# Patient Record
Sex: FEMALE | Race: WHITE | Employment: OTHER | URBAN - METROPOLITAN AREA
[De-identification: names, ages, dates, MRNs, and addresses within clinical notes are randomized per-mention and may not be internally consistent; named-entity substitution may affect disease eponyms.]

---

## 2017-04-25 ENCOUNTER — NURSING HOME VISIT (OUTPATIENT)
Dept: GERIATRICS | Facility: CLINIC | Age: 82
End: 2017-04-25
Payer: MEDICARE

## 2017-04-25 VITALS
RESPIRATION RATE: 16 BRPM | HEART RATE: 68 BPM | DIASTOLIC BLOOD PRESSURE: 94 MMHG | SYSTOLIC BLOOD PRESSURE: 170 MMHG | WEIGHT: 210 LBS | BODY MASS INDEX: 36.33 KG/M2 | OXYGEN SATURATION: 97 % | TEMPERATURE: 96.8 F

## 2017-04-25 DIAGNOSIS — J44.1 CHRONIC OBSTRUCTIVE PULMONARY DISEASE WITH ACUTE EXACERBATION (H): ICD-10-CM

## 2017-04-25 DIAGNOSIS — R41.3 MEMORY LOSS: ICD-10-CM

## 2017-04-25 DIAGNOSIS — R19.7 DIARRHEA, UNSPECIFIED TYPE: ICD-10-CM

## 2017-04-25 DIAGNOSIS — A41.9 SEPSIS, DUE TO UNSPECIFIED ORGANISM: Primary | ICD-10-CM

## 2017-04-25 DIAGNOSIS — I10 ESSENTIAL HYPERTENSION: ICD-10-CM

## 2017-04-25 PROCEDURE — 99306 1ST NF CARE HIGH MDM 50: CPT | Performed by: FAMILY MEDICINE

## 2017-04-25 PROCEDURE — 99207 ZZC CDG-CORRECTLY CODED, REVIEWED AND AGREE: CPT | Performed by: FAMILY MEDICINE

## 2017-04-25 RX ORDER — BUDESONIDE AND FORMOTEROL FUMARATE DIHYDRATE 80; 4.5 UG/1; UG/1
2 AEROSOL RESPIRATORY (INHALATION) 2 TIMES DAILY
COMMUNITY
End: 2017-08-22

## 2017-04-25 RX ORDER — TIOTROPIUM BROMIDE 18 UG/1
18 CAPSULE ORAL; RESPIRATORY (INHALATION) DAILY
COMMUNITY
End: 2018-05-30

## 2017-04-25 RX ORDER — NYSTATIN 100000 U/G
CREAM TOPICAL DAILY PRN
COMMUNITY
End: 2017-06-20

## 2017-04-25 RX ORDER — GUAIFENESIN 600 MG/1
600 TABLET, EXTENDED RELEASE ORAL 2 TIMES DAILY
COMMUNITY
End: 2017-06-20

## 2017-04-25 RX ORDER — ALBUTEROL SULFATE 0.83 MG/ML
1 SOLUTION RESPIRATORY (INHALATION) EVERY 6 HOURS PRN
COMMUNITY
End: 2017-05-02

## 2017-04-25 NOTE — MR AVS SNAPSHOT
"              After Visit Summary   2017    Jazmin Clifton    MRN: [id number]           Patient Information     Date Of Birth          1932        Visit Information        Provider Department      2017 10:30 AM Jessica Alvarez MD [facility]        Today's Diagnoses     Sepsis, due to unspecified organism (H)    -  1    Chronic obstructive pulmonary disease with acute exacerbation (H)        Essential hypertension        Diarrhea, unspecified type        Memory loss           Follow-ups after your visit        Who to contact     If you have questions or need follow up information about today's clinic visit or your schedule please contact [facility] directly at [phone].  Normal or non-critical lab and imaging results will be communicated to you by Antenovahart, letter or phone within 4 business days after the clinic has received the results. If you do not hear from us within 7 days, please contact the clinic through Antenovahart or phone. If you have a critical or abnormal lab result, we will notify you by phone as soon as possible.  Submit refill requests through GleeMaster or call your pharmacy and they will forward the refill request to us. Please allow 3 business days for your refill to be completed.          Additional Information About Your Visit        MyChart Information     GleeMaster lets you send messages to your doctor, view your test results, renew your prescriptions, schedule appointments and more. To sign up, go to [email or web address] . Click on \"Log in\" on the left side of the screen, which will take you to the Welcome page. Then click on \"Sign up Now\" on the right side of the page.     You will be asked to enter the access code listed below, as well as some personal information. Please follow the directions to create your username and password.     Your access code is: [email or web address]  Expires: 2017  1:35 PM     Your access code will  in 90 " days. If you need help or a new code, please call your [place] clinic or [phone].        Care EveryWhere ID     This is your Care EveryWhere ID. This could be used by other organizations to access your [place] medical records  [id number]        Your Vitals Were     Pulse Temperature Respirations Pulse Oximetry BMI (Body Mass Index)       68 96.8  F (36  C) 16 97% 36.33 kg/m2        Blood Pressure from Last 3 Encounters:   04/25/17 (!) 170/94   02/12/16 145/71   01/29/16 170/75    Weight from Last 3 Encounters:   04/25/17 210 lb (95.3 kg)   01/29/16 188 lb (85.3 kg)   05/14/12 198 lb 14.4 oz (90.2 kg)              Today, you had the following     No orders found for display         Today's Medication Changes          These changes are accurate as of: 4/25/17 11:59 PM.  If you have any questions, ask your nurse or doctor.               These medicines have changed or have updated prescriptions.        Dose/Directions    NAMENDA PO   This may have changed:  Another medication with the same name was removed. Continue taking this medication, and follow the directions you see here.        Dose:  5 mg   Take 5 mg by mouth daily   Refills:  0       TYLENOL PO   This may have changed:  Another medication with the same name was removed. Continue taking this medication, and follow the directions you see here.        Dose:  500 mg   Take 500 mg by mouth every 6 hours as needed for mild pain or fever   Refills:  0         Stop taking these medicines if you haven't already. Please contact your care team if you have questions.     ferrous sulfate 325 (65 FE) MG tablet   Commonly known as:  IRON           METHOCARBAMOL PO           OXYCODONE HCL PO           senna-docusate 8.6-50 MG per tablet   Commonly known as:  SENOKOT-S;PERICOLACE                    Primary Care Provider Office Phone # Fax #    Ginny Helton [phone]       [address] [address]         Thank you!     Thank you for choosing [facility]  for your care. Our goal is always to provide you with excellent care. Hearing back from our patients is one way we can continue to improve our services. Please take a few minutes to complete the written survey that you may receive in the mail after your visit with us. Thank you!             Your Updated Medication List - Protect others around you: Learn how to safely use, store and throw away your medicines at www.disposemymeds.org.          This list is accurate as of: 4/25/17 11:59 PM.  Always use your most recent med list.                   Brand Name Dispense Instructions for use    albuterol (2.5 MG/3ML) 0.083% neb solution      Take 1 vial by nebulization every 6 hours as needed for shortness of breath / dyspnea or wheezing       ASPIRIN PO      Take 81 mg by mouth daily       bisacodyl 10 MG Suppository    DULCOLAX     Place 10 mg rectally daily as needed for constipation       budesonide-formoterol 80-4.5 MCG/ACT Inhaler    SYMBICORT     Inhale 2 puffs into the lungs 2 times daily       calcium carb 1250 mg (500 mg [place])/vitamin D 200 units 500-200 MG-UNIT per tablet    OSCAL with D     Take 1 tablet by mouth daily       CELEXA PO      Take 10 mg by mouth daily       cyanocobalamin 1000 MCG tablet    vitamin  B-12     Take 1,000 mcg by mouth daily       DITROPAN XL PO      Take 5 mg by mouth 2 times daily       * guaiFENesin 100 MG/5ML Syrp    ROBITUSSIN     Take 10 mLs by mouth every 6 hours as needed for cough       * guaiFENesin 600 MG 12 hr tablet    MUCINEX     Take 600 mg by mouth 2 times daily       HYDROXYZINE HCL PO      Take 10 mg by mouth every 6 hours as needed for itching       IMDUR PO      Take 30 mg by mouth daily       isradipine 5 MG Tb24    DYNACIRC     Take 5 mg by mouth daily       LASIX PO      Take 20 mg by mouth daily       LISINOPRIL PO      Take 20 mg by mouth daily       magnesium citrate solution      Take 296  mLs by mouth once       magnesium hydroxide 400 MG/5ML suspension    MILK OF MAGNESIA     Take 30 mLs by mouth daily as needed for constipation or heartburn       MAGNESIUM OXIDE PO      Take 250 mg by mouth daily       NAMENDA PO      Take 5 mg by mouth daily       NEURONTIN PO      Take 300 mg by mouth daily       nystatin cream    MYCOSTATIN     Apply topically daily as needed for dry skin       * PREDNISONE PO      Take 40 mg by mouth daily       * PREDNISONE PO      Take 30 mg by mouth daily       * PREDNISONE PO   Start taking on:  4/29/2017      Take 20 mg by mouth daily       * PREDNISONE PO   Start taking on:  5/1/2017      Take 10 mg by mouth daily       * PREDNISONE PO   Start taking on:  5/3/2017      Take 5 mg by mouth daily       PRILOSEC PO      Take 20 mg by mouth every morning       tiotropium 18 MCG capsule    SPIRIVA     Inhale 18 mcg into the lungs daily       TYLENOL PO      Take 500 mg by mouth every 6 hours as needed for mild pain or fever       VITAMIN D (CHOLECALCIFEROL) PO      Take 5,000 Units by mouth daily       ZOCOR PO      Take 10 mg by mouth At Bedtime       ZOFRAN PO      Take 8 mg by mouth every 8 hours as needed for nausea or vomiting       * Notice:  This list has 7 medication(s) that are the same as other medications prescribed for you. Read the directions carefully, and ask your doctor or other care provider to review them with you.

## 2017-04-25 NOTE — PROGRESS NOTES
[facility]  PRIMARY CARE PROVIDER AND CLINIC:  Ginny Helton [facility] [address] / [place] [address]  Chief Complaint   Patient presents with     Hospital F/U     Nursing Home Acute       HPI:    Jazmin Clifton is a 84 year old  (12/30/1932),admitted to the [facility]  from Hospital  [facility].  Hospital stay 4/14/17 through 4/18/17.  Admitted to this facility for  rehab, medical management and nursing care. Current issues are:         Sepsis (H)  COPD (chronic obstructive pulmonary disease) (H)  Essential hypertension  Diarrhea     Blood Pressures-  4/21  138/79  4/23  167/72    This 84 y.o. female was admitted to [facility] of shortness of breath, cough and chest pain. CT was done in emergency room which showed no evidence of PE some segmental atelectasis with consolidation but otherwise no acute infiltrate. Patient was started on IV steroids, azithromycin and ceftriaxone, bronchodilators with good results. Patient has gradually improved and is not much short of breath compared to admission. She was also started on Spiriva during hospital stay which she's been tolerating well. Patient has been ablating in hallways without significant shortness of breath. She still does have a similar crackles but denies bringing up any significant sputum. Patient still complains of dry hacking cough. Overall she thinks she has improved close to her baseline. She was seen by PT and OT and is recommended for acute rehabilitation. Patient was also evaluated by speech pathologist while in hospital. She has completed 5 days of antibiotic therapy. She'll be discharged on prednisone taper over 2-3 weeks. We'll discharge her on Spiriva and Symbicort along with guaifenesin.          CODE STATUS/ADVANCE DIRECTIVES DISCUSSION:   CPR/Full code   Patient's living condition: lives with family, Son     ALLERGIES:Accupril; Ace inhibitors; Augmentin; Levofloxacin; Morphine; and Norvasc [amlodipine  besylate]  PAST MEDICAL HISTORY:  has a past medical history of ABDOMINAL PAIN GENERALIZED (3/15/2006); Abdominal pain, generalized (3/15/2006); Atherosclerosis of renal artery (H); BENIGN HYPERTENSION (5/1/2006); Benign neoplasm of scalp and skin of neck; Cerebral aneurysm, nonruptured; Depressive disorder, not elsewhere classified; Esophageal reflux; Female stress incontinence; 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  PAST SURGICAL HISTORY:  has a past surgical history that includes surgical history of -; surgical history of -; surgical history of - (1996); surgical history of -; surgical history of -; cholecystectomy, laporoscopic (1997); and hysterectomy, mirtha (1982).  FAMILY HISTORY: family history includes Asthma in her son; CANCER in her brother and mother; CEREBROVASCULAR DISEASE in her father; DIABETES in her son; Eye Disorder in her son; GASTROINTESTINAL DISEASE in her son; HEART DISEASE in her father. There is no history of C.A.D., Hypertension, Breast Cancer, Cancer - colorectal, or Prostate Cancer.  SOCIAL HISTORY:  reports that she has quit smoking. She does not have any smokeless tobacco history on file. She reports that she drinks alcohol. She reports that she does not use illicit drugs.    Post Discharge Medication Reconciliation Status: discharge medications reconciled and changed, per note/orders (see AVS).  Current Outpatient Prescriptions   Medication Sig Dispense Refill     albuterol (2.5 MG/3ML) 0.083% neb solution Take 1 vial by nebulization every 6 hours as needed for shortness of breath / dyspnea or wheezing       ASPIRIN PO Take 81 mg by mouth daily       PREDNISONE PO Take 40 mg by mouth daily       [START ON  4/27/2017] PREDNISONE PO Take 30 mg by mouth daily       [START ON 4/29/2017] PREDNISONE PO Take 20 mg by mouth daily       [START ON 5/1/2017] PREDNISONE PO Take 10 mg by mouth daily       [START ON 5/3/2017] PREDNISONE PO Take 5 mg by mouth daily       guaiFENesin (ROBITUSSIN) 100 MG/5ML SYRP Take 10 mLs by mouth every 6 hours as needed for cough       HYDROXYZINE HCL PO Take 10 mg by mouth every 6 hours as needed for itching       MAGNESIUM OXIDE PO Take 250 mg by mouth daily       guaiFENesin (MUCINEX) 600 MG 12 hr tablet Take 600 mg by mouth 2 times daily       Memantine HCl (NAMENDA PO) Take 5 mg by mouth daily       calcium carb 1250 mg, 500 mg [place],/vitamin D 200 units (OSCAL WITH D) 500-200 MG-UNIT per tablet Take 1 tablet by mouth daily       nystatin (MYCOSTATIN) cream Apply topically daily as needed for dry skin       tiotropium (SPIRIVA) 18 MCG capsule Inhale 18 mcg into the lungs daily       budesonide-formoterol (SYMBICORT) 80-4.5 MCG/ACT Inhaler Inhale 2 puffs into the lungs 2 times daily       Acetaminophen (TYLENOL PO) Take 500 mg by mouth every 6 hours as needed for mild pain or fever       Furosemide (LASIX PO) Take 20 mg by mouth daily       Citalopram Hydrobromide (CELEXA PO) Take 10 mg by mouth daily       VITAMIN D, CHOLECALCIFEROL, PO Take 5,000 Units by mouth daily       Oxybutynin Chloride (DITROPAN XL PO) Take 5 mg by mouth 2 times daily       bisacodyl (DULCOLAX) 10 MG suppository Place 10 mg rectally daily as needed for constipation       isradipine (DYNACIRC) 5 MG TB24 Take 5 mg by mouth daily       Isosorbide Mononitrate (IMDUR PO) Take 30 mg by mouth daily       LISINOPRIL PO Take 10 mg by mouth daily       magnesium citrate solution Take 296 mLs by mouth once       magnesium hydroxide (MILK OF MAGNESIA) 400 MG/5ML suspension Take 30 mLs by mouth daily as needed for constipation or heartburn       Gabapentin (NEURONTIN PO) Take 300 mg by mouth daily       Omeprazole (PRILOSEC PO)  Take 20 mg by mouth every morning       cyanocobalamin (VITAMIN  B-12) 1000 MCG tablet Take 1,000 mcg by mouth daily       Simvastatin (ZOCOR PO) Take 10 mg by mouth At Bedtime         ROS: c/o of nausea and diarrhea x 12 hrs. No fever. Blood pressure higher the last 2 days.   10 point ROS of systems including Constitutional, Eyes, Respiratory, Cardiovascular, Gastroenterology, Genitourinary, Integumentary, Muscularskeletal, Psychiatric were all negative except for pertinent positives noted in my HPI.    Exam:  BP (!) 170/94  Pulse 68  Temp 96.8  F (36  C)  Resp 16  Wt 210 lb (95.3 kg)  SpO2 97%  BMI 36.33 kg/m2  Gen: sitting in chair, alert, cooperative and in no respiratory  distress  HEENT: normocephalic; oropharynx clear  Card: RRR, S1, S2, no murmurs  Resp: lungs clear to auscultation bilaterally, wheezes   GI: abdomen soft, not-tender  MSK: normal muscle tone, no LE edema  Neuro: CX II-XII grossly in tact; ROM in all four extremities grossly in tact  Psych: alert and oriented x3; normal affect  Skin: warm, no suspicious rashes or lesions noted      Lab/Diagnostic data: all labs reviewed, none recent     ASSESSMENT/PLAN:  1. Sepsis (H)    2. COPD (chronic obstructive pulmonary disease) (H)    3. Essential hypertension    4. Diarrhea    5. Memory loss          Orders:  1. Zofran 8 mg po q 8 hr x 24 hr then q 6 hr prn Dx Nausea  2. C diff and stool cx Dx diarrhea  3. Increase lisinopril to 20 mg po qd (give total of 20 mg today) Dx HTN  4. Cont inue current meds for COPD exacerbation   Information reviewed:  Medications, vital signs, orders, nursing notes, problem list, hospital information. Total time spent with patient visit was 45 min including patient visit and review of past records. Greater than 50% of total time spent with counseling and coordinating care.    Electronically signed by:  Jessica Alvarez MD, MD

## 2017-05-02 ENCOUNTER — NURSING HOME VISIT (OUTPATIENT)
Dept: GERIATRICS | Facility: CLINIC | Age: 82
End: 2017-05-02
Payer: MEDICARE

## 2017-05-02 VITALS
HEART RATE: 71 BPM | BODY MASS INDEX: 34.6 KG/M2 | WEIGHT: 200 LBS | TEMPERATURE: 97.7 F | DIASTOLIC BLOOD PRESSURE: 73 MMHG | OXYGEN SATURATION: 97 % | RESPIRATION RATE: 20 BRPM | SYSTOLIC BLOOD PRESSURE: 144 MMHG

## 2017-05-02 DIAGNOSIS — I10 ESSENTIAL HYPERTENSION: ICD-10-CM

## 2017-05-02 DIAGNOSIS — A09: ICD-10-CM

## 2017-05-02 DIAGNOSIS — F02.80 ALZHEIMER'S DEMENTIA WITHOUT BEHAVIORAL DISTURBANCE, UNSPECIFIED TIMING OF DEMENTIA ONSET: ICD-10-CM

## 2017-05-02 DIAGNOSIS — G30.9 ALZHEIMER'S DEMENTIA WITHOUT BEHAVIORAL DISTURBANCE, UNSPECIFIED TIMING OF DEMENTIA ONSET: ICD-10-CM

## 2017-05-02 DIAGNOSIS — J44.1 CHRONIC OBSTRUCTIVE PULMONARY DISEASE WITH ACUTE EXACERBATION (H): Primary | ICD-10-CM

## 2017-05-02 DIAGNOSIS — A41.9 SEPSIS, DUE TO UNSPECIFIED ORGANISM: ICD-10-CM

## 2017-05-02 PROCEDURE — 99316 NF DSCHRG MGMT 30 MIN+: CPT | Performed by: FAMILY MEDICINE

## 2017-05-02 PROCEDURE — 99207 ZZC CDG-CORRECTLY CODED, REVIEWED AND AGREE: CPT | Performed by: FAMILY MEDICINE

## 2017-05-02 NOTE — MR AVS SNAPSHOT
"              After Visit Summary   5/2/2017    Jazmin Clifton    MRN: [id number]           Patient Information     Date Of Birth          12/30/1932        Visit Information        Provider Department      5/2/2017 10:00 AM Jessica Alvarez MD [facility]        Today's Diagnoses     Chronic obstructive pulmonary disease with acute exacerbation (H)    -  1    Sepsis, due to unspecified organism (H)        Essential hypertension        Alzheimer's dementia without behavioral disturbance, unspecified timing of dementia onset        Gastric flu           Follow-ups after your visit        Who to contact     If you have questions or need follow up information about today's clinic visit or your schedule please contact [facility] directly at [phone].  Normal or non-critical lab and imaging results will be communicated to you by SOLOMO Technologyhart, letter or phone within 4 business days after the clinic has received the results. If you do not hear from us within 7 days, please contact the clinic through SOLOMO Technologyhart or phone. If you have a critical or abnormal lab result, we will notify you by phone as soon as possible.  Submit refill requests through [employer] or call your pharmacy and they will forward the refill request to us. Please allow 3 business days for your refill to be completed.          Additional Information About Your Visit        MyChart Information     [employer] lets you send messages to your doctor, view your test results, renew your prescriptions, schedule appointments and more. To sign up, go to [email or web address] . Click on \"Log in\" on the left side of the screen, which will take you to the Welcome page. Then click on \"Sign up Now\" on the right side of the page.     You will be asked to enter the access code listed below, as well as some personal information. Please follow the directions to create your username and password.     Your access code is: [email or web address]  Expires: " 2017  1:35 PM     Your access code will  in 90 days. If you need help or a new code, please call your [place] clinic or [phone].        Care EveryWhere ID     This is your Care EveryWhere ID. This could be used by other organizations to access your [place] medical records  [id number]        Your Vitals Were     Pulse Temperature Respirations Pulse Oximetry BMI (Body Mass Index)       71 97.7  F (36.5  C) 20 97% 34.6 kg/m2        Blood Pressure from Last 3 Encounters:   17 144/73   17 (!) 170/94   16 145/71    Weight from Last 3 Encounters:   17 200 lb (90.7 kg)   17 210 lb (95.3 kg)   16 188 lb (85.3 kg)              Today, you had the following     No orders found for display         Today's Medication Changes          These changes are accurate as of: 17 11:59 PM.  If you have any questions, ask your nurse or doctor.               Stop taking these medicines if you haven't already. Please contact your care team if you have questions.     albuterol (2.5 MG/3ML) 0.083% neb solution           NAMENDA PO           JEANNIE PO                    Primary Care Provider Office Phone # Fax #    Ginny Helton [phone]       [address] [address]        Thank you!     Thank you for choosing [facility]  for your care. Our goal is always to provide you with excellent care. Hearing back from our patients is one way we can continue to improve our services. Please take a few minutes to complete the written survey that you may receive in the mail after your visit with us. Thank you!             Your Updated Medication List - Protect others around you: Learn how to safely use, store and throw away your medicines at www.disposemymeds.org.          This list is accurate as of: 17 11:59 PM.  Always use your most recent med list.                   Brand Name Dispense Instructions for use     ASPIRIN PO      Take 81 mg by mouth daily       bisacodyl 10 MG Suppository    DULCOLAX     Place 10 mg rectally daily as needed for constipation       budesonide-formoterol 80-4.5 MCG/ACT Inhaler    SYMBICORT     Inhale 2 puffs into the lungs 2 times daily       calcium carb 1250 mg (500 mg [place])/vitamin D 200 units 500-200 MG-UNIT per tablet    OSCAL with D     Take 1 tablet by mouth daily       CELEXA PO      Take 10 mg by mouth daily       cyanocobalamin 1000 MCG tablet    vitamin  B-12     Take 1,000 mcg by mouth daily       DITROPAN XL PO      Take 5 mg by mouth 2 times daily       * guaiFENesin 100 MG/5ML Syrp    ROBITUSSIN     Take 10 mLs by mouth every 6 hours as needed for cough       * guaiFENesin 600 MG 12 hr tablet    MUCINEX     Take 600 mg by mouth 2 times daily       HYDROXYZINE HCL PO      Take 10 mg by mouth every 6 hours as needed for itching       IMDUR PO      Take 30 mg by mouth daily       isradipine 5 MG Tb24    DYNACIRC     Take 5 mg by mouth daily       LASIX PO      Take 20 mg by mouth daily       LISINOPRIL PO      Take 20 mg by mouth daily       magnesium citrate solution      Take 296 mLs by mouth once       magnesium hydroxide 400 MG/5ML suspension    MILK OF MAGNESIA     Take 30 mLs by mouth daily as needed for constipation or heartburn       MAGNESIUM OXIDE PO      Take 250 mg by mouth daily       NAMZARIC 7 & 14 & 21 &28 -10 MG C4pk   Generic drug:  Memantine HCl-Donepezil HCl      Take 1 tablet by mouth daily       NEURONTIN PO      Take 300 mg by mouth daily       nystatin cream    MYCOSTATIN     Apply topically daily as needed for dry skin       * PREDNISONE PO      Take 10 mg by mouth daily       * PREDNISONE PO      Take 5 mg by mouth daily       PRILOSEC PO      Take 20 mg by mouth every morning       tiotropium 18 MCG capsule    SPIRIVA     Inhale 18 mcg into the lungs daily       TYLENOL PO      Take 500 mg by mouth every 6 hours as needed for mild pain or fever        VITAMIN D (CHOLECALCIFEROL) PO      Take 5,000 Units by mouth daily       ZOCOR PO      Take 10 mg by mouth At Bedtime       * Notice:  This list has 4 medication(s) that are the same as other medications prescribed for you. Read the directions carefully, and ask your doctor or other care provider to review them with you.

## 2017-05-02 NOTE — PROGRESS NOTES
[place] [facility] DISCHARGE SUMMARY    PATIENT'S NAME: Jazmin Clifton  YOB: 1932  MEDICAL RECORD NUMBER:  [id number]    PRIMARY CARE PROVIDER AND CLINIC RESPONSIBLE AFTER TRANSFER: Ginny Helton [facility] [address] [place] [address]     CODE STATUS/ADVANCE DIRECTIVES DISCUSSION:   CPR/Full code        Allergies   Allergen Reactions     Accupril      Ace Inhibitors      Accupril     Augmentin      Levofloxacin      Morphine      Norvasc [Amlodipine Besylate]      Leg swelling         TRANSFERRING PROVIDERS: Jessica Alvarez MD,CMD  DATE OF SNF ADMISSION:  April / 18 / 2017  DATE OF SNF (anticipated) DISCHARGE: May / 02 / 2017  DISCHARGE DISPOSITION: [facility] Provider   Nursing Facility: [facility] stay 4/14/17 to 4/18/17.     Condition on Discharge:  Improving.  Function:  indep  Cognitive Scores: CPT 5.0    Equipment: no aids    DISCHARGE DIAGNOSIS:   1. Chronic obstructive pulmonary disease with acute exacerbation (H)    2. Sepsis, due to unspecified organism (H)    3. Essential hypertension    4. Alzheimer's dementia without behavioral disturbance, unspecified timing of dementia onset    5. Gastric flu        HPI Nursing Facility Course:  Patient was admitted to facility after hospitalization for Sepsis and COPD Exacerbation. She initally had the GI flu which primitivo ehr back a few days but resolved. Has dx of AD. Wanted to get started on treatment. Had been on starting dose of Namenda Patient participated in PT/OT.  Patient will discharge home with in home PT/RN through [facility].      PAST MEDICAL HISTORY:  has a past medical history of ABDOMINAL PAIN GENERALIZED (3/15/2006); Abdominal pain, generalized (3/15/2006); Atherosclerosis of renal artery (H); BENIGN HYPERTENSION (5/1/2006); Benign neoplasm of scalp and skin of neck; Cerebral aneurysm, nonruptured; Depressive disorder, not elsewhere classified; Esophageal reflux;  Female stress incontinence; 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    DISCHARGE MEDICATIONS:  Current Outpatient Prescriptions   Medication Sig Dispense Refill     albuterol (2.5 MG/3ML) 0.083% neb solution Take 1 vial by nebulization every 6 hours as needed for shortness of breath / dyspnea or wheezing       ASPIRIN PO Take 81 mg by mouth daily       PREDNISONE PO Take 10 mg by mouth daily       [START ON 5/3/2017] PREDNISONE PO Take 5 mg by mouth daily       guaiFENesin (ROBITUSSIN) 100 MG/5ML SYRP Take 10 mLs by mouth every 6 hours as needed for cough       HYDROXYZINE HCL PO Take 10 mg by mouth every 6 hours as needed for itching       MAGNESIUM OXIDE PO Take 250 mg by mouth daily       guaiFENesin (MUCINEX) 600 MG 12 hr tablet Take 600 mg by mouth 2 times daily       Memantine HCl (NAMENDA PO) Take 5 mg by mouth daily       calcium carb 1250 mg, 500 mg [place],/vitamin D 200 units (OSCAL WITH D) 500-200 MG-UNIT per tablet Take 1 tablet by mouth daily       nystatin (MYCOSTATIN) cream Apply topically daily as needed for dry skin       tiotropium (SPIRIVA) 18 MCG capsule Inhale 18 mcg into the lungs daily       budesonide-formoterol (SYMBICORT) 80-4.5 MCG/ACT Inhaler Inhale 2 puffs into the lungs 2 times daily       Acetaminophen (TYLENOL PO) Take 500 mg by mouth every 6 hours as needed for mild pain or fever       Ondansetron HCl (ZOFRAN PO) Take 8 mg by mouth every 8 hours as needed for nausea or vomiting       Furosemide (LASIX PO) Take 20 mg by mouth daily       Citalopram Hydrobromide (CELEXA PO) Take 10 mg by mouth daily       VITAMIN D, CHOLECALCIFEROL, PO Take 5,000 Units by mouth daily       Oxybutynin Chloride (DITROPAN XL PO)  Take 5 mg by mouth 2 times daily       bisacodyl (DULCOLAX) 10 MG suppository Place 10 mg rectally daily as needed for constipation       isradipine (DYNACIRC) 5 MG TB24 Take 5 mg by mouth daily       Isosorbide Mononitrate (IMDUR PO) Take 30 mg by mouth daily       LISINOPRIL PO Take 20 mg by mouth daily        magnesium citrate solution Take 296 mLs by mouth once       magnesium hydroxide (MILK OF MAGNESIA) 400 MG/5ML suspension Take 30 mLs by mouth daily as needed for constipation or heartburn       Gabapentin (NEURONTIN PO) Take 300 mg by mouth daily       Omeprazole (PRILOSEC PO) Take 20 mg by mouth every morning       cyanocobalamin (VITAMIN  B-12) 1000 MCG tablet Take 1,000 mcg by mouth daily       Simvastatin (ZOCOR PO) Take 10 mg by mouth At Bedtime         MEDICATION CHANGES/RATIONALE:   none  Controlled medications sent with patient: not applicable/none     ROS:    10 point ROS of systems including Constitutional, Eyes, Respiratory, Cardiovascular, Gastroenterology, Genitourinary, Integumentary, Muscularskeletal, Psychiatric were all negative except for pertinent positives noted in my HPI.    Physical Exam:   Vitals: /73  Pulse 71  Temp 97.7  F (36.5  C)  Resp 20  Wt 200 lb (90.7 kg)  SpO2 97%  BMI 34.6 kg/m2  BMI= Body mass index is 34.6 kg/(m^2).    Gen: sitting in chair, alert, cooperative and in no acute distress. Wearing makeup  HEENT: normocephalic; oropharynx clear  Card: RRR, S1, S2, no murmurs  Resp: lungs clear to auscultation bilaterally, no wheezes or crackles  GI: abdomen soft, not-tender  MSK: normal muscle tone, no LE edema  Neuro: CX II-XII grossly in tact; ROM in all four extremities grossly in tact  Psych: alert and oriented x3; normal affect  Skin: warm, no suspicious rashes or lesions noted      DISCHARGE PLAN:  Physical Therapy, Registered Nurse and From:  [facility]  Patient instructed to follow-up with:  PCP in 7 days      [facility] scheduled appointments:  No  future appointments.    MTM referral needed and placed by this provider: No    Pending labs: none  SNF labs None  Discharge Treatments:  1. DC Zofran and prn albuterol nebs  2. F/U with PMD in 1-2 weeks  3. DC Namenda- patient only on 5 mg which stated by her spine surgeon.  She would like to try new combo pill Namzaric.  4. Start Namzaric starter pack 1 tab po QD Dx Moderate AD, f/u with PMD in 2 weeks to re check.        TOTAL DISCHARGE TIME:   Greater than 30 minutes  Electronically signed by:  Jessica Alvarez MD, MD        Documentation of Face to Face and Certification for Home Health Services    I certify that patient: Jazmin Clifton is under my care and that I, or a nurse practitioner or physician's assistant working with me, had a face-to-face encounter that meets the physician face-to-face encounter requirements with this patient on: 5/2/2017.    This encounter with the patient was in whole, or in part, for the following medical condition, which is the primary reason for home health care: COPD Exacerbation, Sepsis, HTN and Alzheimer's Disease.    I certify that, based on my findings, the following services are medically necessary home health services: Nursing and Physical Therapy.    My clinical findings support the need for the above services because: Nurse is needed: To provide caregiver training to assist with: COPD Exacerbation, Sepsis, HTN and Alzheimer's Disease... and Physical Therapy Services are needed to assess and treat the following functional impairments: COPD Exacerbation, Sepsis, HTN and Alzheimer's Disease..    Further, I certify that my clinical findings support that this patient is homebound (i.e. absences from home require considerable and taxing effort and are for medical reasons or [employer] services or infrequently or of short duration when for other reasons) because: Requires assistance of another person or specialized equipment to access medical services because patient: Is  unable to walk greater than 200 feet without rest...    Based on the above findings. I certify that this patient is confined to the home and needs intermittent skilled nursing care, physical therapy and/or speech therapy.  The patient is under my care, and I have initiated the establishment of the plan of care.  This patient will be followed by a physician who will periodically review the plan of care.  Physician/Provider to provide follow up care: Ginny Helton    Attending hospital physician (the Medicare certified [facility] provider): Jessica Alvarez MD  Physician Signature: See electronic signature associated with these discharge orders.  Date: 5/2/2017

## 2017-06-11 ENCOUNTER — TELEPHONE (OUTPATIENT)
Dept: GERIATRICS | Facility: CLINIC | Age: 82
End: 2017-06-11

## 2017-06-11 NOTE — TELEPHONE ENCOUNTER
Patient with Nystatin ordered BID to breast folds - patient asks it be switched from cream to POWDER.  OK with above.     Electronically signed by MARY Hernandez GNP

## 2017-06-12 ENCOUNTER — NURSING HOME VISIT (OUTPATIENT)
Dept: GERIATRICS | Facility: CLINIC | Age: 82
End: 2017-06-12
Payer: MEDICARE

## 2017-06-12 VITALS
SYSTOLIC BLOOD PRESSURE: 142 MMHG | OXYGEN SATURATION: 92 % | WEIGHT: 191 LBS | TEMPERATURE: 97 F | RESPIRATION RATE: 18 BRPM | BODY MASS INDEX: 33.04 KG/M2 | HEART RATE: 70 BPM | DIASTOLIC BLOOD PRESSURE: 77 MMHG

## 2017-06-12 DIAGNOSIS — M96.1 FAILED BACK SYNDROME OF LUMBAR SPINE: ICD-10-CM

## 2017-06-12 DIAGNOSIS — J44.9 CHRONIC OBSTRUCTIVE PULMONARY DISEASE, UNSPECIFIED COPD TYPE (H): ICD-10-CM

## 2017-06-12 DIAGNOSIS — M62.81 GENERALIZED MUSCLE WEAKNESS: Primary | ICD-10-CM

## 2017-06-12 DIAGNOSIS — F02.80 ALZHEIMER'S DEMENTIA WITHOUT BEHAVIORAL DISTURBANCE, UNSPECIFIED TIMING OF DEMENTIA ONSET: ICD-10-CM

## 2017-06-12 DIAGNOSIS — L89.90 PRESSURE INJURY OF SKIN: ICD-10-CM

## 2017-06-12 DIAGNOSIS — G30.9 ALZHEIMER'S DEMENTIA WITHOUT BEHAVIORAL DISTURBANCE, UNSPECIFIED TIMING OF DEMENTIA ONSET: ICD-10-CM

## 2017-06-12 DIAGNOSIS — N39.0 URINARY TRACT INFECTION, SITE UNSPECIFIED: ICD-10-CM

## 2017-06-12 DIAGNOSIS — Z86.59 HISTORY OF DEPRESSION: ICD-10-CM

## 2017-06-12 DIAGNOSIS — Z71.89 ADVANCED DIRECTIVES, COUNSELING/DISCUSSION: ICD-10-CM

## 2017-06-12 DIAGNOSIS — I95.2 HYPOTENSION DUE TO DRUGS: ICD-10-CM

## 2017-06-12 PROCEDURE — 99207 ZZC CDG-CORRECTLY CODED, REVIEWED AND AGREE: CPT | Performed by: FAMILY MEDICINE

## 2017-06-12 PROCEDURE — 99306 1ST NF CARE HIGH MDM 50: CPT | Performed by: FAMILY MEDICINE

## 2017-06-12 PROCEDURE — 99497 ADVNCD CARE PLAN 30 MIN: CPT | Performed by: FAMILY MEDICINE

## 2017-06-12 NOTE — PROGRESS NOTES
[place] [facility]  PRIMARY CARE PROVIDER AND CLINIC:  Ginny Helton [facility] [address] / [address] [address]  Chief Complaint   Patient presents with     Hospital F/U       HPI:    Jazmin Clifton is a 84 year old  (12/30/1932),[facility].   Admitted to this facility for  rehab, medical management and nursing care, Pt and Epic EHR.    .  Current issues are:      Generalized muscle weakness  Urinary tract infection  - Had Sepsis in April 2/2 to UTI and ACOPDE, admitted to this facility for rehab from April 18 until may 2nd, dc home in a good condition. Recently acquired another UTI admitted to [facility] in [place] for one week, dced home. Continued to feel weak, went to PCP found to have hypotension, and generalized weakness, transferred directly to this facility by PCP.   - Denies urgency, frequency, or burning urination.      Hypotension due to drugs  - Found to have low BP a the PCP office, Imdur reduced.   - Denies fainting.     Chronic obstructive pulmonary disease, unspecified COPD type (H)  - Reportedly had hypoxia at PCP office. Reports chronic dyspnea with exertion.     Failed back syndrome of lumbar spine  - Reports 5 back surgeries in the past.   - Reports aching pain localized to lower back, constant, slightly worse now, better with oxycodone.     Skin rash over left 2nd toe:  - reports it started 3 days ago, prior to admission to this facility. No pain or itching.     History of depression  - Denies SI/HI, feeling guilty, low energy, lack of concentration, lack of interest or agitation. Reports sleep and appetite are fine.       Alzheimer's dementia without behavioral disturbance, unspecified timing of dementia onset  - RN reports no concern.       CODE STATUS/ADVANCE DIRECTIVES DISCUSSION:   CPR/Full code   Patient's living condition: lives alone    ALLERGIES:Accupril; Ace inhibitors; Augmentin; Levofloxacin; Morphine; and  Norvasc [amlodipine besylate]  PAST MEDICAL HISTORY:  has a past medical history of ABDOMINAL PAIN GENERALIZED (3/15/2006); Abdominal pain, generalized (3/15/2006); Atherosclerosis of renal artery (H); BENIGN HYPERTENSION (5/1/2006); Benign neoplasm of scalp and skin of neck; Cerebral aneurysm, nonruptured; Depressive disorder, not elsewhere classified; Esophageal reflux; Female stress incontinence; 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  PAST SURGICAL HISTORY:  has a past surgical history that includes surgical history of -; surgical history of -; surgical history of - (1996); surgical history of -; surgical history of -; cholecystectomy, laporoscopic (1997); and hysterectomy, mirtha (1982).  FAMILY HISTORY: family history includes Asthma in her son; CANCER in her brother and mother; CEREBROVASCULAR DISEASE in her father; DIABETES in her son; Eye Disorder in her son; GASTROINTESTINAL DISEASE in her son; HEART DISEASE in her father. There is no history of C.A.D., Hypertension, Breast Cancer, Cancer - colorectal, or Prostate Cancer.  SOCIAL HISTORY:  reports that she has quit smoking. She does not have any smokeless tobacco history on file. She reports that she drinks alcohol. She reports that she does not use illicit drugs.    Post Discharge Medication Reconciliation Status: discharge medications reconciled and changed, per note/orders (see AVS).  Current Outpatient Prescriptions   Medication Sig Dispense Refill     Memantine HCl-Donepezil HCl (NAMZARIC) 7 & 14 & 21 &28 -10 MG C4PK Take 1 tablet by mouth daily       ASPIRIN PO Take 81 mg by mouth daily       guaiFENesin (ROBITUSSIN) 100 MG/5ML SYRP Take 10 mLs by mouth every 6 hours as  needed for cough       HYDROXYZINE HCL PO Take 10 mg by mouth every 6 hours as needed for itching       MAGNESIUM OXIDE PO Take 250 mg by mouth daily       guaiFENesin (MUCINEX) 600 MG 12 hr tablet Take 600 mg by mouth 2 times daily       calcium carb 1250 mg, 500 mg [place],/vitamin D 200 units (OSCAL WITH D) 500-200 MG-UNIT per tablet Take 1 tablet by mouth daily       nystatin (MYCOSTATIN) cream Apply topically daily as needed for dry skin       tiotropium (SPIRIVA) 18 MCG capsule Inhale 18 mcg into the lungs daily       budesonide-formoterol (SYMBICORT) 80-4.5 MCG/ACT Inhaler Inhale 2 puffs into the lungs 2 times daily       Acetaminophen (TYLENOL PO) Take 500 mg by mouth every 6 hours as needed for mild pain or fever       Furosemide (LASIX PO) Take 20 mg by mouth daily       Citalopram Hydrobromide (CELEXA PO) Take 10 mg by mouth daily       VITAMIN D, CHOLECALCIFEROL, PO Take 5,000 Units by mouth daily       Oxybutynin Chloride (DITROPAN XL PO) Take 5 mg by mouth 2 times daily       bisacodyl (DULCOLAX) 10 MG suppository Place 10 mg rectally daily as needed for constipation       isradipine (DYNACIRC) 5 MG TB24 Take 5 mg by mouth daily       Isosorbide Mononitrate (IMDUR PO) Take 30 mg by mouth daily       LISINOPRIL PO Take 20 mg by mouth daily        magnesium citrate solution Take 296 mLs by mouth once       magnesium hydroxide (MILK OF MAGNESIA) 400 MG/5ML suspension Take 30 mLs by mouth daily as needed for constipation or heartburn       Gabapentin (NEURONTIN PO) Take 300 mg by mouth daily       Omeprazole (PRILOSEC PO) Take 20 mg by mouth every morning       cyanocobalamin (VITAMIN  B-12) 1000 MCG tablet Take 1,000 mcg by mouth daily       Simvastatin (ZOCOR PO) Take 10 mg by mouth At Bedtime         ROS:  10 point ROS of systems including Constitutional, Eyes, Respiratory, Cardiovascular, Gastroenterology, Genitourinary, Integumentary, Muscularskeletal, Psychiatric were all negative except for pertinent  positives noted in my HPI.    Exam:  /77  Pulse 70  Temp 97  F (36.1  C)  Resp 18  Wt 191 lb (86.6 kg)  SpO2 92%  BMI 33.04 kg/m2  GENERAL APPEARANCE:  Alert, in no distress  ENT:  Mouth and posterior oropharynx normal, moist mucous membranes  EYES:  EOM, conjunctivae, lids, pupils and irises normal, wears glasses  NECK:  No adenopathy,masses or thyromegaly  RESP:  respiratory effort and palpation of chest normal, lungs clear to auscultation , no respiratory distress  CV:  Palpation and auscultation of heart done , regular rate and rhythm, no murmur, rub, or gallop, peripheral edema 1+ in pedal- pitting b/l.   ABDOMEN:  normal bowel sounds, soft, nontender, no hepatosplenomegaly or other masses  M/S:   Gait and station abnormal Uses a walker. proximal muscle atrophy.   SKIN:  Non blanchable bluish-reddish spot over dorsal surface of left 2nd toe.   NEURO:   Cranial nerves 2-12 are normal tested and grossly at patient's baseline, no purposeful movement in upper and lower extremities  PSYCH:  AAOx3. Mood and affect appropriate.     Lab/Diagnostic data: all outside labs reviewed     ASSESSMENT/PLAN:  Generalized muscle weakness  - Started on OT/PT, continue. May need home care for nursing, OT/PT.     Hypotension due to drugs  - normotensive at this facility.   - Imdur reduced to 30 mg by PCP at the clinic prior to admission. No CP.   - On isradipine  5 mg, Lasix 20 mg bid and lisinopril 20 mg.   - controlled, continue.  - Keep SBP> 130 mmHg and DBP > 65 mmHg (levels below these increase mortality as shown by standard studies and observations).       Chronic obstructive pulmonary disease, unspecified COPD type (H)  - PCP on admission recommends to keep SpO2 > 94%. In COPD it is recommended to keep SpO2% b/w 88-92 to keep central breathing center stimulated.  Will change the recommendation in the chart.   - Adjust O2 based on the above.   - Has chronic LACEY. Possible 2/2 to cardio-pulmonary illnesses. May  benefit from PFT and or Echo on outpatient basis if have not been done w/i 2 years, 6 months respectively. PCP to follow on these.   - On Spiriva ,Symbicort and Duoneb prn. Stable. Continue    Failed back syndrome of lumbar spine  - Has an appointment with NS. No radiculopathy, hence unlikely will benefit from steroid injection.   - Not a surgical candidate given number of surgeries.   - Need a referral to a pain interventionalist for possible rhizotomies, spinal cord simulator, etc...   - On Neurontin for pain.   - Follow on the NS's recommendations.     Pressure injury of skin  - considered DTI given no breech of the skin but skin discoloration.   - over dorsal surface of left 2nd toe. Possible from ill-fitted shoe. No trauma reported. May need a better shoe.   - acquired prior to admission to this facility.   - Apply Mepilex. Monitor.     Urinary tract infection, site unspecified  - Completed ABX, resolved.     History of depression  - on Celexa 20 mg, increased from 10 mg during hospitalization in April.  stable.   - PCP to attempt GDR down the road when feasible.     Advanced directives, counseling/discussion   - Discussed with the patient, POLST filled, signed and filed. Full code  - Questions answered in detail, verbalized understanding   - More than 16 minutes spent with the patient face-to-face in addition to time spent on the POC.       Alzheimer's dementia without behavioral disturbance, unspecified timing of dementia onset  - On Namenda/donepezil, continue. Monitor for ADEs.      # The above assessment and plan was discussed with the patient in detail, and pt is in agreement; patient  asked questions which were answered in detail, and patient verbalized understanding. .    Orders:  - See above, otherwise, continue the rest of the current POC.   - Consider Dc benadryl if does not use.       Information reviewed:  Medications, vital signs, orders, nursing notes, problem list, hospital information. Total  time spent with patient visit was 45 min including patient visit and review of past records. Greater than 50% of total time spent with counseling and coordinating care.    Electronically signed by:  Toño Shafer MD

## 2017-06-20 ENCOUNTER — NURSING HOME VISIT (OUTPATIENT)
Dept: GERIATRICS | Facility: CLINIC | Age: 82
End: 2017-06-20
Payer: COMMERCIAL

## 2017-06-20 VITALS
TEMPERATURE: 98 F | OXYGEN SATURATION: 93 % | HEART RATE: 70 BPM | SYSTOLIC BLOOD PRESSURE: 137 MMHG | BODY MASS INDEX: 33.56 KG/M2 | RESPIRATION RATE: 20 BRPM | DIASTOLIC BLOOD PRESSURE: 68 MMHG | WEIGHT: 194 LBS

## 2017-06-20 DIAGNOSIS — N39.0 URINARY TRACT INFECTION WITHOUT HEMATURIA, SITE UNSPECIFIED: ICD-10-CM

## 2017-06-20 DIAGNOSIS — R32 URINARY INCONTINENCE, UNSPECIFIED TYPE: ICD-10-CM

## 2017-06-20 DIAGNOSIS — J98.4 RESTRICTIVE LUNG DISEASE: ICD-10-CM

## 2017-06-20 DIAGNOSIS — Z98.890 S/P LAMINECTOMY: ICD-10-CM

## 2017-06-20 DIAGNOSIS — I10 ESSENTIAL HYPERTENSION: Primary | ICD-10-CM

## 2017-06-20 PROCEDURE — 99310 SBSQ NF CARE HIGH MDM 45: CPT | Performed by: NURSE PRACTITIONER

## 2017-06-20 RX ORDER — IPRATROPIUM BROMIDE AND ALBUTEROL SULFATE 2.5; .5 MG/3ML; MG/3ML
1 SOLUTION RESPIRATORY (INHALATION) EVERY 6 HOURS PRN
COMMUNITY
End: 2017-07-25

## 2017-06-20 RX ORDER — NYSTATIN 100000 [USP'U]/G
POWDER TOPICAL 2 TIMES DAILY PRN
COMMUNITY
End: 2019-04-29

## 2017-06-20 RX ORDER — POTASSIUM CHLORIDE 1.5 G/1.58G
10 POWDER, FOR SOLUTION ORAL DAILY
Status: ON HOLD | COMMUNITY
Start: 2017-07-05 | End: 2017-08-31

## 2017-06-20 NOTE — PROGRESS NOTES
[facility]    Chief Complaint   Patient presents with     Nursing Home Acute       HPI:    Jazmin Clifton is a 84 year old  (12/30/1932), who is being seen today for an episodic care visit at [facility] .  HPI information obtained from: facility chart records and facility staff.Today's concern is:  Essential hypertension  Concerns with increased BP. On imdur, lasxi, isradipine, lisinopril. BP as listed below.     Restrictive lung disease  Chronic, on duoneb prn, spiriva, symbicort. Does have complaints of dyspnea with exertion    S/P laminectomy  Complaints of chronic back pain, now increased. She has MRI and appointment with spine center this week in follow up for pain control.     Urinary incontinence, unspecified type  Chronic, is followed by Dr. Stroud, urology at [place]. Chronic.     Urinary tract infection without hematuria, site unspecified  Recent UTI, finishing antibiotics today. She has no complaints of pain, dysuria.         BP readings-  5/17  - 187/74  5/18 -  158/60, 158/60, 177/78  5/19 - 180/97, 164/78, 164/78      ALLERGIES: Accupril; Ace inhibitors; Augmentin; Levofloxacin; Morphine; and Norvasc [amlodipine besylate]  Past Medical, Surgical, Family and Social History reviewed and updated in [facility].    Current Outpatient Prescriptions   Medication Sig Dispense Refill     LORazepam (ATIVAN PO) Take 0.5 mg by mouth as needed for anxiety       Docusate Sodium (COLACE PO) Take 100 mg by mouth daily       ipratropium - albuterol 0.5 mg/2.5 mg/3 mL (DUONEB) 0.5-2.5 (3) MG/3ML neb solution Take 1 vial by nebulization every 6 hours as needed for shortness of breath / dyspnea or wheezing       Memantine HCl (NAMENDA PO) Take 5 mg by mouth daily       nystatin (MYCOSTATIN) 646803 UNIT/GM POWD Apply topically 2 times daily       OXYCODONE HCL PO Take 5 mg by mouth every 6 hours as needed       potassium chloride (KLOR-CON) 20 MEQ Packet Take 20 mEq by mouth daily        ASPIRIN PO Take 81 mg by mouth daily       MAGNESIUM OXIDE PO Take 250 mg by mouth daily       calcium carb 1250 mg, 500 mg [place],/vitamin D 200 units (OSCAL WITH D) 500-200 MG-UNIT per tablet Take 1 tablet by mouth daily       tiotropium (SPIRIVA) 18 MCG capsule Inhale 18 mcg into the lungs daily       budesonide-formoterol (SYMBICORT) 80-4.5 MCG/ACT Inhaler Inhale 2 puffs into the lungs 2 times daily       Acetaminophen (TYLENOL PO) Take 500 mg by mouth every 6 hours as needed for mild pain or fever       Furosemide (LASIX PO) Take 40 mg by mouth 2 times daily        Citalopram Hydrobromide (CELEXA PO) Take 10 mg by mouth daily       bisacodyl (DULCOLAX) 10 MG suppository Place 10 mg rectally daily as needed for constipation       isradipine (DYNACIRC) 5 MG TB24 Take 5 mg by mouth daily       Isosorbide Mononitrate (IMDUR PO) Take 30 mg by mouth daily       LISINOPRIL PO Take 20 mg by mouth daily        magnesium citrate solution Take 296 mLs by mouth once       magnesium hydroxide (MILK OF MAGNESIA) 400 MG/5ML suspension Take 30 mLs by mouth daily as needed for constipation or heartburn       Gabapentin (NEURONTIN PO) Take 300 mg by mouth daily And 1 tab po qd prn       Omeprazole (PRILOSEC PO) Take 20 mg by mouth every morning       cyanocobalamin (VITAMIN  B-12) 1000 MCG tablet Take 1,000 mcg by mouth daily       Simvastatin (ZOCOR PO) Take 10 mg by mouth At Bedtime       Medications reviewed:  Medications reconciled to facility chart and changes were made to reflect current medications as identified as above med list. Below are the changes that were made:   Medications stopped since last EPIC medication reconciliation:       Medications started since last [facility] medication reconciliation:        REVIEW OF SYSTEMS:  4 point ROS including Respiratory, CV, GI and , other than that noted in the HPI,  is negative    Physical Exam:  /68  Pulse 70  Temp 98  F (36.7  C)  Resp 20  Wt 194 lb (88 kg)  SpO2 93%   BMI 33.56 kg/m2  GENERAL APPEARANCE:  Alert, in no distress  HEAD:  Normal, normocephalic, atraumatic  EYE EXAM: normal external eye, conjunctiva, lids, PAPI  NECK EXAM: supple, no JVD  CHEST/RESP:  respiratory effort normal, lung sounds CTA , no respiratory distress  CV:  Rate reg, rhythm reg, no murmur, 2+ peripheral edema  Bilateral pitting   GI/ABDOMEN:  normal bowel sounds, firm, nontender, no palpable masses  M/S:   extremities normal, gait normal-with rolling walker and uses wheelchair for longer distances, normal muscle tone, and range of motion  NEUROLOGIC EXAM: Normal gross motor movement, tone and coordination. No tremor.  Cranial nerves 2-12 are normal tested and grossly at patient's baseline  PSYCH:  Alert and oriented to self and surroundings with forgetfulness , affect pleasant , judgement appropriate     Recent Labs:    Reviewed in facility records     Assessment/Plan:  Essential hypertension  Noted to have elevated BP, concerning secondary to history of brain aneurysm. Per review of Care Everywhere, may have previously been on imdur 30 mg BID, now only 1x per day. She also has some increased LE edema, feelings of bloating and some mild dyspnea. On ccb, lasix, ace-i, imdur. For now, due to LE edema and dyspnea, will increase lasix, add low dose K+, monitor weights and labs.  Will monitor BP and may need to increase imdur.     Restrictive lung disease  Stable, no new symptoms. The current medical regimen is effective;  continue present plan and medications.     S/P laminectomy  Does have new back pain, has made an appointment for MRI and follow up with spine surgeon for this week.     Urinary incontinence, unspecified type  Chronic, long standing concerns with urinary incontinence, no new issues. Monitor.     Urinary tract infection without hematuria, site unspecified  Recurrent, seeing urology this week. Has completed antibiotics and is afebrile, no new concerns.       Orders:  1. Daily weights Dx  edema  2. DC Lasix 20 BID  3. Lasix 40 mg BID po Dx Edema and HTN  4. Potassium 20 meq po QD Dx Supplement  5. Lab draw 6/26/17 BMP and CBC Dx HTN and anemia      Electronically signed by  Kim Hidalgo CNP   [facility]  [phone] cell

## 2017-06-26 ENCOUNTER — HOSPITAL LABORATORY (OUTPATIENT)
Facility: OTHER | Age: 82
End: 2017-06-26

## 2017-06-26 ENCOUNTER — NURSING HOME VISIT (OUTPATIENT)
Dept: GERIATRICS | Facility: CLINIC | Age: 82
End: 2017-06-26
Payer: MEDICARE

## 2017-06-26 VITALS
OXYGEN SATURATION: 93 % | SYSTOLIC BLOOD PRESSURE: 148 MMHG | WEIGHT: 197 LBS | RESPIRATION RATE: 18 BRPM | DIASTOLIC BLOOD PRESSURE: 76 MMHG | TEMPERATURE: 97.7 F | HEART RATE: 75 BPM | BODY MASS INDEX: 34.08 KG/M2

## 2017-06-26 DIAGNOSIS — J98.4 RESTRICTIVE LUNG DISEASE: ICD-10-CM

## 2017-06-26 DIAGNOSIS — M96.1 FAILED BACK SYNDROME OF LUMBAR SPINE: ICD-10-CM

## 2017-06-26 DIAGNOSIS — N18.4 STAGE 4 CHRONIC KIDNEY DISEASE (H): ICD-10-CM

## 2017-06-26 DIAGNOSIS — R60.0 EDEMA OF BOTH LEGS: ICD-10-CM

## 2017-06-26 DIAGNOSIS — N18.4 ANEMIA OF CHRONIC RENAL FAILURE, STAGE 4 (SEVERE) (H): ICD-10-CM

## 2017-06-26 DIAGNOSIS — I67.1 BRAIN ANEURYSM: ICD-10-CM

## 2017-06-26 DIAGNOSIS — I10 ESSENTIAL HYPERTENSION: Primary | ICD-10-CM

## 2017-06-26 DIAGNOSIS — D63.1 ANEMIA OF CHRONIC RENAL FAILURE, STAGE 4 (SEVERE) (H): ICD-10-CM

## 2017-06-26 LAB
ANION GAP SERPL CALCULATED.3IONS-SCNC: 5 MMOL/L (ref 3–14)
BUN SERPL-MCNC: 34 MG/DL (ref 7–30)
CALCIUM SERPL-MCNC: 9.3 MG/DL (ref 8.5–10.1)
CHLORIDE SERPL-SCNC: 104 MMOL/L (ref 94–109)
CO2 SERPL-SCNC: 32 MMOL/L (ref 20–32)
CREAT SERPL-MCNC: 1.91 MG/DL (ref 0.52–1.04)
ERYTHROCYTE [DISTWIDTH] IN BLOOD BY AUTOMATED COUNT: 15.4 % (ref 10–15)
GFR SERPL CREATININE-BSD FRML MDRD: 25 ML/MIN/1.7M2
GLUCOSE SERPL-MCNC: 76 MG/DL (ref 70–99)
HCT VFR BLD AUTO: 34.6 % (ref 35–47)
HGB BLD-MCNC: 10.5 G/DL (ref 11.7–15.7)
MCH RBC QN AUTO: 27.6 PG (ref 26.5–33)
MCHC RBC AUTO-ENTMCNC: 30.3 G/DL (ref 31.5–36.5)
MCV RBC AUTO: 91 FL (ref 78–100)
PLATELET # BLD AUTO: 162 10E9/L (ref 150–450)
POTASSIUM SERPL-SCNC: 4.2 MMOL/L (ref 3.4–5.3)
RBC # BLD AUTO: 3.8 10E12/L (ref 3.8–5.2)
SODIUM SERPL-SCNC: 141 MMOL/L (ref 133–144)
WBC # BLD AUTO: 6.4 10E9/L (ref 4–11)

## 2017-06-26 PROCEDURE — 99207 ZZC CDG-UP CODE -MED NECESSITY: CPT | Performed by: FAMILY MEDICINE

## 2017-06-26 PROCEDURE — 99310 SBSQ NF CARE HIGH MDM 45: CPT | Performed by: FAMILY MEDICINE

## 2017-06-26 NOTE — PROGRESS NOTES
"[facility]    Chief Complaint   Patient presents with     Nursing Home Acute       HPI:    Jazmin Clifton is a 84 year old  (12/30/1932), who is being seen today for an episodic care visit at [facility] .  HPI information obtained from: facility chart records, facility staff and patient report, and nursing staff.    Today's concern is:  Essential hypertension  - denies HA or dizziness    Brain Aneurysm:\"  - reports that she had one repaired and the one under surveillance with CT every two years, no change so far, due for another CT soon.   - Told by Physician to keep SBP b/w 120-130, concerned about elevated BP lately.     Restrictive lung disease  - continues to c/p difficulty breathing with exertion, bending over, lying flat in bed.       Failed back syndrome of lumbar spine  - went for MRI, awaiting the result  - reports can live with pain level 3-4/10.   - Reports pain has  Not been doing well for the last week but does not know why.   - Pain is aching in nature, aggravated with movements, better with meds and rest.     Stage 4 chronic kidney disease (H)  - started on lasix for legs edema.   --------------------------------------------------------------------------------------------------------  ALLERGIES: Accupril; Ace inhibitors; Augmentin; Levofloxacin; Morphine; and Norvasc [amlodipine besylate]  Past Medical, Surgical, Family and Social History reviewed and updated in [facility].    Current Outpatient Prescriptions   Medication Sig Dispense Refill     LORazepam (ATIVAN PO) Take 0.5 mg by mouth as needed for anxiety       Docusate Sodium (COLACE PO) Take 100 mg by mouth daily       ipratropium - albuterol 0.5 mg/2.5 mg/3 mL (DUONEB) 0.5-2.5 (3) MG/3ML neb solution Take 1 vial by nebulization every 6 hours as needed for shortness of breath / dyspnea or wheezing       Memantine HCl (NAMENDA PO) Take 5 mg by mouth daily       nystatin (MYCOSTATIN) 224611 UNIT/GM POWD Apply topically 2 " times daily       OXYCODONE HCL PO Take 5 mg by mouth every 6 hours as needed       potassium chloride (KLOR-CON) 20 MEQ Packet Take 20 mEq by mouth daily       ASPIRIN PO Take 81 mg by mouth daily       MAGNESIUM OXIDE PO Take 250 mg by mouth daily       calcium carb 1250 mg, 500 mg [place],/vitamin D 200 units (OSCAL WITH D) 500-200 MG-UNIT per tablet Take 1 tablet by mouth daily       tiotropium (SPIRIVA) 18 MCG capsule Inhale 18 mcg into the lungs daily       budesonide-formoterol (SYMBICORT) 80-4.5 MCG/ACT Inhaler Inhale 2 puffs into the lungs 2 times daily       Acetaminophen (TYLENOL PO) Take 500 mg by mouth every 6 hours as needed for mild pain or fever       Furosemide (LASIX PO) Take 40 mg by mouth 2 times daily        Citalopram Hydrobromide (CELEXA PO) Take 10 mg by mouth daily       bisacodyl (DULCOLAX) 10 MG suppository Place 10 mg rectally daily as needed for constipation       isradipine (DYNACIRC) 5 MG TB24 Take 5 mg by mouth daily       Isosorbide Mononitrate (IMDUR PO) Take 30 mg by mouth daily       LISINOPRIL PO Take 20 mg by mouth daily        magnesium citrate solution Take 296 mLs by mouth once       magnesium hydroxide (MILK OF MAGNESIA) 400 MG/5ML suspension Take 30 mLs by mouth daily as needed for constipation or heartburn       Gabapentin (NEURONTIN PO) Take 300 mg by mouth daily And 1 tab po qd prn       Omeprazole (PRILOSEC PO) Take 20 mg by mouth every morning       cyanocobalamin (VITAMIN  B-12) 1000 MCG tablet Take 1,000 mcg by mouth daily       Simvastatin (ZOCOR PO) Take 10 mg by mouth At Bedtime         REVIEW OF SYSTEMS:  4 point ROS including Respiratory, CV, GI and , other than that noted in the HPI,  is negative    Physical Exam:  /76  Pulse 75  Temp 97.7  F (36.5  C)  Resp 18  Wt 197 lb (89.4 kg)  SpO2 93%  BMI 34.08 kg/m2  GENERAL APPEARANCE:  Alert, in no distress  ENT:  Mouth and posterior oropharynx normal, moist mucous membranes  NECK:  No JVD, no  adenopathy,masses or thyromegaly  RESP:  respiratory effort and palpation of chest normal, lungs clear to auscultation , no respiratory distress  CV:  Palpation and auscultation of heart done , regular rate and rhythm, no murmur, rub, or gallop, peripheral edema 2+ in pedal- pitting b/l.   ABDOMEN:  normal bowel sounds, soft, nontender, no hepatosplenomegaly or other masses  M/S:   Gait and station abnormal Uses a walker. proximal muscle atrophy.   NEURO:   Cranial nerves 2-12 are normal tested and grossly at patient's baseline, no purposeful movement in upper and lower extremities  PSYCH:  AAOx3. Mood and affect appropriate.     Recent Labs:  All labs reviewed, none recent  ------------------------------------------------------------------    Assessment/Plan:  Essential hypertension  - Hx of Brain Aneurysm repair  BP Readings from Last 3 Encounters:   06/26/17 148/76   06/20/17 137/68   06/12/17 142/77   - Advised to keep SBP b/w 120-130 mmHg  - will Increase Imdur frequency from 30 mg daily to bid- home dose.  - Will reduce Lasix due to worsening renal function.   - On isradipine 5 mg (max dose), lisinopril 20 mg, lasix 40 mg bid. Not BB possible 2/2 to advanced lung disease.   - Monitor BP closely.     Restrictive lung disease  - In COPD it is recommended to keep SpO2% b/w 88-92 to keep central breathing center stimulated.  - Adjust O2 based on the above.   - Has chronic LACEY. Possible 2/2 to cardio-pulmonary illnesses. May benefit from PFT and or Echo on outpatient basis if have not been done w/i 2 years, 6 months respectively. PCP to follow on these.     Failed back syndrome of lumbar spine  - MRI result showed L2-4 inter-discal gas resolved, L1-2 degenerative changes increased   - On oxycodone 5 mg q6h  - Patient will benefit from a referral to a pain medicine clinic for different modalities for instance media branches RF ablations, nerve stimulator, etc...    Stage 4 chronic kidney disease (H)  - Lasix  increased on 6/20th due to worsening legs edema and LACEY.   - Baseline in the system is GFR 56 (Feb 2016)  - BUN/Crtn ratio > 20x.   - Renal function worsening, and Pt is concerned about the renal failure stage and does not want to end up with HD.   - Will reduce lasix from 40 mg  Bid to 20 mg bid, reduce KCL from 20 mEq to 10 mEq daily.   - Recheck BMP in one week,   - Avoid nephrotoxic drugs  - Renal dose the medications.   - Hx of CKD stage 3 (2006).   - Needs Nephro follow up.       Anemia of chronic renal failure, stage 4 (severe) (H)  - HH stable.   - This could contribute to LACEY as well, but still I feel it is 2/2 to pulmonary issue in the first place.     Edema:  Wt Readings from Last 4 Encounters:   06/26/17 197 lb (89.4 kg)   06/20/17 194 lb (88 kg)   06/12/17 191 lb (86.6 kg)   05/02/17 200 lb (90.7 kg)   - Positive orthopnea/ bendepnea, legs swelling, no JVD.  - Echo 2007 showed Nl EF, suggestive diastolic HF.  - Edema worse since admission to this facility since Pt does not lift up legs. Counseled.     #The above assessment and plan was discussed with the patient in detail, and pt is in agreement; patient  asked questions which were nswered in detail, and patient verbalized understanding. .    Orders:  - See above, otherwise, continue the rest of the current POC.       Total time spent with patient visit at the skilled nursing facility was 45 min including patient visit and review of past records. Greater than 50% of total time spent with counseling and coordinating care due to LACEY, reduce legs edema counslling, discussing further testing for LACEY, BP targets and what needs to be done.     Electronically signed by  Toño Shafer MD

## 2017-06-29 ENCOUNTER — NURSING HOME VISIT (OUTPATIENT)
Dept: GERIATRICS | Facility: CLINIC | Age: 82
End: 2017-06-29
Payer: COMMERCIAL

## 2017-06-29 VITALS
RESPIRATION RATE: 14 BRPM | HEART RATE: 75 BPM | DIASTOLIC BLOOD PRESSURE: 73 MMHG | SYSTOLIC BLOOD PRESSURE: 131 MMHG | OXYGEN SATURATION: 93 % | TEMPERATURE: 97.9 F

## 2017-06-29 DIAGNOSIS — R42 DIZZINESS: Primary | ICD-10-CM

## 2017-06-29 DIAGNOSIS — I63.9 CEREBROVASCULAR ACCIDENT (CVA), UNSPECIFIED MECHANISM (H): ICD-10-CM

## 2017-06-29 PROCEDURE — 99310 SBSQ NF CARE HIGH MDM 45: CPT | Performed by: FAMILY MEDICINE

## 2017-06-29 PROCEDURE — 99207 ZZC CDG-CORRECTLY CODED, REVIEWED AND AGREE: CPT | Performed by: FAMILY MEDICINE

## 2017-06-29 NOTE — PROGRESS NOTES
[facility]    Chief Complaint   Patient presents with     Nursing Home Acute       HPI:    Jazmin Clifton is a 84 year old  (12/30/1932), who is being seen today for an episodic care visit at [facility] .  HPI information obtained from: facility chart records, facility staff and patient report.Today's concern is:  Dizziness  - RN asked the writer to see the patient who developed dizziness during therapy.   - Pt reports that the minutes she sat down on the machine for exercise she felt fainted, associated with blurry vision, short of breath, feeling shaky, fast heart beat. Noticed that she is salivating more than usual and has to wipe it very often.   - Denies chest pain, sweating, nausea, vomiting or weakness in limbs  - Reports this episode of feeling fainting lasted for 30 minutes,  But still has excessive salivation.   - no change in the speech tone noticed either by the patient or by the nursing staff.   - RN reports VSS.   - Reports hx of vertigo in the past but this is different.   ---------------------------------------------------------------------------------    ALLERGIES: Accupril; Ace inhibitors; Augmentin; Levofloxacin; Morphine; and Norvasc [amlodipine besylate]  Past Medical, Surgical, Family and Social History reviewed and updated in [employer].    Current Outpatient Prescriptions   Medication Sig Dispense Refill     LORazepam (ATIVAN PO) Take 0.5 mg by mouth as needed for anxiety       Docusate Sodium (COLACE PO) Take 100 mg by mouth daily       ipratropium - albuterol 0.5 mg/2.5 mg/3 mL (DUONEB) 0.5-2.5 (3) MG/3ML neb solution Take 1 vial by nebulization every 6 hours as needed for shortness of breath / dyspnea or wheezing       Memantine HCl (NAMENDA PO) Take 5 mg by mouth daily       nystatin (MYCOSTATIN) 031771 UNIT/GM POWD Apply topically 2 times daily       OXYCODONE HCL PO Take 5 mg by mouth every 6 hours as needed       potassium chloride (KLOR-CON) 20 MEQ Packet  Take 20 mEq by mouth daily       ASPIRIN PO Take 81 mg by mouth daily       MAGNESIUM OXIDE PO Take 250 mg by mouth daily       calcium carb 1250 mg, 500 mg [place],/vitamin D 200 units (OSCAL WITH D) 500-200 MG-UNIT per tablet Take 1 tablet by mouth daily       tiotropium (SPIRIVA) 18 MCG capsule Inhale 18 mcg into the lungs daily       budesonide-formoterol (SYMBICORT) 80-4.5 MCG/ACT Inhaler Inhale 2 puffs into the lungs 2 times daily       Acetaminophen (TYLENOL PO) Take 500 mg by mouth every 6 hours as needed for mild pain or fever       Furosemide (LASIX PO) Take 40 mg by mouth 2 times daily        Citalopram Hydrobromide (CELEXA PO) Take 10 mg by mouth daily       bisacodyl (DULCOLAX) 10 MG suppository Place 10 mg rectally daily as needed for constipation       isradipine (DYNACIRC) 5 MG TB24 Take 5 mg by mouth daily       Isosorbide Mononitrate (IMDUR PO) Take 30 mg by mouth daily       LISINOPRIL PO Take 20 mg by mouth daily        magnesium citrate solution Take 296 mLs by mouth once       magnesium hydroxide (MILK OF MAGNESIA) 400 MG/5ML suspension Take 30 mLs by mouth daily as needed for constipation or heartburn       Gabapentin (NEURONTIN PO) Take 300 mg by mouth daily And 1 tab po qd prn       Omeprazole (PRILOSEC PO) Take 20 mg by mouth every morning       cyanocobalamin (VITAMIN  B-12) 1000 MCG tablet Take 1,000 mcg by mouth daily       Simvastatin (ZOCOR PO) Take 10 mg by mouth At Bedtime       Medications reviewed: in the chart and EHR.     REVIEW OF SYSTEMS:  Positive noted above, otherwise negative    Physical Exam:  /73  Pulse 75  Temp 97.9  F (36.6  C)  Resp 14  SpO2 93%  GENERAL APPEARANCE:  Alert, cooperative, tired looking.   ENT:  Mouth and posterior oropharynx normal, moist mucous membranes, excessive salivation, with frequent wiping.   EYES:  EOM, conjunctivae, lids, pupils and irises normal, PERRL, EOM normal, pupil size 2 mm.   RESP:  respiratory effort and palpation of chest  normal, lungs clear to auscultation , no respiratory distress  CV:  Palpation and auscultation of heart done , regular rate and rhythm, no murmur, rub, or gallop  M/S:   Gait and station abnormal uses a WC. muscles strength 4/5 right hand , rest 5/5.   NEURO:   CN 2-12 tested (except olfactory nerve): superior visual field bilaterally diminished. Facial droop on the left. DTR: 2+ triceps and knee reflexes. PRONATOR DRIFT: negative. Cerebellar testing: nose to finger slow and inaccurate, dysdiadochokinesia, Romberg sign positive.     Recent Labs:    Results for orders placed or performed in visit on 06/26/17   Basic metabolic panel   Result Value Ref Range    Sodium 141 133 - 144 mmol/L    Potassium 4.2 3.4 - 5.3 mmol/L    Chloride 104 94 - 109 mmol/L    Carbon Dioxide 32 20 - 32 mmol/L    Anion Gap 5 3 - 14 mmol/L    Glucose 76 70 - 99 mg/dL    Urea Nitrogen 34 (H) 7 - 30 mg/dL    Creatinine 1.91 (H) 0.52 - 1.04 mg/dL    GFR Estimate 25 (L) >60 mL/min/1.7m2    GFR Estimate If Black 30 (L) >60 mL/min/1.7m2    Calcium 9.3 8.5 - 10.1 mg/dL   CBC with platelets   Result Value Ref Range    WBC 6.4 4.0 - 11.0 10e9/L    RBC Count 3.80 3.8 - 5.2 10e12/L    Hemoglobin 10.5 (L) 11.7 - 15.7 g/dL    Hematocrit 34.6 (L) 35.0 - 47.0 %    MCV 91 78 - 100 fl    MCH 27.6 26.5 - 33.0 pg    MCHC 30.3 (L) 31.5 - 36.5 g/dL    RDW 15.4 (H) 10.0 - 15.0 %    Platelet Count 162 150 - 450 10e9/L         Assessment/Plan:  Dizziness  Cerebrovascular accident (CVA), unspecified mechanism (H)  - Left facial droop with excessive salivation, weakness right hang , positive romberg sing and  Dysdiadochokinesia- Hx of brain aneurysm 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   - will send to ED for brain imaging to r/o or r/i CVA.       Orders:  - see above.       Electronically signed by  Toño Shafer MD

## 2017-07-03 ENCOUNTER — HOSPITAL LABORATORY (OUTPATIENT)
Facility: OTHER | Age: 82
End: 2017-07-03

## 2017-07-03 LAB
ANION GAP SERPL CALCULATED.3IONS-SCNC: 4 MMOL/L (ref 3–14)
BUN SERPL-MCNC: 23 MG/DL (ref 7–30)
CALCIUM SERPL-MCNC: 9.2 MG/DL (ref 8.5–10.1)
CHLORIDE SERPL-SCNC: 106 MMOL/L (ref 94–109)
CO2 SERPL-SCNC: 30 MMOL/L (ref 20–32)
CREAT SERPL-MCNC: 1.43 MG/DL (ref 0.52–1.04)
GFR SERPL CREATININE-BSD FRML MDRD: 35 ML/MIN/1.7M2
GLUCOSE SERPL-MCNC: 86 MG/DL (ref 70–99)
POTASSIUM SERPL-SCNC: 4.1 MMOL/L (ref 3.4–5.3)
SODIUM SERPL-SCNC: 140 MMOL/L (ref 133–144)

## 2017-07-05 ENCOUNTER — DISCHARGE SUMMARY NURSING HOME (OUTPATIENT)
Dept: GERIATRICS | Facility: CLINIC | Age: 82
End: 2017-07-05
Payer: COMMERCIAL

## 2017-07-05 VITALS
TEMPERATURE: 98.4 F | WEIGHT: 198.4 LBS | OXYGEN SATURATION: 92 % | DIASTOLIC BLOOD PRESSURE: 90 MMHG | BODY MASS INDEX: 34.32 KG/M2 | RESPIRATION RATE: 18 BRPM | HEART RATE: 65 BPM | SYSTOLIC BLOOD PRESSURE: 145 MMHG

## 2017-07-05 DIAGNOSIS — N39.0 URINARY TRACT INFECTION WITHOUT HEMATURIA, SITE UNSPECIFIED: Primary | ICD-10-CM

## 2017-07-05 DIAGNOSIS — M51.369 DDD (DEGENERATIVE DISC DISEASE), LUMBAR: ICD-10-CM

## 2017-07-05 DIAGNOSIS — Z98.890 S/P LAMINECTOMY: ICD-10-CM

## 2017-07-05 PROCEDURE — 99207 ZZC CDG-CORRECTLY CODED, REVIEWED AND AGREE: CPT | Performed by: NURSE PRACTITIONER

## 2017-07-05 PROCEDURE — 99316 NF DSCHRG MGMT 30 MIN+: CPT | Performed by: NURSE PRACTITIONER

## 2017-07-05 NOTE — PROGRESS NOTES
[facility] DISCHARGE SUMMARY    PATIENT'S NAME: Jazmin Clifton  YOB: 1932  MEDICAL RECORD NUMBER:  [id number]    PRIMARY CARE PROVIDER AND CLINIC RESPONSIBLE AFTER TRANSFER: Mirian Aguilera [facility] [id number] [address] / CATIE WEAVER *     CODE STATUS/ADVANCE DIRECTIVES DISCUSSION:   CPR/Full code        Allergies   Allergen Reactions     Accupril      Ace Inhibitors      Accupril     Augmentin      Levofloxacin      Morphine      Norvasc [Amlodipine Besylate]      Leg swelling         TRANSFERRING PROVIDERS: MARY Tejada CNP, Toño Shafer MD  DATE OF SNF ADMISSION:  [place] / 10 / 2017  DATE OF SNF (anticipated) DISCHARGE: July / 06 / 2017  DISCHARGE DISPOSITION: Non-[facility] Provider   Nursing Facility: [facility] from 6/10/17 through 7/6/17  Home.     Condition on Discharge:  Improving.  Function:  300 ft ambulation with rolling walker, independent with cares.   Cognitive Scores: mild forgetfulness    Equipment: walker    DISCHARGE DIAGNOSIS:   1. Urinary tract infection without hematuria, site unspecified    2. DDD (degenerative disc disease), lumbar    3. S/P laminectomy        HPI Nursing Facility Course:  HPI information obtained from: facility chart records, facility staff and patient report.    Urinary tract infection without hematuria, site unspecified  Admitted with UTI, weakness, inability to care for self at home, has improved in ambulation and mentation. Is planning assisted living facility admission, but is awaiting placement -she is on several waiting lists.     DDD (degenerative disc disease), lumbar  S/P laminectomy  Mild pain, recent MRI spine and will be seeing spine surgeon in follow up. Also has complaints of R hip/buttocks pain, may benefit from repeat steroid injection by primary care provider.       PAST MEDICAL HISTORY:  has a past medical history of ABDOMINAL PAIN GENERALIZED (3/15/2006); Abdominal pain, generalized (3/15/2006);  Atherosclerosis of renal artery (H); BENIGN HYPERTENSION (5/1/2006); Benign neoplasm of scalp and skin of neck; Cerebral aneurysm, nonruptured; Depressive disorder, not elsewhere classified; Esophageal reflux; Female stress incontinence; 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    DISCHARGE MEDICATIONS:  Current Outpatient Prescriptions   Medication Sig Dispense Refill     TRIMETHOPRIM PO Take 100 mg by mouth daily Give 1 tablet by mouth one time a day for preventative for UTI       guaiFENesin (ROBITUSSIN) 100 MG/5ML SYRP Take 10 mLs by mouth every 6 hours as needed for cough       Benzocaine-Glycerin-DM (CEPACOL DUAL RELIEF PO) Give 1 dose by mouth every 2 hours as needed for Sore throat Per RSO - package instructions 1 lozenge Q 2 hours.       LORazepam (ATIVAN PO) Take 0.5 mg by mouth as needed for anxiety       Docusate Sodium (COLACE PO) Take 100 mg by mouth daily       ipratropium - albuterol 0.5 mg/2.5 mg/3 mL (DUONEB) 0.5-2.5 (3) MG/3ML neb solution Take 1 vial by nebulization every 6 hours as needed for shortness of breath / dyspnea or wheezing       Memantine HCl (NAMENDA PO) Take 5 mg by mouth daily       nystatin (MYCOSTATIN) 927518 UNIT/GM POWD Apply topically 2 times daily       OXYCODONE HCL PO Take 5 mg by mouth every 6 hours as needed       potassium chloride (KLOR-CON) 20 MEQ Packet Take 20 mEq by mouth daily Give 10 mEq by mouth one time a day for supplement       ASPIRIN PO Take 81 mg by mouth daily       MAGNESIUM OXIDE PO Take 250 mg by mouth daily       calcium carb 1250 mg, 500 mg [place],/vitamin D 200 units (OSCAL WITH D) 500-200 MG-UNIT per tablet Take 1 tablet by mouth daily       tiotropium (SPIRIVA) 18 MCG  capsule Inhale 18 mcg into the lungs daily       budesonide-formoterol (SYMBICORT) 80-4.5 MCG/ACT Inhaler Inhale 2 puffs into the lungs 2 times daily       Acetaminophen (TYLENOL PO) Take 500 mg by mouth every 6 hours as needed for mild pain or fever       Furosemide (LASIX PO) Take 40 mg by mouth 2 times daily       Citalopram Hydrobromide (CELEXA PO) Take 10 mg by mouth daily Give 2 tablet by mouth one time a day       isradipine (DYNACIRC) 5 MG TB24 Take 5 mg by mouth daily       Isosorbide Mononitrate (IMDUR PO) Take 30 mg by mouth 2 times daily        LISINOPRIL PO Take 20 mg by mouth daily        Gabapentin (NEURONTIN PO) Take 300 mg by mouth 3 times daily Give 1 capsule by mouth three times a day for neuropathic pain       Omeprazole (PRILOSEC PO) Take 20 mg by mouth every morning       cyanocobalamin (VITAMIN  B-12) 1000 MCG tablet Take 1,000 mcg by mouth daily       Simvastatin (ZOCOR PO) Take 10 mg by mouth At Bedtime         MEDICATION CHANGES/RATIONALE:   Completed antibiotics     Controlled medications sent with patient: Script for oxycodone 5 mg medication for 30 tabs and 0 refills given to patient at dischage to have them fill at their out patient pharmacy and Script for ativan medication for 15 tabs and 0 refills given to patient at dischage to have them fill at their out patient pharmacy     ROS:    4 point ROS including Respiratory, CV, GI and , other than that noted in the HPI,  is negative    Physical Exam:   Vitals: /90  Pulse 65  Temp 98.4  F (36.9  C)  Resp 18  Wt 198 lb 6.4 oz (90 kg)  SpO2 92%  BMI 34.32 kg/m2  BMI= Body mass index is 34.32 kg/(m^2).    GENERAL APPEARANCE:  Alert, in no distress, cooperative  RESP:  respiratory effort and palpation of chest normal, lungs clear to auscultation , no respiratory distress  CV:  Palpation and auscultation of heart done , regular rate and rhythm, no murmur, rub, or gallop, peripheral edema 2-3+ in bilateral LE  NEURO:   Cranial  nerves 2-12 are normal tested and grossly at patient's baseline  PSYCH:  oriented X 3, memory impaired , affect and mood normal    DISCHARGE PLAN:  Occupational Therapy, Physical Therapy, Registered Nurse, Home Health Aide and From:  [facility]  Patient instructed to follow-up with:  PCP in 7 days      Current [place] scheduled appointments:  No future appointments.    MTM referral needed and placed by this provider: No    Pending labs: none  SNF labs   CBC RESULTS:   Recent Labs   Lab Test  06/26/17   0655  02/05/16   0735  01/29/16   0643   WBC  6.4   --   5.1   RBC  3.80   --   3.23*   HGB  10.5*  10.2*  9.4*   HCT  34.6*   --   30.6*   MCV  91   --   95   MCH  27.6   --   29.1   MCHC  30.3*   --   30.7*   RDW  15.4*   --   14.5   PLT  162   --   227       Last Basic Metabolic Panel:  Recent Labs   Lab Test  07/03/17   0620  06/26/17   0655   NA  140  141   POTASSIUM  4.1  4.2   CHLORIDE  106  104   BLAIR  9.2  9.3   CO2  30  32   BUN  23  34*   CR  1.43*  1.91*   GLC  86  76       Discharge Treatments: none    TOTAL DISCHARGE TIME:   Greater than 30 minutes  Electronically signed by:  MARY Tejada CNP

## 2017-07-23 VITALS
RESPIRATION RATE: 18 BRPM | WEIGHT: 196 LBS | TEMPERATURE: 97.8 F | BODY MASS INDEX: 33.91 KG/M2 | DIASTOLIC BLOOD PRESSURE: 81 MMHG | HEART RATE: 67 BPM | SYSTOLIC BLOOD PRESSURE: 183 MMHG | OXYGEN SATURATION: 97 %

## 2017-07-23 NOTE — PROGRESS NOTES
[facility]  PRIMARY CARE PROVIDER AND CLINIC:  Mirian Aguilera [facility] [id number] [address] / CATIE WEAVER *  Chief Complaint   Patient presents with     Hospital F/U       HPI:    Jazmin Clifton is a 84 year old  (12/30/1932),admitted to the [facility]  from [facility].  Hospital stay 7/20/17 through 7/21/17.  Admitted to this facility for  rehab, medical management and nursing care.        Current issues are:        Pt admitted to this facility from June 10th until July 6th as a direct admission for weakness and hypotension. Transferred to [facility] in a stable condition with a plan of OT/PT/Nursing and HHA from [facility] on out patient basis.  Continues to have weakness admitted to [facility] for weakness and dizziness. Seen by Rehab specialist recommended admission to TCU for rehab.     - Care giver reports that saw the Pt two days prior to the episode of the weakness and was fine walking at her place using a walker. On the day of admission to the hospital she was very weak and could not walk. Went to the Ortho's clinic was found to have a very low BP 90's / 48th, sent to ER, admitted for overnight, IVF  Given, UA came negative. Also, reports that she felt the ceiling  Was spinning around her, a maneuver was done which helped. Reports that this am while watching TV suddenly felt that the TV and the bathroom door were on the floor.   - Reports two aneurysm repair in the head, and still has one.   - Care giver concerned if Ms. Clifton had a stroke.   - During her stay at [facility] the writer sent the Pt to the ED for stroke like symptoms. However, no imaging was done.   - Denies chest pain,  Reports feels weak and cannot stand up without a support .      CODE STATUS/ADVANCE DIRECTIVES DISCUSSION:   CPR/Full code   Patient's living condition: lives with family, Son     ALLERGIES:Accupril; Ace inhibitors; Augmentin; Levofloxacin; Morphine; and Norvasc [amlodipine  besylate]  PAST MEDICAL HISTORY:  has a past medical history of ABDOMINAL PAIN GENERALIZED (3/15/2006); Abdominal pain, generalized (3/15/2006); Atherosclerosis of renal artery (H); BENIGN HYPERTENSION (5/1/2006); Benign neoplasm of scalp and skin of neck; Cerebral aneurysm, nonruptured; Depressive disorder, not elsewhere classified; Esophageal reflux; Female stress incontinence; 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  PAST SURGICAL HISTORY:  has a past surgical history that includes surgical history of -; surgical history of -; surgical history of - (1996); surgical history of -; surgical history of -; cholecystectomy, laporoscopic (1997); and hysterectomy, mirtha (1982).  FAMILY HISTORY: family history includes Asthma in her son; CANCER in her brother and mother; CEREBROVASCULAR DISEASE in her father; DIABETES in her son; Eye Disorder in her son; GASTROINTESTINAL DISEASE in her son; HEART DISEASE in her father. There is no history of C.A.D., Hypertension, Breast Cancer, Cancer - colorectal, or Prostate Cancer.  SOCIAL HISTORY:  reports that she has quit smoking. She does not have any smokeless tobacco history on file. She reports that she drinks alcohol. She reports that she does not use illicit drugs.    Post Discharge Medication Reconciliation Status: discharge medications reconciled and changed, per note/orders (see AVS).  Current Outpatient Prescriptions   Medication Sig Dispense Refill     TRIMETHOPRIM PO Take 100 mg by mouth daily Give 1 tablet by mouth one time a day for preventative for UTI       guaiFENesin (ROBITUSSIN) 100 MG/5ML SYRP Take 10 mLs by mouth every 6 hours as needed for cough       Benzocaine-Glycerin-DM (CEPACOL  DUAL RELIEF PO) Give 1 dose by mouth every 2 hours as needed for Sore throat Per RSO - package instructions 1 lozenge Q 2 hours.       LORazepam (ATIVAN PO) Take 0.5 mg by mouth as needed for anxiety       Docusate Sodium (COLACE PO) Take 100 mg by mouth daily       ipratropium - albuterol 0.5 mg/2.5 mg/3 mL (DUONEB) 0.5-2.5 (3) MG/3ML neb solution Take 1 vial by nebulization every 6 hours as needed for shortness of breath / dyspnea or wheezing       Memantine HCl (NAMENDA PO) Take 5 mg by mouth daily       nystatin (MYCOSTATIN) 586246 UNIT/GM POWD Apply topically 2 times daily       OXYCODONE HCL PO Take 5 mg by mouth every 6 hours as needed       potassium chloride (KLOR-CON) 20 MEQ Packet Take 20 mEq by mouth daily Give 10 mEq by mouth one time a day for supplement       ASPIRIN PO Take 81 mg by mouth daily       MAGNESIUM OXIDE PO Take 250 mg by mouth daily       calcium carb 1250 mg, 500 mg [place],/vitamin D 200 units (OSCAL WITH D) 500-200 MG-UNIT per tablet Take 1 tablet by mouth daily       tiotropium (SPIRIVA) 18 MCG capsule Inhale 18 mcg into the lungs daily       budesonide-formoterol (SYMBICORT) 80-4.5 MCG/ACT Inhaler Inhale 2 puffs into the lungs 2 times daily       Acetaminophen (TYLENOL PO) Take 500 mg by mouth every 6 hours as needed for mild pain or fever       Furosemide (LASIX PO) Take 40 mg by mouth 2 times daily       Citalopram Hydrobromide (CELEXA PO) Take 10 mg by mouth daily Give 2 tablet by mouth one time a day       isradipine (DYNACIRC) 5 MG TB24 Take 5 mg by mouth daily       Isosorbide Mononitrate (IMDUR PO) Take 30 mg by mouth 2 times daily        LISINOPRIL PO Take 20 mg by mouth daily        Gabapentin (NEURONTIN PO) Take 300 mg by mouth 3 times daily Give 1 capsule by mouth three times a day for neuropathic pain       Omeprazole (PRILOSEC PO) Take 20 mg by mouth every morning       cyanocobalamin (VITAMIN  B-12) 1000 MCG tablet Take 1,000 mcg by mouth daily       Simvastatin (ZOCOR  PO) Take 10 mg by mouth At Bedtime         ROS:  10 point ROS of systems including Constitutional, Eyes, Respiratory, Cardiovascular, Gastroenterology, Genitourinary, Integumentary, Muscularskeletal, Psychiatric were all negative except for pertinent positives noted in my HPI.    Exam:  /81  Pulse 67  Temp 97.8  F (36.6  C)  Resp 18  Wt 196 lb (88.9 kg)  SpO2 97%  BMI 33.91 kg/m2  GENERAL APPEARANCE:  Alert, in no distress  ENT:  Mouth and posterior oropharynx normal, moist mucous membranes  EYES:  EOM, conjunctivae, lids, pupils and irises normal  NECK:  No adenopathy,masses or thyromegaly  RESP:  respiratory effort and palpation of chest normal, lungs clear to auscultation , no respiratory distress  CV:  Palpation and auscultation of heart done , regular rate and rhythm, no murmur, rub, or gallop, peripheral edema traces in legs+ in pitting  ABDOMEN:  normal bowel sounds, soft, nontender, no hepatosplenomegaly or other masses  M/S:   Gait and station abnormal weakness proximal muscles. Gait unsteady, used walker  SKIN:  Inspection of skin and subcutaneous tissue baseline, Palpation of skin and subcutaneous tissue baseline  NEURO:   Cranial nerves 2-12 are normal tested and grossly at patient's baseline, Ms strength 5/5 all extremities, DTR 1+ b/l knee. , no purposeful movement in upper and lower extremities. Cerebellar testing: nose to finger good pace, no  dysdiadochokinesia,   PSYCH:  oriented X 3, affect and mood normal, shows some issue with recalling recent event and missed some detail     Lab/Diagnostic data: All labs reviewed, none recent     ASSESSMENT/PLAN:  Dizziness:  Generalized muscle weakness  - ? Vertigo.  - Hx of TIA  - Hx of cerebral aneurysm repair.   - The writer witnessed Pts episode of stroke on June 30, sent to ED for further work up. Michael the story lost on the way, and was seen in the ED for weakness, UA and chemistry panel were negative, no imaging was done, sent back to TCU.    - on multiple antihypertensive regiment, isradipine known for causing dizziness and edema, will dc.   - Will reduce lasix to 20 mg  Bid and KCL from 20 meq to 10 meq (K 4.1 on Jul 3rd)  - autonomic dysfunction is a possibility, will check orthostasis VS in am x3 days.   - recheck BMP in three days  - Will do Brain scan w/wo contrast. Follow on the result.   - May need referral to neuro / ENT.     HTN:  - episodes of hypotension  BP Readings from Last 3 Encounters:   07/23/17 183/81   07/05/17 145/90   06/29/17 131/73   - on isradipine 5 mg bid, Imdur ER 30 mg daily, Lasix 40 mg bid, Lisinopril 20 mg daily. Will Dc isradipine.  - may start metoprolol or HCTZ if SBP very elevated.        Chronic obstructive pulmonary disease, unspecified COPD type (H)  - Has chronic LACEY. Possible 2/2 to cardio-pulmonary illnesses. May benefit from PFT and or Echo on outpatient basis if have not been done w/i 2 years, 6 months respectively    Failed back syndrome of lumbar spine:  - analgesia optimal      History of depression  - on Celexa 20 mg, increased from 10 mg during hospitalization in April    Alzheimer's dementia without behavioral disturbance, unspecified timing of dementia onset  - On Namenda/donepezil, continue. Monitor for ADEs.     The above assessment and plan was discussed with the patient in detail, and pt is in agreement; patient  asked questions which were nswered in detail, and patient verbalized understanding. .      Electronically signed by:  Toño Shafer MD

## 2017-07-24 ENCOUNTER — NURSING HOME VISIT (OUTPATIENT)
Dept: GERIATRICS | Facility: CLINIC | Age: 82
End: 2017-07-24
Payer: COMMERCIAL

## 2017-07-24 DIAGNOSIS — I67.1 CEREBRAL ANEURYSM, NONRUPTURED: ICD-10-CM

## 2017-07-24 DIAGNOSIS — F02.80 ALZHEIMER'S DEMENTIA WITHOUT BEHAVIORAL DISTURBANCE, UNSPECIFIED TIMING OF DEMENTIA ONSET: ICD-10-CM

## 2017-07-24 DIAGNOSIS — R42 VERTIGO: ICD-10-CM

## 2017-07-24 DIAGNOSIS — I10 ESSENTIAL HYPERTENSION: ICD-10-CM

## 2017-07-24 DIAGNOSIS — M96.1 FAILED BACK SYNDROME: ICD-10-CM

## 2017-07-24 DIAGNOSIS — R42 DIZZINESS: Primary | ICD-10-CM

## 2017-07-24 DIAGNOSIS — G30.9 ALZHEIMER'S DEMENTIA WITHOUT BEHAVIORAL DISTURBANCE, UNSPECIFIED TIMING OF DEMENTIA ONSET: ICD-10-CM

## 2017-07-24 DIAGNOSIS — Z86.73 HISTORY OF TIA (TRANSIENT ISCHEMIC ATTACK) AND STROKE: ICD-10-CM

## 2017-07-24 DIAGNOSIS — M62.81 GENERALIZED MUSCLE WEAKNESS: ICD-10-CM

## 2017-07-24 PROCEDURE — 99207 ZZC CDG-CORRECTLY CODED, REVIEWED AND AGREE: CPT | Performed by: FAMILY MEDICINE

## 2017-07-24 PROCEDURE — 99306 1ST NF CARE HIGH MDM 50: CPT | Performed by: FAMILY MEDICINE

## 2017-07-25 ENCOUNTER — NURSING HOME VISIT (OUTPATIENT)
Dept: GERIATRICS | Facility: CLINIC | Age: 82
End: 2017-07-25
Payer: COMMERCIAL

## 2017-07-25 VITALS
OXYGEN SATURATION: 94 % | DIASTOLIC BLOOD PRESSURE: 77 MMHG | RESPIRATION RATE: 20 BRPM | SYSTOLIC BLOOD PRESSURE: 149 MMHG | TEMPERATURE: 97.3 F | HEART RATE: 60 BPM

## 2017-07-25 DIAGNOSIS — I10 ESSENTIAL HYPERTENSION: ICD-10-CM

## 2017-07-25 DIAGNOSIS — M16.11 OSTEOARTHRITIS OF RIGHT HIP, UNSPECIFIED OSTEOARTHRITIS TYPE: ICD-10-CM

## 2017-07-25 DIAGNOSIS — I95.9 HYPOTENSION, UNSPECIFIED HYPOTENSION TYPE: ICD-10-CM

## 2017-07-25 DIAGNOSIS — J44.9 CHRONIC OBSTRUCTIVE PULMONARY DISEASE, UNSPECIFIED COPD TYPE (H): ICD-10-CM

## 2017-07-25 DIAGNOSIS — R42 DIZZINESS: Primary | ICD-10-CM

## 2017-07-25 DIAGNOSIS — M62.81 GENERALIZED MUSCLE WEAKNESS: ICD-10-CM

## 2017-07-25 PROCEDURE — 99310 SBSQ NF CARE HIGH MDM 45: CPT | Performed by: NURSE PRACTITIONER

## 2017-07-25 PROCEDURE — 99207 ZZC CDG-CORRECTLY CODED, REVIEWED AND AGREE: CPT | Performed by: NURSE PRACTITIONER

## 2017-07-25 NOTE — PROGRESS NOTES
[facility]    Chief Complaint   Patient presents with     Nursing Home Acute       HPI:    Jazmin Clifton is a 84 year old  (12/30/1932), who is being seen today for an episodic care visit at [facility] .  HPI information obtained from: facility chart records, facility staff and patient report.Today's concern is:     Dizziness  Essential hypertension  Hypotension, unspecified hypotension type  Generalized muscle weakness  Chronic obstructive pulmonary disease, unspecified COPD type (H)  Osteoarthritis of right hip, unspecified osteoarthritis type     Patient continues to have dizziness today but feels it is improved with decrease in BP medications, pain in R hip which is limiting her movement. She is worried that pain medications worsen her dizziness. She reports that she wanted to have injection in R hip, but was unable to as her BP was too low (75/37) and she was so weak. She had just discharged from TCU a few days before this,a nd had gradually become weaker.     ALLERGIES: Accupril; Ace inhibitors; Augmentin; Levofloxacin; Morphine; and Norvasc [amlodipine besylate]  Past Medical, Surgical, Family and Social History reviewed and updated in [facility].    Current Outpatient Prescriptions   Medication Sig Dispense Refill     TRIMETHOPRIM PO Take 100 mg by mouth daily Give 1 tablet by mouth one time a day for preventative for UTI       Benzocaine-Glycerin-DM (CEPACOL DUAL RELIEF PO) Give 1 dose by mouth every 2 hours as needed for Sore throat Per RSO - package instructions 1 lozenge Q 2 hours.       Docusate Sodium (COLACE PO) Take 100 mg by mouth daily       Memantine HCl (NAMENDA PO) Take 5 mg by mouth daily       nystatin (MYCOSTATIN) 413469 UNIT/GM POWD Apply topically 2 times daily       OXYCODONE HCL PO Take 5 mg by mouth every 6 hours as needed       potassium chloride (KLOR-CON) 20 MEQ Packet Take 10 mEq by mouth daily Give 10 mEq by mouth one time a day for supplement        ASPIRIN PO Take 81 mg by mouth daily       MAGNESIUM OXIDE PO Take 250 mg by mouth daily       calcium carb 1250 mg, 500 mg [place],/vitamin D 200 units (OSCAL WITH D) 500-200 MG-UNIT per tablet Take 1 tablet by mouth daily       tiotropium (SPIRIVA) 18 MCG capsule Inhale 18 mcg into the lungs daily       budesonide-formoterol (SYMBICORT) 80-4.5 MCG/ACT Inhaler Inhale 2 puffs into the lungs 2 times daily       Acetaminophen (TYLENOL PO) Take 500 mg by mouth every 6 hours as needed for mild pain or fever       Furosemide (LASIX PO) Take 20 mg by mouth 2 times daily        Citalopram Hydrobromide (CELEXA PO) Take 10 mg by mouth daily Give 2 tablet by mouth one time a day       Isosorbide Mononitrate (IMDUR PO) Take 30 mg by mouth daily        LISINOPRIL PO Take 20 mg by mouth daily        Gabapentin (NEURONTIN PO) Take 300 mg by mouth 3 times daily Give 1 capsule by mouth three times a day for neuropathic pain       Omeprazole (PRILOSEC PO) Take 20 mg by mouth every morning       cyanocobalamin (VITAMIN  B-12) 1000 MCG tablet Take 1,000 mcg by mouth daily       Simvastatin (ZOCOR PO) Take 10 mg by mouth At Bedtime       Medications reviewed:  Medications reconciled to facility chart and changes were made to reflect current medications as identified as above med list. Below are the changes that were made:   Medications stopped since last EPIC medication reconciliation:   Medications Discontinued During This Encounter   Medication Reason     LORazepam (ATIVAN PO)      isradipine (DYNACIRC) 5 MG TB24      guaiFENesin (ROBITUSSIN) 100 MG/5ML SYRP      ipratropium - albuterol 0.5 mg/2.5 mg/3 mL (DUONEB) 0.5-2.5 (3) MG/3ML neb solution        Medications started since last [facility] medication reconciliation:  No orders of the defined types were placed in this encounter.        REVIEW OF SYSTEMS:  4 point ROS including Respiratory, CV, GI and , other than that noted in the HPI,  is negative    Physical Exam:  /77  Pulse 60   Temp 97.3  F (36.3  C)  Resp 20  SpO2 94%  BP Readings from Last 6 Encounters:   07/25/17 149/77   07/23/17 183/81   07/05/17 145/90   06/29/17 131/73   06/26/17 148/76   06/20/17 137/68      GENERAL APPEARANCE:  Alert, in no acute distress  HEAD:  Normal, normocephalic, atraumatic  EYE EXAM: normal external eye, conjunctiva, lids, PAPI  NECK EXAM: supple, no JVD  CHEST/RESP:  respiratory effort normal, lung sounds CTA and diminished, no respiratory distress  CV:  Rate reg, rhythm reg, no murmur, 2+ peripheral edema pitting bilateral LE  M/S:   extremities normal, gait normal-able to with SBA, demonstrates exquisite pain over R trochanter, no pain L hip/troch, no radiation of pain, minimal low back pain, normal muscle tone, and range of motion limited by angelo   NEUROLOGIC EXAM: Normal gross motor movement, tone and coordination. No tremor.  Cranial nerves 2-12 are normal tested and grossly at patient's baseline  PSYCH:  Alert and oriented to person-place-time with mild forgetfulness, affect pleasant , judgement appropriate     Recent Labs:  Reviewed in facility records and Care Everywhere      XR hip and pelvis (as copied from Care Everywhere).   3/26/17  REASON FOR EXAM: Pain.     COMPARISON: 1/3/17.     TECHNIQUE: AP view of the pelvis and two view right hip.      FINDINGS: Left hip arthroplasty changes. Moderate right hip degenerative   change and moderate degenerative change of the pubic symphysis. Surgical   change lower lumbar spine. Sacroiliac joints patent. No fracture.       Assessment/Plan:  Dizziness  Essential hypertension  Hypotension, unspecified hypotension type  Unclear etiology for dizziness and hypotension, but concerning for dehydration. Lasix, isradipine decreased yesterday and she reports less dizziness today. BP within normal limits,, trending a bit high. She is apparently doing well here-eating and drinking well. This could be part of the problem at home, decreased po fluids intake as she  does have urinary frequency.     Generalized muscle weakness  Transfer with SBA, ambulating short distances, really limited by pain. continue therapy and current medical management, progressing     Chronic obstructive pulmonary disease, unspecified COPD type (H)  No cough, no congestion. On symbicort, spiriva. The current medical regimen is effective;  continue present plan and medications.     Osteoarthritis of right hip, unspecified osteoarthritis type  Xray done at [facility] shows degen disease of hip, exquisite tenderness over R trochanter. Will consider steroid injection-suspect trochanteric bursitis.     CKD  Creatinine   Date Value Ref Range Status   07/03/2017 1.43 (H) 0.52 - 1.04 mg/dL Final   ] Avoid nephrotoxic medications, Renal dosing of medications, monitor kidney function this week.        Orders:  1. DC prn tylenol  2. Tylenol 1000 mg po TID pain   3. DC current oxycodone orders  4. Oxycodone 5 mg- give 2.5 mg po Q 4 hr prn pain  5. DC gabapentin 300 mg TID  6. Gabapentin 300 mg po BID Dx neuropathy pain  7. Icy hot with lidocaine or covered alternative (lido 4%/menthol 1%), apply TID to R hip for pain control      Electronically signed by  Kim Hidalgo CNP   [facility]  [phone] cell

## 2017-07-26 ENCOUNTER — APPOINTMENT (OUTPATIENT)
Dept: MRI IMAGING | Facility: CLINIC | Age: 82
DRG: 065 | End: 2017-07-26
Attending: EMERGENCY MEDICINE
Payer: MEDICARE

## 2017-07-26 ENCOUNTER — MEDICAL CORRESPONDENCE (OUTPATIENT)
Dept: HEALTH INFORMATION MANAGEMENT | Facility: CLINIC | Age: 82
End: 2017-07-26

## 2017-07-26 ENCOUNTER — APPOINTMENT (OUTPATIENT)
Dept: GENERAL RADIOLOGY | Facility: CLINIC | Age: 82
DRG: 065 | End: 2017-07-26
Attending: EMERGENCY MEDICINE
Payer: MEDICARE

## 2017-07-26 ENCOUNTER — APPOINTMENT (OUTPATIENT)
Dept: CT IMAGING | Facility: CLINIC | Age: 82
DRG: 065 | End: 2017-07-26
Attending: EMERGENCY MEDICINE
Payer: MEDICARE

## 2017-07-26 ENCOUNTER — HOSPITAL ENCOUNTER (INPATIENT)
Facility: CLINIC | Age: 82
LOS: 2 days | Discharge: NURSING, REHAB OR LONG-TERM CARE | DRG: 065 | End: 2017-07-28
Attending: EMERGENCY MEDICINE | Admitting: FAMILY MEDICINE
Payer: MEDICARE

## 2017-07-26 DIAGNOSIS — R29.810 FACIAL DROOP: ICD-10-CM

## 2017-07-26 DIAGNOSIS — I65.21 STENOSIS OF RIGHT CAROTID ARTERY: Primary | ICD-10-CM

## 2017-07-26 LAB
ALBUMIN UR-MCNC: NEGATIVE MG/DL
ANION GAP SERPL CALCULATED.3IONS-SCNC: 4 MMOL/L (ref 3–14)
APPEARANCE UR: CLEAR
APTT PPP: 30 SEC (ref 22–37)
BASOPHILS # BLD AUTO: 0 10E9/L (ref 0–0.2)
BASOPHILS NFR BLD AUTO: 0.8 %
BILIRUB UR QL STRIP: NEGATIVE
BUN SERPL-MCNC: 39 MG/DL (ref 7–30)
CALCIUM SERPL-MCNC: 8.9 MG/DL (ref 8.5–10.1)
CHLORIDE SERPL-SCNC: 104 MMOL/L (ref 94–109)
CO2 SERPL-SCNC: 29 MMOL/L (ref 20–32)
COLOR UR AUTO: ABNORMAL
CREAT BLD-MCNC: 1.7 MG/DL (ref 0.52–1.04)
CREAT SERPL-MCNC: 1.5 MG/DL (ref 0.52–1.04)
DIFFERENTIAL METHOD BLD: ABNORMAL
EOSINOPHIL # BLD AUTO: 0.2 10E9/L (ref 0–0.7)
EOSINOPHIL NFR BLD AUTO: 3.7 %
ERYTHROCYTE [DISTWIDTH] IN BLOOD BY AUTOMATED COUNT: 14.2 % (ref 10–15)
GFR SERPL CREATININE-BSD FRML MDRD: 29 ML/MIN/1.7M2
GFR SERPL CREATININE-BSD FRML MDRD: 33 ML/MIN/1.7M2
GLUCOSE SERPL-MCNC: 112 MG/DL (ref 70–99)
GLUCOSE UR STRIP-MCNC: NEGATIVE MG/DL
HCT VFR BLD AUTO: 35.6 % (ref 35–47)
HGB BLD-MCNC: 11 G/DL (ref 11.7–15.7)
HGB UR QL STRIP: ABNORMAL
IMM GRANULOCYTES # BLD: 0 10E9/L (ref 0–0.4)
IMM GRANULOCYTES NFR BLD: 0.2 %
INR PPP: 1.07 (ref 0.86–1.14)
KETONES UR STRIP-MCNC: NEGATIVE MG/DL
LACTATE BLD-SCNC: 0.6 MMOL/L (ref 0.7–2.1)
LEUKOCYTE ESTERASE UR QL STRIP: NEGATIVE
LYMPHOCYTES # BLD AUTO: 1 10E9/L (ref 0.8–5.3)
LYMPHOCYTES NFR BLD AUTO: 19.7 %
MCH RBC QN AUTO: 27.6 PG (ref 26.5–33)
MCHC RBC AUTO-ENTMCNC: 30.9 G/DL (ref 31.5–36.5)
MCV RBC AUTO: 89 FL (ref 78–100)
MONOCYTES # BLD AUTO: 0.5 10E9/L (ref 0–1.3)
MONOCYTES NFR BLD AUTO: 9.7 %
NEUTROPHILS # BLD AUTO: 3.2 10E9/L (ref 1.6–8.3)
NEUTROPHILS NFR BLD AUTO: 65.9 %
NITRATE UR QL: NEGATIVE
PH UR STRIP: 7 PH (ref 5–7)
PLATELET # BLD AUTO: 156 10E9/L (ref 150–450)
POTASSIUM SERPL-SCNC: 4 MMOL/L (ref 3.4–5.3)
RBC # BLD AUTO: 3.98 10E12/L (ref 3.8–5.2)
RBC #/AREA URNS AUTO: 5 /HPF (ref 0–2)
SODIUM SERPL-SCNC: 137 MMOL/L (ref 133–144)
SP GR UR STRIP: 1.01 (ref 1–1.03)
TROPONIN I SERPL-MCNC: NORMAL UG/L (ref 0–0.04)
URN SPEC COLLECT METH UR: ABNORMAL
UROBILINOGEN UR STRIP-MCNC: NORMAL MG/DL (ref 0–2)
WBC # BLD AUTO: 4.8 10E9/L (ref 4–11)
WBC #/AREA URNS AUTO: 2 /HPF (ref 0–2)

## 2017-07-26 PROCEDURE — A9270 NON-COVERED ITEM OR SERVICE: HCPCS | Mod: GY | Performed by: EMERGENCY MEDICINE

## 2017-07-26 PROCEDURE — 93005 ELECTROCARDIOGRAM TRACING: CPT

## 2017-07-26 PROCEDURE — 84484 ASSAY OF TROPONIN QUANT: CPT | Performed by: EMERGENCY MEDICINE

## 2017-07-26 PROCEDURE — 85610 PROTHROMBIN TIME: CPT | Performed by: EMERGENCY MEDICINE

## 2017-07-26 PROCEDURE — 93010 ELECTROCARDIOGRAM REPORT: CPT | Performed by: EMERGENCY MEDICINE

## 2017-07-26 PROCEDURE — 85025 COMPLETE CBC W/AUTO DIFF WBC: CPT | Performed by: EMERGENCY MEDICINE

## 2017-07-26 PROCEDURE — 81001 URINALYSIS AUTO W/SCOPE: CPT | Performed by: EMERGENCY MEDICINE

## 2017-07-26 PROCEDURE — 70551 MRI BRAIN STEM W/O DYE: CPT

## 2017-07-26 PROCEDURE — 99285 EMERGENCY DEPT VISIT HI MDM: CPT | Mod: 25 | Performed by: EMERGENCY MEDICINE

## 2017-07-26 PROCEDURE — A9270 NON-COVERED ITEM OR SERVICE: HCPCS | Mod: GY | Performed by: FAMILY MEDICINE

## 2017-07-26 PROCEDURE — 70498 CT ANGIOGRAPHY NECK: CPT | Mod: 52

## 2017-07-26 PROCEDURE — 71020 XR CHEST 2 VW: CPT

## 2017-07-26 PROCEDURE — 96374 THER/PROPH/DIAG INJ IV PUSH: CPT

## 2017-07-26 PROCEDURE — 12000010 ZZH R&B MS INTERMEDIATE OVERFLOW

## 2017-07-26 PROCEDURE — 25000128 H RX IP 250 OP 636: Performed by: EMERGENCY MEDICINE

## 2017-07-26 PROCEDURE — 25000132 ZZH RX MED GY IP 250 OP 250 PS 637: Mod: GY | Performed by: EMERGENCY MEDICINE

## 2017-07-26 PROCEDURE — 99285 EMERGENCY DEPT VISIT HI MDM: CPT | Mod: 25

## 2017-07-26 PROCEDURE — 85730 THROMBOPLASTIN TIME PARTIAL: CPT | Performed by: EMERGENCY MEDICINE

## 2017-07-26 PROCEDURE — 99223 1ST HOSP IP/OBS HIGH 75: CPT | Mod: AI | Performed by: FAMILY MEDICINE

## 2017-07-26 PROCEDURE — 70544 MR ANGIOGRAPHY HEAD W/O DYE: CPT

## 2017-07-26 PROCEDURE — 25000132 ZZH RX MED GY IP 250 OP 250 PS 637: Mod: GY | Performed by: FAMILY MEDICINE

## 2017-07-26 PROCEDURE — 80048 BASIC METABOLIC PNL TOTAL CA: CPT | Performed by: EMERGENCY MEDICINE

## 2017-07-26 PROCEDURE — 25000128 H RX IP 250 OP 636

## 2017-07-26 PROCEDURE — 70547 MR ANGIOGRAPHY NECK W/O DYE: CPT

## 2017-07-26 PROCEDURE — 82565 ASSAY OF CREATININE: CPT

## 2017-07-26 PROCEDURE — 70450 CT HEAD/BRAIN W/O DYE: CPT

## 2017-07-26 PROCEDURE — 96361 HYDRATE IV INFUSION ADD-ON: CPT

## 2017-07-26 PROCEDURE — 83605 ASSAY OF LACTIC ACID: CPT | Performed by: EMERGENCY MEDICINE

## 2017-07-26 RX ORDER — ISOSORBIDE MONONITRATE 30 MG/1
30 TABLET, EXTENDED RELEASE ORAL DAILY
Status: DISCONTINUED | OUTPATIENT
Start: 2017-07-27 | End: 2017-07-28 | Stop reason: HOSPADM

## 2017-07-26 RX ORDER — POTASSIUM CHLORIDE 1.5 G/1.58G
10 POWDER, FOR SOLUTION ORAL DAILY
Status: DISCONTINUED | OUTPATIENT
Start: 2017-07-27 | End: 2017-07-28 | Stop reason: HOSPADM

## 2017-07-26 RX ORDER — TRIMETHOPRIM 100 MG/1
100 TABLET ORAL DAILY
Status: DISCONTINUED | OUTPATIENT
Start: 2017-07-27 | End: 2017-07-28 | Stop reason: HOSPADM

## 2017-07-26 RX ORDER — IOPAMIDOL 755 MG/ML
70 INJECTION, SOLUTION INTRAVASCULAR ONCE
Status: COMPLETED | OUTPATIENT
Start: 2017-07-26 | End: 2017-07-26

## 2017-07-26 RX ORDER — ACETAMINOPHEN 500 MG
1000 TABLET ORAL 3 TIMES DAILY
Status: DISCONTINUED | OUTPATIENT
Start: 2017-07-26 | End: 2017-07-28 | Stop reason: HOSPADM

## 2017-07-26 RX ORDER — ATORVASTATIN CALCIUM 20 MG/1
40 TABLET, FILM COATED ORAL DAILY
Status: DISCONTINUED | OUTPATIENT
Start: 2017-07-26 | End: 2017-07-28 | Stop reason: HOSPADM

## 2017-07-26 RX ORDER — ACETAMINOPHEN 325 MG/1
650 TABLET ORAL ONCE
Status: COMPLETED | OUTPATIENT
Start: 2017-07-26 | End: 2017-07-26

## 2017-07-26 RX ORDER — ONDANSETRON 2 MG/ML
INJECTION INTRAMUSCULAR; INTRAVENOUS
Status: COMPLETED
Start: 2017-07-26 | End: 2017-07-26

## 2017-07-26 RX ORDER — ISOSORBIDE MONONITRATE 30 MG/1
30 TABLET, EXTENDED RELEASE ORAL DAILY
COMMUNITY
End: 2017-11-27

## 2017-07-26 RX ORDER — SODIUM CHLORIDE 9 MG/ML
1000 INJECTION, SOLUTION INTRAVENOUS CONTINUOUS
Status: DISCONTINUED | OUTPATIENT
Start: 2017-07-26 | End: 2017-07-26

## 2017-07-26 RX ORDER — NALOXONE HYDROCHLORIDE 0.4 MG/ML
.1-.4 INJECTION, SOLUTION INTRAMUSCULAR; INTRAVENOUS; SUBCUTANEOUS
Status: DISCONTINUED | OUTPATIENT
Start: 2017-07-26 | End: 2017-07-28 | Stop reason: HOSPADM

## 2017-07-26 RX ORDER — ONDANSETRON 2 MG/ML
4 INJECTION INTRAMUSCULAR; INTRAVENOUS ONCE
Status: COMPLETED | OUTPATIENT
Start: 2017-07-26 | End: 2017-07-26

## 2017-07-26 RX ORDER — UREA 10 %
1000 LOTION (ML) TOPICAL DAILY
Status: DISCONTINUED | OUTPATIENT
Start: 2017-07-27 | End: 2017-07-28 | Stop reason: HOSPADM

## 2017-07-26 RX ORDER — MEMANTINE HYDROCHLORIDE 5 MG/1
5 TABLET ORAL DAILY
Status: DISCONTINUED | OUTPATIENT
Start: 2017-07-27 | End: 2017-07-28 | Stop reason: HOSPADM

## 2017-07-26 RX ORDER — IOPAMIDOL 755 MG/ML
70 INJECTION, SOLUTION INTRAVASCULAR ONCE
Status: DISCONTINUED | OUTPATIENT
Start: 2017-07-26 | End: 2017-07-26

## 2017-07-26 RX ORDER — FUROSEMIDE 20 MG
20 TABLET ORAL 2 TIMES DAILY
Status: DISCONTINUED | OUTPATIENT
Start: 2017-07-26 | End: 2017-07-28 | Stop reason: HOSPADM

## 2017-07-26 RX ORDER — GABAPENTIN 300 MG/1
300 CAPSULE ORAL 2 TIMES DAILY
Status: DISCONTINUED | OUTPATIENT
Start: 2017-07-26 | End: 2017-07-28 | Stop reason: HOSPADM

## 2017-07-26 RX ORDER — ONDANSETRON 2 MG/ML
4 INJECTION INTRAMUSCULAR; INTRAVENOUS EVERY 6 HOURS PRN
Status: DISCONTINUED | OUTPATIENT
Start: 2017-07-26 | End: 2017-07-28 | Stop reason: HOSPADM

## 2017-07-26 RX ORDER — CITALOPRAM HYDROBROMIDE 20 MG/1
20 TABLET ORAL DAILY
Status: DISCONTINUED | OUTPATIENT
Start: 2017-07-27 | End: 2017-07-28 | Stop reason: HOSPADM

## 2017-07-26 RX ORDER — ONDANSETRON 4 MG/1
4 TABLET, ORALLY DISINTEGRATING ORAL EVERY 6 HOURS PRN
Status: DISCONTINUED | OUTPATIENT
Start: 2017-07-26 | End: 2017-07-28 | Stop reason: HOSPADM

## 2017-07-26 RX ORDER — DOCUSATE SODIUM 100 MG/1
100 CAPSULE, LIQUID FILLED ORAL DAILY
Status: DISCONTINUED | OUTPATIENT
Start: 2017-07-26 | End: 2017-07-28 | Stop reason: HOSPADM

## 2017-07-26 RX ORDER — CLOPIDOGREL BISULFATE 75 MG/1
75 TABLET ORAL DAILY
Status: DISCONTINUED | OUTPATIENT
Start: 2017-07-26 | End: 2017-07-28 | Stop reason: HOSPADM

## 2017-07-26 RX ADMIN — DOCUSATE SODIUM 100 MG: 100 CAPSULE, LIQUID FILLED ORAL at 20:02

## 2017-07-26 RX ADMIN — FLUTICASONE FUROATE AND VILANTEROL TRIFENATATE 1 PUFF: 100; 25 POWDER RESPIRATORY (INHALATION) at 20:03

## 2017-07-26 RX ADMIN — ACETAMINOPHEN 650 MG: 325 TABLET, FILM COATED ORAL at 15:06

## 2017-07-26 RX ADMIN — IOPAMIDOL 35 ML: 755 INJECTION, SOLUTION INTRAVENOUS at 14:39

## 2017-07-26 RX ADMIN — ONDANSETRON 4 MG: 2 INJECTION INTRAMUSCULAR; INTRAVENOUS at 15:15

## 2017-07-26 RX ADMIN — CLOPIDOGREL 75 MG: 75 TABLET, FILM COATED ORAL at 20:02

## 2017-07-26 RX ADMIN — SODIUM CHLORIDE 500 ML: 9 INJECTION, SOLUTION INTRAVENOUS at 12:10

## 2017-07-26 RX ADMIN — CALCIUM CARBONATE-VITAMIN D TAB 500 MG-200 UNIT 1 TABLET: 500-200 TAB at 20:01

## 2017-07-26 RX ADMIN — ATORVASTATIN CALCIUM 40 MG: 20 TABLET, FILM COATED ORAL at 20:01

## 2017-07-26 RX ADMIN — ACETAMINOPHEN 1000 MG: 500 TABLET ORAL at 20:00

## 2017-07-26 RX ADMIN — FUROSEMIDE 20 MG: 20 TABLET ORAL at 20:03

## 2017-07-26 RX ADMIN — GABAPENTIN 300 MG: 300 CAPSULE ORAL at 20:03

## 2017-07-26 ASSESSMENT — ENCOUNTER SYMPTOMS
WEAKNESS: 0
SPEECH DIFFICULTY: 1

## 2017-07-26 NOTE — IP AVS SNAPSHOT
"` `           [facility] INTENSIVE CARE: [phone]                                              INTERAGENCY TRANSFER FORM - NURSING   2017                    Hospital Admission Date: 2017  VICKIE REID   : [id number]  Sex: Female        Attending Provider: Kenton Blackwood MD     Allergies:  Accupril, Ace Inhibitors, Augmentin, Levofloxacin, Morphine, Norvasc [Amlodipine Besylate]    Infection:  None   Service:  HOSPITALIST    Ht:  1.6 m (5' 3\")   Wt:  86.4 kg (190 lb 7.6 oz)   Admission Wt:  86.5 kg (190 lb 11.2 oz)    BMI:  33.74 kg/m 2   BSA:  1.96 m 2            Patient PCP Information     Provider PCP Type    Mirian Aguilera MD General      Current Code Status     Date Active Code Status Order ID Comments User Context       2017  5:55 PM Full Code [id number]  Shamar Orellana MD Inpatient       Code Status History     Date Active Date Inactive Code Status Order ID Comments User Context    This patient has a current code status but no historical code status.      Advance Directives        Does patient have a scanned Advance Directive/ACP document in EPIC?           Yes        Hospital Problems as of 2017              Priority Class Noted POA    CVA (cerebral vascular accident) (H) Medium  2017 Yes      Non-Hospital Problems as of 2017              Priority Class Noted    Abdominal pain, generalized Medium  3/15/2006    Atherosclerosis of renal artery (H) Medium  Unknown    Gastrointestinal malfunction arising from mental factors Medium  Unknown    Other chest pain Medium  Unknown    Esophageal reflux Medium  Unknown    Insomnia Medium  Unknown    Cerebral aneurysm, nonruptured Medium  Unknown    Cardiac dysrhythmias NEC Medium  Unknown    Essential hypertension, benign Medium  2006    Congenital cystic kidney disease Medium  2006    Benign neoplasm of colon Medium  10/4/2006    Renovascular hypertension Medium  2006    Backache Medium  10/3/2007    " Epistaxis Medium  10/3/2007    Temporomandibular joint disorder Medium  2/11/2008    CHF (congestive heart failure) (H) Medium  9/17/2008    Restrictive lung disease Medium  9/17/2008    CARDIOVASCULAR SCREENING; LDL GOAL LESS THAN 100 Medium  10/31/2010    Osteoporosis Medium  10/25/2011    S/P laminectomy Medium  10/25/2011    Hip joint replacement status Medium  4/18/2012    Osteoarthritis Medium  4/18/2012    DDD (degenerative disc disease), lumbar Medium  4/18/2012    Mild major depression (H) Medium  4/18/2012    Incontinence of urine Medium  4/18/2012    Hypokalemia Medium  5/15/2012    Essential hypertension Medium  6/20/2017    Urinary tract infection without hematuria, site unspecified Medium  6/20/2017    Chronic obstructive pulmonary disease, unspecified COPD type (H) Medium  7/25/2017    Generalized muscle weakness Medium  7/25/2017    Dizziness Medium  7/25/2017    Osteoarthritis of right hip, unspecified osteoarthritis type Medium  7/25/2017    Hypotension, unspecified hypotension type Medium  7/25/2017      Immunizations     Name Date      Influenza (IIV3) 11/13/07     Influenza (IIV3) 11/09/06     Influenza (IIV3) 12/15/00     Influenza (IIV3) 10/26/99     Influenza (IIV3) 11/10/97          END      ASSESSMENT     Discharge Profile Flowsheet     EXPECTED DISCHARGE     GI WDL  WDL 07/28/17 1335    Expected Discharge Date  07/28/17 07/27/17 1711   Last Bowel Movement  07/28/17 07/28/17 0133    DISCHARGE NEEDS ASSESSMENT     Passing flatus  yes 07/27/17 0934    Patient/family verbalizes understanding of discharge plan recommendations?  Yes 07/27/17 1711   COMMUNICATION ASSESSMENT      Medical Team notified of plan?  yes 07/27/17 1711   Patient's communication style  spoken language (English or Bilingual) 07/26/17 1121    Readmission Within The Last 30 Days  no previous admission in last 30 days 07/27/17 1713   FINAL RESOURCES      Transportation Available  family or friend will provide 07/27/17 1713    "F/U Appointment Brochure Provided  07/27/17 (geriatric services to see) 07/27/17 1713    Current Discharge Risk  physical impairment 07/27/17 1713   SKIN      # of Referrals Placed by CTS  MTM;Post Acute Facilities 07/27/17 1713   Inspection of bony prominences  Full 07/28/17 1335    Key Recommendations  Return to TCU and continue physical therapy 07/27/17 1713   Skin WDL  WDL 07/28/17 1335    Does Patient Need a Referral for Clinic CC  No 07/27/17 1713   SAFETY      Confirm patient is eligible for [employer] telemonitoring  No - b/c of DX 07/27/17 1713   Safety WDL  WDL 07/28/17 1335    GASTROINTESTINAL (ADULT,PEDIATRIC,OB)                        Assessment WDL (Within Defined Limits) Definitions           Safety WDL     Effective: 09/28/15    Row Information: <b>WDL Definition:</b> Bed in low position, wheels locked; call light in reach; upper side rails up x 2; ID band on<br> <font color=\"gray\"><i>Item=AS safety wdl>>List=AS safety wdl>>Version=F14</i></font>      Skin WDL     Effective: 09/28/15    Row Information: <b>WDL Definition:</b> Warm; dry; intact; elastic; without discoloration; pressure points without redness<br> <font color=\"gray\"><i>Item=AS skin wdl>>List=AS skin wdl>>Version=F14</i></font>      Vitals     Vital Signs Flowsheet     VITAL SIGNS     Body Position  independently positioning 07/28/17 1330    Temp  98  F (36.7  C) 07/28/17 1009   Head of Bed (HOB)  HOB at 30-45 degrees 07/28/17 1335    Temp src  Oral 07/28/17 1009   Chair  Upright in chair 07/28/17 0841    Resp  16 07/28/17 0805   DAILY CARE      Pulse  56 07/27/17 1609   Activity Type  activity adjusted per tolerance 07/28/17 1330    Heart Rate  56 07/28/17 0805   Activity Level of Assistance  assistance, 1 person 07/28/17 0841    Pulse/Heart Rate Source  Monitor 07/27/17 2340   Activity Assistive Device  walker 07/28/17 0841    BP  (!)  193/97 07/28/17 0805   ECG      BP Location  Right arm 07/28/17 0732   ECG Rhythm  Sinus bradycardia " "07/28/17 0122    OXYGEN THERAPY     Ectopy  None 07/28/17 0122    SpO2  96 % 07/28/17 0805   SC Interval  .17 07/28/17 0122    O2 Device  None (Room air) 07/28/17 0805   QRS Interval  .08 07/28/17 0122    Oxygen Delivery  1.5 LPM 07/27/17 1208   QT Interval  .44 07/28/17 0122    PAIN/COMFORT     Lead Monitored  Lead II 07/28/17 0122    Patient Currently in Pain  sleeping: patient not able to self report 07/28/17 1330   RESPIRATORY MONITORING      0-10 Pain Scale  3 07/26/17 1506   Respiratory Monitoring (EtCO2)  98 mmHg 07/26/17 1152    CLINICALLY ALIGNED PAIN ASSESSMENT (CAPA) ([facility], [facility] AND [facility] ADULTS ONLY)     EKG MONITORING      Comfort  comfortably manageable 07/28/17 1330   Cardiac Regularity  Regular 07/26/17 1152    HEIGHT AND WEIGHT     Cardiac Rhythm  NSR 07/26/17 1152    Height  1.6 m (5' 3\") 07/26/17 1907   MARTA COMA SCALE      Weight  86.4 kg (190 lb 7.6 oz) 07/28/17 0543   Best Eye Response  4-->(E4) spontaneous 07/28/17 0133    BSA (Calculated - sq m)  1.96 07/26/17 1907   Best Motor Response  6-->(M6) obeys commands 07/28/17 0133    BMI (Calculated)  33.85 07/26/17 1907   Best Verbal Response  5-->(V5) oriented 07/28/17 0133    POSITIONING     [place] Coma Scale Score  15 07/28/17 0133            Patient Lines/Drains/Airways Status    Active LINES/DRAINS/AIRWAYS     Name: Placement date: Placement time: Site: Days: Last dressing change:    Peripheral IV 07/26/17 Left Upper forearm 07/26/17      Upper forearm   2             Patient Lines/Drains/Airways Status    Active PICC/CVC     None            Intake/Output Detail Report     Date Intake     Output Net    Shift P.O. I.V. IV Piggyback Total Urine Total       Day 07/27/17 0700 - 07/27/17 1459 350 -- -- 350 300 300 50    [facility] 07/27/17 1500 - 07/27/17 2259 120 -- -- 120 250 250 -130    [facility] 07/27/17 2300 - 07/28/17 0659 -- 70.8 -- 70.8 400 400 -329.2    Day 07/28/17 0700 - 07/28/17 1459 360 137.3 -- 497.3 400 400 97.3    [facility] 07/28/17 1500 - " 07/28/17 2259 -- -- -- -- -- -- 0      Last Void/BM       Most Recent Value    Urine Occurrence 1 at 07/28/2017 0839    Stool Occurrence 1 at 07/28/2017 0110      Case Management/Discharge Planning     Case Management/Discharge Planning Flowsheet     REFERRAL INFORMATION     Expected Discharge Date  07/28/17 07/27/17 1711    Admission Type  inpatient 07/27/17 1709   DISCHARGE PLANNING      Arrived From  nursing facility 07/27/17 1709   Patient/family verbalizes understanding of discharge plan recommendations?  Yes 07/27/17 1711    Referral Source  high risk screening 07/27/17 1709   Medical Team notified of plan?  yes 07/27/17 1711    New Steerage to  Clinics?  No 07/27/17 1709   Readmission Within The Last 30 Days  no previous admission in last 30 days 07/27/17 1713    # of Referrals Placed by CTS  MTM;Post Acute Facilities 07/27/17 1713   Transportation Available  family or friend will provide 07/27/17 1713    Record Reviewed  clinical discipline documentation;history and physical;medical record;patient profile;plan of care 07/27/17 1709   Current Discharge Risk  physical impairment 07/27/17 1713    Primary Care Clinic Name   Geriatrics 07/27/17 1711   Key Recommendations  Return to TCU and continue physical therapy 07/27/17 1713    LIVING ENVIRONMENT     Does Patient Need a Referral for Clinic CC  No 07/27/17 1713    Lives With  facility resident 07/27/17 1711   FINAL RESOURCES      Living Arrangements  extended care facility 07/27/17 1711   F/U Appointment Brochure Provided  07/27/17 (geriatric services to see) 07/27/17 1713    Provides Primary Care For  no one, unable/limited ability to care for self 07/27/17 1711   ABUSE RISK SCREEN      Quality Of Family Relationships  unable to assess 07/27/17 1711   QUESTION TO PATIENT:  Has a member of your family or a partner(now or in the past) intimidated, hurt, manipulated, or controlled you in any way?  no 07/26/17 1824    Able to Return to Prior Living  Arrangements  yes 07/27/17 1711   QUESTION TO PATIENT: Do you feel safe going back to the place where you are living?  yes 07/26/17 1824    Living Arrangement Comments  [facility]a VAL[facility]. Has bed hold. 07/27/17 1711   OBSERVATION: Is there reason to believe there has been maltreatment of a vulnerable adult (ie. Physical/Sexual/Emotional abuse, self neglect, lack of adequate food, shelter, medical care, or financial exploitation)?  no 07/26/17 1824    COPING/STRESS     (R) MENTAL HEALTH SUICIDE RISK      Major Change/Loss/Stressor  none 07/26/17 1829   Are you depressed or being treated for depression?  No 07/26/17 1828    EXPECTED DISCHARGE

## 2017-07-26 NOTE — IP AVS SNAPSHOT
"          [facility] INTENSIVE CARE: [phone]                                              INTERAGENCY TRANSFER FORM - LAB / IMAGING / EKG / EMG RESULTS   2017                    Hospital Admission Date: 2017  VICKIE REID   : [id number]  Sex: Female        Attending Provider: Kenton Blackwood MD     Allergies:  Accupril, Ace Inhibitors, Augmentin, Levofloxacin, Morphine, Norvasc [Amlodipine Besylate]    Infection:  None   Service:  HOSPITALIST    Ht:  1.6 m (5' 3\")   Wt:  86.4 kg (190 lb 7.6 oz)   Admission Wt:  86.5 kg (190 lb 11.2 oz)    BMI:  33.74 kg/m 2   BSA:  1.96 m 2            Patient PCP Information     Provider PCP Type    Mirian Aguilera MD General         Lab Results - 3 Days      Anti 10A [[id number]]  Resulted: 17 1218, Result status: In process    Ordering provider: Kenton Blackwood MD  17 0824 Resulting lab: [facility]    Specimen Information    Type Source Collected On   Blood  17 1215            Anti 10A [[id number]] (Abnormal)  Resulted: 17 0812, Result status: Final result    Ordering provider: Kenton Blackwood MD  17 0545 Resulting lab: [facility]    Specimen Information    Type Source Collected On   Blood  17 0600          Components       Value Reference Range Flag Lab   Heparin 10A Level 1.04 IU/mL  59   Comment:         Therapeutic Range:     UFH:   0.15-0.35 IU/mL for low              intensity dosing            0.30-0.70 IU/mL for high              intensity dosing for              DVT and PE     Enoxaparin: If administered              once daily with dose              1.5mg/k.0-2.0 IU/mL                 If administered              twice daily with dose              1mg/k.60-1.0 IU/mL   Critical Value:   Critical value if patient is receiving   unfractionated heparin: >1 IU/mL, critical   range does not apply if patient is receiving   low molecular weight heparin.   Critical Value called to and read " back by  KAREN CLEARY BY RP 07/28/17 0815.              Basic metabolic panel [[id number]] (Abnormal)  Resulted: 07/28/17 0647, Result status: Final result    Ordering provider: Kenton Blackwood MD  07/28/17 0000 Resulting lab: [facility]    Specimen Information    Type Source Collected On   Blood  07/28/17 0600          Components       Value Reference Range Flag Lab   Sodium 140 133 - 144 mmol/L  59   Potassium 4.2 3.4 - 5.3 mmol/L  59   Chloride 106 94 - 109 mmol/L  59   Carbon Dioxide 29 20 - 32 mmol/L  59   Anion Gap 5 3 - 14 mmol/L  59   Glucose 96 70 - 99 mg/dL  59   Urea Nitrogen 33 7 - 30 mg/dL H 59   Creatinine 1.38 0.52 - 1.04 mg/dL H 59   GFR Estimate 36 >60 mL/min/1.7m2 L 59   Comment:  Non  GFR Calc   GFR Estimate If Black 44 >60 mL/min/1.7m2 L 59   Comment:  African American GFR Calc   Calcium 9.3 8.5 - 10.1 mg/dL  59            CBC with platelets differential [[id number]] (Abnormal)  Resulted: 07/28/17 0635, Result status: Final result    Ordering provider: Kenton Blackwood MD  07/28/17 0000 Resulting lab: [facility]    Specimen Information    Type Source Collected On   Blood  07/28/17 0600          Components       Value Reference Range Flag Lab   WBC 4.6 4.0 - 11.0 10e9/L  59   RBC Count 3.73 3.8 - 5.2 10e12/L L 59   Hemoglobin 10.3 11.7 - 15.7 g/dL L 59   Hematocrit 33.2 35.0 - 47.0 % L 59   MCV 89 78 - 100 fl  59   MCH 27.6 26.5 - 33.0 pg  59   MCHC 31.0 31.5 - 36.5 g/dL L 59   RDW 14.0 10.0 - 15.0 %  59   Platelet Count 144 150 - 450 10e9/L L 59   Diff Method Automated Method   59   % Neutrophils 55.5 %  59   % Lymphocytes 27.6 %  59   % Monocytes 9.1 %  59   % Eosinophils 6.7 %  59   % Basophils 0.9 %  59   % Immature Granulocytes 0.2 %  59   Absolute Neutrophil 2.6 1.6 - 8.3 10e9/L  59   Absolute Lymphocytes 1.3 0.8 - 5.3 10e9/L  59   Absolute Monocytes 0.4 0.0 - 1.3 10e9/L  59   Absolute Eosinophils 0.3 0.0 - 0.7 10e9/L  59   Absolute Basophils  0.0 0.0 - 0.2 10e9/L  59   Abs Immature Granulocytes 0.0 0 - 0.4 10e9/L  59            Heparin 10a Level [[id number]]  Resulted: 17 2339, Result status: Final result    Ordering provider: Kenton Blackwood MD  17 1659 Resulting lab: [facility]    Specimen Information    Type Source Collected On   Blood  17 2320          Components       Value Reference Range Flag Lab   Heparin 10A Level 0.63 IU/mL  59   Comment:         Therapeutic Range:     UFH:   0.15-0.35 IU/mL for low              intensity dosing            0.30-0.70 IU/mL for high              intensity dosing for              DVT and PE     Enoxaparin: If administered              once daily with dose              1.5mg/k.0-2.0 IU/mL                 If administered              twice daily with dose              1mg/k.60-1.0 IU/mL              Methicillin Resistant Staph Aureus PCR [[id number]]  Resulted: 17 1423, Result status: Final result    Ordering provider: Shamar Orellana MD  17 0114 Resulting lab: [facility]    Specimen Information    Type Source Collected On   Nares  17 0120          Components       Value Reference Range Flag Lab   Specimen Description Nares   59   S Aur Meth Resis PCR -- NEG  75   Result:         Negative  MRSA Negative: SA Negative  MRSA and Staphylococcus aureus target DNA not   detected, presumed negative for MRSA and SA colonization or the number of   bacteria present may be below the limit of detection for the assay. FDA   approved assay performed using [employer] GeneXpert(R) real-time PCR.              D dimer quantitative [[id number]] (Abnormal)  Resulted: 17 1404, Result status: Final result    Ordering provider: Kenton Blackwood MD  17 1243 Resulting lab: [facility]    Specimen Information    Type Source Collected On   Blood  17 1305          Components       Value Reference Range Flag Lab   D  Dimer 1.4 0.0 - 0.50 ug/ml FEU H 59   Comment:         This D-dimer assay is intended for use in conjunction with a clinical pretest   probability assessment model to exclude pulmonary embolism (PE) and deep   venous   thrombosis (DVT) in outpatients suspected of PE or DVT. The cut-off value is   0.5 ug/mL FEU.              Blood gas venous [[id number]] (Abnormal)  Resulted: 07/27/17 1343, Result status: Final result    Ordering provider: Kenton Blackwood MD  07/27/17 1243 Resulting lab: [facility]    Specimen Information    Type Source Collected On   Blood  07/27/17 1305          Components       Value Reference Range Flag Lab   Ph Venous 7.33 7.32 - 7.43 pH  59   PCO2 Venous 56 40 - 50 mm Hg H 59   PO2 Venous 31 25 - 47 mm Hg  59   Bicarbonate Venous 29 21 - 28 mmol/L H 59   Base Excess Venous 2.7 mmol/L  59   Comment:  Abnormal Result, Ref range: -7.7 to 1.9            Basic metabolic panel [[id number]] (Abnormal)  Resulted: 07/27/17 0659, Result status: Final result    Ordering provider: Shamar Orellana MD  07/27/17 0001 Resulting lab: [facility]    Specimen Information    Type Source Collected On   Blood  07/27/17 0610          Components       Value Reference Range Flag Lab   Sodium 142 133 - 144 mmol/L  59   Potassium 4.5 3.4 - 5.3 mmol/L  59   Chloride 107 94 - 109 mmol/L  59   Carbon Dioxide 31 20 - 32 mmol/L  59   Anion Gap 4 3 - 14 mmol/L  59   Glucose 87 70 - 99 mg/dL  59   Urea Nitrogen 34 7 - 30 mg/dL H 59   Creatinine 1.48 0.52 - 1.04 mg/dL H 59   GFR Estimate 34 >60 mL/min/1.7m2 L 59   Comment:  Non  GFR Calc   GFR Estimate If Black 41 >60 mL/min/1.7m2 L 59   Comment:  African American GFR Calc   Calcium 9.1 8.5 - 10.1 mg/dL  59            CBC with platelets [[id number]] (Abnormal)  Resulted: 07/27/17 0645, Result status: Final result    Ordering provider: Shamar Orellana MD  07/27/17 0001 Resulting lab: [facility]     Specimen Information    Type Source Collected On   Blood  07/27/17 0610          Components       Value Reference Range Flag Lab   WBC 3.8 4.0 - 11.0 10e9/L L 59   RBC Count 3.86 3.8 - 5.2 10e12/L  59   Hemoglobin 10.7 11.7 - 15.7 g/dL L 59   Hematocrit 34.6 35.0 - 47.0 % L 59   MCV 90 78 - 100 fl  59   MCH 27.7 26.5 - 33.0 pg  59   MCHC 30.9 31.5 - 36.5 g/dL L 59   RDW 14.2 10.0 - 15.0 %  59   Platelet Count 147 150 - 450 10e9/L L 59            Lactic acid whole blood [[id number]] (Abnormal)  Resulted: 07/26/17 1604, Result status: Final result    Ordering provider: Robert Borjas MD  07/26/17 1537 Resulting lab: [facility]    Specimen Information    Type Source Collected On   Blood  07/26/17 1546          Components       Value Reference Range Flag Lab   Lactic Acid 0.6 0.7 - 2.1 mmol/L L 59            UA reflex to Microscopic [[id number]] (Abnormal)  Resulted: 07/26/17 1540, Result status: Final result    Ordering provider: Robert Borjas MD  07/26/17 1444 Resulting lab: [facility]    Specimen Information    Type Source Collected On   Urine Urine catheter 07/26/17 1515          Components       Value Reference Range Flag Lab   Color Urine Light Yellow   59   Appearance Urine Clear   59   Glucose Urine Negative NEG mg/dL  59   Bilirubin Urine Negative NEG  59   Ketones Urine Negative NEG mg/dL  59   Specific Gravity Urine 1.008 1.003 - 1.035  59   Blood Urine Trace NEG A 59   pH Urine 7.0 5.0 - 7.0 pH  59   Protein Albumin Urine Negative NEG mg/dL  59   Urobilinogen mg/dL Normal 0.0 - 2.0 mg/dL  59   Nitrite Urine Negative NEG  59   Leukocyte Esterase Urine Negative NEG  59   Source Catheterized Urine   59   RBC Urine 5 0 - 2 /HPF H 59   WBC Urine 2 0 - 2 /HPF  59            Creatinine POCT [[id number]] (Abnormal)  Resulted: 07/26/17 1211, Result status: Final result    Ordering provider: Robert Borjas MD  07/26/17 1201 Resulting lab: POINT OF CARE TEST,  HANDHELD METER    Specimen Information    Type Source Collected On     07/26/17 1201          Components       Value Reference Range Flag Lab   Creatinine 1.7 0.52 - 1.04 mg/dL H 171   GFR Estimate 29 >60 mL/min/1.7m2 L 171   GFR Estimate If Black 35 >60 mL/min/1.7m2 L 171            Basic metabolic panel [[id number]] (Abnormal)  Resulted: 07/26/17 1202, Result status: Final result    Ordering provider: Robert Borjas MD  07/26/17 1135 Resulting lab: [facility]    Specimen Information    Type Source Collected On   Blood  07/26/17 1130          Components       Value Reference Range Flag Lab   Sodium 137 133 - 144 mmol/L  59   Potassium 4.0 3.4 - 5.3 mmol/L  59   Chloride 104 94 - 109 mmol/L  59   Carbon Dioxide 29 20 - 32 mmol/L  59   Anion Gap 4 3 - 14 mmol/L  59   Glucose 112 70 - 99 mg/dL H 59   Urea Nitrogen 39 7 - 30 mg/dL H 59   Creatinine 1.50 0.52 - 1.04 mg/dL H 59   GFR Estimate 33 >60 mL/min/1.7m2 L 59   Comment:  Non  GFR Calc   GFR Estimate If Black 40 >60 mL/min/1.7m2 L 59   Comment:  African American GFR Calc   Calcium 8.9 8.5 - 10.1 mg/dL  59            Troponin I [[id number]]  Resulted: 07/26/17 1202, Result status: Final result    Ordering provider: Robert Borjas MD  07/26/17 1135 Resulting lab: [facility]    Specimen Information    Type Source Collected On   Blood  07/26/17 1130          Components       Value Reference Range Flag Lab   Troponin I ES -- 0.000 - 0.045 ug/L  59   Result:         <0.015  The 99th percentile for upper reference range is 0.045 ug/L.  Troponin values in   the range of 0.045 - 0.120 ug/L may be associated with risks of adverse   clinical events.              Partial thromboplastin time [[id number]]  Resulted: 07/26/17 1156, Result status: Final result    Ordering provider: Robert Borjas MD  07/26/17 1135 Resulting lab: [facility]    Specimen Information    Type Source Collected On    Blood  07/26/17 1130          Components       Value Reference Range Flag Lab   PTT 30 22 - 37 sec  59            INR [[id number]]  Resulted: 07/26/17 1156, Result status: Final result    Ordering provider: oRbert Borjas MD  07/26/17 1135 Resulting lab: [facility]    Specimen Information    Type Source Collected On   Blood  07/26/17 1130          Components       Value Reference Range Flag Lab   INR 1.07 0.86 - 1.14  59            CBC with platelets differential [[id number]] (Abnormal)  Resulted: 07/26/17 1152, Result status: Final result    Ordering provider: Robert Borjas MD  07/26/17 1135 Resulting lab: [facility]    Specimen Information    Type Source Collected On   Blood  07/26/17 1130          Components       Value Reference Range Flag Lab   WBC 4.8 4.0 - 11.0 10e9/L  59   RBC Count 3.98 3.8 - 5.2 10e12/L  59   Hemoglobin 11.0 11.7 - 15.7 g/dL L 59   Hematocrit 35.6 35.0 - 47.0 %  59   MCV 89 78 - 100 fl  59   MCH 27.6 26.5 - 33.0 pg  59   MCHC 30.9 31.5 - 36.5 g/dL L 59   RDW 14.2 10.0 - 15.0 %  59   Platelet Count 156 150 - 450 10e9/L  59   Diff Method Automated Method   59   % Neutrophils 65.9 %  59   % Lymphocytes 19.7 %  59   % Monocytes 9.7 %  59   % Eosinophils 3.7 %  59   % Basophils 0.8 %  59   % Immature Granulocytes 0.2 %  59   Absolute Neutrophil 3.2 1.6 - 8.3 10e9/L  59   Absolute Lymphocytes 1.0 0.8 - 5.3 10e9/L  59   Absolute Monocytes 0.5 0.0 - 1.3 10e9/L  59   Absolute Eosinophils 0.2 0.0 - 0.7 10e9/L  59   Absolute Basophils 0.0 0.0 - 0.2 10e9/L  59   Abs Immature Granulocytes 0.0 0 - 0.4 10e9/L  59            Testing Performed By     Lab - Abbreviation Name Director Address Valid Date Range    45 - [id number] [facility] Unknown Unknown 01/28/02 0000 - Present    59 - Unknown [facility] Unknown [address] [facility] [address] 12/31/14 1006 - Present    75 - Unknown [facility] Unknown [address]  [facility] [id number] 01/15/15 1019 - Present    171 - Unknown POINT OF CARE TEST, HANDHELD METER Unknown Unknown 10/31/11 1113 - Present            Unresulted Labs (24h ago through future)    Start       Ordered    07/28/17 0600  CBC with platelets differential  EVERY THREE DAYS,   Routine     Comments:  Last Lab Result: Hemoglobin (g/dL)       Date                     Value                 07/27/2017               10.7 (L)         ----------    07/27/17 1848         Imaging Results - 3 Days      US Carotid Bilateral Portable [[id number]]  Resulted: 07/27/17 2041, Result status: Final result    Ordering provider: Kenton Blackwood MD  07/27/17 1242 Resulted by: Mark Schwarz MD    Performed: 07/27/17 1640 - 07/27/17 1754 Resulting lab: RADIOLOGY RESULTS    Narrative:       US CAROTID BILATERAL PORTABLE   7/27/2017 5:54 PM     HISTORY:CVA, inadequate visualization on MRA    COMPARISON: None.    RIGHT CAROTID FINDINGS:  There is extensive atherosclerotic plaque in  the carotid bifurcation.     Right ICA PSV:  273  cm/sec.  Right ICA EDV:  25 cm/sec.  Right ICA/CCA PSV Ratio:  3.95      These indicate greater than 70% diameter stenosis of the right ICA  relative to the distal ICA.      Right Vertebral: Antegrade flow.   Right ECA: Antegrade flow.      LEFT CAROTID FINDINGS: There is minimal atherosclerotic plaque in the  carotid bifurcation.     Left ICA PSV:  108  cm/sec.  Left ICA EDV:  21 cm/sec.  Left ICA/CCA PSV Ratio:  1.17      These indicate less than 50% diameter stenosis of the left ICA  relative to the distal ICA.     Left Vertebral: Antegrade flow.   Left ECA: Antegrade flow.     Causes of Decreased Accuracy:  None.     Incidentally noted is a left sided thyroid nodule measuring 2.3 x 2.4  x 3.2 cm.      Impression:       IMPRESSION:    1. 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  2. Less than 50% stenosis left carotid bifurcation.  3. 2.3 x 2.4 x 3.2 cm left-sided thyroid  nodule.    IVAN SCHWARZ MD      NM Lung Scan Ventilation and Perfusion [[id number]]  Resulted: 07/27/17 2041, Result status: Final result    Ordering provider: Kenton Blackwood MD  07/27/17 1644 Resulted by: Mark Schwarz MD    Performed: 07/27/17 1910 - 07/27/17 1940 Resulting lab: RADIOLOGY RESULTS    Narrative:       NM LUNG SCAN VENTILATION AND PERFUSION   7/27/2017 7:40 PM     HISTORY:  rule out PE    TECHNIQUE: 50.8 mCi technetium DTPA for ventilation, 4.8 mCi  technetium MAA for perfusion.     COMPARISON:   Nuclear Study: None.  Other Relevant Studies: Chest x-ray dated 7/27/2017    FINDINGS:  The perfusion scan is negative. No moderate or large  perfusion defects are seen. Ventilation scan is negative. No  ventilation defects are seen.      Impression:       IMPRESSION: Negative, no pulmonary emboli are identified.    IVAN SCHWARZ MD      Chest 2 Views* [[id number]]  Resulted: 07/27/17 1531, Result status: Final result    Ordering provider: Kenton Blackwood MD  07/27/17 1243 Resulted by: Heidy Bernardo MD    Performed: 07/27/17 1318 - 07/27/17 1322 Resulting lab: RADIOLOGY RESULTS    Narrative:       CHEST TWO VIEWS  7/27/2017 1:22 PM     HISTORY: Hypoxia, unclear source, aspiration risk.    COMPARISON: 7/26/2017.      Impression:       IMPRESSION: Elevated right diaphragm. Shallow inspiration. Mild  cardiomegaly. No acute infiltrates or pleural effusion. No change.    HEIDY BERNARDO MD      Chest XR,  PA & LAT [[id number]]  Resulted: 07/26/17 1617, Result status: Final result    Ordering provider: Robert Borjas MD  07/26/17 1536 Resulted by: Garland Mcdaniel MD    Performed: 07/26/17 1559 - 07/26/17 1615 Resulting lab: RADIOLOGY RESULTS    Narrative:       XR CHEST 2 VW 7/26/2017 4:15 PM    HISTORY: Cough.    COMPARISON: None.    FINDINGS: Low lung volume. No airspace consolidation, pleural effusion  or pneumothorax. Benign granuloma in the left upper lung. Normal heart  size.       Impression:       IMPRESSION: No acute abnormality.    JUVENTINO GORDON MD      CT Head Neck Angio w/o & w Contrast [[id number]]  Resulted: 07/26/17 1536, Result status: Final result    Ordering provider: Robert Borjas MD  07/26/17 1135 Resulted by: iWlmer Bernardo MD    Performed: 07/26/17 1141 - 07/26/17 1444 Resulting lab: RADIOLOGY RESULTS    Narrative:       CT ANGIOGRAM OF THE HEAD AND NECK WITHOUT AND WITH CONTRAST  7/26/2017  2:44 PM     HISTORY: Left facial droop, possible stroke.    TECHNIQUE:  Precontrast localizing scans were followed by attempted CT  angiography with an injection of 3 mL Isovue 370 IV with scans through  the head and neck. However, the IV blew because of poor IV access and  the skin thus did not trigger. Subsequent iSTAT creatinine measurement  indicated GFR of 29, so the emergency physician, Dr. Robert Borjas, was  contacted and he decided to abort the rest of the exam.    FINDINGS: Noncontrast scans done for a subtraction mask show aneurysm  clip in the region of the right middle cerebral artery and right  pterional craniotomy with brain atrophy. There are bilateral  intraocular lens implants. Noncontrast scans of the neck show arterial  calcification in the region of the right carotid bifurcation and  otherwise are unremarkable.      Impression:       IMPRESSION: Noncontrast scans only because the contrast-enhanced  component of the exam was aborted by Dr. Robert Borjas after renal  insufficiency became apparent.    WILMER BERNARDO MD      MR Brain w/o Contrast [[id number]]  Resulted: 07/26/17 1536, Result status: Final result    Ordering provider: Robert Borjas MD  07/26/17 1232 Resulted by: Wilmer Bernardo MD    Performed: 07/26/17 1245 - 07/26/17 1349 Resulting lab: RADIOLOGY RESULTS    Narrative:       MRI BRAIN WITHOUT CONTRAST  7/26/2017 1:49 PM    HISTORY:  Stroke with left facial droop.    TECHNIQUE:  Multiplanar, multisequence MRI of the brain without  gadolinium  IV contrast material.  Renal insufficiency.    COMPARISON:  CT scan earlier today.    FINDINGS:  There is generalized atrophy of the brain. White matter  changes are seen in the cerebral hemispheres consistent with sequelae  of small vessel ischemic disease. Old lacunar infarct is seen in the  right corona radiata. There is no evidence of hemorrhage, mass, acute  infarct, or anomaly.  Old microhemorrhage is noted in the right  cerebellar hemisphere centrally. Artifacts from the right middle  cerebral artery region aneurysm clip obscure detail in the right  temporal lobe and inferior right frontal lobe.    There are bilateral intraocular lens implants. The arteries at the  base of the brain and the dural venous sinuses appear patent.       Impression:       IMPRESSION:    1. No evidence of acute infarct, acute hemorrhage or mass.  2. Brain atrophy and white matter changes consistent with sequelae of  small vessel ischemic disease.  3. Old lacunar infarct in the right corona radiata.  4. Old microhemorrhage in the right cerebellar hemisphere.  5. 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    WILMER BERNARDO MD      MRA Neck w/o Contrast Angiogram [[id number]]  Resulted: 07/26/17 1536, Result status: Final result    Ordering provider: Robert Borjas MD  07/26/17 1231 Resulted by: Wilmer Bernardo MD    Performed: 07/26/17 1245 - 07/26/17 1349 Resulting lab: RADIOLOGY RESULTS    Narrative:       MRA NECK WITHOUT CONTRAST  7/26/2017 1:49 PM     HISTORY: Left facial droop. Stroke symptoms.    TECHNIQUE: 2D time-of-flight MR angiogram of the neck without  contrast. Estimates of carotid stenoses are made relative to the  distal internal carotid artery diameters except as noted. Renal  insufficiency.    COMPARISON: None.    FINDINGS:    Right Carotid: Patent but tortuous. Artifacts from arterial pulsation  make it impossible to exclude stenoses.    Left Carotid:  Tortuous vessel.  No definite stenosis in the  bifurcation. Poor visualization of the common carotid artery due to  pulsation artifacts.    Vertebral and Basilar:  Dominant left vertebral artery and tiny right  vertebral artery. Vessels are tortuous but patent. Cannot rule out  stenosis.      Impression:       IMPRESSION:  Patent main vessels but artifacts from pulsation and  patient motion severely limit detail, making it impossible to exclude  stenoses.    WILMER BERNARDO MD      Head MRA w/o contrast - STROKE PROTOCOL [[id number]]  Resulted: 07/26/17 1536, Result status: Final result    Ordering provider: Robert Borjas MD  07/26/17 1228 Resulted by: Wilmer Bernardo MD    Performed: 07/26/17 1245 - 07/26/17 1314 Resulting lab: RADIOLOGY RESULTS    Narrative:       MR ANGIOGRAM OF THE HEAD WITHOUT CONTRAST   7/26/2017 1:14 PM     HISTORY: Left facial droop. Stroke symptoms. Intermittent headache and  dizziness. Previous aneurysm clipping.    TECHNIQUE:  3D time-of-flight MR angiogram of the head without  contrast.    COMPARISON: 3/14/2008.    FINDINGS:  Artifacts are seen from the right middle cerebral artery  region aneurysm clip that obscure most of the right middle cerebral  artery and its branches. Internal carotid arteries and basilar and  vertebral arteries are patent with dominant left vertebral artery and  with tiny right vertebral artery ending in the PICA as a normal  variant. Posterior cerebral arteries are patent. Tiny saccular  aneurysm projecting superiorly off the left middle cerebral artery M1  segment measures 2.5 mm diameter, slightly larger than on the previous  exam when it measured 2.2 mm on the raw data images. No other new  aneurysm. Right anterior cerebral artery A1 segment is either absent  or hypoplastic.      Impression:       IMPRESSION:  1. Minimal increase in size of the small saccular aneurysm projecting  superiorly off the left middle cerebral artery M1 segment, now 2.5 mm  diameter.  2.  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  3. No evidence of arterial occlusion elsewhere.    WILMER BERNARDO MD      CT Head w/o Contrast [[id number]]  Resulted: 07/26/17 1536, Result status: Final result    Ordering provider: Robert Borjas MD  07/26/17 1135 Resulted by: Wilmer Bernardo MD    Performed: 07/26/17 1141 - 07/26/17 1150 Resulting lab: RADIOLOGY RESULTS    Narrative:       CT SCAN OF THE HEAD WITHOUT CONTRAST   7/26/2017 11:50 AM     HISTORY: Stroke symptoms. Left facial droop for an hour. 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    TECHNIQUE:  Axial images of the head and coronal reformations without  IV contrast material. Radiation dose for this scan was reduced using  automated exposure control, adjustment of the mA and/or kV according  to patient size, or iterative reconstruction technique.    COMPARISON: 3/14/2008 MRI and MR angiogram 10/28/2008.    FINDINGS:  There is generalized atrophy of the brain.  There is low  attenuation in the white matter of the cerebral hemispheres consistent  with sequelae of small vessel ischemic disease. Right middle cerebral  artery region aneurysm clip and old right pterional craniotomy with  mesh cause some artifacts that obscure detail locally. There is no  evidence of intracranial hemorrhage, mass, acute infarct or anomaly.     The visualized portions of the sinuses and mastoids appear normal.  There is no evidence of trauma.      Impression:       IMPRESSION:   1. No evidence of acute hemorrhage.  2.  Atrophy of the brain.  White matter changes consistent with  sequelae of small vessel ischemic disease.  3. 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    WILMER BERNARDO MD      Testing Performed By     Lab - Abbreviation Name Director Address Valid Date Range    932 - Dml  Rslts RADIOLOGY RESULTS Unknown Unknown 05 1553 - Present               ECG/EMG Results      ECHO COMPLETE BUBBLE STUDY WITH OPTISON [[id number]]  Resulted: 17 1132, Result status: Edited Result - FINAL    Ordering provider: Vinny Siddiqui MD  17 1755 Resulted by: Robert Mao MD    Performed: 17 1156 - 17 1156 Resulting lab: RADIOLOGY RESULTS    Narrative:       [id number]  [facility]  [id number]  [id number]^CHEN^VINNY^PRABHAKAR           [facility]  Echocardiography Laboratory  [address] [place] [address]        Name: VICKIE REID  MRN: [id number]  : 1932  Study Date: 2017 11:32 AM  Age: 84 yrs  Gender: Female  Patient Location: [facility]  Reason For Study: Cerebrovascular Incident  Ordering Physician: VINNY SIDDIQUI  Referring Physician: Mirian Aguilera  Performed By: Regina Downs RDCS     BSA: 1.9 m2  Height: 63 in  Weight: 190 lb  HR: 61  BP: 185/87 mmHg  _____________________________________________________________________________  __        Procedure  Complete Portable Bubble Echo Adult. Contrast Optison.  _____________________________________________________________________________  __        Interpretation Summary     There is no color Doppler evidence of an atrial shunt.  A contrast injection (Bubble Study) was performed that was negative for flow  across the interatrial septum (Valsalva maneuver not done).  The visual ejection fraction is estimated at 60-65%.  Normal left ventricular wall motion  The right ventricle is normal in size and function.  The rhythm was normal sinus.  The study was technically difficult. Limited views were obtained. Contrast was  used without apparent complications. No hemodynamically significant valvular  abnormalities on 2D or color flow imaging.  _____________________________________________________________________________  __        Left Ventricle  The left ventricle is normal in size. There is normal  left ventricular wall  thickness. The visual ejection fraction is estimated at 60-65%. E by E prime  ratio is between 8 and 15, which is indeterminate for assessment of left  ventricular filling pressures. The transmitral spectral Doppler flow pattern  is normal for age. Normal left ventricular wall motion.     Right Ventricle  The right ventricle is normal in size and function.     Atria  The left atrium is mildly dilated. The left atrium is not well visualized.  Right atrial size is normal. There is no color Doppler evidence of an atrial  shunt. A contrast injection (Bubble Study) was performed that was negative for  flow across the interatrial septum.     Mitral Valve  The mitral valve is not well visualized. There is trace to mild mitral  regurgitation.        Tricuspid Valve  The tricuspid valve is not well visualized. There is physiologic tricuspid  regurgitation. Right ventricular systolic pressure could not be approximated  due to inadequate tricuspid regurgitation. Small IVC (<1.5cm) with normal  respiratory collapse; right atrial pressure is estimated at 0-5mmHg.     Aortic Valve  The aortic valve is trileaflet with aortic valve sclerosis. There is trace  aortic regurgitation. No aortic stenosis is present.     Pulmonic Valve  The pulmonic valve is not well visualized. There is trace pulmonic valvular  regurgitation. Normal pulmonic valve velocity.     Vessels  Mild aortic root dilatation. The ascending aorta is Mildly dilated. The IVC is  normal in size and reactivity with respiration, suggesting normal central  venous pressure.     Pericardium  There is no pericardial effusion.        Rhythm  The rhythm was normal sinus.  _____________________________________________________________________________  __  MMode/2D Measurements & Calculations  IVSd: 1.3 cm     LVIDd: 4.8 cm  LVIDs: 2.8 cm  LVPWd: 1.2 cm  FS: 42.1 %  EDV(Teich): 109.1 ml  ESV(Teich): 29.4 ml  LV mass(C)d: 235.4 grams  LV mass(C)dI: 124.5  grams/m2  Ao root diam: 4.1 cm  LA dimension: 3.9 cm  asc Aorta Diam: 3.9 cm  LA/Ao: 0.93  LA Volume (BP): 83.0 ml  LA Volume Index (BP): 43.9 ml/m2           Doppler Measurements & Calculations  MV E max guido: 84.3 cm/sec  MV A max guido: 85.0 cm/sec  MV E/A: 0.99  MV dec time: 0.22 sec  Lateral E/e': 11.7  Medial E/e': 19.2           _____________________________________________________________________________  __           Report approved by: Mora Macdonald 07/27/2017 05:12 PM       1    Type Source Collected On     07/27/17 1132          View Image (below)        Echo complete [[id number]]  Resulted: 07/27/17 1156, Result status: In process    Ordering provider: Shamar Orellana MD  07/26/17 1755 Performed: 07/27/17 1156 - 07/27/17 1156    Resulting lab: RADIANT                   Encounter-Level Documents:     There are no encounter-level documents.      Order-Level Documents:     There are no order-level documents.

## 2017-07-26 NOTE — ED NOTES
Received report from prev shift. Pt cont to have mild left facial droop, pt a/o, c/o headache and some nausea, medications had been given by prev shift.

## 2017-07-26 NOTE — ED NOTES
Pt started having blurred vision.  Pt having problems with word finding off and on since yesterday.  Staff at nursing home and patient noted L facial droop starting 40 minutes before arrival.  Pt is alert and oriented right now.  Equal strength in arms and legs.  L side of face has mild droop.  Pt continues to state that her vision just seems off on L side.  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  Denies any pain.  Glucose done by EMS.  Dr Borjas in room at bedside.  Stroke eval called.

## 2017-07-26 NOTE — ED NOTES
Pt complaining of headache in back of head.  Given Tylenol, water, offered something to eat.  Pt hadn't wanted anything to eat.  Now patient having nausea, spitting up saliva.  Pt just not feeling very well.  No change in neuro status.

## 2017-07-26 NOTE — PLAN OF CARE
Problem: Pain, Acute (Adult)  Goal: Acceptable Pain Control/Comfort Level  Patient will demonstrate the desired outcomes by discharge/transition of care.   Patient states she has pain in right hip, takes oxycodone and tylenol. She has already had a hip replacement in the left hip. Will check orders for pain management and treat patient as appropriate. She has been told to ask for pain meds if needed.

## 2017-07-26 NOTE — IP AVS SNAPSHOT
"    [facility] INTENSIVE CARE: [phone]                                              INTERAGENCY TRANSFER FORM - PHYSICIAN ORDERS   2017                    Hospital Admission Date: 2017  VICKIE REID   : 1932  Sex: Female        Attending Provider: Kenton Blackwood MD     Allergies:  Accupril, Ace Inhibitors, Augmentin, Levofloxacin, Morphine, Norvasc [Amlodipine Besylate]    Infection:  None   Service:  HOSPITALIST    Ht:  1.6 m (5' 3\")   Wt:  86.4 kg (190 lb 7.6 oz)   Admission Wt:  86.5 kg (190 lb 11.2 oz)    BMI:  33.74 kg/m 2   BSA:  1.96 m 2            Patient PCP Information     Provider PCP Type    Mirian Aguilera MD General      ED Clinical Impression     Diagnosis Description Comment Added By Time Added    Facial droop [R29.810] Facial droop [R29.810] possible CVA Robert Borjas MD 2017  5:21 PM      Hospital Problems as of 2017              Priority Class Noted POA    CVA (cerebral vascular accident) (H) Medium  2017 Yes      Non-Hospital Problems as of 2017              Priority Class Noted    Abdominal pain, generalized Medium  3/15/2006    Atherosclerosis of renal artery (H) Medium  Unknown    Gastrointestinal malfunction arising from mental factors Medium  Unknown    Other chest pain Medium  Unknown    Esophageal reflux Medium  Unknown    Insomnia Medium  Unknown    Cerebral aneurysm, nonruptured Medium  Unknown    Cardiac dysrhythmias NEC Medium  Unknown    Essential hypertension, benign Medium  2006    Congenital cystic kidney disease Medium  2006    Benign neoplasm of colon Medium  10/4/2006    Renovascular hypertension Medium  2006    Backache Medium  10/3/2007    Epistaxis Medium  10/3/2007    Temporomandibular joint disorder Medium  2008    CHF (congestive heart failure) (H) Medium  2008    Restrictive lung disease Medium  2008    CARDIOVASCULAR SCREENING; LDL GOAL LESS THAN 100 Medium  10/31/2010    " Osteoporosis Medium  10/25/2011    S/P laminectomy Medium  10/25/2011    Hip joint replacement status Medium  4/18/2012    Osteoarthritis Medium  4/18/2012    DDD (degenerative disc disease), lumbar Medium  4/18/2012    Mild major depression (H) Medium  4/18/2012    Incontinence of urine Medium  4/18/2012    Hypokalemia Medium  5/15/2012    Essential hypertension Medium  6/20/2017    Urinary tract infection without hematuria, site unspecified Medium  6/20/2017    Chronic obstructive pulmonary disease, unspecified COPD type (H) Medium  7/25/2017    Generalized muscle weakness Medium  7/25/2017    Dizziness Medium  7/25/2017    Osteoarthritis of right hip, unspecified osteoarthritis type Medium  7/25/2017    Hypotension, unspecified hypotension type Medium  7/25/2017      Code Status History     Date Active Date Inactive Code Status Order ID Comments User Context    7/28/2017  3:43 PM  Full Code [id number]  Kenton Blackwood MD Outpatient    7/26/2017  5:55 PM 7/28/2017  3:43 PM Full Code [id number]  Shamar Orellana MD Inpatient         Medication Review      START taking        Dose / Directions Comments    atorvastatin 40 MG tablet   Commonly known as:  LIPITOR   Used for:  Stenosis of right carotid artery        Dose:  40 mg   Take 1 tablet (40 mg) by mouth daily   Quantity:  30 tablet   Refills:  3        clopidogrel 75 MG tablet   Commonly known as:  PLAVIX   Used for:  Facial droop        Dose:  75 mg   Take 1 tablet (75 mg) by mouth daily   Quantity:  30 tablet   Refills:  0          CONTINUE these medications which have NOT CHANGED        Dose / Directions Comments    ACIDOPHILUS PO        Dose:  1 capsule   Take 1 capsule by mouth 2 times daily   Refills:  0        budesonide-formoterol 80-4.5 MCG/ACT Inhaler   Commonly known as:  SYMBICORT        Dose:  2 puff   Inhale 2 puffs into the lungs 2 times daily   Refills:  0        calcium carb 1250 mg (500 mg [place])/vitamin D 200 units 500-200 MG-UNIT per  tablet   Commonly known as:  OSCAL with D        Dose:  1 tablet   Take 1 tablet by mouth daily   Refills:  0        CELEXA PO        Dose:  20 mg   Take 20 mg by mouth daily   Refills:  0        CEPACOL DUAL RELIEF PO        Give 1 dose by mouth every 2 hours as needed for Sore throat Per RSO - package instructions 1 lozenge Q 2 hours.   Refills:  0        COLACE PO        Dose:  100 mg   Take 100 mg by mouth daily   Refills:  0        cyanocobalamin 1000 MCG tablet   Commonly known as:  vitamin  B-12        Dose:  1000 mcg   Take 1,000 mcg by mouth daily   Refills:  0        ICY HOT LIDOCAINE PLUS MENTHOL EX        Externally apply topically 3 times daily Apply to hips   Refills:  0        isosorbide mononitrate 30 MG 24 hr tablet   Commonly known as:  IMDUR        Dose:  30 mg   Take 30 mg by mouth daily   Refills:  0        LASIX PO        Dose:  20 mg   Take 20 mg by mouth 2 times daily   Refills:  0        LISINOPRIL PO        Dose:  20 mg   Take 20 mg by mouth daily   Refills:  0        MAGNESIUM OXIDE PO        Dose:  250 mg   Take 250 mg by mouth daily   Refills:  0        NAMENDA PO        Dose:  5 mg   Take 5 mg by mouth daily   Refills:  0        NEURONTIN PO        Dose:  300 mg   Take 300 mg by mouth 2 times daily   Refills:  0        nystatin 581969 UNIT/GM Powd   Commonly known as:  MYCOSTATIN        Apply topically 2 times daily   Refills:  0        OXYCODONE HCL PO        Dose:  2.5 mg   Take 2.5 mg by mouth every 4 hours as needed Give 2.5 mg po Q 4 hrs prn pain   Refills:  0        potassium chloride 20 MEQ Packet   Commonly known as:  KLOR-CON        Dose:  10 mEq   Take 10 mEq by mouth daily Give 10 mEq by mouth one time a day for supplement   Refills:  0        PRILOSEC PO        Dose:  20 mg   Take 20 mg by mouth every morning   Refills:  0        tiotropium 18 MCG capsule   Commonly known as:  SPIRIVA        Dose:  18 mcg   Inhale 18 mcg into the lungs daily   Refills:  0        TRIMETHOPRIM  PO        Dose:  100 mg   Take 100 mg by mouth daily Give 1 tablet by mouth one time a day for preventative for UTI   Refills:  0        TYLENOL PO        Dose:  1000 mg   Take 1,000 mg by mouth 3 times daily   Refills:  0          STOP taking     ASPIRIN PO           ZOCOR PO                   After Care     Activity - Up with nursing assistance           Advance Diet as Tolerated       Follow this diet upon discharge: Cardiac       General info for SNF       Length of Stay Estimate: Short Term Care: Estimated # of Days <30  Condition at Discharge: Improving  Level of care:skilled   Rehabilitation Potential: Good  Admission H&P remains valid and up-to-date: Yes  Recent Chemotherapy: N/A  Use Nursing Home Standing Orders: Yes             Referrals     Med Therapy Manage Referral       Your provider has referred you to: **[facility] Scheduling (numerous locations) [phone]   [email or web address]    Reason for Referral: AJAY    The [facility] department will contact you to schedule an appointment.  You may also schedule the appointment by calling [phone].  For [facility] [place] patients, please call [phone] to confirm/schedule your appointment on the next business day.    This service is designed to help you get the most from your medications.  A specially trained Pharmacist will work closely with you and your providers to solve any questions, concerns, issues or problems related to your medications.    Please bring all of your prescription and non-prescription medications (such as vitamins, over-the-counter medications, and herbals) or a detailed medication list to your appointment.    If you have a glucose meter or other home monitoring information, please also bring this to your appointment (i.e. blood glucose log, blood pressure log, pain log, etc.).       NEUROLOGY ADULT REFERRAL       Your  provider has referred you for the following:   Consult at [facility]: [facility] [phone]   [email or web address]  [facility]: [facility] [phone]   [email or web address]    Please be aware that coverage of these services is subject to the terms and limitations of your health insurance plan.  Call member services at your health plan with any benefit or coverage questions.      Please bring the following with you to your appointment:    (1) Any X-Rays, CTs or MRIs which have been performed.  Contact the facility where they were done to arrange for  prior to your scheduled appointment.    (2) List of current medications  (3) This referral request   (4) Any documents/labs given to you for this referral       Occupational Therapy Adult Consult       Evaluate and treat as clinically indicated.    Reason:  Weakness, left facial droop, suspect CVA       Physical Therapy Adult Consult       Evaluate and treat as clinically indicated.    Reason:  Weakness, left facial droop, suspect CVA       Speech Language Path Adult Consult       Evaluate and treat as clinically indicated.    Reason:  Weakness, left facial droop, suspect CVA             Your next 10 appointments already scheduled     Sep 25, 2017  9:30 AM CDT   [facility]   [facility]    [address] [address] [phone]              Statement of Approval     Ordered          07/28/17 1544  I have reviewed and agree with all the recommendations and orders detailed in this document.  EFFECTIVE NOW     Approved and electronically signed by:  Kenton Blackwood MD

## 2017-07-26 NOTE — IP AVS SNAPSHOT
` ` Patient Information     Patient Name Sex     Jazmin Clifton ([id number]) Female 1932       Room Bed    1001 1001-01      Patient Demographics     Address Phone    [address]  [place] [address] [phone] (Home)      Patient Ethnicity & Race     Ethnic Group Patient Race    American White      Emergency Contact(s)     Name Relation Home Work Mobile    JENY CLIFTON   [phone]    Lucius Clifton Son [phone]       Documents on File        Status Date Received Description       Documents for the Patient    Privacy Notice - [place] Received 03     Face Sheet  03/15/06     Insurance Card  03/15/06     Consent Form  07     Other  07     Face Sheet  07     Other  10/13/07 medicare form     Consent Form  10/05/08     Face Sheet  10/13/07     Insurance Card  07     External Medication Information Consent       Patient ID Received 17     Consent for Services - Hospital/Clinic       Consent for Services - Hospital/Clinic Received () 10/25/11     Consent for EHR Access Received 17     [facility] Specified Other       Consent for Services/Privacy Notice - Hospital/Clinic Received 17     Advance Directives and Living Will Received 17 POLST 2017    Insurance Card Received 17 Medicare    Insurance Card Received 17 [employer]    Consent for Services/Privacy Notice - Hospital/Clinic       Physical Therapy Certification Received 17 INPATIENT       Documents for the Encounter    CMS IM for Patient Signature Received 17     ECG   ECG Report      Admission Information     Attending Provider Admitting Provider Admission Type Admission Date/Time    Kenton Blackwood MD Eikens, John Patrick, MD Emergency 17  1120    Discharge Date Hospital Service Auth/Cert Status Service Area     Hospitalist [facility] SERVICES    Unit Room/Bed Admission Status       [place] INTENSIVE CARE 1001/1001-01 Admission  (Confirmed)       Admission     Complaint    CVA (cerebral vascular accident) (H)      Hospital Account     Name Acct ID Class Status Primary Coverage    Jazmin Clifton [id number] Inpatient Open MEDICARE - MEDICARE FOR HB SUPPLEMENT            Guarantor Account (for Hospital Account #[id number])     Name Relation to Pt Service Area Active? Acct Type    Jazmin Clifton  FCS Yes Personal/Family    Address Phone          [address] [place] [address] [phone](H)              Coverage Information (for Hospital Account #[id number])     1. MEDICARE/MEDICARE FOR HB SUPPLEMENT     F/O Payor/Plan Precert #    MEDICARE/MEDICARE FOR HB SUPPLEMENT     Subscriber Subscriber #    Jazmin Clifton [id number]    Address Phone    [employer] CLAIM[employer]  [address]  [place] [address] [phone]          2. MEDICA/MEDICA PRIME SOLUTION     F/O Payor/Plan Precert #    MEDICA/MEDICA PRIME SOLUTION     Subscriber Subscriber #    Jazmin Clifton [id number]    Address Phone    [address] [address]  [place], [place] [id number] [phone]

## 2017-07-26 NOTE — ED PROVIDER NOTES
"  History     Chief Complaint   Patient presents with     Facial Droop     Pt started having facail droop 30 minutes ago.  EMS states patient has no arm drift on exam.     HPI  Jazmin Clifton is a 84 year old female with a history of hypertension, hypokalemia, benign neoplasm of colon, COPD, arthrosclerosis of renal artery and osteoarthritis who presents by ambulance with a facial droop. The patient developed a facial droop at approximately 11:00 AM today with associated blurred vision. She states she has had trouble speaking since yesterday and has a hard time finding her words. \"I have a had time with my saliva too.\" The patient states the left side of her face also feels hot and heavy. 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 The patient denies any new weakness in her legs.  She denies any numbness or weakness in her upper extremities.  She has not had any fevers recently she has a history of COPD but says she hasn't been coughing significantly.  She denies any bowel or bladder dysfunction.  Patient had recently been admitted to        Patient Active Problem List   Diagnosis     Abdominal pain, generalized     Atherosclerosis of renal artery (H)     Gastrointestinal malfunction arising from mental factors     Other chest pain     Esophageal reflux     Insomnia     Cerebral aneurysm, nonruptured     Cardiac dysrhythmias NEC     Essential hypertension, benign     Congenital cystic kidney disease     Benign neoplasm of colon     Renovascular hypertension     Backache     Epistaxis     Temporomandibular joint disorder     CHF (congestive heart failure) (H)     Restrictive lung disease     CARDIOVASCULAR SCREENING; LDL GOAL LESS THAN 100     Osteoporosis     S/P laminectomy     Hip joint replacement status     Osteoarthritis     DDD (degenerative disc disease), lumbar     Mild major depression (H)     Incontinence of urine     Hypokalemia     Essential hypertension     Urinary " tract infection without hematuria, site unspecified     Chronic obstructive pulmonary disease, unspecified COPD type (H)     Generalized muscle weakness     Dizziness     Osteoarthritis of right hip, unspecified osteoarthritis type     Hypotension, unspecified hypotension type     Current Outpatient Prescriptions   Medication Sig Dispense Refill     Lidocaine-Menthol (ICY HOT LIDOCAINE PLUS MENTHOL EX) Externally apply topically 3 times daily Apply to hips       TRIMETHOPRIM PO Take 100 mg by mouth daily Give 1 tablet by mouth one time a day for preventative for UTI       Benzocaine-Glycerin-DM (CEPACOL DUAL RELIEF PO) Give 1 dose by mouth every 2 hours as needed for Sore throat Per RSO - package instructions 1 lozenge Q 2 hours.       Docusate Sodium (COLACE PO) Take 100 mg by mouth daily       Memantine HCl (NAMENDA PO) Take 5 mg by mouth daily       nystatin (MYCOSTATIN) 534791 UNIT/GM POWD Apply topically 2 times daily       OXYCODONE HCL PO Take 5 mg by mouth See Admin Instructions Give 2.5 mg po Q 4 hrs prn pain       potassium chloride (KLOR-CON) 20 MEQ Packet Take 10 mEq by mouth daily Give 10 mEq by mouth one time a day for supplement       ASPIRIN PO Take 81 mg by mouth daily       MAGNESIUM OXIDE PO Take 250 mg by mouth daily       calcium carb 1250 mg, 500 mg [place],/vitamin D 200 units (OSCAL WITH D) 500-200 MG-UNIT per tablet Take 1 tablet by mouth daily       tiotropium (SPIRIVA) 18 MCG capsule Inhale 18 mcg into the lungs daily       budesonide-formoterol (SYMBICORT) 80-4.5 MCG/ACT Inhaler Inhale 2 puffs into the lungs 2 times daily       Acetaminophen (TYLENOL PO) Take 1,000 mg by mouth 3 times daily        Furosemide (LASIX PO) Take 20 mg by mouth 2 times daily        Citalopram Hydrobromide (CELEXA PO) Take 10 mg by mouth daily Give 2 tablet by mouth one time a day       Isosorbide Mononitrate (IMDUR PO) Take 30 mg by mouth daily        LISINOPRIL PO Take 20 mg by mouth daily        Gabapentin  (NEURONTIN PO) Take 300 mg by mouth 2 times daily        Omeprazole (PRILOSEC PO) Take 20 mg by mouth every morning       cyanocobalamin (VITAMIN  B-12) 1000 MCG tablet Take 1,000 mcg by mouth daily       Simvastatin (ZOCOR PO) Take 10 mg by mouth At Bedtime       Allergies   Allergen Reactions     Accupril      Ace Inhibitors      Accupril     Augmentin      Levofloxacin      Morphine      Norvasc [Amlodipine Besylate]      Leg swelling         I have reviewed the Medications, Allergies, Past Medical and Surgical History, and Social History in the Epic system.         Review of Systems   Constitutional: Positive for activity change and appetite change. Negative for chills and fever.   HENT: Positive for drooling. Negative for congestion.    Eyes: Positive for visual disturbance (blurred vision).   Respiratory: Negative for chest tightness and shortness of breath.    Cardiovascular: Negative for chest pain and leg swelling.   Gastrointestinal: Negative for nausea and vomiting.   Musculoskeletal: Negative for back pain, gait problem and neck pain.   Skin: Negative for rash.   Neurological: Positive for facial asymmetry and speech difficulty. Negative for dizziness, seizures, weakness, light-headedness, numbness and headaches.   Hematological: Does not bruise/bleed easily.   Psychiatric/Behavioral: Negative for confusion.     All other systems were reviewed and are negative.     Physical Exam      Physical Exam   Constitutional: She appears well-developed and well-nourished. No distress.   Psychiatric: She has a normal mood and affect.   Nursing note and vitals reviewed.    HENT: Oral mucosa moist, no lesions. Posterior pharynx without significant erythema or edema.   Neuro: Left forehead with partial raising of huy muscles less than right side. Minor facial droop around lip on left side.   Neck: Neck supple  Cardiovascular: Heart with regular rate and rhythm, no murmur.   Pulmonary/Chest: Lungs clear to  auscultation bilaterally.  Abdominal: Abdomen is soft, non-distended, and non-tender.   Musculoskeletal: Moving all extremities well.   Neurological: Alert. Cranial nerves in tact other than facial droop. Symmetrical strength in upper and lower extremities. No drift in any extremities. Alert are oriented X3 GCS: 15  Skin: No rash.     ED Course     ED Course     Procedures     [employer] Stroke Scale  Exam Interval: Baseline   Score    Level of consciousness: (0)   Alert, keenly responsive    LOC questions: (0)   Answers both questions correctly    LOC commands: (0)   Performs both tasks correctly    Best gaze: (0)   Normal    Visual: (0)   No visual loss    Facial palsy: (1)   Minor paralysis (flat nasolabial fold, smile asymmetry)    Motor arm (left): (0)   No drift    Motor arm (right): (0)   No drift    Motor leg (left): (0)   No drift    Motor leg (right): (0)   No drift    Limb ataxia: (0)   Absent    Sensory: (0)   Normal- no sensory loss    Best language: (0)   Normal- no aphasia    Dysarthria: (0)   Normal    Extinction and inattention: (0)   No abnormality        Total Score:  1                EKG Interpretation:      Interpreted by Robert Borjas  Rhythm: sinus bradycardia  Rate: 50-60  Axis: Normal  Ectopy: none  Conduction: nonspecific interventricular conduction block  ST Segments/ T Waves: Non-specific ST-T wave changes  Q Waves: anterior  Comparison to prior: Unchanged from 2006    Clinical Impression:NSR with interventricular conduction block , q waves in the anterior lead with non specific st-twave changes.       The patient has stroke symptoms:           ED Stroke specific documentation           NIHSS PDF          Protocol PDF     Patient last known well time: last night  ED Provider first to bedside at: 1120  CT Results received at:1145  Patient was not treated with TPA due to the following reason(s):  Mild stroke symptoms ( NIHSS < 4 and not globally aphasic)             Stroke Mimics were considered (including migraine headache, seizure disorder, hypoglycemia (or hyperglycemia), head or spinal trauma, CNS infection, Toxin ingestion and shock state (e.g. sepsis) .      Evaluation/Treatement was delayed due to: inability to obtain a CTA due to patients renal insufficiency             Labs Ordered and Resulted from Time of ED Arrival Up to the Time of Departure from the ED   CBC WITH PLATELETS DIFFERENTIAL - Abnormal; Notable for the following:        Result Value    Hemoglobin 11.0 (*)     MCHC 30.9 (*)     All other components within normal limits   BASIC METABOLIC PANEL - Abnormal; Notable for the following:     Glucose 112 (*)     Urea Nitrogen 39 (*)     Creatinine 1.50 (*)     GFR Estimate 33 (*)     GFR Estimate If Black 40 (*)     All other components within normal limits   CREATININE POCT - Abnormal; Notable for the following:     Creatinine 1.7 (*)     GFR Estimate 29 (*)     GFR Estimate If Black 35 (*)     All other components within normal limits   URINE MACROSCOPIC WITH REFLEX TO MICRO - Abnormal; Notable for the following:     Blood Urine Trace (*)     RBC Urine 5 (*)     All other components within normal limits   LACTIC ACID WHOLE BLOOD - Abnormal; Notable for the following:     Lactic Acid 0.6 (*)     All other components within normal limits   INR   PARTIAL THROMBOPLASTIN TIME   TROPONIN I   VITAL SIGNS AND NEURO CHECKS   ACTIVITY   PULSE OXIMETRY NURSING   GLUCOSE MONITOR NURSING POCT   ASSESSMENT   NOTIFY   STRAIGHT CATH FOR URINE       Results for orders placed or performed during the hospital encounter of 07/26/17 (from the past 24 hour(s))   CBC with platelets differential   Result Value Ref Range    WBC 4.8 4.0 - 11.0 10e9/L    RBC Count 3.98 3.8 - 5.2 10e12/L    Hemoglobin 11.0 (L) 11.7 - 15.7 g/dL    Hematocrit 35.6 35.0 - 47.0 %    MCV 89 78 - 100 fl    MCH 27.6 26.5 - 33.0 pg    MCHC 30.9 (L) 31.5 - 36.5 g/dL    RDW 14.2 10.0 - 15.0 %    Platelet Count  156 150 - 450 10e9/L    Diff Method Automated Method     % Neutrophils 65.9 %    % Lymphocytes 19.7 %    % Monocytes 9.7 %    % Eosinophils 3.7 %    % Basophils 0.8 %    % Immature Granulocytes 0.2 %    Absolute Neutrophil 3.2 1.6 - 8.3 10e9/L    Absolute Lymphocytes 1.0 0.8 - 5.3 10e9/L    Absolute Monocytes 0.5 0.0 - 1.3 10e9/L    Absolute Eosinophils 0.2 0.0 - 0.7 10e9/L    Absolute Basophils 0.0 0.0 - 0.2 10e9/L    Abs Immature Granulocytes 0.0 0 - 0.4 10e9/L   Basic metabolic panel   Result Value Ref Range    Sodium 137 133 - 144 mmol/L    Potassium 4.0 3.4 - 5.3 mmol/L    Chloride 104 94 - 109 mmol/L    Carbon Dioxide 29 20 - 32 mmol/L    Anion Gap 4 3 - 14 mmol/L    Glucose 112 (H) 70 - 99 mg/dL    Urea Nitrogen 39 (H) 7 - 30 mg/dL    Creatinine 1.50 (H) 0.52 - 1.04 mg/dL    GFR Estimate 33 (L) >60 mL/min/1.7m2    GFR Estimate If Black 40 (L) >60 mL/min/1.7m2    Calcium 8.9 8.5 - 10.1 mg/dL   INR   Result Value Ref Range    INR 1.07 0.86 - 1.14   Partial thromboplastin time   Result Value Ref Range    PTT 30 22 - 37 sec   Troponin I   Result Value Ref Range    Troponin I ES  0.000 - 0.045 ug/L     <0.015  The 99th percentile for upper reference range is 0.045 ug/L.  Troponin values in   the range of 0.045 - 0.120 ug/L may be associated with risks of adverse   clinical events.     CT Head w/o Contrast    Narrative    CT SCAN OF THE HEAD WITHOUT CONTRAST   7/26/2017 11:50 AM     HISTORY: Stroke symptoms. Left facial droop for an hour. 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    TECHNIQUE:  Axial images of the head and coronal reformations without  IV contrast material. Radiation dose for this scan was reduced using  automated exposure control, adjustment of the mA and/or kV according  to patient size, or iterative reconstruction technique.    COMPARISON: 3/14/2008 MRI and MR angiogram 10/28/2008.    FINDINGS:  There is generalized atrophy of the brain.   There is low  attenuation in the white matter of the cerebral hemispheres consistent  with sequelae of small vessel ischemic disease. Right middle cerebral  artery region aneurysm clip and old right pterional craniotomy with  mesh cause some artifacts that obscure detail locally. There is no  evidence of intracranial hemorrhage, mass, acute infarct or anomaly.     The visualized portions of the sinuses and mastoids appear normal.  There is no evidence of trauma.      Impression    IMPRESSION:   1. No evidence of acute hemorrhage.  2.  Atrophy of the brain.  White matter changes consistent with  sequelae of small vessel ischemic disease.  3. 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    HEIDY BERNARDO MD   Creatinine POCT   Result Value Ref Range    Creatinine 1.7 (H) 0.52 - 1.04 mg/dL    GFR Estimate 29 (L) >60 mL/min/1.7m2    GFR Estimate If Black 35 (L) >60 mL/min/1.7m2   Head MRA w/o contrast - STROKE PROTOCOL    Narrative    MR ANGIOGRAM OF THE HEAD WITHOUT CONTRAST   7/26/2017 1:14 PM     HISTORY: Left facial droop. Stroke symptoms. Intermittent headache and  dizziness. Previous aneurysm clipping.    TECHNIQUE:  3D time-of-flight MR angiogram of the head without  contrast.    COMPARISON: 3/14/2008.    FINDINGS:  Artifacts are seen from the right middle cerebral artery  region aneurysm clip that obscure most of the right middle cerebral  artery and its branches. Internal carotid arteries and basilar and  vertebral arteries are patent with dominant left vertebral artery and  with tiny right vertebral artery ending in the PICA as a normal  variant. Posterior cerebral arteries are patent. Tiny saccular  aneurysm projecting superiorly off the left middle cerebral artery M1  segment measures 2.5 mm diameter, slightly larger than on the previous  exam when it measured 2.2 mm on the raw data images. No other new  aneurysm. Right anterior  cerebral artery A1 segment is either absent  or hypoplastic.      Impression    IMPRESSION:  1. Minimal increase in size of the small saccular aneurysm projecting  superiorly off the left middle cerebral artery M1 segment, now 2.5 mm  diameter.  2. 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  3. No evidence of arterial occlusion elsewhere.    HEIDY BERNARDO MD   MRA Neck w/o Contrast Angiogram    Narrative    MRA NECK WITHOUT CONTRAST  7/26/2017 1:49 PM     HISTORY: Left facial droop. Stroke symptoms.    TECHNIQUE: 2D time-of-flight MR angiogram of the neck without  contrast. Estimates of carotid stenoses are made relative to the  distal internal carotid artery diameters except as noted. Renal  insufficiency.    COMPARISON: None.    FINDINGS:    Right Carotid: Patent but tortuous. Artifacts from arterial pulsation  make it impossible to exclude stenoses.    Left Carotid:  Tortuous vessel. No definite stenosis in the  bifurcation. Poor visualization of the common carotid artery due to  pulsation artifacts.    Vertebral and Basilar:  Dominant left vertebral artery and tiny right  vertebral artery. Vessels are tortuous but patent. Cannot rule out  stenosis.      Impression    IMPRESSION:  Patent main vessels but artifacts from pulsation and  patient motion severely limit detail, making it impossible to exclude  stenoses.    HEIDY BERNARDO MD   MR Brain w/o Contrast    Narrative    MRI BRAIN WITHOUT CONTRAST  7/26/2017 1:49 PM    HISTORY:  Stroke with left facial droop.    TECHNIQUE:  Multiplanar, multisequence MRI of the brain without  gadolinium IV contrast material.  Renal insufficiency.    COMPARISON:  CT scan earlier today.    FINDINGS:  There is generalized atrophy of the brain. White matter  changes are seen in the cerebral hemispheres consistent with sequelae  of small vessel ischemic disease. Old lacunar infarct is seen in the  right corona radiata. There is no  evidence of hemorrhage, mass, acute  infarct, or anomaly.  Old microhemorrhage is noted in the right  cerebellar hemisphere centrally. Artifacts from the right middle  cerebral artery region aneurysm clip obscure detail in the right  temporal lobe and inferior right frontal lobe.    There are bilateral intraocular lens implants. The arteries at the  base of the brain and the dural venous sinuses appear patent.       Impression    IMPRESSION:    1. No evidence of acute infarct, acute hemorrhage or mass.  2. Brain atrophy and white matter changes consistent with sequelae of  small vessel ischemic disease.  3. Old lacunar infarct in the right corona radiata.  4. Old microhemorrhage in the right cerebellar hemisphere.  5. 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    HEIDY BERNARDO MD   CT Head Neck Angio w/o & w Contrast    Narrative    CT ANGIOGRAM OF THE HEAD AND NECK WITHOUT AND WITH CONTRAST  7/26/2017  2:44 PM     HISTORY: Left facial droop, possible stroke.    TECHNIQUE:  Precontrast localizing scans were followed by attempted CT  angiography with an injection of 3 mL Isovue 370 IV with scans through  the head and neck. However, the IV blew because of poor IV access and  the skin thus did not trigger. Subsequent iSTAT creatinine measurement  indicated GFR of 29, so the emergency physician, Dr. Robert Borjas, was  contacted and he decided to abort the rest of the exam.    FINDINGS: Noncontrast scans done for a subtraction mask show aneurysm  clip in the region of the right middle cerebral artery and right  pterional craniotomy with brain atrophy. There are bilateral  intraocular lens implants. Noncontrast scans of the neck show arterial  calcification in the region of the right carotid bifurcation and  otherwise are unremarkable.      Impression    IMPRESSION: Noncontrast scans only because the contrast-enhanced  component of the exam was aborted by Dr. Robert Borjas after  renal  insufficiency became apparent.    HEIDY BERNARDO MD   UA reflex to Microscopic   Result Value Ref Range    Color Urine Light Yellow     Appearance Urine Clear     Glucose Urine Negative NEG mg/dL    Bilirubin Urine Negative NEG    Ketones Urine Negative NEG mg/dL    Specific Gravity Urine 1.008 1.003 - 1.035    Blood Urine Trace (A) NEG    pH Urine 7.0 5.0 - 7.0 pH    Protein Albumin Urine Negative NEG mg/dL    Urobilinogen mg/dL Normal 0.0 - 2.0 mg/dL    Nitrite Urine Negative NEG    Leukocyte Esterase Urine Negative NEG    Source Catheterized Urine     RBC Urine 5 (H) 0 - 2 /HPF    WBC Urine 2 0 - 2 /HPF   Lactic acid whole blood   Result Value Ref Range    Lactic Acid 0.6 (L) 0.7 - 2.1 mmol/L   Chest XR,  PA & LAT    Narrative    XR CHEST 2 VW 7/26/2017 4:15 PM    HISTORY: Cough.    COMPARISON: None.    FINDINGS: Low lung volume. No airspace consolidation, pleural effusion  or pneumothorax. Benign granuloma in the left upper lung. Normal heart  size.      Impression    IMPRESSION: No acute abnormality.    JUVENTINO GORDON MD       Medications   0.9% sodium chloride infusion (500 mLs Intravenous Rate/Dose Change 7/26/17 1504)   0.9% sodium chloride BOLUS (0 mLs Intravenous Stopped 7/26/17 1426)   iopamidol (ISOVUE-370) solution 70 mL (35 mLs Intravenous Given 7/26/17 1439)   acetaminophen (TYLENOL) tablet 650 mg (650 mg Oral Given 7/26/17 1506)   ondansetron (ZOFRAN) injection 4 mg (4 mg Intravenous Given 7/26/17 1515)        11:19 AM patient assessed.     11:29 AM code stroke called.     12:10 PM phone discussion with Dr. Crocker: Due to low GFR, would not do CTA. Will do MRI/MRA. He would not give TPA. We have stopped the code stroke.       Assessments & Plan (with Medical Decision Making) patient was evaluated immediately upon arrival.  An NIHSS score was obtained.  Patient has a facial droop with some sparing of the forehead muscles this could therefore be a central cause.  NIHSS score was 1.  Patient  was sent to CT for further evaluation and care.  Patient's GFR came back at 28, I therefore contacted Dr. Schofield  The CT scan of the head revealed no acute abnormality and with a low NIHss scar and an elevated creatinine was decided to hold off on doing a CTA.  He stated he would not give TPA to this patient.  He did recommend getting an MRI MRA.  This was ordered and obtained.  Patient's white count was 4.8 and hemoglobin 11.0 platelet count 166.  Basic metabolic panel was significant for creatinine 1.5 and GFR of 33 with a BUN of 39.  Patient was given a fluid bolus.  Patient's troponin was within normal limits.  Lactic acid was 0.6.  Urinalysis with trace blood.  Findings were discussed in detail with patient and family.  I did a chest x-ray due to.  Some crackles in the patient's lung.  MRI MRA revealed no acute abnormality.  This presents of a clipped aneurysm in the small 2nd aneurysm measuring 2.5 mm as compared to 2.2 mm previously.  There is some difficulty due to artifact from arterial pulsation and stenosis could not completely be ruled out.  I reviewed the case with Dr. Schofield felt this was more than likely metabolic more than stroke related but didn't think patient should possibly be admitted overnight for observation.  He again did not think the patient qualified for TPA.  Findings discussed with patient and family and they are in agreement with the plan.  I contacted Dr. Melgar's who is in agreement with admitting the patient for further evaluation and care.       I have reviewed the nursing notes.    I have reviewed the findings, diagnosis, plan and need for follow up with the patient.       Current Discharge Medication List          Final diagnoses:   Facial droop - possible CVA     This document serves as a record of the services and decisions personally performed and made by No att. providers found. It was created on HIS/HER behalf by Cat Barton, a trained medical scribe. The creation of  this document is based the provider's statements to the medical scribe.  Cat Barton 11:19 AM 7/26/2017    Provider:   The information in this document, created by the medical scribe for me, accurately reflects the services I personally performed and the decisions made by me. I have reviewed and approved this document for accuracy prior to leaving the patient care area.  No att. providers found 11:19 AM 7/26/2017 7/26/2017   [facility] EMERGENCY DEPARTMENT      Robert Borjas MD  07/28/17 0858

## 2017-07-26 NOTE — PLAN OF CARE
Problem: Stroke (Ischemic) (Adult)  Goal: Signs and Symptoms of Listed Potential Problems Will be Absent or Manageable (Stroke)  Signs and symptoms of listed potential problems will be absent or manageable by discharge/transition of care (reference Stroke (Ischemic) (Adult) CPG).   Patient admitted for an overnight stay to monitor for any change in presenting symptoms consistent with CVA (facial droop, vision changes and word finding difficulties)The patient did have all negative CT's and she is alert and Ox4 on admission to the unit. Her strength is equal in all 4 extremities, she admits to the use of a walker in the nursing home. She is currently enjoying a meal and is not having difficulty eating or swallowing. Will continue to assess and evaluate. Stable and without any worsening symptoms.

## 2017-07-26 NOTE — IP AVS SNAPSHOT
[facility]    [address] [address]    Phone:  [phone]    Fax:  [phone]                                       After Visit Summary   7/26/2017    Jazmin Clifton    MRN: [id number]           After Visit Summary Signature Page     I have received my discharge instructions, and my questions have been answered. I have discussed any challenges I see with this plan with the nurse or doctor.    ..........................................................................................................................................  Patient/Patient Representative Signature      ..........................................................................................................................................  Patient Representative Print Name and Relationship to Patient    ..................................................               ................................................  Date                                            Time    ..........................................................................................................................................  Reviewed by Signature/Title    ...................................................              ..............................................  Date                                                            Time

## 2017-07-26 NOTE — PROGRESS NOTES
[facility] [facility] ADMISSION NOTE    Patient admitted to room 1001 at approximately 1820 via cart from emergency room. Patient was accompanied by nurse.     Verbal SBAR report received from Sabrina ELKINS prior to patient arrival.     Patient ambulated to bed with stand-by assist. Patient alert and oriented X 4. Pain in right hip (has been treated for this pain in past) 0-10 Pain Scale: 3. Admission vital signs: Blood pressure 179/74, temperature 97.9  F (36.6  C), temperature source Oral, resp. rate 20, SpO2 (!) 89 %. Patient was oriented to plan of care, bed controls, tv, telephone, bathroom and visiting hours.     The following safety risks were identified during admission: fall. Yellow risk band applied: YES.     Mariajose Pardo

## 2017-07-26 NOTE — IP AVS SNAPSHOT
"` `     [facility] INTENSIVE CARE: [phone]                 INTERAGENCY TRANSFER FORM - NOTES (H&P, Discharge Summary, Consults, Procedures, Therapies)   2017                    Hospital Admission Date: 2017  VICKIE REID   : [id number]  Sex: Female        Patient PCP Information     Provider PCP Type    Mirian Aguilera MD General         History & Physicals      H&P by Shamar Orellana MD at 2017  5:51 PM     Author:  Shamar Orellana MD Service:  Hospitalist Author Type:  Physician    Filed:  2017  8:55 PM Date of Service:  2017  5:51 PM Creation Time:  2017  5:33 PM    Status:  Addendum :  Shamar Orellana MD (Physician)         [facility]    History and Physical  Hospital Medicine       Date of Admission:  2017  Date of Service: 2017     Assessment & Plan      Acute left facial droop, visual disturbance , reports of transient left hand weakness--probable CVA  -tele  -echo  -stop asa and start plavix  -stop simvastatin and start atorvastin  -hold lisinopril (permissive hypertension)    htn  -hold lisinopril    Previous right MCA aneurysm clipping with known left MCA aneurysm under surveillance and minimally changed    MRI suggests previous lacunar stroke    gerd    Diastolic CHF- compensated    CKD--recent creatinine rise this summer and recent hospitalization at [facility] for \"dehydration\". Creat currently at recent baseline    Restrictive lung ds.    Prophylaxis-SCD's        DVT Prophylaxis: Anti-embolisim stockings (TEDs)  Code Status: Full Code    Disposition: Anticipate discharge in 2 day(s). Appropriate for inpatient care.    Shamar Orellana MD        History is obtained from the patient and review of old records via the EMR.    Past Medical History      Past Medical History:   Diagnosis Date     ABDOMINAL PAIN GENERALIZED 3/15/2006    1 month of abdominal pain that bryn radiates into her back and " "chest.  Pt was hospitilzed 2x for the pain at [facility] --pt hopsitized for 3 days.  Rcords not ab=vailable.  She was told either \" twisted intestine or blockage of bowel.  Pt feels it is the hiatal hernia.  Pt hospitilized again a second time  A month ago.  Ct scans x 2 showed \" nothing.\"  Pt had a barium and xrays.  Pt sa     Abdominal pain, generalized 3/15/2006    1 month of abdominal pain that bryn radiates into her back and chest.  Pt was hospitilzed 2x for the pain at [facility] --pt hopsitized for 3 days.  Rcords not ab=vailable.  She was told either \" twisted intestine or blockage of bowel.  Pt feels it is the hiatal hernia.  Pt hospitilized again a second time  A month ago.  Ct scans x 2 showed \" nothing.\"  Pt had a barium and xrays.  Pt sa     Atherosclerosis of renal artery (H)     Left renal artery stenosis     BENIGN HYPERTENSION 5/1/2006 5/1/2006   Increase catapres to 0.3 mg and decrease clonidine to 0.1 mg daily.  Recheck bp in 1 month.      Benign neoplasm of scalp and skin of neck     Seborrheic keratosis     Cardiac dysrhythmias NEC     Bradycardia, improved on lower dose beta blockers      Cerebral aneurysm, nonruptured     Cerebral Aneurysm     Depressive disorder, not elsewhere classified     Depression     Esophageal reflux     Gastroesophageal Reflux Disease     Female stress incontinence      Gastrointestinal malfunction arising from mental factors     Dyspepsia     Generalized osteoarthrosis, unspecified site     DJD-chronic back pain     Herpes zoster dermatitis of eyelid      Insomnia, unspecified      Lumbago     Chronic back pain     Other chest pain     Atypical Chest Pain     Rectocele     Grade 3     Unspecified disorder of kidney and ureter     Renal insufficiency     Unspecified essential hypertension        Past Surgical History     Past Surgical History:   Procedure Laterality Date     CHOLECYSTECTOMY, LAPOROSCOPIC  1997    Cholecystectomy, Laparoscopic     " HYSTERECTOMY, RAYMOND  1982    oophorectomy,RAYMOND     SURGICAL HISTORY OF -       Laminectomy x 3     SURGICAL HISTORY OF -       MCA Aneurysm repair     SURGICAL HISTORY OF -   1996    Bladder suspension (Bladder Repair x2)     SURGICAL HISTORY OF -       Bilateral Hand Surgeries for Arthritis x2     SURGICAL HISTORY OF -       Repair of a Cerebral Aneurysm        History of Present Illness   Jazmin Clifton is a 84 year old female with the above past medical history now presents with a new left facial droop.The patient apparently had similar symptoms several weeks ago and was evaluated in the emergency room at [facility]. She was discharged from the emergency room. Subsequent to that she developed an episode that she describes it as dehydration that was associated with low blood pressure. She was again hospitalized in [place]. After this hospitalization she was in the TCU at [facility].  She was there until this morning.    In the past 2 days she reports episodes where she's had trouble getting the right words out of her mouth. Her son thinks that this has been more of a chronic intermittent problem.She was in her room this morning when she developed a left facial droop associated with some drooling. She also had some type of vague visual disturbancethat she characterized as printed words jumping out at her. She went on to develop some transient left hand weakness. She had no numbness. She does report  a burning feeling in her left face.     Currently her only symptom is some drooping of her left mouth associated with some drooling. She was evaluated in the emergency room.A CTA was not done due to abnormal renal function She did have an MRI and MRA  Done which showed no acute findings.. 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 An old right corona radiata lacunar infarct was noted on MRI. The patient  is currently on aspirin and low-dose simvastatin.    At this time I am going to admit the patient, put her on telemetry, stop the aspirin, start Plavix, stop simvastatin and start high-dose atorvastatin.  did talk to the [facility] stroke team. I will also order an echo[JE1.1]. Will hold lisinopril for permissive hypertension.[JE1.2]    Prior to Admission Medications   Prior to Admission Medications   Prescriptions Last Dose Informant Patient Reported? Taking?   ASPIRIN PO 7/26/2017 at am Nursing Home Yes Yes   Sig: Take 81 mg by mouth daily   Acetaminophen (TYLENOL PO) 7/26/2017 at am Nursing Home Yes Yes   Sig: Take 1,000 mg by mouth 3 times daily    Benzocaine-Glycerin-DM (CEPACOL DUAL RELIEF PO) Unknown at Unknown time [facility] Yes No   Sig: Give 1 dose by mouth every 2 hours as needed for Sore throat Per RSO - package instructions 1 lozenge Q 2 hours.   Citalopram Hydrobromide (CELEXA PO) 7/26/2017 at am Nursing Home Yes Yes   Sig: Take 20 mg by mouth daily    Docusate Sodium (COLACE PO) Unknown at Unknown time [facility] Yes No   Sig: Take 100 mg by mouth daily   Furosemide (LASIX PO) 7/26/2017 at 0800 Nursing Home Yes Yes   Sig: Take 20 mg by mouth 2 times daily    Gabapentin (NEURONTIN PO) 7/26/2017 at am Nursing Home Yes Yes   Sig: Take 300 mg by mouth 2 times daily    LISINOPRIL PO 7/26/2017 at am Nursing Home Yes Yes   Sig: Take 20 mg by mouth daily    Lactobacillus (ACIDOPHILUS PO) 7/26/2017 at am Nursing Home Yes Yes   Sig: Take 1 capsule by mouth 2 times daily   Lidocaine-Menthol (ICY HOT LIDOCAINE PLUS MENTHOL EX) 7/26/2017 at am Nursing Home Yes Yes   Sig: Externally apply topically 3 times daily Apply to hips   MAGNESIUM OXIDE PO 7/26/2017 at am Nursing Home Yes Yes   Sig: Take 250 mg by mouth daily   Memantine HCl (NAMENDA PO) 7/26/2017 at am Nursing Home Yes Yes   Sig: Take 5 mg by mouth daily   OXYCODONE HCL PO 7/26/2017 at 0700 Nursing Home Yes Yes   Sig: Take 2.5 mg  by mouth every 4 hours as needed Give 2.5 mg po Q 4 hrs prn pain   Omeprazole (PRILOSEC PO) 7/26/2017 at 0700 Nursing Home Yes Yes   Sig: Take 20 mg by mouth every morning   Simvastatin (ZOCOR PO) 7/25/2017 at  Nursing Home Yes Yes   Sig: Take 10 mg by mouth At Bedtime   TRIMETHOPRIM PO 7/26/2017 at am Nursing Home Yes Yes   Sig: Take 100 mg by mouth daily Give 1 tablet by mouth one time a day for preventative for UTI   budesonide-formoterol (SYMBICORT) 80-4.5 MCG/ACT Inhaler 7/26/2017 at am Nursing Home Yes Yes   Sig: Inhale 2 puffs into the lungs 2 times daily   calcium carb 1250 mg, 500 mg [place],/vitamin D 200 units (OSCAL WITH D) 500-200 MG-UNIT per tablet 7/26/2017 at am Nursing Home Yes Yes   Sig: Take 1 tablet by mouth daily   cyanocobalamin (VITAMIN  B-12) 1000 MCG tablet 7/26/2017 at am Nursing Home Yes Yes   Sig: Take 1,000 mcg by mouth daily   isosorbide mononitrate (IMDUR) 30 MG 24 hr tablet 7/26/2017 at am Nursing Home Yes Yes   Sig: Take 30 mg by mouth daily   nystatin (MYCOSTATIN) 340005 UNIT/GM POWD 7/26/2017 at am Nursing Home Yes Yes   Sig: Apply topically 2 times daily   potassium chloride (KLOR-CON) 20 MEQ Packet 7/26/2017 at am Nursing Home Yes Yes   Sig: Take 10 mEq by mouth daily Give 10 mEq by mouth one time a day for supplement   tiotropium (SPIRIVA) 18 MCG capsule 7/26/2017 at am Nursing Home Yes Yes   Sig: Inhale 18 mcg into the lungs daily      Facility-Administered Medications: None     Allergies   Allergies   Allergen Reactions     Accupril      Ace Inhibitors      Accupril     Augmentin      Levofloxacin      Morphine      Norvasc [Amlodipine Besylate]      Leg swelling         Family History    Family History   Problem Relation Age of Onset     CANCER Mother      stomache     CEREBROVASCULAR DISEASE Father      HEART DISEASE Father      CANCER Brother      lymphoma     Eye Disorder Son      Asthma Son      DIABETES Son      GASTROINTESTINAL DISEASE Son      no control over bladder or  bowels     C.A.D. No family hx of      Hypertension No family hx of      Breast Cancer No family hx of      Cancer - colorectal No family hx of      Prostate Cancer No family hx of        Social History   Social History     Social History     Marital status:      Spouse name: N/A     Number of children: N/A     Years of education: N/A     Occupational History     Not on file.     Social History Main Topics     Smoking status: Former Smoker     Quit date: 1/1/1992     Smokeless tobacco: Not on file     Alcohol use Yes      Comment: wine occas.     Drug use: No     Sexual activity: No     Other Topics Concern     Not on file     Social History Narrative   the patient lives in [place] She has been in and out of a TCU recently. Her son lives in the basement of her house. She most recently has been in the TCU at [facility].    Review of Systems   C: NEGATIVE for fever, chills, change in weight  I: NEGATIVE for worrisome rashes, moles or lesions  EYES: vague visual disturbance at onset of symtoms  ENT/MOUTH: left facial droop, drooling  R: NEGATIVE for significant cough or SOB  B: NEGATIVE for masses, tenderness or discharge  CV: NEGATIVE for chest pain, palpitations or peripheral edema  GI: NEGATIVE for nausea, abdominal pain, heartburn, or change in bowel habits  : NEGATIVE for frequency, dysuria, or hematuria  M: NEGATIVE for significant arthralgias or myalgia  NEURO: above and transient left hand weakness---normally walks with walker  E: NEGATIVE for temperature intolerance, skin/hair changes  H: NEGATIVE for bleeding problems  P: NEGATIVE for changes in mood or affect    Physical Exam   /87  Resp 21  SpO2 94%     Weight: 0 lbs 0 oz There is no height or weight on file to calculate BMI.     Constitutional: Alert, oriented, cooperative, no apparent distress, appears nontoxic  Eyes: Eyes are clear, pupils are reactive.  HEENT: Oropharynx is clear and moist. No evidence of cranial trauma.left facial droop  present  Lymph/Hematologic: No epitrochlear, axillary, anterior or posterior cervical, or supraclavicular lymphadenopathy is appreciated.  Cardiovascular: Regular rate and rhythm, normal S1 and S2, and no murmur noted. JVP is normal. Good peripheral pulses in wrists bilaterally. No lower extremity edema.  Respiratory: Clear to auscultation bilaterally.   GI: Soft, non-tender, normal bowel sounds, no hepatosplenomegaly.  Genitourinary: Deferred  Musculoskeletal: Normal muscle bulk and tone.  Skin: Warm and dry, no rashes.   Neurologic: Neck supple. Cranial nerves are grossly intact-except for a left facial droop. Her facial muscles around her eyes have normal strength.  is symmetric. No drift. Motor function is normal. Sensory function is normal.reflexes are symmetric    Data   Data reviewed today:     Recent Labs  Lab 07/26/17  1130   WBC 4.8   HGB 11.0*   MCV 89      INR 1.07      POTASSIUM 4.0   CHLORIDE 104   CO2 29   BUN 39*   CR 1.50*   ANIONGAP 4   BLAIR 8.9   *   TROPI <0.015The 99th percentile for upper reference range is 0.045 ug/L.  Troponin values in the range of 0.045 - 0.120 ug/L may be associated with risks of adverse clinical events.       Recent Results (from the past 24 hour(s))   CT Head w/o Contrast    Narrative    CT SCAN OF THE HEAD WITHOUT CONTRAST   7/26/2017 11:50 AM     HISTORY: Stroke symptoms. Left facial droop for an hour. 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    TECHNIQUE:  Axial images of the head and coronal reformations without  IV contrast material. Radiation dose for this scan was reduced using  automated exposure control, adjustment of the mA and/or kV according  to patient size, or iterative reconstruction technique.    COMPARISON: 3/14/2008 MRI and MR angiogram 10/28/2008.    FINDINGS:  There is generalized atrophy of the brain.  There is low  attenuation in the white matter of the cerebral  hemispheres consistent  with sequelae of small vessel ischemic disease. Right middle cerebral  artery region aneurysm clip and old right pterional craniotomy with  mesh cause some artifacts that obscure detail locally. There is no  evidence of intracranial hemorrhage, mass, acute infarct or anomaly.     The visualized portions of the sinuses and mastoids appear normal.  There is no evidence of trauma.      Impression    IMPRESSION:   1. No evidence of acute hemorrhage.  2.  Atrophy of the brain.  White matter changes consistent with  sequelae of small vessel ischemic disease.  3. 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    HEIDY BERNARDO MD   Head MRA w/o contrast - STROKE PROTOCOL    Narrative    MR ANGIOGRAM OF THE HEAD WITHOUT CONTRAST   7/26/2017 1:14 PM     HISTORY: Left facial droop. Stroke symptoms. Intermittent headache and  dizziness. Previous aneurysm clipping.    TECHNIQUE:  3D time-of-flight MR angiogram of the head without  contrast.    COMPARISON: 3/14/2008.    FINDINGS:  Artifacts are seen from the right middle cerebral artery  region aneurysm clip that obscure most of the right middle cerebral  artery and its branches. Internal carotid arteries and basilar and  vertebral arteries are patent with dominant left vertebral artery and  with tiny right vertebral artery ending in the PICA as a normal  variant. Posterior cerebral arteries are patent. Tiny saccular  aneurysm projecting superiorly off the left middle cerebral artery M1  segment measures 2.5 mm diameter, slightly larger than on the previous  exam when it measured 2.2 mm on the raw data images. No other new  aneurysm. Right anterior cerebral artery A1 segment is either absent  or hypoplastic.      Impression    IMPRESSION:  1. Minimal increase in size of the small saccular aneurysm projecting  superiorly off the left middle cerebral artery M1 segment, now 2.5  mm  diameter.  2. 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  3. No evidence of arterial occlusion elsewhere.    HEIDY BERNARDO MD   MRA Neck w/o Contrast Angiogram    Narrative    MRA NECK WITHOUT CONTRAST  7/26/2017 1:49 PM     HISTORY: Left facial droop. Stroke symptoms.    TECHNIQUE: 2D time-of-flight MR angiogram of the neck without  contrast. Estimates of carotid stenoses are made relative to the  distal internal carotid artery diameters except as noted. Renal  insufficiency.    COMPARISON: None.    FINDINGS:    Right Carotid: Patent but tortuous. Artifacts from arterial pulsation  make it impossible to exclude stenoses.    Left Carotid:  Tortuous vessel. No definite stenosis in the  bifurcation. Poor visualization of the common carotid artery due to  pulsation artifacts.    Vertebral and Basilar:  Dominant left vertebral artery and tiny right  vertebral artery. Vessels are tortuous but patent. Cannot rule out  stenosis.      Impression    IMPRESSION:  Patent main vessels but artifacts from pulsation and  patient motion severely limit detail, making it impossible to exclude  stenoses.    HEIDY BERNARDO MD   MR Brain w/o Contrast    Narrative    MRI BRAIN WITHOUT CONTRAST  7/26/2017 1:49 PM    HISTORY:  Stroke with left facial droop.    TECHNIQUE:  Multiplanar, multisequence MRI of the brain without  gadolinium IV contrast material.  Renal insufficiency.    COMPARISON:  CT scan earlier today.    FINDINGS:  There is generalized atrophy of the brain. White matter  changes are seen in the cerebral hemispheres consistent with sequelae  of small vessel ischemic disease. Old lacunar infarct is seen in the  right corona radiata. There is no evidence of hemorrhage, mass, acute  infarct, or anomaly.  Old microhemorrhage is noted in the right  cerebellar hemisphere centrally. Artifacts from the right middle  cerebral artery region aneurysm clip obscure detail in the  right  temporal lobe and inferior right frontal lobe.    There are bilateral intraocular lens implants. The arteries at the  base of the brain and the dural venous sinuses appear patent.       Impression    IMPRESSION:    1. No evidence of acute infarct, acute hemorrhage or mass.  2. Brain atrophy and white matter changes consistent with sequelae of  small vessel ischemic disease.  3. Old lacunar infarct in the right corona radiata.  4. Old microhemorrhage in the right cerebellar hemisphere.  5. 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    HEIDY BERNARDO MD   CT Head Neck Angio w/o & w Contrast    Narrative    CT ANGIOGRAM OF THE HEAD AND NECK WITHOUT AND WITH CONTRAST  7/26/2017  2:44 PM     HISTORY: Left facial droop, possible stroke.    TECHNIQUE:  Precontrast localizing scans were followed by attempted CT  angiography with an injection of 3 mL Isovue 370 IV with scans through  the head and neck. However, the IV blew because of poor IV access and  the skin thus did not trigger. Subsequent iSTAT creatinine measurement  indicated GFR of 29, so the emergency physician, Dr. Robert Borjas, was  contacted and he decided to abort the rest of the exam.    FINDINGS: Noncontrast scans done for a subtraction mask show aneurysm  clip in the region of the right middle cerebral artery and right  pterional craniotomy with brain atrophy. There are bilateral  intraocular lens implants. Noncontrast scans of the neck show arterial  calcification in the region of the right carotid bifurcation and  otherwise are unremarkable.      Impression    IMPRESSION: Noncontrast scans only because the contrast-enhanced  component of the exam was aborted by Dr. Robert Borjas after renal  insufficiency became apparent.    HEIDY BERNARDO MD   Chest XR,  PA & LAT    Narrative    XR CHEST 2 VW 7/26/2017 4:15 PM    HISTORY: Cough.    COMPARISON: None.    FINDINGS: Low lung volume. No airspace consolidation,  pleural effusion  or pneumothorax. Benign granuloma in the left upper lung. Normal heart  size.      Impression    IMPRESSION: No acute abnormality.    JUVENTINO GORDON MD       I personally reviewed the EKG tracing showing nsr.    Shamar Orellana MD[JE1.1]        Revision History        User Key Date/Time User Provider Type Action    > JE1.2 7/26/2017  8:55 PM Shamar Orellana MD Physician Addend     JE1.1 7/26/2017  5:51 PM Shamar Orellana MD Physician Sign                  Discharge Summaries     No notes of this type exist for this encounter.         Consult Notes      Consults by Anjelica Valiente RN at 7/27/2017  5:15 PM     Author:  Anjelica Valiente RN Service:  Care Coordinator Author Type:      Filed:  7/27/2017  5:15 PM Date of Service:  7/27/2017  5:15 PM Creation Time:  7/27/2017  5:13 PM    Status:  Signed :  Anjelica Valiente RN ()     Consult Orders:    1. Care Transition RN/SW IP Consult [[id number]] ordered by Kenton Blackwood MD at 07/27/17 1029                Care Transition Initial Assessment - RN      Met with: Patient.    DATA[LM1.1]   Active Problems:    CVA (cerebral vascular accident) (H)[LM1.2]       Primary Care Clinic Name:  [facility]     Contact information and PCP information verified: Yes      ASSESSMENT  Cognitive Status: awake, alert and oriented.             Lives With: facility resident  Living Arrangements: extended care facility  Quality Of Family Relationships: unable to assess              Insurance Concerns: No Insurance issues identified          This writer met with pt, introduced self and role.[LM1.1]           Discharge Planner[LM1.2]   Discharge Plans in progress: patient has bed hold at [facility]  Barriers to discharge plan: none  Follow up plan: CTS to follow through hospitalization.       Entered by:[LM1.1] Anjelica Valiente 07/27/2017 5:14 PM[LM1.2]         Anjelica Valiente RN, Care Coordinator [phone][LM1.1]       Revision History   "      User Key Date/Time User Provider Type Action    > LM1.2 7/27/2017  5:15 PM Anjelica Valiente, SEVERO Case Manager Sign     LM1.1 7/27/2017  5:13 PM Anjelica Valiente, SEVERO Case Manager                      Progress Notes - Physician (Notes from 07/25/17 through 07/28/17)      ED Provider Notes by Robert Borjas MD at 7/26/2017 11:12 AM     Author:  Robert Borjas MD Service:  Emergency Medicine Author Type:  Physician    Filed:  7/28/2017  8:58 AM Date of Service:  7/26/2017 11:12 AM Creation Time:  7/26/2017 11:19 AM    Status:  Signed :  Robert Borjas MD (Physician)           History     Chief Complaint   Patient presents with     Facial Droop     Pt started having facail droop 30 minutes ago.  EMS states patient has no arm drift on exam.     CHRISTY Clifton is a 84 year old female with a history of hypertension, hypokalemia, benign neoplasm of colon, COPD, arthrosclerosis of renal artery and osteoarthritis who presents by ambulance with a facial droop. The patient developed a facial droop at approximately 11:00 AM today with associated blurred vision. She states she has had trouble speaking since yesterday and has a hard time finding her words. \"I have a had time with my saliva too.\" The patient states the left side of her face also feels hot and heavy. 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 The patient denies any new weakness in her legs.[WF1.1]  She denies any numbness or weakness in her upper extremities.  She has not had any fevers recently she has a history of COPD but says she hasn't been coughing significantly.  She denies any bowel or bladder dysfunction.  Patient had recently been admitted to[DD1.1]        Patient Active Problem List   Diagnosis     Abdominal pain, generalized     Atherosclerosis of renal artery (H)     Gastrointestinal malfunction arising from mental factors     Other chest pain     Esophageal reflux     Insomnia     " Cerebral aneurysm, nonruptured     Cardiac dysrhythmias NEC     Essential hypertension, benign     Congenital cystic kidney disease     Benign neoplasm of colon     Renovascular hypertension     Backache     Epistaxis     Temporomandibular joint disorder     CHF (congestive heart failure) (H)     Restrictive lung disease     CARDIOVASCULAR SCREENING; LDL GOAL LESS THAN 100     Osteoporosis     S/P laminectomy     Hip joint replacement status     Osteoarthritis     DDD (degenerative disc disease), lumbar     Mild major depression (H)     Incontinence of urine     Hypokalemia     Essential hypertension     Urinary tract infection without hematuria, site unspecified     Chronic obstructive pulmonary disease, unspecified COPD type (H)     Generalized muscle weakness     Dizziness     Osteoarthritis of right hip, unspecified osteoarthritis type     Hypotension, unspecified hypotension type     Current Outpatient Prescriptions   Medication Sig Dispense Refill     Lidocaine-Menthol (ICY HOT LIDOCAINE PLUS MENTHOL EX) Externally apply topically 3 times daily Apply to hips       TRIMETHOPRIM PO Take 100 mg by mouth daily Give 1 tablet by mouth one time a day for preventative for UTI       Benzocaine-Glycerin-DM (CEPACOL DUAL RELIEF PO) Give 1 dose by mouth every 2 hours as needed for Sore throat Per RSO - package instructions 1 lozenge Q 2 hours.       Docusate Sodium (COLACE PO) Take 100 mg by mouth daily       Memantine HCl (NAMENDA PO) Take 5 mg by mouth daily       nystatin (MYCOSTATIN) 284628 UNIT/GM POWD Apply topically 2 times daily       OXYCODONE HCL PO Take 5 mg by mouth See Admin Instructions Give 2.5 mg po Q 4 hrs prn pain       potassium chloride (KLOR-CON) 20 MEQ Packet Take 10 mEq by mouth daily Give 10 mEq by mouth one time a day for supplement       ASPIRIN PO Take 81 mg by mouth daily       MAGNESIUM OXIDE PO Take 250 mg by mouth daily       calcium carb 1250 mg, 500 mg "[employer]/vitamin D 200 units (OSCAL WITH  D) 500-200 MG-UNIT per tablet Take 1 tablet by mouth daily       tiotropium (SPIRIVA) 18 MCG capsule Inhale 18 mcg into the lungs daily       budesonide-formoterol (SYMBICORT) 80-4.5 MCG/ACT Inhaler Inhale 2 puffs into the lungs 2 times daily       Acetaminophen (TYLENOL PO) Take 1,000 mg by mouth 3 times daily        Furosemide (LASIX PO) Take 20 mg by mouth 2 times daily        Citalopram Hydrobromide (CELEXA PO) Take 10 mg by mouth daily Give 2 tablet by mouth one time a day       Isosorbide Mononitrate (IMDUR PO) Take 30 mg by mouth daily        LISINOPRIL PO Take 20 mg by mouth daily        Gabapentin (NEURONTIN PO) Take 300 mg by mouth 2 times daily        Omeprazole (PRILOSEC PO) Take 20 mg by mouth every morning       cyanocobalamin (VITAMIN  B-12) 1000 MCG tablet Take 1,000 mcg by mouth daily       Simvastatin (ZOCOR PO) Take 10 mg by mouth At Bedtime       Allergies   Allergen Reactions     Accupril      Ace Inhibitors      Accupril     Augmentin      Levofloxacin      Morphine      Norvasc [Amlodipine Besylate]      Leg swelling         I have reviewed the Medications, Allergies, Past Medical and Surgical History, and Social History in the Epic system.[WF1.1]         Review of Systems   Constitutional: Positive for[WF1.2] activity change[DD1.2] and[WF1.2] appetite change[DD1.2]. Negative for[WF1.2] chills[DD1.2] and[WF1.2] fever[DD1.2].   HENT: Positive for[WF1.2] drooling[DD1.2]. Negative for[WF1.2] congestion[DD1.2].    Eyes: Positive for[WF1.2] visual disturbance (blurred vision)[WF1.3].   Respiratory: Negative for[WF1.2] chest tightness[DD1.2] and[WF1.2] shortness of breath[DD1.2].    Cardiovascular: Negative for[WF1.2] chest pain[DD1.2] and[WF1.2] leg swelling[DD1.2].   Gastrointestinal: Negative for[WF1.2] nausea[DD1.2] and[WF1.2] vomiting[DD1.2].   Musculoskeletal: Negative for[WF1.2] back pain[DD1.2],[WF1.2] gait problem[DD1.2] and[WF1.2] neck pain[DD1.2].   Skin: Negative for[WF1.2] rash[DD1.2].    Neurological: Positive for[WF1.2] facial asymmetry[DD1.2] and[WF1.2] speech difficulty[WF1.3]. Negative for[WF1.2] dizziness[DD1.2],[WF1.2] seizures[DD1.2],[WF1.2] weakness[WF1.3],[WF1.2] light-headedness[DD1.2],[WF1.2] numbness[DD1.2] and[WF1.2] headaches[DD1.2].   Hematological:[WF1.2] Does not bruise/bleed easily[DD1.2].   Psychiatric/Behavioral: Negative for[WF1.2] confusion[DD1.2].[WF1.2]     All other systems were reviewed and are negative.     Physical Exam[WF1.1]      Physical Exam   Constitutional: She appears[WF1.2] well-developed[DD1.2] and[WF1.2] well-nourished[DD1.2].[WF1.2] No distress[DD1.2].   Psychiatric: She has a[WF1.2] normal mood and affect[DD1.2].[WF1.2]   Nursing note[DD1.2] and[WF1.2] vitals[DD1.2] reviewed.[WF1.2]    HENT: Oral mucosa moist, no lesions. Posterior pharynx without significant erythema or edema.   Neuro: Left forehead with partial raising of huy muscles less than right side. Minor facial droop around lip on left side.   Neck: Neck supple  Cardiovascular: Heart with regular rate and rhythm, no murmur.   Pulmonary/Chest: Lungs clear to auscultation bilaterally.  Abdominal: Abdomen is soft, non-distended, and non-tender.   Musculoskeletal: Moving all extremities well.   Neurological: Alert. Cranial nerves in tact other than facial droop. Symmetrical strength in upper and lower extremities. No drift in any extremities. Alert are oriented X3 GCS:[WF1.3] 15[DD1.1]  Skin: No rash.[WF1.3]     ED Course     ED Course     Procedures     National Institutes of Health Stroke Scale  Exam Interval:[WF1.1] Baseline[WF1.3]   Score    Level of consciousness:[WF1.1] (0)   Alert, keenly responsive[WF1.3]    LOC questions:[WF1.1] (0)   Answers both questions correctly[WF1.3]    LOC commands:[WF1.1] (0)   Performs both tasks correctly[WF1.3]    Best gaze:[WF1.1] (0)   Normal[WF1.3]    Visual:[WF1.1] (0)   No visual loss[WF1.3]    Facial palsy:[WF1.1] (1)   Minor paralysis (flat nasolabial  fold, smile asymmetry)[WF1.3]    Motor arm (left):[WF1.1] (0)   No drift[WF1.3]    Motor arm (right):[WF1.1] (0)   No drift[WF1.3]    Motor leg (left):[WF1.1] (0)   No drift[WF1.3]    Motor leg (right):[WF1.1] (0)   No drift[WF1.3]    Limb ataxia:[WF1.1] (0)   Absent[WF1.3]    Sensory:[WF1.1] (0)   Normal- no sensory loss[WF1.3]    Best language:[WF1.1] (0)   Normal- no aphasia[WF1.3]    Dysarthria:[WF1.1] (0)   Normal[WF1.3]    Extinction and inattention:[WF1.1] (0)   No abnormality[WF1.3]        Total Score:[WF1.1]  1[WF1.3]                EKG Interpretation:      Interpreted by Robert Borjas  Rhythm: sinus bradycardia  Rate: 50-60  Axis: Normal  Ectopy: none  Conduction: nonspecific interventricular conduction block  ST Segments/ T Waves: Non-specific ST-T wave changes  Q Waves: anterior  Comparison to prior: Unchanged from 2006    Clinical Impression:NSR with interventricular conduction block , q waves in the anterior lead with non specific st-twave changes.       The patient has stroke symptoms:           ED Stroke specific documentation           NIHSS PDF          Protocol PDF     Patient last known well time: last night  ED Provider first to bedside at: 1120  CT Results received at:1145  Patient was not treated with TPA due to the following reason(s):  Mild stroke symptoms ( NIHSS < 4 and not globally aphasic)            Stroke Mimics were considered (including migraine headache, seizure disorder, hypoglycemia (or hyperglycemia), head or spinal trauma, CNS infection, Toxin ingestion and shock state (e.g. sepsis) .      Evaluation/Treatement was delayed due to: inability to obtain a CTA due to patients renal insufficiency[DD1.1]             Labs Ordered and Resulted from Time of ED Arrival Up to the Time of Departure from the ED   CBC WITH PLATELETS DIFFERENTIAL - Abnormal; Notable for the following:        Result Value    Hemoglobin 11.0 (*)     MCHC 30.9 (*)     All other components within normal  limits   BASIC METABOLIC PANEL - Abnormal; Notable for the following:     Glucose 112 (*)     Urea Nitrogen 39 (*)     Creatinine 1.50 (*)     GFR Estimate 33 (*)     GFR Estimate If Black 40 (*)     All other components within normal limits   CREATININE POCT - Abnormal; Notable for the following:     Creatinine 1.7 (*)     GFR Estimate 29 (*)     GFR Estimate If Black 35 (*)     All other components within normal limits   URINE MACROSCOPIC WITH REFLEX TO MICRO - Abnormal; Notable for the following:     Blood Urine Trace (*)     RBC Urine 5 (*)     All other components within normal limits   LACTIC ACID WHOLE BLOOD - Abnormal; Notable for the following:     Lactic Acid 0.6 (*)     All other components within normal limits   INR   PARTIAL THROMBOPLASTIN TIME   TROPONIN I   VITAL SIGNS AND NEURO CHECKS   ACTIVITY   PULSE OXIMETRY NURSING   GLUCOSE MONITOR NURSING POCT   ASSESSMENT   NOTIFY   STRAIGHT CATH FOR URINE       Results for orders placed or performed during the hospital encounter of 07/26/17 (from the past 24 hour(s))   CBC with platelets differential   Result Value Ref Range    WBC 4.8 4.0 - 11.0 10e9/L    RBC Count 3.98 3.8 - 5.2 10e12/L    Hemoglobin 11.0 (L) 11.7 - 15.7 g/dL    Hematocrit 35.6 35.0 - 47.0 %    MCV 89 78 - 100 fl    MCH 27.6 26.5 - 33.0 pg    MCHC 30.9 (L) 31.5 - 36.5 g/dL    RDW 14.2 10.0 - 15.0 %    Platelet Count 156 150 - 450 10e9/L    Diff Method Automated Method     % Neutrophils 65.9 %    % Lymphocytes 19.7 %    % Monocytes 9.7 %    % Eosinophils 3.7 %    % Basophils 0.8 %    % Immature Granulocytes 0.2 %    Absolute Neutrophil 3.2 1.6 - 8.3 10e9/L    Absolute Lymphocytes 1.0 0.8 - 5.3 10e9/L    Absolute Monocytes 0.5 0.0 - 1.3 10e9/L    Absolute Eosinophils 0.2 0.0 - 0.7 10e9/L    Absolute Basophils 0.0 0.0 - 0.2 10e9/L    Abs Immature Granulocytes 0.0 0 - 0.4 10e9/L   Basic metabolic panel   Result Value Ref Range    Sodium 137 133 - 144 mmol/L    Potassium 4.0 3.4 - 5.3 mmol/L     Chloride 104 94 - 109 mmol/L    Carbon Dioxide 29 20 - 32 mmol/L    Anion Gap 4 3 - 14 mmol/L    Glucose 112 (H) 70 - 99 mg/dL    Urea Nitrogen 39 (H) 7 - 30 mg/dL    Creatinine 1.50 (H) 0.52 - 1.04 mg/dL    GFR Estimate 33 (L) >60 mL/min/1.7m2    GFR Estimate If Black 40 (L) >60 mL/min/1.7m2    Calcium 8.9 8.5 - 10.1 mg/dL   INR   Result Value Ref Range    INR 1.07 0.86 - 1.14   Partial thromboplastin time   Result Value Ref Range    PTT 30 22 - 37 sec   Troponin I   Result Value Ref Range    Troponin I ES  0.000 - 0.045 ug/L     <0.015  The 99th percentile for upper reference range is 0.045 ug/L.  Troponin values in   the range of 0.045 - 0.120 ug/L may be associated with risks of adverse   clinical events.     CT Head w/o Contrast    Narrative    CT SCAN OF THE HEAD WITHOUT CONTRAST   7/26/2017 11:50 AM     HISTORY: Stroke symptoms. Left facial droop for an hour. 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    TECHNIQUE:  Axial images of the head and coronal reformations without  IV contrast material. Radiation dose for this scan was reduced using  automated exposure control, adjustment of the mA and/or kV according  to patient size, or iterative reconstruction technique.    COMPARISON: 3/14/2008 MRI and MR angiogram 10/28/2008.    FINDINGS:  There is generalized atrophy of the brain.  There is low  attenuation in the white matter of the cerebral hemispheres consistent  with sequelae of small vessel ischemic disease. Right middle cerebral  artery region aneurysm clip and old right pterional craniotomy with  mesh cause some artifacts that obscure detail locally. There is no  evidence of intracranial hemorrhage, mass, acute infarct or anomaly.     The visualized portions of the sinuses and mastoids appear normal.  There is no evidence of trauma.      Impression    IMPRESSION:   1. No evidence of acute hemorrhage.  2.  Atrophy of the brain.  White matter changes  consistent with  sequelae of small vessel ischemic disease.  3. 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    HEIDY BERNARDO MD   Creatinine POCT   Result Value Ref Range    Creatinine 1.7 (H) 0.52 - 1.04 mg/dL    GFR Estimate 29 (L) >60 mL/min/1.7m2    GFR Estimate If Black 35 (L) >60 mL/min/1.7m2   Head MRA w/o contrast - STROKE PROTOCOL    Narrative    MR ANGIOGRAM OF THE HEAD WITHOUT CONTRAST   7/26/2017 1:14 PM     HISTORY: Left facial droop. Stroke symptoms. Intermittent headache and  dizziness. Previous aneurysm clipping.    TECHNIQUE:  3D time-of-flight MR angiogram of the head without  contrast.    COMPARISON: 3/14/2008.    FINDINGS:  Artifacts are seen from the right middle cerebral artery  region aneurysm clip that obscure most of the right middle cerebral  artery and its branches. Internal carotid arteries and basilar and  vertebral arteries are patent with dominant left vertebral artery and  with tiny right vertebral artery ending in the PICA as a normal  variant. Posterior cerebral arteries are patent. Tiny saccular  aneurysm projecting superiorly off the left middle cerebral artery M1  segment measures 2.5 mm diameter, slightly larger than on the previous  exam when it measured 2.2 mm on the raw data images. No other new  aneurysm. Right anterior cerebral artery A1 segment is either absent  or hypoplastic.      Impression    IMPRESSION:  1. Minimal increase in size of the small saccular aneurysm projecting  superiorly off the left middle cerebral artery M1 segment, now 2.5 mm  diameter.  2. 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  3. No evidence of arterial occlusion elsewhere.    HEIDY BERNARDO MD   MRA Neck w/o Contrast Angiogram    Narrative    MRA NECK WITHOUT CONTRAST  7/26/2017 1:49 PM     HISTORY: Left facial droop. Stroke symptoms.    TECHNIQUE: 2D time-of-flight MR  angiogram of the neck without  contrast. Estimates of carotid stenoses are made relative to the  distal internal carotid artery diameters except as noted. Renal  insufficiency.    COMPARISON: None.    FINDINGS:    Right Carotid: Patent but tortuous. Artifacts from arterial pulsation  make it impossible to exclude stenoses.    Left Carotid:  Tortuous vessel. No definite stenosis in the  bifurcation. Poor visualization of the common carotid artery due to  pulsation artifacts.    Vertebral and Basilar:  Dominant left vertebral artery and tiny right  vertebral artery. Vessels are tortuous but patent. Cannot rule out  stenosis.      Impression    IMPRESSION:  Patent main vessels but artifacts from pulsation and  patient motion severely limit detail, making it impossible to exclude  stenoses.    HEIDY BERNARDO MD   MR Brain w/o Contrast    Narrative    MRI BRAIN WITHOUT CONTRAST  7/26/2017 1:49 PM    HISTORY:  Stroke with left facial droop.    TECHNIQUE:  Multiplanar, multisequence MRI of the brain without  gadolinium IV contrast material.  Renal insufficiency.    COMPARISON:  CT scan earlier today.    FINDINGS:  There is generalized atrophy of the brain. White matter  changes are seen in the cerebral hemispheres consistent with sequelae  of small vessel ischemic disease. Old lacunar infarct is seen in the  right corona radiata. There is no evidence of hemorrhage, mass, acute  infarct, or anomaly.  Old microhemorrhage is noted in the right  cerebellar hemisphere centrally. Artifacts from the right middle  cerebral artery region aneurysm clip obscure detail in the right  temporal lobe and inferior right frontal lobe.    There are bilateral intraocular lens implants. The arteries at the  base of the brain and the dural venous sinuses appear patent.       Impression    IMPRESSION:    1. No evidence of acute infarct, acute hemorrhage or mass.  2. Brain atrophy and white matter changes consistent with sequelae of  small vessel  ischemic disease.  3. Old lacunar infarct in the right corona radiata.  4. Old microhemorrhage in the right cerebellar hemisphere.  5. 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    HEIDY BERNARDO MD   CT Head Neck Angio w/o & w Contrast    Narrative    CT ANGIOGRAM OF THE HEAD AND NECK WITHOUT AND WITH CONTRAST  7/26/2017  2:44 PM     HISTORY: Left facial droop, possible stroke.    TECHNIQUE:  Precontrast localizing scans were followed by attempted CT  angiography with an injection of 3 mL Isovue 370 IV with scans through  the head and neck. However, the IV blew because of poor IV access and  the skin thus did not trigger. Subsequent iSTAT creatinine measurement  indicated GFR of 29, so the emergency physician, Dr. Robert Borjas, was  contacted and he decided to abort the rest of the exam.    FINDINGS: Noncontrast scans done for a subtraction mask show aneurysm  clip in the region of the right middle cerebral artery and right  pterional craniotomy with brain atrophy. There are bilateral  intraocular lens implants. Noncontrast scans of the neck show arterial  calcification in the region of the right carotid bifurcation and  otherwise are unremarkable.      Impression    IMPRESSION: Noncontrast scans only because the contrast-enhanced  component of the exam was aborted by Dr. Robert Borjas after renal  insufficiency became apparent.    HEIDY BERNARDO MD   UA reflex to Microscopic   Result Value Ref Range    Color Urine Light Yellow     Appearance Urine Clear     Glucose Urine Negative NEG mg/dL    Bilirubin Urine Negative NEG    Ketones Urine Negative NEG mg/dL    Specific Gravity Urine 1.008 1.003 - 1.035    Blood Urine Trace (A) NEG    pH Urine 7.0 5.0 - 7.0 pH    Protein Albumin Urine Negative NEG mg/dL    Urobilinogen mg/dL Normal 0.0 - 2.0 mg/dL    Nitrite Urine Negative NEG    Leukocyte Esterase Urine Negative NEG    Source Catheterized Urine     RBC Urine 5 (H) 0 - 2 /HPF     WBC Urine 2 0 - 2 /HPF   Lactic acid whole blood   Result Value Ref Range    Lactic Acid 0.6 (L) 0.7 - 2.1 mmol/L   Chest XR,  PA & LAT    Narrative    XR CHEST 2 VW 7/26/2017 4:15 PM    HISTORY: Cough.    COMPARISON: None.    FINDINGS: Low lung volume. No airspace consolidation, pleural effusion  or pneumothorax. Benign granuloma in the left upper lung. Normal heart  size.      Impression    IMPRESSION: No acute abnormality.    JUVENTINO GORDON MD       Medications   0.9% sodium chloride infusion (500 mLs Intravenous Rate/Dose Change 7/26/17 1504)   0.9% sodium chloride BOLUS (0 mLs Intravenous Stopped 7/26/17 1426)   iopamidol (ISOVUE-370) solution 70 mL (35 mLs Intravenous Given 7/26/17 1439)   acetaminophen (TYLENOL) tablet 650 mg (650 mg Oral Given 7/26/17 1506)   ondansetron (ZOFRAN) injection 4 mg (4 mg Intravenous Given 7/26/17 1515)[DD1.3]        11:19 AM patient assessed.[WF1.1]     11:29 AM[WF1.4] code[WF1.2] stroke called.[WF1.1]     12:10 PM phone discussion with Dr. Martinez[WF1.5]yuko[WF1.6]: Due to low GFR, would not do CTA. Will do MRI/MRA. He would not give TPA.[WF1.5] We have stopped the code stroke.[WF1.7]       Assessments & Plan (with Medical Decision Making)[WF1.1] patient was evaluated immediately upon arrival.  An NIHSS score was obtained.  Patient has a facial droop with some sparing of the forehead muscles this could therefore be a central cause.  NIHSS score was 1.  Patient was sent to CT for further evaluation and care.  Patient's GFR came back at 28, I therefore contacted Dr. Schofield  The CT scan of the head revealed no acute abnormality and with a low NIHss scar and an elevated creatinine was decided to hold off on doing a CTA.  He stated he would not give TPA to this patient.  He did recommend getting an MRI MRA.  This was ordered and obtained.  Patient's white count was 4.8 and hemoglobin 11.0 platelet count 166.  Basic metabolic panel was significant for creatinine 1.5 and GFR of 33  with a BUN of 39.  Patient was given a fluid bolus.  Patient's troponin was within normal limits.  Lactic acid was 0.6.  Urinalysis with trace blood.  Findings were discussed in detail with patient and family.  I did a chest x-ray due to.  Some crackles in the patient's lung.  MRI MRA revealed no acute abnormality.  This presents of a clipped aneurysm in the small 2nd aneurysm measuring 2.5 mm as compared to 2.2 mm previously.  There is some difficulty due to artifact from arterial pulsation and stenosis could not completely be ruled out.  I reviewed the case with Dr. Schofield felt this was more than likely metabolic more than stroke related but didn't think patient should possibly be admitted overnight for observation.  He again did not think the patient qualified for TPA.  Findings discussed with patient and family and they are in agreement with the plan.  I contacted Dr. Melgar's who is in agreement with admitting the patient for further evaluation and care.[DD1.1]       I have reviewed the nursing notes.    I have reviewed the findings, diagnosis, plan and need for follow up with the patient.[WF1.1]       Current Discharge Medication List[DD1.4]          Final diagnoses:   Facial droop - possible CVA[DD1.5]     This document serves as a record of the services and decisions personally performed and made by No att. providers found. It was created on HIS/HER behalf by Cat Barton, a trained medical scribe. The creation of this document is based the provider's statements to the medical scribe.  Cat Barton 11:19 AM 7/26/2017    Provider:   The information in this document, created by the medical scribe for me, accurately reflects the services I personally performed and the decisions made by me. I have reviewed and approved this document for accuracy prior to leaving the patient care area.  No att. providers found 11:19 AM 7/26/2017 7/26/2017   [facility] EMERGENCY DEPARTMENT[WF1.1]      Robert Borjas  MD Melecio  07/28/17 0858  [DD1.6]     Revision History        User Key Date/Time User Provider Type Action    > DD1.6 7/28/2017  8:58 AM Robert Borjas MD Physician Sign     DD1.5 7/28/2017  8:57 AM Robert Borjas MD Physician      DD1.4 7/28/2017  8:55 AM Robert Borjas MD Physician      DD1.2 7/28/2017  8:54 AM Robert Borjas MD Physician      DD1.3 7/26/2017  5:47 PM Robert Borjas MD Physician Share     DD1.1 7/26/2017  5:30 PM Robert Borjas MD Physician      WF1.7 7/26/2017 12:43 PM Fenne, Cat Scribe Share     WF1.6 7/26/2017 12:20 PM Fenne, Cat Scribe Share     WF1.5 7/26/2017 12:18 PM Fenne, Cat Scribe Share     WF1.3 7/26/2017 11:42 AM Fenne, Cat Scribe Share     WF1.2 7/26/2017 11:33 AM Fenne, Cat Scribe Share     WF1.4 7/26/2017 11:31 AM Fenne, Cat Scribe Share     WF1.1 7/26/2017 11:19 AM Fenne, Cat Scribe             Progress Notes by Isabelle Avery RN at 7/27/2017  5:58 PM     Author:  Isabelle Avery RN Service:  ICU Author Type:  Registered Nurse    Filed:  7/27/2017  6:01 PM Date of Service:  7/27/2017  5:58 PM Creation Time:  7/27/2017  5:58 PM    Status:  Signed :  Isabelle Avery, RN (Registered Nurse)         Pt is doing well today, Mild LACEY recovers with rest. O2 saturation did drop to 88% while asleep. VSS. Pt is eating and drinking well. D dimer elevated and pt started on a Heparin gtt. Scheduled to have a VQ scan tonight. Continue with current plan of care.[JH1.1]     Revision History        User Key Date/Time User Provider Type Action    > JH1.1 7/27/2017  6:01 PM Isabelle Avery, RN Registered Nurse Sign            Progress Notes by Shira Lopez SLP at 7/27/2017  4:09 PM     Author:  Shira Lopez, SLP Service:  Physical Medicine and Rehabilitation Author Type:  Speech Language Pathologist    Filed:  7/27/2017  4:16 PM Date of Service:  7/27/2017  4:09 PM Creation Time:  7/27/2017  4:09 PM    Status:  Signed :   Shira Lopez, SLP (Speech Language Pathologist)         Impressions:   The patient was observed eating and drinking. She demonstrated ability to coordinate breath support, talk, drink and eat. The patient had no s/s of aspiration/penetration. No concerns of aspiration at this time. Recommended diet is regular with thin liquids. Straw okay. No therapy indicated.      07/27/17  Bedside Swallow Evaluation   General Information   Onset Date 07/27/17   Start of Care Date 07/27/17   Referring Physician Shamar Orellana MD   Patient Profile Review/OT: Additional Occupational Profile Info See Profile for full history and prior level of function   Patient/Family Goals Statement None stated   Swallowing Evaluation Bedside swallow evaluation   Behavorial Observations WNL (within normal limits);Alert   Mode of current nutrition Oral diet   Type of oral diet Regular;Thin liquid   Respiratory Status Room air  (room air at trial)   Clinical Swallow Evaluation   Oral Musculature generally intact   Structural Abnormalities none present   Dentition present and adequate;lower dentures  (lower partial)   Mucosal Quality good   Mandibular Strength and Mobility intact   Oral Labial Strength and Mobility impaired retraction;impaired coordination  (left side weakness)   Lingual Strength and Mobility WFL   Velar Elevation intact   Buccal Strength and Mobility intact   Additional Documentation Yes   Clinical Swallow Eval: Thin Liquid Texture Trial   Mode of Presentation, Thin Liquids cup;straw   Volume of Liquid or Food Presented 4 ounces   Oral Phase of Swallow WFL   Pharyngeal Phase of Swallow intact   Diagnostic Statement The patient was observed drinking thin liquids from a straw. She was drinking with consecutive swallow and conversing. No overt s/s of aspiration noted. The patient presented with good AP transfer. Thin liquids are safe for consumption with and without use of straw.    Clinical Swallow Eval: Solid Food Texture  Trial   Mode of Presentation, Solid self-fed   Volume of Solid Food Presented Lunch plate   Oral Phase, Solid WFL   Pharyngeal Phase, Solid intact   Diagnostic Statement The patient was eating lunch at arrival. She was able to multitask while eating. No overt s/s of aspiration were noted. The patient is safe to continue with a regular diet.    VFSS Evaluation   VFSS Additional Documentation No   FEES Evaluation   Additional Documentation No   Swallow Compensations   Swallow Compensations No compensations were used   Results No difficulties noted   Esophageal Phase of Swallow   Patient reports or presents with symptoms of esophageal dysphagia No   Swallow Eval: Clinical Impressions   Skilled Criteria for Therapy Intervention No problems identified which require skilled intervention   Functional Assessment Scale (FAS) 7   Dysphagia Outcome Severity Scale (MARCELINO) Level 7 - MARCELINO   OT: Clinical Decision Making (Complexity) Low complexity   Diet texture recommendations Regular diet;Thin liquids   Recommended Feeding/Eating Techniques (Present food on right side. )   Risks and Benefits of Treatment have been explained. Yes   Patient, family and/or staff in agreement with Plan of Care Yes   Clinical Impression Comments The patient was observed eating and drinking. She demonstrated ability to coordinate breath support, talk, drink and eat. The patient had no S/S of aspiration/penetration. No concerns of aspiration at this time. Recommended diet is regular with thin liquids.    Therapy Certification   Certification date from 07/27/17   Certification date to 07/27/17   Medical Diagnosis Dysphagia   Certification I certify the need for these services furnished under this plan of treatment and while under my care.  (Physician co-signature of this document indicates review and certification of the therapy plan).   Total Evaluation Time   Total Evaluation Time (Minutes) 15[MJ1.1]        Revision History        User Key Date/Time  "User Provider Type Action    > MJ1.1 7/27/2017  4:16 PM Shira Lopez, SLP Speech Language Pathologist Sign            Progress Notes by Uma Johansen PT at 7/27/2017  1:41 PM     Author:  Uma Johansen PT Service:  (none) Author Type:  Physical Therapist    Filed:  7/27/2017  1:41 PM Date of Service:  7/27/2017  1:41 PM Creation Time:  7/27/2017  1:41 PM    Status:  Signed :  Uma Johansen PT (Physical Therapist)         Physical Therapy Evaluation:     07/27/17 1300   Quick Adds   Type of Visit Initial PT Evaluation       Present no   Living Environment   Lives With child(steven), adult   Living Arrangements house   Home Accessibility ramps present at home   Number of Stairs to Enter Home 0   Number of Stairs Within Home 0   Living Environment Comment At baseline, patient lives in a house with her son (son lives in basement). Son works ~8am-5pm Monday - Friday. Pt reports having friends/caregivers come daily except for Thursday where she is completely alone all day. Just started \"Mom's Meals\" but only got 3 meals prior to getting admitted to [facility]. Pt was admitted to hospital from [facility] where she hopes to return.   Self-Care   Usual Activity Tolerance moderate   Current Activity Tolerance fair   Regular Exercise other (see comments)   Activity/Exercise/Self-Care Comment pt was admitted from [facility] where she was getting PT and OT services   Functional Level Prior   Ambulation 1-->assistive equipment   Transferring 1-->assistive equipment   Toileting 1-->assistive equipment   Bathing 2-->assistive person   Dressing 2-->assistive person   Eating 0-->independent   Communication 0-->understands/communicates without difficulty   Swallowing 0-->swallows foods/liquids without difficulty   Cognition 0 - no cognition issues reported   Fall history within last six months yes   Number of times patient has fallen within last six months (numerous falls \"3 really bad ones\")   Which of the " above functional risks had a recent onset or change? ambulation;transferring;fall history   Prior Functional Level Comment FWW at baseline; reports painful R-hip; no supplemental O2 at baseline   General Information   Onset of Illness/Injury or Date of Surgery - Date 07/26/17   Referring Physician Shamar Orellana MD   Patient/Family Goals Statement [facility]   Pertinent History of Current Problem (include personal factors and/or comorbidities that impact the POC) 84 year old female with a history of hypertension, hypokalemia, benign neoplasm of colon, COPD, arthrosclerosis of renal artery and osteoarthritis who presents by ambulance with a facial droop.    Precautions/Limitations fall precautions   General Info Comments Patient received supine in bed and agreeable to PT evaluation/session. Pleasant but reports being exhausted. On 1LPM via nasal cannula with Sp02 = 96%   Cognitive Status Examination   Orientation orientation to person, place and time   Level of Consciousness alert   Follows Commands and Answers Questions 100% of the time   Personal Safety and Judgment intact   Memory intact   Pain Assessment   Patient Currently in Pain (R-hip pain/discomfort but didn't rate numerically)   Posture    Posture Not impaired   Range of Motion (ROM)   ROM Quick Adds No deficits were identified   ROM Comment BLE hip flex, knee flex/ext, DF/PF WFL   Strength   Strength Comments functional LE strength 3+ to 4-/5   Bed Mobility   Bed Mobility Comments SBA for supine to sit   Transfer Skills   Transfer Comments CGA for STS up to FWW   Gait   Gait Comments 3-4 steps only using FWW to transfer from bed to chair at CGA; pt reports minimal activity was fatiguing    Balance   Balance Comments fairly good static standing balance with BUEs onto FWW at [facility] for safety due to initial reports of dizziness upon standing   Sensory Examination   Sensory Perception no deficits were identified   Sensory Perception  "Comments BLE light touch grossly intact   Coordination   Coordination no deficits were identified   Muscle Tone   Muscle Tone no deficits were identified   General Therapy Interventions   Planned Therapy Interventions balance training;bed mobility training;gait training;strengthening;transfer training;progressive activity/exercise   Clinical Impression   Criteria for Skilled Therapeutic Intervention yes, treatment indicated   PT Diagnosis deconditioning/weakness   Influenced by the following impairments decreased activity tolerance, LE weakness, fall risk   Functional limitations due to impairments bed mobility, transfers, ambulation and stair negotiation   Clinical Presentation Stable/Uncomplicated   Clinical Presentation Rationale clinical judgement   Clinical Decision Making (Complexity) Low complexity   Therapy Frequency` daily   Predicted Duration of Therapy Intervention (days/wks) 3 days   Anticipated Equipment Needs at Discharge (TBD at next level of care)   Anticipated Discharge Disposition Transitional Care Facility   Risk & Benefits of therapy have been explained Yes   Patient, Family & other staff in agreement with plan of care Yes   [facility] TM \"6 Clicks\"   2016, Trustees of [facility], under license to [employer].  All rights reserved.   6 Clicks Short Forms Basic Mobility Inpatient Short Form   [facility]  \"6 Clicks\" V.2 Basic Mobility Inpatient Short Form   1. Turning from your back to your side while in a flat bed without using bedrails? 4 - None   2. Moving from lying on your back to sitting on the side of a flat bed without using bedrails? 3 - A Little   3. Moving to and from a bed to a chair (including a wheelchair)? 3 - A Little   4. Standing up from a chair using your arms (e.g., wheelchair, or bedside chair)? 3 - A Little   5. To walk in hospital room? 2 - A Lot   6. Climbing 3-5 steps with a railing? 2 - A Lot   Basic Mobility Raw Score (Score out of " "24.Lower scores equate to lower levels of function) 17   Total Evaluation Time   Total Evaluation Time (Minutes) 10[AC1.1]        Revision History        User Key Date/Time User Provider Type Action    > AC1.1 7/27/2017  1:41 PM Uma Johansen, PT Physical Therapist Sign            Progress Notes by Kenton Blackwood MD at 7/27/2017 12:36 PM     Author:  Kenton Blackwood MD Service:  Hospitalist Author Type:  Physician    Filed:  7/27/2017 12:46 PM Date of Service:  7/27/2017 12:36 PM Creation Time:  7/27/2017 12:36 PM    Status:  Signed :  Kenton Blackwood MD (Physician)         [facility]           Assessment and Plan:          Acute left facial droop, visual disturbance , reports of transient left hand weakness-- suspect acute CVA  7/26/17 -- -stop asa and start plavix, tele  -stop simvastatin and start atorvastin  -hold lisinopril (permissive hypertension)  7/27/2017 -- improving but still having symptoms.  Speech evaluation pending.  Echo pending.   Check US carotids as MRA could not adequately visualize these.      Acute hypoxia  7/27/2017 -- without respiratory failure.  Currently doing well but with new oxygen requirement.  Checking chest x-ray, VBG and dimer. Clear aspiration risk, but no clear evidence of pneumonia.  Wean oxygen as able.    htn  7/26/17 -- hold lisinopril  7/27/2017 -- continue to hold today as long as blood pressure remains below 220/120.       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     CKD--  7/26/17 -- recent creatinine rise this summer and recent hospitalization at [facility] for \"dehydration\". Creat currently at recent baseline  7/27/2017 -- improving      Restrictive lung disease      DVT Prophylaxis: mechanical     Code Status: Full " "Code    Disposition  Improving but still with symptoms present - ongoing evaluation as above and work-up for hypoxia - will need at least 1 more night inpatient.  Hope for return to TCU in 1-2 days if does well.              Interval History:   Feels better today but still has left sided mouth \"weakness and burning feeling\" with some drooling from that side of her mouth.  Speech better, but still has a hard time \"getting her words out\" at times.  No fever or chills. Long-standing weakness of left side extremities which is at baseline - no new weakness or arms or legs.  Vision at baseline - has long-standing \"black spots\" that have been extensive evaluated and that have been there sine birth.  No diplopia.  No new concerns.  Did drop her sats while resting to 80's, now on oxygen and feels good with this, no history of sleep apnea.            Review of Systems:    ROS: 10 point ROS neg other than the symptoms noted above in the HPI.             Medications:   Current active medications and PTA medications reviewed, see medication list for details.            Physical Exam:   Vitals were reviewed  Patient Vitals for the past 24 hrs:   BP Temp Temp src Pulse Heart Rate Resp SpO2 Height Weight   07/27/17 1205 176/68 - - 57 - 16 - - -   07/27/17 1200 - 98  F (36.7  C) Oral - - - - - -   07/27/17 1000 - - - - - - 94 % - -   07/27/17 0900 - 97.7  F (36.5  C) Oral - - - - - -   07/27/17 0807 142/57 - - - 51 16 96 % - -   07/27/17 0800 142/57 - - - - 16 93 % - -   07/27/17 0754 - - - - 49 - - - -   07/27/17 0500 - - - - - - - - 87.1 kg (192 lb 0.3 oz)   07/27/17 0319 139/55 97.6  F (36.4  C) Oral 57 - 18 93 % - -   07/27/17 0100 - - - - - - 95 % - -   07/27/17 0030 - - - - - - 95 % - -   07/27/17 0000 115/48 - - 50 50 16 93 % - -   07/26/17 2317 149/63 98  F (36.7  C) Oral - 52 16 92 % - -   07/26/17 2300 - - - - - - 94 % - -   07/26/17 2230 - - - - - - 93 % - -   07/26/17 2200 109/54 - - - 46 16 93 % - -   07/26/17 2000 " "135/73 98  F (36.7  C) Oral 56 56 20 93 % - -   17 1837 - - - - - - - 1.6 m (5' 3\") 86.5 kg (190 lb 11.2 oz)   17 1805 - - - - - - (!) 89 % - -   17 1800 179/74 97.9  F (36.6  C) Oral - 56 20 94 % - -   17 1730 - - - - 57 20 - - -   17 1700 - - - - 57 21 94 % - -   17 1615 - - - - 57 19 96 % - -   17 1545 185/87 - - - 55 15 97 % - -   17 1534 - - - - 55 14 96 % - -   17 1530 (!) 191/95 - - - 57 16 (!) 89 % - -   17 1500 169/83 - - - 56 10 92 % - -   17 1445 191/89 - - - 55 15 95 % - -   17 1430 196/85 - - - - - - - -   17 1420 (!) 207/101 - - - 57 - 96 % - -       Temperatures:  Current - Temp: 98  F (36.7  C); Max - Temp  Av.9  F (36.6  C)  Min: 97.6  F (36.4  C)  Max: 98  F (36.7  C)  Respiration range: Resp  Av.7  Min: 10  Max: 21  Pulse range: Pulse  Av  Min: 50  Max: 57  Blood pressure range: Systolic (24hrs), Av , Min:109 , Max:207   ; Diastolic (24hrs), Av, Min:48, Max:101    Pulse oximetry range: SpO2  Av.7 %  Min: 89 %  Max: 97 %  I/O last 3 completed shifts:  In: 600 [P.O.:600]  Out: 725 [Urine:725]    Intake/Output Summary (Last 24 hours) at 17 1236  Last data filed at 17 0800   Gross per 24 hour   Intake              950 ml   Output             1025 ml   Net              -75 ml     EXAM:   GENERAL APPEARANCE ADULT: Alert, no acute distress, oriented x 3  EYES: PERRL, EOM normal, conjunctiva and lids normal, EOM normal  HENT: oral mucous membranes moist, head atraumatic and normocephalic  NECK: No adenopathy,masses or thyromegaly, supple  CV: regular rate and rhythm no murmur  Pulm: clear to auscultation bilaterally  Abdomen: soft, non-tender, no masses, no masses, normal bowel sounds.  MS: extremities normal, no peripheral edema  SKIN: no suspicious lesions or rashes  NEURO: Alert, oriented, speech and mentation normal, some trace drooping of left side of mouth but appears clearly " improved from evaluation last night otherwise  Cranial nerves 2-12 are normal., Strength normal and symmetrical in upper and lower extremities other than mild long-standing left sided weakness which she confirms is baseline.  Sensation to light touch intact throughout upper and lower extremities bilaterally.   Reflexes 2+ and symmetrical at biceps, triceps, knees and ankles. Finger to nose and heel to shin testing is normal.  PSYCH: mentation appears normal, affect and mood normal             Data:[KK1.1]     Results for orders placed or performed during the hospital encounter of 07/26/17 (from the past 24 hour(s))   Head MRA w/o contrast - STROKE PROTOCOL    Narrative    MR ANGIOGRAM OF THE HEAD WITHOUT CONTRAST   7/26/2017 1:14 PM     HISTORY: Left facial droop. Stroke symptoms. Intermittent headache and  dizziness. Previous aneurysm clipping.    TECHNIQUE:  3D time-of-flight MR angiogram of the head without  contrast.    COMPARISON: 3/14/2008.    FINDINGS:  Artifacts are seen from the right middle cerebral artery  region aneurysm clip that obscure most of the right middle cerebral  artery and its branches. Internal carotid arteries and basilar and  vertebral arteries are patent with dominant left vertebral artery and  with tiny right vertebral artery ending in the PICA as a normal  variant. Posterior cerebral arteries are patent. Tiny saccular  aneurysm projecting superiorly off the left middle cerebral artery M1  segment measures 2.5 mm diameter, slightly larger than on the previous  exam when it measured 2.2 mm on the raw data images. No other new  aneurysm. Right anterior cerebral artery A1 segment is either absent  or hypoplastic.      Impression    IMPRESSION:  1. Minimal increase in size of the small saccular aneurysm projecting  superiorly off the left middle cerebral artery M1 segment, now 2.5 mm  diameter.  2. 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  3. No  evidence of arterial occlusion elsewhere.    HEIDY BERNARDO MD   MRA Neck w/o Contrast Angiogram    Narrative    MRA NECK WITHOUT CONTRAST  7/26/2017 1:49 PM     HISTORY: Left facial droop. Stroke symptoms.    TECHNIQUE: 2D time-of-flight MR angiogram of the neck without  contrast. Estimates of carotid stenoses are made relative to the  distal internal carotid artery diameters except as noted. Renal  insufficiency.    COMPARISON: None.    FINDINGS:    Right Carotid: Patent but tortuous. Artifacts from arterial pulsation  make it impossible to exclude stenoses.    Left Carotid:  Tortuous vessel. No definite stenosis in the  bifurcation. Poor visualization of the common carotid artery due to  pulsation artifacts.    Vertebral and Basilar:  Dominant left vertebral artery and tiny right  vertebral artery. Vessels are tortuous but patent. Cannot rule out  stenosis.      Impression    IMPRESSION:  Patent main vessels but artifacts from pulsation and  patient motion severely limit detail, making it impossible to exclude  stenoses.    HEIDY BERNARDO MD   MR Brain w/o Contrast    Narrative    MRI BRAIN WITHOUT CONTRAST  7/26/2017 1:49 PM    HISTORY:  Stroke with left facial droop.    TECHNIQUE:  Multiplanar, multisequence MRI of the brain without  gadolinium IV contrast material.  Renal insufficiency.    COMPARISON:  CT scan earlier today.    FINDINGS:  There is generalized atrophy of the brain. White matter  changes are seen in the cerebral hemispheres consistent with sequelae  of small vessel ischemic disease. Old lacunar infarct is seen in the  right corona radiata. There is no evidence of hemorrhage, mass, acute  infarct, or anomaly.  Old microhemorrhage is noted in the right  cerebellar hemisphere centrally. Artifacts from the right middle  cerebral artery region aneurysm clip obscure detail in the right  temporal lobe and inferior right frontal lobe.    There are bilateral intraocular lens implants. The arteries at  the  base of the brain and the dural venous sinuses appear patent.       Impression    IMPRESSION:    1. No evidence of acute infarct, acute hemorrhage or mass.  2. Brain atrophy and white matter changes consistent with sequelae of  small vessel ischemic disease.  3. Old lacunar infarct in the right corona radiata.  4. Old microhemorrhage in the right cerebellar hemisphere.  5. 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    HEIDY BERNARDO MD   CT Head Neck Angio w/o & w Contrast    Narrative    CT ANGIOGRAM OF THE HEAD AND NECK WITHOUT AND WITH CONTRAST  7/26/2017  2:44 PM     HISTORY: Left facial droop, possible stroke.    TECHNIQUE:  Precontrast localizing scans were followed by attempted CT  angiography with an injection of 3 mL Isovue 370 IV with scans through  the head and neck. However, the IV blew because of poor IV access and  the skin thus did not trigger. Subsequent iSTAT creatinine measurement  indicated GFR of 29, so the emergency physician, Dr. Robert Borjas, was  contacted and he decided to abort the rest of the exam.    FINDINGS: Noncontrast scans done for a subtraction mask show aneurysm  clip in the region of the right middle cerebral artery and right  pterional craniotomy with brain atrophy. There are bilateral  intraocular lens implants. Noncontrast scans of the neck show arterial  calcification in the region of the right carotid bifurcation and  otherwise are unremarkable.      Impression    IMPRESSION: Noncontrast scans only because the contrast-enhanced  component of the exam was aborted by Dr. Robert Borjas after renal  insufficiency became apparent.    HEIDY BERNARDO MD   UA reflex to Microscopic   Result Value Ref Range    Color Urine Light Yellow     Appearance Urine Clear     Glucose Urine Negative NEG mg/dL    Bilirubin Urine Negative NEG    Ketones Urine Negative NEG mg/dL    Specific Gravity Urine 1.008 1.003 - 1.035    Blood Urine Trace (A) NEG    pH  Urine 7.0 5.0 - 7.0 pH    Protein Albumin Urine Negative NEG mg/dL    Urobilinogen mg/dL Normal 0.0 - 2.0 mg/dL    Nitrite Urine Negative NEG    Leukocyte Esterase Urine Negative NEG    Source Catheterized Urine     RBC Urine 5 (H) 0 - 2 /HPF    WBC Urine 2 0 - 2 /HPF   Lactic acid whole blood   Result Value Ref Range    Lactic Acid 0.6 (L) 0.7 - 2.1 mmol/L   Chest XR,  PA & LAT    Narrative    XR CHEST 2 VW 7/26/2017 4:15 PM    HISTORY: Cough.    COMPARISON: None.    FINDINGS: Low lung volume. No airspace consolidation, pleural effusion  or pneumothorax. Benign granuloma in the left upper lung. Normal heart  size.      Impression    IMPRESSION: No acute abnormality.    JUVENTINO GORDON MD   Basic metabolic panel   Result Value Ref Range    Sodium 142 133 - 144 mmol/L    Potassium 4.5 3.4 - 5.3 mmol/L    Chloride 107 94 - 109 mmol/L    Carbon Dioxide 31 20 - 32 mmol/L    Anion Gap 4 3 - 14 mmol/L    Glucose 87 70 - 99 mg/dL    Urea Nitrogen 34 (H) 7 - 30 mg/dL    Creatinine 1.48 (H) 0.52 - 1.04 mg/dL    GFR Estimate 34 (L) >60 mL/min/1.7m2    GFR Estimate If Black 41 (L) >60 mL/min/1.7m2    Calcium 9.1 8.5 - 10.1 mg/dL   CBC with platelets   Result Value Ref Range    WBC 3.8 (L) 4.0 - 11.0 10e9/L    RBC Count 3.86 3.8 - 5.2 10e12/L    Hemoglobin 10.7 (L) 11.7 - 15.7 g/dL    Hematocrit 34.6 (L) 35.0 - 47.0 %    MCV 90 78 - 100 fl    MCH 27.7 26.5 - 33.0 pg    MCHC 30.9 (L) 31.5 - 36.5 g/dL    RDW 14.2 10.0 - 15.0 %    Platelet Count 147 (L) 150 - 450 10e9/L[KK1.2]           Attestation:  I have reviewed today's vital signs, notes, medications, labs and imaging.  Amount of time performed on this daily note: 30 minutes.     Kenton Blackwood MD, MD[KK1.1]                          Revision History        User Key Date/Time User Provider Type Action    > KK1.2 7/27/2017 12:46 PM Kenton Blackwood MD Physician Sign     KK1.1 7/27/2017 12:36 PM Kenton Blackwood MD Physician             Progress Notes by Christine Cardona, OT at  7/27/2017 12:00 PM     Author:  Christine Cardona OT Service:  (none) Author Type:  Occupational Therapist    Filed:  7/27/2017 12:00 PM Date of Service:  7/27/2017 12:00 PM Creation Time:  7/27/2017 12:00 PM    Status:  Signed :  Christine Cardona OT (Occupational Therapist)          07/27/17 1149   Quick Adds   Type of Visit Initial Occupational Therapy Evaluation   Living Environment   Living Environment Comment Pt originally from home with son. Now from TCU.    Functional Level Prior   Ambulation 1-->assistive equipment   Transferring 1-->assistive equipment   Toileting 1-->assistive equipment   Bathing 2-->assistive person   Dressing 2-->assistive person   Eating 0-->independent   Communication 0-->understands/communicates without difficulty   Swallowing 0-->swallows foods/liquids without difficulty   Cognition 0 - no cognition issues reported   Fall history within last six months yes   Number of times patient has fallen within last six months (describes 3 bad falls. )   Prior Functional Level Comment At baseline: pt is up with walker, has assist from friend for showers.    General Information   Onset of Illness/Injury or Date of Surgery - Date 07/26/17   Referring Physician Shamar Orellana MD   Patient/Family Goals Statement To return to TCU   Additional Occupational Profile Info/Pertinent History of Current Problem Acute left facial droop, visual disturbance , reports of transient left hand weakness--probable CVA   Cognitive Status Examination   Orientation orientation to person, place and time   Level of Consciousness alert   Able to Follow Commands WNL/WFL   Visual Perception   Visual Perception Wears glasses   Visual Perception Comments Pt denies any visual changes at this time. Able to read therapists name tag.    Pain Assessment   Patient Currently in Pain No   Range of Motion (ROM)   ROM Comment B UE ROM: B shouler FF- limited to ~90* d/t reported shoulder problems. Elbow and wrist ROM: WNL    Strength   Strength Comments B UE strength: Both grossly weak. Pt reports L feels weaker than R- difficult to tell as unable to fully complete MMT d/t shoulder pain.    Hand Strength   Hand Strength Comments B  strength: WFL and appear equal   Coordination   Upper Extremity Coordination Left UE impaired   Gross Motor Coordination L UE coordination impaired. L UE dysmetric (observed to touch mouth instead of nose).    Fine Motor Coordination finger to thumb L is equal to R and able to perform with good pace.    Transfer Skill: Bed to Chair/Chair to Bed   Level of [place]: Bed to Chair minimum assist (75% patients effort)   Physical Assist/Nonphysical Assist: Bed to Chair 1 person assist   Weight-Bearing Restrictions full weight-bearing   Assistive Device - Transfer Skill Bed to Chair Chair to Bed Rehab Eval standard walker   Transfer Skill: Sit to Stand   Level of [place]: Sit/Stand minimum assist (75% patients effort)   Physical Assist/Nonphysical Assist: Sit/Stand 1 person assist   Transfer Skill: Sit to Stand full weight-bearing   Assistive Device for Transfer: Sit/Stand standard walker   Transfer Skill: Toilet Transfer   Level of [place]: Toilet minimum assist (75% patients effort)   Physical Assist/Nonphysical Assist: Toilet 1 person assist   Weight-Bearing Restrictions: Toilet full weight-bearing   Assistive Device standard walker   Upper Body Dressing   Level of [place]: Dress Upper Body stand-by assist   Lower Body Dressing   Level of [place]: Dress Lower Body moderate assist (50% patients effort)   Physical Assist/Nonphysical Assist: Dress Lower Body 1 person assist   Eating/Self Feeding   Level of [place]: Eating independent   Activities of Daily Living Analysis   Impairments Contributing to Impaired Activities of Daily Living balance impaired;strength decreased   General Therapy Interventions   Planned Therapy Interventions ADL retraining   Clinical Impression   Criteria  "for Skilled Therapeutic Interventions Met yes, treatment indicated   OT Diagnosis decreased independence with ADLs and functional mobility   Influenced by the following impairments L UE weakness, generalized weakness   Assessment of Occupational Performance 1-3 Performance Deficits   Identified Performance Deficits dressing, toielting, showering   Clinical Decision Making (Complexity) Low complexity   Therapy Frequency daily   Predicted Duration of Therapy Intervention (days/wks) 2-3 days   Anticipated Discharge Disposition Transitional Care Facility   Risks and Benefits of Treatment have been explained. Yes   Patient, Family & other staff in agreement with plan of care Yes   [facility] AM-PAC  \"6 Clicks\" Daily Activity Inpatient Short Form   1. Putting on and taking off regular lower body clothing? 2 - A Lot   2. Bathing (including washing, rinsing, drying)? 3 - A Little   3. Toileting, which includes using toilet, bedpan or urinal? 3 - A Little   4. Putting on and taking off regular upper body clothing? 3 - A Little   5. Taking care of personal grooming such as brushing teeth? 4 - None   6. Eating meals? 4 - None   Daily Activity Raw Score (Score out of 24.Lower scores equate to lower levels of function) 19   Total Evaluation Time   Total Evaluation Time (Minutes) 15[LC1.1]        Revision History        User Key Date/Time User Provider Type Action    > LC1.1 7/27/2017 12:00 PM Christine Cardona OT Occupational Therapist Sign            Progress Notes by Isabelle Avery RN at 7/27/2017 10:46 AM     Author:  Isabelle Avery RN Service:  ICU Author Type:  Registered Nurse    Filed:  7/27/2017 10:47 AM Date of Service:  7/27/2017 10:46 AM Creation Time:  7/27/2017 10:46 AM    Status:  Signed :  Isabelle Avery, RN (Registered Nurse)         Pt soundly asleep. O2 saturation dropped to 87% Woke pt, instructed to take deep breaths,placed pt on 1 L nasal cannula. Saturation now 93%[JH1.1]     Revision History     "    User Key Date/Time User Provider Type Action    > JH1.1 7/27/2017 10:47 AM Isabelle Avery RN Registered Nurse Sign            Progress Notes by Mariajose Pardo RN at 7/26/2017  6:43 PM     Author:  Mariajose Pardo RN Service:  (none) Author Type:  Registered Nurse    Filed:  7/26/2017  6:45 PM Date of Service:  7/26/2017  6:43 PM Creation Time:  7/26/2017  6:43 PM    Status:  Signed :  Mariajose Pardo RN (Registered Nurse)         [facility] ADMISSION NOTE    Patient admitted to room 1001 at approximately 1820 via cart from emergency room. Patient was accompanied by nurse.     Verbal SBAR report received from Sabrina ELKINS prior to patient arrival.     Patient ambulated to bed with stand-by assist. Patient alert and oriented X 4. Pain in right hip (has been treated for this pain in past) 0-10 Pain Scale: 3. Admission vital signs: Blood pressure 179/74, temperature 97.9  F (36.6  C), temperature source Oral, resp. rate 20, SpO2 (!) 89 %. Patient was oriented to plan of care, bed controls, tv, telephone, bathroom and visiting hours.     The following safety risks were identified during admission: fall. Yellow risk band applied: YES.     Mariajose Pardo[LR1.1]       Revision History        User Key Date/Time User Provider Type Action    > LR1.1 7/26/2017  6:45 PM Mariajose Pardo RN Registered Nurse Sign            ED Notes by Jyotsna Solorzano RN at 7/26/2017  4:20 PM     Author:  Jyotsna Solorzano RN Service:  (none) Author Type:  Registered Nurse    Filed:  7/26/2017  5:44 PM Date of Service:  7/26/2017  4:20 PM Creation Time:  7/26/2017  5:44 PM    Status:  Signed :  Jyotsna Solorzano RN (Registered Nurse)         Pt back from xray. Pt has no c/o, family at bedside.[KF1.1]      Revision History        User Key Date/Time User Provider Type Action    > KF1.1 7/26/2017  5:44 PM Faragher, Jyotsna L, RN Registered Nurse Sign            ED Notes by Jyotsna Solorzano, RN at 7/26/2017  3:51 PM      Author:  Jyotsna Solorzano RN Service:  (none) Author Type:  Registered Nurse    Filed:  7/26/2017  3:52 PM Date of Service:  7/26/2017  3:51 PM Creation Time:  7/26/2017  3:52 PM    Status:  Signed :  Jyotsna Solorzano RN (Registered Nurse)         Received report from prev shift. Pt cont to have mild left facial droop, pt a/o, c/o headache and some nausea, medications had been given by prev shift.[KF1.1]      Revision History        User Key Date/Time User Provider Type Action    > KF1.1 7/26/2017  3:52 PM Jyotsna Solorzano RN Registered Nurse Sign            ED Notes by Sharlene Sadler RN at 7/26/2017  3:38 PM     Author:  Sharlene Sadler RN Service:  (none) Author Type:  Registered Nurse    Filed:  7/26/2017  3:43 PM Date of Service:  7/26/2017  3:38 PM Creation Time:  7/26/2017  3:39 PM    Status:  Addendum :  Sharlene Sadler RN (Registered Nurse)         Pt having low oxygen saturations on room air.  Placed on oxygen and notified Dr Borjas.[KS1.1]      Revision History        User Key Date/Time User Provider Type Action    > [N/A] 7/26/2017  3:43 PM Sharlene Sadler RN Registered Nurse Addend     KS1.1 7/26/2017  3:39 PM Sharlene Sadler RN Registered Nurse Sign            ED Notes by Sharlene Sadler RN at 7/26/2017  3:14 PM     Author:  Sharlene Sadler RN Service:  (none) Author Type:  Registered Nurse    Filed:  7/26/2017  3:38 PM Date of Service:  7/26/2017  3:14 PM Creation Time:  7/26/2017  3:38 PM    Status:  Signed :  Sharlene Sadler RN (Registered Nurse)         Pt complaining of headache in back of head.  Given Tylenol, water, offered something to eat.  Pt hadn't wanted anything to eat.  Now patient having nausea, spitting up saliva.  Pt just not feeling very well.  No change in neuro status.[KS1.1]     Revision History        User Key Date/Time User Provider Type Action    > KS1.1 7/26/2017  3:38 PM Sharlene Sadler, RN Registered Nurse Sign            ED Notes by Sharlene Sadler, SEVERO at 7/26/2017  11:37 AM     Author:  Sharlene Sadler RN Service:  (none) Author Type:  Registered Nurse    Filed:  7/26/2017  1:07 PM Date of Service:  7/26/2017 11:37 AM Creation Time:  7/26/2017  1:07 PM    Status:  Signed :  Sharlene Sadler RN (Registered Nurse)         Dr Borjas calling patient a code stroke.[KS1.1]      Revision History        User Key Date/Time User Provider Type Action    > KS1.1 7/26/2017  1:07 PM Sharlene Sadler RN Registered Nurse Sign            ED Notes by Sharlene Sadler RN at 7/26/2017 12:35 PM     Author:  Sharlene Sadler RN Service:  (none) Author Type:  Registered Nurse    Filed:  7/26/2017  1:05 PM Date of Service:  7/26/2017 12:35 PM Creation Time:  7/26/2017  1:05 PM    Status:  Signed :  Sharlene Sadler RN (Registered Nurse)         Pt states patient is no longer code stroke.[KS1.1]       Revision History        User Key Date/Time User Provider Type Action    > KS1.1 7/26/2017  1:05 PM Sharlene Sadler RN Registered Nurse Sign            ED Notes by Sharlene Sadler RN at 7/26/2017 11:23 AM     Author:  Sharlene Sadler RN Service:  (none) Author Type:  Registered Nurse    Filed:  7/26/2017 11:54 AM Date of Service:  7/26/2017 11:23 AM Creation Time:  7/26/2017 11:28 AM    Status:  Addendum :  Sharlene Sadler RN (Registered Nurse)         Pt started having blurred vision.  Pt having problems with word finding off and on[KS1.1] since yesterday[KS1.2].  Staff at nursing home and patient noted L facial droop starting 40 minutes before arrival.  Pt is alert and oriented[KS1.1] right now.  Equal strength in arms and legs.  L side of face has mild droop.  Pt continues to state that her vision just seems off on L side.  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  Denies any pain.[KS1.2]  Glucose done by EMS.  Dr Borjas in room at bedside.  Stroke eval called.[KS1.3]       Revision History        User Key Date/Time User Provider Type Action    > KS1.3 7/26/2017 11:54 AM Sharlene Sadler, RN Registered Nurse Addend      KS1.2 7/26/2017 11:51 AM Sharlene Sadler RN Registered Nurse Addend     KS1.1 7/26/2017 11:28 AM Sharlene Sadler RN Registered Nurse Sign            ED Notes by Sharlene Sadler RN at 7/26/2017 11:30 AM     Author:  Sharlene Sadler RN Service:  (none) Author Type:  Registered Nurse    Filed:  7/26/2017 11:49 AM Date of Service:  7/26/2017 11:30 AM Creation Time:  7/26/2017 11:49 AM    Status:  Signed :  Sharlene Sadler RN (Registered Nurse)         Labs drawn from IV and sent.[KS1.1]      Revision History        User Key Date/Time User Provider Type Action    > KS1.1 7/26/2017 11:49 AM Sharlene Sadler RN Registered Nurse Sign            ED Notes by Sharlene Sadler RN at 7/26/2017 11:20 AM     Author:  Sharlene Sadler RN Service:  (none) Author Type:  Registered Nurse    Filed:  7/26/2017 11:20 AM Date of Service:  7/26/2017 11:20 AM Creation Time:  7/26/2017 11:20 AM    Status:  Signed :  Sharlene Sadler RN (Registered Nurse)         Bed: ED03  Expected date:   Expected time:   Means of arrival:   Comments:     Revision History        User Key Date/Time User Provider Type Action    > KS1.1 7/26/2017 11:20 AM Sharlene Sadler RN Registered Nurse Sign                  Procedure Notes     No notes of this type exist for this encounter.         Progress Notes - Therapies (Notes from 07/25/17 through 07/28/17)      Progress Notes by Uma Johansen PT at 7/27/2017  1:41 PM     Author:  Uma Johansen, PT Service:  (none) Author Type:  Physical Therapist    Filed:  7/27/2017  1:41 PM Date of Service:  7/27/2017  1:41 PM Creation Time:  7/27/2017  1:41 PM    Status:  Signed :  Uma Johansen, PT (Physical Therapist)         Physical Therapy Evaluation:     07/27/17 1300   Quick Adds   Type of Visit Initial PT Evaluation       Present no   Living Environment   Lives With child(steven), adult   Living Arrangements house   Home Accessibility ramps present at home   Number of Stairs to Enter Home 0  "  Number of Stairs Within Home 0   Living Environment Comment At baseline, patient lives in a house with her son (son lives in basement). Son works ~8am-5pm Monday - Friday. Pt reports having friends/caregivers come daily except for Thursday where she is completely alone all day. Just started \"Mom's Meals\" but only got 3 meals prior to getting admitted to [facility]. Pt was admitted to hospital from [facility] where she hopes to return.   Self-Care   Usual Activity Tolerance moderate   Current Activity Tolerance fair   Regular Exercise other (see comments)   Activity/Exercise/Self-Care Comment pt was admitted from [facility] where she was getting PT and OT services   Functional Level Prior   Ambulation 1-->assistive equipment   Transferring 1-->assistive equipment   Toileting 1-->assistive equipment   Bathing 2-->assistive person   Dressing 2-->assistive person   Eating 0-->independent   Communication 0-->understands/communicates without difficulty   Swallowing 0-->swallows foods/liquids without difficulty   Cognition 0 - no cognition issues reported   Fall history within last six months yes   Number of times patient has fallen within last six months (numerous falls \"3 really bad ones\")   Which of the above functional risks had a recent onset or change? ambulation;transferring;fall history   Prior Functional Level Comment FWW at baseline; reports painful R-hip; no supplemental O2 at baseline   General Information   Onset of Illness/Injury or Date of Surgery - Date 07/26/17   Referring Physician Shamar Orellana MD   Patient/Family Goals Statement I want to go back to [facility]   Pertinent History of Current Problem (include personal factors and/or comorbidities that impact the POC) 84 year old female with a history of hypertension, hypokalemia, benign neoplasm of colon, COPD, arthrosclerosis of renal artery and osteoarthritis who presents by ambulance with a facial droop.    Precautions/Limitations fall precautions   General " Info Comments Patient received supine in bed and agreeable to PT evaluation/session. Pleasant but reports being exhausted. On 1LPM via nasal cannula with Sp02 = 96%   Cognitive Status Examination   Orientation orientation to person, place and time   Level of Consciousness alert   Follows Commands and Answers Questions 100% of the time   Personal Safety and Judgment intact   Memory intact   Pain Assessment   Patient Currently in Pain (R-hip pain/discomfort but didn't rate numerically)   Posture    Posture Not impaired   Range of Motion (ROM)   ROM Quick Adds No deficits were identified   ROM Comment BLE hip flex, knee flex/ext, DF/PF WFL   Strength   Strength Comments functional LE strength 3+ to 4-/5   Bed Mobility   Bed Mobility Comments SBA for supine to sit   Transfer Skills   Transfer Comments CGA for STS up to FWW   Gait   Gait Comments 3-4 steps only using FWW to transfer from bed to chair at CGA; pt reports minimal activity was fatiguing    Balance   Balance Comments fairly good static standing balance with BUEs onto FWW at CGA for safety due to initial reports of dizziness upon standing   Sensory Examination   Sensory Perception no deficits were identified   Sensory Perception Comments BLE light touch grossly intact   Coordination   Coordination no deficits were identified   Muscle Tone   Muscle Tone no deficits were identified   General Therapy Interventions   Planned Therapy Interventions balance training;bed mobility training;gait training;strengthening;transfer training;progressive activity/exercise   Clinical Impression   Criteria for Skilled Therapeutic Intervention yes, treatment indicated   PT Diagnosis deconditioning/weakness   Influenced by the following impairments decreased activity tolerance, LE weakness, fall risk   Functional limitations due to impairments bed mobility, transfers, ambulation and stair negotiation   Clinical Presentation Stable/Uncomplicated   Clinical Presentation Rationale  "clinical judgement   Clinical Decision Making (Complexity) Low complexity   Therapy Frequency` daily   Predicted Duration of Therapy Intervention (days/wks) 3 days   Anticipated Equipment Needs at Discharge (TBD at next level of care)   Anticipated Discharge Disposition Transitional Care Facility   Risk & Benefits of therapy have been explained Yes   Patient, Family & other staff in agreement with plan of care Yes   [facility] TM \"6 Clicks\"   2016, Trustees of [facility], under license to [employer].  All rights reserved.   6 Clicks Short Forms Basic Mobility Inpatient Short Form   [facility] AM-PAC  \"6 Clicks\" V.2 Basic Mobility Inpatient Short Form   1. Turning from your back to your side while in a flat bed without using bedrails? 4 - None   2. Moving from lying on your back to sitting on the side of a flat bed without using bedrails? 3 - A Little   3. Moving to and from a bed to a chair (including a wheelchair)? 3 - A Little   4. Standing up from a chair using your arms (e.g., wheelchair, or bedside chair)? 3 - A Little   5. To walk in hospital room? 2 - A Lot   6. Climbing 3-5 steps with a railing? 2 - A Lot   Basic Mobility Raw Score (Score out of 24.Lower scores equate to lower levels of function) 17   Total Evaluation Time   Total Evaluation Time (Minutes) 10[AC1.1]        Revision History        User Key Date/Time User Provider Type Action    > AC1.1 7/27/2017  1:41 PM Uma Johansen, PT Physical Therapist Sign            Progress Notes by Christine Cardona OT at 7/27/2017 12:00 PM     Author:  Christine Cardona OT Service:  (none) Author Type:  Occupational Therapist    Filed:  7/27/2017 12:00 PM Date of Service:  7/27/2017 12:00 PM Creation Time:  7/27/2017 12:00 PM    Status:  Signed :  Christine Cardona OT (Occupational Therapist)          07/27/17 1149   Quick Adds   Type of Visit Initial Occupational Therapy Evaluation   Living Environment   Living Environment " Comment Pt originally from home with son. Now from TCU.    Functional Level Prior   Ambulation 1-->assistive equipment   Transferring 1-->assistive equipment   Toileting 1-->assistive equipment   Bathing 2-->assistive person   Dressing 2-->assistive person   Eating 0-->independent   Communication 0-->understands/communicates without difficulty   Swallowing 0-->swallows foods/liquids without difficulty   Cognition 0 - no cognition issues reported   Fall history within last six months yes   Number of times patient has fallen within last six months (describes 3 bad falls. )   Prior Functional Level Comment At baseline: pt is up with walker, has assist from friend for showers.    General Information   Onset of Illness/Injury or Date of Surgery - Date 07/26/17   Referring Physician Shamar Orellana MD   Patient/Family Goals Statement To return to TCU   Additional Occupational Profile Info/Pertinent History of Current Problem Acute left facial droop, visual disturbance , reports of transient left hand weakness--probable CVA   Cognitive Status Examination   Orientation orientation to person, place and time   Level of Consciousness alert   Able to Follow Commands WNL/WFL   Visual Perception   Visual Perception Wears glasses   Visual Perception Comments Pt denies any visual changes at this time. Able to read therapists name tag.    Pain Assessment   Patient Currently in Pain No   Range of Motion (ROM)   ROM Comment B UE ROM: B sarah FF- limited to ~90* d/t reported shoulder problems. Elbow and wrist ROM: WNL   Strength   Strength Comments B UE strength: Both grossly weak. Pt reports L feels weaker than R- difficult to tell as unable to fully complete MMT d/t shoulder pain.    Hand Strength   Hand Strength Comments B  strength: WFL and appear equal   Coordination   Upper Extremity Coordination Left UE impaired   Gross Motor Coordination L UE coordination impaired. L UE dysmetric (observed to touch mouth instead of  nose).    Fine Motor Coordination finger to thumb L is equal to R and able to perform with good pace.    Transfer Skill: Bed to Chair/Chair to Bed   Level of [place]: Bed to Chair minimum assist (75% patients effort)   Physical Assist/Nonphysical Assist: Bed to Chair 1 person assist   Weight-Bearing Restrictions full weight-bearing   Assistive Device - Transfer Skill Bed to Chair Chair to Bed Rehab Eval standard walker   Transfer Skill: Sit to Stand   Level of [place]: Sit/Stand minimum assist (75% patients effort)   Physical Assist/Nonphysical Assist: Sit/Stand 1 person assist   Transfer Skill: Sit to Stand full weight-bearing   Assistive Device for Transfer: Sit/Stand standard walker   Transfer Skill: Toilet Transfer   Level of [place]: Toilet minimum assist (75% patients effort)   Physical Assist/Nonphysical Assist: Toilet 1 person assist   Weight-Bearing Restrictions: Toilet full weight-bearing   Assistive Device standard walker   Upper Body Dressing   Level of [place]: Dress Upper Body stand-by assist   Lower Body Dressing   Level of [place]: Dress Lower Body moderate assist (50% patients effort)   Physical Assist/Nonphysical Assist: Dress Lower Body 1 person assist   Eating/Self Feeding   Level of [place]: Eating independent   Activities of Daily Living Analysis   Impairments Contributing to Impaired Activities of Daily Living balance impaired;strength decreased   General Therapy Interventions   Planned Therapy Interventions ADL retraining   Clinical Impression   Criteria for Skilled Therapeutic Interventions Met yes, treatment indicated   OT Diagnosis decreased independence with ADLs and functional mobility   Influenced by the following impairments L UE weakness, generalized weakness   Assessment of Occupational Performance 1-3 Performance Deficits   Identified Performance Deficits dressing, toielting, showering   Clinical Decision Making (Complexity) Low complexity   Therapy  "Frequency daily   Predicted Duration of Therapy Intervention (days/wks) 2-3 days   Anticipated Discharge Disposition Transitional Care Facility   Risks and Benefits of Treatment have been explained. Yes   Patient, Family & other staff in agreement with plan of care Yes   [facility] AM-PAC  \"6 Clicks\" Daily Activity Inpatient Short Form   1. Putting on and taking off regular lower body clothing? 2 - A Lot   2. Bathing (including washing, rinsing, drying)? 3 - A Little   3. Toileting, which includes using toilet, bedpan or urinal? 3 - A Little   4. Putting on and taking off regular upper body clothing? 3 - A Little   5. Taking care of personal grooming such as brushing teeth? 4 - None   6. Eating meals? 4 - None   Daily Activity Raw Score (Score out of 24.Lower scores equate to lower levels of function) 19   Total Evaluation Time   Total Evaluation Time (Minutes) 15[LC1.1]        Revision History        User Key Date/Time User Provider Type Action    > LC1.1 7/27/2017 12:00 PM Christine Cardona OT Occupational Therapist Sign            "

## 2017-07-26 NOTE — LETTER
Transition Communication Hand-off for Care Transitions to Next Level of Care Provider    Name: Jazmin Clifton  MRN #: [id number]  Reason for Hospitalization:  Facial droop [R29.810]  Admit Date/Time: 7/26/2017 11:20 AM  Discharge Date: 7/28/17      Reason for Communication Hand-off Referral: Fragility  Difficulty understanding plan of care      Discharge Plan:  Discharged to: TCU: [facility]      Patient agreeable to post-hospital support suggestions:  Yes  Discharge Plan:             Patient is on new medications:   Yes           MTM follow up recommended: Yes           Tel-[employer] program:  Reserve option to present at more appropriate time           Patient will receive  TCU  at:  [facility]      Follow-up specialty is recommended: No        Follow-up plan:  No future appointments. To be seen by  Geriatrics      Any outstanding tests or procedures:                Key Recommendations:  Patient has been working with the [facility] ([facility]) for MA benefits. She is on the waiting list for 4 ALFs in the region. She knows that she needs more assistance at home. Home Care would be beneficial when she dc's from the TCU.      Anjelica Valiente    AVS/Discharge Summary is the source of truth; this is a helpful guide for improved communication of patient story

## 2017-07-26 NOTE — IP AVS SNAPSHOT
` `     [facility] INTENSIVE CARE: [phone]            Medication Administration Report for Jazmin Clifton as of 07/28/17 9524   Legend:    Given Hold Not Given Due Canceled Entry Other Actions    Time Time (Time) Time  Time-Action       Inactive    Active    Linked        Medications 07/22/17 07/23/17 07/24/17 07/25/17 07/26/17 07/27/17 07/28/17    acetaminophen (TYLENOL) tablet 1,000 mg  Dose: 1,000 mg Freq: 3 TIMES DAILY Route: PO  Start: 07/26/17 2000   Admin Instructions: Maximum acetaminophen dose from all sources = 75 mg/kg/day not to exceed 4 gram         2000 (1,000 mg)-Given        0839 (1,000 mg)-Given       (1406)-Not Given       2009 (1,000 mg)-Given        0905 (1,000 mg)-Given       (1418)-Not Given       [ ] 2000           atorvastatin (LIPITOR) tablet 40 mg  Dose: 40 mg Freq: DAILY Route: PO  Start: 07/26/17 1800        2001 (40 mg)-Given        0839 (40 mg)-Given        0905 (40 mg)-Given           calcium carb 1250 mg (500 mg [place])/vitamin D 200 units (OSCAL with D) per tablet 1 tablet  Dose: 1 tablet Freq: DAILY Route: PO  Start: 07/26/17 1800        2001 (1 tablet)-Given        0839 (1 tablet)-Given        0905 (1 tablet)-Given           citalopram (celeXA) tablet 20 mg  Dose: 20 mg Freq: DAILY Route: PO  Start: 07/27/17 0800         0839 (20 mg)-Given        0905 (20 mg)-Given           clopidogrel (PLAVIX) tablet 75 mg  Dose: 75 mg Freq: DAILY Route: PO  Start: 07/26/17 1800        2002 (75 mg)-Given [C]        0839 (75 mg)-Given        0905 (75 mg)-Given           cyanocolbalamin (vitamin  B-12) tablet 1,000 mcg  Dose: 1,000 mcg Freq: DAILY Route: PO  Start: 07/27/17 0800         0838 (1,000 mcg)-Given        0907 (1,000 mcg)-Given           docusate sodium (COLACE) capsule 100 mg  Dose: 100 mg Freq: DAILY Route: PO  Start: 07/26/17 1800        2002 (100 mg)-Given        0839 (100 mg)-Given        0905 (100 mg)-Given           fluticasone-vilanterol (BREO ELLIPTA) 100-25 MCG/INH oral  inhaler 1 puff  Dose: 1 puff Freq: DAILY Route: IN  Start: 07/26/17 1830   Admin Instructions: Rinse mouth after use.<br>Therapeutic substitution for Symbicort while in Hospital.<br>         2003 (1 puff)-Given [C]        0841 (1 puff)-Given        0908 (1 puff)-Given           furosemide (LASIX) tablet 20 mg  Dose: 20 mg Freq: 2 TIMES DAILY Route: PO  Start: 07/26/17 2000 2003 (20 mg)-Given        0838 (20 mg)-Given       2009 (20 mg)-Given        0905 (20 mg)-Given       [ ] 2000           gabapentin (NEURONTIN) capsule 300 mg  Dose: 300 mg Freq: 2 TIMES DAILY Route: PO  Start: 07/26/17 2000 2003 (300 mg)-Given        0839 (300 mg)-Given       2009 (300 mg)-Given        0905 (300 mg)-Given       [ ] 2000           isosorbide mononitrate (IMDUR) 24 hr tablet 30 mg  Dose: 30 mg Freq: DAILY Route: PO  Start: 07/27/17 0800   Admin Instructions: DO NOT CRUSH. Can split tablet in half along score robe.          0838 (30 mg)-Given        0907 (30 mg)-Given           lisinopril (PRINIVIL/ZESTRIL) tablet 20 mg  Dose: 20 mg Freq: DAILY Route: PO  Start: 07/28/17 1530          [ ] 1530           magnesium oxide (MAG-OX) half-tab 200 mg  Dose: 200 mg Freq: DAILY Route: PO  Start: 07/27/17 0800   Admin Instructions: Therapeutic substitution for the Magnesium 250 mg product while in Hospital.<br>          0839 (200 mg)-Given        0907 (200 mg)-Given           memantine (NAMENDA) tablet 5 mg  Dose: 5 mg Freq: DAILY Route: PO  Start: 07/27/17 0800         0839 (5 mg)-Given        0906 (5 mg)-Given           naloxone (NARCAN) injection 0.1-0.4 mg  Dose: 0.1-0.4 mg Freq: EVERY 2 MIN PRN Route: IV  PRN Reason: opioid reversal  Start: 07/26/17 1755   Admin Instructions: For respiratory rate LESS than or EQUAL to 8.  Partial reversal dose:  0.1 mg titrated q 2 minutes for Analgesia Side Effects Monitoring Sedation Level of 3 (frequently drowsy, arousable, drifts to sleep during conversation).Full reversal dose:  0.4 mg  bolus for Analgesia Side Effects Monitoring Sedation Level of 4 (somnolent, minimal or no response to stimulation).               omeprazole (priLOSEC) CR capsule 20 mg  Dose: 20 mg Freq: EVERY MORNING Route: PO  Start: 07/27/17 0800         0838 (20 mg)-Given        0905 (20 mg)-Given           ondansetron (ZOFRAN-ODT) ODT tab 4 mg  Dose: 4 mg Freq: EVERY 6 HOURS PRN Route: PO  PRN Reasons: nausea,vomiting  Start: 07/26/17 1755   Admin Instructions: This is Step 1 of nausea and vomiting management.  If nausea not resolved in 15 minutes, go to Step 2 prochlorperazine (COMPAZINE). Do not push through foil backing. Peel back foil and gently remove. Place on tongue immediately. Administration with liquid unnecessary              Or  ondansetron (ZOFRAN) injection 4 mg  Dose: 4 mg Freq: EVERY 6 HOURS PRN Route: IV  PRN Reasons: nausea,vomiting  Start: 07/26/17 1755   Admin Instructions: This is Step 1 of nausea and vomiting management.  If nausea not resolved in 15 minutes, go to Step 2 prochlorperazine (COMPAZINE).  Irritant.               oxyCODONE (ROXICODONE) IR half-tab 2.5 mg  Dose: 2.5 mg Freq: EVERY 4 HOURS PRN Route: PO  PRN Comment: pain  Start: 07/26/17 1755          0023 (2.5 mg)-Given       0530 (2.5 mg)-Given           potassium chloride (KLOR-CON) Packet 10 mEq  Dose: 10 mEq Freq: DAILY Route: PO  Start: 07/27/17 0800   Admin Instructions: Dissolve packet contents in 4-8 ounces of cold water or juice.          0839 (10 mEq)-Given        0904 (10 mEq)-Given           sodium chloride (PF) 0.9% PF flush 3 mL  Dose: 3 mL Freq: EVERY 8 HOURS Route: IK  Start: 07/27/17 0015         0040 (3 mL)-Given       0841 (3 mL)-Given       1746 (3 mL)-Given        (0135)-Not Given       0906 (3 mL)-Given       [ ] 1615           trimethoprim (TRIMPEX) tablet 100 mg  Dose: 100 mg Freq: DAILY Route: PO  Indications of Use: URINARY TRACT INFECTION  Start: 07/27/17 0800         0840 (100 mg)-Given        0907 (100  mg)-Given           umeclidinium (INCRUSE ELLIPTA) 62.5 MCG/INH oral inhaler 18 mcg  Dose: 18 mcg Freq: DAILY Route: IN  Start: 07/27/17 0800   Admin Instructions: Use only ONE capsule. To ensure the full dose is administered, TWO inhalations should be performed at a rate sufficient to hear or feel the capsule vibrate.<br><br>Therapeutic substitution for Spiriva while in Hospital.<br>          0840 (18 mcg)-Given        0909 (18 mcg)-Given          Future Medications  Medications 07/22/17 07/23/17 07/24/17 07/25/17 07/26/17 07/27/17 07/28/17         Dose: 20 mg Freq: AT BEDTIME Route: PO  Start: 07/28/17 2200   End: 07/28/17 1533          1533-Med Discontinued       simvastatin (ZOCOR) tablet 10 mg  Dose: 10 mg Freq: AT BEDTIME Route: PO  Start: 07/28/17 2200   Admin Instructions: Auto sub for 20 mg lovastatin           [ ] 2200          Completed Medications  Medications 07/22/17 07/23/17 07/24/17 07/25/17 07/26/17 07/27/17 07/28/17         Dose: 500 mL Freq: ONCE Route: IV  Last Dose: Stopped (07/26/17 1245)  Start: 07/26/17 1136   End: 07/26/17 1426        1210 (500 mL)-New Bag [C]       1245-Paused       1426-Stopped               Dose: 650 mg Freq: ONCE Route: PO  Start: 07/26/17 1458   End: 07/26/17 1506   Admin Instructions: Maximum acetaminophen dose from all sources = 75 mg/kg/day not to exceed 4 grams/day.         1506 (650 mg)-Given               Dose: 5,500 Units Freq: ONCE Route: IV  Start: 07/27/17 1700   End: 07/27/17 1745   Admin Instructions: Nurse to administer dose from existing infusion. If no infusion bag or syringe for this order is available, contact pharmacist to re-enter medication order.          1745 (5,500 Units)-Given              Dose: 70 mL Freq: ONCE Route: IV  Start: 07/26/17 1438   End: 07/26/17 1439        1439 (35 mL)-Given [C]               Dose: 4 mg Freq: ONCE Route: IV  Start: 07/26/17 1514   End: 07/26/17 1517   Admin Instructions: Irritant.         1515 (4 mg)-Given                Dose: 2 mL Freq: ONCE Route: IV  Start: 07/27/17 1200   End: 07/27/17 1157         1157 (2 mL)-Given              Dose: 10 mL Freq: ONCE Route: IV  Start: 07/27/17 1200   End: 07/27/17 1157         1157 (10 mL)-Given              Dose: 65 millicurie Freq: ONCE Route: IN  Start: 07/27/17 1915   End: 07/27/17 1912   Admin Instructions: Supplied by, and administered by Nuclear Medicine.  *HW*          1912 (50.6 millicurie)-Given              Dose: 3 millicurie Freq: ONCE Route: IV  Start: 07/27/17 1915   End: 07/27/17 1934   Admin Instructions: Radioisotope, supplied by and administered by Nuclear Medicine.  *HW*          1934 (4.8 millicurie)-Given           Discontinued Medications  Medications 07/22/17 07/23/17 07/24/17 07/25/17 07/26/17 07/27/17 07/28/17         Dose: 500 mL Freq: ONCE Route: IV  Start: 07/26/17 1448   End: 07/26/17 1458               1458-Med Discontinued           Rate: 125 mL/hr Freq: CONTINUOUS Route: IV  Last Dose: Stopped (07/26/17 1809)  Start: 07/26/17 1448   End: 07/26/17 1755   Admin Instructions: Administer after the bolus/s         1504 (500 mL)-Rate/Dose Change [C]       1755-Med Discontinued  1809-Stopped               Rate: 10.5 mL/hr Freq: CONTINUOUS Route: IV  Last Dose: Stopped (07/28/17 1225)  Start: 07/28/17 0915   End: 07/28/17 1224          0917 (16 Units/kg/hr)-Restarted       1224-Med Discontinued  1225-Stopped             Rate: 12 mL/hr Freq: CONTINUOUS Route: IV  Last Dose: Stopped (07/28/17 0817)  Start: 07/27/17 1700   End: 07/28/17 0819         1741 (18 Units/kg/hr)-New Bag       1800 (18 Units/kg/hr)-Rate/Dose Verify       1900 (18 Units/kg/hr)-Rate/Dose Verify        0531 (18 Units/kg/hr)-Rate/Dose Verify       0800 (18 Units/kg/hr)-Rate/Dose Verify       0817-Stopped       0819-Med Discontinued         Dose: 70 mL Freq: ONCE Route: IV  Start: 07/26/17 1140   End: 07/26/17 1405        (1203)-Not Given [C]       1405-Med Discontinued           Dose: 100 mL Freq:  ONCE Route: IV  Start: 07/26/17 1139   End: 07/26/17 1405               1405-Med Discontinued      Medications 07/22/17 07/23/17 07/24/17 07/25/17 07/26/17 07/27/17 07/28/17

## 2017-07-26 NOTE — H&P
"[facility]    History and Physical  Hospital Medicine       Date of Admission:  7/26/2017  Date of Service: 7/26/2017     Assessment & Plan      Acute left facial droop, visual disturbance , reports of transient left hand weakness--probable CVA  -tele  -echo  -stop asa and start plavix  -stop simvastatin and start atorvastin  -hold lisinopril (permissive hypertension)    htn  -hold lisinopril    Previous right MCA aneurysm clipping with known left MCA aneurysm under surveillance and minimally changed    MRI suggests previous lacunar stroke    gerd    Diastolic CHF- compensated    CKD--recent creatinine rise this summer and recent hospitalization at [facility] for \"dehydration\". Creat currently at recent baseline    Restrictive lung ds.    Prophylaxis-SCD's        DVT Prophylaxis: Anti-embolisim stockings (TEDs)  Code Status: Full Code    Disposition: Anticipate discharge in 2 day(s). Appropriate for inpatient care.    Shamar Orellana MD        History is obtained from the patient and review of old records via the EMR.    Past Medical History      Past Medical History:   Diagnosis Date     ABDOMINAL PAIN GENERALIZED 3/15/2006    1 month of abdominal pain that llqwhich radiates into her back and chest.  Pt was hospitilzed 2x for the pain at [facility] --pt hopsitized for 3 days.  Rcords not ab=vailable.  She was told either \" twisted intestine or blockage of bowel.  Pt feels it is the hiatal hernia.  Pt hospitilized again a second time  A month ago.  Ct scans x 2 showed \" nothing.\"  Pt had a barium and xrays.  Pt sa     Abdominal pain, generalized 3/15/2006    1 month of abdominal pain that llqwhich radiates into her back and chest.  Pt was hospitilzed 2x for the pain at [facility] --pt hopsitized for 3 days.  Rcords not ab=vailable.  She was told either \" twisted intestine or blockage of bowel.  Pt feels it is the hiatal hernia.  Pt hospitilized again a second time  A month " "ago.  Ct scans x 2 showed \" nothing.\"  Pt had a barium and xrays.  Pt sa     Atherosclerosis of renal artery (H)     Left renal artery stenosis     BENIGN HYPERTENSION 5/1/2006 5/1/2006   Increase catapres to 0.3 mg and decrease clonidine to 0.1 mg daily.  Recheck bp in 1 month.      Benign neoplasm of scalp and skin of neck     Seborrheic keratosis     Cardiac dysrhythmias NEC     Bradycardia, improved on lower dose beta blockers      Cerebral aneurysm, nonruptured     Cerebral Aneurysm     Depressive disorder, not elsewhere classified     Depression     Esophageal reflux     Gastroesophageal Reflux Disease     Female stress incontinence      Gastrointestinal malfunction arising from mental factors     Dyspepsia     Generalized osteoarthrosis, unspecified site     DJD-chronic back pain     Herpes zoster dermatitis of eyelid      Insomnia, unspecified      Lumbago     Chronic back pain     Other chest pain     Atypical Chest Pain     Rectocele     Grade 3     Unspecified disorder of kidney and ureter     Renal insufficiency     Unspecified essential hypertension        Past Surgical History     Past Surgical History:   Procedure Laterality Date     CHOLECYSTECTOMY, LAPOROSCOPIC  1997    Cholecystectomy, Laparoscopic     HYSTERECTOMY, RAYMOND  1982    oophorectomy,RAYMOND     SURGICAL HISTORY OF -       Laminectomy x 3     SURGICAL HISTORY OF -       MCA Aneurysm repair     SURGICAL HISTORY OF -   1996    Bladder suspension (Bladder Repair x2)     SURGICAL HISTORY OF -       Bilateral Hand Surgeries for Arthritis x2     SURGICAL HISTORY OF -       Repair of a Cerebral Aneurysm        History of Present Illness   Jazmin Clifton is a 84 year old female with the above past medical history now presents with a new left facial droop.The patient apparently had similar symptoms several weeks ago and was evaluated in the emergency room at [facility]. She was discharged from the emergency room. Subsequent to " that she developed an episode that she describes it as dehydration that was associated with low blood pressure. She was again hospitalized in [place]. After this hospitalization she was in the TCU [facility].  She was there until this morning.    In the past 2 days she reports episodes where she's had trouble getting the right words out of her mouth. Her son thinks that this has been more of a chronic intermittent problem.She was in her room this morning when she developed a left facial droop associated with some drooling. She also had some type of vague visual disturbancethat she characterized as printed words jumping out at her. She went on to develop some transient left hand weakness. She had no numbness. She does report  a burning feeling in her left face.     Currently her only symptom is some drooping of her left mouth associated with some drooling. She was evaluated in the emergency room.A CTA was not done due to abnormal renal function She did have an MRI and MRA  Done which showed no acute findings.. 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 An old right corona radiata lacunar infarct was noted on MRI. The patient is currently on aspirin and low-dose simvastatin.    At this time I am going to admit the patient, put her on telemetry, stop the aspirin, start Plavix, stop simvastatin and start high-dose atorvastatin.  did talk to the [facility] stroke team. I will also order an echo. Will hold lisinopril for permissive hypertension.    Prior to Admission Medications   Prior to Admission Medications   Prescriptions Last Dose Informant Patient Reported? Taking?   ASPIRIN PO 7/26/2017 at am Nursing Home Yes Yes   Sig: Take 81 mg by mouth daily   Acetaminophen (TYLENOL PO) 7/26/2017 at am Nursing Home Yes Yes   Sig: Take 1,000 mg by mouth 3 times daily    Benzocaine-Glycerin-DM (CEPACOL DUAL  RELIEF PO) Unknown at Unknown time care home Yes No   Sig: Give 1 dose by mouth every 2 hours as needed for Sore throat Per RSO - package instructions 1 lozenge Q 2 hours.   Citalopram Hydrobromide (CELEXA PO) 7/26/2017 at am Nursing Home Yes Yes   Sig: Take 20 mg by mouth daily    Docusate Sodium (COLACE PO) Unknown at Unknown time care home Yes No   Sig: Take 100 mg by mouth daily   Furosemide (LASIX PO) 7/26/2017 at 0800 Nursing Home Yes Yes   Sig: Take 20 mg by mouth 2 times daily    Gabapentin (NEURONTIN PO) 7/26/2017 at am Nursing Home Yes Yes   Sig: Take 300 mg by mouth 2 times daily    LISINOPRIL PO 7/26/2017 at am Nursing Home Yes Yes   Sig: Take 20 mg by mouth daily    Lactobacillus (ACIDOPHILUS PO) 7/26/2017 at am Nursing Home Yes Yes   Sig: Take 1 capsule by mouth 2 times daily   Lidocaine-Menthol (ICY HOT LIDOCAINE PLUS MENTHOL EX) 7/26/2017 at am Nursing Home Yes Yes   Sig: Externally apply topically 3 times daily Apply to hips   MAGNESIUM OXIDE PO 7/26/2017 at am Nursing Home Yes Yes   Sig: Take 250 mg by mouth daily   Memantine HCl (NAMENDA PO) 7/26/2017 at am Nursing Home Yes Yes   Sig: Take 5 mg by mouth daily   OXYCODONE HCL PO 7/26/2017 at 0700 Nursing Home Yes Yes   Sig: Take 2.5 mg by mouth every 4 hours as needed Give 2.5 mg po Q 4 hrs prn pain   Omeprazole (PRILOSEC PO) 7/26/2017 at 0700 Nursing Home Yes Yes   Sig: Take 20 mg by mouth every morning   Simvastatin (ZOCOR PO) 7/25/2017 at  Nursing Home Yes Yes   Sig: Take 10 mg by mouth At Bedtime   TRIMETHOPRIM PO 7/26/2017 at am Nursing Home Yes Yes   Sig: Take 100 mg by mouth daily Give 1 tablet by mouth one time a day for preventative for UTI   budesonide-formoterol (SYMBICORT) 80-4.5 MCG/ACT Inhaler 7/26/2017 at am Nursing Home Yes Yes   Sig: Inhale 2 puffs into the lungs 2 times daily   calcium carb 1250 mg, 500 mg [place],/vitamin D 200 units (OSCAL WITH D) 500-200 MG-UNIT per tablet 7/26/2017 at am Nursing Home Yes Yes   Sig: Take 1  tablet by mouth daily   cyanocobalamin (VITAMIN  B-12) 1000 MCG tablet 7/26/2017 at am Nursing Home Yes Yes   Sig: Take 1,000 mcg by mouth daily   isosorbide mononitrate (IMDUR) 30 MG 24 hr tablet 7/26/2017 at am Nursing Home Yes Yes   Sig: Take 30 mg by mouth daily   nystatin (MYCOSTATIN) 520314 UNIT/GM POWD 7/26/2017 at am Nursing Home Yes Yes   Sig: Apply topically 2 times daily   potassium chloride (KLOR-CON) 20 MEQ Packet 7/26/2017 at am Nursing Home Yes Yes   Sig: Take 10 mEq by mouth daily Give 10 mEq by mouth one time a day for supplement   tiotropium (SPIRIVA) 18 MCG capsule 7/26/2017 at am Nursing Home Yes Yes   Sig: Inhale 18 mcg into the lungs daily      Facility-Administered Medications: None     Allergies   Allergies   Allergen Reactions     Accupril      Ace Inhibitors      Accupril     Augmentin      Levofloxacin      Morphine      Norvasc [Amlodipine Besylate]      Leg swelling         Family History    Family History   Problem Relation Age of Onset     CANCER Mother      stomache     CEREBROVASCULAR DISEASE Father      HEART DISEASE Father      CANCER Brother      lymphoma     Eye Disorder Son      Asthma Son      DIABETES Son      GASTROINTESTINAL DISEASE Son      no control over bladder or bowels     C.A.D. No family hx of      Hypertension No family hx of      Breast Cancer No family hx of      Cancer - colorectal No family hx of      Prostate Cancer No family hx of        Social History   Social History     Social History     Marital status:      Spouse name: N/A     Number of children: N/A     Years of education: N/A     Occupational History     Not on file.     Social History Main Topics     Smoking status: Former Smoker     Quit date: 1/1/1992     Smokeless tobacco: Not on file     Alcohol use Yes      Comment: wine occas.     Drug use: No     Sexual activity: No     Other Topics Concern     Not on file     Social History Narrative   the patient lives in [place] She has been in and  out of a TCU recently. Her son lives in the basement of her house. She most recently has been in the TCU at [facility].    Review of Systems   C: NEGATIVE for fever, chills, change in weight  I: NEGATIVE for worrisome rashes, moles or lesions  EYES: vague visual disturbance at onset of symtoms  ENT/MOUTH: left facial droop, drooling  R: NEGATIVE for significant cough or SOB  B: NEGATIVE for masses, tenderness or discharge  CV: NEGATIVE for chest pain, palpitations or peripheral edema  GI: NEGATIVE for nausea, abdominal pain, heartburn, or change in bowel habits  : NEGATIVE for frequency, dysuria, or hematuria  M: NEGATIVE for significant arthralgias or myalgia  NEURO: above and transient left hand weakness---normally walks with walker  E: NEGATIVE for temperature intolerance, skin/hair changes  H: NEGATIVE for bleeding problems  P: NEGATIVE for changes in mood or affect    Physical Exam   /87  Resp 21  SpO2 94%     Weight: 0 lbs 0 oz There is no height or weight on file to calculate BMI.     Constitutional: Alert, oriented, cooperative, no apparent distress, appears nontoxic  Eyes: Eyes are clear, pupils are reactive.  HEENT: Oropharynx is clear and moist. No evidence of cranial trauma.left facial droop present  Lymph/Hematologic: No epitrochlear, axillary, anterior or posterior cervical, or supraclavicular lymphadenopathy is appreciated.  Cardiovascular: Regular rate and rhythm, normal S1 and S2, and no murmur noted. JVP is normal. Good peripheral pulses in wrists bilaterally. No lower extremity edema.  Respiratory: Clear to auscultation bilaterally.   GI: Soft, non-tender, normal bowel sounds, no hepatosplenomegaly.  Genitourinary: Deferred  Musculoskeletal: Normal muscle bulk and tone.  Skin: Warm and dry, no rashes.   Neurologic: Neck supple. Cranial nerves are grossly intact-except for a left facial droop. Her facial muscles around her eyes have normal strength.  is symmetric. No drift. Motor  function is normal. Sensory function is normal.reflexes are symmetric    Data   Data reviewed today:     Recent Labs  Lab 07/26/17  1130   WBC 4.8   HGB 11.0*   MCV 89      INR 1.07      POTASSIUM 4.0   CHLORIDE 104   CO2 29   BUN 39*   CR 1.50*   ANIONGAP 4   BLAIR 8.9   *   TROPI <0.015The 99th percentile for upper reference range is 0.045 ug/L.  Troponin values in the range of 0.045 - 0.120 ug/L may be associated with risks of adverse clinical events.       Recent Results (from the past 24 hour(s))   CT Head w/o Contrast    Narrative    CT SCAN OF THE HEAD WITHOUT CONTRAST   7/26/2017 11:50 AM     HISTORY: Stroke symptoms. Left facial droop for an hour. 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    TECHNIQUE:  Axial images of the head and coronal reformations without  IV contrast material. Radiation dose for this scan was reduced using  automated exposure control, adjustment of the mA and/or kV according  to patient size, or iterative reconstruction technique.    COMPARISON: 3/14/2008 MRI and MR angiogram 10/28/2008.    FINDINGS:  There is generalized atrophy of the brain.  There is low  attenuation in the white matter of the cerebral hemispheres consistent  with sequelae of small vessel ischemic disease. Right middle cerebral  artery region aneurysm clip and old right pterional craniotomy with  mesh cause some artifacts that obscure detail locally. There is no  evidence of intracranial hemorrhage, mass, acute infarct or anomaly.     The visualized portions of the sinuses and mastoids appear normal.  There is no evidence of trauma.      Impression    IMPRESSION:   1. No evidence of acute hemorrhage.  2.  Atrophy of the brain.  White matter changes consistent with  sequelae of small vessel ischemic disease.  3. 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    HEIDY  MD [place]   Head MRA w/o contrast - STROKE PROTOCOL    Narrative    MR ANGIOGRAM OF THE HEAD WITHOUT CONTRAST   7/26/2017 1:14 PM     HISTORY: Left facial droop. Stroke symptoms. Intermittent headache and  dizziness. Previous aneurysm clipping.    TECHNIQUE:  3D time-of-flight MR angiogram of the head without  contrast.    COMPARISON: 3/14/2008.    FINDINGS:  Artifacts are seen from the right middle cerebral artery  region aneurysm clip that obscure most of the right middle cerebral  artery and its branches. Internal carotid arteries and basilar and  vertebral arteries are patent with dominant left vertebral artery and  with tiny right vertebral artery ending in the PICA as a normal  variant. Posterior cerebral arteries are patent. Tiny saccular  aneurysm projecting superiorly off the left middle cerebral artery M1  segment measures 2.5 mm diameter, slightly larger than on the previous  exam when it measured 2.2 mm on the raw data images. No other new  aneurysm. Right anterior cerebral artery A1 segment is either absent  or hypoplastic.      Impression    IMPRESSION:  1. Minimal increase in size of the small saccular aneurysm projecting  superiorly off the left middle cerebral artery M1 segment, now 2.5 mm  diameter.  2. 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  3. No evidence of arterial occlusion elsewhere.    HEIDY BERNARDO MD   MRA Neck w/o Contrast Angiogram    Narrative    MRA NECK WITHOUT CONTRAST  7/26/2017 1:49 PM     HISTORY: Left facial droop. Stroke symptoms.    TECHNIQUE: 2D time-of-flight MR angiogram of the neck without  contrast. Estimates of carotid stenoses are made relative to the  distal internal carotid artery diameters except as noted. Renal  insufficiency.    COMPARISON: None.    FINDINGS:    Right Carotid: Patent but tortuous. Artifacts from arterial pulsation  make it impossible to exclude stenoses.    Left Carotid:  Tortuous vessel. No  definite stenosis in the  bifurcation. Poor visualization of the common carotid artery due to  pulsation artifacts.    Vertebral and Basilar:  Dominant left vertebral artery and tiny right  vertebral artery. Vessels are tortuous but patent. Cannot rule out  stenosis.      Impression    IMPRESSION:  Patent main vessels but artifacts from pulsation and  patient motion severely limit detail, making it impossible to exclude  stenoses.    HEIDY BERNARDO MD   MR Brain w/o Contrast    Narrative    MRI BRAIN WITHOUT CONTRAST  7/26/2017 1:49 PM    HISTORY:  Stroke with left facial droop.    TECHNIQUE:  Multiplanar, multisequence MRI of the brain without  gadolinium IV contrast material.  Renal insufficiency.    COMPARISON:  CT scan earlier today.    FINDINGS:  There is generalized atrophy of the brain. White matter  changes are seen in the cerebral hemispheres consistent with sequelae  of small vessel ischemic disease. Old lacunar infarct is seen in the  right corona radiata. There is no evidence of hemorrhage, mass, acute  infarct, or anomaly.  Old microhemorrhage is noted in the right  cerebellar hemisphere centrally. Artifacts from the right middle  cerebral artery region aneurysm clip obscure detail in the right  temporal lobe and inferior right frontal lobe.    There are bilateral intraocular lens implants. The arteries at the  base of the brain and the dural venous sinuses appear patent.       Impression    IMPRESSION:    1. No evidence of acute infarct, acute hemorrhage or mass.  2. Brain atrophy and white matter changes consistent with sequelae of  small vessel ischemic disease.  3. Old lacunar infarct in the right corona radiata.  4. Old microhemorrhage in the right cerebellar hemisphere.  5. 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    HEIDY BERNARDO MD   CT Head Neck Angio w/o & w Contrast    Narrative    CT ANGIOGRAM OF THE HEAD AND NECK WITHOUT AND WITH CONTRAST   7/26/2017  2:44 PM     HISTORY: Left facial droop, possible stroke.    TECHNIQUE:  Precontrast localizing scans were followed by attempted CT  angiography with an injection of 3 mL Isovue 370 IV with scans through  the head and neck. However, the IV blew because of poor IV access and  the skin thus did not trigger. Subsequent iSTAT creatinine measurement  indicated GFR of 29, so the emergency physician, Dr. Robert Borjas, was  contacted and he decided to abort the rest of the exam.    FINDINGS: Noncontrast scans done for a subtraction mask show aneurysm  clip in the region of the right middle cerebral artery and right  pterional craniotomy with brain atrophy. There are bilateral  intraocular lens implants. Noncontrast scans of the neck show arterial  calcification in the region of the right carotid bifurcation and  otherwise are unremarkable.      Impression    IMPRESSION: Noncontrast scans only because the contrast-enhanced  component of the exam was aborted by Dr. Robert Borjas after renal  insufficiency became apparent.    HEIDY BERNARDO MD   Chest XR,  PA & LAT    Narrative    XR CHEST 2 VW 7/26/2017 4:15 PM    HISTORY: Cough.    COMPARISON: None.    FINDINGS: Low lung volume. No airspace consolidation, pleural effusion  or pneumothorax. Benign granuloma in the left upper lung. Normal heart  size.      Impression    IMPRESSION: No acute abnormality.    JUVENTINO GORDON MD       I personally reviewed the EKG tracing showing nsr.    Shamar Orellana MD

## 2017-07-27 ENCOUNTER — APPOINTMENT (OUTPATIENT)
Dept: SPEECH THERAPY | Facility: CLINIC | Age: 82
DRG: 065 | End: 2017-07-27
Payer: MEDICARE

## 2017-07-27 ENCOUNTER — APPOINTMENT (OUTPATIENT)
Dept: ULTRASOUND IMAGING | Facility: CLINIC | Age: 82
DRG: 065 | End: 2017-07-27
Attending: FAMILY MEDICINE
Payer: MEDICARE

## 2017-07-27 ENCOUNTER — APPOINTMENT (OUTPATIENT)
Dept: CARDIOLOGY | Facility: CLINIC | Age: 82
DRG: 065 | End: 2017-07-27
Attending: FAMILY MEDICINE
Payer: MEDICARE

## 2017-07-27 ENCOUNTER — APPOINTMENT (OUTPATIENT)
Dept: NUCLEAR MEDICINE | Facility: CLINIC | Age: 82
DRG: 065 | End: 2017-07-27
Attending: FAMILY MEDICINE
Payer: MEDICARE

## 2017-07-27 ENCOUNTER — APPOINTMENT (OUTPATIENT)
Dept: OCCUPATIONAL THERAPY | Facility: CLINIC | Age: 82
DRG: 065 | End: 2017-07-27
Payer: MEDICARE

## 2017-07-27 ENCOUNTER — APPOINTMENT (OUTPATIENT)
Dept: GENERAL RADIOLOGY | Facility: CLINIC | Age: 82
DRG: 065 | End: 2017-07-27
Attending: FAMILY MEDICINE
Payer: MEDICARE

## 2017-07-27 LAB
ANION GAP SERPL CALCULATED.3IONS-SCNC: 4 MMOL/L (ref 3–14)
BASE EXCESS BLDV CALC-SCNC: 2.7 MMOL/L
BUN SERPL-MCNC: 34 MG/DL (ref 7–30)
CALCIUM SERPL-MCNC: 9.1 MG/DL (ref 8.5–10.1)
CHLORIDE SERPL-SCNC: 107 MMOL/L (ref 94–109)
CO2 SERPL-SCNC: 31 MMOL/L (ref 20–32)
CREAT SERPL-MCNC: 1.48 MG/DL (ref 0.52–1.04)
D DIMER PPP FEU-MCNC: 1.4 UG/ML FEU (ref 0–0.5)
ERYTHROCYTE [DISTWIDTH] IN BLOOD BY AUTOMATED COUNT: 14.2 % (ref 10–15)
GFR SERPL CREATININE-BSD FRML MDRD: 34 ML/MIN/1.7M2
GLUCOSE SERPL-MCNC: 87 MG/DL (ref 70–99)
HCO3 BLDV-SCNC: 29 MMOL/L (ref 21–28)
HCT VFR BLD AUTO: 34.6 % (ref 35–47)
HGB BLD-MCNC: 10.7 G/DL (ref 11.7–15.7)
LMWH PPP CHRO-ACNC: 0.63 IU/ML
MCH RBC QN AUTO: 27.7 PG (ref 26.5–33)
MCHC RBC AUTO-ENTMCNC: 30.9 G/DL (ref 31.5–36.5)
MCV RBC AUTO: 90 FL (ref 78–100)
MRSA DNA SPEC QL NAA+PROBE: NORMAL
PCO2 BLDV: 56 MM HG (ref 40–50)
PH BLDV: 7.33 PH (ref 7.32–7.43)
PLATELET # BLD AUTO: 147 10E9/L (ref 150–450)
PO2 BLDV: 31 MM HG (ref 25–47)
POTASSIUM SERPL-SCNC: 4.5 MMOL/L (ref 3.4–5.3)
RBC # BLD AUTO: 3.86 10E12/L (ref 3.8–5.2)
SODIUM SERPL-SCNC: 142 MMOL/L (ref 133–144)
SPECIMEN SOURCE: NORMAL
WBC # BLD AUTO: 3.8 10E9/L (ref 4–11)

## 2017-07-27 PROCEDURE — 93880 EXTRACRANIAL BILAT STUDY: CPT

## 2017-07-27 PROCEDURE — 40000133 ZZH STATISTIC OT WARD VISIT

## 2017-07-27 PROCEDURE — 40000264 ECHO COMPLETE BUBBLE STUDY WITH OPTISON

## 2017-07-27 PROCEDURE — 85379 FIBRIN DEGRADATION QUANT: CPT | Performed by: FAMILY MEDICINE

## 2017-07-27 PROCEDURE — 85520 HEPARIN ASSAY: CPT | Performed by: FAMILY MEDICINE

## 2017-07-27 PROCEDURE — 27210210 NM LUNG SCAN VENTILATION AND PERFUSION

## 2017-07-27 PROCEDURE — 97161 PT EVAL LOW COMPLEX 20 MIN: CPT | Mod: GP | Performed by: PHYSICAL THERAPIST

## 2017-07-27 PROCEDURE — 25000128 H RX IP 250 OP 636: Performed by: FAMILY MEDICINE

## 2017-07-27 PROCEDURE — 85027 COMPLETE CBC AUTOMATED: CPT | Performed by: FAMILY MEDICINE

## 2017-07-27 PROCEDURE — A9270 NON-COVERED ITEM OR SERVICE: HCPCS | Mod: GY | Performed by: FAMILY MEDICINE

## 2017-07-27 PROCEDURE — 80048 BASIC METABOLIC PNL TOTAL CA: CPT | Performed by: FAMILY MEDICINE

## 2017-07-27 PROCEDURE — A9540 TC99M MAA: HCPCS | Performed by: FAMILY MEDICINE

## 2017-07-27 PROCEDURE — 40000225 ZZH STATISTIC SLP WARD VISIT

## 2017-07-27 PROCEDURE — 97530 THERAPEUTIC ACTIVITIES: CPT | Mod: GP | Performed by: PHYSICAL THERAPIST

## 2017-07-27 PROCEDURE — A9567 TECHNETIUM TC-99M AEROSOL: HCPCS | Performed by: FAMILY MEDICINE

## 2017-07-27 PROCEDURE — 25500064 ZZH RX 255 OP 636: Performed by: FAMILY MEDICINE

## 2017-07-27 PROCEDURE — 87641 MR-STAPH DNA AMP PROBE: CPT | Performed by: FAMILY MEDICINE

## 2017-07-27 PROCEDURE — 99232 SBSQ HOSP IP/OBS MODERATE 35: CPT | Performed by: FAMILY MEDICINE

## 2017-07-27 PROCEDURE — 36415 COLL VENOUS BLD VENIPUNCTURE: CPT | Performed by: FAMILY MEDICINE

## 2017-07-27 PROCEDURE — 87640 STAPH A DNA AMP PROBE: CPT | Performed by: FAMILY MEDICINE

## 2017-07-27 PROCEDURE — 97165 OT EVAL LOW COMPLEX 30 MIN: CPT | Mod: GO

## 2017-07-27 PROCEDURE — 92610 EVALUATE SWALLOWING FUNCTION: CPT | Mod: GN

## 2017-07-27 PROCEDURE — 71020 XR CHEST 2 VW: CPT

## 2017-07-27 PROCEDURE — 93306 TTE W/DOPPLER COMPLETE: CPT | Mod: 26 | Performed by: INTERNAL MEDICINE

## 2017-07-27 PROCEDURE — 34300033 ZZH RX 343: Performed by: FAMILY MEDICINE

## 2017-07-27 PROCEDURE — 82803 BLOOD GASES ANY COMBINATION: CPT | Performed by: FAMILY MEDICINE

## 2017-07-27 PROCEDURE — 12000010 ZZH R&B MS INTERMEDIATE OVERFLOW

## 2017-07-27 PROCEDURE — 25000132 ZZH RX MED GY IP 250 OP 250 PS 637: Mod: GY | Performed by: FAMILY MEDICINE

## 2017-07-27 PROCEDURE — 40000193 ZZH STATISTIC PT WARD VISIT: Performed by: PHYSICAL THERAPIST

## 2017-07-27 RX ADMIN — FUROSEMIDE 20 MG: 20 TABLET ORAL at 08:38

## 2017-07-27 RX ADMIN — CALCIUM CARBONATE-VITAMIN D TAB 500 MG-200 UNIT 1 TABLET: 500-200 TAB at 08:39

## 2017-07-27 RX ADMIN — Medication 200 MG: at 08:39

## 2017-07-27 RX ADMIN — UMECLIDINIUM 18 MCG: 62.5 AEROSOL, POWDER ORAL at 08:40

## 2017-07-27 RX ADMIN — HEPARIN SODIUM 18 UNITS/KG/HR: 10000 INJECTION, SOLUTION INTRAVENOUS at 17:41

## 2017-07-27 RX ADMIN — MEMANTINE 5 MG: 5 TABLET ORAL at 08:39

## 2017-07-27 RX ADMIN — Medication 5500 UNITS: at 17:45

## 2017-07-27 RX ADMIN — ISOSORBIDE MONONITRATE 30 MG: 30 TABLET, EXTENDED RELEASE ORAL at 08:38

## 2017-07-27 RX ADMIN — ACETAMINOPHEN 1000 MG: 500 TABLET ORAL at 20:09

## 2017-07-27 RX ADMIN — OMEPRAZOLE 20 MG: 20 CAPSULE, DELAYED RELEASE ORAL at 08:38

## 2017-07-27 RX ADMIN — Medication 1000 MCG: at 08:38

## 2017-07-27 RX ADMIN — DOCUSATE SODIUM 100 MG: 100 CAPSULE, LIQUID FILLED ORAL at 08:39

## 2017-07-27 RX ADMIN — Medication 4.8 MILLICURIE: at 19:34

## 2017-07-27 RX ADMIN — GABAPENTIN 300 MG: 300 CAPSULE ORAL at 08:39

## 2017-07-27 RX ADMIN — CLOPIDOGREL 75 MG: 75 TABLET, FILM COATED ORAL at 08:39

## 2017-07-27 RX ADMIN — FUROSEMIDE 20 MG: 20 TABLET ORAL at 20:09

## 2017-07-27 RX ADMIN — FLUTICASONE FUROATE AND VILANTEROL TRIFENATATE 1 PUFF: 100; 25 POWDER RESPIRATORY (INHALATION) at 08:41

## 2017-07-27 RX ADMIN — TRIMETHOPRIM 100 MG: 100 TABLET ORAL at 08:40

## 2017-07-27 RX ADMIN — POTASSIUM CHLORIDE 10 MEQ: 1.5 POWDER, FOR SOLUTION ORAL at 08:39

## 2017-07-27 RX ADMIN — ATORVASTATIN CALCIUM 40 MG: 20 TABLET, FILM COATED ORAL at 08:39

## 2017-07-27 RX ADMIN — CITALOPRAM HYDROBROMIDE 20 MG: 20 TABLET ORAL at 08:39

## 2017-07-27 RX ADMIN — GABAPENTIN 300 MG: 300 CAPSULE ORAL at 20:09

## 2017-07-27 RX ADMIN — HUMAN ALBUMIN MICROSPHERES AND PERFLUTREN 2 ML: 10; .22 INJECTION, SOLUTION INTRAVENOUS at 11:57

## 2017-07-27 RX ADMIN — ACETAMINOPHEN 1000 MG: 500 TABLET ORAL at 08:39

## 2017-07-27 RX ADMIN — KIT FOR THE PREPARATION OF TECHNETIUM TC 99M PENTETATE 50.6 MILLICURIE: 20 INJECTION, POWDER, LYOPHILIZED, FOR SOLUTION INTRAVENOUS; RESPIRATORY (INHALATION) at 19:12

## 2017-07-27 NOTE — PROGRESS NOTES
Pt is doing well today, Mild LACEY recovers with rest. O2 saturation did drop to 88% while asleep. VSS. Pt is eating and drinking well. D dimer elevated and pt started on a Heparin gtt. Scheduled to have a VQ scan tonight. Continue with current plan of care.

## 2017-07-27 NOTE — PROGRESS NOTES
"Physical Therapy Evaluation:     07/27/17 1300   Quick Adds   Type of Visit Initial PT Evaluation       Present no   Living Environment   Lives With child(steven), adult   Living Arrangements house   Home Accessibility ramps present at home   Number of Stairs to Enter Home 0   Number of Stairs Within Home 0   Living Environment Comment At baseline, patient lives in a house with her son (son lives in basement). Son works ~8am-5pm Monday - Friday. Pt reports having friends/caregivers come daily except for Thursday where she is completely alone all day. Just started \"Mom's Meals\" but only got 3 meals prior to getting admitted to [facility]. Pt was admitted to hospital from [facility] where she hopes to return.   Self-Care   Usual Activity Tolerance moderate   Current Activity Tolerance fair   Regular Exercise other (see comments)   Activity/Exercise/Self-Care Comment pt was admitted from [facility] where she was getting PT and OT services   Functional Level Prior   Ambulation 1-->assistive equipment   Transferring 1-->assistive equipment   Toileting 1-->assistive equipment   Bathing 2-->assistive person   Dressing 2-->assistive person   Eating 0-->independent   Communication 0-->understands/communicates without difficulty   Swallowing 0-->swallows foods/liquids without difficulty   Cognition 0 - no cognition issues reported   Fall history within last six months yes   Number of times patient has fallen within last six months (numerous falls \"3 really bad ones\")   Which of the above functional risks had a recent onset or change? ambulation;transferring;fall history   Prior Functional Level Comment FWW at baseline; reports painful R-hip; no supplemental O2 at baseline   General Information   Onset of Illness/Injury or Date of Surgery - Date 07/26/17   Referring Physician Shmaar Orellana MD   Patient/Family Goals Statement I want to go back to [facility]   Pertinent History of Current Problem (include personal " factors and/or comorbidities that impact the POC) 84 year old female with a history of hypertension, hypokalemia, benign neoplasm of colon, COPD, arthrosclerosis of renal artery and osteoarthritis who presents by ambulance with a facial droop.    Precautions/Limitations fall precautions   General Info Comments Patient received supine in bed and agreeable to PT evaluation/session. Pleasant but reports being exhausted. On 1LPM via nasal cannula with Sp02 = 96%   Cognitive Status Examination   Orientation orientation to person, place and time   Level of Consciousness alert   Follows Commands and Answers Questions 100% of the time   Personal Safety and Judgment intact   Memory intact   Pain Assessment   Patient Currently in Pain (R-hip pain/discomfort but didn't rate numerically)   Posture    Posture Not impaired   Range of Motion (ROM)   ROM Quick Adds No deficits were identified   ROM Comment BLE hip flex, knee flex/ext, DF/PF WFL   Strength   Strength Comments functional LE strength 3+ to 4-/5   Bed Mobility   Bed Mobility Comments SBA for supine to sit   Transfer Skills   Transfer Comments CGA for STS up to FWW   Gait   Gait Comments 3-4 steps only using FWW to transfer from bed to chair at CGA; pt reports minimal activity was fatiguing    Balance   Balance Comments fairly good static standing balance with BUEs onto FWW at [facility] for safety due to initial reports of dizziness upon standing   Sensory Examination   Sensory Perception no deficits were identified   Sensory Perception Comments BLE light touch grossly intact   Coordination   Coordination no deficits were identified   Muscle Tone   Muscle Tone no deficits were identified   General Therapy Interventions   Planned Therapy Interventions balance training;bed mobility training;gait training;strengthening;transfer training;progressive activity/exercise   Clinical Impression   Criteria for Skilled Therapeutic Intervention yes, treatment indicated   PT Diagnosis  "deconditioning/weakness   Influenced by the following impairments decreased activity tolerance, LE weakness, fall risk   Functional limitations due to impairments bed mobility, transfers, ambulation and stair negotiation   Clinical Presentation Stable/Uncomplicated   Clinical Presentation Rationale clinical judgement   Clinical Decision Making (Complexity) Low complexity   Therapy Frequency` daily   Predicted Duration of Therapy Intervention (days/wks) 3 days   Anticipated Equipment Needs at Discharge (TBD at next level of care)   Anticipated Discharge Disposition Transitional Care Facility   Risk & Benefits of therapy have been explained Yes   Patient, Family & other staff in agreement with plan of care Yes   [facility]-PAC TM \"6 Clicks\"   2016, Trustees of [facility], under license to [employer].  All rights reserved.   6 Clicks Short Forms Basic Mobility Inpatient Short Form   [facility] AM-PAC  \"6 Clicks\" V.2 Basic Mobility Inpatient Short Form   1. Turning from your back to your side while in a flat bed without using bedrails? 4 - None   2. Moving from lying on your back to sitting on the side of a flat bed without using bedrails? 3 - A Little   3. Moving to and from a bed to a chair (including a wheelchair)? 3 - A Little   4. Standing up from a chair using your arms (e.g., wheelchair, or bedside chair)? 3 - A Little   5. To walk in hospital room? 2 - A Lot   6. Climbing 3-5 steps with a railing? 2 - A Lot   Basic Mobility Raw Score (Score out of 24.Lower scores equate to lower levels of function) 17   Total Evaluation Time   Total Evaluation Time (Minutes) 10     "

## 2017-07-27 NOTE — PHARMACY-ANTICOAGULATION SERVICE
Patient to start the heparin DVT/PE protocol with a goal anti 10a level of 0.3-0.7. Using a HT of 63 inches and a WT of 87.1 kg, a dosing WT of 66.3 kg was calculated. Based on this dosing WT, give patient a heparin bolus of 5500 units from the bag and then start a drip at 1200 units/hr. Check the patient's anti 10a level 6 hours after starting the drip at ~1700.   Per DVT/PE protocol.    Diaz Casas [facility]

## 2017-07-27 NOTE — PLAN OF CARE
Problem: Stroke (Ischemic) (Adult)  Goal: Signs and Symptoms of Listed Potential Problems Will be Absent or Manageable (Stroke)  Signs and symptoms of listed potential problems will be absent or manageable by discharge/transition of care (reference Stroke (Ischemic) (Adult) CPG).   Oriented x 4.  Mentation appropriate.  Pleasant and cooperative.  PETERSEN.  Up to commode with assist of 2.  Stated hip hurts with movement.  Denied feeling lightheaded.

## 2017-07-27 NOTE — PROGRESS NOTES
Impressions:   The patient was observed eating and drinking. She demonstrated ability to coordinate breath support, talk, drink and eat. The patient had no s/s of aspiration/penetration. No concerns of aspiration at this time. Recommended diet is regular with thin liquids. Straw okay. No therapy indicated.      07/27/17  Bedside Swallow Evaluation   General Information   Onset Date 07/27/17   Start of Care Date 07/27/17   Referring Physician Shamar Orellana MD   Patient Profile Review/OT: Additional Occupational Profile Info See Profile for full history and prior level of function   Patient/Family Goals Statement None stated   Swallowing Evaluation Bedside swallow evaluation   Behavorial Observations WNL (within normal limits);Alert   Mode of current nutrition Oral diet   Type of oral diet Regular;Thin liquid   Respiratory Status Room air  (room air at trial)   Clinical Swallow Evaluation   Oral Musculature generally intact   Structural Abnormalities none present   Dentition present and adequate;lower dentures  (lower partial)   Mucosal Quality good   Mandibular Strength and Mobility intact   Oral Labial Strength and Mobility impaired retraction;impaired coordination  (left side weakness)   Lingual Strength and Mobility WFL   Velar Elevation intact   Buccal Strength and Mobility intact   Additional Documentation Yes   Clinical Swallow Eval: Thin Liquid Texture Trial   Mode of Presentation, Thin Liquids cup;straw   Volume of Liquid or Food Presented 4 ounces   Oral Phase of Swallow WFL   Pharyngeal Phase of Swallow intact   Diagnostic Statement The patient was observed drinking thin liquids from a straw. She was drinking with consecutive swallow and conversing. No overt s/s of aspiration noted. The patient presented with good AP transfer. Thin liquids are safe for consumption with and without use of straw.    Clinical Swallow Eval: Solid Food Texture Trial   Mode of Presentation, Solid self-fed   Volume of  Solid Food Presented Lunch plate   Oral Phase, Solid WFL   Pharyngeal Phase, Solid intact   Diagnostic Statement The patient was eating lunch at arrival. She was able to multitask while eating. No overt s/s of aspiration were noted. The patient is safe to continue with a regular diet.    VFSS Evaluation   VFSS Additional Documentation No   FEES Evaluation   Additional Documentation No   Swallow Compensations   Swallow Compensations No compensations were used   Results No difficulties noted   Esophageal Phase of Swallow   Patient reports or presents with symptoms of esophageal dysphagia No   Swallow Eval: Clinical Impressions   Skilled Criteria for Therapy Intervention No problems identified which require skilled intervention   Functional Assessment Scale (FAS) 7   Dysphagia Outcome Severity Scale (MARCELINO) Level 7 - MARCELINO   OT: Clinical Decision Making (Complexity) Low complexity   Diet texture recommendations Regular diet;Thin liquids   Recommended Feeding/Eating Techniques (Present food on right side. )   Risks and Benefits of Treatment have been explained. Yes   Patient, family and/or staff in agreement with Plan of Care Yes   Clinical Impression Comments The patient was observed eating and drinking. She demonstrated ability to coordinate breath support, talk, drink and eat. The patient had no S/S of aspiration/penetration. No concerns of aspiration at this time. Recommended diet is regular with thin liquids.    Therapy Certification   Certification date from 07/27/17   Certification date to 07/27/17   Medical Diagnosis Dysphagia   Certification I certify the need for these services furnished under this plan of treatment and while under my care.  (Physician co-signature of this document indicates review and certification of the therapy plan).   Total Evaluation Time   Total Evaluation Time (Minutes) 15

## 2017-07-27 NOTE — PROGRESS NOTES
Pt soundly asleep. O2 saturation dropped to 87% Woke pt, instructed to take deep breaths,placed pt on 1 L nasal cannula. Saturation now 93%

## 2017-07-27 NOTE — PROGRESS NOTES
"[facility] Hospitalist Progress Note           Assessment and Plan:          Acute left facial droop, visual disturbance , reports of transient left hand weakness-- suspect acute CVA  7/26/17 -- -stop asa and start plavix, tele  -stop simvastatin and start atorvastin  -hold lisinopril (permissive hypertension)  7/27/2017 -- improving but still having symptoms.  Speech evaluation pending.  Echo pending.   Check US carotids as MRA could not adequately visualize these.      Acute hypoxia  7/27/2017 -- without respiratory failure.  Currently doing well but with new oxygen requirement.  Checking chest x-ray, VBG and dimer. Clear aspiration risk, but no clear evidence of pneumonia.  Wean oxygen as able.    htn  7/26/17 -- hold lisinopril  7/27/2017 -- continue to hold today as long as blood pressure remains below 220/120.       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     CKD--  7/26/17 -- recent creatinine rise this summer and recent hospitalization at [facility] for \"dehydration\". Creat currently at recent baseline  7/27/2017 -- improving      Restrictive lung disease      DVT Prophylaxis: mechanical     Code Status: Full Code    Disposition  Improving but still with symptoms present - ongoing evaluation as above and work-up for hypoxia - will need at least 1 more night inpatient.  Hope for return to TCU in 1-2 days if does well.              Interval History:   Feels better today but still has left sided mouth \"weakness and burning feeling\" with some drooling from that side of her mouth.  Speech better, but still has a hard time \"getting her words out\" at times.  No fever or chills. Long-standing weakness of left side extremities which is at baseline - no new weakness or arms or legs.  Vision at baseline - has long-standing " "\"black spots\" that have been extensive evaluated and that have been there sine birth.  No diplopia.  No new concerns.  Did drop her sats while resting to 80's, now on oxygen and feels good with this, no history of sleep apnea.            Review of Systems:    ROS: 10 point ROS neg other than the symptoms noted above in the HPI.             Medications:   Current active medications and PTA medications reviewed, see medication list for details.            Physical Exam:   Vitals were reviewed  Patient Vitals for the past 24 hrs:   BP Temp Temp src Pulse Heart Rate Resp SpO2 Height Weight   07/27/17 1205 176/68 - - 57 - 16 - - -   07/27/17 1200 - 98  F (36.7  C) Oral - - - - - -   07/27/17 1000 - - - - - - 94 % - -   07/27/17 0900 - 97.7  F (36.5  C) Oral - - - - - -   07/27/17 0807 142/57 - - - 51 16 96 % - -   07/27/17 0800 142/57 - - - - 16 93 % - -   07/27/17 0754 - - - - 49 - - - -   07/27/17 0500 - - - - - - - - 87.1 kg (192 lb 0.3 oz)   07/27/17 0319 139/55 97.6  F (36.4  C) Oral 57 - 18 93 % - -   07/27/17 0100 - - - - - - 95 % - -   07/27/17 0030 - - - - - - 95 % - -   07/27/17 0000 115/48 - - 50 50 16 93 % - -   07/26/17 2317 149/63 98  F (36.7  C) Oral - 52 16 92 % - -   07/26/17 2300 - - - - - - 94 % - -   07/26/17 2230 - - - - - - 93 % - -   07/26/17 2200 109/54 - - - 46 16 93 % - -   07/26/17 2000 135/73 98  F (36.7  C) Oral 56 56 20 93 % - -   07/26/17 1837 - - - - - - - 1.6 m (5' 3\") 86.5 kg (190 lb 11.2 oz)   07/26/17 1805 - - - - - - (!) 89 % - -   07/26/17 1800 179/74 97.9  F (36.6  C) Oral - 56 20 94 % - -   07/26/17 1730 - - - - 57 20 - - -   07/26/17 1700 - - - - 57 21 94 % - -   07/26/17 1615 - - - - 57 19 96 % - -   07/26/17 1545 185/87 - - - 55 15 97 % - -   07/26/17 1534 - - - - 55 14 96 % - -   07/26/17 1530 (!) 191/95 - - - 57 16 (!) 89 % - -   07/26/17 1500 169/83 - - - 56 10 92 % - -   07/26/17 1445 191/89 - - - 55 15 95 % - -   07/26/17 1430 196/85 - - - - - - - -   07/26/17 1420 (!) " 207/101 - - - 57 - 96 % - -       Temperatures:  Current - Temp: 98  F (36.7  C); Max - Temp  Av.9  F (36.6  C)  Min: 97.6  F (36.4  C)  Max: 98  F (36.7  C)  Respiration range: Resp  Av.7  Min: 10  Max: 21  Pulse range: Pulse  Av  Min: 50  Max: 57  Blood pressure range: Systolic (24hrs), Av , Min:109 , Max:207   ; Diastolic (24hrs), Av, Min:48, Max:101    Pulse oximetry range: SpO2  Av.7 %  Min: 89 %  Max: 97 %  I/O last 3 completed shifts:  In: 600 [P.O.:600]  Out: 725 [Urine:725]    Intake/Output Summary (Last 24 hours) at 17 1236  Last data filed at 17 0800   Gross per 24 hour   Intake              950 ml   Output             1025 ml   Net              -75 ml     EXAM:   GENERAL APPEARANCE ADULT: Alert, no acute distress, oriented x 3  EYES: PERRL, EOM normal, conjunctiva and lids normal, EOM normal  HENT: oral mucous membranes moist, head atraumatic and normocephalic  NECK: No adenopathy,masses or thyromegaly, supple  CV: regular rate and rhythm no murmur  Pulm: clear to auscultation bilaterally  Abdomen: soft, non-tender, no masses, no masses, normal bowel sounds.  MS: extremities normal, no peripheral edema  SKIN: no suspicious lesions or rashes  NEURO: Alert, oriented, speech and mentation normal, some trace drooping of left side of mouth but appears clearly improved from evaluation last night otherwise  Cranial nerves 2-12 are normal., Strength normal and symmetrical in upper and lower extremities other than mild long-standing left sided weakness which she confirms is baseline.  Sensation to light touch intact throughout upper and lower extremities bilaterally.   Reflexes 2+ and symmetrical at biceps, triceps, knees and ankles. Finger to nose and heel to shin testing is normal.  PSYCH: mentation appears normal, affect and mood normal             Data:     Results for orders placed or performed during the hospital encounter of 17 (from the past 24 hour(s))    Head MRA w/o contrast - STROKE PROTOCOL    Narrative    MR ANGIOGRAM OF THE HEAD WITHOUT CONTRAST   7/26/2017 1:14 PM     HISTORY: Left facial droop. Stroke symptoms. Intermittent headache and  dizziness. Previous aneurysm clipping.    TECHNIQUE:  3D time-of-flight MR angiogram of the head without  contrast.    COMPARISON: 3/14/2008.    FINDINGS:  Artifacts are seen from the right middle cerebral artery  region aneurysm clip that obscure most of the right middle cerebral  artery and its branches. Internal carotid arteries and basilar and  vertebral arteries are patent with dominant left vertebral artery and  with tiny right vertebral artery ending in the PICA as a normal  variant. Posterior cerebral arteries are patent. Tiny saccular  aneurysm projecting superiorly off the left middle cerebral artery M1  segment measures 2.5 mm diameter, slightly larger than on the previous  exam when it measured 2.2 mm on the raw data images. No other new  aneurysm. Right anterior cerebral artery A1 segment is either absent  or hypoplastic.      Impression    IMPRESSION:  1. Minimal increase in size of the small saccular aneurysm projecting  superiorly off the left middle cerebral artery M1 segment, now 2.5 mm  diameter.  2. 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  3. No evidence of arterial occlusion elsewhere.    HEIDY BERNARDO MD   MRA Neck w/o Contrast Angiogram    Narrative    MRA NECK WITHOUT CONTRAST  7/26/2017 1:49 PM     HISTORY: Left facial droop. Stroke symptoms.    TECHNIQUE: 2D time-of-flight MR angiogram of the neck without  contrast. Estimates of carotid stenoses are made relative to the  distal internal carotid artery diameters except as noted. Renal  insufficiency.    COMPARISON: None.    FINDINGS:    Right Carotid: Patent but tortuous. Artifacts from arterial pulsation  make it impossible to exclude stenoses.    Left Carotid:  Tortuous vessel. No definite stenosis  in the  bifurcation. Poor visualization of the common carotid artery due to  pulsation artifacts.    Vertebral and Basilar:  Dominant left vertebral artery and tiny right  vertebral artery. Vessels are tortuous but patent. Cannot rule out  stenosis.      Impression    IMPRESSION:  Patent main vessels but artifacts from pulsation and  patient motion severely limit detail, making it impossible to exclude  stenoses.    HEIDY BERNARDO MD   MR Brain w/o Contrast    Narrative    MRI BRAIN WITHOUT CONTRAST  7/26/2017 1:49 PM    HISTORY:  Stroke with left facial droop.    TECHNIQUE:  Multiplanar, multisequence MRI of the brain without  gadolinium IV contrast material.  Renal insufficiency.    COMPARISON:  CT scan earlier today.    FINDINGS:  There is generalized atrophy of the brain. White matter  changes are seen in the cerebral hemispheres consistent with sequelae  of small vessel ischemic disease. Old lacunar infarct is seen in the  right corona radiata. There is no evidence of hemorrhage, mass, acute  infarct, or anomaly.  Old microhemorrhage is noted in the right  cerebellar hemisphere centrally. Artifacts from the right middle  cerebral artery region aneurysm clip obscure detail in the right  temporal lobe and inferior right frontal lobe.    There are bilateral intraocular lens implants. The arteries at the  base of the brain and the dural venous sinuses appear patent.       Impression    IMPRESSION:    1. No evidence of acute infarct, acute hemorrhage or mass.  2. Brain atrophy and white matter changes consistent with sequelae of  small vessel ischemic disease.  3. Old lacunar infarct in the right corona radiata.  4. Old microhemorrhage in the right cerebellar hemisphere.  5. 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    HEIDY BERNARDO MD   CT Head Neck Angio w/o & w Contrast    Narrative    CT ANGIOGRAM OF THE HEAD AND NECK WITHOUT AND WITH CONTRAST  7/26/2017  2:44 PM      HISTORY: Left facial droop, possible stroke.    TECHNIQUE:  Precontrast localizing scans were followed by attempted CT  angiography with an injection of 3 mL Isovue 370 IV with scans through  the head and neck. However, the IV blew because of poor IV access and  the skin thus did not trigger. Subsequent iSTAT creatinine measurement  indicated GFR of 29, so the emergency physician, Dr. Robert Borjas, was  contacted and he decided to abort the rest of the exam.    FINDINGS: Noncontrast scans done for a subtraction mask show aneurysm  clip in the region of the right middle cerebral artery and right  pterional craniotomy with brain atrophy. There are bilateral  intraocular lens implants. Noncontrast scans of the neck show arterial  calcification in the region of the right carotid bifurcation and  otherwise are unremarkable.      Impression    IMPRESSION: Noncontrast scans only because the contrast-enhanced  component of the exam was aborted by Dr. Robert Borjas after renal  insufficiency became apparent.    HEIDY BERNARDO MD   UA reflex to Microscopic   Result Value Ref Range    Color Urine Light Yellow     Appearance Urine Clear     Glucose Urine Negative NEG mg/dL    Bilirubin Urine Negative NEG    Ketones Urine Negative NEG mg/dL    Specific Gravity Urine 1.008 1.003 - 1.035    Blood Urine Trace (A) NEG    pH Urine 7.0 5.0 - 7.0 pH    Protein Albumin Urine Negative NEG mg/dL    Urobilinogen mg/dL Normal 0.0 - 2.0 mg/dL    Nitrite Urine Negative NEG    Leukocyte Esterase Urine Negative NEG    Source Catheterized Urine     RBC Urine 5 (H) 0 - 2 /HPF    WBC Urine 2 0 - 2 /HPF   Lactic acid whole blood   Result Value Ref Range    Lactic Acid 0.6 (L) 0.7 - 2.1 mmol/L   Chest XR,  PA & LAT    Narrative    XR CHEST 2 VW 7/26/2017 4:15 PM    HISTORY: Cough.    COMPARISON: None.    FINDINGS: Low lung volume. No airspace consolidation, pleural effusion  or pneumothorax. Benign granuloma in the left upper lung. Normal  heart  size.      Impression    IMPRESSION: No acute abnormality.    JUVENTINO GORDON MD   Basic metabolic panel   Result Value Ref Range    Sodium 142 133 - 144 mmol/L    Potassium 4.5 3.4 - 5.3 mmol/L    Chloride 107 94 - 109 mmol/L    Carbon Dioxide 31 20 - 32 mmol/L    Anion Gap 4 3 - 14 mmol/L    Glucose 87 70 - 99 mg/dL    Urea Nitrogen 34 (H) 7 - 30 mg/dL    Creatinine 1.48 (H) 0.52 - 1.04 mg/dL    GFR Estimate 34 (L) >60 mL/min/1.7m2    GFR Estimate If Black 41 (L) >60 mL/min/1.7m2    Calcium 9.1 8.5 - 10.1 mg/dL   CBC with platelets   Result Value Ref Range    WBC 3.8 (L) 4.0 - 11.0 10e9/L    RBC Count 3.86 3.8 - 5.2 10e12/L    Hemoglobin 10.7 (L) 11.7 - 15.7 g/dL    Hematocrit 34.6 (L) 35.0 - 47.0 %    MCV 90 78 - 100 fl    MCH 27.7 26.5 - 33.0 pg    MCHC 30.9 (L) 31.5 - 36.5 g/dL    RDW 14.2 10.0 - 15.0 %    Platelet Count 147 (L) 150 - 450 10e9/L           Attestation:  I have reviewed today's vital signs, notes, medications, labs and imaging.  Amount of time performed on this daily note: 30 minutes.     Kenton Blackwood MD, MD

## 2017-07-27 NOTE — PLAN OF CARE
Problem: Goal Outcome Summary  Goal: Goal Outcome Summary  Physical Therapy: Evaluation completed and treatment initiated. Pt able to perform supine to sit at SBA, STS up to FWW at CGA and bed to chair using FWW at CGA, slow but steady but reports that minimal activity was exhausting. On 1LPM throughout with SpO2 = 95-96%. C/o R-hip pain.     Recommendation: Return to [facility] at discharge

## 2017-07-27 NOTE — PROGRESS NOTES
07/27/17 1149   Quick Adds   Type of Visit Initial Occupational Therapy Evaluation   Living Environment   Living Environment Comment Pt originally from home with son. Now from TCU.    Functional Level Prior   Ambulation 1-->assistive equipment   Transferring 1-->assistive equipment   Toileting 1-->assistive equipment   Bathing 2-->assistive person   Dressing 2-->assistive person   Eating 0-->independent   Communication 0-->understands/communicates without difficulty   Swallowing 0-->swallows foods/liquids without difficulty   Cognition 0 - no cognition issues reported   Fall history within last six months yes   Number of times patient has fallen within last six months (describes 3 bad falls. )   Prior Functional Level Comment At baseline: pt is up with walker, has assist from friend for showers.    General Information   Onset of Illness/Injury or Date of Surgery - Date 07/26/17   Referring Physician Shamar Orellana MD   Patient/Family Goals Statement To return to TCU   Additional Occupational Profile Info/Pertinent History of Current Problem Acute left facial droop, visual disturbance , reports of transient left hand weakness--probable CVA   Cognitive Status Examination   Orientation orientation to person, place and time   Level of Consciousness alert   Able to Follow Commands WNL/WFL   Visual Perception   Visual Perception Wears glasses   Visual Perception Comments Pt denies any visual changes at this time. Able to read therapists name tag.    Pain Assessment   Patient Currently in Pain No   Range of Motion (ROM)   ROM Comment B UE ROM: VILMA smith FF- limited to ~90* d/t reported shoulder problems. Elbow and wrist ROM: WNL   Strength   Strength Comments B UE strength: Both grossly weak. Pt reports L feels weaker than R- difficult to tell as unable to fully complete MMT d/t shoulder pain.    Hand Strength   Hand Strength Comments B  strength: WFL and appear equal   Coordination   Upper Extremity  Coordination Left UE impaired   Gross Motor Coordination L UE coordination impaired. L UE dysmetric (observed to touch mouth instead of nose).    Fine Motor Coordination finger to thumb L is equal to R and able to perform with good pace.    Transfer Skill: Bed to Chair/Chair to Bed   Level of [place]: Bed to Chair minimum assist (75% patients effort)   Physical Assist/Nonphysical Assist: Bed to Chair 1 person assist   Weight-Bearing Restrictions full weight-bearing   Assistive Device - Transfer Skill Bed to Chair Chair to Bed Rehab Eval standard walker   Transfer Skill: Sit to Stand   Level of [place]: Sit/Stand minimum assist (75% patients effort)   Physical Assist/Nonphysical Assist: Sit/Stand 1 person assist   Transfer Skill: Sit to Stand full weight-bearing   Assistive Device for Transfer: Sit/Stand standard walker   Transfer Skill: Toilet Transfer   Level of [place]: Toilet minimum assist (75% patients effort)   Physical Assist/Nonphysical Assist: Toilet 1 person assist   Weight-Bearing Restrictions: Toilet full weight-bearing   Assistive Device standard walker   Upper Body Dressing   Level of [place]: Dress Upper Body stand-by assist   Lower Body Dressing   Level of [place]: Dress Lower Body moderate assist (50% patients effort)   Physical Assist/Nonphysical Assist: Dress Lower Body 1 person assist   Eating/Self Feeding   Level of [place]: Eating independent   Activities of Daily Living Analysis   Impairments Contributing to Impaired Activities of Daily Living balance impaired;strength decreased   General Therapy Interventions   Planned Therapy Interventions ADL retraining   Clinical Impression   Criteria for Skilled Therapeutic Interventions Met yes, treatment indicated   OT Diagnosis decreased independence with ADLs and functional mobility   Influenced by the following impairments L UE weakness, generalized weakness   Assessment of Occupational Performance 1-3 Performance  "Deficits   Identified Performance Deficits dressing, toielting, showering   Clinical Decision Making (Complexity) Low complexity   Therapy Frequency daily   Predicted Duration of Therapy Intervention (days/wks) 2-3 days   Anticipated Discharge Disposition Transitional Care Facility   Risks and Benefits of Treatment have been explained. Yes   Patient, Family & other staff in agreement with plan of care Yes   [facility] AM-PAC  \"6 Clicks\" Daily Activity Inpatient Short Form   1. Putting on and taking off regular lower body clothing? 2 - A Lot   2. Bathing (including washing, rinsing, drying)? 3 - A Little   3. Toileting, which includes using toilet, bedpan or urinal? 3 - A Little   4. Putting on and taking off regular upper body clothing? 3 - A Little   5. Taking care of personal grooming such as brushing teeth? 4 - None   6. Eating meals? 4 - None   Daily Activity Raw Score (Score out of 24.Lower scores equate to lower levels of function) 19   Total Evaluation Time   Total Evaluation Time (Minutes) 15     "

## 2017-07-28 VITALS
OXYGEN SATURATION: 95 % | SYSTOLIC BLOOD PRESSURE: 168 MMHG | DIASTOLIC BLOOD PRESSURE: 70 MMHG | TEMPERATURE: 98 F | BODY MASS INDEX: 33.75 KG/M2 | HEART RATE: 56 BPM | RESPIRATION RATE: 16 BRPM | WEIGHT: 190.48 LBS | HEIGHT: 63 IN

## 2017-07-28 LAB
ANION GAP SERPL CALCULATED.3IONS-SCNC: 5 MMOL/L (ref 3–14)
BASOPHILS # BLD AUTO: 0 10E9/L (ref 0–0.2)
BASOPHILS NFR BLD AUTO: 0.9 %
BUN SERPL-MCNC: 33 MG/DL (ref 7–30)
CALCIUM SERPL-MCNC: 9.3 MG/DL (ref 8.5–10.1)
CHLORIDE SERPL-SCNC: 106 MMOL/L (ref 94–109)
CO2 SERPL-SCNC: 29 MMOL/L (ref 20–32)
CREAT SERPL-MCNC: 1.38 MG/DL (ref 0.52–1.04)
DIFFERENTIAL METHOD BLD: ABNORMAL
EOSINOPHIL # BLD AUTO: 0.3 10E9/L (ref 0–0.7)
EOSINOPHIL NFR BLD AUTO: 6.7 %
ERYTHROCYTE [DISTWIDTH] IN BLOOD BY AUTOMATED COUNT: 14 % (ref 10–15)
GFR SERPL CREATININE-BSD FRML MDRD: 36 ML/MIN/1.7M2
GLUCOSE SERPL-MCNC: 96 MG/DL (ref 70–99)
HCT VFR BLD AUTO: 33.2 % (ref 35–47)
HGB BLD-MCNC: 10.3 G/DL (ref 11.7–15.7)
IMM GRANULOCYTES # BLD: 0 10E9/L (ref 0–0.4)
IMM GRANULOCYTES NFR BLD: 0.2 %
LMWH PPP CHRO-ACNC: 1.04 IU/ML
LYMPHOCYTES # BLD AUTO: 1.3 10E9/L (ref 0.8–5.3)
LYMPHOCYTES NFR BLD AUTO: 27.6 %
MCH RBC QN AUTO: 27.6 PG (ref 26.5–33)
MCHC RBC AUTO-ENTMCNC: 31 G/DL (ref 31.5–36.5)
MCV RBC AUTO: 89 FL (ref 78–100)
MONOCYTES # BLD AUTO: 0.4 10E9/L (ref 0–1.3)
MONOCYTES NFR BLD AUTO: 9.1 %
NEUTROPHILS # BLD AUTO: 2.6 10E9/L (ref 1.6–8.3)
NEUTROPHILS NFR BLD AUTO: 55.5 %
PLATELET # BLD AUTO: 144 10E9/L (ref 150–450)
POTASSIUM SERPL-SCNC: 4.2 MMOL/L (ref 3.4–5.3)
RBC # BLD AUTO: 3.73 10E12/L (ref 3.8–5.2)
SODIUM SERPL-SCNC: 140 MMOL/L (ref 133–144)
WBC # BLD AUTO: 4.6 10E9/L (ref 4–11)

## 2017-07-28 PROCEDURE — 85520 HEPARIN ASSAY: CPT | Performed by: FAMILY MEDICINE

## 2017-07-28 PROCEDURE — 25000132 ZZH RX MED GY IP 250 OP 250 PS 637: Mod: GY | Performed by: FAMILY MEDICINE

## 2017-07-28 PROCEDURE — A9270 NON-COVERED ITEM OR SERVICE: HCPCS | Mod: GY | Performed by: FAMILY MEDICINE

## 2017-07-28 PROCEDURE — 85025 COMPLETE CBC W/AUTO DIFF WBC: CPT | Performed by: FAMILY MEDICINE

## 2017-07-28 PROCEDURE — 99239 HOSP IP/OBS DSCHRG MGMT >30: CPT | Performed by: FAMILY MEDICINE

## 2017-07-28 PROCEDURE — 36415 COLL VENOUS BLD VENIPUNCTURE: CPT | Performed by: FAMILY MEDICINE

## 2017-07-28 PROCEDURE — 80048 BASIC METABOLIC PNL TOTAL CA: CPT | Performed by: FAMILY MEDICINE

## 2017-07-28 RX ORDER — CLOPIDOGREL BISULFATE 75 MG/1
75 TABLET ORAL DAILY
Qty: 30 TABLET | Refills: 0 | DISCHARGE
Start: 2017-07-28 | End: 2017-10-03 | Stop reason: ALTCHOICE

## 2017-07-28 RX ORDER — LOVASTATIN 20 MG
20 TABLET ORAL AT BEDTIME
Status: DISCONTINUED | OUTPATIENT
Start: 2017-07-28 | End: 2017-07-28 | Stop reason: CLARIF

## 2017-07-28 RX ORDER — ATORVASTATIN CALCIUM 40 MG/1
40 TABLET, FILM COATED ORAL DAILY
Qty: 30 TABLET | Refills: 3 | DISCHARGE
Start: 2017-07-28 | End: 2017-11-27

## 2017-07-28 RX ORDER — LISINOPRIL 20 MG/1
20 TABLET ORAL DAILY
Status: DISCONTINUED | OUTPATIENT
Start: 2017-07-28 | End: 2017-07-28 | Stop reason: HOSPADM

## 2017-07-28 RX ORDER — SIMVASTATIN 10 MG
10 TABLET ORAL AT BEDTIME
Status: DISCONTINUED | OUTPATIENT
Start: 2017-07-28 | End: 2017-07-28 | Stop reason: HOSPADM

## 2017-07-28 RX ADMIN — OMEPRAZOLE 20 MG: 20 CAPSULE, DELAYED RELEASE ORAL at 09:05

## 2017-07-28 RX ADMIN — ACETAMINOPHEN 1000 MG: 500 TABLET ORAL at 09:05

## 2017-07-28 RX ADMIN — CALCIUM CARBONATE-VITAMIN D TAB 500 MG-200 UNIT 1 TABLET: 500-200 TAB at 09:05

## 2017-07-28 RX ADMIN — Medication 1000 MCG: at 09:07

## 2017-07-28 RX ADMIN — TRIMETHOPRIM 100 MG: 100 TABLET ORAL at 09:07

## 2017-07-28 RX ADMIN — MEMANTINE 5 MG: 5 TABLET ORAL at 09:06

## 2017-07-28 RX ADMIN — CLOPIDOGREL 75 MG: 75 TABLET, FILM COATED ORAL at 09:05

## 2017-07-28 RX ADMIN — POTASSIUM CHLORIDE 10 MEQ: 1.5 POWDER, FOR SOLUTION ORAL at 09:04

## 2017-07-28 RX ADMIN — FLUTICASONE FUROATE AND VILANTEROL TRIFENATATE 1 PUFF: 100; 25 POWDER RESPIRATORY (INHALATION) at 09:08

## 2017-07-28 RX ADMIN — Medication 200 MG: at 09:07

## 2017-07-28 RX ADMIN — DOCUSATE SODIUM 100 MG: 100 CAPSULE, LIQUID FILLED ORAL at 09:05

## 2017-07-28 RX ADMIN — FUROSEMIDE 20 MG: 20 TABLET ORAL at 09:05

## 2017-07-28 RX ADMIN — UMECLIDINIUM 18 MCG: 62.5 AEROSOL, POWDER ORAL at 09:09

## 2017-07-28 RX ADMIN — GABAPENTIN 300 MG: 300 CAPSULE ORAL at 09:05

## 2017-07-28 RX ADMIN — ISOSORBIDE MONONITRATE 30 MG: 30 TABLET, EXTENDED RELEASE ORAL at 09:07

## 2017-07-28 RX ADMIN — Medication 2.5 MG: at 00:23

## 2017-07-28 RX ADMIN — ATORVASTATIN CALCIUM 40 MG: 20 TABLET, FILM COATED ORAL at 09:05

## 2017-07-28 RX ADMIN — CITALOPRAM HYDROBROMIDE 20 MG: 20 TABLET ORAL at 09:05

## 2017-07-28 RX ADMIN — Medication 2.5 MG: at 05:30

## 2017-07-28 ASSESSMENT — ENCOUNTER SYMPTOMS
NAUSEA: 0
CHEST TIGHTNESS: 0
BACK PAIN: 0
HEADACHES: 0
NUMBNESS: 0
CHILLS: 0
LIGHT-HEADEDNESS: 0
DIZZINESS: 0
NECK PAIN: 0
APPETITE CHANGE: 1
FACIAL ASYMMETRY: 1
SHORTNESS OF BREATH: 0
ACTIVITY CHANGE: 1
CONFUSION: 0
SEIZURES: 0
BRUISES/BLEEDS EASILY: 0
FEVER: 0
VOMITING: 0

## 2017-07-28 NOTE — PLAN OF CARE
Problem: Discharge Planning  Goal: Discharge Planning (Adult, OB, Behavioral, Peds)  Outcome: Improving  Hand-off for Care Transitions to Next Level of Care Provider  Name: Jazmin Clifton  MRN #: [id number]  Reason for Hospitalization:  Facial droop [R29.810]  Admit Date/Time: 7/26/2017 11:20 AM  Discharge Date: 7/28/17     Reason for Communication Hand-off Referral: Fragility  Difficulty understanding plan of care     Discharge Plan:  Discharged to: U: [facility]                   Patient agreeable to post-hospital support suggestions:  Yes  Discharge Plan:             Patient is on new medications:   Yes           MTM follow up recommended: Yes           Tel-[employer] program:  Reserve option to present at more appropriate time           Patient will receive  TCU  at:  [facility]     Follow-up specialty is recommended: No       Follow-up plan:  No future appointments. To be seen by  Geriatrics     Any outstanding tests or procedures:               Key Recommendations:  Patient [facility] for MA benefits. She is on the waiting list for 4 ALFs in the region. She knows that she needs more assistance at home. Home Care would be beneficial when she dc's from the TCU.     Anjelica Valiente

## 2017-07-28 NOTE — PHARMACY-ANTICOAGULATION SERVICE
Heparin 10a(Xa) =     Lab Results   Component Value Date    AXA 1.04 (HH) 07/28/2017       Goal level of: 0.3-0.7 IU/mL    Heparin instructions: Hold heparin for 60 minutes, then change heparin infusion rate to 1050 units/hr    Recheck next heparin 10a(Xa): in 6 hours    Heparin dosed per [place] Protocol. Monitor platelets every three days while on anticoagulation therapy.    Isma CrandallD

## 2017-07-28 NOTE — PLAN OF CARE
Problem: Goal Outcome Summary  Goal: Goal Outcome Summary  Occupational Therapy Discharge Summary     Reason for therapy discharge:    Discharged to transitional care facility.     Progress towards therapy goal(s). See goals on Care Plan in [facility] electronic health record for goal details.  Goals partially met.  Barriers to achieving goals:   discharge from facility.     Therapy recommendation(s):    Continued therapy is recommended.  Rationale/Recommendations:  Return to TCU to increase independence with ADLs and functional mobility.

## 2017-07-28 NOTE — PLAN OF CARE
Problem: Goal Outcome Summary  Goal: Goal Outcome Summary  Physical Therapy Discharge Summary     Reason for therapy discharge:    Discharged to transitional care facility.     Progress towards therapy goal(s). See goals on Care Plan in [facility] electronic health record for goal details.  Goals partially met.  Barriers to achieving goals:   discharge from facility.     Therapy recommendation(s):    Continued therapy is recommended.  Rationale/Recommendations:  to increase strength,  improve functional mobility.      Honey Espinoza, PT

## 2017-07-28 NOTE — PLAN OF CARE
Problem: Goal Outcome Summary  Goal: Goal Outcome Summary  Outcome: Improving  Pt had a restful night after she received oxycodone for her hip pain.  She did have 3 large hard BM's between last evening and this morning with relief.  Pt has been on RA maintaining her sat's >90%, lung sounds are diminished with fine crackles in the bases.  Will continue to monitor close for changes.

## 2017-07-28 NOTE — DISCHARGE SUMMARY
[facility] Discharge Summary    Jazmin Clifton MRN# [id number]   Age: 84 year old YOB: 1932     Date of Admission:  7/26/2017  Date of Discharge::  7/28/2017  Admitting Physician:  Shamar Orellana MD  Discharge Physician:  Kenton Blackwood MD, MD             Admission Diagnoses:   Facial droop [R29.810]          Principle Discharge Diagnosis:     Acute left facial droop, visual disturbance , reports of transient left hand weakness-- suspect acute CVA despite MRI normal.      See hospital course for further active diagnoses addressed during this admission.            Procedures:   See EMR for imaging results.          Medications Prior to Admission:     Prescriptions Prior to Admission   Medication Sig Dispense Refill Last Dose     isosorbide mononitrate (IMDUR) 30 MG 24 hr tablet Take 30 mg by mouth daily   7/26/2017 at am     Lactobacillus (ACIDOPHILUS PO) Take 1 capsule by mouth 2 times daily   7/26/2017 at am     Lidocaine-Menthol (ICY HOT LIDOCAINE PLUS MENTHOL EX) Externally apply topically 3 times daily Apply to hips   7/26/2017 at am     TRIMETHOPRIM PO Take 100 mg by mouth daily Give 1 tablet by mouth one time a day for preventative for UTI   7/26/2017 at am     Memantine HCl (NAMENDA PO) Take 5 mg by mouth daily   7/26/2017 at am     nystatin (MYCOSTATIN) 067245 UNIT/GM POWD Apply topically 2 times daily   7/26/2017 at am     OXYCODONE HCL PO Take 2.5 mg by mouth every 4 hours as needed Give 2.5 mg po Q 4 hrs prn pain   7/26/2017 at 0700     potassium chloride (KLOR-CON) 20 MEQ Packet Take 10 mEq by mouth daily Give 10 mEq by mouth one time a day for supplement   7/26/2017 at am     MAGNESIUM OXIDE PO Take 250 mg by mouth daily   7/26/2017 at am     calcium carb 1250 mg, 500 mg [place],/vitamin D 200 units (OSCAL WITH D) 500-200 MG-UNIT per tablet Take 1 tablet by mouth daily   7/26/2017 at am     tiotropium (SPIRIVA) 18 MCG capsule Inhale 18 mcg into the lungs daily   7/26/2017 at  am     budesonide-formoterol (SYMBICORT) 80-4.5 MCG/ACT Inhaler Inhale 2 puffs into the lungs 2 times daily   7/26/2017 at am     Acetaminophen (TYLENOL PO) Take 1,000 mg by mouth 3 times daily    7/26/2017 at am     Furosemide (LASIX PO) Take 20 mg by mouth 2 times daily    7/26/2017 at 0800     Citalopram Hydrobromide (CELEXA PO) Take 20 mg by mouth daily    7/26/2017 at am     LISINOPRIL PO Take 20 mg by mouth daily    7/26/2017 at am     Gabapentin (NEURONTIN PO) Take 300 mg by mouth 2 times daily    7/26/2017 at am     Omeprazole (PRILOSEC PO) Take 20 mg by mouth every morning   7/26/2017 at 0700     cyanocobalamin (VITAMIN  B-12) 1000 MCG tablet Take 1,000 mcg by mouth daily   7/26/2017 at am     Benzocaine-Glycerin-DM (CEPACOL DUAL RELIEF PO) Give 1 dose by mouth every 2 hours as needed for Sore throat Per RSO - package instructions 1 lozenge Q 2 hours.   Unknown at Unknown time     Docusate Sodium (COLACE PO) Take 100 mg by mouth daily   Unknown at Unknown time     [DISCONTINUED] ASPIRIN PO Take 81 mg by mouth daily   7/26/2017 at am     [DISCONTINUED] Simvastatin (ZOCOR PO) Take 10 mg by mouth At Bedtime   7/25/2017 at hs             Discharge Medications:     Current Discharge Medication List      START taking these medications    Details   clopidogrel (PLAVIX) 75 MG tablet Take 1 tablet (75 mg) by mouth daily  Qty: 30 tablet, Refills: 0    Associated Diagnoses: Facial droop      atorvastatin (LIPITOR) 40 MG tablet Take 1 tablet (40 mg) by mouth daily  Qty: 30 tablet, Refills: 3    Associated Diagnoses: Stenosis of right carotid artery         CONTINUE these medications which have NOT CHANGED    Details   isosorbide mononitrate (IMDUR) 30 MG 24 hr tablet Take 30 mg by mouth daily      Lactobacillus (ACIDOPHILUS PO) Take 1 capsule by mouth 2 times daily      Lidocaine-Menthol (ICY HOT LIDOCAINE PLUS MENTHOL EX) Externally apply topically 3 times daily Apply to hips      TRIMETHOPRIM PO Take 100 mg by mouth  daily Give 1 tablet by mouth one time a day for preventative for UTI      Memantine HCl (NAMENDA PO) Take 5 mg by mouth daily      nystatin (MYCOSTATIN) 916774 UNIT/GM POWD Apply topically 2 times daily      OXYCODONE HCL PO Take 2.5 mg by mouth every 4 hours as needed Give 2.5 mg po Q 4 hrs prn pain      potassium chloride (KLOR-CON) 20 MEQ Packet Take 10 mEq by mouth daily Give 10 mEq by mouth one time a day for supplement      MAGNESIUM OXIDE PO Take 250 mg by mouth daily      calcium carb 1250 mg, 500 mg [place],/vitamin D 200 units (OSCAL WITH D) 500-200 MG-UNIT per tablet Take 1 tablet by mouth daily      tiotropium (SPIRIVA) 18 MCG capsule Inhale 18 mcg into the lungs daily      budesonide-formoterol (SYMBICORT) 80-4.5 MCG/ACT Inhaler Inhale 2 puffs into the lungs 2 times daily      Acetaminophen (TYLENOL PO) Take 1,000 mg by mouth 3 times daily       Furosemide (LASIX PO) Take 20 mg by mouth 2 times daily       Citalopram Hydrobromide (CELEXA PO) Take 20 mg by mouth daily       LISINOPRIL PO Take 20 mg by mouth daily       Gabapentin (NEURONTIN PO) Take 300 mg by mouth 2 times daily       Omeprazole (PRILOSEC PO) Take 20 mg by mouth every morning      cyanocobalamin (VITAMIN  B-12) 1000 MCG tablet Take 1,000 mcg by mouth daily      Benzocaine-Glycerin-DM (CEPACOL DUAL RELIEF PO) Give 1 dose by mouth every 2 hours as needed for Sore throat Per RSO - package instructions 1 lozenge Q 2 hours.      Docusate Sodium (COLACE PO) Take 100 mg by mouth daily         STOP taking these medications       ASPIRIN PO Comments:   Reason for Stopping:         Simvastatin (ZOCOR PO) Comments:   Reason for Stopping:                     Consultations:   No consultations were requested during this admission          Brief History of Illness:     From Admission H+P:   Jazmin Clifton is a 84 year old female with the above past medical history now presents with a new left facial droop.The patient apparently had similar symptoms  several weeks ago and was evaluated in the emergency room at [facility]. She was discharged from the emergency room. Subsequent to that she developed an episode that she describes it as dehydration that was associated with low blood pressure. She was again hospitalized in [place]. After this hospitalization she was in the TCU at [facility].  She was there until this morning.     In the past 2 days she reports episodes where she's had trouble getting the right words out of her mouth. Her son thinks that this has been more of a chronic intermittent problem.She was in her room this morning when she developed a left facial droop associated with some drooling. She also had some type of vague visual disturbancethat she characterized as printed words jumping out at her. She went on to develop some transient left hand weakness. She had no numbness. She does report  a burning feeling in her left face.      Currently her only symptom is some drooping of her left mouth associated with some drooling. She was evaluated in the emergency room.A CTA was not done due to abnormal renal function She did have an MRI and MRA  Done which showed no acute findings.. 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 An old right corona radiata lacunar infarct was noted on MRI. The patient is currently on aspirin and low-dose simvastatin.     At this time I am going to admit the patient, put her on telemetry, stop the aspirin, start Plavix, stop simvastatin and start high-dose atorvastatin.  did talk to the [facility] ER stroke team. I will also order an echo. Will hold lisinopril for permissive hypertension.               TODAY:     Subjective:   Improved.  Doing well on room air with no dyspnea.  No fever or chills.  Feels like she still has some slight numbness and weakness of the left edge of her mouth which  "is silghtly better tahn yesterday.  Speech feels better but not quite baseline yet.  Strength overall improving.  Feels ok for discharge to TCU with goal to return home vs assisted living.      ROS:   ROS: 10 point ROS neg other than the symptoms noted above in the HPI.       BP (!) 193/97  Pulse 56  Temp 98  F (36.7  C) (Oral)  Resp 16  Ht 1.6 m (5' 3\")  Wt 86.4 kg (190 lb 7.6 oz)  SpO2 96%  BMI 33.74 kg/m2   EXAM:   GENERAL APPEARANCE ADULT: Alert, no acute distress, oriented x 3  EYES: PERRL, EOM normal, conjunctiva and lids normal, EOM normal  HENT: oral mucous membranes moist, head atraumatic and normocephalic  NECK: No adenopathy,masses or thyromegaly, supple  CV: regular rate and rhythm no murmur  Pulm: clear to auscultation bilaterally  Abdomen: soft, non-tender, no masses, no masses, normal bowel sounds.  MS: extremities normal, no peripheral edema  SKIN: no suspicious lesions or rashes  NEURO: Alert, oriented, speech and mentation normal, some trace drooping of left side of mouth but appears clearly improved from admission and is a bit less notable than yesterday. Cranial nerves 2-12 are normal., Strength normal and symmetrical in upper and lower extremities other than mild long-standing left sided weakness which she confirms is baseline.  Sensation to light touch intact throughout upper and lower extremities bilaterally.   Reflexes 2+ and symmetrical at biceps, triceps, knees and ankles. Finger to nose and heel to shin testing is normal.  PSYCH: mentation appears normal, affect and mood normal                 Hospital Course:        Acute left facial droop, visual disturbance , reports of transient left hand weakness-- suspect acute CVA despite MRI normal.    7/26/17 -- -stop asa and start plavix, tele  -stop simvastatin and start atorvastin  -hold lisinopril (permissive hypertension)  7/27/2017 -- improving but still having symptoms.  Speech evaluation pending.  Echo pending.   Check US carotids " "as MRA could not adequately visualize these.    7/28/2017 -- overall suspect stroke despite negative MRI - echo was unremarkable with negative bubble study.  Carotid stenosis as below.  Switched aspirin to plavix, not dual with borderline platelets.  Simvastatin replaced with lovastatin at higher dose.       Carotid artery stenosis  7/28/2017 -- carotid US showed > 70% stenosis of right carotid bifurcation -- on plavix as above, replaced simvastatin with lovastatin.  Given uncertain picture but concern for suspected stroke will refer to neurology to discuss possible CVA as above.  Recommend discussion with primary care provider/provider at facility or neurology about possible outpatient referral to vascular surgery.     Acute hypoxia  7/27/2017 -- without respiratory failure.  Currently doing well but with new oxygen requirement.  Checking chest x-ray, VBG and dimer. Clear aspiration risk, but no clear evidence of pneumonia.  Wean oxygen as able.  7/28/2017 -- resolved without intervention.  VQ negative for PE.  Unclear etiology but resolved and at baseline, ok for discharge.     htn  7/26/17 -- hold lisinopril  7/27/2017 -- continue to hold today as long as blood pressure remains below 220/120.    7/28/2017 -- restarting lisinopril today - recheck blood pressure at follow-up with primary care provider or at facility within 1 week.      Previous right MCA aneurysm clipping with known left MCA aneurysm   7/26/17 -- under surveillance and minimally changed      MRI suggests previous lacunar stroke      gerd      Diastolic CHF-   7/26/17 -- compensated  7/28/2017 -- no chagne.     CKD--  7/26/17 -- recent creatinine rise this summer and recent hospitalization at [facility] for \"dehydration\". Creat currently at recent baseline  7/28/2017 -- improved during admission      Restrictive lung disease       DVT Prophylaxis: mechanical      Code Status: Full Code            Discharge Instructions and Follow-Up:   Discharge diet: " Cardiac   Discharge activity: Activity as tolerated   Discharge follow-up: Follow up with primary care provider in 7 days, follow-up on blood pressure, recheck BMP and CBC at that time.  Can also discuss vascular surgery referral vs awaiting neurology consult for carotid artery stenosis.               Discharge Disposition:   Discharged to rehabilitation facility      Attestation:  I have reviewed today's vital signs, notes, medications, labs and imaging.  Amount of time performed on this daily note: 50 minutes.    Kenton Blackwood MD, MD

## 2017-07-28 NOTE — PHARMACY - DISCHARGE MEDICATION RECONCILIATION
Discharge medication review for this patient is complete. Pharmacist assisted with medication reconciliation of discharge medications with prior to admission medications.     The following changes were made to the discharge medication list based on pharmacist review:  Added:  Atorvastatin and clopidogrel  Discontinued: aspirin and simvastatin  Changed: NA      Patient's Discharge Medication List  - medications as listed on After Visit Summary (AVS)     Review of your medicines      START taking       Dose / Directions    atorvastatin 40 MG tablet   Commonly known as:  LIPITOR   Used for:  Stenosis of right carotid artery        Dose:  40 mg   Take 1 tablet (40 mg) by mouth daily   Quantity:  30 tablet   Refills:  3       clopidogrel 75 MG tablet   Commonly known as:  PLAVIX   Used for:  Facial droop        Dose:  75 mg   Take 1 tablet (75 mg) by mouth daily   Quantity:  30 tablet   Refills:  0         CONTINUE these medicines which have NOT CHANGED       Dose / Directions    ACIDOPHILUS PO        Dose:  1 capsule   Take 1 capsule by mouth 2 times daily   Refills:  0       budesonide-formoterol 80-4.5 MCG/ACT Inhaler   Commonly known as:  SYMBICORT        Dose:  2 puff   Inhale 2 puffs into the lungs 2 times daily   Refills:  0       calcium carb 1250 mg (500 mg [place])/vitamin D 200 units 500-200 MG-UNIT per tablet   Commonly known as:  OSCAL with D        Dose:  1 tablet   Take 1 tablet by mouth daily   Refills:  0       CELEXA PO        Dose:  20 mg   Take 20 mg by mouth daily   Refills:  0       CEPACOL DUAL RELIEF PO        Give 1 dose by mouth every 2 hours as needed for Sore throat Per RSO - package instructions 1 lozenge Q 2 hours.   Refills:  0       COLACE PO        Dose:  100 mg   Take 100 mg by mouth daily   Refills:  0       cyanocobalamin 1000 MCG tablet   Commonly known as:  vitamin  B-12        Dose:  1000 mcg   Take 1,000 mcg by mouth daily   Refills:  0       ICY HOT LIDOCAINE PLUS MENTHOL EX         Externally apply topically 3 times daily Apply to hips   Refills:  0       isosorbide mononitrate 30 MG 24 hr tablet   Commonly known as:  IMDUR        Dose:  30 mg   Take 30 mg by mouth daily   Refills:  0       LASIX PO        Dose:  20 mg   Take 20 mg by mouth 2 times daily   Refills:  0       LISINOPRIL PO        Dose:  20 mg   Take 20 mg by mouth daily   Refills:  0       MAGNESIUM OXIDE PO        Dose:  250 mg   Take 250 mg by mouth daily   Refills:  0       NAMENDA PO        Dose:  5 mg   Take 5 mg by mouth daily   Refills:  0       NEURONTIN PO        Dose:  300 mg   Take 300 mg by mouth 2 times daily   Refills:  0       nystatin 617314 UNIT/GM Powd   Commonly known as:  MYCOSTATIN        Apply topically 2 times daily   Refills:  0       OXYCODONE HCL PO        Dose:  2.5 mg   Take 2.5 mg by mouth every 4 hours as needed Give 2.5 mg po Q 4 hrs prn pain   Refills:  0       potassium chloride 20 MEQ Packet   Commonly known as:  KLOR-CON        Dose:  10 mEq   Take 10 mEq by mouth daily Give 10 mEq by mouth one time a day for supplement   Refills:  0       PRILOSEC PO        Dose:  20 mg   Take 20 mg by mouth every morning   Refills:  0       tiotropium 18 MCG capsule   Commonly known as:  SPIRIVA        Dose:  18 mcg   Inhale 18 mcg into the lungs daily   Refills:  0       TRIMETHOPRIM PO        Dose:  100 mg   Take 100 mg by mouth daily Give 1 tablet by mouth one time a day for preventative for UTI   Refills:  0       TYLENOL PO        Dose:  1000 mg   Take 1,000 mg by mouth 3 times daily   Refills:  0         STOP taking          ASPIRIN PO           ZOCOR PO                Where to get your medicines      Some of these will need a paper prescription and others can be bought over the counter. Ask your nurse if you have questions.     You don't need a prescription for these medications      atorvastatin 40 MG tablet     clopidogrel 75 MG tablet

## 2017-07-28 NOTE — PROGRESS NOTES
[facility] [facility] DISCHARGE NOTE    Patient discharged to transitional care unit at 5:39 PM via wheel chair. Accompanied by other: friend and staff. Discharge instructions reviewed with patient, opportunity offered to ask questions. Prescriptions - None ordered for discharge- discharged to transitional care. All belongings sent with patient.    Cindy Johanson

## 2017-07-28 NOTE — PHARMACY-ANTICOAGULATION SERVICE
Heparin 10a(Xa) =     Lab Results   Component Value Date    AXA 0.63 07/27/2017       Goal level of: 0.3-0.7 IU/mL    Heparin instructions: Continue rate of 1200 units/hr    Recheck next heparin 10a(Xa): in the AM.    Heparin dosed per [facility] Protocol. Monitor platelets every three days while on anticoagulation therapy.    Robert ESCALONA.

## 2017-07-28 NOTE — PLAN OF CARE
Problem: Goal Outcome Summary  Goal: Goal Outcome Summary  Outcome: Adequate for Discharge Date Met:  07/28/17  Pt declined tx due to discharging back to [facility] and fatigued.

## 2017-08-01 ENCOUNTER — TELEPHONE (OUTPATIENT)
Dept: OTHER | Facility: CLINIC | Age: 82
End: 2017-08-01

## 2017-08-01 ENCOUNTER — NURSING HOME VISIT (OUTPATIENT)
Dept: GERIATRICS | Facility: CLINIC | Age: 82
End: 2017-08-01
Payer: COMMERCIAL

## 2017-08-01 VITALS
RESPIRATION RATE: 18 BRPM | WEIGHT: 188 LBS | OXYGEN SATURATION: 96 % | HEART RATE: 58 BPM | SYSTOLIC BLOOD PRESSURE: 158 MMHG | DIASTOLIC BLOOD PRESSURE: 78 MMHG | BODY MASS INDEX: 33.3 KG/M2 | TEMPERATURE: 97.9 F

## 2017-08-01 DIAGNOSIS — I65.21 CAROTID ARTERY STENOSIS, SYMPTOMATIC, RIGHT: ICD-10-CM

## 2017-08-01 DIAGNOSIS — I10 ESSENTIAL HYPERTENSION: ICD-10-CM

## 2017-08-01 DIAGNOSIS — G45.9 TRANSIENT CEREBRAL ISCHEMIA, UNSPECIFIED TYPE: Primary | ICD-10-CM

## 2017-08-01 DIAGNOSIS — I63.9 CEREBROVASCULAR ACCIDENT (CVA), UNSPECIFIED MECHANISM (H): ICD-10-CM

## 2017-08-01 PROCEDURE — 99310 SBSQ NF CARE HIGH MDM 45: CPT | Performed by: NURSE PRACTITIONER

## 2017-08-01 NOTE — TELEPHONE ENCOUNTER
"Pt referred to [facility] by Kim Hidalgo APRN CNP for carotid artery disease, symptomatic - TIA.     7-26-17 u/s carotid, CTA head and neck, MRA head and neck completed and in EPIC.     Head MRA 7-26-17  Dr. Aiken notes  \"IMPRESSION:  1. Minimal increase in size of the small saccular aneurysm projecting  superiorly off the left middle cerebral artery M1 segment, now 2.5 mm  diameter.  2. 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  3. No evidence of arterial occlusion elsewhere.\"    CTA head/neck, Dr. Aiken notes \"IMPRESSION: Noncontrast scans only because the contrast-enhanced  component of the exam was aborted by Dr. Robert Borjas after renal  insufficiency became apparent.\"       Carotid U/s Dr. Schwarz notes \"IMPRESSION:    1. 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  2. Less than 50% stenosis left carotid bifurcation.  3. 2.3 x 2.4 x 3.2 cm left-sided thyroid nodule.\"    Pt needs to be scheduled for consult with vascular surgery.  Will route to scheduling to coordinate an appointment as soon as possible.    Nubia Wright RN BSN    "

## 2017-08-01 NOTE — PROGRESS NOTES
[facility]  PRIMARY CARE PROVIDER AND CLINIC:  Mirian Aguilera [facility] [id number] [address] / [address] *  Chief Complaint   Patient presents with     Hospital F/U     Nursing Home Acute       HPI:    Jazmin Clifton is a 84 year old  (12/30/1932),[facility].  Hospital stay 7/26/17 through 7/28/17.  Admitted to this facility for  rehab, medical management and nursing care.  Current issues are:         Transient cerebral ischemia, unspecified type  Dizzinesses  Weakness  Cerebrovascular accident (CVA), unspecified mechanism (H)  Carotid artery stenosis, symptomatic, right  Essential hypertension     [facility]. On 7/26/17 she developed sudden onset of L facial droop, L weakness. She was urgently transported to ED and underwent extensive testing. Found to have likely carotid artery stenosis, R>L, no significant infarct found. She reports she continues to have L eye vision difficulty, L mouth is drooping and feels numb, drooling from L corner of mouth. She aslo reports L hand pain in the 4th and 5th fingers which is new since hospital stay.     CODE STATUS/ADVANCE DIRECTIVES DISCUSSION:   CPR/Full code   Patient's living condition: lives alone    ALLERGIES:Accupril; Ace inhibitors; Augmentin; Levofloxacin; Morphine; and Norvasc [amlodipine besylate]  PAST MEDICAL HISTORY:  has a past medical history of ABDOMINAL PAIN GENERALIZED (3/15/2006); Abdominal pain, generalized (3/15/2006); Atherosclerosis of renal artery (H); BENIGN HYPERTENSION (5/1/2006); Benign neoplasm of scalp and skin of neck; Cardiac dysrhythmias NEC; Cerebral aneurysm, nonruptured; Depressive disorder, not elsewhere classified; Esophageal reflux; Female stress incontinence; 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  PAST SURGICAL HISTORY:  has a past surgical history that includes surgical history of -; surgical history of -; surgical history of - (1996); surgical history of -; surgical history of -; cholecystectomy, laporoscopic (1997); and hysterectomy, mirtha (1982).  FAMILY HISTORY: family history includes Asthma in her son; CANCER in her brother and mother; CEREBROVASCULAR DISEASE in her father; DIABETES in her son; Eye Disorder in her son; GASTROINTESTINAL DISEASE in her son; HEART DISEASE in her father. There is no history of C.A.D., Hypertension, Breast Cancer, Cancer - colorectal, or Prostate Cancer.  SOCIAL HISTORY:  reports that she has quit smoking. She does not have any smokeless tobacco history on file. She reports that she drinks alcohol. She reports that she does not use illicit drugs.    Post Discharge Medication Reconciliation Status: discharge medications reconciled and changed, per note/orders (see AVS).  Current Outpatient Prescriptions   Medication Sig Dispense Refill     clopidogrel (PLAVIX) 75 MG tablet Take 1 tablet (75 mg) by mouth daily 30 tablet 0     atorvastatin (LIPITOR) 40 MG tablet Take 1 tablet (40 mg) by mouth daily 30 tablet 3     isosorbide mononitrate (IMDUR) 30 MG 24 hr tablet Take 30 mg by mouth daily       Lactobacillus (ACIDOPHILUS PO) Take 1 capsule by mouth 2 times daily       Lidocaine-Menthol (ICY HOT LIDOCAINE PLUS MENTHOL EX) Externally apply topically 3 times daily Apply to hips       TRIMETHOPRIM PO Take 100 mg by mouth daily Give 1 tablet by mouth one time a day for preventative for UTI       Benzocaine-Glycerin-DM (CEPACOL DUAL RELIEF PO) Give 1 dose by mouth every 2 hours as needed for Sore throat Per RSO - package instructions 1 lozenge Q 2 hours.       Docusate Sodium (COLACE PO) Take 100 mg by mouth daily        Memantine HCl (NAMENDA PO) Take 5 mg by mouth daily       nystatin (MYCOSTATIN) 916525 UNIT/GM POWD Apply topically 2 times daily       OXYCODONE HCL PO Take 2.5 mg by mouth every 4 hours as needed Give 2.5 mg po Q 4 hrs prn pain       potassium chloride (KLOR-CON) 20 MEQ Packet Take 10 mEq by mouth daily Give 10 mEq by mouth one time a day for supplement       MAGNESIUM OXIDE PO Take 250 mg by mouth daily       calcium carb 1250 mg, 500 mg [place],/vitamin D 200 units (OSCAL WITH D) 500-200 MG-UNIT per tablet Take 1 tablet by mouth daily       tiotropium (SPIRIVA) 18 MCG capsule Inhale 18 mcg into the lungs daily       budesonide-formoterol (SYMBICORT) 80-4.5 MCG/ACT Inhaler Inhale 2 puffs into the lungs 2 times daily       Acetaminophen (TYLENOL PO) Take 1,000 mg by mouth 3 times daily        Furosemide (LASIX PO) Take 20 mg by mouth 2 times daily        Citalopram Hydrobromide (CELEXA PO) Take 20 mg by mouth daily        LISINOPRIL PO Take 20 mg by mouth daily        Gabapentin (NEURONTIN PO) Take 300 mg by mouth 2 times daily        Omeprazole (PRILOSEC PO) Take 20 mg by mouth every morning       cyanocobalamin (VITAMIN  B-12) 1000 MCG tablet Take 1,000 mcg by mouth daily         ROS:  4 point ROS including Respiratory, CV, GI and , other than that noted in the HPI,  is negative    Exam:  /78  Pulse 58  Temp 97.9  F (36.6  C)  Resp 18  Wt 188 lb (85.3 kg)  SpO2 96%  BMI 33.3 kg/m2  GENERAL APPEARANCE:  Alert, in no acute distress  HEAD:  Normal, normocephalic, atraumatic; mild L mouth droop, L facial droop   EYE EXAM: normal external eye, conjunctiva, lids, PAPI  NECK EXAM: supple, no JVD  CHEST/RESP:  respiratory effort normal, lung sounds CTA , no respiratory distress  CV:  Rate reg, rhythm reg, no murmur, 1+ peripheral edema bilateral LE  M/S:   extremities normal, gait normal-with rolling walker, normal muscle tone, and range of motion normal bilateral U & L E  SKIN EXAM: CDI  NEUROLOGIC  EXAM: Normal gross motor movement, tone and coordination. No tremor.  Cranial nerves 2-12 are normal tested and grossly at patient's baseline  PSYCH:  Alert and oriented to person-place-time , affect pleasant , judgement appropriate       Lab/Diagnostic data:     CBC RESULTS:   Recent Labs   Lab Test  07/28/17   0600  07/27/17   0610   WBC  4.6  3.8*   RBC  3.73*  3.86   HGB  10.3*  10.7*   HCT  33.2*  34.6*   MCV  89  90   MCH  27.6  27.7   MCHC  31.0*  30.9*   RDW  14.0  14.2   PLT  144*  147*       Last Basic Metabolic Panel:  Recent Labs   Lab Test  07/28/17   0600  07/27/17   0610   NA  140  142   POTASSIUM  4.2  4.5   CHLORIDE  106  107   BLAIR  9.3  9.1   CO2  29  31   BUN  33*  34*   CR  1.38*  1.48*   GLC  96  87       Liver Function Studies - No results for input(s): PROTTOTAL, ALBUMIN, BILITOTAL, ALKPHOS, AST, ALT, BILIDIRECT in the last 37925 hours.    TSH   Date Value Ref Range Status   04/18/2007 0.60 0.4 - 5.0 mU/L Final   09/26/2006 0.84 0.4 - 5.0 mU/L Final   ]    No results found for: A1C    ASSESSMENT/PLAN:  Transient cerebral ischemia, unspecified type  CVA  Carotid Artery stenosis  Found 70% occlusion on R carotid artery on US. She is interested in vascular consult at [facility]. L facial droop/drooling continues but is improving slowly. Is ambulating, participating in therapy.   - VASCULAR SURGERY REFERRAL    Essential hypertension  BP trending 120-180 systolic. Monitor. May need to increase medications.        Orders:  1. Speech therapy to eval and treat Dx TIA, poor voice control  2.  Please schedule vascular appt at [facility] Dx TIA  3. Lab draw 8/8/17 CBC and BMP Dx anemia and HTN    Electronically signed by:  Kim Hidalgo CNP   [facility]  [phone] cell

## 2017-08-02 NOTE — TELEPHONE ENCOUNTER
Yesterday patient was scheduled for consult on 8/8/17 with Dr. Parry in [place]. Trying to coordinate CTA head/neck for patient in [place].      is Melissa Zamudio at [facility] [place] at [phone]

## 2017-08-02 NOTE — TELEPHONE ENCOUNTER
Per Dr. Parry's nurse, additional imaging is not necessary right now. We will have patient see Dr. Parry on 8/8/17 and decide from there.

## 2017-08-03 ENCOUNTER — DOCUMENTATION ONLY (OUTPATIENT)
Dept: OTHER | Facility: CLINIC | Age: 82
End: 2017-08-03

## 2017-08-03 VITALS
OXYGEN SATURATION: 91 % | HEIGHT: 63 IN | WEIGHT: 185.9 LBS | DIASTOLIC BLOOD PRESSURE: 73 MMHG | HEART RATE: 54 BPM | BODY MASS INDEX: 32.94 KG/M2 | RESPIRATION RATE: 16 BRPM | SYSTOLIC BLOOD PRESSURE: 138 MMHG | TEMPERATURE: 97.7 F

## 2017-08-03 DIAGNOSIS — Z71.89 ACP (ADVANCE CARE PLANNING): Chronic | ICD-10-CM

## 2017-08-03 RX ORDER — BISACODYL 10 MG
10 SUPPOSITORY, RECTAL RECTAL DAILY PRN
COMMUNITY
End: 2018-06-18

## 2017-08-03 NOTE — PROGRESS NOTES
[facility]  PRIMARY CARE PROVIDER AND CLINIC:  Mirian Aguilera [facility] [id number] [address] / [address] *  Chief Complaint   Patient presents with     Establish Care       HPI:   PATIENT OUTSIDE THE FACILITY WITH FAMILY AT THE TIME THE WRITER WENT TO SEE HER. Case reviewed, discussed with the nursing staff. Will attempt to see Ms. Clifton again next week. GNP will continue to follow.     Jazmin Clifton is a 84 year old  (12/30/1932),admitted to the [facility] from [facility].  Hospital stay 7/26/17 through 7/28/17.  Admitted to this facility for  rehab, medical management and nursing care.  HPI information obtained from: facility chart records, facility staff and [facility] chart review.      Briefly, pt developed acute left facial droop and visual disturbance, with left hand weakness, send to [facility]. 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 , CTA was not done due to renal dysfunction, carotid doppler showed > 70% stenosis RCA. ASA stopped and Plavix started, simvastatin stopped and atorvastatin started, lisinopril stopped for permissive HTN. CTA also showed Less than 50% stenosis left carotid bifurcation, - 2.3 x 2.4 x 3.2 cm left-sided thyroid nodule.    Was seen by GNP Hidalgo, Pt want to pursue further by seeing a vascular surgeon for possible surgical intervention.     Also noted improvement in the GFR status.       Electronically signed by:  Toño Shafer MD

## 2017-08-04 ENCOUNTER — NURSING HOME VISIT (OUTPATIENT)
Dept: GERIATRICS | Facility: CLINIC | Age: 82
End: 2017-08-04

## 2017-08-04 DIAGNOSIS — I63.9 CEREBROVASCULAR ACCIDENT (CVA), UNSPECIFIED MECHANISM (H): Primary | ICD-10-CM

## 2017-08-04 DIAGNOSIS — I10 ESSENTIAL HYPERTENSION: ICD-10-CM

## 2017-08-04 DIAGNOSIS — I65.21 CAROTID ARTERY STENOSIS, SYMPTOMATIC, RIGHT: ICD-10-CM

## 2017-08-08 ENCOUNTER — OFFICE VISIT (OUTPATIENT)
Dept: VASCULAR SURGERY | Facility: CLINIC | Age: 82
End: 2017-08-08
Payer: COMMERCIAL

## 2017-08-08 ENCOUNTER — HOSPITAL LABORATORY (OUTPATIENT)
Facility: OTHER | Age: 82
End: 2017-08-08

## 2017-08-08 VITALS — HEART RATE: 67 BPM | DIASTOLIC BLOOD PRESSURE: 67 MMHG | SYSTOLIC BLOOD PRESSURE: 160 MMHG | RESPIRATION RATE: 16 BRPM

## 2017-08-08 DIAGNOSIS — I65.21 CAROTID STENOSIS, SYMPTOMATIC W/O INFARCT, RIGHT: Primary | ICD-10-CM

## 2017-08-08 LAB
ANION GAP SERPL CALCULATED.3IONS-SCNC: 4 MMOL/L (ref 3–14)
BUN SERPL-MCNC: 30 MG/DL (ref 7–30)
CALCIUM SERPL-MCNC: 9.3 MG/DL (ref 8.5–10.1)
CHLORIDE SERPL-SCNC: 109 MMOL/L (ref 94–109)
CO2 SERPL-SCNC: 28 MMOL/L (ref 20–32)
CREAT SERPL-MCNC: 1.36 MG/DL (ref 0.52–1.04)
ERYTHROCYTE [DISTWIDTH] IN BLOOD BY AUTOMATED COUNT: 14.4 % (ref 10–15)
GFR SERPL CREATININE-BSD FRML MDRD: 37 ML/MIN/1.7M2
GLUCOSE SERPL-MCNC: 86 MG/DL (ref 70–99)
HCT VFR BLD AUTO: 33.1 % (ref 35–47)
HGB BLD-MCNC: 10.2 G/DL (ref 11.7–15.7)
MCH RBC QN AUTO: 27.6 PG (ref 26.5–33)
MCHC RBC AUTO-ENTMCNC: 30.8 G/DL (ref 31.5–36.5)
MCV RBC AUTO: 90 FL (ref 78–100)
PLATELET # BLD AUTO: 142 10E9/L (ref 150–450)
POTASSIUM SERPL-SCNC: 4.6 MMOL/L (ref 3.4–5.3)
RBC # BLD AUTO: 3.7 10E12/L (ref 3.8–5.2)
SODIUM SERPL-SCNC: 141 MMOL/L (ref 133–144)
WBC # BLD AUTO: 4.4 10E9/L (ref 4–11)

## 2017-08-08 PROCEDURE — 99204 OFFICE O/P NEW MOD 45 MIN: CPT | Performed by: SURGERY

## 2017-08-08 NOTE — NURSING NOTE
"Chief Complaint   Patient presents with     Consult     carotid stenosis       Initial /67 (BP Location: Right arm, Patient Position: Chair, Cuff Size: Adult Regular)  Pulse 67  Resp 16 Estimated body mass index is 32.93 kg/(m^2) as calculated from the following:    Height as of 8/3/17: 1.6 m (5' 3\").    Weight as of 8/3/17: 84.3 kg (185 lb 14.4 oz).  Medication Reconciliation: complete.   montserrat gramajo LPN        "

## 2017-08-08 NOTE — MR AVS SNAPSHOT
"              After Visit Summary   8/8/2017    Jazmin Clifton    MRN: [id number]           Patient Information     Date Of Birth          12/30/1932        Visit Information        Provider Department      8/8/2017 10:15 AM Hilario Parry MD [facility]        Today's Diagnoses     Carotid stenosis, symptomatic w/o infarct, right    -  1       Follow-ups after your visit        Your next 10 appointments already scheduled     Aug 22, 2017 10:00 AM CDT   Nursing Home with [facility]   [facility]iatric[facility] Transitional [facility] ([facility])    [address] [address]   [phone]            Sep 25, 2017  9:30 AM CDT   New Visit with Heather Whitaker MD   [facility] ([facility]    [address] [facility] [address] [phone]              Who to contact     If you have questions or need follow up information about today's clinic visit or your schedule please contact [facility] directly at [phone].  Normal or non-critical lab and imaging results will be communicated to you by [employer], letter or phone within 4 business days after the clinic has received the results. If you do not hear from us within 7 days, please contact the clinic through [employer] or phone. If you have a critical or abnormal lab result, we will notify you by phone as soon as possible.  Submit refill requests through [employer] or call your pharmacy and they will forward the refill request to us. Please allow 3 business days for your refill to be completed.          Additional Information About Your Visit        [employer] Information     [employer] lets you send messages to your doctor, view your test results, renew your prescriptions, schedule appointments and more. To sign up, go to [email or web address] . Click on \"Log in\" on the left side of the screen, which will take you to the Welcome page. Then click on \"Sign up Now\" on the right side of " the page.     You will be asked to enter the access code listed below, as well as some personal information. Please follow the directions to create your username and password.     Your access code is: [email or web address]  Expires: 10/26/2017  1:54 PM     Your access code will  in 90 days. If you need help or a new code, please call your [facility] clinic or [phone].        Care EveryWhere ID     This is your Care EveryWhere ID. This could be used by other organizations to access your [facility] medical records  [id number]        Your Vitals Were     Pulse Respirations                67 16           Blood Pressure from Last 3 Encounters:   17 126/57   08/15/17 130/69   17 (!) 170/97    Weight from Last 3 Encounters:   17 85.7 kg (189 lb)   08/15/17 86.2 kg (190 lb)   17 86.2 kg (190 lb)              Today, you had the following     No orders found for display       Primary Care Provider Office Phone # Fax #    Mirian Aguilera [phone]       [facility] [id number] [place] [address]        Equal Access to Services     AISSATOU MCADAMS : Hadii aad ku hadasho Sothaisali, waaxda luqadaha, qaybta kaalmada adeegyada, andrew jamesno. So [facility] [phone].    ATENCIÓN: Si habla español, tiene a ortega disposición servicios gratuitos de asistencia lingüística. Cainame al [phone].    We comply with applicable federal civil rights laws and [place] laws. We do not discriminate on the basis of race, color, national origin, age, disability sex, sexual orientation or gender identity.            Thank you!     Thank you for choosing [facility]  for your care. Our goal is always to provide you with excellent care. Hearing back from our patients is one way we can continue to improve our services. Please take a few minutes to complete the written survey that you may receive in the mail after your visit with us. Thank you!              Your Updated Medication List - Protect others around you: Learn how to safely use, store and throw away your medicines at www.disposemymeds.org.          This list is accurate as of: 8/8/17 11:59 PM.  Always use your most recent med list.                   Brand Name Dispense Instructions for use Diagnosis    ACIDOPHILUS PO      Take 1 capsule by mouth 2 times daily        atorvastatin 40 MG tablet    LIPITOR    30 tablet    Take 1 tablet (40 mg) by mouth daily    Stenosis of right carotid artery       bisacodyl 10 MG Suppository    DULCOLAX     Place 10 mg rectally daily as needed for constipation        budesonide-formoterol 80-4.5 MCG/ACT Inhaler    SYMBICORT     Inhale 2 puffs into the lungs 2 times daily        calcium carb 1250 mg (500 mg [place])/vitamin D 200 units 500-200 MG-UNIT per tablet    OSCAL with D     Take 1 tablet by mouth daily        CELEXA PO      Take 20 mg by mouth daily        CEPACOL DUAL RELIEF PO      Give 1 dose by mouth every 2 hours as needed for Sore throat Per RSO - package instructions 1 lozenge Q 2 hours.        clopidogrel 75 MG tablet    PLAVIX    30 tablet    Take 1 tablet (75 mg) by mouth daily    Facial droop       COLACE PO      Take 100 mg by mouth daily        cyanocobalamin 1000 MCG tablet    vitamin  B-12     Take 1,000 mcg by mouth daily        ICY HOT LIDOCAINE PLUS MENTHOL EX      Externally apply topically 3 times daily Apply to hips        isosorbide mononitrate 30 MG 24 hr tablet    IMDUR     Take 30 mg by mouth daily        LASIX PO      Take 20 mg by mouth 2 times daily        LISINOPRIL PO      Take 30 mg by mouth daily        magnesium citrate solution      Take by mouth daily as needed for other Give 240 cc by mouth as needed for BOWEL HEALTH Day 3: If no BM results from Fleets enema by 1800 - give 240 cc of Magnesium Citrate. If no results by day 4 - notify MD.        magnesium hydroxide 400 MG/5ML suspension    MILK OF MAGNESIA     Take 30 mLs by mouth  daily as needed for constipation or heartburn        MAGNESIUM OXIDE PO      Take 250 mg by mouth daily        NAMENDA PO      Take 5 mg by mouth daily        NEURONTIN PO      Take 300 mg by mouth 2 times daily        nystatin 005266 UNIT/GM Powd    MYCOSTATIN     Apply topically 2 times daily        OXYCODONE HCL PO      Take 2.5 mg by mouth every 4 hours as needed Give 2.5 mg po Q 4 hrs prn pain        potassium chloride 20 MEQ Packet    KLOR-CON     Take 10 mEq by mouth daily Give 10 mEq by mouth one time a day for supplement        PRILOSEC PO      Take 20 mg by mouth every morning        SODIUM PHOSPHATES RE      Place 1 Dose rectally daily as needed Day 3: If no BM results from suppository by 1200 - administer Fleets enema RC per RSO.        tiotropium 18 MCG capsule    SPIRIVA     Inhale 18 mcg into the lungs daily        TRIMETHOPRIM PO      Take 100 mg by mouth daily Give 1 tablet by mouth one time a day for preventative for UTI        TYLENOL PO      Take 1,000 mg by mouth 3 times daily

## 2017-08-09 VITALS
OXYGEN SATURATION: 94 % | HEART RATE: 70 BPM | TEMPERATURE: 97.3 F | DIASTOLIC BLOOD PRESSURE: 97 MMHG | RESPIRATION RATE: 18 BRPM | SYSTOLIC BLOOD PRESSURE: 170 MMHG | WEIGHT: 190 LBS | BODY MASS INDEX: 33.66 KG/M2

## 2017-08-10 NOTE — PROGRESS NOTES
[facility]    Chief Complaint   Patient presents with     Nursing Home Acute       HPI:    Jazmin Clifton is a 84 year old  (12/30/1932),admitted to the [facility] from [facility].  Hospital stay 7/26/17 through 7/28/17.  Admitted to this facility for  rehab, medical management and nursing care.  HPI information obtained from: facility chart records, facility staff and [facility] chart review.       Briefly, pt developed acute left facial droop and visual disturbance, with left hand weakness, send to [facility]. 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 , CTA was not done due to renal dysfunction, carotid doppler showed > 70% stenosis RCA. ASA stopped and Plavix started, simvastatin stopped and atorvastatin started, lisinopril stopped for permissive HTN. USG carotid showed Greater than 70% stenosis right carotid bifurcation, Less than 50% stenosis left carotid bifurcation, - 2.3 x 2.4 x 3.2 cm left-sided thyroid nodule.     Was seen by RIMA Hidalgo, Pt want to pursue further by seeing a vascular surgeon for possible surgical intervention.     Ms. Clifton seen and examined today. Reports she visited Vascular surgeon and the plan is to have a surgery on the right neck next month, and the date yet to be decided. Denies any new weakness, and episode of being fuzzy. KIMBERLY Petty was in the room and reports Ms. Clifton is making a progress. Reports has an appointment with Dr. Whitaker, neurologist.       #ALLERGIES: Accupril; Ace inhibitors; Augmentin; Levofloxacin; Morphine; and Norvasc [amlodipine besylate]  #Past Medical, Surgical, Family and Social History reviewed and updated in EPIC.  #Medications reviewed: Reviewed in the chart and EHR.      # REVIEW OF SYSTEMS:  10 point ROS of systems including Constitutional, Eyes, Respiratory, Cardiovascular,  Gastroenterology, Genitourinary, Integumentary, Muscularskeletal, Psychiatric were all negative except for pertinent positives noted in my HPI.    Physical Exam:  BP (!) 170/97  Pulse 70  Temp 97.3  F (36.3  C)  Resp 18  Wt 190 lb (86.2 kg)  SpO2 94%  BMI 33.66 kg/m2  GENERAL APPEARANCE:  Alert, in no distress  ENT:  Mouth and posterior oropharynx normal, moist mucous membranes  NECK: No carotid bruit. No  thyromegaly  RESP:  respiratory effort and palpation of chest normal, lungs clear to auscultation , no respiratory distress  CV:  Palpation and auscultation of heart done , regular rate and rhythm, no murmur, rub, or gallop, peripheral edema 2+ in pedal- pitting b/l.   ABDOMEN:  normal bowel sounds, soft, nontender, no hepatosplenomegaly or other masses  M/S:   Gait and station abnormal Uses a walker.Ms strength: 5/5 right side 4/5 left side.   NEURO:   Cranial nerves 2-12 are normal tested and grossly at patient's baseline, no purposeful movement in upper and lower extremities. No facial droop.   PSYCH:  AAOx3. Mood and affect appropriate.     Recent Labs:    Results for orders placed or performed in visit on 08/08/17   Basic metabolic panel   Result Value Ref Range    Sodium 141 133 - 144 mmol/L    Potassium 4.6 3.4 - 5.3 mmol/L    Chloride 109 94 - 109 mmol/L    Carbon Dioxide 28 20 - 32 mmol/L    Anion Gap 4 3 - 14 mmol/L    Glucose 86 70 - 99 mg/dL    Urea Nitrogen 30 7 - 30 mg/dL    Creatinine 1.36 (H) 0.52 - 1.04 mg/dL    GFR Estimate 37 (L) >60 mL/min/1.7m2    GFR Estimate If Black 45 (L) >60 mL/min/1.7m2    Calcium 9.3 8.5 - 10.1 mg/dL   CBC with platelets   Result Value Ref Range    WBC 4.4 4.0 - 11.0 10e9/L    RBC Count 3.70 (L) 3.8 - 5.2 10e12/L    Hemoglobin 10.2 (L) 11.7 - 15.7 g/dL    Hematocrit 33.1 (L) 35.0 - 47.0 %    MCV 90 78 - 100 fl    MCH 27.6 26.5 - 33.0 pg    MCHC 30.8 (L) 31.5 - 36.5 g/dL    RDW 14.4 10.0 - 15.0 %    Platelet Count 142 (L) 150 - 450 10e9/L          Assessment/Plan:  Cerebrovascular accident (CVA), unspecified mechanism (H)  Carotid artery stenosis, symptomatic, right  - old lacunar stroke, with recurrent TIA.  - symptomatic right carotid artery stenosis, will need a surgery in September.   - continue plavix and lipitor  - has a follow up with  Neurologist Dr. Whitaker's, follow    Thyroid nodule  - multiple, asymptomatic  - will check TSH, follow on the result.     Stage 3 chronic kidney disease (H)   - Avoid nephrotoxic drugs  - Renal dose the medications.       Essential hypertension   BP Readings from Last 3 Encounters:   08/09/17 (!) 170/97   08/08/17 160/67   08/03/17 138/73   - on Imdur, lisinopril 20 mg.   - Isradipine was stopped when Pt had dizziness, however, since now it is related to carotid stenosis, will resume isradipine  5 mg bid, for better BP control.  - Keep SBP b/w 120-130 mmHg (has aneurysm repair).  - May consider increasing lasix if BP continue to be suboptimal.       Orders:  - See above, otherwise, continue the rest of the current POC.     Electronically signed by  Toño Shafer MD

## 2017-08-11 ENCOUNTER — NURSING HOME VISIT (OUTPATIENT)
Dept: GERIATRICS | Facility: CLINIC | Age: 82
End: 2017-08-11
Payer: COMMERCIAL

## 2017-08-11 DIAGNOSIS — E04.1 THYROID NODULE: ICD-10-CM

## 2017-08-11 DIAGNOSIS — N18.30 STAGE 3 CHRONIC KIDNEY DISEASE (H): ICD-10-CM

## 2017-08-11 DIAGNOSIS — I10 ESSENTIAL HYPERTENSION: ICD-10-CM

## 2017-08-11 DIAGNOSIS — I63.9 CEREBROVASCULAR ACCIDENT (CVA), UNSPECIFIED MECHANISM (H): Primary | ICD-10-CM

## 2017-08-11 DIAGNOSIS — I65.21 CAROTID ARTERY STENOSIS, SYMPTOMATIC, RIGHT: ICD-10-CM

## 2017-08-11 PROCEDURE — 99207 ZZC CDG-CORRECTLY CODED, REVIEWED AND AGREE: CPT | Performed by: FAMILY MEDICINE

## 2017-08-11 PROCEDURE — 99310 SBSQ NF CARE HIGH MDM 45: CPT | Performed by: FAMILY MEDICINE

## 2017-08-14 ENCOUNTER — HOSPITAL LABORATORY (OUTPATIENT)
Facility: OTHER | Age: 82
End: 2017-08-14

## 2017-08-14 LAB — TSH SERPL DL<=0.005 MIU/L-ACNC: 0.62 MU/L (ref 0.4–4)

## 2017-08-15 ENCOUNTER — NURSING HOME VISIT (OUTPATIENT)
Dept: GERIATRICS | Facility: CLINIC | Age: 82
End: 2017-08-15
Payer: COMMERCIAL

## 2017-08-15 VITALS
HEART RATE: 70 BPM | DIASTOLIC BLOOD PRESSURE: 69 MMHG | WEIGHT: 190 LBS | RESPIRATION RATE: 18 BRPM | TEMPERATURE: 98 F | BODY MASS INDEX: 33.66 KG/M2 | OXYGEN SATURATION: 97 % | SYSTOLIC BLOOD PRESSURE: 130 MMHG

## 2017-08-15 DIAGNOSIS — N18.30 CKD (CHRONIC KIDNEY DISEASE) STAGE 3, GFR 30-59 ML/MIN (H): ICD-10-CM

## 2017-08-15 DIAGNOSIS — I63.9 CEREBROVASCULAR ACCIDENT (CVA), UNSPECIFIED MECHANISM (H): Primary | ICD-10-CM

## 2017-08-15 DIAGNOSIS — E04.1 THYROID NODULE: ICD-10-CM

## 2017-08-15 DIAGNOSIS — I65.21 CAROTID ARTERY STENOSIS, SYMPTOMATIC, RIGHT: ICD-10-CM

## 2017-08-15 DIAGNOSIS — M25.551 HIP PAIN, RIGHT: ICD-10-CM

## 2017-08-15 DIAGNOSIS — I10 ESSENTIAL HYPERTENSION: ICD-10-CM

## 2017-08-15 PROCEDURE — 99309 SBSQ NF CARE MODERATE MDM 30: CPT | Performed by: NURSE PRACTITIONER

## 2017-08-15 RX ORDER — ISRADIPINE 5 MG/1
5 CAPSULE ORAL 2 TIMES DAILY
COMMUNITY
End: 2019-01-04

## 2017-08-15 NOTE — PROGRESS NOTES
[facility]    Chief Complaint   Patient presents with     Nursing Home Acute       HPI:    Jazmin Clifton is a 84 year old  (12/30/1932), who is being seen today for an episodic care visit at [facility] .  HPI information obtained from: facility chart records, facility staff and patient report.Today's concern is:  Cerebrovascular accident (CVA), unspecified mechanism (H)  Carotid artery stenosis, symptomatic, right  Has been seen by vascular, planning surgical intervention but not on schedule yet    Essential hypertension  No headache, no dizziness. See BPs below.     CKD (chronic kidney disease) stage 3, GFR 30-59 ml/min  Creatinine   Date Value Ref Range Status   08/08/2017 1.36 (H) 0.52 - 1.04 mg/dL Final   ]     Hip pain, right  Ongoing R hip pain, concerning for bursitis      BP Readings from Last 6 Encounters:   08/15/17 130/69   08/09/17 (!) 170/97   08/08/17 160/67   08/03/17 138/73   08/01/17 158/78   07/28/17 168/70          ALLERGIES: Accupril; Ace inhibitors; Augmentin; Levofloxacin; Morphine; and Norvasc [amlodipine besylate]  Past Medical, Surgical, Family and Social History reviewed and updated in Interventional [facility].    Current Outpatient Prescriptions   Medication Sig Dispense Refill     isradipine (DYNACIRC) 5 MG capsule Take 5 mg by mouth 2 times daily       magnesium citrate solution Take by mouth daily as needed for other Give 240 cc by mouth as needed for BOWEL HEALTH Day 3: If no BM results from Fleets enema by 1800 - give 240 cc of Magnesium Citrate. If no results by day 4 - notify MD.       magnesium hydroxide (MILK OF MAGNESIA) 400 MG/5ML suspension Take 30 mLs by mouth daily as needed for constipation or heartburn       bisacodyl (DULCOLAX) 10 MG Suppository Place 10 mg rectally daily as needed for constipation       SODIUM PHOSPHATES RE Place 1 Dose rectally daily as needed Day 3: If no BM results from suppository by 1200 - administer Fleets enema RC per RSO.        clopidogrel (PLAVIX) 75 MG tablet Take 1 tablet (75 mg) by mouth daily 30 tablet 0     atorvastatin (LIPITOR) 40 MG tablet Take 1 tablet (40 mg) by mouth daily 30 tablet 3     isosorbide mononitrate (IMDUR) 30 MG 24 hr tablet Take 30 mg by mouth daily       Lactobacillus (ACIDOPHILUS PO) Take 1 capsule by mouth 2 times daily       Lidocaine-Menthol (ICY HOT LIDOCAINE PLUS MENTHOL EX) Externally apply topically 3 times daily Apply to hips       TRIMETHOPRIM PO Take 100 mg by mouth daily Give 1 tablet by mouth one time a day for preventative for UTI       Benzocaine-Glycerin-DM (CEPACOL DUAL RELIEF PO) Give 1 dose by mouth every 2 hours as needed for Sore throat Per RSO - package instructions 1 lozenge Q 2 hours.       Docusate Sodium (COLACE PO) Take 100 mg by mouth daily       Memantine HCl (NAMENDA PO) Take 5 mg by mouth daily       nystatin (MYCOSTATIN) 047018 UNIT/GM POWD Apply topically 2 times daily       OXYCODONE HCL PO Take 2.5 mg by mouth every 4 hours as needed Give 2.5 mg po Q 4 hrs prn pain       potassium chloride (KLOR-CON) 20 MEQ Packet Take 10 mEq by mouth daily Give 10 mEq by mouth one time a day for supplement       MAGNESIUM OXIDE PO Take 250 mg by mouth daily       calcium carb 1250 mg, 500 mg [place],/vitamin D 200 units (OSCAL WITH D) 500-200 MG-UNIT per tablet Take 1 tablet by mouth daily       tiotropium (SPIRIVA) 18 MCG capsule Inhale 18 mcg into the lungs daily       budesonide-formoterol (SYMBICORT) 80-4.5 MCG/ACT Inhaler Inhale 2 puffs into the lungs 2 times daily       Acetaminophen (TYLENOL PO) Take 1,000 mg by mouth 3 times daily        Furosemide (LASIX PO) Take 20 mg by mouth 2 times daily        Citalopram Hydrobromide (CELEXA PO) Take 20 mg by mouth daily        LISINOPRIL PO Take 20 mg by mouth daily        Gabapentin (NEURONTIN PO) Take 300 mg by mouth 2 times daily        Omeprazole (PRILOSEC PO) Take 20 mg by mouth every morning       cyanocobalamin (VITAMIN  B-12) 1000 MCG tablet  Take 1,000 mcg by mouth daily       Medications reviewed:  Medications reconciled to facility chart and changes were made to reflect current medications as identified as above med list. Below are the changes that were made:   Medications stopped since last EPIC medication reconciliation:   There are no discontinued medications.    Medications started since last [facility] medication reconciliation:  Orders Placed This Encounter   Medications     isradipine (DYNACIRC) 5 MG capsule     Sig: Take 5 mg by mouth 2 times daily         REVIEW OF SYSTEMS:  4 point ROS including Respiratory, CV, GI and , other than that noted in the HPI,  is negative    Physical Exam:  /69  Pulse 70  Temp 98  F (36.7  C)  Resp 18  Wt 190 lb (86.2 kg)  SpO2 97%  BMI 33.66 kg/m2  GENERAL APPEARANCE:  Alert, in no acute distress  HEAD:  Normal, normocephalic, atraumatic  EYE EXAM: normal external eye, conjunctiva, lids, PAPI  NECK EXAM: supple, no JVD  CHEST/RESP:  respiratory effort normal , no respiratory distress  M/S:   extremities normal, gait normal-with rolling walker , normal muscle tone, and range of motion normal   NEUROLOGIC EXAM: Normal gross motor movement, tone and coordination. No tremor.  Cranial nerves 2-12 are normal tested and grossly at patient's baseline  PSYCH:  Alert and oriented to self and surroundings with forgetfulness , affect pleasant , judgement appropriate     Recent Labs:    Results for orders placed or performed in visit on 08/14/17   TSH   Result Value Ref Range    TSH 0.62 0.40 - 4.00 mU/L         Assessment/Plan:  Cerebrovascular accident (CVA), unspecified mechanism (H)  No further symptoms, working with therapy and improving. Planning assisted living facility placement in the near future    Carotid artery stenosis, symptomatic, right  Working with vascular, awaiting surgical follow up.     Essential hypertension  BP somewhat improved, still trending higher than goal systolic<130. Will increase  lisinopril a bit. She greatly dislikes increase in diuretic as she has to urinate so frequently    CKD (chronic kidney disease) stage 3, GFR 30-59 ml/min  Avoid nephrotoxic medications, Renal dosing of medications, monitor kidney function routinely.      Hip pain, right  Ongoing R hip pain, concerning for bursitis. She is willing to consider injection to R hip for pain control. Will have Dr. Shafer evaluate for this later this week.     Thyroid nodule  TSH within normal limits, L thyroid nodule noted 2.3x2.4x3.2 cm on US Carotid dated 7/26/17.       Orders:  1. DC lisinopril 20  2. Lisinopril 30 mg po QD Dx HTN      Electronically signed by  Kim Hidalgo CNP   [facility]  [phone] cell

## 2017-08-16 PROBLEM — N18.4 CKD (CHRONIC KIDNEY DISEASE) STAGE 4, GFR 15-29 ML/MIN (H): Status: ACTIVE | Noted: 2017-08-16

## 2017-08-16 PROBLEM — N39.0 URINARY TRACT INFECTION WITHOUT HEMATURIA, SITE UNSPECIFIED: Status: RESOLVED | Noted: 2017-06-20 | Resolved: 2017-08-16

## 2017-08-17 VITALS
DIASTOLIC BLOOD PRESSURE: 57 MMHG | SYSTOLIC BLOOD PRESSURE: 126 MMHG | TEMPERATURE: 97.4 F | OXYGEN SATURATION: 91 % | HEART RATE: 54 BPM | RESPIRATION RATE: 20 BRPM | BODY MASS INDEX: 33.48 KG/M2 | WEIGHT: 189 LBS

## 2017-08-17 NOTE — PROGRESS NOTES
[facility]    Chief Complaint   Patient presents with     Nursing Home Acute       HPI:    Jazmin Clifton is a 84 year old  (12/30/1932), who is being seen today for an episodic care visit at [facility] .  HPI information obtained from: facility chart records, facility staff and patient report.    Today's concern is:  Trochanteric bursitis of right hip  Trigger point of extremity  - Pt c/o intractable pain in the right hip and thigh areas, aching, affects her ADLs, worse with movements. Was seen by RIMA Hidalgo, and felt to have right greater trochanter. Pt is requesting to be injected today.     Rash  - Reports noticed a rash area over left eyelid and concerned about shingles- had it before. Reports immense itching.       ALLERGIES: Accupril; Ace inhibitors; Augmentin; Levofloxacin; Morphine; and Norvasc [amlodipine besylate]  Past Medical, Surgical, Family and Social History reviewed and updated in EPIC.  Medications reviewed: in the chart and EHR.     REVIEW OF SYSTEMS:  4 point ROS including Respiratory, CV, GI and , other than that noted in the HPI,  is negative    Physical Exam:  /57  Pulse 54  Temp 97.4  F (36.3  C)  Resp 20  Wt 189 lb (85.7 kg)  SpO2 91%  BMI 33.48 kg/m2  GENERAL APPEARANCE:  Alert, morbidly obese, cooperative  M/S:   Gait and station abnormal limbs on the right side. Tenderness over R greater trochanter , multiple trigger tender points over right Iliotibial tract and tensor fascia nayeli.   SKIN:  rash present, type:patches, appearance: slightly red, location: left, forehead   Neuro: no NFD observed.     Recent Labs:    Results for orders placed or performed in visit on 08/14/17   TSH   Result Value Ref Range    TSH 0.62 0.40 - 4.00 mU/L         Assessment/Plan:  Trochanteric bursitis of right hip  Trigger point of extremity  - AFter the assessment, it is felt that the pt will benefit from right greater trochanter injection, and TPI of right  iliotibial tract and tensor fascia nayeli on the right side.   - Cons and pros of the procedure explained to the Resident, including bleeding, and infection, reaction to meds. Pt gave verbal consent in the presence of Nurse Manager Sandra.   - After informed consent obtained- and nder aseptic technique, 40 mg of methylprednisolone mixed with 9 cc o 1% lidocaine were  Mixed, 2 cc injected into the right greater trochanter bursa, rest injected over multiple tender point over right ITT and TFL in equal increment. . Patient tolerated procedure well. Hemostasis implemented, no complication seen. Resident felt improvement in the pain intensity and rate pain 0/10. .    The following medication was given:     MEDICATION: methyprednisolone  ROUTE: IM  LOT #: [id number]  :  [employer]   EXPIRATION DATE:  2018    Rash  - likley insect bite vs contact dermatitis. Will tx with benadryl topical. Follow.       Orders:  ---------  - ice injected site tid for 2 days  - benadryl topical.    Electronically signed by  Toño Shafer MD

## 2017-08-18 ENCOUNTER — NURSING HOME VISIT (OUTPATIENT)
Dept: GERIATRICS | Facility: CLINIC | Age: 82
End: 2017-08-18
Payer: COMMERCIAL

## 2017-08-18 DIAGNOSIS — M79.609 TRIGGER POINT OF EXTREMITY: ICD-10-CM

## 2017-08-18 DIAGNOSIS — R21 RASH: ICD-10-CM

## 2017-08-18 DIAGNOSIS — M70.61 TROCHANTERIC BURSITIS OF RIGHT HIP: Primary | ICD-10-CM

## 2017-08-18 PROCEDURE — 99309 SBSQ NF CARE MODERATE MDM 30: CPT | Mod: 25 | Performed by: FAMILY MEDICINE

## 2017-08-18 PROCEDURE — 99207 ZZC CDG-CORRECTLY CODED, REVIEWED AND AGREE: CPT | Performed by: FAMILY MEDICINE

## 2017-08-18 PROCEDURE — 20552 NJX 1/MLT TRIGGER POINT 1/2: CPT | Mod: 59 | Performed by: FAMILY MEDICINE

## 2017-08-18 PROCEDURE — 20610 DRAIN/INJ JOINT/BURSA W/O US: CPT | Performed by: FAMILY MEDICINE

## 2017-08-21 NOTE — PROGRESS NOTES
"83 y/o female admited to hospital 7/28 with visual symptoms and transientrweakness left hand no CVA on CTA or MRI.  Symptoms now resolved U/S of carotid arteries showed 78% lesion of the right ICA.  Years ago she had a \"clipping\" of right cerebral aneurysms.  Motor/sensory intact  Cr. N. 2-12 intact  Carotids 4/4 right bruit.  Presently taking Plavix and ASA.  She has CKD stage three and does not wish imaging dye.  Would recommend right carotid endarterectomy,  Discussed risks goals and alternatives in detail.  She appears to understand and wishes to proceed.  Face to face time 45 minutes greater than 50% in consultation.  "

## 2017-08-22 ENCOUNTER — NURSING HOME VISIT (OUTPATIENT)
Dept: GERIATRICS | Facility: CLINIC | Age: 82
End: 2017-08-22
Payer: COMMERCIAL

## 2017-08-22 VITALS
TEMPERATURE: 98.3 F | HEART RATE: 57 BPM | SYSTOLIC BLOOD PRESSURE: 163 MMHG | DIASTOLIC BLOOD PRESSURE: 74 MMHG | WEIGHT: 189 LBS | OXYGEN SATURATION: 98 % | BODY MASS INDEX: 33.48 KG/M2 | RESPIRATION RATE: 18 BRPM

## 2017-08-22 DIAGNOSIS — M70.61 TROCHANTERIC BURSITIS OF RIGHT HIP: ICD-10-CM

## 2017-08-22 DIAGNOSIS — I65.21 CAROTID ARTERY STENOSIS, SYMPTOMATIC, RIGHT: ICD-10-CM

## 2017-08-22 DIAGNOSIS — M62.81 GENERALIZED MUSCLE WEAKNESS: ICD-10-CM

## 2017-08-22 DIAGNOSIS — J44.9 CHRONIC OBSTRUCTIVE PULMONARY DISEASE, UNSPECIFIED COPD TYPE (H): ICD-10-CM

## 2017-08-22 DIAGNOSIS — I10 ESSENTIAL HYPERTENSION, BENIGN: ICD-10-CM

## 2017-08-22 DIAGNOSIS — E04.1 THYROID NODULE: ICD-10-CM

## 2017-08-22 DIAGNOSIS — R42 DIZZINESS: ICD-10-CM

## 2017-08-22 DIAGNOSIS — I50.9 CHRONIC CONGESTIVE HEART FAILURE, UNSPECIFIED CONGESTIVE HEART FAILURE TYPE: ICD-10-CM

## 2017-08-22 DIAGNOSIS — I63.9 CEREBROVASCULAR ACCIDENT (CVA), UNSPECIFIED MECHANISM (H): Primary | ICD-10-CM

## 2017-08-22 DIAGNOSIS — I67.1 CEREBRAL ANEURYSM, NONRUPTURED: ICD-10-CM

## 2017-08-22 DIAGNOSIS — M79.609 TRIGGER POINT OF EXTREMITY: ICD-10-CM

## 2017-08-22 DIAGNOSIS — I95.9 HYPOTENSION, UNSPECIFIED HYPOTENSION TYPE: ICD-10-CM

## 2017-08-22 DIAGNOSIS — M16.11 OSTEOARTHRITIS OF RIGHT HIP, UNSPECIFIED OSTEOARTHRITIS TYPE: ICD-10-CM

## 2017-08-22 DIAGNOSIS — N39.0 URINARY TRACT INFECTION WITHOUT HEMATURIA, SITE UNSPECIFIED: ICD-10-CM

## 2017-08-22 PROCEDURE — 99316 NF DSCHRG MGMT 30 MIN+: CPT | Performed by: NURSE PRACTITIONER

## 2017-08-22 NOTE — PROGRESS NOTES
[place] [facility] DISCHARGE SUMMARY    PATIENT'S NAME: Jazmin Clifton  YOB: 1932  MEDICAL RECORD NUMBER:  [id number]    PRIMARY CARE PROVIDER AND CLINIC RESPONSIBLE AFTER TRANSFER: Mirian Aguilera [facility] [id number] CATIE JAQUEZ / CATIE WEAVER *     CODE STATUS/ADVANCE DIRECTIVES DISCUSSION:   CPR/Full code        Allergies   Allergen Reactions     Accupril      Ace Inhibitors      Accupril     Augmentin      Levofloxacin      Morphine      Norvasc [Amlodipine Besylate]      Leg swelling         TRANSFERRING PROVIDERS: Kim Hidalgo CNP, Toño Shafer MD   DATE OF SNF ADMISSION:  July / 28 / 2017  DATE OF SNF (anticipated) DISCHARGE: August / 23 / 2017  DISCHARGE DISPOSITION: Non-FMG Provider   Nursing Facility: [facility] stay 7/26/17 to 7/28/17.     Condition on Discharge:  Improving.  Function:  Ambulatory with rolling walker independently.   Cognitive Scores: SLUMS 21/30    Equipment: Rolling Walker    DISCHARGE DIAGNOSIS:   1. Cerebrovascular accident (CVA), unspecified mechanism (H)    2. Carotid artery stenosis, symptomatic, right    3. Cerebral aneurysm, nonruptured    4. Dizziness    5. Chronic congestive heart failure, unspecified congestive heart failure type (H)    6. Essential hypertension, benign    7. Hypotension, unspecified hypotension type    8. Thyroid nodule    9. Chronic obstructive pulmonary disease, unspecified COPD type (H)    10. Osteoarthritis of right hip, unspecified osteoarthritis type    11. Trochanteric bursitis of right hip    12. Trigger point of extremity    13. Generalized muscle weakness    14. Urinary tract infection without hematuria, site unspecified        HPI Nursing Facility Course:  HPI information obtained from: facility chart records, facility staff and patient report.  April 2017-Sepsis due to UTI and COPD exacerbation, admitted to [facility] and went  home after a short TCU stay at [facility] in good condition.   May 2017-UTI again and admitted to [facility], again home but with ongoing weakness so went to PCP, found to have hypotension and generalized weakness. She was then readmitted to [facility] for resumption of therapy services.     Cerebrovascular accident (CVA), unspecified mechanism (H)  She was readmitted to [facility] TCU on 7/21/17 after frequent re-hospitalizations as described above. She developed one episode of syncope while in TCU, suspected to be a TIA. She was seen in ED at [facility] but not admitted, no scans done and returned to TCU. Then, on 7/26/17, she was noted to have sudden onset of L facial droop, L sided weakness, drooling. She was then seen at [facility] where several scans, including CT head, MRA neck, Carotid ultrasound, MR brain. Noted to have R carotid stenosis greater than 70%. This was likely reason for CVA. She has recovered well, with minimal residual side effects. She is ambulatory, no sided weakness, no facial droop.     Carotid artery stenosis, symptomatic, right  Has been seen by Vascular Surgery at [facility] , Dr. TIM Parry and R carotid endarterectomy has been planned for September. She will need preop H&P prior to this procedure.     Cerebral aneurysm, nonruptured  Reported to have R middle cerebral artery region cerebral aneurysm clip/repair several years ago by Dr. Arango at [facility]. Follows yearly with scans.     Dizziness  No further dizziness, no further syncope/falls. She is feeling well    Chronic congestive heart failure, unspecified congestive heart failure type (H)  Chronic LE edema, well managed on current regimen. Has no dyspnea, no cough, no orthopnea.     Essential hypertension, benign  Generally normotensive on current regimen, goal BP <140/90 to reduce risk of falls, hypotension. The current medical regimen is  effective;  continue present plan and medications. Does have occasional elevated BP, on medications as noted.   BP Readings from Last 6 Encounters:   08/22/17 163/74   08/17/17 126/57   08/15/17 130/69   08/09/17 (!) 170/97   08/08/17 160/67   08/03/17 138/73       Thyroid nodule  Incidental finding on MRA of thyroid nodule 2.3x2.4x3.2 cm.  She reports she has had x2/3 thyroid cysts removed in the last 5 years, at [facility].    TSH   Date Value Ref Range Status   08/14/2017 0.62 0.40 - 4.00 mU/L Final   ]     Chronic obstructive pulmonary disease, unspecified COPD type (H)  She has had chronic COPD, on routine spiriva and no other chronic medications. Does have nebulizer at home which she has had for many years.     Osteoarthritis of right hip, unspecified osteoarthritis type  Reports of chronic R hip pain, has previously had injections and was using oxycodone for ongoing pain control.  Evaluated and thought to have bursitis.     Trochanteric bursitis of right hip  Trigger point of extremity  PE + for troch bursitis of R hip. Now sp 9 trigger point injections on 8/18/17 in R hip and R hip pain totally resolved.     Generalized muscle weakness  Has improved in strength and stability, now walking with rolling walker independently.     Urinary tract infection without hematuria, site unspecified  On chronic trimethoprim, no UTI while in TCU. Did have several UTI prior to admission to TCU.       PAST MEDICAL HISTORY:  has a past medical history of ABDOMINAL PAIN GENERALIZED (3/15/2006); Abdominal pain, generalized (3/15/2006); Atherosclerosis of renal artery (H); BENIGN HYPERTENSION (5/1/2006); Benign neoplasm of scalp and skin of neck; Cardiac dysrhythmias NEC; Cerebral aneurysm, nonruptured; Depressive disorder, not elsewhere classified; Esophageal reflux; Female stress incontinence; 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    DISCHARGE MEDICATIONS:  Current Outpatient Prescriptions   Medication Sig Dispense Refill     diphenhydrAMINE (BENADRYL) 2 % cream Apply topically 3 times daily as needed for itching Apply to rash on forehead       isradipine (DYNACIRC) 5 MG capsule Take 5 mg by mouth 2 times daily       magnesium citrate solution Take by mouth daily as needed for other Give 240 cc by mouth as needed for BOWEL HEALTH Day 3: If no BM results from Fleets enema by 1800 - give 240 cc of Magnesium Citrate. If no results by day 4 - notify MD.       magnesium hydroxide (MILK OF MAGNESIA) 400 MG/5ML suspension Take 30 mLs by mouth daily as needed for constipation or heartburn       bisacodyl (DULCOLAX) 10 MG Suppository Place 10 mg rectally daily as needed for constipation       SODIUM PHOSPHATES RE Place 1 Dose rectally daily as needed Day 3: If no BM results from suppository by 1200 - administer Fleets enema RC per RSO.       clopidogrel (PLAVIX) 75 MG tablet Take 1 tablet (75 mg) by mouth daily 30 tablet 0     atorvastatin (LIPITOR) 40 MG tablet Take 1 tablet (40 mg) by mouth daily 30 tablet 3     isosorbide mononitrate (IMDUR) 30 MG 24 hr tablet Take 30 mg by mouth daily       Lactobacillus (ACIDOPHILUS PO) Take 1 capsule by mouth 2 times daily       Lidocaine-Menthol (ICY HOT LIDOCAINE PLUS MENTHOL EX) Externally apply topically 3 times daily Apply to hips       TRIMETHOPRIM PO Take 100 mg by mouth daily Give 1 tablet by mouth one time a day for preventative for UTI       Benzocaine-Glycerin-DM (CEPACOL DUAL RELIEF PO) Give 1 dose by mouth every 2 hours as needed for Sore throat Per RSO - package instructions 1 lozenge Q 2 hours.       Docusate Sodium (COLACE PO) Take 100 mg by mouth daily       Memantine HCl (NAMENDA PO) Take 5 mg by mouth daily       nystatin  (MYCOSTATIN) 935419 UNIT/GM POWD Apply topically 2 times daily       potassium chloride (KLOR-CON) 20 MEQ Packet Take 10 mEq by mouth daily Give 10 mEq by mouth one time a day for supplement       MAGNESIUM OXIDE PO Take 250 mg by mouth daily       calcium carb 1250 mg, 500 mg [place],/vitamin D 200 units (OSCAL WITH D) 500-200 MG-UNIT per tablet Take 1 tablet by mouth daily       tiotropium (SPIRIVA) 18 MCG capsule Inhale 18 mcg into the lungs daily       Acetaminophen (TYLENOL PO) Take 1,000 mg by mouth 3 times daily        Furosemide (LASIX PO) Take 20 mg by mouth 2 times daily        Citalopram Hydrobromide (CELEXA PO) Take 20 mg by mouth daily        LISINOPRIL PO Take 30 mg by mouth daily        Gabapentin (NEURONTIN PO) Take 300 mg by mouth 2 times daily        Omeprazole (PRILOSEC PO) Take 20 mg by mouth every morning       cyanocobalamin (VITAMIN  B-12) 1000 MCG tablet Take 1,000 mcg by mouth daily         MEDICATION CHANGES/RATIONALE:   Added plavix and increased atorvastatin after CVA  Oxycodone discontinued after trigger point injections of R hip   symbicort discontinued as it causes mouth/tongue pain    Controlled medications sent with patient: not applicable/none     Review of Systems:  No CP, SOB, Cough, dizziness, fevers, chills, HA, N/V, dysuria or Bowel Abnormalities. Appetite is good.  Pain none    /74  Pulse 57  Temp 98.3  F (36.8  C)  Resp 18  Wt 189 lb (85.7 kg)  SpO2 98%  BMI 33.48 kg/m2    Physical Exam:  A & O x 3 with mild forgetfulness, NAD. Lungs CTA, non labored. RRR, S1/S2 w/o murmur,gallop or rub.  Trace edema.      DISCHARGE PLAN:  Occupational Therapy, Physical Therapy, Registered Nurse, Home Health Aide and From:  [facility]  Patient instructed to follow-up with:  PCP in 1-2 weeks       [facility] scheduled appointments:  Future Appointments  Date Time Provider Department Center   9/25/2017 9:30 AM Heather Whitaker MD [facility]       [facility] referral needed and placed by  this provider: No    Pending labs: none  SNF labs   Results for orders placed or performed in visit on 08/14/17   TSH   Result Value Ref Range    TSH 0.62 0.40 - 4.00 mU/L     Last Basic Metabolic Panel:  Lab Results   Component Value Date     08/08/2017      Lab Results   Component Value Date    POTASSIUM 4.6 08/08/2017     Lab Results   Component Value Date    CHLORIDE 109 08/08/2017     Lab Results   Component Value Date    BLAIR 9.3 08/08/2017     Lab Results   Component Value Date    CO2 28 08/08/2017     Lab Results   Component Value Date    BUN 30 08/08/2017     Lab Results   Component Value Date    CR 1.36 08/08/2017     Lab Results   Component Value Date    GLC 86 08/08/2017       Discharge Treatments:none    1. Please schedule appt for surgery with Dr Parry- vascular at [facility]- needs carotid endarterectomy.  2. DC symbicort.  3. DC Oxycodone  4. Ok to change packet K+ to pill form for ease of admin.      TOTAL DISCHARGE TIME:   Greater than 30 minutes  Electronically signed by:  Kim Hidalgo CNP   [facility]  [phone] cell     Documentation of Face to Face and Certification for Home Health Services    I certify that patient: Jazmin Clifton is under my care and that I, or a nurse practitioner or physician's assistant working with me, had a face-to-face encounter that meets the physician face-to-face encounter requirements with this patient on: 8/22/2017.    This encounter with the patient was in whole, or in part, for the following medical condition, which is the primary reason for home health care: CVA, CAD, HTN and dizziness..    I certify that, based on my findings, the following services are medically necessary home health services: Nursing, Occupational Therapy, Physical Therapy and HHA.    My clinical findings support the need for the above services because: Nurse is needed: To provide caregiver training to assist with: CVA, CAD, HTN and dizziness...., Occupational Therapy  Services are needed to assess and treat cognitive ability and address ADL safety due to impairment in CVA, CAD, HTN and dizziness..., Physical Therapy Services are needed to assess and treat the following functional impairments: CVA, CAD, HTN and dizziness... and HHA    Further, I certify that my clinical findings support that this patient is homebound (i.e. absences from home require considerable and taxing effort and are for medical reasons or [employer] services or infrequently or of short duration when for other reasons) because: Requires assistance of another person or specialized equipment to access medical services because patient: Requires supervision of another for safe transfer...    Based on the above findings. I certify that this patient is confined to the home and needs intermittent skilled nursing care, physical therapy and/or speech therapy.  The patient is under my care, and I have initiated the establishment of the plan of care.  This patient will be followed by a physician who will periodically review the plan of care.  Physician/Provider to provide follow up care: Mirian Aguilera    Attending hospital physician (the Medicare certified [place] provider): MARY Tejada CNP     Physician Signature: See electronic signature associated with these discharge orders.  Date: 8/22/2017    Kim Hidalgo CNP   [place] [facility]  [phone] cell

## 2017-08-23 PROBLEM — I10 ESSENTIAL HYPERTENSION: Status: RESOLVED | Noted: 2017-06-20 | Resolved: 2017-08-23

## 2017-08-23 PROBLEM — N39.0 URINARY TRACT INFECTION WITHOUT HEMATURIA, SITE UNSPECIFIED: Status: ACTIVE | Noted: 2017-08-23

## 2017-08-23 PROBLEM — M79.609 TRIGGER POINT OF EXTREMITY: Status: ACTIVE | Noted: 2017-08-23

## 2017-08-23 PROBLEM — M70.61 TROCHANTERIC BURSITIS OF RIGHT HIP: Status: ACTIVE | Noted: 2017-08-23

## 2017-08-23 PROBLEM — E04.1 THYROID NODULE: Status: ACTIVE | Noted: 2017-08-23

## 2017-08-27 ENCOUNTER — TELEPHONE (OUTPATIENT)
Dept: GERIATRICS | Facility: CLINIC | Age: 82
End: 2017-08-27

## 2017-08-27 NOTE — TELEPHONE ENCOUNTER
Patient with redness, warmth and swelling over left eye brow and now eyelid. Has been using Benadryl topically x one week some improvement but now getting worse. ?spider bite vs infection. No pain, no change in vision. Does report itching    PLAN  Bactrim DS 1 tab PO BID x 5 days diagnosis suspected cellulitis  Continue Benadryl topically but increase to QID until resolved  Cool packs to eye QID until resolved  Staff to update primary NP to status tomorrow    Electronically signed by MARY Hernandez, GNP

## 2017-08-28 ENCOUNTER — TRANSFERRED RECORDS (OUTPATIENT)
Dept: HEALTH INFORMATION MANAGEMENT | Facility: CLINIC | Age: 82
End: 2017-08-28

## 2017-08-29 NOTE — H&P (VIEW-ONLY)
[place] [facility]    Chief Complaint   Patient presents with     Nursing Home Acute       HPI:    Jazmin Clifton is a 84 year old  (12/30/1932), who is being seen today for an episodic care visit at [facility] .  HPI information obtained from: facility chart records, facility staff and patient report.Today's concern is:  Cerebrovascular accident (CVA), unspecified mechanism (H)  Carotid artery stenosis, symptomatic, right  Has been seen by vascular, planning surgical intervention but not on schedule yet    Essential hypertension  No headache, no dizziness. See BPs below.     CKD (chronic kidney disease) stage 3, GFR 30-59 ml/min  Creatinine   Date Value Ref Range Status   08/08/2017 1.36 (H) 0.52 - 1.04 mg/dL Final   ]     Hip pain, right  Ongoing R hip pain, concerning for bursitis      BP Readings from Last 6 Encounters:   08/15/17 130/69   08/09/17 (!) 170/97   08/08/17 160/67   08/03/17 138/73   08/01/17 158/78   07/28/17 168/70          ALLERGIES: Accupril; Ace inhibitors; Augmentin; Levofloxacin; Morphine; and Norvasc [amlodipine besylate]  Past Medical, Surgical, Family and Social History reviewed and updated in [place].    Current Outpatient Prescriptions   Medication Sig Dispense Refill     isradipine (DYNACIRC) 5 MG capsule Take 5 mg by mouth 2 times daily       magnesium citrate solution Take by mouth daily as needed for other Give 240 cc by mouth as needed for BOWEL HEALTH Day 3: If no BM results from Fleets enema by 1800 - give 240 cc of Magnesium Citrate. If no results by day 4 - notify MD.       magnesium hydroxide (MILK OF MAGNESIA) 400 MG/5ML suspension Take 30 mLs by mouth daily as needed for constipation or heartburn       bisacodyl (DULCOLAX) 10 MG Suppository Place 10 mg rectally daily as needed for constipation       SODIUM PHOSPHATES RE Place 1 Dose rectally daily as needed Day 3: If no BM results from suppository by 1200 - administer Fleets enema RC per RSO.        clopidogrel (PLAVIX) 75 MG tablet Take 1 tablet (75 mg) by mouth daily 30 tablet 0     atorvastatin (LIPITOR) 40 MG tablet Take 1 tablet (40 mg) by mouth daily 30 tablet 3     isosorbide mononitrate (IMDUR) 30 MG 24 hr tablet Take 30 mg by mouth daily       Lactobacillus (ACIDOPHILUS PO) Take 1 capsule by mouth 2 times daily       Lidocaine-Menthol (ICY HOT LIDOCAINE PLUS MENTHOL EX) Externally apply topically 3 times daily Apply to hips       TRIMETHOPRIM PO Take 100 mg by mouth daily Give 1 tablet by mouth one time a day for preventative for UTI       Benzocaine-Glycerin-DM (CEPACOL DUAL RELIEF PO) Give 1 dose by mouth every 2 hours as needed for Sore throat Per RSO - package instructions 1 lozenge Q 2 hours.       Docusate Sodium (COLACE PO) Take 100 mg by mouth daily       Memantine HCl (NAMENDA PO) Take 5 mg by mouth daily       nystatin (MYCOSTATIN) 185948 UNIT/GM POWD Apply topically 2 times daily       OXYCODONE HCL PO Take 2.5 mg by mouth every 4 hours as needed Give 2.5 mg po Q 4 hrs prn pain       potassium chloride (KLOR-CON) 20 MEQ Packet Take 10 mEq by mouth daily Give 10 mEq by mouth one time a day for supplement       MAGNESIUM OXIDE PO Take 250 mg by mouth daily       calcium carb 1250 mg, 500 mg [place],/vitamin D 200 units (OSCAL WITH D) 500-200 MG-UNIT per tablet Take 1 tablet by mouth daily       tiotropium (SPIRIVA) 18 MCG capsule Inhale 18 mcg into the lungs daily       budesonide-formoterol (SYMBICORT) 80-4.5 MCG/ACT Inhaler Inhale 2 puffs into the lungs 2 times daily       Acetaminophen (TYLENOL PO) Take 1,000 mg by mouth 3 times daily        Furosemide (LASIX PO) Take 20 mg by mouth 2 times daily        Citalopram Hydrobromide (CELEXA PO) Take 20 mg by mouth daily        LISINOPRIL PO Take 20 mg by mouth daily        Gabapentin (NEURONTIN PO) Take 300 mg by mouth 2 times daily        Omeprazole (PRILOSEC PO) Take 20 mg by mouth every morning       cyanocobalamin (VITAMIN  B-12) 1000 MCG tablet  Take 1,000 mcg by mouth daily       Medications reviewed:  Medications reconciled to facility chart and changes were made to reflect current medications as identified as above med list. Below are the changes that were made:   Medications stopped since last EPIC medication reconciliation:   There are no discontinued medications.    Medications started since last [facility] medication reconciliation:  Orders Placed This Encounter   Medications     isradipine (DYNACIRC) 5 MG capsule     Sig: Take 5 mg by mouth 2 times daily         REVIEW OF SYSTEMS:  4 point ROS including Respiratory, CV, GI and , other than that noted in the HPI,  is negative    Physical Exam:  /69  Pulse 70  Temp 98  F (36.7  C)  Resp 18  Wt 190 lb (86.2 kg)  SpO2 97%  BMI 33.66 kg/m2  GENERAL APPEARANCE:  Alert, in no acute distress  HEAD:  Normal, normocephalic, atraumatic  EYE EXAM: normal external eye, conjunctiva, lids, PAPI  NECK EXAM: supple, no JVD  CHEST/RESP:  respiratory effort normal , no respiratory distress  M/S:   extremities normal, gait normal-with rolling walker , normal muscle tone, and range of motion normal   NEUROLOGIC EXAM: Normal gross motor movement, tone and coordination. No tremor.  Cranial nerves 2-12 are normal tested and grossly at patient's baseline  PSYCH:  Alert and oriented to self and surroundings with forgetfulness , affect pleasant , judgement appropriate     Recent Labs:    Results for orders placed or performed in visit on 08/14/17   TSH   Result Value Ref Range    TSH 0.62 0.40 - 4.00 mU/L         Assessment/Plan:  Cerebrovascular accident (CVA), unspecified mechanism (H)  No further symptoms, working with therapy and improving. Planning assisted living facility placement in the near future    Carotid artery stenosis, symptomatic, right  Working with vascular, awaiting surgical follow up.     Essential hypertension  BP somewhat improved, still trending higher than goal systolic<130. Will increase  lisinopril a bit. She greatly dislikes increase in diuretic as she has to urinate so frequently    CKD (chronic kidney disease) stage 3, GFR 30-59 ml/min  Avoid nephrotoxic medications, Renal dosing of medications, monitor kidney function routinely.      Hip pain, right  Ongoing R hip pain, concerning for bursitis. She is willing to consider injection to R hip for pain control. Will have Dr. Shafer evaluate for this later this week.     Thyroid nodule  TSH within normal limits, L thyroid nodule noted 2.3x2.4x3.2 cm on US Carotid dated 7/26/17.       Orders:  1. DC lisinopril 20  2. Lisinopril 30 mg po QD Dx HTN      Electronically signed by  Kim Hidalgo CNP   [facility] [place] [facility]  [phone] cell

## 2017-08-31 ENCOUNTER — HOSPITAL ENCOUNTER (INPATIENT)
Facility: CLINIC | Age: 82
LOS: 1 days | Discharge: NURSING, REHAB OR LONG-TERM CARE | DRG: 038 | End: 2017-09-01
Attending: SURGERY | Admitting: SURGERY
Payer: MEDICARE

## 2017-08-31 ENCOUNTER — OFFICE VISIT (OUTPATIENT)
Dept: SURGERY | Facility: PHYSICIAN GROUP | Age: 82
End: 2017-08-31
Payer: COMMERCIAL

## 2017-08-31 ENCOUNTER — ANESTHESIA EVENT (OUTPATIENT)
Dept: SURGERY | Facility: CLINIC | Age: 82
DRG: 038 | End: 2017-08-31
Payer: MEDICARE

## 2017-08-31 ENCOUNTER — ANESTHESIA (OUTPATIENT)
Dept: SURGERY | Facility: CLINIC | Age: 82
DRG: 038 | End: 2017-08-31
Payer: MEDICARE

## 2017-08-31 DIAGNOSIS — I65.21 CAROTID STENOSIS, SYMPTOMATIC W/O INFARCT, RIGHT: ICD-10-CM

## 2017-08-31 DIAGNOSIS — I65.21 SYMPTOMATIC STENOSIS OF RIGHT CAROTID ARTERY WITHOUT INFARCTION: Primary | ICD-10-CM

## 2017-08-31 LAB
ABO + RH BLD: NORMAL
ABO + RH BLD: NORMAL
ANION GAP SERPL CALCULATED.3IONS-SCNC: 6 MMOL/L (ref 3–14)
BLD GP AB SCN SERPL QL: NORMAL
BLOOD BANK CMNT PATIENT-IMP: NORMAL
BUN SERPL-MCNC: 36 MG/DL (ref 7–30)
CALCIUM SERPL-MCNC: 9.5 MG/DL (ref 8.5–10.1)
CHLORIDE SERPL-SCNC: 107 MMOL/L (ref 94–109)
CHOLEST SERPL-MCNC: 110 MG/DL
CO2 SERPL-SCNC: 28 MMOL/L (ref 20–32)
CREAT SERPL-MCNC: 1.42 MG/DL (ref 0.52–1.04)
GFR SERPL CREATININE-BSD FRML MDRD: 35 ML/MIN/1.7M2
GLUCOSE SERPL-MCNC: 102 MG/DL (ref 70–99)
HBA1C MFR BLD: 6.2 % (ref 4.3–6)
HDLC SERPL-MCNC: 61 MG/DL
LDLC SERPL CALC-MCNC: 28 MG/DL
NONHDLC SERPL-MCNC: 49 MG/DL
PLATELET # BLD AUTO: 172 10E9/L (ref 150–450)
POTASSIUM SERPL-SCNC: 5.3 MMOL/L (ref 3.4–5.3)
SODIUM SERPL-SCNC: 141 MMOL/L (ref 133–144)
SPECIMEN EXP DATE BLD: NORMAL
TRIGL SERPL-MCNC: 104 MG/DL

## 2017-08-31 PROCEDURE — 80048 BASIC METABOLIC PNL TOTAL CA: CPT | Performed by: SURGERY

## 2017-08-31 PROCEDURE — 25000566 ZZH SEVOFLURANE, EA 15 MIN: Performed by: SURGERY

## 2017-08-31 PROCEDURE — 25000128 H RX IP 250 OP 636: Performed by: SURGERY

## 2017-08-31 PROCEDURE — 35301 RECHANNELING OF ARTERY: CPT | Mod: RT | Performed by: SURGERY

## 2017-08-31 PROCEDURE — 95955 EEG DURING SURGERY: CPT

## 2017-08-31 PROCEDURE — S0077 INJECTION, CLINDAMYCIN PHOSP: HCPCS | Performed by: SURGERY

## 2017-08-31 PROCEDURE — 36000063 ZZH SURGERY LEVEL 4 EA 15 ADDTL MIN: Performed by: SURGERY

## 2017-08-31 PROCEDURE — 25000125 ZZHC RX 250: Performed by: SURGERY

## 2017-08-31 PROCEDURE — 25000128 H RX IP 250 OP 636: Performed by: NURSE ANESTHETIST, CERTIFIED REGISTERED

## 2017-08-31 PROCEDURE — 36000093 ZZH SURGERY LEVEL 4 1ST 30 MIN: Performed by: SURGERY

## 2017-08-31 PROCEDURE — 25000125 ZZHC RX 250: Performed by: NURSE ANESTHETIST, CERTIFIED REGISTERED

## 2017-08-31 PROCEDURE — 25000132 ZZH RX MED GY IP 250 OP 250 PS 637: Mod: GY | Performed by: SURGERY

## 2017-08-31 PROCEDURE — 03CM0ZZ EXTIRPATION OF MATTER FROM RIGHT EXTERNAL CAROTID ARTERY, OPEN APPROACH: ICD-10-PCS | Performed by: SURGERY

## 2017-08-31 PROCEDURE — 71000012 ZZH RECOVERY PHASE 1 LEVEL 1 FIRST HR: Performed by: SURGERY

## 2017-08-31 PROCEDURE — 71000013 ZZH RECOVERY PHASE 1 LEVEL 1 EA ADDTL HR: Performed by: SURGERY

## 2017-08-31 PROCEDURE — 25800025 ZZH RX 258: Performed by: SURGERY

## 2017-08-31 PROCEDURE — 27210995 ZZH RX 272: Performed by: SURGERY

## 2017-08-31 PROCEDURE — 27210794 ZZH OR GENERAL SUPPLY STERILE: Performed by: SURGERY

## 2017-08-31 PROCEDURE — 93005 ELECTROCARDIOGRAM TRACING: CPT

## 2017-08-31 PROCEDURE — 25000128 H RX IP 250 OP 636

## 2017-08-31 PROCEDURE — 40000169 ZZH STATISTIC PRE-PROCEDURE ASSESSMENT I: Performed by: SURGERY

## 2017-08-31 PROCEDURE — 93010 ELECTROCARDIOGRAM REPORT: CPT | Performed by: INTERNAL MEDICINE

## 2017-08-31 PROCEDURE — 36415 COLL VENOUS BLD VENIPUNCTURE: CPT | Performed by: SURGERY

## 2017-08-31 PROCEDURE — 40000885 ZZH STATISTIC STEP DOWN HRS EVENING

## 2017-08-31 PROCEDURE — 40000061 ZZH STATISTIC EEG TIME EA 10 MIN

## 2017-08-31 PROCEDURE — 03CH0ZZ EXTIRPATION OF MATTER FROM RIGHT COMMON CAROTID ARTERY, OPEN APPROACH: ICD-10-PCS | Performed by: SURGERY

## 2017-08-31 PROCEDURE — 03UK0JZ SUPPLEMENT RIGHT INTERNAL CAROTID ARTERY WITH SYNTHETIC SUBSTITUTE, OPEN APPROACH: ICD-10-PCS | Performed by: SURGERY

## 2017-08-31 PROCEDURE — 21400002 ZZH R&B CCU CICU CRITICAL

## 2017-08-31 PROCEDURE — 86900 BLOOD TYPING SEROLOGIC ABO: CPT | Performed by: SURGERY

## 2017-08-31 PROCEDURE — 85049 AUTOMATED PLATELET COUNT: CPT | Performed by: SURGERY

## 2017-08-31 PROCEDURE — 86850 RBC ANTIBODY SCREEN: CPT | Performed by: SURGERY

## 2017-08-31 PROCEDURE — 03UH0JZ SUPPLEMENT RIGHT COMMON CAROTID ARTERY WITH SYNTHETIC SUBSTITUTE, OPEN APPROACH: ICD-10-PCS | Performed by: SURGERY

## 2017-08-31 PROCEDURE — 37000009 ZZH ANESTHESIA TECHNICAL FEE, EACH ADDTL 15 MIN: Performed by: SURGERY

## 2017-08-31 PROCEDURE — A9270 NON-COVERED ITEM OR SERVICE: HCPCS | Mod: GY | Performed by: SURGERY

## 2017-08-31 PROCEDURE — 37000008 ZZH ANESTHESIA TECHNICAL FEE, 1ST 30 MIN: Performed by: SURGERY

## 2017-08-31 PROCEDURE — C1768 GRAFT, VASCULAR: HCPCS | Performed by: SURGERY

## 2017-08-31 PROCEDURE — 03CK0ZZ EXTIRPATION OF MATTER FROM RIGHT INTERNAL CAROTID ARTERY, OPEN APPROACH: ICD-10-PCS | Performed by: SURGERY

## 2017-08-31 PROCEDURE — 25000125 ZZHC RX 250

## 2017-08-31 PROCEDURE — 86901 BLOOD TYPING SEROLOGIC RH(D): CPT | Performed by: SURGERY

## 2017-08-31 PROCEDURE — 80061 LIPID PANEL: CPT | Performed by: SURGERY

## 2017-08-31 PROCEDURE — 83036 HEMOGLOBIN GLYCOSYLATED A1C: CPT | Performed by: SURGERY

## 2017-08-31 DEVICE — GRAFT HEMASHIELD 0.3X3" M002000195090: Type: IMPLANTABLE DEVICE | Status: FUNCTIONAL

## 2017-08-31 RX ORDER — ACETAMINOPHEN 325 MG/1
975 TABLET ORAL EVERY 8 HOURS
Status: DISCONTINUED | OUTPATIENT
Start: 2017-08-31 | End: 2017-09-01 | Stop reason: HOSPADM

## 2017-08-31 RX ORDER — TRIMETHOPRIM 100 MG/1
100 TABLET ORAL DAILY
Status: DISCONTINUED | OUTPATIENT
Start: 2017-08-31 | End: 2017-09-01 | Stop reason: HOSPADM

## 2017-08-31 RX ORDER — ONDANSETRON 4 MG/1
4 TABLET, ORALLY DISINTEGRATING ORAL EVERY 30 MIN PRN
Status: DISCONTINUED | OUTPATIENT
Start: 2017-08-31 | End: 2017-08-31 | Stop reason: HOSPADM

## 2017-08-31 RX ORDER — LANOLIN ALCOHOL/MO/W.PET/CERES
1000 CREAM (GRAM) TOPICAL DAILY
Status: DISCONTINUED | OUTPATIENT
Start: 2017-08-31 | End: 2017-09-01 | Stop reason: HOSPADM

## 2017-08-31 RX ORDER — ONDANSETRON 2 MG/ML
4 INJECTION INTRAMUSCULAR; INTRAVENOUS EVERY 30 MIN PRN
Status: DISCONTINUED | OUTPATIENT
Start: 2017-08-31 | End: 2017-08-31 | Stop reason: HOSPADM

## 2017-08-31 RX ORDER — ISOSORBIDE MONONITRATE 30 MG/1
30 TABLET, EXTENDED RELEASE ORAL DAILY
Status: DISCONTINUED | OUTPATIENT
Start: 2017-09-01 | End: 2017-09-01 | Stop reason: HOSPADM

## 2017-08-31 RX ORDER — POTASSIUM CHLORIDE 750 MG/1
10 TABLET, EXTENDED RELEASE ORAL DAILY
COMMUNITY
End: 2018-05-30

## 2017-08-31 RX ORDER — HEPARIN SODIUM 5000 [USP'U]/.5ML
5000 INJECTION, SOLUTION INTRAVENOUS; SUBCUTANEOUS EVERY 8 HOURS
Status: DISCONTINUED | OUTPATIENT
Start: 2017-09-01 | End: 2017-09-01 | Stop reason: HOSPADM

## 2017-08-31 RX ORDER — CEPHALEXIN 500 MG/1
500 CAPSULE ORAL 2 TIMES DAILY
Status: DISCONTINUED | OUTPATIENT
Start: 2017-08-31 | End: 2017-09-01 | Stop reason: HOSPADM

## 2017-08-31 RX ORDER — HEPARIN SODIUM 1000 [USP'U]/ML
INJECTION, SOLUTION INTRAVENOUS; SUBCUTANEOUS PRN
Status: DISCONTINUED | OUTPATIENT
Start: 2017-08-31 | End: 2017-08-31

## 2017-08-31 RX ORDER — TRIAMCINOLONE ACETONIDE 1 MG/G
CREAM TOPICAL 3 TIMES DAILY
COMMUNITY
End: 2018-05-30

## 2017-08-31 RX ORDER — FELODIPINE 10 MG/1
10 TABLET, EXTENDED RELEASE ORAL DAILY
Status: DISCONTINUED | OUTPATIENT
Start: 2017-09-01 | End: 2017-09-01 | Stop reason: HOSPADM

## 2017-08-31 RX ORDER — DOCUSATE SODIUM 100 MG/1
100 CAPSULE, LIQUID FILLED ORAL DAILY
Status: DISCONTINUED | OUTPATIENT
Start: 2017-08-31 | End: 2017-09-01 | Stop reason: HOSPADM

## 2017-08-31 RX ORDER — ACETAMINOPHEN 500 MG
1000 TABLET ORAL 3 TIMES DAILY
Status: DISCONTINUED | OUTPATIENT
Start: 2017-08-31 | End: 2017-08-31

## 2017-08-31 RX ORDER — DEXAMETHASONE SODIUM PHOSPHATE 4 MG/ML
INJECTION, SOLUTION INTRA-ARTICULAR; INTRALESIONAL; INTRAMUSCULAR; INTRAVENOUS; SOFT TISSUE PRN
Status: DISCONTINUED | OUTPATIENT
Start: 2017-08-31 | End: 2017-08-31

## 2017-08-31 RX ORDER — OXYCODONE HYDROCHLORIDE 5 MG/1
5 TABLET ORAL
Status: DISCONTINUED | OUTPATIENT
Start: 2017-08-31 | End: 2017-09-01 | Stop reason: HOSPADM

## 2017-08-31 RX ORDER — LIDOCAINE HYDROCHLORIDE 10 MG/ML
INJECTION, SOLUTION EPIDURAL; INFILTRATION; INTRACAUDAL; PERINEURAL PRN
Status: DISCONTINUED | OUTPATIENT
Start: 2017-08-31 | End: 2017-08-31 | Stop reason: HOSPADM

## 2017-08-31 RX ORDER — NALOXONE HYDROCHLORIDE 0.4 MG/ML
.1-.4 INJECTION, SOLUTION INTRAMUSCULAR; INTRAVENOUS; SUBCUTANEOUS
Status: DISCONTINUED | OUTPATIENT
Start: 2017-08-31 | End: 2017-09-01 | Stop reason: HOSPADM

## 2017-08-31 RX ORDER — GABAPENTIN 300 MG/1
300 CAPSULE ORAL 2 TIMES DAILY
Status: DISCONTINUED | OUTPATIENT
Start: 2017-08-31 | End: 2017-09-01 | Stop reason: HOSPADM

## 2017-08-31 RX ORDER — ONDANSETRON 4 MG/1
4 TABLET, ORALLY DISINTEGRATING ORAL EVERY 6 HOURS PRN
Status: DISCONTINUED | OUTPATIENT
Start: 2017-08-31 | End: 2017-09-01 | Stop reason: HOSPADM

## 2017-08-31 RX ORDER — GLYCOPYRROLATE 0.2 MG/ML
INJECTION, SOLUTION INTRAMUSCULAR; INTRAVENOUS PRN
Status: DISCONTINUED | OUTPATIENT
Start: 2017-08-31 | End: 2017-08-31

## 2017-08-31 RX ORDER — EPHEDRINE SULFATE 50 MG/ML
INJECTION, SOLUTION INTRAMUSCULAR; INTRAVENOUS; SUBCUTANEOUS PRN
Status: DISCONTINUED | OUTPATIENT
Start: 2017-08-31 | End: 2017-08-31

## 2017-08-31 RX ORDER — CLINDAMYCIN PHOSPHATE 900 MG/50ML
900 INJECTION, SOLUTION INTRAVENOUS
Status: COMPLETED | OUTPATIENT
Start: 2017-08-31 | End: 2017-08-31

## 2017-08-31 RX ORDER — FENTANYL CITRATE 50 UG/ML
INJECTION, SOLUTION INTRAMUSCULAR; INTRAVENOUS PRN
Status: DISCONTINUED | OUTPATIENT
Start: 2017-08-31 | End: 2017-08-31

## 2017-08-31 RX ORDER — CITALOPRAM HYDROBROMIDE 20 MG/1
20 TABLET ORAL DAILY
Status: DISCONTINUED | OUTPATIENT
Start: 2017-09-01 | End: 2017-09-01 | Stop reason: HOSPADM

## 2017-08-31 RX ORDER — ACETAMINOPHEN 325 MG/1
650 TABLET ORAL EVERY 4 HOURS PRN
Status: DISCONTINUED | OUTPATIENT
Start: 2017-09-03 | End: 2017-09-01 | Stop reason: HOSPADM

## 2017-08-31 RX ORDER — NEOSTIGMINE METHYLSULFATE 1 MG/ML
VIAL (ML) INJECTION PRN
Status: DISCONTINUED | OUTPATIENT
Start: 2017-08-31 | End: 2017-08-31

## 2017-08-31 RX ORDER — MAGNESIUM HYDROXIDE 1200 MG/15ML
LIQUID ORAL PRN
Status: DISCONTINUED | OUTPATIENT
Start: 2017-08-31 | End: 2017-08-31 | Stop reason: HOSPADM

## 2017-08-31 RX ORDER — ATORVASTATIN CALCIUM 40 MG/1
40 TABLET, FILM COATED ORAL DAILY
Status: DISCONTINUED | OUTPATIENT
Start: 2017-08-31 | End: 2017-09-01 | Stop reason: HOSPADM

## 2017-08-31 RX ORDER — CLOPIDOGREL BISULFATE 75 MG/1
75 TABLET ORAL DAILY
Status: DISCONTINUED | OUTPATIENT
Start: 2017-08-31 | End: 2017-09-01 | Stop reason: HOSPADM

## 2017-08-31 RX ORDER — PROCHLORPERAZINE MALEATE 5 MG
5 TABLET ORAL EVERY 6 HOURS PRN
Status: DISCONTINUED | OUTPATIENT
Start: 2017-08-31 | End: 2017-09-01 | Stop reason: HOSPADM

## 2017-08-31 RX ORDER — LABETALOL HYDROCHLORIDE 5 MG/ML
10 INJECTION, SOLUTION INTRAVENOUS EVERY 10 MIN PRN
Status: DISCONTINUED | OUTPATIENT
Start: 2017-08-31 | End: 2017-09-01 | Stop reason: HOSPADM

## 2017-08-31 RX ORDER — SODIUM CHLORIDE, SODIUM LACTATE, POTASSIUM CHLORIDE, CALCIUM CHLORIDE 600; 310; 30; 20 MG/100ML; MG/100ML; MG/100ML; MG/100ML
INJECTION, SOLUTION INTRAVENOUS CONTINUOUS
Status: DISCONTINUED | OUTPATIENT
Start: 2017-08-31 | End: 2017-08-31 | Stop reason: HOSPADM

## 2017-08-31 RX ORDER — ASPIRIN 600 MG/1
600 SUPPOSITORY RECTAL ONCE
Status: COMPLETED | OUTPATIENT
Start: 2017-08-31 | End: 2017-08-31

## 2017-08-31 RX ORDER — FUROSEMIDE 20 MG
20 TABLET ORAL 2 TIMES DAILY
Status: DISCONTINUED | OUTPATIENT
Start: 2017-09-01 | End: 2017-09-01 | Stop reason: HOSPADM

## 2017-08-31 RX ORDER — ONDANSETRON 2 MG/ML
4 INJECTION INTRAMUSCULAR; INTRAVENOUS EVERY 6 HOURS PRN
Status: DISCONTINUED | OUTPATIENT
Start: 2017-08-31 | End: 2017-09-01 | Stop reason: HOSPADM

## 2017-08-31 RX ORDER — TIOTROPIUM BROMIDE 18 UG/1
18 CAPSULE ORAL; RESPIRATORY (INHALATION) DAILY
Status: DISCONTINUED | OUTPATIENT
Start: 2017-09-01 | End: 2017-09-01 | Stop reason: HOSPADM

## 2017-08-31 RX ORDER — SODIUM CHLORIDE, SODIUM LACTATE, POTASSIUM CHLORIDE, CALCIUM CHLORIDE 600; 310; 30; 20 MG/100ML; MG/100ML; MG/100ML; MG/100ML
INJECTION, SOLUTION INTRAVENOUS CONTINUOUS PRN
Status: DISCONTINUED | OUTPATIENT
Start: 2017-08-31 | End: 2017-08-31

## 2017-08-31 RX ORDER — METOPROLOL TARTRATE 1 MG/ML
5 INJECTION, SOLUTION INTRAVENOUS EVERY 6 HOURS PRN
Status: DISCONTINUED | OUTPATIENT
Start: 2017-08-31 | End: 2017-09-01 | Stop reason: HOSPADM

## 2017-08-31 RX ORDER — SODIUM CHLORIDE 9 MG/ML
INJECTION, SOLUTION INTRAVENOUS CONTINUOUS
Status: DISCONTINUED | OUTPATIENT
Start: 2017-08-31 | End: 2017-09-01

## 2017-08-31 RX ORDER — ONDANSETRON 2 MG/ML
INJECTION INTRAMUSCULAR; INTRAVENOUS PRN
Status: DISCONTINUED | OUTPATIENT
Start: 2017-08-31 | End: 2017-08-31

## 2017-08-31 RX ORDER — PROPOFOL 10 MG/ML
INJECTION, EMULSION INTRAVENOUS PRN
Status: DISCONTINUED | OUTPATIENT
Start: 2017-08-31 | End: 2017-08-31

## 2017-08-31 RX ORDER — LIDOCAINE 40 MG/G
CREAM TOPICAL
Status: DISCONTINUED | OUTPATIENT
Start: 2017-08-31 | End: 2017-09-01 | Stop reason: HOSPADM

## 2017-08-31 RX ORDER — LIDOCAINE HYDROCHLORIDE 20 MG/ML
INJECTION, SOLUTION INFILTRATION; PERINEURAL PRN
Status: DISCONTINUED | OUTPATIENT
Start: 2017-08-31 | End: 2017-08-31

## 2017-08-31 RX ORDER — FENTANYL CITRATE 50 UG/ML
25-50 INJECTION, SOLUTION INTRAMUSCULAR; INTRAVENOUS
Status: DISCONTINUED | OUTPATIENT
Start: 2017-08-31 | End: 2017-08-31 | Stop reason: HOSPADM

## 2017-08-31 RX ORDER — MEMANTINE HYDROCHLORIDE 5 MG/1
5 TABLET ORAL DAILY
Status: DISCONTINUED | OUTPATIENT
Start: 2017-09-01 | End: 2017-09-01 | Stop reason: HOSPADM

## 2017-08-31 RX ADMIN — CLINDAMYCIN PHOSPHATE 900 MG: 18 INJECTION, SOLUTION INTRAVENOUS at 12:55

## 2017-08-31 RX ADMIN — Medication 5 MG: at 13:28

## 2017-08-31 RX ADMIN — PROPOFOL 90 MG: 10 INJECTION, EMULSION INTRAVENOUS at 12:44

## 2017-08-31 RX ADMIN — ASPIRIN 600 MG: 600 SUPPOSITORY RECTAL at 15:50

## 2017-08-31 RX ADMIN — LIDOCAINE HYDROCHLORIDE 60 MG: 20 INJECTION, SOLUTION INFILTRATION; PERINEURAL at 12:44

## 2017-08-31 RX ADMIN — SODIUM CHLORIDE, POTASSIUM CHLORIDE, SODIUM LACTATE AND CALCIUM CHLORIDE: 600; 310; 30; 20 INJECTION, SOLUTION INTRAVENOUS at 11:41

## 2017-08-31 RX ADMIN — DEXAMETHASONE SODIUM PHOSPHATE 4 MG: 4 INJECTION, SOLUTION INTRA-ARTICULAR; INTRALESIONAL; INTRAMUSCULAR; INTRAVENOUS; SOFT TISSUE at 13:00

## 2017-08-31 RX ADMIN — FENTANYL CITRATE 50 MCG: 50 INJECTION, SOLUTION INTRAMUSCULAR; INTRAVENOUS at 13:10

## 2017-08-31 RX ADMIN — PHENYLEPHRINE HYDROCHLORIDE 100 MCG: 10 INJECTION, SOLUTION INTRAMUSCULAR; INTRAVENOUS; SUBCUTANEOUS at 13:29

## 2017-08-31 RX ADMIN — ATORVASTATIN CALCIUM 40 MG: 40 TABLET, FILM COATED ORAL at 19:03

## 2017-08-31 RX ADMIN — Medication 10 MG: at 12:53

## 2017-08-31 RX ADMIN — TRIMETHOPRIM 100 MG: 100 TABLET ORAL at 20:43

## 2017-08-31 RX ADMIN — GABAPENTIN 300 MG: 300 CAPSULE ORAL at 20:06

## 2017-08-31 RX ADMIN — Medication 5 MG: at 13:29

## 2017-08-31 RX ADMIN — Medication 4000 UNITS: at 13:24

## 2017-08-31 RX ADMIN — CEPHALEXIN 500 MG: 500 CAPSULE ORAL at 20:06

## 2017-08-31 RX ADMIN — ONDANSETRON 4 MG: 2 INJECTION INTRAMUSCULAR; INTRAVENOUS at 14:42

## 2017-08-31 RX ADMIN — ACETAMINOPHEN 975 MG: 325 TABLET, FILM COATED ORAL at 19:02

## 2017-08-31 RX ADMIN — FENTANYL CITRATE 100 MCG: 50 INJECTION, SOLUTION INTRAMUSCULAR; INTRAVENOUS at 12:44

## 2017-08-31 RX ADMIN — ROCURONIUM BROMIDE 50 MG: 10 INJECTION INTRAVENOUS at 12:44

## 2017-08-31 RX ADMIN — PHENYLEPHRINE HYDROCHLORIDE 0.25 MCG/KG/MIN: 10 INJECTION, SOLUTION INTRAMUSCULAR; INTRAVENOUS; SUBCUTANEOUS at 12:53

## 2017-08-31 RX ADMIN — SODIUM CHLORIDE, POTASSIUM CHLORIDE, SODIUM LACTATE AND CALCIUM CHLORIDE: 600; 310; 30; 20 INJECTION, SOLUTION INTRAVENOUS at 12:48

## 2017-08-31 RX ADMIN — CLOPIDOGREL BISULFATE 75 MG: 75 TABLET ORAL at 18:57

## 2017-08-31 RX ADMIN — SODIUM CHLORIDE, POTASSIUM CHLORIDE, SODIUM LACTATE AND CALCIUM CHLORIDE: 600; 310; 30; 20 INJECTION, SOLUTION INTRAVENOUS at 14:44

## 2017-08-31 RX ADMIN — NEOSTIGMINE METHYLSULFATE 5 MG: 1 INJECTION INTRAMUSCULAR; INTRAVENOUS; SUBCUTANEOUS at 14:58

## 2017-08-31 RX ADMIN — GLYCOPYRROLATE 0.8 MG: 0.2 INJECTION, SOLUTION INTRAMUSCULAR; INTRAVENOUS at 14:58

## 2017-08-31 ASSESSMENT — COPD QUESTIONNAIRES
COPD: 1
CAT_SEVERITY: MILD

## 2017-08-31 ASSESSMENT — LIFESTYLE VARIABLES: TOBACCO_USE: 1

## 2017-08-31 ASSESSMENT — ACTIVITIES OF DAILY LIVING (ADL): SWALLOWING: 0-->SWALLOWS FOODS/LIQUIDS WITHOUT DIFFICULTY

## 2017-08-31 NOTE — IP AVS SNAPSHOT
MRN:[id number]                      After Visit Summary   8/31/2017    Jazmin Clifton    MRN: [id number]           Thank you!     Thank you for choosing [facility] for your care. Our goal is always to provide you with excellent care. Hearing back from our patients is one way we can continue to improve our services. Please take a few minutes to complete the written survey that you may receive in the mail after you visit with us. Thank you!        Patient Information     Date Of Birth          12/30/1932        About your hospital stay     You were admitted on:  August 31, 2017 You last received care in the:  [place] [address] [facility]    You were discharged on:  September 1, 2017        Reason for your hospital stay       Symptomatic Carotid artery Stenosis                  Who to Call     For medical emergencies, please call 911.  For non-urgent questions about your medical care, please call your primary care provider or clinic, [phone]  For questions related to your surgery, please call your surgery clinic        Attending Provider     Provider Hilario Apodaca MD Surgery       Primary Care Provider Office Phone # Fax #    Mirian Aguilera [phone]      After Care Instructions     Activity - Up with nursing assistance           Advance Diet as Tolerated       Follow this diet upon discharge: Orders Placed This Encounter      Advance Diet as Tolerated: Regular Diet Adult; Low Saturated Fat Diet            Fall precautions           General info for SNF       Length of Stay Estimate: Long Term Care  Condition at Discharge: Stable  Level of care:skilled   Rehabilitation Potential: Fair  Admission H&P remains valid and up-to-date: Yes  Recent Chemotherapy: N/A  Use Nursing Home Standing Orders: Yes            Mantoux instructions       Give two-step Mantoux (PPD) Per Facility Policy No (if no explain) Patient is resident.            Wound  care (specify)       Site:   Right Neck  Instructions:  Leave incision open to air. Dermabond is intact over skin.  May shower. Sponge over incision with soap and water. Do not scrub. Pat dry. Do not submerge underwater x 4 weeks.                  Follow-up Appointments     Follow Up and recommended labs and tests       Folllow-up with Dr. aPrry at [facility] in 2 weeks for postoperative check                  Your next 10 appointments already scheduled     Sep 25, 2017  9:30 AM CDT   New Visit with Heather Whitaker MD   [facility] ([facility])    [address] [address]   [phone]              Further instructions from your care team       Carotid Endartectomy  Post-Operative Instructions    Typical length of stay in the hospital is 1-2 days.  On occasion, an evening in the Intensive Care Unit may be required for blood pressure regulation.    Activity    Most people are able to resume normal activities 1-2 weeks after surgery.  The key is to gradually increase your level of activity as you are able.  It is not uncommon to feel easily fatigued - this will improve with time.      You should avoid heavy lifting more than 30 lbs for 1 week.      You may return to work when you feel able - typically 1-2 weeks after surgery, depending on the type of work you do.      You should not drive a car for 1 week after surgery.  In addition,  you can not be taking prescription strength pain medication and you must feel strong enough to drive safely.    Incision Care    You can shower or bathe 2 days after surgery - avoid rubbing and soaking your incision.      You may have strips of white tape called  steri-strips  across your incision; they should stay on for 5-7 days or until the ends start to curl up.  You can then remove the steri-strips, or we will remove them at your post-operative appointment.      Avoid shaving directly over the incision for 2-3  weeks.    Common Concerns    You may notice mild skin numbness on the side of your surgery in your neck, chin, face, and earlobe.  This is due to nerve  irritation  and will gradually resolve over the next several months.  Call our office if you experience any short-lasting episode of numbness or weakness affecting one side of your body.      Some bruising and firmness around you incision can be expected.  This will soften and resolve over the next few weeks.  You may notice that some discoloration spreads to an area lower than the incision, such as the shoulder, neck or upper chest.  Likewise, swelling can occur near the incision or below the chin.  Call our office if the swelling or bruising worsens, or if you have difficulty swallowing.    Diet    You may resume a regular diet before you leave the hospital.  You may find that it is best to try smaller, more frequent meals until your appetite returns.    Medications    You may be started on a blood thinner after surgery - typically Aspirin and / or Plavix.      You may be given a prescription for pain medication after surgery.  If you need to have this refilled, please call your pharmacy where you had it filled.  They will then call us for approval.  Please do not call after hours for a refill on pain medication.    Post-Operative Appointments    You need to see your surgeon in the clinic approximately 1-2 weeks after surgery.  Post-operative appointment:   Date: _____________________________  Time: ____________________________  Dr. ______________________________      You will have an ultrasound test and evaluation with your surgeon 90 days (3 months) after surgery.      We will notify you by mail when you are due to schedule further ultrasound testing and evaluation; typically this is done annually.     When You Should Call Our Office    Body aches, chills or temperature greater than 101      Incision redness or drainage      Loss of vision in one eye      Loss  "of strength / weakness in one side of your body      Severe headache      Difficulty with speech      If you will be having an invasive procedure, such as a colonoscopy or dental work within one year of your surgery, you may need to take an antibiotic prior to the procedure if you had a Dacron patch with your surgery; please call us so we can assist you with this.    Call our main number, [phone], for assistance with any concerns or questions.     Revised 2014    Pending Results     Date and Time Order Name Status Description    2017 1105 EKG 12-lead, tracing only Preliminary             Statement of Approval     Ordered          17 0742  I have reviewed and agree with all the recommendations and orders detailed in this document.  EFFECTIVE NOW     Approved and electronically signed by:  James Murdock MD             Admission Information     Date & Time Provider Department Dept. Phone    2017 Hilario Parry MD [place] [address] [facility] [phone]      Your Vitals Were     Blood Pressure Pulse Temperature Respirations Height Weight    138/63 (BP Location: Left arm) 61 97.8  F (36.6  C) (Oral) 20 1.6 m (5' 3\") 86 kg (189 lb 9.5 oz)    Pulse Oximetry BMI (Body Mass Index)                93% 33.59 kg/m2          [employer] Information     [employer] lets you send messages to your doctor, view your test results, renew your prescriptions, schedule appointments and more. To sign up, go to [email or web address] . Click on \"Log in\" on the left side of the screen, which will take you to the Welcome page. Then click on \"Sign up Now\" on the right side of the page.     You will be asked to enter the access code listed below, as well as some personal information. Please follow the directions to create your username and password.     Your access code is: [email or web address]  Expires: 10/26/2017  1:54 PM     Your access code will  in 90 days. If you need help or a new code, please " call your [facility] [place] clinic or [phone].        Care EveryWhere ID     This is your Care EveryWhere ID. This could be used by other organizations to access your [facility] [place] medical records  [id number]        Equal Access to Services     AISSATOU MCADAMS : Hadii aad ku hadgisella Sokeila, waaxda luqadaha, qaybta kaalmada adeyordanyada, anrdew keithjoon bonillayordan melendez brianna jameson. So [facility] [phone].    ATENCIÓN: Si habla español, tiene a ortega disposición servicios gratuitos de asistencia lingüística. Llame al [phone].    We comply with applicable federal civil rights laws and [place] laws. We do not discriminate on the basis of race, color, national origin, age, disability sex, sexual orientation or gender identity.               Review of your medicines      CONTINUE these medicines which may have CHANGED, or have new prescriptions. If we are uncertain of the size of tablets/capsules you have at home, strength may be listed as something that might have changed.        Dose / Directions    * TYLENOL PO   This may have changed:  Another medication with the same name was added. Make sure you understand how and when to take each.        Dose:  1000 mg   Take 1,000 mg by mouth 3 times daily (Takes 2 x 500 mg = 1000 mg)   Refills:  0       * acetaminophen 325 MG tablet   Commonly known as:  TYLENOL   This may have changed:  You were already taking a medication with the same name, and this prescription was added. Make sure you understand how and when to take each.   Used for:  Symptomatic stenosis of right carotid artery without infarction   Notes to Patient:  Ok to restart medication, medication not administered in the hospital        Dose:  650 mg   Start taking on:  9/3/2017   Take 2 tablets (650 mg) by mouth every 4 hours as needed for other (surgical pain)   Quantity:  100 tablet   Refills:  0       * Notice:  This list has 2 medication(s) that are the same as other medications prescribed for you. Read the directions  carefully, and ask your doctor or other care provider to review them with you.      CONTINUE these medicines which have NOT CHANGED        Dose / Directions    ACIDOPHILUS PO   Notes to Patient:  Ok to restart medication, not administered in the hospital.        Dose:  1 capsule   Take 1 capsule by mouth 2 times daily   Refills:  0       atorvastatin 40 MG tablet   Commonly known as:  LIPITOR   Used for:  Stenosis of right carotid artery        Dose:  40 mg   Take 1 tablet (40 mg) by mouth daily   Quantity:  30 tablet   Refills:  3       bisacodyl 10 MG Suppository   Commonly known as:  DULCOLAX        Dose:  10 mg   Place 10 mg rectally daily as needed for constipation   Refills:  0       BREO ELLIPTA 100-25 MCG/INH oral inhaler   Generic drug:  fluticasone-vilanterol        Dose:  1 puff   Inhale 1 puff into the lungs daily   Refills:  0       calcium-vitamin D 600-400 MG-UNIT per tablet   Commonly known as:  CALTRATE   Notes to Patient:  Ok to restart medication, medication not administered in the hospital        Dose:  1 tablet   Take 1 tablet by mouth daily   Refills:  0       CELEXA PO        Dose:  20 mg   Take 20 mg by mouth daily   Refills:  0       CEPACOL DUAL RELIEF PO   Notes to Patient:  Ok to restart medication, medication not administered in the hospital        Dose:  1 Dose   Take 1 Dose by mouth every 2 hours as needed (sore throat)   Refills:  0       clopidogrel 75 MG tablet   Commonly known as:  PLAVIX   Used for:  Facial droop        Dose:  75 mg   Take 1 tablet (75 mg) by mouth daily   Quantity:  30 tablet   Refills:  0       COLACE PO        Dose:  100 mg   Take 100 mg by mouth daily   Refills:  0       cyanocobalamin 1000 MCG tablet   Commonly known as:  vitamin  B-12        Dose:  1000 mcg   Take 1,000 mcg by mouth daily   Refills:  0       diphenhydrAMINE 2 % cream   Commonly known as:  BENADRYL   Notes to Patient:  Ok to restart medication, medication not administered in the hospital         Apply topically 3 times daily as needed for itching   Refills:  0       ICY HOT LIDOCAINE PLUS MENTHOL EX   Notes to Patient:  Ok to restart medication, medication not administered in the hospital        Externally apply topically 3 times daily Apply to hips   Refills:  0       isosorbide mononitrate 30 MG 24 hr tablet   Commonly known as:  IMDUR        Dose:  30 mg   Take 30 mg by mouth daily   Refills:  0       isradipine 5 MG capsule   Commonly known as:  DYNACIRC   Notes to Patient:  Ok to restart medication, medication not administered in the hospital        Dose:  5 mg   Take 5 mg by mouth 2 times daily   Refills:  0       KEFLEX PO   Indication:  Urinary Tract Infection        Dose:  500 mg   Take 500 mg by mouth 2 times daily   Refills:  0       LASIX PO        Dose:  20 mg   Take 20 mg by mouth 2 times daily   Refills:  0       LISINOPRIL PO        Dose:  30 mg   Take 30 mg by mouth daily   Refills:  0       magnesium citrate solution   Notes to Patient:  Ok to restart medication, medication not administered in the hospital        Take by mouth daily as needed for other Give 240 cc by mouth as needed for BOWEL HEALTH Day 3: If no BM results from Fleets enema by 1800 - give 240 cc of Magnesium Citrate. If no results by day 4 - notify MD.   Refills:  0       magnesium hydroxide 400 MG/5ML suspension   Commonly known as:  MILK OF MAGNESIA   Notes to Patient:  Ok to restart medication, medication not administered in the hospital        Dose:  30 mL   Take 30 mLs by mouth daily as needed for constipation or heartburn   Refills:  0       MAGNESIUM OXIDE PO   Notes to Patient:  Ok to restart medication, medication not administered in the hospital        Dose:  250 mg   Take 250 mg by mouth daily   Refills:  0       NAMENDA PO        Dose:  5 mg   Take 5 mg by mouth daily   Refills:  0       NEURONTIN PO        Dose:  300 mg   Take 300 mg by mouth 2 times daily   Refills:  0       nystatin 404612 UNIT/GM Powd    Commonly known as:  MYCOSTATIN   Notes to Patient:  Ok to restart medication, medication not administered in the hospital        Apply topically 2 times daily as needed   Refills:  0       potassium chloride 10 MEQ tablet   Generic drug:  potassium chloride   Notes to Patient:  Ok to restart medication, medication not administered in the hospital        Dose:  10 mEq   Take 10 mEq by mouth daily   Refills:  0       PRILOSEC PO        Dose:  20 mg   Take 20 mg by mouth every morning   Refills:  0       SODIUM PHOSPHATES RE   Notes to Patient:  Ok to restart medication, medication not administered in the hospital        Dose:  1 Dose   Place 1 Dose rectally daily as needed Day 3: If no BM results from suppository by 1200 - administer Fleets enema RC per RSO.   Refills:  0       tiotropium 18 MCG capsule   Commonly known as:  SPIRIVA        Dose:  18 mcg   Inhale 18 mcg into the lungs daily   Refills:  0       triamcinolone 0.1 % cream   Commonly known as:  KENALOG   Notes to Patient:  Ok to restart medication, medication not administered in the hospital        Apply topically 3 times daily   Refills:  0       TRIMETHOPRIM PO        Dose:  100 mg   Take 100 mg by mouth daily Give 1 tablet by mouth one time a day for preventative for UTI   Refills:  0            Where to get your medicines      Some of these will need a paper prescription and others can be bought over the counter. Ask your nurse if you have questions.     Bring a paper prescription for each of these medications     acetaminophen 325 MG tablet                Protect others around you: Learn how to safely use, store and throw away your medicines at www.disposemymeds.org.             Medication List: This is a list of all your medications and when to take them. Check marks below indicate your daily home schedule. Keep this list as a reference.      Medications           Morning Afternoon Evening Bedtime As Needed    ACIDOPHILUS PO   Take 1 capsule by  mouth 2 times daily   Notes to Patient:  Ok to restart medication, not administered in the hospital.                                   atorvastatin 40 MG tablet   Commonly known as:  LIPITOR   Take 1 tablet (40 mg) by mouth daily   Last time this was given:  40 mg on 9/1/2017  9:02 AM   Next Dose Due:  9/2/17                                 bisacodyl 10 MG Suppository   Commonly known as:  DULCOLAX   Place 10 mg rectally daily as needed for constipation                                   BREO ELLIPTA 100-25 MCG/INH oral inhaler   Inhale 1 puff into the lungs daily   Last time this was given:  1 puff on 9/1/2017  9:09 AM   Generic drug:  fluticasone-vilanterol   Next Dose Due:  9/2/17 am dose                                   calcium-vitamin D 600-400 MG-UNIT per tablet   Commonly known as:  CALTRATE   Take 1 tablet by mouth daily   Notes to Patient:  Ok to restart medication, medication not administered in the hospital                                   CELEXA PO   Take 20 mg by mouth daily   Last time this was given:  20 mg on 9/1/2017  9:02 AM   Next Dose Due:  9/2/17 am dose                                   CEPACOL DUAL RELIEF PO   Take 1 Dose by mouth every 2 hours as needed (sore throat)   Notes to Patient:  Ok to restart medication, medication not administered in the hospital                                   clopidogrel 75 MG tablet   Commonly known as:  PLAVIX   Take 1 tablet (75 mg) by mouth daily   Last time this was given:  75 mg on 9/1/2017  9:02 AM   Next Dose Due:  9/2/17 am dose                                   COLACE PO   Take 100 mg by mouth daily   Last time this was given:  100 mg on 9/1/2017  9:02 AM   Next Dose Due:  9/2/17 am dose                                   cyanocobalamin 1000 MCG tablet   Commonly known as:  vitamin  B-12   Take 1,000 mcg by mouth daily   Last time this was given:  1,000 mcg on 9/1/2017  9:02 AM   Next Dose Due:  9/2/17 am dose                                    diphenhydrAMINE 2 % cream   Commonly known as:  BENADRYL   Apply topically 3 times daily as needed for itching   Notes to Patient:  Ok to restart medication, medication not administered in the hospital                                   ICY HOT LIDOCAINE PLUS MENTHOL EX   Externally apply topically 3 times daily Apply to hips   Notes to Patient:  Ok to restart medication, medication not administered in the hospital                                         isosorbide mononitrate 30 MG 24 hr tablet   Commonly known as:  IMDUR   Take 30 mg by mouth daily   Last time this was given:  30 mg on 9/1/2017  9:02 AM   Next Dose Due:  9/2/17 am dose                                   isradipine 5 MG capsule   Commonly known as:  DYNACIRC   Take 5 mg by mouth 2 times daily   Next Dose Due:  9/1/17 pm dose   Notes to Patient:  Ok to restart medication, medication not administered in the hospital                                      KEFLEX PO   Take 500 mg by mouth 2 times daily   Last time this was given:  500 mg on 9/1/2017  9:02 AM   Next Dose Due:  9/1/17 pm dose                                      LASIX PO   Take 20 mg by mouth 2 times daily   Last time this was given:  20 mg on 9/1/2017  9:02 AM   Next Dose Due:  9/1/17 pm dose                                      LISINOPRIL PO   Take 30 mg by mouth daily   Last time this was given:  30 mg on 9/1/2017  9:02 AM   Next Dose Due:  9/2/17 am dose                                   magnesium citrate solution   Take by mouth daily as needed for other Give 240 cc by mouth as needed for BOWEL HEALTH Day 3: If no BM results from Fleets enema by 1800 - give 240 cc of Magnesium Citrate. If no results by day 4 - notify MD.   Notes to Patient:  Ok to restart medication, medication not administered in the hospital                                   magnesium hydroxide 400 MG/5ML suspension   Commonly known as:  MILK OF MAGNESIA   Take 30 mLs by mouth daily as needed for constipation or  heartburn   Notes to Patient:  Ok to restart medication, medication not administered in the hospital                                   MAGNESIUM OXIDE PO   Take 250 mg by mouth daily   Notes to Patient:  Ok to restart medication, medication not administered in the hospital                                   NAMENDA PO   Take 5 mg by mouth daily   Last time this was given:  5 mg on 9/1/2017 12:48 PM   Next Dose Due:  9/2/17 am dose                                   NEURONTIN PO   Take 300 mg by mouth 2 times daily   Last time this was given:  300 mg on 9/1/2017  9:02 AM   Next Dose Due:  9/1/17 pm dose                                      nystatin [id number] UNIT/GM Powd   Commonly known as:  MYCOSTATIN   Apply topically 2 times daily as needed   Notes to Patient:  Ok to restart medication, medication not administered in the hospital                                   potassium chloride 10 MEQ tablet   Take 10 mEq by mouth daily   Generic drug:  potassium chloride   Notes to Patient:  Ok to restart medication, medication not administered in the hospital                                   PRILOSEC PO   Take 20 mg by mouth every morning   Last time this was given:  20 mg on 9/1/2017  9:02 AM   Next Dose Due:  9/2/17 am dose                                   SODIUM PHOSPHATES RE   Place 1 Dose rectally daily as needed Day 3: If no BM results from suppository by 1200 - administer Fleets enema RC per RSO.   Notes to Patient:  Ok to restart medication, medication not administered in the hospital                                   tiotropium 18 MCG capsule   Commonly known as:  SPIRIVA   Inhale 18 mcg into the lungs daily   Last time this was given:  18 mcg on 9/1/2017  9:07 AM   Next Dose Due:  9/2/17 am dose                                   triamcinolone 0.1 % cream   Commonly known as:  KENALOG   Apply topically 3 times daily   Notes to Patient:  Ok to restart medication, medication not administered in the hospital                                          TRIMETHOPRIM PO   Take 100 mg by mouth daily Give 1 tablet by mouth one time a day for preventative for UTI   Last time this was given:  100 mg on 9/1/2017  9:02 AM   Next Dose Due:  9/2/17 am dose                                   * TYLENOL PO   Take 1,000 mg by mouth 3 times daily (Takes 2 x 500 mg = 1000 mg)   Last time this was given:  975 mg on 9/1/2017  9:01 AM   Next Dose Due:  9/1/17 evening and bedtime dose                                         * acetaminophen 325 MG tablet   Commonly known as:  TYLENOL   Take 2 tablets (650 mg) by mouth every 4 hours as needed for other (surgical pain)   Start taking on:  9/3/2017   Last time this was given:  975 mg on 9/1/2017  9:01 AM   Notes to Patient:  Ok to restart medication, medication not administered in the hospital                                   * Notice:  This list has 2 medication(s) that are the same as other medications prescribed for you. Read the directions carefully, and ask your doctor or other care provider to review them with you.

## 2017-08-31 NOTE — IP AVS SNAPSHOT
"` `     [place] [address] [facility]: [phone]                                              INTERAGENCY TRANSFER FORM - NURSING   2017                    Hospital Admission Date: 2017  VICKIE REID   : [id number]  Sex: Female        Attending Provider: Hilario Parry MD     Allergies:  Accupril, Ace Inhibitors, Augmentin, Levofloxacin, Morphine, Norvasc [Amlodipine Besylate]    Infection:  None   Service:  SURGERY    Ht:  1.6 m (5' 3\")   Wt:  86 kg (189 lb 9.5 oz)   Admission Wt:  85.7 kg (189 lb)    BMI:  33.59 kg/m 2   BSA:  1.96 m 2            Patient PCP Information     Provider PCP Type    Mirian Aguilera MD General      Current Code Status     Date Active Code Status Order ID Comments User Context       Prior      Code Status History     Date Active Date Inactive Code Status Order ID Comments User Context    2017  7:22 AM  Full Code [id number]  James Murdock MD Outpatient    2017  4:46 PM 2017  7:22 AM Full Code [id number]  James Murdock MD Inpatient    2017  3:43 PM 2017  4:46 PM Full Code [id number]  Kenton Blackwood MD Outpatient    2017  5:55 PM 2017  3:43 PM Full Code [id number]  Shamar Orellana MD Inpatient      Advance Directives        Does patient have a scanned Advance Directive/ACP document in EPIC?           Yes        Hospital Problems as of 2017              Priority Class Noted POA    Carotid stenosis, symptomatic w/o infarct, right Medium  2017 Yes      Non-Hospital Problems as of 2017              Priority Class Noted    Atherosclerosis of renal artery (H) Medium  Unknown    Esophageal reflux Medium  Unknown    Cerebral aneurysm, nonruptured Medium  Unknown    Cardiac dysrhythmias NEC Medium  Unknown    Essential hypertension, benign Medium  2006    Congenital cystic kidney disease Medium  2006    Benign neoplasm of colon Medium  10/4/2006    Renovascular hypertension Medium  2006    " CHF (congestive heart failure) (H) Medium  9/17/2008    Restrictive lung disease Medium  9/17/2008    CARDIOVASCULAR SCREENING; LDL GOAL LESS THAN 100 Medium  10/31/2010    Osteoporosis Medium  10/25/2011    S/P laminectomy Medium  10/25/2011    Hip joint replacement status Medium  4/18/2012    Osteoarthritis Medium  4/18/2012    DDD (degenerative disc disease), lumbar Medium  4/18/2012    Mild major depression (H) Medium  4/18/2012    Incontinence of urine Medium  4/18/2012    Chronic obstructive pulmonary disease, unspecified COPD type (H) Medium  7/25/2017    Generalized muscle weakness Medium  7/25/2017    Dizziness Medium  7/25/2017    Osteoarthritis of right hip, unspecified osteoarthritis type Medium  7/25/2017    Hypotension, unspecified hypotension type Medium  7/25/2017    CVA (cerebral vascular accident) (H) Medium  7/26/2017    Transient cerebral ischemia, unspecified type Medium  8/1/2017    Carotid artery stenosis, symptomatic, right Medium  8/1/2017    ACP (advance care planning) Medium  8/3/2017    CKD (chronic kidney disease) stage 3, GFR 30-59 ml/min Medium  8/16/2017    Thyroid nodule Medium  8/23/2017    Trigger point of extremity Medium  8/23/2017    Trochanteric bursitis of right hip Medium  8/23/2017    Urinary tract infection without hematuria, site unspecified Medium  8/23/2017      Immunizations     Name Date      Influenza (IIV3) 11/13/07     Influenza (IIV3) 11/09/06     Influenza (IIV3) 12/15/00     Influenza (IIV3) 10/26/99     Influenza (IIV3) 11/10/97          END      ASSESSMENT     Discharge Profile Flowsheet     EXPECTED DISCHARGE     Passing flatus  no 08/31/17 1920    Expected Discharge Date  09/01/17 (TCU) 09/01/17 0813   COMMUNICATION ASSESSMENT      DISCHARGE NEEDS ASSESSMENT     Patient's communication style  spoken language (English or Bilingual) 08/31/17 1053    Patient/family verbalizes understanding of discharge plan recommendations?  Yes 09/01/17 1143   FINAL RESOURCES    "   Medical Team notified of plan?  yes 09/01/17 1143   Resources List  Transitional Care 09/01/17 1143    Transportation Available  family or friend will provide 09/01/17 1143   Transitional Care  Completed 09/01/17 1143    Interventions provided  DC planning 09/01/17 1143   F/U Appointment Brochure Provided  07/27/17 (geriatric services to see) 07/27/17 1713    # of Referrals Placed by CTS  Post Acute Facilities 09/01/17 1143   Referrals Placed  TCU 09/01/17 1143    Back Recommendations  return to [facility] TC 09/01/17 1143   SKIN      Does Patient Need a Referral for Clinic CC  No 07/27/17 1713   Inspection of bony prominences  Full 08/31/17 1812    Discharge Planning Comments  return to [facility] 09/01/17 1143   Skin WDL  ex 09/01/17 0117    GASTROINTESTINAL (ADULT,PEDIATRIC,OB)     Skin Color/Characteristics  bruised (ecchymotic) 09/01/17 0117    GI WDL  ex 09/01/17 0117   SAFETY      All Quadrants Bowel Sounds  hypoactive 09/01/17 0117   Safety WDL  WDL 09/01/17 0117    Last Bowel Movement  07/28/17 07/28/17 0133                      Assessment WDL (Within Defined Limits) Definitions           Safety WDL     Effective: 09/28/15    Row Information: <b>WDL Definition:</b> Bed in low position, wheels locked; call light in reach; upper side rails up x 2; ID band on<br> <font color=\"gray\"><i>Item=AS safety wdl>>List=AS safety wdl>>Version=F14</i></font>      Skin WDL     Effective: 09/28/15    Row Information: <b>WDL Definition:</b> Warm; dry; intact; elastic; without discoloration; pressure points without redness<br> <font color=\"gray\"><i>Item=AS skin wdl>>List=AS skin wdl>>Version=F14</i></font>      Vitals     Vital Signs Flowsheet     VITAL SIGNS     Height  1.6 m (5' 3\") 08/31/17 1126    Temp  97.8  F (36.6  C) 09/01/17 1226   Weight  86 kg (189 lb 9.5 oz) (bed) 09/01/17 0629    Temp src  Oral 09/01/17 1226   BSA (Calculated - sq m)  1.95 08/31/17 1126    Resp  20 09/01/17 1226   " BMI (Calculated)  33.55 08/31/17 1126    Pulse  61 09/01/17 1226   POSITIONING      Heart Rate  61 09/01/17 1226   Head of Bed (HOB)  HOB at 30 degrees 09/01/17 0101    Pulse/Heart Rate Source  Monitor 09/01/17 1226   Body Position  supine, head elevated 09/01/17 0208    BP  138/63 09/01/17 1226   Positioning/Transfer Devices  pillows 08/31/17 1812    BP Location  Left arm 09/01/17 1226   ECG      Patient Position  Lying 08/31/17 1532   ECG Rhythm  Normal sinus rhythm 08/31/17 1532    OXYGEN THERAPY     ANALGESIA SIDE EFFECTS MONITORING      SpO2  93 % 09/01/17 1226   Side Effects Monitoring: Respiratory Quality  R 09/01/17 0602    O2 Device  None (Room air) 09/01/17 1226   Side Effects Monitoring: Respiratory Depth  N 09/01/17 0602    Oxygen Delivery  1 LPM 09/01/17 0602   Side Effects Monitoring: Sedation Level  2 09/01/17 0602    PAIN/COMFORT     DAILY CARE      Patient Currently in Pain  denies 09/01/17 0602   Activity Type  bedrest with commode 09/01/17 0637    0-10 Pain Scale  0 09/01/17 0037   Activity Level of Assistance  assistance, 2 people 09/01/17 0637    HEIGHT AND WEIGHT                   Patient Lines/Drains/Airways Status    Active LINES/DRAINS/AIRWAYS     Name: Placement date: Placement time: Site: Days: Last dressing change:    Peripheral IV 08/31/17 Right Hand 08/31/17   1126   Hand   1     Incision/Surgical Site 08/31/17 Right Neck 08/31/17   1450    less than 1             Patient Lines/Drains/Airways Status    Active PICC/CVC     None            Intake/Output Detail Report     Date Intake     Output   Net    Shift P.O. I.V. IV Piggyback Total Urine Blood Total       Noc 08/30/17 2300 - 08/31/17 0659 -- -- -- -- -- -- -- 0    Day 08/31/17 0700 - 08/31/17 1459 -- 1000 -- 1000 -- 20 20 980    [facility] 08/31/17 1500 - 08/31/17 2259 240 1331 -- 1571 800 -- 800 771    Noc 08/31/17 2300 - 09/01/17 0659 125 530 -- 655 0 -- -- 655    Day 09/01/17 0700 - 09/01/17 6877 -- -- 250 250 497 -- 700 -139      Last  Void/BM       Most Recent Value    Urine Occurrence 1 at 09/01/2017 1045    Stool Occurrence       Case Management/Discharge Planning     Case Management/Discharge Planning Flowsheet     REFERRAL INFORMATION     Interventions provided  DC planning 09/01/17 1143    Arrived From  nursing facility 07/27/17 1709   Key Recommendations  [facility] 09/01/17 1143    # of Referrals Placed by CTS  Post Acute Facilities 09/01/17 1143   Does Patient Need a Referral for Clinic CC  No 07/27/17 1713    Primary Care Clinic Name  [facility] 07/27/17 1711   FINAL NOTE      LIVING ENVIRONMENT     Final Note  return to [facility] 09/01/17 1143    Lives With  facility resident 08/31/17 1054   FINAL RESOURCES      Living Arrangements  extended care facility 07/27/17 1711   Resources List  Transitional Care 09/01/17 1143    Provides Primary Care For  no one, unable/limited ability to care for self 07/27/17 1711   Transitional Care  Completed 09/01/17 1143    COPING/STRESS     F/U Appointment Brochure Provided  07/27/17 (geriatric services to see) 07/27/17 1713    Major Change/Loss/Stressor  none 08/31/17 1053   Referrals Placed  TCU 09/01/17 1143    EXPECTED DISCHARGE     ABUSE RISK SCREEN      Expected Discharge Date  09/01/17 (TCU) 09/01/17 0813   QUESTION TO PATIENT:  Has a member of your family or a partner(now or in the past) intimidated, hurt, manipulated, or controlled you in any way?  no 08/31/17 1051    DISCHARGE PLANNING     QUESTION TO PATIENT: Do you feel safe going back to the place where you are living?  yes 08/31/17 1051    Patient/family verbalizes understanding of discharge plan recommendations?  Yes 09/01/17 1143   OBSERVATION: Is there reason to believe there has been maltreatment of a vulnerable adult (ie. Physical/Sexual/Emotional abuse, self neglect, lack of adequate food, shelter, medical care, or financial exploitation)?  no 08/31/17 1051    Medical Team notified of  plan?  yes 09/01/17 1143   (R) MENTAL HEALTH SUICIDE RISK      Transportation Available  family or friend will provide 09/01/17 1143   Are you depressed or being treated for depression?  No 08/31/17 1103    Discharge Planning Comments  return to [facility] 09/01/17 1147

## 2017-08-31 NOTE — IP AVS SNAPSHOT
[place] [address] [facility]    [address] [place] [address]    Phone:  [phone]                                       After Visit Summary   8/31/2017    Jazmin Clifton    MRN: [id number]           After Visit Summary Signature Page     I have received my discharge instructions, and my questions have been answered. I have discussed any challenges I see with this plan with the nurse or doctor.    ..........................................................................................................................................  Patient/Patient Representative Signature      ..........................................................................................................................................  Patient Representative Print Name and Relationship to Patient    ..................................................               ................................................  Date                                            Time    ..........................................................................................................................................  Reviewed by Signature/Title    ...................................................              ..............................................  Date                                                            Time

## 2017-08-31 NOTE — CONSULTS
[facility] Chart Check    84 y.o white female hypertensive, hyperlipidemia, non smoking, non diabetic IFG patient admitted for Rt CEA due to symptomatic (TIA) 78% MENDEZ stenosis. On Lipitor 40 mg daily, LDL-C is 28 mg/dl. Not known to be diabetic (mulitple glucose readings slightly above 125, but unknown if these were fasting or not) , but has A1C of 6.2 % Has stage 3 CKD , so metformin 850 BID to stave off development of diabetes would not be indicated.    No formal Vascular Medicine Consult therefore warranted. Please place Hospitalist consult if non vascular medical care warranted this hospitalization, or contact us if vascular medical care would be needed.    Sanchez Maldonado MD  [phone]

## 2017-08-31 NOTE — ANESTHESIA POSTPROCEDURE EVALUATION
Patient: Jazmin Clifton    Procedure(s):  RIGHT CAROTID ENDARTERECTOMY WITH EEG - Wound Class: I-Clean    Diagnosis:CAROTID STENOSIS  Diagnosis Additional Information: No value filed.    Anesthesia Type:  ETT, General    Note:  Anesthesia Post Evaluation    Patient location during evaluation: PACU  Patient participation: Able to fully participate in evaluation  Level of consciousness: awake  Pain management: adequate  Airway patency: patent  Cardiovascular status: acceptable  Respiratory status: acceptable  Hydration status: acceptable  PONV: none     Anesthetic complications: None          Last vitals:  Vitals:    08/31/17 1600 08/31/17 1610 08/31/17 1620   BP:      Pulse:      Resp: 12 16 17   Temp:      SpO2: 97% 98% 97%         Electronically Signed By: Char Adorno MD, MD  August 31, 2017  4:21 PM

## 2017-08-31 NOTE — ANESTHESIA PROCEDURE NOTES
ARTERIAL LINE PROCEDURE NOTE:   Pre-Procedure  Performed by VINNY VILLA  Location: pre-op      Pre-Anesthestic Checklist: patient identified, IV checked, site marked, risks and benefits discussed, informed consent, monitors and equipment checked, pre-op evaluation, at physician/surgeon's request and post-op pain management    Timeout  Correct Patient: Yes   Correct Procedure: Yes   Correct Site: Yes   Correct Laterality: Yes   Correct Position: Yes   Site Marked: Yes   .   Procedure Documentation  Procedure: arterial line, new line and elective  Diagnosis:PVD ASA 3  Supine  Insertion Site:radial, right.Skin infiltrated with 1 mL of 1% lidocaine. Injection technique: Seldinger Technique  .  .  Patient Prep;all elements of maximal sterile barrier technique followed, mask, hat, sterile gown, sterile gloves, draped, hand hygiene, chlorhexidine gluconate and isopropyl alcohol    Assessment/Narrative    Catheter: 20 gauge, 1.75 in/4.5 cm quick cath (integral wire)      Tegaderm dressing used.    Arterial waveform: Yes IBP within 10% of NIBP: Yes

## 2017-08-31 NOTE — IP AVS SNAPSHOT
` [place] [address] [facility]: [phone]            Medication Administration Report for Etienne Jazmin MARIBETH as of 09/01/17 1333   Legend:    Given Hold Not Given Due Canceled Entry Other Actions    Time Time (Time) Time  Time-Action       Inactive    Active    Linked        Medications 08/26/17 08/27/17 08/28/17 08/29/17 08/30/17 08/31/17 09/01/17    0.9% sodium chloride BOLUS  Dose: 250 mL Freq: ONCE Route: IV  Start: 09/01/17 0715          0615 (250 mL)-Rate/Dose Change                  acetaminophen (TYLENOL) tablet 975 mg  Dose: 975 mg Freq: EVERY 8 HOURS Route: PO  Start: 08/31/17 1700   End: 09/03/17 1659   Admin Instructions: Do not use if patient has an active opioid/acetaminophen analgesic order for pain  Maximum acetaminophen dose from all sources = 75 mg/kg/day not to exceed 4 grams/day.          1902 (975 mg)-Given        0037 (975 mg)-Given       0901 (975 mg)-Given       [ ] 1700           atorvastatin (LIPITOR) tablet 40 mg  Dose: 40 mg Freq: DAILY Route: PO  Start: 08/31/17 1700         1903 (40 mg)-Given        0902 (40 mg)-Given           benzocaine-menthol (CHLORASEPTIC) 6-10 MG lozenge 1-2 lozenge  Dose: 1-2 lozenge Freq: EVERY 1 HOUR PRN Route: BU  PRN Reason: sore throat  PRN Comment: without fever  Start: 08/31/17 1646              cephALEXin (KEFLEX) capsule 500 mg  Dose: 500 mg Freq: 2 TIMES DAILY Route: PO  Indications of Use: URINARY TRACT INFECTION  Start: 08/31/17 2100         2006 (500 mg)-Given        0902 (500 mg)-Given       [ ] 2100           citalopram (celeXA) tablet 20 mg  Dose: 20 mg Freq: DAILY Route: PO  Start: 09/01/17 0900          0902 (20 mg)-Given           clopidogrel (PLAVIX) tablet 75 mg  Dose: 75 mg Freq: DAILY Route: PO  Start: 08/31/17 1700         1857 (75 mg)-Given        0902 (75 mg)-Given           cyanocobalamin (vitamin  B-12) tablet 1,000 mcg  Dose: 1,000 mcg Freq: DAILY Route: PO  Start: 08/31/17 1700         (4248)-Not Given         0902 (1,000 mcg)-Given           docusate sodium (COLACE) capsule 100 mg  Dose: 100 mg Freq: DAILY Route: PO  Start: 08/31/17 1700         (1900)-Not Given        0902 (100 mg)-Given           felodipine ER (PLENDIL) 24 hr tablet 10 mg  Dose: 10 mg Freq: DAILY Route: PO  Start: 09/01/17 0900   Admin Instructions: Formulary alternate for<br>Dynacirc           (1237)-Not Given           fluticasone-vilanterol (BREO ELLIPTA) 100-25 MCG/INH oral inhaler 1 puff  Dose: 1 puff Freq: DAILY Route: IN  Start: 08/31/17 1700   Admin Instructions: *Do not use more frequently than once daily.*  Rinse mouth after use.          (1900)-Not Given        0909 (1 puff)-Given           furosemide (LASIX) tablet 20 mg  Dose: 20 mg Freq: 2 TIMES DAILY Route: PO  Start: 09/01/17 0900          0902 (20 mg)-Given       [ ] 2100           gabapentin (NEURONTIN) capsule 300 mg  Dose: 300 mg Freq: 2 TIMES DAILY Route: PO  Start: 08/31/17 2100         2006 (300 mg)-Given        0902 (300 mg)-Given       [ ] 2100           heparin sodium PF injection 5,000 Units  Dose: 5,000 Units Freq: EVERY 8 HOURS Route: SC  Start: 09/01/17 0900   Admin Instructions: Check to make sure start date/time is 12-24 hours post op unless documented complication.   Continue until discharge to home.   HOLD if platelet count falls below 50% of baseline or less than 100,000/ L and notify provider.           0903 (5,000 Units)-Given       [ ] 1700           isosorbide mononitrate (IMDUR) 24 hr tablet 30 mg  Dose: 30 mg Freq: DAILY Route: PO  Start: 09/01/17 0900   Admin Instructions: DO NOT CRUSH. Can split tablet in half along score robe.           0902 (30 mg)-Given           labetalol (NORMODYNE/TRANDATE) injection 10 mg  Dose: 10 mg Freq: EVERY 10 MIN PRN Route: IV  PRN Reason: high blood pressure  PRN Comment: Systolic Blood Pressure greater than 180 mmHg or Diastolic Blood Pressure greater than 90 mmHg.  Start: 08/31/17 1646   Admin Instructions: Repeat or double  "dose every 10 minutes up to 300 mg cumulative dose.               lidocaine (LMX4) cream  Freq: EVERY 1 HOUR PRN Route: Top  PRN Reason: pain  PRN Comment: with VAD insertion or accessing implanted port.  Start: 08/31/17 1646   Admin Instructions: Do NOT give if patient has a history of allergy to any local anesthetic or any \"roger\" product.   Apply 30 minutes prior to VAD insertion or port access.  MAX Dose:  2.5 g (  of 5 g tube)               lidocaine 1 % 1 mL  Dose: 1 mL Freq: EVERY 1 HOUR PRN Route: OTHER  PRN Comment: mild pain with VAD insertion or accessing implanted port  Start: 08/31/17 1646   Admin Instructions: Do NOT give if patient has a history of allergy to any local anesthetic or any \"roger\" product. MAX dose 1 mL subcutaneous OR intradermal in divided doses.               lisinopril (PRINIVIL/ZESTRIL) tablet 30 mg  Dose: 30 mg Freq: DAILY Route: PO  Start: 09/01/17 0900          0902 (30 mg)-Given           memantine (NAMENDA) tablet 5 mg  Dose: 5 mg Freq: DAILY Route: PO  Start: 09/01/17 0900          1248 (5 mg)-Given           metoprolol (LOPRESSOR) injection 5 mg  Dose: 5 mg Freq: EVERY 6 HOURS PRN Route: IV  PRN Reason: high blood pressure  Start: 08/31/17 1646   Admin Instructions: HOLD IF heart rate less than 65 beats per minute or Systolic Blood Pressure less than 110 mmHg, bronchospasm, on inotropic continuous infusions or being paced .  Start POD 1.    ADMINISTRATION: By slow IV push over at least 2 minutes or give by piggyback over 15-20 minutes if patient not appropriate for IV push administration. ASSESSMENT: Blood pressure and heart rate BEFORE and 10 minutes AFTER each dose x first 2 doses.               naloxone (NARCAN) injection 0.1-0.4 mg  Dose: 0.1-0.4 mg Freq: EVERY 2 MIN PRN Route: IV  PRN Reason: opioid reversal  Start: 08/31/17 1646   Admin Instructions: For respiratory rate LESS than or EQUAL to 8.  Partial reversal dose:  0.1 mg titrated q 2 minutes for Analgesia Side " Effects Monitoring Sedation Level of 3 (frequently drowsy, arousable, drifts to sleep during conversation).Full reversal dose:  0.4 mg bolus for Analgesia Side Effects Monitoring Sedation Level of 4 (somnolent, minimal or no response to stimulation).               omeprazole (priLOSEC) CR capsule 20 mg  Dose: 20 mg Freq: EVERY MORNING Route: PO  Start: 09/01/17 0900          0902 (20 mg)-Given           ondansetron (ZOFRAN-ODT) ODT tab 4 mg  Dose: 4 mg Freq: EVERY 6 HOURS PRN Route: PO  PRN Reasons: nausea,vomiting  Start: 08/31/17 1646   Admin Instructions: This is Step 1 of nausea and vomiting management.  If nausea not resolved in 15 minutes, go to Step 2 prochlorperazine (COMPAZINE). Do not push through foil backing. Peel back foil and gently remove. Place on tongue immediately. Administration with liquid unnecessary              Or  ondansetron (ZOFRAN) injection 4 mg  Dose: 4 mg Freq: EVERY 6 HOURS PRN Route: IV  PRN Reasons: nausea,vomiting  Start: 08/31/17 1646   Admin Instructions: This is Step 1 of nausea and vomiting management.  If nausea not resolved in 15 minutes, go to Step 2 prochlorperazine (COMPAZINE).  Irritant.               oxyCODONE (ROXICODONE) IR tablet 5 mg  Dose: 5 mg Freq: EVERY 3 HOURS PRN Route: PO  PRN Reason: moderate to severe pain  Start: 08/31/17 1646   Admin Instructions: IF CrCl is UNKNOWN start at lowest end of dosing range.  Hold while on PCA or with regular IV opioid dosing.               prochlorperazine (COMPAZINE) injection 5 mg  Dose: 5 mg Freq: EVERY 6 HOURS PRN Route: IV  PRN Reasons: nausea,vomiting  Start: 08/31/17 1646   Admin Instructions: This is Step 2 of nausea and vomiting management.   If nausea not resolved in 15 minutes, give metoclopramide (REGLAN) if ordered (step 3 of nausea and vomiting management)              Or  prochlorperazine (COMPAZINE) tablet 5 mg  Dose: 5 mg Freq: EVERY 6 HOURS PRN Route: PO  PRN Reasons: nausea,vomiting  Start: 08/31/17 1646    Admin Instructions: This is Step 2 of nausea and vomiting management.   If nausea not resolved in 15 minutes, give metoclopramide (REGLAN) if ordered (step 3 of nausea and vomiting management)                 Dose: 3 mL Freq: EVERY 8 HOURS Route: IK  Start: 08/31/17 1700   End: 09/01/17 1242   Admin Instructions: And Q1H PRN, to lock peripheral IV dormant line.          (2668)-Not Given        (0640)-Not Given       1242-Med Discontinued         Dose: 3 mL Freq: EVERY 1 HOUR PRN Route: IK  PRN Reason: line flush  PRN Comment: for peripheral IV flush post IV meds  Start: 08/31/17 1646   End: 09/01/17 1242          1242-Med Discontinued       tiotropium (SPIRIVA) capsule 18 mcg  Dose: 18 mcg Freq: DAILY Route: IN  Start: 09/01/17 0900   Admin Instructions: Use only ONE capsule. To ensure the full dose is administered, TWO inhalations should be performed at a rate sufficient to hear or feel the capsule vibrate.           0907 (18 mcg)-Given           trimethoprim (TRIMPEX) tablet 100 mg  Dose: 100 mg Freq: DAILY Route: PO  Indications of Use: URINARY TRACT INFECTION  Start: 08/31/17 1700         2043 (100 mg)-Given        0902 (100 mg)-Given          Future Medications  Medications 08/26/17 08/27/17 08/28/17 08/29/17 08/30/17 08/31/17 09/01/17       acetaminophen (TYLENOL) tablet 650 mg  Dose: 650 mg Freq: EVERY 4 HOURS PRN Route: PO  PRN Reason: other  PRN Comment: surgical pain  Start: 09/03/17 0000   Admin Instructions: May give first dose 4 hours after last scheduled dose of acetaminophen.  Maximum acetaminophen dose from all sources = 75 mg/kg/day not to exceed 4 grams/day.               melatonin tablet 1 mg  Dose: 1 mg Freq: AT BEDTIME PRN Route: PO  PRN Reason: sleep  Start: 09/01/17 2000   Admin Instructions: POD 1.  Do not give unless at least 6 hours of uninterrupted sleep is expected.              Completed Medications  Medications 08/26/17 08/27/17 08/28/17 08/29/17 08/30/17 08/31/17 09/01/17          Dose: 600 mg Freq: ONCE Route: RE  Start: 08/31/17 1545   End: 08/31/17 1550   Admin Instructions: In recovery room.          1550 (600 mg)-Given              Dose: 900 mg Freq: PRE-OP/PRE-PROCEDURE Route: IV  Indications of Use: SURGICAL PROPHYLAXIS  Start: 08/31/17 1105   End: 08/31/17 1255   Admin Instructions: Give first dose within 1 hour PRIOR to incision.          1141 (900 mg)-Handoff       1255 (900 mg)-Given           Discontinued Medications  Medications 08/26/17 08/27/17 08/28/17 08/29/17 08/30/17 08/31/17 09/01/17         Rate: 70 mL/hr Freq: CONTINUOUS Route: IV  Start: 08/31/17 1700   End: 09/01/17 0712   Admin Instructions: Saline lock when taking PO fluids.          1738 ( )-Rate/Dose Verify        0021 ( )-New Bag       0712-Med Discontinued         Freq: PRN  Start: 08/31/17 1426   End: 08/31/17 1646         1426 (250 mL)-Given       1646-Med Discontinued          Dose: 1,000 mg Freq: 3 TIMES DAILY Route: PO  Start: 08/31/17 1700   End: 08/31/17 1652   Admin Instructions: Maximum acetaminophen dose from all sources = 75 mg/kg/day not to exceed 4 gram          1652-Med Discontinued          Freq: ONCE Route: Top  Start: 08/31/17 1115   End: 08/31/17 1516   Admin Instructions: ~ Assess patient skin for allergy to antiseptic chlorhexidine.    ~ Instruct and assist patient to cleanse surgical area and surrounding skin using chlorhexidine 2% PRIOR to surgery.   ~ IF patient allergic to chlorhexidine, use antimicrobial soap to cleanse skin.  ~ Do NOT use chlorhexidine cloths for lumbar puncture or epidural procedures, surgery/invasive procedures at or above the neck, on mucous membranes or genitals, if patient age less than 2 months                 1516-Med Discontinued       Or    Freq: ONCE Route: Top  Start: 08/31/17 1115   End: 08/31/17 1516   Admin Instructions: IF patient allergic to chlorhexidine, use antimicrobial soap to cleanse skin.  Product will need to be charted without scanning.          "        1516-Med Discontinued          Dose: 25-50 mcg Freq: EVERY 2 MIN PRN Route: IV  PRN Reason: other  PRN Comment: acute pain  Start: 08/31/17 1538   End: 08/31/17 1646   Admin Instructions: MAX cumulative dose = 250 mcg.    Use Fentanyl initially, as a short acting agent for acute pain control.  If insufficient, or a longer acting agent is needed, begin Morphine or Hydromorphone if ordered.          1646-Med Discontinued          Rate: 100 mL/hr Freq: CONTINUOUS Route: IV  Start: 08/31/17 1545   End: 08/31/17 1646   Admin Instructions: Continue until IV catheter is weaned                 1646-Med Discontinued          Rate: 25 mL/hr Freq: CONTINUOUS Route: IV  Start: 08/31/17 1100   End: 08/31/17 1516   Admin Instructions: IF patient NOT on dialysis.          1141 ( )-New Bag       1232 ( )-Anesthesia Volume Adjustment       1444 ( )-New Bag       1516-Med Discontinued          Freq: PRN  Start: 08/31/17 1452   End: 08/31/17 1646         1452 (20 mL)-Given       1646-Med Discontinued          Dose: 1 mL Freq: EVERY 1 HOUR PRN Route: OTHER  PRN Comment: mild pain with VAD insertion or accessing implanted port  Start: 08/31/17 1052   End: 08/31/17 1516   Admin Instructions: Do NOT give if patient has a history of allergy to any local anesthetic or any \"roger\" product. MAX dose 1 mL subcutaneous OR intradermal in divided doses.          1516-Med Discontinued          Rate: 1.3-64.3 mL/hr Freq: CONTINUOUS PRN Route: IV  PRN Comment: START only if needed for Systolic Blood Pressure greater than 180 mmHg or Diastolic Blood Pressure greater than 90 mmHg.  Start: 08/31/17 1646   End: 09/01/17 0743   Admin Instructions: Start at lowest dose ordered. Titrate by 0.25 to 0.5 mcg/kg/min every 5 minutes to Systolic Blood Pressure 130 to 150 mmHg.  Max: 5 mcg/kg/min.   Notify provider if higher starting doses are initiated or if the titration reaches the upper range limit.  FOR ICU ONLY.  Conc: 0.4 mg/mL. Protect from light. " Irritant.           0743-Med Discontinued         Dose: 4 mg Freq: EVERY 30 MIN PRN Route: PO  PRN Reason: nausea  Start: 08/31/17 1538   End: 08/31/17 1646   Admin Instructions: MAX total dose = 8 mg, including OR dosing. If not resolved in 15 minutes, then go to step 2 (Prochlorperazine if ordered).          1646-Med Discontinued       Or    Dose: 4 mg Freq: EVERY 30 MIN PRN Route: IV  PRN Reason: nausea  Start: 08/31/17 1538   End: 08/31/17 1646   Admin Instructions: MAX total dose = 8 mg, including OR dosing. If not resolved in 15 minutes, then go to step 2 (Prochlorperazine if ordered).  Irritant.          1646-Med Discontinued          Dose: 5 mg Freq: EVERY 6 HOURS PRN Route: IV  PRN Reasons: nausea,vomiting  Start: 08/31/17 1538   End: 08/31/17 1646   Admin Instructions: This is Step 2 of the nausea and vomiting protocol.   If nausea not resolved in 15 minutes, give Metoclopramide if ordered (step 3 of nausea and vomiting protocol)            1646-Med Discontinued          Freq: PRN  Start: 08/31/17 1427   End: 08/31/17 1646         1427 (1,000 mL)-Given       1646-Med Discontinued          Freq: PRN  Start: 08/31/17 1426   End: 08/31/17 1646         1426 (1,000 mL)-Given       1646-Med Discontinued     Medications 08/26/17 08/27/17 08/28/17 08/29/17 08/30/17 08/31/17 09/01/17

## 2017-08-31 NOTE — IP AVS SNAPSHOT
"    [place] [address] [facility]: [phone]                                              INTERAGENCY TRANSFER FORM - LAB / IMAGING / EKG / EMG RESULTS   2017                    Hospital Admission Date: 2017  VICKIE REID   : [id number]  Sex: Female        Attending Provider: Hilario Parry MD     Allergies:  Accupril, Ace Inhibitors, Augmentin, Levofloxacin, Morphine, Norvasc [Amlodipine Besylate]    Infection:  None   Service:  SURGERY    Ht:  1.6 m (5' 3\")   Wt:  86 kg (189 lb 9.5 oz)   Admission Wt:  85.7 kg (189 lb)    BMI:  33.59 kg/m 2   BSA:  1.96 m 2            Patient PCP Information     Provider PCP Type    Mirian Aguilera MD General         Lab Results - 3 Days      Glucose by meter [[id number]]  Resulted: 17 0621, Result status: Final result    Ordering provider: Hilario Parry MD  17 0616 Resulting lab: POINT OF CARE TEST, GLUCOSE    Specimen Information    Type Source Collected On     17 0616          Components       Value Reference Range Flag Lab   Glucose 99 70 - 99 mg/dL  170            Platelet count [[id number]]  Resulted: 17 1844, Result status: Final result    Ordering provider: Hilario Parry MD  17 1113 Resulting lab: [facility]    Specimen Information    Type Source Collected On     17 1113          Components       Value Reference Range Flag Lab   Platelet Count 172 150 - 450 10e9/L  FrStHsLb            Hemoglobin A1c [[id number]] (Abnormal)  Resulted: 17 1216, Result status: Final result    Ordering provider: Hilario Parry MD  17 1105 Resulting lab: [facility]    Specimen Information    Type Source Collected On   Blood  17 1113          Components       Value Reference Range Flag Lab   Hemoglobin A1C 6.2 4.3 - 6.0 % H FrStHsLb            Lipid panel [[id number]]  Resulted: 17 1209, Result status: Final result    Ordering provider: " Hilario Parry MD  08/31/17 1105 Resulting lab: [facility]    Specimen Information    Type Source Collected On   Blood  08/31/17 1113          Components       Value Reference Range Flag Lab   Cholesterol 110 <200 mg/dL  FrStHsLb   Triglycerides 104 <150 mg/dL  FrStHsLb   HDL Cholesterol 61 >49 mg/dL  FrStHsLb   LDL Cholesterol Calculated 28 <100 mg/dL  FrStHsLb   Comment:  Desirable:       <100 mg/dl   Non HDL Cholesterol 49 <130 mg/dL  FrStHsLb            Basic metabolic panel [[id number]] (Abnormal)  Resulted: 08/31/17 1207, Result status: Final result    Ordering provider: Hilario Parry MD  08/31/17 1105 Resulting lab: [facility]    Specimen Information    Type Source Collected On   Blood  08/31/17 1113          Components       Value Reference Range Flag Lab   Sodium 141 133 - 144 mmol/L  FrStHsLb   Potassium 5.3 3.4 - 5.3 mmol/L  FrStHsLb   Chloride 107 94 - 109 mmol/L  FrStHsLb   Carbon Dioxide 28 20 - 32 mmol/L  FrStHsLb   Anion Gap 6 3 - 14 mmol/L  FrStHsLb   Glucose 102 70 - 99 mg/dL H FrStHsLb   Urea Nitrogen 36 7 - 30 mg/dL H FrStHsLb   Creatinine 1.42 0.52 - 1.04 mg/dL H FrStHsLb   GFR Estimate 35 >60 mL/min/1.7m2 L FrStHsLb   Comment:  Non  GFR Calc   GFR Estimate If Black 43 >60 mL/min/1.7m2 L FrStHsLb   Comment:  African American GFR Calc   Calcium 9.5 8.5 - 10.1 mg/dL  FrStHsLb            Testing Performed By     Lab - Abbreviation Name Director Address Valid Date Range    14 - FrStHsLb [facility] Unknown [address] [place] [address] 05/08/15 1057 - Present    170 - Unknown POINT OF CARE TEST, GLUCOSE Unknown Unknown 10/31/11 1114 - Present            Unresulted Labs (24h ago through future)    Start       Ordered    09/03/17 0600  Platelet count  (heparin- UU,UR,SH,RH,PH,WY)  EVERY THREE DAYS,   Routine     Comments:  Repeat every 3 days while on VTE prophylaxis. If no result is listed, this lab has not been done  the past 365 days. LATEST LAB RESULT: Platelet Count (10e9/L)       Date                     Value                 08/08/2017               142 (L)          ----------      08/31/17 1646      Encounter-Level Documents:     There are no encounter-level documents.      Order-Level Documents:     There are no order-level documents.

## 2017-08-31 NOTE — ANESTHESIA PREPROCEDURE EVALUATION
Anesthesia Evaluation     .             ROS/MED HX    ENT/Pulmonary:     (+)tobacco use, Past use mild COPD, , . .   (-) sleep apnea   Neurologic: Comment: Cerebral aneurysm     (+)CVA     Cardiovascular:     (+) hypertension-Peripheral Vascular Disease-- Carotid Stenosis, --. : . . . :. . Previous cardiac testing Echodate:7/26/17results:Interpretation Summary     There is no color Doppler evidence of an atrial shunt.  A contrast injection (Bubble Study) was performed that was negative for flow  across the interatrial septum (Valsalva maneuver not done).  The visual ejection fraction is estimated at 60-65%.  Normal left ventricular wall motion  The right ventricle is normal in size and function.  The rhythm was normal sinus.  The study was technically difficult. Limited views were obtained. Contrast was  used without apparent complications. No hemodynamically significant valvular  abnormalities on 2D or color flow imaging.  _____________________________________________________________________________  __        Left Ventricle  The left ventricle is normal in size. There is normal left ventricular wall  thickness. The visual ejection fraction is estimated at 60-65%. E by E prime  ratio is between 8 and 15, which is indeterminate for assessment of left  ventricular filling pressures. The transmitral spectral Doppler flow pattern  is normal for age. Normal left ventricular wall motion.     Right Ventricle  The right ventricle is normal in size and function.     Atria  The left atrium is mildly dilated. The left atrium is not well visualized.  Right atrial size is normal. There is no color Doppler evidence of an atrial  shunt. A contrast injection (Bubble Study) was performed that was negative for  flow across the interatrial septum.     Mitral Valve  The mitral valve is not well visualized. There is trace to mild mitral  regurgitation.        Tricuspid Valve  The tricuspid valve is not well visualized. There is  physiologic tricuspid  regurgitation. Right ventricular systolic pressure could not be approximated  due to inadequate tricuspid regurgitation. Small IVC (<1.5cm) with normal  respiratory collapse; right atrial pressure is estimated at 0-5mmHg.     Aortic Valve  The aortic valve is trileaflet with aortic valve sclerosis. There is trace  aortic regurgitation. No aortic stenosis is present.     Pulmonic Valve  The pulmonic valve is not well visualized. There is trace pulmonic valvular  regurgitation. Normal pulmonic valve velocity.     Vessels  Mild aortic root dilatation. The ascending aorta is Mildly dilated. The IVC is  normal in size and reactivity with respiration, suggesting normal central  venous pressure.     Pericardium  There is no pericardial effusion.        Rhythm  The rhythm was normal sinus.date: results: date: results: date: results:          METS/Exercise Tolerance:     Hematologic:         Musculoskeletal:   (+) arthritis, , , -       GI/Hepatic:     (+) GERD Asymptomatic on medication,       Renal/Genitourinary:     (+) chronic renal disease, type: CRI,       Endo:     (+) thyroid problem  Thyroid disease - Other, .      Psychiatric:     (+) psychiatric history depression      Infectious Disease:         Malignancy:   (+) Malignancy History of Skin          Other:                     Physical Exam  Normal systems: cardiovascular, pulmonary and dental    Airway   Mallampati: III  TM distance: >3 FB  Neck ROM: full    Dental     Cardiovascular       Pulmonary                     Anesthesia Plan      History & Physical Review  History and physical reviewed and following examination; no interval change.    ASA Status:  3 .    NPO Status:  > 8 hours    Plan for ETT and General with Intravenous and Propofol induction. Maintenance will be Inhalation.    PONV prophylaxis:  Ondansetron (or other 5HT-3) and Dexamethasone or Solumedrol  Additional equipment: Arterial Line Decadron and Zofran for PONV       Postoperative Care  Postoperative pain management:  IV analgesics and Oral pain medications.      Consents  Anesthetic plan, risks, benefits and alternatives discussed with:  Daughter/Son and Patient..                          .

## 2017-08-31 NOTE — BRIEF OP NOTE
[facility] Brief Operative Note    Pre-operative diagnosis: RIGHT CAROTID ARTERY STENOSIS   Post-operative diagnosis Same   Procedure: Procedure(s):  RIGHT CAROTID ENDARTERECTOMY WITH EEG - Wound Class: I-Clean   Surgeon(s): Surgeon(s) and Role:     * Hilario Parry MD - Primary     * Val Morgan MD - Assisting     * James Murdock MD - Vascular Surgery Fellow    Estimated blood loss: 20 mL    Specimens: * No specimens in log *   Findings: Redundant internal and external carotid artery. Focal atherosclerotic plaque of the proximal internal carotid bulb. Endarterectomy performed. Feathered proximal and distal endpoints. 0.8 x 8 Patch angioplasty. Patient neurologically intact on postoperative exam     James Murdock MD   Vascular Surgery Fellow  Pager: [phone]

## 2017-08-31 NOTE — IP AVS SNAPSHOT
"[place] [address] [facility]IES: [phone]                                              INTERAGENCY TRANSFER FORM - PHYSICIAN ORDERS   2017                    Hospital Admission Date: 2017  VICKIE REID   : [id number]  Sex: Female        Attending Provider: (none)    Allergies:  Accupril, Ace Inhibitors, Augmentin, Levofloxacin, Morphine, Norvasc [Amlodipine Besylate]    Infection:  None   Service:  SURGERY    Ht:  1.6 m (5' 3\")   Wt:  86 kg (189 lb 9.5 oz)   Admission Wt:  85.7 kg (189 lb)    BMI:  33.59 kg/m 2   BSA:  1.96 m 2            Patient PCP Information     Provider PCP Type    Mirian Aguilera MD General      ED Clinical Impression     Diagnosis Description Comment Added By Time Added    Symptomatic stenosis of right carotid artery without infarction [I65.21] Symptomatic stenosis of right carotid artery without infarction [I65.21]  James Murdock MD 2017  3:28 PM    Carotid stenosis, symptomatic w/o infarct, right [I65.21] Carotid stenosis, symptomatic w/o infarct, right [I65.21]  James Murdock MD 2017  7:22 AM      Hospital Problems as of 2017              Priority Class Noted POA    Carotid stenosis, symptomatic w/o infarct, right Medium  2017 Yes      Non-Hospital Problems as of 2017              Priority Class Noted    Atherosclerosis of renal artery (H) Medium  Unknown    Esophageal reflux Medium  Unknown    Cerebral aneurysm, nonruptured Medium  Unknown    Cardiac dysrhythmias NEC Medium  Unknown    Essential hypertension, benign Medium  2006    Congenital cystic kidney disease Medium  2006    Benign neoplasm of colon Medium  10/4/2006    Renovascular hypertension Medium  2006    CHF (congestive heart failure) (H) Medium  2008    Restrictive lung disease Medium  2008    CARDIOVASCULAR SCREENING; LDL GOAL LESS THAN 100 Medium  10/31/2010    Osteoporosis Medium  10/25/2011    S/P laminectomy Medium  10/25/2011    " Hip joint replacement status Medium  4/18/2012    Osteoarthritis Medium  4/18/2012    DDD (degenerative disc disease), lumbar Medium  4/18/2012    Mild major depression (H) Medium  4/18/2012    Incontinence of urine Medium  4/18/2012    Chronic obstructive pulmonary disease, unspecified COPD type (H) Medium  7/25/2017    Generalized muscle weakness Medium  7/25/2017    Dizziness Medium  7/25/2017    Osteoarthritis of right hip, unspecified osteoarthritis type Medium  7/25/2017    Hypotension, unspecified hypotension type Medium  7/25/2017    CVA (cerebral vascular accident) (H) Medium  7/26/2017    Transient cerebral ischemia, unspecified type Medium  8/1/2017    Carotid artery stenosis, symptomatic, right Medium  8/1/2017    ACP (advance care planning) Medium  8/3/2017    CKD (chronic kidney disease) stage 3, GFR 30-59 ml/min Medium  8/16/2017    Thyroid nodule Medium  8/23/2017    Trigger point of extremity Medium  8/23/2017    Trochanteric bursitis of right hip Medium  8/23/2017    Urinary tract infection without hematuria, site unspecified Medium  8/23/2017      Code Status History     Date Active Date Inactive Code Status Order ID Comments User Context    9/1/2017  7:22 AM  Full Code [id number]  James Murdock MD Outpatient    8/31/2017  4:46 PM 9/1/2017  7:22 AM Full Code [id number]  James Murdock MD Inpatient    7/28/2017  3:43 PM 8/31/2017  4:46 PM Full Code [id number]  Kenton Blackwood MD Outpatient    7/26/2017  5:55 PM 7/28/2017  3:43 PM Full Code [id number]  Shamar Orellana MD Inpatient         Medication Review      CONTINUE these medications which may have CHANGED, or have new prescriptions. If we are uncertain of the size of tablets/capsules you have at home, strength may be listed as something that might have changed.        Dose / Directions Comments    * TYLENOL PO   This may have changed:  Another medication with the same name was added. Make sure you understand how and when to take each.         Dose:  1000 mg   Take 1,000 mg by mouth 3 times daily (Takes 2 x 500 mg = 1000 mg)   Refills:  0        * acetaminophen 325 MG tablet   Commonly known as:  TYLENOL   This may have changed:  You were already taking a medication with the same name, and this prescription was added. Make sure you understand how and when to take each.   Used for:  Symptomatic stenosis of right carotid artery without infarction, Carotid stenosis, symptomatic w/o infarct, right   Notes to Patient:  Ok to restart medication, medication not administered in the hospital        Dose:  650 mg   Start taking on:  9/3/2017   Take 2 tablets (650 mg) by mouth every 4 hours as needed for other (surgical pain)   Quantity:  100 tablet   Refills:  0        * Notice:  This list has 2 medication(s) that are the same as other medications prescribed for you. Read the directions carefully, and ask your doctor or other care provider to review them with you.      CONTINUE these medications which have NOT CHANGED        Dose / Directions Comments    ACIDOPHILUS PO   Notes to Patient:  Ok to restart medication, not administered in the hospital.        Dose:  1 capsule   Take 1 capsule by mouth 2 times daily   Refills:  0        atorvastatin 40 MG tablet   Commonly known as:  LIPITOR   Used for:  Stenosis of right carotid artery        Dose:  40 mg   Take 1 tablet (40 mg) by mouth daily   Quantity:  30 tablet   Refills:  3        bisacodyl 10 MG Suppository   Commonly known as:  DULCOLAX        Dose:  10 mg   Place 10 mg rectally daily as needed for constipation   Refills:  0        BREO ELLIPTA 100-25 MCG/INH oral inhaler   Generic drug:  fluticasone-vilanterol        Dose:  1 puff   Inhale 1 puff into the lungs daily   Refills:  0        calcium-vitamin D 600-400 MG-UNIT per tablet   Commonly known as:  CALTRATE   Notes to Patient:  Ok to restart medication, medication not administered in the hospital        Dose:  1 tablet   Take 1 tablet by mouth  daily   Refills:  0        CELEXA PO        Dose:  20 mg   Take 20 mg by mouth daily   Refills:  0        CEPACOL DUAL RELIEF PO   Notes to Patient:  Ok to restart medication, medication not administered in the hospital        Dose:  1 Dose   Take 1 Dose by mouth every 2 hours as needed (sore throat)   Refills:  0        clopidogrel 75 MG tablet   Commonly known as:  PLAVIX   Used for:  Facial droop        Dose:  75 mg   Take 1 tablet (75 mg) by mouth daily   Quantity:  30 tablet   Refills:  0        COLACE PO        Dose:  100 mg   Take 100 mg by mouth daily   Refills:  0        cyanocobalamin 1000 MCG tablet   Commonly known as:  vitamin  B-12        Dose:  1000 mcg   Take 1,000 mcg by mouth daily   Refills:  0        diphenhydrAMINE 2 % cream   Commonly known as:  BENADRYL   Notes to Patient:  Ok to restart medication, medication not administered in the hospital        Apply topically 3 times daily as needed for itching   Refills:  0        ICY HOT LIDOCAINE PLUS MENTHOL EX   Notes to Patient:  Ok to restart medication, medication not administered in the hospital        Externally apply topically 3 times daily Apply to hips   Refills:  0        isosorbide mononitrate 30 MG 24 hr tablet   Commonly known as:  IMDUR        Dose:  30 mg   Take 30 mg by mouth daily   Refills:  0        isradipine 5 MG capsule   Commonly known as:  DYNACIRC   Notes to Patient:  Ok to restart medication, medication not administered in the hospital        Dose:  5 mg   Take 5 mg by mouth 2 times daily   Refills:  0        KEFLEX PO   Indication:  Urinary Tract Infection        Dose:  500 mg   Take 500 mg by mouth 2 times daily   Refills:  0        LASIX PO        Dose:  20 mg   Take 20 mg by mouth 2 times daily   Refills:  0        LISINOPRIL PO        Dose:  30 mg   Take 30 mg by mouth daily   Refills:  0        magnesium citrate solution   Notes to Patient:  Ok to restart medication, medication not administered in the hospital         Take by mouth daily as needed for other Give 240 cc by mouth as needed for BOWEL HEALTH Day 3: If no BM results from Fleets enema by 1800 - give 240 cc of Magnesium Citrate. If no results by day 4 - notify MD.   Refills:  0        magnesium hydroxide 400 MG/5ML suspension   Commonly known as:  MILK OF MAGNESIA   Notes to Patient:  Ok to restart medication, medication not administered in the hospital        Dose:  30 mL   Take 30 mLs by mouth daily as needed for constipation or heartburn   Refills:  0        MAGNESIUM OXIDE PO   Notes to Patient:  Ok to restart medication, medication not administered in the hospital        Dose:  250 mg   Take 250 mg by mouth daily   Refills:  0        NAMENDA PO        Dose:  5 mg   Take 5 mg by mouth daily   Refills:  0        NEURONTIN PO        Dose:  300 mg   Take 300 mg by mouth 2 times daily   Refills:  0        nystatin 161912 UNIT/GM Powd   Commonly known as:  MYCOSTATIN   Notes to Patient:  Ok to restart medication, medication not administered in the hospital        Apply topically 2 times daily as needed   Refills:  0        potassium chloride 10 MEQ tablet   Generic drug:  potassium chloride   Notes to Patient:  Ok to restart medication, medication not administered in the hospital        Dose:  10 mEq   Take 10 mEq by mouth daily   Refills:  0        PRILOSEC PO        Dose:  20 mg   Take 20 mg by mouth every morning   Refills:  0        SODIUM PHOSPHATES RE   Notes to Patient:  Ok to restart medication, medication not administered in the hospital        Dose:  1 Dose   Place 1 Dose rectally daily as needed Day 3: If no BM results from suppository by 1200 - administer Fleets enema RC per RSO.   Refills:  0        tiotropium 18 MCG capsule   Commonly known as:  SPIRIVA        Dose:  18 mcg   Inhale 18 mcg into the lungs daily   Refills:  0        triamcinolone 0.1 % cream   Commonly known as:  KENALOG   Notes to Patient:  Ok to restart medication, medication not  administered in the hospital        Apply topically 3 times daily   Refills:  0        TRIMETHOPRIM PO        Dose:  100 mg   Take 100 mg by mouth daily Give 1 tablet by mouth one time a day for preventative for UTI   Refills:  0                  Further instructions from your care team       Carotid Endartectomy  Post-Operative Instructions    Typical length of stay in the hospital is 1-2 days.  On occasion, an evening in the Intensive Care Unit may be required for blood pressure regulation.    Activity    Most people are able to resume normal activities 1-2 weeks after surgery.  The key is to gradually increase your level of activity as you are able.  It is not uncommon to feel easily fatigued - this will improve with time.      You should avoid heavy lifting more than 30 lbs for 1 week.      You may return to work when you feel able - typically 1-2 weeks after surgery, depending on the type of work you do.      You should not drive a car for 1 week after surgery.  In addition,  you can not be taking prescription strength pain medication and you must feel strong enough to drive safely.    Incision Care    You can shower or bathe 2 days after surgery - avoid rubbing and soaking your incision.      You may have strips of white tape called  steri-strips  across your incision; they should stay on for 5-7 days or until the ends start to curl up.  You can then remove the steri-strips, or we will remove them at your post-operative appointment.      Avoid shaving directly over the incision for 2-3 weeks.    Common Concerns    You may notice mild skin numbness on the side of your surgery in your neck, chin, face, and earlobe.  This is due to nerve  irritation  and will gradually resolve over the next several months.  Call our office if you experience any short-lasting episode of numbness or weakness affecting one side of your body.      Some bruising and firmness around you incision can be expected.  This will soften and  resolve over the next few weeks.  You may notice that some discoloration spreads to an area lower than the incision, such as the shoulder, neck or upper chest.  Likewise, swelling can occur near the incision or below the chin.  Call our office if the swelling or bruising worsens, or if you have difficulty swallowing.    Diet    You may resume a regular diet before you leave the hospital.  You may find that it is best to try smaller, more frequent meals until your appetite returns.    Medications    You may be started on a blood thinner after surgery - typically Aspirin and / or Plavix.      You may be given a prescription for pain medication after surgery.  If you need to have this refilled, please call your pharmacy where you had it filled.  They will then call us for approval.  Please do not call after hours for a refill on pain medication.    Post-Operative Appointments    You need to see your surgeon in the clinic approximately 1-2 weeks after surgery.  Post-operative appointment:   Date: _____________________________  Time: ____________________________  Dr. ______________________________      You will have an ultrasound test and evaluation with your surgeon 90 days (3 months) after surgery.      We will notify you by mail when you are due to schedule further ultrasound testing and evaluation; typically this is done annually.     When You Should Call Our Office    Body aches, chills or temperature greater than 101      Incision redness or drainage      Loss of vision in one eye      Loss of strength / weakness in one side of your body      Severe headache      Difficulty with speech      If you will be having an invasive procedure, such as a colonoscopy or dental work within one year of your surgery, you may need to take an antibiotic prior to the procedure if you had a Dacron patch with your surgery; please call us so we can assist you with this.    Call our main number, [phone], for assistance with any  concerns or questions.     Revised 5/2014    Summary of Visit     Reason for your hospital stay       Symptomatic Carotid artery Stenosis             After Care     Activity - Up with nursing assistance           Advance Diet as Tolerated       Follow this diet upon discharge: Orders Placed This Encounter      Advance Diet as Tolerated: Regular Diet Adult; Low Saturated Fat Diet       Fall precautions           General info for SNF       Length of Stay Estimate: Long Term Care  Condition at Discharge: Stable  Level of care:skilled   Rehabilitation Potential: Fair  Admission H&P remains valid and up-to-date: Yes  Recent Chemotherapy: N/A  Use Nursing Home Standing Orders: Yes       Mantoux instructions       Give two-step Mantoux (PPD) Per Facility Policy No (if no explain) Patient is resident.       Wound care (specify)       Site:   Right Neck  Instructions:  Leave incision open to air. Dermabond is intact over skin.  May shower. Sponge over incision with soap and water. Do not scrub. Pat dry. Do not submerge underwater x 4 weeks.             Referrals     Occupational Therapy Adult Consult       Evaluate and treat as clinically indicated.    Reason:  deconditioning       Physical Therapy Adult Consult       Evaluate and treat as clinically indicated.    Reason: deconditioning             Your next 10 appointments already scheduled     Sep 25, 2017  9:30 AM CDT   New Visit with Heather Whitaker MD   [facility]    [address] [id number] [phone]              Follow-Up Appointment Instructions     Future Labs/Procedures    Follow Up and recommended labs and tests     Comments:    Folllow-up with Dr. Parry at [facility] in 2 weeks for postoperative check      Follow-Up Appointment Instructions     Follow Up and recommended labs and tests       Folllow-up with Dr. Parry at [facility] in 2 weeks for postoperative  check             Statement of Approval     Ordered          09/01/17 0742  I have reviewed and agree with all the recommendations and orders detailed in this document.  EFFECTIVE NOW     Approved and electronically signed by:  James Murdock MD

## 2017-08-31 NOTE — DISCHARGE INSTRUCTIONS
Carotid Endartectomy  Post-Operative Instructions    Typical length of stay in the hospital is 1-2 days.  On occasion, an evening in the Intensive Care Unit may be required for blood pressure regulation.    Activity    Most people are able to resume normal activities 1-2 weeks after surgery.  The key is to gradually increase your level of activity as you are able.  It is not uncommon to feel easily fatigued - this will improve with time.      You should avoid heavy lifting more than 30 lbs for 1 week.      You may return to work when you feel able - typically 1-2 weeks after surgery, depending on the type of work you do.      You should not drive a car for 1 week after surgery.  In addition,  you can not be taking prescription strength pain medication and you must feel strong enough to drive safely.    Incision Care    You can shower or bathe 2 days after surgery - avoid rubbing and soaking your incision.      You may have strips of white tape called  steri-strips  across your incision; they should stay on for 5-7 days or until the ends start to curl up.  You can then remove the steri-strips, or we will remove them at your post-operative appointment.      Avoid shaving directly over the incision for 2-3 weeks.    Common Concerns    You may notice mild skin numbness on the side of your surgery in your neck, chin, face, and earlobe.  This is due to nerve  irritation  and will gradually resolve over the next several months.  Call our office if you experience any short-lasting episode of numbness or weakness affecting one side of your body.      Some bruising and firmness around you incision can be expected.  This will soften and resolve over the next few weeks.  You may notice that some discoloration spreads to an area lower than the incision, such as the shoulder, neck or upper chest.  Likewise, swelling can occur near the incision or below the chin.  Call our office if the swelling or bruising worsens, or if you have  difficulty swallowing.    Diet    You may resume a regular diet before you leave the hospital.  You may find that it is best to try smaller, more frequent meals until your appetite returns.    Medications    You may be started on a blood thinner after surgery - typically Aspirin and / or Plavix.      You may be given a prescription for pain medication after surgery.  If you need to have this refilled, please call your pharmacy where you had it filled.  They will then call us for approval.  Please do not call after hours for a refill on pain medication.    Post-Operative Appointments    You need to see your surgeon in the clinic approximately 1-2 weeks after surgery.  Post-operative appointment:   Date: _____________________________  Time: ____________________________  Dr. ______________________________      You will have an ultrasound test and evaluation with your surgeon 90 days (3 months) after surgery.      We will notify you by mail when you are due to schedule further ultrasound testing and evaluation; typically this is done annually.     When You Should Call Our Office    Body aches, chills or temperature greater than 101      Incision redness or drainage      Loss of vision in one eye      Loss of strength / weakness in one side of your body      Severe headache      Difficulty with speech      If you will be having an invasive procedure, such as a colonoscopy or dental work within one year of your surgery, you may need to take an antibiotic prior to the procedure if you had a Dacron patch with your surgery; please call us so we can assist you with this.    Call our main number, [phone], for assistance with any concerns or questions.     Revised 5/2014

## 2017-08-31 NOTE — PROGRESS NOTES
Admission medication history interview status for the 8/31/2017  admission is complete. See EPIC admission navigator for prior to admission medications     Medication history source reliability:Good    Medication history interview source(s):Patient and Caregiver (Nursing home)    Medication history resources (including written lists, pill bottles, clinic record): MAR & Nurse at Nursing Home    Primary pharmacy.[facility] (delivers to nursing home)    Additional medication history information not noted on PTA med list :None    Time spent in this activity: 60 min    Prior to Admission medications    Medication Sig Last Dose Taking? Auth Provider   potassium chloride (POTASSIUM CHLORIDE) 10 MEQ tablet Take 10 mEq by mouth daily 8/31/2017 at am Yes Reported, Patient   fluticasone-vilanterol (BREO ELLIPTA) 100-25 MCG/INH oral inhaler Inhale 1 puff into the lungs daily hasn't started yet Yes Reported, Patient   triamcinolone (KENALOG) 0.1 % cream Apply topically 3 times daily 8/31/2017 at am Yes Reported, Patient   calcium-vitamin D (CALTRATE) 600-400 MG-UNIT per tablet Take 1 tablet by mouth daily 8/30/2017 at am Yes Reported, Patient   Cephalexin (KEFLEX PO) Take 500 mg by mouth 2 times daily 8/31/2017 at am Yes Reported, Patient   diphenhydrAMINE (BENADRYL) 2 % cream Apply topically 3 times daily as needed for itching  8/28/2017 at 1200 Yes Reported, Patient   isradipine (DYNACIRC) 5 MG capsule Take 5 mg by mouth 2 times daily 8/31/2017 at am Yes Reported, Patient   magnesium citrate solution Take by mouth daily as needed for other Give 240 cc by mouth as needed for BOWEL HEALTH Day 3: If no BM results from Fleets enema by 1800 - give 240 cc of Magnesium Citrate. If no results by day 4 - notify MD. more than a week at prn Yes Reported, Patient   magnesium hydroxide (MILK OF MAGNESIA) 400 MG/5ML suspension Take 30 mLs by mouth daily as needed for constipation or heartburn more than a week at prn Yes Reported, Patient    bisacodyl (DULCOLAX) 10 MG Suppository Place 10 mg rectally daily as needed for constipation more than a week at prn Yes Reported, Patient   SODIUM PHOSPHATES RE Place 1 Dose rectally daily as needed Day 3: If no BM results from suppository by 1200 - administer Fleets enema RC per RSO. more than a month Yes Reported, Patient   clopidogrel (PLAVIX) 75 MG tablet Take 1 tablet (75 mg) by mouth daily 8/25/2017 at am Yes Kenton Blackwood MD   atorvastatin (LIPITOR) 40 MG tablet Take 1 tablet (40 mg) by mouth daily 8/30/2017 at am Yes Kenton Blackwood MD   isosorbide mononitrate (IMDUR) 30 MG 24 hr tablet Take 30 mg by mouth daily 8/31/2017 at am Yes Reported, Patient   Lactobacillus (ACIDOPHILUS PO) Take 1 capsule by mouth 2 times daily 8/31/2017 at pm Yes Reported, Patient   Lidocaine-Menthol (ICY HOT LIDOCAINE PLUS MENTHOL EX) Externally apply topically 3 times daily Apply to hips 8/16/2017 at prn Yes Reported, Patient   TRIMETHOPRIM PO Take 100 mg by mouth daily Give 1 tablet by mouth one time a day for preventative for UTI 8/29/2017 Yes Reported, Patient   Benzocaine-Glycerin-DM (CEPACOL DUAL RELIEF PO) Take 1 Dose by mouth every 2 hours as needed (sore throat)  more than a week at prn Yes Reported, Patient   Docusate Sodium (COLACE PO) Take 100 mg by mouth daily 8/30/2017 at am Yes Reported, Patient   Memantine HCl (NAMENDA PO) Take 5 mg by mouth daily 8/31/2017 at am Yes Reported, Patient   nystatin (MYCOSTATIN) 278497 UNIT/GM POWD Apply topically 2 times daily as needed  8/12/2017 at HS Yes Reported, Patient   MAGNESIUM OXIDE PO Take 250 mg by mouth daily 8/30/2017 at am Yes Reported, Patient   tiotropium (SPIRIVA) 18 MCG capsule Inhale 18 mcg into the lungs daily 8/31/2017 at am Yes Reported, Patient   Acetaminophen (TYLENOL PO) Take 1,000 mg by mouth 3 times daily (Takes 2 x 500 mg = 1000 mg) 8/31/2017 at am Yes Reported, Patient   Furosemide (LASIX PO) Take 20 mg by mouth 2 times daily  8/30/2017 at pm Yes  Reported, Patient   Citalopram Hydrobromide (CELEXA PO) Take 20 mg by mouth daily  8/31/2017 at am Yes Reported, Patient   LISINOPRIL PO Take 30 mg by mouth daily  8/31/2017 at am Yes Reported, Patient   Gabapentin (NEURONTIN PO) Take 300 mg by mouth 2 times daily  8/31/2017 at am Yes Reported, Patient   Omeprazole (PRILOSEC PO) Take 20 mg by mouth every morning 8/31/2017 at am Yes Reported, Patient   cyanocobalamin (VITAMIN  B-12) 1000 MCG tablet Take 1,000 mcg by mouth daily 8/30/2017 at am Yes Reported, Patient

## 2017-08-31 NOTE — IP AVS SNAPSHOT
` `     [place] [address] [facility]: [phone]                 INTERAGENCY TRANSFER FORM - NOTES (H&P, Discharge Summary, Consults, Procedures, Therapies)   2017                    Hospital Admission Date: 2017  VICKIE CLIFTON   : 1932  Sex: Female        Patient PCP Information     Provider PCP Type    Mirian Aguilera MD General         History & Physicals      Interval H&P Note by Carolee Guido at 2017 10:54 AM     Author:  Carolee Guido Service:  (none) Author Type:  CHRISTINA    Filed:  2017 10:54 AM Date of Service:  2017 10:54 AM Creation Time:  2017 10:54 AM    Status:  Signed :  Carolee Guido (CHRISTINA)         This note is for the purpose of making the H & P performed in clinic within the last 30 days available in the hospital surgical encounter.[SS1.1]       Revision History        User Key Date/Time User Provider Type Action    > SS1.1 2017 10:54 AM Carolee Guido [facility] Sign          Source Note     Author:  Kim Hidalgo APRN CNP Service:  (none) Author Type:  Nurse Practitioner    Filed:  2017  6:54 AM Date of Service:  8/15/2017 10:00 AM Creation Time:  2017 10:54 AM    Status:  Signed :  Kim Hidalgo APRN CNP (Nurse Practitioner)            [place] [facility][JG1.1]    Chief Complaint   Patient presents with     Nursing Home Acute[JG1.2]       HPI:    Vickie Clifton is a 84 year old  (1932), who is being seen today for an episodic care visit at [facility] .  HPI information obtained from: facility chart records, facility staff and patient report.Today's concern is:[JG1.1]  Cerebrovascular accident (CVA), unspecified mechanism (H)  Carotid artery stenosis, symptomatic, right[LG1.1]  Has been seen by vascular, planning surgical intervention but not on schedule yet[LG1.2]    Essential hypertension[LG1.1]  No headache, no dizziness. See BPs below.[LG1.2]     CKD (chronic kidney disease) stage  3, GFR 30-59 ml/min[LG1.1]  Creatinine   Date Value Ref Range Status   08/08/2017 1.36 (H) 0.52 - 1.04 mg/dL Final[LG1.3]   ][LG1.2]     Hip pain, right[LG1.1]  Ongoing R hip pain, concerning for bursitis[LG1.2]      BP Readings from Last 6 Encounters:   08/15/17 130/69   08/09/17 (!) 170/97   08/08/17 160/67   08/03/17 138/73   08/01/17 158/78   07/28/17 168/70[LG1.4]          ALLERGIES:[JG1.1] Accupril; Ace inhibitors; Augmentin; Levofloxacin; Morphine; and Norvasc [amlodipine besylate][JG1.2]  Past Medical, Surgical, Family and Social History reviewed and updated in EPIC.[JG1.1]    Current Outpatient Prescriptions   Medication Sig Dispense Refill     isradipine (DYNACIRC) 5 MG capsule Take 5 mg by mouth 2 times daily       magnesium citrate solution Take by mouth daily as needed for other Give 240 cc by mouth as needed for BOWEL HEALTH Day 3: If no BM results from Fleets enema by 1800 - give 240 cc of Magnesium Citrate. If no results by day 4 - notify MD.       magnesium hydroxide (MILK OF MAGNESIA) 400 MG/5ML suspension Take 30 mLs by mouth daily as needed for constipation or heartburn       bisacodyl (DULCOLAX) 10 MG Suppository Place 10 mg rectally daily as needed for constipation       SODIUM PHOSPHATES RE Place 1 Dose rectally daily as needed Day 3: If no BM results from suppository by 1200 - administer Fleets enema RC per RSO.       clopidogrel (PLAVIX) 75 MG tablet Take 1 tablet (75 mg) by mouth daily 30 tablet 0     atorvastatin (LIPITOR) 40 MG tablet Take 1 tablet (40 mg) by mouth daily 30 tablet 3     isosorbide mononitrate (IMDUR) 30 MG 24 hr tablet Take 30 mg by mouth daily       Lactobacillus (ACIDOPHILUS PO) Take 1 capsule by mouth 2 times daily       Lidocaine-Menthol (ICY HOT LIDOCAINE PLUS MENTHOL EX) Externally apply topically 3 times daily Apply to hips       TRIMETHOPRIM PO Take 100 mg by mouth daily Give 1 tablet by mouth one time a day for preventative for UTI       Benzocaine-Glycerin-DM  (CEPACOL DUAL RELIEF PO) Give 1 dose by mouth every 2 hours as needed for Sore throat Per RSO - package instructions 1 lozenge Q 2 hours.       Docusate Sodium (COLACE PO) Take 100 mg by mouth daily       Memantine HCl (NAMENDA PO) Take 5 mg by mouth daily       nystatin (MYCOSTATIN) 124295 UNIT/GM POWD Apply topically 2 times daily       OXYCODONE HCL PO Take 2.5 mg by mouth every 4 hours as needed Give 2.5 mg po Q 4 hrs prn pain       potassium chloride (KLOR-CON) 20 MEQ Packet Take 10 mEq by mouth daily Give 10 mEq by mouth one time a day for supplement       MAGNESIUM OXIDE PO Take 250 mg by mouth daily       calcium carb 1250 mg, 500 mg [place],/vitamin D 200 units (OSCAL WITH D) 500-200 MG-UNIT per tablet Take 1 tablet by mouth daily       tiotropium (SPIRIVA) 18 MCG capsule Inhale 18 mcg into the lungs daily       budesonide-formoterol (SYMBICORT) 80-4.5 MCG/ACT Inhaler Inhale 2 puffs into the lungs 2 times daily       Acetaminophen (TYLENOL PO) Take 1,000 mg by mouth 3 times daily        Furosemide (LASIX PO) Take 20 mg by mouth 2 times daily        Citalopram Hydrobromide (CELEXA PO) Take 20 mg by mouth daily        LISINOPRIL PO Take 20 mg by mouth daily        Gabapentin (NEURONTIN PO) Take 300 mg by mouth 2 times daily        Omeprazole (PRILOSEC PO) Take 20 mg by mouth every morning       cyanocobalamin (VITAMIN  B-12) 1000 MCG tablet Take 1,000 mcg by mouth daily[JG1.2]       Medications reviewed:  Medications reconciled to facility chart and changes were made to reflect current medications as identified as above med list. Below are the changes that were made:   Medications stopped since last EPIC medication reconciliation:   There are no discontinued medications.    Medications started since last [facility] medication reconciliation:  Orders Placed This Encounter   Medications     isradipine (DYNACIRC) 5 MG capsule     Sig: Take 5 mg by mouth 2 times daily         REVIEW OF SYSTEMS:[JG1.1]  4 point ROS  including Respiratory, CV, GI and , other than that noted in the HPI,  is negative[LG1.2]    Physical Exam:[JG1.1]  /69  Pulse 70  Temp 98  F (36.7  C)  Resp 18  Wt 190 lb (86.2 kg)  SpO2 97%  BMI 33.66 kg/m2[JG1.2]  GENERAL APPEARANCE:  Alert, in[JG1.1] no acute[LG1.2] distress  HEAD:  Normal, normocephalic, atraumatic  EYE EXAM: normal external eye, conjunctiva, lids, PAPI  NECK EXAM: supple, no JVD  CHEST/RESP:  respiratory effort normal ,[JG1.1] no[LG1.2] respiratory distress  M/S:   extremities normal, gait normal-[JG1.1]with rolling walker[LG1.2] , normal muscle tone, and range of motion[JG1.1] normal[LG1.2]   NEUROLOGIC EXAM: Normal gross motor movement, tone and coordination. No tremor.  Cranial nerves 2-12 are normal tested and grossly at patient's baseline  PSYCH:  Alert and oriented to[JG1.1] self and surroundings with forgetfulness[LG1.2] , affect[JG1.1] pleasant[LG1.2] , judgement[JG1.1] appropriate[LG1.2]     Recent Labs:[JG1.1]    Results for orders placed or performed in visit on 08/14/17   TSH   Result Value Ref Range    TSH 0.62 0.40 - 4.00 mU/L[JG1.2]         Assessment/Plan:[JG1.1]  Cerebrovascular accident (CVA), unspecified mechanism (H)[LG1.5]  No further symptoms, working with therapy and improving. Planning assisted living facility placement in the near future[LG1.2]    Carotid artery stenosis, symptomatic, right[LG1.5]  Working with vascular, awaiting surgical follow up.[LG1.2]     Essential hypertension[LG1.5]  BP somewhat improved, still trending higher than goal systolic<130. Will increase lisinopril a bit. She greatly dislikes increase in diuretic as she has to urinate so frequently[LG1.2]    CKD (chronic kidney disease) stage 3, GFR 30-59 ml/min[LG1.5]  Avoid nephrotoxic medications, Renal dosing of medications, monitor kidney function routinely.[LG1.2]      Hip pain, right[LG1.5]  Ongoing R hip pain, concerning for bursitis. She is willing to consider injection to R hip  for pain control. Will have Dr. Shafer evaluate for this later this week.[LG1.2]     Thyroid nodule[LG1.5]  TSH within normal limits, L thyroid nodule noted 2.3x2.4x3.2 cm on US Carotid dated 7/26/17.[LG1.2]       Orders:[JG1.1]  1. DC lisinopril[JG1.3] 20[LG1.2]  2. Lisinopril 30 mg po QD Dx HTN[JG1.3]      Electronically signed by  Kim Hidalgo CNP   [facility]  [phone] cell[JG1.1]           Revision History        User Key Date/Time User Provider Type Action    > LG1.5 8/29/2017 10:54 AM Kim Hidalgo APRN CNP Nurse Practitioner Sign     LG1.3 8/16/2017  6:48 AM Kim Hidalgo APRN CNP Nurse Practitioner      LG1.1 8/16/2017  6:47 AM Kim Hidalgo APRN CNP Nurse Practitioner      LG1.2 8/16/2017  6:41 AM Kim Hidalgo APRN CNP Nurse Practitioner      JG1.3 8/15/2017  3:21 PM Analisa Stahl Coordinator      LG1.4 8/15/2017  3:17 PM Kim Hidalgo APRN CNP Nurse Practitioner      JG1.2 8/15/2017  2:12 PM Analisa Stahl Coordinator      JG1.1 8/15/2017  2:11 PM Analisa Stahl Coordinator                   H&P (View-Only) by Kim Hidalgo APRN CNP at 8/15/2017 10:00 AM     Author:  Kim Hidalgo APRN CNP Service:  (none) Author Type:  Nurse Practitioner    Filed:  8/16/2017  6:54 AM Date of Service:  8/15/2017 10:00 AM Creation Time:  8/29/2017 10:54 AM    Status:  Signed :  Kim Hidalgo APRN CNP (Nurse Practitioner)         [place] [facility][JG1.1]    Chief Complaint   Patient presents with     Nursing Home Acute[JG1.2]       HPI:    Jazmin Clifton is a 84 year old  (12/30/1932), who is being seen today for an episodic care visit at [facility] .  HPI information obtained from: facility chart records, facility staff and patient report.Today's concern is:[JG1.1]  Cerebrovascular accident (CVA), unspecified mechanism (H)  Carotid artery stenosis, symptomatic, right[LG1.1]  Has been seen by vascular, planning surgical intervention but not on schedule  yet[LG1.2]    Essential hypertension[LG1.1]  No headache, no dizziness. See BPs below.[LG1.2]     CKD (chronic kidney disease) stage 3, GFR 30-59 ml/min[LG1.1]  Creatinine   Date Value Ref Range Status   08/08/2017 1.36 (H) 0.52 - 1.04 mg/dL Final[LG1.3]   ][LG1.2]     Hip pain, right[LG1.1]  Ongoing R hip pain, concerning for bursitis[LG1.2]      BP Readings from Last 6 Encounters:   08/15/17 130/69   08/09/17 (!) 170/97   08/08/17 160/67   08/03/17 138/73   08/01/17 158/78   07/28/17 168/70[LG1.4]          ALLERGIES:[JG1.1] Accupril; Ace inhibitors; Augmentin; Levofloxacin; Morphine; and Norvasc [amlodipine besylate][JG1.2]  Past Medical, Surgical, Family and Social History reviewed and updated in EPIC.[JG1.1]    Current Outpatient Prescriptions   Medication Sig Dispense Refill     isradipine (DYNACIRC) 5 MG capsule Take 5 mg by mouth 2 times daily       magnesium citrate solution Take by mouth daily as needed for other Give 240 cc by mouth as needed for BOWEL HEALTH Day 3: If no BM results from Fleets enema by 1800 - give 240 cc of Magnesium Citrate. If no results by day 4 - notify MD.       magnesium hydroxide (MILK OF MAGNESIA) 400 MG/5ML suspension Take 30 mLs by mouth daily as needed for constipation or heartburn       bisacodyl (DULCOLAX) 10 MG Suppository Place 10 mg rectally daily as needed for constipation       SODIUM PHOSPHATES RE Place 1 Dose rectally daily as needed Day 3: If no BM results from suppository by 1200 - administer Fleets enema RC per RSO.       clopidogrel (PLAVIX) 75 MG tablet Take 1 tablet (75 mg) by mouth daily 30 tablet 0     atorvastatin (LIPITOR) 40 MG tablet Take 1 tablet (40 mg) by mouth daily 30 tablet 3     isosorbide mononitrate (IMDUR) 30 MG 24 hr tablet Take 30 mg by mouth daily       Lactobacillus (ACIDOPHILUS PO) Take 1 capsule by mouth 2 times daily       Lidocaine-Menthol (ICY HOT LIDOCAINE PLUS MENTHOL EX) Externally apply topically 3 times daily Apply to hips        TRIMETHOPRIM PO Take 100 mg by mouth daily Give 1 tablet by mouth one time a day for preventative for UTI       Benzocaine-Glycerin-DM (CEPACOL DUAL RELIEF PO) Give 1 dose by mouth every 2 hours as needed for Sore throat Per RSO - package instructions 1 lozenge Q 2 hours.       Docusate Sodium (COLACE PO) Take 100 mg by mouth daily       Memantine HCl (NAMENDA PO) Take 5 mg by mouth daily       nystatin (MYCOSTATIN) 982182 UNIT/GM POWD Apply topically 2 times daily       OXYCODONE HCL PO Take 2.5 mg by mouth every 4 hours as needed Give 2.5 mg po Q 4 hrs prn pain       potassium chloride (KLOR-CON) 20 MEQ Packet Take 10 mEq by mouth daily Give 10 mEq by mouth one time a day for supplement       MAGNESIUM OXIDE PO Take 250 mg by mouth daily       calcium carb 1250 mg, 500 mg [place],/vitamin D 200 units (OSCAL WITH D) 500-200 MG-UNIT per tablet Take 1 tablet by mouth daily       tiotropium (SPIRIVA) 18 MCG capsule Inhale 18 mcg into the lungs daily       budesonide-formoterol (SYMBICORT) 80-4.5 MCG/ACT Inhaler Inhale 2 puffs into the lungs 2 times daily       Acetaminophen (TYLENOL PO) Take 1,000 mg by mouth 3 times daily        Furosemide (LASIX PO) Take 20 mg by mouth 2 times daily        Citalopram Hydrobromide (CELEXA PO) Take 20 mg by mouth daily        LISINOPRIL PO Take 20 mg by mouth daily        Gabapentin (NEURONTIN PO) Take 300 mg by mouth 2 times daily        Omeprazole (PRILOSEC PO) Take 20 mg by mouth every morning       cyanocobalamin (VITAMIN  B-12) 1000 MCG tablet Take 1,000 mcg by mouth daily[JG1.2]       Medications reviewed:  Medications reconciled to facility chart and changes were made to reflect current medications as identified as above med list. Below are the changes that were made:   Medications stopped since last EPIC medication reconciliation:   There are no discontinued medications.    Medications started since last [facility] medication reconciliation:  Orders Placed This Encounter   Medications      isradipine (DYNACIRC) 5 MG capsule     Sig: Take 5 mg by mouth 2 times daily         REVIEW OF SYSTEMS:[JG1.1]  4 point ROS including Respiratory, CV, GI and , other than that noted in the HPI,  is negative[LG1.2]    Physical Exam:[JG1.1]  /69  Pulse 70  Temp 98  F (36.7  C)  Resp 18  Wt 190 lb (86.2 kg)  SpO2 97%  BMI 33.66 kg/m2[JG1.2]  GENERAL APPEARANCE:  Alert, in[JG1.1] no acute[LG1.2] distress  HEAD:  Normal, normocephalic, atraumatic  EYE EXAM: normal external eye, conjunctiva, lids, PAPI  NECK EXAM: supple, no JVD  CHEST/RESP:  respiratory effort normal ,[JG1.1] no[LG1.2] respiratory distress  M/S:   extremities normal, gait normal-[JG1.1]with rolling walker[LG1.2] , normal muscle tone, and range of motion[JG1.1] normal[LG1.2]   NEUROLOGIC EXAM: Normal gross motor movement, tone and coordination. No tremor.  Cranial nerves 2-12 are normal tested and grossly at patient's baseline  PSYCH:  Alert and oriented to[JG1.1] self and surroundings with forgetfulness[LG1.2] , affect[JG1.1] pleasant[LG1.2] , judgement[JG1.1] appropriate[LG1.2]     Recent Labs:[JG1.1]    Results for orders placed or performed in visit on 08/14/17   TSH   Result Value Ref Range    TSH 0.62 0.40 - 4.00 mU/L[JG1.2]         Assessment/Plan:[JG1.1]  Cerebrovascular accident (CVA), unspecified mechanism (H)[LG1.5]  No further symptoms, working with therapy and improving. Planning assisted living facility placement in the near future[LG1.2]    Carotid artery stenosis, symptomatic, right[LG1.5]  Working with vascular, awaiting surgical follow up.[LG1.2]     Essential hypertension[LG1.5]  BP somewhat improved, still trending higher than goal systolic<130. Will increase lisinopril a bit. She greatly dislikes increase in diuretic as she has to urinate so frequently[LG1.2]    CKD (chronic kidney disease) stage 3, GFR 30-59 ml/min[LG1.5]  Avoid nephrotoxic medications, Renal dosing of medications, monitor kidney function  routinely.[LG1.2]      Hip pain, right[LG1.5]  Ongoing R hip pain, concerning for bursitis. She is willing to consider injection to R hip for pain control. Will have Dr. Shafer evaluate for this later this week.[LG1.2]     Thyroid nodule[LG1.5]  TSH within normal limits, L thyroid nodule noted 2.3x2.4x3.2 cm on US Carotid dated 7/26/17.[LG1.2]       Orders:[JG1.1]  1. DC lisinopril[JG1.3] 20[LG1.2]  2. Lisinopril 30 mg po QD Dx HTN[JG1.3]      Electronically signed by  Kim Hidalgo CNP   [facility]  [phone] cell[JG1.1]          Revision History        User Key Date/Time User Provider Type Action    > LG1.5 8/29/2017 10:54 AM Kim Hidalgo APRN CNP Nurse Practitioner Sign     LG1.3 8/16/2017  6:48 AM Kim Hidalgo APRN CNP Nurse Practitioner      LG1.1 8/16/2017  6:47 AM Kim Hidalgo APRN CNP Nurse Practitioner      LG1.2 8/16/2017  6:41 AM Kim Hidalgo APRN CNP Nurse Practitioner      JG1.3 8/15/2017  3:21 PM Analisa Stahl Coordinator      LG1.4 8/15/2017  3:17 PM Kim Hidalgo APRN CNP Nurse Practitioner      JG1.2 8/15/2017  2:12 PM Analisa Stahl Coordinator      JG1.1 8/15/2017  2:11 PM Analisa Stahl Coordinator                   Discharge Summaries     No notes of this type exist for this encounter.         Consult Notes      Consults by Sanchez Maldonado MD at 8/31/2017  2:15 PM     Author:  Sanchez Maldonado MD Service:  Vascular Medicine Author Type:  Physician    Filed:  8/31/2017  2:16 PM Date of Service:  8/31/2017  2:15 PM Creation Time:  8/31/2017  2:11 PM    Status:  Signed :  Sanchez Maldonado MD (Physician)         [facility] Chart Check    84 y.o white female hypertensive, hyperlipidemia, non smoking, non diabetic IFG patient admitted for Rt CEA due to symptomatic (TIA) 78% MENDEZ stenosis. On Lipitor 40 mg daily, LDL-C is 28 mg/dl. Not known to be diabetic (mulitple glucose readings slightly above 125, but unknown  if these were fasting or not) , but has A1C of 6.2 % Has stage 3 CKD , so metformin 850 BID to stave off development of diabetes would not be indicated.    No formal Vascular Medicine Consult therefore warranted. Please place Hospitalist consult if non vascular medical care warranted this hospitalization, or contact us if vascular medical care would be needed.[CF1.1]    Sanchez Maldonado[CF1.2], MD  [phone][CF1.1]     Revision History        User Key Date/Time User Provider Type Action    > CF1.2 8/31/2017  2:16 PM Sanchez Maldonado MD Physician Sign     CF1.1 8/31/2017  2:11 PM Sanchez Maldonado MD Physician                      Progress Notes - Physician (Notes from 08/29/17 through 09/01/17)      Progress Notes by Ladonna Thayer LSW at 9/1/2017 10:03 AM     Author:  Ladonna Thayer LSW Service:  Social Work Author Type:      Filed:  9/1/2017 10:56 AM Date of Service:  9/1/2017 10:03 AM Creation Time:  9/1/2017 10:03 AM    Status:  Addendum :  Ladonna Thayer LSW ()         SWS PROGRESS NOTE:     D/I: Pt has orders to DC today. Per chart review, pt is from [facility] in [place]. BONITA left a message for pt's son, Ed, asking to discuss DC plans. BONITA also left a message for Lorrie in admissions at [facility], asking to discuss pt's return.   P: BONITA will wait for a return call from pt's family and [facility] to continue discussing DC of pt to [facility] today.[AM1.1]     Ladonna Thayer[AM1.2], MSW, LGSW *[phone][AM1.1]    UPDATE 1045:   BONITA spoke with pt's son, Ed, who plans to transport his mother on DC. Pt will return to [facility] and Ed agrees with the DC plans. BONITA sent DC orders to [facility], although no return call has been made to discuss the specifics of DC. Ed plans to pick pt up at 1330 today. SW updated bed side nurse, charge nurse and HUC. No PAS needed since pt will return to the TCU; scripts will be faxed as  "ordered.[AM1.3]      Revision History        User Key Date/Time User Provider Type Action    > AM1.3 9/1/2017 10:56 AM Ladonna Thayer LSW  Addend     [N/A] 9/1/2017 10:09 AM Ladonna Thayer LSW  Addend     AM1.2 9/1/2017 10:09 AM Ladonna Thyaer LSW  Sign     AM1.1 9/1/2017 10:03 AM Ladonna Thayer LSW              Progress Notes by James Murdock MD at 9/1/2017  7:07 AM     Author:  James Murdock MD Service:  Vascular Surgery Author Type:  Resident    Filed:  9/1/2017  7:11 AM Date of Service:  9/1/2017  7:07 AM Creation Time:  9/1/2017  7:07 AM    Status:  Signed :  James Murdock MD (Resident)         Vascular Surgery Progress Note     Patient seen and evaluated at the bedside. She has no complaints. Per RN she has not voided since yesterday and was straight cathd in the PACU. She complains of now new deficits.[CO1.1]     /72  Pulse 62  Temp 97.7  F (36.5  C) (Oral)  Resp 20  Ht 5' 3\"  Wt 189 lb 9.5 oz  SpO2 97%  BMI 33.59 kg/m2    Intake/Output Summary (Last 24 hours) at 09/01/17 0708  Last data filed at 09/01/17 0552   Gross per 24 hour   Intake             3226 ml   Output              820 ml   Net             2406 ml[CO1.2]     General: Elderly obese  female resting supine NAD   HEENT: Right neck incision is intact. Wound edges well approximated. No hematoma.   Cor: RRR  Pulm: Breathing unlabored.   Neuro: CN II-XII intact. Symmetrical shoulder shrug. No tongue deviation on protrusion.      Assessment: Mrs an 83 yo female with a history of hypertension, CKD Stage III, repair of cerebral artery aneurysm POD 1 s/p right carotid endarterectomy for critical carotid stenosis observed after she sustained a TIA 07/28.     Plan:     1. Will give bolus of Normal Saline 250ml. Monitor for response.   2. OOB to chair/up Ad cande.   3. Diet as tolerated.   4. Plan for discharge to SNF today.     James Murdock MD   Vascular Surgery " "Fellow  Pager: [phone][CO1.1]     Revision History        User Key Date/Time User Provider Type Action    > CO1.2 9/1/2017  7:11 AM James Murdock MD Resident Sign     CO1.1 9/1/2017  7:07 AM James Murdock MD Resident             Progress Notes by James Murdock MD at 8/31/2017  7:49 PM     Author:  James Murdock MD Service:  Vascular Surgery Author Type:  Resident    Filed:  8/31/2017  7:59 PM Date of Service:  8/31/2017  7:49 PM Creation Time:  8/31/2017  7:49 PM    Status:  Signed :  James Murdock MD (Resident)         Vascular Surgery Post-Operative Note      Patient seen and evaluated at the bedside.  Appears somnolent however arouses to voice.[CO1.1]     /78  Pulse 62  Temp 97.4  F (36.3  C) (Oral)  Resp 16  Ht 5' 3\"  Wt 189 lb  SpO2 96%  BMI 33.48 kg/m2    Intake/Output Summary (Last 24 hours) at 08/31/17 1951  Last data filed at 08/31/17 1620   Gross per 24 hour   Intake             1900 ml   Output              820 ml   Net             1080 ml[CO1.2]     General: Elderly obese  female resting supine NAD   HEENT: Right neck incision is intact. Wound edges well approximated. No hematoma.   Cor: RRR  Pulm: Breathing unlabored.   Neuro: CN II-XII intact. Symmetrical shoulder shrug. No tongue deviation on protrusion.     Assessment: Mrs an 83 yo female with a history of hypertension, CKD Stage III, repair of cerebral artery aneurysm s/p right carotid endarterectomy for critical carotid stenosis observed after she sustained a TIA 07/28.    Plan:     1. Aggressive Incentive spirometry.   2. Continue neurochecks  3. Continue Aspirin and Plavix.   4. Diet as tolerated today.   5. Discontinue Maldonado at 6am  6. Plan for discharge back to nursing home in AM if no overnight events.      James Murdock MD   Vascular Surgery Fellow  Pager: [phone][CO1.1]      Revision History        User Key Date/Time User Provider Type Action    > CO1.2 8/31/2017  7:59 PM James Murdock" MD Resident Sign     CO1.1 8/31/2017  7:49 PM James Murdock MD Resident             Progress Notes by Karly Novak RN at 8/31/2017  4:28 PM     Author:  Karly Novak RN Service:  Surgery Author Type:  Registered Nurse    Filed:  8/31/2017  4:34 PM Date of Service:  8/31/2017  4:28 PM Creation Time:  8/31/2017  4:33 PM    Status:  Signed :  Karly Novak RN (Registered Nurse)         Called PACU for report, RN not available.[GT1.1]       Revision History        User Key Date/Time User Provider Type Action    > GT1.1 8/31/2017  4:34 PM Karly Novak RN Registered Nurse Sign            Progress Notes by Valerio Keita at 8/31/2017  3:41 PM     Author:  Valerio Keita Service:  (none) Author Type:  Technician    Filed:  8/31/2017  3:42 PM Date of Service:  8/31/2017  3:41 PM Creation Time:  8/31/2017  3:41 PM    Status:  Signed :  Valerio Keita (Technician)         EEG; 17- C079  Reading MD: Eliseo  Ordering MD: Brinda  Right Carotid endarectomy complete[KO1.1]        Revision History        User Key Date/Time User Provider Type Action    > KO1.1 8/31/2017  3:42 PM Valerio Keita Technician Sign            Progress Notes by Carmen Beckford at 8/31/2017 12:21 PM     Author:  Carmen Beckford Service:  (none) Author Type:  Technician    Filed:  8/31/2017 12:22 PM Date of Service:  8/31/2017 12:21 PM Creation Time:  8/31/2017 12:21 PM    Status:  Signed :  Carmen Beckford (Technician)         Admission medication history interview status for the 8/31/2017  admission is complete. See EPIC admission navigator for prior to admission medications     Medication history source reliability:Good    Medication history interview source(s):Patient and Caregiver (Nursing home)    Medication history resources (including written lists, pill bottles, clinic record): MAR & Nurse at Nursing Home    Primary pharmacy.[facility] (delivers to nursing home)    Additional medication  history information not noted on PTA med list :None    Time spent in this activity: 60 min    Prior to Admission medications    Medication Sig Last Dose Taking? Auth Provider   potassium chloride (POTASSIUM CHLORIDE) 10 MEQ tablet Take 10 mEq by mouth daily 8/31/2017 at am Yes Reported, Patient   fluticasone-vilanterol (BREO ELLIPTA) 100-25 MCG/INH oral inhaler Inhale 1 puff into the lungs daily hasn't started yet Yes Reported, Patient   triamcinolone (KENALOG) 0.1 % cream Apply topically 3 times daily 8/31/2017 at am Yes Reported, Patient   calcium-vitamin D (CALTRATE) 600-400 MG-UNIT per tablet Take 1 tablet by mouth daily 8/30/2017 at am Yes Reported, Patient   Cephalexin (KEFLEX PO) Take 500 mg by mouth 2 times daily 8/31/2017 at am Yes Reported, Patient   diphenhydrAMINE (BENADRYL) 2 % cream Apply topically 3 times daily as needed for itching  8/28/2017 at 1200 Yes Reported, Patient   isradipine (DYNACIRC) 5 MG capsule Take 5 mg by mouth 2 times daily 8/31/2017 at am Yes Reported, Patient   magnesium citrate solution Take by mouth daily as needed for other Give 240 cc by mouth as needed for BOWEL HEALTH Day 3: If no BM results from Fleets enema by 1800 - give 240 cc of Magnesium Citrate. If no results by day 4 - notify MD. more than a week at prn Yes Reported, Patient   magnesium hydroxide (MILK OF MAGNESIA) 400 MG/5ML suspension Take 30 mLs by mouth daily as needed for constipation or heartburn more than a week at prn Yes Reported, Patient   bisacodyl (DULCOLAX) 10 MG Suppository Place 10 mg rectally daily as needed for constipation more than a week at prn Yes Reported, Patient   SODIUM PHOSPHATES RE Place 1 Dose rectally daily as needed Day 3: If no BM results from suppository by 1200 - administer Fleets enema RC per RSO. more than a month Yes Reported, Patient   clopidogrel (PLAVIX) 75 MG tablet Take 1 tablet (75 mg) by mouth daily 8/25/2017 at am Yes Kenton Blackwood MD   atorvastatin (LIPITOR) 40 MG tablet  Take 1 tablet (40 mg) by mouth daily 8/30/2017 at am Yes Kenton Blackwood MD   isosorbide mononitrate (IMDUR) 30 MG 24 hr tablet Take 30 mg by mouth daily 8/31/2017 at am Yes Reported, Patient   Lactobacillus (ACIDOPHILUS PO) Take 1 capsule by mouth 2 times daily 8/31/2017 at pm Yes Reported, Patient   Lidocaine-Menthol (ICY HOT LIDOCAINE PLUS MENTHOL EX) Externally apply topically 3 times daily Apply to hips 8/16/2017 at prn Yes Reported, Patient   TRIMETHOPRIM PO Take 100 mg by mouth daily Give 1 tablet by mouth one time a day for preventative for UTI 8/29/2017 Yes Reported, Patient   Benzocaine-Glycerin-DM (CEPACOL DUAL RELIEF PO) Take 1 Dose by mouth every 2 hours as needed (sore throat)  more than a week at prn Yes Reported, Patient   Docusate Sodium (COLACE PO) Take 100 mg by mouth daily 8/30/2017 at am Yes Reported, Patient   Memantine HCl (NAMENDA PO) Take 5 mg by mouth daily 8/31/2017 at am Yes Reported, Patient   nystatin (MYCOSTATIN) 416547 UNIT/GM POWD Apply topically 2 times daily as needed  8/12/2017 at HS Yes Reported, Patient   MAGNESIUM OXIDE PO Take 250 mg by mouth daily 8/30/2017 at am Yes Reported, Patient   tiotropium (SPIRIVA) 18 MCG capsule Inhale 18 mcg into the lungs daily 8/31/2017 at am Yes Reported, Patient   Acetaminophen (TYLENOL PO) Take 1,000 mg by mouth 3 times daily (Takes 2 x 500 mg = 1000 mg) 8/31/2017 at am Yes Reported, Patient   Furosemide (LASIX PO) Take 20 mg by mouth 2 times daily  8/30/2017 at pm Yes Reported, Patient   Citalopram Hydrobromide (CELEXA PO) Take 20 mg by mouth daily  8/31/2017 at am Yes Reported, Patient   LISINOPRIL PO Take 30 mg by mouth daily  8/31/2017 at am Yes Reported, Patient   Gabapentin (NEURONTIN PO) Take 300 mg by mouth 2 times daily  8/31/2017 at am Yes Reported, Patient   Omeprazole (PRILOSEC PO) Take 20 mg by mouth every morning 8/31/2017 at am Yes Reported, Patient   cyanocobalamin (VITAMIN  B-12) 1000 MCG tablet Take 1,000 mcg by mouth daily  8/30/2017 at am Yes Reported, Patient[EO1.1]            Revision History        User Key Date/Time User Provider Type Action    > EO1.1 8/31/2017 12:22 PM Carmen Beckford Technician Sign                  Procedure Notes     No notes of this type exist for this encounter.      Progress Notes - Therapies (Notes from 08/29/17 through 09/01/17)     No notes of this type exist for this encounter.

## 2017-08-31 NOTE — ANESTHESIA CARE TRANSFER NOTE
Patient: Jazmin Clifton    Procedure(s):  RIGHT CAROTID ENDARTERECTOMY WITH EEG - Wound Class: I-Clean    Diagnosis: CAROTID STENOSIS  Diagnosis Additional Information: No value filed.    Anesthesia Type:   ETT, General     Note:  Airway :Face Mask  Patient transferred to:PACU  Comments: Patient spont ventilating. Adequate TV's. Opening eyes to voice and following commands to all extremities. ETT suctioned and removed without issues. To PACU with O2. Vitals stable. Report given to RN.       Vitals: (Last set prior to Anesthesia Care Transfer)    CRNA VITALS  8/31/2017 1444 - 8/31/2017 1521      8/31/2017             Pulse: 77    ART BP: 154/58    ART Mean: 100    SpO2: 97 %    Resp Rate (set): 10                Electronically Signed By: MARY Ospina CRNA  August 31, 2017  3:21 PM

## 2017-09-01 ENCOUNTER — TELEPHONE (OUTPATIENT)
Dept: GERIATRICS | Facility: CLINIC | Age: 82
End: 2017-09-01

## 2017-09-01 VITALS
RESPIRATION RATE: 20 BRPM | HEIGHT: 63 IN | WEIGHT: 189.6 LBS | SYSTOLIC BLOOD PRESSURE: 138 MMHG | OXYGEN SATURATION: 93 % | HEART RATE: 61 BPM | TEMPERATURE: 97.8 F | DIASTOLIC BLOOD PRESSURE: 63 MMHG | BODY MASS INDEX: 33.59 KG/M2

## 2017-09-01 LAB — GLUCOSE BLDC GLUCOMTR-MCNC: 99 MG/DL (ref 70–99)

## 2017-09-01 PROCEDURE — 00000146 ZZHCL STATISTIC GLUCOSE BY METER IP

## 2017-09-01 PROCEDURE — 25000128 H RX IP 250 OP 636: Performed by: SURGERY

## 2017-09-01 PROCEDURE — 40000884 ZZH STATISTIC STEP DOWN HRS NIGHT

## 2017-09-01 PROCEDURE — A9270 NON-COVERED ITEM OR SERVICE: HCPCS | Mod: GY | Performed by: SURGERY

## 2017-09-01 PROCEDURE — 25000132 ZZH RX MED GY IP 250 OP 250 PS 637: Mod: GY | Performed by: SURGERY

## 2017-09-01 RX ORDER — ACETAMINOPHEN 325 MG/1
650 TABLET ORAL EVERY 4 HOURS PRN
Qty: 100 TABLET | Refills: 0 | Status: SHIPPED | OUTPATIENT
Start: 2017-09-03 | End: 2017-09-05

## 2017-09-01 RX ADMIN — SODIUM CHLORIDE: 9 INJECTION, SOLUTION INTRAVENOUS at 00:21

## 2017-09-01 RX ADMIN — HEPARIN SODIUM 5000 UNITS: 5000 INJECTION, SOLUTION INTRAVENOUS; SUBCUTANEOUS at 09:03

## 2017-09-01 RX ADMIN — ATORVASTATIN CALCIUM 40 MG: 40 TABLET, FILM COATED ORAL at 09:02

## 2017-09-01 RX ADMIN — CLOPIDOGREL BISULFATE 75 MG: 75 TABLET ORAL at 09:02

## 2017-09-01 RX ADMIN — CEPHALEXIN 500 MG: 500 CAPSULE ORAL at 09:02

## 2017-09-01 RX ADMIN — ACETAMINOPHEN 975 MG: 325 TABLET, FILM COATED ORAL at 09:01

## 2017-09-01 RX ADMIN — TRIMETHOPRIM 100 MG: 100 TABLET ORAL at 09:02

## 2017-09-01 RX ADMIN — DOCUSATE SODIUM 100 MG: 100 CAPSULE, LIQUID FILLED ORAL at 09:02

## 2017-09-01 RX ADMIN — FUROSEMIDE 20 MG: 20 TABLET ORAL at 09:02

## 2017-09-01 RX ADMIN — CITALOPRAM HYDROBROMIDE 20 MG: 20 TABLET ORAL at 09:02

## 2017-09-01 RX ADMIN — ISOSORBIDE MONONITRATE 30 MG: 30 TABLET, EXTENDED RELEASE ORAL at 09:02

## 2017-09-01 RX ADMIN — TIOTROPIUM BROMIDE 18 MCG: 18 CAPSULE ORAL; RESPIRATORY (INHALATION) at 09:07

## 2017-09-01 RX ADMIN — FLUTICASONE FUROATE AND VILANTEROL TRIFENATATE 1 PUFF: 100; 25 POWDER RESPIRATORY (INHALATION) at 09:09

## 2017-09-01 RX ADMIN — ACETAMINOPHEN 975 MG: 325 TABLET, FILM COATED ORAL at 00:37

## 2017-09-01 RX ADMIN — MEMANTINE 5 MG: 5 TABLET ORAL at 12:48

## 2017-09-01 RX ADMIN — LISINOPRIL 30 MG: 20 TABLET ORAL at 09:02

## 2017-09-01 RX ADMIN — GABAPENTIN 300 MG: 300 CAPSULE ORAL at 09:02

## 2017-09-01 RX ADMIN — CYANOCOBALAMIN TAB 1000 MCG 1000 MCG: 1000 TAB at 09:02

## 2017-09-01 RX ADMIN — OMEPRAZOLE 20 MG: 20 CAPSULE, DELAYED RELEASE ORAL at 09:02

## 2017-09-01 NOTE — TELEPHONE ENCOUNTER
Returned from [facility], [facility] s/p carotid endarterectomy and is having 6/10 pain. No pain medications were prescribed.     Called in oxycodone 2.5-5 mg q3h PRN per pain scale. #20.    Ana Cristina Yu MD

## 2017-09-01 NOTE — PROGRESS NOTES
SWS PROGRESS NOTE:     D/I: Pt has orders to DC today. Per chart review, pt is from [facility] in [place]. BONITA left a message for pt's son, Ed, asking to discuss DC plans. BONITA also left a message for Lorrie in admissions at [facility], asking to discuss pt's return.   P: SW will wait for a return call from pt's family and [facility] to continue discussing DC of pt to [facility] today.     Ladonna Thayer, MSW, LGSW [phone]    UPDATE 1048:   BONITA spoke with pt's son, Ed, who plans to transport his mother on DC. Pt will return to [facility] and Ed agrees with the DC plans. SW sent DC orders to [facility], although no return call has been made to discuss the specifics of DC. Ed plans to pick pt up at 1330 today. BONITA updated bed side nurse, charge nurse and HUC. No PAS needed since pt will return to the TCU; scripts will be faxed as ordered.     UPDATE 1130:   BONITA reviewed paper chart and obtained direct phone and fax for TCU at [facility] (F: [phone]). No answer at the number listed but DC orders were sent to the fax. BONITA also called the admissions line again and left another message updating them on DC time; orders and updated notes have been sent to the admissions fax as well. CC and bed side nurse have been updated. BONITA also spoke with pt's other son, Ashlyn, and updated him on pt's DC; he agrees with the plans.

## 2017-09-01 NOTE — PLAN OF CARE
Problem: Goal Outcome Summary  Goal: Goal Outcome Summary  Outcome: No Change  VSS, bowels hypo, -flatus, neuros intact, slight tongue deviation to the right, incision intact with dermabond and ecchymotic, pt straight cathed at 1630 for 800ml, DTV

## 2017-09-01 NOTE — OP NOTE
DATE OF PROCEDURE:  8/31/2017      PREOPERATIVE DIAGNOSIS:  High-grade stenosis right internal carotid artery.      POSTOPERATIVE DIAGNOSIS:  High-grade stenosis, right internal carotid artery.      PROCEDURE:  Right carotid thromboendarterectomy with Dacron patch angioplasty.      SURGEON:  Hilario Parry MD      :  James Murdock MD      DESCRIPTION OF PROCEDURE:  Under satisfactory general anesthesia, Jazmin Clifton's right neck was prepped and draped in routine sterile fashion.  A longitudinal incision was made along the anterior border of the right sternocleidomastoid muscle, and the platysma was divided using electrocautery.  Dissection was sharply carried down to the carotid sheath at the level of the omohyoid muscle.  The carotid sheath was sharply opened, and common carotid artery was isolated using sharp and blunt dissection and encircled with a vessel loop.  Dissection was then carried up along the anterior surface of the carotid artery to its bifurcation.  Venous tributaries to internal jugular system were clamped, divided and ligated with interrupted ties of 2-0 and 3-0 silk.  The superior thyroid and external carotid arteries were isolated using sharp and blunt dissection, and encircled with vessel loops.  The internal carotid artery was then freed up almost to the base of the skull using sharp and blunt dissection.  The hypoglossal nerve was identified and preserved.  The patient was systemically heparinized with 4000 units of IV heparin.  The internal carotid, external carotid and common carotid arteries were occluded.  The common carotid artery was opened in a longitudinal arteriotomy.  The arteriotomy was carried up onto the internal carotid artery for approximately 3 cm.  A Sundt shunt was placed in the usual fashion.  The patient had a short high-grade calcified stenosis present in the proximal internal carotid artery.  The atheromatous plaque was endarterectomized to the level  of the common carotid artery and transected.  The endarterectomy was then carried up to the carotid bifurcation.  The external carotid artery was endarterectomized using the eversion technique over the course of approximately 5 mm.  The plaque was sharply transected.  The endarterectomy was then carried up onto the internal carotid artery for a short distance where the plaque began to thin out posteriorly.  At this level, the intima was sharply divided and the specimen was removed en bloc.  There was a shallow intimal flap distally which was tacked down with interrupted sutures of 6-0 Prolene.  The endarterectomized segment was thoroughly washed with dilute heparinized saline solution, and all loose debris was removed.  The arteriotomy was then closed with a Dacron patch angioplasty.  The Dacron patch was cut to size and affixed in place circumferentially, the internal carotid artery with a running suture of 7-0 Prolene, and the common carotid artery with a running suture of 6-0 Prolene.  Prior to completion of the patch angioplasty, the Sundt shunt was removed.  The external common and internal carotid arteries were all flushed.  The endarterectomized segment was again washed with dilute heparinized saline solution, and then closure of the patch angioplasty was completed.  Flow was then restored up through the external carotid artery for several beats and up through the internal carotid artery.  After assuring good hemostasis, the wound was then closed in layers with interrupted sutures of 3-0 Vicryl.  Skin was closed with 4-0 subcuticular Vicryl stitch.  Sponge and needle counts were correct at the termination of the procedure.  The patient tolerated the procedure well and was taken to the post-anesthetic recovery room in satisfactory condition.  She did undergo intraoperative EEG monitoring throughout the case, and no significant changes were noted.         PRASHANT BOND III, MD             D: 08/31/2017  14:58   T: 2017 08:26   MT: [id number]      Name:     VICKIE REID   MRN:      [id number]        Account:        [id number]   :      1932           Procedure Date: 2017      Document: [id number]       cc: Mirian Aguilera MD

## 2017-09-01 NOTE — PROGRESS NOTES
Pt DC to TCU per MD orders.  Pt's son Ed to transport the pt to the facility.  Staff helped pt to the son's car via wheel chair.  TCU packet handed to the son to bring to the TCU facility.  VSS.  Neuros intact.  (R) neck incision is bruised but intact.

## 2017-09-01 NOTE — PROGRESS NOTES
"Vascular Surgery Post-Operative Note      Patient seen and evaluated at the bedside.  Appears somnolent however arouses to voice.     /78  Pulse 62  Temp 97.4  F (36.3  C) (Oral)  Resp 16  Ht 5' 3\"  Wt 189 lb  SpO2 96%  BMI 33.48 kg/m2    Intake/Output Summary (Last 24 hours) at 08/31/17 1951  Last data filed at 08/31/17 1620   Gross per 24 hour   Intake             1900 ml   Output              820 ml   Net             1080 ml     General: Elderly obese  female resting supine NAD   HEENT: Right neck incision is intact. Wound edges well approximated. No hematoma.   Cor: RRR  Pulm: Breathing unlabored.   Neuro: CN II-XII intact. Symmetrical shoulder shrug. No tongue deviation on protrusion.     Assessment: Mrs an 85 yo female with a history of hypertension, CKD Stage III, repair of cerebral artery aneurysm s/p right carotid endarterectomy for critical carotid stenosis observed after she sustained a TIA 07/28.    Plan:     1. Aggressive Incentive spirometry.   2. Continue neurochecks  3. Continue Aspirin and Plavix.   4. Diet as tolerated today.   5. Discontinue Maldonado at 6am  6. Plan for discharge back to nursing home in AM if no overnight events.      James Murdock MD   Vascular Surgery Fellow  Pager: [phone]   "

## 2017-09-01 NOTE — PLAN OF CARE
Problem: Goal Outcome Summary  Goal: Goal Outcome Summary  Outcome: Improving  Neuros and CMS and VSS. O2 on at 1 LPM overnight for slight desats to 88-89% when sleep. Denies pain. Incision and right neck distal to incision bruised with dermabond closure. Pt unable to void since PACU when she was straight-cathed. Up to BSC w/2 to attempt x2. Pt not uncomfortable.Bladder scanned at 0400 for 257 cc's. Dr Murdock here to see pt at 0610..made aware of voiding difficulty. Ordered a 250cc bolus to hopefully allow her to void.

## 2017-09-01 NOTE — PROGRESS NOTES
"Vascular Surgery Progress Note     Patient seen and evaluated at the bedside. She has no complaints. Per RN she has not voided since yesterday and was straight cathd in the PACU. She complains of now new deficits.     /72  Pulse 62  Temp 97.7  F (36.5  C) (Oral)  Resp 20  Ht 5' 3\"  Wt 189 lb 9.5 oz  SpO2 97%  BMI 33.59 kg/m2    Intake/Output Summary (Last 24 hours) at 09/01/17 0708  Last data filed at 09/01/17 0552   Gross per 24 hour   Intake             3226 ml   Output              820 ml   Net             2406 ml     General: Elderly obese  female resting supine NAD   HEENT: Right neck incision is intact. Wound edges well approximated. No hematoma.   Cor: RRR  Pulm: Breathing unlabored.   Neuro: CN II-XII intact. Symmetrical shoulder shrug. No tongue deviation on protrusion.      Assessment: Mrs an 83 yo female with a history of hypertension, CKD Stage III, repair of cerebral artery aneurysm POD 1 s/p right carotid endarterectomy for critical carotid stenosis observed after she sustained a TIA 07/28.     Plan:     1. Will give bolus of Normal Saline 250ml. Monitor for response.   2. OOB to chair/up Ad cande.   3. Diet as tolerated.   4. Plan for discharge to SNF today.     James Murdock MD   Vascular Surgery Fellow  Pager: [phone]  "

## 2017-09-02 LAB — INTERPRETATION ECG - MUSE: NORMAL

## 2017-09-02 NOTE — PROCEDURES
ELECTROENCEPHALOGRAPHIC MONITORING      EEG # 17-C079      DATE OF SERVICE:  2017      PATIENT:  Jazmin Clifton      INDICATIONS:  Dr. Parry performed surgery on  for right carotid stenosis.        FINDINGS:  The baseline wake recording and anesthetic patterns were symmetric.  There was no change with occlusion or following restoration of flow.       IMPRESSION:  The study indicates that collateral flow was adequate during the acute period of occlusion.  The shunt was used during surgery, and no changes in EEG were seen.         MIRIAN STEEN MD, PHD, [facility]             D: 2017 13:04   T: 2017 03:54   MT: [id number]      Name:     JAZMIN CLIFTON   MRN:      [id number]        Account:        [id number]   :      1932           Procedure Date: 2017      Document: [id number]

## 2017-09-05 ENCOUNTER — NURSING HOME VISIT (OUTPATIENT)
Dept: GERIATRICS | Facility: CLINIC | Age: 82
End: 2017-09-05
Payer: COMMERCIAL

## 2017-09-05 VITALS
DIASTOLIC BLOOD PRESSURE: 70 MMHG | TEMPERATURE: 98.3 F | RESPIRATION RATE: 18 BRPM | SYSTOLIC BLOOD PRESSURE: 136 MMHG | HEART RATE: 68 BPM | OXYGEN SATURATION: 93 %

## 2017-09-05 DIAGNOSIS — I63.9 CEREBROVASCULAR ACCIDENT (CVA), UNSPECIFIED MECHANISM (H): Primary | ICD-10-CM

## 2017-09-05 DIAGNOSIS — Z98.890 S/P CAROTID ENDARTERECTOMY: ICD-10-CM

## 2017-09-05 DIAGNOSIS — I65.21 CAROTID ARTERY STENOSIS, SYMPTOMATIC, RIGHT: ICD-10-CM

## 2017-09-05 PROCEDURE — 99310 SBSQ NF CARE HIGH MDM 45: CPT | Performed by: NURSE PRACTITIONER

## 2017-09-05 NOTE — PROGRESS NOTES
[place] [facility]  PRIMARY CARE PROVIDER AND CLINIC:  Mirian Aguilera [facility] [id number] [address] / CATIE WEAVER *  Chief Complaint   Patient presents with     Hospital F/U       HPI:    Jazmin Clifton is a 84 year old  (12/30/1932),admitted to the [facility]  from [facility].  Hospital stay 8/31/17 through 9/3/17.  Admitted to this facility for  rehab, medical management and nursing care.  Current issues are:      Cerebrovascular accident (CVA), unspecified mechanism (H)  No new symptoms, did have L weakness which has resolved    Carotid artery stenosis, symptomatic, right  S/P carotid endarterectomy  Found to have significant R carotid artery stenosis and underwent elective R carotid endarterectomy last week, now returned to [facility] after brief hospital stay. She reports she is having pain in her head/neck and difficulty turning her head. She remains very fatigued and feels weak.       CODE STATUS/ADVANCE DIRECTIVES DISCUSSION:   CPR/Full code   Patient's living condition: lives alone    ALLERGIES:Accupril; Ace inhibitors; Augmentin; Levofloxacin; Morphine; and Norvasc [amlodipine besylate]  PAST MEDICAL HISTORY:  has a past medical history of ABDOMINAL PAIN GENERALIZED (3/15/2006); Abdominal pain, generalized (3/15/2006); Atherosclerosis of renal artery (H); BENIGN HYPERTENSION (5/1/2006); Benign neoplasm of scalp and skin of neck; Cardiac dysrhythmias NEC; Cerebral aneurysm, nonruptured; Depressive disorder, not elsewhere classified; Esophageal reflux; Female stress incontinence; 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  PAST SURGICAL HISTORY:  has a past surgical history that includes surgical  history of -; surgical history of -; surgical history of - (1996); surgical history of -; surgical history of -; cholecystectomy, laporoscopic (1997); hysterectomy, mirtha (1982); and Endarterectomy carotid (Right, 8/31/2017).  FAMILY HISTORY: family history includes Asthma in her son; CANCER in her brother and mother; CEREBROVASCULAR DISEASE in her father; DIABETES in her son; Eye Disorder in her son; GASTROINTESTINAL DISEASE in her son; HEART DISEASE in her father. There is no history of C.A.D., Hypertension, Breast Cancer, Cancer - colorectal, or Prostate Cancer.  SOCIAL HISTORY:  reports that she has quit smoking. She does not have any smokeless tobacco history on file. She reports that she drinks alcohol. She reports that she does not use illicit drugs.    Post Discharge Medication Reconciliation Status: discharge medications reconciled and changed, per note/orders (see AVS).  Current Outpatient Prescriptions   Medication Sig Dispense Refill     acetaminophen (TYLENOL) 325 MG tablet Take 2 tablets (650 mg) by mouth every 4 hours as needed for other (surgical pain) 100 tablet 0     potassium chloride (POTASSIUM CHLORIDE) 10 MEQ tablet Take 10 mEq by mouth daily       fluticasone-vilanterol (BREO ELLIPTA) 100-25 MCG/INH oral inhaler Inhale 1 puff into the lungs daily       triamcinolone (KENALOG) 0.1 % cream Apply topically 3 times daily       calcium-vitamin D (CALTRATE) 600-400 MG-UNIT per tablet Take 1 tablet by mouth daily       Cephalexin (KEFLEX PO) Take 500 mg by mouth 2 times daily       diphenhydrAMINE (BENADRYL) 2 % cream Apply topically 3 times daily as needed for itching        isradipine (DYNACIRC) 5 MG capsule Take 5 mg by mouth 2 times daily       magnesium citrate solution Take by mouth daily as needed for other Give 240 cc by mouth as needed for BOWEL HEALTH Day 3: If no BM results from Fleets enema by 1800 - give 240 cc of Magnesium Citrate. If no results by day 4 - notify MD.       magnesium  hydroxide (MILK OF MAGNESIA) 400 MG/5ML suspension Take 30 mLs by mouth daily as needed for constipation or heartburn       bisacodyl (DULCOLAX) 10 MG Suppository Place 10 mg rectally daily as needed for constipation       SODIUM PHOSPHATES RE Place 1 Dose rectally daily as needed Day 3: If no BM results from suppository by 1200 - administer Fleets enema RC per RSO.       clopidogrel (PLAVIX) 75 MG tablet Take 1 tablet (75 mg) by mouth daily 30 tablet 0     atorvastatin (LIPITOR) 40 MG tablet Take 1 tablet (40 mg) by mouth daily 30 tablet 3     isosorbide mononitrate (IMDUR) 30 MG 24 hr tablet Take 30 mg by mouth daily       Lactobacillus (ACIDOPHILUS PO) Take 1 capsule by mouth 2 times daily       Lidocaine-Menthol (ICY HOT LIDOCAINE PLUS MENTHOL EX) Externally apply topically 3 times daily Apply to hips       TRIMETHOPRIM PO Take 100 mg by mouth daily Give 1 tablet by mouth one time a day for preventative for UTI       Benzocaine-Glycerin-DM (CEPACOL DUAL RELIEF PO) Take 1 Dose by mouth every 2 hours as needed (sore throat)        Docusate Sodium (COLACE PO) Take 100 mg by mouth daily       Memantine HCl (NAMENDA PO) Take 5 mg by mouth daily       nystatin (MYCOSTATIN) 277326 UNIT/GM POWD Apply topically 2 times daily as needed        MAGNESIUM OXIDE PO Take 250 mg by mouth daily       tiotropium (SPIRIVA) 18 MCG capsule Inhale 18 mcg into the lungs daily       Acetaminophen (TYLENOL PO) Take 1,000 mg by mouth 3 times daily (Takes 2 x 500 mg = 1000 mg)       Furosemide (LASIX PO) Take 20 mg by mouth 2 times daily        Citalopram Hydrobromide (CELEXA PO) Take 20 mg by mouth daily        LISINOPRIL PO Take 30 mg by mouth daily        Gabapentin (NEURONTIN PO) Take 300 mg by mouth 2 times daily        Omeprazole (PRILOSEC PO) Take 20 mg by mouth every morning       cyanocobalamin (VITAMIN  B-12) 1000 MCG tablet Take 1,000 mcg by mouth daily         ROS:  10 point ROS of systems including Constitutional, Eyes,  Respiratory, Cardiovascular, Gastroenterology, Genitourinary, Integumentary, Muscularskeletal, Psychiatric were all negative except for pertinent positives noted in my HPI.    Exam:  /70  Pulse 68  Temp 98.3  F (36.8  C)  Resp 18  SpO2 93%  GENERAL APPEARANCE:  Alert, in no acute distress; fatigued  HEAD:  Normal, normocephalic, atraumatic  EYE EXAM: normal external eye, conjunctiva, lids, PAPI  NECK EXAM: stiff, no JVD, diffuse dark purple bruising with some yellowing and fading noted. incision of R scapular area is CDI,  glued , no erythema, no warmth, no drainage   CHEST/RESP:  respiratory effort normal, lung sounds CTA , no respiratory distress  CV:  Rate reg, rhythm reg, no murmur, 1+ peripheral edema bilateral LE  M/S:   extremities normal, gait normal-with 3 wheel rolling walker , normal muscle tone, and range of motion normal   SKIN EXAM: as noted above  NEUROLOGIC EXAM: Normal gross motor movement, tone and coordination. No tremor.  Cranial nerves 2-12 are normal tested and grossly at patient's baseline  PSYCH:  Alert and oriented to person-place-time , affect pleasant , judgement appropriate       Lab/Diagnostic data:     CBC RESULTS:   Recent Labs   Lab Test  08/31/17   1113  08/08/17   0636  07/28/17   0600   WBC   --   4.4  4.6   RBC   --   3.70*  3.73*   HGB   --   10.2*  10.3*   HCT   --   33.1*  33.2*   MCV   --   90  89   MCH   --   27.6  27.6   MCHC   --   30.8*  31.0*   RDW   --   14.4  14.0   PLT  172  142*  144*       Last Basic Metabolic Panel:  Recent Labs   Lab Test  08/31/17   1113  08/08/17   0636   NA  141  141   POTASSIUM  5.3  4.6   CHLORIDE  107  109   BLAIR  9.5  9.3   CO2  28  28   BUN  36*  30   CR  1.42*  1.36*   GLC  102*  86       Liver Function Studies - No results for input(s): PROTTOTAL, ALBUMIN, BILITOTAL, ALKPHOS, AST, ALT, BILIDIRECT in the last 81296 hours.    TSH   Date Value Ref Range Status   08/14/2017 0.62 0.40 - 4.00 mU/L Final   04/18/2007 0.60 0.4 - 5.0 mU/L  Final   ]    Lab Results   Component Value Date    A1C 6.2 08/31/2017       ASSESSMENT/PLAN:  Cerebrovascular accident (CVA), unspecified mechanism (H)  No new symptoms, continue therapy and current medical management, progressing with increased strength.     Carotid artery stenosis, symptomatic, right  S/P carotid endarterectomy  Incision CDI, no drainage. Does have some significant bruising, improving mobility and ongoing discomfort. Has had headache last few days as well. On scheduled tylenol and now with prn oxycodone low dose which has been helpful.     +UA/C  Noted upon preop H&P at PCP office to have + UA, started on keflex empirically and also on chronic daily suppression trimethoprim.  Reviewed Care Everywhere and noted to have UC+ for >100,000 col Staph epidermidis resistant to both keflex and trimethoprim.  She is having no symptoms, likely chronic bacteriuria at this time. She is afebrile, no dysuria, no burning, no elevated wbc. Will discontinue both antibiotics, and monitor for symptoms.     Orders:  1. DC Keflex  2. DC trimethoprim- she is asymptomatic, so likely chronically colonized, no treatment indicated.  3. DC prn tylenol- on scheduled tylenol  4. MDD tylenol 3 gm  5. Lab draw 9/8/17 to include CBC and BMP Dx anemia and CKD.      Electronically signed by:  Kim Hidalgo CNP   [facility]  [phone] cell

## 2017-09-06 ENCOUNTER — TELEPHONE (OUTPATIENT)
Dept: OTHER | Facility: CLINIC | Age: 82
End: 2017-09-06

## 2017-09-06 NOTE — TELEPHONE ENCOUNTER
Left  for pt requesting her to call us to make a post op f/u appt with Dr. Parry.     Sully Downs, RN, BSN.

## 2017-09-11 ENCOUNTER — HOSPITAL LABORATORY (OUTPATIENT)
Facility: OTHER | Age: 82
End: 2017-09-11

## 2017-09-11 VITALS
BODY MASS INDEX: 33.83 KG/M2 | HEART RATE: 69 BPM | DIASTOLIC BLOOD PRESSURE: 68 MMHG | RESPIRATION RATE: 14 BRPM | SYSTOLIC BLOOD PRESSURE: 127 MMHG | OXYGEN SATURATION: 94 % | WEIGHT: 191 LBS | TEMPERATURE: 98.1 F

## 2017-09-11 LAB
ANION GAP SERPL CALCULATED.3IONS-SCNC: 7 MMOL/L (ref 3–14)
BUN SERPL-MCNC: 34 MG/DL (ref 7–30)
CALCIUM SERPL-MCNC: 9.4 MG/DL (ref 8.5–10.1)
CHLORIDE SERPL-SCNC: 109 MMOL/L (ref 94–109)
CO2 SERPL-SCNC: 28 MMOL/L (ref 20–32)
CREAT SERPL-MCNC: 1.09 MG/DL (ref 0.52–1.04)
ERYTHROCYTE [DISTWIDTH] IN BLOOD BY AUTOMATED COUNT: 15.7 % (ref 10–15)
GFR SERPL CREATININE-BSD FRML MDRD: 48 ML/MIN/1.7M2
GLUCOSE SERPL-MCNC: 82 MG/DL (ref 70–99)
HCT VFR BLD AUTO: 31.5 % (ref 35–47)
HGB BLD-MCNC: 9.8 G/DL (ref 11.7–15.7)
MCH RBC QN AUTO: 27.8 PG (ref 26.5–33)
MCHC RBC AUTO-ENTMCNC: 31.1 G/DL (ref 31.5–36.5)
MCV RBC AUTO: 89 FL (ref 78–100)
PLATELET # BLD AUTO: 173 10E9/L (ref 150–450)
POTASSIUM SERPL-SCNC: 4.3 MMOL/L (ref 3.4–5.3)
RBC # BLD AUTO: 3.53 10E12/L (ref 3.8–5.2)
SODIUM SERPL-SCNC: 144 MMOL/L (ref 133–144)
WBC # BLD AUTO: 4.4 10E9/L (ref 4–11)

## 2017-09-12 ENCOUNTER — OFFICE VISIT (OUTPATIENT)
Dept: VASCULAR SURGERY | Facility: CLINIC | Age: 82
End: 2017-09-12
Payer: COMMERCIAL

## 2017-09-12 ENCOUNTER — NURSING HOME VISIT (OUTPATIENT)
Dept: GERIATRICS | Facility: CLINIC | Age: 82
End: 2017-09-12
Payer: COMMERCIAL

## 2017-09-12 VITALS — RESPIRATION RATE: 16 BRPM | SYSTOLIC BLOOD PRESSURE: 157 MMHG | HEART RATE: 73 BPM | DIASTOLIC BLOOD PRESSURE: 68 MMHG

## 2017-09-12 DIAGNOSIS — Z98.890 S/P CAROTID ENDARTERECTOMY: Primary | ICD-10-CM

## 2017-09-12 DIAGNOSIS — I65.21 CAROTID ARTERY STENOSIS, SYMPTOMATIC, RIGHT: ICD-10-CM

## 2017-09-12 DIAGNOSIS — N18.30 CKD (CHRONIC KIDNEY DISEASE) STAGE 3, GFR 30-59 ML/MIN (H): ICD-10-CM

## 2017-09-12 DIAGNOSIS — Z09 FOLLOW-UP EXAMINATION FOLLOWING SURGERY: Primary | ICD-10-CM

## 2017-09-12 DIAGNOSIS — I10 ESSENTIAL HYPERTENSION, BENIGN: ICD-10-CM

## 2017-09-12 DIAGNOSIS — R21 RASH: ICD-10-CM

## 2017-09-12 PROCEDURE — 99024 POSTOP FOLLOW-UP VISIT: CPT | Performed by: SURGERY

## 2017-09-12 PROCEDURE — 99310 SBSQ NF CARE HIGH MDM 45: CPT | Performed by: FAMILY MEDICINE

## 2017-09-12 NOTE — PROGRESS NOTES
[place] [facility]    Chief Complaint   Patient presents with     Nursing Home Acute       HPI:    Jazmin Clifton is a 84 year old  (12/30/1932), who is being seen today for an episodic care visit at [facility] .  HPI information obtained from: facility chart records, facility staff and patient report.    Today's concern is:  ------------------------  - Underwent elective right CEA, saw surgeon today, discussed about difficulty swallowing and change in the voice, reassured things will be better in 3-4 weeks  - reports no more dizziness spell.  - reports itching spot over left eyebrow, was given some cream today which helped.       ------------------------------------------  # Past Medical, social, family histories, medications, and allergies reviewed and updated  # Medications reviewed: in the chart and EHR.       REVIEW OF SYSTEMS:  4 point ROS including Respiratory, CV, GI and , other than that noted in the HPI,  is negative    Physical Exam:  /68  Pulse 69  Temp 98.1  F (36.7  C)  Resp 14  Wt 191 lb (86.6 kg)  SpO2 94%  BMI 33.83 kg/m2  GENERAL APPEARANCE:  Alert, in no distress  NECK:  Surgical site over right side of the neck is slightly edematous no erythema. no  thyromegaly  RESP:  respiratory effort and palpation of chest normal, lungs clear to auscultation , no respiratory distress  CV:  Palpation and auscultation of heart done , regular rate and rhythm, no murmur, rub, or gallop, peripheral edema 2+ in pedal- pitting b/l.   ABDOMEN:  normal bowel sounds, soft, nontender, no hepatosplenomegaly or other masses  SKIN: surgical site over right CEA healing, fading erythema proximal to left eyebrow and slight rash over left eyebrow.   NEURO:   Cranial nerves 2-12 are normal tested and grossly at patient's baseline, no purposeful movement in upper and lower extremities. No facial droop.   PSYCH:  AAOx3. Mood and affect appropriate.     Recent Labs:    Results for orders  placed or performed in visit on 09/11/17   Basic metabolic panel   Result Value Ref Range    Sodium 144 133 - 144 mmol/L    Potassium 4.3 3.4 - 5.3 mmol/L    Chloride 109 94 - 109 mmol/L    Carbon Dioxide 28 20 - 32 mmol/L    Anion Gap 7 3 - 14 mmol/L    Glucose 82 70 - 99 mg/dL    Urea Nitrogen 34 (H) 7 - 30 mg/dL    Creatinine 1.09 (H) 0.52 - 1.04 mg/dL    GFR Estimate 48 (L) >60 mL/min/1.7m2    GFR Estimate If Black 58 (L) >60 mL/min/1.7m2    Calcium 9.4 8.5 - 10.1 mg/dL   CBC with platelets   Result Value Ref Range    WBC 4.4 4.0 - 11.0 10e9/L    RBC Count 3.53 (L) 3.8 - 5.2 10e12/L    Hemoglobin 9.8 (L) 11.7 - 15.7 g/dL    Hematocrit 31.5 (L) 35.0 - 47.0 %    MCV 89 78 - 100 fl    MCH 27.8 26.5 - 33.0 pg    MCHC 31.1 (L) 31.5 - 36.5 g/dL    RDW 15.7 (H) 10.0 - 15.0 %    Platelet Count 173 150 - 450 10e9/L         Assessment/Plan:  S/P carotid endarterectomy  Carotid artery stenosis, symptomatic, right  - doing fine, no more dizziness.   - expect dysphagia and hypophonia to improve.   - breathing well  - follow on the VS recommendations    Essential hypertension, benign   BP Readings from Last 3 Encounters:   09/11/17 127/68   09/12/17 157/68   09/05/17 136/70   - controlled.   - Keep SBP> 130 mmHg and DBP > 65 mmHg (levels below these increase mortality as shown by standard studies and observations).     CKD (chronic kidney disease) stage 3, GFR 30-59 ml/min  - stage 3a from 3b, improving  - - Avoid nephrotoxic drugs  - Renal dose the medications.       Rash  - continue conservative management.     Orders:  - See above, otherwise, continue the rest of the current POC.     Electronically signed by  Toño Shafer MD

## 2017-09-12 NOTE — LETTER
[facility] [address]  [place] [address]  Phone: [phone]  Fax: [phone]        2017    RE:  Jazmin Clifton-:  [birth year]    83 y/o female s/p right carotid endarterectomy wound healing nicely some difficulty with swallowing solids.  This should resolve over time, probably secondary to traction on the ansa.  Discussed operation, wound care , activity, and swallowing issues in detail.  U/S as per protocol and F/U with regard top swallowing in 3-4 weeks.    JUAN Parry III, MD

## 2017-09-12 NOTE — MR AVS SNAPSHOT
"              After Visit Summary   9/12/2017    Jazmin Clifton    MRN: [id number]           Patient Information     Date Of Birth          12/30/1932        Visit Information        Provider Department      9/12/2017 10:15 AM Hilario Parry MD [facility]        Today's Diagnoses     Follow-up examination following surgery    -  1       Follow-ups after your visit        Your next 10 appointments already scheduled     Sep 25, 2017  9:30 AM CDT   New Visit with Heather Whitaker MD   [facility] ([facility])    [address] [facility] [address] [phone]              Who to contact     If you have questions or need follow up information about today's clinic visit or your schedule please contact [facility] directly at [phone].  Normal or non-critical lab and imaging results will be communicated to you by MyChart, letter or phone within 4 business days after the clinic has received the results. If you do not hear from us within 7 days, please contact the clinic through MyChart or phone. If you have a critical or abnormal lab result, we will notify you by phone as soon as possible.  Submit refill requests through [facility] or call your pharmacy and they will forward the refill request to us. Please allow 3 business days for your refill to be completed.          Additional Information About Your Visit        MyChart Information     [facility] lets you send messages to your doctor, view your test results, renew your prescriptions, schedule appointments and more. To sign up, go to [email or web address] . Click on \"Log in\" on the left side of the screen, which will take you to the Welcome page. Then click on \"Sign up Now\" on the right side of the page.     You will be asked to enter the access code listed below, as well as some personal information. Please follow the directions to create your username and password.     Your access code is: " [email or web address]  Expires: 10/26/2017  1:54 PM     Your access code will  in 90 days. If you need help or a new code, please call your [place] clinic or [phone].        Care EveryWhere ID     This is your Care EveryWhere ID. This could be used by other organizations to access your [place] medical records  [id number]        Your Vitals Were     Pulse Respirations                73 16           Blood Pressure from Last 3 Encounters:   17 127/68   17 157/68   17 136/70    Weight from Last 3 Encounters:   17 191 lb (86.6 kg)   17 189 lb 9.5 oz (86 kg)   17 189 lb (85.7 kg)              Today, you had the following     No orders found for display       Primary Care Provider Office Phone # Fax #    Mirian Aguilera [phone]       [facility] [id number] [place] [id number]        Equal Access to Services     CATARINO MCADAMS : Hadii aad ku hadasho Soomaali, waaxda luqadaha, qaybta kaalmada adeegyada, waxay idiin hayadenn bren khgalindo reed . So [facility] [phone].    ATENCIÓN: Si habla español, tiene a ortega disposición servicios gratuitos de asistencia lingüística. Llame al [phone].    We comply with applicable federal civil rights laws and [place] laws. We do not discriminate on the basis of race, color, national origin, age, disability sex, sexual orientation or gender identity.            Thank you!     Thank you for choosing [facility]  for your care. Our goal is always to provide you with excellent care. Hearing back from our patients is one way we can continue to improve our services. Please take a few minutes to complete the written survey that you may receive in the mail after your visit with us. Thank you!             Your Updated Medication List - Protect others around you: Learn how to safely use, store and throw away your medicines at www.disposemymeds.org.          This list is accurate as of: 17 11:08  AM.  Always use your most recent med list.                   Brand Name Dispense Instructions for use Diagnosis    ACIDOPHILUS PO      Take 1 capsule by mouth 2 times daily        atorvastatin 40 MG tablet    LIPITOR    30 tablet    Take 1 tablet (40 mg) by mouth daily    Stenosis of right carotid artery       bisacodyl 10 MG Suppository    DULCOLAX     Place 10 mg rectally daily as needed for constipation        BREO ELLIPTA 100-25 MCG/INH oral inhaler   Generic drug:  fluticasone-vilanterol      Inhale 1 puff into the lungs daily        calcium-vitamin D 600-400 MG-UNIT per tablet    CALTRATE     Take 1 tablet by mouth daily        CELEXA PO      Take 20 mg by mouth daily        CEPACOL DUAL RELIEF PO      Take 1 Dose by mouth every 2 hours as needed (sore throat)        clopidogrel 75 MG tablet    PLAVIX    30 tablet    Take 1 tablet (75 mg) by mouth daily    Facial droop       COLACE PO      Take 100 mg by mouth daily        cyanocobalamin 1000 MCG tablet    vitamin  B-12     Take 1,000 mcg by mouth daily        diphenhydrAMINE 2 % cream    BENADRYL     Apply topically 3 times daily as needed for itching        ICY HOT LIDOCAINE PLUS MENTHOL EX      Externally apply topically 3 times daily Apply to hips        isosorbide mononitrate 30 MG 24 hr tablet    IMDUR     Take 30 mg by mouth daily        isradipine 5 MG capsule    DYNACIRC     Take 5 mg by mouth 2 times daily        LASIX PO      Take 20 mg by mouth 2 times daily        LISINOPRIL PO      Take 30 mg by mouth daily        magnesium citrate solution      Take by mouth daily as needed for other Give 240 cc by mouth as needed for BOWEL HEALTH Day 3: If no BM results from Fleets enema by 1800 - give 240 cc of Magnesium Citrate. If no results by day 4 - notify MD.        magnesium hydroxide 400 MG/5ML suspension    MILK OF MAGNESIA     Take 30 mLs by mouth daily as needed for constipation or heartburn        MAGNESIUM OXIDE PO      Take 250 mg by mouth daily         NAMENDA PO      Take 5 mg by mouth daily        NEURONTIN PO      Take 300 mg by mouth 2 times daily        nystatin 707691 UNIT/GM Powd    MYCOSTATIN     Apply topically 2 times daily as needed        OXYCODONE HCL PO      Take 2.5-5 mg by mouth every 3 hours as needed        potassium chloride 10 MEQ tablet   Generic drug:  potassium chloride      Take 10 mEq by mouth daily        PRILOSEC PO      Take 20 mg by mouth every morning        SODIUM PHOSPHATES RE      Place 1 Dose rectally daily as needed Day 3: If no BM results from suppository by 1200 - administer Nils enema RC per RSO.        tiotropium 18 MCG capsule    SPIRIVA     Inhale 18 mcg into the lungs daily        triamcinolone 0.1 % cream    KENALOG     Apply topically 3 times daily        TYLENOL PO      Take 1,000 mg by mouth 3 times daily (Takes 2 x 500 mg = 1000 mg)

## 2017-09-12 NOTE — PROGRESS NOTES
83 y/o female s/p right carotid endarterectomy wound healing nicely some difficulty with swallowing solids.  This should resolve over time, probably secondary to traction on the ansa.  Discussed operation, wound care , activity, and swallowing issues in detail.  U/S as per protocol and F/U with regard top swallowing in 3-4 weeks.

## 2017-09-12 NOTE — NURSING NOTE
"Chief Complaint   Patient presents with     Post-Op - Vascular       Initial /68 (BP Location: Right arm, Patient Position: Chair, Cuff Size: Adult Regular)  Pulse 73  Resp 16 Estimated body mass index is 33.83 kg/(m^2) as calculated from the following:    Height as of 8/31/17: 1.6 m (5' 3\").    Weight as of 9/11/17: 86.6 kg (191 lb).  Medication Reconciliation: complete.   montserrat gramajo LPN      "

## 2017-09-14 DIAGNOSIS — I65.29 CAROTID ARTERY STENOSIS: Primary | ICD-10-CM

## 2017-09-19 VITALS
HEART RATE: 64 BPM | RESPIRATION RATE: 20 BRPM | TEMPERATURE: 97.3 F | SYSTOLIC BLOOD PRESSURE: 137 MMHG | BODY MASS INDEX: 34.01 KG/M2 | OXYGEN SATURATION: 91 % | DIASTOLIC BLOOD PRESSURE: 67 MMHG | WEIGHT: 192 LBS

## 2017-09-19 NOTE — PROGRESS NOTES
[facility] DISCHARGE SUMMARY    PATIENT'S NAME: Jazmin Clifton  YOB: 1932  MEDICAL RECORD NUMBER:  [id number]    PRIMARY CARE PROVIDER AND CLINIC RESPONSIBLE AFTER TRANSFER: Mirian Aguilera [facility] [id number] [address] / CATIE WE[address]ER *     OR will establish care with new in house provider at assisted living facility    CODE STATUS/ADVANCE DIRECTIVES DISCUSSION:   CPR/Full code        Allergies   Allergen Reactions     Accupril      Ace Inhibitors Unknown     Accupril     Augmentin Unknown     Levofloxacin Unknown     Morphine Other (See Comments)     hallucinations     Norvasc [Amlodipine Besylate]      Leg swelling         TRANSFERRING PROVIDERS: Kim Hidalgo CNP, Toño Shafer MD   DATE OF SNF ADMISSION:  July / 21 / 2017  DATE OF SNF (anticipated) DISCHARGE: September / 24 / 2017  DISCHARGE DISPOSITION: Non-[facility] Provider   Nursing Facility: [facility] stay 7/20/17 to 7/21/17.     Condition on Discharge:  Improving.  Function:  Ambulating with 3wheel rolling walker independently and safely, has power wheelchair for longer distances  Cognitive Scores: CPT 4.6  , SLUMS 21/30, BIMS 15/15  Equipment: walker and power scooter    DISCHARGE DIAGNOSIS:   1. Carotid artery stenosis, symptomatic, right    2. S/P carotid endarterectomy    3. Cerebral aneurysm, nonruptured    4. Essential hypertension, benign    5. Cerebrovascular accident (CVA), unspecified mechanism (H)    6. Transient cerebral ischemia, unspecified type    7. Chronic congestive heart failure, unspecified congestive heart failure type (H)    8. CKD (chronic kidney disease) stage 3, GFR 30-59 ml/min    9. Trochanteric bursitis of right hip    10. Trigger point of extremity    11. Urinary tract infection without hematuria, site unspecified    12. Thyroid nodule    13. Osteoarthritis of right hip, unspecified osteoarthritis type    14. DDD (degenerative disc disease), lumbar     15. S/P laminectomy    16. Mild cognitive impairment        HPI Nursing Facility Course:  HPI information obtained from: facility chart records, facility staff and patient report.  Carotid stenosis, symptomatic, right  Found to have >70% occlusion of R carotid artery, likely cause for TIA/CVA symptoms of acute L facial droop. Followed by Vascular surgery at [place], Dr. Hilario Parry. On plavix, statin.     S/P carotid endarterectomy  Underwent R carotid endarterectomy with dacron patch angioplasty on 8/31/17 with success. Incision is now well healed and, does have some mild tenderness/swelling under chin which is slowly resolving. She has improved mobility to neck, no headache, no dizziness.     Cerebral aneurysm, nonruptured  Not repaired, on L, followed by vascular; R middle cerebral artery bifurcation aneurysm previously clipped several years ago    Essential hypertension, benign  On isradipine, isosorbide mononitrate, lisinopril. Generally normotensive on current regimen, goal BP <140/80 to reduce risk of falls, hypotension. The current medical regimen is effective;  continue present plan and medications.   BP Readings from Last 6 Encounters:   09/19/17 137/67   09/11/17 127/68   09/12/17 157/68   09/05/17 136/70   09/01/17 138/63   08/22/17 163/74         Cerebrovascular accident (CVA), unspecified mechanism (H)  Transient cerebral ischemia, unspecified type  Thought likely due to R carotid stenosis, and all symptoms have resolved with therapy. She is ambulatory and actually doing very well. She has no sided weakness, no deficits.     Chronic congestive heart failure, unspecified congestive heart failure type (H)  On lasix, without recent exacerbation and doing very well.     CKD (chronic kidney disease) stage 3, GFR 30-59 ml/min  Creatinine   Date Value Ref Range Status   09/11/2017 1.09 (H) 0.52 - 1.04 mg/dL Final   ] Avoid nephrotoxic medications, Renal dosing of medications, this creat is about her  normal.     Osteoarthritis of right hip, unspecified osteoarthritis type  Trochanteric bursitis of right hip  Trigger point of extremity  Previously found to have long standing history of R hip pain, causing her to use narcotic for pain control. Xray R hip at [facility] in 1/2017 shows moderate R hip degenerative changes. She did receive trigger point injections of R hip by Dr. Shafer while in TCU with complete resolution of R hip pain. She does have small amount of low dose oxycodone available if needed and is on scheduled tylenol which could likely be tapered to prn if tolerated.     Urinary tract infection without hematuria, site unspecified  Long standing history of recurrent UTI, had previously been on daily trimethoprim by PCP. Most recent UC grew staph epidermis, likely chronically colonized as she was asymptomatic. She was started empirically on antibiotics, but these were stopped as she was asymptomatic. She has had no recurrence of symptoms.     Thyroid nodule  Incidentally noted on US of the neck on 7/26/17 in [place] system-L thyroid nodule measuring 2.3 x 2.4 x 3.2 cm. TSH within normal limits, this nodule should have follow up.   TSH   Date Value Ref Range Status   08/14/2017 0.62 0.40 - 4.00 mU/L Final   ]     DDD (degenerative disc disease), lumbar  S/P laminectomy  Chronic, no pain. Doing well    Mild cognitive impairment  On low dose namenda and she is really doing very well. She is oriented and her memory is quite good. She was previously living alone, now planning assisted living facility.       PAST MEDICAL HISTORY:  has a past medical history of ABDOMINAL PAIN GENERALIZED (3/15/2006); Abdominal pain, generalized (3/15/2006); Atherosclerosis of renal artery (H); BENIGN HYPERTENSION (5/1/2006); Benign neoplasm of scalp and skin of neck; Cardiac dysrhythmias NEC; Cerebral aneurysm, nonruptured; Depressive disorder, not elsewhere classified; Esophageal reflux; Female stress incontinence;  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    DISCHARGE MEDICATIONS:  Current Outpatient Prescriptions   Medication Sig Dispense Refill     PANTOPRAZOLE SODIUM PO Take 40 mg by mouth every morning (before breakfast)       OXYCODONE HCL PO Take 2.5-5 mg by mouth every 3 hours as needed       potassium chloride (POTASSIUM CHLORIDE) 10 MEQ tablet Take 10 mEq by mouth daily       fluticasone-vilanterol (BREO ELLIPTA) 100-25 MCG/INH oral inhaler Inhale 1 puff into the lungs daily       triamcinolone (KENALOG) 0.1 % cream Apply topically 3 times daily       calcium-vitamin D (CALTRATE) 600-400 MG-UNIT per tablet Take 1 tablet by mouth daily       diphenhydrAMINE (BENADRYL) 2 % cream Apply topically 3 times daily as needed for itching        isradipine (DYNACIRC) 5 MG capsule Take 5 mg by mouth 2 times daily       bisacodyl (DULCOLAX) 10 MG Suppository Place 10 mg rectally daily as needed for constipation       clopidogrel (PLAVIX) 75 MG tablet Take 1 tablet (75 mg) by mouth daily 30 tablet 0     atorvastatin (LIPITOR) 40 MG tablet Take 1 tablet (40 mg) by mouth daily 30 tablet 3     isosorbide mononitrate (IMDUR) 30 MG 24 hr tablet Take 30 mg by mouth daily       Lactobacillus (ACIDOPHILUS PO) Take 1 capsule by mouth 2 times daily       Lidocaine-Menthol (ICY HOT LIDOCAINE PLUS MENTHOL EX) Externally apply topically 3 times daily Apply to hips       Benzocaine-Glycerin-DM (CEPACOL DUAL RELIEF PO) Take 1 Dose by mouth every 2 hours as needed (sore throat)        Docusate Sodium (COLACE PO) Take 100 mg by mouth daily       Memantine HCl (NAMENDA PO) Take 5 mg by mouth daily       nystatin (MYCOSTATIN) 484620 UNIT/GM POWD Apply topically 2 times daily as needed        MAGNESIUM OXIDE PO  Take 250 mg by mouth daily       tiotropium (SPIRIVA) 18 MCG capsule Inhale 18 mcg into the lungs daily       Acetaminophen (TYLENOL PO) Take 1,000 mg by mouth 3 times daily (Takes 2 x 500 mg = 1000 mg)       Furosemide (LASIX PO) Take 20 mg by mouth 2 times daily        Citalopram Hydrobromide (CELEXA PO) Take 20 mg by mouth daily        LISINOPRIL PO Take 30 mg by mouth daily        Gabapentin (NEURONTIN PO) Take 300 mg by mouth 2 times daily        cyanocobalamin (VITAMIN  B-12) 1000 MCG tablet Take 1,000 mcg by mouth daily         MEDICATION CHANGES/RATIONALE:   Changed omeprazole to pantoprazole due to interaction with clopidogrel    Controlled medications sent with patient: Script for Oxycodone 2.5  -5 mg Q 3 hr prn pain medication for 30 tabs and 0 refills given to patient at dischage to have them fill at their out patient pharmacy     Review of Systems:  No CP, SOB, Cough, dizziness, fevers, chills, HA, N/V, dysuria or Bowel Abnormalities. Appetite is good.  Pain none    /67  Pulse 64  Temp 97.3  F (36.3  C)  Resp 20  Wt 192 lb (87.1 kg)  SpO2 91%  BMI 34.01 kg/m2    Physical Exam:  A & O x 4, NAD. Lungs CTA, non labored. RRR, S1/S2 w/o murmur,gallop or rub.  No edema.  Abdomen soft, nontender.      DISCHARGE PLAN:  [facility]  Patient instructed to follow-up with:  PCP in 7-10 days or establish care with new in house provider       [facility] scheduled appointments:  Future Appointments  Date Time Provider Department Center   9/25/2017 9:30 AM Heather Whitaker MD [facility]       [facility] referral needed and placed by this provider: No    Pending labs: none  SNF labs   Results for orders placed or performed in visit on 09/11/17   Basic metabolic panel   Result Value Ref Range    Sodium 144 133 - 144 mmol/L    Potassium 4.3 3.4 - 5.3 mmol/L    Chloride 109 94 - 109 mmol/L    Carbon Dioxide 28 20 - 32 mmol/L    Anion Gap 7 3 - 14 mmol/L    Glucose 82 70 - 99 mg/dL    Urea Nitrogen 34 (H) 7  - 30 mg/dL    Creatinine 1.09 (H) 0.52 - 1.04 mg/dL    GFR Estimate 48 (L) >60 mL/min/1.7m2    GFR Estimate If Black 58 (L) >60 mL/min/1.7m2    Calcium 9.4 8.5 - 10.1 mg/dL   CBC with platelets   Result Value Ref Range    WBC 4.4 4.0 - 11.0 10e9/L    RBC Count 3.53 (L) 3.8 - 5.2 10e12/L    Hemoglobin 9.8 (L) 11.7 - 15.7 g/dL    Hematocrit 31.5 (L) 35.0 - 47.0 %    MCV 89 78 - 100 fl    MCH 27.8 26.5 - 33.0 pg    MCHC 31.1 (L) 31.5 - 36.5 g/dL    RDW 15.7 (H) 10.0 - 15.0 %    Platelet Count 173 150 - 450 10e9/L       Discharge Treatments:  1. Okay to discharge to [facility] with all current meds, treatments and narcotics.  2. Make appt with PCP for 7-10 days or must establis care with new PCP and intermediate.  3. PT/OT/RN to eval and treat.      TOTAL DISCHARGE TIME:   Greater than 30 minutes  Electronically signed by:  Kim Hidalgo CNP   [facility]  [phone] cell     Documentation of Face to Face and Certification for Home Health Services    I certify that patient: Jazmin Clifton is under my care and that I, or a nurse practitioner or physician's assistant working with me, had a face-to-face encounter that meets the physician face-to-face encounter requirements with this patient on: 9/19/2017.    This encounter with the patient was in whole, or in part, for the following medical condition, which is the primary reason for home health care: S/P Carotid Endarterectomy, CKD stage 3, HTN..    I certify that, based on my findings, the following services are medically necessary home health services: Nursing, Occupational Therapy and Physical Therapy.    My clinical findings support the need for the above services because: Nurse is needed: To provide caregiver training to assist with: S/P Carotid Endarterectomy, CKD stage 3, HTN...., Occupational Therapy Services are needed to assess and treat cognitive ability and address ADL safety due to impairment in S/P Carotid Endarterectomy, CKD stage 3, HTN... and  Physical Therapy Services are needed to assess and treat the following functional impairments: S/P Carotid Endarterectomy, CKD stage 3, HTN...    Further, I certify that my clinical findings support that this patient is homebound (i.e. absences from home require considerable and taxing effort and are for medical reasons or [employer] services or infrequently or of short duration when for other reasons) because: Requires assistance of another person or specialized equipment to access medical services because patient: Requires supervision of another for safe transfer...    Based on the above findings. I certify that this patient is confined to the home and needs intermittent skilled nursing care, physical therapy and/or speech therapy.  The patient is under my care, and I have initiated the establishment of the plan of care.  This patient will be followed by a physician who will periodically review the plan of care.  Physician/Provider to provide follow up care: Mirian Aguilera    Attending hospital physician (the Medicare certified [facility] provider): MARY Tejada CNP   Physician Signature: See electronic signature associated with these discharge orders.  Date: 9/19/2017

## 2017-09-21 ENCOUNTER — DISCHARGE SUMMARY NURSING HOME (OUTPATIENT)
Dept: GERIATRICS | Facility: CLINIC | Age: 82
End: 2017-09-21
Payer: COMMERCIAL

## 2017-09-21 DIAGNOSIS — M51.369 DDD (DEGENERATIVE DISC DISEASE), LUMBAR: ICD-10-CM

## 2017-09-21 DIAGNOSIS — G31.84 MILD COGNITIVE IMPAIRMENT: ICD-10-CM

## 2017-09-21 DIAGNOSIS — I65.21 CAROTID ARTERY STENOSIS, SYMPTOMATIC, RIGHT: Primary | ICD-10-CM

## 2017-09-21 DIAGNOSIS — N18.30 CKD (CHRONIC KIDNEY DISEASE) STAGE 3, GFR 30-59 ML/MIN (H): ICD-10-CM

## 2017-09-21 DIAGNOSIS — M16.11 OSTEOARTHRITIS OF RIGHT HIP, UNSPECIFIED OSTEOARTHRITIS TYPE: ICD-10-CM

## 2017-09-21 DIAGNOSIS — G45.9 TRANSIENT CEREBRAL ISCHEMIA, UNSPECIFIED TYPE: ICD-10-CM

## 2017-09-21 DIAGNOSIS — E04.1 THYROID NODULE: ICD-10-CM

## 2017-09-21 DIAGNOSIS — N39.0 URINARY TRACT INFECTION WITHOUT HEMATURIA, SITE UNSPECIFIED: ICD-10-CM

## 2017-09-21 DIAGNOSIS — Z98.890 S/P CAROTID ENDARTERECTOMY: ICD-10-CM

## 2017-09-21 DIAGNOSIS — M70.61 TROCHANTERIC BURSITIS OF RIGHT HIP: ICD-10-CM

## 2017-09-21 DIAGNOSIS — I10 ESSENTIAL HYPERTENSION, BENIGN: ICD-10-CM

## 2017-09-21 DIAGNOSIS — I63.9 CEREBROVASCULAR ACCIDENT (CVA), UNSPECIFIED MECHANISM (H): ICD-10-CM

## 2017-09-21 DIAGNOSIS — I50.9 CHRONIC CONGESTIVE HEART FAILURE, UNSPECIFIED CONGESTIVE HEART FAILURE TYPE: ICD-10-CM

## 2017-09-21 DIAGNOSIS — I67.1 CEREBRAL ANEURYSM, NONRUPTURED: Chronic | ICD-10-CM

## 2017-09-21 DIAGNOSIS — Z98.890 S/P LAMINECTOMY: ICD-10-CM

## 2017-09-21 DIAGNOSIS — M79.609 TRIGGER POINT OF EXTREMITY: ICD-10-CM

## 2017-09-21 PROCEDURE — 99316 NF DSCHRG MGMT 30 MIN+: CPT | Performed by: NURSE PRACTITIONER

## 2017-09-22 PROBLEM — G31.84 MILD COGNITIVE IMPAIRMENT: Status: ACTIVE | Noted: 2017-09-22

## 2017-09-26 ENCOUNTER — TELEPHONE (OUTPATIENT)
Dept: OTHER | Facility: CLINIC | Age: 82
End: 2017-09-26

## 2017-09-26 NOTE — TELEPHONE ENCOUNTER
Per 's 9/12/17 OV note:  F/U with regard top swallowing in 3-4 weeks    Called pt to schedule for follow-up.  Pt states that her swallowing and voice are back to normal and she will f/u as directed at the three month robe with US.    Arely Schumacher, ADRIANAN, RN

## 2017-09-28 ENCOUNTER — OFFICE VISIT (OUTPATIENT)
Dept: NEUROLOGY | Facility: CLINIC | Age: 82
End: 2017-09-28
Payer: COMMERCIAL

## 2017-09-28 ENCOUNTER — TELEPHONE (OUTPATIENT)
Dept: NEUROLOGY | Facility: CLINIC | Age: 82
End: 2017-09-28

## 2017-09-28 VITALS — HEART RATE: 62 BPM | DIASTOLIC BLOOD PRESSURE: 59 MMHG | OXYGEN SATURATION: 100 % | SYSTOLIC BLOOD PRESSURE: 120 MMHG

## 2017-09-28 DIAGNOSIS — Z98.890 STATUS POST CAROTID SURGERY: ICD-10-CM

## 2017-09-28 DIAGNOSIS — Z86.73 HISTORY OF TIA (TRANSIENT ISCHEMIC ATTACK) AND STROKE: Primary | ICD-10-CM

## 2017-09-28 PROCEDURE — 99204 OFFICE O/P NEW MOD 45 MIN: CPT | Performed by: PSYCHIATRY & NEUROLOGY

## 2017-09-28 NOTE — PROGRESS NOTES
INITIAL NEUROLOGY CONSULTATION    DATE OF VISIT: 9/28/2017  MRN: [id number]  PATIENT NAME: Jazmin Clifton  YOB: 1932    REFERRING PROVIDER: No ref. provider found    Chief Complaint   Patient presents with     New Patient     CVA and facial droop.  Referral by Kenton Blackwood MD.       SUBJECTIVE:                                                      HPI:   Jazmin Clifton is a 84 year old female referred by Kenton Blackwood for hospital follow-up. The patient presented to the [facility] ED with Left facial droop on 7.26.17. The patient had apparently had similar symptoms a few weeks prior. The patient reported some other vague speech difficulties and visual disturbance as well as Left hand weakness - these symptoms dissipated by the time she was admitted and all that remained was the facial weakness. The patient has additional history of Rt MCA aneurysm clipping and prior Rt CR lacune. When she was admitted for the recent event, she had been on aspirin and simvastatin. The patient underwent MRI which was unrevealing. MRA showed slightly increased aneurysm size and artifacts due to the clip. Carotid ultrasound showed >70% stenosis at the Right carotid bifurcation.     The patient was diagnosed with stroke despite the MRI. She was switched to  Plavix for antiplatelet therapy and then referred to me to consider vascular consult. The simvastatin was also replaced with lovastatin (now on atorvastatin).  The patient says that she already saw vascular and had a Rt carotid endarterectomy. She is now on plavix for antiplatelet therapy, however the brief note available in Epic by Dr. Parry indicates that she was on dual antiplatelet therapy.     The patient says that she continues to have some Left-sided weakness which she says is not new. This is perhaps slightly worse since the recent event. She tells me that a Dr. Ibarra for monitoring the aneurysm. She has not yet seen her since the event in July 2017. She plans to  "follow-up with him in the near future.     No new weakness. The patient is here with a friend who spends a lot of time with her. The patient continues to do physical therapy at her living facility. She does not think that this is helping. She walks with a walker and has not had any falls. She seems more reliant on the walker lately. She noticed some difficulty with swallow since the CEA which she was told is normal. No new concerns.       Past Medical History:   Diagnosis Date     ABDOMINAL PAIN GENERALIZED 3/15/2006    1 month of abdominal pain that bryn radiates into her back and chest.  Pt was hospitilzed 2x for the pain at [facility] --pt hopsitized for 3 days.  Rcords not ab=vailable.  She was told either \" twisted intestine or blockage of bowel.  Pt feels it is the hiatal hernia.  Pt hospitilized again a second time  A month ago.  Ct scans x 2 showed \" nothing.\"  Pt had a barium and xrays.  Pt sa     Abdominal pain, generalized 3/15/2006    1 month of abdominal pain that bryn radiates into her back and chest.  Pt was hospitilzed 2x for the pain at [facility] --pt hopsitized for 3 days.  Rcords not ab=vailable.  She was told either \" twisted intestine or blockage of bowel.  Pt feels it is the hiatal hernia.  Pt hospitilized again a second time  A month ago.  Ct scans x 2 showed \" nothing.\"  Pt had a barium and xrays.  Pt sa     Atherosclerosis of renal artery (H)     Left renal artery stenosis     BENIGN HYPERTENSION 5/1/2006 5/1/2006   Increase catapres to 0.3 mg and decrease clonidine to 0.1 mg daily.  Recheck bp in 1 month.      Benign neoplasm of scalp and skin of neck     Seborrheic keratosis     Cardiac dysrhythmias NEC     Bradycardia, improved on lower dose beta blockers      Cerebral aneurysm, nonruptured     Cerebral Aneurysm     Depressive disorder, not elsewhere classified     Depression     Esophageal reflux     Gastroesophageal Reflux Disease     Female stress incontinence "      Gastrointestinal malfunction arising from mental factors     Dyspepsia     Generalized osteoarthrosis, unspecified site     DJD-chronic back pain     Herpes zoster dermatitis of eyelid      Insomnia, unspecified      Lumbago     Chronic back pain     Other chest pain     Atypical Chest Pain     Rectocele     Grade 3     Unspecified disorder of kidney and ureter     Renal insufficiency     Unspecified essential hypertension      Past Surgical History:   Procedure Laterality Date     CHOLECYSTECTOMY, LAPOROSCOPIC  1997    Cholecystectomy, Laparoscopic     ENDARTERECTOMY CAROTID Right 8/31/2017    Procedure: ENDARTERECTOMY CAROTID;  RIGHT CAROTID ENDARTERECTOMY WITH EEG;  Surgeon: Hilario Parry MD;  Location: [facility] OR     HYSTERECTOMY, RAYMOND  1982    oophorectomy,RAYMOND     SURGICAL HISTORY OF -       Laminectomy x 3     SURGICAL HISTORY OF -       MCA Aneurysm repair     SURGICAL HISTORY OF -   1996    Bladder suspension (Bladder Repair x2)     SURGICAL HISTORY OF -       Bilateral Hand Surgeries for Arthritis x2     SURGICAL HISTORY OF -       Repair of a Cerebral Aneurysm         Current Outpatient Prescriptions on File Prior to Visit:  PANTOPRAZOLE SODIUM PO Take 40 mg by mouth every morning (before breakfast)   OXYCODONE HCL PO Take 2.5-5 mg by mouth every 3 hours as needed   potassium chloride (POTASSIUM CHLORIDE) 10 MEQ tablet Take 10 mEq by mouth daily   fluticasone-vilanterol (BREO ELLIPTA) 100-25 MCG/INH oral inhaler Inhale 1 puff into the lungs daily   triamcinolone (KENALOG) 0.1 % cream Apply topically 3 times daily   calcium-vitamin D (CALTRATE) 600-400 MG-UNIT per tablet Take 1 tablet by mouth daily   diphenhydrAMINE (BENADRYL) 2 % cream Apply topically 3 times daily as needed for itching    isradipine (DYNACIRC) 5 MG capsule Take 5 mg by mouth 2 times daily   bisacodyl (DULCOLAX) 10 MG Suppository Place 10 mg rectally daily as needed for constipation   clopidogrel (PLAVIX) 75 MG tablet Take 1 tablet  (75 mg) by mouth daily   atorvastatin (LIPITOR) 40 MG tablet Take 1 tablet (40 mg) by mouth daily   isosorbide mononitrate (IMDUR) 30 MG 24 hr tablet Take 30 mg by mouth daily   Lactobacillus (ACIDOPHILUS PO) Take 1 capsule by mouth 2 times daily   Lidocaine-Menthol (ICY HOT LIDOCAINE PLUS MENTHOL EX) Externally apply topically 3 times daily Apply to hips   Benzocaine-Glycerin-DM (CEPACOL DUAL RELIEF PO) Take 1 Dose by mouth every 2 hours as needed (sore throat)    Docusate Sodium (COLACE PO) Take 100 mg by mouth daily   Memantine HCl (NAMENDA PO) Take 5 mg by mouth daily   nystatin (MYCOSTATIN) 098784 UNIT/GM POWD Apply topically 2 times daily as needed    MAGNESIUM OXIDE PO Take 250 mg by mouth daily   tiotropium (SPIRIVA) 18 MCG capsule Inhale 18 mcg into the lungs daily   Acetaminophen (TYLENOL PO) Take 1,000 mg by mouth 3 times daily (Takes 2 x 500 mg = 1000 mg)   Furosemide (LASIX PO) Take 20 mg by mouth 2 times daily    Citalopram Hydrobromide (CELEXA PO) Take 20 mg by mouth daily    LISINOPRIL PO Take 30 mg by mouth daily    Gabapentin (NEURONTIN PO) Take 300 mg by mouth 2 times daily    cyanocobalamin (VITAMIN  B-12) 1000 MCG tablet Take 1,000 mcg by mouth daily     No current facility-administered medications on file prior to visit.   Allergies   Allergen Reactions     Accupril      Ace Inhibitors Unknown     Accupril     Augmentin Unknown     Levofloxacin Unknown     Morphine Other (See Comments)     hallucinations     Norvasc [Amlodipine Besylate]      Leg swelling          Problem (# of Occurrences) Relation (Name,Age of Onset)    Asthma (1) Son (Lawerence)    CANCER (2) Mother: stomache, Brother: lymphoma    CEREBROVASCULAR DISEASE (1) Father    DIABETES (1) Son (adonay)    Eye Disorder (1) Son (Lawerence)    GASTROINTESTINAL DISEASE (1) Son (Chi): no control over bladder or bowels    HEART DISEASE (1) Father       Negative family history of: C.A.D., Hypertension, Breast Cancer, Cancer - colorectal,  Prostate Cancer        Social History   Substance Use Topics     Smoking status: Former Smoker     Quit date: 1/1/1992     Smokeless tobacco: Not on file     Alcohol use Yes      Comment: wine occas.       REVIEW OF SYSTEMS:                                                      10-point review of systems is negative except as mentioned above in HPI.     EXAM:                                                      Physical Exam:   Vitals: /59 (BP Location: Right arm, Patient Position: Chair, Cuff Size: Adult Regular)  Pulse 62  SpO2 100%  BMI= There is no height or weight on file to calculate BMI.  GENERAL: NAD.   Neurologic:  MENTAL STATUS: Alert, attentive. Speech is fluent. Normal comprehension. Normal concentration. Adequate fund of knowledge.   CRANIAL NERVES: Visual fields intact to confrontation. Pupils equally, round and reactive to light. Facial sensation and movement normal. EOM full. Slight Esotropia - Left. Hearing intact to conversation. rapezius strength intact. Palate moves symmetrically. Tongue midline.  MOTOR: Mild weakness with Left wrist extension, knee extension and flexion and flexion/extension around the ankle. Otherwise strength is 5/5 in proximal and distal muscle groups of upper and lower extremities. Tone and bulk normal.   DTRs; 1+, symmetric. Babinski down-going bilaterally.   SENSATION: Normal light touch and pinprick. Intact proprioception. Vibration: Decreased at both ankles.   COORDINATION: Normal finger nose finger.   STATION AND GAIT: Gait is wide and cautious. She reports trouble lifting the Left foot but appears normal.   CV: RRR. S1, S2.   NECK: No bruits.    Relevant Data:  MRI Brain (7.26.17):  IMPRESSION:    1. No evidence of acute infarct, acute hemorrhage or mass.  2. Brain atrophy and white matter changes consistent with sequelae of  small vessel ischemic disease.  3. Old lacunar infarct in the right corona radiata.  4. Old microhemorrhage in the right cerebellar  hemisphere.  5. 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    Head MRA (7.26.17):  IMPRESSION:  1. Minimal increase in size of the small saccular aneurysm projecting  superiorly off the left middle cerebral artery M1 segment, now 2.5 mm  diameter.  2. 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  3. No evidence of arterial occlusion elsewhere.    Carotid US (7.27.17):  IMPRESSION:    1. 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  2. Less than 50% stenosis left carotid bifurcation.  3. 2.3 x 2.4 x 3.2 cm left-sided thyroid nodule.      ASSESSMENT and PLAN:                                                      Assessment and Plan:     ICD-10-CM    1. History of TIA (transient ischemic attack) and stroke Z86.73 MR Brain w/o & w Contrast   2. Status post carotid surgery Z98.890 MR Brain w/o & w Contrast        Ms. Clifton is a pleasant 83 yo w with recent presentation for Left facial weakness sent to neurology to assess the carotid stenosis found on ultrasound. The patient has already seen vascular in the interim and had this addressed although there is some confusion as to the plan for antiplatelet therapy going forward. Will defer this determination to vascular surgery. The patient also already has a provider to monitor her aneurysm. It is not clear to me whether the Left-sided weakness is old or new. I explained to the patient that it may be related to the old stroke seen on MRI but given the worsening over the past month, I think that we should re-image. Will send the patient back to her primary care provider and neurosurgeon for further follow-up, to cut down on the number of doctors involved in her care. The patient understands and agrees with the plan.     Patient Instructions:  Follow-up with Dr. Parry about plan for antiplatelet therapy, to make sure you are on appropriate treatment.   Repeat brain MRI. We will notify  you of the results, but I also recommend you follow-up with Dr. Ibarra with regards to this and the recent events.  Follow-up with primary care provider for additional stroke risk management.    Total Time: 45 minutes were spent with the patient. More than 50% of the time spent on counseling (as described above in Assessment and Plan) /coordinating the care.    Heather Whitaker MD  Neurology

## 2017-09-28 NOTE — PATIENT INSTRUCTIONS
Plan:    Follow-up with Dr. Parry about plan for antiplatelet therapy, to make sure you are on appropriate treatment.   Repeat brain MRI. We will notify you of the results, but I also recommend you follow-up with Dr. Ibarra with regards to this and the recent events.  Follow-up with primary care provider for additional stroke risk management.

## 2017-09-28 NOTE — NURSING NOTE
"Chief Complaint   Patient presents with     New Patient     CVA and facial droop.  Referral by Kenton Blackwood MD.       Initial /59 (BP Location: Right arm, Patient Position: Chair, Cuff Size: Adult Regular)  Pulse 62  SpO2 100% Estimated body mass index is 34.01 kg/(m^2) as calculated from the following:    Height as of 8/31/17: 5' 3\" (1.6 m).    Weight as of 9/19/17: 192 lb (87.1 kg).  Medication Reconciliation: complete    Patient prefers to be contacted: [phone]  Okay to leave detailed message on voicemail: yes    Kelley Hurtado NR-CMA    "

## 2017-09-28 NOTE — MR AVS SNAPSHOT
After Visit Summary   9/28/2017    Jazmin Clifton    MRN: [id number]           Patient Information     Date Of Birth          12/30/1932        Visit Information        Provider Department      9/28/2017 12:45 PM Heather Whitaker MD [facility]        Today's Diagnoses     History of TIA (transient ischemic attack) and stroke    -  1    Status post carotid surgery          Care Instructions    Plan:    Follow-up with Dr. Parry about plan for antiplatelet therapy, to make sure you are on appropriate treatment.   Repeat brain MRI. We will notify you of the results, but I also recommend you follow-up with Dr. Ibarra with regards to this and the recent events.  Follow-up with primary care provider for additional stroke risk management.              Follow-ups after your visit        Follow-up notes from your care team     Return if symptoms worsen or fail to improve.      Future tests that were ordered for you today     Open Future Orders        Priority Expected Expires Ordered    MR Brain w/o & w Contrast Routine  9/28/2018 9/28/2017            Who to contact     If you have questions or need follow up information about today's clinic visit or your schedule please contact [facility] directly at [phone].  Normal or non-critical lab and imaging results will be communicated to you by Team My Mobilehart, letter or phone within 4 business days after the clinic has received the results. If you do not hear from us within 7 days, please contact the clinic through SCLt or phone. If you have a critical or abnormal lab result, we will notify you by phone as soon as possible.  Submit refill requests through Appuri or call your pharmacy and they will forward the refill request to us. Please allow 3 business days for your refill to be completed.          Additional Information About Your Visit        Team My Mobilehart Information     Appuri lets you send messages to your doctor, view your test  "results, renew your prescriptions, schedule appointments and more. To sign up, go to [email or web address] . Click on \"Log in\" on the left side of the screen, which will take you to the Welcome page. Then click on \"Sign up Now\" on the right side of the page.     You will be asked to enter the access code listed below, as well as some personal information. Please follow the directions to create your username and password.     Your access code is: [email or web address]  Expires: 10/26/2017  1:54 PM     Your access code will  in 90 days. If you need help or a new code, please call your [facility] clinic or [phone].        Care EveryWhere ID     This is your Care EveryWhere ID. This could be used by other organizations to access your [facility] medical records  [id number]        Your Vitals Were     Pulse Pulse Oximetry                62 100%           Blood Pressure from Last 3 Encounters:   17 120/59   17 137/67   17 127/68    Weight from Last 3 Encounters:   17 192 lb (87.1 kg)   17 191 lb (86.6 kg)   17 189 lb 9.5 oz (86 kg)               Primary Care Provider Office Phone # Fax #    Mirian Aguilera [phone]       [facility] [id number] [place] [id number]        Equal Access to Services     AISSATOU MCADAMS AH: Hadii aad ku hadasho Soomaali, waaxda luqadaha, qaybta kaalmada adeegyada, andrew jameson. So [facility] [phone].    ATENCIÓN: Si habla español, tiene a ortega disposición servicios gratuitos de asistencia lingüística. Amira mei [phone].    We comply with applicable federal civil rights laws and [place] laws. We do not discriminate on the basis of race, color, national origin, age, disability sex, sexual orientation or gender identity.            Thank you!     Thank you for choosing [facility]  for your care. Our goal is always to provide you with excellent care. Hearing back from our " patients is one way we can continue to improve our services. Please take a few minutes to complete the written survey that you may receive in the mail after your visit with us. Thank you!             Your Updated Medication List - Protect others around you: Learn how to safely use, store and throw away your medicines at www.disposemymeds.org.          This list is accurate as of: 9/28/17  1:34 PM.  Always use your most recent med list.                   Brand Name Dispense Instructions for use Diagnosis    ACIDOPHILUS PO      Take 1 capsule by mouth 2 times daily        atorvastatin 40 MG tablet    LIPITOR    30 tablet    Take 1 tablet (40 mg) by mouth daily    Stenosis of right carotid artery       bisacodyl 10 MG Suppository    DULCOLAX     Place 10 mg rectally daily as needed for constipation        BREO ELLIPTA 100-25 MCG/INH oral inhaler   Generic drug:  fluticasone-vilanterol      Inhale 1 puff into the lungs daily        calcium-vitamin D 600-400 MG-UNIT per tablet    CALTRATE     Take 1 tablet by mouth daily        CELEXA PO      Take 20 mg by mouth daily        CEPACOL DUAL RELIEF PO      Take 1 Dose by mouth every 2 hours as needed (sore throat)        clopidogrel 75 MG tablet    PLAVIX    30 tablet    Take 1 tablet (75 mg) by mouth daily    Facial droop       COLACE PO      Take 100 mg by mouth daily        cyanocobalamin 1000 MCG tablet    vitamin  B-12     Take 1,000 mcg by mouth daily        diphenhydrAMINE 2 % cream    BENADRYL     Apply topically 3 times daily as needed for itching        ICY HOT LIDOCAINE PLUS MENTHOL EX      Externally apply topically 3 times daily Apply to hips        isosorbide mononitrate 30 MG 24 hr tablet    IMDUR     Take 30 mg by mouth daily        isradipine 5 MG capsule    DYNACIRC     Take 5 mg by mouth 2 times daily        KLOR-CON 10 MEQ tablet   Generic drug:  potassium chloride      Take 10 mEq by mouth daily        LASIX PO      Take 20 mg by mouth 2 times daily         LISINOPRIL PO      Take 30 mg by mouth daily        MAGNESIUM OXIDE PO      Take 250 mg by mouth daily        NAMENDA PO      Take 5 mg by mouth daily        NEURONTIN PO      Take 300 mg by mouth 2 times daily        nystatin 080421 UNIT/GM Powd    MYCOSTATIN     Apply topically 2 times daily as needed        OXYCODONE HCL PO      Take 2.5-5 mg by mouth every 3 hours as needed        PANTOPRAZOLE SODIUM PO      Take 40 mg by mouth every morning (before breakfast)        tiotropium 18 MCG capsule    SPIRIVA     Inhale 18 mcg into the lungs daily        triamcinolone 0.1 % cream    KENALOG     Apply topically 3 times daily        TYLENOL PO      Take 1,000 mg by mouth 3 times daily (Takes 2 x 500 mg = 1000 mg)

## 2017-09-28 NOTE — TELEPHONE ENCOUNTER
Received call from Sheryl at [facility].  They will need to clarify with Dr. Parry and will let us know when they hear from him.

## 2017-09-28 NOTE — TELEPHONE ENCOUNTER
Ms. Clifton was seen by Dr. Whitaker in clinic today.  There was some confusion around what she should be taking now for antiplatelet therapy.  I've left a message for Dr. Parry's nurse for clarification.  Patient is currently on Plavix.  Should she also be on aspirin?

## 2017-10-03 RX ORDER — ASPIRIN 81 MG/1
81 TABLET, CHEWABLE ORAL
COMMUNITY
End: 2019-04-09

## 2017-10-03 NOTE — TELEPHONE ENCOUNTER
Arely, I checked with Dr. Whitaker about the order to discontinue the Plavix and start the 81 mg ASA.  She said she'd prefer the order come from your office (I'm unable to do a verbal order, given my scope of practice).  She said that Ms. Clifton was also supposed to follow up with Dr. Parry, which the patient and her caregivers apparently didn't know.  So Dr. Whitaker felt that it might be good to have your office reach out to the nursing home with this reminder, as well.      She is a resident of [facility] in [place].  The nursing staff phone is [phone].  The fax for them is [phone].  I'm sorry to give this back to you.  Hopefully, now that she's settled in this care center, her orders will be more settled.

## 2017-10-03 NOTE — TELEPHONE ENCOUNTER
Left message for Sheryl to return my call or otherwise contact me regarding Ms. Clifton's antiplatelet plan.

## 2017-10-03 NOTE — TELEPHONE ENCOUNTER
I heard back from Dr. Brinda Mcgee's nurse.  She said Dr. Parry would like Ms. Clifton to discontinue the Plavix and start an 81 mg aspirin.  Are you okay with me advising Ms. Clifton's caregivers of this?  They will likely need a written order.

## 2017-10-03 NOTE — TELEPHONE ENCOUNTER
Spoke with nurse at [facility] in [place].  Informed nurse of plan to see pt in December for carotid f/u.  Also, gave verbal order to discontinue pt's plavix and start daily baby asa instead.  Nurse states that she needs written order.  Will have  sign an order tomorrow when he is in clinic and fax to [facility].    Arely Schumacher, ADRIANAN, RN

## 2017-10-05 ENCOUNTER — HOSPITAL ENCOUNTER (OUTPATIENT)
Dept: MRI IMAGING | Facility: CLINIC | Age: 82
Discharge: HOME | End: 2017-10-05
Attending: PSYCHIATRY & NEUROLOGY | Admitting: PSYCHIATRY & NEUROLOGY
Payer: MEDICARE

## 2017-10-05 DIAGNOSIS — Z98.890 STATUS POST CAROTID SURGERY: ICD-10-CM

## 2017-10-05 DIAGNOSIS — Z86.73 HISTORY OF TIA (TRANSIENT ISCHEMIC ATTACK) AND STROKE: ICD-10-CM

## 2017-10-05 PROCEDURE — 25000128 H RX IP 250 OP 636: Performed by: PSYCHIATRY & NEUROLOGY

## 2017-10-05 PROCEDURE — 70553 MRI BRAIN STEM W/O & W/DYE: CPT

## 2017-10-05 PROCEDURE — A9585 GADOBUTROL INJECTION: HCPCS | Performed by: PSYCHIATRY & NEUROLOGY

## 2017-10-05 RX ORDER — GADOBUTROL 604.72 MG/ML
8 INJECTION INTRAVENOUS ONCE
Status: COMPLETED | OUTPATIENT
Start: 2017-10-05 | End: 2017-10-05

## 2017-10-05 RX ADMIN — GADOBUTROL 8 ML: 604.72 INJECTION INTRAVENOUS at 10:15

## 2017-11-01 NOTE — DISCHARGE SUMMARY
FINAL DIAGNOSIS:  High grade stenosis right internal carotid artery, status post right hemispheric TIA.      PROCEDURE:  Right carotid endarterectomy with Dacron patch angioplasty.      COMPLICATIONS:  None.      HOSPITAL COURSE:  Ms. Jazmin Clifton is an 84-year-old female who was admitted to the hospital with left facial droop as well as some left hand weakness and incoordination.  She had undergone a CT angiogram of her carotid arteries which showed a greater than 70% stenosis right internal carotid artery.  Options were discussed with the patient in detail.  She elected to proceed with right carotid endarterectomy.  This was performed on 2017.  The patient's postoperative course was entirely unremarkable without complication.  She was discharged from the hospital on 2017 back to her nursing home which is [facility].  She will be maintained on her preoperative medications and will be seen by Neurology for followup of her right MCA aneurysm which had previously been clipped.        We will see her in our office in postoperative followup.  She was instructed with regard to wound care, diet and activity.          PRASHANT BOND III, MD             D: 2017 11:28   T: 2017 11:43   MT: ESTUARDO      Name:     JAZMIN CLIFTON   MRN:      [id number]        Account:        [id number]   :      1932           Admit Date:                                       Discharge Date: 2017      Document: [id number]

## 2017-11-27 DIAGNOSIS — K21.9 GASTROESOPHAGEAL REFLUX DISEASE, ESOPHAGITIS PRESENCE NOT SPECIFIED: ICD-10-CM

## 2017-11-27 DIAGNOSIS — I25.118 CORONARY ARTERY DISEASE OF NATIVE HEART WITH STABLE ANGINA PECTORIS, UNSPECIFIED VESSEL OR LESION TYPE (H): Primary | ICD-10-CM

## 2017-11-27 DIAGNOSIS — I65.21 STENOSIS OF RIGHT CAROTID ARTERY: ICD-10-CM

## 2017-11-27 DIAGNOSIS — F02.80 ALZHEIMER'S DEMENTIA WITHOUT BEHAVIORAL DISTURBANCE, UNSPECIFIED TIMING OF DEMENTIA ONSET: ICD-10-CM

## 2017-11-27 DIAGNOSIS — G30.9 ALZHEIMER'S DEMENTIA WITHOUT BEHAVIORAL DISTURBANCE, UNSPECIFIED TIMING OF DEMENTIA ONSET: ICD-10-CM

## 2017-11-27 RX ORDER — LISINOPRIL 30 MG/1
TABLET ORAL
Qty: 30 TABLET | Refills: 3 | Status: SHIPPED | OUTPATIENT
Start: 2017-11-27 | End: 2018-11-21

## 2017-11-27 RX ORDER — PANTOPRAZOLE SODIUM 40 MG/1
TABLET, DELAYED RELEASE ORAL
Qty: 30 TABLET | Refills: 3 | Status: SHIPPED | OUTPATIENT
Start: 2017-11-27 | End: 2018-12-07 | Stop reason: ALTCHOICE

## 2017-11-27 RX ORDER — POTASSIUM CHLORIDE 750 MG/1
TABLET, EXTENDED RELEASE ORAL
Qty: 30 TABLET | Refills: 3 | Status: SHIPPED | OUTPATIENT
Start: 2017-11-27 | End: 2018-05-30

## 2017-11-27 RX ORDER — CITALOPRAM HYDROBROMIDE 20 MG/1
TABLET ORAL
Qty: 30 TABLET | Refills: 3 | Status: SHIPPED | OUTPATIENT
Start: 2017-11-27 | End: 2018-05-30

## 2017-11-27 RX ORDER — ATORVASTATIN CALCIUM 40 MG/1
TABLET, FILM COATED ORAL
Qty: 30 TABLET | Refills: 3 | Status: SHIPPED | OUTPATIENT
Start: 2017-11-27 | End: 2019-01-04

## 2017-11-27 RX ORDER — ISOSORBIDE MONONITRATE 30 MG/1
TABLET, EXTENDED RELEASE ORAL
Qty: 30 TABLET | Refills: 3 | Status: SHIPPED | OUTPATIENT
Start: 2017-11-27 | End: 2019-01-04

## 2017-11-27 RX ORDER — MEMANTINE HYDROCHLORIDE 5 MG/1
TABLET ORAL
Qty: 30 TABLET | Refills: 3 | Status: SHIPPED | OUTPATIENT
Start: 2017-11-27 | End: 2019-01-04

## 2017-12-12 ENCOUNTER — HOSPITAL ENCOUNTER (OUTPATIENT)
Dept: ULTRASOUND IMAGING | Facility: CLINIC | Age: 82
Discharge: HOME | End: 2017-12-12
Attending: SURGERY | Admitting: SURGERY
Payer: COMMERCIAL

## 2017-12-12 ENCOUNTER — OFFICE VISIT (OUTPATIENT)
Dept: VASCULAR SURGERY | Facility: CLINIC | Age: 82
End: 2017-12-12
Payer: COMMERCIAL

## 2017-12-12 VITALS — HEART RATE: 63 BPM | RESPIRATION RATE: 16 BRPM | DIASTOLIC BLOOD PRESSURE: 53 MMHG | SYSTOLIC BLOOD PRESSURE: 114 MMHG

## 2017-12-12 DIAGNOSIS — Z13.6 CARDIOVASCULAR SCREENING; LDL GOAL LESS THAN 100: ICD-10-CM

## 2017-12-12 DIAGNOSIS — I65.29 CAROTID ARTERY STENOSIS: ICD-10-CM

## 2017-12-12 DIAGNOSIS — I65.21 CAROTID ARTERY STENOSIS, SYMPTOMATIC, RIGHT: Primary | ICD-10-CM

## 2017-12-12 PROCEDURE — 93882 EXTRACRANIAL UNI/LTD STUDY: CPT | Mod: RT

## 2017-12-12 PROCEDURE — 99214 OFFICE O/P EST MOD 30 MIN: CPT | Performed by: SURGERY

## 2017-12-12 NOTE — MR AVS SNAPSHOT
"              After Visit Summary   2017    Jazmin Clifton    MRN: [id number]           Patient Information     Date Of Birth          1932        Visit Information        Provider Department      2017 11:00 AM Cayetano Connor MD [facility]        Today's Diagnoses     Carotid artery stenosis, symptomatic, right    -  1    CARDIOVASCULAR SCREENING; LDL GOAL LESS THAN 100           Follow-ups after your visit        Follow-up notes from your care team     Return in about 1 year (around 2018).      Who to contact     If you have questions or need follow up information about today's clinic visit or your schedule please contact [facility] directly at [phone].  Normal or non-critical lab and imaging results will be communicated to you by MyChart, letter or phone within 4 business days after the clinic has received the results. If you do not hear from us within 7 days, please contact the clinic through MyChart or phone. If you have a critical or abnormal lab result, we will notify you by phone as soon as possible.  Submit refill requests through [employer] or call your pharmacy and they will forward the refill request to us. Please allow 3 business days for your refill to be completed.          Additional Information About Your Visit        MyChart Information     [employer] lets you send messages to your doctor, view your test results, renew your prescriptions, schedule appointments and more. To sign up, go to [email or web address] . Click on \"Log in\" on the left side of the screen, which will take you to the Welcome page. Then click on \"Sign up Now\" on the right side of the page.     You will be asked to enter the access code listed below, as well as some personal information. Please follow the directions to create your username and password.     Your access code is: [email or web address]  Expires: 3/12/2018 12:01 PM     Your access code will  in 90 days. If you " need help or a new code, please call your [place] clinic or [phone].        Care EveryWhere ID     This is your Care EveryWhere ID. This could be used by other organizations to access your [place] medical records  [id number]        Your Vitals Were     Pulse Respirations                63 16           Blood Pressure from Last 3 Encounters:   12/12/17 114/53   09/28/17 120/59   09/19/17 137/67    Weight from Last 3 Encounters:   09/19/17 87.1 kg (192 lb)   09/11/17 86.6 kg (191 lb)   09/01/17 86 kg (189 lb 9.5 oz)              Today, you had the following     No orders found for display       Primary Care Provider Office Phone # Fax #    Kim MARY Hidalgo [phone]       [address] [facility] [address]  [facility] [address]        Equal Access to Services     [facility]: Hadii aad ku hadasho Soomaali, waaxda luqadaha, qaybta kaalmada adeegyada, waxay idiin hayaan adeyordan kharaleanna reed . So [facility] [phone].    ATENCIÓN: Si habla español, tiene a ortega disposición servicios gratuitos de asistencia lingüística. Amira al [phone].    We comply with applicable federal civil rights laws and [place] laws. We do not discriminate on the basis of race, color, national origin, age, disability, sex, sexual orientation, or gender identity.            Thank you!     Thank you for choosing [facility]  for your care. Our goal is always to provide you with excellent care. Hearing back from our patients is one way we can continue to improve our services. Please take a few minutes to complete the written survey that you may receive in the mail after your visit with us. Thank you!             Your Updated Medication List - Protect others around you: Learn how to safely use, store and throw away your medicines at www.disposemymeds.org.          This list is accurate as of: 12/12/17 12:01 PM.  Always use your most recent med list.                   Brand Name Dispense Instructions for use Diagnosis     ACIDOPHILUS PO      Take 1 capsule by mouth 2 times daily        aspirin 81 MG chewable tablet      Take 81 mg by mouth        atorvastatin 40 MG tablet    LIPITOR    30 tablet    TAKE 1 TAB BY MOUTH AT BEDTIME FOR HIGH CHOLESTEROL    Stenosis of right carotid artery       bisacodyl 10 MG Suppository    DULCOLAX     Place 10 mg rectally daily as needed for constipation        BREO ELLIPTA 100-25 MCG/INH oral inhaler   Generic drug:  fluticasone-vilanterol      Inhale 1 puff into the lungs daily        calcium-vitamin D 600-400 MG-UNIT per tablet    CALTRATE     Take 1 tablet by mouth daily        CEPACOL DUAL RELIEF PO      Take 1 Dose by mouth every 2 hours as needed (sore throat)        citalopram 20 MG tablet    celeXA    30 tablet    TAKE 1 TAB BY MOUTH ONCE DAILY FOR DEPRESSION    Alzheimer's dementia without behavioral disturbance, unspecified timing of dementia onset       COLACE PO      Take 100 mg by mouth daily        cyanocobalamin 1000 MCG tablet    vitamin  B-12     Take 1,000 mcg by mouth daily        diphenhydrAMINE 2 % cream    BENADRYL     Apply topically 3 times daily as needed for itching        ICY HOT LIDOCAINE PLUS MENTHOL EX      Externally apply topically 3 times daily as needed Apply to hips        isosorbide mononitrate 30 MG 24 hr tablet    IMDUR    30 tablet    TAKE 1 TAB BY MOUTH ONCE DAILY FOR HYPERTENSION/HIGH BLOOD PRESSURE    Coronary artery disease of native heart with stable angina pectoris, unspecified vessel or lesion type (H)       isradipine 5 MG capsule    DYNACIRC     Take 5 mg by mouth 2 times daily        KLOR-CON 10 MEQ tablet   Generic drug:  potassium chloride      Take 10 mEq by mouth daily        LASIX PO      Take 20 mg by mouth 2 times daily        lisinopril 30 MG tablet    PRINIVIL,ZESTRIL    30 tablet    TAKE 1 TAB BY MOUTH ONCE DAILY FOR HYPERTENSION/HIGH BLOOD PRESSURE    Coronary artery disease of native heart with stable angina pectoris, unspecified vessel or  lesion type (H)       MAGNESIUM OXIDE PO      Take 250 mg by mouth daily        memantine 5 MG tablet    NAMENDA    30 tablet    TAKE 1 TAB BY MOUTH ONCE DAILY FOR DEMENTIA    Alzheimer's dementia without behavioral disturbance, unspecified timing of dementia onset       NEURONTIN PO      Take 300 mg by mouth 2 times daily        nystatin 284568 UNIT/GM Powd    MYCOSTATIN     Apply topically 2 times daily as needed        OXYCODONE HCL PO      Take 2.5-5 mg by mouth every 3 hours as needed        pantoprazole 40 MG EC tablet    PROTONIX    30 tablet    TAKE 1 TAB BY MOUTH ONCE DAILY BEFORE BREAKFAST FOR GERD    Gastroesophageal reflux disease, esophagitis presence not specified       potassium chloride SA 10 MEQ CR tablet    K-DUR/KLOR-CON M    30 tablet    TAKE 1 TAB BY MOUTH ONCE DAILY FOR SUPPLEMENT/LOW POTASSIUM    Coronary artery disease of native heart with stable angina pectoris, unspecified vessel or lesion type (H)       tiotropium 18 MCG capsule    SPIRIVA     Inhale 18 mcg into the lungs daily        triamcinolone 0.1 % cream    KENALOG     Apply topically 3 times daily        TYLENOL PO      Take 1,000 mg by mouth 3 times daily (Takes 2 x 500 mg = 1000 mg)

## 2017-12-12 NOTE — NURSING NOTE
"Chief Complaint   Patient presents with     RECHECK       Initial /53 (BP Location: Right arm, Patient Position: Chair, Cuff Size: Adult Regular)  Pulse 63  Resp 16 Estimated body mass index is 34.01 kg/(m^2) as calculated from the following:    Height as of 8/31/17: 1.6 m (5' 3\").    Weight as of 9/19/17: 87.1 kg (192 lb).  Medication Reconciliation: complete.   montserrat gramajo LPN      "

## 2017-12-12 NOTE — PROGRESS NOTES
"Vascular Surgery Clinic Note    HPI:  Ms. Clifton is a 85 yo female who underwent right CEA in September with Dr. Parry for symptomatic carotid stenosis.   She had some swallowing issues and these have mostly resolved.  Some foods she still avoids for fear of difficulty swallowing.  Today she denies stroke/TIA symptoms and is doing very well.  She is on a statin medication, but has stopped her ASA 81 mg po daily because of chronic anemia.  I've recommended that she continue this ASA 81 mg po daily life-long.  We have reviewed her carotid duplex results today in clinic.      Allergies   Allergen Reactions     Accupril      Ace Inhibitors Unknown     Accupril     Augmentin Unknown     Levofloxacin Unknown     Morphine Other (See Comments)     hallucinations     Norvasc [Amlodipine Besylate]      Leg swelling       Past Medical History:   Diagnosis Date     ABDOMINAL PAIN GENERALIZED 3/15/2006    1 month of abdominal pain that bryn radiates into her back and chest.  Pt was hospitilzed 2x for the pain at [facility] --pt hopsitized for 3 days.  Rcords not ab=vailable.  She was told either \" twisted intestine or blockage of bowel.  Pt feels it is the hiatal hernia.  Pt hospitilized again a second time  A month ago.  Ct scans x 2 showed \" nothing.\"  Pt had a barium and xrays.  Pt sa     Abdominal pain, generalized 3/15/2006    1 month of abdominal pain that bryn radiates into her back and chest.  Pt was hospitilzed 2x for the pain at [facility] --pt hopsitized for 3 days.  Rcords not ab=vailable.  She was told either \" twisted intestine or blockage of bowel.  Pt feels it is the hiatal hernia.  Pt hospitilized again a second time  A month ago.  Ct scans x 2 showed \" nothing.\"  Pt had a barium and xrays.  Pt sa     Atherosclerosis of renal artery (H)     Left renal artery stenosis     BENIGN HYPERTENSION 5/1/2006 5/1/2006   Increase catapres to 0.3 mg and decrease clonidine to 0.1 mg daily.  Recheck " bp in 1 month.      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     Dyspepsia     Generalized osteoarthrosis, unspecified site     DJD-chronic back pain     Herpes zoster dermatitis of eyelid      Insomnia, unspecified      Lumbago     Chronic back pain     Other chest pain     Atypical Chest Pain     Other specified cardiac dysrhythmias(427.89)     Bradycardia, improved on lower dose beta blockers      Rectocele     Grade 3     Unspecified disorder of kidney and ureter     Renal insufficiency     Unspecified essential hypertension      Physical Examination:   /53 (BP Location: Right arm, Patient Position: Chair, Cuff Size: Adult Regular)  Pulse 63  Resp 16  Constitutional:  NAD, well developed female, in a wheel chair at today's visit.  HEENT:  KATELIN, CN II-XII grossly intact, she does express still some swallowing issues, but these are mild.  Neck is soft, supple, incision well healed with good scar tissue.  Ext: warm and well perfused.  Neuro:  In a wheel chair,  She denies numbness tingling or new onset weakness.     Assessment:  83 yo female s/p right CEA for symptomatic stenosis.      Plan:   I plan to see Ms. Clifton back in 1 year with bilateral carotid duplex.  I've asked that she continue her Statin medication and the ASA 81 mg po daily for life.   Her left carotid artery has no evidence of stenosis on review of her duplex from 1 year ago.  She is doing great s/p surgery.  I have spent 25 mins with the patient with > 50% of the time spent discussing and coordinating care for the patient.  Thank you for allowing me to participate in the care of your patient.    Cayetano Connor MD  Vascular Surgery

## 2018-01-05 ENCOUNTER — RECORDS - HEALTHEAST (OUTPATIENT)
Dept: LAB | Facility: CLINIC | Age: 83
End: 2018-01-05

## 2018-01-05 LAB
ALBUMIN UR-MCNC: NEGATIVE MG/DL
APPEARANCE UR: CLEAR
BACTERIA #/AREA URNS HPF: ABNORMAL HPF
BILIRUB UR QL STRIP: NEGATIVE
COLOR UR AUTO: ABNORMAL
GLUCOSE UR STRIP-MCNC: NEGATIVE MG/DL
HGB UR QL STRIP: ABNORMAL
HYALINE CASTS #/AREA URNS LPF: ABNORMAL LPF
KETONES UR STRIP-MCNC: NEGATIVE MG/DL
LEUKOCYTE ESTERASE UR QL STRIP: ABNORMAL
MUCOUS THREADS #/AREA URNS LPF: ABNORMAL LPF
NITRATE UR QL: POSITIVE
PH UR STRIP: 5.5 [PH] (ref 4.5–8)
RBC #/AREA URNS AUTO: ABNORMAL HPF
SP GR UR STRIP: 1.01 (ref 1–1.03)
SQUAMOUS #/AREA URNS AUTO: ABNORMAL LPF
UROBILINOGEN UR STRIP-ACNC: ABNORMAL
WBC #/AREA URNS AUTO: ABNORMAL HPF
WBC CLUMPS #/AREA URNS HPF: PRESENT /[HPF]

## 2018-01-07 LAB — BACTERIA SPEC CULT: ABNORMAL

## 2018-01-09 ENCOUNTER — RECORDS - HEALTHEAST (OUTPATIENT)
Dept: LAB | Facility: CLINIC | Age: 83
End: 2018-01-09

## 2018-01-09 LAB
ALBUMIN UR-MCNC: NEGATIVE MG/DL
APPEARANCE UR: ABNORMAL
BACTERIA #/AREA URNS HPF: ABNORMAL HPF
BILIRUB UR QL STRIP: NEGATIVE
COLOR UR AUTO: YELLOW
GLUCOSE UR STRIP-MCNC: NEGATIVE MG/DL
HGB UR QL STRIP: NEGATIVE
HYALINE CASTS #/AREA URNS LPF: ABNORMAL LPF
KETONES UR STRIP-MCNC: NEGATIVE MG/DL
LEUKOCYTE ESTERASE UR QL STRIP: ABNORMAL
MUCOUS THREADS #/AREA URNS LPF: ABNORMAL LPF
NITRATE UR QL: POSITIVE
PH UR STRIP: 5.5 [PH] (ref 4.5–8)
RBC #/AREA URNS AUTO: ABNORMAL HPF
SP GR UR STRIP: 1.01 (ref 1–1.03)
SQUAMOUS #/AREA URNS AUTO: ABNORMAL LPF
UROBILINOGEN UR STRIP-ACNC: ABNORMAL
WBC #/AREA URNS AUTO: ABNORMAL HPF
WBC CLUMPS #/AREA URNS HPF: PRESENT /[HPF]

## 2018-01-11 LAB — BACTERIA SPEC CULT: ABNORMAL

## 2018-01-15 ENCOUNTER — RECORDS - HEALTHEAST (OUTPATIENT)
Dept: ADMINISTRATIVE | Facility: OTHER | Age: 83
End: 2018-01-15

## 2018-02-07 ENCOUNTER — RECORDS - HEALTHEAST (OUTPATIENT)
Dept: LAB | Facility: CLINIC | Age: 83
End: 2018-02-07

## 2018-02-08 LAB
ANION GAP SERPL CALCULATED.3IONS-SCNC: 7 MMOL/L (ref 5–18)
BUN SERPL-MCNC: 40 MG/DL (ref 8–28)
CALCIUM SERPL-MCNC: 9.9 MG/DL (ref 8.5–10.5)
CHLORIDE BLD-SCNC: 108 MMOL/L (ref 98–107)
CO2 SERPL-SCNC: 28 MMOL/L (ref 22–31)
CREAT SERPL-MCNC: 1.05 MG/DL (ref 0.6–1.1)
ERYTHROCYTE [DISTWIDTH] IN BLOOD BY AUTOMATED COUNT: 15.4 % (ref 11–14.5)
GFR SERPL CREATININE-BSD FRML MDRD: 50 ML/MIN/1.73M2
GLUCOSE BLD-MCNC: 106 MG/DL (ref 70–125)
HCT VFR BLD AUTO: 35 % (ref 35–47)
HGB BLD-MCNC: 10.6 G/DL (ref 12–16)
MCH RBC QN AUTO: 26.8 PG (ref 27–34)
MCHC RBC AUTO-ENTMCNC: 30.3 G/DL (ref 32–36)
MCV RBC AUTO: 89 FL (ref 80–100)
PLATELET # BLD AUTO: 159 THOU/UL (ref 140–440)
PMV BLD AUTO: 11.5 FL (ref 8.5–12.5)
POTASSIUM BLD-SCNC: 4.3 MMOL/L (ref 3.5–5)
RBC # BLD AUTO: 3.95 MILL/UL (ref 3.8–5.4)
SODIUM SERPL-SCNC: 143 MMOL/L (ref 136–145)
TSH SERPL DL<=0.005 MIU/L-ACNC: 0.63 UIU/ML (ref 0.3–5)
WBC: 5.6 THOU/UL (ref 4–11)

## 2018-02-26 ENCOUNTER — RECORDS - HEALTHEAST (OUTPATIENT)
Dept: ADMINISTRATIVE | Facility: OTHER | Age: 83
End: 2018-02-26

## 2018-04-02 ENCOUNTER — COMMUNICATION - HEALTHEAST (OUTPATIENT)
Dept: RESPIRATORY THERAPY | Facility: HOSPITAL | Age: 83
End: 2018-04-02

## 2018-04-02 ENCOUNTER — RECORDS - HEALTHEAST (OUTPATIENT)
Dept: ADMINISTRATIVE | Facility: OTHER | Age: 83
End: 2018-04-02

## 2018-04-06 ENCOUNTER — COMMUNICATION - HEALTHEAST (OUTPATIENT)
Dept: RESPIRATORY THERAPY | Facility: HOSPITAL | Age: 83
End: 2018-04-06

## 2018-04-26 ENCOUNTER — AMBULATORY - HEALTHEAST (OUTPATIENT)
Dept: SURGERY | Facility: CLINIC | Age: 83
End: 2018-04-26

## 2018-04-26 DIAGNOSIS — R19.09 MASS OF RIGHT INGUINAL REGION: ICD-10-CM

## 2018-04-27 ENCOUNTER — COMMUNICATION - HEALTHEAST (OUTPATIENT)
Dept: RESPIRATORY THERAPY | Facility: HOSPITAL | Age: 83
End: 2018-04-27

## 2018-04-30 ENCOUNTER — OFFICE VISIT - HEALTHEAST (OUTPATIENT)
Dept: SURGERY | Facility: CLINIC | Age: 83
End: 2018-04-30

## 2018-04-30 DIAGNOSIS — R19.09 RIGHT GROIN MASS: ICD-10-CM

## 2018-04-30 ASSESSMENT — MIFFLIN-ST. JEOR: SCORE: 1310.86

## 2018-05-02 ENCOUNTER — RECORDS - HEALTHEAST (OUTPATIENT)
Dept: LAB | Facility: CLINIC | Age: 83
End: 2018-05-02

## 2018-05-02 ENCOUNTER — ANESTHESIA - HEALTHEAST (OUTPATIENT)
Dept: SURGERY | Facility: HOSPITAL | Age: 83
End: 2018-05-02

## 2018-05-02 LAB
ANION GAP SERPL CALCULATED.3IONS-SCNC: 7 MMOL/L (ref 5–18)
BUN SERPL-MCNC: 33 MG/DL (ref 8–28)
CALCIUM SERPL-MCNC: 9.4 MG/DL (ref 8.5–10.5)
CHLORIDE BLD-SCNC: 107 MMOL/L (ref 98–107)
CO2 SERPL-SCNC: 27 MMOL/L (ref 22–31)
CREAT SERPL-MCNC: 1.47 MG/DL (ref 0.6–1.1)
ERYTHROCYTE [DISTWIDTH] IN BLOOD BY AUTOMATED COUNT: 16.3 % (ref 11–14.5)
GFR SERPL CREATININE-BSD FRML MDRD: 34 ML/MIN/1.73M2
GLUCOSE BLD-MCNC: 125 MG/DL (ref 70–125)
HCT VFR BLD AUTO: 31.3 % (ref 35–47)
HGB BLD-MCNC: 9.2 G/DL (ref 12–16)
MCH RBC QN AUTO: 25.8 PG (ref 27–34)
MCHC RBC AUTO-ENTMCNC: 29.4 G/DL (ref 32–36)
MCV RBC AUTO: 88 FL (ref 80–100)
PLATELET # BLD AUTO: 182 THOU/UL (ref 140–440)
PMV BLD AUTO: 11.6 FL (ref 8.5–12.5)
POTASSIUM BLD-SCNC: 4.3 MMOL/L (ref 3.5–5)
RBC # BLD AUTO: 3.56 MILL/UL (ref 3.8–5.4)
SODIUM SERPL-SCNC: 141 MMOL/L (ref 136–145)
WBC: 5.5 THOU/UL (ref 4–11)

## 2018-05-02 ASSESSMENT — MIFFLIN-ST. JEOR: SCORE: 1315.4

## 2018-05-03 ENCOUNTER — SURGERY - HEALTHEAST (OUTPATIENT)
Dept: SURGERY | Facility: HOSPITAL | Age: 83
End: 2018-05-03

## 2018-05-11 ENCOUNTER — COMMUNICATION - HEALTHEAST (OUTPATIENT)
Dept: SCHEDULING | Facility: CLINIC | Age: 83
End: 2018-05-11

## 2018-05-18 ENCOUNTER — OFFICE VISIT - HEALTHEAST (OUTPATIENT)
Dept: SURGERY | Facility: CLINIC | Age: 83
End: 2018-05-18

## 2018-05-18 DIAGNOSIS — M79.89 RIGHT LEG SWELLING: ICD-10-CM

## 2018-05-22 NOTE — CONSULTS
Care Transition Initial Assessment - RN      Met with: Patient.    DATA   Active Problems:    CVA (cerebral vascular accident) (H)       Primary Care Clinic Name:  Geriatrics     Contact information and PCP information verified: Yes      ASSESSMENT  Cognitive Status: awake, alert and oriented.             Lives With: facility resident  Living Arrangements: extended care facility  Quality Of Family Relationships: unable to assess              Insurance Concerns: No Insurance issues identified          This writer met with pt, introduced self and role.           Discharge Planner   Discharge Plans in progress: patient has bed hold at [facility]  Barriers to discharge plan: none  Follow up plan: CTS to follow through hospitalization.       Entered by: Anjelica Valiente 07/27/2017 5:14 PM         nAjelica Valiente RN, Care Coordinator [phone]     no

## 2018-05-30 ENCOUNTER — NURSING HOME VISIT (OUTPATIENT)
Dept: GERIATRICS | Facility: CLINIC | Age: 83
End: 2018-05-30
Payer: COMMERCIAL

## 2018-05-30 VITALS
HEART RATE: 61 BPM | RESPIRATION RATE: 18 BRPM | TEMPERATURE: 98.3 F | SYSTOLIC BLOOD PRESSURE: 144 MMHG | DIASTOLIC BLOOD PRESSURE: 67 MMHG

## 2018-05-30 DIAGNOSIS — S92.302D CLOSED NONDISPLACED FRACTURE OF METATARSAL BONE OF LEFT FOOT WITH ROUTINE HEALING, UNSPECIFIED METATARSAL, SUBSEQUENT ENCOUNTER: Primary | ICD-10-CM

## 2018-05-30 DIAGNOSIS — I25.118 CORONARY ARTERY DISEASE OF NATIVE HEART WITH STABLE ANGINA PECTORIS, UNSPECIFIED VESSEL OR LESION TYPE (H): ICD-10-CM

## 2018-05-30 DIAGNOSIS — Z86.73 H/O: CVA (CEREBROVASCULAR ACCIDENT): ICD-10-CM

## 2018-05-30 DIAGNOSIS — R53.81 PHYSICAL DECONDITIONING: ICD-10-CM

## 2018-05-30 DIAGNOSIS — I50.9 CHRONIC CONGESTIVE HEART FAILURE, UNSPECIFIED CONGESTIVE HEART FAILURE TYPE: ICD-10-CM

## 2018-05-30 DIAGNOSIS — N18.30 CKD (CHRONIC KIDNEY DISEASE) STAGE 3, GFR 30-59 ML/MIN (H): ICD-10-CM

## 2018-05-30 DIAGNOSIS — F02.80 ALZHEIMER'S DEMENTIA WITHOUT BEHAVIORAL DISTURBANCE, UNSPECIFIED TIMING OF DEMENTIA ONSET: ICD-10-CM

## 2018-05-30 DIAGNOSIS — J44.9 CHRONIC OBSTRUCTIVE PULMONARY DISEASE, UNSPECIFIED COPD TYPE (H): ICD-10-CM

## 2018-05-30 DIAGNOSIS — M80.00XD AGE-RELATED OSTEOPOROSIS WITH CURRENT PATHOLOGICAL FRACTURE WITH ROUTINE HEALING, SUBSEQUENT ENCOUNTER: ICD-10-CM

## 2018-05-30 DIAGNOSIS — K21.9 GASTROESOPHAGEAL REFLUX DISEASE, ESOPHAGITIS PRESENCE NOT SPECIFIED: ICD-10-CM

## 2018-05-30 DIAGNOSIS — G30.9 ALZHEIMER'S DEMENTIA WITHOUT BEHAVIORAL DISTURBANCE, UNSPECIFIED TIMING OF DEMENTIA ONSET: ICD-10-CM

## 2018-05-30 DIAGNOSIS — F32.0 MILD MAJOR DEPRESSION (H): ICD-10-CM

## 2018-05-30 DIAGNOSIS — I10 ESSENTIAL HYPERTENSION, BENIGN: ICD-10-CM

## 2018-05-30 PROCEDURE — 99310 SBSQ NF CARE HIGH MDM 45: CPT | Performed by: NURSE PRACTITIONER

## 2018-05-30 RX ORDER — POLYVINYL ALCOHOL 14 MG/ML
1 SOLUTION/ DROPS OPHTHALMIC 2 TIMES DAILY
COMMUNITY
End: 2019-02-18

## 2018-05-30 RX ORDER — LEVALBUTEROL INHALATION SOLUTION 1.25 MG/3ML
1 SOLUTION RESPIRATORY (INHALATION) EVERY 4 HOURS PRN
COMMUNITY
End: 2020-01-01

## 2018-05-30 RX ORDER — HYDROCODONE BITARTRATE AND ACETAMINOPHEN 5; 325 MG/1; MG/1
1-2 TABLET ORAL SEE ADMIN INSTRUCTIONS
COMMUNITY
End: 2018-11-20

## 2018-05-30 NOTE — PROGRESS NOTES
[facility]  PRIMARY CARE PROVIDER AND CLINIC:  Kim Hidalgo [address] / [place] [address]  Chief Complaint   Patient presents with     Hospital F/U     [facility] Record Number:  [id number]    HPI:     Jazmin Clifton is a 85 year old  (12/30/1932),admitted to the [facility] from [facility] .  Hospital stay 5/28/18 through 5/29/18.  Admitted to this facility for  rehab, medical management and nursing care.  HPI information obtained from: facility chart records, facility staff, patient report and [facility] chart review.      From hospital discharge summary:    Jazmin Clifton is a 85 y.o. old female w/ PMH of hypertension, COPD, hyperlipidemia, dementia, CHF, brain aneurysm, stroke, GERD, CKD, and depression who was admitted after mechanical fall. Patient stated she tripped on her table, denies LOC or hitting her head. X-ray of the left foot nondisplaced transverse fracture through proximal shafts of the second, third, and fourth metatarsals with a probable nondisplaced fracture of the distal shaft of the luis metatarsal without dislocation. Discussed case with podiatry, Dr. Gibbs, who recommended immobilization and follow-up as outpatient, no need for acute surgical management. Fractrues 2/2 to osteoporosis with History of compression fractures and vertebroplasty. Creatinine clearance 42, discussed with patient and will initiate bisphosphonate at this time       Current issues are:      Closed nondisplaced fracture of metatarsal bone of left foot with routine healing, unspecified metatarsal, subsequent encounter  Physical deconditioning  Reports significant pain in LLE. Has only been taking her scheduled APAP 1000mg TID, has not used her PRN norco in TCU. States she is having muscle spasms in left calf. Has been on muscle relaxants before and feels that they worked well. She has not been icing her foot. She is NWB, has surgical boot in place.  Ambulated short distance with walker, used WC for longer distances prior to fall. She live in an [facility].     Age-related osteoporosis with current pathological fracture with routine healing, subsequent encounter  Fractures felt to be related to osteoporosis and osteopenia. Plan to start fosamax on discharge. However due to cost will plan to start this after discharge from TCU. She is on a calcium-vitamin D supplemt.     Coronary artery disease of native heart with stable angina pectoris, unspecified vessel or lesion type (H)  Essential hypertension, benign  Chronic congestive heart failure, unspecified congestive heart failure type (H)  Did have low oxygen sats last night, was started on 2L oxygen with improvement. She did reports some SOB, which improved. Denies any chest pain. She is currently on ASA 81mg, lipitor, lasix 40mg daily, imdur 30mg, lisinopril 30mg daily. She does have some LE, worse on right side since cyst removal surgery earlier this year.     Alzheimer's dementia without behavioral disturbance, unspecified timing of dementia onset  Does reports some memory issues, lives in retirement. She is on namenda 5mg daily.     Gastroesophageal reflux disease, esophagitis presence not specified  Denies any abdominal pain, currently on protonix 40mg daily, famotidine 20mg BID    H/O: CVA (cerebrovascular accident)  Symptoms resolved, on ASA 81mg, lipitor,     CKD (chronic kidney disease) stage 3, GFR 30-59 ml/min  Baseline likely 0.9-1.2, stable during recent hospitalization     Chronic obstructive pulmonary disease, unspecified COPD type (H)  Use levalbuterol at home as does not cause as much shaking as albuterol does. She is currently on 2L NC in TCU as had some hypoxia overnight. Denies any SOB, does not use CPAP. She is currently on Anoro Ellipta inhaler daily, cough syrup PRN, Breo Ellipta daily. Reports overall she is feeling fine.     Mild major depression (H)  Currently on celexa. Feels her mood has been stable.        CODE STATUS/ADVANCE DIRECTIVES DISCUSSION:   CPR/Full code   Patient's living condition: lives in an assisted living facility    ALLERGIES:Accupril; Ace inhibitors; Augmentin; Levofloxacin; Morphine; and Norvasc [amlodipine besylate]  PAST MEDICAL HISTORY:  has a past medical history of ABDOMINAL PAIN GENERALIZED (3/15/2006); Abdominal pain, generalized (3/15/2006); Atherosclerosis of renal artery (H); BENIGN HYPERTENSION (5/1/2006); Benign neoplasm of scalp and skin of neck; Cerebral aneurysm, nonruptured; Depressive disorder, not elsewhere classified; Esophageal reflux; Female stress incontinence; Gastrointestinal malfunction arising from mental factors; Generalized osteoarthrosis, unspecified site; Herpes zoster dermatitis of eyelid; Insomnia, unspecified; Lumbago; Other chest pain; Other specified cardiac dysrhythmias(427.89); Rectocele; Unspecified disorder of kidney and ureter; and Unspecified essential hypertension.  PAST SURGICAL HISTORY:  has a past surgical history that includes surgical history of - ; surgical history of - ; surgical history of -  (1996); surgical history of - ; surgical history of - ; cholecystectomy, laporoscopic (1997); hysterectomy, mirtha (1982); and Endarterectomy carotid (Right, 8/31/2017).  FAMILY HISTORY: family history includes Asthma in her son; CANCER in her brother and mother; CEREBROVASCULAR DISEASE in her father; DIABETES in her son; Eye Disorder in her son; GASTROINTESTINAL DISEASE in her son; HEART DISEASE in her father. There is no history of C.A.D., Hypertension, Breast Cancer, Cancer - colorectal, or Prostate Cancer.  SOCIAL HISTORY:  reports that she has quit smoking. She does not have any smokeless tobacco history on file. She reports that she drinks alcohol. She reports that she does not use illicit drugs.    Post Discharge Medication Reconciliation Status: discharge medications reconciled and changed, per note/orders (see AVS).  Current Outpatient Prescriptions    Medication Sig Dispense Refill     ACETAMINOPHEN PO Take 650 mg by mouth every 6 hours       aspirin 81 MG chewable tablet Take 81 mg by mouth       atorvastatin (LIPITOR) 40 MG tablet TAKE 1 TAB BY MOUTH AT BEDTIME FOR HIGH CHOLESTEROL 30 tablet 3     bisacodyl (DULCOLAX) 10 MG Suppository Place 10 mg rectally daily as needed for constipation       Calcium-Vitamin D 600-200 MG-UNIT TABS Take 1 tablet by mouth daily       CITALOPRAM HYDROBROMIDE PO Take 10 mg by mouth daily       cyanocobalamin (VITAMIN  B-12) 1000 MCG tablet Take 1,000 mcg by mouth daily       CYCLOBENZAPRINE HCL PO Take 5 mg by mouth 3 times daily as needed for muscle spasms       Docusate Sodium (COLACE PO) Take 100 mg by mouth daily       ESTRIOL 1MG/GRAM CREAM Apply 1 g topically See Admin Instructions Insert 1 application vaginally at bedtime every Mon, Wed, Fri for vaginal dryness, burning, itching apply pea size amount vaginally at bedtime three times a week       FAMOTIDINE PO Take 20 mg by mouth 2 times daily       fluticasone-vilanterol (BREO ELLIPTA) 100-25 MCG/INH oral inhaler Inhale 1 puff into the lungs daily       Furosemide (LASIX PO) Take 40 mg by mouth daily        guaiFENesin (ROBITUSSIN) 100 MG/5ML SYRP Take 5 mLs by mouth every 4 hours as needed for cough       HYDROcodone-acetaminophen (NORCO) 5-325 MG per tablet Take 1-2 tablets by mouth See Admin Instructions Give 1 tablet by mouth every 4 hours as needed for moderate to severe pain 1 tab for pain 1-5/10 AND Give 2 tablet by mouth every 4 hours as needed for moderate to severe pain 2 tabs for pain 6-10/10       isosorbide mononitrate (IMDUR) 30 MG 24 hr tablet TAKE 1 TAB BY MOUTH ONCE DAILY FOR HYPERTENSION/HIGH BLOOD PRESSURE 30 tablet 3     isradipine (DYNACIRC) 5 MG capsule Take 5 mg by mouth 2 times daily       levalbuterol (XOPENEX) 1.25 MG/3ML neb solution Take 1 ampule by nebulization every 4 hours as needed for shortness of breath / dyspnea or wheezing        Lidocaine & Menthol-Methyl Sal 5 & 3-10 % THPK Externally apply topically 3 times daily as needed       lisinopril (PRINIVIL,ZESTRIL) 30 MG tablet TAKE 1 TAB BY MOUTH ONCE DAILY FOR HYPERTENSION/HIGH BLOOD PRESSURE 30 tablet 3     MAGNESIUM OXIDE PO Take 250 mg by mouth daily       memantine (NAMENDA) 5 MG tablet TAKE 1 TAB BY MOUTH ONCE DAILY FOR DEMENTIA 30 tablet 3     Menthol, Topical Analgesic, (BIOFREEZE) 4 % GEL Externally apply topically 4 times daily as needed       nystatin (MYCOSTATIN) 665334 UNIT/GM POWD Apply topically 2 times daily as needed        pantoprazole (PROTONIX) 40 MG EC tablet TAKE 1 TAB BY MOUTH ONCE DAILY BEFORE BREAKFAST FOR GERD 30 tablet 3     polyvinyl alcohol (LIQUIFILM TEARS) 1.4 % ophthalmic solution Place 2 drops into both eyes as needed for dry eyes       umeclidinium-vilanterol (ANORO ELLIPTA) 62.5-25 MCG/INH oral inhaler Inhale 1 puff into the lungs daily         ROS:  10 point ROS of systems including Constitutional, Eyes, Respiratory, Cardiovascular, Gastroenterology, Genitourinary, Integumentary, Muscularskeletal, Psychiatric were all negative except for pertinent positives noted in my HPI.    Exam:  /67  Pulse 61  Temp 98.3  F (36.8  C)  Resp 18  GENERAL APPEARANCE:  Alert, in no distress, oriented, cooperative  RESP:  respiratory effort and palpation of chest normal, diminished breath sounds bilat bases, scattered rhonchi bilat bases, expiratory wheezes  CV:  Palpation and auscultation of heart done , regular rate and rhythm, no murmur, rub, or gallop, +2 pedal pulses, peripheral edema 1-2+ in RLE  ABDOMEN:  normal bowel sounds, soft, nontender, no hepatosplenomegaly or other masses, large, round, no guarding or rebound  M/S:   Gait and station abnormal NWB to LLE, mild tenderness to left calf  SKIN:  Inspection of skin and subcutaneous tissue baseline, Palpation of skin and subcutaneous tissue baseline  NEURO:   Cranial nerves 2-12 are normal tested and grossly at  patient's baseline, Examination of sensation by touch normal  PSYCH:  oriented to self, place, situation, memory impaired , affect and mood normal    Lab/Diagnostic data:     CBC RESULTS:   Recent Labs   Lab Test  09/11/17   0650  08/31/17   1113  08/08/17   0636   WBC  4.4   --   4.4   RBC  3.53*   --   3.70*   HGB  9.8*   --   10.2*   HCT  31.5*   --   33.1*   MCV  89   --   90   MCH  27.8   --   27.6   MCHC  31.1*   --   30.8*   RDW  15.7*   --   14.4   PLT  173  172  142*       Last Basic Metabolic Panel:  Recent Labs   Lab Test  09/11/17   0650  08/31/17   1113   NA  144  141   POTASSIUM  4.3  5.3   CHLORIDE  109  107   BLAIR  9.4  9.5   CO2  28  28   BUN  34*  36*   CR  1.09*  1.42*   GLC  82  102*         TSH   Date Value Ref Range Status   08/14/2017 0.62 0.40 - 4.00 mU/L Final   04/18/2007 0.60 0.4 - 5.0 mU/L Final       Lab Results   Component Value Date    A1C 6.2 08/31/2017       ASSESSMENT/PLAN:  (S92.302D) Closed nondisplaced fracture of metatarsal bone of left foot with routine healing, unspecified metatarsal, subsequent encounter  (primary encounter diagnosis)  (R53.81) Physical deconditioning  Comment: pain not controlled, but given high use of scheduled APAP only limited use of norco allowed, does feel she is havign cramps.   Plan: PT/OT, decrease APAP, add PRN flexeril, NWB to LLE, ice PRN, boot on at all times, f/u with podiatry in 1 week    (M80.00XD) Age-related osteoporosis with current pathological fracture with routine healing, subsequent encounter  Comment: likely contributing to fractures  Plan: start on discharge from TCU due to cost, continue calcium supplement    (I25.118) Coronary artery disease of native heart with stable angina pectoris, unspecified vessel or lesion type (H)  (I10) Essential hypertension, benign  Comment: chronic, stable, limited data in TCU  Plan: Continue medications as above, monitor and adjust PRN.      (I50.9) Chronic congestive heart failure, unspecified  congestive heart failure type (H)  Comment: mild hyerpvolemia  Plan: compression hose to RLE, medications as above, daily weight    (G30.9,  F02.80) Alzheimer's dementia without behavioral disturbance, unspecified timing of dementia onset  Comment: chronic  Plan: continue namenda    (K21.9) Gastroesophageal reflux disease, esophagitis presence not specified  Comment: stable  Plan: medications as abvoe    (Z86.73) H/O: CVA (cerebrovascular accident)  Comment: neurologically at baseline  Plan: Continue medications as above, monitor and adjust PRN.    (N18.3) CKD (chronic kidney disease) stage 3, GFR 30-59 ml/min  Comment: stable  Plan: Control BP. Control BG. Avoid nephrotoxic medications. BMP to check for stability    (J44.9) Chronic obstructive pulmonary disease, unspecified COPD type (H)  Comment: fairly stable, ? If element of sleep apnea contributing given body habitus  Plan: medications as above, wean off oxygen, monitor and adjust PRN    (F32.0) Mild major depression (H)  Comment: stable  Plan: Continue medications as above, monitor and adjust PRN.    Orders:  1. Ice elevate left foot when at rest  2. Compression garment to RLE. on in AM, off in PM  3. Oxygen 1-3 L nasal canula PRN to keep O2 Sats > 90% ok to wean off   4. Avoid albuterol nebs on ADALBERTO please use prn levalbuterol as causes less shaking(already Ordered)  5. cyclobenzaprine 5 mg PO TID PRN Dx: Muscle spasms  6. Biofreeze 4% gel apply to left calf, low back/Hips QID PRN Dx: Pain  7. BMP, CBC on 6/4 Dx: CHF, Anemia  8. Change tylenol to 650 mg PO every 6 hours       Total time spent with patient visit at the skilled nursing facility was 45 min including patient visit and review of past records. Greater than 50% of total time spent with counseling and coordinating care due to chart review, HPI, discussion of plan of care    Electronically signed by:  MARY Gómez CNP

## 2018-05-31 ENCOUNTER — COMMUNICATION - HEALTHEAST (OUTPATIENT)
Dept: RESPIRATORY THERAPY | Facility: HOSPITAL | Age: 83
End: 2018-05-31

## 2018-06-04 ENCOUNTER — HOSPITAL LABORATORY (OUTPATIENT)
Facility: OTHER | Age: 83
End: 2018-06-04

## 2018-06-04 LAB
ANION GAP SERPL CALCULATED.3IONS-SCNC: 6 MMOL/L (ref 3–14)
BUN SERPL-MCNC: 54 MG/DL (ref 7–30)
CALCIUM SERPL-MCNC: 9.2 MG/DL (ref 8.5–10.1)
CHLORIDE SERPL-SCNC: 107 MMOL/L (ref 94–109)
CO2 SERPL-SCNC: 29 MMOL/L (ref 20–32)
CREAT SERPL-MCNC: 1.38 MG/DL (ref 0.52–1.04)
ERYTHROCYTE [DISTWIDTH] IN BLOOD BY AUTOMATED COUNT: 15.9 % (ref 10–15)
GFR SERPL CREATININE-BSD FRML MDRD: 36 ML/MIN/1.7M2
GLUCOSE SERPL-MCNC: 100 MG/DL (ref 70–99)
HCT VFR BLD AUTO: 32.7 % (ref 35–47)
HGB BLD-MCNC: 9.7 G/DL (ref 11.7–15.7)
MCH RBC QN AUTO: 26.2 PG (ref 26.5–33)
MCHC RBC AUTO-ENTMCNC: 29.7 G/DL (ref 31.5–36.5)
MCV RBC AUTO: 88 FL (ref 78–100)
PLATELET # BLD AUTO: 172 10E9/L (ref 150–450)
POTASSIUM SERPL-SCNC: 4.2 MMOL/L (ref 3.4–5.3)
RBC # BLD AUTO: 3.7 10E12/L (ref 3.8–5.2)
SODIUM SERPL-SCNC: 142 MMOL/L (ref 133–144)
WBC # BLD AUTO: 5.3 10E9/L (ref 4–11)

## 2018-06-05 ENCOUNTER — NURSING HOME VISIT (OUTPATIENT)
Dept: GERIATRICS | Facility: CLINIC | Age: 83
End: 2018-06-05
Payer: COMMERCIAL

## 2018-06-05 VITALS
WEIGHT: 203.4 LBS | SYSTOLIC BLOOD PRESSURE: 148 MMHG | OXYGEN SATURATION: 93 % | TEMPERATURE: 97.7 F | RESPIRATION RATE: 18 BRPM | DIASTOLIC BLOOD PRESSURE: 75 MMHG | BODY MASS INDEX: 36.03 KG/M2 | HEART RATE: 63 BPM

## 2018-06-05 DIAGNOSIS — J44.9 CHRONIC OBSTRUCTIVE PULMONARY DISEASE, UNSPECIFIED COPD TYPE (H): ICD-10-CM

## 2018-06-05 DIAGNOSIS — K59.01 SLOW TRANSIT CONSTIPATION: ICD-10-CM

## 2018-06-05 DIAGNOSIS — K21.9 GASTROESOPHAGEAL REFLUX DISEASE, ESOPHAGITIS PRESENCE NOT SPECIFIED: ICD-10-CM

## 2018-06-05 DIAGNOSIS — S92.302D CLOSED NONDISPLACED FRACTURE OF METATARSAL BONE OF LEFT FOOT WITH ROUTINE HEALING, UNSPECIFIED METATARSAL, SUBSEQUENT ENCOUNTER: Primary | ICD-10-CM

## 2018-06-05 DIAGNOSIS — R53.81 PHYSICAL DECONDITIONING: ICD-10-CM

## 2018-06-05 PROCEDURE — 99309 SBSQ NF CARE MODERATE MDM 30: CPT | Performed by: NURSE PRACTITIONER

## 2018-06-05 RX ORDER — POLYETHYLENE GLYCOL 3350 17 G/17G
17 POWDER, FOR SOLUTION ORAL DAILY PRN
COMMUNITY
End: 2018-06-26

## 2018-06-05 RX ORDER — AMOXICILLIN 250 MG
1 CAPSULE ORAL 2 TIMES DAILY
COMMUNITY
End: 2019-01-04

## 2018-06-05 NOTE — PROGRESS NOTES
[facility]    Chief Complaint   Patient presents with     senior living Acute       [facility] Medical Record Number:  [id number]    HPI:    Jazmin Clifton is a 85 year old  (12/30/1932), who is being seen today for an episodic [facility].  HPI information obtained from: facility chart records, facility staff, patient report and [facility] chart review.    Last 3 BPs: 148/75, 139/63, 123/68  Admission Weight: 201.4lbs  Current Weight: 203.4lbs  Blood Sugar Range:   N/A    Today's concern is:  Closed nondisplaced fracture of metatarsal bone of left foot with routine healing, unspecified metatarsal, subsequent encounter  Physical deconditioning  Remains NWB to LLE, reports mild pain left foot, mostly with touching. She requires max assist with transfers, has difficulty standing. Pain controlled on PRN norco 1-2 tabs q4hr PRN, APAP 650mg q 6h PRN. She wears surgical boot to left foot at all times.     Slow transit constipation  Staff reports she required suppository to have a BM yesterday. She denies feeling constipated. She has not been on regularly scheduled laxatives.     Chronic obstructive pulmonary disease, unspecified COPD type (H)  Has been weaned off oxygen during the day, continues to require 2L NC at night while sleeping for O2 sat <90%. She reports her breathing feels stable, does have some SOB with activity, has chronic cough which is stable, nonproductive. She continues on breo ellipta, anoro ellipta, PRN cough syrup, xopenex nebs PRN.     Gastroesophageal reflux disease, esophagitis presence not specified  Has been on pepcid 20mg BID and Protonix 20mg daily. States she has been on this for a long time, does not remember any recent symptoms  No indigestion, heart burn, excessive bruping, or nausea.       ALLERGIES: Accupril; Ace inhibitors; Augmentin; Levofloxacin; Morphine; and Norvasc [amlodipine besylate]  Past Medical, Surgical, Family and Social History  reviewed and updated in [facility].    Current Outpatient Prescriptions   Medication Sig Dispense Refill     ACETAMINOPHEN PO Take 650 mg by mouth every 6 hours       aspirin 81 MG chewable tablet Take 81 mg by mouth       atorvastatin (LIPITOR) 40 MG tablet TAKE 1 TAB BY MOUTH AT BEDTIME FOR HIGH CHOLESTEROL 30 tablet 3     bisacodyl (DULCOLAX) 10 MG Suppository Place 10 mg rectally daily as needed for constipation       Calcium-Vitamin D 600-200 MG-UNIT TABS Take 1 tablet by mouth daily       CITALOPRAM HYDROBROMIDE PO Take 10 mg by mouth daily       cyanocobalamin (VITAMIN  B-12) 1000 MCG tablet Take 1,000 mcg by mouth daily       CYCLOBENZAPRINE HCL PO Take 5 mg by mouth 3 times daily as needed for muscle spasms       ESTRIOL 1MG/GRAM CREAM Apply 1 g topically See Admin Instructions Insert 1 application vaginally at bedtime every Mon, Wed, Fri for vaginal dryness, burning, itching apply pea size amount vaginally at bedtime three times a week       FAMOTIDINE PO Take 20 mg by mouth 2 times daily       fluticasone-vilanterol (BREO ELLIPTA) 100-25 MCG/INH oral inhaler Inhale 1 puff into the lungs daily       Furosemide (LASIX PO) Take 40 mg by mouth daily        GABAPENTIN PO Take 300 mg by mouth 2 times daily       guaiFENesin (ROBITUSSIN) 100 MG/5ML SYRP Take 5 mLs by mouth every 4 hours as needed for cough       HYDROcodone-acetaminophen (NORCO) 5-325 MG per tablet Take 1-2 tablets by mouth See Admin Instructions Give 1 tablet by mouth every 4 hours as needed for moderate to severe pain 1 tab for pain 1-5/10 AND Give 2 tablet by mouth every 4 hours as needed for moderate to severe pain 2 tabs for pain 6-10/10       isosorbide mononitrate (IMDUR) 30 MG 24 hr tablet TAKE 1 TAB BY MOUTH ONCE DAILY FOR HYPERTENSION/HIGH BLOOD PRESSURE 30 tablet 3     isradipine (DYNACIRC) 5 MG capsule Take 5 mg by mouth 2 times daily       levalbuterol (XOPENEX) 1.25 MG/3ML neb solution Take 1 ampule by nebulization every 4 hours as  needed for shortness of breath / dyspnea or wheezing       Lidocaine & Menthol-Methyl Sal 5 & 3-10 % THPK Externally apply topically 3 times daily as needed       lisinopril (PRINIVIL,ZESTRIL) 30 MG tablet TAKE 1 TAB BY MOUTH ONCE DAILY FOR HYPERTENSION/HIGH BLOOD PRESSURE 30 tablet 3     MAGNESIUM OXIDE PO Take 250 mg by mouth daily       memantine (NAMENDA) 5 MG tablet TAKE 1 TAB BY MOUTH ONCE DAILY FOR DEMENTIA 30 tablet 3     Menthol, Topical Analgesic, (BIOFREEZE) 4 % GEL Externally apply topically 4 times daily as needed       nystatin (MYCOSTATIN) 978880 UNIT/GM POWD Apply topically 2 times daily as needed        pantoprazole (PROTONIX) 40 MG EC tablet TAKE 1 TAB BY MOUTH ONCE DAILY BEFORE BREAKFAST FOR GERD 30 tablet 3     polyethylene glycol (MIRALAX/GLYCOLAX) Packet Take 17 g by mouth daily as needed for constipation       polyvinyl alcohol (LIQUIFILM TEARS) 1.4 % ophthalmic solution Place 1 drop into both eyes 2 times daily And 4 times a day PRN       senna-docusate (SENOKOT-S;PERICOLACE) 8.6-50 MG per tablet Take 1 tablet by mouth 2 times daily as needed        umeclidinium-vilanterol (ANORO ELLIPTA) 62.5-25 MCG/INH oral inhaler Inhale 1 puff into the lungs daily       Medications reviewed:  Medications reconciled to facility chart and changes were made to reflect current medications as identified as above med list. Below are the changes that were made:   Medications stopped since last EPIC medication reconciliation:   There are no discontinued medications.    Medications started since last [facility] medication reconciliation:  No orders of the defined types were placed in this encounter.        REVIEW OF SYSTEMS:  4 point ROS including Respiratory, CV, GI and , other than that noted in the HPI,  is negative    Physical Exam:  /75  Pulse 63  Temp 97.7  F (36.5  C)  Resp 18  Wt 203 lb 6.4 oz (92.3 kg)  SpO2 93%  BMI 36.03 kg/m2  GENERAL APPEARANCE:  Alert, in no distress, cooperative  RESP:   respiratory effort and palpation of chest normal, no respiratory distress, diminished breath sounds bilat bases, course lung sounds  CV:  Palpation and auscultation of heart done , regular rate and rhythm, no murmur, rub, or gallop, +2 pedal pulses, peripheral edema 2+ in RLE  ABDOMEN:  normal bowel sounds, soft, nontender, no hepatosplenomegaly or other masses, no guarding or rebound  M/S:   Gait and station abnormal non-ambulatory, NWB to LLE, surgical boot in place, mild tenerness pain with mvt to toes on left foot  SKIN:  Inspection of skin and subcutaneous tissue baseline, Palpation of skin and subcutaneous tissue baseline, moderate bruising to left foot  NEURO:   Cranial nerves 2-12 are normal tested and grossly at patient's baseline  PSYCH:  oriented to self, place, time, memory impaired , affect and mood normal    Recent Labs:     CBC RESULTS:   Recent Labs   Lab Test  06/04/18   0850  09/11/17   0650   WBC  5.3  4.4   RBC  3.70*  3.53*   HGB  9.7*  9.8*   HCT  32.7*  31.5*   MCV  88  89   MCH  26.2*  27.8   MCHC  29.7*  31.1*   RDW  15.9*  15.7*   PLT  172  173       Last Basic Metabolic Panel:  Recent Labs   Lab Test  06/04/18   0850  09/11/17   0650   NA  142  144   POTASSIUM  4.2  4.3   CHLORIDE  107  109   BLAIR  9.2  9.4   CO2  29  28   BUN  54*  34*   CR  1.38*  1.09*   GLC  100*  82         TSH   Date Value Ref Range Status   08/14/2017 0.62 0.40 - 4.00 mU/L Final   04/18/2007 0.60 0.4 - 5.0 mU/L Final       Lab Results   Component Value Date    A1C 6.2 08/31/2017         Assessment/Plan:  (S92.302D) Closed nondisplaced fracture of metatarsal bone of left foot with routine healing, unspecified metatarsal, subsequent encounter  (primary encounter diagnosis)  (R53.81) Physical deconditioning  Comment: pain controlled, mobility limited d/t NWB  Plan: PT/OT, f/u with podiatry as scheduled, pain regimen as above    (K59.01) Slow transit constipation  Comment: symptomatic, likely d/t decreased mobility,  narcotic use  Plan: add senna-a BID, PRN miralax, monitor and adjust PRN    (J44.9) Chronic obstructive pulmonary disease, unspecified COPD type (H)  Comment: respiratory status improving, continues to require O2 @ NOC, no s/s acute exacerbation  Plan: wean oxygen as able, IS while awake, nebs PRN, other medications as above, monitor and adjust PRN, monitor daily weights    (K21.9) Gastroesophageal reflux disease, esophagitis presence not specified  Comment: stable, on duplicate treatment with HH2 and PPI  Plan: decrease pepcid to qhs, continue protonix, if tolerates consider weaning off pepcid.     Orders:  1. Decrease famotidine to 20 mg PO Q HS Dx: GERD. Notify provider if having symptoms of GERD -Nausea, indegestion, heart burn, etc  2. D/C Docusate  3. Start senna s 1 tab PO BID hold for louse stools Dx: Constipation  4. miralax 17 gm PO every day PRN Dx: Constipation        Electronically signed by  MARY Gómez CNP

## 2018-06-09 NOTE — PROGRESS NOTES
"[place] [facility]  INITIAL VISIT NOTE  June 11, 2018    PRIMARY CARE PROVIDER AND CLINIC:  Roby Willis PHYSICIAN [facility] [address] [facility] [address]*    Chief Complaint   Patient presents with     Hospital F/U       HPI:    Jazmin Clifton is a 85 year old  (12/30/1932) female who was seen at [facility] on June 11, 2018 for an initial visit. Medical history is notable for COPD, HTN, CHF, CVA, osteopenia, thoracic compression fractures and dementia. She was hospitalized at [facility] from  5/28/18 to 5/29/18 where she presented with left foot pain after a fall and was found to have left 2nd-5th non-displaced metatarsal fractures. Podiatry recommended immobilization with splint, NWB and outpatient follow up. She was admitted to this facility for medical management and rehab.     Today, Ms. Clifton is seen in her room. Overall is doing OK. Notes her legs are more swollen than at home. Says she usually weighs about 198 lbs at home - weights here 202-204 lbs. She does not want to increase her \"water pill\", says she just needs to get her feet up more. No chest pain or dyspnea. No abdominal pain. No diarrhea or constipation. Working with therapies. No concerns per nursing.     CODE STATUS:   CPR/Full code     ALLERGIES:     Allergies   Allergen Reactions     Accupril      Ace Inhibitors Unknown     Accupril     Augmentin Unknown     Levofloxacin Unknown     Morphine Other (See Comments)     hallucinations     Norvasc [Amlodipine Besylate]      Leg swelling         PAST MEDICAL HISTORY:   Past Medical History:   Diagnosis Date     ABDOMINAL PAIN GENERALIZED 3/15/2006    1 month of abdominal pain that bryn radiates into her back and chest.  Pt was hospitilzed 2x for the pain at [facility] --pt hopsitized for 3 days.  Rcords not ab=vailable.  She was told either \" twisted intestine or blockage of bowel.  Pt feels it is the hiatal hernia.  Pt hospitilized again a second time  A " "month ago.  Ct scans x 2 showed \" nothing.\"  Pt had a barium and xrays.  Pt sa     Abdominal pain, generalized 3/15/2006    1 month of abdominal pain that bryn radiates into her back and chest.  Pt was hospitilzed 2x for the pain at [facility] --pt hopsitized for 3 days.  Rcords not ab=vailable.  She was told either \" twisted intestine or blockage of bowel.  Pt feels it is the hiatal hernia.  Pt hospitilized again a second time  A month ago.  Ct scans x 2 showed \" nothing.\"  Pt had a barium and xrays.  Pt sa     Atherosclerosis of renal artery (H)     Left renal artery stenosis     BENIGN HYPERTENSION 5/1/2006 5/1/2006   Increase catapres to 0.3 mg and decrease clonidine to 0.1 mg daily.  Recheck bp in 1 month.      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     Dyspepsia     Generalized osteoarthrosis, unspecified site     DJD-chronic back pain     Herpes zoster dermatitis of eyelid      Insomnia, unspecified      Lumbago     Chronic back pain     Other chest pain     Atypical Chest Pain     Other specified cardiac dysrhythmias(427.89)     Bradycardia, improved on lower dose beta blockers      Rectocele     Grade 3     Unspecified disorder of kidney and ureter     Renal insufficiency     Unspecified essential hypertension        PAST SURGICAL HISTORY:   Past Surgical History:   Procedure Laterality Date     CHOLECYSTECTOMY, LAPOROSCOPIC  1997    Cholecystectomy, Laparoscopic     ENDARTERECTOMY CAROTID Right 8/31/2017    Procedure: ENDARTERECTOMY CAROTID;  RIGHT CAROTID ENDARTERECTOMY WITH EEG;  Surgeon: Hilario Parry MD;  Location:  OR     HYSTERECTOMY, RAYMOND  1982    oophorectomy,RAYMOND     SURGICAL " HISTORY OF -       Laminectomy x 3     SURGICAL HISTORY OF -       MCA Aneurysm repair     SURGICAL HISTORY OF -   1996    Bladder suspension (Bladder Repair x2)     SURGICAL HISTORY OF -       Bilateral Hand Surgeries for Arthritis x2     SURGICAL HISTORY OF -       Repair of a Cerebral Aneurysm       FAMILY HISTORY:   Family History   Problem Relation Age of Onset     CANCER Mother      stomache     CEREBROVASCULAR DISEASE Father      HEART DISEASE Father      CANCER Brother      lymphoma     Eye Disorder Son      Asthma Son      DIABETES Son      GASTROINTESTINAL DISEASE Son      no control over bladder or bowels     C.A.D. No family hx of      Hypertension No family hx of      Breast Cancer No family hx of      Cancer - colorectal No family hx of      Prostate Cancer No family hx of        SOCIAL HISTORY:   Lives in AL facility     MEDICATIONS:  Current Outpatient Prescriptions   Medication Sig Dispense Refill     ACETAMINOPHEN PO Take 650 mg by mouth every 6 hours       aspirin 81 MG chewable tablet Take 81 mg by mouth       atorvastatin (LIPITOR) 40 MG tablet TAKE 1 TAB BY MOUTH AT BEDTIME FOR HIGH CHOLESTEROL 30 tablet 3     bisacodyl (DULCOLAX) 10 MG Suppository Place 10 mg rectally daily as needed for constipation       Calcium-Vitamin D 600-200 MG-UNIT TABS Take 1 tablet by mouth daily       CITALOPRAM HYDROBROMIDE PO Take 10 mg by mouth daily       cyanocobalamin (VITAMIN  B-12) 1000 MCG tablet Take 1,000 mcg by mouth daily       CYCLOBENZAPRINE HCL PO Take 5 mg by mouth 3 times daily as needed for muscle spasms       ESTRIOL 1MG/GRAM CREAM Apply 1 g topically See Admin Instructions Insert 1 application vaginally at bedtime every Mon, Wed, Fri for vaginal dryness, burning, itching apply pea size amount vaginally at bedtime three times a week       FAMOTIDINE PO Take 20 mg by mouth 2 times daily       fluticasone-vilanterol (BREO ELLIPTA) 100-25 MCG/INH oral inhaler Inhale 1 puff into the lungs daily        Furosemide (LASIX PO) Take 40 mg by mouth daily        GABAPENTIN PO Take 300 mg by mouth 2 times daily       guaiFENesin (ROBITUSSIN) 100 MG/5ML SYRP Take 5 mLs by mouth every 4 hours as needed for cough       HYDROcodone-acetaminophen (NORCO) 5-325 MG per tablet Take 1-2 tablets by mouth See Admin Instructions Give 1 tablet by mouth every 4 hours as needed for moderate to severe pain 1 tab for pain 1-5/10 AND Give 2 tablet by mouth every 4 hours as needed for moderate to severe pain 2 tabs for pain 6-10/10       isosorbide mononitrate (IMDUR) 30 MG 24 hr tablet TAKE 1 TAB BY MOUTH ONCE DAILY FOR HYPERTENSION/HIGH BLOOD PRESSURE 30 tablet 3     isradipine (DYNACIRC) 5 MG capsule Take 5 mg by mouth 2 times daily       levalbuterol (XOPENEX) 1.25 MG/3ML neb solution Take 1 ampule by nebulization every 4 hours as needed for shortness of breath / dyspnea or wheezing       Lidocaine & Menthol-Methyl Sal 5 & 3-10 % THPK Externally apply topically 3 times daily as needed       lisinopril (PRINIVIL,ZESTRIL) 30 MG tablet TAKE 1 TAB BY MOUTH ONCE DAILY FOR HYPERTENSION/HIGH BLOOD PRESSURE 30 tablet 3     MAGNESIUM OXIDE PO Take 250 mg by mouth daily       memantine (NAMENDA) 5 MG tablet TAKE 1 TAB BY MOUTH ONCE DAILY FOR DEMENTIA 30 tablet 3     Menthol, Topical Analgesic, (BIOFREEZE) 4 % GEL Externally apply topically 4 times daily as needed       nystatin (MYCOSTATIN) 356095 UNIT/GM POWD Apply topically 2 times daily as needed        pantoprazole (PROTONIX) 40 MG EC tablet TAKE 1 TAB BY MOUTH ONCE DAILY BEFORE BREAKFAST FOR GERD 30 tablet 3     polyethylene glycol (MIRALAX/GLYCOLAX) Packet Take 17 g by mouth daily as needed for constipation       polyvinyl alcohol (LIQUIFILM TEARS) 1.4 % ophthalmic solution Place 1 drop into both eyes 2 times daily And 4 times a day PRN       senna-docusate (SENOKOT-S;PERICOLACE) 8.6-50 MG per tablet Take 1 tablet by mouth 2 times daily as needed        umeclidinium-vilanterol (ANORO  ELLIPTA) 62.5-25 MCG/INH oral inhaler Inhale 1 puff into the lungs daily         Post Discharge Medication Reconciliation Status: medication reconcilation previously completed during another office visit.    ROS:  10 point ROS neg other than the symptoms noted above in the HPI    PHYSICAL EXAM:  /73  Pulse 64  Temp 97.4  F (36.3  C)  Resp 18  Wt 204 lb 9.6 oz (92.8 kg)  SpO2 94%  BMI 36.24 kg/m2   Gen: sitting in wheelchair, alert, cooperative and in no acute distress  HEENT: normocephalic; oropharynx clear  Card: RRR, S1, S2, no murmurs  Resp: lungs clear to auscultation bilaterally, no crackles or wheezes  GI: abdomen soft, not-tender  MSK: normal muscle tone, 1+ RLE edema; LLE in splint to knee  Neuro: CX II-XII grossly in tact; ROM in all four extremities grossly in tact  Psych: alert and oriented to self and general situation; normal affect    LABORATORY/IMAGING DATA:  Reviewed as per Epic    ASSESSMENT/PLAN:    Left 2nd-5th Metatarsal Fractures  Secondary to a fall. Underlying osteoporosis. Podiatry recommended conservative management.   -- NWB with splint to L foot  -- analgesia with APAP 650 mg q6h, Norco 5-325 mg 1-2 tabs q4h PRN  -- follow up with podiatry as scheduled    Osteoporosis  New on bisphosphonate.   -- continues on Ca+D  -- will start bisphosphonate after TCU stay due to cost    CAD / CHF / HTN / HLD  SBPs 120s-150s Weights stable in 202-204 lb range. Appears overall euvolemic on exam.   -- continues on ASA 81 mg daily, atorvastatin 40 mg qhs, furosemide 40 mg daily, Imdur 30 mg daily, isradipine 5 mg BID, lisinopril 30 mg daily   -- follow BPs, weights, clinical volume status    Bilateral LE Edema  Reports this is chronic. Is on furosemide already. Weights may be up 2-4 lbs from baseline. She does not want to increase her furosemide. Does spend most of her day with leg in dependent position  -- facility is ordering a new recliner today to help her with ease of use to get her legs  up  -- continue compression to RLE    Chronic Anemia  Unknown etiology. Baseline Hgb mid 9s.   -- follow clinically     Dementia Without Behavioral Disturbance  Lives in AL facility. Today was a good historian.   -- OT following for cog assessment  -- continues on memantine 5 mg daily     Depression / Anxiety  Mood and spirits good today  -- continues on citalopram 10 mg daily  -- supportive cares    CVA  By history.   -- continues on ASA 81 mg daily and atorvastatin 40 mg qhs    COPD  No signs of exacerbation. Lungs clear today.   -- continues on fluticasone-vilanterol 100-25 mcg daily, umeclidinium-vilanterol 62.5-25 mcg daily and levalbuterol nebs  Peripheral Neuropathy  -- continues on gabapentin 300 mg BID    CKD, Stage III  Baseline Cr 0.9-1.2.   -- avoid nephrotoxic meds  -- periodic BMP    GERD  -- continues on famotidine 20 mg BID and pantoprazole 40 mg daily    Fall  Physical Deconditioning  In setting of hospitalization and underlying medical conditions  -- ongoing PT/OT      Electronically signed by:  Ana Cristina Yu MD

## 2018-06-11 ENCOUNTER — NURSING HOME VISIT (OUTPATIENT)
Dept: GERIATRICS | Facility: CLINIC | Age: 83
End: 2018-06-11
Payer: COMMERCIAL

## 2018-06-11 VITALS
BODY MASS INDEX: 36.24 KG/M2 | SYSTOLIC BLOOD PRESSURE: 133 MMHG | DIASTOLIC BLOOD PRESSURE: 73 MMHG | OXYGEN SATURATION: 94 % | WEIGHT: 204.6 LBS | RESPIRATION RATE: 18 BRPM | HEART RATE: 64 BPM | TEMPERATURE: 97.4 F

## 2018-06-11 DIAGNOSIS — F03.90 DEMENTIA WITHOUT BEHAVIORAL DISTURBANCE, UNSPECIFIED DEMENTIA TYPE: ICD-10-CM

## 2018-06-11 DIAGNOSIS — N18.30 CKD (CHRONIC KIDNEY DISEASE) STAGE 3, GFR 30-59 ML/MIN (H): ICD-10-CM

## 2018-06-11 DIAGNOSIS — M80.00XD OSTEOPOROSIS WITH CURRENT PATHOLOGICAL FRACTURE WITH ROUTINE HEALING, UNSPECIFIED OSTEOPOROSIS TYPE, SUBSEQUENT ENCOUNTER: ICD-10-CM

## 2018-06-11 DIAGNOSIS — I25.10 CORONARY ARTERY DISEASE INVOLVING NATIVE CORONARY ARTERY OF NATIVE HEART WITHOUT ANGINA PECTORIS: ICD-10-CM

## 2018-06-11 DIAGNOSIS — S92.302D: Primary | ICD-10-CM

## 2018-06-11 DIAGNOSIS — R53.81 PHYSICAL DECONDITIONING: ICD-10-CM

## 2018-06-11 DIAGNOSIS — I50.9 CHRONIC CONGESTIVE HEART FAILURE, UNSPECIFIED CONGESTIVE HEART FAILURE TYPE: ICD-10-CM
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
## 2018-06-11 DIAGNOSIS — I10 BENIGN ESSENTIAL HYPERTENSION: ICD-10-CM

## 2018-06-11 DIAGNOSIS — J44.9 CHRONIC OBSTRUCTIVE PULMONARY DISEASE, UNSPECIFIED COPD TYPE (H): ICD-10-CM

## 2018-06-11 DIAGNOSIS — E78.5 HYPERLIPIDEMIA, UNSPECIFIED HYPERLIPIDEMIA TYPE: ICD-10-CM

## 2018-06-11 DIAGNOSIS — D63.8 ANEMIA IN OTHER CHRONIC DISEASES CLASSIFIED ELSEWHERE: ICD-10-CM

## 2018-06-11 DIAGNOSIS — K21.9 GASTROESOPHAGEAL REFLUX DISEASE WITHOUT ESOPHAGITIS: ICD-10-CM

## 2018-06-11 DIAGNOSIS — R60.0 BILATERAL LEG EDEMA: ICD-10-CM

## 2018-06-11 DIAGNOSIS — F41.8 DEPRESSION WITH ANXIETY: ICD-10-CM

## 2018-06-11 DIAGNOSIS — Z86.73 H/O: CVA (CEREBROVASCULAR ACCIDENT): ICD-10-CM

## 2018-06-11 PROCEDURE — 99306 1ST NF CARE HIGH MDM 50: CPT | Performed by: INTERNAL MEDICINE

## 2018-06-11 RX ORDER — MENTHOL AND ZINC OXIDE .44; 20.625 G/100G; G/100G
OINTMENT TOPICAL 2 TIMES DAILY PRN
COMMUNITY
End: 2018-11-20

## 2018-06-11 RX ORDER — FLUTICASONE PROPIONATE 50 MCG
1 SPRAY, SUSPENSION (ML) NASAL 2 TIMES DAILY PRN
COMMUNITY
End: 2019-01-25

## 2018-06-12 VITALS
RESPIRATION RATE: 18 BRPM | SYSTOLIC BLOOD PRESSURE: 147 MMHG | HEART RATE: 56 BPM | DIASTOLIC BLOOD PRESSURE: 65 MMHG | OXYGEN SATURATION: 93 % | WEIGHT: 206 LBS | TEMPERATURE: 97.7 F | BODY MASS INDEX: 36.49 KG/M2

## 2018-06-12 NOTE — PROGRESS NOTES
"[place] [facility]    Chief Complaint   Patient presents with     MADALYNROXI       [facility] [place] Medical Record Number:  [id number]    HPI:    Jazmin Clifton is a 85 year old  (12/30/1932), who is being seen today for an episodic care visit at [facility].  HPI information obtained from: facility chart records, facility staff, patient report and [facility] chart review.        Today's concern is:  Excessive cerumen in ear canal, bilateral  C/o pressure in bilat ears, Right >left, and some decreased hearing. Debrox placed to right ear BID x 3 days. She would like wax removed from her ears.     Chronic congestive heart failure, unspecified congestive heart failure type (H)  Bilateral leg edema  CKD  Has BLE edema, staff have noted increase in weight. She does have some SOB, which she feels baseline. She is on lasix 40mg daily. Lisinopril 30mg daily, and isradipine 5mg BID. Creatinine checked on  6/4 was elevated on 6/4 at 1.38, baseline, baseline.  0.9-1.2. BP mildly elevated at times. Last 3 BPs: 147/65, 137/73, 159/69  Admission Weight: 201.4lbs  Current Weight: 207.6lbs  Blood Sugar Range:   N/A    COPD  Was started on mucinex BID earlier this week for cough, especially, at night. Felt she was unable to \"cough stuff up.\" She does feel she is coughing less. She is on fluticasone-vilanterol 100-25 mcg daily, umeclidinium-vilanterol 62.5-25 mcg daily and levalbuterol nebs. She does report some mild SOB, but feels this baseline.       ALLERGIES: Accupril; Ace inhibitors; Augmentin; Levofloxacin; Morphine; and Norvasc [amlodipine besylate]  Past Medical, Surgical, Family and Social History reviewed and updated in [facility].    Current Outpatient Prescriptions   Medication Sig Dispense Refill     ACETAMINOPHEN PO Take 650 mg by mouth every 6 hours       aspirin 81 MG chewable tablet Take 81 mg by mouth       atorvastatin (LIPITOR) 40 MG tablet TAKE 1 TAB BY MOUTH AT BEDTIME FOR HIGH CHOLESTEROL 30 tablet 3     " bisacodyl (DULCOLAX) 10 MG Suppository Place 10 mg rectally daily as needed for constipation       Calcium-Vitamin D 600-200 MG-UNIT TABS Take 1 tablet by mouth daily       CITALOPRAM HYDROBROMIDE PO Take 10 mg by mouth daily       cyanocobalamin (VITAMIN  B-12) 1000 MCG tablet Take 1,000 mcg by mouth daily       CYCLOBENZAPRINE HCL PO Take 5 mg by mouth 3 times daily as needed for muscle spasms       ESTRIOL 1MG/GRAM CREAM Apply 1 g topically See Admin Instructions Insert 1 application vaginally at bedtime every Mon, Wed, Fri for vaginal dryness, burning, itching apply pea size amount vaginally at bedtime three times a week       FAMOTIDINE PO Take 20 mg by mouth 2 times daily       fluticasone (FLONASE) 50 MCG/ACT spray Spray 1 spray into both nostrils 2 times daily       fluticasone-vilanterol (BREO ELLIPTA) 100-25 MCG/INH oral inhaler Inhale 1 puff into the lungs daily       Furosemide (LASIX PO) Take 40 mg by mouth daily        GABAPENTIN PO Take 300 mg by mouth 2 times daily       guaiFENesin (ROBITUSSIN) 100 MG/5ML SYRP Take 5 mLs by mouth every 4 hours as needed for cough       HYDROcodone-acetaminophen (NORCO) 5-325 MG per tablet Take 1-2 tablets by mouth See Admin Instructions Give 1 tablet by mouth every 4 hours as needed for moderate to severe pain 1 tab for pain 1-5/10 AND Give 2 tablet by mouth every 4 hours as needed for moderate to severe pain 2 tabs for pain 6-10/10       isosorbide mononitrate (IMDUR) 30 MG 24 hr tablet TAKE 1 TAB BY MOUTH ONCE DAILY FOR HYPERTENSION/HIGH BLOOD PRESSURE 30 tablet 3     isradipine (DYNACIRC) 5 MG capsule Take 5 mg by mouth 2 times daily       levalbuterol (XOPENEX) 1.25 MG/3ML neb solution Take 1 ampule by nebulization every 4 hours as needed for shortness of breath / dyspnea or wheezing       Lidocaine & Menthol-Methyl Sal 5 & 3-10 % THPK Externally apply topically 3 times daily as needed       lisinopril (PRINIVIL,ZESTRIL) 30 MG tablet TAKE 1 TAB BY MOUTH ONCE  DAILY FOR HYPERTENSION/HIGH BLOOD PRESSURE 30 tablet 3     MAGNESIUM OXIDE PO Take 250 mg by mouth daily       memantine (NAMENDA) 5 MG tablet TAKE 1 TAB BY MOUTH ONCE DAILY FOR DEMENTIA 30 tablet 3     Menthol, Topical Analgesic, (BIOFREEZE) 4 % GEL Externally apply topically 4 times daily as needed       menthol-zinc oxide (CALMOSEPTINE) 0.44-20.625 % OINT ointment Apply topically 2 times daily as needed for skin protection       nystatin (MYCOSTATIN) 663717 UNIT/GM POWD Apply topically 2 times daily as needed        pantoprazole (PROTONIX) 40 MG EC tablet TAKE 1 TAB BY MOUTH ONCE DAILY BEFORE BREAKFAST FOR GERD 30 tablet 3     polyethylene glycol (MIRALAX/GLYCOLAX) Packet Take 17 g by mouth daily as needed for constipation       polyvinyl alcohol (LIQUIFILM TEARS) 1.4 % ophthalmic solution Place 1 drop into both eyes 2 times daily And 4 times a day PRN       senna-docusate (SENOKOT-S;PERICOLACE) 8.6-50 MG per tablet Take 1 tablet by mouth 2 times daily as needed        SIMETHICONE-80 PO Take 80 mg by mouth 4 times daily as needed for intestinal gas       umeclidinium-vilanterol (ANORO ELLIPTA) 62.5-25 MCG/INH oral inhaler Inhale 1 puff into the lungs daily       Medications reviewed:  Medications reconciled to facility chart and changes were made to reflect current medications as identified as above med list. Below are the changes that were made:   Medications stopped since last EPIC medication reconciliation:   There are no discontinued medications.    Medications started since last [facility] medication reconciliation:  No orders of the defined types were placed in this encounter.        REVIEW OF SYSTEMS:  4 point ROS including Respiratory, CV, GI and , other than that noted in the HPI,  is negative    Physical Exam:  /65  Pulse 56  Temp 97.7  F (36.5  C)  Resp 18  Wt 206 lb (93.4 kg)  SpO2 93%  BMI 36.49 kg/m2  GENERAL APPEARANCE:  Alert, in no distress, cooperative  ENT:  Mouth and posterior  oropharynx normal, moist mucous membranes, ear canal:moderate cerumen, cerumen removed with manual debridement, canal shows mild erythema after removal of wax, normal TM noted after wax removal, ear symptoms resolved after cerumen removal  RESP:  respiratory effort and palpation of chest normal, lungs clear to auscultation , diminished breath sounds bilat bases, crackles LLL, on RA  CV:  Palpation and auscultation of heart done , regular rate and rhythm, no murmur, rub, or gallop, peripheral edema 2-3+ in RLE, difficult to assess LLE d/t splint in place  ABDOMEN:  normal bowel sounds, soft, nontender, no hepatosplenomegaly or other masses, no guarding or rebound  M/S:   Gait and station abnormal NWB to LLE, splint in place,   NEURO:   Cranial nerves 2-12 are normal tested and grossly at patient's baseline  PSYCH:  oriented X 3, memory impaired , affect and mood normal    Recent Labs:     CBC RESULTS:   Recent Labs   Lab Test  06/04/18   0850  09/11/17   0650   WBC  5.3  4.4   RBC  3.70*  3.53*   HGB  9.7*  9.8*   HCT  32.7*  31.5*   MCV  88  89   MCH  26.2*  27.8   MCHC  29.7*  31.1*   RDW  15.9*  15.7*   PLT  172  173       Last Basic Metabolic Panel:  Recent Labs   Lab Test  06/04/18   0850  09/11/17   0650   NA  142  144   POTASSIUM  4.2  4.3   CHLORIDE  107  109   BLAIR  9.2  9.4   CO2  29  28   BUN  54*  34*   CR  1.38*  1.09*   GLC  100*  82       Liver Function Studies - No results for input(s): PROTTOTAL, ALBUMIN, BILITOTAL, ALKPHOS, AST, ALT, BILIDIRECT in the last 31462 hours.    TSH   Date Value Ref Range Status   08/14/2017 0.62 0.40 - 4.00 mU/L Final   04/18/2007 0.60 0.4 - 5.0 mU/L Final       Lab Results   Component Value Date    A1C 6.2 08/31/2017         Assessment/Plan:  (H61.23) Excessive cerumen in ear canal, bilateral  (primary encounter diagnosis)  Cerumen present and patient symptomatic with decreased hearing and pressure in ear. I used an otoscope and curette to remove the excessive cerumen in  both ears. This was a lengthy procedure. Did have some residual cerumen close to tympanic membrane on left side which provider did not feel safe removing, will apply debrox to left ear, monitor for impaction    (I50.9) Chronic congestive heart failure, unspecified congestive heart failure type (H)  (R60.0) Bilateral leg edema  Comment: hyeprvolemic, some crackles heard in lungs, weight up  Plan: increase lasix x 2 days as below, monitor daily weights. Other medications as above    (J44.9) Chronic obstructive pulmonary disease, unspecified COPD type (H)  Comment: chronic, no s/s of exacerbation, does have chronic cough  Plan: continue mucinex BID, other medications as above, monitor VS and adjust PRN    (N18.3) CKD (chronic kidney disease) stage 3, GFR 30-59 ml/min  Comment: slightly elevated on previous check  Plan: BMP on 6/18 as he is receiving extra lasix this week.     Orders:  1. Debrox Ear wax removal drops.   -5 drops to left ear BID x 5 days Dx: Cerumen impaction  2. D/C Famotidine  3. Increase lasix to 40 mg PO in am and 20 mg PO after noon x 2 days then resume t0 mg PO daily Dx: CHF  4. BMP on 6/18 Dx: CKD, CHF        Electronically signed by  MARY Gómez CNP

## 2018-06-13 ENCOUNTER — NURSING HOME VISIT (OUTPATIENT)
Dept: GERIATRICS | Facility: CLINIC | Age: 83
End: 2018-06-13
Payer: COMMERCIAL

## 2018-06-13 DIAGNOSIS — N18.30 CKD (CHRONIC KIDNEY DISEASE) STAGE 3, GFR 30-59 ML/MIN (H): ICD-10-CM

## 2018-06-13 DIAGNOSIS — R60.0 BILATERAL LEG EDEMA: ICD-10-CM

## 2018-06-13 DIAGNOSIS — I50.9 CHRONIC CONGESTIVE HEART FAILURE, UNSPECIFIED CONGESTIVE HEART FAILURE TYPE: ICD-10-CM

## 2018-06-13 DIAGNOSIS — H61.23 EXCESSIVE CERUMEN IN EAR CANAL, BILATERAL: Primary | ICD-10-CM

## 2018-06-13 DIAGNOSIS — J44.9 CHRONIC OBSTRUCTIVE PULMONARY DISEASE, UNSPECIFIED COPD TYPE (H): ICD-10-CM

## 2018-06-13 PROCEDURE — 69210 REMOVE IMPACTED EAR WAX UNI: CPT | Mod: 50 | Performed by: NURSE PRACTITIONER

## 2018-06-13 PROCEDURE — 99309 SBSQ NF CARE MODERATE MDM 30: CPT | Mod: 25 | Performed by: NURSE PRACTITIONER

## 2018-06-18 ENCOUNTER — HOSPITAL LABORATORY (OUTPATIENT)
Facility: OTHER | Age: 83
End: 2018-06-18

## 2018-06-18 ENCOUNTER — NURSING HOME VISIT (OUTPATIENT)
Dept: GERIATRICS | Facility: CLINIC | Age: 83
End: 2018-06-18
Payer: COMMERCIAL

## 2018-06-18 VITALS
RESPIRATION RATE: 18 BRPM | OXYGEN SATURATION: 92 % | HEART RATE: 59 BPM | DIASTOLIC BLOOD PRESSURE: 56 MMHG | TEMPERATURE: 97.6 F | WEIGHT: 207 LBS | SYSTOLIC BLOOD PRESSURE: 126 MMHG | BODY MASS INDEX: 36.67 KG/M2

## 2018-06-18 DIAGNOSIS — I50.9 CHRONIC CONGESTIVE HEART FAILURE, UNSPECIFIED CONGESTIVE HEART FAILURE TYPE: Primary | ICD-10-CM

## 2018-06-18 DIAGNOSIS — N18.30 CKD (CHRONIC KIDNEY DISEASE) STAGE 3, GFR 30-59 ML/MIN (H): ICD-10-CM

## 2018-06-18 DIAGNOSIS — S92.302D: ICD-10-CM

## 2018-06-18 DIAGNOSIS — G62.9 NEUROPATHY: ICD-10-CM

## 2018-06-18 LAB
ANION GAP SERPL CALCULATED.3IONS-SCNC: 7 MMOL/L (ref 3–14)
BUN SERPL-MCNC: 51 MG/DL (ref 7–30)
CALCIUM SERPL-MCNC: 9.3 MG/DL (ref 8.5–10.1)
CHLORIDE SERPL-SCNC: 106 MMOL/L (ref 94–109)
CO2 SERPL-SCNC: 32 MMOL/L (ref 20–32)
CREAT SERPL-MCNC: 1.58 MG/DL (ref 0.52–1.04)
GFR SERPL CREATININE-BSD FRML MDRD: 31 ML/MIN/1.7M2
GLUCOSE SERPL-MCNC: 88 MG/DL (ref 70–99)
MAGNESIUM SERPL-MCNC: 2.6 MG/DL (ref 1.6–2.3)
POTASSIUM SERPL-SCNC: 4.3 MMOL/L (ref 3.4–5.3)
SODIUM SERPL-SCNC: 145 MMOL/L (ref 133–144)
VIT B12 SERPL-MCNC: 2125 PG/ML (ref 193–986)

## 2018-06-18 PROCEDURE — 99309 SBSQ NF CARE MODERATE MDM 30: CPT | Performed by: NURSE PRACTITIONER

## 2018-06-18 RX ORDER — GUAIFENESIN 600 MG/1
600 TABLET, EXTENDED RELEASE ORAL 2 TIMES DAILY
COMMUNITY
End: 2018-11-20

## 2018-06-18 NOTE — PROGRESS NOTES
[facility]    Chief Complaint   Patient presents with     RECHECK       HPI:    Jazmin Clifton is a 85 year old  (12/30/1932), who is being seen today for an episodic care visit at [facility].  HPI information obtained from: facility chart records, facility staff, patient report and [facility] chart review.     Today's concern is:  Chronic congestive heart failure, unspecified congestive heart failure type (H)  nsg asking me to see pt today as wt cont to climb to 207 despite increase in diuretics. On lasix 40 daily, but has had added 20 in pm x 4 days with increase in wt.   Noted + arline ronchi in lungs.   Pt denies sob or CP, she notes + edema and tough for to determine if worse or not given just the r leg as L is elevated/splinted    CKD (chronic kidney disease) stage 3, GFR 30-59 ml/min  Noted with BMP back today    Closed traumatic nondisplaced fracture of metatarsal bone of left foot with routine healing, subsequent encounter  Pain controlled. Working with therapy. Foot currently elevated and wrapped.     Neuropathy  On josef - pt denies neuropathy now      PMH/PSH reviewed in EPIC today        REVIEW OF SYSTEMS:  4 point ROS including Respiratory, CV, GI and , other than that noted in the HPI,  is negative    /56  Pulse 59  Temp 97.6  F (36.4  C)  Resp 18  Wt 207 lb (93.9 kg)  SpO2 92%  BMI 36.67 kg/m2    GENERAL APPEARANCE:  Alert, in no distress  RESP:  respiratory effort and palpation of chest normal, auscultation of lungs + arline lower bases ronchi , no respiratory distress  CV:  Palpation and auscultation of heart done , rate and rhythm reg, no murmur, +2 in R peripheral edema, L difficult to asses given splint  ABDOMEN:  normal bowel sounds, soft, nontender, no hepatosplenomegaly or other masses  M/S:   L foot splinted, normal tone in muscle,   SKIN:  Inspection and Palpation of skin and subcutaneous tissue intact  NEURO: 2-12 in normal limits and at patient's  baseline  PSYCH:  insight and judgement, memory fair , affect and mood normal      GFR Estimate   Date Value Ref Range Status   06/18/2018 31 (L) >60 mL/min/1.7m2 Final     Comment:     Non  GFR Calc   06/04/2018 36 (L) >60 mL/min/1.7m2 Final     Comment:     Non  GFR Calc   09/11/2017 48 (L) >60 mL/min/1.7m2 Final     Comment:     Non  GFR Calc     Potassium   Date Value Ref Range Status   06/18/2018 4.3 3.4 - 5.3 mmol/L Final   ]  ASSESSMENT/PLAN:  Chronic congestive heart failure, unspecified congestive heart failure type (H)/CKD (chronic kidney disease) stage 3, GFR 30-59 ml/min  Overloaded - will push further on diuretics but all aldactone over just loops - noted + hx of aldactone. Will also push loops given no improvement of last/current push   Noted CKD with risk of elevated K as on ACE - will recheck BMP    Closed traumatic nondisplaced fracture of metatarsal bone of left foot with routine healing, subsequent encounter  Healing - cont poc    Neuropathy  Noted josef can also add to fluid retention. Given no current neuropathy and no recent trial reduction - will try as she might tolerated lower dose.   F/u on Friday - if stable will cont to decrease med      Orders:  1. Decrease gabapentin to 100 mg PO every am and 300 mg PO at HS Dx: HF  2. Start Spironolactone 25 mg PO Q am Dx: CHF  3. Change Lasix to 40 mg PO BID x 2 days then 40 mg Q day Dx: HF  4. BMP, Albumin, Hgb on 6/25  Dx: CHF    Pt agreeable to above poc.     Electronically signed by:  MARY Barajas CNP

## 2018-06-19 PROBLEM — E66.01 MORBID OBESITY (H): Status: ACTIVE | Noted: 2018-06-19

## 2018-06-21 VITALS
WEIGHT: 204.6 LBS | BODY MASS INDEX: 36.24 KG/M2 | DIASTOLIC BLOOD PRESSURE: 59 MMHG | OXYGEN SATURATION: 93 % | RESPIRATION RATE: 18 BRPM | HEART RATE: 64 BPM | SYSTOLIC BLOOD PRESSURE: 113 MMHG | TEMPERATURE: 98.4 F

## 2018-06-21 NOTE — PROGRESS NOTES
[place] [facility]    Chief Complaint   Patient presents with     Nursing Home Acute       HPI:    Jazmin Clifton is a 85 year old  (12/30/1932), who is being seen today for an episodic care visit at [facility]. HPI information obtained from: facility chart records, facility staff, patient report and [facility] chart review.     Today's concern is:  Seeing pt today as f/u on 6/18 visit. Pt is here rehabbing with non displaced fracture of metatarsal bones in L foot and doing well Other issues that are impeding her rehab are edema in arline lower ext 2/2 CHF and partly obesity - balancing need for diuretics with HR with CKD. Last visit added spirolactone and pushed on lasix further with recheck on labs on Monday given CKD and hx of renal dx/atherosclerosis of renal artery    Also decreased on josef to see if that is needed as can be exacerbating fluid retention.   F/U Today pt stating she feels like she has less fluid and noted wt is down. Does not have any increase in pain with prior josef reduction. She notes it was to treat back pain.     1. Neuropathy    2. Chronic congestive heart failure, unspecified congestive heart failure type (H)    3. CKD (chronic kidney disease) stage 3, GFR 30-59 ml/min    4. Morbid obesity (H)    5. Atherosclerosis of renal artery (H)      PMH/PSH reviewed in EPIC today    REVIEW OF SYSTEMS:  4 point ROS including Respiratory, CV, GI and , other than that noted in the HPI,  is negative    /59  Pulse 64  Temp 98.4  F (36.9  C)  Resp 18  Wt 204 lb 9.6 oz (92.8 kg)  SpO2 93%  BMI 36.24 kg/m2  GENERAL APPEARANCE:  Alert, in no distress, obese  REsp: crackles arline in lower lobes that Clears after cough and deep breathing, non labored  CV: HRRR , +2 arline lower ext edema - does appear less than monday       GFR Estimate   Date Value Ref Range Status   06/18/2018 31 (L) >60 mL/min/1.7m2 Final     Comment:     Non  GFR Calc   06/04/2018 36 (L) >60  mL/min/1.7m2 Final     Comment:     Non  GFR Calc   09/11/2017 48 (L) >60 mL/min/1.7m2 Final     Comment:     Non  GFR Calc       ASSESSMENT/PLAN:  Chronic congestive heart failure, unspecified congestive heart failure type (H) and CKD (chronic kidney disease) stage 3, GFR 30-59 ml/min and Morbid obesity (H) andAtherosclerosis of renal artery (H)  Balancing renal dx with need to diuresis after increase in fluid as evidenced by wt and edema in legs. Noted both also affected by adipose tissue 2/2 her obesity.   Will cont current diuretics and recheck albs Monday     Neuropathy  Stable with last josef reduction - will therefore decrease further as again may be contributing to fluid retention and no ill effect with last dose reduction indicating lower need for med.       Orders:  1. Decrease Gabapentin to 200 mg PO Q HS Dx: Neurapathy  -D/C Am dose    Discussed medication changes with pt and she agrees to poc.     Electronically signed by:  MARY Barajas CNP

## 2018-06-22 ENCOUNTER — NURSING HOME VISIT (OUTPATIENT)
Dept: GERIATRICS | Facility: CLINIC | Age: 83
End: 2018-06-22
Payer: COMMERCIAL

## 2018-06-22 DIAGNOSIS — I70.1 ATHEROSCLEROSIS OF RENAL ARTERY (H): ICD-10-CM

## 2018-06-22 DIAGNOSIS — E66.01 MORBID OBESITY (H): ICD-10-CM

## 2018-06-22 DIAGNOSIS — G62.9 NEUROPATHY: Primary | ICD-10-CM

## 2018-06-22 DIAGNOSIS — N18.30 CKD (CHRONIC KIDNEY DISEASE) STAGE 3, GFR 30-59 ML/MIN (H): ICD-10-CM

## 2018-06-22 DIAGNOSIS — I50.9 CHRONIC CONGESTIVE HEART FAILURE, UNSPECIFIED CONGESTIVE HEART FAILURE TYPE: ICD-10-CM

## 2018-06-22 PROCEDURE — 99309 SBSQ NF CARE MODERATE MDM 30: CPT | Performed by: NURSE PRACTITIONER

## 2018-06-22 NOTE — LETTER
6/22/2018        RE: [facility]  [address] [address]        [place] [facility]    Chief Complaint   Patient presents with     Nursing Home Acute       HPI:    Jazmin Clifton is a 85 year old  (12/30/1932), who is being seen today for an episodic care visit at [facility]. HPI information obtained from: facility chart records, facility staff, patient report and [facility] chart review.     Today's concern is:  Seeing pt today as f/u on 6/18 visit. Pt is here rehabbing with non displaced fracture of metatarsal bones in L foot and doing well Other issues that are impeding her rehab are edema in arline lower ext 2/2 CHF and partly obesity - balancing need for diuretics with HR with CKD. Last visit added spirolactone and pushed on lasix further with recheck on labs on Monday given CKD and hx of renal dx/atherosclerosis of renal artery    Also decreased on josef to see if that is needed as can be exacerbating fluid retention.   F/U Today pt stating she feels like she has less fluid and noted wt is down. Does not have any increase in pain with prior josef reduction. She notes it was to treat back pain.     1. Neuropathy    2. Chronic congestive heart failure, unspecified congestive heart failure type (H)    3. CKD (chronic kidney disease) stage 3, GFR 30-59 ml/min    4. Morbid obesity (H)    5. Atherosclerosis of renal artery (H)      PMH/PSH reviewed in EPIC today    REVIEW OF SYSTEMS:  4 point ROS including Respiratory, CV, GI and , other than that noted in the HPI,  is negative    /59  Pulse 64  Temp 98.4  F (36.9  C)  Resp 18  Wt 204 lb 9.6 oz (92.8 kg)  SpO2 93%  BMI 36.24 kg/m2  GENERAL APPEARANCE:  Alert, in no distress, obese  REsp: crackles arline in lower lobes that Clears after cough and deep breathing, non labored  CV: HRRR , +2 arline lower ext edema - does appear less than monday       GFR Estimate   Date Value Ref Range Status    06/18/2018 31 (L) >60 mL/min/1.7m2 Final     Comment:     Non  GFR Calc   06/04/2018 36 (L) >60 mL/min/1.7m2 Final     Comment:     Non  GFR Calc   09/11/2017 48 (L) >60 mL/min/1.7m2 Final     Comment:     Non  GFR Calc       ASSESSMENT/PLAN:  Chronic congestive heart failure, unspecified congestive heart failure type (H) and CKD (chronic kidney disease) stage 3, GFR 30-59 ml/min and Morbid obesity (H) andAtherosclerosis of renal artery (H)  Balancing renal dx with need to diuresis after increase in fluid as evidenced by wt and edema in legs. Noted both also affected by adipose tissue 2/2 her obesity.   Will cont current diuretics and recheck albs Monday     Neuropathy  Stable with last josef reduction - will therefore decrease further as again may be contributing to fluid retention and no ill effect with last dose reduction indicating lower need for med.       Orders:  1. Decrease Gabapentin to 200 mg PO Q HS Dx: Neurapathy  -D/C Am dose    Discussed medication changes with pt and she agrees to poc.     Electronically signed by:  MARY Barajas CNP      Sincerely,        MARY Barajas CNP

## 2018-06-25 ENCOUNTER — HOSPITAL LABORATORY (OUTPATIENT)
Facility: OTHER | Age: 83
End: 2018-06-25

## 2018-06-25 LAB
ALBUMIN SERPL-MCNC: 3.5 G/DL (ref 3.4–5)
ANION GAP SERPL CALCULATED.3IONS-SCNC: 6 MMOL/L (ref 3–14)
BUN SERPL-MCNC: 46 MG/DL (ref 7–30)
CALCIUM SERPL-MCNC: 9.2 MG/DL (ref 8.5–10.1)
CHLORIDE SERPL-SCNC: 108 MMOL/L (ref 94–109)
CO2 SERPL-SCNC: 30 MMOL/L (ref 20–32)
CREAT SERPL-MCNC: 1.36 MG/DL (ref 0.52–1.04)
GFR SERPL CREATININE-BSD FRML MDRD: 37 ML/MIN/1.7M2
GLUCOSE SERPL-MCNC: 99 MG/DL (ref 70–99)
POTASSIUM SERPL-SCNC: 4.8 MMOL/L (ref 3.4–5.3)
SODIUM SERPL-SCNC: 144 MMOL/L (ref 133–144)

## 2018-06-26 ENCOUNTER — DISCHARGE SUMMARY NURSING HOME (OUTPATIENT)
Dept: GERIATRICS | Facility: CLINIC | Age: 83
End: 2018-06-26
Payer: COMMERCIAL

## 2018-06-26 VITALS
RESPIRATION RATE: 20 BRPM | BODY MASS INDEX: 35.78 KG/M2 | SYSTOLIC BLOOD PRESSURE: 148 MMHG | WEIGHT: 202 LBS | DIASTOLIC BLOOD PRESSURE: 73 MMHG | OXYGEN SATURATION: 94 % | TEMPERATURE: 98.3 F | HEART RATE: 55 BPM

## 2018-06-26 DIAGNOSIS — Z86.73 H/O: CVA (CEREBROVASCULAR ACCIDENT): ICD-10-CM

## 2018-06-26 DIAGNOSIS — R53.81 PHYSICAL DECONDITIONING: ICD-10-CM

## 2018-06-26 DIAGNOSIS — I50.9 CHRONIC CONGESTIVE HEART FAILURE, UNSPECIFIED CONGESTIVE HEART FAILURE TYPE: ICD-10-CM

## 2018-06-26 DIAGNOSIS — I10 BENIGN ESSENTIAL HYPERTENSION: ICD-10-CM

## 2018-06-26 DIAGNOSIS — H61.23 EXCESSIVE CERUMEN IN EAR CANAL, BILATERAL: ICD-10-CM

## 2018-06-26 DIAGNOSIS — F03.90 DEMENTIA WITHOUT BEHAVIORAL DISTURBANCE, UNSPECIFIED DEMENTIA TYPE: ICD-10-CM

## 2018-06-26 DIAGNOSIS — S92.302D: Primary | ICD-10-CM

## 2018-06-26 DIAGNOSIS — K21.9 GASTROESOPHAGEAL REFLUX DISEASE WITHOUT ESOPHAGITIS: ICD-10-CM

## 2018-06-26 DIAGNOSIS — M80.00XD OSTEOPOROSIS WITH CURRENT PATHOLOGICAL FRACTURE WITH ROUTINE HEALING, UNSPECIFIED OSTEOPOROSIS TYPE, SUBSEQUENT ENCOUNTER: ICD-10-CM

## 2018-06-26 DIAGNOSIS — N18.30 CKD (CHRONIC KIDNEY DISEASE) STAGE 3, GFR 30-59 ML/MIN (H): ICD-10-CM

## 2018-06-26 DIAGNOSIS — I25.10 CORONARY ARTERY DISEASE INVOLVING NATIVE CORONARY ARTERY OF NATIVE HEART WITHOUT ANGINA PECTORIS: ICD-10-CM

## 2018-06-26 DIAGNOSIS — E78.5 HYPERLIPIDEMIA, UNSPECIFIED HYPERLIPIDEMIA TYPE: ICD-10-CM

## 2018-06-26 DIAGNOSIS — K59.01 SLOW TRANSIT CONSTIPATION: ICD-10-CM

## 2018-06-26 DIAGNOSIS — R60.0 BILATERAL LEG EDEMA: ICD-10-CM

## 2018-06-26 DIAGNOSIS — J44.9 CHRONIC OBSTRUCTIVE PULMONARY DISEASE, UNSPECIFIED COPD TYPE (H): ICD-10-CM

## 2018-06-26 DIAGNOSIS — F32.0 MILD MAJOR DEPRESSION (H): ICD-10-CM

## 2018-06-26 DIAGNOSIS — D63.8 ANEMIA IN OTHER CHRONIC DISEASES CLASSIFIED ELSEWHERE: ICD-10-CM

## 2018-06-26 PROCEDURE — 99316 NF DSCHRG MGMT 30 MIN+: CPT | Performed by: NURSE PRACTITIONER

## 2018-06-26 NOTE — PROGRESS NOTES
[place] [facility] DISCHARGE SUMMARY    PATIENT'S NAME: Jazmin Clifton  YOB: 1932  MEDICAL RECORD NUMBER:  [id number]    PRIMARY CARE PROVIDER AND CLINIC RESPONSIBLE AFTER TRANSFER: Roby Willis PHYSICIAN [facility] [address] [facility] [address]    CODE STATUS/ADVANCE DIRECTIVES DISCUSSION:   CPR/Full code        Allergies   Allergen Reactions     Accupril      Ace Inhibitors Unknown     Accupril     Augmentin Unknown     Levofloxacin Unknown     Macrobid [Nitrofurantoin]      Morphine Other (See Comments)     hallucinations     Norvasc [Amlodipine Besylate]      Leg swelling       Quinapril        TRANSFERRING PROVIDERS: MARY Gómez CNP, Ana Cristina Yu MD  DATE OF SNF ADMISSION:  May / 29 / 2018  DATE OF SNF (anticipated) DISCHARGE: June / 27 / 2018  DISCHARGE DISPOSITION: Assisted Living: [facility]   Nursing Facility: [facility] stay 5/28/18 to 5/29/18.     Condition on Discharge:  Improving.  Function:  Extensive assist with ADLs except for personal hygiene. Extensive assist with transfers. Nonambulatory. Transfers with slide board  Cognitive Scores: CPT 4.7/5.6    Equipment: wheelchair as not current ambulatory    DISCHARGE DIAGNOSIS:   1. Closed traumatic nondisplaced fracture of metatarsal bone of left foot with routine healing, subsequent encounter    2. Physical deconditioning    3. Chronic congestive heart failure, unspecified congestive heart failure type (H)    4. Bilateral leg edema    5. CKD (chronic kidney disease) stage 3, GFR 30-59 ml/min    6. Excessive cerumen in ear canal, bilateral    7. Chronic obstructive pulmonary disease, unspecified COPD type (H)    8. Osteoporosis with current pathological fracture with routine healing, unspecified osteoporosis type, subsequent encounter    9. Coronary artery disease involving native coronary artery of native heart without angina pectoris    10. Benign essential  hypertension    11. Hyperlipidemia, unspecified hyperlipidemia type    12. Anemia in other chronic diseases classified elsewhere    13. Dementia without behavioral disturbance, unspecified dementia type    14. H/O: CVA (cerebrovascular accident)    15. Gastroesophageal reflux disease without esophagitis    16. Slow transit constipation    17. Gastroesophageal reflux disease, esophagitis presence not specified    18. Mild major depression (H)        HPI Nursing Facility Course:  HPI information obtained from: facility chart records, facility staff, patient report and [facility] chart review.    From hospital discharge summary:    Jazmin Clifton is a 85 y.o. old female w/ PMH of hypertension, COPD, hyperlipidemia, dementia, CHF, brain aneurysm, stroke, GERD, CKD, and depression who was admitted after mechanical fall. Patient stated she tripped on her table, denies LOC or hitting her head. X-ray of the left foot nondisplaced transverse fracture through proximal shafts of the second, third, and fourth metatarsals with a probable nondisplaced fracture of the distal shaft of the luis metatarsal without dislocation. Discussed case with podiatry, Dr. Gibbs, who recommended immobilization and follow-up as outpatient, no need for acute surgical management. Fractrues 2/2 to osteoporosis with History of compression fractures and vertebroplasty. Creatinine clearance 42, discussed with patient and will initiate bisphosphonate at this time     Closed traumatic nondisplaced fracture of metatarsal bone of left foot with routine healing, subsequent encounter  Physical deconditioning  Remain NWB to LLE, was see by podiatry, splint removed and surgical boot place. Pain controlled on scheduled APAP, PRN norco, and PRN flexeril. . She worked with PT and OT, able to transfer with assist and slide board. She was accepted at [facility] which is better able to meet her needs. She will have home PT and OT at discharge.      Chronic  congestive heart failure, unspecified congestive heart failure type (H)  Bilateral leg edema  Had increased weight, so lasix increased from 40mg daily to 40mg q AM, 20mg q afternoon. Weight continued to trend up, she developed Rhonchi. Lasix increased to 40mg BID x2 days the return to 40mg daily and spironolactone 25mg daily was added. Weight trended down, now back to admission weight. Remains on lisinopril 30mg daily, and isradipine 5mg BID.   Last 3 BPs: 148/73, 127/71, 149/67  Admission Weight: 201.4lbs  Current Weight: 202lbs  Blood Sugar Range:   N/A    CKD (chronic kidney disease) stage 3, GFR 30-59 ml/min  Baseline 0.9-1.2, creatinine did increase to 1.58, but improved to 1.36 on recheck.     Excessive cerumen in ear canal, bilateral  C/o of pressure and decreased hearing in bilat ears. Debrox placed in bilat ears x 5 days then ear was manually removed. She reports she had decreased pressure and hearing improved.     Chronic obstructive pulmonary disease, unspecified COPD type (H)  Did have some hypoxia initially in TCU, requiring 2L NC, more so at night. She was 85% on RA at night, so will discharge on 2L NC at night. She remains on PTA Anoro Ellipta inhaler daily, cough syrup PRN, Breo Ellipta daily, and PRN levalbuterol nebs. No concerns of exacerbation during TCU stay.     Osteoporosis with current pathological fracture with routine healing, unspecified osteoporosis type, subsequent encounter  Fractures felt to be related to osteoporosis and osteopenia. She is on calcium-vitamin D supplement. Will start fosamax on discharge per recommendation of hospitalist (was not started in TCU d/t cost).     Coronary artery disease involving native coronary artery of native heart without angina pectoris  Benign essential hypertension  Hyperlipidemia, unspecified hyperlipidemia type  She is currently on ASA 81mg, lipitor, lasix 40mg daily, imdur 30mg, lisinopril 30mg daily. BP fairly well controlled t/o TCU stay.      Anemia in other chronic diseases classified elsewhere  Baseline Hgb mid 9's. She had been on B12 supplement, however she requested for number of medications to be reduced. B12 level checked and was elevated, so supplement discontinued, recommend further f/u with PCP.     Dementia without behavioral disturbance, unspecified dementia type  She is a good historian, moving into [facility] [facility] [employer]. She is on memantine 5mg daily.     H/O: CVA (cerebrovascular accident)  On ASA and lipitor, remained at neurological baseline.     Gastroesophageal reflux disease without esophagitis  Was weaned on pepcid as felt to redundant and patient was requesting medication reduction. She remains on protonix 40mg daily.     Slow transit constipation  Had issues with constipation, required suppository. Docusate discontinued and she was started on senna-s 1 tab BID and PRN miralax with improvement.     Mild major depression (H)  Remained on PTA citalopram 10mg daily, mood stable in TCU.       PAST MEDICAL HISTORY:  has a past medical history of ABDOMINAL PAIN GENERALIZED (3/15/2006); Abdominal pain, generalized (3/15/2006); Atherosclerosis of renal artery (H); BENIGN HYPERTENSION (5/1/2006); Benign neoplasm of scalp and skin of neck; Cerebral aneurysm, nonruptured; Depressive disorder, not elsewhere classified; Esophageal reflux; Female stress incontinence; Gastrointestinal malfunction arising from mental factors; Generalized osteoarthrosis, unspecified site; Herpes zoster dermatitis of eyelid; Insomnia, unspecified; Lumbago; Other chest pain; Other specified cardiac dysrhythmias(427.89); Rectocele; Unspecified disorder of kidney and ureter; and Unspecified essential hypertension.    DISCHARGE MEDICATIONS:  Current Outpatient Prescriptions   Medication Sig Dispense Refill     ACETAMINOPHEN PO Take 650 mg by mouth every 6 hours       ALENDRONATE SODIUM PO Take 10 mg by mouth once a week On Thursday       aspirin 81 MG chewable tablet Take 81 mg by  mouth       atorvastatin (LIPITOR) 40 MG tablet TAKE 1 TAB BY MOUTH AT BEDTIME FOR HIGH CHOLESTEROL 30 tablet 3     Calcium-Vitamin D 600-200 MG-UNIT TABS Take 1 tablet by mouth daily       CITALOPRAM HYDROBROMIDE PO Take 10 mg by mouth daily       cyanocobalamin (VITAMIN  B-12) 1000 MCG tablet Take 1,000 mcg by mouth daily       CYCLOBENZAPRINE HCL PO Take 5 mg by mouth 3 times daily as needed for muscle spasms       ESTRIOL 1MG/GRAM CREAM Apply 1 g topically See Admin Instructions Insert 1 application vaginally at bedtime every Mon, Wed, Fri for vaginal dryness, burning, itching apply pea size amount vaginally at bedtime three times a week       fluticasone (FLONASE) 50 MCG/ACT spray Spray 1 spray into both nostrils 2 times daily       fluticasone-vilanterol (BREO ELLIPTA) 100-25 MCG/INH oral inhaler Inhale 1 puff into the lungs daily       Furosemide (LASIX PO) Take 40 mg by mouth daily        FUROSEMIDE PO Take 20 mg by mouth daily as needed for weight gain daily for >2 pound weight gain in one day       GABAPENTIN PO Take 200 mg by mouth At Bedtime        guaiFENesin (MUCINEX) 600 MG 12 hr tablet Take 600 mg by mouth 2 times daily        guaiFENesin (ROBITUSSIN) 100 MG/5ML SYRP Take 5 mLs by mouth every 4 hours as needed for cough       HYDROcodone-acetaminophen (NORCO) 5-325 MG per tablet Take 1-2 tablets by mouth See Admin Instructions Give 1 tablet by mouth every 4 hours as needed for moderate to severe pain 1 tab for pain 1-5/10 AND Give 2 tablet by mouth every 4 hours as needed for moderate to severe pain 2 tabs for pain 6-10/10       isosorbide mononitrate (IMDUR) 30 MG 24 hr tablet TAKE 1 TAB BY MOUTH ONCE DAILY FOR HYPERTENSION/HIGH BLOOD PRESSURE 30 tablet 3     isradipine (DYNACIRC) 5 MG capsule Take 5 mg by mouth 2 times daily       levalbuterol (XOPENEX) 1.25 MG/3ML neb solution Take 1 ampule by nebulization every 4 hours as needed for shortness of breath / dyspnea or wheezing       Lidocaine &  Menthol-Methyl Sal 5 & 3-10 % THPK Externally apply topically 3 times daily as needed       lisinopril (PRINIVIL,ZESTRIL) 30 MG tablet TAKE 1 TAB BY MOUTH ONCE DAILY FOR HYPERTENSION/HIGH BLOOD PRESSURE 30 tablet 3     MAGNESIUM OXIDE PO Take 250 mg by mouth daily       memantine (NAMENDA) 5 MG tablet TAKE 1 TAB BY MOUTH ONCE DAILY FOR DEMENTIA 30 tablet 3     Menthol, Topical Analgesic, (BIOFREEZE) 4 % GEL Externally apply topically 4 times daily as needed       menthol-zinc oxide (CALMOSEPTINE) 0.44-20.625 % OINT ointment Apply topically 2 times daily as needed for skin protection       nystatin (MYCOSTATIN) 174112 UNIT/GM POWD Apply topically 2 times daily as needed        pantoprazole (PROTONIX) 40 MG EC tablet TAKE 1 TAB BY MOUTH ONCE DAILY BEFORE BREAKFAST FOR GERD 30 tablet 3     polyvinyl alcohol (LIQUIFILM TEARS) 1.4 % ophthalmic solution Place 1 drop into both eyes 2 times daily And 4 times a day PRN       senna-docusate (SENOKOT-S;PERICOLACE) 8.6-50 MG per tablet Take 1 tablet by mouth 2 times daily as needed        SIMETHICONE-80 PO Take 160 mg by mouth 4 times daily as needed for intestinal gas        SPIRONOLACTONE PO Take 25 mg by mouth daily       umeclidinium-vilanterol (ANORO ELLIPTA) 62.5-25 MCG/INH oral inhaler Inhale 1 puff into the lungs daily         MEDICATION CHANGES/RATIONALE:   Gabapentin reduced to 200mg qhs as concerned it could be contributing to edema.   Docusate discontinued and she was started on senna-s d/t constipation, PRN miralax.   Weaned off pepcid  Vitamin B12 supplement discontinued as had elevated B12 level,   Added spironolactone 25mg daily for LE edema.     Controlled medications sent with patient:   Medication: norco , 20 tabs given to patient at the time of discharge to take home     ROS:    10 point ROS of systems including Constitutional, Eyes, Respiratory, Cardiovascular, Gastroenterology, Genitourinary, Integumentary, Muscularskeletal, Psychiatric were all negative  except for pertinent positives noted in my HPI.    Physical Exam:   Vitals: /73  Pulse 55  Temp 98.3  F (36.8  C)  Resp 20  Wt 202 lb (91.6 kg)  SpO2 94%  BMI 35.78 kg/m2  BMI= Body mass index is 35.78 kg/(m^2).    GENERAL APPEARANCE:  Alert, in no distress, oriented, cooperative  RESP:  respiratory effort and palpation of chest normal, lungs clear to auscultation , no respiratory distress, diminished breath sounds bilat bases, on RA  CV:  Palpation and auscultation of heart done , regular rate and rhythm, no murmur, rub, or gallop, +2 pedal pulses, peripheral edema 2+ in BLE  ABDOMEN:  normal bowel sounds, soft, nontender, no hepatosplenomegaly or other masses, round, no guarding or rebound  M/S:   Gait and station abnormal NWB to LLE, nonambulatory, no joint tenderness or gross defformities  SKIN:  Inspection of skin and subcutaneous tissue baseline, Palpation of skin and subcutaneous tissue baseline  NEURO:   Cranial nerves 2-12 are normal tested and grossly at patient's baseline, Examination of sensation by touch normal  PSYCH:  oriented X 3, affect and mood normal, slightly forgetful    DISCHARGE PLAN:  Occupational Therapy, Physical Therapy and From:  [facility]  Patient instructed to follow-up with:  PCP in 7 days      [facility] scheduled appointments:  No future appointments.    MTM referral needed and placed by this provider: No    Pending labs: None    SNF labs   CBC RESULTS:   Recent Labs   Lab Test  06/04/18   0850  09/11/17   0650   WBC  5.3  4.4   RBC  3.70*  3.53*   HGB  9.7*  9.8*   HCT  32.7*  31.5*   MCV  88  89   MCH  26.2*  27.8   MCHC  29.7*  31.1*   RDW  15.9*  15.7*   PLT  172  173       Last Basic Metabolic Panel:  Recent Labs   Lab Test  06/25/18   1020  06/18/18   0835   NA  144  145*   POTASSIUM  4.8  4.3   CHLORIDE  108  106   BLAIR  9.2  9.3   CO2  30  32   BUN  46*  51*   CR  1.36*  1.58*   GLC  99  88       Liver Function Studies -   Recent Labs   Lab  Test  06/25/18   1020   ALBUMIN  3.5       TSH   Date Value Ref Range Status   08/14/2017 0.62 0.40 - 4.00 mU/L Final   04/18/2007 0.60 0.4 - 5.0 mU/L Final       Lab Results   Component Value Date    A1C 6.2 08/31/2017         Discharge Treatments:  Medications as above  Home PT and OT, transfer with slide board.   F/u with podiatry as scheduled  Services per [facility]      Orders:  1. D/C Vit B12 daily   2. On discharge start alendronate 10 mg PO daily for 7 days. Take in the morning on an empty stomach with a full glass of water 30 min prior to eating  3 D/C Bisacodyl and Miralax      TOTAL DISCHARGE TIME:   Greater than 30 minutes  Electronically signed by:  MARY Gómez CNP           Documentation of Face-to-Face and Certification for Home Health Services     Patient: Jazmin Clifton   YOB: 1932  MR Number: [id number]  Today's Date: 6/26/2018    I certify that patient: Jazmin Clifton is under my care and that I, or a nurse practitioner or physician's assistant working with me, had a face-to-face encounter that meets the physician face-to-face encounter requirements with this patient on: 6/26/2018.    This encounter with the patient was in whole, or in part, for the following medical condition, which is the primary reason for home health care: left metatarsal fracture, NWB to LLE, physical deconditioning.    I certify that, based on my findings, the following services are medically necessary home health services: Occupational Therapy and Physical Therapy.    My clinical findings support the need for the above services because: Occupational Therapy Services are needed to assess and treat cognitive ability and address ADL safety due to impairment in needs continued rehab, needs futher ADL trainined. and Physical Therapy Services are needed to assess and treat the following functional impairments: needs continued rehab as remanin NWB to LLE, transfers wtih slide board, expcet weight bearing to  advance in the next few weeks.    Further, I certify that my clinical findings support that this patient is homebound (i.e. absences from home require considerable and taxing effort and are for medical reasons or Bapt[employer] services or infrequently or of short duration when for other reasons) because: Requires assistance of another person or specialized equipment to access medical services because patient: Is unable to exit home safely on own due to: needs assistance with wheelchair, non-ambulatory...    Based on the above findings. I certify that this patient is confined to the home and needs intermittent skilled nursing care, physical therapy and/or speech therapy.  The patient is under my care, and I have initiated the establishment of the plan of care.  This patient will be followed by a physician who will periodically review the plan of care.  Physician/Provider to provide follow up care: Roby Willis    Responsible Medicare certified PECOS Physician: Dr. Ana Cristina Yu  Physician Signature: See electronic signature associated with these discharge orders.  Date: 2018    Electronically signed by Dr. Ana Cristina Yu MD, and only signing for initial order. Please send all follow up questions and concerns or needed follow up signatures to the PCP Roby Willis.      MEDICAL NECESSITY STATEMENT FOR DME    Demographic Information on Jazmin Clifton:    Jazmin STAHL Etienne  Gender: female  : 1932  [address] [address]  [phone] (home)     Medical Record: [id number]  Social Security Number: [id number]  Primary Care Provider: Roby Willis  Insurance: Payor: MEDICA / Plan: [employer] / Product Type: Indemnity /       COPD J44.9    DME:    OXYGEN: 2L NC nocturnal , 85% on RA, 94% on 2L NC at night      VITAL SIGNS:  Vitals: /73  Pulse 55  Temp 98.3  F (36.8  C)  Resp 20  Wt 202 lb (91.6 kg)  SpO2 94%  BMI 35.78  kg/m2  BMI= Body mass index is 35.78 kg/(m^2).       (for Oxygen need Liter flow, Portable tank, O2 sats at rest on room air)     MEDICAL NECESSITY STATEMENT Given her history of COPD, and oxygen saturation of 85% on RA at night patient would benefit from lifetime use of oxygen 2L NC continuous at night to prevent organ damage and improve quality of life. She has improvement of oxygen saturation to 94% with use of oxygen at night. Given it will only be used at night she does not need a portable tank.    DX: COPD J44.9    ELECTRONICALLY SIGNED BY TANVIR CERTIFIED PROVIDER:  MARY Gómez CNP   NPI: [id number]  [place] [facility]  [facility] [address], [address]  [place] [address]

## 2018-06-26 NOTE — LETTER
6/26/2018        RE: [facility]  [address]  [facility] [address]          [facility] DISCHARGE SUMMARY    PATIENT'S NAME: Jazmin Clifton  YOB: 1932  MEDICAL RECORD NUMBER:  [id number]    PRIMARY CARE PROVIDER AND CLINIC RESPONSIBLE AFTER TRANSFER: Roby Willis PHYSICIAN [facility] [address] [facility] [address]    CODE STATUS/ADVANCE DIRECTIVES DISCUSSION:   CPR/Full code        Allergies   Allergen Reactions     Accupril      Ace Inhibitors Unknown     Accupril     Augmentin Unknown     Levofloxacin Unknown     Macrobid [Nitrofurantoin]      Morphine Other (See Comments)     hallucinations     Norvasc [Amlodipine Besylate]      Leg swelling       Quinapril        TRANSFERRING PROVIDERS: MARY Gómez CNP, Ana Cristina Yu MD  DATE OF SNF ADMISSION:  May / 29 / 2018  DATE OF SNF (anticipated) DISCHARGE: June / 27 / 2018  DISCHARGE DISPOSITION: Assisted Living: [facility]   Nursing Facility: [facility] stay 5/28/18 to 5/29/18.     Condition on Discharge:  Improving.  Function:  Extensive assist with ADLs except for personal hygiene. Extensive assist with transfers. Nonambulatory. Transfers with slide board  Cognitive Scores: CPT 4.7/5.6    Equipment: wheelchair as not current ambulatory    DISCHARGE DIAGNOSIS:   1. Closed traumatic nondisplaced fracture of metatarsal bone of left foot with routine healing, subsequent encounter    2. Physical deconditioning    3. Chronic congestive heart failure, unspecified congestive heart failure type (H)    4. Bilateral leg edema    5. CKD (chronic kidney disease) stage 3, GFR 30-59 ml/min    6. Excessive cerumen in ear canal, bilateral    7. Chronic obstructive pulmonary disease, unspecified COPD type (H)    8. Osteoporosis with current pathological fracture with routine healing, unspecified osteoporosis type, subsequent encounter    9. Coronary  artery disease involving native coronary artery of native heart without angina pectoris    10. Benign essential hypertension    11. Hyperlipidemia, unspecified hyperlipidemia type    12. Anemia in other chronic diseases classified elsewhere    13. Dementia without behavioral disturbance, unspecified dementia type    14. H/O: CVA (cerebrovascular accident)    15. Gastroesophageal reflux disease without esophagitis    16. Slow transit constipation    17. Gastroesophageal reflux disease, esophagitis presence not specified    18. Mild major depression (H)        HPI Nursing Facility Course:  HPI information obtained from: facility chart records, facility staff, patient report and [facility] chart review.    From hospital discharge summary:    Jazmin Clifton is a 85 y.o. old female w/ PMH of hypertension, COPD, hyperlipidemia, dementia, CHF, brain aneurysm, stroke, GERD, CKD, and depression who was admitted after mechanical fall. Patient stated she tripped on her table, denies LOC or hitting her head. X-ray of the left foot nondisplaced transverse fracture through proximal shafts of the second, third, and fourth metatarsals with a probable nondisplaced fracture of the distal shaft of the luis metatarsal without dislocation. Discussed case with podiatry, Dr. Gibbs, who recommended immobilization and follow-up as outpatient, no need for acute surgical management. Fractrues 2/2 to osteoporosis with History of compression fractures and vertebroplasty. Creatinine clearance 42, discussed with patient and will initiate bisphosphonate at this time     Closed traumatic nondisplaced fracture of metatarsal bone of left foot with routine healing, subsequent encounter  Physical deconditioning  Remain NWB to LLE, was see by podiatry, splint removed and surgical boot place. Pain controlled on scheduled APAP, PRN norco, and PRN flexeril. . She worked with PT and OT, able to transfer with assist and slide board. She was accepted at  new [facility] which is better able to meet her needs. She will have home PT and OT at discharge.      Chronic congestive heart failure, unspecified congestive heart failure type (H)  Bilateral leg edema  Had increased weight, so lasix increased from 40mg daily to 40mg q AM, 20mg q afternoon. Weight continued to trend up, she developed Rhonchi. Lasix increased to 40mg BID x2 days the return to 40mg daily and spironolactone 25mg daily was added. Weight trended down, now back to admission weight. Remains on lisinopril 30mg daily, and isradipine 5mg BID.   Last 3 BPs: 148/73, 127/71, 149/67  Admission Weight: 201.4lbs  Current Weight: 202lbs  Blood Sugar Range:   N/A    CKD (chronic kidney disease) stage 3, GFR 30-59 ml/min  Baseline 0.9-1.2, creatinine did increase to 1.58, but improved to 1.36 on recheck.     Excessive cerumen in ear canal, bilateral  C/o of pressure and decreased hearing in bilat ears. Debrox placed in bilat ears x 5 days then ear was manually removed. She reports she had decreased pressure and hearing improved.     Chronic obstructive pulmonary disease, unspecified COPD type (H)  Did have some hypoxia initially in TCU, requiring 2L NC, more so at night. She was 85% on RA at night, so will discharge on 2L NC at night. She remains on PTA Anoro Ellipta inhaler daily, cough syrup PRN, Breo Ellipta daily, and PRN levalbuterol nebs. No concerns of exacerbation during TCU stay.     Osteoporosis with current pathological fracture with routine healing, unspecified osteoporosis type, subsequent encounter  Fractures felt to be related to osteoporosis and osteopenia. She is on calcium-vitamin D supplement. Will start fosamax on discharge per recommendation of hospitalist (was not started in TCU d/t cost).     Coronary artery disease involving native coronary artery of native heart without angina pectoris  Benign essential hypertension  Hyperlipidemia, unspecified hyperlipidemia type  She is currently on ASA 81mg,  lipitor, lasix 40mg daily, imdur 30mg, lisinopril 30mg daily. BP fairly well controlled t/o TCU stay.     Anemia in other chronic diseases classified elsewhere  Baseline Hgb mid 9's. She had been on B12 supplement, however she requested for number of medications to be reduced. B12 level checked and was elevated, so supplement discontinued, recommend further f/u with PCP.     Dementia without behavioral disturbance, unspecified dementia type  She is a good historian, moving into [employer]. She is on memantine 5mg daily.     H/O: CVA (cerebrovascular accident)  On ASA and lipitor, remained at neurological baseline.     Gastroesophageal reflux disease without esophagitis  Was weaned on pepcid as felt to redundant and patient was requesting medication reduction. She remains on protonix 40mg daily.     Slow transit constipation  Had issues with constipation, required suppository. Docusate discontinued and she was started on senna-s 1 tab BID and PRN miralax with improvement.     Mild major depression (H)  Remained on PTA citalopram 10mg daily, mood stable in TCU.       PAST MEDICAL HISTORY:  has a past medical history of ABDOMINAL PAIN GENERALIZED (3/15/2006); Abdominal pain, generalized (3/15/2006); Atherosclerosis of renal artery (H); BENIGN HYPERTENSION (5/1/2006); Benign neoplasm of scalp and skin of neck; Cerebral aneurysm, nonruptured; Depressive disorder, not elsewhere classified; Esophageal reflux; Female stress incontinence; Gastrointestinal malfunction arising from mental factors; Generalized osteoarthrosis, unspecified site; Herpes zoster dermatitis of eyelid; Insomnia, unspecified; Lumbago; Other chest pain; Other specified cardiac dysrhythmias(427.89); Rectocele; Unspecified disorder of kidney and ureter; and Unspecified essential hypertension.    DISCHARGE MEDICATIONS:  Current Outpatient Prescriptions   Medication Sig Dispense Refill     ACETAMINOPHEN PO Take 650 mg by mouth every 6 hours       ALENDRONATE  SODIUM PO Take 10 mg by mouth once a week On Thursday       aspirin 81 MG chewable tablet Take 81 mg by mouth       atorvastatin (LIPITOR) 40 MG tablet TAKE 1 TAB BY MOUTH AT BEDTIME FOR HIGH CHOLESTEROL 30 tablet 3     Calcium-Vitamin D 600-200 MG-UNIT TABS Take 1 tablet by mouth daily       CITALOPRAM HYDROBROMIDE PO Take 10 mg by mouth daily       cyanocobalamin (VITAMIN  B-12) 1000 MCG tablet Take 1,000 mcg by mouth daily       CYCLOBENZAPRINE HCL PO Take 5 mg by mouth 3 times daily as needed for muscle spasms       ESTRIOL 1MG/GRAM CREAM Apply 1 g topically See Admin Instructions Insert 1 application vaginally at bedtime every Mon, Wed, Fri for vaginal dryness, burning, itching apply pea size amount vaginally at bedtime three times a week       fluticasone (FLONASE) 50 MCG/ACT spray Spray 1 spray into both nostrils 2 times daily       fluticasone-vilanterol (BREO ELLIPTA) 100-25 MCG/INH oral inhaler Inhale 1 puff into the lungs daily       Furosemide (LASIX PO) Take 40 mg by mouth daily        FUROSEMIDE PO Take 20 mg by mouth daily as needed for weight gain daily for >2 pound weight gain in one day       GABAPENTIN PO Take 200 mg by mouth At Bedtime        guaiFENesin (MUCINEX) 600 MG 12 hr tablet Take 600 mg by mouth 2 times daily        guaiFENesin (ROBITUSSIN) 100 MG/5ML SYRP Take 5 mLs by mouth every 4 hours as needed for cough       HYDROcodone-acetaminophen (NORCO) 5-325 MG per tablet Take 1-2 tablets by mouth See Admin Instructions Give 1 tablet by mouth every 4 hours as needed for moderate to severe pain 1 tab for pain 1-5/10 AND Give 2 tablet by mouth every 4 hours as needed for moderate to severe pain 2 tabs for pain 6-10/10       isosorbide mononitrate (IMDUR) 30 MG 24 hr tablet TAKE 1 TAB BY MOUTH ONCE DAILY FOR HYPERTENSION/HIGH BLOOD PRESSURE 30 tablet 3     isradipine (DYNACIRC) 5 MG capsule Take 5 mg by mouth 2 times daily       levalbuterol (XOPENEX) 1.25 MG/3ML neb solution Take 1 ampule by  nebulization every 4 hours as needed for shortness of breath / dyspnea or wheezing       Lidocaine & Menthol-Methyl Sal 5 & 3-10 % THPK Externally apply topically 3 times daily as needed       lisinopril (PRINIVIL,ZESTRIL) 30 MG tablet TAKE 1 TAB BY MOUTH ONCE DAILY FOR HYPERTENSION/HIGH BLOOD PRESSURE 30 tablet 3     MAGNESIUM OXIDE PO Take 250 mg by mouth daily       memantine (NAMENDA) 5 MG tablet TAKE 1 TAB BY MOUTH ONCE DAILY FOR DEMENTIA 30 tablet 3     Menthol, Topical Analgesic, (BIOFREEZE) 4 % GEL Externally apply topically 4 times daily as needed       menthol-zinc oxide (CALMOSEPTINE) 0.44-20.625 % OINT ointment Apply topically 2 times daily as needed for skin protection       nystatin (MYCOSTATIN) 602339 UNIT/GM POWD Apply topically 2 times daily as needed        pantoprazole (PROTONIX) 40 MG EC tablet TAKE 1 TAB BY MOUTH ONCE DAILY BEFORE BREAKFAST FOR GERD 30 tablet 3     polyvinyl alcohol (LIQUIFILM TEARS) 1.4 % ophthalmic solution Place 1 drop into both eyes 2 times daily And 4 times a day PRN       senna-docusate (SENOKOT-S;PERICOLACE) 8.6-50 MG per tablet Take 1 tablet by mouth 2 times daily as needed        SIMETHICONE-80 PO Take 160 mg by mouth 4 times daily as needed for intestinal gas        SPIRONOLACTONE PO Take 25 mg by mouth daily       umeclidinium-vilanterol (ANORO ELLIPTA) 62.5-25 MCG/INH oral inhaler Inhale 1 puff into the lungs daily         MEDICATION CHANGES/RATIONALE:   Gabapentin reduced to 200mg qhs as concerned it could be contributing to edema.   Docusate discontinued and she was started on senna-s d/t constipation, PRN miralax.   Weaned off pepcid  Vitamin B12 supplement discontinued as had elevated B12 level,   Added spironolactone 25mg daily for LE edema.     Controlled medications sent with patient:   Medication: norco , 20 tabs given to patient at the time of discharge to take home     ROS:    10 point ROS of systems including Constitutional, Eyes, Respiratory,  Cardiovascular, Gastroenterology, Genitourinary, Integumentary, Muscularskeletal, Psychiatric were all negative except for pertinent positives noted in my HPI.    Physical Exam:   Vitals: /73  Pulse 55  Temp 98.3  F (36.8  C)  Resp 20  Wt 202 lb (91.6 kg)  SpO2 94%  BMI 35.78 kg/m2  BMI= Body mass index is 35.78 kg/(m^2).    GENERAL APPEARANCE:  Alert, in no distress, oriented, cooperative  RESP:  respiratory effort and palpation of chest normal, lungs clear to auscultation , no respiratory distress, diminished breath sounds bilat bases, on RA  CV:  Palpation and auscultation of heart done , regular rate and rhythm, no murmur, rub, or gallop, +2 pedal pulses, peripheral edema 2+ in BLE  ABDOMEN:  normal bowel sounds, soft, nontender, no hepatosplenomegaly or other masses, round, no guarding or rebound  M/S:   Gait and station abnormal NWB to LLE, nonambulatory, no joint tenderness or gross defformities  SKIN:  Inspection of skin and subcutaneous tissue baseline, Palpation of skin and subcutaneous tissue baseline  NEURO:   Cranial nerves 2-12 are normal tested and grossly at patient's baseline, Examination of sensation by touch normal  PSYCH:  oriented X 3, affect and mood normal, slightly forgetful    DISCHARGE PLAN:  Occupational Therapy, Physical Therapy and From:  [facility]  Patient instructed to follow-up with:  PCP in 7 days      Current [place] scheduled appointments:  No future appointments.    MTM referral needed and placed by this provider: No    Pending labs: None    SNF labs   CBC RESULTS:   Recent Labs   Lab Test  06/04/18   0850  09/11/17   0650   WBC  5.3  4.4   RBC  3.70*  3.53*   HGB  9.7*  9.8*   HCT  32.7*  31.5*   MCV  88  89   MCH  26.2*  27.8   MCHC  29.7*  31.1*   RDW  15.9*  15.7*   PLT  172  173       Last Basic Metabolic Panel:  Recent Labs   Lab Test  06/25/18   1020  06/18/18   0835   NA  144  145*   POTASSIUM  4.8  4.3   CHLORIDE  108  106   BLAIR  9.2  9.3    CO2  30  32   BUN  46*  51*   CR  1.36*  1.58*   GLC  99  88       Liver Function Studies -   Recent Labs   Lab Test  06/25/18   1020   ALBUMIN  3.5       TSH   Date Value Ref Range Status   08/14/2017 0.62 0.40 - 4.00 mU/L Final   04/18/2007 0.60 0.4 - 5.0 mU/L Final       Lab Results   Component Value Date    A1C 6.2 08/31/2017         Discharge Treatments:  Medications as above  Home PT and OT, transfer with slide board.   F/u with podiatry as scheduled  Services per [facility]      Orders:  1. D/C Vit B12 daily   2. On discharge start alendronate 10 mg PO daily for 7 days. Take in the morning on an empty stomach with a full glass of water 30 min prior to eating  3 D/C Bisacodyl and Miralax      TOTAL DISCHARGE TIME:   Greater than 30 minutes  Electronically signed by:  MARY Gómez CNP           Documentation of Face-to-Face and Certification for Home Health Services     Patient: Jazmin Clifton   YOB: 1932  MR Number: [id number]  Today's Date: 6/26/2018    I certify that patient: Jazmin Clifton is under my care and that I, or a nurse practitioner or physician's assistant working with me, had a face-to-face encounter that meets the physician face-to-face encounter requirements with this patient on: 6/26/2018.    This encounter with the patient was in whole, or in part, for the following medical condition, which is the primary reason for home health care: left metatarsal fracture, NWB to LLE, physical deconditioning.    I certify that, based on my findings, the following services are medically necessary home health services: Occupational Therapy and Physical Therapy.    My clinical findings support the need for the above services because: Occupational Therapy Services are needed to assess and treat cognitive ability and address ADL safety due to impairment in needs continued rehab, needs futher ADL trainined. and Physical Therapy Services are needed to assess and treat the following  functional impairments: needs continued rehab as remanin NWB to LLE, transfers wtih slide board, expcet weight bearing to advance in the next few weeks.    Further, I certify that my clinical findings support that this patient is homebound (i.e. absences from home require considerable and taxing effort and are for medical reasons or [facility] services or infrequently or of short duration when for other reasons) because: Requires assistance of another person or specialized equipment to access medical services because patient: Is unable to exit home safely on own due to: needs assistance with wheelchair, non-ambulatory...    Based on the above findings. I certify that this patient is confined to the home and needs intermittent skilled nursing care, physical therapy and/or speech therapy.  The patient is under my care, and I have initiated the establishment of the plan of care.  This patient will be followed by a physician who will periodically review the plan of care.  Physician/Provider to provide follow up care: Roby Willis    Responsible Medicare certified PECOS Physician: Dr. Ana Cristina Yu  Physician Signature: See electronic signature associated with these discharge orders.  Date: 2018    Electronically signed by Dr. Ana Cristina Yu MD, and only signing for initial order. Please send all follow up questions and concerns or needed follow up signatures to the PCP Roby Willis.      MEDICAL NECESSITY STATEMENT FOR DME    Demographic Information on Jazmin Clifton:    Jazmin Clifton  Gender: female  : 1932  [address] [address]  [phone] (home)     Medical Record: [id number]  Social Security Number: [id number]  Primary Care Provider: Roby Willis  Insurance: Payor: MEDICA / Plan: [employer]ICA [employer] / Product Type: Indemnity /       COPD J44.9    DME:    OXYGEN: 2L NC nocturnal , 85% on RA, 94% on 2L NC at night      VITAL  SIGNS:  Vitals: /73  Pulse 55  Temp 98.3  F (36.8  C)  Resp 20  Wt 202 lb (91.6 kg)  SpO2 94%  BMI 35.78 kg/m2  BMI= Body mass index is 35.78 kg/(m^2).       (for Oxygen need Liter flow, Portable tank, O2 sats at rest on room air)     MEDICAL NECESSITY STATEMENT Given her history of COPD, and oxygen saturation of 85% on RA at night patient would benefit from lifetime use of oxygen 2L NC continuous at night to prevent organ damage and improve quality of life. She has improvement of oxygen saturation to 94% with use of oxygen at night. Given it will only be used at night she does not need a portable tank.    DX: COPD J44.9    ELECTRONICALLY SIGNED BY TANVIR CERTIFIED PROVIDER:  MARY Gómez CNP   NPI: [id number]  [facility]  [address] [place] [address], [address]  [place] [address]            Sincerely,        MARY Gómez CNP

## 2018-07-05 ENCOUNTER — COMMUNICATION - HEALTHEAST (OUTPATIENT)
Dept: RESPIRATORY THERAPY | Facility: HOSPITAL | Age: 83
End: 2018-07-05

## 2018-07-31 ENCOUNTER — COMMUNICATION - HEALTHEAST (OUTPATIENT)
Dept: RESPIRATORY THERAPY | Facility: HOSPITAL | Age: 83
End: 2018-07-31

## 2018-08-24 ENCOUNTER — OFFICE VISIT - HEALTHEAST (OUTPATIENT)
Dept: SURGERY | Facility: CLINIC | Age: 83
End: 2018-08-24

## 2018-08-24 DIAGNOSIS — I89.0 LYMPHEDEMA: ICD-10-CM

## 2018-08-24 ASSESSMENT — MIFFLIN-ST. JEOR: SCORE: 1333.54

## 2018-08-29 DIAGNOSIS — M19.90 OSTEOARTHRITIS, UNSPECIFIED OSTEOARTHRITIS TYPE, UNSPECIFIED SITE: Primary | ICD-10-CM

## 2018-08-30 ENCOUNTER — COMMUNICATION - HEALTHEAST (OUTPATIENT)
Dept: RESPIRATORY THERAPY | Facility: HOSPITAL | Age: 83
End: 2018-08-30

## 2018-09-25 DIAGNOSIS — I65.29 CAROTID STENOSIS: Primary | ICD-10-CM

## 2018-10-01 ENCOUNTER — COMMUNICATION - HEALTHEAST (OUTPATIENT)
Dept: RESPIRATORY THERAPY | Facility: HOSPITAL | Age: 83
End: 2018-10-01

## 2018-10-16 ENCOUNTER — RECORDS - HEALTHEAST (OUTPATIENT)
Dept: LAB | Facility: CLINIC | Age: 83
End: 2018-10-16

## 2018-10-16 LAB
ALBUMIN UR-MCNC: NEGATIVE MG/DL
APPEARANCE UR: ABNORMAL
BACTERIA #/AREA URNS HPF: ABNORMAL HPF
BILIRUB UR QL STRIP: NEGATIVE
COLOR UR AUTO: YELLOW
GLUCOSE UR STRIP-MCNC: NEGATIVE MG/DL
HGB UR QL STRIP: NEGATIVE
HYALINE CASTS #/AREA URNS LPF: ABNORMAL LPF
KETONES UR STRIP-MCNC: NEGATIVE MG/DL
LEUKOCYTE ESTERASE UR QL STRIP: ABNORMAL
MUCOUS THREADS #/AREA URNS LPF: ABNORMAL LPF
NITRATE UR QL: POSITIVE
PH UR STRIP: 5 [PH] (ref 4.5–8)
RBC #/AREA URNS AUTO: ABNORMAL HPF
SP GR UR STRIP: 1.01 (ref 1–1.03)
SQUAMOUS #/AREA URNS AUTO: ABNORMAL LPF
TRANS CELLS #/AREA URNS HPF: ABNORMAL LPF
UROBILINOGEN UR STRIP-ACNC: ABNORMAL
WBC #/AREA URNS AUTO: ABNORMAL HPF
WBC CLUMPS #/AREA URNS HPF: PRESENT /[HPF]

## 2018-10-18 LAB — BACTERIA SPEC CULT: ABNORMAL

## 2018-10-26 ENCOUNTER — RECORDS - HEALTHEAST (OUTPATIENT)
Dept: LAB | Facility: CLINIC | Age: 83
End: 2018-10-26

## 2018-10-29 LAB
ANION GAP SERPL CALCULATED.3IONS-SCNC: 8 MMOL/L (ref 5–18)
BUN SERPL-MCNC: 61 MG/DL (ref 8–28)
CALCIUM SERPL-MCNC: 9.9 MG/DL (ref 8.5–10.5)
CHLORIDE BLD-SCNC: 108 MMOL/L (ref 98–107)
CHOLEST SERPL-MCNC: 96 MG/DL
CO2 SERPL-SCNC: 24 MMOL/L (ref 22–31)
CREAT SERPL-MCNC: 1.88 MG/DL (ref 0.6–1.1)
ERYTHROCYTE [DISTWIDTH] IN BLOOD BY AUTOMATED COUNT: 17.3 % (ref 11–14.5)
FASTING STATUS PATIENT QL REPORTED: ABNORMAL
GFR SERPL CREATININE-BSD FRML MDRD: 25 ML/MIN/1.73M2
GLUCOSE BLD-MCNC: 84 MG/DL (ref 70–125)
HCT VFR BLD AUTO: 34.7 % (ref 35–47)
HDLC SERPL-MCNC: 41 MG/DL
HGB BLD-MCNC: 10.2 G/DL (ref 12–16)
LDLC SERPL CALC-MCNC: 36 MG/DL
MAGNESIUM SERPL-MCNC: 2.2 MG/DL (ref 1.8–2.6)
MCH RBC QN AUTO: 26.1 PG (ref 27–34)
MCHC RBC AUTO-ENTMCNC: 29.4 G/DL (ref 32–36)
MCV RBC AUTO: 89 FL (ref 80–100)
PLATELET # BLD AUTO: 189 THOU/UL (ref 140–440)
PMV BLD AUTO: 11.4 FL (ref 8.5–12.5)
POTASSIUM BLD-SCNC: 5.9 MMOL/L (ref 3.5–5)
RBC # BLD AUTO: 3.91 MILL/UL (ref 3.8–5.4)
SODIUM SERPL-SCNC: 140 MMOL/L (ref 136–145)
TRIGL SERPL-MCNC: 93 MG/DL
TSH SERPL DL<=0.005 MIU/L-ACNC: 0.37 UIU/ML (ref 0.3–5)
VIT B12 SERPL-MCNC: 732 PG/ML (ref 213–816)
WBC: 6 THOU/UL (ref 4–11)

## 2018-10-30 LAB — 25(OH)D3 SERPL-MCNC: 34.9 NG/ML (ref 30–80)

## 2018-10-31 ENCOUNTER — COMMUNICATION - HEALTHEAST (OUTPATIENT)
Dept: RESPIRATORY THERAPY | Facility: HOSPITAL | Age: 83
End: 2018-10-31

## 2018-11-05 ENCOUNTER — COMMUNICATION - HEALTHEAST (OUTPATIENT)
Dept: NEUROSURGERY | Facility: CLINIC | Age: 83
End: 2018-11-05

## 2018-11-08 ENCOUNTER — HOSPITAL LABORATORY (OUTPATIENT)
Facility: OTHER | Age: 83
End: 2018-11-08

## 2018-11-08 LAB
ANION GAP SERPL CALCULATED.3IONS-SCNC: 8 MMOL/L (ref 3–14)
BUN SERPL-MCNC: 37 MG/DL (ref 7–30)
CALCIUM SERPL-MCNC: 8.9 MG/DL (ref 8.5–10.1)
CHLORIDE SERPL-SCNC: 109 MMOL/L (ref 94–109)
CO2 SERPL-SCNC: 29 MMOL/L (ref 20–32)
CREAT SERPL-MCNC: 1.27 MG/DL (ref 0.52–1.04)
ERYTHROCYTE [DISTWIDTH] IN BLOOD BY AUTOMATED COUNT: 17 % (ref 10–15)
GFR SERPL CREATININE-BSD FRML MDRD: 40 ML/MIN/1.7M2
GLUCOSE SERPL-MCNC: 80 MG/DL (ref 70–99)
HCT VFR BLD AUTO: 32.5 % (ref 35–47)
HGB BLD-MCNC: 9.7 G/DL (ref 11.7–15.7)
MCH RBC QN AUTO: 26.4 PG (ref 26.5–33)
MCHC RBC AUTO-ENTMCNC: 29.8 G/DL (ref 31.5–36.5)
MCV RBC AUTO: 89 FL (ref 78–100)
PLATELET # BLD AUTO: 186 10E9/L (ref 150–450)
POTASSIUM SERPL-SCNC: 4.4 MMOL/L (ref 3.4–5.3)
RBC # BLD AUTO: 3.67 10E12/L (ref 3.8–5.2)
SODIUM SERPL-SCNC: 146 MMOL/L (ref 133–144)
WBC # BLD AUTO: 6.3 10E9/L (ref 4–11)

## 2018-11-09 ENCOUNTER — NURSING HOME VISIT (OUTPATIENT)
Dept: GERIATRICS | Facility: CLINIC | Age: 83
End: 2018-11-09
Payer: COMMERCIAL

## 2018-11-09 ENCOUNTER — OFFICE VISIT - HEALTHEAST (OUTPATIENT)
Dept: NEUROSURGERY | Facility: CLINIC | Age: 83
End: 2018-11-09

## 2018-11-09 VITALS
TEMPERATURE: 99 F | DIASTOLIC BLOOD PRESSURE: 62 MMHG | BODY MASS INDEX: 34.2 KG/M2 | RESPIRATION RATE: 20 BRPM | HEIGHT: 63 IN | HEART RATE: 67 BPM | SYSTOLIC BLOOD PRESSURE: 104 MMHG | WEIGHT: 193 LBS

## 2018-11-09 DIAGNOSIS — N28.1 RENAL CYST: ICD-10-CM

## 2018-11-09 DIAGNOSIS — M48.062 SPINAL STENOSIS, LUMBAR REGION, WITH NEUROGENIC CLAUDICATION: ICD-10-CM

## 2018-11-09 DIAGNOSIS — R53.81 PHYSICAL DECONDITIONING: ICD-10-CM

## 2018-11-09 DIAGNOSIS — M81.0 AGE RELATED OSTEOPOROSIS, UNSPECIFIED PATHOLOGICAL FRACTURE PRESENCE: ICD-10-CM

## 2018-11-09 DIAGNOSIS — K59.01 SLOW TRANSIT CONSTIPATION: ICD-10-CM

## 2018-11-09 DIAGNOSIS — J44.9 CHRONIC OBSTRUCTIVE PULMONARY DISEASE, UNSPECIFIED COPD TYPE (H): ICD-10-CM

## 2018-11-09 DIAGNOSIS — M25.552 HIP PAIN, LEFT: ICD-10-CM

## 2018-11-09 DIAGNOSIS — F03.90 DEMENTIA WITHOUT BEHAVIORAL DISTURBANCE, UNSPECIFIED DEMENTIA TYPE: ICD-10-CM

## 2018-11-09 DIAGNOSIS — I50.9 CONGESTIVE HEART FAILURE, UNSPECIFIED HF CHRONICITY, UNSPECIFIED HEART FAILURE TYPE (H): ICD-10-CM

## 2018-11-09 DIAGNOSIS — I25.10 CORONARY ARTERY DISEASE INVOLVING NATIVE CORONARY ARTERY OF NATIVE HEART WITHOUT ANGINA PECTORIS: ICD-10-CM

## 2018-11-09 DIAGNOSIS — M48.062 SPINAL STENOSIS OF LUMBAR REGION WITH NEUROGENIC CLAUDICATION: Primary | ICD-10-CM

## 2018-11-09 DIAGNOSIS — I10 BENIGN ESSENTIAL HYPERTENSION: ICD-10-CM

## 2018-11-09 DIAGNOSIS — R11.2 INTRACTABLE VOMITING WITH NAUSEA, UNSPECIFIED VOMITING TYPE: ICD-10-CM

## 2018-11-09 DIAGNOSIS — N17.9 ACUTE KIDNEY INJURY (H): ICD-10-CM

## 2018-11-09 DIAGNOSIS — E66.01 MORBID OBESITY (H): ICD-10-CM

## 2018-11-09 DIAGNOSIS — N18.30 CKD (CHRONIC KIDNEY DISEASE) STAGE 3, GFR 30-59 ML/MIN (H): ICD-10-CM

## 2018-11-09 DIAGNOSIS — K21.9 GASTROESOPHAGEAL REFLUX DISEASE WITHOUT ESOPHAGITIS: ICD-10-CM

## 2018-11-09 DIAGNOSIS — E78.5 HYPERLIPIDEMIA, UNSPECIFIED HYPERLIPIDEMIA TYPE: ICD-10-CM

## 2018-11-09 PROCEDURE — 99310 SBSQ NF CARE HIGH MDM 45: CPT | Performed by: NURSE PRACTITIONER

## 2018-11-09 RX ORDER — POLYETHYLENE GLYCOL 3350 17 G/17G
17 POWDER, FOR SOLUTION ORAL DAILY PRN
COMMUNITY
End: 2019-01-29 | Stop reason: SINTOL

## 2018-11-09 RX ORDER — CALCIUM CARBONATE 500 MG/1
1 TABLET, CHEWABLE ORAL
Status: ON HOLD | COMMUNITY
End: 2019-01-01

## 2018-11-09 RX ORDER — METHYLPREDNISOLONE 4 MG
4 TABLET, DOSE PACK ORAL DAILY
COMMUNITY
End: 2019-01-15

## 2018-11-09 RX ORDER — LOPERAMIDE HCL 2 MG
2 CAPSULE ORAL 4 TIMES DAILY PRN
COMMUNITY
End: 2019-01-29

## 2018-11-09 RX ORDER — CALCIUM CARBONATE/VITAMIN D3 500-10/5ML
30 LIQUID (ML) ORAL DAILY
COMMUNITY
End: 2018-12-28

## 2018-11-09 ASSESSMENT — MIFFLIN-ST. JEOR: SCORE: 1270.04

## 2018-11-09 NOTE — LETTER
11/9/2018        RE: [facility]  [address]  [facility] [address]        [place] [facility]  PRIMARY CARE PROVIDER AND CLINIC:  Roby Willis PHYSIC[facility] [address] [facility] [address]*  Chief Complaint   Patient presents with     FDC Acute     [place] [facility] Record Number:  [id number]  Place of Service where encounter took place[facility] [[id number]]    HPI:    Jazmin Clifton is a 85 year old  (12/30/1932),admitted to the above facility from  [facility].  Hospital stay 10/29/18 through 11/6/18.  Admitted to this facility for  rehab, medical management and nursing care.  HPI information obtained from: facility chart records, facility staff, patient report and [facility] chart review.      Hospital Summary:   Jazmin Clifton is a 85 y.o. Female with PMH of CKD, CHF, COPD, previous laminectomy, dementia and falls with left metatarsal fracture who was admitted 10/30/18 with back pain and found to have acute on chronic kidney disease which improved with IV fluids. MRI lumbar spine revealed moderate to marked canal stenosis at L1-L2 and spine was consulted, plan for outpatient surgery in the next week. She was given course of medrol. Recent UTI but UC done while IP was negative. Neurosurgery recommended starting zinc and vitamin C. Right renal cyst found on CT scan, f/u neuro imaging recommended in 3 months. Creatinine improved to baseline, PVR's checked, highest was 112. ACE and spironolactone held while IP, but resumed on discharge. Did  have some nausea while IP, was given zofran with improvement. When medically stable she was discharged to TCU for rehab and medical management with plan to f/u with neurosurgery on 11/9, likely surgery next week.        Current issues are:      Spinal stenosis of lumbar region with neurogenic claudication  Hip pain, left  Physical deconditioning  Reports no pain at  rest, continues to have significant pain with movement, staff reports only minimal use of narcotics. She reports as previously been on percocet but was changed to norco when she moved into [facility], and she does not know why. Staff did apply biofreeze to back earlier and she does think that it helped. She has completed course of steroids, thinks they helped some. She is on norco 1-2 tabs q4h PRN, APAP PRN, gabapentin 200mg at bedtime. She is working with PT and OT, denies any numbness to LE, but continues to have pain in left hip and leg. Ambulates with walker at baseline. Pain did not wake her from sleep last night and she reports that she slept well.    Gastroesophageal reflux disease without esophagitis  Intractable vomiting with nausea, unspecified vomiting type  Slow transit constipation  Reports she had 2-3 episodes of vomiting yesterday, not sure what caused it. Denies any abdominal pain, states the nausea started ~ 3 weeks ago when her back pain was increasing. She did have some constipation while IP, but now having loose stools (3 yesterday, 2 this AM) as was placed on aggressive bowel regimen. She is on Protonix, denies any indigestion or heartburn. Does not recall ever getting nausea with pain medication before.     Acute kidney injury (H)  CKD (chronic kidney disease) stage 3, GFR 30-59 ml/min (H)  Renal cyst  Denies any urgency, frequency or dysuria. Feels that she is emptying her bladder. T 99 this AM, denies any chills, night sweats, no CVA tenderness.     Chronic obstructive pulmonary disease, unspecified COPD type (H)  Hyperlipidemia, unspecified hyperlipidemia type  Benign essential hypertension  Coronary artery disease involving native coronary artery of native heart without angina pectoris  Congestive heart failure, unspecified HF chronicity, unspecified heart failure type (H  Morbid obesity (H)  Age related osteoporosis, unspecified pathological fracture presence  Dementia without behavioral  disturbance, unspecified dementia type      CODE STATUS/ADVANCE DIRECTIVES DISCUSSION:   CPR/Full code   Patient's living condition: lives in an assisted living facility    ALLERGIES:Accupril; Ace inhibitors; Augmentin; Levofloxacin; Macrobid [nitrofurantoin]; Morphine; Norvasc [amlodipine besylate]; and Quinapril  PAST MEDICAL HISTORY:  has a past medical history of ABDOMINAL PAIN GENERALIZED (3/15/2006); Abdominal pain, generalized (3/15/2006); Atherosclerosis of renal artery (H); BENIGN HYPERTENSION (5/1/2006); Benign neoplasm of scalp and skin of neck; Cerebral aneurysm, nonruptured; Depressive disorder, not elsewhere classified; Esophageal reflux; Female stress incontinence; Gastrointestinal malfunction arising from mental factors; Generalized osteoarthrosis, unspecified site; Herpes zoster dermatitis of eyelid; Insomnia, unspecified; Lumbago; Other chest pain; Other specified cardiac dysrhythmias(427.89); Rectocele; Unspecified disorder of kidney and ureter; and Unspecified essential hypertension.  PAST SURGICAL HISTORY:  has a past surgical history that includes surgical history of - ; surgical history of - ; surgical history of -  (1996); surgical history of - ; surgical history of - ; cholecystectomy, laporoscopic (1997); hysterectomy, mirtha (1982); and Endarterectomy carotid (Right, 8/31/2017).  FAMILY HISTORY: family history includes Asthma in her son; Cancer in her brother and mother; Cerebrovascular Disease in her father; Diabetes in her son; Eye Disorder in her son; GASTROINTESTINAL DISEASE in her son; HEART DISEASE in her father. There is no history of C.A.D., Hypertension, Breast Cancer, Cancer - colorectal, or Prostate Cancer.  SOCIAL HISTORY:  reports that she has quit smoking. She does not have any smokeless tobacco history on file. She reports that she drinks alcohol. She reports that she does not use illicit drugs.    Post Discharge Medication Reconciliation Status: discharge medications  reconciled and changed, per note/orders (see AVS).  Current Outpatient Prescriptions   Medication Sig Dispense Refill     ACETAMINOPHEN PO Take 325 mg by mouth every 4 hours as needed        Ascorbic Acid (VITAMIN C PO) Take 500 mg by mouth daily       aspirin 81 MG chewable tablet Take 81 mg by mouth       atorvastatin (LIPITOR) 40 MG tablet TAKE 1 TAB BY MOUTH AT BEDTIME FOR HIGH CHOLESTEROL 30 tablet 3     B COMPLEX-C-E-ZN PO Take 1 tablet by mouth daily       CALCIUM 600+D 600-200 MG-UNIT TABS TAKE 1 TAB BY MOUTH ONCE DAILY 30 tablet 11     calcium carbonate (TUMS) 500 MG chewable tablet Take 1 chew tab by mouth every hour as needed for heartburn       CYCLOBENZAPRINE HCL PO Take 5 mg by mouth 3 times daily as needed for muscle spasms       DONEPEZIL HCL PO Take 5 mg by mouth daily       DULoxetine HCl (CYMBALTA PO) Take 30 mg by mouth daily       ESTRIOL 1MG/GRAM CREAM Apply 1 g topically See Admin Instructions Insert 1 application vaginally at bedtime every Mon, Wed, Fri for vaginal dryness, burning, itching apply pea size amount vaginally at bedtime three times a week       fluticasone (FLONASE) 50 MCG/ACT spray Spray 1 spray into both nostrils 2 times daily       fluticasone-vilanterol (BREO ELLIPTA) 100-25 MCG/INH oral inhaler Inhale 1 puff into the lungs daily       Furosemide (LASIX PO) Take 40 mg by mouth daily        FUROSEMIDE PO Take 20 mg by mouth daily as needed for weight gain daily for >2 pound weight gain in one day       GABAPENTIN PO Take 200 mg by mouth At Bedtime        guaiFENesin (MUCINEX) 600 MG 12 hr tablet Take 600 mg by mouth 2 times daily        guaiFENesin (ROBITUSSIN) 100 MG/5ML SYRP Take 5 mLs by mouth every 4 hours as needed for cough       HYDROcodone-acetaminophen (NORCO) 5-325 MG per tablet Take 1-2 tablets by mouth See Admin Instructions Give 1 tablet by mouth every 4 hours as needed for moderate to severe pain 1 tab for pain 1-5/10 AND Give 2 tablet by mouth every 4 hours as  needed for moderate to severe pain 2 tabs for pain 6-10/10       hydrocortisone 1 % CREA cream Apply topically every 6 hours as needed for itching       isosorbide mononitrate (IMDUR) 30 MG 24 hr tablet TAKE 1 TAB BY MOUTH ONCE DAILY FOR HYPERTENSION/HIGH BLOOD PRESSURE 30 tablet 3     isradipine (DYNACIRC) 5 MG capsule Take 5 mg by mouth 2 times daily       levalbuterol (XOPENEX) 1.25 MG/3ML neb solution Take 1 ampule by nebulization every 4 hours as needed for shortness of breath / dyspnea or wheezing       lisinopril (PRINIVIL,ZESTRIL) 30 MG tablet TAKE 1 TAB BY MOUTH ONCE DAILY FOR HYPERTENSION/HIGH BLOOD PRESSURE 30 tablet 3     loperamide (IMODIUM) 2 MG capsule Take 2 mg by mouth 4 times daily as needed for diarrhea       MAGNESIUM OXIDE PO Take 250 mg by mouth daily       memantine (NAMENDA) 5 MG tablet TAKE 1 TAB BY MOUTH ONCE DAILY FOR DEMENTIA 30 tablet 3     Menthol, Topical Analgesic, (BIOFREEZE) 4 % GEL Externally apply topically daily And Three times a day PRN       menthol-zinc oxide (CALMOSEPTINE) 0.44-20.625 % OINT ointment Apply topically 2 times daily as needed for skin protection       nystatin (MYCOSTATIN) 606954 UNIT/GM POWD Apply topically 2 times daily as needed        pantoprazole (PROTONIX) 40 MG EC tablet TAKE 1 TAB BY MOUTH ONCE DAILY BEFORE BREAKFAST FOR GERD 30 tablet 3     polyethylene glycol (MIRALAX/GLYCOLAX) Packet Take 1 packet by mouth daily       polyvinyl alcohol (LIQUIFILM TEARS) 1.4 % ophthalmic solution Place 1 drop into both eyes 2 times daily And 4 times a day PRN       senna-docusate (SENOKOT-S;PERICOLACE) 8.6-50 MG per tablet Take 1 tablet by mouth 2 times daily as needed        SIMETHICONE-80 PO Take 160 mg by mouth 4 times daily as needed for intestinal gas        SPIRONOLACTONE PO Take 25 mg by mouth daily       umeclidinium-vilanterol (ANORO ELLIPTA) 62.5-25 MCG/INH oral inhaler Inhale 1 puff into the lungs daily       Zinc 30 MG CAPS Take 30 mg by mouth daily    "      ROS:  10 point ROS of systems including Constitutional, Eyes, Respiratory, Cardiovascular, Gastroenterology, Genitourinary, Integumentary, Musculoskeletal, Psychiatric were all negative except for pertinent positives noted in my HPI.    Exam:  /62  Pulse 67  Temp 99  F (37.2  C)  Resp 20  Ht 5' 3\" (1.6 m)  Wt 193 lb (87.5 kg)  BMI 34.19 kg/m2  GENERAL APPEARANCE:  Alert, in no distress, oriented, cooperative  RESP:  respiratory effort and palpation of chest normal, lungs clear to auscultation , no respiratory distress, no cough noted, on RA  CV:  Palpation and auscultation of heart done , regular rate and rhythm, no murmur, rub, or gallop, +2 pedal pulses, peripheral edema 1-2+ in BLE  ABDOMEN:  normal bowel sounds, soft, nontender, no hepatosplenomegaly or other masses, nos-distended, no guarding or rebound  M/S:   Gait and station abnormal generalized weakness, ambulates with walker, small steps, mild tendernes to lumbar spine, decresed ROM to bialt shouders  SKIN:  Inspection of skin and subcutaneous tissue baseline  NEURO:   Cranial nerves 2-12 are normal tested and grossly at patient's baseline, Examination of sensation by touch normal  PSYCH:  oriented X 3, normal insight, judgement and memory, affect and mood normal    Lab/Diagnostic data:     CBC RESULTS:   Recent Labs   Lab Test  11/08/18   0737  06/04/18   0850   WBC  6.3  5.3   RBC  3.67*  3.70*   HGB  9.7*  9.7*   HCT  32.5*  32.7*   MCV  89  88   MCH  26.4*  26.2*   MCHC  29.8*  29.7*   RDW  17.0*  15.9*   PLT  186  172       Last Basic Metabolic Panel:  Recent Labs   Lab Test  11/08/18   0737  06/25/18   1020   NA  146*  144   POTASSIUM  4.4  4.8   CHLORIDE  109  108   BLAIR  8.9  9.2   CO2  29  30   BUN  37*  46*   CR  1.27*  1.36*   GLC  80  99       Liver Function Studies -   Recent Labs   Lab Test  06/25/18   1020   ALBUMIN  3.5       TSH   Date Value Ref Range Status   08/14/2017 0.62 0.40 - 4.00 mU/L Final   04/18/2007 0.60 0.4 - " 5.0 mU/L Final       Lab Results   Component Value Date    A1C 6.2 08/31/2017       ASSESSMENT/PLAN:  (M48.062) Spinal stenosis of lumbar region with neurogenic claudication  (primary encounter diagnosis)  (M25.552) Hip pain, left  (R53.81) Physical deconditioning  Comment: pain variable, mobility limited d/t pain, no saddle paresthesia  Plan: Continue add biofreeze, increase gabapetin to BID as below, add norco BID in addition to PRN, continue PRN APAP, ice/heat PRN, f/u with neurosurgery later today to determine surgery plan; vitamin C and zinc supplementation    (N17.9) Acute kidney injury (H)  (N18.3) CKD (chronic kidney disease) stage 3, GFR 30-59 ml/min (H)  Comment: baseline 1.1-1.3, 1.27 in TCU on 11/8  Plan: avoid nephrotoxic medications, BMP to check for stability, ok to discontinue renal diet and resume low sodium diet    (N28.1) Renal cyst  Comment: Complex right interpolar exophytic renal cyst which has a small internal solid nodular component seen on US/CT  Plan: follow up CT in 3 months, monitor    (J44.9) Chronic obstructive pulmonary disease, unspecified COPD type (H)  Comment: no s/s of exacerbation  Plan: continue Anoro Ellipta daily, levalbuterol PRN, Breo Ellipta I puff daily, PRN cough syrup, flonase daily monitor.     (E78.5) Hyperlipidemia, unspecified hyperlipidemia type  (I10) Benign essential hypertension  (I25.10) Coronary artery disease involving native coronary artery of native heart without angina pectoris  Comment: stable, asymptomatic  Plan: continue statin, baby ASA, lasix daily and PRN, isosorbide 30mg, isradipine, lisinopril, spironolactone, monitor .    (I50.9) Congestive heart failure, unspecified HF chronicity, unspecified heart failure type (H)  Comment: appears compensated  Plan: daily weights, low salt diet, lisinopril 30mg daily,spironolactone 25mg daily, lasix 40mg q AM and 20mg daily PRN for weight gain, .      (K21.9) Gastroesophageal reflux disease without  esophagitis  Comment: stable  Plan: continue protonix 40mg daily    (K59.01) Slow transit constipation  Comment: having loose stools  Plan: change miralax to PRN, hold senna    (E66.01) Morbid obesity (H)  Comment: BMI 34  Plan: activity as tolerated, dietary to follow    (M81.0) Age related osteoporosis, unspecified pathological fracture presence  Comment: history of fractures  Plan: continue calcium, vitamin D, fosamx on hold while in TCU    (F03.90) Dementia without behavioral disturbance, unspecified dementia type  Comment: stable, memory/judgement appear fairly intact  Plan: OT to complete cognitive testing, continue namenda, donepezil 5mg daily     (R11.2) Intractable vomiting with nausea, unspecified vomiting type  Comment: ? R/t pain medication, back pain?;  Does not appear infectious - only low grade fever x1 in TCU, WNC WNL, no abdominal pain, Loose stools like from laxative,  Plan: add PRN zofran, diet as tolerated, monitor.     Orders:  1. Biofreeze 4 or 5% apply TID to low back Dx: Pain  2. Increase Gabapentin 100 g PO Q Am, 200 mg PO at bedtime Dx: Spinal stenosis  3. Zofran ODT 4 mg tab 1 tab PO every 8 hours PRN Dx: Nausea  4. BMP, Hgb on 11/12 Dx: CKD, Anemia  5. Norco 5/325 tab take 1 tab PO BID and 1-2 tab PO Q 4 hours PRN with current pain scale no more than 2 tabs in 4 hours hold harleen sedation or confusion  6. Change miralax to 17 gm PO every day PRN  Dx: Constipation  7. Change Diet to 2 gram sodium, regular texture  8. Hold senna for loose stools      Total time spent with patient visit at the skilled nursing facility was 45 min including patient visit and review of past records. Greater than 50% of total time spent with counseling and coordinating care due to medication review discussion of plan of care and education givne spinal stenosis, CHF, COPD, nausea loose stools/constiaption    Electronically signed by:  MARY Gómez CNP      Sincerely,        MARY Gómez  CNP

## 2018-11-09 NOTE — PROGRESS NOTES
[facility]  PRIMARY CARE PROVIDER AND CLINIC:  Roby Willis PHYSICIAN [facility] [address] [facility] / [facility]*  Chief Complaint   Patient presents with     FPC Acute     [place] Medical Record Number:  [id number]  Place of Service where encounter took place:  [facility] ([facility]) [[id number]]    HPI:    Jazmin Clifton is a 85 year old  (12/30/1932),admitted to the above facility from  [facility].  Hospital stay 10/29/18 through 11/6/18.  Admitted to this facility for  rehab, medical management and nursing care.  HPI information obtained from: facility chart records, facility staff, patient report and [place] Epic chart review.      Hospital Summary:   Jazmin Clifton is a 85 y.o. Female with PMH of CKD, CHF, COPD, previous laminectomy, dementia and falls with left metatarsal fracture who was admitted 10/30/18 with back pain and found to have acute on chronic kidney disease which improved with IV fluids. MRI lumbar spine revealed moderate to marked canal stenosis at L1-L2 and spine was consulted, plan for outpatient surgery in the next week. She was given course of medrol. Recent UTI but UC done while IP was negative. Neurosurgery recommended starting zinc and vitamin C. Right renal cyst found on CT scan, f/u neuro imaging recommended in 3 months. Creatinine improved to baseline, PVR's checked, highest was 112. ACE and spironolactone held while IP, but resumed on discharge. Did  have some nausea while IP, was given zofran with improvement. When medically stable she was discharged to TCU for rehab and medical management with plan to f/u with neurosurgery on 11/9, likely surgery next week.        Current issues are:      Spinal stenosis of lumbar region with neurogenic claudication  Hip pain, left  Physical deconditioning  Reports no pain at rest, continues to have significant pain with movement, staff reports only minimal use of narcotics. She reports as  previously been on percocet but was changed to norco when she moved into [facility], and she does not know why. Staff did apply biofreeze to back earlier and she does think that it helped. She has completed course of steroids, thinks they helped some. She is on norco 1-2 tabs q4h PRN, APAP PRN, gabapentin 200mg at bedtime. She is working with PT and OT, denies any numbness to LE, but continues to have pain in left hip and leg. Ambulates with walker at baseline. Pain did not wake her from sleep last night and she reports that she slept well.    Gastroesophageal reflux disease without esophagitis  Intractable vomiting with nausea, unspecified vomiting type  Slow transit constipation  Reports she had 2-3 episodes of vomiting yesterday, not sure what caused it. Denies any abdominal pain, states the nausea started ~ 3 weeks ago when her back pain was increasing. She did have some constipation while IP, but now having loose stools (3 yesterday, 2 this AM) as was placed on aggressive bowel regimen. She is on Protonix, denies any indigestion or heartburn. Does not recall ever getting nausea with pain medication before.     Acute kidney injury (H)  CKD (chronic kidney disease) stage 3, GFR 30-59 ml/min (H)  Renal cyst  Denies any urgency, frequency or dysuria. Feels that she is emptying her bladder. T 99 this AM, denies any chills, night sweats, no CVA tenderness.     Chronic obstructive pulmonary disease, unspecified COPD type (H)  Hyperlipidemia, unspecified hyperlipidemia type  Benign essential hypertension  Coronary artery disease involving native coronary artery of native heart without angina pectoris  Congestive heart failure, unspecified HF chronicity, unspecified heart failure type (H  Morbid obesity (H)  Age related osteoporosis, unspecified pathological fracture presence  Dementia without behavioral disturbance, unspecified dementia type      CODE STATUS/ADVANCE DIRECTIVES DISCUSSION:   CPR/Full code   Patient's living  condition: lives in an assisted living facility    ALLERGIES:Accupril; Ace inhibitors; Augmentin; Levofloxacin; Macrobid [nitrofurantoin]; Morphine; Norvasc [amlodipine besylate]; and Quinapril  PAST MEDICAL HISTORY:  has a past medical history of ABDOMINAL PAIN GENERALIZED (3/15/2006); Abdominal pain, generalized (3/15/2006); Atherosclerosis of renal artery (H); BENIGN HYPERTENSION (5/1/2006); Benign neoplasm of scalp and skin of neck; Cerebral aneurysm, nonruptured; Depressive disorder, not elsewhere classified; Esophageal reflux; Female stress incontinence; Gastrointestinal malfunction arising from mental factors; Generalized osteoarthrosis, unspecified site; Herpes zoster dermatitis of eyelid; Insomnia, unspecified; Lumbago; Other chest pain; Other specified cardiac dysrhythmias(427.89); Rectocele; Unspecified disorder of kidney and ureter; and Unspecified essential hypertension.  PAST SURGICAL HISTORY:  has a past surgical history that includes surgical history of - ; surgical history of - ; surgical history of -  (1996); surgical history of - ; surgical history of - ; cholecystectomy, laporoscopic (1997); hysterectomy, mirtha (1982); and Endarterectomy carotid (Right, 8/31/2017).  FAMILY HISTORY: family history includes Asthma in her son; Cancer in her brother and mother; Cerebrovascular Disease in her father; Diabetes in her son; Eye Disorder in her son; GASTROINTESTINAL DISEASE in her son; HEART DISEASE in her father. There is no history of C.A.D., Hypertension, Breast Cancer, Cancer - colorectal, or Prostate Cancer.  SOCIAL HISTORY:  reports that she has quit smoking. She does not have any smokeless tobacco history on file. She reports that she drinks alcohol. She reports that she does not use illicit drugs.    Post Discharge Medication Reconciliation Status: discharge medications reconciled and changed, per note/orders (see AVS).  Current Outpatient Prescriptions   Medication Sig Dispense Refill      ACETAMINOPHEN PO Take 325 mg by mouth every 4 hours as needed        Ascorbic Acid (VITAMIN C PO) Take 500 mg by mouth daily       aspirin 81 MG chewable tablet Take 81 mg by mouth       atorvastatin (LIPITOR) 40 MG tablet TAKE 1 TAB BY MOUTH AT BEDTIME FOR HIGH CHOLESTEROL 30 tablet 3     B COMPLEX-C-E-ZN PO Take 1 tablet by mouth daily       CALCIUM 600+D 600-200 MG-UNIT TABS TAKE 1 TAB BY MOUTH ONCE DAILY 30 tablet 11     calcium carbonate (TUMS) 500 MG chewable tablet Take 1 chew tab by mouth every hour as needed for heartburn       CYCLOBENZAPRINE HCL PO Take 5 mg by mouth 3 times daily as needed for muscle spasms       DONEPEZIL HCL PO Take 5 mg by mouth daily       DULoxetine HCl (CYMBALTA PO) Take 30 mg by mouth daily       ESTRIOL 1MG/GRAM CREAM Apply 1 g topically See Admin Instructions Insert 1 application vaginally at bedtime every Mon, Wed, Fri for vaginal dryness, burning, itching apply pea size amount vaginally at bedtime three times a week       fluticasone (FLONASE) 50 MCG/ACT spray Spray 1 spray into both nostrils 2 times daily       fluticasone-vilanterol (BREO ELLIPTA) 100-25 MCG/INH oral inhaler Inhale 1 puff into the lungs daily       Furosemide (LASIX PO) Take 40 mg by mouth daily        FUROSEMIDE PO Take 20 mg by mouth daily as needed for weight gain daily for >2 pound weight gain in one day       GABAPENTIN PO Take 200 mg by mouth At Bedtime        guaiFENesin (MUCINEX) 600 MG 12 hr tablet Take 600 mg by mouth 2 times daily        guaiFENesin (ROBITUSSIN) 100 MG/5ML SYRP Take 5 mLs by mouth every 4 hours as needed for cough       HYDROcodone-acetaminophen (NORCO) 5-325 MG per tablet Take 1-2 tablets by mouth See Admin Instructions Give 1 tablet by mouth every 4 hours as needed for moderate to severe pain 1 tab for pain 1-5/10 AND Give 2 tablet by mouth every 4 hours as needed for moderate to severe pain 2 tabs for pain 6-10/10       hydrocortisone 1 % CREA cream Apply topically every 6  hours as needed for itching       isosorbide mononitrate (IMDUR) 30 MG 24 hr tablet TAKE 1 TAB BY MOUTH ONCE DAILY FOR HYPERTENSION/HIGH BLOOD PRESSURE 30 tablet 3     isradipine (DYNACIRC) 5 MG capsule Take 5 mg by mouth 2 times daily       levalbuterol (XOPENEX) 1.25 MG/3ML neb solution Take 1 ampule by nebulization every 4 hours as needed for shortness of breath / dyspnea or wheezing       lisinopril (PRINIVIL,ZESTRIL) 30 MG tablet TAKE 1 TAB BY MOUTH ONCE DAILY FOR HYPERTENSION/HIGH BLOOD PRESSURE 30 tablet 3     loperamide (IMODIUM) 2 MG capsule Take 2 mg by mouth 4 times daily as needed for diarrhea       MAGNESIUM OXIDE PO Take 250 mg by mouth daily       memantine (NAMENDA) 5 MG tablet TAKE 1 TAB BY MOUTH ONCE DAILY FOR DEMENTIA 30 tablet 3     Menthol, Topical Analgesic, (BIOFREEZE) 4 % GEL Externally apply topically daily And Three times a day PRN       menthol-zinc oxide (CALMOSEPTINE) 0.44-20.625 % OINT ointment Apply topically 2 times daily as needed for skin protection       nystatin (MYCOSTATIN) 468154 UNIT/GM POWD Apply topically 2 times daily as needed        pantoprazole (PROTONIX) 40 MG EC tablet TAKE 1 TAB BY MOUTH ONCE DAILY BEFORE BREAKFAST FOR GERD 30 tablet 3     polyethylene glycol (MIRALAX/GLYCOLAX) Packet Take 1 packet by mouth daily       polyvinyl alcohol (LIQUIFILM TEARS) 1.4 % ophthalmic solution Place 1 drop into both eyes 2 times daily And 4 times a day PRN       senna-docusate (SENOKOT-S;PERICOLACE) 8.6-50 MG per tablet Take 1 tablet by mouth 2 times daily as needed        SIMETHICONE-80 PO Take 160 mg by mouth 4 times daily as needed for intestinal gas        SPIRONOLACTONE PO Take 25 mg by mouth daily       umeclidinium-vilanterol (ANORO ELLIPTA) 62.5-25 MCG/INH oral inhaler Inhale 1 puff into the lungs daily       Zinc 30 MG CAPS Take 30 mg by mouth daily         ROS:  10 point ROS of systems including Constitutional, Eyes, Respiratory, Cardiovascular, Gastroenterology,  "Genitourinary, Integumentary, Musculoskeletal, Psychiatric were all negative except for pertinent positives noted in my HPI.    Exam:  /62  Pulse 67  Temp 99  F (37.2  C)  Resp 20  Ht 5' 3\" (1.6 m)  Wt 193 lb (87.5 kg)  BMI 34.19 kg/m2  GENERAL APPEARANCE:  Alert, in no distress, oriented, cooperative  RESP:  respiratory effort and palpation of chest normal, lungs clear to auscultation , no respiratory distress, no cough noted, on RA  CV:  Palpation and auscultation of heart done , regular rate and rhythm, no murmur, rub, or gallop, +2 pedal pulses, peripheral edema 1-2+ in BLE  ABDOMEN:  normal bowel sounds, soft, nontender, no hepatosplenomegaly or other masses, nos-distended, no guarding or rebound  M/S:   Gait and station abnormal generalized weakness, ambulates with walker, small steps, mild tendernes to lumbar spine, decresed ROM to bialt shouders  SKIN:  Inspection of skin and subcutaneous tissue baseline  NEURO:   Cranial nerves 2-12 are normal tested and grossly at patient's baseline, Examination of sensation by touch normal  PSYCH:  oriented X 3, normal insight, judgement and memory, affect and mood normal    Lab/Diagnostic data:     CBC RESULTS:   Recent Labs   Lab Test  11/08/18   0737  06/04/18   0850   WBC  6.3  5.3   RBC  3.67*  3.70*   HGB  9.7*  9.7*   HCT  32.5*  32.7*   MCV  89  88   MCH  26.4*  26.2*   MCHC  29.8*  29.7*   RDW  17.0*  15.9*   PLT  186  172       Last Basic Metabolic Panel:  Recent Labs   Lab Test  11/08/18   0737  06/25/18   1020   NA  146*  144   POTASSIUM  4.4  4.8   CHLORIDE  109  108   BLAIR  8.9  9.2   CO2  29  30   BUN  37*  46*   CR  1.27*  1.36*   GLC  80  99       Liver Function Studies -   Recent Labs   Lab Test  06/25/18   1020   ALBUMIN  3.5       TSH   Date Value Ref Range Status   08/14/2017 0.62 0.40 - 4.00 mU/L Final   04/18/2007 0.60 0.4 - 5.0 mU/L Final       Lab Results   Component Value Date    A1C 6.2 08/31/2017       ASSESSMENT/PLAN:  (M48.062) " Spinal stenosis of lumbar region with neurogenic claudication  (primary encounter diagnosis)  (M25.552) Hip pain, left  (R53.81) Physical deconditioning  Comment: pain variable, mobility limited d/t pain, no saddle paresthesia  Plan: Continue add biofreeze, increase gabapetin to BID as below, add norco BID in addition to PRN, continue PRN APAP, ice/heat PRN, f/u with neurosurgery later today to determine surgery plan; vitamin C and zinc supplementation    (N17.9) Acute kidney injury (H)  (N18.3) CKD (chronic kidney disease) stage 3, GFR 30-59 ml/min (H)  Comment: baseline 1.1-1.3, 1.27 in TCU on 11/8  Plan: avoid nephrotoxic medications, BMP to check for stability, ok to discontinue renal diet and resume low sodium diet    (N28.1) Renal cyst  Comment: Complex right interpolar exophytic renal cyst which has a small internal solid nodular component seen on US/CT  Plan: follow up CT in 3 months, monitor    (J44.9) Chronic obstructive pulmonary disease, unspecified COPD type (H)  Comment: no s/s of exacerbation  Plan: continue Anoro Ellipta daily, levalbuterol PRN, Breo Ellipta I puff daily, PRN cough syrup, flonase daily monitor.     (E78.5) Hyperlipidemia, unspecified hyperlipidemia type  (I10) Benign essential hypertension  (I25.10) Coronary artery disease involving native coronary artery of native heart without angina pectoris  Comment: stable, asymptomatic  Plan: continue statin, baby ASA, lasix daily and PRN, isosorbide 30mg, isradipine, lisinopril, spironolactone, monitor .    (I50.9) Congestive heart failure, unspecified HF chronicity, unspecified heart failure type (H)  Comment: appears compensated  Plan: daily weights, low salt diet, lisinopril 30mg daily,spironolactone 25mg daily, lasix 40mg q AM and 20mg daily PRN for weight gain, .      (K21.9) Gastroesophageal reflux disease without esophagitis  Comment: stable  Plan: continue protonix 40mg daily    (K59.01) Slow transit constipation  Comment: having loose  stools  Plan: change miralax to PRN, hold senna    (E66.01) Morbid obesity (H)  Comment: BMI 34  Plan: activity as tolerated, dietary to follow    (M81.0) Age related osteoporosis, unspecified pathological fracture presence  Comment: history of fractures  Plan: continue calcium, vitamin D, fosamx on hold while in TCU    (F03.90) Dementia without behavioral disturbance, unspecified dementia type  Comment: stable, memory/judgement appear fairly intact  Plan: OT to complete cognitive testing, continue namenda, donepezil 5mg daily     (R11.2) Intractable vomiting with nausea, unspecified vomiting type  Comment: ? R/t pain medication, back pain?;  Does not appear infectious - only low grade fever x1 in TCU, WNC WNL, no abdominal pain, Loose stools like from laxative,  Plan: add PRN zofran, diet as tolerated, monitor.     Orders:  1. Biofreeze 4 or 5% apply TID to low back Dx: Pain  2. Increase Gabapentin 100 g PO Q Am, 200 mg PO at bedtime Dx: Spinal stenosis  3. Zofran ODT 4 mg tab 1 tab PO every 8 hours PRN Dx: Nausea  4. BMP, Hgb on 11/12 Dx: CKD, Anemia  5. Norco 5/325 tab take 1 tab PO BID and 1-2 tab PO Q 4 hours PRN with current pain scale no more than 2 tabs in 4 hours hold harleen sedation or confusion  6. Change miralax to 17 gm PO every day PRN  Dx: Constipation  7. Change Diet to 2 gram sodium, regular texture  8. Hold senna for loose stools      Total time spent with patient visit at the skilled nursing facility was 45 min including patient visit and review of past records. Greater than 50% of total time spent with counseling and coordinating care due to medication review discussion of plan of care and education givne spinal stenosis, CHF, COPD, nausea loose stools/constiaption    Electronically signed by:  MARY Gómez CNP

## 2018-11-12 ENCOUNTER — HOSPITAL LABORATORY (OUTPATIENT)
Facility: OTHER | Age: 83
End: 2018-11-12

## 2018-11-12 LAB
ANION GAP SERPL CALCULATED.3IONS-SCNC: 5 MMOL/L (ref 3–14)
BUN SERPL-MCNC: 42 MG/DL (ref 7–30)
CALCIUM SERPL-MCNC: 9.2 MG/DL (ref 8.5–10.1)
CHLORIDE SERPL-SCNC: 103 MMOL/L (ref 94–109)
CO2 SERPL-SCNC: 31 MMOL/L (ref 20–32)
CREAT SERPL-MCNC: 1.33 MG/DL (ref 0.52–1.04)
GFR SERPL CREATININE-BSD FRML MDRD: 38 ML/MIN/1.7M2
GLUCOSE SERPL-MCNC: 80 MG/DL (ref 70–99)
HGB BLD-MCNC: 9.5 G/DL (ref 11.7–15.7)
POTASSIUM SERPL-SCNC: 4.7 MMOL/L (ref 3.4–5.3)
SODIUM SERPL-SCNC: 139 MMOL/L (ref 133–144)

## 2018-11-13 ENCOUNTER — RECORDS - HEALTHEAST (OUTPATIENT)
Dept: ADMINISTRATIVE | Facility: OTHER | Age: 83
End: 2018-11-13

## 2018-11-13 ENCOUNTER — NURSING HOME VISIT (OUTPATIENT)
Dept: GERIATRICS | Facility: CLINIC | Age: 83
End: 2018-11-13
Payer: COMMERCIAL

## 2018-11-13 VITALS
HEIGHT: 63 IN | WEIGHT: 195 LBS | DIASTOLIC BLOOD PRESSURE: 78 MMHG | SYSTOLIC BLOOD PRESSURE: 148 MMHG | TEMPERATURE: 98.4 F | RESPIRATION RATE: 18 BRPM | HEART RATE: 60 BPM | BODY MASS INDEX: 34.55 KG/M2

## 2018-11-13 DIAGNOSIS — J44.9 CHRONIC OBSTRUCTIVE PULMONARY DISEASE, UNSPECIFIED COPD TYPE (H): ICD-10-CM

## 2018-11-13 DIAGNOSIS — N18.30 CKD (CHRONIC KIDNEY DISEASE) STAGE 3, GFR 30-59 ML/MIN (H): ICD-10-CM

## 2018-11-13 DIAGNOSIS — I50.9 CONGESTIVE HEART FAILURE, UNSPECIFIED HF CHRONICITY, UNSPECIFIED HEART FAILURE TYPE (H): ICD-10-CM

## 2018-11-13 DIAGNOSIS — I10 BENIGN ESSENTIAL HYPERTENSION: ICD-10-CM

## 2018-11-13 DIAGNOSIS — R19.09 MASS OF RIGHT INGUINAL REGION: ICD-10-CM

## 2018-11-13 DIAGNOSIS — Z87.440 RECENT URINARY TRACT INFECTION: ICD-10-CM

## 2018-11-13 DIAGNOSIS — M48.062 SPINAL STENOSIS OF LUMBAR REGION WITH NEUROGENIC CLAUDICATION: ICD-10-CM

## 2018-11-13 DIAGNOSIS — Z01.818 PRE-OP EXAM: Primary | ICD-10-CM

## 2018-11-13 PROCEDURE — 99310 SBSQ NF CARE HIGH MDM 45: CPT | Performed by: NURSE PRACTITIONER

## 2018-11-13 NOTE — PROGRESS NOTES
PRE-OP EVALUATION:    [facility] Record Number:  [id number]  Place of Service where encounter took place:  [facility] [place] ([facility]) [[id number]]  Today's date: 2018    [facility]  [address] [address]  [phone]  Dept: [phone]    PRE-OP EVALUATION:  Today's date: 2018    Jazmin Clifton (: 1932) presents for pre-operative evaluation assessment as requested by Dr. Morris.  She requires evaluation and anesthesia risk assessment prior to undergoing surgery/procedure for treatment of lumbar spinal stenosis .    Proposed Surgery/ Procedure: L1-2 decompression  Date of Surgery/ Procedure: Cancelled  Time of Surgery/ Procedure:   Hospital/Surgical Facility:   Surgery cancelled  Primary Physician: Roby Willis  Type of Anesthesia Anticipated: General    Patient has a Health Care Directive or Living Will:  YES : Full code    1. YES - DO YOU HAVE A HISTORY OF HEART ATTACK, STROKE, STENT, BYPASS OR SURGERY ON AN ARTERY IN THE HEAD, NECK, HEART OR LEG? History of CVA, MCA stent  2. NO - Do you ever have any pain or discomfort in your chest?  3. YES - DO YOU HAVE A HISTORY OF HEART FAILURE CHF Echo 2017 showed EF of 60-65%, compensated  4. YES - ARE YOUR TROUBLED BY SHORTNESS OF BREATH WHEN WALKING ON THE LEVEL, UP A SLIGHT HILL OR AT NIGHT? SOB walking up hill, has COPD  5. NO - Do you currently have a cold, bronchitis or other respiratory infection?  6. YES - DO YOU HAVE A COUGH, SHORTNESS OF BREATH OR WHEEZING? Chronic COPD cough  7. NO - Do you sometimes get pains in the calves of your legs when you walk?  8. NO - Do you or anyone in your family have previous history of blood clots?  9. YES - DO YOU OR DOES ANYONE IN YOUR FAMILY HAVE A SERIOUS BLEEDING PROBLEM SUCH AS PROLONGED BLEEDING FOLLOWING SURGERIES OR CUTS? Mother had problems, patient does not  10. YES - HAVE YOU EVER HAD PROBLEMS WITH ANEMIA OR BEEN TOLD TO TAKE IRON  PILLS? On Vitamin B supplement  11. NO - Have you had any abnormal blood loss such as black, tarry or bloody stools, or abnormal vaginal bleeding?  12. NO - Have you ever had a blood transfusion?  13. NO - Have you or any of your relatives ever had problems with anesthesia?  14. NO - Do you have sleep apnea, excessive snoring or daytime drowsiness?  15. NO - Do you have any prosthetic heart valves?  16. YES - DO YOU HAVE PROSTHETIC JOINTS? Left hip  17. NO - Is there any chance that you may be pregnant?      HPI:     HPI related to upcoming procedure: Jazmin Clifton is a 85 y.o. Female with PMH of CKD, CHF, COPD, previous laminectomy, dementia and falls with left metatarsal fracture who was admitted 10/30/18 with back pain. MRI lumbar spine revealed moderate to marked canal stenosis at L1-L2 and neurosurgery was consulted. She was started on medrol taper, zinc and vitamin C. Neurosurgery recommended L1-2 decompression, planning for surgery in the next week.       ANEMIA - Patient has a recent history of moderate-severe anemia, which has not been symptomatic. Anemia chronic. Treatment has been vitamin B supplement. .                                                                                                                                            .  CHF - Patient has a longstanding history of moderate-severe CHF. Exacerbating conditions include hypertension and COPD. Currently the patient's condition is same. Current treatment regimen includes ACE inhibitor, long acting nitrate, ASA, diuretic and spirolactone. The patient denies chest pain, edema, orthopnea, SOB or recent weight gain. Last Echocardiogram 7/2017 - EF 60-65%, EKG 11/5/18: NSR, no changes from previous EKG                                                                                                                                                                               .  COPD - Patient has a longstanding history of moderate-severe COPD .  Patient has been doing well overall noting NO SYMPTOMS and continues on medication regimen consisting of Breo Ellipta,, Anora Ellipta, levalbuterol PRN without adverse reactions or side effects.                                                                                                           .  DEPRESSION - Patient has a long history of Depression of moderate severity requiring medication for control with recent symptoms being stable..Current symptoms of depression include none.                                                                                                                                                                                    .  HYPERLIPIDEMIA - Patient has a long history of significant Hyperlipidemia requiring medication for treatment with recent good control. Patient reports no problems or side effects with the medication.                                                                                                                                                       .  HYPERTENSION - Patient has longstanding history of HTN , currently denies any symptoms referable to elevated blood pressure. Specifically denies chest pain, palpitations, dyspnea, orthopnea, PND or peripheral edema. Blood pressure readings have been in normal range. Current medication regimen is as listed below. Patient denies any side effects of medication.                                                                                                                                                                                          .  RENAL INSUFFICIENCY - Patient has a longstanding history of moderate-severe chronic renal insufficiency. Last Cr 1.33.                                                                                                                                                                               .  RECENT UTI: Urine culture on 10/30 negative, currently asymptomatic.      RIGHT GROIN MASS: Reports increased right inguinal pain, palpable mass to right groin. Has previously has cysts to this area surgically removed. Reports she has been told that there was some damage done to lymph nodes in the area after previous surgery. Renal cysts noted on recent CT scan. Appointment made with general surgery for evaluation of mass on 11/16; ultrasound ordered for today in TCU. Has been afebrile, but will check CBC.     MEDICAL HISTORY:     Patient Active Problem List    Diagnosis Date Noted     Morbid obesity (H) 06/19/2018     Priority: Medium     Mild cognitive impairment 09/22/2017     Priority: Medium     S/P carotid endarterectomy 09/06/2017     Priority: Medium     Carotid stenosis, symptomatic w/o infarct, right 08/31/2017     Priority: Medium     Thyroid nodule 08/23/2017     Priority: Medium     Trigger point of extremity 08/23/2017     Priority: Medium     Trochanteric bursitis of right hip 08/23/2017     Priority: Medium     Urinary tract infection without hematuria, site unspecified 08/23/2017     Priority: Medium     CKD (chronic kidney disease) stage 3, GFR 30-59 ml/min (H) 08/16/2017     Priority: Medium     ACP (advance care planning) 08/03/2017     Priority: Medium     8/3/2017  Recommend Code Status in chart and patient's Advance Care Planning documents be reviewed to ensure alignment with patient's current wishes.  Most recent Advance Care Planning document is a POLST dated 7/24/17 indicating DNI.  Agnieszka Santillan,  Advance Care Planning Liaison           Transient cerebral ischemia, unspecified type 08/01/2017     Priority: Medium     Carotid artery stenosis, symptomatic, right 08/01/2017     Priority: Medium     CVA (cerebral vascular accident) (H) 07/26/2017     Priority: Medium     Chronic obstructive pulmonary disease, unspecified COPD type (H) 07/25/2017     Priority: Medium     Generalized muscle weakness 07/25/2017     Priority: Medium     Dizziness 07/25/2017      Priority: Medium     Osteoarthritis of right hip, unspecified osteoarthritis type 07/25/2017     Priority: Medium     Hypotension, unspecified hypotension type 07/25/2017     Priority: Medium     Hip joint replacement status 04/18/2012     Priority: Medium     Osteoarthritis 04/18/2012     Priority: Medium     DDD (degenerative disc disease), lumbar 04/18/2012     Priority: Medium     Mild major depression (H) 04/18/2012     Priority: Medium     Incontinence of urine 04/18/2012     Priority: Medium     Osteoporosis 10/25/2011     Priority: Medium     S/P laminectomy 10/25/2011     Priority: Medium     CARDIOVASCULAR SCREENING; LDL GOAL LESS THAN 100 10/31/2010     Priority: Medium     CHF (congestive heart failure) (H) 09/17/2008     Priority: Medium     Diastolic disfunction - ECHP 2008       Restrictive lung disease 09/17/2008     Priority: Medium     PFT 4/2008       Renovascular hypertension 11/09/2006     Priority: Medium     Problem list name updated by automated process. Provider to review       Benign neoplasm of colon 10/04/2006     Priority: Medium     Angiodysplasia - due for repeat 10 years.  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 NO edema. Continue current Therapy.HYPERTENSION: Dynacirc CR 10mg daily initiated today. Will continue adjusting blood pressure medicatins as needed until readings at target.VITAMIN D  DEFICIENCY - Completed therapy, discontinue ergocalciferol.ANEMIA, EPO DEFICIENCY - Hgb 10.2. Not on EPO. Continue observation for now. Recnet Hgb drop due to lumbar laminectomy.  Problem list name updated by automated  "process. Provider to review       Essential hypertension, benign 05/01/2006     Priority: Medium     Atherosclerosis of renal artery (H)      Priority: Medium     Left renal artery stenosis       Esophageal reflux      Priority: Medium     Gastroesophageal Reflux Disease       Cerebral aneurysm, nonruptured      Priority: Medium     Cerebral Aneurysm - unchanged on rcent MRI.  Seen by neurosurg.  [place] she should have follow up MRA every 2 years (due 2010)       Other specified cardiac dysrhythmias(427.89)      Priority: Medium     Bradycardia, improved on lower dose beta blockers         Past Medical History:   Diagnosis Date     ABDOMINAL PAIN GENERALIZED 3/15/2006    1 month of abdominal pain that bryn radiates into her back and chest.  Pt was hospitilzed 2x for the pain at [place] [facility] --pt hopsitized for 3 days.  Rcords not ab=vailable.  She was told either \" twisted intestine or blockage of bowel.  Pt feels it is the hiatal hernia.  Pt hospitilized again a second time  A month ago.  Ct scans x 2 showed \" nothing.\"  Pt had a barium and xrays.  Pt sa     Abdominal pain, generalized 3/15/2006    1 month of abdominal pain that llqwhich radiates into her back and chest.  Pt was hospitilzed 2x for the pain at [place] [facility] --pt hopsitized for 3 days.  Rcords not ab=vailable.  She was told either \" twisted intestine or blockage of bowel.  Pt feels it is the hiatal hernia.  Pt hospitilized again a second time  A month ago.  Ct scans x 2 showed \" nothing.\"  Pt had a barium and xrays.  Pt sa     Atherosclerosis of renal artery (H)     Left renal artery stenosis     BENIGN HYPERTENSION 5/1/2006 5/1/2006   Increase catapres to 0.3 mg and decrease clonidine to 0.1 mg daily.  Recheck bp in 1 month.      Benign neoplasm of scalp and skin of neck     Seborrheic keratosis     Cerebral aneurysm, nonruptured     Cerebral Aneurysm     Depressive disorder, not elsewhere classified     Depression     Esophageal " reflux     Gastroesophageal Reflux Disease     Female stress incontinence      Gastrointestinal malfunction arising from mental factors     Dyspepsia     Generalized osteoarthrosis, unspecified site     DJD-chronic back pain     Herpes zoster dermatitis of eyelid      Insomnia, unspecified      Lumbago     Chronic back pain     Other chest pain     Atypical Chest Pain     Other specified cardiac dysrhythmias(427.89)     Bradycardia, improved on lower dose beta blockers      Rectocele     Grade 3     Unspecified disorder of kidney and ureter     Renal insufficiency     Unspecified essential hypertension      Past Surgical History:   Procedure Laterality Date     CHOLECYSTECTOMY, LAPOROSCOPIC  1997    Cholecystectomy, Laparoscopic     ENDARTERECTOMY CAROTID Right 8/31/2017    Procedure: ENDARTERECTOMY CAROTID;  RIGHT CAROTID ENDARTERECTOMY WITH EEG;  Surgeon: Hilario Parry MD;  Location: [facility]     HYSTERECTOMY, RAYMOND  1982    oophorectomy,RAYMOND     SURGICAL HISTORY OF -       Laminectomy x 3     SURGICAL HISTORY OF -       MCA Aneurysm repair     SURGICAL HISTORY OF -   1996    Bladder suspension (Bladder Repair x2)     SURGICAL HISTORY OF -       Bilateral Hand Surgeries for Arthritis x2     SURGICAL HISTORY OF -       Repair of a Cerebral Aneurysm     Current Outpatient Prescriptions   Medication Sig Dispense Refill     ACETAMINOPHEN PO Take 325 mg by mouth every 4 hours as needed        Ascorbic Acid (VITAMIN C PO) Take 500 mg by mouth daily       aspirin 81 MG chewable tablet Take 81 mg by mouth       atorvastatin (LIPITOR) 40 MG tablet TAKE 1 TAB BY MOUTH AT BEDTIME FOR HIGH CHOLESTEROL 30 tablet 3     B COMPLEX-C-E-ZN PO Take 1 tablet by mouth daily       CALCIUM 600+D 600-200 MG-UNIT TABS TAKE 1 TAB BY MOUTH ONCE DAILY 30 tablet 11     calcium carbonate (TUMS) 500 MG chewable tablet Take 1 chew tab by mouth every hour as needed for heartburn       CYCLOBENZAPRINE HCL PO Take 5 mg by mouth 3 times daily as  needed for muscle spasms       DONEPEZIL HCL PO Take 5 mg by mouth daily       DULoxetine HCl (CYMBALTA PO) Take 30 mg by mouth daily       ESTRIOL 1MG/GRAM CREAM Apply 1 g topically See Admin Instructions Insert 1 application vaginally at bedtime every Mon, Wed, Fri for vaginal dryness, burning, itching apply pea size amount vaginally at bedtime three times a week       fluticasone (FLONASE) 50 MCG/ACT spray Spray 1 spray into both nostrils 2 times daily       fluticasone-vilanterol (BREO ELLIPTA) 100-25 MCG/INH oral inhaler Inhale 1 puff into the lungs daily       Furosemide (LASIX PO) Take 40 mg by mouth daily        FUROSEMIDE PO Take 20 mg by mouth daily as needed for weight gain daily for >2 pound weight gain in one day       GABAPENTIN PO Take 200 mg by mouth At Bedtime        guaiFENesin (MUCINEX) 600 MG 12 hr tablet Take 600 mg by mouth 2 times daily        guaiFENesin (ROBITUSSIN) 100 MG/5ML SYRP Take 5 mLs by mouth every 4 hours as needed for cough       HYDROcodone-acetaminophen (NORCO) 5-325 MG per tablet Take 1-2 tablets by mouth See Admin Instructions Give 1 tablet by mouth every 4 hours as needed for moderate to severe pain 1 tab for pain 1-5/10 AND Give 2 tablet by mouth every 4 hours as needed for moderate to severe pain 2 tabs for pain 6-10/10       hydrocortisone 1 % CREA cream Apply topically every 6 hours as needed for itching       isosorbide mononitrate (IMDUR) 30 MG 24 hr tablet TAKE 1 TAB BY MOUTH ONCE DAILY FOR HYPERTENSION/HIGH BLOOD PRESSURE 30 tablet 3     isradipine (DYNACIRC) 5 MG capsule Take 5 mg by mouth 2 times daily       levalbuterol (XOPENEX) 1.25 MG/3ML neb solution Take 1 ampule by nebulization every 4 hours as needed for shortness of breath / dyspnea or wheezing       lisinopril (PRINIVIL,ZESTRIL) 30 MG tablet TAKE 1 TAB BY MOUTH ONCE DAILY FOR HYPERTENSION/HIGH BLOOD PRESSURE 30 tablet 3     loperamide (IMODIUM) 2 MG capsule Take 2 mg by mouth 4 times daily as needed for  diarrhea       MAGNESIUM OXIDE PO Take 250 mg by mouth daily       Menthol, Topical Analgesic, (BIOFREEZE) 4 % GEL Externally apply topically daily And Three times a day PRN       menthol-zinc oxide (CALMOSEPTINE) 0.44-20.625 % OINT ointment Apply topically 2 times daily as needed for skin protection       methylPREDNISolone (MEDROL DOSEPAK) 4 MG tablet Take 4 mg by mouth daily follow package directions       nystatin (MYCOSTATIN) 835090 UNIT/GM POWD Apply topically 2 times daily as needed        Ondansetron (ZOFRAN ODT PO) Take 4 mg by mouth every 8 hours as needed for nausea       pantoprazole (PROTONIX) 40 MG EC tablet TAKE 1 TAB BY MOUTH ONCE DAILY BEFORE BREAKFAST FOR GERD 30 tablet 3     polyethylene glycol (MIRALAX/GLYCOLAX) Packet Take 1 packet by mouth daily       polyvinyl alcohol (LIQUIFILM TEARS) 1.4 % ophthalmic solution Place 1 drop into both eyes 2 times daily And 4 times a day PRN       senna-docusate (SENOKOT-S;PERICOLACE) 8.6-50 MG per tablet Take 1 tablet by mouth daily        SIMETHICONE-80 PO Take 160 mg by mouth 4 times daily as needed for intestinal gas        SPIRONOLACTONE PO Take 25 mg by mouth daily       umeclidinium-vilanterol (ANORO ELLIPTA) 62.5-25 MCG/INH oral inhaler Inhale 1 puff into the lungs daily       Zinc 30 MG CAPS Take 30 mg by mouth daily       memantine (NAMENDA) 5 MG tablet TAKE 1 TAB BY MOUTH ONCE DAILY FOR DEMENTIA 30 tablet 3     OTC products: None, except as noted above: See current medication list    Allergies   Allergen Reactions     Accupril      Ace Inhibitors Unknown     Accupril     Augmentin Unknown     Levofloxacin Unknown     Macrobid [Nitrofurantoin]      Morphine Other (See Comments)     hallucinations     Norvasc [Amlodipine Besylate]      Leg swelling       Quinapril       Latex Allergy: NO    Social History   Substance Use Topics     Smoking status: Former Smoker     Quit date: 1/1/1992     Smokeless tobacco: Not on file     Alcohol use Yes      Comment:  "wine occas.     History   Drug Use No       REVIEW OF SYSTEMS:   Positive selected, otherwise negative, RESPIRATORY: cough and dyspnea on exertion, :  incontinence, GI:  nausea and vomiting, MUSCULOSKELETAL: back pain and HEME/LYMPH: right groin mass, painful, tender    EXAM:   /78  Pulse 60  Temp 98.4  F (36.9  C)  Resp 18  Ht 5' 3\" (1.6 m)  Wt 195 lb (88.5 kg)  BMI 34.54 kg/m2  GENERAL APPEARANCE:  Alert, in no distress, oriented, cooperative  RESP:  respiratory effort and palpation of chest normal, lungs clear to auscultation , no respiratory distress  CV:  Palpation and auscultation of heart done , regular rate and rhythm, no murmur, rub, or gallop, no edema, +2 pedal pulses  ABDOMEN:  normal bowel sounds, soft, nontender, no hepatosplenomegaly or other masses, large, round  M/S:   palpable, likely fluid filled mass to right groin, moderately tendernes to groin and right thigh. tendernes to lumbar spine, mild warmth to right thigh  SKIN:  Inspection of skin and subcutaneous tissue baseline  NEURO:   Cranial nerves 2-12 are normal tested and grossly at patient's baseline  PSYCH:  oriented X 3, normal insight, judgement and memory, affect and mood normal    DIAGNOSTICS:     EKG: appears normal, NSR, normal axis, normal intervals, no acute ST/T changes c/w ischemia, no LVH by voltage criteria, unchanged from previous tracings  Hemoglobin (indicated for history of anemia or procedure with significant blood loss such as tonsillectomy, major intraperitoneal surgery, vascular surgery, major spine surgery, total joint replacement)  Serum Potassium  Serum Creatinine  Labs Drawn and in Process: CBC; US of mass to right groin    Recent Labs   Lab Test  11/12/18   0738  11/08/18   0737   06/04/18   0850   08/31/17   1113   07/26/17   1130   HGB  9.5*  9.7*   --   9.7*   < >   --    < >  11.0*   PLT   --   186   --   172   < >  172   < >  156   INR   --    --    --    --    --    --    --   1.07   NA  139  146* "   < >  142   < >  141   < >  137   POTASSIUM  4.7  4.4   < >  4.2   < >  5.3   < >  4.0   CR  1.33*  1.27*   < >  1.38*   < >  1.42*   < >  1.50*   A1C   --    --    --    --    --   6.2*   --    --     < > = values in this interval not displayed.        IMPRESSION:   Reason for surgery/procedure: lumbar spinal stenosis  Diagnosis/reason for consult: lumbar spinal stenosis, lumbago    The proposed surgical procedure is considered INTERMEDIATE risk.    REVISED CARDIAC RISK INDEX  The patient has the following serious cardiovascular risks for perioperative complications such as (MI, PE, VFib and 3  AV Block):  Congestive Heart Failure (pulmonary edema, PND, s3 holli, CXR with pulmonary congestion, basilar rales)  Cerebrovascular Disease (TIA or CVA)  INTERPRETATION: 2 risks: Class III (moderate risk - 6.6% complication rate)    The patient has the following additional risks for perioperative complications:  Delirium or Dementia  Morbid obesity      ICD-10-CM    1. Spinal stenosis of lumbar region with neurogenic claudication M48.062    2. Mass of right inguinal region R19.09    3. CKD (chronic kidney disease) stage 3, GFR 30-59 ml/min (H) N18.3    4. Chronic obstructive pulmonary disease, unspecified COPD type (H) J44.9    5. Congestive heart failure, unspecified HF chronicity, unspecified heart failure type (H) I50.9    6. Benign essential hypertension I10    7. Recent urinary tract infection Z87.440        ORDERS:     --Consult hospital rounder / IM to assist post-op medical management    Cardiovascular Risk  Beta blockers contraindicated due to h/o bradycardia      Pulmonary Risk  Incentive spirometry post op  Maximize COPD treatment: Done, on  Breo Ellipta and Anoro Ellipta inhalers,       --Patient is to take all scheduled medications on the day of surgery EXCEPT for modifications listed below.    Anticoagulant or Antiplatelet Medication Use  ASPIRIN: Discontinue ASA 7-10 days prior to procedure to reduce  bleeding risk.  It should be resumed post-operatively.        ACE Inhibitor or Angiotensin Receptor Blocker (ARB) Use  Ace inhibitor or Angiotensin Receptor Blocker (ARB) and will continue this medication due to the higher risk of uncontrolled perioperative hypertension (e.g. neurosurgical procedure)      APPROVAL NOT GIVEN to proceed with proposed procedure until has been evaluated by general surgery for treatment of likely right inguinal seroma.     UPDATE: Ultrasound showed seroma to right groin WBC WNL. Neurosurgery updated given seroma seen on right groin ultrasound and apt with general surgery on 11/16. They cancelled plans for current spinal decompression at this time, recommend treatment for seroma first and to rescheduled surgery after she has recovered from seroma treatment.        Signed Electronically by: MARY Gómez CNP    Copy of this evaluation report is provided to requesting physician.    Chip Preop Guidelines    Revised Cardiac Risk Index

## 2018-11-14 ENCOUNTER — HOSPITAL LABORATORY (OUTPATIENT)
Facility: OTHER | Age: 83
End: 2018-11-14

## 2018-11-14 ENCOUNTER — COMMUNICATION - HEALTHEAST (OUTPATIENT)
Dept: NEUROSURGERY | Facility: CLINIC | Age: 83
End: 2018-11-14

## 2018-11-14 LAB
ERYTHROCYTE [DISTWIDTH] IN BLOOD BY AUTOMATED COUNT: 16.8 % (ref 10–15)
HCT VFR BLD AUTO: 32.9 % (ref 35–47)
HGB BLD-MCNC: 9.8 G/DL (ref 11.7–15.7)
MCH RBC QN AUTO: 26.5 PG (ref 26.5–33)
MCHC RBC AUTO-ENTMCNC: 29.8 G/DL (ref 31.5–36.5)
MCV RBC AUTO: 89 FL (ref 78–100)
PLATELET # BLD AUTO: 193 10E9/L (ref 150–450)
RBC # BLD AUTO: 3.7 10E12/L (ref 3.8–5.2)
WBC # BLD AUTO: 7.6 10E9/L (ref 4–11)

## 2018-11-16 ENCOUNTER — OFFICE VISIT - HEALTHEAST (OUTPATIENT)
Dept: SURGERY | Facility: CLINIC | Age: 83
End: 2018-11-16

## 2018-11-16 DIAGNOSIS — M79.89 RIGHT LEG SWELLING: ICD-10-CM

## 2018-11-16 DIAGNOSIS — I89.0 LYMPHEDEMA: ICD-10-CM

## 2018-11-16 ASSESSMENT — MIFFLIN-ST. JEOR: SCORE: 1260.96

## 2018-11-20 ENCOUNTER — NURSING HOME VISIT (OUTPATIENT)
Dept: GERIATRICS | Facility: CLINIC | Age: 83
End: 2018-11-20
Payer: COMMERCIAL

## 2018-11-20 VITALS
TEMPERATURE: 98.6 F | RESPIRATION RATE: 20 BRPM | HEART RATE: 57 BPM | WEIGHT: 192.4 LBS | OXYGEN SATURATION: 93 % | DIASTOLIC BLOOD PRESSURE: 56 MMHG | BODY MASS INDEX: 34.08 KG/M2 | SYSTOLIC BLOOD PRESSURE: 127 MMHG

## 2018-11-20 DIAGNOSIS — F41.8 DEPRESSION WITH ANXIETY: ICD-10-CM

## 2018-11-20 DIAGNOSIS — Z86.73 H/O: CVA (CEREBROVASCULAR ACCIDENT): ICD-10-CM

## 2018-11-20 DIAGNOSIS — J44.9 CHRONIC OBSTRUCTIVE PULMONARY DISEASE, UNSPECIFIED COPD TYPE (H): ICD-10-CM

## 2018-11-20 DIAGNOSIS — R53.81 PHYSICAL DECONDITIONING: ICD-10-CM

## 2018-11-20 DIAGNOSIS — R60.0 BILATERAL LEG EDEMA: ICD-10-CM

## 2018-11-20 DIAGNOSIS — F03.90 DEMENTIA WITHOUT BEHAVIORAL DISTURBANCE, UNSPECIFIED DEMENTIA TYPE: ICD-10-CM

## 2018-11-20 DIAGNOSIS — K59.01 SLOW TRANSIT CONSTIPATION: ICD-10-CM

## 2018-11-20 DIAGNOSIS — I25.10 CORONARY ARTERY DISEASE INVOLVING NATIVE CORONARY ARTERY OF NATIVE HEART WITHOUT ANGINA PECTORIS: ICD-10-CM

## 2018-11-20 DIAGNOSIS — I10 BENIGN ESSENTIAL HYPERTENSION: ICD-10-CM

## 2018-11-20 DIAGNOSIS — M48.061 SPINAL STENOSIS OF LUMBAR REGION, UNSPECIFIED WHETHER NEUROGENIC CLAUDICATION PRESENT: Primary | ICD-10-CM

## 2018-11-20 DIAGNOSIS — K21.9 GASTROESOPHAGEAL REFLUX DISEASE WITHOUT ESOPHAGITIS: ICD-10-CM

## 2018-11-20 PROCEDURE — 99306 1ST NF CARE HIGH MDM 50: CPT | Performed by: INTERNAL MEDICINE

## 2018-11-20 RX ORDER — BISACODYL 10 MG
10 SUPPOSITORY, RECTAL RECTAL DAILY PRN
COMMUNITY
End: 2018-12-07

## 2018-11-21 ENCOUNTER — COMMUNICATION - HEALTHEAST (OUTPATIENT)
Dept: SURGERY | Facility: CLINIC | Age: 83
End: 2018-11-21

## 2018-11-21 ENCOUNTER — NURSING HOME VISIT (OUTPATIENT)
Dept: GERIATRICS | Facility: CLINIC | Age: 83
End: 2018-11-21
Payer: COMMERCIAL

## 2018-11-21 VITALS
TEMPERATURE: 97.6 F | HEART RATE: 56 BPM | WEIGHT: 192.4 LBS | SYSTOLIC BLOOD PRESSURE: 119 MMHG | BODY MASS INDEX: 34.08 KG/M2 | DIASTOLIC BLOOD PRESSURE: 76 MMHG | RESPIRATION RATE: 20 BRPM | OXYGEN SATURATION: 93 %

## 2018-11-21 DIAGNOSIS — R11.0 NAUSEA: ICD-10-CM

## 2018-11-21 DIAGNOSIS — I10 BENIGN ESSENTIAL HYPERTENSION: ICD-10-CM

## 2018-11-21 DIAGNOSIS — R19.09 MASS OF RIGHT INGUINAL REGION: ICD-10-CM

## 2018-11-21 DIAGNOSIS — K59.01 SLOW TRANSIT CONSTIPATION: ICD-10-CM

## 2018-11-21 DIAGNOSIS — M48.062 SPINAL STENOSIS OF LUMBAR REGION WITH NEUROGENIC CLAUDICATION: Primary | ICD-10-CM

## 2018-11-21 PROCEDURE — 99309 SBSQ NF CARE MODERATE MDM 30: CPT | Performed by: NURSE PRACTITIONER

## 2018-11-21 RX ORDER — HYDROCODONE BITARTRATE AND ACETAMINOPHEN 5; 325 MG/1; MG/1
1 TABLET ORAL 3 TIMES DAILY
COMMUNITY
End: 2019-01-14

## 2018-11-21 RX ORDER — HYDROCODONE BITARTRATE AND ACETAMINOPHEN 5; 325 MG/1; MG/1
1 TABLET ORAL EVERY 6 HOURS PRN
Status: ON HOLD | COMMUNITY
End: 2019-02-22

## 2018-11-21 NOTE — LETTER
"    11/21/2018        RE: Jazmin Clifton  [facility]  [address] [facility] [address]        [facility]    Chief Complaint   Patient presents with     retirement Acute       [facility] Record Number:  [id number]  Place of Service where encounter took place:  [facility]) [[id number]]    HPI:    Jazmin Clifton is a 85 year old  (12/30/1932), who is being seen today for an episodic care visit.  HPI information obtained from: facility chart records, facility staff, patient report and [facility] chart review.    Today's concern is:     Spinal stenosis of lumbar region with neurogenic claudication  Mass of right inguinal region  Nausea  Benign essential hypertension  Slow transit constipation   Patient reports she is not feeling well today, but is better this afternoon that she was this AM. States she felt clammy, nauseated and not well this AM. Felt lightheaded when standing with therapy. Has had intermittent nausea which has been ongoing since prior to her recent hospital stay. She reports she has had some shaking in her arms and legs, but this is not present currently. She wants to know why she was changed to oxycodone from her Norco, discussed that this was done per her request as she had been on oxycodone before and felt it provided her with better pain control. She does not remember this, but does state she \"probably said that.\" She is concerned the oxycodone is causing her nausea, lightheadedness, and clamminess and would like to go back on the Norco as she thinks it was adequate for her pain. She also reports she is constipated, and it is painful to pass stool as it is quite hard. She has been drinking miralax and prune juice. She reports frustration as now knowing when her back surgery is going to be as neurosurgery is waiting for clearance from spinal surgery.      ALLERGIES: Accupril; Ace inhibitors; Augmentin; Levofloxacin; " Macrobid [nitrofurantoin]; Morphine; Norvasc [amlodipine besylate]; and Quinapril  Past Medical, Surgical, Family and Social History reviewed and updated in EPIC.    Current Outpatient Prescriptions   Medication Sig Dispense Refill     Ascorbic Acid (VITAMIN C PO) Take 500 mg by mouth daily       aspirin 81 MG chewable tablet Take 81 mg by mouth       atorvastatin (LIPITOR) 40 MG tablet TAKE 1 TAB BY MOUTH AT BEDTIME FOR HIGH CHOLESTEROL 30 tablet 3     B COMPLEX-C-E-ZN PO Take 1 tablet by mouth daily       bisacodyl (DULCOLAX) 10 MG Suppository Place 10 mg rectally daily as needed for constipation       CALCIUM 600+D 600-200 MG-UNIT TABS TAKE 1 TAB BY MOUTH ONCE DAILY 30 tablet 11     calcium carbonate (TUMS) 500 MG chewable tablet Take 1 chew tab by mouth every hour as needed for heartburn       CYCLOBENZAPRINE HCL PO Take 5 mg by mouth 3 times daily as needed for muscle spasms       DONEPEZIL HCL PO Take 5 mg by mouth daily       DULoxetine HCl (CYMBALTA PO) Take 30 mg by mouth daily       ESTRIOL 1MG/GRAM CREAM Apply 1 g topically See Admin Instructions Insert 1 application vaginally at bedtime every Mon, Wed, Fri for vaginal dryness, burning, itching apply pea size amount vaginally at bedtime three times a week       fluticasone (FLONASE) 50 MCG/ACT spray Spray 1 spray into both nostrils 2 times daily       fluticasone-vilanterol (BREO ELLIPTA) 100-25 MCG/INH oral inhaler Inhale 1 puff into the lungs daily       Furosemide (LASIX PO) Take 40 mg by mouth daily        FUROSEMIDE PO Take 20 mg by mouth daily as needed for weight gain daily for >2 pound weight gain in one day       GABAPENTIN PO Take 100 mg by mouth every morning       GABAPENTIN PO Take 200 mg by mouth At Bedtime        guaiFENesin (ROBITUSSIN) 100 MG/5ML SYRP Take 10 mLs by mouth every 4 hours as needed for cough        HYDROcodone-acetaminophen (NORCO) 5-325 MG tablet Take 1 tablet by mouth 3 times daily       HYDROcodone-acetaminophen (NORCO)  5-325 MG tablet Take 1 tablet by mouth every 4 hours as needed for severe pain       hydrocortisone 1 % CREA cream Apply topically every 6 hours as needed for itching       isosorbide mononitrate (IMDUR) 30 MG 24 hr tablet TAKE 1 TAB BY MOUTH ONCE DAILY FOR HYPERTENSION/HIGH BLOOD PRESSURE 30 tablet 3     isradipine (DYNACIRC) 5 MG capsule Take 5 mg by mouth 2 times daily       levalbuterol (XOPENEX) 1.25 MG/3ML neb solution Take 1 ampule by nebulization every 4 hours as needed for shortness of breath / dyspnea or wheezing       LISINOPRIL PO Take 20 mg by mouth daily       loperamide (IMODIUM) 2 MG capsule Take 2 mg by mouth 4 times daily as needed for diarrhea       magnesium hydroxide (MILK OF MAGNESIA) 400 MG/5ML suspension Take 30 mLs by mouth daily as needed for constipation or heartburn       MAGNESIUM OXIDE PO Take 250 mg by mouth daily       memantine (NAMENDA) 5 MG tablet TAKE 1 TAB BY MOUTH ONCE DAILY FOR DEMENTIA 30 tablet 3     Menthol, Topical Analgesic, (BIOFREEZE) 4 % GEL Externally apply topically daily And Three times a day PRN       methylPREDNISolone (MEDROL DOSEPAK) 4 MG tablet Take 4 mg by mouth daily follow package directions       nystatin (MYCOSTATIN) 802659 UNIT/GM POWD Apply topically 2 times daily as needed        Ondansetron (ZOFRAN ODT PO) Take 4 mg by mouth every 8 hours as needed for nausea       pantoprazole (PROTONIX) 40 MG EC tablet TAKE 1 TAB BY MOUTH ONCE DAILY BEFORE BREAKFAST FOR GERD 30 tablet 3     polyethylene glycol (MIRALAX/GLYCOLAX) Packet Take 17 g by mouth daily        polyvinyl alcohol (LIQUIFILM TEARS) 1.4 % ophthalmic solution Place 1 drop into both eyes 2 times daily And 4 times a day PRN       senna-docusate (SENOKOT-S;PERICOLACE) 8.6-50 MG per tablet Take 1 tablet by mouth 2 times daily And once daily PRN       SIMETHICONE-80 PO Take 160 mg by mouth 4 times daily as needed for intestinal gas        SPIRONOLACTONE PO Take 25 mg by mouth daily        umeclidinium-vilanterol (ANORO ELLIPTA) 62.5-25 MCG/INH oral inhaler Inhale 1 puff into the lungs daily       Zinc 30 MG CAPS Take 30 mg by mouth daily       Medications reviewed:  Medications reconciled to facility chart and changes were made to reflect current medications as identified as above med list. Below are the changes that were made:   Medications stopped since last EPIC medication reconciliation:   There are no discontinued medications.    Medications started since last [facility] medication reconciliation:  No orders of the defined types were placed in this encounter.        REVIEW OF SYSTEMS:  4 point ROS including Respiratory, CV, GI and , other than that noted in the HPI,  is negative    Physical Exam:  /76  Pulse 56  Temp 97.6  F (36.4  C)  Resp 20  Wt 192 lb 6.4 oz (87.3 kg)  SpO2 93%  BMI 34.08 kg/m2  GENERAL APPEARANCE:  Alert, in no distress, oriented, cooperative  RESP:  respiratory effort and palpation of chest normal, lungs clear to auscultation , no respiratory distress  CV:  Palpation and auscultation of heart done , regular rate and rhythm, no murmur, rub, or gallop, +2 pedal pulses  ABDOMEN:  normal bowel sounds, soft, nontender, no hepatosplenomegaly or other masses, no guarding or rebound  M/S:   Gait and station abnormal generazalized weakness, mild tenderness to lumbar spine, right thigh un steady gait  SKIN:  Inspection of skin and subcutaneous tissue baseline  NEURO:   Cranial nerves 2-12 are normal tested and grossly at patient's baseline, Examination of sensation by touch is decrease BLE  PSYCH:  oriented X 3, affect and mood normal, slightly forgetful, judgement fair    Recent Labs:     CBC RESULTS:   Recent Labs   Lab Test  11/14/18   0802  11/12/18   0738  11/08/18   0737   WBC  7.6   --   6.3   RBC  3.70*   --   3.67*   HGB  9.8*  9.5*  9.7*   HCT  32.9*   --   32.5*   MCV  89   --   89   MCH  26.5   --   26.4*   MCHC  29.8*   --   29.8*   RDW  16.8*   --   17.0*   PLT   193   --   186       Last Basic Metabolic Panel:  Recent Labs   Lab Test  11/12/18   0738  11/08/18   0737   NA  139  146*   POTASSIUM  4.7  4.4   CHLORIDE  103  109   BLAIR  9.2  8.9   CO2  31  29   BUN  42*  37*   CR  1.33*  1.27*   GLC  80  80       Liver Function Studies -   Recent Labs   Lab Test  06/25/18   1020   ALBUMIN  3.5       TSH   Date Value Ref Range Status   08/14/2017 0.62 0.40 - 4.00 mU/L Final   04/18/2007 0.60 0.4 - 5.0 mU/L Final       Lab Results   Component Value Date    A1C 6.2 08/31/2017         Assessment/Plan:  (M48.062) Spinal stenosis of lumbar region with neurogenic claudication  (primary encounter diagnosis)  Comment: pain controlled on oxycodone, but this may be contributing to her lightheadedness, clamminess and not feeling well but suspect anxiety is contributing to this as well; awaiting clearance for neurosurgery  Plan: change oxycodone back to [place] as below, continue PT/OT, discontinue apap per patient's request, continue flexeril TID PRN, duloxetine 30mg daily, gabapentin, biofreeze; monitor and adjust; will check BMP and HGb to check for stability.     (R19.09) Mass of right inguinal region  Comment: stable, but she does feel it is less tender  Plan: apply compression tights as ordered by general surgery/ f/u with vascular surgery as scheduled on 12/4; continue lasix, spironolactone, daily weights, awaiting clearance for back surgery.     (R11.0) Nausea  Comment: ? If rt oxycodone, back pain, anxiety? Constipation could be contributing recently.   Plan: pain/bowel regimen as below; continue PRN zofran    (I10) Benign essential hypertension  Comment: labile recently, ? R/t pain medication, reduced oral intake  Plan: reduce lisinopril as below, hold parameters on isradipine, continue lasix, imdur and spironolactone as scheduled. Monitor and adjust    (K59.01) Slow transit constipation  Comment: likely r/t pain medication, decreased mobility and inadequate fluid intake  Plan:  increase senna to BID as below, increase miralax to daily, prune juice PRN, monitor and adjust PRN    Orders:  1. discontinue Oxycodone  2. Start Nocro 5/325 1 tab PO TID and 1 tab PO q 4 hours PRN Dx: pain  2. discontinue Tylenol  4. BMP, CBC on 11/23 Dx: HTN, Anemia  5. Decrease Lisinopril to 20 mg PO Q day Hold for SBP <110   6. Hold Isradipine for SBP <110, HR<60  7. Increase senna s to 1 tab PO BID Dx: Constipation  8. Change Miralax 17 gm PO Q day Dx: Constipation hold for lose stools      Electronically signed by  MARY Gómez CNP      Sincerely,        MARY Gómez CNP

## 2018-11-21 NOTE — PROGRESS NOTES
[facility]  INITIAL VISIT NOTE  November 20, 2018    PRIMARY CARE PROVIDER AND CLINIC:  Roby Willis PHYSICIAN [facility] [address] [facility] / [facility]    Chief Complaint   Patient presents with     Hospital F/U       HPI:    Jazmin Clifton is a 85 year old  (12/30/1932) female who was seen at [facility] on November 20, 2018 for an initial visit. Medical history is notable for CAD, HTN, COPD, CVA She was hospitalized at [facility] from 10/29/18 to 11/6/18 where she presented with back pain and was found to have [place] on CKD. MRI lumbar spine with marked bilateral L1-L2 and T12-L1 foraminal narrowing. Neurosurgery consulted and plan was for a L1-2 decompression as an outpatient. CT abdomen with incidental finding of bilateral renal cysts, one of which needs follow up imaging. She was admitted to this facility for medical management and rehab.     She developed a seroma in her R groin last week. U/S at facility with simple fluid collection and subsequent follow up with general surgery on 11/16/18 with recommendation for compression garment (note not yet in Epic). Spine surgery is on hold until clearance from general surgery.     Today, Ms. Clifton reports minimal pain in her leg. She is anxious to have the surgery. A friend is supposed to  some Spanx from [employer], but this hasn't happened yet. No chest pain or dyspnea. No abdominal pain. Notes no change in size of seroma. Working with therapies. No concerns per nursing.     CODE STATUS:   CPR/Full code     ALLERGIES:     Allergies   Allergen Reactions     Accupril      Ace Inhibitors Unknown     Accupril     Augmentin Unknown     Levofloxacin Unknown     Macrobid [Nitrofurantoin]      Morphine Other (See Comments)     hallucinations     Norvasc [Amlodipine Besylate]      Leg swelling       Quinapril        PAST MEDICAL HISTORY:   Past Medical History:   Diagnosis Date     ABDOMINAL PAIN GENERALIZED 3/15/2006    1 month of  "abdominal pain that bryn radiates into her back and chest.  Pt was hospitilzed 2x for the pain at [facility] --pt hopsitized for 3 days.  Rcords not ab=vailable.  She was told either \" twisted intestine or blockage of bowel.  Pt feels it is the hiatal hernia.  Pt hospitilized again a second time  A month ago.  Ct scans x 2 showed \" nothing.\"  Pt had a barium and xrays.  Pt sa     Abdominal pain, generalized 3/15/2006    1 month of abdominal pain that bryn radiates into her back and chest.  Pt was hospitilzed 2x for the pain at [facility] --pt hopsitized for 3 days.  Rcords not ab=vailable.  She was told either \" twisted intestine or blockage of bowel.  Pt feels it is the hiatal hernia.  Pt hospitilized again a second time  A month ago.  Ct scans x 2 showed \" nothing.\"  Pt had a barium and xrays.  Pt sa     Atherosclerosis of renal artery (H)     Left renal artery stenosis     BENIGN HYPERTENSION 5/1/2006 5/1/2006   Increase catapres to 0.3 mg and decrease clonidine to 0.1 mg daily.  Recheck bp in 1 month.      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     Dyspepsia     Generalized osteoarthrosis, unspecified site     DJD-chronic back pain     Herpes zoster dermatitis of eyelid      Insomnia, unspecified      Lumbago     Chronic back pain     Other chest pain     Atypical Chest Pain     Other specified cardiac dysrhythmias(427.89)     Bradycardia, improved on lower dose beta blockers      Rectocele     Grade 3     Unspecified disorder of kidney and ureter     Renal insufficiency     Unspecified essential hypertension        PAST SURGICAL HISTORY:   Past Surgical History:   Procedure Laterality Date "     CHOLECYSTECTOMY, LAPOROSCOPIC  1997    Cholecystectomy, Laparoscopic     ENDARTERECTOMY CAROTID Right 8/31/2017    Procedure: ENDARTERECTOMY CAROTID;  RIGHT CAROTID ENDARTERECTOMY WITH EEG;  Surgeon: Hilario Parry MD;  Location: [facility] OR     HYSTERECTOMY, RAYMOND  1982    oophorectomy,RAYMOND     SURGICAL HISTORY OF -       Laminectomy x 3     SURGICAL HISTORY OF -       MCA Aneurysm repair     SURGICAL HISTORY OF -   1996    Bladder suspension (Bladder Repair x2)     SURGICAL HISTORY OF -       Bilateral Hand Surgeries for Arthritis x2     SURGICAL HISTORY OF -       Repair of a Cerebral Aneurysm       FAMILY HISTORY:   Family History   Problem Relation Age of Onset     Cancer Mother      stomache     Cerebrovascular Disease Father      HEART DISEASE Father      Cancer Brother      lymphoma     Eye Disorder Son      Asthma Son      Diabetes Son      GASTROINTESTINAL DISEASE Son      no control over bladder or bowels     C.A.D. No family hx of      Hypertension No family hx of      Breast Cancer No family hx of      Cancer - colorectal No family hx of      Prostate Cancer No family hx of        SOCIAL HISTORY:   Lives alone     MEDICATIONS:  Current Outpatient Prescriptions   Medication Sig Dispense Refill     ACETAMINOPHEN PO Take 1,000 mg by mouth every 8 hours        Ascorbic Acid (VITAMIN C PO) Take 500 mg by mouth daily       aspirin 81 MG chewable tablet Take 81 mg by mouth       atorvastatin (LIPITOR) 40 MG tablet TAKE 1 TAB BY MOUTH AT BEDTIME FOR HIGH CHOLESTEROL 30 tablet 3     B COMPLEX-C-E-ZN PO Take 1 tablet by mouth daily       bisacodyl (DULCOLAX) 10 MG Suppository Place 10 mg rectally daily as needed for constipation       CALCIUM 600+D 600-200 MG-UNIT TABS TAKE 1 TAB BY MOUTH ONCE DAILY 30 tablet 11     calcium carbonate (TUMS) 500 MG chewable tablet Take 1 chew tab by mouth every hour as needed for heartburn       CYCLOBENZAPRINE HCL PO Take 5 mg by mouth 3 times daily as needed for muscle spasms        DONEPEZIL HCL PO Take 5 mg by mouth daily       DULoxetine HCl (CYMBALTA PO) Take 30 mg by mouth daily       ESTRIOL 1MG/GRAM CREAM Apply 1 g topically See Admin Instructions Insert 1 application vaginally at bedtime every Mon, Wed, Fri for vaginal dryness, burning, itching apply pea size amount vaginally at bedtime three times a week       fluticasone (FLONASE) 50 MCG/ACT spray Spray 1 spray into both nostrils 2 times daily       fluticasone-vilanterol (BREO ELLIPTA) 100-25 MCG/INH oral inhaler Inhale 1 puff into the lungs daily       Furosemide (LASIX PO) Take 40 mg by mouth daily        FUROSEMIDE PO Take 20 mg by mouth daily as needed for weight gain daily for >2 pound weight gain in one day       GABAPENTIN PO Take 100 mg by mouth every morning       GABAPENTIN PO Take 200 mg by mouth At Bedtime        guaiFENesin (ROBITUSSIN) 100 MG/5ML SYRP Take 10 mLs by mouth every 4 hours as needed for cough        hydrocortisone 1 % CREA cream Apply topically every 6 hours as needed for itching       isosorbide mononitrate (IMDUR) 30 MG 24 hr tablet TAKE 1 TAB BY MOUTH ONCE DAILY FOR HYPERTENSION/HIGH BLOOD PRESSURE 30 tablet 3     isradipine (DYNACIRC) 5 MG capsule Take 5 mg by mouth 2 times daily       levalbuterol (XOPENEX) 1.25 MG/3ML neb solution Take 1 ampule by nebulization every 4 hours as needed for shortness of breath / dyspnea or wheezing       lisinopril (PRINIVIL,ZESTRIL) 30 MG tablet TAKE 1 TAB BY MOUTH ONCE DAILY FOR HYPERTENSION/HIGH BLOOD PRESSURE 30 tablet 3     loperamide (IMODIUM) 2 MG capsule Take 2 mg by mouth 4 times daily as needed for diarrhea       magnesium hydroxide (MILK OF MAGNESIA) 400 MG/5ML suspension Take 30 mLs by mouth daily as needed for constipation or heartburn       MAGNESIUM OXIDE PO Take 250 mg by mouth daily       memantine (NAMENDA) 5 MG tablet TAKE 1 TAB BY MOUTH ONCE DAILY FOR DEMENTIA 30 tablet 3     Menthol, Topical Analgesic, (BIOFREEZE) 4 % GEL Externally apply  topically daily And Three times a day PRN       methylPREDNISolone (MEDROL DOSEPAK) 4 MG tablet Take 4 mg by mouth daily follow package directions       nystatin (MYCOSTATIN) 507002 UNIT/GM POWD Apply topically 2 times daily as needed        Ondansetron (ZOFRAN ODT PO) Take 4 mg by mouth every 8 hours as needed for nausea       OXYCODONE HCL PO Take 5-10 mg by mouth every 4 hours as needed       OXYCODONE HCL PO Take 5 mg by mouth 3 times daily       pantoprazole (PROTONIX) 40 MG EC tablet TAKE 1 TAB BY MOUTH ONCE DAILY BEFORE BREAKFAST FOR GERD 30 tablet 3     polyethylene glycol (MIRALAX/GLYCOLAX) Packet Take 1 packet by mouth daily       polyvinyl alcohol (LIQUIFILM TEARS) 1.4 % ophthalmic solution Place 1 drop into both eyes 2 times daily And 4 times a day PRN       senna-docusate (SENOKOT-S;PERICOLACE) 8.6-50 MG per tablet Take 1 tablet by mouth daily And once daily PRN       SIMETHICONE-80 PO Take 160 mg by mouth 4 times daily as needed for intestinal gas        SPIRONOLACTONE PO Take 25 mg by mouth daily       umeclidinium-vilanterol (ANORO ELLIPTA) 62.5-25 MCG/INH oral inhaler Inhale 1 puff into the lungs daily       Zinc 30 MG CAPS Take 30 mg by mouth daily         Post Discharge Medication Reconciliation Status: medication reconcilation previously completed during another office visit.    ROS:  10 point ROS neg other than the symptoms noted above in the HPI    PHYSICAL EXAM:  /56  Pulse 57  Temp 98.6  F (37  C)  Resp 20  Wt 192 lb 6.4 oz (87.3 kg)  SpO2 93%  BMI 34.08 kg/m2  Gen: sitting on edge of bed, alert, cooperative and in no acute distress  HEENT: normocephalic; oropharynx clear  Card: RRR, S1, S2, no murmurs  Resp: lungs clear to auscultation bilaterally  GI: abdomen soft, not-tender  MSK: normal muscle tone, some dependent LE edema  Neuro: CX II-XII grossly in tact; ROM in all four extremities grossly in tact  Psych: alert and oriented x3; normal affect    LABORATORY/IMAGING  DATA:  Reviewed as per Epic    ASSESSMENT/PLAN:    Right Groin Seroma  Was seen by general surgery on 11/16/18 - no note in Epic, but plan is for compression.   -- she is awaiting a friend to bring her some Spanx for compression  -- follow clinically    Lumbar Spinal Stenosis  Plan is for L1-2 decompression with Dr. Morris (neurosurgery). This was scheduled for 11/16, but is on hold due to seroma.   -- analgesia with gabapentin 100 mg qam and 200 mg at bedtime as well as Norco 5-325 mg TID and q4h PRN  -- PT/OT    CAD / CHF / HTN / HLD  SBPs 110s-120s Weights stable  Around 193 lb.   -- continues on ASA 81 mg daily, atorvastatin 40 mg qhs, furosemide 40 mg daily, Imdur 30 mg daily, isradipine 5 mg BID, lisinopril 30 mg daily and spironolactone 25 mg daily  -- follow BPs, weights, clinical volume status     Bilateral LE Edema  Chronic.   -- continues on furosemide 40 mg daily and spironolactone 25 mg daily   -- facility is ordering a new recliner today to help her with ease of use to get her legs up  -- continue compression to RLE     Dementia Without Behavioral Disturbance  Lives in AL facility. Today was a good historian. CPT 4.7/5.6 during [facility] stay.   -- OT following for cog assessment  -- continues on donepezil 5 mg daily and memantine 5 mg daily      COPD  No signs of exacerbation. Lungs clear today.   -- continues on fluticasone-vilanterol 100-25 mcg daily, umeclidinium-vilanterol 62.5-25 mcg daily and levalbuterol nebs    Depression / Anxiety  Mood and spirits good today  -- continues on duloxetine 30 mg daily  -- supportive cares     CVA  By history.   -- continues on ASA 81 mg daily and atorvastatin 40 mg qhs     Osteoporosis  -- continues on Ca+D     GERD  -- continues on pantoprazole 40 mg daily    Slow Transit Constipation  -- continues on Miralax 17g daily and Senna-S 1 tab BID and daily PRN  -- adjust bowel regimen as needed    Physical Deconditioning  In setting of hospitalization and underlying  medical conditions  -- ongoing PT/OT    Electronically signed by:  Ana Cristina Yu MD

## 2018-11-23 ENCOUNTER — HOSPITAL LABORATORY (OUTPATIENT)
Facility: OTHER | Age: 83
End: 2018-11-23

## 2018-11-23 LAB
ANION GAP SERPL CALCULATED.3IONS-SCNC: 6 MMOL/L (ref 3–14)
BUN SERPL-MCNC: 54 MG/DL (ref 7–30)
CALCIUM SERPL-MCNC: 9.4 MG/DL (ref 8.5–10.1)
CHLORIDE SERPL-SCNC: 108 MMOL/L (ref 94–109)
CO2 SERPL-SCNC: 25 MMOL/L (ref 20–32)
CREAT SERPL-MCNC: 1.65 MG/DL (ref 0.52–1.04)
ERYTHROCYTE [DISTWIDTH] IN BLOOD BY AUTOMATED COUNT: 17 % (ref 10–15)
GFR SERPL CREATININE-BSD FRML MDRD: 30 ML/MIN/1.7M2
GLUCOSE SERPL-MCNC: 114 MG/DL (ref 70–99)
HCT VFR BLD AUTO: 36.8 % (ref 35–47)
HGB BLD-MCNC: 11.1 G/DL (ref 11.7–15.7)
MCH RBC QN AUTO: 27.4 PG (ref 26.5–33)
MCHC RBC AUTO-ENTMCNC: 30.2 G/DL (ref 31.5–36.5)
MCV RBC AUTO: 91 FL (ref 78–100)
PLATELET # BLD AUTO: 202 10E9/L (ref 150–450)
POTASSIUM SERPL-SCNC: 5.8 MMOL/L (ref 3.4–5.3)
RBC # BLD AUTO: 4.05 10E12/L (ref 3.8–5.2)
SODIUM SERPL-SCNC: 139 MMOL/L (ref 133–144)
WBC # BLD AUTO: 9.1 10E9/L (ref 4–11)

## 2018-11-26 ENCOUNTER — HOSPITAL LABORATORY (OUTPATIENT)
Facility: OTHER | Age: 83
End: 2018-11-26

## 2018-11-26 ENCOUNTER — NURSING HOME VISIT (OUTPATIENT)
Dept: GERIATRICS | Facility: CLINIC | Age: 83
End: 2018-11-26
Payer: COMMERCIAL

## 2018-11-26 VITALS
DIASTOLIC BLOOD PRESSURE: 72 MMHG | HEART RATE: 55 BPM | SYSTOLIC BLOOD PRESSURE: 125 MMHG | TEMPERATURE: 97.5 F | BODY MASS INDEX: 34.15 KG/M2 | OXYGEN SATURATION: 93 % | WEIGHT: 192.8 LBS | RESPIRATION RATE: 18 BRPM

## 2018-11-26 DIAGNOSIS — R11.0 NAUSEA: ICD-10-CM

## 2018-11-26 DIAGNOSIS — M48.061 SPINAL STENOSIS OF LUMBAR REGION, UNSPECIFIED WHETHER NEUROGENIC CLAUDICATION PRESENT: ICD-10-CM

## 2018-11-26 DIAGNOSIS — E87.5 HYPERKALEMIA: Primary | ICD-10-CM

## 2018-11-26 DIAGNOSIS — N18.30 CKD (CHRONIC KIDNEY DISEASE) STAGE 3, GFR 30-59 ML/MIN (H): ICD-10-CM

## 2018-11-26 DIAGNOSIS — I10 BENIGN ESSENTIAL HYPERTENSION: ICD-10-CM

## 2018-11-26 DIAGNOSIS — R19.09 MASS OF RIGHT INGUINAL REGION: ICD-10-CM

## 2018-11-26 LAB
ANION GAP SERPL CALCULATED.3IONS-SCNC: 4 MMOL/L (ref 3–14)
BUN SERPL-MCNC: 54 MG/DL (ref 7–30)
CALCIUM SERPL-MCNC: 9.7 MG/DL (ref 8.5–10.1)
CHLORIDE SERPL-SCNC: 107 MMOL/L (ref 94–109)
CO2 SERPL-SCNC: 28 MMOL/L (ref 20–32)
CREAT SERPL-MCNC: 1.68 MG/DL (ref 0.52–1.04)
GFR SERPL CREATININE-BSD FRML MDRD: 29 ML/MIN/1.7M2
GLUCOSE SERPL-MCNC: 91 MG/DL (ref 70–99)
POTASSIUM SERPL-SCNC: 6.1 MMOL/L (ref 3.4–5.3)
POTASSIUM SERPL-SCNC: 6.2 MMOL/L (ref 3.4–5.3)
SODIUM SERPL-SCNC: 139 MMOL/L (ref 133–144)

## 2018-11-26 PROCEDURE — 99309 SBSQ NF CARE MODERATE MDM 30: CPT | Performed by: NURSE PRACTITIONER

## 2018-11-26 NOTE — PROGRESS NOTES
[facility]    Chief Complaint   Patient presents with     assisted Acute       [facility] Medical Record Number:  [id number]  Place of Service where encounter took place:  [facility] ([facility]) [[id number]]    HPI:    Jazmin Clifton is a 85 year old  (12/30/1932), who is being seen today for an episodic care visit.  HPI information obtained from: facility chart records, facility staff, patient report and [facility] Epic chart review.    Today's concern is:  Hyperkalemia  CKD (chronic kidney disease) stage 3, GFR 30-59 ml/min (H)  Benign essential hypertension  BMP drawn on 11/23, creatinine elevated from baseline of ~1.3 go 1.65, K+ 5.8, she was 15gm of kayexalate with good results  and lisinopril discontinued. BMP recheck today, K+ 6.2, Creatnine 1.68. She denies any chest pain, palpitations, missed heart bets, SOB, lightheadedness or dizziness. States her nausea is better after switching to Norco, but did have some nausea last night when she was having a flare-up of her back pain. She does think she is eating and drinking a little better.           ALLERGIES: Accupril; Ace inhibitors; Augmentin; Levofloxacin; Macrobid [nitrofurantoin]; Morphine; Norvasc [amlodipine besylate]; and Quinapril  Past Medical, Surgical, Family and Social History reviewed and updated in [facility].    Current Outpatient Prescriptions   Medication Sig Dispense Refill     Ascorbic Acid (VITAMIN C PO) Take 500 mg by mouth daily       aspirin 81 MG chewable tablet Take 81 mg by mouth       atorvastatin (LIPITOR) 40 MG tablet TAKE 1 TAB BY MOUTH AT BEDTIME FOR HIGH CHOLESTEROL 30 tablet 3     B COMPLEX-C-E-ZN PO Take 1 tablet by mouth daily       bisacodyl (DULCOLAX) 10 MG Suppository Place 10 mg rectally daily as needed for constipation       CALCIUM 600+D 600-200 MG-UNIT TABS TAKE 1 TAB BY MOUTH ONCE DAILY 30 tablet 11     calcium carbonate (TUMS) 500 MG chewable tablet Take 1 chew tab by mouth every hour as needed  for heartburn       CYCLOBENZAPRINE HCL PO Take 5 mg by mouth 3 times daily as needed for muscle spasms       DONEPEZIL HCL PO Take 5 mg by mouth daily       DULoxetine HCl (CYMBALTA PO) Take 30 mg by mouth daily       ESTRIOL 1MG/GRAM CREAM Apply 1 g topically See Admin Instructions Insert 1 application vaginally at bedtime every Mon, Wed, Fri for vaginal dryness, burning, itching apply pea size amount vaginally at bedtime three times a week       fluticasone (FLONASE) 50 MCG/ACT spray Spray 1 spray into both nostrils 2 times daily       fluticasone-vilanterol (BREO ELLIPTA) 100-25 MCG/INH oral inhaler Inhale 1 puff into the lungs daily       Furosemide (LASIX PO) Take 40 mg by mouth daily        FUROSEMIDE PO Take 20 mg by mouth daily as needed for weight gain daily for >2 pound weight gain in one day       GABAPENTIN PO Take 100 mg by mouth every morning       GABAPENTIN PO Take 200 mg by mouth At Bedtime        guaiFENesin (ROBITUSSIN) 100 MG/5ML SYRP Take 10 mLs by mouth every 4 hours as needed for cough        HYDROcodone-acetaminophen (NORCO) 5-325 MG tablet Take 1 tablet by mouth 3 times daily       HYDROcodone-acetaminophen (NORCO) 5-325 MG tablet Take 1 tablet by mouth every 4 hours as needed for severe pain       hydrocortisone 1 % CREA cream Apply topically every 6 hours as needed for itching       isosorbide mononitrate (IMDUR) 30 MG 24 hr tablet TAKE 1 TAB BY MOUTH ONCE DAILY FOR HYPERTENSION/HIGH BLOOD PRESSURE 30 tablet 3     isradipine (DYNACIRC) 5 MG capsule Take 5 mg by mouth 2 times daily       levalbuterol (XOPENEX) 1.25 MG/3ML neb solution Take 1 ampule by nebulization every 4 hours as needed for shortness of breath / dyspnea or wheezing       LISINOPRIL PO Take 20 mg by mouth daily       loperamide (IMODIUM) 2 MG capsule Take 2 mg by mouth 4 times daily as needed for diarrhea       magnesium hydroxide (MILK OF MAGNESIA) 400 MG/5ML suspension Take 30 mLs by mouth daily as needed for  constipation or heartburn       MAGNESIUM OXIDE PO Take 250 mg by mouth daily       memantine (NAMENDA) 5 MG tablet TAKE 1 TAB BY MOUTH ONCE DAILY FOR DEMENTIA 30 tablet 3     Menthol, Topical Analgesic, (BIOFREEZE) 4 % GEL Externally apply topically daily And Three times a day PRN       methylPREDNISolone (MEDROL DOSEPAK) 4 MG tablet Take 4 mg by mouth daily follow package directions       nystatin (MYCOSTATIN) 916969 UNIT/GM POWD Apply topically 2 times daily as needed        Ondansetron (ZOFRAN ODT PO) Take 4 mg by mouth every 8 hours as needed for nausea       pantoprazole (PROTONIX) 40 MG EC tablet TAKE 1 TAB BY MOUTH ONCE DAILY BEFORE BREAKFAST FOR GERD 30 tablet 3     polyethylene glycol (MIRALAX/GLYCOLAX) Packet Take 17 g by mouth daily        polyvinyl alcohol (LIQUIFILM TEARS) 1.4 % ophthalmic solution Place 1 drop into both eyes 2 times daily And 4 times a day PRN       senna-docusate (SENOKOT-S;PERICOLACE) 8.6-50 MG per tablet Take 1 tablet by mouth 2 times daily And once daily PRN       SIMETHICONE-80 PO Take 160 mg by mouth 4 times daily as needed for intestinal gas        SPIRONOLACTONE PO Take 25 mg by mouth daily       umeclidinium-vilanterol (ANORO ELLIPTA) 62.5-25 MCG/INH oral inhaler Inhale 1 puff into the lungs daily       Zinc 30 MG CAPS Take 30 mg by mouth daily       Medications reviewed:  Medications reconciled to facility chart and changes were made to reflect current medications as identified as above med list. Below are the changes that were made:   Medications stopped since last EPIC medication reconciliation:   There are no discontinued medications.    Medications started since last [facility] medication reconciliation:  No orders of the defined types were placed in this encounter.        REVIEW OF SYSTEMS:  4 point ROS including Respiratory, CV, GI and , other than that noted in the HPI,  is negative    Physical Exam:  /72  Pulse 55  Temp 97.5  F (36.4  C)  Resp 18  Wt 192 lb  12.8 oz (87.5 kg)  SpO2 93%  BMI 34.15 kg/m2  GENERAL APPEARANCE:  Alert, in no distress, oriented, cooperative  RESP:  respiratory effort and palpation of chest normal, lungs clear to auscultation , no respiratory distress  CV:  Palpation and auscultation of heart done , regular rate and rhythm, no murmur, rub, or gallop, +2 pedal pulses, peripheral edema 1-2+ in BLE  ABDOMEN:  normal bowel sounds, soft, nontender, no hepatosplenomegaly or other masses, no guarding or rebound  M/S:   Gait and station abnormal generalized weakness, mild right groin tenderness, mild lumbar spine tenderness  .  SKIN:  Inspection of skin and subcutaneous tissue baseline, palpable mass to right groin  NEURO:   Cranial nerves 2-12 are normal tested and grossly at patient's baseline  PSYCH:  oriented X 3, normal insight, judgement and memory, affect and mood normal    Recent Labs:     CBC RESULTS:   Recent Labs   Lab Test  11/23/18   1042  11/14/18   0802   WBC  9.1  7.6   RBC  4.05  3.70*   HGB  11.1*  9.8*   HCT  36.8  32.9*   MCV  91  89   MCH  27.4  26.5   MCHC  30.2*  29.8*   RDW  17.0*  16.8*   PLT  202  193       Last Basic Metabolic Panel:  Recent Labs   Lab Test  11/26/18   1120  11/26/18   0820  11/23/18   1042   NA   --   139  139   POTASSIUM  6.1*  6.2*  5.8*   CHLORIDE   --   107  108   BLAIR   --   9.7  9.4   CO2   --   28  25   BUN   --   54*  54*   CR   --   1.68*  1.65*   GLC   --   91  114*       Liver Function Studies -   Recent Labs   Lab Test  06/25/18   1020   ALBUMIN  3.5       TSH   Date Value Ref Range Status   08/14/2017 0.62 0.40 - 4.00 mU/L Final   04/18/2007 0.60 0.4 - 5.0 mU/L Final       Lab Results   Component Value Date    A1C 6.2 08/31/2017         Assessment/Plan:  (E87.5) Hyperkalemia  (primary encounter diagnosis)  Comment: unclear etiology - ? Medication, poor oral intake  Plan: Kayexalate 30gm stat. discontinue spironolactone, increase lasix as below, encourage fluid intake, BMP on 11/28    (N18.3)  CKD (chronic kidney disease) stage 3, GFR 30-59 ml/min (H)  Comment: elevated above baseline, ? R/t poor oral intake from nausea? ATN  Plan: discontinue spironolactone, increase lasix, encourage fluid intake, BMP on 11/28    (I10) Benign essential hypertension  Comment: stable off lisinopril  Plan: discontinue spironolactone, increase lasix as below, continue isradipine, imdur, monitor    (M48.061) Spinal stenosis of lumbar region, unspecified whether neurogenic claudication present  Comment: pain control stable on  Noroc, awaiting to hear from neurosurgery to set date  Plan: continue norco, biofreeze, cyclobenzaprine, gabapentin, methylprednisone, zinc supplement; monitor    (R19.09) Mass of right inguinal region  Comment: improving, refusing Spanks as they are too uncomfortable  Plan: lasix as above, monitor    (R11.0) Nausea  Comment: improved, still has intermittently, likely d/t pain,   Plan: continue PRN zofran, diet as tolerated, pain plan as above, monitor.     Orders:  1. Push Fluid  2. Redraw K+ today if able  3. discontinue Spironolactone  4. Increase Lasix to 40 mg QAM, 20 mg Q Afternoon  5. BMP on 11.28 Dx: CKD, Hypokalemia    Electronically signed by  MARY Gómez CNP

## 2018-11-26 NOTE — LETTER
11/26/2018        RE: Jazmin Clifton  [facility]  [address] [address]        [facility]    Chief Complaint   Patient presents with     snf Acute       [facility] Medical Record Number:  [id number]  Place of Service where encounter took place:  [facility] ([facility]) [[id number]]    HPI:    Jazmin Clifton is a 85 year old  (12/30/1932), who is being seen today for an episodic care visit.  HPI information obtained from: facility chart records, facility staff, patient report and [facility] chart review.    Today's concern is:  Hyperkalemia  CKD (chronic kidney disease) stage 3, GFR 30-59 ml/min (H)  Benign essential hypertension  BMP drawn on 11/23, creatinine elevated from baseline of ~1.3 go 1.65, K+ 5.8, she was 15gm of kayexalate with good results  and lisinopril discontinued. BMP recheck today, K+ 6.2, Creatnine 1.68. She denies any chest pain, palpitations, missed heart bets, SOB, lightheadedness or dizziness. States her nausea is better after switching to Norco, but did have some nausea last night when she was having a flare-up of her back pain. She does think she is eating and drinking a little better.           ALLERGIES: Accupril; Ace inhibitors; Augmentin; Levofloxacin; Macrobid [nitrofurantoin]; Morphine; Norvasc [amlodipine besylate]; and Quinapril  Past Medical, Surgical, Family and Social History reviewed and updated in [facility].    Current Outpatient Prescriptions   Medication Sig Dispense Refill     Ascorbic Acid (VITAMIN C PO) Take 500 mg by mouth daily       aspirin 81 MG chewable tablet Take 81 mg by mouth       atorvastatin (LIPITOR) 40 MG tablet TAKE 1 TAB BY MOUTH AT BEDTIME FOR HIGH CHOLESTEROL 30 tablet 3     B COMPLEX-C-E-ZN PO Take 1 tablet by mouth daily       bisacodyl (DULCOLAX) 10 MG Suppository Place 10 mg rectally daily as needed for constipation       CALCIUM 600+D 600-200 MG-UNIT TABS TAKE 1 TAB BY  MOUTH ONCE DAILY 30 tablet 11     calcium carbonate (TUMS) 500 MG chewable tablet Take 1 chew tab by mouth every hour as needed for heartburn       CYCLOBENZAPRINE HCL PO Take 5 mg by mouth 3 times daily as needed for muscle spasms       DONEPEZIL HCL PO Take 5 mg by mouth daily       DULoxetine HCl (CYMBALTA PO) Take 30 mg by mouth daily       ESTRIOL 1MG/GRAM CREAM Apply 1 g topically See Admin Instructions Insert 1 application vaginally at bedtime every Mon, Wed, Fri for vaginal dryness, burning, itching apply pea size amount vaginally at bedtime three times a week       fluticasone (FLONASE) 50 MCG/ACT spray Spray 1 spray into both nostrils 2 times daily       fluticasone-vilanterol (BREO ELLIPTA) 100-25 MCG/INH oral inhaler Inhale 1 puff into the lungs daily       Furosemide (LASIX PO) Take 40 mg by mouth daily        FUROSEMIDE PO Take 20 mg by mouth daily as needed for weight gain daily for >2 pound weight gain in one day       GABAPENTIN PO Take 100 mg by mouth every morning       GABAPENTIN PO Take 200 mg by mouth At Bedtime        guaiFENesin (ROBITUSSIN) 100 MG/5ML SYRP Take 10 mLs by mouth every 4 hours as needed for cough        HYDROcodone-acetaminophen (NORCO) 5-325 MG tablet Take 1 tablet by mouth 3 times daily       HYDROcodone-acetaminophen (NORCO) 5-325 MG tablet Take 1 tablet by mouth every 4 hours as needed for severe pain       hydrocortisone 1 % CREA cream Apply topically every 6 hours as needed for itching       isosorbide mononitrate (IMDUR) 30 MG 24 hr tablet TAKE 1 TAB BY MOUTH ONCE DAILY FOR HYPERTENSION/HIGH BLOOD PRESSURE 30 tablet 3     isradipine (DYNACIRC) 5 MG capsule Take 5 mg by mouth 2 times daily       levalbuterol (XOPENEX) 1.25 MG/3ML neb solution Take 1 ampule by nebulization every 4 hours as needed for shortness of breath / dyspnea or wheezing       LISINOPRIL PO Take 20 mg by mouth daily       loperamide (IMODIUM) 2 MG capsule Take 2 mg by mouth 4 times daily as needed for  diarrhea       magnesium hydroxide (MILK OF MAGNESIA) 400 MG/5ML suspension Take 30 mLs by mouth daily as needed for constipation or heartburn       MAGNESIUM OXIDE PO Take 250 mg by mouth daily       memantine (NAMENDA) 5 MG tablet TAKE 1 TAB BY MOUTH ONCE DAILY FOR DEMENTIA 30 tablet 3     Menthol, Topical Analgesic, (BIOFREEZE) 4 % GEL Externally apply topically daily And Three times a day PRN       methylPREDNISolone (MEDROL DOSEPAK) 4 MG tablet Take 4 mg by mouth daily follow package directions       nystatin (MYCOSTATIN) 175576 UNIT/GM POWD Apply topically 2 times daily as needed        Ondansetron (ZOFRAN ODT PO) Take 4 mg by mouth every 8 hours as needed for nausea       pantoprazole (PROTONIX) 40 MG EC tablet TAKE 1 TAB BY MOUTH ONCE DAILY BEFORE BREAKFAST FOR GERD 30 tablet 3     polyethylene glycol (MIRALAX/GLYCOLAX) Packet Take 17 g by mouth daily        polyvinyl alcohol (LIQUIFILM TEARS) 1.4 % ophthalmic solution Place 1 drop into both eyes 2 times daily And 4 times a day PRN       senna-docusate (SENOKOT-S;PERICOLACE) 8.6-50 MG per tablet Take 1 tablet by mouth 2 times daily And once daily PRN       SIMETHICONE-80 PO Take 160 mg by mouth 4 times daily as needed for intestinal gas        SPIRONOLACTONE PO Take 25 mg by mouth daily       umeclidinium-vilanterol (ANORO ELLIPTA) 62.5-25 MCG/INH oral inhaler Inhale 1 puff into the lungs daily       Zinc 30 MG CAPS Take 30 mg by mouth daily       Medications reviewed:  Medications reconciled to facility chart and changes were made to reflect current medications as identified as above med list. Below are the changes that were made:   Medications stopped since last EPIC medication reconciliation:   There are no discontinued medications.    Medications started since last [facility] medication reconciliation:  No orders of the defined types were placed in this encounter.        REVIEW OF SYSTEMS:  4 point ROS including Respiratory, CV, GI and , other than that  noted in the HPI,  is negative    Physical Exam:  /72  Pulse 55  Temp 97.5  F (36.4  C)  Resp 18  Wt 192 lb 12.8 oz (87.5 kg)  SpO2 93%  BMI 34.15 kg/m2  GENERAL APPEARANCE:  Alert, in no distress, oriented, cooperative  RESP:  respiratory effort and palpation of chest normal, lungs clear to auscultation , no respiratory distress  CV:  Palpation and auscultation of heart done , regular rate and rhythm, no murmur, rub, or gallop, +2 pedal pulses, peripheral edema 1-2+ in BLE  ABDOMEN:  normal bowel sounds, soft, nontender, no hepatosplenomegaly or other masses, no guarding or rebound  M/S:   Gait and station abnormal generalized weakness, mild right groin tenderness, mild lumbar spine tenderness  .  SKIN:  Inspection of skin and subcutaneous tissue baseline, palpable mass to right groin  NEURO:   Cranial nerves 2-12 are normal tested and grossly at patient's baseline  PSYCH:  oriented X 3, normal insight, judgement and memory, affect and mood normal    Recent Labs:     CBC RESULTS:   Recent Labs   Lab Test  11/23/18   1042  11/14/18   0802   WBC  9.1  7.6   RBC  4.05  3.70*   HGB  11.1*  9.8*   HCT  36.8  32.9*   MCV  91  89   MCH  27.4  26.5   MCHC  30.2*  29.8*   RDW  17.0*  16.8*   PLT  202  193       Last Basic Metabolic Panel:  Recent Labs   Lab Test  11/26/18   1120  11/26/18   0820  11/23/18   1042   NA   --   139  139   POTASSIUM  6.1*  6.2*  5.8*   CHLORIDE   --   107  108   BLAIR   --   9.7  9.4   CO2   --   28  25   BUN   --   54*  54*   CR   --   1.68*  1.65*   GLC   --   91  114*       Liver Function Studies -   Recent Labs   Lab Test  06/25/18   1020   ALBUMIN  3.5       TSH   Date Value Ref Range Status   08/14/2017 0.62 0.40 - 4.00 mU/L Final   04/18/2007 0.60 0.4 - 5.0 mU/L Final       Lab Results   Component Value Date    A1C 6.2 08/31/2017         Assessment/Plan:  (E87.5) Hyperkalemia  (primary encounter diagnosis)  Comment: unclear etiology - ? Medication, poor oral intake  Plan:  Kayexalate 30gm stat. discontinue spironolactone, increase lasix as below, encourage fluid intake, BMP on 11/28    (N18.3) CKD (chronic kidney disease) stage 3, GFR 30-59 ml/min (H)  Comment: elevated above baseline, ? R/t poor oral intake from nausea? ATN  Plan: discontinue spironolactone, increase lasix, encourage fluid intake, BMP on 11/28    (I10) Benign essential hypertension  Comment: stable off lisinopril  Plan: discontinue spironolactone, increase lasix as below, continue isradipine, imdur, monitor    (M48.061) Spinal stenosis of lumbar region, unspecified whether neurogenic claudication present  Comment: pain control stable on  Noroc, awaiting to hear from neurosurgery to set date  Plan: continue norco, biofreeze, cyclobenzaprine, gabapentin, methylprednisone, zinc supplement; monitor    (R19.09) Mass of right inguinal region  Comment: improving, refusing Spanks as they are too uncomfortable  Plan: lasix as above, monitor    (R11.0) Nausea  Comment: improved, still has intermittently, likely d/t pain,   Plan: continue PRN zofran, diet as tolerated, pain plan as above, monitor.     Orders:  1. Push Fluid  2. Redraw K+ today if able  3. discontinue Spironolactone  4. Increase Lasix to 40 mg QAM, 20 mg Q Afternoon  5. BMP on 11.28 Dx: CKD, Hypokalemia    Electronically signed by  MARY Gómez CNP      Sincerely,        MARY Gómez CNP

## 2018-11-28 ENCOUNTER — HOSPITAL LABORATORY (OUTPATIENT)
Facility: OTHER | Age: 83
End: 2018-11-28

## 2018-11-28 ENCOUNTER — NURSING HOME VISIT (OUTPATIENT)
Dept: GERIATRICS | Facility: CLINIC | Age: 83
End: 2018-11-28
Payer: COMMERCIAL

## 2018-11-28 VITALS
DIASTOLIC BLOOD PRESSURE: 76 MMHG | SYSTOLIC BLOOD PRESSURE: 129 MMHG | HEART RATE: 61 BPM | TEMPERATURE: 98.4 F | OXYGEN SATURATION: 93 % | BODY MASS INDEX: 34.29 KG/M2 | WEIGHT: 193.6 LBS | RESPIRATION RATE: 18 BRPM

## 2018-11-28 DIAGNOSIS — E87.5 HYPERKALEMIA: Primary | ICD-10-CM

## 2018-11-28 DIAGNOSIS — M48.061 SPINAL STENOSIS OF LUMBAR REGION, UNSPECIFIED WHETHER NEUROGENIC CLAUDICATION PRESENT: ICD-10-CM

## 2018-11-28 DIAGNOSIS — R11.0 NAUSEA: ICD-10-CM

## 2018-11-28 DIAGNOSIS — R19.09 MASS OF RIGHT INGUINAL REGION: ICD-10-CM

## 2018-11-28 DIAGNOSIS — N18.30 CKD (CHRONIC KIDNEY DISEASE) STAGE 3, GFR 30-59 ML/MIN (H): ICD-10-CM

## 2018-11-28 LAB
ANION GAP SERPL CALCULATED.3IONS-SCNC: 7 MMOL/L (ref 3–14)
BUN SERPL-MCNC: 51 MG/DL (ref 7–30)
CALCIUM SERPL-MCNC: 9.1 MG/DL (ref 8.5–10.1)
CHLORIDE SERPL-SCNC: 103 MMOL/L (ref 94–109)
CO2 SERPL-SCNC: 31 MMOL/L (ref 20–32)
CREAT SERPL-MCNC: 1.68 MG/DL (ref 0.52–1.04)
GFR SERPL CREATININE-BSD FRML MDRD: 29 ML/MIN/1.7M2
GLUCOSE SERPL-MCNC: 94 MG/DL (ref 70–99)
POTASSIUM SERPL-SCNC: 5.1 MMOL/L (ref 3.4–5.3)
SODIUM SERPL-SCNC: 141 MMOL/L (ref 133–144)

## 2018-11-28 PROCEDURE — 99309 SBSQ NF CARE MODERATE MDM 30: CPT | Performed by: NURSE PRACTITIONER

## 2018-11-28 NOTE — PROGRESS NOTES
[facility]    Chief Complaint   Patient presents with     FPC Acute       [place] Medical Record Number:  [id number]  Place of Service where encounter took place:  [facility] ([facility]) [[id number]]    HPI:    Jazmin Clifton is a 85 year old  (12/30/1932), who is being seen today for an episodic care visit.  HPI information obtained from: facility chart records, facility staff, patient report and [facility] chart review.    Today's concern is:     Hyperkalemia  CKD (chronic kidney disease) stage 3, GFR 30-59 ml/min (H)  Mass of right inguinal region  Spinal stenosis of lumbar region, unspecified whether neurogenic claudication present  Nausea   Had K+ of 6.2 on 11/26, she was given 30gm of kayexalate, spironolactone discontinue (lisinopril discontinued previously) and furosemide increased to 40mg qAM and 20mg at bedtime. Reports she had multiple BM's. K+ checked today and was stable at 5.1, creatinine unchanged at 1.68. She reports no further nausea, no chest pain or palpitations. No lightheadedness or dizziness. She is trying to drink more. Weight has been stable 190-194lbs over the past 12 days. She continues to have some low back pain, is awaiting spinal surgery to be scheduled. Is happy that pain in right thigh improved and was told that her seroma is better.     ALLERGIES: Accupril; Ace inhibitors; Augmentin; Levofloxacin; Macrobid [nitrofurantoin]; Morphine; Norvasc [amlodipine besylate]; and Quinapril  Past Medical, Surgical, Family and Social History reviewed and updated in [facility].    Current Outpatient Prescriptions   Medication Sig Dispense Refill     Ascorbic Acid (VITAMIN C PO) Take 500 mg by mouth daily       aspirin 81 MG chewable tablet Take 81 mg by mouth       atorvastatin (LIPITOR) 40 MG tablet TAKE 1 TAB BY MOUTH AT BEDTIME FOR HIGH CHOLESTEROL 30 tablet 3     B COMPLEX-C-E-ZN PO Take 1 tablet by mouth daily       bisacodyl (DULCOLAX) 10 MG Suppository  Place 10 mg rectally daily as needed for constipation       CALCIUM 600+D 600-200 MG-UNIT TABS TAKE 1 TAB BY MOUTH ONCE DAILY 30 tablet 11     calcium carbonate (TUMS) 500 MG chewable tablet Take 1 chew tab by mouth every hour as needed for heartburn       CYCLOBENZAPRINE HCL PO Take 5 mg by mouth 3 times daily as needed for muscle spasms       DONEPEZIL HCL PO Take 5 mg by mouth daily       DULoxetine HCl (CYMBALTA PO) Take 30 mg by mouth daily       ESTRIOL 1MG/GRAM CREAM Apply 1 g topically See Admin Instructions Insert 1 application vaginally at bedtime every Mon, Wed, Fri for vaginal dryness, burning, itching apply pea size amount vaginally at bedtime three times a week       fluticasone (FLONASE) 50 MCG/ACT spray Spray 1 spray into both nostrils 2 times daily       fluticasone-vilanterol (BREO ELLIPTA) 100-25 MCG/INH oral inhaler Inhale 1 puff into the lungs daily       Furosemide (LASIX PO) Take 40 mg by mouth daily And 20mg q afternoon.       FUROSEMIDE PO Take 20 mg by mouth daily as needed for weight gain daily for >2 pound weight gain in one day       GABAPENTIN PO Take 100 mg by mouth every morning       GABAPENTIN PO Take 200 mg by mouth At Bedtime        guaiFENesin (ROBITUSSIN) 100 MG/5ML SYRP Take 10 mLs by mouth every 4 hours as needed for cough        HYDROcodone-acetaminophen (NORCO) 5-325 MG tablet Take 1 tablet by mouth 3 times daily       HYDROcodone-acetaminophen (NORCO) 5-325 MG tablet Take 1 tablet by mouth every 4 hours as needed for severe pain       hydrocortisone 1 % CREA cream Apply topically every 6 hours as needed for itching       isosorbide mononitrate (IMDUR) 30 MG 24 hr tablet TAKE 1 TAB BY MOUTH ONCE DAILY FOR HYPERTENSION/HIGH BLOOD PRESSURE 30 tablet 3     isradipine (DYNACIRC) 5 MG capsule Take 5 mg by mouth 2 times daily       levalbuterol (XOPENEX) 1.25 MG/3ML neb solution Take 1 ampule by nebulization every 4 hours as needed for shortness of breath / dyspnea or wheezing        loperamide (IMODIUM) 2 MG capsule Take 2 mg by mouth 4 times daily as needed for diarrhea       magnesium hydroxide (MILK OF MAGNESIA) 400 MG/5ML suspension Take 30 mLs by mouth daily as needed for constipation or heartburn       MAGNESIUM OXIDE PO Take 250 mg by mouth daily       memantine (NAMENDA) 5 MG tablet TAKE 1 TAB BY MOUTH ONCE DAILY FOR DEMENTIA 30 tablet 3     Menthol, Topical Analgesic, (BIOFREEZE) 4 % GEL Externally apply topically daily And Three times a day PRN       methylPREDNISolone (MEDROL DOSEPAK) 4 MG tablet Take 4 mg by mouth daily follow package directions       nystatin (MYCOSTATIN) 966408 UNIT/GM POWD Apply topically 2 times daily as needed        Ondansetron (ZOFRAN ODT PO) Take 4 mg by mouth every 8 hours as needed for nausea       pantoprazole (PROTONIX) 40 MG EC tablet TAKE 1 TAB BY MOUTH ONCE DAILY BEFORE BREAKFAST FOR GERD 30 tablet 3     polyethylene glycol (MIRALAX/GLYCOLAX) Packet Take 17 g by mouth daily        polyvinyl alcohol (LIQUIFILM TEARS) 1.4 % ophthalmic solution Place 1 drop into both eyes 2 times daily And 4 times a day PRN       senna-docusate (SENOKOT-S;PERICOLACE) 8.6-50 MG per tablet Take 1 tablet by mouth 2 times daily And once daily PRN       SIMETHICONE-80 PO Take 160 mg by mouth 4 times daily as needed for intestinal gas        umeclidinium-vilanterol (ANORO ELLIPTA) 62.5-25 MCG/INH oral inhaler Inhale 1 puff into the lungs daily       Zinc 30 MG CAPS Take 30 mg by mouth daily       Medications reviewed:  Medications reconciled to facility chart and changes were made to reflect current medications as identified as above med list. Below are the changes that were made:   Medications stopped since last EPIC medication reconciliation:   There are no discontinued medications.    Medications started since last [facility] medication reconciliation:  No orders of the defined types were placed in this encounter.        REVIEW OF SYSTEMS:  4 point ROS including Respiratory,  CV, GI and , other than that noted in the HPI,  is negative    Physical Exam:  /76  Pulse 61  Temp 98.4  F (36.9  C)  Resp 18  Wt 193 lb 9.6 oz (87.8 kg)  SpO2 93%  BMI 34.29 kg/m2  GENERAL APPEARANCE:  Alert, in no distress, oriented, cooperative  RESP:  respiratory effort and palpation of chest normal, lungs clear to auscultation , no respiratory distress  CV:  Palpation and auscultation of heart done , regular rate and rhythm, no murmur, rub, or gallop, +2 pedal pulses, peripheral edema 1-2+ in BLE  ABDOMEN:  normal bowel sounds, soft, nontender, no hepatosplenomegaly or other masses, no guarding or rebound  M/S:   generalized weakness, slow gait with walker, derased ROM to lumbar spine, PETERSEN purposefuly  SKIN:  seroma palpable, in right inguinal area under panus, smalled in size, nontender  NEURO:   Cranial nerves 2-12 are normal tested and grossly at patient's baseline, Examination of sensation by touch is decreased bilat feet  PSYCH:  oriented X 3, normal insight, judgement and memory, affect and mood normal    Recent Labs:     CBC RESULTS:   Recent Labs   Lab Test  11/23/18   1042  11/14/18   0802   WBC  9.1  7.6   RBC  4.05  3.70*   HGB  11.1*  9.8*   HCT  36.8  32.9*   MCV  91  89   MCH  27.4  26.5   MCHC  30.2*  29.8*   RDW  17.0*  16.8*   PLT  202  193       Last Basic Metabolic Panel:  Recent Labs   Lab Test  11/28/18   0740  11/26/18   1120  11/26/18   0820   NA  141   --   139   POTASSIUM  5.1  6.1*  6.2*   CHLORIDE  103   --   107   BLAIR  9.1   --   9.7   CO2  31   --   28   BUN  51*   --   54*   CR  1.68*   --   1.68*   GLC  94   --   91       Liver Function Studies -   Recent Labs   Lab Test  06/25/18   1020   ALBUMIN  3.5       TSH   Date Value Ref Range Status   08/14/2017 0.62 0.40 - 4.00 mU/L Final   04/18/2007 0.60 0.4 - 5.0 mU/L Final       Lab Results   Component Value Date    A1C 6.2 08/31/2017         Assessment/Plan:  (E87.5) Hyperkalemia  (primary encounter  diagnosis)  Comment: now stable  Plan: continue to hold spironolactone, lisinopril, continue increased lasix dose     (N18.3) CKD (chronic kidney disease) stage 3, GFR 30-59 ml/min (H)  Comment: stable, mildly above baseline, weight stable  Plan: continue lasix as above, daily weights, BMP to monitor for stability on 12/3    (R19.09) Mass of right inguinal region  Comment: appears smaller in size, does not tolerate spanks for compression  Plan: lasix as above, compression as tolerated, likely to be an ongoing/re-occurring issues given previous groin surgery with lymph node involvement. F/u with vascular surgery as scheduled on 12/4    (M48.061) Spinal stenosis of lumbar region, unspecified whether neurogenic claudication present  Comment: pain controlled, mobility remains limited, awaiting surgery to be scheduled - nursing staff spoke with neurosurgery Dr Morris's office today, she wants patient to go to vascular apt next week and will call facility hopefully later to day to scheduled back surgery  Plan: continue Norco 1 tad TID and 1 tab PO q4h PRN, gabapentin 100mg q AM 200mg at bedtime, continue zinc, vitamin C,  PT/OT, neurosurgery to schedule surgery.     (R11.0) Nausea  Comment: improved, unclear etiology - pain likely contributing, previous use of oxycodone likely contributing.   Plan: continue Zofran 4mg q 8hr PRN, diet as tolerated, pain regimen as above,     Orders:  1. BMP on 12/3 Dx: CKD, hyperkalemia      Electronically signed by  MARY Gómez CNP

## 2018-11-28 NOTE — LETTER
11/28/2018        RE: J[facility]min [facility]  [address] [place] [address]  [facility] [address]        [facility]    Chief Complaint   Patient presents with     longterm Acute       [place] Medical Record Number:  [id number]  Place of Service where encounter took place:  [facility] ([facility]) [[id number]]    HPI:    Jazmin Clifton is a 85 year old  (12/30/1932), who is being seen today for an episodic care visit.  HPI information obtained from: facility chart records, facility staff, patient report and [facility] chart review.    Today's concern is:     Hyperkalemia  CKD (chronic kidney disease) stage 3, GFR 30-59 ml/min (H)  Mass of right inguinal region  Spinal stenosis of lumbar region, unspecified whether neurogenic claudication present  Nausea   Had K+ of 6.2 on 11/26, she was given 30gm of kayexalate, spironolactone discontinue (lisinopril discontinued previously) and furosemide increased to 40mg qAM and 20mg at bedtime. Reports she had multiple BM's. K+ checked today and was stable at 5.1, creatinine unchanged at 1.68. She reports no further nausea, no chest pain or palpitations. No lightheadedness or dizziness. She is trying to drink more. Weight has been stable 190-194lbs over the past 12 days. She continues to have some low back pain, is awaiting spinal surgery to be scheduled. Is happy that pain in right thigh improved and was told that her seroma is better.     ALLERGIES: Accupril; Ace inhibitors; Augmentin; Levofloxacin; Macrobid [nitrofurantoin]; Morphine; Norvasc [amlodipine besylate]; and Quinapril  Past Medical, Surgical, Family and Social History reviewed and updated in [facility].    Current Outpatient Prescriptions   Medication Sig Dispense Refill     Ascorbic Acid (VITAMIN C PO) Take 500 mg by mouth daily       aspirin 81 MG chewable tablet Take 81 mg by mouth       atorvastatin (LIPITOR) 40 MG tablet TAKE 1 TAB BY  MOUTH AT BEDTIME FOR HIGH CHOLESTEROL 30 tablet 3     B COMPLEX-C-E-ZN PO Take 1 tablet by mouth daily       bisacodyl (DULCOLAX) 10 MG Suppository Place 10 mg rectally daily as needed for constipation       CALCIUM 600+D 600-200 MG-UNIT TABS TAKE 1 TAB BY MOUTH ONCE DAILY 30 tablet 11     calcium carbonate (TUMS) 500 MG chewable tablet Take 1 chew tab by mouth every hour as needed for heartburn       CYCLOBENZAPRINE HCL PO Take 5 mg by mouth 3 times daily as needed for muscle spasms       DONEPEZIL HCL PO Take 5 mg by mouth daily       DULoxetine HCl (CYMBALTA PO) Take 30 mg by mouth daily       ESTRIOL 1MG/GRAM CREAM Apply 1 g topically See Admin Instructions Insert 1 application vaginally at bedtime every Mon, Wed, Fri for vaginal dryness, burning, itching apply pea size amount vaginally at bedtime three times a week       fluticasone (FLONASE) 50 MCG/ACT spray Spray 1 spray into both nostrils 2 times daily       fluticasone-vilanterol (BREO ELLIPTA) 100-25 MCG/INH oral inhaler Inhale 1 puff into the lungs daily       Furosemide (LASIX PO) Take 40 mg by mouth daily And 20mg q afternoon.       FUROSEMIDE PO Take 20 mg by mouth daily as needed for weight gain daily for >2 pound weight gain in one day       GABAPENTIN PO Take 100 mg by mouth every morning       GABAPENTIN PO Take 200 mg by mouth At Bedtime        guaiFENesin (ROBITUSSIN) 100 MG/5ML SYRP Take 10 mLs by mouth every 4 hours as needed for cough        HYDROcodone-acetaminophen (NORCO) 5-325 MG tablet Take 1 tablet by mouth 3 times daily       HYDROcodone-acetaminophen (NORCO) 5-325 MG tablet Take 1 tablet by mouth every 4 hours as needed for severe pain       hydrocortisone 1 % CREA cream Apply topically every 6 hours as needed for itching       isosorbide mononitrate (IMDUR) 30 MG 24 hr tablet TAKE 1 TAB BY MOUTH ONCE DAILY FOR HYPERTENSION/HIGH BLOOD PRESSURE 30 tablet 3     isradipine (DYNACIRC) 5 MG capsule Take 5 mg by mouth 2 times daily        levalbuterol (XOPENEX) 1.25 MG/3ML neb solution Take 1 ampule by nebulization every 4 hours as needed for shortness of breath / dyspnea or wheezing       loperamide (IMODIUM) 2 MG capsule Take 2 mg by mouth 4 times daily as needed for diarrhea       magnesium hydroxide (MILK OF MAGNESIA) 400 MG/5ML suspension Take 30 mLs by mouth daily as needed for constipation or heartburn       MAGNESIUM OXIDE PO Take 250 mg by mouth daily       memantine (NAMENDA) 5 MG tablet TAKE 1 TAB BY MOUTH ONCE DAILY FOR DEMENTIA 30 tablet 3     Menthol, Topical Analgesic, (BIOFREEZE) 4 % GEL Externally apply topically daily And Three times a day PRN       methylPREDNISolone (MEDROL DOSEPAK) 4 MG tablet Take 4 mg by mouth daily follow package directions       nystatin (MYCOSTATIN) 787707 UNIT/GM POWD Apply topically 2 times daily as needed        Ondansetron (ZOFRAN ODT PO) Take 4 mg by mouth every 8 hours as needed for nausea       pantoprazole (PROTONIX) 40 MG EC tablet TAKE 1 TAB BY MOUTH ONCE DAILY BEFORE BREAKFAST FOR GERD 30 tablet 3     polyethylene glycol (MIRALAX/GLYCOLAX) Packet Take 17 g by mouth daily        polyvinyl alcohol (LIQUIFILM TEARS) 1.4 % ophthalmic solution Place 1 drop into both eyes 2 times daily And 4 times a day PRN       senna-docusate (SENOKOT-S;PERICOLACE) 8.6-50 MG per tablet Take 1 tablet by mouth 2 times daily And once daily PRN       SIMETHICONE-80 PO Take 160 mg by mouth 4 times daily as needed for intestinal gas        umeclidinium-vilanterol (ANORO ELLIPTA) 62.5-25 MCG/INH oral inhaler Inhale 1 puff into the lungs daily       Zinc 30 MG CAPS Take 30 mg by mouth daily       Medications reviewed:  Medications reconciled to facility chart and changes were made to reflect current medications as identified as above med list. Below are the changes that were made:   Medications stopped since last EPIC medication reconciliation:   There are no discontinued medications.    Medications started since last EPIC  medication reconciliation:  No orders of the defined types were placed in this encounter.        REVIEW OF SYSTEMS:  4 point ROS including Respiratory, CV, GI and , other than that noted in the HPI,  is negative    Physical Exam:  /76  Pulse 61  Temp 98.4  F (36.9  C)  Resp 18  Wt 193 lb 9.6 oz (87.8 kg)  SpO2 93%  BMI 34.29 kg/m2  GENERAL APPEARANCE:  Alert, in no distress, oriented, cooperative  RESP:  respiratory effort and palpation of chest normal, lungs clear to auscultation , no respiratory distress  CV:  Palpation and auscultation of heart done , regular rate and rhythm, no murmur, rub, or gallop, +2 pedal pulses, peripheral edema 1-2+ in BLE  ABDOMEN:  normal bowel sounds, soft, nontender, no hepatosplenomegaly or other masses, no guarding or rebound  M/S:   generalized weakness, slow gait with walker, derased ROM to lumbar spine, PETERSEN purposefuly  SKIN:  seroma palpable, in right inguinal area under panus, smalled in size, nontender  NEURO:   Cranial nerves 2-12 are normal tested and grossly at patient's baseline, Examination of sensation by touch is decreased bilat feet  PSYCH:  oriented X 3, normal insight, judgement and memory, affect and mood normal    Recent Labs:     CBC RESULTS:   Recent Labs   Lab Test  11/23/18   1042  11/14/18   0802   WBC  9.1  7.6   RBC  4.05  3.70*   HGB  11.1*  9.8*   HCT  36.8  32.9*   MCV  91  89   MCH  27.4  26.5   MCHC  30.2*  29.8*   RDW  17.0*  16.8*   PLT  202  193       Last Basic Metabolic Panel:  Recent Labs   Lab Test  11/28/18   0740  11/26/18   1120  11/26/18   0820   NA  141   --   139   POTASSIUM  5.1  6.1*  6.2*   CHLORIDE  103   --   107   BLAIR  9.1   --   9.7   CO2  31   --   28   BUN  51*   --   54*   CR  1.68*   --   1.68*   GLC  94   --   91       Liver Function Studies -   Recent Labs   Lab Test  06/25/18   1020   ALBUMIN  3.5       TSH   Date Value Ref Range Status   08/14/2017 0.62 0.40 - 4.00 mU/L Final   04/18/2007 0.60 0.4 - 5.0 mU/L  Final       Lab Results   Component Value Date    A1C 6.2 08/31/2017         Assessment/Plan:  (E87.5) Hyperkalemia  (primary encounter diagnosis)  Comment: now stable  Plan: continue to hold spironolactone, lisinopril, continue increased lasix dose     (N18.3) CKD (chronic kidney disease) stage 3, GFR 30-59 ml/min (H)  Comment: stable, mildly above baseline, weight stable  Plan: continue lasix as above, daily weights, BMP to monitor for stability on 12/3    (R19.09) Mass of right inguinal region  Comment: appears smaller in size, does not tolerate spanks for compression  Plan: lasix as above, compression as tolerated, likely to be an ongoing/re-occurring issues given previous groin surgery with lymph node involvement. F/u with vascular surgery as scheduled on 12/4    (M48.061) Spinal stenosis of lumbar region, unspecified whether neurogenic claudication present  Comment: pain controlled, mobility remains limited, awaiting surgery to be scheduled - nursing staff spoke with neurosurgery Dr Morris's office today, she wants patient to go to vascular apt next week and will call facility hopefully later to day to scheduled back surgery  Plan: continue Norco 1 tad TID and 1 tab PO q4h PRN, gabapentin 100mg q AM 200mg at bedtime, continue zinc, vitamin C,  PT/OT, neurosurgery to schedule surgery.     (R11.0) Nausea  Comment: improved, unclear etiology - pain likely contributing, previous use of oxycodone likely contributing.   Plan: continue Zofran 4mg q 8hr PRN, diet as tolerated, pain regimen as above,     Orders:  1. BMP on 12/3 Dx: CKD, hyperkalemia      Electronically signed by  MARY Gómez CNP            Sincerely,        MARY Gómez CNP

## 2018-11-29 ENCOUNTER — COMMUNICATION - HEALTHEAST (OUTPATIENT)
Dept: NEUROSURGERY | Facility: CLINIC | Age: 83
End: 2018-11-29

## 2018-11-30 ENCOUNTER — COMMUNICATION - HEALTHEAST (OUTPATIENT)
Dept: RESPIRATORY THERAPY | Facility: HOSPITAL | Age: 83
End: 2018-11-30

## 2018-12-03 ENCOUNTER — NURSING HOME VISIT (OUTPATIENT)
Dept: GERIATRICS | Facility: CLINIC | Age: 83
End: 2018-12-03
Payer: COMMERCIAL

## 2018-12-03 ENCOUNTER — HOSPITAL LABORATORY (OUTPATIENT)
Facility: OTHER | Age: 83
End: 2018-12-03

## 2018-12-03 VITALS
TEMPERATURE: 98.3 F | SYSTOLIC BLOOD PRESSURE: 123 MMHG | OXYGEN SATURATION: 93 % | BODY MASS INDEX: 33.98 KG/M2 | HEIGHT: 63 IN | HEART RATE: 61 BPM | WEIGHT: 191.8 LBS | DIASTOLIC BLOOD PRESSURE: 69 MMHG | RESPIRATION RATE: 18 BRPM

## 2018-12-03 DIAGNOSIS — R53.81 PHYSICAL DECONDITIONING: ICD-10-CM

## 2018-12-03 DIAGNOSIS — N18.30 CKD (CHRONIC KIDNEY DISEASE) STAGE 3, GFR 30-59 ML/MIN (H): Primary | ICD-10-CM

## 2018-12-03 DIAGNOSIS — I10 BENIGN ESSENTIAL HYPERTENSION: ICD-10-CM

## 2018-12-03 DIAGNOSIS — I50.9 CONGESTIVE HEART FAILURE, UNSPECIFIED HF CHRONICITY, UNSPECIFIED HEART FAILURE TYPE (H): ICD-10-CM

## 2018-12-03 DIAGNOSIS — M48.061 SPINAL STENOSIS OF LUMBAR REGION, UNSPECIFIED WHETHER NEUROGENIC CLAUDICATION PRESENT: ICD-10-CM

## 2018-12-03 LAB
ANION GAP SERPL CALCULATED.3IONS-SCNC: 5 MMOL/L (ref 3–14)
BUN SERPL-MCNC: 58 MG/DL (ref 7–30)
CALCIUM SERPL-MCNC: 9 MG/DL (ref 8.5–10.1)
CHLORIDE SERPL-SCNC: 105 MMOL/L (ref 94–109)
CO2 SERPL-SCNC: 30 MMOL/L (ref 20–32)
CREAT SERPL-MCNC: 1.68 MG/DL (ref 0.52–1.04)
GFR SERPL CREATININE-BSD FRML MDRD: 29 ML/MIN/1.7M2
GLUCOSE SERPL-MCNC: 91 MG/DL (ref 70–99)
POTASSIUM SERPL-SCNC: 4.4 MMOL/L (ref 3.4–5.3)
SODIUM SERPL-SCNC: 140 MMOL/L (ref 133–144)

## 2018-12-03 PROCEDURE — 99309 SBSQ NF CARE MODERATE MDM 30: CPT | Performed by: NURSE PRACTITIONER

## 2018-12-03 NOTE — PROGRESS NOTES
[facility]    Chief Complaint   Patient presents with     longterm Acute       [facility] Medical Record Number:  [id number]  Place of Service where encounter took place:  [facility] ([facility]) [[id number]]    HPI:    Jazmin Clifton is a 85 year old  (12/30/1932), who is being seen today for an episodic care visit.  HPI information obtained from: facility chart records, facility staff, patient report and [facility] chart review.    Today's concern is:     CKD (chronic kidney disease) stage 3, GFR 30-59 ml/min (H)  Spinal stenosis of lumbar region, unspecified whether neurogenic claudication present  Physical deconditioning  Benign essential hypertension  Congestive heart failure, unspecified HF chronicity, unspecified heart failure type (H)   Had K+ of 6.2 on 11/26, she was given 30gm of kayexalate, spironolactone discontinue (lisinopril discontinued previously) and furosemide increased to 40mg qAM and 20mg at bedtime. Labs drawn today and showed potassium stable at 4.4, creatinine 1.68. Weight has been stable. 190-193 over past few days. She reports she is feeling well, no longer have loose stools from kayexalate. Had good pain control over the weekend, denies any pain on exam today, she is looking forward to having back surgery as she would like to get this dealt with. Denies any lightheadedness, dizziness or chest pain.     ALLERGIES: Accupril; Ace inhibitors; Augmentin; Levofloxacin; Macrobid [nitrofurantoin]; Morphine; Norvasc [amlodipine besylate]; and Quinapril  Past Medical, Surgical, Family and Social History reviewed and updated in [facility].    Current Outpatient Prescriptions   Medication Sig Dispense Refill     Ascorbic Acid (VITAMIN C PO) Take 500 mg by mouth daily       aspirin 81 MG chewable tablet Take 81 mg by mouth       atorvastatin (LIPITOR) 40 MG tablet TAKE 1 TAB BY MOUTH AT BEDTIME FOR HIGH CHOLESTEROL 30 tablet 3     B COMPLEX-C-E-ZN PO Take 1 tablet by mouth  daily       bisacodyl (DULCOLAX) 10 MG Suppository Place 10 mg rectally daily as needed for constipation       CALCIUM 600+D 600-200 MG-UNIT TABS TAKE 1 TAB BY MOUTH ONCE DAILY 30 tablet 11     calcium carbonate (TUMS) 500 MG chewable tablet Take 1 chew tab by mouth every hour as needed for heartburn       CYCLOBENZAPRINE HCL PO Take 5 mg by mouth every 8 hours as needed for muscle spasms        DONEPEZIL HCL PO Take 5 mg by mouth daily       DULoxetine HCl (CYMBALTA PO) Take 30 mg by mouth daily       ESTRIOL 1MG/GRAM CREAM Apply 1 g topically See Admin Instructions Insert 1 application vaginally at bedtime every Mon, Wed, Fri for vaginal dryness, burning, itching apply pea size amount vaginally at bedtime three times a week       fluticasone (FLONASE) 50 MCG/ACT spray Spray 1 spray into both nostrils 2 times daily as needed        fluticasone-vilanterol (BREO ELLIPTA) 100-25 MCG/INH oral inhaler Inhale 1 puff into the lungs daily       Furosemide (LASIX PO) Take 40 mg by mouth daily And 20mg q afternoon.       FUROSEMIDE PO Take 20 mg by mouth daily as needed for weight gain daily for >2 pound weight gain in one day       GABAPENTIN PO Take 100 mg by mouth every morning       GABAPENTIN PO Take 200 mg by mouth At Bedtime        guaiFENesin (ROBITUSSIN) 100 MG/5ML SYRP Take 10 mLs by mouth every 4 hours as needed for cough        HYDROcodone-acetaminophen (NORCO) 5-325 MG tablet Take 1 tablet by mouth 3 times daily       HYDROcodone-acetaminophen (NORCO) 5-325 MG tablet Take 1 tablet by mouth every 4 hours as needed for severe pain       hydrocortisone 1 % CREA cream Apply topically every 6 hours as needed for itching       isosorbide mononitrate (IMDUR) 30 MG 24 hr tablet TAKE 1 TAB BY MOUTH ONCE DAILY FOR HYPERTENSION/HIGH BLOOD PRESSURE 30 tablet 3     isradipine (DYNACIRC) 5 MG capsule Take 5 mg by mouth 2 times daily       levalbuterol (XOPENEX) 1.25 MG/3ML neb solution Take 1 ampule by nebulization every 4  hours as needed for shortness of breath / dyspnea or wheezing       loperamide (IMODIUM) 2 MG capsule Take 2 mg by mouth 4 times daily as needed for diarrhea       magnesium hydroxide (MILK OF MAGNESIA) 400 MG/5ML suspension Take 30 mLs by mouth daily as needed for constipation or heartburn       MAGNESIUM OXIDE PO Take 250 mg by mouth daily       memantine (NAMENDA) 5 MG tablet TAKE 1 TAB BY MOUTH ONCE DAILY FOR DEMENTIA 30 tablet 3     Menthol, Topical Analgesic, (BIOFREEZE) 4 % GEL Externally apply topically daily And Three times a day PRN       methylPREDNISolone (MEDROL DOSEPAK) 4 MG tablet Take 4 mg by mouth daily follow package directions       nystatin (MYCOSTATIN) 140266 UNIT/GM POWD Apply topically 2 times daily as needed        Ondansetron (ZOFRAN ODT PO) Take 4 mg by mouth every 8 hours as needed for nausea       pantoprazole (PROTONIX) 40 MG EC tablet TAKE 1 TAB BY MOUTH ONCE DAILY BEFORE BREAKFAST FOR GERD 30 tablet 3     polyethylene glycol (MIRALAX/GLYCOLAX) Packet Take 17 g by mouth daily        polyvinyl alcohol (LIQUIFILM TEARS) 1.4 % ophthalmic solution Place 1 drop into both eyes 2 times daily And 4 times a day PRN       senna-docusate (SENOKOT-S;PERICOLACE) 8.6-50 MG per tablet Take 1 tablet by mouth 2 times daily And once daily PRN       SIMETHICONE-80 PO Take 160 mg by mouth 4 times daily as needed for intestinal gas        umeclidinium-vilanterol (ANORO ELLIPTA) 62.5-25 MCG/INH oral inhaler Inhale 1 puff into the lungs daily       Zinc 30 MG CAPS Take 30 mg by mouth daily       Medications reviewed:  Medications reconciled to facility chart and changes were made to reflect current medications as identified as above med list. Below are the changes that were made:   Medications stopped since last EPIC medication reconciliation:   There are no discontinued medications.    Medications started since last [facility] medication reconciliation:  No orders of the defined types were placed in this  "encounter.        REVIEW OF SYSTEMS:  4 point ROS including Respiratory, CV, GI and , other than that noted in the HPI,  is negative    Physical Exam:  /69  Pulse 61  Temp 98.3  F (36.8  C)  Resp 18  Ht 5' 3\" (1.6 m)  Wt 191 lb 12.8 oz (87 kg)  SpO2 93%  BMI 33.98 kg/m2  GENERAL APPEARANCE:  Alert, in no distress, appears healthy, oriented  RESP:  respiratory effort and palpation of chest normal, lungs clear to auscultation , no respiratory distress  CV:  Palpation and auscultation of heart done , regular rate and rhythm, no murmur, rub, or gallop, +2 pedal pulses, peripheral edema 2+ in BLE  ABDOMEN:  normal bowel sounds, soft, nontender, no hepatosplenomegaly or other masses, no guarding or rebound  M/S:   small steps, slow gait, slightly stooped, mild kyphosis,   SKIN:  soft seromna to right thigh, non-tender  NEURO:   Cranial nerves 2-12 are normal tested and grossly at patient's baseline, Examination of sensation by touch is decreased bilat feet  PSYCH:  oriented X 3, normal insight, judgement and memory, affect and mood normal    Recent Labs:     CBC RESULTS:   Recent Labs   Lab Test  11/23/18   1042  11/14/18   0802   WBC  9.1  7.6   RBC  4.05  3.70*   HGB  11.1*  9.8*   HCT  36.8  32.9*   MCV  91  89   MCH  27.4  26.5   MCHC  30.2*  29.8*   RDW  17.0*  16.8*   PLT  202  193       Last Basic Metabolic Panel:  Recent Labs   Lab Test  12/03/18   0755  11/28/18   0740   NA  140  141   POTASSIUM  4.4  5.1   CHLORIDE  105  103   BLAIR  9.0  9.1   CO2  30  31   BUN  58*  51*   CR  1.68*  1.68*   GLC  91  94       Liver Function Studies -   Recent Labs   Lab Test  06/25/18   1020   ALBUMIN  3.5       TSH   Date Value Ref Range Status   08/14/2017 0.62 0.40 - 4.00 mU/L Final   04/18/2007 0.60 0.4 - 5.0 mU/L Final       Lab Results   Component Value Date    A1C 6.2 08/31/2017         Assessment/Plan:  (N18.3) CKD (chronic kidney disease) stage 3, GFR 30-59 ml/min (H)  (primary encounter " diagnosis)  Comment: stable with medication changes  Plan: continue to hold lisinopril and spirolactone, continue lasix as above, daily weights, low potassium diet, BMP in 1 week if still in TCU.     (M48.061) Spinal stenosis of lumbar region, unspecified whether neurogenic claudication present  (R53.81) Physical deconditioning  Comment: Pain controlled, mobility improving, nearing therapy goals; awaiting prior authorization for surgery   Plan: Continue Norco 1 tab TID and q4h PRN, gabapentin 100mg q AM, 200mg at bedtime, zinc supplement, vitmain C, PT/OT, neurosurgery to schedule - if too far out will plan to discontinue back to [facility] as nearing therapy goals    (I10) Benign essential hypertension  (I50.9) Congestive heart failure, unspecified HF chronicity, unspecified heart failure type (H)  Comment: Compensated, BP controlled off lisinopril and spironolactone  Plan: continue lasix as above, ASA 81mg, Imdur 30mg daily, daily weights, monitor and adjust          Orders:  1. BMP on 12/10 Dx: CKD   2. When zinc supply gone start zinc 25 mg PO Q day (1/2 of 50 mg tab) Dx: Supplement, Neurosurgery      Electronically signed by  MARY Gómez CNP

## 2018-12-03 NOTE — LETTER
12/3/2018        RE: [facility]  [address] [id number]        [facility]    Chief Complaint   Patient presents with     residential Acute       [facility] Medical Record Number:  [id number]  Place of Service where encounter took place:  [facility] ([facility]) [[id number]]    HPI:    Jazmin Clifton is a 85 year old  (12/30/1932), who is being seen today for an episodic care visit.  HPI information obtained from: facility chart records, facility staff, patient report and [facility] chart review.    Today's concern is:     CKD (chronic kidney disease) stage 3, GFR 30-59 ml/min (H)  Spinal stenosis of lumbar region, unspecified whether neurogenic claudication present  Physical deconditioning  Benign essential hypertension  Congestive heart failure, unspecified HF chronicity, unspecified heart failure type (H)   Had K+ of 6.2 on 11/26, she was given 30gm of kayexalate, spironolactone discontinue (lisinopril discontinued previously) and furosemide increased to 40mg qAM and 20mg at bedtime. Labs drawn today and showed potassium stable at 4.4, creatinine 1.68. Weight has been stable. 190-193 over past few days. She reports she is feeling well, no longer have loose stools from kayexalate. Had good pain control over the weekend, denies any pain on exam today, she is looking forward to having back surgery as she would like to get this dealt with. Denies any lightheadedness, dizziness or chest pain.     ALLERGIES: Accupril; Ace inhibitors; Augmentin; Levofloxacin; Macrobid [nitrofurantoin]; Morphine; Norvasc [amlodipine besylate]; and Quinapril  Past Medical, Surgical, Family and Social History reviewed and updated in [facility].    Current Outpatient Prescriptions   Medication Sig Dispense Refill     Ascorbic Acid (VITAMIN C PO) Take 500 mg by mouth daily       aspirin 81 MG chewable tablet Take 81 mg by mouth       atorvastatin (LIPITOR) 40 MG  tablet TAKE 1 TAB BY MOUTH AT BEDTIME FOR HIGH CHOLESTEROL 30 tablet 3     B COMPLEX-C-E-ZN PO Take 1 tablet by mouth daily       bisacodyl (DULCOLAX) 10 MG Suppository Place 10 mg rectally daily as needed for constipation       CALCIUM 600+D 600-200 MG-UNIT TABS TAKE 1 TAB BY MOUTH ONCE DAILY 30 tablet 11     calcium carbonate (TUMS) 500 MG chewable tablet Take 1 chew tab by mouth every hour as needed for heartburn       CYCLOBENZAPRINE HCL PO Take 5 mg by mouth every 8 hours as needed for muscle spasms        DONEPEZIL HCL PO Take 5 mg by mouth daily       DULoxetine HCl (CYMBALTA PO) Take 30 mg by mouth daily       ESTRIOL 1MG/GRAM CREAM Apply 1 g topically See Admin Instructions Insert 1 application vaginally at bedtime every Mon, Wed, Fri for vaginal dryness, burning, itching apply pea size amount vaginally at bedtime three times a week       fluticasone (FLONASE) 50 MCG/ACT spray Spray 1 spray into both nostrils 2 times daily as needed        fluticasone-vilanterol (BREO ELLIPTA) 100-25 MCG/INH oral inhaler Inhale 1 puff into the lungs daily       Furosemide (LASIX PO) Take 40 mg by mouth daily And 20mg q afternoon.       FUROSEMIDE PO Take 20 mg by mouth daily as needed for weight gain daily for >2 pound weight gain in one day       GABAPENTIN PO Take 100 mg by mouth every morning       GABAPENTIN PO Take 200 mg by mouth At Bedtime        guaiFENesin (ROBITUSSIN) 100 MG/5ML SYRP Take 10 mLs by mouth every 4 hours as needed for cough        HYDROcodone-acetaminophen (NORCO) 5-325 MG tablet Take 1 tablet by mouth 3 times daily       HYDROcodone-acetaminophen (NORCO) 5-325 MG tablet Take 1 tablet by mouth every 4 hours as needed for severe pain       hydrocortisone 1 % CREA cream Apply topically every 6 hours as needed for itching       isosorbide mononitrate (IMDUR) 30 MG 24 hr tablet TAKE 1 TAB BY MOUTH ONCE DAILY FOR HYPERTENSION/HIGH BLOOD PRESSURE 30 tablet 3     isradipine (DYNACIRC) 5 MG capsule Take 5 mg  by mouth 2 times daily       levalbuterol (XOPENEX) 1.25 MG/3ML neb solution Take 1 ampule by nebulization every 4 hours as needed for shortness of breath / dyspnea or wheezing       loperamide (IMODIUM) 2 MG capsule Take 2 mg by mouth 4 times daily as needed for diarrhea       magnesium hydroxide (MILK OF MAGNESIA) 400 MG/5ML suspension Take 30 mLs by mouth daily as needed for constipation or heartburn       MAGNESIUM OXIDE PO Take 250 mg by mouth daily       memantine (NAMENDA) 5 MG tablet TAKE 1 TAB BY MOUTH ONCE DAILY FOR DEMENTIA 30 tablet 3     Menthol, Topical Analgesic, (BIOFREEZE) 4 % GEL Externally apply topically daily And Three times a day PRN       methylPREDNISolone (MEDROL DOSEPAK) 4 MG tablet Take 4 mg by mouth daily follow package directions       nystatin (MYCOSTATIN) 850514 UNIT/GM POWD Apply topically 2 times daily as needed        Ondansetron (ZOFRAN ODT PO) Take 4 mg by mouth every 8 hours as needed for nausea       pantoprazole (PROTONIX) 40 MG EC tablet TAKE 1 TAB BY MOUTH ONCE DAILY BEFORE BREAKFAST FOR GERD 30 tablet 3     polyethylene glycol (MIRALAX/GLYCOLAX) Packet Take 17 g by mouth daily        polyvinyl alcohol (LIQUIFILM TEARS) 1.4 % ophthalmic solution Place 1 drop into both eyes 2 times daily And 4 times a day PRN       senna-docusate (SENOKOT-S;PERICOLACE) 8.6-50 MG per tablet Take 1 tablet by mouth 2 times daily And once daily PRN       SIMETHICONE-80 PO Take 160 mg by mouth 4 times daily as needed for intestinal gas        umeclidinium-vilanterol (ANORO ELLIPTA) 62.5-25 MCG/INH oral inhaler Inhale 1 puff into the lungs daily       Zinc 30 MG CAPS Take 30 mg by mouth daily       Medications reviewed:  Medications reconciled to facility chart and changes were made to reflect current medications as identified as above med list. Below are the changes that were made:   Medications stopped since last EPIC medication reconciliation:   There are no discontinued  "medications.    Medications started since last [facility] medication reconciliation:  No orders of the defined types were placed in this encounter.        REVIEW OF SYSTEMS:  4 point ROS including Respiratory, CV, GI and , other than that noted in the HPI,  is negative    Physical Exam:  /69  Pulse 61  Temp 98.3  F (36.8  C)  Resp 18  Ht 5' 3\" (1.6 m)  Wt 191 lb 12.8 oz (87 kg)  SpO2 93%  BMI 33.98 kg/m2  GENERAL APPEARANCE:  Alert, in no distress, appears healthy, oriented  RESP:  respiratory effort and palpation of chest normal, lungs clear to auscultation , no respiratory distress  CV:  Palpation and auscultation of heart done , regular rate and rhythm, no murmur, rub, or gallop, +2 pedal pulses, peripheral edema 2+ in BLE  ABDOMEN:  normal bowel sounds, soft, nontender, no hepatosplenomegaly or other masses, no guarding or rebound  M/S:   small steps, slow gait, slightly stooped, mild kyphosis,   SKIN:  soft seromna to right thigh, non-tender  NEURO:   Cranial nerves 2-12 are normal tested and grossly at patient's baseline, Examination of sensation by touch is decreased bilat feet  PSYCH:  oriented X 3, normal insight, judgement and memory, affect and mood normal    Recent Labs:     CBC RESULTS:   Recent Labs   Lab Test  11/23/18   1042  11/14/18   0802   WBC  9.1  7.6   RBC  4.05  3.70*   HGB  11.1*  9.8*   HCT  36.8  32.9*   MCV  91  89   MCH  27.4  26.5   MCHC  30.2*  29.8*   RDW  17.0*  16.8*   PLT  202  193       Last Basic Metabolic Panel:  Recent Labs   Lab Test  12/03/18   0755  11/28/18   0740   NA  140  141   POTASSIUM  4.4  5.1   CHLORIDE  105  103   BLAIR  9.0  9.1   CO2  30  31   BUN  58*  51*   CR  1.68*  1.68*   GLC  91  94       Liver Function Studies -   Recent Labs   Lab Test  06/25/18   1020   ALBUMIN  3.5       TSH   Date Value Ref Range Status   08/14/2017 0.62 0.40 - 4.00 mU/L Final   04/18/2007 0.60 0.4 - 5.0 mU/L Final       Lab Results   Component Value Date    A1C 6.2 08/31/2017 "         Assessment/Plan:  (N18.3) CKD (chronic kidney disease) stage 3, GFR 30-59 ml/min (H)  (primary encounter diagnosis)  Comment: stable with medication changes  Plan: continue to hold lisinopril and spirolactone, continue lasix as above, daily weights, low potassium diet, BMP in 1 week if still in TCU.     (M48.061) Spinal stenosis of lumbar region, unspecified whether neurogenic claudication present  (R53.81) Physical deconditioning  Comment: Pain controlled, mobility improving, nearing therapy goals; awaiting prior authorization for surgery   Plan: Continue Norco 1 tab TID and q4h PRN, gabapentin 100mg q AM, 200mg at bedtime, zinc supplement, vitmain C, PT/OT, neurosurgery to schedule - if too far out will plan to discontinue back to [facility] as nearing therapy goals    (I10) Benign essential hypertension  (I50.9) Congestive heart failure, unspecified HF chronicity, unspecified heart failure type (H)  Comment: Compensated, BP controlled off lisinopril and spironolactone  Plan: continue lasix as above, ASA 81mg, Imdur 30mg daily, daily weights, monitor and adjust          Orders:  1. BMP on 12/10 Dx: CKD   2. When zinc supply gone start zinc 25 mg PO Q day (1/2 of 50 mg tab) Dx: Supplement, Neurosurgery      Electronically signed by  MARY Gómez CNP        Sincerely,        MARY Gómez CNP

## 2018-12-04 ENCOUNTER — OFFICE VISIT - HEALTHEAST (OUTPATIENT)
Dept: VASCULAR SURGERY | Facility: CLINIC | Age: 83
End: 2018-12-04

## 2018-12-04 DIAGNOSIS — R53.1 WEAKNESS: ICD-10-CM

## 2018-12-04 DIAGNOSIS — M25.552 PAIN OF LEFT HIP JOINT: ICD-10-CM

## 2018-12-04 DIAGNOSIS — R19.09 RIGHT GROIN MASS: ICD-10-CM

## 2018-12-04 DIAGNOSIS — I50.32 CHRONIC DIASTOLIC CONGESTIVE HEART FAILURE (H): ICD-10-CM

## 2018-12-04 DIAGNOSIS — Z79.52 CURRENT CHRONIC USE OF SYSTEMIC STEROIDS: ICD-10-CM

## 2018-12-04 DIAGNOSIS — I87.2 VENOUS INSUFFICIENCY OF BOTH LOWER EXTREMITIES: ICD-10-CM

## 2018-12-04 DIAGNOSIS — R26.2 IMPAIRED AMBULATION: ICD-10-CM

## 2018-12-04 DIAGNOSIS — M48.00 SPINAL STENOSIS, UNSPECIFIED SPINAL REGION: ICD-10-CM

## 2018-12-04 DIAGNOSIS — S92.302S MULTIPLE CLOSED FRACTURES OF METATARSAL BONE OF LEFT FOOT, SEQUELA: ICD-10-CM

## 2018-12-04 DIAGNOSIS — R60.9 DEPENDENT EDEMA: ICD-10-CM

## 2018-12-04 DIAGNOSIS — N18.30 STAGE 3 CHRONIC KIDNEY DISEASE (H): ICD-10-CM

## 2018-12-04 DIAGNOSIS — I87.303 VENOUS HYPERTENSION OF BOTH LOWER EXTREMITIES: ICD-10-CM

## 2018-12-04 ASSESSMENT — MIFFLIN-ST. JEOR: SCORE: 1286.37

## 2018-12-07 ENCOUNTER — DISCHARGE SUMMARY NURSING HOME (OUTPATIENT)
Dept: GERIATRICS | Facility: CLINIC | Age: 83
End: 2018-12-07
Payer: COMMERCIAL

## 2018-12-07 VITALS
TEMPERATURE: 98 F | RESPIRATION RATE: 18 BRPM | HEART RATE: 62 BPM | BODY MASS INDEX: 34.33 KG/M2 | OXYGEN SATURATION: 93 % | DIASTOLIC BLOOD PRESSURE: 77 MMHG | SYSTOLIC BLOOD PRESSURE: 144 MMHG | WEIGHT: 193.8 LBS

## 2018-12-07 DIAGNOSIS — F03.90 DEMENTIA WITHOUT BEHAVIORAL DISTURBANCE, UNSPECIFIED DEMENTIA TYPE: ICD-10-CM

## 2018-12-07 DIAGNOSIS — E87.5 HYPERKALEMIA: ICD-10-CM

## 2018-12-07 DIAGNOSIS — K59.01 SLOW TRANSIT CONSTIPATION: ICD-10-CM

## 2018-12-07 DIAGNOSIS — I50.9 CONGESTIVE HEART FAILURE, UNSPECIFIED HF CHRONICITY, UNSPECIFIED HEART FAILURE TYPE (H): ICD-10-CM

## 2018-12-07 DIAGNOSIS — I10 BENIGN ESSENTIAL HYPERTENSION: ICD-10-CM

## 2018-12-07 DIAGNOSIS — R11.0 NAUSEA: ICD-10-CM

## 2018-12-07 DIAGNOSIS — M80.00XD OSTEOPOROSIS WITH CURRENT PATHOLOGICAL FRACTURE WITH ROUTINE HEALING, UNSPECIFIED OSTEOPOROSIS TYPE, SUBSEQUENT ENCOUNTER: ICD-10-CM

## 2018-12-07 DIAGNOSIS — F41.8 DEPRESSION WITH ANXIETY: ICD-10-CM

## 2018-12-07 DIAGNOSIS — R19.09 MASS OF RIGHT INGUINAL REGION: ICD-10-CM

## 2018-12-07 DIAGNOSIS — N18.30 CKD (CHRONIC KIDNEY DISEASE) STAGE 3, GFR 30-59 ML/MIN (H): ICD-10-CM

## 2018-12-07 DIAGNOSIS — R53.81 PHYSICAL DECONDITIONING: ICD-10-CM

## 2018-12-07 DIAGNOSIS — K21.9 GASTROESOPHAGEAL REFLUX DISEASE WITHOUT ESOPHAGITIS: ICD-10-CM

## 2018-12-07 DIAGNOSIS — Z86.73 H/O: CVA (CEREBROVASCULAR ACCIDENT): ICD-10-CM

## 2018-12-07 DIAGNOSIS — J44.9 CHRONIC OBSTRUCTIVE PULMONARY DISEASE, UNSPECIFIED COPD TYPE (H): ICD-10-CM

## 2018-12-07 DIAGNOSIS — M48.061 SPINAL STENOSIS OF LUMBAR REGION, UNSPECIFIED WHETHER NEUROGENIC CLAUDICATION PRESENT: Primary | ICD-10-CM

## 2018-12-07 DIAGNOSIS — I25.10 CORONARY ARTERY DISEASE INVOLVING NATIVE CORONARY ARTERY OF NATIVE HEART WITHOUT ANGINA PECTORIS: ICD-10-CM

## 2018-12-07 PROCEDURE — 99316 NF DSCHRG MGMT 30 MIN+: CPT | Performed by: NURSE PRACTITIONER

## 2018-12-07 NOTE — PROGRESS NOTES
[place] [facility] DISCHARGE SUMMARY    PATIENT'S NAME: Jazmin Clifton  YOB: 1932  MEDICAL RECORD NUMBER:  [id number]  Place of Service where encounter took place:  [facility] ([facility]) [[id number]]    PRIMARY CARE PROVIDER AND CLINIC RESPONSIBLE AFTER TRANSFER: Roby Willis PHYSICIAN [facility] [address] [facility] / [facility] [address]    TRANSFERRING PROVIDERS: MARY Gómez CNP, Ana Cristina Yu MD  DATE OF SNF ADMISSION:  November / 06 / 2018  DATE OF SNF (anticipated) DISCHARGE: December / 11 / 2018  DISCHARGE DISPOSITION: Assisted Living: other   RECENT HOSPITALIZATION/ED:  [facility] stay 10/29/18 to 11/6/18.     CODE STATUS/ADVANCE DIRECTIVES DISCUSSION:   CPR/Full code      Allergies   Allergen Reactions     Accupril      Ace Inhibitors Unknown     Accupril     Augmentin Unknown     Levofloxacin Unknown     Macrobid [Nitrofurantoin]      Morphine Other (See Comments)     hallucinations     Norvasc [Amlodipine Besylate]      Leg swelling       Quinapril      Condition on Discharge:  Improving.  Function:  Independent with 2WW with transfers and ambulation, personal hygiene, toileting, bed mobility, and upper body. Ext assist of one with lower body and TG shapes.  Cognitive Scores: SLUMS 26/30 and Safety 21/22    Equipment: walker    DISCHARGE DIAGNOSIS:   1. Spinal stenosis of lumbar region, unspecified whether neurogenic claudication present    2. Physical deconditioning    3. CKD (chronic kidney disease) stage 3, GFR 30-59 ml/min (H)    4. Hyperkalemia    5. Congestive heart failure, unspecified HF chronicity, unspecified heart failure type (H)    6. Coronary artery disease involving native coronary artery of native heart without angina pectoris    7. Benign essential hypertension    8. Mass of right inguinal region    9. Nausea    10. Slow transit constipation    11. Chronic obstructive pulmonary disease, unspecified COPD type (H)    12.  Depression with anxiety    13. H/O: CVA (cerebrovascular accident)    14. Gastroesophageal reflux disease without esophagitis    15. Dementia without behavioral disturbance, unspecified dementia type    16. Osteoporosis with current pathological fracture with routine healing, unspecified osteoporosis type, subsequent encounter        HPI Nursing Facility Course:  HPI information obtained from: facility chart records, facility staff, patient report and [facility] chart review.    Jazmin Clifton is a 85 y.o. Female with PMH of CKD, CHF, COPD, previous laminectomy, dementia and falls with left metatarsal fracture who was admitted 10/30/18 with back pain and found to have acute on chronic kidney disease which improved with IV fluids. MRI lumbar spine revealed moderate to marked canal stenosis at L1-L2 and spine was consulted, plan for outpatient surgery in the next week. She was given course of medrol. Recent UTI but UC done while IP was negative. Neurosurgery recommended starting zinc and vitamin C. Right renal cyst found on CT scan, f/u neuro imaging recommended in 3 months. Creatinine improved to baseline, PVR's checked, highest was 112. ACE and spironolactone held while IP, but resumed on discharge. Did  have some nausea while IP, was given zofran with improvement. When medically stable she was discharged to TCU for rehab and medical management with plan to f/u with neurosurgery on 11/9, likely surgery next week.     Spinal stenosis of lumbar region, unspecified whether neurogenic claudication present  Physical deconditioning  Planned for L1-2 decompression with Dr. Morris, however she developed large right groin seroma, so neurosurgery delayed surgery until cleared by general surgery and she had her vascular surgery apt. Initially had issues with significant pain and asked to be changed from norco to oxycodone as she felt she had better pain control on oxycodone. However, she developed nausea and reports in  general she did not feel well, and was changed back to Norco with improvement. Does have some break through pain, but has been well managed on Norco 1 tab TID and q4h PRN, gabapentin 100mg q AM, 200mg at bedtime, Biofreeze TID, cyclobenzaprine TID PRN, . She remains on methylprednisolone 4mg daily, was started on zinc and vitamin C supplement per neurosurgery. She has reached goals with PT and OT. She is currently wait from prior authorization from insurance company before surgery can be scheduled, so she is discharging back to her LTC facility.     CKD (chronic kidney disease) stage 3, GFR 30-59 ml/min (H)  Hyperkalemia  K+ of 5.8 on 11/23, was given 15mg of kayexalate and lisinopril discontinued. BMP drawn again on 11/26 and Had K+ of 6.2, she was given 30gm of kayexalate, spironolactone discontinued and furosemide increased to 40mg qAM and 20mg at bedtime. Placed on a low potassium diet. Recheck of K+ WNL, Creatinine has remained slightly above baseline at 1.68 (baseline ~1.1-1.3).     Congestive heart failure, unspecified HF chronicity, unspecified heart failure type (H)  Coronary artery disease involving native coronary artery of native heart without angina pectoris  Benign essential hypertension  Spironolactone and lisinopril discontinued d/t hyperkalemia, lasix increased to 40mg q AM and 20mg at bedtime. Weight remained stable 190-193lbs, BP stable. She was seen vascular surgery, who recommended compression stockings, gave ok for neurosurgery. She is on ASA 81mg daiy, atorvastatin, imdur 30mg daily, isradipine 5mg daily,     Mass of right inguinal region  Found to large mass to right groin on 11/13/18, confirmed to be seroma via ultrasound. She was referred to general surgery, felt likely related from previous cyst removal surgery which did have some lymph node involvement. He recommended wearing Spanks to appl compression to the site. She did wear these briefly in TCU, however she found them to be too  uncomfortable and tight to wear, so she stopped wearing them. Seroma improving prior to TCU discharge     Nausea  Had intermittent issues with nausea in TCU, suspect r/t pain and and narcotics. Improved after she was changed back to norco, no recent nausea by time of discharge. Does PRN Zofran available.     Slow transit constipation  Having regular bowel movements on miralax 17gm daily, senna-s 1 tab BID and 1 tab PRN daily.     Chronic obstructive pulmonary disease, unspecified COPD type (H)  No concerns of exacerbation in TCU, stable on Breo Ellipta 1 puff daily, xopenex PRN, and Anoro Ellipta 1 puff daily.     Depression with anxiety  Stable on duloxetine 30mg daily, if kidney function remains elevated consider GDR or alternate therapy    H/O: CVA (cerebrovascular accident)  Chronic left sided weakness, at baseline. On statin, ASA    Gastroesophageal reflux disease without esophagitis  Had difficulty swallowing pills and reported some heartburn despite being on Protonix 40 mg daily, Discussed with pharmacy, they recommended changing to omeprazole 40mg daily as might be more effective, this was done prior to discharge. SLP followed to work on swallowing techniques.     Dementia without behavioral disturbance, unspecified dementia type  Fair historian, no behavioral issues, remains on Namenda 5mg daily and and donepezil 5mg daily.     Osteoporosis with current pathological fracture with routine healing, unspecified osteoporosis type, subsequent encounter        PAST MEDICAL HISTORY:  has a past medical history of ABDOMINAL PAIN GENERALIZED (3/15/2006); Abdominal pain, generalized (3/15/2006); Atherosclerosis of renal artery (H); BENIGN HYPERTENSION (5/1/2006); Benign neoplasm of scalp and skin of neck; Cerebral aneurysm, nonruptured; Depressive disorder, not elsewhere classified; Esophageal reflux; Female stress incontinence; Gastrointestinal malfunction arising from mental factors; Generalized osteoarthrosis,  unspecified site; Herpes zoster dermatitis of eyelid; Insomnia, unspecified; Lumbago; Other chest pain; Other specified cardiac dysrhythmias(427.89); Rectocele; Unspecified disorder of kidney and ureter; and Unspecified essential hypertension.    DISCHARGE MEDICATIONS:  Current Outpatient Prescriptions   Medication Sig Dispense Refill     Ascorbic Acid (VITAMIN C PO) Take 500 mg by mouth daily       aspirin 81 MG chewable tablet Take 81 mg by mouth       atorvastatin (LIPITOR) 40 MG tablet TAKE 1 TAB BY MOUTH AT BEDTIME FOR HIGH CHOLESTEROL 30 tablet 3     B COMPLEX-C-E-ZN PO Take 1 tablet by mouth daily       CALCIUM 600+D 600-200 MG-UNIT TABS TAKE 1 TAB BY MOUTH ONCE DAILY 30 tablet 11     calcium carbonate (TUMS) 500 MG chewable tablet Take 1 chew tab by mouth every hour as needed for heartburn       CYCLOBENZAPRINE HCL PO Take 5 mg by mouth every 8 hours as needed for muscle spasms        DONEPEZIL HCL PO Take 5 mg by mouth daily       DULoxetine HCl (CYMBALTA PO) Take 30 mg by mouth daily       ESTRIOL 1MG/GRAM CREAM Apply 1 g topically See Admin Instructions Insert 1 application vaginally at bedtime every Mon, Wed, Fri for vaginal dryness, burning, itching apply pea size amount vaginally at bedtime three times a week       fluticasone (FLONASE) 50 MCG/ACT spray Spray 1 spray into both nostrils 2 times daily as needed        fluticasone-vilanterol (BREO ELLIPTA) 100-25 MCG/INH oral inhaler Inhale 1 puff into the lungs daily       Furosemide (LASIX PO) Take 40 mg by mouth daily And 20mg q afternoon.       FUROSEMIDE PO Take 20 mg by mouth daily as needed for weight gain daily for >2 pound weight gain in one day       GABAPENTIN PO Take 100 mg by mouth every morning       GABAPENTIN PO Take 200 mg by mouth At Bedtime        guaiFENesin (ROBITUSSIN) 100 MG/5ML SYRP Take 10 mLs by mouth every 4 hours as needed for cough        HYDROcodone-acetaminophen (NORCO) 5-325 MG tablet Take 1 tablet by mouth 3 times daily        HYDROcodone-acetaminophen (NORCO) 5-325 MG tablet Take 1 tablet by mouth every 4 hours as needed for severe pain       hydrocortisone 1 % CREA cream Apply topically every 6 hours as needed for itching       isosorbide mononitrate (IMDUR) 30 MG 24 hr tablet TAKE 1 TAB BY MOUTH ONCE DAILY FOR HYPERTENSION/HIGH BLOOD PRESSURE 30 tablet 3     isradipine (DYNACIRC) 5 MG capsule Take 5 mg by mouth 2 times daily       levalbuterol (XOPENEX) 1.25 MG/3ML neb solution Take 1 ampule by nebulization every 4 hours as needed for shortness of breath / dyspnea or wheezing       loperamide (IMODIUM) 2 MG capsule Take 2 mg by mouth 4 times daily as needed for diarrhea       magnesium hydroxide (MILK OF MAGNESIA) 400 MG/5ML suspension Take 30 mLs by mouth daily as needed for constipation or heartburn       MAGNESIUM OXIDE PO Take 250 mg by mouth daily       memantine (NAMENDA) 5 MG tablet TAKE 1 TAB BY MOUTH ONCE DAILY FOR DEMENTIA 30 tablet 3     Menthol, Topical Analgesic, (BIOFREEZE) 4 % GEL Externally apply topically daily And Three times a day PRN       methylPREDNISolone (MEDROL DOSEPAK) 4 MG tablet Take 4 mg by mouth daily follow package directions       nystatin (MYCOSTATIN) 361785 UNIT/GM POWD Apply topically 2 times daily as needed        Ondansetron (ZOFRAN ODT PO) Take 4 mg by mouth every 8 hours as needed for nausea       polyethylene glycol (MIRALAX/GLYCOLAX) Packet Take 17 g by mouth daily        polyvinyl alcohol (LIQUIFILM TEARS) 1.4 % ophthalmic solution Place 1 drop into both eyes 2 times daily And 4 times a day PRN       senna-docusate (SENOKOT-S;PERICOLACE) 8.6-50 MG per tablet Take 1 tablet by mouth 2 times daily And once daily PRN       SIMETHICONE-80 PO Take 160 mg by mouth 4 times daily as needed for intestinal gas        umeclidinium-vilanterol (ANORO ELLIPTA) 62.5-25 MCG/INH oral inhaler Inhale 1 puff into the lungs daily       Zinc 30 MG CAPS Take 30 mg by mouth daily         MEDICATION  CHANGES/RATIONALE:   Fosamax on hold while in TCU, resume on discharge. Remains on calcium, vitamin D supplement.     Controlled medications sent with patient:   Script for Norco 5/325 medication for 20 tabs and 0 refills given to patient at dischage to have them fill at their out patient pharmacy  Medication: norco , 25 tabs given to patient at the time of discharge to take home     ROS:    10 point ROS of systems including Constitutional, Eyes, Respiratory, Cardiovascular, Gastroenterology, Genitourinary, Integumentary, Musculoskeletal, Psychiatric were all negative except for pertinent positives noted in my HPI.    Physical Exam:   Vitals: /77  Pulse 62  Temp 98  F (36.7  C)  Resp 18  Wt 193 lb 12.8 oz (87.9 kg)  SpO2 93%  BMI 34.33 kg/m2  BMI= Body mass index is 34.33 kg/(m^2).    GENERAL APPEARANCE:  Alert, in no distress, oriented, cooperative  RESP:  respiratory effort and palpation of chest normal, lungs clear to auscultation , no respiratory distress  CV:  Palpation and auscultation of heart done , regular rate and rhythm, no murmur, rub, or gallop, +2 pedal pulses, peripheral edema 1-2+ in BLE  ABDOMEN:  normal bowel sounds, soft, nontender, no hepatosplenomegaly or other masses, no guarding or rebound  M/S:   left sided weakness, no joint tenderness, stopped gait, decrease ROM to lumbar spine  SKIN:  Inspection of skin and subcutaneous tissue baseline  NEURO:   Cranial nerves 2-12 are normal tested and grossly at patient's baseline, seroma to right groin decreasing in size, soft  PSYCH:  oriented X 3, normal insight, judgement and memory, affect and mood normal    DISCHARGE PLAN:  Occupational Therapy and Physical Therapy  Patient instructed to follow-up with:  PCP in 7 days      Current [place] scheduled appointments:  No future appointments.    MTM referral needed and placed by this provider: No    Pending labs: None    SNF labs   CBC RESULTS:   Recent Labs   Lab Test  11/23/18   1042   11/14/18   0802   WBC  9.1  7.6   RBC  4.05  3.70*   HGB  11.1*  9.8*   HCT  36.8  32.9*   MCV  91  89   MCH  27.4  26.5   MCHC  30.2*  29.8*   RDW  17.0*  16.8*   PLT  202  193       Last Basic Metabolic Panel:  Recent Labs   Lab Test  12/03/18   0755  11/28/18   0740   NA  140  141   POTASSIUM  4.4  5.1   CHLORIDE  105  103   BLAIR  9.0  9.1   CO2  30  31   BUN  58*  51*   CR  1.68*  1.68*   GLC  91  94       Liver Function Studies -   Recent Labs   Lab Test  06/25/18   1020   ALBUMIN  3.5       TSH   Date Value Ref Range Status   08/14/2017 0.62 0.40 - 4.00 mU/L Final   04/18/2007 0.60 0.4 - 5.0 mU/L Final       Lab Results   Component Value Date    A1C 6.2 08/31/2017           Discharge Treatments:  F/u with PCP in 7-10 days  Surgery L1-2 decompression with Dr. Morris once receives insurance authorization  Compression stockings to BLE  Daily weights  Medications as above.     Orders:  1. discontinue Protonix  2. Start Omeprazole 40 mg PO Q AM beore breakfast Dx: GERD        TOTAL DISCHARGE TIME:   Greater than 30 minutes  Electronically signed by:  MARY Gómez CNP         Documentation of Face-to-Face and Certification for Home Health Services     Patient: Jazmin Clifton   YOB: 1932  MR Number: [id number]  Today's Date: 12/10/2018    I certify that patient: Jazmin Clifton is under my care and that I, or a nurse practitioner or physician's assistant working with me, had a face-to-face encounter that meets the physician face-to-face encounter requirements with this patient on: 12/7/2018.    This encounter with the patient was in whole, or in part, for the following medical condition, which is the primary reason for home health care: lumbar stenosis, physical deconditioning, lymph edema.    I certify that, based on my findings, the following services are medically necessary home health services: Occupational Therapy and Physical Therapy.    My clinical findings support the need for the  above services because: Occupational Therapy Services are needed to assess and treat cognitive ability and address ADL safety due to impairment in needs to use adaptive equipment d/t decreased mobility r/t spinal stenosi. and Physical Therapy Services are needed to assess and treat the following functional impairments: needs continued rehab d/t lumbar spinal stenosis.    Further, I certify that my clinical findings support that this patient is homebound (i.e. absences from home require considerable and taxing effort and are for medical reasons or [facility] services or infrequently or of short duration when for other reasons) because: Requires assistance of another person or specialized equipment to access medical services because patient: Is unable to walk greater than 250 feet without rest. and Requires supervision of another for safe transfer...    Based on the above findings. I certify that this patient is confined to the home and needs intermittent skilled nursing care, physical therapy and/or speech therapy.  The patient is under my care, and I have initiated the establishment of the plan of care.  This patient will be followed by a physician who will periodically review the plan of care.  Physician/Provider to provide follow up care: Roby Willis    Responsible Medicare certified PECOS Physician: Dr. Ana Cristina Yu  Physician Signature: See electronic signature associated with these discharge orders.  Date: 12/10/2018    Electronically signed by Dr. Ana Cristina Yu MD, and only signing for initial order. Please send all follow up questions and concerns or needed follow up signatures to the PCP Roby Willis.

## 2018-12-07 NOTE — LETTER
12/7/2018        RE: [facility]  [address]  [facility] [address]          [place] [facility] DISCHARGE SUMMARY    PATIENT'S NAME: Jazmin Clifton  YOB: 1932  MEDICAL RECORD NUMBER:  [id number]  Place of Service where encounter took place:  [facility] (SNF) [[id number]]    PRIMARY CARE PROVIDER AND CLINIC RESPONSIBLE AFTER TRANSFER: Roby Willis PHYSICIAN [facility] [address] [facility] [place] [facility]    TRANSFERRING PROVIDERS: MARY Gómez CNP, Ana Cristina Yu MD  DATE OF SNF ADMISSION:  November / 06 / 2018  DATE OF SNF (anticipated) DISCHARGE: December / 11 / 2018  DISCHARGE DISPOSITION: Assisted Living: other   RECENT HOSPITALIZATION/ED:  [facility] stay 10/29/18 to 11/6/18.     CODE STATUS/ADVANCE DIRECTIVES DISCUSSION:   CPR/Full code      Allergies   Allergen Reactions     Accupril      Ace Inhibitors Unknown     Accupril     Augmentin Unknown     Levofloxacin Unknown     Macrobid [Nitrofurantoin]      Morphine Other (See Comments)     hallucinations     Norvasc [Amlodipine Besylate]      Leg swelling       Quinapril      Condition on Discharge:  Improving.  Function:  Independent with 2WW with transfers and ambulation, personal hygiene, toileting, bed mobility, and upper body. Ext assist of one with lower body and TG shapes.  Cognitive Scores: SLUMS 26/30 and Safety 21/22    Equipment: walker    DISCHARGE DIAGNOSIS:   1. Spinal stenosis of lumbar region, unspecified whether neurogenic claudication present    2. Physical deconditioning    3. CKD (chronic kidney disease) stage 3, GFR 30-59 ml/min (H)    4. Hyperkalemia    5. Congestive heart failure, unspecified HF chronicity, unspecified heart failure type (H)    6. Coronary artery disease involving native coronary artery of native heart without angina pectoris    7. Benign essential hypertension    8. Mass of right inguinal region    9.  Nausea    10. Slow transit constipation    11. Chronic obstructive pulmonary disease, unspecified COPD type (H)    12. Depression with anxiety    13. H/O: CVA (cerebrovascular accident)    14. Gastroesophageal reflux disease without esophagitis    15. Dementia without behavioral disturbance, unspecified dementia type    16. Osteoporosis with current pathological fracture with routine healing, unspecified osteoporosis type, subsequent encounter        HPI Nursing Facility Course:  HPI information obtained from: facility chart records, facility staff, patient report and [facility] chart review.    Jazmin Clifton is a 85 y.o. Female with PMH of CKD, CHF, COPD, previous laminectomy, dementia and falls with left metatarsal fracture who was admitted 10/30/18 with back pain and found to have acute on chronic kidney disease which improved with IV fluids. MRI lumbar spine revealed moderate to marked canal stenosis at L1-L2 and spine was consulted, plan for outpatient surgery in the next week. She was given course of medrol. Recent UTI but UC done while IP was negative. Neurosurgery recommended starting zinc and vitamin C. Right renal cyst found on CT scan, f/u neuro imaging recommended in 3 months. Creatinine improved to baseline, PVR's checked, highest was 112. ACE and spironolactone held while IP, but resumed on discharge. Did  have some nausea while IP, was given zofran with improvement. When medically stable she was discharged to TCU for rehab and medical management with plan to f/u with neurosurgery on 11/9, likely surgery next week.     Spinal stenosis of lumbar region, unspecified whether neurogenic claudication present  Physical deconditioning  Planned for L1-2 decompression with Dr. Morris, however she developed large right groin seroma, so neurosurgery delayed surgery until cleared by general surgery and she had her vascular surgery apt. Initially had issues with significant pain and asked to be changed from norco  to oxycodone as she felt she had better pain control on oxycodone. However, she developed nausea and reports in general she did not feel well, and was changed back to Norco with improvement. Does have some break through pain, but has been well managed on Norco 1 tab TID and q4h PRN, gabapentin 100mg q AM, 200mg at bedtime, Biofreeze TID, cyclobenzaprine TID PRN, . She remains on methylprednisolone 4mg daily, was started on zinc and vitamin C supplement per neurosurgery. She has reached goals with PT and OT. She is currently wait from prior authorization from insurance company before surgery can be scheduled, so she is discharging back to her LTC facility.     CKD (chronic kidney disease) stage 3, GFR 30-59 ml/min (H)  Hyperkalemia  K+ of 5.8 on 11/23, was given 15mg of kayexalate and lisinopril discontinued. BMP drawn again on 11/26 and Had K+ of 6.2, she was given 30gm of kayexalate, spironolactone discontinued and furosemide increased to 40mg qAM and 20mg at bedtime. Placed on a low potassium diet. Recheck of K+ WNL, Creatinine has remained slightly above baseline at 1.68 (baseline ~1.1-1.3).     Congestive heart failure, unspecified HF chronicity, unspecified heart failure type (H)  Coronary artery disease involving native coronary artery of native heart without angina pectoris  Benign essential hypertension  Spironolactone and lisinopril discontinued d/t hyperkalemia, lasix increased to 40mg q AM and 20mg at bedtime. Weight remained stable 190-193lbs, BP stable. She was seen vascular surgery, who recommended compression stockings, gave ok for neurosurgery. She is on ASA 81mg daiy, atorvastatin, imdur 30mg daily, isradipine 5mg daily,     Mass of right inguinal region  Found to large mass to right groin on 11/13/18, confirmed to be seroma via ultrasound. She was referred to general surgery, felt likely related from previous cyst removal surgery which did have some lymph node involvement. He recommended wearing  Spanks to appl compression to the site. She did wear these briefly in TCU, however she found them to be too uncomfortable and tight to wear, so she stopped wearing them. Seroma improving prior to TCU discharge     Nausea  Had intermittent issues with nausea in TCU, suspect r/t pain and and narcotics. Improved after she was changed back to norco, no recent nausea by time of discharge. Does PRN Zofran available.     Slow transit constipation  Having regular bowel movements on miralax 17gm daily, senna-s 1 tab BID and 1 tab PRN daily.     Chronic obstructive pulmonary disease, unspecified COPD type (H)  No concerns of exacerbation in TCU, stable on Breo Ellipta 1 puff daily, xopenex PRN, and Anoro Ellipta 1 puff daily.     Depression with anxiety  Stable on duloxetine 30mg daily, if kidney function remains elevated consider GDR or alternate therapy    H/O: CVA (cerebrovascular accident)  Chronic left sided weakness, at baseline. On statin, ASA    Gastroesophageal reflux disease without esophagitis  Had difficulty swallowing pills and reported some heartburn despite being on Protonix 40 mg daily, Discussed with pharmacy, they recommended changing to omeprazole 40mg daily as might be more effective, this was done prior to discharge. SLP followed to work on swallowing techniques.     Dementia without behavioral disturbance, unspecified dementia type  Fair historian, no behavioral issues, remains on Namenda 5mg daily and and donepezil 5mg daily.     Osteoporosis with current pathological fracture with routine healing, unspecified osteoporosis type, subsequent encounter        PAST MEDICAL HISTORY:  has a past medical history of ABDOMINAL PAIN GENERALIZED (3/15/2006); Abdominal pain, generalized (3/15/2006); Atherosclerosis of renal artery (H); BENIGN HYPERTENSION (5/1/2006); Benign neoplasm of scalp and skin of neck; Cerebral aneurysm, nonruptured; Depressive disorder, not elsewhere classified; Esophageal reflux; Female  stress incontinence; Gastrointestinal malfunction arising from mental factors; Generalized osteoarthrosis, unspecified site; Herpes zoster dermatitis of eyelid; Insomnia, unspecified; Lumbago; Other chest pain; Other specified cardiac dysrhythmias(427.89); Rectocele; Unspecified disorder of kidney and ureter; and Unspecified essential hypertension.    DISCHARGE MEDICATIONS:  Current Outpatient Prescriptions   Medication Sig Dispense Refill     Ascorbic Acid (VITAMIN C PO) Take 500 mg by mouth daily       aspirin 81 MG chewable tablet Take 81 mg by mouth       atorvastatin (LIPITOR) 40 MG tablet TAKE 1 TAB BY MOUTH AT BEDTIME FOR HIGH CHOLESTEROL 30 tablet 3     B COMPLEX-C-E-ZN PO Take 1 tablet by mouth daily       CALCIUM 600+D 600-200 MG-UNIT TABS TAKE 1 TAB BY MOUTH ONCE DAILY 30 tablet 11     calcium carbonate (TUMS) 500 MG chewable tablet Take 1 chew tab by mouth every hour as needed for heartburn       CYCLOBENZAPRINE HCL PO Take 5 mg by mouth every 8 hours as needed for muscle spasms        DONEPEZIL HCL PO Take 5 mg by mouth daily       DULoxetine HCl (CYMBALTA PO) Take 30 mg by mouth daily       ESTRIOL 1MG/GRAM CREAM Apply 1 g topically See Admin Instructions Insert 1 application vaginally at bedtime every Mon, Wed, Fri for vaginal dryness, burning, itching apply pea size amount vaginally at bedtime three times a week       fluticasone (FLONASE) 50 MCG/ACT spray Spray 1 spray into both nostrils 2 times daily as needed        fluticasone-vilanterol (BREO ELLIPTA) 100-25 MCG/INH oral inhaler Inhale 1 puff into the lungs daily       Furosemide (LASIX PO) Take 40 mg by mouth daily And 20mg q afternoon.       FUROSEMIDE PO Take 20 mg by mouth daily as needed for weight gain daily for >2 pound weight gain in one day       GABAPENTIN PO Take 100 mg by mouth every morning       GABAPENTIN PO Take 200 mg by mouth At Bedtime        guaiFENesin (ROBITUSSIN) 100 MG/5ML SYRP Take 10 mLs by mouth every 4 hours as needed  for cough        HYDROcodone-acetaminophen (NORCO) 5-325 MG tablet Take 1 tablet by mouth 3 times daily       HYDROcodone-acetaminophen (NORCO) 5-325 MG tablet Take 1 tablet by mouth every 4 hours as needed for severe pain       hydrocortisone 1 % CREA cream Apply topically every 6 hours as needed for itching       isosorbide mononitrate (IMDUR) 30 MG 24 hr tablet TAKE 1 TAB BY MOUTH ONCE DAILY FOR HYPERTENSION/HIGH BLOOD PRESSURE 30 tablet 3     isradipine (DYNACIRC) 5 MG capsule Take 5 mg by mouth 2 times daily       levalbuterol (XOPENEX) 1.25 MG/3ML neb solution Take 1 ampule by nebulization every 4 hours as needed for shortness of breath / dyspnea or wheezing       loperamide (IMODIUM) 2 MG capsule Take 2 mg by mouth 4 times daily as needed for diarrhea       magnesium hydroxide (MILK OF MAGNESIA) 400 MG/5ML suspension Take 30 mLs by mouth daily as needed for constipation or heartburn       MAGNESIUM OXIDE PO Take 250 mg by mouth daily       memantine (NAMENDA) 5 MG tablet TAKE 1 TAB BY MOUTH ONCE DAILY FOR DEMENTIA 30 tablet 3     Menthol, Topical Analgesic, (BIOFREEZE) 4 % GEL Externally apply topically daily And Three times a day PRN       methylPREDNISolone (MEDROL DOSEPAK) 4 MG tablet Take 4 mg by mouth daily follow package directions       nystatin (MYCOSTATIN) 117281 UNIT/GM POWD Apply topically 2 times daily as needed        Ondansetron (ZOFRAN ODT PO) Take 4 mg by mouth every 8 hours as needed for nausea       polyethylene glycol (MIRALAX/GLYCOLAX) Packet Take 17 g by mouth daily        polyvinyl alcohol (LIQUIFILM TEARS) 1.4 % ophthalmic solution Place 1 drop into both eyes 2 times daily And 4 times a day PRN       senna-docusate (SENOKOT-S;PERICOLACE) 8.6-50 MG per tablet Take 1 tablet by mouth 2 times daily And once daily PRN       SIMETHICONE-80 PO Take 160 mg by mouth 4 times daily as needed for intestinal gas        umeclidinium-vilanterol (ANORO ELLIPTA) 62.5-25 MCG/INH oral inhaler Inhale 1  puff into the lungs daily       Zinc 30 MG CAPS Take 30 mg by mouth daily         MEDICATION CHANGES/RATIONALE:   Fosamax on hold while in TCU, resume on discharge. Remains on calcium, vitamin D supplement.     Controlled medications sent with patient:   Script for Norco 5/325 medication for 20 tabs and 0 refills given to patient at dischage to have them fill at their out patient pharmacy  Medication: norco , 25 tabs given to patient at the time of discharge to take home     ROS:    10 point ROS of systems including Constitutional, Eyes, Respiratory, Cardiovascular, Gastroenterology, Genitourinary, Integumentary, Musculoskeletal, Psychiatric were all negative except for pertinent positives noted in my HPI.    Physical Exam:   Vitals: /77  Pulse 62  Temp 98  F (36.7  C)  Resp 18  Wt 193 lb 12.8 oz (87.9 kg)  SpO2 93%  BMI 34.33 kg/m2  BMI= Body mass index is 34.33 kg/(m^2).    GENERAL APPEARANCE:  Alert, in no distress, oriented, cooperative  RESP:  respiratory effort and palpation of chest normal, lungs clear to auscultation , no respiratory distress  CV:  Palpation and auscultation of heart done , regular rate and rhythm, no murmur, rub, or gallop, +2 pedal pulses, peripheral edema 1-2+ in BLE  ABDOMEN:  normal bowel sounds, soft, nontender, no hepatosplenomegaly or other masses, no guarding or rebound  M/S:   left sided weakness, no joint tenderness, stopped gait, decrease ROM to lumbar spine  SKIN:  Inspection of skin and subcutaneous tissue baseline  NEURO:   Cranial nerves 2-12 are normal tested and grossly at patient's baseline, seroma to right groin decreasing in size, soft  PSYCH:  oriented X 3, normal insight, judgement and memory, affect and mood normal    DISCHARGE PLAN:  Occupational Therapy and Physical Therapy  Patient instructed to follow-up with:  PCP in 7 days      Current [place] scheduled appointments:  No future appointments.    MTM referral needed and placed by this provider:  No    Pending labs: None    SNF labs   CBC RESULTS:   Recent Labs   Lab Test  11/23/18   1042  11/14/18   0802   WBC  9.1  7.6   RBC  4.05  3.70*   HGB  11.1*  9.8*   HCT  36.8  32.9*   MCV  91  89   MCH  27.4  26.5   MCHC  30.2*  29.8*   RDW  17.0*  16.8*   PLT  202  193       Last Basic Metabolic Panel:  Recent Labs   Lab Test  12/03/18   0755  11/28/18   0740   NA  140  141   POTASSIUM  4.4  5.1   CHLORIDE  105  103   BLAIR  9.0  9.1   CO2  30  31   BUN  58*  51*   CR  1.68*  1.68*   GLC  91  94       Liver Function Studies -   Recent Labs   Lab Test  06/25/18   1020   ALBUMIN  3.5       TSH   Date Value Ref Range Status   08/14/2017 0.62 0.40 - 4.00 mU/L Final   04/18/2007 0.60 0.4 - 5.0 mU/L Final       Lab Results   Component Value Date    A1C 6.2 08/31/2017           Discharge Treatments:  F/u with PCP in 7-10 days  Surgery L1-2 decompression with Dr. Morris once receives insurance authorization  Compression stockings to BLE  Daily weights  Medications as above.     Orders:  1. discontinue Protonix  2. Start Omeprazole 40 mg PO Q AM beore breakfast Dx: GERD        TOTAL DISCHARGE TIME:   Greater than 30 minutes  Electronically signed by:  MARY Gómez CNP         Documentation of Face-to-Face and Certification for Home Health Services     Patient: Jazmin Clifton   YOB: 1932  MR Number: [id number]  Today's Date: 12/10/2018    I certify that patient: Jazmin Clifton is under my care and that I, or a nurse practitioner or physician's assistant working with me, had a face-to-face encounter that meets the physician face-to-face encounter requirements with this patient on: 12/7/2018.    This encounter with the patient was in whole, or in part, for the following medical condition, which is the primary reason for home health care: lumbar stenosis, physical deconditioning, lymph edema.    I certify that, based on my findings, the following services are medically necessary home health services:  Occupational Therapy and Physical Therapy.    My clinical findings support the need for the above services because: Occupational Therapy Services are needed to assess and treat cognitive ability and address ADL safety due to impairment in needs to use adaptive equipment d/t decreased mobility r/t spinal stenosi. and Physical Therapy Services are needed to assess and treat the following functional impairments: needs continued rehab d/t lumbar spinal stenosis.    Further, I certify that my clinical findings support that this patient is homebound (i.e. absences from home require considerable and taxing effort and are for medical reasons or [employer] services or infrequently or of short duration when for other reasons) because: Requires assistance of another person or specialized equipment to access medical services because patient: Is unable to walk greater than 250 feet without rest. and Requires supervision of another for safe transfer...    Based on the above findings. I certify that this patient is confined to the home and needs intermittent skilled nursing care, physical therapy and/or speech therapy.  The patient is under my care, and I have initiated the establishment of the plan of care.  This patient will be followed by a physician who will periodically review the plan of care.  Physician/Provider to provide follow up care: Roby Willis    Responsible Medicare certified PECOS Physician: Dr. Ana Cristina Yu  Physician Signature: See electronic signature associated with these discharge orders.  Date: 12/10/2018    Electronically signed by Dr. Ana Cristina Yu MD, and only signing for initial order. Please send all follow up questions and concerns or needed follow up signatures to the PCP Roby Willis.        Sincerely,        MARY Gómez CNP

## 2018-12-11 ENCOUNTER — HOSPITAL LABORATORY (OUTPATIENT)
Facility: OTHER | Age: 83
End: 2018-12-11

## 2018-12-11 LAB
ANION GAP SERPL CALCULATED.3IONS-SCNC: 3 MMOL/L (ref 3–14)
BUN SERPL-MCNC: 38 MG/DL (ref 7–30)
CALCIUM SERPL-MCNC: 9 MG/DL (ref 8.5–10.1)
CHLORIDE SERPL-SCNC: 105 MMOL/L (ref 94–109)
CO2 SERPL-SCNC: 33 MMOL/L (ref 20–32)
CREAT SERPL-MCNC: 1.31 MG/DL (ref 0.52–1.04)
GFR SERPL CREATININE-BSD FRML MDRD: 39 ML/MIN/1.7M2
GLUCOSE SERPL-MCNC: 81 MG/DL (ref 70–99)
POTASSIUM SERPL-SCNC: 4.3 MMOL/L (ref 3.4–5.3)
SODIUM SERPL-SCNC: 141 MMOL/L (ref 133–144)

## 2018-12-14 ENCOUNTER — RECORDS - HEALTHEAST (OUTPATIENT)
Dept: LAB | Facility: CLINIC | Age: 83
End: 2018-12-14

## 2018-12-14 LAB
ANION GAP SERPL CALCULATED.3IONS-SCNC: 9 MMOL/L (ref 5–18)
BUN SERPL-MCNC: 33 MG/DL (ref 8–28)
CALCIUM SERPL-MCNC: 9.9 MG/DL (ref 8.5–10.5)
CHLORIDE BLD-SCNC: 105 MMOL/L (ref 98–107)
CO2 SERPL-SCNC: 30 MMOL/L (ref 22–31)
CREAT SERPL-MCNC: 1.15 MG/DL (ref 0.6–1.1)
GFR SERPL CREATININE-BSD FRML MDRD: 45 ML/MIN/1.73M2
GLUCOSE BLD-MCNC: 129 MG/DL (ref 70–125)
POTASSIUM BLD-SCNC: 4.1 MMOL/L (ref 3.5–5)
SODIUM SERPL-SCNC: 144 MMOL/L (ref 136–145)

## 2018-12-28 ENCOUNTER — ASSISTED LIVING VISIT (OUTPATIENT)
Dept: GERIATRICS | Facility: CLINIC | Age: 83
End: 2018-12-28
Payer: COMMERCIAL

## 2018-12-28 VITALS
WEIGHT: 194.4 LBS | BODY MASS INDEX: 34.44 KG/M2 | RESPIRATION RATE: 18 BRPM | OXYGEN SATURATION: 93 % | SYSTOLIC BLOOD PRESSURE: 138 MMHG | DIASTOLIC BLOOD PRESSURE: 92 MMHG | TEMPERATURE: 98 F | HEART RATE: 66 BPM

## 2018-12-28 DIAGNOSIS — J44.9 CHRONIC OBSTRUCTIVE PULMONARY DISEASE, UNSPECIFIED COPD TYPE (H): ICD-10-CM

## 2018-12-28 DIAGNOSIS — I10 BENIGN ESSENTIAL HYPERTENSION: ICD-10-CM

## 2018-12-28 DIAGNOSIS — K21.9 GASTROESOPHAGEAL REFLUX DISEASE WITHOUT ESOPHAGITIS: ICD-10-CM

## 2018-12-28 DIAGNOSIS — F03.90 DEMENTIA WITHOUT BEHAVIORAL DISTURBANCE, UNSPECIFIED DEMENTIA TYPE: ICD-10-CM

## 2018-12-28 DIAGNOSIS — N18.30 CKD (CHRONIC KIDNEY DISEASE) STAGE 3, GFR 30-59 ML/MIN (H): ICD-10-CM

## 2018-12-28 DIAGNOSIS — I50.9 CONGESTIVE HEART FAILURE, UNSPECIFIED HF CHRONICITY, UNSPECIFIED HEART FAILURE TYPE (H): ICD-10-CM

## 2018-12-28 DIAGNOSIS — Z86.73 H/O: CVA (CEREBROVASCULAR ACCIDENT): ICD-10-CM

## 2018-12-28 DIAGNOSIS — M48.061 SPINAL STENOSIS OF LUMBAR REGION, UNSPECIFIED WHETHER NEUROGENIC CLAUDICATION PRESENT: Primary | ICD-10-CM

## 2018-12-28 RX ORDER — ACETAMINOPHEN 325 MG/1
650 TABLET ORAL EVERY 4 HOURS PRN
Status: ON HOLD | COMMUNITY
End: 2019-02-22

## 2018-12-28 NOTE — PROGRESS NOTES
[facility]  PRIMARY CARE PROVIDER AND CLINIC:  Junie Estrella [address] [address] / [facility] [id number]  Chief Complaint   Patient presents with     Hospital F/U     Pre-Op Exam     [place] [facility] Record Number:  [id number]  Place of Service where encounter took place:  [facility] ([facility]) [[id number]]    HPI:    Jazmin Clifton is a 85 year old  (12/30/1932),admitted to the above facility from [facility].  Admitted to this facility for  {[facility]GS ADMISSION REASONS:712372}.  HPI information obtained from: facility chart records, facility staff, patient report and [facility] chart review.      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  PAST SURGICAL HISTORY:  has a past surgical history that includes surgical history of - ; surgical history of - ; surgical history of -  (1996); surgical history of - ; surgical history of - ; cholecystectomy, laporoscopic (1997); hysterectomy, mirtha  (1982); and Endarterectomy carotid (Right, 8/31/2017).  FAMILY HISTORY: family history includes Asthma in her son; Cancer in her brother and mother; Cerebrovascular Disease in her father; Diabetes in her son; Eye Disorder in her son; Gastrointestinal Disease in her son; Heart Disease in her father.  SOCIAL HISTORY:  reports that she has quit smoking. She does not have any smokeless tobacco history on file. She reports that she drinks alcohol. She reports that she does not use drugs.    Post Discharge Medication Reconciliation Status: {[facility] Med Rec (Provider):961813}.  Current Outpatient Medications   Medication Sig Dispense Refill     acetaminophen (TYLENOL) 325 MG tablet Take 650 mg by mouth every 4 hours as needed for mild pain       Alendronate Sodium (FOSAMAX PO) Take 10 mg by mouth       Ascorbic Acid (VITAMIN C PO) Take 500 mg by mouth daily       aspirin 81 MG chewable tablet Take 81 mg by mouth       atorvastatin (LIPITOR) 40 MG tablet TAKE 1 TAB BY MOUTH AT BEDTIME FOR HIGH CHOLESTEROL 30 tablet 3     B COMPLEX-C-E-ZN PO Take 1 tablet by mouth daily       CALCIUM 600+D 600-200 MG-UNIT TABS TAKE 1 TAB BY MOUTH ONCE DAILY 30 tablet 11     calcium carbonate (TUMS) 500 MG chewable tablet Take 1 chew tab by mouth every hour as needed for heartburn       CYCLOBENZAPRINE HCL PO Take 5 mg by mouth every 8 hours as needed for muscle spasms        DONEPEZIL HCL PO Take 5 mg by mouth daily       DULoxetine HCl (CYMBALTA PO) Take 30 mg by mouth daily       fluticasone (FLONASE) 50 MCG/ACT spray Spray 1 spray into both nostrils 2 times daily as needed        fluticasone-vilanterol (BREO ELLIPTA) 100-25 MCG/INH oral inhaler Inhale 1 puff into the lungs daily       Furosemide (LASIX PO) Take 40 mg by mouth daily And 20mg q afternoon.       FUROSEMIDE PO Take 20 mg by mouth daily as needed for weight gain daily for >2 pound weight gain in one day       GABAPENTIN PO Take 100 mg by mouth every morning       GABAPENTIN PO  Take 200 mg by mouth At Bedtime        guaiFENesin (ROBITUSSIN) 100 MG/5ML SYRP Take 10 mLs by mouth every 4 hours as needed for cough        HYDROcodone-acetaminophen (NORCO) 5-325 MG tablet Take 1 tablet by mouth 3 times daily       HYDROcodone-acetaminophen (NORCO) 5-325 MG tablet Take 1 tablet by mouth every 4 hours as needed for severe pain       hydrocortisone 1 % CREA cream Apply topically every 6 hours as needed for itching       isosorbide mononitrate (IMDUR) 30 MG 24 hr tablet TAKE 1 TAB BY MOUTH ONCE DAILY FOR HYPERTENSION/HIGH BLOOD PRESSURE 30 tablet 3     isradipine (DYNACIRC) 5 MG capsule Take 5 mg by mouth 2 times daily       levalbuterol (XOPENEX) 1.25 MG/3ML neb solution Take 1 ampule by nebulization every 4 hours as needed for shortness of breath / dyspnea or wheezing       loperamide (IMODIUM) 2 MG capsule Take 2 mg by mouth 4 times daily as needed for diarrhea       magnesium hydroxide (MILK OF MAGNESIA) 400 MG/5ML suspension Take 30 mLs by mouth daily as needed for constipation or heartburn       MAGNESIUM OXIDE PO Take 250 mg by mouth daily       memantine (NAMENDA) 5 MG tablet TAKE 1 TAB BY MOUTH ONCE DAILY FOR DEMENTIA 30 tablet 3     Menthol, Topical Analgesic, (BIOFREEZE) 4 % GEL Externally apply topically daily And Three times a day PRN       methylPREDNISolone (MEDROL DOSEPAK) 4 MG tablet Take 4 mg by mouth daily follow package directions       nystatin (MYCOSTATIN) 561054 UNIT/GM POWD Apply topically 2 times daily as needed        OMEPRAZOLE PO Take 40 mg by mouth every morning       Ondansetron (ZOFRAN ODT PO) Take 4 mg by mouth every 8 hours as needed for nausea       polyethylene glycol (MIRALAX/GLYCOLAX) Packet Take 17 g by mouth daily        polyvinyl alcohol (LIQUIFILM TEARS) 1.4 % ophthalmic solution Place 1 drop into both eyes 2 times daily And 4 times a day PRN       senna-docusate (SENOKOT-S;PERICOLACE) 8.6-50 MG per tablet Take 1 tablet by mouth 2 times daily And once daily  PRN       SIMETHICONE-80 PO Take 160 mg by mouth 4 times daily as needed for intestinal gas        Zinc 30 MG CAPS Take 30 mg by mouth daily         ROS:  {ROS FGS:056871}    Exam:  BP (!) 128/92   Pulse 66   Temp 98  F (36.7  C)   Resp 18   Wt 88.2 kg (194 lb 6.4 oz)   SpO2 93%   BMI 34.44 kg/m    {Nursing home physical exam :416998}    Lab/Diagnostic data:    CBC RESULTS:   Recent Labs   Lab Test 11/23/18  1042 11/14/18  0802   WBC 9.1 7.6   RBC 4.05 3.70*   HGB 11.1* 9.8*   HCT 36.8 32.9*   MCV 91 89   MCH 27.4 26.5   MCHC 30.2* 29.8*   RDW 17.0* 16.8*    193       Last Basic Metabolic Panel:  Recent Labs   Lab Test 12/11/18  0805 12/03/18  0755    140   POTASSIUM 4.3 4.4   CHLORIDE 105 105   BLAIR 9.0 9.0   CO2 33* 30   BUN 38* 58*   CR 1.31* 1.68*   GLC 81 91       Liver Function Studies -   Recent Labs   Lab Test 06/25/18  1020   ALBUMIN 3.5       TSH   Date Value Ref Range Status   08/14/2017 0.62 0.40 - 4.00 mU/L Final   04/18/2007 0.60 0.4 - 5.0 mU/L Final       Lab Results   Component Value Date    A1C 6.2 08/31/2017       ASSESSMENT/PLAN:  {FGS DX INITIAL:640690}    Orders:  1. discontinue Cyclobenzaprine, Calmoseptine, cough lozenges  2. discontinue Anoro elipta inhaler  3. discontinue spironolactone  4. BMP, Hgb on next lab day   5. Change miralax to 17 gm PO every day PRN   6. DC zinc   7. EKG re CHF, Pre op clearance  8. Hold Aspirin starting 12/28/18  9. On morning of surgery hold senna, calcium, vitamin B, Vitamin C      {FGS TIME SPENT:150837}    Electronically signed by:  Richmond Hidalgo

## 2018-12-28 NOTE — LETTER
2018        RE: Jazmin M Clifton  [facility]  [address]  [place] [address]            PRE-OP EVALUATION:    [facility] Record Number:  [id number]  Place of Service where encounter took place:  [facility] [[id number]]  Today's date: 2018    [facility]  [address]  [facility] [address]  [facility] [address]  [phone]  Dept: [phone]    PRE-OP EVALUATION:  Today's date: 2018    Jazmin Clifton (: 1932) presents for pre-operative evaluation assessment as requested by Dr. Morris.  She requires evaluation and anesthesia risk assessment prior to undergoing surgery/procedure for treatment of Lumbar spinal stenosis .    Proposed Surgery/ Procedure: L1-2 decompression  Date of Surgery/ Procedure: 2019  Time of Surgery/ Procedure: unknown  Hospital/Surgical Facility: unknown  Primary Physician: Junie Estrella  Type of Anesthesia Anticipated: General    Patient has a Health Care Directive or Living Will:  YES full code    1. YES - DO YOU HAVE A HISTORY OF HEART ATTACK, STROKE, STENT, BYPASS OR SURGERY ON AN ARTERY IN THE HEAD, NECK, HEART OR LEG? H/o CVA with MCA stent  2. NO - Do you ever have any pain or discomfort in your chest?  3. YES - DO YOU HAVE A HISTORY OF HEART FAILURE H/o CHF with Echo 2017 showing EF of 60-65%  4. YES - ARE YOUR TROUBLED BY SHORTNESS OF BREATH WHEN WALKING ON THE LEVEL, UP A SLIGHT HILL OR AT NIGHT? H/o COPD. CHF, SOB when walking up a hill or long distances  5. NO - Do you currently have a cold, bronchitis or other respiratory infection?  6. YES - DO YOU HAVE A COUGH, SHORTNESS OF BREATH OR WHEEZING? Chronic occasional cough d/t COPD, at baseline  7. NO - Do you sometimes get pains in the calves of your legs when you walk?  8. NO - Do you or anyone in your family have previous history of blood clots?  9. YES - DO YOU OR DOES ANYONE IN YOUR FAMILY HAVE A SERIOUS BLEEDING PROBLEM SUCH  AS PROLONGED BLEEDING FOLLOWING SURGERIES OR CUTS? Mother had bleeding problems, patient denies any h/o clots or inappropriate bleeding  10. YES - HAVE YOU EVER HAD PROBLEMS WITH ANEMIA OR BEEN TOLD TO TAKE IRON PILLS? On vitamin B supplement for chronic anemia.   11. NO - Have you had any abnormal blood loss such as black, tarry or bloody stools, or abnormal vaginal bleeding?  12. NO - Have you ever had a blood transfusion?  13. NO - Have you or any of your relatives ever had problems with anesthesia?  14. NO - Do you have sleep apnea, excessive snoring or daytime drowsiness?  15. NO - Do you have any prosthetic heart valves?  16. YES - DO YOU HAVE PROSTHETIC JOINTS? Left hip joint prosthesis  17. NO - Is there any chance that you may be pregnant?      HPI:     HPI related to upcoming procedure:   Jazmin Clifton is a 85 y.o. Female with PMH of CKD, CHF, COPD, previous laminectomy, dementia and falls with left metatarsal fracture who was admitted to the hospital on 10/30/18 with back pain . MRI lumbar spine revealed moderate to marked canal stenosis at L1-L2 and neurosurgery was consulted. She was started on medrol, zinc and vitamin C. Neurosurgery recommended L1-2 decompression. Surgery was planned for early November, however was delayed and patient developed seroma and neurosurgery wanted clearance from vascular surgery prior to lumbar decompression. She was cleared for surgery in early December, however then need prior authorization prior to undergoing surgery, which is now scheduled for 1/9/2018.    ANEMIA - Patient has a recent history of moderate-severe anemia, which has not been symptomatic - has been chronic and stable. Work up to date has revealed Hgb 11.1, chronic and. Treatment has been Vitmain B supplementation, .                                                                                                                                                                                                                                                                                           .  CHF - Patient has a longstanding history of moderate-severe CHF. Exacerbating conditions include hypertension and COPD. Currently the patient's condition is same. Current treatment regimen includes long acting nitrate, calcium channel blocker, ASA, diuretic (ACE-inhibitor and spironolactone discontinued d/t hyperkalemia). The patient denies chest pain, edema, orthopnea, SOB or recent weight gain. Last Echocardiogram 7/2017; EF 60-65%, normal LVH, EKG NSR, no changed from previous EKG.                                                                                                                                                                               .  COPD - Patient has a longstanding history of moderate-severe COPD . Patient has been doing well overall noting LACEY and EDEMA and continues on medication regimen consisting of Breo Ellipta, levalbuterol PRN (Anora Ellipta discontinued on return to previous, and patient has not taked in 3+ weeks, declines to resume, notes no changes with breathing since discontinuation) without adverse reactions or side effects.                                                                                                         .  DEPRESSION - Patient has a long history of Depression of moderate severity requiring medication for control with recent symptoms being stable..Current symptoms of depression include none.                                                                                                                                                                                    .  HYPERLIPIDEMIA - Patient has a long history of significant Hyperlipidemia requiring medication for treatment with recent good control. Patient reports no problems or side effects with the medication.                                                                                                                                                        .  HYPERTENSION - Patient has longstanding history of HTN , currently denies any symptoms referable to elevated blood pressure. Specifically denies chest pain, palpitations, dyspnea, orthopnea, PND or peripheral edema. Blood pressure readings typically controlled, elevated at times likely d/t pain. Current medication regimen is as listed below. Patient denies any side effects of medication.                                                                                                                                                                                          .  RENAL INSUFFICIENCY - Patient has a longstanding history of moderate-severe chronic renal insufficiency. Last Cr 1.31 (baseline 1.1-1.4).                                                                                                                                                                               .    MEDICAL HISTORY:     Patient Active Problem List    Diagnosis Date Noted     Morbid obesity (H) 06/19/2018     Priority: Medium     Mild cognitive impairment 09/22/2017     Priority: Medium     S/P carotid endarterectomy 09/06/2017     Priority: Medium     Carotid stenosis, symptomatic w/o infarct, right 08/31/2017     Priority: Medium     Thyroid nodule 08/23/2017     Priority: Medium     Trigger point of extremity 08/23/2017     Priority: Medium     Trochanteric bursitis of right hip 08/23/2017     Priority: Medium     Urinary tract infection without hematuria, site unspecified 08/23/2017     Priority: Medium     CKD (chronic kidney disease) stage 3, GFR 30-59 ml/min (H) 08/16/2017     Priority: Medium     ACP (advance care planning) 08/03/2017     Priority: Medium     8/3/2017  Recommend Code Status in chart and patient's Advance Care Planning documents be reviewed to ensure alignment with patient's current wishes.  Most recent Advance Care Planning document is a POLST dated 7/24/17  indicating DNI.  Agnieszka Santillan,  Advance Care Planning Liaison           Transient cerebral ischemia, unspecified type 08/01/2017     Priority: Medium     Carotid artery stenosis, symptomatic, right 08/01/2017     Priority: Medium     CVA (cerebral vascular accident) (H) 07/26/2017     Priority: Medium     Chronic obstructive pulmonary disease, unspecified COPD type (H) 07/25/2017     Priority: Medium     Generalized muscle weakness 07/25/2017     Priority: Medium     Dizziness 07/25/2017     Priority: Medium     Osteoarthritis of right hip, unspecified osteoarthritis type 07/25/2017     Priority: Medium     Hypotension, unspecified hypotension type 07/25/2017     Priority: Medium     Hip joint replacement status 04/18/2012     Priority: Medium     Osteoarthritis 04/18/2012     Priority: Medium     DDD (degenerative disc disease), lumbar 04/18/2012     Priority: Medium     Mild major depression (H) 04/18/2012     Priority: Medium     Incontinence of urine 04/18/2012     Priority: Medium     Osteoporosis 10/25/2011     Priority: Medium     S/P laminectomy 10/25/2011     Priority: Medium     CARDIOVASCULAR SCREENING; LDL GOAL LESS THAN 100 10/31/2010     Priority: Medium     CHF (congestive heart failure) (H) 09/17/2008     Priority: Medium     Diastolic disfunction - ECHP 2008       Restrictive lung disease 09/17/2008     Priority: Medium     PFT 4/2008       Renovascular hypertension 11/09/2006     Priority: Medium     Problem list name updated by automated process. Provider to review       Benign neoplasm of colon 10/04/2006     Priority: Medium     Angiodysplasia - due for repeat 10 years.  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 NO edema. Continue current Therapy.HYPERTENSION: Dynacirc CR 10mg daily initiated today. Will continue adjusting blood pressure medicatins as needed until readings at target.VITAMIN D  DEFICIENCY - Completed therapy, discontinue ergocalciferol.ANEMIA, EPO DEFICIENCY - Hgb 10.2. Not on EPO. Continue observation for now. Recnet Hgb drop due to lumbar laminectomy.  Problem list name updated by automated process. Provider to review       Essential hypertension, benign 05/01/2006     Priority: Medium     Atherosclerosis of renal artery (H)      Priority: Medium     Left renal artery stenosis       Esophageal reflux      Priority: Medium     Gastroesophageal Reflux Disease       Cerebral aneurysm, nonruptured      Priority: Medium     Cerebral Aneurysm - unchanged on rcent MRI.  Seen by neurosurg.  Viola she should have follow up MRA every 2 years (due 2010)       Other specified cardiac dysrhythmias(427.89)      Priority: Medium     Bradycardia, improved on lower dose beta blockers         Past Medical History:   Diagnosis Date     ABDOMINAL PAIN GENERALIZED 3/15/2006    1 month of abdominal pain that llqwhich radiates into her back and chest.  Pt was hospitilzed 2x for the pain at [facility] --pt hopsitized for 3 days.  Rcords not ab=vailable.  She was told either \" twisted intestine or blockage of bowel.  Pt feels it is the hiatal hernia.  Pt hospitilized again a second time  A month ago.  Ct scans x 2 showed \" nothing.\"  Pt had a barium and xrays.  Pt sa     Abdominal pain, generalized 3/15/2006    1 month of abdominal pain that llqwhich radiates into her back and chest.  Pt was hospitilzed 2x for the pain at [facility] --pt hopsitized for 3 days.  Rcords not ab=vailable.  She was told either \" twisted intestine or blockage of bowel.  Pt feels it is the hiatal hernia.  Pt hospitilized again a second time  A month " "ago.  Ct scans x 2 showed \" nothing.\"  Pt had a barium and xrays.  Pt sa     Atherosclerosis of renal artery (H)     Left renal artery stenosis     BENIGN HYPERTENSION 5/1/2006 5/1/2006   Increase catapres to 0.3 mg and decrease clonidine to 0.1 mg daily.  Recheck bp in 1 month.      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     Dyspepsia     Generalized osteoarthrosis, unspecified site     DJD-chronic back pain     Herpes zoster dermatitis of eyelid      Insomnia, unspecified      Lumbago     Chronic back pain     Other chest pain     Atypical Chest Pain     Other specified cardiac dysrhythmias(427.89)     Bradycardia, improved on lower dose beta blockers      Rectocele     Grade 3     Unspecified disorder of kidney and ureter     Renal insufficiency     Unspecified essential hypertension      Past Surgical History:   Procedure Laterality Date     CHOLECYSTECTOMY, LAPOROSCOPIC  1997    Cholecystectomy, Laparoscopic     ENDARTERECTOMY CAROTID Right 8/31/2017    Procedure: ENDARTERECTOMY CAROTID;  RIGHT CAROTID ENDARTERECTOMY WITH EEG;  Surgeon: Hilario Parry MD;  Location:  OR     HYSTERECTOMY, RAYMOND  1982    oophorectomy,RAYMOND     SURGICAL HISTORY OF -       Laminectomy x 3     SURGICAL HISTORY OF -       MCA Aneurysm repair     SURGICAL HISTORY OF -   1996    Bladder suspension (Bladder Repair x2)     SURGICAL HISTORY OF -       Bilateral Hand Surgeries for Arthritis x2     SURGICAL HISTORY OF -       Repair of a Cerebral Aneurysm     Current Outpatient Medications   Medication Sig Dispense Refill     acetaminophen (TYLENOL) 325 MG tablet Take 650 mg by mouth every 4 hours as needed for mild pain       " Alendronate Sodium (FOSAMAX PO) Take 10 mg by mouth       Ascorbic Acid (VITAMIN C PO) Take 500 mg by mouth daily       aspirin 81 MG chewable tablet Take 81 mg by mouth       B COMPLEX-C-E-ZN PO Take 1 tablet by mouth daily       CALCIUM 600+D 600-200 MG-UNIT TABS TAKE 1 TAB BY MOUTH ONCE DAILY 30 tablet 11     calcium carbonate (TUMS) 500 MG chewable tablet Take 1 chew tab by mouth every hour as needed for heartburn       DONEPEZIL HCL PO Take 5 mg by mouth daily       DULoxetine HCl (CYMBALTA PO) Take 30 mg by mouth daily       fluticasone (FLONASE) 50 MCG/ACT spray Spray 1 spray into both nostrils 2 times daily as needed        fluticasone-vilanterol (BREO ELLIPTA) 100-25 MCG/INH oral inhaler Inhale 1 puff into the lungs daily       Furosemide (LASIX PO) Take 40 mg by mouth daily And 20mg q afternoon.       FUROSEMIDE PO Take 20 mg by mouth daily as needed for weight gain daily for >2 pound weight gain in one day       GABAPENTIN PO Take 100 mg by mouth every morning       GABAPENTIN PO Take 200 mg by mouth At Bedtime        guaiFENesin (ROBITUSSIN) 100 MG/5ML SYRP Take 10 mLs by mouth every 4 hours as needed for cough        HYDROcodone-acetaminophen (NORCO) 5-325 MG tablet Take 1 tablet by mouth 3 times daily       HYDROcodone-acetaminophen (NORCO) 5-325 MG tablet Take 1 tablet by mouth every 4 hours as needed for severe pain       hydrocortisone 1 % CREA cream Apply topically every 6 hours as needed for itching       levalbuterol (XOPENEX) 1.25 MG/3ML neb solution Take 1 ampule by nebulization every 4 hours as needed for shortness of breath / dyspnea or wheezing       loperamide (IMODIUM) 2 MG capsule Take 2 mg by mouth 4 times daily as needed for diarrhea       magnesium hydroxide (MILK OF MAGNESIA) 400 MG/5ML suspension Take 30 mLs by mouth daily as needed for constipation or heartburn       MAGNESIUM OXIDE PO Take 250 mg by mouth daily       Menthol, Topical Analgesic, (BIOFREEZE) 4 % GEL Externally apply  topically daily And Three times a day PRN       methylPREDNISolone (MEDROL DOSEPAK) 4 MG tablet Take 4 mg by mouth daily follow package directions       nystatin (MYCOSTATIN) 655759 UNIT/GM POWD Apply topically 2 times daily as needed        OMEPRAZOLE PO Take 40 mg by mouth every morning       Ondansetron (ZOFRAN ODT PO) Take 4 mg by mouth every 8 hours as needed for nausea       polyethylene glycol (MIRALAX/GLYCOLAX) Packet Take 17 g by mouth daily as needed        polyvinyl alcohol (LIQUIFILM TEARS) 1.4 % ophthalmic solution Place 1 drop into both eyes 2 times daily And 4 times a day PRN       SIMETHICONE-80 PO Take 160 mg by mouth 4 times daily as needed for intestinal gas        atorvastatin (LIPITOR) 40 MG tablet TAKE 1 TABLET BY MOUTH ONCE DAILY 31 tablet 98     donepezil (ARICEPT) 5 MG tablet TAKE 1 TABLET BY MOUTH ONCE DAILY 31 tablet 98     DULoxetine (CYMBALTA) 30 MG capsule TAKE 1 CAPSULE BY MOUTH ONCE DAILY 31 capsule 98     furosemide (LASIX) 20 MG tablet TAKE 1 TABLET BY MOUTH ONCE DAILY IN THE AFTERNOON 31 tablet 98     furosemide (LASIX) 40 MG tablet TAKE 1 TABLET BY MOUTH EVERY MORNING 31 tablet 98     gabapentin (NEURONTIN) 100 MG capsule TAKE 1 CAPSULE BY MOUTH EVERY MORNING;AND TAKE TWO CAPSULES (200MG) BY MOUTH AT BEDTIME 93 capsule 98     isosorbide mononitrate (IMDUR) 30 MG 24 hr tablet TAKE 1 TABLET BY MOUTH ONCE DAILY 31 tablet 98     isradipine (DYNACIRC) 5 MG capsule TAKE 1 CAPSULE BY MOUTH TWICE DAILY 62 capsule 98     memantine (NAMENDA) 5 MG tablet TAKE 1 TABLET BY MOUTH ONCE DAILY 31 tablet 98     methylPREDNISolone (MEDROL) 4 MG tablet TAKE 1 TABLET BY MOUTH ONCE DAILY 31 tablet 98     omeprazole (PRILOSEC) 40 MG DR capsule TAKE 1 CAPSULE BY MOUTH ONCE DAILY 31 capsule 98     STOOL SOFTENER/LAXATIVE 50-8.6 MG tablet TAKE 1 TABLET BY MOUTH TWICE DAILY SCHEDULED;AND GIVE 1 TABLET BY MOUTH EVERY 24 HOURS AS NEEDED FOR CONSTIPATION. HOLD FOR LOOSE STOOL 62 tablet 98     OTC products:  None, except as noted above    Allergies   Allergen Reactions     Accupril      Ace Inhibitors Unknown     Accupril     Augmentin Unknown     Levofloxacin Unknown     Macrobid [Nitrofurantoin]      Morphine Other (See Comments)     hallucinations     Norvasc [Amlodipine Besylate]      Leg swelling       Quinapril       Latex Allergy: NO    Social History     Tobacco Use     Smoking status: Former Smoker     Last attempt to quit: 1992     Years since quittin.0   Substance Use Topics     Alcohol use: Yes     Comment: wine occas.     History   Drug Use No       REVIEW OF SYSTEMS:   10 point ROS of systems including Constitutional, Eyes, Respiratory, Cardiovascular, Gastroenterology, Genitourinary, Integumentary, Musculoskeletal, Psychiatric were all negative except for pertinent positives noted in my HPI.    EXAM:   BP (!) 138/92   Pulse 66   Temp 98  F (36.7  C)   Resp 18   Wt 88.2 kg (194 lb 6.4 oz)   SpO2 93%   BMI 34.44 kg/m     GENERAL APPEARANCE:  Alert, in no distress, oriented, morbidly obese, cooperative  RESP:  respiratory effort and palpation of chest normal, lungs clear to auscultation , no respiratory distress, on RA, no cough noted  CV:  Palpation and auscultation of heart done , regular rate and rhythm, no murmur, rub, or gallop, +2 pedal pulses, peripheral edema 2-3+ in bilat ankles  ABDOMEN:  normal bowel sounds, soft, nontender, no hepatosplenomegaly or other masses, large, round, soft, no guarding or rebound  M/S:   PETERSEN purposefully, mild tenderness to lumbar spine, small steps with walker, ambulates onyl short distances, no gross joint deformities  SKIN:  Inspection of skin and subcutaneous tissue baseline, Palpation of skin and subcutaneous tissue baseline, palpable seroma to right groin/upper thigh, nontender, stable in size  NEURO:   Cranial nerves 2-12 are normal tested and grossly at patient's baseline  PSYCH:  oriented X 3, normal insight, judgement and memory, affect and mood  normal    DIAGNOSTICS:     EKG: appears normal, NSR, normal axis, normal intervals, no acute ST/T changes c/w ischemia, mild LVH by voltage criteria, unchanged from previous tracings  Hemoglobin (indicated for history of anemia or procedure with significant blood loss such as tonsillectomy, major intraperitoneal surgery, vascular surgery, major spine surgery, total joint replacement)  Serum Potassium  Serum Creatinine  Labs Drawn and in Process:   Unresulted Labs Ordered in the Past 30 Days of this Admission     No orders found from 10/29/2018 to 12/29/2018.          Recent Labs   Lab Test 12/11/18  0805 12/03/18  0755  11/23/18  1042 11/14/18  0802  08/31/17  1113  07/26/17  1130   HGB  --   --   --  11.1* 9.8*   < >  --    < > 11.0*   PLT  --   --   --  202 193   < > 172   < > 156   INR  --   --   --   --   --   --   --   --  1.07    140   < > 139  --    < > 141   < > 137   POTASSIUM 4.3 4.4   < > 5.8*  --    < > 5.3   < > 4.0   CR 1.31* 1.68*   < > 1.65*  --    < > 1.42*   < > 1.50*   A1C  --   --   --   --   --   --  6.2*  --   --     < > = values in this interval not displayed.        IMPRESSION:   Reason for surgery/procedure: lumbar spinal stenosis  Diagnosis/reason for consult: lumbar spinal stenosis    The proposed surgical procedure is considered INTERMEDIATE risk.    REVISED CARDIAC RISK INDEX  The patient has the following serious cardiovascular risks for perioperative complications such as (MI, PE, VFib and 3  AV Block):  Congestive Heart Failure (pulmonary edema, PND, s3 holli, CXR with pulmonary congestion, basilar rales)  Cerebrovascular Disease (TIA or CVA)  INTERPRETATION: 2 risks: Class III (moderate risk - 6.6% complication rate)    The patient has the following additional risks for perioperative complications:  No identified additional risks  Delirium or Dementia  Morbid obesity      ICD-10-CM    1. Spinal stenosis of lumbar region, unspecified whether neurogenic claudication present  M48.061    2. CKD (chronic kidney disease) stage 3, GFR 30-59 ml/min (H) N18.3    3. Congestive heart failure, unspecified HF chronicity, unspecified heart failure type (H) I50.9    4. Benign essential hypertension I10    5. Chronic obstructive pulmonary disease, unspecified COPD type (H) J44.9    6. H/O: CVA (cerebrovascular accident) Z86.73    7. Dementia without behavioral disturbance, unspecified dementia type F03.90    8. Gastroesophageal reflux disease without esophagitis K21.9        ORDERS:     --Consult hospital rounder / IM to assist post-op medical management    Cardiovascular Risk  Beta blockers contraindicated due to history of bradycardia        Pulmonary Risk  Incentive spirometry post op  Respiratory Therapy (Respiratory Care IP Consult)  post op  Maximize COPD treatment - respiratory status stable on current medication,will discontinue Anoro Ellipta as not taking and patient declines to resume       Anemia  Anemia and does not require treatment prior to surgery.  Monitor Hemoglobin postoperatively.      --Patient is to take all scheduled medications on the day of surgery EXCEPT for modifications listed below.    Anticoagulant or Antiplatelet Medication Use  ASPIRIN: Discontinue ASA 7-10 days prior to procedure to reduce bleeding risk.  It should be resumed post-operatively.        Chronic Corticosteroid Use  Stress dose steroids are indicated due to chronic steroid use in last 3 months (e.g. >3 weeks of predisone 20 mg or daily prednisone 5 mg)      APPROVAL GIVEN to proceed with proposed procedure, without further diagnostic evaluation    Orders:  1. discontinue Cyclobenzaprine, Calmoseptine, cough lozenges  2. discontinue Anoro elipta inhaler per patient request  3. discontinue spironolactone (discontinued in TCU d/t hyperkalemia)  4. BMP, Hgb on next lab day   5. Change miralax to 17 gm PO every day PRN   6. DC zinc (facilty able to supply order vitamin B/zinc supplement not available in TCU)  7.  EKG re CHF, Pre op clearance  8. Hold Aspirin starting 1/2/18  9. On morning of surgery hold senna, calcium, vitamin B, Vitamin C           Signed Electronically by: MARY Gómez CNP    Copy of this evaluation report is provided to requesting physician.    Chip Preop Guidelines    Revised Cardiac Risk Index      Sincerely,        MARY Gómez CNP

## 2018-12-28 NOTE — PROGRESS NOTES
PRE-OP EVALUATION:    [place] Medical Record Number:  [id number]  Place of Service where encounter took place:  [facility] [[id number]]  Today's date: 2018    [facility]  [address]  [address]  [place] [address]  [phone]  Dept: [phone]    PRE-OP EVALUATION:  Today's date: 2018    Jazmin Clifton (: 1932) presents for pre-operative evaluation assessment as requested by Dr. Morris.  She requires evaluation and anesthesia risk assessment prior to undergoing surgery/procedure for treatment of Lumbar spinal stenosis .    Proposed Surgery/ Procedure: L1-2 decompression  Date of Surgery/ Procedure: 2019  Time of Surgery/ Procedure: unknown  Hospital/Surgical Facility: unknown  Primary Physician: Junie Estrella  Type of Anesthesia Anticipated: General    Patient has a Health Care Directive or Living Will:  YES full code    1. YES - DO YOU HAVE A HISTORY OF HEART ATTACK, STROKE, STENT, BYPASS OR SURGERY ON AN ARTERY IN THE HEAD, NECK, HEART OR LEG? H/o CVA with MCA stent  2. NO - Do you ever have any pain or discomfort in your chest?  3. YES - DO YOU HAVE A HISTORY OF HEART FAILURE H/o CHF with Echo 2017 showing EF of 60-65%  4. YES - ARE YOUR TROUBLED BY SHORTNESS OF BREATH WHEN WALKING ON THE LEVEL, UP A SLIGHT HILL OR AT NIGHT? H/o COPD. CHF, SOB when walking up a hill or long distances  5. NO - Do you currently have a cold, bronchitis or other respiratory infection?  6. YES - DO YOU HAVE A COUGH, SHORTNESS OF BREATH OR WHEEZING? Chronic occasional cough d/t COPD, at baseline  7. NO - Do you sometimes get pains in the calves of your legs when you walk?  8. NO - Do you or anyone in your family have previous history of blood clots?  9. YES - DO YOU OR DOES ANYONE IN YOUR FAMILY HAVE A SERIOUS BLEEDING PROBLEM SUCH AS PROLONGED BLEEDING FOLLOWING SURGERIES OR CUTS? Mother had bleeding problems, patient denies any h/o clots or  inappropriate bleeding  10. YES - HAVE YOU EVER HAD PROBLEMS WITH ANEMIA OR BEEN TOLD TO TAKE IRON PILLS? On vitamin B supplement for chronic anemia.   11. NO - Have you had any abnormal blood loss such as black, tarry or bloody stools, or abnormal vaginal bleeding?  12. NO - Have you ever had a blood transfusion?  13. NO - Have you or any of your relatives ever had problems with anesthesia?  14. NO - Do you have sleep apnea, excessive snoring or daytime drowsiness?  15. NO - Do you have any prosthetic heart valves?  16. YES - DO YOU HAVE PROSTHETIC JOINTS? Left hip joint prosthesis  17. NO - Is there any chance that you may be pregnant?      HPI:     HPI related to upcoming procedure:   Jazmin Clifton is a 85 y.o. Female with PMH of CKD, CHF, COPD, previous laminectomy, dementia and falls with left metatarsal fracture who was admitted to the hospital on 10/30/18 with back pain . MRI lumbar spine revealed moderate to marked canal stenosis at L1-L2 and neurosurgery was consulted. She was started on medrol, zinc and vitamin C. Neurosurgery recommended L1-2 decompression. Surgery was planned for early November, however was delayed and patient developed seroma and neurosurgery wanted clearance from vascular surgery prior to lumbar decompression. She was cleared for surgery in early December, however then need prior authorization prior to undergoing surgery, which is now scheduled for 1/9/2018.    ANEMIA - Patient has a recent history of moderate-severe anemia, which has not been symptomatic - has been chronic and stable. Work up to date has revealed Hgb 11.1, chronic and. Treatment has been Vitmain B supplementation, .                                                                                                                                                                                                                                                                                          .  CHF - Patient has a  longstanding history of moderate-severe CHF. Exacerbating conditions include hypertension and COPD. Currently the patient's condition is same. Current treatment regimen includes long acting nitrate, calcium channel blocker, ASA, diuretic (ACE-inhibitor and spironolactone discontinued d/t hyperkalemia). The patient denies chest pain, edema, orthopnea, SOB or recent weight gain. Last Echocardiogram 7/2017; EF 60-65%, normal LVH, EKG NSR, no changed from previous EKG.                                                                                                                                                                               .  COPD - Patient has a longstanding history of moderate-severe COPD . Patient has been doing well overall noting LACEY and EDEMA and continues on medication regimen consisting of Breo Ellipta, levalbuterol PRN (Anora Ellipta discontinued on return to previous, and patient has not taked in 3+ weeks, declines to resume, notes no changes with breathing since discontinuation) without adverse reactions or side effects.                                                                                                         .  DEPRESSION - Patient has a long history of Depression of moderate severity requiring medication for control with recent symptoms being stable..Current symptoms of depression include none.                                                                                                                                                                                    .  HYPERLIPIDEMIA - Patient has a long history of significant Hyperlipidemia requiring medication for treatment with recent good control. Patient reports no problems or side effects with the medication.                                                                                                                                                       .  HYPERTENSION - Patient has longstanding history of HTN ,  currently denies any symptoms referable to elevated blood pressure. Specifically denies chest pain, palpitations, dyspnea, orthopnea, PND or peripheral edema. Blood pressure readings typically controlled, elevated at times likely d/t pain. Current medication regimen is as listed below. Patient denies any side effects of medication.                                                                                                                                                                                          .  RENAL INSUFFICIENCY - Patient has a longstanding history of moderate-severe chronic renal insufficiency. Last Cr 1.31 (baseline 1.1-1.4).                                                                                                                                                                               .    MEDICAL HISTORY:     Patient Active Problem List    Diagnosis Date Noted     Morbid obesity (H) 06/19/2018     Priority: Medium     Mild cognitive impairment 09/22/2017     Priority: Medium     S/P carotid endarterectomy 09/06/2017     Priority: Medium     Carotid stenosis, symptomatic w/o infarct, right 08/31/2017     Priority: Medium     Thyroid nodule 08/23/2017     Priority: Medium     Trigger point of extremity 08/23/2017     Priority: Medium     Trochanteric bursitis of right hip 08/23/2017     Priority: Medium     Urinary tract infection without hematuria, site unspecified 08/23/2017     Priority: Medium     CKD (chronic kidney disease) stage 3, GFR 30-59 ml/min (H) 08/16/2017     Priority: Medium     ACP (advance care planning) 08/03/2017     Priority: Medium     8/3/2017  Recommend Code Status in chart and patient's Advance Care Planning documents be reviewed to ensure alignment with patient's current wishes.  Most recent Advance Care Planning document is a POLST dated 7/24/17 indicating DNI.  Agnieszka Santillan,  Advance Care Planning Liaison           Transient cerebral ischemia,  unspecified type 08/01/2017     Priority: Medium     Carotid artery stenosis, symptomatic, right 08/01/2017     Priority: Medium     CVA (cerebral vascular accident) (H) 07/26/2017     Priority: Medium     Chronic obstructive pulmonary disease, unspecified COPD type (H) 07/25/2017     Priority: Medium     Generalized muscle weakness 07/25/2017     Priority: Medium     Dizziness 07/25/2017     Priority: Medium     Osteoarthritis of right hip, unspecified osteoarthritis type 07/25/2017     Priority: Medium     Hypotension, unspecified hypotension type 07/25/2017     Priority: Medium     Hip joint replacement status 04/18/2012     Priority: Medium     Osteoarthritis 04/18/2012     Priority: Medium     DDD (degenerative disc disease), lumbar 04/18/2012     Priority: Medium     Mild major depression (H) 04/18/2012     Priority: Medium     Incontinence of urine 04/18/2012     Priority: Medium     Osteoporosis 10/25/2011     Priority: Medium     S/P laminectomy 10/25/2011     Priority: Medium     CARDIOVASCULAR SCREENING; LDL GOAL LESS THAN 100 10/31/2010     Priority: Medium     CHF (congestive heart failure) (H) 09/17/2008     Priority: Medium     Diastolic disfunction - ECHP 2008       Restrictive lung disease 09/17/2008     Priority: Medium     PFT 4/2008       Renovascular hypertension 11/09/2006     Priority: Medium     Problem list name updated by automated process. Provider to review       Benign neoplasm of colon 10/04/2006     Priority: Medium     Angiodysplasia - due for repeat 10 years.  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 NO edema.  "Continue current Therapy.HYPERTENSION: Dynacirc CR 10mg daily initiated today. Will continue adjusting blood pressure medicatins as needed until readings at target.VITAMIN D  DEFICIENCY - Completed therapy, discontinue ergocalciferol.ANEMIA, EPO DEFICIENCY - Hgb 10.2. Not on EPO. Continue observation for now. Recnet Hgb drop due to lumbar laminectomy.  Problem list name updated by automated process. Provider to review       Essential hypertension, benign 05/01/2006     Priority: Medium     Atherosclerosis of renal artery (H)      Priority: Medium     Left renal artery stenosis       Esophageal reflux      Priority: Medium     Gastroesophageal Reflux Disease       Cerebral aneurysm, nonruptured      Priority: Medium     Cerebral Aneurysm - unchanged on rcent MRI.  Seen by neurosurg.  Ione she should have follow up MRA every 2 years (due 2010)       Other specified cardiac dysrhythmias(427.89)      Priority: Medium     Bradycardia, improved on lower dose beta blockers         Past Medical History:   Diagnosis Date     ABDOMINAL PAIN GENERALIZED 3/15/2006    1 month of abdominal pain that llqwhich radiates into her back and chest.  Pt was hospitilzed 2x for the pain at [facility] --pt hopsitized for 3 days.  Rcords not ab=vailable.  She was told either \" twisted intestine or blockage of bowel.  Pt feels it is the hiatal hernia.  Pt hospitilized again a second time  A month ago.  Ct scans x 2 showed \" nothing.\"  Pt had a barium and xrays.  Pt sa     Abdominal pain, generalized 3/15/2006    1 month of abdominal pain that llqwhich radiates into her back and chest.  Pt was hospitilzed 2x for the pain at [facility] --pt hopsitized for 3 days.  Rcords not ab=vailable.  She was told either \" twisted intestine or blockage of bowel.  Pt feels it is the hiatal hernia.  Pt hospitilized again a second time  A month ago.  Ct scans x 2 showed \" nothing.\"  Pt had a barium and xrays.  Pt sa     Atherosclerosis of renal " artery (H)     Left renal artery stenosis     BENIGN HYPERTENSION 5/1/2006 5/1/2006   Increase catapres to 0.3 mg and decrease clonidine to 0.1 mg daily.  Recheck bp in 1 month.      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     Dyspepsia     Generalized osteoarthrosis, unspecified site     DJD-chronic back pain     Herpes zoster dermatitis of eyelid      Insomnia, unspecified      Lumbago     Chronic back pain     Other chest pain     Atypical Chest Pain     Other specified cardiac dysrhythmias(427.89)     Bradycardia, improved on lower dose beta blockers      Rectocele     Grade 3     Unspecified disorder of kidney and ureter     Renal insufficiency     Unspecified essential hypertension      Past Surgical History:   Procedure Laterality Date     CHOLECYSTECTOMY, LAPOROSCOPIC  1997    Cholecystectomy, Laparoscopic     ENDARTERECTOMY CAROTID Right 8/31/2017    Procedure: ENDARTERECTOMY CAROTID;  RIGHT CAROTID ENDARTERECTOMY WITH EEG;  Surgeon: Hilario Parry MD;  Location: [facility]     HYSTERECTOMY, RAYMOND  1982    oophorectomy,RAYMOND     SURGICAL HISTORY OF -       Laminectomy x 3     SURGICAL HISTORY OF -       MCA Aneurysm repair     SURGICAL HISTORY OF -   1996    Bladder suspension (Bladder Repair x2)     SURGICAL HISTORY OF -       Bilateral Hand Surgeries for Arthritis x2     SURGICAL HISTORY OF -       Repair of a Cerebral Aneurysm     Current Outpatient Medications   Medication Sig Dispense Refill     acetaminophen (TYLENOL) 325 MG tablet Take 650 mg by mouth every 4 hours as needed for mild pain       Alendronate Sodium (FOSAMAX PO) Take 10 mg by mouth       Ascorbic Acid (VITAMIN C PO) Take 500 mg by mouth  daily       aspirin 81 MG chewable tablet Take 81 mg by mouth       B COMPLEX-C-E-ZN PO Take 1 tablet by mouth daily       CALCIUM 600+D 600-200 MG-UNIT TABS TAKE 1 TAB BY MOUTH ONCE DAILY 30 tablet 11     calcium carbonate (TUMS) 500 MG chewable tablet Take 1 chew tab by mouth every hour as needed for heartburn       DONEPEZIL HCL PO Take 5 mg by mouth daily       DULoxetine HCl (CYMBALTA PO) Take 30 mg by mouth daily       fluticasone (FLONASE) 50 MCG/ACT spray Spray 1 spray into both nostrils 2 times daily as needed        fluticasone-vilanterol (BREO ELLIPTA) 100-25 MCG/INH oral inhaler Inhale 1 puff into the lungs daily       Furosemide (LASIX PO) Take 40 mg by mouth daily And 20mg q afternoon.       FUROSEMIDE PO Take 20 mg by mouth daily as needed for weight gain daily for >2 pound weight gain in one day       GABAPENTIN PO Take 100 mg by mouth every morning       GABAPENTIN PO Take 200 mg by mouth At Bedtime        guaiFENesin (ROBITUSSIN) 100 MG/5ML SYRP Take 10 mLs by mouth every 4 hours as needed for cough        HYDROcodone-acetaminophen (NORCO) 5-325 MG tablet Take 1 tablet by mouth 3 times daily       HYDROcodone-acetaminophen (NORCO) 5-325 MG tablet Take 1 tablet by mouth every 4 hours as needed for severe pain       hydrocortisone 1 % CREA cream Apply topically every 6 hours as needed for itching       levalbuterol (XOPENEX) 1.25 MG/3ML neb solution Take 1 ampule by nebulization every 4 hours as needed for shortness of breath / dyspnea or wheezing       loperamide (IMODIUM) 2 MG capsule Take 2 mg by mouth 4 times daily as needed for diarrhea       magnesium hydroxide (MILK OF MAGNESIA) 400 MG/5ML suspension Take 30 mLs by mouth daily as needed for constipation or heartburn       MAGNESIUM OXIDE PO Take 250 mg by mouth daily       Menthol, Topical Analgesic, (BIOFREEZE) 4 % GEL Externally apply topically daily And Three times a day PRN       methylPREDNISolone (MEDROL DOSEPAK) 4 MG tablet Take 4 mg by  mouth daily follow package directions       nystatin (MYCOSTATIN) 948128 UNIT/GM POWD Apply topically 2 times daily as needed        OMEPRAZOLE PO Take 40 mg by mouth every morning       Ondansetron (ZOFRAN ODT PO) Take 4 mg by mouth every 8 hours as needed for nausea       polyethylene glycol (MIRALAX/GLYCOLAX) Packet Take 17 g by mouth daily as needed        polyvinyl alcohol (LIQUIFILM TEARS) 1.4 % ophthalmic solution Place 1 drop into both eyes 2 times daily And 4 times a day PRN       SIMETHICONE-80 PO Take 160 mg by mouth 4 times daily as needed for intestinal gas        atorvastatin (LIPITOR) 40 MG tablet TAKE 1 TABLET BY MOUTH ONCE DAILY 31 tablet 98     donepezil (ARICEPT) 5 MG tablet TAKE 1 TABLET BY MOUTH ONCE DAILY 31 tablet 98     DULoxetine (CYMBALTA) 30 MG capsule TAKE 1 CAPSULE BY MOUTH ONCE DAILY 31 capsule 98     furosemide (LASIX) 20 MG tablet TAKE 1 TABLET BY MOUTH ONCE DAILY IN THE AFTERNOON 31 tablet 98     furosemide (LASIX) 40 MG tablet TAKE 1 TABLET BY MOUTH EVERY MORNING 31 tablet 98     gabapentin (NEURONTIN) 100 MG capsule TAKE 1 CAPSULE BY MOUTH EVERY MORNING;AND TAKE TWO CAPSULES (200MG) BY MOUTH AT BEDTIME 93 capsule 98     isosorbide mononitrate (IMDUR) 30 MG 24 hr tablet TAKE 1 TABLET BY MOUTH ONCE DAILY 31 tablet 98     isradipine (DYNACIRC) 5 MG capsule TAKE 1 CAPSULE BY MOUTH TWICE DAILY 62 capsule 98     memantine (NAMENDA) 5 MG tablet TAKE 1 TABLET BY MOUTH ONCE DAILY 31 tablet 98     methylPREDNISolone (MEDROL) 4 MG tablet TAKE 1 TABLET BY MOUTH ONCE DAILY 31 tablet 98     omeprazole (PRILOSEC) 40 MG DR capsule TAKE 1 CAPSULE BY MOUTH ONCE DAILY 31 capsule 98     STOOL SOFTENER/LAXATIVE 50-8.6 MG tablet TAKE 1 TABLET BY MOUTH TWICE DAILY SCHEDULED;AND GIVE 1 TABLET BY MOUTH EVERY 24 HOURS AS NEEDED FOR CONSTIPATION. HOLD FOR LOOSE STOOL 62 tablet 98     OTC products: None, except as noted above    Allergies   Allergen Reactions     Accupril      Ace Inhibitors Unknown      Accupril     Augmentin Unknown     Levofloxacin Unknown     Macrobid [Nitrofurantoin]      Morphine Other (See Comments)     hallucinations     Norvasc [Amlodipine Besylate]      Leg swelling       Quinapril       Latex Allergy: NO    Social History     Tobacco Use     Smoking status: Former Smoker     Last attempt to quit: 1992     Years since quittin.0   Substance Use Topics     Alcohol use: Yes     Comment: wine occas.     History   Drug Use No       REVIEW OF SYSTEMS:   10 point ROS of systems including Constitutional, Eyes, Respiratory, Cardiovascular, Gastroenterology, Genitourinary, Integumentary, Musculoskeletal, Psychiatric were all negative except for pertinent positives noted in my HPI.    EXAM:   BP (!) 138/92   Pulse 66   Temp 98  F (36.7  C)   Resp 18   Wt 88.2 kg (194 lb 6.4 oz)   SpO2 93%   BMI 34.44 kg/m    GENERAL APPEARANCE:  Alert, in no distress, oriented, morbidly obese, cooperative  RESP:  respiratory effort and palpation of chest normal, lungs clear to auscultation , no respiratory distress, on RA, no cough noted  CV:  Palpation and auscultation of heart done , regular rate and rhythm, no murmur, rub, or gallop, +2 pedal pulses, peripheral edema 2-3+ in bilat ankles  ABDOMEN:  normal bowel sounds, soft, nontender, no hepatosplenomegaly or other masses, large, round, soft, no guarding or rebound  M/S:   PETERSEN purposefully, mild tenderness to lumbar spine, small steps with walker, ambulates onyl short distances, no gross joint deformities  SKIN:  Inspection of skin and subcutaneous tissue baseline, Palpation of skin and subcutaneous tissue baseline, palpable seroma to right groin/upper thigh, nontender, stable in size  NEURO:   Cranial nerves 2-12 are normal tested and grossly at patient's baseline  PSYCH:  oriented X 3, normal insight, judgement and memory, affect and mood normal    DIAGNOSTICS:     EKG: appears normal, NSR, normal axis, normal intervals, no acute ST/T changes c/w  ischemia, mild LVH by voltage criteria, unchanged from previous tracings  Hemoglobin (indicated for history of anemia or procedure with significant blood loss such as tonsillectomy, major intraperitoneal surgery, vascular surgery, major spine surgery, total joint replacement)  Serum Potassium  Serum Creatinine  Labs Drawn and in Process:   Unresulted Labs Ordered in the Past 30 Days of this Admission     No orders found from 10/29/2018 to 12/29/2018.          Recent Labs   Lab Test 12/11/18  0805 12/03/18  0755  11/23/18  1042 11/14/18  0802  08/31/17  1113  07/26/17  1130   HGB  --   --   --  11.1* 9.8*   < >  --    < > 11.0*   PLT  --   --   --  202 193   < > 172   < > 156   INR  --   --   --   --   --   --   --   --  1.07    140   < > 139  --    < > 141   < > 137   POTASSIUM 4.3 4.4   < > 5.8*  --    < > 5.3   < > 4.0   CR 1.31* 1.68*   < > 1.65*  --    < > 1.42*   < > 1.50*   A1C  --   --   --   --   --   --  6.2*  --   --     < > = values in this interval not displayed.        IMPRESSION:   Reason for surgery/procedure: lumbar spinal stenosis  Diagnosis/reason for consult: lumbar spinal stenosis    The proposed surgical procedure is considered INTERMEDIATE risk.    REVISED CARDIAC RISK INDEX  The patient has the following serious cardiovascular risks for perioperative complications such as (MI, PE, VFib and 3  AV Block):  Congestive Heart Failure (pulmonary edema, PND, s3 holli, CXR with pulmonary congestion, basilar rales)  Cerebrovascular Disease (TIA or CVA)  INTERPRETATION: 2 risks: Class III (moderate risk - 6.6% complication rate)    The patient has the following additional risks for perioperative complications:  No identified additional risks  Delirium or Dementia  Morbid obesity      ICD-10-CM    1. Spinal stenosis of lumbar region, unspecified whether neurogenic claudication present M48.061    2. CKD (chronic kidney disease) stage 3, GFR 30-59 ml/min (H) N18.3    3. Congestive heart failure,  unspecified HF chronicity, unspecified heart failure type (H) I50.9    4. Benign essential hypertension I10    5. Chronic obstructive pulmonary disease, unspecified COPD type (H) J44.9    6. H/O: CVA (cerebrovascular accident) Z86.73    7. Dementia without behavioral disturbance, unspecified dementia type F03.90    8. Gastroesophageal reflux disease without esophagitis K21.9        ORDERS:     --Consult hospital rounder / IM to assist post-op medical management    Cardiovascular Risk  Beta blockers contraindicated due to history of bradycardia        Pulmonary Risk  Incentive spirometry post op  Respiratory Therapy (Respiratory Care IP Consult)  post op  Maximize COPD treatment - respiratory status stable on current medication,will discontinue Anoro Ellipta as not taking and patient declines to resume       Anemia  Anemia and does not require treatment prior to surgery.  Monitor Hemoglobin postoperatively.      --Patient is to take all scheduled medications on the day of surgery EXCEPT for modifications listed below.    Anticoagulant or Antiplatelet Medication Use  ASPIRIN: Discontinue ASA 7-10 days prior to procedure to reduce bleeding risk.  It should be resumed post-operatively.        Chronic Corticosteroid Use  Stress dose steroids are indicated due to chronic steroid use in last 3 months (e.g. >3 weeks of predisone 20 mg or daily prednisone 5 mg)      APPROVAL GIVEN to proceed with proposed procedure, without further diagnostic evaluation    Orders:  1. discontinue Cyclobenzaprine, Calmoseptine, cough lozenges  2. discontinue Anoro elipta inhaler per patient request  3. discontinue spironolactone (discontinued in TCU d/t hyperkalemia)  4. BMP, Hgb on next lab day   5. Change miralax to 17 gm PO every day PRN   6. DC zinc (facilty able to supply order vitamin B/zinc supplement not available in TCU)  7. EKG re CHF, Pre op clearance  8. Hold Aspirin starting 1/2/18  9. On morning of surgery hold senna, calcium,  vitamin B, Vitamin C           Signed Electronically by: MARY Gómez CNP    Copy of this evaluation report is provided to requesting physician.    [place] Preop Guidelines    Revised Cardiac Risk Index

## 2018-12-28 NOTE — LETTER
2018        RE: [facility]  [address]  [place] [address]              SPRE-OP EVALUATION:    [facility] Record Number:  [id number]  Place of Service where encounter took place:  [facility] [[id number]]  Today's date: 2018    [facility]  [address]  [facility] [address]  [facility] [address]  [phone]  Dept: [phone]    PRE-OP EVALUATION:  Today's date: 2018    Jazmin Clifton (: 1932) presents for pre-operative evaluation assessment as requested by Dr. Morris.  She requires evaluation and anesthesia risk assessment prior to undergoing surgery/procedure for treatment of Lumbar spinal stenosis .    Proposed Surgery/ Procedure: L1-2 decompression  Date of Surgery/ Procedure: 2019  Time of Surgery/ Procedure: unknown  Hospital/Surgical Facility: unknown  Primary Physician: Junie Estrella  Type of Anesthesia Anticipated: General    Patient has a Health Care Directive or Living Will:  YES full code    1. YES - DO YOU HAVE A HISTORY OF HEART ATTACK, STROKE, STENT, BYPASS OR SURGERY ON AN ARTERY IN THE HEAD, NECK, HEART OR LEG? H/o CVA with MCA stent  2. NO - Do you ever have any pain or discomfort in your chest?  3. YES - DO YOU HAVE A HISTORY OF HEART FAILURE H/o CHF with Echo 2017 showing EF of 60-65%  4. YES - ARE YOUR TROUBLED BY SHORTNESS OF BREATH WHEN WALKING ON THE LEVEL, UP A SLIGHT HILL OR AT NIGHT? H/o COPD. CHF, SOB when walking up a hill or long distances  5. NO - Do you currently have a cold, bronchitis or other respiratory infection?  6. YES - DO YOU HAVE A COUGH, SHORTNESS OF BREATH OR WHEEZING? Chronic occasional cough d/t COPD, at baseline  7. NO - Do you sometimes get pains in the calves of your legs when you walk?  8. NO - Do you or anyone in your family have previous history of blood clots?  9. YES - DO YOU OR DOES ANYONE IN YOUR FAMILY HAVE A SERIOUS BLEEDING PROBLEM  SUCH AS PROLONGED BLEEDING FOLLOWING SURGERIES OR CUTS? Mother had bleeding problems, patient denies any h/o clots or inappropriate bleeding  10. YES - HAVE YOU EVER HAD PROBLEMS WITH ANEMIA OR BEEN TOLD TO TAKE IRON PILLS? On vitamin B supplement for chronic anemia.   11. NO - Have you had any abnormal blood loss such as black, tarry or bloody stools, or abnormal vaginal bleeding?  12. NO - Have you ever had a blood transfusion?  13. NO - Have you or any of your relatives ever had problems with anesthesia?  14. NO - Do you have sleep apnea, excessive snoring or daytime drowsiness?  15. NO - Do you have any prosthetic heart valves?  16. YES - DO YOU HAVE PROSTHETIC JOINTS? Left hip joint prosthesis  17. NO - Is there any chance that you may be pregnant?      HPI:     HPI related to upcoming procedure:   Jazmin Clifton is a 85 y.o. Female with PMH of CKD, CHF, COPD, previous laminectomy, dementia and falls with left metatarsal fracture who was admitted to the hospital on 10/30/18 with back pain . MRI lumbar spine revealed moderate to marked canal stenosis at L1-L2 and neurosurgery was consulted. She was started on medrol, zinc and vitamin C. Neurosurgery recommended L1-2 decompression. Surgery was planned for early November, however was delayed and patient developed seroma and neurosurgery wanted clearance from vascular surgery prior to lumbar decompression. She was cleared for surgery in early December, however then need prior authorization prior to undergoing surgery, which is now scheduled for 1/9/2018.    ANEMIA - Patient has a recent history of moderate-severe anemia, which has not been symptomatic - has been chronic and stable. Work up to date has revealed Hgb 11.1, chronic and. Treatment has been Vitmain B supplementation, .                                                                                                                                                                                                                                                                                           .  CHF - Patient has a longstanding history of moderate-severe CHF. Exacerbating conditions include hypertension and COPD. Currently the patient's condition is same. Current treatment regimen includes long acting nitrate, calcium channel blocker, ASA, diuretic (ACE-inhibitor and spironolactone discontinued d/t hyperkalemia). The patient denies chest pain, edema, orthopnea, SOB or recent weight gain. Last Echocardiogram 7/2017; EF 60-65%, normal LVH, EKG NSR, no changed from previous EKG.                                                                                                                                                                               .  COPD - Patient has a longstanding history of moderate-severe COPD . Patient has been doing well overall noting LACEY and EDEMA and continues on medication regimen consisting of Breo Ellipta, levalbuterol PRN (Anora Ellipta discontinued on return to previous, and patient has not taked in 3+ weeks, declines to resume, notes no changes with breathing since discontinuation) without adverse reactions or side effects.                                                                                                         .  DEPRESSION - Patient has a long history of Depression of moderate severity requiring medication for control with recent symptoms being stable..Current symptoms of depression include none.                                                                                                                                                                                    .  HYPERLIPIDEMIA - Patient has a long history of significant Hyperlipidemia requiring medication for treatment with recent good control. Patient reports no problems or side effects with the medication.                                                                                                                                                        .  HYPERTENSION - Patient has longstanding history of HTN , currently denies any symptoms referable to elevated blood pressure. Specifically denies chest pain, palpitations, dyspnea, orthopnea, PND or peripheral edema. Blood pressure readings typically controlled, elevated at times likely d/t pain. Current medication regimen is as listed below. Patient denies any side effects of medication.                                                                                                                                                                                          .  RENAL INSUFFICIENCY - Patient has a longstanding history of moderate-severe chronic renal insufficiency. Last Cr 1.31 (baseline 1.1-1.4).                                                                                                                                                                               .    MEDICAL HISTORY:     Patient Active Problem List    Diagnosis Date Noted     Morbid obesity (H) 06/19/2018     Priority: Medium     Mild cognitive impairment 09/22/2017     Priority: Medium     S/P carotid endarterectomy 09/06/2017     Priority: Medium     Carotid stenosis, symptomatic w/o infarct, right 08/31/2017     Priority: Medium     Thyroid nodule 08/23/2017     Priority: Medium     Trigger point of extremity 08/23/2017     Priority: Medium     Trochanteric bursitis of right hip 08/23/2017     Priority: Medium     Urinary tract infection without hematuria, site unspecified 08/23/2017     Priority: Medium     CKD (chronic kidney disease) stage 3, GFR 30-59 ml/min (H) 08/16/2017     Priority: Medium     ACP (advance care planning) 08/03/2017     Priority: Medium     8/3/2017  Recommend Code Status in chart and patient's Advance Care Planning documents be reviewed to ensure alignment with patient's current wishes.  Most recent Advance Care Planning document is a POLST dated  7/24/17 indicating DNI.  Agnieszka Santillan,  Advance Care Planning Liaison           Transient cerebral ischemia, unspecified type 08/01/2017     Priority: Medium     Carotid artery stenosis, symptomatic, right 08/01/2017     Priority: Medium     CVA (cerebral vascular accident) (H) 07/26/2017     Priority: Medium     Chronic obstructive pulmonary disease, unspecified COPD type (H) 07/25/2017     Priority: Medium     Generalized muscle weakness 07/25/2017     Priority: Medium     Dizziness 07/25/2017     Priority: Medium     Osteoarthritis of right hip, unspecified osteoarthritis type 07/25/2017     Priority: Medium     Hypotension, unspecified hypotension type 07/25/2017     Priority: Medium     Hip joint replacement status 04/18/2012     Priority: Medium     Osteoarthritis 04/18/2012     Priority: Medium     DDD (degenerative disc disease), lumbar 04/18/2012     Priority: Medium     Mild major depression (H) 04/18/2012     Priority: Medium     Incontinence of urine 04/18/2012     Priority: Medium     Osteoporosis 10/25/2011     Priority: Medium     S/P laminectomy 10/25/2011     Priority: Medium     CARDIOVASCULAR SCREENING; LDL GOAL LESS THAN 100 10/31/2010     Priority: Medium     CHF (congestive heart failure) (H) 09/17/2008     Priority: Medium     Diastolic disfunction - ECHP 2008       Restrictive lung disease 09/17/2008     Priority: Medium     PFT 4/2008       Renovascular hypertension 11/09/2006     Priority: Medium     Problem list name updated by automated process. Provider to review       Benign neoplasm of colon 10/04/2006     Priority: Medium     Angiodysplasia - due for repeat 10 years.  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 NO edema. Continue current Therapy.HYPERTENSION: Dynacirc CR 10mg daily initiated today. Will continue adjusting blood pressure medicatins as needed until readings at target.VITAMIN D  DEFICIENCY - Completed therapy, discontinue ergocalciferol.ANEMIA, EPO DEFICIENCY - Hgb 10.2. Not on EPO. Continue observation for now. Recnet Hgb drop due to lumbar laminectomy.  Problem list name updated by automated process. Provider to review       Essential hypertension, benign 05/01/2006     Priority: Medium     Atherosclerosis of renal artery (H)      Priority: Medium     Left renal artery stenosis       Esophageal reflux      Priority: Medium     Gastroesophageal Reflux Disease       Cerebral aneurysm, nonruptured      Priority: Medium     Cerebral Aneurysm - unchanged on rcent MRI.  Seen by neurosurg.  [place] she should have follow up MRA every 2 years (due 2010)       Other specified cardiac dysrhythmias(427.89)      Priority: Medium     Bradycardia, improved on lower dose beta blockers         Past Medical History:   Diagnosis Date     ABDOMINAL PAIN GENERALIZED 3/15/2006    1 month of abdominal pain that llqwhich radiates into her back and chest.  Pt was hospitilzed 2x for the pain at [facility] --pt hopsitized for 3 days.  Rcords not ab=vailable.  She was told either \" twisted intestine or blockage of bowel.  Pt feels it is the hiatal hernia.  Pt hospitilized again a second time  A month ago.  Ct scans x 2 showed \" nothing.\"  Pt had a barium and xrays.  Pt sa     Abdominal pain, generalized 3/15/2006    1 month of abdominal pain that llqwhich radiates into her back and chest.  Pt was hospitilzed 2x for the pain at [facility] --pt hopsitized for 3 days.  Rcords not ab=vailable.  She was told either \" twisted intestine or blockage of bowel.  Pt feels it is the hiatal hernia.  Pt hospitilized again a second time  A " "month ago.  Ct scans x 2 showed \" nothing.\"  Pt had a barium and xrays.  Pt sa     Atherosclerosis of renal artery (H)     Left renal artery stenosis     BENIGN HYPERTENSION 5/1/2006 5/1/2006   Increase catapres to 0.3 mg and decrease clonidine to 0.1 mg daily.  Recheck bp in 1 month.      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     Dyspepsia     Generalized osteoarthrosis, unspecified site     DJD-chronic back pain     Herpes zoster dermatitis of eyelid      Insomnia, unspecified      Lumbago     Chronic back pain     Other chest pain     Atypical Chest Pain     Other specified cardiac dysrhythmias(427.89)     Bradycardia, improved on lower dose beta blockers      Rectocele     Grade 3     Unspecified disorder of kidney and ureter     Renal insufficiency     Unspecified essential hypertension      Past Surgical History:   Procedure Laterality Date     CHOLECYSTECTOMY, LAPOROSCOPIC  1997    Cholecystectomy, Laparoscopic     ENDARTERECTOMY CAROTID Right 8/31/2017    Procedure: ENDARTERECTOMY CAROTID;  RIGHT CAROTID ENDARTERECTOMY WITH EEG;  Surgeon: Hilario Parry MD;  Location:  OR     HYSTERECTOMY, RAYMOND  1982    oophorectomy,RAYMOND     SURGICAL HISTORY OF -       Laminectomy x 3     SURGICAL HISTORY OF -       MCA Aneurysm repair     SURGICAL HISTORY OF -   1996    Bladder suspension (Bladder Repair x2)     SURGICAL HISTORY OF -       Bilateral Hand Surgeries for Arthritis x2     SURGICAL HISTORY OF -       Repair of a Cerebral Aneurysm     Current Outpatient Medications   Medication Sig Dispense Refill     acetaminophen (TYLENOL) 325 MG tablet Take 650 mg by mouth every 4 hours as needed for mild pain       " Alendronate Sodium (FOSAMAX PO) Take 10 mg by mouth       Ascorbic Acid (VITAMIN C PO) Take 500 mg by mouth daily       aspirin 81 MG chewable tablet Take 81 mg by mouth       B COMPLEX-C-E-ZN PO Take 1 tablet by mouth daily       CALCIUM 600+D 600-200 MG-UNIT TABS TAKE 1 TAB BY MOUTH ONCE DAILY 30 tablet 11     calcium carbonate (TUMS) 500 MG chewable tablet Take 1 chew tab by mouth every hour as needed for heartburn       DONEPEZIL HCL PO Take 5 mg by mouth daily       DULoxetine HCl (CYMBALTA PO) Take 30 mg by mouth daily       fluticasone (FLONASE) 50 MCG/ACT spray Spray 1 spray into both nostrils 2 times daily as needed        fluticasone-vilanterol (BREO ELLIPTA) 100-25 MCG/INH oral inhaler Inhale 1 puff into the lungs daily       Furosemide (LASIX PO) Take 40 mg by mouth daily And 20mg q afternoon.       FUROSEMIDE PO Take 20 mg by mouth daily as needed for weight gain daily for >2 pound weight gain in one day       GABAPENTIN PO Take 100 mg by mouth every morning       GABAPENTIN PO Take 200 mg by mouth At Bedtime        guaiFENesin (ROBITUSSIN) 100 MG/5ML SYRP Take 10 mLs by mouth every 4 hours as needed for cough        HYDROcodone-acetaminophen (NORCO) 5-325 MG tablet Take 1 tablet by mouth 3 times daily       HYDROcodone-acetaminophen (NORCO) 5-325 MG tablet Take 1 tablet by mouth every 4 hours as needed for severe pain       hydrocortisone 1 % CREA cream Apply topically every 6 hours as needed for itching       levalbuterol (XOPENEX) 1.25 MG/3ML neb solution Take 1 ampule by nebulization every 4 hours as needed for shortness of breath / dyspnea or wheezing       loperamide (IMODIUM) 2 MG capsule Take 2 mg by mouth 4 times daily as needed for diarrhea       magnesium hydroxide (MILK OF MAGNESIA) 400 MG/5ML suspension Take 30 mLs by mouth daily as needed for constipation or heartburn       MAGNESIUM OXIDE PO Take 250 mg by mouth daily       Menthol, Topical Analgesic, (BIOFREEZE) 4 % GEL Externally apply  topically daily And Three times a day PRN       methylPREDNISolone (MEDROL DOSEPAK) 4 MG tablet Take 4 mg by mouth daily follow package directions       nystatin (MYCOSTATIN) 069814 UNIT/GM POWD Apply topically 2 times daily as needed        OMEPRAZOLE PO Take 40 mg by mouth every morning       Ondansetron (ZOFRAN ODT PO) Take 4 mg by mouth every 8 hours as needed for nausea       polyethylene glycol (MIRALAX/GLYCOLAX) Packet Take 17 g by mouth daily as needed        polyvinyl alcohol (LIQUIFILM TEARS) 1.4 % ophthalmic solution Place 1 drop into both eyes 2 times daily And 4 times a day PRN       SIMETHICONE-80 PO Take 160 mg by mouth 4 times daily as needed for intestinal gas        atorvastatin (LIPITOR) 40 MG tablet TAKE 1 TABLET BY MOUTH ONCE DAILY 31 tablet 98     donepezil (ARICEPT) 5 MG tablet TAKE 1 TABLET BY MOUTH ONCE DAILY 31 tablet 98     DULoxetine (CYMBALTA) 30 MG capsule TAKE 1 CAPSULE BY MOUTH ONCE DAILY 31 capsule 98     furosemide (LASIX) 20 MG tablet TAKE 1 TABLET BY MOUTH ONCE DAILY IN THE AFTERNOON 31 tablet 98     furosemide (LASIX) 40 MG tablet TAKE 1 TABLET BY MOUTH EVERY MORNING 31 tablet 98     gabapentin (NEURONTIN) 100 MG capsule TAKE 1 CAPSULE BY MOUTH EVERY MORNING;AND TAKE TWO CAPSULES (200MG) BY MOUTH AT BEDTIME 93 capsule 98     isosorbide mononitrate (IMDUR) 30 MG 24 hr tablet TAKE 1 TABLET BY MOUTH ONCE DAILY 31 tablet 98     isradipine (DYNACIRC) 5 MG capsule TAKE 1 CAPSULE BY MOUTH TWICE DAILY 62 capsule 98     memantine (NAMENDA) 5 MG tablet TAKE 1 TABLET BY MOUTH ONCE DAILY 31 tablet 98     methylPREDNISolone (MEDROL) 4 MG tablet TAKE 1 TABLET BY MOUTH ONCE DAILY 31 tablet 98     omeprazole (PRILOSEC) 40 MG DR capsule TAKE 1 CAPSULE BY MOUTH ONCE DAILY 31 capsule 98     STOOL SOFTENER/LAXATIVE 50-8.6 MG tablet TAKE 1 TABLET BY MOUTH TWICE DAILY SCHEDULED;AND GIVE 1 TABLET BY MOUTH EVERY 24 HOURS AS NEEDED FOR CONSTIPATION. HOLD FOR LOOSE STOOL 62 tablet 98     OTC products:  None, except as noted above    Allergies   Allergen Reactions     Accupril      Ace Inhibitors Unknown     Accupril     Augmentin Unknown     Levofloxacin Unknown     Macrobid [Nitrofurantoin]      Morphine Other (See Comments)     hallucinations     Norvasc [Amlodipine Besylate]      Leg swelling       Quinapril       Latex Allergy: NO    Social History     Tobacco Use     Smoking status: Former Smoker     Last attempt to quit: 1992     Years since quittin.0   Substance Use Topics     Alcohol use: Yes     Comment: wine occas.     History   Drug Use No       REVIEW OF SYSTEMS:   10 point ROS of systems including Constitutional, Eyes, Respiratory, Cardiovascular, Gastroenterology, Genitourinary, Integumentary, Musculoskeletal, Psychiatric were all negative except for pertinent positives noted in my HPI.    EXAM:   BP (!) 138/92   Pulse 66   Temp 98  F (36.7  C)   Resp 18   Wt 88.2 kg (194 lb 6.4 oz)   SpO2 93%   BMI 34.44 kg/m     GENERAL APPEARANCE:  Alert, in no distress, oriented, morbidly obese, cooperative  RESP:  respiratory effort and palpation of chest normal, lungs clear to auscultation , no respiratory distress, on RA, no cough noted  CV:  Palpation and auscultation of heart done , regular rate and rhythm, no murmur, rub, or gallop, +2 pedal pulses, peripheral edema 2-3+ in bilat ankles  ABDOMEN:  normal bowel sounds, soft, nontender, no hepatosplenomegaly or other masses, large, round, soft, no guarding or rebound  M/S:   PETERSEN purposefully, mild tenderness to lumbar spine, small steps with walker, ambulates onyl short distances, no gross joint deformities  SKIN:  Inspection of skin and subcutaneous tissue baseline, Palpation of skin and subcutaneous tissue baseline, palpable seroma to right groin/upper thigh, nontender, stable in size  NEURO:   Cranial nerves 2-12 are normal tested and grossly at patient's baseline  PSYCH:  oriented X 3, normal insight, judgement and memory, affect and mood  normal    DIAGNOSTICS:     EKG: appears normal, NSR, normal axis, normal intervals, no acute ST/T changes c/w ischemia, mild LVH by voltage criteria, unchanged from previous tracings  Hemoglobin (indicated for history of anemia or procedure with significant blood loss such as tonsillectomy, major intraperitoneal surgery, vascular surgery, major spine surgery, total joint replacement)  Serum Potassium  Serum Creatinine  Labs Drawn and in Process:   Unresulted Labs Ordered in the Past 30 Days of this Admission     No orders found from 10/29/2018 to 12/29/2018.          Recent Labs   Lab Test 12/11/18  0805 12/03/18  0755  11/23/18  1042 11/14/18  0802  08/31/17  1113  07/26/17  1130   HGB  --   --   --  11.1* 9.8*   < >  --    < > 11.0*   PLT  --   --   --  202 193   < > 172   < > 156   INR  --   --   --   --   --   --   --   --  1.07    140   < > 139  --    < > 141   < > 137   POTASSIUM 4.3 4.4   < > 5.8*  --    < > 5.3   < > 4.0   CR 1.31* 1.68*   < > 1.65*  --    < > 1.42*   < > 1.50*   A1C  --   --   --   --   --   --  6.2*  --   --     < > = values in this interval not displayed.        IMPRESSION:   Reason for surgery/procedure: lumbar spinal stenosis  Diagnosis/reason for consult: lumbar spinal stenosis    The proposed surgical procedure is considered INTERMEDIATE risk.    REVISED CARDIAC RISK INDEX  The patient has the following serious cardiovascular risks for perioperative complications such as (MI, PE, VFib and 3  AV Block):  Congestive Heart Failure (pulmonary edema, PND, s3 holli, CXR with pulmonary congestion, basilar rales)  Cerebrovascular Disease (TIA or CVA)  INTERPRETATION: 2 risks: Class III (moderate risk - 6.6% complication rate)    The patient has the following additional risks for perioperative complications:  No identified additional risks  Delirium or Dementia  Morbid obesity      ICD-10-CM    1. Spinal stenosis of lumbar region, unspecified whether neurogenic claudication present  M48.061    2. CKD (chronic kidney disease) stage 3, GFR 30-59 ml/min (H) N18.3    3. Congestive heart failure, unspecified HF chronicity, unspecified heart failure type (H) I50.9    4. Benign essential hypertension I10    5. Chronic obstructive pulmonary disease, unspecified COPD type (H) J44.9    6. H/O: CVA (cerebrovascular accident) Z86.73    7. Dementia without behavioral disturbance, unspecified dementia type F03.90    8. Gastroesophageal reflux disease without esophagitis K21.9        ORDERS:     --Consult hospital rounder / IM to assist post-op medical management    Cardiovascular Risk  Beta blockers contraindicated due to history of bradycardia        Pulmonary Risk  Incentive spirometry post op  Respiratory Therapy (Respiratory Care IP Consult)  post op  Maximize COPD treatment - respiratory status stable on current medication,will discontinue Anoro Ellipta as not taking and patient declines to resume       Anemia  Anemia and does not require treatment prior to surgery.  Monitor Hemoglobin postoperatively.      --Patient is to take all scheduled medications on the day of surgery EXCEPT for modifications listed below.    Anticoagulant or Antiplatelet Medication Use  ASPIRIN: Discontinue ASA 7-10 days prior to procedure to reduce bleeding risk.  It should be resumed post-operatively.        Chronic Corticosteroid Use  Stress dose steroids are indicated due to chronic steroid use in last 3 months (e.g. >3 weeks of predisone 20 mg or daily prednisone 5 mg)      APPROVAL GIVEN to proceed with proposed procedure, without further diagnostic evaluation    Orders:  1. discontinue Cyclobenzaprine, Calmoseptine, cough lozenges  2. discontinue Anoro elipta inhaler per patient request  3. discontinue spironolactone (discontinued in TCU d/t hyperkalemia)  4. BMP, Hgb on next lab day   5. Change miralax to 17 gm PO every day PRN   6. DC zinc (facilty able to supply order vitamin B/zinc supplement not available in TCU)  7.  EKG re CHF, Pre op clearance  8. Hold Aspirin starting 1/2/18  9. On morning of surgery hold senna, calcium, vitamin B, Vitamin C           Signed Electronically by: MARY Gómez CNP    Copy of this evaluation report is provided to requesting physician.    [facility] Preop Guidelines    Revised Cardiac Risk Index    Junie finch APRN CNP

## 2018-12-31 ENCOUNTER — COMMUNICATION - HEALTHEAST (OUTPATIENT)
Dept: RESPIRATORY THERAPY | Facility: HOSPITAL | Age: 83
End: 2018-12-31

## 2019-01-01 ENCOUNTER — APPOINTMENT (OUTPATIENT)
Dept: GENERAL RADIOLOGY | Facility: CLINIC | Age: 84
DRG: 683 | End: 2019-01-01
Attending: FAMILY MEDICINE
Payer: COMMERCIAL

## 2019-01-01 ENCOUNTER — APPOINTMENT (OUTPATIENT)
Dept: GENERAL RADIOLOGY | Facility: CLINIC | Age: 84
DRG: 190 | End: 2019-01-01
Attending: NURSE PRACTITIONER
Payer: COMMERCIAL

## 2019-01-01 ENCOUNTER — APPOINTMENT (OUTPATIENT)
Dept: PHYSICAL THERAPY | Facility: CLINIC | Age: 84
DRG: 190 | End: 2019-01-01
Payer: COMMERCIAL

## 2019-01-01 ENCOUNTER — HOSPITAL ENCOUNTER (EMERGENCY)
Facility: CLINIC | Age: 84
Discharge: ANOTHER HOSPITAL | End: 2019-11-15
Attending: FAMILY MEDICINE | Admitting: FAMILY MEDICINE
Payer: COMMERCIAL

## 2019-01-01 ENCOUNTER — APPOINTMENT (OUTPATIENT)
Dept: CT IMAGING | Facility: CLINIC | Age: 84
End: 2019-01-01
Attending: FAMILY MEDICINE
Payer: COMMERCIAL

## 2019-01-01 ENCOUNTER — RADIOLOGY INJECTION OFFICE VISIT (OUTPATIENT)
Dept: PALLIATIVE MEDICINE | Facility: CLINIC | Age: 84
End: 2019-01-01
Payer: COMMERCIAL

## 2019-01-01 ENCOUNTER — APPOINTMENT (OUTPATIENT)
Dept: CT IMAGING | Facility: CLINIC | Age: 84
DRG: 683 | End: 2019-01-01
Attending: FAMILY MEDICINE
Payer: COMMERCIAL

## 2019-01-01 ENCOUNTER — NURSING HOME VISIT (OUTPATIENT)
Dept: GERIATRICS | Facility: CLINIC | Age: 84
End: 2019-01-01
Payer: COMMERCIAL

## 2019-01-01 ENCOUNTER — APPOINTMENT (OUTPATIENT)
Dept: OCCUPATIONAL THERAPY | Facility: CLINIC | Age: 84
DRG: 683 | End: 2019-01-01
Payer: COMMERCIAL

## 2019-01-01 ENCOUNTER — OFFICE VISIT (OUTPATIENT)
Dept: PALLIATIVE MEDICINE | Facility: CLINIC | Age: 84
End: 2019-01-01
Payer: COMMERCIAL

## 2019-01-01 ENCOUNTER — APPOINTMENT (OUTPATIENT)
Dept: PHYSICAL THERAPY | Facility: CLINIC | Age: 84
DRG: 683 | End: 2019-01-01
Payer: COMMERCIAL

## 2019-01-01 ENCOUNTER — ASSISTED LIVING VISIT (OUTPATIENT)
Dept: GERIATRICS | Facility: CLINIC | Age: 84
End: 2019-01-01
Payer: COMMERCIAL

## 2019-01-01 ENCOUNTER — PATIENT OUTREACH (OUTPATIENT)
Dept: GERIATRIC MEDICINE | Facility: CLINIC | Age: 84
End: 2019-01-01

## 2019-01-01 ENCOUNTER — APPOINTMENT (OUTPATIENT)
Dept: GENERAL RADIOLOGY | Facility: CLINIC | Age: 84
DRG: 683 | End: 2019-01-01
Attending: INTERNAL MEDICINE
Payer: COMMERCIAL

## 2019-01-01 ENCOUNTER — HOSPITAL LABORATORY (OUTPATIENT)
Facility: OTHER | Age: 84
End: 2019-01-01

## 2019-01-01 ENCOUNTER — HOSPITAL ENCOUNTER (OUTPATIENT)
Dept: RADIOLOGY | Facility: CLINIC | Age: 84
Discharge: HOME | End: 2019-11-14
Attending: ANESTHESIOLOGY | Admitting: ANESTHESIOLOGY
Payer: COMMERCIAL

## 2019-01-01 ENCOUNTER — DISCHARGE SUMMARY NURSING HOME (OUTPATIENT)
Dept: GERIATRICS | Facility: CLINIC | Age: 84
End: 2019-01-01
Payer: COMMERCIAL

## 2019-01-01 ENCOUNTER — HOSPITAL ENCOUNTER (INPATIENT)
Facility: CLINIC | Age: 84
LOS: 2 days | Discharge: NURSING, REHAB OR LONG-TERM CARE | DRG: 683 | End: 2019-11-06
Attending: FAMILY MEDICINE | Admitting: FAMILY MEDICINE
Payer: COMMERCIAL

## 2019-01-01 ENCOUNTER — APPOINTMENT (OUTPATIENT)
Dept: GENERAL RADIOLOGY | Facility: CLINIC | Age: 84
End: 2019-01-01
Attending: FAMILY MEDICINE
Payer: COMMERCIAL

## 2019-01-01 ENCOUNTER — APPOINTMENT (OUTPATIENT)
Dept: OCCUPATIONAL THERAPY | Facility: CLINIC | Age: 84
DRG: 190 | End: 2019-01-01
Payer: COMMERCIAL

## 2019-01-01 ENCOUNTER — TELEPHONE (OUTPATIENT)
Dept: GERIATRICS | Facility: CLINIC | Age: 84
End: 2019-01-01

## 2019-01-01 ENCOUNTER — HOSPITAL ENCOUNTER (INPATIENT)
Facility: CLINIC | Age: 84
LOS: 3 days | Discharge: HOME HEALTH | DRG: 190 | End: 2019-12-29
Attending: NURSE PRACTITIONER | Admitting: INTERNAL MEDICINE
Payer: COMMERCIAL

## 2019-01-01 VITALS
RESPIRATION RATE: 14 BRPM | SYSTOLIC BLOOD PRESSURE: 152 MMHG | BODY MASS INDEX: 34.2 KG/M2 | HEIGHT: 63 IN | WEIGHT: 193 LBS | TEMPERATURE: 99 F | OXYGEN SATURATION: 92 % | DIASTOLIC BLOOD PRESSURE: 77 MMHG | HEART RATE: 75 BPM

## 2019-01-01 VITALS
OXYGEN SATURATION: 94 % | BODY MASS INDEX: 34.33 KG/M2 | RESPIRATION RATE: 18 BRPM | WEIGHT: 193.8 LBS | SYSTOLIC BLOOD PRESSURE: 146 MMHG | TEMPERATURE: 98 F | DIASTOLIC BLOOD PRESSURE: 79 MMHG | HEART RATE: 88 BPM

## 2019-01-01 VITALS
OXYGEN SATURATION: 94 % | BODY MASS INDEX: 33.83 KG/M2 | RESPIRATION RATE: 16 BRPM | TEMPERATURE: 97.3 F | SYSTOLIC BLOOD PRESSURE: 149 MMHG | WEIGHT: 191 LBS | DIASTOLIC BLOOD PRESSURE: 51 MMHG | HEART RATE: 78 BPM

## 2019-01-01 VITALS
HEART RATE: 74 BPM | DIASTOLIC BLOOD PRESSURE: 69 MMHG | WEIGHT: 191 LBS | RESPIRATION RATE: 20 BRPM | OXYGEN SATURATION: 94 % | SYSTOLIC BLOOD PRESSURE: 147 MMHG | BODY MASS INDEX: 33.84 KG/M2 | HEIGHT: 63 IN | TEMPERATURE: 97.4 F

## 2019-01-01 VITALS
DIASTOLIC BLOOD PRESSURE: 72 MMHG | BODY MASS INDEX: 33.31 KG/M2 | WEIGHT: 188 LBS | RESPIRATION RATE: 16 BRPM | SYSTOLIC BLOOD PRESSURE: 182 MMHG | HEART RATE: 66 BPM | OXYGEN SATURATION: 95 % | TEMPERATURE: 97.9 F | HEIGHT: 63 IN

## 2019-01-01 VITALS
HEIGHT: 63 IN | DIASTOLIC BLOOD PRESSURE: 73 MMHG | WEIGHT: 187 LBS | OXYGEN SATURATION: 94 % | SYSTOLIC BLOOD PRESSURE: 162 MMHG | HEART RATE: 72 BPM | BODY MASS INDEX: 33.13 KG/M2 | RESPIRATION RATE: 18 BRPM | TEMPERATURE: 98.2 F

## 2019-01-01 VITALS
DIASTOLIC BLOOD PRESSURE: 65 MMHG | BODY MASS INDEX: 29.73 KG/M2 | WEIGHT: 184.97 LBS | TEMPERATURE: 98 F | HEART RATE: 73 BPM | OXYGEN SATURATION: 95 % | SYSTOLIC BLOOD PRESSURE: 144 MMHG | HEIGHT: 66 IN | RESPIRATION RATE: 18 BRPM

## 2019-01-01 VITALS
HEIGHT: 63 IN | RESPIRATION RATE: 16 BRPM | BODY MASS INDEX: 34.41 KG/M2 | SYSTOLIC BLOOD PRESSURE: 162 MMHG | OXYGEN SATURATION: 97 % | WEIGHT: 194.22 LBS | HEART RATE: 76 BPM | DIASTOLIC BLOOD PRESSURE: 70 MMHG | TEMPERATURE: 98.2 F

## 2019-01-01 VITALS
DIASTOLIC BLOOD PRESSURE: 74 MMHG | HEIGHT: 63 IN | TEMPERATURE: 98.3 F | HEART RATE: 69 BPM | OXYGEN SATURATION: 94 % | BODY MASS INDEX: 33.13 KG/M2 | RESPIRATION RATE: 18 BRPM | SYSTOLIC BLOOD PRESSURE: 139 MMHG | WEIGHT: 187 LBS

## 2019-01-01 VITALS
BODY MASS INDEX: 33.31 KG/M2 | DIASTOLIC BLOOD PRESSURE: 93 MMHG | HEART RATE: 74 BPM | SYSTOLIC BLOOD PRESSURE: 187 MMHG | HEIGHT: 63 IN | TEMPERATURE: 97.9 F | WEIGHT: 188 LBS | OXYGEN SATURATION: 96 % | RESPIRATION RATE: 18 BRPM

## 2019-01-01 VITALS — SYSTOLIC BLOOD PRESSURE: 161 MMHG | DIASTOLIC BLOOD PRESSURE: 73 MMHG | HEART RATE: 77 BPM

## 2019-01-01 VITALS — RESPIRATION RATE: 16 BRPM | HEART RATE: 84 BPM | SYSTOLIC BLOOD PRESSURE: 146 MMHG | DIASTOLIC BLOOD PRESSURE: 86 MMHG

## 2019-01-01 DIAGNOSIS — R53.81 PHYSICAL DECONDITIONING: ICD-10-CM

## 2019-01-01 DIAGNOSIS — M51.369 DDD (DEGENERATIVE DISC DISEASE), LUMBAR: ICD-10-CM

## 2019-01-01 DIAGNOSIS — D50.9 IRON DEFICIENCY ANEMIA, UNSPECIFIED IRON DEFICIENCY ANEMIA TYPE: ICD-10-CM

## 2019-01-01 DIAGNOSIS — I25.10 CORONARY ARTERY DISEASE INVOLVING NATIVE CORONARY ARTERY OF NATIVE HEART WITHOUT ANGINA PECTORIS: ICD-10-CM

## 2019-01-01 DIAGNOSIS — N18.30 CKD (CHRONIC KIDNEY DISEASE) STAGE 3, GFR 30-59 ML/MIN (H): ICD-10-CM

## 2019-01-01 DIAGNOSIS — N18.4 ANEMIA OF CHRONIC RENAL FAILURE, STAGE 4 (SEVERE) (H): ICD-10-CM

## 2019-01-01 DIAGNOSIS — I50.32 CHRONIC DIASTOLIC CONGESTIVE HEART FAILURE (H): ICD-10-CM

## 2019-01-01 DIAGNOSIS — S32.592D CLOSED FRACTURE OF RAMUS OF LEFT PUBIS WITH ROUTINE HEALING, SUBSEQUENT ENCOUNTER: ICD-10-CM

## 2019-01-01 DIAGNOSIS — G89.4 CHRONIC PAIN SYNDROME: ICD-10-CM

## 2019-01-01 DIAGNOSIS — J44.9 CHRONIC OBSTRUCTIVE PULMONARY DISEASE, UNSPECIFIED COPD TYPE (H): ICD-10-CM

## 2019-01-01 DIAGNOSIS — S32.592D CLOSED FRACTURE OF RAMUS OF LEFT PUBIS WITH ROUTINE HEALING, SUBSEQUENT ENCOUNTER: Primary | ICD-10-CM

## 2019-01-01 DIAGNOSIS — R09.81 NASAL CONGESTION: ICD-10-CM

## 2019-01-01 DIAGNOSIS — G89.29 CHRONIC RIGHT HIP PAIN: ICD-10-CM

## 2019-01-01 DIAGNOSIS — E78.5 HYPERLIPIDEMIA, UNSPECIFIED HYPERLIPIDEMIA TYPE: ICD-10-CM

## 2019-01-01 DIAGNOSIS — G89.29 CHRONIC LEFT SHOULDER PAIN: Primary | ICD-10-CM

## 2019-01-01 DIAGNOSIS — I10 ESSENTIAL HYPERTENSION: ICD-10-CM

## 2019-01-01 DIAGNOSIS — G30.1 LATE ONSET ALZHEIMER'S DISEASE WITHOUT BEHAVIORAL DISTURBANCE (H): ICD-10-CM

## 2019-01-01 DIAGNOSIS — R26.89 IMPAIRED GAIT AND MOBILITY: ICD-10-CM

## 2019-01-01 DIAGNOSIS — F02.80 LATE ONSET ALZHEIMER'S DISEASE WITHOUT BEHAVIORAL DISTURBANCE (H): ICD-10-CM

## 2019-01-01 DIAGNOSIS — D64.9 ANEMIA, UNSPECIFIED TYPE: ICD-10-CM

## 2019-01-01 DIAGNOSIS — M25.512 CHRONIC LEFT SHOULDER PAIN: ICD-10-CM

## 2019-01-01 DIAGNOSIS — J44.9 CHRONIC OBSTRUCTIVE PULMONARY DISEASE, UNSPECIFIED COPD TYPE (H): Chronic | ICD-10-CM

## 2019-01-01 DIAGNOSIS — J18.9 PNEUMONIA DUE TO INFECTIOUS ORGANISM, UNSPECIFIED LATERALITY, UNSPECIFIED PART OF LUNG: Primary | ICD-10-CM

## 2019-01-01 DIAGNOSIS — M15.0 PRIMARY OSTEOARTHRITIS INVOLVING MULTIPLE JOINTS: Primary | ICD-10-CM

## 2019-01-01 DIAGNOSIS — M25.512 CHRONIC LEFT SHOULDER PAIN: Primary | ICD-10-CM

## 2019-01-01 DIAGNOSIS — J44.1 COPD EXACERBATION (H): ICD-10-CM

## 2019-01-01 DIAGNOSIS — S32.512D CLOSED FRACTURE OF SUPERIOR RAMUS OF LEFT PUBIS WITH ROUTINE HEALING, SUBSEQUENT ENCOUNTER: Primary | ICD-10-CM

## 2019-01-01 DIAGNOSIS — R07.89 ATYPICAL CHEST PAIN: ICD-10-CM

## 2019-01-01 DIAGNOSIS — J16.8 PNEUMONIA DUE TO OTHER SPECIFIED INFECTIOUS ORGANISMS: ICD-10-CM

## 2019-01-01 DIAGNOSIS — J18.9 PNEUMONIA OF LEFT LOWER LOBE DUE TO INFECTIOUS ORGANISM: ICD-10-CM

## 2019-01-01 DIAGNOSIS — M16.11 PRIMARY OSTEOARTHRITIS OF RIGHT HIP: ICD-10-CM

## 2019-01-01 DIAGNOSIS — F43.20 ANACLITIC DEPRESSION: ICD-10-CM

## 2019-01-01 DIAGNOSIS — M25.551 CHRONIC RIGHT HIP PAIN: ICD-10-CM

## 2019-01-01 DIAGNOSIS — S32.512D CLOSED FRACTURE OF SUPERIOR RAMUS OF LEFT PUBIS WITH ROUTINE HEALING, SUBSEQUENT ENCOUNTER: ICD-10-CM

## 2019-01-01 DIAGNOSIS — I89.0 LYMPHEDEMA: ICD-10-CM

## 2019-01-01 DIAGNOSIS — N30.00 ACUTE CYSTITIS WITHOUT HEMATURIA: Primary | ICD-10-CM
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
## 2019-01-01 DIAGNOSIS — K21.9 GASTROESOPHAGEAL REFLUX DISEASE WITHOUT ESOPHAGITIS: ICD-10-CM

## 2019-01-01 DIAGNOSIS — S32.592A CLOSED FRACTURE OF RAMUS OF LEFT PUBIS, INITIAL ENCOUNTER (H): ICD-10-CM

## 2019-01-01 DIAGNOSIS — F41.8 DEPRESSION WITH ANXIETY: ICD-10-CM

## 2019-01-01 DIAGNOSIS — I25.118 CORONARY ARTERY DISEASE OF NATIVE HEART WITH STABLE ANGINA PECTORIS, UNSPECIFIED VESSEL OR LESION TYPE (H): ICD-10-CM

## 2019-01-01 DIAGNOSIS — D63.1 ANEMIA OF CHRONIC RENAL FAILURE, STAGE 4 (SEVERE) (H): ICD-10-CM

## 2019-01-01 DIAGNOSIS — G89.29 CHRONIC LEFT SHOULDER PAIN: ICD-10-CM

## 2019-01-01 DIAGNOSIS — R09.02 HYPOXIA: ICD-10-CM

## 2019-01-01 DIAGNOSIS — N17.9 ACUTE RENAL FAILURE, UNSPECIFIED ACUTE RENAL FAILURE TYPE (H): ICD-10-CM

## 2019-01-01 DIAGNOSIS — K13.79 ORAL CAVITY PAIN: ICD-10-CM

## 2019-01-01 DIAGNOSIS — I10 ESSENTIAL HYPERTENSION, BENIGN: Chronic | ICD-10-CM
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
## 2019-01-01 DIAGNOSIS — M16.11 PRIMARY OSTEOARTHRITIS OF RIGHT HIP: Primary | ICD-10-CM

## 2019-01-01 DIAGNOSIS — M48.062 SPINAL STENOSIS OF LUMBAR REGION WITH NEUROGENIC CLAUDICATION: ICD-10-CM

## 2019-01-01 DIAGNOSIS — K59.01 SLOW TRANSIT CONSTIPATION: ICD-10-CM

## 2019-01-01 DIAGNOSIS — L30.4 INTERTRIGO: Primary | ICD-10-CM

## 2019-01-01 DIAGNOSIS — Z98.890 H/O LUMBOSACRAL SPINE SURGERY: ICD-10-CM

## 2019-01-01 DIAGNOSIS — F03.90 DEMENTIA WITHOUT BEHAVIORAL DISTURBANCE, UNSPECIFIED DEMENTIA TYPE: ICD-10-CM

## 2019-01-01 DIAGNOSIS — M81.0 AGE-RELATED OSTEOPOROSIS WITHOUT CURRENT PATHOLOGICAL FRACTURE: ICD-10-CM

## 2019-01-01 LAB
ALBUMIN SERPL-MCNC: 2.9 G/DL (ref 3.4–5)
ALBUMIN SERPL-MCNC: 3.1 G/DL (ref 3.4–5)
ALBUMIN UR-MCNC: 100 MG/DL
ALBUMIN UR-MCNC: NEGATIVE MG/DL
ALP SERPL-CCNC: 81 U/L (ref 40–150)
ALP SERPL-CCNC: 82 U/L (ref 40–150)
ALT SERPL W P-5'-P-CCNC: 15 U/L (ref 0–50)
ALT SERPL W P-5'-P-CCNC: 16 U/L (ref 0–50)
ANION GAP SERPL CALCULATED.3IONS-SCNC: 1 MMOL/L (ref 3–14)
ANION GAP SERPL CALCULATED.3IONS-SCNC: 2 MMOL/L (ref 3–14)
ANION GAP SERPL CALCULATED.3IONS-SCNC: 2 MMOL/L (ref 3–14)
ANION GAP SERPL CALCULATED.3IONS-SCNC: 3 MMOL/L (ref 3–14)
ANION GAP SERPL CALCULATED.3IONS-SCNC: 4 MMOL/L (ref 3–14)
ANION GAP SERPL CALCULATED.3IONS-SCNC: 5 MMOL/L (ref 3–14)
ANION GAP SERPL CALCULATED.3IONS-SCNC: 6 MMOL/L (ref 3–14)
ANION GAP SERPL CALCULATED.3IONS-SCNC: 6 MMOL/L (ref 3–14)
APPEARANCE UR: ABNORMAL
APPEARANCE UR: ABNORMAL
AST SERPL W P-5'-P-CCNC: 11 U/L (ref 0–45)
AST SERPL W P-5'-P-CCNC: 18 U/L (ref 0–45)
BACTERIA #/AREA URNS HPF: ABNORMAL /HPF
BACTERIA #/AREA URNS HPF: ABNORMAL /HPF
BACTERIA SPEC CULT: ABNORMAL
BACTERIA SPEC CULT: ABNORMAL
BASOPHILS # BLD AUTO: 0 10E9/L (ref 0–0.2)
BASOPHILS NFR BLD AUTO: 0.1 %
BASOPHILS NFR BLD AUTO: 0.1 %
BASOPHILS NFR BLD AUTO: 0.2 %
BASOPHILS NFR BLD AUTO: 0.4 %
BILIRUB SERPL-MCNC: 0.3 MG/DL (ref 0.2–1.3)
BILIRUB SERPL-MCNC: 0.3 MG/DL (ref 0.2–1.3)
BILIRUB UR QL STRIP: NEGATIVE
BILIRUB UR QL STRIP: NEGATIVE
BUN SERPL-MCNC: 24 MG/DL (ref 7–30)
BUN SERPL-MCNC: 24 MG/DL (ref 7–30)
BUN SERPL-MCNC: 26 MG/DL (ref 7–30)
BUN SERPL-MCNC: 28 MG/DL (ref 7–30)
BUN SERPL-MCNC: 28 MG/DL (ref 7–30)
BUN SERPL-MCNC: 31 MG/DL (ref 7–30)
BUN SERPL-MCNC: 32 MG/DL (ref 7–30)
BUN SERPL-MCNC: 33 MG/DL (ref 7–30)
BUN SERPL-MCNC: 35 MG/DL (ref 7–30)
BUN SERPL-MCNC: 37 MG/DL (ref 7–30)
C PNEUM DNA SPEC QL NAA+PROBE: NOT DETECTED
CALCIUM SERPL-MCNC: 10.2 MG/DL (ref 8.5–10.1)
CALCIUM SERPL-MCNC: 8.7 MG/DL (ref 8.5–10.1)
CALCIUM SERPL-MCNC: 9.1 MG/DL (ref 8.5–10.1)
CALCIUM SERPL-MCNC: 9.2 MG/DL (ref 8.5–10.1)
CALCIUM SERPL-MCNC: 9.2 MG/DL (ref 8.5–10.1)
CALCIUM SERPL-MCNC: 9.3 MG/DL (ref 8.5–10.1)
CALCIUM SERPL-MCNC: 9.3 MG/DL (ref 8.5–10.1)
CALCIUM SERPL-MCNC: 9.5 MG/DL (ref 8.5–10.1)
CALCIUM SERPL-MCNC: 9.6 MG/DL (ref 8.5–10.1)
CALCIUM SERPL-MCNC: 9.7 MG/DL (ref 8.5–10.1)
CHLORIDE SERPL-SCNC: 105 MMOL/L (ref 94–109)
CHLORIDE SERPL-SCNC: 107 MMOL/L (ref 94–109)
CHLORIDE SERPL-SCNC: 108 MMOL/L (ref 94–109)
CHLORIDE SERPL-SCNC: 108 MMOL/L (ref 94–109)
CHLORIDE SERPL-SCNC: 109 MMOL/L (ref 94–109)
CHLORIDE SERPL-SCNC: 109 MMOL/L (ref 94–109)
CHLORIDE SERPL-SCNC: 110 MMOL/L (ref 94–109)
CHLORIDE SERPL-SCNC: 111 MMOL/L (ref 94–109)
CO2 SERPL-SCNC: 27 MMOL/L (ref 20–32)
CO2 SERPL-SCNC: 27 MMOL/L (ref 20–32)
CO2 SERPL-SCNC: 28 MMOL/L (ref 20–32)
CO2 SERPL-SCNC: 28 MMOL/L (ref 20–32)
CO2 SERPL-SCNC: 29 MMOL/L (ref 20–32)
CO2 SERPL-SCNC: 29 MMOL/L (ref 20–32)
CO2 SERPL-SCNC: 30 MMOL/L (ref 20–32)
CO2 SERPL-SCNC: 31 MMOL/L (ref 20–32)
COLOR UR AUTO: ABNORMAL
COLOR UR AUTO: YELLOW
CREAT SERPL-MCNC: 0.89 MG/DL (ref 0.52–1.04)
CREAT SERPL-MCNC: 0.96 MG/DL (ref 0.52–1.04)
CREAT SERPL-MCNC: 1.02 MG/DL (ref 0.52–1.04)
CREAT SERPL-MCNC: 1.04 MG/DL (ref 0.52–1.04)
CREAT SERPL-MCNC: 1.05 MG/DL (ref 0.52–1.04)
CREAT SERPL-MCNC: 1.05 MG/DL (ref 0.52–1.04)
CREAT SERPL-MCNC: 1.07 MG/DL (ref 0.52–1.04)
CREAT SERPL-MCNC: 1.32 MG/DL (ref 0.52–1.04)
CREAT SERPL-MCNC: 1.47 MG/DL (ref 0.52–1.04)
CREAT SERPL-MCNC: 1.6 MG/DL (ref 0.52–1.04)
CRP SERPL-MCNC: 19 MG/L (ref 0–8)
DIFFERENTIAL METHOD BLD: ABNORMAL
EOSINOPHIL # BLD AUTO: 0 10E9/L (ref 0–0.7)
EOSINOPHIL # BLD AUTO: 0.2 10E9/L (ref 0–0.7)
EOSINOPHIL NFR BLD AUTO: 0 %
EOSINOPHIL NFR BLD AUTO: 0 %
EOSINOPHIL NFR BLD AUTO: 0.3 %
EOSINOPHIL NFR BLD AUTO: 2.6 %
ERYTHROCYTE [DISTWIDTH] IN BLOOD BY AUTOMATED COUNT: 13.5 % (ref 10–15)
ERYTHROCYTE [DISTWIDTH] IN BLOOD BY AUTOMATED COUNT: 13.5 % (ref 10–15)
ERYTHROCYTE [DISTWIDTH] IN BLOOD BY AUTOMATED COUNT: 13.6 % (ref 10–15)
ERYTHROCYTE [DISTWIDTH] IN BLOOD BY AUTOMATED COUNT: 13.6 % (ref 10–15)
ERYTHROCYTE [DISTWIDTH] IN BLOOD BY AUTOMATED COUNT: 14.4 % (ref 10–15)
ERYTHROCYTE [DISTWIDTH] IN BLOOD BY AUTOMATED COUNT: 15 % (ref 10–15)
ERYTHROCYTE [DISTWIDTH] IN BLOOD BY AUTOMATED COUNT: 18.6 % (ref 10–15)
ERYTHROCYTE [DISTWIDTH] IN BLOOD BY AUTOMATED COUNT: 18.8 % (ref 10–15)
ERYTHROCYTE [DISTWIDTH] IN BLOOD BY AUTOMATED COUNT: 18.9 % (ref 10–15)
FLUAV H1 2009 PAND RNA SPEC QL NAA+PROBE: NOT DETECTED
FLUAV H1 RNA SPEC QL NAA+PROBE: NOT DETECTED
FLUAV H3 RNA SPEC QL NAA+PROBE: NOT DETECTED
FLUAV RNA SPEC QL NAA+PROBE: NOT DETECTED
FLUAV+FLUBV AG SPEC QL: NEGATIVE
FLUAV+FLUBV AG SPEC QL: NEGATIVE
FLUBV RNA SPEC QL NAA+PROBE: NOT DETECTED
GFR SERPL CREATININE-BSD FRML MDRD: 29 ML/MIN/{1.73_M2}
GFR SERPL CREATININE-BSD FRML MDRD: 32 ML/MIN/{1.73_M2}
GFR SERPL CREATININE-BSD FRML MDRD: 36 ML/MIN/{1.73_M2}
GFR SERPL CREATININE-BSD FRML MDRD: 47 ML/MIN/{1.73_M2}
GFR SERPL CREATININE-BSD FRML MDRD: 48 ML/MIN/{1.73_M2}
GFR SERPL CREATININE-BSD FRML MDRD: 49 ML/MIN/{1.73_M2}
GFR SERPL CREATININE-BSD FRML MDRD: 53 ML/MIN/{1.73_M2}
GFR SERPL CREATININE-BSD FRML MDRD: 58 ML/MIN/{1.73_M2}
GLUCOSE SERPL-MCNC: 102 MG/DL (ref 70–99)
GLUCOSE SERPL-MCNC: 105 MG/DL (ref 70–99)
GLUCOSE SERPL-MCNC: 105 MG/DL (ref 70–99)
GLUCOSE SERPL-MCNC: 115 MG/DL (ref 70–99)
GLUCOSE SERPL-MCNC: 134 MG/DL (ref 70–99)
GLUCOSE SERPL-MCNC: 134 MG/DL (ref 70–99)
GLUCOSE SERPL-MCNC: 202 MG/DL (ref 70–99)
GLUCOSE SERPL-MCNC: 87 MG/DL (ref 70–99)
GLUCOSE SERPL-MCNC: 93 MG/DL (ref 70–99)
GLUCOSE SERPL-MCNC: 95 MG/DL (ref 70–99)
GLUCOSE UR STRIP-MCNC: NEGATIVE MG/DL
GLUCOSE UR STRIP-MCNC: NEGATIVE MG/DL
HADV DNA SPEC QL NAA+PROBE: NOT DETECTED
HCOV PNL SPEC NAA+PROBE: NOT DETECTED
HCT VFR BLD AUTO: 27.9 % (ref 35–47)
HCT VFR BLD AUTO: 28 % (ref 35–47)
HCT VFR BLD AUTO: 30.3 % (ref 35–47)
HCT VFR BLD AUTO: 30.5 % (ref 35–47)
HCT VFR BLD AUTO: 30.9 % (ref 35–47)
HCT VFR BLD AUTO: 32.2 % (ref 35–47)
HCT VFR BLD AUTO: 33.8 % (ref 35–47)
HCT VFR BLD AUTO: 33.9 % (ref 35–47)
HCT VFR BLD AUTO: 36.3 % (ref 35–47)
HGB BLD-MCNC: 10.2 G/DL (ref 11.7–15.7)
HGB BLD-MCNC: 10.4 G/DL (ref 11.7–15.7)
HGB BLD-MCNC: 10.6 G/DL (ref 11.7–15.7)
HGB BLD-MCNC: 8.3 G/DL (ref 11.7–15.7)
HGB BLD-MCNC: 8.3 G/DL (ref 11.7–15.7)
HGB BLD-MCNC: 9.1 G/DL (ref 11.7–15.7)
HGB BLD-MCNC: 9.1 G/DL (ref 11.7–15.7)
HGB BLD-MCNC: 9.4 G/DL (ref 11.7–15.7)
HGB BLD-MCNC: 9.6 G/DL (ref 11.7–15.7)
HGB UR QL STRIP: ABNORMAL
HGB UR QL STRIP: NEGATIVE
HMPV RNA SPEC QL NAA+PROBE: NOT DETECTED
HPIV1 RNA SPEC QL NAA+PROBE: NOT DETECTED
HPIV2 RNA SPEC QL NAA+PROBE: NOT DETECTED
HPIV3 RNA SPEC QL NAA+PROBE: NOT DETECTED
HPIV4 RNA SPEC QL NAA+PROBE: NOT DETECTED
HYALINE CASTS #/AREA URNS LPF: 9 /LPF (ref 0–2)
IMM GRANULOCYTES # BLD: 0 10E9/L (ref 0–0.4)
IMM GRANULOCYTES NFR BLD: 0.3 %
IMM GRANULOCYTES NFR BLD: 0.3 %
IMM GRANULOCYTES NFR BLD: 0.4 %
IMM GRANULOCYTES NFR BLD: 0.6 %
INR PPP: 1.09 (ref 0.86–1.14)
KETONES UR STRIP-MCNC: NEGATIVE MG/DL
KETONES UR STRIP-MCNC: NEGATIVE MG/DL
LEUKOCYTE ESTERASE UR QL STRIP: ABNORMAL
LEUKOCYTE ESTERASE UR QL STRIP: ABNORMAL
LYMPHOCYTES # BLD AUTO: 0.5 10E9/L (ref 0.8–5.3)
LYMPHOCYTES # BLD AUTO: 0.6 10E9/L (ref 0.8–5.3)
LYMPHOCYTES # BLD AUTO: 1 10E9/L (ref 0.8–5.3)
LYMPHOCYTES # BLD AUTO: 1.1 10E9/L (ref 0.8–5.3)
LYMPHOCYTES NFR BLD AUTO: 13.6 %
LYMPHOCYTES NFR BLD AUTO: 16.8 %
LYMPHOCYTES NFR BLD AUTO: 7.5 %
LYMPHOCYTES NFR BLD AUTO: 9.2 %
Lab: ABNORMAL
Lab: ABNORMAL
M PNEUMO DNA SPEC QL NAA+PROBE: NOT DETECTED
MCH RBC QN AUTO: 26.9 PG (ref 26.5–33)
MCH RBC QN AUTO: 27.2 PG (ref 26.5–33)
MCH RBC QN AUTO: 27.4 PG (ref 26.5–33)
MCH RBC QN AUTO: 27.4 PG (ref 26.5–33)
MCH RBC QN AUTO: 27.6 PG (ref 26.5–33)
MCH RBC QN AUTO: 27.7 PG (ref 26.5–33)
MCH RBC QN AUTO: 28.1 PG (ref 26.5–33)
MCHC RBC AUTO-ENTMCNC: 29.2 G/DL (ref 31.5–36.5)
MCHC RBC AUTO-ENTMCNC: 29.6 G/DL (ref 31.5–36.5)
MCHC RBC AUTO-ENTMCNC: 29.7 G/DL (ref 31.5–36.5)
MCHC RBC AUTO-ENTMCNC: 29.8 G/DL (ref 31.5–36.5)
MCHC RBC AUTO-ENTMCNC: 29.8 G/DL (ref 31.5–36.5)
MCHC RBC AUTO-ENTMCNC: 30 G/DL (ref 31.5–36.5)
MCHC RBC AUTO-ENTMCNC: 30.1 G/DL (ref 31.5–36.5)
MCHC RBC AUTO-ENTMCNC: 30.4 G/DL (ref 31.5–36.5)
MCHC RBC AUTO-ENTMCNC: 30.8 G/DL (ref 31.5–36.5)
MCV RBC AUTO: 90 FL (ref 78–100)
MCV RBC AUTO: 91 FL (ref 78–100)
MCV RBC AUTO: 92 FL (ref 78–100)
MCV RBC AUTO: 92 FL (ref 78–100)
MCV RBC AUTO: 93 FL (ref 78–100)
MCV RBC AUTO: 93 FL (ref 78–100)
MCV RBC AUTO: 95 FL (ref 78–100)
MICROBIOLOGIST REVIEW: ABNORMAL
MONOCYTES # BLD AUTO: 0.2 10E9/L (ref 0–1.3)
MONOCYTES # BLD AUTO: 0.3 10E9/L (ref 0–1.3)
MONOCYTES # BLD AUTO: 0.5 10E9/L (ref 0–1.3)
MONOCYTES # BLD AUTO: 0.5 10E9/L (ref 0–1.3)
MONOCYTES NFR BLD AUTO: 3.8 %
MONOCYTES NFR BLD AUTO: 3.9 %
MONOCYTES NFR BLD AUTO: 7.1 %
MONOCYTES NFR BLD AUTO: 7.8 %
MUCOUS THREADS #/AREA URNS LPF: PRESENT /LPF
NEUTROPHILS # BLD AUTO: 5.1 10E9/L (ref 1.6–8.3)
NEUTROPHILS # BLD AUTO: 5.2 10E9/L (ref 1.6–8.3)
NEUTROPHILS # BLD AUTO: 5.3 10E9/L (ref 1.6–8.3)
NEUTROPHILS # BLD AUTO: 5.9 10E9/L (ref 1.6–8.3)
NEUTROPHILS NFR BLD AUTO: 75 %
NEUTROPHILS NFR BLD AUTO: 75.6 %
NEUTROPHILS NFR BLD AUTO: 86.5 %
NEUTROPHILS NFR BLD AUTO: 87.9 %
NITRATE UR QL: NEGATIVE
NITRATE UR QL: NEGATIVE
NRBC # BLD AUTO: 0 10*3/UL
NRBC BLD AUTO-RTO: 0 /100
NT-PROBNP SERPL-MCNC: 793 PG/ML (ref 0–1800)
PH UR STRIP: 5 PH (ref 5–7)
PH UR STRIP: 5 PH (ref 5–7)
PLATELET # BLD AUTO: 147 10E9/L (ref 150–450)
PLATELET # BLD AUTO: 155 10E9/L (ref 150–450)
PLATELET # BLD AUTO: 165 10E9/L (ref 150–450)
PLATELET # BLD AUTO: 195 10E9/L (ref 150–450)
PLATELET # BLD AUTO: 196 10E9/L (ref 150–450)
PLATELET # BLD AUTO: 210 10E9/L (ref 150–450)
PLATELET # BLD AUTO: 211 10E9/L (ref 150–450)
PLATELET # BLD AUTO: 225 10E9/L (ref 150–450)
PLATELET # BLD AUTO: 251 10E9/L (ref 150–450)
POTASSIUM SERPL-SCNC: 3.9 MMOL/L (ref 3.4–5.3)
POTASSIUM SERPL-SCNC: 3.9 MMOL/L (ref 3.4–5.3)
POTASSIUM SERPL-SCNC: 4 MMOL/L (ref 3.4–5.3)
POTASSIUM SERPL-SCNC: 4 MMOL/L (ref 3.4–5.3)
POTASSIUM SERPL-SCNC: 4.1 MMOL/L (ref 3.4–5.3)
POTASSIUM SERPL-SCNC: 4.2 MMOL/L (ref 3.4–5.3)
POTASSIUM SERPL-SCNC: 4.2 MMOL/L (ref 3.4–5.3)
POTASSIUM SERPL-SCNC: 4.3 MMOL/L (ref 3.4–5.3)
POTASSIUM SERPL-SCNC: 4.4 MMOL/L (ref 3.4–5.3)
POTASSIUM SERPL-SCNC: 4.9 MMOL/L (ref 3.4–5.3)
PROCALCITONIN SERPL-MCNC: <0.05 NG/ML
PROT SERPL-MCNC: 6.1 G/DL (ref 6.8–8.8)
PROT SERPL-MCNC: 6.5 G/DL (ref 6.8–8.8)
RBC # BLD AUTO: 3.03 10E12/L (ref 3.8–5.2)
RBC # BLD AUTO: 3.08 10E12/L (ref 3.8–5.2)
RBC # BLD AUTO: 3.32 10E12/L (ref 3.8–5.2)
RBC # BLD AUTO: 3.34 10E12/L (ref 3.8–5.2)
RBC # BLD AUTO: 3.34 10E12/L (ref 3.8–5.2)
RBC # BLD AUTO: 3.48 10E12/L (ref 3.8–5.2)
RBC # BLD AUTO: 3.7 10E12/L (ref 3.8–5.2)
RBC # BLD AUTO: 3.77 10E12/L (ref 3.8–5.2)
RBC # BLD AUTO: 3.83 10E12/L (ref 3.8–5.2)
RBC #/AREA URNS AUTO: 2 /HPF (ref 0–2)
RBC #/AREA URNS AUTO: >182 /HPF (ref 0–2)
RSV RNA SPEC QL NAA+PROBE: ABNORMAL
RSV RNA SPEC QL NAA+PROBE: NOT DETECTED
RV+EV RNA SPEC QL NAA+PROBE: NOT DETECTED
SODIUM SERPL-SCNC: 138 MMOL/L (ref 133–144)
SODIUM SERPL-SCNC: 138 MMOL/L (ref 133–144)
SODIUM SERPL-SCNC: 139 MMOL/L (ref 133–144)
SODIUM SERPL-SCNC: 139 MMOL/L (ref 133–144)
SODIUM SERPL-SCNC: 141 MMOL/L (ref 133–144)
SODIUM SERPL-SCNC: 143 MMOL/L (ref 133–144)
SODIUM SERPL-SCNC: 143 MMOL/L (ref 133–144)
SODIUM SERPL-SCNC: 144 MMOL/L (ref 133–144)
SOURCE: ABNORMAL
SOURCE: ABNORMAL
SP GR UR STRIP: 1.01 (ref 1–1.03)
SP GR UR STRIP: 1.02 (ref 1–1.03)
SPECIMEN SOURCE: ABNORMAL
SPECIMEN SOURCE: ABNORMAL
SPECIMEN SOURCE: NORMAL
SQUAMOUS #/AREA URNS AUTO: 4 /HPF (ref 0–1)
SQUAMOUS #/AREA URNS AUTO: <1 /HPF (ref 0–1)
TROPONIN I SERPL-MCNC: 0.02 UG/L (ref 0–0.04)
TROPONIN I SERPL-MCNC: <0.015 UG/L (ref 0–0.04)
UROBILINOGEN UR STRIP-MCNC: 0 MG/DL (ref 0–2)
UROBILINOGEN UR STRIP-MCNC: 0 MG/DL (ref 0–2)
WBC # BLD AUTO: 5.5 10E9/L (ref 4–11)
WBC # BLD AUTO: 5.8 10E9/L (ref 4–11)
WBC # BLD AUTO: 6.1 10E9/L (ref 4–11)
WBC # BLD AUTO: 6.4 10E9/L (ref 4–11)
WBC # BLD AUTO: 6.5 10E9/L (ref 4–11)
WBC # BLD AUTO: 6.7 10E9/L (ref 4–11)
WBC # BLD AUTO: 6.8 10E9/L (ref 4–11)
WBC # BLD AUTO: 7 10E9/L (ref 4–11)
WBC # BLD AUTO: 7.1 10E9/L (ref 4–11)
WBC #/AREA URNS AUTO: 133 /HPF (ref 0–5)
WBC #/AREA URNS AUTO: 88 /HPF (ref 0–5)
YEAST #/AREA URNS HPF: ABNORMAL /HPF

## 2019-01-01 PROCEDURE — 25000128 H RX IP 250 OP 636: Performed by: FAMILY MEDICINE

## 2019-01-01 PROCEDURE — 25000132 ZZH RX MED GY IP 250 OP 250 PS 637: Performed by: FAMILY MEDICINE

## 2019-01-01 PROCEDURE — 99219 ZZC INITIAL OBSERVATION CARE,LEVL II: CPT | Performed by: FAMILY MEDICINE

## 2019-01-01 PROCEDURE — 84484 ASSAY OF TROPONIN QUANT: CPT | Performed by: FAMILY MEDICINE

## 2019-01-01 PROCEDURE — 97166 OT EVAL MOD COMPLEX 45 MIN: CPT | Mod: GO

## 2019-01-01 PROCEDURE — 25000125 ZZHC RX 250: Performed by: NURSE PRACTITIONER

## 2019-01-01 PROCEDURE — 25000131 ZZH RX MED GY IP 250 OP 636 PS 637: Performed by: PHYSICIAN ASSISTANT

## 2019-01-01 PROCEDURE — 72192 CT PELVIS W/O DYE: CPT

## 2019-01-01 PROCEDURE — 12000000 ZZH R&B MED SURG/OB

## 2019-01-01 PROCEDURE — 80053 COMPREHEN METABOLIC PANEL: CPT | Performed by: FAMILY MEDICINE

## 2019-01-01 PROCEDURE — 40000275 ZZH STATISTIC RCP TIME EA 10 MIN

## 2019-01-01 PROCEDURE — 87186 SC STD MICRODIL/AGAR DIL: CPT | Performed by: FAMILY MEDICINE

## 2019-01-01 PROCEDURE — 40000270 ZZH STATISTIC OXYGEN  O2DAILY TECH TIME

## 2019-01-01 PROCEDURE — 25000132 ZZH RX MED GY IP 250 OP 250 PS 637: Performed by: PHYSICIAN ASSISTANT

## 2019-01-01 PROCEDURE — 40000274 ZZH STATISTIC RCP CONSULT EA 30 MIN

## 2019-01-01 PROCEDURE — 87804 INFLUENZA ASSAY W/OPTIC: CPT | Performed by: NURSE PRACTITIONER

## 2019-01-01 PROCEDURE — 25500064 ZZH RX 255 OP 636: Performed by: ANESTHESIOLOGY

## 2019-01-01 PROCEDURE — 25000125 ZZHC RX 250: Performed by: PHYSICIAN ASSISTANT

## 2019-01-01 PROCEDURE — 99239 HOSP IP/OBS DSCHRG MGMT >30: CPT | Performed by: FAMILY MEDICINE

## 2019-01-01 PROCEDURE — 25000132 ZZH RX MED GY IP 250 OP 250 PS 637: Performed by: INTERNAL MEDICINE

## 2019-01-01 PROCEDURE — 99305 1ST NF CARE MODERATE MDM 35: CPT | Performed by: INTERNAL MEDICINE

## 2019-01-01 PROCEDURE — 85025 COMPLETE CBC W/AUTO DIFF WBC: CPT | Performed by: FAMILY MEDICINE

## 2019-01-01 PROCEDURE — 99309 SBSQ NF CARE MODERATE MDM 30: CPT | Performed by: NURSE PRACTITIONER

## 2019-01-01 PROCEDURE — 71045 X-RAY EXAM CHEST 1 VIEW: CPT

## 2019-01-01 PROCEDURE — 85027 COMPLETE CBC AUTOMATED: CPT | Performed by: INTERNAL MEDICINE

## 2019-01-01 PROCEDURE — 80048 BASIC METABOLIC PNL TOTAL CA: CPT | Performed by: INTERNAL MEDICINE

## 2019-01-01 PROCEDURE — G0378 HOSPITAL OBSERVATION PER HR: HCPCS

## 2019-01-01 PROCEDURE — 99225 ZZC SUBSEQUENT OBSERVATION CARE,LEVEL II: CPT | Performed by: INTERNAL MEDICINE

## 2019-01-01 PROCEDURE — 99285 EMERGENCY DEPT VISIT HI MDM: CPT | Mod: 25 | Performed by: FAMILY MEDICINE

## 2019-01-01 PROCEDURE — 86140 C-REACTIVE PROTEIN: CPT | Performed by: FAMILY MEDICINE

## 2019-01-01 PROCEDURE — 99207 ZZC CDG-CODE CATEGORY CHANGED: CPT | Performed by: NURSE PRACTITIONER

## 2019-01-01 PROCEDURE — 85610 PROTHROMBIN TIME: CPT | Performed by: FAMILY MEDICINE

## 2019-01-01 PROCEDURE — 36415 COLL VENOUS BLD VENIPUNCTURE: CPT | Performed by: PHYSICIAN ASSISTANT

## 2019-01-01 PROCEDURE — 97110 THERAPEUTIC EXERCISES: CPT | Mod: GP | Performed by: PHYSICAL THERAPIST

## 2019-01-01 PROCEDURE — 93005 ELECTROCARDIOGRAM TRACING: CPT | Performed by: FAMILY MEDICINE

## 2019-01-01 PROCEDURE — 77002 NEEDLE LOCALIZATION BY XRAY: CPT | Mod: 26 | Performed by: ANESTHESIOLOGY

## 2019-01-01 PROCEDURE — 36415 COLL VENOUS BLD VENIPUNCTURE: CPT | Performed by: INTERNAL MEDICINE

## 2019-01-01 PROCEDURE — 97161 PT EVAL LOW COMPLEX 20 MIN: CPT | Mod: GP

## 2019-01-01 PROCEDURE — 97535 SELF CARE MNGMENT TRAINING: CPT | Mod: GO

## 2019-01-01 PROCEDURE — 87486 CHLMYD PNEUM DNA AMP PROBE: CPT | Performed by: INTERNAL MEDICINE

## 2019-01-01 PROCEDURE — 25800030 ZZH RX IP 258 OP 636: Performed by: INTERNAL MEDICINE

## 2019-01-01 PROCEDURE — 85025 COMPLETE CBC W/AUTO DIFF WBC: CPT | Performed by: INTERNAL MEDICINE

## 2019-01-01 PROCEDURE — 94640 AIRWAY INHALATION TREATMENT: CPT

## 2019-01-01 PROCEDURE — 85027 COMPLETE CBC AUTOMATED: CPT | Performed by: FAMILY MEDICINE

## 2019-01-01 PROCEDURE — 25800030 ZZH RX IP 258 OP 636: Performed by: PHYSICIAN ASSISTANT

## 2019-01-01 PROCEDURE — 96374 THER/PROPH/DIAG INJ IV PUSH: CPT | Mod: 59 | Performed by: FAMILY MEDICINE

## 2019-01-01 PROCEDURE — 93010 ELECTROCARDIOGRAM REPORT: CPT | Mod: Z6 | Performed by: FAMILY MEDICINE

## 2019-01-01 PROCEDURE — 93010 ELECTROCARDIOGRAM REPORT: CPT | Mod: Z6 | Performed by: EMERGENCY MEDICINE

## 2019-01-01 PROCEDURE — 93005 ELECTROCARDIOGRAM TRACING: CPT

## 2019-01-01 PROCEDURE — 99285 EMERGENCY DEPT VISIT HI MDM: CPT | Mod: 25

## 2019-01-01 PROCEDURE — 99223 1ST HOSP IP/OBS HIGH 75: CPT | Mod: AI | Performed by: PHYSICIAN ASSISTANT

## 2019-01-01 PROCEDURE — 25000125 ZZHC RX 250: Performed by: FAMILY MEDICINE

## 2019-01-01 PROCEDURE — 96367 TX/PROPH/DG ADDL SEQ IV INF: CPT

## 2019-01-01 PROCEDURE — 73030 X-RAY EXAM OF SHOULDER: CPT | Mod: LT

## 2019-01-01 PROCEDURE — 71046 X-RAY EXAM CHEST 2 VIEWS: CPT

## 2019-01-01 PROCEDURE — 87581 M.PNEUMON DNA AMP PROBE: CPT | Performed by: INTERNAL MEDICINE

## 2019-01-01 PROCEDURE — 71275 CT ANGIOGRAPHY CHEST: CPT

## 2019-01-01 PROCEDURE — 96375 TX/PRO/DX INJ NEW DRUG ADDON: CPT | Mod: 59 | Performed by: FAMILY MEDICINE

## 2019-01-01 PROCEDURE — 97110 THERAPEUTIC EXERCISES: CPT | Mod: GP

## 2019-01-01 PROCEDURE — 94640 AIRWAY INHALATION TREATMENT: CPT | Mod: 76

## 2019-01-01 PROCEDURE — 99232 SBSQ HOSP IP/OBS MODERATE 35: CPT | Performed by: INTERNAL MEDICINE

## 2019-01-01 PROCEDURE — 99214 OFFICE O/P EST MOD 30 MIN: CPT | Performed by: NURSE PRACTITIONER

## 2019-01-01 PROCEDURE — 73610 X-RAY EXAM OF ANKLE: CPT | Mod: LT

## 2019-01-01 PROCEDURE — 84145 PROCALCITONIN (PCT): CPT | Performed by: NURSE PRACTITIONER

## 2019-01-01 PROCEDURE — 36415 COLL VENOUS BLD VENIPUNCTURE: CPT | Performed by: FAMILY MEDICINE

## 2019-01-01 PROCEDURE — 97161 PT EVAL LOW COMPLEX 20 MIN: CPT | Mod: GP | Performed by: PHYSICAL THERAPIST

## 2019-01-01 PROCEDURE — 97530 THERAPEUTIC ACTIVITIES: CPT | Mod: GP

## 2019-01-01 PROCEDURE — 99310 SBSQ NF CARE HIGH MDM 45: CPT | Performed by: NURSE PRACTITIONER

## 2019-01-01 PROCEDURE — 25000128 H RX IP 250 OP 636: Performed by: NURSE PRACTITIONER

## 2019-01-01 PROCEDURE — 73552 X-RAY EXAM OF FEMUR 2/>: CPT | Mod: LT

## 2019-01-01 PROCEDURE — 81001 URINALYSIS AUTO W/SCOPE: CPT | Performed by: PHYSICIAN ASSISTANT

## 2019-01-01 PROCEDURE — 25000128 H RX IP 250 OP 636: Performed by: INTERNAL MEDICINE

## 2019-01-01 PROCEDURE — 25000128 H RX IP 250 OP 636: Performed by: ANESTHESIOLOGY

## 2019-01-01 PROCEDURE — 80048 BASIC METABOLIC PNL TOTAL CA: CPT | Performed by: PHYSICIAN ASSISTANT

## 2019-01-01 PROCEDURE — 72125 CT NECK SPINE W/O DYE: CPT

## 2019-01-01 PROCEDURE — 80048 BASIC METABOLIC PNL TOTAL CA: CPT | Performed by: FAMILY MEDICINE

## 2019-01-01 PROCEDURE — 20610 DRAIN/INJ JOINT/BURSA W/O US: CPT | Mod: RT | Performed by: ANESTHESIOLOGY

## 2019-01-01 PROCEDURE — 99316 NF DSCHRG MGMT 30 MIN+: CPT | Performed by: NURSE PRACTITIONER

## 2019-01-01 PROCEDURE — 25800030 ZZH RX IP 258 OP 636: Performed by: NURSE PRACTITIONER

## 2019-01-01 PROCEDURE — 97110 THERAPEUTIC EXERCISES: CPT | Mod: GO

## 2019-01-01 PROCEDURE — 96365 THER/PROPH/DIAG IV INF INIT: CPT

## 2019-01-01 PROCEDURE — 87633 RESP VIRUS 12-25 TARGETS: CPT | Performed by: INTERNAL MEDICINE

## 2019-01-01 PROCEDURE — 83880 ASSAY OF NATRIURETIC PEPTIDE: CPT | Performed by: NURSE PRACTITIONER

## 2019-01-01 PROCEDURE — 87086 URINE CULTURE/COLONY COUNT: CPT | Performed by: FAMILY MEDICINE

## 2019-01-01 PROCEDURE — 25000131 ZZH RX MED GY IP 250 OP 636 PS 637: Performed by: NURSE PRACTITIONER

## 2019-01-01 PROCEDURE — 87088 URINE BACTERIA CULTURE: CPT | Performed by: FAMILY MEDICINE

## 2019-01-01 PROCEDURE — 99233 SBSQ HOSP IP/OBS HIGH 50: CPT | Performed by: FAMILY MEDICINE

## 2019-01-01 PROCEDURE — 27210202 XR JOINT INJECTION MAJOR RIGHT: Mod: TC

## 2019-01-01 PROCEDURE — 99285 EMERGENCY DEPT VISIT HI MDM: CPT | Mod: 25 | Performed by: EMERGENCY MEDICINE

## 2019-01-01 PROCEDURE — 70450 CT HEAD/BRAIN W/O DYE: CPT

## 2019-01-01 PROCEDURE — 99239 HOSP IP/OBS DSCHRG MGMT >30: CPT | Performed by: INTERNAL MEDICINE

## 2019-01-01 RX ORDER — PREDNISONE 5 MG/1
5 TABLET ORAL DAILY
Qty: 30 TABLET | Refills: 1 | Status: ON HOLD | OUTPATIENT
Start: 2019-01-01 | End: 2019-01-01

## 2019-01-01 RX ORDER — CALCIUM CARBONATE 500 MG/1
500 TABLET, CHEWABLE ORAL 3 TIMES DAILY PRN
Status: DISCONTINUED | OUTPATIENT
Start: 2019-01-01 | End: 2019-01-01 | Stop reason: HOSPADM

## 2019-01-01 RX ORDER — OXYCODONE HCL 10 MG/1
10 TABLET, FILM COATED, EXTENDED RELEASE ORAL EVERY 12 HOURS
Qty: 60 TABLET | Refills: 0 | Status: SHIPPED | OUTPATIENT
Start: 2019-01-01 | End: 2020-01-01

## 2019-01-01 RX ORDER — OXYCODONE HYDROCHLORIDE 5 MG/1
5 TABLET ORAL EVERY 4 HOURS PRN
Status: DISCONTINUED | OUTPATIENT
Start: 2019-01-01 | End: 2019-01-01 | Stop reason: HOSPADM

## 2019-01-01 RX ORDER — IPRATROPIUM BROMIDE AND ALBUTEROL SULFATE 2.5; .5 MG/3ML; MG/3ML
3 SOLUTION RESPIRATORY (INHALATION) ONCE
Status: COMPLETED | OUTPATIENT
Start: 2019-01-01 | End: 2019-01-01

## 2019-01-01 RX ORDER — DULOXETIN HYDROCHLORIDE 30 MG/1
60 CAPSULE, DELAYED RELEASE ORAL EVERY MORNING
Status: DISCONTINUED | OUTPATIENT
Start: 2019-01-01 | End: 2019-01-01 | Stop reason: HOSPADM

## 2019-01-01 RX ORDER — DONEPEZIL HYDROCHLORIDE 5 MG/1
5 TABLET, FILM COATED ORAL AT BEDTIME
Status: DISCONTINUED | OUTPATIENT
Start: 2019-01-01 | End: 2019-01-01 | Stop reason: HOSPADM

## 2019-01-01 RX ORDER — CALCIUM CARBONATE 500 MG/1
1 TABLET, CHEWABLE ORAL 3 TIMES DAILY
DISCHARGE
Start: 2019-01-01 | End: 2020-01-01

## 2019-01-01 RX ORDER — GUAIFENESIN/DEXTROMETHORPHAN 100-10MG/5
5 SYRUP ORAL EVERY 4 HOURS PRN
Status: DISCONTINUED | OUTPATIENT
Start: 2019-01-01 | End: 2019-01-01 | Stop reason: HOSPADM

## 2019-01-01 RX ORDER — LEVALBUTEROL INHALATION SOLUTION 1.25 MG/3ML
1 SOLUTION RESPIRATORY (INHALATION) EVERY 4 HOURS PRN
Status: DISCONTINUED | OUTPATIENT
Start: 2019-01-01 | End: 2019-01-01 | Stop reason: HOSPADM

## 2019-01-01 RX ORDER — BENZONATATE 100 MG/1
100 CAPSULE ORAL 3 TIMES DAILY PRN
Status: DISCONTINUED | OUTPATIENT
Start: 2019-01-01 | End: 2019-01-01 | Stop reason: HOSPADM

## 2019-01-01 RX ORDER — LISINOPRIL 10 MG/1
TABLET ORAL
Qty: 30 TABLET | Refills: 98 | Status: SHIPPED | OUTPATIENT
Start: 2019-01-01 | End: 2020-01-01 | Stop reason: DRUGHIGH

## 2019-01-01 RX ORDER — GABAPENTIN 300 MG/1
300 CAPSULE ORAL 2 TIMES DAILY
Status: DISCONTINUED | OUTPATIENT
Start: 2019-01-01 | End: 2019-01-01 | Stop reason: HOSPADM

## 2019-01-01 RX ORDER — PREDNISONE 20 MG/1
40 TABLET ORAL ONCE
Status: COMPLETED | OUTPATIENT
Start: 2019-01-01 | End: 2019-01-01

## 2019-01-01 RX ORDER — IPRATROPIUM BROMIDE AND ALBUTEROL SULFATE 2.5; .5 MG/3ML; MG/3ML
3 SOLUTION RESPIRATORY (INHALATION)
Status: DISCONTINUED | OUTPATIENT
Start: 2019-01-01 | End: 2019-01-01 | Stop reason: HOSPADM

## 2019-01-01 RX ORDER — LISINOPRIL 10 MG/1
10 TABLET ORAL DAILY
Qty: 30 TABLET | Refills: 1
Start: 2019-01-01 | End: 2019-01-01

## 2019-01-01 RX ORDER — NIFEDIPINE 30 MG/1
60 TABLET, EXTENDED RELEASE ORAL DAILY
Status: DISCONTINUED | OUTPATIENT
Start: 2019-01-01 | End: 2019-01-01 | Stop reason: HOSPADM

## 2019-01-01 RX ORDER — LIDOCAINE 4 G/G
1 PATCH TOPICAL DAILY
Qty: 30 PATCH | Refills: 11 | Status: SHIPPED | OUTPATIENT
Start: 2019-01-01 | End: 2020-01-01

## 2019-01-01 RX ORDER — AMOXICILLIN 250 MG
1 CAPSULE ORAL 2 TIMES DAILY
DISCHARGE
Start: 2019-01-01 | End: 2019-01-01

## 2019-01-01 RX ORDER — FUROSEMIDE 40 MG
40 TABLET ORAL 2 TIMES DAILY
Status: DISCONTINUED | OUTPATIENT
Start: 2019-01-01 | End: 2019-01-01

## 2019-01-01 RX ORDER — HYDROMORPHONE HYDROCHLORIDE 2 MG/1
2-4 TABLET ORAL EVERY 6 HOURS PRN
Qty: 10 TABLET | Refills: 0 | Status: SHIPPED | OUTPATIENT
Start: 2019-01-01 | End: 2019-01-01

## 2019-01-01 RX ORDER — HYDROMORPHONE HYDROCHLORIDE 1 MG/ML
0.5 INJECTION, SOLUTION INTRAMUSCULAR; INTRAVENOUS; SUBCUTANEOUS ONCE
Status: COMPLETED | OUTPATIENT
Start: 2019-01-01 | End: 2019-01-01

## 2019-01-01 RX ORDER — ONDANSETRON 4 MG/1
4 TABLET, ORALLY DISINTEGRATING ORAL EVERY 6 HOURS PRN
Status: DISCONTINUED | OUTPATIENT
Start: 2019-01-01 | End: 2019-01-01 | Stop reason: HOSPADM

## 2019-01-01 RX ORDER — TRIAMCINOLONE ACETONIDE 40 MG/ML
40 INJECTION, SUSPENSION INTRA-ARTICULAR; INTRAMUSCULAR ONCE
Status: COMPLETED | OUTPATIENT
Start: 2019-01-01 | End: 2019-01-01

## 2019-01-01 RX ORDER — ASPIRIN 81 MG/1
81 TABLET, CHEWABLE ORAL ONCE
Status: DISCONTINUED | OUTPATIENT
Start: 2019-01-01 | End: 2019-01-01 | Stop reason: CLARIF

## 2019-01-01 RX ORDER — LISINOPRIL 20 MG/1
20 TABLET ORAL 2 TIMES DAILY
Status: DISCONTINUED | OUTPATIENT
Start: 2019-01-01 | End: 2019-01-01

## 2019-01-01 RX ORDER — LIDOCAINE HYDROCHLORIDE 10 MG/ML
5 INJECTION, SOLUTION EPIDURAL; INFILTRATION; INTRACAUDAL; PERINEURAL ONCE
Status: COMPLETED | OUTPATIENT
Start: 2019-01-01 | End: 2019-01-01

## 2019-01-01 RX ORDER — SULFAMETHOXAZOLE/TRIMETHOPRIM 800-160 MG
1 TABLET ORAL 2 TIMES DAILY
Qty: 6 TABLET | Refills: 0 | Status: SHIPPED | OUTPATIENT
Start: 2019-01-01 | End: 2019-01-01

## 2019-01-01 RX ORDER — DULOXETIN HYDROCHLORIDE 30 MG/1
30 CAPSULE, DELAYED RELEASE ORAL AT BEDTIME
Status: DISCONTINUED | OUTPATIENT
Start: 2019-01-01 | End: 2019-01-01 | Stop reason: HOSPADM

## 2019-01-01 RX ORDER — ACETAMINOPHEN 500 MG
1000 TABLET ORAL 3 TIMES DAILY
Status: DISCONTINUED | OUTPATIENT
Start: 2019-01-01 | End: 2019-01-01 | Stop reason: HOSPADM

## 2019-01-01 RX ORDER — ONDANSETRON 2 MG/ML
4 INJECTION INTRAMUSCULAR; INTRAVENOUS ONCE
Status: COMPLETED | OUTPATIENT
Start: 2019-01-01 | End: 2019-01-01

## 2019-01-01 RX ORDER — NIFEDIPINE 30 MG/1
30 TABLET, EXTENDED RELEASE ORAL DAILY
Status: DISCONTINUED | OUTPATIENT
Start: 2019-01-01 | End: 2019-01-01

## 2019-01-01 RX ORDER — FERROUS SULFATE 325(65) MG
325 TABLET ORAL DAILY
DISCHARGE
Start: 2019-01-01 | End: 2019-01-01

## 2019-01-01 RX ORDER — HYDROMORPHONE HYDROCHLORIDE 2 MG/1
2-4 TABLET ORAL EVERY 6 HOURS PRN
Qty: 20 TABLET | Refills: 0 | Status: SHIPPED | OUTPATIENT
Start: 2019-01-01 | End: 2019-01-01

## 2019-01-01 RX ORDER — ATORVASTATIN CALCIUM 20 MG/1
40 TABLET, FILM COATED ORAL AT BEDTIME
Status: DISCONTINUED | OUTPATIENT
Start: 2019-01-01 | End: 2019-01-01 | Stop reason: HOSPADM

## 2019-01-01 RX ORDER — HYDROMORPHONE HYDROCHLORIDE 2 MG/1
2 TABLET ORAL 2 TIMES DAILY PRN
Qty: 30 TABLET | Refills: 0 | Status: ON HOLD | OUTPATIENT
Start: 2019-01-01 | End: 2019-01-01

## 2019-01-01 RX ORDER — CALCIUM CARBONATE 500 MG/1
1 TABLET, CHEWABLE ORAL 3 TIMES DAILY PRN
COMMUNITY
End: 2019-01-01

## 2019-01-01 RX ORDER — ALBUTEROL SULFATE 5 MG/ML
2.5 SOLUTION RESPIRATORY (INHALATION)
Status: DISCONTINUED | OUTPATIENT
Start: 2019-01-01 | End: 2019-01-01 | Stop reason: HOSPADM

## 2019-01-01 RX ORDER — ASPIRIN 81 MG/1
81 TABLET, CHEWABLE ORAL DAILY
Status: DISCONTINUED | OUTPATIENT
Start: 2019-01-01 | End: 2019-01-01 | Stop reason: HOSPADM

## 2019-01-01 RX ORDER — SODIUM CHLORIDE 9 MG/ML
INJECTION, SOLUTION INTRAVENOUS CONTINUOUS
Status: DISCONTINUED | OUTPATIENT
Start: 2019-01-01 | End: 2019-01-01

## 2019-01-01 RX ORDER — GABAPENTIN 300 MG/1
CAPSULE ORAL
Qty: 60 CAPSULE | Refills: 98 | Status: SHIPPED | OUTPATIENT
Start: 2019-01-01 | End: 2020-01-01 | Stop reason: DRUGHIGH

## 2019-01-01 RX ORDER — TIZANIDINE 2 MG/1
2 TABLET ORAL EVERY 8 HOURS PRN
DISCHARGE
Start: 2019-01-01 | End: 2019-01-01

## 2019-01-01 RX ORDER — NIFEDIPINE 30 MG
30 TABLET, EXTENDED RELEASE ORAL DAILY
DISCHARGE
Start: 2019-01-01 | End: 2019-01-01 | Stop reason: DRUGHIGH

## 2019-01-01 RX ORDER — OXYCODONE HCL 10 MG/1
10 TABLET, FILM COATED, EXTENDED RELEASE ORAL EVERY 12 HOURS
Status: DISCONTINUED | OUTPATIENT
Start: 2019-01-01 | End: 2019-01-01 | Stop reason: HOSPADM

## 2019-01-01 RX ORDER — AMOXICILLIN 250 MG
1 CAPSULE ORAL 2 TIMES DAILY
Status: DISCONTINUED | OUTPATIENT
Start: 2019-01-01 | End: 2019-01-01 | Stop reason: HOSPADM

## 2019-01-01 RX ORDER — BENZONATATE 100 MG/1
100 CAPSULE ORAL 3 TIMES DAILY PRN
Qty: 25 CAPSULE | Refills: 0 | Status: SHIPPED | OUTPATIENT
Start: 2019-01-01 | End: 2020-01-01

## 2019-01-01 RX ORDER — GABAPENTIN 300 MG/1
300 CAPSULE ORAL 2 TIMES DAILY
COMMUNITY
End: 2019-01-01

## 2019-01-01 RX ORDER — BISACODYL 10 MG
10 SUPPOSITORY, RECTAL RECTAL DAILY PRN
Status: DISCONTINUED | OUTPATIENT
Start: 2019-01-01 | End: 2019-01-01 | Stop reason: HOSPADM

## 2019-01-01 RX ORDER — LIDOCAINE 40 MG/G
CREAM TOPICAL
Status: DISCONTINUED | OUTPATIENT
Start: 2019-01-01 | End: 2019-01-01 | Stop reason: HOSPADM

## 2019-01-01 RX ORDER — LISINOPRIL 10 MG/1
10 TABLET ORAL EVERY MORNING
Status: DISCONTINUED | OUTPATIENT
Start: 2019-01-01 | End: 2019-01-01 | Stop reason: HOSPADM

## 2019-01-01 RX ORDER — NIFEDIPINE 30 MG/1
30 TABLET, EXTENDED RELEASE ORAL DAILY
Status: DISCONTINUED | OUTPATIENT
Start: 2019-01-01 | End: 2019-01-01 | Stop reason: HOSPADM

## 2019-01-01 RX ORDER — ONDANSETRON 2 MG/ML
4 INJECTION INTRAMUSCULAR; INTRAVENOUS EVERY 6 HOURS PRN
Status: DISCONTINUED | OUTPATIENT
Start: 2019-01-01 | End: 2019-01-01 | Stop reason: HOSPADM

## 2019-01-01 RX ORDER — NIFEDIPINE 60 MG/1
60 TABLET, EXTENDED RELEASE ORAL DAILY
Qty: 30 TABLET | Refills: 98 | COMMUNITY
Start: 2019-01-01 | End: 2019-01-01

## 2019-01-01 RX ORDER — FUROSEMIDE 20 MG
20 TABLET ORAL DAILY
Qty: 30 TABLET | Refills: 1
Start: 2019-01-01 | End: 2019-01-01

## 2019-01-01 RX ORDER — GABAPENTIN 100 MG/1
200 CAPSULE ORAL 2 TIMES DAILY
Status: DISCONTINUED | OUTPATIENT
Start: 2019-01-01 | End: 2019-01-01 | Stop reason: HOSPADM

## 2019-01-01 RX ORDER — IPRATROPIUM BROMIDE AND ALBUTEROL SULFATE 2.5; .5 MG/3ML; MG/3ML
3 SOLUTION RESPIRATORY (INHALATION) 4 TIMES DAILY
Qty: 168 ML | Refills: 0 | Status: SHIPPED | OUTPATIENT
Start: 2019-01-01 | End: 2020-01-01

## 2019-01-01 RX ORDER — NALOXONE HYDROCHLORIDE 0.4 MG/ML
.1-.4 INJECTION, SOLUTION INTRAMUSCULAR; INTRAVENOUS; SUBCUTANEOUS
Status: DISCONTINUED | OUTPATIENT
Start: 2019-01-01 | End: 2019-01-01 | Stop reason: HOSPADM

## 2019-01-01 RX ORDER — CEFTRIAXONE SODIUM 2 G/50ML
2 INJECTION, SOLUTION INTRAVENOUS EVERY 24 HOURS
Status: DISCONTINUED | OUTPATIENT
Start: 2019-01-01 | End: 2019-01-01

## 2019-01-01 RX ORDER — FERROUS SULFATE 325(65) MG
325 TABLET ORAL EVERY EVENING
Status: DISCONTINUED | OUTPATIENT
Start: 2019-01-01 | End: 2019-01-01 | Stop reason: HOSPADM

## 2019-01-01 RX ORDER — FERROUS SULFATE 325(65) MG
325 TABLET ORAL DAILY
Status: DISCONTINUED | OUTPATIENT
Start: 2019-01-01 | End: 2019-01-01 | Stop reason: HOSPADM

## 2019-01-01 RX ORDER — BUPIVACAINE HYDROCHLORIDE 5 MG/ML
10 INJECTION, SOLUTION PERINEURAL ONCE
Status: COMPLETED | OUTPATIENT
Start: 2019-01-01 | End: 2019-01-01

## 2019-01-01 RX ORDER — OXYCODONE HYDROCHLORIDE 5 MG/1
5 TABLET ORAL DAILY PRN
Status: DISCONTINUED | OUTPATIENT
Start: 2019-01-01 | End: 2019-01-01 | Stop reason: HOSPADM

## 2019-01-01 RX ORDER — VITAMIN B COMPLEX
25 TABLET ORAL DAILY
Status: DISCONTINUED | OUTPATIENT
Start: 2019-01-01 | End: 2019-01-01 | Stop reason: HOSPADM

## 2019-01-01 RX ORDER — IOPAMIDOL 755 MG/ML
81 INJECTION, SOLUTION INTRAVASCULAR ONCE
Status: COMPLETED | OUTPATIENT
Start: 2019-01-01 | End: 2019-01-01

## 2019-01-01 RX ORDER — TIZANIDINE 2 MG/1
2 TABLET ORAL EVERY 8 HOURS PRN
COMMUNITY
Start: 2019-01-01 | End: 2020-01-01

## 2019-01-01 RX ORDER — NYSTATIN 100000 [USP'U]/G
POWDER TOPICAL 3 TIMES DAILY PRN
Status: DISCONTINUED | OUTPATIENT
Start: 2019-01-01 | End: 2019-01-01 | Stop reason: CLARIF

## 2019-01-01 RX ORDER — OXYCODONE HCL 10 MG/1
10 TABLET, FILM COATED, EXTENDED RELEASE ORAL EVERY 12 HOURS
Qty: 30 TABLET | Refills: 0 | Status: SHIPPED | OUTPATIENT
Start: 2019-01-01 | End: 2019-01-01

## 2019-01-01 RX ORDER — TIZANIDINE 2 MG/1
2 TABLET ORAL EVERY 8 HOURS PRN
Status: DISCONTINUED | OUTPATIENT
Start: 2019-01-01 | End: 2019-01-01 | Stop reason: HOSPADM

## 2019-01-01 RX ORDER — FUROSEMIDE 20 MG
TABLET ORAL
Qty: 30 TABLET | Refills: 98 | Status: SHIPPED | OUTPATIENT
Start: 2019-01-01 | End: 2020-01-01

## 2019-01-01 RX ORDER — FERROUS SULFATE 325(65) MG
TABLET ORAL
Qty: 30 TABLET | Refills: 98 | Status: SHIPPED | OUTPATIENT
Start: 2019-01-01 | End: 2020-01-01 | Stop reason: SINTOL

## 2019-01-01 RX ORDER — LIDOCAINE 4 G/G
1 PATCH TOPICAL DAILY PRN
Status: DISCONTINUED | OUTPATIENT
Start: 2019-01-01 | End: 2019-01-01 | Stop reason: HOSPADM

## 2019-01-01 RX ORDER — NAPROXEN 250 MG/1
250 TABLET ORAL 2 TIMES DAILY WITH MEALS
Status: DISCONTINUED | OUTPATIENT
Start: 2019-01-01 | End: 2019-01-01

## 2019-01-01 RX ORDER — ONDANSETRON 4 MG/1
4 TABLET, FILM COATED ORAL EVERY 6 HOURS PRN
Status: DISCONTINUED | OUTPATIENT
Start: 2019-01-01 | End: 2019-01-01 | Stop reason: CLARIF

## 2019-01-01 RX ORDER — PREDNISONE 20 MG/1
40 TABLET ORAL DAILY
Status: DISCONTINUED | OUTPATIENT
Start: 2019-01-01 | End: 2019-01-01 | Stop reason: HOSPADM

## 2019-01-01 RX ORDER — TIZANIDINE 2 MG/1
2 TABLET ORAL EVERY 8 HOURS
COMMUNITY
Start: 2019-01-01 | End: 2019-01-01 | Stop reason: ALTCHOICE

## 2019-01-01 RX ORDER — PREDNISONE 10 MG/1
TABLET ORAL
Qty: 18 TABLET | Refills: 0 | Status: SHIPPED | OUTPATIENT
Start: 2019-01-01 | End: 2020-01-01

## 2019-01-01 RX ORDER — OXYCODONE HCL 10 MG/1
10 TABLET, FILM COATED, EXTENDED RELEASE ORAL EVERY 12 HOURS
Status: DISCONTINUED | OUTPATIENT
Start: 2019-01-01 | End: 2019-01-01

## 2019-01-01 RX ORDER — ATORVASTATIN CALCIUM 20 MG/1
40 TABLET, FILM COATED ORAL DAILY
Status: DISCONTINUED | OUTPATIENT
Start: 2019-01-01 | End: 2019-01-01 | Stop reason: HOSPADM

## 2019-01-01 RX ORDER — OXYCODONE HYDROCHLORIDE 5 MG/1
5 TABLET ORAL EVERY 4 HOURS PRN
Qty: 30 TABLET | Refills: 0 | Status: SHIPPED | OUTPATIENT
Start: 2019-01-01 | End: 2019-01-01

## 2019-01-01 RX ORDER — PREDNISONE 5 MG/1
5 TABLET ORAL DAILY
Qty: 30 TABLET | Refills: 1 | COMMUNITY
Start: 2019-01-01 | End: 2019-01-01

## 2019-01-01 RX ORDER — OXYCODONE HCL 10 MG/1
10 TABLET, FILM COATED, EXTENDED RELEASE ORAL EVERY 12 HOURS
Qty: 30 TABLET | Refills: 0 | Status: SHIPPED | DISCHARGE
Start: 2019-01-01 | End: 2019-01-01

## 2019-01-01 RX ORDER — ASPIRIN 81 MG/1
81 TABLET, CHEWABLE ORAL DAILY
DISCHARGE
Start: 2019-01-01 | End: 2020-01-01

## 2019-01-01 RX ORDER — FLUTICASONE PROPIONATE 50 MCG
1 SPRAY, SUSPENSION (ML) NASAL DAILY
Status: DISCONTINUED | OUTPATIENT
Start: 2019-01-01 | End: 2019-01-01 | Stop reason: HOSPADM

## 2019-01-01 RX ORDER — DULOXETIN HYDROCHLORIDE 60 MG/1
60 CAPSULE, DELAYED RELEASE ORAL EVERY MORNING
DISCHARGE
Start: 2019-01-01 | End: 2020-01-01

## 2019-01-01 RX ORDER — AMOXICILLIN 250 MG
1 CAPSULE ORAL 2 TIMES DAILY PRN
Status: DISCONTINUED | OUTPATIENT
Start: 2019-01-01 | End: 2019-01-01 | Stop reason: HOSPADM

## 2019-01-01 RX ORDER — PREDNISONE 20 MG/1
40 TABLET ORAL DAILY
Qty: 6 TABLET | Refills: 0 | Status: SHIPPED | OUTPATIENT
Start: 2019-01-01 | End: 2019-01-01

## 2019-01-01 RX ORDER — FLUTICASONE PROPIONATE 50 MCG
1 SPRAY, SUSPENSION (ML) NASAL DAILY
DISCHARGE
Start: 2019-01-01 | End: 2020-01-01

## 2019-01-01 RX ORDER — FUROSEMIDE 20 MG
20 TABLET ORAL DAILY
Status: DISCONTINUED | OUTPATIENT
Start: 2019-01-01 | End: 2019-01-01 | Stop reason: HOSPADM

## 2019-01-01 RX ORDER — PREDNISONE 10 MG/1
TABLET ORAL
Refills: 98 | Status: SHIPPED
Start: 2019-01-01 | End: 2019-01-01

## 2019-01-01 RX ORDER — DULOXETIN HYDROCHLORIDE 30 MG/1
30 CAPSULE, DELAYED RELEASE ORAL AT BEDTIME
DISCHARGE
Start: 2019-01-01 | End: 2020-01-01

## 2019-01-01 RX ORDER — DULOXETIN HYDROCHLORIDE 30 MG/1
30 CAPSULE, DELAYED RELEASE ORAL 2 TIMES DAILY
Status: DISCONTINUED | OUTPATIENT
Start: 2019-01-01 | End: 2019-01-01 | Stop reason: CLARIF

## 2019-01-01 RX ORDER — NIFEDIPINE 60 MG/1
TABLET, EXTENDED RELEASE ORAL
Qty: 30 TABLET | Refills: 98 | Status: SHIPPED | OUTPATIENT
Start: 2019-01-01 | End: 2019-01-01

## 2019-01-01 RX ADMIN — OMEPRAZOLE 40 MG: 20 CAPSULE, DELAYED RELEASE ORAL at 08:19

## 2019-01-01 RX ADMIN — NIFEDIPINE 30 MG: 30 TABLET, FILM COATED, EXTENDED RELEASE ORAL at 08:20

## 2019-01-01 RX ADMIN — SENNOSIDES AND DOCUSATE SODIUM 1 TABLET: 8.6; 5 TABLET ORAL at 08:21

## 2019-01-01 RX ADMIN — NIFEDIPINE 60 MG: 30 TABLET, FILM COATED, EXTENDED RELEASE ORAL at 09:00

## 2019-01-01 RX ADMIN — ACETAMINOPHEN 1000 MG: 500 TABLET, FILM COATED ORAL at 21:58

## 2019-01-01 RX ADMIN — OXYCODONE HYDROCHLORIDE 5 MG: 5 TABLET ORAL at 16:43

## 2019-01-01 RX ADMIN — FERROUS SULFATE TAB 325 MG (65 MG ELEMENTAL FE) 325 MG: 325 (65 FE) TAB at 08:20

## 2019-01-01 RX ADMIN — OMEPRAZOLE 40 MG: 20 CAPSULE, DELAYED RELEASE ORAL at 09:24

## 2019-01-01 RX ADMIN — ATORVASTATIN CALCIUM 40 MG: 20 TABLET, FILM COATED ORAL at 21:19

## 2019-01-01 RX ADMIN — IPRATROPIUM BROMIDE AND ALBUTEROL SULFATE 3 ML: .5; 3 SOLUTION RESPIRATORY (INHALATION) at 06:28

## 2019-01-01 RX ADMIN — GUAIFENESIN AND DEXTROMETHORPHAN 5 ML: 100; 10 SYRUP ORAL at 19:42

## 2019-01-01 RX ADMIN — LISINOPRIL 10 MG: 10 TABLET ORAL at 08:27

## 2019-01-01 RX ADMIN — GABAPENTIN 200 MG: 100 CAPSULE ORAL at 08:35

## 2019-01-01 RX ADMIN — ACETAMINOPHEN 1000 MG: 500 TABLET, FILM COATED ORAL at 19:41

## 2019-01-01 RX ADMIN — OXYCODONE HYDROCHLORIDE 10 MG: 10 TABLET, FILM COATED, EXTENDED RELEASE ORAL at 09:44

## 2019-01-01 RX ADMIN — ASPIRIN 81 MG 81 MG: 81 TABLET ORAL at 08:35

## 2019-01-01 RX ADMIN — DONEPEZIL HYDROCHLORIDE 5 MG: 5 TABLET ORAL at 21:22

## 2019-01-01 RX ADMIN — MELATONIN 25 MCG: at 09:24

## 2019-01-01 RX ADMIN — BUPIVACAINE HYDROCHLORIDE 50 MG: 5 INJECTION, SOLUTION EPIDURAL; INTRACAUDAL at 11:31

## 2019-01-01 RX ADMIN — ATORVASTATIN CALCIUM 40 MG: 20 TABLET, FILM COATED ORAL at 08:20

## 2019-01-01 RX ADMIN — OXYCODONE HYDROCHLORIDE 5 MG: 5 TABLET ORAL at 20:44

## 2019-01-01 RX ADMIN — FLUTICASONE PROPIONATE 1 SPRAY: 50 SPRAY, METERED NASAL at 09:38

## 2019-01-01 RX ADMIN — GABAPENTIN 300 MG: 300 CAPSULE ORAL at 19:41

## 2019-01-01 RX ADMIN — GABAPENTIN 200 MG: 100 CAPSULE ORAL at 09:24

## 2019-01-01 RX ADMIN — DULOXETINE HYDROCHLORIDE 30 MG: 30 CAPSULE, DELAYED RELEASE ORAL at 21:19

## 2019-01-01 RX ADMIN — SENNOSIDES AND DOCUSATE SODIUM 1 TABLET: 8.6; 5 TABLET ORAL at 20:39

## 2019-01-01 RX ADMIN — GUAIFENESIN AND DEXTROMETHORPHAN 5 ML: 100; 10 SYRUP ORAL at 14:21

## 2019-01-01 RX ADMIN — FLUTICASONE FUROATE AND VILANTEROL TRIFENATATE 1 PUFF: 100; 25 POWDER RESPIRATORY (INHALATION) at 08:36

## 2019-01-01 RX ADMIN — ACETAMINOPHEN 1000 MG: 500 TABLET, FILM COATED ORAL at 22:47

## 2019-01-01 RX ADMIN — GUAIFENESIN AND DEXTROMETHORPHAN 5 ML: 100; 10 SYRUP ORAL at 18:59

## 2019-01-01 RX ADMIN — SODIUM CHLORIDE: 9 INJECTION, SOLUTION INTRAVENOUS at 18:34

## 2019-01-01 RX ADMIN — DULOXETINE HYDROCHLORIDE 30 MG: 30 CAPSULE, DELAYED RELEASE ORAL at 22:22

## 2019-01-01 RX ADMIN — DONEPEZIL HYDROCHLORIDE 5 MG: 5 TABLET ORAL at 22:49

## 2019-01-01 RX ADMIN — BENZONATATE 100 MG: 100 CAPSULE ORAL at 02:33

## 2019-01-01 RX ADMIN — OXYCODONE HYDROCHLORIDE 5 MG: 5 TABLET ORAL at 07:18

## 2019-01-01 RX ADMIN — IPRATROPIUM BROMIDE AND ALBUTEROL SULFATE 3 ML: .5; 3 SOLUTION RESPIRATORY (INHALATION) at 18:24

## 2019-01-01 RX ADMIN — FLUTICASONE FUROATE AND VILANTEROL TRIFENATATE 1 PUFF: 100; 25 POWDER RESPIRATORY (INHALATION) at 09:29

## 2019-01-01 RX ADMIN — DULOXETINE HYDROCHLORIDE 30 MG: 30 CAPSULE, DELAYED RELEASE ORAL at 21:58

## 2019-01-01 RX ADMIN — ACETAMINOPHEN 1000 MG: 500 TABLET, FILM COATED ORAL at 15:36

## 2019-01-01 RX ADMIN — AZITHROMYCIN MONOHYDRATE 500 MG: 500 INJECTION, POWDER, LYOPHILIZED, FOR SOLUTION INTRAVENOUS at 14:56

## 2019-01-01 RX ADMIN — GABAPENTIN 200 MG: 100 CAPSULE ORAL at 22:48

## 2019-01-01 RX ADMIN — ONDANSETRON 4 MG: 2 INJECTION INTRAMUSCULAR; INTRAVENOUS at 10:55

## 2019-01-01 RX ADMIN — DONEPEZIL HYDROCHLORIDE 5 MG: 5 TABLET ORAL at 22:23

## 2019-01-01 RX ADMIN — ASPIRIN 81 MG 81 MG: 81 TABLET ORAL at 08:20

## 2019-01-01 RX ADMIN — DULOXETINE HYDROCHLORIDE 60 MG: 30 CAPSULE, DELAYED RELEASE ORAL at 07:54

## 2019-01-01 RX ADMIN — OMEPRAZOLE 40 MG: 20 CAPSULE, DELAYED RELEASE ORAL at 08:36

## 2019-01-01 RX ADMIN — FERROUS SULFATE TAB 325 MG (65 MG ELEMENTAL FE) 325 MG: 325 (65 FE) TAB at 09:25

## 2019-01-01 RX ADMIN — ASPIRIN 81 MG 81 MG: 81 TABLET ORAL at 07:53

## 2019-01-01 RX ADMIN — NIFEDIPINE 60 MG: 30 TABLET, FILM COATED, EXTENDED RELEASE ORAL at 08:53

## 2019-01-01 RX ADMIN — GABAPENTIN 300 MG: 300 CAPSULE ORAL at 08:53

## 2019-01-01 RX ADMIN — OXYCODONE HYDROCHLORIDE 10 MG: 10 TABLET, FILM COATED, EXTENDED RELEASE ORAL at 21:58

## 2019-01-01 RX ADMIN — OXYCODONE HYDROCHLORIDE 10 MG: 10 TABLET, FILM COATED, EXTENDED RELEASE ORAL at 11:18

## 2019-01-01 RX ADMIN — GABAPENTIN 200 MG: 100 CAPSULE ORAL at 07:54

## 2019-01-01 RX ADMIN — MELATONIN 25 MCG: at 07:50

## 2019-01-01 RX ADMIN — IPRATROPIUM BROMIDE AND ALBUTEROL SULFATE 3 ML: .5; 3 SOLUTION RESPIRATORY (INHALATION) at 07:55

## 2019-01-01 RX ADMIN — OMEPRAZOLE 40 MG: 20 CAPSULE, DELAYED RELEASE ORAL at 08:53

## 2019-01-01 RX ADMIN — NIFEDIPINE 60 MG: 30 TABLET, FILM COATED, EXTENDED RELEASE ORAL at 08:29

## 2019-01-01 RX ADMIN — DULOXETINE HYDROCHLORIDE 60 MG: 30 CAPSULE, DELAYED RELEASE ORAL at 08:53

## 2019-01-01 RX ADMIN — ASPIRIN 81 MG 81 MG: 81 TABLET ORAL at 08:27

## 2019-01-01 RX ADMIN — GUAIFENESIN AND DEXTROMETHORPHAN 5 ML: 100; 10 SYRUP ORAL at 09:11

## 2019-01-01 RX ADMIN — IPRATROPIUM BROMIDE AND ALBUTEROL SULFATE 3 ML: .5; 3 SOLUTION RESPIRATORY (INHALATION) at 11:16

## 2019-01-01 RX ADMIN — OXYCODONE HYDROCHLORIDE 5 MG: 5 TABLET ORAL at 15:30

## 2019-01-01 RX ADMIN — OXYCODONE HYDROCHLORIDE 10 MG: 10 TABLET, FILM COATED, EXTENDED RELEASE ORAL at 09:25

## 2019-01-01 RX ADMIN — SENNOSIDES AND DOCUSATE SODIUM 1 TABLET: 8.6; 5 TABLET ORAL at 11:26

## 2019-01-01 RX ADMIN — CEFTRIAXONE SODIUM 2 G: 2 INJECTION, SOLUTION INTRAVENOUS at 14:21

## 2019-01-01 RX ADMIN — DULOXETINE HYDROCHLORIDE 60 MG: 30 CAPSULE, DELAYED RELEASE ORAL at 08:27

## 2019-01-01 RX ADMIN — DONEPEZIL HYDROCHLORIDE 5 MG: 5 TABLET ORAL at 21:03

## 2019-01-01 RX ADMIN — ACETAMINOPHEN 1000 MG: 500 TABLET, FILM COATED ORAL at 07:50

## 2019-01-01 RX ADMIN — ACETAMINOPHEN 1000 MG: 500 TABLET, FILM COATED ORAL at 19:31

## 2019-01-01 RX ADMIN — ACETAMINOPHEN 1000 MG: 500 TABLET, FILM COATED ORAL at 08:53

## 2019-01-01 RX ADMIN — OXYCODONE HYDROCHLORIDE 5 MG: 5 TABLET ORAL at 19:24

## 2019-01-01 RX ADMIN — LISINOPRIL 10 MG: 10 TABLET ORAL at 08:53

## 2019-01-01 RX ADMIN — DULOXETINE HYDROCHLORIDE 60 MG: 30 CAPSULE, DELAYED RELEASE ORAL at 09:24

## 2019-01-01 RX ADMIN — IPRATROPIUM BROMIDE AND ALBUTEROL SULFATE 3 ML: .5; 3 SOLUTION RESPIRATORY (INHALATION) at 15:13

## 2019-01-01 RX ADMIN — PREDNISONE 40 MG: 20 TABLET ORAL at 08:27

## 2019-01-01 RX ADMIN — OXYCODONE HYDROCHLORIDE 10 MG: 10 TABLET, FILM COATED, EXTENDED RELEASE ORAL at 08:53

## 2019-01-01 RX ADMIN — IPRATROPIUM BROMIDE AND ALBUTEROL SULFATE 3 ML: .5; 3 SOLUTION RESPIRATORY (INHALATION) at 19:51

## 2019-01-01 RX ADMIN — BENZONATATE 100 MG: 100 CAPSULE ORAL at 14:19

## 2019-01-01 RX ADMIN — BENZONATATE 100 MG: 100 CAPSULE ORAL at 18:59

## 2019-01-01 RX ADMIN — DULOXETINE HYDROCHLORIDE 30 MG: 30 CAPSULE, DELAYED RELEASE ORAL at 22:37

## 2019-01-01 RX ADMIN — GABAPENTIN 300 MG: 300 CAPSULE ORAL at 19:30

## 2019-01-01 RX ADMIN — ATORVASTATIN CALCIUM 40 MG: 20 TABLET, FILM COATED ORAL at 07:51

## 2019-01-01 RX ADMIN — HYDROMORPHONE HYDROCHLORIDE 0.5 MG: 1 INJECTION, SOLUTION INTRAMUSCULAR; INTRAVENOUS; SUBCUTANEOUS at 10:56

## 2019-01-01 RX ADMIN — ASPIRIN 81 MG 81 MG: 81 TABLET ORAL at 08:53

## 2019-01-01 RX ADMIN — ACETAMINOPHEN 1000 MG: 500 TABLET, FILM COATED ORAL at 13:45

## 2019-01-01 RX ADMIN — DULOXETINE HYDROCHLORIDE 60 MG: 30 CAPSULE, DELAYED RELEASE ORAL at 08:20

## 2019-01-01 RX ADMIN — LISINOPRIL 10 MG: 10 TABLET ORAL at 09:01

## 2019-01-01 RX ADMIN — OXYCODONE HYDROCHLORIDE 5 MG: 5 TABLET ORAL at 18:34

## 2019-01-01 RX ADMIN — PREDNISONE 40 MG: 20 TABLET ORAL at 09:01

## 2019-01-01 RX ADMIN — ACETAMINOPHEN 1000 MG: 500 TABLET, FILM COATED ORAL at 08:20

## 2019-01-01 RX ADMIN — PREDNISONE 40 MG: 20 TABLET ORAL at 14:29

## 2019-01-01 RX ADMIN — ACETAMINOPHEN 1000 MG: 500 TABLET, FILM COATED ORAL at 08:35

## 2019-01-01 RX ADMIN — TIZANIDINE 2 MG: 2 TABLET ORAL at 07:55

## 2019-01-01 RX ADMIN — OXYCODONE HYDROCHLORIDE 10 MG: 10 TABLET, FILM COATED, EXTENDED RELEASE ORAL at 19:42

## 2019-01-01 RX ADMIN — ATORVASTATIN CALCIUM 40 MG: 20 TABLET, FILM COATED ORAL at 22:23

## 2019-01-01 RX ADMIN — LISINOPRIL 20 MG: 20 TABLET ORAL at 07:51

## 2019-01-01 RX ADMIN — FERROUS SULFATE TAB 325 MG (65 MG ELEMENTAL FE) 325 MG: 325 (65 FE) TAB at 07:52

## 2019-01-01 RX ADMIN — ACETAMINOPHEN 1000 MG: 500 TABLET, FILM COATED ORAL at 14:19

## 2019-01-01 RX ADMIN — GABAPENTIN 200 MG: 100 CAPSULE ORAL at 08:20

## 2019-01-01 RX ADMIN — IPRATROPIUM BROMIDE AND ALBUTEROL SULFATE 3 ML: .5; 3 SOLUTION RESPIRATORY (INHALATION) at 14:19

## 2019-01-01 RX ADMIN — SODIUM CHLORIDE, POTASSIUM CHLORIDE, SODIUM LACTATE AND CALCIUM CHLORIDE 500 ML: 600; 310; 30; 20 INJECTION, SOLUTION INTRAVENOUS at 06:06

## 2019-01-01 RX ADMIN — FLUTICASONE FUROATE AND VILANTEROL TRIFENATATE 1 PUFF: 100; 25 POWDER RESPIRATORY (INHALATION) at 08:19

## 2019-01-01 RX ADMIN — SODIUM CHLORIDE 100 ML: 9 INJECTION, SOLUTION INTRAVENOUS at 11:51

## 2019-01-01 RX ADMIN — GABAPENTIN 300 MG: 300 CAPSULE ORAL at 09:01

## 2019-01-01 RX ADMIN — OXYCODONE HYDROCHLORIDE 10 MG: 10 TABLET, FILM COATED, EXTENDED RELEASE ORAL at 09:38

## 2019-01-01 RX ADMIN — SENNOSIDES AND DOCUSATE SODIUM 1 TABLET: 8.6; 5 TABLET ORAL at 20:58

## 2019-01-01 RX ADMIN — ACETAMINOPHEN 1000 MG: 500 TABLET, FILM COATED ORAL at 09:23

## 2019-01-01 RX ADMIN — NIFEDIPINE 30 MG: 30 TABLET, FILM COATED, EXTENDED RELEASE ORAL at 08:35

## 2019-01-01 RX ADMIN — IPRATROPIUM BROMIDE AND ALBUTEROL SULFATE 3 ML: .5; 3 SOLUTION RESPIRATORY (INHALATION) at 19:26

## 2019-01-01 RX ADMIN — ACETAMINOPHEN 1000 MG: 500 TABLET, FILM COATED ORAL at 15:24

## 2019-01-01 RX ADMIN — DULOXETINE HYDROCHLORIDE 30 MG: 30 CAPSULE, DELAYED RELEASE ORAL at 22:47

## 2019-01-01 RX ADMIN — OXYCODONE HYDROCHLORIDE 10 MG: 10 TABLET, FILM COATED, EXTENDED RELEASE ORAL at 19:30

## 2019-01-01 RX ADMIN — ASPIRIN 81 MG 81 MG: 81 TABLET ORAL at 09:23

## 2019-01-01 RX ADMIN — NIFEDIPINE 30 MG: 30 TABLET, FILM COATED, EXTENDED RELEASE ORAL at 09:23

## 2019-01-01 RX ADMIN — NIFEDIPINE 30 MG: 30 TABLET, FILM COATED, EXTENDED RELEASE ORAL at 07:55

## 2019-01-01 RX ADMIN — ONDANSETRON 4 MG: 4 TABLET, ORALLY DISINTEGRATING ORAL at 09:58

## 2019-01-01 RX ADMIN — MELATONIN 25 MCG: at 08:20

## 2019-01-01 RX ADMIN — SODIUM CHLORIDE: 9 INJECTION, SOLUTION INTRAVENOUS at 04:21

## 2019-01-01 RX ADMIN — OXYCODONE HYDROCHLORIDE 10 MG: 10 TABLET, FILM COATED, EXTENDED RELEASE ORAL at 21:39

## 2019-01-01 RX ADMIN — OXYCODONE HYDROCHLORIDE 10 MG: 10 TABLET, FILM COATED, EXTENDED RELEASE ORAL at 22:47

## 2019-01-01 RX ADMIN — BENZONATATE 100 MG: 100 CAPSULE ORAL at 19:42

## 2019-01-01 RX ADMIN — GABAPENTIN 300 MG: 300 CAPSULE ORAL at 08:27

## 2019-01-01 RX ADMIN — ATORVASTATIN CALCIUM 40 MG: 20 TABLET, FILM COATED ORAL at 09:24

## 2019-01-01 RX ADMIN — FUROSEMIDE 40 MG: 40 TABLET ORAL at 15:24

## 2019-01-01 RX ADMIN — FUROSEMIDE 20 MG: 20 TABLET ORAL at 08:53

## 2019-01-01 RX ADMIN — IPRATROPIUM BROMIDE AND ALBUTEROL SULFATE 3 ML: .5; 3 SOLUTION RESPIRATORY (INHALATION) at 07:33

## 2019-01-01 RX ADMIN — SENNOSIDES AND DOCUSATE SODIUM 1 TABLET: 8.6; 5 TABLET ORAL at 08:35

## 2019-01-01 RX ADMIN — ACETAMINOPHEN 1000 MG: 500 TABLET, FILM COATED ORAL at 08:27

## 2019-01-01 RX ADMIN — DULOXETINE HYDROCHLORIDE 30 MG: 30 CAPSULE, DELAYED RELEASE ORAL at 20:24

## 2019-01-01 RX ADMIN — FERROUS SULFATE TAB 325 MG (65 MG ELEMENTAL FE) 325 MG: 325 (65 FE) TAB at 08:36

## 2019-01-01 RX ADMIN — FUROSEMIDE 20 MG: 20 TABLET ORAL at 09:02

## 2019-01-01 RX ADMIN — DONEPEZIL HYDROCHLORIDE 5 MG: 5 TABLET ORAL at 22:33

## 2019-01-01 RX ADMIN — SODIUM CHLORIDE: 9 INJECTION, SOLUTION INTRAVENOUS at 08:29

## 2019-01-01 RX ADMIN — OXYCODONE HYDROCHLORIDE 10 MG: 10 TABLET, FILM COATED, EXTENDED RELEASE ORAL at 09:02

## 2019-01-01 RX ADMIN — ACETAMINOPHEN 1000 MG: 500 TABLET, FILM COATED ORAL at 14:12

## 2019-01-01 RX ADMIN — ACETAMINOPHEN 1000 MG: 500 TABLET, FILM COATED ORAL at 20:57

## 2019-01-01 RX ADMIN — LIDOCAINE HYDROCHLORIDE 2 ML: 10 INJECTION, SOLUTION EPIDURAL; INFILTRATION; INTRACAUDAL; PERINEURAL at 11:32

## 2019-01-01 RX ADMIN — OMEPRAZOLE 40 MG: 20 CAPSULE, DELAYED RELEASE ORAL at 07:53

## 2019-01-01 RX ADMIN — NAPROXEN 250 MG: 250 TABLET ORAL at 11:26

## 2019-01-01 RX ADMIN — GABAPENTIN 300 MG: 300 CAPSULE ORAL at 19:42

## 2019-01-01 RX ADMIN — LIDOCAINE 1 PATCH: 560 PATCH PERCUTANEOUS; TOPICAL; TRANSDERMAL at 20:58

## 2019-01-01 RX ADMIN — ACETAMINOPHEN 1000 MG: 500 TABLET, FILM COATED ORAL at 09:01

## 2019-01-01 RX ADMIN — NAPROXEN 250 MG: 250 TABLET ORAL at 17:34

## 2019-01-01 RX ADMIN — LIDOCAINE 1 PATCH: 560 PATCH PERCUTANEOUS; TOPICAL; TRANSDERMAL at 21:37

## 2019-01-01 RX ADMIN — LISINOPRIL 20 MG: 20 TABLET ORAL at 21:58

## 2019-01-01 RX ADMIN — ASPIRIN 81 MG 81 MG: 81 TABLET ORAL at 09:02

## 2019-01-01 RX ADMIN — ATORVASTATIN CALCIUM 40 MG: 20 TABLET, FILM COATED ORAL at 20:25

## 2019-01-01 RX ADMIN — SENNOSIDES AND DOCUSATE SODIUM 1 TABLET: 8.6; 5 TABLET ORAL at 09:25

## 2019-01-01 RX ADMIN — IOPAMIDOL 81 ML: 755 INJECTION, SOLUTION INTRAVENOUS at 11:51

## 2019-01-01 RX ADMIN — DULOXETINE HYDROCHLORIDE 60 MG: 30 CAPSULE, DELAYED RELEASE ORAL at 08:36

## 2019-01-01 RX ADMIN — GABAPENTIN 200 MG: 100 CAPSULE ORAL at 20:39

## 2019-01-01 RX ADMIN — GABAPENTIN 200 MG: 100 CAPSULE ORAL at 21:58

## 2019-01-01 RX ADMIN — ATORVASTATIN CALCIUM 40 MG: 20 TABLET, FILM COATED ORAL at 08:35

## 2019-01-01 RX ADMIN — LISINOPRIL 20 MG: 20 TABLET ORAL at 22:48

## 2019-01-01 RX ADMIN — DONEPEZIL HYDROCHLORIDE 5 MG: 5 TABLET ORAL at 20:24

## 2019-01-01 RX ADMIN — DULOXETINE HYDROCHLORIDE 60 MG: 30 CAPSULE, DELAYED RELEASE ORAL at 09:01

## 2019-01-01 RX ADMIN — MELATONIN 25 MCG: at 08:35

## 2019-01-01 RX ADMIN — BENZONATATE 100 MG: 100 CAPSULE ORAL at 06:27

## 2019-01-01 RX ADMIN — DULOXETINE HYDROCHLORIDE 30 MG: 30 CAPSULE, DELAYED RELEASE ORAL at 21:03

## 2019-01-01 RX ADMIN — OMEPRAZOLE 40 MG: 20 CAPSULE, DELAYED RELEASE ORAL at 09:01

## 2019-01-01 RX ADMIN — DONEPEZIL HYDROCHLORIDE 5 MG: 5 TABLET ORAL at 21:58

## 2019-01-01 RX ADMIN — OXYCODONE HYDROCHLORIDE 10 MG: 10 TABLET, FILM COATED, EXTENDED RELEASE ORAL at 20:57

## 2019-01-01 RX ADMIN — IOHEXOL 2.5 ML: 300 INJECTION, SOLUTION INTRAVENOUS at 11:31

## 2019-01-01 RX ADMIN — PREDNISONE 40 MG: 20 TABLET ORAL at 08:53

## 2019-01-01 RX ADMIN — OXYCODONE HYDROCHLORIDE 10 MG: 10 TABLET, FILM COATED, EXTENDED RELEASE ORAL at 08:27

## 2019-01-01 RX ADMIN — FUROSEMIDE 40 MG: 40 TABLET ORAL at 07:54

## 2019-01-01 RX ADMIN — GUAIFENESIN AND DEXTROMETHORPHAN 5 ML: 100; 10 SYRUP ORAL at 06:26

## 2019-01-01 RX ADMIN — GABAPENTIN 200 MG: 100 CAPSULE ORAL at 20:58

## 2019-01-01 RX ADMIN — OMEPRAZOLE 40 MG: 20 CAPSULE, DELAYED RELEASE ORAL at 08:27

## 2019-01-01 RX ADMIN — ACETAMINOPHEN 1000 MG: 500 TABLET, FILM COATED ORAL at 20:39

## 2019-01-01 RX ADMIN — IPRATROPIUM BROMIDE AND ALBUTEROL SULFATE 3 ML: .5; 3 SOLUTION RESPIRATORY (INHALATION) at 14:22

## 2019-01-01 RX ADMIN — FLUTICASONE PROPIONATE 1 SPRAY: 50 SPRAY, METERED NASAL at 08:19

## 2019-01-01 RX ADMIN — FUROSEMIDE 20 MG: 20 TABLET ORAL at 08:27

## 2019-01-01 RX ADMIN — LIDOCAINE 1 PATCH: 560 PATCH PERCUTANEOUS; TOPICAL; TRANSDERMAL at 22:09

## 2019-01-01 RX ADMIN — ONDANSETRON 4 MG: 4 TABLET, ORALLY DISINTEGRATING ORAL at 10:51

## 2019-01-01 RX ADMIN — TRIAMCINOLONE ACETONIDE 40 MG: 40 INJECTION, SUSPENSION INTRA-ARTICULAR; INTRAMUSCULAR at 11:31

## 2019-01-01 RX ADMIN — IPRATROPIUM BROMIDE AND ALBUTEROL SULFATE 3 ML: .5; 3 SOLUTION RESPIRATORY (INHALATION) at 20:21

## 2019-01-01 ASSESSMENT — ENCOUNTER SYMPTOMS
LIGHT-HEADEDNESS: 0
COUGH: 1
VOMITING: 0
CHILLS: 1
FATIGUE: 1
NAUSEA: 0
COUGH: 0
SINUS PRESSURE: 0
DIARRHEA: 0
CONSTIPATION: 0
PALPITATIONS: 0
FREQUENCY: 0
DIARRHEA: 0
ABDOMINAL PAIN: 0
HEADACHES: 0
FEVER: 1
WHEEZING: 0
WHEEZING: 1
CHILLS: 0
SHORTNESS OF BREATH: 1
SHORTNESS OF BREATH: 0
DIAPHORESIS: 0
SORE THROAT: 0
VOMITING: 0
BLOOD IN STOOL: 0
NAUSEA: 0
DYSURIA: 0
HEADACHES: 0
ABDOMINAL PAIN: 0
CONSTIPATION: 0
MUSCULOSKELETAL NEGATIVE: 1
FEVER: 0
DIZZINESS: 0
WEAKNESS: 1
BACK PAIN: 1

## 2019-01-01 ASSESSMENT — ACTIVITIES OF DAILY LIVING (ADL)
ADLS_ACUITY_SCORE: 23
ADLS_ACUITY_SCORE: 28
ADLS_ACUITY_SCORE: 22
ADLS_ACUITY_SCORE: 27
ADLS_ACUITY_SCORE: 22
ADLS_ACUITY_SCORE: 28
ADLS_ACUITY_SCORE: 22
ADLS_ACUITY_SCORE: 29
ADLS_ACUITY_SCORE: 22
ADLS_ACUITY_SCORE: 29
ADLS_ACUITY_SCORE: 22
ADLS_ACUITY_SCORE: 28
ADLS_ACUITY_SCORE: 22
ADLS_ACUITY_SCORE: 23
ADLS_ACUITY_SCORE: 28
ADLS_ACUITY_SCORE: 22
ADLS_ACUITY_SCORE: 22
ADLS_ACUITY_SCORE: 28
ADLS_ACUITY_SCORE: 22
ADLS_ACUITY_SCORE: 27
ADLS_ACUITY_SCORE: 22
ADLS_ACUITY_SCORE: 22
ADLS_ACUITY_SCORE: 29
ADLS_ACUITY_SCORE: 22

## 2019-01-01 ASSESSMENT — PAIN SCALES - GENERAL
PAINLEVEL: SEVERE PAIN (6)
PAINLEVEL: MODERATE PAIN (5)
PAINLEVEL: MILD PAIN (2)

## 2019-01-01 ASSESSMENT — MIFFLIN-ST. JEOR
SCORE: 1257.36
SCORE: 1275.5
SCORE: 1284.57
SCORE: 1257.36
SCORE: 1290.13
SCORE: 1261.89
SCORE: 1295.75
SCORE: 1261.89

## 2019-01-02 ENCOUNTER — TRANSFERRED RECORDS (OUTPATIENT)
Dept: HEALTH INFORMATION MANAGEMENT | Facility: CLINIC | Age: 84
End: 2019-01-02

## 2019-01-03 ENCOUNTER — COMMUNICATION - HEALTHEAST (OUTPATIENT)
Dept: NEUROSURGERY | Facility: CLINIC | Age: 84
End: 2019-01-03

## 2019-01-03 ENCOUNTER — HOSPITAL LABORATORY (OUTPATIENT)
Facility: OTHER | Age: 84
End: 2019-01-03

## 2019-01-03 LAB
ANION GAP SERPL CALCULATED.3IONS-SCNC: 6 MMOL/L (ref 3–14)
BUN SERPL-MCNC: 29 MG/DL (ref 7–30)
CALCIUM SERPL-MCNC: 9.2 MG/DL (ref 8.5–10.1)
CHLORIDE SERPL-SCNC: 104 MMOL/L (ref 94–109)
CO2 SERPL-SCNC: 31 MMOL/L (ref 20–32)
CREAT SERPL-MCNC: 1.08 MG/DL (ref 0.52–1.04)
GFR SERPL CREATININE-BSD FRML MDRD: 46 ML/MIN/{1.73_M2}
GLUCOSE SERPL-MCNC: 157 MG/DL (ref 70–99)
HGB BLD-MCNC: 10.8 G/DL (ref 11.7–15.7)
POTASSIUM SERPL-SCNC: 3.3 MMOL/L (ref 3.4–5.3)
SODIUM SERPL-SCNC: 141 MMOL/L (ref 133–144)

## 2019-01-04 ENCOUNTER — RECORDS - HEALTHEAST (OUTPATIENT)
Dept: ADMINISTRATIVE | Facility: OTHER | Age: 84
End: 2019-01-04

## 2019-01-04 DIAGNOSIS — I65.21 STENOSIS OF RIGHT CAROTID ARTERY: ICD-10-CM

## 2019-01-04 DIAGNOSIS — I25.118 CORONARY ARTERY DISEASE OF NATIVE HEART WITH STABLE ANGINA PECTORIS, UNSPECIFIED VESSEL OR LESION TYPE (H): ICD-10-CM

## 2019-01-04 DIAGNOSIS — F32.0 MILD MAJOR DEPRESSION (H): ICD-10-CM

## 2019-01-04 DIAGNOSIS — K59.01 SLOW TRANSIT CONSTIPATION: ICD-10-CM

## 2019-01-04 DIAGNOSIS — I50.9 CONGESTIVE HEART FAILURE, UNSPECIFIED HF CHRONICITY, UNSPECIFIED HEART FAILURE TYPE (H): Primary | ICD-10-CM

## 2019-01-04 DIAGNOSIS — G30.9 ALZHEIMER'S DEMENTIA WITHOUT BEHAVIORAL DISTURBANCE, UNSPECIFIED TIMING OF DEMENTIA ONSET: ICD-10-CM

## 2019-01-04 DIAGNOSIS — M51.369 DDD (DEGENERATIVE DISC DISEASE), LUMBAR: ICD-10-CM

## 2019-01-04 DIAGNOSIS — K21.9 GASTROESOPHAGEAL REFLUX DISEASE, ESOPHAGITIS PRESENCE NOT SPECIFIED: ICD-10-CM

## 2019-01-04 DIAGNOSIS — F02.80 ALZHEIMER'S DEMENTIA WITHOUT BEHAVIORAL DISTURBANCE, UNSPECIFIED TIMING OF DEMENTIA ONSET: ICD-10-CM

## 2019-01-04 RX ORDER — DONEPEZIL HYDROCHLORIDE 5 MG/1
TABLET, FILM COATED ORAL
Qty: 31 TABLET | Refills: 98 | Status: SHIPPED | OUTPATIENT
Start: 2019-01-04 | End: 2019-01-14

## 2019-01-04 RX ORDER — GABAPENTIN 100 MG/1
CAPSULE ORAL
Qty: 93 CAPSULE | Refills: 98 | Status: SHIPPED | OUTPATIENT
Start: 2019-01-04 | End: 2019-01-14

## 2019-01-04 RX ORDER — ISRADIPINE 5 MG/1
CAPSULE ORAL
Qty: 62 CAPSULE | Refills: 98 | Status: SHIPPED | OUTPATIENT
Start: 2019-01-04 | End: 2019-04-09

## 2019-01-04 RX ORDER — FUROSEMIDE 20 MG
TABLET ORAL
Qty: 31 TABLET | Refills: 98 | Status: SHIPPED | OUTPATIENT
Start: 2019-01-04 | End: 2019-01-14

## 2019-01-04 RX ORDER — ISOSORBIDE MONONITRATE 30 MG/1
TABLET, EXTENDED RELEASE ORAL
Qty: 31 TABLET | Refills: 98 | Status: SHIPPED | OUTPATIENT
Start: 2019-01-04 | End: 2019-04-09

## 2019-01-04 RX ORDER — MEMANTINE HYDROCHLORIDE 5 MG/1
TABLET ORAL
Qty: 31 TABLET | Refills: 98 | Status: SHIPPED | OUTPATIENT
Start: 2019-01-04 | End: 2019-04-09

## 2019-01-04 RX ORDER — ATORVASTATIN CALCIUM 40 MG/1
TABLET, FILM COATED ORAL
Qty: 31 TABLET | Refills: 98 | Status: SHIPPED | OUTPATIENT
Start: 2019-01-04 | End: 2019-03-12

## 2019-01-04 RX ORDER — DULOXETIN HYDROCHLORIDE 30 MG/1
CAPSULE, DELAYED RELEASE ORAL
Qty: 31 CAPSULE | Refills: 98 | Status: SHIPPED | OUTPATIENT
Start: 2019-01-04 | End: 2019-01-14

## 2019-01-04 RX ORDER — METHYLPREDNISOLONE 4 MG/1
TABLET ORAL
Qty: 31 TABLET | Refills: 98 | Status: SHIPPED | OUTPATIENT
Start: 2019-01-04 | End: 2019-01-14

## 2019-01-04 RX ORDER — DOCUSATE SODIUM AND SENNOSIDES 8.6; 5 MG/1; MG/1
TABLET, FILM COATED ORAL
Qty: 62 TABLET | Refills: 98 | Status: SHIPPED | OUTPATIENT
Start: 2019-01-04 | End: 2019-01-14

## 2019-01-04 RX ORDER — FUROSEMIDE 40 MG
TABLET ORAL
Qty: 31 TABLET | Refills: 98 | Status: SHIPPED | OUTPATIENT
Start: 2019-01-04 | End: 2019-01-14

## 2019-01-04 RX ORDER — OMEPRAZOLE 40 MG/1
CAPSULE, DELAYED RELEASE ORAL
Qty: 31 CAPSULE | Refills: 98 | Status: SHIPPED | OUTPATIENT
Start: 2019-01-04 | End: 2019-01-14

## 2019-01-07 ENCOUNTER — COMMUNICATION - HEALTHEAST (OUTPATIENT)
Dept: NEUROSURGERY | Facility: CLINIC | Age: 84
End: 2019-01-07

## 2019-01-08 ENCOUNTER — COMMUNICATION - HEALTHEAST (OUTPATIENT)
Dept: NEUROSURGERY | Facility: CLINIC | Age: 84
End: 2019-01-08

## 2019-01-09 ENCOUNTER — ANESTHESIA - HEALTHEAST (OUTPATIENT)
Dept: SURGERY | Facility: CLINIC | Age: 84
End: 2019-01-09

## 2019-01-09 ENCOUNTER — SURGERY - HEALTHEAST (OUTPATIENT)
Dept: SURGERY | Facility: CLINIC | Age: 84
End: 2019-01-09

## 2019-01-09 ASSESSMENT — MIFFLIN-ST. JEOR
SCORE: 1291.36
SCORE: 1288.18

## 2019-01-12 ASSESSMENT — MIFFLIN-ST. JEOR: SCORE: 1283.64

## 2019-01-14 ENCOUNTER — NURSING HOME VISIT (OUTPATIENT)
Dept: GERIATRICS | Facility: CLINIC | Age: 84
End: 2019-01-14
Payer: COMMERCIAL

## 2019-01-14 ENCOUNTER — COMMUNICATION - HEALTHEAST (OUTPATIENT)
Dept: NEUROSURGERY | Facility: CLINIC | Age: 84
End: 2019-01-14

## 2019-01-14 ENCOUNTER — HOSPITAL LABORATORY (OUTPATIENT)
Facility: OTHER | Age: 84
End: 2019-01-14

## 2019-01-14 VITALS
SYSTOLIC BLOOD PRESSURE: 188 MMHG | HEART RATE: 71 BPM | RESPIRATION RATE: 20 BRPM | TEMPERATURE: 97.8 F | DIASTOLIC BLOOD PRESSURE: 76 MMHG | WEIGHT: 201 LBS | BODY MASS INDEX: 35.61 KG/M2 | HEIGHT: 63 IN

## 2019-01-14 DIAGNOSIS — R53.81 PHYSICAL DECONDITIONING: ICD-10-CM

## 2019-01-14 DIAGNOSIS — Z86.73 H/O: CVA (CEREBROVASCULAR ACCIDENT): ICD-10-CM

## 2019-01-14 DIAGNOSIS — I10 BENIGN ESSENTIAL HYPERTENSION: ICD-10-CM

## 2019-01-14 DIAGNOSIS — M48.061 SPINAL STENOSIS OF LUMBAR REGION, UNSPECIFIED WHETHER NEUROGENIC CLAUDICATION PRESENT: Primary | ICD-10-CM

## 2019-01-14 DIAGNOSIS — G30.9 ALZHEIMER'S DEMENTIA WITHOUT BEHAVIORAL DISTURBANCE, UNSPECIFIED TIMING OF DEMENTIA ONSET: ICD-10-CM

## 2019-01-14 DIAGNOSIS — F02.80 ALZHEIMER'S DEMENTIA WITHOUT BEHAVIORAL DISTURBANCE, UNSPECIFIED TIMING OF DEMENTIA ONSET: ICD-10-CM

## 2019-01-14 DIAGNOSIS — I50.9 CONGESTIVE HEART FAILURE, UNSPECIFIED HF CHRONICITY, UNSPECIFIED HEART FAILURE TYPE (H): ICD-10-CM

## 2019-01-14 DIAGNOSIS — N18.30 CKD (CHRONIC KIDNEY DISEASE) STAGE 3, GFR 30-59 ML/MIN (H): ICD-10-CM

## 2019-01-14 DIAGNOSIS — G89.18 ACUTE POST-OPERATIVE PAIN: ICD-10-CM

## 2019-01-14 DIAGNOSIS — K59.01 SLOW TRANSIT CONSTIPATION: ICD-10-CM

## 2019-01-14 DIAGNOSIS — D62 ANEMIA DUE TO BLOOD LOSS, ACUTE: ICD-10-CM

## 2019-01-14 DIAGNOSIS — K21.9 GASTROESOPHAGEAL REFLUX DISEASE, ESOPHAGITIS PRESENCE NOT SPECIFIED: ICD-10-CM

## 2019-01-14 DIAGNOSIS — Z98.890 S/P LUMBAR LAMINECTOMY: ICD-10-CM

## 2019-01-14 DIAGNOSIS — F32.0 MILD MAJOR DEPRESSION (H): ICD-10-CM

## 2019-01-14 DIAGNOSIS — J44.9 CHRONIC OBSTRUCTIVE PULMONARY DISEASE, UNSPECIFIED COPD TYPE (H): ICD-10-CM

## 2019-01-14 DIAGNOSIS — E78.5 HYPERLIPIDEMIA, UNSPECIFIED HYPERLIPIDEMIA TYPE: ICD-10-CM

## 2019-01-14 LAB
ANION GAP SERPL CALCULATED.3IONS-SCNC: 4 MMOL/L (ref 3–14)
BUN SERPL-MCNC: 24 MG/DL (ref 7–30)
CALCIUM SERPL-MCNC: 8.9 MG/DL (ref 8.5–10.1)
CHLORIDE SERPL-SCNC: 108 MMOL/L (ref 94–109)
CO2 SERPL-SCNC: 30 MMOL/L (ref 20–32)
CREAT SERPL-MCNC: 1.03 MG/DL (ref 0.52–1.04)
GFR SERPL CREATININE-BSD FRML MDRD: 49 ML/MIN/{1.73_M2}
GLUCOSE SERPL-MCNC: 87 MG/DL (ref 70–99)
POTASSIUM SERPL-SCNC: 4.7 MMOL/L (ref 3.4–5.3)
SODIUM SERPL-SCNC: 142 MMOL/L (ref 133–144)

## 2019-01-14 PROCEDURE — 99316 NF DSCHRG MGMT 30 MIN+: CPT | Performed by: NURSE PRACTITIONER

## 2019-01-14 RX ORDER — LISINOPRIL 5 MG/1
5 TABLET ORAL DAILY
COMMUNITY
End: 2019-01-25

## 2019-01-14 RX ORDER — AMOXICILLIN 250 MG
1 CAPSULE ORAL 2 TIMES DAILY PRN
Status: ON HOLD | COMMUNITY
End: 2019-01-01

## 2019-01-14 RX ORDER — HYDROCODONE BITARTRATE AND ACETAMINOPHEN 5; 325 MG/1; MG/1
1 TABLET ORAL 3 TIMES DAILY
Status: ON HOLD | COMMUNITY
End: 2019-02-22

## 2019-01-14 ASSESSMENT — MIFFLIN-ST. JEOR: SCORE: 1320.86

## 2019-01-14 NOTE — PROGRESS NOTES
[facility]  PRIMARY CARE PROVIDER AND CLINIC:  Junie Estrella [address] [address]  Chief Complaint   Patient presents with     Hospital F/U     [facility] Medical Record Number:  [id number]  Place of Service where encounter took place:  [facility] [[id number]]    HPI:    Jazmin Clifton is a 86 year old  (12/30/1932),admitted to the above facility from  [facility] .  Hospital stay 1/9/19 through 1/12/19.  Admitted to this facility for  rehab, medical management and nursing care.  HPI information obtained from: facility chart records, facility staff, patient report and [facility] Epic chart review.      Jazmin Clifton is a 86 y.o. female well known to provider with PMH of CHF, CKD, COPD, HTN, dementia, CVA, anemia and HLD who underwent LUMBAR 1-LUMBAR 2 BILATERAL LAMINECTOMY AND MEDIAL FACETECTOMY. Surgery was without complications. Postoperatively complicated by the development of UE tremors. Her pain medications and gabapentin were adjusted with some improvement. She did have elevation in her creatinine, but it returned to baseline by time of discharge.  Some doses were changed with her pain medications as well as her gabapentin. Her creatinine increased, so lasix placed on hold. Creatinine returned to baseline and lasix was resumed on discharge. She reported her mobility and pain were improved since prior to surgery. When medically stable she was discharged to TCU for further rehab and medical management.     Current issues are:         Spinal stenosis of lumbar region, unspecified whether neurogenic claudication present  S/P lumbar laminectomy  Acute post-operative pain  Congestive heart failure, unspecified HF chronicity, unspecified heart failure type (H)  CKD (chronic kidney disease) stage 3, GFR 30-59 ml/min (H)  Chronic obstructive pulmonary disease, unspecified COPD type (H)  Benign essential hypertension  Slow transit  constipation  Alzheimer's dementia without behavioral disturbance, unspecified timing of dementia onset  Mild major depression (H)  Gastroesophageal reflux disease, esophagitis presence not specified  H/O: CVA (cerebrovascular accident)  Hyperlipidemia, unspecified hyperlipidemia type  Anemia due to blood loss, acute   Reports she is having significant pain - 8/10 this AM. She has not received pain medication since about 1 AM. Pain is limited her movement. She does not think she has really walked since surgery - has been using the wheelchair to get in to the bathroom in TCU, but she is a poor historian. Denies any numbness or tingling or shooting pain into her legs. Does not feel constipated, thinks she has had a bowel movement. Does have a chronic cough, but she thinks it is worse than baseline. Denies any SOB or chest pain. Slept well last night. Has not noticed any tremors today, is upset that she is having so much pain today as she thought things would be better after her surgery.    CODE STATUS/ADVANCE DIRECTIVES DISCUSSION:   CPR/Full code   Patient's living condition: lives in an assisted living facility    ALLERGIES:Accupril; Ace inhibitors; Augmentin; Levofloxacin; Macrobid [nitrofurantoin]; Morphine; Norvasc [amlodipine besylate]; and Quinapril  PAST MEDICAL HISTORY:  has a past medical history of ABDOMINAL PAIN GENERALIZED (3/15/2006), Abdominal pain, generalized (3/15/2006), Atherosclerosis of renal artery (H), BENIGN HYPERTENSION (5/1/2006), Benign neoplasm of scalp and skin of neck, Cerebral aneurysm, nonruptured, Depressive disorder, not elsewhere classified, Esophageal reflux, Female stress incontinence, Gastrointestinal malfunction arising from mental factors, Generalized osteoarthrosis, unspecified site, Herpes zoster dermatitis of eyelid, Insomnia, unspecified, Lumbago, Other chest pain, Other specified cardiac dysrhythmias(427.89), Rectocele, Unspecified disorder of kidney and ureter, and  Unspecified essential hypertension.  PAST SURGICAL HISTORY:  has a past surgical history that includes surgical history of - ; surgical history of - ; surgical history of -  (1996); surgical history of - ; surgical history of - ; cholecystectomy, laporoscopic (1997); hysterectomy, mirtha (1982); and Endarterectomy carotid (Right, 8/31/2017).  FAMILY HISTORY: family history includes Asthma in her son; Cancer in her brother and mother; Cerebrovascular Disease in her father; Diabetes in her son; Eye Disorder in her son; Gastrointestinal Disease in her son; Heart Disease in her father.  SOCIAL HISTORY:  reports that she has quit smoking. She does not have any smokeless tobacco history on file. She reports that she drinks alcohol. She reports that she does not use drugs.    Post Discharge Medication Reconciliation Status: discharge medications reconciled and changed, per note/orders (see AVS).  Current Outpatient Medications   Medication Sig Dispense Refill     acetaminophen (TYLENOL) 325 MG tablet Take 650 mg by mouth every 4 hours as needed for mild pain       Alendronate Sodium (FOSAMAX PO) Take 10 mg by mouth       Ascorbic Acid (VITAMIN C PO) Take 500 mg by mouth daily       aspirin 81 MG chewable tablet Take 81 mg by mouth       atorvastatin (LIPITOR) 40 MG tablet TAKE 1 TABLET BY MOUTH ONCE DAILY 31 tablet 98     CALCIUM 600+D 600-200 MG-UNIT TABS TAKE 1 TAB BY MOUTH ONCE DAILY 30 tablet 11     calcium carbonate (TUMS) 500 MG chewable tablet Take 1 chew tab by mouth every hour as needed for heartburn       DONEPEZIL HCL PO Take 5 mg by mouth daily       DULoxetine HCl (CYMBALTA PO) Take 30 mg by mouth daily       fluticasone (FLONASE) 50 MCG/ACT spray Spray 1 spray into both nostrils 2 times daily as needed        fluticasone-vilanterol (BREO ELLIPTA) 100-25 MCG/INH oral inhaler Inhale 1 puff into the lungs daily       Furosemide (LASIX PO) Take 40 mg by mouth daily And 20mg q afternoon.       GABAPENTIN PO Take 100  mg by mouth every morning       GABAPENTIN PO Take 200 mg by mouth At Bedtime        guaiFENesin (ROBITUSSIN) 100 MG/5ML SYRP Take 10 mLs by mouth every 4 hours as needed for cough        HYDROcodone-acetaminophen (NORCO) 5-325 MG tablet Take 1 tablet by mouth 3 times daily       HYDROcodone-acetaminophen (NORCO) 5-325 MG tablet Take 1-2 tablets by mouth every 4 hours as needed for severe pain        hydrocortisone 1 % CREA cream Apply topically every 6 hours as needed for itching       isosorbide mononitrate (IMDUR) 30 MG 24 hr tablet TAKE 1 TABLET BY MOUTH ONCE DAILY 31 tablet 98     isradipine (DYNACIRC) 5 MG capsule TAKE 1 CAPSULE BY MOUTH TWICE DAILY 62 capsule 98     levalbuterol (XOPENEX) 1.25 MG/3ML neb solution Take 1 ampule by nebulization every 4 hours as needed for shortness of breath / dyspnea or wheezing       lisinopril (PRINIVIL/ZESTRIL) 5 MG tablet Take 5 mg by mouth daily       loperamide (IMODIUM) 2 MG capsule Take 2 mg by mouth 4 times daily as needed for diarrhea       magnesium hydroxide (MILK OF MAGNESIA) 400 MG/5ML suspension Take 30 mLs by mouth daily as needed for constipation or heartburn       MAGNESIUM OXIDE PO Take 250 mg by mouth daily       memantine (NAMENDA) 5 MG tablet TAKE 1 TABLET BY MOUTH ONCE DAILY 31 tablet 98     Menthol, Topical Analgesic, (BIOFREEZE) 4 % GEL Externally apply topically daily And Three times a day PRN       methylPREDNISolone (MEDROL DOSEPAK) 4 MG tablet Take 4 mg by mouth daily follow package directions       nystatin (MYCOSTATIN) 623723 UNIT/GM POWD Apply topically 2 times daily as needed        OMEPRAZOLE PO Take 40 mg by mouth every morning       Ondansetron (ZOFRAN ODT PO) Take 4 mg by mouth every 8 hours as needed for nausea       polyethylene glycol (MIRALAX/GLYCOLAX) Packet Take 17 g by mouth daily as needed        polyvinyl alcohol (LIQUIFILM TEARS) 1.4 % ophthalmic solution Place 1 drop into both eyes 2 times daily And 4 times a day PRN        "senna-docusate (SENOKOT-S/PERICOLACE) 8.6-50 MG tablet Take 1 tablet by mouth 2 times daily And 1 tab every day PRN       SIMETHICONE-80 PO Take 160 mg by mouth 4 times daily as needed for intestinal gas          ROS:  10 point ROS of systems including Constitutional, Eyes, Respiratory, Cardiovascular, Gastroenterology, Genitourinary, Integumentary, Musculoskeletal, Psychiatric were all negative except for pertinent positives noted in my HPI.    Exam:  /76   Pulse 71   Temp 97.8  F (36.6  C)   Resp 20   Ht 1.6 m (5' 3\")   Wt 91.2 kg (201 lb)   BMI 35.61 kg/m    GENERAL APPEARANCE:  Alert, in no distress, oriented, morbidly obese, cooperative  RESP:  respiratory effort and palpation of chest normal, lungs clear to auscultation , no respiratory distress, diminished breath sounds bilat bases, on RA  CV:  Palpation and auscultation of heart done , regular rate and rhythm, no murmur, rub, or gallop, +2 pedal pulses, peripheral edema 1-2+ in BLE  ABDOMEN:  normal bowel sounds, soft, nontender, no hepatosplenomegaly or other masses, no guarding or rebound  M/S:   PETERSEN purposefully, generalized weakness, decreased ROM to lumbar spine  SKIN:  Inspection of skin and subcutaneous tissue baseline, wound no visualized as patient unable to reposition d/t pain  NEURO:   Cranial nerves 2-12 are normal tested and grossly at patient's baseline, Examination of sensation by touch normal  PSYCH:  oriented X 3, affect and mood normal, memory fair    Lab/Diagnostic data:    CBC RESULTS:   Recent Labs   Lab Test 01/03/19  0941 11/23/18  1042 11/14/18  0802   WBC  --  9.1 7.6   RBC  --  4.05 3.70*   HGB 10.8* 11.1* 9.8*   HCT  --  36.8 32.9*   MCV  --  91 89   MCH  --  27.4 26.5   MCHC  --  30.2* 29.8*   RDW  --  17.0* 16.8*   PLT  --  202 193       Last Basic Metabolic Panel:  Recent Labs   Lab Test 01/14/19  0651 01/03/19  0941    141   POTASSIUM 4.7 3.3*   CHLORIDE 108 104   BLAIR 8.9 9.2   CO2 30 31   BUN 24 29   CR 1.03 " 1.08*   GLC 87 157*       Liver Function Studies -   Recent Labs   Lab Test 06/25/18  1020   ALBUMIN 3.5       TSH   Date Value Ref Range Status   08/14/2017 0.62 0.40 - 4.00 mU/L Final   04/18/2007 0.60 0.4 - 5.0 mU/L Final       Lab Results   Component Value Date    A1C 6.2 08/31/2017       ASSESSMENT/PLAN:  (M48.061) Spinal stenosis of lumbar region, unspecified whether neurogenic claudication present  (primary encounter diagnosis)  (Z98.890) S/P lumbar laminectomy  (G89.18) Acute post-operative pain  Physical deconditioning  Comment: pain uncontrolled, no s/s of infection, neurologically stable  Plan: Resume PRA norco TID, will increased PRN dose to 1-2 tabs q4 h PRN (she is not narcotic naive), but max 2 tabs in 4 hours, gabapentin 100mg qAM, 200mg at bedtime, APAP 650mg q4h pRN - mas 4g in 24 hours,  Continue methylrednisolone taper, biofreeze BID and PRN, PT/OT to eval and treat, f/u with neurosurgery in 6 weeks, staff to clarify if staples can be removed in TCU in 2 weeks to avoid extra visit to clinic for removal    (I50.9) Congestive heart failure, unspecified HF chronicity, unspecified heart failure type (H)  Comment: no s/s of exacerbation, lasix held during hospital stay. Of note weight is up 10lbs since discharge from TCU 12/2018  Plan: Continue lasix 40mg q Am and 20mg q afternoon, BP elevated in TCU and creatinine/K+ stable at hospital discharge so will restart lisinopril at 5mg (discontinued during previous [facility] stay d/t hyperkalemia), imdur 30mg daily, ASA 81mg daily, daily weights, heart healthy diet.     (N18.3) CKD (chronic kidney disease) stage 3, GFR 30-59 ml/min (H)  Comment: elevated as high as 2.44, improved to 1.17 prior to discharge ; baseline 1.1-1.3.   Plan: Avoid nephrotoxic medications, BMP to check for stability.     (J44.9) Chronic obstructive pulmonary disease, unspecified COPD type (H)  Comment: appears stable, d/t error on order from previous TERRA orders and patient request not  resume Anoro Ellipta stopped in December.   Plan: Encourage use of IS, Breo Ellipta 1 puff daily, levalbuterol nebs PRN, monitor    (I10) Benign essential hypertension  Comment: elevated in TCU, suspect pain contributing  Plan: Restart lisinopril 5mg daily, isradipine 5mg daily, imdur 30mg daily lasix 40mg q AM, 20mg q afternoon, monitor and adjusts    (K59.01) Slow transit constipation  Comment: having BM's  Plan: miralax daily as needed, senna-s 1 tab BID and 1 tab daily PRN.     (G30.9,  F02.80) Alzheimer's dementia without behavioral disturbance, unspecified timing of dementia onset  Comment: lives in snf, poor historian at baseline  Plan: continue memantine 5mg daily, donepezil 5mg daily, monitor    (F32.0) Mild major depression (H)  Comment: somewhat distressed today, likely d/t pain; reports her mood has been stable  Plan: continue duloxetine 30mg daily    (K21.9) Gastroesophageal reflux disease, esophagitis presence not specified  Comment: asymptomatic currently,   Plan: omeprazole 40mg daily,     (Z86.73) H/O: CVA (cerebrovascular accident)  Comment: neurologically at baseline  Plan: ASA, statin,     (E78.5) Hyperlipidemia, unspecified hyperlipidemia type  Comment: stable  Plan: continue statin,     (D62) Anemia due to blood loss, acute  Comment: acute on chronic, baseline in 10's range. Suspect CKD contributing; 9.2 post-operatively. No s/s of bleeding currently   Plan: CBC to check for stability, monitor for s/s of bleeding    Orders:  1. Change Norco 5/325 mg to 1 tab PO TID an 1-2 tab PO Q 4 hours PRN   2. Lisinopril 5 mg PO daily Dx: CHF, HTN  3. BMP on 1/21 Dx: CKD Hgb on 1.21 Dx: Anemia   4. Encourage use of IS while awake   5. Ok to take norco and isradipine -not allergic has tolerated without issue     Total time spent with patient visit at the [facility] nursing facility was 40 including patient visit and review of past records. Greater than 50% of total time spent with counseling and coordinating  care due to pain mangement, plan of of care in TCU, patient education d/t CHF, COPD, uncontrolled pain s/p laminectomy    Electronically signed by:  MARY Gómez CNP

## 2019-01-14 NOTE — LETTER
1/14/2019        RE: Jazmin Rich [place]  [address]  [place] [address]        [facility]  PRIMARY CARE PROVIDER AND CLINIC:  Junie Estrella [address] [place] [address]  Chief Complaint   Patient presents with     Hospital F/U     [facility] Record Number:  [id number]  Place of Service where encounter took place:  [facility] ([facility]) [[id number]]    HPI:    Jazmin Clifton is a 86 year old  (12/30/1932),admitted to the above facility from  [facility] .  Hospital stay 1/9/19 through 1/12/19.  Admitted to this facility for  rehab, medical management and nursing care.  HPI information obtained from: facility chart records, facility staff, patient report and [facility] chart review.      Jazmin Clifton is a 86 y.o. female well known to provider with PMH of CHF, CKD, COPD, HTN, dementia, CVA, anemia and HLD who underwent LUMBAR 1-LUMBAR 2 BILATERAL LAMINECTOMY AND MEDIAL FACETECTOMY. Surgery was without complications. Postoperatively complicated by the development of UE tremors. Her pain medications and gabapentin were adjusted with some improvement. She did have elevation in her creatinine, but it returned to baseline by time of discharge.  Some doses were changed with her pain medications as well as her gabapentin. Her creatinine increased, so lasix placed on hold. Creatinine returned to baseline and lasix was resumed on discharge. She reported her mobility and pain were improved since prior to surgery. When medically stable she was discharged to TCU for further rehab and medical management.     Current issues are:         Spinal stenosis of lumbar region, unspecified whether neurogenic claudication present  S/P lumbar laminectomy  Acute post-operative pain  Congestive heart failure, unspecified HF chronicity, unspecified heart failure type (H)  CKD (chronic kidney disease) stage 3, GFR 30-59 ml/min  (H)  Chronic obstructive pulmonary disease, unspecified COPD type (H)  Benign essential hypertension  Slow transit constipation  Alzheimer's dementia without behavioral disturbance, unspecified timing of dementia onset  Mild major depression (H)  Gastroesophageal reflux disease, esophagitis presence not specified  H/O: CVA (cerebrovascular accident)  Hyperlipidemia, unspecified hyperlipidemia type  Anemia due to blood loss, acute   Reports she is having significant pain - 8/10 this AM. She has not received pain medication since about 1 AM. Pain is limited her movement. She does not think she has really walked since surgery - has been using the wheelchair to get in to the bathroom in TCU, but she is a poor historian. Denies any numbness or tingling or shooting pain into her legs. Does not feel constipated, thinks she has had a bowel movement. Does have a chronic cough, but she thinks it is worse than baseline. Denies any SOB or chest pain. Slept well last night. Has not noticed any tremors today, is upset that she is having so much pain today as she thought things would be better after her surgery.    CODE STATUS/ADVANCE DIRECTIVES DISCUSSION:   CPR/Full code   Patient's living condition: lives in an assisted living facility    ALLERGIES:Accupril; Ace inhibitors; Augmentin; Levofloxacin; Macrobid [nitrofurantoin]; Morphine; Norvasc [amlodipine besylate]; and Quinapril  PAST MEDICAL HISTORY:  has a past medical history of ABDOMINAL PAIN GENERALIZED (3/15/2006), Abdominal pain, generalized (3/15/2006), Atherosclerosis of renal artery (H), BENIGN HYPERTENSION (5/1/2006), Benign neoplasm of scalp and skin of neck, Cerebral aneurysm, nonruptured, Depressive disorder, not elsewhere classified, Esophageal reflux, Female stress incontinence, Gastrointestinal malfunction arising from mental factors, Generalized osteoarthrosis, unspecified site, Herpes zoster dermatitis of eyelid, Insomnia, unspecified, Lumbago, Other chest  pain, Other specified cardiac dysrhythmias(427.89), Rectocele, Unspecified disorder of kidney and ureter, and Unspecified essential hypertension.  PAST SURGICAL HISTORY:  has a past surgical history that includes surgical history of - ; surgical history of - ; surgical history of -  (1996); surgical history of - ; surgical history of - ; cholecystectomy, laporoscopic (1997); hysterectomy, mirtha (1982); and Endarterectomy carotid (Right, 8/31/2017).  FAMILY HISTORY: family history includes Asthma in her son; Cancer in her brother and mother; Cerebrovascular Disease in her father; Diabetes in her son; Eye Disorder in her son; Gastrointestinal Disease in her son; Heart Disease in her father.  SOCIAL HISTORY:  reports that she has quit smoking. She does not have any smokeless tobacco history on file. She reports that she drinks alcohol. She reports that she does not use drugs.    Post Discharge Medication Reconciliation Status: discharge medications reconciled and changed, per note/orders (see AVS).  Current Outpatient Medications   Medication Sig Dispense Refill     acetaminophen (TYLENOL) 325 MG tablet Take 650 mg by mouth every 4 hours as needed for mild pain       Alendronate Sodium (FOSAMAX PO) Take 10 mg by mouth       Ascorbic Acid (VITAMIN C PO) Take 500 mg by mouth daily       aspirin 81 MG chewable tablet Take 81 mg by mouth       atorvastatin (LIPITOR) 40 MG tablet TAKE 1 TABLET BY MOUTH ONCE DAILY 31 tablet 98     CALCIUM 600+D 600-200 MG-UNIT TABS TAKE 1 TAB BY MOUTH ONCE DAILY 30 tablet 11     calcium carbonate (TUMS) 500 MG chewable tablet Take 1 chew tab by mouth every hour as needed for heartburn       DONEPEZIL HCL PO Take 5 mg by mouth daily       DULoxetine HCl (CYMBALTA PO) Take 30 mg by mouth daily       fluticasone (FLONASE) 50 MCG/ACT spray Spray 1 spray into both nostrils 2 times daily as needed        fluticasone-vilanterol (BREO ELLIPTA) 100-25 MCG/INH oral inhaler Inhale 1 puff into the lungs  daily       Furosemide (LASIX PO) Take 40 mg by mouth daily And 20mg q afternoon.       GABAPENTIN PO Take 100 mg by mouth every morning       GABAPENTIN PO Take 200 mg by mouth At Bedtime        guaiFENesin (ROBITUSSIN) 100 MG/5ML SYRP Take 10 mLs by mouth every 4 hours as needed for cough        HYDROcodone-acetaminophen (NORCO) 5-325 MG tablet Take 1 tablet by mouth 3 times daily       HYDROcodone-acetaminophen (NORCO) 5-325 MG tablet Take 1-2 tablets by mouth every 4 hours as needed for severe pain        hydrocortisone 1 % CREA cream Apply topically every 6 hours as needed for itching       isosorbide mononitrate (IMDUR) 30 MG 24 hr tablet TAKE 1 TABLET BY MOUTH ONCE DAILY 31 tablet 98     isradipine (DYNACIRC) 5 MG capsule TAKE 1 CAPSULE BY MOUTH TWICE DAILY 62 capsule 98     levalbuterol (XOPENEX) 1.25 MG/3ML neb solution Take 1 ampule by nebulization every 4 hours as needed for shortness of breath / dyspnea or wheezing       lisinopril (PRINIVIL/ZESTRIL) 5 MG tablet Take 5 mg by mouth daily       loperamide (IMODIUM) 2 MG capsule Take 2 mg by mouth 4 times daily as needed for diarrhea       magnesium hydroxide (MILK OF MAGNESIA) 400 MG/5ML suspension Take 30 mLs by mouth daily as needed for constipation or heartburn       MAGNESIUM OXIDE PO Take 250 mg by mouth daily       memantine (NAMENDA) 5 MG tablet TAKE 1 TABLET BY MOUTH ONCE DAILY 31 tablet 98     Menthol, Topical Analgesic, (BIOFREEZE) 4 % GEL Externally apply topically daily And Three times a day PRN       methylPREDNISolone (MEDROL DOSEPAK) 4 MG tablet Take 4 mg by mouth daily follow package directions       nystatin (MYCOSTATIN) 261649 UNIT/GM POWD Apply topically 2 times daily as needed        OMEPRAZOLE PO Take 40 mg by mouth every morning       Ondansetron (ZOFRAN ODT PO) Take 4 mg by mouth every 8 hours as needed for nausea       polyethylene glycol (MIRALAX/GLYCOLAX) Packet Take 17 g by mouth daily as needed        polyvinyl alcohol  "(LIQUIFILM TEARS) 1.4 % ophthalmic solution Place 1 drop into both eyes 2 times daily And 4 times a day PRN       senna-docusate (SENOKOT-S/PERICOLACE) 8.6-50 MG tablet Take 1 tablet by mouth 2 times daily And 1 tab every day PRN       SIMETHICONE-80 PO Take 160 mg by mouth 4 times daily as needed for intestinal gas          ROS:  10 point ROS of systems including Constitutional, Eyes, Respiratory, Cardiovascular, Gastroenterology, Genitourinary, Integumentary, Musculoskeletal, Psychiatric were all negative except for pertinent positives noted in my HPI.    Exam:  /76   Pulse 71   Temp 97.8  F (36.6  C)   Resp 20   Ht 1.6 m (5' 3\")   Wt 91.2 kg (201 lb)   BMI 35.61 kg/m     GENERAL APPEARANCE:  Alert, in no distress, oriented, morbidly obese, cooperative  RESP:  respiratory effort and palpation of chest normal, lungs clear to auscultation , no respiratory distress, diminished breath sounds bilat bases, on RA  CV:  Palpation and auscultation of heart done , regular rate and rhythm, no murmur, rub, or gallop, +2 pedal pulses, peripheral edema 1-2+ in BLE  ABDOMEN:  normal bowel sounds, soft, nontender, no hepatosplenomegaly or other masses, no guarding or rebound  M/S:   PETERSEN purposefully, generalized weakness, decreased ROM to lumbar spine  SKIN:  Inspection of skin and subcutaneous tissue baseline, wound no visualized as patient unable to reposition d/t pain  NEURO:   Cranial nerves 2-12 are normal tested and grossly at patient's baseline, Examination of sensation by touch normal  PSYCH:  oriented X 3, affect and mood normal, memory fair    Lab/Diagnostic data:    CBC RESULTS:   Recent Labs   Lab Test 01/03/19  0941 11/23/18  1042 11/14/18  0802   WBC  --  9.1 7.6   RBC  --  4.05 3.70*   HGB 10.8* 11.1* 9.8*   HCT  --  36.8 32.9*   MCV  --  91 89   MCH  --  27.4 26.5   MCHC  --  30.2* 29.8*   RDW  --  17.0* 16.8*   PLT  --  202 193       Last Basic Metabolic Panel:  Recent Labs   Lab Test 01/14/19  0651 " 01/03/19  0941    141   POTASSIUM 4.7 3.3*   CHLORIDE 108 104   BLAIR 8.9 9.2   CO2 30 31   BUN 24 29   CR 1.03 1.08*   GLC 87 157*       Liver Function Studies -   Recent Labs   Lab Test 06/25/18  1020   ALBUMIN 3.5       TSH   Date Value Ref Range Status   08/14/2017 0.62 0.40 - 4.00 mU/L Final   04/18/2007 0.60 0.4 - 5.0 mU/L Final       Lab Results   Component Value Date    A1C 6.2 08/31/2017       ASSESSMENT/PLAN:  (M48.061) Spinal stenosis of lumbar region, unspecified whether neurogenic claudication present  (primary encounter diagnosis)  (Z98.890) S/P lumbar laminectomy  (G89.18) Acute post-operative pain  Physical deconditioning  Comment: pain uncontrolled, no s/s of infection, neurologically stable  Plan: Resume PRA norco TID, will increased PRN dose to 1-2 tabs q4 h PRN (she is not narcotic naive), but max 2 tabs in 4 hours, gabapentin 100mg qAM, 200mg at bedtime, APAP 650mg q4h pRN - mas 4g in 24 hours,  Continue methylrednisolone taper, biofreeze BID and PRN, PT/OT to eval and treat, f/u with neurosurgery in 6 weeks, staff to clarify if staples can be removed in TCU in 2 weeks to avoid extra visit to clinic for removal    (I50.9) Congestive heart failure, unspecified HF chronicity, unspecified heart failure type (H)  Comment: no s/s of exacerbation, lasix held during hospital stay. Of note weight is up 10lbs since discharge from TCU 12/2018  Plan: Continue lasix 40mg q Am and 20mg q afternoon, BP elevated in TCU and creatinine/K+ stable at hospital discharge so will restart lisinopril at 5mg (discontinued during previous [facility] stay d/t hyperkalemia), imdur 30mg daily, ASA 81mg daily, daily weights, heart healthy diet.     (N18.3) CKD (chronic kidney disease) stage 3, GFR 30-59 ml/min (H)  Comment: elevated as high as 2.44, improved to 1.17 prior to discharge ; baseline 1.1-1.3.   Plan: Avoid nephrotoxic medications, BMP to check for stability.     (J44.9) Chronic obstructive pulmonary disease,  unspecified COPD type (H)  Comment: appears stable, d/t error on order from previous [facility] orders and patient request not resume Anoro Ellipta stopped in December.   Plan: Encourage use of IS, Breo Ellipta 1 puff daily, levalbuterol nebs PRN, monitor    (I10) Benign essential hypertension  Comment: elevated in TCU, suspect pain contributing  Plan: Restart lisinopril 5mg daily, isradipine 5mg daily, imdur 30mg daily lasix 40mg q AM, 20mg q afternoon, monitor and adjusts    (K59.01) Slow transit constipation  Comment: having BM's  Plan: miralax daily as needed, senna-s 1 tab BID and 1 tab daily PRN.     (G30.9,  F02.80) Alzheimer's dementia without behavioral disturbance, unspecified timing of dementia onset  Comment: lives in [facility], poor historian at baseline  Plan: continue memantine 5mg daily, donepezil 5mg daily, monitor    (F32.0) Mild major depression (H)  Comment: somewhat distressed today, likely d/t pain; reports her mood has been stable  Plan: continue duloxetine 30mg daily    (K21.9) Gastroesophageal reflux disease, esophagitis presence not specified  Comment: asymptomatic currently,   Plan: omeprazole 40mg daily,     (Z86.73) H/O: CVA (cerebrovascular accident)  Comment: neurologically at baseline  Plan: ASA, statin,     (E78.5) Hyperlipidemia, unspecified hyperlipidemia type  Comment: stable  Plan: continue statin,     (D62) Anemia due to blood loss, acute  Comment: acute on chronic, baseline in 10's range. Suspect CKD contributing; 9.2 post-operatively. No s/s of bleeding currently   Plan: CBC to check for stability, monitor for s/s of bleeding    Orders:  1. Change Norco 5/325 mg to 1 tab PO TID an 1-2 tab PO Q 4 hours PRN   2. Lisinopril 5 mg PO daily Dx: CHF, HTN  3. BMP on 1/21 Dx: CKD Hgb on 1.21 Dx: Anemia   4. Encourage use of IS while awake   5. Ok to take norco and isradipine -not allergic has tolerated without issue     Total time spent with patient visit at the skilled nursing facility was 40  including patient visit and review of past records. Greater than 50% of total time spent with counseling and coordinating care due to pain mangement, plan of of care in TCU, patient education d/t CHF, COPD, uncontrolled pain s/p laminectomy    Electronically signed by:  MARY Gómez CNP        Sincerely,        MARY Gómez CNP

## 2019-01-15 ENCOUNTER — NURSING HOME VISIT (OUTPATIENT)
Dept: GERIATRICS | Facility: CLINIC | Age: 84
End: 2019-01-15
Payer: COMMERCIAL

## 2019-01-15 ENCOUNTER — HOSPITAL LABORATORY (OUTPATIENT)
Facility: OTHER | Age: 84
End: 2019-01-15

## 2019-01-15 VITALS
WEIGHT: 201 LBS | TEMPERATURE: 97.9 F | HEART RATE: 64 BPM | RESPIRATION RATE: 20 BRPM | DIASTOLIC BLOOD PRESSURE: 82 MMHG | HEIGHT: 63 IN | SYSTOLIC BLOOD PRESSURE: 189 MMHG | BODY MASS INDEX: 35.61 KG/M2

## 2019-01-15 DIAGNOSIS — I25.10 CORONARY ARTERY DISEASE INVOLVING NATIVE CORONARY ARTERY OF NATIVE HEART WITHOUT ANGINA PECTORIS: ICD-10-CM

## 2019-01-15 DIAGNOSIS — K59.01 SLOW TRANSIT CONSTIPATION: ICD-10-CM

## 2019-01-15 DIAGNOSIS — M81.0 OSTEOPOROSIS WITHOUT CURRENT PATHOLOGICAL FRACTURE, UNSPECIFIED OSTEOPOROSIS TYPE: ICD-10-CM

## 2019-01-15 DIAGNOSIS — M48.062 SPINAL STENOSIS OF LUMBAR REGION WITH NEUROGENIC CLAUDICATION: ICD-10-CM

## 2019-01-15 DIAGNOSIS — E78.5 HYPERLIPIDEMIA, UNSPECIFIED HYPERLIPIDEMIA TYPE: ICD-10-CM

## 2019-01-15 DIAGNOSIS — F03.90 DEMENTIA WITHOUT BEHAVIORAL DISTURBANCE, UNSPECIFIED DEMENTIA TYPE: ICD-10-CM

## 2019-01-15 DIAGNOSIS — R53.81 PHYSICAL DECONDITIONING: ICD-10-CM

## 2019-01-15 DIAGNOSIS — Z98.890 S/P LUMBAR LAMINECTOMY: ICD-10-CM

## 2019-01-15 DIAGNOSIS — K21.9 GASTROESOPHAGEAL REFLUX DISEASE WITHOUT ESOPHAGITIS: ICD-10-CM

## 2019-01-15 DIAGNOSIS — J44.9 CHRONIC OBSTRUCTIVE PULMONARY DISEASE, UNSPECIFIED COPD TYPE (H): ICD-10-CM

## 2019-01-15 DIAGNOSIS — F41.8 DEPRESSION WITH ANXIETY: ICD-10-CM

## 2019-01-15 DIAGNOSIS — Z86.73 HISTORY OF CVA (CEREBROVASCULAR ACCIDENT): ICD-10-CM

## 2019-01-15 DIAGNOSIS — R60.0 BILATERAL LEG EDEMA: ICD-10-CM

## 2019-01-15 DIAGNOSIS — J20.9 ACUTE BRONCHITIS, UNSPECIFIED ORGANISM: ICD-10-CM

## 2019-01-15 DIAGNOSIS — Z47.89 AFTERCARE FOLLOWING SURGERY OF THE MUSCULOSKELETAL SYSTEM: Primary | ICD-10-CM

## 2019-01-15 LAB
FLUAV+FLUBV AG SPEC QL: NEGATIVE
FLUAV+FLUBV AG SPEC QL: NEGATIVE
SPECIMEN SOURCE: NORMAL

## 2019-01-15 PROCEDURE — 99306 1ST NF CARE HIGH MDM 50: CPT | Performed by: INTERNAL MEDICINE

## 2019-01-15 RX ORDER — FUROSEMIDE 40 MG
40 TABLET ORAL DAILY
COMMUNITY
End: 2019-01-25

## 2019-01-15 RX ORDER — CYCLOBENZAPRINE HCL 5 MG
5 TABLET ORAL EVERY 8 HOURS PRN
COMMUNITY
End: 2019-02-18

## 2019-01-15 RX ORDER — PREDNISONE 10 MG/1
40 TABLET ORAL DAILY
COMMUNITY
Start: 2019-01-15 | End: 2019-01-29

## 2019-01-15 ASSESSMENT — MIFFLIN-ST. JEOR: SCORE: 1320.86

## 2019-01-15 NOTE — PROGRESS NOTES
[facility]  INITIAL VISIT NOTE  January 15, 2019    PRIMARY CARE PROVIDER AND CLINIC:  Junie Estrella [address] [address] / [facility] [id number]    Chief Complaint   Patient presents with     Hospital F/U       HPI:    Jazmin Clifton is a 86 year old  (12/30/1932) female who was seen at [facility] on January 15, 2019 for an initial visit. Medical history is notable for CAD, HTN, COPD, CVA and lumbar spinal stenosis. She was hospitalized at [facility] from 1/9/19 to 1/12/19 where she is s/p L1 L2 bilateral laminectomy and medial facetectomy. Surgery without complication. Developed acute renal failure during hospitalization which resolved with IV fluids and holding furosemide. She was admitted to this facility for medical management and rehab.     Today, Ms. Clifton is seen in her room. She developed a cough productive of clear sputum last night before bed. Endorses some body aches but says they are from the surgery and have not changed or gotten worse. No chest pain or dyspnea. She feels cold, but not chilled. Tells me it feels like when she gets bronchitis. With regard to her back, the pain is controlled. Moving her bowels. Getting some sleep. Working with therapies. Respiratory concerns as above per nursing update.     CODE STATUS:   CPR/Full code     ALLERGIES:     Allergies   Allergen Reactions     Accupril      Ace Inhibitors Unknown     Accupril     Augmentin Unknown     Levofloxacin Unknown, Muscle Pain (Myalgia) and Other (See Comments)     Comment: myalgias, Description:   Pt prescribed ciprofloxacin in Jan 2018 (no rxn documented)  Myalgias       Morphine Other (See Comments)     hallucinations     Nitrofurantoin Nausea and Vomiting and Unknown     Norvasc [Amlodipine Besylate]      Leg swelling       Quinapril Other (See Comments) and Unknown     Hypertension. 3.29.18 - Takes and tolerates lisinopril  Patient reports HTN with as reaction to this medication    "      PAST MEDICAL HISTORY:   Past Medical History:   Diagnosis Date     ABDOMINAL PAIN GENERALIZED 3/15/2006    1 month of abdominal pain that llqwhich radiates into her back and chest.  Pt was hospitilzed 2x for the pain at [facility] --pt hopsitized for 3 days.  Rcords not ab=vailable.  She was told either \" twisted intestine or blockage of bowel.  Pt feels it is the hiatal hernia.  Pt hospitilized again a second time  A month ago.  Ct scans x 2 showed \" nothing.\"  Pt had a barium and xrays.  Pt sa     Abdominal pain, generalized 3/15/2006    1 month of abdominal pain that llqwhich radiates into her back and chest.  Pt was hospitilzed 2x for the pain at [facility] --pt hopsitized for 3 days.  Rcords not ab=vailable.  She was told either \" twisted intestine or blockage of bowel.  Pt feels it is the hiatal hernia.  Pt hospitilized again a second time  A month ago.  Ct scans x 2 showed \" nothing.\"  Pt had a barium and xrays.  Pt sa     Atherosclerosis of renal artery (H)     Left renal artery stenosis     BENIGN HYPERTENSION 5/1/2006 5/1/2006   Increase catapres to 0.3 mg and decrease clonidine to 0.1 mg daily.  Recheck bp in 1 month.      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     Dyspepsia     Generalized osteoarthrosis, unspecified site     DJD-chronic back pain     Herpes zoster dermatitis of eyelid      Insomnia, unspecified      Lumbago     Chronic back pain     Other chest pain     Atypical Chest Pain     Other specified cardiac dysrhythmias(427.89)     Bradycardia, improved on lower dose beta blockers      Rectocele     Grade 3     Unspecified disorder of kidney and ureter     Renal insufficiency "     Unspecified essential hypertension        PAST SURGICAL HISTORY:   Past Surgical History:   Procedure Laterality Date     CHOLECYSTECTOMY, LAPOROSCOPIC  1997    Cholecystectomy, Laparoscopic     ENDARTERECTOMY CAROTID Right 8/31/2017    Procedure: ENDARTERECTOMY CAROTID;  RIGHT CAROTID ENDARTERECTOMY WITH EEG;  Surgeon: Hilario Parry MD;  Location: [facility]     HYSTERECTOMY, RAYMOND  1982    oophorectomy,RAYMOND     SURGICAL HISTORY OF -       Laminectomy x 3     SURGICAL HISTORY OF -       MCA Aneurysm repair     SURGICAL HISTORY OF -   1996    Bladder suspension (Bladder Repair x2)     SURGICAL HISTORY OF -       Bilateral Hand Surgeries for Arthritis x2     SURGICAL HISTORY OF -       Repair of a Cerebral Aneurysm       FAMILY HISTORY:   Family History   Problem Relation Age of Onset     Cancer Mother         stomache     Cerebrovascular Disease Father      Heart Disease Father      Cancer Brother         lymphoma     Eye Disorder Son      Asthma Son      Diabetes Son      Gastrointestinal Disease Son         no control over bladder or bowels     C.A.D. No family hx of      Hypertension No family hx of      Breast Cancer No family hx of      Cancer - colorectal No family hx of      Prostate Cancer No family hx of        SOCIAL HISTORY:   Lives in AL facility     MEDICATIONS:  Current Outpatient Medications   Medication Sig Dispense Refill     acetaminophen (TYLENOL) 325 MG tablet Take 650 mg by mouth every 4 hours as needed for mild pain       Alendronate Sodium (FOSAMAX PO) Take 10 mg by mouth every morning (before breakfast)        Ascorbic Acid (VITAMIN C PO) Take 500 mg by mouth daily       aspirin 81 MG chewable tablet Take 81 mg by mouth       atorvastatin (LIPITOR) 40 MG tablet TAKE 1 TABLET BY MOUTH ONCE DAILY 31 tablet 98     CALCIUM 600+D 600-200 MG-UNIT TABS TAKE 1 TAB BY MOUTH ONCE DAILY 30 tablet 11     calcium carbonate (TUMS) 500 MG chewable tablet Take 1 chew tab by mouth every hour as needed for  heartburn       cyclobenzaprine (FLEXERIL) 5 MG tablet Take 5 mg by mouth every 8 hours as needed for muscle spasms       DONEPEZIL HCL PO Take 5 mg by mouth daily       DULoxetine HCl (CYMBALTA PO) Take 30 mg by mouth daily       fluticasone (FLONASE) 50 MCG/ACT spray Spray 1 spray into both nostrils 2 times daily as needed        fluticasone-vilanterol (BREO ELLIPTA) 100-25 MCG/INH oral inhaler Inhale 1 puff into the lungs daily       Furosemide (LASIX PO) Take 40 mg by mouth daily And 20mg q afternoon.       furosemide (LASIX) 40 MG tablet Take 40 mg by mouth daily       GABAPENTIN PO Take 100 mg by mouth every morning       GABAPENTIN PO Take 200 mg by mouth At Bedtime        guaiFENesin (ROBITUSSIN) 100 MG/5ML SYRP Take 10 mLs by mouth every 4 hours as needed for cough        HYDROcodone-acetaminophen (NORCO) 5-325 MG tablet Take 1 tablet by mouth 3 times daily       HYDROcodone-acetaminophen (NORCO) 5-325 MG tablet Take 1-2 tablets by mouth every 4 hours as needed for severe pain        hydrocortisone 1 % CREA cream Apply topically every 6 hours as needed for itching       isosorbide mononitrate (IMDUR) 30 MG 24 hr tablet TAKE 1 TABLET BY MOUTH ONCE DAILY 31 tablet 98     isradipine (DYNACIRC) 5 MG capsule TAKE 1 CAPSULE BY MOUTH TWICE DAILY 62 capsule 98     levalbuterol (XOPENEX) 1.25 MG/3ML neb solution Take 1 ampule by nebulization every 4 hours as needed for shortness of breath / dyspnea or wheezing       lisinopril (PRINIVIL/ZESTRIL) 5 MG tablet Take 5 mg by mouth daily       loperamide (IMODIUM) 2 MG capsule Take 2 mg by mouth 4 times daily as needed for diarrhea       magnesium hydroxide (MILK OF MAGNESIA) 400 MG/5ML suspension Take 30 mLs by mouth daily as needed for constipation or heartburn       MAGNESIUM OXIDE PO Take 250 mg by mouth daily       memantine (NAMENDA) 5 MG tablet TAKE 1 TABLET BY MOUTH ONCE DAILY 31 tablet 98     Menthol, Topical Analgesic, (BIOFREEZE) 4 % GEL Externally apply  "topically daily And Three times a day PRN       nystatin (MYCOSTATIN) 055567 UNIT/GM POWD Apply topically 2 times daily as needed        OMEPRAZOLE PO Take 40 mg by mouth every morning       Ondansetron (ZOFRAN ODT PO) Take 4 mg by mouth every 8 hours as needed for nausea       polyethylene glycol (MIRALAX/GLYCOLAX) Packet Take 17 g by mouth daily as needed        polyvinyl alcohol (LIQUIFILM TEARS) 1.4 % ophthalmic solution Place 1 drop into both eyes 2 times daily And 4 times a day PRN       predniSONE (DELTASONE) 10 MG tablet Take 40 mg by mouth daily.       senna-docusate (SENOKOT-S/PERICOLACE) 8.6-50 MG tablet Take 1 tablet by mouth 2 times daily And 1 tab every day PRN       SIMETHICONE-80 PO Take 160 mg by mouth 4 times daily as needed for intestinal gas          Post Discharge Medication Reconciliation Status: medication reconcilation previously completed during another office visit.    ROS:  10 point ROS neg other than the symptoms noted above in the HPI.    PHYSICAL EXAM:  /82   Pulse 64   Temp 97.9  F (36.6  C)   Resp 20   Ht 1.6 m (5' 3\")   Wt 91.2 kg (201 lb)   BMI 35.61 kg/m    Gen: sitting in wheelchair, alert, cooperative and in no acute distress  HEENT: normocephalic; oropharynx clear  Card: RRR, S1, S2, no murmurs  Resp: lungs with scattered expiratory wheezes, moving good air, no crackles  GI: abdomen soft, not-tender  MSK: normal muscle tone, mild dependent bilateral LE edema  Neuro: CX II-XII grossly in tact; ROM in all four extremities grossly in tact  Psych: alert and oriented to self and general situation; normal affect    LABORATORY/IMAGING DATA:  Reviewed as per Epic    ASSESSMENT/PLAN:    COPD  Acute Bronchitis   Some expiratory wheezing on exam, but no raman exacerbation. Has a cough productive of clear sputum that started last night. Afebrile. No hypoxia.   -- start prednisone 40 mg daily x5 days  -- CXR  -- influenza swab   -- droplet precautions until flu returns negative "   -- no abx for now - if develops fever, hypoxia or clinically worsens then low threshold to start   -- continues on fluticasone-vilanterol 100-25 mcg daily, umeclidinium-vilanterol 62.5-25 mcg daily and levalbuterol nebs    ADDENDUM: CXR has returned without infiltrate and read was consistent with bronchitis.       Lumbar Spinal Stenosis s/p L1 L2 Bilateral Laminectomy and Medial Facetectomy (1/9/19)  Surgery without complication.   -- analgesia with APAP 650 mg q4h PRN, gabapentin 100 mg qam and 200 mg at bedtime as well as Norco 5-325 mg TID and 1-2 tabs q4h PRN  -- PT/OT  -- follow up with neurosurgery as scheduled      CAD / CHF / HTN / HLD  SBPs labile in 130s-170s with limited data   -- continues on ASA 81 mg daily, atorvastatin 40 mg qhs, furosemide 40 mg daily in the morning 20 mg in the afternoon, Imdur 30 mg daily, isradipine 5 mg BID and lisinopril 5 mg daily   -- follow BPs, weights, clinical volume status     Bilateral LE Edema  Chronic.   -- continues on ffurosemide 40 mg daily in the morning 20 mg in the afternoon  -- compression and elevation      Dementia Without Behavioral Disturbance  Lives in AL facility. Today was a good historian. CPT 4.7/5.6 during [facility] stay.   -- OT following for cog assessment  -- continues on donepezil 5 mg daily and memantine 5 mg daily      Depression / Anxiety  Mood and spirits good today  -- continues on duloxetine 30 mg daily  -- supportive cares     CVA  By history.   -- continues on ASA 81 mg daily and atorvastatin 40 mg qhs     Osteoporosis  -- continues on alendronate 10 mg daily and Ca+D     GERD  -- continues on omeprazole 40 mg daily     Slow Transit Constipation  -- continues on Miralax 17g daily PRN and Senna-S 1 tab BID and daily PRN  -- adjust bowel regimen as needed     Physical Deconditioning  In setting of hospitalization and underlying medical conditions  -- ongoing PT/OT    Electronically signed by:  Ana Cristina Yu MD

## 2019-01-16 ENCOUNTER — NURSING HOME VISIT (OUTPATIENT)
Dept: GERIATRICS | Facility: CLINIC | Age: 84
End: 2019-01-16
Payer: COMMERCIAL

## 2019-01-16 VITALS
HEART RATE: 92 BPM | TEMPERATURE: 98 F | RESPIRATION RATE: 18 BRPM | HEIGHT: 63 IN | WEIGHT: 200 LBS | DIASTOLIC BLOOD PRESSURE: 74 MMHG | BODY MASS INDEX: 35.44 KG/M2 | SYSTOLIC BLOOD PRESSURE: 160 MMHG

## 2019-01-16 DIAGNOSIS — Z98.890 S/P LUMBAR LAMINECTOMY: ICD-10-CM

## 2019-01-16 DIAGNOSIS — J20.9 ACUTE BRONCHITIS, UNSPECIFIED ORGANISM: Primary | ICD-10-CM

## 2019-01-16 DIAGNOSIS — M25.551 ACUTE RIGHT HIP PAIN: ICD-10-CM

## 2019-01-16 DIAGNOSIS — M48.062 SPINAL STENOSIS OF LUMBAR REGION WITH NEUROGENIC CLAUDICATION: ICD-10-CM

## 2019-01-16 DIAGNOSIS — J44.9 CHRONIC OBSTRUCTIVE PULMONARY DISEASE, UNSPECIFIED COPD TYPE (H): ICD-10-CM

## 2019-01-16 LAB
FLUAV H1 2009 PAND RNA SPEC QL NAA+PROBE: NEGATIVE
FLUAV H1 RNA SPEC QL NAA+PROBE: NEGATIVE
FLUAV H3 RNA SPEC QL NAA+PROBE: NEGATIVE
FLUAV RNA SPEC QL NAA+PROBE: NEGATIVE
FLUBV RNA SPEC QL NAA+PROBE: NEGATIVE
HADV DNA SPEC QL NAA+PROBE: NEGATIVE
HADV DNA SPEC QL NAA+PROBE: NEGATIVE
HMPV RNA SPEC QL NAA+PROBE: NEGATIVE
HPIV1 RNA SPEC QL NAA+PROBE: NEGATIVE
HPIV2 RNA SPEC QL NAA+PROBE: NEGATIVE
HPIV3 RNA SPEC QL NAA+PROBE: NEGATIVE
MICROBIOLOGIST REVIEW: NORMAL
RHINOVIRUS RNA SPEC QL NAA+PROBE: NEGATIVE
RSV RNA SPEC QL NAA+PROBE: NEGATIVE
RSV RNA SPEC QL NAA+PROBE: NEGATIVE
SPECIMEN SOURCE: NORMAL

## 2019-01-16 PROCEDURE — 99309 SBSQ NF CARE MODERATE MDM 30: CPT | Performed by: NURSE PRACTITIONER

## 2019-01-16 ASSESSMENT — MIFFLIN-ST. JEOR: SCORE: 1316.32

## 2019-01-16 NOTE — PROGRESS NOTES
[facility]    Chief Complaint   Patient presents with     halfway Acute       [place] Medical Record Number:  [id number]  Place of Service where encounter took place:  [facility] [place] ([facility]) [[id number]]    HPI:    Jazmin Clifton is a 86 year old  (12/30/1932), who is being seen today for an episodic care visit.  HPI information obtained from: facility chart records, facility staff, patient report and [place] Epic chart review.    Today's concern is:     Acute bronchitis, unspecified organism  Chronic obstructive pulmonary disease, unspecified COPD type (H)  Spinal stenosis of lumbar region with neurogenic claudication  S/P lumbar laminectomy  Acute right hip pain   Presented overnight on 1/15 with harsh cough, wheezing. Was afebrile, stable on RA. Was started on prednisone 40mg daily x5 days, levalbuterol nebs TID. Flu swan done and was negative. CXR done and showed no infiltrates and findings were consistent with bronchitis. She has remained afebrile overnight, no hypoxia. Today she reports her breathing feels much better, has been coughing less. Cough continues to produce thin sputum. Did have some SOB with ambulation. Vandana any chills. Continues to have mild body aches, but has had these since the surgery. Does reports she did have some tremors in her arms this morning after her nebulizer and morning medication (including steroid). She spilt syrup on the chair and twisted to clean it up and developed severe pain in her right hip that did shoot down her leg. States pain was so severe she thought she broke her hip. States she never wants to have back surgery or have to go through this again.  Nurse massaged her right hip and she was given flexeril. She reports pain is nearly resolved, was able to walk with therapy this AM.    ALLERGIES: Accupril; Ace inhibitors; Augmentin; Levofloxacin; Morphine; Nitrofurantoin; Norvasc [amlodipine besylate]; and Quinapril  Past Medical,  Surgical, Family and Social History reviewed and updated in [facility].    Current Outpatient Medications   Medication Sig Dispense Refill     acetaminophen (TYLENOL) 325 MG tablet Take 650 mg by mouth every 4 hours as needed for mild pain       Alendronate Sodium (FOSAMAX PO) Take 10 mg by mouth every morning (before breakfast)        Ascorbic Acid (VITAMIN C PO) Take 500 mg by mouth daily       aspirin 81 MG chewable tablet Take 81 mg by mouth       atorvastatin (LIPITOR) 40 MG tablet TAKE 1 TABLET BY MOUTH ONCE DAILY 31 tablet 98     CALCIUM 600+D 600-200 MG-UNIT TABS TAKE 1 TAB BY MOUTH ONCE DAILY 30 tablet 11     calcium carbonate (TUMS) 500 MG chewable tablet Take 1 chew tab by mouth every hour as needed for heartburn       cyclobenzaprine (FLEXERIL) 5 MG tablet Take 5 mg by mouth every 8 hours as needed for muscle spasms       DONEPEZIL HCL PO Take 5 mg by mouth daily       DULoxetine HCl (CYMBALTA PO) Take 30 mg by mouth daily       fluticasone (FLONASE) 50 MCG/ACT spray Spray 1 spray into both nostrils 2 times daily as needed        fluticasone-vilanterol (BREO ELLIPTA) 100-25 MCG/INH oral inhaler Inhale 1 puff into the lungs daily       Furosemide (LASIX PO) Take 40 mg by mouth daily And 20mg q afternoon.       furosemide (LASIX) 40 MG tablet Take 40 mg by mouth daily       GABAPENTIN PO Take 100 mg by mouth every morning       GABAPENTIN PO Take 200 mg by mouth At Bedtime        guaiFENesin (ROBITUSSIN) 100 MG/5ML SYRP Take 10 mLs by mouth every 4 hours as needed for cough        HYDROcodone-acetaminophen (NORCO) 5-325 MG tablet Take 1 tablet by mouth 3 times daily       HYDROcodone-acetaminophen (NORCO) 5-325 MG tablet Take 1-2 tablets by mouth every 4 hours as needed for severe pain        hydrocortisone 1 % CREA cream Apply topically every 6 hours as needed for itching       isosorbide mononitrate (IMDUR) 30 MG 24 hr tablet TAKE 1 TABLET BY MOUTH ONCE DAILY 31 tablet 98     isradipine (DYNACIRC) 5 MG capsule  TAKE 1 CAPSULE BY MOUTH TWICE DAILY 62 capsule 98     levalbuterol (XOPENEX) 1.25 MG/3ML neb solution Take 1 ampule by nebulization 3 times daily And every 4 hours PRN       lisinopril (PRINIVIL/ZESTRIL) 5 MG tablet Take 5 mg by mouth daily       loperamide (IMODIUM) 2 MG capsule Take 2 mg by mouth 4 times daily as needed for diarrhea       magnesium hydroxide (MILK OF MAGNESIA) 400 MG/5ML suspension Take 30 mLs by mouth daily as needed for constipation or heartburn       MAGNESIUM OXIDE PO Take 250 mg by mouth daily       memantine (NAMENDA) 5 MG tablet TAKE 1 TABLET BY MOUTH ONCE DAILY 31 tablet 98     Menthol, Topical Analgesic, (BIOFREEZE) 4 % GEL Externally apply topically daily And Three times a day PRN       nystatin (MYCOSTATIN) 319661 UNIT/GM POWD Apply topically 2 times daily as needed        OMEPRAZOLE PO Take 40 mg by mouth every morning       Ondansetron (ZOFRAN ODT PO) Take 4 mg by mouth every 8 hours as needed for nausea       polyethylene glycol (MIRALAX/GLYCOLAX) Packet Take 17 g by mouth daily as needed        polyvinyl alcohol (LIQUIFILM TEARS) 1.4 % ophthalmic solution Place 1 drop into both eyes 2 times daily And 4 times a day PRN       predniSONE (DELTASONE) 10 MG tablet Take 40 mg by mouth daily Then decrease to 30 mg PO x2 days then 20 mg PO x2 days then 10 mg PO x2 days then discontinue.       senna-docusate (SENOKOT-S/PERICOLACE) 8.6-50 MG tablet Take 1 tablet by mouth 2 times daily And 1 tab every day PRN       SIMETHICONE-80 PO Take 160 mg by mouth 4 times daily as needed for intestinal gas        Medications reviewed:  Medications reconciled to facility chart and changes were made to reflect current medications as identified as above med list. Below are the changes that were made:   Medications stopped since last EPIC medication reconciliation:   There are no discontinued medications.    Medications started since last [facility] medication reconciliation:  No orders of the defined types were  "placed in this encounter.        REVIEW OF SYSTEMS:  4 point ROS including Respiratory, CV, GI and , other than that noted in the HPI,  is negative    Physical Exam:  /74   Pulse 92   Temp 98  F (36.7  C)   Resp 18   Ht 1.6 m (5' 3\")   Wt 90.7 kg (200 lb)   BMI 35.43 kg/m    GENERAL APPEARANCE:  Alert, in no distress, oriented, morbidly obese, cooperative  RESP:  respiratory effort and palpation of chest normal, crackles scattered t/o, on RA, occasional strong, barking cough, produces clear thin sputum, expiratory wheezes  CV:  Palpation and auscultation of heart done , regular rate and rhythm, no murmur, rub, or gallop, +2 pedal pulses, peripheral edema 2+ in BLE  ABDOMEN:  normal bowel sounds, soft, nontender, no hepatosplenomegaly or other masses, no guarding or rebound  M/S:   generazlied weakness, no gross deformities, PETERSEN  SKIN:  wound healing well, no signs of infection lumbar spine incision C/D/I  NEURO:   Cranial nerves 2-12 are normal tested and grossly at patient's baseline, Examination of sensation by touch normal  PSYCH:  oriented X 3, normal insight, judgement and memory, affect and mood normal, anxious at times    Recent Labs:     CBC RESULTS:   Recent Labs   Lab Test 01/03/19  0941 11/23/18  1042 11/14/18  0802   WBC  --  9.1 7.6   RBC  --  4.05 3.70*   HGB 10.8* 11.1* 9.8*   HCT  --  36.8 32.9*   MCV  --  91 89   MCH  --  27.4 26.5   MCHC  --  30.2* 29.8*   RDW  --  17.0* 16.8*   PLT  --  202 193       Last Basic Metabolic Panel:  Recent Labs   Lab Test 01/14/19  0651 01/03/19  0941    141   POTASSIUM 4.7 3.3*   CHLORIDE 108 104   BLAIR 8.9 9.2   CO2 30 31   BUN 24 29   CR 1.03 1.08*   GLC 87 157*       Liver Function Studies -   Recent Labs   Lab Test 06/25/18  1020   ALBUMIN 3.5       TSH   Date Value Ref Range Status   08/14/2017 0.62 0.40 - 4.00 mU/L Final   04/18/2007 0.60 0.4 - 5.0 mU/L Final       Lab Results   Component Value Date    A1C 6.2 08/31/2017 "         Assessment/Plan:  (J20.9) Acute bronchitis, unspecified organism  (primary encounter diagnosis)  (J44.9) Chronic obstructive pulmonary disease, unspecified COPD type (H)  Comment: suspect viral as she remained afebrile and no hypoxia, does not appear to be COPD exacerbation, symptoms improved today  Plan: Continue prednisone, add taper as below, continue xopenex nebs TID, cough syrup PRN per ADALBERTO, fluticasone-vilanterol 100-25 mcg daily and umeclidinium-vilanterol 62.5-25 mcg daily; if becomes febrile or hypoxic consider addition of antibiotic.     (M48.062) Spinal stenosis of lumbar region with neurogenic claudication  (Z98.890) S/P lumbar laminectomy  (M25.551) Acute right hip pain  Comment: Suspect muscle spasms caused by twisting movement; sensation to BLE remains intact, ambulate to ambulate at baseline so low suspicion for spinal injury  Plan: PT/PT, continue norco TID and 1-2 tab q4h PRN, flexeril PRN TID, APAP PRN, biofreeze rub, gabapentin 100mg q AM and 200mg at bedtime, continue to monitor and adjust. F/u with neurosurgery as scheduled.       Orders:  1. After 5 days of prednisone 40 mg start prednisone 30 mg PO x2 days then 20 mg PO x2 days then 10 mg PO x2 days then discontinue Dx: Bronchitis  2. Ok to discontinue droplet precautions Dx: Flu negative      Electronically signed by  MARY Gómez CNP

## 2019-01-16 NOTE — LETTER
1/16/2019        RE: Jazmin Rich Howes  [address]  [facility] [address]        [place] [facility]    Chief Complaint   Patient presents with     FPC Acute       [facility] Medical Record Number:  [id number]  Place of Service where encounter took place:  [facility] [place] [facility] ([facility]) [[id number]]    HPI:    Jazmin Clifton is a 86 year old  (12/30/1932), who is being seen today for an episodic care visit.  HPI information obtained from: facility chart records, facility staff, patient report and [facility] chart review.    Today's concern is:     Acute bronchitis, unspecified organism  Chronic obstructive pulmonary disease, unspecified COPD type (H)  Spinal stenosis of lumbar region with neurogenic claudication  S/P lumbar laminectomy  Acute right hip pain   Presented overnight on 1/15 with harsh cough, wheezing. Was afebrile, stable on RA. Was started on prednisone 40mg daily x5 days, levalbuterol nebs TID. Flu swan done and was negative. CXR done and showed no infiltrates and findings were consistent with bronchitis. She has remained afebrile overnight, no hypoxia. Today she reports her breathing feels much better, has been coughing less. Cough continues to produce thin sputum. Did have some SOB with ambulation. Vandana any chills. Continues to have mild body aches, but has had these since the surgery. Does reports she did have some tremors in her arms this morning after her nebulizer and morning medication (including steroid). She spilt syrup on the chair and twisted to clean it up and developed severe pain in her right hip that did shoot down her leg. States pain was so severe she thought she broke her hip. States she never wants to have back surgery or have to go through this again.  Nurse massaged her right hip and she was given flexeril. She reports pain is nearly resolved, was able to walk with therapy this AM.    ALLERGIES: Accupril; Ace inhibitors;  Augmentin; Levofloxacin; Morphine; Nitrofurantoin; Norvasc [amlodipine besylate]; and Quinapril  Past Medical, Surgical, Family and Social History reviewed and updated in EP[facility].    Current Outpatient Medications   Medication Sig Dispense Refill     acetaminophen (TYLENOL) 325 MG tablet Take 650 mg by mouth every 4 hours as needed for mild pain       Alendronate Sodium (FOSAMAX PO) Take 10 mg by mouth every morning (before breakfast)        Ascorbic Acid (VITAMIN C PO) Take 500 mg by mouth daily       aspirin 81 MG chewable tablet Take 81 mg by mouth       atorvastatin (LIPITOR) 40 MG tablet TAKE 1 TABLET BY MOUTH ONCE DAILY 31 tablet 98     CALCIUM 600+D 600-200 MG-UNIT TABS TAKE 1 TAB BY MOUTH ONCE DAILY 30 tablet 11     calcium carbonate (TUMS) 500 MG chewable tablet Take 1 chew tab by mouth every hour as needed for heartburn       cyclobenzaprine (FLEXERIL) 5 MG tablet Take 5 mg by mouth every 8 hours as needed for muscle spasms       DONEPEZIL HCL PO Take 5 mg by mouth daily       DULoxetine HCl (CYMBALTA PO) Take 30 mg by mouth daily       fluticasone (FLONASE) 50 MCG/ACT spray Spray 1 spray into both nostrils 2 times daily as needed        fluticasone-vilanterol (BREO ELLIPTA) 100-25 MCG/INH oral inhaler Inhale 1 puff into the lungs daily       Furosemide (LASIX PO) Take 40 mg by mouth daily And 20mg q afternoon.       furosemide (LASIX) 40 MG tablet Take 40 mg by mouth daily       GABAPENTIN PO Take 100 mg by mouth every morning       GABAPENTIN PO Take 200 mg by mouth At Bedtime        guaiFENesin (ROBITUSSIN) 100 MG/5ML SYRP Take 10 mLs by mouth every 4 hours as needed for cough        HYDROcodone-acetaminophen (NORCO) 5-325 MG tablet Take 1 tablet by mouth 3 times daily       HYDROcodone-acetaminophen (NORCO) 5-325 MG tablet Take 1-2 tablets by mouth every 4 hours as needed for severe pain        hydrocortisone 1 % CREA cream Apply topically every 6 hours as needed for itching       isosorbide mononitrate  (IMDUR) 30 MG 24 hr tablet TAKE 1 TABLET BY MOUTH ONCE DAILY 31 tablet 98     isradipine (DYNACIRC) 5 MG capsule TAKE 1 CAPSULE BY MOUTH TWICE DAILY 62 capsule 98     levalbuterol (XOPENEX) 1.25 MG/3ML neb solution Take 1 ampule by nebulization 3 times daily And every 4 hours PRN       lisinopril (PRINIVIL/ZESTRIL) 5 MG tablet Take 5 mg by mouth daily       loperamide (IMODIUM) 2 MG capsule Take 2 mg by mouth 4 times daily as needed for diarrhea       magnesium hydroxide (MILK OF MAGNESIA) 400 MG/5ML suspension Take 30 mLs by mouth daily as needed for constipation or heartburn       MAGNESIUM OXIDE PO Take 250 mg by mouth daily       memantine (NAMENDA) 5 MG tablet TAKE 1 TABLET BY MOUTH ONCE DAILY 31 tablet 98     Menthol, Topical Analgesic, (BIOFREEZE) 4 % GEL Externally apply topically daily And Three times a day PRN       nystatin (MYCOSTATIN) 060425 UNIT/GM POWD Apply topically 2 times daily as needed        OMEPRAZOLE PO Take 40 mg by mouth every morning       Ondansetron (ZOFRAN ODT PO) Take 4 mg by mouth every 8 hours as needed for nausea       polyethylene glycol (MIRALAX/GLYCOLAX) Packet Take 17 g by mouth daily as needed        polyvinyl alcohol (LIQUIFILM TEARS) 1.4 % ophthalmic solution Place 1 drop into both eyes 2 times daily And 4 times a day PRN       predniSONE (DELTASONE) 10 MG tablet Take 40 mg by mouth daily Then decrease to 30 mg PO x2 days then 20 mg PO x2 days then 10 mg PO x2 days then discontinue.       senna-docusate (SENOKOT-S/PERICOLACE) 8.6-50 MG tablet Take 1 tablet by mouth 2 times daily And 1 tab every day PRN       SIMETHICONE-80 PO Take 160 mg by mouth 4 times daily as needed for intestinal gas        Medications reviewed:  Medications reconciled to facility chart and changes were made to reflect current medications as identified as above med list. Below are the changes that were made:   Medications stopped since last EP[facility] medication reconciliation:   There are no discontinued  "medications.    Medications started since last [facility] medication reconciliation:  No orders of the defined types were placed in this encounter.        REVIEW OF SYSTEMS:  4 point ROS including Respiratory, CV, GI and , other than that noted in the HPI,  is negative    Physical Exam:  /74   Pulse 92   Temp 98  F (36.7  C)   Resp 18   Ht 1.6 m (5' 3\")   Wt 90.7 kg (200 lb)   BMI 35.43 kg/m     GENERAL APPEARANCE:  Alert, in no distress, oriented, morbidly obese, cooperative  RESP:  respiratory effort and palpation of chest normal, crackles scattered t/o, on RA, occasional strong, barking cough, produces clear thin sputum, expiratory wheezes  CV:  Palpation and auscultation of heart done , regular rate and rhythm, no murmur, rub, or gallop, +2 pedal pulses, peripheral edema 2+ in BLE  ABDOMEN:  normal bowel sounds, soft, nontender, no hepatosplenomegaly or other masses, no guarding or rebound  M/S:   generazlied weakness, no gross deformities, PETERSEN  SKIN:  wound healing well, no signs of infection lumbar spine incision C/D/I  NEURO:   Cranial nerves 2-12 are normal tested and grossly at patient's baseline, Examination of sensation by touch normal  PSYCH:  oriented X 3, normal insight, judgement and memory, affect and mood normal, anxious at times    Recent Labs:     CBC RESULTS:   Recent Labs   Lab Test 01/03/19  0941 11/23/18  1042 11/14/18  0802   WBC  --  9.1 7.6   RBC  --  4.05 3.70*   HGB 10.8* 11.1* 9.8*   HCT  --  36.8 32.9*   MCV  --  91 89   MCH  --  27.4 26.5   MCHC  --  30.2* 29.8*   RDW  --  17.0* 16.8*   PLT  --  202 193       Last Basic Metabolic Panel:  Recent Labs   Lab Test 01/14/19  0651 01/03/19  0941    141   POTASSIUM 4.7 3.3*   CHLORIDE 108 104   BLAIR 8.9 9.2   CO2 30 31   BUN 24 29   CR 1.03 1.08*   GLC 87 157*       Liver Function Studies -   Recent Labs   Lab Test 06/25/18  1020   ALBUMIN 3.5       TSH   Date Value Ref Range Status   08/14/2017 0.62 0.40 - 4.00 mU/L Final "   04/18/2007 0.60 0.4 - 5.0 mU/L Final       Lab Results   Component Value Date    A1C 6.2 08/31/2017         Assessment/Plan:  (J20.9) Acute bronchitis, unspecified organism  (primary encounter diagnosis)  (J44.9) Chronic obstructive pulmonary disease, unspecified COPD type (H)  Comment: suspect viral as she remained afebrile and no hypoxia, does not appear to be COPD exacerbation, symptoms improved today  Plan: Continue prednisone, add taper as below, continue xopenex nebs TID, cough syrup PRN per ADALBERTO, fluticasone-vilanterol 100-25 mcg daily and umeclidinium-vilanterol 62.5-25 mcg daily; if becomes febrile or hypoxic consider addition of antibiotic.     (M48.062) Spinal stenosis of lumbar region with neurogenic claudication  (Z98.890) S/P lumbar laminectomy  (M25.551) Acute right hip pain  Comment: Suspect muscle spasms caused by twisting movement; sensation to BLE remains intact, ambulate to ambulate at baseline so low suspicion for spinal injury  Plan: PT/PT, continue norco TID and 1-2 tab q4h PRN, flexeril PRN TID, APAP PRN, biofreeze rub, gabapentin 100mg q AM and 200mg at bedtime, continue to monitor and adjust. F/u with neurosurgery as scheduled.       Orders:  1. After 5 days of prednisone 40 mg start prednisone 30 mg PO x2 days then 20 mg PO x2 days then 10 mg PO x2 days then discontinue Dx: Bronchitis  2. Ok to discontinue droplet precautions Dx: Flu negative      Electronically signed by  MARY Gómez CNP      Sincerely,        MARY Gómez CNP

## 2019-01-21 ENCOUNTER — HOSPITAL LABORATORY (OUTPATIENT)
Facility: OTHER | Age: 84
End: 2019-01-21

## 2019-01-21 ENCOUNTER — NURSING HOME VISIT (OUTPATIENT)
Dept: GERIATRICS | Facility: CLINIC | Age: 84
End: 2019-01-21
Payer: COMMERCIAL

## 2019-01-21 VITALS
DIASTOLIC BLOOD PRESSURE: 76 MMHG | RESPIRATION RATE: 20 BRPM | OXYGEN SATURATION: 93 % | TEMPERATURE: 97.8 F | SYSTOLIC BLOOD PRESSURE: 153 MMHG | BODY MASS INDEX: 34.61 KG/M2 | WEIGHT: 195.4 LBS | HEART RATE: 72 BPM

## 2019-01-21 DIAGNOSIS — J44.9 CHRONIC OBSTRUCTIVE PULMONARY DISEASE, UNSPECIFIED COPD TYPE (H): ICD-10-CM

## 2019-01-21 DIAGNOSIS — Z98.890 S/P LUMBAR LAMINECTOMY: ICD-10-CM

## 2019-01-21 DIAGNOSIS — Z47.89 AFTERCARE FOLLOWING SURGERY OF THE MUSCULOSKELETAL SYSTEM: ICD-10-CM

## 2019-01-21 DIAGNOSIS — D62 ANEMIA DUE TO BLOOD LOSS, ACUTE: ICD-10-CM

## 2019-01-21 DIAGNOSIS — M48.062 SPINAL STENOSIS OF LUMBAR REGION WITH NEUROGENIC CLAUDICATION: ICD-10-CM

## 2019-01-21 DIAGNOSIS — I10 BENIGN ESSENTIAL HYPERTENSION: ICD-10-CM

## 2019-01-21 DIAGNOSIS — J20.9 ACUTE BRONCHITIS, UNSPECIFIED ORGANISM: Primary | ICD-10-CM

## 2019-01-21 LAB
ANION GAP SERPL CALCULATED.3IONS-SCNC: 4 MMOL/L (ref 3–14)
BUN SERPL-MCNC: 36 MG/DL (ref 7–30)
CALCIUM SERPL-MCNC: 9 MG/DL (ref 8.5–10.1)
CHLORIDE SERPL-SCNC: 103 MMOL/L (ref 94–109)
CO2 SERPL-SCNC: 33 MMOL/L (ref 20–32)
CREAT SERPL-MCNC: 1.18 MG/DL (ref 0.52–1.04)
GFR SERPL CREATININE-BSD FRML MDRD: 42 ML/MIN/{1.73_M2}
GLUCOSE SERPL-MCNC: 90 MG/DL (ref 70–99)
HGB BLD-MCNC: 9.6 G/DL (ref 11.7–15.7)
POTASSIUM SERPL-SCNC: 3.9 MMOL/L (ref 3.4–5.3)
SODIUM SERPL-SCNC: 140 MMOL/L (ref 133–144)

## 2019-01-21 PROCEDURE — 99309 SBSQ NF CARE MODERATE MDM 30: CPT | Performed by: NURSE PRACTITIONER

## 2019-01-21 RX ORDER — FERROUS SULFATE 325(65) MG
325 TABLET ORAL
COMMUNITY
End: 2019-02-25

## 2019-01-21 NOTE — LETTER
1/21/2019        RE: Jazmin Rich [place]  [address]  [facility] [address]        [place] [facility]    Chief Complaint   Patient presents with     MCFP Acute       [facility] Medical Record Number:  [id number]  Place of Service where encounter took place:  [facility] [place] [facility] ([facility]) [[id number]]    HPI:    Jazmin Clifton is a 86 year old  (12/30/1932), who is being seen today for an episodic care visit.  HPI information obtained from: facility chart records, facility staff, patient report and [facility] chart review.      Today's concern is:     Acute bronchitis, unspecified organism  Chronic obstructive pulmonary disease, unspecified COPD type (H)  Spinal stenosis of lumbar region with neurogenic claudication  S/P lumbar laminectomy  Aftercare following surgery of the musculoskeletal system  Benign essential hypertension  Anemia due to blood loss, acute   Reports her breathing feels much better today. Her cough is less often and not as harsh. She has not noted any further wheezing. She remains afebrile and on RA. States laughing is what sets it off the most. She reports she continues to have legs spasms at times, especially after twisting movements. Pain is not as severe as it was last week, responds well to medication. She has been sleeping well at night, having regular bowel movements. She has not noted any new bruising, melena or hematoma. Appetite is good, no nausea, indigestion or heart burn. Feels that therapy is going well.     Last 3 BPs: 153/76, 149/72, 182/69  Admission Weight: 201lbs  Current Weight: 193lbs  Blood Sugar Range:   N/A    ALLERGIES: Accupril; Ace inhibitors; Augmentin; Levofloxacin; Morphine; Nitrofurantoin; Norvasc [amlodipine besylate]; and Quinapril  Past Medical, Surgical, Family and Social History reviewed and updated in [facility].    Current Outpatient Medications   Medication Sig Dispense Refill     acetaminophen (TYLENOL) 325 MG  tablet Take 650 mg by mouth every 4 hours as needed for mild pain       Alendronate Sodium (FOSAMAX PO) Take 10 mg by mouth every morning (before breakfast)        Ascorbic Acid (VITAMIN C PO) Take 500 mg by mouth daily       aspirin 81 MG chewable tablet Take 81 mg by mouth       atorvastatin (LIPITOR) 40 MG tablet TAKE 1 TABLET BY MOUTH ONCE DAILY 31 tablet 98     CALCIUM 600+D 600-200 MG-UNIT TABS TAKE 1 TAB BY MOUTH ONCE DAILY 30 tablet 11     calcium carbonate (TUMS) 500 MG chewable tablet Take 1 chew tab by mouth every hour as needed for heartburn       cyclobenzaprine (FLEXERIL) 5 MG tablet Take 5 mg by mouth every 8 hours as needed for muscle spasms       DONEPEZIL HCL PO Take 5 mg by mouth daily       DULoxetine HCl (CYMBALTA PO) Take 30 mg by mouth daily       ferrous sulfate (FEROSUL) 325 (65 Fe) MG tablet Take 325 mg by mouth daily (with breakfast)       fluticasone (FLONASE) 50 MCG/ACT spray Spray 1 spray into both nostrils 2 times daily as needed        fluticasone-vilanterol (BREO ELLIPTA) 100-25 MCG/INH oral inhaler Inhale 1 puff into the lungs daily       Furosemide (LASIX PO) Take 40 mg by mouth daily And 20mg q afternoon.       furosemide (LASIX) 40 MG tablet Take 40 mg by mouth daily       GABAPENTIN PO Take 100 mg by mouth every morning       GABAPENTIN PO Take 200 mg by mouth At Bedtime        guaiFENesin (ROBITUSSIN) 100 MG/5ML SYRP Take 10 mLs by mouth every 4 hours as needed for cough        HYDROcodone-acetaminophen (NORCO) 5-325 MG tablet Take 1 tablet by mouth 3 times daily       HYDROcodone-acetaminophen (NORCO) 5-325 MG tablet Take 1-2 tablets by mouth every 4 hours as needed for severe pain        hydrocortisone 1 % CREA cream Apply topically every 6 hours as needed for itching       isosorbide mononitrate (IMDUR) 30 MG 24 hr tablet TAKE 1 TABLET BY MOUTH ONCE DAILY 31 tablet 98     isradipine (DYNACIRC) 5 MG capsule TAKE 1 CAPSULE BY MOUTH TWICE DAILY 62 capsule 98     levalbuterol  (XOPENEX) 1.25 MG/3ML neb solution Take 1 ampule by nebulization 3 times daily And every 4 hours PRN       lisinopril (PRINIVIL/ZESTRIL) 5 MG tablet Take 5 mg by mouth daily       loperamide (IMODIUM) 2 MG capsule Take 2 mg by mouth 4 times daily as needed for diarrhea       magnesium hydroxide (MILK OF MAGNESIA) 400 MG/5ML suspension Take 30 mLs by mouth daily as needed for constipation or heartburn       MAGNESIUM OXIDE PO Take 250 mg by mouth daily       memantine (NAMENDA) 5 MG tablet TAKE 1 TABLET BY MOUTH ONCE DAILY 31 tablet 98     Menthol, Topical Analgesic, (BIOFREEZE) 4 % GEL Externally apply topically daily And Three times a day PRN       nystatin (MYCOSTATIN) 029632 UNIT/GM POWD Apply topically 2 times daily as needed        OMEPRAZOLE PO Take 40 mg by mouth every morning       Ondansetron (ZOFRAN ODT PO) Take 4 mg by mouth every 8 hours as needed for nausea       polyethylene glycol (MIRALAX/GLYCOLAX) Packet Take 17 g by mouth daily as needed        polyvinyl alcohol (LIQUIFILM TEARS) 1.4 % ophthalmic solution Place 1 drop into both eyes 2 times daily And 4 times a day PRN       senna-docusate (SENOKOT-S/PERICOLACE) 8.6-50 MG tablet Take 1 tablet by mouth 2 times daily And 1 tab every day PRN       SIMETHICONE-80 PO Take 160 mg by mouth 4 times daily as needed for intestinal gas        Medications reviewed:  Medications reconciled to facility chart and changes were made to reflect current medications as identified as above med list. Below are the changes that were made:   Medications stopped since last EPIC medication reconciliation:   There are no discontinued medications.    Medications started since last [facility] medication reconciliation:  No orders of the defined types were placed in this encounter.        REVIEW OF SYSTEMS:  4 point ROS including Respiratory, CV, GI and , other than that noted in the HPI,  is negative    Physical Exam:  /76   Pulse 72   Temp 97.8  F (36.6  C)   Resp 20    Wt 88.6 kg (195 lb 6.4 oz)   SpO2 93%   BMI 34.61 kg/m     GENERAL APPEARANCE:  Alert, in no distress, oriented, morbidly obese, cooperative  RESP:  respiratory effort and palpation of chest normal, lungs clear to auscultation , no respiratory distress, cough occasonal, strong, productive  CV:  Palpation and auscultation of heart done , regular rate and rhythm, no murmur, rub, or gallop, +2 pedal pulses, peripheral edema 2+ in bilat feet, 1-2 bilat ankles  ABDOMEN:  normal bowel sounds, soft, nontender, no hepatosplenomegaly or other masses, no guarding or rebound  M/S:   decreased ROM to lumbar spine, small slow steps with gait, amulates with walker, no gross joint deformities  SKIN:  Inspection of skin and subcutaneous tissue baseline, wound healing well, no signs of infection dressing C/D/I, no drainage or erythema; palpable right groin seroma  NEURO:   Cranial nerves 2-12 are normal tested and grossly at patient's baseline, Examination of sensation by touch normal  PSYCH:  oriented X 3, normal insight, judgement and memory, affect and mood normal    Recent Labs:     CBC RESULTS:   Recent Labs   Lab Test 01/21/19  0749 01/03/19  0941 11/23/18  1042 11/14/18  0802   WBC  --   --  9.1 7.6   RBC  --   --  4.05 3.70*   HGB 9.6* 10.8* 11.1* 9.8*   HCT  --   --  36.8 32.9*   MCV  --   --  91 89   MCH  --   --  27.4 26.5   MCHC  --   --  30.2* 29.8*   RDW  --   --  17.0* 16.8*   PLT  --   --  202 193       Last Basic Metabolic Panel:  Recent Labs   Lab Test 01/21/19  0749 01/14/19  0651    142   POTASSIUM 3.9 4.7   CHLORIDE 103 108   BLAIR 9.0 8.9   CO2 33* 30   BUN 36* 24   CR 1.18* 1.03   GLC 90 87       Liver Function Studies -   Recent Labs   Lab Test 06/25/18  1020   ALBUMIN 3.5       TSH   Date Value Ref Range Status   08/14/2017 0.62 0.40 - 4.00 mU/L Final   04/18/2007 0.60 0.4 - 5.0 mU/L Final       Lab Results   Component Value Date    A1C 6.2 08/31/2017         Assessment/Plan:  (J20.9) Acute bronchitis,  unspecified organism  (primary encounter diagnosis)  (J44.9) Chronic obstructive pulmonary disease, unspecified COPD type (H)  Comment: Improving, no s/s of exacerbation  Plan: Continue steroid taper - will drop to 20mg prednisone x 2 days tomorrow, then 10mg  x2 days then discontinue. Continue continue xopenex nebs TID, cough syrup PRN per ADALBERTO, fluticasone-vilanterol 100-25 mcg daily and umeclidinium-vilanterol 62.5-25 mcg daily. Monitor and adjust.    (M48.062) Spinal stenosis of lumbar region with neurogenic claudication  (Z98.890) S/P lumbar laminectomy  (Z47.89) Aftercare following surgery of the musculoskeletal system  Comment: pain control improved, mobility improving, healing well, no s/s of infection   Plan: PT/OT, continue norco TID and 1-2 tab q4h PRN, flexeril PRN TID, APAP PRN, biofreeze rub, gabapentin 100mg q AM and 200mg at bedtime, continue to monitor and adjust. F/u with neurosurgery as scheduled.     (I10) Benign essential hypertension  Comment: elevated at times, but suspect d/t pain or anxiety, steroids   Plan: continue furosemide 40 mg daily in the morning 20 mg in the afternoon, Imdur 30 mg daily, isradipine 5 mg BID and lisinopril 5 mg daily; monitor and adjust    (D62) Anemia due to blood loss, acute  Comment: acute on chronic, reports she has previously been on an iron supplement, baseline ~10; she is 9.6 today (was 10.8 on 1/3).   Plan: Will add an iron supplement, Hgb in 1 week to check for stability.     Orders:  1. Ferrous sulfate 325 mg. 1 tab PO every day with breakfast   2. Hgb on 1/28/19 Dx: Anemia      Electronically signed by  MARY Gómez CNP      Sincerely,        MARY Gómez CNP

## 2019-01-21 NOTE — PROGRESS NOTES
[place] [facility]    Chief Complaint   Patient presents with     FDC Acute       [place] Medical Record Number:  [id number]  Place of Service where encounter took place:  [facility] [[id number]]    HPI:    Jazmin Clifton is a 86 year old  (12/30/1932), who is being seen today for an episodic care visit.  HPI information obtained from: facility chart records, facility staff, patient report and [facility] chart review.      Today's concern is:     Acute bronchitis, unspecified organism  Chronic obstructive pulmonary disease, unspecified COPD type (H)  Spinal stenosis of lumbar region with neurogenic claudication  S/P lumbar laminectomy  Aftercare following surgery of the musculoskeletal system  Benign essential hypertension  Anemia due to blood loss, acute   Reports her breathing feels much better today. Her cough is less often and not as harsh. She has not noted any further wheezing. She remains afebrile and on RA. States laughing is what sets it off the most. She reports she continues to have legs spasms at times, especially after twisting movements. Pain is not as severe as it was last week, responds well to medication. She has been sleeping well at night, having regular bowel movements. She has not noted any new bruising, melena or hematoma. Appetite is good, no nausea, indigestion or heart burn. Feels that therapy is going well.     Last 3 BPs: 153/76, 149/72, 182/69  Admission Weight: 201lbs  Current Weight: 193lbs  Blood Sugar Range:   N/A    ALLERGIES: Accupril; Ace inhibitors; Augmentin; Levofloxacin; Morphine; Nitrofurantoin; Norvasc [amlodipine besylate]; and Quinapril  Past Medical, Surgical, Family and Social History reviewed and updated in [facility].    Current Outpatient Medications   Medication Sig Dispense Refill     acetaminophen (TYLENOL) 325 MG tablet Take 650 mg by mouth every 4 hours as needed for mild pain       Alendronate Sodium (FOSAMAX PO) Take 10  mg by mouth every morning (before breakfast)        Ascorbic Acid (VITAMIN C PO) Take 500 mg by mouth daily       aspirin 81 MG chewable tablet Take 81 mg by mouth       atorvastatin (LIPITOR) 40 MG tablet TAKE 1 TABLET BY MOUTH ONCE DAILY 31 tablet 98     CALCIUM 600+D 600-200 MG-UNIT TABS TAKE 1 TAB BY MOUTH ONCE DAILY 30 tablet 11     calcium carbonate (TUMS) 500 MG chewable tablet Take 1 chew tab by mouth every hour as needed for heartburn       cyclobenzaprine (FLEXERIL) 5 MG tablet Take 5 mg by mouth every 8 hours as needed for muscle spasms       DONEPEZIL HCL PO Take 5 mg by mouth daily       DULoxetine HCl (CYMBALTA PO) Take 30 mg by mouth daily       ferrous sulfate (FEROSUL) 325 (65 Fe) MG tablet Take 325 mg by mouth daily (with breakfast)       fluticasone (FLONASE) 50 MCG/ACT spray Spray 1 spray into both nostrils 2 times daily as needed        fluticasone-vilanterol (BREO ELLIPTA) 100-25 MCG/INH oral inhaler Inhale 1 puff into the lungs daily       Furosemide (LASIX PO) Take 40 mg by mouth daily And 20mg q afternoon.       furosemide (LASIX) 40 MG tablet Take 40 mg by mouth daily       GABAPENTIN PO Take 100 mg by mouth every morning       GABAPENTIN PO Take 200 mg by mouth At Bedtime        guaiFENesin (ROBITUSSIN) 100 MG/5ML SYRP Take 10 mLs by mouth every 4 hours as needed for cough        HYDROcodone-acetaminophen (NORCO) 5-325 MG tablet Take 1 tablet by mouth 3 times daily       HYDROcodone-acetaminophen (NORCO) 5-325 MG tablet Take 1-2 tablets by mouth every 4 hours as needed for severe pain        hydrocortisone 1 % CREA cream Apply topically every 6 hours as needed for itching       isosorbide mononitrate (IMDUR) 30 MG 24 hr tablet TAKE 1 TABLET BY MOUTH ONCE DAILY 31 tablet 98     isradipine (DYNACIRC) 5 MG capsule TAKE 1 CAPSULE BY MOUTH TWICE DAILY 62 capsule 98     levalbuterol (XOPENEX) 1.25 MG/3ML neb solution Take 1 ampule by nebulization 3 times daily And every 4 hours PRN        lisinopril (PRINIVIL/ZESTRIL) 5 MG tablet Take 5 mg by mouth daily       loperamide (IMODIUM) 2 MG capsule Take 2 mg by mouth 4 times daily as needed for diarrhea       magnesium hydroxide (MILK OF MAGNESIA) 400 MG/5ML suspension Take 30 mLs by mouth daily as needed for constipation or heartburn       MAGNESIUM OXIDE PO Take 250 mg by mouth daily       memantine (NAMENDA) 5 MG tablet TAKE 1 TABLET BY MOUTH ONCE DAILY 31 tablet 98     Menthol, Topical Analgesic, (BIOFREEZE) 4 % GEL Externally apply topically daily And Three times a day PRN       nystatin (MYCOSTATIN) 270117 UNIT/GM POWD Apply topically 2 times daily as needed        OMEPRAZOLE PO Take 40 mg by mouth every morning       Ondansetron (ZOFRAN ODT PO) Take 4 mg by mouth every 8 hours as needed for nausea       polyethylene glycol (MIRALAX/GLYCOLAX) Packet Take 17 g by mouth daily as needed        polyvinyl alcohol (LIQUIFILM TEARS) 1.4 % ophthalmic solution Place 1 drop into both eyes 2 times daily And 4 times a day PRN       senna-docusate (SENOKOT-S/PERICOLACE) 8.6-50 MG tablet Take 1 tablet by mouth 2 times daily And 1 tab every day PRN       SIMETHICONE-80 PO Take 160 mg by mouth 4 times daily as needed for intestinal gas        Medications reviewed:  Medications reconciled to facility chart and changes were made to reflect current medications as identified as above med list. Below are the changes that were made:   Medications stopped since last EPIC medication reconciliation:   There are no discontinued medications.    Medications started since last [facility] medication reconciliation:  No orders of the defined types were placed in this encounter.        REVIEW OF SYSTEMS:  4 point ROS including Respiratory, CV, GI and , other than that noted in the HPI,  is negative    Physical Exam:  /76   Pulse 72   Temp 97.8  F (36.6  C)   Resp 20   Wt 88.6 kg (195 lb 6.4 oz)   SpO2 93%   BMI 34.61 kg/m    GENERAL APPEARANCE:  Alert, in no distress,  oriented, morbidly obese, cooperative  RESP:  respiratory effort and palpation of chest normal, lungs clear to auscultation , no respiratory distress, cough occasonal, strong, productive  CV:  Palpation and auscultation of heart done , regular rate and rhythm, no murmur, rub, or gallop, +2 pedal pulses, peripheral edema 2+ in bilat feet, 1-2 bilat ankles  ABDOMEN:  normal bowel sounds, soft, nontender, no hepatosplenomegaly or other masses, no guarding or rebound  M/S:   decreased ROM to lumbar spine, small slow steps with gait, amulates with walker, no gross joint deformities  SKIN:  Inspection of skin and subcutaneous tissue baseline, wound healing well, no signs of infection dressing C/D/I, no drainage or erythema; palpable right groin seroma  NEURO:   Cranial nerves 2-12 are normal tested and grossly at patient's baseline, Examination of sensation by touch normal  PSYCH:  oriented X 3, normal insight, judgement and memory, affect and mood normal    Recent Labs:     CBC RESULTS:   Recent Labs   Lab Test 01/21/19  0749 01/03/19  0941 11/23/18  1042 11/14/18  0802   WBC  --   --  9.1 7.6   RBC  --   --  4.05 3.70*   HGB 9.6* 10.8* 11.1* 9.8*   HCT  --   --  36.8 32.9*   MCV  --   --  91 89   MCH  --   --  27.4 26.5   MCHC  --   --  30.2* 29.8*   RDW  --   --  17.0* 16.8*   PLT  --   --  202 193       Last Basic Metabolic Panel:  Recent Labs   Lab Test 01/21/19  0749 01/14/19  0651    142   POTASSIUM 3.9 4.7   CHLORIDE 103 108   BLAIR 9.0 8.9   CO2 33* 30   BUN 36* 24   CR 1.18* 1.03   GLC 90 87       Liver Function Studies -   Recent Labs   Lab Test 06/25/18  1020   ALBUMIN 3.5       TSH   Date Value Ref Range Status   08/14/2017 0.62 0.40 - 4.00 mU/L Final   04/18/2007 0.60 0.4 - 5.0 mU/L Final       Lab Results   Component Value Date    A1C 6.2 08/31/2017         Assessment/Plan:  (J20.9) Acute bronchitis, unspecified organism  (primary encounter diagnosis)  (J44.9) Chronic obstructive pulmonary disease,  unspecified COPD type (H)  Comment: Improving, no s/s of exacerbation  Plan: Continue steroid taper - will drop to 20mg prednisone x 2 days tomorrow, then 10mg  x2 days then discontinue. Continue continue xopenex nebs TID, cough syrup PRN per ADALBERTO, fluticasone-vilanterol 100-25 mcg daily and umeclidinium-vilanterol 62.5-25 mcg daily. Monitor and adjust.    (M48.062) Spinal stenosis of lumbar region with neurogenic claudication  (Z98.890) S/P lumbar laminectomy  (Z47.89) Aftercare following surgery of the musculoskeletal system  Comment: pain control improved, mobility improving, healing well, no s/s of infection   Plan: PT/OT, continue norco TID and 1-2 tab q4h PRN, flexeril PRN TID, APAP PRN, biofreeze rub, gabapentin 100mg q AM and 200mg at bedtime, continue to monitor and adjust. F/u with neurosurgery as scheduled.     (I10) Benign essential hypertension  Comment: elevated at times, but suspect d/t pain or anxiety, steroids   Plan: continue furosemide 40 mg daily in the morning 20 mg in the afternoon, Imdur 30 mg daily, isradipine 5 mg BID and lisinopril 5 mg daily; monitor and adjust    (D62) Anemia due to blood loss, acute  Comment: acute on chronic, reports she has previously been on an iron supplement, baseline ~10; she is 9.6 today (was 10.8 on 1/3).   Plan: Will add an iron supplement, Hgb in 1 week to check for stability.     Orders:  1. Ferrous sulfate 325 mg. 1 tab PO every day with breakfast   2. Hgb on 1/28/19 Dx: Anemia      Electronically signed by  MARY Gómez CNP

## 2019-01-23 ENCOUNTER — AMBULATORY - HEALTHEAST (OUTPATIENT)
Dept: NEUROSURGERY | Facility: CLINIC | Age: 84
End: 2019-01-23

## 2019-01-23 DIAGNOSIS — Z48.02 ENCOUNTER FOR STAPLE REMOVAL: ICD-10-CM

## 2019-01-23 ASSESSMENT — MIFFLIN-ST. JEOR: SCORE: 1283.64

## 2019-01-25 ENCOUNTER — NURSING HOME VISIT (OUTPATIENT)
Dept: GERIATRICS | Facility: CLINIC | Age: 84
End: 2019-01-25
Payer: COMMERCIAL

## 2019-01-25 VITALS
HEART RATE: 65 BPM | WEIGHT: 194 LBS | OXYGEN SATURATION: 93 % | TEMPERATURE: 97.6 F | SYSTOLIC BLOOD PRESSURE: 151 MMHG | BODY MASS INDEX: 34.37 KG/M2 | DIASTOLIC BLOOD PRESSURE: 74 MMHG | RESPIRATION RATE: 18 BRPM

## 2019-01-25 DIAGNOSIS — M25.551 HIP PAIN, RIGHT: ICD-10-CM

## 2019-01-25 DIAGNOSIS — R19.09 RIGHT GROIN MASS: Primary | ICD-10-CM

## 2019-01-25 PROCEDURE — 99309 SBSQ NF CARE MODERATE MDM 30: CPT | Performed by: NURSE PRACTITIONER

## 2019-01-25 RX ORDER — LISINOPRIL 10 MG/1
20 TABLET ORAL DAILY
COMMUNITY
End: 2019-02-05

## 2019-01-25 NOTE — PROGRESS NOTES
[place] [facility]    Chief Complaint   Patient presents with     [facility] Acute       [place] Medical Record Number:  [id number]  Place of Service where encounter took place:  [facility]) [[id number]]    HPI:    Jazmin Clifton is a 86 year old  (12/30/1932), who is being seen today for an episodic care visit.  HPI information obtained from: facility chart records, facility staff, patient report and [place] Epic chart review.     Today's concern is:  Right groin mass  Hip pain, right  Asked to see patient d/t pain in right hip. She has known seroma to right groin, it fluctuates in size, and when it becomes large cause pressure in her hip and causes her significant discomfort. She reports it hurts to walk and bear weight. She has previously been seen surgery, who stated it is likely the result of lymph node damage and recommended she wear Spanks for compression. She wore them briefly, but felt they were to uncomfortable and has not worn them in well over a month. She is reporting significant pain from the seroma today. Is asking if she should see another doctor to drain it as the previous surgeon she saw said it was encapsulated and would like come right back if drained. Told her another would like tell her the same thing, and she is likely to only get limited relief from draining it as it would like come back, and could potentially refill in days offering her only very short term relief. Discussed that compression was likely the only way to control her symptoms long term. .        REVIEW OF SYSTEMS:  4 point ROS including Respiratory, CV, GI and , other than that noted in the HPI,  is negative    /74   Pulse 65   Temp 97.6  F (36.4  C)   Resp 18   Wt 88 kg (194 lb)   SpO2 93%   BMI 34.37 kg/m    GENERAL APPEARANCE:  Alert, in no distress  RESP: Lungs clear t/o, on RA, no cough  CV: RRR, no r/g/m  GI: large, round, soft, + BS  SKIN: large fluid-filled massed to  right groin, mild tenderness, no erythema or excessive warmth  Neuro: CN 2-12 intact  Psych: A&O x3, mild forgetfulness    ASSESSMENT/PLAN:  (R19.09) Right groin mass  (primary encounter diagnosis)  (M25.551) Hip pain, right  Comment: does not appear infectious, growing likely d/t decreased mobility from recent surgery,   Plan: Discussed with patient importance of compression and that seroma likely not to improve without compression and likely only very limited benefit from draining seroma. PT cut down waist back from previously acquired spanks so they would not be as uncomfortable. Educated patient that compression above and below the site was important to try wear the Spanks as tolerated. Continue PT/OT, continue to monitor and adjust    Orders:  1. Spanks during the day as tolerated    Electronically signed by:  MARY Gómez CNP

## 2019-01-25 NOTE — LETTER
1/25/2019        RE: Jazmin Krishnamurthy [facility] [place]  [address] [place] [address]  [facility] [address]        [place] [facility]    Chief Complaint   Patient presents with     prison Acute       [place] Medical Record Number:  [id number]  Place of Service where encounter took place:  [facility] [[id number]]    HPI:    Jazmin Clifton is a 86 year old  (12/30/1932), who is being seen today for an episodic care visit.  HPI information obtained from: facility chart records, facility staff, patient report and [facility] chart review.     Today's concern is:  Right groin mass  Hip pain, right  Asked to see patient d/t pain in right hip. She has known seroma to right groin, it fluctuates in size, and when it becomes large cause pressure in her hip and causes her significant discomfort. She reports it hurts to walk and bear weight. She has previously been seen surgery, who stated it is likely the result of lymph node damage and recommended she wear Spanks for compression. She wore them briefly, but felt they were to uncomfortable and has not worn them in well over a month. She is reporting significant pain from the seroma today. Is asking if she should see another doctor to drain it as the previous surgeon she saw said it was encapsulated and would like come right back if drained. Told her another would like tell her the same thing, and she is likely to only get limited relief from draining it as it would like come back, and could potentially refill in days offering her only very short term relief. Discussed that compression was likely the only way to control her symptoms long term. .        REVIEW OF SYSTEMS:  4 point ROS including Respiratory, CV, GI and , other than that noted in the HPI,  is negative    /74   Pulse 65   Temp 97.6  F (36.4  C)   Resp 18   Wt 88 kg (194 lb)   SpO2 93%   BMI 34.37 kg/m     GENERAL APPEARANCE:  Alert, in no distress  RESP: Lungs  clear t/o, on RA, no cough  CV: RRR, no r/g/m  GI: large, round, soft, + BS  SKIN: large fluid-filled massed to right groin, mild tenderness, no erythema or excessive warmth  Neuro: CN 2-12 intact  Psych: A&O x3, mild forgetfulness    ASSESSMENT/PLAN:  (R19.09) Right groin mass  (primary encounter diagnosis)  (M25.551) Hip pain, right  Comment: does not appear infectious, growing likely d/t decreased mobility from recent surgery,   Plan: Discussed with patient importance of compression and that seroma likely not to improve without compression and likely only very limited benefit from draining seroma. PT cut down waist back from previously acquired spanks so they would not be as uncomfortable. Educated patient that compression above and below the site was important to try wear the Spanks as tolerated. Continue PT/OT, continue to monitor and adjust    Orders:  1. Spanks during the day as tolerated    Electronically signed by:  MARY Gómez CNP      Sincerely,        MARY Gómez CNP

## 2019-01-28 ENCOUNTER — DISCHARGE SUMMARY NURSING HOME (OUTPATIENT)
Dept: GERIATRICS | Facility: CLINIC | Age: 84
End: 2019-01-28
Payer: COMMERCIAL

## 2019-01-28 ENCOUNTER — HOSPITAL LABORATORY (OUTPATIENT)
Facility: OTHER | Age: 84
End: 2019-01-28

## 2019-01-28 VITALS
OXYGEN SATURATION: 93 % | RESPIRATION RATE: 18 BRPM | SYSTOLIC BLOOD PRESSURE: 144 MMHG | HEIGHT: 63 IN | HEART RATE: 67 BPM | TEMPERATURE: 97.4 F | WEIGHT: 194 LBS | DIASTOLIC BLOOD PRESSURE: 76 MMHG | BODY MASS INDEX: 34.38 KG/M2

## 2019-01-28 DIAGNOSIS — R19.09 RIGHT GROIN MASS: ICD-10-CM

## 2019-01-28 DIAGNOSIS — M25.551 HIP PAIN, RIGHT: ICD-10-CM

## 2019-01-28 DIAGNOSIS — D62 ANEMIA DUE TO BLOOD LOSS, ACUTE: ICD-10-CM

## 2019-01-28 DIAGNOSIS — M48.062 SPINAL STENOSIS OF LUMBAR REGION WITH NEUROGENIC CLAUDICATION: Primary | ICD-10-CM

## 2019-01-28 DIAGNOSIS — Z47.89 AFTERCARE FOLLOWING SURGERY OF THE MUSCULOSKELETAL SYSTEM: ICD-10-CM

## 2019-01-28 DIAGNOSIS — Z86.73 HISTORY OF CVA (CEREBROVASCULAR ACCIDENT): ICD-10-CM

## 2019-01-28 DIAGNOSIS — I50.9 CONGESTIVE HEART FAILURE, UNSPECIFIED HF CHRONICITY, UNSPECIFIED HEART FAILURE TYPE (H): ICD-10-CM

## 2019-01-28 DIAGNOSIS — R53.81 PHYSICAL DECONDITIONING: ICD-10-CM

## 2019-01-28 DIAGNOSIS — E78.5 HYPERLIPIDEMIA, UNSPECIFIED HYPERLIPIDEMIA TYPE: ICD-10-CM

## 2019-01-28 DIAGNOSIS — K59.01 SLOW TRANSIT CONSTIPATION: ICD-10-CM

## 2019-01-28 DIAGNOSIS — F03.90 DEMENTIA WITHOUT BEHAVIORAL DISTURBANCE, UNSPECIFIED DEMENTIA TYPE: ICD-10-CM

## 2019-01-28 DIAGNOSIS — Z98.890 S/P LUMBAR LAMINECTOMY: ICD-10-CM

## 2019-01-28 DIAGNOSIS — J20.9 ACUTE BRONCHITIS, UNSPECIFIED ORGANISM: ICD-10-CM

## 2019-01-28 DIAGNOSIS — M81.0 OSTEOPOROSIS WITHOUT CURRENT PATHOLOGICAL FRACTURE, UNSPECIFIED OSTEOPOROSIS TYPE: ICD-10-CM

## 2019-01-28 DIAGNOSIS — J44.9 CHRONIC OBSTRUCTIVE PULMONARY DISEASE, UNSPECIFIED COPD TYPE (H): ICD-10-CM

## 2019-01-28 DIAGNOSIS — N18.30 CKD (CHRONIC KIDNEY DISEASE) STAGE 3, GFR 30-59 ML/MIN (H): ICD-10-CM

## 2019-01-28 DIAGNOSIS — I10 BENIGN ESSENTIAL HYPERTENSION: ICD-10-CM

## 2019-01-28 DIAGNOSIS — F41.8 DEPRESSION WITH ANXIETY: ICD-10-CM

## 2019-01-28 DIAGNOSIS — K21.9 GASTROESOPHAGEAL REFLUX DISEASE WITHOUT ESOPHAGITIS: ICD-10-CM

## 2019-01-28 LAB — HGB BLD-MCNC: 10.4 G/DL (ref 11.7–15.7)

## 2019-01-28 PROCEDURE — 99316 NF DSCHRG MGMT 30 MIN+: CPT | Performed by: NURSE PRACTITIONER

## 2019-01-28 ASSESSMENT — MIFFLIN-ST. JEOR: SCORE: 1289.11

## 2019-01-28 NOTE — PROGRESS NOTES
[place] [facility] DISCHARGE SUMMARY    PATIENT'S NAME: Jazmin Clifton  YOB: 1932  MEDICAL RECORD NUMBER:  [id number]  Place of Service where encounter took place:  [facility] [[id number]]    PRIMARY CARE PROVIDER AND CLINIC RESPONSIBLE AFTER TRANSFER: Junie Estrella [address] [address] / [facility] [address]    TRANSFERRING PROVIDERS: MARY Gómez CNP, Ana Cristina Yu MD  DATE OF SNF ADMISSION:  January / 12 / 2019  DATE OF SNF (anticipated) DISCHARGE: January / 31 / 2019  DISCHARGE DISPOSITION: Assisted Living: [facility] ([facility])   RECENT HOSPITALIZATION/ED:  [facility]  stay 1/9/19 to 1/12/19.     CODE STATUS/ADVANCE DIRECTIVES DISCUSSION:   CPR/Full code      Allergies   Allergen Reactions     Accupril      Ace Inhibitors Unknown     Accupril     Augmentin Unknown     Levofloxacin Unknown, Muscle Pain (Myalgia) and Other (See Comments)     Comment: myalgias, Description:   Pt prescribed ciprofloxacin in Jan 2018 (no rxn documented)  Myalgias       Morphine Other (See Comments)     hallucinations     Nitrofurantoin Nausea and Vomiting and Unknown     Norvasc [Amlodipine Besylate]      Leg swelling       Quinapril Other (See Comments) and Unknown     Hypertension. 3.29.18 - Takes and tolerates lisinopril  Patient reports HTN with as reaction to this medication       Condition on Discharge:  Improving.  Function:  Assist of one with toileting, ambulating in hallway.  Cognitive Scores: Not complete as done 11/2018    Equipment: walker, wheelchair and electric scooter      DISCHARGE DIAGNOSIS:   1. Spinal stenosis of lumbar region with neurogenic claudication    2. S/P lumbar laminectomy    3. Aftercare following surgery of the musculoskeletal system    4. Benign essential hypertension    5. Anemia due to blood loss, acute    6. Hyperlipidemia, unspecified hyperlipidemia type    7. Congestive heart failure,  unspecified HF chronicity, unspecified heart failure type (H)    8. CKD (chronic kidney disease) stage 3, GFR 30-59 ml/min (H)    9. Dementia without behavioral disturbance, unspecified dementia type    10. Depression with anxiety    11. Right groin mass    12. Osteoporosis without current pathological fracture, unspecified osteoporosis type    13. Gastroesophageal reflux disease without esophagitis    14. Slow transit constipation    15. Physical deconditioning    16. History of CVA (cerebrovascular accident)    17. Chronic obstructive pulmonary disease, unspecified COPD type (H)    18. Acute bronchitis, unspecified organism    19. Hip pain, right        HPI Nursing Facility Course:  HPI information obtained from: facility chart records, facility staff, patient report and [facility] chart review.    From hospital discharge summary: Jazmin Clifton is a 86 y.o. female well known to provider with PMH of CHF, CKD, COPD, HTN, dementia, CVA, anemia and HLD who underwent LUMBAR 1-LUMBAR 2 BILATERAL LAMINECTOMY AND MEDIAL FACETECTOMY. Surgery was without complications. Postoperatively complicated by the development of UE tremors. Her pain medications and gabapentin were adjusted with some improvement. She did have elevation in her creatinine, but it returned to baseline by time of discharge.  Some doses were changed with her pain medications as well as her gabapentin. Her creatinine increased, so lasix placed on hold. Creatinine returned to baseline and lasix was resumed on discharge. She reported her mobility and pain were improved since prior to surgery. When medically stable she was discharged to TCU for further rehab and medical management.        Spinal stenosis of lumbar region with neurogenic claudication  S/P lumbar laminectomy  Aftercare following surgery of the musculoskeletal system  Physical deconditioning  Underwent L1-L2 laminectomy and medial facetectomy on 1/9/2019 with Dr Morris without complication. Pain  controlled in TCU on [place] TID and 1-2 tabs q4h PRN, reduced dose of gabapentin 100mg qAM and 200mg at bedtime, PRN APAP and cyclobenzaprine 5mg TID PRN for muscle spasms. No further tremors noted. PRN dosing of Norco rarely used so will reduced to 1 tabs PO q6h PRN.  Incision healing well without concern of infection. Staples and sutures removed at neurosurgery clinic on 1/23/19 without issue. Had improvement with mobility with PT and OT, will continue with outpatient PT. Will f/u with Dr. Morris as scheduled.     Chronic obstructive pulmonary disease, unspecified COPD type (H)  Acute bronchitis, unspecified organism  On 1/15 presented with barking cough wheezing. CXR done and consistent with bronchitis, negative for infiltrate. Influenza swab negative. She was started on prednisone 40mg PO x 5 days, then decreased by 10mg q 2 days until then discontinued. Symptoms improved to respiratory baseline. NO further cough, she remains afebrile. Continues on fluticasone-vilanterol 100-25 mcg daily and levalbuterol nebs    Benign essential hypertension  Hyperlipidemia, unspecified hyperlipidemia type  Congestive heart failure, unspecified HF chronicity, unspecified heart failure type (H)  Had elevate BP on admission, suspect somewhat related to pain. Lisinopril and spironolactone had been discontinued 9/2018 d/t hyperkalemia. Lisinopril resumed and 5mg and eventually titrated up to 10mg with some improvement, K+ remained WNL. She remains on ASA 81mg daily, atorvastatin 40mg at bedtime, lasix 40mg qAM, 20mg q afternoon, imdur 30mg daily, isradipine 5mg BID. Weight stable, no concern of exacerbation in TCU.        Anemia due to blood loss, acute  Acute on chronic. Baseline 10 range, was 9.2 post-operatively, recheck in TCU stable at 10.4. Remains on iron supplement daily.     CKD (chronic kidney disease) stage 3, GFR 30-59 ml/min (H)  Baseline 1.1-1.3, elevated as high as 2.44, improved to 1.17 prior to hospital discharge.  Stable at 1.18 in TCU.     Dementia without behavioral disturbance, unspecified dementia type  Fair historian, no behavioral concerns. Remains on Namenda 5mg daily and donepezil 5mg daily.      Depression with anxiety  Did have anxiety at times, but mood fairly stable. 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 Did wear them briefly in TCU as seroma noted to be getting larger and causing discomfort to her groin and hip. Did have reduction in size of seroma. She is again declining spanks, has ordered spandex incontinent undergarments with legs and is hoping they will be more comfortable and provide gentler compression.     Osteoporosis without current pathological fracture, unspecified osteoporosis type  Remains on calcium, vitamin D, alendronate.     Gastroesophageal reflux disease without esophagitis  No concerns - on omeprazole 40mg dialy, Tums PRN for breakthrough symptoms.     Slow transit constipation  Having regular bowel movements on senna-s 1 tab BID and daily PRN; PRN miralax discontinued as not used.     History of CVA (cerebrovascular accident)  Remains on ASA 81mg daily and atorvastation    Hip pain, right  Developed right hip pain on 1/27, she was concerned she had fractured her hip, thought no injury or fall noted. Had tenderness to right buttock, hip and thigh. Has had  issues with muscle spasms so suspect this is the likely cause. Also could be related to back pain as recently weaned off steroids. Hip x-ray showed DJD, no fracture or dislocation. Biofreee QID was added. Remains on PRN robaxin. Pain improved, TCU stay extended x1 day to allow for more therapy before discharge. Will discharge with outpatient PT.       PAST MEDICAL HISTORY:  has a past medical history of ABDOMINAL PAIN GENERALIZED (3/15/2006), Abdominal pain, generalized (3/15/2006), Atherosclerosis of renal artery (H), BENIGN  HYPERTENSION (5/1/2006), Benign neoplasm of scalp and skin of neck, Cerebral aneurysm, nonruptured, Depressive disorder, not elsewhere classified, Esophageal reflux, Female stress incontinence, Gastrointestinal malfunction arising from mental factors, Generalized osteoarthrosis, unspecified site, Herpes zoster dermatitis of eyelid, Insomnia, unspecified, Lumbago, Other chest pain, Other specified cardiac dysrhythmias(427.89), Rectocele, Unspecified disorder of kidney and ureter, and Unspecified essential hypertension.    DISCHARGE MEDICATIONS:  Current Outpatient Medications   Medication Sig Dispense Refill     acetaminophen (TYLENOL) 325 MG tablet Take 650 mg by mouth every 4 hours as needed for mild pain       Alendronate Sodium (FOSAMAX PO) Take 10 mg by mouth every morning (before breakfast)        Ascorbic Acid (VITAMIN C PO) Take 500 mg by mouth daily       aspirin 81 MG chewable tablet Take 81 mg by mouth       atorvastatin (LIPITOR) 40 MG tablet TAKE 1 TABLET BY MOUTH ONCE DAILY 31 tablet 98     CALCIUM 600+D 600-200 MG-UNIT TABS TAKE 1 TAB BY MOUTH ONCE DAILY 30 tablet 11     calcium carbonate (TUMS) 500 MG chewable tablet Take 1 chew tab by mouth every hour as needed for heartburn       cyclobenzaprine (FLEXERIL) 5 MG tablet Take 5 mg by mouth every 8 hours as needed for muscle spasms       DONEPEZIL HCL PO Take 5 mg by mouth daily       DULoxetine HCl (CYMBALTA PO) Take 30 mg by mouth daily       ferrous sulfate (FEROSUL) 325 (65 Fe) MG tablet Take 325 mg by mouth daily (with breakfast)       fluticasone-vilanterol (BREO ELLIPTA) 100-25 MCG/INH oral inhaler Inhale 1 puff into the lungs daily       Furosemide (LASIX PO) Take 40 mg by mouth daily And 20mg q afternoon.       GABAPENTIN PO Take 100 mg by mouth every morning       GABAPENTIN PO Take 200 mg by mouth At Bedtime        guaiFENesin (ROBITUSSIN) 100 MG/5ML SYRP Take 10 mLs by mouth every 4 hours as needed for cough        HYDROcodone-acetaminophen  (NORCO) 5-325 MG tablet Take 1 tablet by mouth 3 times daily       HYDROcodone-acetaminophen (NORCO) 5-325 MG tablet Take 1-2 tablets by mouth every 4 hours as needed for severe pain        hydrocortisone 1 % CREA cream Apply topically every 6 hours as needed for itching       isosorbide mononitrate (IMDUR) 30 MG 24 hr tablet TAKE 1 TABLET BY MOUTH ONCE DAILY 31 tablet 98     isradipine (DYNACIRC) 5 MG capsule TAKE 1 CAPSULE BY MOUTH TWICE DAILY 62 capsule 98     levalbuterol (XOPENEX) 1.25 MG/3ML neb solution Take 1 ampule by nebulization 3 times daily And every 4 hours PRN       lisinopril (PRINIVIL/ZESTRIL) 10 MG tablet Take 10 mg by mouth daily       loperamide (IMODIUM) 2 MG capsule Take 2 mg by mouth 4 times daily as needed for diarrhea       magnesium hydroxide (MILK OF MAGNESIA) 400 MG/5ML suspension Take 30 mLs by mouth daily as needed for constipation or heartburn       MAGNESIUM OXIDE PO Take 250 mg by mouth daily       melatonin 3 MG CAPS Take 3 mg by mouth At Bedtime       memantine (NAMENDA) 5 MG tablet TAKE 1 TABLET BY MOUTH ONCE DAILY 31 tablet 98     Menthol, Topical Analgesic, (BIOFREEZE) 4 % GEL Externally apply topically 2 times daily Also Q4H PRN       nystatin (MYCOSTATIN) 377902 UNIT/GM POWD Apply topically 2 times daily as needed        OMEPRAZOLE PO Take 40 mg by mouth every morning       Ondansetron (ZOFRAN ODT PO) Take 4 mg by mouth every 8 hours as needed for nausea       polyethylene glycol (MIRALAX/GLYCOLAX) Packet Take 17 g by mouth daily as needed        polyvinyl alcohol (LIQUIFILM TEARS) 1.4 % ophthalmic solution Place 1 drop into both eyes 2 times daily And 4 times a day PRN       senna-docusate (SENOKOT-S/PERICOLACE) 8.6-50 MG tablet Take 1 tablet by mouth 2 times daily And 1 tab every day PRN       SIMETHICONE-80 PO Take 160 mg by mouth 4 times daily as needed for intestinal gas          MEDICATION CHANGES/RATIONALE:   Lisinopril 10mg re-started d/t HTN  Tapered off  "steroids  Discontinued imdium, miralax, milk of mag, hydrocortisone cream, simethicone, and zofran as not used.   Other medication changes as above.     Controlled medications sent with patient:   Script for Norco medication for 120 tabs and 0 refills given to patient at dischage to have them fill at their out patient pharmacy  Medication: Norco , 4 tabs given to patient at the time of discharge to take home     ROS:    10 point ROS of systems including Constitutional, Eyes, Respiratory, Cardiovascular, Gastroenterology, Genitourinary, Integumentary, Musculoskeletal, Psychiatric were all negative except for pertinent positives noted in my HPI.    Physical Exam:   Vitals: /76   Pulse 67   Temp 97.4  F (36.3  C)   Resp 18   Ht 1.6 m (5' 3\")   Wt 88 kg (194 lb)   SpO2 93%   BMI 34.37 kg/m    BMI= Body mass index is 34.37 kg/m .    GENERAL APPEARANCE:  Alert, in no distress, oriented, morbidly obese, cooperative  RESP:  respiratory effort and palpation of chest normal, lungs clear to auscultation , no respiratory distress, on RA  CV:  Palpation and auscultation of heart done , regular rate and rhythm, no murmur, rub, or gallop, +2 pedal pulses, peripheral edema 1+ in BLE  ABDOMEN:  normal bowel sounds, soft, nontender, no hepatosplenomegaly or other masses, large, round, soft, no guarding or rebound  M/S:   limited ambulation, tenderness to right low back/buttock/hip/thigh, decreased ROM to right hip, no other gross deformities  SKIN:  Inspection of skin and subcutaneous tissue baseline, Palpation of skin and subcutaneous tissue baseline, wound healing well, no signs of infection healin surgical incision to lumbar spine - C/D/I,  NEURO:   Cranial nerves 2-12 are normal tested and grossly at patient's baseline, Examination of sensation by touch normal  PSYCH:  oriented X 3, normal insight, judgement and memory, affect and mood normal, anxious at times    DISCHARGE PLAN:  Outpatient PT  Patient instructed to " follow-up with:  PCP in 1-2 weeks       [facility] scheduled appointments:  No future appointments.    MTM referral needed and placed by this provider: No    Pending labs: None    SNF labs   CBC RESULTS:   Recent Labs   Lab Test 01/28/19  0740 01/21/19  0749  11/23/18  1042 11/14/18  0802   WBC  --   --   --  9.1 7.6   RBC  --   --   --  4.05 3.70*   HGB 10.4* 9.6*   < > 11.1* 9.8*   HCT  --   --   --  36.8 32.9*   MCV  --   --   --  91 89   MCH  --   --   --  27.4 26.5   MCHC  --   --   --  30.2* 29.8*   RDW  --   --   --  17.0* 16.8*   PLT  --   --   --  202 193    < > = values in this interval not displayed.       Last Basic Metabolic Panel:  Recent Labs   Lab Test 01/21/19  0749 01/14/19  0651    142   POTASSIUM 3.9 4.7   CHLORIDE 103 108   BLAIR 9.0 8.9   CO2 33* 30   BUN 36* 24   CR 1.18* 1.03   GLC 90 87       Liver Function Studies -   Recent Labs   Lab Test 06/25/18  1020   ALBUMIN 3.5       TSH   Date Value Ref Range Status   08/14/2017 0.62 0.40 - 4.00 mU/L Final   04/18/2007 0.60 0.4 - 5.0 mU/L Final       Lab Results   Component Value Date    A1C 6.2 08/31/2017        Discharge Treatments:  F/u with neurosurgery as scheduled  Medications as above  Activity as tolerated    Orders:  1. Biofreeze 4 or 5% apply QID to right hip and buttock Dx: Pain    TOTAL DISCHARGE TIME:   Greater than 30 minutes  Electronically signed by:  MARY Gómez CNP

## 2019-01-28 NOTE — LETTER
1/28/2019        RE: [facility] [place]  [address]  [place] [address]          [facility] DISCHARGE SUMMARY    PATIENT'S NAME: Jazmin Clifton  YOB: 1932  MEDICAL RECORD NUMBER:  [id number]  Place of Service where encounter took place:  [facility] ([facility]) [[id number]]    PRIMARY CARE PROVIDER AND CLINIC RESPONSIBLE AFTER TRANSFER: Junie Estrella [address] [address] / [place] [id number]    TRANSFERRING PROVIDERS: MARY Gómez CNP, Ana Cristina Yu MD  DATE OF SNF ADMISSION:  January / 12 / 2019  DATE OF SNF (anticipated) DISCHARGE: January / 31 / 2019  DISCHARGE DISPOSITION: Assisted Living: [facility])   RECENT HOSPITALIZATION/ED:  [facility]  stay 1/9/19 to 1/12/19.     CODE STATUS/ADVANCE DIRECTIVES DISCUSSION:   CPR/Full code      Allergies   Allergen Reactions     Accupril      Ace Inhibitors Unknown     Accupril     Augmentin Unknown     Levofloxacin Unknown, Muscle Pain (Myalgia) and Other (See Comments)     Comment: myalgias, Description:   Pt prescribed ciprofloxacin in Jan 2018 (no rxn documented)  Myalgias       Morphine Other (See Comments)     hallucinations     Nitrofurantoin Nausea and Vomiting and Unknown     Norvasc [Amlodipine Besylate]      Leg swelling       Quinapril Other (See Comments) and Unknown     Hypertension. 3.29.18 - Takes and tolerates lisinopril  Patient reports HTN with as reaction to this medication       Condition on Discharge:  Improving.  Function:  Assist of one with toileting, ambulating in hallway.  Cognitive Scores: Not complete as done 11/2018    Equipment: walker, wheelchair and electric scooter      DISCHARGE DIAGNOSIS:   1. Spinal stenosis of lumbar region with neurogenic claudication    2. S/P lumbar laminectomy    3. Aftercare following surgery of the musculoskeletal system    4. Benign essential hypertension    5. Anemia due  to blood loss, acute    6. Hyperlipidemia, unspecified hyperlipidemia type    7. Congestive heart failure, unspecified HF chronicity, unspecified heart failure type (H)    8. CKD (chronic kidney disease) stage 3, GFR 30-59 ml/min (H)    9. Dementia without behavioral disturbance, unspecified dementia type    10. Depression with anxiety    11. Right groin mass    12. Osteoporosis without current pathological fracture, unspecified osteoporosis type    13. Gastroesophageal reflux disease without esophagitis    14. Slow transit constipation    15. Physical deconditioning    16. History of CVA (cerebrovascular accident)    17. Chronic obstructive pulmonary disease, unspecified COPD type (H)    18. Acute bronchitis, unspecified organism    19. Hip pain, right        HPI Nursing Facility Course:  HPI information obtained from: facility chart records, facility staff, patient report and [facility] chart review.    From hospital discharge summary: Jazmin Clifton is a 86 y.o. female well known to provider with PMH of CHF, CKD, COPD, HTN, dementia, CVA, anemia and HLD who underwent LUMBAR 1-LUMBAR 2 BILATERAL LAMINECTOMY AND MEDIAL FACETECTOMY. Surgery was without complications. Postoperatively complicated by the development of UE tremors. Her pain medications and gabapentin were adjusted with some improvement. She did have elevation in her creatinine, but it returned to baseline by time of discharge.  Some doses were changed with her pain medications as well as her gabapentin. Her creatinine increased, so lasix placed on hold. Creatinine returned to baseline and lasix was resumed on discharge. She reported her mobility and pain were improved since prior to surgery. When medically stable she was discharged to TCU for further rehab and medical management.        Spinal stenosis of lumbar region with neurogenic claudication  S/P lumbar laminectomy  Aftercare following surgery of the musculoskeletal system  Physical  deconditioning  Underwent L1-L2 laminectomy and medial facetectomy on 1/9/2019 with Dr Morris without complication. Pain controlled in TCU on [place] TID and 1-2 tabs q4h PRN, reduced dose of gabapentin 100mg qAM and 200mg at bedtime, PRN APAP and cyclobenzaprine 5mg TID PRN for muscle spasms. No further tremors noted. PRN dosing of Norco rarely used so will reduced to 1 tabs PO q6h PRN.  Incision healing well without concern of infection. Staples and sutures removed at neurosurgery clinic on 1/23/19 without issue. Had improvement with mobility with PT and OT, will continue with outpatient PT. Will f/u with Dr. Morris as scheduled.     Chronic obstructive pulmonary disease, unspecified COPD type (H)  Acute bronchitis, unspecified organism  On 1/15 presented with barking cough wheezing. CXR done and consistent with bronchitis, negative for infiltrate. Influenza swab negative. She was started on prednisone 40mg PO x 5 days, then decreased by 10mg q 2 days until then discontinued. Symptoms improved to respiratory baseline. NO further cough, she remains afebrile. Continues on fluticasone-vilanterol 100-25 mcg daily and levalbuterol nebs    Benign essential hypertension  Hyperlipidemia, unspecified hyperlipidemia type  Congestive heart failure, unspecified HF chronicity, unspecified heart failure type (H)  Had elevate BP on admission, suspect somewhat related to pain. Lisinopril and spironolactone had been discontinued 9/2018 d/t hyperkalemia. Lisinopril resumed and 5mg and eventually titrated up to 10mg with some improvement, K+ remained WNL. She remains on ASA 81mg daily, atorvastatin 40mg at bedtime, lasix 40mg qAM, 20mg q afternoon, imdur 30mg daily, isradipine 5mg BID. Weight stable, no concern of exacerbation in TCU.        Anemia due to blood loss, acute  Acute on chronic. Baseline 10 range, was 9.2 post-operatively, recheck in TCU stable at 10.4. Remains on iron supplement daily.     CKD (chronic kidney disease) stage  3, GFR 30-59 ml/min (H)  Baseline 1.1-1.3, elevated as high as 2.44, improved to 1.17 prior to hospital discharge. Stable at 1.18 in TCU.     Dementia without behavioral disturbance, unspecified dementia type  Fair historian, no behavioral concerns. Remains on Namenda 5mg daily and donepezil 5mg daily.      Depression with anxiety  Did have anxiety at times, but mood fairly stable. 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 Did wear them briefly in TCU as seroma noted to be getting larger and causing discomfort to her groin and hip. Did have reduction in size of seroma. She is again declining spanks, has ordered spandex incontinent undergarments with legs and is hoping they will be more comfortable and provide gentler compression.     Osteoporosis without current pathological fracture, unspecified osteoporosis type  Remains on calcium, vitamin D, alendronate.     Gastroesophageal reflux disease without esophagitis  No concerns - on omeprazole 40mg dialy, Tums PRN for breakthrough symptoms.     Slow transit constipation  Having regular bowel movements on senna-s 1 tab BID and daily PRN; PRN miralax discontinued as not used.     History of CVA (cerebrovascular accident)  Remains on ASA 81mg daily and atorvastation    Hip pain, right  Developed right hip pain on 1/27, she was concerned she had fractured her hip, thought no injury or fall noted. Had tenderness to right buttock, hip and thigh. Has had  issues with muscle spasms so suspect this is the likely cause. Also could be related to back pain as recently weaned off steroids. Hip x-ray showed DJD, no fracture or dislocation. Biofreee QID was added. Remains on PRN robaxin. Pain improved, TCU stay extended x1 day to allow for more therapy before discharge. Will discharge with outpatient PT.       PAST MEDICAL HISTORY:  has a past medical history of ABDOMINAL  PAIN GENERALIZED (3/15/2006), Abdominal pain, generalized (3/15/2006), Atherosclerosis of renal artery (H), BENIGN HYPERTENSION (5/1/2006), Benign neoplasm of scalp and skin of neck, Cerebral aneurysm, nonruptured, Depressive disorder, not elsewhere classified, Esophageal reflux, Female stress incontinence, Gastrointestinal malfunction arising from mental factors, Generalized osteoarthrosis, unspecified site, Herpes zoster dermatitis of eyelid, Insomnia, unspecified, Lumbago, Other chest pain, Other specified cardiac dysrhythmias(427.89), Rectocele, Unspecified disorder of kidney and ureter, and Unspecified essential hypertension.    DISCHARGE MEDICATIONS:  Current Outpatient Medications   Medication Sig Dispense Refill     acetaminophen (TYLENOL) 325 MG tablet Take 650 mg by mouth every 4 hours as needed for mild pain       Alendronate Sodium (FOSAMAX PO) Take 10 mg by mouth every morning (before breakfast)        Ascorbic Acid (VITAMIN C PO) Take 500 mg by mouth daily       aspirin 81 MG chewable tablet Take 81 mg by mouth       atorvastatin (LIPITOR) 40 MG tablet TAKE 1 TABLET BY MOUTH ONCE DAILY 31 tablet 98     CALCIUM 600+D 600-200 MG-UNIT TABS TAKE 1 TAB BY MOUTH ONCE DAILY 30 tablet 11     calcium carbonate (TUMS) 500 MG chewable tablet Take 1 chew tab by mouth every hour as needed for heartburn       cyclobenzaprine (FLEXERIL) 5 MG tablet Take 5 mg by mouth every 8 hours as needed for muscle spasms       DONEPEZIL HCL PO Take 5 mg by mouth daily       DULoxetine HCl (CYMBALTA PO) Take 30 mg by mouth daily       ferrous sulfate (FEROSUL) 325 (65 Fe) MG tablet Take 325 mg by mouth daily (with breakfast)       fluticasone-vilanterol (BREO ELLIPTA) 100-25 MCG/INH oral inhaler Inhale 1 puff into the lungs daily       Furosemide (LASIX PO) Take 40 mg by mouth daily And 20mg q afternoon.       GABAPENTIN PO Take 100 mg by mouth every morning       GABAPENTIN PO Take 200 mg by mouth At Bedtime        guaiFENesin  (ROBITUSSIN) 100 MG/5ML SYRP Take 10 mLs by mouth every 4 hours as needed for cough        HYDROcodone-acetaminophen (NORCO) 5-325 MG tablet Take 1 tablet by mouth 3 times daily       HYDROcodone-acetaminophen (NORCO) 5-325 MG tablet Take 1-2 tablets by mouth every 4 hours as needed for severe pain        hydrocortisone 1 % CREA cream Apply topically every 6 hours as needed for itching       isosorbide mononitrate (IMDUR) 30 MG 24 hr tablet TAKE 1 TABLET BY MOUTH ONCE DAILY 31 tablet 98     isradipine (DYNACIRC) 5 MG capsule TAKE 1 CAPSULE BY MOUTH TWICE DAILY 62 capsule 98     levalbuterol (XOPENEX) 1.25 MG/3ML neb solution Take 1 ampule by nebulization 3 times daily And every 4 hours PRN       lisinopril (PRINIVIL/ZESTRIL) 10 MG tablet Take 10 mg by mouth daily       loperamide (IMODIUM) 2 MG capsule Take 2 mg by mouth 4 times daily as needed for diarrhea       magnesium hydroxide (MILK OF MAGNESIA) 400 MG/5ML suspension Take 30 mLs by mouth daily as needed for constipation or heartburn       MAGNESIUM OXIDE PO Take 250 mg by mouth daily       melatonin 3 MG CAPS Take 3 mg by mouth At Bedtime       memantine (NAMENDA) 5 MG tablet TAKE 1 TABLET BY MOUTH ONCE DAILY 31 tablet 98     Menthol, Topical Analgesic, (BIOFREEZE) 4 % GEL Externally apply topically 2 times daily Also Q4H PRN       nystatin (MYCOSTATIN) 462464 UNIT/GM POWD Apply topically 2 times daily as needed        OMEPRAZOLE PO Take 40 mg by mouth every morning       Ondansetron (ZOFRAN ODT PO) Take 4 mg by mouth every 8 hours as needed for nausea       polyethylene glycol (MIRALAX/GLYCOLAX) Packet Take 17 g by mouth daily as needed        polyvinyl alcohol (LIQUIFILM TEARS) 1.4 % ophthalmic solution Place 1 drop into both eyes 2 times daily And 4 times a day PRN       senna-docusate (SENOKOT-S/PERICOLACE) 8.6-50 MG tablet Take 1 tablet by mouth 2 times daily And 1 tab every day PRN       SIMETHICONE-80 PO Take 160 mg by mouth 4 times daily as needed for  "intestinal gas          MEDICATION CHANGES/RATIONALE:   Lisinopril 10mg re-started d/t HTN  Tapered off steroids  Discontinued imdium, miralax, milk of mag, hydrocortisone cream, simethicone, and zofran as not used.   Other medication changes as above.     Controlled medications sent with patient:   Script for Norco medication for 120 tabs and 0 refills given to patient at dischage to have them fill at their out patient pharmacy  Medication: Norco , 4 tabs given to patient at the time of discharge to take home     ROS:    10 point ROS of systems including Constitutional, Eyes, Respiratory, Cardiovascular, Gastroenterology, Genitourinary, Integumentary, Musculoskeletal, Psychiatric were all negative except for pertinent positives noted in my HPI.    Physical Exam:   Vitals: /76   Pulse 67   Temp 97.4  F (36.3  C)   Resp 18   Ht 1.6 m (5' 3\")   Wt 88 kg (194 lb)   SpO2 93%   BMI 34.37 kg/m     BMI= Body mass index is 34.37 kg/m .    GENERAL APPEARANCE:  Alert, in no distress, oriented, morbidly obese, cooperative  RESP:  respiratory effort and palpation of chest normal, lungs clear to auscultation , no respiratory distress, on RA  CV:  Palpation and auscultation of heart done , regular rate and rhythm, no murmur, rub, or gallop, +2 pedal pulses, peripheral edema 1+ in BLE  ABDOMEN:  normal bowel sounds, soft, nontender, no hepatosplenomegaly or other masses, large, round, soft, no guarding or rebound  M/S:   limited ambulation, tenderness to right low back/buttock/hip/thigh, decreased ROM to right hip, no other gross deformities  SKIN:  Inspection of skin and subcutaneous tissue baseline, Palpation of skin and subcutaneous tissue baseline, wound healing well, no signs of infection healin surgical incision to lumbar spine - C/D/I,  NEURO:   Cranial nerves 2-12 are normal tested and grossly at patient's baseline, Examination of sensation by touch normal  PSYCH:  oriented X 3, normal insight, judgement and " memory, affect and mood normal, anxious at times    DISCHARGE PLAN:  Outpatient PT  Patient instructed to follow-up with:  PCP in 1-2 weeks       [facility] scheduled appointments:  No future appointments.    MTM referral needed and placed by this provider: No    Pending labs: None    SNF labs   CBC RESULTS:   Recent Labs   Lab Test 01/28/19  0740 01/21/19  0749  11/23/18  1042 11/14/18  0802   WBC  --   --   --  9.1 7.6   RBC  --   --   --  4.05 3.70*   HGB 10.4* 9.6*   < > 11.1* 9.8*   HCT  --   --   --  36.8 32.9*   MCV  --   --   --  91 89   MCH  --   --   --  27.4 26.5   MCHC  --   --   --  30.2* 29.8*   RDW  --   --   --  17.0* 16.8*   PLT  --   --   --  202 193    < > = values in this interval not displayed.       Last Basic Metabolic Panel:  Recent Labs   Lab Test 01/21/19  0749 01/14/19  0651    142   POTASSIUM 3.9 4.7   CHLORIDE 103 108   BLAIR 9.0 8.9   CO2 33* 30   BUN 36* 24   CR 1.18* 1.03   GLC 90 87       Liver Function Studies -   Recent Labs   Lab Test 06/25/18  1020   ALBUMIN 3.5       TSH   Date Value Ref Range Status   08/14/2017 0.62 0.40 - 4.00 mU/L Final   04/18/2007 0.60 0.4 - 5.0 mU/L Final       Lab Results   Component Value Date    A1C 6.2 08/31/2017        Discharge Treatments:  F/u with neurosurgery as scheduled  Medications as above  Activity as tolerated    Orders:  1. Biofreeze 4 or 5% apply QID to right hip and buttock Dx: Pain    TOTAL DISCHARGE TIME:   Greater than 30 minutes  Electronically signed by:  MARY Gómez CNP      Sincerely,        MARY Gómez CNP

## 2019-01-28 NOTE — LETTER
1/28/2019        RE: [facility]  [address]  [place] [address]          [facility] DISCHARGE SUMMARY    PATIENT'S NAME: Jazmin Clifton  YOB: 1932  MEDICAL RECORD NUMBER:  [id number]  Place of Service where encounter took place:  [facility] ([facility] [[id number]]    PRIMARY CARE PROVIDER AND CLINIC RESPONSIBLE AFTER TRANSFER: Junie Estrella [address] [address] / [place] [address]    TRANSFERRING PROVIDERS: MARY Gómez CNP, Ana Cristina Yu MD  DATE OF SNF ADMISSION:  January / 12 / 2019  DATE OF SNF (anticipated) DISCHARGE: January / 31 / 2019  DISCHARGE DISPOSITION: Assisted Living: [facility] ([facility])   RECENT HOSPITALIZATION/ED:  [facility]  stay 1/9/19 to 1/12/19.     CODE STATUS/ADVANCE DIRECTIVES DISCUSSION:   CPR/Full code      Allergies   Allergen Reactions     Accupril      Ace Inhibitors Unknown     Accupril     Augmentin Unknown     Levofloxacin Unknown, Muscle Pain (Myalgia) and Other (See Comments)     Comment: myalgias, Description:   Pt prescribed ciprofloxacin in Jan 2018 (no rxn documented)  Myalgias       Morphine Other (See Comments)     hallucinations     Nitrofurantoin Nausea and Vomiting and Unknown     Norvasc [Amlodipine Besylate]      Leg swelling       Quinapril Other (See Comments) and Unknown     Hypertension. 3.29.18 - Takes and tolerates lisinopril  Patient reports HTN with as reaction to this medication       Condition on Discharge:  Improving.  Function:  Assist of one with toileting, ambulating in hallway.  Cognitive Scores: Not complete as done 11/2018    Equipment: walker, wheelchair and electric scooter      DISCHARGE DIAGNOSIS:   1. Spinal stenosis of lumbar region with neurogenic claudication    2. S/P lumbar laminectomy    3. Aftercare following surgery of the musculoskeletal system    4. Benign essential hypertension    5. Anemia due  to blood loss, acute    6. Hyperlipidemia, unspecified hyperlipidemia type    7. Congestive heart failure, unspecified HF chronicity, unspecified heart failure type (H)    8. CKD (chronic kidney disease) stage 3, GFR 30-59 ml/min (H)    9. Dementia without behavioral disturbance, unspecified dementia type    10. Depression with anxiety    11. Right groin mass    12. Osteoporosis without current pathological fracture, unspecified osteoporosis type    13. Gastroesophageal reflux disease without esophagitis    14. Slow transit constipation    15. Physical deconditioning    16. History of CVA (cerebrovascular accident)    17. Chronic obstructive pulmonary disease, unspecified COPD type (H)    18. Acute bronchitis, unspecified organism    19. Hip pain, right        HPI Nursing Facility Course:  HPI information obtained from: facility chart records, facility staff, patient report and [facility] chart review.    From hospital discharge summary: Jazmin Clifton is a 86 y.o. female well known to provider with PMH of CHF, CKD, COPD, HTN, dementia, CVA, anemia and HLD who underwent LUMBAR 1-LUMBAR 2 BILATERAL LAMINECTOMY AND MEDIAL FACETECTOMY. Surgery was without complications. Postoperatively complicated by the development of UE tremors. Her pain medications and gabapentin were adjusted with some improvement. She did have elevation in her creatinine, but it returned to baseline by time of discharge.  Some doses were changed with her pain medications as well as her gabapentin. Her creatinine increased, so lasix placed on hold. Creatinine returned to baseline and lasix was resumed on discharge. She reported her mobility and pain were improved since prior to surgery. When medically stable she was discharged to TCU for further rehab and medical management.        Spinal stenosis of lumbar region with neurogenic claudication  S/P lumbar laminectomy  Aftercare following surgery of the musculoskeletal system  Physical  deconditioning  Underwent L1-L2 laminectomy and medial facetectomy on 1/9/2019 with Dr Morris without complication. Pain controlled in TCU on [place] TID and 1-2 tabs q4h PRN, reduced dose of gabapentin 100mg qAM and 200mg at bedtime, PRN APAP and cyclobenzaprine 5mg TID PRN for muscle spasms. No further tremors noted. PRN dosing of Norco rarely used so will reduced to 1 tabs PO q6h PRN.  Incision healing well without concern of infection. Staples and sutures removed at neurosurgery clinic on 1/23/19 without issue. Had improvement with mobility with PT and OT, will continue with outpatient PT. Will f/u with Dr. Morris as scheduled.     Chronic obstructive pulmonary disease, unspecified COPD type (H)  Acute bronchitis, unspecified organism  On 1/15 presented with barking cough wheezing. CXR done and consistent with bronchitis, negative for infiltrate. Influenza swab negative. She was started on prednisone 40mg PO x 5 days, then decreased by 10mg q 2 days until then discontinued. Symptoms improved to respiratory baseline. NO further cough, she remains afebrile. Continues on fluticasone-vilanterol 100-25 mcg daily and levalbuterol nebs    Benign essential hypertension  Hyperlipidemia, unspecified hyperlipidemia type  Congestive heart failure, unspecified HF chronicity, unspecified heart failure type (H)  Had elevate BP on admission, suspect somewhat related to pain. Lisinopril and spironolactone had been discontinued 9/2018 d/t hyperkalemia. Lisinopril resumed and 5mg and eventually titrated up to 10mg with some improvement, K+ remained WNL. She remains on ASA 81mg daily, atorvastatin 40mg at bedtime, lasix 40mg qAM, 20mg q afternoon, imdur 30mg daily, isradipine 5mg BID. Weight stable, no concern of exacerbation in TCU.        Anemia due to blood loss, acute  Acute on chronic. Baseline 10 range, was 9.2 post-operatively, recheck in TCU stable at 10.4. Remains on iron supplement daily.     CKD (chronic kidney disease) stage  3, GFR 30-59 ml/min (H)  Baseline 1.1-1.3, elevated as high as 2.44, improved to 1.17 prior to hospital discharge. Stable at 1.18 in TCU.     Dementia without behavioral disturbance, unspecified dementia type  Fair historian, no behavioral concerns. Remains on Namenda 5mg daily and donepezil 5mg daily.      Depression with anxiety  Did have anxiety at times, but mood fairly stable. 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 Did wear them briefly in TCU as seroma noted to be getting larger and causing discomfort to her groin and hip. Did have reduction in size of seroma. She is again declining spanks, has ordered spandex incontinent undergarments with legs and is hoping they will be more comfortable and provide gentler compression.     Osteoporosis without current pathological fracture, unspecified osteoporosis type  Remains on calcium, vitamin D, alendronate.     Gastroesophageal reflux disease without esophagitis  No concerns - on omeprazole 40mg dialy, Tums PRN for breakthrough symptoms.     Slow transit constipation  Having regular bowel movements on senna-s 1 tab BID and daily PRN; PRN miralax discontinued as not used.     History of CVA (cerebrovascular accident)  Remains on ASA 81mg daily and atorvastation    Hip pain, right  Developed right hip pain on 1/27, she was concerned she had fractured her hip, thought no injury or fall noted. Had tenderness to right buttock, hip and thigh. Has had  issues with muscle spasms so suspect this is the likely cause. Also could be related to back pain as recently weaned off steroids. Hip x-ray showed DJD, no fracture or dislocation. Biofreee QID was added. Remains on PRN robaxin. Pain improved, TCU stay extended x1 day to allow for more therapy before discharge. Will discharge with outpatient PT.       PAST MEDICAL HISTORY:  has a past medical history of ABDOMINAL  PAIN GENERALIZED (3/15/2006), Abdominal pain, generalized (3/15/2006), Atherosclerosis of renal artery (H), BENIGN HYPERTENSION (5/1/2006), Benign neoplasm of scalp and skin of neck, Cerebral aneurysm, nonruptured, Depressive disorder, not elsewhere classified, Esophageal reflux, Female stress incontinence, Gastrointestinal malfunction arising from mental factors, Generalized osteoarthrosis, unspecified site, Herpes zoster dermatitis of eyelid, Insomnia, unspecified, Lumbago, Other chest pain, Other specified cardiac dysrhythmias(427.89), Rectocele, Unspecified disorder of kidney and ureter, and Unspecified essential hypertension.    DISCHARGE MEDICATIONS:  Current Outpatient Medications   Medication Sig Dispense Refill     acetaminophen (TYLENOL) 325 MG tablet Take 650 mg by mouth every 4 hours as needed for mild pain       Alendronate Sodium (FOSAMAX PO) Take 10 mg by mouth every morning (before breakfast)        Ascorbic Acid (VITAMIN C PO) Take 500 mg by mouth daily       aspirin 81 MG chewable tablet Take 81 mg by mouth       atorvastatin (LIPITOR) 40 MG tablet TAKE 1 TABLET BY MOUTH ONCE DAILY 31 tablet 98     CALCIUM 600+D 600-200 MG-UNIT TABS TAKE 1 TAB BY MOUTH ONCE DAILY 30 tablet 11     calcium carbonate (TUMS) 500 MG chewable tablet Take 1 chew tab by mouth every hour as needed for heartburn       cyclobenzaprine (FLEXERIL) 5 MG tablet Take 5 mg by mouth every 8 hours as needed for muscle spasms       DONEPEZIL HCL PO Take 5 mg by mouth daily       DULoxetine HCl (CYMBALTA PO) Take 30 mg by mouth daily       ferrous sulfate (FEROSUL) 325 (65 Fe) MG tablet Take 325 mg by mouth daily (with breakfast)       fluticasone-vilanterol (BREO ELLIPTA) 100-25 MCG/INH oral inhaler Inhale 1 puff into the lungs daily       Furosemide (LASIX PO) Take 40 mg by mouth daily And 20mg q afternoon.       GABAPENTIN PO Take 100 mg by mouth every morning       GABAPENTIN PO Take 200 mg by mouth At Bedtime        guaiFENesin  (ROBITUSSIN) 100 MG/5ML SYRP Take 10 mLs by mouth every 4 hours as needed for cough        HYDROcodone-acetaminophen (NORCO) 5-325 MG tablet Take 1 tablet by mouth 3 times daily       HYDROcodone-acetaminophen (NORCO) 5-325 MG tablet Take 1-2 tablets by mouth every 4 hours as needed for severe pain        hydrocortisone 1 % CREA cream Apply topically every 6 hours as needed for itching       isosorbide mononitrate (IMDUR) 30 MG 24 hr tablet TAKE 1 TABLET BY MOUTH ONCE DAILY 31 tablet 98     isradipine (DYNACIRC) 5 MG capsule TAKE 1 CAPSULE BY MOUTH TWICE DAILY 62 capsule 98     levalbuterol (XOPENEX) 1.25 MG/3ML neb solution Take 1 ampule by nebulization 3 times daily And every 4 hours PRN       lisinopril (PRINIVIL/ZESTRIL) 10 MG tablet Take 10 mg by mouth daily       loperamide (IMODIUM) 2 MG capsule Take 2 mg by mouth 4 times daily as needed for diarrhea       magnesium hydroxide (MILK OF MAGNESIA) 400 MG/5ML suspension Take 30 mLs by mouth daily as needed for constipation or heartburn       MAGNESIUM OXIDE PO Take 250 mg by mouth daily       melatonin 3 MG CAPS Take 3 mg by mouth At Bedtime       memantine (NAMENDA) 5 MG tablet TAKE 1 TABLET BY MOUTH ONCE DAILY 31 tablet 98     Menthol, Topical Analgesic, (BIOFREEZE) 4 % GEL Externally apply topically 2 times daily Also Q4H PRN       nystatin (MYCOSTATIN) 070536 UNIT/GM POWD Apply topically 2 times daily as needed        OMEPRAZOLE PO Take 40 mg by mouth every morning       Ondansetron (ZOFRAN ODT PO) Take 4 mg by mouth every 8 hours as needed for nausea       polyethylene glycol (MIRALAX/GLYCOLAX) Packet Take 17 g by mouth daily as needed        polyvinyl alcohol (LIQUIFILM TEARS) 1.4 % ophthalmic solution Place 1 drop into both eyes 2 times daily And 4 times a day PRN       senna-docusate (SENOKOT-S/PERICOLACE) 8.6-50 MG tablet Take 1 tablet by mouth 2 times daily And 1 tab every day PRN       SIMETHICONE-80 PO Take 160 mg by mouth 4 times daily as needed for  "intestinal gas          MEDICATION CHANGES/RATIONALE:   Lisinopril 10mg re-started d/t HTN  Tapered off steroids  Discontinued imdium, miralax, milk of mag, hydrocortisone cream, simethicone, and zofran as not used.   Other medication changes as above.     Controlled medications sent with patient:   Script for Norco medication for 120 tabs and 0 refills given to patient at dischage to have them fill at their out patient pharmacy  Medication: Norco , 4 tabs given to patient at the time of discharge to take home     ROS:    10 point ROS of systems including Constitutional, Eyes, Respiratory, Cardiovascular, Gastroenterology, Genitourinary, Integumentary, Musculoskeletal, Psychiatric were all negative except for pertinent positives noted in my HPI.    Physical Exam:   Vitals: /76   Pulse 67   Temp 97.4  F (36.3  C)   Resp 18   Ht 1.6 m (5' 3\")   Wt 88 kg (194 lb)   SpO2 93%   BMI 34.37 kg/m     BMI= Body mass index is 34.37 kg/m .    GENERAL APPEARANCE:  Alert, in no distress, oriented, morbidly obese, cooperative  RESP:  respiratory effort and palpation of chest normal, lungs clear to auscultation , no respiratory distress, on RA  CV:  Palpation and auscultation of heart done , regular rate and rhythm, no murmur, rub, or gallop, +2 pedal pulses, peripheral edema 1+ in BLE  ABDOMEN:  normal bowel sounds, soft, nontender, no hepatosplenomegaly or other masses, large, round, soft, no guarding or rebound  M/S:   limited ambulation, tenderness to right low back/buttock/hip/thigh, decreased ROM to right hip, no other gross deformities  SKIN:  Inspection of skin and subcutaneous tissue baseline, Palpation of skin and subcutaneous tissue baseline, wound healing well, no signs of infection healin surgical incision to lumbar spine - C/D/I,  NEURO:   Cranial nerves 2-12 are normal tested and grossly at patient's baseline, Examination of sensation by touch normal  PSYCH:  oriented X 3, normal insight, judgement and " memory, affect and mood normal, anxious at times    DISCHARGE PLAN:  Outpatient PT  Patient instructed to follow-up with:  PCP in 1-2 weeks       [facility] scheduled appointments:  No future appointments.    MTM referral needed and placed by this provider: No    Pending labs: None    SNF labs   CBC RESULTS:   Recent Labs   Lab Test 01/28/19  0740 01/21/19  0749  11/23/18  1042 11/14/18  0802   WBC  --   --   --  9.1 7.6   RBC  --   --   --  4.05 3.70*   HGB 10.4* 9.6*   < > 11.1* 9.8*   HCT  --   --   --  36.8 32.9*   MCV  --   --   --  91 89   MCH  --   --   --  27.4 26.5   MCHC  --   --   --  30.2* 29.8*   RDW  --   --   --  17.0* 16.8*   PLT  --   --   --  202 193    < > = values in this interval not displayed.       Last Basic Metabolic Panel:  Recent Labs   Lab Test 01/21/19  0749 01/14/19  0651    142   POTASSIUM 3.9 4.7   CHLORIDE 103 108   BLAIR 9.0 8.9   CO2 33* 30   BUN 36* 24   CR 1.18* 1.03   GLC 90 87       Liver Function Studies -   Recent Labs   Lab Test 06/25/18  1020   ALBUMIN 3.5       TSH   Date Value Ref Range Status   08/14/2017 0.62 0.40 - 4.00 mU/L Final   04/18/2007 0.60 0.4 - 5.0 mU/L Final       Lab Results   Component Value Date    A1C 6.2 08/31/2017        Discharge Treatments:  F/u with neurosurgery as scheduled  Medications as above  Activity as tolerated    Orders:  1. Biofreeze 4 or 5% apply QID to right hip and buttock Dx: Pain    TOTAL DISCHARGE TIME:   Greater than 30 minutes  Electronically signed by:  MARY Gómez CNP      Sincerely,        MARY Gómez CNP

## 2019-01-31 ENCOUNTER — COMMUNICATION - HEALTHEAST (OUTPATIENT)
Dept: RESPIRATORY THERAPY | Facility: HOSPITAL | Age: 84
End: 2019-01-31

## 2019-02-01 ENCOUNTER — TELEPHONE (OUTPATIENT)
Dept: GERIATRICS | Facility: CLINIC | Age: 84
End: 2019-02-01

## 2019-02-01 NOTE — TELEPHONE ENCOUNTER
Prior Authorization Retail Medication Request    Medication/Dose: CYCLOBENZAPRINE 5MG  ICD code (if different than what is on RX):    Previously Tried and Failed:    Rationale:      Insurance Name: [employer] D     Insurance ID: [id number]  Group: [id number]  BIN: [id number]  PCN: [id number]    Pharmacy Information (if different than what is on RX)  Name:  [facility]  Phone:  [phone]

## 2019-02-05 ENCOUNTER — ASSISTED LIVING VISIT (OUTPATIENT)
Dept: GERIATRICS | Facility: CLINIC | Age: 84
End: 2019-02-05
Payer: COMMERCIAL

## 2019-02-05 VITALS
BODY MASS INDEX: 34.37 KG/M2 | SYSTOLIC BLOOD PRESSURE: 156 MMHG | OXYGEN SATURATION: 93 % | DIASTOLIC BLOOD PRESSURE: 61 MMHG | HEART RATE: 70 BPM | RESPIRATION RATE: 20 BRPM | WEIGHT: 194 LBS

## 2019-02-05 DIAGNOSIS — Z98.890 S/P LUMBAR LAMINECTOMY: ICD-10-CM

## 2019-02-05 DIAGNOSIS — M48.062 SPINAL STENOSIS OF LUMBAR REGION WITH NEUROGENIC CLAUDICATION: ICD-10-CM

## 2019-02-05 DIAGNOSIS — K06.8 GINGIVAL ULCER: ICD-10-CM

## 2019-02-05 DIAGNOSIS — M25.551 HIP PAIN, RIGHT: ICD-10-CM

## 2019-02-05 DIAGNOSIS — R19.09 RIGHT GROIN MASS: ICD-10-CM

## 2019-02-05 DIAGNOSIS — Z47.89 AFTERCARE FOLLOWING SURGERY OF THE MUSCULOSKELETAL SYSTEM: ICD-10-CM

## 2019-02-05 DIAGNOSIS — I10 ESSENTIAL HYPERTENSION, BENIGN: Primary | ICD-10-CM

## 2019-02-05 NOTE — LETTER
"    2/5/2019        RE: Jazmin Krishnamurthy [place]  [address]  [facility] [address]        [facility]    Chief Complaint   Patient presents with     correction Acute       [place] Medical Record Number:  [id number]  Place of Service where encounter took place:  [facility] [[id number]]    HPI:    Jazmin Clifton is a 86 year old  (12/30/1932), who is being seen today for an episodic care visit.  HPI information obtained from: facility chart records, facility staff, patient report and [facility] chart review.    Today's concern is:     Essential hypertension, benign  Spinal stenosis of lumbar region with neurogenic claudication  Aftercare following surgery of the musculoskeletal system  S/P lumbar laminectomy  Hip pain, right  Gingival ulcer  Right groin mass   Patient seen in [facility] today as f/u from recent TCU stay s/p lumbar laminectomy. Staff report frequent pain, mostly in right buttock/hip and thigh. She has been prescribed flexeril on discharge, however this had not been sent by pharmacy. Nursing staff at facility spoke with pharmacy today who stated that prior authorization was needed. Reports she is \"aching all over today\" but other than right hip/thigh is not able to pin point a specific location. Does report she had to remove her dentures yesterday and unable to put them in today d/t sores on gum. She is noted to have several small ulcers/blisters to left upper gum. Denies any difficulty with swallowing. No swollen lymph nodes noted.  She continues to have seroma, states size waxes and wanes. She has not been wearing any compression to the area, did order spandex undergarments in the mail but they have not arrived yet.     ALLERGIES: Accupril; Ace inhibitors; Augmentin; Levofloxacin; Morphine; Nitrofurantoin; Norvasc [amlodipine besylate]; and Quinapril  Past Medical, Surgical, Family and Social History reviewed and updated in " [facility].    Current Outpatient Medications   Medication Sig Dispense Refill     acetaminophen (TYLENOL) 325 MG tablet Take 650 mg by mouth every 4 hours as needed for mild pain       Alendronate Sodium (FOSAMAX PO) Take 10 mg by mouth every morning (before breakfast)        Ascorbic Acid (VITAMIN C PO) Take 500 mg by mouth daily       aspirin 81 MG chewable tablet Take 81 mg by mouth       atorvastatin (LIPITOR) 40 MG tablet TAKE 1 TABLET BY MOUTH ONCE DAILY 31 tablet 98     CALCIUM 600+D 600-200 MG-UNIT TABS TAKE 1 TAB BY MOUTH ONCE DAILY 30 tablet 11     calcium carbonate (TUMS) 500 MG chewable tablet Take 1 chew tab by mouth every hour as needed for heartburn       cyclobenzaprine (FLEXERIL) 5 MG tablet Take 5 mg by mouth every 8 hours as needed for muscle spasms       DONEPEZIL HCL PO Take 5 mg by mouth daily       DULoxetine HCl (CYMBALTA PO) Take 30 mg by mouth daily       ferrous sulfate (FEROSUL) 325 (65 Fe) MG tablet Take 325 mg by mouth daily (with breakfast)       fluticasone-vilanterol (BREO ELLIPTA) 100-25 MCG/INH oral inhaler Inhale 1 puff into the lungs daily       Furosemide (LASIX PO) Take 40 mg by mouth daily And 20mg q afternoon.       GABAPENTIN PO Take 100 mg by mouth every morning       GABAPENTIN PO Take 200 mg by mouth At Bedtime        guaiFENesin (ROBITUSSIN) 100 MG/5ML SYRP Take 10 mLs by mouth every 4 hours as needed for cough        HYDROcodone-acetaminophen (NORCO) 5-325 MG tablet Take 1 tablet by mouth 3 times daily       HYDROcodone-acetaminophen (NORCO) 5-325 MG tablet Take 1 tablet by mouth every 6 hours as needed for breakthrough pain        isosorbide mononitrate (IMDUR) 30 MG 24 hr tablet TAKE 1 TABLET BY MOUTH ONCE DAILY 31 tablet 98     isradipine (DYNACIRC) 5 MG capsule TAKE 1 CAPSULE BY MOUTH TWICE DAILY 62 capsule 98     levalbuterol (XOPENEX) 1.25 MG/3ML neb solution Take 1 ampule by nebulization every 4 hours as needed for shortness of breath / dyspnea or wheezing And every 4  hours PRN       lisinopril (PRINIVIL/ZESTRIL) 10 MG tablet Take 20 mg by mouth daily        MAGNESIUM OXIDE PO Take 250 mg by mouth daily       melatonin 3 MG CAPS Take 3 mg by mouth At Bedtime       memantine (NAMENDA) 5 MG tablet TAKE 1 TABLET BY MOUTH ONCE DAILY 31 tablet 98     Menthol, Topical Analgesic, (BIOFREEZE) 4 % GEL Externally apply topically 2 times daily Also Q4H PRN       nystatin (MYCOSTATIN) 479895 UNIT/GM POWD Apply topically 2 times daily as needed        OMEPRAZOLE PO Take 40 mg by mouth every morning       polyvinyl alcohol (LIQUIFILM TEARS) 1.4 % ophthalmic solution Place 1 drop into both eyes 2 times daily And 4 times a day PRN       senna-docusate (SENOKOT-S/PERICOLACE) 8.6-50 MG tablet Take 1 tablet by mouth 2 times daily And 1 tab every day PRN       Medications reviewed:  Medications reconciled to facility chart and changes were made to reflect current medications as identified as above med list. Below are the changes that were made:   Medications stopped since last EPIC medication reconciliation:   There are no discontinued medications.    Medications started since last [facility] medication reconciliation:  No orders of the defined types were placed in this encounter.        REVIEW OF SYSTEMS:  4 point ROS including Respiratory, CV, GI and , other than that noted in the HPI,  is negative    Physical Exam:  /61   Pulse 70   Resp 20   Wt 88 kg (194 lb)   SpO2 93%   BMI 34.37 kg/m     GENERAL APPEARANCE:  Alert, in no distress, morbidly obese, cooperative  RESP:  respiratory effort and palpation of chest normal, lungs clear to auscultation , no respiratory distress  CV:  Palpation and auscultation of heart done , regular rate and rhythm, no murmur, rub, or gallop, +2 pedal pulses, peripheral edema 1+ in bilat ankles  ABDOMEN:  normal bowel sounds, soft, nontender, no hepatosplenomegaly or other masses, no guarding or rebound  M/S:   decreased ROM to right hip, tenderness to low  back, right hip, right thigh; small steps with gait, PETERSEN purposefully  SKIN:  Inspection of skin and subcutaneous tissue baseline, wound healing well, no signs of infection lumbar spine incision C/D/I  NEURO:   Cranial nerves 2-12 are normal tested and grossly at patient's baseline  PSYCH:  oriented X 3, normal insight, judgement and memory, affect and mood normal    Recent Labs:     CBC RESULTS:   Recent Labs   Lab Test 01/28/19  0740 01/21/19  0749  11/23/18  1042 11/14/18  0802   WBC  --   --   --  9.1 7.6   RBC  --   --   --  4.05 3.70*   HGB 10.4* 9.6*   < > 11.1* 9.8*   HCT  --   --   --  36.8 32.9*   MCV  --   --   --  91 89   MCH  --   --   --  27.4 26.5   MCHC  --   --   --  30.2* 29.8*   RDW  --   --   --  17.0* 16.8*   PLT  --   --   --  202 193    < > = values in this interval not displayed.       Last Basic Metabolic Panel:  Recent Labs   Lab Test 01/21/19  0749 01/14/19  0651    142   POTASSIUM 3.9 4.7   CHLORIDE 103 108   BLAIR 9.0 8.9   CO2 33* 30   BUN 36* 24   CR 1.18* 1.03   GLC 90 87       Liver Function Studies -   Recent Labs   Lab Test 06/25/18  1020   ALBUMIN 3.5       TSH   Date Value Ref Range Status   08/14/2017 0.62 0.40 - 4.00 mU/L Final   04/18/2007 0.60 0.4 - 5.0 mU/L Final       Lab Results   Component Value Date    A1C 6.2 08/31/2017         Assessment/Plan:  (I10) Essential hypertension, benign  (primary encounter diagnosis)  Comment: Elevated, frequently >150,s suspect somewhat related to pain, but lisinopril and spironolactone discontinues during TCU stay 11/2018 d/t hyperkalemia. Lisinopril resumed at 10mg 1/2019.   Plan: lisinopril (PRINIVIL/ZESTRIL) 10 MG tablet -increased to 20mg daily. BMP in two weeks to monitor for stability. Continue ASA 81mg daily, atorvastatin 40mg at bedtime, lasix 40mg qAM, 20mg q afternoon, imdur 30mg daily, isradipine 5mg BID. Weight stable. Continue to monitor and adjust    (M48.062) Spinal stenosis of lumbar region with neurogenic  claudication  (Z47.89) Aftercare following surgery of the musculoskeletal system  (Z98.890) S/P lumbar laminectomy  (M25.551) Hip pain, right  Comment: pain likely multifactorial - suspect somewhat related to nerve inflammation from recent back surgery, given tenderness to thigh muscle suspect also have some muscle spasms; deconditioning likely also contributing  Plan: continue outpatient PT, will send prior auth for flexeril, if declined consider trial of tizanidine; continue gabapentin 100mg qAM and 200mg at bedtime. Norco TID and q6h PRN. F/u with neurosurgery Dr. Morris as scheduled on 2/11.     (K06.8) Gingival ulcer  Comment: suspect viral, denies any recent issues with dentures, no recent dental work  Plan: patient to request family bring in orajel or other oral pain relief cream    (R19.09) Right groin mass  Comment: stable, likely will continue to wax and mervat, needs chronic compression but patient has not tolerated spanks in the past. Likely to return quickly if drained given lymp node involved during previous mass removal surgery  Plan: Compression as tolerated, activity as tolerated    Orders:  1. Increase Lisinopril to 20 mg PO in the AM Dx: HTN,CHF  2. BMP and CBC in 2 weeks Dx: Anemia, HTN    Electronically signed by  MARY Gómez CNP      Sincerely,        MARY Gómez CNP

## 2019-02-05 NOTE — PROGRESS NOTES
"[facility] [place] [facility]    Chief Complaint   Patient presents with     prison Acute       [place] Medical Record Number:  [id number]  Place of Service where encounter took place:  [facility] [[id number]]    HPI:    Jazmin Clifton is a 86 year old  (12/30/1932), who is being seen today for an episodic care visit.  HPI information obtained from: facility chart records, facility staff, patient report and [facility] chart review.    Today's concern is:     Essential hypertension, benign  Spinal stenosis of lumbar region with neurogenic claudication  Aftercare following surgery of the musculoskeletal system  S/P lumbar laminectomy  Hip pain, right  Gingival ulcer  Right groin mass   Patient seen in [facility] today as f/u from recent TCU stay s/p lumbar laminectomy. Staff report frequent pain, mostly in right buttock/hip and thigh. She has been prescribed flexeril on discharge, however this had not been sent by pharmacy. Nursing staff at facility spoke with pharmacy today who stated that prior authorization was needed. Reports she is \"aching all over today\" but other than right hip/thigh is not able to pin point a specific location. Does report she had to remove her dentures yesterday and unable to put them in today d/t sores on gum. She is noted to have several small ulcers/blisters to left upper gum. Denies any difficulty with swallowing. No swollen lymph nodes noted.  She continues to have seroma, states size waxes and wanes. She has not been wearing any compression to the area, did order spandex undergarments in the mail but they have not arrived yet.     ALLERGIES: Accupril; Ace inhibitors; Augmentin; Levofloxacin; Morphine; Nitrofurantoin; Norvasc [amlodipine besylate]; and Quinapril  Past Medical, Surgical, Family and Social History reviewed and updated in [facility].    Current Outpatient Medications   Medication Sig Dispense Refill     acetaminophen (TYLENOL) 325 MG tablet Take " 650 mg by mouth every 4 hours as needed for mild pain       Alendronate Sodium (FOSAMAX PO) Take 10 mg by mouth every morning (before breakfast)        Ascorbic Acid (VITAMIN C PO) Take 500 mg by mouth daily       aspirin 81 MG chewable tablet Take 81 mg by mouth       atorvastatin (LIPITOR) 40 MG tablet TAKE 1 TABLET BY MOUTH ONCE DAILY 31 tablet 98     CALCIUM 600+D 600-200 MG-UNIT TABS TAKE 1 TAB BY MOUTH ONCE DAILY 30 tablet 11     calcium carbonate (TUMS) 500 MG chewable tablet Take 1 chew tab by mouth every hour as needed for heartburn       cyclobenzaprine (FLEXERIL) 5 MG tablet Take 5 mg by mouth every 8 hours as needed for muscle spasms       DONEPEZIL HCL PO Take 5 mg by mouth daily       DULoxetine HCl (CYMBALTA PO) Take 30 mg by mouth daily       ferrous sulfate (FEROSUL) 325 (65 Fe) MG tablet Take 325 mg by mouth daily (with breakfast)       fluticasone-vilanterol (BREO ELLIPTA) 100-25 MCG/INH oral inhaler Inhale 1 puff into the lungs daily       Furosemide (LASIX PO) Take 40 mg by mouth daily And 20mg q afternoon.       GABAPENTIN PO Take 100 mg by mouth every morning       GABAPENTIN PO Take 200 mg by mouth At Bedtime        guaiFENesin (ROBITUSSIN) 100 MG/5ML SYRP Take 10 mLs by mouth every 4 hours as needed for cough        HYDROcodone-acetaminophen (NORCO) 5-325 MG tablet Take 1 tablet by mouth 3 times daily       HYDROcodone-acetaminophen (NORCO) 5-325 MG tablet Take 1 tablet by mouth every 6 hours as needed for breakthrough pain        isosorbide mononitrate (IMDUR) 30 MG 24 hr tablet TAKE 1 TABLET BY MOUTH ONCE DAILY 31 tablet 98     isradipine (DYNACIRC) 5 MG capsule TAKE 1 CAPSULE BY MOUTH TWICE DAILY 62 capsule 98     levalbuterol (XOPENEX) 1.25 MG/3ML neb solution Take 1 ampule by nebulization every 4 hours as needed for shortness of breath / dyspnea or wheezing And every 4 hours PRN       lisinopril (PRINIVIL/ZESTRIL) 10 MG tablet Take 20 mg by mouth daily        MAGNESIUM OXIDE PO Take 250  mg by mouth daily       melatonin 3 MG CAPS Take 3 mg by mouth At Bedtime       memantine (NAMENDA) 5 MG tablet TAKE 1 TABLET BY MOUTH ONCE DAILY 31 tablet 98     Menthol, Topical Analgesic, (BIOFREEZE) 4 % GEL Externally apply topically 2 times daily Also Q4H PRN       nystatin (MYCOSTATIN) 952376 UNIT/GM POWD Apply topically 2 times daily as needed        OMEPRAZOLE PO Take 40 mg by mouth every morning       polyvinyl alcohol (LIQUIFILM TEARS) 1.4 % ophthalmic solution Place 1 drop into both eyes 2 times daily And 4 times a day PRN       senna-docusate (SENOKOT-S/PERICOLACE) 8.6-50 MG tablet Take 1 tablet by mouth 2 times daily And 1 tab every day PRN       Medications reviewed:  Medications reconciled to facility chart and changes were made to reflect current medications as identified as above med list. Below are the changes that were made:   Medications stopped since last EPIC medication reconciliation:   There are no discontinued medications.    Medications started since last [facility] medication reconciliation:  No orders of the defined types were placed in this encounter.        REVIEW OF SYSTEMS:  4 point ROS including Respiratory, CV, GI and , other than that noted in the HPI,  is negative    Physical Exam:  /61   Pulse 70   Resp 20   Wt 88 kg (194 lb)   SpO2 93%   BMI 34.37 kg/m    GENERAL APPEARANCE:  Alert, in no distress, morbidly obese, cooperative  RESP:  respiratory effort and palpation of chest normal, lungs clear to auscultation , no respiratory distress  CV:  Palpation and auscultation of heart done , regular rate and rhythm, no murmur, rub, or gallop, +2 pedal pulses, peripheral edema 1+ in bilat ankles  ABDOMEN:  normal bowel sounds, soft, nontender, no hepatosplenomegaly or other masses, no guarding or rebound  M/S:   decreased ROM to right hip, tenderness to low back, right hip, right thigh; small steps with gait, PETERSEN purposefully  SKIN:  Inspection of skin and subcutaneous tissue  baseline, wound healing well, no signs of infection lumbar spine incision C/D/I  NEURO:   Cranial nerves 2-12 are normal tested and grossly at patient's baseline  PSYCH:  oriented X 3, normal insight, judgement and memory, affect and mood normal    Recent Labs:     CBC RESULTS:   Recent Labs   Lab Test 01/28/19  0740 01/21/19  0749  11/23/18  1042 11/14/18  0802   WBC  --   --   --  9.1 7.6   RBC  --   --   --  4.05 3.70*   HGB 10.4* 9.6*   < > 11.1* 9.8*   HCT  --   --   --  36.8 32.9*   MCV  --   --   --  91 89   MCH  --   --   --  27.4 26.5   MCHC  --   --   --  30.2* 29.8*   RDW  --   --   --  17.0* 16.8*   PLT  --   --   --  202 193    < > = values in this interval not displayed.       Last Basic Metabolic Panel:  Recent Labs   Lab Test 01/21/19  0749 01/14/19  0651    142   POTASSIUM 3.9 4.7   CHLORIDE 103 108   BLAIR 9.0 8.9   CO2 33* 30   BUN 36* 24   CR 1.18* 1.03   GLC 90 87       Liver Function Studies -   Recent Labs   Lab Test 06/25/18  1020   ALBUMIN 3.5       TSH   Date Value Ref Range Status   08/14/2017 0.62 0.40 - 4.00 mU/L Final   04/18/2007 0.60 0.4 - 5.0 mU/L Final       Lab Results   Component Value Date    A1C 6.2 08/31/2017         Assessment/Plan:  (I10) Essential hypertension, benign  (primary encounter diagnosis)  Comment: Elevated, frequently >150,s suspect somewhat related to pain, but lisinopril and spironolactone discontinues during TCU stay 11/2018 d/t hyperkalemia. Lisinopril resumed at 10mg 1/2019.   Plan: lisinopril (PRINIVIL/ZESTRIL) 10 MG tablet -increased to 20mg daily. BMP in two weeks to monitor for stability. Continue ASA 81mg daily, atorvastatin 40mg at bedtime, lasix 40mg qAM, 20mg q afternoon, imdur 30mg daily, isradipine 5mg BID. Weight stable. Continue to monitor and adjust    (M48.062) Spinal stenosis of lumbar region with neurogenic claudication  (Z47.89) Aftercare following surgery of the musculoskeletal system  (Z98.890) S/P lumbar laminectomy  (M25.551) Hip  pain, right  Comment: pain likely multifactorial - suspect somewhat related to nerve inflammation from recent back surgery, given tenderness to thigh muscle suspect also have some muscle spasms; deconditioning likely also contributing  Plan: continue outpatient PT, will send prior auth for flexeril, if declined consider trial of tizanidine; continue gabapentin 100mg qAM and 200mg at bedtime. Norco TID and q6h PRN. F/u with neurosurgery Dr. Morris as scheduled on 2/11.     (K06.8) Gingival ulcer  Comment: suspect viral, denies any recent issues with dentures, no recent dental work  Plan: patient to request family bring in orajel or other oral pain relief cream    (R19.09) Right groin mass  Comment: stable, likely will continue to wax and mervat, needs chronic compression but patient has not tolerated spanks in the past. Likely to return quickly if drained given lymp node involved during previous mass removal surgery  Plan: Compression as tolerated, activity as tolerated    Orders:  1. Increase Lisinopril to 20 mg PO in the AM Dx: HTN,CHF  2. BMP and CBC in 2 weeks Dx: Anemia, HTN    Electronically signed by  MARY Gómez CNP

## 2019-02-06 RX ORDER — LISINOPRIL 10 MG/1
20 TABLET ORAL DAILY
Qty: 30 TABLET | Refills: 3 | Status: SHIPPED | OUTPATIENT
Start: 2019-02-06 | End: 2019-04-24 | Stop reason: DRUGHIGH

## 2019-02-08 RX ORDER — TIZANIDINE 2 MG/1
2 TABLET ORAL 3 TIMES DAILY PRN
Qty: 270 TABLET | Refills: 0 | Status: SHIPPED | OUTPATIENT
Start: 2019-02-08 | End: 2019-02-25

## 2019-02-09 ENCOUNTER — HOSPITAL ENCOUNTER (EMERGENCY)
Facility: CLINIC | Age: 84
Discharge: HOME | End: 2019-02-09
Attending: FAMILY MEDICINE | Admitting: FAMILY MEDICINE
Payer: COMMERCIAL

## 2019-02-09 VITALS
DIASTOLIC BLOOD PRESSURE: 67 MMHG | RESPIRATION RATE: 20 BRPM | OXYGEN SATURATION: 92 % | WEIGHT: 200 LBS | BODY MASS INDEX: 35.43 KG/M2 | SYSTOLIC BLOOD PRESSURE: 147 MMHG | HEART RATE: 69 BPM | TEMPERATURE: 97.5 F

## 2019-02-09 DIAGNOSIS — A49.9 UTI (URINARY TRACT INFECTION), BACTERIAL: ICD-10-CM

## 2019-02-09 DIAGNOSIS — S30.1XXA ABDOMINAL WALL SEROMA, INITIAL ENCOUNTER: ICD-10-CM

## 2019-02-09 DIAGNOSIS — N39.0 UTI (URINARY TRACT INFECTION), BACTERIAL: ICD-10-CM

## 2019-02-09 LAB
ALBUMIN SERPL-MCNC: 3.1 G/DL (ref 3.4–5)
ALBUMIN UR-MCNC: NEGATIVE MG/DL
ALP SERPL-CCNC: 79 U/L (ref 40–150)
ALT SERPL W P-5'-P-CCNC: 14 U/L (ref 0–50)
ANION GAP SERPL CALCULATED.3IONS-SCNC: 5 MMOL/L (ref 3–14)
APPEARANCE UR: ABNORMAL
AST SERPL W P-5'-P-CCNC: 14 U/L (ref 0–45)
BACTERIA #/AREA URNS HPF: ABNORMAL /HPF
BASOPHILS # BLD AUTO: 0 10E9/L (ref 0–0.2)
BASOPHILS NFR BLD AUTO: 0.5 %
BILIRUB SERPL-MCNC: 0.3 MG/DL (ref 0.2–1.3)
BILIRUB UR QL STRIP: NEGATIVE
BUN SERPL-MCNC: 30 MG/DL (ref 7–30)
CALCIUM SERPL-MCNC: 8.4 MG/DL (ref 8.5–10.1)
CHLORIDE SERPL-SCNC: 107 MMOL/L (ref 94–109)
CO2 SERPL-SCNC: 30 MMOL/L (ref 20–32)
COLOR UR AUTO: YELLOW
CREAT SERPL-MCNC: 1.38 MG/DL (ref 0.52–1.04)
DIFFERENTIAL METHOD BLD: ABNORMAL
EOSINOPHIL # BLD AUTO: 0.2 10E9/L (ref 0–0.7)
EOSINOPHIL NFR BLD AUTO: 2.6 %
ERYTHROCYTE [DISTWIDTH] IN BLOOD BY AUTOMATED COUNT: 18.9 % (ref 10–15)
GFR SERPL CREATININE-BSD FRML MDRD: 35 ML/MIN/{1.73_M2}
GLUCOSE SERPL-MCNC: 130 MG/DL (ref 70–99)
GLUCOSE UR STRIP-MCNC: NEGATIVE MG/DL
HCT VFR BLD AUTO: 34.6 % (ref 35–47)
HGB BLD-MCNC: 10 G/DL (ref 11.7–15.7)
HGB UR QL STRIP: NEGATIVE
HYALINE CASTS #/AREA URNS LPF: 10 /LPF (ref 0–2)
IMM GRANULOCYTES # BLD: 0 10E9/L (ref 0–0.4)
IMM GRANULOCYTES NFR BLD: 0.2 %
KETONES UR STRIP-MCNC: NEGATIVE MG/DL
LEUKOCYTE ESTERASE UR QL STRIP: ABNORMAL
LYMPHOCYTES # BLD AUTO: 1.1 10E9/L (ref 0.8–5.3)
LYMPHOCYTES NFR BLD AUTO: 18.2 %
MCH RBC QN AUTO: 26.7 PG (ref 26.5–33)
MCHC RBC AUTO-ENTMCNC: 28.9 G/DL (ref 31.5–36.5)
MCV RBC AUTO: 92 FL (ref 78–100)
MONOCYTES # BLD AUTO: 0.4 10E9/L (ref 0–1.3)
MONOCYTES NFR BLD AUTO: 7.1 %
MUCOUS THREADS #/AREA URNS LPF: PRESENT /LPF
NEUTROPHILS # BLD AUTO: 4.2 10E9/L (ref 1.6–8.3)
NEUTROPHILS NFR BLD AUTO: 71.4 %
NITRATE UR QL: NEGATIVE
NRBC # BLD AUTO: 0 10*3/UL
NRBC BLD AUTO-RTO: 0 /100
PH UR STRIP: 5 PH (ref 5–7)
PLATELET # BLD AUTO: 190 10E9/L (ref 150–450)
POTASSIUM SERPL-SCNC: 4 MMOL/L (ref 3.4–5.3)
PROT SERPL-MCNC: 6.1 G/DL (ref 6.8–8.8)
RBC # BLD AUTO: 3.75 10E12/L (ref 3.8–5.2)
RBC #/AREA URNS AUTO: 3 /HPF (ref 0–2)
SODIUM SERPL-SCNC: 142 MMOL/L (ref 133–144)
SOURCE: ABNORMAL
SP GR UR STRIP: 1.01 (ref 1–1.03)
SQUAMOUS #/AREA URNS AUTO: 6 /HPF (ref 0–1)
UROBILINOGEN UR STRIP-MCNC: 0 MG/DL (ref 0–2)
WBC # BLD AUTO: 5.9 10E9/L (ref 4–11)
WBC #/AREA URNS AUTO: 61 /HPF (ref 0–5)

## 2019-02-09 PROCEDURE — 80053 COMPREHEN METABOLIC PANEL: CPT | Performed by: FAMILY MEDICINE

## 2019-02-09 PROCEDURE — 87086 URINE CULTURE/COLONY COUNT: CPT | Performed by: FAMILY MEDICINE

## 2019-02-09 PROCEDURE — 85025 COMPLETE CBC W/AUTO DIFF WBC: CPT | Performed by: FAMILY MEDICINE

## 2019-02-09 PROCEDURE — 81001 URINALYSIS AUTO W/SCOPE: CPT | Performed by: FAMILY MEDICINE

## 2019-02-09 PROCEDURE — 99284 EMERGENCY DEPT VISIT MOD MDM: CPT | Mod: Z6 | Performed by: FAMILY MEDICINE

## 2019-02-09 PROCEDURE — 87088 URINE BACTERIA CULTURE: CPT | Performed by: FAMILY MEDICINE

## 2019-02-09 PROCEDURE — 87186 SC STD MICRODIL/AGAR DIL: CPT | Performed by: FAMILY MEDICINE

## 2019-02-09 PROCEDURE — 99284 EMERGENCY DEPT VISIT MOD MDM: CPT

## 2019-02-09 RX ORDER — CIPROFLOXACIN 500 MG/1
500 TABLET, FILM COATED ORAL 2 TIMES DAILY
Qty: 20 TABLET | Refills: 0 | Status: SHIPPED | OUTPATIENT
Start: 2019-02-09 | End: 2019-02-18

## 2019-02-09 NOTE — ED AVS SNAPSHOT
[facility] Emergency Department  [address] [address]  Phone:  [phone]  Fax:  [phone]                                    Jazmin Clifton   MRN: [id number]    Department:  [facility] Emergency Department   Date of Visit:  2/9/2019           After Visit Summary Signature Page    I have received my discharge instructions, and my questions have been answered. I have discussed any challenges I see with this plan with the nurse or doctor.    ..........................................................................................................................................  Patient/Patient Representative Signature      ..........................................................................................................................................  Patient Representative Print Name and Relationship to Patient    ..................................................               ................................................  Date                                   Time    ..........................................................................................................................................  Reviewed by Signature/Title    ...................................................              ..............................................  Date                                               Time          22EPIC Rev 08/18

## 2019-02-09 NOTE — DISCHARGE INSTRUCTIONS
SPANKS compressive garment as directed previously.  Outpatient consultation with general surgery for discussion of potential surgical repair.  Cipro as directed for urinary tract infection.  Return to the emergency department if worse or changes.

## 2019-02-09 NOTE — ED PROVIDER NOTES
History     Chief Complaint   Patient presents with     Groin Pain     right groin pain and swelling, had growth removed a year ago that affected the lymph node   Swelling/growth in the right groin; pain.  HPI  Jazmin Clifton is a 86 year old female, medical history is significant for morbid obesity, mild cognitive impairment, status post carotid endarterectomy, thyroid nodule, trochanteric bursitis, stage III kidney disease, TIA, carotid artery stenosis, COPD, generalized weakness, dizziness, hypotension, osteoarthritis, depression, urinary incontinence, cerebral aneurysm, GERD, hypertension, presents to the emergency department with concerns of swelling and discomfort in her right groin that has been present for approximately a year by the patient's account since the time of a surgery in her right groin area.  This has been evaluated on numerous occasions in the past through the patient's regular caregivers and is felt to be a seroma.  She was advised to use a compressive garment (SPANKS) with which she has been noncompliant for at least the last month and a half.  Does not like them because they are very compressive and make her uncomfortable and she feels that they affect her breathing.  She feels that the seroma has gotten larger and more uncomfortable and or interferes with her walking.  This has been evaluated in the past by the surgeon who performed her original procedure and she was advised that it should not be drained as it would likely recur, it was apparently advised by surgery that she have a compressive garment only.  She denies nausea or vomiting, no fever chills or sweats.  She has not noticed any redness or rash in the area.  She presents today brought in by her son from her nursing home because she is frustrated and unhappy with the current treatment plan.  She has been managing her pain with as needed Norco.      Allergies:  Allergies   Allergen Reactions     Accupril      Ace Inhibitors  Unknown     Accupril     Augmentin Unknown     Levofloxacin Unknown, Muscle Pain (Myalgia) and Other (See Comments)     Comment: myalgias, Description:   Pt prescribed ciprofloxacin in 2018 (no rxn documented)  Myalgias       Morphine Other (See Comments)     hallucinations     Nitrofurantoin Nausea and Vomiting and Unknown     Norvasc [Amlodipine Besylate]      Leg swelling       Quinapril Other (See Comments) and Unknown     Hypertension. 3.29.18 - Takes and tolerates lisinopril  Patient reports HTN with as reaction to this medication         Problem List:    Patient Active Problem List    Diagnosis Date Noted     Health Care Home 2019     Priority: Medium     [facility] Care Coordinator  Mirian Weldon MA, LSW  [phone]    [facility] CARE PLAN SUMMARY    Member Name:  Jazmin Clifton  Address:  [address] [address] Phone: [phone] (home)    :  1932 Date of Assessment:     Health Plan:  [employer]  Health Plan Number: [id number] Medical Assistance Number: [id number]  Financial Worker:    Case #:     [facility] Care Coordinator:  Mirian Weldon MA, LSW CC Phone:  [phone]  CC Fax:  [phone]   FVP Enrollment Date: 19 Case Mix:    Rate Cell:    Waiver Type:     Emergency Contact:  Extended Emergency Contact Information  Primary Emergency Contact: Chi Clifton  Address: [address] [place] [address] [facility]  Mobile Phone: [phone]  Relation: Son  Secondary Emergency Contact: Lucius Clifton  Address: [address] [address]  Home Phone: [phone]  Work Phone: [phone]  Relation: Son Language:  English  :  No   Health Care Agent/POA:   Advanced Directives/Living Will:     Primary Care Clinic/Phone/Fax:  [facility]/(p) [phone], (f) [phone] Primary Dx:    Secondary Dx:     Primary Physician:  Junie Estrella    "Height:  5' 3\"  Weight:  194 lbs   Specialty Physician:    Audiologist:     Eye Care Provider:   Dental Care Provider:    [facility]: [facility] [phone]   Other:             Morbid obesity (H) 06/19/2018     Priority: Medium     Mild cognitive impairment 09/22/2017     Priority: Medium     S/P carotid endarterectomy 09/06/2017     Priority: Medium     Carotid stenosis, symptomatic w/o infarct, right 08/31/2017     Priority: Medium     Thyroid nodule 08/23/2017     Priority: Medium     Trigger point of extremity 08/23/2017     Priority: Medium     Trochanteric bursitis of right hip 08/23/2017     Priority: Medium     Urinary tract infection without hematuria, site unspecified 08/23/2017     Priority: Medium     CKD (chronic kidney disease) stage 3, GFR 30-59 ml/min (H) 08/16/2017     Priority: Medium     ACP (advance care planning) 08/03/2017     Priority: Medium     8/3/2017  Recommend Code Status in chart and patient's Advance Care Planning documents be reviewed to ensure alignment with patient's current wishes.  Most recent Advance Care Planning document is a POLST dated 7/24/17 indicating DNI.  Agnieszka Santillan,  Advance Care Planning Liaison           Transient cerebral ischemia, unspecified type 08/01/2017     Priority: Medium     Carotid artery stenosis, symptomatic, right 08/01/2017     Priority: Medium     CVA (cerebral vascular accident) (H) 07/26/2017     Priority: Medium     Chronic obstructive pulmonary disease, unspecified COPD type (H) 07/25/2017     Priority: Medium     Generalized muscle weakness 07/25/2017     Priority: Medium     Dizziness 07/25/2017     Priority: Medium     Osteoarthritis of right hip, unspecified osteoarthritis type 07/25/2017     Priority: Medium     Hypotension, unspecified hypotension type 07/25/2017     Priority: Medium     Hip joint replacement status 04/18/2012     Priority: Medium     Osteoarthritis 04/18/2012     Priority: Medium     DDD (degenerative disc disease), " lumbar 04/18/2012     Priority: Medium     Mild major depression (H) 04/18/2012     Priority: Medium     Incontinence of urine 04/18/2012     Priority: Medium     Osteoporosis 10/25/2011     Priority: Medium     S/P laminectomy 10/25/2011     Priority: Medium     CARDIOVASCULAR SCREENING; LDL GOAL LESS THAN 100 10/31/2010     Priority: Medium     CHF (congestive heart failure) (H) 09/17/2008     Priority: Medium     Diastolic disfunction - ECHP 2008       Restrictive lung disease 09/17/2008     Priority: Medium     PFT 4/2008       Renovascular hypertension 11/09/2006     Priority: Medium     Problem list name updated by automated process. Provider to review       Benign neoplasm of colon 10/04/2006     Priority: Medium     Angiodysplasia - due for repeat 10 years.  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 NO edema. Continue current Therapy.HYPERTENSION: Dynacirc CR 10mg daily initiated today. Will continue adjusting blood pressure medicatins as needed until readings at target.VITAMIN D  DEFICIENCY - Completed therapy, discontinue ergocalciferol.ANEMIA, EPO DEFICIENCY - Hgb 10.2. Not on EPO. Continue observation for now. Recnet Hgb drop due to lumbar laminectomy.  Problem list name updated by automated process. Provider to review       Essential hypertension, benign 05/01/2006     Priority: Medium     Atherosclerosis of renal artery (H)      Priority: Medium     Left renal artery stenosis       Esophageal reflux      Priority: Medium     Gastroesophageal Reflux Disease       Cerebral aneurysm, nonruptured      Priority: Medium     Cerebral  Aneurysm - unchanged on rcent MRI.  Seen by neurosurg.  Sullivan she should have follow up MRA every 2 years (due 2010)       Other specified cardiac dysrhythmias(427.89)      Priority: Medium     Bradycardia, improved on lower dose beta blockers           Past Medical History:    Past Medical History:   Diagnosis Date     ABDOMINAL PAIN GENERALIZED 3/15/2006     Abdominal pain, generalized 3/15/2006     Atherosclerosis of renal artery (H)      BENIGN HYPERTENSION 5/1/2006     Benign neoplasm of scalp and skin of neck      Cerebral aneurysm, nonruptured      Depressive disorder, not elsewhere classified      Esophageal reflux      Female stress incontinence      Gastrointestinal malfunction arising from mental factors      Generalized osteoarthrosis, unspecified site      Herpes zoster dermatitis of eyelid      Insomnia, unspecified      Lumbago      Other chest pain      Other specified cardiac dysrhythmias(427.89)      Rectocele      Unspecified disorder of kidney and ureter      Unspecified essential hypertension        Past Surgical History:    Past Surgical History:   Procedure Laterality Date     CHOLECYSTECTOMY, LAPOROSCOPIC  1997    Cholecystectomy, Laparoscopic     ENDARTERECTOMY CAROTID Right 8/31/2017    Procedure: ENDARTERECTOMY CAROTID;  RIGHT CAROTID ENDARTERECTOMY WITH EEG;  Surgeon: Hilario Parry MD;  Location: [facility] OR     HYSTERECTOMY, RAYMOND  1982    oophorectomy,RAYMOND     SURGICAL HISTORY OF -       Laminectomy x 3     SURGICAL HISTORY OF -       MCA Aneurysm repair     SURGICAL HISTORY OF -   1996    Bladder suspension (Bladder Repair x2)     SURGICAL HISTORY OF -       Bilateral Hand Surgeries for Arthritis x2     SURGICAL HISTORY OF -       Repair of a Cerebral Aneurysm       Family History:    Family History   Problem Relation Age of Onset     Cancer Mother         stomache     Cerebrovascular Disease Father      Heart Disease Father      Cancer Brother         lymphoma     Eye Disorder Son       Asthma Son      Diabetes Son      Gastrointestinal Disease Son         no control over bladder or bowels     C.A.D. No family hx of      Hypertension No family hx of      Breast Cancer No family hx of      Cancer - colorectal No family hx of      Prostate Cancer No family hx of        Social History:  Marital Status:   [5]  Social History     Tobacco Use     Smoking status: Former Smoker     Last attempt to quit: 1992     Years since quittin.1   Substance Use Topics     Alcohol use: Yes     Comment: wine occas.     Drug use: No        Medications:      ciprofloxacin (CIPRO) 500 MG tablet   acetaminophen (TYLENOL) 325 MG tablet   Alendronate Sodium (FOSAMAX PO)   Ascorbic Acid (VITAMIN C PO)   aspirin 81 MG chewable tablet   atorvastatin (LIPITOR) 40 MG tablet   CALCIUM 600+D 600-200 MG-UNIT TABS   calcium carbonate (TUMS) 500 MG chewable tablet   cyclobenzaprine (FLEXERIL) 5 MG tablet   DONEPEZIL HCL PO   DULoxetine HCl (CYMBALTA PO)   ferrous sulfate (FEROSUL) 325 (65 Fe) MG tablet   fluticasone-vilanterol (BREO ELLIPTA) 100-25 MCG/INH oral inhaler   Furosemide (LASIX PO)   GABAPENTIN PO   GABAPENTIN PO   guaiFENesin (ROBITUSSIN) 100 MG/5ML SYRP   HYDROcodone-acetaminophen (NORCO) 5-325 MG tablet   HYDROcodone-acetaminophen (NORCO) 5-325 MG tablet   isosorbide mononitrate (IMDUR) 30 MG 24 hr tablet   isradipine (DYNACIRC) 5 MG capsule   levalbuterol (XOPENEX) 1.25 MG/3ML neb solution   lisinopril (PRINIVIL/ZESTRIL) 10 MG tablet   MAGNESIUM OXIDE PO   melatonin 3 MG CAPS   memantine (NAMENDA) 5 MG tablet   Menthol, Topical Analgesic, (BIOFREEZE) 4 % GEL   nystatin (MYCOSTATIN) 076977 UNIT/GM POWD   OMEPRAZOLE PO   polyvinyl alcohol (LIQUIFILM TEARS) 1.4 % ophthalmic solution   senna-docusate (SENOKOT-S/PERICOLACE) 8.6-50 MG tablet   tiZANidine (ZANAFLEX) 2 MG tablet         Review of Systems   All other systems reviewed and are negative.      Physical Exam   BP: 128/68  Pulse: 71  Heart Rate:  77  Temp: 97.5  F (36.4  C)  Resp: 20  Weight: 90.7 kg (200 lb)  SpO2: 95 %      Physical Exam   Constitutional: She appears well-developed and well-nourished.   Alert, no acute distress, does not appear ill or toxic.   HENT:   Head: Normocephalic and atraumatic.   Right Ear: External ear normal.   Left Ear: External ear normal.   Nose: Nose normal.   Mouth/Throat: Oropharynx is clear and moist.   Eyes: EOM are normal. Pupils are equal, round, and reactive to light.   Neck: Normal range of motion. Neck supple.   Cardiovascular: Normal rate, regular rhythm, normal heart sounds and intact distal pulses.   Pulmonary/Chest: Effort normal and breath sounds normal.   Abdominal: Soft. Bowel sounds are normal.   There is a approximately 10 cm x 5 cm swelling in the right groin, soft, fluctuant, mildly tender with no overlying erythema.  There is no palpable hernia around this.  This appears consistent with a seroma as previously diagnosed.   Musculoskeletal: Normal range of motion.   Neurological: She is alert.   Nursing note and vitals reviewed.      ED Course        Procedures  Labs Ordered and Resulted from Time of ED Arrival Up to the Time of Departure from the ED   CBC WITH PLATELETS DIFFERENTIAL - Abnormal; Notable for the following components:       Result Value    RBC Count 3.75 (*)     Hemoglobin 10.0 (*)     Hematocrit 34.6 (*)     MCHC 28.9 (*)     RDW 18.9 (*)     All other components within normal limits   COMPREHENSIVE METABOLIC PANEL - Abnormal; Notable for the following components:    Glucose 130 (*)     Creatinine 1.38 (*)     GFR Estimate 35 (*)     GFR Estimate If Black 40 (*)     Calcium 8.4 (*)     Albumin 3.1 (*)     Protein Total 6.1 (*)     All other components within normal limits   URINE MACROSCOPIC WITH REFLEX TO MICRO - Abnormal; Notable for the following components:    Leukocyte Esterase Urine Moderate (*)     RBC Urine 3 (*)     WBC Urine 61 (*)     Bacteria Urine Moderate (*)     Squamous  Epithelial /HPF Urine 6 (*)     Mucous Urine Present (*)     Hyaline Casts 10 (*)     All other components within normal limits                  Critical Care time:  none               No results found for this or any previous visit (from the past 24 hour(s)).    Medications - No data to display    Assessments & Plan (with Medical Decision Making)   86-year-old female past medical history reviewed as above who presents for concerns of swelling in her right groin which has been present for approximately 1 year by the patient's account.  I reviewed this in her chart as well as examining her.  Her findings are consistent with an gradually enlarging seroma by history and exam.  It has been recommended to her that she wear a compressive garment which she is noncompliant with.  She had no acute abdominal findings today.  Her urine does show some evidence of infection although unfortunately does have some skin cell contamination.  This is certainly not the cause for her pain however given her age we will treat this empirically and culture it.  At her request I did place a consult for general surgery encouraged her to follow-up for discussion of options.      Disclaimer: This note consists of symbols derived from keyboarding, dictation and/or voice recognition software. As a result, there may be errors in the script that have gone undetected. Please consider this when interpreting information found in this chart.      I have reviewed the nursing notes.    I have reviewed the findings, diagnosis, plan and need for follow up with the patient.             Medication List      Started    ciprofloxacin 500 MG tablet  Commonly known as:  CIPRO  500 mg, Oral, 2 TIMES DAILY            Final diagnoses:   UTI (urinary tract infection), bacterial   Abdominal wall seroma, initial encounter (H) - Right inguinal       2/9/2019   [facility] EMERGENCY DEPARTMENT     Kenton Lou MD  02/13/19 [id number]

## 2019-02-09 NOTE — ED TRIAGE NOTES
Had growth removed from right groin a year ago, ongoing problems with pain and swelling that make it hard for her to do ADLs. Has been seen for and it was felt if it is drained it will only reaccumulate

## 2019-02-10 LAB
BACTERIA SPEC CULT: ABNORMAL
Lab: ABNORMAL
SPECIMEN SOURCE: ABNORMAL

## 2019-02-11 NOTE — RESULT ENCOUNTER NOTE
Final Urine Culture Report on 2/10/19  Emergency Dept/Urgent Care discharge antibiotic prescribed: Ciprofloxacin (Cipro) 500 mg tablet, 1 tablet (500 mg) by mouth 2 times daily for 10 days.  #1. Bacteria, >100,000 colonies/mL Escherichia coli, is SUSCEPTIBLE to Antibiotic.    As per [facility] ED Lab Result protocol, no change in antibiotic therapy.

## 2019-02-13 ENCOUNTER — PATIENT OUTREACH (OUTPATIENT)
Dept: GERIATRIC MEDICINE | Facility: CLINIC | Age: 84
End: 2019-02-13

## 2019-02-13 PROBLEM — Z76.89 HEALTH CARE HOME: Status: ACTIVE | Noted: 2019-02-13

## 2019-02-13 NOTE — LETTER
February 13, 2019    Important Plan Information    [facility] [address] [address]  [address]  [facility] [place] [address]    Your New Care Coordinator  Dear Vickie,  My name is Mirian Weldon MA, LIV and I am your new Care Coordinator. You may reach me by calling [phone]. I will be in touch with you shortly to address any questions you may have.   I have also been in contact with Kala Grant, your previous care coordinator, to ensure a smooth transition.  Questions?  Call me at [phone] Monday-Friday between 8am and 5pm. TTY/TDD: 711. I look forward to working with you as a [employer]  member.  Sincerely,    Mirian Weldon MA, LSW    E-mail: [email or web address]  Phone: [phone]      [place] [employer]        cc: member records            American Indians can continue to use [facility] and [facility] ([facility]) clinics. We will not require prior approval or impose any conditions for you to get services at these clinics. For elders age 65 years and older this includes Elderly Waiver (EW) services accessed through the [facility]. If a doctor or other provider in a [facility] or [facility] clinic refers you to a provider in our network, we will not require you to see your primary care provider prior to the referral.    For accessible formats of this publication or assistance with equal or access to our services, visit [email or web address], or call [phone] (toll free) or use your preferred relay service.    Auxiliary Aids and Services.   [employer] provides auxiliary aids and services, like qualified interpreters or information in accessible formats, free of charge and in a timely manner, to ensure an equal opportunity to participate in our health care programs. Contact [employer] Customer Service at [email or web address] or call [phone] (toll free) or use your preferred relay service.    Language Assistance Services.   [employer] provides translated documents and spoken language  interpreting, free of charge and in a timely manner, when language assistance services are necessary to ensure limited English speakers have meaningful access to our information and services. Contact [employer] Customer Service at [email or web address] or call [phone] (toll free) or use your preferred relay service.     Civil Rights Notice  Discrimination is against the law. [employer] does not discriminate on the basis of any of the following:    Race    Color    National Origin    Creed    Taoism    Age    Public Assistance Status    Receipt of Health Care Services    Disability (including physical or mental impairment)    Sex (including sex stereotypes and gender identity)    Marital Status    Political Beliefs    Medical Condition    Genetic Information    Sexual Orientation    Claims Experience    Medical History    Health Status    Civil Rights Complaints.   You have the right to file a discrimination complaint if you believe you were treated in a discriminatory way by [employer]. You may contact any of the following four agencies directly to file a discrimination complaint.    U.S. Department of Health and Human Services  Office for Civil Rights (OCR)  You have the right to file a complaint with the OCR, a federal agency, if you believe you have been discriminated against because of any of the following:    Race    Disability    Color    Sex (including sex stereotypes and gender identity)    National Origin    Age    Contact the OCR directly to file a complaint:         Director         U.S. Department of Health and Human Services  Office for Civil Rights         [address] [place] [address]         [phone] (Voice)         [phone] (TDD)         Complaint Portal - https://ocrportal.hhs.gov/ocr/portal/lobby.jsf     [place] Department of Human Rights (MDHR)  In [place], you have the right to file a complaint with the [facility] if you  believe you have been discriminated against because of any of the following:      Race    Color    National Origin    Yazidi    Creed    Sex    Sexual Orientation    Marital Status    Public Assistance Status    Contact the MD directly to file a complaint:         [place] Department of Human Rights         [facility], [address] [place] [address]         [phone] (voice)          [phone] (toll free)         [phone] or [phone] ([place] Relay)         [phone] (Fax)         [email or web address] (Email)     [place] Department of Human Services (DHS)  You have the right to file a complaint with [facility] if you believe you have been discriminated against in our health care programs because of any of the following:    Race    Color    National Origin    Creed    Yazidi    Age    Public Assistance Status    Receipt of Health Care Services    Disability (including physical or mental impairment)    Sex (including sex stereotypes and gender identity)    Marital Status    Political Beliefs    Medical Condition    Genetic Information    Sexual Orientation    Claims Experience    Medical History    Health Status    Complaints must be in writing and filed within 180 days of the date you discovered the alleged discrimination. The complaint must contain your name and address and describe the discrimination you are complaining about. After we get your complaint, we will review it and notify you in writing about whether we have authority to investigate. If we do, we will investigate the complaint.      [facility] will notify you in writing of the investigation s outcome. You have a right to appeal the outcome if you disagree with the decision. To appeal, you must send a written request to have [facility] review the investigation outcome period. Be brief and state why you disagree with the decision. Include additional information you think is important.      If you file a complaint in this way, the people  who work for the agency named in the complaint cannot retaliate against you. This means they cannot punish you in any way for filing a complaint. Filing a complaint in this way does not stop you from seeking out other legal or administration actions.     Contact DHS directly to file a discrimination complaint:        ATTN: Civil Rights Coordinator        [place] Department of Human Services        Equal Opportunity and Access Division        [address] [address]        [place] [address] [phone] (voice) or use your preferred relay service     [employer] Complaint Notice   Contact [employer] directly to file a discrimination complaint:  [employer] Civil Rights Coordinator  Mail Route   [address]  [place] [address] [phone] (voice) or use your preferred relay service  [email or web address]

## 2019-02-13 NOTE — PROGRESS NOTES
[facility] Care Coordination Contact    Member became effective with  [facility] on 2/1/19 with [facility].  Previous Health Plan: [employer]  Previous Care System: [employer]  Previous care coordinators name and number: Kala Grant - [facility] info received per Tatyana Austin Type: EW  Last MMIS Entry: Date 12/11/18 and Type 11 reopen  MMIS visit date if different from above: N/A  Services Listed in MMIS:   86 C 24 CL50 3 05 C JERAD DINE 06 C HOMEMAKER  35 F CASE MGMT 25 F SUPPLIES 62 C LAUNDRY  Member currently receiving the following home care services:     Member currently receiving the following community resources:    UTF received: Did not request anything b/c Tatyana states we have the UTF already I emailed her asking for her to send it to me to save to the [facility] Collier Digatono  Case Management Specialist   [facility]   [phone]

## 2019-02-14 ENCOUNTER — HOSPITAL LABORATORY (OUTPATIENT)
Facility: OTHER | Age: 84
End: 2019-02-14

## 2019-02-14 LAB
ANION GAP SERPL CALCULATED.3IONS-SCNC: 5 MMOL/L (ref 3–14)
BUN SERPL-MCNC: 27 MG/DL (ref 7–30)
CALCIUM SERPL-MCNC: 9.1 MG/DL (ref 8.5–10.1)
CHLORIDE SERPL-SCNC: 105 MMOL/L (ref 94–109)
CO2 SERPL-SCNC: 29 MMOL/L (ref 20–32)
CREAT SERPL-MCNC: 1.57 MG/DL (ref 0.52–1.04)
ERYTHROCYTE [DISTWIDTH] IN BLOOD BY AUTOMATED COUNT: 18.9 % (ref 10–15)
GFR SERPL CREATININE-BSD FRML MDRD: 30 ML/MIN/{1.73_M2}
GLUCOSE SERPL-MCNC: 129 MG/DL (ref 70–99)
HCT VFR BLD AUTO: 33.7 % (ref 35–47)
HGB BLD-MCNC: 9.8 G/DL (ref 11.7–15.7)
MCH RBC QN AUTO: 27.1 PG (ref 26.5–33)
MCHC RBC AUTO-ENTMCNC: 29.1 G/DL (ref 31.5–36.5)
MCV RBC AUTO: 93 FL (ref 78–100)
PLATELET # BLD AUTO: 229 10E9/L (ref 150–450)
POTASSIUM SERPL-SCNC: 3.8 MMOL/L (ref 3.4–5.3)
RBC # BLD AUTO: 3.62 10E12/L (ref 3.8–5.2)
SODIUM SERPL-SCNC: 139 MMOL/L (ref 133–144)
WBC # BLD AUTO: 6.3 10E9/L (ref 4–11)

## 2019-02-15 DIAGNOSIS — L03.115 CELLULITIS OF RIGHT LOWER EXTREMITY: Primary | ICD-10-CM

## 2019-02-15 RX ORDER — CEPHALEXIN 500 MG/1
500 CAPSULE ORAL 2 TIMES DAILY
Qty: 14 CAPSULE | Refills: 0 | Status: ON HOLD | OUTPATIENT
Start: 2019-02-15 | End: 2019-02-22

## 2019-02-18 ENCOUNTER — NURSING HOME VISIT (OUTPATIENT)
Dept: GERIATRICS | Facility: CLINIC | Age: 84
End: 2019-02-18
Payer: COMMERCIAL

## 2019-02-18 VITALS
TEMPERATURE: 97.7 F | WEIGHT: 201 LBS | HEART RATE: 84 BPM | DIASTOLIC BLOOD PRESSURE: 76 MMHG | BODY MASS INDEX: 35.61 KG/M2 | SYSTOLIC BLOOD PRESSURE: 153 MMHG | RESPIRATION RATE: 18 BRPM

## 2019-02-18 DIAGNOSIS — L03.115 CELLULITIS OF RIGHT LOWER EXTREMITY: Primary | ICD-10-CM

## 2019-02-18 DIAGNOSIS — R60.0 BILATERAL LEG EDEMA: ICD-10-CM

## 2019-02-18 DIAGNOSIS — N18.30 CKD (CHRONIC KIDNEY DISEASE) STAGE 3, GFR 30-59 ML/MIN (H): ICD-10-CM

## 2019-02-18 DIAGNOSIS — I50.9 CONGESTIVE HEART FAILURE, UNSPECIFIED HF CHRONICITY, UNSPECIFIED HEART FAILURE TYPE (H): ICD-10-CM

## 2019-02-18 NOTE — PROGRESS NOTES
[facility]    Chief Complaint   Patient presents with     JUAN R       [facility] Record Number:  [id number]  Place of Service where encounter took place:  [facility] [[id number]]    HPI:    Jazmin Clifton is a 86 year old  (12/30/1932), who is being seen today for an episodic care visit.  HPI information obtained from: facility chart records, facility staff, patient report and [facility] Epic chart review.    Cellulitis of right lower extremity  Seen recently in ED for right hip pain. Was found to have UTI and was started on Cipro and discharged back to TCU. She started outpatient PT and lymph wraps were applied. On wrap removed on 2/15 staff reported excessive warmth and erythema. She was started on Keflex 500mg BID x 7 day. Cipro discontinued. She has remained afebrile. Patient seen during lymph therapy today, per therapist leg is less swollen and warm, continues to have some erythema, less tenderness.     Congestive heart failure, unspecified HF chronicity, unspecified heart failure type (H)  Bilateral leg edema  Has had worsening edema over the past few weeks, however weight has remained stable 195-200lbs. She was started on lymph wraps with therapy last week. Denies any chest pain or SOB. Is concerned about the swelling in her legs - has blister to left foot which popped over the weekend. Legs appear significantly improved today. She has known seroma to right hip that she states she also thinks is also down in size. She was referred to surgery during her recent ED visit but she states she never wants to have surgery again does not want to keep the apt.     CKD (chronic kidney disease) stage 3, GFR 30-59 ml/min (H)  Suspect baseline 1.2-1.5. She does not think she is going to the bathroom enough. She reports she uses the restroom about 3 times a day, but does have some incontinence and soaks her incontinence garments nightly - states she is afraid she will wet  the bed. She is on diuretics. She states she has previously seen nephrology, but not for years. Denies any burning or urinary discomfort.      Today's concern:    REVIEW OF SYSTEMS:  4 point ROS including Respiratory, CV, GI and , other than that noted in the HPI,  is negative    /76   Pulse 84   Temp 97.7  F (36.5  C)   Resp 18   Wt 91.2 kg (201 lb)   BMI 35.61 kg/m    GENERAL APPEARANCE:  Alert, in no distress  ENT:  Mouth and posterior oropharynx normal, moist mucous membranes, hearing acuity  RESP:  respiratory effort and palpation of chest normal, no respiratory distress, Lung sounds clear t/o, no crackles or wheezing  CV:  Palpation and auscultation of heart done , rate and rhythm regular, no murmur, no rub or gallop, Edema 2+ BLE,   ABDOMEN:  normal bowel sounds, soft, nontender, no hepatosplenomegaly or other masses  M/S:   Gait and station generalized weakness, right buttock mild tenderness small steps,  SKIN:  Moderate erythema, warmth to RLE anterior shin ankle to  Below knee; open blister, ~1 inch on left foot anterior to toes 4-2, scant amount of clear drainage, no erythema  NEURO: 2-12 in normal limits and at patient's baseline  PSYCH:  insight and judgement, memory fair , affect and mood normal    ASSESSMENT/PLAN:  (L03.115) Cellulitis of right lower extremity  (primary encounter diagnosis)  Comment: improving, no s/s of bacteremia,  Plan: continue keflex, monitor for fever,     (I50.9) Congestive heart failure, unspecified HF chronicity, unspecified heart failure type (H)  (R60.0) Bilateral leg edema  Comment: CHF likely compensated - suspect LE edema multifactorial given recent back surgery, chronic edema and sitting with legs dependent during much of the day. She is not very consistent with compression, often does not wear it  Plan: Continue lasix 40mg q AM, 20mg q afternoon, ASA 81mg, daily weights, imdur 30mg daily, isradipine 5mg BID, lisinopril 20mg daily, continue lymph wraps -  therapy to work with patient to find long term compression garments for BLE, patient to wear spandex underwear as tolerated as she refuses to wear recommended Spanks as recommended by vascular surgery as they are too tight. Suspect blister will heal with treatment of compression. Continue to monitor and adjust     (N18.3) CKD (chronic kidney disease) stage 3, GFR 30-59 ml/min (H)  Comment: mildly above baseline, suspect d/t recent abx use  Plan: BMP to monitor for stability, avoid nephrotoxic medications. Monitor. If worsening will consider nephrology referral.       The above was discussed with her son Khoa Clifton via phone. States he plans to encourage his mother to wear compression.      Orders:  1. discontinue cyclobenzaprine.  2. BMP on next lab day      Electronically signed by:  MARY Gómez CNP

## 2019-02-19 ENCOUNTER — APPOINTMENT (OUTPATIENT)
Dept: MRI IMAGING | Facility: CLINIC | Age: 84
DRG: 190 | End: 2019-02-19
Attending: FAMILY MEDICINE
Payer: COMMERCIAL

## 2019-02-19 ENCOUNTER — APPOINTMENT (OUTPATIENT)
Dept: GENERAL RADIOLOGY | Facility: CLINIC | Age: 84
DRG: 190 | End: 2019-02-19
Attending: FAMILY MEDICINE
Payer: COMMERCIAL

## 2019-02-19 ENCOUNTER — APPOINTMENT (OUTPATIENT)
Dept: CARDIOLOGY | Facility: CLINIC | Age: 84
DRG: 190 | End: 2019-02-19
Attending: FAMILY MEDICINE
Payer: COMMERCIAL

## 2019-02-19 ENCOUNTER — APPOINTMENT (OUTPATIENT)
Dept: OCCUPATIONAL THERAPY | Facility: CLINIC | Age: 84
DRG: 190 | End: 2019-02-19
Payer: COMMERCIAL

## 2019-02-19 ENCOUNTER — APPOINTMENT (OUTPATIENT)
Dept: CT IMAGING | Facility: CLINIC | Age: 84
DRG: 190 | End: 2019-02-19
Attending: EMERGENCY MEDICINE
Payer: COMMERCIAL

## 2019-02-19 ENCOUNTER — HOSPITAL ENCOUNTER (INPATIENT)
Facility: CLINIC | Age: 84
LOS: 3 days | Discharge: NURSING, REHAB OR LONG-TERM CARE | DRG: 190 | End: 2019-02-22
Attending: EMERGENCY MEDICINE | Admitting: FAMILY MEDICINE
Payer: COMMERCIAL

## 2019-02-19 DIAGNOSIS — R29.810 FACIAL DROOP: ICD-10-CM

## 2019-02-19 DIAGNOSIS — I89.0 LYMPHEDEMA: ICD-10-CM

## 2019-02-19 DIAGNOSIS — N28.9 RENAL INSUFFICIENCY: ICD-10-CM

## 2019-02-19 DIAGNOSIS — D64.9 ANEMIA, UNSPECIFIED TYPE: ICD-10-CM

## 2019-02-19 DIAGNOSIS — R42 DIZZINESS: ICD-10-CM

## 2019-02-19 DIAGNOSIS — M15.0 PRIMARY OSTEOARTHRITIS INVOLVING MULTIPLE JOINTS: ICD-10-CM

## 2019-02-19 DIAGNOSIS — J44.9 CHRONIC OBSTRUCTIVE PULMONARY DISEASE, UNSPECIFIED COPD TYPE (H): Primary | Chronic | ICD-10-CM

## 2019-02-19 PROBLEM — G31.84 MILD COGNITIVE IMPAIRMENT: Chronic | Status: ACTIVE | Noted: 2017-09-22

## 2019-02-19 PROBLEM — Z98.890 S/P CAROTID ENDARTERECTOMY: Status: ACTIVE | Noted: 2017-09-06

## 2019-02-19 PROBLEM — R06.02 SHORTNESS OF BREATH: Status: ACTIVE | Noted: 2019-02-19

## 2019-02-19 PROBLEM — E66.01 MORBID OBESITY (H): Chronic | Status: ACTIVE | Noted: 2018-06-19

## 2019-02-19 PROBLEM — I63.9 CVA (CEREBRAL VASCULAR ACCIDENT) (H): Status: ACTIVE | Noted: 2017-07-26

## 2019-02-19 PROBLEM — N18.30 CKD (CHRONIC KIDNEY DISEASE) STAGE 3, GFR 30-59 ML/MIN (H): Chronic | Status: ACTIVE | Noted: 2017-08-16

## 2019-02-19 PROBLEM — N39.0 URINARY TRACT INFECTION WITHOUT HEMATURIA, SITE UNSPECIFIED: Status: RESOLVED | Noted: 2017-08-23 | Resolved: 2019-02-19

## 2019-02-19 PROBLEM — Z98.890 S/P CAROTID ENDARTERECTOMY: Chronic | Status: ACTIVE | Noted: 2017-09-06

## 2019-02-19 PROBLEM — N18.30 CKD (CHRONIC KIDNEY DISEASE) STAGE 3, GFR 30-59 ML/MIN (H): Status: ACTIVE | Noted: 2017-08-16

## 2019-02-19 PROBLEM — I65.21 CAROTID ARTERY STENOSIS, SYMPTOMATIC, RIGHT: Status: RESOLVED | Noted: 2017-08-01 | Resolved: 2019-02-19

## 2019-02-19 PROBLEM — I95.9 HYPOTENSION, UNSPECIFIED HYPOTENSION TYPE: Status: RESOLVED | Noted: 2017-07-25 | Resolved: 2019-02-19

## 2019-02-19 LAB
ALBUMIN SERPL-MCNC: 3.1 G/DL (ref 3.4–5)
ALBUMIN UR-MCNC: NEGATIVE MG/DL
ALP SERPL-CCNC: 69 U/L (ref 40–150)
ALT SERPL W P-5'-P-CCNC: 15 U/L (ref 0–50)
ANION GAP SERPL CALCULATED.3IONS-SCNC: 5 MMOL/L (ref 3–14)
APPEARANCE UR: CLEAR
APTT PPP: 33 SEC (ref 22–37)
AST SERPL W P-5'-P-CCNC: 17 U/L (ref 0–45)
BASE EXCESS BLDV CALC-SCNC: 5.3 MMOL/L
BASOPHILS # BLD AUTO: 0 10E9/L (ref 0–0.2)
BASOPHILS NFR BLD AUTO: 0.4 %
BILIRUB SERPL-MCNC: 0.4 MG/DL (ref 0.2–1.3)
BILIRUB UR QL STRIP: NEGATIVE
BUN SERPL-MCNC: 26 MG/DL (ref 7–30)
CALCIUM SERPL-MCNC: 8.5 MG/DL (ref 8.5–10.1)
CHLORIDE SERPL-SCNC: 106 MMOL/L (ref 94–109)
CO2 SERPL-SCNC: 31 MMOL/L (ref 20–32)
COLOR UR AUTO: YELLOW
CREAT SERPL-MCNC: 1.53 MG/DL (ref 0.52–1.04)
CRP SERPL-MCNC: 15.4 MG/L (ref 0–8)
DIFFERENTIAL METHOD BLD: ABNORMAL
EOSINOPHIL # BLD AUTO: 0.2 10E9/L (ref 0–0.7)
EOSINOPHIL NFR BLD AUTO: 3.3 %
ERYTHROCYTE [DISTWIDTH] IN BLOOD BY AUTOMATED COUNT: 18.7 % (ref 10–15)
GFR SERPL CREATININE-BSD FRML MDRD: 30 ML/MIN/{1.73_M2}
GLUCOSE SERPL-MCNC: 92 MG/DL (ref 70–99)
GLUCOSE UR STRIP-MCNC: NEGATIVE MG/DL
HCO3 BLDV-SCNC: 32 MMOL/L (ref 21–28)
HCT VFR BLD AUTO: 34.8 % (ref 35–47)
HGB BLD-MCNC: 10.2 G/DL (ref 11.7–15.7)
HGB UR QL STRIP: NEGATIVE
IMM GRANULOCYTES # BLD: 0 10E9/L (ref 0–0.4)
IMM GRANULOCYTES NFR BLD: 0.4 %
INR PPP: 1.09 (ref 0.86–1.14)
IRON SATN MFR SERPL: 10 % (ref 15–46)
IRON SERPL-MCNC: 27 UG/DL (ref 35–180)
KETONES UR STRIP-MCNC: NEGATIVE MG/DL
LEUKOCYTE ESTERASE UR QL STRIP: NEGATIVE
LYMPHOCYTES # BLD AUTO: 1 10E9/L (ref 0.8–5.3)
LYMPHOCYTES NFR BLD AUTO: 21 %
MCH RBC QN AUTO: 27.1 PG (ref 26.5–33)
MCHC RBC AUTO-ENTMCNC: 29.3 G/DL (ref 31.5–36.5)
MCV RBC AUTO: 92 FL (ref 78–100)
MONOCYTES # BLD AUTO: 0.5 10E9/L (ref 0–1.3)
MONOCYTES NFR BLD AUTO: 11.8 %
NEUTROPHILS # BLD AUTO: 2.9 10E9/L (ref 1.6–8.3)
NEUTROPHILS NFR BLD AUTO: 63.1 %
NITRATE UR QL: NEGATIVE
NRBC # BLD AUTO: 0 10*3/UL
NRBC BLD AUTO-RTO: 0 /100
NT-PROBNP SERPL-MCNC: 759 PG/ML (ref 0–1800)
O2/TOTAL GAS SETTING VFR VENT: ABNORMAL %
PCO2 BLDV: 59 MM HG (ref 40–50)
PH BLDV: 7.34 PH (ref 7.32–7.43)
PH UR STRIP: 8 PH (ref 5–7)
PLATELET # BLD AUTO: 228 10E9/L (ref 150–450)
PO2 BLDV: 28 MM HG (ref 25–47)
POTASSIUM SERPL-SCNC: 3.9 MMOL/L (ref 3.4–5.3)
PROCALCITONIN SERPL-MCNC: <0.05 NG/ML
PROT SERPL-MCNC: 6 G/DL (ref 6.8–8.8)
RBC # BLD AUTO: 3.77 10E12/L (ref 3.8–5.2)
SODIUM SERPL-SCNC: 142 MMOL/L (ref 133–144)
SOURCE: ABNORMAL
SP GR UR STRIP: 1.01 (ref 1–1.03)
TIBC SERPL-MCNC: 258 UG/DL (ref 240–430)
TROPONIN I SERPL-MCNC: <0.015 UG/L (ref 0–0.04)
UROBILINOGEN UR STRIP-MCNC: 0 MG/DL (ref 0–2)
VIT B12 SERPL-MCNC: 593 PG/ML (ref 193–986)
WBC # BLD AUTO: 4.6 10E9/L (ref 4–11)

## 2019-02-19 PROCEDURE — 25000128 H RX IP 250 OP 636: Performed by: FAMILY MEDICINE

## 2019-02-19 PROCEDURE — 85730 THROMBOPLASTIN TIME PARTIAL: CPT | Performed by: EMERGENCY MEDICINE

## 2019-02-19 PROCEDURE — 96375 TX/PRO/DX INJ NEW DRUG ADDON: CPT

## 2019-02-19 PROCEDURE — 70450 CT HEAD/BRAIN W/O DYE: CPT

## 2019-02-19 PROCEDURE — 94640 AIRWAY INHALATION TREATMENT: CPT

## 2019-02-19 PROCEDURE — 99291 CRITICAL CARE FIRST HOUR: CPT | Mod: 25 | Performed by: EMERGENCY MEDICINE

## 2019-02-19 PROCEDURE — 99285 EMERGENCY DEPT VISIT HI MDM: CPT | Mod: 25

## 2019-02-19 PROCEDURE — 25000125 ZZHC RX 250: Performed by: FAMILY MEDICINE

## 2019-02-19 PROCEDURE — 83550 IRON BINDING TEST: CPT | Performed by: FAMILY MEDICINE

## 2019-02-19 PROCEDURE — 71046 X-RAY EXAM CHEST 2 VIEWS: CPT

## 2019-02-19 PROCEDURE — 70547 MR ANGIOGRAPHY NECK W/O DYE: CPT

## 2019-02-19 PROCEDURE — 97535 SELF CARE MNGMENT TRAINING: CPT | Mod: GO

## 2019-02-19 PROCEDURE — 70544 MR ANGIOGRAPHY HEAD W/O DYE: CPT

## 2019-02-19 PROCEDURE — 85610 PROTHROMBIN TIME: CPT | Performed by: EMERGENCY MEDICINE

## 2019-02-19 PROCEDURE — 40000274 ZZH STATISTIC RCP CONSULT EA 30 MIN

## 2019-02-19 PROCEDURE — 12000000 ZZH R&B MED SURG/OB

## 2019-02-19 PROCEDURE — 93306 TTE W/DOPPLER COMPLETE: CPT | Mod: 26 | Performed by: INTERNAL MEDICINE

## 2019-02-19 PROCEDURE — 36415 COLL VENOUS BLD VENIPUNCTURE: CPT | Performed by: FAMILY MEDICINE

## 2019-02-19 PROCEDURE — 70551 MRI BRAIN STEM W/O DYE: CPT

## 2019-02-19 PROCEDURE — 94640 AIRWAY INHALATION TREATMENT: CPT | Mod: 76

## 2019-02-19 PROCEDURE — 25000128 H RX IP 250 OP 636: Performed by: EMERGENCY MEDICINE

## 2019-02-19 PROCEDURE — 96374 THER/PROPH/DIAG INJ IV PUSH: CPT

## 2019-02-19 PROCEDURE — 83880 ASSAY OF NATRIURETIC PEPTIDE: CPT | Performed by: FAMILY MEDICINE

## 2019-02-19 PROCEDURE — 93010 ELECTROCARDIOGRAM REPORT: CPT | Mod: Z6 | Performed by: EMERGENCY MEDICINE

## 2019-02-19 PROCEDURE — 93005 ELECTROCARDIOGRAM TRACING: CPT

## 2019-02-19 PROCEDURE — 40000264 ECHOCARDIOGRAM COMPLETE

## 2019-02-19 PROCEDURE — 97165 OT EVAL LOW COMPLEX 30 MIN: CPT | Mod: GO

## 2019-02-19 PROCEDURE — 82803 BLOOD GASES ANY COMBINATION: CPT | Performed by: FAMILY MEDICINE

## 2019-02-19 PROCEDURE — 40000275 ZZH STATISTIC RCP TIME EA 10 MIN

## 2019-02-19 PROCEDURE — 83540 ASSAY OF IRON: CPT | Performed by: FAMILY MEDICINE

## 2019-02-19 PROCEDURE — 82607 VITAMIN B-12: CPT | Performed by: FAMILY MEDICINE

## 2019-02-19 PROCEDURE — 25000132 ZZH RX MED GY IP 250 OP 250 PS 637: Performed by: FAMILY MEDICINE

## 2019-02-19 PROCEDURE — 86140 C-REACTIVE PROTEIN: CPT | Performed by: FAMILY MEDICINE

## 2019-02-19 PROCEDURE — 99223 1ST HOSP IP/OBS HIGH 75: CPT | Mod: AI | Performed by: FAMILY MEDICINE

## 2019-02-19 PROCEDURE — 80053 COMPREHEN METABOLIC PANEL: CPT | Performed by: EMERGENCY MEDICINE

## 2019-02-19 PROCEDURE — 81003 URINALYSIS AUTO W/O SCOPE: CPT | Performed by: FAMILY MEDICINE

## 2019-02-19 PROCEDURE — 84484 ASSAY OF TROPONIN QUANT: CPT | Performed by: EMERGENCY MEDICINE

## 2019-02-19 PROCEDURE — 40000270 ZZH STATISTIC OXYGEN  O2DAILY TECH TIME

## 2019-02-19 PROCEDURE — 25800030 ZZH RX IP 258 OP 636: Performed by: EMERGENCY MEDICINE

## 2019-02-19 PROCEDURE — 25500064 ZZH RX 255 OP 636: Performed by: FAMILY MEDICINE

## 2019-02-19 PROCEDURE — 85025 COMPLETE CBC W/AUTO DIFF WBC: CPT | Performed by: EMERGENCY MEDICINE

## 2019-02-19 PROCEDURE — 25000132 ZZH RX MED GY IP 250 OP 250 PS 637: Performed by: PHYSICIAN ASSISTANT

## 2019-02-19 PROCEDURE — 40000133 ZZH STATISTIC OT WARD VISIT

## 2019-02-19 PROCEDURE — 84145 PROCALCITONIN (PCT): CPT | Performed by: FAMILY MEDICINE

## 2019-02-19 RX ORDER — ACETAMINOPHEN 650 MG/1
650 SUPPOSITORY RECTAL EVERY 4 HOURS PRN
Status: DISCONTINUED | OUTPATIENT
Start: 2019-02-19 | End: 2019-02-22 | Stop reason: HOSPADM

## 2019-02-19 RX ORDER — NALOXONE HYDROCHLORIDE 0.4 MG/ML
.1-.4 INJECTION, SOLUTION INTRAMUSCULAR; INTRAVENOUS; SUBCUTANEOUS
Status: DISCONTINUED | OUTPATIENT
Start: 2019-02-19 | End: 2019-02-22 | Stop reason: HOSPADM

## 2019-02-19 RX ORDER — ATORVASTATIN CALCIUM 20 MG/1
40 TABLET, FILM COATED ORAL DAILY
Status: DISCONTINUED | OUTPATIENT
Start: 2019-02-19 | End: 2019-02-22 | Stop reason: HOSPADM

## 2019-02-19 RX ORDER — BISACODYL 5 MG
15 TABLET, DELAYED RELEASE (ENTERIC COATED) ORAL DAILY PRN
Status: DISCONTINUED | OUTPATIENT
Start: 2019-02-19 | End: 2019-02-22 | Stop reason: HOSPADM

## 2019-02-19 RX ORDER — GABAPENTIN 100 MG/1
100 CAPSULE ORAL EVERY MORNING
Status: DISCONTINUED | OUTPATIENT
Start: 2019-02-20 | End: 2019-02-22 | Stop reason: HOSPADM

## 2019-02-19 RX ORDER — BISACODYL 5 MG
5 TABLET, DELAYED RELEASE (ENTERIC COATED) ORAL DAILY PRN
Status: CANCELLED | OUTPATIENT
Start: 2019-02-19

## 2019-02-19 RX ORDER — POLYETHYLENE GLYCOL 3350 17 G/17G
17 POWDER, FOR SOLUTION ORAL DAILY PRN
Status: DISCONTINUED | OUTPATIENT
Start: 2019-02-19 | End: 2019-02-22 | Stop reason: HOSPADM

## 2019-02-19 RX ORDER — ONDANSETRON 2 MG/ML
4 INJECTION INTRAMUSCULAR; INTRAVENOUS ONCE
Status: COMPLETED | OUTPATIENT
Start: 2019-02-19 | End: 2019-02-19

## 2019-02-19 RX ORDER — BISACODYL 5 MG
10 TABLET, DELAYED RELEASE (ENTERIC COATED) ORAL DAILY PRN
Status: DISCONTINUED | OUTPATIENT
Start: 2019-02-19 | End: 2019-02-22 | Stop reason: HOSPADM

## 2019-02-19 RX ORDER — ISOSORBIDE MONONITRATE 30 MG/1
30 TABLET, EXTENDED RELEASE ORAL DAILY
Status: DISCONTINUED | OUTPATIENT
Start: 2019-02-19 | End: 2019-02-22 | Stop reason: HOSPADM

## 2019-02-19 RX ORDER — IPRATROPIUM BROMIDE AND ALBUTEROL SULFATE 2.5; .5 MG/3ML; MG/3ML
3 SOLUTION RESPIRATORY (INHALATION)
Status: DISCONTINUED | OUTPATIENT
Start: 2019-02-19 | End: 2019-02-22 | Stop reason: HOSPADM

## 2019-02-19 RX ORDER — PROCHLORPERAZINE 25 MG
12.5 SUPPOSITORY, RECTAL RECTAL EVERY 12 HOURS PRN
Status: DISCONTINUED | OUTPATIENT
Start: 2019-02-19 | End: 2019-02-22 | Stop reason: HOSPADM

## 2019-02-19 RX ORDER — MEMANTINE HYDROCHLORIDE 5 MG/1
5 TABLET ORAL DAILY
Status: CANCELLED | OUTPATIENT
Start: 2019-02-19

## 2019-02-19 RX ORDER — PROCHLORPERAZINE MALEATE 5 MG
5 TABLET ORAL EVERY 6 HOURS PRN
Status: CANCELLED | OUTPATIENT
Start: 2019-02-19

## 2019-02-19 RX ORDER — ISRADIPINE 5 MG/1
5 CAPSULE ORAL 2 TIMES DAILY
Status: DISCONTINUED | OUTPATIENT
Start: 2019-02-19 | End: 2019-02-19 | Stop reason: CLARIF

## 2019-02-19 RX ORDER — IOPAMIDOL 755 MG/ML
70 INJECTION, SOLUTION INTRAVASCULAR ONCE
Status: DISCONTINUED | OUTPATIENT
Start: 2019-02-19 | End: 2019-02-22 | Stop reason: HOSPADM

## 2019-02-19 RX ORDER — GABAPENTIN 100 MG/1
200 CAPSULE ORAL AT BEDTIME
Status: CANCELLED | OUTPATIENT
Start: 2019-02-19

## 2019-02-19 RX ORDER — FERROUS SULFATE 325(65) MG
325 TABLET ORAL
Status: CANCELLED | OUTPATIENT
Start: 2019-02-19

## 2019-02-19 RX ORDER — METOCLOPRAMIDE 5 MG/1
5 TABLET ORAL EVERY 6 HOURS PRN
Status: CANCELLED | OUTPATIENT
Start: 2019-02-19

## 2019-02-19 RX ORDER — CEPHALEXIN 500 MG/1
500 CAPSULE ORAL 2 TIMES DAILY
Status: CANCELLED | OUTPATIENT
Start: 2019-02-19

## 2019-02-19 RX ORDER — PREDNISONE 20 MG/1
40 TABLET ORAL DAILY
Status: DISCONTINUED | OUTPATIENT
Start: 2019-02-19 | End: 2019-02-22 | Stop reason: HOSPADM

## 2019-02-19 RX ORDER — OXYCODONE HYDROCHLORIDE 5 MG/1
5 TABLET ORAL EVERY 4 HOURS PRN
Status: DISCONTINUED | OUTPATIENT
Start: 2019-02-19 | End: 2019-02-22 | Stop reason: HOSPADM

## 2019-02-19 RX ORDER — ACETAMINOPHEN 325 MG/1
650 TABLET ORAL EVERY 4 HOURS PRN
Status: CANCELLED | OUTPATIENT
Start: 2019-02-19

## 2019-02-19 RX ORDER — ONDANSETRON 2 MG/ML
4 INJECTION INTRAMUSCULAR; INTRAVENOUS EVERY 6 HOURS PRN
Status: CANCELLED | OUTPATIENT
Start: 2019-02-19

## 2019-02-19 RX ORDER — PROCHLORPERAZINE 25 MG
12.5 SUPPOSITORY, RECTAL RECTAL EVERY 12 HOURS PRN
Status: CANCELLED | OUTPATIENT
Start: 2019-02-19

## 2019-02-19 RX ORDER — ACETAMINOPHEN 650 MG/1
650 SUPPOSITORY RECTAL EVERY 4 HOURS PRN
Status: CANCELLED | OUTPATIENT
Start: 2019-02-19

## 2019-02-19 RX ORDER — ACETAMINOPHEN 500 MG
1000 TABLET ORAL 3 TIMES DAILY
Status: DISCONTINUED | OUTPATIENT
Start: 2019-02-19 | End: 2019-02-22 | Stop reason: HOSPADM

## 2019-02-19 RX ORDER — METOCLOPRAMIDE 5 MG/1
5 TABLET ORAL EVERY 6 HOURS PRN
Status: DISCONTINUED | OUTPATIENT
Start: 2019-02-19 | End: 2019-02-22 | Stop reason: HOSPADM

## 2019-02-19 RX ORDER — ACETAMINOPHEN 325 MG/1
650 TABLET ORAL EVERY 4 HOURS PRN
Status: DISCONTINUED | OUTPATIENT
Start: 2019-02-19 | End: 2019-02-22 | Stop reason: HOSPADM

## 2019-02-19 RX ORDER — FUROSEMIDE 10 MG/ML
40 INJECTION INTRAMUSCULAR; INTRAVENOUS ONCE
Status: COMPLETED | OUTPATIENT
Start: 2019-02-19 | End: 2019-02-19

## 2019-02-19 RX ORDER — ISOSORBIDE MONONITRATE 30 MG/1
30 TABLET, EXTENDED RELEASE ORAL DAILY
Status: CANCELLED | OUTPATIENT
Start: 2019-02-19

## 2019-02-19 RX ORDER — ATORVASTATIN CALCIUM 40 MG/1
40 TABLET, FILM COATED ORAL DAILY
Status: CANCELLED | OUTPATIENT
Start: 2019-02-19

## 2019-02-19 RX ORDER — METOCLOPRAMIDE HYDROCHLORIDE 5 MG/ML
5 INJECTION INTRAMUSCULAR; INTRAVENOUS EVERY 6 HOURS PRN
Status: CANCELLED | OUTPATIENT
Start: 2019-02-19

## 2019-02-19 RX ORDER — GABAPENTIN 100 MG/1
200 CAPSULE ORAL AT BEDTIME
Status: DISCONTINUED | OUTPATIENT
Start: 2019-02-19 | End: 2019-02-22 | Stop reason: HOSPADM

## 2019-02-19 RX ORDER — ASPIRIN 81 MG/1
81 TABLET, CHEWABLE ORAL ONCE
Status: CANCELLED | OUTPATIENT
Start: 2019-02-19

## 2019-02-19 RX ORDER — BISACODYL 5 MG
15 TABLET, DELAYED RELEASE (ENTERIC COATED) ORAL DAILY PRN
Status: CANCELLED | OUTPATIENT
Start: 2019-02-19

## 2019-02-19 RX ORDER — FLUTICASONE PROPIONATE 50 MCG
1 SPRAY, SUSPENSION (ML) NASAL
COMMUNITY
End: 2019-04-24

## 2019-02-19 RX ORDER — GABAPENTIN 100 MG/1
100 CAPSULE ORAL EVERY MORNING
Status: CANCELLED | OUTPATIENT
Start: 2019-02-19

## 2019-02-19 RX ORDER — BISACODYL 5 MG
10 TABLET, DELAYED RELEASE (ENTERIC COATED) ORAL DAILY PRN
Status: CANCELLED | OUTPATIENT
Start: 2019-02-19

## 2019-02-19 RX ORDER — POLYETHYLENE GLYCOL 3350 17 G/17G
17 POWDER, FOR SOLUTION ORAL DAILY PRN
Status: CANCELLED | OUTPATIENT
Start: 2019-02-19

## 2019-02-19 RX ORDER — MEMANTINE HYDROCHLORIDE 5 MG/1
5 TABLET ORAL DAILY
Status: DISCONTINUED | OUTPATIENT
Start: 2019-02-19 | End: 2019-02-22 | Stop reason: HOSPADM

## 2019-02-19 RX ORDER — DULOXETIN HYDROCHLORIDE 30 MG/1
30 CAPSULE, DELAYED RELEASE ORAL DAILY
Status: CANCELLED | OUTPATIENT
Start: 2019-02-19

## 2019-02-19 RX ORDER — SODIUM CHLORIDE 9 MG/ML
60 INJECTION, SOLUTION INTRAVENOUS ONCE
Status: DISCONTINUED | OUTPATIENT
Start: 2019-02-19 | End: 2019-02-22 | Stop reason: HOSPADM

## 2019-02-19 RX ORDER — FUROSEMIDE 20 MG
40 TABLET ORAL EVERY MORNING
Status: CANCELLED | OUTPATIENT
Start: 2019-02-19

## 2019-02-19 RX ORDER — FELODIPINE 5 MG/1
10 TABLET, EXTENDED RELEASE ORAL EVERY EVENING
Status: DISCONTINUED | OUTPATIENT
Start: 2019-02-19 | End: 2019-02-22 | Stop reason: HOSPADM

## 2019-02-19 RX ORDER — DONEPEZIL HYDROCHLORIDE 5 MG/1
5 TABLET, FILM COATED ORAL DAILY
Status: CANCELLED | OUTPATIENT
Start: 2019-02-19

## 2019-02-19 RX ORDER — BISACODYL 5 MG
5 TABLET, DELAYED RELEASE (ENTERIC COATED) ORAL DAILY PRN
Status: DISCONTINUED | OUTPATIENT
Start: 2019-02-19 | End: 2019-02-22 | Stop reason: HOSPADM

## 2019-02-19 RX ORDER — METOCLOPRAMIDE HYDROCHLORIDE 5 MG/ML
5 INJECTION INTRAMUSCULAR; INTRAVENOUS EVERY 6 HOURS PRN
Status: DISCONTINUED | OUTPATIENT
Start: 2019-02-19 | End: 2019-02-22 | Stop reason: HOSPADM

## 2019-02-19 RX ORDER — TIZANIDINE 2 MG/1
2 TABLET ORAL 3 TIMES DAILY PRN
Status: DISCONTINUED | OUTPATIENT
Start: 2019-02-19 | End: 2019-02-22 | Stop reason: HOSPADM

## 2019-02-19 RX ORDER — ISRADIPINE 5 MG/1
5 CAPSULE ORAL 2 TIMES DAILY
Status: CANCELLED | OUTPATIENT
Start: 2019-02-19

## 2019-02-19 RX ORDER — ONDANSETRON 2 MG/ML
4 INJECTION INTRAMUSCULAR; INTRAVENOUS EVERY 6 HOURS PRN
Status: DISCONTINUED | OUTPATIENT
Start: 2019-02-19 | End: 2019-02-22 | Stop reason: HOSPADM

## 2019-02-19 RX ORDER — NALOXONE HYDROCHLORIDE 0.4 MG/ML
.1-.4 INJECTION, SOLUTION INTRAMUSCULAR; INTRAVENOUS; SUBCUTANEOUS
Status: CANCELLED | OUTPATIENT
Start: 2019-02-19

## 2019-02-19 RX ORDER — LISINOPRIL 20 MG/1
20 TABLET ORAL DAILY
Status: CANCELLED | OUTPATIENT
Start: 2019-02-19

## 2019-02-19 RX ORDER — TIZANIDINE 2 MG/1
2 TABLET ORAL 3 TIMES DAILY PRN
Status: CANCELLED | OUTPATIENT
Start: 2019-02-19

## 2019-02-19 RX ORDER — ALBUTEROL SULFATE 90 UG/1
2 AEROSOL, METERED RESPIRATORY (INHALATION) EVERY 6 HOURS PRN
Status: DISCONTINUED | OUTPATIENT
Start: 2019-02-19 | End: 2019-02-22 | Stop reason: HOSPADM

## 2019-02-19 RX ORDER — ONDANSETRON 4 MG/1
4 TABLET, ORALLY DISINTEGRATING ORAL EVERY 6 HOURS PRN
Status: CANCELLED | OUTPATIENT
Start: 2019-02-19

## 2019-02-19 RX ORDER — ONDANSETRON 4 MG/1
4 TABLET, ORALLY DISINTEGRATING ORAL EVERY 6 HOURS PRN
Status: DISCONTINUED | OUTPATIENT
Start: 2019-02-19 | End: 2019-02-22 | Stop reason: HOSPADM

## 2019-02-19 RX ORDER — FUROSEMIDE 40 MG
40 TABLET ORAL EVERY MORNING
Status: DISCONTINUED | OUTPATIENT
Start: 2019-02-20 | End: 2019-02-22 | Stop reason: HOSPADM

## 2019-02-19 RX ORDER — GADOBUTROL 604.72 MG/ML
10 INJECTION INTRAVENOUS ONCE
Status: DISCONTINUED | OUTPATIENT
Start: 2019-02-19 | End: 2019-02-19

## 2019-02-19 RX ORDER — NALOXONE HYDROCHLORIDE 0.4 MG/ML
.1-.4 INJECTION, SOLUTION INTRAMUSCULAR; INTRAVENOUS; SUBCUTANEOUS
Status: DISCONTINUED | OUTPATIENT
Start: 2019-02-19 | End: 2019-02-21

## 2019-02-19 RX ORDER — PROCHLORPERAZINE MALEATE 5 MG
5 TABLET ORAL EVERY 6 HOURS PRN
Status: DISCONTINUED | OUTPATIENT
Start: 2019-02-19 | End: 2019-02-22 | Stop reason: HOSPADM

## 2019-02-19 RX ORDER — HYDROMORPHONE HYDROCHLORIDE 1 MG/ML
0.5 INJECTION, SOLUTION INTRAMUSCULAR; INTRAVENOUS; SUBCUTANEOUS
Status: COMPLETED | OUTPATIENT
Start: 2019-02-19 | End: 2019-02-19

## 2019-02-19 RX ORDER — FERROUS SULFATE 325(65) MG
325 TABLET ORAL
Status: DISCONTINUED | OUTPATIENT
Start: 2019-02-20 | End: 2019-02-22 | Stop reason: HOSPADM

## 2019-02-19 RX ORDER — DONEPEZIL HYDROCHLORIDE 5 MG/1
5 TABLET, FILM COATED ORAL DAILY
Status: DISCONTINUED | OUTPATIENT
Start: 2019-02-19 | End: 2019-02-22 | Stop reason: HOSPADM

## 2019-02-19 RX ORDER — LISINOPRIL 20 MG/1
20 TABLET ORAL DAILY
Status: DISCONTINUED | OUTPATIENT
Start: 2019-02-19 | End: 2019-02-22 | Stop reason: HOSPADM

## 2019-02-19 RX ORDER — ASPIRIN 81 MG/1
81 TABLET, CHEWABLE ORAL ONCE
Status: COMPLETED | OUTPATIENT
Start: 2019-02-19 | End: 2019-02-19

## 2019-02-19 RX ADMIN — IPRATROPIUM BROMIDE AND ALBUTEROL SULFATE 3 ML: .5; 3 SOLUTION RESPIRATORY (INHALATION) at 11:22

## 2019-02-19 RX ADMIN — DONEPEZIL HYDROCHLORIDE 5 MG: 5 TABLET ORAL at 13:35

## 2019-02-19 RX ADMIN — MEMANTINE 5 MG: 5 TABLET ORAL at 13:57

## 2019-02-19 RX ADMIN — MICONAZOLE NITRATE: 2 POWDER TOPICAL at 20:40

## 2019-02-19 RX ADMIN — ACETAMINOPHEN 1000 MG: 500 TABLET, FILM COATED ORAL at 20:42

## 2019-02-19 RX ADMIN — TIZANIDINE 2 MG: 2 TABLET ORAL at 18:23

## 2019-02-19 RX ADMIN — OXYCODONE HYDROCHLORIDE 5 MG: 5 TABLET ORAL at 18:23

## 2019-02-19 RX ADMIN — Medication 0.5 MG: at 16:16

## 2019-02-19 RX ADMIN — ASPIRIN 81 MG 81 MG: 81 TABLET ORAL at 13:35

## 2019-02-19 RX ADMIN — GABAPENTIN 200 MG: 100 CAPSULE ORAL at 20:48

## 2019-02-19 RX ADMIN — FELODIPINE 10 MG: 5 TABLET, FILM COATED, EXTENDED RELEASE ORAL at 20:40

## 2019-02-19 RX ADMIN — HUMAN ALBUMIN MICROSPHERES AND PERFLUTREN 2 ML: 10; .22 INJECTION, SOLUTION INTRAVENOUS at 13:51

## 2019-02-19 RX ADMIN — ACETAMINOPHEN 650 MG: 325 TABLET, FILM COATED ORAL at 17:44

## 2019-02-19 RX ADMIN — Medication 3 MG: at 20:47

## 2019-02-19 RX ADMIN — ONDANSETRON 4 MG: 2 INJECTION INTRAMUSCULAR; INTRAVENOUS at 06:27

## 2019-02-19 RX ADMIN — SODIUM CHLORIDE, POTASSIUM CHLORIDE, SODIUM LACTATE AND CALCIUM CHLORIDE 500 ML: 600; 310; 30; 20 INJECTION, SOLUTION INTRAVENOUS at 06:25

## 2019-02-19 RX ADMIN — ATORVASTATIN CALCIUM 40 MG: 20 TABLET, FILM COATED ORAL at 13:35

## 2019-02-19 RX ADMIN — FUROSEMIDE 40 MG: 10 INJECTION, SOLUTION INTRAVENOUS at 11:27

## 2019-02-19 RX ADMIN — Medication 0.5 MG: at 06:29

## 2019-02-19 RX ADMIN — IPRATROPIUM BROMIDE AND ALBUTEROL SULFATE 3 ML: .5; 3 SOLUTION RESPIRATORY (INHALATION) at 21:23

## 2019-02-19 RX ADMIN — ISOSORBIDE MONONITRATE 30 MG: 30 TABLET, EXTENDED RELEASE ORAL at 13:35

## 2019-02-19 RX ADMIN — LISINOPRIL 20 MG: 20 TABLET ORAL at 13:35

## 2019-02-19 RX ADMIN — Medication 0.5 MG: at 13:35

## 2019-02-19 RX ADMIN — PREDNISONE 40 MG: 20 TABLET ORAL at 11:27

## 2019-02-19 ASSESSMENT — ENCOUNTER SYMPTOMS
DYSURIA: 0
TROUBLE SWALLOWING: 0
CHILLS: 0
DIAPHORESIS: 0
WOUND: 1
SHORTNESS OF BREATH: 0
COUGH: 0
SPEECH DIFFICULTY: 0
SORE THROAT: 0
APPETITE CHANGE: 0
VOMITING: 0
CONFUSION: 0
VOICE CHANGE: 0
CHEST TIGHTNESS: 0
NAUSEA: 1
BACK PAIN: 1
DIZZINESS: 1
FEVER: 0
NUMBNESS: 1
HEADACHES: 0
WEAKNESS: 1
LIGHT-HEADEDNESS: 1
ABDOMINAL PAIN: 0
FATIGUE: 0

## 2019-02-19 ASSESSMENT — ACTIVITIES OF DAILY LIVING (ADL)
FALL_HISTORY_WITHIN_LAST_SIX_MONTHS: NO
COGNITION: 0 - NO COGNITION ISSUES REPORTED
ADLS_ACUITY_SCORE: 18
ADLS_ACUITY_SCORE: 18
TRANSFERRING: 1-->ASSISTIVE EQUIPMENT
SWALLOWING: 0-->SWALLOWS FOODS/LIQUIDS WITHOUT DIFFICULTY
TOILETING: 3-->ASSISTIVE EQUIPMENT AND PERSON
DRESS: 2-->ASSISTIVE PERSON
RETIRED_EATING: 0-->INDEPENDENT
BATHING: 2-->ASSISTIVE PERSON
ADLS_ACUITY_SCORE: 18
RETIRED_COMMUNICATION: 0-->UNDERSTANDS/COMMUNICATES WITHOUT DIFFICULTY
AMBULATION: 1-->ASSISTIVE EQUIPMENT

## 2019-02-19 ASSESSMENT — MIFFLIN-ST. JEOR: SCORE: 1349.13

## 2019-02-19 NOTE — PROGRESS NOTES
Skin affirmation note    Admitting nurse completed full skin assessment, Will score and Will interventions. This writer agrees with the initial skin assessment findings.

## 2019-02-19 NOTE — PROGRESS NOTES
02/19/19 1100   Quick Adds   Type of Visit Initial Occupational Therapy Evaluation   Living Environment   Lives With alone   Living Arrangements apartment;assisted living   Self-Care   Usual Activity Tolerance moderate   Current Activity Tolerance poor   Activity/Exercise/Self-Care Comment currently getting home OT and PT.    Functional Level   Ambulation 1-->assistive equipment  (FWW within apartment. staff pushes pt in w/c for long )   Transferring 1-->assistive equipment   Toileting 3-->assistive equipment and person   Bathing 3-->assistive equipment and person   Dressing 1-->assistive equipment   Eating 0-->independent   Communication 0-->understands/communicates without difficulty   Swallowing 0-->swallows foods/liquids without difficulty   Cognition 0 - no cognition issues reported   Fall history within last six months no   Which of the above functional risks had a recent onset or change? none   Prior Functional Level Comment staff assists pt in and out of bed.    General Information   Onset of Illness/Injury or Date of Surgery - Date 02/19/19   Referring Physician Lincoln Brady MD   Patient/Family Goals Statement To return home. decrease dizziness.    Additional Occupational Profile Info/Pertinent History of Current Problem Jazmin Clifton is a 86 year old female from a nursing home presenting for evaluation of some dizziness this morning.  Patient reportedly awoke from sleep feeling very dizzy and nauseated.  Patient has history of coronary disease, hypertension, and previous hemorrhagic stroke status post surgical repair (no known baseline deficit), dementia, and chronic kidney   General Info Comments does not have home O2 at baseline. Currently on 4L O2 via Oxymask.    Cognitive Status Examination   Level of Consciousness alert   Pain Assessment   Patient Currently in Pain Yes, see Vital Sign flowsheet  (acute/chronic back pain and R hip pain. )   Range of Motion (ROM)   ROM Comment limited B UE  shoulder ROM; FF= about 75 degrees. elbow ROM: WNL   Strength   Strength Comments B UE strength: grossly 4/5. generalized weakness noted throughout therapy session. Fatigues with little activity. B LE's become tremulous with EOB to commode transfer.    Hand Strength   Hand Strength Comments B  strength: WFL   Transfer Skills   Transfer Comments supine <> sit Mod A with use of bed rail and HOB elevated.    Transfer Skill: Sit to Stand   Level of [place]: Sit/Stand contact guard   Physical Assist/Nonphysical Assist: Sit/Stand 1 person assist   Assistive Device for Transfer: Sit/Stand standard walker   Transfer Skill: Toilet Transfer   Level of [place]: Toilet contact guard   Physical Assist/Nonphysical Assist: Toilet 1 person assist   Assistive Device standard walker   Toilet Transfer Skill Comments on/off commode- declines walking to bathroom.    Upper Body Dressing   Level of [place]: Dress Upper Body independent   Lower Body Dressing   Level of [place]: Dress Lower Body maximum assist (25% patients effort)   Physical Assist/Nonphysical Assist: Dress Lower Body 1 person assist   Assistive Device (does not have baseline AE in room- to assess tomorrow )   Eating/Self Feeding   Level of [place]: Eating independent   Instrumental Activities of Daily Living (IADL)   IADL Comments facility provides meals. staff pushes pt down to dining room in w/c,    Activities of Daily Living Analysis   Impairments Contributing to Impaired Activities of Daily Living pain;strength decreased   ADL Comments dizziness   General Therapy Interventions   Planned Therapy Interventions ADL retraining;strengthening;progressive activity/exercise   Clinical Impression   Criteria for Skilled Therapeutic Interventions Met yes, treatment indicated   OT Diagnosis decreased independence with ADLs and functional mobility    Influenced by the following impairments generalized weakness, back/hip pain, dizziness   Assessment of  "Occupational Performance 3-5 Performance Deficits   Identified Performance Deficits dressing, toileting, bathing, functional mobility    Clinical Decision Making (Complexity) Low complexity   Therapy Frequency daily   Predicted Duration of Therapy Intervention (days/wks) 2-3 days   Anticipated Discharge Disposition Home with Assist;Transitional Care Facility  (TERRA)   Risks and Benefits of Treatment have been explained. Yes   Patient, Family & other staff in agreement with plan of care Yes   [facility] AM-PAC  \"6 Clicks\" Daily Activity Inpatient Short Form   1. Putting on and taking off regular lower body clothing? 2 - A Lot   2. Bathing (including washing, rinsing, drying)? 2 - A Lot   3. Toileting, which includes using toilet, bedpan or urinal? 3 - A Little   4. Putting on and taking off regular upper body clothing? 4 - None   5. Taking care of personal grooming such as brushing teeth? 4 - None   6. Eating meals? 4 - None   Daily Activity Raw Score (Score out of 24.Lower scores equate to lower levels of function) 19   Total Evaluation Time   Total Evaluation Time (Minutes) 8     "

## 2019-02-19 NOTE — ED NOTES
Family stepped out of room pt SaO2 dropped while sleeping- easy to awaken   Place don oxymask due to nasal congestion and pt relaxed/sleepy

## 2019-02-19 NOTE — PROGRESS NOTES
02/19/19 1500   Signing Clinician's Name / Credentials   Signing clinician's name / credentials Honey Espinoza, PT   Quick Adds   Rehab Discipline PT   Additional Documentation   PT Plan cancel-  PT and Lymphedema  Orders received, pt not seen today- pt declining today  because of c/o dizziness, but agreeable to starting on 2/20/19

## 2019-02-19 NOTE — ED PROVIDER NOTES
History     Chief Complaint   Patient presents with     Generalized Weakness     multiple complaints     HPI  Jazmin Clifton is a 86 year old female from a nursing home presenting for evaluation of some dizziness this morning.  Patient reportedly awoke from sleep feeling very dizzy and nauseated.  Patient has history of coronary disease, hypertension, and previous hemorrhagic stroke status post surgical repair (no known baseline deficit), dementia, and chronic kidney.  Initial history from EMS was vague with nonspecific symptoms and medical history provided from the nursing home.  After chart review and repeat exam and history, patient began to report a sensation of facial drooping on the left which she states is new.  Denies any difficulties with speaking or swallowing.  Denies any numbness, tingling, or weakness in her extremities.  Does report chronic neck and back pain as well as chronic lower extremity pain which is unchanged from baseline.  Denies any vision changes however she reports her dizziness and vertigo symptoms dramatically worsen with her eyes open.  Patient reports she was feeling well before bedtime tonight    Allergies:  Allergies   Allergen Reactions     Accupril      Ace Inhibitors Unknown     Accupril     Augmentin Unknown     Levofloxacin Unknown, Muscle Pain (Myalgia) and Other (See Comments)     Comment: myalgias, Description:   Pt prescribed ciprofloxacin in Jan 2018 (no rxn documented)  Myalgias       Morphine Other (See Comments)     hallucinations     Nitrofurantoin Nausea and Vomiting and Unknown     Norvasc [Amlodipine Besylate]      Leg swelling       Quinapril Other (See Comments) and Unknown     Hypertension. 3.29.18 - Takes and tolerates lisinopril  Patient reports HTN with as reaction to this medication         Problem List:    Patient Active Problem List    Diagnosis Date Noted     Health Care Home 02/13/2019     Priority: [facility] Care Coordinator  Mirian  "PRAVEENA Weldon, LSW  [phone]    [facility] CARE PLAN SUMMARY    Member Name:  Jazmin Clifton  Address:  [address] [place]  [address] [facility] [address] Phone: [phone] (home)    :  1932 Date of Assessment:     Health Plan:  [facility]  Health Plan Number: [id number] Medical Assistance Number: [id number]  Financial Worker:    Case #:     FVP Care Coordinator:  Mirian Weldon MA, LSW CC Phone:  [phone]  CC Fax:  [phone]   FVP Enrollment Date: 19 Case Mix:    Rate Cell:    Waiver Type:     Emergency Contact:  Extended Emergency Contact Information  Primary Emergency Contact: Chi Clifton  Address: [address] [place] [address] [facility]  Mobile Phone: [phone]  Relation: Son  Secondary Emergency Contact: Lucius Clifton  Address: [address] [place] [address] [facility]  Home Phone: [phone]  Work Phone: [phone]  Relation: Son Language:  English  :  No   Health Care Agent/POA:   Advanced Directives/Living Will:     Primary Care Clinic/Phone/Fax:  [facility]/(p) [phone], (f) [phone] Primary Dx:    Secondary Dx:     Primary Physician:  Junie Estrella   Height:  5' 3\"  Weight:  194 lbs   Specialty Physician:    Audiologist:     Eye Care Provider:   Dental Care Provider:    [facility] [phone]   Other:             Morbid obesity (H) 2018     Priority: Medium     Mild cognitive impairment 2017     Priority: Medium     S/P carotid endarterectomy 2017     Priority: Medium     Carotid stenosis, symptomatic w/o infarct, right 2017     Priority: Medium     Thyroid nodule 2017     Priority: Medium     Trigger point of extremity 2017     Priority: Medium     Trochanteric bursitis of right hip 2017     Priority: Medium     Urinary tract infection without hematuria, site unspecified 2017     Priority: Medium     CKD " (chronic kidney disease) stage 3, GFR 30-59 ml/min (H) 08/16/2017     Priority: Medium     ACP (advance care planning) 08/03/2017     Priority: Medium     8/3/2017  Recommend Code Status in chart and patient's Advance Care Planning documents be reviewed to ensure alignment with patient's current wishes.  Most recent Advance Care Planning document is a POLST dated 7/24/17 indicating DNI.  Agnieszka Santillan,  Advance Care Planning Liaison           Transient cerebral ischemia, unspecified type 08/01/2017     Priority: Medium     Carotid artery stenosis, symptomatic, right 08/01/2017     Priority: Medium     CVA (cerebral vascular accident) (H) 07/26/2017     Priority: Medium     Chronic obstructive pulmonary disease, unspecified COPD type (H) 07/25/2017     Priority: Medium     Generalized muscle weakness 07/25/2017     Priority: Medium     Dizziness 07/25/2017     Priority: Medium     Osteoarthritis of right hip, unspecified osteoarthritis type 07/25/2017     Priority: Medium     Hypotension, unspecified hypotension type 07/25/2017     Priority: Medium     Hip joint replacement status 04/18/2012     Priority: Medium     Osteoarthritis 04/18/2012     Priority: Medium     DDD (degenerative disc disease), lumbar 04/18/2012     Priority: Medium     Mild major depression (H) 04/18/2012     Priority: Medium     Incontinence of urine 04/18/2012     Priority: Medium     Osteoporosis 10/25/2011     Priority: Medium     S/P laminectomy 10/25/2011     Priority: Medium     CARDIOVASCULAR SCREENING; LDL GOAL LESS THAN 100 10/31/2010     Priority: Medium     CHF (congestive heart failure) (H) 09/17/2008     Priority: Medium     Diastolic disfunction - ECHP 2008       Restrictive lung disease 09/17/2008     Priority: Medium     PFT 4/2008       Renovascular hypertension 11/09/2006     Priority: Medium     Problem list name updated by automated process. Provider to review       Benign neoplasm of colon 10/04/2006     Priority: Medium      Angiodysplasia - due for repeat 10 years.  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 NO edema. Continue current Therapy.HYPERTENSION: Dynacirc CR 10mg daily initiated today. Will continue adjusting blood pressure medicatins as needed until readings at target.VITAMIN D  DEFICIENCY - Completed therapy, discontinue ergocalciferol.ANEMIA, EPO DEFICIENCY - Hgb 10.2. Not on EPO. Continue observation for now. Recnet Hgb drop due to lumbar laminectomy.  Problem list name updated by automated process. Provider to review       Essential hypertension, benign 05/01/2006     Priority: Medium     Atherosclerosis of renal artery (H)      Priority: Medium     Left renal artery stenosis       Esophageal reflux      Priority: Medium     Gastroesophageal Reflux Disease       Cerebral aneurysm, nonruptured      Priority: Medium     Cerebral Aneurysm - unchanged on rcent MRI.  Seen by neurosurg.  [place] she should have follow up MRA every 2 years (due 2010)       Other specified cardiac dysrhythmias(427.89)      Priority: Medium     Bradycardia, improved on lower dose beta blockers           Past Medical History:    Past Medical History:   Diagnosis Date     ABDOMINAL PAIN GENERALIZED 3/15/2006     Abdominal pain, generalized 3/15/2006     Atherosclerosis of renal artery (H)      BENIGN HYPERTENSION 5/1/2006     Benign neoplasm of scalp and skin of neck      Cerebral aneurysm, nonruptured      Depressive disorder, not elsewhere classified      Esophageal reflux      Female stress incontinence      Gastrointestinal malfunction arising from mental factors       Generalized osteoarthrosis, unspecified site      Herpes zoster dermatitis of eyelid      Insomnia, unspecified      Lumbago      Other chest pain      Other specified cardiac dysrhythmias(427.89)      Rectocele      Unspecified disorder of kidney and ureter      Unspecified essential hypertension        Past Surgical History:    Past Surgical History:   Procedure Laterality Date     CHOLECYSTECTOMY, LAPOROSCOPIC      Cholecystectomy, Laparoscopic     ENDARTERECTOMY CAROTID Right 2017    Procedure: ENDARTERECTOMY CAROTID;  RIGHT CAROTID ENDARTERECTOMY WITH EEG;  Surgeon: Hilario Parry MD;  Location: [facility] OR     HYSTERECTOMY, RAYMOND      oophorectomy,RAYMOND     SURGICAL HISTORY OF -       Laminectomy x 3     SURGICAL HISTORY OF -       MCA Aneurysm repair     SURGICAL HISTORY OF -       Bladder suspension (Bladder Repair x2)     SURGICAL HISTORY OF -       Bilateral Hand Surgeries for Arthritis x2     SURGICAL HISTORY OF -       Repair of a Cerebral Aneurysm       Family History:    Family History   Problem Relation Age of Onset     Cancer Mother         stomache     Cerebrovascular Disease Father      Heart Disease Father      Cancer Brother         lymphoma     Eye Disorder Son      Asthma Son      Diabetes Son      Gastrointestinal Disease Son         no control over bladder or bowels     C.A.D. No family hx of      Hypertension No family hx of      Breast Cancer No family hx of      Cancer - colorectal No family hx of      Prostate Cancer No family hx of        Social History:  Marital Status:   [5]  Social History     Tobacco Use     Smoking status: Former Smoker     Last attempt to quit: 1992     Years since quittin.1   Substance Use Topics     Alcohol use: Yes     Comment: wine occas.     Drug use: No        Medications:      acetaminophen (TYLENOL) 325 MG tablet   Alendronate Sodium (FOSAMAX PO)   Ascorbic Acid (VITAMIN C PO)   aspirin 81 MG chewable tablet   atorvastatin  (LIPITOR) 40 MG tablet   CALCIUM 600+D 600-200 MG-UNIT TABS   calcium carbonate (TUMS) 500 MG chewable tablet   cephALEXin (KEFLEX) 500 MG capsule   DONEPEZIL HCL PO   DULoxetine HCl (CYMBALTA PO)   ferrous sulfate (FEROSUL) 325 (65 Fe) MG tablet   fluticasone-vilanterol (BREO ELLIPTA) 100-25 MCG/INH oral inhaler   Furosemide (LASIX PO)   GABAPENTIN PO   GABAPENTIN PO   guaiFENesin (ROBITUSSIN) 100 MG/5ML SYRP   HYDROcodone-acetaminophen (NORCO) 5-325 MG tablet   HYDROcodone-acetaminophen (NORCO) 5-325 MG tablet   hypromellose-dextran (ARTIFICAL TEARS) 0.1-0.3 % ophthalmic solution   isosorbide mononitrate (IMDUR) 30 MG 24 hr tablet   isradipine (DYNACIRC) 5 MG capsule   levalbuterol (XOPENEX) 1.25 MG/3ML neb solution   lisinopril (PRINIVIL/ZESTRIL) 10 MG tablet   MAGNESIUM OXIDE PO   melatonin 3 MG CAPS   memantine (NAMENDA) 5 MG tablet   Menthol, Topical Analgesic, (BIOFREEZE) 4 % GEL   nystatin (MYCOSTATIN) 809063 UNIT/GM POWD   OMEPRAZOLE PO   senna-docusate (SENOKOT-S/PERICOLACE) 8.6-50 MG tablet   tiZANidine (ZANAFLEX) 2 MG tablet         Review of Systems   Constitutional: Negative for appetite change, chills, diaphoresis, fatigue and fever.   HENT: Negative for congestion, sore throat, trouble swallowing and voice change.         Abnormal metallic taste in mouth   Eyes: Negative for visual disturbance.   Respiratory: Negative for cough, chest tightness and shortness of breath.    Cardiovascular: Negative for chest pain.   Gastrointestinal: Positive for nausea. Negative for abdominal pain and vomiting.   Genitourinary: Negative for dysuria.   Musculoskeletal: Positive for back pain (chronic).   Skin: Positive for wound (b/l legs).   Neurological: Positive for dizziness, weakness (left face), light-headedness and numbness (face, left sided). Negative for syncope, speech difficulty and headaches.   Psychiatric/Behavioral: Negative for confusion.   All other systems reviewed and are negative.      Physical Exam    BP: 175/88  Pulse: 66  Temp: 97.7  F (36.5  C)  Resp: 18  Weight: 90.7 kg (200 lb)  SpO2: 95 %      Physical Exam   Constitutional: She is oriented to person, place, and time. She appears well-developed and well-nourished. No distress.   HENT:   Head: Normocephalic and atraumatic.   Moderately dry mucous membranes   Eyes: Conjunctivae and EOM are normal.   Pupils 2 mm bilaterally   Cardiovascular: Normal rate and regular rhythm.   Pulmonary/Chest: Effort normal and breath sounds normal.   Abdominal: Soft. She exhibits no distension. There is no tenderness.   Obese   Neurological: She is alert and oriented to person, place, and time. A cranial nerve deficit (left facial weakness) is present. No sensory deficit.   No upper extremity drift.  Lower extremities able to lift against gravity, symmetric.   Skin: Skin is warm and dry. Capillary refill takes less than 2 seconds. She is not diaphoretic.   Psychiatric: She has a normal mood and affect.   Nursing note and vitals reviewed.      ED Course        Procedures               EKG Interpretation:      Interpreted by Sanchez Zuniga  Time reviewed: 0618  Symptoms at time of EKG: weakness, dizziness   Rhythm: Sinus rhythm  Rate: Normal  Axis: Normal  Ectopy: none  Conduction: normal  ST Segments/ T Waves: No acute ischemic changes  Q Waves: Lead III and aVF  Comparison to prior: Similar morphology to previous EKG dated August 31, 2017    Clinical Impression: Sinus rhythm without acute ischemic abnormality or arrhythmia              Critical Care Addendum    My initial assessment, based on my review of prehospital provider report, review of nursing observations, review of vital signs, focused history and physical exam, established that Jazmin Clifton has focal neurologic abnormalities, which requires immediate intervention, and therefore She is critically ill.     After the initial assessment, the care team initiated multiple lab tests, initiated IV fluid  administration and performed CT of her head to provide stabilization care. Due to the critical nature of this patient, I reassessed nursing observations, vital signs, physical exam, mental status and neurologic status multiple times prior to She disposition.     Time also spent performing documentation, discussion with family to obtain medical information for decision making, reviewing test results, discussion with consultants and coordination of care.     Critical care time (excluding teaching time and procedures): 35 minutes.        The patient has stroke symptoms:           ED Stroke specific documentation           NIHSS PDF          Protocol PDF     Patient last known well time: 2100 yesterday evening  ED Provider first to bedside at: Upon EMS arrival  CT Results received at: 0607  Patient was not treated with TPA due to the following reason(s):  Patient (or their surrogate decision maker) refused TPA treatment after discussion of risks and benefits.  Mild stroke symptoms ( NIHSS < 4 and not globally aphasic)  Rapidly improving symptoms    National Institutes of Health Stroke Scale (Baseline)  Time Performed: 0540      Score    Level of consciousness: (0)   Alert, keenly responsive    LOC questions: (0)   Answers both questions correctly    LOC commands: (0)   Performs both tasks correctly    Best gaze: (0)   Normal    Visual: (0)   No visual loss    Facial palsy: (1)   Minor paralysis (flat nasolabial fold, smile asymmetry)    Motor arm (left): (0)   No drift    Motor arm (right): (0)   No drift    Motor leg (left): (2)   Some effort against gravity    Motor leg (right): (2)   Some effort against gravity    Limb ataxia: (0)   Absent    Sensory: (0)   Normal- no sensory loss    Best language: (0)   Normal- no aphasia    Dysarthria: (0)   Normal    Extinction and inattention: (0)   No abnormality        Total Score:  5 (lower leg drift all chronic)        Stroke Mimics were considered (including migraine  headache, seizure disorder, hypoglycemia (or hyperglycemia), head or spinal trauma, CNS infection, Toxin ingestion and shock state (e.g. sepsis) .      Evaluation/Treatement was delayed due to: Slightly delayed initiation of CT due to non-specific symptoms and unclear if stroke evaluation initially warranted.                   Results for orders placed or performed during the hospital encounter of 02/19/19 (from the past 24 hour(s))   CBC with platelets differential   Result Value Ref Range    WBC 4.6 4.0 - 11.0 10e9/L    RBC Count 3.77 (L) 3.8 - 5.2 10e12/L    Hemoglobin 10.2 (L) 11.7 - 15.7 g/dL    Hematocrit 34.8 (L) 35.0 - 47.0 %    MCV 92 78 - 100 fl    MCH 27.1 26.5 - 33.0 pg    MCHC 29.3 (L) 31.5 - 36.5 g/dL    RDW 18.7 (H) 10.0 - 15.0 %    Platelet Count 228 150 - 450 10e9/L    Diff Method Automated Method     % Neutrophils 63.1 %    % Lymphocytes 21.0 %    % Monocytes 11.8 %    % Eosinophils 3.3 %    % Basophils 0.4 %    % Immature Granulocytes 0.4 %    Nucleated RBCs 0 0 /100    Absolute Neutrophil 2.9 1.6 - 8.3 10e9/L    Absolute Lymphocytes 1.0 0.8 - 5.3 10e9/L    Absolute Monocytes 0.5 0.0 - 1.3 10e9/L    Absolute Eosinophils 0.2 0.0 - 0.7 10e9/L    Absolute Basophils 0.0 0.0 - 0.2 10e9/L    Abs Immature Granulocytes 0.0 0 - 0.4 10e9/L    Absolute Nucleated RBC 0.0    INR   Result Value Ref Range    INR 1.09 0.86 - 1.14   Partial thromboplastin time   Result Value Ref Range    PTT 33 22 - 37 sec   Troponin I   Result Value Ref Range    Troponin I ES <0.015 0.000 - 0.045 ug/L   Comprehensive metabolic panel   Result Value Ref Range    Sodium 142 133 - 144 mmol/L    Potassium 3.9 3.4 - 5.3 mmol/L    Chloride 106 94 - 109 mmol/L    Carbon Dioxide 31 20 - 32 mmol/L    Anion Gap 5 3 - 14 mmol/L    Glucose 92 70 - 99 mg/dL    Urea Nitrogen 26 7 - 30 mg/dL    Creatinine 1.53 (H) 0.52 - 1.04 mg/dL    GFR Estimate 30 (L) >60 mL/min/[1.73_m2]    GFR Estimate If Black 35 (L) >60 mL/min/[1.73_m2]    Calcium 8.5 8.5  - 10.1 mg/dL    Bilirubin Total 0.4 0.2 - 1.3 mg/dL    Albumin 3.1 (L) 3.4 - 5.0 g/dL    Protein Total 6.0 (L) 6.8 - 8.8 g/dL    Alkaline Phosphatase 69 40 - 150 U/L    ALT 15 0 - 50 U/L    AST 17 0 - 45 U/L   CT Head w/o Contrast    Narrative    CT SCAN OF THE HEAD WITHOUT CONTRAST  2/19/2019 5:59 AM     HISTORY: Focal neuro deficit less than six hours duration, stroke  suspected. Left facial droop. Dizziness/Vertigo.    TECHNIQUE: Axial images of the head and coronal reformations without  IV contrast material. Radiation dose for this scan was reduced using  automated exposure control, adjustment of the mA and/or kV according  to patient size, or iterative reconstruction technique.    COMPARISON: 7/26/2017.    FINDINGS: There is generalized atrophy of the brain. There is low  attenuation in the white matter of the cerebral hemispheres consistent  with sequelae of small vessel ischemic disease. There is no evidence  of intracranial hemorrhage, mass, acute infarct or anomaly.  Postoperative change in the right temporal lobe.    The visualized portions of the sinuses and mastoids appear normal.  There is no evidence of trauma.      Impression    IMPRESSION: No acute abnormality.    REBECCA WILLS MD       Medications   0.9% sodium chloride BOLUS (not administered)   iopamidol (ISOVUE-370) solution 70 mL (not administered)   sodium chloride 0.9 % bag 500mL for CT scan flush use (not administered)   HYDROmorphone (PF) (DILAUDID) injection 0.5 mg (0.5 mg Intravenous Given 2/19/19 0629)   lactated ringers BOLUS 500 mL (500 mLs Intravenous New Bag 2/19/19 0625)   ondansetron (ZOFRAN) injection 4 mg (4 mg Intravenous Given 2/19/19 0627)             6:07 AM: Discussed with Dr Encinas, radiology. No acute findings and unchanged from previous. Pt's GFR very low. Discussed whether to perform the angiogram not given her low GFR.  Given her mild deficit with symptoms of mild facial droop and dizziness only, we decided to hold  off on the angios for now.  Given her dizziness predominant symptoms, this is more likely to represent a posterior stroke for which an MRI would be better at evaluating.    6:43 AM: I had a prolonged discussion with the patient and family.  Patient is amenable to further workup to evaluate for possible stroke including ultrasound of the carotids.  Overall symptoms have been improving with near resolution of her dizziness and nausea.  Able to open her eyes now and look around.  Patient feels the weakness and numbness of her face has improved as well.  Family report they cannot appreciate any facial droop at this time. Paged hospitalist for admission.    6:57 AM: Discussed with Marylu. Accepts for admission.      Assessments & Plan (with Medical Decision Making)  86-year-old female with history of hypertension, hyperlipidemia, and previous strokes presenting for evaluation of acute onset of dizziness with nausea and left facial droop.  Initial noncontrast CT negative.  CTA not performed due to renal insufficiency and minor strokelike symptoms making the relative risk versus benefit too high.  Symptoms also rapidly improving with near resolution of her symptoms upon arrival back from CT.  Discussed risk and benefits of further workup and patient and family would like further workup.  She does have a history of previous cerebral aneurysm so is at high risk for anticoagulation but she reports that she believes her clips are MRI safe and she has had multiple MRIs in the past.  Has not had imaging of the carotids for several years.  Not in A. fib on EKG here.  Patient to be admitted to the hospital for further evaluation and workup.     I have reviewed the nursing notes.    I have reviewed the findings, diagnosis, plan and need for follow up with the patient.          Medication List      There are no discharge medications for this visit.         Final diagnoses:   Dizziness   Facial droop   Anemia, unspecified type   Renal  insufficiency       2/19/2019   [facility] EMERGENCY DEPARTMENT     Zuniga, Sanchez Moore MD  02/19/19 0697

## 2019-02-19 NOTE — PROGRESS NOTES
2-19-19   CC received notice that member is currently at [facility]. CC reached out to discharge office and talked with SEVERO Torres.  Melissa stated that member will be there for a few days and then anticipated discharge back to [facility].  CC asked for update and follow up as needed.  CC then followed up with RN staff at [facility] and left VM stating that CC was now involved in members care and asked for any updates as well after she is back at the [facility].   Mirian eWldon MA [facility] Care Coordinator   [phone]

## 2019-02-19 NOTE — PROGRESS NOTES
Discharge Planner   Discharge Plans in progress: Return to [facility] (phone: [phone] Fax: [phone])  Barriers to discharge plan: Clinical improvement  Follow up plan: Referral for Clinic Care Coordination. Follow up with PCP.       Entered by: Melissa Newby 02/19/2019 11:58 AM

## 2019-02-19 NOTE — LETTER
Transition Communication Hand-off for Care Transitions to Next Level of Care Provider    Name: Jazmin Clifton  : 1932  MRN #: [id number]  Primary Care Provider: Junie Estrella  Primary Care MD Name: Taylor Estrella  Primary Clinic: [address] [place] [id number]  Primary Care Clinic Name: [facility]  Reason for Hospitalization:  Dizziness [R42]  Facial droop [R29.810]  Renal insufficiency [N28.9]  Anemia, unspecified type [D64.9]  Admit Date/Time: 2019  5:32 AM  Discharge Date: 19  Payor Source: Payor: MEDICA / Plan: [employer] / Product Type: HMO /     Readmission Assessment Measure (LINN) Risk Score/category: Average        Reason for Communication Hand-off Referral: discharge to a TCU.    [facility]Phone: [phone] Fax: [phone])       Concern for non-adherence with plan of care:   Y/N No  Discharge Needs Assessment:  Needs      Most Recent Value   Assisted Living  [facility] [phone], Fax: [phone]   Skilled Nursing Facility  -- [[facility]]   PAS Number  -- [[id number]]        Follow-up plan:  No future appointments.        Referrals     Future Labs/Procedures    LYMPHEDEMA THERAPY REFERRAL     Process Instructions:    *This therapy referral will be filtered to a centralized scheduling office at [facility] and the patient will receive a call to schedule an appointment at a [facility] location most convenient for them. *    Comments:    If you have not heard from the scheduling office within 2 business days, please call [phone] for all locations, with the exception of [place], please call [phone] and [place], please call [phone].    Please be aware that coverage of these services is subject to the terms and limitations of your health insurance plan.  Call member services at your health plan with any benefit or coverage questions.     Occupational Therapy Adult Consult     Comments:    Evaluate and treat as clinically indicated.    Reason:  Weakness    Physical Therapy Adult Consult     Comments:    Evaluate and treat as clinically indicated.    Reason:  Weakness            Key Recommendations:  The patient lives at [facility] (phone: [phone] Fax: [phone]). Patient reports she receives assistance with wheelchair transport to meals, med administration and dressing. She ambulates with a walker. Patient is followed in the community by Mir[facility], [facility]. Updated Mirian with the discharge plan.          Melissa Newby RN Care Coordinator    AVS/Discharge Summary is the source of truth; this is a helpful guide for improved communication of patient story

## 2019-02-19 NOTE — ED NOTES
Pt requested and given water was able to swallow without difficulty   maintaining Sa02 with NC at mouth

## 2019-02-19 NOTE — CONSULTS
Care Transition Initial Assessment - RN      Met with: Patient and spoke with son.    DATA   Active Problems:    Cerebral aneurysm, nonruptured    Essential hypertension, benign    Chronic obstructive pulmonary disease, unspecified COPD type (H)    Dizziness    CVA (cerebral vascular accident) (H)    CKD (chronic kidney disease) stage 3, GFR 30-59 ml/min (H)    S/P carotid endarterectomy       Primary Care Clinic Name: [facility]  Primary Care MD Name: Taylor Estrella  Contact information and PCP information verified: Yes    ASSESSMENT  Cognitive Status: awake, alert and appropriate.       Resources List: Assisted Living      Description of Support System: Supportive, Involved   Who is your support system?: Children, Facility resident(s)/Staff   Support Assessment: Adequate family and caregiver support   Insurance Concerns: No Insurance issues identified      This writer met with pt and spoke with Gunner Lloyd with patient's permission, introduced self and role. The patient lives at [facility] (phone: [phone] Fax: [phone]). Patient reports she receives assistance with wheelchair transport to meals and dressing. She ambulates with a walker. Writer left voice mail for nurse at [facility]. Patient is followed in the community by [facility], [facility]. Updated Mirian with plan of care. Transportation will be provided by Gabino hunter upon discharge.    PLAN    Return to [facility]       Melissa Newby RN, Care Coordinator [phone]

## 2019-02-19 NOTE — PROGRESS NOTES
"[place] [facility] [place]    Patient admitted to room 2306 at approximately 0830 via cart from emergency room. Patient was accompanied by transport tech.     Verbal SBAR report received from Lovely ELKINS prior to patient arrival.     Patient trasferred to bed via air juanis. Patient alert and oriented X 3. Pain is not well controlled.  Medication(s) being used: .  . Admission vital signs: Blood pressure 167/49, pulse 64, temperature 95.7  F (35.4  C), temperature source Oral, resp. rate 16, height 1.6 m (5' 3\"), weight 94 kg (207 lb 3.7 oz), SpO2 99 %. Patient was oriented to plan of care, call light, bed controls, tv, telephone, bathroom and visiting hours.     Risk Assessment    The following safety risks were identified during admission: fall. Yellow risk band applied: YES.     Skin Initial Assessment    This writer admitted this patient and completed a full skin assessment and Will score in the Adult PCS flowsheet. Appropriate interventions initiated as needed.     Secondary skin check completed by Jocelyn ELKINS.         Nury Vieyra    "

## 2019-02-19 NOTE — PLAN OF CARE
Discharge Planner OT   Patient plan for discharge: return to [facility]    Current status: Eval complete. Today's session limited by dizziness and back/R hip pain. For toileting- pt declines walking to bathroom. Mod A for supine <> sit. CGA using FWW to transfer to/from commode. Mod A to pull down briefs and Max A to don new pair (d/t incontinence). Dizziness resolves with prolonged sitting prior to toileting.  Fatigues with little activity.     Barriers to return to prior living situations: dizziness, generalized weakness    Recommendations for discharge: Anticipate once dizziness resolves and pt increases activity with therapy pt would be appropriate to return to [facility] with continued services and continued home therapy     Rationale for recommendations: See above.        Entered by: Christine Cardona 02/19/2019 11:32 AM

## 2019-02-19 NOTE — H&P
Physician Attestation   I, Lincoln Brady, was present with the medical student who participated in the service and in the documentation of the note.  I have verified the history and personally performed the physical exam and medical decision making.  I agree with the assessment and plan of care as documented in the note.      I personally reviewed vital signs, medications, labs and imaging.    The exam and assessment/plan were done by me     Lincoln Brady MD  Date of Service (when I saw the patient): 02/19/19    [facility]    History and Physical       Date of Admission:  2/19/2019    Assessment & Plan   Jazmin Clifton is a 86 year old female admitted on 2/19/2019. She has a history of hypertension, COPD, congenital cystic kidney disease, obesity and is admitted for lightheadedness, weakness and potential right sided weakness.     Lightheadedness with potential right sided weakness and left sided facial weakness  ED physician saw left sided facial weakness but no right sided weakness, as noted from EMS.   Concern for stroke.   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 She thinks this got worse yesterday/this am. Family disagreed. History of Bilateral carotid stenosis with endarterectomy on the right. Bubble study done in end of 2017 with no signs of shunting.   CT was negative. On initial exam potentially increased left  sided face weakness. Later not noticeable.   It is very likely that an acute onset of systemic  disease (hypoxia) is causing her underlying motoric dysfunction from previous stroke to increase in severity.   Nonetheless stroke needs to be ruled out.   - MRI and MRA : pending   - Tele   - ECHO : pending       Acute Hypoxic resp failure   Patient woke up this am with significant shortness of breath. Short of breath even when sitting, but more severe on  exertion. Has known COPD and is previous smoker (quit 25 years ago). On exam bilateral coarse crackles and mild anterior wheezing. Needed 4l of O2 in the ED, O2 sats were 89% initially, recovered to high 90's.   DDx: HF vs Infex vs PE vs COPD exacerbation   PE can't be ruled out at the moment, but with previous CT and CKD, CT-A seems inadvisable at the time being.   CXR to check for fluid overload and labs to rule out infectious process. No fever, no WBC but respiratory distress, no cough and no phlegm. Infectious seems unlikely but needs to be r/o. Will start treating for COPD exacerbation.   - CXR : pending   - CRP, procalcitonin, BNP : pending   - vbg : pending   - steroids 40 mg x 5d + duonebs + albuterol prn   - one dose lasix 40 mg to see if significant diuresis while waiting for the above testing.        Bilateral leg edema   Patient presents with recent severe increase of swelling of both legs. She came to ED with wrapped legs from assisted living. She complains of blisters on the left foot, one broke couple of days ago. The swelling has been coming on for several weeks. She was put on Cephalexin on 02/15 for the open blister.  After unwrapping the legs, the ankles and shins looked fairly normal and according to the patient significantly better than before.   DDx: venous insufficiency vs CHF  Patient has history of heart failure in her chart, but normal echo in 2018 with normal pressure of IVC and normal EF. She is on 60 mg furosemide before hospitalization (40 mg in AM 20 mg in pm)   With crackles in lungs and increased sob with bilateral leg edema, decompensated HF sounds very possible   - ECHO   - BNP  - CXR   - hold abx and lymphedema consult ordered   - 40 mg iv furosemide once      Recent E. Coli positve urine culture  Patient had UC done on 02/09/19 and showed growth of E. Coli. When asked about urinary symptoms, she denies burning, changes in frequency, change in color or pain. She was put on  cephalexin for possible suspected cellulitis. Should cover for UTI as well. No signs of infection with normal WBC, temperature, but might contribute to weakness.   - recheck UA and UC       Anemia  Patient has low Hb dating back to 01/29/2016. She is on iron tablets. In the chart I couldn't find folic acid or iron studies. Vit b12 was checked and normal 10/2018.   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 This seems to be close to her baseline.   Caution with iv contrast or furosemide       Hypertension  Patient has chronic hypertension. She is on ACEi, furosemide and CCB. Since hospitalization her BP have been running > 165 systolic mmHg.   Could be possible that this is secondary HTN due to kidney disease with hypertension refractory to 3 medications.   - continue home meds and observe  - 40 mg furosemide injection once       Seroma of right groin  Patient presented to the ED 02/09/2019 for swelling of the right  groin. She was found to have seroma after initial surgery over a year ago with tumor and  lymph node resection. Subjectively got bigger and more tender, for which she came to the ED on 02/09/2019. She was advised to use a compressive garment (SPANKS) with which she has been noncompliant. Drainage was not advised as it would most likely reoccur.   - use a compressive garment (SPANKS)         Diet: Low Saturated Fat Na <2400 mg    Fluids: iv  DVT Prophylaxis: Anti-embolisim stockings (TEDs)  Maldonado Catheter: not present  Code Status: POLST 7/24/17 DNI    Disposition Plan   Change to IP for acute hypoxic resp failure. Expected discharge: 1-2 days , recommended to prior living arrangement once stable.  Entered: Vinny Barron 02/19/2019, 8:33 AM       The patient's care was discussed with the Attending Physician, Dr. Brady.    Vinny Barron  Medical Student  [facility]    ______________________________________________________________________    Chief Complaint   Lightheadedness, weakness and potential right sided weakness     History is obtained from the patient    History of Present Illness   Jazmin Clifton is a 86 year old female who has a history of  hypertension, COPD, congenital cystic kidney disease, obesity and is admitted for  lightheadedness, weakness and potential right sided weakness.     Patient woke up this morning feeling very lightheaded and close to passing out. She woke up around 4:30, was significantly short of breath and needed to go to the bathroom. She felt weak on her feet, very lightheaded and insecure walking. She lives in the [facility] and called for assistance. The patient didn't notice any specific weakness in any of her extremities, just a general difficulty walking.   She denies headache, spots in front of her eyes. She experienced nausea with some retching.    She presented to the ED physician who possibly could see facial droop, with which the family disagreed.     The patient also complains of increased swelling of both legs. She has been seen by caretaker at her assisted living facility. She was seen by lymphedema. She developed blisters on left foot, one of which broke.     Review of Systems    ROS:    C: Gaining weight last months. NEGATIVE for fever, chills.   I: NEGATIVE for worrisome rashes, moles or lesions  E: NEGATIVE for vision changes or irritation  E/M: NEGATIVE for ear, mouth and throat problems  R: SOB while while awaking/ PND. NEGATIVE for significant cough.  CV: NEGATIVE for chest pain, palpitations. Severe peripheral edema  GI: Nausea and dry heaves. NEGATIVE for abdominal pain, heartburn, or change in bowel habits  : NEGATIVE for frequency, dysuria, or hematuria  M: Severe back and hip pain. Mild shoulder pain.   N: weakness and lightheadedness  E: NEGATIVE for temperature intolerance, skin/hair changes  H: NEGATIVE for  "bleeding problems  P: NEGATIVE for changes in mood or affect      Past Medical History    I have reviewed this patient's medical history and updated it with pertinent information if needed.   Past Medical History:   Diagnosis Date     ABDOMINAL PAIN GENERALIZED 3/15/2006    1 month of abdominal pain that llqwhich radiates into her back and chest.  Pt was hospitilzed 2x for the pain at [facility] --pt hopsitized for 3 days.  Rcords not ab=vailable.  She was told either \" twisted intestine or blockage of bowel.  Pt feels it is the hiatal hernia.  Pt hospitilized again a second time  A month ago.  Ct scans x 2 showed \" nothing.\"  Pt had a barium and xrays.  Pt sa     Abdominal pain, generalized 3/15/2006    1 month of abdominal pain that llqwhich radiates into her back and chest.  Pt was hospitilzed 2x for the pain at [facility] --pt hopsitized for 3 days.  Rcords not ab=vailable.  She was told either \" twisted intestine or blockage of bowel.  Pt feels it is the hiatal hernia.  Pt hospitilized again a second time  A month ago.  Ct scans x 2 showed \" nothing.\"  Pt had a barium and xrays.  Pt sa     Atherosclerosis of renal artery (H)     Left renal artery stenosis     BENIGN HYPERTENSION 5/1/2006 5/1/2006   Increase catapres to 0.3 mg and decrease clonidine to 0.1 mg daily.  Recheck bp in 1 month.      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     Dyspepsia     Generalized osteoarthrosis, unspecified site     DJD-chronic back pain     Herpes zoster dermatitis of eyelid      Insomnia, unspecified      Lumbago     Chronic back pain     Other chest pain     Atypical Chest Pain     Other specified cardiac " dysrhythmias(427.89)     Bradycardia, improved on lower dose beta blockers      Rectocele     Grade 3     Unspecified disorder of kidney and ureter     Renal insufficiency     Unspecified essential hypertension         Past Surgical History   I have reviewed this patient's surgical history and updated it with pertinent information if needed.  Past Surgical History:   Procedure Laterality Date     CHOLECYSTECTOMY, LAPOROSCOPIC  1997    Cholecystectomy, Laparoscopic     ENDARTERECTOMY CAROTID Right 8/31/2017    Procedure: ENDARTERECTOMY CAROTID;  RIGHT CAROTID ENDARTERECTOMY WITH EEG;  Surgeon: Hilario Parry MD;  Location: [facility] OR     HYSTERECTOMY, RAYMOND  1982    oophorectomy,RAYMOND     SURGICAL HISTORY OF -       Laminectomy x 3     SURGICAL HISTORY OF -       MCA Aneurysm repair     SURGICAL HISTORY OF -   1996    Bladder suspension (Bladder Repair x2)     SURGICAL HISTORY OF -       Bilateral Hand Surgeries for Arthritis x2     SURGICAL HISTORY OF -       Repair of a Cerebral Aneurysm        Social History   I have reviewed this patient's social history and updated it with pertinent information if needed. Jazmin Clifton used to smoke until 1992. She doesn't drink alcohol anymore. She used to drink occasionally. She lives in the [facility] in assisted living.     Family History   I have reviewed this patient's family history and updated it with pertinent information if needed.   Family History   Problem Relation Age of Onset     Cancer Mother         stomache     Cerebrovascular Disease Father      Heart Disease Father      Cancer Brother         lymphoma     Eye Disorder Son      Asthma Son      Diabetes Son      Gastrointestinal Disease Son         no control over bladder or bowels     C.A.D. No family hx of      Hypertension No family hx of      Breast Cancer No family hx of      Cancer - colorectal No family hx of      Prostate Cancer No family hx of        Prior to Admission Medications   Prior to Admission  Medications   Prescriptions Last Dose Informant Patient Reported? Taking?   Alendronate Sodium (FOSAMAX PO)   Yes No   Sig: Take 10 mg by mouth once a week Take 1 tab once weekly (Thursday) on an empty stomach with full glass of water. Remain upright for 30 minutes. Wait 30 minutes before eating   Ascorbic Acid (VITAMIN C PO)   Yes No   Sig: Take 500 mg by mouth daily   CALCIUM 600+D 600-200 MG-UNIT TABS   No No   Sig: TAKE 1 TAB BY MOUTH ONCE DAILY   DONEPEZIL HCL PO   Yes No   Sig: Take 5 mg by mouth daily   DULoxetine HCl (CYMBALTA PO)   Yes No   Sig: Take 30 mg by mouth daily   Furosemide (LASIX PO)  Nursing Home Yes No   Sig: Take 40 mg by mouth every morning And 20mg q afternoon.   GABAPENTIN PO   Yes No   Sig: Take 200 mg by mouth At Bedtime    GABAPENTIN PO   Yes No   Sig: Take 100 mg by mouth every morning   HYDROcodone-acetaminophen (NORCO) 5-325 MG tablet   Yes No   Sig: Take 1 tablet by mouth every 6 hours as needed for breakthrough pain    HYDROcodone-acetaminophen (NORCO) 5-325 MG tablet   Yes No   Sig: Take 1 tablet by mouth 3 times daily   MAGNESIUM OXIDE PO  Nursing Home Yes No   Sig: Take 250 mg by mouth daily   Menthol, Topical Analgesic, (BIOFREEZE) 4 % GEL   Yes No   Sig: Externally apply topically 4 times daily as needed Apply topically to right hip and buttock 4 times per day. 8am, 2pm, 4pm, and 8pm   OMEPRAZOLE PO   Yes No   Sig: Take 40 mg by mouth every morning   acetaminophen (TYLENOL) 325 MG tablet   Yes No   Sig: Take 650 mg by mouth every 4 hours as needed for mild pain   aspirin 81 MG chewable tablet   Yes No   Sig: Take 81 mg by mouth   atorvastatin (LIPITOR) 40 MG tablet   No No   Sig: TAKE 1 TABLET BY MOUTH ONCE DAILY   Patient taking differently: TAKE 1 TABLET BY MOUTH at bedtime   calcium carbonate (TUMS) 500 MG chewable tablet   Yes No   Sig: Take 1 chew tab by mouth every hour as needed for heartburn   cephALEXin (KEFLEX) 500 MG capsule   No No   Sig: Take 1 capsule (500 mg) by  mouth 2 times daily for 7 days   ferrous sulfate (FEROSUL) 325 (65 Fe) MG tablet   Yes No   Sig: Take 325 mg by mouth daily (with breakfast)   fluticasone-vilanterol (BREO ELLIPTA) 100-25 MCG/INH oral inhaler  Nursing Home Yes No   Sig: Inhale 1 puff into the lungs daily   guaiFENesin (ROBITUSSIN) 100 MG/5ML SYRP   Yes No   Sig: Take 10 mLs by mouth every 4 hours as needed for cough Every 4 hours up to 6 doses in 24 hours   hypromellose-dextran (ARTIFICAL TEARS) 0.1-0.3 % ophthalmic solution   Yes No   Sig: Place 1 drop into both eyes every 6 hours as needed for dry eyes   isosorbide mononitrate (IMDUR) 30 MG 24 hr tablet   No No   Sig: TAKE 1 TABLET BY MOUTH ONCE DAILY   isradipine (DYNACIRC) 5 MG capsule   No No   Sig: TAKE 1 CAPSULE BY MOUTH TWICE DAILY   levalbuterol (XOPENEX) 1.25 MG/3ML neb solution   Yes No   Sig: Take 1 ampule by nebulization every 4 hours as needed for shortness of breath / dyspnea or wheezing And every 4 hours PRN   lisinopril (PRINIVIL/ZESTRIL) 10 MG tablet   No No   Sig: Take 2 tablets (20 mg) by mouth daily   melatonin 3 MG CAPS   Yes No   Sig: Take 3 mg by mouth At Bedtime   memantine (NAMENDA) 5 MG tablet   No No   Sig: TAKE 1 TABLET BY MOUTH ONCE DAILY   nystatin (MYCOSTATIN) 203276 UNIT/GM POW  Nursing Home Yes No   Sig: Apply topically 2 times daily as needed    senna-docusate (SENOKOT-S/PERICOLACE) 8.6-50 MG tablet   Yes No   Sig: Take 1 tablet by mouth 2 times daily And 1 tab every day PRN   tiZANidine (ZANAFLEX) 2 MG tablet   No No   Sig: Take 1 tablet (2 mg) by mouth 3 times daily as needed for muscle spasms      Facility-Administered Medications: None     Allergies   Allergies   Allergen Reactions     Accupril      Ace Inhibitors Unknown     Accupril     Augmentin Unknown     Levofloxacin Unknown, Muscle Pain (Myalgia) and Other (See Comments)     Comment: myalgias, Description:   Pt prescribed ciprofloxacin in Jan 2018 (no rxn documented)  Myalgias       Morphine Other (See  Comments)     hallucinations     Nitrofurantoin Nausea and Vomiting and Unknown     Norvasc [Amlodipine Besylate]      Leg swelling       Quinapril Other (See Comments) and Unknown     Hypertension. 3.29.18 - Takes and tolerates lisinopril  Patient reports HTN with as reaction to this medication         Physical Exam   Vital Signs: Temp: 97.7  F (36.5  C) Temp src: Oral BP: 174/85 Pulse: 69   Resp: 16 SpO2: 100 % O2 Device: Oxymask    Weight: 200 lbs 0 oz    Constitutional: awake, alert, cooperative, no apparent distress, and appears stated age  Eyes: Pupils equal, round and reactive to light, extra ocular muscles intact, sclera clear, conjunctiva normal  ENT: Normocephalic, atraumatic, sinuses nontender on palpation, external ears without lesions, oral pharynx with moist mucous membranes, tonsils without erythema or exudates, gums normal and good dentition.  Hematologic / Lymphatic: no cervical lymphadenopathy  Respiratory: Decent air movement, bilateral coarse crackles at the basis, mild anterior wheezing.   Cardiovascular: Normal apical impulse, regular rate and rhythm, normal S1 and S2, no S3 or S4, and no murmur noted  GI: No scars, normal bowel sounds, soft, non-distended, non-tender,  no masses palpated, no hepatosplenomegally  Skin: Edema bilaterally of the legs with blister on the top of the left foot. Mildly erythematous. +1 pitting edema.   Seroma of the right groin.   Musculoskeletal: Pain of back, and hips.   Neurologic: Awake, alert, oriented to name, place and time.    equal  strength, neg Romberg, DTR II/IV bilaterally (UE and LE), finger to nose normal, CN intact, ambulates without difficulty.  no focal deficits noted.  CRANIAL NERVES: Discs flat. Pupils equal, round and reactive to light. Extraocular movements full. Visual fields full. Face moves symmetrically. Tongue midline.    NEUROLOGIC: Motor strength 5/5, reflexes 2/4. Toe signs are down-going. Good finger-nose-finger, fine finger  movement, and heel-shin maneuvers.       Data   Data reviewed today: I reviewed all medications, new labs and imaging results over the last 24 hours. I personally reviewed     EKG 2/19/2019:   Sinus rhythm without acute ischemic abnormality or arrhythmia    CT HEAD W/O CONTRAST  2/19/2019:  There is generalized atrophy of the brain. There is low  attenuation in the white matter of the cerebral hemispheres consistent  with sequelae of small vessel ischemic disease. There is no evidence  of intracranial hemorrhage, mass, acute infarct or anomaly.  Postoperative change in the right temporal lobe.     The visualized portions of the sinuses and mastoids appear normal.  There is no evidence of trauma.              Recent Labs   Lab 02/19/19  0546 02/14/19  0914   WBC 4.6 6.3   HGB 10.2* 9.8*   MCV 92 93    229   INR 1.09  --     139   POTASSIUM 3.9 3.8   CHLORIDE 106 105   CO2 31 29   BUN 26 27   CR 1.53* 1.57*   ANIONGAP 5 5   BLAIR 8.5 9.1   GLC 92 129*   ALBUMIN 3.1*  --    PROTTOTAL 6.0*  --    BILITOTAL 0.4  --    ALKPHOS 69  --    ALT 15  --    AST 17  --    TROPI <0.015  --

## 2019-02-19 NOTE — ED NOTES
"Return to room from CT pt having significant increase in back and hip pain from laying on scan table-  Knees elevated on bed provides some relief   Continues to have think mucus with cough while laying down- to thick to spit- out causes some gagging wipe with tissue to remove   Pt reports this cough just started this am- with the bad taste in her mouth  Upon return to room MD notified of pain - meds given as ordered- pt feeling better  Reports dizziness has improved vision has improved \"everythig is better\" \"just not quite right\"        "

## 2019-02-19 NOTE — ED TRIAGE NOTES
"Pt has multiple complaints of pain and weakness that is NOT new     states reason for visit today is she was very dizzy and lightheaded when she awoke- some nausea as well   Symptoms are worse when eyes are open   Has funny \"funny taste in mouth\"  Also feels the L side of her mouth isn't working quite right    "

## 2019-02-19 NOTE — ED NOTES
"SaO2 dropped post dilaudid, placed on NC pt reports \"my nose is to stuffed up I can't breath through my nose\"    Placed NC prongs at mouth SaO2 responded quickly  "

## 2019-02-20 ENCOUNTER — APPOINTMENT (OUTPATIENT)
Dept: CT IMAGING | Facility: CLINIC | Age: 84
DRG: 190 | End: 2019-02-20
Attending: FAMILY MEDICINE
Payer: COMMERCIAL

## 2019-02-20 ENCOUNTER — APPOINTMENT (OUTPATIENT)
Dept: PHYSICAL THERAPY | Facility: CLINIC | Age: 84
DRG: 190 | End: 2019-02-20
Payer: COMMERCIAL

## 2019-02-20 ENCOUNTER — APPOINTMENT (OUTPATIENT)
Dept: OCCUPATIONAL THERAPY | Facility: CLINIC | Age: 84
DRG: 190 | End: 2019-02-20
Payer: COMMERCIAL

## 2019-02-20 PROBLEM — Z71.89 ACP (ADVANCE CARE PLANNING): Chronic | Status: RESOLVED | Noted: 2017-08-03 | Resolved: 2019-02-20

## 2019-02-20 LAB
ALBUMIN SERPL-MCNC: 2.9 G/DL (ref 3.4–5)
ALP SERPL-CCNC: 59 U/L (ref 40–150)
ALT SERPL W P-5'-P-CCNC: 12 U/L (ref 0–50)
ANION GAP SERPL CALCULATED.3IONS-SCNC: 6 MMOL/L (ref 3–14)
AST SERPL W P-5'-P-CCNC: 14 U/L (ref 0–45)
BILIRUB SERPL-MCNC: 0.3 MG/DL (ref 0.2–1.3)
BUN SERPL-MCNC: 28 MG/DL (ref 7–30)
CALCIUM SERPL-MCNC: 8.5 MG/DL (ref 8.5–10.1)
CHLORIDE SERPL-SCNC: 104 MMOL/L (ref 94–109)
CO2 SERPL-SCNC: 31 MMOL/L (ref 20–32)
CREAT SERPL-MCNC: 1.5 MG/DL (ref 0.52–1.04)
ERYTHROCYTE [DISTWIDTH] IN BLOOD BY AUTOMATED COUNT: 18.3 % (ref 10–15)
FOLATE SERPL-MCNC: 16.8 NG/ML
GFR SERPL CREATININE-BSD FRML MDRD: 31 ML/MIN/{1.73_M2}
GLUCOSE SERPL-MCNC: 121 MG/DL (ref 70–99)
HCT VFR BLD AUTO: 31.4 % (ref 35–47)
HGB BLD-MCNC: 9.2 G/DL (ref 11.7–15.7)
MCH RBC QN AUTO: 26.6 PG (ref 26.5–33)
MCHC RBC AUTO-ENTMCNC: 29.3 G/DL (ref 31.5–36.5)
MCV RBC AUTO: 91 FL (ref 78–100)
PLATELET # BLD AUTO: 209 10E9/L (ref 150–450)
POTASSIUM SERPL-SCNC: 4.7 MMOL/L (ref 3.4–5.3)
PROT SERPL-MCNC: 5.5 G/DL (ref 6.8–8.8)
RBC # BLD AUTO: 3.46 10E12/L (ref 3.8–5.2)
SODIUM SERPL-SCNC: 141 MMOL/L (ref 133–144)
WBC # BLD AUTO: 4.8 10E9/L (ref 4–11)

## 2019-02-20 PROCEDURE — 40000133 ZZH STATISTIC OT WARD VISIT

## 2019-02-20 PROCEDURE — 85027 COMPLETE CBC AUTOMATED: CPT | Performed by: FAMILY MEDICINE

## 2019-02-20 PROCEDURE — 97535 SELF CARE MNGMENT TRAINING: CPT | Mod: GO

## 2019-02-20 PROCEDURE — 25000132 ZZH RX MED GY IP 250 OP 250 PS 637: Performed by: FAMILY MEDICINE

## 2019-02-20 PROCEDURE — 97110 THERAPEUTIC EXERCISES: CPT | Mod: GP | Performed by: PHYSICAL THERAPIST

## 2019-02-20 PROCEDURE — 40000270 ZZH STATISTIC OXYGEN  O2DAILY TECH TIME

## 2019-02-20 PROCEDURE — 40000275 ZZH STATISTIC RCP TIME EA 10 MIN

## 2019-02-20 PROCEDURE — 25000125 ZZHC RX 250: Performed by: FAMILY MEDICINE

## 2019-02-20 PROCEDURE — 97161 PT EVAL LOW COMPLEX 20 MIN: CPT | Mod: GP | Performed by: PHYSICAL THERAPIST

## 2019-02-20 PROCEDURE — 99233 SBSQ HOSP IP/OBS HIGH 50: CPT | Performed by: FAMILY MEDICINE

## 2019-02-20 PROCEDURE — 40000193 ZZH STATISTIC PT WARD VISIT: Performed by: PHYSICAL THERAPIST

## 2019-02-20 PROCEDURE — 36415 COLL VENOUS BLD VENIPUNCTURE: CPT | Performed by: FAMILY MEDICINE

## 2019-02-20 PROCEDURE — 94640 AIRWAY INHALATION TREATMENT: CPT

## 2019-02-20 PROCEDURE — 94640 AIRWAY INHALATION TREATMENT: CPT | Mod: 76

## 2019-02-20 PROCEDURE — 71250 CT THORAX DX C-: CPT

## 2019-02-20 PROCEDURE — 12000000 ZZH R&B MED SURG/OB

## 2019-02-20 PROCEDURE — 25000132 ZZH RX MED GY IP 250 OP 250 PS 637: Performed by: PHYSICIAN ASSISTANT

## 2019-02-20 PROCEDURE — 80053 COMPREHEN METABOLIC PANEL: CPT | Performed by: FAMILY MEDICINE

## 2019-02-20 PROCEDURE — 25000128 H RX IP 250 OP 636: Performed by: FAMILY MEDICINE

## 2019-02-20 PROCEDURE — 82746 ASSAY OF FOLIC ACID SERUM: CPT | Performed by: FAMILY MEDICINE

## 2019-02-20 RX ORDER — FUROSEMIDE 10 MG/ML
40 INJECTION INTRAMUSCULAR; INTRAVENOUS ONCE
Status: COMPLETED | OUTPATIENT
Start: 2019-02-20 | End: 2019-02-20

## 2019-02-20 RX ADMIN — DONEPEZIL HYDROCHLORIDE 5 MG: 5 TABLET ORAL at 08:13

## 2019-02-20 RX ADMIN — OMEPRAZOLE 40 MG: 20 CAPSULE, DELAYED RELEASE ORAL at 08:12

## 2019-02-20 RX ADMIN — ATORVASTATIN CALCIUM 40 MG: 20 TABLET, FILM COATED ORAL at 08:14

## 2019-02-20 RX ADMIN — IPRATROPIUM BROMIDE AND ALBUTEROL SULFATE 3 ML: .5; 3 SOLUTION RESPIRATORY (INHALATION) at 06:29

## 2019-02-20 RX ADMIN — LISINOPRIL 20 MG: 20 TABLET ORAL at 08:14

## 2019-02-20 RX ADMIN — Medication 3 MG: at 22:13

## 2019-02-20 RX ADMIN — MICONAZOLE NITRATE: 2 POWDER TOPICAL at 14:14

## 2019-02-20 RX ADMIN — ISOSORBIDE MONONITRATE 30 MG: 30 TABLET, EXTENDED RELEASE ORAL at 08:13

## 2019-02-20 RX ADMIN — OXYCODONE HYDROCHLORIDE 5 MG: 5 TABLET ORAL at 19:47

## 2019-02-20 RX ADMIN — FUROSEMIDE 40 MG: 40 TABLET ORAL at 08:13

## 2019-02-20 RX ADMIN — OXYCODONE HYDROCHLORIDE 5 MG: 5 TABLET ORAL at 10:30

## 2019-02-20 RX ADMIN — FERROUS SULFATE TAB 325 MG (65 MG ELEMENTAL FE) 325 MG: 325 (65 FE) TAB at 08:12

## 2019-02-20 RX ADMIN — ACETAMINOPHEN 1000 MG: 500 TABLET, FILM COATED ORAL at 14:26

## 2019-02-20 RX ADMIN — IPRATROPIUM BROMIDE AND ALBUTEROL SULFATE 3 ML: .5; 3 SOLUTION RESPIRATORY (INHALATION) at 15:46

## 2019-02-20 RX ADMIN — ACETAMINOPHEN 1000 MG: 500 TABLET, FILM COATED ORAL at 08:11

## 2019-02-20 RX ADMIN — FUROSEMIDE 40 MG: 10 INJECTION, SOLUTION INTRAVENOUS at 12:44

## 2019-02-20 RX ADMIN — IPRATROPIUM BROMIDE AND ALBUTEROL SULFATE 3 ML: .5; 3 SOLUTION RESPIRATORY (INHALATION) at 19:12

## 2019-02-20 RX ADMIN — GABAPENTIN 200 MG: 100 CAPSULE ORAL at 22:13

## 2019-02-20 RX ADMIN — PREDNISONE 40 MG: 20 TABLET ORAL at 08:12

## 2019-02-20 RX ADMIN — POLYETHYLENE GLYCOL 3350 17 G: 17 POWDER, FOR SOLUTION ORAL at 16:51

## 2019-02-20 RX ADMIN — FELODIPINE 10 MG: 5 TABLET, FILM COATED, EXTENDED RELEASE ORAL at 16:48

## 2019-02-20 RX ADMIN — MEMANTINE 5 MG: 5 TABLET ORAL at 08:12

## 2019-02-20 RX ADMIN — GABAPENTIN 100 MG: 100 CAPSULE ORAL at 08:12

## 2019-02-20 RX ADMIN — ACETAMINOPHEN 1000 MG: 500 TABLET, FILM COATED ORAL at 19:47

## 2019-02-20 ASSESSMENT — ACTIVITIES OF DAILY LIVING (ADL)
ADLS_ACUITY_SCORE: 24
ADLS_ACUITY_SCORE: 18
ADLS_ACUITY_SCORE: 18
ADLS_ACUITY_SCORE: 24
ADLS_ACUITY_SCORE: 18
ADLS_ACUITY_SCORE: 18

## 2019-02-20 ASSESSMENT — MIFFLIN-ST. JEOR: SCORE: 1341.13

## 2019-02-20 ASSESSMENT — PAIN DESCRIPTION - DESCRIPTORS: DESCRIPTORS: SHARP

## 2019-02-20 NOTE — PROGRESS NOTES
"  Physician Attestation   I, Lincoln Brady, was present with the medical student who participated in the service and in the documentation of the note.  I have verified the history and personally performed the physical exam and medical decision making.  I agree with the assessment and plan of care as documented in the note.      I personally reviewed vital signs, medications, labs and imaging.    The exam and assessment/plan were done by me     Lincoln Brady MD  Date of Service (when I saw the patient): 02/20/19    [facility]    Progress Note        Date of Admission:  2/19/2019    Assessment & Plan   Jazmin Clifton is a 86 year old female admitted on 2/19/2019. She has a history of hypertension, COPD, congenital cystic kidney disease, obesity and is admitted for lightheadedness, weakness and potential left sided facial weakness as well as subjective right sided weakness      Lightheadedness with potential right sided weakness and left sided facial weakness  ED physician saw left sided facial weakness but no right sided weakness, as noted from EMS.   Concern for stroke.   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 She thinks this got worse yesterday/this am. Family disagreed. History of Bilateral carotid stenosis with endarterectomy on the right. Bubble study done in end of 2017 with no signs of shunting.   CT was negative. On initial exam potentially increased left  sided facial weakness. Later not noticeable.   It is very likely that an acute onset of systemic  disease (hypoxia) is causing her underlying motoric dysfunction from previous stroke to increase in severity.   2/20/2019 - MRI didn't show new ischemia or old infarct. 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 Claims face feels the same as when she had the stroke back in 2017, with weakness and burning sensation.    - New Stroke is ruled out at this point.        Acute Hypoxic resp failure   Patient woke up this am with significant shortness of breath. Short of breath even when sitting, but more severe on exertion. Has known COPD and is previous smoker (quit 25 years ago). On exam bilateral coarse crackles and mild anterior wheezing. Needed 4l of O2 in the ED, O2 sats were 89% initially, recovered to high 90's.   DDx: HF vs Infex vs PE vs COPD exacerbation   PE can't be ruled out at the moment. CT-A seems inadvisable with CKD at the time being.   CXR to check for fluid overload and labs to rule out infectious process. No fever, no WBC but respiratory distress, no cough and no phlegm. Infectious seems unlikely but needs to be r/o. Will start treating for COPD exacerbation. Given one dose lasix 40 mg iv on top of her regular dose of 60 mg /d   2/20/2019 - CXR showed enlarged heart, bilateral atelectasis but no significant signs of fluid overload. CRP of 15.4 BNP and procalcitonin normal.  VBG showed CO2 retention (59) with metabolic compensation, pH 7.34.   Patient feels better with less shortness of breath. She claims having passed large amount of urine. She also claims having relief with duonebs. Wheezing on exam.   At this point COPD exacerbation seems most likely.   - continue steroids 40 mg x 5d + duonebs + albuterol prn   - chest CT for better evaluation  : pending        Bilateral leg edema   Patient presents with recent severe increase of swelling of both legs. She came to ED with wrapped legs from assisted living. She complains of blisters on the left foot, one broke couple of days ago. The swelling has been coming on for " several weeks. She was put on Cephalexin on 02/15 for the open blister.  After unwrapping the legs, the ankles and shins looked fairly normal and according to the patient significantly better than before.   DDx: venous insufficiency vs CHF  Patient has history of heart failure in her chart, but normal echo in 2018 with normal pressure of IVC and normal EF. She is on 60 mg furosemide before hospitalization (40 mg in AM 20 mg in pm)   With crackles in lungs and increased sob with bilateral leg edema, decompensated HF sounds very possible. The abx were hold and lymphedema consult was ordered.    2/20/2019 - ECHO showed concentric left ventricular hypertrophy and enlarged left ventricle, as well as enlarger aortic root. Normal IVC pressure. BNP normal, CXR not sufficient for evaluation of pathology. She thinks the legs are even better today. Both legs wrapped during visit.   - repeat iv furosemide dose today          Recent E. Coli positve urine culture  Patient had UC done on 02/09/19 and showed growth of E. Coli. When asked about urinary symptoms, she denies burning, changes in frequency, change in color or pain. She was put on cephalexin for possible suspected cellulitis. Should cover for UTI as well. No signs of infection with normal WBC, temperature, but might contribute to weakness.   UA now negative for leukocyte esterase and other indicator for infection.    - resolved     Iron deficiency anemia  Patient has low Hb dating back to 01/29/2016. She is on iron tablets. In the chart I couldn't find folic acid or iron studies. Vit b12 was checked and normal 10/2018. Hb of 10.2 on admission with MCHC of 29.3.    2/20/2019 - Vit B12 normal. Iron deficient.  - folic pending    - continue home iron.  Has been on Iron and serum Iron still only 27.  Would continue this for a month and then recheck iron levels and if no improvement would consider looking for celiac disease and consider iron infusion therapy.       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 This seems to be close to her baseline.   Caution with iv contrast or furosemide         Hypertension  Patient has chronic hypertension. She is on ACEi, furosemide and CCB. Since hospitalization her BP have been running > 165 systolic mmHg.   Could be possible that this is secondary HTN due to kidney disease with hypertension refractory to 3 medications.   - continue home meds and observe  - 40 mg furosemide injection once   2/20/2019 - reasonably controlled Bp over last 24 hours.         Seroma of right groin  Patient presented to the ED 02/09/2019 for swelling of the right  groin. She was found to have seroma after initial surgery over a year ago with tumor and  lymph node resection. Subjectively got bigger and more tender, for which she came to the ED on 02/09/2019. She was advised to use a compressive garment (SPANKS) with which she has been noncompliant. Drainage was not advised as it would most likely reoccur.   - use a compressive garment (SPANKS)    Hoarseness and subjective dysphagia   Patient complains of hoarseness on 2/20/2019, especially after coughing. She claims she had this before and it feels like there is an obstruction in her larynx. She also states that she has problems swallowing pills or food from time to time.   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 Not retraceable in the chart.              Diet: Advance Diet as Tolerated: Clear Liquid Diet    Fluids: oral   Lines: peripheral   DVT Prophylaxis: Anti-embolisim stockings (TEDs)  Maldonado Catheter: not present  Code Status: DNI      Disposition Plan   Expected discharge: Tomorrow, recommended to prior living arrangement  once stable.  Entered: Vinny Barron 02/20/2019, 10:22 AM       The patient's care was discussed with the Attending Physician, Dr. Brady.    Vinny Barron  Medical Student  [facility]    ______________________________________________________________________    Interval History   Patient is feeling significantly better. She is complaining of less shortness of breath. Still a cough that is unproductive. She also feels like her left side of her face is still weak and has a burning sensation. She states that she feels her voice being hoarse. Her edema in her leg is better. High urine output according to patient. Pains of back, hip and shoulder are less severe today.     Data reviewed today: I reviewed all medications, new labs and imaging results over the last 24 hours. I personally reviewed     MR ANGIO HEAD W/O CONTRAST   2/19/2019   IMPRESSION:    1. Atherosclerotic stenoses in the posterior cerebral arteries  bilaterally.  2. 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    MRI BRAIN W/O CONTRAST  2/19/2019   IMPRESSION:    1. No evidence of acute infarct, hemorrhage or mass.  2. Brain atrophy and white matter changes consistent with sequelae of  small vessel ischemic disease.  3. Scattered old microhemorrhages in the posterior right thalamus and  the central right cerebellar hemisphere, likely from previous  hypertension.  4. No change.      MRA NECK WITHOUT CONTRAST  2/19/2019  FINDINGS:   Right Carotid: Tortuous internal carotid. No stenosis.     Left Carotid: Tortuous internal carotid. No stenosis.     Vertebral and Basilar: Dominant left vertebral artery. Tiny right  vertebral artery ends in PICA.                                                                      IMPRESSION: Tortuous internal carotid arteries with no significant  stenosis. No change.     CHEST TWO VIEW 2/19/2019   FINDINGS: Suboptimal inspiration with  hypoventilatory changes. Again  there is asymmetric elevation of the right hemidiaphragm. Mild  bilateral atelectasis. The degree of cardiac enlargement is similar,  allowing for AP technique. Superior translation of the right humeral  head, presumably related to rotator cuff pathology. Stable dense left  upper lobe nodule, consistent with a granuloma.                                                                      IMPRESSION:  No acute consolidation.      ECHO 2/19/2019   Interpretation Summary     The left ventricle is normal in size.  There is mild concentric left ventricular hypertrophy.  Left ventricular systolic function is normal.  The visual ejection fraction is estimated at 55-60%.  The right ventricle is normal in size and function.  The left atrium is mildly dilated.  Mild aortic root dilatation.  The ascending aorta is Mildly dilated.  No hemodynamically significant valvular abnormalities on 2D or color flow  imaging.     Compared to the study from 7/27/2017, there are no significant change.  The study was technically difficult. Limited views were obtained. Contrast was  used without apparent complications.      Physical Exam   Vital Signs: Temp: 97.5  F (36.4  C) Temp src: Oral BP: 145/61 Pulse: 73   Resp: 16 SpO2: 94 % O2 Device: None (Room air) Oxygen Delivery: 1 LPM  Weight: 205 lbs 7.5 oz  General Appearance: No fever, chills or weakness. Open and friendly in contact. No noticeable distress   Respiratory: Decent air movement, bilateral coarse crackles at the basis, mild anterior wheezing    Cardiovascular: regular rate and rhythm, normal S1 and S2, no murmur, click or rub.   GI: abdomen soft, nontender, no HSM or masses and bowel sounds normal   Skin: no abnormalities or worrisome changes.   MS: no clubbing, cyanosis, edema  Neuro: Possible left sided facial palsy. No weakness or motoric dysfunction of extremities. Sensory intact.        Data   Recent Labs   Lab 02/20/19  0510 02/19/19  0506  02/14/19  0914   WBC 4.8 4.6 6.3   HGB 9.2* 10.2* 9.8*   MCV 91 92 93    228 229   INR  --  1.09  --     142 139   POTASSIUM 4.7 3.9 3.8   CHLORIDE 104 106 105   CO2 31 31 29   BUN 28 26 27   CR 1.50* 1.53* 1.57*   ANIONGAP 6 5 5   BLAIR 8.5 8.5 9.1   * 92 129*   ALBUMIN 2.9* 3.1*  --    PROTTOTAL 5.5* 6.0*  --    BILITOTAL 0.3 0.4  --    ALKPHOS 59 69  --    ALT 12 15  --    AST 14 17  --    TROPI  --  <0.015  --

## 2019-02-20 NOTE — PLAN OF CARE
Patient went down for CT today. Dr. Orellana notified of results. Patient with strong coughing episodes. Coughing up thick clear secretions. Requiring oxygen at 1 L to keep saturation above 92%. Received dose of IV Lasix. Crackles noted in the lower lobes. Will start IS. Legs wrapped by therapy. Blister on foot open, cleaned and dressing reapplied.

## 2019-02-20 NOTE — PLAN OF CARE
Discharge Planner OT   Patient plan for discharge: goal is to return to [facility], however open to TCU    Current status: Min A for sit to stand with increased effort time and difficulty bringing B UE's to walker. Pt with significant coughing fit needing increased time to recover. Min Ax2 using FWW to transfer from bedside chair to commode to EOB.     Barriers to return to prior living situation: assist level, weakness    Recommendations for discharge: TCU to increase independence/safety with ADLs and functional mobility. Will continue to work with patient daily and update recommendations.     Rationale for recommendations: Needs Ax2 for ADLs and related transfers. Unable to ambulate to toilet- needing to use commode d/t weakness. At baseline pt is able to ambulate throughout assist living apartment.        Entered by: Christine Cardona 02/20/2019 11:32 AM

## 2019-02-20 NOTE — PLAN OF CARE
Pt has slept well between cares, denies pain at rest. Tolerates up to commode at bedside w/ 2 assist, gait belt & walker. Does have some flare of chronic back / hip pain w/ activity, but pt states once at rest it subsides. Pt is on scheduled tylenol q 8 hrs w/ oxycodone available prn. Pt has declined offers of further oxycodone tonight. Last received @ 1823.

## 2019-02-20 NOTE — PLAN OF CARE
VS stable with elevated BP probably 2/2 pain.  Got orders for pain management this evening (PO oxycodone along with tizanidine PRN and Tylenol scheduled) so will see if this helps.  Feels quite unwell with much dizziness/light-headedness, some occasional nausea, weakness.  Had an ECHO today that was grossly normal for her age, had MRA of brain and chest x-ray this afternoon that also don't show any acute changes.  Unclear why she is feeling so poorly.

## 2019-02-20 NOTE — PROGRESS NOTES
02/20/19 1200   General Information   Discipline PT-   Lymphedema Assessment   Onset of Edema 02/11/19   Affected Body Part(s) Left LE;Right LE   Etiology Comments Recent severe bilateral LE edema,   Pertinent history of current problem (PT: include personal factors and/or comorbidities that impact the POC; OT: include additional occupational profile info) Pt being seen at [facility] facility for Lymphedema  and reporting reduction of edema   Edema Precautions Cardiac Edema/CHF   Pain   Patient currently in pain Yes, see Vital Signs flowsheet   Pain comments chronic LBP, R hip pain    Edema Examination / Assessment   Skin Condition Non-pitting  (Decreased eema per pt, Toes edematous Left> Right)   Skin Condition Comments Blister Left  at dorsal metatarsal heads. healing   Capillary Refill (Good color ,sensation)   ROM   Range of Motion (WFL) no deficits were identified   Strength   Strength (WFL) other (describe)   Description of Strength Deficits see PT eval   Sensation   Sensation (WFL) no deficits were identified   Assessment/Plan   Patient presents with Edema   Clinical Presentation Stable/Uncomplicated   Clinical Decision Making (Complexity) Low complexity   Planned Edema Interventions Gradient compression bandaging   Treatment Frequency Daily x 2-3 days, then every other day    Treatment Duration for  Length  of hospitalization   Patient, Family and/or Staff in agreement with plan of care. Yes   Risks and benefits of treatment have been explained. Yes   Total Evaluation Time   Total Evaluation Time (Minutes) 10

## 2019-02-20 NOTE — PROGRESS NOTES
Care Transitions Progress Note:    Reason for Follow up:  Discharge Planning. Per Yessy SINGLETON, Jazmin was receiving help from [facility] for FAUSTINO socks on/off, bathing, set up/admin of medications and escorts in w/c. PT/OT are recommending TCU. Discussed TCU, Patient was provided with Medicare certified nursing home list. Pts choices are as follows [facility] (Phone: [phone] Fax: [phone]). Patient only prefers [facility]. She agreed to have referrals faxed to [facility] (Main: [phone] Admissions: [phone] Fax: [phone]), [facility] Phone (Main Phone:[phone] Admissions Phone:[phone] Fax: [phone]) and [facility] (Phone: [phone] Admissions: [phone] Fax: [phone]). TCU referrals are pending.      Discharge Plan:  TCU    Discharge Planner   Discharge Plans in progress: TCU  Barriers to discharge plan: Clinical improvement  Follow up plan: Referral for Clinic Care Coordination. Follow up with PCP.       Entered by: Melissa Newby 02/20/2019 2:19 PM         Melissa Newby RN, Care Coordinator  [phone]

## 2019-02-20 NOTE — PROGRESS NOTES
Discharge Planner PT   Patient plan for discharge: TCU     Current status: Physical Therapy evaluation completed. Pt currently requiring minimal assistance of 2 to transfer using a RW.    Pt's LEs are very weak, tremulous, even at rest    Barriers to return to prior living situation: decreased indep. W/ mobility    Recommendations for discharge: TCU     Rationale for recommendations: Basic  Mobility 6Clicks  Assessment score- 11/24- correlated with anticipated need for TCU stay        Entered by: Honey Espinoza 02/20/2019 12:40 PM        02/20/19 1100   Quick Adds   Type of Visit Initial PT Evaluation   Living Environment   Lives With alone   Living Arrangements apartment;assisted living   Functional Level Prior   Ambulation 1-->assistive equipment  i)   Transferring 1-->assistive equipment   Toileting 3-->assistive equipment and person   Bathing 3-->assistive equipment and person   Communication 0-->understands/communicates without difficulty   Swallowing 0-->swallows foods/liquids without difficulty   Cognition 0 - no cognition issues reported   Fall history within last six months no   Which of the above functional risks had a recent onset or change? none   Prior Functional Level Comment PLOF- Pt independent w/ ambulation  using a rolling walker , reliant on a wc for longer distances.  Pt reports mulitple back surgeries and ambulation limited to household distances only   General Information   Onset of Illness/Injury or Date of Surgery - Date 02/18/19   Referring Physician Caitlyn   Patient/Family Goals Statement Pt w/ goal of returning to her apartment   Pertinent History of Current Problem (include personal factors and/or comorbidities that impact the POC) 86 year old female admitted on 2/19/2019. She has a history of hypertension, COPD, congenital cystic kidney disease, obesity and is admitted for lightheadedness, weakness and potential right sided weakness   Precautions/Limitations fall precautions   General  Observations Pt alert, plesant- reports feelin gless dizzy today, but contnues w/ weakness   Pain Assessment   Patient Currently in Pain Yes, see Vital Sign flowsheet  (LBP, R hip Pain)   Integumentary/Edema   Integumentary/Edema Comments See Lympedema Assessment   Range of Motion (ROM)   ROM Comment WFL LES    Strength   Strength Comments Pt w/ 3+/5 strength  With bilateral knee flexion.   LE strength very evident- pt tremulous w/ transfers and even at rest. Pt reports hx of multiple lumbar surgeries- recently due to spinal stenosis,  Reports her strength as always been decreased, but previously she was able to ambulate within her apartment with a walker    Bed Mobility   Bed Mobility Comments minimal assistance of 2 with supine> sitting   Transfer Skills   Transfer Comments Min. assistance of 2 with RW, transferred to [facility], then stood to return to bed. Weak, tremulous    Gait   Gait Comments NT- LE weak, see transfer comments   Balance   Balance Comments fair in standing due to weakness   Sensory Examination   Sensory Perception no deficits were identified   General Therapy Interventions   Planned Therapy Interventions bed mobility training;gait training;strengthening;transfer training   Clinical Impression   Criteria for Skilled Therapeutic Intervention yes, treatment indicated   PT Diagnosis Impaired gait   Influenced by the following impairments LE weakness, decreased balacne   Functional limitations due to impairments decreased indep. w/ sit> stand transfers.  decreased ambulatory status   Clinical Presentation Stable/Uncomplicated   Clinical Presentation Rationale clinical judgement   Clinical Decision Making (Complexity) Low complexity   Therapy Frequency` daily   Predicted Duration of Therapy Intervention (days/wks) 3 days   Anticipated Discharge Disposition Transitional Care Facility   Risk & Benefits of therapy have been explained Yes   Patient, Family & other staff in agreement with plan of care Yes  "  [facility] AM-PAC  \"6 Clicks\" V.2 Basic Mobility Inpatient Short Form   1. Turning from your back to your side while in a flat bed without using bedrails? 3 - A Little   2. Moving from lying on your back to sitting on the side of a flat bed without using bedrails? 2 - A Lot   3. Moving to and from a bed to a chair (including a wheelchair)? 2 - A Lot   4. Standing up from a chair using your arms (e.g., wheelchair, or bedside chair)? 2 - A Lot   5. To walk in hospital room? 1 - Total   6. Climbing 3-5 steps with a railing? 1 - Total   Basic Mobility Raw Score (Score out of 24.Lower scores equate to lower levels of function) 11     "

## 2019-02-20 NOTE — PLAN OF CARE
Discharge Planner PT   Patient plan for discharge: TCU     Current status: Lymphedema eval completed. Treatment initiated w/ gradient compression bandages to bilateral LEs  from the knee down.  Pt reports comfort.  Bandages to stay on until  tomorrow when lymphedema   Therapist returns.      Please remove bandages if creating  pain, change in sensation, not tolerable or soiled.       Barriers to return to prior living situation: dep. mobility    Recommendations for discharge: TCU, continued lymphedema Treatment  At [facility]          Entered by: Honey Espinoza 02/20/2019 1:01 PM

## 2019-02-21 ENCOUNTER — APPOINTMENT (OUTPATIENT)
Dept: OCCUPATIONAL THERAPY | Facility: CLINIC | Age: 84
DRG: 190 | End: 2019-02-21
Payer: COMMERCIAL

## 2019-02-21 ENCOUNTER — APPOINTMENT (OUTPATIENT)
Dept: ULTRASOUND IMAGING | Facility: CLINIC | Age: 84
DRG: 190 | End: 2019-02-21
Attending: FAMILY MEDICINE
Payer: COMMERCIAL

## 2019-02-21 ENCOUNTER — APPOINTMENT (OUTPATIENT)
Dept: PHYSICAL THERAPY | Facility: CLINIC | Age: 84
DRG: 190 | End: 2019-02-21
Payer: COMMERCIAL

## 2019-02-21 LAB
ALBUMIN SERPL-MCNC: 3.1 G/DL (ref 3.4–5)
ALP SERPL-CCNC: 57 U/L (ref 40–150)
ALT SERPL W P-5'-P-CCNC: 14 U/L (ref 0–50)
ANION GAP SERPL CALCULATED.3IONS-SCNC: 5 MMOL/L (ref 3–14)
AST SERPL W P-5'-P-CCNC: 14 U/L (ref 0–45)
BILIRUB SERPL-MCNC: 0.3 MG/DL (ref 0.2–1.3)
BUN SERPL-MCNC: 34 MG/DL (ref 7–30)
CALCIUM SERPL-MCNC: 8.7 MG/DL (ref 8.5–10.1)
CHLORIDE SERPL-SCNC: 105 MMOL/L (ref 94–109)
CO2 SERPL-SCNC: 32 MMOL/L (ref 20–32)
CREAT SERPL-MCNC: 1.33 MG/DL (ref 0.52–1.04)
ERYTHROCYTE [DISTWIDTH] IN BLOOD BY AUTOMATED COUNT: 18.8 % (ref 10–15)
FLUAV+FLUBV AG SPEC QL: NEGATIVE
FLUAV+FLUBV AG SPEC QL: NEGATIVE
GFR SERPL CREATININE-BSD FRML MDRD: 36 ML/MIN/{1.73_M2}
GLUCOSE SERPL-MCNC: 115 MG/DL (ref 70–99)
HCT VFR BLD AUTO: 31.8 % (ref 35–47)
HGB BLD-MCNC: 9.5 G/DL (ref 11.7–15.7)
MCH RBC QN AUTO: 26.9 PG (ref 26.5–33)
MCHC RBC AUTO-ENTMCNC: 29.9 G/DL (ref 31.5–36.5)
MCV RBC AUTO: 90 FL (ref 78–100)
PLATELET # BLD AUTO: 223 10E9/L (ref 150–450)
POTASSIUM SERPL-SCNC: 4.2 MMOL/L (ref 3.4–5.3)
PROT SERPL-MCNC: 5.9 G/DL (ref 6.8–8.8)
RBC # BLD AUTO: 3.53 10E12/L (ref 3.8–5.2)
SODIUM SERPL-SCNC: 142 MMOL/L (ref 133–144)
SPECIMEN SOURCE: NORMAL
WBC # BLD AUTO: 6.4 10E9/L (ref 4–11)

## 2019-02-21 PROCEDURE — 85027 COMPLETE CBC AUTOMATED: CPT | Performed by: FAMILY MEDICINE

## 2019-02-21 PROCEDURE — 97116 GAIT TRAINING THERAPY: CPT | Mod: GP | Performed by: PHYSICAL THERAPIST

## 2019-02-21 PROCEDURE — 97535 SELF CARE MNGMENT TRAINING: CPT | Mod: GO

## 2019-02-21 PROCEDURE — 99233 SBSQ HOSP IP/OBS HIGH 50: CPT | Performed by: FAMILY MEDICINE

## 2019-02-21 PROCEDURE — 94640 AIRWAY INHALATION TREATMENT: CPT | Mod: 76

## 2019-02-21 PROCEDURE — 97110 THERAPEUTIC EXERCISES: CPT | Mod: GO

## 2019-02-21 PROCEDURE — 87804 INFLUENZA ASSAY W/OPTIC: CPT | Performed by: FAMILY MEDICINE

## 2019-02-21 PROCEDURE — 25000132 ZZH RX MED GY IP 250 OP 250 PS 637: Performed by: FAMILY MEDICINE

## 2019-02-21 PROCEDURE — 94640 AIRWAY INHALATION TREATMENT: CPT

## 2019-02-21 PROCEDURE — 97110 THERAPEUTIC EXERCISES: CPT | Mod: GP | Performed by: PHYSICAL THERAPIST

## 2019-02-21 PROCEDURE — 25000132 ZZH RX MED GY IP 250 OP 250 PS 637: Performed by: PHYSICIAN ASSISTANT

## 2019-02-21 PROCEDURE — 12000000 ZZH R&B MED SURG/OB

## 2019-02-21 PROCEDURE — 36415 COLL VENOUS BLD VENIPUNCTURE: CPT | Performed by: FAMILY MEDICINE

## 2019-02-21 PROCEDURE — 40000193 ZZH STATISTIC PT WARD VISIT: Performed by: PHYSICAL THERAPIST

## 2019-02-21 PROCEDURE — 97530 THERAPEUTIC ACTIVITIES: CPT | Mod: GP | Performed by: PHYSICAL THERAPIST

## 2019-02-21 PROCEDURE — 40000275 ZZH STATISTIC RCP TIME EA 10 MIN

## 2019-02-21 PROCEDURE — 25000125 ZZHC RX 250: Performed by: FAMILY MEDICINE

## 2019-02-21 PROCEDURE — 40000133 ZZH STATISTIC OT WARD VISIT

## 2019-02-21 PROCEDURE — 76770 US EXAM ABDO BACK WALL COMP: CPT

## 2019-02-21 PROCEDURE — 97161 PT EVAL LOW COMPLEX 20 MIN: CPT | Mod: GP | Performed by: PHYSICAL THERAPIST

## 2019-02-21 PROCEDURE — 80053 COMPREHEN METABOLIC PANEL: CPT | Performed by: FAMILY MEDICINE

## 2019-02-21 RX ORDER — GUAIFENESIN 600 MG/1
600 TABLET, EXTENDED RELEASE ORAL 2 TIMES DAILY
Status: DISCONTINUED | OUTPATIENT
Start: 2019-02-21 | End: 2019-02-22 | Stop reason: HOSPADM

## 2019-02-21 RX ADMIN — GUAIFENESIN 600 MG: 600 TABLET, EXTENDED RELEASE ORAL at 12:08

## 2019-02-21 RX ADMIN — ACETAMINOPHEN 1000 MG: 500 TABLET, FILM COATED ORAL at 20:07

## 2019-02-21 RX ADMIN — OXYCODONE HYDROCHLORIDE 5 MG: 5 TABLET ORAL at 20:07

## 2019-02-21 RX ADMIN — OMEPRAZOLE 40 MG: 20 CAPSULE, DELAYED RELEASE ORAL at 08:45

## 2019-02-21 RX ADMIN — ATORVASTATIN CALCIUM 40 MG: 20 TABLET, FILM COATED ORAL at 08:45

## 2019-02-21 RX ADMIN — ACETAMINOPHEN 1000 MG: 500 TABLET, FILM COATED ORAL at 14:11

## 2019-02-21 RX ADMIN — FERROUS SULFATE TAB 325 MG (65 MG ELEMENTAL FE) 325 MG: 325 (65 FE) TAB at 08:45

## 2019-02-21 RX ADMIN — FUROSEMIDE 40 MG: 40 TABLET ORAL at 08:45

## 2019-02-21 RX ADMIN — GABAPENTIN 100 MG: 100 CAPSULE ORAL at 08:44

## 2019-02-21 RX ADMIN — DONEPEZIL HYDROCHLORIDE 5 MG: 5 TABLET ORAL at 08:45

## 2019-02-21 RX ADMIN — GABAPENTIN 200 MG: 100 CAPSULE ORAL at 22:11

## 2019-02-21 RX ADMIN — IPRATROPIUM BROMIDE AND ALBUTEROL SULFATE 3 ML: .5; 3 SOLUTION RESPIRATORY (INHALATION) at 06:25

## 2019-02-21 RX ADMIN — ACETAMINOPHEN 1000 MG: 500 TABLET, FILM COATED ORAL at 08:45

## 2019-02-21 RX ADMIN — PREDNISONE 40 MG: 20 TABLET ORAL at 08:45

## 2019-02-21 RX ADMIN — MEMANTINE 5 MG: 5 TABLET ORAL at 08:46

## 2019-02-21 RX ADMIN — IPRATROPIUM BROMIDE AND ALBUTEROL SULFATE 3 ML: .5; 3 SOLUTION RESPIRATORY (INHALATION) at 19:49

## 2019-02-21 RX ADMIN — IPRATROPIUM BROMIDE AND ALBUTEROL SULFATE 3 ML: .5; 3 SOLUTION RESPIRATORY (INHALATION) at 02:15

## 2019-02-21 RX ADMIN — TIZANIDINE 2 MG: 2 TABLET ORAL at 20:07

## 2019-02-21 RX ADMIN — MICONAZOLE NITRATE: 2 POWDER TOPICAL at 14:14

## 2019-02-21 RX ADMIN — LISINOPRIL 20 MG: 20 TABLET ORAL at 08:45

## 2019-02-21 RX ADMIN — ISOSORBIDE MONONITRATE 30 MG: 30 TABLET, EXTENDED RELEASE ORAL at 08:45

## 2019-02-21 RX ADMIN — Medication 3 MG: at 22:10

## 2019-02-21 RX ADMIN — FELODIPINE 10 MG: 5 TABLET, FILM COATED, EXTENDED RELEASE ORAL at 16:58

## 2019-02-21 RX ADMIN — GUAIFENESIN 600 MG: 600 TABLET, EXTENDED RELEASE ORAL at 20:07

## 2019-02-21 ASSESSMENT — ACTIVITIES OF DAILY LIVING (ADL)
ADLS_ACUITY_SCORE: 26
ADLS_ACUITY_SCORE: 24
ADLS_ACUITY_SCORE: 26
ADLS_ACUITY_SCORE: 24
ADLS_ACUITY_SCORE: 26
ADLS_ACUITY_SCORE: 26

## 2019-02-21 ASSESSMENT — MIFFLIN-ST. JEOR: SCORE: 1354.13

## 2019-02-21 NOTE — PLAN OF CARE
Patient continues on room air with saturation 91-92%. Increases after coughing and using IS. Patient able to pull IS to 500-750. Tested for influenza which was negative. Started on Mucinex. Coughing less today. Tele discontinued. Legs rewrapped by therapy. Dressing on left foot changed, area with broken blister. Renal ultrasound done. Telemetry and neuro checks discontinued. Plan for TCU tomorrow.

## 2019-02-21 NOTE — DISCHARGE INSTRUCTIONS
Appointment with Lymphedema Services: Tuesday, Feb 26th at 10:00. Please arrive 15 minutes earlier. Please call [phone] if you are not able to keep this appointment.    For the breathing  -quafenicin 600 mg 2 times/day for the cough  -continue home inhalers  -prednisone 20 mg daily for 2 more days     For the hip DJD pain  -Tylenol 1000 mg 3 times/day regularly  - add oxycodone 5 mg q6h as needed for pain    For the Lymphedema   -continue Lymphedema clinic therapy   -no evidence of infection at this time.     Discussion today: DNR/DNI

## 2019-02-21 NOTE — PLAN OF CARE
Discharge Planner OT   Patient plan for discharge: TCU  Current status: Improvement since yesterday. Ambulates to toilet and completes toilet transfer all with CGA x1 and FWW with SOB observed when ambulating back to sit EOB. Completes 4 B UE exercises at EOB with no increase symptoms.   Barriers to return to prior living situation: Decreased strength, SOB  Recommendations for discharge: TCU  Rationale for recommendations: Increase independence for ADLs        Entered by: Jenn Irving 02/21/2019 2:50 PM

## 2019-02-21 NOTE — PLAN OF CARE
AOx3, up w/assist of 1 and walker to commode void/incontinent. VSS, on RA. No changes in neuro status. Harsh, productive cough of thick clear sputum. PRN neb x 1 for wheezing following being up to commode, SOB w/activity.BLE compression wraps intact, CMS intact to BLE. Rested intermittently throughout shift.

## 2019-02-21 NOTE — PROGRESS NOTES
Care Transitions Progress Note:    Reason for Follow up:  Disposition. The patient is accepted at [facility] (Phone: [phone] Fax: [phone]) 2/22/19. Discussed the discharge plan with the patient. Transportation will be provided by family.    Discharge Plan:  [facility] vs return to [facility]    Discharge Planner   Discharge Plans in progress: TCU vs return to [facility]  Barriers to discharge plan: Clinical improvement  Follow up plan: Referral placed for Clinic Care Coordination. Follow up with PCP.       Entered by: Melissa Newby 02/21/2019 1:57 PM         Melissa Newby RN, Care Coordinator  [phone]

## 2019-02-21 NOTE — PROGRESS NOTES
"  Physician Attestation   I, Lincoln Brady, was present with the medical student who participated in the service and in the documentation of the note.  I have verified the history and personally performed the physical exam and medical decision making.  I agree with the assessment and plan of care as documented in the note.      I personally reviewed vital signs, medications, labs and imaging.    The exam and assessment/plan were done by me     Lincoln Brady MD  Date of Service (when I saw the patient): 02/22/19    [facility]    Progress Note        Date of Admission:  2/19/2019    Assessment & Plan   Jazmin Clifton is a 86 year old female admitted on 2/19/2019. She has a history of hypertension, COPD, congenital cystic kidney disease, obesity and is admitted for  lightheadedness, weakness and potential left sided facial weakness as well as subjective right sided weakness.          Lightheadedness with potential right sided weakness and left sided facial weakness  ED physician saw left sided facial weakness but no right sided weakness, as noted from EMS.   Concern for stroke.   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 She thinks this got worse yesterday/this am. Family disagreed. History of Bilateral carotid stenosis with endarterectomy on the right. Bubble study done in end of 2017 with no signs of shunting.   CT was negative. On initial exam potentially increased left  sided facial weakness. Later not noticeable.   It is very likely that an acute onset of systemic  disease (hypoxia) is causing her underlying motoric dysfunction from previous stroke to increase in severity.   2/20/2019 - MRI didn't show new ischemia or old infarct. 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 Claims face feels the same as when she had the stroke back in 2017, with weakness and burning sensation.    - New Stroke is ruled out at this point.         Acute Hypoxic resp failure   Patient woke up this am with significant shortness of breath. Short of breath even when sitting, but more severe on exertion. Has known COPD and is previous smoker (quit 25 years ago). On exam bilateral coarse crackles and mild anterior wheezing. Needed 4l of O2 in the ED, O2 sats were 89% initially, recovered to high 90's.   DDx: HF vs Infex vs PE vs COPD exacerbation   PE can't be ruled out at the moment. CT-A seems inadvisable with CKD at the time being.   CXR to check for fluid overload and labs to rule out infectious process. No fever, no WBC but respiratory distress, no cough and no phlegm. Infectious seems unlikely but needs to be r/o. Will start treating for COPD exacerbation. Given one dose lasix 40 mg iv on top of her regular dose of 60 mg /d   2/20/2019 - CXR showed enlarged heart, bilateral atelectasis but no significant signs of fluid overload. CRP of 15.4 BNP and procalcitonin normal.  VBG showed CO2 retention (59) with metabolic compensation, pH 7.34.   Patient feels better with less shortness of breath. She claims having passed large amount of urine. She also claims having relief with duonebs. Wheezing on exam. CT ordered.  Was given additional dose of iv lasix  At this point COPD exacerbation seems most likely. continue steroids 40 mg x 5d + duonebs + albuterol prn   2/21/2019 - CT showed no acute focal infiltrate or pleural effusion. She feels subjectively better. Still complains of a cough. On room air and keeping her saturations.   - guanfacine started to clear up cough   - influenza swab pending      Bilateral leg " edema   Patient presents with recent severe increase of swelling of both legs. She came to ED with wrapped legs from assisted living. She complains of blisters on the left foot, one broke couple of days ago. The swelling has been coming on for several weeks. She was put on Cephalexin on 02/15 for the open blister.  After unwrapping the legs, the ankles and shins looked fairly normal and according to the patient significantly better than before.   DDx: venous insufficiency vs CHF  Patient has history of heart failure in her chart, but normal echo in 2018 with normal pressure of IVC and normal EF. She is on 60 mg furosemide before hospitalization (40 mg in AM 20 mg in pm)   With crackles in lungs and increased sob with bilateral leg edema, decompensated HF sounds very possible. The abx were hold and lymphedema consult was ordered.    2/20/2019 - ECHO showed concentric left ventricular hypertrophy and enlarged left ventricle, as well as enlarger aortic root. Normal IVC pressure. BNP normal, CXR not sufficient for evaluation of pathology. She thinks the legs are even better today. Both legs wrapped during visit. Repeated iv furosemide dose today  2/21/2019 - leg swelling even better and improvement in respiratory status. Will resume to normal lasix oral dose. BUN went up to 34 but creatinine down to 1.33, so seems like she is as dry as can be. No fluid in the lungs seen on the CT.           Recent E. Coli positve urine culture  Patient had UC done on 02/09/19 and showed growth of E. Coli. When asked about urinary symptoms, she denies burning, changes in frequency, change in color or pain. She was put on cephalexin for possible suspected cellulitis. Should cover for UTI as well. No signs of infection with normal WBC, temperature, but might contribute to weakness.   UA now negative for leukocyte esterase and other indicator for infection.    - resolved       Iron deficiency anemia  Patient has low Hb dating back to  2016. She is on iron tablets. In the chart I couldn't find folic acid or iron studies. Vit b12 was checked and normal 10/2018. Hb of 10.2 on admission with MCHC of 29.3.    2019 - Vit B12 normal. Iron deficient. Folic normal.   - continue home iron.  Has been on Iron and serum Iron still only 27.  Would continue this for a month and then recheck iron levels and if no improvement would consider looking for celiac disease and consider iron infusion therapy.   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 This seems to be close to her baseline.   Caution with iv contrast or furosemide   CT showed bilateral lesion which were concerning for radiology. Her brother  of renal cancer and needs further investigation.   - US of the kidneys : pending         Hypertension  Patient has chronic hypertension. She is on ACEi, furosemide and CCB. Since hospitalization her BP have been running > 165 systolic mmHg.   Could be possible that this is secondary HTN due to kidney disease with hypertension refractory to 3 medications.   - continue home meds and observe  - 40 mg furosemide injection once   2019 - reasonably controlled Bp over last 24 hours.   2019 - us of kidneys will also look at renal arteries to see if there is occlusion.   - US of kidneys : pending         Seroma of right groin  Patient presented to the ED 2019 for swelling of the right  groin. She was found to have seroma after initial surgery over a year ago with tumor and  lymph node resection. Subjectively got bigger and more tender, for which she came to the ED on 2019. She was advised to use a compressive garment  (SPANKS) with which she has been noncompliant. Drainage was not advised as it would most likely reoccur.   - use a compressive garment (SPANKS)     Hoarseness and subjective dysphagia   Patient complains of hoarseness on 2/20/2019, especially after coughing. She claims she had this before and it feels like there is an obstruction in her larynx. She also states that she has problems swallowing pills or food from time to time.   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 Not retraceable in the chart.                Diet: 2 Gram Sodium Diet    Fluids: oral   Lines: peripheral   DVT Prophylaxis: Anti-embolisim stockings (TEDs)  Maldonado Catheter: not present  Code Status: DNI      Disposition Plan   Expected discharge: Tomorrow, recommended to transitional care unit once O2 use less than 2 liters/minute.  Entered: Vinny Barron 02/21/2019, 10:13 AM       The patient's care was discussed with the Attending Physician, Dr. Brady.    Vinny Barron  Medical Student  [facility] [employer] [facility]    ______________________________________________________________________    Interval History   Patient is feeling much better. She still has a difficult time getting around. She still complains of a cough but is off the oxygen most of the time. Had several coughing spells yesterday and at night. Improvement in mobility but still relying on help from nursing staff. Urinating a large amount.     Data reviewed today: I reviewed all medications, new labs and imaging results over the last 24 hours. I personally reviewed     CT CHEST W/O CONTRAST 2/20/2019   IMPRESSION:  1.  No acute focal infiltrate or pleural effusion.  2. There are indeterminate bilateral renal lesions. One of particular  concern is seen on the right. Neoplasm is not excluded. Correlation  with ultrasound is recommended.          Physical Exam   Vital Signs: Temp: 97.7  F (36.5  C) Temp src: Oral BP: 153/71 Pulse: 78  Heart Rate: 76 Resp: 16 SpO2: 91 % O2 Device: None (Room air) Oxygen Delivery: 1 LPM  Weight: 208 lbs 5.36 oz     General Appearance: No fever, chills or weakness. Open and friendly in contact. No noticeable distress   Respiratory: Decent air movement, bilateral coarse crackles at the basis, mild anterior wheezing    Cardiovascular: regular rate and rhythm, normal S1 and S2, no murmur, click or rub.   GI: abdomen soft, nontender, no HSM or masses and bowel sounds normal   Skin: no abnormalities or worrisome changes.   MS: no clubbing, cyanosis, edema  Neuro: Possible left sided facial palsy. No weakness or motoric dysfunction of extremities. Sensory intact.           Data   Recent Labs   Lab 02/21/19  0711 02/20/19  0510 02/19/19  0546   WBC 6.4 4.8 4.6   HGB 9.5* 9.2* 10.2*   MCV 90 91 92    209 228   INR  --   --  1.09    141 142   POTASSIUM 4.2 4.7 3.9   CHLORIDE 105 104 106   CO2 32 31 31   BUN 34* 28 26   CR 1.33* 1.50* 1.53*   ANIONGAP 5 6 5   BLAIR 8.7 8.5 8.5   * 121* 92   ALBUMIN 3.1* 2.9* 3.1*   PROTTOTAL 5.9* 5.5* 6.0*   BILITOTAL 0.3 0.3 0.4   ALKPHOS 57 59 69   ALT 14 12 15   AST 14 14 17   TROPI  --   --  <0.015

## 2019-02-21 NOTE — PLAN OF CARE
AOx4, up w/assist of 1 and walker to commode at bedside, or incontinent. No changes within neuro check. Harsh productive cough of thick, clear sputum, SOB w/exertion.  BLE compression wraps intact. Rested intermittently throughout shift.

## 2019-02-21 NOTE — PLAN OF CARE
Discharge Planner PT   Patient plan for discharge: TCU   Current status: Pt required CGA w/ sit> stand. Amb to BR 10 feet x with RW and CGA ; then 15 feet x1 with RW and CGA. SOA( difficulty talking), but spo2 93-95%, resolved at rest     Pt also seen for Lymphedema RX-gradient compression bandages applied form the knees down. Pt reports comfort.  Please remove bandages early if causing pain, change in sensation, not tolerable  and/or soiled.  - will plan to re wrap LEs 2/23/19    Barriers to return to prior living situation: Pt below baseline w/ mobility- typically ambulates within her apartment indep. With the walker     Recommendations for discharge: TCU for continued PT and Lymphedema Therapy     Rationale for recommendations:  see above       Entered by: Honey Espinoza 02/21/2019 2:14 PM

## 2019-02-21 NOTE — PROGRESS NOTES
Patient had an episode of chest pain on her left chest that was sharp.  Patient stated pain was less when she pressed on the area.  Tylenol and oxycodone were given which helped to alleviate the pain.  Patient continues to state she feels sweaty, but has no fever and declines a cool wash cloth or fan.  Also during this episode of pain she reported her head was shaking uncontrollably.  Witnessed this shaking and was able to stop the shaking through distraction.  All neuros during this time were intact.  Will continue to monitor.

## 2019-02-22 ENCOUNTER — PATIENT OUTREACH (OUTPATIENT)
Dept: GERIATRIC MEDICINE | Facility: CLINIC | Age: 84
End: 2019-02-22

## 2019-02-22 VITALS
SYSTOLIC BLOOD PRESSURE: 185 MMHG | DIASTOLIC BLOOD PRESSURE: 78 MMHG | RESPIRATION RATE: 16 BRPM | HEIGHT: 63 IN | OXYGEN SATURATION: 92 % | WEIGHT: 203.71 LBS | TEMPERATURE: 98.6 F | BODY MASS INDEX: 36.09 KG/M2 | HEART RATE: 82 BPM

## 2019-02-22 LAB
ALBUMIN SERPL-MCNC: 3.2 G/DL (ref 3.4–5)
ALP SERPL-CCNC: 67 U/L (ref 40–150)
ALT SERPL W P-5'-P-CCNC: 18 U/L (ref 0–50)
ANION GAP SERPL CALCULATED.3IONS-SCNC: 5 MMOL/L (ref 3–14)
AST SERPL W P-5'-P-CCNC: 10 U/L (ref 0–45)
BILIRUB SERPL-MCNC: 0.4 MG/DL (ref 0.2–1.3)
BUN SERPL-MCNC: 35 MG/DL (ref 7–30)
CALCIUM SERPL-MCNC: 9.2 MG/DL (ref 8.5–10.1)
CHLORIDE SERPL-SCNC: 108 MMOL/L (ref 94–109)
CO2 SERPL-SCNC: 30 MMOL/L (ref 20–32)
CREAT SERPL-MCNC: 1.09 MG/DL (ref 0.52–1.04)
ERYTHROCYTE [DISTWIDTH] IN BLOOD BY AUTOMATED COUNT: 18.7 % (ref 10–15)
GFR SERPL CREATININE-BSD FRML MDRD: 46 ML/MIN/{1.73_M2}
GLUCOSE SERPL-MCNC: 112 MG/DL (ref 70–99)
HCT VFR BLD AUTO: 34.3 % (ref 35–47)
HGB BLD-MCNC: 10.2 G/DL (ref 11.7–15.7)
MCH RBC QN AUTO: 26.8 PG (ref 26.5–33)
MCHC RBC AUTO-ENTMCNC: 29.7 G/DL (ref 31.5–36.5)
MCV RBC AUTO: 90 FL (ref 78–100)
PLATELET # BLD AUTO: 240 10E9/L (ref 150–450)
POTASSIUM SERPL-SCNC: 4.7 MMOL/L (ref 3.4–5.3)
PROT SERPL-MCNC: 6.2 G/DL (ref 6.8–8.8)
RBC # BLD AUTO: 3.81 10E12/L (ref 3.8–5.2)
SODIUM SERPL-SCNC: 143 MMOL/L (ref 133–144)
WBC # BLD AUTO: 6.5 10E9/L (ref 4–11)

## 2019-02-22 PROCEDURE — 25000132 ZZH RX MED GY IP 250 OP 250 PS 637: Performed by: PHYSICIAN ASSISTANT

## 2019-02-22 PROCEDURE — 36415 COLL VENOUS BLD VENIPUNCTURE: CPT | Performed by: FAMILY MEDICINE

## 2019-02-22 PROCEDURE — 80053 COMPREHEN METABOLIC PANEL: CPT | Performed by: FAMILY MEDICINE

## 2019-02-22 PROCEDURE — 25000125 ZZHC RX 250: Performed by: FAMILY MEDICINE

## 2019-02-22 PROCEDURE — 85027 COMPLETE CBC AUTOMATED: CPT | Performed by: FAMILY MEDICINE

## 2019-02-22 PROCEDURE — 99239 HOSP IP/OBS DSCHRG MGMT >30: CPT | Performed by: FAMILY MEDICINE

## 2019-02-22 PROCEDURE — 25000132 ZZH RX MED GY IP 250 OP 250 PS 637: Performed by: FAMILY MEDICINE

## 2019-02-22 PROCEDURE — 94640 AIRWAY INHALATION TREATMENT: CPT

## 2019-02-22 RX ORDER — GUAIFENESIN 600 MG/1
600 TABLET, EXTENDED RELEASE ORAL 2 TIMES DAILY
Qty: 20 TABLET | Refills: 0 | DISCHARGE
Start: 2019-02-22 | End: 2019-03-04

## 2019-02-22 RX ORDER — PREDNISONE 20 MG/1
20 TABLET ORAL DAILY
Qty: 2 TABLET | Refills: 0 | DISCHARGE
Start: 2019-02-22 | End: 2019-02-25

## 2019-02-22 RX ORDER — OXYCODONE HYDROCHLORIDE 5 MG/1
5 TABLET ORAL EVERY 4 HOURS PRN
Qty: 10 TABLET | Refills: 0 | Status: SHIPPED | DISCHARGE
Start: 2019-02-22 | End: 2019-03-04 | Stop reason: DRUGHIGH

## 2019-02-22 RX ORDER — ACETAMINOPHEN 500 MG
1000 TABLET ORAL 3 TIMES DAILY
Qty: 180 TABLET | Refills: 0 | DISCHARGE
Start: 2019-02-22 | End: 2019-04-02

## 2019-02-22 RX ADMIN — ACETAMINOPHEN 1000 MG: 500 TABLET, FILM COATED ORAL at 08:27

## 2019-02-22 RX ADMIN — LISINOPRIL 20 MG: 20 TABLET ORAL at 08:28

## 2019-02-22 RX ADMIN — ATORVASTATIN CALCIUM 40 MG: 20 TABLET, FILM COATED ORAL at 08:28

## 2019-02-22 RX ADMIN — IPRATROPIUM BROMIDE AND ALBUTEROL SULFATE 3 ML: .5; 3 SOLUTION RESPIRATORY (INHALATION) at 11:34

## 2019-02-22 RX ADMIN — FERROUS SULFATE TAB 325 MG (65 MG ELEMENTAL FE) 325 MG: 325 (65 FE) TAB at 08:28

## 2019-02-22 RX ADMIN — ISOSORBIDE MONONITRATE 30 MG: 30 TABLET, EXTENDED RELEASE ORAL at 08:28

## 2019-02-22 RX ADMIN — GUAIFENESIN 600 MG: 600 TABLET, EXTENDED RELEASE ORAL at 08:28

## 2019-02-22 RX ADMIN — GABAPENTIN 100 MG: 100 CAPSULE ORAL at 08:27

## 2019-02-22 RX ADMIN — OMEPRAZOLE 40 MG: 20 CAPSULE, DELAYED RELEASE ORAL at 08:28

## 2019-02-22 RX ADMIN — FUROSEMIDE 40 MG: 40 TABLET ORAL at 08:28

## 2019-02-22 RX ADMIN — MEMANTINE 5 MG: 5 TABLET ORAL at 08:30

## 2019-02-22 RX ADMIN — PREDNISONE 40 MG: 20 TABLET ORAL at 08:28

## 2019-02-22 RX ADMIN — DONEPEZIL HYDROCHLORIDE 5 MG: 5 TABLET ORAL at 08:28

## 2019-02-22 RX ADMIN — ACETAMINOPHEN 1000 MG: 500 TABLET, FILM COATED ORAL at 15:01

## 2019-02-22 ASSESSMENT — ACTIVITIES OF DAILY LIVING (ADL)
ADLS_ACUITY_SCORE: 24

## 2019-02-22 ASSESSMENT — MIFFLIN-ST. JEOR
SCORE: 1303.62
SCORE: 1333.13

## 2019-02-22 ASSESSMENT — PAIN SCALES - GENERAL: PAINLEVEL: SEVERE PAIN (6)

## 2019-02-22 NOTE — DISCHARGE SUMMARY
[facility]ist Discharge Summary    Jazmin Clifton MRN# [id number]   Age: 86 year old YOB: 1932     Date of Admission:  2/19/2019  Date of Discharge::  2/22/2019  Admitting Physician:  Lincoln Brady MD  Discharge Physician:  Lincoln Brady MD  Primary Physician: Junie Estrella       Home clinic:                Discharge Diagnosis:   Principle diagnosis: Acute Hypoxemic Respiratory Failure due to COPD exacerbation     Secondary diagnoses:  Weakness due to copd exacerbation  Chronic lymphedema due to venous insufficiency due to stasis ulcer left 2nd toe   Iron deficient anemia   CKD  Polycystic kidneys  HTN  Seroma right groin  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                  Discharge Instructions:   Appointment with Lymphedema Services: Tuesday, Feb 26th at 10:00. Please arrive 15 minutes earlier. Please call [phone] if you are not able to keep this appointment.    For the breathing  -quafenicin 600 mg 2 times/day for the cough  -continue home inhalers  -prednisone 20 mg daily for 2 more days     For the hip DJD pain  -Tylenol 1000 mg 3 times/day regularly  - add oxycodone 5 mg q6h as needed for pain    For the Lymphedema   -continue Lymphedema clinic therapy   -no evidence of infection at this time.     Discussion today: DNR/DNI           Follow up with primary care provider in 7 days        Procedures:       Results for orders placed or performed during the hospital encounter of 02/19/19   CT Head w/o Contrast    Narrative    CT SCAN OF THE HEAD WITHOUT CONTRAST  2/19/2019 5:59 AM     HISTORY: Focal neuro deficit less than six hours duration, stroke  suspected. Left facial droop. Dizziness/Vertigo.    TECHNIQUE: Axial images of the head and coronal reformations without  IV contrast material. Radiation dose for this scan was reduced using  automated exposure control, adjustment of the mA and/or kV according  to patient size, or iterative reconstruction  technique.    COMPARISON: 7/26/2017.    FINDINGS: There is generalized atrophy of the brain. There is low  attenuation in the white matter of the cerebral hemispheres consistent  with sequelae of small vessel ischemic disease. There is no evidence  of intracranial hemorrhage, mass, acute infarct or anomaly.  Postoperative change in the right temporal lobe.    The visualized portions of the sinuses and mastoids appear normal.  There is no evidence of trauma.      Impression    IMPRESSION: No acute abnormality.    REBECCA WILLS MD   XR Chest 2 Views    Narrative    CHEST TWO VIEW 2/19/2019 5:30 PM     HISTORY: Shortness of breath.    COMPARISON: 7/27/2017    FINDINGS: Suboptimal inspiration with hypoventilatory changes. Again  there is asymmetric elevation of the right hemidiaphragm. Mild  bilateral atelectasis. The degree of cardiac enlargement is similar,  allowing for AP technique. Superior translation of the right humeral  head, presumably related to rotator cuff pathology. Stable dense left  upper lobe nodule, consistent with a granuloma.      Impression    IMPRESSION:  No acute consolidation.    MAYDA GOMEZ MD   MRA Brain ([facility]) wo Contrast    Narrative    MR ANGIOGRAM OF THE HEAD WITHOUT CONTRAST   2/19/2019 4:39 PM     HISTORY: Positional headache. Numbness in the left side of the mouth  that started last night. Bilateral vision loss began yesterday.    TECHNIQUE:  3D time-of-flight MR angiogram of the head without  contrast.    COMPARISON: CT head 2/19/2019    FINDINGS:  There is signal dropout in the right middle cerebral artery  near the bifurcation because of aneurysm clips, but distal branches  appear patent. Distal internal carotid arteries are patent. Left  vertebral artery and basilar artery are ectatic and tortuous but  otherwise patent. Distal right vertebral artery ends in the PICA as a  normal variant.    There are atherosclerotic stenoses in the posterior cerebral  arteries  bilaterally.      Impression    IMPRESSION:    1. Atherosclerotic stenoses in the posterior cerebral arteries  bilaterally.  2. 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      HEIDY BERNARDO MD   MR Brain w/o Contrast    Narrative    MRI BRAIN WITHOUT CONTRAST  2/19/2019 5:02 PM    HISTORY:  Positional headache. Numbness in the left side of the mouth  that started last night. Bilateral vision loss that began yesterday.    TECHNIQUE:  Multiplanar, multisequence MRI of the brain without  gadolinium IV contrast material.    COMPARISON:  10/5/2017    FINDINGS:  There is generalized atrophy of the brain. White matter  changes are seen in the cerebral hemispheres consistent with sequelae  of small vessel ischemic disease. Postoperative changes are seen in  the squamosal portion of the right temporal bone. Artifacts are noted  from aneurysm clips in the right middle cerebral artery bifurcation.  No mass is seen. No acute infarct is visible on the diffusion-weighted  images. A few old microhemorrhages are seen in the posterior right  thalamus and in the central aspect of the right cerebellar hemisphere.    There are bilateral intraocular lens implants. The arteries at the  base of the brain and the dural venous sinuses appear patent.      Impression    IMPRESSION:    1. No evidence of acute infarct, hemorrhage or mass.  2. Brain atrophy and white matter changes consistent with sequelae of  small vessel ischemic disease.  3. Scattered old microhemorrhages in the posterior right thalamus and  the central right cerebellar hemisphere, likely from previous  hypertension.  4. No change.      HEIDY BERNARDO MD   MRA Angiogram Neck w/o Contrast    Narrative    MRA NECK WITHOUT CONTRAST  2/19/2019  4:49 PM     HISTORY: Potential left facial weakness with known carotid stenosis.  Bilateral vision loss. Numbness in the left side of the mouth.  Symptoms started last night  mainly. Right carotid endarterectomy on  8/31/2017.    TECHNIQUE: 2D time-of-flight MR angiogram of the neck without  contrast. Estimates of carotid stenoses are made relative to the  distal internal carotid artery diameters except as noted.    COMPARISON: Ultrasound 12/12/2017    FINDINGS:   Right Carotid: Tortuous internal carotid. No stenosis.    Left Carotid: Tortuous internal carotid. No stenosis.    Vertebral and Basilar: Dominant left vertebral artery. Tiny right  vertebral artery ends in PICA.      Impression    IMPRESSION: Tortuous internal carotid arteries with no significant  stenosis. No change.    HEIDY BERNARDO MD   CT Chest w/o contrast    Narrative    CT CHEST W/O CONTRAST 2/20/2019 12:18 PM    HISTORY: Chronic dyspnea.    TECHNIQUE: CT of the chest is performed without IV contrast.    Assessed structures to the limits no IV contrast administration  include the lungs, mediastinum, pleura, and chest wall.    Radiation exposure is reduced using automated exposure control,  adjustment of the mA and/or kV according to patient size, or iterative  reconstruction technique.    COMPARISON: 4/17/2007.    FINDINGS: Scattered atelectasis is seen in each lung. There is no  significant thickening of interlobular septa. There are no pleural  effusions. No enlarged lymph nodes are demonstrated. Cardiomegaly is  present. The ascending thoracic aorta is upper normal in caliber.  There is a 2.2 cm rounded cortical lesion in the upper pole of the  upper visualized right kidney on axial image 57. Several subcentimeter  hyperdense cortical lesions are seen in the upper visualized left  kidney. The largest one measures 0.9 cm on axial image 57.      Impression    IMPRESSION:  1.  No acute focal infiltrate or pleural effusion.  2. There are indeterminate bilateral renal lesions. One of particular  concern is seen on the right. Neoplasm is not excluded. Correlation  with ultrasound is recommended.    VINCE ARTEAGA MD   US  Retroperitoneal complete    Narrative    ULTRASOUND RENAL COMPLETE February 21, 2019 11:35 AM     HISTORY: Polycystic kidney/renal lesion.    COMPARISON: CT abdomen and pelvis 8/24/2007.    FINDINGS:   Right kidney: Normal overall size and morphology. Normal renal  cortical thickness and echogenicity. There are two benign cysts in the  midpole, new since the prior CT. These measure 2.4 x 1.9 x 2.0 cm and  1.9 x 1.6 x 1.5 cm. No hydronephrosis, calcifications or solid mass  lesions.    Left kidney: Normal overall size and morphology with normal renal  cortical thickness and echogenicity. Five simple cysts are identified,  the largest projecting laterally from the lower pole measuring 3.2 x  2.5 x 2.7 cm. Most of these are new or increased in size since the  prior CT. No hydronephrosis, calcifications or solid mass lesions.    Urinary bladder: Distended urinary bladder is unremarkable.       Impression    IMPRESSION:   1. Multiple benign cysts bilaterally, largest on the left measuring up  to 3.2 cm.  2. No other abnormality.    SUNDEEP COTTRELL MD                    Allergies:      Allergies   Allergen Reactions     Accupril      Ace Inhibitors Unknown     Accupril     Augmentin Unknown     Levofloxacin Unknown, Muscle Pain (Myalgia) and Other (See Comments)     Comment: myalgias, Description:   Pt prescribed ciprofloxacin in Jan 2018 (no rxn documented)  Myalgias       Morphine Other (See Comments)     hallucinations     Nitrofurantoin Nausea and Vomiting and Unknown     Norvasc [Amlodipine Besylate]      Leg swelling       Quinapril Other (See Comments) and Unknown     Hypertension. 3.29.18 - Takes and tolerates lisinopril  Patient reports HTN with as reaction to this medication                    Discharge Medications:     Current Discharge Medication List      START taking these medications    Details   guaiFENesin (MUCINEX) 600 MG 12 hr tablet Take 1 tablet (600 mg) by mouth 2 times daily for 10 days  Qty: 20  tablet, Refills: 0    Associated Diagnoses: Chronic obstructive pulmonary disease, unspecified COPD type (H)      oxyCODONE (ROXICODONE) 5 MG tablet Take 1 tablet (5 mg) by mouth every 4 hours as needed for moderate to severe pain  Qty: 10 tablet, Refills: 0    Associated Diagnoses: Primary osteoarthritis involving multiple joints      predniSONE (DELTASONE) 20 MG tablet Take 20 mg by mouth daily for 2 days.  Qty: 2 tablet, Refills: 0    Comments: Please include the quantity of tablets and (strength) per dose in sig.  Associated Diagnoses: Chronic obstructive pulmonary disease, unspecified COPD type (H)         CONTINUE these medications which have CHANGED    Details   acetaminophen (TYLENOL) 500 MG tablet Take 2 tablets (1,000 mg) by mouth 3 times daily  Qty: 180 tablet, Refills: 0    Associated Diagnoses: Primary osteoarthritis involving multiple joints         CONTINUE these medications which have NOT CHANGED    Details   Alendronate Sodium (FOSAMAX PO) Take 10 mg by mouth once a week Take 1 tab once weekly (Thursday) on an empty stomach with full glass of water. Remain upright for 30 minutes. Wait 30 minutes before eating      Ascorbic Acid (VITAMIN C PO) Take 500 mg by mouth daily      aspirin 81 MG chewable tablet Take 81 mg by mouth      atorvastatin (LIPITOR) 40 MG tablet TAKE 1 TABLET BY MOUTH ONCE DAILY  Qty: 31 tablet, Refills: 98    Associated Diagnoses: Stenosis of right carotid artery      CALCIUM 600+D 600-200 MG-UNIT TABS TAKE 1 TAB BY MOUTH ONCE DAILY  Qty: 30 tablet, Refills: 11    Comments: NO REFILLS REMAIN; PLEASE AUTHORIZE, COVERED BY INSURANCE.THANKS!  Associated Diagnoses: Osteoarthritis, unspecified osteoarthritis type, unspecified site      DONEPEZIL HCL PO Take 5 mg by mouth daily      DULoxetine HCl (CYMBALTA PO) Take 30 mg by mouth daily      ferrous sulfate (FEROSUL) 325 (65 Fe) MG tablet Take 325 mg by mouth daily (with breakfast)      fluticasone (FLONASE) 50 MCG/ACT nasal spray Spray  1 spray into both nostrils 2 times daily      fluticasone-vilanterol (BREO ELLIPTA) 100-25 MCG/INH oral inhaler Inhale 1 puff into the lungs daily      Furosemide (LASIX PO) Take 40 mg by mouth every morning And 20mg q afternoon.      !! GABAPENTIN PO Take 100 mg by mouth every morning      !! GABAPENTIN PO Take 200 mg by mouth At Bedtime       hypromellose-dextran (ARTIFICAL TEARS) 0.1-0.3 % ophthalmic solution Place 1 drop into both eyes every 6 hours as needed for dry eyes      isosorbide mononitrate (IMDUR) 30 MG 24 hr tablet TAKE 1 TABLET BY MOUTH ONCE DAILY  Qty: 31 tablet, Refills: 98    Associated Diagnoses: Coronary artery disease of native heart with stable angina pectoris, unspecified vessel or lesion type (H)      isradipine (DYNACIRC) 5 MG capsule TAKE 1 CAPSULE BY MOUTH TWICE DAILY  Qty: 62 capsule, Refills: 98    Associated Diagnoses: Congestive heart failure, unspecified HF chronicity, unspecified heart failure type (H)      lisinopril (PRINIVIL/ZESTRIL) 10 MG tablet Take 2 tablets (20 mg) by mouth daily  Qty: 30 tablet, Refills: 3    Associated Diagnoses: Essential hypertension, benign      MAGNESIUM OXIDE PO Take 250 mg by mouth daily      melatonin 3 MG CAPS Take 3 mg by mouth At Bedtime      memantine (NAMENDA) 5 MG tablet TAKE 1 TABLET BY MOUTH ONCE DAILY  Qty: 31 tablet, Refills: 98    Associated Diagnoses: Alzheimer's dementia without behavioral disturbance, unspecified timing of dementia onset      Menthol, Topical Analgesic, (BIOFREEZE) 4 % GEL Externally apply topically 4 times daily as needed Apply topically to right hip and buttock 4 times per day. 8am, 2pm, 4pm, and 8pm      nystatin (MYCOSTATIN) 883601 UNIT/GM POWD Apply topically 2 times daily as needed       OMEPRAZOLE PO Take 40 mg by mouth every morning      senna-docusate (SENOKOT-S/PERICOLACE) 8.6-50 MG tablet Take 1 tablet by mouth 2 times daily And 1 tab every day PRN      tiZANidine (ZANAFLEX) 2 MG tablet Take 1 tablet (2 mg)  by mouth 3 times daily as needed for muscle spasms  Qty: 270 tablet, Refills: 0    Associated Diagnoses: Spinal stenosis of lumbar region with neurogenic claudication; Hip pain, right      calcium carbonate (TUMS) 500 MG chewable tablet Take 1 chew tab by mouth every hour as needed for heartburn      levalbuterol (XOPENEX) 1.25 MG/3ML neb solution Take 1 ampule by nebulization every 4 hours as needed for shortness of breath / dyspnea or wheezing And every 4 hours PRN       !! - Potential duplicate medications found. Please discuss with provider.      STOP taking these medications       cephALEXin (KEFLEX) 500 MG capsule Comments:   Reason for Stopping:         guaiFENesin (ROBITUSSIN) 100 MG/5ML SYRP Comments:   Reason for Stopping:         HYDROcodone-acetaminophen (NORCO) 5-325 MG tablet Comments:   Reason for Stopping:         HYDROcodone-acetaminophen (NORCO) 5-325 MG tablet Comments:   Reason for Stopping:                     Consultations:   None           Brief History of Presenting Illness:   Jazmin Clifton is a 86 year old female who has a history of  hypertension, COPD, congenital cystic kidney disease, obesity and is admitted for  lightheadedness, weakness and potential right sided weakness.      Patient woke up this morning feeling very lightheaded and close to passing out. She woke up around 4:30, was significantly short of breath and needed to go to the bathroom. She felt weak on her feet, very lightheaded and insecure walking. She lives in the [facility] and called for assistance. The patient didn't notice any specific weakness in any of her extremities, just a general difficulty walking.   She denies headache, spots in front of her eyes. She experienced nausea with some retching.    She presented to the ED physician who possibly could see facial droop, with which the family disagreed.      The patient also complains of increased swelling of both legs. She has been seen by caretaker at her assisted  "living facility. She was seen by lymphedema. She developed blisters on left foot, one of which broke.                    Hospital Course:   Lightheadedness with potential right sided weakness and left sided facial weakness  ED physician saw left sided facial weakness but no right sided weakness, as noted from EMS.   Concern for stroke.   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 She thinks this got worse yesterday/this am. Family disagreed. History of Bilateral carotid stenosis with endarterectomy on the right. Bubble study done in end of 2017 with no signs of shunting.   CT was negative. On initial exam potentially increased left  sided facial weakness. Later not noticeable.   It is very likely that an acute onset of systemic  disease (hypoxia) is causing her underlying motoric dysfunction from previous stroke to increase in severity.   2/20/2019 - MRI didn't show new ischemia or old infarct. The MRA neck showed \"Tortuous internal carotid arteries with no significant stenosis\" and the MRA brain showed \"atherosclerotic stenoses in the posterior cerebral arteries bilaterally and signal dropout from artifacts from right middle cerebral artery bifurcation aneurysm clips\"   Has subjective c/o left facial weakness but none on exam. Claims face feels the same as when she had the stroke back in 2017, with weakness and burning sensation.    - New Stroke is ruled out at this point.         Acute Hypoxic resp failure   Patient woke up this am with significant shortness of breath. Short of breath even when sitting, but more severe on exertion. Has known COPD and is previous smoker (quit 25 years ago). On exam bilateral coarse crackles and mild anterior wheezing. Needed 4l of O2 in the ED, O2 sats were 89% initially, recovered to high 90's.   DDx: HF vs Infex vs PE vs COPD exacerbation   PE can't be ruled out at the moment. CT-A seems " inadvisable with CKD at the time being.   CXR to check for fluid overload and labs to rule out infectious process. No fever, no WBC but respiratory distress, no cough and no phlegm. Infectious seems unlikely but needs to be r/o. Will start treating for COPD exacerbation. Given one dose lasix 40 mg iv on top of her regular dose of 60 mg /d   2/20/2019 - CXR showed enlarged heart, bilateral atelectasis but no significant signs of fluid overload. CRP of 15.4 BNP and procalcitonin normal.  VBG showed CO2 retention (59) with metabolic compensation, pH 7.34.   Patient feels better with less shortness of breath. She claims having passed large amount of urine. She also claims having relief with duonebs. Wheezing on exam. CT ordered.  Was given additional dose of iv lasix  At this point COPD exacerbation seems most likely. continue steroids 40 mg x 5d + duonebs + albuterol prn   2/21/2019 - CT showed no acute focal infiltrate or pleural effusion. She feels subjectively better. Still complains of a cough. On room air and keeping her saturations.   - guanfacine started to clear up cough   - influenza swab pending      Bilateral leg edema   Patient presents with recent severe increase of swelling of both legs. She came to ED with wrapped legs from assisted living. She complains of blisters on the left foot, one broke couple of days ago. The swelling has been coming on for several weeks. She was put on Cephalexin on 02/15 for the open blister.  After unwrapping the legs, the ankles and shins looked fairly normal and according to the patient significantly better than before.   DDx: venous insufficiency vs CHF  Patient has history of heart failure in her chart, but normal echo in 2018 with normal pressure of IVC and normal EF. She is on 60 mg furosemide before hospitalization (40 mg in AM 20 mg in pm)   With crackles in lungs and increased sob with bilateral leg edema, decompensated HF sounds very possible. The abx were hold and  lymphedema consult was ordered.    2/20/2019 - ECHO showed concentric left ventricular hypertrophy and enlarged left ventricle, as well as enlarger aortic root. Normal IVC pressure. BNP normal, CXR not sufficient for evaluation of pathology. She thinks the legs are even better today. Both legs wrapped during visit. Repeated iv furosemide dose today  2/21/2019 - leg swelling even better and improvement in respiratory status. Will resume to normal lasix oral dose. BUN went up to 34 but creatinine down to 1.33, so seems like she is as dry as can be. No fluid in the lungs seen on the CT.           Recent E. Coli positve urine culture  Patient had UC done on 02/09/19 and showed growth of E. Coli. When asked about urinary symptoms, she denies burning, changes in frequency, change in color or pain. She was put on cephalexin for possible suspected cellulitis. Should cover for UTI as well. No signs of infection with normal WBC, temperature, but might contribute to weakness.   UA now negative for leukocyte esterase and other indicator for infection.    - resolved        Iron deficiency anemia  Patient has low Hb dating back to 01/29/2016. She is on iron tablets. In the chart I couldn't find folic acid or iron studies. Vit b12 was checked and normal 10/2018. Hb of 10.2 on admission with MCHC of 29.3.    2/20/2019 - Vit B12 normal. Iron deficient. Folic normal.   - continue home iron.  Has been on Iron and serum Iron still only 27.  Would continue this for a month and then recheck iron levels and if no improvement would consider looking for celiac disease and consider iron infusion therapy.   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 This seems to be close to her baseline.   Caution with iv contrast or furosemide   CT showed bilateral lesion which were concerning for radiology. Her brother  of renal cancer and needs further investigation.   - US of the kidneys : pending         Hypertension  Patient has chronic hypertension. She is on ACEi, furosemide and CCB. Since hospitalization her BP have been running > 165 systolic mmHg.   Could be possible that this is secondary HTN due to kidney disease with hypertension refractory to 3 medications.   - continue home meds and observe  - 40 mg furosemide injection once   2019 - reasonably controlled Bp over last 24 hours.   2019 - us of kidneys will also look at renal arteries to see if there is occlusion.   - US of kidneys : pending         Seroma of right groin  Patient presented to the ED 2019 for swelling of the right  groin. She was found to have seroma after initial surgery over a year ago with tumor and  lymph node resection. Subjectively got bigger and more tender, for which she came to the ED on 2019. She was advised to use a compressive garment (SPANKS) with which she has been noncompliant. Drainage was not advised as it would most likely reoccur.   - use a compressive garment (SPANKS)     Hoarseness and subjective dysphagia   Patient complains of hoarseness on 2019, especially after coughing. She claims she had this before and it feels like there is an obstruction in her larynx. She also states that she has problems swallowing pills or food from time to time.   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 Not retraceable in the chart.                          Discharge Exam:   OBJECTIVE:   /78 (BP Location: Right arm)   Pulse 82   Temp 98.6  F (37  C) (Oral)   Resp 16   Ht 1.6 m (5' 3\")   Wt 92.4 kg (203 lb 11.3 oz)   SpO2 92%   BMI 36.08 kg/m      GENERAL APPEARANCE:  Alert, NAD, Ox3    "  RESP:clear      CV: regular rates and rhythm,no murmur, no click or rub - no edema     Abdomen: soft, nontender, no liver or spleen enlargement, no masses, BSs normal   Skin: no cyanosis, pallor, or jaundice            Pending Tests at Discharge:     Unresulted Labs Ordered in the Past 30 Days of this Admission     No orders found from 12/21/2018 to 2/20/2019.                   Discharge Disposition:   Discharged to rehabilitation facility      Attestation:  Amount of time performed on this discharge : 45 minutes.    Lincoln Brady MD

## 2019-02-22 NOTE — PROGRESS NOTES
2-22-19   CC received notice from RN at [facility] that member is going to be discharged back to AL today.  CC did review Epic and found that member is scheduled to see lyphedema clinic next week.  CC did leave VM for RN office about this and asked if HC would be appropriate for this and also if member would need a ride to let CC know.      CC will plan to reach out to member and family next week when she is back at the AL to review transfer CP and paperwork and offer visit if needed.     CC was then reviewing UTF and also RS tool and found that member RS tool from another AL and no other RS tool has been generated.     Member became part of [facility] 2-1-19.  She moved into [facility] on  AL 12-19-18 and has been in the TCU much of this time after this.     CC did review RS tool and found this was completed under a different AL.  CC will then update this to reflect [facility] on .    CC also then reached out to SW at [facility] to let her know that CC was involved and asked for updated about discharge.  CC will then see if entire new assessment needs to be completed once discharge plans are determined.     Mirian Weldon MA [facility] Care Coordinator   [phone]

## 2019-02-22 NOTE — PROGRESS NOTES
[facility] DISCHARGE NOTE    Patient discharged to transitional care unit at 3:32 PM via wheel chair. Accompanied by son and staff. Discharge instructions reviewed with patient, opportunity offered to ask questions. Prescriptions sent with patient. All belongings sent with patient. Report called and given to Josephine at 1530 today.    Yady Burgos

## 2019-02-22 NOTE — PHARMACY - DISCHARGE MEDICATION RECONCILIATION
Discharge medication review for this patient is complete. Pharmacist assisted with medication reconciliation of discharge medications with prior to admission medications.     The following changes were made to the discharge medication list based on pharmacist review:  Added:  none  Discontinued: none  Changed: none      Patient's Discharge Medication List  - medications as listed on After Visit Summary (AVS)     Review of your medicines      START taking      Dose / Directions   guaiFENesin 600 MG 12 hr tablet  Commonly known as:  MUCINEX  Replaces:  guaiFENesin 100 MG/5ML Syrp      Dose:  600 mg  Take 1 tablet (600 mg) by mouth 2 times daily for 10 days  Quantity:  20 tablet  Refills:  0     oxyCODONE 5 MG tablet  Commonly known as:  ROXICODONE      Dose:  5 mg  Take 1 tablet (5 mg) by mouth every 4 hours as needed for moderate to severe pain  Quantity:  10 tablet  Refills:  0     predniSONE 20 MG tablet  Commonly known as:  DELTASONE      Dose:  20 mg  Take 20 mg by mouth daily for 2 days.  Quantity:  2 tablet  Refills:  0        CONTINUE these medicines which may have CHANGED, or have new prescriptions. If we are uncertain of the size of tablets/capsules you have at home, strength may be listed as something that might have changed.      Dose / Directions   acetaminophen 500 MG tablet  Commonly known as:  TYLENOL  This may have changed:      medication strength    how much to take    when to take this    reasons to take this      Dose:  1000 mg  Take 2 tablets (1,000 mg) by mouth 3 times daily  Quantity:  180 tablet  Refills:  0     atorvastatin 40 MG tablet  Commonly known as:  LIPITOR  This may have changed:      how much to take    how to take this    when to take this  Used for:  Stenosis of right carotid artery      TAKE 1 TABLET BY MOUTH ONCE DAILY  Quantity:  31 tablet  Refills:  98        CONTINUE these medicines which have NOT CHANGED      Dose / Directions   aspirin 81 MG chewable tablet  Commonly known  as:  ASA      Dose:  81 mg  Take 81 mg by mouth  Refills:  0     BIOFREEZE 4 % Gel  Generic drug:  Menthol (Topical Analgesic)      Externally apply topically 4 times daily as needed Apply topically to right hip and buttock 4 times per day. 8am, 2pm, 4pm, and 8pm  Refills:  0     BREO ELLIPTA 100-25 MCG/INH inhaler  Generic drug:  fluticasone-vilanterol      Dose:  1 puff  Inhale 1 puff into the lungs daily  Refills:  0     CALCIUM 600+D 600-200 MG-UNIT Tabs  Used for:  Osteoarthritis, unspecified osteoarthritis type, unspecified site  Generic drug:  calcium carbonate-vitamin D      TAKE 1 TAB BY MOUTH ONCE DAILY  Quantity:  30 tablet  Refills:  11     calcium carbonate 500 MG chewable tablet  Commonly known as:  TUMS      Dose:  1 chew tab  Take 1 chew tab by mouth every hour as needed for heartburn  Refills:  0     CYMBALTA PO      Dose:  30 mg  Take 30 mg by mouth daily  Refills:  0     DONEPEZIL HCL PO      Dose:  5 mg  Take 5 mg by mouth daily  Refills:  0     ferrous sulfate 325 (65 Fe) MG tablet  Commonly known as:  FEROSUL      Dose:  325 mg  Take 325 mg by mouth daily (with breakfast)  Refills:  0     fluticasone 50 MCG/ACT nasal spray  Commonly known as:  FLONASE      Dose:  1 spray  Spray 1 spray into both nostrils 2 times daily  Refills:  0     FOSAMAX PO      Dose:  10 mg  Take 10 mg by mouth once a week Take 1 tab once weekly (Thursday) on an empty stomach with full glass of water. Remain upright for 30 minutes. Wait 30 minutes before eating  Refills:  0     * GABAPENTIN PO      Dose:  200 mg  Take 200 mg by mouth At Bedtime  Refills:  0     * GABAPENTIN PO      Dose:  100 mg  Take 100 mg by mouth every morning  Refills:  0     hypromellose-dextran 0.1-0.3 % ophthalmic solution  Commonly known as:  ARTIFICAL TEARS      Dose:  1 drop  Place 1 drop into both eyes every 6 hours as needed for dry eyes  Refills:  0     isosorbide mononitrate 30 MG 24 hr tablet  Commonly known as:  IMDUR  Used for:  Coronary  artery disease of native heart with stable angina pectoris, unspecified vessel or lesion type (H)      TAKE 1 TABLET BY MOUTH ONCE DAILY  Quantity:  31 tablet  Refills:  98     isradipine 5 MG capsule  Commonly known as:  DYNACIRC      TAKE 1 CAPSULE BY MOUTH TWICE DAILY  Quantity:  62 capsule  Refills:  98     LASIX PO  Indication:  High Blood Pressure Disorder      Dose:  40 mg  Take 40 mg by mouth every morning And 20mg q afternoon.  Refills:  0     levalbuterol 1.25 MG/3ML neb solution  Commonly known as:  XOPENEX      Dose:  1 ampule  Take 1 ampule by nebulization every 4 hours as needed for shortness of breath / dyspnea or wheezing And every 4 hours PRN  Refills:  0     lisinopril 10 MG tablet  Commonly known as:  PRINIVIL/ZESTRIL      Dose:  20 mg  Take 2 tablets (20 mg) by mouth daily  Quantity:  30 tablet  Refills:  3     MAGNESIUM OXIDE PO      Dose:  250 mg  Take 250 mg by mouth daily  Refills:  0     melatonin 3 MG Caps      Dose:  3 mg  Take 3 mg by mouth At Bedtime  Refills:  0     memantine 5 MG tablet  Commonly known as:  NAMENDA  Used for:  Alzheimer's dementia without behavioral disturbance, unspecified timing of dementia onset      TAKE 1 TABLET BY MOUTH ONCE DAILY  Quantity:  31 tablet  Refills:  98     nystatin 472364 UNIT/GM external powder  Commonly known as:  MYCOSTATIN      Apply topically 2 times daily as needed  Refills:  0     OMEPRAZOLE PO  Indication:  Gastroesophageal Reflux Disease      Dose:  40 mg  Take 40 mg by mouth every morning  Refills:  0     senna-docusate 8.6-50 MG tablet  Commonly known as:  SENOKOT-S/PERICOLACE      Dose:  1 tablet  Take 1 tablet by mouth 2 times daily And 1 tab every day PRN  Refills:  0     tiZANidine 2 MG tablet  Commonly known as:  ZANAFLEX  Used for:  Spinal stenosis of lumbar region with neurogenic claudication, Hip pain, right      Dose:  2 mg  Take 1 tablet (2 mg) by mouth 3 times daily as needed for muscle spasms  Quantity:  270 tablet  Refills:   0     VITAMIN C PO      Dose:  500 mg  Take 500 mg by mouth daily  Refills:  0         * This list has 2 medication(s) that are the same as other medications prescribed for you. Read the directions carefully, and ask your doctor or other care provider to review them with you.            STOP taking    cephALEXin 500 MG capsule  Commonly known as:  KEFLEX        guaiFENesin 100 MG/5ML Syrp  Commonly known as:  ROBITUSSIN  Replaced by:  guaiFENesin 600 MG 12 hr tablet        HYDROcodone-acetaminophen 5-325 MG tablet  Commonly known as:  NORCO              Where to get your medicines      Information about where to get these medications is not yet available    Ask your nurse or doctor about these medications    oxyCODONE 5 MG tablet

## 2019-02-22 NOTE — PLAN OF CARE
Physical Therapy Discharge Summary    Reason for therapy discharge:    Discharged to transitional care facility.    Progress towards therapy goal(s). See goals on Care Plan in [facility] electronic health record for goal details.  Goals partially met.  Barriers to achieving goals:   Weakness.     Pt declined  PT today-discharging    Therapy recommendation(s):    Continued therapy is recommended.  Rationale/Recommendations:  continued PT at TCU to assist pt in retuning to independence w/ mobility.          Pt also to continue w/ Lymphedema at TCU ,  Compression wraps need to be changed 2/23/19

## 2019-02-22 NOTE — PLAN OF CARE
"Pt alert, oriented, assist 1 with walker. Prn oxycodone given for back and hip pain. Pt has infrequent cough. Pt slept well overnight. No bm overnight. Pt denies dizziness, lightheadedness. No reports of chest pain. BLLE wrapped. /59 (BP Location: Right arm)   Pulse 70   Temp 98.3  F (36.8  C) (Oral)   Resp 16   Ht 1.6 m (5' 3\")   Wt 94.5 kg (208 lb 5.4 oz)   SpO2 92%   BMI 36.90 kg/m      "

## 2019-02-22 NOTE — PROGRESS NOTES
Care Transitions Discharge:    Name: Jazmin Clifton    MRN: [id number]    Reason for Hospitalization: Dizziness [R42]  Facial droop [R29.810]  Renal insufficiency [N28.9]  Anemia, unspecified type [D64.9]    Cognitive/Behavioral Status: awake, alert and oriented    Follow-up Appointments: No future appointments.    Discharge Date:  2/22/2019    Patient/Care Partner in agreement and understands the discharge plan:  Yes    Discharge Disposition:  [place] [facility] (Phone: [phone] Fax: [phone]). Transportation will be provided by dale Lucius.    Discharge Planner   Discharge Plans in progress: TCU  Barriers to discharge plan: None  Follow up plan: Referral placed for Clinic Care Coordination. Follow up with PCP.       Entered by: Melissa Newby 02/22/2019 11:19 AM         Melissa Newby RN Care Coordinator  [phone]

## 2019-02-22 NOTE — PLAN OF CARE
Occupational Therapy Discharge Summary    Reason for therapy discharge:    Discharged to transitional care facility.    Progress towards therapy goal(s). See goals on Care Plan in [facility] electronic health record for goal details.  Goals partially met.  Barriers to achieving goals:   discharge from facility.    Therapy recommendation(s):    Continued therapy is recommended.  Rationale/Recommendations:  TCU to increase independence with ADLs and functional mobility .

## 2019-02-23 ENCOUNTER — TELEPHONE (OUTPATIENT)
Dept: GERIATRICS | Facility: CLINIC | Age: 84
End: 2019-02-23

## 2019-02-23 VITALS
HEIGHT: 63 IN | DIASTOLIC BLOOD PRESSURE: 89 MMHG | BODY MASS INDEX: 34.94 KG/M2 | TEMPERATURE: 97.7 F | SYSTOLIC BLOOD PRESSURE: 159 MMHG | RESPIRATION RATE: 17 BRPM | WEIGHT: 197.2 LBS | HEART RATE: 80 BPM | OXYGEN SATURATION: 95 %

## 2019-02-23 NOTE — TELEPHONE ENCOUNTER
Resident complaining of left upper extremity pain and thought she slept wrong on it.  This afternoon still having difficult ROM    Ok to get xray of left upper extremity    Electronically signed by Yesy Bang RN, CNP

## 2019-02-23 NOTE — PROGRESS NOTES
[facility]  PRIMARY CARE PROVIDER AND CLINIC:  Junie Estrella [address] [address] / [facility] [id number]  Chief Complaint   Patient presents with     Hospital F/U     [facility] Record Number:  [id number]  Place of Service where encounter took place:  [facility] [[id number]]    HPI:    Jazmin Clifton is a 86 year old  (12/30/1932),admitted to the above facility from  [facility].  Hospital stay February 19, 2019 through February 22, 2019  Admitted to this facility for  rehab, medical management and nursing care.  HPI information obtained from: facility chart records, facility staff, patient report and [facility] chart review.  Current issues are:        Jazmin Clifton an 86 year old female who has a history of  hypertension, COPD, congenital cystic kidney disease, obesity, CHF, dementia who woke early AM of 2/19 with SOB, lightheadedness and dizziness and was sent from her [facility] to the ED. She was initially noted to have a left sided facial droop, which resolved rapidly as well as hypoxia requiring 4L of oxygen. She was noted to have significant wheezing. MRI on 2/20 negative for new ischemia. CXR showed atelectasis, no fluid overload. She was given a dose of IV lasix, steroids and nebs with improvement. She was weaned to RA. Lymphwraps for BLE edema were continued. When medically stable she was discharged back to TCU for further rehab and medical management.        COPD exacerbation (H)  Congestive heart failure, unspecified HF chronicity, unspecified heart failure type (H)  Essential hypertension, benign  History of CVA (cerebrovascular accident)  CKD (chronic kidney disease) stage 3, GFR 30-59 ml/min (H)  Iron deficiency anemia, unspecified iron deficiency anemia type  Spinal stenosis of lumbar region with neurogenic claudication  S/P lumbar laminectomy  Congenital cystic kidney disease  Right groin mass  Pain of left upper  "extremity  Physical deconditioning   Reports she is feeling \"great\" today. Denies any SOB, has not been coughing, has not noted any wheezing. Slept well last night. She has been tolerating her lymphwraps and is happy with how much the swelling in her legs has improved. Reports only very mild low back pain. She is supposed to have f/u with neurosurgery today but she is unsure if a ride has been arranged and may not be able to make the apt. She does have some pain in her left arm - had x-ray of shoulder and wrist over the weekend, both of which were negative. States pain in her arm is from when a resident fell into her a about 3 weeks ago in the elevator at her facility. She had constipation while in the hospital, but had loose stools yesterday, and a normal BM today - she does not want to be on any scheduled laxatives. She states she feels ready to go back to her apartment, but dose feel that she would benefit some from therapy.     CODE STATUS/ADVANCE DIRECTIVES DISCUSSION:   DNR / DNI  Patient's living condition: lives alone    ALLERGIES:Accupril; Ace inhibitors; Augmentin; Levofloxacin; Morphine; Nitrofurantoin; Norvasc [amlodipine besylate]; and Quinapril  PAST MEDICAL HISTORY:  has a past medical history of ABDOMINAL PAIN GENERALIZED (3/15/2006), Abdominal pain, generalized (3/15/2006), Atherosclerosis of renal artery (H), BENIGN HYPERTENSION (5/1/2006), Benign neoplasm of scalp and skin of neck, Cerebral aneurysm, nonruptured, Depressive disorder, not elsewhere classified, Esophageal reflux, Female stress incontinence, Gastrointestinal malfunction arising from mental factors, Generalized osteoarthrosis, unspecified site, Herpes zoster dermatitis of eyelid, Insomnia, unspecified, Lumbago, Other chest pain, Other specified cardiac dysrhythmias(427.89), Rectocele, Unspecified disorder of kidney and ureter, and Unspecified essential hypertension.  PAST SURGICAL HISTORY:  has a past surgical history that includes " surgical history of - ; surgical history of - ; surgical history of -  (1996); surgical history of - ; surgical history of - ; cholecystectomy, laporoscopic (1997); hysterectomy, mirtha (1982); and Endarterectomy carotid (Right, 8/31/2017).  FAMILY HISTORY: family history includes Asthma in her son; Cancer in her brother and mother; Cerebrovascular Disease in her father; Diabetes in her son; Eye Disorder in her son; Gastrointestinal Disease in her son; Heart Disease in her father.  SOCIAL HISTORY:  reports that she has quit smoking. She does not have any smokeless tobacco history on file. She reports that she drinks alcohol. She reports that she does not use drugs.    Post Discharge Medication Reconciliation Status: discharge medications reconciled and changed, per note/orders (see AVS).  Current Outpatient Medications   Medication Sig Dispense Refill     acetaminophen (TYLENOL) 500 MG tablet Take 2 tablets (1,000 mg) by mouth 3 times daily 180 tablet 0     Alendronate Sodium (FOSAMAX PO) Take 70 mg by mouth once a week Take 1 tab once weekly (Thursday) on an empty stomach with full glass of water. Remain upright for 30 minutes. Wait 30 minutes before eating       Ascorbic Acid (VITAMIN C PO) Take 500 mg by mouth daily       aspirin 81 MG chewable tablet Take 81 mg by mouth       atorvastatin (LIPITOR) 40 MG tablet TAKE 1 TABLET BY MOUTH ONCE DAILY (Patient taking differently: TAKE 1 TABLET BY MOUTH at bedtime) 31 tablet 98     CALCIUM 600+D 600-200 MG-UNIT TABS TAKE 1 TAB BY MOUTH ONCE DAILY 30 tablet 11     calcium carbonate (TUMS) 500 MG chewable tablet Take 1 chew tab by mouth every hour as needed for heartburn       DONEPEZIL HCL PO Take 5 mg by mouth daily       DULoxetine HCl (CYMBALTA PO) Take 30 mg by mouth daily       ferrous sulfate (FEROSUL) 325 (65 Fe) MG tablet Take 325 mg by mouth 2 times daily       ferrous sulfate (FEROSUL) 325 (65 Fe) MG tablet Take 325 mg by mouth daily (with breakfast)        fluticasone (FLONASE) 50 MCG/ACT nasal spray Spray 1 spray into both nostrils 2 times daily       fluticasone-vilanterol (BREO ELLIPTA) 100-25 MCG/INH oral inhaler Inhale 1 puff into the lungs daily       Furosemide (LASIX PO) Take 20 mg by mouth every morning        furosemide (LASIX) 40 MG tablet Take 40 mg by mouth daily       GABAPENTIN PO Take 100 mg by mouth every morning       GABAPENTIN PO Take 200 mg by mouth At Bedtime        guaiFENesin (MUCINEX) 600 MG 12 hr tablet Take 1 tablet (600 mg) by mouth 2 times daily for 10 days 20 tablet 0     hypromellose-dextran (ARTIFICAL TEARS) 0.1-0.3 % ophthalmic solution Place 1 drop into both eyes every 6 hours as needed for dry eyes       isosorbide mononitrate (IMDUR) 30 MG 24 hr tablet TAKE 1 TABLET BY MOUTH ONCE DAILY 31 tablet 98     isradipine (DYNACIRC) 5 MG capsule TAKE 1 CAPSULE BY MOUTH TWICE DAILY 62 capsule 98     levalbuterol (XOPENEX) 1.25 MG/3ML neb solution Take 1 ampule by nebulization every 4 hours as needed for shortness of breath / dyspnea or wheezing And every 4 hours PRN       lisinopril (PRINIVIL/ZESTRIL) 10 MG tablet Take 2 tablets (20 mg) by mouth daily 30 tablet 3     MAGNESIUM OXIDE PO Take 250 mg by mouth daily       melatonin 3 MG CAPS Take 3 mg by mouth At Bedtime       memantine (NAMENDA) 5 MG tablet TAKE 1 TABLET BY MOUTH ONCE DAILY 31 tablet 98     Menthol, Topical Analgesic, (BIOFREEZE) 4 % GEL Externally apply topically 4 times daily as needed Apply topically to right hip and buttock 4 times per day. 8am, 2pm, 4pm, and 8pm       nystatin (MYCOSTATIN) 457360 UNIT/GM POWD Apply topically 2 times daily as needed        OMEPRAZOLE PO Take 40 mg by mouth every morning       oxyCODONE (ROXICODONE) 5 MG tablet Take 1 tablet (5 mg) by mouth every 4 hours as needed for moderate to severe pain 10 tablet 0     senna-docusate (SENOKOT-S/PERICOLACE) 8.6-50 MG tablet Take 1 tablet by mouth daily       senna-docusate (SENOKOT-S/PERICOLACE) 8.6-50 MG  "tablet Take 1 tablet by mouth 2 times daily as needed And 1 tab every day PRN        tiZANidine (ZANAFLEX) 2 MG tablet Take 2 mg by mouth as needed for muscle spasms       tiZANidine (ZANAFLEX) 2 MG tablet Take 1 tablet (2 mg) by mouth 3 times daily as needed for muscle spasms 270 tablet 0       ROS:  10 point ROS of systems including Constitutional, Eyes, Respiratory, Cardiovascular, Gastroenterology, Genitourinary, Integumentary, Musculoskeletal, Psychiatric were all negative except for pertinent positives noted in my HPI.    Exam:  /89   Pulse 80   Temp 97.7  F (36.5  C)   Resp 17   Ht 1.6 m (5' 3\")   Wt 89.4 kg (197 lb 3.2 oz)   SpO2 95%   BMI 34.93 kg/m    GENERAL APPEARANCE:  Alert, in no distress, oriented, morbidly obese  RESP:  respiratory effort and palpation of chest normal, lungs clear to auscultation , no respiratory distress, on RA, no cough noted  CV:  Palpation and auscultation of heart done , regular rate and rhythm, no murmur, rub, or gallop, peripheral edema 2+ in BLE  ABDOMEN:  normal bowel sounds, soft, nontender, no hepatosplenomegaly or other masses, no guarding or rebound  LYMPHATICS:  Large right groin seroma  M/S:   decreased ROM to left shoulder, mild tenderness to left bicep, small steps, decreased ROM to bilat hips, slightlt unsteady gait  SKIN:  Inspection of skin and subcutaneous tissue baseline  NEURO:   Cranial nerves 2-12 are normal tested and grossly at patient's baseline  PSYCH:  oriented X 3, normal insight, judgement and memory, affect and mood normal    Lab/Diagnostic data:     CBC RESULTS:   Recent Labs   Lab Test 02/22/19  0622 02/21/19  0711   WBC 6.5 6.4   RBC 3.81 3.53*   HGB 10.2* 9.5*   HCT 34.3* 31.8*   MCV 90 90   MCH 26.8 26.9   MCHC 29.7* 29.9*   RDW 18.7* 18.8*    223       Last Basic Metabolic Panel:  Recent Labs   Lab Test 02/22/19  0622 02/21/19  0711    142   POTASSIUM 4.7 4.2   CHLORIDE 108 105   BLAIR 9.2 8.7   CO2 30 32   BUN 35* 34* " "  CR 1.09* 1.33*   * 115*       Liver Function Studies -   Recent Labs   Lab Test 02/22/19  0622 02/21/19  0711   PROTTOTAL 6.2* 5.9*   ALBUMIN 3.2* 3.1*   BILITOTAL 0.4 0.3   ALKPHOS 67 57   AST 10 14   ALT 18 14       TSH   Date Value Ref Range Status   08/14/2017 0.62 0.40 - 4.00 mU/L Final   04/18/2007 0.60 0.4 - 5.0 mU/L Final   ]    Lab Results   Component Value Date    A1C 6.2 08/31/2017       ASSESSMENT/PLAN:  (J44.1) COPD exacerbation (H)  (primary encounter diagnosis)  Comment: hypoxia, wheezing on admission. CXR showed atelectasis. CT showed no infiltrate or pleural effusion. Improved with lasix, steroids, nebs. Now on RA, no SOB noted, no coughing or wheezing noted.   Plan: Completed 5 day prednisone burst (last days today), continue Xopenex nebs PRN, flonase nasal spray BID, Breo ellipta  1 puff daily, Mucinex 600mg BID, monitor and adjust     (I50.9) Congestive heart failure, unspecified HF chronicity, unspecified heart failure type (H)  Comment: No s/s of exacerbation - no fluid overload noted on CXR, BNP WNL. Echo 2/19 showed ED of 55-60%, mild LVH, grade II diastolic dysfunction. Did improve with IV lasix. Has chronic LE edema, has been follow with lymphedema.   Plan: Continue lasix 40mg q AM, 20mg q afternoon, lisinopril 20mg daily, imdur 30mg daily, ASA 81mg daily, atorvastatin 40mg daily. Daily weights, low salt diet.     (I10) Essential hypertension, benign  Comment: Elevated during recent hospital stay, renal US done as concern r/t kidney disease; US showed benign cysts, no other abnormalities. BP in 's-170's  Plan: Continue imdur 30mg daily, lisinopril 20mg daily, isradipine 5mg daily, lasix as above. Continue to monitor and adjust.     (Z86.73) History of CVA (cerebrovascular accident)  Comment: At neurological baseline. Head CT negative.  MRI negative for new ischemia or old infarct. 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   Plan: Continue ASA, statin.     (N18.3) CKD (chronic kidney disease) stage 3, GFR 30-59 ml/min (H)  (Q61.9) Congenital cystic kidney disease  Comment: Baseline 1.2-1.5. Renal US showed benign cysts, otherwise negative. Creatinine stable during recent hospitalization  Plan: BMP to monitor for stability, avoid nephrotoxic medications.     (D50.9) Iron deficiency anemia, unspecified iron deficiency anemia type  Comment: Normal B12 and folate, iron low at 27, baseline Hbg ~10  Plan: Will increase iron supplement to BID. Recheck in 4-6 weeks.     (M48.062) Spinal stenosis of lumbar region with neurogenic claudication  (Z98.890) S/P lumbar laminectomy on 1//9/19  Comment: reports only very mild pain today, was changed from Norco TID and to oxycodone PRN while IP. Of note, she is on steroids, so ? If this is contributing to improved pain as she has not used narcotics recently.   Plan: Healing well, no s/s of infection. Unclear if able to go to f/u apt with neurosurgery today d/t ride. Continue APAP 1000mg TID, oxycodone 5mg q6h PRN, tizanidine 2mg TID PRN, PT/OT, monitor and adjust     (R19.09) Right groin mass - seroma  Comment: Diagnosed via US on 10/2018. Was seen be genera surgery and vascular surgery - felt to be result of lypmhnode involvement in previous surgery to remove groin mass. She was recommended to wear Spanks compression; did not feel draining would be beneficial as it is likely to re-ocurr. Stable, she feels it is better. Has been non-compliant with spanks/compression  Plan: Compression as tolerated    (M79.602) Pain of left upper extremity  Comment: X-ray's negative, likely soft tissue injury from other resident falling into her on the elevator at her facility   Plan: " Continue pain regimen as above, PT/OT    (R53.81) Physical deconditioning  Comment:   Plan: PT/OT eval and treat. Discharge planning per their recommendation    Orders:  1. Change Fosamax to 70 mg PO q 7 days. Dx: Osteoporosis.  2. Increase iron supplement to 325 mg PO BID. Dx: Anemia  3. Discontinue scheduled senna - s  Per pt request.  4. Increase Senna-S PRN to 1 tab PO BID PRN. Dx: Constipation  5. BMP, CBC on 2/27/19. Dx: CKD, anemia    Total time spent with patient visit at the skilled nursing facility was 35 min including patient visit and review of past records. Greater than 50% of total time spent with counseling and coordinating care due to discussion of plan of care and patient education d/t CHF, COPD, seroma, recent back surgery, CKD    Electronically signed by:  MARY Gómez CNP

## 2019-02-25 ENCOUNTER — ASSISTED LIVING VISIT (OUTPATIENT)
Dept: GERIATRICS | Facility: CLINIC | Age: 84
End: 2019-02-25
Payer: COMMERCIAL

## 2019-02-25 DIAGNOSIS — R53.81 PHYSICAL DECONDITIONING: ICD-10-CM

## 2019-02-25 DIAGNOSIS — Z98.890 S/P LUMBAR LAMINECTOMY: ICD-10-CM

## 2019-02-25 DIAGNOSIS — I10 ESSENTIAL HYPERTENSION, BENIGN: ICD-10-CM

## 2019-02-25 DIAGNOSIS — J44.1 COPD EXACERBATION (H): Primary | ICD-10-CM

## 2019-02-25 DIAGNOSIS — M79.602 PAIN OF LEFT UPPER EXTREMITY: ICD-10-CM

## 2019-02-25 DIAGNOSIS — I50.9 CONGESTIVE HEART FAILURE, UNSPECIFIED HF CHRONICITY, UNSPECIFIED HEART FAILURE TYPE (H): ICD-10-CM

## 2019-02-25 DIAGNOSIS — D50.9 IRON DEFICIENCY ANEMIA, UNSPECIFIED IRON DEFICIENCY ANEMIA TYPE: ICD-10-CM

## 2019-02-25 DIAGNOSIS — R19.09 RIGHT GROIN MASS: ICD-10-CM

## 2019-02-25 DIAGNOSIS — Z86.73 HISTORY OF CVA (CEREBROVASCULAR ACCIDENT): ICD-10-CM

## 2019-02-25 DIAGNOSIS — N18.30 CKD (CHRONIC KIDNEY DISEASE) STAGE 3, GFR 30-59 ML/MIN (H): ICD-10-CM

## 2019-02-25 DIAGNOSIS — M48.062 SPINAL STENOSIS OF LUMBAR REGION WITH NEUROGENIC CLAUDICATION: ICD-10-CM

## 2019-02-25 DIAGNOSIS — Q61.9 CONGENITAL CYSTIC KIDNEY DISEASE: ICD-10-CM

## 2019-02-25 PROCEDURE — 99207 ZZC CDG-MDM COMPONENT: MEETS LOW - DOWN CODED: CPT | Performed by: NURSE PRACTITIONER

## 2019-02-25 PROCEDURE — 99309 SBSQ NF CARE MODERATE MDM 30: CPT | Performed by: NURSE PRACTITIONER

## 2019-02-25 RX ORDER — AMOXICILLIN 250 MG
1 CAPSULE ORAL DAILY
COMMUNITY
End: 2019-02-25 | Stop reason: DRUGHIGH

## 2019-02-25 RX ORDER — TIZANIDINE 2 MG/1
2 TABLET ORAL 3 TIMES DAILY PRN
COMMUNITY
End: 2019-05-10

## 2019-02-25 RX ORDER — FERROUS SULFATE 325(65) MG
325 TABLET ORAL 2 TIMES DAILY
COMMUNITY
End: 2019-04-09

## 2019-02-25 RX ORDER — FUROSEMIDE 40 MG
40 TABLET ORAL EVERY MORNING
COMMUNITY
End: 2019-04-09

## 2019-02-25 NOTE — LETTER
2/25/2019        RE: Jazmin [place]  [address]  [place] [address]        [facility]  PRIMARY CARE PROVIDER AND CLINIC:  Junie Estrella [address] [place] [address]  Chief Complaint   Patient presents with     Hospital F/U     [facility] Record Number:  [id number]  Place of Service where encounter took place:  [facility] [[id number]]    HPI:    Jazmin Clifton is a 86 year old  (12/30/1932),admitted to the above facility from  [facility].  Hospital stay February 19, 2019 through February 22, 2019  Admitted to this facility for  rehab, medical management and nursing care.  HPI information obtained from: facility chart records, facility staff, patient report and [facility] chart review.  Current issues are:        Jazmin Clifton an 86 year old female who has a history of  hypertension, COPD, congenital cystic kidney disease, obesity, CHF, dementia who woke early AM of 2/19 with SOB, lightheadedness and dizziness and was sent from her senior living to the ED. She was initially noted to have a left sided facial droop, which resolved rapidly as well as hypoxia requiring 4L of oxygen. She was noted to have significant wheezing. MRI on 2/20 negative for new ischemia. CXR showed atelectasis, no fluid overload. She was given a dose of IV lasix, steroids and nebs with improvement. She was weaned to RA. Lymphwraps for BLE edema were continued. When medically stable she was discharged back to TCU for further rehab and medical management.        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 Denies any SOB, has not been coughing, has not noted any wheezing. Slept well last night. She has been tolerating her lymphwraps and is happy with how much the swelling in her legs has improved. Reports only very mild low back pain. She is supposed to have f/u with neurosurgery today but she is unsure if a ride has been arranged and may not be able to make the apt. She does have some pain in her left arm - had x-ray of shoulder and wrist over the weekend, both of which were negative. States pain in her arm is from when a resident fell into her a about 3 weeks ago in the elevator at her facility. She had constipation while in the hospital, but had loose stools yesterday, and a normal BM today - she does not want to be on any scheduled laxatives. She states she feels ready to go back to her apartment, but dose feel that she would benefit some from therapy.     CODE STATUS/ADVANCE DIRECTIVES DISCUSSION:   DNR / DNI  Patient's living condition: lives alone    ALLERGIES:Accupril; Ace inhibitors; Augmentin; Levofloxacin; Morphine; Nitrofurantoin; Norvasc [amlodipine besylate]; and Quinapril  PAST MEDICAL HISTORY:  has a past medical history of ABDOMINAL PAIN GENERALIZED (3/15/2006), Abdominal pain, generalized (3/15/2006), Atherosclerosis of renal artery (H), BENIGN HYPERTENSION (5/1/2006), Benign neoplasm of scalp and skin of neck, Cerebral aneurysm, nonruptured, Depressive disorder, not elsewhere classified, Esophageal reflux, Female stress incontinence, Gastrointestinal malfunction arising from mental factors, Generalized osteoarthrosis, unspecified site, Herpes zoster dermatitis of eyelid, Insomnia, unspecified, Lumbago, Other chest pain, Other specified cardiac dysrhythmias(427.89), Rectocele, Unspecified disorder of kidney and ureter, " and Unspecified essential hypertension.  PAST SURGICAL HISTORY:  has a past surgical history that includes surgical history of - ; surgical history of - ; surgical history of -  (1996); surgical history of - ; surgical history of - ; cholecystectomy, laporoscopic (1997); hysterectomy, mirtha (1982); and Endarterectomy carotid (Right, 8/31/2017).  FAMILY HISTORY: family history includes Asthma in her son; Cancer in her brother and mother; Cerebrovascular Disease in her father; Diabetes in her son; Eye Disorder in her son; Gastrointestinal Disease in her son; Heart Disease in her father.  SOCIAL HISTORY:  reports that she has quit smoking. She does not have any smokeless tobacco history on file. She reports that she drinks alcohol. She reports that she does not use drugs.    Post Discharge Medication Reconciliation Status: discharge medications reconciled and changed, per note/orders (see AVS).  Current Outpatient Medications   Medication Sig Dispense Refill     acetaminophen (TYLENOL) 500 MG tablet Take 2 tablets (1,000 mg) by mouth 3 times daily 180 tablet 0     Alendronate Sodium (FOSAMAX PO) Take 70 mg by mouth once a week Take 1 tab once weekly (Thursday) on an empty stomach with full glass of water. Remain upright for 30 minutes. Wait 30 minutes before eating       Ascorbic Acid (VITAMIN C PO) Take 500 mg by mouth daily       aspirin 81 MG chewable tablet Take 81 mg by mouth       atorvastatin (LIPITOR) 40 MG tablet TAKE 1 TABLET BY MOUTH ONCE DAILY (Patient taking differently: TAKE 1 TABLET BY MOUTH at bedtime) 31 tablet 98     CALCIUM 600+D 600-200 MG-UNIT TABS TAKE 1 TAB BY MOUTH ONCE DAILY 30 tablet 11     calcium carbonate (TUMS) 500 MG chewable tablet Take 1 chew tab by mouth every hour as needed for heartburn       DONEPEZIL HCL PO Take 5 mg by mouth daily       DULoxetine HCl (CYMBALTA PO) Take 30 mg by mouth daily       ferrous sulfate (FEROSUL) 325 (65 Fe) MG tablet Take 325 mg by mouth 2 times daily        ferrous sulfate (FEROSUL) 325 (65 Fe) MG tablet Take 325 mg by mouth daily (with breakfast)       fluticasone (FLONASE) 50 MCG/ACT nasal spray Spray 1 spray into both nostrils 2 times daily       fluticasone-vilanterol (BREO ELLIPTA) 100-25 MCG/INH oral inhaler Inhale 1 puff into the lungs daily       Furosemide (LASIX PO) Take 20 mg by mouth every morning        furosemide (LASIX) 40 MG tablet Take 40 mg by mouth daily       GABAPENTIN PO Take 100 mg by mouth every morning       GABAPENTIN PO Take 200 mg by mouth At Bedtime        guaiFENesin (MUCINEX) 600 MG 12 hr tablet Take 1 tablet (600 mg) by mouth 2 times daily for 10 days 20 tablet 0     hypromellose-dextran (ARTIFICAL TEARS) 0.1-0.3 % ophthalmic solution Place 1 drop into both eyes every 6 hours as needed for dry eyes       isosorbide mononitrate (IMDUR) 30 MG 24 hr tablet TAKE 1 TABLET BY MOUTH ONCE DAILY 31 tablet 98     isradipine (DYNACIRC) 5 MG capsule TAKE 1 CAPSULE BY MOUTH TWICE DAILY 62 capsule 98     levalbuterol (XOPENEX) 1.25 MG/3ML neb solution Take 1 ampule by nebulization every 4 hours as needed for shortness of breath / dyspnea or wheezing And every 4 hours PRN       lisinopril (PRINIVIL/ZESTRIL) 10 MG tablet Take 2 tablets (20 mg) by mouth daily 30 tablet 3     MAGNESIUM OXIDE PO Take 250 mg by mouth daily       melatonin 3 MG CAPS Take 3 mg by mouth At Bedtime       memantine (NAMENDA) 5 MG tablet TAKE 1 TABLET BY MOUTH ONCE DAILY 31 tablet 98     Menthol, Topical Analgesic, (BIOFREEZE) 4 % GEL Externally apply topically 4 times daily as needed Apply topically to right hip and buttock 4 times per day. 8am, 2pm, 4pm, and 8pm       nystatin (MYCOSTATIN) 835019 UNIT/GM POWD Apply topically 2 times daily as needed        OMEPRAZOLE PO Take 40 mg by mouth every morning       oxyCODONE (ROXICODONE) 5 MG tablet Take 1 tablet (5 mg) by mouth every 4 hours as needed for moderate to severe pain 10 tablet 0     senna-docusate  "(SENOKOT-S/PERICOLACE) 8.6-50 MG tablet Take 1 tablet by mouth daily       senna-docusate (SENOKOT-S/PERICOLACE) 8.6-50 MG tablet Take 1 tablet by mouth 2 times daily as needed And 1 tab every day PRN        tiZANidine (ZANAFLEX) 2 MG tablet Take 2 mg by mouth as needed for muscle spasms       tiZANidine (ZANAFLEX) 2 MG tablet Take 1 tablet (2 mg) by mouth 3 times daily as needed for muscle spasms 270 tablet 0       ROS:  10 point ROS of systems including Constitutional, Eyes, Respiratory, Cardiovascular, Gastroenterology, Genitourinary, Integumentary, Musculoskeletal, Psychiatric were all negative except for pertinent positives noted in my HPI.    Exam:  /89   Pulse 80   Temp 97.7  F (36.5  C)   Resp 17   Ht 1.6 m (5' 3\")   Wt 89.4 kg (197 lb 3.2 oz)   SpO2 95%   BMI 34.93 kg/m     GENERAL APPEARANCE:  Alert, in no distress, oriented, morbidly obese  RESP:  respiratory effort and palpation of chest normal, lungs clear to auscultation , no respiratory distress, on RA, no cough noted  CV:  Palpation and auscultation of heart done , regular rate and rhythm, no murmur, rub, or gallop, peripheral edema 2+ in BLE  ABDOMEN:  normal bowel sounds, soft, nontender, no hepatosplenomegaly or other masses, no guarding or rebound  LYMPHATICS:  Large right groin seroma  M/S:   decreased ROM to left shoulder, mild tenderness to left bicep, small steps, decreased ROM to bilat hips, slightlt unsteady gait  SKIN:  Inspection of skin and subcutaneous tissue baseline  NEURO:   Cranial nerves 2-12 are normal tested and grossly at patient's baseline  PSYCH:  oriented X 3, normal insight, judgement and memory, affect and mood normal    Lab/Diagnostic data:     CBC RESULTS:   Recent Labs   Lab Test 02/22/19  0622 02/21/19  0711   WBC 6.5 6.4   RBC 3.81 3.53*   HGB 10.2* 9.5*   HCT 34.3* 31.8*   MCV 90 90   MCH 26.8 26.9   MCHC 29.7* 29.9*   RDW 18.7* 18.8*    223       Last Basic Metabolic Panel:  Recent Labs   Lab Test " 02/22/19  0622 02/21/19  0711    142   POTASSIUM 4.7 4.2   CHLORIDE 108 105   BLAIR 9.2 8.7   CO2 30 32   BUN 35* 34*   CR 1.09* 1.33*   * 115*       Liver Function Studies -   Recent Labs   Lab Test 02/22/19  0622 02/21/19  0711   PROTTOTAL 6.2* 5.9*   ALBUMIN 3.2* 3.1*   BILITOTAL 0.4 0.3   ALKPHOS 67 57   AST 10 14   ALT 18 14       TSH   Date Value Ref Range Status   08/14/2017 0.62 0.40 - 4.00 mU/L Final   04/18/2007 0.60 0.4 - 5.0 mU/L Final   ]    Lab Results   Component Value Date    A1C 6.2 08/31/2017       ASSESSMENT/PLAN:  (J44.1) COPD exacerbation (H)  (primary encounter diagnosis)  Comment: hypoxia, wheezing on admission. CXR showed atelectasis. CT showed no infiltrate or pleural effusion. Improved with lasix, steroids, nebs. Now on RA, no SOB noted, no coughing or wheezing noted.   Plan: Completed 5 day prednisone burst (last days today), continue Xopenex nebs PRN, flonase nasal spray BID, Breo ellipta  1 puff daily, Mucinex 600mg BID, monitor and adjust     (I50.9) Congestive heart failure, unspecified HF chronicity, unspecified heart failure type (H)  Comment: No s/s of exacerbation - no fluid overload noted on CXR, BNP WNL. Echo 2/19 showed ED of 55-60%, mild LVH, grade II diastolic dysfunction. Did improve with IV lasix. Has chronic LE edema, has been follow with lymphedema.   Plan: Continue lasix 40mg q AM, 20mg q afternoon, lisinopril 20mg daily, imdur 30mg daily, ASA 81mg daily, atorvastatin 40mg daily. Daily weights, low salt diet.     (I10) Essential hypertension, benign  Comment: Elevated during recent hospital stay, renal US done as concern r/t kidney disease; US showed benign cysts, no other abnormalities. BP in 's-170's  Plan: Continue imdur 30mg daily, lisinopril 20mg daily, isradipine 5mg daily, lasix as above. Continue to monitor and adjust.     (Z86.73) History of CVA (cerebrovascular accident)  Comment: At neurological baseline. Head CT negative.  MRI negative for  "new ischemia or old infarct. 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   Plan: Continue ASA, statin.     (N18.3) CKD (chronic kidney disease) stage 3, GFR 30-59 ml/min (H)  (Q61.9) Congenital cystic kidney disease  Comment: Baseline 1.2-1.5. Renal US showed benign cysts, otherwise negative. Creatinine stable during recent hospitalization  Plan: BMP to monitor for stability, avoid nephrotoxic medications.     (D50.9) Iron deficiency anemia, unspecified iron deficiency anemia type  Comment: Normal B12 and folate, iron low at 27, baseline Hbg ~10  Plan: Will increase iron supplement to BID. Recheck in 4-6 weeks.     (M48.062) Spinal stenosis of lumbar region with neurogenic claudication  (Z98.890) S/P lumbar laminectomy on 1//9/19  Comment: reports only very mild pain today, was changed from Norco TID and to oxycodone PRN while IP. Of note, she is on steroids, so ? If this is contributing to improved pain as she has not used narcotics recently.   Plan: Healing well, no s/s of infection. Unclear if able to go to f/u apt with neurosurgery today d/t ride. Continue APAP 1000mg TID, oxycodone 5mg q6h PRN, tizanidine 2mg TID PRN, PT/OT, monitor and adjust     (R19.09) Right groin mass - seroma  Comment: Diagnosed via US on 10/2018. Was seen be genera surgery and vascular surgery - felt to be result of lypmhnode involvement in previous surgery to remove groin mass. She was recommended to wear Spanks compression; did not feel draining would be beneficial as it is likely to re-ocurr. Stable, she feels it is better. Has been non-compliant with spanks/compression  Plan: Compression as tolerated    (M79.602) Pain of left upper extremity  Comment: " X-ray's negative, likely soft tissue injury from other resident falling into her on the elevator at her facility   Plan: Continue pain regimen as above, PT/OT    (R53.81) Physical deconditioning  Comment:   Plan: PT/OT eval and treat. Discharge planning per their recommendation    Orders:  1. Change Fosamax to 70 mg PO q 7 days. Dx: Osteoporosis.  2. Increase iron supplement to 325 mg PO BID. Dx: Anemia  3. Discontinue scheduled senna - s  Per pt request.  4. Increase Senna-S PRN to 1 tab PO BID PRN. Dx: Constipation  5. BMP, CBC on 2/27/19. Dx: CKD, anemia    Total time spent with patient visit at the skilled nursing facility was 35 min including patient visit and review of past records. Greater than 50% of total time spent with counseling and coordinating care due to discussion of plan of care and patient education d/t CHF, COPD, seroma, recent back surgery, CKD    Electronically signed by:  MARY Gómez CNP                    Sincerely,        MARY Gómez CNP

## 2019-02-26 ENCOUNTER — ASSISTED LIVING VISIT (OUTPATIENT)
Dept: GERIATRICS | Facility: CLINIC | Age: 84
End: 2019-02-26
Payer: COMMERCIAL

## 2019-02-26 VITALS
RESPIRATION RATE: 17 BRPM | HEIGHT: 63 IN | OXYGEN SATURATION: 94 % | DIASTOLIC BLOOD PRESSURE: 91 MMHG | TEMPERATURE: 97.3 F | SYSTOLIC BLOOD PRESSURE: 145 MMHG | WEIGHT: 194 LBS | HEART RATE: 59 BPM | BODY MASS INDEX: 34.38 KG/M2

## 2019-02-26 DIAGNOSIS — M48.061 SPINAL STENOSIS OF LUMBAR REGION, UNSPECIFIED WHETHER NEUROGENIC CLAUDICATION PRESENT: ICD-10-CM

## 2019-02-26 DIAGNOSIS — K21.9 GASTROESOPHAGEAL REFLUX DISEASE WITHOUT ESOPHAGITIS: ICD-10-CM

## 2019-02-26 DIAGNOSIS — E78.5 HYPERLIPIDEMIA, UNSPECIFIED HYPERLIPIDEMIA TYPE: ICD-10-CM

## 2019-02-26 DIAGNOSIS — K59.01 SLOW TRANSIT CONSTIPATION: ICD-10-CM

## 2019-02-26 DIAGNOSIS — I10 ESSENTIAL HYPERTENSION: ICD-10-CM

## 2019-02-26 DIAGNOSIS — I25.10 CORONARY ARTERY DISEASE INVOLVING NATIVE CORONARY ARTERY OF NATIVE HEART WITHOUT ANGINA PECTORIS: ICD-10-CM

## 2019-02-26 DIAGNOSIS — R60.0 BILATERAL LEG EDEMA: ICD-10-CM

## 2019-02-26 DIAGNOSIS — J44.9 CHRONIC OBSTRUCTIVE PULMONARY DISEASE, UNSPECIFIED COPD TYPE (H): Primary | Chronic | ICD-10-CM

## 2019-02-26 DIAGNOSIS — F03.90 DEMENTIA WITHOUT BEHAVIORAL DISTURBANCE, UNSPECIFIED DEMENTIA TYPE: ICD-10-CM

## 2019-02-26 DIAGNOSIS — F41.8 DEPRESSION WITH ANXIETY: ICD-10-CM

## 2019-02-26 DIAGNOSIS — R53.81 PHYSICAL DECONDITIONING: ICD-10-CM

## 2019-02-26 DIAGNOSIS — Z86.73 HISTORY OF CVA (CEREBROVASCULAR ACCIDENT): ICD-10-CM

## 2019-02-26 PROCEDURE — 99305 1ST NF CARE MODERATE MDM 35: CPT | Performed by: INTERNAL MEDICINE

## 2019-02-26 ASSESSMENT — MIFFLIN-ST. JEOR: SCORE: 1289.11

## 2019-02-26 ASSESSMENT — PAIN SCALES - GENERAL: PAINLEVEL: NO PAIN (0)

## 2019-02-27 ENCOUNTER — HOSPITAL LABORATORY (OUTPATIENT)
Facility: OTHER | Age: 84
End: 2019-02-27

## 2019-02-27 ENCOUNTER — PATIENT OUTREACH (OUTPATIENT)
Dept: GERIATRIC MEDICINE | Facility: CLINIC | Age: 84
End: 2019-02-27

## 2019-02-27 LAB
ANION GAP SERPL CALCULATED.3IONS-SCNC: 2 MMOL/L (ref 3–14)
BUN SERPL-MCNC: 42 MG/DL (ref 7–30)
CALCIUM SERPL-MCNC: 9 MG/DL (ref 8.5–10.1)
CHLORIDE SERPL-SCNC: 107 MMOL/L (ref 94–109)
CO2 SERPL-SCNC: 32 MMOL/L (ref 20–32)
CREAT SERPL-MCNC: 1.16 MG/DL (ref 0.52–1.04)
ERYTHROCYTE [DISTWIDTH] IN BLOOD BY AUTOMATED COUNT: 18.3 % (ref 10–15)
GFR SERPL CREATININE-BSD FRML MDRD: 43 ML/MIN/{1.73_M2}
GLUCOSE SERPL-MCNC: 114 MG/DL (ref 70–99)
HCT VFR BLD AUTO: 38.8 % (ref 35–47)
HGB BLD-MCNC: 11.5 G/DL (ref 11.7–15.7)
MCH RBC QN AUTO: 26.9 PG (ref 26.5–33)
MCHC RBC AUTO-ENTMCNC: 29.6 G/DL (ref 31.5–36.5)
MCV RBC AUTO: 91 FL (ref 78–100)
PLATELET # BLD AUTO: 249 10E9/L (ref 150–450)
POTASSIUM SERPL-SCNC: 3.9 MMOL/L (ref 3.4–5.3)
RBC # BLD AUTO: 4.27 10E12/L (ref 3.8–5.2)
SODIUM SERPL-SCNC: 141 MMOL/L (ref 133–144)
WBC # BLD AUTO: 9.2 10E9/L (ref 4–11)

## 2019-02-27 NOTE — PROGRESS NOTES
2-27-19   CC received notice from SW at [facility] that member is doing well and that it is the plan she will be going back to her [place] apartment next week 3-5.  She will update CC if there are any other concerns or question for member.  There will be no discharge meeting at this time.      CC then reached out to member dale Mireles to introduce herself and see if he had any questions or concerns.  Chi said member was doing well at this time and planning to go back to [place].  CC said that she would like to go and visit with member there when she is discharged back home.      CC asked if he would like to be present at visit and he stated he did not need to be there.  CC will then confirm that member is back to her apartment next week and then make arrangement to see her there.     Transition HRA will be completed now and updated as needed.  Mirian Weldon MA [facility] Care Coordinator   [phone]

## 2019-02-27 NOTE — PROGRESS NOTES
[facility]  INITIAL VISIT NOTE  February 27, 2019    PRIMARY CARE PROVIDER AND CLINIC:  Junie Estrella [address] [id number] / [facility] [id number]    Chief Complaint   Patient presents with     Hospital F/U       HPI:    Jazmin Clifton is a 86 year old  (12/30/1932) female who [facility] for an initial visit. Medical history is notable for CAD, HTN, COPD, CVA and lumbar spinal stenosis s/p L1-L2 bilateral laminectomy and medial facetectomy (1/9/19). She was hospitalized at [facility] from 2/19/19 to 2/22/19 where she presented with lightheadedness and weakness. and was treated for a COPD exacerbation with a prednisone burst. She was admitted to this facility for medical management and rehab.     Today, Ms. Clifton is seen in her room after breakfast. Her main concern is a painful inner aspect of her left eye. She kept sticking her finger in her eye, but became apparent she has a small stype and we discussed heat to treat it. No dyspnea. No chest pain. No abdominal pain. Intermittent diarrhea. Working with therapies. No concerns per nursing. Goal is to return to her AL apartment as soon as possible.     CODE STATUS:   DNR / DNI    ALLERGIES:     Allergies   Allergen Reactions     Accupril      Ace Inhibitors Unknown     Accupril     Augmentin Unknown     Levofloxacin Unknown, Muscle Pain (Myalgia) and Other (See Comments)     Comment: myalgias, Description:   Pt prescribed ciprofloxacin in Jan 2018 (no rxn documented)  Myalgias       Morphine Other (See Comments)     hallucinations     Nitrofurantoin Nausea and Vomiting and Unknown     Norvasc [Amlodipine Besylate]      Leg swelling       Quinapril Other (See Comments) and Unknown     Hypertension. 3.29.18 - Takes and tolerates lisinopril  Patient reports HTN with as reaction to this medication         PAST MEDICAL HISTORY:   Past Medical History:   Diagnosis Date     ABDOMINAL PAIN  "GENERALIZED 3/15/2006    1 month of abdominal pain that llqwhich radiates into her back and chest.  Pt was hospitilzed 2x for the pain at [facility] --pt hopsitized for 3 days.  Rcords not ab=vailable.  She was told either \" twisted intestine or blockage of bowel.  Pt feels it is the hiatal hernia.  Pt hospitilized again a second time  A month ago.  Ct scans x 2 showed \" nothing.\"  Pt had a barium and xrays.  Pt sa     Abdominal pain, generalized 3/15/2006    1 month of abdominal pain that llqwhich radiates into her back and chest.  Pt was hospitilzed 2x for the pain at [facility] --pt hopsitized for 3 days.  Rcords not ab=vailable.  She was told either \" twisted intestine or blockage of bowel.  Pt feels it is the hiatal hernia.  Pt hospitilized again a second time  A month ago.  Ct scans x 2 showed \" nothing.\"  Pt had a barium and xrays.  Pt sa     Atherosclerosis of renal artery (H)     Left renal artery stenosis     BENIGN HYPERTENSION 5/1/2006 5/1/2006   Increase catapres to 0.3 mg and decrease clonidine to 0.1 mg daily.  Recheck bp in 1 month.      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     Dyspepsia     Generalized osteoarthrosis, unspecified site     DJD-chronic back pain     Herpes zoster dermatitis of eyelid      Insomnia, unspecified      Lumbago     Chronic back pain     Other chest pain     Atypical Chest Pain     Other specified cardiac dysrhythmias(427.89)     Bradycardia, improved on lower dose beta blockers      Rectocele     Grade 3     Unspecified disorder of kidney and ureter     Renal insufficiency     Unspecified essential hypertension        PAST SURGICAL HISTORY:   Past Surgical " History:   Procedure Laterality Date     CHOLECYSTECTOMY, LAPOROSCOPIC  1997    Cholecystectomy, Laparoscopic     ENDARTERECTOMY CAROTID Right 8/31/2017    Procedure: ENDARTERECTOMY CAROTID;  RIGHT CAROTID ENDARTERECTOMY WITH EEG;  Surgeon: Hilario Parry MD;  Location: [facility] OR     HYSTERECTOMY, RAYMOND  1982    oophorectomy,RAYMOND     SURGICAL HISTORY OF -       Laminectomy x 3     SURGICAL HISTORY OF -       MCA Aneurysm repair     SURGICAL HISTORY OF -   1996    Bladder suspension (Bladder Repair x2)     SURGICAL HISTORY OF -       Bilateral Hand Surgeries for Arthritis x2     SURGICAL HISTORY OF -       Repair of a Cerebral Aneurysm       FAMILY HISTORY:   Family History   Problem Relation Age of Onset     Cancer Mother         stomache     Cerebrovascular Disease Father      Heart Disease Father      Cancer Brother         lymphoma     Eye Disorder Son      Asthma Son      Diabetes Son      Gastrointestinal Disease Son         no control over bladder or bowels     C.A.D. No family hx of      Hypertension No family hx of      Breast Cancer No family hx of      Cancer - colorectal No family hx of      Prostate Cancer No family hx of        SOCIAL HISTORY:   Lives in [facility]     MEDICATIONS:  Current Outpatient Medications   Medication Sig Dispense Refill     acetaminophen (TYLENOL) 500 MG tablet Take 2 tablets (1,000 mg) by mouth 3 times daily 180 tablet 0     Alendronate Sodium (FOSAMAX PO) Take 70 mg by mouth once a week Take 1 tab once weekly (Thursday) on an empty stomach with full glass of water. Remain upright for 30 minutes. Wait 30 minutes before eating       Ascorbic Acid (VITAMIN C PO) Take 500 mg by mouth daily       aspirin 81 MG chewable tablet Take 81 mg by mouth       atorvastatin (LIPITOR) 40 MG tablet TAKE 1 TABLET BY MOUTH ONCE DAILY (Patient taking differently: TAKE 1 TABLET BY MOUTH at bedtime) 31 tablet 98     CALCIUM 600+D 600-200 MG-UNIT TABS TAKE 1 TAB BY MOUTH ONCE DAILY 30 tablet 11      calcium carbonate (TUMS) 500 MG chewable tablet Take 1 chew tab by mouth every hour as needed for heartburn       DONEPEZIL HCL PO Take 5 mg by mouth daily       DULoxetine HCl (CYMBALTA PO) Take 30 mg by mouth daily       ferrous sulfate (FEROSUL) 325 (65 Fe) MG tablet Take 325 mg by mouth 2 times daily       fluticasone (FLONASE) 50 MCG/ACT nasal spray Spray 1 spray into both nostrils 2 times daily       fluticasone-vilanterol (BREO ELLIPTA) 100-25 MCG/INH oral inhaler Inhale 1 puff into the lungs daily       Furosemide (LASIX PO) Take 20 mg by mouth daily In the afternoon       furosemide (LASIX) 40 MG tablet Take 40 mg by mouth every morning        GABAPENTIN PO Take 100 mg by mouth every morning       GABAPENTIN PO Take 200 mg by mouth At Bedtime        guaiFENesin (MUCINEX) 600 MG 12 hr tablet Take 1 tablet (600 mg) by mouth 2 times daily for 10 days 20 tablet 0     hypromellose-dextran (ARTIFICAL TEARS) 0.1-0.3 % ophthalmic solution Place 1 drop into both eyes every 6 hours as needed for dry eyes       isosorbide mononitrate (IMDUR) 30 MG 24 hr tablet TAKE 1 TABLET BY MOUTH ONCE DAILY 31 tablet 98     isradipine (DYNACIRC) 5 MG capsule TAKE 1 CAPSULE BY MOUTH TWICE DAILY 62 capsule 98     levalbuterol (XOPENEX) 1.25 MG/3ML neb solution Take 1 ampule by nebulization every 4 hours as needed for shortness of breath / dyspnea or wheezing And every 4 hours PRN       lisinopril (PRINIVIL/ZESTRIL) 10 MG tablet Take 2 tablets (20 mg) by mouth daily 30 tablet 3     MAGNESIUM OXIDE PO Take 250 mg by mouth daily       melatonin 3 MG CAPS Take 3 mg by mouth At Bedtime       memantine (NAMENDA) 5 MG tablet TAKE 1 TABLET BY MOUTH ONCE DAILY 31 tablet 98     Menthol, Topical Analgesic, (BIOFREEZE) 4 % GEL Externally apply topically 4 times daily as needed Apply topically to right hip and buttock 4 times per day. 8am, 2pm, 4pm, and 8pm       nystatin (MYCOSTATIN) 559969 UNIT/GM POWD Apply topically 2 times daily as needed     "    OMEPRAZOLE PO Take 40 mg by mouth every morning       oxyCODONE (ROXICODONE) 5 MG tablet Take 1 tablet (5 mg) by mouth every 4 hours as needed for moderate to severe pain 10 tablet 0     senna-docusate (SENOKOT-S/PERICOLACE) 8.6-50 MG tablet Take 1 tablet by mouth 2 times daily as needed        tiZANidine (ZANAFLEX) 2 MG tablet Take 2 mg by mouth 3 times daily as needed for muscle spasms            ROS:  10 point ROS neg other than the symptoms noted above in the HPI.    PHYSICAL EXAM:  BP (!) 145/91   Pulse 59   Temp 97.3  F (36.3  C)   Resp 17   Ht 1.6 m (5' 3\")   Wt 88 kg (194 lb)   SpO2 94%   BMI 34.37 kg/m     Gen: sitting in wheelchair, alert, cooperative and in no acute distress  HEENT: normocephalic; small style just below rim of L medial lowe eyelid without signs of infection  Card: RRR, S1, S2, no murmurs  Resp: lungs clear to auscultation bilaterally, no crackles or wheezes  GI: abdomen soft, not-tender  MSK: normal muscle tone, some dependent LE edema  Neuro: CX II-XII grossly in tact; ROM in all four extremities grossly in tact  Psych: alert and oriented to self and general situation; normal affect    LABORATORY/IMAGING DATA:  Reviewed as per Epic    ASSESSMENT/PLAN:    COPD Exacerbation, Resolved  Afebrile. No hypoxia. Has completed prednisone burst. Lungs are clear.   -- continues on fluticasone-vilanterol 100-25 mcg daily, umeclidinium-vilanterol and levalbuterol nebs     CAD / CHF / HTN / HLD  SBPs labile in 150s  -- continues on ASA 81 mg daily, atorvastatin 40 mg qhs, furosemide 40 mg daily in the morning 20 mg in the afternoon, Imdur 30 mg daily, isradipine 5 mg BID and lisinopril 20 mg daily   -- follow BPs, weights, clinical volume status     Bilateral LE Edema  Chronic.   -- continues on ffurosemide 40 mg daily in the morning 20 mg in the afternoon  -- compression and elevation      Dementia Without Behavioral Disturbance  Lives in AL facility. Today was a good historian. CPT " 4.7/5.6 during June TCU stay.   -- OT following for cog assessment  -- continues on donepezil 5 mg daily and memantine 5 mg daily      Depression / Anxiety  Mood and spirits good today  -- continues on duloxetine 30 mg daily  -- supportive cares     CVA  By history.   -- continues on ASA 81 mg daily and atorvastatin 40 mg qhs     Lumbar Spinal Stenosis s/p L1 L2 Bilateral Laminectomy and Medial Facetectomy (1/9/19)  Surgery without complication.   -- analgesia with APAP 1000 mg TID, duloxetine 30 mg daily, gabapentin 100 mg qam and 200 mg at bedtime, oxycodone 5 mg q4h PRN and tizanidine 2 mg TID PRN    Osteoporosis  -- continues on alendronate 70 mg weekly on Thursdays and Ca+D     GERD  -- continues on omeprazole 40 mg daily     Slow Transit Constipation  -- continues on Senna-S 1 tab BID PRN  -- adjust bowel regimen as needed     Physical Deconditioning  In setting of hospitalization and underlying medical conditions  -- ongoing PT/OT        Electronically signed by:  Ana Cristina Yu MD

## 2019-03-01 ENCOUNTER — PATIENT OUTREACH (OUTPATIENT)
Dept: GERIATRIC MEDICINE | Facility: CLINIC | Age: 84
End: 2019-03-01

## 2019-03-01 ENCOUNTER — COMMUNICATION - HEALTHEAST (OUTPATIENT)
Dept: RESPIRATORY THERAPY | Facility: HOSPITAL | Age: 84
End: 2019-03-01

## 2019-03-03 NOTE — PROGRESS NOTES
[place] [facility]ERIAT[facility]IC [facility] DISCHARGE SUMMARY    PATIENT'S NAME: Jazmin Clifton  YOB: 1932  MEDICAL RECORD NUMBER:  [id number]  Place of Service where encounter took place:  [facility] ([facility]) [[id number]]    PRIMARY CARE PROVIDER AND CLINIC RESPONSIBLE AFTER TRANSFER: Junie Estrella [address] [address] / [place] [address]     TRANSFERRING PROVIDERS: MARY Gómez CNP, Dr. Gigi MD  DATE OF SNF ADMISSION:  February / 25 / 2019  DATE OF SNF (anticipated) DISCHARGE: March / 06 / 2019  DISCHARGE DISPOSITION: [facility] Provider   RECENT [facility]OSPITALIZ[facility].     CODE STATUS/ADVANCE DIRECTIVES DISCUSSION:   DNR / DNI     Allergies   Allergen Reactions     Accupril      Ace Inhibitors Unknown     Accupril     Augmentin Unknown     Levofloxacin Unknown, Muscle Pain (Myalgia) and Other (See Comments)     Comment: myalgias, Description:   Pt prescribed ciprofloxacin in Jan 2018 (no rxn documented)  Myalgias       Morphine Other (See Comments)     hallucinations     Nitrofurantoin Nausea and Vomiting and Unknown     Norvasc [Amlodipine Besylate]      Leg swelling       Quinapril Other (See Comments) and Unknown     Hypertension. 3.29.18 - Takes and tolerates lisinopril  Patient reports HTN with as reaction to this medication       Condition on Discharge:  Improving.  Function:  SBA with walker in vizcarra on unit independent in room, LMT A1 with applying Jobst stockings  Cognitive Scores: SLUMS 27/30    Equipment: walker and wheelchair    DISCHARGE DIAGNOSIS:   1. Chronic obstructive pulmonary disease, unspecified COPD type (H)    2. Coronary artery disease involving native coronary artery of native heart without angina pectoris    3. Essential hypertension    4. Hyperlipidemia, unspecified hyperlipidemia type    5. Chronic congestive heart failure, unspecified heart failure type (H)    6. Bilateral leg edema    7. Dementia without  behavioral disturbance, unspecified dementia type    8. Depression with anxiety    9. History of CVA (cerebrovascular accident)    10. Spinal stenosis of lumbar region, unspecified whether neurogenic claudication present    11. S/P lumbar laminectomy    12. Gastroesophageal reflux disease without esophagitis    13. Slow transit constipation    14. CKD (chronic kidney disease) stage 3, GFR 30-59 ml/min (H)    15. Iron deficiency anemia, unspecified iron deficiency anemia type    16. Congenital cystic kidney disease    17. Right groin mass - seroma        HPI Nursing Facility Course:  HPI information obtained from: facility chart records, facility staff, patient report and [facility] chart review.    Jazmin Clifton an 86 year old female who has a history of  hypertension, COPD, congenital cystic kidney disease, obesity, CHF, dementia who woke early AM of 2/19 with SOB, lightheadedness and dizziness and was sent from her TERRA to the ED. She was initially noted to have a left sided facial droop, which resolved rapidly as well as hypoxia requiring 4L of oxygen. She was noted to have significant wheezing. MRI on 2/20 negative for new ischemia. CXR showed atelectasis, no fluid overload. She was given a dose of IV lasix, steroids and nebs with improvement. She was weaned to RA. Lymphwraps for BLE edema were continued. When medically stable she was discharged back to TCU for further rehab and medical management.     Chronic obstructive pulmonary disease, unspecified COPD type (H)  Presented with hypoxia, wheezing. CXR showed atelectasis. CT showed no infiltrate or pleural effusion. Improved with lasix, steroids, nebs. She completed 5 day prednisone burst (2/25/19), continues on Xopenex nebs PRN, flonase nasal spray BID, Breo ellipta  1 puff daily, Mucinex 600mg BID x 10 days (last dose 3/4). Remains on RA with no respiratory concerns in TCU.      Coronary artery disease involving native coronary artery of native heart  "without angina pectoria  Essential hypertension  Hyperlipidemia, unspecified hyperlipidemia type  Noted to have intermittently elevated BP, but suspect pain contributing, thought renal disease could be contributing. Renal US showed cysts, no other abnormalities. Continue lasix 40mg q AM, 20mg q afternoon, lisinopril 20mg daily, imdur 30mg daily, ASA 81mg daily, atorvastatin 40mg daily, isradipine 5mg daily.    Chronic congestive heart failure, unspecified heart failure type (H)  Bilateral leg edema  No s/s of exacerbation - no fluid overload noted on CXR, BNP WNL. Echo 2/19 showed ED of 55-60%, mild LVH, grade II diastolic dysfunction. Respiratory status did improve with IV lasix. Has chronic LE edema, was treated with lymph wraps with improvement. She is discharged on PTA lasix 40gm qAM and 20mg q afternoon, lisinopril 20mg daily. She will continue with daily weights, low salt diet, Jobst stockings.     Dementia without behavioral disturbance, unspecified dementia type  SLUMS 27/30. She is on donepezil, memantine.     Depression with anxiety  Continue duloxetine 30mg daily     History of CVA (cerebrovascular accident)  At neurological baseline. Head CT negative.  MRI negative for new ischemia or old infarct. 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 Remains on statin, ASA.     Spinal stenosis of lumbar region, unspecified whether neurogenic claudication present  S/P lumbar laminectomy 1/19/19 with Dr. Morris.  Was changed from Norco TID and to oxycodone PRN while IP. Will add oxycodone BID on TCU discharge as has been using at least twice daily in TCU. Continue APAP 1000mg TID, oxycodone 5mg q6h PRN, tizanidine 2mg TID PRN, PT/OT, " monitor and adjust. Will need to reschedule f/u apt with Dr Morris as she missed d/t hospital stay/TCU admission    Gastroesophageal reflux disease without esophagitis  Continue omeprazole 40mg q AM    Slow transit constipation  Having regular bowel movements, she is on senna-s PRN.     CKD (chronic kidney disease) stage 3, GFR 30-59 ml/min (H)  Congenital cystic kidney disease  Baseline 1.2-1.5. Renal US showed benign cysts, otherwise negative. Creatinine stable     Iron deficiency anemia, unspecified iron deficiency anemia type  Normal B12 and folate, iron low at 27, baseline Hbg ~10. Iron supplement increased to BID. [place] recheck hgb/iron levels in 4-6 weeks.     Right groin mass - seroma  Diagnosed via US on 10/2018. Was seen be general surgery and vascular surgery - felt to be result of lypmhnode involvement in previous surgery to remove groin mass. She was recommended to wear Spanks compression; did not feel draining would be beneficial as it is likely to re-ocurr. Has not been compliant with spanks as she feels they are too constricting. Has ordered alternative garments for compression and reports she plans to wear them regularly.       PAST MEDICAL HISTORY:  has a past medical history of ABDOMINAL PAIN GENERALIZED (3/15/2006), Abdominal pain, generalized (3/15/2006), Atherosclerosis of renal artery (H), BENIGN HYPERTENSION (5/1/2006), Benign neoplasm of scalp and skin of neck, Cerebral aneurysm, nonruptured, Depressive disorder, not elsewhere classified, Esophageal reflux, Female stress incontinence, Gastrointestinal malfunction arising from mental factors, Generalized osteoarthrosis, unspecified site, Herpes zoster dermatitis of eyelid, Insomnia, unspecified, Lumbago, Other chest pain, Other specified cardiac dysrhythmias(427.89), Rectocele, Unspecified disorder of kidney and ureter, and Unspecified essential hypertension.    DISCHARGE MEDICATIONS:  Current Outpatient Medications   Medication Sig Dispense  Refill     acetaminophen (TYLENOL) 500 MG tablet Take 2 tablets (1,000 mg) by mouth 3 times daily 180 tablet 0     Alendronate Sodium (FOSAMAX PO) Take 70 mg by mouth once a week Take 1 tab once weekly (Thursday) on an empty stomach with full glass of water. Remain upright for 30 minutes. Wait 30 minutes before eating       Ascorbic Acid (VITAMIN C PO) Take 500 mg by mouth daily       aspirin 81 MG chewable tablet Take 81 mg by mouth       atorvastatin (LIPITOR) 40 MG tablet TAKE 1 TABLET BY MOUTH ONCE DAILY (Patient taking differently: TAKE 1 TABLET BY MOUTH at bedtime) 31 tablet 98     CALCIUM 600+D 600-200 MG-UNIT TABS TAKE 1 TAB BY MOUTH ONCE DAILY 30 tablet 11     calcium carbonate (TUMS) 500 MG chewable tablet Take 1 chew tab by mouth every hour as needed for heartburn       DONEPEZIL HCL PO Take 5 mg by mouth daily       DULoxetine HCl (CYMBALTA PO) Take 30 mg by mouth daily       ferrous sulfate (FEROSUL) 325 (65 Fe) MG tablet Take 325 mg by mouth 2 times daily       fluticasone (FLONASE) 50 MCG/ACT nasal spray Spray 1 spray into both nostrils 2 times daily       fluticasone-vilanterol (BREO ELLIPTA) 100-25 MCG/INH oral inhaler Inhale 1 puff into the lungs daily       Furosemide (LASIX PO) Take 20 mg by mouth daily In the afternoon       furosemide (LASIX) 40 MG tablet Take 40 mg by mouth every morning        GABAPENTIN PO Take 100 mg by mouth every morning       GABAPENTIN PO Take 200 mg by mouth At Bedtime        hypromellose-dextran (ARTIFICAL TEARS) 0.1-0.3 % ophthalmic solution Place 1 drop into both eyes every 6 hours as needed for dry eyes       isosorbide mononitrate (IMDUR) 30 MG 24 hr tablet TAKE 1 TABLET BY MOUTH ONCE DAILY 31 tablet 98     isradipine (DYNACIRC) 5 MG capsule TAKE 1 CAPSULE BY MOUTH TWICE DAILY 62 capsule 98     levalbuterol (XOPENEX) 1.25 MG/3ML neb solution Take 1 ampule by nebulization every 4 hours as needed for shortness of breath / dyspnea or wheezing And every 4 hours PRN    "    lisinopril (PRINIVIL/ZESTRIL) 10 MG tablet Take 2 tablets (20 mg) by mouth daily 30 tablet 3     MAGNESIUM OXIDE PO Take 250 mg by mouth daily       melatonin 3 MG CAPS Take 3 mg by mouth At Bedtime       memantine (NAMENDA) 5 MG tablet TAKE 1 TABLET BY MOUTH ONCE DAILY 31 tablet 98     Menthol, Topical Analgesic, (BIOFREEZE) 4 % GEL Externally apply topically 4 times daily as needed Apply topically to right hip and buttock 4 times per day. 8am, 2pm, 4pm, and 8pm       nystatin (MYCOSTATIN) 733862 UNIT/GM POWD Apply topically 2 times daily as needed        OMEPRAZOLE PO Take 40 mg by mouth every morning       oxyCODONE HCl (OXAYDO) 5 MG TABA Take 5 mg by mouth every 4 hours as needed       senna-docusate (SENOKOT-S/PERICOLACE) 8.6-50 MG tablet Take 1 tablet by mouth 2 times daily as needed        tiZANidine (ZANAFLEX) 2 MG tablet Take 2 mg by mouth 3 times daily as needed for muscle spasms        guaiFENesin (MUCINEX) 600 MG 12 hr tablet Take 1 tablet (600 mg) by mouth 2 times daily for 10 days 20 tablet 0       MEDICATION CHANGES/RATIONALE:   Increase iron supplement to BID  Change senna-s to PRN    Controlled medications sent with patient:   Medication: 29 , oxycodone tabs given to patient at the time of discharge to take home  Script for oxycodone  medication for 240  tabs and 0 refills given to patient at dischage to have them fill at their out patient pharmacy(electronic RX)     ROS:    10 point ROS of systems including Constitutional, Eyes, Respiratory, Cardiovascular, Gastroenterology, Genitourinary, Integumentary, Musculoskeletal, Psychiatric were all negative except for pertinent positives noted in my HPI.    Physical Exam:   Vitals: /77   Pulse 69   Temp 97.8  F (36.6  C)   Resp 19   Ht 1.6 m (5' 3\")   Wt 88 kg (194 lb)   SpO2 97%   BMI 34.37 kg/m    BMI= Body mass index is 34.37 kg/m .    GENERAL APPEARANCE:  Alert, in no distress  ENT:  Mouth and posterior oropharynx normal, moist mucous " membranes, hearing acuity mildly [place]  RESP:  respiratory effort and palpation of chest normal, no respiratory distress, Lung sounds clear t/o, on RA, no cough noted  CV:  Palpation and auscultation of heart done , rate and rhythm regular, no murmur, no rub or gallop, Edema 1-2 BLE, compression stockings in place, 2+ pedal pulses  ABDOMEN:  normal bowel sounds, soft, nontender, no hepatosplenomegaly or other masses  M/S:   Gait and station small, slow steps, slightly unsteady gait, PETERSEN purposefully, no gross joint deformities  SKIN:  Inspection/Palpation of skin and subcutaneous tissue healed surgical incision to lumbar spine, palpable fluid filled mass to right groin, nontender  NEURO: 2-12 in normal limits and at patient's baseline  PSYCH:  insight and judgement, memory fair , affect and mood normal    DISCHARGE PLAN:  [facility] with outpatient Physical Therapy  Patient instructed to follow-up with:  PCP in 7 days      Current [place] scheduled appointments:  Future Appointments   Date Time Provider Department Center   3/4/2019 12:00 PM Junie Estrella, MARY CNP [facility] referral needed and placed by this provider: No    Pending labs: None  SNF labs   CBC RESULTS:   Recent Labs   Lab Test 02/27/19  0753 02/22/19  0622   WBC 9.2 6.5   RBC 4.27 3.81   HGB 11.5* 10.2*   HCT 38.8 34.3*   MCV 91 90   MCH 26.9 26.8   MCHC 29.6* 29.7*   RDW 18.3* 18.7*    240       Last Basic Metabolic Panel:  Recent Labs   Lab Test 02/27/19  0753 02/22/19  0622    143   POTASSIUM 3.9 4.7   CHLORIDE 107 108   BLAIR 9.0 9.2   CO2 32 30   BUN 42* 35*   CR 1.16* 1.09*   * 112*       Liver Function Studies -   Recent Labs   Lab Test 02/22/19  0622 02/21/19  0711   PROTTOTAL 6.2* 5.9*   ALBUMIN 3.2* 3.1*   BILITOTAL 0.4 0.3   ALKPHOS 67 57   AST 10 14   ALT 18 14       TSH   Date Value Ref Range Status   08/14/2017 0.62 0.40 - 4.00 mU/L Final   04/18/2007 0.60 0.4 - 5.0  mU/L Final   ]    Lab Results   Component Value Date    A1C 6.2 08/31/2017         [facility]  Daily weights  Medications as above      TOTAL DISCHARGE TIME:   Greater than 30 minutes  Electronically signed by:  MARY Gómez CNP

## 2019-03-04 ENCOUNTER — DISCHARGE SUMMARY NURSING HOME (OUTPATIENT)
Dept: GERIATRICS | Facility: CLINIC | Age: 84
End: 2019-03-04
Payer: COMMERCIAL

## 2019-03-04 VITALS
OXYGEN SATURATION: 97 % | HEIGHT: 63 IN | HEART RATE: 69 BPM | WEIGHT: 194 LBS | TEMPERATURE: 97.8 F | DIASTOLIC BLOOD PRESSURE: 77 MMHG | BODY MASS INDEX: 34.38 KG/M2 | RESPIRATION RATE: 19 BRPM | SYSTOLIC BLOOD PRESSURE: 155 MMHG

## 2019-03-04 DIAGNOSIS — R19.09 RIGHT GROIN MASS: ICD-10-CM

## 2019-03-04 DIAGNOSIS — D50.9 IRON DEFICIENCY ANEMIA, UNSPECIFIED IRON DEFICIENCY ANEMIA TYPE: ICD-10-CM

## 2019-03-04 DIAGNOSIS — I25.10 CORONARY ARTERY DISEASE INVOLVING NATIVE CORONARY ARTERY OF NATIVE HEART WITHOUT ANGINA PECTORIS: ICD-10-CM

## 2019-03-04 DIAGNOSIS — E78.5 HYPERLIPIDEMIA, UNSPECIFIED HYPERLIPIDEMIA TYPE: ICD-10-CM

## 2019-03-04 DIAGNOSIS — N18.30 CKD (CHRONIC KIDNEY DISEASE) STAGE 3, GFR 30-59 ML/MIN (H): ICD-10-CM

## 2019-03-04 DIAGNOSIS — Z98.890 S/P LUMBAR LAMINECTOMY: ICD-10-CM

## 2019-03-04 DIAGNOSIS — I50.9 CHRONIC CONGESTIVE HEART FAILURE, UNSPECIFIED HEART FAILURE TYPE (H): ICD-10-CM

## 2019-03-04 DIAGNOSIS — K59.01 SLOW TRANSIT CONSTIPATION: ICD-10-CM

## 2019-03-04 DIAGNOSIS — J44.9 CHRONIC OBSTRUCTIVE PULMONARY DISEASE, UNSPECIFIED COPD TYPE (H): Primary | ICD-10-CM

## 2019-03-04 DIAGNOSIS — R60.0 BILATERAL LEG EDEMA: ICD-10-CM

## 2019-03-04 DIAGNOSIS — K21.9 GASTROESOPHAGEAL REFLUX DISEASE WITHOUT ESOPHAGITIS: ICD-10-CM

## 2019-03-04 DIAGNOSIS — I10 ESSENTIAL HYPERTENSION: ICD-10-CM

## 2019-03-04 DIAGNOSIS — Z86.73 HISTORY OF CVA (CEREBROVASCULAR ACCIDENT): ICD-10-CM

## 2019-03-04 DIAGNOSIS — F41.8 DEPRESSION WITH ANXIETY: ICD-10-CM

## 2019-03-04 DIAGNOSIS — M48.061 SPINAL STENOSIS OF LUMBAR REGION, UNSPECIFIED WHETHER NEUROGENIC CLAUDICATION PRESENT: ICD-10-CM

## 2019-03-04 DIAGNOSIS — F03.90 DEMENTIA WITHOUT BEHAVIORAL DISTURBANCE, UNSPECIFIED DEMENTIA TYPE: ICD-10-CM

## 2019-03-04 DIAGNOSIS — Q61.9 CONGENITAL CYSTIC KIDNEY DISEASE: ICD-10-CM

## 2019-03-04 PROCEDURE — 99316 NF DSCHRG MGMT 30 MIN+: CPT | Performed by: NURSE PRACTITIONER

## 2019-03-04 ASSESSMENT — PAIN SCALES - GENERAL: PAINLEVEL: MILD PAIN (2)

## 2019-03-04 ASSESSMENT — MIFFLIN-ST. JEOR: SCORE: 1289.11

## 2019-03-04 NOTE — LETTER
3/4/2019        RE: Jaz[address]  [address]  [place] [address]          [place] [facility] DISCHARGE SUMMARY    PATIENT'S NAME: Jazmin Clifton  YOB: 1932  MEDICAL RECORD NUMBER:  [id number]  Place of Service where encounter took place[facility] [place] [[id number]]    PRIMARY CARE PROVIDER AND CLINIC RESPONSIBLE AFTER TRANSFER: Junie Estrella [address] [address] / [place] [address]     TRANSFERRING PROVIDERS: MARY Gómez CNP, Dr. Gigi MD  DATE OF SNF ADMISSION:  February / 25 / 2019  DATE OF SNF (anticipated) DISCHARGE: March / 06 / 2019  DISCHARGE DISPOSITION: [facility] Provider   RECENT [facility]OSP[facility]IZ[facility].     CODE STATUS/ADVANCE DIRECTIVES DISCUSSION:   DNR / DNI     Allergies   Allergen Reactions     Accupril      Ace Inhibitors Unknown     Accupril     Augmentin Unknown     Levofloxacin Unknown, Muscle Pain (Myalgia) and Other (See Comments)     Comment: myalgias, Description:   Pt prescribed ciprofloxacin in Jan 2018 (no rxn documented)  Myalgias       Morphine Other (See Comments)     hallucinations     Nitrofurantoin Nausea and Vomiting and Unknown     Norvasc [Amlodipine Besylate]      Leg swelling       Quinapril Other (See Comments) and Unknown     Hypertension. 3.29.18 - Takes and tolerates lisinopril  Patient reports HTN with as reaction to this medication       Condition on Discharge:  Improving.  Function:  SBA with walker in vizcarra on unit independent in room, LMT A1 with applying Jobst stockings  Cognitive Scores: SLUMS 27/30    Equipment: walker and wheelchair    DISCHARGE DIAGNOSIS:   1. Chronic obstructive pulmonary disease, unspecified COPD type (H)    2. Coronary artery disease involving native coronary artery of native heart without angina pectoris    3. Essential hypertension    4. Hyperlipidemia, unspecified hyperlipidemia type    5. Chronic  congestive heart failure, unspecified heart failure type (H)    6. Bilateral leg edema    7. Dementia without behavioral disturbance, unspecified dementia type    8. Depression with anxiety    9. History of CVA (cerebrovascular accident)    10. Spinal stenosis of lumbar region, unspecified whether neurogenic claudication present    11. S/P lumbar laminectomy    12. Gastroesophageal reflux disease without esophagitis    13. Slow transit constipation    14. CKD (chronic kidney disease) stage 3, GFR 30-59 ml/min (H)    15. Iron deficiency anemia, unspecified iron deficiency anemia type    16. Congenital cystic kidney disease    17. Right groin mass - seroma        HPI Nursing Facility Course:  HPI information obtained from: facility chart records, facility staff, patient report and [facility] chart review.    Jazmin Clifton an 86 year old female who has a history of  hypertension, COPD, congenital cystic kidney disease, obesity, CHF, dementia who woke early AM of 2/19 with SOB, lightheadedness and dizziness and was sent from her halfway to the ED. She was initially noted to have a left sided facial droop, which resolved rapidly as well as hypoxia requiring 4L of oxygen. She was noted to have significant wheezing. MRI on 2/20 negative for new ischemia. CXR showed atelectasis, no fluid overload. She was given a dose of IV lasix, steroids and nebs with improvement. She was weaned to RA. Lymphwraps for BLE edema were continued. When medically stable she was discharged back to TCU for further rehab and medical management.     Chronic obstructive pulmonary disease, unspecified COPD type (H)  Presented with hypoxia, wheezing. CXR showed atelectasis. CT showed no infiltrate or pleural effusion. Improved with lasix, steroids, nebs. She completed 5 day prednisone burst (2/25/19), continues on Xopenex nebs PRN, flonase nasal spray BID, Breo ellipta  1 puff daily, Mucinex 600mg BID x 10 days (last dose 3/4). Remains on RA with no  "respiratory concerns in TCU.      Coronary artery disease involving native coronary artery of native heart without angina pectoria  Essential hypertension  Hyperlipidemia, unspecified hyperlipidemia type  Noted to have intermittently elevated BP, but suspect pain contributing, thought renal disease could be contributing. Renal US showed cysts, no other abnormalities. Continue lasix 40mg q AM, 20mg q afternoon, lisinopril 20mg daily, imdur 30mg daily, ASA 81mg daily, atorvastatin 40mg daily, isradipine 5mg daily.    Chronic congestive heart failure, unspecified heart failure type (H)  Bilateral leg edema  No s/s of exacerbation - no fluid overload noted on CXR, BNP WNL. Echo 2/19 showed ED of 55-60%, mild LVH, grade II diastolic dysfunction. Respiratory status did improve with IV lasix. Has chronic LE edema, was treated with lymph wraps with improvement. She is discharged on PTA lasix 40gm qAM and 20mg q afternoon, lisinopril 20mg daily. She will continue with daily weights, low salt diet, Jobst stockings.     Dementia without behavioral disturbance, unspecified dementia type  SLUMS 27/30. She is on donepezil, memantine.     Depression with anxiety  Continue duloxetine 30mg daily     History of CVA (cerebrovascular accident)  At neurological baseline. Head CT negative.  MRI negative for new ischemia or old infarct. 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  Remains on statin, ASA.     Spinal stenosis of lumbar region, unspecified whether neurogenic claudication present  S/P lumbar laminectomy 1/19/19 with Dr. Morris.  Was changed from Norco TID and to oxycodone PRN while IP. Will add oxycodone BID on TCU discharge as has been using at " least twice daily in TCU. Continue APAP 1000mg TID, oxycodone 5mg q6h PRN, tizanidine 2mg TID PRN, PT/OT, monitor and adjust. Will need to reschedule f/u apt with Dr Morris as she missed d/t hospital stay/TCU admission    Gastroesophageal reflux disease without esophagitis  Continue omeprazole 40mg q AM    Slow transit constipation  Having regular bowel movements, she is on senna-s PRN.     CKD (chronic kidney disease) stage 3, GFR 30-59 ml/min (H)  Congenital cystic kidney disease  Baseline 1.2-1.5. Renal US showed benign cysts, otherwise negative. Creatinine stable     Iron deficiency anemia, unspecified iron deficiency anemia type  Normal B12 and folate, iron low at 27, baseline Hbg ~10. Iron supplement increased to BID. [place] recheck hgb/iron levels in 4-6 weeks.     Right groin mass - seroma  Diagnosed via US on 10/2018. Was seen be general surgery and vascular surgery - felt to be result of lypmhnode involvement in previous surgery to remove groin mass. She was recommended to wear Spanks compression; did not feel draining would be beneficial as it is likely to re-ocurr. Has not been compliant with spanks as she feels they are too constricting. Has ordered alternative garments for compression and reports she plans to wear them regularly.       PAST MEDICAL HISTORY:  has a past medical history of ABDOMINAL PAIN GENERALIZED (3/15/2006), Abdominal pain, generalized (3/15/2006), Atherosclerosis of renal artery (H), BENIGN HYPERTENSION (5/1/2006), Benign neoplasm of scalp and skin of neck, Cerebral aneurysm, nonruptured, Depressive disorder, not elsewhere classified, Esophageal reflux, Female stress incontinence, Gastrointestinal malfunction arising from mental factors, Generalized osteoarthrosis, unspecified site, Herpes zoster dermatitis of eyelid, Insomnia, unspecified, Lumbago, Other chest pain, Other specified cardiac dysrhythmias(427.89), Rectocele, Unspecified disorder of kidney and ureter, and Unspecified  essential hypertension.    DISCHARGE MEDICATIONS:  Current Outpatient Medications   Medication Sig Dispense Refill     acetaminophen (TYLENOL) 500 MG tablet Take 2 tablets (1,000 mg) by mouth 3 times daily 180 tablet 0     Alendronate Sodium (FOSAMAX PO) Take 70 mg by mouth once a week Take 1 tab once weekly (Thursday) on an empty stomach with full glass of water. Remain upright for 30 minutes. Wait 30 minutes before eating       Ascorbic Acid (VITAMIN C PO) Take 500 mg by mouth daily       aspirin 81 MG chewable tablet Take 81 mg by mouth       atorvastatin (LIPITOR) 40 MG tablet TAKE 1 TABLET BY MOUTH ONCE DAILY (Patient taking differently: TAKE 1 TABLET BY MOUTH at bedtime) 31 tablet 98     CALCIUM 600+D 600-200 MG-UNIT TABS TAKE 1 TAB BY MOUTH ONCE DAILY 30 tablet 11     calcium carbonate (TUMS) 500 MG chewable tablet Take 1 chew tab by mouth every hour as needed for heartburn       DONEPEZIL HCL PO Take 5 mg by mouth daily       DULoxetine HCl (CYMBALTA PO) Take 30 mg by mouth daily       ferrous sulfate (FEROSUL) 325 (65 Fe) MG tablet Take 325 mg by mouth 2 times daily       fluticasone (FLONASE) 50 MCG/ACT nasal spray Spray 1 spray into both nostrils 2 times daily       fluticasone-vilanterol (BREO ELLIPTA) 100-25 MCG/INH oral inhaler Inhale 1 puff into the lungs daily       Furosemide (LASIX PO) Take 20 mg by mouth daily In the afternoon       furosemide (LASIX) 40 MG tablet Take 40 mg by mouth every morning        GABAPENTIN PO Take 100 mg by mouth every morning       GABAPENTIN PO Take 200 mg by mouth At Bedtime        hypromellose-dextran (ARTIFICAL TEARS) 0.1-0.3 % ophthalmic solution Place 1 drop into both eyes every 6 hours as needed for dry eyes       isosorbide mononitrate (IMDUR) 30 MG 24 hr tablet TAKE 1 TABLET BY MOUTH ONCE DAILY 31 tablet 98     isradipine (DYNACIRC) 5 MG capsule TAKE 1 CAPSULE BY MOUTH TWICE DAILY 62 capsule 98     levalbuterol (XOPENEX) 1.25 MG/3ML neb solution Take 1 ampule by  "nebulization every 4 hours as needed for shortness of breath / dyspnea or wheezing And every 4 hours PRN       lisinopril (PRINIVIL/ZESTRIL) 10 MG tablet Take 2 tablets (20 mg) by mouth daily 30 tablet 3     MAGNESIUM OXIDE PO Take 250 mg by mouth daily       melatonin 3 MG CAPS Take 3 mg by mouth At Bedtime       memantine (NAMENDA) 5 MG tablet TAKE 1 TABLET BY MOUTH ONCE DAILY 31 tablet 98     Menthol, Topical Analgesic, (BIOFREEZE) 4 % GEL Externally apply topically 4 times daily as needed Apply topically to right hip and buttock 4 times per day. 8am, 2pm, 4pm, and 8pm       nystatin (MYCOSTATIN) 375023 UNIT/GM POWD Apply topically 2 times daily as needed        OMEPRAZOLE PO Take 40 mg by mouth every morning       oxyCODONE HCl (OXAYDO) 5 MG TABA Take 5 mg by mouth every 4 hours as needed       senna-docusate (SENOKOT-S/PERICOLACE) 8.6-50 MG tablet Take 1 tablet by mouth 2 times daily as needed        tiZANidine (ZANAFLEX) 2 MG tablet Take 2 mg by mouth 3 times daily as needed for muscle spasms        guaiFENesin (MUCINEX) 600 MG 12 hr tablet Take 1 tablet (600 mg) by mouth 2 times daily for 10 days 20 tablet 0       MEDICATION CHANGES/RATIONALE:   Increase iron supplement to BID  Change senna-s to PRN    Controlled medications sent with patient:   Medication: 29 , oxycodone tabs given to patient at the time of discharge to take home  Script for oxycodone  medication for 240  tabs and 0 refills given to patient at dischage to have them fill at their out patient pharmacy(electronic RX)     ROS:    10 point ROS of systems including Constitutional, Eyes, Respiratory, Cardiovascular, Gastroenterology, Genitourinary, Integumentary, Musculoskeletal, Psychiatric were all negative except for pertinent positives noted in my HPI.    Physical Exam:   Vitals: /77   Pulse 69   Temp 97.8  F (36.6  C)   Resp 19   Ht 1.6 m (5' 3\")   Wt 88 kg (194 lb)   SpO2 97%   BMI 34.37 kg/m     BMI= Body mass index is 34.37 " kg/m .    GENERAL APPEARANCE:  Alert, in no distress  ENT:  Mouth and posterior oropharynx normal, moist mucous membranes, hearing acuity mildly [place]  RESP:  respiratory effort and palpation of chest normal, no respiratory distress, Lung sounds clear t/o, on RA, no cough noted  CV:  Palpation and auscultation of heart done , rate and rhythm regular, no murmur, no rub or gallop, Edema 1-2 BLE, compression stockings in place, 2+ pedal pulses  ABDOMEN:  normal bowel sounds, soft, nontender, no hepatosplenomegaly or other masses  M/S:   Gait and station small, slow steps, slightly unsteady gait, PETERSEN purposefully, no gross joint deformities  SKIN:  Inspection/Palpation of skin and subcutaneous tissue healed surgical incision to lumbar spine, palpable fluid filled mass to right groin, nontender  NEURO: 2-12 in normal limits and at patient's baseline  PSYCH:  insight and judgement, memory fair , affect and mood normal    DISCHARGE PLAN:  [facility] with outpatient Physical Therapy  Patient instructed to follow-up with:  PCP in 7 days      Current [place] scheduled appointments:  Future Appointments   Date Time Provider Department Center   3/4/2019 12:00 PM Junie Estrella, APRN CNP [facility]       MT referral needed and placed by this provider: No    Pending labs: None  SNF labs   CBC RESULTS:   Recent Labs   Lab Test 02/27/19  0753 02/22/19  0622   WBC 9.2 6.5   RBC 4.27 3.81   HGB 11.5* 10.2*   HCT 38.8 34.3*   MCV 91 90   MCH 26.9 26.8   MCHC 29.6* 29.7*   RDW 18.3* 18.7*    240       Last Basic Metabolic Panel:  Recent Labs   Lab Test 02/27/19  0753 02/22/19  0622    143   POTASSIUM 3.9 4.7   CHLORIDE 107 108   BLAIR 9.0 9.2   CO2 32 30   BUN 42* 35*   CR 1.16* 1.09*   * 112*       Liver Function Studies -   Recent Labs   Lab Test 02/22/19  0622 02/21/19  0711   PROTTOTAL 6.2* 5.9*   ALBUMIN 3.2* 3.1*   BILITOTAL 0.4 0.3   ALKPHOS 67 57   AST 10 14   ALT 18 14        TSH   Date Value Ref Range Status   08/14/2017 0.62 0.40 - 4.00 mU/L Final   04/18/2007 0.60 0.4 - 5.0 mU/L Final   ]    Lab Results   Component Value Date    A1C 6.2 08/31/2017         Discharge Treatments:  Outpatient PT at the [facility] on daily  Daily weights  Medications as above      TOTAL DISCHARGE TIME:   Greater than 30 minutes  Electronically signed by:  MARY Gómez CNP      Sincerely,        MARY Gómez CNP

## 2019-03-07 ENCOUNTER — OFFICE VISIT - HEALTHEAST (OUTPATIENT)
Dept: NEUROSURGERY | Facility: CLINIC | Age: 84
End: 2019-03-07

## 2019-03-07 DIAGNOSIS — M48.062 SPINAL STENOSIS, LUMBAR REGION, WITH NEUROGENIC CLAUDICATION: ICD-10-CM

## 2019-03-07 ASSESSMENT — MIFFLIN-ST. JEOR: SCORE: 1283.64

## 2019-03-11 NOTE — PROGRESS NOTES
"[place] [facility]    Chief Complaint   Patient presents with     JUAN R       [facility] Medical Record Number:  [id number]  Place of Service where encounter took place:  [facility] [place] [facility] [[id number]]    HPI:    Jazmin Clifton is a 86 year old  (12/30/1932), who is being seen today for an episodic care visit.  HPI information obtained from: facility chart records, facility staff, patient report and [facility] Epic chart review.Today's concern is:     Acute pain of left shoulder  Depression with anxiety  Spinal stenosis of lumbar region, unspecified whether neurogenic claudication present  S/P lumbar laminectomy  Chronic pain syndrome  Chronic obstructive pulmonary disease, unspecified COPD type (H)   Patient seen today in assisted to f/u on recent discharge from TCU. Today she reports she is having significant pain in her left arm. States she is having a hard time lifting it and it feels swollen. Pain has been getting progressively worse over the past few weeks. Pain started after another resident fell into her in the elevated. She had had previous imaging of arm and wrist that showed no fracture, but can find no shoulder x-rays. Denies any numbness or tingling. Has been afebrile.     She had f/u with neurosurgery on 3/7, she feels she has had little improvement in her back pain since the surgery. They recommended PT and f/u MRI, which she declined d/t time. She states she was told she has \"chronic pain\" and they told her she could consider going to a pain clinic. She was hopefully the surgery would fix her pain, is disappointed that it did not. She has had limited mobility for a long time. Feels it is too hard to use her wheelchair, would like an electric scooter.    She denies any difficulty with her breathing, no chest pain, SOB, lightheadedness. Reported dark colors stools to facility staff, but was reminded that she is on a iron supplement. She denies any tarry stools, no abdominal " pain or constipation.     ALLERGIES: Accupril; Ace inhibitors; Augmentin; Levofloxacin; Morphine; Nitrofurantoin; Norvasc [amlodipine besylate]; and Quinapril  Past Medical, Surgical, Family and Social History reviewed and updated in [facility].    Current Outpatient Medications   Medication Sig Dispense Refill     acetaminophen (TYLENOL) 500 MG tablet Take 2 tablets (1,000 mg) by mouth 3 times daily 180 tablet 0     Alendronate Sodium (FOSAMAX PO) Take 70 mg by mouth once a week Take 1 tab once weekly (Thursday) on an empty stomach with full glass of water. Remain upright for 30 minutes. Wait 30 minutes before eating       Ascorbic Acid (VITAMIN C PO) Take 500 mg by mouth daily       aspirin 81 MG chewable tablet Take 81 mg by mouth       atorvastatin (LIPITOR) 40 MG tablet Take 40 mg by mouth daily AT BEDTIME       CALCIUM 600+D 600-200 MG-UNIT TABS TAKE 1 TAB BY MOUTH ONCE DAILY 30 tablet 11     calcium carbonate (TUMS) 500 MG chewable tablet Take 1 chew tab by mouth every hour as needed for heartburn       DONEPEZIL HCL PO Take 5 mg by mouth daily       DULoxetine (CYMBALTA) 30 MG capsule Take 1 capsule (30 mg) by mouth 2 times daily 60 capsule 11     DULoxetine (CYMBALTA) 30 MG capsule Take 30 mg by mouth 2 times daily       ferrous sulfate (FEROSUL) 325 (65 Fe) MG tablet Take 325 mg by mouth 2 times daily       fluticasone (FLONASE) 50 MCG/ACT nasal spray Spray 1 spray into both nostrils 2 times daily       fluticasone-vilanterol (BREO ELLIPTA) 100-25 MCG/INH oral inhaler Inhale 1 puff into the lungs daily       Furosemide (LASIX PO) Take 20 mg by mouth daily In the afternoon       furosemide (LASIX) 40 MG tablet Take 40 mg by mouth every morning        GABAPENTIN PO Take 100 mg by mouth every morning       GABAPENTIN PO Take 200 mg by mouth At Bedtime        hypromellose-dextran (ARTIFICAL TEARS) 0.1-0.3 % ophthalmic solution Place 1 drop into both eyes every 6 hours as needed for dry eyes       isosorbide  mononitrate (IMDUR) 30 MG 24 hr tablet TAKE 1 TABLET BY MOUTH ONCE DAILY 31 tablet 98     isradipine (DYNACIRC) 5 MG capsule TAKE 1 CAPSULE BY MOUTH TWICE DAILY 62 capsule 98     levalbuterol (XOPENEX) 1.25 MG/3ML neb solution Take 1 ampule by nebulization every 4 hours as needed for shortness of breath / dyspnea or wheezing And every 4 hours PRN       lisinopril (PRINIVIL/ZESTRIL) 10 MG tablet Take 2 tablets (20 mg) by mouth daily 30 tablet 3     MAGNESIUM OXIDE PO Take 250 mg by mouth daily       melatonin 3 MG CAPS Take 3 mg by mouth At Bedtime       memantine (NAMENDA) 5 MG tablet TAKE 1 TABLET BY MOUTH ONCE DAILY 31 tablet 98     Menthol, Topical Analgesic, (BIOFREEZE) 4 % GEL Externally apply topically 4 times daily as needed Apply topically to right hip and buttock 4 times per day. 8am, 2pm, 4pm, and 8pm       nystatin (MYCOSTATIN) 912497 UNIT/GM POWD Apply topically 2 times daily as needed        OMEPRAZOLE PO Take 40 mg by mouth every morning       oxyCODONE HCl (OXAYDO) 5 MG TABA Take 5 mg by mouth 3 times daily. May also take 5 mg 2 times daily as needed (severe pain). 60 each 0     oxyCODONE HCl (OXAYDO) 5 MG TABA Take 5 mg by mouth 3 times daily       oxyCODONE HCl (OXAYDO) 5 MG TABA Take 5 mg by mouth 2 times daily as needed       senna-docusate (SENOKOT-S/PERICOLACE) 8.6-50 MG tablet Take 1 tablet by mouth 2 times daily as needed        tiZANidine (ZANAFLEX) 2 MG tablet Take 2 mg by mouth 3 times daily as needed for muscle spasms        Medications reviewed:  Medications reconciled to facility chart and changes were made to reflect current medications as identified as above med list. Below are the changes that were made:   Medications stopped since last EPIC medication reconciliation:   There are no discontinued medications.    Medications started since last [facility] medication reconciliation:  No orders of the defined types were placed in this encounter.        REVIEW OF SYSTEMS:  4 point ROS including  Respiratory, CV, GI and , other than that noted in the HPI,  is negative    Physical Exam:  /80   Pulse 80   Temp 97.9  F (36.6  C)   Resp 18   Wt 88 kg (194 lb)   BMI 34.37 kg/m      GENERAL APPEARANCE:  Alert, in mild distress  RESP:  respiratory effort and palpation of chest normal, no respiratory distress, Lung sounds clear, on RA  CV:  Palpation and auscultation of heart done , rate and rhythm regular, no murmur, no rub or gallop, Edema 1-2+ BLE  ABDOMEN:  normal bowel sounds, soft, nontender, no hepatosplenomegaly or other masses  M/S:   Gait and station small steps, decreased ROM and tenderness to right shoulder, mild edema, no erythema.  SKIN:  Inspection/Palpation of skin and subcutaneous tissue baseline  NEURO: 2-12 in normal limits and at patient's baseline  PSYCH:  insight and judgement, memory fair , affect and mood normal    Recent Labs:     CBC RESULTS:   Recent Labs   Lab Test 02/27/19  0753 02/22/19  0622   WBC 9.2 6.5   RBC 4.27 3.81   HGB 11.5* 10.2*   HCT 38.8 34.3*   MCV 91 90   MCH 26.9 26.8   MCHC 29.6* 29.7*   RDW 18.3* 18.7*    240       Last Basic Metabolic Panel:  Recent Labs   Lab Test 02/27/19  0753 02/22/19  0622    143   POTASSIUM 3.9 4.7   CHLORIDE 107 108   BLAIR 9.0 9.2   CO2 32 30   BUN 42* 35*   CR 1.16* 1.09*   * 112*       Liver Function Studies -   Recent Labs   Lab Test 02/22/19  0622 02/21/19  0711   PROTTOTAL 6.2* 5.9*   ALBUMIN 3.2* 3.1*   BILITOTAL 0.4 0.3   ALKPHOS 67 57   AST 10 14   ALT 18 14       TSH   Date Value Ref Range Status   08/14/2017 0.62 0.40 - 4.00 mU/L Final   04/18/2007 0.60 0.4 - 5.0 mU/L Final   ]    Lab Results   Component Value Date    A1C 6.2 08/31/2017     Assessment/Plan:  (M25.512) Acute pain of left shoulder  (primary encounter diagnosis)  Comment: Unclear etiology - started after another patient feel into her while in the elevator. Previously imaging negative. No erythema, only mild edema near joint.   Plan: Will  increase oxycodone to TID and BID PRN, ice as tolerated, biofreeze. Will order shoulder x-ray today.     (F41.8) Depression with anxiety  Comment: report she is feeling more depressed, staff note anxiety as she frequently worries about her health  Plan: DULoxetine (CYMBALTA) 30 MG capsule - increase to BID, may also help with chronic pain.     (M48.061) Spinal stenosis of lumbar region, unspecified whether neurogenic claudication present  (Z98.890) S/P lumbar laminectomy  (G89.4) Chronic pain syndrome  Comment: Chronic, likely little further improvement  Plan: DULoxetine (CYMBALTA) 30 MG capsule - increased to BID, increase oxycodone 5mg TID and BID PRN, gabapentin BID, continue outpatient PT, follow-up with neurosurgery for MRI. Consider referral to pain clinic    (J44.9) Chronic obstructive pulmonary disease, unspecified COPD type (H)  Comment: stable, breathing is at baseline.   Plan: Xopenex nebs PRN, flonase nasal spray BID, Breo ellipta  1 puff daily, monitor    Orders:  1. Left shoulder xray 2-3 views per technician discretion. Dx: L shoulder pain  2. Increase Oxycodone to 5 mg PO TID and 5 mg PO BID PRN.  3. Ice to left shoulder TID PRN as tolerated.  4. Increase Duloxetine to 30 mg PO BID . Dx: depression, chronic pain  5. BMP, Hgb in 2 weeks on lab day    Electronically signed by  MARY Gómez CNP

## 2019-03-12 ENCOUNTER — DOCUMENTATION ONLY (OUTPATIENT)
Dept: OTHER | Facility: CLINIC | Age: 84
End: 2019-03-12

## 2019-03-12 ENCOUNTER — ASSISTED LIVING VISIT (OUTPATIENT)
Dept: GERIATRICS | Facility: CLINIC | Age: 84
End: 2019-03-12
Payer: COMMERCIAL

## 2019-03-12 ENCOUNTER — AMBULATORY - HEALTHEAST (OUTPATIENT)
Dept: OTHER | Facility: CLINIC | Age: 84
End: 2019-03-12

## 2019-03-12 VITALS
RESPIRATION RATE: 18 BRPM | TEMPERATURE: 97.9 F | SYSTOLIC BLOOD PRESSURE: 173 MMHG | WEIGHT: 194 LBS | HEART RATE: 80 BPM | DIASTOLIC BLOOD PRESSURE: 80 MMHG | BODY MASS INDEX: 34.37 KG/M2

## 2019-03-12 DIAGNOSIS — M25.512 ACUTE PAIN OF LEFT SHOULDER: Primary | ICD-10-CM

## 2019-03-12 DIAGNOSIS — G89.4 CHRONIC PAIN SYNDROME: ICD-10-CM

## 2019-03-12 DIAGNOSIS — J44.9 CHRONIC OBSTRUCTIVE PULMONARY DISEASE, UNSPECIFIED COPD TYPE (H): ICD-10-CM

## 2019-03-12 DIAGNOSIS — F41.8 DEPRESSION WITH ANXIETY: ICD-10-CM

## 2019-03-12 DIAGNOSIS — Z98.890 S/P LUMBAR LAMINECTOMY: ICD-10-CM

## 2019-03-12 DIAGNOSIS — M48.061 SPINAL STENOSIS OF LUMBAR REGION, UNSPECIFIED WHETHER NEUROGENIC CLAUDICATION PRESENT: ICD-10-CM

## 2019-03-12 RX ORDER — DULOXETIN HYDROCHLORIDE 30 MG/1
30 CAPSULE, DELAYED RELEASE ORAL 2 TIMES DAILY
COMMUNITY
End: 2019-04-24

## 2019-03-12 RX ORDER — DULOXETIN HYDROCHLORIDE 30 MG/1
30 CAPSULE, DELAYED RELEASE ORAL 2 TIMES DAILY
Qty: 60 CAPSULE | Refills: 11 | Status: SHIPPED | OUTPATIENT
Start: 2019-03-12 | End: 2019-04-02

## 2019-03-12 RX ORDER — ATORVASTATIN CALCIUM 40 MG/1
40 TABLET, FILM COATED ORAL DAILY
COMMUNITY
End: 2019-04-09

## 2019-03-14 NOTE — PROGRESS NOTES
3-14-19   CC had not received CP from AL so CC called and talked with RN and requested this again.  CC also has attempted to reach member again with no success.  CC will be at the facility tomorrow and will attempt to stop and see member.   Mirian Weldon MA [facility] Care Coordinator   [phone]      3-8-19   CC reached out to AL staff to see how member was doing in and if there were any changed or concerns.  RN staff said member is dong well with no concerns at this time.  Member is back to baseline.  CC asked for update CP fro AL for members chart.  CC will attempt to reach member to follow up   Mirian Weldon MA [facility] Care Coordinator   [phone]        3-1-19   CC received notice from BONITA at the TCU that member is doing well and is planning on discharge back to the AL on 3-5-19.  C will plan to follow up with staff and member at this time after discharge.   Mirian Weldon MA [facility] Coordinator   [phone]

## 2019-03-27 ENCOUNTER — HOSPITAL LABORATORY (OUTPATIENT)
Facility: OTHER | Age: 84
End: 2019-03-27

## 2019-03-27 ENCOUNTER — TELEPHONE (OUTPATIENT)
Dept: GERIATRICS | Facility: CLINIC | Age: 84
End: 2019-03-27

## 2019-03-27 DIAGNOSIS — N39.0 URINARY TRACT INFECTION WITHOUT HEMATURIA, SITE UNSPECIFIED: Primary | ICD-10-CM

## 2019-03-27 LAB
ALBUMIN UR-MCNC: NEGATIVE MG/DL
APPEARANCE UR: ABNORMAL
BACTERIA #/AREA URNS HPF: ABNORMAL /HPF
BILIRUB UR QL STRIP: NEGATIVE
COLOR UR AUTO: YELLOW
GLUCOSE UR STRIP-MCNC: NEGATIVE MG/DL
HGB UR QL STRIP: NEGATIVE
HYALINE CASTS #/AREA URNS LPF: 3 /LPF (ref 0–2)
KETONES UR STRIP-MCNC: NEGATIVE MG/DL
LEUKOCYTE ESTERASE UR QL STRIP: ABNORMAL
MUCOUS THREADS #/AREA URNS LPF: PRESENT /LPF
NITRATE UR QL: NEGATIVE
PH UR STRIP: 5 PH (ref 5–7)
RBC #/AREA URNS AUTO: 2 /HPF (ref 0–2)
SOURCE: ABNORMAL
SP GR UR STRIP: 1.01 (ref 1–1.03)
SQUAMOUS #/AREA URNS AUTO: <1 /HPF (ref 0–1)
UROBILINOGEN UR STRIP-MCNC: 0 MG/DL (ref 0–2)
WBC #/AREA URNS AUTO: 156 /HPF (ref 0–5)

## 2019-03-27 RX ORDER — CEFDINIR 300 MG/1
300 CAPSULE ORAL 2 TIMES DAILY
Qty: 14 CAPSULE | Refills: 0 | Status: SHIPPED | OUTPATIENT
Start: 2019-03-27 | End: 2019-04-24

## 2019-03-28 NOTE — TELEPHONE ENCOUNTER
Staff at assisted living called and reported patient c/o burning with urination, body aches. She was afebrile. UA/UC ordered - UA returned with large leukocyte esterase, pyuria, few bacteria. Was treated with course of cipro in 2/2018 for UTI - pan sensitive e.coli. Has allergy to nitrofurantoin.     Estimated Creatinine Clearance: 36.6 mL/min (A) (based on SCr of 1.16 mg/dL (H)).     Will order cefdinir 300mg PO BID x 7 days - will avoid Bactrim at this time given CKD history. BMP on next lab draw. Adjust antibiotic based on UC results.     Junie Estrella, APRN, CNP

## 2019-03-29 ENCOUNTER — COMMUNICATION - HEALTHEAST (OUTPATIENT)
Dept: RESPIRATORY THERAPY | Facility: HOSPITAL | Age: 84
End: 2019-03-29

## 2019-03-29 DIAGNOSIS — M81.0 AGE-RELATED OSTEOPOROSIS WITHOUT CURRENT PATHOLOGICAL FRACTURE: Primary | ICD-10-CM

## 2019-03-29 RX ORDER — ALENDRONATE SODIUM 70 MG/1
TABLET ORAL
Qty: 12 TABLET | Refills: 3 | Status: SHIPPED | OUTPATIENT
Start: 2019-03-29 | End: 2019-04-02

## 2019-04-01 NOTE — PROGRESS NOTES
"[facility] [facility]  [place] Medical Record Number:  [id number]  Place of Service where encounter took place:  [facility] ([facility]) [[id number]]  Chief Complaint   Patient presents with     RECHECK       HPI:    Jazmin Clifton  is a 86 year old (12/30/1932), who is being seen today for an episodic care visit.  HPI information obtained from: facility chart records, facility staff, patient report and [facility] chart review. Today's concern is:     Urinary tract infection without hematuria, site unspecified  Spinal stenosis of lumbar region, unspecified whether neurogenic claudication present  S/P lumbar laminectomy  Chronic pain syndrome  Right groin mass   Patient requested to be seen today as she is concerned about her seroma. Report it is getting bigger and becoming more painful. This has been a chronic ongoing issue, Developed seroma ~ 6 months ago. Was seen by surgery who felt seroma related to lymph node involvement during removal or right groin mass. Recommended she wear compression such as spanks to control fluid accumulation. Likely only short term relief with drainage as likely to re-occur. She is not compliant with spanks or other compression garments. States that they are too tight and make it \"hard to breathe.\" Discussed with patient that seroma will likely continue to grow and remain uncomfortable without compression. Nursing staff report they applied a heat pack to site yesterday with some relief.     She is also concerned about her chronic back and hip pain. States the neurosurgeon told her she has chronic pain and recommended she see pain clinc. She would like a referral to one.     Report dysuria to staff last week UA collected (see telephone encounter on 3/27). Was started cefdinir. Reports symptoms significantly improved, some mild discomfort with urination at times, remains afebrile.     Past Medical and Surgical History reviewed in Epic today.    MEDICATIONS:  Current " Outpatient Medications   Medication Sig Dispense Refill     acetaminophen (TYLENOL) 500 MG tablet Take 1,000 mg by mouth 3 times daily       Alendronate Sodium (FOSAMAX PO) Take 70 mg by mouth once a week Take 1 tab once weekly (Thursday) on an empty stomach with full glass of water. Remain upright for 30 minutes. Wait 30 minutes before eating       Ascorbic Acid (VITAMIN C PO) Take 500 mg by mouth daily       aspirin 81 MG chewable tablet Take 81 mg by mouth       atorvastatin (LIPITOR) 40 MG tablet Take 40 mg by mouth daily AT BEDTIME       CALCIUM 600+D 600-200 MG-UNIT TABS TAKE 1 TAB BY MOUTH ONCE DAILY 30 tablet 11     calcium carbonate (TUMS) 500 MG chewable tablet Take 1 chew tab by mouth every hour as needed for heartburn       cefdinir (OMNICEF) 300 MG capsule Take 1 capsule (300 mg) by mouth 2 times daily for 7 days 14 capsule 0     DONEPEZIL HCL PO Take 5 mg by mouth daily       DULoxetine (CYMBALTA) 30 MG capsule Take 1 capsule (30 mg) by mouth 2 times daily 60 capsule 11     DULoxetine (CYMBALTA) 30 MG capsule Take 30 mg by mouth 2 times daily       ferrous sulfate (FEROSUL) 325 (65 Fe) MG tablet Take 325 mg by mouth 2 times daily       fluticasone (FLONASE) 50 MCG/ACT nasal spray Spray 1 spray into both nostrils 2 times daily       fluticasone-vilanterol (BREO ELLIPTA) 100-25 MCG/INH oral inhaler Inhale 1 puff into the lungs daily       Furosemide (LASIX PO) Take 20 mg by mouth daily In the afternoon       furosemide (LASIX) 40 MG tablet Take 40 mg by mouth every morning        GABAPENTIN PO Take 100 mg by mouth every morning       GABAPENTIN PO Take 200 mg by mouth At Bedtime        hypromellose-dextran (ARTIFICAL TEARS) 0.1-0.3 % ophthalmic solution Place 1 drop into both eyes every 6 hours as needed for dry eyes       isosorbide mononitrate (IMDUR) 30 MG 24 hr tablet TAKE 1 TABLET BY MOUTH ONCE DAILY 31 tablet 98     isradipine (DYNACIRC) 5 MG capsule TAKE 1 CAPSULE BY MOUTH TWICE DAILY 62 capsule  98     levalbuterol (XOPENEX) 1.25 MG/3ML neb solution Take 1 ampule by nebulization every 4 hours as needed for shortness of breath / dyspnea or wheezing And every 4 hours PRN       lisinopril (PRINIVIL/ZESTRIL) 10 MG tablet Take 2 tablets (20 mg) by mouth daily 30 tablet 3     MAGNESIUM OXIDE PO Take 250 mg by mouth daily       melatonin 3 MG CAPS Take 3 mg by mouth At Bedtime       memantine (NAMENDA) 5 MG tablet TAKE 1 TABLET BY MOUTH ONCE DAILY 31 tablet 98     Menthol, Topical Analgesic, (BIOFREEZE) 4 % GEL Externally apply topically 4 times daily as needed Apply topically to right hip and buttock 4 times per day. 8am, 2pm, 4pm, and 8pm       nystatin (MYCOSTATIN) 769929 UNIT/GM POWD Apply topically 2 times daily as needed        OMEPRAZOLE PO Take 40 mg by mouth every morning       oxyCODONE HCl (OXAYDO) 5 MG TABA Take 5 mg by mouth 3 times daily - Take 5 mg 2 times per day as needed       senna-docusate (SENOKOT-S/PERICOLACE) 8.6-50 MG tablet Take 1 tablet by mouth 2 times daily as needed        tiZANidine (ZANAFLEX) 2 MG tablet Take 2 mg by mouth 3 times daily as needed for muscle spasms        alendronate (FOSAMAX) 70 MG tablet TAKE ONE TABLET BY MOUTH IN THE MORNING ONCE A WEEK ON THURSDAY 12 tablet 3     Blood Glucose Monitoring Suppl CORY 2 times daily Call nurse for further directions if blood glucose is less than 80 or greater than 250       mometasone (ASMANEX) 220 MCG/INH inhaler Inhale 1 puff into the lungs 2 times daily       oxyCODONE HCl (OXAYDO) 5 MG TABA Take 5 mg by mouth 3 times daily. May also take 5 mg 2 times daily as needed (severe pain). (Patient taking differently: No sig reported) 60 each 0     oxyCODONE HCl (OXAYDO) 5 MG TABA Take 5 mg by mouth 2 times daily as needed           REVIEW OF SYSTEMS:  4 point ROS including Respiratory, CV, GI and , other than that noted in the HPI,  is negative    Objective:  /78   Pulse 88   Temp 98.1  F (36.7  C)   Resp 18   Wt 92.3 kg (203  lb 6.4 oz)   SpO2 94%   BMI 36.03 kg/m    Exam:  GENERAL APPEARANCE:  Alert, in no distress, cooperative  RESP:  respiratory effort and palpation of chest normal, lungs clear to auscultation , no respiratory distress  CV:  Palpation and auscultation of heart done , regular rate and rhythm, no murmur, rub, or gallop, peripheral edema 1-2+ in BLE  ABDOMEN:  normal bowel sounds, soft, nontender, no hepatosplenomegaly or other masses  M/S:   no gross joint deformites  SKIN:  large fluid filled seroma palpable in right thigh groin, larger than on previous visit  NEURO:   Cranial nerves 2-12 are normal tested and grossly at patient's baseline  PSYCH:  oriented X 3, normal insight, judgement and memory, affect and mood normal, anxious at times.     Labs:   Recent labs in [facility] reviewed by me today.     ASSESSMENT/PLAN:  (N39.0) Urinary tract infection without hematuria, site unspecified  (primary encounter diagnosis)  Comment: improving  Plan: complete cefdinir 300mg BID x 7 days    (M48.061) Spinal stenosis of lumbar region, unspecified whether neurogenic claudication present  (Z98.890) S/P lumbar laminectomy  (G89.4) Chronic pain syndrome  Comment: S/p multiple back surgeries, most recent laminectomy 1/9/19. Continues to have significant pain. Mobility remains limited. Has been working with outpatient PT, but they note a decline in mobility. She has been requesting an electric wheelchair. She is also requesting referral to pain clinic as her neurosurgeon recommended she establish care with one.   Plan: PAIN MANAGEMENT REFERRAL, HOME CARE NURSING         REFERRAL        Will discontinue outpatient PT and order home care for mobility assessment, will work with  to attempt to get electric wheelchair. Given falls risk do not feel comfortable increasing narcotics. Will continue oxycodone 5mg TID and BID PRN, tizanidine 2mg TID PRN, Biofreeze QID PRN, gabapentin 100mg q AM and 200mg at bedtime, duloxetine 30mg  BID, referral placed to pain clinic. Continue to monitor BP as suspect elevated d/t anxiety and pain.     (R19.09) Right groin mass - seroma  Comment: Chronic, patient is not compliant with compression which is recommended treatment. Likely poor candidate for drainage as just likely to re-occur.   Plan: Patient reports plans to order new compression garments, discussed with  who will assess patient next week. OK to apply ACE wrap in the interim. Heat to site PRN, staff to assist with application of lotion prior to apply compression stockings per patient request, continue lasix 40mg q AM, 20mg q afternoon, continue to monitor.     transcribed by : Aliyah Milan  Orders:  1. Offer hot pack to R groin geroma PRN  2  Home PT/OT eval for mobility ([facility] order in Epic)  3. Apply lotion to BLE BID. Encourage patient to use compression - may apply ace wrap to right groin/thigh as tolerated.    Electronically signed by:  MARY Gómez CNP

## 2019-04-02 ENCOUNTER — PATIENT OUTREACH (OUTPATIENT)
Dept: GERIATRIC MEDICINE | Facility: CLINIC | Age: 84
End: 2019-04-02

## 2019-04-02 ENCOUNTER — ASSISTED LIVING VISIT (OUTPATIENT)
Dept: GERIATRICS | Facility: CLINIC | Age: 84
End: 2019-04-02
Payer: COMMERCIAL

## 2019-04-02 VITALS
HEART RATE: 88 BPM | WEIGHT: 203.4 LBS | TEMPERATURE: 98.1 F | DIASTOLIC BLOOD PRESSURE: 78 MMHG | RESPIRATION RATE: 18 BRPM | BODY MASS INDEX: 36.03 KG/M2 | OXYGEN SATURATION: 94 % | SYSTOLIC BLOOD PRESSURE: 187 MMHG

## 2019-04-02 DIAGNOSIS — Z98.890 S/P LUMBAR LAMINECTOMY: ICD-10-CM

## 2019-04-02 DIAGNOSIS — N39.0 URINARY TRACT INFECTION WITHOUT HEMATURIA, SITE UNSPECIFIED: Primary | ICD-10-CM

## 2019-04-02 DIAGNOSIS — R19.09 RIGHT GROIN MASS: ICD-10-CM

## 2019-04-02 DIAGNOSIS — G89.4 CHRONIC PAIN SYNDROME: ICD-10-CM

## 2019-04-02 DIAGNOSIS — M48.061 SPINAL STENOSIS OF LUMBAR REGION, UNSPECIFIED WHETHER NEUROGENIC CLAUDICATION PRESENT: ICD-10-CM

## 2019-04-02 PROCEDURE — 99309 SBSQ NF CARE MODERATE MDM 30: CPT | Performed by: NURSE PRACTITIONER

## 2019-04-02 PROCEDURE — 99207 ZZC CDG-CODE CATEGORY CHANGED: CPT | Performed by: NURSE PRACTITIONER

## 2019-04-02 RX ORDER — ACETAMINOPHEN 500 MG
1000 TABLET ORAL 3 TIMES DAILY
COMMUNITY
End: 2019-04-09

## 2019-04-02 NOTE — PROGRESS NOTES
4-2-19   CC was following up with AL in regards to member.  CC was approached by supervisor to provide some history about member multiple inpatient and SNF stays in the last year.  Member was new to [facility] as of 2-1 so CC reached out to discuss with RN staff.     Member has had multiple back surgeries which then led to TCU stays.  Member has also been refusing cares at the AL and stating she is in a great deal more pain.  Thru review Change in condition visit is needed at this time as a result of this.      CC will see member on 4-9 for this visit and then per NP also explore the options of electric wheelchair for member.      CC will f/u with supervisor on hx of back surgeries and IP/TCU stays.      Mirian Weldon MA [facility] Care Coordinator   [phone]

## 2019-04-02 NOTE — LETTER
"    4/2/2019        RE: [facility] [place]  [address]  [facility] [address]        [place] [facility]  [place] Medical Record Number:  [id number]  Place of Service where encounter took place:  [facility] [place] [facility] ([facility]) [[id number]]  Chief Complaint   Patient presents with     RECHECK       HPI:    Jazmin Clifton  is a 86 year old (12/30/1932), who is being seen today for an episodic care visit.  HPI information obtained from: facility chart records, facility staff, patient report and [facility] chart review. Today's concern is:     Urinary tract infection without hematuria, site unspecified  Spinal stenosis of lumbar region, unspecified whether neurogenic claudication present  S/P lumbar laminectomy  Chronic pain syndrome  Right groin mass   Patient requested to be seen today as she is concerned about her seroma. Report it is getting bigger and becoming more painful. This has been a chronic ongoing issue, Developed seroma ~ 6 months ago. Was seen by surgery who felt seroma related to lymph node involvement during removal or right groin mass. Recommended she wear compression such as spanks to control fluid accumulation. Likely only short term relief with drainage as likely to re-occur. She is not compliant with spanks or other compression garments. States that they are too tight and make it \"hard to breathe.\" Discussed with patient that seroma will likely continue to grow and remain uncomfortable without compression. Nursing staff report they applied a heat pack to site yesterday with some relief.     She is also concerned about her chronic back and hip pain. States the neurosurgeon told her she has chronic pain and recommended she see pain clinc. She would like a referral to one.     Report dysuria to staff last week UA collected (see telephone encounter on 3/27). Was started cefdinir. Reports symptoms significantly improved, some mild discomfort with urination at " times, remains afebrile.     Past Medical and Surgical History reviewed in Epic today.    MEDICATIONS:  Current Outpatient Medications   Medication Sig Dispense Refill     acetaminophen (TYLENOL) 500 MG tablet Take 1,000 mg by mouth 3 times daily       Alendronate Sodium (FOSAMAX PO) Take 70 mg by mouth once a week Take 1 tab once weekly (Thursday) on an empty stomach with full glass of water. Remain upright for 30 minutes. Wait 30 minutes before eating       Ascorbic Acid (VITAMIN C PO) Take 500 mg by mouth daily       aspirin 81 MG chewable tablet Take 81 mg by mouth       atorvastatin (LIPITOR) 40 MG tablet Take 40 mg by mouth daily AT BEDTIME       CALCIUM 600+D 600-200 MG-UNIT TABS TAKE 1 TAB BY MOUTH ONCE DAILY 30 tablet 11     calcium carbonate (TUMS) 500 MG chewable tablet Take 1 chew tab by mouth every hour as needed for heartburn       cefdinir (OMNICEF) 300 MG capsule Take 1 capsule (300 mg) by mouth 2 times daily for 7 days 14 capsule 0     DONEPEZIL HCL PO Take 5 mg by mouth daily       DULoxetine (CYMBALTA) 30 MG capsule Take 1 capsule (30 mg) by mouth 2 times daily 60 capsule 11     DULoxetine (CYMBALTA) 30 MG capsule Take 30 mg by mouth 2 times daily       ferrous sulfate (FEROSUL) 325 (65 Fe) MG tablet Take 325 mg by mouth 2 times daily       fluticasone (FLONASE) 50 MCG/ACT nasal spray Spray 1 spray into both nostrils 2 times daily       fluticasone-vilanterol (BREO ELLIPTA) 100-25 MCG/INH oral inhaler Inhale 1 puff into the lungs daily       Furosemide (LASIX PO) Take 20 mg by mouth daily In the afternoon       furosemide (LASIX) 40 MG tablet Take 40 mg by mouth every morning        GABAPENTIN PO Take 100 mg by mouth every morning       GABAPENTIN PO Take 200 mg by mouth At Bedtime        hypromellose-dextran (ARTIFICAL TEARS) 0.1-0.3 % ophthalmic solution Place 1 drop into both eyes every 6 hours as needed for dry eyes       isosorbide mononitrate (IMDUR) 30 MG 24 hr tablet TAKE 1 TABLET BY MOUTH  ONCE DAILY 31 tablet 98     isradipine (DYNACIRC) 5 MG capsule TAKE 1 CAPSULE BY MOUTH TWICE DAILY 62 capsule 98     levalbuterol (XOPENEX) 1.25 MG/3ML neb solution Take 1 ampule by nebulization every 4 hours as needed for shortness of breath / dyspnea or wheezing And every 4 hours PRN       lisinopril (PRINIVIL/ZESTRIL) 10 MG tablet Take 2 tablets (20 mg) by mouth daily 30 tablet 3     MAGNESIUM OXIDE PO Take 250 mg by mouth daily       melatonin 3 MG CAPS Take 3 mg by mouth At Bedtime       memantine (NAMENDA) 5 MG tablet TAKE 1 TABLET BY MOUTH ONCE DAILY 31 tablet 98     Menthol, Topical Analgesic, (BIOFREEZE) 4 % GEL Externally apply topically 4 times daily as needed Apply topically to right hip and buttock 4 times per day. 8am, 2pm, 4pm, and 8pm       nystatin (MYCOSTATIN) 738841 UNIT/GM POWD Apply topically 2 times daily as needed        OMEPRAZOLE PO Take 40 mg by mouth every morning       oxyCODONE HCl (OXAYDO) 5 MG TABA Take 5 mg by mouth 3 times daily - Take 5 mg 2 times per day as needed       senna-docusate (SENOKOT-S/PERICOLACE) 8.6-50 MG tablet Take 1 tablet by mouth 2 times daily as needed        tiZANidine (ZANAFLEX) 2 MG tablet Take 2 mg by mouth 3 times daily as needed for muscle spasms        alendronate (FOSAMAX) 70 MG tablet TAKE ONE TABLET BY MOUTH IN THE MORNING ONCE A WEEK ON THURSDAY 12 tablet 3     Blood Glucose Monitoring Suppl CORY 2 times daily Call nurse for further directions if blood glucose is less than 80 or greater than 250       mometasone (ASMANEX) 220 MCG/INH inhaler Inhale 1 puff into the lungs 2 times daily       oxyCODONE HCl (OXAYDO) 5 MG TABA Take 5 mg by mouth 3 times daily. May also take 5 mg 2 times daily as needed (severe pain). (Patient taking differently: No sig reported) 60 each 0     oxyCODONE HCl (OXAYDO) 5 MG TABA Take 5 mg by mouth 2 times daily as needed           REVIEW OF SYSTEMS:  4 point ROS including Respiratory, CV, GI and , other than that noted in the  HPI,  is negative    Objective:  /78   Pulse 88   Temp 98.1  F (36.7  C)   Resp 18   Wt 92.3 kg (203 lb 6.4 oz)   SpO2 94%   BMI 36.03 kg/m     Exam:  GENERAL APPEARANCE:  Alert, in no distress, cooperative  RESP:  respiratory effort and palpation of chest normal, lungs clear to auscultation , no respiratory distress  CV:  Palpation and auscultation of heart done , regular rate and rhythm, no murmur, rub, or gallop, peripheral edema 1-2+ in BLE  ABDOMEN:  normal bowel sounds, soft, nontender, no hepatosplenomegaly or other masses  M/S:   no gross joint deformites  SKIN:  large fluid filled seroma palpable in right thigh groin, larger than on previous visit  NEURO:   Cranial nerves 2-12 are normal tested and grossly at patient's baseline  PSYCH:  oriented X 3, normal insight, judgement and memory, affect and mood normal, anxious at times.     Labs:   Recent labs in [facility] reviewed by me today.     ASSESSMENT/PLAN:  (N39.0) Urinary tract infection without hematuria, site unspecified  (primary encounter diagnosis)  Comment: improving  Plan: complete cefdinir 300mg BID x 7 days    (M48.061) Spinal stenosis of lumbar region, unspecified whether neurogenic claudication present  (Z98.890) S/P lumbar laminectomy  (G89.4) Chronic pain syndrome  Comment: S/p multiple back surgeries, most recent laminectomy 1/9/19. Continues to have significant pain. Mobility remains limited. Has been working with outpatient PT, but they note a decline in mobility. She has been requesting an electric wheelchair. She is also requesting referral to pain clinic as her neurosurgeon recommended she establish care with one.   Plan: PAIN MANAGEMENT REFERRAL, HOME CARE NURSING         REFERRAL        Will discontinue outpatient PT and order home care for mobility assessment, will work with  to attempt to get electric wheelchair. Given falls risk do not feel comfortable increasing narcotics. Will continue oxycodone 5mg TID and BID  PRN, tizanidine 2mg TID PRN, Biofreeze QID PRN, gabapentin 100mg q AM and 200mg at bedtime, duloxetine 30mg BID, referral placed to pain clinic. Continue to monitor BP as suspect elevated d/t anxiety and pain.     (R19.09) Right groin mass - seroma  Comment: Chronic, patient is not compliant with compression which is recommended treatment. Likely poor candidate for drainage as just likely to re-occur.   Plan: Patient reports plans to order new compression garments, discussed with  who will assess patient next week. OK to apply ACE wrap in the interim. Heat to site PRN, staff to assist with application of lotion prior to apply compression stockings per patient request, continue lasix 40mg q AM, 20mg q afternoon, continue to monitor.     transcribed by : Aliyah Milan  Orders:  1. Offer hot pack to R groin geroma PRN  2  Home PT/OT eval for mobility ([facility] order in Epic)  3. Apply lotion to BLE BID. Encourage patient to use compression - may apply ace wrap to right groin/thigh as tolerated.    Electronically signed by:  MARY Gómez CNP             Sincerely,        MARY Gómez CNP

## 2019-04-04 ENCOUNTER — HOSPITAL LABORATORY (OUTPATIENT)
Facility: OTHER | Age: 84
End: 2019-04-04

## 2019-04-04 ENCOUNTER — TELEPHONE (OUTPATIENT)
Dept: PALLIATIVE MEDICINE | Facility: CLINIC | Age: 84
End: 2019-04-04

## 2019-04-04 LAB
ANION GAP SERPL CALCULATED.3IONS-SCNC: 6 MMOL/L (ref 3–14)
BUN SERPL-MCNC: 31 MG/DL (ref 7–30)
CALCIUM SERPL-MCNC: 9.9 MG/DL (ref 8.5–10.1)
CHLORIDE SERPL-SCNC: 105 MMOL/L (ref 94–109)
CO2 SERPL-SCNC: 29 MMOL/L (ref 20–32)
CREAT SERPL-MCNC: 1.16 MG/DL (ref 0.52–1.04)
GFR SERPL CREATININE-BSD FRML MDRD: 43 ML/MIN/{1.73_M2}
GLUCOSE SERPL-MCNC: 99 MG/DL (ref 70–99)
HGB BLD-MCNC: 13 G/DL (ref 11.7–15.7)
POTASSIUM SERPL-SCNC: 4.1 MMOL/L (ref 3.4–5.3)
SODIUM SERPL-SCNC: 140 MMOL/L (ref 133–144)

## 2019-04-04 NOTE — TELEPHONE ENCOUNTER
[facility] Referral      1. Confirmed address with patient? Yes  2. Confirmed phone number with patient? Yes  3. Confirmed referring provider? Yes  4. Is the PCP the same as the referring provider? Yes  5. Has the patient been to any previous pain clinics? No  (If yes, send FREEMAN with welcome letter)  6. Which insurance are we to bill for this appointment?  [employer]    7. Informed pt of cancellation (48 hour) policy? Yes    REGARDING OPIOID MEDICATIONS: We will always address appropriateness of opioid pain medications, but we generally will not automatically take on a prescribing role. When we do take on prescribing of opioids for chronic pain, it is in collaboration with the referring physician for an intermediate period of time (months), with an expectation that the primary physician or provider will assume the prescribing role if medications are effective at stable doses with demonstrated compliance. Therefore, please do not assume that your prescribing responsibilities end on the day of pain clinic consultation.  8. Informed pt of prescribing policy? Yes    9.Please be aware that once you are established with a pain provider and location, you will need to continue have all future visits with that provider and location. It is best to determine what location is the most convenient for you and schedule with that one.    ** PATIENT INFORMED OF THIS POLICY Yes      9. Referring Provider: Junie Estrella    [facility]

## 2019-04-04 NOTE — LETTER
April 4, 2019    Jazmin ABAD ON [place]  [address]  [facility] [address]    Dear Jazmin,                                                                 Welcome to the [facility].  We are located on the 2nd floor ([address]) of the [facility], located at [address] [place], [place] [address], [place] [address].    Your appointment at the [facility] has been scheduled on Tuesday, MAY 14TH at 1:30PM with Mirian Vazquez NP.    At your first visit, you will meet your team of caregivers who will help you to develop pain management strategies that will last a lifetime. You will meet with our support staff to review your insurance information, and collect your co-payment if required by your insurance company. You will also meet with a medical pain specialist and care coordinator who will assess your pain and develop a plan of care for your successful pain rehabilitation. You should expect to spend 1-2 hours at your first visit with us. Usually, patients work with us for a period of 6-12 months, and eventually return to their primary doctor once their pain management has stabilized.      To help us make your visit go as smoothly as possible, please bring the following items with you on your visit:     Completed Pain Questionnaire enclosed in this packet.  If you do not bring the completed questionnaire, we may have to reschedule your appointment.  List of any medicines that you are currently taking or have been prescribed  Important NON-[place] medical information such as medical records or tests results (X-rays, or laboratory tests)  Your health insurance card  Financial resources to cover your co-payment or balance due at the time of service (cash, personal check, Visa, and MasterCard are acceptable methods of payment)     Due to the demand for new patient evaluations, you must notify the scheduling department 48 hours in advance if you are not able to keep this  appointment. Failure to do so could affect your ability to reschedule with our clinic. Please be aware that we will not prescribe any medications at your first visit.     Please call [phone] with any questions regarding your appointment. We look forward to meeting you and working to address your health care needs.     Sincerely,      [facility]

## 2019-04-09 ENCOUNTER — PATIENT OUTREACH (OUTPATIENT)
Dept: GERIATRIC MEDICINE | Facility: CLINIC | Age: 84
End: 2019-04-09

## 2019-04-09 DIAGNOSIS — G30.9 ALZHEIMER'S DEMENTIA WITHOUT BEHAVIORAL DISTURBANCE, UNSPECIFIED TIMING OF DEMENTIA ONSET: ICD-10-CM

## 2019-04-09 DIAGNOSIS — D50.9 IRON DEFICIENCY ANEMIA, UNSPECIFIED IRON DEFICIENCY ANEMIA TYPE: ICD-10-CM

## 2019-04-09 DIAGNOSIS — I50.9 CONGESTIVE HEART FAILURE, UNSPECIFIED HF CHRONICITY, UNSPECIFIED HEART FAILURE TYPE (H): ICD-10-CM

## 2019-04-09 DIAGNOSIS — F02.80 ALZHEIMER'S DEMENTIA WITHOUT BEHAVIORAL DISTURBANCE, UNSPECIFIED TIMING OF DEMENTIA ONSET: ICD-10-CM

## 2019-04-09 DIAGNOSIS — I25.118 CORONARY ARTERY DISEASE OF NATIVE HEART WITH STABLE ANGINA PECTORIS, UNSPECIFIED VESSEL OR LESION TYPE (H): ICD-10-CM

## 2019-04-09 DIAGNOSIS — K21.9 GASTROESOPHAGEAL REFLUX DISEASE WITHOUT ESOPHAGITIS: Primary | ICD-10-CM

## 2019-04-09 DIAGNOSIS — M51.369 DDD (DEGENERATIVE DISC DISEASE), LUMBAR: Chronic | ICD-10-CM

## 2019-04-09 RX ORDER — ASPIRIN 81 MG
TABLET,CHEWABLE ORAL
Qty: 30 TABLET | Refills: 11 | Status: ON HOLD | OUTPATIENT
Start: 2019-04-09 | End: 2019-01-01

## 2019-04-09 RX ORDER — MEMANTINE HYDROCHLORIDE 5 MG/1
TABLET ORAL
Qty: 30 TABLET | Refills: 98 | Status: ON HOLD | OUTPATIENT
Start: 2019-04-09 | End: 2019-05-12

## 2019-04-09 RX ORDER — LISINOPRIL 20 MG/1
TABLET ORAL
Qty: 30 TABLET | Refills: 98 | Status: SHIPPED | OUTPATIENT
Start: 2019-04-09 | End: 2019-07-23

## 2019-04-09 RX ORDER — DONEPEZIL HYDROCHLORIDE 5 MG/1
TABLET, FILM COATED ORAL
Qty: 30 TABLET | Refills: 98 | Status: SHIPPED | OUTPATIENT
Start: 2019-04-09 | End: 2019-04-24

## 2019-04-09 RX ORDER — PSEUDOEPHED/ACETAMINOPH/DIPHEN 30MG-500MG
TABLET ORAL
Qty: 120 TABLET | Refills: 98 | Status: ON HOLD | OUTPATIENT
Start: 2019-04-09 | End: 2019-06-10

## 2019-04-09 RX ORDER — ASCORBIC ACID 500 MG
TABLET ORAL
Qty: 30 TABLET | Refills: 98 | Status: SHIPPED | OUTPATIENT
Start: 2019-04-09 | End: 2019-05-21

## 2019-04-09 RX ORDER — FUROSEMIDE 40 MG
TABLET ORAL
Qty: 30 TABLET | Refills: 98 | Status: SHIPPED | OUTPATIENT
Start: 2019-04-09 | End: 2019-04-24

## 2019-04-09 RX ORDER — OMEPRAZOLE 40 MG/1
CAPSULE, DELAYED RELEASE ORAL
Qty: 30 CAPSULE | Refills: 98 | Status: SHIPPED | OUTPATIENT
Start: 2019-04-09 | End: 2019-04-24

## 2019-04-09 RX ORDER — FERROUS SULFATE 325(65) MG
TABLET ORAL
Qty: 60 TABLET | Refills: 98 | Status: SHIPPED | OUTPATIENT
Start: 2019-04-09 | End: 2019-05-21

## 2019-04-09 RX ORDER — FUROSEMIDE 20 MG
TABLET ORAL
Qty: 30 TABLET | Refills: 98 | Status: SHIPPED | OUTPATIENT
Start: 2019-04-09 | End: 2019-04-24 | Stop reason: DRUGHIGH

## 2019-04-09 RX ORDER — ATORVASTATIN CALCIUM 40 MG/1
TABLET, FILM COATED ORAL
Qty: 30 TABLET | Refills: 98 | Status: SHIPPED | OUTPATIENT
Start: 2019-04-09 | End: 2020-01-01

## 2019-04-09 RX ORDER — ISOSORBIDE MONONITRATE 30 MG/1
TABLET, EXTENDED RELEASE ORAL
Qty: 30 TABLET | Refills: 98 | Status: ON HOLD | OUTPATIENT
Start: 2019-04-09 | End: 2019-05-12

## 2019-04-09 RX ORDER — ISRADIPINE 5 MG/1
CAPSULE ORAL
Qty: 30 CAPSULE | Refills: 98 | Status: SHIPPED | OUTPATIENT
Start: 2019-04-09 | End: 2019-04-24

## 2019-04-09 RX ORDER — GABAPENTIN 100 MG/1
CAPSULE ORAL
Qty: 90 CAPSULE | Refills: 98 | Status: SHIPPED | OUTPATIENT
Start: 2019-04-09 | End: 2019-04-24

## 2019-04-19 ENCOUNTER — PATIENT OUTREACH (OUTPATIENT)
Dept: GERIATRIC MEDICINE | Facility: CLINIC | Age: 84
End: 2019-04-19

## 2019-04-19 ASSESSMENT — ACTIVITIES OF DAILY LIVING (ADL)
DEPENDENT_IADLS:: CLEANING;COOKING;LAUNDRY;SHOPPING;MEAL PREPARATION;MEDICATION MANAGEMENT;MONEY MANAGEMENT;TRANSPORTATION;INCONTINENCE

## 2019-04-19 NOTE — PROGRESS NOTES
[facility] Care Coordination Contact    [facility] Change in Condition Assessment    Home visit for Change in Condition Health Risk Assessment with Jazmin Clifton completed on Apri 4, 2019    Reason for Early reassessment: Health Status Change  Yes, if yes explain AL reached out to CC and stated they are providing more care for member since assessment in Dec     Type of residence:: Assisted living - [facility] [address] in [place]    Current living arrangement:: I live in assisted living     Assessment completed with:: Patient, Care Team Member    Current Care Plan  Member currently receiving the following home care services: Physicial Therapy, Occupational Therapy was order after TCU discharge and is continuing    Member currently receiving the following community resources:        Medication Review  Medication reconciliation completed in Epic: No - medication managed by NP at AL.    Medication set-up & administration: RN set up weekly.  Assisting Living staff administers medications.  Medication Risk Assessment Medication (1 or more, place referral to MTM): N/A: No risk factors identified  MTM Referral Placed: No: No risk factors idenified    Mental/Behavioral Health   Depression Screening: See assessment on LTCC   Mental health DX:: Yes   Mental health DX how managed:: Medication  Mental Health Diagnosis: Yes: Depression   Mental Health Services: None: No further intervention needed at this time.  CC did discuss [facility] RN coming to visit and member agreed to CC will see if [facility] RN has availability    Falls Assessment:   Fallen 2 or more times in the past year?: Yes   Any fall with injury in the past year?: No    ADL/IADL Dependencies:   Dependent ADLs:: Ambulation-walker, Bathing, Dressing, Grooming, Incontinence, Positioning, Transfers, Wheelchair-with assist, Toileting  Dependent IADLs:: Cleaning, Cooking, Laundry, Shopping, Meal Preparation, Medication Management, Money Management,  Transportation, Incontinence    [facility] Health Plan sponsored benefits: Shared information re: Silver Sneakers/gym memberships, ASA, Calcium +D.    PCA Assessment completed at visit: No     Elderly Waiver Eligibility: Yes-will continue on EW    Care Plan & Recommendations: member stated she is more pain the previously which lead her to call out for AL staff to assist with transfers and positioning josefa at night.  AL staff confirm this but it does not occur every night.  Member also stated that her son is going to be purchasing her an electric wheelchair. CC discussed the process of receiving this thru insurance and member said she does not want to wait that long.      CC prior to visit also talked to NP at AL that stated member pain had been changing so referral was made to the pain clinic to see if this could be a benefit for member at this time.  RN office at AL are aware of this as well and will keep CC informed as needed.      See CC for detailed assessment information.    Follow-Up Plan: Member informed of future contact, plan to f/u with member with a 6 month telephone assessment.  Contact information shared with member and family, encouraged member to call with any questions or concerns at any time.    [place] care continuum providers: Please refer to Health Care Home on the Epic Problem List to view this patient's [facility] Care Plan Summary.    Mirian Weldon MA [facility] Care Coordinator   [phone]

## 2019-04-19 NOTE — PROGRESS NOTES
[facility] Care Coordination Contact    Order placed with [facility] (p: [phone]; f: [phone]) for XL pull ups and Liners .  Order placed on 4/19/19. Access updated.  As required, authorization submitted to health plan.      Amira Carrizales  Case Management Specialist   [facility]   [phone]

## 2019-04-22 ENCOUNTER — PATIENT OUTREACH (OUTPATIENT)
Dept: GERIATRIC MEDICINE | Facility: CLINIC | Age: 84
End: 2019-04-22

## 2019-04-22 NOTE — LETTER
April 22, 2019    Important Plan Information    VICKIE ABAD [place]  [address]  [facility] [address]  Your Care Plan  Dear Vickie,  When we spoke recently, I promised to send you a Care Plan. The plan enclosed is a summary of our discussion. It includes the steps we agreed would help you meet your health goals. In addition, I can help you with:  Shfacxb-A-IxhbVC  This program is available to members who need a ride to medical and dental visits. To schedule a ride, call [phone] or [phone] (toll free). TTY/TTD: 711. You can call Monday - Thursday 8 a.m. to 5 p.m. and Fridays 9 a.m. to 5 p.m.   Handseeing Information   The Handseeing Information program empowers you to improve your health through education and exercise. To learn more, visit Blurb, or call [employer] at [phone] (toll free) (TTY:711) from 7 a.m. - 7 p.m. Central Time, Monday-Friday.  Health Care Directive   This form helps you outline your health care wishes. You can request a form from me and I will answer any questions you have before you discuss it with your doctor.   Annual Physical  Take a key step on your path to good health and set up an annual physical at your clinic.  Questions?  Call me at [phone] Monday-Friday between 8am and 5pm.  TTY/TTD: 711. As we discussed, I plan to be in touch with you again in 6 months to follow up via phone.  Sincerely,    Mirian Weldon MA, LSW    E-mail: [email or web address]  Phone: [phone]      [facility]          cc: member records            American Indians can continue to use [facility] ([facility]) clinics. We will not require prior approval or impose any conditions for you to get services at these clinics. For elders age 65 years and older this includes Elderly Waiver (EW) services accessed through the [place]. If a doctor or other provider in a [facility] or [facility] clinic refers you to a provider in our network, we  will not require you to see your primary care provider prior to the referral.    For accessible formats of this publication or assistance with equal or access to our services, visit [email or web address]/contactmedicaid, or call [phone] (toll free) or use your preferred relay service.    Auxiliary Aids and Services.   [facility] provides auxiliary aids and services, like qualified interpreters or information in accessible formats, free of charge and in a timely manner, to ensure an equal opportunity to participate in our health care programs. Contact [facility] Customer Service at [employer]/contactmedicaid or call [phone] (toll free) or use your preferred relay service.    Language Assistance Services.   [facility] provides translated documents and spoken language interpreting, free of charge and in a timely manner, when language assistance services are necessary to ensure limited English speakers have meaningful access to our information and services. Contact [employer] Service at [employer]/contactmedicaid or call [phone] (toll free) or use your preferred relay service.     Civil Rights Notice  Discrimination is against the law. [facility] does not discriminate on the basis of any of the following:    Race    Color    National Origin    Creed    Baptism    Age    Public Assistance Status    Receipt of Health Care Services    Disability (including physical or mental impairment)    Sex (including sex stereotypes and gender identity)    Marital Status    Political Beliefs    Medical Condition    Genetic Information    Sexual Orientation    Claims Experience    Medical History    Health Status    Civil Rights Complaints.   You have the right to file a discrimination complaint if you believe you were treated in a discriminatory way by [facility]. You may contact any of the following four agencies directly to file a discrimination complaint.    U.S. Department of Health and Human Services  Office for Civil Rights  (OCR)  You have the right to file a complaint with the OCR, a federal agency, if you believe you have been discriminated against because of any of the following:    Race    Disability    Color    Sex (including sex stereotypes and gender identity)    National Origin    Age    Contact the OCR directly to file a complaint:         Director         U.S. Department of Health and Human Services  Office for Civil Rights         [address] [place] [address] [address] [address]         [phone] (Voice)         [phone] (TDD)         Complaint Portal - [email or web address]     [place] Department of Human Rights (MDHR)  In [place], you have the right to file a complaint with the MD if you believe you have been discriminated against because of any of the following:      Race    Color    National Origin    Congregation    Creed    Sex    Sexual Orientation    Marital Status    Public Assistance Status    Contact the [facility] directly to file a complaint:         [place] Department of Human Rights         [address] [address]         [phone] (voice)          [phone] (toll free)         711 or [phone] ([place] Relay)         [phone] (Fax)         [email or web address] (Email)     [place] Department of Human Services (DHS)  You have the right to file a complaint with [facility] if you believe you have been discriminated against in our health care programs because of any of the following:    Race    Color    National Origin    Creed    Congregation    Age    Public Assistance Status    Receipt of Health Care Services    Disability (including physical or mental impairment)    Sex (including sex stereotypes and gender identity)    Marital Status    Political Beliefs    Medical Condition    Genetic Information    Sexual Orientation    Claims Experience    Medical History    Health Status    Complaints  must be in writing and filed within 180 days of the date you discovered the alleged discrimination. The complaint must contain your name and address and describe the discrimination you are complaining about. After we get your complaint, we will review it and notify you in writing about whether we have authority to investigate. If we do, we will investigate the complaint.      [facility] will notify you in writing of the investigation s outcome. You have a right to appeal the outcome if you disagree with the decision. To appeal, you must send a written request to have [facility] review the investigation outcome period. Be brief and state why you disagree with the decision. Include additional information you think is important.      If you file a complaint in this way, the people who work for the agency named in the complaint cannot retaliate against you. This means they cannot punish you in any way for filing a complaint. Filing a complaint in this way does not stop you from seeking out other legal or administration actions.     Contact [facility] directly to file a discrimination complaint:        ATTN: Civil Rights Coordinator        [place] Department of Human Services        Equal Opportunity and Access Division        [address] [address]        [place] [address] [phone] (voice) or use your preferred relay service     [facility] Complaint Notice   Contact [facility] directly to file a discrimination complaint:  [facility] Civil Rights Coordinator  Mail Route   [address]  [place] [address] [phone] (voice) or use your preferred relay service  [email or web address]

## 2019-04-22 NOTE — PROGRESS NOTES
[facility] Care Coordination Contact    Received after visit chart from care coordinator.  Completed following tasks: Mailed copy of care plan to client, Updated services in access, Submitted referrals/auths for Yessy on [facility] and Entered MMIS  Chart was returned to CC.   , Mailed copy of CL tool to member, faxed copy to AL facility, uploaded into [employer] and submitted authorization to health plan.   and Provider Signature - Summary:  Member indicates that they would like a summary of their POC shared with the following EW providers:  Yessy on [facility].  Letter faxed to providers for signature.      Amira Carrizales  Case Management Specialist   [facility]   [phone]

## 2019-04-24 ENCOUNTER — ASSISTED LIVING VISIT (OUTPATIENT)
Dept: GERIATRICS | Facility: CLINIC | Age: 84
End: 2019-04-24
Payer: COMMERCIAL

## 2019-04-24 VITALS
BODY MASS INDEX: 35.25 KG/M2 | OXYGEN SATURATION: 95 % | DIASTOLIC BLOOD PRESSURE: 77 MMHG | SYSTOLIC BLOOD PRESSURE: 175 MMHG | HEART RATE: 83 BPM | WEIGHT: 199 LBS | RESPIRATION RATE: 20 BRPM | TEMPERATURE: 97.9 F

## 2019-04-24 DIAGNOSIS — M48.061 SPINAL STENOSIS OF LUMBAR REGION, UNSPECIFIED WHETHER NEUROGENIC CLAUDICATION PRESENT: ICD-10-CM

## 2019-04-24 DIAGNOSIS — M25.512 ACUTE PAIN OF LEFT SHOULDER: ICD-10-CM

## 2019-04-24 DIAGNOSIS — R19.09 RIGHT GROIN MASS: ICD-10-CM

## 2019-04-24 DIAGNOSIS — F41.8 DEPRESSION WITH ANXIETY: ICD-10-CM

## 2019-04-24 DIAGNOSIS — I10 ESSENTIAL HYPERTENSION: ICD-10-CM

## 2019-04-24 DIAGNOSIS — I50.9 CONGESTIVE HEART FAILURE, UNSPECIFIED HF CHRONICITY, UNSPECIFIED HEART FAILURE TYPE (H): ICD-10-CM

## 2019-04-24 DIAGNOSIS — M51.369 DDD (DEGENERATIVE DISC DISEASE), LUMBAR: Primary | ICD-10-CM

## 2019-04-24 DIAGNOSIS — G89.4 CHRONIC PAIN SYNDROME: ICD-10-CM

## 2019-04-24 DIAGNOSIS — R60.0 BILATERAL LEG EDEMA: ICD-10-CM

## 2019-04-24 RX ORDER — FUROSEMIDE 40 MG
40 TABLET ORAL 2 TIMES DAILY
Qty: 60 TABLET | Refills: 11 | Status: ON HOLD | OUTPATIENT
Start: 2019-04-24 | End: 2019-01-01

## 2019-04-24 RX ORDER — DULOXETIN HYDROCHLORIDE 30 MG/1
CAPSULE, DELAYED RELEASE ORAL
Qty: 90 CAPSULE | Refills: 11 | Status: ON HOLD | OUTPATIENT
Start: 2019-04-24 | End: 2019-01-01

## 2019-04-24 RX ORDER — LIDOCAINE 4 G/G
1 PATCH TOPICAL DAILY
COMMUNITY
End: 2019-01-01

## 2019-04-24 RX ORDER — GABAPENTIN 100 MG/1
200 CAPSULE ORAL 2 TIMES DAILY
Qty: 120 CAPSULE | Refills: 11 | Status: SHIPPED | OUTPATIENT
Start: 2019-04-24 | End: 2019-01-01 | Stop reason: DRUGHIGH

## 2019-04-24 NOTE — LETTER
"    4/24/2019        RE: Jazmin Krishnamurthy [place]  [address]  [facility] [address]        [facility]  [place] Medical Record Number:  [id number]  Place of Service where encounter took place:  [facility] [[id number]]  Chief Complaint   Patient presents with     RECHECK       HPI:    Jazmin Clifton  is a 86 year old (12/30/1932), who is being seen today for an episodic care visit.  HPI information obtained from: facility chart records, facility staff, patient report and [place] Epic chart review. Today's concern is:     Congestive heart failure, unspecified HF chronicity, unspecified heart failure type (H)  Essential hypertension  Bilateral leg edema  DDD (degenerative disc disease), lumbar  Spinal stenosis of lumbar region, unspecified whether neurogenic claudication present  Chronic pain syndrome  Depression with anxiety  Right groin mass  Acute pain of left shoulder   Patient seen today to f/u on pain, HTN. She reports that she continues to pain in her right hip. She thinks that it is caused by her seroma. She has not worn compression to her seroma as recommended since her last visit. She had at that time been planning on buying new compression tights, but has not done so.  States she cannot tolerate spanks, has purchased alternative undergarments, but they are too loose and do not provide compression. Lymph edema therapist was present on exam and she requested orders to provider massage to the seroma. She also states she would help patient look into garments that would provide compression. She does report she feels slightly more bloated since starting her lymphedema therapy.     Discussed that pain in her right hip likely d/t her spinal stenosis with history of multiple back surgeries. Previous right hip x-ray negative. At previous neurosurgery apt she was told she has \"chronic pain\" and was recommended to go to a pain clinic. She also " "continues to have pain in her left shoulder, which she has had since another patient \"fell into her\" on an elevator a 3-4 months ago. Has had multiple x-rays of right shoulder and arm, all are negative.  She reports pain with lifting and limited range of motion. Discussed more advanced imaging such as CT scan, but given that she has made it clear that she is not interested in pursuing any further surgical interventions do not feel that further imaging would be beneficial at this time.  She continues to work with therapy, does not think they help as they only make her move her arm and try to walk. She continues to walk less and less  - has requested electric wheelchair and is working with her care coordinator on this.     She does report she feels anxious and depressed at times as she is \"tired of being sick and in pain.\" Denies any burning or dysuria. Denies any constipation or loose stools. Reports black stools but she is on a iron supplement.       Past Medical and Surgical History reviewed in Epic today.    MEDICATIONS:  Current Outpatient Medications   Medication Sig Dispense Refill     ACETAMINOPHEN EXTRA STRENGTH 500 MG tablet TAKE TWO TABLETS (1000MG) BY MOUTH THREE TIMES DAILY 120 tablet 98     Alendronate Sodium (FOSAMAX PO) Take 70 mg by mouth once a week Take 1 tab once weekly (Thursday) on an empty stomach with full glass of water. Remain upright for 30 minutes. Wait 30 minutes before eating       ASPIRIN LOW DOSE 81 MG chewable tablet CHEW AND SWALLOW ONE TABLET BY MOUTH ONCE DAILY 30 tablet 11     atorvastatin (LIPITOR) 40 MG tablet TAKE 1 TABLET BY MOUTH ONCE DAILY 30 tablet 98     Blood Glucose Monitoring Suppl CORY 2 times daily Call nurse for further directions if blood glucose is less than 80 or greater than 250       Calcium Carb-Cholecalciferol (CALCIUM + D3) 600-200 MG-UNIT TABS TAKE 1 TABLET BY MOUTH ONCE DAILY 30 tablet 98     calcium carbonate (TUMS) 500 MG chewable tablet Take 1 chew tab by " mouth every hour as needed for heartburn       DONEPEZIL HCL PO Take 5 mg by mouth daily       DULoxetine (CYMBALTA) 30 MG capsule Take 2 capsules (60 mg) by mouth daily AND 1 capsule (30 mg) At Bedtime. 90 capsule 11     FEROSUL 325 (65 Fe) MG tablet TAKE 1 TABLET BY MOUTH TWICE DAILY 60 tablet 98     fluticasone-vilanterol (BREO ELLIPTA) 100-25 MCG/INH oral inhaler Inhale 1 puff into the lungs daily       furosemide (LASIX) 40 MG tablet Take 1 tablet (40 mg) by mouth 2 times daily 60 tablet 11     gabapentin (NEURONTIN) 100 MG capsule Take 2 capsules (200 mg) by mouth 2 times daily 120 capsule 11     hypromellose-dextran (ARTIFICAL TEARS) 0.1-0.3 % ophthalmic solution Place 1 drop into both eyes every 6 hours as needed for dry eyes       isosorbide mononitrate (IMDUR) 30 MG 24 hr tablet TAKE 1 TABLET BY MOUTH ONCE DAILY 30 tablet 98     levalbuterol (XOPENEX) 1.25 MG/3ML neb solution Take 1 ampule by nebulization every 4 hours as needed for shortness of breath / dyspnea or wheezing And every 4 hours PRN       Lidocaine (LIDOCARE) 4 % Patch Place 1 patch onto the skin every 24 hours Apply 1 patch shoulder or hip as needed       lisinopril (PRINIVIL/ZESTRIL) 20 MG tablet TAKE 1 TABLET BY MOUTH ONCE DAILY 30 tablet 98     MAGNESIUM OXIDE PO Take 250 mg by mouth daily       melatonin 3 MG CAPS Take 3 mg by mouth At Bedtime       memantine (NAMENDA) 5 MG tablet TAKE 1 TABLET BY MOUTH ONCE DAILY 30 tablet 98     Menthol, Topical Analgesic, (BIOFREEZE) 4 % GEL Externally apply topically 4 times daily as needed Apply topically to right hip and buttock 4 times per day. 8am, 2pm, 4pm, and 8pm       nystatin (MYCOSTATIN) 438336 UNIT/GM POWD Apply topically 2 times daily as needed        OMEPRAZOLE PO Take 40 mg by mouth every morning       oxyCODONE HCl (OXAYDO) 5 MG TABA Take 5 mg by mouth 3 times daily - Take 5 mg 2 times per day as needed       senna-docusate (SENOKOT-S/PERICOLACE) 8.6-50 MG tablet Take 1 tablet by mouth  2 times daily as needed        tiZANidine (ZANAFLEX) 2 MG tablet Take 2 mg by mouth 3 times daily as needed for muscle spasms        vitamin C (ASCORBIC ACID) 500 MG tablet TAKE 1 TABLET BY MOUTH ONCE DAILY 30 tablet 98         REVIEW OF SYSTEMS:  4 point ROS including Respiratory, CV, GI and , other than that noted in the HPI,  is negative    Objective:  /77   Pulse 83   Temp 97.9  F (36.6  C)   Resp 20   Wt 90.3 kg (199 lb)   SpO2 95%   BMI 35.25 kg/m     Exam:  GENERAL APPEARANCE:  Alert, oriented, cooperative  RESP:  respiratory effort and palpation of chest normal, lungs clear to auscultation , no respiratory distress  CV:  Palpation and auscultation of heart done , regular rate and rhythm, no murmur, rub, or gallop, peripheral edema 2+ in BLE  ABDOMEN:  bowel sounds normal, soft, non-tender  M/S:   ambulation not seen, posterior and anterior tenderness to left shoulder, right groin mass palpable, tender similar in size to previous exam  SKIN:  Palpation of skin and subcutaneous tissue large right groin seroma  NEURO:   Cranial nerves 2-12 are normal tested and grossly at patient's baseline  PSYCH:  oriented X 3, affect and mood normal, anxious at times, memory fair, judgement intact    Labs:   Recent labs in [facility] reviewed by me today.     ASSESSMENT/PLAN:  (M51.36) DDD (degenerative disc disease), lumbar  (primary encounter diagnosis)  (M48.061) Spinal stenosis of lumbar region, unspecified whether neurogenic claudication present  (M25.512) Acute pain of left shoulder  (G89.4) Chronic pain syndrome  Comment: Continues to have significant pain. Suspect anxiety and depression contributing. Will hold off on further imaging of shoulder as doubt would be beneficial as she does not want any further surgical interventions. Suspect injury is muscular;   Plan: Duloxetine - will increased to 60mg q AM and 30mg at bedtime, will increased gabapentin to 200mg BID.  continue oxycodone 5mg TID and BID PRN -  concerned that higher doses may cause increased side effects and falls, tizanidine 2mg TID PRN, APAP 1000mg TID, head PRN, lidocaine patches to right hip; change biofreeze to PRN per patient request; continue PT/OT. Has appointment to establish care with pain clinic on 5/14; has MTM referral on 4/30.       (I50.9) Congestive heart failure, unspecified HF chronicity, unspecified heart failure type (H)  (I10) Essential hypertension  (R60.0) Bilateral leg edema  Comment: Echo 2/19 showed EF of 55-60%, mild LVH, grade II diastolic dysfunction. LE edema improved since starting lymph wraps. BP elevated again today, suspect pain and anxiety contributing.   Plan: Will increase lasix to 40mg BID, check BMP in 2 weeks to monitor for stability, daily weights, ASA, lipitor, imdur, lisinopril, continue to monitor and adjust     (F41.8) Depression with anxiety  Comment: Suspect affecting pain, HTN,   Plan: Increased duloxetine to 60mg q AM and 30mg at bedtime, continue to monitor and adjust     (R19.09) Right groin mass - seroma  Comment: previously evaluated by surgery - felt to be poor candidate for drainage as would likely re-occur. Declined referral to different surgeon for second opinion. Is non-compliant with compression despite attempting multiple garments and patient education  Plan: Lymphedema - ok to massage area, will assist patient on finding compression garments.       transcribed by : Aliyah Milan  Orders:  1. Change Biofreeze to QID PRN to left sholder, right hip.  2. Increase Duloxetine to 60 mg PO every AM, 30 mg PO at bedtime - Dx: depression, anxiety  3. Discontinue current gabapentin orders- increase gabapentin to 200 mg PO BID - notify provider of any increased sedation or confusion.  4. Discontinue current Lasix orders - start Lasix 40 mg PO BID - Dx: CHF  5. BMP, Hfb in 2 weeks on lab day - Dx: CHF      Electronically signed by:  MARY Gómez CNP       Estimated Creatinine  Clearance: 37.2 mL/min (A) (based on SCr of 1.16 mg/dL (H)).          Sincerely,        MARY Gómez CNP

## 2019-04-24 NOTE — PROGRESS NOTES
"[facility]  [place] Medical Record Number:  [id number]  Place of Service where encounter took place:  [facility] [[id number]]  Chief Complaint   Patient presents with     RECHECK       HPI:    Jazmin Clifton  is a 86 year old (12/30/1932), who is being seen today for an episodic care visit.  HPI information obtained from: facility chart records, facility staff, patient report and [facility] chart review. Today's concern is:     Congestive heart failure, unspecified HF chronicity, unspecified heart failure type (H)  Essential hypertension  Bilateral leg edema  DDD (degenerative disc disease), lumbar  Spinal stenosis of lumbar region, unspecified whether neurogenic claudication present  Chronic pain syndrome  Depression with anxiety  Right groin mass  Acute pain of left shoulder   Patient seen today to f/u on pain, HTN. She reports that she continues to pain in her right hip. She thinks that it is caused by her seroma. She has not worn compression to her seroma as recommended since her last visit. She had at that time been planning on buying new compression tights, but has not done so.  States she cannot tolerate spanks, has purchased alternative undergarments, but they are too loose and do not provide compression. Lymph edema therapist was present on exam and she requested orders to provider massage to the seroma. She also states she would help patient look into garments that would provide compression. She does report she feels slightly more bloated since starting her lymphedema therapy.     Discussed that pain in her right hip likely d/t her spinal stenosis with history of multiple back surgeries. Previous right hip x-ray negative. At previous neurosurgery apt she was told she has \"chronic pain\" and was recommended to go to a pain clinic. She also continues to have pain in her left shoulder, which she has had since another patient \"fell into her\" on an elevator " "a 3-4 months ago. Has had multiple x-rays of right shoulder and arm, all are negative.  She reports pain with lifting and limited range of motion. Discussed more advanced imaging such as CT scan, but given that she has made it clear that she is not interested in pursuing any further surgical interventions do not feel that further imaging would be beneficial at this time.  She continues to work with therapy, does not think they help as they only make her move her arm and try to walk. She continues to walk less and less  - has requested electric wheelchair and is working with her care coordinator on this.     She does report she feels anxious and depressed at times as she is \"tired of being sick and in pain.\" Denies any burning or dysuria. Denies any constipation or loose stools. Reports black stools but she is on a iron supplement.       Past Medical and Surgical History reviewed in Epic today.    MEDICATIONS:  Current Outpatient Medications   Medication Sig Dispense Refill     ACETAMINOPHEN EXTRA STRENGTH 500 MG tablet TAKE TWO TABLETS (1000MG) BY MOUTH THREE TIMES DAILY 120 tablet 98     Alendronate Sodium (FOSAMAX PO) Take 70 mg by mouth once a week Take 1 tab once weekly (Thursday) on an empty stomach with full glass of water. Remain upright for 30 minutes. Wait 30 minutes before eating       ASPIRIN LOW DOSE 81 MG chewable tablet CHEW AND SWALLOW ONE TABLET BY MOUTH ONCE DAILY 30 tablet 11     atorvastatin (LIPITOR) 40 MG tablet TAKE 1 TABLET BY MOUTH ONCE DAILY 30 tablet 98     Blood Glucose Monitoring Suppl CORY 2 times daily Call nurse for further directions if blood glucose is less than 80 or greater than 250       Calcium Carb-Cholecalciferol (CALCIUM + D3) 600-200 MG-UNIT TABS TAKE 1 TABLET BY MOUTH ONCE DAILY 30 tablet 98     calcium carbonate (TUMS) 500 MG chewable tablet Take 1 chew tab by mouth every hour as needed for heartburn       DONEPEZIL HCL PO Take 5 mg by mouth daily       DULoxetine " (CYMBALTA) 30 MG capsule Take 2 capsules (60 mg) by mouth daily AND 1 capsule (30 mg) At Bedtime. 90 capsule 11     FEROSUL 325 (65 Fe) MG tablet TAKE 1 TABLET BY MOUTH TWICE DAILY 60 tablet 98     fluticasone-vilanterol (BREO ELLIPTA) 100-25 MCG/INH oral inhaler Inhale 1 puff into the lungs daily       furosemide (LASIX) 40 MG tablet Take 1 tablet (40 mg) by mouth 2 times daily 60 tablet 11     gabapentin (NEURONTIN) 100 MG capsule Take 2 capsules (200 mg) by mouth 2 times daily 120 capsule 11     hypromellose-dextran (ARTIFICAL TEARS) 0.1-0.3 % ophthalmic solution Place 1 drop into both eyes every 6 hours as needed for dry eyes       isosorbide mononitrate (IMDUR) 30 MG 24 hr tablet TAKE 1 TABLET BY MOUTH ONCE DAILY 30 tablet 98     levalbuterol (XOPENEX) 1.25 MG/3ML neb solution Take 1 ampule by nebulization every 4 hours as needed for shortness of breath / dyspnea or wheezing And every 4 hours PRN       Lidocaine (LIDOCARE) 4 % Patch Place 1 patch onto the skin every 24 hours Apply 1 patch shoulder or hip as needed       lisinopril (PRINIVIL/ZESTRIL) 20 MG tablet TAKE 1 TABLET BY MOUTH ONCE DAILY 30 tablet 98     MAGNESIUM OXIDE PO Take 250 mg by mouth daily       melatonin 3 MG CAPS Take 3 mg by mouth At Bedtime       memantine (NAMENDA) 5 MG tablet TAKE 1 TABLET BY MOUTH ONCE DAILY 30 tablet 98     Menthol, Topical Analgesic, (BIOFREEZE) 4 % GEL Externally apply topically 4 times daily as needed Apply topically to right hip and buttock 4 times per day. 8am, 2pm, 4pm, and 8pm       nystatin (MYCOSTATIN) 798607 UNIT/GM POWD Apply topically 2 times daily as needed        OMEPRAZOLE PO Take 40 mg by mouth every morning       oxyCODONE HCl (OXAYDO) 5 MG TABA Take 5 mg by mouth 3 times daily - Take 5 mg 2 times per day as needed       senna-docusate (SENOKOT-S/PERICOLACE) 8.6-50 MG tablet Take 1 tablet by mouth 2 times daily as needed        tiZANidine (ZANAFLEX) 2 MG tablet Take 2 mg by mouth 3 times daily as  needed for muscle spasms        vitamin C (ASCORBIC ACID) 500 MG tablet TAKE 1 TABLET BY MOUTH ONCE DAILY 30 tablet 98         REVIEW OF SYSTEMS:  4 point ROS including Respiratory, CV, GI and , other than that noted in the HPI,  is negative    Objective:  /77   Pulse 83   Temp 97.9  F (36.6  C)   Resp 20   Wt 90.3 kg (199 lb)   SpO2 95%   BMI 35.25 kg/m    Exam:  GENERAL APPEARANCE:  Alert, oriented, cooperative  RESP:  respiratory effort and palpation of chest normal, lungs clear to auscultation , no respiratory distress  CV:  Palpation and auscultation of heart done , regular rate and rhythm, no murmur, rub, or gallop, peripheral edema 2+ in BLE  ABDOMEN:  bowel sounds normal, soft, non-tender  M/S:   ambulation not seen, posterior and anterior tenderness to left shoulder, right groin mass palpable, tender similar in size to previous exam  SKIN:  Palpation of skin and subcutaneous tissue large right groin seroma  NEURO:   Cranial nerves 2-12 are normal tested and grossly at patient's baseline  PSYCH:  oriented X 3, affect and mood normal, anxious at times, memory fair, judgement intact    Labs:   Recent labs in [facility] reviewed by me today.     ASSESSMENT/PLAN:  (M51.36) DDD (degenerative disc disease), lumbar  (primary encounter diagnosis)  (M48.061) Spinal stenosis of lumbar region, unspecified whether neurogenic claudication present  (M25.512) Acute pain of left shoulder  (G89.4) Chronic pain syndrome  Comment: Continues to have significant pain. Suspect anxiety and depression contributing. Will hold off on further imaging of shoulder as doubt would be beneficial as she does not want any further surgical interventions. Suspect injury is muscular;   Plan: Duloxetine - will increased to 60mg q AM and 30mg at bedtime, will increased gabapentin to 200mg BID.  continue oxycodone 5mg TID and BID PRN - concerned that higher doses may cause increased side effects and falls, tizanidine 2mg TID PRN, APAP  1000mg TID, head PRN, lidocaine patches to right hip; change biofreeze to PRN per patient request; continue PT/OT. Has appointment to establish care with pain clinic on 5/14; has MTM referral on 4/30.       (I50.9) Congestive heart failure, unspecified HF chronicity, unspecified heart failure type (H)  (I10) Essential hypertension  (R60.0) Bilateral leg edema  Comment: Echo 2/19 showed EF of 55-60%, mild LVH, grade II diastolic dysfunction. LE edema improved since starting lymph wraps. BP elevated again today, suspect pain and anxiety contributing.   Plan: Will increase lasix to 40mg BID, check BMP in 2 weeks to monitor for stability, daily weights, ASA, lipitor, imdur, lisinopril, continue to monitor and adjust     (F41.8) Depression with anxiety  Comment: Suspect affecting pain, HTN,   Plan: Increased duloxetine to 60mg q AM and 30mg at bedtime, continue to monitor and adjust     (R19.09) Right groin mass - seroma  Comment: previously evaluated by surgery - felt to be poor candidate for drainage as would likely re-occur. Declined referral to different surgeon for second opinion. Is non-compliant with compression despite attempting multiple garments and patient education  Plan: Lymphedema - ok to massage area, will assist patient on finding compression garments.       transcribed by : Aliyah Milan  Orders:  1. Change Biofreeze to QID PRN to left sholder, right hip.  2. Increase Duloxetine to 60 mg PO every AM, 30 mg PO at bedtime - Dx: depression, anxiety  3. Discontinue current gabapentin orders- increase gabapentin to 200 mg PO BID - notify provider of any increased sedation or confusion.  4. Discontinue current Lasix orders - start Lasix 40 mg PO BID - Dx: CHF  5. BMP, Hfb in 2 weeks on lab day - Dx: CHF      Electronically signed by:  MARY Gómez CNP       Estimated Creatinine Clearance: 37.2 mL/min (A) (based on SCr of 1.16 mg/dL (H)).

## 2019-04-29 DIAGNOSIS — R21 RASH: Primary | ICD-10-CM

## 2019-04-29 RX ORDER — NYSTATIN 100000 [USP'U]/G
POWDER TOPICAL
Qty: 60 G | Refills: 97 | Status: ON HOLD | OUTPATIENT
Start: 2019-04-29 | End: 2019-01-01

## 2019-05-02 ENCOUNTER — HOSPITAL LABORATORY (OUTPATIENT)
Facility: OTHER | Age: 84
End: 2019-05-02

## 2019-05-02 LAB
ANION GAP SERPL CALCULATED.3IONS-SCNC: 6 MMOL/L (ref 3–14)
BUN SERPL-MCNC: 32 MG/DL (ref 7–30)
CALCIUM SERPL-MCNC: 9.5 MG/DL (ref 8.5–10.1)
CHLORIDE SERPL-SCNC: 105 MMOL/L (ref 94–109)
CO2 SERPL-SCNC: 28 MMOL/L (ref 20–32)
CREAT SERPL-MCNC: 1.2 MG/DL (ref 0.52–1.04)
GFR SERPL CREATININE-BSD FRML MDRD: 41 ML/MIN/{1.73_M2}
GLUCOSE SERPL-MCNC: 171 MG/DL (ref 70–99)
HGB BLD-MCNC: 11.9 G/DL (ref 11.7–15.7)
POTASSIUM SERPL-SCNC: 3.5 MMOL/L (ref 3.4–5.3)
SODIUM SERPL-SCNC: 139 MMOL/L (ref 133–144)

## 2019-05-02 NOTE — PROGRESS NOTES
[facility] Care Coordination Contact    Per APA item delivered, CC notified    Jazmin Clifton 128/30/1932 XL pull ups and liners 4/19/2019 Delivered 4/29/19     Amira Carrizales  Case Management Specialist   [facility]   [phone]

## 2019-05-06 NOTE — PROGRESS NOTES
"[facility]  [facility] Record Number:  [id number]  Place of Service where encounter took place:  [facility] [[id number]]  Chief Complaint   Patient presents with     RECHECK       HPI:    Jazmin Clifton  is a 86 year old (12/30/1932), who is being seen today for an episodic care visit.  HPI information obtained from: facility chart records, facility staff, patient report and [facility] Epic chart review. Today's concern is:     Congestion of paranasal sinus  Chronic pain syndrome  DDD (degenerative disc disease), lumbar  Chronic left shoulder pain   Patient seen today per her request as she reports congestion in her right ear. States ear starting feeling \"plugged\" a few days ago, feels like she is \"hearing through a fish bowl.\" Had a sore throat over the weekend, which is now mostly resolved reports she also had a lot phlegm in her throat, and woke up \"choking\" but this is also improving. Does have some mild sinus tenderness on the right side of her face. Has been eating without issues, no difficulty chewing or swallowing. Is afebrile. No headache. No cough or SOB.     Discussed pain with her today. She appears to have increased movement in her left shoulder. States it is stiff in the morning, but she does some exercises with it, and that does help. Does get some pain that radiates up into her neck with stretches it. Discussed imaging beyond x-ray, but she states she would not want any further surgeries so question usefulness of imaging as likely would not change treatment - has already done PT. She was agreeable to this.     She does report more pain in her hips and having a harder time walking. Home PT has signed off as she was no longer improving and only had limited participation. Discussed importance of physical activity, and that moving may actually help with some of her joint pain as she reports she feels stiff and frustrated by her limited mobility. Was " agreeable to try exercise classes at the facility. She has been taking her oxycodone TID as scheduled along with her PRN tizanidine together, she does feel this combination is helpful. Staff report she does complain of feeling sleepy during the day. No recent falls. Has apt to establish care with pain clinic next week.     Past Medical and Surgical History reviewed in Epic today.    MEDICATIONS:  Current Outpatient Medications   Medication Sig Dispense Refill     ACETAMINOPHEN EXTRA STRENGTH 500 MG tablet TAKE TWO TABLETS (1000MG) BY MOUTH THREE TIMES DAILY 120 tablet 98     Alendronate Sodium (FOSAMAX PO) Take 70 mg by mouth once a week Take 1 tab once weekly (Thursday) on an empty stomach with full glass of water. Remain upright for 30 minutes. Wait 30 minutes before eating       ASPIRIN LOW DOSE 81 MG chewable tablet CHEW AND SWALLOW ONE TABLET BY MOUTH ONCE DAILY 30 tablet 11     atorvastatin (LIPITOR) 40 MG tablet TAKE 1 TABLET BY MOUTH ONCE DAILY 30 tablet 98     Blood Glucose Monitoring Suppl CORY 2 times daily Call nurse for further directions if blood glucose is less than 80 or greater than 250       calcium carbonate (TUMS) 500 MG chewable tablet Take 1 chew tab by mouth every hour as needed for heartburn       DONEPEZIL HCL PO Take 5 mg by mouth daily       DULoxetine (CYMBALTA) 30 MG capsule Take 2 capsules (60 mg) by mouth daily AND 1 capsule (30 mg) At Bedtime. 90 capsule 11     FEROSUL 325 (65 Fe) MG tablet TAKE 1 TABLET BY MOUTH TWICE DAILY 60 tablet 98     fluticasone-vilanterol (BREO ELLIPTA) 100-25 MCG/INH oral inhaler Inhale 1 puff into the lungs daily       furosemide (LASIX) 40 MG tablet Take 40 mg by mouth every morning Give at 8:00 am and and 2:00 pm       gabapentin (NEURONTIN) 100 MG capsule Take 2 capsules (200 mg) by mouth 2 times daily 120 capsule 11     guaiFENesin (MUCINEX) 600 MG 12 hr tablet Take 1 tablet (600 mg) by mouth 2 times daily for 10 days 20 tablet 0     isosorbide  mononitrate (IMDUR) 30 MG 24 hr tablet TAKE 1 TABLET BY MOUTH ONCE DAILY 30 tablet 98     isradipine (DYNACIRC) 5 MG capsule Take 5 mg by mouth 2 times daily       levalbuterol (XOPENEX) 1.25 MG/3ML neb solution Take 1 ampule by nebulization every 4 hours as needed for shortness of breath / dyspnea or wheezing And every 4 hours PRN       Lidocaine (LIDOCARE) 4 % Patch Place 1 patch onto the skin daily as needed To desired area (shoulder or hip). On for 12hrs, off for 12hrs       lisinopril (PRINIVIL/ZESTRIL) 20 MG tablet TAKE 1 TABLET BY MOUTH ONCE DAILY 30 tablet 98     MAGNESIUM OXIDE PO Take 250 mg by mouth daily       memantine (NAMENDA) 5 MG tablet TAKE 1 TABLET BY MOUTH ONCE DAILY 30 tablet 98     Menthol, Topical Analgesic, (BIOFREEZE) 4 % GEL Externally apply topically 4 times daily as needed To left shoulder and right hip       nystatin (MYCOSTATIN) 215162 UNIT/GM external powder APPLY TOPICALLY TO ABDOMEN FOLDS, GROIN, AND BREASTS TWICE DAILY AS NEEDED 60 g 97     OMEPRAZOLE PO Take 40 mg by mouth every morning       oxyCODONE HCl (OXAYDO) 5 MG TABA Take 5 mg by mouth 3 times daily And 1 tablet 2 times a day as needed       senna-docusate (SENOKOT-S/PERICOLACE) 8.6-50 MG tablet Take 1 tablet by mouth 2 times daily as needed        tiZANidine (ZANAFLEX) 2 MG tablet Take 2 mg by mouth 3 times daily as needed for muscle spasms        vitamin C (ASCORBIC ACID) 500 MG tablet TAKE 1 TABLET BY MOUTH ONCE DAILY 30 tablet 98     Calcium Carb-Cholecalciferol (CALCIUM + D3) 600-200 MG-UNIT TABS TAKE 1 TABLET BY MOUTH ONCE DAILY 30 tablet 98     furosemide (LASIX) 40 MG tablet Take 1 tablet (40 mg) by mouth 2 times daily 60 tablet 11     hypromellose-dextran (ARTIFICAL TEARS) 0.1-0.3 % ophthalmic solution Place 1 drop into both eyes every 6 hours as needed for dry eyes       melatonin 3 MG CAPS Take 3 mg by mouth At Bedtime           REVIEW OF SYSTEMS:  4 point ROS including Respiratory, CV, GI and , other than that  noted in the HPI,  is negative    Objective:  /78   Pulse 73   Temp 98.6  F (37  C)   Resp 24   Wt 88 kg (194 lb)   SpO2 95%   BMI 34.37 kg/m    Exam:  GENERAL APPEARANCE:  Alert, in no distress, cooperative  ENT:  oral mucous membranes moist, frontal sinus tenderness external ear WNL, no pain to touch or ear movement  RESP:  respiratory effort and palpation of chest normal, lungs clear to auscultation , no respiratory distress  CV:  Palpation and auscultation of heart done , regular rate and rhythm, no murmur, rub, or gallop, peripheral edema 2+ in BLE, (lymph wraps in place)  ABDOMEN:  no guarding or rebound, bowel sounds normal  M/S:   limited ambulated, decreased but improved ROM to left shoulder, decreased ROM to bialt hip, BLE strenght 4/5  SKIN:  Inspection of skin and subcutaneous tissue baseline  NEURO:   Cranial nerves 2-12 are normal tested and grossly at patient's baseline  PSYCH:  oriented X 3, normal insight, judgement and memory, affect and mood normal    Labs:   Recent labs in [facility] reviewed by me today.     ASSESSMENT/PLAN:  (R09.81) Congestion of paranasal sinus  (primary encounter diagnosis)  Comment: Suspect virus as she is afebrile, only mild tenderness, no ear pain; symptoms only lasting a few days  Plan: Mucinex 600mg BID x 10 days, will avoid sudafed as likely to cause HTN, supportives cares, continue to monitor    (G89.4) Chronic pain syndrome  (M51.36) DDD (degenerative disc disease), lumbar  (M25.512,  G89.29) Chronic left shoulder pain  Comment: H/o of 7 previous spinal surgeries. Continues with chronic low back pain and right hip pain; shoulder pain going on >4 months after another resident fell into her on elevator. Multiple x-rays or arm negative, suspect muscle injury, though given ongoing symptoms could be rotator cuff injury or ligament injury. However patient  Has been adamant about not want more surgeries. Some improvement with therapy and exercise. Appears to have more  use of arm when not conscious of arm movement.    Plan: Duloxetine  60mg q AM and 30mg at bedtime, gabapentin to 200mg BID.  continue oxycodone 5mg TID and BID PRN - concerned that higher doses may cause increased side effects and falls, tizanidine 2mg TID PRN, APAP 1000mg TID, lidocaine patches to right hip; change biofreeze to PRN per patient request; continue PT/OT. Has appointment to establish care with pain clinic on 5/14;     transcribed by : Aliyah Milan  Orders:  1. Mucinex 600 mg PO every 12 hours x 10 days - Dx: nasal congestion      Electronically signed by:  MARY Gómez CNP

## 2019-05-07 ENCOUNTER — NURSING HOME VISIT (OUTPATIENT)
Dept: GERIATRICS | Facility: CLINIC | Age: 84
End: 2019-05-07
Payer: COMMERCIAL

## 2019-05-07 VITALS
WEIGHT: 194 LBS | RESPIRATION RATE: 24 BRPM | SYSTOLIC BLOOD PRESSURE: 145 MMHG | OXYGEN SATURATION: 95 % | BODY MASS INDEX: 34.37 KG/M2 | TEMPERATURE: 98.6 F | HEART RATE: 73 BPM | DIASTOLIC BLOOD PRESSURE: 78 MMHG

## 2019-05-07 DIAGNOSIS — M25.512 CHRONIC LEFT SHOULDER PAIN: ICD-10-CM

## 2019-05-07 DIAGNOSIS — G89.29 CHRONIC LEFT SHOULDER PAIN: ICD-10-CM

## 2019-05-07 DIAGNOSIS — G89.4 CHRONIC PAIN SYNDROME: ICD-10-CM

## 2019-05-07 DIAGNOSIS — R09.81 CONGESTION OF PARANASAL SINUS: Primary | ICD-10-CM

## 2019-05-07 DIAGNOSIS — M51.369 DDD (DEGENERATIVE DISC DISEASE), LUMBAR: ICD-10-CM

## 2019-05-07 RX ORDER — GUAIFENESIN 600 MG/1
600 TABLET, EXTENDED RELEASE ORAL 2 TIMES DAILY
Qty: 20 TABLET | Refills: 0 | Status: SHIPPED | OUTPATIENT
Start: 2019-05-07 | End: 2019-05-21

## 2019-05-07 RX ORDER — FUROSEMIDE 40 MG
40 TABLET ORAL EVERY MORNING
COMMUNITY
End: 2019-05-10

## 2019-05-07 NOTE — LETTER
"    5/7/2019        RE: [facility] [place]  [address]  [facility] [address]        [facility]  [facility] Record Number:  [id number]  Place of Service where encounter took place:  [facility] [[id number]]  Chief Complaint   Patient presents with     RECHECK       HPI:    Jazmin Clifton  is a 86 year old (12/30/1932), who is being seen today for an episodic care visit.  HPI information obtained from: facility chart records, facility staff, patient report and [facility] Epic chart review. Today's concern is:     Congestion of paranasal sinus  Chronic pain syndrome  DDD (degenerative disc disease), lumbar  Chronic left shoulder pain   Patient seen today per her request as she reports congestion in her right ear. States ear starting feeling \"plugged\" a few days ago, feels like she is \"hearing through a fish bowl.\" Had a sore throat over the weekend, which is now mostly resolved reports she also had a lot phlegm in her throat, and woke up \"choking\" but this is also improving. Does have some mild sinus tenderness on the right side of her face. Has been eating without issues, no difficulty chewing or swallowing. Is afebrile. No headache. No cough or SOB.     Discussed pain with her today. She appears to have increased movement in her left shoulder. States it is stiff in the morning, but she does some exercises with it, and that does help. Does get some pain that radiates up into her neck with stretches it. Discussed imagine beyond x-ray, but she states she would not want any further surgeries so question usefulness of imagine as likely would not change treatment. She was agreeable to this.     She does report more pain in her hips and having a harder time walking. Home PT has signed off as she was no longer improving and only had limited participation. Discussed importance of physical activity, and that moving may actually help with some of her " joint pain as she reports she feels stiff and frustrated by her limited mobility. Was agreeable to try exercise classes at the facility. She has been taking her oxycodone TID as scheduled along with her PRN tizanidine together, she does feel this combination is helpful. Staff report she does complain of feeling sleepy during the day. No recent falls.     Past Medical and Surgical History reviewed in Epic today.    MEDICATIONS:  Current Outpatient Medications   Medication Sig Dispense Refill     ACETAMINOPHEN EXTRA STRENGTH 500 MG tablet TAKE TWO TABLETS (1000MG) BY MOUTH THREE TIMES DAILY 120 tablet 98     Alendronate Sodium (FOSAMAX PO) Take 70 mg by mouth once a week Take 1 tab once weekly (Thursday) on an empty stomach with full glass of water. Remain upright for 30 minutes. Wait 30 minutes before eating       ASPIRIN LOW DOSE 81 MG chewable tablet CHEW AND SWALLOW ONE TABLET BY MOUTH ONCE DAILY 30 tablet 11     atorvastatin (LIPITOR) 40 MG tablet TAKE 1 TABLET BY MOUTH ONCE DAILY 30 tablet 98     Blood Glucose Monitoring Suppl CORY 2 times daily Call nurse for further directions if blood glucose is less than 80 or greater than 250       calcium carbonate (TUMS) 500 MG chewable tablet Take 1 chew tab by mouth every hour as needed for heartburn       DONEPEZIL HCL PO Take 5 mg by mouth daily       DULoxetine (CYMBALTA) 30 MG capsule Take 2 capsules (60 mg) by mouth daily AND 1 capsule (30 mg) At Bedtime. 90 capsule 11     FEROSUL 325 (65 Fe) MG tablet TAKE 1 TABLET BY MOUTH TWICE DAILY 60 tablet 98     fluticasone-vilanterol (BREO ELLIPTA) 100-25 MCG/INH oral inhaler Inhale 1 puff into the lungs daily       furosemide (LASIX) 40 MG tablet Take 40 mg by mouth every morning Give at 8:00 am and and 2:00 pm       gabapentin (NEURONTIN) 100 MG capsule Take 2 capsules (200 mg) by mouth 2 times daily 120 capsule 11     guaiFENesin (MUCINEX) 600 MG 12 hr tablet Take 1 tablet (600 mg) by mouth 2 times daily for 10 days 20  tablet 0     isosorbide mononitrate (IMDUR) 30 MG 24 hr tablet TAKE 1 TABLET BY MOUTH ONCE DAILY 30 tablet 98     isradipine (DYNACIRC) 5 MG capsule Take 5 mg by mouth 2 times daily       levalbuterol (XOPENEX) 1.25 MG/3ML neb solution Take 1 ampule by nebulization every 4 hours as needed for shortness of breath / dyspnea or wheezing And every 4 hours PRN       Lidocaine (LIDOCARE) 4 % Patch Place 1 patch onto the skin daily as needed To desired area (shoulder or hip). On for 12hrs, off for 12hrs       lisinopril (PRINIVIL/ZESTRIL) 20 MG tablet TAKE 1 TABLET BY MOUTH ONCE DAILY 30 tablet 98     MAGNESIUM OXIDE PO Take 250 mg by mouth daily       memantine (NAMENDA) 5 MG tablet TAKE 1 TABLET BY MOUTH ONCE DAILY 30 tablet 98     Menthol, Topical Analgesic, (BIOFREEZE) 4 % GEL Externally apply topically 4 times daily as needed To left shoulder and right hip       nystatin (MYCOSTATIN) 654455 UNIT/GM external powder APPLY TOPICALLY TO ABDOMEN FOLDS, GROIN, AND BREASTS TWICE DAILY AS NEEDED 60 g 97     OMEPRAZOLE PO Take 40 mg by mouth every morning       oxyCODONE HCl (OXAYDO) 5 MG TABA Take 5 mg by mouth 3 times daily And 1 tablet 2 times a day as needed       senna-docusate (SENOKOT-S/PERICOLACE) 8.6-50 MG tablet Take 1 tablet by mouth 2 times daily as needed        tiZANidine (ZANAFLEX) 2 MG tablet Take 2 mg by mouth 3 times daily as needed for muscle spasms        vitamin C (ASCORBIC ACID) 500 MG tablet TAKE 1 TABLET BY MOUTH ONCE DAILY 30 tablet 98     Calcium Carb-Cholecalciferol (CALCIUM + D3) 600-200 MG-UNIT TABS TAKE 1 TABLET BY MOUTH ONCE DAILY 30 tablet 98     furosemide (LASIX) 40 MG tablet Take 1 tablet (40 mg) by mouth 2 times daily 60 tablet 11     hypromellose-dextran (ARTIFICAL TEARS) 0.1-0.3 % ophthalmic solution Place 1 drop into both eyes every 6 hours as needed for dry eyes       melatonin 3 MG CAPS Take 3 mg by mouth At Bedtime           REVIEW OF SYSTEMS:  4 point ROS including Respiratory, CV, GI  and , other than that noted in the HPI,  is negative    Objective:  /78   Pulse 73   Temp 98.6  F (37  C)   Resp 24   Wt 88 kg (194 lb)   SpO2 95%   BMI 34.37 kg/m     Exam:  GENERAL APPEARANCE:  Alert, in no distress, cooperative  ENT:  oral mucous membranes moist, frontal sinus tenderness external ear WNL, no pain to touch or ear movement  RESP:  respiratory effort and palpation of chest normal, lungs clear to auscultation , no respiratory distress  CV:  Palpation and auscultation of heart done , regular rate and rhythm, no murmur, rub, or gallop, peripheral edema 2+ in BLE, (lymph wraps in place)  ABDOMEN:  no guarding or rebound, bowel sounds normal  M/S:   limited ambulated, decreased but improved ROM to left shoulder, decreased ROM to bialt hip, BLE strenght 4/5  SKIN:  Inspection of skin and subcutaneous tissue baseline  NEURO:   Cranial nerves 2-12 are normal tested and grossly at patient's baseline  PSYCH:  oriented X 3, normal insight, judgement and memory, affect and mood normal    Labs:   Recent labs in [facility] reviewed by me today.     ASSESSMENT/PLAN:  (R09.81) Congestion of paranasal sinus  (primary encounter diagnosis)  Comment: Suspect virus as she is afebrile, only mild tenderness, no ear pain; symptoms only lasting a few days  Plan: Mucinex 600mg BID x 10 days, will avoid sudafed as likely to cause HTN, supportives cares, continue to monitor    (G89.4) Chronic pain syndrome  (M51.36) DDD (degenerative disc disease), lumbar  (M25.512,  G89.29) Chronic left shoulder pain  Comment: H/o of 7 previous spinal surgeries. Continues with chronic low back pain and right hip pain; shoulder pain going on >4 months after another resident fell into her on elevator. Multiple x-rays or arm negative, suspect muscle injury, though given ongoing symptoms could be rotator cuff injury or ligament injury. However patient  Has been adamant about not want more surgeries. Some improvement with therapy and  exercise. Appears to have more use of arm when not conscious of arm movement.    Plan: Duloxetine  60mg q AM and 30mg at bedtime, gabapentin to 200mg BID.  continue oxycodone 5mg TID and BID PRN - concerned that higher doses may cause increased side effects and falls, tizanidine 2mg TID PRN, APAP 1000mg TID, lidocaine patches to right hip; change biofreeze to PRN per patient request; continue PT/OT. Has appointment to establish care with pain clinic on 5/14;     transcribed by : Aliyah Milan  Orders:  1. Mucinex 600 mg PO every 12 hours x 10 days - Dx: nasal congestion      Electronically signed by:  MARY Gómez CNP             Sincerely,        MARY Gómez CNP

## 2019-05-09 ENCOUNTER — HOSPITAL LABORATORY (OUTPATIENT)
Facility: OTHER | Age: 84
End: 2019-05-09

## 2019-05-09 LAB
ANION GAP SERPL CALCULATED.3IONS-SCNC: 7 MMOL/L (ref 3–14)
BUN SERPL-MCNC: 34 MG/DL (ref 7–30)
CALCIUM SERPL-MCNC: 9.9 MG/DL (ref 8.5–10.1)
CHLORIDE SERPL-SCNC: 106 MMOL/L (ref 94–109)
CO2 SERPL-SCNC: 30 MMOL/L (ref 20–32)
CREAT SERPL-MCNC: 1.15 MG/DL (ref 0.52–1.04)
GFR SERPL CREATININE-BSD FRML MDRD: 43 ML/MIN/{1.73_M2}
GLUCOSE SERPL-MCNC: 146 MG/DL (ref 70–99)
POTASSIUM SERPL-SCNC: 3.7 MMOL/L (ref 3.4–5.3)
SODIUM SERPL-SCNC: 143 MMOL/L (ref 133–144)

## 2019-05-10 ENCOUNTER — HOSPITAL ENCOUNTER (OUTPATIENT)
Facility: CLINIC | Age: 84
Setting detail: OBSERVATION
Discharge: NURSING, REHAB OR LONG-TERM CARE | End: 2019-05-12
Attending: EMERGENCY MEDICINE | Admitting: INTERNAL MEDICINE
Payer: COMMERCIAL

## 2019-05-10 ENCOUNTER — APPOINTMENT (OUTPATIENT)
Dept: GENERAL RADIOLOGY | Facility: CLINIC | Age: 84
End: 2019-05-10
Attending: EMERGENCY MEDICINE
Payer: COMMERCIAL

## 2019-05-10 DIAGNOSIS — M54.5 CHRONIC LOW BACK PAIN, UNSPECIFIED BACK PAIN LATERALITY, WITH SCIATICA PRESENCE UNSPECIFIED: ICD-10-CM

## 2019-05-10 DIAGNOSIS — M25.559 CHRONIC HIP PAIN, UNSPECIFIED LATERALITY: ICD-10-CM

## 2019-05-10 DIAGNOSIS — J44.1 COPD EXACERBATION (H): ICD-10-CM

## 2019-05-10 DIAGNOSIS — G89.4 CHRONIC PAIN SYNDROME: Primary | ICD-10-CM

## 2019-05-10 DIAGNOSIS — R09.02 HYPOXIA: ICD-10-CM

## 2019-05-10 DIAGNOSIS — N30.00 ACUTE CYSTITIS WITHOUT HEMATURIA: ICD-10-CM

## 2019-05-10 DIAGNOSIS — G89.29 CHRONIC LOW BACK PAIN, UNSPECIFIED BACK PAIN LATERALITY, WITH SCIATICA PRESENCE UNSPECIFIED: ICD-10-CM

## 2019-05-10 DIAGNOSIS — Z87.891 PERSONAL HISTORY OF TOBACCO USE, PRESENTING HAZARDS TO HEALTH: ICD-10-CM

## 2019-05-10 DIAGNOSIS — G89.29 CHRONIC HIP PAIN, UNSPECIFIED LATERALITY: ICD-10-CM

## 2019-05-10 DIAGNOSIS — N81.4 UTERINE PROLAPSE: Primary | ICD-10-CM

## 2019-05-10 PROBLEM — R53.1 WEAKNESS: Status: ACTIVE | Noted: 2019-05-10

## 2019-05-10 LAB
ALBUMIN SERPL-MCNC: 3.2 G/DL (ref 3.4–5)
ALBUMIN UR-MCNC: NEGATIVE MG/DL
ALP SERPL-CCNC: 79 U/L (ref 40–150)
ALT SERPL W P-5'-P-CCNC: 13 U/L (ref 0–50)
ANION GAP SERPL CALCULATED.3IONS-SCNC: 5 MMOL/L (ref 3–14)
APPEARANCE UR: ABNORMAL
AST SERPL W P-5'-P-CCNC: 11 U/L (ref 0–45)
BACTERIA #/AREA URNS HPF: ABNORMAL /HPF
BASE EXCESS BLDV CALC-SCNC: 5.1 MMOL/L
BASOPHILS # BLD AUTO: 0 10E9/L (ref 0–0.2)
BASOPHILS NFR BLD AUTO: 0.4 %
BILIRUB SERPL-MCNC: 0.4 MG/DL (ref 0.2–1.3)
BILIRUB UR QL STRIP: NEGATIVE
BUN SERPL-MCNC: 36 MG/DL (ref 7–30)
CALCIUM SERPL-MCNC: 9.6 MG/DL (ref 8.5–10.1)
CHLORIDE SERPL-SCNC: 105 MMOL/L (ref 94–109)
CO2 SERPL-SCNC: 33 MMOL/L (ref 20–32)
COLOR UR AUTO: YELLOW
CREAT SERPL-MCNC: 1.43 MG/DL (ref 0.52–1.04)
DIFFERENTIAL METHOD BLD: ABNORMAL
EOSINOPHIL # BLD AUTO: 0.2 10E9/L (ref 0–0.7)
EOSINOPHIL NFR BLD AUTO: 2.4 %
ERYTHROCYTE [DISTWIDTH] IN BLOOD BY AUTOMATED COUNT: 15.4 % (ref 10–15)
GFR SERPL CREATININE-BSD FRML MDRD: 33 ML/MIN/{1.73_M2}
GLUCOSE SERPL-MCNC: 112 MG/DL (ref 70–99)
GLUCOSE UR STRIP-MCNC: NEGATIVE MG/DL
HCO3 BLDV-SCNC: 32 MMOL/L (ref 21–28)
HCT VFR BLD AUTO: 39 % (ref 35–47)
HGB BLD-MCNC: 11.9 G/DL (ref 11.7–15.7)
HGB UR QL STRIP: ABNORMAL
HYALINE CASTS #/AREA URNS LPF: 1 /LPF (ref 0–2)
IMM GRANULOCYTES # BLD: 0 10E9/L (ref 0–0.4)
IMM GRANULOCYTES NFR BLD: 0.3 %
INR PPP: 1.02 (ref 0.86–1.14)
KETONES UR STRIP-MCNC: NEGATIVE MG/DL
LACTATE BLD-SCNC: 1.3 MMOL/L (ref 0.7–2)
LEUKOCYTE ESTERASE UR QL STRIP: ABNORMAL
LIPASE SERPL-CCNC: 112 U/L (ref 73–393)
LYMPHOCYTES # BLD AUTO: 1 10E9/L (ref 0.8–5.3)
LYMPHOCYTES NFR BLD AUTO: 13.4 %
MCH RBC QN AUTO: 28.4 PG (ref 26.5–33)
MCHC RBC AUTO-ENTMCNC: 30.5 G/DL (ref 31.5–36.5)
MCV RBC AUTO: 93 FL (ref 78–100)
MONOCYTES # BLD AUTO: 0.7 10E9/L (ref 0–1.3)
MONOCYTES NFR BLD AUTO: 8.9 %
NEUTROPHILS # BLD AUTO: 5.8 10E9/L (ref 1.6–8.3)
NEUTROPHILS NFR BLD AUTO: 74.6 %
NITRATE UR QL: NEGATIVE
NRBC # BLD AUTO: 0 10*3/UL
NRBC BLD AUTO-RTO: 0 /100
NT-PROBNP SERPL-MCNC: 216 PG/ML (ref 0–1800)
O2/TOTAL GAS SETTING VFR VENT: ABNORMAL %
PCO2 BLDV: 59 MM HG (ref 40–50)
PH BLDV: 7.35 PH (ref 7.32–7.43)
PH UR STRIP: 5 PH (ref 5–7)
PLATELET # BLD AUTO: 167 10E9/L (ref 150–450)
PO2 BLDV: 29 MM HG (ref 25–47)
POTASSIUM SERPL-SCNC: 3.8 MMOL/L (ref 3.4–5.3)
PROT SERPL-MCNC: 6 G/DL (ref 6.8–8.8)
RBC # BLD AUTO: 4.19 10E12/L (ref 3.8–5.2)
RBC #/AREA URNS AUTO: >182 /HPF (ref 0–2)
SODIUM SERPL-SCNC: 143 MMOL/L (ref 133–144)
SOURCE: ABNORMAL
SP GR UR STRIP: 1.01 (ref 1–1.03)
TROPONIN I SERPL-MCNC: <0.015 UG/L (ref 0–0.04)
TSH SERPL DL<=0.005 MIU/L-ACNC: 0.51 MU/L (ref 0.4–4)
UROBILINOGEN UR STRIP-MCNC: 0 MG/DL (ref 0–2)
WBC # BLD AUTO: 7.8 10E9/L (ref 4–11)
WBC #/AREA URNS AUTO: 46 /HPF (ref 0–5)

## 2019-05-10 PROCEDURE — 83605 ASSAY OF LACTIC ACID: CPT | Performed by: EMERGENCY MEDICINE

## 2019-05-10 PROCEDURE — 71045 X-RAY EXAM CHEST 1 VIEW: CPT

## 2019-05-10 PROCEDURE — 85025 COMPLETE CBC W/AUTO DIFF WBC: CPT | Performed by: EMERGENCY MEDICINE

## 2019-05-10 PROCEDURE — 96365 THER/PROPH/DIAG IV INF INIT: CPT | Performed by: EMERGENCY MEDICINE

## 2019-05-10 PROCEDURE — 82803 BLOOD GASES ANY COMBINATION: CPT | Performed by: EMERGENCY MEDICINE

## 2019-05-10 PROCEDURE — G0378 HOSPITAL OBSERVATION PER HR: HCPCS

## 2019-05-10 PROCEDURE — 99285 EMERGENCY DEPT VISIT HI MDM: CPT | Mod: 25 | Performed by: EMERGENCY MEDICINE

## 2019-05-10 PROCEDURE — 80053 COMPREHEN METABOLIC PANEL: CPT | Performed by: EMERGENCY MEDICINE

## 2019-05-10 PROCEDURE — 81001 URINALYSIS AUTO W/SCOPE: CPT | Performed by: EMERGENCY MEDICINE

## 2019-05-10 PROCEDURE — 87086 URINE CULTURE/COLONY COUNT: CPT | Performed by: INTERNAL MEDICINE

## 2019-05-10 PROCEDURE — 51702 INSERT TEMP BLADDER CATH: CPT | Performed by: EMERGENCY MEDICINE

## 2019-05-10 PROCEDURE — 84484 ASSAY OF TROPONIN QUANT: CPT | Performed by: EMERGENCY MEDICINE

## 2019-05-10 PROCEDURE — 93005 ELECTROCARDIOGRAM TRACING: CPT | Mod: 59 | Performed by: EMERGENCY MEDICINE

## 2019-05-10 PROCEDURE — 25000128 H RX IP 250 OP 636: Performed by: EMERGENCY MEDICINE

## 2019-05-10 PROCEDURE — 96375 TX/PRO/DX INJ NEW DRUG ADDON: CPT | Mod: 59 | Performed by: EMERGENCY MEDICINE

## 2019-05-10 PROCEDURE — 25000132 ZZH RX MED GY IP 250 OP 250 PS 637: Performed by: EMERGENCY MEDICINE

## 2019-05-10 PROCEDURE — 85610 PROTHROMBIN TIME: CPT | Performed by: EMERGENCY MEDICINE

## 2019-05-10 PROCEDURE — 96361 HYDRATE IV INFUSION ADD-ON: CPT | Mod: 59 | Performed by: EMERGENCY MEDICINE

## 2019-05-10 PROCEDURE — C9113 INJ PANTOPRAZOLE SODIUM, VIA: HCPCS | Performed by: EMERGENCY MEDICINE

## 2019-05-10 PROCEDURE — 96376 TX/PRO/DX INJ SAME DRUG ADON: CPT | Mod: 59

## 2019-05-10 PROCEDURE — 25000125 ZZHC RX 250: Performed by: EMERGENCY MEDICINE

## 2019-05-10 PROCEDURE — 84443 ASSAY THYROID STIM HORMONE: CPT | Performed by: EMERGENCY MEDICINE

## 2019-05-10 PROCEDURE — 93010 ELECTROCARDIOGRAM REPORT: CPT | Mod: Z6 | Performed by: EMERGENCY MEDICINE

## 2019-05-10 PROCEDURE — 94640 AIRWAY INHALATION TREATMENT: CPT

## 2019-05-10 PROCEDURE — 83690 ASSAY OF LIPASE: CPT | Performed by: EMERGENCY MEDICINE

## 2019-05-10 PROCEDURE — 83880 ASSAY OF NATRIURETIC PEPTIDE: CPT | Performed by: EMERGENCY MEDICINE

## 2019-05-10 PROCEDURE — 25000132 ZZH RX MED GY IP 250 OP 250 PS 637: Performed by: INTERNAL MEDICINE

## 2019-05-10 PROCEDURE — 99220 ZZC INITIAL OBSERVATION CARE,LEVL III: CPT | Performed by: INTERNAL MEDICINE

## 2019-05-10 RX ORDER — ONDANSETRON 2 MG/ML
4 INJECTION INTRAMUSCULAR; INTRAVENOUS EVERY 6 HOURS PRN
Status: DISCONTINUED | OUTPATIENT
Start: 2019-05-10 | End: 2019-05-10

## 2019-05-10 RX ORDER — FUROSEMIDE 40 MG
40 TABLET ORAL 2 TIMES DAILY
Status: DISCONTINUED | OUTPATIENT
Start: 2019-05-10 | End: 2019-05-12 | Stop reason: HOSPADM

## 2019-05-10 RX ORDER — METHYLPREDNISOLONE SODIUM SUCCINATE 125 MG/2ML
125 INJECTION, POWDER, LYOPHILIZED, FOR SOLUTION INTRAMUSCULAR; INTRAVENOUS ONCE
Status: COMPLETED | OUTPATIENT
Start: 2019-05-10 | End: 2019-05-10

## 2019-05-10 RX ORDER — ONDANSETRON 4 MG/1
4 TABLET, ORALLY DISINTEGRATING ORAL EVERY 6 HOURS PRN
Status: DISCONTINUED | OUTPATIENT
Start: 2019-05-10 | End: 2019-05-10

## 2019-05-10 RX ORDER — LISINOPRIL 20 MG/1
20 TABLET ORAL DAILY
Status: DISCONTINUED | OUTPATIENT
Start: 2019-05-11 | End: 2019-05-12 | Stop reason: HOSPADM

## 2019-05-10 RX ORDER — LIDOCAINE 4 G/G
1 PATCH TOPICAL DAILY PRN
Status: DISCONTINUED | OUTPATIENT
Start: 2019-05-10 | End: 2019-05-12 | Stop reason: HOSPADM

## 2019-05-10 RX ORDER — IPRATROPIUM BROMIDE AND ALBUTEROL SULFATE 2.5; .5 MG/3ML; MG/3ML
3 SOLUTION RESPIRATORY (INHALATION) ONCE
Status: COMPLETED | OUTPATIENT
Start: 2019-05-10 | End: 2019-05-10

## 2019-05-10 RX ORDER — CEFTRIAXONE SODIUM 1 G/50ML
1 INJECTION, SOLUTION INTRAVENOUS ONCE
Status: COMPLETED | OUTPATIENT
Start: 2019-05-10 | End: 2019-05-10

## 2019-05-10 RX ORDER — CEFTRIAXONE SODIUM 1 G/50ML
1 INJECTION, SOLUTION INTRAVENOUS EVERY 24 HOURS
Status: DISCONTINUED | OUTPATIENT
Start: 2019-05-11 | End: 2019-05-12 | Stop reason: HOSPADM

## 2019-05-10 RX ORDER — GABAPENTIN 100 MG/1
200 CAPSULE ORAL 2 TIMES DAILY
Status: DISCONTINUED | OUTPATIENT
Start: 2019-05-10 | End: 2019-05-12 | Stop reason: HOSPADM

## 2019-05-10 RX ORDER — METHYLPREDNISOLONE SODIUM SUCCINATE 125 MG/2ML
40 INJECTION, POWDER, LYOPHILIZED, FOR SOLUTION INTRAMUSCULAR; INTRAVENOUS EVERY 8 HOURS
Status: DISCONTINUED | OUTPATIENT
Start: 2019-05-10 | End: 2019-05-11

## 2019-05-10 RX ORDER — MEMANTINE HYDROCHLORIDE 5 MG/1
5 TABLET ORAL DAILY
Status: DISCONTINUED | OUTPATIENT
Start: 2019-05-10 | End: 2019-05-12 | Stop reason: HOSPADM

## 2019-05-10 RX ORDER — FLUTICASONE PROPIONATE 50 MCG
1 SPRAY, SUSPENSION (ML) NASAL 2 TIMES DAILY
Status: DISCONTINUED | OUTPATIENT
Start: 2019-05-10 | End: 2019-05-12 | Stop reason: HOSPADM

## 2019-05-10 RX ORDER — ACETAMINOPHEN 325 MG/1
650 TABLET ORAL EVERY 4 HOURS PRN
Status: DISCONTINUED | OUTPATIENT
Start: 2019-05-10 | End: 2019-05-12 | Stop reason: HOSPADM

## 2019-05-10 RX ORDER — FLUTICASONE PROPIONATE 50 MCG
1 SPRAY, SUSPENSION (ML) NASAL 2 TIMES DAILY
COMMUNITY
End: 2019-07-24

## 2019-05-10 RX ORDER — ONDANSETRON 2 MG/ML
4 INJECTION INTRAMUSCULAR; INTRAVENOUS EVERY 6 HOURS PRN
Status: DISCONTINUED | OUTPATIENT
Start: 2019-05-10 | End: 2019-05-12 | Stop reason: HOSPADM

## 2019-05-10 RX ORDER — TIZANIDINE 2 MG/1
TABLET ORAL
Qty: 90 TABLET | Refills: 97 | Status: ON HOLD | OUTPATIENT
Start: 2019-05-10 | End: 2019-09-05

## 2019-05-10 RX ORDER — NALOXONE HYDROCHLORIDE 0.4 MG/ML
.1-.4 INJECTION, SOLUTION INTRAMUSCULAR; INTRAVENOUS; SUBCUTANEOUS
Status: DISCONTINUED | OUTPATIENT
Start: 2019-05-10 | End: 2019-05-12 | Stop reason: HOSPADM

## 2019-05-10 RX ORDER — FELODIPINE 5 MG/1
10 TABLET, EXTENDED RELEASE ORAL
Status: DISCONTINUED | OUTPATIENT
Start: 2019-05-11 | End: 2019-05-12 | Stop reason: HOSPADM

## 2019-05-10 RX ORDER — ATORVASTATIN CALCIUM 20 MG/1
40 TABLET, FILM COATED ORAL AT BEDTIME
Status: DISCONTINUED | OUTPATIENT
Start: 2019-05-10 | End: 2019-05-12 | Stop reason: HOSPADM

## 2019-05-10 RX ORDER — ACETAMINOPHEN 650 MG/1
650 SUPPOSITORY RECTAL EVERY 4 HOURS PRN
Status: DISCONTINUED | OUTPATIENT
Start: 2019-05-10 | End: 2019-05-12 | Stop reason: HOSPADM

## 2019-05-10 RX ORDER — SODIUM CHLORIDE 9 MG/ML
1000 INJECTION, SOLUTION INTRAVENOUS CONTINUOUS
Status: DISCONTINUED | OUTPATIENT
Start: 2019-05-10 | End: 2019-05-10

## 2019-05-10 RX ORDER — OXYCODONE HYDROCHLORIDE 5 MG/1
5 TABLET ORAL 3 TIMES DAILY
Status: DISCONTINUED | OUTPATIENT
Start: 2019-05-10 | End: 2019-05-12 | Stop reason: HOSPADM

## 2019-05-10 RX ORDER — AZITHROMYCIN 250 MG/1
500 TABLET, FILM COATED ORAL ONCE
Status: DISCONTINUED | OUTPATIENT
Start: 2019-05-10 | End: 2019-05-10

## 2019-05-10 RX ORDER — ISRADIPINE 5 MG/1
5 CAPSULE ORAL 2 TIMES DAILY
Status: DISCONTINUED | OUTPATIENT
Start: 2019-05-10 | End: 2019-05-10 | Stop reason: CLARIF

## 2019-05-10 RX ORDER — DULOXETIN HYDROCHLORIDE 30 MG/1
60 CAPSULE, DELAYED RELEASE ORAL DAILY
Status: DISCONTINUED | OUTPATIENT
Start: 2019-05-11 | End: 2019-05-12 | Stop reason: HOSPADM

## 2019-05-10 RX ORDER — IPRATROPIUM BROMIDE AND ALBUTEROL SULFATE 2.5; .5 MG/3ML; MG/3ML
3 SOLUTION RESPIRATORY (INHALATION) EVERY 4 HOURS PRN
Status: DISCONTINUED | OUTPATIENT
Start: 2019-05-10 | End: 2019-05-11

## 2019-05-10 RX ORDER — ONDANSETRON 4 MG/1
4 TABLET, ORALLY DISINTEGRATING ORAL EVERY 6 HOURS PRN
Status: DISCONTINUED | OUTPATIENT
Start: 2019-05-10 | End: 2019-05-12 | Stop reason: HOSPADM

## 2019-05-10 RX ORDER — LEVALBUTEROL INHALATION SOLUTION 1.25 MG/3ML
1 SOLUTION RESPIRATORY (INHALATION) EVERY 4 HOURS PRN
Status: DISCONTINUED | OUTPATIENT
Start: 2019-05-10 | End: 2019-05-12 | Stop reason: HOSPADM

## 2019-05-10 RX ORDER — TIZANIDINE 2 MG/1
2 TABLET ORAL EVERY 6 HOURS PRN
Status: DISCONTINUED | OUTPATIENT
Start: 2019-05-10 | End: 2019-05-12 | Stop reason: HOSPADM

## 2019-05-10 RX ORDER — AZITHROMYCIN 250 MG/1
250 TABLET, FILM COATED ORAL EVERY 24 HOURS
Status: DISCONTINUED | OUTPATIENT
Start: 2019-05-11 | End: 2019-05-12 | Stop reason: HOSPADM

## 2019-05-10 RX ORDER — ASPIRIN 81 MG/1
81 TABLET, CHEWABLE ORAL DAILY
Status: DISCONTINUED | OUTPATIENT
Start: 2019-05-11 | End: 2019-05-12 | Stop reason: HOSPADM

## 2019-05-10 RX ORDER — DONEPEZIL HYDROCHLORIDE 5 MG/1
5 TABLET, FILM COATED ORAL EVERY EVENING
Status: DISCONTINUED | OUTPATIENT
Start: 2019-05-10 | End: 2019-05-12 | Stop reason: HOSPADM

## 2019-05-10 RX ADMIN — TIZANIDINE 2 MG: 2 TABLET ORAL at 18:32

## 2019-05-10 RX ADMIN — MEMANTINE 5 MG: 5 TABLET ORAL at 18:17

## 2019-05-10 RX ADMIN — OXYCODONE HYDROCHLORIDE 5 MG: 5 TABLET ORAL at 21:04

## 2019-05-10 RX ADMIN — METHYLPREDNISOLONE SODIUM SUCCINATE 125 MG: 125 INJECTION, POWDER, FOR SOLUTION INTRAMUSCULAR; INTRAVENOUS at 11:21

## 2019-05-10 RX ADMIN — METHYLPREDNISOLONE SODIUM SUCCINATE 37.5 MG: 125 INJECTION, POWDER, FOR SOLUTION INTRAMUSCULAR; INTRAVENOUS at 18:17

## 2019-05-10 RX ADMIN — SODIUM CHLORIDE 1000 ML: 9 INJECTION, SOLUTION INTRAVENOUS at 11:06

## 2019-05-10 RX ADMIN — ATORVASTATIN CALCIUM 40 MG: 20 TABLET, FILM COATED ORAL at 21:04

## 2019-05-10 RX ADMIN — IPRATROPIUM BROMIDE AND ALBUTEROL SULFATE 3 ML: .5; 3 SOLUTION RESPIRATORY (INHALATION) at 11:15

## 2019-05-10 RX ADMIN — FLUTICASONE PROPIONATE 1 SPRAY: 50 SPRAY, METERED NASAL at 21:04

## 2019-05-10 RX ADMIN — PANTOPRAZOLE SODIUM 40 MG: 40 INJECTION, POWDER, FOR SOLUTION INTRAVENOUS at 11:23

## 2019-05-10 RX ADMIN — AZITHROMYCIN 500 MG: 250 TABLET, FILM COATED ORAL at 15:04

## 2019-05-10 RX ADMIN — CEFTRIAXONE SODIUM 1 G: 1 INJECTION, SOLUTION INTRAVENOUS at 15:04

## 2019-05-10 RX ADMIN — ACETAMINOPHEN 650 MG: 325 TABLET, FILM COATED ORAL at 21:21

## 2019-05-10 RX ADMIN — FUROSEMIDE 40 MG: 40 TABLET ORAL at 21:04

## 2019-05-10 RX ADMIN — Medication 200 MG: at 18:17

## 2019-05-10 RX ADMIN — GABAPENTIN 200 MG: 100 CAPSULE ORAL at 21:04

## 2019-05-10 RX ADMIN — DONEPEZIL HYDROCHLORIDE 5 MG: 5 TABLET ORAL at 18:17

## 2019-05-10 ASSESSMENT — MIFFLIN-ST. JEOR: SCORE: 1311.79

## 2019-05-10 ASSESSMENT — PAIN DESCRIPTION - DESCRIPTORS: DESCRIPTORS: CONSTANT

## 2019-05-10 NOTE — ED NOTES
This RN attempted to straight catheterize the patient, unsuccessfully. The patient has a red protruding piece of tissue coming from the urethra area. MD called into see the tissue, and will consult GYN.

## 2019-05-10 NOTE — PROGRESS NOTES
"After receiving \"blue slip\" indicating a high probability of admission, this chart was accessed to assess needs and begin patient care planning.  "

## 2019-05-10 NOTE — ED NOTES
Pt here via w/c from the Krishnamurthy from  with hypotension and low oxygen. The patient is in chronic right hip and back pain.

## 2019-05-10 NOTE — PROGRESS NOTES
Observation Brochure and Video    Patient informed of observation status based on provider's order.  Observation brochure was given and read through with patient.  She will hold on using the nasal spray at this time due to observation and thinks maybe her son could bring this in if [facility] let him.  Patient/Family stated understanding. Questions answered.  Deidre Christie

## 2019-05-10 NOTE — ED PROVIDER NOTES
History     Chief Complaint   Patient presents with     Hypotension     OT recommended pt be evaluated in ER due to low blood pressure and low oxygen sats.      HPI  Jazmin Clifton is a 86 year old female who presents from the [facility] [place] with concerns for low blood pressure and low O2 saturation.  Occupational therapist was evaluating the patient for ongoing chronic back and shoulder pain.  Noted to be hypotensive and having low sats which did not improve with O2.  Her son who does present with her states she had pneumonia in the past but has not had anything recently.  She does have significant chronic problems including her spinal stenosis and chronic shoulder issues.  She also has coronary artery disease, CHF, hypertension, COPD, anxiety, and previous stroke.  Patient is on multiple medicines and did receive acetaminophen, aspirin, Breo inhaler, duloxetine, iron tablet, fluconazole nasal spray, Lasix, gabapentin, isosorbide,isradipine, lisinopril, Mucinex, tizanidine and oxycodone.  Records reveal that before her a.m. meds her blood pressure was 183/84.  When the OT provider visited her blood pressure was 7484 pulse was 78 and O2 sat was 87%.  After she did some pursed lip breathing, was given some fluids, her blood pressure was up to 88/48 and O2 sat was 92%.  Here in the department patient states she does feel somewhat weak.  She also had some rectal bleeding yesterday with some stringy clots.  Apparently had this years ago and had endoscopy it sounds like a procedure for bleeding.  In the department patient just complains of generalized weakness and her ongoing issues with shoulder, back, and hip pain.  She states that that always happens in the morning when she takes her meds.  She currently denies headache or neck pain.  She feels generally weak and has no focal motor weakness.  She denies any chest pain and does not feel short of breath.  She is taking shallow respirations but states that is  "normal for her.  She has not had a productive cough.  She has abdominal pain but states that is been going on for a long time.  She has had both upper endoscopy and colonoscopy.  Records show this was done back in 2006.  The upper endoscopy did show a duodenal polyp otherwise no active bleeding or abnormality.  Colonoscopy revealed few nonbleeding colonic angioma ectasias.  Biopsies were obtained and were described as normal appearing colonic mucosa.  Patient states she has intermittent nausea but that \"happens all the time\".  He denies nausea currently.  She is on omeprazole for acid issues. Patient did have blood test done yesterday which showed a creatinine of 1.15 and a BUN of 34.  Her glucose was 146 .  There is no significant change from one done 5/2/2019.  5/2 she did have a hemoglobin obtained and it was 11.9 which was decreased from 13 down on 4/4/19.  Patient has chronic peripheral edema.  She wears compressive stockings and they are placed on her today.    Allergies:  Allergies   Allergen Reactions     Accupril      Ace Inhibitors Unknown     Accupril     Augmentin Unknown     Blood-Group Specific Substance      Other reaction(s): *Unknown  Patient has a Non-specific antibody. Blood Product orders may be delayed.  Draw one red top and two purple top tubes for ALL Type and Screen/ Type and Crossmatch orders.      Levofloxacin Unknown, Muscle Pain (Myalgia) and Other (See Comments)     Comment: myalgias, Description:   Pt prescribed ciprofloxacin in Jan 2018 (no rxn documented)  Myalgias       Morphine Other (See Comments)     hallucinations     Nitrofurantoin Nausea and Vomiting and Unknown     Norvasc [Amlodipine Besylate]      Leg swelling       Quinapril Other (See Comments) and Unknown     Hypertension. 3.29.18 - Takes and tolerates lisinopril  Patient reports HTN with as reaction to this medication         Problem List:    Patient Active Problem List    Diagnosis Date Noted     Shortness of breath " "2019     Priority: Medium     Health Care Home 2019     Priority: Medium     [facility] Care Coordinator  Mirian Weldon MA, LSW  [phone]    [facility] CARE PLAN SUMMARY    Member Name:  Jazmin Clifton    Address:  [facility] [place]  [id number] [place] [address] [place] [address] [address] Phone: [phone] (home)    :  1932 Date of Assessment: 19  Change in condition visit    Health Plan:  [employer]  Health Plan Number: [id number] Medical Assistance Number: [id number]  Financial Worker:  [facility]   Case #:     [facility] Care Coordinator:  Mirian Weldon MA, LSW CC Phone:  [phone]  CC Fax:  [phone]   FVP Enrollment Date: 19 Case Mix:  D  Rate Cell:  E  Waiver Type: EW     Primary Emergency Contact:  Chi Clifton  Address: [id number] [place] [id number]  Mobile Phone: [phone]  Relation: Son    Secondary Emergency Contact: Lucius Clifton  Address: [address] [address] [address]   Home Phone: [phone]  Work Phone: [phone]  Relation: Son Language:  English  :  No   Health Care Agent/POA:   Advanced Directives/Living Will:     Primary Care Clinic/Phone/Fax:  [facility]/(p) [phone], (f) [phone] Primary Dx:  COPD J44.9  Secondary Dx: DDD M51.36  Cognitive Impairment G31.84   Primary Physician:  Junie Estrella   Height:  5' 3\"  Weight:  203 lbs   Specialty Physician:    Audiologist:     Eye Care Provider:   Dental Care Provider:  [facility]   [facility] [phone]   Other:        [facility] CURRENT SERVICES SUMMARY  Equipment owned/DME history:  Member son purchased electric wheelchair for member;    Member has scooter, lift chair, 3 wheeled walker   SERVICE TYPE/PROVIDER NAME/PHONE AUTH DATE FREQUENCY Units OR $ Amt DESCRIPTION   Medical Transportation: [facility] [phone]  Fax:  19 thru 3-30-20 review annually/PRN  as needed     Assisted Living: " [facility] (Assisted Living) [phone] 24 hr [facility]  Fax:  4-1-19 thru 3-30-20 review annually/PRN daily See RS/AL Tool      Supplies: [facility] [phone]  Fax:  4-1-19 thru 3-30-20 review annually/PRN   monthly 1 pull up per day   1 liner per day  XL pull ups     Regular liners      Abraham ESPARZA    [phone]   5-1-19 thru 3-30-20  as needed                      Morbid obesity (H) 06/19/2018     Priority: Medium     Mild cognitive impairment 09/22/2017     Priority: Medium     S/P carotid endarterectomy 09/06/2017     Priority: Medium     Underwent for symptomatic right carotid artery stenosis        Carotid stenosis, symptomatic w/o infarct, right 08/31/2017     Priority: Medium     Thyroid nodule 08/23/2017     Priority: Medium     Trigger point of extremity 08/23/2017     Priority: Medium     Trochanteric bursitis of right hip 08/23/2017     Priority: Medium     CKD (chronic kidney disease) stage 3, GFR 30-59 ml/min (H) 08/16/2017     Priority: Medium     Transient cerebral ischemia, unspecified type 08/01/2017     Priority: Medium     CVA (cerebral vascular accident) (H) 07/26/2017     Priority: Medium     Chronic obstructive pulmonary disease, unspecified COPD type (H) 07/25/2017     Priority: Medium     Generalized muscle weakness 07/25/2017     Priority: Medium     Dizziness 07/25/2017     Priority: Medium     Osteoarthritis of right hip, unspecified osteoarthritis type 07/25/2017     Priority: Medium     Hip joint replacement status 04/18/2012     Priority: Medium     Osteoarthritis 04/18/2012     Priority: Medium     DDD (degenerative disc disease), lumbar 04/18/2012     Priority: Medium     Mild major depression (H) 04/18/2012     Priority: Medium     Incontinence of urine 04/18/2012     Priority: Medium     Osteoporosis 10/25/2011     Priority: Medium     S/P laminectomy 10/25/2011     Priority: Medium     CARDIOVASCULAR SCREENING; LDL GOAL LESS THAN 100  10/31/2010     Priority: Medium     CHF (congestive heart failure) (H) 09/17/2008     Priority: Medium     Diastolic disfunction - ECHP 2008       Restrictive lung disease 09/17/2008     Priority: Medium     PFT 4/2008       Renovascular hypertension 11/09/2006     Priority: Medium     Problem list name updated by automated process. Provider to review       Benign neoplasm of colon 10/04/2006     Priority: Medium     Angiodysplasia - due for repeat 10 years.  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 NO edema. Continue current Therapy.HYPERTENSION: Dynacirc CR 10mg daily initiated today. Will continue adjusting blood pressure medicatins as needed until readings at target.VITAMIN D  DEFICIENCY - Completed therapy, discontinue ergocalciferol.ANEMIA, EPO DEFICIENCY - Hgb 10.2. Not on EPO. Continue observation for now. Recnet Hgb drop due to lumbar laminectomy.  Problem list name updated by automated process. Provider to review       Essential hypertension, benign 05/01/2006     Priority: Medium     Atherosclerosis of renal artery (H)      Priority: Medium     Left renal artery stenosis       Esophageal reflux      Priority: Medium     Gastroesophageal Reflux Disease       Cerebral aneurysm, nonruptured      Priority: Medium     Cerebral Aneurysm - unchanged on rcent MRI.  Seen by neurosurg.  [place] she should have follow up MRA every 2 years (due 2010)       Other specified cardiac dysrhythmias(427.89)      Priority: Medium     Bradycardia, improved on lower dose beta blockers           Past Medical History:    Past Medical History:   Diagnosis  Date     ABDOMINAL PAIN GENERALIZED 3/15/2006     Abdominal pain, generalized 3/15/2006     Atherosclerosis of renal artery (H)      BENIGN HYPERTENSION 5/1/2006     Benign neoplasm of scalp and skin of neck      Cerebral aneurysm, nonruptured      Depressive disorder, not elsewhere classified      Esophageal reflux      Female stress incontinence      Gastrointestinal malfunction arising from mental factors      Generalized osteoarthrosis, unspecified site      Herpes zoster dermatitis of eyelid      Insomnia, unspecified      Lumbago      Other chest pain      Other specified cardiac dysrhythmias(427.89)      Rectocele      Unspecified disorder of kidney and ureter      Unspecified essential hypertension        Past Surgical History:    Past Surgical History:   Procedure Laterality Date     CHOLECYSTECTOMY, LAPOROSCOPIC  1997    Cholecystectomy, Laparoscopic     ENDARTERECTOMY CAROTID Right 8/31/2017    Procedure: ENDARTERECTOMY CAROTID;  RIGHT CAROTID ENDARTERECTOMY WITH EEG;  Surgeon: Hilario Parry MD;  Location: [facility] OR     HYSTERECTOMY, RAYMOND  1982    oophorectomy,RAYMOND     SURGICAL HISTORY OF -       Laminectomy x 3     SURGICAL HISTORY OF -       MCA Aneurysm repair     SURGICAL HISTORY OF -   1996    Bladder suspension (Bladder Repair x2)     SURGICAL HISTORY OF -       Bilateral Hand Surgeries for Arthritis x2     SURGICAL HISTORY OF -       Repair of a Cerebral Aneurysm       Family History:    Family History   Problem Relation Age of Onset     Cancer Mother         stomache     Cerebrovascular Disease Father      Heart Disease Father      Cancer Brother         lymphoma     Eye Disorder Son      Asthma Son      Diabetes Son      Gastrointestinal Disease Son         no control over bladder or bowels     C.A.D. No family hx of      Hypertension No family hx of      Breast Cancer No family hx of      Cancer - colorectal No family hx of      Prostate Cancer No family hx of        Social History:  Marital  "Status:   [5]  Social History     Tobacco Use     Smoking status: Former Smoker     Last attempt to quit: 1992     Years since quittin.3   Substance Use Topics     Alcohol use: Yes     Comment: wine occas.     Drug use: No        Medications:      ACETAMINOPHEN EXTRA STRENGTH 500 MG tablet   Alendronate Sodium (FOSAMAX PO)   ASPIRIN LOW DOSE 81 MG chewable tablet   atorvastatin (LIPITOR) 40 MG tablet   calcium carbonate (TUMS) 500 MG chewable tablet   calcium carbonate-vitamin D 600-200 MG-UNIT TABS   DONEPEZIL HCL PO   DULoxetine (CYMBALTA) 30 MG capsule   FEROSUL 325 (65 Fe) MG tablet   fluticasone (FLONASE) 50 MCG/ACT nasal spray   fluticasone-vilanterol (BREO ELLIPTA) 100-25 MCG/INH oral inhaler   furosemide (LASIX) 40 MG tablet   gabapentin (NEURONTIN) 100 MG capsule   guaiFENesin (MUCINEX) 600 MG 12 hr tablet   isosorbide mononitrate (IMDUR) 30 MG 24 hr tablet   isradipine (DYNACIRC) 5 MG capsule   levalbuterol (XOPENEX) 1.25 MG/3ML neb solution   Lidocaine (LIDOCARE) 4 % Patch   lisinopril (PRINIVIL/ZESTRIL) 20 MG tablet   MAGNESIUM OXIDE PO   memantine (NAMENDA) 5 MG tablet   nystatin (MYCOSTATIN) 998178 UNIT/GM external powder   OMEPRAZOLE PO   oxyCODONE HCl (OXAYDO) 5 MG TABA   senna-docusate (SENOKOT-S/PERICOLACE) 8.6-50 MG tablet   tiZANidine (ZANAFLEX) 2 MG tablet   vitamin C (ASCORBIC ACID) 500 MG tablet   Blood Glucose Monitoring Suppl CORY   Menthol, Topical Analgesic, (BIOFREEZE) 4 % GEL         Review of Systems all other systems reviewed and are negative.    Physical Exam   BP: 111/69  Pulse: 64  Heart Rate: 64  Temp: 98.1  F (36.7  C)  Resp: 18  Height: 160 cm (5' 3\")  Weight: 90.3 kg (199 lb)  SpO2: 93 %      Physical Exam General alert cooperative female in moderate distress due to her back and hip pain.  She is doing some pursed lip breathing.  She is able speak in complete sentences.  Neck reveals no JVD.  Lungs reveal very poor air movement with basilar crackles.  No active " wheezing.  Cardiac auscultation reveals distant heart sounds and I do not appreciate a murmur but her distant heart sounds make this difficult.  Back reveals no CVA tenderness.  Abdomen is obese with active bowel sounds.  On palpation she is diffusely tender but more so in the midepigastrium and left lower quadrants.  She has voluntary guarding but no rebound.  She has pitting edema in her lower extremities.  She has compressive wraps to her knees bilaterally.  Negative Homans.    ED Course        Procedures               EKG Interpretation:      Interpreted by Krishna Zapata  Time reviewed :13:50  Symptoms at time of EKG: Generalized weakness  Rhythm: normal sinus   Rate: Normal  Axis: Normal  Ectopy: none  Conduction: nonspecific interventricular conduction block  ST Segments/ T Waves: No acute ischemic changes  Q Waves: none  Comparison to prior: Unchanged from 2/19    Clinical Impression: normal EKG                Critical Care time:  none               Results for orders placed or performed during the hospital encounter of 05/10/19 (from the past 24 hour(s))   CBC with platelets differential   Result Value Ref Range    WBC 7.8 4.0 - 11.0 10e9/L    RBC Count 4.19 3.8 - 5.2 10e12/L    Hemoglobin 11.9 11.7 - 15.7 g/dL    Hematocrit 39.0 35.0 - 47.0 %    MCV 93 78 - 100 fl    MCH 28.4 26.5 - 33.0 pg    MCHC 30.5 (L) 31.5 - 36.5 g/dL    RDW 15.4 (H) 10.0 - 15.0 %    Platelet Count 167 150 - 450 10e9/L    Diff Method Automated Method     % Neutrophils 74.6 %    % Lymphocytes 13.4 %    % Monocytes 8.9 %    % Eosinophils 2.4 %    % Basophils 0.4 %    % Immature Granulocytes 0.3 %    Nucleated RBCs 0 0 /100    Absolute Neutrophil 5.8 1.6 - 8.3 10e9/L    Absolute Lymphocytes 1.0 0.8 - 5.3 10e9/L    Absolute Monocytes 0.7 0.0 - 1.3 10e9/L    Absolute Eosinophils 0.2 0.0 - 0.7 10e9/L    Absolute Basophils 0.0 0.0 - 0.2 10e9/L    Abs Immature Granulocytes 0.0 0 - 0.4 10e9/L    Absolute Nucleated RBC 0.0    INR   Result Value  Ref Range    INR 1.02 0.86 - 1.14   Comprehensive metabolic panel   Result Value Ref Range    Sodium 143 133 - 144 mmol/L    Potassium 3.8 3.4 - 5.3 mmol/L    Chloride 105 94 - 109 mmol/L    Carbon Dioxide 33 (H) 20 - 32 mmol/L    Anion Gap 5 3 - 14 mmol/L    Glucose 112 (H) 70 - 99 mg/dL    Urea Nitrogen 36 (H) 7 - 30 mg/dL    Creatinine 1.43 (H) 0.52 - 1.04 mg/dL    GFR Estimate 33 (L) >60 mL/min/[1.73_m2]    GFR Estimate If Black 38 (L) >60 mL/min/[1.73_m2]    Calcium 9.6 8.5 - 10.1 mg/dL    Bilirubin Total 0.4 0.2 - 1.3 mg/dL    Albumin 3.2 (L) 3.4 - 5.0 g/dL    Protein Total 6.0 (L) 6.8 - 8.8 g/dL    Alkaline Phosphatase 79 40 - 150 U/L    ALT 13 0 - 50 U/L    AST 11 0 - 45 U/L   Lipase   Result Value Ref Range    Lipase 112 73 - 393 U/L   Lactic acid whole blood   Result Value Ref Range    Lactic Acid 1.3 0.7 - 2.0 mmol/L   Troponin I   Result Value Ref Range    Troponin I ES <0.015 0.000 - 0.045 ug/L   TSH with free T4 reflex   Result Value Ref Range    TSH 0.51 0.40 - 4.00 mU/L   Blood gas venous   Result Value Ref Range    Ph Venous 7.35 7.32 - 7.43 pH    PCO2 Venous 59 (H) 40 - 50 mm Hg    PO2 Venous 29 25 - 47 mm Hg    Bicarbonate Venous 32 (H) 21 - 28 mmol/L    Base Excess Venous 5.1 mmol/L    FIO2 room air    Nt probnp inpatient (BNP)   Result Value Ref Range    N-Terminal Pro BNP Inpatient 216 0 - 1,800 pg/mL   XR Chest Port 1 View    Narrative    CHEST ONE VIEW PORTABLE  5/10/2019 12:06 PM     HISTORY: Dyspnea and hypoxia.     COMPARISON: 2/19/2019    FINDINGS: Suboptimal inspiration with hypoventilatory changes. Aortic  calcification and tortuosity. Asymmetric elevation of the right  hemidiaphragm again seen. Mild bilateral atelectasis. Superior  translation of the right humeral head, presumably related to rotator  cuff pathology.      Impression    IMPRESSION:  Mild left basilar atelectasis versus infiltrate.    MAYDA GOMEZ MD   UA reflex to Microscopic   Result Value Ref Range    Color Urine Yellow      Appearance Urine Slightly Cloudy     Glucose Urine Negative NEG^Negative mg/dL    Bilirubin Urine Negative NEG^Negative    Ketones Urine Negative NEG^Negative mg/dL    Specific Gravity Urine 1.014 1.003 - 1.035    Blood Urine Large (A) NEG^Negative    pH Urine 5.0 5.0 - 7.0 pH    Protein Albumin Urine Negative NEG^Negative mg/dL    Urobilinogen mg/dL 0.0 0.0 - 2.0 mg/dL    Nitrite Urine Negative NEG^Negative    Leukocyte Esterase Urine Small (A) NEG^Negative    Source Unspecified Urine     RBC Urine >182 (H) 0 - 2 /HPF    WBC Urine 46 (H) 0 - 5 /HPF    Bacteria Urine Few (A) NEG^Negative /HPF    Hyaline Casts 1 0 - 2 /LPF       Medications   0.9% sodium chloride BOLUS (0 mLs Intravenous Stopped 5/10/19 1316)     Followed by   sodium chloride 0.9% infusion (has no administration in time range)   azithromycin (ZITHROMAX) tablet 500 mg (has no administration in time range)   cefTRIAXone in d5w (ROCEPHIN) intermittent infusion 1 g (has no administration in time range)   pantoprazole (PROTONIX) 40 mg IV push injection (40 mg Intravenous Given 5/10/19 1123)   methylPREDNISolone sodium succinate (solu-MEDROL) injection 125 mg (125 mg Intravenous Given 5/10/19 1121)   ipratropium - albuterol 0.5 mg/2.5 mg/3 mL (DUONEB) neb solution 3 mL (3 mLs Nebulization Given 5/10/19 1115)       Assessments & Plan (with Medical Decision Making)   Jazmin Clifton is a 86 year old female who presents from the [facility] with concerns for low blood pressure and low O2 saturation.  Occupational therapist was evaluating the patient for ongoing chronic back and shoulder pain.  Noted to be hypotensive and having low sats which did not improve with O2.  Her son who does present with her states she had pneumonia in the past but has not had anything recently.  She does have significant chronic problems including her spinal stenosis and chronic shoulder issues.  She also has coronary artery disease, CHF, hypertension, COPD, anxiety, and  "previous stroke.  Patient is on multiple medicines and did receive acetaminophen, aspirin, Breo inhaler, duloxetine, iron tablet, fluconazole nasal spray, Lasix, gabapentin, isosorbide,isradipine, lisinopril, Mucinex, tizanidine and oxycodone.  Records reveal that before her a.m. meds her blood pressure was 183/84.  When the OT provider visited her blood pressure was 84 systolically, pulse was 78 and O2 sat was 87%.  After she did some pursed lip breathing, was given some fluids, her blood pressure was up to 88/48 and O2 sat was 92%.  Here in the department patient states she does feel somewhat weak.  She also had some rectal bleeding yesterday with some stringy clots.  Apparently had this years ago and had endoscopy it sounds like a procedure for bleeding.  In the department patient just complains of generalized weakness and her ongoing issues with shoulder, back, and hip pain.  She states that that always happens in the morning when she takes her meds.  She currently denies headache or neck pain.  She feels generally weak and has no focal motor weakness.  She denies any chest pain and does not feel short of breath.  She is taking shallow respirations but states that is normal for her.  She has not had a productive cough.  She has abdominal pain but states that is been going on for a long time.  She has had both upper endoscopy and colonoscopy.  Records show this was done back in 2006.  The upper endoscopy did show a duodenal polyp otherwise no active bleeding or abnormality.  Colonoscopy revealed few nonbleeding colonic angioma ectasias.  Biopsies were obtained and were described as normal appearing colonic mucosa.  Patient states she has intermittent nausea but that \"happens all the time\".  He denies nausea currently.  She is on omeprazole for acid issues. Patient did have blood test done yesterday which showed a creatinine of 1.15 and a BUN of 34.  Her glucose was 146 .  There is no significant change from one " done 5/2/2019.  5/2 she did have a hemoglobin obtained and it was 11.9 which was decreased from 13 down on 4/4/19.  Patient has chronic peripheral edema.  She wears compressive stockings and they are placed on her today.  Her exam was positive for poor air movement throughout all lungs with basilar crackles.  No active wheezing.  She had a diffusely tender abdomen but more so in the midepigastrium left lower quadrant.  When nursing staff attempted to put in Maldonado catheter they had difficulty doing so and noted some blood in her pad.  Exam she did have what looked like possible uterine prolapse.  No blood in the rectum.  Hemoglobin is down about a gram from just over a month ago.  Chest x-ray showed poor inspiratory effort with possible atelectasis or infiltrate left base.  Suspect the former as she has no fever, normal white count and no productive cough.  Patient's blood work showed a normal proBNP so doubt congestive failure contributing to her shortness of breath.  Venous blood gas did show CO2 retention so I suspect she has COPD and this was causing her respiratory symptoms today.  Her EKG does not show any acute ischemic changes compared to previous.  In addition her troponin was normal so doubt significant cardiac cause for her symptoms.  proBNP was normal so doubt congestive failure causing symptoms.  Her thyroid screen was normal and her hemoglobin is stable so doubt this is the cause of her fatigue. At this point patient be admitted for COPD exacerbation and hypoxia.  She admitted for O2 therapy, nebulization, IV steroids, and after consultation with hospitalist we will add Zithromax to cover for atypical with the possibility of a left lower lobe infiltrate.  Urine is pending at time of dictation and if positive we will cover more broadly.  Dr. Blackwood from the hospital service was apprised and will follow on admission  I have reviewed the nursing notes.    I have reviewed the findings, diagnosis, plan and  need for follow up with the patient.          Medication List      There are no discharge medications for this visit.         Final diagnoses:   COPD exacerbation (H)   Hypoxia   Chronic low back pain, unspecified back pain laterality, with sciatica presence unspecified   Chronic hip pain, unspecified laterality   Acute cystitis without hematuria       5/10/2019   [facility] EMERGENCY DEPARTMENT     Krishna Zapata MD  05/10/19 2545

## 2019-05-10 NOTE — H&P
[facility] History and Physical    Jazmin Clifton MRN# [id number]   Age: 86 year old YOB: 1932     Date of Admission:  5/10/2019    Home clinic: [facility]  Primary care provider: Lincoln Brady          Chief Complaint:   Low o2 sats/ BP    History is obtained from the patient, electronic health record and emergency department physician          History of Present Illness:   This patient is a 86 year old  female with a significant past medical history of HTN/ copd who presents with low o2 sats and low BP. Pt  Says she has been feeling the same-NO sx of sob, cough, cp. She noticed some blood in toilet and on wiping-but denied lightheadedness. She had similar sx in past. She  /84 before meds this AM--after meds given-she was sitting and OT was trying to work with her-her BP was noticed to be 74--later went up to 88/48--o2 sats on deep breathing improved from 87-92 % on RA. Again , pt had NPO sx.           Past Medical History:     Patient Active Problem List    Diagnosis Date Noted     Shortness of breath 2019     Priority: [facility]     Health Care Home 2019     Priority: [facility] Care Coordinator  Mirian Weldon MA, LSW  [phone]    [facility] CARE PLAN SUMMARY    Member Name:  Jazmin Clifton    Address:  [address] [address] Phone: [phone] (home)    :  1932 Date of Assessment: 19  Change in condition visit    Health Plan:  [facility]  Health Plan Number: [id number] Medical Assistance Number: [id number]  Financial Worker:  [facility]   Case #:     FVP Care Coordinator:  Mirian Weldon MA, LSW CC Phone:  [phone]  CC Fax:  [phone]   FVP Enrollment Date: 19 Case Mix:  D  Rate Cell:  E  Waiver Type: EW     Primary Emergency Contact:  Chi Clifton  Address: [address] [place] [address]  Mobile Phone: [phone]  Relation:  "Son    Secondary Emergency Contact: Lucius Clifton  Address: [address] [place] [address]   Home Phone: [phone]  Work Phone: [phone]  Relation: Son Language:  English  :  No   Health Care Agent/POA:   Advanced Directives/Living Will:     Primary Care Clinic/Phone/Fax:  [facility]/(p) [phone], (f) [phone] Primary Dx:  COPD J44.9  Secondary Dx: DDD M51.36  Cognitive Impairment G31.84   Primary Physician:  Junie Estrella   Height:  5' 3\"  Weight:  203 lbs   Specialty Physician:    Audiologist:     Eye Care Provider:   Dental Care Provider:  [facility]   [facility] [phone]   Other:        [facility] CURRENT SERVICES SUMMARY  Equipment owned/DME history:  Member son purchased electric wheelchair for member;    Member has scooter, lift chair, 3 wheeled walker   SERVICE TYPE/PROVIDER NAME/PHONE AUTH DATE FREQUENCY Units OR $ Amt DESCRIPTION   Medical Transportation: [facility] [phone]  Fax:  4-1-19 thru 3-30-20 review annually/PRN  as needed     Assisted Living: [facility]) [phone] 24 hr [facility]  Fax:  4-1-19 thru 3-30-20 review annually/PRN daily See RS/AL Tool      Supplies: [employer] [facility] [phone]  Fax:  4-1-19 thru 3-30-20 review annually/PRN   monthly 1 pull up per day   1 liner per day  XL pull ups     Regular liners      Abraham ESPARZA    [phone]   5-1-19 thru 3-30-20  as needed                      Morbid obesity (H) 06/19/2018     Priority: Medium     Mild cognitive impairment 09/22/2017     Priority: Medium     S/P carotid endarterectomy 09/06/2017     Priority: Medium     Underwent for symptomatic right carotid artery stenosis        Carotid stenosis, symptomatic w/o infarct, right 08/31/2017     Priority: Medium     Thyroid nodule 08/23/2017     Priority: Medium     Trigger point of extremity 08/23/2017     Priority: Medium     Trochanteric " bursitis of right hip 08/23/2017     Priority: Medium     CKD (chronic kidney disease) stage 3, GFR 30-59 ml/min (H) 08/16/2017     Priority: Medium     Transient cerebral ischemia, unspecified type 08/01/2017     Priority: Medium     CVA (cerebral vascular accident) (H) 07/26/2017     Priority: Medium     Chronic obstructive pulmonary disease, unspecified COPD type (H) 07/25/2017     Priority: Medium     Generalized muscle weakness 07/25/2017     Priority: Medium     Dizziness 07/25/2017     Priority: Medium     Osteoarthritis of right hip, unspecified osteoarthritis type 07/25/2017     Priority: Medium     Hip joint replacement status 04/18/2012     Priority: Medium     Osteoarthritis 04/18/2012     Priority: Medium     DDD (degenerative disc disease), lumbar 04/18/2012     Priority: Medium     Mild major depression (H) 04/18/2012     Priority: Medium     Incontinence of urine 04/18/2012     Priority: Medium     Osteoporosis 10/25/2011     Priority: Medium     S/P laminectomy 10/25/2011     Priority: Medium     CARDIOVASCULAR SCREENING; LDL GOAL LESS THAN 100 10/31/2010     Priority: Medium     CHF (congestive heart failure) (H) 09/17/2008     Priority: Medium     Diastolic disfunction - ECHP 2008       Restrictive lung disease 09/17/2008     Priority: Medium     PFT 4/2008       Renovascular hypertension 11/09/2006     Priority: Medium     Problem list name updated by automated process. Provider to review       Benign neoplasm of colon 10/04/2006     Priority: Medium     Angiodysplasia - due for repeat 10 years.  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 NO edema. Continue current Therapy.HYPERTENSION: Dynacirc CR 10mg daily initiated today. Will continue adjusting blood pressure medicatins as needed until readings at target.VITAMIN D  DEFICIENCY - Completed therapy, discontinue ergocalciferol.ANEMIA, EPO DEFICIENCY - Hgb 10.2. Not on EPO. Continue observation for now. Recnet Hgb drop due to lumbar laminectomy.  Problem list name updated by automated process. Provider to review       Essential hypertension, benign 05/01/2006     Priority: Medium     Atherosclerosis of renal artery (H)      Priority: Medium     Left renal artery stenosis       Esophageal reflux      Priority: Medium     Gastroesophageal Reflux Disease       Cerebral aneurysm, nonruptured      Priority: Medium     Cerebral Aneurysm - unchanged on rcent MRI.  Seen by neurosurg.  [place] she should have follow up MRA every 2 years (due 2010)       Other specified cardiac dysrhythmias(427.89)      Priority: Medium     Bradycardia, improved on lower dose beta blockers                  Past Surgical History:      Past Surgical History:   Procedure Laterality Date     CHOLECYSTECTOMY, LAPOROSCOPIC  1997    Cholecystectomy, Laparoscopic     ENDARTERECTOMY CAROTID Right 8/31/2017    Procedure: ENDARTERECTOMY CAROTID;  RIGHT CAROTID ENDARTERECTOMY WITH EEG;  Surgeon: Hilario Parry MD;  Location: [facility] OR     HYSTERECTOMY, RAYMOND  1982    oophorectomy,RYAMOND     SURGICAL HISTORY OF -       Laminectomy x 3     SURGICAL HISTORY OF -       MCA Aneurysm repair     SURGICAL HISTORY OF -   1996    Bladder suspension (Bladder Repair x2)     SURGICAL HISTORY OF -       Bilateral Hand Surgeries for Arthritis x2     SURGICAL HISTORY OF -       Repair of a Cerebral Aneurysm             Social History:     Social History     Socioeconomic History     Marital status:      Spouse name: Not on file     Number of children: Not on file     Years of education: Not on file     Highest education level: Not on file    Occupational History     Not on file   Social Needs     Financial resource strain: Not on file     Food insecurity:     Worry: Not on file     Inability: Not on file     Transportation needs:     Medical: Not on file     Non-medical: Not on file   Tobacco Use     Smoking status: Former Smoker     Last attempt to quit: 1992     Years since quittin.3   Substance and Sexual Activity     Alcohol use: Yes     Comment: wine occas.     Drug use: No     Sexual activity: Never   Lifestyle     Physical activity:     Days per week: Not on file     Minutes per session: Not on file     Stress: Not on file   Relationships     Social connections:     Talks on phone: Not on file     Gets together: Not on file     Attends [employer] service: Not on file     Active member of club or organization: Not on file     Attends meetings of clubs or organizations: Not on file     Relationship status: Not on file     Intimate partner violence:     Fear of current or ex partner: Not on file     Emotionally abused: Not on file     Physically abused: Not on file     Forced sexual activity: Not on file   Other Topics Concern     Not on file   Social History Narrative     Not on file             Family History:     Family History   Problem Relation Age of Onset     Cancer Mother         stomache     Cerebrovascular Disease Father      Heart Disease Father      Cancer Brother         lymphoma     Eye Disorder Son      Asthma Son      Diabetes Son      Gastrointestinal Disease Son         no control over bladder or bowels     C.A.D. No family hx of      Hypertension No family hx of      Breast Cancer No family hx of      Cancer - colorectal No family hx of      Prostate Cancer No family hx of              Allergies:     Allergies   Allergen Reactions     Accupril      Ace Inhibitors Unknown     Accupril     Augmentin Unknown     Blood-Group Specific Substance      Other reaction(s): *Unknown  Patient has a Non-specific antibody. Blood  Product orders may be delayed.  Draw one red top and two purple top tubes for ALL Type and Screen/ Type and Crossmatch orders.      Levofloxacin Unknown, Muscle Pain (Myalgia) and Other (See Comments)     Comment: myalgias, Description:   Pt prescribed ciprofloxacin in Jan 2018 (no rxn documented)  Myalgias       Morphine Other (See Comments)     hallucinations     Nitrofurantoin Nausea and Vomiting and Unknown     Norvasc [Amlodipine Besylate]      Leg swelling       Quinapril Other (See Comments) and Unknown     Hypertension. 3.29.18 - Takes and tolerates lisinopril  Patient reports HTN with as reaction to this medication               Medications:     Prior to Admission medications    Medication Sig Last Dose Taking? Auth Provider   ACETAMINOPHEN EXTRA STRENGTH 500 MG tablet TAKE TWO TABLETS (1000MG) BY MOUTH THREE TIMES DAILY 5/10/2019 at am Yes Junie Estrella APRN CNP   Alendronate Sodium (FOSAMAX PO) Take 70 mg by mouth once a week Take 1 tab once weekly (Thursday) on an empty stomach with full glass of water. Remain upright for 30 minutes. Wait 30 minutes before eating 5/9/2019 at am Yes Reported, Patient   ASPIRIN LOW DOSE 81 MG chewable tablet CHEW AND SWALLOW ONE TABLET BY MOUTH ONCE DAILY 5/10/2019 at am Yes Junie Estrella APRN CNP   atorvastatin (LIPITOR) 40 MG tablet TAKE 1 TABLET BY MOUTH ONCE DAILY 5/9/2019 at hs Yes Junie Estrella APRN CNP   calcium carbonate (TUMS) 500 MG chewable tablet Take 1 chew tab by mouth every hour as needed for heartburn Past Week at Unknown time Yes Reported, Patient   calcium carbonate-vitamin D 600-200 MG-UNIT TABS Take 1 tablet by mouth daily 5/10/2019 at am Yes Reported, Patient   DONEPEZIL HCL PO Take 5 mg by mouth daily 5/9/2019 at pm Yes Reported, Patient   DULoxetine (CYMBALTA) 30 MG capsule Take 2 capsules (60 mg) by mouth daily AND 1 capsule (30 mg) At Bedtime. 5/10/2019 at am Yes Junie Estrella APRN CNP FEROSUL  325 (65 Fe) MG tablet TAKE 1 TABLET BY MOUTH TWICE DAILY 5/10/2019 at am Yes Junie Estrella APRN CNP   fluticasone (FLONASE) 50 MCG/ACT nasal spray Spray 1 spray into both nostrils 2 times daily 5/10/2019 at am Yes Reported, Patient   fluticasone-vilanterol (BREO ELLIPTA) 100-25 MCG/INH oral inhaler Inhale 1 puff into the lungs daily 5/10/2019 at am Yes Reported, Patient   furosemide (LASIX) 40 MG tablet Take 1 tablet (40 mg) by mouth 2 times daily 5/10/2019 at am Yes Junie Estrella APRN CNP   gabapentin (NEURONTIN) 100 MG capsule Take 2 capsules (200 mg) by mouth 2 times daily 5/10/2019 at am Yes Junie Estrella APRN CNP   guaiFENesin (MUCINEX) 600 MG 12 hr tablet Take 1 tablet (600 mg) by mouth 2 times daily for 10 days 5/10/2019 at am Yes Junie Estrella APRN CNP   isosorbide mononitrate (IMDUR) 30 MG 24 hr tablet TAKE 1 TABLET BY MOUTH ONCE DAILY 5/10/2019 at am Yes Junie Estrella APRN CNP   isradipine (DYNACIRC) 5 MG capsule Take 5 mg by mouth 2 times daily 5/10/2019 at am Yes Reported, Patient   levalbuterol (XOPENEX) 1.25 MG/3ML neb solution Take 1 ampule by nebulization every 4 hours as needed for shortness of breath / dyspnea or wheezing And every 4 hours PRN Past Week at Unknown time Yes Reported, Patient   Lidocaine (LIDOCARE) 4 % Patch Place 1 patch onto the skin daily as needed To desired area (shoulder or hip). On for 12hrs, off for 12hrs Past Week at Unknown time Yes Reported, Patient   lisinopril (PRINIVIL/ZESTRIL) 20 MG tablet TAKE 1 TABLET BY MOUTH ONCE DAILY 5/10/2019 at am Yes Junie Estrella APRN CNP   MAGNESIUM OXIDE PO Take 250 mg by mouth daily 5/9/2019 at Unknown time Yes Reported, Patient   memantine (NAMENDA) 5 MG tablet TAKE 1 TABLET BY MOUTH ONCE DAILY 5/9/2019 at Unknown time Yes Junie Estrella, APRN CNP   nystatin (MYCOSTATIN) 029075 UNIT/GM external powder APPLY TOPICALLY TO ABDOMEN FOLDS, GROIN, AND  "BREASTS TWICE DAILY AS NEEDED Past Week at Unknown time Yes Junie Estrella APRN CNP   OMEPRAZOLE PO Take 40 mg by mouth every morning 5/10/2019 at am Yes Reported, Patient   oxyCODONE HCl (OXAYDO) 5 MG TABA Take 5 mg by mouth 3 times daily And 1 tablet 2 times a day as needed 5/10/2019 at am Yes Reported, Patient   senna-docusate (SENOKOT-S/PERICOLACE) 8.6-50 MG tablet Take 1 tablet by mouth 2 times daily as needed  5/9/2019 at Unknown time Yes Reported, Patient   tiZANidine (ZANAFLEX) 2 MG tablet TAKE 1 TABLET BY MOUTH THREE TIMES DAILY AS NEEDED 5/10/2019 at am Yes Junie Estrella APRN CNP   vitamin C (ASCORBIC ACID) 500 MG tablet TAKE 1 TABLET BY MOUTH ONCE DAILY 5/9/2019 at Unknown time Yes Junie Estrella APRN CNP   Blood Glucose Monitoring Suppl CORY 2 times daily Call nurse for further directions if blood glucose is less than 80 or greater than 250   Reported, Patient   Menthol, Topical Analgesic, (BIOFREEZE) 4 % GEL Externally apply topically 4 times daily as needed To left shoulder and right hip   Reported, Patient            Review of Systems:   The Review of Systems is negative in ALL other than noted in the HPI          Physical Exam:   Blood pressure 147/75, pulse 69, temperature 98.1  F (36.7  C), temperature source Temporal, resp. rate 18, height 1.6 m (5' 3\"), weight 90.3 kg (199 lb), SpO2 95 %.  GENERAL APPEARANCE: healthy, alert and no distress  EYES: conjunctiva clear, eyes grossly normal  HENT: external ears and nose normal   NECK: supple, no masses or adenopathy  RESP: lungs clear to auscultation - no rales, rhonchi or wheezes  CV: regular rate and rhythm, normal S1 S2, no S3 or S4 and no murmur, click or rub   ABDOMEN: soft, nontender, no HSM or masses and bowel sounds normal  MS: no clubbing, cyanosis; 2+ edema/ lymphedema  SKIN: clear without significant rashes or lesions  NEURO: Normal strength and tone, sensory exam grossly normal, mentation intact and " speech normal         Data:     Lab Results   Component Value Date    WBC 7.8 05/10/2019    HGB 11.9 05/10/2019    HCT 39.0 05/10/2019    MCV 93 05/10/2019     05/10/2019     Lab Results   Component Value Date     05/10/2019    CO2 33 (H) 05/10/2019     Lab Results   Component Value Date    BUN 36 (H) 05/10/2019     No components found for: SEDRATE  No components found for: DDIMER  Lab Results   Component Value Date    BNP 92 03/15/2006     Lab Results   Component Value Date    TSH 0.51 05/10/2019     Lab Results   Component Value Date    TROPONIN <0.04 04/18/2007     UA RESULTS:  Recent Labs   Lab Test 05/10/19  1335   COLOR Yellow   APPEARANCE Slightly Cloudy   URINEGLC Negative   URINEBILI Negative   URINEKETONE Negative   SG 1.014   UBLD Large*   URINEPH 5.0   PROTEIN Negative   NITRITE Negative   LEUKEST Small*   RBCU >182*   WBCU 46*     Liver Function Studies -   Recent Labs   Lab Test 05/10/19  1104   PROTTOTAL 6.0*   ALBUMIN 3.2*   BILITOTAL 0.4   ALKPHOS 79   AST 11   ALT 13       EKG results:  SR-Low voltage-unchanged from before    RADIOLOGY:  cxr-FINDINGS: Suboptimal inspiration with hypoventilatory changes. Aortic  calcification and tortuosity. Asymmetric elevation of the right  hemidiaphragm again seen. Mild bilateral atelectasis. Superior  translation of the right humeral head, presumably related to rotator  cuff pathology.     A/P  HYPOTENSION/ HYPOXIA  One sporadic episode while working with OT and AFTER taking all her BP meds/ narcs and other meds. Very soon BP/ o2 sats normalized with NO intervention except pursed lip breathing.   This is likely due to multiple meds/ COPD/ hypoventitaled lungs due to obesity and poor breathing effort.   -will stop imdur, continue amlodipine/ lisinopril and watch. May need to keep BP on higher end and taper down meds. Pt however likely needs chronic narcs for chronic LBP -needs palliative care to assess goals of care.  Continue o2 as needed-may or may  not need on discharge--could be re-visited with palliative care    BLEEDING IN TOILET  ED exam showed a prolapsed uterus in vagina with excorations. guaic negative. Hg stable.   -f/u GYN    GERD  Continue meds    COPD  Stable  -continue INH/ nebs.     HLD  Continue meds    CKD  Stable    H/O CVA/ TIA  Continue aspirin    CHRONIC LBP  On chronic narcs.    H/O CHRONIC DIASTOLIC CHF  Continue lasix    MORBID OBESITY  Compromising resp effort/ mobility    DVT PROF-mechanical    DISPO  Pt is immobile and does not walk-per pt report. She has multiple irreversible medical problems and needs chronic narcs. Obesity compromising breathing and poor resp effort. Needs palliative care and possible hospice. Will have PT/OT/SE and palliative care eval.     Kristen Lopez MD  [phone]

## 2019-05-10 NOTE — LETTER
Transition Communication Hand-off for Care Transitions to Next Level of Care Provider    Name: Jazmin Clifton  : 1932  MRN #: [id number]  Primary Care Provider: Lincoln Brady  Primary Care MD Name: Taylor Estrella  Primary Clinic: [address] [facility] [address]  Primary Care Clinic Name:  Geriatric  Reason for Hospitalization:  Hypoxia [R09.02]  COPD exacerbation (H) [J44.1]  Acute cystitis without hematuria [N30.00]  Chronic hip pain, unspecified laterality [M25.559, G89.29]  Chronic low back pain, unspecified back pain laterality, with sciatica presence unspecified [M54.5, G89.29]  Admit Date/Time: 5/10/2019 10:22 AM  Discharge Date: 19  Payor Source: Payor: MEDICA / Plan: MEDICA DUAL mymission2 [facility] [employer] / Product Type: HMO /     Readmission Assessment Measure (LINN) Risk Score/category: average           Reason for Communication Hand-off Referral: Fragility    Discharge Plan:home with home care       Concern for non-adherence with plan of care:   Y/N no  Discharge Needs Assessment:  Needs      Most Recent Value   Equipment Currently Used at Home  walker, rolling [also uses and electric chair]          Follow-up plan:    Future Appointments   Date Time Provider Department Center   2019  1:30 PM Mirian Vazquez APRN CNP [facility]       Any outstanding tests or procedures:        Referrals     Future Labs/Procedures    OB/GYN REFERRAL     Comments:    Your provider has referred you to:  [facility]: [facility] [phone]   [email or web address]    Please be aware that coverage of these services is subject to the terms and limitations of your health insurance plan.  Call member services at your health plan with any benefit or coverage questions.      Please bring the following with you to your appointment:    (1) Any X-Rays, CTs or MRIs which have been performed.  Contact the facility where they were done to arrange for   prior to your scheduled appointment.   (2) List of current medications   (3) This referral request   (4) Any documents/labs given to you for this referral            Key Recommendations:  Pt will return back to her [facility] today and will start with home care, Chip.     Jazmine JUDGE, [facility], [facility] [phone]      AVS/Discharge Summary is the source of truth; this is a helpful guide for improved communication of patient story

## 2019-05-10 NOTE — PROGRESS NOTES
"[facility] ADMISSION NOTE    Patient admitted to room 2301 at approximately 1615 via cart from emergency room. Patient was accompanied by transport tech.     Verbal SBAR report received from Lynette prior to patient arrival.     Patient trasferred to bed via air juanis. Patient alert and oriented X 3. Pain is not well controlled.  Medication(s) being used: none.  . Admission vital signs: Blood pressure 150/71, pulse 75, temperature 97.4  F (36.3  C), temperature source Oral, resp. rate 18, height 1.6 m (5' 3\"), weight 90.3 kg (199 lb), SpO2 97 %. Patient was oriented to plan of care, call light, bed controls, tv, telephone, bathroom and visiting hours.     Risk Assessment    The following safety risks were identified during admission: fall. Yellow risk band applied: YES.     Skin Initial Assessment    This writer admitted this patient and completed a full skin assessment and Will score in the Adult PCS flowsheet. Appropriate interventions initiated as needed.     Secondary skin check completed by Deidre Solis Risk Assessment  Sensory Perception: 3-->slightly limited  Moisture: 2-->very moist  Activity: 2-->chairfast  Mobility: 2-->very limited  Nutrition: 3-->adequate  Friction and Shear: 2-->potential problem  Will Score: 14  Bed Support Surface: Atmos Air mattress  Reassessed using Bed Algorithm: No  Will Intervention(s) Implemented: antiembolic/splint/device removed for skin assessment, draw sheets    Kena Dao    "

## 2019-05-11 ENCOUNTER — PATIENT OUTREACH (OUTPATIENT)
Dept: GERIATRIC MEDICINE | Facility: CLINIC | Age: 84
End: 2019-05-11

## 2019-05-11 ENCOUNTER — APPOINTMENT (OUTPATIENT)
Dept: OCCUPATIONAL THERAPY | Facility: CLINIC | Age: 84
End: 2019-05-11
Payer: COMMERCIAL

## 2019-05-11 ENCOUNTER — APPOINTMENT (OUTPATIENT)
Dept: PHYSICAL THERAPY | Facility: CLINIC | Age: 84
End: 2019-05-11
Payer: COMMERCIAL

## 2019-05-11 LAB
ANION GAP SERPL CALCULATED.3IONS-SCNC: 8 MMOL/L (ref 3–14)
BUN SERPL-MCNC: 36 MG/DL (ref 7–30)
CALCIUM SERPL-MCNC: 9.7 MG/DL (ref 8.5–10.1)
CHLORIDE SERPL-SCNC: 104 MMOL/L (ref 94–109)
CO2 SERPL-SCNC: 30 MMOL/L (ref 20–32)
CREAT SERPL-MCNC: 1.18 MG/DL (ref 0.52–1.04)
ERYTHROCYTE [DISTWIDTH] IN BLOOD BY AUTOMATED COUNT: 14.9 % (ref 10–15)
GFR SERPL CREATININE-BSD FRML MDRD: 42 ML/MIN/{1.73_M2}
GLUCOSE SERPL-MCNC: 174 MG/DL (ref 70–99)
HCT VFR BLD AUTO: 39.7 % (ref 35–47)
HGB BLD-MCNC: 12.2 G/DL (ref 11.7–15.7)
MCH RBC QN AUTO: 27.9 PG (ref 26.5–33)
MCHC RBC AUTO-ENTMCNC: 30.7 G/DL (ref 31.5–36.5)
MCV RBC AUTO: 91 FL (ref 78–100)
PLATELET # BLD AUTO: 182 10E9/L (ref 150–450)
POTASSIUM SERPL-SCNC: 4.2 MMOL/L (ref 3.4–5.3)
RBC # BLD AUTO: 4.37 10E12/L (ref 3.8–5.2)
SODIUM SERPL-SCNC: 142 MMOL/L (ref 133–144)
WBC # BLD AUTO: 7.4 10E9/L (ref 4–11)

## 2019-05-11 PROCEDURE — 96376 TX/PRO/DX INJ SAME DRUG ADON: CPT

## 2019-05-11 PROCEDURE — 25000132 ZZH RX MED GY IP 250 OP 250 PS 637: Performed by: FAMILY MEDICINE

## 2019-05-11 PROCEDURE — G0378 HOSPITAL OBSERVATION PER HR: HCPCS

## 2019-05-11 PROCEDURE — 99207 ZZC CDG-CODE CATEGORY CHANGED: CPT | Performed by: FAMILY MEDICINE

## 2019-05-11 PROCEDURE — 94640 AIRWAY INHALATION TREATMENT: CPT

## 2019-05-11 PROCEDURE — 25000128 H RX IP 250 OP 636: Performed by: EMERGENCY MEDICINE

## 2019-05-11 PROCEDURE — 97165 OT EVAL LOW COMPLEX 30 MIN: CPT | Mod: GO | Performed by: OCCUPATIONAL THERAPIST

## 2019-05-11 PROCEDURE — 80048 BASIC METABOLIC PNL TOTAL CA: CPT | Performed by: FAMILY MEDICINE

## 2019-05-11 PROCEDURE — 25000125 ZZHC RX 250: Performed by: FAMILY MEDICINE

## 2019-05-11 PROCEDURE — 99225 ZZC SUBSEQUENT OBSERVATION CARE,LEVEL II: CPT | Performed by: FAMILY MEDICINE

## 2019-05-11 PROCEDURE — 97161 PT EVAL LOW COMPLEX 20 MIN: CPT | Mod: GP | Performed by: PHYSICAL THERAPIST

## 2019-05-11 PROCEDURE — 85027 COMPLETE CBC AUTOMATED: CPT | Performed by: FAMILY MEDICINE

## 2019-05-11 PROCEDURE — 25000132 ZZH RX MED GY IP 250 OP 250 PS 637: Performed by: INTERNAL MEDICINE

## 2019-05-11 PROCEDURE — 40000275 ZZH STATISTIC RCP TIME EA 10 MIN

## 2019-05-11 PROCEDURE — 36415 COLL VENOUS BLD VENIPUNCTURE: CPT | Performed by: FAMILY MEDICINE

## 2019-05-11 RX ORDER — PREDNISOLONE 5 MG/1
20 TABLET ORAL DAILY
Status: DISCONTINUED | OUTPATIENT
Start: 2019-05-12 | End: 2019-05-11 | Stop reason: CLARIF

## 2019-05-11 RX ORDER — IPRATROPIUM BROMIDE AND ALBUTEROL SULFATE 2.5; .5 MG/3ML; MG/3ML
3 SOLUTION RESPIRATORY (INHALATION) 4 TIMES DAILY
Status: DISCONTINUED | OUTPATIENT
Start: 2019-05-11 | End: 2019-05-12 | Stop reason: HOSPADM

## 2019-05-11 RX ORDER — PREDNISONE 20 MG/1
20 TABLET ORAL DAILY
Status: DISCONTINUED | OUTPATIENT
Start: 2019-05-12 | End: 2019-05-12 | Stop reason: HOSPADM

## 2019-05-11 RX ADMIN — TIZANIDINE 2 MG: 2 TABLET ORAL at 22:50

## 2019-05-11 RX ADMIN — FLUTICASONE FUROATE AND VILANTEROL TRIFENATATE 1 PUFF: 100; 25 POWDER RESPIRATORY (INHALATION) at 08:34

## 2019-05-11 RX ADMIN — LISINOPRIL 20 MG: 20 TABLET ORAL at 08:36

## 2019-05-11 RX ADMIN — Medication 200 MG: at 08:33

## 2019-05-11 RX ADMIN — Medication 1 MG: at 22:50

## 2019-05-11 RX ADMIN — MICONAZOLE NITRATE: 20 POWDER TOPICAL at 10:18

## 2019-05-11 RX ADMIN — FLUTICASONE PROPIONATE 1 SPRAY: 50 SPRAY, METERED NASAL at 08:34

## 2019-05-11 RX ADMIN — TIZANIDINE 2 MG: 2 TABLET ORAL at 10:55

## 2019-05-11 RX ADMIN — IPRATROPIUM BROMIDE AND ALBUTEROL SULFATE 3 ML: .5; 3 SOLUTION RESPIRATORY (INHALATION) at 17:01

## 2019-05-11 RX ADMIN — MICONAZOLE NITRATE: 20 POWDER TOPICAL at 19:53

## 2019-05-11 RX ADMIN — CEFTRIAXONE SODIUM 1 G: 1 INJECTION, SOLUTION INTRAVENOUS at 15:23

## 2019-05-11 RX ADMIN — OMEPRAZOLE 40 MG: 20 CAPSULE, DELAYED RELEASE ORAL at 08:36

## 2019-05-11 RX ADMIN — FUROSEMIDE 40 MG: 40 TABLET ORAL at 08:36

## 2019-05-11 RX ADMIN — ACETAMINOPHEN 650 MG: 325 TABLET, FILM COATED ORAL at 22:50

## 2019-05-11 RX ADMIN — ATORVASTATIN CALCIUM 40 MG: 20 TABLET, FILM COATED ORAL at 22:32

## 2019-05-11 RX ADMIN — OXYCODONE HYDROCHLORIDE 5 MG: 5 TABLET ORAL at 13:36

## 2019-05-11 RX ADMIN — FLUTICASONE PROPIONATE 1 SPRAY: 50 SPRAY, METERED NASAL at 19:51

## 2019-05-11 RX ADMIN — FELODIPINE 10 MG: 5 TABLET, FILM COATED, EXTENDED RELEASE ORAL at 06:17

## 2019-05-11 RX ADMIN — DONEPEZIL HYDROCHLORIDE 5 MG: 5 TABLET ORAL at 17:34

## 2019-05-11 RX ADMIN — LIDOCAINE 1 PATCH: 560 PATCH PERCUTANEOUS; TOPICAL; TRANSDERMAL at 10:19

## 2019-05-11 RX ADMIN — ASPIRIN 81 MG 81 MG: 81 TABLET ORAL at 08:36

## 2019-05-11 RX ADMIN — METHYLPREDNISOLONE SODIUM SUCCINATE 37.5 MG: 125 INJECTION, POWDER, FOR SOLUTION INTRAMUSCULAR; INTRAVENOUS at 02:50

## 2019-05-11 RX ADMIN — DULOXETINE HYDROCHLORIDE 60 MG: 30 CAPSULE, DELAYED RELEASE ORAL at 08:35

## 2019-05-11 RX ADMIN — GABAPENTIN 200 MG: 100 CAPSULE ORAL at 08:35

## 2019-05-11 RX ADMIN — ACETAMINOPHEN 650 MG: 325 TABLET, FILM COATED ORAL at 04:18

## 2019-05-11 RX ADMIN — OXYCODONE HYDROCHLORIDE 5 MG: 5 TABLET ORAL at 08:35

## 2019-05-11 RX ADMIN — METHYLPREDNISOLONE SODIUM SUCCINATE 37.5 MG: 125 INJECTION, POWDER, FOR SOLUTION INTRAMUSCULAR; INTRAVENOUS at 10:17

## 2019-05-11 RX ADMIN — OXYCODONE HYDROCHLORIDE 5 MG: 5 TABLET ORAL at 19:51

## 2019-05-11 RX ADMIN — FUROSEMIDE 40 MG: 40 TABLET ORAL at 19:51

## 2019-05-11 RX ADMIN — AZITHROMYCIN 250 MG: 250 TABLET, FILM COATED ORAL at 13:36

## 2019-05-11 RX ADMIN — MEMANTINE 5 MG: 5 TABLET ORAL at 08:37

## 2019-05-11 RX ADMIN — GABAPENTIN 200 MG: 100 CAPSULE ORAL at 19:51

## 2019-05-11 ASSESSMENT — PAIN DESCRIPTION - DESCRIPTORS: DESCRIPTORS: CONSTANT

## 2019-05-11 NOTE — PLAN OF CARE
Discharge Planner OT   Patient plan for discharge: TCU  Current status: weakness, pain, skin breakdown  Barriers to return to prior living situation: decreased ability to care for self  Recommendations for discharge: TCU  Rationale for recommendations: Needs increased cares, shows potential to improve strength       Entered by: Josephine Mar 05/11/2019 11:28 AM

## 2019-05-11 NOTE — PROGRESS NOTES
Jazmin Clifton   IP PT Initial Evaluation  05/11/19 0800   Quick Adds   Type of Visit Initial PT Evaluation   Living Environment   Lives With alone   Living Arrangements assisted living   Home Accessibility no concerns   Transportation Anticipated family or friend will provide   Self-Care   Usual Activity Tolerance moderate   Current Activity Tolerance poor   Regular Exercise No   Equipment Currently Used at Home walker, rolling  (also uses and electric chair)   Functional Level Prior   Ambulation 1-->assistive equipment   Transferring 1-->assistive equipment   Toileting 1-->assistive equipment   Bathing 3-->assistive equipment and person   Fall history within last six months no   Which of the above functional risks had a recent onset or change? ambulation;transferring   General Information   Onset of Illness/Injury or Date of Surgery - Date 05/10/19   Referring Physician Mer[place]   Patient/Family Goals Statement get stronger   Pertinent History of Current Problem (include personal factors and/or comorbidities that impact the POC) Per H&P: This patient is a 86 year old  female with a significant past medical history of HTN/ copd who presents with low o2 sats and low BP. Pt  Says she has been feeling the same-NO sx of sob, cough, cp. She noticed some blood in toilet and on wiping-but denied lightheadedness. She had similar sx in past. She  /84 before meds this AM--after meds given-she was sitting and OT was trying to work with her-her BP was noticed to be 74--later went up to 88/48--o2 sats on deep breathing improved from 87-92 % on RA. Again , pt had NPO sx.   Precautions/Limitations fall precautions   Cognitive Status Examination   Orientation orientation to person, place and time   Level of Consciousness alert   Follows Commands and Answers Questions 100% of the time   Personal Safety and Judgment intact   Memory intact   Pain Assessment   Patient Currently in Pain Yes, see Vital Sign flowsheet    Integumentary/Edema   Integumentary/Edema no deficits were identifed   Posture    Posture Kyphosis;Forward head position   Range of Motion (ROM)   ROM Quick Adds No deficits were identified   Strength   Manual Muscle Testing Quick Adds No deficits were identified   Bed Mobility   Bed Mobility Bed mobility skill: Supine to sit;Bed mobility skill: Sit to supine   Bed Mobility Comments Pt is Star with bed mobility   Bed Mobility Skill: Sit to Supine   Level of [place]: Sit/Supine minimum assist (75% patients effort)   Physical Assist/Nonphysical Assist: Sit/Supine set-up required;supervision;verbal cues   Assistive Device: Sit/Supine bed rails   Bed Mobility Skill: Supine to Sit   Level of [place]: Supine/Sit minimum assist (75% patients effort)   Physical Assist/Nonphysical Assist: Supine/Sit set-up required;supervision;verbal cues   Assistive Device: Supine/Sit bed rails   Transfer Skills   Transfer Transfer Skill: Sit to Stand;Transfer Skill:  Stand to Sit   Transfer Comments Pt is CGA with transfers with FWW   Transfer Skill:  Sit to Stand   Level of [place]: Sit/Stand contact guard   Physical Assist/Nonphysical Assist: Sit/Stand set-up required;supervision;verbal cues   Weightbearing Restrictions: Sit/Stand full weight-bearing   Assistive Device for Transfer: Sit/Stand standard walker   Transfer Skill: Stand to Sit   Level of [place]: Stand/Sit contact guard   Physical Assist/Nonphysical Assist: Stand/Sit set-up required;supervision;verbal cues   Weight-Bearing Restrictions: Stand/Sit full weight-bearing   Assistive Device: Stand to Sit standard walker   Gait   Gait Gait Skill   Gait Comments Pt amb 5 feet from chair to bed with FWW CGA with cues for safety and sequencing. Pain with gait and limited distance.   Gait Skills   Level of [place]: Gait contact guard   Physical Assist/Nonphysical Assist: Gait set-up required;verbal cues;supervision   Weight-Bearing Restrictions: Gait full  "weight-bearing   Assistive Device for Transfer: Gait standard walker   Gait Distance chair to bed   Balance   Balance no deficits were identified   Sensory Examination   Sensory Perception no deficits were identified   Coordination   Coordination no deficits were identified   Muscle Tone   Muscle Tone no deficits were identified   General Therapy Interventions   Intervention Comments eval only   Clinical Impression   Criteria for Skilled Therapeutic Intervention evaluation only   PT Diagnosis generalized weakness   Influenced by the following impairments weakness   Functional limitations due to impairments impaired gait, decreased endurances, limited by pain   Clinical Presentation Stable/Uncomplicated   Clinical Presentation Rationale PT judgement.    Clinical Decision Making (Complexity) Low complexity   Therapy Frequency` other (see comments)   Predicted Duration of Therapy Intervention (days/wks) 1 visit   Anticipated Discharge Disposition Transitional Care Facility   Risk & Benefits of therapy have been explained Yes   Patient, Family & other staff in agreement with plan of care Yes   Clinical Impression Comments Pt is a pleasant 85 y/o female presenting to PT with generalized weakness. Upon evaluation she will benefit from TCU stay prior to home due to weakness and impaired transfers.    [facility] [employer]-PAC TM \"6 Clicks\"   2016, Trustees of [facility], under license to [employer].  All rights reserved.   6 Clicks Short Forms Basic Mobility Inpatient Short Form   [facility] AM-PAC  \"6 Clicks\" V.2 Basic Mobility Inpatient Short Form   1. Turning from your back to your side while in a flat bed without using bedrails? 3 - A Little   2. Moving from lying on your back to sitting on the side of a flat bed without using bedrails? 3 - A Little   3. Moving to and from a bed to a chair (including a wheelchair)? 3 - A Little   4. Standing up from a chair using your arms (e.g., wheelchair, or bedside " chair)? 3 - A Little   5. To walk in hospital room? 3 - A Little   6. Climbing 3-5 steps with a railing? 2 - A Lot   Basic Mobility Raw Score (Score out of 24.Lower scores equate to lower levels of function) 17   Total Evaluation Time   Total Evaluation Time (Minutes) 12     Please Contact me with any questions or concerns. Thank you for for patience and cooperation.     Kian Zhang PT, DPT  [employer] Physical Therapist   [facility]  [email or web address]

## 2019-05-11 NOTE — PROGRESS NOTES
Occupational Therapy Evaluation       05/11/19 1100   Quick Adds   Type of Visit Initial Occupational Therapy Evaluation   Living Environment   Lives With alone;facility resident   Living Arrangements assisted living   Functional Level   Prior Functional Level Comment WC for transport out of room.  Walker for short distances in room.  Assist for dressing, AM cares, showers.    General Information   Onset of Illness/Injury or Date of Surgery - Date 05/10/19   Referring Physician [place]   Patient/Family Goals Statement decrease pain, get stronger   Additional Occupational Profile Info/Pertinent History of Current Problem low BP, low O2 at home.  obesity. was followed by homecare   General Observations Initially crying out with pain in bed.  NSG came with meds and heat pack.  Returned later to pt comfortable smiling and happy.   Cognitive Status Examination   Level of Consciousness alert   Pain Assessment   Patient Currently in Pain Yes, see Vital Sign flowsheet   Integumentary/Edema   Integumentary/Edema Comments NA reports some groin area skin breakdown   Mobility   Bed Mobility Comments elevated head of bed, side rail assist, min assist supine to sit   Transfer Skills   Transfer Comments 2 person stand by for safety, Stands with min assist, ambulates bed to chair with walker and 2 person CGA.     Balance   Balance Comments 2 min standing EOB for changing of briefs, leans against bed for balance   Bathing   Level of [place] maximum assist (25% patients effort)   Upper Body Dressing   Level of [place]: Dress Upper Body minimum assist (75% patients effort)   Lower Body Dressing   Level of [place]: Dress Lower Body maximum assist (25% patients effort)   Grooming   Level of [place]: Grooming minimum assist (75% patients effort)   General Therapy Interventions   Planned Therapy Interventions ADL retraining;strengthening   Clinical Impression   Criteria for Skilled Therapeutic Interventions Met yes,  "treatment indicated   OT Diagnosis weakness   Influenced by the following impairments obesity, pain   Clinical Decision Making (Complexity) Low complexity   Therapy Frequency daily   Predicted Duration of Therapy Intervention (days/wks) 3 days   Anticipated Discharge Disposition Transitional Care Facility   Risks and Benefits of Treatment have been explained. Yes   Patient, Family & other staff in agreement with plan of care Yes   Clinical Impression Comments demonstrate functional decline from baseline, needing increased ADL assist.     [facility] AM-PAC TM \"6 Clicks\"   2016, Trustees of [facility], under license to [employer].  All rights reserved.   6 Clicks Short Forms Daily Activity Inpatient Short Form   [facility] AM-PAC  \"6 Clicks\" Daily Activity Inpatient Short Form   1. Putting on and taking off regular lower body clothing? 2 - A Lot   2. Bathing (including washing, rinsing, drying)? 2 - A Lot   3. Toileting, which includes using toilet, bedpan or urinal? 2 - A Lot   4. Putting on and taking off regular upper body clothing? 3 - A Little   5. Taking care of personal grooming such as brushing teeth? 3 - A Little   6. Eating meals? 4 - None   Daily Activity Raw Score (Score out of 24.Lower scores equate to lower levels of function) 16   Total Evaluation Time   Total Evaluation Time (Minutes) 30   See Care Plan for Goals.    Josephine Mar OTR/L    "

## 2019-05-11 NOTE — PLAN OF CARE
"Patient has scant blood in brief. Had one medium BM. Up with assist x 2. Was very uncomfortable due to back and hip pain, so she spent half the night in the recliner. Aqua K heating pad provides some pain relief. /79   Pulse 74   Temp 98.7  F (37.1  C) (Oral)   Resp 18   Ht 1.6 m (5' 3\")   Wt 90.3 kg (199 lb)   SpO2 92%   BMI 35.25 kg/m      "

## 2019-05-11 NOTE — PROGRESS NOTES
TRANSITIONS OF CARE (AJAY) LOG   AJAY tasks should be completed by the CC within one (1) business day of notification of each transition. Follow up contact with member is required after return to their usual care setting.  Note:  If CC finds out about the transitions fifteen (15) days or more after the member has returned to their usual care setting, no AJAY log is needed. However, the CC should check in with the member to discuss the transition process, any changes needed to the care plan and document it in a case note.    Member Name:  Jazmin Clifton [facility] [place] [facility] Name:  [employer] Member ID#: [id number]   Product: [facility] Care Coordinator Contact:  Mirian Weldon MA, [facility] Agency/[facility]/Care System: [facility]   Transition Communication Actions from Care Management Contact   Transition #1   Notification Date: 5-11-19 Transition Date:   5-11-19 Transition From: Assisted Living, [facility]      Is this the member s usual care setting?               yes Transition To: [facility]    Transition Type:  Unplanned  Reason for Admission/Comments:  COPD exacerbation      Shared CC contact info, care plan/services with receiving setting--Date completed: 5-11-19    Notified PCP of transition--Date completed:  5-11-19     via  EMR   Transition #2   Transition #3  (if applicable)   Notification Date: 5-14-19         Transition To:  Assisted Living, [facility] [address]   Transition Date: 5-12-19     Transition Type:    Planned  Notified PCP -- Date completed: 5-12-19              Shared CC contact info, care plan/services with receiving setting or, if applicable, home care agency--Date completed:  5-14-19  *Complete additional tasks below, if this transition is a return to usual care setting.      Comments:  CC will follow up with RN at the AL if there are any concerns      Notification Date:          Transition To:    Transition Date:              Transition Type:      Notified PCP--Date  completed:          Shared CC contact info, care plan/services with receiving setting or, if applicable, home care agency--Date completed:       *Complete additional tasks below, if this transition is a return to usual care setting.      Comments:       *Complete tasks below when the member is discharging TO their usual care setting within one (1) business day of notification.  For situations where the Care Coordinator is notified of the discharge prior to the date of discharge, the Care Coordinator must follow up with the member or designated representative to confirm that discharge actually occurred and discuss required AJAY tasks as outlined in the AJAY Instructions.  (This includes situations where it may be a  new  usual care setting for the member. (i.e., a community member who decides upon permanent nursing home placement following hospitalization and rehab).    Date completed: 5-14-19  Communicated with member or their designated representative about the following:  care transition process; about changes to the member s health status; plan of care updates; education about transitions and how to prevent unplanned transitions/readmissions  Four Pillars for Optimal Transition:    Check  Yes  - if the member, family member and/or SNF/facility staff manages the following:    If  No  provide explanation in the comments section.          [x]  Yes     []  No     Does the member have a follow-up appointment scheduled with primary care or specialist? (Mental health hospitalizations--the appt. should be w/in 7 days)   []  Yes     []  No     Can the member manage their medications or is there a system in place to manage medications (e.g. home care set-up)?         [x]  Yes     []  No     Can the member verbalize warning signs and symptoms to watch for and how to respond?         [x]  Yes     []  No     Does the member use a Personal Health Care Record?  Check  Yes  if visit summary, discharge summary, and/or healthcare  summary are being used as a PHR.                                                                                                                                                                                    [x] Yes      [] No      Have you updated the member s care plan?  If  No  provide explanation in comments.   Comments:

## 2019-05-11 NOTE — PROGRESS NOTES
[facility] Hospitalist Progress Note           Assessment and Plan:   Assessment & Plan     Hypoxia - suspect mild COPD exacerbation   5/10/19 -- This is likely due to multiple meds/ COPD/ hypoventitaled lungs due to obesity and poor breathing effort.   5/11/2019 --  brief hypoxia yesterday after medications/narcs, improved today after nebs and IV steroids in ER yesterday. Now back on room air.  Will continue prednisone, transition to prednisone 20 mg, continue 4 times daily nebs for now.   Doubt pneumonia with no fever or WBC, but was questionable infiltrate on chest x-ray so was started on azithromycin on admission - will continue for now.       HYPOTENSION  5/10/19 -- One sporadic episode while working with OT and AFTER taking all her BP meds/ narcs and other meds. Very soon BP/ o2 sats normalized with NO intervention except pursed lip breathing.     -will stop imdur, continue amlodipine/ lisinopril and watch. May need to keep BP on higher end and taper down meds. Pt however likely needs chronic narcs for chronic LBP -needs palliative care to assess goals of care.  Continue o2 as needed-may or may not need on discharge--could be re-visited with palliative care     ? UTI  Patient with history of recurrent UTIs, started on rocephin in ER for abnormal UA, although looks like more blood in urine from uterine prolapse as below.  Continue for now, UCx pending.     Uterine prolapse causing slight blood in pads/toilet   5/10/19 -- ED exam showed a prolapsed uterus in vagina with excorations. guaic negative. Hg stable.   5/11/2019 -- no bloody stools, just in pads - unclear if this could be contributing to other symptoms, but doesn't correlate with location of pain - discussed with OBGYN who recommended outpatient follow-up for likely pessary placement.  Recommend follow-up with them at first available appointment on discharge.       GERD  Stable - Continue meds     HLD  Continue meds     CKD  Up a bit on admission,  now improved.      H/O CVA/ TIA  Continue aspirin     CHRONIC LBP  At baseline - continue prior to admission chronic narcs.     H/O CHRONIC DIASTOLIC CHF  Appears stable - Continue lasix     MORBID OBESITY  Compromising resp effort/ mobility     DVT PROF-mechanical    Lines  PIV      Disposition  Improving - hope for return to assisted living tomorrow.              Interval History:   Improving.  Has long-standing left hip pain which fluctuates but remains baseline.  Currently well-controlled.  Has planned follow-up with pain clinic for this tomorrow.  No fever or chills.  No dyspnea at present.  Feels like her strength is much improved today.  Plan was for TCU on discharge, but patient thinks she would be able to go back to her assisted living by tomorrow if she continues to improve.  No new concerns or pain different than long-standing baseline.                Review of Systems:    ROS: 10 point ROS neg other than the symptoms noted above in the HPI.             Medications:   Current active medications and PTA medications reviewed, see medication list for details.            Physical Exam:   Vitals were reviewed  Patient Vitals for the past 24 hrs:   BP Temp Temp src Pulse Heart Rate Resp SpO2   19 1455 159/67 99.1  F (37.3  C) Oral 81 -- 18 93 %   19 1132 169/61 98.8  F (37.1  C) Oral 85 -- 18 93 %   19 0500 185/79 98.7  F (37.1  C) Oral 74 -- 18 92 %   19 0252 154/61 98.1  F (36.7  C) Oral 77 -- 18 92 %   05/10/19 2317 151/54 97.7  F (36.5  C) Oral -- 75 18 93 %   05/10/19 1912 153/56 98.6  F (37  C) Oral 85 -- 18 91 %   05/10/19 1854 -- -- -- -- -- -- 97 %   05/10/19 1615 150/71 97.4  F (36.3  C) Oral 75 -- -- 97 %   05/10/19 1600 147/75 -- -- 69 -- -- 95 %   05/10/19 1545 156/71 -- -- 78 -- -- 95 %   05/10/19 1530 143/72 -- -- 78 -- -- 95 %       Temperatures:  Current - Temp: 99.1  F (37.3  C); Max - Temp  Av.3  F (36.8  C)  Min: 97.4  F (36.3  C)  Max: 99.1  F (37.3   C)  Respiration range: Resp  Av  Min: 18  Max: 18  Pulse range: Pulse  Av  Min: 69  Max: 85  Blood pressure range: Systolic (24hrs), Av , Min:143 , Max:185   ; Diastolic (24hrs), Av, Min:54, Max:79    Pulse oximetry range: SpO2  Av.9 %  Min: 91 %  Max: 97 %  No intake/output data recorded.  No intake or output data in the 24 hours ending 19 1520  EXAM:  General: awake and alert, NAD, oriented x 3  Head: normocephalic  Neck: unremarkable, no lymphadenopathy   HEENT: oropharynx pink and moist    Heart: Regular rate and rhythm, no murmurs, rubs, or gallops  Lungs: clear to auscultation bilaterally with good air movement throughout  Abdomen: soft, non-tender, no masses or organomegaly - is somewhat tender in area of left groin but not in abdomen - patient says this is where her chronic pain is ever since her lymph node removal there - no fluctuance, no swelling in that area.    Extremities: no edema in lower extremities   Skin unremarkable.               Data:     Results for orders placed or performed during the hospital encounter of 05/10/19 (from the past 24 hour(s))   Care Transition RN/SW IP Consult    Narrative    Jazmine Ruiz LICSW     2019  8:32 AM  CARE TRANSITION SOCIAL WORK INITIAL ASSESSMENT:      Met with: Patient.    DATA  Active Problems:    COPD exacerbation (H)    Weakness       Primary Care Clinic Name: [facility]  Primary Care MD Name: Taylor Estrella  Contact information and PCP information verified: Yes      ASSESSMENT  Cognitive Status: awake, alert and oriented.       Resources List: Transitional Care, Skilled Nursing Facility, Home   Care              Description of Support System: Supportive, Involved   Who is your support system?: Children, Facility resident(s)/Staff     Support Assessment: Adequate family and caregiver support,   Adequate social supports   Insurance Concerns: No Insurance issues identified           This writer met with pt introduced  self and role. Discussed   discharge planning and medicare guidelines in regards to home   care and SNF benefits. The patient lives at [facility] (phone: [phone] Fax: [phone]). Patient reports   she receives assistance with wheelchair transport to meals and   dressing. She ambulates with a walker. Followed in the community   by Mirian MESA [phone], [facility], this writer did   notify her of pts admission here.     Pt feels that she might need TCU, she was recently at TCU at   [facility]. Patient was provided with Medicare certified nursing   home list. Pts choices are as follows [place] [facility]   (Phone: [phone] Fax: [phone]). Therapy to assess.             PLAN    TCU?        Jazmine Ruiz MSW, [facility], [facility] [phone]       CBC with platelets   Result Value Ref Range    WBC 7.4 4.0 - 11.0 10e9/L    RBC Count 4.37 3.8 - 5.2 10e12/L    Hemoglobin 12.2 11.7 - 15.7 g/dL    Hematocrit 39.7 35.0 - 47.0 %    MCV 91 78 - 100 fl    MCH 27.9 26.5 - 33.0 pg    MCHC 30.7 (L) 31.5 - 36.5 g/dL    RDW 14.9 10.0 - 15.0 %    Platelet Count 182 150 - 450 10e9/L           Attestation:  I have reviewed today's vital signs, notes, medications, labs and imaging.  Amount of time spent in direct patient care: 30 minutes.     Kenton Blackwood MD, MD

## 2019-05-11 NOTE — CONSULTS
CARE TRANSITION SOCIAL WORK INITIAL ASSESSMENT:      Met with: Patient.    DATA  Active Problems:    COPD exacerbation (H)    Weakness       Primary Care Clinic Name: [facility]  Primary Care MD Name: Taylor Estrella  Contact information and PCP information verified: Yes      ASSESSMENT  Cognitive Status: awake, alert and oriented.       Resources List: Transitional Care, Skilled Nursing Facility, Home Care              Description of Support System: Supportive, Involved   Who is your support system?: Children, Facility resident(s)/Staff   Support Assessment: Adequate family and caregiver support, Adequate social supports   Insurance Concerns: No Insurance issues identified           This writer met with pt introduced self and role. Discussed discharge planning and medicare guidelines in regards to home care and SNF benefits. The patient lives at [facility] (phone: [phone] Fax: [phone]). Patient reports she receives assistance with wheelchair transport to meals and dressing. She ambulates with a walker. Followed in the community by Mirian MESA [phone], [facility], this writer did notify her of pts admission here.     Pt feels that she might need TCU, she was recently at TCU at [facility]. Patient was provided with Medicare certified nursing home list. P[facility] (Phone: [phone] Fax: [phone]). Therapy to assess.             PLAN    TCU?        Jazmine JUDGE, [facility], [facility] [phone]

## 2019-05-11 NOTE — PLAN OF CARE
Severe pain mid morning, in back, right hip and right groin which made patient cry out.  Bathing provided and antifungal powder applied to groin.   Heating pad applied (not on lidoderm patch), lidoderm patch applied to right back, and zaniflex given with relief.  Pain much improved this afternoon and patient states she gets those severe pain spells at home sometimes, but this was worse than the usual spell.  Patient complains of off and on headache which she has had for a few days.  Sat in chair for meals, moving with assist of two, tbelt and walker.  Alert and orientated.

## 2019-05-11 NOTE — PLAN OF CARE
Discharge Planner PT   Patient plan for discharge: Wants TCU  Current status: [facility] with bed mobility, CGA with transfers with FWW, Pt amb 5 feet from chair to bed with FWW CGA with cues for safety and sequencing. Pain with gait and limited distance.  Barriers to return to prior living situation: weakness, limited gait, falls risk  Recommendations for discharge: TCU  Rationale for recommendations: painful and limited gait, decreased level from baseline, PT judgment.        Entered by: Kian Zhang 05/11/2019 10:49 AM     Please Contact me with any questions or concerns. Thank you for for patience and cooperation.     Kian Zhang PT, DPT  [employer] Physical Therapist   [facility]  [email or web address]

## 2019-05-12 VITALS
BODY MASS INDEX: 35.26 KG/M2 | OXYGEN SATURATION: 92 % | SYSTOLIC BLOOD PRESSURE: 146 MMHG | HEART RATE: 70 BPM | HEIGHT: 63 IN | RESPIRATION RATE: 18 BRPM | WEIGHT: 199 LBS | DIASTOLIC BLOOD PRESSURE: 53 MMHG | TEMPERATURE: 98 F

## 2019-05-12 PROCEDURE — 25000131 ZZH RX MED GY IP 250 OP 636 PS 637: Performed by: FAMILY MEDICINE

## 2019-05-12 PROCEDURE — 94640 AIRWAY INHALATION TREATMENT: CPT | Mod: 76

## 2019-05-12 PROCEDURE — 25000125 ZZHC RX 250: Performed by: FAMILY MEDICINE

## 2019-05-12 PROCEDURE — G0378 HOSPITAL OBSERVATION PER HR: HCPCS

## 2019-05-12 PROCEDURE — 96376 TX/PRO/DX INJ SAME DRUG ADON: CPT

## 2019-05-12 PROCEDURE — 25000132 ZZH RX MED GY IP 250 OP 250 PS 637: Performed by: INTERNAL MEDICINE

## 2019-05-12 PROCEDURE — 40000275 ZZH STATISTIC RCP TIME EA 10 MIN

## 2019-05-12 PROCEDURE — 94640 AIRWAY INHALATION TREATMENT: CPT

## 2019-05-12 PROCEDURE — 25000128 H RX IP 250 OP 636: Performed by: EMERGENCY MEDICINE

## 2019-05-12 PROCEDURE — 99217 ZZC OBSERVATION CARE DISCHARGE: CPT | Performed by: FAMILY MEDICINE

## 2019-05-12 RX ORDER — PREDNISONE 20 MG/1
20 TABLET ORAL DAILY
Qty: 3 TABLET | Refills: 0 | Status: SHIPPED | OUTPATIENT
Start: 2019-05-13 | End: 2019-05-17

## 2019-05-12 RX ADMIN — LISINOPRIL 20 MG: 20 TABLET ORAL at 09:17

## 2019-05-12 RX ADMIN — IPRATROPIUM BROMIDE AND ALBUTEROL SULFATE 3 ML: .5; 3 SOLUTION RESPIRATORY (INHALATION) at 11:31

## 2019-05-12 RX ADMIN — PREDNISONE 20 MG: 20 TABLET ORAL at 09:17

## 2019-05-12 RX ADMIN — OMEPRAZOLE 40 MG: 20 CAPSULE, DELAYED RELEASE ORAL at 09:17

## 2019-05-12 RX ADMIN — Medication 200 MG: at 09:17

## 2019-05-12 RX ADMIN — FUROSEMIDE 40 MG: 40 TABLET ORAL at 09:17

## 2019-05-12 RX ADMIN — MICONAZOLE NITRATE: 20 POWDER TOPICAL at 09:18

## 2019-05-12 RX ADMIN — FLUTICASONE FUROATE AND VILANTEROL TRIFENATATE 1 PUFF: 100; 25 POWDER RESPIRATORY (INHALATION) at 09:17

## 2019-05-12 RX ADMIN — TIZANIDINE 2 MG: 2 TABLET ORAL at 10:20

## 2019-05-12 RX ADMIN — GABAPENTIN 200 MG: 100 CAPSULE ORAL at 09:17

## 2019-05-12 RX ADMIN — CEFTRIAXONE SODIUM 1 G: 1 INJECTION, SOLUTION INTRAVENOUS at 12:02

## 2019-05-12 RX ADMIN — FELODIPINE 10 MG: 5 TABLET, FILM COATED, EXTENDED RELEASE ORAL at 06:16

## 2019-05-12 RX ADMIN — ASPIRIN 81 MG 81 MG: 81 TABLET ORAL at 09:17

## 2019-05-12 RX ADMIN — LIDOCAINE 1 PATCH: 560 PATCH PERCUTANEOUS; TOPICAL; TRANSDERMAL at 10:20

## 2019-05-12 RX ADMIN — MEMANTINE 5 MG: 5 TABLET ORAL at 09:17

## 2019-05-12 RX ADMIN — DULOXETINE HYDROCHLORIDE 60 MG: 30 CAPSULE, DELAYED RELEASE ORAL at 09:17

## 2019-05-12 RX ADMIN — OXYCODONE HYDROCHLORIDE 5 MG: 5 TABLET ORAL at 09:17

## 2019-05-12 RX ADMIN — FLUTICASONE PROPIONATE 1 SPRAY: 50 SPRAY, METERED NASAL at 09:17

## 2019-05-12 RX ADMIN — IPRATROPIUM BROMIDE AND ALBUTEROL SULFATE 3 ML: .5; 3 SOLUTION RESPIRATORY (INHALATION) at 07:23

## 2019-05-12 ASSESSMENT — PAIN DESCRIPTION - DESCRIPTORS: DESCRIPTORS: CONSTANT

## 2019-05-12 NOTE — PROGRESS NOTES
Name: Jazmin Clifton    MRN#: [id number]    Reason for Hospitalization: Hypoxia [R09.02]  COPD exacerbation (H) [J44.1]  Acute cystitis without hematuria [N30.00]  Chronic hip pain, unspecified laterality [M25.559, G89.29]  Chronic low back pain, unspecified back pain laterality, with sciatica presence unspecified [M54.5, G89.29]    Discharge Date: 2019    Patient / Family response to discharge plan: This writer met with pt to further discuss discharge planning. Pt tyler snot want to go to the TCU, she feels she is better and will be able to return back to her apt. Pt will most likley be ready to return today. She is agreeable to home care. Patient was provided with Medicare certified nursing home list. Pts choices are as follows [facility] ([phone] Fax: [phone]).  Referral placed.     Pt reports that her son will transport her home.    Other Providers (Care Coordinator, [facility], PCA services etc): Mirian Weldon, she has been notified.     CTS Hand Off Completed: Yes: completed    PAS #: n/a    LINN Score: average    Future Appointments:   Future Appointments   Date Time Provider Department Center   2019  1:30 PM Mirian Vazquez APRN CNP [facility]       Discharge Disposition: home    Discharge Planner   Discharge Plans in progress: [facility]  Barriers to discharge plan: medical stability  Follow up plan: home care       Entered by: Jazmine Ruiz 2019 8:53 AM           Jazmine Ruiz MSW, [facility], [facility] [phone]      HOME CARE HAND OFF  Patient Name: Jazmin Clifton    MRN: [id number]    : 1932    Patient Zip Code: [id number]    Admit Diagnosis: Hypoxia [R09.02]  COPD exacerbation (H) [J44.1]  Acute cystitis without hematuria [N30.00]  Chronic hip pain, unspecified laterality [M25.559, G89.29]  Chronic low back pain, unspecified back pain laterality, with sciatica presence unspecified [M54.5, G89.29]      Services Pt Needs at Home: PT, OT and HHA    Discharge  Support: Family/Friend Support    Living Arrangements: Assisted living     or Address Other Than Pt: No    Wound Care: No    Anticipate DC Date: 5/12/2019

## 2019-05-12 NOTE — PLAN OF CARE
[facility] DISCHARGE NOTE    Patient discharged to assisted living at 1:35 PM via wheel chair. Accompanied by son and staff. Discharge instructions reviewed with patient, opportunity offered to ask questions. Prescriptions filled and sent with patient upon discharge. All belongings sent with patient.    Kena Dao

## 2019-05-12 NOTE — PLAN OF CARE
Lidocaine patch removed and aqua K heating pad applied for the night. Patient changed/repositioned Q2h.  Given PRN tylenol, melatonin and zanaflex at bedtime which seems to be helping patient sleep.

## 2019-05-12 NOTE — DISCHARGE SUMMARY
[facility] Discharge Summary    Jazmin Clifton MRN# [id number]   Age: 86 year old YOB: 1932     Date of Admission:  5/10/2019  Date of Discharge::  5/12/2019  Admitting Physician:  Kristen Lopez MD  Discharge Physician:  Kenton Blackwood MD, MD             Admission Diagnoses:   Hypoxia [R09.02]  COPD exacerbation (H) [J44.1]  Acute cystitis without hematuria [N30.00]  Chronic hip pain, unspecified laterality [M25.559, G89.29]  Chronic low back pain, unspecified back pain laterality, with sciatica presence unspecified [M54.5, G89.29]          Principle Discharge Diagnosis:     Hypoxia - suspect mild COPD exacerbation     See hospital course for further active diagnoses addressed during this admission.            Procedures:   No procedures performed during this admission          Medications Prior to Admission:     Medications Prior to Admission   Medication Sig Dispense Refill Last Dose     ACETAMINOPHEN EXTRA STRENGTH 500 MG tablet TAKE TWO TABLETS (1000MG) BY MOUTH THREE TIMES DAILY 120 tablet 98 5/10/2019 at am     ASPIRIN LOW DOSE 81 MG chewable tablet CHEW AND SWALLOW ONE TABLET BY MOUTH ONCE DAILY 30 tablet 11 5/10/2019 at am     atorvastatin (LIPITOR) 40 MG tablet TAKE 1 TABLET BY MOUTH ONCE DAILY 30 tablet 98 5/9/2019 at hs     calcium carbonate (TUMS) 500 MG chewable tablet Take 1 chew tab by mouth every hour as needed for heartburn   Past Week at Unknown time     calcium carbonate-vitamin D 600-200 MG-UNIT TABS Take 1 tablet by mouth daily   5/10/2019 at am     DONEPEZIL HCL PO Take 5 mg by mouth daily   5/9/2019 at pm     DULoxetine (CYMBALTA) 30 MG capsule Take 2 capsules (60 mg) by mouth daily AND 1 capsule (30 mg) At Bedtime. 90 capsule 11 5/10/2019 at am     FEROSUL 325 (65 Fe) MG tablet TAKE 1 TABLET BY MOUTH TWICE DAILY 60 tablet 98 5/10/2019 at am     fluticasone (FLONASE) 50 MCG/ACT nasal spray Spray 1 spray into both nostrils 2 times daily   5/10/2019 at am      fluticasone-vilanterol (BREO ELLIPTA) 100-25 MCG/INH oral inhaler Inhale 1 puff into the lungs daily   5/10/2019 at am     furosemide (LASIX) 40 MG tablet Take 1 tablet (40 mg) by mouth 2 times daily 60 tablet 11 5/10/2019 at am     gabapentin (NEURONTIN) 100 MG capsule Take 2 capsules (200 mg) by mouth 2 times daily 120 capsule 11 5/10/2019 at am     guaiFENesin (MUCINEX) 600 MG 12 hr tablet Take 1 tablet (600 mg) by mouth 2 times daily for 10 days 20 tablet 0 5/10/2019 at am     isradipine (DYNACIRC) 5 MG capsule Take 5 mg by mouth 2 times daily   5/10/2019 at am     levalbuterol (XOPENEX) 1.25 MG/3ML neb solution Take 1 ampule by nebulization every 4 hours as needed for shortness of breath / dyspnea or wheezing And every 4 hours PRN   Past Week at Unknown time     Lidocaine (LIDOCARE) 4 % Patch Place 1 patch onto the skin daily as needed To desired area (shoulder or hip). On for 12hrs, off for 12hrs   Past Week at Unknown time     lisinopril (PRINIVIL/ZESTRIL) 20 MG tablet TAKE 1 TABLET BY MOUTH ONCE DAILY 30 tablet 98 5/10/2019 at am     MAGNESIUM OXIDE PO Take 250 mg by mouth daily   5/9/2019 at Unknown time     nystatin (MYCOSTATIN) 164160 UNIT/GM external powder APPLY TOPICALLY TO ABDOMEN FOLDS, GROIN, AND BREASTS TWICE DAILY AS NEEDED 60 g 97 Past Week at Unknown time     OMEPRAZOLE PO Take 40 mg by mouth every morning   5/10/2019 at am     oxyCODONE HCl (OXAYDO) 5 MG TABA Take 5 mg by mouth 3 times daily And 1 tablet 2 times a day as needed   5/10/2019 at am     senna-docusate (SENOKOT-S/PERICOLACE) 8.6-50 MG tablet Take 1 tablet by mouth 2 times daily as needed    5/9/2019 at Unknown time     tiZANidine (ZANAFLEX) 2 MG tablet TAKE 1 TABLET BY MOUTH THREE TIMES DAILY AS NEEDED 90 tablet 97 5/10/2019 at am     vitamin C (ASCORBIC ACID) 500 MG tablet TAKE 1 TABLET BY MOUTH ONCE DAILY 30 tablet 98 5/9/2019 at Unknown time     Blood Glucose Monitoring Suppl CORY 2 times daily Call nurse for further directions  if blood glucose is less than 80 or greater than 250   Taking     Menthol, Topical Analgesic, (BIOFREEZE) 4 % GEL Externally apply topically 4 times daily as needed To left shoulder and right hip   Taking     [DISCONTINUED] Alendronate Sodium (FOSAMAX PO) Take 70 mg by mouth once a week Take 1 tab once weekly (Thursday) on an empty stomach with full glass of water. Remain upright for 30 minutes. Wait 30 minutes before eating   5/9/2019 at am     [DISCONTINUED] isosorbide mononitrate (IMDUR) 30 MG 24 hr tablet TAKE 1 TABLET BY MOUTH ONCE DAILY 30 tablet 98 5/10/2019 at am     [DISCONTINUED] memantine (NAMENDA) 5 MG tablet TAKE 1 TABLET BY MOUTH ONCE DAILY 30 tablet 98 5/9/2019 at Unknown time             Discharge Medications:     Current Discharge Medication List      START taking these medications    Details   predniSONE (DELTASONE) 20 MG tablet Take 20 mg by mouth daily for 3 days.  Qty: 3 tablet, Refills: 0    Comments: Please include the quantity of tablets and (strength) per dose in sig.  Associated Diagnoses: COPD exacerbation (H)         CONTINUE these medications which have NOT CHANGED    Details   ACETAMINOPHEN EXTRA STRENGTH 500 MG tablet TAKE TWO TABLETS (1000MG) BY MOUTH THREE TIMES DAILY  Qty: 120 tablet, Refills: 98    Associated Diagnoses: DDD (degenerative disc disease), lumbar      ASPIRIN LOW DOSE 81 MG chewable tablet CHEW AND SWALLOW ONE TABLET BY MOUTH ONCE DAILY  Qty: 30 tablet, Refills: 11    Associated Diagnoses: Congestive heart failure, unspecified HF chronicity, unspecified heart failure type (H)      atorvastatin (LIPITOR) 40 MG tablet TAKE 1 TABLET BY MOUTH ONCE DAILY  Qty: 30 tablet, Refills: 98    Associated Diagnoses: Congestive heart failure, unspecified HF chronicity, unspecified heart failure type (H)      calcium carbonate (TUMS) 500 MG chewable tablet Take 1 chew tab by mouth every hour as needed for heartburn      calcium carbonate-vitamin D 600-200 MG-UNIT TABS Take 1 tablet by  mouth daily      DONEPEZIL HCL PO Take 5 mg by mouth daily      DULoxetine (CYMBALTA) 30 MG capsule Take 2 capsules (60 mg) by mouth daily AND 1 capsule (30 mg) At Bedtime.  Qty: 90 capsule, Refills: 11    Associated Diagnoses: Chronic pain syndrome; Depression with anxiety      FEROSUL 325 (65 Fe) MG tablet TAKE 1 TABLET BY MOUTH TWICE DAILY  Qty: 60 tablet, Refills: 98    Associated Diagnoses: DDD (degenerative disc disease), lumbar      fluticasone (FLONASE) 50 MCG/ACT nasal spray Spray 1 spray into both nostrils 2 times daily      fluticasone-vilanterol (BREO ELLIPTA) 100-25 MCG/INH oral inhaler Inhale 1 puff into the lungs daily      furosemide (LASIX) 40 MG tablet Take 1 tablet (40 mg) by mouth 2 times daily  Qty: 60 tablet, Refills: 11    Associated Diagnoses: Congestive heart failure, unspecified HF chronicity, unspecified heart failure type (H)      gabapentin (NEURONTIN) 100 MG capsule Take 2 capsules (200 mg) by mouth 2 times daily  Qty: 120 capsule, Refills: 11    Associated Diagnoses: DDD (degenerative disc disease), lumbar; Spinal stenosis of lumbar region, unspecified whether neurogenic claudication present      guaiFENesin (MUCINEX) 600 MG 12 hr tablet Take 1 tablet (600 mg) by mouth 2 times daily for 10 days  Qty: 20 tablet, Refills: 0    Associated Diagnoses: Congestion of paranasal sinus      isradipine (DYNACIRC) 5 MG capsule Take 5 mg by mouth 2 times daily      levalbuterol (XOPENEX) 1.25 MG/3ML neb solution Take 1 ampule by nebulization every 4 hours as needed for shortness of breath / dyspnea or wheezing And every 4 hours PRN      Lidocaine (LIDOCARE) 4 % Patch Place 1 patch onto the skin daily as needed To desired area (shoulder or hip). On for 12hrs, off for 12hrs      lisinopril (PRINIVIL/ZESTRIL) 20 MG tablet TAKE 1 TABLET BY MOUTH ONCE DAILY  Qty: 30 tablet, Refills: 98    Associated Diagnoses: Congestive heart failure, unspecified HF chronicity, unspecified heart failure type (H)       MAGNESIUM OXIDE PO Take 250 mg by mouth daily      nystatin (MYCOSTATIN) 195495 UNIT/GM external powder APPLY TOPICALLY TO ABDOMEN FOLDS, GROIN, AND BREASTS TWICE DAILY AS NEEDED  Qty: 60 g, Refills: 97    Associated Diagnoses: Rash      OMEPRAZOLE PO Take 40 mg by mouth every morning      oxyCODONE HCl (OXAYDO) 5 MG TABA Take 5 mg by mouth 3 times daily And 1 tablet 2 times a day as needed      senna-docusate (SENOKOT-S/PERICOLACE) 8.6-50 MG tablet Take 1 tablet by mouth 2 times daily as needed       tiZANidine (ZANAFLEX) 2 MG tablet TAKE 1 TABLET BY MOUTH THREE TIMES DAILY AS NEEDED  Qty: 90 tablet, Refills: 97    Associated Diagnoses: Chronic pain syndrome      vitamin C (ASCORBIC ACID) 500 MG tablet TAKE 1 TABLET BY MOUTH ONCE DAILY  Qty: 30 tablet, Refills: 98    Associated Diagnoses: DDD (degenerative disc disease), lumbar      Blood Glucose Monitoring Suppl CORY 2 times daily Call nurse for further directions if blood glucose is less than 80 or greater than 250      Menthol, Topical Analgesic, (BIOFREEZE) 4 % GEL Externally apply topically 4 times daily as needed To left shoulder and right hip         STOP taking these medications       Alendronate Sodium (FOSAMAX PO) Comments:   Reason for Stopping:         isosorbide mononitrate (IMDUR) 30 MG 24 hr tablet Comments:   Reason for Stopping:         memantine (NAMENDA) 5 MG tablet Comments:   Reason for Stopping:                     Consultations:   No consultations were requested during this admission          Brief History of Illness:     From Admission H+P:   This patient is a 86 year old  female with a significant past medical history of HTN/ copd who presents with low o2 sats and low BP. Pt  Says she has been feeling the same-NO sx of sob, cough, cp. She noticed some blood in toilet and on wiping-but denied lightheadedness. She had similar sx in past. She  /84 before meds this AM--after meds given-she was sitting and OT was trying to work  "with her-her BP was noticed to be 74--later went up to 88/48--o2 sats on deep breathing improved from 87-92 % on RA. Again , pt had NPO sx.            TODAY:     Subjective:  Feels much improved today.  Feels like strength is back to baseline as is breathing.  No wheezing.  Feels like she'd do fine at her assisted living, has been up and independent here.  Pain at long-standing baseline and says she's doing well with this.  No fever or chills.  No other concerns.  Feels ready for discharge.        ROS:   ROS: 10 point ROS neg other than the symptoms noted above in the HPI.     /53   Pulse 70   Temp 98  F (36.7  C) (Oral)   Resp 18   Ht 1.6 m (5' 3\")   Wt 90.3 kg (199 lb)   SpO2 92%   BMI 35.25 kg/m     EXAM:  General: awake and alert, NAD, oriented x 3  Head: normocephalic  Neck: unremarkable, no lymphadenopathy   HEENT: oropharynx pink and moist    Heart: Regular rate and rhythm, no murmurs, rubs, or gallops  Lungs: mildly reduced air movement bilaterally, faint wheezing but improved, otherwise clear to auscultation bilaterally  Abdomen: soft, non-tender, no masses or organomegaly  Extremities: no edema in lower extremities   Skin unremarkable.            Hospital Course:       Hypoxia - suspect mild COPD exacerbation   5/10/19 -- This is likely due to multiple meds/ COPD/ hypoventitaled lungs due to obesity and poor breathing effort.   5/11/2019 --  brief hypoxia yesterday after medications/narcs, improved today after nebs and IV steroids in ER yesterday. Now back on room air.  Will continue prednisone, transition to prednisone 20 mg, continue 4 times daily nebs for now.   Doubt pneumonia with no fever or WBC, but was questionable infiltrate on chest x-ray so was started on azithromycin on admission - will continue for now.     5/12/2019 -- resolved - ok to discharge to complete 3 more days of prednisone, otherwise on prior to admission medications.       Hypotensive episodes after blood pressure " medications in patient with history of hypertension   5/10/19 -- One sporadic episode while working with OT and AFTER taking all her BP meds/ narcs and other meds. Very soon BP normalized with NO intervention except pursed lip breathing.  -will stop imdur, continue other medications and watch. May need to keep BP on higher end and taper down medications  5/12/2019 -- resolved after stopping imdur - I don't see a clear indication for this medication and she's doing well without it - will remain off imdur for now, reassess at follow-up with primary care provider.     ? UTI  Patient with history of recurrent UTIs, started on rocephin in ER for abnormal UA, although looks like more blood in urine from uterine prolapse as below.    Urine culture still pending on discharge, patient asymptomatic and will give 3rd day of rocephin today - 3 days of rocephin should be adequate even if UTI present.  Stopping antibiotics on discharge.      Uterine prolapse causing slight blood in pads/toilet   5/10/19 -- ED exam showed a prolapsed uterus in vagina with excorations. guaic negative. Hg stable.   5/11/2019 -- no bloody stools, just in pads - unclear if this could be contributing to other symptoms, but doesn't correlate with location of pain - discussed with OBGYN who recommended outpatient follow-up for likely pessary placement.  Recommend follow-up with them at first available appointment on discharge.       Recent visit with [facility] pharmacy   5/12/2019 -- had recommended stopping memantine and alendronate, which appears to have been planned but still listed for patient - stopped these 2 medications today- follow-up with primary care provider for further weaning of medications as able per [facility] recommendations.     GERD  Stable - Continue meds     HLD  Continue meds     CKD  Up a bit on admission, now improved.      H/O CVA/ TIA  Continue aspirin     CHRONIC LBP  At baseline - continue prior to admission chronic narcs.     H/O CHRONIC  DIASTOLIC CHF  Appears stable - Continue lasix     MORBID OBESITY  Compromising resp effort/ mobility                        Discharge Instructions and Follow-Up:   Discharge diet: Regular   Discharge activity: Activity as tolerated   Discharge follow-up: Follow up with primary care provider in 7 days, reassess blood pressure and breathing and further assess medications at that time.             Discharge Disposition:   Discharged to assisted living      Attestation:  I have reviewed today's vital signs, notes, medications, labs and imaging.  Amount of time performed on this discharge summary: 60 minutes.    Kenton Blackwood MD, MD

## 2019-05-13 LAB
BACTERIA SPEC CULT: NORMAL
Lab: NORMAL
SPECIMEN SOURCE: NORMAL

## 2019-05-14 ENCOUNTER — OFFICE VISIT (OUTPATIENT)
Dept: PALLIATIVE MEDICINE | Facility: CLINIC | Age: 84
End: 2019-05-14
Payer: COMMERCIAL

## 2019-05-14 VITALS — SYSTOLIC BLOOD PRESSURE: 171 MMHG | DIASTOLIC BLOOD PRESSURE: 85 MMHG | HEART RATE: 73 BPM

## 2019-05-14 DIAGNOSIS — M25.551 PAIN OF RIGHT HIP JOINT PAIN: Primary | ICD-10-CM

## 2019-05-14 DIAGNOSIS — G89.29 CHRONIC BILATERAL LOW BACK PAIN WITHOUT SCIATICA: ICD-10-CM

## 2019-05-14 DIAGNOSIS — M70.61 GREATER TROCHANTERIC BURSITIS OF BOTH HIPS: ICD-10-CM

## 2019-05-14 DIAGNOSIS — M70.62 GREATER TROCHANTERIC BURSITIS OF BOTH HIPS: ICD-10-CM

## 2019-05-14 DIAGNOSIS — G89.4 CHRONIC PAIN SYNDROME: ICD-10-CM

## 2019-05-14 DIAGNOSIS — M54.50 CHRONIC BILATERAL LOW BACK PAIN WITHOUT SCIATICA: ICD-10-CM

## 2019-05-14 DIAGNOSIS — M54.59 LUMBAR FACET JOINT PAIN: ICD-10-CM

## 2019-05-14 DIAGNOSIS — Z98.890 H/O LUMBOSACRAL SPINE SURGERY: ICD-10-CM

## 2019-05-14 PROCEDURE — 99207 ZZC DOWN CODE DUE TO SUBSEQUENT EXAM: CPT | Performed by: NURSE PRACTITIONER

## 2019-05-14 PROCEDURE — 99215 OFFICE O/P EST HI 40 MIN: CPT | Performed by: NURSE PRACTITIONER

## 2019-05-14 ASSESSMENT — PAIN SCALES - GENERAL: PAINLEVEL: MILD PAIN (3)

## 2019-05-14 NOTE — PROGRESS NOTES
"  [facility] Consultation    Date of visit: 5/14/2019    Reason for consultation:    Jazmin Clifton is a 86 year old female who is seen in consultation today at the request of her provider, Junie HERNANDEZ CNP  re: patient's chronic low back pain, multiple previous surgeries most recent lumbar L1-2 bilateral laminectomy and medial facetectomy on 1/9/2019.      Primary Care Provider is Junie Estrella.  Pain medications are being prescribed by Junie HERNANDEZ CNP most recently, but patient has had 10 distinct prescribers in the previous 12 months and used 3 different pharmacies.     Please see the [facility] health questionnaire which the patient completed and reviewed with me in detail.    Chief Complaint:  Low back pain  No chief complaint on file.      Pain history:  Jazmin Clifton is a 86 year old female who first started having problems with pain as follows:     Low back pain  Pain has been persistent for many years and the patient has had seven back surgeries. The  L1-L2 laminectomy done in January 2019 was not helpful and she has had back pain ever since.   -Pain radiates into the right lateral hip and groin.   -The patient had a left hip replacement in the past.   -She denies any radicular pain into the legs, however, she does have edema over the bilateral ankles.   She has a seroma in the right anterior thigh that causes pain when it fills.   -Uterine prolapse causes pain in low back and groin.   -she states that when she walks or puts pressure on the right leg, she feels sharp pressure in right hip and low back.   -Also, the patient experiences frequent bladder infections.   -patient notes that  pain has improved somewhat after ED visit on 5/10/2019 for COPD exacerbation.       Pain rating: intensity ranges from 2/10 to 8/10, and Averages 5/10 on a 0-10 scale.    Describes pain as \"aching, sharp, and shooting.\"  Pain is constant.    Home self care " "includes: Pain medication and muscle relaxants.    Aggravating factors include: Standing up    Relieving factors include: Sitting in a lift chair with heat.        Current pain-related medication treatments include:  -Tylenol Extra Strength 500mg may take 2 tabs TID (somewhat helpful)  -ASA 81mg every day  -Cymbalta 30mg capsules, take 60mg in the AM and 30mg in the PM for daily dose of 90mg/day (somewhat helpful)  -gabapentin 200mg BID (Unsure)  -4% lidocaine patches PRN (helpful)  -oxycodone 5mg take TID and 2 tablets/day PRN max of 5 tablets/day ( 5 tablets/day helpful)  -tizanidine 2mg TID PRN (2 tablets/day helpful)    Other pertinent medications:  -prednisone 20mg for 3 days from 5/13-5/16/2019 (helpful for COPD)  -Senna-docusate PRN     Previous medication treatments included:  OPIATES: Oxycodone (helpful), Norco (not helpful)  NSAIDS: Aleve (helpful)  MUSCLE RELAXANTS: Tizanidine (helpful),   ANTI-MIGRAINE MEDS: None  ANTI-DEPRESSANTS: Cymbalta (helpful),   SLEEP AIDS: None  ANTI-CONVULSANTS: gabapentin (unsure),   TOPICALS: Lidocaine 4% patches (helpful),   Other meds: Tylenol (not helpful), Prednisone       Other treatments have included:  Jazmin Clifton has been seen at a pain clinic in the past.    PT: Tried it, not helpful (rehab PT)  Chiropractic care: Tried it, made it worse  Acupuncture: None  TENs Unit: None    Injections: Tried it, no help    Surgeries: Tried it, helped. Previous surgery was not helpful.        Past Medical History:  Past Medical History:   Diagnosis Date     ABDOMINAL PAIN GENERALIZED 3/15/2006    1 month of abdominal pain that bryn radiates into her back and chest.  Pt was hospitilzed 2x for the pain at [facility] --pt hopsitized for 3 days.  Rcords not ab=vailable.  She was told either \" twisted intestine or blockage of bowel.  Pt feels it is the hiatal hernia.  Pt hospitilized again a second time  A month ago.  Ct scans x 2 showed \" nothing.\"  Pt had a barium and " "xrays.  Pt sa     Abdominal pain, generalized 3/15/2006    1 month of abdominal pain that bryn radiates into her back and chest.  Pt was hospitilzed 2x for the pain at [facility] --pt hopsitized for 3 days.  Rcords not ab=vailable.  She was told either \" twisted intestine or blockage of bowel.  Pt feels it is the hiatal hernia.  Pt hospitilized again a second time  A month ago.  Ct scans x 2 showed \" nothing.\"  Pt had a barium and xrays.  Pt sa     Atherosclerosis of renal artery (H)     Left renal artery stenosis     BENIGN HYPERTENSION 5/1/2006 5/1/2006   Increase catapres to 0.3 mg and decrease clonidine to 0.1 mg daily.  Recheck bp in 1 month.      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     Dyspepsia     Generalized osteoarthrosis, unspecified site     DJD-chronic back pain     Herpes zoster dermatitis of eyelid      Insomnia, unspecified      Lumbago     Chronic back pain     Other chest pain     Atypical Chest Pain     Other specified cardiac dysrhythmias(427.89)     Bradycardia, improved on lower dose beta blockers      Rectocele     Grade 3     Unspecified disorder of kidney and ureter     Renal insufficiency     Unspecified essential hypertension      Past Surgical History:  Past Surgical History:   Procedure Laterality Date     CHOLECYSTECTOMY, LAPOROSCOPIC  1997    Cholecystectomy, Laparoscopic     ENDARTERECTOMY CAROTID Right 8/31/2017    Procedure: ENDARTERECTOMY CAROTID;  RIGHT CAROTID ENDARTERECTOMY WITH EEG;  Surgeon: Hilario Parry MD;  Location:  OR     HYSTERECTOMY, RAYMOND  1982    oophorectomy,RAYMOND     SURGICAL HISTORY OF -       Laminectomy x 3     SURGICAL HISTORY OF -       MCA " Aneurysm repair     SURGICAL HISTORY OF -   1996    Bladder suspension (Bladder Repair x2)     SURGICAL HISTORY OF -       Bilateral Hand Surgeries for Arthritis x2     SURGICAL HISTORY OF -       Repair of a Cerebral Aneurysm     Medications:  Current Outpatient Medications   Medication Sig Dispense Refill     ACETAMINOPHEN EXTRA STRENGTH 500 MG tablet TAKE TWO TABLETS (1000MG) BY MOUTH THREE TIMES DAILY 120 tablet 98     ASPIRIN LOW DOSE 81 MG chewable tablet CHEW AND SWALLOW ONE TABLET BY MOUTH ONCE DAILY 30 tablet 11     atorvastatin (LIPITOR) 40 MG tablet TAKE 1 TABLET BY MOUTH ONCE DAILY 30 tablet 98     Blood Glucose Monitoring Suppl [facility] 2 times daily Call nurse for further directions if blood glucose is less than 80 or greater than 250       calcium carbonate (TUMS) 500 MG chewable tablet Take 1 chew tab by mouth every hour as needed for heartburn       DONEPEZIL HCL PO Take 5 mg by mouth daily       DULoxetine (CYMBALTA) 30 MG capsule Take 2 capsules (60 mg) by mouth daily AND 1 capsule (30 mg) At Bedtime. 90 capsule 11     fluticasone (FLONASE) 50 MCG/ACT nasal spray Spray 1 spray into both nostrils 2 times daily       furosemide (LASIX) 40 MG tablet Take 1 tablet (40 mg) by mouth 2 times daily 60 tablet 11     gabapentin (NEURONTIN) 100 MG capsule Take 2 capsules (200 mg) by mouth 2 times daily 120 capsule 11     isradipine (DYNACIRC) 5 MG capsule Take 5 mg by mouth 2 times daily       levalbuterol (XOPENEX) 1.25 MG/3ML neb solution Take 1 ampule by nebulization every 4 hours as needed for shortness of breath / dyspnea or wheezing And every 4 hours PRN       Lidocaine (LIDOCARE) 4 % Patch Place 1 patch onto the skin daily as needed To desired area (shoulder or hip). On for 12hrs, off for 12hrs       lisinopril (PRINIVIL/ZESTRIL) 20 MG tablet TAKE 1 TABLET BY MOUTH ONCE DAILY 30 tablet 98     MAGNESIUM OXIDE PO Take 250 mg by mouth daily       Menthol, Topical Analgesic, (BIOFREEZE) 4 % GEL Externally  apply topically 4 times daily as needed To left shoulder and right hip       nystatin (MYCOSTATIN) 005719 UNIT/GM external powder APPLY TOPICALLY TO ABDOMEN FOLDS, GROIN, AND BREASTS TWICE DAILY AS NEEDED 60 g 97     OMEPRAZOLE PO Take 40 mg by mouth every morning       senna-docusate (SENOKOT-S/PERICOLACE) 8.6-50 MG tablet Take 1 tablet by mouth 2 times daily as needed        tiZANidine (ZANAFLEX) 2 MG tablet TAKE 1 TABLET BY MOUTH THREE TIMES DAILY AS NEEDED 90 tablet 97     BREO ELLIPTA 100-25 MCG/INH inhaler INHALE 1 PUFF BY MOUTH ONCE DAILY 1 Inhaler 11     ferrous sulfate (FEROSUL) 325 (65 Fe) MG tablet Take 1 tablet (325 mg) by mouth daily (with breakfast) 60 tablet 98     furosemide (LASIX) 40 MG tablet Take 40 mg by mouth daily       hypromellose (ARTIFICIAL TEARS) 0.5 % SOLN ophthalmic solution Place 1 drop into both eyes every 6 hours as needed for dry eyes       oxyCODONE (OXYCONTIN) 10 MG 12 hr tablet Take 1 tablet (10 mg) by mouth every 12 hours maximum 2 tablet(s) per day 60 tablet 0     oxyCODONE (ROXICODONE) 5 MG tablet Take 1 tablet (5 mg) by mouth 3 times daily. May also take 1 tablet (5 mg) 2 times daily as needed for severe pain. 60 tablet 0     oxyCODONE (ROXICODONE) 5 MG tablet Take 1 tablet (5 mg) by mouth daily as needed for severe pain 30 tablet 0     vitamin D3 (CHOLECALCIFEROL) 1000 units (25 mcg) tablet Take 1 tablet (1,000 Units) by mouth daily 60 tablet 11     Allergies:     Allergies   Allergen Reactions     Accupril      Ace Inhibitors Unknown     Accupril     Augmentin Unknown     Blood-Group Specific Substance      Other reaction(s): *Unknown  Patient has a Non-specific antibody. Blood Product orders may be delayed.  Draw one red top and two purple top tubes for ALL Type and Screen/ Type and Crossmatch orders.      Levofloxacin Unknown, Muscle Pain (Myalgia) and Other (See Comments)     Comment: myalgias, Description:   Pt prescribed ciprofloxacin in Jan 2018 (no rxn  documented)  Myalgias       Morphine Other (See Comments)     hallucinations     Nitrofurantoin Nausea and Vomiting and Unknown     Norvasc [Amlodipine Besylate]      Leg swelling       Quinapril Other (See Comments) and Unknown     Hypertension. 3.29.18 - Takes and tolerates lisinopril  Patient reports HTN with as reaction to this medication       Social History:  Home situation: , has six adult children, and lives in residential facility   Occupation/Schooling: Homemaker   Tobacco use: Quit many years ago.  Alcohol use: None  Drug use: None  History of chemical dependency treatment: None    Family history:  Family History   Problem Relation Age of Onset     Cancer Mother         stomache     Cerebrovascular Disease Father      Heart Disease Father      Cancer Brother         lymphoma     Eye Disorder Son      Asthma Son      Diabetes Son      Gastrointestinal Disease Son         no control over bladder or bowels     C.A.D. No family hx of      Hypertension No family hx of      Breast Cancer No family hx of      Cancer - colorectal No family hx of      Prostate Cancer No family hx of          Review of Systems:  Skin: negative  Eyes: HA and dizziness   Ears/Nose/Throat: negative  Respiratory: No shortness of breath, dyspnea on exertion, cough, or hemoptysis  Cardiovascular: Edema and HTN  Gastrointestinal: negative  Genitourinary: negative  Musculoskeletal: back pain, neck pain, arthritis, joint pain, joint stiffness and arthritis  Neurologic: Weakness  Psychiatric: depression  Hematologic/Lymphatic/Immunologic: Easy bruising   Endocrine: negative    This document serves as a record of the services and decisions personally performed and made by Mirian HERNANDEZ. It was created on her behalf by Robert Mccabe, a trained medical scribe. The creation of this document is based on the provider's statements to the medical scribe.  Robert Mccabe 1:53 PM May 14, 2019      Physical Exam:  Vitals:    05/14/19 1342  05/14/19 1506   BP: 163/83 171/85   Pulse: 73 73     Exam:  Constitutional: healthy, alert and no distress  Head: normocephalic. Atraumatic.   Eyes: no redness or jaundice noted   ENT: oropharnx normal.  MMM.  Neck supple.    Cardiovascular: RRR no m/g/r   Respiratory: clear to auscultation A/P. Respirations easy and unlabored. Able to speak in full sentences without SOB or cough noted.    Gastrointestinal: soft, non-tender   : deferred  Skin: no suspicious lesions or rashes  Psychiatric: mentation appears normal and affect normal/bright    Musculoskeletal exam:  Gait/Station/Posture: Slow gait only able to take a couple steps, uses a wheelchair most of the time due to pain.    Cervical spine:    Flex:  20 degrees   Ext: 25 degrees   Rotation to right: 75 degrees   Rotation to left: 75 degrees   Ext/rotation to the right pain free    Ext/rotation to the left pain free    Thoracic spine:  Normal     Lumbar spine:    Flex:  25 degrees   Ext: 10 degrees painful    Rotation/ext to right: painful    Rotation/ext to left: painful    SI joints: Bilateral tenderness    Piriformis: Bilateral tenderness   Greater trochanters: Bilateral tenderness R>L    Myofascial tenderness:  Lumbar paraspinals bilaterally       Neurologic exam:  CN:  Cranial nerves 2-12 are  Grossly normal  Motor:  4/5 UE and LE strength  Reflexes:     Biceps:     R:  2/4 L: 2/4   Brachioradialis   R:  1/4 L: 1/4      Patella:  R:  1/4 L: 1/4    Other reflexes:  Toes downgoing   Baptiste's negative   Sensory:  (upper and lower extremities):   Light touch: normal    Vibration: normal    Pin prick: normal except for patchy reduction below the knee.   Allodynia: absent     Dysethesia: absent     Hyperalgesia: absent      Diagnostic tests:  CONCLUSION:  1.  Marked lumbar spondylosis. Moderate to marked canal and marked bilateral lateral recess narrowing at L1-L2. No other significant canal narrowing.    2.  Marked bilateral L1-L2 and moderate to marked left  T12-L1 foraminal narrowing.    3.  Posterior instrumented fusion L2-L5 and anterior interbody fusion at L4-L5. Laminectomies from L3 through L5. Scattered areas of mild to moderate residual foraminal narrowing at the operative levels are somewhat poorly evaluated.    4.  Left convexity lumbar curvature and multiple levels of mild degenerative anterolisthesis and retrolisthesis detailed above.    5.  Multilevel degenerative disc disease and facet arthropathy.   Result Narrative   [facility] RADIOLOGY    EXAM: MR LUMBAR SPINE WO CONTRAST  LOCATION: [facility]  DATE/TIME: 11/1/2018 5:34 PM    INDICATION: Low back pain, prior lumbar surgery low back pain with radicular symptoms, history back surgery.  COMPARISON: Plain film radiograph 10/29/2018 and CT abdomen pelvis of 03/29/2018.  TECHNIQUE: Without IV contrast.    FINDINGS:   Nomenclature is based on 5 lumbar type vertebral bodies with partially sacralized left L5. Mild left convexity lumbar curvature. Chronic anterolisthesis L4 on L5 with fusion across the interspace. 3 mm retrolisthesis each level from T12-L1 through L2-L3.   Mild chronic superior endplate compressions of T11 and T12. No associated marrow edema. Prior posterior instrumented fusion from L2-L5 pedicle screws at each level on the right and at L2, L3 and L5 on the left. Laminectomy changes are seen from L3-L5.   Small chronic postoperative collection in the L3-L4 laminectomy. Fluid signal within the L3-L4 and to a lesser extent L1-L2 and L2-L3 disc spaces favored to be degenerative. The conus tip is identified at L1-L2. Bilateral renal cysts. The dominant renal   cyst on the right has some T2 hypointensity layering dependently which may represent hemorrhage. Renal cortical volume loss. Fatty atrophy of the posterior paraspinous musculature. No definite additional visible paraspinous abnormality. Left total hip   arthroplasty. Mild degenerative change right hip.    T12-L1: Mild loss of disc  height. Disc bulging eccentric to the left with shallow left foraminal protrusion. Moderate facet arthropathy. Mild spinal canal stenosis. Mild right neural foraminal stenosis. Moderate to marked left neural foraminal stenosis.    L1-L2: Moderate to marked loss of disc height. Posterior osteophytic ridging. Mild disc bulging with shallow right paracentral and foraminal protrusion. Moderate to marked facet arthropathy. Ligamentum flavum thickening. Moderate to marked spinal canal   stenosis. Marked, right greater than left lateral recess narrowing. Marked right neural foraminal stenosis. Marked left neural foraminal stenosis.    L2-L3: Prior posterior instrumented fusion. Moderate loss of disc height. Previous laminectomy with decompression of the canal. Likely mild residual bilateral foraminal narrowing.     L3-L4: Prior posterior instrumented fusion and laminectomy. Decompression of the canal. Likely moderate bilateral residual foraminal narrowing though this is somewhat poorly evaluated.     L4-L5: Prior anterior and posterior instrumented fusion with chronic anterolisthesis. Prior laminectomy without significant canal narrowing. Likely mild right and moderate left foraminal narrowing.    L5-S1: Transitional level. Minimal loss of disc height. No canal or definite foraminal narrowing.         Other testing (labs, diagnostics):  5/11/2019  Cr. 1.18  Est GFR 42      Screening tools:     DIRE Score for ongoing opioid management is calculated as follows:    Diagnosis = 2    Intractability = 2    Risk: Psych = 2  Chem Hlth = 2  Reliability = 2  Social = 2    Efficacy = 2    Total DIRE Score = 14 (14 or higher predicts good candidate for ongoing opioid management; 13 or lower predicts poor candidate for opioid management)         Assessment:  1. Pain of right hip joint pain  2. Chrornic bilateral low back pain without sciatica   3. H/O lumbosacral spine surgery  4. Lumbar facet joint pain  5. Greater trochanteric  bursitis of both hips  6. Chronic pain syndrome  7. Chronic opiate use in the form of oxycodone 25mg/day which is equal to 37mg OME (oral morphine equivalent, also known as morphine milligram equivalent-MME)   8. PMHx includes: Benign HTN 5/1/2006 Abdominal pain, generalized 3/15/2006. Abdominal pain generalized 3/15/2006. Atherosclerosis of renal artery. 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 Cerebral aneurysm, nonruptured. Depressive disorder, not elsewhere classified. GERD. Female stress incontinence. Gastrointestinal malfunction arising from mental factors. Generalized osteoarthrosis, unspecified site. Herpes zoster dermatitis of eyelid. Insomnia, unspecified. Lumbago. Other chest pain. Other specified cardiac dysrhythmias. Rectocele. Unspecified disorder of kidney and ureter. Unspecified essential HTN.  9. PSHx includes: Endarterectomy carotid right 8/31/2017. Cholecystectomy, laparoscopic 1997. Surgical history of bladder suspension 1996. Hysterectomy 1982. Surgical history of laminectomy x3. MCA aneurysm repair. Bilateral hand surgeries for arthritis. Repair of a cerebral aneurysm.         Plan:  Diagnosis reviewed, treatment option addressed, and risk/benefits discussed.  Self-care instructions given.  I am recommending a multidisciplinary treatment plan to help this patient better manage her pain.      1. Physical Therapy: None at present, can schedule appointments with Tracy in the future.  2. Clinical Health Psychologist to address issues of relaxation, behavioral change, coping style, and other factors important to improvement: None at present   3. Diagnostic Studies: None at present   4. Medication Management:   1. Continue Tylenol Extra Strength 500-1000mg TID PRN  2. Continue Cymbalta 60mg in the AM and 30mg in the PM  3. Continue Gabapentin 200mg BID  4. Continue 4% Lidocaine patches re: bilateral hip pain  5. Continue Oxycodone as managed by PCP. I discussed other options for long acting pain  medication with the patient's PCP. Options might include OxyContin, Butrans patch, or Duragesic patch.  1. Ideally, since patient is known to tolerate oxycodone, I would consider working to keep her daily opiate dose the same, by using OxyContin 10mg every 12 hours and then allowing oxycodone 5mg tab once per day as needed for breakthrough pain. This would keep her daily oxycodone dose at 25mg/day but may offer the patient markedly improved pain control.   6. Continue tizanidine 2mg take TID PRN muscle spasms  5. Further procedures recommended: None at present   6. Jazmin to follow up with Primary Care provider regarding elevated blood pressure.  7. Schedule with [facility] in [place] with Dr. Ríos or Dr. Stuart re: right hip pain  8. Acupuncture: None at present   9. Urine toxicology screen today: None at present    10. Recommendations/follow-up for PCP:  See above  11. Release of information: None at present  12. Follow up: 4-6 weeks    Total time spent was 60 minutes, and more than 50% of face to face time was spent in counseling and/or coordination of care regarding principles of multidisciplinary care, medication management, and interventional options.    The information in this document, created by the medical scribe for me, accurately reflects the services I personally performed and the decisions made by me. I have reviewed and approved this document for accuracy prior to leaving the patient care area.  May 14, 2019         Mirian HERNANDEZ RN CNP, FNP  [facility]

## 2019-05-14 NOTE — PROGRESS NOTES
5-14-19   CC called and left VM for RN Staff at [facility] to get up date on member and CC will f/u as needed.   Mirian Weldon MA [facility] Coordinator   [phone]

## 2019-05-16 PROBLEM — M25.559 HIP PAIN: Status: ACTIVE | Noted: 2018-10-29

## 2019-05-16 PROBLEM — R33.9 RETENTION OF URINE: Status: ACTIVE | Noted: 2017-04-15

## 2019-05-16 PROBLEM — Z86.73 HISTORY OF STROKE: Status: ACTIVE | Noted: 2018-02-02

## 2019-05-16 PROBLEM — S92.309A MULTIPLE CLOSED FRACTURES OF METATARSAL BONE: Status: ACTIVE | Noted: 2018-05-28

## 2019-05-16 PROBLEM — G30.9 ALZHEIMER'S DEMENTIA WITHOUT BEHAVIORAL DISTURBANCE (H): Status: ACTIVE | Noted: 2017-05-12

## 2019-05-16 PROBLEM — R19.09 RIGHT GROIN MASS: Status: ACTIVE | Noted: 2018-05-03

## 2019-05-16 PROBLEM — N17.9 ACUTE KIDNEY INJURY (H): Status: ACTIVE | Noted: 2019-01-11

## 2019-05-16 PROBLEM — F02.80 ALZHEIMER'S DEMENTIA WITHOUT BEHAVIORAL DISTURBANCE (H): Status: ACTIVE | Noted: 2017-05-12

## 2019-05-16 PROBLEM — I50.32 CHRONIC DIASTOLIC CONGESTIVE HEART FAILURE (H): Status: ACTIVE | Noted: 2018-02-02

## 2019-05-16 PROBLEM — R26.2 IMPAIRED AMBULATION: Status: ACTIVE | Noted: 2018-10-29

## 2019-05-16 PROBLEM — Z86.79 HISTORY OF INTRACRANIAL ANEURYSM: Status: ACTIVE | Noted: 2017-04-14

## 2019-05-16 NOTE — PROGRESS NOTES
"  SUBJECTIVE:                                                    Jazmin Clifton is 86 year old female   Chief Complaint   Patient presents with     Hospital F/U     States that she is feeling better, but still feels weak. Pt is by herself today.         Hospital Follow-up Visit:    Hospital/Nursing Home/IP Rehab Facility: [facility]  Date of Admission: 5/10/19  Date of Discharge: 5/12/19  Reason(s) for Admission: COPD ans uterine prolapse            Problems taking medications regularly:  None       Medication changes since discharge: None       Problems adhering to non-medication therapy:  None  \"    Hospital Course:         Hypoxia - suspect mild COPD exacerbation   5/10/19 -- This is likely due to multiple meds/ COPD/ hypoventitaled lungs due to obesity and poor breathing effort.   5/11/2019 --  brief hypoxia yesterday after medications/narcs, improved today after nebs and IV steroids in ER yesterday. Now back on room air.  Will continue prednisone, transition to prednisone 20 mg, continue 4 times daily nebs for now.   Doubt pneumonia with no fever or WBC, but was questionable infiltrate on chest x-ray so was started on azithromycin on admission - will continue for now.     5/12/2019 -- resolved - ok to discharge to complete 3 more days of prednisone, otherwise on prior to admission medications.       Hypotensive episodes after blood pressure medications in patient with history of hypertension   5/10/19 -- One sporadic episode while working with OT and AFTER taking all her BP meds/ narcs and other meds. Very soon BP normalized with NO intervention except pursed lip breathing.  -will stop imdur, continue other medications and watch. May need to keep BP on higher end and taper down medications  5/12/2019 -- resolved after stopping imdur - I don't see a clear indication for this medication and she's doing well without it - will remain off imdur for now, reassess at follow-up with primary care provider. " "     ? UTI  Patient with history of recurrent UTIs, started on rocephin in ER for abnormal UA, although looks like more blood in urine from uterine prolapse as below.    Urine culture still pending on discharge, patient asymptomatic and will give 3rd day of rocephin today - 3 days of rocephin should be adequate even if UTI present.  Stopping antibiotics on discharge.      Uterine prolapse causing slight blood in pads/toilet   5/10/19 -- ED exam showed a prolapsed uterus in vagina with excorations. guaic negative. Hg stable.   5/11/2019 -- no bloody stools, just in pads - unclear if this could be contributing to other symptoms, but doesn't correlate with location of pain - discussed with OBGYN who recommended outpatient follow-up for likely pessary placement.  Recommend follow-up with them at first available appointment on discharge.       Recent visit with [facility] pharmacy   5/12/2019 -- had recommended stopping memantine and alendronate, which appears to have been planned but still listed for patient - stopped these 2 medications today- follow-up with primary care provider for further weaning of medications as able per [facility] recommendations. \"                    Summary of hospitalization:  [facility] discharge summary reviewed  Diagnostic Tests/Treatments reviewed.  Follow up needed: none  Other Healthcare Providers Involved in Patient s Care:         None  Update since discharge: improved.     Post Discharge Medication Reconciliation: discharge medications reconciled, continue medications without change.  Plan of care communicated with patient     Coding guidelines for this visit:  Type of Medical   Decision Making Face-to-Face Visit       within 7 Days of discharge Face-to-Face Visit        within 14 days of discharge   Moderate Complexity [id number]   High Complexity [id number]              Problem list and histories reviewed & adjusted, as indicated.  Additional history: urine culture negative.  Pain in " back and hip, taking narcotic but then sleepy.    Patient Active Problem List   Diagnosis     Atherosclerosis of renal artery (H)     Esophageal reflux     Cerebral aneurysm, nonruptured     Other specified cardiac dysrhythmias(427.89)     Essential hypertension, benign     Congenital cystic kidney disease     Benign neoplasm of colon     Renovascular hypertension     CHF (congestive heart failure) (H)     Restrictive lung disease     CARDIOVASCULAR SCREENING; LDL GOAL LESS THAN 100     Osteoporosis     S/P laminectomy     Hip joint replacement status     Osteoarthritis     DDD (degenerative disc disease), lumbar     Mild major depression (H)     Incontinence of urine     Chronic obstructive pulmonary disease, unspecified COPD type (H)     Generalized muscle weakness     Dizziness     Osteoarthritis of right hip, unspecified osteoarthritis type     CVA (cerebral vascular accident) (H)     Transient cerebral ischemia, unspecified type     CKD (chronic kidney disease) stage 3, GFR 30-59 ml/min (H)     Thyroid nodule     Trigger point of extremity     Trochanteric bursitis of right hip     Carotid stenosis, symptomatic w/o infarct, right     S/P carotid endarterectomy     Mild cognitive impairment     Morbid obesity (H)     Health Care Home     Shortness of breath     COPD exacerbation (H)     Weakness     Acute kidney injury (H)     Alzheimer's dementia without behavioral disturbance     Anaclitic depression     BPPV (benign paroxysmal positional vertigo)     Chronic diastolic congestive heart failure (H)     Constipation     Dyspepsia     Female stress incontinence     Hip pain     History of bradycardia     History of DVT (deep vein thrombosis)     History of herpes zoster     History of intracranial aneurysm     History of stroke     Hyperlipidemia     Impaired ambulation     Multiple closed fractures of metatarsal bone     Normocytic anemia     Postherpetic neuralgia     Rectocele     Retention of urine     Right  groin mass     Spinal stenosis of lumbar region     Spondylolisthesis, lumbar region     Umbilical hernia without obstruction and without gangrene     Past Surgical History:   Procedure Laterality Date     CHOLECYSTECTOMY, LAPOROSCOPIC      Cholecystectomy, Laparoscopic     ENDARTERECTOMY CAROTID Right 2017    Procedure: ENDARTERECTOMY CAROTID;  RIGHT CAROTID ENDARTERECTOMY WITH EEG;  Surgeon: Hilario Parry MD;  Location: [facility] OR     HYSTERECTOMY, RAYMOND  1982    oophorectomy,RAYMOND     SURGICAL HISTORY OF -       Laminectomy x 3     SURGICAL HISTORY OF -       MCA Aneurysm repair     SURGICAL HISTORY OF -       Bladder suspension (Bladder Repair x2)     SURGICAL HISTORY OF -       Bilateral Hand Surgeries for Arthritis x2     SURGICAL HISTORY OF -       Repair of a Cerebral Aneurysm       Social History     Tobacco Use     Smoking status: Former Smoker     Last attempt to quit: 1992     Years since quittin.3     Smokeless tobacco: Never Used   Substance Use Topics     Alcohol use: Yes     Comment: wine occas.     Family History   Problem Relation Age of Onset     Cancer Mother         stomache     Cerebrovascular Disease Father      Heart Disease Father      Cancer Brother         lymphoma     Eye Disorder Son      Asthma Son      Diabetes Son      Gastrointestinal Disease Son         no control over bladder or bowels     C.A.D. No family hx of      Hypertension No family hx of      Breast Cancer No family hx of      Cancer - colorectal No family hx of      Prostate Cancer No family hx of          Current Outpatient Medications   Medication Sig Dispense Refill     ACETAMINOPHEN EXTRA STRENGTH 500 MG tablet TAKE TWO TABLETS (1000MG) BY MOUTH THREE TIMES DAILY 120 tablet 98     ASPIRIN LOW DOSE 81 MG chewable tablet CHEW AND SWALLOW ONE TABLET BY MOUTH ONCE DAILY 30 tablet 11     atorvastatin (LIPITOR) 40 MG tablet TAKE 1 TABLET BY MOUTH ONCE DAILY 30 tablet 98     Blood Glucose Monitoring Suppl  CORY 2 times daily Call nurse for further directions if blood glucose is less than 80 or greater than 250       calcium carbonate (TUMS) 500 MG chewable tablet Take 1 chew tab by mouth every hour as needed for heartburn       calcium carbonate-vitamin D 600-200 MG-UNIT TABS Take 1 tablet by mouth daily       DONEPEZIL HCL PO Take 5 mg by mouth daily       DULoxetine (CYMBALTA) 30 MG capsule Take 2 capsules (60 mg) by mouth daily AND 1 capsule (30 mg) At Bedtime. 90 capsule 11     FEROSUL 325 (65 Fe) MG tablet TAKE 1 TABLET BY MOUTH TWICE DAILY 60 tablet 98     fluticasone (FLONASE) 50 MCG/ACT nasal spray Spray 1 spray into both nostrils 2 times daily       fluticasone-vilanterol (BREO ELLIPTA) 100-25 MCG/INH oral inhaler Inhale 1 puff into the lungs daily       furosemide (LASIX) 40 MG tablet Take 1 tablet (40 mg) by mouth 2 times daily 60 tablet 11     gabapentin (NEURONTIN) 100 MG capsule Take 2 capsules (200 mg) by mouth 2 times daily 120 capsule 11     guaiFENesin (MUCINEX) 600 MG 12 hr tablet Take 1 tablet (600 mg) by mouth 2 times daily for 10 days 20 tablet 0     isradipine (DYNACIRC) 5 MG capsule Take 5 mg by mouth 2 times daily       levalbuterol (XOPENEX) 1.25 MG/3ML neb solution Take 1 ampule by nebulization every 4 hours as needed for shortness of breath / dyspnea or wheezing And every 4 hours PRN       Lidocaine (LIDOCARE) 4 % Patch Place 1 patch onto the skin daily as needed To desired area (shoulder or hip). On for 12hrs, off for 12hrs       lisinopril (PRINIVIL/ZESTRIL) 20 MG tablet TAKE 1 TABLET BY MOUTH ONCE DAILY 30 tablet 98     MAGNESIUM OXIDE PO Take 250 mg by mouth daily       Menthol, Topical Analgesic, (BIOFREEZE) 4 % GEL Externally apply topically 4 times daily as needed To left shoulder and right hip       nystatin (MYCOSTATIN) 811547 UNIT/GM external powder APPLY TOPICALLY TO ABDOMEN FOLDS, GROIN, AND BREASTS TWICE DAILY AS NEEDED 60 g 97     OMEPRAZOLE PO Take 40 mg by mouth every  morning       oxyCODONE HCl (OXAYDO) 5 MG TABA Take 5 mg by mouth 3 times daily And 1 tablet 2 times a day as needed       senna-docusate (SENOKOT-S/PERICOLACE) 8.6-50 MG tablet Take 1 tablet by mouth 2 times daily as needed        tiZANidine (ZANAFLEX) 2 MG tablet TAKE 1 TABLET BY MOUTH THREE TIMES DAILY AS NEEDED 90 tablet 97     vitamin C (ASCORBIC ACID) 500 MG tablet TAKE 1 TABLET BY MOUTH ONCE DAILY 30 tablet 98     Allergies   Allergen Reactions     Accupril      Ace Inhibitors Unknown     Accupril     Augmentin Unknown     Blood-Group Specific Substance      Other reaction(s): *Unknown  Patient has a Non-specific antibody. Blood Product orders may be delayed.  Draw one red top and two purple top tubes for ALL Type and Screen/ Type and Crossmatch orders.      Levofloxacin Unknown, Muscle Pain (Myalgia) and Other (See Comments)     Comment: myalgias, Description:   Pt prescribed ciprofloxacin in Jan 2018 (no rxn documented)  Myalgias       Morphine Other (See Comments)     hallucinations     Nitrofurantoin Nausea and Vomiting and Unknown     Norvasc [Amlodipine Besylate]      Leg swelling       Quinapril Other (See Comments) and Unknown     Hypertension. 3.29.18 - Takes and tolerates lisinopril  Patient reports HTN with as reaction to this medication       Recent Labs   Lab Test 05/11/19  1509 05/10/19  1104  02/22/19  0622 02/21/19  0711  08/31/17  1113 08/14/17  0700   A1C  --   --   --   --   --   --  6.2*  --    LDL  --   --   --   --   --   --  28  --    HDL  --   --   --   --   --   --  61  --    TRIG  --   --   --   --   --   --  104  --    ALT  --  13  --  18 14   < >  --   --    CR 1.18* 1.43*   < > 1.09* 1.33*   < > 1.42*  --    GFRESTIMATED 42* 33*   < > 46* 36*   < > 35*  --    GFRESTBLACK 48* 38*   < > 53* 42*   < > 43*  --    POTASSIUM 4.2 3.8   < > 4.7 4.2   < > 5.3  --    TSH  --  0.51  --   --   --   --   --  0.62    < > = values in this interval not displayed.      BP Readings from Last 3  Encounters:   05/17/19 116/58   05/14/19 171/85   05/12/19 146/53    Wt Readings from Last 3 Encounters:   05/10/19 90.3 kg (199 lb)   05/07/19 88 kg (194 lb)   04/24/19 90.3 kg (199 lb)         ROS:  Constitutional, HEENT, cardiovascular, pulmonary, gi and gu systems are negative, except as otherwise noted.    OBJECTIVE:                                                    /58   Pulse 71   Temp 98  F (36.7  C) (Tympanic)   Resp 12   SpO2 94%   GENERAL APPEARANCE ADULT: alert, in distress, cooperative, tired appearing  RESP: lungs clear to auscultation   CV: normal rate, regular rhythm, no murmur or gallop  PSYCH: poor memory at times clear  Diagnostic Test Results:  none      ASSESSMENT/PLAN:                                                    1. Hospital discharge follow-up    1. CHF (congestive heart failure) (H)  Stable, no change in medication, finished prednisone, no complaint of dyspnea or chest pain  - HEART FAILURE ACTION PLAN    2. COPD exacerbation (H)  stable  - COPD ACTION PLAN    3. Prolapsed uterus  Appointment 5/30 cannot wait that long, spoke with Dr. Qian Gutierrez, on call for GYN and he can see her today, now.  After his exam noted hysterectomy and urethral mass, not prolapse, has referred to urology and canceled 5/30/19 appointment with GYN.  - OB/GYN REFERRAL    4. Arthralgia of hip, unspecified laterality  Narcotics for pain, then sleepy and not breathing, medication have been modified    5. Mild cognitive impairment  Here by herself and poor historian, most of history via chart    6. Retention of urine  resolved    Norah Foley MD  [facility]

## 2019-05-17 ENCOUNTER — OFFICE VISIT (OUTPATIENT)
Dept: FAMILY MEDICINE | Facility: CLINIC | Age: 84
End: 2019-05-17
Payer: COMMERCIAL

## 2019-05-17 ENCOUNTER — OFFICE VISIT (OUTPATIENT)
Dept: OBGYN | Facility: CLINIC | Age: 84
End: 2019-05-17
Payer: COMMERCIAL

## 2019-05-17 VITALS
TEMPERATURE: 98 F | OXYGEN SATURATION: 94 % | SYSTOLIC BLOOD PRESSURE: 116 MMHG | RESPIRATION RATE: 12 BRPM | HEART RATE: 71 BPM | DIASTOLIC BLOOD PRESSURE: 58 MMHG

## 2019-05-17 VITALS
BODY MASS INDEX: 35.25 KG/M2 | DIASTOLIC BLOOD PRESSURE: 71 MMHG | WEIGHT: 199 LBS | SYSTOLIC BLOOD PRESSURE: 132 MMHG | TEMPERATURE: 97.1 F | HEART RATE: 70 BPM

## 2019-05-17 DIAGNOSIS — N81.4 PROLAPSED UTERUS: ICD-10-CM

## 2019-05-17 DIAGNOSIS — R33.9 RETENTION OF URINE: ICD-10-CM

## 2019-05-17 DIAGNOSIS — M25.559 ARTHRALGIA OF HIP, UNSPECIFIED LATERALITY: ICD-10-CM

## 2019-05-17 DIAGNOSIS — N36.8 MASS OF URETHRA: Primary | ICD-10-CM

## 2019-05-17 DIAGNOSIS — Z09 HOSPITAL DISCHARGE FOLLOW-UP: Primary | ICD-10-CM

## 2019-05-17 DIAGNOSIS — G31.84 MILD COGNITIVE IMPAIRMENT: ICD-10-CM

## 2019-05-17 DIAGNOSIS — J44.1 COPD EXACERBATION (H): ICD-10-CM

## 2019-05-17 PROCEDURE — 99214 OFFICE O/P EST MOD 30 MIN: CPT | Performed by: FAMILY MEDICINE

## 2019-05-17 PROCEDURE — 99205 OFFICE O/P NEW HI 60 MIN: CPT | Performed by: OBSTETRICS & GYNECOLOGY

## 2019-05-17 NOTE — PROGRESS NOTES
"Consult    Ms. Jazmin Clifton 86 year old P6 (SVDx6) presents for referral from Family Medicine for suspected uterine prolapse.   She currently stays at [facility] [address] (an assisted living facility).   She was recently hospitalized for a COPD exacerbation was noted to have a mass protruding from her vagina. Upon discharge, she was referred to see Dr. Foley, a Family Medicine physician who then referred the patient to be seen by Gynecology.   Patient is also wheel chair bound.   Patient states that she has been feeling pain in her genital area for about 9 months. She also reports bleeding from her genital area after voiding that started a week ago. Other associated symptoms have been pain with urination.   Of note, she acknowledges having had a total hysterectomy in her 50s.       Past Medical History:   Diagnosis Date     ABDOMINAL PAIN GENERALIZED 3/15/2006    1 month of abdominal pain that llqwhich radiates into her back and chest.  Pt was hospitilzed 2x for the pain at [facility] --pt hopsitized for 3 days.  Rcords not ab=vailable.  She was told either \" twisted intestine or blockage of bowel.  Pt feels it is the hiatal hernia.  Pt hospitilized again a second time  A month ago.  Ct scans x 2 showed \" nothing.\"  Pt had a barium and xrays.  Pt sa     Abdominal pain, generalized 3/15/2006    1 month of abdominal pain that llqwhich radiates into her back and chest.  Pt was hospitilzed 2x for the pain at [facility] --pt hopsitized for 3 days.  Rcords not ab=vailable.  She was told either \" twisted intestine or blockage of bowel.  Pt feels it is the hiatal hernia.  Pt hospitilized again a second time  A month ago.  Ct scans x 2 showed \" nothing.\"  Pt had a barium and xrays.  Pt sa     Atherosclerosis of renal artery (H)     Left renal artery stenosis     BENIGN HYPERTENSION 5/1/2006 5/1/2006   Increase catapres to 0.3 mg and decrease clonidine to 0.1 mg daily.  Recheck bp in 1 month.      Benign " neoplasm of scalp and skin of neck     Seborrheic keratosis     Cerebral aneurysm, nonruptured     Cerebral Aneurysm     Depressive disorder, not elsewhere classified     Depression     Esophageal reflux     Gastroesophageal Reflux Disease     Female stress incontinence      Gastrointestinal malfunction arising from mental factors     Dyspepsia     Generalized osteoarthrosis, unspecified site     DJD-chronic back pain     Herpes zoster dermatitis of eyelid      Insomnia, unspecified      Lumbago     Chronic back pain     Other chest pain     Atypical Chest Pain     Other specified cardiac dysrhythmias(427.89)     Bradycardia, improved on lower dose beta blockers      Rectocele     Grade 3     Unspecified disorder of kidney and ureter     Renal insufficiency     Unspecified essential hypertension      Past Surgical History:   Procedure Laterality Date     CHOLECYSTECTOMY, LAPOROSCOPIC  1997    Cholecystectomy, Laparoscopic     ENDARTERECTOMY CAROTID Right 8/31/2017    Procedure: ENDARTERECTOMY CAROTID;  RIGHT CAROTID ENDARTERECTOMY WITH EEG;  Surgeon: Hilario Parry MD;  Location: [facility] OR     HYSTERECTOMY, RAYMOND  1982    oophorectomy,RAYMOND     SURGICAL HISTORY OF -       Laminectomy x 3     SURGICAL HISTORY OF -       MCA Aneurysm repair     SURGICAL HISTORY OF -   1996    Bladder suspension (Bladder Repair x2)     SURGICAL HISTORY OF -       Bilateral Hand Surgeries for Arthritis x2     SURGICAL HISTORY OF -       Repair of a Cerebral Aneurysm     Current Outpatient Medications   Medication     ACETAMINOPHEN EXTRA STRENGTH 500 MG tablet     ASPIRIN LOW DOSE 81 MG chewable tablet     atorvastatin (LIPITOR) 40 MG tablet     Blood Glucose Monitoring Suppl CORY     calcium carbonate (TUMS) 500 MG chewable tablet     calcium carbonate-vitamin D 600-200 MG-UNIT TABS     DONEPEZIL HCL PO     DULoxetine (CYMBALTA) 30 MG capsule     FEROSUL 325 (65 Fe) MG tablet     fluticasone (FLONASE) 50 MCG/ACT nasal spray      fluticasone-vilanterol (BREO ELLIPTA) 100-25 MCG/INH oral inhaler     furosemide (LASIX) 40 MG tablet     gabapentin (NEURONTIN) 100 MG capsule     guaiFENesin (MUCINEX) 600 MG 12 hr tablet     isradipine (DYNACIRC) 5 MG capsule     levalbuterol (XOPENEX) 1.25 MG/3ML neb solution     Lidocaine (LIDOCARE) 4 % Patch     lisinopril (PRINIVIL/ZESTRIL) 20 MG tablet     MAGNESIUM OXIDE PO     Menthol, Topical Analgesic, (BIOFREEZE) 4 % GEL     nystatin (MYCOSTATIN) 893833 UNIT/GM external powder     OMEPRAZOLE PO     oxyCODONE HCl (OXAYDO) 5 MG TABA     senna-docusate (SENOKOT-S/PERICOLACE) 8.6-50 MG tablet     tiZANidine (ZANAFLEX) 2 MG tablet     vitamin C (ASCORBIC ACID) 500 MG tablet     No current facility-administered medications for this visit.         Allergies   Allergen Reactions     Accupril      Ace Inhibitors Unknown     Accupril     Augmentin Unknown     Blood-Group Specific Substance      Other reaction(s): *Unknown  Patient has a Non-specific antibody. Blood Product orders may be delayed.  Draw one red top and two purple top tubes for ALL Type and Screen/ Type and Crossmatch orders.      Levofloxacin Unknown, Muscle Pain (Myalgia) and Other (See Comments)     Comment: myalgias, Description:   Pt prescribed ciprofloxacin in Jan 2018 (no rxn documented)  Myalgias       Morphine Other (See Comments)     hallucinations     Nitrofurantoin Nausea and Vomiting and Unknown     Norvasc [Amlodipine Besylate]      Leg swelling       Quinapril Other (See Comments) and Unknown     Hypertension. 3.29.18 - Takes and tolerates lisinopril  Patient reports HTN with as reaction to this medication       Family History   Problem Relation Age of Onset     Cancer Mother         stomache     Cerebrovascular Disease Father      Heart Disease Father      Cancer Brother         lymphoma     Eye Disorder Son      Asthma Son      Diabetes Son      Gastrointestinal Disease Son         no control over bladder or bowels     C.A.D. No  family hx of      Hypertension No family hx of      Breast Cancer No family hx of      Cancer - colorectal No family hx of      Prostate Cancer No family hx of      Social History     Tobacco Use     Smoking status: Former Smoker     Last attempt to quit: 1992     Years since quittin.3     Smokeless tobacco: Never Used   Substance Use Topics     Alcohol use: Yes     Comment: wine occas.     Drug use: No   C: NEGATIVE for fever, chills, change in weight  HEENT: NEGATIVE for visual changes, runny nose, epistaxis, ear pain, tinnitus, tooth ache, sore throat, difficulty with swallowing, sinus pain  R: NEGATIVE for significant cough or SOB  CV: NEGATIVE for chest pain, palpitations or peripheral edema  BREAST: NEGATIVE For soreness, pain, lumps or nipple discharge  GI: NEGATIVE for nausea, abdominal pain, heartburn, or change in bowel habits  : NEGATIVE for frequency, hematuria, vaginal discharge  Neuro: NEGATIVE for numbness, tingling, focal weakness, or headache  INT: NEGATIVE for rashes, lesions or pruritis   PSYCH: NEGATIVE for anxiety, depression, or halluciinations    Exam:   /71 (BP Location: Left arm, Patient Position: Chair, Cuff Size: Adult Regular)   Pulse 70   Temp 97.1  F (36.2  C) (Tympanic)   Wt 90.3 kg (199 lb)   BMI 35.25 kg/m    General Appearance: Well nourished, well developed female, in pain, AOx3  Neurological: Mental Status Normal   Skin: Normal skin turgor  HEENT: Atraumatic, normocephalic, EOMI  Lungs: Good respiratory effort  Abdomen: Soft, NT, ND, no masses  Pelvic: External female genitalia reveals a urethral firm mass about 3-4 cm in size just proximal to the urethral opening. Mass appears raw with areas of necrosis. Extremely tender to palpation.  No other external lesions, normal hair distribution, no adenopathy. Speculum exam reveals atrophic vaginal epithelium with no abnormal discharge. Vaginal cuff appears smooth, pink, with no visible lesions. Valsalva did not  demonstrate apical, anterior or posterior vaginal wall prolapse. Bimanual exam reveals surgically absent uterus, and no adnexal masses or tenderness. Extremities: No clubbing, no cyanosis and no edema      A/P: Urethral mass  -- informed patient of her diagnosis  -- referral to Urology recommended to have this condition addressed  -- concerns, questions addressed with patient re: likely treatment of urethral mass, as well as concern for the likelihood of malignancy      Total time spent was 60 minutes; greater than 50% of time was spent in counseling and/or coordination of care for the above listed diagnoses, not including time spent on the procedure.    Qian Gutierrez MD  [facility]

## 2019-05-17 NOTE — NURSING NOTE
"Initial /58   Pulse 71   Temp 98  F (36.7  C) (Tympanic)   Resp 12   SpO2 94%  Estimated body mass index is 35.25 kg/m  as calculated from the following:    Height as of 5/10/19: 1.6 m (5' 3\").    Weight as of 5/10/19: 90.3 kg (199 lb). .      "

## 2019-05-17 NOTE — NURSING NOTE
"Initial /71 (BP Location: Left arm, Patient Position: Chair, Cuff Size: Adult Regular)   Pulse 70   Temp 97.1  F (36.2  C) (Tympanic)   Wt 90.3 kg (199 lb)   BMI 35.25 kg/m   Estimated body mass index is 35.25 kg/m  as calculated from the following:    Height as of 5/10/19: 1.6 m (5' 3\").    Weight as of this encounter: 90.3 kg (199 lb). .    Bobbi Thompson    "

## 2019-05-20 ENCOUNTER — TELEPHONE (OUTPATIENT)
Dept: OBGYN | Facility: CLINIC | Age: 84
End: 2019-05-20

## 2019-05-20 ENCOUNTER — TELEPHONE (OUTPATIENT)
Dept: ORTHOPEDICS | Facility: CLINIC | Age: 84
End: 2019-05-20

## 2019-05-20 DIAGNOSIS — N36.8 MASS OF URETHRA: Primary | ICD-10-CM

## 2019-05-20 NOTE — TELEPHONE ENCOUNTER
Reviewed patient's chart prior to 5/21 appointment with Dr Ríos for her chronic right hip pain. Per orders from Pain Mgmt - Mirian Vazquez patient is being referred for possible intra-articular right hip injection.  Patient has recently completed oral prednisone burst on 5/16, would not recommend steroid injection until at least one week after completing prednisone (5/23).    [facility] for return call to discuss her appointment tomorrow.      Would recommend that patient reschedule for 5/23 or later.  Patient may still come for assessment/imaging on 5/21, if desired with understanding that steroid injection likely would be completed until later date.  Clinic staff please relay this info to patient when she returns phone call.    Kurt Graham ATC

## 2019-05-20 NOTE — TELEPHONE ENCOUNTER
Called the [facility] Urology office and was advised that pt will need to call the office to schedule an office visit.    Kaity Montejo  [facility] RN

## 2019-05-20 NOTE — TELEPHONE ENCOUNTER
Reason for Call:  Other call back    Detailed comments: Pt's homecare therapist calling.  States pt states she was supposed to get a call from [facility] to schedule an appt but has not heard from them.    Phone Number Patient can be reached at: Home number on file [phone] (home)    Best Time: any    Can we leave a detailed message on this number? Not Applicable    Call taken on 5/20/2019 at 10:28 AM by Adelita Dee       3

## 2019-05-20 NOTE — TELEPHONE ENCOUNTER
Called pt left message to return call.  Order placed for referral to the [facility] Urology clinic.  Pt to call [phone] and this office is open until 7pm.    Kaity Montejo  [facility] RN

## 2019-05-20 NOTE — TELEPHONE ENCOUNTER
Patient returned phone call and discussed instructions from below.  She was in agreement to reschedule appointment to 5/23 @ [facility], so that injection may be completed on same day.    Kurt Graham ATC

## 2019-05-20 NOTE — TELEPHONE ENCOUNTER
Patient has an appointment scheduled in Urology for 5/30/19. Patient aware of appointment and reports understanding.    Rhona Pedraza   [facility]  RN

## 2019-05-21 ENCOUNTER — ASSISTED LIVING VISIT (OUTPATIENT)
Dept: GERIATRICS | Facility: CLINIC | Age: 84
End: 2019-05-21
Payer: COMMERCIAL

## 2019-05-21 ENCOUNTER — TELEPHONE (OUTPATIENT)
Dept: UROLOGY | Facility: CLINIC | Age: 84
End: 2019-05-21

## 2019-05-21 ENCOUNTER — PRE VISIT (OUTPATIENT)
Dept: UROLOGY | Facility: CLINIC | Age: 84
End: 2019-05-21

## 2019-05-21 VITALS
SYSTOLIC BLOOD PRESSURE: 164 MMHG | BODY MASS INDEX: 34.9 KG/M2 | TEMPERATURE: 98.5 F | OXYGEN SATURATION: 94 % | DIASTOLIC BLOOD PRESSURE: 70 MMHG | RESPIRATION RATE: 18 BRPM | HEART RATE: 73 BPM | WEIGHT: 197 LBS

## 2019-05-21 DIAGNOSIS — M81.0 AGE-RELATED OSTEOPOROSIS WITHOUT CURRENT PATHOLOGICAL FRACTURE: ICD-10-CM

## 2019-05-21 DIAGNOSIS — I10 ESSENTIAL HYPERTENSION: ICD-10-CM

## 2019-05-21 DIAGNOSIS — M51.369 DDD (DEGENERATIVE DISC DISEASE), LUMBAR: ICD-10-CM

## 2019-05-21 DIAGNOSIS — E66.01 MORBID OBESITY (H): ICD-10-CM

## 2019-05-21 DIAGNOSIS — G89.4 CHRONIC PAIN SYNDROME: ICD-10-CM

## 2019-05-21 DIAGNOSIS — J44.1 COPD EXACERBATION (H): ICD-10-CM

## 2019-05-21 DIAGNOSIS — N18.4 ANEMIA OF CHRONIC RENAL FAILURE, STAGE 4 (SEVERE) (H): ICD-10-CM

## 2019-05-21 DIAGNOSIS — N39.0 RECURRENT UTI: ICD-10-CM

## 2019-05-21 DIAGNOSIS — N36.8 MASS OF URETHRA: Primary | ICD-10-CM

## 2019-05-21 DIAGNOSIS — D63.1 ANEMIA OF CHRONIC RENAL FAILURE, STAGE 4 (SEVERE) (H): ICD-10-CM

## 2019-05-21 RX ORDER — OXYCODONE HYDROCHLORIDE 5 MG/1
5 TABLET ORAL DAILY PRN
Qty: 30 TABLET | Refills: 0 | Status: SHIPPED | OUTPATIENT
Start: 2019-05-21 | End: 2019-06-05

## 2019-05-21 RX ORDER — FUROSEMIDE 40 MG
40 TABLET ORAL DAILY
COMMUNITY
End: 2019-06-07

## 2019-05-21 RX ORDER — OXYCODONE HCL 10 MG/1
10 TABLET, FILM COATED, EXTENDED RELEASE ORAL EVERY 12 HOURS
Qty: 60 TABLET | Refills: 0 | Status: SHIPPED | OUTPATIENT
Start: 2019-05-21 | End: 2019-07-30

## 2019-05-21 RX ORDER — FERROUS SULFATE 325(65) MG
325 TABLET ORAL
Qty: 60 TABLET | Refills: 98 | Status: SHIPPED | OUTPATIENT
Start: 2019-05-21 | End: 2019-07-30

## 2019-05-21 NOTE — TELEPHONE ENCOUNTER
Reason for visit: urethral mass      Relevant information: Pt referred by Dr. Laughlin    Records/imaging/labs/orders: all records available    Pt called: no need for a call    At Rooming: regular

## 2019-05-21 NOTE — TELEPHONE ENCOUNTER
MEDICAL RECORDS REQUEST   [facility]  Urology Clinic  [address] [place] [address]  PHONE: [phone]  Fax: [phone]        FUTURE VISIT INFORMATION                                                   Jazmin Clifton, : 1932 scheduled for future visit at [facility]    APPOINTMENT INFORMATION:    Date: 19 9:15AM      Provider:  Yesy Fong MD     Reason for Visit/Diagnosis: Urethral mass    REFERRAL INFORMATION:    Referring provider:  Qian Gutierrez     Specialty: MD    Referring providers clinic:  [facility]     Clinic contact number: [phone]    RECORDS REQUESTED FOR VISIT                                                     NOTES  STATUS/DETAILS   OFFICE NOTE from referring provider  yes   OFFICE NOTE from other specialist  no   DISCHARGE SUMMARY from hospital  no   DISCHARGE REPORT from the ER  no   OPERATIVE REPORT  no   MEDICATION LIST  no     PRE-VISIT CHECKLIST      Record collection complete Yes - All recs in Epic    Appointment appropriately scheduled           (right time/right provider) Yes   MyChart activation No   Questionnaire complete If no, please explain: In process     Completed by: Carmen Patel

## 2019-05-21 NOTE — PROGRESS NOTES
[facility]  [facility] Record Number:  [id number]  Place of Service where encounter took place:  [facility] [[id number]]  Chief Complaint   Patient presents with     RECHECK       HPI:    Tamra Clifton  is a 86 year old (12/30/1932), who is being seen today for an episodic care visit.  HPI information obtained from: facility chart records, facility staff, patient report and [facility] chart review. Today's concern is:     Mass of urethra  Recurrent UTI  Anemia of chronic renal failure, stage 4 (severe) (H)  COPD exacerbation (H)  Morbid obesity (H)  Essential hypertension  Chronic pain syndrome  DDD (degenerative disc disease), lumbar  Age-related osteoporosis without current pathological fracture   Reports she started noticing blood in her pad about 2 weeks ago. Concerns for prolapsed uterus while IP so she was referred to gynecology. She was seen on 5/17, and they found a urethral mass,concerning for malignancy. She has been referred to urology at the [facility]. She denies any abdominal discomfort today, but does continued to have some bleeding - she noted blood in the toilet and frequently in her incontinence pad. No clots noted. Does have a history of a hysterectomy.     Hospitalized 5/10-5/12 for likely mild COPD exacerbation. She was treated with steroids x 3 days and rocephin x3 days. She states her breathing is fine today, has not been coughing, SOB with activity but that is her baseline. She is primarily WC bound.     She continue to have right hip and low back pain. She was seen by pain clinic last week to establish care.  They spoke directly with provider to discuss medication management. Discussed with Tamra today changing oxycodone 5mg TID and BID PRN to oxycontin ER 1mg BID and oxycodone 5mg daily PRN for severe breakthrough pain as this was the recommendation of the pain clinic. She was agreeable to this plan. She continues to have some pain in  her right groin seroma, feels like it is getting bigger. She does have new compression shorts ordered by lymph edema therapy, but has not been wearing them d/t her vaginal bleeding. She was encouraged to wear them and continue to use an incontinence pad for protection as compression likely to help control seroma.     Past Medical and Surgical History reviewed in Epic today.    MEDICATIONS:  Current Outpatient Medications   Medication Sig Dispense Refill     ACETAMINOPHEN EXTRA STRENGTH 500 MG tablet TAKE TWO TABLETS (1000MG) BY MOUTH THREE TIMES DAILY 120 tablet 98     ASPIRIN LOW DOSE 81 MG chewable tablet CHEW AND SWALLOW ONE TABLET BY MOUTH ONCE DAILY 30 tablet 11     atorvastatin (LIPITOR) 40 MG tablet TAKE 1 TABLET BY MOUTH ONCE DAILY 30 tablet 98     Blood Glucose Monitoring Suppl CORY 2 times daily Call nurse for further directions if blood glucose is less than 80 or greater than 250       calcium carbonate (TUMS) 500 MG chewable tablet Take 1 chew tab by mouth every hour as needed for heartburn       DONEPEZIL HCL PO Take 5 mg by mouth daily       DULoxetine (CYMBALTA) 30 MG capsule Take 2 capsules (60 mg) by mouth daily AND 1 capsule (30 mg) At Bedtime. 90 capsule 11     ferrous sulfate (FEROSUL) 325 (65 Fe) MG tablet Take 1 tablet (325 mg) by mouth daily (with breakfast) 60 tablet 98     fluticasone (FLONASE) 50 MCG/ACT nasal spray Spray 1 spray into both nostrils 2 times daily       fluticasone-vilanterol (BREO ELLIPTA) 100-25 MCG/INH oral inhaler Inhale 1 puff into the lungs daily       furosemide (LASIX) 40 MG tablet Take 40 mg by mouth daily       furosemide (LASIX) 40 MG tablet Take 1 tablet (40 mg) by mouth 2 times daily 60 tablet 11     gabapentin (NEURONTIN) 100 MG capsule Take 2 capsules (200 mg) by mouth 2 times daily 120 capsule 11     hypromellose (ARTIFICIAL TEARS) 0.5 % SOLN ophthalmic solution Place 1 drop into both eyes every 6 hours as needed for dry eyes       isradipine (DYNACIRC) 5 MG  capsule Take 5 mg by mouth 2 times daily       levalbuterol (XOPENEX) 1.25 MG/3ML neb solution Take 1 ampule by nebulization every 4 hours as needed for shortness of breath / dyspnea or wheezing And every 4 hours PRN       Lidocaine (LIDOCARE) 4 % Patch Place 1 patch onto the skin daily as needed To desired area (shoulder or hip). On for 12hrs, off for 12hrs       lisinopril (PRINIVIL/ZESTRIL) 20 MG tablet TAKE 1 TABLET BY MOUTH ONCE DAILY 30 tablet 98     MAGNESIUM OXIDE PO Take 250 mg by mouth daily       Menthol, Topical Analgesic, (BIOFREEZE) 4 % GEL Externally apply topically 4 times daily as needed To left shoulder and right hip       nystatin (MYCOSTATIN) 945503 UNIT/GM external powder APPLY TOPICALLY TO ABDOMEN FOLDS, GROIN, AND BREASTS TWICE DAILY AS NEEDED 60 g 97     OMEPRAZOLE PO Take 40 mg by mouth every morning       oxyCODONE (OXYCONTIN) 10 MG 12 hr tablet Take 1 tablet (10 mg) by mouth every 12 hours maximum 2 tablet(s) per day 60 tablet 0     oxyCODONE (ROXICODONE) 5 MG tablet Take 1 tablet (5 mg) by mouth daily as needed for severe pain 30 tablet 0     senna-docusate (SENOKOT-S/PERICOLACE) 8.6-50 MG tablet Take 1 tablet by mouth 2 times daily as needed        tiZANidine (ZANAFLEX) 2 MG tablet TAKE 1 TABLET BY MOUTH THREE TIMES DAILY AS NEEDED 90 tablet 97     vitamin D3 (CHOLECALCIFEROL) 1000 units (25 mcg) tablet Take 1 tablet (1,000 Units) by mouth daily 60 tablet 11         REVIEW OF SYSTEMS:  4 point ROS including Respiratory, CV, GI and , other than that noted in the HPI,  is negative    Objective:  /70   Pulse 73   Temp 98.5  F (36.9  C)   Resp 18   Wt 89.4 kg (197 lb)   SpO2 94%   BMI 34.90 kg/m    Exam:  GENERAL APPEARANCE:  Alert, in no distress, oriented, cooperative  RESP:  respiratory effort and palpation of chest normal, lungs clear to auscultation , no respiratory distress  CV:  Palpation and auscultation of heart done , regular rate and rhythm, no murmur, rub, or  gallop, +2 pedal pulses, peripheral edema 2+ in BLE  ABDOMEN:  no guarding or rebound, bowel sounds normal, soft, non-tender  M/S:   primarily non-ambulatory, no gross joint deformities, pain in right hip with movemetn  SKIN:  Inspection of skin and subcutaneous tissue baseline, right groin seroma, stable in size, present   NEURO:   Cranial nerves 2-12 are normal tested and grossly at patient's baseline  PSYCH:  oriented X 3, normal insight, judgement and memory, affect and mood normal    Labs:   Recent labs in [facility] reviewed by me today.  and   Most Recent 3 CBC's:  Recent Labs   Lab Test 05/11/19  1509 05/10/19  1104 05/02/19  0946  02/27/19  0753   WBC 7.4 7.8  --   --  9.2   HGB 12.2 11.9 11.9   < > 11.5*   MCV 91 93  --   --  91    167  --   --  249    < > = values in this interval not displayed.     Most Recent 3 BMP's:  Recent Labs   Lab Test 05/11/19  1509 05/10/19  1104 05/09/19  0822    143 143   POTASSIUM 4.2 3.8 3.7   CHLORIDE 104 105 106   CO2 30 33* 30   BUN 36* 36* 34*   CR 1.18* 1.43* 1.15*   ANIONGAP 8 5 7   BLAIR 9.7 9.6 9.9   * 112* 146*       ASSESSMENT/PLAN:  (N36.8) Mass of urethra  (primary encounter diagnosis)  Comment: Possible malignant, continues to have some bleeding  Plan: F/u with urology as scheduled on 5/23, monitor bleeding    (N39.0) Recurrent UTI  Comment: likely d/t the above, completed 3 days of rocephin while IP, but UC returned negative.   Plan: F/u with urology as scheduled on 5/23    (N18.4,  D63.1) Anemia of chronic renal failure, stage 4 (severe) (H)  Comment: hgb has been stable   Plan: ferrous sulfate (FEROSUL) 325 (65 Fe) MG tablet        Will decrease to once daily, check hgb in 2 weeks.    (J44.1) COPD exacerbation (H)  Comment: appears resolved, completed 3 days of steroids and abx  Plan: continue Breo Ellipta 1 puff daily, continue to monitor.     (E66.01) Morbid obesity (H)  Comment: BMI 34.9, likely d/t limited activity, caloric intake Goal in this  age group is 23-30  Plan: Continue to monitor, given age and co-morbidities no further interventions.     (I10) Essential hypertension  Comment: noted hypotension noted prior to recent hospitalization, improved rapidly. Isosorbide was discontinued   Plan: Will continue lasix 40mg BID, lisinopril 20mg daily, and  - per [facility] pharm consider increasing lisinopril to 40mg daily and decreasing isradipine to 2.5mg daily, but given recent renal function and hypotension will hold off on this for now.     (G89.4) Chronic pain syndrome  (M51.36) DDD (degenerative disc disease), lumbar  Comment: Seen by pain clinic on 5/14, see note for full recommendations  Plan: Will discontinue current oxycodone orders and started oxycontin ER 10mg BID and oxycodone 5mg daily PRN, continue duloxetine 60mg q AM and 30mg at bedtime, tizanidine 2mg TID PRN, gabapentin 200mg BID, lidocaine patch daily PRN, and tylenol 1000mg TID. Continue to monitor and adjust     (M81.0) Age-related osteoporosis without current pathological fracture  Comment: Per [facility] pharmacy given limit mobility and low falls risk likely no continued benefit of alendronate so this was discontinued while IP. Also recommended stopping calcium given mild intake and low falls risk.   Plan: Will discontinue calcium/vitamin D - start vitamin d3 1000unit daily. Monitor     transcribed by : Aliyah Milan  Orders:  1. Discontinue current oxycodone orders.  2. Oxycontin ER  10 mg PO every 12 hours - Dx:Pain  3. Oxycodone 5 mg PO every day PRN - Dx: pain (electronic Rx sent)  4. Decrease Ferrous Sulfate to 325 mg PO every day - Dx: anemia  5. Discontinue Vitamin c, calcium vitamin D  6. Start vitamin D3 1000 units daily - Dx: Osteoporosis  7. Hgb, magnesium, in 1 week - Dx: anemia, drug monitoring    Electronically signed by:  MARY Gómez CNP

## 2019-05-21 NOTE — LETTER
5/21/2019        RE: [facility] [place]  [address]  [facility] [address]        [facility]  [place] [facility] Record Number:  [id number]  Place of Service where encounter took place:  [facility] [place] [facility] [[id number]]  Chief Complaint   Patient presents with     RECHECK       HPI:    Jazmin Clifton  is a 86 year old (12/30/1932), who is being seen today for an episodic care visit.  HPI information obtained from: facility chart records, facility staff, patient report and [facility] Epic chart review. Today's concern is:     Mass of urethra  Recurrent UTI  Anemia of chronic renal failure, stage 4 (severe) (H)  COPD exacerbation (H)  Morbid obesity (H)  Essential hypertension  Chronic pain syndrome  DDD (degenerative disc disease), lumbar  Age-related osteoporosis without current pathological fracture   Reports she started noticing blood in her pad about 2 weeks ago. Concerns for prolapsed uterus while IP so she was referred to gynecology. She was seen on 5/17, and they found a urethral mass,concerning for malignancy. She has been referred to urology at the [facility]. She denies any abdominal discomfort today, but does continued to have some bleeding - she noted blood in the toilet and frequently in her incontinence pad. No clots noted. Does have a history of a hysterectomy.     Hospitalized 5/10-5/12 for likely mild COPD exacerbation. She was treated with steroids x 3 days and rocephin x3 days. She states her breathing is fine today, has not been coughing, SOB with activity but that is her baseline. She is primarily WC bound.     She continue to have right hip and low back pain. She was seen by pain clinic last week to establish care.  They spoke directly with provider to discuss medication management. Discussed with Jazmin today changing oxycodone 5mg TID and BID PRN to oxycontin ER 1mg BID and oxycodone 5mg daily PRN for severe breakthrough pain as this  was the recommendation of the pain clinic. She was agreeable to this plan. She continues to have some pain in her right groin seroma, feels like it is getting bigger. She does have new compression shorts ordered by lymph edema therapy, but has not been wearing them d/t her vaginal bleeding. She was encouraged to wear them and continue to use an incontinence pad for protection as compression likely to help control seroma.     Past Medical and Surgical History reviewed in Epic today.    MEDICATIONS:  Current Outpatient Medications   Medication Sig Dispense Refill     ACETAMINOPHEN EXTRA STRENGTH 500 MG tablet TAKE TWO TABLETS (1000MG) BY MOUTH THREE TIMES DAILY 120 tablet 98     ASPIRIN LOW DOSE 81 MG chewable tablet CHEW AND SWALLOW ONE TABLET BY MOUTH ONCE DAILY 30 tablet 11     atorvastatin (LIPITOR) 40 MG tablet TAKE 1 TABLET BY MOUTH ONCE DAILY 30 tablet 98     Blood Glucose Monitoring Suppl CORY 2 times daily Call nurse for further directions if blood glucose is less than 80 or greater than 250       calcium carbonate (TUMS) 500 MG chewable tablet Take 1 chew tab by mouth every hour as needed for heartburn       DONEPEZIL HCL PO Take 5 mg by mouth daily       DULoxetine (CYMBALTA) 30 MG capsule Take 2 capsules (60 mg) by mouth daily AND 1 capsule (30 mg) At Bedtime. 90 capsule 11     ferrous sulfate (FEROSUL) 325 (65 Fe) MG tablet Take 1 tablet (325 mg) by mouth daily (with breakfast) 60 tablet 98     fluticasone (FLONASE) 50 MCG/ACT nasal spray Spray 1 spray into both nostrils 2 times daily       fluticasone-vilanterol (BREO ELLIPTA) 100-25 MCG/INH oral inhaler Inhale 1 puff into the lungs daily       furosemide (LASIX) 40 MG tablet Take 40 mg by mouth daily       furosemide (LASIX) 40 MG tablet Take 1 tablet (40 mg) by mouth 2 times daily 60 tablet 11     gabapentin (NEURONTIN) 100 MG capsule Take 2 capsules (200 mg) by mouth 2 times daily 120 capsule 11     hypromellose (ARTIFICIAL TEARS) 0.5 % SOLN  ophthalmic solution Place 1 drop into both eyes every 6 hours as needed for dry eyes       isradipine (DYNACIRC) 5 MG capsule Take 5 mg by mouth 2 times daily       levalbuterol (XOPENEX) 1.25 MG/3ML neb solution Take 1 ampule by nebulization every 4 hours as needed for shortness of breath / dyspnea or wheezing And every 4 hours PRN       Lidocaine (LIDOCARE) 4 % Patch Place 1 patch onto the skin daily as needed To desired area (shoulder or hip). On for 12hrs, off for 12hrs       lisinopril (PRINIVIL/ZESTRIL) 20 MG tablet TAKE 1 TABLET BY MOUTH ONCE DAILY 30 tablet 98     MAGNESIUM OXIDE PO Take 250 mg by mouth daily       Menthol, Topical Analgesic, (BIOFREEZE) 4 % GEL Externally apply topically 4 times daily as needed To left shoulder and right hip       nystatin (MYCOSTATIN) 688783 UNIT/GM external powder APPLY TOPICALLY TO ABDOMEN FOLDS, GROIN, AND BREASTS TWICE DAILY AS NEEDED 60 g 97     OMEPRAZOLE PO Take 40 mg by mouth every morning       oxyCODONE (OXYCONTIN) 10 MG 12 hr tablet Take 1 tablet (10 mg) by mouth every 12 hours maximum 2 tablet(s) per day 60 tablet 0     oxyCODONE (ROXICODONE) 5 MG tablet Take 1 tablet (5 mg) by mouth daily as needed for severe pain 30 tablet 0     senna-docusate (SENOKOT-S/PERICOLACE) 8.6-50 MG tablet Take 1 tablet by mouth 2 times daily as needed        tiZANidine (ZANAFLEX) 2 MG tablet TAKE 1 TABLET BY MOUTH THREE TIMES DAILY AS NEEDED 90 tablet 97     vitamin D3 (CHOLECALCIFEROL) 1000 units (25 mcg) tablet Take 1 tablet (1,000 Units) by mouth daily 60 tablet 11         REVIEW OF SYSTEMS:  4 point ROS including Respiratory, CV, GI and , other than that noted in the HPI,  is negative    Objective:  /70   Pulse 73   Temp 98.5  F (36.9  C)   Resp 18   Wt 89.4 kg (197 lb)   SpO2 94%   BMI 34.90 kg/m     Exam:  GENERAL APPEARANCE:  Alert, in no distress, oriented, cooperative  RESP:  respiratory effort and palpation of chest normal, lungs clear to auscultation , no  respiratory distress  CV:  Palpation and auscultation of heart done , regular rate and rhythm, no murmur, rub, or gallop, +2 pedal pulses, peripheral edema 2+ in BLE  ABDOMEN:  no guarding or rebound, bowel sounds normal, soft, non-tender  M/S:   primarily non-ambulatory, no gross joint deformities, pain in right hip with movemetn  SKIN:  Inspection of skin and subcutaneous tissue baseline, right groin seroma, stable in size, present   NEURO:   Cranial nerves 2-12 are normal tested and grossly at patient's baseline  PSYCH:  oriented X 3, normal insight, judgement and memory, affect and mood normal    Labs:   Recent labs in [facility] reviewed by me today.  and   Most Recent 3 CBC's:  Recent Labs   Lab Test 05/11/19  1509 05/10/19  1104 05/02/19  0946  02/27/19  0753   WBC 7.4 7.8  --   --  9.2   HGB 12.2 11.9 11.9   < > 11.5*   MCV 91 93  --   --  91    167  --   --  249    < > = values in this interval not displayed.     Most Recent 3 BMP's:  Recent Labs   Lab Test 05/11/19  1509 05/10/19  1104 05/09/19  0822    143 143   POTASSIUM 4.2 3.8 3.7   CHLORIDE 104 105 106   CO2 30 33* 30   BUN 36* 36* 34*   CR 1.18* 1.43* 1.15*   ANIONGAP 8 5 7   BLAIR 9.7 9.6 9.9   * 112* 146*       ASSESSMENT/PLAN:  (N36.8) Mass of urethra  (primary encounter diagnosis)  Comment: Possible malignant, continues to have some bleeding  Plan: F/u with urology as scheduled on 5/23, monitor bleeding    (N39.0) Recurrent UTI  Comment: likely d/t the above, completed 3 days of rocephin while IP, but UC returned negative.   Plan: F/u with urology as scheduled on 5/23    (N18.4,  D63.1) Anemia of chronic renal failure, stage 4 (severe) (H)  Comment: hgb has been stable   Plan: ferrous sulfate (FEROSUL) 325 (65 Fe) MG tablet        Will decrease to once daily, check hgb in 2 weeks.    (J44.1) COPD exacerbation (H)  Comment: appears resolved, completed 3 days of steroids and abx  Plan: continue Breo Ellipta 1 puff daily, continue to  monitor.     (E66.01) Morbid obesity (H)  Comment: BMI 34.9, likely d/t limited activity, caloric intake Goal in this age group is 23-30  Plan: Continue to monitor, given age and co-morbidities no further interventions.     (I10) Essential hypertension  Comment: noted hypotension noted prior to recent hospitalization, improved rapidly. Isosorbide was discontinued   Plan: Will continue lasix 40mg BID, lisinopril 20mg daily, and  - per [facility] pharm consider increasing lisinopril to 40mg daily and decreasing isradipine to 2.5mg daily, but given recent renal function and hypotension will hold off on this for now.     (G89.4) Chronic pain syndrome  (M51.36) DDD (degenerative disc disease), lumbar  Comment: Seen by pain clinic on 5/14, see note for full recommendations  Plan: Will discontinue current oxycodone orders and started oxycontin ER 10mg BID and oxycodone 5mg daily PRN, continue duloxetine 60mg q AM and 30mg at bedtime, tizanidine 2mg TID PRN, gabapentin 200mg BID, lidocaine patch daily PRN, and tylenol 1000mg TID. Continue to monitor and adjust     (M81.0) Age-related osteoporosis without current pathological fracture  Comment: Per [facility] pharmacy given limit mobility and low falls risk likely no continued benefit of alendronate so this was discontinued while IP. Also recommended stopping calcium given mild intake and low falls risk.   Plan: Will discontinue calcium/vitamin D - start vitamin d3 1000unit daily. Monitor     transcribed by : Aliyah Milan  Orders:  1. Discontinue current oxycodone orders.  2. Oxycontin ER  10 mg PO every 12 hours - Dx:Pain  3. Oxycodone 5 mg PO every day PRN - Dx: pain (electronic Rx sent)  4. Decrease Ferrous Sulfate to 325 mg PO every day - Dx: anemia  5. Discontinue Vitamin c, calcium vitamin D  6. Start vitamin D3 1000 units daily - Dx: Osteoporosis  7. Hgb, magnesium, in 1 week - Dx: anemia, drug monitoring    Electronically signed by:  MARY Gómez CNP              Sincerely,        MARY Gómez CNP

## 2019-05-22 ENCOUNTER — TELEPHONE (OUTPATIENT)
Dept: FAMILY MEDICINE | Facility: CLINIC | Age: 84
End: 2019-05-22

## 2019-05-22 NOTE — TELEPHONE ENCOUNTER
[facility] now requests orders and shares plan of care/discharge summaries for some patients through [employer].  Please REPLY TO THIS MESSAGE OR ROUTE BACK TO THE AUTHOR in order to give authorization for orders when needed.  This is considered a verbal order, you will still receive a faxed copy of orders for signature.  Thank you for your assistance in improving collaboration for our patients.    Can we have a care coordinator with the clinic be associated with pt as she has questions on transportation and scheduling appts? Also pt has new mass and will be going to [facility] so will be having more appts and need for support    Thanks  Sarkis Mendez RN  [phone]

## 2019-05-23 ENCOUNTER — OFFICE VISIT (OUTPATIENT)
Dept: ORTHOPEDICS | Facility: CLINIC | Age: 84
End: 2019-05-23
Payer: COMMERCIAL

## 2019-05-23 ENCOUNTER — ANCILLARY PROCEDURE (OUTPATIENT)
Dept: GENERAL RADIOLOGY | Facility: CLINIC | Age: 84
End: 2019-05-23
Attending: FAMILY MEDICINE
Payer: COMMERCIAL

## 2019-05-23 VITALS
HEIGHT: 63 IN | BODY MASS INDEX: 35.26 KG/M2 | SYSTOLIC BLOOD PRESSURE: 169 MMHG | DIASTOLIC BLOOD PRESSURE: 75 MMHG | WEIGHT: 199 LBS

## 2019-05-23 DIAGNOSIS — G89.29 CHRONIC PAIN OF RIGHT HIP: Primary | ICD-10-CM

## 2019-05-23 DIAGNOSIS — J44.9 CHRONIC OBSTRUCTIVE PULMONARY DISEASE, UNSPECIFIED COPD TYPE (H): Primary | ICD-10-CM

## 2019-05-23 DIAGNOSIS — M25.551 CHRONIC PAIN OF RIGHT HIP: ICD-10-CM

## 2019-05-23 DIAGNOSIS — M16.11 PRIMARY OSTEOARTHRITIS OF RIGHT HIP: ICD-10-CM

## 2019-05-23 DIAGNOSIS — G89.29 CHRONIC PAIN OF RIGHT HIP: ICD-10-CM

## 2019-05-23 DIAGNOSIS — L76.34 POSTOPERATIVE SEROMA OF SUBCUTANEOUS TISSUE AFTER NON-DERMATOLOGIC PROCEDURE: ICD-10-CM

## 2019-05-23 DIAGNOSIS — M25.551 CHRONIC PAIN OF RIGHT HIP: Primary | ICD-10-CM

## 2019-05-23 PROCEDURE — 73502 X-RAY EXAM HIP UNI 2-3 VIEWS: CPT

## 2019-05-23 PROCEDURE — 99213 OFFICE O/P EST LOW 20 MIN: CPT | Performed by: FAMILY MEDICINE

## 2019-05-23 ASSESSMENT — MIFFLIN-ST. JEOR: SCORE: 1311.79

## 2019-05-23 NOTE — PROGRESS NOTES
"Jazmin Clifton  :  1932  DOS: 2019  MRN: [id number]    Sports Medicine Clinic Visit    PCP: Junie Estrella    Jazmin Clifton is a 86 year old female who is seen in consultation at the request of  Mirian Vazquez C.N.P. presenting with chronic right hip pain.    Injury: Gradual onset of chronic right hip pain over past several years.  Pain located over right deep anterior, lateral hip, nonradiating.  Additional Features:  Positive: grinding and weakness.  Symptoms are better with Tylenol, Other medications: prednisone, Roxicodone and Rest.  Symptoms are worse with: walking, going from sit to stand.  Other evaluation and/or treatments so far consists of: Tylenol, Ibuprofen, Other medications: oxycodone, prednisone and Rest, Pain Mgmt consult.  Recent imaging completed: No recent imaging completed.  Prior History of related problems: H/o chronic low back pain and right hip pain.  She has previously completed trochanteric bursa injections in past - cannot recall last time or relief.    Social History: retired - lives in assisted living facility    Review of Systems  Musculoskeletal: as above  Remainder of review of systems is negative including constitutional, CV, pulmonary, GI, Skin and Neurologic except as noted in HPI or medical history.    Past Medical History:   Diagnosis Date     ABDOMINAL PAIN GENERALIZED 3/15/2006    1 month of abdominal pain that llqwhich radiates into her back and chest.  Pt was hospitilzed 2x for the pain at [facility] --pt hopsitized for 3 days.  Rcords not ab=vailable.  She was told either \" twisted intestine or blockage of bowel.  Pt feels it is the hiatal hernia.  Pt hospitilized again a second time  A month ago.  Ct scans x 2 showed \" nothing.\"  Pt had a barium and xrays.  Pt sa     Abdominal pain, generalized 3/15/2006    1 month of abdominal pain that llqwhich radiates into her back and chest.  Pt was hospitilzed 2x for the pain at [facility] " "--pt hopsitized for 3 days.  Rcords not ab=vailable.  She was told either \" twisted intestine or blockage of bowel.  Pt feels it is the hiatal hernia.  Pt hospitilized again a second time  A month ago.  Ct scans x 2 showed \" nothing.\"  Pt had a barium and xrays.  Pt sa     Atherosclerosis of renal artery (H)     Left renal artery stenosis     BENIGN HYPERTENSION 5/1/2006 5/1/2006   Increase catapres to 0.3 mg and decrease clonidine to 0.1 mg daily.  Recheck bp in 1 month.      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     Dyspepsia     Generalized osteoarthrosis, unspecified site     DJD-chronic back pain     Herpes zoster dermatitis of eyelid      Insomnia, unspecified      Lumbago     Chronic back pain     Other chest pain     Atypical Chest Pain     Other specified cardiac dysrhythmias(427.89)     Bradycardia, improved on lower dose beta blockers      Rectocele     Grade 3     Unspecified disorder of kidney and ureter     Renal insufficiency     Unspecified essential hypertension      Past Surgical History:   Procedure Laterality Date     CHOLECYSTECTOMY, LAPOROSCOPIC  1997    Cholecystectomy, Laparoscopic     ENDARTERECTOMY CAROTID Right 8/31/2017    Procedure: ENDARTERECTOMY CAROTID;  RIGHT CAROTID ENDARTERECTOMY WITH EEG;  Surgeon: Hilario Parry MD;  Location:  OR     HYSTERECTOMY, RAYMOND  1982    oophorectomy,RAYMOND     SURGICAL HISTORY OF -       Laminectomy x 3     SURGICAL HISTORY OF -       MCA Aneurysm repair     SURGICAL HISTORY OF -   1996    Bladder suspension (Bladder Repair x2)     SURGICAL HISTORY OF -       Bilateral Hand Surgeries for Arthritis x2     SURGICAL HISTORY OF -       Repair of a " "Cerebral Aneurysm     Objective  /75   Ht 1.6 m (5' 3\")   Wt 90.3 kg (199 lb)   BMI 35.25 kg/m      General: healthy, alert and in no distress    HEENT: no scleral icterus or conjunctival erythema   Skin: no suspicious lesions or rash. No jaundice.   CV: regular rhythm by palpation, 2+ distal pulses, no pedal edema    Resp: normal respiratory effort without conversational dyspnea   Psych: normal mood and affect    Gait: antalgic, modest baseline coordination and balance   Neuro: normal light touch sensory exam of the extremities. Motor strength as noted below     Right hip exam    Inspection:        no edema or ecchymosis in hip area    ROM:       Limited active and passive ROM     Strength:        flexion 3/5       extension 4/5       abduction 4/5       adduction 4/5    Tender:        greater trochanter       SI joint       Anterior and lateral hip    Non Tender:        remainder of hip area       illiac crest    Sensation:        grossly intact in hip and thigh    Skin:       well perfused       capillary refill brisk    Special Tests:        neg (-) SANDY       positive (+) FADIR       positive (+) scour      difficult exam overall due to extreme pain    Radiology  XR images independently visualized and reviewed with patient today in clinic  No clear left hip acute findings, signs of mild/moderate DJD with mild joint space narrowing, hx of Right SATHYA, will follow final radiology read closely    Assessment:  1. Chronic pain of right hip    2. Primary osteoarthritis of right hip    3. Postoperative seroma of subcutaneous tissue after non-dermatologic procedure        Plan:  Discussed the assessment with the patient.  Follow up: tomorrow with Dr Georgette Woodruff for possible Flouro-guided CSI to right hip, with conscious sedation if needed  Unfortunately we were not able to get her comfortably on the exam table to complete physical exam or US exam  Cannot r/o a role of seroma in thigh, injection will hopefully " have good diagnostic value  If no benefit would consider MRI to look for stress reaction, occult fracture, seroma eval  PT options reviewed  Home handouts provided and supportive care reviewed  All questions were answered today  Contact us with additional questions or concerns  Signs and sx of concern reviewed    Thanks very much for sending this nice lady to us, I will keep you updated with her progress      Roberto Ríos DO, CAQ  Primary Care Sports Medicine  [facility]               Disclaimer: This note consists of symbols derived from keyboarding, dictation and/or voice recognition software. As a result, there may be errors in the script that have gone undetected. Please consider this when interpreting information found in this chart.

## 2019-05-23 NOTE — LETTER
"    2019         RE: Jazmin Krishnamurthy [place]  [address]  [facility] [place] [address]        Dear Colleague,    Thank you for referring your patient, Jazmin Clifton, to the [facility]. Please see a copy of my visit note below.    Jazmin Clifton  :  1932  DOS: 2019  MRN: [id number]    Sports Medicine Clinic Visit    PCP: Junie Estrella    Jazmin Clifton is a 86 year old female who is seen in consultation at the request of  Mirian Vazquez C.N.P. presenting with chronic right hip pain.    Injury: Gradual onset of chronic right hip pain over past several years.  Pain located over right deep anterior, lateral hip, nonradiating.  Additional Features:  Positive: grinding and weakness.  Symptoms are better with Tylenol, Other medications: prednisone, Roxicodone and Rest.  Symptoms are worse with: walking, going from sit to stand.  Other evaluation and/or treatments so far consists of: Tylenol, Ibuprofen, Other medications: oxycodone, prednisone and Rest, Pain Mgmt consult.  Recent imaging completed: No recent imaging completed.  Prior History of related problems: H/o chronic low back pain and right hip pain.  She has previously completed trochanteric bursa injections in past - cannot recall last time or relief.    Social History: retired - lives in assisted living facility    Review of Systems  Musculoskeletal: as above  Remainder of review of systems is negative including constitutional, CV, pulmonary, GI, Skin and Neurologic except as noted in HPI or medical history.    Past Medical History:   Diagnosis Date     ABDOMINAL PAIN GENERALIZED 3/15/2006    1 month of abdominal pain that ashleyjesfrederick radiates into her back and chest.  Pt was hospitilzed 2x for the pain at [facility] --pt hopsitized for 3 days.  Rcords not ab=vailable.  She was told either \" twisted intestine or blockage of bowel.  Pt feels it is the hiatal hernia.  Pt " "hospitilized again a second time  A month ago.  Ct scans x 2 showed \" nothing.\"  Pt had a barium and xrays.  Pt sa     Abdominal pain, generalized 3/15/2006    1 month of abdominal pain that bryn radiates into her back and chest.  Pt was hospitilzed 2x for the pain at [facility] --pt hopsitized for 3 days.  Rcords not ab=vailable.  She was told either \" twisted intestine or blockage of bowel.  Pt feels it is the hiatal hernia.  Pt hospitilized again a second time  A month ago.  Ct scans x 2 showed \" nothing.\"  Pt had a barium and xrays.  Pt sa     Atherosclerosis of renal artery (H)     Left renal artery stenosis     BENIGN HYPERTENSION 5/1/2006 5/1/2006   Increase catapres to 0.3 mg and decrease clonidine to 0.1 mg daily.  Recheck bp in 1 month.      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     Dyspepsia     Generalized osteoarthrosis, unspecified site     DJD-chronic back pain     Herpes zoster dermatitis of eyelid      Insomnia, unspecified      Lumbago     Chronic back pain     Other chest pain     Atypical Chest Pain     Other specified cardiac dysrhythmias(427.89)     Bradycardia, improved on lower dose beta blockers      Rectocele     Grade 3     Unspecified disorder of kidney and ureter     Renal insufficiency     Unspecified essential hypertension      Past Surgical History:   Procedure Laterality Date     CHOLECYSTECTOMY, LAPOROSCOPIC  1997    Cholecystectomy, Laparoscopic     ENDARTERECTOMY CAROTID Right 8/31/2017    Procedure: ENDARTERECTOMY CAROTID;  RIGHT CAROTID ENDARTERECTOMY WITH EEG;  Surgeon: Hilario Parry MD;  Location:  OR     HYSTERECTOMY, RAYMOND  1982    oophorectomy,RAYMOND     " "SURGICAL HISTORY OF -       Laminectomy x 3     SURGICAL HISTORY OF -       MCA Aneurysm repair     SURGICAL HISTORY OF -   1996    Bladder suspension (Bladder Repair x2)     SURGICAL HISTORY OF -       Bilateral Hand Surgeries for Arthritis x2     SURGICAL HISTORY OF -       Repair of a Cerebral Aneurysm     Objective  /75   Ht 1.6 m (5' 3\")   Wt 90.3 kg (199 lb)   BMI 35.25 kg/m       General: healthy, alert and in no distress    HEENT: no scleral icterus or conjunctival erythema   Skin: no suspicious lesions or rash. No jaundice.   CV: regular rhythm by palpation, 2+ distal pulses, no pedal edema    Resp: normal respiratory effort without conversational dyspnea   Psych: normal mood and affect    Gait: antalgic, modest baseline coordination and balance   Neuro: normal light touch sensory exam of the extremities. Motor strength as noted below     Right hip exam    Inspection:        no edema or ecchymosis in hip area    ROM:       Limited active and passive ROM     Strength:        flexion 3/5       extension 4/5       abduction 4/5       adduction 4/5    Tender:        greater trochanter       SI joint       Anterior and lateral hip    Non Tender:        remainder of hip area       illiac crest    Sensation:        grossly intact in hip and thigh    Skin:       well perfused       capillary refill brisk    Special Tests:        neg (-) SANDY       positive (+) FADIR       positive (+) scour      difficult exam overall due to extreme pain    Radiology  XR images independently visualized and reviewed with patient today in clinic  No clear left hip acute findings, signs of mild/moderate DJD with mild joint space narrowing, hx of Right SATHYA, will follow final radiology read closely    Assessment:  1. Chronic pain of right hip    2. Primary osteoarthritis of right hip    3. Postoperative seroma of subcutaneous tissue after non-dermatologic procedure        Plan:  Discussed the assessment with the " patient.  Follow up: tomorrow with Dr Georgette Woodruff for possible Flouro-guided CSI to right hip, with conscious sedation if needed  Unfortunately we were not able to get her comfortably on the exam table to complete physical exam or US exam  Cannot r/o a role of seroma in thigh, injection will hopefully have good diagnostic value  If no benefit would consider MRI to look for stress reaction, occult fracture, seroma eval  PT options reviewed  Home handouts provided and supportive care reviewed  All questions were answered today  Contact us with additional questions or concerns  Signs and sx of concern reviewed    Thanks very much for sending this nice lady to us, I will keep you updated with her progress      Roberto Ríos DO, CASEBAS  Primary Care Sports Medicine  [facility]               Disclaimer: This note consists of symbols derived from keyboarding, dictation and/or voice recognition software. As a result, there may be errors in the script that have gone undetected. Please consider this when interpreting information found in this chart.    Again, thank you for allowing me to participate in the care of your patient.        Sincerely,        Roberto Ríos DO

## 2019-05-24 ENCOUNTER — HOSPITAL ENCOUNTER (OUTPATIENT)
Dept: GENERAL RADIOLOGY | Facility: CLINIC | Age: 84
Discharge: HOME | End: 2019-05-24
Attending: ANESTHESIOLOGY | Admitting: ANESTHESIOLOGY
Payer: COMMERCIAL

## 2019-05-24 VITALS
DIASTOLIC BLOOD PRESSURE: 83 MMHG | SYSTOLIC BLOOD PRESSURE: 170 MMHG | OXYGEN SATURATION: 96 % | RESPIRATION RATE: 16 BRPM

## 2019-05-24 DIAGNOSIS — M16.11 PRIMARY OSTEOARTHRITIS OF RIGHT HIP: Primary | ICD-10-CM

## 2019-05-24 DIAGNOSIS — M16.11 PRIMARY OSTEOARTHRITIS OF RIGHT HIP: ICD-10-CM

## 2019-05-24 LAB
ALBUMIN UR-MCNC: NEGATIVE MG/DL
APPEARANCE UR: CLEAR
BILIRUB UR QL STRIP: NEGATIVE
COLOR UR AUTO: YELLOW
GLUCOSE UR STRIP-MCNC: NEGATIVE MG/DL
HGB UR QL STRIP: NEGATIVE
HYALINE CASTS #/AREA URNS LPF: 1 /LPF (ref 0–2)
KETONES UR STRIP-MCNC: NEGATIVE MG/DL
LEUKOCYTE ESTERASE UR QL STRIP: ABNORMAL
MUCOUS THREADS #/AREA URNS LPF: PRESENT /LPF
NITRATE UR QL: NEGATIVE
PH UR STRIP: 6 PH (ref 5–7)
RBC #/AREA URNS AUTO: 4 /HPF (ref 0–2)
SOURCE: ABNORMAL
SP GR UR STRIP: 1.01 (ref 1–1.03)
SQUAMOUS #/AREA URNS AUTO: 1 /HPF (ref 0–1)
UROBILINOGEN UR STRIP-MCNC: 0 MG/DL (ref 0–2)
WBC #/AREA URNS AUTO: 5 /HPF (ref 0–5)

## 2019-05-24 PROCEDURE — 25500064 ZZH RX 255 OP 636: Performed by: ANESTHESIOLOGY

## 2019-05-24 PROCEDURE — 27210202 XR HIP ARTHROGRAM RIGHT: Mod: TC

## 2019-05-24 PROCEDURE — 81001 URINALYSIS AUTO W/SCOPE: CPT | Performed by: NURSE PRACTITIONER

## 2019-05-24 PROCEDURE — 25000128 H RX IP 250 OP 636: Performed by: ANESTHESIOLOGY

## 2019-05-24 PROCEDURE — 87086 URINE CULTURE/COLONY COUNT: CPT | Performed by: NURSE PRACTITIONER

## 2019-05-24 RX ORDER — BUPIVACAINE HYDROCHLORIDE 5 MG/ML
10 INJECTION, SOLUTION PERINEURAL ONCE
Status: COMPLETED | OUTPATIENT
Start: 2019-05-24 | End: 2019-05-24

## 2019-05-24 RX ORDER — LIDOCAINE HYDROCHLORIDE 10 MG/ML
5 INJECTION, SOLUTION EPIDURAL; INFILTRATION; INTRACAUDAL; PERINEURAL ONCE
Status: COMPLETED | OUTPATIENT
Start: 2019-05-24 | End: 2019-05-24

## 2019-05-24 RX ORDER — TRIAMCINOLONE ACETONIDE 40 MG/ML
80 INJECTION, SUSPENSION INTRA-ARTICULAR; INTRAMUSCULAR ONCE
Status: COMPLETED | OUTPATIENT
Start: 2019-05-24 | End: 2019-05-24

## 2019-05-24 RX ADMIN — TRIAMCINOLONE ACETONIDE 40 MG: 40 INJECTION, SUSPENSION INTRA-ARTICULAR; INTRAMUSCULAR at 12:24

## 2019-05-24 RX ADMIN — LIDOCAINE HYDROCHLORIDE 2 ML: 10 INJECTION, SOLUTION EPIDURAL; INFILTRATION; INTRACAUDAL; PERINEURAL at 12:24

## 2019-05-24 RX ADMIN — BUPIVACAINE HYDROCHLORIDE 4 ML: 5 INJECTION, SOLUTION EPIDURAL; INTRACAUDAL; PERINEURAL at 12:24

## 2019-05-24 RX ADMIN — IOHEXOL 1.5 ML: 300 INJECTION, SOLUTION INTRAVENOUS at 12:24

## 2019-05-24 NOTE — DISCHARGE INSTRUCTIONS
[facility]   Procedure Discharge Instructions    Today you saw:    Dr. Farhat Woodruff      You had an: hip injection      Medications used:  Lidocaine   Bupivacaine   Kenalog Omnipaque                Be cautious when walking. Numbness and/or weakness in the lower extremities may occur for up to 6-8 hours after the procedure due to effect of the local anesthetic    Do not drive for 6 hours. The effect of the local anesthetic could slow your reflexes.     You may resume your regular activities after 24 hours    Avoid strenuous activity for the first 24 hours    You may shower, however avoid swimming, tub baths or hot tubs for 24 hours following your procedure    You may have a mild to moderate increase in pain for several days following the injection.    It may take up to 14 days for the steroid medication to start working although you may feel the effect as early as a few days after the procedure.       You may use ice packs for 10-15 minutes, 3 to 4 times a day at the injection site for comfort    Do not use heat to painful areas for 6 to 8 hours. This will give the local anesthetic time to wear off and prevent you from accidentally burning your skin.     Unless you have been directed to avoid the use of anti-inflammatory medications (NSAIDS), you may use medications such as ibuprofen, Aleve or Tylenol for pain control if needed.     If you were fasting, you may resume your normal diet and medications after the procedure    Possible side effects of steroids that you may experience include flushing, elevated blood pressure, increased appetite, mild headaches and restlessness.  All of these symptoms will get better with time.    If you experience any of the following, call the Pain Clinic during work hours at [phone] or the Provider Line after hours at [phone]:  -Fever over 100 degree F  -Swelling, bleeding, redness, drainage, warmth at the injection site  -Progressive weakness or  numbness in your legs or arms  -Loss of bowel or bladder function  -Unusual headache that is not relieved by Tylenol or other pain reliever  -Unusual new onset of pain that is not improving

## 2019-05-24 NOTE — OP NOTE
Pre procedure Diagnosis: right hip arthropathy, right hip osteoarthritis, acute on chronic right hip pain  Post procedure Diagnosis: Same  Procedure performed: right hip joint injection, fluoroscopically guided, contrast controlled  Anesthesia: local  Complications: none  Operators: Farhat Woodruff MD     Indications:   Jazmin Clifton is a 86 year old female was sent by MARY Padilla through Dr. Roberto Ríos for right hip joint injection.  They have a history of severe, acute on chronic right hip pain that is inhibiting range of motion, ambulation, and activity.  Exam shows tenderness at the anterior and posterior aspect of the right hip and at the right hip greater trochanter with severely limited ROM due to pain and they have tried conservative treatment including activity modifications, medications.    PELVIS WITH UNILATERAL HIP ONE VIEW RIGHT  5/23/2019 1:29 PM      HISTORY: Chronic pain of right hip.     COMPARISON: August 17, 2006     FINDINGS: Mild loss of joint space and spurring. There is no acute  fracture or dislocation. There are no worrisome bony lesions.                                                                   IMPRESSION: No acute osseous abnormality demonstrated.     VINNY AYALA MD    Options/alternatives, benefits and risks were discussed with the patient including but not limited to bleeding, infection, nerve injury, reaction to medications, lack of significant pain relief with injection and the patient verbalized understanding and wished to proceed.  Voluntary signed informed consent was obtained.     Vitals were reviewed: Yes  /83 (BP Location: Right arm)   Resp 16   SpO2 96%   Allergies were reviewed:  Yes   Medications were reviewed:  Yes   Pre-procedure pain score: 10/10    Procedure:  The patient's medical history, medications, and allergies were reviewed and reconciled.  After obtaining signed informed consent, the patient was brought into the procedure suite and  was carefully positioned on the procedure table in a supine position with a slight left lateral tilt and pillows beneath the right side of her back and beneath the knees to keep her as comfortable as possible for the procedure.   A Pause for the Cause was performed.  Patient was prepped and draped in the usual sterile fashion.     After identifying the right hip joint, a total of 2 ml of Lidocaine 1% was used to anesthetize the skin and subcutaneous tissue.  Care was taken to locate the femoral pulse, and a lateral approach was taken for the injection. A 25 gauge 3.5 inch spinal needle was advanced under intermittent fluoroscopy until it was felt to enter the Hip joint.  Aspiration was negative.   A total of 1.5 ml of Omnipaque 300 was injected, showing appropriate needle location and adequate spread into the intraarticular space and no intravascular uptake noted.  Omnipaque wasted:  8.5 ml.  Then, 5 ml of a mixture containing 4 ml of 0.5% Bupivicaine with 40 mg of Kenalog was injected into the joint without resistance. The needle was removed, the injection site cleaned and a sterile dressing was applied.  The patient tolerated the procedure well, and was taken to the recovery room.    Images were saved to PACS.    Post-procedure pain score: 0/10 with improved ROM without pain  Follow-up includes:   -f/u phone call in one week  -f/u with referring provider    Farhat Woodruff MD  [facility] [place]

## 2019-05-24 NOTE — IP AVS SNAPSHOT
[facility]  [address] [address]  Phone:  [phone]  Fax:  [phone]                                    After Visit Summary   5/24/2019    Jazmin Clifton    MRN: [id number]           After Visit Summary Signature Page    I have received my discharge instructions, and my questions have been answered. I have discussed any challenges I see with this plan with the nurse or doctor.    ..........................................................................................................................................  Patient/Patient Representative Signature      ..........................................................................................................................................  Patient Representative Print Name and Relationship to Patient    ..................................................               ................................................  Date                                   Time    ..........................................................................................................................................  Reviewed by Signature/Title    ...................................................              ..............................................  Date                                               Time          22EPIC Rev 08/18

## 2019-05-24 NOTE — PROGRESS NOTES
Pre-procedure Intake    Have you been fasting? No     If yes, for how long?     Are you taking a prescribed blood thinner such as coumadin, Plavix, Xarelto?    No    If yes, when did you take your last dose?     Do you take aspirin?  Yes -   ASA    If cervical procedure, have you held aspirin for 6 days?   Yes    Do you have any allergies to contrast dye, iodine, steroid and/or numbing medications?  NO    Are you currently taking antibiotics or have an active infection?  NO    Have you had a fever/elevated temperature within the past week? NO    Are you currently taking oral steroids? NO    Do you have a ? Yes       Are you pregnant or breastfeeding?  Not Applicable    Are the vital signs normal?  Yes

## 2019-05-24 NOTE — IP AVS SNAPSHOT
MRN:[id number]                      After Visit Summary   5/24/2019    Jazmin Clifton    MRN: [id number]           Visit Information        Provider Department      5/24/2019 11:15 AM ALETHA OCONNOR NURSE [facility]           Review of your medicines      UNREVIEWED medicines. Ask your doctor about these medicines       Dose / Directions   ACETAMINOPHEN EXTRA STRENGTH 500 MG tablet  Used for:  DDD (degenerative disc disease), lumbar  Generic drug:  acetaminophen      TAKE TWO TABLETS (1000MG) BY MOUTH THREE TIMES DAILY  Quantity:  120 tablet  Refills:  98     ASPIRIN LOW DOSE 81 MG chewable tablet  Used for:  Congestive heart failure, unspecified HF chronicity, unspecified heart failure type (H)  Generic drug:  aspirin      CHEW AND SWALLOW ONE TABLET BY MOUTH ONCE DAILY  Quantity:  30 tablet  Refills:  11     atorvastatin 40 MG tablet  Commonly known as:  LIPITOR  Used for:  Congestive heart failure, unspecified HF chronicity, unspecified heart failure type (H)      TAKE 1 TABLET BY MOUTH ONCE DAILY  Quantity:  30 tablet  Refills:  98     BIOFREEZE 4 % Gel  Generic drug:  Menthol (Topical Analgesic)      Externally apply topically 4 times daily as needed To left shoulder and right hip  Refills:  0     BREO ELLIPTA 100-25 MCG/INH inhaler  Used for:  Chronic obstructive pulmonary disease, unspecified COPD type (H)  Generic drug:  fluticasone-vilanterol      INHALE 1 PUFF BY MOUTH ONCE DAILY  Quantity:  1 Inhaler  Refills:  11     calcium carbonate 500 MG chewable tablet  Commonly known as:  TUMS      Dose:  1 chew tab  Take 1 chew tab by mouth every hour as needed for heartburn  Refills:  0     DONEPEZIL HCL PO      Dose:  5 mg  Take 5 mg by mouth daily  Refills:  0     DULoxetine 30 MG capsule  Commonly known as:  CYMBALTA  Used for:  Chronic pain syndrome, Depression with anxiety      Take 2 capsules (60 mg) by mouth daily AND 1 capsule (30 mg) At Bedtime.  Quantity:  90  capsule  Refills:  11     ferrous sulfate 325 (65 Fe) MG tablet  Commonly known as:  FEROSUL  Used for:  DDD (degenerative disc disease), lumbar, Anemia of chronic renal failure, stage 4 (severe) (H)      Dose:  325 mg  Take 1 tablet (325 mg) by mouth daily (with breakfast)  Quantity:  60 tablet  Refills:  98     fluticasone 50 MCG/ACT nasal spray  Commonly known as:  FLONASE      Dose:  1 spray  Spray 1 spray into both nostrils 2 times daily  Refills:  0     * furosemide 40 MG tablet  Commonly known as:  LASIX      Dose:  40 mg  Take 40 mg by mouth daily  Refills:  0     * furosemide 40 MG tablet  Commonly known as:  LASIX  Used for:  Congestive heart failure, unspecified HF chronicity, unspecified heart failure type (H)      Dose:  40 mg  Take 1 tablet (40 mg) by mouth 2 times daily  Quantity:  60 tablet  Refills:  11     gabapentin 100 MG capsule  Commonly known as:  NEURONTIN  Used for:  DDD (degenerative disc disease), lumbar, Spinal stenosis of lumbar region, unspecified whether neurogenic claudication present      Dose:  200 mg  Take 2 capsules (200 mg) by mouth 2 times daily  Quantity:  120 capsule  Refills:  11     hypromellose 0.5 % Soln ophthalmic solution  Commonly known as:  ARTIFICIAL TEARS      Dose:  1 drop  Place 1 drop into both eyes every 6 hours as needed for dry eyes  Refills:  0     isradipine 5 MG capsule  Commonly known as:  DYNACIRC      Dose:  5 mg  Take 5 mg by mouth 2 times daily  Refills:  0     levalbuterol 1.25 MG/3ML neb solution  Commonly known as:  XOPENEX      Dose:  1 ampule  Take 1 ampule by nebulization every 4 hours as needed for shortness of breath / dyspnea or wheezing And every 4 hours PRN  Refills:  0     Lidocaine 4 % Patch  Commonly known as:  LIDOCARE      Dose:  1 patch  Place 1 patch onto the skin daily as needed To desired area (shoulder or hip). On for 12hrs, off for 12hrs  Refills:  0     lisinopril 20 MG tablet  Commonly known as:  PRINIVIL/ZESTRIL  Used for:   Congestive heart failure, unspecified HF chronicity, unspecified heart failure type (H)      TAKE 1 TABLET BY MOUTH ONCE DAILY  Quantity:  30 tablet  Refills:  98     MAGNESIUM OXIDE PO      Dose:  250 mg  Take 250 mg by mouth daily  Refills:  0     nystatin 835875 UNIT/GM external powder  Commonly known as:  MYCOSTATIN  Used for:  Rash      APPLY TOPICALLY TO ABDOMEN FOLDS, GROIN, AND BREASTS TWICE DAILY AS NEEDED  Quantity:  60 g  Refills:  97     OMEPRAZOLE PO  Indication:  Gastroesophageal Reflux Disease      Dose:  40 mg  Take 40 mg by mouth every morning  Refills:  0     * oxyCODONE 10 MG 12 hr tablet  Commonly known as:  oxyCONTIN  Used for:  Chronic pain syndrome, DDD (degenerative disc disease), lumbar      Dose:  10 mg  Take 1 tablet (10 mg) by mouth every 12 hours maximum 2 tablet(s) per day  Quantity:  60 tablet  Refills:  0     * oxyCODONE 5 MG tablet  Commonly known as:  ROXICODONE  Used for:  Chronic pain syndrome, DDD (degenerative disc disease), lumbar      Dose:  5 mg  Take 1 tablet (5 mg) by mouth daily as needed for severe pain  Quantity:  30 tablet  Refills:  0     senna-docusate 8.6-50 MG tablet  Commonly known as:  SENOKOT-S/PERICOLACE      Dose:  1 tablet  Take 1 tablet by mouth 2 times daily as needed  Refills:  0     tiZANidine 2 MG tablet  Commonly known as:  ZANAFLEX  Used for:  Chronic pain syndrome      TAKE 1 TABLET BY MOUTH THREE TIMES DAILY AS NEEDED  Quantity:  90 tablet  Refills:  97     vitamin D3 1000 units (25 mcg) tablet  Commonly known as:  CHOLECALCIFEROL  Used for:  Age-related osteoporosis without current pathological fracture      Dose:  1000 Units  Take 1 tablet (1,000 Units) by mouth daily  Quantity:  60 tablet  Refills:  11         * This list has 4 medication(s) that are the same as other medications prescribed for you. Read the directions carefully, and ask your doctor or other care provider to review them with you.            CONTINUE these medicines which have NOT  CHANGED       Dose / Directions   Blood Glucose Monitoring Suppl Aura      2 times daily Call nurse for further directions if blood glucose is less than 80 or greater than 250  Refills:  0              Protect others around you: Learn how to safely use, store and throw away your medicines at www.disposemymeds.org.       Follow-ups after your visit       Your next 10 appointments already scheduled    May 30, 2019  9:15 AM CDT  (Arrive by 9:00 AM)  New Patient Visit with Yesy Fong MD  [facility] ([facility]) [address] [place] [address]  [address] [facility] [address]  [phone]      Jun 18, 2019  2:00 PM CDT  Return Visit with MARY [facility] ([facility]) [address] [facility] [id number] [phone]         Care Instructions       Further instructions from your care team       [facility]   Procedure Discharge Instructions    Today you saw:    Dr. Farhat Woodruff      You had an: hip injection      Medications used:  Lidocaine   Bupivacaine   Kenalog Omnipaque                Be cautious when walking. Numbness and/or weakness in the lower extremities may occur for up to 6-8 hours after the procedure due to effect of the local anesthetic    Do not drive for 6 hours. The effect of the local anesthetic could slow your reflexes.     You may resume your regular activities after 24 hours    Avoid strenuous activity for the first 24 hours    You may shower, however avoid swimming, tub baths or hot tubs for 24 hours following your procedure    You may have a mild to moderate increase in pain for several days following the injection.    It may take up to 14 days for the steroid medication to start working although you may feel the effect as early as a few days after the procedure.       You may use ice packs for 10-15 minutes, 3 to 4 times a  day at the injection site for comfort    Do not use heat to painful areas for 6 to 8 hours. This will give the local anesthetic time to wear off and prevent you from accidentally burning your skin.     Unless you have been directed to avoid the use of anti-inflammatory medications (NSAIDS), you may use medications such as ibuprofen, Aleve or Tylenol for pain control if needed.     If you were fasting, you may resume your normal diet and medications after the procedure    Possible side effects of steroids that you may experience include flushing, elevated blood pressure, increased appetite, mild headaches and restlessness.  All of these symptoms will get better with time.    If you experience any of the following, call the Pain Clinic during work hours at [phone] or the Provider Line after hours at [phone]:  -Fever over 100 degree F  -Swelling, bleeding, redness, drainage, warmth at the injection site  -Progressive weakness or numbness in your legs or arms  -Loss of bowel or bladder function  -Unusual headache that is not relieved by Tylenol or other pain reliever  -Unusual new onset of pain that is not improving          Additional Information About Your Visit       Care EveryWhere ID    This is your Care EveryWhere ID. This could be used by other organizations to access your [facility] medical records  [id number]       Your Vitals Were     Blood Pressure   170/83 (BP Location: Right arm)    Respirations   16    Pulse Oximetry   96%            Primary Care Provider Office Phone # Fax MARY Galindo [phone]      Equal Access to Services    [facility] : Hadii alma delia Marquez, waaxda luqadaha, qaybta kaalmada andrew cat . So [facility] [phone].    ATENCIÓN: Si habla español, tiene a ortega disposición servicios gratuitos de asistencia lingüística. Amira al [phone].    We comply with applicable federal civil rights  laws and [place] laws. We do not discriminate on the basis of race, color, national origin, age, disability, sex, sexual orientation, or gender identity.           Thank you!    Thank you for choosing [facility] for your care. Our goal is always to provide you with excellent care. Hearing back from our patients is one way we can continue to improve our services. Please take a few minutes to complete the written survey that you may receive in the mail after you visit with us. Thank you!            Medication List      Medications          Morning Afternoon Evening Bedtime As Needed    Blood Glucose Monitoring Suppl Aura  INSTRUCTIONS:  2 times daily Call nurse for further directions if blood glucose is less than 80 or greater than 250                       ASK your doctor about these medications          Morning Afternoon Evening Bedtime As Needed    ACETAMINOPHEN EXTRA STRENGTH 500 MG tablet  INSTRUCTIONS:  TAKE TWO TABLETS (1000MG) BY MOUTH THREE TIMES DAILY  Generic drug:  acetaminophen                     ASPIRIN LOW DOSE 81 MG chewable tablet  INSTRUCTIONS:  CHEW AND SWALLOW ONE TABLET BY MOUTH ONCE DAILY  Generic drug:  aspirin                     atorvastatin 40 MG tablet  Also known as:  LIPITOR  INSTRUCTIONS:  TAKE 1 TABLET BY MOUTH ONCE DAILY                     BIOFREEZE 4 % Gel  INSTRUCTIONS:  Externally apply topically 4 times daily as needed To left shoulder and right hip  Generic drug:  Menthol (Topical Analgesic)                     BREO ELLIPTA 100-25 MCG/INH inhaler  INSTRUCTIONS:  INHALE 1 PUFF BY MOUTH ONCE DAILY  Generic drug:  fluticasone-vilanterol                     calcium carbonate 500 MG chewable tablet  Also known as:  TUMS  INSTRUCTIONS:  Take 1 chew tab by mouth every hour as needed for heartburn                     DONEPEZIL HCL PO  INSTRUCTIONS:  Take 5 mg by mouth daily                     DULoxetine 30 MG capsule  Also known as:  CYMBALTA  INSTRUCTIONS:  Take 2 capsules (60 mg) by  mouth daily AND 1 capsule (30 mg) At Bedtime.                     ferrous sulfate 325 (65 Fe) MG tablet  Also known as:  FEROSUL  INSTRUCTIONS:  Take 1 tablet (325 mg) by mouth daily (with breakfast)                     fluticasone 50 MCG/ACT nasal spray  Also known as:  FLONASE  INSTRUCTIONS:  Spray 1 spray into both nostrils 2 times daily                     * furosemide 40 MG tablet  Also known as:  LASIX  INSTRUCTIONS:  Take 40 mg by mouth daily                     * furosemide 40 MG tablet  Also known as:  LASIX  INSTRUCTIONS:  Take 1 tablet (40 mg) by mouth 2 times daily                     gabapentin 100 MG capsule  Also known as:  NEURONTIN  INSTRUCTIONS:  Take 2 capsules (200 mg) by mouth 2 times daily                     hypromellose 0.5 % Soln ophthalmic solution  Also known as:  ARTIFICIAL TEARS  INSTRUCTIONS:  Place 1 drop into both eyes every 6 hours as needed for dry eyes                     isradipine 5 MG capsule  Also known as:  DYNACIRC  INSTRUCTIONS:  Take 5 mg by mouth 2 times daily                     levalbuterol 1.25 MG/3ML neb solution  Also known as:  XOPENEX  INSTRUCTIONS:  Take 1 ampule by nebulization every 4 hours as needed for shortness of breath / dyspnea or wheezing And every 4 hours PRN                     Lidocaine 4 % Patch  Also known as:  LIDOCARE  INSTRUCTIONS:  Place 1 patch onto the skin daily as needed To desired area (shoulder or hip). On for 12hrs, off for 12hrs                     lisinopril 20 MG tablet  Also known as:  PRINIVIL/ZESTRIL  INSTRUCTIONS:  TAKE 1 TABLET BY MOUTH ONCE DAILY                     MAGNESIUM OXIDE PO  INSTRUCTIONS:  Take 250 mg by mouth daily                     nystatin 571367 UNIT/GM external powder  Also known as:  MYCOSTATIN  INSTRUCTIONS:  APPLY TOPICALLY TO ABDOMEN FOLDS, GROIN, AND BREASTS TWICE DAILY AS NEEDED                     OMEPRAZOLE PO  INSTRUCTIONS:  Take 40 mg by mouth every morning  Reason for med:  Gastroesophageal Reflux  Disease                     * oxyCODONE 10 MG 12 hr tablet  Also known as:  oxyCONTIN  INSTRUCTIONS:  Take 1 tablet (10 mg) by mouth every 12 hours maximum 2 tablet(s) per day                     * oxyCODONE 5 MG tablet  Also known as:  ROXICODONE  INSTRUCTIONS:  Take 1 tablet (5 mg) by mouth daily as needed for severe pain                     senna-docusate 8.6-50 MG tablet  Also known as:  SENOKOT-S/PERICOLACE  INSTRUCTIONS:  Take 1 tablet by mouth 2 times daily as needed                     tiZANidine 2 MG tablet  Also known as:  ZANAFLEX  INSTRUCTIONS:  TAKE 1 TABLET BY MOUTH THREE TIMES DAILY AS NEEDED                     vitamin D3 1000 units (25 mcg) tablet  Also known as:  CHOLECALCIFEROL  INSTRUCTIONS:  Take 1 tablet (1,000 Units) by mouth daily                        * This list has 4 medication(s) that are the same as other medications prescribed for you. Read the directions carefully, and ask your doctor or other care provider to review them with you.

## 2019-05-25 LAB
BACTERIA SPEC CULT: NORMAL
Lab: NORMAL
SPECIMEN SOURCE: NORMAL

## 2019-05-28 NOTE — PROGRESS NOTES
[facility] Care Coordination Contact    2nd Attempt: Signed Letter not received from Yessy on [facility], resent per process.     Amira Carrizales  Case Management Specialist   [facility]   [phone]

## 2019-05-29 DIAGNOSIS — G89.4 CHRONIC PAIN SYNDROME: Primary | ICD-10-CM

## 2019-05-29 RX ORDER — OXYCODONE HYDROCHLORIDE 5 MG/1
TABLET ORAL
Qty: 60 TABLET | Refills: 0 | Status: SHIPPED | OUTPATIENT
Start: 2019-05-29 | End: 2019-06-04

## 2019-05-30 ENCOUNTER — OFFICE VISIT (OUTPATIENT)
Dept: UROLOGY | Facility: CLINIC | Age: 84
End: 2019-05-30
Attending: OBSTETRICS & GYNECOLOGY
Payer: COMMERCIAL

## 2019-05-30 VITALS
DIASTOLIC BLOOD PRESSURE: 65 MMHG | BODY MASS INDEX: 35.25 KG/M2 | SYSTOLIC BLOOD PRESSURE: 133 MMHG | HEART RATE: 73 BPM | WEIGHT: 199 LBS

## 2019-05-30 DIAGNOSIS — N36.8 MASS OF URETHRA: ICD-10-CM

## 2019-05-30 DIAGNOSIS — R31.0 GROSS HEMATURIA: Primary | ICD-10-CM

## 2019-05-30 LAB — COPATH REPORT: NORMAL

## 2019-05-30 PROCEDURE — 88112 CYTOPATH CELL ENHANCE TECH: CPT | Performed by: UROLOGY

## 2019-05-30 RX ORDER — CEFAZOLIN SODIUM 1 G/50ML
1 INJECTION, SOLUTION INTRAVENOUS SEE ADMIN INSTRUCTIONS
Status: CANCELLED | OUTPATIENT
Start: 2019-05-30

## 2019-05-30 RX ORDER — CEFAZOLIN SODIUM 2 G/50ML
2 SOLUTION INTRAVENOUS
Status: CANCELLED | OUTPATIENT
Start: 2019-05-30

## 2019-05-30 RX ORDER — LIDOCAINE HYDROCHLORIDE 20 MG/ML
JELLY TOPICAL ONCE
Status: COMPLETED | OUTPATIENT
Start: 2019-05-30 | End: 2019-05-30

## 2019-05-30 RX ORDER — ASCORBIC ACID 500 MG
TABLET ORAL
Refills: 0 | COMMUNITY
Start: 2018-12-13 | End: 2019-07-30

## 2019-05-30 RX ADMIN — LIDOCAINE HYDROCHLORIDE: 20 JELLY TOPICAL at 11:21

## 2019-05-30 ASSESSMENT — ENCOUNTER SYMPTOMS
DIFFICULTY URINATING: 1
NAIL CHANGES: 0
POSTURAL DYSPNEA: 0
POLYPHAGIA: 0
BLOATING: 0
DEPRESSION: 0
FEVER: 0
ARTHRALGIAS: 1
SMELL DISTURBANCE: 0
EYE REDNESS: 0
TREMORS: 0
HEARTBURN: 1
SORE THROAT: 0
LEG SWELLING: 0
MEMORY LOSS: 0
BRUISES/BLEEDS EASILY: 0
EXERCISE INTOLERANCE: 0
NECK MASS: 0
COUGH: 0
ALTERED TEMPERATURE REGULATION: 0
DECREASED APPETITE: 0
LEG PAIN: 0
TASTE DISTURBANCE: 0
SHORTNESS OF BREATH: 0
SPEECH CHANGE: 0
TROUBLE SWALLOWING: 0
HEMOPTYSIS: 0
SLEEP DISTURBANCES DUE TO BREATHING: 0
NERVOUS/ANXIOUS: 0
TINGLING: 0
WEIGHT GAIN: 0
POLYDIPSIA: 0
POOR WOUND HEALING: 0
COUGH DISTURBING SLEEP: 0
HEADACHES: 0
INCREASED ENERGY: 0
EYE PAIN: 0
FATIGUE: 0
DECREASED LIBIDO: 0
CHILLS: 0
SEIZURES: 0
BACK PAIN: 1
PARALYSIS: 0
BOWEL INCONTINENCE: 0
EXTREMITY NUMBNESS: 0
DYSPNEA ON EXERTION: 0
SYNCOPE: 0
CLAUDICATION: 0
INSOMNIA: 0
ABDOMINAL PAIN: 0
HEMATURIA: 1
MUSCLE WEAKNESS: 1
SINUS CONGESTION: 0
HOT FLASHES: 0
PANIC: 0
BREAST PAIN: 0
PALPITATIONS: 0
RESPIRATORY PAIN: 0
SNORES LOUDLY: 0
DYSURIA: 1
DOUBLE VISION: 0
DIZZINESS: 0
NAUSEA: 1
EYE IRRITATION: 0
DISTURBANCES IN COORDINATION: 0
NIGHT SWEATS: 1
WEAKNESS: 0
SINUS PAIN: 0
RECTAL PAIN: 0
SWOLLEN GLANDS: 0
CONSTIPATION: 1
DIARRHEA: 1
SPUTUM PRODUCTION: 0
HALLUCINATIONS: 0
MYALGIAS: 1
VOMITING: 0
HYPOTENSION: 0
LOSS OF CONSCIOUSNESS: 0
HYPERTENSION: 0
SKIN CHANGES: 0
NUMBNESS: 0
ORTHOPNEA: 0
BLOOD IN STOOL: 0
STIFFNESS: 1
JAUNDICE: 0
JOINT SWELLING: 0
DECREASED CONCENTRATION: 0
HOARSE VOICE: 1
WEIGHT LOSS: 0
NECK PAIN: 0
EYE WATERING: 0
LIGHT-HEADEDNESS: 0
TACHYCARDIA: 0
MUSCLE CRAMPS: 1
WHEEZING: 0
FLANK PAIN: 0
BREAST MASS: 0

## 2019-05-30 ASSESSMENT — PATIENT HEALTH QUESTIONNAIRE - PHQ9
SUM OF ALL RESPONSES TO PHQ QUESTIONS 1-9: 0
SUM OF ALL RESPONSES TO PHQ QUESTIONS 1-9: 0

## 2019-05-30 ASSESSMENT — PAIN SCALES - GENERAL: PAINLEVEL: MILD PAIN (2)

## 2019-05-30 NOTE — NURSING NOTE
Invasive Procedure Safety Checklist:    Procedure: Cystoscopy    Action: Complete sections and checkboxes as appropriate.    Pre-procedure:  1. Patient ID Verified with 2 identifiers (Debby and  or MRN) : YES    2. Procedure and site verified with patient/designee (when able) : YES    3. Accurate consent documentation in medical record : YES    4. H&P (or appropriate assessment) documented in medical record : YES  H&P must be up to 30 days prior to procedure an updated within 24 hours of                 Procedure as applicable.     5. Relevant diagnostic and radiology test results appropriately labeled and displayed as applicable : YES    6. Blood products, implants, devices, and/or special equipment available for the procedure as applicable : YES    7. Procedure site(s) marked with provider initials [Exclusions: None] : NO    8. Marking not required. Reason : Yes  Procedure does not require site marking    Time Out:     Time-Out performed immediately prior to starting procedure, including verbal and active participation of all team members addressing: YES    1. Correct patient identity.  2. Confirmed that the correct side and site are marked.  3. An accurate procedure to be done.  4. Agreement on the procedure to be done.  5. Correct patient position.  6. Relevant images and results are properly labeled and appropriately displayed.  7. The need to administer antibiotics or fluids for irrigation purposes during the procedure as applicable.  8. Safety precautions based on patient history or medication use.    During Procedure: Verification of correct person, site, and procedure occurs any time the responsibility for care of the patient is transferred to another member of the care team.      The following medication was given:     MEDICATION:  Lidocaine 2% jelly  ROUTE: topical  SITE: urethra via the meatus  DOSE: 10 mL  LOT #: [id number]  : [employer]  EXPIRATION DATE:   NDC#: [address]-09   Was there  drug waste? No    Prior to administration, verified patient identity using patient's name and date of birth.  Due to administration, patient instructed to remain in clinic for 15 minutes  afterwards, and to report any adverse reaction to me immediately.      Drug Amount Wasted:  None.  Vial/Syringe: Single dose vial    Dinorah Foster CMA  May 30, 2019

## 2019-05-30 NOTE — LETTER
"5/30/2019       RE: Jazmin [place]  [address]  [facility] [place] [address]     Dear Colleague,    Thank you for referring your patient, Jazmin Clifton, to the [facility] UROLOGY AND INST FOR PROSTATE AND UROLOGIC CANCERS at [facility]. Please see a copy of my visit note below.    May 30, 2019    Referring Provider: Qian Gutierrez MD  [address] [place] [address]    Primary Care Provider: Junie Estrella    CC: Urethral mass    HPI:  Jazmin Clifton is a 86 year old female who presents for evaluation of her pelvic floor symptoms.  She is here because she has blood and pain two weeks ago and had exam for necrotic urethral mass.  Wears depends because once she gets the urge she takes awhile to get to the washroom with walker or electric chair.  Has some intermittent constipation    Had a hysterectomy but she does not know why, for some tumor but she does not think is was cancer.  She has had a couple bladder suspension surgeries with Dr Klein    Her son is with her today.  Patient lives at an assisted living facility with 24hr nursing care    Past Medical History:   Diagnosis Date     ABDOMINAL PAIN GENERALIZED 3/15/2006    1 month of abdominal pain that llqwhich radiates into her back and chest.  Pt was hospitilzed 2x for the pain at [facility] --pt hopsitized for 3 days.  Rcords not ab=vailable.  She was told either \" twisted intestine or blockage of bowel.  Pt feels it is the hiatal hernia.  Pt hospitilized again a second time  A month ago.  Ct scans x 2 showed \" nothing.\"  Pt had a barium and xrays.  Pt sa     Abdominal pain, generalized 3/15/2006    1 month of abdominal pain that llqwhich radiates into her back and chest.  Pt was hospitilzed 2x for the pain at [facility] --pt hopsitized for 3 days.  Rcords not ab=vailable.  She was told either \" twisted intestine or blockage of bowel.  Pt feels it is the hiatal " "hernia.  Pt hospitilized again a second time  A month ago.  Ct scans x 2 showed \" nothing.\"  Pt had a barium and xrays.  Pt sa     Atherosclerosis of renal artery (H)     Left renal artery stenosis     BENIGN HYPERTENSION 5/1/2006 5/1/2006   Increase catapres to 0.3 mg and decrease clonidine to 0.1 mg daily.  Recheck bp in 1 month.      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     Dyspepsia     Generalized osteoarthrosis, unspecified site     DJD-chronic back pain     Herpes zoster dermatitis of eyelid      Insomnia, unspecified      Lumbago     Chronic back pain     Other chest pain     Atypical Chest Pain     Other specified cardiac dysrhythmias(427.89)     Bradycardia, improved on lower dose beta blockers      Rectocele     Grade 3     Unspecified disorder of kidney and ureter     Renal insufficiency     Unspecified essential hypertension      Past Surgical History:   Procedure Laterality Date     CHOLECYSTECTOMY, LAPOROSCOPIC  1997    Cholecystectomy, Laparoscopic     ENDARTERECTOMY CAROTID Right 8/31/2017    Procedure: ENDARTERECTOMY CAROTID;  RIGHT CAROTID ENDARTERECTOMY WITH EEG;  Surgeon: Hilario Parry MD;  Location: [facility] OR     HYSTERECTOMY, RAYMOND  1982    oophorectomy,RAYMOND     SURGICAL HISTORY OF -       Laminectomy x 3     SURGICAL HISTORY OF -       MCA Aneurysm repair     SURGICAL HISTORY OF -   1996    Bladder suspension (Bladder Repair x2)     SURGICAL HISTORY OF -       Bilateral Hand Surgeries for Arthritis x2     SURGICAL HISTORY OF -       Repair of a Cerebral Aneurysm     Social History     Socioeconomic History     Marital status:      Spouse name: Not on file     Number of children: Not " on file     Years of education: Not on file     Highest education level: Not on file   Occupational History     Not on file   Social Needs     Financial resource strain: Not on file     Food insecurity:     Worry: Not on file     Inability: Not on file     Transportation needs:     Medical: Not on file     Non-medical: Not on file   Tobacco Use     Smoking status: Former Smoker     Last attempt to quit: 1992     Years since quittin.4     Smokeless tobacco: Never Used   Substance and Sexual Activity     Alcohol use: Yes     Comment: wine occas.     Drug use: No     Sexual activity: Never   Lifestyle     Physical activity:     Days per week: Not on file     Minutes per session: Not on file     Stress: Not on file   Relationships     Social connections:     Talks on phone: Not on file     Gets together: Not on file     Attends Gnosticist service: Not on file     Active member of club or organization: Not on file     Attends meetings of clubs or organizations: Not on file     Relationship status: Not on file     Intimate partner violence:     Fear of current or ex partner: Not on file     Emotionally abused: Not on file     Physically abused: Not on file     Forced sexual activity: Not on file   Other Topics Concern     Not on file   Social History Narrative     Not on file     Family History   Problem Relation Age of Onset     Cancer Mother         stomache     Cerebrovascular Disease Father      Heart Disease Father      Cancer Brother         lymphoma     Eye Disorder Son      Asthma Son      Diabetes Son      Gastrointestinal Disease Son         no control over bladder or bowels     C.A.D. No family hx of      Hypertension No family hx of      Breast Cancer No family hx of      Cancer - colorectal No family hx of      Prostate Cancer No family hx of      Review of Systems     Constitutional:  Positive for night sweats. Negative for fever, chills, weight loss, weight gain, fatigue, decreased appetite, recent  stressors, height loss, post-operative complications, incisional pain, hallucinations, increased energy, hyperactivity and confused.   HENT:  Positive for tinnitus, hoarse voice, gum tenderness and dry mouth. Negative for ear pain, hearing loss, nosebleeds, trouble swallowing, mouth sores, sore throat, ear discharge, tooth pain, taste disturbance, smell disturbance, hearing aid, bleeding gums, sinus pain, sinus congestion and neck mass.    Eyes:  Negative for double vision, pain, redness, eye pain, decreased vision, eye watering, eye bulging, eye dryness, flashing lights, spots, floaters, strabismus, tunnel vision, jaundice and eye irritation.   Respiratory:   Negative for cough, hemoptysis, sputum production, shortness of breath, wheezing, sleep disturbances due to breathing, snores loudly, respiratory pain, dyspnea on exertion, cough disturbing sleep and postural dyspnea.    Cardiovascular:  Positive for edema. Negative for chest pain, dyspnea on exertion, palpitations, orthopnea, claudication, leg swelling, fingers/toes turn blue, hypertension, hypotension, syncope, history of heart murmur, chest pain on exertion, chest pain at rest, pacemaker, few scattered varicosities, leg pain, sleep disturbances due to breathing, tachycardia, light-headedness and exercise intolerance.   Gastrointestinal:  Positive for heartburn, nausea, diarrhea and constipation. Negative for vomiting, abdominal pain, blood in stool, melena, rectal pain, bloating, bowel incontinence, jaundice, coffee ground emesis and change in stool.   Genitourinary:  Positive for bladder incontinence, dysuria, urgency, hematuria and difficulty urinating. Negative for flank pain, vaginal discharge, genital sores, dyspareunia, decreased libido, nocturia, voiding less frequently, arousal difficulty, abnormal vaginal bleeding, excessive menstruation, menstrual changes, hot flashes, vaginal dryness and postmenopausal bleeding.   Musculoskeletal:  Positive for  myalgias, back pain, arthralgias, stiffness, muscle cramps and muscle weakness. Negative for joint swelling, neck pain, bone pain and fracture.   Skin:  Negative for nail changes, itching, poor wound healing, rash, hair changes, skin changes, acne, warts, poor wound healing, scarring, flaky skin, Raynaud's phenomenon, sensitivity to sunlight and skin thickening.   Neurological:  Negative for dizziness, tingling, tremors, speech change, seizures, loss of consciousness, weakness, light-headedness, numbness, headaches, disturbances in coordination, extremity numbness, memory loss, difficulty walking and paralysis.   Endo/Heme:  Negative for anemia, swollen glands and bruises/bleeds easily.   Psychiatric/Behavioral:  Negative for depression, hallucinations, memory loss, decreased concentration, mood swings and panic attacks.    Breast:  Negative for breast discharge, breast mass, breast pain and nipple retraction.   Endocrine:  Negative for altered temperature regulation, polyphagia, polydipsia, unwanted hair growth and change in facial hair.    Allergies   Allergen Reactions     Accupril      Ace Inhibitors Unknown     Accupril     Augmentin Unknown     Blood-Group Specific Substance      Other reaction(s): *Unknown  Patient has a Non-specific antibody. Blood Product orders may be delayed.  Draw one red top and two purple top tubes for ALL Type and Screen/ Type and Crossmatch orders.      Levofloxacin Unknown, Muscle Pain (Myalgia) and Other (See Comments)     Comment: myalgias, Description:   Pt prescribed ciprofloxacin in Jan 2018 (no rxn documented)  Myalgias       Morphine Other (See Comments)     hallucinations     Nitrofurantoin Nausea and Vomiting and Unknown     Norvasc [Amlodipine Besylate]      Leg swelling       Quinapril Other (See Comments) and Unknown     Hypertension. 3.29.18 - Takes and tolerates lisinopril  Patient reports HTN with as reaction to this medication       Current Outpatient Medications    Medication     ACETAMINOPHEN EXTRA STRENGTH 500 MG tablet     ASPIRIN LOW DOSE 81 MG chewable tablet     atorvastatin (LIPITOR) 40 MG tablet     BREO ELLIPTA 100-25 MCG/INH inhaler     calcium carbonate (TUMS) 500 MG chewable tablet     DONEPEZIL HCL PO     DULoxetine (CYMBALTA) 30 MG capsule     ferrous sulfate (FEROSUL) 325 (65 Fe) MG tablet     fluticasone (FLONASE) 50 MCG/ACT nasal spray     furosemide (LASIX) 40 MG tablet     gabapentin (NEURONTIN) 100 MG capsule     hypromellose (ARTIFICIAL TEARS) 0.5 % SOLN ophthalmic solution     isradipine (DYNACIRC) 5 MG capsule     levalbuterol (XOPENEX) 1.25 MG/3ML neb solution     Lidocaine (LIDOCARE) 4 % Patch     lisinopril (PRINIVIL/ZESTRIL) 20 MG tablet     MAGNESIUM OXIDE PO     Menthol, Topical Analgesic, (BIOFREEZE) 4 % GEL     nystatin (MYCOSTATIN) 042569 UNIT/GM external powder     OMEPRAZOLE PO     oxyCODONE (ROXICODONE) 5 MG tablet     senna-docusate (SENOKOT-S/PERICOLACE) 8.6-50 MG tablet     tiZANidine (ZANAFLEX) 2 MG tablet     vitamin C (ASCORBIC ACID) 500 MG tablet     vitamin D3 (CHOLECALCIFEROL) 1000 units (25 mcg) tablet     Blood Glucose Monitoring Suppl CORY     furosemide (LASIX) 40 MG tablet     oxyCODONE (OXYCONTIN) 10 MG 12 hr tablet     oxyCODONE (ROXICODONE) 5 MG tablet     No current facility-administered medications for this visit.      /65   Pulse 73   Wt 90.3 kg (199 lb)   BMI 35.25 kg/m    No LMP recorded. Patient has had a hysterectomy. Body mass index is 35.25 kg/m .  She is alert and oriented.  She is well groomed, comfortable in no acute distress.  Eyes have anicteric sclerae, moist conjunctivae.  Normal mood and affect.   Normocephalic, atraumatic without masses, lesions, obvious abnormalities.  Non-labored breathing.  Abdomen is soft, non-tender, non-distended, no CVAT, no organomegaly.  Her urethra has a 2 cm red fleshy tender protrusion from her urethra.  Negative ESST. Attempt at cystoscopy was aborted secondary to her  significant pain    Urine dip 5/24 negative    A/P: Jazmin Clifton is a 86 year old F with urethral mass, gross hematuria    Urine cytology    MR pelvis    At this time recommend cystoscopy with possible biopsy and removal of the urethral lesion.  We discussed that the risks to the procedure include but not limited to cardiovascular risks of anesthesia, nerve injury, bleeding, infection, injury to adjacent organs including bowel, bladder, and blood vessels, conversion to open procedure, de devendra or worsening stress incontinence or urge incontinence., need for further procedures depending on pathology.  I discussed with her and her son that she would be going home with a catheter after the surgery.  Patient expressed understanding and agreeable to proceed.    60 minutes were spent with the patient today, > 50% in counseling and coordination of care    Yesy Fong MD MPH    Urology    CC  Patient Care Team:  Junie Estrella APRN CNP as PCP - General (Nurse Practitioner - Gerontology)  Toño Shafer MD as MD (Family Practice)  Aliyah Milan as Medical Assistant (Gerontology)  Sarah Mccabe as Case Management Specialist (Primary Care - CC)  Laura George as Case Management Specialist (Primary Care - CC)  Mirian Weldon LSW as Lead Care Coordinator (Primary Care - CC)  Junie Estrella APRN CNP as Assigned PCP  Uma Webber RPH as Pharmacist (Pharmacist)  [facility]  Lincoln Brady MD as MD (Family Practice)  Qian Nino MD as MD (OB/Gyn)  Yesy Fong MD as MD (Urology)  QIAN NINO

## 2019-05-30 NOTE — PATIENT INSTRUCTIONS
Please do the MRI    We will work on coordinating surgery    It was a pleasure meeting with you today.  Thank you for allowing me and my team the privilege of caring for you today.  YOU are the reason we are here, and I truly hope we provided you with the excellent service you deserve.  Please let us know if there is anything else we can do for you so that we can be sure you are leaving completely satisfied with your care experience.

## 2019-05-30 NOTE — PROGRESS NOTES
"May 30, 2019    Referring Provider: Qian Gutierrez MD  [address] [place] [address]    Primary Care Provider: Junie Estrella    CC: Urethral mass    HPI:  Jazmin Clifton is a 86 year old female who presents for evaluation of her pelvic floor symptoms.  She is here because she has blood and pain two weeks ago and had exam for necrotic urethral mass.  Wears depends because once she gets the urge she takes awhile to get to the washroom with walker or electric chair.  Has some intermittent constipation    Had a hysterectomy but she does not know why, for some tumor but she does not think is was cancer.  She has had a couple bladder suspension surgeries with Dr Klein    Her son is with her today.  Patient lives at an assisted living facility with 24hr nursing care    Past Medical History:   Diagnosis Date     ABDOMINAL PAIN GENERALIZED 3/15/2006    1 month of abdominal pain that llqwhich radiates into her back and chest.  Pt was hospitilzed 2x for the pain at [facility] --pt hopsitized for 3 days.  Rcords not ab=vailable.  She was told either \" twisted intestine or blockage of bowel.  Pt feels it is the hiatal hernia.  Pt hospitilized again a second time  A month ago.  Ct scans x 2 showed \" nothing.\"  Pt had a barium and xrays.  Pt sa     Abdominal pain, generalized 3/15/2006    1 month of abdominal pain that llqwhich radiates into her back and chest.  Pt was hospitilzed 2x for the pain at [facility] --pt hopsitized for 3 days.  Rcords not ab=vailable.  She was told either \" twisted intestine or blockage of bowel.  Pt feels it is the hiatal hernia.  Pt hospitilized again a second time  A month ago.  Ct scans x 2 showed \" nothing.\"  Pt had a barium and xrays.  Pt sa     Atherosclerosis of renal artery (H)     Left renal artery stenosis     BENIGN HYPERTENSION 5/1/2006 5/1/2006   Increase catapres to 0.3 mg and decrease clonidine to 0.1 mg daily.  Recheck bp in 1 month.      " 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     Dyspepsia     Generalized osteoarthrosis, unspecified site     DJD-chronic back pain     Herpes zoster dermatitis of eyelid      Insomnia, unspecified      Lumbago     Chronic back pain     Other chest pain     Atypical Chest Pain     Other specified cardiac dysrhythmias(427.89)     Bradycardia, improved on lower dose beta blockers      Rectocele     Grade 3     Unspecified disorder of kidney and ureter     Renal insufficiency     Unspecified essential hypertension      Past Surgical History:   Procedure Laterality Date     CHOLECYSTECTOMY, LAPOROSCOPIC  1997    Cholecystectomy, Laparoscopic     ENDARTERECTOMY CAROTID Right 8/31/2017    Procedure: ENDARTERECTOMY CAROTID;  RIGHT CAROTID ENDARTERECTOMY WITH EEG;  Surgeon: Hilario Parry MD;  Location: [facility]     HYSTERECTOMY, RAYMOND  1982    oophorectomy,RAYMOND     SURGICAL HISTORY OF -       Laminectomy x 3     SURGICAL HISTORY OF -       MCA Aneurysm repair     SURGICAL HISTORY OF -   1996    Bladder suspension (Bladder Repair x2)     SURGICAL HISTORY OF -       Bilateral Hand Surgeries for Arthritis x2     SURGICAL HISTORY OF -       Repair of a Cerebral Aneurysm     Social History     Socioeconomic History     Marital status:      Spouse name: Not on file     Number of children: Not on file     Years of education: Not on file     Highest education level: Not on file   Occupational History     Not on file   Social Needs     Financial resource strain: Not on file     Food insecurity:     Worry: Not on file     Inability: Not on file     Transportation needs:     Medical: Not on file     Non-medical: Not on file   Tobacco  Use     Smoking status: Former Smoker     Last attempt to quit: 1992     Years since quittin.4     Smokeless tobacco: Never Used   Substance and Sexual Activity     Alcohol use: Yes     Comment: wine occas.     Drug use: No     Sexual activity: Never   Lifestyle     Physical activity:     Days per week: Not on file     Minutes per session: Not on file     Stress: Not on file   Relationships     Social connections:     Talks on phone: Not on file     Gets together: Not on file     Attends Orthodox service: Not on file     Active member of club or organization: Not on file     Attends meetings of clubs or organizations: Not on file     Relationship status: Not on file     Intimate partner violence:     Fear of current or ex partner: Not on file     Emotionally abused: Not on file     Physically abused: Not on file     Forced sexual activity: Not on file   Other Topics Concern     Not on file   Social History Narrative     Not on file     Family History   Problem Relation Age of Onset     Cancer Mother         stomache     Cerebrovascular Disease Father      Heart Disease Father      Cancer Brother         lymphoma     Eye Disorder Son      Asthma Son      Diabetes Son      Gastrointestinal Disease Son         no control over bladder or bowels     C.A.D. No family hx of      Hypertension No family hx of      Breast Cancer No family hx of      Cancer - colorectal No family hx of      Prostate Cancer No family hx of      Review of Systems     Constitutional:  Positive for night sweats. Negative for fever, chills, weight loss, weight gain, fatigue, decreased appetite, recent stressors, height loss, post-operative complications, incisional pain, hallucinations, increased energy, hyperactivity and confused.   HENT:  Positive for tinnitus, hoarse voice, gum tenderness and dry mouth. Negative for ear pain, hearing loss, nosebleeds, trouble swallowing, mouth sores, sore throat, ear discharge, tooth pain, taste  disturbance, smell disturbance, hearing aid, bleeding gums, sinus pain, sinus congestion and neck mass.    Eyes:  Negative for double vision, pain, redness, eye pain, decreased vision, eye watering, eye bulging, eye dryness, flashing lights, spots, floaters, strabismus, tunnel vision, jaundice and eye irritation.   Respiratory:   Negative for cough, hemoptysis, sputum production, shortness of breath, wheezing, sleep disturbances due to breathing, snores loudly, respiratory pain, dyspnea on exertion, cough disturbing sleep and postural dyspnea.    Cardiovascular:  Positive for edema. Negative for chest pain, dyspnea on exertion, palpitations, orthopnea, claudication, leg swelling, fingers/toes turn blue, hypertension, hypotension, syncope, history of heart murmur, chest pain on exertion, chest pain at rest, pacemaker, few scattered varicosities, leg pain, sleep disturbances due to breathing, tachycardia, light-headedness and exercise intolerance.   Gastrointestinal:  Positive for heartburn, nausea, diarrhea and constipation. Negative for vomiting, abdominal pain, blood in stool, melena, rectal pain, bloating, bowel incontinence, jaundice, coffee ground emesis and change in stool.   Genitourinary:  Positive for bladder incontinence, dysuria, urgency, hematuria and difficulty urinating. Negative for flank pain, vaginal discharge, genital sores, dyspareunia, decreased libido, nocturia, voiding less frequently, arousal difficulty, abnormal vaginal bleeding, excessive menstruation, menstrual changes, hot flashes, vaginal dryness and postmenopausal bleeding.   Musculoskeletal:  Positive for myalgias, back pain, arthralgias, stiffness, muscle cramps and muscle weakness. Negative for joint swelling, neck pain, bone pain and fracture.   Skin:  Negative for nail changes, itching, poor wound healing, rash, hair changes, skin changes, acne, warts, poor wound healing, scarring, flaky skin, Raynaud's phenomenon, sensitivity to  sunlight and skin thickening.   Neurological:  Negative for dizziness, tingling, tremors, speech change, seizures, loss of consciousness, weakness, light-headedness, numbness, headaches, disturbances in coordination, extremity numbness, memory loss, difficulty walking and paralysis.   Endo/Heme:  Negative for anemia, swollen glands and bruises/bleeds easily.   Psychiatric/Behavioral:  Negative for depression, hallucinations, memory loss, decreased concentration, mood swings and panic attacks.    Breast:  Negative for breast discharge, breast mass, breast pain and nipple retraction.   Endocrine:  Negative for altered temperature regulation, polyphagia, polydipsia, unwanted hair growth and change in facial hair.    Allergies   Allergen Reactions     Accupril      Ace Inhibitors Unknown     Accupril     Augmentin Unknown     Blood-Group Specific Substance      Other reaction(s): *Unknown  Patient has a Non-specific antibody. Blood Product orders may be delayed.  Draw one red top and two purple top tubes for ALL Type and Screen/ Type and Crossmatch orders.      Levofloxacin Unknown, Muscle Pain (Myalgia) and Other (See Comments)     Comment: myalgias, Description:   Pt prescribed ciprofloxacin in Jan 2018 (no rxn documented)  Myalgias       Morphine Other (See Comments)     hallucinations     Nitrofurantoin Nausea and Vomiting and Unknown     Norvasc [Amlodipine Besylate]      Leg swelling       Quinapril Other (See Comments) and Unknown     Hypertension. 3.29.18 - Takes and tolerates lisinopril  Patient reports HTN with as reaction to this medication       Current Outpatient Medications   Medication     ACETAMINOPHEN EXTRA STRENGTH 500 MG tablet     ASPIRIN LOW DOSE 81 MG chewable tablet     atorvastatin (LIPITOR) 40 MG tablet     BREO ELLIPTA 100-25 MCG/INH inhaler     calcium carbonate (TUMS) 500 MG chewable tablet     DONEPEZIL HCL PO     DULoxetine (CYMBALTA) 30 MG capsule     ferrous sulfate (FEROSUL) 325 (65 Fe)  MG tablet     fluticasone (FLONASE) 50 MCG/ACT nasal spray     furosemide (LASIX) 40 MG tablet     gabapentin (NEURONTIN) 100 MG capsule     hypromellose (ARTIFICIAL TEARS) 0.5 % SOLN ophthalmic solution     isradipine (DYNACIRC) 5 MG capsule     levalbuterol (XOPENEX) 1.25 MG/3ML neb solution     Lidocaine (LIDOCARE) 4 % Patch     lisinopril (PRINIVIL/ZESTRIL) 20 MG tablet     MAGNESIUM OXIDE PO     Menthol, Topical Analgesic, (BIOFREEZE) 4 % GEL     nystatin (MYCOSTATIN) 331189 UNIT/GM external powder     OMEPRAZOLE PO     oxyCODONE (ROXICODONE) 5 MG tablet     senna-docusate (SENOKOT-S/PERICOLACE) 8.6-50 MG tablet     tiZANidine (ZANAFLEX) 2 MG tablet     vitamin C (ASCORBIC ACID) 500 MG tablet     vitamin D3 (CHOLECALCIFEROL) 1000 units (25 mcg) tablet     Blood Glucose Monitoring Suppl CORY     furosemide (LASIX) 40 MG tablet     oxyCODONE (OXYCONTIN) 10 MG 12 hr tablet     oxyCODONE (ROXICODONE) 5 MG tablet     No current facility-administered medications for this visit.      /65   Pulse 73   Wt 90.3 kg (199 lb)   BMI 35.25 kg/m   No LMP recorded. Patient has had a hysterectomy. Body mass index is 35.25 kg/m .  She is alert and oriented.  She is well groomed, comfortable in no acute distress.  Eyes have anicteric sclerae, moist conjunctivae.  Normal mood and affect.   Normocephalic, atraumatic without masses, lesions, obvious abnormalities.  Non-labored breathing.  Abdomen is soft, non-tender, non-distended, no CVAT, no organomegaly.  Her urethra has a 2 cm red fleshy tender protrusion from her urethra.  Negative ESST. Attempt at cystoscopy was aborted secondary to her significant pain    Urine dip 5/24 negative    A/P: Jazmin Clifton is a 86 year old F with urethral mass, gross hematuria    Urine cytology    MR pelvis    At this time recommend cystoscopy with possible biopsy and removal of the urethral lesion.  We discussed that the risks to the procedure include but not limited to cardiovascular  risks of anesthesia, nerve injury, bleeding, infection, injury to adjacent organs including bowel, bladder, and blood vessels, conversion to open procedure, de devendra or worsening stress incontinence or urge incontinence., need for further procedures depending on pathology.  I discussed with her and her son that she would be going home with a catheter after the surgery.  Patient expressed understanding and agreeable to proceed.    60 minutes were spent with the patient today, > 50% in counseling and coordination of care    Yesy Fong MD MPH    Urology    CC  Patient Care Team:  Junie Estrella APRN CNP as PCP - General (Nurse Practitioner - Gerontology)  Toño Shafer MD as MD (Family Practice)  Aliyah Milan as Medical Assistant (Gerontology)  Sarah Mccabe as Case Management Specialist (Primary Care - CC)  Laura George as Case Management Specialist (Primary Care - CC)  Mirian Weldon LSW as Lead Care Coordinator (Primary Care - CC)  Junie Estrella APRN CNP as Assigned PCP  Uma Webber RPH as Pharmacist (Pharmacist)  [facility] ([facility] ([facility]  Lincoln Brady MD as MD (Family Practice)  Qian Nino MD as MD (OB/Gyn)  Yesy Fong MD as MD (Urology)  QIAN NINO              Answers for HPI/ROS submitted by the patient on 5/30/2019   PHQ9 TOTAL SCORE: 0

## 2019-05-30 NOTE — NURSING NOTE
Chief Complaint   Patient presents with     Consult For     urthral mass       Weight 90.3 kg (199 lb). Body mass index is 35.25 kg/m .    Patient Active Problem List   Diagnosis     Atherosclerosis of renal artery (H)     Esophageal reflux     Cerebral aneurysm, nonruptured     Other specified cardiac dysrhythmias(427.89)     Essential hypertension, benign     Congenital cystic kidney disease     Benign neoplasm of colon     Renovascular hypertension     CHF (congestive heart failure) (H)     Restrictive lung disease     CARDIOVASCULAR SCREENING; LDL GOAL LESS THAN 100     Osteoporosis     S/P laminectomy     Hip joint replacement status     Osteoarthritis     DDD (degenerative disc disease), lumbar     Mild major depression (H)     Incontinence of urine     Chronic obstructive pulmonary disease, unspecified COPD type (H)     Generalized muscle weakness     Dizziness     Osteoarthritis of right hip, unspecified osteoarthritis type     CVA (cerebral vascular accident) (H)     Transient cerebral ischemia, unspecified type     CKD (chronic kidney disease) stage 3, GFR 30-59 ml/min (H)     Thyroid nodule     Trigger point of extremity     Trochanteric bursitis of right hip     Carotid stenosis, symptomatic w/o infarct, right     S/P carotid endarterectomy     Mild cognitive impairment     Morbid obesity (H)     Health Care Home     Shortness of breath     COPD exacerbation (H)     Weakness     Acute kidney injury (H)     Alzheimer's dementia without behavioral disturbance     Anaclitic depression     BPPV (benign paroxysmal positional vertigo)     Chronic diastolic congestive heart failure (H)     Constipation     Dyspepsia     Female stress incontinence     Hip pain     History of bradycardia     History of DVT (deep vein thrombosis)     History of herpes zoster     History of intracranial aneurysm     History of stroke     Hyperlipidemia     Impaired ambulation     Multiple closed fractures of metatarsal bone      Normocytic anemia     Postherpetic neuralgia     Rectocele     Retention of urine     Right groin mass     Spinal stenosis of lumbar region     Spondylolisthesis, lumbar region     Umbilical hernia without obstruction and without gangrene     Anemia of chronic renal failure, stage 4 (severe) (H)       Allergies   Allergen Reactions     Accupril      Ace Inhibitors Unknown     Accupril     Augmentin Unknown     Blood-Group Specific Substance      Other reaction(s): *Unknown  Patient has a Non-specific antibody. Blood Product orders may be delayed.  Draw one red top and two purple top tubes for ALL Type and Screen/ Type and Crossmatch orders.      Levofloxacin Unknown, Muscle Pain (Myalgia) and Other (See Comments)     Comment: myalgias, Description:   Pt prescribed ciprofloxacin in Jan 2018 (no rxn documented)  Myalgias       Morphine Other (See Comments)     hallucinations     Nitrofurantoin Nausea and Vomiting and Unknown     Norvasc [Amlodipine Besylate]      Leg swelling       Quinapril Other (See Comments) and Unknown     Hypertension. 3.29.18 - Takes and tolerates lisinopril  Patient reports HTN with as reaction to this medication         Current Outpatient Medications   Medication Sig Dispense Refill     ACETAMINOPHEN EXTRA STRENGTH 500 MG tablet TAKE TWO TABLETS (1000MG) BY MOUTH THREE TIMES DAILY 120 tablet 98     ASPIRIN LOW DOSE 81 MG chewable tablet CHEW AND SWALLOW ONE TABLET BY MOUTH ONCE DAILY 30 tablet 11     atorvastatin (LIPITOR) 40 MG tablet TAKE 1 TABLET BY MOUTH ONCE DAILY 30 tablet 98     BREO ELLIPTA 100-25 MCG/INH inhaler INHALE 1 PUFF BY MOUTH ONCE DAILY 1 Inhaler 11     calcium carbonate (TUMS) 500 MG chewable tablet Take 1 chew tab by mouth every hour as needed for heartburn       DONEPEZIL HCL PO Take 5 mg by mouth daily       DULoxetine (CYMBALTA) 30 MG capsule Take 2 capsules (60 mg) by mouth daily AND 1 capsule (30 mg) At Bedtime. 90 capsule 11     ferrous sulfate (FEROSUL) 325 (65 Fe)  MG tablet Take 1 tablet (325 mg) by mouth daily (with breakfast) 60 tablet 98     fluticasone (FLONASE) 50 MCG/ACT nasal spray Spray 1 spray into both nostrils 2 times daily       furosemide (LASIX) 40 MG tablet Take 1 tablet (40 mg) by mouth 2 times daily 60 tablet 11     gabapentin (NEURONTIN) 100 MG capsule Take 2 capsules (200 mg) by mouth 2 times daily 120 capsule 11     hypromellose (ARTIFICIAL TEARS) 0.5 % SOLN ophthalmic solution Place 1 drop into both eyes every 6 hours as needed for dry eyes       isradipine (DYNACIRC) 5 MG capsule Take 5 mg by mouth 2 times daily       levalbuterol (XOPENEX) 1.25 MG/3ML neb solution Take 1 ampule by nebulization every 4 hours as needed for shortness of breath / dyspnea or wheezing And every 4 hours PRN       Lidocaine (LIDOCARE) 4 % Patch Place 1 patch onto the skin daily as needed To desired area (shoulder or hip). On for 12hrs, off for 12hrs       lisinopril (PRINIVIL/ZESTRIL) 20 MG tablet TAKE 1 TABLET BY MOUTH ONCE DAILY 30 tablet 98     MAGNESIUM OXIDE PO Take 250 mg by mouth daily       Menthol, Topical Analgesic, (BIOFREEZE) 4 % GEL Externally apply topically 4 times daily as needed To left shoulder and right hip       nystatin (MYCOSTATIN) 258061 UNIT/GM external powder APPLY TOPICALLY TO ABDOMEN FOLDS, GROIN, AND BREASTS TWICE DAILY AS NEEDED 60 g 97     OMEPRAZOLE PO Take 40 mg by mouth every morning       oxyCODONE (ROXICODONE) 5 MG tablet Take 1 tablet (5 mg) by mouth 3 times daily. May also take 1 tablet (5 mg) 2 times daily as needed for severe pain. 60 tablet 0     senna-docusate (SENOKOT-S/PERICOLACE) 8.6-50 MG tablet Take 1 tablet by mouth 2 times daily as needed        tiZANidine (ZANAFLEX) 2 MG tablet TAKE 1 TABLET BY MOUTH THREE TIMES DAILY AS NEEDED 90 tablet 97     vitamin C (ASCORBIC ACID) 500 MG tablet TAKE 1 TAB BY MOUTH ONCE DAILY FOR SUPPLEMENT  0     vitamin D3 (CHOLECALCIFEROL) 1000 units (25 mcg) tablet Take 1 tablet (1,000 Units) by mouth  daily 60 tablet 11     Blood Glucose Monitoring Suppl CORY 2 times daily Call nurse for further directions if blood glucose is less than 80 or greater than 250       furosemide (LASIX) 40 MG tablet Take 40 mg by mouth daily       oxyCODONE (OXYCONTIN) 10 MG 12 hr tablet Take 1 tablet (10 mg) by mouth every 12 hours maximum 2 tablet(s) per day (Patient not taking: Reported on 2019) 60 tablet 0     oxyCODONE (ROXICODONE) 5 MG tablet Take 1 tablet (5 mg) by mouth daily as needed for severe pain (Patient not taking: Reported on 2019) 30 tablet 0       Social History     Tobacco Use     Smoking status: Former Smoker     Last attempt to quit: 1992     Years since quittin.4     Smokeless tobacco: Never Used   Substance Use Topics     Alcohol use: Yes     Comment: wine occas.     Drug use: No       Anjelica Zambrano LPN  2019  9:06 AM

## 2019-05-31 ASSESSMENT — PATIENT HEALTH QUESTIONNAIRE - PHQ9: SUM OF ALL RESPONSES TO PHQ QUESTIONS 1-9: 0

## 2019-06-03 NOTE — PROGRESS NOTES
PRE-OP EVALUATION:    [facility] [place] Medical Record Number:  [id number]  Place of Service where encounter took place:  [facility] (SNF) [[id number]]  Today's date: 2019    [facility]  [address]  [facility] [address]  [phone]  Dept: [phone]    PRE-OP EVALUATION:  Today's date: 2019    Jazmin Clifton (: 1932) presents for pre-operative evaluation assessment as requested by Dr. Fong.  She requires evaluation and anesthesia risk assessment prior to undergoing surgery/procedure for treatment of urethral mass .    Proposed Surgery/ Procedure: cystoscopy with possible biopsy, removal or urethral lesion  Date of Surgery/ Procedure: 6/10/2019  Time of Surgery/ Procedure: 11:50 AM  Hospital/Surgical Facility: [facility]  Fax number for surgical facility: [phone]  Primary Physician: Junie Estrella  Type of Anesthesia Anticipated: General and to be determined    Patient has a Health Care Directive or Living Will:  YES POLST    1. YES - DO YOU HAVE A HISTORY OF HEART ATTACK, STROKE, STENT, BYPASS OR SURGERY ON AN ARTERY IN THE HEAD, NECK, HEART OR LEG? H/o CVA  2. NO - Do you ever have any pain or discomfort in your chest?  3. YES - DO YOU HAVE A HISTORY OF HEART FAILURE HFpEF,first diagnosed in   4. YES - ARE YOUR TROUBLED BY SHORTNESS OF BREATH WHEN WALKING ON THE LEVEL, UP A SLIGHT HILL OR AT NIGHT? Has h/o COPD, primarily WC bound  5. NO - Do you currently have a cold, bronchitis or other respiratory infection?  6. NO - Do you have a cough, shortness of breath or wheezing?  7. NO - Do you sometimes get pains in the calves of your legs when you walk?  8. NO - Do you or anyone in your family have previous history of blood clots?  9. NO - Do you or does anyone in your family have a serious bleeding problem such as prolonged bleeding following surgeries or cuts?  10. YES - HAVE YOU EVER HAD PROBLEMS WITH ANEMIA OR BEEN TOLD  TO TAKE IRON PILLS? Currently on iron pills  11. YES - HAVE YOU HAD ANY ABNORMAL BLOOD LOSS SUCH AS BLACK, TARRY OR BLOODY STOOLS, OR ABNORMAL VAGINAL BLEEDING? Blood in urine from urethral mass  12. NO - Have you ever had a blood transfusion?  13. YES - HAVE YOU OR ANY OF YOUR RELATIVES EVER HAD PROBLEMS WITH ANESTHESIA? Some post-op confusion  14. NO - Do you have sleep apnea, excessive snoring or daytime drowsiness?  15. NO - Do you have any prosthetic heart valves?  16. YES - DO YOU HAVE PROSTHETIC JOINTS? Left hip  17. NO - Is there any chance that you may be pregnant?      HPI:     HPI related to upcoming procedure: Noted small amounts of blood and in urine and incontinence pad. 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 Was referred for Dr. Fong at the [facility] on 5/23, who recommended cytoscopy for possible biopsy and removal of lesion.       ANEMIA - Patient has a recent history of moderate-severe anemia, which has not been symptomatic. Work up to date has revealed Normal hgb. Treatment has been iron supplement.                                                                                                                                            .  CHF - Patient has a longstanding history of moderate-severe CHF. Exacerbating conditions include hypertension and COPD. Currently the patient's condition is same. Current treatment regimen includes ACE inhibitor, calcium channel blocker, ASA and diuretic. The patient denies chest pain, edema, orthopnea, SOB or recent weight gain. Last Echocardiogram 2/2019, showed EF of 55-60%, mild concentric LVH, grade II diastolic dysfunction, EKG 5/10/19 - NSR                                                                                                                                                                        .  COPD - Patient has a longstanding history of moderate-severe COPD . Patient has been doing well overall noting  LACEY and is primarily WC bound, no SOB at rest and continues on medication regimen consisting of Breo Ellipta inhaler daily levalbuterol nebs PRN,  without adverse reactions or side effects.                                                                                                         .  DEPRESSION - Patient has a long history of Depression of moderate severity requiring medication for control with recent symptoms being stable..Current symptoms of depression include anxiety.                                                                                                                                                                                    .  HYPERLIPIDEMIA - Patient has a long history of significant Hyperlipidemia requiring medication for treatment with recent good control. Patient reports no problems or side effects with the medication.                                                                                                                                                       .  HYPERTENSION - Patient has longstanding history of HTN , currently denies any symptoms referable to elevated blood pressure. Specifically denies chest pain, palpitations, dyspnea, orthopnea, PND or peripheral edema. Blood pressure readings have been in normal range. Current medication regimen is as listed below. Patient denies any side effects of medication.                                                                                                                                                                                          .  RENAL INSUFFICIENCY - Patient has a longstanding history of moderate-severe chronic renal insufficiency. Last Cr 1.18 (new labs pending)                                                                                                                                                                               .    MEDICAL HISTORY:     Patient Active Problem List    Diagnosis  "Date Noted     Mass of urethra 2019     Priority: Medium     Gross hematuria 2019     Priority: Medium     Anemia of chronic renal failure, stage 4 (severe) (H) 2019     Priority: Medium     COPD exacerbation (H) 05/10/2019     Priority: Medium     Weakness 05/10/2019     Priority: Medium     Shortness of breath 2019     Priority: Medium     Health Care Home 2019     Priority: Medium     [facility] Care Coordinator  Mirian Weldon MA, LSW  [phone]    [place] [employer] CARE PLAN SUMMARY    Member Name:  Jazmin Clifton    Address:  [facility]  [address]  [facility] [place] [address] Phone: [phone] (home)    :  1932 Date of Assessment: 19  Change in condition visit    Health Plan:  [employer]  Health Plan Number: [id number] Medical Assistance Number: [id number]  Financial Worker:  [facility]   Case #:     [facility] Care Coordinator:  Mirian Weldon MA, LSW CC Phone:  [phone]  CC Fax:  [phone]   FVP Enrollment Date: 19 Case Mix:  D  Rate Cell:  E  Waiver Type: EW     Primary Emergency Contact:  Chi Clifton  Address: [address] [place] [address]  Mobile Phone: [phone]  Relation: Son    Secondary Emergency Contact: Lucius Clifton  Address: [address] [address]   Home Phone: [phone]  Work Phone: [phone]  Relation: Son Language:  English  :  No   Health Care Agent/POA:   Advanced Directives/Living Will:     Primary Care Clinic/Phone/Fax:  [facility]/(p) [phone], (f) [phone] Primary Dx:  COPD J44.9  Secondary Dx: DDD M51.36  Cognitive Impairment G31.84   Primary Physician:  Junie Estrella   Height:  5' 3\"  Weight:  203 lbs   Specialty Physician:    Audiologist:     Eye Care Provider:   Dental Care Provider:  [facility]   [facility] [phone]   Other:        [facility] SUMMARY  Equipment owned/DME " history:  Member son purchased electric wheelchair for member;    Member has scooter, lift chair, 3 wheeled walker   SERVICE TYPE/PROVIDER NAME/PHONE AUTH DATE FREQUENCY Units OR $ Amt DESCRIPTION   Medical Transportation: [facility] [phone]  Fax:  4-1-19 thru 3-30-20 review annually/PRN  as needed     Assisted Living: [facility] (Assisted Living) [phone] 24 hr [facility]  Fax:  4-1-19 thru 3-30-20 review annually/PRN daily See RS/AL Tool      Supplies: [employer] [phone]  Fax:  4-1-19 thru 3-30-20 review annually/PRN   monthly 1 pull up per day   1 liner per day  XL pull ups     Regular liners      Abraham ESPARZA    [phone]   5-1-19 thru 3-30-20  as needed                      Acute kidney injury (H) 01/11/2019     Priority: Medium     Hip pain 10/29/2018     Priority: Medium     Impaired ambulation 10/29/2018     Priority: Medium     Morbid obesity (H) 06/19/2018     Priority: Medium     Multiple closed fractures of metatarsal bone 05/28/2018     Priority: Medium     Right groin mass 05/03/2018     Priority: Medium     Chronic diastolic congestive heart failure (H) 02/02/2018     Priority: Medium     History of stroke 02/02/2018     Priority: Medium     Mild cognitive impairment 09/22/2017     Priority: Medium     S/P carotid endarterectomy 09/06/2017     Priority: Medium     Underwent for symptomatic right carotid artery stenosis        Carotid stenosis, symptomatic w/o infarct, right 08/31/2017     Priority: Medium     Thyroid nodule 08/23/2017     Priority: Medium     Trigger point of extremity 08/23/2017     Priority: Medium     Trochanteric bursitis of right hip 08/23/2017     Priority: Medium     CKD (chronic kidney disease) stage 3, GFR 30-59 ml/min (H) 08/16/2017     Priority: Medium     Transient cerebral ischemia, unspecified type 08/01/2017     Priority: Medium     CVA (cerebral vascular accident) (H) 07/26/2017     Priority: Medium      Chronic obstructive pulmonary disease, unspecified COPD type (H) 07/25/2017     Priority: Medium     Generalized muscle weakness 07/25/2017     Priority: Medium     Dizziness 07/25/2017     Priority: Medium     Osteoarthritis of right hip, unspecified osteoarthritis type 07/25/2017     Priority: Medium     Alzheimer's dementia without behavioral disturbance 05/12/2017     Priority: Medium     Retention of urine 04/15/2017     Priority: Medium     History of intracranial aneurysm 04/14/2017     Priority: Medium     Postherpetic neuralgia 11/14/2016     Priority: Medium     Umbilical hernia without obstruction and without gangrene 06/03/2016     Priority: Medium     Spinal stenosis of lumbar region 01/11/2016     Priority: Medium     Spondylolisthesis, lumbar region 01/11/2016     Priority: Medium     BPPV (benign paroxysmal positional vertigo) 11/27/2014     Priority: Medium     Dyspepsia 11/27/2014     Priority: Medium     Female stress incontinence 11/27/2014     Priority: Medium     History of bradycardia 11/27/2014     Priority: Medium     History of DVT (deep vein thrombosis) 11/27/2014     Priority: Medium     History of herpes zoster 11/27/2014     Priority: Medium     Constipation 10/20/2012     Priority: Medium     Rectocele 08/31/2012     Priority: Medium     Hip joint replacement status 04/18/2012     Priority: Medium     Osteoarthritis 04/18/2012     Priority: Medium     DDD (degenerative disc disease), lumbar 04/18/2012     Priority: Medium     Mild major depression (H) 04/18/2012     Priority: Medium     Incontinence of urine 04/18/2012     Priority: Medium     Hyperlipidemia 12/07/2011     Priority: Medium     Osteoporosis 10/25/2011     Priority: Medium     S/P laminectomy 10/25/2011     Priority: Medium     CARDIOVASCULAR SCREENING; LDL GOAL LESS THAN 100 10/31/2010     Priority: Medium     Normocytic anemia 02/17/2010     Priority: Medium     CHF (congestive heart failure) (H) 09/17/2008      Priority: Medium     Diastolic disfunction - ECHP 2008       Restrictive lung disease 09/17/2008     Priority: Medium     PFT 4/2008       Renovascular hypertension 11/09/2006     Priority: Medium     Problem list name updated by automated process. Provider to review       Benign neoplasm of colon 10/04/2006     Priority: Medium     Angiodysplasia - due for repeat 10 years.  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 NO edema. Continue current Therapy.HYPERTENSION: Dynacirc CR 10mg daily initiated today. Will continue adjusting blood pressure medicatins as needed until readings at target.VITAMIN D  DEFICIENCY - Completed therapy, discontinue ergocalciferol.ANEMIA, EPO DEFICIENCY - Hgb 10.2. Not on EPO. Continue observation for now. Recnet Hgb drop due to lumbar laminectomy.  Problem list name updated by automated process. Provider to review       Essential hypertension, benign 05/01/2006     Priority: Medium     Atherosclerosis of renal artery (H)      Priority: Medium     Left renal artery stenosis       Esophageal reflux      Priority: Medium     Gastroesophageal Reflux Disease       Cerebral aneurysm, nonruptured      Priority: Medium     Cerebral Aneurysm - unchanged on rcent MRI.  Seen by neurosurg.  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  Pt was hospitilzed 2x for the pain at [facility] --pt hopsitized for 3 days.  Rcords not ab=vailable.  She was told either \" twisted intestine or blockage of bowel.  Pt feels it is the hiatal hernia.  Pt hospitilized again a second time  A month ago.  Ct scans x 2 showed \" nothing.\"  Pt had a barium and xrays.  Pt sa     Abdominal pain, generalized 3/15/2006    1 month of abdominal pain that bryn radiates into her back and chest.  Pt was hospitilzed 2x for the pain at [facility] --pt hopsitized for 3 days.  Rcords not ab=vailable.  She was told either \" twisted intestine or blockage of bowel.  Pt feels it is the hiatal hernia.  Pt hospitilized again a second time  A month ago.  Ct scans x 2 showed \" nothing.\"  Pt had a barium and xrays.  Pt sa     Atherosclerosis of renal artery (H)     Left renal artery stenosis     BENIGN HYPERTENSION 5/1/2006 5/1/2006   Increase catapres to 0.3 mg and decrease clonidine to 0.1 mg daily.  Recheck bp in 1 month.      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     Dyspepsia     Generalized osteoarthrosis, unspecified site     DJD-chronic back pain     Herpes zoster dermatitis of eyelid      Insomnia, unspecified      Lumbago     Chronic back pain     Other chest pain     Atypical Chest Pain     Other specified cardiac dysrhythmias(427.89)     Bradycardia, improved on lower dose beta blockers      Rectocele     Grade 3     Unspecified disorder of kidney and ureter     Renal insufficiency     Unspecified essential hypertension      Past Surgical History:   Procedure Laterality Date     CHOLECYSTECTOMY, " LAPOROSCOPIC  1997    Cholecystectomy, Laparoscopic     ENDARTERECTOMY CAROTID Right 8/31/2017    Procedure: ENDARTERECTOMY CAROTID;  RIGHT CAROTID ENDARTERECTOMY WITH EEG;  Surgeon: Hilario Parry MD;  Location: [facility] OR     HYSTERECTOMY, RAYMOND  1982    oophorectomy,RAYMOND     SURGICAL HISTORY OF -       Laminectomy x 3     SURGICAL HISTORY OF -       MCA Aneurysm repair     SURGICAL HISTORY OF -   1996    Bladder suspension (Bladder Repair x2)     SURGICAL HISTORY OF -       Bilateral Hand Surgeries for Arthritis x2     SURGICAL HISTORY OF -       Repair of a Cerebral Aneurysm     Current Outpatient Medications   Medication Sig Dispense Refill     ACETAMINOPHEN EXTRA STRENGTH 500 MG tablet TAKE TWO TABLETS (1000MG) BY MOUTH THREE TIMES DAILY 120 tablet 98     ASPIRIN LOW DOSE 81 MG chewable tablet CHEW AND SWALLOW ONE TABLET BY MOUTH ONCE DAILY 30 tablet 11     atorvastatin (LIPITOR) 40 MG tablet TAKE 1 TABLET BY MOUTH ONCE DAILY 30 tablet 98     Blood Glucose Monitoring Suppl CORY 2 times daily Call nurse for further directions if blood glucose is less than 80 or greater than 250       BREO ELLIPTA 100-25 MCG/INH inhaler INHALE 1 PUFF BY MOUTH ONCE DAILY 1 Inhaler 11     calcium carbonate (TUMS) 500 MG chewable tablet Take 1 chew tab by mouth every hour as needed for heartburn       DONEPEZIL HCL PO Take 5 mg by mouth daily       DULoxetine (CYMBALTA) 30 MG capsule Take 2 capsules (60 mg) by mouth daily AND 1 capsule (30 mg) At Bedtime. 90 capsule 11     ferrous sulfate (FEROSUL) 325 (65 Fe) MG tablet Take 1 tablet (325 mg) by mouth daily (with breakfast) 60 tablet 98     fluticasone (FLONASE) 50 MCG/ACT nasal spray Spray 1 spray into both nostrils 2 times daily       furosemide (LASIX) 40 MG tablet Take 40 mg by mouth daily       furosemide (LASIX) 40 MG tablet Take 1 tablet (40 mg) by mouth 2 times daily 60 tablet 11     gabapentin (NEURONTIN) 100 MG capsule Take 2 capsules (200 mg) by mouth 2 times daily 120  capsule 11     hypromellose (ARTIFICIAL TEARS) 0.5 % SOLN ophthalmic solution Place 1 drop into both eyes every 6 hours as needed for dry eyes       isradipine (DYNACIRC) 5 MG capsule Take 5 mg by mouth 2 times daily       levalbuterol (XOPENEX) 1.25 MG/3ML neb solution Take 1 ampule by nebulization every 4 hours as needed for shortness of breath / dyspnea or wheezing And every 4 hours PRN       Lidocaine (LIDOCARE) 4 % Patch Place 1 patch onto the skin daily as needed To desired area (shoulder or hip). On for 12hrs, off for 12hrs       lisinopril (PRINIVIL/ZESTRIL) 20 MG tablet TAKE 1 TABLET BY MOUTH ONCE DAILY 30 tablet 98     MAGNESIUM OXIDE PO Take 250 mg by mouth daily       Menthol, Topical Analgesic, (BIOFREEZE) 4 % GEL Externally apply topically 4 times daily as needed To left shoulder and right hip       nystatin (MYCOSTATIN) 676898 UNIT/GM external powder APPLY TOPICALLY TO ABDOMEN FOLDS, GROIN, AND BREASTS TWICE DAILY AS NEEDED 60 g 97     OMEPRAZOLE PO Take 40 mg by mouth every morning       oxyCODONE (ROXICODONE) 5 MG tablet Take 1 tablet (5 mg) by mouth 3 times daily. May also take 1 tablet (5 mg) 2 times daily as needed for severe pain. 60 tablet 0     senna-docusate (SENOKOT-S/PERICOLACE) 8.6-50 MG tablet Take 1 tablet by mouth 2 times daily as needed        tiZANidine (ZANAFLEX) 2 MG tablet TAKE 1 TABLET BY MOUTH THREE TIMES DAILY AS NEEDED 90 tablet 97     vitamin C (ASCORBIC ACID) 500 MG tablet TAKE 1 TAB BY MOUTH ONCE DAILY FOR SUPPLEMENT  0     vitamin D3 (CHOLECALCIFEROL) 1000 units (25 mcg) tablet Take 1 tablet (1,000 Units) by mouth daily 60 tablet 11     oxyCODONE (OXYCONTIN) 10 MG 12 hr tablet Take 1 tablet (10 mg) by mouth every 12 hours maximum 2 tablet(s) per day (Patient not taking: Reported on 5/30/2019) 60 tablet 0     oxyCODONE (ROXICODONE) 5 MG tablet Take 1 tablet (5 mg) by mouth daily as needed for severe pain (Patient not taking: Reported on 5/30/2019) 30 tablet 0     OTC  products: None, except as noted above and Aspirin    Allergies   Allergen Reactions     Accupril      Ace Inhibitors Unknown     Accupril     Augmentin Unknown     Blood-Group Specific Substance      Other reaction(s): *Unknown  Patient has a Non-specific antibody. Blood Product orders may be delayed.  Draw one red top and two purple top tubes for ALL Type and Screen/ Type and Crossmatch orders.      Levofloxacin Unknown, Muscle Pain (Myalgia) and Other (See Comments)     Comment: myalgias, Description:   Pt prescribed ciprofloxacin in 2018 (no rxn documented)  Myalgias       Morphine Other (See Comments)     hallucinations     Nitrofurantoin Nausea and Vomiting and Unknown     Norvasc [Amlodipine Besylate]      Leg swelling       Quinapril Other (See Comments) and Unknown     Hypertension. 3.29.18 - Takes and tolerates lisinopril  Patient reports HTN with as reaction to this medication        Latex Allergy: NO    Social History     Tobacco Use     Smoking status: Former Smoker     Last attempt to quit: 1992     Years since quittin.4     Smokeless tobacco: Never Used   Substance Use Topics     Alcohol use: Yes     Comment: wine occas.     History   Drug Use No       REVIEW OF SYSTEMS:   10 point ROS of systems including Constitutional, Eyes, Respiratory, Cardiovascular, Gastroenterology, Genitourinary, Integumentary, Musculoskeletal, Psychiatric were all negative except for pertinent positives noted in my HPI.    EXAM:   /60   Pulse 84   Temp 98  F (36.7  C)   Resp 18   Wt 88.5 kg (195 lb)   SpO2 93%   BMI 34.54 kg/m    GENERAL APPEARANCE:  Alert, in no distress, oriented, cooperative  ENT:  Mouth and posterior oropharynx normal, moist mucous membranes, poor dentition, missing multiple teeth in upper and lower jaw  EYES:  EOM, conjunctivae, lids, pupils and irises normal  RESP:  respiratory effort and palpation of chest normal, lungs clear to auscultation , no respiratory distress, no cough  noted, on RA, LACEY  CV:  Palpation and auscultation of heart done , regular rate and rhythm, no murmur, rub, or gallop, +2 pedal pulses, peripheral edema 2+ in BLE  ABDOMEN:  no guarding or rebound, bowel sounds normal, soft, non-tender  M/S:   decreased ROM to right hip, tenderness, to right hip, primarily non-ambulatory  SKIN:  Inspection of skin and subcutaneous tissue baseline. Right groin seroma soft, tender to touch, size decrease from previous exam  NEURO:   Cranial nerves 2-12 are normal tested and grossly at patient's baseline, Examination of sensation by touch normal  PSYCH:  oriented X 3, normal insight, judgement and memory, affect and mood normal    DIAGNOSTICS:     EKG: appears normal, NSR, normal axis, normal intervals, no acute ST/T changes c/w ischemia, no LVH by voltage criteria, unchanged from previous tracings  Labs Drawn and in Process:   Unresulted Labs Ordered in the Past 30 Days of this Admission     No orders found from 4/5/2019 to 6/5/2019.        Urine culture ordered to be collected at [facility]    Recent Labs   Lab Test 05/11/19  1509 05/10/19  1104  02/19/19  0546  08/31/17  1113   HGB 12.2 11.9   < > 10.2*   < >  --     167   < > 228   < > 172   INR  --  1.02  --  1.09  --   --     143   < > 142   < > 141   POTASSIUM 4.2 3.8   < > 3.9   < > 5.3   CR 1.18* 1.43*   < > 1.53*   < > 1.42*   A1C  --   --   --   --   --  6.2*    < > = values in this interval not displayed.        IMPRESSION:   Reason for surgery/procedure: removal of urethral mass  Diagnosis/reason for consult: Urethral mass, possible malignant    The proposed surgical procedure is considered INTERMEDIATE risk.    REVISED CARDIAC RISK INDEX  The patient has the following serious cardiovascular risks for perioperative complications such as (MI, PE, VFib and 3  AV Block):  Congestive Heart Failure (pulmonary edema, PND, s3 holli, CXR with pulmonary congestion, basilar rales)  Cerebrovascular Disease (TIA or  CVA)  INTERPRETATION: 2 risks: Class III (moderate risk - 6.6% complication rate)    The patient has the following additional risks for perioperative complications:  Morbid obesity    No diagnosis found.    ORDERS:     --Consult hospital rounder / IM to assist post-op medical management    Cardiovascular Risk  Beta blockers contraindicated due to h/o of bradycardia on beta blocker      Pulmonary Risk  Incentive spirometry post op  Respiratory Therapy (Respiratory Care IP Consult)  post op  NG tube decompression if abdominal distension or significant vomiting   Maximize COPD treatment continue current inhalers, PRN neb       Anemia  Anemia and does not require treatment prior to surgery.  Monitor Hemoglobin postoperatively.      --Patient is to take all scheduled medications on the day of surgery EXCEPT for modifications listed below.    Anticoagulant or Antiplatelet Medication Use  ASPIRIN: Discontinue ASA 7-10 days prior to procedure to reduce bleeding risk.  It should be resumed post-operatively.        ACE Inhibitor or Angiotensin Receptor Blocker (ARB) Use  Ace inhibitor or Angiotensin Receptor Blocker (ARB) and will continue this medication due to the higher risk of uncontrolled perioperative hypertension (e.g. neurosurgical procedure)      APPROVAL GIVEN to proceed with proposed procedure, without further diagnostic evaluation    Orders:  1. Send UC  2. BMP, CBC on next lab day - Dx: urethral mass, anemia  3. Please attach H&P from [facility] list tp pre-op papers(NP will fax to [facility] on completion)  4. Hold ASA starting 6/5/2019  5. On 6/10/19 hold Donepezil , ferrous sulfate, mag oxide  6. Discontinue vitamin c   7. Continue oxycodone 5mg TID and 5mg BID PRN until after cytoscopy, then if Oxycontin  available will attempt to start after return to [facility].        Signed Electronically by: MARY Gómez CNP    Copy of this evaluation report is provided to requesting physician.    [place] Preop  Guidelines    Revised Cardiac Risk Index

## 2019-06-04 ENCOUNTER — NURSING HOME VISIT (OUTPATIENT)
Dept: GERIATRICS | Facility: CLINIC | Age: 84
End: 2019-06-04
Payer: COMMERCIAL

## 2019-06-04 ENCOUNTER — PATIENT OUTREACH (OUTPATIENT)
Dept: GERIATRIC MEDICINE | Facility: CLINIC | Age: 84
End: 2019-06-04

## 2019-06-04 VITALS
SYSTOLIC BLOOD PRESSURE: 136 MMHG | BODY MASS INDEX: 34.54 KG/M2 | WEIGHT: 195 LBS | TEMPERATURE: 98 F | HEART RATE: 84 BPM | OXYGEN SATURATION: 93 % | RESPIRATION RATE: 18 BRPM | DIASTOLIC BLOOD PRESSURE: 60 MMHG

## 2019-06-04 DIAGNOSIS — G89.4 CHRONIC PAIN SYNDROME: ICD-10-CM

## 2019-06-04 DIAGNOSIS — N36.8 MASS OF URETHRA: ICD-10-CM

## 2019-06-04 DIAGNOSIS — Z01.818 PRE-OP EXAM: Primary | ICD-10-CM

## 2019-06-04 DIAGNOSIS — M51.369 DDD (DEGENERATIVE DISC DISEASE), LUMBAR: ICD-10-CM

## 2019-06-04 PROCEDURE — 99309 SBSQ NF CARE MODERATE MDM 30: CPT | Performed by: NURSE PRACTITIONER

## 2019-06-04 PROCEDURE — 99207 ZZC CDG-CODE CATEGORY CHANGED: CPT | Performed by: NURSE PRACTITIONER

## 2019-06-04 RX ORDER — OXYCODONE HYDROCHLORIDE 5 MG/1
TABLET ORAL
Qty: 60 TABLET | Refills: 0 | Status: SHIPPED | OUTPATIENT
Start: 2019-06-04 | End: 2019-06-05

## 2019-06-04 RX ORDER — OXYCODONE HYDROCHLORIDE 5 MG/1
TABLET ORAL
Qty: 60 TABLET | Refills: 0 | Status: SHIPPED | OUTPATIENT
Start: 2019-06-04 | End: 2019-06-04

## 2019-06-04 NOTE — PROGRESS NOTES
CC received call from SEVERO Menjivar at [facility] stating that member is needing more pull ups at this time.  CC just increased these so will increase them again to the most that member can have.       RN also shared that member has had staff call to make appointments for her and set up rides and then has canceled them.  CC offered to do this for member if she were to call CC. CC shared member had been calling financial worker for products and CC has encouraged her to call CC.  CC encouraged RN to have member call and CC will help.      RN then shared that member is scheduled for surgery at [place] on 6-10 for a tumor they are unsure if this is CA or not.  Pre op has been done by NP and they will keep CC informed as needed.     CC will asked [facility] to increase products and CC will f/u as needed.   Mirian Weldon MA [facility] Care Coordinator   [phone]

## 2019-06-04 NOTE — LETTER
2019        RE: Jazmin Clifton  [place]  [address]  [facility] [place] [address]        PRE-OP EVALUATION:    [place] Medical Record Number:  [id number]  Place of Service where encounter took place:  [facility] (SNF) [[id number]]  Today's date: 2019    [facility]  [address]  [facility] [id number] [phone]  Dept: [phone]    PRE-OP EVALUATION:  Today's date: 2019    Jazminnancy Clifton (: 1932) presents for pre-operative evaluation assessment as requested by Dr. Fong.  She requires evaluation and anesthesia risk assessment prior to undergoing surgery/procedure for treatment of urethral mass .    Proposed Surgery/ Procedure: cystoscopy with possible biopsy, removal or urethral lesion  Date of Surgery/ Procedure: 6/10/2019  Time of Surgery/ Procedure: 11:50 AM  Hospital/Surgical Facility: [facility]  Fax number for surgical facility: [phone]  Primary Physician: Junie Estrella  Type of Anesthesia Anticipated: General and to be determined    Patient has a Health Care Directive or Living Will:  YES POLST    1. YES - DO YOU HAVE A HISTORY OF HEART ATTACK, STROKE, STENT, BYPASS OR SURGERY ON AN ARTERY IN THE HEAD, NECK, HEART OR LEG? H/o CVA  2. NO - Do you ever have any pain or discomfort in your chest?  3. YES - DO YOU HAVE A HISTORY OF HEART FAILURE HFpEF,first diagnosed in   4. YES - ARE YOUR TROUBLED BY SHORTNESS OF BREATH WHEN WALKING ON THE LEVEL, UP A SLIGHT HILL OR AT NIGHT? Has h/o COPD, primarily WC bound  5. NO - Do you currently have a cold, bronchitis or other respiratory infection?  6. NO - Do you have a cough, shortness of breath or wheezing?  7. NO - Do you sometimes get pains in the calves of your legs when you walk?  8. NO - Do you or anyone in your family have previous history of blood clots?  9. NO - Do you or does anyone in your family have a serious bleeding problem such as  prolonged bleeding following surgeries or cuts?  10. YES - HAVE YOU EVER HAD PROBLEMS WITH ANEMIA OR BEEN TOLD TO TAKE IRON PILLS? Currently on iron pills  11. YES - HAVE YOU HAD ANY ABNORMAL BLOOD LOSS SUCH AS BLACK, TARRY OR BLOODY STOOLS, OR ABNORMAL VAGINAL BLEEDING? Blood in urine from urethral mass  12. NO - Have you ever had a blood transfusion?  13. YES - HAVE YOU OR ANY OF YOUR RELATIVES EVER HAD PROBLEMS WITH ANESTHESIA? Some post-op confusion  14. NO - Do you have sleep apnea, excessive snoring or daytime drowsiness?  15. NO - Do you have any prosthetic heart valves?  16. YES - DO YOU HAVE PROSTHETIC JOINTS? Left hip  17. NO - Is there any chance that you may be pregnant?      HPI:     HPI related to upcoming procedure: Noted small amounts of blood and in urine and incontinence pad. 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 Was referred for Dr. Fong at the [facility] on 5/23, who recommended cytoscopy for possible biopsy and removal of lesion.       ANEMIA - Patient has a recent history of moderate-severe anemia, which has not been symptomatic. Work up to date has revealed Normal hgb. Treatment has been iron supplement.                                                                                                                                            .  CHF - Patient has a longstanding history of moderate-severe CHF. Exacerbating conditions include hypertension and COPD. Currently the patient's condition is same. Current treatment regimen includes ACE inhibitor, calcium channel blocker, ASA and diuretic. The patient denies chest pain, edema, orthopnea, SOB or recent weight gain. Last Echocardiogram 2/2019, showed EF of 55-60%, mild concentric LVH, grade II diastolic dysfunction, EKG 5/10/19 - NSR                                                                                                                                                                         .  COPD - Patient has a longstanding history of moderate-severe COPD . Patient has been doing well overall noting LACEY and is primarily WC bound, no SOB at rest and continues on medication regimen consisting of Breo Ellipta inhaler daily levalbuterol nebs PRN,  without adverse reactions or side effects.                                                                                                         .  DEPRESSION - Patient has a long history of Depression of moderate severity requiring medication for control with recent symptoms being stable..Current symptoms of depression include anxiety.                                                                                                                                                                                    .  HYPERLIPIDEMIA - Patient has a long history of significant Hyperlipidemia requiring medication for treatment with recent good control. Patient reports no problems or side effects with the medication.                                                                                                                                                       .  HYPERTENSION - Patient has longstanding history of HTN , currently denies any symptoms referable to elevated blood pressure. Specifically denies chest pain, palpitations, dyspnea, orthopnea, PND or peripheral edema. Blood pressure readings have been in normal range. Current medication regimen is as listed below. Patient denies any side effects of medication.                                                                                                                                                                                          .  RENAL INSUFFICIENCY - Patient has a longstanding history of moderate-severe chronic renal insufficiency. Last Cr 1.18 (new labs pending)                                                                                                                                 "                                               .    MEDICAL HISTORY:     Patient Active Problem List    Diagnosis Date Noted     Mass of urethra 2019     Priority: Medium     Gross hematuria 2019     Priority: Medium     Anemia of chronic renal failure, stage 4 (severe) (H) 2019     Priority: Medium     COPD exacerbation (H) 05/10/2019     Priority: Medium     Weakness 05/10/2019     Priority: Medium     Shortness of breath 2019     Priority: Medium     Health Care Home 2019     Priority: Medium     [facility] Care Coordinator  Mirian Weldon MA, LSW  [phone]    [employer] CARE PLAN SUMMARY    Member Name:  Jazmin Clifton    Address:  [facility]  [address] [address] [address] Phone: [phone] (home)    :  1932 Date of Assessment: 19  Change in condition visit    Health Plan:  [employer]  Health Plan Number: [id number] Medical Assistance Number: [id number]  Financial Worker:  [facility]   Case #:     [facility] Care Coordinator:  Mirian Weldon MA, LSW CC Phone:  [phone]  CC Fax:  [phone]   FVP Enrollment Date: 19 Case Mix:  D  Rate Cell:  E  Waiver Type: EW     Primary Emergency Contact:  Chi Clifton  Address: [address] [place] [address]  Mobile Phone: [phone]  Relation: Son    Secondary Emergency Contact: Lucius Clifton  Address: [address] [address] [address]   Home Phone: [phone]  Work Phone: [phone]  Relation: Son Language:  English  :  No   Health Care Agent/POA:   Advanced Directives/Living Will:     Primary Care Clinic/Phone/Fax:  [facility]/(p) [phone], (f) [phone] Primary Dx:  COPD J44.9  Secondary Dx: DDD M51.36  Cognitive Impairment G31.84   Primary Physician:  Junie Estrella   Height:  5' 3\"  Weight:  203 lbs   Specialty Physician:    Audiologist:     Eye Care Provider:   Dental Care Provider:  [facility]"   [facility]: [facility] [phone]   Other:        [employer] CURRENT SERVICES SUMMARY  Equipment owned/DME history:  Member son purchased electric wheelchair for member;    Member has scooter, lift chair, 3 wheeled walker   SERVICE TYPE/PROVIDER NAME/PHONE AUTH DATE FREQUENCY Units OR $ Amt DESCRIPTION   Medical Transportation: [employer] [facility] [phone]  Fax:  4-1-19 thru 3-30-20 review annually/PRN  as needed     Assisted Living: [facility] ([facility]isted [facility]) [phone] 24 hr [facility]  Fax:  4-1-19 thru 3-30-20 review annually/PRN daily See RS/AL Tool      Supplies: [facility] [phone]  Fax:  4-1-19 thru 3-30-20 review annually/PRN   monthly 1 pull up per day   1 liner per day  XL pull ups     Regular liners      Abraham ESPARZA    [phone]   5-1-19 thru 3-30-20  as needed                      Acute kidney injury (H) 01/11/2019     Priority: Medium     Hip pain 10/29/2018     Priority: Medium     Impaired ambulation 10/29/2018     Priority: Medium     Morbid obesity (H) 06/19/2018     Priority: Medium     Multiple closed fractures of metatarsal bone 05/28/2018     Priority: Medium     Right groin mass 05/03/2018     Priority: Medium     Chronic diastolic congestive heart failure (H) 02/02/2018     Priority: Medium     History of stroke 02/02/2018     Priority: Medium     Mild cognitive impairment 09/22/2017     Priority: Medium     S/P carotid endarterectomy 09/06/2017     Priority: Medium     Underwent for symptomatic right carotid artery stenosis        Carotid stenosis, symptomatic w/o infarct, right 08/31/2017     Priority: Medium     Thyroid nodule 08/23/2017     Priority: Medium     Trigger point of extremity 08/23/2017     Priority: Medium     Trochanteric bursitis of right hip 08/23/2017     Priority: Medium     CKD (chronic kidney disease) stage 3, GFR 30-59 ml/min (H) 08/16/2017     Priority: Medium     Transient cerebral ischemia, unspecified  type 08/01/2017     Priority: Medium     CVA (cerebral vascular accident) (H) 07/26/2017     Priority: Medium     Chronic obstructive pulmonary disease, unspecified COPD type (H) 07/25/2017     Priority: Medium     Generalized muscle weakness 07/25/2017     Priority: Medium     Dizziness 07/25/2017     Priority: Medium     Osteoarthritis of right hip, unspecified osteoarthritis type 07/25/2017     Priority: Medium     Alzheimer's dementia without behavioral disturbance 05/12/2017     Priority: Medium     Retention of urine 04/15/2017     Priority: Medium     History of intracranial aneurysm 04/14/2017     Priority: Medium     Postherpetic neuralgia 11/14/2016     Priority: Medium     Umbilical hernia without obstruction and without gangrene 06/03/2016     Priority: Medium     Spinal stenosis of lumbar region 01/11/2016     Priority: Medium     Spondylolisthesis, lumbar region 01/11/2016     Priority: Medium     BPPV (benign paroxysmal positional vertigo) 11/27/2014     Priority: Medium     Dyspepsia 11/27/2014     Priority: Medium     Female stress incontinence 11/27/2014     Priority: Medium     History of bradycardia 11/27/2014     Priority: Medium     History of DVT (deep vein thrombosis) 11/27/2014     Priority: Medium     History of herpes zoster 11/27/2014     Priority: Medium     Constipation 10/20/2012     Priority: Medium     Rectocele 08/31/2012     Priority: Medium     Hip joint replacement status 04/18/2012     Priority: Medium     Osteoarthritis 04/18/2012     Priority: Medium     DDD (degenerative disc disease), lumbar 04/18/2012     Priority: Medium     Mild major depression (H) 04/18/2012     Priority: Medium     Incontinence of urine 04/18/2012     Priority: Medium     Hyperlipidemia 12/07/2011     Priority: Medium     Osteoporosis 10/25/2011     Priority: Medium     S/P laminectomy 10/25/2011     Priority: Medium     CARDIOVASCULAR SCREENING; LDL GOAL LESS THAN 100 10/31/2010     Priority: Medium      Normocytic anemia 02/17/2010     Priority: Medium     CHF (congestive heart failure) (H) 09/17/2008     Priority: Medium     Diastolic disfunction - ECHP 2008       Restrictive lung disease 09/17/2008     Priority: Medium     PFT 4/2008       Renovascular hypertension 11/09/2006     Priority: Medium     Problem list name updated by automated process. Provider to review       Benign neoplasm of colon 10/04/2006     Priority: Medium     Angiodysplasia - due for repeat 10 years.  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 NO edema. Continue current Therapy.HYPERTENSION: Dynacirc CR 10mg daily initiated today. Will continue adjusting blood pressure medicatins as needed until readings at target.VITAMIN D  DEFICIENCY - Completed therapy, discontinue ergocalciferol.ANEMIA, EPO DEFICIENCY - Hgb 10.2. Not on EPO. Continue observation for now. Recnet Hgb drop due to lumbar laminectomy.  Problem list name updated by automated process. Provider to review       Essential hypertension, benign 05/01/2006     Priority: Medium     Atherosclerosis of renal artery (H)      Priority: Medium     Left renal artery stenosis       Esophageal reflux      Priority: Medium     Gastroesophageal Reflux Disease       Cerebral aneurysm, nonruptured      Priority: Medium     Cerebral Aneurysm - unchanged on rcent MRI.  Seen by neurosurg.  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  Pt was hospitilzed 2x for the pain at [facility] --pt hopsitized for 3 days.  Rcords not ab=vailable.  She was told either \" twisted intestine or blockage of bowel.  Pt feels it is the hiatal hernia.  Pt hospitilized again a second time  A month ago.  Ct scans x 2 showed \" nothing.\"  Pt had a barium and xrays.  Pt sa     Abdominal pain, generalized 3/15/2006    1 month of abdominal pain that llqwhich radiates into her back and chest.  Pt was hospitilzed 2x for the pain at [facility] --pt hopsitized for 3 days.  Rcords not ab=vailable.  She was told either \" twisted intestine or blockage of bowel.  Pt feels it is the hiatal hernia.  Pt hospitilized again a second time  A month ago.  Ct scans x 2 showed \" nothing.\"  Pt had a barium and xrays.  Pt sa     Atherosclerosis of renal artery (H)     Left renal artery stenosis     BENIGN HYPERTENSION 5/1/2006 5/1/2006   Increase catapres to 0.3 mg and decrease clonidine to 0.1 mg daily.  Recheck bp in 1 month.      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     Dyspepsia     Generalized osteoarthrosis, unspecified site     DJD-chronic back pain     Herpes zoster dermatitis of eyelid      Insomnia, unspecified      Lumbago     Chronic back pain     Other chest pain     Atypical Chest Pain     Other specified cardiac dysrhythmias(427.89)     Bradycardia, improved on lower dose beta blockers      Rectocele     Grade 3     Unspecified disorder of kidney and ureter     Renal insufficiency     " Unspecified essential hypertension      Past Surgical History:   Procedure Laterality Date     CHOLECYSTECTOMY, LAPOROSCOPIC  1997    Cholecystectomy, Laparoscopic     ENDARTERECTOMY CAROTID Right 8/31/2017    Procedure: ENDARTERECTOMY CAROTID;  RIGHT CAROTID ENDARTERECTOMY WITH EEG;  Surgeon: Hilario Parry MD;  Location: [facility] OR     HYSTERECTOMY, RAYMOND  1982    oophorectomy,RAYMOND     SURGICAL HISTORY OF -       Laminectomy x 3     SURGICAL HISTORY OF -       MCA Aneurysm repair     SURGICAL HISTORY OF -   1996    Bladder suspension (Bladder Repair x2)     SURGICAL HISTORY OF -       Bilateral Hand Surgeries for Arthritis x2     SURGICAL HISTORY OF -       Repair of a Cerebral Aneurysm     Current Outpatient Medications   Medication Sig Dispense Refill     ACETAMINOPHEN EXTRA STRENGTH 500 MG tablet TAKE TWO TABLETS (1000MG) BY MOUTH THREE TIMES DAILY 120 tablet 98     ASPIRIN LOW DOSE 81 MG chewable tablet CHEW AND SWALLOW ONE TABLET BY MOUTH ONCE DAILY 30 tablet 11     atorvastatin (LIPITOR) 40 MG tablet TAKE 1 TABLET BY MOUTH ONCE DAILY 30 tablet 98     Blood Glucose Monitoring Suppl CORY 2 times daily Call nurse for further directions if blood glucose is less than 80 or greater than 250       BREO ELLIPTA 100-25 MCG/INH inhaler INHALE 1 PUFF BY MOUTH ONCE DAILY 1 Inhaler 11     calcium carbonate (TUMS) 500 MG chewable tablet Take 1 chew tab by mouth every hour as needed for heartburn       DONEPEZIL HCL PO Take 5 mg by mouth daily       DULoxetine (CYMBALTA) 30 MG capsule Take 2 capsules (60 mg) by mouth daily AND 1 capsule (30 mg) At Bedtime. 90 capsule 11     ferrous sulfate (FEROSUL) 325 (65 Fe) MG tablet Take 1 tablet (325 mg) by mouth daily (with breakfast) 60 tablet 98     fluticasone (FLONASE) 50 MCG/ACT nasal spray Spray 1 spray into both nostrils 2 times daily       furosemide (LASIX) 40 MG tablet Take 40 mg by mouth daily       furosemide (LASIX) 40 MG tablet Take 1 tablet (40 mg) by mouth 2 times  daily 60 tablet 11     gabapentin (NEURONTIN) 100 MG capsule Take 2 capsules (200 mg) by mouth 2 times daily 120 capsule 11     hypromellose (ARTIFICIAL TEARS) 0.5 % SOLN ophthalmic solution Place 1 drop into both eyes every 6 hours as needed for dry eyes       isradipine (DYNACIRC) 5 MG capsule Take 5 mg by mouth 2 times daily       levalbuterol (XOPENEX) 1.25 MG/3ML neb solution Take 1 ampule by nebulization every 4 hours as needed for shortness of breath / dyspnea or wheezing And every 4 hours PRN       Lidocaine (LIDOCARE) 4 % Patch Place 1 patch onto the skin daily as needed To desired area (shoulder or hip). On for 12hrs, off for 12hrs       lisinopril (PRINIVIL/ZESTRIL) 20 MG tablet TAKE 1 TABLET BY MOUTH ONCE DAILY 30 tablet 98     MAGNESIUM OXIDE PO Take 250 mg by mouth daily       Menthol, Topical Analgesic, (BIOFREEZE) 4 % GEL Externally apply topically 4 times daily as needed To left shoulder and right hip       nystatin (MYCOSTATIN) 928762 UNIT/GM external powder APPLY TOPICALLY TO ABDOMEN FOLDS, GROIN, AND BREASTS TWICE DAILY AS NEEDED 60 g 97     OMEPRAZOLE PO Take 40 mg by mouth every morning       oxyCODONE (ROXICODONE) 5 MG tablet Take 1 tablet (5 mg) by mouth 3 times daily. May also take 1 tablet (5 mg) 2 times daily as needed for severe pain. 60 tablet 0     senna-docusate (SENOKOT-S/PERICOLACE) 8.6-50 MG tablet Take 1 tablet by mouth 2 times daily as needed        tiZANidine (ZANAFLEX) 2 MG tablet TAKE 1 TABLET BY MOUTH THREE TIMES DAILY AS NEEDED 90 tablet 97     vitamin C (ASCORBIC ACID) 500 MG tablet TAKE 1 TAB BY MOUTH ONCE DAILY FOR SUPPLEMENT  0     vitamin D3 (CHOLECALCIFEROL) 1000 units (25 mcg) tablet Take 1 tablet (1,000 Units) by mouth daily 60 tablet 11     oxyCODONE (OXYCONTIN) 10 MG 12 hr tablet Take 1 tablet (10 mg) by mouth every 12 hours maximum 2 tablet(s) per day (Patient not taking: Reported on 5/30/2019) 60 tablet 0     oxyCODONE (ROXICODONE) 5 MG tablet Take 1 tablet (5 mg)  by mouth daily as needed for severe pain (Patient not taking: Reported on 2019) 30 tablet 0     OTC products: None, except as noted above and Aspirin    Allergies   Allergen Reactions     Accupril      Ace Inhibitors Unknown     Accupril     Augmentin Unknown     Blood-Group Specific Substance      Other reaction(s): *Unknown  Patient has a Non-specific antibody. Blood Product orders may be delayed.  Draw one red top and two purple top tubes for ALL Type and Screen/ Type and Crossmatch orders.      Levofloxacin Unknown, Muscle Pain (Myalgia) and Other (See Comments)     Comment: myalgias, Description:   Pt prescribed ciprofloxacin in 2018 (no rxn documented)  Myalgias       Morphine Other (See Comments)     hallucinations     Nitrofurantoin Nausea and Vomiting and Unknown     Norvasc [Amlodipine Besylate]      Leg swelling       Quinapril Other (See Comments) and Unknown     Hypertension. 3.29.18 - Takes and tolerates lisinopril  Patient reports HTN with as reaction to this medication        Latex Allergy: NO    Social History     Tobacco Use     Smoking status: Former Smoker     Last attempt to quit: 1992     Years since quittin.4     Smokeless tobacco: Never Used   Substance Use Topics     Alcohol use: Yes     Comment: wine occas.     History   Drug Use No       REVIEW OF SYSTEMS:   10 point ROS of systems including Constitutional, Eyes, Respiratory, Cardiovascular, Gastroenterology, Genitourinary, Integumentary, Musculoskeletal, Psychiatric were all negative except for pertinent positives noted in my HPI.    EXAM:   /60   Pulse 84   Temp 98  F (36.7  C)   Resp 18   Wt 88.5 kg (195 lb)   SpO2 93%   BMI 34.54 kg/m     GENERAL APPEARANCE:  Alert, in no distress, oriented, cooperative  ENT:  Mouth and posterior oropharynx normal, moist mucous membranes, poor dentition, missing multiple teeth in upper and lower jaw  EYES:  EOM, conjunctivae, lids, pupils and irises normal  RESP:   respiratory effort and palpation of chest normal, lungs clear to auscultation , no respiratory distress, no cough noted, on RA, LACEY  CV:  Palpation and auscultation of heart done , regular rate and rhythm, no murmur, rub, or gallop, +2 pedal pulses, peripheral edema 2+ in BLE  ABDOMEN:  no guarding or rebound, bowel sounds normal, soft, non-tender  M/S:   decreased ROM to right hip, tenderness, to right hip, primarily non-ambulatory  SKIN:  Inspection of skin and subcutaneous tissue baseline. Right groin seroma soft, tender to touch, size decrease from previous exam  NEURO:   Cranial nerves 2-12 are normal tested and grossly at patient's baseline, Examination of sensation by touch normal  PSYCH:  oriented X 3, normal insight, judgement and memory, affect and mood normal    DIAGNOSTICS:     EKG: appears normal, NSR, normal axis, normal intervals, no acute ST/T changes c/w ischemia, no LVH by voltage criteria, unchanged from previous tracings  Labs Drawn and in Process:   Unresulted Labs Ordered in the Past 30 Days of this Admission     No orders found from 4/5/2019 to 6/5/2019.        Urine culture ordered to be [facility] at [facility]    Recent Labs   Lab Test 05/11/19  1509 05/10/19  1104  02/19/19  0546  08/31/17  1113   HGB 12.2 11.9   < > 10.2*   < >  --     167   < > 228   < > 172   INR  --  1.02  --  1.09  --   --     143   < > 142   < > 141   POTASSIUM 4.2 3.8   < > 3.9   < > 5.3   CR 1.18* 1.43*   < > 1.53*   < > 1.42*   A1C  --   --   --   --   --  6.2*    < > = values in this interval not displayed.        IMPRESSION:   Reason for surgery/procedure: removal of urethral mass  Diagnosis/reason for consult: Urethral mass, possible malignant    The proposed surgical procedure is considered INTERMEDIATE risk.    REVISED CARDIAC RISK INDEX  The patient has the following serious cardiovascular risks for perioperative complications such as (MI, PE, VFib and 3  AV Block):  Congestive Heart Failure (pulmonary  edema, PND, s3 holli, CXR with pulmonary congestion, basilar rales)  Cerebrovascular Disease (TIA or CVA)  INTERPRETATION: 2 risks: Class III (moderate risk - 6.6% complication rate)    The patient has the following additional risks for perioperative complications:  Morbid obesity    No diagnosis found.    ORDERS:     --Consult hospital rounder / IM to assist post-op medical management    Cardiovascular Risk  Beta blockers contraindicated due to h/o of bradycardia on beta blocker      Pulmonary Risk  Incentive spirometry post op  Respiratory Therapy (Respiratory Care IP Consult)  post op  NG tube decompression if abdominal distension or significant vomiting   Maximize COPD treatment continue current inhalers, PRN neb       Anemia  Anemia and does not require treatment prior to surgery.  Monitor Hemoglobin postoperatively.      --Patient is to take all scheduled medications on the day of surgery EXCEPT for modifications listed below.    Anticoagulant or Antiplatelet Medication Use  ASPIRIN: Discontinue ASA 7-10 days prior to procedure to reduce bleeding risk.  It should be resumed post-operatively.        ACE Inhibitor or Angiotensin Receptor Blocker (ARB) Use  Ace inhibitor or Angiotensin Receptor Blocker (ARB) and will continue this medication due to the higher risk of uncontrolled perioperative hypertension (e.g. neurosurgical procedure)      APPROVAL GIVEN to proceed with proposed procedure, without further diagnostic evaluation    Orders:  1. Send UC  2. BMP, CBC on next lab day - Dx: urethral mass, anemia  3. Please attach H&P from Xi3 list tp pre-op papers(NP will fax to [facility] on completion)  4. Hold ASA starting 6/5/2019  5. On 6/10/19 hold Donepezil , ferrous sulfate, mag oxide  6. Discontinue vitamin c   7. Continue oxycodone 5mg TID and 5mg BID PRN until after cytoscopy, then if Oxycontin  available will attempt to start after return to [facility].        Signed Electronically by: MARY Gómez  CNP    Copy of this evaluation report is provided to requesting physician.    [facility] Preop Guidelines    Revised Cardiac Risk Index               Sincerely,        MARY Gómez CNP

## 2019-06-05 RX ORDER — OXYCODONE HYDROCHLORIDE 5 MG/1
5 TABLET ORAL DAILY PRN
Qty: 30 TABLET | Refills: 0 | Status: SHIPPED | OUTPATIENT
Start: 2019-06-05 | End: 2019-06-07

## 2019-06-05 RX ORDER — OXYCODONE HYDROCHLORIDE 5 MG/1
TABLET ORAL
Qty: 60 TABLET | Refills: 0 | Status: SHIPPED | OUTPATIENT
Start: 2019-06-05 | End: 2019-08-12

## 2019-06-06 ENCOUNTER — HOSPITAL LABORATORY (OUTPATIENT)
Facility: OTHER | Age: 84
End: 2019-06-06

## 2019-06-06 ENCOUNTER — ANCILLARY PROCEDURE (OUTPATIENT)
Dept: MRI IMAGING | Facility: CLINIC | Age: 84
End: 2019-06-06
Attending: UROLOGY
Payer: COMMERCIAL

## 2019-06-06 DIAGNOSIS — N36.8 MASS OF URETHRA: ICD-10-CM

## 2019-06-06 DIAGNOSIS — R31.0 GROSS HEMATURIA: ICD-10-CM

## 2019-06-06 LAB
ANION GAP SERPL CALCULATED.3IONS-SCNC: 7 MMOL/L (ref 3–14)
BUN SERPL-MCNC: 37 MG/DL (ref 7–30)
CALCIUM SERPL-MCNC: 10 MG/DL (ref 8.5–10.1)
CHLORIDE SERPL-SCNC: 106 MMOL/L (ref 94–109)
CO2 SERPL-SCNC: 30 MMOL/L (ref 20–32)
CREAT SERPL-MCNC: 1.24 MG/DL (ref 0.52–1.04)
ERYTHROCYTE [DISTWIDTH] IN BLOOD BY AUTOMATED COUNT: 14.6 % (ref 10–15)
GFR SERPL CREATININE-BSD FRML MDRD: 39 ML/MIN/{1.73_M2}
GLUCOSE SERPL-MCNC: 164 MG/DL (ref 70–99)
HCT VFR BLD AUTO: 44.6 % (ref 35–47)
HGB BLD-MCNC: 13.4 G/DL (ref 11.7–15.7)
MCH RBC QN AUTO: 28.3 PG (ref 26.5–33)
MCHC RBC AUTO-ENTMCNC: 30 G/DL (ref 31.5–36.5)
MCV RBC AUTO: 94 FL (ref 78–100)
PLATELET # BLD AUTO: 202 10E9/L (ref 150–450)
POTASSIUM SERPL-SCNC: 3.5 MMOL/L (ref 3.4–5.3)
RBC # BLD AUTO: 4.73 10E12/L (ref 3.8–5.2)
SODIUM SERPL-SCNC: 143 MMOL/L (ref 133–144)
WBC # BLD AUTO: 7.5 10E9/L (ref 4–11)

## 2019-06-07 ENCOUNTER — PATIENT OUTREACH (OUTPATIENT)
Dept: GERIATRIC MEDICINE | Facility: CLINIC | Age: 84
End: 2019-06-07

## 2019-06-07 LAB
BACTERIA SPEC CULT: NORMAL
Lab: NORMAL
SPECIMEN SOURCE: NORMAL

## 2019-06-07 NOTE — OR NURSING
During preop phone call for urology procedure UU 6-10-19, Jazmin mentioned that she has been unable to obtain more pull ups and pads for incontinence.She left messages with people and  spoke to Mirian Weldon on 6-4-19.   LM with  geriatric services, Mirian Weldon, Laura George and Nicole at  geriatrics. Told Jazmin that I would get back to her today about supplies.

## 2019-06-07 NOTE — PROGRESS NOTES
6-7-19   CC received call from [employer] stating that [place] was attempting to contact CC because member states she is out of products.  CC did receive a VM from Fe at [facility] [phone] pre op stating she would like a return call.     CC was in the area when she received this call so CC stopped at [facility] and spoke to director and to RN director.  CC expressed frustration with member reaching out to multiple people in regards to her products.  CC also had received calls from financial worker stating that member is calling her about products.      CC stated that member has not reached out to her to request more products so asked that RN staff work with member to see if she out of products or if they are not correct so that CC can make the needed changed.      CC said that SEVERO Cole had just talked with CC on Tuesday and asked for request and that this had been done and products should be coming out on monthly shipping order this week or early next week.     Sarai, RN director said they would follow up with member and also then designate one person to the be the contact for member so she knows who to report and this person will reach out to CC.      CC then offered to have meeting with , RN staff to discuss how to mange this moving forward with appointments and transportation so that both , RN staff and CC are aware.      Member is scheduled for surgery this coming week so this will be scheduled in a few weeks to then also follow up to see how member is doing post op.     CC will f/u as needed.   Mirian Weldon MA [facility] Coordinator   [phone]

## 2019-06-09 ENCOUNTER — MEDICAL CORRESPONDENCE (OUTPATIENT)
Dept: HEALTH INFORMATION MANAGEMENT | Facility: CLINIC | Age: 84
End: 2019-06-09

## 2019-06-10 ENCOUNTER — ANESTHESIA EVENT (OUTPATIENT)
Dept: SURGERY | Facility: CLINIC | Age: 84
End: 2019-06-10
Payer: COMMERCIAL

## 2019-06-10 ENCOUNTER — HOSPITAL ENCOUNTER (OUTPATIENT)
Facility: CLINIC | Age: 84
Setting detail: OBSERVATION
Discharge: HOME | End: 2019-06-11
Attending: UROLOGY | Admitting: UROLOGY
Payer: COMMERCIAL

## 2019-06-10 ENCOUNTER — ANESTHESIA (OUTPATIENT)
Dept: SURGERY | Facility: CLINIC | Age: 84
End: 2019-06-10
Payer: COMMERCIAL

## 2019-06-10 DIAGNOSIS — N36.8 MASS OF URETHRA: Primary | ICD-10-CM

## 2019-06-10 PROBLEM — Z98.890 POST-OPERATIVE STATE: Status: ACTIVE | Noted: 2019-06-10

## 2019-06-10 LAB — GLUCOSE BLDC GLUCOMTR-MCNC: 112 MG/DL (ref 70–99)

## 2019-06-10 PROCEDURE — 25000132 ZZH RX MED GY IP 250 OP 250 PS 637: Performed by: STUDENT IN AN ORGANIZED HEALTH CARE EDUCATION/TRAINING PROGRAM

## 2019-06-10 PROCEDURE — 25000128 H RX IP 250 OP 636: Performed by: UROLOGY

## 2019-06-10 PROCEDURE — 27210794 ZZH OR GENERAL SUPPLY STERILE: Performed by: UROLOGY

## 2019-06-10 PROCEDURE — 40000170 ZZH STATISTIC PRE-PROCEDURE ASSESSMENT II: Performed by: UROLOGY

## 2019-06-10 PROCEDURE — 71000014 ZZH RECOVERY PHASE 1 LEVEL 2 FIRST HR: Performed by: UROLOGY

## 2019-06-10 PROCEDURE — 25000125 ZZHC RX 250: Performed by: NURSE ANESTHETIST, CERTIFIED REGISTERED

## 2019-06-10 PROCEDURE — 37000008 ZZH ANESTHESIA TECHNICAL FEE, 1ST 30 MIN: Performed by: UROLOGY

## 2019-06-10 PROCEDURE — 00000146 ZZHCL STATISTIC GLUCOSE BY METER IP

## 2019-06-10 PROCEDURE — G0378 HOSPITAL OBSERVATION PER HR: HCPCS

## 2019-06-10 PROCEDURE — 36000057 ZZH SURGERY LEVEL 3 1ST 30 MIN - UMMC: Performed by: UROLOGY

## 2019-06-10 PROCEDURE — 37000009 ZZH ANESTHESIA TECHNICAL FEE, EACH ADDTL 15 MIN: Performed by: UROLOGY

## 2019-06-10 PROCEDURE — 88305 TISSUE EXAM BY PATHOLOGIST: CPT | Performed by: UROLOGY

## 2019-06-10 PROCEDURE — 25000132 ZZH RX MED GY IP 250 OP 250 PS 637: Performed by: ANESTHESIOLOGY

## 2019-06-10 PROCEDURE — 25000132 ZZH RX MED GY IP 250 OP 250 PS 637: Performed by: UROLOGY

## 2019-06-10 PROCEDURE — 71000015 ZZH RECOVERY PHASE 1 LEVEL 2 EA ADDTL HR: Performed by: UROLOGY

## 2019-06-10 PROCEDURE — 25800030 ZZH RX IP 258 OP 636: Performed by: NURSE ANESTHETIST, CERTIFIED REGISTERED

## 2019-06-10 PROCEDURE — 25000125 ZZHC RX 250: Performed by: ANESTHESIOLOGY

## 2019-06-10 PROCEDURE — 25000128 H RX IP 250 OP 636: Performed by: NURSE ANESTHETIST, CERTIFIED REGISTERED

## 2019-06-10 PROCEDURE — 25000566 ZZH SEVOFLURANE, EA 15 MIN: Performed by: UROLOGY

## 2019-06-10 PROCEDURE — 36000059 ZZH SURGERY LEVEL 3 EA 15 ADDTL MIN UMMC: Performed by: UROLOGY

## 2019-06-10 PROCEDURE — 88305 TISSUE EXAM BY PATHOLOGIST: CPT | Mod: 26 | Performed by: UROLOGY

## 2019-06-10 RX ORDER — ACETAMINOPHEN 325 MG/1
650 TABLET ORAL EVERY 4 HOURS PRN
Status: DISCONTINUED | OUTPATIENT
Start: 2019-06-10 | End: 2019-06-11 | Stop reason: HOSPADM

## 2019-06-10 RX ORDER — KETAMINE HYDROCHLORIDE 10 MG/ML
INJECTION, SOLUTION INTRAMUSCULAR; INTRAVENOUS PRN
Status: DISCONTINUED | OUTPATIENT
Start: 2019-06-10 | End: 2019-06-10

## 2019-06-10 RX ORDER — FLUTICASONE PROPIONATE 50 MCG
1 SPRAY, SUSPENSION (ML) NASAL 2 TIMES DAILY
Status: DISCONTINUED | OUTPATIENT
Start: 2019-06-10 | End: 2019-06-11 | Stop reason: HOSPADM

## 2019-06-10 RX ORDER — TRAMADOL HYDROCHLORIDE 50 MG/1
50 TABLET ORAL EVERY 6 HOURS PRN
Qty: 15 TABLET | Refills: 0 | Status: SHIPPED | OUTPATIENT
Start: 2019-06-10 | End: 2019-06-11

## 2019-06-10 RX ORDER — AMOXICILLIN 250 MG
1 CAPSULE ORAL 2 TIMES DAILY PRN
Status: DISCONTINUED | OUTPATIENT
Start: 2019-06-10 | End: 2019-06-11 | Stop reason: HOSPADM

## 2019-06-10 RX ORDER — ONDANSETRON 2 MG/ML
4 INJECTION INTRAMUSCULAR; INTRAVENOUS EVERY 30 MIN PRN
Status: DISCONTINUED | OUTPATIENT
Start: 2019-06-10 | End: 2019-06-10

## 2019-06-10 RX ORDER — TIZANIDINE 2 MG/1
2 TABLET ORAL EVERY 8 HOURS PRN
Status: DISCONTINUED | OUTPATIENT
Start: 2019-06-10 | End: 2019-06-11 | Stop reason: HOSPADM

## 2019-06-10 RX ORDER — SODIUM CHLORIDE, SODIUM LACTATE, POTASSIUM CHLORIDE, CALCIUM CHLORIDE 600; 310; 30; 20 MG/100ML; MG/100ML; MG/100ML; MG/100ML
INJECTION, SOLUTION INTRAVENOUS CONTINUOUS PRN
Status: DISCONTINUED | OUTPATIENT
Start: 2019-06-10 | End: 2019-06-10

## 2019-06-10 RX ORDER — CEFAZOLIN SODIUM 1 G/3ML
1 INJECTION, POWDER, FOR SOLUTION INTRAMUSCULAR; INTRAVENOUS SEE ADMIN INSTRUCTIONS
Status: DISCONTINUED | OUTPATIENT
Start: 2019-06-10 | End: 2019-06-10 | Stop reason: HOSPADM

## 2019-06-10 RX ORDER — ACETAMINOPHEN 325 MG/1
650 TABLET ORAL EVERY 4 HOURS PRN
Status: DISCONTINUED | OUTPATIENT
Start: 2019-06-10 | End: 2019-06-10

## 2019-06-10 RX ORDER — EPHEDRINE SULFATE 50 MG/ML
INJECTION, SOLUTION INTRAMUSCULAR; INTRAVENOUS; SUBCUTANEOUS PRN
Status: DISCONTINUED | OUTPATIENT
Start: 2019-06-10 | End: 2019-06-10

## 2019-06-10 RX ORDER — ACETAMINOPHEN 325 MG/1
650 TABLET ORAL EVERY 4 HOURS PRN
Qty: 60 TABLET | Refills: 0 | Status: SHIPPED | OUTPATIENT
Start: 2019-06-10 | End: 2019-07-30

## 2019-06-10 RX ORDER — MEPERIDINE HYDROCHLORIDE 25 MG/ML
12.5 INJECTION INTRAMUSCULAR; INTRAVENOUS; SUBCUTANEOUS
Status: DISCONTINUED | OUTPATIENT
Start: 2019-06-10 | End: 2019-06-10

## 2019-06-10 RX ORDER — OXYCODONE HCL 10 MG/1
10 TABLET, FILM COATED, EXTENDED RELEASE ORAL EVERY 12 HOURS
Status: DISCONTINUED | OUTPATIENT
Start: 2019-06-10 | End: 2019-06-11 | Stop reason: HOSPADM

## 2019-06-10 RX ORDER — FENTANYL CITRATE 50 UG/ML
INJECTION, SOLUTION INTRAMUSCULAR; INTRAVENOUS PRN
Status: DISCONTINUED | OUTPATIENT
Start: 2019-06-10 | End: 2019-06-10

## 2019-06-10 RX ORDER — AMOXICILLIN 250 MG
2 CAPSULE ORAL 2 TIMES DAILY PRN
Status: DISCONTINUED | OUTPATIENT
Start: 2019-06-10 | End: 2019-06-11 | Stop reason: HOSPADM

## 2019-06-10 RX ORDER — DEXAMETHASONE SODIUM PHOSPHATE 4 MG/ML
INJECTION, SOLUTION INTRA-ARTICULAR; INTRALESIONAL; INTRAMUSCULAR; INTRAVENOUS; SOFT TISSUE PRN
Status: DISCONTINUED | OUTPATIENT
Start: 2019-06-10 | End: 2019-06-10

## 2019-06-10 RX ORDER — FELODIPINE 5 MG/1
10 TABLET, EXTENDED RELEASE ORAL DAILY
Status: DISCONTINUED | OUTPATIENT
Start: 2019-06-11 | End: 2019-06-11 | Stop reason: HOSPADM

## 2019-06-10 RX ORDER — LISINOPRIL 20 MG/1
20 TABLET ORAL DAILY
Status: DISCONTINUED | OUTPATIENT
Start: 2019-06-10 | End: 2019-06-10

## 2019-06-10 RX ORDER — PROPOFOL 10 MG/ML
INJECTION, EMULSION INTRAVENOUS PRN
Status: DISCONTINUED | OUTPATIENT
Start: 2019-06-10 | End: 2019-06-10

## 2019-06-10 RX ORDER — OXYCODONE HCL 10 MG/1
10 TABLET, FILM COATED, EXTENDED RELEASE ORAL EVERY 12 HOURS
Status: DISCONTINUED | OUTPATIENT
Start: 2019-06-10 | End: 2019-06-10

## 2019-06-10 RX ORDER — NALOXONE HYDROCHLORIDE 0.4 MG/ML
.1-.4 INJECTION, SOLUTION INTRAMUSCULAR; INTRAVENOUS; SUBCUTANEOUS
Status: DISCONTINUED | OUTPATIENT
Start: 2019-06-10 | End: 2019-06-10

## 2019-06-10 RX ORDER — FUROSEMIDE 40 MG
40 TABLET ORAL 2 TIMES DAILY
Status: DISCONTINUED | OUTPATIENT
Start: 2019-06-10 | End: 2019-06-10

## 2019-06-10 RX ORDER — TRAMADOL HYDROCHLORIDE 50 MG/1
50 TABLET ORAL EVERY 6 HOURS PRN
Status: DISCONTINUED | OUTPATIENT
Start: 2019-06-10 | End: 2019-06-10

## 2019-06-10 RX ORDER — GABAPENTIN 100 MG/1
200 CAPSULE ORAL 2 TIMES DAILY
Status: DISCONTINUED | OUTPATIENT
Start: 2019-06-10 | End: 2019-06-11 | Stop reason: HOSPADM

## 2019-06-10 RX ORDER — ATORVASTATIN CALCIUM 40 MG/1
40 TABLET, FILM COATED ORAL DAILY
Status: DISCONTINUED | OUTPATIENT
Start: 2019-06-11 | End: 2019-06-11 | Stop reason: HOSPADM

## 2019-06-10 RX ORDER — SODIUM CHLORIDE, SODIUM LACTATE, POTASSIUM CHLORIDE, CALCIUM CHLORIDE 600; 310; 30; 20 MG/100ML; MG/100ML; MG/100ML; MG/100ML
INJECTION, SOLUTION INTRAVENOUS CONTINUOUS
Status: DISCONTINUED | OUTPATIENT
Start: 2019-06-10 | End: 2019-06-10 | Stop reason: HOSPADM

## 2019-06-10 RX ORDER — NALOXONE HYDROCHLORIDE 0.4 MG/ML
.1-.4 INJECTION, SOLUTION INTRAMUSCULAR; INTRAVENOUS; SUBCUTANEOUS
Status: DISCONTINUED | OUTPATIENT
Start: 2019-06-10 | End: 2019-06-11 | Stop reason: HOSPADM

## 2019-06-10 RX ORDER — ALBUTEROL SULFATE 0.83 MG/ML
2.5 SOLUTION RESPIRATORY (INHALATION) EVERY 6 HOURS PRN
Status: DISCONTINUED | OUTPATIENT
Start: 2019-06-10 | End: 2019-06-11 | Stop reason: HOSPADM

## 2019-06-10 RX ORDER — OXYCODONE HYDROCHLORIDE 5 MG/1
5 TABLET ORAL EVERY 6 HOURS PRN
Status: DISCONTINUED | OUTPATIENT
Start: 2019-06-10 | End: 2019-06-11 | Stop reason: HOSPADM

## 2019-06-10 RX ORDER — DULOXETIN HYDROCHLORIDE 30 MG/1
30 CAPSULE, DELAYED RELEASE ORAL DAILY
Status: DISCONTINUED | OUTPATIENT
Start: 2019-06-11 | End: 2019-06-11 | Stop reason: HOSPADM

## 2019-06-10 RX ORDER — ALBUTEROL SULFATE 0.83 MG/ML
2.5 SOLUTION RESPIRATORY (INHALATION) EVERY 6 HOURS PRN
Status: DISCONTINUED | OUTPATIENT
Start: 2019-06-10 | End: 2019-06-10

## 2019-06-10 RX ORDER — CEFAZOLIN SODIUM 2 G/100ML
2 INJECTION, SOLUTION INTRAVENOUS
Status: COMPLETED | OUTPATIENT
Start: 2019-06-10 | End: 2019-06-10

## 2019-06-10 RX ORDER — ISRADIPINE 5 MG/1
5 CAPSULE ORAL 2 TIMES DAILY
Status: DISCONTINUED | OUTPATIENT
Start: 2019-06-10 | End: 2019-06-10 | Stop reason: CLARIF

## 2019-06-10 RX ORDER — ONDANSETRON 4 MG/1
4 TABLET, ORALLY DISINTEGRATING ORAL EVERY 30 MIN PRN
Status: DISCONTINUED | OUTPATIENT
Start: 2019-06-10 | End: 2019-06-10

## 2019-06-10 RX ORDER — ONDANSETRON 2 MG/ML
INJECTION INTRAMUSCULAR; INTRAVENOUS PRN
Status: DISCONTINUED | OUTPATIENT
Start: 2019-06-10 | End: 2019-06-10

## 2019-06-10 RX ORDER — SODIUM CHLORIDE, SODIUM LACTATE, POTASSIUM CHLORIDE, CALCIUM CHLORIDE 600; 310; 30; 20 MG/100ML; MG/100ML; MG/100ML; MG/100ML
INJECTION, SOLUTION INTRAVENOUS CONTINUOUS
Status: DISCONTINUED | OUTPATIENT
Start: 2019-06-10 | End: 2019-06-10

## 2019-06-10 RX ORDER — CALCIUM CARBONATE 500 MG/1
500 TABLET, CHEWABLE ORAL
Status: DISCONTINUED | OUTPATIENT
Start: 2019-06-10 | End: 2019-06-11 | Stop reason: HOSPADM

## 2019-06-10 RX ORDER — FENTANYL CITRATE 50 UG/ML
25-50 INJECTION, SOLUTION INTRAMUSCULAR; INTRAVENOUS
Status: DISCONTINUED | OUTPATIENT
Start: 2019-06-10 | End: 2019-06-10

## 2019-06-10 RX ORDER — DONEPEZIL HYDROCHLORIDE 5 MG/1
5 TABLET, FILM COATED ORAL DAILY
Status: DISCONTINUED | OUTPATIENT
Start: 2019-06-10 | End: 2019-06-11 | Stop reason: HOSPADM

## 2019-06-10 RX ORDER — CITRIC ACID/SODIUM CITRATE 334-500MG
30 SOLUTION, ORAL ORAL ONCE
Status: COMPLETED | OUTPATIENT
Start: 2019-06-10 | End: 2019-06-10

## 2019-06-10 RX ORDER — OXYCODONE HCL 10 MG/1
10 TABLET, FILM COATED, EXTENDED RELEASE ORAL EVERY 12 HOURS PRN
Status: DISCONTINUED | OUTPATIENT
Start: 2019-06-10 | End: 2019-06-10

## 2019-06-10 RX ORDER — CITRIC ACID/SODIUM CITRATE 334-500MG
30 SOLUTION, ORAL ORAL
Status: DISCONTINUED | OUTPATIENT
Start: 2019-06-10 | End: 2019-06-10 | Stop reason: HOSPADM

## 2019-06-10 RX ADMIN — DONEPEZIL HYDROCHLORIDE 5 MG: 5 TABLET ORAL at 21:13

## 2019-06-10 RX ADMIN — DEXAMETHASONE SODIUM PHOSPHATE 4 MG: 4 INJECTION, SOLUTION INTRAMUSCULAR; INTRAVENOUS at 13:52

## 2019-06-10 RX ADMIN — KETAMINE HYDROCHLORIDE 10 MG: 10 INJECTION, SOLUTION INTRAMUSCULAR; INTRAVENOUS at 13:55

## 2019-06-10 RX ADMIN — Medication 50 MG: at 14:10

## 2019-06-10 RX ADMIN — CEFAZOLIN SODIUM 2 G: 2 INJECTION, SOLUTION INTRAVENOUS at 13:48

## 2019-06-10 RX ADMIN — Medication 5 MG: at 14:08

## 2019-06-10 RX ADMIN — SODIUM CHLORIDE, POTASSIUM CHLORIDE, SODIUM LACTATE AND CALCIUM CHLORIDE: 600; 310; 30; 20 INJECTION, SOLUTION INTRAVENOUS at 13:50

## 2019-06-10 RX ADMIN — ALBUTEROL SULFATE 2.5 MG: 2.5 SOLUTION RESPIRATORY (INHALATION) at 17:20

## 2019-06-10 RX ADMIN — GABAPENTIN 200 MG: 100 CAPSULE ORAL at 21:13

## 2019-06-10 RX ADMIN — OXYCODONE HYDROCHLORIDE 10 MG: 10 TABLET, FILM COATED, EXTENDED RELEASE ORAL at 21:31

## 2019-06-10 RX ADMIN — KETAMINE HYDROCHLORIDE 10 MG: 10 INJECTION, SOLUTION INTRAMUSCULAR; INTRAVENOUS at 14:44

## 2019-06-10 RX ADMIN — Medication 5 MG: at 14:06

## 2019-06-10 RX ADMIN — FENTANYL CITRATE 10 MCG: 50 INJECTION, SOLUTION INTRAMUSCULAR; INTRAVENOUS at 14:32

## 2019-06-10 RX ADMIN — SODIUM CITRATE AND CITRIC ACID MONOHYDRATE 30 ML: 500; 334 SOLUTION ORAL at 13:13

## 2019-06-10 RX ADMIN — TIZANIDINE 2 MG: 2 TABLET ORAL at 21:13

## 2019-06-10 RX ADMIN — TRAMADOL HYDROCHLORIDE 50 MG: 50 TABLET, COATED ORAL at 18:26

## 2019-06-10 RX ADMIN — PROPOFOL 50 MG: 10 INJECTION, EMULSION INTRAVENOUS at 13:55

## 2019-06-10 RX ADMIN — Medication 5 MG: at 14:04

## 2019-06-10 RX ADMIN — FENTANYL CITRATE 25 MCG: 50 INJECTION, SOLUTION INTRAMUSCULAR; INTRAVENOUS at 13:55

## 2019-06-10 RX ADMIN — ONDANSETRON 4 MG: 2 INJECTION INTRAMUSCULAR; INTRAVENOUS at 13:52

## 2019-06-10 RX ADMIN — ACETAMINOPHEN 650 MG: 325 TABLET, FILM COATED ORAL at 16:28

## 2019-06-10 RX ADMIN — FLUTICASONE PROPIONATE 1 SPRAY: 50 SPRAY, METERED NASAL at 21:13

## 2019-06-10 RX ADMIN — FLUTICASONE FUROATE AND VILANTEROL TRIFENATATE 1 PUFF: 100; 25 POWDER RESPIRATORY (INHALATION) at 21:13

## 2019-06-10 ASSESSMENT — ENCOUNTER SYMPTOMS: APNEA: 0

## 2019-06-10 ASSESSMENT — MIFFLIN-ST. JEOR: SCORE: 1292.13

## 2019-06-10 NOTE — OP NOTE
OPERATIVE REPORT  06/10/2019     PREOPERATIVE DIAGNOSIS:                   gross hematuria, urethral lesion  POSTOPERATIVE DIAGNOSIS:                 Same    PROCEDURES PERFORMED:   1. Rigid cystourethroscopy  2. Biopsy, fulguration of bladder lesion  3. Excision of urethral lesion  4. Urethral reconstruction    STAFF SURGEON:    Yesy Fong MD   ASSISTANT:               Dorothy Dillon MD (Urology Resident)  ANESTHESIA:                       General ET  EBL:     50 mL.   IV FLUIDS:    see dictated anesthesia record  COMPLICATIONS:   None.   SPECIMEN:     Urethral lesion      Right bladder dome lesion       BRIEF OPERATIVE INDICATIONS: Ms. Jazmin Clifton is a(n) 86 year old FEMALE with recent gross hematuria, pain with urgency, frequency and urinary incontinence who was found to have a urethral lesion on exam and was counseled on alternatives and decided to undergo the above named surgery.      DESCRIPTION OF PROCEDURE: After full informed voluntary consent was obtained  the patient was transported to the operating room, placed supine on the table. After adequate anesthesia was induced, patient was positioned in dorsal lithotomy in supportive yellowfin stirrups with care in neural safety in positioning of all four extremities. She was then prepped and draped in the usual sterile fashion. A timeout was taken to confirm correct patient and procedure.     We began with diagnostic cystoscopy placing a 22 Fr rigid cystoscope and inspecting the bladder. There was some cloudiness to the media and moderate trabeculation as well as diffuse erythema. There was a 5 mm area of necrotic white tissue right of the bladder dome which was biopsied and fulgurated. Hemostasis was confirmed with flow off. Ureteral orifices were orthotopic bilaterally and clear efflux was noted from each individually. We then made a careful inspection of the urethra on pull back and the proximal and middle urethra were without abnormality.     The  entire urethral lesion confined to the external/protruding aspect of the urethra. Digital vaginal exam was without gross abnormality.      We then placed a 18 Fr Maldonado catheter for the duration of the case. We placed an external retractor for improved exposure. We placed four 3-0 PDS sutures as stay sutures at 3 and  9 o clock,  demarcating the visually normal appearing surrounding urethra which consisted of the posterior/left 1/2 of the urethera circumferentially, about 1 cm in total. The lesion was firm on exam. We then sharply excised the lesion circumferentially using 15 blade and electrocautery. We then used PDS sutures sparingly as needed along the urethral edge for hemostasis and tissue approximation. We verified good hemostasis with copious irrigation. .      At this time all counts were reported as correct.  Patient was awoken from anesthesia.  Patient tolerated the procedure well. No apparent complications noted. She was transported to the postanesthesia care unit in stable condition.    - Will continue Maldonado catheter until removed in clinic, about one week  - Follow up with Dr. Fong as scheduled    Dorothy Dillon MD  PGY 2 Urology    Addendum:    I, Yesy Fong, was present and scrubbed for the entire case.  I agree with the note as above with changes made as needed.  I spoke to her son in the waiting room at the end of the case    Yesy Fong MD MPH   of Urology

## 2019-06-10 NOTE — OR NURSING
Pt waiting for Bed to be available  [facility] [address] called and notified that pt will be staying overnight.  Pt tolerated po soft foods, 50% of tray.

## 2019-06-10 NOTE — OR NURSING
Pt received 2 regular tylenol for C/O sore throat, crushed in apple sauce.  Pt had a very brief SVT of 127BPM,  lasting less than 3 seconds see strip  Pt has had no complaints of chest pain or SOB since arrival to PACu

## 2019-06-10 NOTE — ANESTHESIA PREPROCEDURE EVALUATION
"Anesthesia Pre-Procedure Evaluation    Patient: Jazmin Clifton   MRN:     [id number] Gender:   female   Age:    86 year old :      1932        Preoperative Diagnosis: Gross Hematuria, Mass Of Urethra   Procedure(s):  Cystoscopy With Possible Biopsy, Removal Of Urethral Lesion  Possible Urethral Reconstruction     Past Medical History:   Diagnosis Date     ABDOMINAL PAIN GENERALIZED 3/15/2006    1 month of abdominal pain that llqwhich radiates into her back and chest.  Pt was hospitilzed 2x for the pain at [facility] --pt hopsitized for 3 days.  Rcords not ab=vailable.  She was told either \" twisted intestine or blockage of bowel.  Pt feels it is the hiatal hernia.  Pt hospitilized again a second time  A month ago.  Ct scans x 2 showed \" nothing.\"  Pt had a barium and xrays.  Pt sa     Abdominal pain, generalized 3/15/2006    1 month of abdominal pain that llqwhich radiates into her back and chest.  Pt was hospitilzed 2x for the pain at [facility] --pt hopsitized for 3 days.  Rcords not ab=vailable.  She was told either \" twisted intestine or blockage of bowel.  Pt feels it is the hiatal hernia.  Pt hospitilized again a second time  A month ago.  Ct scans x 2 showed \" nothing.\"  Pt had a barium and xrays.  Pt sa     Atherosclerosis of renal artery (H)     Left renal artery stenosis     BENIGN HYPERTENSION 2006   Increase catapres to 0.3 mg and decrease clonidine to 0.1 mg daily.  Recheck bp in 1 month.      Benign neoplasm of scalp and skin of neck     Seborrheic keratosis     Cerebral aneurysm, nonruptured     Cerebral Aneurysm     Congestive heart failure (H)      Depressive disorder, not elsewhere classified     Depression     Esophageal reflux     Gastroesophageal Reflux Disease     Female stress incontinence      Gastrointestinal malfunction arising from mental factors     Dyspepsia     Generalized osteoarthrosis, unspecified site     DJD-chronic back pain     Herpes zoster " dermatitis of eyelid      Insomnia, unspecified      Lumbago     Chronic back pain     Other chest pain     Atypical Chest Pain     Other specified cardiac dysrhythmias(427.89)     Bradycardia, improved on lower dose beta blockers      Rectocele     Grade 3     Unspecified disorder of kidney and ureter     Renal insufficiency     Unspecified essential hypertension       Past Surgical History:   Procedure Laterality Date     CHOLECYSTECTOMY, LAPOROSCOPIC  1997    Cholecystectomy, Laparoscopic     ENDARTERECTOMY CAROTID Right 8/31/2017    Procedure: ENDARTERECTOMY CAROTID;  RIGHT CAROTID ENDARTERECTOMY WITH EEG;  Surgeon: Hilario Parry MD;  Location: [facility] OR     HYSTERECTOMY, RAYMOND  1982    oophorectomy,RAYMOND     SURGICAL HISTORY OF -       Laminectomy x 3     SURGICAL HISTORY OF -       MCA Aneurysm repair     SURGICAL HISTORY OF -   1996    Bladder suspension (Bladder Repair x2)     SURGICAL HISTORY OF -       Bilateral Hand Surgeries for Arthritis x2     SURGICAL HISTORY OF -       Repair of a Cerebral Aneurysm          Anesthesia Evaluation    ROS/Med Hx    No history of anesthetic complications  (-) malignant hyperthermia and tuberculosis  Comments: Met with Jazmin and her son. She has been NPO. She is scheduled for cystoscopy and uretheral exam for a uretheral mass; she has obesity with a history of hypertension, intracranial aneurysm, obesity, left hip replacement, significant right hip pain, inability to easily ambulate; is sometimes able to use a walker and stand and get to bed; No history of diabetes mellitus; history of CHF; she was a past smoker since age 13 upto 1992; She does admit to GERD and says is on meds.     Cardiovascular Findings   (+) hypertension,   Comments: Cardiac Echo: 2/19/2019: Interpretation Summary     The left ventricle is normal in size.  There is mild concentric left ventricular hypertrophy.  Left ventricular systolic function is normal.  The visual ejection fraction is estimated at  55-60%.  The right ventricle is normal in size and function.  The left atrium is mildly dilated.  Mild aortic root dilatation.  The ascending aorta is Mildly dilated.  No hemodynamically significant valvular abnormalities on 2D or color flow  imaging.     Compared to the study from 7/27/2017, there are no significant change.  The study was technically difficult. Limited views were obtained. Contrast was  used without apparent complications.    EKG 5/10/2019:  Sinus  Rhythm   Low voltage in precordial leads.    -Nonspecific QRS widening.    -Old inferior infarct.         Neuro Findings   Comments: History of non ruptured cerebral aneurysm; she says it is on both sides;     Pulmonary Findings   (-) asthma and apnea    HENT Findings - negative HENT ROS    Skin Findings - negative skin ROS      GI/Hepatic/Renal Findings   (+) GERD and renal disease (chronic kidney disease stage 3 with a ureteral disorder; )    GERD is well controlled    Endocrine/Metabolic Findings - negative ROS      Genetic/Syndrome Findings - negative genetics/syndromes ROS    Hematology/Oncology Findings - negative hematology/oncology ROS    Additional Notes  Allergies:   -- Accupril    -- Ace Inhibitors -- Unknown    --  Accupril   -- Augmentin -- Unknown   -- Blood-Group Specific Substance     --  Other reaction(s): *Unknown             Patient has a Non-specific antibody. Blood Product             orders may be delayed.  Draw one red top and two             purple top tubes for ALL Type and Screen/ Type and             Crossmatch orders.   -- Levofloxacin -- Unknown, Muscle Pain (Myalgia) and                            Other (See Comments)    --  Comment: myalgias, Description:             Pt prescribed ciprofloxacin in Jan 2018 (no rxn             documented)Myalgias   -- Morphine -- Other (See Comments)    --  hallucinations   -- Nitrofurantoin -- Nausea and Vomiting and Unknown   -- Norvasc (Amlodipine Besylate)     --  Leg swelling   --  Quinapril -- Other (See Comments) and Unknown    --  Hypertension. 3.29.18 - Takes and tolerates             lisinopril             Patient reports HTN with as reaction to this             Medication    Medications Prior to Admission:  ACETAMINOPHEN EXTRA STRENGTH 500 MG tablet, TAKE TWO TABLETS (1000MG) BY MOUTH THREE TIMES DAILY, Disp: 120 tablet, Rfl: 98, 6/10/2019 at 0700  atorvastatin (LIPITOR) 40 MG tablet, TAKE 1 TABLET BY MOUTH ONCE DAILY, Disp: 30 tablet, Rfl: 98, 6/9/2019 at 1700  Blood Glucose Monitoring Suppl CORY, 2 times daily Call nurse for further directions if blood glucose is less than 80 or greater than 250, Disp: , Rfl: , 6/10/2019 at Unknown time  BREO ELLIPTA 100-25 MCG/INH inhaler, INHALE 1 PUFF BY MOUTH ONCE DAILY, Disp: 1 Inhaler, Rfl: 11, Past Month at Unknown time  DULoxetine (CYMBALTA) 30 MG capsule, Take 2 capsules (60 mg) by mouth daily AND 1 capsule (30 mg) At Bedtime., Disp: 90 capsule, Rfl: 11, 6/10/2019 at 0700  fluticasone (FLONASE) 50 MCG/ACT nasal spray, Spray 1 spray into both nostrils 2 times daily, Disp: , Rfl: , 6/10/2019 at 0700  furosemide (LASIX) 40 MG tablet, Take 1 tablet (40 mg) by mouth 2 times daily, Disp: 60 tablet, Rfl: 11, 6/10/2019 at 0700  gabapentin (NEURONTIN) 100 MG capsule, Take 2 capsules (200 mg) by mouth 2 times daily, Disp: 120 capsule, Rfl: 11, 6/10/2019 at 0700  hypromellose (ARTIFICIAL TEARS) 0.5 % SOLN ophthalmic solution, Place 1 drop into both eyes every 6 hours as needed for dry eyes, Disp: , Rfl: , 6/9/2019 at 0700  isradipine (DYNACIRC) 5 MG capsule, Take 5 mg by mouth 2 times daily, Disp: , Rfl: , 6/9/2019 at Unknown time  lisinopril (PRINIVIL/ZESTRIL) 20 MG tablet, TAKE 1 TABLET BY MOUTH ONCE DAILY, Disp: 30 tablet, Rfl: 98, 6/9/2019 at Unknown time  Menthol, Topical Analgesic, (BIOFREEZE) 4 % GEL, Externally apply topically 4 times daily as needed To left shoulder and right hip, Disp: , Rfl: , Past Week at Unknown time  nystatin (MYCOSTATIN)  674676 UNIT/GM external powder, APPLY TOPICALLY TO ABDOMEN FOLDS, GROIN, AND BREASTS TWICE DAILY AS NEEDED, Disp: 60 g, Rfl: 97, Past Week at Unknown time  OMEPRAZOLE PO, Take 40 mg by mouth every morning, Disp: , Rfl: , 6/9/2019 at Unknown time  oxyCODONE (OXYCONTIN) 10 MG 12 hr tablet, Take 1 tablet (10 mg) by mouth every 12 hours maximum 2 tablet(s) per day, Disp: 60 tablet, Rfl: 0, 6/9/2019 at 1700  oxyCODONE (ROXICODONE) 5 MG tablet, Take 1 tablet (5 mg) by mouth 3 times daily. May also take 1 tablet (5 mg) 2 times daily as needed for severe pain. Continue until OxyContin ER available, then stop, Disp: 60 tablet, Rfl: 0, 6/10/2019 at 0700  tiZANidine (ZANAFLEX) 2 MG tablet, TAKE 1 TABLET BY MOUTH THREE TIMES DAILY AS NEEDED, Disp: 90 tablet, Rfl: 97, Past Month at Unknown time  vitamin C (ASCORBIC ACID) 500 MG tablet, TAKE 1 TAB BY MOUTH ONCE DAILY FOR SUPPLEMENT, Disp: , Rfl: 0, Past Week at Unknown time  vitamin D3 (CHOLECALCIFEROL) 1000 units (25 mcg) tablet, Take 1 tablet (1,000 Units) by mouth daily, Disp: 60 tablet, Rfl: 11, Past Week at Unknown time  ASPIRIN LOW DOSE 81 MG chewable tablet, CHEW AND SWALLOW ONE TABLET BY MOUTH ONCE DAILY, Disp: 30 tablet, Rfl: 11, 6/8/2019  calcium carbonate (TUMS) 500 MG chewable tablet, Take 1 chew tab by mouth every hour as needed for heartburn, Disp: , Rfl: , Taking  DONEPEZIL HCL PO, Take 5 mg by mouth daily, Disp: , Rfl: , 6/9/2019  ferrous sulfate (FEROSUL) 325 (65 Fe) MG tablet, Take 1 tablet (325 mg) by mouth daily (with breakfast), Disp: 60 tablet, Rfl: 98, 6/6/2019  levalbuterol (XOPENEX) 1.25 MG/3ML neb solution, Take 1 ampule by nebulization every 4 hours as needed for shortness of breath / dyspnea or wheezing And every 4 hours PRN, Disp: , Rfl: , More than a month at Unknown time  Lidocaine (LIDOCARE) 4 % Patch, Place 1 patch onto the skin daily as needed To desired area (shoulder or hip). On for 12hrs, off for 12hrs, Disp: , Rfl: , 6/7/2019  MAGNESIUM OXIDE  PO, Take 250 mg by mouth daily, Disp: , Rfl: , 6/9/2019  senna-docusate (SENOKOT-S/PERICOLACE) 8.6-50 MG tablet, Take 1 tablet by mouth 2 times daily as needed , Disp: , Rfl: , 6/9/2019    Current Facility-Administered Medications:  ceFAZolin (ANCEF) 1 g vial to attach to  ml bag for ADULT or 50 ml bag for PEDS  ceFAZolin (ANCEF) intermittent infusion 2 g in 100 mL dextrose PRE-MIX  lactated ringers infusion  sodium citrate-citric acid (BICITRA) solution 30 mL            PHYSICAL EXAM:   Mental Status/Neuro: A/A/O   Airway: Facies: Challenging  Mallampati: III  Mouth/Opening: Full  TM distance: < 6 cm  Neck ROM: Full   Respiratory: Auscultation: CTAB     Resp. Rate: Normal     Resp. Effort: Normal      CV: Rhythm: Regular  Rate: Age appropriate  Heart: Normal Sounds   Comments:      Dental:  Dental Comments: Missing many teeth has a few lower and a few upper; she says she is scheduled for removal of all teeth in the near future to get upper and lower dentures                  Lab Results   Component Value Date    WBC 7.5 06/06/2019    HGB 13.4 06/06/2019    HCT 44.6 06/06/2019     06/06/2019    CRP 15.4 (H) 02/19/2019    SED 30 04/17/2007     06/06/2019    POTASSIUM 3.5 06/06/2019    CHLORIDE 106 06/06/2019    CO2 30 06/06/2019    BUN 37 (H) 06/06/2019    CR 1.24 (H) 06/06/2019     (H) 06/06/2019    BLAIR 10.0 06/06/2019    PHOS 3.9 04/18/2007    MAG 2.6 (H) 06/18/2018    ALBUMIN 3.2 (L) 05/10/2019    PROTTOTAL 6.0 (L) 05/10/2019    ALT 13 05/10/2019    AST 11 05/10/2019    ALKPHOS 79 05/10/2019    BILITOTAL 0.4 05/10/2019    LIPASE 112 05/10/2019    AMYLASE 75 04/17/2007    PTT 33 02/19/2019    INR 1.02 05/10/2019    TSH 0.51 05/10/2019         Preop Vitals  BP Readings from Last 3 Encounters:   06/10/19 163/83   06/04/19 136/60   05/30/19 133/65    Pulse Readings from Last 3 Encounters:   06/10/19 76   06/04/19 84   05/30/19 73      Resp Readings from Last 3 Encounters:   06/10/19 18  "  06/04/19 18   05/24/19 16    SpO2 Readings from Last 3 Encounters:   06/10/19 93%   06/04/19 93%   05/24/19 96%      Temp Readings from Last 1 Encounters:   06/10/19 36.5  C (97.7  F) (Oral)    Ht Readings from Last 1 Encounters:   06/10/19 1.6 m (5' 3\")      Wt Readings from Last 1 Encounters:   06/10/19 88.3 kg (194 lb 10.7 oz)    Estimated body mass index is 34.48 kg/m  as calculated from the following:    Height as of this encounter: 1.6 m (5' 3\").    Weight as of this encounter: 88.3 kg (194 lb 10.7 oz).     LDA:  Peripheral IV 06/10/19 Left Upper forearm (Active)   Number of days: 0          Assessment:   ASA SCORE: 4    NPO Status: > 2 hours since completed Clear Liquids; > 6 hours since completed Solid Foods   Documentation: H&P complete; Preop Testing complete; Consents complete   Proceeding: Proceed without further delay  Tobacco Use:  NO Active use of Tobacco/UNKNOWN Tobacco use status     Plan:   Anes. Type:  General   Pre-Induction: None   Induction:  IV (Standard)   Airway: LMA   Access/Monitoring: PIV; 2nd PIV   Maintenance: Balanced   Emergence: Recovery Site (PACU/ICU)   Logistics: Same Day Surgery     Postop Pain/Sedation Strategy:  Standard-Options: Opioids PRN     PONV Management:  Adult Risk Factors: Female, Non-Smoker, Postop Opioids  Prevention: Ondansetron; Dexamethasone     CONSENT: Direct conversation   Plan and risks discussed with: Patient; Other   Blood Products: Consented (ALL Blood Products)       Comments for Plan/Consent:  She requests GA and her son is in agreement. She understands that because of her comorbidities she is a high risk candidate and understands procedures and risks and consents. Qs answered.                Bin Warren MD  "

## 2019-06-10 NOTE — OR NURSING
Post albuterol treatment pt continues to have oxygen saturations of 87% on room air  Consulted with Dr Chase, pt will  Be overnight observation. Dr Dorothy ingram stated she would place orders in.

## 2019-06-10 NOTE — OR NURSING
Dr Canales here to do a ling assessment, felt breath sounds were Quiet, room air oxygen saturations are 87-91. On room air, Pt does IS with results or 1000,   Coughing and deep breathing exercises causes pt to cough and cough and get gaggy. Pt ate applesauce recently without any difficulty.  Pt Received Albuterol neb, to help with lung aeration.  Continuing to monitor.

## 2019-06-10 NOTE — DISCHARGE INSTRUCTIONS
Same-Day Surgery   Adult Discharge Orders & Instructions     For 24 hours after surgery:  1. Get plenty of rest.  A responsible adult must stay with you for at least 24 hours after you leave the hospital.   2. Pain medication can slow your reflexes. Do not drive or use heavy equipment.  If you have weakness or tingling, don't drive or use heavy equipment until this feeling goes away.  3. Mixing alcohol and pain medication can cause dizziness and slow your breathing. It can even be fatal. Do not drink alcohol while taking pain medication.  4. Avoid strenuous or risky activities.  Ask for help when climbing stairs.   5. You may feel lightheaded.  If so, sit for a few minutes before standing.  Have someone help you get up.   6. If you have nausea (feel sick to your stomach), drink only clear liquids such as apple juice, ginger ale, broth or 7-Up.  Rest may also help.  Be sure to drink enough fluids.  Move to a regular diet as you feel able. Take pain medications with a small amount of solid food, such as toast or crackers, to avoid nausea.   7. A slight fever is normal. Call the doctor if your fever is over 100 F (37.7 C) (taken under the tongue) or lasts longer than 24 hours.  8. You may have a dry mouth, muscle aches, trouble sleeping or a sore throat.  These symptoms should go away after 24 hours.  9. Do not make important or legal decisions.   Pain Management:      1. Take pain medication (if prescribed) for pain as directed by your physician.        2. WARNING: If the pain medication you have been prescribed contains Tylenol  (acetaminophen), DO NOT take additional doses of Tylenol (acetaminophen).     Call your doctor for any of the followin.  Signs of infection (fever, growing tenderness at the surgery site, severe pain, a large amount of drainage or bleeding, foul-smelling drainage, redness, swelling).    2.  It has been over 8 to 10 hours since surgery and you are still not able to urinate (pee).    3.   Headache for over 24 hours.    4.  Numbness, tingling or weakness the day after surgery (if you had spinal anesthesia).  To contact a doctor, call _____________________________________ or:      [phone] and ask for the Resident On Call for:          __________________________________________ (answered 24 hours a day)      Emergency Department:  [facility]: [phone]  [facility]: [phone]               Rev. 10/2014   Discharge Instructions: Following a Cystoscopy/Stent and/or Ureteroscopy    Drainage:    Urine may be slightly bloody but should taper off in a few days.    You may have some frequency and urgency for a few days.    Comfort:    A burning sensation when urinating is common. Drinking extra fluids helps decrease this burning feeling and also helps to clear the bloody urine.    Mild abdominal cramps may occur for a few days.    Baths:    Daily tub baths aide in passing urine.    Frequent use of bubble bath is discouraged since it encourages urinary tract infections.    Report to your doctor at once if you experience:    Inability to pass your urine    Continuous or heavy bleeding    Severe Pain    Elevated temperature > than 101.5 for 24 hours    Home Activity:    The day of surgery spend a quiet evening at home.    Increase activity as tolerated.    Rev. 5/12

## 2019-06-10 NOTE — OR NURSING
Pt asked for sip of water, sat up, drank water without difficulty.  Several minutes after writer asked pt to perform coughing and deep breathing  Which pt did x2 then began to cough and retch and continued for several coughs and expectorated phlegm and a scant ammt of water.  Pt states she will easily  retching when taking her morning pills.

## 2019-06-10 NOTE — ANESTHESIA POSTPROCEDURE EVALUATION
Anesthesia POST Procedure Evaluation    Patient: Jazmin Clifton   MRN:     [id number] Gender:   female   Age:    86 year old :      1932        Preoperative Diagnosis: Gross Hematuria, Mass Of Urethra   Procedure(s):  Cystoscopy With Possible Biopsy, Removal Of Urethral Lesion  Urethral Reconstruction   Postop Comments: No value filed.       Anesthesia Type:  General  No value filed.    Reportable Event: NO     PAIN: Uncomplicated   Sign Out status: Comfortable, Well controlled pain     PONV: No PONV   Sign Out status:  No Nausea or Vomiting     Neuro/Psych: Uneventful perioperative course   Sign Out Status: Preoperative baseline; Age appropriate mentation     Airway/Resp.: Uneventful perioperative course   Sign Out Status: Other     Breathing Assessment: pt requires NC to maintain O2 sat greater than 92%.  Room air pt desat to 86%.     CV: Uneventful perioperative course   Sign Out status: Appropriate BP and perfusion indices; Appropriate HR/Rhythm     Disposition:   Sign Out in:  PACU  Disposition:  Floor  Recovery Course: Uneventful  Follow-Up: Not required     Comments/Narrative:  Pt to be admitted overnight .           Last Anesthesia Record Vitals:  CRNA VITALS  6/10/2019 1452 - 6/10/2019 1552      6/10/2019             Pulse:  68    SpO2:  98 %          Last PACU Vitals:  Vitals Value Taken Time   /70 6/10/2019  6:00 PM   Temp 37.1  C (98.8  F) 6/10/2019  5:30 PM   Pulse 72 6/10/2019  6:00 PM   Resp 30 6/10/2019  6:02 PM   SpO2 98 % 6/10/2019  6:02 PM   Temp src     NIBP     Pulse     SpO2     Resp     Temp     Ht Rate     Temp 2     Vitals shown include unvalidated device data.      Electronically Signed By: Uzma Canales MD, Meredith 10, 2019, 6:03 PM

## 2019-06-10 NOTE — OR NURSING
PACU to Inpatient Nursing Handoff    Patient Jazmin Clifton is a 86 year old female who speaks English.   Procedure Procedure(s):  Cystoscopy With Possible Biopsy, Removal Of Urethral Lesion  Urethral Reconstruction   Surgeon(s) Primary: Yesy Fong MD  Resident - Assisting: Dorothy Dillon MD     Allergies   Allergen Reactions     Accupril      Ace Inhibitors Unknown     Accupril     Augmentin Unknown     Blood-Group Specific Substance      Other reaction(s): *Unknown  Patient has a Non-specific antibody. Blood Product orders may be delayed.  Draw one red top and two purple top tubes for ALL Type and Screen/ Type and Crossmatch orders.      Levofloxacin Unknown, Muscle Pain (Myalgia) and Other (See Comments)     Comment: myalgias, Description:   Pt prescribed ciprofloxacin in Jan 2018 (no rxn documented)  Myalgias       Morphine Other (See Comments)     hallucinations     Nitrofurantoin Nausea and Vomiting and Unknown     Norvasc [Amlodipine Besylate]      Leg swelling       Quinapril Other (See Comments) and Unknown     Hypertension. 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    Anesthesia General   Dermatome Level     Preop Meds Not applicable   Nerve block Not applicable   Intraop Meds dexamethasone (Decadron)  fentanyl (Sublimaze): 10 mcg total  ondansetron (Zofran): last given at 1600   Local Meds No   Antibiotics Not applicable     Pain Patient Currently in Pain: yes  Comfort: tolerable with discomfort  Pain Control: partially effective   PACU meds  ultram 50 mg @ 1800   PCA / epidural No   Capnography     Telemetry ECG Rhythm: Sinus rhythm   Inpatient Telemetry Monitor Ordered? No        Labs Glucose Lab Results   Component Value Date     06/06/2019       Hgb Lab Results   Component Value Date    HGB 13.4 06/06/2019       INR Lab Results   Component Value Date    INR 1.02 05/10/2019      PACU Imaging Not applicable     Wound/Incision Rash other (see comments) lower groin patch (Active)   Distribution regional 5/12/2019  9:15 AM   Configuration/Shape asymmetric 5/12/2019  9:15 AM   Borders indistinct 5/12/2019  9:15 AM   Characteristics dry 5/12/2019  9:15 AM   Color pink 5/12/2019  9:15 AM   Lesion Size less than 0.5 cm 5/12/2019  9:15 AM   Care, Rash antimicrobial agent applied 5/12/2019  9:15 AM   Number of days:        Incision/Surgical Site 06/10/19 Urethral meatus (Active)   Incision Assessment [facility] 6/10/2019  4:50 PM   Incision Drainage Amount Scant 6/10/2019  4:50 PM   Drainage Description Serosanguinous 6/10/2019  4:50 PM   Dressing Intervention Open to air / No Dressing 6/10/2019  4:50 PM   Number of days: 0       Incision/Surgical Site 06/10/19 Labia (Active)   Incision Assessment [facility] 6/10/2019  4:50 PM   Incision Drainage Amount Scant 6/10/2019  4:50 PM   Drainage Description Serosanguinous 6/10/2019  4:50 PM   Dressing Intervention Open to air / No Dressing 6/10/2019  4:50 PM   Number of days: 0      CMS        Equipment Not applicable   Other LDA       IV Access Peripheral IV 06/10/19 Left Upper forearm (Active)   Site Assessment [facility] 6/10/2019  6:00  PM   Line Status Infusing 6/10/2019  6:00 PM   Phlebitis Scale 0-->no symptoms 6/10/2019  6:00 PM   Number of days: 0      Blood Products Not applicable EBL 5 mL   Intake/Output Date 06/10/19 0700 - 06/11/19 0659   Shift 0936-4720 4669-1812 1506-7747 24 Hour Total   INTAKE   P.O.  210  210   I.V.  650  650   Shift Total(mL/kg)  860(9.74)  860(9.74)   OUTPUT   Urine  110  110   Blood  40  40   Shift Total(mL/kg)  150(1.7)  150(1.7)   Weight (kg) 88.3 88.3 88.3 88.3      Drains / Maldonado Urethral Catheter Double-lumen;Non-latex;Straight-tip 18 fr (Active)   Collection Container Standard;Patent 6/10/2019  4:45 PM   Rationale for Continued Use /GI/GYN Pelvic Procedure 6/10/2019  3:25 PM   Urine Output 110 mL 6/10/2019  4:45 PM   Number of days: 0      Time of void PreOp Void Prior to Procedure: 0918 (06/10/19 0937)    PostOp      Diapered? No   Bladder Scan      mL(appllesauce) (06/10/19 1800)  crackers, water and milk     Vitals    B/P: 151/70  T: 98.8  F (37.1  C)    Temp src: Axillary  P:  Pulse: 72 (06/10/19 1800)    Heart Rate: 80 (06/10/19 1825)     R: 17  O2:  SpO2: 94 %    O2 Device: Nasal cannula (06/10/19 1745)    Oxygen Delivery: 2 LPM (06/10/19 1745)         Family/support present son went home for the evening   Patient belongings     Patient transported on cart   DC meds/scripts (obs/outpt) Yes, scripts   Inpatient Pain Meds Released? Yes       Special needs/considerations None   Tasks needing completion None       Uma Nolan RN  [facility] [id number]

## 2019-06-10 NOTE — ANESTHESIA CARE TRANSFER NOTE
Patient: Jazmin Clifton    Procedure(s):  Cystoscopy With Possible Biopsy, Removal Of Urethral Lesion  Urethral Reconstruction    Diagnosis: Gross Hematuria, Mass Of Urethra  Diagnosis Additional Information: No value filed.    Anesthesia Type:   No value filed.     Note:  Airway :Face Mask  Patient transferred to:PACU  Comments: 159/88, sat 97%, HR 69, RR 20, T 97.7Handoff Report: Identifed the Patient, Identified the Reponsible Provider, Reviewed the pertinent medical history, Discussed the surgical course, Reviewed Intra-OP anesthesia mangement and issues during anesthesia, Set expectations for post-procedure period and Allowed opportunity for questions and acknowledgement of understanding      Vitals: (Last set prior to Anesthesia Care Transfer)    CRNA VITALS  6/10/2019 1452 - 6/10/2019 1537      6/10/2019             Pulse:  68    SpO2:  98 %                Electronically Signed By: MARY Garcia CRNA  Meredith 10, 2019  3:37 PM

## 2019-06-11 ENCOUNTER — PATIENT OUTREACH (OUTPATIENT)
Dept: CARE COORDINATION | Facility: CLINIC | Age: 84
End: 2019-06-11

## 2019-06-11 ENCOUNTER — PATIENT OUTREACH (OUTPATIENT)
Dept: GERIATRIC MEDICINE | Facility: CLINIC | Age: 84
End: 2019-06-11

## 2019-06-11 VITALS
SYSTOLIC BLOOD PRESSURE: 147 MMHG | HEIGHT: 63 IN | OXYGEN SATURATION: 93 % | BODY MASS INDEX: 34.49 KG/M2 | WEIGHT: 194.67 LBS | TEMPERATURE: 96.5 F | RESPIRATION RATE: 18 BRPM | DIASTOLIC BLOOD PRESSURE: 55 MMHG | HEART RATE: 91 BPM

## 2019-06-11 DIAGNOSIS — Z76.89 HEALTH CARE HOME: Chronic | ICD-10-CM

## 2019-06-11 PROCEDURE — 25000132 ZZH RX MED GY IP 250 OP 250 PS 637: Performed by: STUDENT IN AN ORGANIZED HEALTH CARE EDUCATION/TRAINING PROGRAM

## 2019-06-11 PROCEDURE — G0378 HOSPITAL OBSERVATION PER HR: HCPCS

## 2019-06-11 PROCEDURE — 25000132 ZZH RX MED GY IP 250 OP 250 PS 637: Performed by: UROLOGY

## 2019-06-11 RX ADMIN — OMEPRAZOLE 40 MG: 20 CAPSULE, DELAYED RELEASE ORAL at 08:09

## 2019-06-11 RX ADMIN — FLUTICASONE PROPIONATE 1 SPRAY: 50 SPRAY, METERED NASAL at 08:14

## 2019-06-11 RX ADMIN — DONEPEZIL HYDROCHLORIDE 5 MG: 5 TABLET ORAL at 08:10

## 2019-06-11 RX ADMIN — OXYCODONE HYDROCHLORIDE 5 MG: 5 TABLET ORAL at 00:00

## 2019-06-11 RX ADMIN — OXYCODONE HYDROCHLORIDE 10 MG: 10 TABLET, FILM COATED, EXTENDED RELEASE ORAL at 09:28

## 2019-06-11 RX ADMIN — FLUTICASONE FUROATE AND VILANTEROL TRIFENATATE 1 PUFF: 100; 25 POWDER RESPIRATORY (INHALATION) at 08:13

## 2019-06-11 RX ADMIN — DULOXETINE HYDROCHLORIDE 30 MG: 30 CAPSULE, DELAYED RELEASE ORAL at 08:10

## 2019-06-11 RX ADMIN — ATORVASTATIN CALCIUM 40 MG: 40 TABLET, FILM COATED ORAL at 08:10

## 2019-06-11 RX ADMIN — GABAPENTIN 200 MG: 100 CAPSULE ORAL at 08:10

## 2019-06-11 RX ADMIN — FELODIPINE 10 MG: 5 TABLET, FILM COATED, EXTENDED RELEASE ORAL at 09:29

## 2019-06-11 NOTE — PLAN OF CARE
VS: VSS   O2: Room air   Output: Voiding without difficulty in bathroom   Last BM: 6/9/19   Activity: Up with A2 and walker   Skin: Incisions to labia and urethral meatus- UTV   Pain: Pt c/o pain due to pre-existing hip issues- taking scheduled oxycontin and PRN oxycodone   CMS: Intact   Dressing: None   Diet: Regular, denies N/V   LDA: PIV SL   Plan: Discharge pending obs goals   Additional Info:    Observation goals 1930- 2300  Wean off oxygen as able. Maintain sats above 88 PRN oxygen as needed- pt weaned from 2 LPM to 1 LPM O2  Re-orient as able.- pt A&O x4   Delirium precautions. Concentrate cares, promote sleep- Done.  Pain control. Tramadol/Tylenol, avoid stronger narcotics. Ice pack at [facility] with barrier to protect skin- pt on stronger narcotics at baseline for r hip pain- narcotics reordered by Dr for comfort.    Observation goals 0023-7553  Wean off oxygen as able. Maintain sats above 88 PRN oxygen as needed- pt on 1/2 LPM O2 to keep O2 sats>88% overnight. Pt snoring/apneic at times bring O2 sats low without O2.  Re-orient as able- Pt A&O x4, pt slept most of this period  Delirium precautionleepings. Concentrate cares, promote sleep- done, pt sleeping  Pain control. Tramadol/Tylenol, avoid stronger narcotics. Ice pack at [facility] with barrier to protect skin- pt on stronger narcotics at baseline for r hip pain- narcotics reordered by  for comfort.    Observation goals 3583-5691  Wean off oxygen as able. Maintain sats above 88 PRN oxygen as needed- pt on 1/2 LPM O2 to keep O2 sats>88% overnight. Pt snoring/apneic at times bring O2 sats low without O2.  Re-orient as able- Pt A&O x4 when awake  Delirium precautions. Concentrate cares, promote sleep- done, pt sleeping  Pain control. Tramadol/Tylenol, avoid stronger narcotics. Ice pack at [facility] with barrier to protect skin- pt on stronger narcotics at baseline for r hip pain- narcotics reordered by  for comfort.

## 2019-06-11 NOTE — PHARMACY
Ran a test claim for Oxycontin ER 10mg tablets. This medication is non-formulary. Insurance prefers: Morphine Sulfate ER or Hysingla ER. Patient must try and fail these medications before a Prior Authorization will be allowed.    Feel free to contact me with any other test claims, prior authorizations, or insurance questions regarding outpatient medications.     Thanks!        Jennifer Martell  [place] [facility] Liaison     [facility]  [facility]  [address] [place] [address] [address] [address]   [email or web address]  [email or web address]   Phone: [phone]  Pager: [phone]  Fax: [phone]

## 2019-06-11 NOTE — PLAN OF CARE
VS: VSS   O2: >90% on room air    Output: Maldonado: clear yellow 150 out    Last BM: 6/9/10    Activity: Up with walker and gait belt assist x 1    Up for meals? N/a   Skin: CDI    Pain: Well controlled with pain regimen    CMS: intact   Dressing: Open to air,   Diet: Regular    LDA: PIV removed prior to discharge    Equipment: IV pump and pole, walker     Plan: Discharge to home wih belongings. Son picking up pt.    Additional Info: Discharge instructions provided and pt verbalized understanding. Pt and son refused transport and transported by wheel chair to [facility].

## 2019-06-11 NOTE — PROGRESS NOTES
TRANSITIONS OF CARE (AJAY) LOG   AJAY tasks should be completed by the CC within one (1) business day of notification of each transition. Follow up contact with member is required after return to their usual care setting.  Note:  If CC finds out about the transitions fifteen (15) days or more after the member has returned to their usual care setting, no AJAY log is needed. However, the CC should check in with the member to discuss the transition process, any changes needed to the care plan and document it in a case note.    Member Name:  Jazmin Clifton [facility] Name:  [employer] Member ID#: [id number]   Product: [facility] Care Coordinator Contact:  Mirian Weldon MA, [facility] Agency/County/Care System: [facility]   Transition Communication Actions from Care Management Contact   Transition #1   Notification Date: 6-11-19 Transition Date:   6-10-19 Transition From: Assisted Living, [facility] [address]      Is this the member s usual care setting?               yes Transition To: Hospital, [facility]    Transition Type:  Planned  Reason for Admission/Comments:  Planned Uterian surgery - registered for observations while inpatient       Shared CC contact info, care plan/services with receiving setting--Date completed: 6-11-19    Notified PCP of transition--Date completed:  6-10-19     via  EMR   Transition #2   Transition #3  (if applicable)   Notification Date: 6-11-19         Transition To:  Assisted Living, [facility] [address]   Transition Date: 6-11-19     Transition Type:    Planned  Notified PCP -- Date completed: 6-11-19              Shared CC contact info, care plan/services with receiving setting or, if applicable, home care agency--Date completed:  6-11-19  *Complete additional tasks below, if this transition is a return to usual care setting.      Comments:  CC did follow up with RN staff at [facility]. They will be seeing  Member was discharge with catheter and also HC order to follow after discontinue       Notification  Date:          Transition To:    Transition Date:              Transition Type:      Notified PCP--Date completed:          Shared CC contact info, care plan/services with receiving setting or, if applicable, home care agency--Date completed:       *Complete additional tasks below, if this transition is a return to usual care setting.      Comments:       *Complete tasks below when the member is discharging TO their usual care setting within one (1) business day of notification.  For situations where the Care Coordinator is notified of the discharge prior to the date of discharge, the Care Coordinator must follow up with the member or designated representative to confirm that discharge actually occurred and discuss required AJAY tasks as outlined in the AJAY Instructions.  (This includes situations where it may be a  new  usual care setting for the member. (i.e., a community member who decides upon permanent nursing home placement following hospitalization and rehab).    Date completed: 6-11-19  Communicated with member or their designated representative about the following:  care transition process; about changes to the member s health status; plan of care updates; education about transitions and how to prevent unplanned transitions/readmissions  Four Pillars for Optimal Transition:    Check  Yes  - if the member, family member and/or SNF/facility staff manages the following:    If  No  provide explanation in the comments section.          [x]  Yes     []  No     Does the member have a follow-up appointment scheduled with primary care or specialist? (Mental health hospitalizations--the appt. should be w/in 7 days)   [x]  Yes     []  No     Can the member manage their medications or is there a system in place to manage medications (e.g. home care set-up)?         [x]  Yes     []  No     Can the member verbalize warning signs and symptoms to watch for and how to respond?         [x]  Yes     []  No     Does the member  use a Personal Health Care Record?  Check  Yes  if visit summary, discharge summary, and/or healthcare summary are being used as a PHR.                                                                                                                                                                                    [x] Yes      [] No      Have you updated the member s care plan?  If  No  provide explanation in comments.   Comments:

## 2019-06-12 ENCOUNTER — PATIENT OUTREACH (OUTPATIENT)
Dept: UROLOGY | Facility: CLINIC | Age: 84
End: 2019-06-12

## 2019-06-12 NOTE — PROGRESS NOTES
Urology Postop Phone Note:    Ms. Jazmin Clifton is a 86 year old female who underwent Rigidcystourethroscopy, Biopsy, fulguration of bladder lesion, Excision of urethral lesion and Urethral reconstruction on 6/10/19 with Dr. Fong for a history of gross hematuria and urethral lesion.  Her postoperative course was unremarkable and the patient was d/c to assisted living on 6/11/19.    Fevers/chills: Patient denies any fever or chills.  Eating/drinking: Patient is able to eat and drink without any complaints.  Bowel habits: Patient reports constipation.  Talked about increasing fiber, water, walking and using stool softeners.  Urine output Maldonado catheter   Pain: Patient reports pain as a 0 out of 10.        Informed the patient of the clinic triage nursing # ([phone] option 2) and urology resident on call # for after hours concerns.    Thanks,   Josephine Chan   Department of Urologic Surgery

## 2019-06-12 NOTE — PROGRESS NOTES
6-11-19   CC called RN office to f/u on member who was discharged back to [facility] after surgery.  Member stayed overnight for observation and was discharged with catheter and also HC orders.      RN at AL stated she would let CC know if there were any concerns or needs for member RS tool to be reviewed.  Discussion was also had about biopsy that was done and that results will be back in about 2 week and then discussion will be as to what member wants to do for next steps after these results.     CC will then f/u as needed   Mirian Weldon MA [facility] Coordinator   [phone]

## 2019-06-13 ENCOUNTER — PATIENT OUTREACH (OUTPATIENT)
Dept: GERIATRIC MEDICINE | Facility: CLINIC | Age: 84
End: 2019-06-13

## 2019-06-13 ENCOUNTER — TELEPHONE (OUTPATIENT)
Dept: UROLOGY | Facility: CLINIC | Age: 84
End: 2019-06-13

## 2019-06-13 NOTE — TELEPHONE ENCOUNTER
Message left on Felicity from [facility] care voicemail stating that the Dysuria is most likely coming from the catheter and stitches. Dr. Yesy Fong states that the patient can try some OTC pyridium.      Michele Benoit MA

## 2019-06-13 NOTE — TELEPHONE ENCOUNTER
Velasquez Burton,     Patient had surgery with Dr. Yesy Fong on 6/10/2019. Are these symptoms normal or should she come in to leave a UA/UC. Please advise       Thanks, Michele Benoit MA

## 2019-06-13 NOTE — PROGRESS NOTES
6-13-19   CC has been working with RN staff at the AL to set up transportation for member for her post of f/u which is scheduled for 6-17.      CC contacted [facility] which [facility] has used in the past and dispatcher told CC that they only provide rides to members who are coming from [facility] or from [facility].  Dispatch was unsure if they could take a ride set up thru the health plan which is  normal way of set up transport.      CC did inform RN department at [facility] about this so they set up ride for 6-17 for post of visit and also 6-18 for pain clinic visit.  [facility] dispatch number is [phone].

## 2019-06-13 NOTE — TELEPHONE ENCOUNTER
[facility] Call Center    Phone Message    May a detailed message be left on voicemail: yes    Reason for Call: Other: Felicity calling to state pt is having burning symptoms. She thinks in might be due to catheter or stitches. Please call her back to discuss.      Action Taken: Message routed to:  [facility] ([facility]): ariadna uro

## 2019-06-14 ENCOUNTER — PATIENT OUTREACH (OUTPATIENT)
Dept: UROLOGY | Facility: CLINIC | Age: 84
End: 2019-06-14

## 2019-06-14 ENCOUNTER — PRE VISIT (OUTPATIENT)
Dept: UROLOGY | Facility: CLINIC | Age: 84
End: 2019-06-14

## 2019-06-14 DIAGNOSIS — R30.0 DYSURIA: Primary | ICD-10-CM

## 2019-06-14 RX ORDER — PHENAZOPYRIDINE HYDROCHLORIDE 100 MG/1
100 TABLET, FILM COATED ORAL 3 TIMES DAILY PRN
Qty: 9 TABLET | Refills: 1 | Status: SHIPPED | OUTPATIENT
Start: 2019-06-14 | End: 2019-09-17

## 2019-06-14 NOTE — PROGRESS NOTES
[facility] Call Center     Phone Message     May a detailed message be left on voicemail: yes     Reason for Call: Medication Question or concern regarding medication   Prescription Clarification  Name of Medication: Pyridium   Prescribing Provider: Yesy Fong MD              Pharmacy: NA                         What on the order needs clarification? Homecare services sent a note to the nurse in the facility about the pt - some complaints of burning from catheter.  they had a call in to the dr the other day and they suggested to get OTC Pyridium. Need written order for the OTC Pyridium due to the pt being in a facility. fax or call  fax [phone]          Script faxed.

## 2019-06-14 NOTE — TELEPHONE ENCOUNTER
Reason for visit: post op - review pathology     Relevant information: bladder biopsy, urethral reconstruction     Records/imaging/labs/orders: all records available    Pt called: no need for a call    At Rooming: CARY

## 2019-06-17 ENCOUNTER — OFFICE VISIT (OUTPATIENT)
Dept: UROLOGY | Facility: CLINIC | Age: 84
End: 2019-06-17
Payer: COMMERCIAL

## 2019-06-17 DIAGNOSIS — N36.2 URETHRA, POLYP: Primary | ICD-10-CM

## 2019-06-17 LAB — COPATH REPORT: NORMAL

## 2019-06-17 NOTE — NURSING NOTE
Approx 100 cc of  Normal Saline  instilled into the bladder via catheter/syringe.  Catheter then removed with out difficulty   Patient voided approx 100 cc's of clear  urine.  Patient tolerated procedure well   Teaching done with patient verbally as to where to go or call  if unable to urinate post catheter removal.  Patient was bladder scanned with no reschedule  Dr Kya Fong here for the discussion and will see the patient in 4 weeks for follow     Tara Silva RN   Care Coordinator Urology

## 2019-06-17 NOTE — LETTER
6/17/2019       RE: Jazmin Krishnamurthy [place] [address]  [address]  [facility] [address]     Dear Colleague,    Thank you for referring your patient, Jazmin Clifton, to the [facility] UROLOGY AND [facility] FOR PROSTATE AND UROLOGIC CANCERS at [facility] Please see a copy of my visit note below.    June 17, 2019    Post operative visit    Patient returns today for follow up s/p cystoscopy with bladder biopsy and removal of urethral lesion.  She is overall doing well but anxious to get the catheter out as it is burning. She denies any changes in her health since last visit.    There were no vitals taken for this visit.  She is comfortable, in no distress, non-labored breathing.  Abdomen is soft, non-tender, non-distended.      Catheter removed and patient was able to empty.  PVR by bladder scan 0 mL.  Patient reports that urination is much better after the surgery than prior    Pathology:    SPECIMEN(S):   A: Right bladder dome biopsy   B: Urethra lesion     FINAL DIAGNOSIS:   A. Right bladder dome biopsy:   - Denuded urothelium   - Negative for malignancy     A/P: 86 year old F with s/p cystoscopy with bladder biopsy and removal of bladder lesion    At this time plan to observe    Will have her return in about one month to reassess her urination, sooner if needed    Yesy Fong MD MPH   of Urology    CC  Patient Care Team:  Junie Estrella APRN CNP as PCP - General (Nurse Practitioner - Gerontology)  Toño Shafer MD as MD (Family Practice)  Aliyah Milan as Medical Assistant (Gerontology)  Sarah Mccabe as Case Management Specialist (Primary Care - CC)  Laura George as Case Management Specialist (Primary Care - CC)  Mirian Weldon LSW as Lead Care Coordinator (Primary Care - CC)  Uma Webber RPH as Pharmacist (Pharmacist)  [facility] ([facility] ([facility] ([place]  Lincoln Brady MD as MD ([facility])  Matt  Qian Carrasco MD as MD (OB/Gyn  Tara Silva RN as Specialty Care Coordinator (Urology)

## 2019-06-17 NOTE — PATIENT INSTRUCTIONS
Continue your postoperative restrictions    No baths but keep the urethra clean and dry.  You can pat dry but do not scrub.    Return to see us in about one months, sooner if needed    It was a pleasure meeting with you today.  Thank you for allowing me and my team the privilege of caring for you today.  YOU are the reason we are here, and I truly hope we provided you with the excellent service you deserve.  Please let us know if there is anything else we can do for you so that we can be sure you are leaving completely satisfied with your care experience.

## 2019-06-17 NOTE — PROGRESS NOTES
June 17, 2019    Post operative visit    Patient returns today for follow up s/p cystoscopy with bladder biopsy and removal of urethral lesion.  She is overall doing well but anxious to get the catheter out as it is burning. She denies any changes in her health since last visit.    There were no vitals taken for this visit.  She is comfortable, in no distress, non-labored breathing.  Abdomen is soft, non-tender, non-distended.      Catheter removed and patient was able to empty.  PVR by bladder scan 0 mL.  Patient reports that urination is much better after the surgery than prior    Pathology:    SPECIMEN(S):   A: Right bladder dome biopsy   B: Urethra lesion     FINAL DIAGNOSIS:   A. Right bladder dome biopsy:   - Denuded urothelium   - Negative for malignancy     A/P: 86 year old F with s/p cystoscopy with bladder biopsy and removal of bladder lesion    At this time plan to observe    Will have her return in about one month to reassess her urination, sooner if needed    Yesy Fong MD MPH   of Urology    CC  Patient Care Team:  Junie Estrella APRN CNP as PCP - General (Nurse Practitioner - Gerontology)  Toño Shafer MD as MD (Family Practice)  Aliyah Milan as Medical Assistant (Gerontology)  Sarah Mccabe as Case Management Specialist (Primary Care - CC)  Laura George as Case Management Specialist (Primary Care - CC)  Mirian Weldon LSW as Lead Care Coordinator (Primary Care - CC)  Junie Estrella APRN CNP as Assigned PCP  Uma Webber RPH as Pharmacist (Pharmacist)  [facility]  Lincoln Brady MD as MD (Family Practice)  Qian Gutierrez MD as MD (OB/Gyn)  Ajit, Yesy Weiss MD as MD (Urology)  Junie Estrella APRN CNP as Referring Physician (Nurse Practitioner - Gerontology)  Tara Silva RN as Specialty Care Coordinator (Urology)  SELF, REFERRED

## 2019-06-18 ENCOUNTER — PATIENT OUTREACH (OUTPATIENT)
Dept: GERIATRIC MEDICINE | Facility: CLINIC | Age: 84
End: 2019-06-18

## 2019-06-18 ENCOUNTER — OFFICE VISIT (OUTPATIENT)
Dept: PALLIATIVE MEDICINE | Facility: CLINIC | Age: 84
End: 2019-06-18
Payer: COMMERCIAL

## 2019-06-18 VITALS — DIASTOLIC BLOOD PRESSURE: 85 MMHG | SYSTOLIC BLOOD PRESSURE: 163 MMHG | HEART RATE: 74 BPM

## 2019-06-18 DIAGNOSIS — M70.61 GREATER TROCHANTERIC BURSITIS OF RIGHT HIP: Primary | ICD-10-CM

## 2019-06-18 DIAGNOSIS — M16.11 PRIMARY OSTEOARTHRITIS OF RIGHT HIP: ICD-10-CM

## 2019-06-18 DIAGNOSIS — M25.551 HIP PAIN, RIGHT: ICD-10-CM

## 2019-06-18 DIAGNOSIS — G89.4 CHRONIC PAIN SYNDROME: ICD-10-CM

## 2019-06-18 PROCEDURE — 99214 OFFICE O/P EST MOD 30 MIN: CPT | Performed by: NURSE PRACTITIONER

## 2019-06-18 ASSESSMENT — PAIN SCALES - GENERAL: PAINLEVEL: MODERATE PAIN (4)

## 2019-06-18 NOTE — PATIENT INSTRUCTIONS
PLAN  1. Medications:   1. No changes to medication. Primary Care Provider continues to work on getting OxyContin covered for patient's pain, which I am in total agreement with using OxyContin 10mg every 12 hours  2. Procedures: Schedule right greater trochanteric bursal injection with Dr. Georgette Woodruff, our office will contact you with further instructions.  3. Follow-up with me in 6-8 weeks  ----------------------------------------------------------------  Clinic Number:  [phone]   Call this number with any questions about your care and for scheduling assistance. Calls are returned Monday through Friday between 8 AM and 4:30 PM. We usually get back to you within 2 business days depending on the issue/request.       Medication refills:    For non-opioid medications, call your pharmacy directly to request a refill. The pharmacy will contact the [facility] for authorization. Please allow 3-4 days for these refills to be processed.     For opioid refills, call the clinic number or send a [employer] message. Please contact us 7-10 days before your refill is due. The message MUST include the name of the specific medication(s) requested and how you would like to receive the prescription(s). The options are as follows:    Pain Clinic staff can mail the prescription to your pharmacy. Please tell us the name of the pharmacy.    You may pick the prescription up at the Pain Clinic (tell us the location) or during a clinic visit with your pain provider    Pain Clinic staff can deliver the prescription to the [facility] pharmacy in the clinic building. Please tell us the location.      We believe regular attendance is key to your success in our program.    Any time you are unable to keep your appointment we ask that you call us at least 24 hours in advance to let us know. This will allow us to offer the appointment time to another patient.

## 2019-06-18 NOTE — PROGRESS NOTES
6-18-19   CC received email from RN staff at [facility] stating that member had f/u visit with surgeon and that they did not find any CA at this time and that she had her catheter removed.    RN staff stated that member was doing okay and they did not need to make any changed to her CP at this time.     RN staff then asked about f/u appointment which per member was in 4 weeks but her Epic was next week.   CC then reached out to Urology clinic and talked with Ayanna. She reviewed Epic and stated that f/u appointment was set up originally prior to surgery so that is why it was scheduled for 6-27.  She will send note to Dr. Fong to clarify after members f/u visit on 6-17 when Dr. Fong would like to see member again.     She will then have clinic schedule.   CC then said she will f/u in Epic to see when scheduled and let RN staff at [facility] know.     Mirian Weldon MA [facility] Care Coordinator   [phone]

## 2019-06-18 NOTE — PROGRESS NOTES
[facility]    6/18/2019    Chief complaint: low back and right lateral hip pain    Interval history:  Jazmin Clifton is a 86 year old female is known to me for Pain of right hip joint pain  Chrornic bilateral low back pain without sciatica   H/O lumbosacral spine surgery  Lumbar facet joint pain  Greater trochanteric bursitis of both hips  Chronic pain syndrome  Chronic opiate use in the form of oxycodone 25mg/day which is equal to 37mg OME (oral morphine equivalent, also known as morphine milligram equivalent-MME)     .    Recommendations/plan at the last visit on 5/14/2019  included:  1. Physical Therapy: None at present, can schedule appointments with Tracy in the future.  2. Clinical Health Psychologist to address issues of relaxation, behavioral change, coping style, and other factors important to improvement: None at present   3. Diagnostic Studies: None at present   4. Medication Management:   1. Continue Tylenol Extra Strength 500-1000mg TID PRN  2. Continue Cymbalta 60mg in the AM and 30mg in the PM  3. Continue Gabapentin 200mg BID  4. Continue 4% Lidocaine patches re: bilateral hip pain  5. Continue Oxycodone as managed by PCP. I discussed other options for long acting pain medication with the patient's PCP. Options might include OxyContin, Butrans patch, or Duragesic patch.  1. Ideally, since patient is known to tolerate oxycodone, I would consider working to keep her daily opiate dose the same, by using OxyContin 10mg every 12 hours and then allowing oxycodone 5mg tab once per day as needed for breakthrough pain. This would keep her daily oxycodone dose at 25mg/day but may offer the patient markedly improved pain control.   6. Continue tizanidine 2mg take TID PRN muscle spasms  5. Further procedures recommended: None at present   6. Jazmin to follow up with Primary Care provider regarding elevated blood pressure.  7. Schedule with [facility] in [place] with Dr. Ríos or Dr. Stuart re:  right hip pain  8. Acupuncture: None at present   9. Urine toxicology screen today: None at present    10. Recommendations/follow-up for PCP:  See above  11. Release of information: None at present  12. Follow up: 4-6 weeks        Since her last visit, Jazmin Clifton reports:  -Removal of urethral mass and catheter placement was helpful and the patient does not have cancer. There are now only trace amounts of blood in urine and can urinate normally.   -Worst pain starts in the late afternoon and worsens at night. Pain is located over the low back and right lateral hip.  -the hip joint injection on 5/24/2019 was very helpful for 10 days      At this point, the patient's participation with our multidisciplinary team includes:  The patient has been compliant with the program.  PT - none  Health Psych - none      Pain scores:  Pain intensity on average is 4 on a scale of 0-10.    Range is 3-8/10.   Pain is described as aching.    Pain is constant in nature    Current pain-related medication treatments include:  -Tylenol Extra Strength 500mg may take 2 tabs TID (somewhat helpful)  -ASA 81mg every day  -Cymbalta 30mg capsules, take 60mg in the AM and 30mg in the PM for daily dose of 90mg/day (somewhat helpful)  -gabapentin 200mg BID (Unsure)  -4% lidocaine patches PRN (helpful)  -oxycodone 5mg take TID and 2 tablets/day PRN max of 5 tablets/day ( 5 tablets/day helpful)  -tizanidine 2mg TID PRN (2 tablets/day helpful)     Other pertinent medications:  -prednisone 20mg for 3 days from 5/13-5/16/2019 (helpful for COPD)  -Senna-docusate PRN      Previous medication treatments included:  OPIATES: Oxycodone (helpful), Norco (not helpful), Morphine (allergy)  NSAIDS: Aleve (helpful)  MUSCLE RELAXANTS: Tizanidine (helpful),   ANTI-MIGRAINE MEDS: None  ANTI-DEPRESSANTS: Cymbalta (helpful),   SLEEP AIDS: None  ANTI-CONVULSANTS: gabapentin (unsure),   TOPICALS: Lidocaine 4% patches (helpful),   Other meds: Tylenol (not helpful),  Prednisone         Other treatments have included:  Jazmin STAHL Etienne has been seen at a pain clinic in the past.    PT: Tried it, not helpful (rehab PT)  Chiropractic care: Tried it, made it worse  Acupuncture: None  TENs Unit: None     Injections: 5/24/2019 Right hip joint injection with Dr. Valentin Woodruff (very helpful for 10 days)     Surgeries: Tried it, helped. Previous surgery was not helpful.  6/10/2019 Cystoscopy with biopsy of urethral lesion.     THE 4 A's OF OPIOID MAINTENANCE ANALGESIA    Analgesia: helpful    Activity: very little activity, essentially wheelchair bound    Adverse effects: none    Adherence to Rx protocol: yes      Side Effects: no side effect  Patient is using the medication as prescribed: YES    Medications:  Current Outpatient Medications   Medication Sig Dispense Refill     acetaminophen (TYLENOL) 325 MG tablet Take 2 tablets (650 mg) by mouth every 4 hours as needed for mild pain 60 tablet 0     ASPIRIN LOW DOSE 81 MG chewable tablet CHEW AND SWALLOW ONE TABLET BY MOUTH ONCE DAILY 30 tablet 11     atorvastatin (LIPITOR) 40 MG tablet TAKE 1 TABLET BY MOUTH ONCE DAILY 30 tablet 98     Blood Glucose Monitoring Suppl CORY 2 times daily Call nurse for further directions if blood glucose is less than 80 or greater than 250       BREO ELLIPTA 100-25 MCG/INH inhaler INHALE 1 PUFF BY MOUTH ONCE DAILY 1 Inhaler 11     calcium carbonate (TUMS) 500 MG chewable tablet Take 1 chew tab by mouth every hour as needed for heartburn       DONEPEZIL HCL PO Take 5 mg by mouth daily       DULoxetine (CYMBALTA) 30 MG capsule Take 2 capsules (60 mg) by mouth daily AND 1 capsule (30 mg) At Bedtime. 90 capsule 11     ferrous sulfate (FEROSUL) 325 (65 Fe) MG tablet Take 1 tablet (325 mg) by mouth daily (with breakfast) 60 tablet 98     fluticasone (FLONASE) 50 MCG/ACT nasal spray Spray 1 spray into both nostrils 2 times daily       furosemide (LASIX) 40 MG tablet Take 1 tablet (40 mg) by mouth 2 times daily 60  tablet 11     gabapentin (NEURONTIN) 100 MG capsule Take 2 capsules (200 mg) by mouth 2 times daily 120 capsule 11     hypromellose (ARTIFICIAL TEARS) 0.5 % SOLN ophthalmic solution Place 1 drop into both eyes every 6 hours as needed for dry eyes       isradipine (DYNACIRC) 5 MG capsule Take 5 mg by mouth 2 times daily       levalbuterol (XOPENEX) 1.25 MG/3ML neb solution Take 1 ampule by nebulization every 4 hours as needed for shortness of breath / dyspnea or wheezing And every 4 hours PRN       Lidocaine (LIDOCARE) 4 % Patch Place 1 patch onto the skin daily as needed To desired area (shoulder or hip). On for 12hrs, off for 12hrs       lisinopril (PRINIVIL/ZESTRIL) 20 MG tablet TAKE 1 TABLET BY MOUTH ONCE DAILY 30 tablet 98     MAGNESIUM OXIDE PO Take 250 mg by mouth daily       Menthol, Topical Analgesic, (BIOFREEZE) 4 % GEL Externally apply topically 4 times daily as needed To left shoulder and right hip       nystatin (MYCOSTATIN) 648391 UNIT/GM external powder APPLY TOPICALLY TO ABDOMEN FOLDS, GROIN, AND BREASTS TWICE DAILY AS NEEDED 60 g 97     OMEPRAZOLE PO Take 40 mg by mouth every morning       oxyCODONE (OXYCONTIN) 10 MG 12 hr tablet Take 1 tablet (10 mg) by mouth every 12 hours maximum 2 tablet(s) per day 60 tablet 0     oxyCODONE (ROXICODONE) 5 MG tablet Take 1 tablet (5 mg) by mouth 3 times daily. May also take 1 tablet (5 mg) 2 times daily as needed for severe pain. Continue until OxyContin ER available, then stop 60 tablet 0     phenazopyridine (PYRIDIUM) 100 MG tablet Take 1 tablet (100 mg) by mouth 3 times daily as needed for urinary tract discomfort 9 tablet 1     senna-docusate (SENOKOT-S/PERICOLACE) 8.6-50 MG tablet Take 1 tablet by mouth 2 times daily as needed        tiZANidine (ZANAFLEX) 2 MG tablet TAKE 1 TABLET BY MOUTH THREE TIMES DAILY AS NEEDED 90 tablet 97     vitamin C (ASCORBIC ACID) 500 MG tablet TAKE 1 TAB BY MOUTH ONCE DAILY FOR SUPPLEMENT  0     vitamin D3 (CHOLECALCIFEROL) 1000  units (25 mcg) tablet Take 1 tablet (1,000 Units) by mouth daily 60 tablet 11       Medical History: any changes in medical history since they were last seen? No    Social History:   Home situation: , has six adult children, and lives in residential facility   Occupation/Schooling: Homemaker   Tobacco use: Quit many years ago.  Alcohol use: None  Drug use: None  History of chemical dependency treatment: None      Review of Systems:  ROS is positive as per HPI as well as for  and is negative for fevers, chills, sweats, or constipation, diarrhea.    This document serves as a record of the services and decisions personally performed and made by Mirian HERNANDEZ. It was created on her behalf by Robert Mccabe, a trained medical scribe. The creation of this document is based on the provider's statements to the medical scribe.  Robert Mccabe 2:27 PM June 18, 2019      Physical Exam:  Vital signs: Blood pressure 163/85, pulse 74.    Behavioral observations:  Awake, alert, and cooperative    Gait:  Not ambulatory. She uses an electric wheelchair to get around    Musculoskeletal exam:    Moves slowly in wheelchair, strength grossly equal throughout, and she has tenderness over the right greater trochanter.   SI joints are non tender and piriformis are non tender.    Neuro exam:  SILT in all extremities     Skin/vascular/autonomic:  No suspicious lesions on exposed skin.      Other:  None      [place] Prescription Monitoring Program:  Reviewed [facility] June 26, 2019- no concerning fills. Managed by PCP  Mirian HERNANDEZ RN CNP, FNP  [facility]        DIRE Score for selecting candidates for long term opioid analgesia for chronic pain:  Diagnosis  2  Intractablility  1-2  Risk    Psychological health  2    Chemical health  2    Reliability  2    Social support  1-2  Efficacy  2    Total DIRE Score = 12-14. Note that   7-13 predicts poor outcome (compliance and efficacy) from opioid  prescribing; 14-21 predicts good outcome (compliance and efficacy)  from opioid prescribing.      Assessment:   1. Greater trochanteric bursitis of right hip  2. Primary osteoarthritis of right hip  3. Hip pain, right  4. H/O lumbosacral spine surgery  5. Chronic pain syndrome  6. Chronic opiate use in the form of oxycodone 25mg/day which is equal to 37mg OME (oral morphine equivalent, also known as morphine milligram equivalent-MME)   7. PMHx includes: Benign HTN 5/1/2006 Abdominal pain, generalized 3/15/2006. Abdominal pain generalized 3/15/2006. Atherosclerosis of renal artery. 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 Cerebral aneurysm, nonruptured. Depressive disorder, not elsewhere classified. GERD. Female stress incontinence. Gastrointestinal malfunction arising from mental factors. Generalized osteoarthrosis, unspecified site. Herpes zoster dermatitis of eyelid. Insomnia, unspecified. Lumbago. Other chest pain. Other specified cardiac dysrhythmias. Rectocele. Unspecified disorder of kidney and ureter. Unspecified essential HTN.  8. PSHx includes: Endarterectomy carotid right 8/31/2017. Cholecystectomy, laparoscopic 1997. Surgical history of bladder suspension 1996. Hysterectomy 1982. Surgical history of laminectomy x3. MCA aneurysm repair. Bilateral hand surgeries for arthritis. Repair of a cerebral aneurysm.           Plan:  1. Physical Therapy:  None at present  2. Clinical Health Psychologist: None at present   3. Diagnostic Studies:  None at present  4. Medication Management:    1. No changes to medication.  5. Further procedures recommended: Patient to schedule a right greater trochanteric bursal injection with Dr. Georgette Woodruff, office to contact patient.  6. Recommendations to PCP: See above  7. Follow up: 6-8 weeks    Total time spent face to face was 25 minutes and more than 50% of face to face time was spent in counseling and/or coordination of care regarding the diagnosis and recommendations  above.    The information in this document, created by the medical scribe for me, accurately reflects the services I personally performed and the decisions made by me. I have reviewed and approved this document for accuracy prior to leaving the patient care area.  June 18, 2019     Mirian HERNANDEZ, RN CNP, FNP  [facility] [place]

## 2019-06-19 RX ORDER — OXYCODONE HYDROCHLORIDE 5 MG/1
TABLET ORAL
Qty: 90 TABLET | Refills: 0 | Status: SHIPPED | OUTPATIENT
Start: 2019-06-19 | End: 2019-07-02

## 2019-06-27 ENCOUNTER — OFFICE VISIT (OUTPATIENT)
Dept: PALLIATIVE MEDICINE | Facility: CLINIC | Age: 84
End: 2019-06-27
Payer: COMMERCIAL

## 2019-06-27 VITALS — DIASTOLIC BLOOD PRESSURE: 79 MMHG | HEART RATE: 73 BPM | SYSTOLIC BLOOD PRESSURE: 155 MMHG

## 2019-06-27 DIAGNOSIS — M25.551 CHRONIC RIGHT HIP PAIN: ICD-10-CM

## 2019-06-27 DIAGNOSIS — M70.61 TROCHANTERIC BURSITIS OF RIGHT HIP: Primary | ICD-10-CM

## 2019-06-27 DIAGNOSIS — G89.29 CHRONIC RIGHT HIP PAIN: ICD-10-CM

## 2019-06-27 PROCEDURE — 20610 DRAIN/INJ JOINT/BURSA W/O US: CPT | Performed by: ANESTHESIOLOGY

## 2019-06-27 ASSESSMENT — PAIN SCALES - GENERAL: PAINLEVEL: MILD PAIN (3)

## 2019-06-27 NOTE — PROGRESS NOTES
Pre procedure Diagnosis: right hip pain, right hip greater trochanteric bursitis   Post procedure Diagnosis: Same  Procedure performed: right hip greater trochanteric bursa injection  Indication: Therapeutic for pain  Anesthesia: none  Complications: none  Operators: Farhat Woodruff MD      Indications:   Jazmin Clifton is a 86 year old year old female was sent by MARY Padilla for treatment of chronic pain. The patient has a history of chronic right hip pain. Exam shows tenderness to palpation at the right greater trochanter. Other conservative treatments prior to injection include physical therapy, medications, and prior injections.     Options/alternatives, benefits and risks were discussed with the patient including bleeding, infection, tissue trauma, exposure to radiation, reaction to medications including seizure, nerve injury, weakness, and numbness. Questions were answered to their satisfaction and they agreed to proceed. Voluntary informed consent was obtained and signed.      Vitals were reviewed: Yes  /79   Pulse 73   Allergies were reviewed: Yes   Medications were reviewed: Yes   Pre-procedure pain score: 8/10     Procedure:  After obtaining signed informed consent, the patient was positioned in a left lateral decubitus position.  A Pause for the Cause was performed.      After identifying the point of maximal tenderness at the right greater trochanter, the area was prepped in the usual sterile fashion. Then, a 27 gauge 3.5 inch spinal needle was advanced to contact bone at the right greater trochanter with positive pain reproduction and withdrawn 1-2 mm. Aspiration was negative. Then, 5 ml of a combination of Kenalog 40 mg and Bupivicaine 0.5% 4 ml was injected into the bursa and the needle was withdrawn. The injection site was cleaned and a bandaid was placed.    Kenalog lot # [id number], exp 02/2021        The patient tolerated the procedure well, and was taken to the recovery room.       Post-procedure pain score: 0/10  Follow-up includes:   -f/u phone call in one week  -f/u with referring provider     Farhat Woodruff MD  [facility] [place]

## 2019-06-27 NOTE — PATIENT INSTRUCTIONS
[facility]   Post Procedure Instructions    Today you had:  bursa injection      Medications used: bupivicaine   kenolog          Go to the emergency room if you develop any shortness of breath    Monitor the injection sites for signs and symptoms of infection-fever, chills, redness, swelling, warmth, or drainage to areas.    You may have soreness at injection sites for up to 24 hours.    You may apply ice to the painful areas to help minimize the discomfort of the needle pokes.    Do not apply heat to sites for at least 12 hours.    You may use anti-inflammatory medications or Tylenol for pain control if necessary    Pain Clinic phone number during work hours Monday-Friday:  [phone]    After hours provider line: [phone]

## 2019-07-01 NOTE — PROGRESS NOTES
[facility] Care Coordination Contact    No Letter Received: 60 day tracking of letter complete, no letter received from Yessy on [employer]. Tracking discontinued.    Amira Carrizales  Case Management Specialist   [facility]   [phone]

## 2019-07-10 ENCOUNTER — PRE VISIT (OUTPATIENT)
Dept: UROLOGY | Facility: CLINIC | Age: 84
End: 2019-07-10

## 2019-07-10 NOTE — TELEPHONE ENCOUNTER
Reason for visit: one month post op appointment     Relevant information: urethral reconstruction    Records/imaging/labs/orders: all records available    Pt called: no need for a call    At Rooming: Undress for exam

## 2019-07-15 ENCOUNTER — PATIENT OUTREACH (OUTPATIENT)
Dept: GERIATRIC MEDICINE | Facility: CLINIC | Age: 84
End: 2019-07-15

## 2019-07-15 NOTE — PROGRESS NOTES
[facility] Care Coordination Contact    I got a call from member on 7/12/19 about wanting pads/liners with more absorbency. Member just received monthly supply a week ago and so she is going to double up on liners until next shipment comes. Maverick at [facility] updated her monthly order to max absorbency pads for her next shipment in August. CC notified.     Arlen Lang  Care Management Specialist  [facility]  [phone]

## 2019-07-18 ENCOUNTER — OFFICE VISIT (OUTPATIENT)
Dept: UROLOGY | Facility: CLINIC | Age: 84
End: 2019-07-18
Payer: COMMERCIAL

## 2019-07-18 VITALS
HEIGHT: 63 IN | BODY MASS INDEX: 34.38 KG/M2 | WEIGHT: 194 LBS | HEART RATE: 69 BPM | SYSTOLIC BLOOD PRESSURE: 134 MMHG | DIASTOLIC BLOOD PRESSURE: 72 MMHG

## 2019-07-18 DIAGNOSIS — R32 URINARY INCONTINENCE, UNSPECIFIED TYPE: ICD-10-CM

## 2019-07-18 DIAGNOSIS — R35.0 URINARY FREQUENCY: ICD-10-CM

## 2019-07-18 DIAGNOSIS — N36.8 MASS OF URETHRA: Primary | ICD-10-CM

## 2019-07-18 DIAGNOSIS — R30.0 DYSURIA: ICD-10-CM

## 2019-07-18 LAB
ALBUMIN UR-MCNC: NEGATIVE MG/DL
APPEARANCE UR: CLEAR
BILIRUB UR QL STRIP: NEGATIVE
COLOR UR AUTO: YELLOW
GLUCOSE UR STRIP-MCNC: NEGATIVE MG/DL
HGB UR QL STRIP: NEGATIVE
HYALINE CASTS #/AREA URNS LPF: 6 /LPF (ref 0–2)
KETONES UR STRIP-MCNC: NEGATIVE MG/DL
LEUKOCYTE ESTERASE UR QL STRIP: ABNORMAL
MUCOUS THREADS #/AREA URNS LPF: PRESENT /LPF
NITRATE UR QL: NEGATIVE
PH UR STRIP: 5 PH (ref 5–7)
RBC #/AREA URNS AUTO: <1 /HPF (ref 0–2)
SOURCE: ABNORMAL
SP GR UR STRIP: 1.01 (ref 1–1.03)
UROBILINOGEN UR STRIP-MCNC: 0 MG/DL (ref 0–2)
WBC #/AREA URNS AUTO: 30 /HPF (ref 0–5)

## 2019-07-18 PROCEDURE — 87088 URINE BACTERIA CULTURE: CPT | Performed by: UROLOGY

## 2019-07-18 PROCEDURE — 87086 URINE CULTURE/COLONY COUNT: CPT | Performed by: UROLOGY

## 2019-07-18 PROCEDURE — 87186 SC STD MICRODIL/AGAR DIL: CPT | Performed by: UROLOGY

## 2019-07-18 ASSESSMENT — MIFFLIN-ST. JEOR: SCORE: 1289.11

## 2019-07-18 ASSESSMENT — PAIN SCALES - GENERAL: PAINLEVEL: MILD PAIN (3)

## 2019-07-18 NOTE — NURSING NOTE
"Chief Complaint   Patient presents with     Follow Up     One month post-op       Blood pressure 134/72, pulse 69, height 1.6 m (5' 3\"), weight 88 kg (194 lb). Body mass index is 34.37 kg/m .    Patient Active Problem List   Diagnosis     Atherosclerosis of renal artery (H)     Esophageal reflux     Cerebral aneurysm, nonruptured     Other specified cardiac dysrhythmias(427.89)     Essential hypertension, benign     Congenital cystic kidney disease     Benign neoplasm of colon     Renovascular hypertension     CHF (congestive heart failure) (H)     Restrictive lung disease     CARDIOVASCULAR SCREENING; LDL GOAL LESS THAN 100     Osteoporosis     S/P laminectomy     Hip joint replacement status     Osteoarthritis     DDD (degenerative disc disease), lumbar     Mild major depression (H)     Incontinence of urine     Chronic obstructive pulmonary disease, unspecified COPD type (H)     Generalized muscle weakness     Dizziness     Osteoarthritis of right hip, unspecified osteoarthritis type     CVA (cerebral vascular accident) (H)     Transient cerebral ischemia, unspecified type     CKD (chronic kidney disease) stage 3, GFR 30-59 ml/min (H)     Thyroid nodule     Trigger point of extremity     Trochanteric bursitis of right hip     Carotid stenosis, symptomatic w/o infarct, right     S/P carotid endarterectomy     Mild cognitive impairment     Morbid obesity (H)     Health Care Home     Shortness of breath     COPD exacerbation (H)     Weakness     Acute kidney injury (H)     Alzheimer's dementia without behavioral disturbance     Anaclitic depression     BPPV (benign paroxysmal positional vertigo)     Chronic diastolic congestive heart failure (H)     Constipation     Dyspepsia     Female stress incontinence     Hip pain     History of bradycardia     History of DVT (deep vein thrombosis)     History of herpes zoster     History of intracranial aneurysm     History of stroke     Hyperlipidemia     Impaired ambulation "     Multiple closed fractures of metatarsal bone     Normocytic anemia     Postherpetic neuralgia     Rectocele     Retention of urine     Right groin mass     Spinal stenosis of lumbar region     Spondylolisthesis, lumbar region     Umbilical hernia without obstruction and without gangrene     Anemia of chronic renal failure, stage 4 (severe) (H)     Mass of urethra     Gross hematuria     Post-operative state       Allergies   Allergen Reactions     Accupril      Ace Inhibitors Unknown     Accupril     Augmentin Unknown     Blood-Group Specific Substance      Other reaction(s): *Unknown  Patient has a Non-specific antibody. Blood Product orders may be delayed.  Draw one red top and two purple top tubes for ALL Type and Screen/ Type and Crossmatch orders.      Levofloxacin Unknown, Muscle Pain (Myalgia) and Other (See Comments)     Comment: myalgias, Description:   Pt prescribed ciprofloxacin in Jan 2018 (no rxn documented)  Myalgias       Morphine Other (See Comments)     hallucinations     Nitrofurantoin Nausea and Vomiting and Unknown     Norvasc [Amlodipine Besylate]      Leg swelling       Quinapril Other (See Comments) and Unknown     Hypertension. 3.29.18 - Takes and tolerates lisinopril  Patient reports HTN with as reaction to this medication         Current Outpatient Medications   Medication Sig Dispense Refill     acetaminophen (TYLENOL) 325 MG tablet Take 2 tablets (650 mg) by mouth every 4 hours as needed for mild pain 60 tablet 0     ASPIRIN LOW DOSE 81 MG chewable tablet CHEW AND SWALLOW ONE TABLET BY MOUTH ONCE DAILY 30 tablet 11     atorvastatin (LIPITOR) 40 MG tablet TAKE 1 TABLET BY MOUTH ONCE DAILY 30 tablet 98     Blood Glucose Monitoring Suppl CORY 2 times daily Call nurse for further directions if blood glucose is less than 80 or greater than 250       BREO ELLIPTA 100-25 MCG/INH inhaler INHALE 1 PUFF BY MOUTH ONCE DAILY 1 Inhaler 11     calcium carbonate (TUMS) 500 MG chewable tablet Take 1  chew tab by mouth every hour as needed for heartburn       DONEPEZIL HCL PO Take 5 mg by mouth daily       DULoxetine (CYMBALTA) 30 MG capsule Take 2 capsules (60 mg) by mouth daily AND 1 capsule (30 mg) At Bedtime. 90 capsule 11     ferrous sulfate (FEROSUL) 325 (65 Fe) MG tablet Take 1 tablet (325 mg) by mouth daily (with breakfast) 60 tablet 98     fluticasone (FLONASE) 50 MCG/ACT nasal spray Spray 1 spray into both nostrils 2 times daily       furosemide (LASIX) 40 MG tablet Take 1 tablet (40 mg) by mouth 2 times daily 60 tablet 11     gabapentin (NEURONTIN) 100 MG capsule Take 2 capsules (200 mg) by mouth 2 times daily 120 capsule 11     hypromellose (ARTIFICIAL TEARS) 0.5 % SOLN ophthalmic solution Place 1 drop into both eyes every 6 hours as needed for dry eyes       isradipine (DYNACIRC) 5 MG capsule Take 5 mg by mouth 2 times daily       levalbuterol (XOPENEX) 1.25 MG/3ML neb solution Take 1 ampule by nebulization every 4 hours as needed for shortness of breath / dyspnea or wheezing And every 4 hours PRN       Lidocaine (LIDOCARE) 4 % Patch Place 1 patch onto the skin daily as needed To desired area (shoulder or hip). On for 12hrs, off for 12hrs       lisinopril (PRINIVIL/ZESTRIL) 20 MG tablet TAKE 1 TABLET BY MOUTH ONCE DAILY 30 tablet 98     MAGNESIUM OXIDE PO Take 250 mg by mouth daily       Menthol, Topical Analgesic, (BIOFREEZE) 4 % GEL Externally apply topically 4 times daily as needed To left shoulder and right hip       nystatin (MYCOSTATIN) 207321 UNIT/GM external powder APPLY TOPICALLY TO ABDOMEN FOLDS, GROIN, AND BREASTS TWICE DAILY AS NEEDED 60 g 97     OMEPRAZOLE PO Take 40 mg by mouth every morning       oxyCODONE (ROXICODONE) 5 MG tablet TAKE 1 TABLET BY MOUTH THREE TIMES DAILY;AND MAY TAKE 1 TABLET BY MOUTH TWICE DAILY AS NEEDED FOR SEVERE PAIN 120 tablet 0     oxyCODONE (ROXICODONE) 5 MG tablet Take 1 tablet (5 mg) by mouth 3 times daily. May also take 1 tablet (5 mg) 2 times daily as  needed for severe pain. Continue until OxyContin ER available, then stop 60 tablet 0     phenazopyridine (PYRIDIUM) 100 MG tablet Take 1 tablet (100 mg) by mouth 3 times daily as needed for urinary tract discomfort 9 tablet 1     senna-docusate (SENOKOT-S/PERICOLACE) 8.6-50 MG tablet Take 1 tablet by mouth 2 times daily as needed        tiZANidine (ZANAFLEX) 2 MG tablet TAKE 1 TABLET BY MOUTH THREE TIMES DAILY AS NEEDED 90 tablet 97     vitamin C (ASCORBIC ACID) 500 MG tablet TAKE 1 TAB BY MOUTH ONCE DAILY FOR SUPPLEMENT  0     vitamin D3 (CHOLECALCIFEROL) 1000 units (25 mcg) tablet Take 1 tablet (1,000 Units) by mouth daily 60 tablet 11       Social History     Tobacco Use     Smoking status: Former Smoker     Last attempt to quit: 1992     Years since quittin.5     Smokeless tobacco: Never Used   Substance Use Topics     Alcohol use: Yes     Comment: wine occas.     Drug use: No       PIPER Mcmillan  2019  12:46 PM

## 2019-07-18 NOTE — LETTER
"7/18/2019       RE: Jazmin Krishnamurthy [address]  [address]  [facility] [address]     Dear Colleague,    Thank you for referring your patient, Jazmin Clifton, to the [facility] UROLOGY AND INST FOR PROSTATE AND UROLOGIC CANCERS at [facility]. Please see a copy of my visit note below.    July 18, 2019    Return visit    Patient returns today for follow up following excision of urethral lesion and bladder lesion biopsy and fulguration which was performed on 6/10/19. The results of her pathology were benign as detailed below. Today she reports bothersome tremors (which she has baseline however she states they have been acutely worse in the post-operative period). The patient also reports increased urinary frequency post-operatively with some dysuria. Her last UCx was obtained pre-operatively but only noted < 10K mixed  weston. She has not had any recent UA/UCx.    Of note patient reports past history of surgery on her lymphatics which seems to correspond to what seen on MRI    /72   Pulse 69   Ht 1.6 m (5' 3\")   Wt 88 kg (194 lb)   BMI 34.37 kg/m     She is comfortable, in no distress, non-labored breathing.  Abdomen is soft, non-tender, non-distended.  Normal external female genitalia.  Negative CST. On exam urethra appears well healed, no gross abnormalities noted.     PVR ~ 98 mL by bladder scan, emptied bladder for ~ 340 ml via catheterization      A/P: 86 year old F with history of a urethral and bladder lesion who is s/p resection and biopsy and fulguration of these lesions respectively. Post-operatively she reports bothersome dysuria, intermittency and frequency.     - UA/UCx obtained given her bothersome symptoms  - Will follow culture and treat if indicated   - Will arrange follow up in one month, sooner if needed    Addendum:    The patient was seen and evaluated with the resident.  The plan was formulated in conjunction with me and I agree with the " above note with changes made as necessary.    25 minutes were spent with patient today, >50% in counseling and coordination of care    Yesy Fong MD MPH   of Urology    CC  Patient Care Team:  Junie Estrella APRN CNP as PCP - General (Nurse Practitioner - Gerontology)  Toño Shafer MD as MD (Family Practice)  Aliyah Milan as Medical Assistant (Gerontology)  Sarah Mccabe as Case Management Specialist (Primary Care - CC)  Laura George as Case Management Specialist (Primary Care - CC)  Mirian Weldon LSW as Lead Care Coordinator (Primary Care - CC)  Uma Webber [facility] as Pharmacist (Pharmacist)  Lincoln Brady MD as MD (Family Practice)  Qian Gutierrez MD as MD (OB/Gyn)  Junie Estrella APRN CNP as Referring Physician (Nurse Practitioner - Gerontology)  Tara Silva, RN as Specialty Care Coordinator (Urology)

## 2019-07-18 NOTE — PROGRESS NOTES
"July 18, 2019    Return visit    Patient returns today for follow up following excision of urethral lesion and bladder lesion biopsy and fulguration which was performed on 6/10/19. The results of her pathology were benign as detailed below. Today she reports bothersome tremors (which she has baseline however she states they have been acutely worse in the post-operative period). The patient also reports increased urinary frequency post-operatively with some dysuria. Her last UCx was obtained pre-operatively but only noted < 10K mixed  weston. She has not had any recent UA/UCx.    Of note patient reports past history of surgery on her lymphatics which seems to correspond to what seen on MRI    /72   Pulse 69   Ht 1.6 m (5' 3\")   Wt 88 kg (194 lb)   BMI 34.37 kg/m    She is comfortable, in no distress, non-labored breathing.  Abdomen is soft, non-tender, non-distended.  Normal external female genitalia.  Negative CST. On exam urethra appears well healed, no gross abnormalities noted.     PVR ~ 98 mL by bladder scan, emptied bladder for ~ 340 ml via catheterization      A/P: 86 year old F with history of a urethral and bladder lesion who is s/p resection and biopsy and fulguration of these lesions respectively. Post-operatively she reports bothersome dysuria, intermittency and frequency.     - UA/UCx obtained given her bothersome symptoms  - Will follow culture and treat if indicated   - Will arrange follow up in one month, sooner if needed    Addendum:    The patient was seen and evaluated with the resident.  The plan was formulated in conjunction with me and I agree with the above note with changes made as necessary.    25 minutes were spent with patient today, >50% in counseling and coordination of care    Yesy Fong MD MPH   of Urology    CC  Patient Care Team:  Junie Estrella APRN CNP as PCP - General (Nurse Practitioner - Gerontology)  Junie Estrella APRN " CNP as Assigned PCP  Toño Shafer MD as MD (Family Practice)  Aliyah Milan as Medical Assistant (Gerontology)  Sarah Mccabe as Case Management Specialist (Primary Care - CC)  Laura George as Case Management Specialist (Primary Care - CC)  Mirian Weldon LSW as Lead Care Coordinator (Primary Care - CC)  Uma Wbeber [facility] as Pharmacist (Pharmacist)  Lincoln Brady MD as MD (Family Practice)  Qian Gutierrez MD as MD (OB/Gyn)  Yesy Fong MD as MD (Urology)  Maranda Bautista APRN CNP as Referring Physician (Nurse Practitioner - Gerontology)  Tara Silva, RN as Specialty Care Coordinator (Urology)  MARANDA BAUTISTA

## 2019-07-18 NOTE — PATIENT INSTRUCTIONS
See your primary care provider or your surgeon about the swelling in your groin area where you had prior surgery    Please do a catheterized post void residual once a day prior to bedtime to see how well the bladder is emptying.  Record these numbers    Please return in about one month, sooner if needed    It was a pleasure meeting with you today.  Thank you for allowing me and my team the privilege of caring for you today.  YOU are the reason we are here, and I truly hope we provided you with the excellent service you deserve.  Please let us know if there is anything else we can do for you so that we can be sure you are leaving completely satisfied with your care experience.

## 2019-07-19 ENCOUNTER — TELEPHONE (OUTPATIENT)
Dept: UROLOGY | Facility: CLINIC | Age: 84
End: 2019-07-19

## 2019-07-19 NOTE — TELEPHONE ENCOUNTER
Deidre from the patient's assisted living facility was notified that the patient can follow up as scheduled.      Michele Benoit MA

## 2019-07-19 NOTE — TELEPHONE ENCOUNTER
Health Call Center    Phone Message    May a detailed message be left on voicemail: yes    Reason for Call: Order(s): Post Void Residual at bed time  Order issued 7.18.19 during clinic visit    Question/Concern: Facility is unable to complete order - the facility is not a skilled nursing facility.  What would Dr. Fong advise next.   Date needed: ASAP  Provider name: Dr. Fong new orders may be faxed [phone]      Action Taken: Message routed to:  [facility] urology

## 2019-07-19 NOTE — TELEPHONE ENCOUNTER
Deidre Soto Dr. from the patient's assisting living facility states that they not provide that service since they are an assisted living facility.     Michele Benoit MA

## 2019-07-21 LAB
BACTERIA SPEC CULT: ABNORMAL
Lab: ABNORMAL
SPECIMEN SOURCE: ABNORMAL

## 2019-07-22 DIAGNOSIS — M48.061 SPINAL STENOSIS OF LUMBAR REGION, UNSPECIFIED WHETHER NEUROGENIC CLAUDICATION PRESENT: ICD-10-CM

## 2019-07-22 DIAGNOSIS — M43.16 SPONDYLOLISTHESIS, LUMBAR REGION: ICD-10-CM

## 2019-07-22 DIAGNOSIS — G89.4 CHRONIC PAIN SYNDROME: Primary | ICD-10-CM

## 2019-07-23 ENCOUNTER — TELEPHONE (OUTPATIENT)
Dept: UROLOGY | Facility: CLINIC | Age: 84
End: 2019-07-23

## 2019-07-23 ENCOUNTER — TELEPHONE (OUTPATIENT)
Dept: PALLIATIVE MEDICINE | Facility: CLINIC | Age: 84
End: 2019-07-23

## 2019-07-23 DIAGNOSIS — N39.0 URINARY TRACT INFECTION: Primary | ICD-10-CM

## 2019-07-23 DIAGNOSIS — I50.9 CONGESTIVE HEART FAILURE, UNSPECIFIED HF CHRONICITY, UNSPECIFIED HEART FAILURE TYPE (H): ICD-10-CM

## 2019-07-23 RX ORDER — DOXYCYCLINE HYCLATE 100 MG
100 TABLET ORAL 2 TIMES DAILY
Qty: 14 TABLET | Refills: 0 | Status: ON HOLD | OUTPATIENT
Start: 2019-07-23 | End: 2019-09-03

## 2019-07-23 NOTE — TELEPHONE ENCOUNTER
Pre-screening Questions for Radiology Injections:    Injection to be done at which interventional clinic site? [facility]    Instruct patient to arrive as directed prior to the scheduled appointment time:    [place] minutes before      [place]: 30 minutes before; if IV needed 1 hour before     Procedure ordered by Deolres    Procedure ordered? LESI        Transforaminal Cervical HENRIQUE - Dr. Georgette Woodruff ONLY    What insurance would patient like us to bill for this procedure? Medicare/[employer]      Worker's comp or MVA (motor vehicle accident) -Any injection DO NOT SCHEDULE and route to Jimena Yony.      [employer] insurance - For SI joint injections, DO NOT SCHEDULE and route Jimena Yony.       [employer] - Any injection besides hip/shoulder/knee joint DO NOT SCHEDULE and route to Jimena Yony. She will obtain PA and call pt back to schedule procedure or notify pt of denial.       [employer]-Route to Jimena for review      IF SCHEDULING IN [place] AND NEEDS A PA, IT IS OKAY TO SCHEDULE. [place] HANDLES THEIR OWN PA'S AFTER THE PATIENT IS SCHEDULED. PLEASE SCHEDULE AT LEAST 1 WEEK OUT SO A PA CAN BE OBTAINED.      Any chance of pregnancy? NO   If YES, do NOT schedule and route to RN pool    Is an  needed? No     Patient has a drive home? (mandatory) YES: ok    Is patient taking any blood thinners (plavix, coumadin, jantoven, warfarin, heparin, pradaxa or dabigatran )? No   If hold needed, do NOT schedule, route to RN pool     Is patient taking any aspirin products (includes Excedrin and Fiorinal)? Yes - Pt takes 81mg daily; instructed to hold 0 day(s) prior to procedure.      If more than 325mg/day do NOT schedule; route to RN pool     For CERVICAL procedures, hold all aspirin products for 6 days.     Tell pt that if aspirin product is not held for 6 days, the procedure WILL BE cancelled.      Does the patient have a bleeding or clotting disorder? No     If YES, okay to schedule AND route to RN  nurse pool    For any patients with platelet count <100, must be forwarded to provider    Is patient diabetic?  No  If YES, have them bring their glucometer.    Does patient have an active infection or treated for one within the past week? No     Is patient currently taking any antibiotics?  No     For patients on chronic, preventative, or prophylactic antibiotics, procedures may be scheduled.     For patients on antibiotics for active or recent infection:antibiotic course must have been completed for 4 days    Is patient currently taking any steroid medications? (i.e. Prednisone, Medrol)  No     For patients on steroid medications, course must have been completed for 4 days    Reviewed with patient:  If you are started on any steroids or antibiotics between now and your appointment, you must contact us because the procedure may need to be cancelled.  Yes    Is patient actively being treated for cancer or immunocompromised? No  If YES, do NOT schedule and route to RN pool     Are you able to get on and off an exam table with minimal or no assistance? Yes  If NO, do NOT schedule and route to RN pool    Are you able to roll over and lay on your stomach with minimal or no assistance? Yes  If NO, do NOT schedule and route to RN pool     Any allergies to contrast dye, iodine, shellfish, or numbing and steroid medications? No  If YES, route to RN pool AND add allergy information to appointment notes    Allergies: Accupril; Ace inhibitors; Augmentin; Blood-group specific substance; Levofloxacin; Morphine; Nitrofurantoin; Norvasc [amlodipine besylate]; and Quinapril      Has the patient had a flu shot or any other vaccinations within 7 days before or after the procedure.  No     Does patient have an MRI/CT?  YES: MRI  (SI joint, hip injections, lumbar sympathetic blocks, and stellate ganglion blocks do not require an MRI)    Was the MRI done w/in the last 3 years?  Yes    Was MRI done at [place]? No      If not, where was  it done? [facility] 12/18/[facility]       If MRI was not done at [place], [facility] or [facility] do NOT schedule and route to nursing.  If pt has an imaging disc, the injection may be scheduled but pt has to bring disc to appt. If they show up w/out disc the injection cannot be done    Reminders (please tell patient if applicable):       Instructed pt to arrive 30 minutes early for IV start if this is for a cervical procedure, ALL sympathetic (stellate ganglion, hypogastric, or lumbar sympathetic block) and all sedation procedures (RFA, spinal cord stimulation trials).  Not Applicable   -IVs are not routinely placed for Dr. Sharpe cervical cases   -Dr. Ware: IVs for cervical ESIs and cervical TBDs (not CMBBs/facet inj)      If NPO for sedation, informed patient that it is okay to take medications with sips of water (except if they are to hold blood thinners).  Not Applicable   *DO take blood pressure medication if it is prescribed*      If this is for a cervical HENRIQUE, informed patient that aspirin needs to be held for 6 days.   Not Applicable      For all patients not having spinal cord stimulator (SCS) trials or radiofrequency ablations (RFAs), informed patient:    IV sedation is not provided for this procedure.  If you feel that an oral anti-anxiety medication is needed, you can discuss this further with your referring provider or primary care provider.  The Pain Clinic provider will discuss specifics of what the procedure includes at your appointment.  Most procedures last 10-20 minutes.  We use numbing medications to help with any discomfort during the procedure.  Not Applicable      Do not schedule procedures requiring IV placement in the first appointment of the day or first appointment after lunch. Do NOT schedule at 0745, 0815 or 1245.       For patients 85 or older we recommend having an adult stay w/ them for the remainder of the day.       Does the patient have any questions?  MAN MULLEN  Rahul  [facility] [place]

## 2019-07-23 NOTE — TELEPHONE ENCOUNTER
----- Message from Yesy Fong MD sent at 7/22/2019 10:49 AM CDT -----  Please contact patient to let her know that she has a positive urine and given the symptoms recommend treatment.  Please verify allergies and pharmacy but recommend doxycycline 100mg BID x 7 days as long as no contraindication

## 2019-07-24 DIAGNOSIS — J30.1 ALLERGIC RHINITIS DUE TO POLLEN, UNSPECIFIED SEASONALITY: Primary | ICD-10-CM

## 2019-07-24 RX ORDER — LISINOPRIL 20 MG/1
20 TABLET ORAL 2 TIMES DAILY
Qty: 62 TABLET | Refills: 98 | Status: ON HOLD | OUTPATIENT
Start: 2019-07-24 | End: 2019-01-01

## 2019-07-24 RX ORDER — FLUTICASONE PROPIONATE 50 MCG
SPRAY, SUSPENSION (ML) NASAL
Qty: 16 G | Refills: 97 | Status: SHIPPED | OUTPATIENT
Start: 2019-07-24 | End: 2019-09-11

## 2019-07-24 NOTE — TELEPHONE ENCOUNTER
An email was sent to Josephine to see if she is able to push the lumbar MRI from [facility] into our PACS system.    Lisette Osman RN-BSN  [facility]

## 2019-07-25 ENCOUNTER — HOSPITAL LABORATORY (OUTPATIENT)
Facility: OTHER | Age: 84
End: 2019-07-25

## 2019-07-25 LAB
ANION GAP SERPL CALCULATED.3IONS-SCNC: 7 MMOL/L (ref 3–14)
BUN SERPL-MCNC: 31 MG/DL (ref 7–30)
CALCIUM SERPL-MCNC: 10.2 MG/DL (ref 8.5–10.1)
CHLORIDE SERPL-SCNC: 107 MMOL/L (ref 94–109)
CO2 SERPL-SCNC: 30 MMOL/L (ref 20–32)
CREAT SERPL-MCNC: 1.15 MG/DL (ref 0.52–1.04)
GFR SERPL CREATININE-BSD FRML MDRD: 43 ML/MIN/{1.73_M2}
GLUCOSE SERPL-MCNC: 100 MG/DL (ref 70–99)
POTASSIUM SERPL-SCNC: 3.8 MMOL/L (ref 3.4–5.3)
SODIUM SERPL-SCNC: 144 MMOL/L (ref 133–144)

## 2019-07-25 NOTE — DISCHARGE SUMMARY
[facility]  Discharge Summary  Urologic Surgery     Jazmin Clifton MRN# [id number]   YOB: 1932 Age: 86 year old     Date of Admission:  6/10/2019  Date of Discharge::  6/11/2019 12:11 PM  Admitting Physician:  Yesy Fong MD  Discharge Physician:    Primary Care Physician:        Junie Estrella          Admission Diagnoses:   Gross Hematuria, Mass Of Urethra  Post-operative state            Discharge Diagnosis:   Same as above         Procedures:   Cystoscopy, bladder biopsy, urethral lesion excision              Consultations:   None         Imaging Studies:     Results for orders placed or performed in visit on 06/06/19   MR Pelvis (GYN) wo Contrast    Narrative    EXAMINATION: MR PELVIS (GYN) WO CONTRAST, 6/6/2019 7:21 PM    COMPARISON: CT 8/24/2007    HISTORY: bleeding urethral mass; Mass of urethra; Gross hematuria    TECHNIQUE: Multiplanar, multisequence imaging was obtained of the  pelvis without intravenous contrast. Although the exam was protocolled  with intravenous contrast, the patient was unable to tolerate the  remainder of the exam due to physical discomfort from laying on the  MRI scanner for a prolonged period of time. Thus, no post-contrast or  diffusion-weighted imaging was obtained.     FINDINGS: Image quality degraded by artifact from the patient's left  hip arthroplasty and patient motion.    Along the anterior margin of the urethra, there is a 2.8 x 2.6 x 2.0  cm mass with intermediate T2 signal intensity (series 4001 image 16  and series 3001 image 10). No involvement of the adjacent structures.    Bladder is mildly distended.     Post surgical changes of total abdominal hysterectomy with bilateral  salpingo-oophorectomies. Evidence of pelvic prolapse including a  peritoneocele and rectocele (series 4001 image 15) Sigmoid  diverticulosis. In the right inguinal region anterolateral to the  femoral artery/vein, there is a partially  visualized 11.7 x 7.4 cm T2  hyperintense cystic structure, previously 3.3 x 1.9 cm on 8/24/2007.    No bulky lymphadenopathy. No suspicious bone lesion. Slightly  posteriorly displaced coccygeal fracture (series 4001 image 14)      Impression    IMPRESSION:   1. Limited examination due to the lack of intravenous contrast and  patient motion.  2. 2.8 cm mass along the anterior margin of the urethra. No clear  involvement of the adjacent structures. No lymphadenopathy.  3. Bicompartmental pelvic prolapse with a peritoneocele and rectocele.  4. Partially visualized 11.7 cm T2 hyperintense cystic structure  anterolateral to the right femoral artery/vein, enlarged from  8/24/2007, possibly a lymphatic malformation or lymphangioma.    I have personally reviewed the examination and initial interpretation  and I agree with the findings.    CHRISS CORDON MD              Medications Prior to Admission:     No medications prior to admission.              Discharge Medications:     Discharge Medication List as of 6/11/2019 10:36 AM      CONTINUE these medications which have CHANGED    Details   acetaminophen (TYLENOL) 325 MG tablet Take 2 tablets (650 mg) by mouth every 4 hours as needed for mild pain, Disp-60 tablet, R-0, E-Prescribe         CONTINUE these medications which have NOT CHANGED    Details   ASPIRIN LOW DOSE 81 MG chewable tablet CHEW AND SWALLOW ONE TABLET BY MOUTH ONCE DAILY, Disp-30 tablet, R-11, E-Prescribe      atorvastatin (LIPITOR) 40 MG tablet TAKE 1 TABLET BY MOUTH ONCE DAILY, Disp-30 tablet, R-98, E-Prescribe      Blood Glucose Monitoring Suppl CORY 2 times daily Call nurse for further directions if blood glucose is less than 80 or greater than 250, Historical      BREO ELLIPTA 100-25 MCG/INH inhaler INHALE 1 PUFF BY MOUTH ONCE DAILY, Disp-1 Inhaler, R-11, E-Prescribe      calcium carbonate (TUMS) 500 MG chewable tablet Take 1 chew tab by mouth every hour as needed for heartburn, Historical       DONEPEZIL HCL PO Take 5 mg by mouth daily, Historical      DULoxetine (CYMBALTA) 30 MG capsule Take 2 capsules (60 mg) by mouth daily AND 1 capsule (30 mg) At Bedtime., Disp-90 capsule, R-11, E-Prescribe      ferrous sulfate (FEROSUL) 325 (65 Fe) MG tablet Take 1 tablet (325 mg) by mouth daily (with breakfast), Disp-60 tablet, R-98, E-Prescribe      furosemide (LASIX) 40 MG tablet Take 1 tablet (40 mg) by mouth 2 times daily, Disp-60 tablet, R-11, E-Prescribe      gabapentin (NEURONTIN) 100 MG capsule Take 2 capsules (200 mg) by mouth 2 times daily, Disp-120 capsule, R-11, E-Prescribe      hypromellose (ARTIFICIAL TEARS) 0.5 % SOLN ophthalmic solution Place 1 drop into both eyes every 6 hours as needed for dry eyes, Historical      isradipine (DYNACIRC) 5 MG capsule Take 5 mg by mouth 2 times daily, Historical      levalbuterol (XOPENEX) 1.25 MG/3ML neb solution Take 1 ampule by nebulization every 4 hours as needed for shortness of breath / dyspnea or wheezing And every 4 hours PRN, Historical      Lidocaine (LIDOCARE) 4 % Patch Place 1 patch onto the skin daily as needed To desired area (shoulder or hip). On for 12hrs, off for 12hrsHistorical      MAGNESIUM OXIDE PO Take 250 mg by mouth daily, Historical      Menthol, Topical Analgesic, (BIOFREEZE) 4 % GEL Externally apply topically 4 times daily as needed To left shoulder and right hipHistorical      nystatin (MYCOSTATIN) 214648 UNIT/GM external powder APPLY TOPICALLY TO ABDOMEN FOLDS, GROIN, AND BREASTS TWICE DAILY AS NEEDEDDisp-60 g, [id number]      OMEPRAZOLE PO Take 40 mg by mouth every morning, Historical      oxyCODONE (OXYCONTIN) 10 MG 12 hr tablet Take 1 tablet (10 mg) by mouth every 12 hours maximum 2 tablet(s) per day, Disp-60 tablet, R-0, E-Prescribe      oxyCODONE (ROXICODONE) 5 MG tablet Take 1 tablet (5 mg) by mouth 3 times daily. May also take 1 tablet (5 mg) 2 times daily as needed for severe pain. Continue until OxyContin ER available, then  stop, Disp-60 tablet, R-0, Local Print      senna-docusate (SENOKOT-S/PERICOLACE) 8.6-50 MG tablet Take 1 tablet by mouth 2 times daily as needed , Historical      tiZANidine (ZANAFLEX) 2 MG tablet TAKE 1 TABLET BY MOUTH THREE TIMES DAILY AS NEEDED, Disp-90 tablet, R-97, E-Prescribe      vitamin C (ASCORBIC ACID) 500 MG tablet TAKE 1 TAB BY MOUTH ONCE DAILY FOR SUPPLEMENT, R-0, Historical      vitamin D3 (CHOLECALCIFEROL) 1000 units (25 mcg) tablet Take 1 tablet (1,000 Units) by mouth daily, Disp-60 tablet, R-11, E-Prescribe      fluticasone (FLONASE) 50 MCG/ACT nasal spray Spray 1 spray into both nostrils 2 times daily, Historical      lisinopril (PRINIVIL/ZESTRIL) 20 MG tablet TAKE 1 TABLET BY MOUTH ONCE DAILY, Disp-30 tablet, R-98, E-Prescribe                      Brief History of Illness:   Ms. Clifton is an 86 year old who underwent elective bladder biopsy, urethral lesion excision on 6/10. This was planned as outpatient but she had difficulty weaning from oxygen and therefore was kept overnight for observation until she was able to be weaned to home oxygen. She has a history of COPD and this did not appear to represent a decompensation from her baseline.           Hospital Course:   Ms. Clifton underwent the above procedure planned as outpatient but she had difficulty weaning from oxygen and therefore was kept overnight for observation until she was able to be weaned to home oxygen. She has a history of COPD and this did not appear to represent a decompensation from her baseline. Therefore on POD 1 she was re-evaluated and was appropriate for discharge.       Patient was scheduled to follow up with Dr. Fong in clinic.         Day of Discharge Physical Exam:   See exam from 6/11/19 Progress note         Final Pathology Result:   Pending at time of discharge           Discharge Instructions and Follow-Up:     Discharge Procedure Orders   Discharge Instructions   Order Comments: Your Maldonado catheter will remain until  removed in clinic.    Stitches will dissolve on their own.    Depends/gauze for wound drainage. Some bloody drainage is expected for the first several days.     Activity  - No strenuous exercise for 4 weeks.  - No lifting, pushing, pulling more than 10 pounds for 2 weeks.   - Do not strain with bowel movements.  - Do not drive until you can press the brake pedal quickly and fully without pain.   - Do not operate a motor vehicle while taking narcotic pain medications.     Incisions  - You may shower and get incisions wet starting 48 hrs after surgery.  - Do not scrub incisions or submerge wounds for 2 weeks or until seen in follow-up.   - Remove wound dressing 48 hours after surgery.   - If purple dermabond glue was used, avoid applying any lotions or ointments.   - Leave incision open to air. Cover with gauze only if needed for comfort or to protect clothing from drainage.   - The stitches do not need to be removed, they will dissolve on their own.    Medications  - Transition from narcotic pain medications to tylenol (acetaminophen) as you are able.  Wean yourself off all pain medications as you are able.  - Some pain medications contain both tylenol (acetaminophen) and a narcotic (Norco, vicodin, percocet), do not take more than 4,000mg of Tylenol (acetaminophen) from all sources in any 24 hour period.  - Narcotics can make you constipated.  Take over the counter fiber (metamucil or benefiber) and stool softeners (miralax, docusate or senna) while taking narcotic pain medications, but stop if you develop diarrhea.  - No driving or operating machinery while taking narcotic pain medications     Follow-Up:  - Call your primary care provider to touch base regarding your recent admission.    - Call or return sooner than your regularly scheduled visit if you develop any of the following: fever (greater than 101.5), uncontrolled pain, uncontrolled nausea or vomiting, as well as increased redness, swelling, or drainage  "from your wound.     Phone numbers:   - Monday through Friday 8am to 4:30pm: Call [phone] with questions or to schedule or confirm appointment.    - Nights or weekends: call the after hours emergency pager - [phone] and tell the  \"I would like to page the Urology Resident on call.\"  - For emergencies, call [phone]     Reason for your hospital stay   Order Comments: Urethral surgery     Wound care and dressings   Order Comments: Depends or pads for any leakage, bloody drainage. Change as needed, at least daily.     Tubes and drains   Order Comments: You are going home with the following tubes or drains: muniz catheter.  Tube cares per hospital or home care instructions     Discharge Instructions   Order Comments: See prior discharge instructions. You will have the catheter until it is removed in clinic. Call clinic or report to the ER for fevers, chills or new symptoms.     Follow Up (TCM)   Order Comments: Follow up with primary care provider, Junie Estrella, for recent surgery.    Follow up with Dr. Fong as scheduled.    Appointments on [facility]/or [facility] (with [facility] or [facility] provider or service). Call [phone] if you haven't heard regarding these appointments within 7 days of discharge.     Order Specific Question Answer Comments   Transitional Care Patient? Yes      Adult [facility] Follow-up and recommended labs and tests   Order Comments: Follow up with Dr. Fong as scheduled  Appointments on [facility]/or [facility] (with [facility] or [facility] provider or service). Call [phone] if you haven't heard regarding these appointments within 7 days of discharge.     Activity   Order Comments: Your activity upon discharge: activity as tolerated. No heavy lifting - over 10 pounds - for two weeks.     Order Specific Question Answer Comments   Is discharge order? Yes      Diet   Order Comments: Follow this diet upon discharge: Regular     Order Specific Question Answer Comments   Is " discharge order? Yes             Home Health Care:   Not needed           Discharge Disposition:   Discharged to home      Condition at discharge: Stable      Dorothy Dillon MD  PGY 2 Urology

## 2019-07-25 NOTE — TELEPHONE ENCOUNTER
Patient scheduled. Also advised [facility] nursing office per patient request so that transportation can be arranged for patient.         Angelina Duran    [facility]

## 2019-07-25 NOTE — TELEPHONE ENCOUNTER
Okay to schedule.     MRI has been pushed in to our PACS system.    Lisette Osman RN-BSN  [facility]

## 2019-07-29 NOTE — PROGRESS NOTES
SUBJECTIVE/OBJECTIVE:                           Jazmin Clifton is an 86 year old female seen in their home for a follow-up visit for Medication Therapy Management.  She was referred to me from Junie Estrella NP.  I saw her along with her NP today as a co-visit.    Chief Complaint: follow-up from 4/30/19 MTM visit      Allergies/ADRs: Reviewed in Epic  Tobacco: History of tobacco dependence - quit 1992  Activity:  Uses a walker, electric chair, and wheelchair for ambulation. No recent falls.  PMH: Reviewed in Epic    Medication Adherence/Access:  no issues reported  Patient has assistance with medication administration: assisted living.    Dementia: Current medication includes Donepezil 5mg daily.  Memantine 5mg daily was dc'd following last visit.  Patient noted at previous visit that memory issues began right after a surgery that took longer than expected.  Denies confusion, and no reports of hallucinations or delusions.  She has concerns that her memory is worsening.  Prefers to continue with the Donepezil.  Denies side effects, although does note nausea; uncertain if related to the Donepezil.    Pain: Current medication includes Tylenol 1000mg tid, Biofreeze prn, Duloxetine 60mg qam and 30mg qpm, Gabapentin 200mg bid (increased 4/24/19), Oxycodone 5mg tid & bid prn, Tizanidine 2mg tid prn, prn Lidoderm.  Hot and ice packs also available, and patient has noted previously that the heat is helpful.  Received injections today as noted in Epic and she states these are helpful.  She states again today that the only way pain is relieved is if Tizanidine is used in combo with the Oxycodone, so has been taking Tizanidine with each scheduled Oxycodone dose. Follows with the pain clinic.  Has h/o 7 back surgeries.  H/o DDD and spinal stenosis of lumbar region.  Potential medication side effects she is experiencing: edema, fatigued (notes very tired, and will sleep a lot during the day; sleeping ok at night as well unless  pain wakes her).  Pain clinic has recommended keeping same total daily dose of oxycodone, but changing this to Oxycontin 10mg bid and Oxycodone IR 5mg once daily as needed.  NP has tried to order the Oxycontin, but PA has not yet been approved.  Potential medication side effects pt is experiencing: fatigue, edema, weakness.      Osteoporosis: Current therapy includes: Vitamin D3 1000u daily. Calcium 600/Vitamin D200 daily and alendronate (Fosamax) 70mg weekly were dc'd and vitamin D3 1000u daily was started following our last visit as recommended (due to Fosamax not recommended when CrCl<35, and pt's CrCl borderline for treatment).   Pt is getting the following sources of dietary calcium: milk; noted at previous visit she likes milk and drinks at least one glass daily  Last vitamin D level: 34.9 on 10/29/18 (this was while on calcium/vit D combo noted above)  Last calcium level: 10.2 on 7/25/29  DEXA History: most recent noted in Epic from 3/5/07 indicating normal BMD in lumbar spine, moderate osteopenia in right femoral neck, and severe osteopenia in left femoral neck  Risk factors: post-menopausal  chronic PPI use  As noted above, patient no longer ambulating on her own.    HFpEF/HTN/CAD/h/o CVA: Current medications include Furosemide 40mg bid at 8am and 2pm, Isradipine 5mg bid, Lisinopril 20mg bid, ASA 81mg daily.  Imdur has been dc'd since I last saw pt due to a hypotensive episode while working with OT after taking her meds.  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  Patient reports the following potential medication side effects: edema, weakness.   Note an unknown allergy listed in Epic for ACEis, but appears pt tolerating Lisinopril per chart review.  Does have a history of renal artery stenosis.  Denies dizziness, falls.  Does have occasional SOB, and when asked about chest pain, she states she has had occasional episodes of chest pain (difficult for her to describe), but states it goes away on its own right  away.  ECHO:  Date 7/26/17, EF 60-65% and Date 2/2019, EF 55-60%  Receiving lymphedema wraps.  BP Readings from Last 3 Encounters:   07/30/19 (!) 182/87   07/30/19 (!) 143/66   07/18/19 134/72     BP and pulse provided by AL staff today: 182/87mmHg, p70 (this was prior to receiving morning meds).  She reports that her SBP was 129 at her clinic visit for her injection today (following her meds).  NP notes BPs have typically been at goal recently.    GERD: Current medications include: Prilosec (omeprazole) 40mg once daily, prn Tums. The patient does have a history of GI bleed. Notes she has not needed to use prn Tums often.  NP notes that the Omeprazole was previously increased from 20mg to 40mg daily due to pt having swallowing issues with lower dose thought due to GERD.  No report of this today.    Supplements: Currently taking Ferrous Sulfate 325mg once daily (reduced from bid following our last visit), Mg Ox 250mg once daily, and vitamin D3 1000u daily as noted above. Began Mg supplement for leg cramps, and reports only occasional symptoms of leg cramps now.  No recent Mg level on file.  Most recent Hgb = 13.4 on 6/6/19 following reduction in iron dose.  Prior to this Hgb = 13.0 on 4/4/19.  2/19/19: Iron 27 L, Tsat 10 L, TIBC 258 WNL.    Constipation: Current medications include Senna-S 1 tab bid prn.  Drinks prune juice regularly which is helpful for her per her report.    UTI:  Currently on Doxycycline 100mg bid for a UTI.  Notes symptoms have improved.  I am unsure if pt uses Phenazopyridine.    Estimated Creatinine Clearance: 36.9 mL/min (A) (based on SCr of 1.15 mg/dL (H)).        ASSESSMENT:                             Current medications were reviewed today.     Medication Adherence: good, no issues identified    Dementia: Discussed potential side effects of Donepezil and may not be offering much benefit, however, pt prefers to continue at this time.    Pain: Follows with pain clinic.  Patient is on high  risk meds: Oxycodone, Tizanidine, Gabapentin, and combo increases risk of CNS and respiratory depression, falls, confusion, sedation, weakness, etc.  These risks were discussed today. Did discuss that pain meds likely contributing to her fatigue and weakness.  She notes that the fatigue/frequent naps do not bother her; goal is improved pain control.  NP plans to re-order Oxycontin as recommended by pain clinic and get PA started again so pt can begin receiving this.  Pt wishes to continue with the Tizanidine three times daily, but discussed it may be beneficial to take this at 2pm, 8pm, and may take in middle of night if awakes with pain (vs taking it at 8am).  May improve fatigue as well.      Osteoporosis: Due to calcium level somewhat elevated even following d'c of ca/vit D combo, may be of benefit to d'c plain vitamin D and follow-up calcium level in future.    HFpEF/HTN/CAD/h/o CVA: BP goal <150/90mmHg reasonable for this elderly patient at risk of hypotension, dizziness, falls, tissue/cerebral hypoperfusion.  BPs typically at goal, although high at times in the a.m. Prior to receiving meds. Continue to monitor.    GERD: Pt does have h/o gi bleed, anemia, and is on ASA, so should continue on PPI for gi protection.  Current dose has offered improvement in dysphagia.    Supplements: May benefit from d'c iron supplement and follow-up Hgb in one month.  May also benefit from checking Mg level to determine if Mg supplement still necessary.      Constipation: Stable.     UTI:  Improved.  Please consider d'c of prn Phenazopyridine due to CKD;  states contraindicated in renal disease due to med is renally eliminated and has been associated with ARF, hemolytic anemia, and methemoglobinemia when used in pts with CKD.    PLAN:                            1.  Take your as needed Tizanidine at 2pm, 8pm, and if needed in the middle of the night for pain (and avoid taking the 8am dose).  Your nurse practitioner is  working on getting a prior authorization for the Oxycontin.    2.  Stop Ferrous Sulfate and follow up Hgb in one month.    3.  Check Mg level in one month when Hgb checked.    4.  Please consider d'c vitamin D and follow-up calcium level.    5.  Please consider d'c of Phenazopyridine.      I spent 30 minutes with this patient today. A copy of the visit note was provided to the patient's referring provider.    Will follow up in 6 months, sooner if necessary.    The patient was mailed a summary of these recommendations as an after visit summary.     Uma Webber, Pharm.D.,[facility]  Board Certified Geriatric Pharmacist  Medication Therapy Management Pharmacist  [phone]

## 2019-07-30 ENCOUNTER — OFFICE VISIT (OUTPATIENT)
Dept: PHARMACY | Facility: CLINIC | Age: 84
End: 2019-07-30
Payer: COMMERCIAL

## 2019-07-30 ENCOUNTER — HOSPITAL ENCOUNTER (OUTPATIENT)
Dept: RADIOLOGY | Facility: CLINIC | Age: 84
Discharge: HOME | End: 2019-07-30
Attending: ANESTHESIOLOGY | Admitting: ANESTHESIOLOGY
Payer: COMMERCIAL

## 2019-07-30 ENCOUNTER — RADIOLOGY INJECTION OFFICE VISIT (OUTPATIENT)
Dept: PALLIATIVE MEDICINE | Facility: CLINIC | Age: 84
End: 2019-07-30
Payer: COMMERCIAL

## 2019-07-30 ENCOUNTER — TELEPHONE (OUTPATIENT)
Dept: GERIATRICS | Facility: CLINIC | Age: 84
End: 2019-07-30

## 2019-07-30 ENCOUNTER — ASSISTED LIVING VISIT (OUTPATIENT)
Dept: GERIATRICS | Facility: CLINIC | Age: 84
End: 2019-07-30
Payer: COMMERCIAL

## 2019-07-30 VITALS
DIASTOLIC BLOOD PRESSURE: 87 MMHG | SYSTOLIC BLOOD PRESSURE: 182 MMHG | RESPIRATION RATE: 16 BRPM | BODY MASS INDEX: 34.37 KG/M2 | TEMPERATURE: 98.4 F | WEIGHT: 194 LBS | OXYGEN SATURATION: 91 % | HEART RATE: 70 BPM

## 2019-07-30 VITALS — HEART RATE: 65 BPM | RESPIRATION RATE: 18 BRPM | DIASTOLIC BLOOD PRESSURE: 66 MMHG | SYSTOLIC BLOOD PRESSURE: 143 MMHG

## 2019-07-30 DIAGNOSIS — R52 PAIN: ICD-10-CM

## 2019-07-30 DIAGNOSIS — M51.369 DDD (DEGENERATIVE DISC DISEASE), LUMBAR: ICD-10-CM

## 2019-07-30 DIAGNOSIS — M48.061 SPINAL STENOSIS OF LUMBAR REGION, UNSPECIFIED WHETHER NEUROGENIC CLAUDICATION PRESENT: Primary | ICD-10-CM

## 2019-07-30 DIAGNOSIS — D63.1 ANEMIA OF CHRONIC RENAL FAILURE, STAGE 4 (SEVERE) (H): ICD-10-CM

## 2019-07-30 DIAGNOSIS — N39.0 URINARY TRACT INFECTION WITHOUT HEMATURIA, SITE UNSPECIFIED: ICD-10-CM

## 2019-07-30 DIAGNOSIS — I50.32 CHRONIC DIASTOLIC CONGESTIVE HEART FAILURE (H): ICD-10-CM

## 2019-07-30 DIAGNOSIS — K59.01 SLOW TRANSIT CONSTIPATION: ICD-10-CM

## 2019-07-30 DIAGNOSIS — R25.2 LEG CRAMPS: ICD-10-CM

## 2019-07-30 DIAGNOSIS — G31.84 MILD COGNITIVE IMPAIRMENT: ICD-10-CM

## 2019-07-30 DIAGNOSIS — I63.9 CEREBROVASCULAR ACCIDENT (CVA), UNSPECIFIED MECHANISM (H): ICD-10-CM

## 2019-07-30 DIAGNOSIS — I25.10 CORONARY ARTERY DISEASE INVOLVING NATIVE HEART WITHOUT ANGINA PECTORIS, UNSPECIFIED VESSEL OR LESION TYPE: ICD-10-CM

## 2019-07-30 DIAGNOSIS — N18.4 ANEMIA OF CHRONIC RENAL FAILURE, STAGE 4 (SEVERE) (H): ICD-10-CM

## 2019-07-30 DIAGNOSIS — N30.00 ACUTE CYSTITIS WITHOUT HEMATURIA: ICD-10-CM

## 2019-07-30 DIAGNOSIS — G89.4 CHRONIC PAIN SYNDROME: ICD-10-CM

## 2019-07-30 DIAGNOSIS — E63.9 NUTRITIONAL DEFICIENCY: ICD-10-CM

## 2019-07-30 DIAGNOSIS — I10 ESSENTIAL HYPERTENSION: ICD-10-CM

## 2019-07-30 DIAGNOSIS — M54.16 LUMBAR RADICULOPATHY: Primary | ICD-10-CM

## 2019-07-30 DIAGNOSIS — I10 ESSENTIAL HYPERTENSION, BENIGN: Primary | ICD-10-CM

## 2019-07-30 DIAGNOSIS — M54.16 LUMBAR RADICULOPATHY: ICD-10-CM

## 2019-07-30 DIAGNOSIS — K21.9 GASTROESOPHAGEAL REFLUX DISEASE, ESOPHAGITIS PRESENCE NOT SPECIFIED: ICD-10-CM

## 2019-07-30 DIAGNOSIS — M99.73 CONNECTIVE TISSUE AND DISC STENOSIS OF INTERVERTEBRAL FORAMINA OF LUMBAR REGION: ICD-10-CM

## 2019-07-30 DIAGNOSIS — M81.0 AGE-RELATED OSTEOPOROSIS WITHOUT CURRENT PATHOLOGICAL FRACTURE: ICD-10-CM

## 2019-07-30 DIAGNOSIS — M96.1 FAILED BACK SURGICAL SYNDROME: ICD-10-CM

## 2019-07-30 PROCEDURE — 25000128 H RX IP 250 OP 636: Performed by: ANESTHESIOLOGY

## 2019-07-30 PROCEDURE — 99207 ZZC NO CHARGE LOS: CPT | Performed by: PHARMACIST

## 2019-07-30 PROCEDURE — 64484 NJX AA&/STRD TFRM EPI L/S EA: CPT | Mod: RT | Performed by: ANESTHESIOLOGY

## 2019-07-30 PROCEDURE — 27210202 XR LUMBAR EPIDURAL INJECTION INCL IMAGING: Mod: TC

## 2019-07-30 PROCEDURE — 25500064 ZZH RX 255 OP 636: Performed by: ANESTHESIOLOGY

## 2019-07-30 PROCEDURE — 64483 NJX AA&/STRD TFRM EPI L/S 1: CPT | Mod: RT | Performed by: ANESTHESIOLOGY

## 2019-07-30 RX ORDER — ACETAMINOPHEN 500 MG
1000 TABLET ORAL 3 TIMES DAILY
COMMUNITY
End: 2020-01-01

## 2019-07-30 RX ORDER — BUPIVACAINE HYDROCHLORIDE 5 MG/ML
10 INJECTION, SOLUTION PERINEURAL ONCE
Status: COMPLETED | OUTPATIENT
Start: 2019-07-30 | End: 2019-07-30

## 2019-07-30 RX ORDER — DEXAMETHASONE SODIUM PHOSPHATE 10 MG/ML
10 INJECTION, SOLUTION INTRAMUSCULAR; INTRAVENOUS ONCE
Status: COMPLETED | OUTPATIENT
Start: 2019-07-30 | End: 2019-07-30

## 2019-07-30 RX ORDER — OXYCODONE HCL 10 MG/1
10 TABLET, FILM COATED, EXTENDED RELEASE ORAL EVERY 12 HOURS
Qty: 60 TABLET | Refills: 0 | Status: ON HOLD | OUTPATIENT
Start: 2019-07-30 | End: 2019-09-05

## 2019-07-30 RX ORDER — OXYCODONE HYDROCHLORIDE 5 MG/1
TABLET ORAL
Qty: 120 TABLET | Refills: 0 | Status: SHIPPED | OUTPATIENT
Start: 2019-07-30 | End: 2019-07-30

## 2019-07-30 RX ORDER — METHYLPREDNISOLONE ACETATE 40 MG/ML
40 INJECTION, SUSPENSION INTRA-ARTICULAR; INTRALESIONAL; INTRAMUSCULAR; SOFT TISSUE ONCE
Status: COMPLETED | OUTPATIENT
Start: 2019-07-30 | End: 2019-07-30

## 2019-07-30 RX ADMIN — BUPIVACAINE HYDROCHLORIDE 2 ML: 5 INJECTION, SOLUTION EPIDURAL; INTRACAUDAL; PERINEURAL at 08:23

## 2019-07-30 RX ADMIN — IOHEXOL 2 ML: 300 INJECTION, SOLUTION INTRAVENOUS at 08:23

## 2019-07-30 RX ADMIN — DEXAMETHASONE SODIUM PHOSPHATE 5 MG: 10 INJECTION, SOLUTION INTRAMUSCULAR; INTRAVENOUS at 08:23

## 2019-07-30 RX ADMIN — METHYLPREDNISOLONE ACETATE 40 MG: 40 INJECTION, SUSPENSION INTRA-ARTICULAR; INTRALESIONAL; INTRAMUSCULAR; SOFT TISSUE at 08:23

## 2019-07-30 ASSESSMENT — PAIN SCALES - GENERAL
PAINLEVEL: MILD PAIN (2)
PAINLEVEL: SEVERE PAIN (6)

## 2019-07-30 NOTE — LETTER
7/30/2019        RE: Jazmin Krishnamurthy [place]  [address]  [facility] [place] [address]        [facility]  [place] [facility] Record Number:  [id number]  Place of Service where encounter took place:  [facility] [[id number]]  Chief Complaint   Patient presents with     RECHECK       HPI:    Jazmin Clifton  is a 86 year old (12/30/1932), who is being seen today for an episodic care visit.  HPI information obtained from: facility chart records, facility staff, patient report and [facility] chart review. Today's concern is:     Chronic pain syndrome  DDD (degenerative disc disease), lumbar  Spinal stenosis of lumbar region, unspecified whether neurogenic claudication present  Anemia of chronic renal failure, stage 4 (severe) (H)  Essential hypertension  Leg cramps  Urinary tract infection without hematuria, site unspecified   Patient seen today on co-visit with [facility] pharmacist.  Yajaira says she is tired today, had both spinal and hip injections at chronic pain clinic this morning.  She states the injections worked really well and her pain is much improved this afternoon.  She has been following with the chronic pain clinic.  Pain clinic I recommended starting OxyContin 10 mg twice daily instead of current oxycodone regimen of 5 mg 3 times daily and twice daily as needed.  However prior authorization was needed.  This was completed however no response in 3 to 4 weeks.  Yajaira does report she often feels tired, but has also been using tizanidine regularly in addition to her oxycodone.  States sleeping a lot does not bother her, as she would rather be sleepy then be having pain.  She does not ambulate, and is primarily wheelchair-bound.    States she was told during her injection today that she would likely need a right hip replacement.  She states this is upsetting to her as she does not think she ever wants to have surgery again.  She has history of  multiple back surgeries.  Discussed with Yajaira that having hip replacement surgery would be her choice, and if she does not want surgery we will continue to evaluate nonoperative measures for pain control.    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  Did require Maldonado catheter for a brief time afterwards.  Pathology revealed that mass is not been malignant.  Joints and no further bleeding.  UA done at urology office recently was positive, urine culture grew greater than 100,000 of coagulase negative Staphylococcus she is currently on a course of doxycycline.  She reports her urinary symptoms of dysuria are now resolved.    Does have some shortness of breath with activity, but feels this is at her baseline.  Occasional cough.  Denies any wheezing.  Does admit to occasional short episodes of pain in the left upper chest.  She she states she never reports she is a staff as the last so shortly and do not concern her.     She does feel she is little more forgetful at times.  She was recently weaned off Namenda.  Discussed discontinuing Aricept as only questionable benefit, however she feels she is tolerating this well with only mild nausea at times, and does not want to discontinue.    Past Medical and Surgical History reviewed in Epic today.    MEDICATIONS:  Current Outpatient Medications   Medication Sig Dispense Refill     oxyCODONE (OXYCONTIN) 10 MG 12 hr tablet Take 1 tablet (10 mg) by mouth every 12 hours maximum 2 tablet(s) per day 60 tablet 0     acetaminophen (TYLENOL) 325 MG tablet Take 2 tablets (650 mg) by mouth every 4 hours as needed for mild pain 60 tablet 0     ASPIRIN LOW DOSE 81 MG chewable tablet CHEW AND SWALLOW ONE TABLET BY MOUTH ONCE DAILY 30 tablet 11     atorvastatin (LIPITOR) 40 MG tablet TAKE 1 TABLET BY MOUTH ONCE DAILY 30 tablet 98     Blood Glucose Monitoring Suppl CORY 2 times daily Call nurse for further directions if blood glucose is less than 80 or greater than  250       BREO ELLIPTA 100-25 MCG/INH inhaler INHALE 1 PUFF BY MOUTH ONCE DAILY 1 Inhaler 11     calcium carbonate (TUMS) 500 MG chewable tablet Take 1 chew tab by mouth every hour as needed for heartburn       DONEPEZIL HCL PO Take 5 mg by mouth daily       doxycycline hyclate (VIBRA-TABS) 100 MG tablet Take 1 tablet (100 mg) by mouth 2 times daily 14 tablet 0     DULoxetine (CYMBALTA) 30 MG capsule Take 2 capsules (60 mg) by mouth daily AND 1 capsule (30 mg) At Bedtime. 90 capsule 11     fluticasone (FLONASE) 50 MCG/ACT nasal spray INHALE 1 SPRAY IN EACH NOSTRIL TWICE DAILY 16 g 97     furosemide (LASIX) 40 MG tablet Take 1 tablet (40 mg) by mouth 2 times daily 60 tablet 11     gabapentin (NEURONTIN) 100 MG capsule Take 2 capsules (200 mg) by mouth 2 times daily 120 capsule 11     hypromellose (ARTIFICIAL TEARS) 0.5 % SOLN ophthalmic solution Place 1 drop into both eyes every 6 hours as needed for dry eyes       isradipine (DYNACIRC) 5 MG capsule Take 5 mg by mouth 2 times daily       levalbuterol (XOPENEX) 1.25 MG/3ML neb solution Take 1 ampule by nebulization every 4 hours as needed for shortness of breath / dyspnea or wheezing And every 4 hours PRN       Lidocaine (LIDOCARE) 4 % Patch Place 1 patch onto the skin daily as needed To desired area (shoulder or hip). On for 12hrs, off for 12hrs       lisinopril (PRINIVIL/ZESTRIL) 20 MG tablet Take 1 tablet (20 mg) by mouth 2 times daily 62 tablet 98     MAGNESIUM OXIDE PO Take 250 mg by mouth daily       Menthol, Topical Analgesic, (BIOFREEZE) 4 % GEL Externally apply topically 4 times daily as needed To left shoulder and right hip       nystatin (MYCOSTATIN) 251511 UNIT/GM external powder APPLY TOPICALLY TO ABDOMEN FOLDS, GROIN, AND BREASTS TWICE DAILY AS NEEDED 60 g 97     OMEPRAZOLE PO Take 40 mg by mouth every morning       oxyCODONE (ROXICODONE) 5 MG tablet TAKE 1 TABLET BY MOUTH THREE TIMES DAILY;& TAKE 1 TABLET BY MOUTH TWICE DAILY AS NEEDED FOR SEVERE PAIN  120 tablet 0     oxyCODONE (ROXICODONE) 5 MG tablet Take 1 tablet (5 mg) by mouth 3 times daily. May also take 1 tablet (5 mg) 2 times daily as needed for severe pain. Continue until OxyContin ER available, then stop 60 tablet 0     phenazopyridine (PYRIDIUM) 100 MG tablet Take 1 tablet (100 mg) by mouth 3 times daily as needed for urinary tract discomfort 9 tablet 1     senna-docusate (SENOKOT-S/PERICOLACE) 8.6-50 MG tablet Take 1 tablet by mouth 2 times daily as needed        tiZANidine (ZANAFLEX) 2 MG tablet TAKE 1 TABLET BY MOUTH THREE TIMES DAILY AS NEEDED 90 tablet 97     vitamin C (ASCORBIC ACID) 500 MG tablet TAKE 1 TAB BY MOUTH ONCE DAILY FOR SUPPLEMENT  0     vitamin D3 (CHOLECALCIFEROL) 1000 units (25 mcg) tablet Take 1 tablet (1,000 Units) by mouth daily 60 tablet 11         REVIEW OF SYSTEMS:  4 point ROS including Respiratory, CV, GI and , other than that noted in the HPI,  is negative    Objective:  BP (!) 182/87   Pulse 70   Temp 98.4  F (36.9  C)   Resp 16   Wt 88 kg (194 lb)   SpO2 91%   BMI 34.37 kg/m     Exam:  GENERAL APPEARANCE:  Alert, in no distress, oriented, cooperative  RESP:  respiratory effort and palpation of chest normal, lungs clear to auscultation , no respiratory distress, LACEY  CV:  Palpation and auscultation of heart done , regular rate and rhythm, no murmur, rub, or gallop, +2 pedal pulses, peripheral edema 1-2+ in BLE, compression wraps in place  ABDOMEN:  no guarding or rebound, bowel sounds normal  M/S:   primaily non-ambulatory, decrease ROM to left shoulder, no gross joint deformities,   SKIN:  Inspection of skin and subcutaneous tissue baseline, seroma to right groin is not palpable  NEURO:   Cranial nerves 2-12 are normal tested and grossly at patient's baseline  PSYCH:  oriented X 3, normal insight, judgement and memory, affect and mood normal    Labs:   Recent labs in [facility] reviewed by me today.     ASSESSMENT/PLAN:  (M48.061) Spinal stenosis of lumbar region,  unspecified whether neurogenic claudication present  (primary encounter diagnosis  (G89.4) Chronic pain syndrome  (M51.36) DDD (degenerative disc disease), lumbar  Comment: History of multiple back surgeries, most recently 1/2019.  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  Follows with pain clinic.  Previously recommended OxyContin as above, however have not heard back on prior authorization.  Steroid injections today do appear to have helped with pain.  Plan: We will reorder OxyContin 10 mg twice daily, with plans to discontinue scheduled oxycodone and just have oxycodone 5 mg as needed available daily.  Continue tizanidine 2 mg 3 times daily as needed, encourage patient to separate doses from oxycodone to reduce side effects and drowsiness.  Continue Tylenol 650 mg every 4 hours as needed, duloxetine 60 mg every morning, and 30 mg nightly, gabapentin 200 mg twice daily, lidocaine patch as needed, Biofreeze 4 times daily as needed, continue to follow-up with pain clinic.    (N18.4,  D63.1) Anemia of chronic renal failure, stage 4 (severe) (H)  Comment: Recent bleeding with urethral mass now resolved.  Hemoglobin has been within normal limits.  Plan: We will DC iron supplements and recheck hemoglobin in 1 month.    (I10) Essential hypertension  Comment: Elevated frequently in the morning, but normalized after blood pressure medication.  Was elevated with SBP in the 180s this a.m., but upon arrival to clinic for pain injection after receiving my medications at improved to 143/66.  Was 134/72 last week.  Suspect pain and anxiety contribute.  Plan: Continue furosemide 40 mg twice daily, isradipine 5 mg twice daily, lisinopril 20 mg twice daily, continue to monitor and adjust.    (R25.2) Leg cramps  Comment: Reports long history of leg cramps, improved with magnesium supplement.  Only occasional leg cramps now.  Plan: Check magnesium level in 1 month, if stable will DC magnesium supplement..    (N39.0) Urinary tract infection  without hematuria, site unspecified  Comment: Likely due to recent c catheter   Maldonado catheterPlan: Complete course of doxycycline, encourage adequate weight fluid intake.  Monitor    transcribed by : Aliyah Milan  Orders:  1. Discontinue iron supplement  2. Hgb, Mag level in 1 month  3. Oxycontin 10 mg PO every 12 hours - dx: chronic pain  4. Discontinue oxycodone 5 mg PO TID  After starting oxycontin - continue until medication available      Electronically signed by:  MARY Gómez CNP   Disclaimer:  This note consists of symbols derived from keyboarding, dictation, and/or voice recognition software.  As a result, there may be errors in the script that have gone undetected.  Please consider this when interpreting information found in the chart.            Sincerely,        MARY Gómez CNP

## 2019-07-30 NOTE — PATIENT INSTRUCTIONS
Recommendations from today's MTM visit:                                                        1.  Take your as needed Tizanidine at 2pm, 8pm, and if needed in the middle of the night for pain (and avoid taking the 8am dose).      2.  Stop Ferrous Sulfate and follow up hemoglobin in one month.    3.  Check Magnesium level in one month when hemoglobin checked.    4.  Please consider stopping vitamin D and follow-up calcium level.    5.  Please consider stopping Phenazopyridine.    Next MTM visit: 6 months, sooner if necessary    To schedule another MTM appointment, please call me directly at [phone].    My Clinical Pharmacist's contact information:                                                      It was a pleasure talking with you today!  Please feel free to contact me with any questions or concerns you have.      Uma Webber, Pharm.D.,[facility] [place] [facility]  Board Certified Geriatric Pharmacist  Medication Therapy Management Pharmacist  [phone]      You may receive a survey about the MTM services you received by email and/or US Mail.  I would appreciate your feedback to help me serve you better in the future. Your comments will be anonymous.

## 2019-07-30 NOTE — TELEPHONE ENCOUNTER
Prior Authorization Retail Medication Request    Medication/Dose: OXYCONTIN TAB 10MG CR  ICD code (if different than what is on RX):    Previously Tried and Failed:    Rationale:      Insurance Name:  [facility] [phone] (MEDICAL ASSISTANCE)  Insurance ID:  [id number]      Pharmacy Information (if different than what is on RX)  Name:  [facility]  Phone:  [phone]

## 2019-07-30 NOTE — IP AVS SNAPSHOT
[facility]  [address]  [facility] [address]  Phone:  [phone]  Fax:  [phone]                                    After Visit Summary   7/30/2019    Jazmin Clifton    MRN: [id number]           After Visit Summary Signature Page    I have received my discharge instructions, and my questions have been answered. I have discussed any challenges I see with this plan with the nurse or doctor.    ..........................................................................................................................................  Patient/Patient Representative Signature      ..........................................................................................................................................  Patient Representative Print Name and Relationship to Patient    ..................................................               ................................................  Date                                   Time    ..........................................................................................................................................  Reviewed by Signature/Title    ...................................................              ..............................................  Date                                               Time          22EPIC Rev 08/18

## 2019-07-30 NOTE — PROGRESS NOTES
Pre procedure Diagnosis: lumbar radiculopathy, lumbar degenerative disc disease   Post procedure Diagnosis: Same  Procedure performed: lumbar transforaminal epidural steroid injection at L2-3 and L3-4 on the right, fluoroscopically guided, contrast controlled  Anesthesia: none  Complications: technically difficult procedure due to multilevel fusion, significant spondylosis, scoliosis, and bridging osteophytes  Operators: Farhat Woodruff MD     Indications:   Jazmin Clifton is a 86 year old female was sent by MARY Mathis for lumbar epidural steroid injection.  They have a history of chronic lower back pain with pain radiating into the right hip, groin, and anterolateral thigh.  Exam shows well healed surgical scars, lumbar tenderness, antalgic gait, and equivocal SLR and they have tried conservative treatment including activity modifications, medications, and interventional procedures.    MRI was done on 11/1/18 which showed   CONCLUSION:  1.  Marked lumbar spondylosis. Moderate to marked canal and marked bilateral lateral recess narrowing at L1-L2. No other significant canal narrowing.    2.  Marked bilateral L1-L2 and moderate to marked left T12-L1 foraminal narrowing.    3.  Posterior instrumented fusion L2-L5 and anterior interbody fusion at L4-L5. Laminectomies from L3 through L5. Scattered areas of mild to moderate residual foraminal narrowing at the operative levels are somewhat poorly evaluated.    4.  Left convexity lumbar curvature and multiple levels of mild degenerative anterolisthesis and retrolisthesis detailed above.    5.  Multilevel degenerative disc disease and facet arthropathy.    Options/alternatives, benefits and risks were discussed with the patient including bleeding, infection, tissue trauma, numbness, weakness, paralysis, spinal cord injury, radiation exposure, headache and reaction to medications. Questions were answered to her satisfaction and she agrees to proceed.  Voluntary informed consent was obtained and signed.     Vitals were reviewed: Yes  /60   Pulse 68   Resp 16   Allergies were reviewed:  Yes   Medications were reviewed:  Yes   Pre-procedure pain score: 6/10    Procedure:  After getting informed consent, patient was brought into the procedure suite and was placed in a prone position on the procedure table.   A Pause for the Cause was performed.  Patient was prepped and draped in sterile fashion.     After identifying the right L2-3 and L3-4 neuroforamen, the C-arm was rotated to a right lateral oblique angle.  A total of 3 ml of Lidocaine 1% was used to anesthetize the skin and the needle tracks at the skin entry sites coaxial with the fluoroscopy beam, and overriding the superior aspect of the neuroforamen.  Then, 25 gauge 5 inch spinal needles were advanced under intermittent fluoroscopy until they entered the foramen superiorly.    The needle positions were then inspected from anteroposterior and lateral views, and the needles adjusted appropriately.  A total of 2 ml of Omnipaque-300 was injected, confirming appropriate position, with spread into the nerve root sheath and the epidural space, with no intravascular uptake. 8 ml was wasted    Then, after repeated negative aspiration, each level was injected with 2.5 ml of a combination of Depomedrol 40 mg, Decadron 5 mg, 0.5% bupivacaine 2 ml, diluted with 1.5 ml of normal saline for a total injectate volume of 5 ml and the needles were flushed with lidocaine and removed.    During the procedure, there was a paresthesia described as a pressure sensation with instillation of medication.  Hemostasis was achieved, the area was cleaned, and bandaids were placed when appropriate.  The patient tolerated the procedure well, and was taken to the recovery room.    Images were saved to PACS.    Post-procedure pain score: 2/10  Follow-up includes:   -f/u phone call in one week  -f/u with referring provider    Farhat PEREIRA  [place], MD  [facility]

## 2019-07-30 NOTE — IP AVS SNAPSHOT
MRN:[id number]                      After Visit Summary   7/30/2019    Jazmin Clifton    MRN: [id number]           Visit Information        Provider Department      7/30/2019  8:15 AM [facility]           Review of your medicines      UNREVIEWED medicines. Ask your doctor about these medicines       Dose / Directions   acetaminophen 325 MG tablet  Commonly known as:  TYLENOL  Used for:  Mass of urethra      Dose:  650 mg  Take 2 tablets (650 mg) by mouth every 4 hours as needed for mild pain  Quantity:  60 tablet  Refills:  0     ASPIRIN LOW DOSE 81 MG chewable tablet  Used for:  Congestive heart failure, unspecified HF chronicity, unspecified heart failure type (H)  Generic drug:  aspirin      CHEW AND SWALLOW ONE TABLET BY MOUTH ONCE DAILY  Quantity:  30 tablet  Refills:  11     atorvastatin 40 MG tablet  Commonly known as:  LIPITOR  Used for:  Congestive heart failure, unspecified HF chronicity, unspecified heart failure type (H)      TAKE 1 TABLET BY MOUTH ONCE DAILY  Quantity:  30 tablet  Refills:  98     BIOFREEZE 4 % Gel  Generic drug:  Menthol (Topical Analgesic)      Externally apply topically 4 times daily as needed To left shoulder and right hip  Refills:  0     BREO ELLIPTA 100-25 MCG/INH inhaler  Used for:  Chronic obstructive pulmonary disease, unspecified COPD type (H)  Generic drug:  fluticasone-vilanterol      INHALE 1 PUFF BY MOUTH ONCE DAILY  Quantity:  1 Inhaler  Refills:  11     calcium carbonate 500 MG chewable tablet  Commonly known as:  TUMS      Dose:  1 chew tab  Take 1 chew tab by mouth every hour as needed for heartburn  Refills:  0     DONEPEZIL HCL PO      Dose:  5 mg  Take 5 mg by mouth daily  Refills:  0     doxycycline hyclate 100 MG tablet  Commonly known as:  VIBRA-TABS  Used for:  Urinary tract infection      Dose:  100 mg  Take 1 tablet (100 mg) by mouth 2 times daily  Quantity:  14 tablet  Refills:  0     DULoxetine 30 MG  capsule  Commonly known as:  CYMBALTA  Used for:  Chronic pain syndrome, Depression with anxiety      Take 2 capsules (60 mg) by mouth daily AND 1 capsule (30 mg) At Bedtime.  Quantity:  90 capsule  Refills:  11     ferrous sulfate 325 (65 Fe) MG tablet  Commonly known as:  FEROSUL  Used for:  DDD (degenerative disc disease), lumbar, Anemia of chronic renal failure, stage 4 (severe) (H)      Dose:  325 mg  Take 1 tablet (325 mg) by mouth daily (with breakfast)  Quantity:  60 tablet  Refills:  98     fluticasone 50 MCG/ACT nasal spray  Commonly known as:  FLONASE  Used for:  Allergic rhinitis due to pollen, unspecified seasonality      INHALE 1 SPRAY IN EACH NOSTRIL TWICE DAILY  Quantity:  16 g  Refills:  97     furosemide 40 MG tablet  Commonly known as:  LASIX  Used for:  Congestive heart failure, unspecified HF chronicity, unspecified heart failure type (H)      Dose:  40 mg  Take 1 tablet (40 mg) by mouth 2 times daily  Quantity:  60 tablet  Refills:  11     gabapentin 100 MG capsule  Commonly known as:  NEURONTIN  Used for:  DDD (degenerative disc disease), lumbar, Spinal stenosis of lumbar region, unspecified whether neurogenic claudication present      Dose:  200 mg  Take 2 capsules (200 mg) by mouth 2 times daily  Quantity:  120 capsule  Refills:  11     hypromellose 0.5 % Soln ophthalmic solution  Commonly known as:  ARTIFICIAL TEARS      Dose:  1 drop  Place 1 drop into both eyes every 6 hours as needed for dry eyes  Refills:  0     isradipine 5 MG capsule  Commonly known as:  DYNACIRC      Dose:  5 mg  Take 5 mg by mouth 2 times daily  Refills:  0     levalbuterol 1.25 MG/3ML neb solution  Commonly known as:  XOPENEX      Dose:  1 ampule  Take 1 ampule by nebulization every 4 hours as needed for shortness of breath / dyspnea or wheezing And every 4 hours PRN  Refills:  0     Lidocaine 4 % Patch  Commonly known as:  LIDOCARE      Dose:  1 patch  Place 1 patch onto the skin daily as needed To desired area  (shoulder or hip). On for 12hrs, off for 12hrs  Refills:  0     lisinopril 20 MG tablet  Commonly known as:  PRINIVIL/ZESTRIL  Used for:  Congestive heart failure, unspecified HF chronicity, unspecified heart failure type (H)      Dose:  20 mg  Take 1 tablet (20 mg) by mouth 2 times daily  Quantity:  62 tablet  Refills:  98     MAGNESIUM OXIDE PO      Dose:  250 mg  Take 250 mg by mouth daily  Refills:  0     nystatin 670137 UNIT/GM external powder  Commonly known as:  MYCOSTATIN  Used for:  Rash      APPLY TOPICALLY TO ABDOMEN FOLDS, GROIN, AND BREASTS TWICE DAILY AS NEEDED  Quantity:  60 g  Refills:  97     OMEPRAZOLE PO  Indication:  Gastroesophageal Reflux Disease      Dose:  40 mg  Take 40 mg by mouth every morning  Refills:  0     * oxyCODONE 10 MG 12 hr tablet  Commonly known as:  oxyCONTIN  Used for:  Chronic pain syndrome, DDD (degenerative disc disease), lumbar  Ask about: Should I take this medication?      Dose:  10 mg  Take 1 tablet (10 mg) by mouth every 12 hours maximum 2 tablet(s) per day  Quantity:  60 tablet  Refills:  0     * oxyCODONE 5 MG tablet  Commonly known as:  ROXICODONE  Used for:  Chronic pain syndrome      Take 1 tablet (5 mg) by mouth 3 times daily. May also take 1 tablet (5 mg) 2 times daily as needed for severe pain. Continue until OxyContin ER available, then stop  Quantity:  60 tablet  Refills:  0     * oxyCODONE 5 MG tablet  Commonly known as:  ROXICODONE  Used for:  Chronic pain syndrome      TAKE 1 TABLET BY MOUTH THREE TIMES DAILY;AND MAY TAKE 1 TABLET BY MOUTH TWICE DAILY AS NEEDED FOR SEVERE PAIN  Quantity:  120 tablet  Refills:  0     phenazopyridine 100 MG tablet  Commonly known as:  PYRIDIUM  Used for:  Dysuria      Dose:  100 mg  Take 1 tablet (100 mg) by mouth 3 times daily as needed for urinary tract discomfort  Quantity:  9 tablet  Refills:  1     senna-docusate 8.6-50 MG tablet  Commonly known as:  SENOKOT-S/PERICOLACE      Dose:  1 tablet  Take 1 tablet by mouth 2  times daily as needed  Refills:  0     tiZANidine 2 MG tablet  Commonly known as:  ZANAFLEX  Used for:  Chronic pain syndrome      TAKE 1 TABLET BY MOUTH THREE TIMES DAILY AS NEEDED  Quantity:  90 tablet  Refills:  97     vitamin C 500 MG tablet  Commonly known as:  ASCORBIC ACID      TAKE 1 TAB BY MOUTH ONCE DAILY FOR SUPPLEMENT  Refills:  0     vitamin D3 1000 units (25 mcg) tablet  Commonly known as:  CHOLECALCIFEROL  Used for:  Age-related osteoporosis without current pathological fracture      Dose:  1000 Units  Take 1 tablet (1,000 Units) by mouth daily  Quantity:  60 tablet  Refills:  11         * This list has 3 medication(s) that are the same as other medications prescribed for you. Read the directions carefully, and ask your doctor or other care provider to review them with you.            CONTINUE these medicines which have NOT CHANGED       Dose / Directions   Blood Glucose Monitoring Suppl Aura      2 times daily Call nurse for further directions if blood glucose is less than 80 or greater than 250  Refills:  0              Protect others around you: Learn how to safely use, store and throw away your medicines at www.disposemymeds.org.       Follow-ups after your visit       Your next 10 appointments already scheduled    Jul 30, 2019 10:30 AM CDT  SHORT with Uma Webber [facility] ([facility]) [address] [place] [id number]  [phone]      Jul 30, 2019 11:00 AM CDT  Assisted Living with MARY Riley CNP  [facility] [address] [place] [id number]  [phone]      Aug 22, 2019  9:15 AM CDT  (Arrive by 9:00 AM)  Return Visit with Yesy Fong MD  [facility] ([facility]) [address]  [address]  [facility] [id number] [phone]         Care Instructions        Further instructions from your care team       [facility]   Procedure Discharge Instructions    Today you saw:    Dr. Farhat Woodruff    You had an:  Epidural steroid injection       Medications used:  Lidocaine   Bupivacaine   Dexamethasone Depo-medrol  Omnipaque             Be cautious when walking. Numbness and/or weakness in the lower extremities may occur for up to 6-8 hours after the procedure due to effect of the local anesthetic    Do not drive for 6 hours. The effect of the local anesthetic could slow your reflexes.     You may resume your regular activities after 24 hours    Avoid strenuous activity for the first 24 hours    You may shower, however avoid swimming, tub baths or hot tubs for 24 hours following your procedure    You may have a mild to moderate increase in pain for several days following the injection.    It may take up to 14 days for the steroid medication to start working although you may feel the effect as early as a few days after the procedure.       You may use ice packs for 10-15 minutes, 3 to 4 times a day at the injection site for comfort    Do not use heat to painful areas for 6 to 8 hours. This will give the local anesthetic time to wear off and prevent you from accidentally burning your skin.     Unless you have been directed to avoid the use of anti-inflammatory medications (NSAIDS), you may use medications such as ibuprofen, Aleve or Tylenol for pain control if needed.     Possible side effects of steroids that you may experience include flushing, elevated blood pressure, increased appetite, mild headaches and restlessness.  All of these symptoms will get better with time.    If you experience any of the following, call the Pain Clinic during work hours at [phone] or the Provider Line after hours at [phone]:  -Fever over 100 degree F  -Swelling, bleeding, redness, drainage, warmth at the injection site  -Progressive weakness or numbness in your  legs or arms  -Loss of bowel or bladder function  -Unusual headache that is not relieved by Tylenol or other pain reliever  -Unusual new onset of pain that is not improving          Additional Information About Your Visit       Care EveryWhere ID    This is your Care EveryWhere ID. This could be used by other organizations to access your [facility] medical records  [id number]        Primary Care Provider Office Phone # Fax #    Junie Estrella, MARY [phone]      Equal Access to Services    AISSATOU MCADAMS : Hadii aad ku hadasho Soomaali, waaxda luqadaha, qaybta kaalmada adeegyada, waxay idiin hayaan bren reed . So [facility] [phone].    ATENCIÓN: Si habla español, tiene a ortega disposición servicios gratuitos de asistencia lingüística. [facility] [phone].    We comply with applicable federal civil rights laws and [place] laws. We do not discriminate on the basis of race, color, national origin, age, disability, sex, sexual orientation, or gender identity.           Thank you!    Thank you for choosing [facility] for your care. Our goal is always to provide you with excellent care. Hearing back from our patients is one way we can continue to improve our services. Please take a few minutes to complete the written survey that you may receive in the mail after you visit with us. Thank you!            Medication List      Medications          Morning Afternoon Evening Bedtime As Needed    Blood Glucose Monitoring Suppl Aura  INSTRUCTIONS:  2 times daily Call nurse for further directions if blood glucose is less than 80 or greater than 250                       ASK your doctor about these medications          Morning Afternoon Evening Bedtime As Needed    acetaminophen 325 MG tablet  Also known as:  TYLENOL  INSTRUCTIONS:  Take 2 tablets (650 mg) by mouth every 4 hours as needed for mild pain                     ASPIRIN LOW DOSE 81 MG chewable tablet  INSTRUCTIONS:  CHEW AND SWALLOW  ONE TABLET BY MOUTH ONCE DAILY  Generic drug:  aspirin                     atorvastatin 40 MG tablet  Also known as:  LIPITOR  INSTRUCTIONS:  TAKE 1 TABLET BY MOUTH ONCE DAILY                     BIOFREEZE 4 % Gel  INSTRUCTIONS:  Externally apply topically 4 times daily as needed To left shoulder and right hip  Generic drug:  Menthol (Topical Analgesic)                     BREO ELLIPTA 100-25 MCG/INH inhaler  INSTRUCTIONS:  INHALE 1 PUFF BY MOUTH ONCE DAILY  Generic drug:  fluticasone-vilanterol                     calcium carbonate 500 MG chewable tablet  Also known as:  TUMS  INSTRUCTIONS:  Take 1 chew tab by mouth every hour as needed for heartburn                     DONEPEZIL HCL PO  INSTRUCTIONS:  Take 5 mg by mouth daily                     doxycycline hyclate 100 MG tablet  Also known as:  VIBRA-TABS  INSTRUCTIONS:  Take 1 tablet (100 mg) by mouth 2 times daily                     DULoxetine 30 MG capsule  Also known as:  CYMBALTA  INSTRUCTIONS:  Take 2 capsules (60 mg) by mouth daily AND 1 capsule (30 mg) At Bedtime.                     ferrous sulfate 325 (65 Fe) MG tablet  Also known as:  FEROSUL  INSTRUCTIONS:  Take 1 tablet (325 mg) by mouth daily (with breakfast)                     fluticasone 50 MCG/ACT nasal spray  Also known as:  FLONASE  INSTRUCTIONS:  INHALE 1 SPRAY IN EACH NOSTRIL TWICE DAILY                     furosemide 40 MG tablet  Also known as:  LASIX  INSTRUCTIONS:  Take 1 tablet (40 mg) by mouth 2 times daily                     gabapentin 100 MG capsule  Also known as:  NEURONTIN  INSTRUCTIONS:  Take 2 capsules (200 mg) by mouth 2 times daily                     hypromellose 0.5 % Soln ophthalmic solution  Also known as:  ARTIFICIAL TEARS  INSTRUCTIONS:  Place 1 drop into both eyes every 6 hours as needed for dry eyes                     isradipine 5 MG capsule  Also known as:  DYNACIRC  INSTRUCTIONS:  Take 5 mg by mouth 2 times daily                     levalbuterol 1.25 MG/3ML neb  solution  Also known as:  XOPENEX  INSTRUCTIONS:  Take 1 ampule by nebulization every 4 hours as needed for shortness of breath / dyspnea or wheezing And every 4 hours PRN                     Lidocaine 4 % Patch  Also known as:  LIDOCARE  INSTRUCTIONS:  Place 1 patch onto the skin daily as needed To desired area (shoulder or hip). On for 12hrs, off for 12hrs                     lisinopril 20 MG tablet  Also known as:  PRINIVIL/ZESTRIL  INSTRUCTIONS:  Take 1 tablet (20 mg) by mouth 2 times daily                     MAGNESIUM OXIDE PO  INSTRUCTIONS:  Take 250 mg by mouth daily                     nystatin 438951 UNIT/GM external powder  Also known as:  MYCOSTATIN  INSTRUCTIONS:  APPLY TOPICALLY TO ABDOMEN FOLDS, GROIN, AND BREASTS TWICE DAILY AS NEEDED                     OMEPRAZOLE PO  INSTRUCTIONS:  Take 40 mg by mouth every morning  Reason for med:  Gastroesophageal Reflux Disease                     * oxyCODONE 10 MG 12 hr tablet  Also known as:  oxyCONTIN  INSTRUCTIONS:  Take 1 tablet (10 mg) by mouth every 12 hours maximum 2 tablet(s) per day  Ask about: Should I take this medication?                     * oxyCODONE 5 MG tablet  Also known as:  ROXICODONE  INSTRUCTIONS:  Take 1 tablet (5 mg) by mouth 3 times daily. May also take 1 tablet (5 mg) 2 times daily as needed for severe pain. Continue until OxyContin ER available, then stop                     * oxyCODONE 5 MG tablet  Also known as:  ROXICODONE  INSTRUCTIONS:  TAKE 1 TABLET BY MOUTH THREE TIMES DAILY;AND MAY TAKE 1 TABLET BY MOUTH TWICE DAILY AS NEEDED FOR SEVERE PAIN                     phenazopyridine 100 MG tablet  Also known as:  PYRIDIUM  INSTRUCTIONS:  Take 1 tablet (100 mg) by mouth 3 times daily as needed for urinary tract discomfort                     senna-docusate 8.6-50 MG tablet  Also known as:  SENOKOT-S/PERICOLACE  INSTRUCTIONS:  Take 1 tablet by mouth 2 times daily as needed                     tiZANidine 2 MG tablet  Also known as:   ZANAFLEX  INSTRUCTIONS:  TAKE 1 TABLET BY MOUTH THREE TIMES DAILY AS NEEDED                     vitamin C 500 MG tablet  Also known as:  ASCORBIC ACID  INSTRUCTIONS:  TAKE 1 TAB BY MOUTH ONCE DAILY FOR SUPPLEMENT                     vitamin D3 1000 units (25 mcg) tablet  Also known as:  CHOLECALCIFEROL  INSTRUCTIONS:  Take 1 tablet (1,000 Units) by mouth daily                        * This list has 3 medication(s) that are the same as other medications prescribed for you. Read the directions carefully, and ask your doctor or other care provider to review them with you.

## 2019-07-30 NOTE — DISCHARGE INSTRUCTIONS
[facility]   Procedure Discharge Instructions    Today you saw:    Dr. Farhat Woodruff    You had an:  Epidural steroid injection       Medications used:  Lidocaine   Bupivacaine   Dexamethasone Depo-medrol  Omnipaque             Be cautious when walking. Numbness and/or weakness in the lower extremities may occur for up to 6-8 hours after the procedure due to effect of the local anesthetic    Do not drive for 6 hours. The effect of the local anesthetic could slow your reflexes.     You may resume your regular activities after 24 hours    Avoid strenuous activity for the first 24 hours    You may shower, however avoid swimming, tub baths or hot tubs for 24 hours following your procedure    You may have a mild to moderate increase in pain for several days following the injection.    It may take up to 14 days for the steroid medication to start working although you may feel the effect as early as a few days after the procedure.       You may use ice packs for 10-15 minutes, 3 to 4 times a day at the injection site for comfort    Do not use heat to painful areas for 6 to 8 hours. This will give the local anesthetic time to wear off and prevent you from accidentally burning your skin.     Unless you have been directed to avoid the use of anti-inflammatory medications (NSAIDS), you may use medications such as ibuprofen, Aleve or Tylenol for pain control if needed.     Possible side effects of steroids that you may experience include flushing, elevated blood pressure, increased appetite, mild headaches and restlessness.  All of these symptoms will get better with time.    If you experience any of the following, call the Pain Clinic during work hours at [phone] or the Provider Line after hours at [phone]:  -Fever over 100 degree F  -Swelling, bleeding, redness, drainage, warmth at the injection site  -Progressive weakness or numbness in your legs or arms  -Loss of bowel or bladder  function  -Unusual headache that is not relieved by Tylenol or other pain reliever  -Unusual new onset of pain that is not improving

## 2019-07-30 NOTE — PROGRESS NOTES
[facility]  [facility] Record Number:  [id number]  Place of Service where encounter took place:  [facility] [[id number]]  Chief Complaint   Patient presents with     RECHECK       HPI:    Jazmin Clifton  is a 86 year old (12/30/1932), who is being seen today for an episodic care visit.  HPI information obtained from: facility chart records, facility staff, patient report and [facility] Epic chart review. Today's concern is:     Chronic pain syndrome  DDD (degenerative disc disease), lumbar  Spinal stenosis of lumbar region, unspecified whether neurogenic claudication present  Anemia of chronic renal failure, stage 4 (severe) (H)  Essential hypertension  Leg cramps  Urinary tract infection without hematuria, site unspecified   Patient seen today on co-visit with [facility] pharmacist.  Yajaira says she is tired today, had both spinal and hip injections at chronic pain clinic this morning.  She states the injections worked really well and her pain is much improved this afternoon.  She has been following with the chronic pain clinic.  Pain clinic I recommended starting OxyContin 10 mg twice daily instead of current oxycodone regimen of 5 mg 3 times daily and twice daily as needed.  However prior authorization was needed.  This was completed however no response in 3 to 4 weeks.  Yajaira does report she often feels tired, but has also been using tizanidine regularly in addition to her oxycodone.  States sleeping a lot does not bother her, as she would rather be sleepy then be having pain.  She does not ambulate, and is primarily wheelchair-bound.    States she was told during her injection today that she would likely need a right hip replacement.  She states this is upsetting to her as she does not think she ever wants to have surgery again.  She has history of multiple back surgeries.  Discussed with Yajaira that having hip replacement surgery would be her choice, and if she  does not want surgery we will continue to evaluate nonoperative measures for pain control.    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  Did require Maldonado catheter for a brief time afterwards.  Pathology revealed that mass is not been malignant.  Joints and no further bleeding.  UA done at urology office recently was positive, urine culture grew greater than 100,000 of coagulase negative Staphylococcus she is currently on a course of doxycycline.  She reports her urinary symptoms of dysuria are now resolved.    Does have some shortness of breath with activity, but feels this is at her baseline.  Occasional cough.  Denies any wheezing.  Does admit to occasional short episodes of pain in the left upper chest.  She she states she never reports she is a staff as the last so shortly and do not concern her.     She does feel she is little more forgetful at times.  She was recently weaned off Namenda.  Discussed discontinuing Aricept as only questionable benefit, however she feels she is tolerating this well with only mild nausea at times, and does not want to discontinue.    Past Medical and Surgical History reviewed in Epic today.    MEDICATIONS:  Current Outpatient Medications   Medication Sig Dispense Refill     oxyCODONE (OXYCONTIN) 10 MG 12 hr tablet Take 1 tablet (10 mg) by mouth every 12 hours maximum 2 tablet(s) per day 60 tablet 0     acetaminophen (TYLENOL) 325 MG tablet Take 2 tablets (650 mg) by mouth every 4 hours as needed for mild pain 60 tablet 0     ASPIRIN LOW DOSE 81 MG chewable tablet CHEW AND SWALLOW ONE TABLET BY MOUTH ONCE DAILY 30 tablet 11     atorvastatin (LIPITOR) 40 MG tablet TAKE 1 TABLET BY MOUTH ONCE DAILY 30 tablet 98     Blood Glucose Monitoring Suppl CORY 2 times daily Call nurse for further directions if blood glucose is less than 80 or greater than 250       BREO ELLIPTA 100-25 MCG/INH inhaler INHALE 1 PUFF BY MOUTH ONCE DAILY 1 Inhaler 11     calcium carbonate  (TUMS) 500 MG chewable tablet Take 1 chew tab by mouth every hour as needed for heartburn       DONEPEZIL HCL PO Take 5 mg by mouth daily       doxycycline hyclate (VIBRA-TABS) 100 MG tablet Take 1 tablet (100 mg) by mouth 2 times daily 14 tablet 0     DULoxetine (CYMBALTA) 30 MG capsule Take 2 capsules (60 mg) by mouth daily AND 1 capsule (30 mg) At Bedtime. 90 capsule 11     fluticasone (FLONASE) 50 MCG/ACT nasal spray INHALE 1 SPRAY IN EACH NOSTRIL TWICE DAILY 16 g 97     furosemide (LASIX) 40 MG tablet Take 1 tablet (40 mg) by mouth 2 times daily 60 tablet 11     gabapentin (NEURONTIN) 100 MG capsule Take 2 capsules (200 mg) by mouth 2 times daily 120 capsule 11     hypromellose (ARTIFICIAL TEARS) 0.5 % SOLN ophthalmic solution Place 1 drop into both eyes every 6 hours as needed for dry eyes       isradipine (DYNACIRC) 5 MG capsule Take 5 mg by mouth 2 times daily       levalbuterol (XOPENEX) 1.25 MG/3ML neb solution Take 1 ampule by nebulization every 4 hours as needed for shortness of breath / dyspnea or wheezing And every 4 hours PRN       Lidocaine (LIDOCARE) 4 % Patch Place 1 patch onto the skin daily as needed To desired area (shoulder or hip). On for 12hrs, off for 12hrs       lisinopril (PRINIVIL/ZESTRIL) 20 MG tablet Take 1 tablet (20 mg) by mouth 2 times daily 62 tablet 98     MAGNESIUM OXIDE PO Take 250 mg by mouth daily       Menthol, Topical Analgesic, (BIOFREEZE) 4 % GEL Externally apply topically 4 times daily as needed To left shoulder and right hip       nystatin (MYCOSTATIN) 082061 UNIT/GM external powder APPLY TOPICALLY TO ABDOMEN FOLDS, GROIN, AND BREASTS TWICE DAILY AS NEEDED 60 g 97     OMEPRAZOLE PO Take 40 mg by mouth every morning       oxyCODONE (ROXICODONE) 5 MG tablet TAKE 1 TABLET BY MOUTH THREE TIMES DAILY;& TAKE 1 TABLET BY MOUTH TWICE DAILY AS NEEDED FOR SEVERE PAIN 120 tablet 0     oxyCODONE (ROXICODONE) 5 MG tablet Take 1 tablet (5 mg) by mouth 3 times daily. May also take 1  tablet (5 mg) 2 times daily as needed for severe pain. Continue until OxyContin ER available, then stop 60 tablet 0     phenazopyridine (PYRIDIUM) 100 MG tablet Take 1 tablet (100 mg) by mouth 3 times daily as needed for urinary tract discomfort 9 tablet 1     senna-docusate (SENOKOT-S/PERICOLACE) 8.6-50 MG tablet Take 1 tablet by mouth 2 times daily as needed        tiZANidine (ZANAFLEX) 2 MG tablet TAKE 1 TABLET BY MOUTH THREE TIMES DAILY AS NEEDED 90 tablet 97     vitamin C (ASCORBIC ACID) 500 MG tablet TAKE 1 TAB BY MOUTH ONCE DAILY FOR SUPPLEMENT  0     vitamin D3 (CHOLECALCIFEROL) 1000 units (25 mcg) tablet Take 1 tablet (1,000 Units) by mouth daily 60 tablet 11         REVIEW OF SYSTEMS:  4 point ROS including Respiratory, CV, GI and , other than that noted in the HPI,  is negative    Objective:  BP (!) 182/87   Pulse 70   Temp 98.4  F (36.9  C)   Resp 16   Wt 88 kg (194 lb)   SpO2 91%   BMI 34.37 kg/m    Exam:  GENERAL APPEARANCE:  Alert, in no distress, oriented, cooperative  RESP:  respiratory effort and palpation of chest normal, lungs clear to auscultation , no respiratory distress, LACEY  CV:  Palpation and auscultation of heart done , regular rate and rhythm, no murmur, rub, or gallop, +2 pedal pulses, peripheral edema 1-2+ in BLE, compression wraps in place  ABDOMEN:  no guarding or rebound, bowel sounds normal  M/S:   primaily non-ambulatory, decrease ROM to left shoulder, no gross joint deformities,   SKIN:  Inspection of skin and subcutaneous tissue baseline, seroma to right groin is not palpable  NEURO:   Cranial nerves 2-12 are normal tested and grossly at patient's baseline  PSYCH:  oriented X 3, normal insight, judgement and memory, affect and mood normal    Labs:   Recent labs in [facility] reviewed by me today.     ASSESSMENT/PLAN:  (M48.061) Spinal stenosis of lumbar region, unspecified whether neurogenic claudication present  (primary encounter diagnosis  (G89.4) Chronic pain  syndrome  (M51.36) DDD (degenerative disc disease), lumbar  Comment: History of multiple back surgeries, most recently 1/2019.  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  Follows with pain clinic.  Previously recommended OxyContin as above, however have not heard back on prior authorization.  Steroid injections today do appear to have helped with pain.  Plan: We will reorder OxyContin 10 mg twice daily, with plans to discontinue scheduled oxycodone and just have oxycodone 5 mg as needed available daily.  Continue tizanidine 2 mg 3 times daily as needed, encourage patient to separate doses from oxycodone to reduce side effects and drowsiness.  Continue Tylenol 650 mg every 4 hours as needed, duloxetine 60 mg every morning, and 30 mg nightly, gabapentin 200 mg twice daily, lidocaine patch as needed, Biofreeze 4 times daily as needed, continue to follow-up with pain clinic.    (N18.4,  D63.1) Anemia of chronic renal failure, stage 4 (severe) (H)  Comment: Recent bleeding with urethral mass now resolved.  Hemoglobin has been within normal limits.  Plan: We will DC iron supplements and recheck hemoglobin in 1 month.    (I10) Essential hypertension  Comment: Elevated frequently in the morning, but normalized after blood pressure medication.  Was elevated with SBP in the 180s this a.m., but upon arrival to clinic for pain injection after receiving my medications at improved to 143/66.  Was 134/72 last week.  Suspect pain and anxiety contribute.  Plan: Continue furosemide 40 mg twice daily, isradipine 5 mg twice daily, lisinopril 20 mg twice daily, continue to monitor and adjust.    (R25.2) Leg cramps  Comment: Reports long history of leg cramps, improved with magnesium supplement.  Only occasional leg cramps now.  Plan: Check magnesium level in 1 month, if stable will DC magnesium supplement..    (N39.0) Urinary tract infection without hematuria, site unspecified  Comment: Likely due to recent c catheter   Maldonado catheterPlan:  Complete course of doxycycline, encourage adequate weight fluid intake.  Monitor    transcribed by : Aliyah Milan  Orders:  1. Discontinue iron supplement  2. Hgb, Mag level in 1 month  3. Oxycontin 10 mg PO every 12 hours - dx: chronic pain  4. Discontinue oxycodone 5 mg PO TID  After starting oxycontin - continue until medication available      Electronically signed by:  MARY Gómez CNP   Disclaimer:  This note consists of symbols derived from keyboarding, dictation, and/or voice recognition software.  As a result, there may be errors in the script that have gone undetected.  Please consider this when interpreting information found in the chart.

## 2019-07-30 NOTE — Clinical Note
Velasquez Kellyn! Thanks for seeing her with me today!  I had a couple other thoughts as I was documenting - see note re Vitamin D (I know we started this after last visit, but calcium is high) - so wonder about dc'ing it.  Also - the phenazopyridine should be avoided due to her CKD.  Let me know if questions!  Thanks again!

## 2019-07-31 ENCOUNTER — TELEPHONE (OUTPATIENT)
Dept: GERIATRICS | Facility: CLINIC | Age: 84
End: 2019-07-31

## 2019-07-31 NOTE — TELEPHONE ENCOUNTER
Prior Authorization Retail Medication Request    Medication/Dose: Oxycontin 10 mg - 12 hour tablet  ICD code (if different than what is on RX): G89.4  Previously Tried and Failed:  We have tried short acting oxycodone, tylenol, tizanidine, and steroid injections. she was evaluated by the pain management clinic and they recommended trying oxycontin for better pain control. She also has a morphine allergy so we cannot do MS contin  Rationale:      Insurance Name:  [employer] [facility]  Insurance ID:  [id number]     Insurance Name: [employer]  Insurance ID: [id number]     Insurance Name: [employer]*  Insurance ID: [id number]     Pharmacy Information (if different than what is on RX)  Name:  [facility]  Phone:  [phone]

## 2019-08-09 ENCOUNTER — PRE VISIT (OUTPATIENT)
Dept: UROLOGY | Facility: CLINIC | Age: 84
End: 2019-08-09

## 2019-08-09 ENCOUNTER — TELEPHONE (OUTPATIENT)
Dept: PALLIATIVE MEDICINE | Facility: CLINIC | Age: 84
End: 2019-08-09

## 2019-08-09 NOTE — TELEPHONE ENCOUNTER
Central Prior Authorization Team   Phone: [phone]    Prior Authorization Approval    Authorization Effective Date: 6/1/2019  Authorization Expiration Date: 8/8/2020  Medication: Oxycontin 10 mg - 12 hour tablet  Approved Dose/Quantity:   Reference #: [id number]   Insurance Company: [employer] - Phone [phone] Fax [phone]  Expected CoPay:       CoPay Card Available:      Foundation Assistance Needed:    Which Pharmacy is filling the prescription (Not needed for infusion/clinic administered): [facility] - [place], [place] - [address] [place] [address]  Pharmacy Notified: Yes  Patient Notified: Yes  **Instructed pharmacy to notify patient when script is ready to /ship.**

## 2019-08-09 NOTE — TELEPHONE ENCOUNTER
Central Prior Authorization Team   Phone: [phone]    PA Initiation    Medication: Oxycontin 10 mg - 12 hour tablet  Insurance Company: [employer] - Phone [phone] Fax [phone]  Pharmacy Filling the Rx: [facility] [address] [place] [address]  Filling Pharmacy Phone: [phone]  Filling Pharmacy Fax:    Start Date: 8/9/2019    Jazmin Clifton (Back: [id number])

## 2019-08-09 NOTE — TELEPHONE ENCOUNTER
Patient had a lumbar transforaminal epidural steroid injection at L2-3 and L3-4 on the right on 7/30/19.  Called patient for an update.      Pt reported the following details:  Doing really good until she got sick (sweats, cough, weakness, etc..).       Told patient that the information will be forwarded to her provider.  Also explained that, if a steroid medication was used, it could take up to 14 days to feel the full effect and if pt has any further questions or concerns pt should call the clinic at [phone].

## 2019-08-11 NOTE — TELEPHONE ENCOUNTER
Information noted - allow two weeks for steroid effect.    Farhat Woodruff MD  [facility]

## 2019-08-12 DIAGNOSIS — G89.4 CHRONIC PAIN SYNDROME: ICD-10-CM

## 2019-08-12 DIAGNOSIS — F11.90 CHRONIC, CONTINUOUS USE OF OPIOIDS: Primary | ICD-10-CM

## 2019-08-12 RX ORDER — OXYCODONE HYDROCHLORIDE 5 MG/1
5 TABLET ORAL DAILY PRN
Qty: 60 TABLET | Refills: 0 | Status: ON HOLD | OUTPATIENT
Start: 2019-08-12 | End: 2019-09-05

## 2019-08-13 ENCOUNTER — ASSISTED LIVING VISIT (OUTPATIENT)
Dept: GERIATRICS | Facility: CLINIC | Age: 84
End: 2019-08-13
Payer: COMMERCIAL

## 2019-08-13 VITALS
HEART RATE: 78 BPM | BODY MASS INDEX: 34.19 KG/M2 | TEMPERATURE: 98 F | DIASTOLIC BLOOD PRESSURE: 56 MMHG | WEIGHT: 193 LBS | OXYGEN SATURATION: 97 % | SYSTOLIC BLOOD PRESSURE: 123 MMHG | RESPIRATION RATE: 20 BRPM

## 2019-08-13 DIAGNOSIS — F43.21 GRIEF: ICD-10-CM

## 2019-08-13 DIAGNOSIS — J01.00 SUBACUTE MAXILLARY SINUSITIS: ICD-10-CM

## 2019-08-13 DIAGNOSIS — S40.022A TRAUMATIC HEMATOMA OF LEFT UPPER ARM, INITIAL ENCOUNTER: Primary | ICD-10-CM

## 2019-08-13 NOTE — PROGRESS NOTES
[facility]  [facility] Record Number:  [id number]  Place of Service where encounter took place:  [facility] [[id number]]  Chief Complaint   Patient presents with     RECHECK       HPI:    Jazmin Clifton  is a 86 year old (12/30/1932), who is being seen today for an episodic care visit.  HPI information obtained from: facility chart records, facility staff, patient report and [facility] Epic chart review. Today's concern is:     Traumatic hematoma of left upper arm, initial encounter  Grief  Subacute maxillary sinusitis   Asked to see patient today to evaluate injury to left arm.  Apparently on 8/11 patient had been attempting to sit up at from lying position in bed using bed rail, when she felt sudden sharp pain to left arm.  Developed large swollen tender area, purplish in color.  X-rays done to left humerus and left shoulder on 8/12 are negative for fractures, show degenerative changes to left shoulder.  Aspirin was held today on 8/13.  Yajaira continues to report tenderness to purple area on left arm, thinks it is slightly bigger.  Is able to move arm some, does have limited range of motion at baseline due to chronic left shoulder pain.    Yajaira also reports mild tenderness to right cheek, near maxillary sinus, but also some mild edema and tenderness to right jaw, and external right ear near tragus.  Does states she feels some pressure in her ear.  States she has had a cold for the past 4 to 5 days, but it is getting better.  Previously had a sore throat now resolved.  Did have cough, which she reports is significantly improved she is afebrile.  On room air.  Able to sleep without issue at night.    Yajaira also reports feeling very sad as her roommate passed away 2 days ago.  She states they were very close and is very hard for her to be in the apartment without her roommate.  She has requested to move to a new apartment, and her son's plan to move for this  weekend.  She states she feels very sad and that she has no one she can talk to about her feelings.    Facility nurse is present during exam today.    Past Medical and Surgical History reviewed in Epic today.    MEDICATIONS:  Current Outpatient Medications   Medication Sig Dispense Refill     acetaminophen (TYLENOL) 500 MG tablet Take 1,000 mg by mouth 3 times daily       ASPIRIN LOW DOSE 81 MG chewable tablet CHEW AND SWALLOW ONE TABLET BY MOUTH ONCE DAILY 30 tablet 11     atorvastatin (LIPITOR) 40 MG tablet TAKE 1 TABLET BY MOUTH ONCE DAILY 30 tablet 98     Blood Glucose Monitoring Suppl CORY 2 times daily Call nurse for further directions if blood glucose is less than 80 or greater than 250       BREO ELLIPTA 100-25 MCG/INH inhaler INHALE 1 PUFF BY MOUTH ONCE DAILY 1 Inhaler 11     calcium carbonate (TUMS) 500 MG chewable tablet Take 1 chew tab by mouth every hour as needed for heartburn       DONEPEZIL HCL PO Take 5 mg by mouth daily       DULoxetine (CYMBALTA) 30 MG capsule Take 2 capsules (60 mg) by mouth daily AND 1 capsule (30 mg) At Bedtime. 90 capsule 11     Emollient (MOISTURIZING LOTION EX) Externally apply topically 2 times daily To bilateral lower extremeties       fluticasone (FLONASE) 50 MCG/ACT nasal spray INHALE 1 SPRAY IN EACH NOSTRIL TWICE DAILY 16 g 97     furosemide (LASIX) 40 MG tablet Take 1 tablet (40 mg) by mouth 2 times daily 60 tablet 11     gabapentin (NEURONTIN) 100 MG capsule Take 2 capsules (200 mg) by mouth 2 times daily 120 capsule 11     Hypromellose (NATURAL BALANCE TEARS OP) Apply 1 drop to eye every 6 hours as needed (to both eyes)       isradipine (DYNACIRC) 5 MG capsule Take 5 mg by mouth 2 times daily       levalbuterol (XOPENEX) 1.25 MG/3ML neb solution Take 1 ampule by nebulization every 4 hours as needed for shortness of breath / dyspnea or wheezing And every 4 hours PRN       Lidocaine (LIDOCARE) 4 % Patch Place 1 patch onto the skin daily as needed To desired area  (shoulder or hip). On for 12hrs, off for 12hrs       lisinopril (PRINIVIL/ZESTRIL) 20 MG tablet Take 1 tablet (20 mg) by mouth 2 times daily 62 tablet 98     MAGNESIUM OXIDE PO Take 250 mg by mouth daily       Menthol, Topical Analgesic, (BIOFREEZE) 4 % GEL Externally apply topically 4 times daily as needed To left shoulder and right hip       nystatin (MYCOSTATIN) 507730 UNIT/GM external powder APPLY TOPICALLY TO ABDOMEN FOLDS, GROIN, AND BREASTS TWICE DAILY AS NEEDED 60 g 97     OMEPRAZOLE PO Take 40 mg by mouth every morning       oxyCODONE (OXYCONTIN) 10 MG 12 hr tablet Take 1 tablet (10 mg) by mouth every 12 hours maximum 2 tablet(s) per day 60 tablet 0     oxyCODONE (ROXICODONE) 5 MG tablet Take 1 tablet (5 mg) by mouth daily as needed for severe pain 60 tablet 0     phenazopyridine (PYRIDIUM) 100 MG tablet Take 1 tablet (100 mg) by mouth 3 times daily as needed for urinary tract discomfort 9 tablet 1     senna-docusate (SENOKOT-S/PERICOLACE) 8.6-50 MG tablet Take 1 tablet by mouth 2 times daily as needed        tiZANidine (ZANAFLEX) 2 MG tablet TAKE 1 TABLET BY MOUTH THREE TIMES DAILY AS NEEDED 90 tablet 97     vitamin D3 (CHOLECALCIFEROL) 1000 units (25 mcg) tablet Take 1 tablet (1,000 Units) by mouth daily 60 tablet 11     doxycycline hyclate (VIBRA-TABS) 100 MG tablet Take 1 tablet (100 mg) by mouth 2 times daily 14 tablet 0     naloxone (NARCAN) 4 MG/0.1ML nasal spray Spray 1 spray (4 mg) into one nostril alternating nostrils once as needed for opioid reversal q 2-3 min until responsive/EMS arrive 0.2 mL 0         REVIEW OF SYSTEMS:  4 point ROS including Respiratory, CV, GI and , other than that noted in the HPI,  is negative    Objective:  /56   Pulse 78   Temp 98  F (36.7  C)   Resp 20   Wt 87.5 kg (193 lb)   SpO2 97%   BMI 34.19 kg/m    Exam:  GENERAL APPEARANCE:  Alert, in no distress, oriented, cooperative  ENT:  Mouth and posterior oropharynx normal, moist mucous membranes, ear  canal:tender tragus , no impaction noted, no discharge noted, mild right maxilary tenderness  EYES:  EOM, conjunctivae, lids, pupils and irises normal  NECK:  mild swelling to right jawline, no adenopathy, no thyromegaly  RESP:  respiratory effort and palpation of chest normal, lungs clear to auscultation , no respiratory distress  CV:  Palpation and auscultation of heart done , regular rate and rhythm, no murmur, rub, or gallop, +2 pedal pulses, peripheral edema 2-3+ in BLE, farrow wrapsin place  ABDOMEN:  normal bowel sounds, soft, nontender, no hepatosplenomegaly or other masses  M/S:   Primarily nonambulatory, decreased range of motion to left shoulder, large area of purple discoloration, moderately hard to left bicep, tender to touch  SKIN:  Hematoma to left bicep  NEURO:   Cranial nerves 2-12 are normal tested and grossly at patient's baseline  PSYCH:  oriented X 3, normal insight, judgement and memory, sad, crying    Labs:   Recent labs in [facility] reviewed by me today.     ASSESSMENT/PLAN:  (S40.022A) Traumatic hematoma of left upper arm, initial encounter  (primary encounter diagnosis)  Comment: Appears to be hematoma to right bicep, likely popped a blood vessel during attempt to transfer.  X-rays negative.  Plan: Continue current pain regimen of Tylenol, OxyContin, tizanidine.  Ice and elevate left upper extremity as tolerated.  Monitor for change    (F43.21) Grief  Comment: Roommate recently passed away and patient was close to  Plan: Facility nurse has reached out to case coordinator as well as hospice team that had been following her roommate for options for possible support systems for Yajaira to speak to to help her through this time.  Plan to continue apartment this weekend.  Continue duloxetine 60 mg every morning and 30 mg nightly.    (J01.00) Subacute maxillary sinusitis  Comment: Likely viral, afebrile, per patient and staff symptoms are improving.  Plan: Continue to monitor, if symptoms worsen or no  improvement update provider.  Encourage appropriate fluid intake.    transcribed by : Aliyah Milan  Orders:  1. BMP - add to lab schedule for 2 weeks (Hgb, Mg)  2. Discontinue vitamin D3   3. Discontinue Phenozopyridium  4. Ice, elevate LUE PRN for comfort      Electronically signed by:  MARY Gómez CNP   \  Disclaimer:  This note consists of symbols derived from keyboarding, dictation, and/or voice recognition software.  As a result, there may be errors in the script that have gone undetected.  Please consider this when interpreting information found in the chart.

## 2019-08-13 NOTE — LETTER
8/13/2019        RE: Jazmin Krishnamurthy [place]  [address]  [facility] [address]        [facility]  [place] [facility] Record Number:  [id number]  Place of Service where encounter took place:  [facility] [[id number]]  Chief Complaint   Patient presents with     RECHECK       HPI:    Jazmin Clifton  is a 86 year old (12/30/1932), who is being seen today for an episodic care visit.  HPI information obtained from: facility chart records, facility staff, patient report and [facility] chart review. Today's concern is:     Traumatic hematoma of left upper arm, initial encounter  Grief  Subacute maxillary sinusitis   Asked to see patient today to evaluate injury to left arm.  Apparently on 8/11 patient had been attempting to sit up at from lying position in bed using bed rail, when she felt sudden sharp pain to left arm.  Developed large swollen tender area, purplish in color.  X-rays done to left humerus and left shoulder on 8/12 are negative for fractures, show degenerative changes to left shoulder.  Aspirin was held today on 8/13.  Yajaira continues to report tenderness to purple area on left arm, thinks it is slightly bigger.  Is able to move arm some, does have limited range of motion at baseline due to chronic left shoulder pain.    Yajaira also reports mild tenderness to right cheek, near maxillary sinus, but also some mild edema and tenderness to right jaw, and external right ear near tragus.  Does states she feels some pressure in her ear.  States she has had a cold for the past 4 to 5 days, but it is getting better.  Previously had a sore throat now resolved.  Did have cough, which she reports is significantly improved she is afebrile.  On room air.  Able to sleep without issue at night.    Yajaira also reports feeling very sad as her roommate passed away 2 days ago.  She states they were very close and is very hard for her to be in the  apartment without her roommate.  She has requested to move to a new apartment, and her son's plan to move for this weekend.  She states she feels very sad and that she has no one she can talk to about her feelings.    Facility nurse is present during exam today.    Past Medical and Surgical History reviewed in Epic today.    MEDICATIONS:  Current Outpatient Medications   Medication Sig Dispense Refill     acetaminophen (TYLENOL) 500 MG tablet Take 1,000 mg by mouth 3 times daily       ASPIRIN LOW DOSE 81 MG chewable tablet CHEW AND SWALLOW ONE TABLET BY MOUTH ONCE DAILY 30 tablet 11     atorvastatin (LIPITOR) 40 MG tablet TAKE 1 TABLET BY MOUTH ONCE DAILY 30 tablet 98     Blood Glucose Monitoring Suppl CORY 2 times daily Call nurse for further directions if blood glucose is less than 80 or greater than 250       BREO ELLIPTA 100-25 MCG/INH inhaler INHALE 1 PUFF BY MOUTH ONCE DAILY 1 Inhaler 11     calcium carbonate (TUMS) 500 MG chewable tablet Take 1 chew tab by mouth every hour as needed for heartburn       DONEPEZIL HCL PO Take 5 mg by mouth daily       DULoxetine (CYMBALTA) 30 MG capsule Take 2 capsules (60 mg) by mouth daily AND 1 capsule (30 mg) At Bedtime. 90 capsule 11     Emollient (MOISTURIZING LOTION EX) Externally apply topically 2 times daily To bilateral lower extremeties       fluticasone (FLONASE) 50 MCG/ACT nasal spray INHALE 1 SPRAY IN EACH NOSTRIL TWICE DAILY 16 g 97     furosemide (LASIX) 40 MG tablet Take 1 tablet (40 mg) by mouth 2 times daily 60 tablet 11     gabapentin (NEURONTIN) 100 MG capsule Take 2 capsules (200 mg) by mouth 2 times daily 120 capsule 11     Hypromellose (NATURAL BALANCE TEARS OP) Apply 1 drop to eye every 6 hours as needed (to both eyes)       isradipine (DYNACIRC) 5 MG capsule Take 5 mg by mouth 2 times daily       levalbuterol (XOPENEX) 1.25 MG/3ML neb solution Take 1 ampule by nebulization every 4 hours as needed for shortness of breath / dyspnea or wheezing And every  4 hours PRN       Lidocaine (LIDOCARE) 4 % Patch Place 1 patch onto the skin daily as needed To desired area (shoulder or hip). On for 12hrs, off for 12hrs       lisinopril (PRINIVIL/ZESTRIL) 20 MG tablet Take 1 tablet (20 mg) by mouth 2 times daily 62 tablet 98     MAGNESIUM OXIDE PO Take 250 mg by mouth daily       Menthol, Topical Analgesic, (BIOFREEZE) 4 % GEL Externally apply topically 4 times daily as needed To left shoulder and right hip       nystatin (MYCOSTATIN) 454969 UNIT/GM external powder APPLY TOPICALLY TO ABDOMEN FOLDS, GROIN, AND BREASTS TWICE DAILY AS NEEDED 60 g 97     OMEPRAZOLE PO Take 40 mg by mouth every morning       oxyCODONE (OXYCONTIN) 10 MG 12 hr tablet Take 1 tablet (10 mg) by mouth every 12 hours maximum 2 tablet(s) per day 60 tablet 0     oxyCODONE (ROXICODONE) 5 MG tablet Take 1 tablet (5 mg) by mouth daily as needed for severe pain 60 tablet 0     phenazopyridine (PYRIDIUM) 100 MG tablet Take 1 tablet (100 mg) by mouth 3 times daily as needed for urinary tract discomfort 9 tablet 1     senna-docusate (SENOKOT-S/PERICOLACE) 8.6-50 MG tablet Take 1 tablet by mouth 2 times daily as needed        tiZANidine (ZANAFLEX) 2 MG tablet TAKE 1 TABLET BY MOUTH THREE TIMES DAILY AS NEEDED 90 tablet 97     vitamin D3 (CHOLECALCIFEROL) 1000 units (25 mcg) tablet Take 1 tablet (1,000 Units) by mouth daily 60 tablet 11     doxycycline hyclate (VIBRA-TABS) 100 MG tablet Take 1 tablet (100 mg) by mouth 2 times daily 14 tablet 0     naloxone (NARCAN) 4 MG/0.1ML nasal spray Spray 1 spray (4 mg) into one nostril alternating nostrils once as needed for opioid reversal q 2-3 min until responsive/EMS arrive 0.2 mL 0         REVIEW OF SYSTEMS:  4 point ROS including Respiratory, CV, GI and , other than that noted in the HPI,  is negative    Objective:  /56   Pulse 78   Temp 98  F (36.7  C)   Resp 20   Wt 87.5 kg (193 lb)   SpO2 97%   BMI 34.19 kg/m     Exam:  GENERAL APPEARANCE:  Alert, in no  distress, oriented, cooperative  ENT:  Mouth and posterior oropharynx normal, moist mucous membranes, ear canal:tender tragus , no impaction noted, no discharge noted, mild right maxilary tenderness  EYES:  EOM, conjunctivae, lids, pupils and irises normal  NECK:  mild swelling to right jawline, no adenopathy, no thyromegaly  RESP:  respiratory effort and palpation of chest normal, lungs clear to auscultation , no respiratory distress  CV:  Palpation and auscultation of heart done , regular rate and rhythm, no murmur, rub, or gallop, +2 pedal pulses, peripheral edema 2-3+ in BLE, farrow wrapsin place  ABDOMEN:  normal bowel sounds, soft, nontender, no hepatosplenomegaly or other masses  M/S:   Primarily nonambulatory, decreased range of motion to left shoulder, large area of purple discoloration, moderately hard to left bicep, tender to touch  SKIN:  Hematoma to left bicep  NEURO:   Cranial nerves 2-12 are normal tested and grossly at patient's baseline  PSYCH:  oriented X 3, normal insight, judgement and memory, sad, crying    Labs:   Recent labs in [facility] reviewed by me today.     ASSESSMENT/PLAN:  (S40.022A) Traumatic hematoma of left upper arm, initial encounter  (primary encounter diagnosis)  Comment: Appears to be hematoma to right bicep, likely popped a blood vessel during attempt to transfer.  X-rays negative.  Plan: Continue current pain regimen of Tylenol, OxyContin, tizanidine.  Ice and elevate left upper extremity as tolerated.  Monitor for change    (F43.21) Grief  Comment: Roommate recently passed away and patient was close to  Plan: Facility nurse has reached out to case coordinator as well as hospice team that had been following her roommate for options for possible support systems for Yajaira to speak to to help her through this time.  Plan to continue apartment this weekend.  Continue duloxetine 60 mg every morning and 30 mg nightly.    (J01.00) Subacute maxillary sinusitis  Comment: Likely viral,  afebrile, per patient and staff symptoms are improving.  Plan: Continue to monitor, if symptoms worsen or no improvement update provider.  Encourage appropriate fluid intake.    transcribed by : Aliyah Milan  Orders:  1. BMP - add to lab schedule for 2 weeks (Hgb, Mg)  2. Discontinue vitamin D3   3. Discontinue Phenozopyridium  4. Ice, elevate LUE PRN for comfort      Electronically signed by:  MARY Gómez CNP   \  Disclaimer:  This note consists of symbols derived from keyboarding, dictation, and/or voice recognition software.  As a result, there may be errors in the script that have gone undetected.  Please consider this when interpreting information found in the chart.            Sincerely,        MARY Gómez CNP

## 2019-08-22 ENCOUNTER — OFFICE VISIT (OUTPATIENT)
Dept: UROLOGY | Facility: CLINIC | Age: 84
End: 2019-08-22
Payer: COMMERCIAL

## 2019-08-22 VITALS
HEIGHT: 63 IN | WEIGHT: 194 LBS | DIASTOLIC BLOOD PRESSURE: 70 MMHG | SYSTOLIC BLOOD PRESSURE: 135 MMHG | BODY MASS INDEX: 34.38 KG/M2 | HEART RATE: 71 BPM

## 2019-08-22 DIAGNOSIS — N36.9 URETHRAL LESION: Primary | ICD-10-CM

## 2019-08-22 ASSESSMENT — PAIN SCALES - GENERAL: PAINLEVEL: NO PAIN (0)

## 2019-08-22 ASSESSMENT — MIFFLIN-ST. JEOR: SCORE: 1289.11

## 2019-08-22 NOTE — PROGRESS NOTES
"08/22/19     Return visit    Patient returns today for follow up following excision of urethral lesion and bladder lesion biopsy and fulguration which was performed on 6/10/19. At her last visit last month (1 month post-op) reported increased urinary frequency post-operatively with some dysuria. Was treated for a UTI at that time. Since that time she has not been having troublesome urinary symptoms - no frequency, intermittency or dysuria. Has been having severe hip pain still which was present prior     /70   Pulse 71   Ht 1.6 m (5' 3\")   Wt 88 kg (194 lb)   BMI 34.37 kg/m    She is comfortable, in no distress, non-labored breathing.  Abdomen is soft, non-tender, non-distended.  Hypospadic genitalia.  Negative CST. On exam urethra appears well healed, no gross abnormalities noted.     A/P: 86 year old F with history of a urethral and bladder lesion who is s/p resection and biopsy and fulguration of these lesions respectively. Post-operatively she reports bothersome dysuria, intermittency and frequency. Exam and symptom improvement today reassuring.     - Follow up as needed - offered to see her back in 1 year if she would like, otherwise at her discretion    Gadiel Muhammad MD  Urology Resident     Addendum:    The patient was seen and evaluated with the resident.  The plan was formulated in conjunction with me and I agree with the above note with changes made as necessary.    She is doing well from the urinary standpoint denying incontinence, pain, hematuria or other bothersome symptoms.  She will return in a year, sooner if needed    15 minutes were spent with patient today, >50% in counseling and coordination of care    Yesy Fong MD MPH   of Urology    CC  Patient Care Team:  Toño Shafer MD as MD (Family Practice)  Aliyah Milan as Medical Assistant (Gerontology)  Sarah Mccabe as Case Management Specialist (Primary Care - CC)  Laura George as Case Management Specialist " (Primary Care - CC)  Mirian Weldon LSW as Lead Care Coordinator (Primary Care - CC)  Uma Webber [facility] as Pharmacist (Pharmacist)  Lincoln Brady MD as MD (Family Practice)  Qian Gutierrez MD as MD (OB/Gyn)  Yesy Fong MD as MD (Urology)  Maranda Bautista APRN CNP as Referring Physician (Nurse Practitioner - Gerontology)  Tara Silva, SEVERO as Specialty Care Coordinator (Urology)  Maranda Bautista APRN CNP as Assigned PCP  MARANDA BAUTISTA

## 2019-08-22 NOTE — NURSING NOTE
"Chief Complaint   Patient presents with     RECHECK     post op       Blood pressure 135/70, pulse 71, height 1.6 m (5' 3\"), weight 88 kg (194 lb). Body mass index is 34.37 kg/m .    Patient Active Problem List   Diagnosis     Atherosclerosis of renal artery (H)     Esophageal reflux     Cerebral aneurysm, nonruptured     Other specified cardiac dysrhythmias(427.89)     Essential hypertension, benign     Congenital cystic kidney disease     Benign neoplasm of colon     Renovascular hypertension     CHF (congestive heart failure) (H)     Restrictive lung disease     CARDIOVASCULAR SCREENING; LDL GOAL LESS THAN 100     Osteoporosis     S/P laminectomy     Hip joint replacement status     Osteoarthritis     DDD (degenerative disc disease), lumbar     Mild major depression (H)     Incontinence of urine     Chronic obstructive pulmonary disease, unspecified COPD type (H)     Generalized muscle weakness     Dizziness     Osteoarthritis of right hip, unspecified osteoarthritis type     CVA (cerebral vascular accident) (H)     Transient cerebral ischemia, unspecified type     CKD (chronic kidney disease) stage 3, GFR 30-59 ml/min (H)     Thyroid nodule     Trigger point of extremity     Trochanteric bursitis of right hip     Carotid stenosis, symptomatic w/o infarct, right     S/P carotid endarterectomy     Mild cognitive impairment     Morbid obesity (H)     Health Care Home     Shortness of breath     COPD exacerbation (H)     Weakness     Acute kidney injury (H)     Alzheimer's dementia without behavioral disturbance     Anaclitic depression     BPPV (benign paroxysmal positional vertigo)     Chronic diastolic congestive heart failure (H)     Constipation     Dyspepsia     Female stress incontinence     Hip pain     History of bradycardia     History of DVT (deep vein thrombosis)     History of herpes zoster     History of intracranial aneurysm     History of stroke     Hyperlipidemia     Impaired ambulation     Multiple " closed fractures of metatarsal bone     Normocytic anemia     Postherpetic neuralgia     Rectocele     Retention of urine     Right groin mass     Spinal stenosis of lumbar region     Spondylolisthesis, lumbar region     Umbilical hernia without obstruction and without gangrene     Anemia of chronic renal failure, stage 4 (severe) (H)     Mass of urethra     Gross hematuria     Post-operative state       Allergies   Allergen Reactions     Accupril      Ace Inhibitors Unknown     Accupril     Augmentin Unknown     Blood-Group Specific Substance      Other reaction(s): *Unknown  Patient has a Non-specific antibody. Blood Product orders may be delayed.  Draw one red top and two purple top tubes for ALL Type and Screen/ Type and Crossmatch orders.      Levofloxacin Unknown, Muscle Pain (Myalgia) and Other (See Comments)     Comment: myalgias, Description:   Pt prescribed ciprofloxacin in Jan 2018 (no rxn documented)  Myalgias       Morphine Other (See Comments)     hallucinations     Nitrofurantoin Nausea and Vomiting and Unknown     Norvasc [Amlodipine Besylate]      Leg swelling       Quinapril Other (See Comments) and Unknown     Hypertension. 3.29.18 - Takes and tolerates lisinopril  Patient reports HTN with as reaction to this medication         Current Outpatient Medications   Medication Sig Dispense Refill     acetaminophen (TYLENOL) 500 MG tablet Take 1,000 mg by mouth 3 times daily       ASPIRIN LOW DOSE 81 MG chewable tablet CHEW AND SWALLOW ONE TABLET BY MOUTH ONCE DAILY 30 tablet 11     atorvastatin (LIPITOR) 40 MG tablet TAKE 1 TABLET BY MOUTH ONCE DAILY 30 tablet 98     Blood Glucose Monitoring Suppl CORY 2 times daily Call nurse for further directions if blood glucose is less than 80 or greater than 250       BREO ELLIPTA 100-25 MCG/INH inhaler INHALE 1 PUFF BY MOUTH ONCE DAILY 1 Inhaler 11     calcium carbonate (TUMS) 500 MG chewable tablet Take 1 chew tab by mouth every hour as needed for heartburn        DONEPEZIL HCL PO Take 5 mg by mouth daily       doxycycline hyclate (VIBRA-TABS) 100 MG tablet Take 1 tablet (100 mg) by mouth 2 times daily 14 tablet 0     DULoxetine (CYMBALTA) 30 MG capsule Take 2 capsules (60 mg) by mouth daily AND 1 capsule (30 mg) At Bedtime. 90 capsule 11     Emollient (MOISTURIZING LOTION EX) Externally apply topically 2 times daily To bilateral lower extremeties       fluticasone (FLONASE) 50 MCG/ACT nasal spray INHALE 1 SPRAY IN EACH NOSTRIL TWICE DAILY 16 g 97     furosemide (LASIX) 40 MG tablet Take 1 tablet (40 mg) by mouth 2 times daily 60 tablet 11     gabapentin (NEURONTIN) 100 MG capsule Take 2 capsules (200 mg) by mouth 2 times daily 120 capsule 11     Hypromellose (NATURAL BALANCE TEARS OP) Apply 1 drop to eye every 6 hours as needed (to both eyes)       isradipine (DYNACIRC) 5 MG capsule Take 5 mg by mouth 2 times daily       levalbuterol (XOPENEX) 1.25 MG/3ML neb solution Take 1 ampule by nebulization every 4 hours as needed for shortness of breath / dyspnea or wheezing And every 4 hours PRN       Lidocaine (LIDOCARE) 4 % Patch Place 1 patch onto the skin daily as needed To desired area (shoulder or hip). On for 12hrs, off for 12hrs       lisinopril (PRINIVIL/ZESTRIL) 20 MG tablet Take 1 tablet (20 mg) by mouth 2 times daily 62 tablet 98     MAGNESIUM OXIDE PO Take 250 mg by mouth daily       Menthol, Topical Analgesic, (BIOFREEZE) 4 % GEL Externally apply topically 4 times daily as needed To left shoulder and right hip       naloxone (NARCAN) 4 MG/0.1ML nasal spray Spray 1 spray (4 mg) into one nostril alternating nostrils once as needed for opioid reversal q 2-3 min until responsive/EMS arrive 0.2 mL 0     nystatin (MYCOSTATIN) 376965 UNIT/GM external powder APPLY TOPICALLY TO ABDOMEN FOLDS, GROIN, AND BREASTS TWICE DAILY AS NEEDED 60 g 97     OMEPRAZOLE PO Take 40 mg by mouth every morning       oxyCODONE (OXYCONTIN) 10 MG 12 hr tablet Take 1 tablet (10 mg) by mouth every  12 hours maximum 2 tablet(s) per day 60 tablet 0     oxyCODONE (ROXICODONE) 5 MG tablet Take 1 tablet (5 mg) by mouth daily as needed for severe pain 60 tablet 0     phenazopyridine (PYRIDIUM) 100 MG tablet Take 1 tablet (100 mg) by mouth 3 times daily as needed for urinary tract discomfort 9 tablet 1     senna-docusate (SENOKOT-S/PERICOLACE) 8.6-50 MG tablet Take 1 tablet by mouth 2 times daily as needed        tiZANidine (ZANAFLEX) 2 MG tablet TAKE 1 TABLET BY MOUTH THREE TIMES DAILY AS NEEDED 90 tablet 97     vitamin D3 (CHOLECALCIFEROL) 1000 units (25 mcg) tablet Take 1 tablet (1,000 Units) by mouth daily 60 tablet 11       Social History     Tobacco Use     Smoking status: Former Smoker     Last attempt to quit: 1992     Years since quittin.6     Smokeless tobacco: Never Used   Substance Use Topics     Alcohol use: Yes     Comment: wine occas.     Drug use: No       Dinorah Foster CMA, PIPER  2019  9:13 AM

## 2019-08-22 NOTE — LETTER
"8/22/2019       RE: Jazmin Krishnamurthy [place] [address]  [address]  [facility] [address]     Dear Colleague,    Thank you for referring your patient, Jazmin Clifton, to the [facility] AND INST FOR PROSTATE AND UROLOGIC CANCERS at [facility]. Please see a copy of my visit note below.    08/22/19     Return visit    Patient returns today for follow up following excision of urethral lesion and bladder lesion biopsy and fulguration which was performed on 6/10/19. At her last visit last month (1 month post-op) reported increased urinary frequency post-operatively with some dysuria. Was treated for a UTI at that time. Since that time she has not been having troublesome urinary symptoms - no frequency, intermittency or dysuria. Has been having severe hip pain still which was present prior     /70   Pulse 71   Ht 1.6 m (5' 3\")   Wt 88 kg (194 lb)   BMI 34.37 kg/m     She is comfortable, in no distress, non-labored breathing.  Abdomen is soft, non-tender, non-distended.  Hypospadic genitalia.  Negative CST. On exam urethra appears well healed, no gross abnormalities noted.     A/P: 86 year old F with history of a urethral and bladder lesion who is s/p resection and biopsy and fulguration of these lesions respectively. Post-operatively she reports bothersome dysuria, intermittency and frequency. Exam and symptom improvement today reassuring.     - Follow up as needed - offered to see her back in 1 year if she would like, otherwise at her discretion    Gadiel Muhammad MD  Urology Resident     Addendum:    The patient was seen and evaluated with the resident.  The plan was formulated in conjunction with me and I agree with the above note with changes made as necessary.    She is doing well from the urinary standpoint denying incontinence, pain, hematuria or other bothersome symptoms.  She will return in a year, sooner if needed    15 minutes were spent with patient " today, >50% in counseling and coordination of care    Yesy Fong MD MPH   of Urology    CC  Patient Care Team:  Toño Shafer MD as MD (Family Practice)  Aliyah Milan as Medical Assistant (Gerontology)  Sarah Mccabe as Case Management Specialist (Primary Care - CC)  Laura George as Case Management Specialist (Primary Care - CC)  Mirian Weldon LSW as Lead Care Coordinator (Primary Care - CC)  Uma Webber [facility] as Pharmacist (Pharmacist)  Lincoln Brady MD as MD (Family Practice)  Qian Gutierrez MD as MD (OB/Gyn)  Yesy Fong MD as MD (Urology)  Junie Estrella APRN CNP as Referring Physician (Nurse Practitioner - Gerontology)  Tara Silva, SEVERO as Specialty Care Coordinator (Urology)

## 2019-08-23 ENCOUNTER — PATIENT OUTREACH (OUTPATIENT)
Dept: GERIATRIC MEDICINE | Facility: CLINIC | Age: 84
End: 2019-08-23

## 2019-08-23 NOTE — PATIENT INSTRUCTIONS
Return to see us in 1 year, sooner if needed    It was a pleasure meeting with you today.  Thank you for allowing me and my team the privilege of caring for you today.  YOU are the reason we are here, and I truly hope we provided you with the excellent service you deserve.  Please let us know if there is anything else we can do for you so that we can be sure you are leaving completely satisfied with your care experience.

## 2019-08-23 NOTE — PROGRESS NOTES
8   CC f/u with facility RN to see how member was doing after her roommate .  Brianna stated she is doing fine and then stated that member had moved to another apartment which has helped.  CC did not know member had changed apartment so [id number] sent to financial worker and CMS to update Access.   Mirian Weldon MA [facility] Care Coordinator   [phone]

## 2019-08-29 ENCOUNTER — HOSPITAL LABORATORY (OUTPATIENT)
Facility: OTHER | Age: 84
End: 2019-08-29

## 2019-08-29 LAB
HGB BLD-MCNC: 12.3 G/DL (ref 11.7–15.7)
MAGNESIUM SERPL-MCNC: 2.2 MG/DL (ref 1.6–2.3)

## 2019-09-01 ENCOUNTER — APPOINTMENT (OUTPATIENT)
Dept: GENERAL RADIOLOGY | Facility: CLINIC | Age: 84
DRG: 154 | End: 2019-09-01
Attending: EMERGENCY MEDICINE
Payer: COMMERCIAL

## 2019-09-01 ENCOUNTER — APPOINTMENT (OUTPATIENT)
Dept: CT IMAGING | Facility: CLINIC | Age: 84
DRG: 154 | End: 2019-09-01
Attending: EMERGENCY MEDICINE
Payer: COMMERCIAL

## 2019-09-01 ENCOUNTER — HOSPITAL ENCOUNTER (INPATIENT)
Facility: CLINIC | Age: 84
LOS: 5 days | Discharge: NURSING, REHAB OR LONG-TERM CARE | DRG: 154 | End: 2019-09-06
Attending: EMERGENCY MEDICINE | Admitting: FAMILY MEDICINE
Payer: COMMERCIAL

## 2019-09-01 DIAGNOSIS — R11.2 NON-INTRACTABLE VOMITING WITH NAUSEA, UNSPECIFIED VOMITING TYPE: ICD-10-CM

## 2019-09-01 DIAGNOSIS — N30.00 ACUTE CYSTITIS WITHOUT HEMATURIA: ICD-10-CM

## 2019-09-01 DIAGNOSIS — G89.4 CHRONIC PAIN SYNDROME: ICD-10-CM

## 2019-09-01 DIAGNOSIS — M62.81 GENERALIZED MUSCLE WEAKNESS: ICD-10-CM

## 2019-09-01 DIAGNOSIS — K11.20 SIALADENITIS: Primary | ICD-10-CM

## 2019-09-01 DIAGNOSIS — M51.369 DDD (DEGENERATIVE DISC DISEASE), LUMBAR: ICD-10-CM

## 2019-09-01 DIAGNOSIS — K11.5 SIALOLITHIASIS OF SUBMANDIBULAR GLAND: ICD-10-CM

## 2019-09-01 LAB
ALBUMIN SERPL-MCNC: 3.6 G/DL (ref 3.4–5)
ALBUMIN UR-MCNC: NEGATIVE MG/DL
ALP SERPL-CCNC: 87 U/L (ref 40–150)
ALT SERPL W P-5'-P-CCNC: 16 U/L (ref 0–50)
ANION GAP SERPL CALCULATED.3IONS-SCNC: 5 MMOL/L (ref 3–14)
APPEARANCE UR: CLEAR
AST SERPL W P-5'-P-CCNC: 15 U/L (ref 0–45)
BACTERIA #/AREA URNS HPF: ABNORMAL /HPF
BASOPHILS # BLD AUTO: 0 10E9/L (ref 0–0.2)
BASOPHILS NFR BLD AUTO: 0.4 %
BILIRUB SERPL-MCNC: 0.6 MG/DL (ref 0.2–1.3)
BILIRUB UR QL STRIP: NEGATIVE
BUN SERPL-MCNC: 30 MG/DL (ref 7–30)
CALCIUM SERPL-MCNC: 9.8 MG/DL (ref 8.5–10.1)
CHLORIDE SERPL-SCNC: 106 MMOL/L (ref 94–109)
CO2 SERPL-SCNC: 30 MMOL/L (ref 20–32)
COLOR UR AUTO: YELLOW
CREAT SERPL-MCNC: 1.42 MG/DL (ref 0.52–1.04)
DIFFERENTIAL METHOD BLD: ABNORMAL
EOSINOPHIL # BLD AUTO: 0.2 10E9/L (ref 0–0.7)
EOSINOPHIL NFR BLD AUTO: 1.8 %
ERYTHROCYTE [DISTWIDTH] IN BLOOD BY AUTOMATED COUNT: 13.8 % (ref 10–15)
GFR SERPL CREATININE-BSD FRML MDRD: 33 ML/MIN/{1.73_M2}
GLUCOSE SERPL-MCNC: 101 MG/DL (ref 70–99)
GLUCOSE UR STRIP-MCNC: NEGATIVE MG/DL
HCT VFR BLD AUTO: 36.1 % (ref 35–47)
HGB BLD-MCNC: 11.2 G/DL (ref 11.7–15.7)
HGB UR QL STRIP: NEGATIVE
IMM GRANULOCYTES # BLD: 0 10E9/L (ref 0–0.4)
IMM GRANULOCYTES NFR BLD: 0.1 %
KETONES UR STRIP-MCNC: NEGATIVE MG/DL
LACTATE BLD-SCNC: 1 MMOL/L (ref 0.7–2)
LEUKOCYTE ESTERASE UR QL STRIP: ABNORMAL
LIPASE SERPL-CCNC: 62 U/L (ref 73–393)
LYMPHOCYTES # BLD AUTO: 1.2 10E9/L (ref 0.8–5.3)
LYMPHOCYTES NFR BLD AUTO: 14.4 %
MCH RBC QN AUTO: 30.1 PG (ref 26.5–33)
MCHC RBC AUTO-ENTMCNC: 31 G/DL (ref 31.5–36.5)
MCV RBC AUTO: 97 FL (ref 78–100)
MONOCYTES # BLD AUTO: 0.7 10E9/L (ref 0–1.3)
MONOCYTES NFR BLD AUTO: 8.7 %
MUCOUS THREADS #/AREA URNS LPF: PRESENT /LPF
NEUTROPHILS # BLD AUTO: 6.1 10E9/L (ref 1.6–8.3)
NEUTROPHILS NFR BLD AUTO: 74.6 %
NITRATE UR QL: NEGATIVE
NRBC # BLD AUTO: 0 10*3/UL
NRBC BLD AUTO-RTO: 0 /100
PH UR STRIP: 6 PH (ref 5–7)
PLATELET # BLD AUTO: 210 10E9/L (ref 150–450)
POTASSIUM SERPL-SCNC: 3.7 MMOL/L (ref 3.4–5.3)
PROT SERPL-MCNC: 6.9 G/DL (ref 6.8–8.8)
RBC # BLD AUTO: 3.72 10E12/L (ref 3.8–5.2)
RBC #/AREA URNS AUTO: <1 /HPF (ref 0–2)
SODIUM SERPL-SCNC: 141 MMOL/L (ref 133–144)
SOURCE: ABNORMAL
SP GR UR STRIP: 1.01 (ref 1–1.03)
SQUAMOUS #/AREA URNS AUTO: 1 /HPF (ref 0–1)
TROPONIN I SERPL-MCNC: <0.015 UG/L (ref 0–0.04)
UROBILINOGEN UR STRIP-MCNC: 0 MG/DL (ref 0–2)
WBC # BLD AUTO: 8.2 10E9/L (ref 4–11)
WBC #/AREA URNS AUTO: 34 /HPF (ref 0–5)

## 2019-09-01 PROCEDURE — 80053 COMPREHEN METABOLIC PANEL: CPT | Performed by: EMERGENCY MEDICINE

## 2019-09-01 PROCEDURE — 87086 URINE CULTURE/COLONY COUNT: CPT | Performed by: EMERGENCY MEDICINE

## 2019-09-01 PROCEDURE — 12000000 ZZH R&B MED SURG/OB

## 2019-09-01 PROCEDURE — 83690 ASSAY OF LIPASE: CPT | Performed by: EMERGENCY MEDICINE

## 2019-09-01 PROCEDURE — 93005 ELECTROCARDIOGRAM TRACING: CPT

## 2019-09-01 PROCEDURE — 84484 ASSAY OF TROPONIN QUANT: CPT | Performed by: EMERGENCY MEDICINE

## 2019-09-01 PROCEDURE — 70490 CT SOFT TISSUE NECK W/O DYE: CPT

## 2019-09-01 PROCEDURE — 99285 EMERGENCY DEPT VISIT HI MDM: CPT | Mod: 25

## 2019-09-01 PROCEDURE — 25000128 H RX IP 250 OP 636: Performed by: EMERGENCY MEDICINE

## 2019-09-01 PROCEDURE — 93010 ELECTROCARDIOGRAM REPORT: CPT | Mod: Z6 | Performed by: EMERGENCY MEDICINE

## 2019-09-01 PROCEDURE — 83605 ASSAY OF LACTIC ACID: CPT | Performed by: EMERGENCY MEDICINE

## 2019-09-01 PROCEDURE — 96375 TX/PRO/DX INJ NEW DRUG ADDON: CPT

## 2019-09-01 PROCEDURE — 73502 X-RAY EXAM HIP UNI 2-3 VIEWS: CPT

## 2019-09-01 PROCEDURE — 87040 BLOOD CULTURE FOR BACTERIA: CPT | Performed by: EMERGENCY MEDICINE

## 2019-09-01 PROCEDURE — 25000125 ZZHC RX 250: Performed by: EMERGENCY MEDICINE

## 2019-09-01 PROCEDURE — 85025 COMPLETE CBC W/AUTO DIFF WBC: CPT | Performed by: EMERGENCY MEDICINE

## 2019-09-01 PROCEDURE — 96374 THER/PROPH/DIAG INJ IV PUSH: CPT

## 2019-09-01 PROCEDURE — 96361 HYDRATE IV INFUSION ADD-ON: CPT

## 2019-09-01 PROCEDURE — 81001 URINALYSIS AUTO W/SCOPE: CPT | Performed by: EMERGENCY MEDICINE

## 2019-09-01 PROCEDURE — 99285 EMERGENCY DEPT VISIT HI MDM: CPT | Mod: 25 | Performed by: EMERGENCY MEDICINE

## 2019-09-01 PROCEDURE — 25800030 ZZH RX IP 258 OP 636: Performed by: EMERGENCY MEDICINE

## 2019-09-01 RX ORDER — ONDANSETRON 2 MG/ML
4 INJECTION INTRAMUSCULAR; INTRAVENOUS ONCE
Status: COMPLETED | OUTPATIENT
Start: 2019-09-01 | End: 2019-09-01

## 2019-09-01 RX ORDER — IOPAMIDOL 755 MG/ML
80 INJECTION, SOLUTION INTRAVASCULAR ONCE
Status: DISCONTINUED | OUTPATIENT
Start: 2019-09-01 | End: 2019-09-01 | Stop reason: CLARIF

## 2019-09-01 RX ORDER — CEFTRIAXONE SODIUM 1 G/50ML
1 INJECTION, SOLUTION INTRAVENOUS ONCE
Status: COMPLETED | OUTPATIENT
Start: 2019-09-01 | End: 2019-09-01

## 2019-09-01 RX ORDER — ONDANSETRON 4 MG/1
4 TABLET, ORALLY DISINTEGRATING ORAL EVERY 6 HOURS PRN
Status: DISCONTINUED | OUTPATIENT
Start: 2019-09-01 | End: 2019-09-02

## 2019-09-01 RX ORDER — ONDANSETRON 2 MG/ML
4 INJECTION INTRAMUSCULAR; INTRAVENOUS EVERY 6 HOURS PRN
Status: DISCONTINUED | OUTPATIENT
Start: 2019-09-01 | End: 2019-09-02

## 2019-09-01 RX ORDER — NALOXONE HYDROCHLORIDE 0.4 MG/ML
.1-.4 INJECTION, SOLUTION INTRAMUSCULAR; INTRAVENOUS; SUBCUTANEOUS
Status: DISCONTINUED | OUTPATIENT
Start: 2019-09-01 | End: 2019-09-02

## 2019-09-01 RX ORDER — ACETAMINOPHEN 325 MG/1
650 TABLET ORAL EVERY 4 HOURS PRN
Status: DISCONTINUED | OUTPATIENT
Start: 2019-09-01 | End: 2019-09-06 | Stop reason: HOSPADM

## 2019-09-01 RX ORDER — CLINDAMYCIN PHOSPHATE 900 MG/50ML
900 INJECTION, SOLUTION INTRAVENOUS EVERY 8 HOURS
Status: DISCONTINUED | OUTPATIENT
Start: 2019-09-01 | End: 2019-09-04

## 2019-09-01 RX ORDER — ACETAMINOPHEN 325 MG/1
650 TABLET ORAL ONCE
Status: COMPLETED | OUTPATIENT
Start: 2019-09-01 | End: 2019-09-02

## 2019-09-01 RX ADMIN — SODIUM CHLORIDE, POTASSIUM CHLORIDE, SODIUM LACTATE AND CALCIUM CHLORIDE 1000 ML: 600; 310; 30; 20 INJECTION, SOLUTION INTRAVENOUS at 18:32

## 2019-09-01 RX ADMIN — CLINDAMYCIN IN 5 PERCENT DEXTROSE 900 MG: 18 INJECTION, SOLUTION INTRAVENOUS at 21:56

## 2019-09-01 RX ADMIN — CEFTRIAXONE SODIUM 1 G: 1 INJECTION, SOLUTION INTRAVENOUS at 23:07

## 2019-09-01 RX ADMIN — ONDANSETRON 4 MG: 2 INJECTION INTRAMUSCULAR; INTRAVENOUS at 19:50

## 2019-09-01 ASSESSMENT — ACTIVITIES OF DAILY LIVING (ADL)
AMBULATION: 1-->ASSISTIVE EQUIPMENT
DRESS: 1-->ASSISTIVE EQUIPMENT
SWALLOWING: 0-->SWALLOWS FOODS/LIQUIDS WITHOUT DIFFICULTY
RETIRED_COMMUNICATION: 0-->UNDERSTANDS/COMMUNICATES WITHOUT DIFFICULTY
RETIRED_EATING: 0-->INDEPENDENT
TRANSFERRING: 0-->INDEPENDENT
FALL_HISTORY_WITHIN_LAST_SIX_MONTHS: YES
BATHING: 3-->ASSISTIVE EQUIPMENT AND PERSON
WHICH_OF_THE_ABOVE_FUNCTIONAL_RISKS_HAD_A_RECENT_ONSET_OR_CHANGE?: AMBULATION;TRANSFERRING;TOILETING;BATHING;DRESSING
COGNITION: 0 - NO COGNITION ISSUES REPORTED
NUMBER_OF_TIMES_PATIENT_HAS_FALLEN_WITHIN_LAST_SIX_MONTHS: 1
TOILETING: 1-->ASSISTIVE EQUIPMENT

## 2019-09-01 ASSESSMENT — MIFFLIN-ST. JEOR: SCORE: 1282.13

## 2019-09-01 NOTE — LETTER
"Transition Communication Hand-off for Care Transitions to Next Level of Care Provider    Name: Jazmin Clifton  : 1932  MRN #: [id number]  Primary Care Provider: Junie Estrella  Primary Care MD Name: (Delores)  Primary Clinic: [address] [place] [id number]  Primary Care Clinic Name: ([facility])  Reason for Hospitalization:  Generalized muscle weakness [M62.81]  Sialolithiasis of submandibular gland [K11.5]  Acute cystitis without hematuria [N30.00]  Non-intractable vomiting with nausea, unspecified vomiting type [R11.2]  Admit Date/Time: 2019  6:11 PM  Discharge Date: ***  Payor Source: Payor: MEDICA / Plan: [employer] [facility] [employer] / Product Type: HMO /     Readmission Assessment Measure (LINN) Risk Score/category: ***    Plan of Care Goals/Milestone Events:   Patient Concerns: ***   Patient Goals:   Short-term ***   Long-term ***   Medical Goals   Short-term ***   Long-term ***         Reason for Communication Hand-off Referral: {CCREASONCMCTNHANDOFFREFERRALCTS:42451}    Discharge Plan:       Concern for non-adherence with plan of care:   Y/N ***  Discharge Needs Assessment:  Needs      Most Recent Value   Equipment Currently Used at Home  walker, rolling, other (see comments) [motorized scooter]          Already enrolled in Tele-monitoring program and name of program:  ***  Follow-up specialty is recommended: {YES / NO:38378::\"Yes\"}    Follow-up plan:    Future Appointments   Date Time Provider Department Center   2019  9:30 AM Ole Mendoza MD [facility]       Any outstanding tests or procedures:    Procedures     Future Labs/Procedures    Oxygen - Nasal cannula     Comments:    2 Lpm by nasal cannula to keep O2 sats 90 % or greater.          Referrals     Future Labs/Procedures    Occupational Therapy Adult Consult     Comments:    Evaluate and treat as clinically indicated.    Reason:  weak    OTOLARYNGOLOGY REFERRAL     Comments:    Your provider " has referred you to: [facility]: [facility] - [place] [phone]   [email or web address]  [facility] - [place] [phone]  [email or web address]    Please be aware that coverage of these services is subject to the terms and limitations of your health insurance plan.  Call member services at your health plan with any benefit or coverage questions.      Please bring the following with you to your appointment:    (1) Any X-Rays, CTs or MRIs which have been performed.  Contact the facility where they were done to arrange for  prior to your scheduled appointment.   (2) List of current medications  (3) This referral request   (4) Any documents/labs given to you for this referral    Physical Therapy Adult Consult     Comments:    Evaluate and treat as clinically indicated.    Reason:  weak            Key Recommendations:      Hali Yi    AVS/Discharge Summary is the source of truth; this is a helpful guide for improved communication of patient story

## 2019-09-01 NOTE — ED PROVIDER NOTES
"  History     Chief Complaint   Patient presents with     Generalized Weakness     feels \"sick\"     HPI  Jazmin Clifton is a 86 year old female with a complex past medical history which includes hypertension, CKD stage III, COPD, cerebral aneurysm status post repair, CAD, mild cognitive impairment, who presents for evaluation of nausea, vomiting, and generalized weakness of 3 days duration.  Of note she also reports swelling in the left side of her neck which she has has been present for 3 to 4 days.  Also complains of mild abdominal discomfort across her lower abdomen bilaterally.  She says her vomiting is nonbloody, nonbilious, has been occurring 3-4 times per day.  Has not been able to keep any solid food down at all today.  Last bowel movement today, one episode of diarrhea yesterday.  States she had felt flushed, and diaphoretic earlier today.  Was told she does not have a measured temperature from her care facility.  Denies any chest pain, shortness of breath, difficulty or discomfort with swallowing, difficulty opening her mouth, sensation of tightness in her neck or throat.     The patient's PMHx, Surgical Hx, Allergies, and Medications were all reviewed with the patient.    Allergies:  Allergies   Allergen Reactions     Accupril      Ace Inhibitors Unknown     Accupril     Augmentin Unknown     Blood-Group Specific Substance      Other reaction(s): *Unknown  Patient has a Non-specific antibody. Blood Product orders may be delayed.  Draw one red top and two purple top tubes for ALL Type and Screen/ Type and Crossmatch orders.      Levofloxacin Unknown, Muscle Pain (Myalgia) and Other (See Comments)     Comment: myalgias, Description:   Pt prescribed ciprofloxacin in Jan 2018 (no rxn documented)  Myalgias       Morphine Other (See Comments)     hallucinations     Nitrofurantoin Nausea and Vomiting and Unknown     Norvasc [Amlodipine Besylate]      Leg swelling       Quinapril Other (See Comments) and Unknown "     Hypertension. 3.29.18 - Takes and tolerates lisinopril  Patient reports HTN with as reaction to this medication         Problem List:    Patient Active Problem List    Diagnosis Date Noted     Sialadenitis 2019     Priority: Medium     Post-operative state 06/10/2019     Priority: Medium     Mass of urethra 2019     Priority: Medium     Gross hematuria 2019     Priority: Medium     Anemia of chronic renal failure, stage 4 (severe) (H) 2019     Priority: Medium     COPD exacerbation (H) 05/10/2019     Priority: Medium     Weakness 05/10/2019     Priority: Medium     Shortness of breath 2019     Priority: Medium     Health Care Home 2019     Priority: Medium     [facility] Care Coordinator  Mirian Weldon MA, LSW  [phone]    [facility] CARE PLAN SUMMARY    Member Name:  Jazmin STAHL Etienne    Address:  [address] [place] [id number]    As of 19 (moved to another apt)  Phone: [phone] (home)    :  1932 Date of Assessment: 19  Change in condition visit    Health Plan:  [employer] [facility]  Health Plan Number: [id number] Medical Assistance Number: [id number]  Financial Worker:  [facility]   Case #:     FVP Care Coordinator:  Mirian Weldon MA, LSW CC Phone:  [phone]  CC Fax:  [phone]   P Enrollment Date: 19 Case Mix:  D  Rate Cell:  E  Waiver Type: EW     Primary Emergency Contact:  Chi Clifton  Address: [address] [place] [address]  Mobile Phone: [phone]  Relation: Son    Secondary Emergency Contact: Lucius Clifton  Address: [address] [address] [address]   Home Phone: [phone]  Work Phone: [phone]  Relation: Son Language:  English  :  No   Health Care Agent/POA:   Advanced Directives/Living Will:     Primary Care Clinic/Phone/Fax:  [facility]/(p) [phone], (f) [phone] Primary Dx:  COPD J44.9  Secondary Dx: DDD  "M51.36  Cognitive Impairment G31.84   Primary Physician:  Junie Estrella   Height:  5' 3\"  Weight:  203 lbs   Specialty Physician:    Audiologist:     Eye Care Provider:   Dental Care Provider:  [facility]   [facility]: [facility] [phone]   Other:        [facility] CURRENT SERVICES SUMMARY  Equipment owned/DME history:  Member son purchased electric wheelchair for member;    Member has scooter, lift chair, 3 wheeled walker   SERVICE TYPE/PROVIDER NAME/PHONE AUTH DATE FREQUENCY Units OR $ Amt DESCRIPTION   Medical Transportation: [facility] [phone]  Fax:  4-1-19 thru 3-30-20 review annually/PRN  as needed     Assisted Living: [facility] [place] (Assisted Living) [phone] 24 hr [facility]  Fax:  4-1-19 thru 3-30-20 review annually/PRN daily See RS/AL Tool      Supplies: [facility] [phone]  Fax:  4-1-19 thru 3-30-20 review annually/PRN   monthly 1 pull up per day   1 liner per day  XL pull ups     Regular liners      Abraham ESPARZA    [phone]   5-1-19 thru 3-30-20  as needed  Declined 6-1-19                    Acute kidney injury (H) 01/11/2019     Priority: Medium     Hip pain 10/29/2018     Priority: Medium     Impaired ambulation 10/29/2018     Priority: Medium     Morbid obesity (H) 06/19/2018     Priority: Medium     Multiple closed fractures of metatarsal bone 05/28/2018     Priority: Medium     Right groin mass 05/03/2018     Priority: Medium     Chronic diastolic congestive heart failure (H) 02/02/2018     Priority: Medium     History of stroke 02/02/2018     Priority: Medium     Mild cognitive impairment 09/22/2017     Priority: Medium     S/P carotid endarterectomy 09/06/2017     Priority: Medium     Underwent for symptomatic right carotid artery stenosis        Carotid stenosis, symptomatic w/o infarct, right 08/31/2017     Priority: Medium     Thyroid nodule 08/23/2017     Priority: Medium     Trigger point of extremity " 08/23/2017     Priority: Medium     Trochanteric bursitis of right hip 08/23/2017     Priority: Medium     CKD (chronic kidney disease) stage 3, GFR 30-59 ml/min (H) 08/16/2017     Priority: Medium     Transient cerebral ischemia, unspecified type 08/01/2017     Priority: Medium     CVA (cerebral vascular accident) (H) 07/26/2017     Priority: Medium     Chronic obstructive pulmonary disease, unspecified COPD type (H) 07/25/2017     Priority: Medium     Generalized muscle weakness 07/25/2017     Priority: Medium     Dizziness 07/25/2017     Priority: Medium     Osteoarthritis of right hip, unspecified osteoarthritis type 07/25/2017     Priority: Medium     Alzheimer's dementia without behavioral disturbance 05/12/2017     Priority: Medium     Retention of urine 04/15/2017     Priority: Medium     History of intracranial aneurysm 04/14/2017     Priority: Medium     Postherpetic neuralgia 11/14/2016     Priority: Medium     Umbilical hernia without obstruction and without gangrene 06/03/2016     Priority: Medium     Spinal stenosis of lumbar region 01/11/2016     Priority: Medium     Spondylolisthesis, lumbar region 01/11/2016     Priority: Medium     BPPV (benign paroxysmal positional vertigo) 11/27/2014     Priority: Medium     Dyspepsia 11/27/2014     Priority: Medium     Female stress incontinence 11/27/2014     Priority: Medium     History of bradycardia 11/27/2014     Priority: Medium     History of DVT (deep vein thrombosis) 11/27/2014     Priority: Medium     History of herpes zoster 11/27/2014     Priority: Medium     Constipation 10/20/2012     Priority: Medium     Rectocele 08/31/2012     Priority: Medium     Hip joint replacement status 04/18/2012     Priority: Medium     Osteoarthritis 04/18/2012     Priority: Medium     DDD (degenerative disc disease), lumbar 04/18/2012     Priority: Medium     Mild major depression (H) 04/18/2012     Priority: Medium     Incontinence of urine 04/18/2012     Priority:  Medium     Hyperlipidemia 12/07/2011     Priority: Medium     Osteoporosis 10/25/2011     Priority: Medium     S/P laminectomy 10/25/2011     Priority: Medium     CARDIOVASCULAR SCREENING; LDL GOAL LESS THAN 100 10/31/2010     Priority: Medium     Normocytic anemia 02/17/2010     Priority: Medium     CHF (congestive heart failure) (H) 09/17/2008     Priority: Medium     Diastolic disfunction - ECHP 2008       Restrictive lung disease 09/17/2008     Priority: Medium     PFT 4/2008       Renovascular hypertension 11/09/2006     Priority: Medium     Problem list name updated by automated process. Provider to review       Benign neoplasm of colon 10/04/2006     Priority: Medium     Angiodysplasia - due for repeat 10 years.  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 NO edema. Continue current Therapy.HYPERTENSION: Dynacirc CR 10mg daily initiated today. Will continue adjusting blood pressure medicatins as needed until readings at target.VITAMIN D  DEFICIENCY - Completed therapy, discontinue ergocalciferol.ANEMIA, EPO DEFICIENCY - Hgb 10.2. Not on EPO. Continue observation for now. Recnet Hgb drop due to lumbar laminectomy.  Problem list name updated by automated process. Provider to review       Essential hypertension, benign 05/01/2006     Priority: Medium     Atherosclerosis of renal artery (H)      Priority: Medium     Left renal artery stenosis       Esophageal reflux      Priority: Medium     Gastroesophageal Reflux Disease       Cerebral aneurysm, nonruptured      Priority: Medium     Cerebral Aneurysm - unchanged on rcent MRI.  Seen by  neurosurg.  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     Priority: Medium        Past Medical History:    Past Medical History:   Diagnosis Date     ABDOMINAL PAIN GENERALIZED 3/15/2006     Abdominal pain, generalized 3/15/2006     Atherosclerosis of renal artery (H)      BENIGN HYPERTENSION 5/1/2006     Benign neoplasm of scalp and skin of neck      Cerebral aneurysm, nonruptured      Congestive heart failure (H)      Depressive disorder, not elsewhere classified      Esophageal reflux      Female stress incontinence      Gastrointestinal malfunction arising from mental factors      Generalized osteoarthrosis, unspecified site      Herpes zoster dermatitis of eyelid      Insomnia, unspecified      Lumbago      Other chest pain      Other specified cardiac dysrhythmias(427.89)      Rectocele      Unspecified disorder of kidney and ureter      Unspecified essential hypertension        Past Surgical History:    Past Surgical History:   Procedure Laterality Date     CHOLECYSTECTOMY, LAPOROSCOPIC  1997    Cholecystectomy, Laparoscopic     CYSTOSCOPY, BIOPSY URETHRA N/A 6/10/2019    Procedure: Cystoscopy With Biopsy, Removal Of Urethral Lesion;  Surgeon: Yesy Fong MD;  Location: [facility] OR     ENDARTERECTOMY CAROTID Right 8/31/2017    Procedure: ENDARTERECTOMY CAROTID;  RIGHT CAROTID ENDARTERECTOMY WITH EEG;  Surgeon: Hilario Parry MD;  Location: [facility] OR     HYSTERECTOMY, RAYMOND  1982    oophorectomy,RAYMOND     SURGICAL HISTORY OF -       Laminectomy x 3     SURGICAL HISTORY OF -       MCA Aneurysm repair     SURGICAL HISTORY OF -   1996    Bladder suspension (Bladder Repair x2)     SURGICAL HISTORY OF -       Bilateral Hand Surgeries for Arthritis x2     SURGICAL HISTORY OF -       Repair of a Cerebral Aneurysm     URETHROPLASTY  "N/A 6/10/2019    Procedure: Urethral Reconstruction;  Surgeon: Yesy Fong MD;  Location: [facility] OR       Family History:    Family History   Problem Relation Age of Onset     Cancer Mother         stomache     Cerebrovascular Disease Father      Heart Disease Father      Cancer Brother         lymphoma     Eye Disorder Son      Asthma Son      Diabetes Son      Gastrointestinal Disease Son         no control over bladder or bowels     C.A.D. No family hx of      Hypertension No family hx of      Breast Cancer No family hx of      Cancer - colorectal No family hx of      Prostate Cancer No family hx of        Social History:  Marital Status:   [5]  Social History     Tobacco Use     Smoking status: Former Smoker     Last attempt to quit: 1992     Years since quittin.6     Smokeless tobacco: Never Used   Substance Use Topics     Alcohol use: Yes     Comment: wine occas.     Drug use: No        Medications:      No current outpatient medications on file.      Review of Systems  A 10 point review of systems performed and is otherwise negative except as noted in the HPI.    Physical Exam   BP: (!) 185/86  Pulse: 70  Heart Rate: 70  Temp: 98  F (36.7  C)  Resp: 16  Height: 160 cm (5' 3\")  Weight: 88 kg (194 lb)  SpO2: 95 %    Physical Exam  GEN: Awake, alert, and oriented x3.  Nontoxic in appearance, not acutely distressed.    HENT: MMM.  No posterior pharyngeal erythema or edema.  Floor of mouth is soft without palpable masses.  Edema and erythema submandibular on the left.  Palpable mass which appears to be approximately 3 x 5 cm in size.  Not recently tenderness but only mild discomfort with palpation.  Overlying erythema.  EYES: EOM intact. Conjunctiva clear. PEERL.    NECK: Supple. See HENT above  CV :Regular rate and rhythm  PULM: Normal effort. No wheezes, rales, or rhonchi bilaterally.  ABD: Soft, mild lower abdominal tenderness to deep palpation, non-distended. No rebound or guarding.  " Perineal signs.  NEURO: Normal speech. Following commands. CN II-XII grossly intact. Patient answering questions and interacting appropriately.   EXT: No gross deformity. Warm and well perfused  INT: Warm. No diaphoresis. Normal color.        ED Course        Procedures             EKG Interpretation:      Interpreted by Farhat Guerra MD  Time reviewed: 1840  Symptoms at time of EKG: nausea and generalized weakness   Rhythm: normal sinus   Rate: Normal  Axis: Normal  Ectopy: premature atrial contraction  Conduction: normal  ST Segments/ T Waves: Non-specific ST-T wave changes  Q Waves: III  Comparison to prior: New ST depression in V5    Clinical Impression: ST depression in V5 and non-specific EKG    Critical Care time:  none               Results for orders placed or performed during the hospital encounter of 09/01/19 (from the past 24 hour(s))   CBC with platelets, differential   Result Value Ref Range    WBC 8.2 4.0 - 11.0 10e9/L    RBC Count 3.72 (L) 3.8 - 5.2 10e12/L    Hemoglobin 11.2 (L) 11.7 - 15.7 g/dL    Hematocrit 36.1 35.0 - 47.0 %    MCV 97 78 - 100 fl    MCH 30.1 26.5 - 33.0 pg    MCHC 31.0 (L) 31.5 - 36.5 g/dL    RDW 13.8 10.0 - 15.0 %    Platelet Count 210 150 - 450 10e9/L    Diff Method Automated Method     % Neutrophils 74.6 %    % Lymphocytes 14.4 %    % Monocytes 8.7 %    % Eosinophils 1.8 %    % Basophils 0.4 %    % Immature Granulocytes 0.1 %    Nucleated RBCs 0 0 /100    Absolute Neutrophil 6.1 1.6 - 8.3 10e9/L    Absolute Lymphocytes 1.2 0.8 - 5.3 10e9/L    Absolute Monocytes 0.7 0.0 - 1.3 10e9/L    Absolute Eosinophils 0.2 0.0 - 0.7 10e9/L    Absolute Basophils 0.0 0.0 - 0.2 10e9/L    Abs Immature Granulocytes 0.0 0 - 0.4 10e9/L    Absolute Nucleated RBC 0.0    Comprehensive metabolic panel   Result Value Ref Range    Sodium 141 133 - 144 mmol/L    Potassium 3.7 3.4 - 5.3 mmol/L    Chloride 106 94 - 109 mmol/L    Carbon Dioxide 30 20 - 32 mmol/L    Anion Gap 5 3 - 14 mmol/L     Glucose 101 (H) 70 - 99 mg/dL    Urea Nitrogen 30 7 - 30 mg/dL    Creatinine 1.42 (H) 0.52 - 1.04 mg/dL    GFR Estimate 33 (L) >60 mL/min/[1.73_m2]    GFR Estimate If Black 38 (L) >60 mL/min/[1.73_m2]    Calcium 9.8 8.5 - 10.1 mg/dL    Bilirubin Total 0.6 0.2 - 1.3 mg/dL    Albumin 3.6 3.4 - 5.0 g/dL    Protein Total 6.9 6.8 - 8.8 g/dL    Alkaline Phosphatase 87 40 - 150 U/L    ALT 16 0 - 50 U/L    AST 15 0 - 45 U/L   Lactic acid whole blood   Result Value Ref Range    Lactic Acid 1.0 0.7 - 2.0 mmol/L   Troponin I   Result Value Ref Range    Troponin I ES <0.015 0.000 - 0.045 ug/L   Blood culture   Result Value Ref Range    Specimen Description Blood Left Arm     Special Requests Aerobic and anaerobic bottles received     Culture Micro No growth after 15 hours    Lipase   Result Value Ref Range    Lipase 62 (L) 73 - 393 U/L   Blood culture   Result Value Ref Range    Specimen Description Blood Right Arm     Special Requests Aerobic and anaerobic bottles received     Culture Micro No growth after 15 hours    CT Soft Tissue Neck w/o Contrast    Narrative    CT SCAN OF THE NECK WITHOUT CONTRAST  9/1/2019 7:40 PM     HISTORY: Concern for submandibular abscess.    TECHNIQUE: Axial CT images of the neck without administration of  intravenous contrast with reformations. Radiation dose for this scan  was reduced using automated exposure control, adjustment of the mA  and/or kV according to patient size, or iterative reconstruction  technique.    COMPARISON: None.     FINDINGS:   Visualized sinuses, nasopharynx and orbits: Mild right posterior  ethmoid mucosal thickening is present. Sinuses, nasopharynx, and  orbits are otherwise without appreciable abnormality.     Tongue, oral cavity and oropharynx:  Unremarkable.    Teeth: No evidence of active odontogenic infection.    Hypopharynx: Unremarkable.     Larynx and trachea: Unremarkable.     Thyroid: Mildly enlarged and heterogeneous in attenuation.     Submandibular glands:  The left submandibular gland is asymmetrically  enlarged and contains periglandular inflammatory stranding. There are  also multiple small calcifications within the left subareolar gland.  No evidence of ductal dilatation. Right submandibular gland  unremarkable.    Parotid glands: Unremarkable.       Lymph nodes: Unremarkable.     Upper mediastinum and lungs: Unremarkable.     Bones: Moderate to severe cervical spine degenerative changes present,  worst at C4-5 and C5-6. There is grade 1 anterolisthesis of C3 on C4.       Impression    IMPRESSION: Asymmetric enlargement of the left submandibular gland  with periglandular inflammatory stranding, consistent with  sialadenitis. Multiple left submandibular gland calcifications are  present consistent with sialoliths. No evidence of salivary ductal  dilatation or obstruction.     HEIDY MCINTYRE MD   UA reflex to Microscopic   Result Value Ref Range    Color Urine Yellow     Appearance Urine Clear     Glucose Urine Negative NEG^Negative mg/dL    Bilirubin Urine Negative NEG^Negative    Ketones Urine Negative NEG^Negative mg/dL    Specific Gravity Urine 1.011 1.003 - 1.035    Blood Urine Negative NEG^Negative    pH Urine 6.0 5.0 - 7.0 pH    Protein Albumin Urine Negative NEG^Negative mg/dL    Urobilinogen mg/dL 0.0 0.0 - 2.0 mg/dL    Nitrite Urine Negative NEG^Negative    Leukocyte Esterase Urine Small (A) NEG^Negative    Source Unspecified Urine     RBC Urine <1 0 - 2 /HPF    WBC Urine 34 (H) 0 - 5 /HPF    Bacteria Urine Few (A) NEG^Negative /HPF    Squamous Epithelial /HPF Urine 1 0 - 1 /HPF    Mucous Urine Present (A) NEG^Negative /LPF   Pelvis XR w/ unilateral hip right    Narrative    PELVIS AND HIP RIGHT ONE VIEW   9/1/2019 9:58 PM     INDICATION: Nontraumatic right hip pain.    COMPARISON: None.      Impression    IMPRESSION: No acute fracture or aggressive bone lesion. Mild  degenerative changes right hip. Left hip arthroplasty. Mild  vascular  calcification.    ALISSA VILLANUEVA MD   Troponin I   Result Value Ref Range    Troponin I ES 0.046 (H) 0.000 - 0.045 ug/L   CBC with platelets   Result Value Ref Range    WBC 6.4 4.0 - 11.0 10e9/L    RBC Count 3.73 (L) 3.8 - 5.2 10e12/L    Hemoglobin 11.1 (L) 11.7 - 15.7 g/dL    Hematocrit 35.8 35.0 - 47.0 %    MCV 96 78 - 100 fl    MCH 29.8 26.5 - 33.0 pg    MCHC 31.0 (L) 31.5 - 36.5 g/dL    RDW 13.5 10.0 - 15.0 %    Platelet Count 204 150 - 450 10e9/L   Basic metabolic panel   Result Value Ref Range    Sodium 142 133 - 144 mmol/L    Potassium 3.4 3.4 - 5.3 mmol/L    Chloride 105 94 - 109 mmol/L    Carbon Dioxide 31 20 - 32 mmol/L    Anion Gap 6 3 - 14 mmol/L    Glucose 100 (H) 70 - 99 mg/dL    Urea Nitrogen 24 7 - 30 mg/dL    Creatinine 1.23 (H) 0.52 - 1.04 mg/dL    GFR Estimate 40 (L) >60 mL/min/[1.73_m2]    GFR Estimate If Black 46 (L) >60 mL/min/[1.73_m2]    Calcium 9.6 8.5 - 10.1 mg/dL   XR Chest 2 Views    Narrative    CHEST TWO VIEWS    9/2/2019 9:32 AM     HISTORY: Shortness of breath.    COMPARISON: Chest x-ray on 5/10/2019      Impression    IMPRESSION: Upright AP and lateral views of the chest were obtained.  Low lung volumes. Stable mild enlargement of the cardiac silhouette  and mild pulmonary vascular congestion. No significant pleural  effusion or pneumothorax.     PATRICA BARRAGAN MD   CT Abdomen Pelvis w/o Contrast    Narrative    CT ABDOMEN AND PELVIS WITHOUT CONTRAST   9/2/2019 9:34 AM     HISTORY: Abdominal pain, diverticulitis suspected.    TECHNIQUE: Noncontrast CT abdomen and pelvis was performed. Radiation  dose for this scan was reduced using automated exposure control,  adjustment of the mA and/or kV according to patient size, or iterative  reconstruction technique.    COMPARISON: CT abdomen and pelvis on 8/24/2007 and pelvic MRI on  6/6/2019.    FINDINGS:   Lung bases: Bibasilar atelectasis. Mild cardiomegaly.    Abdomen/pelvis: Limited evaluation of the abdominal organs due  to lack  of IV contrast. No suspicious focal hepatic mass. The gallbladder is  surgically absent. No splenomegaly. Multiple calcified splenic  granulomas. No pancreatic ductal dilatation or definite solid  pancreatic mass. No significant change in the bilateral adrenal nodule  measuring 2 cm on the left (series 2 image 31) and 1.2 cm on the right  (series 2 image 27). No evidence of kidney stones or hydronephrosis in  either kidney. Multiple bilateral renal cysts, some of which appear  hyperattenuating and likely represent hemorrhagic/proteinaceous cyst  including 1.1 cm cyst arising from the anterior aspect of the lower  pole of the left kidney (series 2 image 31). A few of these cysts  appear new as compared to 8/24/2007 exam including 1.2 cm cyst arising  from the anterolateral aspect of the mid/upper pole of the right  kidney (series 2 image 31). There is 3.3 cm mildly hyperattenuating  cyst arising from the lower pole of the left kidney (series 3 image  96), corresponding to the cyst seen on 2/21/2019 exam and likely  representing hemorrhagic/metastasis.    No abnormally dilated bowel loops. No significant free fluid in the  abdomen or pelvis. No free peritoneal or portal venous gas. Colonic  diverticulosis without CT evidence of acute diverticulitis. Moderate  predominantly aortobiiliac atherosclerotic vascular calcification.  Large lobulated fluid attenuation cystic area in the right inguinal  canal, corresponding to the T2 hyperintense focus seen on 6/6/2019  exam, previously described as a possible lymphatic malformation or  lymphangioma.    Bones and soft tissues: Post surgical changes of lower lumbar spine  fusion. Multilevel degenerative changes of the visualized spine.  Postsurgical changes of left hip arthroplasty. Diffuse osteopenia.  Large fat-containing umbilical hernia.      Impression    IMPRESSION:  1. Colon diverticulosis without CT evidence of acute diverticulitis.  2. Large lobulated cystic  lesion in the right inguinal canal,  previously seen on 6/6/2019 pelvic MRI and characterized as possible  lymphatic malformation/lymphangioma.              PATRICA BARRAGAN MD   Platelet count   Result Value Ref Range    Platelet Count 200 150 - 450 10e9/L   Blood gas venous   Result Value Ref Range    Ph Venous 7.40 7.32 - 7.43 pH    PCO2 Venous 59 (H) 40 - 50 mm Hg    PO2 Venous 21 (L) 25 - 47 mm Hg    Bicarbonate Venous 37 (H) 21 - 28 mmol/L    Base Excess Venous 9.9 mmol/L    FIO2 28    Troponin I   Result Value Ref Range    Troponin I ES 0.042 0.000 - 0.045 ug/L   Creatinine   Result Value Ref Range    Creatinine 1.26 (H) 0.52 - 1.04 mg/dL    GFR Estimate 38 (L) >60 mL/min/[1.73_m2]    GFR Estimate If Black 44 (L) >60 mL/min/[1.73_m2]       Medications   clindamycin (CLEOCIN) infusion 900 mg (900 mg Intravenous New Bag 9/2/19 0506)   acetaminophen (TYLENOL) tablet 650 mg (has no administration in time range)   tiZANidine (ZANAFLEX) tablet 2 mg (has no administration in time range)   oxyCODONE (ROXICODONE) tablet 5 mg (has no administration in time range)   gabapentin (NEURONTIN) capsule 200 mg (200 mg Oral Given 9/2/19 0850)   levalbuterol (XOPENEX) neb solution 1.25 mg (has no administration in time range)   aspirin (ASA) chewable tablet 81 mg (81 mg Oral Given 9/2/19 0851)   omeprazole (priLOSEC) CR capsule 40 mg (40 mg Oral Given 9/2/19 0851)   calcium carbonate (TUMS) chewable tablet 500 mg (has no administration in time range)   acetaminophen (TYLENOL) tablet 1,000 mg (1,000 mg Oral Given 9/2/19 1408)   atorvastatin (LIPITOR) tablet 40 mg (40 mg Oral Given 9/2/19 0850)   donepezil (ARICEPT) tablet 5 mg (5 mg Oral Given 9/2/19 0852)   DULoxetine (CYMBALTA) DR capsule 20 mg (20 mg Oral Given 9/2/19 0854)   Lidocaine (LIDOCARE) 4 % Patch 1 patch (has no administration in time range)   lidocaine patch REMOVAL (has no administration in time range)   lidocaine patch in PLACE ( Transdermal Read 9/2/19 5214)    lisinopril (PRINIVIL/ZESTRIL) tablet 20 mg (20 mg Oral Given 9/2/19 0852)   furosemide (LASIX) tablet 40 mg (40 mg Oral Given 9/2/19 0851)   felodipine ER (PLENDIL) 24 hr tablet 10 mg (10 mg Oral Given 9/2/19 0853)   fluticasone-vilanterol (BREO ELLIPTA) 100-25 MCG/INH inhaler 1 puff (1 puff Inhalation Given 9/2/19 1208)   DULoxetine (CYMBALTA) DR capsule 60 mg (60 mg Oral Not Given 9/2/19 0850)   oxyCODONE (oxyCONTIN) 12 hr tablet 10 mg (10 mg Oral Not Given 9/2/19 0850)   magnesium oxide (MAG-OX) half-tab 200 mg (200 mg Oral Given 9/2/19 1207)   lidocaine 1 % 0.1-1 mL (has no administration in time range)   lidocaine (LMX4) kit (has no administration in time range)   sodium chloride (PF) 0.9% PF flush 3 mL (has no administration in time range)   sodium chloride (PF) 0.9% PF flush 3 mL (3 mLs Intracatheter Given 9/2/19 0958)   naloxone (NARCAN) injection 0.1-0.4 mg (has no administration in time range)   melatonin tablet 1 mg (has no administration in time range)   enoxaparin (LOVENOX) injection 40 mg (40 mg Subcutaneous Given 9/2/19 0855)   lactated ringers infusion ( Intravenous New Bag 9/2/19 0958)   oxyCODONE (ROXICODONE) tablet 5-10 mg (has no administration in time range)   polyethylene glycol (MIRALAX/GLYCOLAX) Packet 17 g (has no administration in time range)   prochlorperazine (COMPAZINE) injection 5 mg (has no administration in time range)     Or   prochlorperazine (COMPAZINE) tablet 5 mg (has no administration in time range)     Or   prochlorperazine (COMPAZINE) Suppository 12.5 mg (has no administration in time range)   ondansetron (ZOFRAN-ODT) ODT tab 4 mg (has no administration in time range)     Or   ondansetron (ZOFRAN) injection 4 mg (has no administration in time range)   labetalol (NORMODYNE/TRANDATE) syringe 20 mg (has no administration in time range)   miconazole (MICATIN/MICRO GUARD) 2 % powder ( Topical Given 9/2/19 6044)   cefuroxime (ZINACEF) 1 g in sodium chloride 0.9 % 100 mL  intermittent infusion (1 g Intravenous Given 9/2/19 1407)   metroNIDAZOLE (FLAGYL) infusion 500 mg (500 mg Intravenous New Bag 9/2/19 1257)   lactated ringers BOLUS 1,000 mL (0 mLs Intravenous Stopped 9/1/19 1929)   ondansetron (ZOFRAN) injection 4 mg (4 mg Intravenous Given 9/1/19 1950)   cefTRIAXone in d5w (ROCEPHIN) intermittent infusion 1 g (1 g Intravenous New Bag 9/1/19 2307)   acetaminophen (TYLENOL) tablet 650 mg (650 mg Oral Given 9/2/19 0000)       Assessments & Plan (with Medical Decision Making)   86 left-sided subfibular mass.-year-old female with complex past medical history which includes hypertension, stage III CKD, COPD, CAD, mild cognitive impairment, who presents from an assisted living facility for evaluation of 3 days of worsening generalized weakness, nausea, vomiting, and an enlarging submandibular mass. On arrival to emergency department vital signs notable for blood pressure 185/86.  In addition to working up nausea vomiting and generalized weakness, concern for submandibular abscess.  Examination not consistent with Pratik's angina.Initial laboratory studies Without leukocytosis, hemoglobin noted to be 11.2 which appears to be near baseline.  Lipase unremarkable.  ECG without any acute ischemic changes, initial troponin below level of detection.  I wanted to obtain a soft tissue CT of neck with contrast to evaluate for abscess but given her GFR in the 30s did not feel this would be the appropriate course at this time.  I spoke with the radiologist by phone who said that he would likely be able to get the information he needs on a noncontrasted study.  That study was obtained and notable for asymmetric left submandibular gland enlargement with periglandular stranding consistent with sialoadenitis.  Given the overlying erythema and tenderness IV dose of clindamycin given for infectious coverage.  Initial choice was Augmentin however she has allergy listed in her chart.  Urinalysis concerning  for infection, she says she has had previous urine infections in now does report increase in frequency.  UA sent for culture, dose of ceftriaxone ordered.  Patient normally ambulates in her apartment with the assistance of a walker however this evening she is unable to sit up independently from a recumbent position, unable to stand without 2 person assistance and unable to transfer to a seated position.  Says this is due to her weakness but also complaining of right-sided hip pain during transfer.  She denies any recent trauma.  X-ray of pelvis and right hip pending.  Plan to admit her to hospital for further evaluation and management of her weakness, sialadenitis, probable urinary tract infection, PT/OT, and symptomatic treatment.       I have reviewed the nursing notes.     I have reviewed the findings, diagnosis, plan and need for follow up with the patient.       Current Discharge Medication List          Final diagnoses:   Generalized muscle weakness   Non-intractable vomiting with nausea, unspecified vomiting type   Sialolithiasis of submandibular gland   Acute cystitis without hematuria     Farhat Guerra MD    9/1/2019   [facility] EMERGENCY DEPARTMENT    Disclaimer: This note consists of words and symbols derived from keyboarding and dictation using voice recognition software.  As a result, there may be errors that have gone undetected.  Please consider this when interpreting information found in this note.     Farhat Guerra MD  09/02/19 5643

## 2019-09-01 NOTE — LETTER
Transition Communication Hand-off for Care Transitions to Next Level of Care Provider    Name: Jazmin Clifton  : 1932  MRN #: [id number]  Primary Care Provider: Junie Estrella  Primary Care MD Name: (Delores)  Primary Clinic: [address] [place] [id number]  Primary Care Clinic Name: ([facility])  Reason for Hospitalization:  Generalized muscle weakness [M62.81]  Sialolithiasis of submandibular gland [K11.5]  Acute cystitis without hematuria [N30.00]  Non-intractable vomiting with nausea, unspecified vomiting type [R11.2]  Admit Date/Time: 2019  6:11 PM  Discharge Date: 19  Payor Source: Payor: MEDICA / Plan: MEDICA [employer] [facility] [employer] / Product Type: HMO /     Readmission Assessment Measure (LINN) Risk Score/category: Average         Reason for Communication Hand-off Referral: discharge to a TCU    Discharge Plan:  [facility] (Main: [phone] Admissions: [phone] Fax: [phone])       Concern for non-adherence with plan of care:   Y/N No  Discharge Needs Assessment:  Needs      Most Recent Value   Equipment Currently Used at Home  walker, rolling, other (see comments) [motorized scooter]   Skilled Nursing Facility  [facility] [phone], Fax: [phone]        Follow-up plan:    Future Appointments   Date Time Provider Department Center   2019  9:30 AM Ole Mendoza MD [facility]       Any outstanding tests or procedures:    Procedures     Future Labs/Procedures    Oxygen - Nasal cannula     Comments:    2 Lpm by nasal cannula to keep O2 sats 90 % or greater.          Referrals     Future Labs/Procedures    Occupational Therapy Adult Consult     Comments:    Evaluate and treat as clinically indicated.    Reason:  weak    OTOLARYNGOLOGY REFERRAL     Comments:    Your provider has referred you to: [facility]: [facility] [phone]   [email or web address]  [facility]: Adult Ear, Nose and Throat  [facility] (Otolaryngology) [facility] [phone]  [email or web address]    Please be aware that coverage of these services is subject to the terms and limitations of your health insurance plan.  Call member services at your health plan with any benefit or coverage questions.      Please bring the following with you to your appointment:    (1) Any X-Rays, CTs or MRIs which have been performed.  Contact the facility where they were done to arrange for  prior to your scheduled appointment.   (2) List of current medications  (3) This referral request   (4) Any documents/labs given to you for this referral    Physical Therapy Adult Consult     Comments:    Evaluate and treat as clinically indicated.    Reason:  aditya Newby RN Care Coordinator    AVS/Discharge Summary is the source of truth; this is a helpful guide for improved communication of patient story

## 2019-09-02 ENCOUNTER — APPOINTMENT (OUTPATIENT)
Dept: OCCUPATIONAL THERAPY | Facility: CLINIC | Age: 84
DRG: 154 | End: 2019-09-02
Payer: COMMERCIAL

## 2019-09-02 ENCOUNTER — APPOINTMENT (OUTPATIENT)
Dept: GENERAL RADIOLOGY | Facility: CLINIC | Age: 84
DRG: 154 | End: 2019-09-02
Attending: FAMILY MEDICINE
Payer: COMMERCIAL

## 2019-09-02 ENCOUNTER — APPOINTMENT (OUTPATIENT)
Dept: CT IMAGING | Facility: CLINIC | Age: 84
DRG: 154 | End: 2019-09-02
Attending: FAMILY MEDICINE
Payer: COMMERCIAL

## 2019-09-02 PROBLEM — K11.20 SIALADENITIS: Status: ACTIVE | Noted: 2019-09-02

## 2019-09-02 LAB
ANION GAP SERPL CALCULATED.3IONS-SCNC: 6 MMOL/L (ref 3–14)
BASE EXCESS BLDV CALC-SCNC: 9.9 MMOL/L
BUN SERPL-MCNC: 24 MG/DL (ref 7–30)
CALCIUM SERPL-MCNC: 9.6 MG/DL (ref 8.5–10.1)
CHLORIDE SERPL-SCNC: 105 MMOL/L (ref 94–109)
CO2 SERPL-SCNC: 31 MMOL/L (ref 20–32)
CREAT SERPL-MCNC: 1.23 MG/DL (ref 0.52–1.04)
CREAT SERPL-MCNC: 1.26 MG/DL (ref 0.52–1.04)
ERYTHROCYTE [DISTWIDTH] IN BLOOD BY AUTOMATED COUNT: 13.5 % (ref 10–15)
GFR SERPL CREATININE-BSD FRML MDRD: 38 ML/MIN/{1.73_M2}
GFR SERPL CREATININE-BSD FRML MDRD: 40 ML/MIN/{1.73_M2}
GLUCOSE SERPL-MCNC: 100 MG/DL (ref 70–99)
HCO3 BLDV-SCNC: 37 MMOL/L (ref 21–28)
HCT VFR BLD AUTO: 35.8 % (ref 35–47)
HGB BLD-MCNC: 11.1 G/DL (ref 11.7–15.7)
MCH RBC QN AUTO: 29.8 PG (ref 26.5–33)
MCHC RBC AUTO-ENTMCNC: 31 G/DL (ref 31.5–36.5)
MCV RBC AUTO: 96 FL (ref 78–100)
MRSA DNA SPEC QL NAA+PROBE: NEGATIVE
O2/TOTAL GAS SETTING VFR VENT: 28 %
PCO2 BLDV: 59 MM HG (ref 40–50)
PH BLDV: 7.4 PH (ref 7.32–7.43)
PLATELET # BLD AUTO: 200 10E9/L (ref 150–450)
PLATELET # BLD AUTO: 204 10E9/L (ref 150–450)
PO2 BLDV: 21 MM HG (ref 25–47)
POTASSIUM SERPL-SCNC: 3.4 MMOL/L (ref 3.4–5.3)
RBC # BLD AUTO: 3.73 10E12/L (ref 3.8–5.2)
SODIUM SERPL-SCNC: 142 MMOL/L (ref 133–144)
SPECIMEN SOURCE: NORMAL
TROPONIN I SERPL-MCNC: 0.04 UG/L (ref 0–0.04)
TROPONIN I SERPL-MCNC: 0.05 UG/L (ref 0–0.04)
WBC # BLD AUTO: 6.4 10E9/L (ref 4–11)

## 2019-09-02 PROCEDURE — 25000132 ZZH RX MED GY IP 250 OP 250 PS 637: Performed by: EMERGENCY MEDICINE

## 2019-09-02 PROCEDURE — 25000128 H RX IP 250 OP 636: Performed by: FAMILY MEDICINE

## 2019-09-02 PROCEDURE — 36415 COLL VENOUS BLD VENIPUNCTURE: CPT | Performed by: PHYSICIAN ASSISTANT

## 2019-09-02 PROCEDURE — 25000132 ZZH RX MED GY IP 250 OP 250 PS 637: Performed by: FAMILY MEDICINE

## 2019-09-02 PROCEDURE — 40000894 ZZH STATISTIC OT IP EVAL DEFER

## 2019-09-02 PROCEDURE — 40000274 ZZH STATISTIC RCP CONSULT EA 30 MIN

## 2019-09-02 PROCEDURE — 12000000 ZZH R&B MED SURG/OB

## 2019-09-02 PROCEDURE — 82565 ASSAY OF CREATININE: CPT | Performed by: FAMILY MEDICINE

## 2019-09-02 PROCEDURE — 25000132 ZZH RX MED GY IP 250 OP 250 PS 637: Performed by: PHYSICIAN ASSISTANT

## 2019-09-02 PROCEDURE — 87641 MR-STAPH DNA AMP PROBE: CPT | Performed by: FAMILY MEDICINE

## 2019-09-02 PROCEDURE — 85049 AUTOMATED PLATELET COUNT: CPT | Performed by: FAMILY MEDICINE

## 2019-09-02 PROCEDURE — 25800030 ZZH RX IP 258 OP 636: Performed by: FAMILY MEDICINE

## 2019-09-02 PROCEDURE — 97165 OT EVAL LOW COMPLEX 30 MIN: CPT | Mod: GO

## 2019-09-02 PROCEDURE — 84484 ASSAY OF TROPONIN QUANT: CPT | Performed by: PHYSICIAN ASSISTANT

## 2019-09-02 PROCEDURE — 25000132 ZZH RX MED GY IP 250 OP 250 PS 637: Performed by: INTERNAL MEDICINE

## 2019-09-02 PROCEDURE — 84484 ASSAY OF TROPONIN QUANT: CPT | Performed by: FAMILY MEDICINE

## 2019-09-02 PROCEDURE — 36415 COLL VENOUS BLD VENIPUNCTURE: CPT | Performed by: FAMILY MEDICINE

## 2019-09-02 PROCEDURE — 87640 STAPH A DNA AMP PROBE: CPT | Performed by: FAMILY MEDICINE

## 2019-09-02 PROCEDURE — 93005 ELECTROCARDIOGRAM TRACING: CPT

## 2019-09-02 PROCEDURE — 80048 BASIC METABOLIC PNL TOTAL CA: CPT | Performed by: PHYSICIAN ASSISTANT

## 2019-09-02 PROCEDURE — 99223 1ST HOSP IP/OBS HIGH 75: CPT | Mod: AI | Performed by: FAMILY MEDICINE

## 2019-09-02 PROCEDURE — 25000125 ZZHC RX 250: Performed by: EMERGENCY MEDICINE

## 2019-09-02 PROCEDURE — 71046 X-RAY EXAM CHEST 2 VIEWS: CPT

## 2019-09-02 PROCEDURE — 74176 CT ABD & PELVIS W/O CONTRAST: CPT

## 2019-09-02 PROCEDURE — 82803 BLOOD GASES ANY COMBINATION: CPT | Performed by: FAMILY MEDICINE

## 2019-09-02 PROCEDURE — 85027 COMPLETE CBC AUTOMATED: CPT | Performed by: PHYSICIAN ASSISTANT

## 2019-09-02 RX ORDER — DULOXETIN HYDROCHLORIDE 30 MG/1
60 CAPSULE, DELAYED RELEASE ORAL DAILY
Status: DISCONTINUED | OUTPATIENT
Start: 2019-09-03 | End: 2019-09-06 | Stop reason: HOSPADM

## 2019-09-02 RX ORDER — PROCHLORPERAZINE MALEATE 5 MG
5 TABLET ORAL EVERY 6 HOURS PRN
Status: DISCONTINUED | OUTPATIENT
Start: 2019-09-02 | End: 2019-09-06 | Stop reason: HOSPADM

## 2019-09-02 RX ORDER — LEVALBUTEROL INHALATION SOLUTION 1.25 MG/3ML
1 SOLUTION RESPIRATORY (INHALATION) EVERY 4 HOURS PRN
Status: DISCONTINUED | OUTPATIENT
Start: 2019-09-02 | End: 2019-09-06 | Stop reason: HOSPADM

## 2019-09-02 RX ORDER — NYSTATIN 100000 [USP'U]/G
POWDER TOPICAL 2 TIMES DAILY
Status: DISCONTINUED | OUTPATIENT
Start: 2019-09-02 | End: 2019-09-02 | Stop reason: CLARIF

## 2019-09-02 RX ORDER — OXYCODONE HCL 10 MG/1
10 TABLET, FILM COATED, EXTENDED RELEASE ORAL EVERY 12 HOURS
Status: DISCONTINUED | OUTPATIENT
Start: 2019-09-02 | End: 2019-09-06 | Stop reason: HOSPADM

## 2019-09-02 RX ORDER — OXYCODONE HYDROCHLORIDE 5 MG/1
5-10 TABLET ORAL
Status: DISCONTINUED | OUTPATIENT
Start: 2019-09-02 | End: 2019-09-06 | Stop reason: HOSPADM

## 2019-09-02 RX ORDER — FUROSEMIDE 40 MG
40 TABLET ORAL 2 TIMES DAILY
Status: DISCONTINUED | OUTPATIENT
Start: 2019-09-02 | End: 2019-09-06 | Stop reason: HOSPADM

## 2019-09-02 RX ORDER — ISRADIPINE 5 MG/1
5 CAPSULE ORAL 2 TIMES DAILY
Status: DISCONTINUED | OUTPATIENT
Start: 2019-09-02 | End: 2019-09-02 | Stop reason: CLARIF

## 2019-09-02 RX ORDER — DULOXETIN HYDROCHLORIDE 20 MG/1
20 CAPSULE, DELAYED RELEASE ORAL DAILY
Status: DISCONTINUED | OUTPATIENT
Start: 2019-09-02 | End: 2019-09-02

## 2019-09-02 RX ORDER — ATORVASTATIN CALCIUM 20 MG/1
40 TABLET, FILM COATED ORAL DAILY
Status: DISCONTINUED | OUTPATIENT
Start: 2019-09-02 | End: 2019-09-06 | Stop reason: HOSPADM

## 2019-09-02 RX ORDER — LIDOCAINE 4 G/G
1 PATCH TOPICAL DAILY PRN
Status: DISCONTINUED | OUTPATIENT
Start: 2019-09-02 | End: 2019-09-06 | Stop reason: HOSPADM

## 2019-09-02 RX ORDER — ONDANSETRON 4 MG/1
4 TABLET, ORALLY DISINTEGRATING ORAL EVERY 6 HOURS PRN
Status: DISCONTINUED | OUTPATIENT
Start: 2019-09-02 | End: 2019-09-06 | Stop reason: HOSPADM

## 2019-09-02 RX ORDER — LIDOCAINE 40 MG/G
CREAM TOPICAL
Status: DISCONTINUED | OUTPATIENT
Start: 2019-09-02 | End: 2019-09-06 | Stop reason: HOSPADM

## 2019-09-02 RX ORDER — FUROSEMIDE 40 MG
40 TABLET ORAL 2 TIMES DAILY
Status: DISCONTINUED | OUTPATIENT
Start: 2019-09-02 | End: 2019-09-02

## 2019-09-02 RX ORDER — SODIUM CHLORIDE, SODIUM LACTATE, POTASSIUM CHLORIDE, CALCIUM CHLORIDE 600; 310; 30; 20 MG/100ML; MG/100ML; MG/100ML; MG/100ML
INJECTION, SOLUTION INTRAVENOUS CONTINUOUS
Status: DISCONTINUED | OUTPATIENT
Start: 2019-09-02 | End: 2019-09-03

## 2019-09-02 RX ORDER — LABETALOL 20 MG/4 ML (5 MG/ML) INTRAVENOUS SYRINGE
20 EVERY 4 HOURS PRN
Status: DISCONTINUED | OUTPATIENT
Start: 2019-09-02 | End: 2019-09-06 | Stop reason: HOSPADM

## 2019-09-02 RX ORDER — LISINOPRIL 20 MG/1
20 TABLET ORAL 2 TIMES DAILY
Status: DISCONTINUED | OUTPATIENT
Start: 2019-09-02 | End: 2019-09-02

## 2019-09-02 RX ORDER — CEFTRIAXONE SODIUM 1 G/50ML
1 INJECTION, SOLUTION INTRAVENOUS EVERY 24 HOURS
Status: DISCONTINUED | OUTPATIENT
Start: 2019-09-02 | End: 2019-09-02

## 2019-09-02 RX ORDER — GABAPENTIN 100 MG/1
200 CAPSULE ORAL 2 TIMES DAILY
Status: DISCONTINUED | OUTPATIENT
Start: 2019-09-02 | End: 2019-09-06 | Stop reason: HOSPADM

## 2019-09-02 RX ORDER — ONDANSETRON 2 MG/ML
4 INJECTION INTRAMUSCULAR; INTRAVENOUS EVERY 6 HOURS PRN
Status: DISCONTINUED | OUTPATIENT
Start: 2019-09-02 | End: 2019-09-06 | Stop reason: HOSPADM

## 2019-09-02 RX ORDER — DONEPEZIL HYDROCHLORIDE 5 MG/1
5 TABLET, FILM COATED ORAL DAILY
Status: DISCONTINUED | OUTPATIENT
Start: 2019-09-02 | End: 2019-09-06 | Stop reason: HOSPADM

## 2019-09-02 RX ORDER — CALCIUM CARBONATE 500 MG/1
500 TABLET, CHEWABLE ORAL
Status: DISCONTINUED | OUTPATIENT
Start: 2019-09-02 | End: 2019-09-06 | Stop reason: HOSPADM

## 2019-09-02 RX ORDER — PROCHLORPERAZINE 25 MG
12.5 SUPPOSITORY, RECTAL RECTAL EVERY 12 HOURS PRN
Status: DISCONTINUED | OUTPATIENT
Start: 2019-09-02 | End: 2019-09-06 | Stop reason: HOSPADM

## 2019-09-02 RX ORDER — ACETAMINOPHEN 500 MG
1000 TABLET ORAL 3 TIMES DAILY
Status: DISCONTINUED | OUTPATIENT
Start: 2019-09-02 | End: 2019-09-06 | Stop reason: HOSPADM

## 2019-09-02 RX ORDER — POLYETHYLENE GLYCOL 3350 17 G/17G
17 POWDER, FOR SOLUTION ORAL DAILY PRN
Status: DISCONTINUED | OUTPATIENT
Start: 2019-09-02 | End: 2019-09-06 | Stop reason: HOSPADM

## 2019-09-02 RX ORDER — DULOXETIN HYDROCHLORIDE 30 MG/1
60 CAPSULE, DELAYED RELEASE ORAL DAILY
Status: DISCONTINUED | OUTPATIENT
Start: 2019-09-02 | End: 2019-09-02 | Stop reason: RX

## 2019-09-02 RX ORDER — DULOXETIN HYDROCHLORIDE 30 MG/1
30 CAPSULE, DELAYED RELEASE ORAL AT BEDTIME
Status: DISCONTINUED | OUTPATIENT
Start: 2019-09-02 | End: 2019-09-06 | Stop reason: HOSPADM

## 2019-09-02 RX ORDER — NALOXONE HYDROCHLORIDE 0.4 MG/ML
.1-.4 INJECTION, SOLUTION INTRAMUSCULAR; INTRAVENOUS; SUBCUTANEOUS
Status: DISCONTINUED | OUTPATIENT
Start: 2019-09-02 | End: 2019-09-06 | Stop reason: HOSPADM

## 2019-09-02 RX ORDER — ASPIRIN 81 MG/1
81 TABLET, CHEWABLE ORAL DAILY
Status: DISCONTINUED | OUTPATIENT
Start: 2019-09-02 | End: 2019-09-06 | Stop reason: HOSPADM

## 2019-09-02 RX ORDER — LISINOPRIL 20 MG/1
20 TABLET ORAL 2 TIMES DAILY
Status: DISCONTINUED | OUTPATIENT
Start: 2019-09-02 | End: 2019-09-06 | Stop reason: HOSPADM

## 2019-09-02 RX ORDER — TIZANIDINE 2 MG/1
2 TABLET ORAL 3 TIMES DAILY PRN
Status: DISCONTINUED | OUTPATIENT
Start: 2019-09-02 | End: 2019-09-06 | Stop reason: HOSPADM

## 2019-09-02 RX ORDER — FELODIPINE 10 MG/1
10 TABLET, EXTENDED RELEASE ORAL DAILY
Status: DISCONTINUED | OUTPATIENT
Start: 2019-09-02 | End: 2019-09-06 | Stop reason: HOSPADM

## 2019-09-02 RX ORDER — OXYCODONE HYDROCHLORIDE 5 MG/1
5 TABLET ORAL DAILY PRN
Status: DISCONTINUED | OUTPATIENT
Start: 2019-09-02 | End: 2019-09-06 | Stop reason: HOSPADM

## 2019-09-02 RX ADMIN — ENOXAPARIN SODIUM 40 MG: 40 INJECTION SUBCUTANEOUS at 08:55

## 2019-09-02 RX ADMIN — METRONIDAZOLE 500 MG: 500 INJECTION, SOLUTION INTRAVENOUS at 18:40

## 2019-09-02 RX ADMIN — OMEPRAZOLE 40 MG: 20 CAPSULE, DELAYED RELEASE ORAL at 08:51

## 2019-09-02 RX ADMIN — Medication 200 MG: at 12:07

## 2019-09-02 RX ADMIN — DULOXETINE HYDROCHLORIDE 30 MG: 30 CAPSULE, DELAYED RELEASE ORAL at 21:52

## 2019-09-02 RX ADMIN — FELODIPINE 10 MG: 10 TABLET, EXTENDED RELEASE ORAL at 08:53

## 2019-09-02 RX ADMIN — FUROSEMIDE 40 MG: 40 TABLET ORAL at 08:51

## 2019-09-02 RX ADMIN — MICONAZOLE NITRATE: 20 POWDER TOPICAL at 20:22

## 2019-09-02 RX ADMIN — SODIUM CHLORIDE, POTASSIUM CHLORIDE, SODIUM LACTATE AND CALCIUM CHLORIDE: 600; 310; 30; 20 INJECTION, SOLUTION INTRAVENOUS at 09:58

## 2019-09-02 RX ADMIN — LISINOPRIL 20 MG: 20 TABLET ORAL at 02:19

## 2019-09-02 RX ADMIN — CLINDAMYCIN IN 5 PERCENT DEXTROSE 900 MG: 18 INJECTION, SOLUTION INTRAVENOUS at 05:06

## 2019-09-02 RX ADMIN — GABAPENTIN 200 MG: 100 CAPSULE ORAL at 08:50

## 2019-09-02 RX ADMIN — CLINDAMYCIN IN 5 PERCENT DEXTROSE 900 MG: 18 INJECTION, SOLUTION INTRAVENOUS at 14:48

## 2019-09-02 RX ADMIN — FUROSEMIDE 40 MG: 40 TABLET ORAL at 20:20

## 2019-09-02 RX ADMIN — OXYCODONE HYDROCHLORIDE 10 MG: 10 TABLET, FILM COATED, EXTENDED RELEASE ORAL at 20:20

## 2019-09-02 RX ADMIN — ACETAMINOPHEN 1000 MG: 500 TABLET, FILM COATED ORAL at 14:08

## 2019-09-02 RX ADMIN — CEFUROXIME SODIUM 1 G: 1.5 INJECTION, POWDER, FOR SOLUTION INTRAVENOUS at 14:07

## 2019-09-02 RX ADMIN — ACETAMINOPHEN 650 MG: 325 TABLET ORAL at 00:00

## 2019-09-02 RX ADMIN — ASPIRIN 81 MG 81 MG: 81 TABLET ORAL at 08:51

## 2019-09-02 RX ADMIN — GABAPENTIN 200 MG: 100 CAPSULE ORAL at 20:20

## 2019-09-02 RX ADMIN — METRONIDAZOLE 500 MG: 500 INJECTION, SOLUTION INTRAVENOUS at 12:57

## 2019-09-02 RX ADMIN — ACETAMINOPHEN 1000 MG: 500 TABLET, FILM COATED ORAL at 08:49

## 2019-09-02 RX ADMIN — ATORVASTATIN CALCIUM 40 MG: 20 TABLET, FILM COATED ORAL at 08:50

## 2019-09-02 RX ADMIN — DONEPEZIL HYDROCHLORIDE 5 MG: 5 TABLET ORAL at 08:52

## 2019-09-02 RX ADMIN — DULOXETINE HYDROCHLORIDE 20 MG: 20 CAPSULE, DELAYED RELEASE ORAL at 08:54

## 2019-09-02 RX ADMIN — CEFUROXIME SODIUM 1 G: 1.5 INJECTION, POWDER, FOR SOLUTION INTRAVENOUS at 23:05

## 2019-09-02 RX ADMIN — FLUTICASONE FUROATE AND VILANTEROL TRIFENATATE 1 PUFF: 100; 25 POWDER RESPIRATORY (INHALATION) at 12:08

## 2019-09-02 RX ADMIN — LIDOCAINE 1 PATCH: 560 PATCH PERCUTANEOUS; TOPICAL; TRANSDERMAL at 21:52

## 2019-09-02 RX ADMIN — FUROSEMIDE 40 MG: 40 TABLET ORAL at 02:19

## 2019-09-02 RX ADMIN — LISINOPRIL 20 MG: 20 TABLET ORAL at 20:21

## 2019-09-02 RX ADMIN — ACETAMINOPHEN 1000 MG: 500 TABLET, FILM COATED ORAL at 20:20

## 2019-09-02 RX ADMIN — CLINDAMYCIN IN 5 PERCENT DEXTROSE 900 MG: 18 INJECTION, SOLUTION INTRAVENOUS at 23:47

## 2019-09-02 RX ADMIN — LISINOPRIL 20 MG: 20 TABLET ORAL at 08:52

## 2019-09-02 RX ADMIN — MICONAZOLE NITRATE: 20 POWDER TOPICAL at 12:08

## 2019-09-02 ASSESSMENT — ACTIVITIES OF DAILY LIVING (ADL)
ADLS_ACUITY_SCORE: 23
ADLS_ACUITY_SCORE: 19
ADLS_ACUITY_SCORE: 23

## 2019-09-02 NOTE — PHARMACY
Writer called [facility] to obtain medication information. Writer spoke to a Nurse on TCU and it was stated they'd fax over the medication list. Still no medical list. Writer called facility again and no answer. Writer left voicemail and informed Pharmacy staff of attempts.

## 2019-09-02 NOTE — PLAN OF CARE
"Patient alert and orientated. Vital signs stable. Patient on 2L of oxygen to maintain sats above 90%.  Afebrile.     Patient continues to have weakness. Patient declined to get out of bed to work with PT. Patient did get up into the wheelchair with assist of 2 with and pivoting to go down to CT and x-ray. However, the patient returned to the floor via cart because she was unable to stand to get back into the wheelchair. Patient sleepy the rest of the day. Patient got up to chair this late evening with assist of 2 pivoting. Patient tolerated it, but was still sleepy. Patient asked \"Why am I so sleepy?\"     Lump and redness noted on left side of neck. Warm compress applied to site and patient encouraged to massage area. Lemon drops provided and are on the bedside table. Patient noted to cough while eating breakfast and lunch. Per MD diet modified to full liquids. Scheduled IV antibiotics given.      Patient incontinent of urine. Patient bladder scanned with a reading of 130 mL. Patient reports right hip pain with repositioning and ita-cares. Scheduled tylenol given. Pink skin in groin area; miconazole powder applied.          "

## 2019-09-02 NOTE — PLAN OF CARE
"[facility] ADMISSION NOTE    Patient admitted to room 2304 at approximately 2250 via cart from emergency room. Patient was accompanied by transport tech.     Verbal SBAR report received from Ewelina ELKINS prior to patient arrival.     Patient trasferred to bed via air juanis. Patient alert and oriented X 3. The patient is not having any pain.  . Admission vital signs: Blood pressure (!) 199/92, pulse 78, temperature 98.2  F (36.8  C), temperature source Oral, resp. rate 20, height 1.6 m (5' 3\"), weight 87.3 kg (192 lb 7.4 oz), SpO2 92 %. Patient was oriented to plan of care, call light, bed controls, tv, telephone, bathroom and visiting hours.     Ed, patient's son, was called and updated per patient's request. Chi, patient's son, was called and a voice mail was left.    Risk Assessment    The following safety risks were identified during admission: fall and skin. Yellow risk band applied: YES.     Skin Initial Assessment    This writer admitted this patient and completed a full skin assessment and Will score in the Adult PCS flowsheet. Appropriate interventions initiated as needed.     Secondary skin check completed by Cinthia ELKINS.         Deysi Padilla RN      "

## 2019-09-02 NOTE — H&P
Admitted:     09/01/2019      CHIEF COMPLAINT:  Nausea, vomiting, weakness and left facial pain.      HISTORY OF PRESENT ILLNESS:  The patient with a complex medical history reviewed as below comes in with a 3-day history of general malaise, weakness, nausea, vomiting several times a day and at the same time has noted some swelling of her left jaw area and tenderness.  She has had no fevers that she knows of.  She did have 1 episode of diarrhea.  She does complain of a little abdominal pain in the low abdomen, but her primary pain is her right hip which has been ongoing for a long time and now this left jaw.      She denies chest pain or increasing shortness of breath.  She has underlying COPD.      PAST MEDICAL HISTORY:   1.  COPD with recurrent exacerbations and chronic hypercapnic respiratory failure.   2.  Hypertension.   3.  Chronic overactive bladder.   4.  GERD.   5.  Hyperlipidemia.   6.  CKD.   7.  History of CVA/TIA.   8.  History of chronic diastolic heart failure.   9.  Morbid obesity.   10.  Chronic right hip pain treated recently with steroid injections through Orthopedics with intermittent relief.   11.  Mild cognitive impairment suspect early Alzheimer disease.   12.  Mild major depression.   13.  Cerebrovascular disease with carotid stenosis, status post repair.   14.  Hyperlipidemia.   15.  Spinal stenosis secondary to degenerative disk disease.      ALLERGIES:  ACCUPRIL, UNKNOWN REACTION.  LEVAQUIN CAUSED MUSCLE PAIN.  MORPHINE CAUSED HALLUCINATIONS, NITROFURANTOIN CAUSED NAUSEA AND VOMITING, NORVASC CAUSED LEG SWELLING.  QUINAPRIL CAUSED HYPERTENSION, AUGMENTIN, UNKNOWN REACTION.      SOCIAL HISTORY:  She lives in a [facility].  She has a son who checks in on her frequently.  She is a former smoker, quit in 1992.  Alcohol use, rare.      FAMILY HISTORY:  Reviewed in Epic, please see Epic for details, noncontributory at this point.      REVIEW OF SYSTEMS:  She has chronic right hip  pain which has been worse the last week or so.  Again, her last steroid injection was a month ago.  She did get some transient relief from that.  She has some chronic cough, no worse, no worsening of her chronic shortness of breath recently.  She has been retching with this current illness, poor oral intake.  No fevers that she knows of.  No sweats.  She has felt weak and some lightheadedness.  The abdominal pain is mild, primarily low abdomen.      OBJECTIVE:   GENERAL:  She is awake, alert, in obvious distress.  She is retching while I am in with her.  She has marked hip pain on any attempts to move the right side.  She is alert, oriented x 3, remembers me.   VITAL SIGNS:  She is afebrile, pulse 82, respirations 24 down to 18, O2 sat down to 91% on room air, 93% on 2 liters, blood pressure 191/86 on presentation, currently 149/58.   HEENT:  Eyes show pupils equal and reactive, EOMs intact.  TMs normal.  Nasal mucosa normal.  Throat is normal.   NECK:  Supple, without masses, nodes or thyromegaly except for some definite tenderness over a mass under the left jaw.  There is a little bit of erythema over that area as well.   LUNGS:  Clear.  Slightly prolonged expiratory phase.   CARDIOVASCULAR:  Regular rate and rhythm with soft heart sounds, no murmurs that I can hear.  No JVD, HJR.  No edema at this time.  Pulses are brisk and equal.   ABDOMEN:  Soft with some definite tenderness on the left lower quadrant which is reproducible.  No rebound, no guarding.  Bowel sounds are active.   GENITOURINARY AND RECTAL:  Deferred.   EXTREMITIES:  Grossly normal.   NEUROLOGIC:  Shows good strength, sensation, rapid alternating movements in the upper extremities, good strength and sensation in the lower extremities.      IMAGING:  Chest x-ray and CT abdomen and pelvis are pending.  Pelvic x-ray shows mild DJD of the right hip and the left hip arthroplasty.  Soft tissue neck CT shows evidence of acute inflammation and swelling  about the left submandibular gland and multiple sialoliths.   CT abd: no evidence of diverticulitis. Large right cyst right inguinal canal, previously identified as a lymph collection on MRI from 6/19     LABORATORY:  CBC is unremarkable except for mild anemia at 11.2; this is approximately at her baseline.  UA is positive for 34 WBCs per high-powered field.  Chemistries show a creatinine of 1.42 with a BUN of 30; this is approximately close to her baseline.  Lipase was negative.  Troponin was 0.015 on admission, up to 0.046 today.  EKGs show some old inferior infarct, otherwise no change since previous EKGs.      ASSESSMENT:   1.  Acute left submandibular sialadenitis.  She was started on Rocephin but will change to cefuroxime and flagyl per UTD>  Would add vanc if MRSA swab positive.   .  Will use heat and massage and IVF hydration plus lemon drops.  ENT is not available but will call for advice.   Presume antibiotic should cover this for now.  She may ultimately need probing of her duct.   On review of CT there may be more dilatation of the duct than reflected in the report.     2.  Nausea, vomiting, weakness.  I suspect this is secondary to the sialadenitis since it started at the same time, but she has a number of other issues, so we will continue to investigate.     3.  Possible urinary tract infection.  She had 34 WBCs per high-powered field and has some worsening incontinence.  We will check bladder scans, but the Rocephin started for the sialadenitis should also cover this while cultures are pending.     4.  Slight elevation in troponin.  She is actually below the 99th percentile but it is an increase since admission.  We will follow troponins.  No EKG changes and no chest pains so I suspect this is not acute coronary syndrome.     5.  Chronic kidney disease.  She is approximately at baseline with a creatinine of 1.34.     6.  Hypertension.  She was quite hypertensive when she came in.  It has come down now  to reasonable levels 145 systolic.  Will use labetalol p.r.n. blood pressures greater than 170/105.     7.  Chronic obstructive pulmonary disease with chronic hypercapnia.  We will check a VBG.     8.  Abdominal pain.  It is unclear if this is significant, but she did have some tenderness.  We will check a CT abdomen and pelvis.   -this was negative for acute process     9.  Right hip pain.  This is her primary pain complaint.  This has been ongoing and she has had ongoing steroid injections for this.  It seems like this is more trochanteric bursitis but it is unclear since her [facility] are done in [facility] and I cannot get those records.  At this point, she has been on OxyContin and will continue that, plus Tylenol plus oxycodone as needed, but try to use that sparingly.      CODE STATUS:  DNR/DNI, per POLST.      PROPHYLAXIS:  Because she is ill, will go ahead and use Lovenox.      DISPOSITION:  She needs inpatient care for treatment of these acute illnesses. Will need to see ENT, Dr Ruel Hernandez after discharge.          UZMA GOMEZ MD             D: 2019   T: 2019   MT: EDOUARD      Name:     VICKIE REID   MRN:      [id number]        Account:      [id number]   :      1932        Admitted:     2019                   Document: [id number]

## 2019-09-02 NOTE — PROGRESS NOTES
09/02/19 1200   Quick Adds   Type of Visit Initial Occupational Therapy Evaluation   Living Environment   Lives With facility resident   Living Arrangements assisted living   Home Accessibility no concerns   Functional Level   Prior Functional Level Comment ADL I. A with cleaning, laundry, meds. Used a 3 wheeled walker in the apt and electric WC outside of the apt. 3 meals are provided   General Information   Onset of Illness/Injury or Date of Surgery - Date 09/01/19   Referring Physician Caitlyn   Patient/Family Goals Statement Return to [place]   Additional Occupational Profile Info/Pertinent History of Current Problem 85 yo F presenting with nausea, vomiting, wealkness, and L facial pain for last 3 days. PMH: COPD, HTN, CVA/TIA, mild cognitive impairment, spinal stenosis, R chronic hip pain   Cognitive Status Examination   Orientation orientation to person, place and time   Level of Consciousness alert   Pain Assessment   Patient Currently in Pain   (R hip and neck)   Upper Body Dressing   Level of [place]: Dress Upper Body stand-by assist   Eating/Self Feeding   Level of [place]: Eating independent   Activities of Daily Living Analysis   Impairments Contributing to Impaired Activities of Daily Living flexibility decreased;pain   General Therapy Interventions   Planned Therapy Interventions IADL retraining;bed mobility training;transfer training   Clinical Impression   Criteria for Skilled Therapeutic Interventions Met yes, treatment indicated   OT Diagnosis ADL impairment and related mobility   Influenced by the following impairments weakness   Assessment of Occupational Performance 1-3 Performance Deficits   Identified Performance Deficits dressing, toileting, standing   Clinical Decision Making (Complexity) Low complexity   Therapy Frequency Daily   Predicted Duration of Therapy Intervention (days/wks) 2 days   Anticipated Discharge Disposition Home with Assist   Risks and Benefits of Treatment have  "been explained. Yes   Patient, Family & other staff in agreement with plan of care Yes   [facility] AM-PAC  \"6 Clicks\" Daily Activity Inpatient Short Form   1. Putting on and taking off regular lower body clothing? 3 - A Little   2. Bathing (including washing, rinsing, drying)? 3 - A Little   3. Toileting, which includes using toilet, bedpan or urinal? 3 - A Little   4. Putting on and taking off regular upper body clothing? 4 - None   5. Taking care of personal grooming such as brushing teeth? 4 - None   6. Eating meals? 4 - None   Daily Activity Raw Score (Score out of 24.Lower scores equate to lower levels of function) 21   Total Evaluation Time   Total Evaluation Time (Minutes) 15     "

## 2019-09-02 NOTE — PLAN OF CARE
Blood pressures elevated, afebrile. Denies pain this morning.  Takes pills in applesauce.  States she had a nightmare otherwise slept well. Incontinent of urine.  IV antibiotics given.  Plan of care reviewed with patient.

## 2019-09-02 NOTE — PROVIDER NOTIFICATION
Provider Notification     Message: 2304 J. M. - Trop elevated this AM at 0.046. Prior trop level was <0.015.    Cosmo paged at 1809.    Orders: Caitlyn entered stat EKG and will see patient.

## 2019-09-02 NOTE — PROVIDER NOTIFICATION
Chalo GUPTA paged around midnight to notify of elevated blood pressure.  No new orders regarding blood pressure at this time.

## 2019-09-02 NOTE — PLAN OF CARE
OT SUMMARY D/C:  Patient plan for D/C: [place]    Current Status:   Initial eval initiated. Pt is declining to get out of bed at this time    Barriers to return to prior living situation:   none    Recommendation for D/C:  Assume she will be functionally capable    Rationale for D/C recommendations:  No physical impairments noted    Natividad Mancini OTR

## 2019-09-02 NOTE — PLAN OF CARE
PT:  attempted to initiate PT however pt declined stating she was too weak and her hip and neck were bothering her.  Will try again tomorrow.

## 2019-09-03 ENCOUNTER — APPOINTMENT (OUTPATIENT)
Dept: PHYSICAL THERAPY | Facility: CLINIC | Age: 84
DRG: 154 | End: 2019-09-03
Payer: COMMERCIAL

## 2019-09-03 LAB
ALBUMIN SERPL-MCNC: 2.8 G/DL (ref 3.4–5)
ALP SERPL-CCNC: 61 U/L (ref 40–150)
ALT SERPL W P-5'-P-CCNC: 12 U/L (ref 0–50)
ANION GAP SERPL CALCULATED.3IONS-SCNC: 6 MMOL/L (ref 3–14)
AST SERPL W P-5'-P-CCNC: 12 U/L (ref 0–45)
BILIRUB SERPL-MCNC: 0.4 MG/DL (ref 0.2–1.3)
BUN SERPL-MCNC: 21 MG/DL (ref 7–30)
CALCIUM SERPL-MCNC: 8.6 MG/DL (ref 8.5–10.1)
CHLORIDE SERPL-SCNC: 106 MMOL/L (ref 94–109)
CO2 SERPL-SCNC: 31 MMOL/L (ref 20–32)
CREAT SERPL-MCNC: 1.22 MG/DL (ref 0.52–1.04)
ERYTHROCYTE [DISTWIDTH] IN BLOOD BY AUTOMATED COUNT: 13.5 % (ref 10–15)
GFR SERPL CREATININE-BSD FRML MDRD: 40 ML/MIN/{1.73_M2}
GLUCOSE SERPL-MCNC: 89 MG/DL (ref 70–99)
HCT VFR BLD AUTO: 31.9 % (ref 35–47)
HGB BLD-MCNC: 9.9 G/DL (ref 11.7–15.7)
MCH RBC QN AUTO: 30 PG (ref 26.5–33)
MCHC RBC AUTO-ENTMCNC: 31 G/DL (ref 31.5–36.5)
MCV RBC AUTO: 97 FL (ref 78–100)
PLATELET # BLD AUTO: 179 10E9/L (ref 150–450)
POTASSIUM SERPL-SCNC: 3.1 MMOL/L (ref 3.4–5.3)
POTASSIUM SERPL-SCNC: 3.6 MMOL/L (ref 3.4–5.3)
PROT SERPL-MCNC: 5.3 G/DL (ref 6.8–8.8)
RBC # BLD AUTO: 3.3 10E12/L (ref 3.8–5.2)
SODIUM SERPL-SCNC: 143 MMOL/L (ref 133–144)
TROPONIN I SERPL-MCNC: 0.04 UG/L (ref 0–0.04)
WBC # BLD AUTO: 4.1 10E9/L (ref 4–11)

## 2019-09-03 PROCEDURE — 25000132 ZZH RX MED GY IP 250 OP 250 PS 637: Performed by: PHYSICIAN ASSISTANT

## 2019-09-03 PROCEDURE — 97161 PT EVAL LOW COMPLEX 20 MIN: CPT | Mod: GP

## 2019-09-03 PROCEDURE — 25000132 ZZH RX MED GY IP 250 OP 250 PS 637: Performed by: INTERNAL MEDICINE

## 2019-09-03 PROCEDURE — 25000132 ZZH RX MED GY IP 250 OP 250 PS 637: Performed by: FAMILY MEDICINE

## 2019-09-03 PROCEDURE — 40000275 ZZH STATISTIC RCP TIME EA 10 MIN

## 2019-09-03 PROCEDURE — 25000128 H RX IP 250 OP 636: Performed by: FAMILY MEDICINE

## 2019-09-03 PROCEDURE — 80053 COMPREHEN METABOLIC PANEL: CPT | Performed by: FAMILY MEDICINE

## 2019-09-03 PROCEDURE — 85027 COMPLETE CBC AUTOMATED: CPT | Performed by: FAMILY MEDICINE

## 2019-09-03 PROCEDURE — 36415 COLL VENOUS BLD VENIPUNCTURE: CPT | Performed by: FAMILY MEDICINE

## 2019-09-03 PROCEDURE — 12000000 ZZH R&B MED SURG/OB

## 2019-09-03 PROCEDURE — 99233 SBSQ HOSP IP/OBS HIGH 50: CPT | Performed by: FAMILY MEDICINE

## 2019-09-03 PROCEDURE — 25800030 ZZH RX IP 258 OP 636: Performed by: FAMILY MEDICINE

## 2019-09-03 PROCEDURE — 84132 ASSAY OF SERUM POTASSIUM: CPT | Performed by: FAMILY MEDICINE

## 2019-09-03 PROCEDURE — 84484 ASSAY OF TROPONIN QUANT: CPT | Performed by: FAMILY MEDICINE

## 2019-09-03 PROCEDURE — 25000125 ZZHC RX 250: Performed by: EMERGENCY MEDICINE

## 2019-09-03 PROCEDURE — 97530 THERAPEUTIC ACTIVITIES: CPT | Mod: GP

## 2019-09-03 RX ORDER — FUROSEMIDE 10 MG/ML
20 INJECTION INTRAMUSCULAR; INTRAVENOUS ONCE
Status: COMPLETED | OUTPATIENT
Start: 2019-09-03 | End: 2019-09-03

## 2019-09-03 RX ORDER — POTASSIUM CL/LIDO/0.9 % NACL 10MEQ/0.1L
10 INTRAVENOUS SOLUTION, PIGGYBACK (ML) INTRAVENOUS
Status: DISCONTINUED | OUTPATIENT
Start: 2019-09-03 | End: 2019-09-06 | Stop reason: HOSPADM

## 2019-09-03 RX ORDER — POTASSIUM CHLORIDE 1.5 G/1.58G
20-40 POWDER, FOR SOLUTION ORAL
Status: DISCONTINUED | OUTPATIENT
Start: 2019-09-03 | End: 2019-09-06 | Stop reason: HOSPADM

## 2019-09-03 RX ORDER — POTASSIUM CHLORIDE 7.45 MG/ML
10 INJECTION INTRAVENOUS
Status: DISCONTINUED | OUTPATIENT
Start: 2019-09-03 | End: 2019-09-06 | Stop reason: HOSPADM

## 2019-09-03 RX ORDER — POTASSIUM CHLORIDE 1500 MG/1
20-40 TABLET, EXTENDED RELEASE ORAL
Status: DISCONTINUED | OUTPATIENT
Start: 2019-09-03 | End: 2019-09-06 | Stop reason: HOSPADM

## 2019-09-03 RX ADMIN — SODIUM CHLORIDE, POTASSIUM CHLORIDE, SODIUM LACTATE AND CALCIUM CHLORIDE: 600; 310; 30; 20 INJECTION, SOLUTION INTRAVENOUS at 01:01

## 2019-09-03 RX ADMIN — ACETAMINOPHEN 1000 MG: 500 TABLET, FILM COATED ORAL at 09:41

## 2019-09-03 RX ADMIN — CLINDAMYCIN IN 5 PERCENT DEXTROSE 900 MG: 18 INJECTION, SOLUTION INTRAVENOUS at 16:13

## 2019-09-03 RX ADMIN — OXYCODONE HYDROCHLORIDE 5 MG: 5 TABLET ORAL at 19:40

## 2019-09-03 RX ADMIN — CEFUROXIME SODIUM 1 G: 1.5 INJECTION, POWDER, FOR SOLUTION INTRAVENOUS at 05:48

## 2019-09-03 RX ADMIN — ATORVASTATIN CALCIUM 40 MG: 20 TABLET, FILM COATED ORAL at 09:41

## 2019-09-03 RX ADMIN — ENOXAPARIN SODIUM 40 MG: 40 INJECTION SUBCUTANEOUS at 09:48

## 2019-09-03 RX ADMIN — FLUTICASONE FUROATE AND VILANTEROL TRIFENATATE 1 PUFF: 100; 25 POWDER RESPIRATORY (INHALATION) at 09:47

## 2019-09-03 RX ADMIN — PROCHLORPERAZINE EDISYLATE 5 MG: 5 INJECTION INTRAMUSCULAR; INTRAVENOUS at 08:35

## 2019-09-03 RX ADMIN — FELODIPINE 10 MG: 10 TABLET, EXTENDED RELEASE ORAL at 09:47

## 2019-09-03 RX ADMIN — Medication 200 MG: at 09:46

## 2019-09-03 RX ADMIN — FUROSEMIDE 20 MG: 10 INJECTION, SOLUTION INTRAMUSCULAR; INTRAVENOUS at 12:54

## 2019-09-03 RX ADMIN — MICONAZOLE NITRATE: 20 POWDER TOPICAL at 19:40

## 2019-09-03 RX ADMIN — METRONIDAZOLE 500 MG: 500 INJECTION, SOLUTION INTRAVENOUS at 06:24

## 2019-09-03 RX ADMIN — DULOXETINE HYDROCHLORIDE 30 MG: 30 CAPSULE, DELAYED RELEASE ORAL at 21:38

## 2019-09-03 RX ADMIN — POTASSIUM CHLORIDE 20 MEQ: 20 TABLET, EXTENDED RELEASE ORAL at 13:47

## 2019-09-03 RX ADMIN — METRONIDAZOLE 500 MG: 500 INJECTION, SOLUTION INTRAVENOUS at 18:48

## 2019-09-03 RX ADMIN — FUROSEMIDE 40 MG: 40 TABLET ORAL at 09:41

## 2019-09-03 RX ADMIN — GABAPENTIN 200 MG: 100 CAPSULE ORAL at 19:40

## 2019-09-03 RX ADMIN — DONEPEZIL HYDROCHLORIDE 5 MG: 5 TABLET ORAL at 09:42

## 2019-09-03 RX ADMIN — METRONIDAZOLE 500 MG: 500 INJECTION, SOLUTION INTRAVENOUS at 12:58

## 2019-09-03 RX ADMIN — CEFUROXIME SODIUM 1 G: 1.5 INJECTION, POWDER, FOR SOLUTION INTRAVENOUS at 22:14

## 2019-09-03 RX ADMIN — POTASSIUM CHLORIDE 40 MEQ: 20 TABLET, EXTENDED RELEASE ORAL at 11:24

## 2019-09-03 RX ADMIN — CLINDAMYCIN IN 5 PERCENT DEXTROSE 900 MG: 18 INJECTION, SOLUTION INTRAVENOUS at 07:32

## 2019-09-03 RX ADMIN — OXYCODONE HYDROCHLORIDE 10 MG: 10 TABLET, FILM COATED, EXTENDED RELEASE ORAL at 19:40

## 2019-09-03 RX ADMIN — OMEPRAZOLE 40 MG: 20 CAPSULE, DELAYED RELEASE ORAL at 09:40

## 2019-09-03 RX ADMIN — ASPIRIN 81 MG 81 MG: 81 TABLET ORAL at 09:41

## 2019-09-03 RX ADMIN — DULOXETINE HYDROCHLORIDE 60 MG: 30 CAPSULE, DELAYED RELEASE ORAL at 09:41

## 2019-09-03 RX ADMIN — ACETAMINOPHEN 1000 MG: 500 TABLET, FILM COATED ORAL at 19:39

## 2019-09-03 RX ADMIN — LISINOPRIL 20 MG: 20 TABLET ORAL at 09:42

## 2019-09-03 RX ADMIN — CEFUROXIME SODIUM 1 G: 1.5 INJECTION, POWDER, FOR SOLUTION INTRAVENOUS at 15:25

## 2019-09-03 RX ADMIN — CLINDAMYCIN IN 5 PERCENT DEXTROSE 900 MG: 18 INJECTION, SOLUTION INTRAVENOUS at 22:49

## 2019-09-03 RX ADMIN — GABAPENTIN 200 MG: 100 CAPSULE ORAL at 09:41

## 2019-09-03 RX ADMIN — METRONIDAZOLE 500 MG: 500 INJECTION, SOLUTION INTRAVENOUS at 00:55

## 2019-09-03 RX ADMIN — FUROSEMIDE 40 MG: 40 TABLET ORAL at 19:40

## 2019-09-03 RX ADMIN — MICONAZOLE NITRATE: 20 POWDER TOPICAL at 09:42

## 2019-09-03 RX ADMIN — ACETAMINOPHEN 1000 MG: 500 TABLET, FILM COATED ORAL at 13:47

## 2019-09-03 RX ADMIN — ONDANSETRON 4 MG: 2 INJECTION INTRAMUSCULAR; INTRAVENOUS at 07:42

## 2019-09-03 RX ADMIN — OXYCODONE HYDROCHLORIDE 10 MG: 10 TABLET, FILM COATED, EXTENDED RELEASE ORAL at 09:41

## 2019-09-03 RX ADMIN — LISINOPRIL 20 MG: 20 TABLET ORAL at 19:40

## 2019-09-03 ASSESSMENT — ACTIVITIES OF DAILY LIVING (ADL)
ADLS_ACUITY_SCORE: 23
ADLS_ACUITY_SCORE: 22
ADLS_ACUITY_SCORE: 23
ADLS_ACUITY_SCORE: 23

## 2019-09-03 ASSESSMENT — MIFFLIN-ST. JEOR: SCORE: 1289.13

## 2019-09-03 NOTE — PROGRESS NOTES
Patient awake and alert. Trialed room air. Patient's O2 sats jumping between 87-91% on room air. Patient placed back on 2L via nasal cannula to maintain sats above 90%.

## 2019-09-03 NOTE — PROGRESS NOTES
09/03/19 1600   Quick Adds   Type of Visit Initial PT Evaluation   Living Environment   Lives With facility resident   Living Arrangements assisted living   Home Accessibility no concerns   Living Environment Comment PT: Patient lives in an [facility] with elevator.    Self-Care   Usual Activity Tolerance moderate   Current Activity Tolerance poor   Equipment Currently Used at Home walker, rolling;other (see comments)  (motorized scooter)   Functional Level Prior   Ambulation 1-->assistive equipment   Transferring 0-->independent   Toileting 0-->independent   Bathing 3-->assistive equipment and person   Communication 0-->understands/communicates without difficulty   Swallowing 0-->swallows foods/liquids without difficulty   Cognition 0 - no cognition issues reported   Fall history within last six months yes   Number of times patient has fallen within last six months 1   Which of the above functional risks had a recent onset or change? ambulation;transferring;toileting;bathing;dressing   Prior Functional Level Comment PT: Per patient limited mobilty at baseline. Patient uses  motorized scooter for going down to cafeteria, otherwise will use FWW for very short distance mobility within her room. Patient was IND with dressing and toileting prior to admission, got services for showering.   General Information   Onset of Illness/Injury or Date of Surgery - Date 09/01/19   Referring Physician Vinny Crespo PA-C   Patient/Family Goals Statement To return to [facility]   Pertinent History of Current Problem (include personal factors and/or comorbidities that impact the POC) Jazmin Clifton is a 86 year old female with a complex past medical history which includes hypertension, CKD stage III, COPD, cerebral aneurysm status post repair, CAD, mild cognitive impairment, who presents for evaluation of nausea, vomiting, and generalized weakness of 3 days duration.  Of note she also reports swelling in the left side of her neck which she  has has been present for 3 to 4 days.  Also complains of mild abdominal discomfort across her lower abdomen bilaterally.  She says her vomiting is nonbloody, nonbilious, has been occurring 3-4 times per day.  Has not been able to keep any solid food down at all today.  Last bowel movement today, one episode of diarrhea yesterday.  States she had felt flushed, and diaphoretic earlier today.  Was told she does not have a measured temperature from her care facility.  Denies any chest pain, shortness of breath, difficulty or discomfort with swallowing, difficulty opening her mouth, sensation of tightness in her neck or throat.    Precautions/Limitations fall precautions   Heart Disease Risk Factors High blood pressure;Lack of physical activity;Dislipidemia;Overweight;Stress;Medical history;Age   General Observations PIV in RUE, 2L O2 via NC   General Info Comments PT: Activity: Up with assist   Cognitive Status Examination   Orientation orientation to person, place and time   Level of Consciousness lethargic/somnolent   Follows Commands and Answers Questions 100% of the time;able to follow multistep instructions   Personal Safety and Judgment intact   Memory intact   Pain Assessment   Patient Currently in Pain Yes, see Vital Sign flowsheet   Integumentary/Edema   Integumentary/Edema no deficits were identifed   Posture    Posture Forward head position;Protracted shoulders;Kyphosis   Range of Motion (ROM)   ROM Comment PT: Not formally tested, WNL for observed mobility   Strength   Strength Comments PT: Not formally assessed, globally at least 3+/5 per observed mobility. Decreased strength on RLE 2/2 R knee and hip pain.   Bed Mobility   Bed Mobility Comments PT: Patient light Star for supine<>sit EOB, moves very slowly. Needing light Star for upper body only at end of transfer   Transfer Skills   Transfer Comments PT: Star-CGA for sit<>stand slowly from bed and toilet   Gait   Gait Comments PT: Patient ambulated ~5'   "very slowly with CGA and use of FWW. Decreased step length on LLE, dificulty moving RLE.   Balance   Balance Comments PT: Imparied standing balance, needs CGA and FWW.   General Therapy Interventions   Planned Therapy Interventions balance training;bed mobility training;gait training;neuromuscular re-education;strengthening;transfer training;risk factor education;home program guidelines;progressive activity/exercise   Clinical Impression   Criteria for Skilled Therapeutic Intervention yes, treatment indicated   PT Diagnosis Impaired functional mobility   Influenced by the following impairments Pain, medical, decreased strength and actiivty tolerace   Functional limitations due to impairments Gait, bed mobility, transfers, ADLs   Clinical Presentation Stable/Uncomplicated   Clinical Presentation Rationale Low mobilty status at baseline, clinical judgment   Clinical Decision Making (Complexity) Low complexity   Therapy Frequency Daily   Predicted Duration of Therapy Intervention (days/wks) 4 days   Anticipated Discharge Disposition Home with Assist;Transitional Care Facility   Risk & Benefits of therapy have been explained Yes   Patient, Family & other staff in agreement with plan of care Yes   Clinical Impression Comments PT evaluation complete,treatment initiated.   [facility] [employer]  \"6 Clicks\"   2016, Trustees of [facility], under license to [employer].  All rights reserved.   6 Clicks Short Forms Basic Mobility Inpatient Short Form   [facility] AM-PAC  \"6 Clicks\" V.2 Basic Mobility Inpatient Short Form   1. Turning from your back to your side while in a flat bed without using bedrails? 3 - A Little   2. Moving from lying on your back to sitting on the side of a flat bed without using bedrails? 3 - A Little   3. Moving to and from a bed to a chair (including a wheelchair)? 3 - A Little   4. Standing up from a chair using your arms (e.g., wheelchair, or bedside chair)? 3 - A Little   5. To " walk in hospital room? 2 - A Lot   6. Climbing 3-5 steps with a railing? 1 - Total   Basic Mobility Raw Score (Score out of 24.Lower scores equate to lower levels of function) 15   Total Evaluation Time   Total Evaluation Time (Minutes) 5

## 2019-09-03 NOTE — PLAN OF CARE
Vitals stable; one elevated blood pressure, home blood pressure medications given. Pain in right hip with movement.  Oxycontin given; plan to place lidocaine patch with next brief change.  Patient not wanting to reposition for placement at this time.  Ate very little. Took pills in applesauce well.  Assist of 2 with gait belt and walker to move to bed. Plan of care reviewed with patient.

## 2019-09-03 NOTE — PROGRESS NOTES
[facility]      Subjective:  Saliva gland on left is smaller  No pain  Bad taste in mouth--makes her gag  No abd pain  Denies pain  No difficulty breathing but hypoxic, no home oxygen use    Review of Systems:  CONSTITUTIONAL: NEGATIVE for fever, chills, change in weight  INTEGUMENTARY/SKIN: NEGATIVE for worrisome rashes, moles or lesions  EYES: NEGATIVE for vision changes or irritation  ENT/MOUTH: above  RESP:above  BREAST: NEGATIVE for masses, tenderness or discharge  CV: NEGATIVE for chest pain, palpitations or peripheral edema  GI: above  : NEGATIVE for frequency, dysuria, or hematuria  MUSCULOSKELETAL: NEGATIVE for significant arthralgias or myalgia  NEURO: NEGATIVE for weakness, dizziness or paresthesias  ENDOCRINE: NEGATIVE for temperature intolerance, skin/hair changes  HEME: NEGATIVE for bleeding problems  PSYCHIATRIC: NEGATIVE for changes in mood or affect    Physical Exam:  Vitals Were Reviewed    Patient Vitals for the past 16 hrs:   BP Temp Temp src Pulse Heart Rate Resp SpO2 Weight   09/03/19 1053 -- -- -- -- -- -- 92 % --   09/03/19 1052 -- -- -- -- -- -- (!) 88 % --   09/03/19 0831 (!) 169/74 98.7  F (37.1  C) Oral 61 -- 18 90 % --   09/03/19 0613 -- -- -- -- -- -- -- 88 kg (194 lb 0.1 oz)   09/03/19 0325 115/42 97.5  F (36.4  C) Oral -- 57 18 93 % --   09/02/19 2336 121/42 98.4  F (36.9  C) Oral -- 60 18 91 % --   09/02/19 1940 (!) 148/59 98.2  F (36.8  C) Oral 66 -- 18 94 % --         Intake/Output Summary (Last 24 hours) at 9/3/2019 1105  Last data filed at 9/3/2019 0600  Gross per 24 hour   Intake 766 ml   Output --   Net 766 ml       GENERAL APPEARANCE: alert, coherent, nad  EYES: conjunctiva clear, eyes grossly normal  HENT: swollen , tender left submandibular gland, reportedly less swollen  RESP: crackles right base, no distress  CV: regular rate and rhythm, normal S1 S2, no S3 or S4 and no murmur, click or rub   ABDOMEN: soft, nontender, no HSM or masses and bowel  sounds normal  MS: no clubbing, cyanosis; tr edema  SKIN: clear without significant rashes or lesions  NEURO: diffusely weak    Lab:  Recent Labs   Lab Test 09/03/19  0529 09/02/19  1037 09/02/19  0603     --  142   POTASSIUM 3.1*  --  3.4   CHLORIDE 106  --  105   CO2 31  --  31   ANIONGAP 6  --  6   GLC 89  --  100*   BUN 21  --  24   CR 1.22* 1.26* 1.23*   BLAIR 8.6  --  9.6     CBC RESULTS:   Recent Labs   Lab Test 09/03/19 0529 09/02/19  1037 09/02/19  0603   WBC 4.1  --  6.4   RBC 3.30*  --  3.73*   HGB 9.9*  --  11.1*   HCT 31.9*  --  35.8    200 204       Results for orders placed or performed during the hospital encounter of 09/01/19 (from the past 24 hour(s))   Methicillin Resistant Staph Aureus PCR   Result Value Ref Range    Specimen Description Nares     Methicillin Resist/Sens S. aureus PCR Negative NEG^Negative   Comprehensive metabolic panel   Result Value Ref Range    Sodium 143 133 - 144 mmol/L    Potassium 3.1 (L) 3.4 - 5.3 mmol/L    Chloride 106 94 - 109 mmol/L    Carbon Dioxide 31 20 - 32 mmol/L    Anion Gap 6 3 - 14 mmol/L    Glucose 89 70 - 99 mg/dL    Urea Nitrogen 21 7 - 30 mg/dL    Creatinine 1.22 (H) 0.52 - 1.04 mg/dL    GFR Estimate 40 (L) >60 mL/min/[1.73_m2]    GFR Estimate If Black 46 (L) >60 mL/min/[1.73_m2]    Calcium 8.6 8.5 - 10.1 mg/dL    Bilirubin Total 0.4 0.2 - 1.3 mg/dL    Albumin 2.8 (L) 3.4 - 5.0 g/dL    Protein Total 5.3 (L) 6.8 - 8.8 g/dL    Alkaline Phosphatase 61 40 - 150 U/L    ALT 12 0 - 50 U/L    AST 12 0 - 45 U/L   CBC with platelets   Result Value Ref Range    WBC 4.1 4.0 - 11.0 10e9/L    RBC Count 3.30 (L) 3.8 - 5.2 10e12/L    Hemoglobin 9.9 (L) 11.7 - 15.7 g/dL    Hematocrit 31.9 (L) 35.0 - 47.0 %    MCV 97 78 - 100 fl    MCH 30.0 26.5 - 33.0 pg    MCHC 31.0 (L) 31.5 - 36.5 g/dL    RDW 13.5 10.0 - 15.0 %    Platelet Count 179 150 - 450 10e9/L   Troponin I   Result Value Ref Range    Troponin I ES 0.035 0.000 - 0.045 ug/L       Assessment and Plan:      Acute left submandibular sialadenitis.  She was started on Rocephin but changed to  cefuroxime and flagyl per UTD. Negative- MRSA swab positive.   .  Will use heat and massage and IVF hydration plus lemon drops.  ENT is not available but will call for advice.   Presume antibiotic should cover this for now.  She may ultimately need probing of her duct.   On review of CT there may be more dilatation of the duct than reflected in the report. Dr Brady reviewed with ENT yesterday who recommended conservative rx.     Wbc and temp normal. Reportedly better     Nausea, vomiting, weakness. Possibly secondary to the sialadenitis since it started at the same time. CT abd wo new pathology. Appears that foul discharge in mouth makes her gag.      Possible urinary tract infection.  She had 34 WBCs per high-powered field and has some worsening incontinence.  We will check bladder scans, but the Rocephin started for the sialadenitis should also cover this while cultures are pending. UC still pending 09/03/19.      Slight elevation in troponin. trops 0.015--0.046--0.042--0.035   No EKG changes and no chest pains so I suspect this is not acute coronary syndrome and is strain related.      Chronic kidney disease.  She is approximately at baseline with a creatinine of 1.34.   Now creat is 1.22.     Hypertension.  She was quite hypertensive when she came in.  It has come down now to reasonable levels.   Will use labetalol p.r.n. blood pressures greater than 170/105.      Chronic obstructive pulmonary disease with chronic hypercapnia.  hypoxia -but no distress  Ph 7.4/59 on 9/2/19. Lung volumes low and mild pul vascular congestion on cxr 9/2/19 . Suspect hypoxia is due to chf on top of chronic copd    CHF/diastolic dysfunction--with acute exacerbation  Stopping iv fluids due to hypoxia, continue pta lasix 40 bid and give additional lasix 20 iv.  cxr tomorrow.     Abdominal pain.  It is unclear if this is significant, but she did have  some tenderness. CT-1. Colon diverticulosis without CT evidence of acute diverticulitis.  2. 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      Right hip pain.  This is her primary pain complaint.  This has been ongoing and she has had ongoing steroid injections for this.  It seems like this is more trochanteric bursitis but it is unclear since [facility] and I cannot get those records.  At this point, she has been on OxyContin and will continue that, plus Tylenol plus oxycodone as needed, but try to use that sparingly.      CODE STATUS:  DNR/DNI, per POLST.      PROPHYLAXIS:  Because she is ill, will go ahead and use Lovenox.      DISPOSITION:  She needs inpatient care for treatment of these acute illnesses. Will need to see ENT, Dr Ruel Hernandez after discharge.  From [facility]-may need higher level of care. Stop fluids, additional lasix-follow oxygen. cxr in AM. Continue atb's. Gagging probably due to purulent discharge in mouth.    11:40 AM discussed with son Chi. He thinks she needs a higher level of care such as tcu upon dc. He says she is a complainer and gags easily.

## 2019-09-03 NOTE — PLAN OF CARE
Patient alert and orientated. Vital signs stable. Afebrile. Patient reports right hip and back pain; scheduled tylenol and oxycontin given.      Patient nauseous and dry heaving this morning. Patient said she had an awful taste that was making her gag. She also said it felt like something was stuck in her throat. PRN IV zofran and compazine given. Lump on neck feels less swollen compared to yesterday. Patient improved throughout the day. She tolerated her lunch and denied any nausea. Scheduled IV antibiotics given.    Patient is on 2L of oxygen via nasal cannula. Trialed room air, however patient was unable to maintain a steady O2 sat above 90%      Patient continues to be incontinent of urine. New today, patient was also incontinent of stool. She had 4 soft to loose/watery BMs. MD paged.      Patient also continues to be weak, but got up in the chair twice this shift with assist of 2 with walker. Patient stated at baseline she only walks around her apartment with a walker, otherwise she uses a motor scooter for longer distances. Patient also reported dizziness with positional changes.      Potassium level was 3.1 and was replaced per protocol. Redraw level was 3.6.

## 2019-09-03 NOTE — PROVIDER NOTIFICATION
Provider Notification     Message: 7545 J. M. - Patient has developed watery loose stools x4. Patient receiving 3 different antibiotics via IV. Do you want precautions and a sample collected? Thanks!    Caitlyn paged at [phone].    Orders: MD entered orders.

## 2019-09-03 NOTE — PLAN OF CARE
Discharge Planner PT   Patient plan for discharge: Return to senior care  Current status: PT evaluation complete, treatment initiated. Patient very light Star for upper body to complete supine<>sit, moves very slowly 2/2 fatigue and pain. Patient transfers sit<> stand with CGA-Satr from bed/ toilet. Patient MaxA for managing breifs and pericares. Patient ambulated ~5' total with FWW, CGA.Ambulates very slowly ,antalgic gait pattern and slightly unsteady.     Patient uses FWW for short distances and motor scooter for longer distances at baseline.Patient currently below baseline LOF.  Barriers to return to prior living situation: Medical, level of assist, decreased strength, activity tolerance and safety with mobility.  Recommendations for discharge: TCU vs. Return to TCU with increased services.   Rationale for recommendations: Patient would benefit from TCU stay for PT/OT to improve strength, balance and activity tolerance for functional mobility.       Entered by: Carmen Luna 09/03/2019 4:40 PM

## 2019-09-03 NOTE — PLAN OF CARE
Patient's son, Chi, called the unit concerned that his mother sounded confused when he called her.  Called Chi back and gave update on patient's status.  Patient is tired, but when woken is alert and oriented. Encouraged Chi to call if he has any other questions or concerns.

## 2019-09-03 NOTE — PLAN OF CARE
"Patient has pain in right hip with any movement, however, when offered pain medication patient refused. Lidocaine patch in place on hip.    Patient changed q 2-3 hrs as needed.    Sonny gtts @ bedside. Patient was offered warm cynthia w/a but refused, 'I am going to fall asleep.\"  "

## 2019-09-03 NOTE — CONSULTS
CARE TRANSITION SOCIAL WORK INITIAL ASSESSMENT:      Met with: Patient.    DATA  Active Problems:    Weakness    Sialadenitis       Primary Care [facility] Name: ([facility])  Primary Care MD Name: (Delores)  Contact information and PCP information verified: Yes      ASSESSMENT  Cognitive Status: awake, alert and oriented.       Resources List: Transitional Care, Skilled Nursing Facility, Home Care              Description of Support System: Supportive, Involved   Who is your support system?: Children, Facility resident(s)/Staff   Support Assessment: Adequate family and caregiver support, Adequate social supports   Insurance Concerns: No Insurance issues identified  Living Arrangements: assisted living        This writer met with pt introduced self and role. Discussed discharge planning and medicare guidelines in regards to home care and SNF benefits. Pt reports that she lives at [facility] (phone: [phone] Fax: [phone]). She recieves help with bathing, dressing, grooming, escorts, laundry and meals. Pt feels that she will be able to return home. However; son feels that pt will need TCU per MD, this writer has a call out to son, Chi.     Patient was provided with Medicare certified nursing home list. [facility] (Phone: [phone] Fax: [phone]).  Referral pending, therapy to asses.     Pt does not require 3 night stay for TCU, but does require both therapies to assess.     Pt has REDDY Weldon MA [facility] Care Coordinator   [phone]    -she has been notified of pts admission.         PLAN    TERRA vs TCU        Jazmine Ruiz MSW, LICSW, [phone]

## 2019-09-04 ENCOUNTER — APPOINTMENT (OUTPATIENT)
Dept: GENERAL RADIOLOGY | Facility: CLINIC | Age: 84
DRG: 154 | End: 2019-09-04
Attending: FAMILY MEDICINE
Payer: COMMERCIAL

## 2019-09-04 LAB
ANION GAP SERPL CALCULATED.3IONS-SCNC: 4 MMOL/L (ref 3–14)
BACTERIA SPEC CULT: NORMAL
BUN SERPL-MCNC: 17 MG/DL (ref 7–30)
C DIFF TOX B STL QL: NEGATIVE
CALCIUM SERPL-MCNC: 8.7 MG/DL (ref 8.5–10.1)
CHLORIDE SERPL-SCNC: 111 MMOL/L (ref 94–109)
CO2 SERPL-SCNC: 30 MMOL/L (ref 20–32)
CREAT SERPL-MCNC: 1.26 MG/DL (ref 0.52–1.04)
ERYTHROCYTE [DISTWIDTH] IN BLOOD BY AUTOMATED COUNT: 13.7 % (ref 10–15)
GFR SERPL CREATININE-BSD FRML MDRD: 38 ML/MIN/{1.73_M2}
GLUCOSE SERPL-MCNC: 99 MG/DL (ref 70–99)
HCT VFR BLD AUTO: 33 % (ref 35–47)
HGB BLD-MCNC: 10.3 G/DL (ref 11.7–15.7)
Lab: NORMAL
MCH RBC QN AUTO: 30.3 PG (ref 26.5–33)
MCHC RBC AUTO-ENTMCNC: 31.2 G/DL (ref 31.5–36.5)
MCV RBC AUTO: 97 FL (ref 78–100)
PLATELET # BLD AUTO: 174 10E9/L (ref 150–450)
POTASSIUM SERPL-SCNC: 3.4 MMOL/L (ref 3.4–5.3)
RBC # BLD AUTO: 3.4 10E12/L (ref 3.8–5.2)
SODIUM SERPL-SCNC: 145 MMOL/L (ref 133–144)
SPECIMEN SOURCE: NORMAL
SPECIMEN SOURCE: NORMAL
WBC # BLD AUTO: 4.2 10E9/L (ref 4–11)

## 2019-09-04 PROCEDURE — 25800030 ZZH RX IP 258 OP 636: Performed by: FAMILY MEDICINE

## 2019-09-04 PROCEDURE — 85027 COMPLETE CBC AUTOMATED: CPT | Performed by: FAMILY MEDICINE

## 2019-09-04 PROCEDURE — 25000132 ZZH RX MED GY IP 250 OP 250 PS 637: Performed by: FAMILY MEDICINE

## 2019-09-04 PROCEDURE — 25000132 ZZH RX MED GY IP 250 OP 250 PS 637: Performed by: PHYSICIAN ASSISTANT

## 2019-09-04 PROCEDURE — 99233 SBSQ HOSP IP/OBS HIGH 50: CPT | Performed by: FAMILY MEDICINE

## 2019-09-04 PROCEDURE — 25000125 ZZHC RX 250: Performed by: EMERGENCY MEDICINE

## 2019-09-04 PROCEDURE — 80048 BASIC METABOLIC PNL TOTAL CA: CPT | Performed by: FAMILY MEDICINE

## 2019-09-04 PROCEDURE — 87493 C DIFF AMPLIFIED PROBE: CPT | Performed by: FAMILY MEDICINE

## 2019-09-04 PROCEDURE — 25000128 H RX IP 250 OP 636: Performed by: FAMILY MEDICINE

## 2019-09-04 PROCEDURE — 12000000 ZZH R&B MED SURG/OB

## 2019-09-04 PROCEDURE — 25000132 ZZH RX MED GY IP 250 OP 250 PS 637: Performed by: INTERNAL MEDICINE

## 2019-09-04 PROCEDURE — 71045 X-RAY EXAM CHEST 1 VIEW: CPT

## 2019-09-04 PROCEDURE — 36415 COLL VENOUS BLD VENIPUNCTURE: CPT | Performed by: FAMILY MEDICINE

## 2019-09-04 PROCEDURE — 25000131 ZZH RX MED GY IP 250 OP 636 PS 637: Performed by: FAMILY MEDICINE

## 2019-09-04 RX ORDER — FUROSEMIDE 10 MG/ML
20 INJECTION INTRAMUSCULAR; INTRAVENOUS ONCE
Status: COMPLETED | OUTPATIENT
Start: 2019-09-04 | End: 2019-09-04

## 2019-09-04 RX ADMIN — FUROSEMIDE 20 MG: 10 INJECTION, SOLUTION INTRAMUSCULAR; INTRAVENOUS at 11:49

## 2019-09-04 RX ADMIN — ONDANSETRON 4 MG: 4 TABLET, ORALLY DISINTEGRATING ORAL at 14:23

## 2019-09-04 RX ADMIN — Medication 200 MG: at 08:27

## 2019-09-04 RX ADMIN — DULOXETINE HYDROCHLORIDE 30 MG: 30 CAPSULE, DELAYED RELEASE ORAL at 22:06

## 2019-09-04 RX ADMIN — ATORVASTATIN CALCIUM 40 MG: 20 TABLET, FILM COATED ORAL at 08:26

## 2019-09-04 RX ADMIN — OMEPRAZOLE 40 MG: 20 CAPSULE, DELAYED RELEASE ORAL at 08:26

## 2019-09-04 RX ADMIN — ENOXAPARIN SODIUM 40 MG: 40 INJECTION SUBCUTANEOUS at 08:33

## 2019-09-04 RX ADMIN — FLUTICASONE FUROATE AND VILANTEROL TRIFENATATE 1 PUFF: 100; 25 POWDER RESPIRATORY (INHALATION) at 08:32

## 2019-09-04 RX ADMIN — ACETAMINOPHEN 1000 MG: 500 TABLET, FILM COATED ORAL at 14:24

## 2019-09-04 RX ADMIN — ASPIRIN 81 MG 81 MG: 81 TABLET ORAL at 08:26

## 2019-09-04 RX ADMIN — METRONIDAZOLE 500 MG: 500 INJECTION, SOLUTION INTRAVENOUS at 07:45

## 2019-09-04 RX ADMIN — FUROSEMIDE 40 MG: 40 TABLET ORAL at 20:16

## 2019-09-04 RX ADMIN — METRONIDAZOLE 500 MG: 500 INJECTION, SOLUTION INTRAVENOUS at 00:07

## 2019-09-04 RX ADMIN — CLINDAMYCIN IN 5 PERCENT DEXTROSE 900 MG: 18 INJECTION, SOLUTION INTRAVENOUS at 09:12

## 2019-09-04 RX ADMIN — DONEPEZIL HYDROCHLORIDE 5 MG: 5 TABLET ORAL at 08:25

## 2019-09-04 RX ADMIN — GABAPENTIN 200 MG: 100 CAPSULE ORAL at 20:16

## 2019-09-04 RX ADMIN — GABAPENTIN 200 MG: 100 CAPSULE ORAL at 08:25

## 2019-09-04 RX ADMIN — CEFUROXIME SODIUM 1 G: 1.5 INJECTION, POWDER, FOR SOLUTION INTRAVENOUS at 14:49

## 2019-09-04 RX ADMIN — METRONIDAZOLE 500 MG: 500 INJECTION, SOLUTION INTRAVENOUS at 18:57

## 2019-09-04 RX ADMIN — FUROSEMIDE 40 MG: 40 TABLET ORAL at 08:25

## 2019-09-04 RX ADMIN — DULOXETINE HYDROCHLORIDE 60 MG: 30 CAPSULE, DELAYED RELEASE ORAL at 08:27

## 2019-09-04 RX ADMIN — LISINOPRIL 20 MG: 20 TABLET ORAL at 08:25

## 2019-09-04 RX ADMIN — ACETAMINOPHEN 1000 MG: 500 TABLET, FILM COATED ORAL at 08:26

## 2019-09-04 RX ADMIN — LISINOPRIL 20 MG: 20 TABLET ORAL at 20:16

## 2019-09-04 RX ADMIN — CEFUROXIME SODIUM 1 G: 1.5 INJECTION, POWDER, FOR SOLUTION INTRAVENOUS at 22:06

## 2019-09-04 RX ADMIN — METRONIDAZOLE 500 MG: 500 INJECTION, SOLUTION INTRAVENOUS at 13:02

## 2019-09-04 RX ADMIN — OXYCODONE HYDROCHLORIDE 10 MG: 10 TABLET, FILM COATED, EXTENDED RELEASE ORAL at 20:16

## 2019-09-04 RX ADMIN — MICONAZOLE NITRATE: 20 POWDER TOPICAL at 20:31

## 2019-09-04 RX ADMIN — OXYCODONE HYDROCHLORIDE 10 MG: 10 TABLET, FILM COATED, EXTENDED RELEASE ORAL at 08:26

## 2019-09-04 RX ADMIN — FELODIPINE 10 MG: 10 TABLET, EXTENDED RELEASE ORAL at 08:26

## 2019-09-04 RX ADMIN — MICONAZOLE NITRATE: 20 POWDER TOPICAL at 11:53

## 2019-09-04 RX ADMIN — ONDANSETRON 4 MG: 2 INJECTION INTRAMUSCULAR; INTRAVENOUS at 09:01

## 2019-09-04 RX ADMIN — ACETAMINOPHEN 1000 MG: 500 TABLET, FILM COATED ORAL at 20:16

## 2019-09-04 RX ADMIN — CEFUROXIME SODIUM 1 G: 1.5 INJECTION, POWDER, FOR SOLUTION INTRAVENOUS at 07:03

## 2019-09-04 ASSESSMENT — ACTIVITIES OF DAILY LIVING (ADL)
ADLS_ACUITY_SCORE: 22

## 2019-09-04 ASSESSMENT — MIFFLIN-ST. JEOR: SCORE: 1289.13

## 2019-09-04 NOTE — PLAN OF CARE
OT: Attempted x2. Pt nauseated in the AM- RN present. Late AM pt declines OOB activity d/t diarrhea. Will attempt back tomorrow as appropriate.

## 2019-09-04 NOTE — PLAN OF CARE
Patient had incontinent loose, brown stool x 2.  Up to BEDSIDE COMMODE with 2 assist and a walker.  Stool sent to the lab for Clostridium Difficile test.  Patient up to chair for breakfast.  Patient then became nauseated and was coughing up thick clear phlegm; no emesis.  Medicated with zofran 4 mg IV.  Cool wash cloth given and encouraged slow deep breathing.  Left submandibular gland is swollen and firm to touch; patient was gently massaging.  Declined pain medication at this time.

## 2019-09-04 NOTE — PLAN OF CARE
"Pt alert, oriented, assist 2 with walker. Scheduled oxycontin and prn oxycodone given for pain. Nausea x 1 overnight. IV antibiotics given. Incontinent of B & B. Unable to send sample to lab. 1 loose stool overnight. LS diminished. On 2 L O2 via nc. /53 (BP Location: Right arm)   Pulse 68   Temp 98  F (36.7  C) (Oral)   Resp 16   Ht 1.6 m (5' 3\")   Wt 88 kg (194 lb 0.1 oz)   SpO2 93%   BMI 34.37 kg/m      "

## 2019-09-04 NOTE — PROGRESS NOTES
[facility] Hospitalist Service      Subjective:  Anorexic and not eating this am  No abd pain  Loose stool this am--clostridia dif sent  No fever  She thinks swelling in left neck is less  Bad taste in mouth-gags  No difficulty breathing  Needing one liter oxygen    Review of Systems:  CONSTITUTIONAL:anorexic  INTEGUMENTARY/SKIN: NEGATIVE for worrisome rashes, moles or lesions  EYES: NEGATIVE for vision changes or irritation  ENT/MOUTH: above  RESP:no sob  BREAST: NEGATIVE for masses, tenderness or discharge  CV: NEGATIVE for chest pain, palpitations or peripheral edema  GI: above  : NEGATIVE for frequency, dysuria, or hematuria  MUSCULOSKELETAL: NEGATIVE for significant arthralgias or myalgia  NEURO: NEGATIVE for weakness, dizziness or paresthesias  ENDOCRINE: NEGATIVE for temperature intolerance, skin/hair changes  HEME: NEGATIVE for bleeding problems  PSYCHIATRIC: NEGATIVE for changes in mood or affect    Physical Exam:  Vitals Were Reviewed    Patient Vitals for the past 16 hrs:   BP Temp Temp src Pulse Heart Rate Resp SpO2 Weight   09/04/19 0742 136/49 98.3  F (36.8  C) Oral 76 -- 18 91 % --   09/04/19 0608 -- -- -- -- -- -- -- 88 kg (194 lb 0.1 oz)   09/04/19 0356 135/53 98  F (36.7  C) Oral -- 64 -- 93 % --   09/03/19 2345 122/55 -- -- -- -- -- -- --   09/03/19 2300 (!) 100/33 98.5  F (36.9  C) Oral -- 59 16 91 % --   09/03/19 1927 131/57 98.1  F (36.7  C) Oral -- 72 16 90 % --         Intake/Output Summary (Last 24 hours) at 9/4/2019 1034  Last data filed at 9/4/2019 0911  Gross per 24 hour   Intake 120 ml   Output --   Net 120 ml       GENERAL APPEARANCE: healthy, alert and no distress  EYES: conjunctiva clear, eyes grossly normal  HENT: left neck submandibular gland is tender but I believe smaller  RESP: crackles left base  CV: regular rate and rhythm, normal S1 S2, no S3 or S4 and no murmur, click or rub   ABDOMEN: soft, nontender, no HSM or masses and bowel sounds normal  MS: no clubbing, cyanosis;  no edema  SKIN: clear without significant rashes or lesions  NEURO: Normal strength and tone, sensory exam grossly normal, mentation intact and speech normal    Lab:  Recent Labs   Lab Test 09/04/19  0552 09/03/19  1749 09/03/19  0529   *  --  143   POTASSIUM 3.4 3.6 3.1*   CHLORIDE 111*  --  106   CO2 30  --  31   ANIONGAP 4  --  6   GLC 99  --  89   BUN 17  --  21   CR 1.26*  --  1.22*   BLAIR 8.7  --  8.6     CBC RESULTS:   Recent Labs   Lab Test 09/04/19 0552 09/03/19  0529   WBC 4.2 4.1   RBC 3.40* 3.30*   HGB 10.3* 9.9*   HCT 33.0* 31.9*    179       Results for orders placed or performed during the hospital encounter of 09/01/19 (from the past 24 hour(s))   Potassium   Result Value Ref Range    Potassium 3.6 3.4 - 5.3 mmol/L   CBC with platelets   Result Value Ref Range    WBC 4.2 4.0 - 11.0 10e9/L    RBC Count 3.40 (L) 3.8 - 5.2 10e12/L    Hemoglobin 10.3 (L) 11.7 - 15.7 g/dL    Hematocrit 33.0 (L) 35.0 - 47.0 %    MCV 97 78 - 100 fl    MCH 30.3 26.5 - 33.0 pg    MCHC 31.2 (L) 31.5 - 36.5 g/dL    RDW 13.7 10.0 - 15.0 %    Platelet Count 174 150 - 450 10e9/L   Basic metabolic panel   Result Value Ref Range    Sodium 145 (H) 133 - 144 mmol/L    Potassium 3.4 3.4 - 5.3 mmol/L    Chloride 111 (H) 94 - 109 mmol/L    Carbon Dioxide 30 20 - 32 mmol/L    Anion Gap 4 3 - 14 mmol/L    Glucose 99 70 - 99 mg/dL    Urea Nitrogen 17 7 - 30 mg/dL    Creatinine 1.26 (H) 0.52 - 1.04 mg/dL    GFR Estimate 38 (L) >60 mL/min/[1.73_m2]    GFR Estimate If Black 44 (L) >60 mL/min/[1.73_m2]    Calcium 8.7 8.5 - 10.1 mg/dL   XR Chest Port 1 View    Narrative    CHEST SINGLE VIEW PORTABLE  9/4/2019 6:03 AM     HISTORY: Hypoxia.    COMPARISON: 9/2/2019 at 0940 hours.    FINDINGS: Hypoinflated lungs. A band-like opacity in the right lung  base and a small region of patchy opacities in the left lung base,  unchanged. The size of the cardiac silhouette is within normal limits.  Atherosclerotic calcification in the thoracic  aorta.      Impression    IMPRESSION:  1. No convincing interval change since 9/2/2019 at 0940 hours.  2. Hypoinflated lungs. A band-like opacity in the right lung base and  a small region of patchy opacities in the left lung base most likely  represent atelectasis. Pneumonia cannot be excluded.    VERONA CANTU MD       Assessment and Plan:     Acute left submandibular sialadenitis.  She was started on Rocephin but changed to  cefuroxime and flagyl per UTD. Negative- MRSA swab positive.   .  Will use heat and massage and IVF hydration plus lemon drops.  ENT is not available but will call for advice.   Presume antibiotic should cover this for now.  She may ultimately need probing of her duct.   On review of CT there may be more dilatation of the duct than reflected in the report. Dr Brady reviewed with ENT yesterday who recommended conservative rx.      Wbc and temp normal. less swelling.     Nausea, vomiting, weakness. Possibly secondary to the sialadenitis since it started at the same time. CT abd wo new pathology. Appears that foul discharge in mouth makes her gag. Son says she gags chronically    Anorexic today. Gagging.     Possible urinary tract infection.  She had 34 WBCs per high-powered field and has some worsening incontinence.  We will check bladder scans, but the Rocephin started for the sialadenitis should also cover this while cultures are pending. UC still pending 09/04/19.      Slight elevation in troponin. trops 0.015--0.046--0.042--0.035   No EKG changes and no chest pains so I suspect this is not acute coronary syndrome and is strain related.      Chronic kidney disease.  She is approximately at baseline with a creatinine of 1.34.   Now creat is 1.22--1.26.     Hypertension.  She was quite hypertensive when she came in.  It has come down now to reasonable levels.   Will use labetalol p.r.n. blood pressures greater than 170/105.      Chronic obstructive pulmonary disease with chronic  hypercapnia.  hypoxia -but no distress  Ph 7.4/59 on 9/2/19. Lung volumes low and mild pul vascular congestion on cxr 9/2/19 . Suspect hypoxia is due to chf on top of chronic copd     CHF/diastolic dysfunction--with acute exacerbation  Stopping iv fluids due to hypoxia, continue pta lasix 40 bid and gave additional lasix 20 iv 9/3/19.  cxr 09/04/19 -1. No convincing interval change since 9/2/2019 at 0940 hours.  2. Hypoinflated lungs. A band-like opacity in the right lung base and  a small region of patchy opacities in the left lung base most likely  represent atelectasis. Pneumonia cannot be excluded.    Weight is at baseline and unchanged. Still needing one liter oxygen-will give one additional lasix 20 iv dose in addition to pta lasix.     Abdominal pain.  upon admit   CT-1. Colon diverticulosis without CT evidence of acute diverticulitis.  2. 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    Now resolved but anorexic      Right hip pain.  This is her primary pain complaint.  This has been ongoing and she has had ongoing steroid injections for this.  It seems like this is more trochanteric bursitis but it is unclear since [facility] and I cannot get those records.  At this point, she has been on OxyContin and will continue that, plus Tylenol plus oxycodone as needed, but try to use that sparingly.      CODE STATUS:  DNR/DNI, per POLST.      PROPHYLAXIS:  Because she is ill, will go ahead and use Lovenox.      DISPOSITION:  She needs inpatient care for treatment of these acute illnesses. Will need to see ENT, Dr Ruel Hernandez after discharge.  [facility]--planning on tcu stay after this. Gagging probably due to purulent discharge in mouth. Pt is anorexic,loose stool today-Clostridia difficile sent.     9/3/19 - discussed with son Chi. He thinks she needs a higher  level of care such as tcu upon dc. He says she is a complainer and gags easily.     Still needing one liter of oxygen--one additional iv lasix dose again today.    Probably 1-2 more days. Obtaining ent follow-up

## 2019-09-05 ENCOUNTER — APPOINTMENT (OUTPATIENT)
Dept: PHYSICAL THERAPY | Facility: CLINIC | Age: 84
DRG: 154 | End: 2019-09-05
Payer: COMMERCIAL

## 2019-09-05 LAB
ANION GAP SERPL CALCULATED.3IONS-SCNC: 5 MMOL/L (ref 3–14)
BUN SERPL-MCNC: 13 MG/DL (ref 7–30)
CALCIUM SERPL-MCNC: 9 MG/DL (ref 8.5–10.1)
CHLORIDE SERPL-SCNC: 109 MMOL/L (ref 94–109)
CO2 SERPL-SCNC: 30 MMOL/L (ref 20–32)
CREAT SERPL-MCNC: 1.09 MG/DL (ref 0.52–1.04)
ERYTHROCYTE [DISTWIDTH] IN BLOOD BY AUTOMATED COUNT: 13.5 % (ref 10–15)
GFR SERPL CREATININE-BSD FRML MDRD: 46 ML/MIN/{1.73_M2}
GLUCOSE SERPL-MCNC: 101 MG/DL (ref 70–99)
HCT VFR BLD AUTO: 34.1 % (ref 35–47)
HGB BLD-MCNC: 10.7 G/DL (ref 11.7–15.7)
MCH RBC QN AUTO: 30 PG (ref 26.5–33)
MCHC RBC AUTO-ENTMCNC: 31.4 G/DL (ref 31.5–36.5)
MCV RBC AUTO: 96 FL (ref 78–100)
PLATELET # BLD AUTO: 197 10E9/L (ref 150–450)
POTASSIUM SERPL-SCNC: 3.1 MMOL/L (ref 3.4–5.3)
POTASSIUM SERPL-SCNC: 3.6 MMOL/L (ref 3.4–5.3)
RBC # BLD AUTO: 3.57 10E12/L (ref 3.8–5.2)
SODIUM SERPL-SCNC: 144 MMOL/L (ref 133–144)
WBC # BLD AUTO: 5.3 10E9/L (ref 4–11)

## 2019-09-05 PROCEDURE — 84132 ASSAY OF SERUM POTASSIUM: CPT | Performed by: FAMILY MEDICINE

## 2019-09-05 PROCEDURE — 85049 AUTOMATED PLATELET COUNT: CPT | Performed by: FAMILY MEDICINE

## 2019-09-05 PROCEDURE — 85027 COMPLETE CBC AUTOMATED: CPT | Performed by: FAMILY MEDICINE

## 2019-09-05 PROCEDURE — 25000132 ZZH RX MED GY IP 250 OP 250 PS 637: Performed by: PHYSICIAN ASSISTANT

## 2019-09-05 PROCEDURE — 25000128 H RX IP 250 OP 636: Performed by: FAMILY MEDICINE

## 2019-09-05 PROCEDURE — 25000132 ZZH RX MED GY IP 250 OP 250 PS 637: Performed by: FAMILY MEDICINE

## 2019-09-05 PROCEDURE — 99207 ZZC CDG-CODE CATEGORY CHANGED: CPT | Performed by: FAMILY MEDICINE

## 2019-09-05 PROCEDURE — 36415 COLL VENOUS BLD VENIPUNCTURE: CPT | Performed by: FAMILY MEDICINE

## 2019-09-05 PROCEDURE — 99232 SBSQ HOSP IP/OBS MODERATE 35: CPT | Performed by: FAMILY MEDICINE

## 2019-09-05 PROCEDURE — 25000132 ZZH RX MED GY IP 250 OP 250 PS 637: Performed by: INTERNAL MEDICINE

## 2019-09-05 PROCEDURE — 12000000 ZZH R&B MED SURG/OB

## 2019-09-05 PROCEDURE — 80048 BASIC METABOLIC PNL TOTAL CA: CPT | Performed by: FAMILY MEDICINE

## 2019-09-05 PROCEDURE — 97530 THERAPEUTIC ACTIVITIES: CPT | Mod: GP | Performed by: PHYSICAL THERAPIST

## 2019-09-05 PROCEDURE — 25800030 ZZH RX IP 258 OP 636: Performed by: FAMILY MEDICINE

## 2019-09-05 RX ORDER — OXYCODONE HCL 10 MG/1
10 TABLET, FILM COATED, EXTENDED RELEASE ORAL EVERY 12 HOURS
Qty: 14 TABLET | Refills: 0 | Status: SHIPPED | OUTPATIENT
Start: 2019-09-05 | End: 2019-09-10

## 2019-09-05 RX ORDER — CEFUROXIME AXETIL 250 MG/1
500 TABLET ORAL EVERY 12 HOURS SCHEDULED
Status: DISCONTINUED | OUTPATIENT
Start: 2019-09-05 | End: 2019-09-06 | Stop reason: HOSPADM

## 2019-09-05 RX ORDER — CEFPROZIL 250 MG/1
250 TABLET, FILM COATED ORAL EVERY 12 HOURS SCHEDULED
Status: DISCONTINUED | OUTPATIENT
Start: 2019-09-05 | End: 2019-09-05 | Stop reason: CLARIF

## 2019-09-05 RX ORDER — OXYCODONE HYDROCHLORIDE 5 MG/1
5 TABLET ORAL DAILY PRN
Qty: 8 TABLET | Refills: 0 | Status: SHIPPED | OUTPATIENT
Start: 2019-09-05 | End: 2019-09-10

## 2019-09-05 RX ORDER — METRONIDAZOLE 500 MG/1
500 TABLET ORAL 2 TIMES DAILY
Refills: 0 | COMMUNITY
Start: 2019-09-05 | End: 2019-09-06

## 2019-09-05 RX ORDER — METRONIDAZOLE 250 MG/1
250 TABLET ORAL 2 TIMES DAILY
Status: DISCONTINUED | OUTPATIENT
Start: 2019-09-05 | End: 2019-09-06 | Stop reason: HOSPADM

## 2019-09-05 RX ORDER — CEFUROXIME AXETIL 500 MG/1
500 TABLET ORAL EVERY 12 HOURS
Qty: 20 TABLET | Refills: 0 | Status: SHIPPED | OUTPATIENT
Start: 2019-09-05 | End: 2019-09-06

## 2019-09-05 RX ORDER — TIZANIDINE 2 MG/1
TABLET ORAL
Qty: 12 TABLET | Refills: 0 | Status: ON HOLD | OUTPATIENT
Start: 2019-09-05 | End: 2019-01-01

## 2019-09-05 RX ORDER — CEFPROZIL 500 MG/1
500 TABLET, FILM COATED ORAL 2 TIMES DAILY
Qty: 20 TABLET | Refills: 0 | COMMUNITY
Start: 2019-09-05 | End: 2019-09-05

## 2019-09-05 RX ADMIN — LISINOPRIL 20 MG: 20 TABLET ORAL at 20:39

## 2019-09-05 RX ADMIN — METRONIDAZOLE 500 MG: 500 INJECTION, SOLUTION INTRAVENOUS at 06:31

## 2019-09-05 RX ADMIN — Medication 200 MG: at 08:43

## 2019-09-05 RX ADMIN — DONEPEZIL HYDROCHLORIDE 5 MG: 5 TABLET ORAL at 08:36

## 2019-09-05 RX ADMIN — POTASSIUM CHLORIDE 40 MEQ: 20 TABLET, EXTENDED RELEASE ORAL at 06:28

## 2019-09-05 RX ADMIN — ASPIRIN 81 MG 81 MG: 81 TABLET ORAL at 08:37

## 2019-09-05 RX ADMIN — ACETAMINOPHEN 1000 MG: 500 TABLET, FILM COATED ORAL at 21:56

## 2019-09-05 RX ADMIN — ONDANSETRON 4 MG: 2 INJECTION INTRAMUSCULAR; INTRAVENOUS at 08:30

## 2019-09-05 RX ADMIN — GABAPENTIN 200 MG: 100 CAPSULE ORAL at 20:39

## 2019-09-05 RX ADMIN — DULOXETINE HYDROCHLORIDE 60 MG: 30 CAPSULE, DELAYED RELEASE ORAL at 08:39

## 2019-09-05 RX ADMIN — METRONIDAZOLE 500 MG: 500 INJECTION, SOLUTION INTRAVENOUS at 01:18

## 2019-09-05 RX ADMIN — OXYCODONE HYDROCHLORIDE 10 MG: 10 TABLET, FILM COATED, EXTENDED RELEASE ORAL at 08:35

## 2019-09-05 RX ADMIN — CEFUROXIME SODIUM 1 G: 1.5 INJECTION, POWDER, FOR SOLUTION INTRAVENOUS at 05:45

## 2019-09-05 RX ADMIN — FUROSEMIDE 40 MG: 40 TABLET ORAL at 08:36

## 2019-09-05 RX ADMIN — DULOXETINE HYDROCHLORIDE 30 MG: 30 CAPSULE, DELAYED RELEASE ORAL at 21:56

## 2019-09-05 RX ADMIN — GABAPENTIN 200 MG: 100 CAPSULE ORAL at 08:38

## 2019-09-05 RX ADMIN — LISINOPRIL 20 MG: 20 TABLET ORAL at 08:38

## 2019-09-05 RX ADMIN — FELODIPINE 10 MG: 10 TABLET, EXTENDED RELEASE ORAL at 08:43

## 2019-09-05 RX ADMIN — OXYCODONE HYDROCHLORIDE 10 MG: 10 TABLET, FILM COATED, EXTENDED RELEASE ORAL at 20:43

## 2019-09-05 RX ADMIN — FUROSEMIDE 40 MG: 40 TABLET ORAL at 20:39

## 2019-09-05 RX ADMIN — CEFUROXIME AXETIL 500 MG: 250 TABLET ORAL at 20:39

## 2019-09-05 RX ADMIN — ACETAMINOPHEN 1000 MG: 500 TABLET, FILM COATED ORAL at 08:35

## 2019-09-05 RX ADMIN — ATORVASTATIN CALCIUM 40 MG: 20 TABLET, FILM COATED ORAL at 08:37

## 2019-09-05 RX ADMIN — METRONIDAZOLE 250 MG: 250 TABLET ORAL at 20:43

## 2019-09-05 RX ADMIN — POTASSIUM CHLORIDE 20 MEQ: 20 TABLET, EXTENDED RELEASE ORAL at 08:40

## 2019-09-05 RX ADMIN — MICONAZOLE NITRATE: 20 POWDER TOPICAL at 08:43

## 2019-09-05 RX ADMIN — ENOXAPARIN SODIUM 40 MG: 40 INJECTION SUBCUTANEOUS at 08:39

## 2019-09-05 RX ADMIN — MICONAZOLE NITRATE: 20 POWDER TOPICAL at 20:38

## 2019-09-05 RX ADMIN — FLUTICASONE FUROATE AND VILANTEROL TRIFENATATE 1 PUFF: 100; 25 POWDER RESPIRATORY (INHALATION) at 08:44

## 2019-09-05 RX ADMIN — OMEPRAZOLE 40 MG: 20 CAPSULE, DELAYED RELEASE ORAL at 08:38

## 2019-09-05 ASSESSMENT — ACTIVITIES OF DAILY LIVING (ADL)
ADLS_ACUITY_SCORE: 22
ADLS_ACUITY_SCORE: 23
ADLS_ACUITY_SCORE: 23
ADLS_ACUITY_SCORE: 22

## 2019-09-05 ASSESSMENT — MIFFLIN-ST. JEOR: SCORE: 1291.13

## 2019-09-05 NOTE — PROGRESS NOTES
Name: Jazmin Clifton    MRN#: [id number]    Reason for Hospitalization: Generalized muscle weakness [M62.81]  Sialolithiasis of submandibular gland [K11.5]  Acute cystitis without hematuria [N30.00]  Non-intractable vomiting with nausea, unspecified vomiting type [R11.2]    Anticipated Discharge Date: 9/6/19    Patient / Family response to discharge plan: Pt is agreeable to discharge to TCU. [facility] Although, bed is not available. Provided pt TCU resource list of other possible options. Pt states would have [facility] as 2nd choice and [facility]CU as third choice. Writer called TCUs and sent referrals. Await responses.    Follow-Up Appt:   Future Appointments   Date Time Provider Department Center   9/13/2019  9:30 AM Ole Mendoza MD [facility]         Discharge Disposition: Pending TCU referrals. Pt to discharge when bed becomes available.    Hali Yi, .[phone]    Addendum: [facility] may have bed available tomorrow. Care Transitions to call tomorrow morning.

## 2019-09-05 NOTE — PLAN OF CARE
Discharge Planner PT   Patient plan for discharge: TCU     Current status: Pt c/o dizziness (VSS). Amb short tin room distances w/ RW and SBA.;  Min. Assistance sit> stand.  Mobility variable, c/o increased LBP / R hip pain w/ second  Short bout of amb.     Barriers to return to prior living situation: level of assistance, medcical stabilit    Recommendations for discharge: TCU     Rationale for recommendations: TCU stay as pt below baseline w/ mobility- typically can amb household distances w/ RW  [facility]       Entered by: Honey Espinoza 09/05/2019 2:16 PM

## 2019-09-05 NOTE — PLAN OF CARE
Pt says that pain is jaw is resolved today, minimal swelling.  She reports occasional bad taste in mouth, makes her gag.  Diet advanced to regular for lunch, tolerated well, had 75% of meal.  One soft BM this morning, no diarrhea.  IV removed due to leaking.  Antibiotics changed to oral.  Pt to discharge to TCU when bed available, she's aware of plan.

## 2019-09-05 NOTE — PROGRESS NOTES
[facility] Hospitalist Service      Subjective:  I am better  Less left neck pain and swelling  Still not eating well    Review of Systems:  CONSTITUTIONAL: NEGATIVE for fever, chills, change in weight  ENT/MOUTH: above  RESP: NEGATIVE for significant cough or SOB  CV: NEGATIVE for chest pain, palpitations or peripheral edema    Physical Exam:  Vitals Were Reviewed    Patient Vitals for the past 16 hrs:   BP Temp Temp src Pulse Heart Rate Resp SpO2 Weight   09/05/19 0828 -- -- -- -- -- -- 93 % --   09/05/19 0729 (!) 180/77 97.6  F (36.4  C) Oral 75 -- 18 95 % --   09/05/19 0547 -- -- -- -- -- -- -- 88.2 kg (194 lb 7.1 oz)   09/05/19 0345 128/44 98.2  F (36.8  C) Oral 70 -- 18 92 % --   09/04/19 2247 139/45 98.5  F (36.9  C) Oral 65 65 18 92 % --   09/04/19 1924 129/42 98.2  F (36.8  C) Oral 72 72 18 93 % --         Intake/Output Summary (Last 24 hours) at 9/5/2019 1047  Last data filed at 9/4/2019 2111  Gross per 24 hour   Intake 617.7 ml   Output --   Net 617.7 ml       GENERAL APPEARANCE: healthy, alert and no distress  HENT: left submandibular gland is smaller and less tender  RESP: a few bibasilar crackles   CV: regular rate and rhythm, normal S1 S2, no S3 or S4 and no murmur, click or rub   ABDOMEN: soft, nontender, no HSM or masses and bowel sounds normal  MS: no clubbing, cyanosis; no edema  SKIN: clear without significant rashes or lesions    Lab:  Recent Labs   Lab Test 09/05/19  0541 09/04/19  0552    145*   POTASSIUM 3.1* 3.4   CHLORIDE 109 111*   CO2 30 30   ANIONGAP 5 4   * 99   BUN 13 17   CR 1.09* 1.26*   BLAIR 9.0 8.7     CBC RESULTS:   Recent Labs   Lab Test 09/05/19  0541 09/04/19  0552   WBC 5.3 4.2   RBC 3.57* 3.40*   HGB 10.7* 10.3*   HCT 34.1* 33.0*    174       Results for orders placed or performed during the hospital encounter of 09/01/19 (from the past 24 hour(s))   CBC with platelets   Result Value Ref Range    WBC 5.3 4.0 - 11.0 10e9/L    RBC Count 3.57 (L) 3.8 - 5.2  10e12/L    Hemoglobin 10.7 (L) 11.7 - 15.7 g/dL    Hematocrit 34.1 (L) 35.0 - 47.0 %    MCV 96 78 - 100 fl    MCH 30.0 26.5 - 33.0 pg    MCHC 31.4 (L) 31.5 - 36.5 g/dL    RDW 13.5 10.0 - 15.0 %    Platelet Count 197 150 - 450 10e9/L   Basic metabolic panel   Result Value Ref Range    Sodium 144 133 - 144 mmol/L    Potassium 3.1 (L) 3.4 - 5.3 mmol/L    Chloride 109 94 - 109 mmol/L    Carbon Dioxide 30 20 - 32 mmol/L    Anion Gap 5 3 - 14 mmol/L    Glucose 101 (H) 70 - 99 mg/dL    Urea Nitrogen 13 7 - 30 mg/dL    Creatinine 1.09 (H) 0.52 - 1.04 mg/dL    GFR Estimate 46 (L) >60 mL/min/[1.73_m2]    GFR Estimate If Black 53 (L) >60 mL/min/[1.73_m2]    Calcium 9.0 8.5 - 10.1 mg/dL       Assessment and Plan:     Acute left submandibular sialadenitis.  She was started on Rocephin but changed to  cefuroxime and flagyl per UTD. Negative- MRSA swab positive.   .  Will use heat and massage and IVF hydration plus lemon drops.  ENT is not available but will call for advice.   Presume antibiotic should cover this for now.  She may ultimately need probing of her duct.   On review of CT there may be more dilatation of the duct than reflected in the report. Dr Brady reviewed with ENT yesterday who recommended conservative rx.      Wbc and temp normal. less swelling.     Nausea, vomiting, weakness. Possibly secondary to the sialadenitis since it started at the same time. CT abd wo new pathology. Appears that foul discharge in mouth makes her gag. Son says she gags chronically     Some what anorexicc today. Gagging.     Possible urinary tract infection-ruled out.  She had 34 WBCs per high-powered field and has some worsening incontinence.  UC with mixed weston.     Slight elevation in troponin. trops 0.015--0.046--0.042--0.035   No EKG changes and no chest pains so I suspect this is not acute coronary syndrome and is strain related.      Chronic kidney disease.  She is approximately at baseline with a creatinine of 1.34.   Now creat is  1.22--1.26--1.09.     Hypertension.  She was quite hypertensive when she came in.  It has come down now to reasonable levels.   Will use labetalol p.r.n. blood pressures greater than 170/105.      Chronic obstructive pulmonary disease with chronic hypercapnia.  hypoxia -but no distress  Ph 7.4/59 on 9/2/19. Lung volumes low and mild pul vascular congestion on cxr 9/2/19 . Suspect hypoxia is due to chf on top of chronic copd  09/05/19 still on one liter periodically and on RA.     CHF/diastolic dysfunction--with acute exacerbation  Stopping iv fluids due to hypoxia, continue pta lasix 40 bid and gave additional lasix 20 iv 9/3/19.  cxr 09/04/19 -1. No convincing interval change since 9/2/2019 at 0940 hours.  2. Hypoinflated lungs. A band-like opacity in the right lung base and  a small region of patchy opacities in the left lung base most likely  represent atelectasis. Pneumonia cannot be excluded.     Weight is at baseline and unchanged. Given lasix 20 iv on the last two days.     Abdominal pain.  upon admit   CT-1. Colon diverticulosis without CT evidence of acute diverticulitis.  2. 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     Now resolved but anorexic      Right hip pain.  This is her primary pain complaint.  This has been ongoing and she has had ongoing steroid injections for this.  It seems like this is more trochanteric bursitis but it is unclear since [facility] and I cannot get those records.  At this point, she has been on OxyContin and will continue that, plus Tylenol plus oxycodone as needed, but try to use that sparingly.      CODE STATUS:  DNR/DNI, per POLST.      PROPHYLAXIS:  Because she is ill, will go ahead and use Lovenox.      DISPOSITION:   Will need to see ENT-I have already requested an out pt apmnt..  [facility]--planning on tcu .  Gagging probably due to purulent discharge in mouth-but gags chronically. I think pt can discharge-anorexia will need to be observed. Clostridia difficile is neg.     9/3/19 - discussed with son Chi. He thinks she needs a higher level of care such as tcu upon dc. He says she is a complainer and gags easily.     1:03 PM no bed in tcu, change to oral atb.

## 2019-09-05 NOTE — PLAN OF CARE
OT: Attempted x2. Pt declines OOB activity and getting AM meds. Then in afternoon working with PT. Will attempt tomorrow as appropriate.

## 2019-09-05 NOTE — PLAN OF CARE
Pt a&ox4.oxygen levels would drop with sleep, increased o2 to 2L. Other vs stables. Chronic hip pain, received scheduled OxyContin with relief. Incontinent of b&b at times, ao2 to change and reposition. Iv antibiotics infused as ordered. Took pills in applesauce. Alarms on.

## 2019-09-06 ENCOUNTER — PATIENT OUTREACH (OUTPATIENT)
Dept: GERIATRIC MEDICINE | Facility: CLINIC | Age: 84
End: 2019-09-06

## 2019-09-06 VITALS
BODY MASS INDEX: 33.59 KG/M2 | DIASTOLIC BLOOD PRESSURE: 58 MMHG | TEMPERATURE: 97.5 F | SYSTOLIC BLOOD PRESSURE: 145 MMHG | OXYGEN SATURATION: 90 % | WEIGHT: 189.6 LBS | RESPIRATION RATE: 16 BRPM | HEIGHT: 63 IN | HEART RATE: 82 BPM

## 2019-09-06 LAB
ANION GAP SERPL CALCULATED.3IONS-SCNC: 5 MMOL/L (ref 3–14)
BUN SERPL-MCNC: 13 MG/DL (ref 7–30)
CALCIUM SERPL-MCNC: 9.3 MG/DL (ref 8.5–10.1)
CHLORIDE SERPL-SCNC: 109 MMOL/L (ref 94–109)
CO2 SERPL-SCNC: 29 MMOL/L (ref 20–32)
CREAT SERPL-MCNC: 1.15 MG/DL (ref 0.52–1.04)
ERYTHROCYTE [DISTWIDTH] IN BLOOD BY AUTOMATED COUNT: 13.8 % (ref 10–15)
GFR SERPL CREATININE-BSD FRML MDRD: 43 ML/MIN/{1.73_M2}
GLUCOSE SERPL-MCNC: 114 MG/DL (ref 70–99)
HCT VFR BLD AUTO: 34.6 % (ref 35–47)
HGB BLD-MCNC: 10.8 G/DL (ref 11.7–15.7)
MCH RBC QN AUTO: 30 PG (ref 26.5–33)
MCHC RBC AUTO-ENTMCNC: 31.2 G/DL (ref 31.5–36.5)
MCV RBC AUTO: 96 FL (ref 78–100)
PLATELET # BLD AUTO: 202 10E9/L (ref 150–450)
POTASSIUM SERPL-SCNC: 3.6 MMOL/L (ref 3.4–5.3)
RBC # BLD AUTO: 3.6 10E12/L (ref 3.8–5.2)
SODIUM SERPL-SCNC: 143 MMOL/L (ref 133–144)
WBC # BLD AUTO: 6.2 10E9/L (ref 4–11)

## 2019-09-06 PROCEDURE — 25000132 ZZH RX MED GY IP 250 OP 250 PS 637: Performed by: FAMILY MEDICINE

## 2019-09-06 PROCEDURE — 25000132 ZZH RX MED GY IP 250 OP 250 PS 637: Performed by: INTERNAL MEDICINE

## 2019-09-06 PROCEDURE — 80048 BASIC METABOLIC PNL TOTAL CA: CPT | Performed by: FAMILY MEDICINE

## 2019-09-06 PROCEDURE — 25000128 H RX IP 250 OP 636: Performed by: FAMILY MEDICINE

## 2019-09-06 PROCEDURE — 99239 HOSP IP/OBS DSCHRG MGMT >30: CPT | Performed by: FAMILY MEDICINE

## 2019-09-06 PROCEDURE — 25000132 ZZH RX MED GY IP 250 OP 250 PS 637: Performed by: PHYSICIAN ASSISTANT

## 2019-09-06 PROCEDURE — 85027 COMPLETE CBC AUTOMATED: CPT | Performed by: FAMILY MEDICINE

## 2019-09-06 PROCEDURE — 36415 COLL VENOUS BLD VENIPUNCTURE: CPT | Performed by: FAMILY MEDICINE

## 2019-09-06 RX ORDER — METRONIDAZOLE 250 MG/1
250 TABLET ORAL 2 TIMES DAILY
Qty: 20 TABLET | COMMUNITY
Start: 2019-09-06 | End: 2019-09-24

## 2019-09-06 RX ORDER — CEFUROXIME AXETIL 500 MG/1
500 TABLET ORAL EVERY 12 HOURS
Qty: 20 TABLET | Refills: 0 | COMMUNITY
Start: 2019-09-06 | End: 2019-09-13

## 2019-09-06 RX ADMIN — CEFUROXIME AXETIL 500 MG: 250 TABLET ORAL at 08:27

## 2019-09-06 RX ADMIN — GABAPENTIN 200 MG: 100 CAPSULE ORAL at 08:26

## 2019-09-06 RX ADMIN — DULOXETINE HYDROCHLORIDE 60 MG: 30 CAPSULE, DELAYED RELEASE ORAL at 08:26

## 2019-09-06 RX ADMIN — FELODIPINE 10 MG: 10 TABLET, EXTENDED RELEASE ORAL at 08:28

## 2019-09-06 RX ADMIN — ENOXAPARIN SODIUM 40 MG: 40 INJECTION SUBCUTANEOUS at 08:27

## 2019-09-06 RX ADMIN — ACETAMINOPHEN 1000 MG: 500 TABLET, FILM COATED ORAL at 13:20

## 2019-09-06 RX ADMIN — OXYCODONE HYDROCHLORIDE 10 MG: 10 TABLET, FILM COATED, EXTENDED RELEASE ORAL at 08:27

## 2019-09-06 RX ADMIN — ASPIRIN 81 MG 81 MG: 81 TABLET ORAL at 08:26

## 2019-09-06 RX ADMIN — DONEPEZIL HYDROCHLORIDE 5 MG: 5 TABLET ORAL at 08:27

## 2019-09-06 RX ADMIN — ATORVASTATIN CALCIUM 40 MG: 20 TABLET, FILM COATED ORAL at 08:26

## 2019-09-06 RX ADMIN — OMEPRAZOLE 40 MG: 20 CAPSULE, DELAYED RELEASE ORAL at 08:26

## 2019-09-06 RX ADMIN — MICONAZOLE NITRATE: 20 POWDER TOPICAL at 08:28

## 2019-09-06 RX ADMIN — ACETAMINOPHEN 1000 MG: 500 TABLET, FILM COATED ORAL at 08:27

## 2019-09-06 RX ADMIN — FLUTICASONE FUROATE AND VILANTEROL TRIFENATATE 1 PUFF: 100; 25 POWDER RESPIRATORY (INHALATION) at 08:28

## 2019-09-06 RX ADMIN — LISINOPRIL 20 MG: 20 TABLET ORAL at 08:26

## 2019-09-06 RX ADMIN — FUROSEMIDE 40 MG: 40 TABLET ORAL at 08:27

## 2019-09-06 RX ADMIN — OXYCODONE HYDROCHLORIDE 5 MG: 5 TABLET ORAL at 06:49

## 2019-09-06 RX ADMIN — Medication 200 MG: at 08:27

## 2019-09-06 RX ADMIN — METRONIDAZOLE 250 MG: 250 TABLET ORAL at 08:28

## 2019-09-06 ASSESSMENT — ACTIVITIES OF DAILY LIVING (ADL)
ADLS_ACUITY_SCORE: 23

## 2019-09-06 ASSESSMENT — MIFFLIN-ST. JEOR: SCORE: 1269.13

## 2019-09-06 NOTE — PROGRESS NOTES
Care Transitions Discharge:    Name: Jazmin Clifton    MRN: [id number]    Reason for Hospitalization: Generalized muscle weakness [M62.81]  Sialolithiasis of submandibular gland [K11.5]  Acute cystitis without hematuria [N30.00]  Non-intractable vomiting with nausea, unspecified vomiting type [R11.2]    Cognitive/Behavioral Status: awake, alert and oriented    Follow-up Appointments:   Future Appointments   Date Time Provider Department Center   9/13/2019  9:30 AM [facility]       Discharge Date:  9/6/2019    Patient/Care Partner in agreement and understands the discharge plan:  Yes    [facility] (Main: [phone] Admissions: [phone] Fax: [phone]).  Transportation provided by family.    Discharge Planner   Discharge Plans in progress: TCU  Barriers to discharge plan: None  Follow up plan: Referral placed for Clinic Care Coordination. Follow up with PCP.       Entered by: Melissa Newby 09/06/2019 2:17 PM         Melissa Newby RN Care Coordinator  [phone]

## 2019-09-06 NOTE — PROGRESS NOTES
PAS-RR    D: Per DHS regulation, SW completed and submitted PAS-RR to [place] [employer] Direct Connect via the Senior LinkAge Line.  PAS-RR confirmation # is : [id number]    P: Further questions may be directed to [facility] LinkAge Line at #[phone], option #4 for PAS-RR staff.    LIV Freeman  [facility] [phone]/ [facility] [phone]

## 2019-09-06 NOTE — PLAN OF CARE
Physical Therapy Discharge Summary    Reason for therapy discharge:    Discharged to transitional care facility.    Progress towards therapy goal(s). See goals on Care Plan in [facility] electronic health record for goal details.  Goals partially met.  Barriers to achieving goals:   discharge from facility.    Therapy recommendation(s):    Continued therapy is recommended.  Rationale/Recommendations:  to increase strength/ assist pt in retuning to baseline w/ mobility.

## 2019-09-06 NOTE — PLAN OF CARE
"Pt alert/oriented. Left neck tender to touch, no redness noted. Pt states has occasional \"bad taste in mouth.\" frequent mouth cares offered. up with 1 & walker. Moves slow. Had small loose stool. Bottom red, sore. Barrier cream applied. No open areas noted. Antifungal powder applied under breasts, abdominal fold, groin. Pt complained of pain in right hip, needed assist to reposition. Stated had increased pain in right groin/thigh. 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 Area occasionally fills up with fluid & I have pain up into bladder & back. After a few days of heat, the swelling & pain go down.\" pt has firm area in right groin/thigh. Repositioned & hot pack applied. Scheduled oxycontin given. Spoke with ivon GUPTA. No new orders, stated she would pass it along for a.dez CHO.   "

## 2019-09-06 NOTE — PLAN OF CARE
"Pt stated \"much better\". Area to right groin/thigh less firm. Pt states \"this is how it goes, it fills up with fluid, then drains & pain goes away.\" repositioned for comfort. Mouth cares offered. Pt will occasionally cough up small amts white thick white phlegm. Lungs clear. Afebrile. Refused SCD's. Has had warm pack to left neck gland & states this has decreased in size & is less painful. Bed alarm on for safety.  "

## 2019-09-06 NOTE — DISCHARGE SUMMARY
:     09/01/2019   Discharge Date: 09/06/19        Jazmin Clifton is an 86-year-old female with a history of COPD, chronic hypercapnic respiratory failure, hypertension, overactive bladder, GERD, hyperlipidemia, CKD, previous CVA/TIA, chronic diastolic heart failure, obesity, chronic right hip pain - on chronic narcotics, mild cognitive impairment with possible Alzheimer's disease, depression, carotid stenosis, hyperlipidemia and spinal stenosis.  The patient presented with 3-day history of general malaise, weakness, nausea and vomiting along with swelling of her left jaw and tenderness in this area.  She had some vague complaints of abdominal pain upon admission.      Upon initial evaluation, she was found to have an acute left submandibular sialadenitis.  Ultimately, she was placed on cefuroxime and Flagyl as per up-to-date recommendations.  She had a negative MRSA swab, so vancomycin was not used.  Dr. Brady consulted with ENT.  They recommended conservative management with heat, massage and IV hydration.  The patient improved in terms of her left submandibular gland.  The gland became less tender and smaller in size.  She had some purulent type drainage out of the submandibular duct in her mouth.      The patient did present with nausea, vomiting and weakness.  She did have an abdominal CT, which did not show overt pathology.  I talked to her son.  He said that she gags chronically.  She gagged here in the hospital, reporting a bad taste in her mouth, which I assume was drainage from the submandibular duct.      She did remain somewhat anorexic and had to be encouraged to eat.      There was some concern for a urinary tract infection due to pyuria in her initial UA; however, the culture grew out mixed weston, so there was no UTI.      She did have a slight elevation in her troponin from 0.015 to a maximum of 0.046.  There were no EKG changes or chest pains.  This was thought to be strain related.      In terms of  her chronic kidney disease, she presented with a creatinine of 1.34, which is essentially at her baseline.  Her creatinine was 1.09 at the time of discharge.      The patient had some mild hypoxia in the hospital.  This was thought to be related to her chronic COPD and also due to some acute exacerbation of diastolic CHF, she had been given fluids initially.  I did give her 2 additional doses of IV Lasix on top of her prior to admission Lasix dose.  Her hypoxia did improve.  I noted that on the last day of hospitalization, she was off oxygen when I saw her.  I think she does tend to drop some at night.  I did order oxygen to be used p.r.n. to keep her sats greater than 90 in the transitional care facility.      The patient has ongoing right hip pain.  She has had steroid injections for this.  We were not able to obtain records because these [facility].  She has been on chronic narcotics for this and I will continue this at this time.      I have asked that she follow up with ENT as per Dr. Brady's conversation with ENT, but she does seem to be responding to conservative treatment.     The pt was set up for discharge 9/5/19 but the discharge was held up due to lack of TCU be availability.     ASSESSMENT:   1.  Acute left submandibular sialadenitis.   2.  Nausea, vomiting and weakness -- suspect related to above, patient does have chronic history of gagging.   3.  No sign of urinary tract infection.   4.  Mild troponin elevation -- strain related.   5.  Chronic kidney disease, stage III.   6.  Hypertension.   7.  Mild hypoxia during his hospitalization due to chronic COPD and mild acute on chronic diastolic congestive heart failure.   8.  History of diastolic CHF.   9.  Chronic right hip pain, on chronic narcotics.      PLAN:  The patient is going to discharge on Cefzil and Flagyl and take this for a week.  I have asked that she follow up with ENT and I have asked that the secretaries make an  appointment for her.  It looks like it would be simpler for her to see ENT here, and I think an appointment has been made here.  She is going to return to her other previous medications.  I have asked that she be provided Boost, Ensure or a similar nutritional supplement with meals.  Her p.o. intake will need to be followed into the future.  I have requested primary care followup within a week and ENT followup.      Greater than 30 minutes spent on this.     Current Discharge Medication List      START taking these medications    Details   cefuroxime (CEFTIN) 500 MG tablet Take 1 tablet (500 mg) by mouth every 12 hours  Qty: 20 tablet, Refills: 0    Associated Diagnoses: Sialadenitis         CONTINUE these medications which have CHANGED    Details   metroNIDAZOLE (FLAGYL) 250 MG tablet Take 1 tablet (250 mg) by mouth 2 times daily  Qty: 20 tablet      oxyCODONE (OXYCONTIN) 10 MG 12 hr tablet Take 1 tablet (10 mg) by mouth every 12 hours maximum 2 tablet(s) per day  Qty: 14 tablet, Refills: 0    Associated Diagnoses: Chronic pain syndrome; DDD (degenerative disc disease), lumbar      oxyCODONE (ROXICODONE) 5 MG tablet Take 1 tablet (5 mg) by mouth daily as needed for severe pain  Qty: 8 tablet, Refills: 0    Associated Diagnoses: Chronic pain syndrome      tiZANidine (ZANAFLEX) 2 MG tablet TAKE 1 TABLET BY MOUTH THREE TIMES DAILY AS NEEDED  Qty: 12 tablet, Refills: 0    Associated Diagnoses: Chronic pain syndrome         CONTINUE these medications which have NOT CHANGED    Details   acetaminophen (TYLENOL) 500 MG tablet Take 1,000 mg by mouth 3 times daily      ASPIRIN LOW DOSE 81 MG chewable tablet CHEW AND SWALLOW ONE TABLET BY MOUTH ONCE DAILY  Qty: 30 tablet, Refills: 11    Associated Diagnoses: Congestive heart failure, unspecified HF chronicity, unspecified heart failure type (H)      atorvastatin (LIPITOR) 40 MG tablet TAKE 1 TABLET BY MOUTH ONCE DAILY  Qty: 30 tablet, Refills: 98    Associated Diagnoses:  Congestive heart failure, unspecified HF chronicity, unspecified heart failure type (H)      BREO ELLIPTA 100-25 MCG/INH inhaler INHALE 1 PUFF BY MOUTH ONCE DAILY  Qty: 1 Inhaler, Refills: 11    Associated Diagnoses: Chronic obstructive pulmonary disease, unspecified COPD type (H)      DONEPEZIL HCL PO Take 5 mg by mouth At Bedtime       DULoxetine (CYMBALTA) 30 MG capsule Take 2 capsules (60 mg) by mouth daily AND 1 capsule (30 mg) At Bedtime.  Qty: 90 capsule, Refills: 11    Associated Diagnoses: Chronic pain syndrome; Depression with anxiety      Emollient (MOISTURIZING LOTION EX) Externally apply topically 2 times daily To bilateral lower extremeties      fluticasone (FLONASE) 50 MCG/ACT nasal spray INHALE 1 SPRAY IN EACH NOSTRIL TWICE DAILY  Qty: 16 g, Refills: 97    Associated Diagnoses: Allergic rhinitis due to pollen, unspecified seasonality      furosemide (LASIX) 40 MG tablet Take 1 tablet (40 mg) by mouth 2 times daily  Qty: 60 tablet, Refills: 11    Associated Diagnoses: Congestive heart failure, unspecified HF chronicity, unspecified heart failure type (H)      gabapentin (NEURONTIN) 100 MG capsule Take 2 capsules (200 mg) by mouth 2 times daily  Qty: 120 capsule, Refills: 11    Associated Diagnoses: DDD (degenerative disc disease), lumbar; Spinal stenosis of lumbar region, unspecified whether neurogenic claudication present      isradipine (DYNACIRC) 5 MG capsule Take 5 mg by mouth 2 times daily      lisinopril (PRINIVIL/ZESTRIL) 20 MG tablet Take 1 tablet (20 mg) by mouth 2 times daily  Qty: 62 tablet, Refills: 98    Associated Diagnoses: Congestive heart failure, unspecified HF chronicity, unspecified heart failure type (H)      MAGNESIUM OXIDE PO Take 250 mg by mouth every evening       OMEPRAZOLE PO Take 40 mg by mouth every morning      vitamin D3 (CHOLECALCIFEROL) 1000 units (25 mcg) tablet Take 1 tablet (1,000 Units) by mouth daily  Qty: 60 tablet, Refills: 11    Associated Diagnoses:  Age-related osteoporosis without current pathological fracture      Blood Glucose Monitoring Suppl CORY 2 times daily Call nurse for further directions if blood glucose is less than 80 or greater than 250      calcium carbonate (TUMS) 500 MG chewable tablet Take 1 chew tab by mouth every hour as needed for heartburn      Hypromellose (NATURAL BALANCE TEARS OP) Place 1 drop into both eyes every 6 hours as needed (to both eyes)       levalbuterol (XOPENEX) 1.25 MG/3ML neb solution Take 1 ampule by nebulization every 4 hours as needed for shortness of breath / dyspnea or wheezing And every 4 hours PRN      Lidocaine (LIDOCARE) 4 % Patch Place 1 patch onto the skin daily as needed To desired area (shoulder or hip). On for 12hrs, off for 12hrs      Menthol, Topical Analgesic, (BIOFREEZE) 4 % GEL Externally apply topically 4 times daily as needed To left shoulder and right hip      naloxone (NARCAN) 4 MG/0.1ML nasal spray Spray 1 spray (4 mg) into one nostril alternating nostrils once as needed for opioid reversal q 2-3 min until responsive/EMS arrive  Qty: 0.2 mL, Refills: 0    Associated Diagnoses: Chronic pain syndrome; Chronic, continuous use of opioids      nystatin (MYCOSTATIN) 696558 UNIT/GM external powder APPLY TOPICALLY TO ABDOMEN FOLDS, GROIN, AND BREASTS TWICE DAILY AS NEEDED  Qty: 60 g, Refills: 97    Associated Diagnoses: Rash      phenazopyridine (PYRIDIUM) 100 MG tablet Take 1 tablet (100 mg) by mouth 3 times daily as needed for urinary tract discomfort  Qty: 9 tablet, Refills: 1    Associated Diagnoses: Dysuria      senna-docusate (SENOKOT-S/PERICOLACE) 8.6-50 MG tablet Take 1 tablet by mouth 2 times daily as needed for constipation          STOP taking these medications       cefPROZIL (CEFZIL) 500 MG tablet Comments:   Reason for Stopping:             Unresulted Labs Ordered in the Past 30 Days of this Admission     Date and Time Order Name Status Description    9/1/2019 1828 Blood culture Preliminary      2019 1828 Blood culture Preliminary                  VINNY SIDDIQUI MD            Name:     VICKIE REID   MRN:      [id number]        Account:        [id number]   :      1932           Admit Date:     2019                                  Discharge Date:  19      Document: [id number]       cc: Norah Foley MD

## 2019-09-06 NOTE — PROGRESS NOTES
TRANSITIONS OF CARE (AJAY) LOG   AJAY tasks should be completed by the CC within one (1) business day of notification of each transition. Follow up contact with member is required after return to their usual care setting.  Note:  If CC finds out about the transitions fifteen (15) days or more after the member has returned to their usual care setting, no AJAY log is needed. However, the CC should check in with the member to discuss the transition process, any changes needed to the care plan and document it in a case note.    Member Name:  Jazmin Clifton [facility] Name:  [employer] Member ID#: [id number]   Product: [facility] Care Coordinator Contact:  Mirian Weldon MA, [facility] Agency/County/Care System: [facility]   Transition Communication Actions from Care Management Contact   Transition #1   Notification Date: 9-3-19 Transition Date:   9-1-19 Transition From: Assisted Living, [facility]      Is this the member s usual care setting?               yes Transition To: [facility]    Transition Type:  Unplanned  Reason for Admission/Comments:  1.  Acute left submandibular sialadenitis.   2.  Nausea, vomiting and weakness -- suspect related to above, patient does have chronic history of gagging.   3.  No sign of urinary tract infection.   4.  Mild troponin elevation -- strain related.   5.  Chronic kidney disease, stage III.   6.  Hypertension.   7.  Mild hypoxia during his hospitalization due to chronic COPD and mild acute on chronic diastolic congestive heart failure.   8.  History of diastolic CHF.   9.  Chronic right hip pain, on chronic narcotics.      Shared CC contact info, care plan/services with receiving setting--Date completed: 9-3-19    Notified PCP of transition--Date completed:  9-1-19     via  EMR   Transition #2   Transition #3  (if applicable)   Notification Date: 9-6-19         [facility]   Transition Date: 9-6-19      Transition Type:    Planned  Notified PCP -- Date completed: 9-6-19              Shared CC contact info, care plan/services with receiving setting or, if applicable, home care agency--Date completed:  9-6-19  *Complete additional tasks below, if this transition is a return to usual care setting.      Comments: CC then notified that member had ED visit on 9-9-19 but no admission at this time      Notification Date:  9-11-19        Transition To:  Skilled Nursing Facility, [facility]   Transition Date:   9-12-19           Transition Type:    Planned  Notified PCP--Date completed: 9-12-19         Shared CC contact info, care plan/services with receiving setting or, if applicable, home care agency--Date completed: 9-12-19      *Complete additional tasks below, if this transition is a return to usual care setting.      Comments:  Member transferred to TCU attached to her AL        *Complete tasks below when the member is discharging TO their usual care setting within one (1) business day of notification.  For situations where the Care Coordinator is notified of the discharge prior to the date of discharge, the Care Coordinator must follow up with the member or designated representative to confirm that discharge actually occurred and discuss required AJAY tasks as outlined in the AJAY Instructions.  (This includes situations where it may be a  new  usual care setting for the member. (i.e., a community member who decides upon permanent nursing home placement following hospitalization and rehab).    Date completed:   Communicated with member or their designated representative about the following:  care transition process; about changes to the member s health status; plan of care updates; education about transitions and how to prevent unplanned transitions/readmissions  Four Pillars for Optimal Transition:    Check  Yes  - if the member, family member and/or SNF/facility staff manages the following:    If  No   provide explanation in the comments section.          []  Yes     []  No     Does the member have a follow-up appointment scheduled with primary care or specialist? (Mental health hospitalizations--the appt. should be w/in 7 days)   []  Yes     []  No     Can the member manage their medications or is there a system in place to manage medications (e.g. home care set-up)?         []  Yes     []  No     Can the member verbalize warning signs and symptoms to watch for and how to respond?         []  Yes     []  No     Does the member use a Personal Health Care Record?  Check  Yes  if visit summary, discharge summary, and/or healthcare summary are being used as a PHR.                                                                                                                                                                                    [] Yes      [] No      Have you updated the member s care plan?  If  No  provide explanation in comments.   Comments:  See next AJAY for update

## 2019-09-06 NOTE — PLAN OF CARE
Occupational Therapy Discharge Summary    Reason for therapy discharge:    Discharged to transitional care facility.    Progress towards therapy goal(s). See goals on Care Plan in [facility] electronic health record for goal details.  Goals partially met.  Barriers to achieving goals:   discharge from facility.    Therapy recommendation(s):    Continued therapy is recommended.  Rationale/Recommendations:  TCU to increase independence with ADLs and functional mobility.

## 2019-09-06 NOTE — PROGRESS NOTES
[facility] given with no further questions or concerns. Pt is to discharge at 1500 today. Narcotic scripts are in white packet that will be given to transportation.  Ashley Downs RN BSN

## 2019-09-06 NOTE — PROGRESS NOTES
[facility] Hospitalist Service      Subjective:  I am better  Still awaiting tcu  Left neck is better    Review of Systems:  CONSTITUTIONAL: NEGATIVE for fever, chills, change in weight  ENT/MOUTH: bad taste in mouth  RESP: NEGATIVE for significant cough or SOB  CV: NEGATIVE for chest pain, palpitations or peripheral edema    Physical Exam:  Vitals Were Reviewed    Patient Vitals for the past 16 hrs:   BP Temp Temp src Pulse Resp SpO2 Weight   09/06/19 0736 (!) 145/58 97.5  F (36.4  C) Oral 82 16 90 % --   09/06/19 0559 -- -- -- -- -- -- 86 kg (189 lb 9.5 oz)   09/06/19 0524 (!) 160/70 98.4  F (36.9  C) Oral 75 18 94 % --   09/06/19 0029 (!) 169/72 98.9  F (37.2  C) Oral 70 20 94 % --   09/05/19 2039 (!) 155/96 99  F (37.2  C) Oral 82 20 94 % --         Intake/Output Summary (Last 24 hours) at 9/6/2019 0950  Last data filed at 9/5/2019 1200  Gross per 24 hour   Intake 400 ml   Output --   Net 400 ml       GENERAL APPEARANCE: healthy, alert and no distress  HENT: left submandibular gland is much smaller and less tender  RESP: lungs clear to auscultation - no rales, rhonchi or wheezes  CV: regular rate and rhythm, normal S1 S2, no S3 or S4 and no murmur, click or rub   ABDOMEN: soft, nontender, no HSM or masses and bowel sounds normal  MS: no clubbing, cyanosis; no edema  SKIN: clear without significant rashes or lesions    Lab:  Recent Labs   Lab Test 09/06/19 0518 09/05/19  1311 09/05/19  0541     --  144   POTASSIUM 3.6 3.6 3.1*   CHLORIDE 109  --  109   CO2 29  --  30   ANIONGAP 5  --  5   *  --  101*   BUN 13  --  13   CR 1.15*  --  1.09*   BLAIR 9.3  --  9.0     CBC RESULTS:   Recent Labs   Lab Test 09/06/19 0518 09/05/19  0541   WBC 6.2 5.3   RBC 3.60* 3.57*   HGB 10.8* 10.7*   HCT 34.6* 34.1*    197       Results for orders placed or performed during the hospital encounter of 09/01/19 (from the past 24 hour(s))   Potassium   Result Value Ref Range    Potassium 3.6 3.4 - 5.3 mmol/L   CBC  with platelets   Result Value Ref Range    WBC 6.2 4.0 - 11.0 10e9/L    RBC Count 3.60 (L) 3.8 - 5.2 10e12/L    Hemoglobin 10.8 (L) 11.7 - 15.7 g/dL    Hematocrit 34.6 (L) 35.0 - 47.0 %    MCV 96 78 - 100 fl    MCH 30.0 26.5 - 33.0 pg    MCHC 31.2 (L) 31.5 - 36.5 g/dL    RDW 13.8 10.0 - 15.0 %    Platelet Count 202 150 - 450 10e9/L   Basic metabolic panel   Result Value Ref Range    Sodium 143 133 - 144 mmol/L    Potassium 3.6 3.4 - 5.3 mmol/L    Chloride 109 94 - 109 mmol/L    Carbon Dioxide 29 20 - 32 mmol/L    Anion Gap 5 3 - 14 mmol/L    Glucose 114 (H) 70 - 99 mg/dL    Urea Nitrogen 13 7 - 30 mg/dL    Creatinine 1.15 (H) 0.52 - 1.04 mg/dL    GFR Estimate 43 (L) >60 mL/min/[1.73_m2]    GFR Estimate If Black 50 (L) >60 mL/min/[1.73_m2]    Calcium 9.3 8.5 - 10.1 mg/dL       Assessment and Plan:     Acute left submandibular sialadenitis.  She was started on Rocephin but changed to  cefuroxime and flagyl per UTD. Negative- MRSA swab positive.   .  Will use heat and massage and IVF hydration plus lemon drops.  ENT is not available but will call for advice.   Presume antibiotic should cover this for now.  She may ultimately need probing of her duct.   On review of CT there may be more dilatation of the duct than reflected in the report. Dr Brady reviewed with ENT yesterday who recommended conservative rx.      Wbc and temp normal. less  Much less swelling.     Nausea, vomiting, weakness. Possibly secondary to the sialadenitis since it started at the same time. CT abd wo new pathology. Appears that foul discharge in mouth makes her gag. Son says she gags chronically     Eating better.     Possible urinary tract infection-ruled out.  She had 34 WBCs per high-powered field and has some worsening incontinence.  UC with mixed weston.     Slight elevation in troponin. trops 0.015--0.046--0.042--0.035   No EKG changes and no chest pains so I suspect this is not acute coronary syndrome and is strain related.      Chronic  kidney disease.  She is approximately at baseline with a creatinine of 1.34.   Now creat is 1.22--1.26--1.09--1.15.     Hypertension.  She was quite hypertensive when she came in.  It has come down now to reasonable levels.   Will use labetalol p.r.n. blood pressures greater than 170/105.      Chronic obstructive pulmonary disease with chronic hypercapnia.  hypoxia -but no distress  Ph 7.4/59 on 9/2/19. Lung volumes low and mild pul vascular congestion on cxr 9/2/19 . Suspect hypoxia is due to chf on top of chronic copd  09/05/19 still on one liter periodically and on RA.     CHF/diastolic dysfunction--with acute exacerbation  Stopping iv fluids due to hypoxia, continue pta lasix 40 bid and gave additional lasix 20 iv 9/3/19.  cxr 09/04/19 -1. No convincing interval change since 9/2/2019 at 0940 hours.  2. Hypoinflated lungs. A band-like opacity in the right lung base and  a small region of patchy opacities in the left lung base most likely  represent atelectasis. Pneumonia cannot be excluded.     Weight is at baseline and down today, off oxygen     Abdominal pain.  upon admit   CT-1. Colon diverticulosis without CT evidence of acute diverticulitis.  2. 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     Now resolved but anorexic      Right hip pain.  This is her primary pain complaint.  This has been ongoing and she has had ongoing steroid injections for this.  It seems like this is more trochanteric bursitis but it is unclear since [facility] and I cannot get those records.  At this point, she has been on OxyContin and will continue that, plus Tylenol plus oxycodone as needed, but try to use that sparingly.      CODE STATUS:  DNR/DNI, per POLST.      PROPHYLAXIS:  Because she is ill, will go ahead and use Lovenox.      DISPOSITION:   Will need to see ENT-I have already  requested an out pt apmnt..  From [facility]--planning on tcu today or when available . Gagging probably due to purulent discharge in mouth-but gags chronically. I think pt can discharge-anorexia will need to be observed. Clostridia difficile is neg.

## 2019-09-06 NOTE — PROGRESS NOTES
9-3-19   CC was notified that member was at [facility] and it was unsure at this time if she where to go back to the AL or need TCU.  CC will wait to be notified and f/u as needed.   Mirian Weldon MA [facility] Coordinator   [phone]

## 2019-09-07 ENCOUNTER — TELEPHONE (OUTPATIENT)
Dept: GERIATRICS | Facility: CLINIC | Age: 84
End: 2019-09-07

## 2019-09-07 LAB
BACTERIA SPEC CULT: NO GROWTH
BACTERIA SPEC CULT: NO GROWTH
Lab: NORMAL
Lab: NORMAL
SPECIMEN SOURCE: NORMAL
SPECIMEN SOURCE: NORMAL

## 2019-09-08 NOTE — TELEPHONE ENCOUNTER
Nursing reported that Jazmin has been complaining of nausea.  No vomiting, just an upset stomach    Zofran 4mg every 6 hours prn for nausea    Electronically signed by Yesy Bang RN, CNP

## 2019-09-09 ENCOUNTER — APPOINTMENT (OUTPATIENT)
Dept: GENERAL RADIOLOGY | Facility: CLINIC | Age: 84
End: 2019-09-09
Attending: FAMILY MEDICINE
Payer: COMMERCIAL

## 2019-09-09 ENCOUNTER — HOSPITAL ENCOUNTER (EMERGENCY)
Facility: CLINIC | Age: 84
Discharge: HOME | End: 2019-09-09
Attending: FAMILY MEDICINE | Admitting: FAMILY MEDICINE
Payer: COMMERCIAL

## 2019-09-09 ENCOUNTER — APPOINTMENT (OUTPATIENT)
Dept: ULTRASOUND IMAGING | Facility: CLINIC | Age: 84
End: 2019-09-09
Attending: FAMILY MEDICINE
Payer: COMMERCIAL

## 2019-09-09 VITALS
OXYGEN SATURATION: 95 % | TEMPERATURE: 98 F | DIASTOLIC BLOOD PRESSURE: 58 MMHG | RESPIRATION RATE: 15 BRPM | BODY MASS INDEX: 34.73 KG/M2 | WEIGHT: 196 LBS | HEIGHT: 63 IN | HEART RATE: 72 BPM | SYSTOLIC BLOOD PRESSURE: 119 MMHG

## 2019-09-09 DIAGNOSIS — R60.0 ARM EDEMA: ICD-10-CM

## 2019-09-09 DIAGNOSIS — M62.81 GENERALIZED MUSCLE WEAKNESS: ICD-10-CM

## 2019-09-09 LAB
ALBUMIN SERPL-MCNC: 3 G/DL (ref 3.4–5)
ALP SERPL-CCNC: 67 U/L (ref 40–150)
ALT SERPL W P-5'-P-CCNC: 27 U/L (ref 0–50)
ANION GAP SERPL CALCULATED.3IONS-SCNC: 7 MMOL/L (ref 3–14)
AST SERPL W P-5'-P-CCNC: 28 U/L (ref 0–45)
BASOPHILS # BLD AUTO: 0 10E9/L (ref 0–0.2)
BASOPHILS NFR BLD AUTO: 0.5 %
BILIRUB DIRECT SERPL-MCNC: <0.1 MG/DL (ref 0–0.2)
BILIRUB SERPL-MCNC: 0.4 MG/DL (ref 0.2–1.3)
BUN SERPL-MCNC: 24 MG/DL (ref 7–30)
CALCIUM SERPL-MCNC: 9.4 MG/DL (ref 8.5–10.1)
CHLORIDE SERPL-SCNC: 108 MMOL/L (ref 94–109)
CO2 SERPL-SCNC: 29 MMOL/L (ref 20–32)
CREAT SERPL-MCNC: 1.12 MG/DL (ref 0.52–1.04)
DIFFERENTIAL METHOD BLD: ABNORMAL
EOSINOPHIL # BLD AUTO: 0.2 10E9/L (ref 0–0.7)
EOSINOPHIL NFR BLD AUTO: 3.6 %
ERYTHROCYTE [DISTWIDTH] IN BLOOD BY AUTOMATED COUNT: 14.2 % (ref 10–15)
GFR SERPL CREATININE-BSD FRML MDRD: 44 ML/MIN/{1.73_M2}
GLUCOSE SERPL-MCNC: 109 MG/DL (ref 70–99)
HCT VFR BLD AUTO: 36.5 % (ref 35–47)
HGB BLD-MCNC: 11.2 G/DL (ref 11.7–15.7)
IMM GRANULOCYTES # BLD: 0 10E9/L (ref 0–0.4)
IMM GRANULOCYTES NFR BLD: 0.3 %
LYMPHOCYTES # BLD AUTO: 1.2 10E9/L (ref 0.8–5.3)
LYMPHOCYTES NFR BLD AUTO: 18.6 %
MCH RBC QN AUTO: 30 PG (ref 26.5–33)
MCHC RBC AUTO-ENTMCNC: 30.7 G/DL (ref 31.5–36.5)
MCV RBC AUTO: 98 FL (ref 78–100)
MONOCYTES # BLD AUTO: 0.6 10E9/L (ref 0–1.3)
MONOCYTES NFR BLD AUTO: 9.9 %
NEUTROPHILS # BLD AUTO: 4.3 10E9/L (ref 1.6–8.3)
NEUTROPHILS NFR BLD AUTO: 67.1 %
NRBC # BLD AUTO: 0 10*3/UL
NRBC BLD AUTO-RTO: 0 /100
NT-PROBNP SERPL-MCNC: 478 PG/ML (ref 0–1800)
PLATELET # BLD AUTO: 244 10E9/L (ref 150–450)
POTASSIUM SERPL-SCNC: 3.8 MMOL/L (ref 3.4–5.3)
PROT SERPL-MCNC: 5.9 G/DL (ref 6.8–8.8)
RBC # BLD AUTO: 3.73 10E12/L (ref 3.8–5.2)
SODIUM SERPL-SCNC: 144 MMOL/L (ref 133–144)
WBC # BLD AUTO: 6.4 10E9/L (ref 4–11)

## 2019-09-09 PROCEDURE — 93005 ELECTROCARDIOGRAM TRACING: CPT | Performed by: FAMILY MEDICINE

## 2019-09-09 PROCEDURE — 85025 COMPLETE CBC W/AUTO DIFF WBC: CPT | Performed by: FAMILY MEDICINE

## 2019-09-09 PROCEDURE — 80076 HEPATIC FUNCTION PANEL: CPT | Performed by: FAMILY MEDICINE

## 2019-09-09 PROCEDURE — 99285 EMERGENCY DEPT VISIT HI MDM: CPT | Mod: 25 | Performed by: FAMILY MEDICINE

## 2019-09-09 PROCEDURE — 93971 EXTREMITY STUDY: CPT | Mod: LT

## 2019-09-09 PROCEDURE — 80048 BASIC METABOLIC PNL TOTAL CA: CPT | Performed by: FAMILY MEDICINE

## 2019-09-09 PROCEDURE — 71046 X-RAY EXAM CHEST 2 VIEWS: CPT

## 2019-09-09 PROCEDURE — 83880 ASSAY OF NATRIURETIC PEPTIDE: CPT | Performed by: FAMILY MEDICINE

## 2019-09-09 PROCEDURE — 99284 EMERGENCY DEPT VISIT MOD MDM: CPT | Mod: 25 | Performed by: FAMILY MEDICINE

## 2019-09-09 PROCEDURE — 93010 ELECTROCARDIOGRAM REPORT: CPT | Mod: Z6 | Performed by: FAMILY MEDICINE

## 2019-09-09 ASSESSMENT — ENCOUNTER SYMPTOMS
FEVER: 0
CHILLS: 0
HEADACHES: 0
PALPITATIONS: 0
BLOOD IN STOOL: 0
VOMITING: 0
DIARRHEA: 0
DYSURIA: 0
ABDOMINAL PAIN: 0
SHORTNESS OF BREATH: 0
WEAKNESS: 1
FREQUENCY: 0
CONSTIPATION: 0
COUGH: 0
SINUS PRESSURE: 0
NAUSEA: 0
SORE THROAT: 0
WHEEZING: 0
DIAPHORESIS: 0

## 2019-09-09 ASSESSMENT — MIFFLIN-ST. JEOR: SCORE: 1298.18

## 2019-09-09 NOTE — ED AVS SNAPSHOT
[facility] Emergency Department  [address] [address]  Phone:  [phone]  Fax:  [phone]                                    Jazmin Clifton   MRN: [id number]    Department:  [facility] Emergency Department   Date of Visit:  9/9/2019           After Visit Summary Signature Page    I have received my discharge instructions, and my questions have been answered. I have discussed any challenges I see with this plan with the nurse or doctor.    ..........................................................................................................................................  Patient/Patient Representative Signature      ..........................................................................................................................................  Patient Representative Print Name and Relationship to Patient    ..................................................               ................................................  Date                                   Time    ..........................................................................................................................................  Reviewed by Signature/Title    ...................................................              ..............................................  Date                                               Time          22EPIC Rev 08/18

## 2019-09-09 NOTE — DISCHARGE INSTRUCTIONS
ICD-10-CM    1. Arm edema R60.0     this appears to be related to a contusion to arm.  No signs of arm DVT.   2. Generalized muscle weakness M62.81     no serious findings on lab testing. return for worsening

## 2019-09-09 NOTE — ED PROVIDER NOTES
History     Chief Complaint   Patient presents with     Arm Pain     [place].      HPI  Jazmin Clifton is a 86 year old female who presents with a history of hypertension, cerebral aneurysm, dementia, generalized weakness, chronic diastolic heart failure, COPD, and prior CVA with a recent diagnosis of sial adenitis and sialolithiasis and was admitted to this facility 9/1/2019 for vague abdominal pain, generalized weakness, acute left submandibular sialoadenitis.  Negative MRSA and on cefuroxime and Flagyl per ENT.  Improved on antibiotics.  Mild elevation of troponin during that hospitalization between 0.015 and 0.046.  Mild hypoxia related to chronic COPD and some mild acute exacerbation of her diastolic congestive heart failure.  Discharged home to TCU 9/5/2019 - 4 days ago.  Presented here by ambulance for concerns of a left arm swelling that she had noted occurred over the last couple of days.  She also continues to feel generally weak.  No increased dyspnea.  Denies fever or redness of the arm.  No arm weakness.    Allergies:  Allergies   Allergen Reactions     Accupril      Ace Inhibitors Unknown     Accupril     Augmentin Unknown     Blood-Group Specific Substance Other (See Comments)     Patient has a Non-specific antibody. Blood Product orders may be delayed.  Draw one red top and two purple top tubes for ALL Type and Screen/ Type and Crossmatch orders.      Levofloxacin Other (See Comments) and Muscle Pain (Myalgia)     Pt prescribed ciprofloxacin in Jan 2018 (no rxn documented)         Morphine Other (See Comments)     hallucinations     Nitrofurantoin Nausea and Vomiting     Norvasc [Amlodipine Besylate] Swelling     Leg swelling       Quinapril Other (See Comments) and Unknown     Hypertension. 3.29.18 - Takes and tolerates lisinopril           Problem List:    Patient Active Problem List    Diagnosis Date Noted     Sialadenitis 09/02/2019     Priority: Medium     Post-operative state 06/10/2019  "    Priority: Medium     Mass of urethra 2019     Priority: Medium     Gross hematuria 2019     Priority: Medium     Anemia of chronic renal failure, stage 4 (severe) (H) 2019     Priority: Medium     COPD exacerbation (H) 05/10/2019     Priority: Medium     Weakness 05/10/2019     Priority: Medium     Shortness of breath 2019     Priority: Medium     Health Care Home 2019     Priority: Medium     [facility] Care Coordinator  Mirian Weldon MA, LSW  [phone]    [facility] CARE PLAN SUMMARY    Member Name:  Jazmin Clifton    Address:  [facility]  [address] [address] [place] [address]    As of 19 (moved to another apt)  Phone: [phone] (home)    :  1932 Date of Assessment: 19  Change in condition visit    Health Plan:  [facility]  Health Plan Number: [id number] Medical Assistance Number: [id number]  Financial Worker:  [facility]   Case #:     [facility] Care Coordinator:  Mirian Weldon MA, LSW CC Phone:  [phone]  CC Fax:  [phone]   FVP Enrollment Date: 19 Case Mix:  D  Rate Cell:  E  Waiver Type: EW     Primary Emergency Contact:  Chi Clifton  Address: [address] [place] [address]  Mobile Phone: [phone]  Relation: Son    Secondary Emergency Contact: Lucius Clifton  Address: [address] [address] [address]   Home Phone: [phone]  Work Phone: [phone]  Relation: Son Language:  English  :  No   Health Care Agent/POA:   Advanced Directives/Living Will:     Primary Care Clinic/Phone/Fax:  [facility]/(p) [phone], (f) [phone] Primary Dx:  COPD J44.9  Secondary Dx: DDD M51.36  Cognitive Impairment G31.84   Primary Physician:  Junie Estrella   Height:  5' 3\"  Weight:  203 lbs   Specialty Physician:    Audiologist:     Eye Care Provider:   Dental Care Provider:  [facility]   [facility] [phone]   Other:        [facility] " CURRENT SERVICES SUMMARY  Equipment owned/DME history:  Member son purchased electric wheelchair for member;    Member has scooter, lift chair, 3 wheeled walker   SERVICE TYPE/PROVIDER NAME/PHONE AUTH DATE FREQUENCY Units OR $ Amt DESCRIPTION   Medical Transportation: [facility] [phone]  Fax:  4-1-19 thru 3-30-20 review annually/PRN  as needed     Assisted Living: [facility] (Assisted Living) [phone] 24 hr [facility]  Fax:  4-1-19 thru 3-30-20 review annually/PRN daily See RS/AL Tool      Supplies: [facility] [phone]  Fax:  4-1-19 thru 3-30-20 review annually/PRN   monthly 1 pull up per day   1 liner per day  XL pull ups     Regular liners      Abraham ESPARZA    [phone]   5-1-19 thru 3-30-20  as needed  Declined 6-1-19                    Acute kidney injury (H) 01/11/2019     Priority: Medium     Hip pain 10/29/2018     Priority: Medium     Impaired ambulation 10/29/2018     Priority: Medium     Morbid obesity (H) 06/19/2018     Priority: Medium     Multiple closed fractures of metatarsal bone 05/28/2018     Priority: Medium     Right groin mass 05/03/2018     Priority: Medium     Chronic diastolic congestive heart failure (H) 02/02/2018     Priority: Medium     History of stroke 02/02/2018     Priority: Medium     Mild cognitive impairment 09/22/2017     Priority: Medium     S/P carotid endarterectomy 09/06/2017     Priority: Medium     Underwent for symptomatic right carotid artery stenosis        Carotid stenosis, symptomatic w/o infarct, right 08/31/2017     Priority: Medium     Thyroid nodule 08/23/2017     Priority: Medium     Trigger point of extremity 08/23/2017     Priority: Medium     Trochanteric bursitis of right hip 08/23/2017     Priority: Medium     CKD (chronic kidney disease) stage 3, GFR 30-59 ml/min (H) 08/16/2017     Priority: Medium     Transient cerebral ischemia, unspecified type 08/01/2017     Priority: Medium     CVA (cerebral  vascular accident) (H) 07/26/2017     Priority: Medium     Chronic obstructive pulmonary disease, unspecified COPD type (H) 07/25/2017     Priority: Medium     Generalized muscle weakness 07/25/2017     Priority: Medium     Dizziness 07/25/2017     Priority: Medium     Osteoarthritis of right hip, unspecified osteoarthritis type 07/25/2017     Priority: Medium     Alzheimer's dementia without behavioral disturbance 05/12/2017     Priority: Medium     Retention of urine 04/15/2017     Priority: Medium     History of intracranial aneurysm 04/14/2017     Priority: Medium     Postherpetic neuralgia 11/14/2016     Priority: Medium     Umbilical hernia without obstruction and without gangrene 06/03/2016     Priority: Medium     Spinal stenosis of lumbar region 01/11/2016     Priority: Medium     Spondylolisthesis, lumbar region 01/11/2016     Priority: Medium     BPPV (benign paroxysmal positional vertigo) 11/27/2014     Priority: Medium     Dyspepsia 11/27/2014     Priority: Medium     Female stress incontinence 11/27/2014     Priority: Medium     History of bradycardia 11/27/2014     Priority: Medium     History of DVT (deep vein thrombosis) 11/27/2014     Priority: Medium     History of herpes zoster 11/27/2014     Priority: Medium     Constipation 10/20/2012     Priority: Medium     Rectocele 08/31/2012     Priority: Medium     Hip joint replacement status 04/18/2012     Priority: Medium     Osteoarthritis 04/18/2012     Priority: Medium     DDD (degenerative disc disease), lumbar 04/18/2012     Priority: Medium     Mild major depression (H) 04/18/2012     Priority: Medium     Incontinence of urine 04/18/2012     Priority: Medium     Hyperlipidemia 12/07/2011     Priority: Medium     Osteoporosis 10/25/2011     Priority: Medium     S/P laminectomy 10/25/2011     Priority: Medium     CARDIOVASCULAR SCREENING; LDL GOAL LESS THAN 100 10/31/2010     Priority: Medium     Normocytic anemia 02/17/2010     Priority: Medium      CHF (congestive heart failure) (H) 09/17/2008     Priority: Medium     Diastolic disfunction - ECHP 2008       Restrictive lung disease 09/17/2008     Priority: Medium     PFT 4/2008       Renovascular hypertension 11/09/2006     Priority: Medium     Problem list name updated by automated process. Provider to review       Benign neoplasm of colon 10/04/2006     Priority: Medium     Angiodysplasia - due for repeat 10 years.  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 NO edema. Continue current Therapy.HYPERTENSION: Dynacirc CR 10mg daily initiated today. Will continue adjusting blood pressure medicatins as needed until readings at target.VITAMIN D  DEFICIENCY - Completed therapy, discontinue ergocalciferol.ANEMIA, EPO DEFICIENCY - Hgb 10.2. Not on EPO. Continue observation for now. Recnet Hgb drop due to lumbar laminectomy.  Problem list name updated by automated process. Provider to review       Essential hypertension, benign 05/01/2006     Priority: Medium     Atherosclerosis of renal artery (H)      Priority: Medium     Left renal artery stenosis       Esophageal reflux      Priority: Medium     Gastroesophageal Reflux Disease       Cerebral aneurysm, nonruptured      Priority: Medium     Cerebral Aneurysm - unchanged on rcent MRI.  Seen by neurosurg.  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     Priority: Medium        Past Medical History:    Past Medical  History:   Diagnosis Date     ABDOMINAL PAIN GENERALIZED 3/15/2006     Abdominal pain, generalized 3/15/2006     Atherosclerosis of renal artery (H)      BENIGN HYPERTENSION 5/1/2006     Benign neoplasm of scalp and skin of neck      Cerebral aneurysm, nonruptured      Congestive heart failure (H)      Depressive disorder, not elsewhere classified      Esophageal reflux      Female stress incontinence      Gastrointestinal malfunction arising from mental factors      Generalized osteoarthrosis, unspecified site      Herpes zoster dermatitis of eyelid      Insomnia, unspecified      Lumbago      Other chest pain      Other specified cardiac dysrhythmias(427.89)      Rectocele      Unspecified disorder of kidney and ureter      Unspecified essential hypertension        Past Surgical History:    Past Surgical History:   Procedure Laterality Date     CHOLECYSTECTOMY, LAPOROSCOPIC  1997    Cholecystectomy, Laparoscopic     CYSTOSCOPY, BIOPSY URETHRA N/A 6/10/2019    Procedure: Cystoscopy With Biopsy, Removal Of Urethral Lesion;  Surgeon: Yesy Fong MD;  Location: [facility] OR     ENDARTERECTOMY CAROTID Right 8/31/2017    Procedure: ENDARTERECTOMY CAROTID;  RIGHT CAROTID ENDARTERECTOMY WITH EEG;  Surgeon: Hilario Parry MD;  Location: [facility] OR     HYSTERECTOMY, RAYMOND  1982    oophorectomy,RAYMOND     SURGICAL HISTORY OF -       Laminectomy x 3     SURGICAL HISTORY OF -       MCA Aneurysm repair     SURGICAL HISTORY OF -   1996    Bladder suspension (Bladder Repair x2)     SURGICAL HISTORY OF -       Bilateral Hand Surgeries for Arthritis x2     SURGICAL HISTORY OF -       Repair of a Cerebral Aneurysm     URETHROPLASTY N/A 6/10/2019    Procedure: Urethral Reconstruction;  Surgeon: Yesy Fong MD;  Location: [facility] OR       Family History:    Family History   Problem Relation Age of Onset     Cancer Mother         stomache     Cerebrovascular Disease Father      Heart Disease Father      Cancer Brother          lymphoma     Eye Disorder Son      Asthma Son      Diabetes Son      Gastrointestinal Disease Son         no control over bladder or bowels     C.A.D. No family hx of      Hypertension No family hx of      Breast Cancer No family hx of      Cancer - colorectal No family hx of      Prostate Cancer No family hx of        Social History:  Marital Status:   [5]  Social History     Tobacco Use     Smoking status: Former Smoker     Last attempt to quit: 1992     Years since quittin.7     Smokeless tobacco: Never Used   Substance Use Topics     Alcohol use: Yes     Comment: wine occas.     Drug use: No        Medications:      acetaminophen (TYLENOL) 500 MG tablet   ASPIRIN LOW DOSE 81 MG chewable tablet   atorvastatin (LIPITOR) 40 MG tablet   bisacodyl (DULCOLAX) 10 MG suppository   Blood Glucose Monitoring Suppl CORY   BREO ELLIPTA 100-25 MCG/INH inhaler   calcium carbonate (TUMS) 500 MG chewable tablet   cefuroxime (CEFTIN) 500 MG tablet   DONEPEZIL HCL PO   DULoxetine (CYMBALTA) 30 MG capsule   Emollient (MOISTURIZING LOTION EX)   fluticasone (FLONASE) 50 MCG/ACT nasal spray   furosemide (LASIX) 40 MG tablet   gabapentin (NEURONTIN) 100 MG capsule   Hypromellose (NATURAL BALANCE TEARS OP)   isradipine (DYNACIRC) 5 MG capsule   levalbuterol (XOPENEX) 1.25 MG/3ML neb solution   Lidocaine (LIDOCARE) 4 % Patch   lisinopril (PRINIVIL/ZESTRIL) 20 MG tablet   magnesium hydroxide (MILK OF MAGNESIA) 400 MG/5ML suspension   MAGNESIUM OXIDE PO   Menthol, Topical Analgesic, (BIOFREEZE) 4 % GEL   metroNIDAZOLE (FLAGYL) 250 MG tablet   naloxone (NARCAN) 4 MG/0.1ML nasal spray   NIFEdipine ER (ADALAT CC) 30 MG 24 hr tablet   nystatin (MYCOSTATIN) 969734 UNIT/GM external powder   OMEPRAZOLE PO   ondansetron (ZOFRAN) 4 MG tablet   oxyCODONE (OXYCONTIN) 10 MG 12 hr tablet   oxyCODONE (ROXICODONE) 5 MG tablet   phenazopyridine (PYRIDIUM) 100 MG tablet   senna-docusate (SENOKOT-S/PERICOLACE) 8.6-50 MG tablet   tiZANidine  "(ZANAFLEX) 2 MG tablet   vitamin D3 (CHOLECALCIFEROL) 1000 units (25 mcg) tablet         Review of Systems   Constitutional: Negative for chills, diaphoresis and fever.   HENT: Negative for ear pain, sinus pressure and sore throat.    Eyes: Negative for visual disturbance.   Respiratory: Negative for cough, shortness of breath and wheezing.    Cardiovascular: Negative for chest pain and palpitations.   Gastrointestinal: Negative for abdominal pain, blood in stool, constipation, diarrhea, nausea and vomiting.   Genitourinary: Negative for dysuria, frequency and urgency.   Skin: Negative for rash.   Neurological: Positive for weakness. Negative for headaches.   All other systems reviewed and are negative.      Physical Exam   BP: 130/58  Pulse: 74  Heart Rate: 93  Temp: 98  F (36.7  C)  Resp: 18  Height: 160 cm (5' 3\")  Weight: 88.9 kg (196 lb)  SpO2: 92 %      Physical Exam   Constitutional: No distress.   HENT:   Mouth/Throat: Oropharynx is clear and moist.   Eyes: Conjunctivae are normal.   Cardiovascular: Normal rate, regular rhythm and normal heart sounds. Exam reveals no friction rub.   No murmur heard.  Pulmonary/Chest: Effort normal and breath sounds normal. No stridor. No respiratory distress. She has no wheezes. She has no rales.   Abdominal: Soft. Bowel sounds are normal. She exhibits no distension and no mass. There is tenderness. There is no rebound and no guarding.   Musculoskeletal: She exhibits edema (left arm c/w right, but no pitting.).   Skin: No rash noted. She is not diaphoretic. No pallor.     We pulses.  Normal median radial and ulnar nerve function motor and sensory in the left arm.  Normal range of motion of the hand wrist elbow.  No axillary adenopathy but some tenderness to palpation.    Neck lesion is palpated consistent with LT  sialoadenitis of the submandibular gland.  No erythema and minimal swelling here.  It is still firm.    ED Course        Procedures                 EKG " Interpretation:      Interpreted by Roberto Emery MD  EKG at 0907 hrs. demonstrates may be a sinus rhythm with P waves seen in some leads but could also be atrial fibrillation.  She has normal R progression.  There are inferior Q waves in leads aVF and lead III.  There is no ectopy.  Normal conduction.  Borderline QRS of 120.  Other intervals are normal with exception that the IA is difficult to interpret in several leads.  Impression sinus rhythm 70 bpm and no substantial change from EKG done 9/2/2019 though on that EKG there was minimal ST depression in V5 V6    Critical Care time:  none               Results for orders placed or performed during the hospital encounter of 09/09/19 (from the past 24 hour(s))   CBC with platelets, differential   Result Value Ref Range    WBC 6.4 4.0 - 11.0 10e9/L    RBC Count 3.73 (L) 3.8 - 5.2 10e12/L    Hemoglobin 11.2 (L) 11.7 - 15.7 g/dL    Hematocrit 36.5 35.0 - 47.0 %    MCV 98 78 - 100 fl    MCH 30.0 26.5 - 33.0 pg    MCHC 30.7 (L) 31.5 - 36.5 g/dL    RDW 14.2 10.0 - 15.0 %    Platelet Count 244 150 - 450 10e9/L    Diff Method Automated Method     % Neutrophils 67.1 %    % Lymphocytes 18.6 %    % Monocytes 9.9 %    % Eosinophils 3.6 %    % Basophils 0.5 %    % Immature Granulocytes 0.3 %    Nucleated RBCs 0 0 /100    Absolute Neutrophil 4.3 1.6 - 8.3 10e9/L    Absolute Lymphocytes 1.2 0.8 - 5.3 10e9/L    Absolute Monocytes 0.6 0.0 - 1.3 10e9/L    Absolute Eosinophils 0.2 0.0 - 0.7 10e9/L    Absolute Basophils 0.0 0.0 - 0.2 10e9/L    Abs Immature Granulocytes 0.0 0 - 0.4 10e9/L    Absolute Nucleated RBC 0.0    Basic metabolic panel   Result Value Ref Range    Sodium 144 133 - 144 mmol/L    Potassium 3.8 3.4 - 5.3 mmol/L    Chloride 108 94 - 109 mmol/L    Carbon Dioxide 29 20 - 32 mmol/L    Anion Gap 7 3 - 14 mmol/L    Glucose 109 (H) 70 - 99 mg/dL    Urea Nitrogen 24 7 - 30 mg/dL    Creatinine 1.12 (H) 0.52 - 1.04 mg/dL    GFR Estimate 44 (L) >60 mL/min/[1.73_m2]    GFR  Estimate If Black 51 (L) >60 mL/min/[1.73_m2]    Calcium 9.4 8.5 - 10.1 mg/dL   Hepatic panel   Result Value Ref Range    Bilirubin Direct <0.1 0.0 - 0.2 mg/dL    Bilirubin Total 0.4 0.2 - 1.3 mg/dL    Albumin 3.0 (L) 3.4 - 5.0 g/dL    Protein Total 5.9 (L) 6.8 - 8.8 g/dL    Alkaline Phosphatase 67 40 - 150 U/L    ALT 27 0 - 50 U/L    AST 28 0 - 45 U/L   Nt probnp inpatient (BNP)   Result Value Ref Range    N-Terminal Pro BNP Inpatient 478 0 - 1,800 pg/mL   US Upper Extremity Venous Duplex Left    Narrative    PROCEDURE:  Venous Doppler ultrasound of the left upper extremity    DATE OF PROCEDURE:  9/9/2019 10:17 AM    CLINICAL HISTORY/INDICATION:   arm edema left    COMPARISON:   None relevant    TECHNIQUE:   Grayscale, color-flow, and spectral waveform analysis were performed  of the deep veins of the left upper extremity    FINDINGS:   The left jugular vein demonstrates normal compressibility, color-flow  and spectral waveform.    The left subclavian vein, axillary vein, cephalic vein, brachial vein  and basilic vein demonstrate normal compressibility, spectral  waveform, color flow and augmentation.    Anechoic, 6.7 x 1.9 x 2.7 cm, fluid collection surrounding the  neurovascular bundle in the area of upper arm pain.      Impression    IMPRESSION:   1.  No evidence of deep venous thrombosis in the left upper extremity   2.  6.7 x 1.9 x 2.7 cm fluid collection in the area of trauma/pain.    HAMZAH PHELPS MD   XR Chest 2 Views    Narrative    CHEST TWO VIEWS  9/9/2019 11:29 AM     HISTORY: Weakness.      Impression    IMPRESSION: Right lung linear atelectasis/volume loss with right  hemidiaphragmatic elevation, similar if not unchanged compared to  9/4/2019. Left apical calcified granuloma. No apparent pleural  effusion.       Medications - No data to display    Assessments & Plan (with Medical Decision Making)       MDM: Jazmin Clifton is a 86 year old female presented with left arm edema of noted over the last  couple of days after recent hospitalization for sialadenitis/sialolithiasis left side and currently on antibiotics - improving.  Continues to feel generalized weakness.  No obvious increased work of breathing, hypoxia lower extremity edema.  She is at risk for upper extremity venous thromboembolism after having had IV access placed last week.  She also could have a more central obstruction such as a vena cava syndrome however this would be much less likely given the opposite arm and face do not appear to be involved.  She does however have the sial adenitis.  Currently no signs of worsening infection related to the sialoadenitis.    Will evaluate with an ultrasound of the left upper extremity, BNP electrolytes with renal function liver function blood count.    Findings are reassuring.  There is is a soft tissue collection within the upper arm seen on ultrasound that is consistent with contusion.  There is no obvious abscess here other swelling.  No bony abnormalities.  No deformities.  Normal distal pulses.  The ultrasound is negative for DVT.     Related to generalized weakness her laboratory testing is reassuring.  No serious findings.  Precautions given for return. closer interval follow-up      I have reviewed the nursing notes.    I have reviewed the findings, diagnosis, plan and need for follow up with the patient.       New Prescriptions    No medications on file       Final diagnoses:   Arm edema - this appears to be related to a contusion to arm.  No signs of arm DVT.   Generalized muscle weakness - no serious findings on lab testing. return for worsening       9/9/2019   [facility] EMERGENCY DEPARTMENT     Roberto Emery MD  09/09/19 [id number]

## 2019-09-09 NOTE — TELEPHONE ENCOUNTER
Nifedipine medication not available.  Is on Lisinopril 20mg twice a day.  Might not get the medication in until Monday.      Current B/P = 149/67    Question of Mg.  Needs 250mg daily but only have 400mg.    Orders:  Ok to hold nifedipine until it arrives.  Continue to monitor B/P per protocol  Call if B/P >180-90  Ok to give Mg 400mg today and hold tomorrow    Electronically signed by Yesy Bang RN, CNP

## 2019-09-10 VITALS
RESPIRATION RATE: 18 BRPM | WEIGHT: 192 LBS | DIASTOLIC BLOOD PRESSURE: 68 MMHG | SYSTOLIC BLOOD PRESSURE: 128 MMHG | BODY MASS INDEX: 34.02 KG/M2 | HEART RATE: 80 BPM | HEIGHT: 63 IN | OXYGEN SATURATION: 96 % | TEMPERATURE: 98 F

## 2019-09-10 DIAGNOSIS — G89.4 CHRONIC PAIN SYNDROME: ICD-10-CM

## 2019-09-10 DIAGNOSIS — M51.369 DDD (DEGENERATIVE DISC DISEASE), LUMBAR: ICD-10-CM

## 2019-09-10 RX ORDER — OXYCODONE HYDROCHLORIDE 5 MG/1
5 TABLET ORAL DAILY PRN
Qty: 30 TABLET | Refills: 0 | Status: ON HOLD | OUTPATIENT
Start: 2019-09-10 | End: 2019-01-01

## 2019-09-10 RX ORDER — OXYCODONE HCL 10 MG/1
10 TABLET, FILM COATED, EXTENDED RELEASE ORAL EVERY 12 HOURS
Qty: 60 TABLET | Refills: 0 | Status: SHIPPED | OUTPATIENT
Start: 2019-09-10 | End: 2019-10-04

## 2019-09-10 ASSESSMENT — MIFFLIN-ST. JEOR: SCORE: 1280.04

## 2019-09-11 ENCOUNTER — NURSING HOME VISIT (OUTPATIENT)
Dept: GERIATRICS | Facility: CLINIC | Age: 84
End: 2019-09-11
Payer: COMMERCIAL

## 2019-09-11 DIAGNOSIS — G31.84 MILD COGNITIVE IMPAIRMENT: ICD-10-CM

## 2019-09-11 DIAGNOSIS — N18.30 CKD (CHRONIC KIDNEY DISEASE) STAGE 3, GFR 30-59 ML/MIN (H): ICD-10-CM

## 2019-09-11 DIAGNOSIS — I50.32 CHRONIC DIASTOLIC CONGESTIVE HEART FAILURE (H): ICD-10-CM

## 2019-09-11 DIAGNOSIS — M51.369 DDD (DEGENERATIVE DISC DISEASE), LUMBAR: ICD-10-CM

## 2019-09-11 DIAGNOSIS — K11.20 SIALADENITIS: Primary | ICD-10-CM

## 2019-09-11 DIAGNOSIS — I63.9 CEREBROVASCULAR ACCIDENT (CVA), UNSPECIFIED MECHANISM (H): ICD-10-CM

## 2019-09-11 DIAGNOSIS — G89.4 CHRONIC PAIN SYNDROME: ICD-10-CM

## 2019-09-11 PROCEDURE — 99310 SBSQ NF CARE HIGH MDM 45: CPT | Performed by: NURSE PRACTITIONER

## 2019-09-11 NOTE — LETTER
9/11/2019        RE: Jazmin Clifton  [address]  [place] [address]        [facility] [facility]  PRIMARY CARE PROVIDER AND CLINIC:  MARY Gómez [facility], [address] [place] [address]  Chief Complaint   Patient presents with     Hospital F/U     [place] Medical Record Number:  [id number]  Place of Service where encounter took place:  [facility] ([facility]) [[id number]]    Jazmin Clifton  is a 86 year old  (12/30/1932), admitted to the above facility from  [facility]. Hospital stay 9/1/19 through 9/6/19..  Admitted to this facility for  rehab, medical management and nursing care.    HPI:    HPI information obtained from: facility chart records, facility staff, patient report and [facility] chart review.   Brief Summary of Hospital Course: Jazmin Clifton has a past medical history of CVA, CHF, CKD with baseline creat 1.1-1.3, COPD, HTN, obesity, dementia.  She had been working with dentist to have several teeth removed.  S/he was admitted to the hospital with 3 day history of malaise, weakness, N/V, swelling of L jaw with tendereness and found to have acute L submandibular sialadenitis.  S/he was started on cefuroxime, metronidazole for 10 more days . The hospital stay was uneventful, and a TCU stay was planned.  On 9/9/19, she was noted to have acute swelling of L arm as well as some facial droop with concerns for new CVA and was evaluated in the ED with no new findings and she was returned to the TCU.    Updates on Status Since Skilled nursing Admission: Jazmin reports ongoing pain in her mouth with a bad taste (using lemon drops), difficulty chewing due to lack of teeth, some mild diarrhea, and generalized malaise.  She is planning to move [facility] [place] [facility] as she lives in the assisted living facility in that building.  Nursing reports needs some clarifications and orders for discharge to another TCU.       CODE  STATUS/ADVANCE DIRECTIVES DISCUSSION:   DNR / DNI  Patient's living condition: lives in an assisted living facility  ALLERGIES: Accupril; Ace inhibitors; Augmentin; Blood-group specific substance; Levofloxacin; Morphine; Nitrofurantoin; Norvasc [amlodipine besylate]; and Quinapril  PAST MEDICAL HISTORY:  has a past medical history of ABDOMINAL PAIN GENERALIZED (3/15/2006), Abdominal pain, generalized (3/15/2006), Atherosclerosis of renal artery (H), BENIGN HYPERTENSION (5/1/2006), Benign neoplasm of scalp and skin of neck, Cerebral aneurysm, nonruptured, Congestive heart failure (H), Depressive disorder, not elsewhere classified, Esophageal reflux, Female stress incontinence, Gastrointestinal malfunction arising from mental factors, Generalized osteoarthrosis, unspecified site, Herpes zoster dermatitis of eyelid, Insomnia, unspecified, Lumbago, Other chest pain, Other specified cardiac dysrhythmias(427.89), Rectocele, Unspecified disorder of kidney and ureter, and Unspecified essential hypertension.  PAST SURGICAL HISTORY:   has a past surgical history that includes surgical history of - ; surgical history of - ; surgical history of -  (1996); surgical history of - ; surgical history of - ; cholecystectomy, laporoscopic (1997); hysterectomy, mirtha (1982); Endarterectomy carotid (Right, 8/31/2017); Cystoscopy, Biopsy Urethra (N/A, 6/10/2019); and Urethroplasty (N/A, 6/10/2019).  FAMILY HISTORY: family history includes Asthma in her son; Cancer in her brother and mother; Cerebrovascular Disease in her father; Diabetes in her son; Eye Disorder in her son; Gastrointestinal Disease in her son; Heart Disease in her father.  SOCIAL HISTORY:   reports that she quit smoking about 27 years ago. She has never used smokeless tobacco. She reports that she drinks alcohol. She reports that she does not use drugs.    Post Discharge Medication Reconciliation Status: discharge medications reconciled and changed, per note/orders (see  AVS)    Current Outpatient Medications   Medication Sig Dispense Refill     acetaminophen (TYLENOL) 500 MG tablet Take 1,000 mg by mouth 3 times daily       ASPIRIN LOW DOSE 81 MG chewable tablet CHEW AND SWALLOW ONE TABLET BY MOUTH ONCE DAILY 30 tablet 11     atorvastatin (LIPITOR) 40 MG tablet TAKE 1 TABLET BY MOUTH ONCE DAILY 30 tablet 98     Blood Glucose Monitoring Suppl CORY 2 times daily Call nurse for further directions if blood glucose is less than 80 or greater than 250       BREO ELLIPTA 100-25 MCG/INH inhaler INHALE 1 PUFF BY MOUTH ONCE DAILY 1 Inhaler 11     calcium carbonate (TUMS) 500 MG chewable tablet Take 1 chew tab by mouth every hour as needed for heartburn       cefuroxime (CEFTIN) 500 MG tablet Take 500 mg by mouth every 12 hours 20 tablet 0     DONEPEZIL HCL PO Take 5 mg by mouth At Bedtime        DULoxetine (CYMBALTA) 30 MG capsule Take 2 capsules (60 mg) by mouth daily AND 1 capsule (30 mg) At Bedtime. 90 capsule 11     furosemide (LASIX) 40 MG tablet Take 1 tablet (40 mg) by mouth 2 times daily 60 tablet 11     gabapentin (NEURONTIN) 100 MG capsule Take 2 capsules (200 mg) by mouth 2 times daily 120 capsule 11     Hypromellose (NATURAL BALANCE TEARS OP) Place 1 drop into both eyes every 6 hours as needed (to both eyes)        levalbuterol (XOPENEX) 1.25 MG/3ML neb solution Take 1 ampule by nebulization every 4 hours as needed for shortness of breath / dyspnea or wheezing And every 4 hours PRN       Lidocaine (LIDOCARE) 4 % Patch Place 1 patch onto the skin daily as needed To desired area (shoulder or hip). On for 12hrs, off for 12hrs       lisinopril (PRINIVIL/ZESTRIL) 20 MG tablet Take 1 tablet (20 mg) by mouth 2 times daily 62 tablet 98     MAGNESIUM OXIDE PO Take 400 mg by mouth every other day       Menthol, Topical Analgesic, (BIOFREEZE) 4 % GEL Externally apply topically 4 times daily as needed To left shoulder and right hip       NIFEdipine ER (ADALAT CC) 30 MG 24 hr tablet Take 30  "mg by mouth daily       nystatin (MYCOSTATIN) 239207 UNIT/GM external powder APPLY TOPICALLY TO ABDOMEN FOLDS, GROIN, AND BREASTS TWICE DAILY AS NEEDED 60 g 97     OMEPRAZOLE PO Take 40 mg by mouth every morning       ondansetron (ZOFRAN) 4 MG tablet Take 4 mg by mouth every 6 hours as needed for nausea       oxyCODONE (OXYCONTIN) 10 MG 12 hr tablet Take 1 tablet (10 mg) by mouth every 12 hours maximum 2 tablet(s) per day 60 tablet 0     oxyCODONE (ROXICODONE) 5 MG tablet Take 1 tablet (5 mg) by mouth daily as needed for severe pain 30 tablet 0     phenazopyridine (PYRIDIUM) 100 MG tablet Take 1 tablet (100 mg) by mouth 3 times daily as needed for urinary tract discomfort 9 tablet 1     senna-docusate (SENOKOT-S/PERICOLACE) 8.6-50 MG tablet Take 1 tablet by mouth 2 times daily as needed for constipation        tiZANidine (ZANAFLEX) 2 MG tablet TAKE 1 TABLET BY MOUTH THREE TIMES DAILY AS NEEDED 12 tablet 0     vitamin D3 (CHOLECALCIFEROL) 1000 units (25 mcg) tablet Take 1 tablet (1,000 Units) by mouth daily 60 tablet 11     metroNIDAZOLE (FLAGYL) 250 MG tablet Take 250 mg by mouth 2 times daily 20 tablet      ROS:  4 point ROS including Respiratory, CV, GI and , other than that noted in the HPI,  is negative    Vitals:  /68   Pulse 80   Temp 98  F (36.7  C)   Resp 18   Ht 1.6 m (5' 3\")   Wt 87.1 kg (192 lb)   SpO2 96%   BMI 34.01 kg/m     Exam:  GENERAL APPEARANCE:  Alert, in no acute distress   HEAD:  Normal, normocephalic, atraumatic  ENT:  Mouth and posterior oropharynx normal, moist mucous membranes, hearing acuity - within normal limits , neck with walnut sized firm painful mass on L submandibular area  EYE EXAM: normal external eye, conjunctiva, lids, PAPI  CHEST/RESP:  respiratory effort normal, lung sounds CTA , no respiratory distress  CV:  Rate reg, rhythm reg, no murmur, 2+ peripheral edema-not wearing compriflex garments  M/S:   extremities normal, gait abnormal-walking only a few steps " with therapy   ABD/Groin: firm, tender to touch mass in R groin/top of thigh which she reports is bigger today than normal   NEUROLOGIC EXAM: Normal gross motor movement, tone and coordination. No tremor. Cranial nerves 2-12 are normal tested and grossly at patient's baseline  PSYCH:  Alert and oriented to person-place-time , affect pleasant      Lab/Diagnostic data:  Recent labs in [facility] reviewed by me today.     ASSESSMENT/PLAN:  Sialadenitis  Completing dual antibiotics therapy with cefuroxime and metronidazole-to complete a 10 day course (done about 9/16/19).  Still with firm tender mass L neck, foul taste in her mouth  -lemon drops for foul taste in mouth  -soft foods diet for comfort  -complete antibiotics  -ENT referral on 9/13/19 planned  Future Appointments   Date Time Provider Department Center   9/13/2019  9:30 AM Ole Mendoza MD [facility]     Chronic diastolic congestive heart failure (H)  On lasix at baseline and uses compriflex garments for bilateral LE edema.  No significant dyspnea, cough, congestion different from baseline.  She does not have these garments with her, but can resume them upon her return to [facility]. stable    CKD (chronic kidney disease) stage 3, GFR 30-59 ml/min (H)  Baseline creat 1.1-1.3.  Now at 1.15, stable.    -Avoid nephrotoxic medications  -Renal dosing of medications  -monitor kidney function routinely      Cerebrovascular accident (CVA), unspecified mechanism (H)  Mild cognitive impairment  Per history, on donepezil at baseline with no changes.     DDD (degenerative disc disease), lumbar  Chronic pain syndrome  Stable on oxycontin and oxycodone prn. Rxs provided for transition to TCU    Essential Hypertension  Patient on lisinopril and isradipine at baseline.  During her stay at [facility], the pharmacy was unable to obtain the isradipine so nifedipine XL 30 mg daily substituted.  -consider resumption of isradipine at 5 mg BID    -magnesium oxide 250 mg  daily also not available from pharmacy at [facility], so magnesium oxide 400 mg every other day was substituted.  May benefit from lab draw to determine ongoing needs for magnesium supplement.     -noted upon review of records that pyridium and Vit D was discontinued in a home assisted living facility visit several months ago, but was resumed upon her hospital admission, would recommend re-evaluation by PCP.        Orders written by provider at facility and transcribed by : Kaity Perry CMA  1.  Discontinue the following due to non-use:   MOM   Dulc. Supp.  2.  Diagnosis clarified on OSR.  3.  Discontinue BID blood sugar checks-no evidence of DM2 on record.  4.  [facility].  5.  Follow-up with in-house team at next appt.  6.  Written Rx's provided for Oxycodone 5 mg 1 tab po every day prn pain.  #30. And OxyContin ER 10 mg 1 tab po q 12 h.  Dx:  Chronic pain.  #30.      Total time spent with patient visit at the skilled nursing facility was 35 min including patient visit and review of past records. Greater than 50% of total time spent with counseling and coordinating care due to coordinating care with nurse on admission orders and coordinating care with follow up labs and appointments.    Electronically signed by:  MARY Tejada CNP                       Sincerely,        MARY Tejada CNP

## 2019-09-11 NOTE — PROGRESS NOTES
[facility] [facility]  PRIMARY CARE PROVIDER AND CLINIC:  MARY Gómez CNP, [address] [address] / [place] [address]  Chief Complaint   Patient presents with     Hospital F/U     [facility] Medical Record Number:  [id number]  Place of Service where encounter took place:  [facility] ([facility]) [[id number]]    Jazmin Clifton  is a 86 year old  (12/30/1932), admitted to the above facility from  [facility]. Hospital stay 9/1/19 through 9/6/19..  Admitted to this facility for  rehab, medical management and nursing care.    HPI:    HPI information obtained from: facility chart records, facility staff, patient report and [facility] chart review.   Brief Summary of Hospital Course: Jazmin Clifton has a past medical history of CVA, CHF, CKD with baseline creat 1.1-1.3, COPD, HTN, obesity, dementia.  She had been working with dentist to have several teeth removed.  S/he was admitted to the hospital with 3 day history of malaise, weakness, N/V, swelling of L jaw with tendereness and found to have acute L submandibular sialadenitis.  S/he was started on cefuroxime, metronidazole for 10 more days . The hospital stay was uneventful, and a TCU stay was planned.  On 9/9/19, she was noted to have acute swelling of L arm as well as some facial droop with concerns for new CVA and was evaluated in the ED with no new findings and she was returned to the TCU.    Updates on Status Since Skilled nursing Admission: Jazmin reports ongoing pain in her mouth with a bad taste (using lemon drops), difficulty chewing due to lack of teeth, some mild diarrhea, and generalized malaise.  She is planning to [facility] [place] [facility] 9[facility]12/19 as she lives in the assisted living facility in that building.  Nursing reports needs some clarifications and orders for discharge to another TCU.       CODE STATUS/ADVANCE DIRECTIVES DISCUSSION:   DNR / DNI  Patient's living condition: lives in an assisted  living facility  ALLERGIES: Accupril; Ace inhibitors; Augmentin; Blood-group specific substance; Levofloxacin; Morphine; Nitrofurantoin; Norvasc [amlodipine besylate]; and Quinapril  PAST MEDICAL HISTORY:  has a past medical history of ABDOMINAL PAIN GENERALIZED (3/15/2006), Abdominal pain, generalized (3/15/2006), Atherosclerosis of renal artery (H), BENIGN HYPERTENSION (5/1/2006), Benign neoplasm of scalp and skin of neck, Cerebral aneurysm, nonruptured, Congestive heart failure (H), Depressive disorder, not elsewhere classified, Esophageal reflux, Female stress incontinence, Gastrointestinal malfunction arising from mental factors, Generalized osteoarthrosis, unspecified site, Herpes zoster dermatitis of eyelid, Insomnia, unspecified, Lumbago, Other chest pain, Other specified cardiac dysrhythmias(427.89), Rectocele, Unspecified disorder of kidney and ureter, and Unspecified essential hypertension.  PAST SURGICAL HISTORY:   has a past surgical history that includes surgical history of - ; surgical history of - ; surgical history of -  (1996); surgical history of - ; surgical history of - ; cholecystectomy, laporoscopic (1997); hysterectomy, mirtha (1982); Endarterectomy carotid (Right, 8/31/2017); Cystoscopy, Biopsy Urethra (N/A, 6/10/2019); and Urethroplasty (N/A, 6/10/2019).  FAMILY HISTORY: family history includes Asthma in her son; Cancer in her brother and mother; Cerebrovascular Disease in her father; Diabetes in her son; Eye Disorder in her son; Gastrointestinal Disease in her son; Heart Disease in her father.  SOCIAL HISTORY:   reports that she quit smoking about 27 years ago. She has never used smokeless tobacco. She reports that she drinks alcohol. She reports that she does not use drugs.    Post Discharge Medication Reconciliation Status: discharge medications reconciled and changed, per note/orders (see AVS)    Current Outpatient Medications   Medication Sig Dispense Refill     acetaminophen (TYLENOL)  500 MG tablet Take 1,000 mg by mouth 3 times daily       ASPIRIN LOW DOSE 81 MG chewable tablet CHEW AND SWALLOW ONE TABLET BY MOUTH ONCE DAILY 30 tablet 11     atorvastatin (LIPITOR) 40 MG tablet TAKE 1 TABLET BY MOUTH ONCE DAILY 30 tablet 98     Blood Glucose Monitoring Suppl CORY 2 times daily Call nurse for further directions if blood glucose is less than 80 or greater than 250       BREO ELLIPTA 100-25 MCG/INH inhaler INHALE 1 PUFF BY MOUTH ONCE DAILY 1 Inhaler 11     calcium carbonate (TUMS) 500 MG chewable tablet Take 1 chew tab by mouth every hour as needed for heartburn       cefuroxime (CEFTIN) 500 MG tablet Take 500 mg by mouth every 12 hours 20 tablet 0     DONEPEZIL HCL PO Take 5 mg by mouth At Bedtime        DULoxetine (CYMBALTA) 30 MG capsule Take 2 capsules (60 mg) by mouth daily AND 1 capsule (30 mg) At Bedtime. 90 capsule 11     furosemide (LASIX) 40 MG tablet Take 1 tablet (40 mg) by mouth 2 times daily 60 tablet 11     gabapentin (NEURONTIN) 100 MG capsule Take 2 capsules (200 mg) by mouth 2 times daily 120 capsule 11     Hypromellose (NATURAL BALANCE TEARS OP) Place 1 drop into both eyes every 6 hours as needed (to both eyes)        levalbuterol (XOPENEX) 1.25 MG/3ML neb solution Take 1 ampule by nebulization every 4 hours as needed for shortness of breath / dyspnea or wheezing And every 4 hours PRN       Lidocaine (LIDOCARE) 4 % Patch Place 1 patch onto the skin daily as needed To desired area (shoulder or hip). On for 12hrs, off for 12hrs       lisinopril (PRINIVIL/ZESTRIL) 20 MG tablet Take 1 tablet (20 mg) by mouth 2 times daily 62 tablet 98     MAGNESIUM OXIDE PO Take 400 mg by mouth every other day       Menthol, Topical Analgesic, (BIOFREEZE) 4 % GEL Externally apply topically 4 times daily as needed To left shoulder and right hip       NIFEdipine ER (ADALAT CC) 30 MG 24 hr tablet Take 30 mg by mouth daily       nystatin (MYCOSTATIN) 461555 UNIT/GM external powder APPLY TOPICALLY TO  "ABDOMEN FOLDS, GROIN, AND BREASTS TWICE DAILY AS NEEDED 60 g 97     OMEPRAZOLE PO Take 40 mg by mouth every morning       ondansetron (ZOFRAN) 4 MG tablet Take 4 mg by mouth every 6 hours as needed for nausea       oxyCODONE (OXYCONTIN) 10 MG 12 hr tablet Take 1 tablet (10 mg) by mouth every 12 hours maximum 2 tablet(s) per day 60 tablet 0     oxyCODONE (ROXICODONE) 5 MG tablet Take 1 tablet (5 mg) by mouth daily as needed for severe pain 30 tablet 0     phenazopyridine (PYRIDIUM) 100 MG tablet Take 1 tablet (100 mg) by mouth 3 times daily as needed for urinary tract discomfort 9 tablet 1     senna-docusate (SENOKOT-S/PERICOLACE) 8.6-50 MG tablet Take 1 tablet by mouth 2 times daily as needed for constipation        tiZANidine (ZANAFLEX) 2 MG tablet TAKE 1 TABLET BY MOUTH THREE TIMES DAILY AS NEEDED 12 tablet 0     vitamin D3 (CHOLECALCIFEROL) 1000 units (25 mcg) tablet Take 1 tablet (1,000 Units) by mouth daily 60 tablet 11     metroNIDAZOLE (FLAGYL) 250 MG tablet Take 250 mg by mouth 2 times daily 20 tablet      ROS:  4 point ROS including Respiratory, CV, GI and , other than that noted in the HPI,  is negative    Vitals:  /68   Pulse 80   Temp 98  F (36.7  C)   Resp 18   Ht 1.6 m (5' 3\")   Wt 87.1 kg (192 lb)   SpO2 96%   BMI 34.01 kg/m    Exam:  GENERAL APPEARANCE:  Alert, in no acute distress   HEAD:  Normal, normocephalic, atraumatic  ENT:  Mouth and posterior oropharynx normal, moist mucous membranes, hearing acuity - within normal limits , neck with walnut sized firm painful mass on L submandibular area  EYE EXAM: normal external eye, conjunctiva, lids, PAPI  CHEST/RESP:  respiratory effort normal, lung sounds CTA , no respiratory distress  CV:  Rate reg, rhythm reg, no murmur, 2+ peripheral edema-not wearing compriflex garments  M/S:   extremities normal, gait abnormal-walking only a few steps with therapy   ABD/Groin: firm, tender to touch mass in R groin/top of thigh which she reports is " bigger today than normal   NEUROLOGIC EXAM: Normal gross motor movement, tone and coordination. No tremor. Cranial nerves 2-12 are normal tested and grossly at patient's baseline  PSYCH:  Alert and oriented to person-place-time , affect pleasant      Lab/Diagnostic data:  Recent labs in [facility] reviewed by me today.     ASSESSMENT/PLAN:  Sialadenitis  Completing dual antibiotics therapy with cefuroxime and metronidazole-to complete a 10 day course (done about 9/16/19).  Still with firm tender mass L neck, foul taste in her mouth  -lemon drops for foul taste in mouth  -soft foods diet for comfort  -complete antibiotics  -ENT referral on 9/13/19 planned  Future Appointments   Date Time Provider Department Center   9/13/2019  9:30 AM Ole Mendoza MD [facility]     Chronic diastolic congestive heart failure (H)  On lasix at baseline and uses compriflex garments for bilateral LE edema.  No significant dyspnea, cough, congestion different from baseline.  She does not have these garments with her, but can resume them upon her return to [facility]. stable    CKD (chronic kidney disease) stage 3, GFR 30-59 ml/min (H)  Baseline creat 1.1-1.3.  Now at 1.15, stable.    -Avoid nephrotoxic medications  -Renal dosing of medications  -monitor kidney function routinely      Cerebrovascular accident (CVA), unspecified mechanism (H)  Mild cognitive impairment  Per history, on donepezil at baseline with no changes.     DDD (degenerative disc disease), lumbar  Chronic pain syndrome  Stable on oxycontin and oxycodone prn. Rxs provided for transition to TCU    Essential Hypertension  Patient on lisinopril and isradipine at baseline.  During her stay at [facility], the pharmacy was unable to obtain the isradipine so nifedipine XL 30 mg daily substituted.  -consider resumption of isradipine at 5 mg BID    -magnesium oxide 250 mg daily also not available from pharmacy at [facility], so magnesium oxide 400 mg every other day was  substituted.  May benefit from lab draw to determine ongoing needs for magnesium supplement.     -noted upon review of records that pyridium and Vit D was discontinued in a home assisted living facility visit several months ago, but was resumed upon her hospital admission, would recommend re-evaluation by PCP.        Orders written by provider at facility and transcribed by : Kaity Perry CMA  1.  Discontinue the following due to non-use:   MOM   Dulc. Supp.  2.  Diagnosis clarified on OSR.  3.  Discontinue BID blood sugar checks-no evidence of DM2 on record.  4.  Patient may discharge to [facility] for ongoing rehab/skilled services.  5.  Follow-up with in-house team at next appt.  6.  Written Rx's provided for Oxycodone 5 mg 1 tab po every day prn pain.  #30. And OxyContin ER 10 mg 1 tab po q 12 h.  Dx:  Chronic pain.  #30.      Total time spent with patient visit at the skilled nursing facility was 35 min including patient visit and review of past records. Greater than 50% of total time spent with counseling and coordinating care due to coordinating care with nurse on admission orders and coordinating care with follow up labs and appointments.    Electronically signed by:  MARY Tejada CNP

## 2019-09-11 NOTE — PROGRESS NOTES
9-11-19   CC [facility] and CC also sent OBRA I to  per their request.      CC then talked with RN on staff as SW was out and CC was told [facility].     CC did left Cleo GILLESPIE, Laura DUMONT and Sarah MATTHEWS know this and CC will send email to  at the TCU as well   Mirian Weldon MA [facility] Care Coordinator   [phone]

## 2019-09-12 ENCOUNTER — PATIENT OUTREACH (OUTPATIENT)
Dept: GERIATRIC MEDICINE | Facility: CLINIC | Age: 84
End: 2019-09-12

## 2019-09-12 NOTE — PROGRESS NOTES
[facility]  PRIMARY CARE PROVIDER AND CLINIC:  MARY Gómez CNP, [address] [address] / [facility] [address]  Chief Complaint   Patient presents with     Establish Care     [place] [facility] Record Number:  [id number]  Place of Service where encounter took place:  [facility] ([facility]) [[id number]]    Jazmin Clifton  is a 86 year old  (12/30/1932), admitted to the above facility from  [facility]. Hospital stay 9/9/2019 through 9/10/2019..  Admitted to this facility for  rehab, medical management and nursing care.    HPI:    HPI information obtained from: facility chart records, facility staff, patient report and [facility] chart review.   Brief Summary of Hospital Course: Yajaira Clifton is an 86-year-old female with past medical history of CHF, COPD, CKD, CVA, GERD, HTN, anemia, spinal stenosis with multiple lumbar surgeries, chronic pain syndrome with chronic use of narcotics, and osteoarthritis who presented to the emergency department on 9/1/2019 with complaints of malaise, nausea and vomiting, and left jaw swelling.  CT showed submandibular  that were sialadenitis, though no obstruction or blockage from stones.  She was started on course of Flagyl and cefuroxime.  Gland became smaller and less tender.  She was discharged to TCU on 9/6.  On 9/9 she presented with acute increase the left hand swelling so was sent to emergency department due to concerns for CVA.  Negative for DVT head CT negative, and she was discharged back to TCU.  However [facility] to complete her rehab on 9/12/2019.  Isradipine was changed to nifedipine XL at last facility as unable to get isradipine from pharmacy.   Updates on Status Since Skilled nursing Admission:   She was seen by ENT this a.m.  We recommended discontinuing the Flagyl and continuing on this  cefuroxime for 14 additional days.  She ran says the  swelling in her left jaw is much better, no real tenderness with palpation, but continues to have some swelling.  Deepti did have diarrhea while in the hospital, but states is been slowly improving.  RAJENDRA does have difficulty chewing.  She is missing multiple teeth, is awaiting tooth extraction on 10/11/2019.  She has been continuing with hot packs and sucking on lemon candies to promote salivation.    Yajaira states appetite is not back to normal, but is improving.  Has not had a bowel movement yet today, denies any abdominal pain nausea or bloating.    Denies any chest pain or shortness of breath.  Only ambulates very short distances at baseline.  Has chronic shoulder hip and back pain, does follow with the chronic pain clinic.    CODE STATUS/ADVANCE DIRECTIVES DISCUSSION:   DNR / DNI  Patient's living condition: lives in an assisted living facility  ALLERGIES: Accupril; Ace inhibitors; Augmentin; Blood-group specific substance; Levofloxacin; Morphine; Nitrofurantoin; Norvasc [amlodipine besylate]; and Quinapril  PAST MEDICAL HISTORY:  has a past medical history of ABDOMINAL PAIN GENERALIZED (3/15/2006), Abdominal pain, generalized (3/15/2006), Atherosclerosis of renal artery (H), BENIGN HYPERTENSION (5/1/2006), Benign neoplasm of scalp and skin of neck, Cerebral aneurysm, nonruptured, Congestive heart failure (H), Depressive disorder, not elsewhere classified, Esophageal reflux, Female stress incontinence, Gastrointestinal malfunction arising from mental factors, Generalized osteoarthrosis, unspecified site, Herpes zoster dermatitis of eyelid, Insomnia, unspecified, Lumbago, Other chest pain, Other specified cardiac dysrhythmias(427.89), Rectocele, Unspecified disorder of kidney and ureter, and Unspecified essential hypertension.  PAST SURGICAL HISTORY:   has a past surgical history that includes surgical history of - ; surgical history of - ; surgical history of -  (1996); surgical history of - ; surgical history of - ;  cholecystectomy, laporoscopic (1997); hysterectomy, mirtha (1982); Endarterectomy carotid (Right, 8/31/2017); Cystoscopy, Biopsy Urethra (N/A, 6/10/2019); and Urethroplasty (N/A, 6/10/2019).  FAMILY HISTORY: family history includes Asthma in her son; Cancer in her brother and mother; Cerebrovascular Disease in her father; Diabetes in her son; Eye Disorder in her son; Gastrointestinal Disease in her son; Heart Disease in her father.  SOCIAL HISTORY:   reports that she quit smoking about 27 years ago. She has never used smokeless tobacco. She reports that she drinks alcohol. She reports that she does not use drugs.    Post Discharge Medication Reconciliation Status: discharge medications reconciled and changed, per note/orders (see AVS)    Current Outpatient Medications   Medication Sig Dispense Refill     acetaminophen (TYLENOL) 500 MG tablet Take 1,000 mg by mouth 3 times daily       ASPIRIN LOW DOSE 81 MG chewable tablet CHEW AND SWALLOW ONE TABLET BY MOUTH ONCE DAILY 30 tablet 11     atorvastatin (LIPITOR) 40 MG tablet TAKE 1 TABLET BY MOUTH ONCE DAILY 30 tablet 98     bisacodyl (DULCOLAX) 10 MG suppository Place 10 mg rectally daily as needed for constipation       Blood Glucose Monitoring Suppl CORY 2 times daily Call nurse for further directions if blood glucose is less than 80 or greater than 250       BREO ELLIPTA 100-25 MCG/INH inhaler INHALE 1 PUFF BY MOUTH ONCE DAILY 1 Inhaler 11     calcium carbonate (TUMS) 500 MG chewable tablet Take 1 chew tab by mouth every hour as needed for heartburn       DONEPEZIL HCL PO Take 5 mg by mouth At Bedtime        DULoxetine (CYMBALTA) 30 MG capsule Take 2 capsules (60 mg) by mouth daily AND 1 capsule (30 mg) At Bedtime. 90 capsule 11     furosemide (LASIX) 40 MG tablet Take 1 tablet (40 mg) by mouth 2 times daily 60 tablet 11     gabapentin (NEURONTIN) 100 MG capsule Take 2 capsules (200 mg) by mouth 2 times daily 120 capsule 11     Hypromellose (NATURAL BALANCE TEARS OP)  Place 1 drop into both eyes every 6 hours as needed (to both eyes)        levalbuterol (XOPENEX) 1.25 MG/3ML neb solution Take 1 ampule by nebulization every 4 hours as needed for shortness of breath / dyspnea or wheezing And every 4 hours PRN       Lidocaine (LIDOCARE) 4 % Patch Place 1 patch onto the skin daily as needed To desired area (shoulder or hip). On for 12hrs, off for 12hrs       lisinopril (PRINIVIL/ZESTRIL) 20 MG tablet Take 1 tablet (20 mg) by mouth 2 times daily 62 tablet 98     magnesium hydroxide (MILK OF MAGNESIA) 400 MG/5ML suspension Take 30 mLs by mouth daily as needed for constipation or heartburn       MAGNESIUM OXIDE PO Take 400 mg by mouth every other day       Menthol, Topical Analgesic, (BIOFREEZE) 4 % GEL Externally apply topically 4 times daily as needed To left shoulder and right hip       metroNIDAZOLE (FLAGYL) 250 MG tablet Take 250 mg by mouth 2 times daily 20 tablet      NIFEdipine ER (ADALAT CC) 30 MG 24 hr tablet Take 30 mg by mouth daily       nystatin (MYCOSTATIN) 766993 UNIT/GM external powder APPLY TOPICALLY TO ABDOMEN FOLDS, GROIN, AND BREASTS TWICE DAILY AS NEEDED 60 g 97     OMEPRAZOLE PO Take 40 mg by mouth every morning       ondansetron (ZOFRAN) 4 MG tablet Take 4 mg by mouth every 6 hours as needed for nausea       oxyCODONE (OXYCONTIN) 10 MG 12 hr tablet Take 1 tablet (10 mg) by mouth every 12 hours maximum 2 tablet(s) per day 60 tablet 0     oxyCODONE (ROXICODONE) 5 MG tablet Take 1 tablet (5 mg) by mouth daily as needed for severe pain 30 tablet 0     phenazopyridine (PYRIDIUM) 100 MG tablet Take 1 tablet (100 mg) by mouth 3 times daily as needed for urinary tract discomfort 9 tablet 1     senna-docusate (SENOKOT-S/PERICOLACE) 8.6-50 MG tablet Take 1 tablet by mouth 2 times daily as needed for constipation        tiZANidine (ZANAFLEX) 2 MG tablet TAKE 1 TABLET BY MOUTH THREE TIMES DAILY AS NEEDED 12 tablet 0     vitamin D3 (CHOLECALCIFEROL) 1000 units (25 mcg) tablet  "Take 1 tablet (1,000 Units) by mouth daily 60 tablet 11     cefuroxime (CEFTIN) 500 MG tablet Take 1 tablet (500 mg) by mouth every 12 hours for 14 days 28 tablet 0       ROS:  10 point ROS of systems including Constitutional, Eyes, Respiratory, Cardiovascular, Gastroenterology, Genitourinary, Integumentary, Musculoskeletal, Psychiatric were all negative except for pertinent positives noted in my HPI.    Vitals:  BP (!) 162/87   Pulse 77   Temp 98.1  F (36.7  C)   Resp 20   Ht 1.588 m (5' 2.5\")   Wt 87.7 kg (193 lb 6.4 oz)   SpO2 94%   BMI 34.81 kg/m    Exam:  GENERAL APPEARANCE:  Alert, in no distress, oriented, cooperative  ENT:  Mouth and posterior oropharynx normal, moist mucous membranes, poor dentition, Palpable submandibular gland to left jaw, nontender.  RESP:  respiratory effort and palpation of chest normal, lungs clear to auscultation , no respiratory distress  CV:  Palpation and auscultation of heart done , regular rate and rhythm, no murmur, rub, or gallop, +2 pedal pulses, peripheral edema 2+ in BLE,   ABDOMEN:  no guarding or rebound, bowel sounds normal, soft, non-tender  M/S:   Decreased range of motion to right hip and left shoulder, and a very small steps, slightly unsteady gait.   SKIN:  Inspection of skin and subcutaneous tissue baseline, Seroma palpable to right thigh/groin  NEURO:   Cranial nerves 2-12 are normal tested and grossly at patient's baseline  PSYCH:  oriented X 3, normal insight, judgement and memory, affect and mood normal    Lab/Diagnostic data:  Recent labs in [facility] reviewed by me today.     ASSESSMENT/PLAN:  (K11.20) Sialadenitis  (primary encounter diagnosis)  Comment: Likely bacterial infection has no obstruction or stones noted on imaging.  Seen by ENT today.  Plan: Flagyl discontinued, continue cefuroxime x14 additional days.  Continue lemon candies to increase salivation and hot packs to site.  If continues to have swollen gland after completion of antibiotics update " ENT.    (I50.32) Chronic diastolic congestive heart failure (H)  (I10) Essential hypertension  Comment: Appears compensated currently.  Plan: Continue aspirin 81 mg daily, Lipitor 40 mg daily, furosemide 40 mg twice daily, lisinopril 20 mg twice daily, nifedipine Er 30 mg daily (though if difficulty with control will transition back to isradipine).  Daily weights.  Low-salt diet.  Staff to bring up to apartment so can collect compression garments for treatment of seroma and lower extremity edema.    (N18.3) CKD (chronic kidney disease) stage 3, GFR 30-59 ml/min (H)  Comment: Stable during recent hospitalization; baseline creatinine 1-1.3  Plan: Avoid nephrotoxic medications, BMP to monitor for stability    (G89.4) Chronic pain syndrome  (M16.11) Osteoarthritis of right hip, unspecified osteoarthritis type  (M48.061) Spinal stenosis of lumbar region, unspecified whether neurogenic claudication present  Comment: Pain at baseline controlled currently.  Follows with pain clinic.    Plan: Oxycodone 5 mg daily as needed. Continue OxyContin 10 mg twice daily, benzoyl 4 times daily as needed, lidocaine patch as needed to shoulder or hip.  Tylenol 1000 mg 3 times daily    (N18.4,  D63.1) Anemia of chronic renal failure, stage 4 (severe) (H)  Comment: Iron supplement discontinued earlier this year, hemoglobin remained stable  Plan: CBC next week to monitor for stability.    (J44.9) Chronic obstructive pulmonary disease, unspecified COPD type (H)  Comment: No signs or symptoms of exacerbation currently  Plan: Continue Deidre Ellipta 1 puff daily, levo albuterol nebs as needed for shortness of breath.  Continue to monitor and adjust    Physical deconditioning  Plan: PT and OT to evaluate and treat.  Limited mobility at baseline due to chronic pain and arthritis.    Diarrhea  Comment: Suspect due to recent illness and antibiotic use  Plan: We will discontinue magnesium supplement as could be contributing, monitor.  Encourage  adequate fluid intake.    transcribed by : DINA Lewis  1. Discontinue Magnesium supplement  2. Discontinue Vitamin D pyridium  3. Mg,BMP, CBC on 9/18 - Dx - CKD, sialoadenitis     Total time spent with patient visit at the skilled nursing facility was 45 min including patient visit and review of past records. Greater than 50% of total time spent with counseling and coordinating care due to coordinating care with nurse on admission orders, coordinating care with follow up labs and appointments and counseling patient/resident for 25 minutes on: the reason for their hospitalization and what the treatment is, the plan of SNF stay and projected length of stay, current medications (treatments) reconciled from the hospital, recent past lab and imaging results and subsequent treatment plan and current pain control plan and controlled substances ordered and taper plan of care.    Electronically signed by:  MARY Gómez CNP     Disclaimer:  This note consists of symbols derived from keyboarding, dictation, and/or voice recognition software.  As a result, there may be errors in the script that have gone undetected.  Please consider this when interpreting information found in the chart

## 2019-09-12 NOTE — PROGRESS NOTES
9-12-19   CC was [facility]   CC did email both SW [facility] and RN staff at [facility] to confirm   CC will then f/u as needed.   Mirian Weldon MA [facility] Care Coordinator   [phone]

## 2019-09-13 ENCOUNTER — OFFICE VISIT (OUTPATIENT)
Dept: OTOLARYNGOLOGY | Facility: CLINIC | Age: 84
End: 2019-09-13
Payer: COMMERCIAL

## 2019-09-13 ENCOUNTER — NURSING HOME VISIT (OUTPATIENT)
Dept: GERIATRICS | Facility: CLINIC | Age: 84
End: 2019-09-13
Payer: COMMERCIAL

## 2019-09-13 VITALS
TEMPERATURE: 97.6 F | DIASTOLIC BLOOD PRESSURE: 74 MMHG | HEART RATE: 77 BPM | RESPIRATION RATE: 18 BRPM | SYSTOLIC BLOOD PRESSURE: 149 MMHG

## 2019-09-13 VITALS
DIASTOLIC BLOOD PRESSURE: 87 MMHG | RESPIRATION RATE: 20 BRPM | HEIGHT: 63 IN | OXYGEN SATURATION: 94 % | HEART RATE: 77 BPM | SYSTOLIC BLOOD PRESSURE: 162 MMHG | TEMPERATURE: 98.1 F | WEIGHT: 193.4 LBS | BODY MASS INDEX: 34.27 KG/M2

## 2019-09-13 DIAGNOSIS — M16.11 OSTEOARTHRITIS OF RIGHT HIP, UNSPECIFIED OSTEOARTHRITIS TYPE: ICD-10-CM

## 2019-09-13 DIAGNOSIS — K11.20 SIALADENITIS: ICD-10-CM

## 2019-09-13 DIAGNOSIS — G89.4 CHRONIC PAIN SYNDROME: ICD-10-CM

## 2019-09-13 DIAGNOSIS — K11.20 SIALOADENITIS OF SUBMANDIBULAR GLAND: Primary | ICD-10-CM

## 2019-09-13 DIAGNOSIS — R19.7 DIARRHEA, UNSPECIFIED TYPE: ICD-10-CM

## 2019-09-13 DIAGNOSIS — D63.1 ANEMIA OF CHRONIC RENAL FAILURE, STAGE 4 (SEVERE) (H): ICD-10-CM

## 2019-09-13 DIAGNOSIS — M48.061 SPINAL STENOSIS OF LUMBAR REGION, UNSPECIFIED WHETHER NEUROGENIC CLAUDICATION PRESENT: ICD-10-CM

## 2019-09-13 DIAGNOSIS — J44.9 CHRONIC OBSTRUCTIVE PULMONARY DISEASE, UNSPECIFIED COPD TYPE (H): ICD-10-CM

## 2019-09-13 DIAGNOSIS — I10 ESSENTIAL HYPERTENSION: ICD-10-CM

## 2019-09-13 DIAGNOSIS — I50.32 CHRONIC DIASTOLIC CONGESTIVE HEART FAILURE (H): ICD-10-CM

## 2019-09-13 DIAGNOSIS — K11.20 SIALADENITIS: Primary | ICD-10-CM

## 2019-09-13 DIAGNOSIS — N18.30 CKD (CHRONIC KIDNEY DISEASE) STAGE 3, GFR 30-59 ML/MIN (H): ICD-10-CM

## 2019-09-13 DIAGNOSIS — N18.4 ANEMIA OF CHRONIC RENAL FAILURE, STAGE 4 (SEVERE) (H): ICD-10-CM

## 2019-09-13 PROCEDURE — 99207 ZZC CDG-CODE INCORRECT PER BILLING BASED ON TIME: CPT | Performed by: NURSE PRACTITIONER

## 2019-09-13 PROCEDURE — 99309 SBSQ NF CARE MODERATE MDM 30: CPT | Performed by: NURSE PRACTITIONER

## 2019-09-13 PROCEDURE — 99203 OFFICE O/P NEW LOW 30 MIN: CPT | Performed by: OTOLARYNGOLOGY

## 2019-09-13 RX ORDER — BISACODYL 10 MG
10 SUPPOSITORY, RECTAL RECTAL DAILY PRN
Status: ON HOLD | COMMUNITY
End: 2019-01-01

## 2019-09-13 RX ORDER — CEFUROXIME AXETIL 500 MG/1
500 TABLET ORAL EVERY 12 HOURS
Qty: 28 TABLET | Refills: 0 | Status: SHIPPED | OUTPATIENT
Start: 2019-09-13 | End: 2019-10-08

## 2019-09-13 ASSESSMENT — MIFFLIN-ST. JEOR: SCORE: 1278.45

## 2019-09-13 NOTE — PATIENT INSTRUCTIONS
Per physician instructions.    If you have questions or concerns on any instructions given to you by your provider today or if you need to schedule an appointment, you can reach us at [phone].    Thank you!

## 2019-09-13 NOTE — PROGRESS NOTES
History of Present Illness - Jazmin Clifton is a 86 year old female here to see me for the first time at the consult of Shamar Orellana for submadibular gland swelling.    She tells me that this started 3 weeks ago when she started to have a cough and then there was some swelling in the LEFT neck. She went to the [facility] ER and was placed on some antibiotics.  The antibiotics have helped.  She does still have some swelling in that area and a stuffy feeling in the LEFT ear.  She has had no surgery on the ENT as an adult otherwise.    A neck CT was done on 9/1/19 that was personally reviewed for this exam. There is enlargement of the LEFT gland and calcifications consistent with small stones.  But the duct seems clear. No other masses or abnormalities seen in the upper aerodigestive tract or deep neck spaces.    Past Medical History -   Patient Active Problem List   Diagnosis     Atherosclerosis of renal artery (H)     Esophageal reflux     Cerebral aneurysm, nonruptured     Other specified cardiac dysrhythmias(427.89)     Essential hypertension, benign     Congenital cystic kidney disease     Benign neoplasm of colon     Renovascular hypertension     CHF (congestive heart failure) (H)     Restrictive lung disease     CARDIOVASCULAR SCREENING; LDL GOAL LESS THAN 100     Osteoporosis     S/P laminectomy     Hip joint replacement status     Osteoarthritis     DDD (degenerative disc disease), lumbar     Mild major depression (H)     Incontinence of urine     Chronic obstructive pulmonary disease, unspecified COPD type (H)     Generalized muscle weakness     Dizziness     Osteoarthritis of right hip, unspecified osteoarthritis type     CVA (cerebral vascular accident) (H)     Transient cerebral ischemia, unspecified type     CKD (chronic kidney disease) stage 3, GFR 30-59 ml/min (H)     Thyroid nodule     Trigger point of extremity     Trochanteric bursitis of right hip     Carotid stenosis, symptomatic w/o infarct, right      S/P carotid endarterectomy     Mild cognitive impairment     Morbid obesity (H)     Health Care Home     Shortness of breath     COPD exacerbation (H)     Weakness     Acute kidney injury (H)     Alzheimer's dementia without behavioral disturbance     Anaclitic depression     BPPV (benign paroxysmal positional vertigo)     Chronic diastolic congestive heart failure (H)     Constipation     Dyspepsia     Female stress incontinence     Hip pain     History of bradycardia     History of DVT (deep vein thrombosis)     History of herpes zoster     History of intracranial aneurysm     History of stroke     Hyperlipidemia     Impaired ambulation     Multiple closed fractures of metatarsal bone     Normocytic anemia     Postherpetic neuralgia     Rectocele     Retention of urine     Right groin mass     Spinal stenosis of lumbar region     Spondylolisthesis, lumbar region     Umbilical hernia without obstruction and without gangrene     Anemia of chronic renal failure, stage 4 (severe) (H)     Mass of urethra     Gross hematuria     Post-operative state     Sialadenitis       Current Medications -   Current Outpatient Medications:      acetaminophen (TYLENOL) 500 MG tablet, Take 1,000 mg by mouth 3 times daily, Disp: , Rfl:      ASPIRIN LOW DOSE 81 MG chewable tablet, CHEW AND SWALLOW ONE TABLET BY MOUTH ONCE DAILY, Disp: 30 tablet, Rfl: 11     atorvastatin (LIPITOR) 40 MG tablet, TAKE 1 TABLET BY MOUTH ONCE DAILY, Disp: 30 tablet, Rfl: 98     bisacodyl (DULCOLAX) 10 MG suppository, Place 10 mg rectally daily as needed for constipation, Disp: , Rfl:      Blood Glucose Monitoring Suppl CORY, 2 times daily Call nurse for further directions if blood glucose is less than 80 or greater than 250, Disp: , Rfl:      BREO ELLIPTA 100-25 MCG/INH inhaler, INHALE 1 PUFF BY MOUTH ONCE DAILY, Disp: 1 Inhaler, Rfl: 11     calcium carbonate (TUMS) 500 MG chewable tablet, Take 1 chew tab by mouth every hour as needed for heartburn, Disp: ,  Rfl:      cefuroxime (CEFTIN) 500 MG tablet, Take 500 mg by mouth every 12 hours, Disp: 20 tablet, Rfl: 0     DONEPEZIL HCL PO, Take 5 mg by mouth At Bedtime , Disp: , Rfl:      DULoxetine (CYMBALTA) 30 MG capsule, Take 2 capsules (60 mg) by mouth daily AND 1 capsule (30 mg) At Bedtime., Disp: 90 capsule, Rfl: 11     furosemide (LASIX) 40 MG tablet, Take 1 tablet (40 mg) by mouth 2 times daily, Disp: 60 tablet, Rfl: 11     gabapentin (NEURONTIN) 100 MG capsule, Take 2 capsules (200 mg) by mouth 2 times daily, Disp: 120 capsule, Rfl: 11     Hypromellose (NATURAL BALANCE TEARS OP), Place 1 drop into both eyes every 6 hours as needed (to both eyes) , Disp: , Rfl:      levalbuterol (XOPENEX) 1.25 MG/3ML neb solution, Take 1 ampule by nebulization every 4 hours as needed for shortness of breath / dyspnea or wheezing And every 4 hours PRN, Disp: , Rfl:      Lidocaine (LIDOCARE) 4 % Patch, Place 1 patch onto the skin daily as needed To desired area (shoulder or hip). On for 12hrs, off for 12hrs, Disp: , Rfl:      lisinopril (PRINIVIL/ZESTRIL) 20 MG tablet, Take 1 tablet (20 mg) by mouth 2 times daily, Disp: 62 tablet, Rfl: 98     magnesium hydroxide (MILK OF MAGNESIA) 400 MG/5ML suspension, Take 30 mLs by mouth daily as needed for constipation or heartburn, Disp: , Rfl:      MAGNESIUM OXIDE PO, Take 400 mg by mouth every other day, Disp: , Rfl:      Menthol, Topical Analgesic, (BIOFREEZE) 4 % GEL, Externally apply topically 4 times daily as needed To left shoulder and right hip, Disp: , Rfl:      metroNIDAZOLE (FLAGYL) 250 MG tablet, Take 250 mg by mouth 2 times daily, Disp: 20 tablet, Rfl:      NIFEdipine ER (ADALAT CC) 30 MG 24 hr tablet, Take 30 mg by mouth daily, Disp: , Rfl:      nystatin (MYCOSTATIN) 849570 UNIT/GM external powder, APPLY TOPICALLY TO ABDOMEN FOLDS, GROIN, AND BREASTS TWICE DAILY AS NEEDED, Disp: 60 g, Rfl: 97     OMEPRAZOLE PO, Take 40 mg by mouth every morning, Disp: , Rfl:      ondansetron (ZOFRAN)  4 MG tablet, Take 4 mg by mouth every 6 hours as needed for nausea, Disp: , Rfl:      oxyCODONE (OXYCONTIN) 10 MG 12 hr tablet, Take 1 tablet (10 mg) by mouth every 12 hours maximum 2 tablet(s) per day, Disp: 60 tablet, Rfl: 0     oxyCODONE (ROXICODONE) 5 MG tablet, Take 1 tablet (5 mg) by mouth daily as needed for severe pain, Disp: 30 tablet, Rfl: 0     phenazopyridine (PYRIDIUM) 100 MG tablet, Take 1 tablet (100 mg) by mouth 3 times daily as needed for urinary tract discomfort, Disp: 9 tablet, Rfl: 1     senna-docusate (SENOKOT-S/PERICOLACE) 8.6-50 MG tablet, Take 1 tablet by mouth 2 times daily as needed for constipation , Disp: , Rfl:      tiZANidine (ZANAFLEX) 2 MG tablet, TAKE 1 TABLET BY MOUTH THREE TIMES DAILY AS NEEDED, Disp: 12 tablet, Rfl: 0     vitamin D3 (CHOLECALCIFEROL) 1000 units (25 mcg) tablet, Take 1 tablet (1,000 Units) by mouth daily, Disp: 60 tablet, Rfl: 11    Allergies -   Allergies   Allergen Reactions     Accupril      Ace Inhibitors Unknown     Accupril     Augmentin Unknown     Blood-Group Specific Substance Other (See Comments)     Patient has a Non-specific antibody. Blood Product orders may be delayed.  Draw one red top and two purple top tubes for ALL Type and Screen/ Type and Crossmatch orders.      Levofloxacin Other (See Comments) and Muscle Pain (Myalgia)     Pt prescribed ciprofloxacin in Jan 2018 (no rxn documented)         Morphine Other (See Comments)     hallucinations     Nitrofurantoin Nausea and Vomiting     Norvasc [Amlodipine Besylate] Swelling     Leg swelling       Quinapril Other (See Comments) and Unknown     Hypertension. 3.29.18 - Takes and tolerates lisinopril           Social History -   Social History     Socioeconomic History     Marital status:      Spouse name: Not on file     Number of children: Not on file     Years of education: Not on file     Highest education level: Not on file   Occupational History     Not on file   Social Needs     Financial  resource strain: Not on file     Food insecurity:     Worry: Not on file     Inability: Not on file     Transportation needs:     Medical: Not on file     Non-medical: Not on file   Tobacco Use     Smoking status: Former Smoker     Last attempt to quit: 1992     Years since quittin.7     Smokeless tobacco: Never Used   Substance and Sexual Activity     Alcohol use: Yes     Comment: wine occas.     Drug use: No     Sexual activity: Never   Lifestyle     Physical activity:     Days per week: Not on file     Minutes per session: Not on file     Stress: Not on file   Relationships     Social connections:     Talks on phone: Not on file     Gets together: Not on file     Attends [employer] service: Not on file     Active member of club or organization: Not on file     Attends meetings of clubs or organizations: Not on file     Relationship status: Not on file     Intimate partner violence:     Fear of current or ex partner: Not on file     Emotionally abused: Not on file     Physically abused: Not on file     Forced sexual activity: Not on file   Other Topics Concern     Not on file   Social History Narrative     Not on file       Family History -   Family History   Problem Relation Age of Onset     Cancer Mother         stomache     Cerebrovascular Disease Father      Heart Disease Father      Cancer Brother         lymphoma     Eye Disorder Son      Asthma Son      Diabetes Son      Gastrointestinal Disease Son         no control over bladder or bowels     C.A.D. No family hx of      Hypertension No family hx of      Breast Cancer No family hx of      Cancer - colorectal No family hx of      Prostate Cancer No family hx of        Review of Systems - As per HPI and PMHx, otherwise 10+ system review of the head and neck, and general constitution is negative.    Physical Exam  BP (!) 149/74 (BP Location: Right arm, Patient Position: Chair, Cuff Size: Adult Regular)   Pulse 77   Temp 97.6  F (36.4  C) (Tympanic)    Resp 18     General - The patient is well nourished and well developed, and appears to have good nutritional status.  Alert and oriented to person and place, answers questions and cooperates with examination appropriately.   Head and Face - Normocephalic and atraumatic, with no gross asymmetry noted of the contour of the facial features.  The facial nerve is intact, with strong symmetric movements.  Voice and Breathing - The patient was breathing comfortably without the use of accessory muscles. There was no wheezing, stridor, or stertor.  The patients voice was clear and strong, and had appropriate pitch and quality.  Ears - The tympanic membranes are normal in appearance, bony landmarks are intact.  No retraction, perforation, or masses.  No fluid or purulence was seen in the external canal or the middle ear. No evidence of infection of the middle ear or external canal, cerumen was normal in appearance.  Eyes - Extraocular movements intact, and the pupils were reactive to light.  Sclera were not icteric or injected, conjunctiva were pink and moist.  Mouth - Examination of the oral cavity showed pink, healthy oral mucosa. No lesions or ulcerations noted.  The tongue was mobile and midline, and the dentition were in good condition.    Throat - The walls of the oropharynx were smooth, pink, moist, symmetric, and had no lesions or ulcerations.  The tonsillar pillars and soft palate were symmetric.  The uvula was midline on elevation.    Neck - Normal midline excursion of the laryngotracheal complex during swallowing.  Full range of motion on passive movement.  Palpation of the occipital, submental, submandibular, internal jugular chain, and supraclavicular nodes did not demonstrate any abnormal lymph nodes or masses.  The LEFT submandibular gland is definitely still large and slightly firm and tender.  But no fluctuance. The carotid pulse was palpable bilaterally.  Palpation of the thyroid was soft and smooth, with  "no nodules or goiter appreciated.  The trachea was mobile and midline.  Nose - External contour is symmetric, no gross deflection or scars.  Nasal mucosa is pink and moist with no abnormal mucus.  The septum was midline and non-obstructive, turbinates of normal size and position.  No polyps, masses, or purulence noted on examination.      A/P - Jazmin Clifton is a 86 year old female  (K11.20) Sialoadenitis of submandibular gland  (primary encounter diagnosis)  (K11.20) Sialadenitis    The patient has a chronic submandibular sialoadenitis.  I have counseled her that based on imaging that there is no mass or stone, so likely this is either bacterial, or possibly due to sludging or stasis.  I would recommend hydration and sialogogues.  I have renewd her Cefuroxime to extend another 14 days.    I have counseled her that if this continues to recur, a submandibular gland removal might be needed.  She tells me in no uncertain terms, \"I will never have surgery again.\"        "

## 2019-09-13 NOTE — NURSING NOTE
"Chief Complaint   Patient presents with     Consult For     Sialoadenitis of submandibular gland       Initial BP (!) 149/74 (BP Location: Right arm, Patient Position: Chair, Cuff Size: Adult Regular)   Pulse 77   Temp 97.6  F (36.4  C) (Tympanic)   Resp 18  Estimated body mass index is 34.81 kg/m  as calculated from the following:    Height as of an earlier encounter on 9/13/19: 1.588 m (5' 2.5\").    Weight as of an earlier encounter on 9/13/19: 87.7 kg (193 lb 6.4 oz).  BP completed using cuff size: regular   Medications and allergies reviewed.      Tatyana STAHL CMA     "

## 2019-09-13 NOTE — LETTER
9/13/2019         RE: Jazmin Clifton  [address]  [facility] [address]        Dear Colleague,    Thank you for referring your patient, Jazmin Clifton, to the [facility]. Please see a copy of my visit note below.    History of Present Illness - Jazmin Clifton is a 86 year old female here to see me for the first time at the consult of Shamar Orellana for submadibular gland swelling.    She tells me that this started 3 weeks ago when she started to have a cough and then there was some swelling in the LEFT neck. She went to the [facility] ER and was placed on some antibiotics.  The antibiotics have helped.  She does still have some swelling in that area and a stuffy feeling in the LEFT ear.  She has had no surgery on the ENT as an adult otherwise.    A neck CT was done on 9/1/19 that was personally reviewed for this exam. There is enlargement of the LEFT gland and calcifications consistent with small stones.  But the duct seems clear. No other masses or abnormalities seen in the upper aerodigestive tract or deep neck spaces.    Past Medical History -   Patient Active Problem List   Diagnosis     Atherosclerosis of renal artery (H)     Esophageal reflux     Cerebral aneurysm, nonruptured     Other specified cardiac dysrhythmias(427.89)     Essential hypertension, benign     Congenital cystic kidney disease     Benign neoplasm of colon     Renovascular hypertension     CHF (congestive heart failure) (H)     Restrictive lung disease     CARDIOVASCULAR SCREENING; LDL GOAL LESS THAN 100     Osteoporosis     S/P laminectomy     Hip joint replacement status     Osteoarthritis     DDD (degenerative disc disease), lumbar     Mild major depression (H)     Incontinence of urine     Chronic obstructive pulmonary disease, unspecified COPD type (H)     Generalized muscle weakness     Dizziness     Osteoarthritis of right hip, unspecified osteoarthritis type     CVA (cerebral vascular accident) (H)     Transient  cerebral ischemia, unspecified type     CKD (chronic kidney disease) stage 3, GFR 30-59 ml/min (H)     Thyroid nodule     Trigger point of extremity     Trochanteric bursitis of right hip     Carotid stenosis, symptomatic w/o infarct, right     S/P carotid endarterectomy     Mild cognitive impairment     Morbid obesity (H)     Health Care Home     Shortness of breath     COPD exacerbation (H)     Weakness     Acute kidney injury (H)     Alzheimer's dementia without behavioral disturbance     Anaclitic depression     BPPV (benign paroxysmal positional vertigo)     Chronic diastolic congestive heart failure (H)     Constipation     Dyspepsia     Female stress incontinence     Hip pain     History of bradycardia     History of DVT (deep vein thrombosis)     History of herpes zoster     History of intracranial aneurysm     History of stroke     Hyperlipidemia     Impaired ambulation     Multiple closed fractures of metatarsal bone     Normocytic anemia     Postherpetic neuralgia     Rectocele     Retention of urine     Right groin mass     Spinal stenosis of lumbar region     Spondylolisthesis, lumbar region     Umbilical hernia without obstruction and without gangrene     Anemia of chronic renal failure, stage 4 (severe) (H)     Mass of urethra     Gross hematuria     Post-operative state     Sialadenitis       Current Medications -   Current Outpatient Medications:      acetaminophen (TYLENOL) 500 MG tablet, Take 1,000 mg by mouth 3 times daily, Disp: , Rfl:      ASPIRIN LOW DOSE 81 MG chewable tablet, CHEW AND SWALLOW ONE TABLET BY MOUTH ONCE DAILY, Disp: 30 tablet, Rfl: 11     atorvastatin (LIPITOR) 40 MG tablet, TAKE 1 TABLET BY MOUTH ONCE DAILY, Disp: 30 tablet, Rfl: 98     bisacodyl (DULCOLAX) 10 MG suppository, Place 10 mg rectally daily as needed for constipation, Disp: , Rfl:      Blood Glucose Monitoring Suppl CORY, 2 times daily Call nurse for further directions if blood glucose is less than 80 or greater  than 250, Disp: , Rfl:      BREO ELLIPTA 100-25 MCG/INH inhaler, INHALE 1 PUFF BY MOUTH ONCE DAILY, Disp: 1 Inhaler, Rfl: 11     calcium carbonate (TUMS) 500 MG chewable tablet, Take 1 chew tab by mouth every hour as needed for heartburn, Disp: , Rfl:      cefuroxime (CEFTIN) 500 MG tablet, Take 500 mg by mouth every 12 hours, Disp: 20 tablet, Rfl: 0     DONEPEZIL HCL PO, Take 5 mg by mouth At Bedtime , Disp: , Rfl:      DULoxetine (CYMBALTA) 30 MG capsule, Take 2 capsules (60 mg) by mouth daily AND 1 capsule (30 mg) At Bedtime., Disp: 90 capsule, Rfl: 11     furosemide (LASIX) 40 MG tablet, Take 1 tablet (40 mg) by mouth 2 times daily, Disp: 60 tablet, Rfl: 11     gabapentin (NEURONTIN) 100 MG capsule, Take 2 capsules (200 mg) by mouth 2 times daily, Disp: 120 capsule, Rfl: 11     Hypromellose (NATURAL BALANCE TEARS OP), Place 1 drop into both eyes every 6 hours as needed (to both eyes) , Disp: , Rfl:      levalbuterol (XOPENEX) 1.25 MG/3ML neb solution, Take 1 ampule by nebulization every 4 hours as needed for shortness of breath / dyspnea or wheezing And every 4 hours PRN, Disp: , Rfl:      Lidocaine (LIDOCARE) 4 % Patch, Place 1 patch onto the skin daily as needed To desired area (shoulder or hip). On for 12hrs, off for 12hrs, Disp: , Rfl:      lisinopril (PRINIVIL/ZESTRIL) 20 MG tablet, Take 1 tablet (20 mg) by mouth 2 times daily, Disp: 62 tablet, Rfl: 98     magnesium hydroxide (MILK OF MAGNESIA) 400 MG/5ML suspension, Take 30 mLs by mouth daily as needed for constipation or heartburn, Disp: , Rfl:      MAGNESIUM OXIDE PO, Take 400 mg by mouth every other day, Disp: , Rfl:      Menthol, Topical Analgesic, (BIOFREEZE) 4 % GEL, Externally apply topically 4 times daily as needed To left shoulder and right hip, Disp: , Rfl:      metroNIDAZOLE (FLAGYL) 250 MG tablet, Take 250 mg by mouth 2 times daily, Disp: 20 tablet, Rfl:      NIFEdipine ER (ADALAT CC) 30 MG 24 hr tablet, Take 30 mg by mouth daily, Disp: , Rfl:       nystatin (MYCOSTATIN) 445729 UNIT/GM external powder, APPLY TOPICALLY TO ABDOMEN FOLDS, GROIN, AND BREASTS TWICE DAILY AS NEEDED, Disp: 60 g, Rfl: 97     OMEPRAZOLE PO, Take 40 mg by mouth every morning, Disp: , Rfl:      ondansetron (ZOFRAN) 4 MG tablet, Take 4 mg by mouth every 6 hours as needed for nausea, Disp: , Rfl:      oxyCODONE (OXYCONTIN) 10 MG 12 hr tablet, Take 1 tablet (10 mg) by mouth every 12 hours maximum 2 tablet(s) per day, Disp: 60 tablet, Rfl: 0     oxyCODONE (ROXICODONE) 5 MG tablet, Take 1 tablet (5 mg) by mouth daily as needed for severe pain, Disp: 30 tablet, Rfl: 0     phenazopyridine (PYRIDIUM) 100 MG tablet, Take 1 tablet (100 mg) by mouth 3 times daily as needed for urinary tract discomfort, Disp: 9 tablet, Rfl: 1     senna-docusate (SENOKOT-S/PERICOLACE) 8.6-50 MG tablet, Take 1 tablet by mouth 2 times daily as needed for constipation , Disp: , Rfl:      tiZANidine (ZANAFLEX) 2 MG tablet, TAKE 1 TABLET BY MOUTH THREE TIMES DAILY AS NEEDED, Disp: 12 tablet, Rfl: 0     vitamin D3 (CHOLECALCIFEROL) 1000 units (25 mcg) tablet, Take 1 tablet (1,000 Units) by mouth daily, Disp: 60 tablet, Rfl: 11    Allergies -   Allergies   Allergen Reactions     Accupril      Ace Inhibitors Unknown     Accupril     Augmentin Unknown     Blood-Group Specific Substance Other (See Comments)     Patient has a Non-specific antibody. Blood Product orders may be delayed.  Draw one red top and two purple top tubes for ALL Type and Screen/ Type and Crossmatch orders.      Levofloxacin Other (See Comments) and Muscle Pain (Myalgia)     Pt prescribed ciprofloxacin in Jan 2018 (no rxn documented)         Morphine Other (See Comments)     hallucinations     Nitrofurantoin Nausea and Vomiting     Norvasc [Amlodipine Besylate] Swelling     Leg swelling       Quinapril Other (See Comments) and Unknown     Hypertension. 3.29.18 - Takes and tolerates lisinopril           Social History -   Social History      Socioeconomic History     Marital status:      Spouse name: Not on file     Number of children: Not on file     Years of education: Not on file     Highest education level: Not on file   Occupational History     Not on file   Social Needs     Financial resource strain: Not on file     Food insecurity:     Worry: Not on file     Inability: Not on file     Transportation needs:     Medical: Not on file     Non-medical: Not on file   Tobacco Use     Smoking status: Former Smoker     Last attempt to quit: 1992     Years since quittin.7     Smokeless tobacco: Never Used   Substance and Sexual Activity     Alcohol use: Yes     Comment: wine occas.     Drug use: No     Sexual activity: Never   Lifestyle     Physical activity:     Days per week: Not on file     Minutes per session: Not on file     Stress: Not on file   Relationships     Social connections:     Talks on phone: Not on file     Gets together: Not on file     Attends Restorationism service: Not on file     Active member of club or organization: Not on file     Attends meetings of clubs or organizations: Not on file     Relationship status: Not on file     Intimate partner violence:     Fear of current or ex partner: Not on file     Emotionally abused: Not on file     Physically abused: Not on file     Forced sexual activity: Not on file   Other Topics Concern     Not on file   Social History Narrative     Not on file       Family History -   Family History   Problem Relation Age of Onset     Cancer Mother         stomache     Cerebrovascular Disease Father      Heart Disease Father      Cancer Brother         lymphoma     Eye Disorder Son      Asthma Son      Diabetes Son      Gastrointestinal Disease Son         no control over bladder or bowels     C.A.D. No family hx of      Hypertension No family hx of      Breast Cancer No family hx of      Cancer - colorectal No family hx of      Prostate Cancer No family hx of        Review of Systems - As  per HPI and PMHx, otherwise 10+ system review of the head and neck, and general constitution is negative.    Physical Exam  B/P: Data Unavailable, T: Data Unavailable, P: Data Unavailable, R: Data Unavailable  Vitals: There were no vitals taken for this visit.  BMI= There is no height or weight on file to calculate BMI.    General - The patient is well nourished and well developed, and appears to have good nutritional status.  Alert and oriented to person and place, answers questions and cooperates with examination appropriately.   Head and Face - Normocephalic and atraumatic, with no gross asymmetry noted of the contour of the facial features.  The facial nerve is intact, with strong symmetric movements.  Voice and Breathing - The patient was breathing comfortably without the use of accessory muscles. There was no wheezing, stridor, or stertor.  The patients voice was clear and strong, and had appropriate pitch and quality.  Ears - The tympanic membranes are normal in appearance, bony landmarks are intact.  No retraction, perforation, or masses.  No fluid or purulence was seen in the external canal or the middle ear. No evidence of infection of the middle ear or external canal, cerumen was normal in appearance.  Eyes - Extraocular movements intact, and the pupils were reactive to light.  Sclera were not icteric or injected, conjunctiva were pink and moist.  Mouth - Examination of the oral cavity showed pink, healthy oral mucosa. No lesions or ulcerations noted.  The tongue was mobile and midline, and the dentition were in good condition.    Throat - The walls of the oropharynx were smooth, pink, moist, symmetric, and had no lesions or ulcerations.  The tonsillar pillars and soft palate were symmetric.  The uvula was midline on elevation.    Neck - Normal midline excursion of the laryngotracheal complex during swallowing.  Full range of motion on passive movement.  Palpation of the occipital, submental,  submandibular, internal jugular chain, and supraclavicular nodes did not demonstrate any abnormal lymph nodes or masses.  The carotid pulse was palpable bilaterally.  Palpation of the thyroid was soft and smooth, with no nodules or goiter appreciated.  The trachea was mobile and midline.  Nose - External contour is symmetric, no gross deflection or scars.  Nasal mucosa is pink and moist with no abnormal mucus.  The septum was midline and non-obstructive, turbinates of normal size and position.  No polyps, masses, or purulence noted on examination.      A/P - Jazmin Clifton is a 86 year old female    The patient has a chronic submandibular sialoadenitis.  I have counseled her that based on imaging that there is no mass or stone, so likely this is either bacterial, or possibly due to sludging or stasis.  I would recommend hydration and sialogogues.  Also, a low dose prolonged 21 days course of Amoxicillin is also reasonable.  But at this point she seems to pretty much be better, so observation is also reasonable.        Again, thank you for allowing me to participate in the care of your patient.        Sincerely,        Ole Mendoza MD

## 2019-09-16 VITALS
BODY MASS INDEX: 34.52 KG/M2 | TEMPERATURE: 98.5 F | SYSTOLIC BLOOD PRESSURE: 113 MMHG | WEIGHT: 194.8 LBS | HEART RATE: 80 BPM | HEIGHT: 63 IN | DIASTOLIC BLOOD PRESSURE: 60 MMHG | RESPIRATION RATE: 18 BRPM | OXYGEN SATURATION: 90 %

## 2019-09-16 ASSESSMENT — MIFFLIN-ST. JEOR: SCORE: 1292.74

## 2019-09-17 ENCOUNTER — DISCHARGE SUMMARY NURSING HOME (OUTPATIENT)
Dept: GERIATRICS | Facility: CLINIC | Age: 84
End: 2019-09-17
Payer: COMMERCIAL

## 2019-09-17 DIAGNOSIS — K11.20 SIALADENITIS: Primary | ICD-10-CM

## 2019-09-17 DIAGNOSIS — K59.01 SLOW TRANSIT CONSTIPATION: ICD-10-CM

## 2019-09-17 DIAGNOSIS — J44.9 CHRONIC OBSTRUCTIVE PULMONARY DISEASE, UNSPECIFIED COPD TYPE (H): ICD-10-CM

## 2019-09-17 DIAGNOSIS — I63.9 CEREBROVASCULAR ACCIDENT (CVA), UNSPECIFIED MECHANISM (H): ICD-10-CM

## 2019-09-17 DIAGNOSIS — F41.8 DEPRESSION WITH ANXIETY: ICD-10-CM

## 2019-09-17 DIAGNOSIS — F03.90 DEMENTIA WITHOUT BEHAVIORAL DISTURBANCE, UNSPECIFIED DEMENTIA TYPE: ICD-10-CM

## 2019-09-17 DIAGNOSIS — M81.0 OSTEOPOROSIS WITHOUT CURRENT PATHOLOGICAL FRACTURE, UNSPECIFIED OSTEOPOROSIS TYPE: ICD-10-CM

## 2019-09-17 DIAGNOSIS — G89.4 CHRONIC PAIN SYNDROME: ICD-10-CM

## 2019-09-17 DIAGNOSIS — K21.9 GASTROESOPHAGEAL REFLUX DISEASE WITHOUT ESOPHAGITIS: ICD-10-CM

## 2019-09-17 DIAGNOSIS — E78.5 HYPERLIPIDEMIA, UNSPECIFIED HYPERLIPIDEMIA TYPE: ICD-10-CM

## 2019-09-17 DIAGNOSIS — I10 ESSENTIAL HYPERTENSION: ICD-10-CM

## 2019-09-17 DIAGNOSIS — R53.81 PHYSICAL DECONDITIONING: ICD-10-CM

## 2019-09-17 DIAGNOSIS — I25.10 CORONARY ARTERY DISEASE INVOLVING NATIVE CORONARY ARTERY OF NATIVE HEART WITHOUT ANGINA PECTORIS: ICD-10-CM

## 2019-09-17 DIAGNOSIS — M96.843 POSTOPERATIVE SEROMA OF MUSCULOSKELETAL STRUCTURE AFTER NON-MUSCULOSKELETAL PROCEDURE: ICD-10-CM

## 2019-09-17 PROCEDURE — 99306 1ST NF CARE HIGH MDM 50: CPT | Mod: AI | Performed by: INTERNAL MEDICINE

## 2019-09-17 NOTE — PROGRESS NOTES
[facility]  INITIAL VISIT NOTE  September 17, 2019    PRIMARY CARE PROVIDER AND CLINIC:  Junie Estrella [address] [address] / [facility] [id number]    Chief Complaint   Patient presents with     Hospital F/U       HPI:    Jazmin Clifton is a 86 year old  (12/30/1932) female who was seen at [facility] on September 17, 2019 for an initial visit. Medical history is notable for CAD, HTN, COPD, CVA, R groin seroma and lumbar spinal stenosis s/p L1-L2 bilateral laminectomy and medial facetectomy (1/9/19).  She was hospitalized at [facility] from 9/1/1910 9/6/2019 where she presented with weakness malaise and left jaw swelling.  She was found to have a left sialadenitis and was treated with cefuroxime and metronidazole.  She was admitted to this facility for medical management and rehab.     She was then seen in the [facility] ER on 9/9/2019 with concern for CVA due to left arm swelling and left-sided facial droop.  Work-up without any acute findings.    She was seen in ENT clinic on 9/13/2019, where this sialadenitis was felt to be chronic.  Recommendation was for hydration, sialagogues and to continue cefuroxime for an additional 14 days.    Today, Ms. Clifton is seen in her room after OT.  Her main concern today is the right groin seroma.  She feels that it is acutely worse due to working with physical therapy.  It is giving her a significant amount of pain in her right groin.  She states she is unable to sit or lay as she normally would due to this pain.  At home, she will use heat.  No numbness or tingling in her right leg.  We discussed using heat at the TCU.  We also discussed massage therapy, and she stated that [facility] had previously been very helpful in managing the seroma.  Jazmin is scheduled to go home this weekend, and she would like [facility] to come back and see her when she is home.  She has had an extensive evaluation for the seroma, and  "recommendation from the surgeon is for compression undergarments, but she wears them inconsistently.  Today she tells me that she will not wear them because they are uncomfortable.  No chest pain or dyspnea.  No abdominal pain.  No diarrhea or constipation.  She notes the swelling in her left jaw is almost resolved.  No pain in her left jaw.  She has lemon drops and butterscotch drops in her room as recommended by ENT.  No acute concerns today per discussion with nursing.  She is working with therapies.  Plan is for discharge back to her assisted living on 9/20/19.    CODE STATUS:   DNR / DNI    ALLERGIES:     Allergies   Allergen Reactions     Accupril      Ace Inhibitors Unknown     Accupril     Augmentin Unknown     Blood-Group Specific Substance Other (See Comments)     Patient has a Non-specific antibody. Blood Product orders may be delayed.  Draw one red top and two purple top tubes for ALL Type and Screen/ Type and Crossmatch orders.      Levofloxacin Other (See Comments) and Muscle Pain (Myalgia)     Pt prescribed ciprofloxacin in Jan 2018 (no rxn documented)         Morphine Other (See Comments)     hallucinations     Nitrofurantoin Nausea and Vomiting     Norvasc [Amlodipine Besylate] Swelling     Leg swelling       Quinapril Other (See Comments) and Unknown     Hypertension. 3.29.18 - Takes and tolerates lisinopril           PAST MEDICAL HISTORY:   Past Medical History:   Diagnosis Date     ABDOMINAL PAIN GENERALIZED 3/15/2006    1 month of abdominal pain that bryn radiates into her back and chest.  Pt was hospitilzed 2x for the pain at [facility] --pt hopsitized for 3 days.  Rcords not ab=vailable.  She was told either \" twisted intestine or blockage of bowel.  Pt feels it is the hiatal hernia.  Pt hospitilized again a second time  A month ago.  Ct scans x 2 showed \" nothing.\"  Pt had a barium and xrays.  Pt sa     Abdominal pain, generalized 3/15/2006    1 month of abdominal pain that " "llqwhich radiates into her back and chest.  Pt was hospitilzed 2x for the pain at [facility] --pt hopsitized for 3 days.  Rcords not ab=vailable.  She was told either \" twisted intestine or blockage of bowel.  Pt feels it is the hiatal hernia.  Pt hospitilized again a second time  A month ago.  Ct scans x 2 showed \" nothing.\"  Pt had a barium and xrays.  Pt sa     Atherosclerosis of renal artery (H)     Left renal artery stenosis     BENIGN HYPERTENSION 5/1/2006 5/1/2006   Increase catapres to 0.3 mg and decrease clonidine to 0.1 mg daily.  Recheck bp in 1 month.      Benign neoplasm of scalp and skin of neck     Seborrheic keratosis     Cerebral aneurysm, nonruptured     Cerebral Aneurysm     Congestive heart failure (H)      Depressive disorder, not elsewhere classified     Depression     Esophageal reflux     Gastroesophageal Reflux Disease     Female stress incontinence      Gastrointestinal malfunction arising from mental factors     Dyspepsia     Generalized osteoarthrosis, unspecified site     DJD-chronic back pain     Herpes zoster dermatitis of eyelid      Insomnia, unspecified      Lumbago     Chronic back pain     Other chest pain     Atypical Chest Pain     Other specified cardiac dysrhythmias(427.89)     Bradycardia, improved on lower dose beta blockers      Rectocele     Grade 3     Unspecified disorder of kidney and ureter     Renal insufficiency     Unspecified essential hypertension        PAST SURGICAL HISTORY:   Past Surgical History:   Procedure Laterality Date     CHOLECYSTECTOMY, LAPOROSCOPIC  1997    Cholecystectomy, Laparoscopic     CYSTOSCOPY, BIOPSY URETHRA N/A 6/10/2019    Procedure: Cystoscopy With Biopsy, Removal Of Urethral Lesion;  Surgeon: Yesy Fong MD;  Location: [facility] OR     ENDARTERECTOMY CAROTID Right 8/31/2017    Procedure: ENDARTERECTOMY CAROTID;  RIGHT CAROTID ENDARTERECTOMY WITH EEG;  Surgeon: Hilario Parry MD;  Location:  OR     HYSTERECTOMY, RAYMOND "  1982    oophorectomy,RAYMOND     SURGICAL HISTORY OF -       Laminectomy x 3     SURGICAL HISTORY OF -       MCA Aneurysm repair     SURGICAL HISTORY OF -   1996    Bladder suspension (Bladder Repair x2)     SURGICAL HISTORY OF -       Bilateral Hand Surgeries for Arthritis x2     SURGICAL HISTORY OF -       Repair of a Cerebral Aneurysm     URETHROPLASTY N/A 6/10/2019    Procedure: Urethral Reconstruction;  Surgeon: Yesy Fong MD;  Location: [facility] OR       FAMILY HISTORY:   Family History   Problem Relation Age of Onset     Cancer Mother         stomache     Cerebrovascular Disease Father      Heart Disease Father      Cancer Brother         lymphoma     Eye Disorder Son      Asthma Son      Diabetes Son      Gastrointestinal Disease Son         no control over bladder or bowels     C.A.D. No family hx of      Hypertension No family hx of      Breast Cancer No family hx of      Cancer - colorectal No family hx of      Prostate Cancer No family hx of        SOCIAL HISTORY:   Lives in AL facility    MEDICATIONS:  Current Outpatient Medications   Medication Sig Dispense Refill     acetaminophen (TYLENOL) 500 MG tablet Take 1,000 mg by mouth 3 times daily       ASPIRIN LOW DOSE 81 MG chewable tablet CHEW AND SWALLOW ONE TABLET BY MOUTH ONCE DAILY 30 tablet 11     atorvastatin (LIPITOR) 40 MG tablet TAKE 1 TABLET BY MOUTH ONCE DAILY 30 tablet 98     bisacodyl (DULCOLAX) 10 MG suppository Place 10 mg rectally daily as needed for constipation       BREO ELLIPTA 100-25 MCG/INH inhaler INHALE 1 PUFF BY MOUTH ONCE DAILY 1 Inhaler 11     calcium carbonate (TUMS) 500 MG chewable tablet Take 1 chew tab by mouth every hour as needed for heartburn       cefuroxime (CEFTIN) 500 MG tablet Take 1 tablet (500 mg) by mouth every 12 hours for 14 days 28 tablet 0     DONEPEZIL HCL PO Take 5 mg by mouth At Bedtime        DULoxetine (CYMBALTA) 30 MG capsule Take 2 capsules (60 mg) by mouth daily AND 1 capsule (30 mg) At Bedtime. 90  capsule 11     furosemide (LASIX) 40 MG tablet Take 1 tablet (40 mg) by mouth 2 times daily 60 tablet 11     gabapentin (NEURONTIN) 100 MG capsule Take 2 capsules (200 mg) by mouth 2 times daily 120 capsule 11     Hypromellose (NATURAL BALANCE TEARS OP) Place 1 drop into both eyes every 6 hours as needed (to both eyes)        levalbuterol (XOPENEX) 1.25 MG/3ML neb solution Take 1 ampule by nebulization every 4 hours as needed for shortness of breath / dyspnea or wheezing And every 4 hours PRN       Lidocaine (LIDOCARE) 4 % Patch Place 1 patch onto the skin daily as needed To desired area (shoulder or hip). On for 12hrs, off for 12hrs       lisinopril (PRINIVIL/ZESTRIL) 20 MG tablet Take 1 tablet (20 mg) by mouth 2 times daily 62 tablet 98     magnesium hydroxide (MILK OF MAGNESIA) 400 MG/5ML suspension Take 30 mLs by mouth daily as needed for constipation or heartburn       Menthol, Topical Analgesic, (BIOFREEZE) 4 % GEL Externally apply topically 4 times daily as needed To left shoulder and right hip       NIFEdipine ER (ADALAT CC) 30 MG 24 hr tablet Take 30 mg by mouth daily       nystatin (MYCOSTATIN) 805421 UNIT/GM external powder APPLY TOPICALLY TO ABDOMEN FOLDS, GROIN, AND BREASTS TWICE DAILY AS NEEDED 60 g 97     OMEPRAZOLE PO Take 40 mg by mouth every morning       ondansetron (ZOFRAN) 4 MG tablet Take 4 mg by mouth every 6 hours as needed for nausea       oxyCODONE (OXYCONTIN) 10 MG 12 hr tablet Take 1 tablet (10 mg) by mouth every 12 hours maximum 2 tablet(s) per day 60 tablet 0     oxyCODONE (ROXICODONE) 5 MG tablet Take 1 tablet (5 mg) by mouth daily as needed for severe pain 30 tablet 0     senna-docusate (SENOKOT-S/PERICOLACE) 8.6-50 MG tablet Take 1 tablet by mouth 2 times daily as needed for constipation        tiZANidine (ZANAFLEX) 2 MG tablet TAKE 1 TABLET BY MOUTH THREE TIMES DAILY AS NEEDED 12 tablet 0     Blood Glucose Monitoring Suppl CORY 2 times daily Call nurse for further directions if  "blood glucose is less than 80 or greater than 250         ROS:  7 point ROS neg other than the symptoms noted above in the HPI.    PHYSICAL EXAM:  /60   Pulse 80   Temp 98.5  F (36.9  C)   Resp 18   Ht 1.6 m (5' 3\")   Wt 88.4 kg (194 lb 12.8 oz)   SpO2 90%   BMI 34.51 kg/m     Gen: sitting in recliner, alert, cooperative and in no acute distress  HEENT: normocephalic; oropharynx clear  Card: RRR, S1, S2, no murmurs  Resp: lungs clear to auscultation bilaterally  GI: abdomen soft, not-tender  MSK: normal muscle tone, dependent LE edema  Neuro: CX II-XII grossly in tact; ROM in all four extremities grossly in tact  Psych: alert and oriented to self and general situation; normal affect    LABORATORY/IMAGING DATA:  Reviewed as per Epic    ASSESSMENT/PLAN:    Left Submandibular Sialadenitis  She was evaluated by ENT on September 13, and this is felt to be more chronic in nature.  Today, no appreciable swelling in left submandibular area.  No tenderness to palpation.  She does have lemon drops and butterscotch candies in her room per recommendation of ENT  -- discussed with her ENT recommendation for hydration and sialagogues and she voiced understanding of the importance of this  -- discussed ongoing course of antibiotics per ENT recommendation - she will continue on cefuroxime 500 mg twice daily through 9/27/19    Seroma  Acute on chronic. This goes back almost two years.  She is been evaluated by surgery, with etiology most likely related to lymph node involvement after removal of a right groin mass.  Recommendation is been that she wear a compression garment, but she is inconsistent in doing so.  Again today, she tells me that she does not like to wear the compression undergarment.  She does note that prior lymphedema therapy with home care was helpful in reducing the size of the seroma, and she would like to try that again upon discharge.  Underlying chronic pain syndrome complicates the management of " this.  Underlying chronic pain syndrome complicates the management of this.  -- we will place referral for home PT/OT ([facility] with  homecare) - discussed with SW who will follow up with home care to ensure they understand our request  -- discussed the importance of compression, and wearing compression undergarments; however, Jazmin continues to prefer not to wear them as they are uncomfortable    CAD / CHF / HTN / HLD  SBPs 110s-140s, most 130s  -- continues on ASA 81 mg daily, atorvastatin 40 mg qhs, furosemide 40 mg BID, nifedipine 20 mg daily and lisinopril 20 mg daily   -- follow BPs, weights, clinical volume status    COPD   No signs of exacerbation.    -- continues on fluticasone-vilanterol 100-25 mcg daily and levalbuterol nebs PRN     Dementia Without Behavioral Disturbance  Lives in AL facility. Today was a good historian. CPT 4.7/5.6 during June 2018 TCU stay.   -- continues on donepezil 5 mg daily      Depression / Anxiety  Mood and spirits good today  -- continues on duloxetine 60 mg qam and 30 mg at bedtime   -- ongoing supportive cares     CVA  By history.   -- continues on ASA 81 mg daily and atorvastatin 40 mg qhs     Chronic Pain Syndrome   Lumbar Spinal Stenosis s/p L1 L2 Bilateral Laminectomy and Medial Facetectomy (1/9/19)  Today, pain is involving the R groin seroma.   -- analgesia with APAP 1000 mg TID, duloxetine 60 mg qam and 30 mg at bedtime, gabapentin 200mg BID, lidoderm patch, OxyContin 10 mg q12, oxycodone 5 mg daily PRN and tizanidine 2 mg TID PRN  -- she has followed in pain clinic - follow up with them as scheduled    Osteoporosis  Previously on alendronate.  -- continues on calcium supplement      GERD  -- continues on omeprazole 40 mg daily     Slow Transit Constipation  -- continues on Senna-S 1 tab BID PRN  -- adjust bowel regimen as needed     Physical Deconditioning  In setting of hospitalization and underlying medical conditions. Supervision/limited assist of one with bed  mobility and transfers and is an assist of one with bathing and dressing.  -- ongoing PT/OT  -- plan is for discharge back to her AL apartment on 9/20 - discussed with BONITA re: home lymphedema therapist for the seroma      Total unit/floor time of 47 minutes consisted of the following: examination of patient, reviewing the record including pertinent labs and imaging. More than 50% of this time (25 min) was spent in coordination of care with nursing staff and other healthcare providers. This time was spent on discussing the care plan including management of sialadenitis and seroma, chronic pain syndrome, dementia, discharge to AL apartment and specialty follow up.     Electronically signed by:  Ana Cristina Yu MD        Documentation of Face to Face and Certification for Home Health Services    I certify that patient, Jazmin Clifton is under my care and that I, or a Nurse Practitioner or Physician's Assistant working with me, had a face-to-face encounter that meets the physician face-to-face encounter requirements with this patient on: 9/17/2019.    This encounter with the patient was in whole, or in part, for the following medical condition, which is the primalry reason for Home Health Care: LE edema, R ervin seroma.    I certify that, based on my findings, the following services are medically necessary Home Health Services: Occupational Therapy and Physical Therapy    My clinical findings support the need for the above services because: Occupational Therapy Services are needed to assess and treat cognitive ability and address ADL safety due to impairment in lymphedema eval and treatment, including R groin seroma. and Physical Therapy Services are needed to assess and treat the following functional impairments: lymphedema eval and treatment, including R groin seroma    Further, I certify that my clinical findings support that this patient is homebound (i.e. absences from home require considerable and taxing effort and  are for medical reasons or Taoist services or infrequently or of short duration when for other reasons) because: unable to leave home without assistance    Based on the above findings, I certify that this patient is confined to the home and needs intermittent skilled nursing care, physical therapy and/or speech therapy.  The patient is under my care, and I have initiated the establishment of the plan of care.  This patient will be followed by a physician who will periodically review the plan of care.    Physician/Provider to provide follow up care: Junie Estrella    [facility] certified Physician at time of discharge: Ana Cristina Yu MD  Electronically signed by: Ana Cristina Yu MD

## 2019-09-18 ENCOUNTER — HOSPITAL LABORATORY (OUTPATIENT)
Facility: OTHER | Age: 84
End: 2019-09-18

## 2019-09-18 ENCOUNTER — DOCUMENTATION ONLY (OUTPATIENT)
Dept: OTHER | Facility: CLINIC | Age: 84
End: 2019-09-18

## 2019-09-18 ENCOUNTER — AMBULATORY - HEALTHEAST (OUTPATIENT)
Dept: OTHER | Facility: CLINIC | Age: 84
End: 2019-09-18

## 2019-09-18 LAB
ANION GAP SERPL CALCULATED.3IONS-SCNC: 9 MMOL/L (ref 3–14)
BUN SERPL-MCNC: 26 MG/DL (ref 7–30)
CALCIUM SERPL-MCNC: 9.9 MG/DL (ref 8.5–10.1)
CHLORIDE SERPL-SCNC: 104 MMOL/L (ref 94–109)
CO2 SERPL-SCNC: 29 MMOL/L (ref 20–32)
CREAT SERPL-MCNC: 1.01 MG/DL (ref 0.52–1.04)
ERYTHROCYTE [DISTWIDTH] IN BLOOD BY AUTOMATED COUNT: 13.5 % (ref 10–15)
GFR SERPL CREATININE-BSD FRML MDRD: 50 ML/MIN/{1.73_M2}
GLUCOSE SERPL-MCNC: 110 MG/DL (ref 70–99)
HCT VFR BLD AUTO: 41 % (ref 35–47)
HGB BLD-MCNC: 12.4 G/DL (ref 11.7–15.7)
MAGNESIUM SERPL-MCNC: 1.9 MG/DL (ref 1.6–2.3)
MCH RBC QN AUTO: 29.7 PG (ref 26.5–33)
MCHC RBC AUTO-ENTMCNC: 30.2 G/DL (ref 31.5–36.5)
MCV RBC AUTO: 98 FL (ref 78–100)
PLATELET # BLD AUTO: 272 10E9/L (ref 150–450)
POTASSIUM SERPL-SCNC: 3.9 MMOL/L (ref 3.4–5.3)
RBC # BLD AUTO: 4.17 10E12/L (ref 3.8–5.2)
SODIUM SERPL-SCNC: 142 MMOL/L (ref 133–144)
WBC # BLD AUTO: 8.3 10E9/L (ref 4–11)

## 2019-09-18 NOTE — PROGRESS NOTES
9-18-19   CC received email from SW at the TCU that plan is for member to go back to the AL this coming Friday with PT/OT thru [facility].   CC did email RN office at AL to confirm and asked for update as needed.   Mirian Weldon MA [facility] Care Coordinator   [phone]            TRANSITIONS OF CARE (AJAY) LOG   AJAY tasks should be completed by the CC within one (1) business day of notification of each transition. Follow up contact with member is required after return to their usual care setting.  Note:  If CC finds out about the transitions fifteen (15) days or more after the member has returned to their usual care setting, no AJAY log is needed. However, the CC should check in with the member to discuss the transition process, any changes needed to the care plan and document it in a case note.    Member Name:  Jazmin STAHL [facility] Name:  [employer] Member ID#: [id number]   Product: [facility] Care Coordinator Contact:  Mirian Weldon MA, [facility] Agency/County/Care System: [facility]   Transition Communication Actions from Care Management Contact   Transition #1   Notification Date: 9-18-19 Transition Date:   9-20-19 Transition From: Skilled Nursing Facility, [facility]     Is this the member s usual care setting?               no Transition To: Assisted Living, [facility]    Transition Type:  Planned  Reason for Admission/Comments:  Member is discharge back to her AL apartment      Shared CC contact info, care plan/services with receiving setting--Date completed: 9-18-19    Notified PCP of transition--Date completed:  9-20-19     via  EMR   Transition #2   Transition #3  (if applicable)   Notification Date:          Transition To:    Transition Date:      Transition Type:      Notified PCP -- Date completed:               Shared CC contact info, care plan/services with receiving setting or, if applicable, home care agency--Date completed:    *Complete additional  tasks below, if this transition is a return to usual care setting.      Comments:      Notification Date:          Transition To:    Transition Date:              Transition Type:      Notified PCP--Date completed:          Shared CC contact info, care plan/services with receiving setting or, if applicable, home care agency--Date completed:       *Complete additional tasks below, if this transition is a return to usual care setting.      Comments:       *Complete tasks below when the member is discharging TO their usual care setting within one (1) business day of notification.  For situations where the Care Coordinator is notified of the discharge prior to the date of discharge, the Care Coordinator must follow up with the member or designated representative to confirm that discharge actually occurred and discuss required AJAY tasks as outlined in the AJAY Instructions.  (This includes situations where it may be a  new  usual care setting for the member. (i.e., a community member who decides upon permanent nursing home placement following hospitalization and rehab).    Date completed: 9-18-19  Communicated with member or their designated representative about the following:  care transition process; about changes to the member s health status; plan of care updates; education about transitions and how to prevent unplanned transitions/readmissions  Four Pillars for Optimal Transition:    Check  Yes  - if the member, family member and/or SNF/facility staff manages the following:    If  No  provide explanation in the comments section.          [x]  Yes     []  No     Does the member have a follow-up appointment scheduled with primary care or specialist? (Mental health hospitalizations--the appt. should be w/in 7 days)   [x]  Yes     []  No     Can the member manage their medications or is there a system in place to manage medications (e.g. home care set-up)?         [x]  Yes     []  No     Can the member verbalize warning  signs and symptoms to watch for and how to respond?         [x]  Yes     []  No     Does the member use a Personal Health Care Record?  Check  Yes  if visit summary, discharge summary, and/or healthcare summary are being used as a PHR.                                                                                                                                                                                    [x] Yes      [] No      Have you updated the member s care plan?  If  No  provide explanation in comments.   Comments:

## 2019-09-20 ENCOUNTER — PATIENT OUTREACH (OUTPATIENT)
Dept: GERIATRIC MEDICINE | Facility: CLINIC | Age: 84
End: 2019-09-20

## 2019-09-20 NOTE — PROGRESS NOTES
9-20-19   CC received email from RN staff at AL that member was back to AL and there were no further cares or services needed at this time so no updated to the RS tool needed.     RN did state that member had mechanical soft diet orders due to teeth issues.  CC stated that if member needed help finding dentist that CC could help with this if needed.   iMrian Weldon MA [facility] Coordinator   [phone]

## 2019-09-24 ENCOUNTER — ASSISTED LIVING VISIT (OUTPATIENT)
Dept: GERIATRICS | Facility: CLINIC | Age: 84
End: 2019-09-24
Payer: COMMERCIAL

## 2019-09-24 VITALS
RESPIRATION RATE: 20 BRPM | TEMPERATURE: 97 F | HEART RATE: 77 BPM | WEIGHT: 195.4 LBS | OXYGEN SATURATION: 93 % | BODY MASS INDEX: 34.61 KG/M2 | DIASTOLIC BLOOD PRESSURE: 76 MMHG | SYSTOLIC BLOOD PRESSURE: 145 MMHG

## 2019-09-24 DIAGNOSIS — I25.118 CORONARY ARTERY DISEASE OF NATIVE HEART WITH STABLE ANGINA PECTORIS, UNSPECIFIED VESSEL OR LESION TYPE (H): ICD-10-CM

## 2019-09-24 DIAGNOSIS — M81.0 OSTEOPOROSIS WITHOUT CURRENT PATHOLOGICAL FRACTURE, UNSPECIFIED OSTEOPOROSIS TYPE: ICD-10-CM

## 2019-09-24 DIAGNOSIS — I70.1 ATHEROSCLEROSIS OF RENAL ARTERY (H): ICD-10-CM

## 2019-09-24 DIAGNOSIS — K11.20 SIALADENITIS: Primary | ICD-10-CM

## 2019-09-24 DIAGNOSIS — R19.5 LOOSE STOOLS: ICD-10-CM

## 2019-09-24 NOTE — PROGRESS NOTES
[facility]  [place] [facility] Record Number:  [id number]  Place of Service where encounter took place:  [facility] [[id number]]  Chief Complaint   Patient presents with     RECHECK       HPI:    Jazmin Clifton  is a 86 year old (12/30/1932), who is being seen today for an episodic care visit.  HPI information obtained from: facility chart records, facility staff, patient report and [facility] chart review. Today's concern is:     Atherosclerosis of renal artery (H)  Coronary artery disease of native heart with stable angina pectoris, unspecified vessel or lesion type (H)  Sialadenitis  Osteoporosis without current pathological fracture, unspecified osteoporosis type  Loose stools   Patient seen today at assisted living facility where she lives.  She recently hospitalized with Sialadentis.  She was seen by ENT as an outpatient and started on 2-week course of Ceftin.  She was initially discharged to TCU, but was discharged back to assisted living last week.  She reports pain in her jaws all gone.  No further swelling or discomfort in her neck.  Does continue to have some abdominal pain and pain in her tongue, but she attributes this to missing so many teeth and is awaiting tooth extraction in October.  She is on a mechanical soft diet, and is able to eat this when provided by facility.  However facility sometimes forgets and does serve her regular textures which she has difficulty eating.    Yajaira's concern today is that she is been having loose stools recently.  Discussed these are likely to her antibiotics, and today's last dose.  She denies any abdominal pain.  Has been afebrile.  No nausea or indigestion, has been tolerating diet.  She has no other concerns today.    Past Medical and Surgical History reviewed in Epic today.    MEDICATIONS:  Current Outpatient Medications   Medication Sig Dispense Refill     vitamin D3 (CHOLECALCIFEROL) 1000 units (25 mcg)  tablet Take 1 tablet (1,000 Units) by mouth daily 30 tablet 11     acetaminophen (TYLENOL) 500 MG tablet Take 1,000 mg by mouth 3 times daily       ASPIRIN LOW DOSE 81 MG chewable tablet CHEW AND SWALLOW ONE TABLET BY MOUTH ONCE DAILY 30 tablet 11     atorvastatin (LIPITOR) 40 MG tablet TAKE 1 TABLET BY MOUTH ONCE DAILY 30 tablet 98     bisacodyl (DULCOLAX) 10 MG suppository Place 10 mg rectally daily as needed for constipation       Blood Glucose Monitoring Suppl CORY 2 times daily Call nurse for further directions if blood glucose is less than 80 or greater than 250       BREO ELLIPTA 100-25 MCG/INH inhaler INHALE 1 PUFF BY MOUTH ONCE DAILY 1 Inhaler 11     calcium carbonate (TUMS) 500 MG chewable tablet Take 1 chew tab by mouth every hour as needed for heartburn       cefuroxime (CEFTIN) 500 MG tablet Take 1 tablet (500 mg) by mouth every 12 hours for 14 days 28 tablet 0     DONEPEZIL HCL PO Take 5 mg by mouth At Bedtime        DULoxetine (CYMBALTA) 30 MG capsule Take 2 capsules (60 mg) by mouth daily AND 1 capsule (30 mg) At Bedtime. 90 capsule 11     furosemide (LASIX) 40 MG tablet Take 1 tablet (40 mg) by mouth 2 times daily 60 tablet 11     gabapentin (NEURONTIN) 100 MG capsule Take 2 capsules (200 mg) by mouth 2 times daily 120 capsule 11     Hypromellose (NATURAL BALANCE TEARS OP) Place 1 drop into both eyes every 6 hours as needed (to both eyes)        levalbuterol (XOPENEX) 1.25 MG/3ML neb solution Take 1 ampule by nebulization every 4 hours as needed for shortness of breath / dyspnea or wheezing And every 4 hours PRN       Lidocaine (LIDOCARE) 4 % Patch Place 1 patch onto the skin daily as needed To desired area (shoulder or hip). On for 12hrs, off for 12hrs       lisinopril (PRINIVIL/ZESTRIL) 20 MG tablet Take 1 tablet (20 mg) by mouth 2 times daily 62 tablet 98     magnesium hydroxide (MILK OF MAGNESIA) 400 MG/5ML suspension Take 30 mLs by mouth daily as needed for constipation or heartburn        Menthol, Topical Analgesic, (BIOFREEZE) 4 % GEL Externally apply topically 4 times daily as needed To left shoulder and right hip       NIFEdipine ER (ADALAT CC) 30 MG 24 hr tablet Take 30 mg by mouth daily       nystatin (MYCOSTATIN) 586816 UNIT/GM external powder APPLY TOPICALLY TO ABDOMEN FOLDS, GROIN, AND BREASTS TWICE DAILY AS NEEDED 60 g 97     OMEPRAZOLE PO Take 40 mg by mouth every morning       ondansetron (ZOFRAN) 4 MG tablet Take 4 mg by mouth every 6 hours as needed for nausea       oxyCODONE (OXYCONTIN) 10 MG 12 hr tablet Take 1 tablet (10 mg) by mouth every 12 hours maximum 2 tablet(s) per day 60 tablet 0     oxyCODONE (ROXICODONE) 5 MG tablet Take 1 tablet (5 mg) by mouth daily as needed for severe pain 30 tablet 0     senna-docusate (SENOKOT-S/PERICOLACE) 8.6-50 MG tablet Take 1 tablet by mouth 2 times daily as needed for constipation        tiZANidine (ZANAFLEX) 2 MG tablet TAKE 1 TABLET BY MOUTH THREE TIMES DAILY AS NEEDED 12 tablet 0           REVIEW OF SYSTEMS:  4 point ROS including Respiratory, CV, GI and , other than that noted in the HPI,  is negative    Objective:  BP (!) 145/76   Pulse 77   Temp 97  F (36.1  C)   Resp 20   Wt 88.6 kg (195 lb 6.4 oz)   SpO2 93%   BMI 34.61 kg/m    Exam:  GENERAL APPEARANCE:  Alert, in no distress, oriented, cooperative  ENT:  Mouth and posterior oropharynx normal, moist mucous membranes, poor dentition, No swelling in jaw  RESP:  respiratory effort and palpation of chest normal, lungs clear to auscultation , no respiratory distress  CV:  Palpation and auscultation of heart done , regular rate and rhythm, no murmur, rub, or gallop, +2 pedal pulses, peripheral edema 2+ in BLE  ABDOMEN:  no guarding or rebound, bowel sounds normal  M/S:   Generalized weakness, decreased range of motion to bilateral shoulders, primarily wheelchair-bound  SKIN:  Inspection of skin and subcutaneous tissue baseline  NEURO:   Cranial nerves 2-12 are normal tested and grossly  at patient's baseline  PSYCH:  oriented X 3, normal insight, judgement and memory, affect and mood normal       Labs:   Recent labs in [facility] reviewed by me today.     ASSESSMENT/PLAN:  (K11.20) Sialadenitis  (primary encounter diagnosis)  Comment: Appears resolved.  Does remain risk for recurrence given current dentition.  Plan: Continue lemon drops or other hard candies to promote salivation.  Complete last dose of Ceftin 500 mg today.  Follow-up with dentist on 10/21 for tooth extraction.    (I70.1) Atherosclerosis of renal artery (H)  Comment: Blood pressure typically less than 150.  Plan: Continue atorvastatin, Lasix 40 mg twice daily, lisinopril 20 mg twice daily, nifedipine ER 30 mg daily, continue to monitor and adjust    (I25.118) Coronary artery disease of native heart with stable angina pectoris, unspecified vessel or lesion type (H)  Comment: Stable  Plan: Continue aspirin, statin.    (M81.0) Osteoporosis without current pathological fracture, unspecified osteoporosis type  Comment: Most recent DEXA scan showed moderate osteopenia.  Mag was discontinued in TCU, most recent mag level normal.  Plan: We will discontinue magnesium supplement.  Resume vitamin D supplement.  Avoid calcium supplementation currently as recently had hyper calcium anemia.  Recheck magnesium level PRN.    (R19.5) Loose stools  Comment: Likely due to recent course of antibiotics.  Plan: Discussed with patient -agreeable to try yogurt a couple times a day, expect improvement now that antibiotic is completed.  If no improvement in next week will consider further work-up.    transcribed by : Aliyah Milan  Orders:  1. Discontinue mag  2. Continue vitamin D3 1000 units daily      Electronically signed by:  MARY Gómez CNP     Disclaimer:  This note consists of symbols derived from keyboarding, dictation, and/or voice recognition software.  As a result, there may be errors in the script that have gone  undetected.  Please consider this when interpreting information found in the chart.

## 2019-09-24 NOTE — LETTER
9/24/2019        RE: Jazmin Clifton  [address]  [facility] [address]        [facility]  [place] [facility] Record Number:  [id number]  Place of Service where encounter took place:  [facility] [[id number]]  Chief Complaint   Patient presents with     RECHECK       HPI:    Jazmin Clifton  is a 86 year old (12/30/1932), who is being seen today for an episodic care visit.  HPI information obtained from: facility chart records, facility staff, patient report and [facility] chart review. Today's concern is:     Atherosclerosis of renal artery (H)  Coronary artery disease of native heart with stable angina pectoris, unspecified vessel or lesion type (H)  Sialadenitis  Osteoporosis without current pathological fracture, unspecified osteoporosis type  Loose stools   Patient seen today at assisted living facility where she lives.  She recently hospitalized with Sialadentis.  She was seen by ENT as an outpatient and started on 2-week course of Ceftin.  She was initially discharged to TCU, but was discharged back to assisted living last week.  She reports pain in her jaws all gone.  No further swelling or discomfort in her neck.  Does continue to have some abdominal pain and pain in her tongue, but she attributes this to missing so many teeth and is awaiting tooth extraction in October.  She is on a mechanical soft diet, and is able to eat this when provided by facility.  However facility sometimes forgets and does serve her regular textures which she has difficulty eating.    Yajaira's concern today is that she is been having loose stools recently.  Discussed these are likely to her antibiotics, and today's last dose.  She denies any abdominal pain.  Has been afebrile.  No nausea or indigestion, has been tolerating diet.  She has no other concerns today.    Past Medical and Surgical History reviewed in Epic today.    MEDICATIONS:  Current Outpatient  Medications   Medication Sig Dispense Refill     vitamin D3 (CHOLECALCIFEROL) 1000 units (25 mcg) tablet Take 1 tablet (1,000 Units) by mouth daily 30 tablet 11     acetaminophen (TYLENOL) 500 MG tablet Take 1,000 mg by mouth 3 times daily       ASPIRIN LOW DOSE 81 MG chewable tablet CHEW AND SWALLOW ONE TABLET BY MOUTH ONCE DAILY 30 tablet 11     atorvastatin (LIPITOR) 40 MG tablet TAKE 1 TABLET BY MOUTH ONCE DAILY 30 tablet 98     bisacodyl (DULCOLAX) 10 MG suppository Place 10 mg rectally daily as needed for constipation       Blood Glucose Monitoring Suppl CORY 2 times daily Call nurse for further directions if blood glucose is less than 80 or greater than 250       BREO ELLIPTA 100-25 MCG/INH inhaler INHALE 1 PUFF BY MOUTH ONCE DAILY 1 Inhaler 11     calcium carbonate (TUMS) 500 MG chewable tablet Take 1 chew tab by mouth every hour as needed for heartburn       cefuroxime (CEFTIN) 500 MG tablet Take 1 tablet (500 mg) by mouth every 12 hours for 14 days 28 tablet 0     DONEPEZIL HCL PO Take 5 mg by mouth At Bedtime        DULoxetine (CYMBALTA) 30 MG capsule Take 2 capsules (60 mg) by mouth daily AND 1 capsule (30 mg) At Bedtime. 90 capsule 11     furosemide (LASIX) 40 MG tablet Take 1 tablet (40 mg) by mouth 2 times daily 60 tablet 11     gabapentin (NEURONTIN) 100 MG capsule Take 2 capsules (200 mg) by mouth 2 times daily 120 capsule 11     Hypromellose (NATURAL BALANCE TEARS OP) Place 1 drop into both eyes every 6 hours as needed (to both eyes)        levalbuterol (XOPENEX) 1.25 MG/3ML neb solution Take 1 ampule by nebulization every 4 hours as needed for shortness of breath / dyspnea or wheezing And every 4 hours PRN       Lidocaine (LIDOCARE) 4 % Patch Place 1 patch onto the skin daily as needed To desired area (shoulder or hip). On for 12hrs, off for 12hrs       lisinopril (PRINIVIL/ZESTRIL) 20 MG tablet Take 1 tablet (20 mg) by mouth 2 times daily 62 tablet 98     magnesium hydroxide (MILK OF MAGNESIA)  400 MG/5ML suspension Take 30 mLs by mouth daily as needed for constipation or heartburn       Menthol, Topical Analgesic, (BIOFREEZE) 4 % GEL Externally apply topically 4 times daily as needed To left shoulder and right hip       NIFEdipine ER (ADALAT CC) 30 MG 24 hr tablet Take 30 mg by mouth daily       nystatin (MYCOSTATIN) 161287 UNIT/GM external powder APPLY TOPICALLY TO ABDOMEN FOLDS, GROIN, AND BREASTS TWICE DAILY AS NEEDED 60 g 97     OMEPRAZOLE PO Take 40 mg by mouth every morning       ondansetron (ZOFRAN) 4 MG tablet Take 4 mg by mouth every 6 hours as needed for nausea       oxyCODONE (OXYCONTIN) 10 MG 12 hr tablet Take 1 tablet (10 mg) by mouth every 12 hours maximum 2 tablet(s) per day 60 tablet 0     oxyCODONE (ROXICODONE) 5 MG tablet Take 1 tablet (5 mg) by mouth daily as needed for severe pain 30 tablet 0     senna-docusate (SENOKOT-S/PERICOLACE) 8.6-50 MG tablet Take 1 tablet by mouth 2 times daily as needed for constipation        tiZANidine (ZANAFLEX) 2 MG tablet TAKE 1 TABLET BY MOUTH THREE TIMES DAILY AS NEEDED 12 tablet 0           REVIEW OF SYSTEMS:  4 point ROS including Respiratory, CV, GI and , other than that noted in the HPI,  is negative    Objective:  BP (!) 145/76   Pulse 77   Temp 97  F (36.1  C)   Resp 20   Wt 88.6 kg (195 lb 6.4 oz)   SpO2 93%   BMI 34.61 kg/m     Exam:  GENERAL APPEARANCE:  Alert, in no distress, oriented, cooperative  ENT:  Mouth and posterior oropharynx normal, moist mucous membranes, poor dentition, No swelling in jaw  RESP:  respiratory effort and palpation of chest normal, lungs clear to auscultation , no respiratory distress  CV:  Palpation and auscultation of heart done , regular rate and rhythm, no murmur, rub, or gallop, +2 pedal pulses, peripheral edema 2+ in BLE  ABDOMEN:  no guarding or rebound, bowel sounds normal  M/S:   Generalized weakness, decreased range of motion to bilateral shoulders, primarily wheelchair-bound  SKIN:  Inspection of  skin and subcutaneous tissue baseline  NEURO:   Cranial nerves 2-12 are normal tested and grossly at patient's baseline  PSYCH:  oriented X 3, normal insight, judgement and memory, affect and mood normal       Labs:   Recent labs in [facility] reviewed by me today.     ASSESSMENT/PLAN:  (K11.20) Sialadenitis  (primary encounter diagnosis)  Comment: Appears resolved.  Does remain risk for recurrence given current dentition.  Plan: Continue lemon drops or other hard candies to promote salivation.  Complete last dose of Ceftin 500 mg today.  Follow-up with dentist on 10/21 for tooth extraction.    (I70.1) Atherosclerosis of renal artery (H)  Comment: Blood pressure typically less than 150.  Plan: Continue atorvastatin, Lasix 40 mg twice daily, lisinopril 20 mg twice daily, nifedipine ER 30 mg daily, continue to monitor and adjust    (I25.118) Coronary artery disease of native heart with stable angina pectoris, unspecified vessel or lesion type (H)  Comment: Stable  Plan: Continue aspirin, statin.    (M81.0) Osteoporosis without current pathological fracture, unspecified osteoporosis type  Comment: Most recent DEXA scan showed moderate osteopenia.  Mag was discontinued in TCU, most recent mag level normal.  Plan: We will discontinue magnesium supplement.  Resume vitamin D supplement.  Avoid calcium supplementation currently as recently had hyper calcium anemia.  Recheck magnesium level PRN.    (R19.5) Loose stools  Comment: Likely due to recent course of antibiotics.  Plan: Discussed with patient -agreeable to try yogurt a couple times a day, expect improvement now that antibiotic is completed.  If no improvement in next week will consider further work-up.    transcribed by : Aliyah Milan  Orders:  1. Discontinue mag  2. Continue vitamin D3 1000 units daily      Electronically signed by:  MARY Gómez CNP     Disclaimer:  This note consists of symbols derived from keyboarding, dictation, and/or  voice recognition software.  As a result, there may be errors in the script that have gone undetected.  Please consider this when interpreting information found in the chart.          Sincerely,        MARY Gómez CNP

## 2019-09-27 NOTE — PROGRESS NOTES
[facility] Six-Month Telephone Assessment    6 month telephone assessment completed.    ER visits: Yes -  [facility]  Hospitalizations: Yes -  [facility]  TCU stays: Yes -  [facility] and then transferred to [facility] on     Significant health status changes: back pain   Falls/Injuries: No  ADL/IADL changes: No - reviewed with AL staff and will update RS tool as needed   Changes in services: No    Caregiver Assessment follow up:  None     Goals: See POC in chart for goal progress documentation.  Updated     Will see member in 6 months for an annual health risk assessment.   Encouraged member to call [facility] with any questions or concerns in the meantime.       Mirian Weldon MA LSW   [facility] Care Coordinator   [phone]

## 2019-10-04 ENCOUNTER — HOSPITAL LABORATORY (OUTPATIENT)
Facility: OTHER | Age: 84
End: 2019-10-04

## 2019-10-04 DIAGNOSIS — G89.4 CHRONIC PAIN SYNDROME: ICD-10-CM

## 2019-10-04 DIAGNOSIS — M51.369 DDD (DEGENERATIVE DISC DISEASE), LUMBAR: ICD-10-CM

## 2019-10-04 RX ORDER — OXYCODONE HCL 10 MG/1
10 TABLET, FILM COATED, EXTENDED RELEASE ORAL EVERY 12 HOURS
Qty: 60 TABLET | Refills: 0 | Status: SHIPPED | OUTPATIENT
Start: 2019-10-04 | End: 2019-10-21

## 2019-10-05 ENCOUNTER — TELEPHONE (OUTPATIENT)
Dept: GERIATRICS | Facility: CLINIC | Age: 84
End: 2019-10-05

## 2019-10-05 LAB
C DIFF TOX B STL QL: POSITIVE
SPECIMEN SOURCE: ABNORMAL

## 2019-10-05 NOTE — TELEPHONE ENCOUNTER
Patient positive for c diff. Start flagyl 500 mg PO TID x 7 days.     Electronically signed by MARY Hernandez GNP

## 2019-10-07 NOTE — PROGRESS NOTES
[facility]  [facility] Record Number:  [id number]  Place of Service where encounter took place:  [facility] [[id number]]  Chief Complaint   Patient presents with     RECHECK       HPI:    Jazmin Clifton  is a 86 year old (12/30/1932), who is being seen today for an episodic care visit.  HPI information obtained from: facility chart records, facility staff, patient report and [facility] Epic chart review. Today's concern is:     Clostridium difficile diarrhea  Lymphedema  Tremor  Essential hypertension     Patient seen to follow-up on diarrhea.  Developed diarrhea about 3 weeks ago.  Has been on antibiotics for approximately 3 weeks for Sialadentis. Had received about 1 week of cefprozil, was changed 2-week course of cefuroxime by ENT, which she completed on 9/27.  Had worsening diarrhea after completion of antibiotics, so C. difficile test was sent, returned positive on 10/5.  She was started on Flagyl 500 mg p.o. 3 times daily x7 days.    Does feel the medication is helping, reports decreased stool frequency.  She did have a large formed bowel movement on exam today.  No melena noted. She remains afebrile.  Denies any belly pain.  Appetite is at baseline.  She has been eating yogurt and drinking adequate fluids.  Is feeling better.    She does complain of occasional tremor to head and right arm.  Tremor lasted briefly but when subject was changed completely resolved.  Tremor returned again later in the visit when family of a neighbor stopped in to inform her that her neighbor had passed away and obviously this is very upsetting to her.    She also complained of feeling that her left breast was larger than her right breast.  No significant difference in size noted on exam, however she feels it is larger and more palpable than the right side.  She does have issues with chronic lymphedema, and reports that she sleeps on her left side.  Breast is not painful or  uncomfortable, but she is concerned about size.    Past Medical and Surgical History reviewed in Epic today.    MEDICATIONS:  Current Outpatient Medications   Medication Sig Dispense Refill     acetaminophen (TYLENOL) 500 MG tablet Take 1,000 mg by mouth 3 times daily       ASPIRIN LOW DOSE 81 MG chewable tablet CHEW AND SWALLOW ONE TABLET BY MOUTH ONCE DAILY 30 tablet 11     atorvastatin (LIPITOR) 40 MG tablet TAKE 1 TABLET BY MOUTH ONCE DAILY 30 tablet 98     bisacodyl (DULCOLAX) 10 MG suppository Place 10 mg rectally daily as needed for constipation       Blood Glucose Monitoring Suppl CORY 2 times daily Call nurse for further directions if blood glucose is less than 80 or greater than 250       BREO ELLIPTA 100-25 MCG/INH inhaler INHALE 1 PUFF BY MOUTH ONCE DAILY 1 Inhaler 11     calcium carbonate (TUMS) 500 MG chewable tablet Take 1 chew tab by mouth every hour as needed for heartburn       DONEPEZIL HCL PO Take 5 mg by mouth At Bedtime        DULoxetine (CYMBALTA) 30 MG capsule Take 2 capsules (60 mg) by mouth daily AND 1 capsule (30 mg) At Bedtime. 90 capsule 11     furosemide (LASIX) 40 MG tablet Take 1 tablet (40 mg) by mouth 2 times daily 60 tablet 11     gabapentin (NEURONTIN) 100 MG capsule Take 2 capsules (200 mg) by mouth 2 times daily 120 capsule 11     Hypromellose (NATURAL BALANCE TEARS OP) Place 1 drop into both eyes every 6 hours as needed (to both eyes)        levalbuterol (XOPENEX) 1.25 MG/3ML neb solution Take 1 ampule by nebulization every 4 hours as needed for shortness of breath / dyspnea or wheezing And every 4 hours PRN       Lidocaine (LIDOCARE) 4 % Patch Place 1 patch onto the skin daily as needed To desired area (shoulder or hip). On for 12hrs, off for 12hrs       lisinopril (PRINIVIL/ZESTRIL) 20 MG tablet Take 1 tablet (20 mg) by mouth 2 times daily 62 tablet 98     magnesium hydroxide (MILK OF MAGNESIA) 400 MG/5ML suspension Take 30 mLs by mouth daily as needed for constipation or  heartburn       Menthol, Topical Analgesic, (BIOFREEZE) 4 % GEL Externally apply topically 4 times daily as needed To left shoulder and right hip       metroNIDAZOLE (FLAGYL) 500 MG tablet Take 500 mg by mouth 3 times daily       NIFEdipine ER (ADALAT CC) 30 MG 24 hr tablet Take 30 mg by mouth daily       nystatin (MYCOSTATIN) 701342 UNIT/GM external powder APPLY TOPICALLY TO ABDOMEN FOLDS, GROIN, AND BREASTS TWICE DAILY AS NEEDED 60 g 97     OMEPRAZOLE PO Take 40 mg by mouth every morning       ondansetron (ZOFRAN) 4 MG tablet Take 4 mg by mouth every 6 hours as needed for nausea       oxyCODONE (OXYCONTIN) 10 MG 12 hr tablet Take 1 tablet (10 mg) by mouth every 12 hours maximum 2 tablet(s) per day 60 tablet 0     oxyCODONE (ROXICODONE) 5 MG tablet Take 1 tablet (5 mg) by mouth daily as needed for severe pain 30 tablet 0     senna-docusate (SENOKOT-S/PERICOLACE) 8.6-50 MG tablet Take 1 tablet by mouth 2 times daily as needed for constipation        tiZANidine (ZANAFLEX) 2 MG tablet TAKE 1 TABLET BY MOUTH THREE TIMES DAILY AS NEEDED 12 tablet 0     vitamin D3 (CHOLECALCIFEROL) 1000 units (25 mcg) tablet Take 1 tablet (1,000 Units) by mouth daily 30 tablet 11         REVIEW OF SYSTEMS:  4 point ROS including Respiratory, CV, GI and , other than that noted in the HPI,  is negative    Objective:  BP (!) 144/79   Pulse 73   Temp 97  F (36.1  C)   Resp 22   Wt 88.5 kg (195 lb)   SpO2 94%   BMI 34.54 kg/m    Exam:  GENERAL APPEARANCE:  Alert, in no distress, oriented, cooperative  RESP:  respiratory effort and palpation of chest normal, lungs clear to auscultation , no respiratory distress  CV:  Palpation and auscultation of heart done , regular rate and rhythm, no murmur, rub, or gallop, +2 pedal pulses, peripheral edema 1-2+ in BLE, 1-2 LUE,  CHEST (BREASTS):  Inspection of breasts are symmetrical and no discharge and Palpation of breasts and axillae done, no masses, lumps or tenderness  ABDOMEN:  non-distended,  nontender, moderately hyperactive bowel sounds, no guarding or rebound  SKIN:  Inspection of skin and subcutaneous tissue baseline  NEURO:   Cranial nerves 2-12 are normal tested and grossly at patient's baseline    Labs:   Recent labs in [facility] reviewed by me today.     ASSESSMENT/PLAN:  (A04.72) Clostridium difficile diarrhea  (primary encounter diagnosis)  Comment: Likely due to recent antibiotics use, recent hospitalization, and chronic PPI use.  No signs or symptoms of toxic megacolon.  Symptoms improving on current dose of Flagyl.  Plan: Continue Flagyl 500 mg 3 times daily x7 days.  Staff to update provider if becomes febrile, has worsening belly pain, or worsen diarrhea as may need length of treatment increased or to be switched to Vanco.    (I89.0) Lymphedema  Comment: Suspect change in breast is due to generalized lymphedema as she sleeps on her left side and reports symptoms only started this morning.  No obvious deformities to breast, no signs or symptoms of underlying malignancy.  Plan: Continue Lasix 40 mg twice daily, encourage mobility; lymphedema therapist is following.    (R25.1) Tremor  Comment: Unclear cause.  Does have significant polypharmacy and is on multiple pain medications due to chronic pain.  She does follow with pain clinic.  Suspect anxiety and stress are large component as tremor resolved when were no longer discussing, but did remove resume later after finding out about the death of her friend.  Plan: Continue to monitor.  Staff to update with any significant changes or concerns.    (I10) Essential hypertension  Comment: Appears fairly well controlled.  Pain and anxiety likely contribute at times.  Recently changed to nifedipine ER as isradipine was not available.  Plan: Can continue furosemide 20 mg twice daily, lisinopril 20 mg twice daily, nifedipine ER 30 mg daily.  Continue to monitor and adjust.    transcribed by : Aliyah Milan  Orders:  1. Update NP if becomes  febrile, has worsening diarrhea, abdominal pain in the next 14 days      Electronically signed by:  MARY Gómez CNP

## 2019-10-08 ENCOUNTER — ASSISTED LIVING VISIT (OUTPATIENT)
Dept: GERIATRICS | Facility: CLINIC | Age: 84
End: 2019-10-08
Payer: COMMERCIAL

## 2019-10-08 VITALS
WEIGHT: 195 LBS | RESPIRATION RATE: 22 BRPM | DIASTOLIC BLOOD PRESSURE: 79 MMHG | TEMPERATURE: 97 F | HEART RATE: 73 BPM | SYSTOLIC BLOOD PRESSURE: 144 MMHG | BODY MASS INDEX: 34.54 KG/M2 | OXYGEN SATURATION: 94 %

## 2019-10-08 DIAGNOSIS — I89.0 LYMPHEDEMA: ICD-10-CM

## 2019-10-08 DIAGNOSIS — A04.72 CLOSTRIDIUM DIFFICILE DIARRHEA: Primary | ICD-10-CM

## 2019-10-08 DIAGNOSIS — I10 ESSENTIAL HYPERTENSION: ICD-10-CM

## 2019-10-08 DIAGNOSIS — R25.1 TREMOR: ICD-10-CM

## 2019-10-08 RX ORDER — METRONIDAZOLE 500 MG/1
500 TABLET ORAL 3 TIMES DAILY
Status: ON HOLD | COMMUNITY
End: 2019-01-01

## 2019-10-08 NOTE — LETTER
10/8/2019        RE: Jazmin Clifton  [address]  [facility] [address]        [facility]  [facility] Record Number:  [id number]  Place of Service where encounter took place:  [facility] [[id number]]  Chief Complaint   Patient presents with     RECHECK       HPI:    Jazmin Clifton  is a 86 year old (12/30/1932), who is being seen today for an episodic care visit.  HPI information obtained from: facility chart records, facility staff, patient report and [facility] chart review. Today's concern is:     Clostridium difficile diarrhea  Lymphedema  Tremor  Essential hypertension     Patient seen to follow-up on diarrhea.  Developed diarrhea about 3 weeks ago.  Has been on antibiotics for approximately 3 weeks for Sialadentis. Had received about 1 week of cefprozil, was changed 2-week course of cefuroxime by ENT, which she completed on 9/27.  Had worsening diarrhea after completion of antibiotics, so C. difficile test was sent, returned positive on 10/5.  She was started on Flagyl 500 mg p.o. 3 times daily x7 days.    Does feel the medication is helping, reports decreased stool frequency.  She did have a large formed bowel movement on exam today.  No melena noted. She remains afebrile.  Denies any belly pain.  Appetite is at baseline.  She has been eating yogurt and drinking adequate fluids.  Is feeling better.    She does complain of occasional tremor to head and right arm.  Tremor lasted briefly but when subject was changed completely resolved.  Tremor returned again later in the visit when family of a neighbor stopped in to inform her that her neighbor had passed away and obviously this is very upsetting to her.    She also complained of feeling that her left breast was larger than her right breast.  No significant difference in size noted on exam, however she feels it is larger and more palpable than the right side.  She does have issues with  chronic lymphedema, and reports that she sleeps on her left side.  Breast is not painful or uncomfortable, but she is concerned about size.    Past Medical and Surgical History reviewed in Epic today.    MEDICATIONS:  Current Outpatient Medications   Medication Sig Dispense Refill     acetaminophen (TYLENOL) 500 MG tablet Take 1,000 mg by mouth 3 times daily       ASPIRIN LOW DOSE 81 MG chewable tablet CHEW AND SWALLOW ONE TABLET BY MOUTH ONCE DAILY 30 tablet 11     atorvastatin (LIPITOR) 40 MG tablet TAKE 1 TABLET BY MOUTH ONCE DAILY 30 tablet 98     bisacodyl (DULCOLAX) 10 MG suppository Place 10 mg rectally daily as needed for constipation       Blood Glucose Monitoring Suppl CORY 2 times daily Call nurse for further directions if blood glucose is less than 80 or greater than 250       BREO ELLIPTA 100-25 MCG/INH inhaler INHALE 1 PUFF BY MOUTH ONCE DAILY 1 Inhaler 11     calcium carbonate (TUMS) 500 MG chewable tablet Take 1 chew tab by mouth every hour as needed for heartburn       DONEPEZIL HCL PO Take 5 mg by mouth At Bedtime        DULoxetine (CYMBALTA) 30 MG capsule Take 2 capsules (60 mg) by mouth daily AND 1 capsule (30 mg) At Bedtime. 90 capsule 11     furosemide (LASIX) 40 MG tablet Take 1 tablet (40 mg) by mouth 2 times daily 60 tablet 11     gabapentin (NEURONTIN) 100 MG capsule Take 2 capsules (200 mg) by mouth 2 times daily 120 capsule 11     Hypromellose (NATURAL BALANCE TEARS OP) Place 1 drop into both eyes every 6 hours as needed (to both eyes)        levalbuterol (XOPENEX) 1.25 MG/3ML neb solution Take 1 ampule by nebulization every 4 hours as needed for shortness of breath / dyspnea or wheezing And every 4 hours PRN       Lidocaine (LIDOCARE) 4 % Patch Place 1 patch onto the skin daily as needed To desired area (shoulder or hip). On for 12hrs, off for 12hrs       lisinopril (PRINIVIL/ZESTRIL) 20 MG tablet Take 1 tablet (20 mg) by mouth 2 times daily 62 tablet 98     magnesium hydroxide (MILK OF  MAGNESIA) 400 MG/5ML suspension Take 30 mLs by mouth daily as needed for constipation or heartburn       Menthol, Topical Analgesic, (BIOFREEZE) 4 % GEL Externally apply topically 4 times daily as needed To left shoulder and right hip       metroNIDAZOLE (FLAGYL) 500 MG tablet Take 500 mg by mouth 3 times daily       NIFEdipine ER (ADALAT CC) 30 MG 24 hr tablet Take 30 mg by mouth daily       nystatin (MYCOSTATIN) 774297 UNIT/GM external powder APPLY TOPICALLY TO ABDOMEN FOLDS, GROIN, AND BREASTS TWICE DAILY AS NEEDED 60 g 97     OMEPRAZOLE PO Take 40 mg by mouth every morning       ondansetron (ZOFRAN) 4 MG tablet Take 4 mg by mouth every 6 hours as needed for nausea       oxyCODONE (OXYCONTIN) 10 MG 12 hr tablet Take 1 tablet (10 mg) by mouth every 12 hours maximum 2 tablet(s) per day 60 tablet 0     oxyCODONE (ROXICODONE) 5 MG tablet Take 1 tablet (5 mg) by mouth daily as needed for severe pain 30 tablet 0     senna-docusate (SENOKOT-S/PERICOLACE) 8.6-50 MG tablet Take 1 tablet by mouth 2 times daily as needed for constipation        tiZANidine (ZANAFLEX) 2 MG tablet TAKE 1 TABLET BY MOUTH THREE TIMES DAILY AS NEEDED 12 tablet 0     vitamin D3 (CHOLECALCIFEROL) 1000 units (25 mcg) tablet Take 1 tablet (1,000 Units) by mouth daily 30 tablet 11         REVIEW OF SYSTEMS:  4 point ROS including Respiratory, CV, GI and , other than that noted in the HPI,  is negative    Objective:  BP (!) 144/79   Pulse 73   Temp 97  F (36.1  C)   Resp 22   Wt 88.5 kg (195 lb)   SpO2 94%   BMI 34.54 kg/m     Exam:  GENERAL APPEARANCE:  Alert, in no distress, oriented, cooperative  RESP:  respiratory effort and palpation of chest normal, lungs clear to auscultation , no respiratory distress  CV:  Palpation and auscultation of heart done , regular rate and rhythm, no murmur, rub, or gallop, +2 pedal pulses, peripheral edema 1-2+ in BLE, 1-2 LUE,  CHEST (BREASTS):  Inspection of breasts are symmetrical and no discharge and  Palpation of breasts and axillae done, no masses, lumps or tenderness  ABDOMEN:  non-distended, nontender, moderately hyperactive bowel sounds, no guarding or rebound  SKIN:  Inspection of skin and subcutaneous tissue baseline  NEURO:   Cranial nerves 2-12 are normal tested and grossly at patient's baseline    Labs:   Recent labs in [facility] reviewed by me today.     ASSESSMENT/PLAN:  (A04.72) Clostridium difficile diarrhea  (primary encounter diagnosis)  Comment: Likely due to recent antibiotics use, recent hospitalization, and chronic PPI use.  No signs or symptoms of toxic megacolon.  Symptoms improving on current dose of Flagyl.  Plan: Continue Flagyl 500 mg 3 times daily x7 days.  Staff to update provider if becomes febrile, has worsening belly pain, or worsen diarrhea as may need length of treatment increased or to be switched to Vanco.    (I89.0) Lymphedema  Comment: Suspect change in breast is due to generalized lymphedema as she sleeps on her left side and reports symptoms only started this morning.  No obvious deformities to breast, no signs or symptoms of underlying malignancy.  Plan: Continue Lasix 40 mg twice daily, encourage mobility; lymphedema therapist is following.    (R25.1) Tremor  Comment: Unclear cause.  Does have significant polypharmacy and is on multiple pain medications due to chronic pain.  She does follow with pain clinic.  Suspect anxiety and stress are large component as tremor resolved when were no longer discussing, but did remove resume later after finding out about the death of her friend.  Plan: Continue to monitor.  Staff to update with any significant changes or concerns.    (I10) Essential hypertension  Comment: Appears fairly well controlled.  Pain and anxiety likely contribute at times.  Recently changed to nifedipine ER as isradipine was not available.  Plan: Can continue furosemide 20 mg twice daily, lisinopril 20 mg twice daily, nifedipine ER 30 mg daily.  Continue to monitor  and adjust.    transcribed by : Aliyah Milan  Orders:  1. Update NP if becomes febrile, has worsening diarrhea, abdominal pain in the next 14 days      Electronically signed by:  MARY Gómez CNP             Sincerely,        MARY Gómez CNP

## 2019-10-17 ENCOUNTER — HOSPITAL ENCOUNTER (EMERGENCY)
Facility: CLINIC | Age: 84
Discharge: HOME | End: 2019-10-18
Attending: STUDENT IN AN ORGANIZED HEALTH CARE EDUCATION/TRAINING PROGRAM | Admitting: STUDENT IN AN ORGANIZED HEALTH CARE EDUCATION/TRAINING PROGRAM
Payer: COMMERCIAL

## 2019-10-17 DIAGNOSIS — M25.511 ACUTE PAIN OF RIGHT SHOULDER: ICD-10-CM

## 2019-10-17 LAB
ALBUMIN SERPL-MCNC: 3.1 G/DL (ref 3.4–5)
ALP SERPL-CCNC: 92 U/L (ref 40–150)
ALT SERPL W P-5'-P-CCNC: 15 U/L (ref 0–50)
ANION GAP SERPL CALCULATED.3IONS-SCNC: 7 MMOL/L (ref 3–14)
AST SERPL W P-5'-P-CCNC: 13 U/L (ref 0–45)
BASOPHILS # BLD AUTO: 0 10E9/L (ref 0–0.2)
BASOPHILS NFR BLD AUTO: 0.3 %
BILIRUB SERPL-MCNC: 0.2 MG/DL (ref 0.2–1.3)
BUN SERPL-MCNC: 35 MG/DL (ref 7–30)
CALCIUM SERPL-MCNC: 9.1 MG/DL (ref 8.5–10.1)
CHLORIDE SERPL-SCNC: 107 MMOL/L (ref 94–109)
CO2 SERPL-SCNC: 29 MMOL/L (ref 20–32)
CREAT SERPL-MCNC: 1.43 MG/DL (ref 0.52–1.04)
DIFFERENTIAL METHOD BLD: ABNORMAL
EOSINOPHIL # BLD AUTO: 0.2 10E9/L (ref 0–0.7)
EOSINOPHIL NFR BLD AUTO: 3 %
ERYTHROCYTE [DISTWIDTH] IN BLOOD BY AUTOMATED COUNT: 13.3 % (ref 10–15)
GFR SERPL CREATININE-BSD FRML MDRD: 33 ML/MIN/{1.73_M2}
GLUCOSE SERPL-MCNC: 176 MG/DL (ref 70–99)
HCT VFR BLD AUTO: 34.4 % (ref 35–47)
HGB BLD-MCNC: 10.5 G/DL (ref 11.7–15.7)
IMM GRANULOCYTES # BLD: 0 10E9/L (ref 0–0.4)
IMM GRANULOCYTES NFR BLD: 0.3 %
LYMPHOCYTES # BLD AUTO: 1 10E9/L (ref 0.8–5.3)
LYMPHOCYTES NFR BLD AUTO: 14.4 %
MCH RBC QN AUTO: 28.6 PG (ref 26.5–33)
MCHC RBC AUTO-ENTMCNC: 30.5 G/DL (ref 31.5–36.5)
MCV RBC AUTO: 94 FL (ref 78–100)
MONOCYTES # BLD AUTO: 0.6 10E9/L (ref 0–1.3)
MONOCYTES NFR BLD AUTO: 8.1 %
NEUTROPHILS # BLD AUTO: 5 10E9/L (ref 1.6–8.3)
NEUTROPHILS NFR BLD AUTO: 73.9 %
NRBC # BLD AUTO: 0 10*3/UL
NRBC BLD AUTO-RTO: 0 /100
PLATELET # BLD AUTO: 225 10E9/L (ref 150–450)
POTASSIUM SERPL-SCNC: 3.9 MMOL/L (ref 3.4–5.3)
PROT SERPL-MCNC: 6.3 G/DL (ref 6.8–8.8)
RBC # BLD AUTO: 3.67 10E12/L (ref 3.8–5.2)
SODIUM SERPL-SCNC: 143 MMOL/L (ref 133–144)
TROPONIN I SERPL-MCNC: <0.015 UG/L (ref 0–0.04)
WBC # BLD AUTO: 6.8 10E9/L (ref 4–11)

## 2019-10-17 PROCEDURE — 99285 EMERGENCY DEPT VISIT HI MDM: CPT | Mod: 25

## 2019-10-17 PROCEDURE — 25000128 H RX IP 250 OP 636: Performed by: STUDENT IN AN ORGANIZED HEALTH CARE EDUCATION/TRAINING PROGRAM

## 2019-10-17 PROCEDURE — 80053 COMPREHEN METABOLIC PANEL: CPT | Performed by: STUDENT IN AN ORGANIZED HEALTH CARE EDUCATION/TRAINING PROGRAM

## 2019-10-17 PROCEDURE — 96374 THER/PROPH/DIAG INJ IV PUSH: CPT

## 2019-10-17 PROCEDURE — 85025 COMPLETE CBC W/AUTO DIFF WBC: CPT | Performed by: STUDENT IN AN ORGANIZED HEALTH CARE EDUCATION/TRAINING PROGRAM

## 2019-10-17 PROCEDURE — 99285 EMERGENCY DEPT VISIT HI MDM: CPT | Mod: Z6 | Performed by: STUDENT IN AN ORGANIZED HEALTH CARE EDUCATION/TRAINING PROGRAM

## 2019-10-17 PROCEDURE — 84484 ASSAY OF TROPONIN QUANT: CPT | Performed by: STUDENT IN AN ORGANIZED HEALTH CARE EDUCATION/TRAINING PROGRAM

## 2019-10-17 RX ORDER — IOPAMIDOL 755 MG/ML
75 INJECTION, SOLUTION INTRAVASCULAR ONCE
Status: DISCONTINUED | OUTPATIENT
Start: 2019-10-17 | End: 2019-10-18 | Stop reason: HOSPADM

## 2019-10-17 RX ORDER — HYDROMORPHONE HYDROCHLORIDE 1 MG/ML
0.3 INJECTION, SOLUTION INTRAMUSCULAR; INTRAVENOUS; SUBCUTANEOUS
Status: DISCONTINUED | OUTPATIENT
Start: 2019-10-17 | End: 2019-10-18 | Stop reason: HOSPADM

## 2019-10-17 RX ORDER — IOPAMIDOL 755 MG/ML
100 INJECTION, SOLUTION INTRAVASCULAR ONCE
Status: DISCONTINUED | OUTPATIENT
Start: 2019-10-17 | End: 2019-10-18 | Stop reason: HOSPADM

## 2019-10-17 RX ADMIN — HYDROMORPHONE HYDROCHLORIDE 0.3 MG: 1 INJECTION, SOLUTION INTRAMUSCULAR; INTRAVENOUS; SUBCUTANEOUS at 22:20

## 2019-10-17 ASSESSMENT — MIFFLIN-ST. JEOR: SCORE: 1280.04

## 2019-10-17 NOTE — ED AVS SNAPSHOT
[facility] Emergency Department  [address] [facility] [address]  Phone:  [phone]  Fax:  [phone]                                    Jazmin Clifton   MRN: [id number]    Department:  [facility] Emergency Department   Date of Visit:  10/17/2019           After Visit Summary Signature Page    I have received my discharge instructions, and my questions have been answered. I have discussed any challenges I see with this plan with the nurse or doctor.    ..........................................................................................................................................  Patient/Patient Representative Signature      ..........................................................................................................................................  Patient Representative Print Name and Relationship to Patient    ..................................................               ................................................  Date                                   Time    ..........................................................................................................................................  Reviewed by Signature/Title    ...................................................              ..............................................  Date                                               Time          22EPIC Rev 08/18

## 2019-10-18 ENCOUNTER — PATIENT OUTREACH (OUTPATIENT)
Dept: GERIATRIC MEDICINE | Facility: CLINIC | Age: 84
End: 2019-10-18

## 2019-10-18 ENCOUNTER — APPOINTMENT (OUTPATIENT)
Dept: CT IMAGING | Facility: CLINIC | Age: 84
End: 2019-10-18
Attending: STUDENT IN AN ORGANIZED HEALTH CARE EDUCATION/TRAINING PROGRAM
Payer: COMMERCIAL

## 2019-10-18 ENCOUNTER — APPOINTMENT (OUTPATIENT)
Dept: ULTRASOUND IMAGING | Facility: CLINIC | Age: 84
End: 2019-10-18
Attending: STUDENT IN AN ORGANIZED HEALTH CARE EDUCATION/TRAINING PROGRAM
Payer: COMMERCIAL

## 2019-10-18 VITALS
RESPIRATION RATE: 26 BRPM | HEIGHT: 63 IN | HEART RATE: 69 BPM | WEIGHT: 192 LBS | TEMPERATURE: 98.9 F | DIASTOLIC BLOOD PRESSURE: 88 MMHG | SYSTOLIC BLOOD PRESSURE: 158 MMHG | OXYGEN SATURATION: 97 % | BODY MASS INDEX: 34.02 KG/M2

## 2019-10-18 PROCEDURE — 93971 EXTREMITY STUDY: CPT | Mod: LT

## 2019-10-18 PROCEDURE — 25000128 H RX IP 250 OP 636: Performed by: STUDENT IN AN ORGANIZED HEALTH CARE EDUCATION/TRAINING PROGRAM

## 2019-10-18 PROCEDURE — 71250 CT THORAX DX C-: CPT

## 2019-10-18 PROCEDURE — 93931 UPPER EXTREMITY STUDY: CPT | Mod: LT

## 2019-10-18 PROCEDURE — 96361 HYDRATE IV INFUSION ADD-ON: CPT

## 2019-10-18 PROCEDURE — 96376 TX/PRO/DX INJ SAME DRUG ADON: CPT

## 2019-10-18 RX ADMIN — SODIUM CHLORIDE 500 ML: 9 INJECTION, SOLUTION INTRAVENOUS at 01:14

## 2019-10-18 RX ADMIN — HYDROMORPHONE HYDROCHLORIDE 0.3 MG: 1 INJECTION, SOLUTION INTRAMUSCULAR; INTRAVENOUS; SUBCUTANEOUS at 01:14

## 2019-10-18 NOTE — ED NOTES
Pt c/o left shoulder pain x multiple months, worsening this am. Pain with movement. Per nursing home pt has suffered many loses recently and they are wondering if any psychosocial cause to pain

## 2019-10-18 NOTE — ED PROVIDER NOTES
History     Chief Complaint   Patient presents with     Shoulder Pain     with movement     HPI  Jazmin Clifton is a 86 year old female with past medical history which includes essential hypertension, CHF, CKD, COPD and CVA who presents from SNF by EMS for evaluation of left-sided upper extremity pain.  Patient says that few weeks ago while using her left upper extremity to lift herself up using a bed rail she felt sudden pain of the left biceps region.  She says that she has had persistent pain of the area since onset but seems to be radiating distally towards left wrist but also proximally towards the left scapula.  She denies any other traumatic injuries or falls.  She denies associated symptoms such as fever, chills, chest pain, shortness of breath, midline back pain, abdominal pain, weakness or sensory deficits.     Allergies:  Allergies   Allergen Reactions     Accupril      Ace Inhibitors Unknown     Accupril     Augmentin Unknown     Blood-Group Specific Substance Other (See Comments)     Patient has a Non-specific antibody. Blood Product orders may be delayed.  Draw one red top and two purple top tubes for ALL Type and Screen/ Type and Crossmatch orders.      Levofloxacin Other (See Comments) and Muscle Pain (Myalgia)     Pt prescribed ciprofloxacin in Jan 2018 (no rxn documented)         Morphine Other (See Comments)     hallucinations     Nitrofurantoin Nausea and Vomiting     Norvasc [Amlodipine Besylate] Swelling     Leg swelling       Quinapril Other (See Comments) and Unknown     Hypertension. 3.29.18 - Takes and tolerates lisinopril           Problem List:    Patient Active Problem List    Diagnosis Date Noted     Coronary artery disease of native heart with stable angina pectoris, unspecified vessel or lesion type (H) 09/24/2019     Priority: Medium     Sialadenitis 09/02/2019     Priority: Medium     Post-operative state 06/10/2019     Priority: Medium     Mass of urethra 05/30/2019     Priority:  "Medium     Gross hematuria 2019     Priority: Medium     Anemia of chronic renal failure, stage 4 (severe) (H) 2019     Priority: Medium     COPD exacerbation (H) 05/10/2019     Priority: Medium     Weakness 05/10/2019     Priority: Medium     Shortness of breath 2019     Priority: Medium     Health Care Home 2019     Priority: Medium     [facility] Care Coordinator  Mirian Weldon MA, LSW  [phone]    [facility] CARE PLAN SUMMARY    Member Name:  Jazmin Clifton    Address:  [address] [place] [id number]    As of 19 (moved to another apt)  Phone: [phone] (home)    :  1932 Date of Assessment: 19  Change in condition visit    Health Plan:  [employer]  Health Plan Number: [id number] Medical Assistance Number: [id number]  Financial Worker:  [facility]   Case #:     [facility] Care Coordinator:  Mirian Weldon MA, LSW CC Phone:  [phone]  CC Fax:  [phone]   FVP Enrollment Date: 19 Case Mix:  D  Rate Cell:  E  Waiver Type: EW     Primary Emergency Contact:  Chi Clifton  Address: [id number] [place] [address]  Mobile Phone: [phone]  Relation: Son    Secondary Emergency Contact: Lucius Clifton  Address: [address] [address] [address]   Home Phone: [phone]  Work Phone: [phone]  Relation: Son Language:  English  :  No   Health Care Agent/POA:   Advanced Directives/Living Will:     Primary Care Clinic/Phone/Fax:  [facility]/(p) [phone], (f) [phone] Primary Dx:  COPD J44.9  Secondary Dx: DDD M51.36  Cognitive Impairment G31.84   Primary Physician:  Junie Estrella   Height:  5' 3\"  Weight:  203 lbs   Specialty Physician:    Audiologist:     Eye Care Provider:   Dental Care Provider:  [facility]   [facility] [phone]   Other:        [facility] CURRENT SERVICES SUMMARY  Equipment owned/DME history:  Member son " purchased electric wheelchair for member;    Member has scooter, lift chair, 3 wheeled walker   SERVICE TYPE/PROVIDER NAME/PHONE AUTH DATE FREQUENCY Units OR $ Amt DESCRIPTION   Medical Transportation: [facility] [phone]  Fax:  4-1-19 thru 3-30-20 review annually/PRN  as needed     Assisted Living: [facility] (Assisted Living) [phone] 24 hr [facility]  Fax:  4-1-19 thru 3-30-20 review annually/PRN daily See RS/AL Tool      Supplies: [facility] [phone]  Fax:  4-1-19 thru 3-30-20 review annually/PRN   monthly 1 pull up per day   1 liner per day  XL pull ups     Regular liners      Abraham ESPARZA    [phone]   5-1-19 thru 3-30-20  as needed  Declined 6-1-19                    Acute kidney injury (H) 01/11/2019     Priority: Medium     Hip pain 10/29/2018     Priority: Medium     Impaired ambulation 10/29/2018     Priority: Medium     Morbid obesity (H) 06/19/2018     Priority: Medium     Multiple closed fractures of metatarsal bone 05/28/2018     Priority: Medium     Right groin mass 05/03/2018     Priority: Medium     Chronic diastolic congestive heart failure (H) 02/02/2018     Priority: Medium     History of stroke 02/02/2018     Priority: Medium     Mild cognitive impairment 09/22/2017     Priority: Medium     S/P carotid endarterectomy 09/06/2017     Priority: Medium     Underwent for symptomatic right carotid artery stenosis        Carotid stenosis, symptomatic w/o infarct, right 08/31/2017     Priority: Medium     Thyroid nodule 08/23/2017     Priority: Medium     Trigger point of extremity 08/23/2017     Priority: Medium     Trochanteric bursitis of right hip 08/23/2017     Priority: Medium     CKD (chronic kidney disease) stage 3, GFR 30-59 ml/min (H) 08/16/2017     Priority: Medium     Transient cerebral ischemia, unspecified type 08/01/2017     Priority: Medium     CVA (cerebral vascular accident) (H) 07/26/2017     Priority: Medium     Chronic  obstructive pulmonary disease, unspecified COPD type (H) 07/25/2017     Priority: Medium     Generalized muscle weakness 07/25/2017     Priority: Medium     Dizziness 07/25/2017     Priority: Medium     Osteoarthritis of right hip, unspecified osteoarthritis type 07/25/2017     Priority: Medium     Alzheimer's dementia without behavioral disturbance (H) 05/12/2017     Priority: Medium     Retention of urine 04/15/2017     Priority: Medium     History of intracranial aneurysm 04/14/2017     Priority: Medium     Postherpetic neuralgia 11/14/2016     Priority: Medium     Umbilical hernia without obstruction and without gangrene 06/03/2016     Priority: Medium     Spinal stenosis of lumbar region 01/11/2016     Priority: Medium     Spondylolisthesis, lumbar region 01/11/2016     Priority: Medium     BPPV (benign paroxysmal positional vertigo) 11/27/2014     Priority: Medium     Dyspepsia 11/27/2014     Priority: Medium     Female stress incontinence 11/27/2014     Priority: Medium     History of bradycardia 11/27/2014     Priority: Medium     History of DVT (deep vein thrombosis) 11/27/2014     Priority: Medium     History of herpes zoster 11/27/2014     Priority: Medium     Constipation 10/20/2012     Priority: Medium     Rectocele 08/31/2012     Priority: Medium     Hip joint replacement status 04/18/2012     Priority: Medium     Osteoarthritis 04/18/2012     Priority: Medium     DDD (degenerative disc disease), lumbar 04/18/2012     Priority: Medium     Mild major depression (H) 04/18/2012     Priority: Medium     Incontinence of urine 04/18/2012     Priority: Medium     Hyperlipidemia 12/07/2011     Priority: Medium     Osteoporosis 10/25/2011     Priority: Medium     S/P laminectomy 10/25/2011     Priority: Medium     CARDIOVASCULAR SCREENING; LDL GOAL LESS THAN 100 10/31/2010     Priority: Medium     Normocytic anemia 02/17/2010     Priority: Medium     CHF (congestive heart failure) (H) 09/17/2008     Priority:  Medium     Diastolic disfunction - ECHP 2008       Restrictive lung disease 09/17/2008     Priority: Medium     PFT 4/2008       Renovascular hypertension 11/09/2006     Priority: Medium     Problem list name updated by automated process. Provider to review       Benign neoplasm of colon 10/04/2006     Priority: Medium     Angiodysplasia - due for repeat 10 years.  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 NO edema. Continue current Therapy.HYPERTENSION: Dynacirc CR 10mg daily initiated today. Will continue adjusting blood pressure medicatins as needed until readings at target.VITAMIN D  DEFICIENCY - Completed therapy, discontinue ergocalciferol.ANEMIA, EPO DEFICIENCY - Hgb 10.2. Not on EPO. Continue observation for now. Recnet Hgb drop due to lumbar laminectomy.  Problem list name updated by automated process. Provider to review       Essential hypertension, benign 05/01/2006     Priority: Medium     Atherosclerosis of renal artery (H)      Priority: Medium     Left renal artery stenosis       Esophageal reflux      Priority: Medium     Gastroesophageal Reflux Disease       Cerebral aneurysm, nonruptured      Priority: Medium     Cerebral Aneurysm - unchanged on rcent MRI.  Seen by neurosurg.  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     Priority: Medium        Past Medical History:    Past Medical History:   Diagnosis Date     ABDOMINAL PAIN GENERALIZED  3/15/2006     Abdominal pain, generalized 3/15/2006     Atherosclerosis of renal artery (H)      BENIGN HYPERTENSION 5/1/2006     Benign neoplasm of scalp and skin of neck      Cerebral aneurysm, nonruptured      Congestive heart failure (H)      Depressive disorder, not elsewhere classified      Esophageal reflux      Female stress incontinence      Gastrointestinal malfunction arising from mental factors      Generalized osteoarthrosis, unspecified site      Herpes zoster dermatitis of eyelid      Insomnia, unspecified      Lumbago      Other chest pain      Other specified cardiac dysrhythmias(427.89)      Rectocele      Unspecified disorder of kidney and ureter      Unspecified essential hypertension        Past Surgical History:    Past Surgical History:   Procedure Laterality Date     CHOLECYSTECTOMY, LAPOROSCOPIC  1997    Cholecystectomy, Laparoscopic     CYSTOSCOPY, BIOPSY URETHRA N/A 6/10/2019    Procedure: Cystoscopy With Biopsy, Removal Of Urethral Lesion;  Surgeon: Yesy Fong MD;  Location: [facility] OR     ENDARTERECTOMY CAROTID Right 8/31/2017    Procedure: ENDARTERECTOMY CAROTID;  RIGHT CAROTID ENDARTERECTOMY WITH EEG;  Surgeon: Hilario Parry MD;  Location: [facility] OR     HYSTERECTOMY, RAYMOND  1982    oophorectomy,RAYMOND     SURGICAL HISTORY OF -       Laminectomy x 3     SURGICAL HISTORY OF -       MCA Aneurysm repair     SURGICAL HISTORY OF -   1996    Bladder suspension (Bladder Repair x2)     SURGICAL HISTORY OF -       Bilateral Hand Surgeries for Arthritis x2     SURGICAL HISTORY OF -       Repair of a Cerebral Aneurysm     URETHROPLASTY N/A 6/10/2019    Procedure: Urethral Reconstruction;  Surgeon: Yesy Fong MD;  Location: [facility] OR       Family History:    Family History   Problem Relation Age of Onset     Cancer Mother         stomache     Cerebrovascular Disease Father      Heart Disease Father      Cancer Brother         lymphoma     Eye Disorder Son      Asthma Son      Diabetes  Son      Gastrointestinal Disease Son         no control over bladder or bowels     C.A.D. No family hx of      Hypertension No family hx of      Breast Cancer No family hx of      Cancer - colorectal No family hx of      Prostate Cancer No family hx of        Social History:  Marital Status:   [5]  Social History     Tobacco Use     Smoking status: Former Smoker     Last attempt to quit: 1992     Years since quittin.8     Smokeless tobacco: Never Used   Substance Use Topics     Alcohol use: Yes     Comment: wine occas.     Drug use: No        Medications:    acetaminophen (TYLENOL) 500 MG tablet  ASPIRIN LOW DOSE 81 MG chewable tablet  atorvastatin (LIPITOR) 40 MG tablet  bisacodyl (DULCOLAX) 10 MG suppository  Blood Glucose Monitoring Suppl CORY  BREO ELLIPTA 100-25 MCG/INH inhaler  calcium carbonate (TUMS) 500 MG chewable tablet  DONEPEZIL HCL PO  DULoxetine (CYMBALTA) 30 MG capsule  furosemide (LASIX) 40 MG tablet  gabapentin (NEURONTIN) 100 MG capsule  Hypromellose (NATURAL BALANCE TEARS OP)  levalbuterol (XOPENEX) 1.25 MG/3ML neb solution  Lidocaine (LIDOCARE) 4 % Patch  lisinopril (PRINIVIL/ZESTRIL) 20 MG tablet  magnesium hydroxide (MILK OF MAGNESIA) 400 MG/5ML suspension  Menthol, Topical Analgesic, (BIOFREEZE) 4 % GEL  metroNIDAZOLE (FLAGYL) 500 MG tablet  NIFEdipine ER (ADALAT CC) 30 MG 24 hr tablet  nystatin (MYCOSTATIN) 534626 UNIT/GM external powder  OMEPRAZOLE PO  ondansetron (ZOFRAN) 4 MG tablet  oxyCODONE (OXYCONTIN) 10 MG 12 hr tablet  oxyCODONE (ROXICODONE) 5 MG tablet  senna-docusate (SENOKOT-S/PERICOLACE) 8.6-50 MG tablet  tiZANidine (ZANAFLEX) 2 MG tablet  vitamin D3 (CHOLECALCIFEROL) 1000 units (25 mcg) tablet          Review of Systems  Constitutional:  Negative for fever or recent illness.  Cardiovascular:  Negative for chest pain.  Respiratory:  Negative for cough or shortness of breath.  Gastrointestinal:  Negative for abdominal pain, nausea or vomiting.  Musculoskeletal:  "Positive for left arm pain radiating from mid humerus distally to the wrist and proximally scapula.  No midline neck or back pain.  Neurological:  Negative for headache or weakness.    All others reviewed and are negative.      Physical Exam   BP: 137/75  Pulse: 76  Heart Rate: 76  Temp: 98.9  F (37.2  C)  Resp: 16  Height: 160 cm (5' 3\")  Weight: 87.1 kg (192 lb)  SpO2: 92 %      Physical Exam  Constitutional:  Well developed, well nourished.  Appears nontoxic but uncomfortable resting on the gurney.  HENT:  Normocephalic and atraumatic.  Symmetric in appearance.  Eyes:  Conjunctivae are normal.  Neck:  Neck supple.  Cardiovascular:  No cyanosis.  RRR.  No audible murmurs noted.  Respiratory:  Effort normal without sign of respiratory distress.  CTAB without diminished regions.   Gastrointestinal:  Soft nondistended abdomen.  Nontender and without guarding.  No rigidity or rebound tenderness.  Negative Clifton's sign.  Negative McBurney's point.    Musculoskeletal: Tenderness of palpable mass of distal left humerus, no fracture or bony deformity.  Shoulders are relatively symmetric without significant swelling, effusion, step-off, or acromioclavicular joint separation.  No clavicular deformity or tenderness.  The scapulas appear to be positioned symmetrically and are without tenderness to palpation.  Full sensation of each deltoid region but pain with abduction and flexion of left shoulder.  Negative for left elbow, forearm, wrist or hand tenderness.  Sensation intact of all digits along median, radial, and ulnar nerve distributions.  No cyanosis and capillary refill less than 2 seconds in each digit.  2/4 palpable radial and ulnar pulses.  Neurological:  Patient is alert.  Skin:  Skin is warm and dry.  Psychiatric:  Normal mood and affect.      ED Course        Procedures               Critical Care time:  none                   Medications   0.9% sodium chloride BOLUS (0 mLs Intravenous Stopped 10/18/19 [id number]) "       Assessments & Plan (with Medical Decision Making)   Jazmin Clifton is a 86 year old female who presents to the department complaining of progressive left upper extremity pain, says that she initially suffered the injury a couple of months ago while using a guardrail to lift herself up.  Location of her tender mass would be consistent with biceps tendon rupture but it does not have expected feel to palpation.  Venous and arterial ultrasounds were performed and did not identify vascular abnormality.  Briefly considered CTA of aorta but unlikely suffering an arterial dissection and GFR borderline, instead have ordered a noncontrast CT scan to further evaluate soft tissue musculoskeletal system.  She will be signed out to overnight physician Dr. Zuniga for imaging results follow-up and final disposition, likely return to SNF with primary clinician follow-up if no medical diagnosis is discovered.        Disclaimer:  This note consists of symbols derived from keyboarding, dictation, and/or voice recognition software.  As a result, there may be errors in the script that have gone undetected.  Please consider this when interpreting information found in the chart.        I have reviewed the nursing notes.    I have reviewed the findings, diagnosis, plan and need for follow up with the patient.       Discharge Medication List as of 10/18/2019  4:00 AM          Final diagnoses:   Acute pain of right shoulder       10/17/2019   [facility] EMERGENCY DEPARTMENT     Elia Allen DO  10/18/19 3565

## 2019-10-18 NOTE — PROGRESS NOTES
[facility] Care Coordination Contact  CC received notification of Emergency Room visit.  ER visit occurred on 10/17/19 at [facility] with Dx of shoulder pain.    CC contacted member and left a message requesting a return call.  Member has a follow-up appointment with PCP: Yes: scheduled on 11/1/19  Member has had a change in condition: No  New referrals placed: No  Home Visit Needed: No  Care plan reviewed and updated.  PCP notified of ED visit via EMR.    LIV Almonte, CMC covering for Mirian Fatemeh  [facility]  [phone]

## 2019-10-18 NOTE — ED PROVIDER NOTES
"     Emergency Department Patient Sign-out       Brief HPI:  This is a 86 year old female signed out to me by Dr. Allen .  See initial ED Provider note for details of the presentation.            Significant Events prior to my assuming care: none      Exam:   Patient Vitals for the past 24 hrs:   BP Temp Temp src Pulse Heart Rate Resp SpO2 Height Weight   10/18/19 0215 (!) 157/87 -- -- -- -- -- -- -- --   10/18/19 0145 136/85 -- -- 69 -- 26 96 % -- --   10/18/19 0130 125/63 -- -- 69 -- 28 94 % -- --   10/18/19 0115 132/66 -- -- 69 -- 26 95 % -- --   10/18/19 0100 128/75 -- -- 81 -- 27 96 % -- --   10/18/19 0045 115/69 -- -- 73 72 18 94 % -- --   10/18/19 0030 129/63 -- -- 71 71 16 96 % -- --   10/18/19 0015 116/66 -- -- 70 70 15 96 % -- --   10/18/19 0000 124/71 -- -- 71 71 17 95 % -- --   10/17/19 2315 120/82 -- -- 75 73 24 97 % -- --   10/17/19 2300 (!) 140/71 -- -- 73 74 12 95 % -- --   10/17/19 2245 (!) 146/77 -- -- 74 74 16 96 % -- --   10/17/19 2230 (!) 149/74 -- -- 74 74 15 96 % -- --   10/17/19 2226 -- -- -- -- -- -- 92 % -- --   10/17/19 2225 (!) 147/68 -- -- 74 76 27 (!) 88 % -- --   10/17/19 2218 -- -- -- -- -- -- -- 1.6 m (5' 3\") 87.1 kg (192 lb)   10/17/19 2210 -- -- -- -- -- -- (!) 89 % -- --   10/17/19 2200 137/76 -- -- 77 -- -- 91 % -- --   10/17/19 2145 121/71 -- -- 83 -- -- 92 % -- --   10/17/19 2130 (!) 145/75 -- -- 77 -- -- 91 % -- --   10/17/19 2115 131/61 -- -- 78 -- -- (!) 89 % -- --   10/17/19 2059 -- -- -- 76 -- -- -- -- --   10/17/19 2058 137/75 98.9  F (37.2  C) Oral -- -- 16 92 % -- --           ED RESULTS:   Results for orders placed or performed during the hospital encounter of 10/17/19 (from the past 24 hour(s))   CBC with platelets differential     Status: Abnormal    Collection Time: 10/17/19 10:27 PM   Result Value Ref Range    WBC 6.8 4.0 - 11.0 10e9/L    RBC Count 3.67 (L) 3.8 - 5.2 10e12/L    Hemoglobin 10.5 (L) 11.7 - 15.7 g/dL    Hematocrit 34.4 (L) 35.0 - 47.0 %    MCV 94 78 " - 100 fl    MCH 28.6 26.5 - 33.0 pg    MCHC 30.5 (L) 31.5 - 36.5 g/dL    RDW 13.3 10.0 - 15.0 %    Platelet Count 225 150 - 450 10e9/L    Diff Method Automated Method     % Neutrophils 73.9 %    % Lymphocytes 14.4 %    % Monocytes 8.1 %    % Eosinophils 3.0 %    % Basophils 0.3 %    % Immature Granulocytes 0.3 %    Nucleated RBCs 0 0 /100    Absolute Neutrophil 5.0 1.6 - 8.3 10e9/L    Absolute Lymphocytes 1.0 0.8 - 5.3 10e9/L    Absolute Monocytes 0.6 0.0 - 1.3 10e9/L    Absolute Eosinophils 0.2 0.0 - 0.7 10e9/L    Absolute Basophils 0.0 0.0 - 0.2 10e9/L    Abs Immature Granulocytes 0.0 0 - 0.4 10e9/L    Absolute Nucleated RBC 0.0    Troponin I     Status: None    Collection Time: 10/17/19 10:27 PM   Result Value Ref Range    Troponin I ES <0.015 0.000 - 0.045 ug/L   Comprehensive metabolic panel     Status: Abnormal    Collection Time: 10/17/19 10:27 PM   Result Value Ref Range    Sodium 143 133 - 144 mmol/L    Potassium 3.9 3.4 - 5.3 mmol/L    Chloride 107 94 - 109 mmol/L    Carbon Dioxide 29 20 - 32 mmol/L    Anion Gap 7 3 - 14 mmol/L    Glucose 176 (H) 70 - 99 mg/dL    Urea Nitrogen 35 (H) 7 - 30 mg/dL    Creatinine 1.43 (H) 0.52 - 1.04 mg/dL    GFR Estimate 33 (L) >60 mL/min/[1.73_m2]    GFR Estimate If Black 38 (L) >60 mL/min/[1.73_m2]    Calcium 9.1 8.5 - 10.1 mg/dL    Bilirubin Total 0.2 0.2 - 1.3 mg/dL    Albumin 3.1 (L) 3.4 - 5.0 g/dL    Protein Total 6.3 (L) 6.8 - 8.8 g/dL    Alkaline Phosphatase 92 40 - 150 U/L    ALT 15 0 - 50 U/L    AST 13 0 - 45 U/L   US Upper Extremity Venous Duplex Left     Status: None (Preliminary result)    Collection Time: 10/18/19  1:19 AM    Narrative    ULTRASOUND VENOUS LEFT UPPER EXTREMITY WITH DOPPLER  10/18/2019 1:19  AM     HISTORY: Painful mass along the left humerus.    COMPARISON: 9/9/2019.    TECHNIQUE: Spectral waveform and color Doppler evaluation were  performed.    FINDINGS: Normal compressibility of the left axillary, cephalic,  basilic, brachial, radial and  ulnar veins. Unremarkable Doppler  waveform evaluation of the left internal jugular, subclavian,  axillary, cephalic, basilic and brachial veins. A 1.5 x 1.4 x 0.4 cm  hypoechoic structure is present in the deep soft tissues of the mid  left humerus, corresponding to the site of a lump.      Impression    IMPRESSION:  1. No evidence of thrombus in the major veins of the left upper  extremity.  2. A very small 1.5 x 1.4 x 0.6 cm hypoechoic structure is present in  the deep soft tissues of the mid left humerus, corresponding to the  site of palpable lump. On the comparison study, a much larger 6.7 x  2.7 x 1.9 cm fluid collection was present in this location. This could  represent a resolving hematoma or seroma.   Chest CT w/o contrast     Status: None (Preliminary result)    Collection Time: 10/18/19  2:14 AM    Narrative    CT CHEST WITHOUT CONTRAST  10/18/2019 2:14 AM     HISTORY: Left upper extremity, shoulder and scapular pain.    COMPARISON: 2/20/2019.    TECHNIQUE: Without intravenous contrast, helical sections were  acquired through the lungs. Coronal reconstructions were generated.  Radiation dose for this scan was reduced using automated exposure  control, adjustment of the mA and/or kV according to the patient's  size, or iterative reconstruction technique.    FINDINGS: Small calcified granuloma in the left upper lobe. 2 cm  patchy ground-glass opacity in the anterior lateral aspect of the  superior segment of the left lower lobe (series 3 image 21), unchanged  since 2/20/2019. This is nonspecific, but most likely represents  scarring. A few linear opacities in bilateral lung bases likely  represent atelectasis or scarring. No pleural or pericardial effusion.  No enlarged lymph nodes in the chest. Atherosclerotic calcification in  the thoracic aorta and coronary arteries. Mild cardiomegaly. Small  bilateral shoulder joint effusions.    Scan through the upper abdomen is significant for several  calcified  granulomas in the spleen and a few small lesions in both kidneys.  There is a dominant 2 cm soft tissue attenuation exophytic lesion in  the interpolar region of the right kidney (series 2 image 59),  unchanged in size. Mild diffuse left renal atrophy.      Impression    IMPRESSION:  1. No acute abnormality is identified in the chest.  2. A few indeterminate bilateral renal lesions, the largest a 2 cm  soft tissue attenuation right renal nodule. These could represent  complex cysts or solid lesions. Renal cell carcinoma cannot be  excluded. If clinically relevant, a renal mass protocol MR could be  considered for further evaluation.       ED MEDICATIONS:   Medications   HYDROmorphone (PF) (DILAUDID) injection 0.3 mg (0.3 mg Intravenous Given 10/18/19 0114)   iopamidol (ISOVUE-370) solution 75 mL (has no administration in time range)   sodium chloride 0.9 % bag 500mL for CT scan flush use (has no administration in time range)   iopamidol (ISOVUE-370) solution 100 mL (has no administration in time range)   sodium chloride 0.9 % bag 500mL for CT scan flush use (has no administration in time range)   0.9% sodium chloride BOLUS (500 mLs Intravenous New Bag 10/18/19 0114)         Impression:  No diagnosis found.    Plan:    Pending studies include CT chest.      2:56 AM: CT negative for acute abnormality. Patient re-assessed.  Patient reports no pain currently.  Discussed reassuring results suggesting no dangerous cause of her symptoms.  She does continue to report some of her symptoms were previously worsened with movement of the neck so cervical radiculopathy may be a component of her pain which, given the absence of neurologic findings currently including no pain, numbness, tingling, or weakness, it is reasonable to be worked up as an outpatient.      MD Efrain Huang Christopher James, MD  10/18/19 0400

## 2019-10-18 NOTE — ED NOTES
Pt 02 sats dropped to 88-89% on room air, after giving 0.3mg dilaudid. Put on 2 liters via nasal cannula

## 2019-10-19 DIAGNOSIS — M81.0 OSTEOPOROSIS WITHOUT CURRENT PATHOLOGICAL FRACTURE, UNSPECIFIED OSTEOPOROSIS TYPE: ICD-10-CM

## 2019-10-19 DIAGNOSIS — I10 ESSENTIAL HYPERTENSION: Primary | ICD-10-CM

## 2019-10-21 RX ORDER — NIFEDIPINE 30 MG/1
TABLET, EXTENDED RELEASE ORAL
Qty: 31 TABLET | Refills: 98 | Status: ON HOLD | OUTPATIENT
Start: 2019-10-21 | End: 2019-01-01

## 2019-10-21 RX ORDER — CHOLECALCIFEROL (VITAMIN D3) 25 MCG
TABLET ORAL
Qty: 31 TABLET | Refills: 98 | Status: SHIPPED | OUTPATIENT
Start: 2019-10-21 | End: 2020-01-01

## 2019-11-01 NOTE — PROGRESS NOTES
[facility]    11/1/2019      Chief complaint: low back and right lateral hip pain    Interval history:  Jazmin Clifton is a 86 year old female is known to me for Pain of right hip joint pain  Chrornic bilateral low back pain without sciatica   H/O lumbosacral spine surgery  Lumbar facet joint pain  Greater trochanteric bursitis of both hips  Chronic pain syndrome  Chronic opiate use in the form of oxycodone 25mg/day which is equal to 37mg OME (oral morphine equivalent, also known as morphine milligram equivalent-MME)     .    Recommendations/plan at the last visit on 6/18/2019  included:  1. Physical Therapy:  None at present  2. Clinical Health Psychologist: None at present   3. Diagnostic Studies:  None at present  4. Medication Management:    1. No changes to medication.  5. Further procedures recommended: Patient to schedule a right greater trochanteric bursal injection with Dr. Georgette Woodruff, office to contact patient.  6. Recommendations to PCP: See above  7. Follow up: 6-8 weeks      Since her last visit, Jazmin Clifton reports:  that her pain is constant and worsening with sharp pain in her left arm.  -Patient strained her left arm muscle and also fell in October. She has bruising that is makes it exretemely painful to dress or remove her clothing.  -She suffers from chronic left shoulder pain and experiences tremors periodically.  -She has constant nagging pain in her right lower back radiating down the hip laterally down her leg and into her groin area. She states lumbar epidural steroid injection was slightly helpful. Her groin area is very painful which is more painful when her bladder is full.    At this point, the patient's participation with our multidisciplinary team includes:  The patient has been compliant with the program.  PT - none  Health Psych - none      Pain scores:  Pain intensity on average is 6 on a scale of 0-10.    Range is 5-10/10.   Pain right now is 5/10.  Pain is  "described as aching, unbearable, tiring, miserable,nagging\".    Pain is constant in nature    Current pain-related medication treatments include:  -Tylenol Extra Strength 500mg may take 2 tabs TID (somewhat helpful)  -ASA 81mg every day  -Cymbalta 30mg capsules, take 60mg in the AM and 30mg in the PM for daily dose of 90mg/day (somewhat helpful)  -gabapentin 200mg BID (Unsure)  -4% lidocaine patches PRN (helpful)  -oxycodone 5mg take TID and 2 tablets/day PRN max of 5 tablets/day ( 5 tablets/day helpful)  -tizanidine 2mg TID PRN (2 tablets/day helpful)     Other pertinent medications:  -prednisone 20mg for 3 days from 5/13-5/16/2019 (helpful for COPD)  -Senna-docusate PRN      Previous medication treatments included:  OPIATES: Oxycodone (helpful), Norco (not helpful), Morphine (allergy)  NSAIDS: Aleve (helpful)  MUSCLE RELAXANTS: Tizanidine (helpful),   ANTI-MIGRAINE MEDS: None  ANTI-DEPRESSANTS: Cymbalta (helpful),   SLEEP AIDS: None  ANTI-CONVULSANTS: gabapentin (unsure),   TOPICALS: Lidocaine 4% patches (helpful),   Other meds: Tylenol (not helpful), Prednisone         Other treatments have included:  Jazmin Clifton has been seen at a pain clinic in the past.    PT: Tried it, not helpful (rehab PT)  Chiropractic care: Tried it, made it worse  Acupuncture: None  TENs Unit: None     Injections:   5/24/2019 Right hip joint injection with Dr. Valentin Woodruff (very helpful for 10 days)  -6/27/19 right greater trochanteric bursa injection with Dr. Georgette Woodruff (somewhat helpful)  -7/30/2019 lumbar transforaminal HENRIQUE at L2-3 and L3-4 on the right with Dr. Georgette Woodruff (slightly helpful)     Surgeries: Tried it, helped. Previous surgery was not helpful.  6/10/2019 Cystoscopy with biopsy of urethral lesion.     THE 4 A's OF OPIOID MAINTENANCE ANALGESIA    Analgesia: helpful    Activity: very little activity, essentially wheelchair bound    Adverse effects: none    Adherence to Rx protocol: yes      Side Effects: no side " effect  Patient is using the medication as prescribed: YES    Medications:  Current Outpatient Medications   Medication Sig Dispense Refill     acetaminophen (TYLENOL) 500 MG tablet Take 1,000 mg by mouth 3 times daily       ASPIRIN LOW DOSE 81 MG chewable tablet CHEW AND SWALLOW ONE TABLET BY MOUTH ONCE DAILY 30 tablet 11     atorvastatin (LIPITOR) 40 MG tablet TAKE 1 TABLET BY MOUTH ONCE DAILY 30 tablet 98     bisacodyl (DULCOLAX) 10 MG suppository Place 10 mg rectally daily as needed for constipation       Blood Glucose Monitoring Suppl [facility] 2 times daily Call nurse for further directions if blood glucose is less than 80 or greater than 250       BREO ELLIPTA 100-25 MCG/INH inhaler INHALE 1 PUFF BY MOUTH ONCE DAILY 1 Inhaler 11     calcium carbonate (TUMS) 500 MG chewable tablet Take 1 chew tab by mouth every hour as needed for heartburn       DONEPEZIL HCL PO Take 5 mg by mouth At Bedtime        DULoxetine (CYMBALTA) 30 MG capsule Take 2 capsules (60 mg) by mouth daily AND 1 capsule (30 mg) At Bedtime. 90 capsule 11     furosemide (LASIX) 40 MG tablet Take 1 tablet (40 mg) by mouth 2 times daily 60 tablet 11     gabapentin (NEURONTIN) 100 MG capsule Take 2 capsules (200 mg) by mouth 2 times daily 120 capsule 11     Hypromellose (NATURAL BALANCE TEARS OP) Place 1 drop into both eyes every 6 hours as needed (to both eyes)        levalbuterol (XOPENEX) 1.25 MG/3ML neb solution Take 1 ampule by nebulization every 4 hours as needed for shortness of breath / dyspnea or wheezing And every 4 hours PRN       Lidocaine (LIDOCARE) 4 % Patch Place 1 patch onto the skin daily as needed To desired area (shoulder or hip). On for 12hrs, off for 12hrs       lisinopril (PRINIVIL/ZESTRIL) 20 MG tablet Take 1 tablet (20 mg) by mouth 2 times daily 62 tablet 98     magnesium hydroxide (MILK OF MAGNESIA) 400 MG/5ML suspension Take 30 mLs by mouth daily as needed for constipation or heartburn       Menthol, Topical Analgesic,  (BIOFREEZE) 4 % GEL Externally apply topically 4 times daily as needed To left shoulder and right hip       metroNIDAZOLE (FLAGYL) 500 MG tablet Take 500 mg by mouth 3 times daily       NIFEdipine ER (ADALAT CC) 30 MG 24 hr tablet Take 30 mg by mouth daily       NIFEdipine ER OSMOTIC (PROCARDIA XL) 30 MG 24 hr tablet TAKE 1 TABLET BY MOUTH ONCE DAILY 31 tablet 98     nystatin (MYCOSTATIN) 605323 UNIT/GM external powder APPLY TOPICALLY TO ABDOMEN FOLDS, GROIN, AND BREASTS TWICE DAILY AS NEEDED 60 g 97     OMEPRAZOLE PO Take 40 mg by mouth every morning       ondansetron (ZOFRAN) 4 MG tablet Take 4 mg by mouth every 6 hours as needed for nausea       oxyCODONE (OXYCONTIN) 10 MG 12 hr tablet Take 1 tablet (10 mg) by mouth every 12 hours maximum 2 tablet(s) per day 60 tablet 0     oxyCODONE (ROXICODONE) 5 MG tablet Take 1 tablet (5 mg) by mouth daily as needed for severe pain 30 tablet 0     OXYCONTIN 10 MG 12 hr tablet TAKE ONE TABLET BY MOUTH EVERY 12 HOURS (MAXIMUM 2 TAB(S) PER DAY) 60 tablet 0     senna-docusate (SENOKOT-S/PERICOLACE) 8.6-50 MG tablet Take 1 tablet by mouth 2 times daily as needed for constipation        tiZANidine (ZANAFLEX) 2 MG tablet TAKE 1 TABLET BY MOUTH THREE TIMES DAILY AS NEEDED 12 tablet 0     VITAMIN D3 1000 units tablet TAKE 1 TABLET BY MOUTH ONCE DAILY 31 tablet 98       Medical History: any changes in medical history since they were last seen? No    Social History:   Home situation: , has six adult children, and lives in residential facility   Occupation/Schooling: Homemaker   Tobacco use: Quit many years ago.  Alcohol use: None  Drug use: None  History of chemical dependency treatment: None      Review of Systems:  ROS is positive as per HPI as well as for hearing loss, swellling in feet, HTN, nausea, vomiting, joint pain, arthritis, back pain, weakness, anxiety and is negative for fevers, chills, sweats, or constipation, diarrhea.    This document serves as a record of the  services and decisions personally performed and made by Mirian HERNANDEZ. It was created on her behalf by Xenia Naqvi, a trained medical scribe. The creation of this document is based on the provider's statements to the medical scribe.Xenia Naqvi 11:10 PM November 1, 2019      Physical Exam:  Vital signs: Blood pressure (!) 161/73, pulse 77.    Behavioral observations:  Awake, alert, and cooperative    Gait:  Not ambulatory. She uses an electric wheelchair to get around    Musculoskeletal exam:    Moves slowly in wheelchair, strength grossly equal throughout, and she has bilateral tenderness over the right greater trochanter.   SI joints are  tender  Piriformis are  tender.    Groin: right sided tender    Neuro exam:  SILT in all extremities     Skin/vascular/autonomic:  No suspicious lesions on exposed skin.      Other:  None      [place] Prescription Monitoring Program:  Reviewed [facility] June 26, 2019- no concerning fills. Managed by PCP  Mirian HERNANDEZ, RN CNP, FNP  [facility]        DIRE Score for selecting candidates for long term opioid analgesia for chronic pain:  Diagnosis  2  Intractablility  1-2  Risk    Psychological health  2    Chemical health  2    Reliability  2    Social support  1-2  Efficacy  2    Total DIRE Score = 12-14. Note that   7-13 predicts poor outcome (compliance and efficacy) from opioid prescribing; 14-21 predicts good outcome (compliance and efficacy)  from opioid prescribing.      Assessment:   1. Chronic right hip pain  2. Greater trochanteric bursitis of right hip  3. Primary osteoarthritis of right hip  4. Hip pain, right  5. H/O lumbosacral spine surgery  6. Chronic pain syndrome  7. Chronic opiate use in the form of oxycodone 25mg/day which is equal to 37mg OME (oral morphine equivalent, also known as morphine milligram equivalent-MME)   8. PMHx includes: Benign HTN 5/1/2006 Abdominal pain, generalized 3/15/2006. Abdominal pain generalized  3/15/2006. Atherosclerosis of renal artery. 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 Cerebral aneurysm, nonruptured. Depressive disorder, not elsewhere classified. GERD. Female stress incontinence. Gastrointestinal malfunction arising from mental factors. Generalized osteoarthrosis, unspecified site. Herpes zoster dermatitis of eyelid. Insomnia, unspecified. Lumbago. Other chest pain. Other specified cardiac dysrhythmias. Rectocele. Unspecified disorder of kidney and ureter. Unspecified essential HTN.  9. PSHx includes: Endarterectomy carotid right 8/31/2017. Cholecystectomy, laparoscopic 1997. Surgical history of bladder suspension 1996. Hysterectomy 1982. Surgical history of laminectomy x3. MCA aneurysm repair. Bilateral hand surgeries for arthritis. Repair of a cerebral aneurysm.           Plan:   1. Physical Therapy:  None at present  2. Clinical Health Psychologist: None at present   3. Diagnostic Studies:  None at present  4. Medication Management:    1. No changes to medication.  5. Further procedures recommended: Patient to schedule a repeat right greater trochanteric bursal injection with Dr. Georgette Woodruff, office to contact patient.  6. Recommendations to PCP: See above  7. Follow up: 8-10 weeks    Total time spent face to face was 40 minutes and more than 50% of face to face time was spent in counseling and/or coordination of care regarding the diagnosis and recommendations above.      The information in this document, created by the medical scribe for me, accurately reflects the services I personally performed and the decisions made by me. I have reviewed and approved this document for accuracy prior to leaving the patient care area.  November 24, 2019        Mirian HERNANDEZ, RN CNP, FNP  [facility]

## 2019-11-01 NOTE — PATIENT INSTRUCTIONS
PLAN:  1. Medication Management:    1. No changes to medication.  2. Further procedures recommended: schedule repeat right hip injection with Dr. Georgette Woodruff, our office will contact you to schedule  3. Consider making an appointment with dermatology regarding the crusty area that won't clear up on the left dorsum of the hand  4. Recommendations to PCP: See above  5. Follow up: 8-10 weeks    ----------------------------------------------------------------  Clinic Number:  [phone]     Call with any questions about your care and for scheduling assistance.     Calls are returned Monday through Friday between 8 AM and 4:30 PM. We usually get back to you within 2 business days depending on the issue/request.    If we are prescribing your medications:    For opioid medication refills, call the clinic or send a [employer] message 7 days in advance.  Please include:    Name of requested medication    Name of the pharmacy.    For non-opioid medications, call your pharmacy directly to request a refill. Please allow 3-4 days to be processed.     Per [place] State Law:    All controlled substance prescriptions must be filled within 30 days of being written.      For those controlled substances allowing refills, pickup must occur within 30 days of last fill.      We believe regular attendance is key to your success in our program!      Any time you are unable to keep your appointment we ask that you call us at least 24 hours in advance to cancel.This will allow us to offer the appointment time to another patient.     Multiple missed appointments may lead to dismissal from the clinic.

## 2019-11-02 PROBLEM — H69.91 ACUTE DYSFUNCTION OF RIGHT EUSTACHIAN TUBE: Status: ACTIVE | Noted: 2019-01-01
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
## 2019-11-02 PROBLEM — S32.599A PUBIC RAMUS FRACTURE (H): Status: ACTIVE | Noted: 2019-01-01

## 2019-11-02 NOTE — ED PROVIDER NOTES
History     Chief Complaint   Patient presents with     Hip Pain     fall, L hip pain, not taking blood thinners     Fall     HPI  Jazmin Clifton is a 86 year old female who presents with a history of hypertension, congestive heart failure, chronic kidney disease stage III, cerebral aneurysm, carotid stenosis status post endarterectomy, prior CVA, osteoporosis, COPD, status post hip replacement presents with a fall that occurred immediately prior to arrival at [facility] where she resides.  She resents after she fell while trying to move her doorstop with her foot at the [facility].  She uses a motorized wheelchair but was shifting to use her walker at the time of her fall.  She lost her balance.  Denies any syncopal symptoms there is no chest pain shortness of breath palpitations.  There is no lightheadedness.  She fell injuring her left ankle striking her back striking her right hip but has had more pain at the lateral and proximal left hip.  Struck her head and has neck pain midline no weakness arms or legs.  No changes in speech or swallowing.  No vomiting.  No recent fever or systemic symptoms.  Received oral oxycodone that is on as needed at [facility]. Transferred here by ambulance.    Allergies:  Allergies   Allergen Reactions     Accupril      Ace Inhibitors Unknown     Accupril     Augmentin Unknown     Blood-Group Specific Substance Other (See Comments)     Patient has a Non-specific antibody. Blood Product orders may be delayed.  Draw one red top and two purple top tubes for ALL Type and Screen/ Type and Crossmatch orders.      Levofloxacin Other (See Comments) and Muscle Pain (Myalgia)     Pt prescribed ciprofloxacin in Jan 2018 (no rxn documented)         Morphine Other (See Comments)     hallucinations     Nitrofurantoin Nausea and Vomiting     Norvasc [Amlodipine Besylate] Swelling     Leg swelling       Quinapril Other (See Comments) and Unknown     Hypertension. 3.29.18 - Takes and tolerates  lisinopril           Problem List:    Patient Active Problem List    Diagnosis Date Noted     Coronary artery disease of native heart with stable angina pectoris, unspecified vessel or lesion type (H) 2019     Priority: Medium     Sialadenitis 2019     Priority: Medium     Post-operative state 06/10/2019     Priority: Medium     Mass of urethra 2019     Priority: Medium     Gross hematuria 2019     Priority: Medium     Anemia of chronic renal failure, stage 4 (severe) (H) 2019     Priority: Medium     COPD exacerbation (H) 05/10/2019     Priority: Medium     Weakness 05/10/2019     Priority: Medium     Shortness of breath 2019     Priority: Medium     Health Care Home 2019     Priority: Medium     [facility] Care Coordinator  Mirian Weldon MA, LSW  [phone]    [facility] CARE PLAN SUMMARY    Member Name:  Jazmin STAHL Etienne    Address:  [address] [address]    As of 19 (moved to another apt)  Phone: [phone] (home)    :  1932 Date of Assessment: 19  Change in condition visit    Health Plan:  [employer]  Health Plan Number: [id number] Medical Assistance Number: [id number]  Financial Worker:  [facility]   Case #:     [facility] Care Coordinator:  Mirian Weldon MA, LSW CC Phone:  [phone]  CC Fax:  [phone]   FVP Enrollment Date: 19 Case Mix:  D  Rate Cell:  E  Waiver Type: EW     Primary Emergency Contact:  Chi Clifton  Address: [address] [place] [address]  Mobile Phone: [phone]  Relation: Son    Secondary Emergency Contact: Lucius Clifton  Address: [address] [place] [address]   Home Phone: [phone]  Work Phone: [phone]  Relation: Son Language:  English  :  No   Health Care Agent/POA:   Advanced Directives/Living Will:     Primary Care Clinic/Phone/Fax:  [facility]/(p) [phone], (f) [phone] Primary Dx:  COPD  "J44.9  Secondary Dx: DDD M51.36  Cognitive Impairment G31.84   Primary Physician:  Junie Estrella   Height:  5' 3\"  Weight:  203 lbs   Specialty Physician:    Audiologist:     Eye Care Provider:   Dental Care Provider:  [facility]   [facility]: Iain Martinez [phone]   Other:        [facility] [employer] CURRENT SERVICES SUMMARY  Equipment owned/DME history:  Member son purchased electric wheelchair for member;    Member has scooter, lift chair, 3 wheeled walker   SERVICE TYPE/PROVIDER NAME/PHONE AUTH DATE FREQUENCY Units OR $ Amt DESCRIPTION   Medical Transportation: [facility] [phone]  Fax:  4-1-19 thru 3-30-20 review annually/PRN  as needed     Assisted Living: [facility] (Assisted Living) [phone] 24 hr [facility]  Fax:  4-1-19 thru 3-30-20 review annually/PRN daily See RS/AL Tool      Supplies: [facility] [phone]  Fax:  4-1-19 thru 3-30-20 review annually/PRN   monthly 1 pull up per day   1 liner per day  XL pull ups     Regular liners      Abraham ESPARZA    [phone]   5-1-19 thru 3-30-20  as needed  Declined 6-1-19                    Acute kidney injury (H) 01/11/2019     Priority: Medium     Hip pain 10/29/2018     Priority: Medium     Impaired ambulation 10/29/2018     Priority: Medium     Morbid obesity (H) 06/19/2018     Priority: Medium     Multiple closed fractures of metatarsal bone 05/28/2018     Priority: Medium     Right groin mass 05/03/2018     Priority: Medium     Chronic diastolic congestive heart failure (H) 02/02/2018     Priority: Medium     History of stroke 02/02/2018     Priority: Medium     Mild cognitive impairment 09/22/2017     Priority: Medium     S/P carotid endarterectomy 09/06/2017     Priority: Medium     Underwent for symptomatic right carotid artery stenosis        Carotid stenosis, symptomatic w/o infarct, right 08/31/2017     Priority: Medium     Thyroid nodule 08/23/2017     Priority: Medium     Trigger " point of extremity 08/23/2017     Priority: Medium     Trochanteric bursitis of right hip 08/23/2017     Priority: Medium     CKD (chronic kidney disease) stage 3, GFR 30-59 ml/min (H) 08/16/2017     Priority: Medium     Transient cerebral ischemia, unspecified type 08/01/2017     Priority: Medium     CVA (cerebral vascular accident) (H) 07/26/2017     Priority: Medium     Chronic obstructive pulmonary disease, unspecified COPD type (H) 07/25/2017     Priority: Medium     Generalized muscle weakness 07/25/2017     Priority: Medium     Dizziness 07/25/2017     Priority: Medium     Osteoarthritis of right hip, unspecified osteoarthritis type 07/25/2017     Priority: Medium     Alzheimer's dementia without behavioral disturbance (H) 05/12/2017     Priority: Medium     Retention of urine 04/15/2017     Priority: Medium     History of intracranial aneurysm 04/14/2017     Priority: Medium     Postherpetic neuralgia 11/14/2016     Priority: Medium     Umbilical hernia without obstruction and without gangrene 06/03/2016     Priority: Medium     Spinal stenosis of lumbar region 01/11/2016     Priority: Medium     Spondylolisthesis, lumbar region 01/11/2016     Priority: Medium     BPPV (benign paroxysmal positional vertigo) 11/27/2014     Priority: Medium     Dyspepsia 11/27/2014     Priority: Medium     Female stress incontinence 11/27/2014     Priority: Medium     History of bradycardia 11/27/2014     Priority: Medium     History of DVT (deep vein thrombosis) 11/27/2014     Priority: Medium     History of herpes zoster 11/27/2014     Priority: Medium     Constipation 10/20/2012     Priority: Medium     Rectocele 08/31/2012     Priority: Medium     Hip joint replacement status 04/18/2012     Priority: Medium     Osteoarthritis 04/18/2012     Priority: Medium     DDD (degenerative disc disease), lumbar 04/18/2012     Priority: Medium     Mild major depression (H) 04/18/2012     Priority: Medium     Incontinence of urine  04/18/2012     Priority: Medium     Hyperlipidemia 12/07/2011     Priority: Medium     Osteoporosis 10/25/2011     Priority: Medium     S/P laminectomy 10/25/2011     Priority: Medium     CARDIOVASCULAR SCREENING; LDL GOAL LESS THAN 100 10/31/2010     Priority: Medium     Normocytic anemia 02/17/2010     Priority: Medium     CHF (congestive heart failure) (H) 09/17/2008     Priority: Medium     Diastolic disfunction - ECHP 2008       Restrictive lung disease 09/17/2008     Priority: Medium     PFT 4/2008       Renovascular hypertension 11/09/2006     Priority: Medium     Problem list name updated by automated process. Provider to review       Benign neoplasm of colon 10/04/2006     Priority: Medium     Angiodysplasia - due for repeat 10 years.  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 NO edema. Continue current Therapy.HYPERTENSION: Dynacirc CR 10mg daily initiated today. Will continue adjusting blood pressure medicatins as needed until readings at target.VITAMIN D  DEFICIENCY - Completed therapy, discontinue ergocalciferol.ANEMIA, EPO DEFICIENCY - Hgb 10.2. Not on EPO. Continue observation for now. Recnet Hgb drop due to lumbar laminectomy.  Problem list name updated by automated process. Provider to review       Essential hypertension, benign 05/01/2006     Priority: Medium     Atherosclerosis of renal artery (H)      Priority: Medium     Left renal artery stenosis       Esophageal reflux      Priority: Medium     Gastroesophageal Reflux Disease       Cerebral aneurysm, nonruptured      Priority: Medium     Cerebral Aneurysm - unchanged on  rcent MRI.  Seen by neurosurg.  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     Priority: Medium        Past Medical History:    Past Medical History:   Diagnosis Date     ABDOMINAL PAIN GENERALIZED 3/15/2006     Abdominal pain, generalized 3/15/2006     Atherosclerosis of renal artery (H)      BENIGN HYPERTENSION 5/1/2006     Benign neoplasm of scalp and skin of neck      Cerebral aneurysm, nonruptured      Congestive heart failure (H)      Depressive disorder, not elsewhere classified      Esophageal reflux      Female stress incontinence      Gastrointestinal malfunction arising from mental factors      Generalized osteoarthrosis, unspecified site      Herpes zoster dermatitis of eyelid      Insomnia, unspecified      Lumbago      Other chest pain      Other specified cardiac dysrhythmias(427.89)      Rectocele      Unspecified disorder of kidney and ureter      Unspecified essential hypertension        Past Surgical History:    Past Surgical History:   Procedure Laterality Date     CHOLECYSTECTOMY, LAPOROSCOPIC  1997    Cholecystectomy, Laparoscopic     CYSTOSCOPY, BIOPSY URETHRA N/A 6/10/2019    Procedure: Cystoscopy With Biopsy, Removal Of Urethral Lesion;  Surgeon: Yesy Fong MD;  Location: [facility] OR     ENDARTERECTOMY CAROTID Right 8/31/2017    Procedure: ENDARTERECTOMY CAROTID;  RIGHT CAROTID ENDARTERECTOMY WITH EEG;  Surgeon: Hilario Parry MD;  Location: [facility] OR     HYSTERECTOMY, RAYMOND  1982    oophorectomy,RAYMOND     SURGICAL HISTORY OF -       Laminectomy x 3     SURGICAL HISTORY OF -       MCA Aneurysm repair     SURGICAL HISTORY OF -   1996    Bladder suspension (Bladder Repair x2)     SURGICAL HISTORY OF -       Bilateral Hand Surgeries for Arthritis x2     SURGICAL HISTORY OF -       Repair of a Cerebral Aneurysm      URETHROPLASTY N/A 6/10/2019    Procedure: Urethral Reconstruction;  Surgeon: Yesy Fogn MD;  Location: [facility] OR       Family History:    Family History   Problem Relation Age of Onset     Cancer Mother         stomache     Cerebrovascular Disease Father      Heart Disease Father      Cancer Brother         lymphoma     Eye Disorder Son      Asthma Son      Diabetes Son      Gastrointestinal Disease Son         no control over bladder or bowels     C.A.D. No family hx of      Hypertension No family hx of      Breast Cancer No family hx of      Cancer - colorectal No family hx of      Prostate Cancer No family hx of        Social History:  Marital Status:   [5]  Social History     Tobacco Use     Smoking status: Former Smoker     Last attempt to quit: 1992     Years since quittin.8     Smokeless tobacco: Never Used   Substance Use Topics     Alcohol use: Yes     Comment: wine occas.     Drug use: No        Medications:    acetaminophen (TYLENOL) 500 MG tablet  ASPIRIN LOW DOSE 81 MG chewable tablet  atorvastatin (LIPITOR) 40 MG tablet  bisacodyl (DULCOLAX) 10 MG suppository  Blood Glucose Monitoring Suppl CORY  BREO ELLIPTA 100-25 MCG/INH inhaler  calcium carbonate (TUMS) 500 MG chewable tablet  DONEPEZIL HCL PO  DULoxetine (CYMBALTA) 30 MG capsule  furosemide (LASIX) 40 MG tablet  gabapentin (NEURONTIN) 100 MG capsule  Hypromellose (NATURAL BALANCE TEARS OP)  levalbuterol (XOPENEX) 1.25 MG/3ML neb solution  Lidocaine (LIDOCARE) 4 % Patch  lisinopril (PRINIVIL/ZESTRIL) 20 MG tablet  magnesium hydroxide (MILK OF MAGNESIA) 400 MG/5ML suspension  Menthol, Topical Analgesic, (BIOFREEZE) 4 % GEL  metroNIDAZOLE (FLAGYL) 500 MG tablet  NIFEdipine ER (ADALAT CC) 30 MG 24 hr tablet  NIFEdipine ER OSMOTIC (PROCARDIA XL) 30 MG 24 hr tablet  nystatin (MYCOSTATIN) 224548 UNIT/GM external powder  OMEPRAZOLE PO  ondansetron (ZOFRAN) 4 MG tablet  oxyCODONE (OXYCONTIN) 10 MG 12 hr tablet  oxyCODONE  (ROXICODONE) 5 MG tablet  OXYCONTIN 10 MG 12 hr tablet  senna-docusate (SENOKOT-S/PERICOLACE) 8.6-50 MG tablet  tiZANidine (ZANAFLEX) 2 MG tablet  VITAMIN D3 1000 units tablet          Review of Systems   Constitutional: Negative for chills, diaphoresis and fever.   HENT: Negative for ear pain, sinus pressure and sore throat.    Eyes: Negative for visual disturbance.   Respiratory: Negative for cough, shortness of breath and wheezing.    Cardiovascular: Negative for chest pain and palpitations.   Gastrointestinal: Negative for abdominal pain, blood in stool, constipation, diarrhea, nausea and vomiting.   Genitourinary: Negative for dysuria, frequency and urgency.   Musculoskeletal: Positive for back pain.   Skin: Negative for rash.   Neurological: Negative for headaches.   All other systems reviewed and are negative.          Physical Exam   BP: (!) 168/74  Heart Rate: 79  Temp: 98  F (36.7  C)  Resp: 18  SpO2: 93 %      Physical Exam  HENT:      Head: Atraumatic.      Right Ear: External ear normal.      Left Ear: External ear normal.      Nose: Nose normal.      Mouth/Throat:      Mouth: Mucous membranes are moist.   Eyes:      Conjunctiva/sclera: Conjunctivae normal.   Neck:      Musculoskeletal: Neck supple.   Cardiovascular:      Rate and Rhythm: Normal rate and regular rhythm.      Pulses: Normal pulses.      Heart sounds: No murmur. No friction rub.   Pulmonary:      Effort: No respiratory distress.      Breath sounds: No stridor. No wheezing or rhonchi.   Abdominal:      General: Abdomen is flat. Bowel sounds are normal. There is no distension.      Palpations: There is no mass.      Tenderness: There is no tenderness. There is no guarding or rebound.      Hernia: No hernia is present.   Musculoskeletal:      Right lower leg: Edema present.      Left lower leg: No edema.   Skin:     Findings: No rash.   Neurological:      General: No focal deficit present.      Mental Status: She is alert.      Cranial  Nerves: No cranial nerve deficit.      Sensory: No sensory deficit.       He does have chronic leg edema often left side more than right.  She wears compression stockings normally.  The head is without obvious raccoon's eyes hillman sign or other signs of trauma.  There is midline tenderness to palpation is more generalized over the cervical spine.  She has no drainage from the ears or nose.  Her neck range of motion is normal.  She has tenderness to palpation along paraspinous muscles of the thoracic spine and especially off to the left side without deformity or ecchymosis here.  She has pain on palpation at the lateral left hip and femur as well as the ankle where she has focal swelling and small ecchymosis lateral aspect.  And anterior as well.  Normal dorsalis pedis posterior tibial pulses.  Normal distal motor function and sensation.  She also has tenderness palpation over the right hip proximally.  There is no deformity here pain on compression.  She has no obvious ecchymosis here.      ED Course        Procedures                 EKG Interpretation:      Interpreted by Roberto Emery MD  EKG done at 1529 hrs. demonstrates a sinus rhythm 79 bpm normal axis.  No ST change.  T wave flattening throughout precordium.  Q waves inferiorly 3 and aVF.  Poor R progression across the precordium.  Normal intervals.  No ectopy.  Impression sinus rhythm 79 bpm with prior anterior septal and inferior MI likely.  T wave flattening.  No other change.  No change from 10/17/2019      Critical Care time:  none                  Results for orders placed or performed during the hospital encounter of 11/02/19 (from the past 24 hour(s))   CBC with platelets differential   Result Value Ref Range    WBC 7.0 4.0 - 11.0 10e9/L    RBC Count 3.70 (L) 3.8 - 5.2 10e12/L    Hemoglobin 10.2 (L) 11.7 - 15.7 g/dL    Hematocrit 33.9 (L) 35.0 - 47.0 %    MCV 92 78 - 100 fl    MCH 27.6 26.5 - 33.0 pg    MCHC 30.1 (L) 31.5 - 36.5 g/dL    RDW 13.5  10.0 - 15.0 %    Platelet Count 225 150 - 450 10e9/L    Diff Method Automated Method     % Neutrophils 75.0 %    % Lymphocytes 13.6 %    % Monocytes 7.8 %    % Eosinophils 2.6 %    % Basophils 0.4 %    % Immature Granulocytes 0.6 %    Nucleated RBCs 0 0 /100    Absolute Neutrophil 5.2 1.6 - 8.3 10e9/L    Absolute Lymphocytes 1.0 0.8 - 5.3 10e9/L    Absolute Monocytes 0.5 0.0 - 1.3 10e9/L    Absolute Eosinophils 0.2 0.0 - 0.7 10e9/L    Absolute Basophils 0.0 0.0 - 0.2 10e9/L    Abs Immature Granulocytes 0.0 0 - 0.4 10e9/L    Absolute Nucleated RBC 0.0    Comprehensive metabolic panel   Result Value Ref Range    Sodium 141 133 - 144 mmol/L    Potassium 4.2 3.4 - 5.3 mmol/L    Chloride 107 94 - 109 mmol/L    Carbon Dioxide 28 20 - 32 mmol/L    Anion Gap 6 3 - 14 mmol/L    Glucose 115 (H) 70 - 99 mg/dL    Urea Nitrogen 28 7 - 30 mg/dL    Creatinine 1.05 (H) 0.52 - 1.04 mg/dL    GFR Estimate 48 (L) >60 mL/min/[1.73_m2]    GFR Estimate If Black 55 (L) >60 mL/min/[1.73_m2]    Calcium 9.7 8.5 - 10.1 mg/dL    Bilirubin Total 0.3 0.2 - 1.3 mg/dL    Albumin 3.1 (L) 3.4 - 5.0 g/dL    Protein Total 6.5 (L) 6.8 - 8.8 g/dL    Alkaline Phosphatase 82 40 - 150 U/L    ALT 15 0 - 50 U/L    AST 18 0 - 45 U/L   Cervical spine CT w/o contrast    Narrative    CT CERVICAL SPINE WITHOUT CONTRAST   11/2/2019 3:10 PM     HISTORY:  Fall, closed head injury, midline cervical tenderness  generally.     TECHNIQUE: Axial images of the cervical spine were obtained without  intravenous contrast. Multiplanar reformations were performed.  Radiation dose for this scan was reduced using automated exposure  control, adjustment of the mA and/or kV according to patient size, or  iterative reconstruction technique.     COMPARISON: None.    FINDINGS: Sagittal images demonstrate minimal degenerative  spondylolisthesis at C3-C4 of approximately 3 mm. Posterior alignment  is otherwise normal. There is multilevel degenerative disc and facet  disease most  advanced at C5-6 and C6-7 where there is mild-to-moderate  central canal stenosis. There is no evidence for fracture. 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 There is mild  rightward lateral convexity to the cervical spine.      Impression    IMPRESSION:  1. No evidence for fracture.  2. Multilevel degenerative changes.    MAYANK PARTIDA MD   Head CT w/o contrast    Narrative    CT SCAN OF THE HEAD WITHOUT CONTRAST   11/2/2019 3:10 PM     HISTORY: Fall, closed head injury, midline cervical tenderness  generally. No anticoagulation.    TECHNIQUE:  Axial images of the head and coronal reformations without  IV contrast material.  Radiation dose for this scan was reduced using  automated exposure control, adjustment of the mA and/or kV according  to patient size, or iterative reconstruction technique.    COMPARISON: 2/19/2019.    FINDINGS: There has been prior right frontal temporal craniotomy for  aneurysm clipping in the right middle cerebral artery region. Mild to  moderate cerebral atrophy is present. Patchy white matter changes are  seen in both hemispheres without mass effect. There is no evidence for  intracranial hemorrhage, mass effect, acute infarct, or skull  fracture.      Impression    IMPRESSION:  1. 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  2. Cerebral atrophy with chronic white matter changes.  3. No evidence for intracranial hemorrhage or any acute process.    MAYANK PARTIDA MD   CT Pelvis Bone wo Contrast    Narrative    CT PELVIS BONE WITHOUT GVAFVYUM58/2/2019 3:10 PM     HISTORY: Fall, bilateral hip pain, status post hip replacement.   Evaluate for pelvic fracture.    TECHNIQUE:  Axial images with reconstructions. No IV contrast.  Radiation dose for this scan was reduced using automated exposure  control, adjustment of the mA and/or kV according to patient size, or  iterative reconstruction technique.    COMPARISON:  9/2/19    FINDINGS:  Osteopenia  suspected. Symphysis pubis degenerative change.  Again there is a multiloculated cystic appearing mass within the  anterior subcutaneous tissues of the right groin and proximal thigh.  This is incompletely imaged and measures over 10.3 cm in length. The  maximal transverse diameter is 8.8 cm. This has fluid density and one  diagnostic possibility would be lymphocele. This has superficial  extension and could be easily aspirated if indicated clinically.  Colonic diverticulosis. Again there is a fat-containing umbilical  hernia. Lumbar fusion hardware. Fracture of the left inferior pubic  ramus without significant displacement.      Impression    IMPRESSION:  1. Fracture of the left inferior pubic ramus.  2. Right anterior groin and proximal thigh cystic appearing mass again  seen.  3. Additional findings discussed above.    MAYDA GOMEZ MD   XR Chest 2 Views    Narrative    CHEST TWO VIEWS  11/2/2019 3:30 PM     HISTORY: fall, struck thoracic back.    COMPARISON: 9/9/2019 chest x-ray      Impression    IMPRESSION: Redemonstrated shallow inspiration, elevated right  hemidiaphragm. Lungs grossly clear. No evidence for pneumothorax.  Postoperative changes lumbar spine. Stable cardiac silhouette, normal  caliber pulmonary vessels.    LADARIUS MURRAY MD   XR Ankle Left G/E 3 Views    Narrative    ANKLE LEFT THREE OR MORE VIEWS   11/2/2019 3:34 PM     HISTORY:  Fall, ankle pain.    FINDINGS: Osteopenia. Soft tissue swelling.      Impression    IMPRESSION: No fracture identified.    XR Femur Left 2 Views    Narrative    FEMUR LEFT TWO VIEWS    11/2/2019 3:34 PM     HISTORY:  Lateral femur pain.    FINDINGS: Osteopenia. Left hip arthroplasty.      Impression    IMPRESSION: There may be a subtle fracture of the inferior pubic  ramus. No femur fracture identified.       Medications - No data to display    Assessments & Plan (with Medical Decision Making)     MDM: Jazmin Clifton is a 86 year old female with multiple  comorbidities and a fall at [facility] today when she tried to move a door stop with her foot while she was using her walker.  She struck her left ankle bilateral hip and pelvis head and has neck pain as well as scapular pain without cardiopulmonary symptoms before or after the event.  She has moderate pain and history of chronic pain for which takes oxycodone was given this and Tylenol before her transfer by ambulance to this facility.  Evaluation will be drawn to evaluate for fracture.  CT head CT cervical spine.  Chest x-ray and EKG done as well prior to fall.  Will also check CT pelvis to evaluate for fracture and will obtain femur on the left side due to a more midshaft tenderness as well.  She also has left ankle tenderness that will be x-rayed as well.     Imaging does demonstrate a inferior ramus fracture left side.  Patient wished to return back to [facility] where she resides on assisted living and I did speak with the nurse there regarding criteria for her to be able to return.  They would be able to accept her back if she could participate in transfers from bed to chair and back.  Patient was given oxycodone orally before this trial and could not successfully make the transfer due to pain.  She therefore will remain in the hospital for observation and Occupational Therapy physical therapy and I discussed this with the patient and with Dr. Lopez who accepts for observation.    I have reviewed the nursing notes.    I have reviewed the findings, diagnosis, plan and need for follow up with the patient.       ED to Inpatient Handoff:    Discussed with Dr. Lopez  Patient accepted for Observation Stay  Pending studies include none  Code Status: Not Addressed           New Prescriptions    No medications on file       Final diagnoses:   Closed fracture of ramus of left pubis, initial encounter (H)   Fall, initial encounter       11/2/2019   [facility] EMERGENCY DEPARTMENT     Roberto Emery MD  11/03/19  7045

## 2019-11-02 NOTE — ED TRIAGE NOTES
Pt here from the [facility] assisted living after pt fell after she kicked the door stop to move it to hold the door open and lost her balance and fell to the ground onto her L hip/femur. Pt has bruise on her L lateral ankle and points to the L femur as being painful and has had a L hip replacement. Pt alert and oriented and remembers the fall. Pt states that she hit her head and the report from the [facility] staff is that she is not on thinners. Pt was given percocet by the nursing staff at [facility].

## 2019-11-02 NOTE — LETTER
"Transition Communication Hand-off for Care Transitions to Next Level of Care Provider    Name: Jazmin Clifton  : 1932  MRN #: [id number]  Primary Care Provider: Junie Estrella  Primary Care MD Name: (Emma)  Primary Clinic: [address] [place] [id number]  Primary Care Clinic Name: ( Geriatrics)  Reason for Hospitalization:  Fall, initial encounter [W19.XXXA]  Closed fracture of ramus of left pubis, initial encounter (H) [S32.592A]  Admit Date/Time: 2019  1:57 PM  Discharge Date: 19  Payor Source: Payor: MEDICA / Plan: MEDICA [employer] [facility] / Product Type: HMO /     Readmission Assessment Measure (LINN) Risk Score/category: elevated    Plan of Care Goals/Milestone Events:   Patient Concerns: ***   Patient Goals:   Short-term ***   Long-term ***   Medical Goals   Short-term ***   Long-term ***         Reason for Communication Hand-off Referral: {CCREASONCMCTNHANDOFFREFERRALCTS:99919}    Discharge Plan:       Concern for non-adherence with plan of care:   Y/N ***  Discharge Needs Assessment:  Needs      Most Recent Value   Skilled Nursing Facility  [facility] [address] [phone], Fax: [phone]   PAS Number  -- [[id number]]          Already enrolled in Tele-monitoring program and name of program:  ***  Follow-up specialty is recommended: {YES / NO:98152::\"Yes\"}    Follow-up plan:    Future Appointments   Date Time Provider Department Center   2019  1:00 PM Honey Espinoza, PT [facility]   2019 10:45 AM Farhat Fonseca MD [facility]       Any outstanding tests or procedures:    Procedures     Future Labs/Procedures    Oxygen - Nasal cannula     Comments:    2 Lpm by nasal cannula to keep O2 sats 92% or greater.          Referrals     Future Labs/Procedures    Occupational Therapy Adult Consult     Comments:    Evaluate and treat as clinically indicated.    Reason:  Physical deconditioning    Physical Therapy Adult " Consult     Comments:    Evaluate and treat as clinically indicated.    Reason:  Physical deconditioning            Key Recommendations:      CAPRICE Higuera    AVS/Discharge Summary is the source of truth; this is a helpful guide for improved communication of patient story

## 2019-11-02 NOTE — PLAN OF CARE
"[place] [facility] [place]    Patient admitted to room 2213 at approximately 1800 via cart from emergency room. Patient was accompanied by transport tech.     Verbal SBAR report received from SEVERO Garza prior to patient arrival.     Patient trasferred to bed via air juanis. Patient alert and oriented X 3. Pain is not well controlled.  Medication(s) being used: narcotic analgesics including oxycodone. 0-10 Pain Scale: 7. Admission vital signs: Blood pressure (!) 155/72, pulse 76, temperature 98  F (36.7  C), temperature source Oral, resp. rate 18, height 1.6 m (5' 3\"), weight 88.1 kg (194 lb 3.6 oz), SpO2 92 %. Patient was oriented to plan of care, call light, bed controls, tv, telephone, bathroom, and visiting hours.     Risk Assessment    The following safety risks were identified during admission: fall and skin. Yellow risk band applied: YES.     Skin Initial Assessment    This writer admitted this patient and completed a full skin assessment and Will score in the Adult PCS flowsheet. Appropriate interventions initiated as needed.     Secondary skin check completed by Char.    Patient ecchymotic, has rash in abdominal/groin folds.         Education    Patient has a [place] to Observation order: Yes  Observation education completed and documented: Yes      Deidre Lemos RN      "

## 2019-11-02 NOTE — ED NOTES
"Patient has  [place] to Observation  order. Patient has been given the Observation brochure -  What does Observation mean to me.\"  Patient has been given the opportunity to ask questions about observation status and their plan of care.      ERIK DERAS RN    "

## 2019-11-03 NOTE — PLAN OF CARE
Discharge Planner PT   Patient plan for discharge: TCU  Current status: Pt transfers sit <> stand with mod A of 2, sit <> supine with max A of 2, pt cried out in pain. Pivot transfer chair to commode and commode to bed with max A of 2 to position commode and bed behind her after attempting to take 2 very small steps. Pt unable to complete pivot due to pain with WBing.  Barriers to return to prior living situation: incr need for assistance, decr mobility, high level of pain, lives alone  Recommendations for discharge: TCU  Rationale for recommendations: unable to transfer or amb independently, lives alone       Entered by: Hayde Irving 11/03/2019 10:11 AM

## 2019-11-03 NOTE — PLAN OF CARE
Discharge Planner OT   Patient plan for discharge: TCU  Current status: Pt requires min to mod of 2 sit to stand and attempting stand pivot transfer with pt unable to advance either LE during transfer with c/o pain. Nursing present for transfer attempting to provide warm hydro pack and pain meds given. Min A UB bathing depend for LB bathing and dressing due to pain.  Barriers to return to prior living situation: pt requires assist with all functional transfers and all BADLs at this time with c/o pain in pelvis  Recommendations for discharge: TCU for increased ind and safety with all transfers and BADLs  Rationale for recommendations: as stated       Entered by: Evelyn Wilburn 11/03/2019 11:46 AM

## 2019-11-03 NOTE — CONSULTS
CARE TRANSITION SOCIAL WORK INITIAL ASSESSMENT:      Met with: Patient.    DATA  Principal Problem:    Pubic ramus fracture (H)  Active Problems:    Essential hypertension, benign    DDD (degenerative disc disease), lumbar    Chronic obstructive pulmonary disease, unspecified COPD type (H)    CKD (chronic kidney disease) stage 3, GFR 30-59 ml/min (H)    Alzheimer's dementia without behavioral disturbance (H)    Anaclitic depression    Chronic diastolic congestive heart failure (H)    Anemia of chronic renal failure, stage 4 (severe) (H)    Coronary artery disease of native heart with stable angina pectoris, unspecified vessel or lesion type (H)    Acute dysfunction of right eustachian tube    Fall       Primary Care Clinic Name: ([facility])  Primary Care MD Name: (Emma)  Contact information and PCP information verified: Yes      ASSESSMENT  Cognitive Status: awake, alert and oriented.       Resources List: Transitional Care, Skilled Nursing Facility, Home Care              Description of Support System: Supportive, Involved   Who is your support system?: Children   Support Assessment: Adequate family and caregiver support, Adequate social supports   Insurance Concerns: No Insurance issues identified  Living Arrangements: assisted living        This writer met with pt introduced self and role. Discussed discharge planning and medicare guidelines in regards to home care and SNF benefits. Pt currently lives at [facility] (phone: [phone] Fax: [phone]). Per PT and OT pt will need TCU, she has an insurance that waives a 3 night stay as long as therapy sees her.     Patient was provided with Medicare certified nursing home list. [facility] (Phone: [phone] Fax: [phone]) and [facility] Phone (Main Phone:[phone] Admissions Phone:[phone] Fax: [phone]). Referrals pending. Pt does NOT want to go to  [facility].        PLAN    TCU referrals pending        Jazmine JUDGE, [facility], [place], [facility] [phone]

## 2019-11-03 NOTE — PROGRESS NOTES
11/03/19 1100   Quick Adds   Quick Adds Certification   Type of Visit Initial Occupational Therapy Evaluation   Living Environment   Lives With alone;facility resident   Living Arrangements assisted living   Home Accessibility no concerns   Functional Level   Ambulation 1-->assistive equipment   Transferring 1-->assistive equipment   Toileting 3-->assistive equipment and person   Bathing 3-->assistive equipment and person   Communication 0-->understands/communicates without difficulty   Swallowing 0-->swallows foods/liquids without difficulty   Cognition 0 - no cognition issues reported   Fall history within last six months yes   Number of times patient has fallen within last six months 1   Which of the above functional risks had a recent onset or change? ambulation;transferring   General Information   Onset of Illness/Injury or Date of Surgery - Date 11/02/19   Referring Physician dr see   Patient/Family Goals Statement to return to [facility]   Additional Occupational Profile Info/Pertinent History of Current Problem Pt is a 86 yr old women from [facility] fell and sustained puvic Ramus fx with increased pain PMH: fall, HTN, DD, COPD, Alzheimers.   Precautions/Limitations fall precautions   Weight-Bearing Status - LUE weight-bearing as tolerated   General Observations Pt in severe pain effecting transfers and all BADLs   Cognitive Status Examination   Orientation place;person   Level of Consciousness alert   Follows Commands (Cognition) does not follow one step commands   Memory impaired   Attention No deficits were identified   Cognitive Comment Pt able to follow simple one step directions   Visual Perception   Visual Perception No deficits were identified   Pain Assessment   Patient Currently in Pain Yes, see Vital Sign flowsheet   Range of Motion (ROM)   ROM Comment limited L sh AROM by pain and recent injury to approx 1/2 range sh flex. R UE WFL and below L sh   Strength   Strength Comments 3-/5 L sh and generalized  4-/5    Hand Strength   Hand Strength Comments fair both hands   Transfer Skill: Bed to Chair/Chair to Bed   Level of [place]: Bed to Chair dependent (less than 25% patients effort)   Physical Assist/Nonphysical Assist: Bed to Chair 2 persons   Weight-Bearing Restrictions weight-bearing as tolerated   Assistive Device - Transfer Skill Bed to Chair Chair to Bed Rehab Eval rolling walker   Transfer Skill: Sit to Stand   Level of [place]: Sit/Stand moderate assist (50% patients effort)   Physical Assist/Nonphysical Assist: Sit/Stand 2 persons   Transfer Skill: Sit to Stand weight-bearing as tolerated   Assistive Device for Transfer: Sit/Stand rolling walker   Transfer Skill: Toilet Transfer   Level of [place]: Toilet maximum assist (25% patients effort)   Physical Assist/Nonphysical Assist: Toilet 2 persons   Toilet Transfer Skill Comments commode transfer   Bathing   Level of [place] maximum assist (25% patients effort)   Upper Body Dressing   Level of [place]: Dress Upper Body minimum assist (75% patients effort)   Lower Body Dressing   Level of [place]: Dress Lower Body dependent (less than 25% patients effort)   Toileting   Level of [place]: Toilet maximum assist (25% patients effort)   Activities of Daily Living Analysis   ADL Comments Pt lives at [facility] able to transfer self   Clinical Impression   Criteria for Skilled Therapeutic Interventions Met yes, treatment indicated   OT Diagnosis fall, pubic ramus fx   Influenced by the following impairments pain   Assessment of Occupational Performance 3-5 Performance Deficits   Identified Performance Deficits LB dressing, LB bathing, toilet tranfers, toileting   Clinical Decision Making (Complexity) Moderate complexity   Therapy Frequency Daily   Anticipated Discharge Disposition Transitional Care Facility   Risks and Benefits of Treatment have been explained. Yes   Patient, Family & other staff in agreement with plan of care Yes  "  Clinical Impression Comments Pt appears to be a good skilled OT care canidate for increased ind and safety of all functional transfers and BADLs   Therapy Certification   Start of Care Date 11/02/19   Certification date from 11/03/19   Certification date to 11/30/19   Medical Diagnosis fall , pubic  ramus fx,    Certification I certify the need for these services furnished under this plan of treatment and while under my care.  (Physician co-signature of this document indicates review and certification of the therapy plan).   [facility] TM \"6 Clicks\"   2016, Trustees of [facility], under license to [employer].  All rights reserved.   6 Clicks Short Forms Daily Activity Inpatient Short Form   [facility] AM-PAC  \"6 Clicks\" Daily Activity Inpatient Short Form   1. Putting on and taking off regular lower body clothing? 1 - Total   2. Bathing (including washing, rinsing, drying)? 2 - A Lot   3. Toileting, which includes using toilet, bedpan or urinal? 1 - Total   4. Putting on and taking off regular upper body clothing? 3 - A Little   5. Taking care of personal grooming such as brushing teeth? 3 - A Little   6. Eating meals? 4 - None   Daily Activity Raw Score (Score out of 24.Lower scores equate to lower levels of function) 14   Total Evaluation Time   Total Evaluation Time (Minutes) 35     "

## 2019-11-03 NOTE — PLAN OF CARE
Pt denied pain at rest. Appeared to sleep well. Purewick in use overnight. Lidoderm patch to right hip. On RA. Afebrile. VSS.

## 2019-11-03 NOTE — PROGRESS NOTES
[facility]    Hospitalist Progress Note    Date of Service (when I saw the patient): 11/03/2019    Assessment & Plan   Jazmin Clifton is a 86 year old female who was admitted on 11/2/2019 after a fall.    Mechanical fall  Pubic ramus fracture (H): This is seems to have been a straightforward accidental fall, unfortunately with pubic ramus fracture.  Unable to transfer in the ED therefore admitted for pain control  - Admit to observation  - Continue OxyContin bid and acetaminophen 1 g tid.  Start Naproxen 250 mg bid.  - Continue prn oxycodone and tizanidine  - Heating pad and ice packs prn  - Will have PT/OT seen in the morning  - Encourage activity with weight bearing as tolerated  - Patient will remain in hospital overnight for further titration of pain medications.    Essential hypertension  Chronic diastolic congestive heart failure  Coronary artery disease of native heart with stable angina pectoris, unspecified vessel or lesion type   - Blood pressure elevated this admission, pain likely contributing.    - Continue PTA low-dose aspirin, Lasix, lisinopril and nifedipine     DDD (degenerative disc disease), lumbar  - Continue PTA OxyContin and oxycodone.  Would likely require more with this acute injury.     Chronic obstructive pulmonary disease, unspecified COPD type  - Continue PTA Breo Ellipta and Xopenex.     CKD (chronic kidney disease) stage 3, GFR 30-59 ml/min  - Stable this admission.     Alzheimer's dementia without behavioral disturbance  - On donepezil.  Continue.     Anaclitic depression  - On Cymbalta, mood appears to be stable.  Continue PTA medication     Anemia of chronic renal failure, stage 3 (severe) (H)  - As well as iron deficiency based on February 2019 blood work.  Stable overall.  Iron supplements ordered.     Acute dysfunction of right eustachian tube  - Flonase ordered for patient      Disposition: Expected discharge in 1-3 days, pending pain control.    DVT  Prophylaxis: Pneumatic Compression Devices  Code Status: Prior    Diaz Benavides  Text Page (7 am to 6 pm)    Interval History   The patient complains of severe pain in her left hip and across her low back with movement.  She also has chronic right pain due to arthritis.    -Data reviewed today: I reviewed all new labs and imaging results over the last 24 hours. I personally reviewed no images or EKG's today.    Physical Exam   Temp: 97.4  F (36.3  C) Temp src: Oral BP: (!) 163/78 Pulse: 72 Heart Rate: 78 Resp: 18 SpO2: 94 % O2 Device: Nasal cannula Oxygen Delivery: 2 LPM  Vitals:    11/02/19 1809   Weight: 88.1 kg (194 lb 3.6 oz)     Vital Signs with Ranges  Temp:  [97.4  F (36.3  C)-98  F (36.7  C)] 97.4  F (36.3  C)  Pulse:  [72-79] 72  Heart Rate:  [78-79] 78  Resp:  [16-18] 18  BP: (150-172)/(68-81) 163/78  SpO2:  [86 %-94 %] 94 %  No intake/output data recorded.    Gen: Well nourished, elderly female, appears uncomfortable  HEENT: Atraumatic, normocephalic  Lungs: Clear to ausculation without wheezes, rhonchi, or rales  Heart: Regular rate and rhythm, no gallops or rubs, no murmurs  GI: Bowel sound normal, no hepatosplenomegaly or masses  Lymph: No lymphadenopathy or edema  Skin: No rashes     Medications       acetaminophen  1,000 mg Oral TID     aspirin  81 mg Oral Daily     atorvastatin  40 mg Oral Daily     donepezil  5 mg Oral At Bedtime     DULoxetine  30 mg Oral At Bedtime    And     DULoxetine  60 mg Oral QAM     ferrous sulfate  325 mg Oral Daily     fluticasone  1 spray Both Nostrils Daily     fluticasone-vilanterol  1 puff Inhalation Daily     furosemide  40 mg Oral BID     gabapentin  200 mg Oral BID     lidocaine   Transdermal Q8H     lidocaine   Transdermal Q24H     lisinopril  20 mg Oral BID     naproxen  250 mg Oral BID w/meals     NIFEdipine ER OSMOTIC  30 mg Oral Daily     omeprazole  40 mg Oral QAM     oxyCODONE  10 mg Oral Q12H     senna-docusate  1 tablet Oral BID     vitamin D3  25  mcg Oral Daily       Data   Recent Labs   Lab 11/02/19  1428   WBC 7.0   HGB 10.2*   MCV 92         POTASSIUM 4.2   CHLORIDE 107   CO2 28   BUN 28   CR 1.05*   ANIONGAP 6   BLAIR 9.7   *   ALBUMIN 3.1*   PROTTOTAL 6.5*   BILITOTAL 0.3   ALKPHOS 82   ALT 15   AST 18       Recent Results (from the past 24 hour(s))   Cervical spine CT w/o contrast    Narrative    CT CERVICAL SPINE WITHOUT CONTRAST   11/2/2019 3:10 PM     HISTORY:  Fall, closed head injury, midline cervical tenderness  generally.     TECHNIQUE: Axial images of the cervical spine were obtained without  intravenous contrast. Multiplanar reformations were performed.  Radiation dose for this scan was reduced using automated exposure  control, adjustment of the mA and/or kV according to patient size, or  iterative reconstruction technique.     COMPARISON: None.    FINDINGS: Sagittal images demonstrate minimal degenerative  spondylolisthesis at C3-C4 of approximately 3 mm. Posterior alignment  is otherwise normal. There is multilevel degenerative disc and facet  disease most advanced at C5-6 and C6-7 where there is mild-to-moderate  central canal stenosis. There is no evidence for fracture. 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 There is mild  rightward lateral convexity to the cervical spine.      Impression    IMPRESSION:  1. No evidence for fracture.  2. Multilevel degenerative changes.    MAYANK PARTIDA MD   Head CT w/o contrast    Narrative    CT SCAN OF THE HEAD WITHOUT CONTRAST   11/2/2019 3:10 PM     HISTORY: Fall, closed head injury, midline cervical tenderness  generally. No anticoagulation.    TECHNIQUE:  Axial images of the head and coronal reformations without  IV contrast material.  Radiation dose for this scan was reduced using  automated exposure control, adjustment of the mA and/or kV according  to patient size, or iterative reconstruction technique.    COMPARISON: 2/19/2019.    FINDINGS: There has  been prior right frontal temporal craniotomy for  aneurysm clipping in the right middle cerebral artery region. Mild to  moderate cerebral atrophy is present. Patchy white matter changes are  seen in both hemispheres without mass effect. There is no evidence for  intracranial hemorrhage, mass effect, acute infarct, or skull  fracture.      Impression    IMPRESSION:  1. 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  2. Cerebral atrophy with chronic white matter changes.  3. No evidence for intracranial hemorrhage or any acute process.    MAYANK PARTIDA MD   CT Pelvis Bone wo Contrast    Narrative    CT PELVIS BONE WITHOUT [id number]/2/2019 3:10 PM     HISTORY: Fall, bilateral hip pain, status post hip replacement.   Evaluate for pelvic fracture.    TECHNIQUE:  Axial images with reconstructions. No IV contrast.  Radiation dose for this scan was reduced using automated exposure  control, adjustment of the mA and/or kV according to patient size, or  iterative reconstruction technique.    COMPARISON:  9/2/19    FINDINGS:  Osteopenia suspected. Symphysis pubis degenerative change.  Again there is a multiloculated cystic appearing mass within the  anterior subcutaneous tissues of the right groin and proximal thigh.  This is incompletely imaged and measures over 10.3 cm in length. The  maximal transverse diameter is 8.8 cm. This has fluid density and one  diagnostic possibility would be lymphocele. This has superficial  extension and could be easily aspirated if indicated clinically.  Colonic diverticulosis. Again there is a fat-containing umbilical  hernia. Lumbar fusion hardware. Fracture of the left inferior pubic  ramus without significant displacement.      Impression    IMPRESSION:  1. Fracture of the left inferior pubic ramus.  2. Right anterior groin and proximal thigh cystic appearing mass again  seen.  3. Additional findings discussed above.    MAYDA GOMEZ MD   XR Chest 2 Views    Narrative     CHEST TWO VIEWS  11/2/2019 3:30 PM     HISTORY: fall, struck thoracic back.    COMPARISON: 9/9/2019 chest x-ray      Impression    IMPRESSION: Redemonstrated shallow inspiration, elevated right  hemidiaphragm. Lungs grossly clear. No evidence for pneumothorax.  Postoperative changes lumbar spine. Stable cardiac silhouette, normal  caliber pulmonary vessels.    LADARIUS MURRAY MD   XR Ankle Left G/E 3 Views    Narrative    ANKLE LEFT THREE OR MORE VIEWS   11/2/2019 3:34 PM     HISTORY:  Fall, ankle pain.    FINDINGS: Osteopenia. Soft tissue swelling.      Impression    IMPRESSION: No fracture identified.     MAYDA GOMEZ MD   XR Femur Left 2 Views    Narrative    FEMUR LEFT TWO VIEWS    11/2/2019 3:34 PM     HISTORY:  Lateral femur pain.    FINDINGS: Osteopenia. Left hip arthroplasty.      Impression    IMPRESSION: There may be a subtle fracture of the inferior pubic  ramus. No femur fracture identified.    MAYDA GOMEZ MD

## 2019-11-03 NOTE — PLAN OF CARE
Heavy assist of two to bedside commode. MD placed an order to discontinue the muniz catheter, this patient did not have a muniz catheter in place. An purewick was used overnight for comfort. Patient has been up to commode since the airwick was removed. Family here and updated with the plan of care. Patient alert and oriented. Taking oxycontin scheduled q12 hours.

## 2019-11-03 NOTE — H&P
"ADMISSION HISTORY AND PHYSICAL  Patient Name: Jazmin Clifton  Address: [address] [address] [address]  Age:86 year old  Sex: female  Admission Date/Time: 11/2/2019  1:57 PM  Admitting provider: Sinai Lopez MD  Hospital Attending Physician: Sinai Lopez*   Primary Care Provider: Junie Estrella    CHIEF COMPLAINT: Fall    HPI:   Patient with a history of carotid stenosis [status post endarterectomy], MCA aneurysm [status post surgical repair], osteoarthritis of the lumbar spine as well as other  sites for which she has chronic pain and is on chronic narcotics, CHF and hypertension, hyperlipidemia, COPD, mild dementia, depression, GERD, chronic constipation, osteoporosis, CKD, CVA amongst other medical problems as outlined below.  She was in her usual state of health until early this afternoon when she got out of her scooter wheelchair and was using her walker to ambulate towards the door that she was trying to keep open.  She try to kick the rubber stopper underneath the door when all of a sudden she fell and hit the back of her head.  She denies any loss of consciousness but had immediate pain over her bilateral hip left ankle and shoulders.  No associated dizziness syncope.  She was brought in here by ambulance for further evaluation.  She reports that since mid treatments in the ED, her pain has gotten better.    REVIEW OF SYSTEMS  No fevers, chills, weight loss, weight gain  The posterior aspect of her head has been hurting since the fall but otherwise denies any chronic headaches. No acute change in vision or hearing though she has been struggling with blurry vision, no URI symptoms though she has rhinitis especially she she eats and feels like her right ear has been \"cracking\" for 1-2 weeks and feeling full.  No chest pain, palpitations, she has chronic dyspnea on exertion as well as feet swelling which are unchanged from baseline, denies orthopnea or post " "nasal drainage though she tends to sleep reclined.  No cough, wheezing.   She has some nausea but this is related to when she phlegm in her throat which is what she ends up spitting up, denies constipation, diarrhea or abdominal pain- she was treated for C difficile last month.  No urinary pain, change in color, frequency or nocturia  No vaginal symptoms of bleeding, discharge or itching  Has chronic pain for which she is on chronic narcotics.  No new rashes  No alterations in thinking, paresthesias, weakness, migraine headache or seizures  No significant change in mood, anxiety level          PAST MEDICAL HISTORY  Past Medical History:   Diagnosis Date     ABDOMINAL PAIN GENERALIZED 3/15/2006    1 month of abdominal pain that bryn radiates into her back and chest.  Pt was hospitilzed 2x for the pain at [facility] --pt hopsitized for 3 days.  Rcords not ab=vailable.  She was told either \" twisted intestine or blockage of bowel.  Pt feels it is the hiatal hernia.  Pt hospitilized again a second time  A month ago.  Ct scans x 2 showed \" nothing.\"  Pt had a barium and xrays.  Pt sa     Abdominal pain, generalized 3/15/2006    1 month of abdominal pain that bryn radiates into her back and chest.  Pt was hospitilzed 2x for the pain at [facility] --pt hopsitized for 3 days.  Rcords not ab=vailable.  She was told either \" twisted intestine or blockage of bowel.  Pt feels it is the hiatal hernia.  Pt hospitilized again a second time  A month ago.  Ct scans x 2 showed \" nothing.\"  Pt had a barium and xrays.  Pt sa     Atherosclerosis of renal artery (H)     Left renal artery stenosis     BENIGN HYPERTENSION 5/1/2006 5/1/2006   Increase catapres to 0.3 mg and decrease clonidine to 0.1 mg daily.  Recheck bp in 1 month.      Benign neoplasm of scalp and skin of neck     Seborrheic keratosis     Cerebral aneurysm, nonruptured     Cerebral Aneurysm     Congestive heart failure (H)      Depressive " disorder, not elsewhere classified     Depression     Esophageal reflux     Gastroesophageal Reflux Disease     Female stress incontinence      Gastrointestinal malfunction arising from mental factors     Dyspepsia     Generalized osteoarthrosis, unspecified site     DJD-chronic back pain     Herpes zoster dermatitis of eyelid      Insomnia, unspecified      Lumbago     Chronic back pain     Other chest pain     Atypical Chest Pain     Other specified cardiac dysrhythmias(427.89)     Bradycardia, improved on lower dose beta blockers      Rectocele     Grade 3     Unspecified disorder of kidney and ureter     Renal insufficiency     Unspecified essential hypertension           PAST SURGICAL HISTORY  Past Surgical History:   Procedure Laterality Date     CHOLECYSTECTOMY, LAPOROSCOPIC  1997    Cholecystectomy, Laparoscopic     CYSTOSCOPY, BIOPSY URETHRA N/A 6/10/2019    Procedure: Cystoscopy With Biopsy, Removal Of Urethral Lesion;  Surgeon: Yesy Fong MD;  Location: [facility] OR     ENDARTERECTOMY CAROTID Right 8/31/2017    Procedure: ENDARTERECTOMY CAROTID;  RIGHT CAROTID ENDARTERECTOMY WITH EEG;  Surgeon: Hilario Parry MD;  Location: [facility] OR     HYSTERECTOMY, RAYMOND  1982    oophorectomy,RAYMOND     SURGICAL HISTORY OF -       Laminectomy x 3     SURGICAL HISTORY OF -       MCA Aneurysm repair     SURGICAL HISTORY OF -   1996    Bladder suspension (Bladder Repair x2)     SURGICAL HISTORY OF -       Bilateral Hand Surgeries for Arthritis x2     SURGICAL HISTORY OF -       Repair of a Cerebral Aneurysm     URETHROPLASTY N/A 6/10/2019    Procedure: Urethral Reconstruction;  Surgeon: Yesy Fong MD;  Location: [facility] OR          MEDICATIONS  No current outpatient medications on file.          ALLERGIES  Accupril; Ace inhibitors; Augmentin; Blood-group specific substance; Levofloxacin; Morphine; Nitrofurantoin; Norvasc [amlodipine besylate]; and Quinapril        FAMILY HISTORY  Family History   Problem  Relation Age of Onset     Cancer Mother         stomache     Cerebrovascular Disease Father      Heart Disease Father      Cancer Brother         lymphoma     Eye Disorder Son      Asthma Son      Diabetes Son      Gastrointestinal Disease Son         no control over bladder or bowels     C.A.D. No family hx of      Hypertension No family hx of      Breast Cancer No family hx of      Cancer - colorectal No family hx of      Prostate Cancer No family hx of           SOCIAL HISTORY  Social History     Socioeconomic History     Marital status:      Spouse name: Not on file     Number of children: Not on file     Years of education: Not on file     Highest education level: Not on file   Occupational History     Not on file   Social Needs     Financial resource strain: Not on file     Food insecurity:     Worry: Not on file     Inability: Not on file     Transportation needs:     Medical: Not on file     Non-medical: Not on file   Tobacco Use     Smoking status: Former Smoker     Last attempt to quit: 1992     Years since quittin.8     Smokeless tobacco: Never Used   Substance and Sexual Activity     Alcohol use: Yes     Comment: wine occas.     Drug use: No     Sexual activity: Never   Lifestyle     Physical activity:     Days per week: Not on file     Minutes per session: Not on file     Stress: Not on file   Relationships     Social connections:     Talks on phone: Not on file     Gets together: Not on file     Attends Church service: Not on file     Active member of club or organization: Not on file     Attends meetings of clubs or organizations: Not on file     Relationship status: Not on file     Intimate partner violence:     Fear of current or ex partner: Not on file     Emotionally abused: Not on file     Physically abused: Not on file     Forced sexual activity: Not on file   Other Topics Concern     Not on file   Social History Narrative     Not on file          PHYSICAL EXAM  Vitals:     11/02/19 2158 11/02/19 2245 11/02/19 2342 11/02/19 2349   BP:   (!) 172/68    Pulse:   77    Resp: 18 18 16    Temp:   97.6  F (36.4  C)    TempSrc:   Oral    SpO2:   (!) 86% 92%   Weight:       Height:         General - Awake and alert, not in any obvious distress. Afebrile, not pale, well hydrated.  Head - Normocephalic, atraumatic.  Eyes - Extraocular movements are intact bilaterally. Sclera and conjunctiva clear. Lids without lesions  Ears - Tympanic membranes clear bilaterally. External canals without lesion.  Mouth - Oropharynx is clear without exudates.  Several missing teeth  Lungs - Clear to auscultation bilaterally, no wheezes, rales or rhonchi.  CV - Regular rate and rhythm, no murmurs, rubs or gallops.  Abdomen - Non-tender, non-distended, positive bowel sounds, no masses, no hepatosplenomegaly. No rebound or guarding.   Upper Extremities - No edema or deformities.   Lower Extremities - No edema.   Skin - Bruising over her forearms and lateral aspect of lower third of left leg. Skin exam is non focal but grossly warm, dry, intact. No rashes or erythema.  Neurologic - Cranial nerves 2-12 intact.  Musculoskeletal - Tenderness billateral lower extremities, hip and shoulders during exam  Psych - Judgment and mental status are clear, patient has reasonable insight. Mood is stable.        LABORATORY  Results for orders placed or performed during the hospital encounter of 11/02/19   CBC with platelets differential     Status: Abnormal   Result Value Ref Range    WBC 7.0 4.0 - 11.0 10e9/L    RBC Count 3.70 (L) 3.8 - 5.2 10e12/L    Hemoglobin 10.2 (L) 11.7 - 15.7 g/dL    Hematocrit 33.9 (L) 35.0 - 47.0 %    MCV 92 78 - 100 fl    MCH 27.6 26.5 - 33.0 pg    MCHC 30.1 (L) 31.5 - 36.5 g/dL    RDW 13.5 10.0 - 15.0 %    Platelet Count 225 150 - 450 10e9/L    Diff Method Automated Method     % Neutrophils 75.0 %    % Lymphocytes 13.6 %    % Monocytes 7.8 %    % Eosinophils 2.6 %    % Basophils 0.4 %    % Immature  Granulocytes 0.6 %    Nucleated RBCs 0 0 /100    Absolute Neutrophil 5.2 1.6 - 8.3 10e9/L    Absolute Lymphocytes 1.0 0.8 - 5.3 10e9/L    Absolute Monocytes 0.5 0.0 - 1.3 10e9/L    Absolute Eosinophils 0.2 0.0 - 0.7 10e9/L    Absolute Basophils 0.0 0.0 - 0.2 10e9/L    Abs Immature Granulocytes 0.0 0 - 0.4 10e9/L    Absolute Nucleated RBC 0.0    Comprehensive metabolic panel     Status: Abnormal   Result Value Ref Range    Sodium 141 133 - 144 mmol/L    Potassium 4.2 3.4 - 5.3 mmol/L    Chloride 107 94 - 109 mmol/L    Carbon Dioxide 28 20 - 32 mmol/L    Anion Gap 6 3 - 14 mmol/L    Glucose 115 (H) 70 - 99 mg/dL    Urea Nitrogen 28 7 - 30 mg/dL    Creatinine 1.05 (H) 0.52 - 1.04 mg/dL    GFR Estimate 48 (L) >60 mL/min/[1.73_m2]    GFR Estimate If Black 55 (L) >60 mL/min/[1.73_m2]    Calcium 9.7 8.5 - 10.1 mg/dL    Bilirubin Total 0.3 0.2 - 1.3 mg/dL    Albumin 3.1 (L) 3.4 - 5.0 g/dL    Protein Total 6.5 (L) 6.8 - 8.8 g/dL    Alkaline Phosphatase 82 40 - 150 U/L    ALT 15 0 - 50 U/L    AST 18 0 - 45 U/L       EKG: No Acute ST/T changes, poor R wave progression.      ASSESSMENT/PLAN    Principal Problem:    Fall/Pubic ramus fracture (H): This is seems to have been a straightforward accidental fall unfortunately with pubic ramus fracture.  Unable to transfer in the ED therefore admitted for pain control  -Admit to observation  -Pain control  -Will have PT/OT seen in the morning  -Has a Maldonado catheter in place, will  leave that for now      Active Problems:    Essential hypertension, benign with Chronic diastolic congestive heart failure (H) and   Coronary artery disease of native heart with stable angina pectoris, unspecified vessel or lesion type (H): Blood pressure elevated this admission, pain likely contributing.  Continue PTA low-dose aspirin, Lasix, lisinopril and nitrofurantoin      DDD (degenerative disc disease), lumbar: PTA OxyContin and oxycodone.  Would likely require more with this acute injury.      Chronic  obstructive pulmonary disease, unspecified COPD type (H): Continue PTA Breo Ellipta and Xopenex.      CKD (chronic kidney disease) stage 3, GFR 30-59 ml/min (H): Stable this admission.      Alzheimer's dementia without behavioral disturbance (H): On donepezil.  Continue.      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  Continue PTA medication      Anemia of chronic renal failure, stage 3 (severe) (H): As well as iron deficiency based on February 2019 blood work.  Stable overall.  Iron supplements ordered.      Acute dysfunction of right eustachian tube: Flonase ordered for patient       Prophylaxis - Mechanical DVT prophylaxis. Early ambulation.  Disposition - 1-2 days, pending better pain control and coming up with a discharge plan.    Attestation:   I have reviewed today's vital signs, notes, medications, labs and imaging.   Amount of time spent on this  H&P note: 45 minutes.   Sinai Lopez MD

## 2019-11-03 NOTE — PROGRESS NOTES
Physical Therapy Evaluation       11/03/19 1000   Quick Adds   Type of Visit Initial PT Evaluation   Living Environment   Lives With alone;facility resident   Living Arrangements assisted living   Home Accessibility no concerns   Functional Level Prior   Ambulation 1-->assistive equipment   Transferring 1-->assistive equipment   Toileting 3-->assistive equipment and person   Bathing 3-->assistive equipment and person   Communication 0-->understands/communicates without difficulty   Swallowing 0-->swallows foods/liquids without difficulty   Cognition 0 - no cognition issues reported   Fall history within last six months yes   Number of times patient has fallen within last six months 1   Which of the above functional risks had a recent onset or change? ambulation;transferring;toileting;bathing;dressing   Prior Functional Level Comment Per patient limited mobilty at baseline. Patient uses  motorized scooter for going down to cafeteria, otherwise will use FWW for very short distance mobility within her room.   General Information   Onset of Illness/Injury or Date of Surgery - Date 11/02/19   Referring Physician Dr Lopez   Patient/Family Goals Statement agreeable to going to TCU   Pertinent History of Current Problem (include personal factors and/or comorbidities that impact the POC) Per H&P:  Patient with a history of carotid stenosis status post endarterectomy, MCA aneurysm status post surgical repair, osteoarthritis of the lumbar spine as well as other  sites for which she has chronic pain and is on chronic narcotics, CHF and hypertension, hyperlipidemia, COPD, mild dementia, depression, GERD, chronic constipation, osteoporosis, CKD, CVA amongst other medical problems as outlined below.  She was in her usual state of health until early this afternoon when she got out of her scooter wheelchair and was using her walker to ambulate towards the door that she was trying to keep open.  She try to kick the rubber stopper  underneath the door when all of a sudden she fell and hit the back of her head.  She denies any loss of consciousness but had immediate pain over her bilateral hip left ankle and shoulders.   Cognitive Status Examination   Orientation person   Level of Consciousness alert   Follows Commands and Answers Questions 100% of the time   Personal Safety and Judgment intact   Pain Assessment   Patient Currently in Pain Yes, see Vital Sign flowsheet  (pelvic pain - unable to rate)   Posture    Posture Forward head position;Protracted shoulders;Kyphosis   Range of Motion (ROM)   ROM Comment WFL except B hip mod limited due to pain   Strength   Strength Comments generally 3-/5 B hips, 4 to 4+/5 knees , 3/5 ankles   Transfer Skills   Transfer Comments Pt transfers sit <> stand with mod A of 2, sit <> supine with max A of 2, pt cried out in pain. Pivot transfer chair to commode and commode to bed with max A of 2 to position commode and bed behind her after attempting to take 2 very small steps. Pt unable to complete pivot due to pain with WBing.   Gait   Gait Comments unable   Balance   Balance Comments good with RW   General Therapy Interventions   Planned Therapy Interventions bed mobility training;gait training;ROM;strengthening;transfer training   Clinical Impression   Criteria for Skilled Therapeutic Intervention yes, treatment indicated   PT Diagnosis impaired mobility and gait following a pelvic fx   Influenced by the following impairments pain and weakness   Functional limitations due to impairments mobility and gait   Clinical Presentation Stable/Uncomplicated   Clinical Presentation Rationale clinical judgement   Clinical Decision Making (Complexity) Low complexity   Therapy Frequency Daily   Predicted Duration of Therapy Intervention (days/wks) 3 days   Anticipated Discharge Disposition Transitional Care Facility   Risk & Benefits of therapy have been explained Yes   Patient, Family & other staff in agreement with  "plan of care Yes   [facility] TM \"6 Clicks\"   2016, Trustees of [facility], under license to [employer].  All rights reserved.   6 Clicks Short Forms Basic Mobility Inpatient Short Form   [facility]  \"6 Clicks\" V.2 Basic Mobility Inpatient Short Form   1. Turning from your back to your side while in a flat bed without using bedrails? 2 - A Lot   2. Moving from lying on your back to sitting on the side of a flat bed without using bedrails? 1 - Total   3. Moving to and from a bed to a chair (including a wheelchair)? 1 - Total   4. Standing up from a chair using your arms (e.g., wheelchair, or bedside chair)? 3 - A Little   5. To walk in hospital room? 1 - Total   6. Climbing 3-5 steps with a railing? 1 - Total   Basic Mobility Raw Score (Score out of 24.Lower scores equate to lower levels of function) 9   Total Evaluation Time   Total Evaluation Time (Minutes) 15     See Care Plan for Goals.    Hayde Irving PT        "

## 2019-11-04 NOTE — PROGRESS NOTES
Had patient off supplemental O2 for about 30 minutes when she was going to attempt to get up to the bathroom earlier, O2 sats had dropped to 82% on RA so O2 placed back on at 2L, rebounded back up to 95% with the 2L per NC.

## 2019-11-04 NOTE — PLAN OF CARE
Discharge Planner OT   Patient plan for discharge: TCU    Current status: Pt declining OOB activity d/t significant pain. States she just attempted to stand and was unable to put weight through LE. Willing to participate in B UE AROM exercises supine in bed- exercises modified d/t limited shoulder ROM. Pt states chronic problems in L shoulder, however recent injury to R shoulder- unable to state injury.     Barriers to return to prior living situation: assist level, pain, weakness     Recommendations for discharge: TCU    Rationale for recommendations: to increase independence with ADLs and related mobility       Entered by: Christine Cardona 11/04/2019 10:40 AM

## 2019-11-04 NOTE — PLAN OF CARE
"Patient had not voided since 1740 last evening, but denied need to void and states she is unable to feel that sensation. No incontinence. Bladder scan volume showed only 90 mL maximum. Straight cath yielded 650 mL immediate return. There appeared to be mucous or sediment in the urine. Sample obtained. Patient declined offer of pain medication at 0330. BP (!) 152/62   Pulse 76   Temp 97.4  F (36.3  C)   Resp 18   Ht 1.6 m (5' 3\")   Wt 88.1 kg (194 lb 3.6 oz)   SpO2 95%   BMI 34.41 kg/m      "

## 2019-11-04 NOTE — PLAN OF CARE
VSS except mildly hypertensive and requiring supplemental O2 per NC @ 2L to maintain sats around 92-95%.  Pain is adequately controlled while patient is resting, however, with repositioning and movement she does have significant pain.  Attempted to ambulate patient this morning but only took 1/2 step and legs gave out on her from pain so she was guided back to the edge of the bed.  Muniz catheter placed just afterwards per MD order to allow patient to rest and heal, and because she hadn't felt the urge to void so was straight catheterized in the night and again this morning didn't feel an urge.  Since the muniz catheter was placed, and ultimately since she was last straight-cathed nearly 12 hours ago, she has had only 225 mL UOP- did web-page Dr. Benavides re: this but no new orders.  Has IVF running @ 100 and is drinking fluids adequately.  Appetite fair.  Repositioned every 2 hours.  Will continue to monitor.

## 2019-11-04 NOTE — PROGRESS NOTES
Spoke with Dhaval Roberson from infectious disease re: discontinuing enteric precautions- has been >30 days since diagnosed and treated for C. Diff without recurrence of symptoms.  States we can discontinue the precautions but she needs to be moved to a clean room and given a clean gown.  Will be moving her to room 2207 shortly.  Patient in agreement with plan.

## 2019-11-04 NOTE — PROGRESS NOTES
Reason for Follow up: DC planning    Anticipated discharge needs: Pt has been accepted at [facility] (Phone: [phone] Fax: [phone]) and will discharge there when her pain is more managed.     PAS-RR    Per DHS regulation, CTS team completed and submitted PAS-RR to [place] [employer] via the [employer]. CTS team advised SNF and they are aware a PAS-RR has been submitted.     CTS team reviewed with pt or health care agent that they may be contacted for a follow up appointment within 10 days of hospital discharge if SNF stay is <30 days. Contact information for [employer] was also provided.     Pt or health care agent verbalized understanding.     PAS-RR # [id number]      Next steps: DC to TCU when stable    Discharge Planner   Discharge Plans in progress: [facility]  Barriers to discharge plan: medical stability  Follow up plan: TCU       Entered by: Jazmine Ruiz 11/04/2019 11:38 AM           Jazmine Ruiz MSW, LICSW, [facility] [phone]

## 2019-11-04 NOTE — PROGRESS NOTES
Patient still without urge to void.  Agreed to get up to BR to attempt- got to sitting on edge of bed when hospitalist came in to round on her.  Wanted to see how she is moving so got her standing at edge of bed with assist x2, she slowly attempted to take a small step and her knees gave out on her in pain so we guided her back onto the bed.  Hospitalist asked that I would place an indwelling catheter for now to let her heal a bit better first.  Maldonado catheter placed without incident.

## 2019-11-04 NOTE — PROGRESS NOTES
[facility]    Hospitalist Progress Note    Date of Service (when I saw the patient): 11/04/2019    Assessment & Plan   Jazmin Clifton is a 86 year old female who was admitted on 11/2/2019 after a fall.    Acute kidney injury stage 1 on CKD stage 3  - Cr increased from 1.05 to 1.60 on 11/4.  - Discontinue lasix, lisinopril, and naproxen on 11/4  - Treated with IVF overnight 11/4 to 11/5  - Cr improved this AM  - Recheck BMP in AM    Mechanical fall  Pubic ramus fracture (H): This is seems to have been a straightforward accidental fall, unfortunately with pubic ramus fracture.  Unable to transfer in the ED therefore admitted for pain control  - Admit to observation  - Continue OxyContin bid and acetaminophen 1 g tid.  Started Naproxen 250 mg bid on 11/3.  - Continue prn oxycodone and tizanidine  - Heating pad and ice packs prn  - PT/OT recommending TCU  - Discontinue naproxen due to [place] on 11/4  - Patient continues to have severe pain in left hip with standing  - Pain is controlled at rest, but severe with movement  - Encourage activity with weight bearing as tolerated  - Patient will remain in hospital due to uncontrolled pain    Essential hypertension  Chronic diastolic congestive heart failure  Coronary artery disease of native heart with stable angina pectoris, unspecified vessel or lesion type   - Blood pressure elevated this admission, pain likely contributing.    - Continue PTA low-dose aspirin and nifedipine  - Stop lisinopril and lasix due to [place]     DDD (degenerative disc disease), lumbar  - Continue PTA OxyContin and oxycodone.  Would likely require more with this acute injury.     Chronic obstructive pulmonary disease, unspecified COPD type  - Continue PTA Breo Ellipta and Xopenex.     CKD (chronic kidney disease) stage 3, GFR 30-59 ml/min  - Stable this admission.     Alzheimer's dementia without behavioral disturbance  - On donepezil.  Continue.     Anaclitic depression  - On  Cymbalta, mood appears to be stable.  Continue PTA medication     Anemia of chronic renal failure, stage 3 (severe) (H)  - As well as iron deficiency based on February 2019 blood work.  Stable overall.  Iron supplements ordered.     Acute dysfunction of right eustachian tube  - Flonase ordered for patient      Disposition: Expected discharge in 1-3 days, pending pain control.    DVT Prophylaxis: Pneumatic Compression Devices  Code Status: Prior    Diaz Benavides  Text Page (7 am to 6 pm)    Interval History   The patient continue to complains of severe pain in her left and right hips with passive movement or standing.  Appetite is good and patient denies nausea or vomiting.    -Data reviewed today: I reviewed all new labs and imaging results over the last 24 hours. I personally reviewed no images or EKG's today.    Physical Exam   Temp: 97.1  F (36.2  C) Temp src: Oral BP: 136/61 Pulse: 69 Heart Rate: 70 Resp: 16 SpO2: 93 % O2 Device: Nasal cannula Oxygen Delivery: 2 LPM  Vitals:    11/02/19 1809   Weight: 88.1 kg (194 lb 3.6 oz)     Vital Signs with Ranges  Temp:  [96.7  F (35.9  C)-98.6  F (37  C)] 97.1  F (36.2  C)  Pulse:  [66-76] 69  Heart Rate:  [70-75] 70  Resp:  [16-18] 16  BP: ()/(41-66) 136/61  SpO2:  [92 %-96 %] 93 %  I/O last 3 completed shifts:  In: 680 [P.O.:680]  Out: 2000 [Urine:2000]    Gen: Well nourished, elderly female, appears uncomfortable  HEENT: Atraumatic, normocephalic  Lungs: Clear to ausculation without wheezes, rhonchi, or rales  Heart: Regular rate and rhythm, no gallops or rubs, no murmurs  GI: Bowel sound normal, no hepatosplenomegaly or masses  Lymph: No lymphadenopathy or edema  Skin: No rashes     Medications     sodium chloride 100 mL/hr at 11/04/19 0829       acetaminophen  1,000 mg Oral TID     aspirin  81 mg Oral Daily     atorvastatin  40 mg Oral Daily     donepezil  5 mg Oral At Bedtime     DULoxetine  30 mg Oral At Bedtime    And     DULoxetine  60 mg Oral QAM      ferrous sulfate  325 mg Oral Daily     fluticasone  1 spray Both Nostrils Daily     fluticasone-vilanterol  1 puff Inhalation Daily     gabapentin  200 mg Oral BID     lidocaine   Transdermal Q8H     lidocaine   Transdermal Q24H     NIFEdipine ER OSMOTIC  30 mg Oral Daily     omeprazole  40 mg Oral QAM     oxyCODONE  10 mg Oral Q12H     senna-docusate  1 tablet Oral BID     vitamin D3  25 mcg Oral Daily       Data   Recent Labs   Lab 11/04/19  0510 11/02/19  1428   WBC 7.1 7.0   HGB 9.1* 10.2*   MCV 91 92    225    141   POTASSIUM 3.9 4.2   CHLORIDE 105 107   CO2 30 28   BUN 37* 28   CR 1.60* 1.05*   ANIONGAP 6 6   BLAIR 9.1 9.7   * 115*   ALBUMIN  --  3.1*   PROTTOTAL  --  6.5*   BILITOTAL  --  0.3   ALKPHOS  --  82   ALT  --  15   AST  --  18       No results found for this or any previous visit (from the past 24 hour(s)).

## 2019-11-04 NOTE — PLAN OF CARE
Discharge Planner PT   Patient plan for discharge: TCU  Current status: declined EOB or OOB activity; with encouragement agreed to LE strengthening which was noted to be in a very limited range due to pain  Barriers to return to prior living situation: assist level, pain, weakness  Recommendations for discharge: TCU  Rationale for recommendations: unable to transfer or ambulate, lives alone       Entered by: Uma Johansen 11/04/2019 2:14 PM

## 2019-11-05 NOTE — PROGRESS NOTES
TRANSITIONS OF CARE (AJAY) LOG   AJAY tasks should be completed by the CC within one (1) business day of notification of each transition. Follow up contact with member is required after return to their usual care setting.  Note:  If CC finds out about the transitions fifteen (15) days or more after the member has returned to their usual care setting, no AJAY log is needed. However, the CC should check in with the member to discuss the transition process, any changes needed to the care plan and document it in a case note.    Member Name:  Jazmin Clifton [place] Name:  [employer] Member ID#: [id number]   [place] [facility] [place] Contact:  Mirian Weldon MA, [facility]Care [facility]: [facility]   Transition Communication Actions from Care Management Contact   Transition #1   Notification Date: 11-5-19 Transition Date:   11-2-19 Transition From: Assisted Living, [facility]      Is this the member s usual care setting?               yes Transition To: [facility]    Transition Type:  Unplanned  Reason for Admission/Comments:  Member had fall at [facility]       Shared CC contact info, care plan/services with receiving setting--Date completed: 11-5-19    Notified PCP of transition--Date completed:  11-2-19     via  EMR   Transition #2   Transition #3  (if applicable)   Notification Date: 11-6-19         Transition To:  Rehabiliation Facility, [facility]   Transition Date: 11-6-19     Transition Type:    Planned  Notified PCP -- Date completed: 11-6-19              Shared CC contact info, care plan/services with receiving setting or, if applicable, home care agency--Date completed:  11-6-19  *Complete additional tasks below, if this transition is a return to usual care setting.      Comments:  CC contacted SW at [facility] asking for updates as needed     Notification Date:  12-3-19        Transition To:  Assisted Living, [facility]   Transition Date:   11-29-19            Transition Type:    Planned  Notified PCP--Date completed: 11-29-19         Shared CC contact info, care plan/services with receiving setting or, if applicable, home care agency--Date completed: 12-3-19      *Complete additional tasks below, if this transition is a return to usual care setting.      Comments:  CC emailed RN team at [facility] asking for update in any concerns or questions about member       *Complete tasks below when the member is discharging TO their usual care setting within one (1) business day of notification.  For situations where the Care Coordinator is notified of the discharge prior to the date of discharge, the Care Coordinator must follow up with the member or designated representative to confirm that discharge actually occurred and discuss required AJAY tasks as outlined in the AJAY Instructions.  (This includes situations where it may be a  new  usual care setting for the member. (i.e., a community member who decides upon permanent nursing home placement following hospitalization and rehab).    Date completed: 12-3-19  Communicated with member or their designated representative about the following:  care transition process; about changes to the member s health status; plan of care updates; education about transitions and how to prevent unplanned transitions/readmissions  Four Pillars for Optimal Transition:    Check  Yes  - if the member, family member and/or SNF/facility staff manages the following:    If  No  provide explanation in the comments section.          [x]  Yes     []  No     Does the member have a follow-up appointment scheduled with primary care or specialist? (Mental health hospitalizations--the appt. should be w/in 7 days)   [x]  Yes     []  No     Can the member manage their medications or is there a system in place to manage medications (e.g. home care set-up)?         [x]  Yes     []  No     Can the member verbalize warning signs and symptoms to watch for and how to  respond?         [x]  Yes     []  No     Does the member use a Personal Health Care Record?  Check  Yes  if visit summary, discharge summary, and/or healthcare summary are being used as a PHR.                                                                                                                                                                                    [x] Yes      [] No      Have you updated the member s care plan?  If  No  provide explanation in comments.   Comments:

## 2019-11-05 NOTE — PLAN OF CARE
Discharge Planner OT   Patient plan for discharge: TCU    Current status: Pt seen this AM. Wanting to use bed pan- encouraged use of commode. Mod A for supine to sit with HOB elevated. Max Ax2 for sit to stand using FWW. Able to take 1/2 step then needing to sit d/t increased pain and fatigue. Ceiling lift used to transfer pt to commode    Barriers to return to prior living situation: assist level, increased pain    Recommendations for discharge: TCU    Rationale for recommendations: to increase independence with ADLs and functional mobility       Entered by: Christine Cardona 11/05/2019 1:22 PM

## 2019-11-05 NOTE — PROGRESS NOTES
11-5-19   CC was notified that member had fall and was in the ED and admitted for pubic fracture.  SW at the [facility] stated that member will plan to be discharge to [facility] on  TCU.  CC will then f/u as needed.   Mirian Weldon MA [facility] Coordinator   [phone]

## 2019-11-05 NOTE — PLAN OF CARE
Patient is A&Ox4. Pt currently on 2LPM of O2 via NC to maintain sats between 93-97%. Patient's pain increases with movement and repositioning but is manageable when at rest. Patient reported reduced pain at bedtime. Pain sufficiently managed with scheduled and PRN oxycodone. Patient prefers taking her meds with room temp applesauce. When administering Cymbalta, a contraindication warning popped because pt's Creatinine clearance is 26. Administered the medication as ordered and notified MD. Patient has ecchymosis and erythema on both arms and a pronounced bruise on her anterior left ankle. Patient has Lido patch in place on her right posterior hip. Maldonado catheter is in and draining urine. PIV is leaky- no erythema, patient denies discomfort, currently infusing sodium chloride 0.9% 100 mL/hr.     Will continue to monitor.    Josephine Ochoa, student nurse, on 11/5/2019 at 12:13 AM    PRN Oxycodone given prior to HS for acute bilateral hip pain rated 6/10; pain decreased at reassessment. Trace BLE edema present. Maldonado is now draining adequate amounts of clear yellow urine.   RN/writer has reviewed all documentation and notes by student nurse, and agrees.  Gamal Bautista RN on 11/5/2019 at 2:12 AM

## 2019-11-05 NOTE — PLAN OF CARE
Discharge Planner PT   Patient plan for discharge: TCU    Current status: Pt encourgaed, but declinedOOB actvity. Particiapted w/ bed side ex only-  AAROM due to weakness/ pain    Barriers to return to prior living situation: assist level    Recommendations for discharge: TCU     Rationale for recommendations: pt unable to transfer, ambulate       Entered by: Honey Espinoza 11/05/2019 1:17 PM

## 2019-11-06 NOTE — DISCHARGE SUMMARY
Discharge Summary    Jazmin Clifton MRN# [id number]   YOB: 1932 Age: 86 year old     Date of Admission:  11/2/2019  Date of Discharge:  11/6/2019  Admitting Physician:  Diaz Benavides MD  Discharge Physician:  Diaz Benavides MD  Discharging Service:  Hospitalist     Primary Provider: Junie Estrella          Admission Diagnoses:   Fall, initial encounter [W19.XXXA]  Closed fracture of ramus of left pubis, initial encounter (H) [S32.592A]         Brief History of Illness:   Jazmin Clifton is a 86 year old female who was admitted for left hip pain.          Hospital Course:     Acute kidney injury stage 1 on CKD stage 3  - Cr increased from 1.05 to 1.60 on 11/4.  - Discontinue lasix, lisinopril, and naproxen on 11/4  - Treated with IVF overnight 11/4 to 11/5  - Cr improved AM of 11/6 to 0.96  - WASHINGTON resolved     Mechanical fall  Pubic ramus fracture (H): This is seems to have been a straightforward accidental fall, unfortunately with pubic ramus fracture.  Unable to transfer in the ED therefore admitted for pain control  - Admit to observation  - Continue OxyContin bid and acetaminophen 1 g tid.    - Started Naproxen 250 mg bid on 11/3, but discontinued to AK  - Continue prn oxycodone and tizanidine  - Heating pad and ice packs prn  - PT/OT recommending TCU  - Discontinue naproxen due to WASHINGTON on 11/4  - Patient continues to have pain in left hip with standing  - Pain is controlled at rest, but worsens with movement  - Encourage activity with weight bearing as tolerated     Essential hypertension  Chronic diastolic congestive heart failure  Coronary artery disease of native heart with stable angina pectoris, unspecified vessel or lesion type   - Blood pressure elevated this admission, pain likely contributing.    - Continue PTA low-dose aspirin and nifedipine  - Stop lisinopril and lasix due to WASHINGTON  - Re-evaluate restarting lasix or lisinopril at TCU     DDD (degenerative disc  "disease), lumbar  - Continue PTA OxyContin and oxycodone.       Chronic obstructive pulmonary disease, unspecified COPD type  - Continue PTA Breo Ellipta and Xopenex.  - No evidence for acute exacerbation     Alzheimer's dementia without behavioral disturbance  - On donepezil.  Continue.     Anaclitic depression  - On Cymbalta, mood appears to be stable.  Continue PTA medication     Anemia of chronic renal failure, stage 3 (severe) (H)  - As well as iron deficiency based on February 2019 blood work.  Stable overall.  Iron supplements ordered.     Acute dysfunction of right eustachian tube  - Flonase ordered for patient     Urinary retention  - Maldonado catheter placed on 11/4 due to urinary retention  - Wean catheter at TCU    Acute hypoxia due to atelectasis  - CXR demonstrates bibasilar atelectasis  - No clinical evidence for acute lung infection or pneumonia  - Requires 2 L via NC to maintain oxygen saturation greater than 88%  - Encourage deep breaths and activity  - Wean oxygen at TCU    Greater than 35 minutes were spent in discharge planning.          Condition on Discharge:         Discharge vitals: Blood pressure (!) 162/70, pulse 76, temperature 98.2  F (36.8  C), temperature source Oral, resp. rate 16, height 1.6 m (5' 3\"), weight 88.1 kg (194 lb 3.6 oz), SpO2 97 %.         Gen: Well nourished, debilitated elderly female, alert and oriented x 3, no acute distressed  HEENT: Atraumatic, normocephalic  Lungs: Faint bibasilar rales without wheezes, rhonchi  Heart: Regular rate and rhythm, no gallops or rubs, no murmurs  GI: Bowel sound normal, no hepatosplenomegaly or masses  Lymph: No lymphadenopathy, 1 + BLE edema  Skin: No rashes          Procedures / Labs / Imaging:   Most Recent 3 CBC's:  Recent Labs   Lab Test 11/06/19 0449 11/05/19 0444 11/04/19  0510   WBC 5.5 5.8 7.1   HGB 8.3* 8.3* 9.1*   MCV 91 92 91    147* 165      Most Recent 3 BMP's:  Recent Labs   Lab Test 11/06/19 0449 11/05/19 0444 " 11/04/19  0510    138 141   POTASSIUM 4.9 4.3 3.9   CHLORIDE 108 107 105   CO2 29 30 30   BUN 32* 35* 37*   CR 0.96 1.47* 1.60*   ANIONGAP 4 1* 6   BLAIR 9.3 8.7 9.1   * 105* 102*     Most Recent 3 Troponin's:  Recent Labs   Lab Test 10/17/19  2227 09/03/19  0529 09/02/19  1037   TROPI <0.015 0.035 0.042     Most Recent 3 INR's:  Recent Labs   Lab Test 05/10/19  1104 02/19/19  0546 07/26/17  1130   INR 1.02 1.09 1.07     Most Recent 2 LFT's:  Recent Labs   Lab Test 11/02/19  1428 10/17/19  2227   AST 18 13   ALT 15 15   ALKPHOS 82 92   BILITOTAL 0.3 0.2     Most Recent Cholesterol Panel:  Recent Labs   Lab Test 08/31/17  1113   CHOL 110   LDL 28   HDL 61   TRIG 104     Most Recent 6 Bacteria Isolates From Any Culture (See EPIC Reports for Culture Details):  Recent Labs   Lab Test 11/04/19  0310 09/01/19  1958 09/01/19  1853 09/01/19  1829 07/18/19  1320 06/06/19  1140   CULT Culture in progress 50,000 to 100,000 colonies/mL  mixed urogenital weston   No growth No growth >100,000 colonies/mL  Coagulase negative Staphylococcus  * <10,000 colonies/mL  mixed urogenital weston  Susceptibility testing not routinely done       Most Recent TSH, T4 and HgbA1c:   Recent Labs   Lab Test 05/10/19  1104 08/31/17  1113   TSH 0.51  --    A1C  --  6.2*     Results for orders placed or performed during the hospital encounter of 11/02/19   XR Chest 2 Views    Narrative    CHEST TWO VIEWS  11/2/2019 3:30 PM     HISTORY: fall, struck thoracic back.    COMPARISON: 9/9/2019 chest x-ray      Impression    IMPRESSION: Redemonstrated shallow inspiration, elevated right  hemidiaphragm. Lungs grossly clear. No evidence for pneumothorax.  Postoperative changes lumbar spine. Stable cardiac silhouette, normal  caliber pulmonary vessels.    LADARIUS MURRAY MD   Head CT w/o contrast    Narrative    CT SCAN OF THE HEAD WITHOUT CONTRAST   11/2/2019 3:10 PM     HISTORY: Fall, closed head injury, midline cervical tenderness  generally. No  anticoagulation.    TECHNIQUE:  Axial images of the head and coronal reformations without  IV contrast material.  Radiation dose for this scan was reduced using  automated exposure control, adjustment of the mA and/or kV according  to patient size, or iterative reconstruction technique.    COMPARISON: 2/19/2019.    FINDINGS: There has been prior right frontal temporal craniotomy for  aneurysm clipping in the right middle cerebral artery region. Mild to  moderate cerebral atrophy is present. Patchy white matter changes are  seen in both hemispheres without mass effect. There is no evidence for  intracranial hemorrhage, mass effect, acute infarct, or skull  fracture.      Impression    IMPRESSION:  1. 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  2. Cerebral atrophy with chronic white matter changes.  3. No evidence for intracranial hemorrhage or any acute process.    MAYANK PATRIDA MD   Cervical spine CT w/o contrast    Narrative    CT CERVICAL SPINE WITHOUT CONTRAST   11/2/2019 3:10 PM     HISTORY:  Fall, closed head injury, midline cervical tenderness  generally.     TECHNIQUE: Axial images of the cervical spine were obtained without  intravenous contrast. Multiplanar reformations were performed.  Radiation dose for this scan was reduced using automated exposure  control, adjustment of the mA and/or kV according to patient size, or  iterative reconstruction technique.     COMPARISON: None.    FINDINGS: Sagittal images demonstrate minimal degenerative  spondylolisthesis at C3-C4 of approximately 3 mm. Posterior alignment  is otherwise normal. There is multilevel degenerative disc and facet  disease most advanced at C5-6 and C6-7 where there is mild-to-moderate  central canal stenosis. There is no evidence for fracture. 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 There is mild  rightward lateral convexity to the cervical spine.      Impression    IMPRESSION:  1. No evidence  for fracture.  2. Multilevel degenerative changes.    MAYANK PARTIDA MD   CT Pelvis Bone wo Contrast    Narrative    CT PELVIS BONE WITHOUT DZXXXXYX97/2/2019 3:10 PM     HISTORY: Fall, bilateral hip pain, status post hip replacement.   Evaluate for pelvic fracture.    TECHNIQUE:  Axial images with reconstructions. No IV contrast.  Radiation dose for this scan was reduced using automated exposure  control, adjustment of the mA and/or kV according to patient size, or  iterative reconstruction technique.    COMPARISON:  9/2/19    FINDINGS:  Osteopenia suspected. Symphysis pubis degenerative change.  Again there is a multiloculated cystic appearing mass within the  anterior subcutaneous tissues of the right groin and proximal thigh.  This is incompletely imaged and measures over 10.3 cm in length. The  maximal transverse diameter is 8.8 cm. This has fluid density and one  diagnostic possibility would be lymphocele. This has superficial  extension and could be easily aspirated if indicated clinically.  Colonic diverticulosis. Again there is a fat-containing umbilical  hernia. Lumbar fusion hardware. Fracture of the left inferior pubic  ramus without significant displacement.      Impression    IMPRESSION:  1. Fracture of the left inferior pubic ramus.  2. Right anterior groin and proximal thigh cystic appearing mass again  seen.  3. Additional findings discussed above.    MAYDA GOMEZ MD   XR Ankle Left G/E 3 Views    Narrative    ANKLE LEFT THREE OR MORE VIEWS   11/2/2019 3:34 PM     HISTORY:  Fall, ankle pain.    FINDINGS: Osteopenia. Soft tissue swelling.      Impression    IMPRESSION: No fracture identified.     MAYDA GOMEZ MD   XR Femur Left 2 Views    Narrative    FEMUR LEFT TWO VIEWS    11/2/2019 3:34 PM     HISTORY:  Lateral femur pain.    FINDINGS: Osteopenia. Left hip arthroplasty.      Impression    IMPRESSION: There may be a subtle fracture of the inferior pubic  ramus. No femur fracture identified.    MAYDA  MD JASON   XR Chest Port 1 View    Narrative    XR CHEST PORT 1 VW 11/5/2019 11:26 AM    HISTORY: Hypoxia.    COMPARISON: 11/2/2019    FINDINGS: Bilateral basilar atelectasis versus consolidation. Upper  lungs are clear. Stable benign granuloma in the left apex. No  pneumothorax. Stable heart size.      Impression    IMPRESSION: Bilateral basilar atelectasis versus consolidation.    JUVENTINO GORDON MD             Medications Prior to Admission:     Medications Prior to Admission   Medication Sig Dispense Refill Last Dose     acetaminophen (TYLENOL) 500 MG tablet Take 1,000 mg by mouth 3 times daily   11/2/2019 at 0800     atorvastatin (LIPITOR) 40 MG tablet TAKE 1 TABLET BY MOUTH ONCE DAILY 30 tablet 98 11/1/2019 at 2000     BREO ELLIPTA 100-25 MCG/INH inhaler INHALE 1 PUFF BY MOUTH ONCE DAILY 1 Inhaler 11 11/2/2019 at 0800     DONEPEZIL HCL PO Take 5 mg by mouth At Bedtime    11/1/2019 at 2000     gabapentin (NEURONTIN) 100 MG capsule Take 2 capsules (200 mg) by mouth 2 times daily 120 capsule 11 11/2/2019 at 0800     Lidocaine (LIDOCARE) 4 % Patch Place 1 patch onto the skin daily as needed To desired area (shoulder or hip). On for 12hrs, off for 12hrs   11/3/2019 at Unknown time     OMEPRAZOLE PO Take 40 mg by mouth every morning   11/2/2019 at 0800     VITAMIN D3 1000 units tablet TAKE 1 TABLET BY MOUTH ONCE DAILY (Patient taking differently: Take 1,000 Units by mouth every morning ) 31 tablet 98 11/2/2019 at 0800     bisacodyl (DULCOLAX) 10 MG suppository Place 10 mg rectally daily as needed for constipation   Unknown at Unknown time     Blood Glucose Monitoring Suppl CORY 2 times daily Call nurse for further directions if blood glucose is less than 80 or greater than 250   Unknown at Unknown time     Hypromellose (NATURAL BALANCE TEARS OP) Place 1 drop into both eyes every 6 hours as needed (to both eyes)    Unknown at Unknown time     levalbuterol (XOPENEX) 1.25 MG/3ML neb solution Take 1 ampule by  nebulization every 4 hours as needed for shortness of breath / dyspnea or wheezing And every 4 hours PRN   Unknown at Unknown time     magnesium hydroxide (MILK OF MAGNESIA) 400 MG/5ML suspension Take 30 mLs by mouth daily as needed for constipation or heartburn   Unknown at Unknown time     Menthol, Topical Analgesic, (BIOFREEZE) 4 % GEL Externally apply topically 4 times daily as needed To left shoulder and right hip   Unknown at Unknown time     ondansetron (ZOFRAN) 4 MG tablet Take 4 mg by mouth every 6 hours as needed for nausea   Unknown at Unknown time     [DISCONTINUED] ASPIRIN LOW DOSE 81 MG chewable tablet CHEW AND SWALLOW ONE TABLET BY MOUTH ONCE DAILY 30 tablet 11 11/2/2019 at 0800     [DISCONTINUED] calcium carbonate (TUMS) 500 MG chewable tablet Take 1 chew tab by mouth every hour as needed for heartburn   Unknown at Unknown time     [DISCONTINUED] DULoxetine (CYMBALTA) 30 MG capsule Take 2 capsules (60 mg) by mouth daily AND 1 capsule (30 mg) At Bedtime. 90 capsule 11 11/2/2019 at 0800     [DISCONTINUED] furosemide (LASIX) 40 MG tablet Take 1 tablet (40 mg) by mouth 2 times daily 60 tablet 11 11/2/2019 at 0800     [DISCONTINUED] lisinopril (PRINIVIL/ZESTRIL) 20 MG tablet Take 1 tablet (20 mg) by mouth 2 times daily 62 tablet 98 11/2/2019 at 0800     [DISCONTINUED] NIFEdipine ER (ADALAT CC) 30 MG 24 hr tablet Take 30 mg by mouth daily Hold if SBP<110   Taking     [DISCONTINUED] NIFEdipine ER OSMOTIC (PROCARDIA XL) 30 MG 24 hr tablet TAKE 1 TABLET BY MOUTH ONCE DAILY 31 tablet 98 11/2/2019 at 0800     [DISCONTINUED] nystatin (MYCOSTATIN) 996372 UNIT/GM external powder APPLY TOPICALLY TO ABDOMEN FOLDS, GROIN, AND BREASTS TWICE DAILY AS NEEDED 60 g 97 11/2/2019 at 0800     [DISCONTINUED] oxyCODONE (ROXICODONE) 5 MG tablet Take 1 tablet (5 mg) by mouth daily as needed for severe pain 30 tablet 0 Unknown at Unknown time     [DISCONTINUED] OXYCONTIN 10 MG 12 hr tablet TAKE ONE TABLET BY MOUTH EVERY 12 HOURS  (MAXIMUM 2 TAB(S) PER DAY) 60 tablet 0 11/2/2019 at 0800     [DISCONTINUED] senna-docusate (SENOKOT-S/PERICOLACE) 8.6-50 MG tablet Take 1 tablet by mouth 2 times daily as needed for constipation    Unknown at Unknown time     [DISCONTINUED] tiZANidine (ZANAFLEX) 2 MG tablet TAKE 1 TABLET BY MOUTH THREE TIMES DAILY AS NEEDED 12 tablet 0 Unknown at Unknown time             Discharge Medications:     Current Discharge Medication List      START taking these medications    Details   ferrous sulfate (FEROSUL) 325 (65 Fe) MG tablet Take 1 tablet (325 mg) by mouth daily    Associated Diagnoses: Anemia of chronic renal failure, stage 4 (severe) (H)      fluticasone (FLONASE) 50 MCG/ACT nasal spray Spray 1 spray into both nostrils daily    Associated Diagnoses: Nasal congestion      miconazole (MICATIN/MICRO GUARD) 2 % external powder Apply topically 2 times daily as needed for other (Intetriginous candidiasis)    Associated Diagnoses: Intertrigo         CONTINUE these medications which have CHANGED    Details   aspirin (ASA) 81 MG chewable tablet Take 1 tablet (81 mg) by mouth daily    Associated Diagnoses: Coronary artery disease of native heart with stable angina pectoris, unspecified vessel or lesion type (H)      calcium carbonate (TUMS) 500 MG chewable tablet Take 1 tablet (500 mg) by mouth 3 times daily    Associated Diagnoses: Closed fracture of ramus of left pubis, initial encounter (H); DDD (degenerative disc disease), lumbar      !! DULoxetine (CYMBALTA) 30 MG capsule Take 1 capsule (30 mg) by mouth At Bedtime    Associated Diagnoses: DDD (degenerative disc disease), lumbar; Anaclitic depression      !! DULoxetine (CYMBALTA) 60 MG capsule Take 1 capsule (60 mg) by mouth every morning    Associated Diagnoses: DDD (degenerative disc disease), lumbar; Anaclitic depression      NIFEdipine ER OSMOTIC (ADALAT CC) 30 MG 24 hr tablet Take 1 tablet (30 mg) by mouth daily    Associated Diagnoses: Essential hypertension,  benign      oxyCODONE (OXYCONTIN) 10 MG 12 hr tablet Take 1 tablet (10 mg) by mouth every 12 hours  Qty: 30 tablet, Refills: 0    Associated Diagnoses: DDD (degenerative disc disease), lumbar      oxyCODONE (ROXICODONE) 5 MG tablet Take 1 tablet (5 mg) by mouth every 4 hours as needed for moderate to severe pain  Qty: 30 tablet, Refills: 0    Associated Diagnoses: DDD (degenerative disc disease), lumbar      senna-docusate (SENOKOT-S/PERICOLACE) 8.6-50 MG tablet Take 1 tablet by mouth 2 times daily    Associated Diagnoses: Closed fracture of ramus of left pubis, initial encounter (H)      tiZANidine (ZANAFLEX) 2 MG tablet Take 1 tablet (2 mg) by mouth every 8 hours as needed for muscle spasms    Associated Diagnoses: DDD (degenerative disc disease), lumbar       !! - Potential duplicate medications found. Please discuss with provider.      CONTINUE these medications which have NOT CHANGED    Details   acetaminophen (TYLENOL) 500 MG tablet Take 1,000 mg by mouth 3 times daily      atorvastatin (LIPITOR) 40 MG tablet TAKE 1 TABLET BY MOUTH ONCE DAILY  Qty: 30 tablet, Refills: 98    Associated Diagnoses: Congestive heart failure, unspecified HF chronicity, unspecified heart failure type (H)      BREO ELLIPTA 100-25 MCG/INH inhaler INHALE 1 PUFF BY MOUTH ONCE DAILY  Qty: 1 Inhaler, Refills: 11    Associated Diagnoses: Chronic obstructive pulmonary disease, unspecified COPD type (H)      DONEPEZIL HCL PO Take 5 mg by mouth At Bedtime       gabapentin (NEURONTIN) 100 MG capsule Take 2 capsules (200 mg) by mouth 2 times daily  Qty: 120 capsule, Refills: 11    Associated Diagnoses: DDD (degenerative disc disease), lumbar; Spinal stenosis of lumbar region, unspecified whether neurogenic claudication present      Lidocaine (LIDOCARE) 4 % Patch Place 1 patch onto the skin daily as needed To desired area (shoulder or hip). On for 12hrs, off for 12hrs      OMEPRAZOLE PO Take 40 mg by mouth every morning      VITAMIN D3 1000  units tablet TAKE 1 TABLET BY MOUTH ONCE DAILY  Qty: 31 tablet, Refills: 98    Associated Diagnoses: Osteoporosis without current pathological fracture, unspecified osteoporosis type      bisacodyl (DULCOLAX) 10 MG suppository Place 10 mg rectally daily as needed for constipation      Blood Glucose Monitoring Suppl CORY 2 times daily Call nurse for further directions if blood glucose is less than 80 or greater than 250      Hypromellose (NATURAL BALANCE TEARS OP) Place 1 drop into both eyes every 6 hours as needed (to both eyes)       levalbuterol (XOPENEX) 1.25 MG/3ML neb solution Take 1 ampule by nebulization every 4 hours as needed for shortness of breath / dyspnea or wheezing And every 4 hours PRN      magnesium hydroxide (MILK OF MAGNESIA) 400 MG/5ML suspension Take 30 mLs by mouth daily as needed for constipation or heartburn      Menthol, Topical Analgesic, (BIOFREEZE) 4 % GEL Externally apply topically 4 times daily as needed To left shoulder and right hip      ondansetron (ZOFRAN) 4 MG tablet Take 4 mg by mouth every 6 hours as needed for nausea         STOP taking these medications       furosemide (LASIX) 40 MG tablet Comments:   Reason for Stopping:         lisinopril (PRINIVIL/ZESTRIL) 20 MG tablet Comments:   Reason for Stopping:         nystatin (MYCOSTATIN) 581991 UNIT/GM external powder Comments:   Reason for Stopping:                    Pending Results:     Unresulted Labs Ordered in the Past 30 Days of this Admission     Date and Time Order Name Status Description    11/4/2019 0310 Urine Culture Aerobic Bacterial Preliminary

## 2019-11-06 NOTE — PLAN OF CARE
Physical Therapy Discharge Summary    Reason for therapy discharge:    Discharged to transitional care facility.    Progress towards therapy goal(s). See goals on Care Plan in [facility] electronic health record for goal details.  Goals partially met.  Barriers to achieving goals:   discharge on same date as initial evaluation.    Therapy recommendation(s):    Continued therapy is recommended.  Rationale/Recommendations:  continued PT at TCU to increase strength, improve pt;'s ability to transfer.

## 2019-11-06 NOTE — PLAN OF CARE
Pt alert and oriented. Reports pain improving somewhat. Up to commode with A2 and standing mechanical lift, patient tolerated well had BM. Lidoderm patch placed to right hip per pt preference for chronic pain. Planning for discharge to TCU likely today.

## 2019-11-06 NOTE — PLAN OF CARE
[facility] [facility] DISCHARGE NOTE    Patient discharged to transitional care unit at 2:28 PM via wheel chair. Accompanied by [employer] . Discharge instructions reviewed with mehul nurse at TCU , opportunity offered to ask questions. All belongings sent with patient.    Zoë Monsalve RN

## 2019-11-06 NOTE — PLAN OF CARE
Occupational Therapy Discharge Summary    Reason for therapy discharge:    Discharged to transitional care facility.    Progress towards therapy goal(s). See goals on Care Plan in [facility] electronic health record for goal details.  Goals partially met.  Barriers to achieving goals:   discharge from facility.    Therapy recommendation(s):    Continued therapy is recommended.  Rationale/Recommendations:  TCU to increase independence with ADLs and related transfers/mobility.

## 2019-11-06 NOTE — PLAN OF CARE
Patient up to chair and bedside commode with easy stand. Tylenol and scheduled oxycodone given for pain control. Eating and drinking without problem. Bed alarm on for safety.

## 2019-11-06 NOTE — PROGRESS NOTES
Name: Jazmin Clifton    MRN#: [id number]    Reason for Hospitalization: Fall, initial encounter [W19.XXXA]  Closed fracture of ramus of left pubis, initial encounter (H) [S32.592A]    Discharge Date: 11/6/2019    Patient / Family response to discharge plan: [facility]Phone: [phone] Fax: [phone]) via [facility]. This writer let pt know, she is in agreement, have left 2 voicemails with 2 different family members.     Patient/Family is able to regulate their own oxygen needs during transport. Pt does  need oxygen ordered for transport. Oxygen has been ordered by CTS team for transport from [facility] (Phone: [phone] Fax: [phone]).    Other Providers (Care Coordinator, [facility], PCA services etc): YES  Mirian Weldon MA [facility] Care Coordinator   [phone]         - She has been notified of pts discharge plan.    CTS Hand Off Completed: Yes: completed    PAS #:[id number]    LINN Score: elevated    Future Appointments:   Future Appointments   Date Time Provider Department Center   11/6/2019  1:00 PM Honey Espinoza PT [facility]   11/14/2019 10:45 AM Farhat Fonseca MD [facility]       Discharge Disposition: transitional care unit    Discharge Planner   Discharge Plans in progress: [facility]  Barriers to discharge plan: none  Follow up plan: TCU       Entered by: Jazmine Ruiz 11/06/2019 12:13 PM           Jazmien Ruiz MSW, LICSW, [phone]

## 2019-11-07 NOTE — PROGRESS NOTES
[place] [facility]  PRIMARY CARE PROVIDER AND CLINIC:  MARY Gómez CNP, [address] [address] / [facility] [address]  Chief Complaint   Patient presents with     Establish Care     [place] Medical Record Number:  [id number]  Place of Service where encounter took place:  [facility] [place] [facility] ([facility]) [[id number]]    Jazmin Clifton  is a 86 year old  (12/30/1932), admitted to the above facility from  [facility]. Hospital stay 11/2/2019 through 11/6/2019..  Admitted to this facility for  rehab, medical management and nursing care.    HPI:    HPI information obtained from: facility chart records, facility staff, patient report and [place] Epic chart review.   Brief Summary of Hospital Course:     Acute kidney injury stage 1 on CKD stage 3  - Cr increased from 1.05 to 1.60 on 11/4.  - Discontinue lasix, lisinopril, and naproxen on 11/4  - Treated with IVF overnight 11/4 to 11/5  - Cr improved AM of 11/6 to 0.96  - WASHINGTON resolved     Mechanical fall  Pubic ramus fracture (H):  Accidental fall, unable to transfer in the ED therefore admitted for pain control, admitted to observation  - Continue OxyContin bid and acetaminophen 1 g tid.    - Continue prn oxycodone and tizanidine  - Heating pad and ice packs prn  - Patient continues to have pain in left hip with standing  - Encourage activity with weight bearing as tolerated     Essential hypertension  Chronic diastolic congestive heart failure  Coronary artery disease of native heart with stable angina pectoris, unspecified vessel or lesion type   - Blood pressure elevated this admission.    - Continue PTA low-dose aspirin and nifedipine  - Stop lisinopril and lasix due to WASHINGTON  - Re-evaluate restarting lasix or lisinopril at TCU     DDD (degenerative disc disease), lumbar  - Continue PTA OxyContin and oxycodone.       Chronic obstructive pulmonary disease, unspecified COPD type  - Continue PTA Breo Ellipta and  Xopenex.     Alzheimer's dementia without behavioral disturbance  - On donepezil.     Anaclitic depression  - On Cymbalta, mood appears to be stable.      Anemia of chronic renal failure, stage 3 (severe) (H)  - iron deficiency based on February 2019 blood work.  Iron supplements ordered.     Urinary retention  - Muniz catheter placed on 11/4 due to urinary retention  - Wean catheter at TCU     Acute hypoxia due to atelectasis  - CXR demonstrates bibasilar atelectasis with no clinical evidence for acute lung infection or pneumonia  - Wean oxygen at TCU    Updates on Status Since Skilled nursing Admission: Patient 85yo female with above Dx/Hx, fell at [facility], muniz patent and output WNL, SBP's today in the 180 range with others generally in the 130-160 range, edema+, puffy wrists, no SOB, chronic pain with R leg/hip use, weak, deconditioned, intermittent nausea and took zofran PRN this am, overall status WNL, no acute concerns.     CODE STATUS/ADVANCE DIRECTIVES DISCUSSION:   DNR / DNI  Patient's living condition: lives in a skilled nursing facility  ALLERGIES: Accupril; Ace inhibitors; Augmentin; Blood-group specific substance; Levofloxacin; Morphine; Nitrofurantoin; Norvasc [amlodipine besylate]; and Quinapril  PAST MEDICAL HISTORY:  has a past medical history of ABDOMINAL PAIN GENERALIZED (3/15/2006), Abdominal pain, generalized (3/15/2006), Atherosclerosis of renal artery (H), BENIGN HYPERTENSION (5/1/2006), Benign neoplasm of scalp and skin of neck, Cerebral aneurysm, nonruptured, Congestive heart failure (H), Depressive disorder, not elsewhere classified, Esophageal reflux, Female stress incontinence, Gastrointestinal malfunction arising from mental factors, Generalized osteoarthrosis, unspecified site, Herpes zoster dermatitis of eyelid, Insomnia, unspecified, Lumbago, Other chest pain, Other specified cardiac dysrhythmias(427.89), Rectocele, Unspecified disorder of kidney and ureter, and  Unspecified essential hypertension.  PAST SURGICAL HISTORY:   has a past surgical history that includes surgical history of - ; surgical history of - ; surgical history of -  (1996); surgical history of - ; surgical history of - ; cholecystectomy, laporoscopic (1997); hysterectomy, mirtha (1982); Endarterectomy carotid (Right, 8/31/2017); Cystoscopy, Biopsy Urethra (N/A, 6/10/2019); and Urethroplasty (N/A, 6/10/2019).  FAMILY HISTORY: family history includes Asthma in her son; Cancer in her brother and mother; Cerebrovascular Disease in her father; Diabetes in her son; Eye Disorder in her son; Gastrointestinal Disease in her son; Heart Disease in her father.  SOCIAL HISTORY:   reports that she quit smoking about 27 years ago. She has never used smokeless tobacco. She reports current alcohol use. She reports that she does not use drugs.    Post Discharge Medication Reconciliation Status: discharge medications reconciled and changed, per note/orders (see AVS)    Current Outpatient Medications   Medication Sig Dispense Refill     acetaminophen (TYLENOL) 500 MG tablet Take 1,000 mg by mouth 3 times daily       aspirin (ASA) 81 MG chewable tablet Take 1 tablet (81 mg) by mouth daily       atorvastatin (LIPITOR) 40 MG tablet TAKE 1 TABLET BY MOUTH ONCE DAILY 30 tablet 98     bisacodyl (DULCOLAX) 10 MG suppository Place 10 mg rectally daily as needed for constipation       Blood Glucose Monitoring Suppl CORY 2 times daily Call nurse for further directions if blood glucose is less than 80 or greater than 250       BREO ELLIPTA 100-25 MCG/INH inhaler INHALE 1 PUFF BY MOUTH ONCE DAILY 1 Inhaler 11     calcium carbonate (TUMS) 500 MG chewable tablet Take 1 chew tab by mouth 3 times daily as needed for heartburn       calcium carbonate (TUMS) 500 MG chewable tablet Take 1 tablet (500 mg) by mouth 3 times daily       DONEPEZIL HCL PO Take 5 mg by mouth At Bedtime        DULoxetine (CYMBALTA) 30 MG capsule Take 1 capsule (30 mg) by  mouth At Bedtime       DULoxetine (CYMBALTA) 60 MG capsule Take 1 capsule (60 mg) by mouth every morning       ferrous sulfate (FEROSUL) 325 (65 Fe) MG tablet Take 1 tablet (325 mg) by mouth daily       fluticasone (FLONASE) 50 MCG/ACT nasal spray Spray 1 spray into both nostrils daily       furosemide (LASIX) 20 MG tablet Take 1 tablet (20 mg) by mouth daily 30 tablet 1     gabapentin (NEURONTIN) 100 MG capsule Take 2 capsules (200 mg) by mouth 2 times daily 120 capsule 11     hypromellose-dextran (ARTIFICAL TEARS) 0.1-0.3 % ophthalmic solution Place 1 drop into both eyes every 6 hours as needed for dry eyes       levalbuterol (XOPENEX) 1.25 MG/3ML neb solution Take 1 ampule by nebulization every 4 hours as needed for shortness of breath / dyspnea or wheezing And every 4 hours PRN       Lidocaine (LIDOCARE) 4 % Patch Place 1 patch onto the skin daily as needed To desired area (shoulder or hip). On for 12hrs, off for 12hrs       lisinopril (PRINIVIL/ZESTRIL) 10 MG tablet Take 1 tablet (10 mg) by mouth daily 30 tablet 1     magnesium hydroxide (MILK OF MAGNESIA) 400 MG/5ML suspension Take 30 mLs by mouth daily as needed for constipation or heartburn       Menthol, Topical Analgesic, (BIOFREEZE) 4 % GEL Externally apply topically 4 times daily as needed To left shoulder and right hip       miconazole (MICATIN/MICRO GUARD) 2 % external powder Apply topically 2 times daily as needed for other (Intetriginous candidiasis)       NIFEdipine ER OSMOTIC (ADALAT CC) 30 MG 24 hr tablet Take 1 tablet (30 mg) by mouth daily       OMEPRAZOLE PO Take 40 mg by mouth every morning       ondansetron (ZOFRAN) 4 MG tablet Take 4 mg by mouth every 6 hours as needed for nausea       order for DME Equipment being ordered: Oxygen at 2 lpm via nasal cannula continuous with portability. Dx: respiratory failure, COPD 1 Device 0     oxyCODONE (OXYCONTIN) 10 MG 12 hr tablet Take 1 tablet (10 mg) by mouth every 12 hours 30 tablet 0     oxyCODONE  "(ROXICODONE) 5 MG tablet Take 1 tablet (5 mg) by mouth every 4 hours as needed for moderate to severe pain 30 tablet 0     senna-docusate (SENOKOT-S/PERICOLACE) 8.6-50 MG tablet Take 1 tablet by mouth 2 times daily       tiZANidine (ZANAFLEX) 2 MG tablet Take 1 tablet (2 mg) by mouth every 8 hours as needed for muscle spasms       VITAMIN D3 1000 units tablet TAKE 1 TABLET BY MOUTH ONCE DAILY 31 tablet 98       ROS:  10 point ROS of systems including Constitutional, Eyes, Respiratory, Cardiovascular, Gastroenterology, Genitourinary, Integumentary, Musculoskeletal, Psychiatric were all negative except for pertinent positives noted in my HPI.    Vitals:  BP (!) 147/69   Pulse 74   Temp 97.4  F (36.3  C)   Resp 20   Ht 1.6 m (5' 3\")   Wt 86.6 kg (191 lb)   SpO2 94%   BMI 33.83 kg/m    Exam:  GENERAL APPEARANCE:  in no distress, appears healthy  ENT:  Mouth and posterior oropharynx normal, moist mucous membranes, normal hearing acuity  RESP:  no respiratory distress, crackles fine L base vesicular  CV:  regular rate and rhythm, no murmur, rub, or gallop, peripheral edema 1-2+ in lower extremities  ABDOMEN:  no guarding or rebound, bowel sounds normal  M/S:   Gait and station abnormal requires assist/walker  SKIN:  Inspection of skin and subcutaneous tissueWNL, mist forearm/hand bruising  NEURO:   Examination of sensation by touch normal  PSYCH:  oriented to self, affect and mood normal    Lab/Diagnostic data:  Recent labs in [facility] reviewed by me today.     ASSESSMENT/PLAN:  Closed fracture of ramus of left pubis with routine healing, subsequent encounter  Fall, subsequent encounter  -appears that previous issues with osteoarthritis, DDD, and bursitis are causing more pain/issues than pubis Fx  -weak, deconditioned, WBAT  -continue oxycodone 5mg Q4h PRN and acetaminophen 1000mg TID scheduled   -continue senokot 1 tab BID plus bisacodyl suppository PRN  -remove muniz/trial void on 11/11 (previously failed 2 trials " in hospital) replace 16g 10cc with 3 consecutive straight caths  -PT/OT to eval and treat  -CBC/BMP/Fe on 11/11  -no orthopedic follow up indicated    Essential hypertension  Chronic diastolic congestive heart failure (H)  Coronary artery disease of native heart with stable angina pectoris, unspecified vessel or lesion type (H)  -SBP's elevated in the 130-180 range in TCU  -lisinopril and furosemide DC'd in hospital, has allergy to lisinopril but was on 20mg BID at previous TCU stay per report  -start lisinopril 10mg and furosemide 20mg, titrate up PRN  -continue ASA81, nifedipine ER 30mg, atorvastatin 40mg  -staff to follow VS and weights daily    Chronic obstructive pulmonary disease, unspecified COPD type (H)  -no overt s/s COPD exacerbation  -continue Breo daily, albuterol neb Q4h PRN  -if having increased SOB plan to schedule albuterol    Chronic pain syndrome  -multi-factorial including DDD  -continue oxycontin 10mg BID, gabapentin 200mg BID, lidocaine patch 4% and tizanadine 2mg Q8h PRN  -has appt 11/14 with  at [facility] for R hip injections scheduled    Lymphedema  -bilateral lower legs, has compression garments but not using this am  -staff to apply garments Qam, remove Qpm    CKD (chronic kidney disease) stage 3, GFR 30-59 ml/min (H)  -recent creats 1.47-1.60, GFR's 29-32  -dose medications renally as possible  -BMP on 11/11    Anemia, unspecified type  -Hgb range 8.3-10.5  -started on Fe 325mg previously  -CBC/Fe on 11/11    Late onset Alzheimer's disease without behavioral disturbance (H)  -pleasantly confused, limited memory  -continue duloxetine 60 am 30 pm, donepezil 5mg at bedtime  -plan to follow up PCP after TCU discharge           Electronically signed by:  Farhat Mendez NP

## 2019-11-08 PROBLEM — D64.9 ANEMIA, UNSPECIFIED TYPE: Status: ACTIVE | Noted: 2019-01-01

## 2019-11-08 PROBLEM — G89.4 CHRONIC PAIN SYNDROME: Status: ACTIVE | Noted: 2019-01-01

## 2019-11-08 PROBLEM — I89.0 LYMPHEDEMA: Status: ACTIVE | Noted: 2019-01-01

## 2019-11-08 NOTE — LETTER
11/8/2019        RE: Jazmin Clifton  [address]  [place] [address]        [place] [facility]  PRIMARY CARE PROVIDER AND CLINIC:  MARY Gómez [employer], [address] [place] [address]  Chief Complaint   Patient presents with     Establish Care     [place] Medical Record Number:  [id number]  Place of Service where encounter took place:  [facility] [place] [facility] - [facility] (SNF) [[id number]]    Jazmin Clifton  is a 86 year old  (12/30/1932), admitted to the above facility from  [facility]. Hospital stay 11/2/2019 through 11/6/2019..  Admitted to this facility for  rehab, medical management and nursing care.    HPI:    HPI information obtained from: facility chart records, facility staff, patient report and [facility] chart review.   Brief Summary of Hospital Course:     Acute kidney injury stage 1 on CKD stage 3  - Cr increased from 1.05 to 1.60 on 11/4.  - Discontinue lasix, lisinopril, and naproxen on 11/4  - Treated with IVF overnight 11/4 to 11/5  - Cr improved AM of 11/6 to 0.96  - W[place] resolved     Mechanical fall  Pubic ramus fracture (H):  Accidental fall, unable to transfer in the ED therefore admitted for pain control, admitted to observation  - Continue OxyContin bid and acetaminophen 1 g tid.    - Continue prn oxycodone and tizanidine  - Heating pad and ice packs prn  - Patient continues to have pain in left hip with standing  - Encourage activity with weight bearing as tolerated     Essential hypertension  Chronic diastolic congestive heart failure  Coronary artery disease of native heart with stable angina pectoris, unspecified vessel or lesion type   - Blood pressure elevated this admission.    - Continue PTA low-dose aspirin and nifedipine  - Stop lisinopril and lasix due to W[place]  - Re-evaluate restarting lasix or lisinopril at TCU     DDD (degenerative disc disease), lumbar  - Continue PTA OxyContin and oxycodone.       Chronic  obstructive pulmonary disease, unspecified COPD type  - Continue PTA Breo Ellipta and Xopenex.     Alzheimer's dementia without behavioral disturbance  - On donepezil.     Anaclitic depression  - On Cymbalta, mood appears to be stable.      Anemia of chronic renal failure, stage 3 (severe) (H)  - iron deficiency based on February 2019 blood work.  Iron supplements ordered.     Urinary retention  - Muniz catheter placed on 11/4 due to urinary retention  - Wean catheter at TCU     Acute hypoxia due to atelectasis  - CXR demonstrates bibasilar atelectasis with no clinical evidence for acute lung infection or pneumonia  - Wean oxygen at TCU    Updates on Status Since Skilled nursing Admission: Patient 85yo female with above Dx/Hx, fell at [facility], muniz patent and output WNL, SBP's today in the 180 range with others generally in the 130-160 range, edema+, puffy wrists, no SOB, chronic pain with R leg/hip use, weak, deconditioned, intermittent nausea and took zofran PRN this am, overall status WNL, no acute concerns.     CODE STATUS/ADVANCE DIRECTIVES DISCUSSION:   DNR / DNI  Patient's living condition: lives in a skilled nursing facility  ALLERGIES: Accupril; Ace inhibitors; Augmentin; Blood-group specific substance; Levofloxacin; Morphine; Nitrofurantoin; Norvasc [amlodipine besylate]; and Quinapril  PAST MEDICAL HISTORY:  has a past medical history of ABDOMINAL PAIN GENERALIZED (3/15/2006), Abdominal pain, generalized (3/15/2006), Atherosclerosis of renal artery (H), BENIGN HYPERTENSION (5/1/2006), Benign neoplasm of scalp and skin of neck, Cerebral aneurysm, nonruptured, Congestive heart failure (H), Depressive disorder, not elsewhere classified, Esophageal reflux, Female stress incontinence, Gastrointestinal malfunction arising from mental factors, Generalized osteoarthrosis, unspecified site, Herpes zoster dermatitis of eyelid, Insomnia, unspecified, Lumbago, Other chest pain, Other specified cardiac  dysrhythmias(427.89), Rectocele, Unspecified disorder of kidney and ureter, and Unspecified essential hypertension.  PAST SURGICAL HISTORY:   has a past surgical history that includes surgical history of - ; surgical history of - ; surgical history of -  (1996); surgical history of - ; surgical history of - ; cholecystectomy, laporoscopic (1997); hysterectomy, mirtha (1982); Endarterectomy carotid (Right, 8/31/2017); Cystoscopy, Biopsy Urethra (N/A, 6/10/2019); and Urethroplasty (N/A, 6/10/2019).  FAMILY HISTORY: family history includes Asthma in her son; Cancer in her brother and mother; Cerebrovascular Disease in her father; Diabetes in her son; Eye Disorder in her son; Gastrointestinal Disease in her son; Heart Disease in her father.  SOCIAL HISTORY:   reports that she quit smoking about 27 years ago. She has never used smokeless tobacco. She reports current alcohol use. She reports that she does not use drugs.    Post Discharge Medication Reconciliation Status: discharge medications reconciled and changed, per note/orders (see AVS)    Current Outpatient Medications   Medication Sig Dispense Refill     acetaminophen (TYLENOL) 500 MG tablet Take 1,000 mg by mouth 3 times daily       aspirin (ASA) 81 MG chewable tablet Take 1 tablet (81 mg) by mouth daily       atorvastatin (LIPITOR) 40 MG tablet TAKE 1 TABLET BY MOUTH ONCE DAILY 30 tablet 98     bisacodyl (DULCOLAX) 10 MG suppository Place 10 mg rectally daily as needed for constipation       Blood Glucose Monitoring Suppl CORY 2 times daily Call nurse for further directions if blood glucose is less than 80 or greater than 250       BREO ELLIPTA 100-25 MCG/INH inhaler INHALE 1 PUFF BY MOUTH ONCE DAILY 1 Inhaler 11     calcium carbonate (TUMS) 500 MG chewable tablet Take 1 chew tab by mouth 3 times daily as needed for heartburn       calcium carbonate (TUMS) 500 MG chewable tablet Take 1 tablet (500 mg) by mouth 3 times daily       DONEPEZIL HCL PO Take 5 mg by mouth  At Bedtime        DULoxetine (CYMBALTA) 30 MG capsule Take 1 capsule (30 mg) by mouth At Bedtime       DULoxetine (CYMBALTA) 60 MG capsule Take 1 capsule (60 mg) by mouth every morning       ferrous sulfate (FEROSUL) 325 (65 Fe) MG tablet Take 1 tablet (325 mg) by mouth daily       fluticasone (FLONASE) 50 MCG/ACT nasal spray Spray 1 spray into both nostrils daily       furosemide (LASIX) 20 MG tablet Take 1 tablet (20 mg) by mouth daily 30 tablet 1     gabapentin (NEURONTIN) 100 MG capsule Take 2 capsules (200 mg) by mouth 2 times daily 120 capsule 11     hypromellose-dextran (ARTIFICAL TEARS) 0.1-0.3 % ophthalmic solution Place 1 drop into both eyes every 6 hours as needed for dry eyes       levalbuterol (XOPENEX) 1.25 MG/3ML neb solution Take 1 ampule by nebulization every 4 hours as needed for shortness of breath / dyspnea or wheezing And every 4 hours PRN       Lidocaine (LIDOCARE) 4 % Patch Place 1 patch onto the skin daily as needed To desired area (shoulder or hip). On for 12hrs, off for 12hrs       lisinopril (PRINIVIL/ZESTRIL) 10 MG tablet Take 1 tablet (10 mg) by mouth daily 30 tablet 1     magnesium hydroxide (MILK OF MAGNESIA) 400 MG/5ML suspension Take 30 mLs by mouth daily as needed for constipation or heartburn       Menthol, Topical Analgesic, (BIOFREEZE) 4 % GEL Externally apply topically 4 times daily as needed To left shoulder and right hip       miconazole (MICATIN/MICRO GUARD) 2 % external powder Apply topically 2 times daily as needed for other (Intetriginous candidiasis)       NIFEdipine ER OSMOTIC (ADALAT CC) 30 MG 24 hr tablet Take 1 tablet (30 mg) by mouth daily       OMEPRAZOLE PO Take 40 mg by mouth every morning       ondansetron (ZOFRAN) 4 MG tablet Take 4 mg by mouth every 6 hours as needed for nausea       order for DME Equipment being ordered: Oxygen at 2 lpm via nasal cannula continuous with portability. Dx: respiratory failure, COPD 1 Device 0     oxyCODONE (OXYCONTIN) 10 MG 12 hr  "tablet Take 1 tablet (10 mg) by mouth every 12 hours 30 tablet 0     oxyCODONE (ROXICODONE) 5 MG tablet Take 1 tablet (5 mg) by mouth every 4 hours as needed for moderate to severe pain 30 tablet 0     senna-docusate (SENOKOT-S/PERICOLACE) 8.6-50 MG tablet Take 1 tablet by mouth 2 times daily       tiZANidine (ZANAFLEX) 2 MG tablet Take 1 tablet (2 mg) by mouth every 8 hours as needed for muscle spasms       VITAMIN D3 1000 units tablet TAKE 1 TABLET BY MOUTH ONCE DAILY 31 tablet 98       ROS:  10 point ROS of systems including Constitutional, Eyes, Respiratory, Cardiovascular, Gastroenterology, Genitourinary, Integumentary, Musculoskeletal, Psychiatric were all negative except for pertinent positives noted in my HPI.    Vitals:  BP (!) 147/69   Pulse 74   Temp 97.4  F (36.3  C)   Resp 20   Ht 1.6 m (5' 3\")   Wt 86.6 kg (191 lb)   SpO2 94%   BMI 33.83 kg/m     Exam:  GENERAL APPEARANCE:  in no distress, appears healthy  ENT:  Mouth and posterior oropharynx normal, moist mucous membranes, normal hearing acuity  RESP:  no respiratory distress, crackles fine L base vesicular  CV:  regular rate and rhythm, no murmur, rub, or gallop, peripheral edema 1-2+ in lower extremities  ABDOMEN:  no guarding or rebound, bowel sounds normal  M/S:   Gait and station abnormal requires assist/walker  SKIN:  Inspection of skin and subcutaneous tissueWNL, mist forearm/hand bruising  NEURO:   Examination of sensation by touch normal  PSYCH:  oriented to self, affect and mood normal    Lab/Diagnostic data:  Recent labs in [facility] reviewed by me today.     ASSESSMENT/PLAN:  Closed fracture of ramus of left pubis with routine healing, subsequent encounter  Fall, subsequent encounter  -appears that previous issues with osteoarthritis, DDD, and bursitis are causing more pain/issues than pubis Fx  -weak, deconditioned, WBAT  -continue oxycodone 5mg Q4h PRN and acetaminophen 1000mg TID scheduled   -continue senokot 1 tab BID plus bisacodyl " suppository PRN  -remove muniz/trial void on 11/11 (previously failed 2 trials in hospital) replace 16g 10cc with 3 consecutive straight caths  -PT/OT to eval and treat  -CBC/BMP/Fe on 11/11  -no orthopedic follow up indicated    Essential hypertension  Chronic diastolic congestive heart failure (H)  Coronary artery disease of native heart with stable angina pectoris, unspecified vessel or lesion type (H)  -SBP's elevated in the 130-180 range in TCU  -lisinopril and furosemide DC'd in hospital, has allergy to lisinopril but was on 20mg BID at previous TCU stay per report  -start lisinopril 10mg and furosemide 20mg, titrate up PRN  -continue ASA81, nifedipine ER 30mg, atorvastatin 40mg  -staff to follow VS and weights daily    Chronic obstructive pulmonary disease, unspecified COPD type (H)  -no overt s/s COPD exacerbation  -continue Breo daily, albuterol neb Q4h PRN  -if having increased SOB plan to schedule albuterol    Chronic pain syndrome  -multi-factorial including DDD  -continue oxycontin 10mg BID, gabapentin 200mg BID, lidocaine patch 4% and tizanadine 2mg Q8h PRN  -has appt 11/14 with  [place] [facility] [place]    Lymphedema  -bilateral lower legs, has compression garments but not using this am  -staff to apply garments Qam, remove Qpm    CKD (chronic kidney disease) stage 3, GFR 30-59 ml/min (H)  -recent creats 1.47-1.60, GFR's 29-32  -dose medications renally as possible  -BMP on 11/11    Anemia, unspecified type  -Hgb range 8.3-10.5  -started on Fe 325mg previously  -CBC/Fe on 11/11    Late onset Alzheimer's disease without behavioral disturbance (H)  -pleasantly confused, limited memory  -continue duloxetine 60 am 30 pm, donepezil 5mg at bedtime  -plan to follow up PCP after TCU discharge           Electronically signed by:  Farhat Mendez NP                      Sincerely,        Farhat Mendez, APRN CNP

## 2019-11-13 NOTE — PROGRESS NOTES
[facility]  INITIAL VISIT NOTE  November 13, 2019    PRIMARY CARE PROVIDER AND CLINIC:  Junie Estrella [address] [address] / [facility] [address]    Chief Complaint   Patient presents with     Hospital F/U       HPI:    Jazmin Clifton is a 86 year old  (12/30/1932) female who [facility] for an initial visit. Medical history is notable for CAD, HTN, COPD, CVA, R groin seroma and lumbar spinal s visit not done yet but he tenosis s/p L1-L2 bilateral laminectomy and medial facetectomy (1/9/19).   She was hospitalized at [facility] from 11/2/2019 to 11/6/2019 where she presented after a fall and was found to have a left inferior pubic rami fracture.  She was started on naproxen and developed acute renal failure for which the naproxen, lisinopril and furosemide were discontinued.  She was treated with IV fluids with improvement in renal function and has been resumed on the furosemide and lisinopril.  She was discharged with a Maldonado catheter due to acute urinary retention. She was admitted to this facility for medical management and rehab.     Today, Ms. Clifton is seen in her room. Overall is doing OK. Has some pain from the pelvic fracture, but it's manageable. No chest pain or dyspnea. No abdominal pain, diarrhea or constipation. No concerns per discussion with nursing. Working with therapies.     CODE STATUS:   DNR / DNI    ALLERGIES:     Allergies   Allergen Reactions     Accupril      Ace Inhibitors Unknown     Accupril     Augmentin Unknown     Blood-Group Specific Substance Other (See Comments)     Patient has a Non-specific antibody. Blood Product orders may be delayed.  Draw one red top and two purple top tubes for ALL Type and Screen/ Type and Crossmatch orders.      Levofloxacin Other (See Comments) and Muscle Pain (Myalgia)     Pt prescribed ciprofloxacin in Jan 2018 (no rxn documented)         Morphine Other (See Comments)      "hallucinations     Nitrofurantoin Nausea and Vomiting     Norvasc [Amlodipine Besylate] Swelling     Leg swelling       Quinapril Other (See Comments) and Unknown     Hypertension. 3.29.18 - Takes and tolerates lisinopril           PAST MEDICAL HISTORY:   Past Medical History:   Diagnosis Date     ABDOMINAL PAIN GENERALIZED 3/15/2006    1 month of abdominal pain that bryn radiates into her back and chest.  Pt was hospitilzed 2x for the pain at [facility] --pt hopsitized for 3 days.  Rcords not ab=vailable.  She was told either \" twisted intestine or blockage of bowel.  Pt feels it is the hiatal hernia.  Pt hospitilized again a second time  A month ago.  Ct scans x 2 showed \" nothing.\"  Pt had a barium and xrays.  Pt sa     Abdominal pain, generalized 3/15/2006    1 month of abdominal pain that bryn radiates into her back and chest.  Pt was hospitilzed 2x for the pain at [facility] --pt hopsitized for 3 days.  Rcords not ab=vailable.  She was told either \" twisted intestine or blockage of bowel.  Pt feels it is the hiatal hernia.  Pt hospitilized again a second time  A month ago.  Ct scans x 2 showed \" nothing.\"  Pt had a barium and xrays.  Pt sa     Atherosclerosis of renal artery (H)     Left renal artery stenosis     BENIGN HYPERTENSION 5/1/2006 5/1/2006   Increase catapres to 0.3 mg and decrease clonidine to 0.1 mg daily.  Recheck bp in 1 month.      Benign neoplasm of scalp and skin of neck     Seborrheic keratosis     Cerebral aneurysm, nonruptured     Cerebral Aneurysm     Congestive heart failure (H)      Depressive disorder, not elsewhere classified     Depression     Esophageal reflux     Gastroesophageal Reflux Disease     Female stress incontinence      Gastrointestinal malfunction arising from mental factors     Dyspepsia     Generalized osteoarthrosis, unspecified site     DJD-chronic back pain     Herpes zoster dermatitis of eyelid      Insomnia, unspecified      Lumbago     " Chronic back pain     Other chest pain     Atypical Chest Pain     Other specified cardiac dysrhythmias(427.89)     Bradycardia, improved on lower dose beta blockers      Rectocele     Grade 3     Unspecified disorder of kidney and ureter     Renal insufficiency     Unspecified essential hypertension        PAST SURGICAL HISTORY:   Past Surgical History:   Procedure Laterality Date     CHOLECYSTECTOMY, LAPOROSCOPIC  1997    Cholecystectomy, Laparoscopic     CYSTOSCOPY, BIOPSY URETHRA N/A 6/10/2019    Procedure: Cystoscopy With Biopsy, Removal Of Urethral Lesion;  Surgeon: Yesy Fong MD;  Location: [facility] OR     ENDARTERECTOMY CAROTID Right 8/31/2017    Procedure: ENDARTERECTOMY CAROTID;  RIGHT CAROTID ENDARTERECTOMY WITH EEG;  Surgeon: Hilario Parry MD;  Location: [facility] OR     HYSTERECTOMY, RAYMOND  1982    oophorectomy,RAYMOND     SURGICAL HISTORY OF -       Laminectomy x 3     SURGICAL HISTORY OF -       MCA Aneurysm repair     SURGICAL HISTORY OF -   1996    Bladder suspension (Bladder Repair x2)     SURGICAL HISTORY OF -       Bilateral Hand Surgeries for Arthritis x2     SURGICAL HISTORY OF -       Repair of a Cerebral Aneurysm     URETHROPLASTY N/A 6/10/2019    Procedure: Urethral Reconstruction;  Surgeon: Yesy Fong MD;  Location: [facility] OR       FAMILY HISTORY:   Family History   Problem Relation Age of Onset     Cancer Mother         stomache     Cerebrovascular Disease Father      Heart Disease Father      Cancer Brother         lymphoma     Eye Disorder Son      Asthma Son      Diabetes Son      Gastrointestinal Disease Son         no control over bladder or bowels     C.A.D. No family hx of      Hypertension No family hx of      Breast Cancer No family hx of      Cancer - colorectal No family hx of      Prostate Cancer No family hx of        SOCIAL HISTORY:   Lives alone in [place] apartment    MEDICATIONS:  Current Outpatient Medications   Medication Sig Dispense Refill     acetaminophen  (TYLENOL) 500 MG tablet Take 1,000 mg by mouth 3 times daily       aspirin (ASA) 81 MG chewable tablet Take 1 tablet (81 mg) by mouth daily       atorvastatin (LIPITOR) 40 MG tablet TAKE 1 TABLET BY MOUTH ONCE DAILY 30 tablet 98     bisacodyl (DULCOLAX) 10 MG suppository Place 10 mg rectally daily as needed for constipation       BREO ELLIPTA 100-25 MCG/INH inhaler INHALE 1 PUFF BY MOUTH ONCE DAILY 1 Inhaler 11     calcium carbonate (TUMS) 500 MG chewable tablet Take 1 tablet (500 mg) by mouth 3 times daily       DONEPEZIL HCL PO Take 5 mg by mouth At Bedtime        DULoxetine (CYMBALTA) 30 MG capsule Take 1 capsule (30 mg) by mouth At Bedtime       DULoxetine (CYMBALTA) 60 MG capsule Take 1 capsule (60 mg) by mouth every morning       ferrous sulfate (FEROSUL) 325 (65 Fe) MG tablet Take 1 tablet (325 mg) by mouth daily       fluticasone (FLONASE) 50 MCG/ACT nasal spray Spray 1 spray into both nostrils daily       furosemide (LASIX) 20 MG tablet Take 1 tablet (20 mg) by mouth daily 30 tablet 1     gabapentin (NEURONTIN) 100 MG capsule Take 2 capsules (200 mg) by mouth 2 times daily 120 capsule 11     hypromellose-dextran (ARTIFICAL TEARS) 0.1-0.3 % ophthalmic solution Place 1 drop into both eyes every 6 hours as needed for dry eyes       levalbuterol (XOPENEX) 1.25 MG/3ML neb solution Take 1 ampule by nebulization every 4 hours as needed for shortness of breath / dyspnea or wheezing And every 4 hours PRN       Lidocaine (LIDOCARE) 4 % Patch Place 1 patch onto the skin daily as needed To desired area (shoulder or hip). On for 12hrs, off for 12hrs       lisinopril (PRINIVIL/ZESTRIL) 10 MG tablet Take 1 tablet (10 mg) by mouth daily 30 tablet 1     magnesium hydroxide (MILK OF MAGNESIA) 400 MG/5ML suspension Take 30 mLs by mouth daily as needed for constipation or heartburn       miconazole (MICATIN/MICRO GUARD) 2 % external powder Apply topically 2 times daily as needed for other (Intetriginous candidiasis)        "NIFEdipine ER OSMOTIC (ADALAT CC) 30 MG 24 hr tablet Take 1 tablet (30 mg) by mouth daily       OMEPRAZOLE PO Take 40 mg by mouth every morning       ondansetron (ZOFRAN) 4 MG tablet Take 4 mg by mouth every 6 hours as needed for nausea       oxyCODONE (OXYCONTIN) 10 MG 12 hr tablet Take 1 tablet (10 mg) by mouth every 12 hours 30 tablet 0     oxyCODONE (ROXICODONE) 5 MG tablet Take 1 tablet (5 mg) by mouth every 4 hours as needed for moderate to severe pain 30 tablet 0     senna-docusate (SENOKOT-S/PERICOLACE) 8.6-50 MG tablet Take 1 tablet by mouth 2 times daily       tiZANidine (ZANAFLEX) 2 MG tablet Take 1 tablet (2 mg) by mouth every 8 hours as needed for muscle spasms       VITAMIN D3 1000 units tablet TAKE 1 TABLET BY MOUTH ONCE DAILY 31 tablet 98     Blood Glucose Monitoring Suppl CORY 2 times daily Call nurse for further directions if blood glucose is less than 80 or greater than 250       Menthol, Topical Analgesic, (BIOFREEZE) 4 % GEL Externally apply topically 4 times daily as needed To left shoulder and right hip       order for DME Equipment being ordered: Oxygen at 2 lpm via nasal cannula continuous with portability. Dx: respiratory failure, COPD 1 Device 0         ROS:  Unable to obtain due to cognitive impairment or aphasia    PHYSICAL EXAM:  BP (!) 152/77   Pulse 75   Temp 99  F (37.2  C)   Resp 14   Ht 1.6 m (5' 3\")   Wt 87.5 kg (193 lb)   SpO2 92%   BMI 34.19 kg/m     Gen: laying in bed, alert, cooperative and in no acute distress  HEENT: normocephalic; oropharynx clear  Card: RRR, S1, S2, no murmurs  Resp: lungs clear to auscultation bilaterally, no crackles or wheezes   GI: abdomen soft, not-tender  MSK: normal muscle tone, no LE edema  Neuro: CX II-XII grossly in tact; ROM in all four extremities grossly in tact  Psych: memory, judgement and insight impaired    LABORATORY/IMAGING DATA:  Reviewed as per Epic    ASSESSMENT/PLAN:    Left Inferior Pubic Rami Fracture  Secondary to a fall. " She endorses pain today, but it's manageable.   -- WBAT  -- analgesia as below - see Chronic Pain. Oxycodone was increased from daily PRN to q4h PRN  -- PT/OT    Acute Renal Failure  Secondary to naproxen. Resolved with IV fluids. Furosemide and lisinopril were held but have been resumed. Cr 1.05 on 11/11.   -- follow cliniccally     CAD, CHF, HTN,  HLD  SBPs 130s. Weight stable around 193 lbs.   -- continues on ASA 81 mg daily, atorvastatin 40 mg qhs, furosemide 20 mg BID, lisinopril 10 mg daily and nifedipine 30 mg daily   -- follow BPs, weights, clinical volume status     COPD   No signs of exacerbation. Lungs clear.   -- continues on fluticasone-vilanterol 100-25 mcg daily and levalbuterol nebs PRN     Dementia Without Behavioral Disturbance  Lives in AL facility. CPT 4.7/5.6 during June 2018 TCU stay.   -- donepezil 5 mg daily      Depression, Anxiety  Mood and spirits good today  -- continues on duloxetine 60 mg qam and 30 mg at bedtime   -- ongoing supportive cares    Fe Deficiency Anemia  New on iron supplement during hospitalization. Baseline Hgb 10 range. She was in the 8s during hospitalization - ? if dilutional. Hgb 9.1 on 11/11  -- FeSO4 325 mg daily  -- repeat Hgb in a week or so     CVA  By history.   -- continues on ASA 81 mg daily and atorvastatin 40 mg qhs     Chronic Pain Syndrome   Lumbar Spinal Stenosis s/p L1 L2 Bilateral Laminectomy and Medial Facetectomy (1/9/19)  Today, pain is involving the R groin seroma.   -- analgesia with APAP 1000 mg TID, duloxetine 60 mg qam and 30 mg at bedtime, gabapentin 200mg BID, lidoderm patch, OxyContin 10 mg q12, oxycodone 5 mg q4h PRN and tizanidine 2 mg q8h PRN  -- she has followed in pain clinic - follow up with them as scheduled     Osteoporosis  -- continues on calcium and Vit D supplement      GERD  -- continues on omeprazole 40 mg daily     Slow Transit Constipation  -- continues on Senna-S 1 tab BID   -- adjust bowel regimen as  needed     Fall  Physical Deconditioning  In setting of hospitalization and underlying medical conditions.  -- ongoing PT/OT       Electronically signed by:  Ana Cristina Yu MD

## 2019-11-14 NOTE — IP AVS SNAPSHOT
MRN:[id number]                      After Visit Summary   11/14/2019    Jazmin Clifton    MRN: [id number]           Visit Information        Provider Department      11/14/2019 10:45 AM [facility]           Review of your medicines      UNREVIEWED medicines. Ask your doctor about these medicines       Dose / Directions   acetaminophen 500 MG tablet  Commonly known as:  TYLENOL      Dose:  1,000 mg  Take 1,000 mg by mouth 3 times daily  Refills:  0     aspirin 81 MG chewable tablet  Commonly known as:  ASA  Used for:  Coronary artery disease of native heart with stable angina pectoris, unspecified vessel or lesion type (H)      Dose:  81 mg  Take 1 tablet (81 mg) by mouth daily  Refills:  0     atorvastatin 40 MG tablet  Commonly known as:  LIPITOR  Used for:  Congestive heart failure, unspecified HF chronicity, unspecified heart failure type (H)      TAKE 1 TABLET BY MOUTH ONCE DAILY  Quantity:  30 tablet  Refills:  98     BIOFREEZE 4 % Gel  Generic drug:  Menthol (Topical Analgesic)      Externally apply topically 4 times daily as needed To left shoulder and right hip  Refills:  0     bisacodyl 10 MG suppository  Commonly known as:  DULCOLAX      Dose:  10 mg  Place 10 mg rectally daily as needed for constipation  Refills:  0     BREO ELLIPTA 100-25 MCG/INH inhaler  Used for:  Chronic obstructive pulmonary disease, unspecified COPD type (H)  Generic drug:  fluticasone-vilanterol      INHALE 1 PUFF BY MOUTH ONCE DAILY  Quantity:  1 Inhaler  Refills:  11     calcium carbonate 500 MG chewable tablet  Commonly known as:  TUMS  Used for:  Closed fracture of ramus of left pubis, initial encounter (H), DDD (degenerative disc disease), lumbar      Dose:  1 chew tab  Take 1 tablet (500 mg) by mouth 3 times daily  Refills:  0     DONEPEZIL HCL PO  Indication:  dementia      Dose:  5 mg  Take 5 mg by mouth At Bedtime  Refills:  0     * DULoxetine 30 MG capsule  Commonly known as:   CYMBALTA  Used for:  DDD (degenerative disc disease), lumbar, Anaclitic depression      Dose:  30 mg  Take 1 capsule (30 mg) by mouth At Bedtime  Refills:  0     * DULoxetine 60 MG capsule  Commonly known as:  CYMBALTA  Used for:  DDD (degenerative disc disease), lumbar, Anaclitic depression      Dose:  60 mg  Take 1 capsule (60 mg) by mouth every morning  Refills:  0     ferrous sulfate 325 (65 Fe) MG tablet  Commonly known as:  FEROSUL  Used for:  Anemia of chronic renal failure, stage 4 (severe) (H)      Dose:  325 mg  Take 1 tablet (325 mg) by mouth daily  Refills:  0     fluticasone 50 MCG/ACT nasal spray  Commonly known as:  FLONASE  Used for:  Nasal congestion      Dose:  1 spray  Spray 1 spray into both nostrils daily  Refills:  0     furosemide 20 MG tablet  Commonly known as:  LASIX  Used for:  Chronic diastolic congestive heart failure (H)      Dose:  20 mg  Take 1 tablet (20 mg) by mouth daily  Quantity:  30 tablet  Refills:  1     gabapentin 100 MG capsule  Commonly known as:  NEURONTIN  Used for:  DDD (degenerative disc disease), lumbar, Spinal stenosis of lumbar region, unspecified whether neurogenic claudication present      Dose:  200 mg  Take 2 capsules (200 mg) by mouth 2 times daily  Quantity:  120 capsule  Refills:  11     hypromellose-dextran 0.1-0.3 % ophthalmic solution  Commonly known as:  ARTIFICAL TEARS      Dose:  1 drop  Place 1 drop into both eyes every 6 hours as needed for dry eyes  Refills:  0     levalbuterol 1.25 MG/3ML neb solution  Commonly known as:  XOPENEX      Dose:  1 ampule  Take 1 ampule by nebulization every 4 hours as needed for shortness of breath / dyspnea or wheezing And every 4 hours PRN  Refills:  0     Lidocaine 4 % Patch  Commonly known as:  LIDOCARE      Dose:  1 patch  Place 1 patch onto the skin daily as needed To desired area (shoulder or hip). On for 12hrs, off for 12hrs  Refills:  0     lisinopril 10 MG tablet  Commonly known as:  PRINIVIL/ZESTRIL  Used for:   Essential hypertension      Dose:  10 mg  Take 1 tablet (10 mg) by mouth daily  Quantity:  30 tablet  Refills:  1     miconazole 2 % external powder  Commonly known as:  MICATIN/MICRO GUARD  Used for:  Intertrigo      Apply topically 2 times daily as needed for other (Intetriginous candidiasis)  Refills:  0     MILK OF MAGNESIA 400 MG/5ML suspension  Generic drug:  magnesium hydroxide      Dose:  30 mL  Take 30 mLs by mouth daily as needed for constipation or heartburn  Refills:  0     NIFEdipine ER 30 MG 24 hr tablet  Commonly known as:  ADALAT CC  Used for:  Essential hypertension, benign      Dose:  30 mg  Take 1 tablet (30 mg) by mouth daily  Refills:  0     OMEPRAZOLE PO  Indication:  Gastroesophageal Reflux Disease      Dose:  40 mg  Take 40 mg by mouth every morning  Refills:  0     ondansetron 4 MG tablet  Commonly known as:  ZOFRAN      Dose:  4 mg  Take 4 mg by mouth every 6 hours as needed for nausea  Refills:  0     * oxyCODONE 5 MG tablet  Commonly known as:  ROXICODONE  Used for:  DDD (degenerative disc disease), lumbar      Dose:  5 mg  Take 1 tablet (5 mg) by mouth every 4 hours as needed for moderate to severe pain  Quantity:  30 tablet  Refills:  0     * oxyCODONE 10 MG 12 hr tablet  Commonly known as:  OXYCONTIN  Used for:  DDD (degenerative disc disease), lumbar      Dose:  10 mg  Take 1 tablet (10 mg) by mouth every 12 hours  Quantity:  30 tablet  Refills:  0     senna-docusate 8.6-50 MG tablet  Commonly known as:  SENOKOT-S/PERICOLACE  Used for:  Closed fracture of ramus of left pubis, initial encounter (H)      Dose:  1 tablet  Take 1 tablet by mouth 2 times daily  Refills:  0     tiZANidine 2 MG tablet  Commonly known as:  ZANAFLEX  Used for:  DDD (degenerative disc disease), lumbar      Dose:  2 mg  Take 1 tablet (2 mg) by mouth every 8 hours as needed for muscle spasms  Refills:  0     vitamin D3 1000 units (25 mcg) tablet  Commonly known as:  CHOLECALCIFEROL  Used for:  Osteoporosis  without current pathological fracture, unspecified osteoporosis type      TAKE 1 TABLET BY MOUTH ONCE DAILY  Quantity:  31 tablet  Refills:  98         * This list has 4 medication(s) that are the same as other medications prescribed for you. Read the directions carefully, and ask your doctor or other care provider to review them with you.            CONTINUE these medicines which have NOT CHANGED       Dose / Directions   Blood Glucose Monitoring Suppl Aura      2 times daily Call nurse for further directions if blood glucose is less than 80 or greater than 250  Refills:  0     order for DME      Equipment being ordered: Oxygen at 2 lpm via nasal cannula continuous with portability. Dx: respiratory failure, COPD  Quantity:  1 Device  Refills:  0              Protect others around you: Learn how to safely use, store and throw away your medicines at www.disposemymeds.org.       Follow-ups after your visit       Your next 10 appointments already scheduled    Nov 15, 2019 11:00 AM [facility] with MARY Riley CNP  [facility] [address] [place] [id number] [phone]         Care Instructions       Further instructions from your care team       [facility]   Procedure Discharge Instructions    Today you saw:    Dr. Farhat Woodruff    You had an:  hip injection      Medications used:  Lidocaine   Bupivacaine   Dexamethasone Depo-medrol  Omnipaque             Be cautious when walking. Numbness and/or weakness in the lower extremities may occur for up to 6-8 hours after the procedure due to effect of the local anesthetic    Do not drive for 6 hours. The effect of the local anesthetic could slow your reflexes.     You may resume your regular activities after 24 hours    Avoid strenuous activity for the first 24 hours    You may shower, however avoid swimming, tub baths or hot tubs for 24 hours  following your procedure    You may have a mild to moderate increase in pain for several days following the injection.    It may take up to 14 days for the steroid medication to start working although you may feel the effect as early as a few days after the procedure.       You may use ice packs for 10-15 minutes, 3 to 4 times a day at the injection site for comfort    Do not use heat to painful areas for 6 to 8 hours. This will give the local anesthetic time to wear off and prevent you from accidentally burning your skin.     Unless you have been directed to avoid the use of anti-inflammatory medications (NSAIDS), you may use medications such as ibuprofen, Aleve or Tylenol for pain control if needed.     Possible side effects of steroids that you may experience include flushing, elevated blood pressure, increased appetite, mild headaches and restlessness.  All of these symptoms will get better with time.    If you experience any of the following, call the Pain Clinic during work hours (Mon-Friday 8-4:30 pm) at [phone] or the Provider Line after hours at [phone]:  -Fever over 100 degree F  -Swelling, bleeding, redness, drainage, warmth at the injection site  -Progressive weakness or numbness in your legs   -Loss of bowel or bladder function  -Unusual new onset of pain that is not improving          Additional Information About Your Visit       Care EveryWhere ID    This is your Care EveryWhere ID. This could be used by other organizations to access your [place] medical records  [id number]        Primary Care Provider Office Phone # Fax #    Junie MartitaMARY Wilder [phone]      Equal Access to Services    [facility] : Juan Marquez, wafloridalmada luqadaha, qaybta kaalmada emory, andrew jameson. So [facility] [phone].    ATENCIÓN: Si habla español, tiene a ortega disposición servicios gratuitos de asistencia lingüística. Llame al  [phone].    We comply with applicable federal civil rights laws and [place] laws. We do not discriminate on the basis of race, color, national origin, age, disability, sex, sexual orientation, or gender identity.           Thank you!    Thank you for choosing [facility] for your care. Our goal is always to provide you with excellent care. Hearing back from our patients is one way we can continue to improve our services. Please take a few minutes to complete the written survey that you may receive in the mail after you visit with us. Thank you!            Medication List      Medications          Morning Afternoon Evening Bedtime As Needed    Blood Glucose Monitoring Suppl Aura  INSTRUCTIONS:  2 times daily Call nurse for further directions if blood glucose is less than 80 or greater than 250                     order for DME  INSTRUCTIONS:  Equipment being ordered: Oxygen at 2 lpm via nasal cannula continuous with portability. Dx: respiratory failure, COPD                       ASK your doctor about these medications          Morning Afternoon Evening Bedtime As Needed    acetaminophen 500 MG tablet  Also known as:  TYLENOL  INSTRUCTIONS:  Take 1,000 mg by mouth 3 times daily                     aspirin 81 MG chewable tablet  Also known as:  ASA  INSTRUCTIONS:  Take 1 tablet (81 mg) by mouth daily                     atorvastatin 40 MG tablet  Also known as:  LIPITOR  INSTRUCTIONS:  TAKE 1 TABLET BY MOUTH ONCE DAILY                     BIOFREEZE 4 % Gel  INSTRUCTIONS:  Externally apply topically 4 times daily as needed To left shoulder and right hip  Generic drug:  Menthol (Topical Analgesic)                     bisacodyl 10 MG suppository  Also known as:  DULCOLAX  INSTRUCTIONS:  Place 10 mg rectally daily as needed for constipation                     BREO ELLIPTA 100-25 MCG/INH inhaler  INSTRUCTIONS:  INHALE 1 PUFF BY MOUTH ONCE DAILY  Generic drug:  fluticasone-vilanterol                     calcium  carbonate 500 MG chewable tablet  Also known as:  TUMS  INSTRUCTIONS:  Take 1 tablet (500 mg) by mouth 3 times daily                     DONEPEZIL HCL PO  INSTRUCTIONS:  Take 5 mg by mouth At Bedtime  Reason for med:  dementia                     * DULoxetine 30 MG capsule  Also known as:  CYMBALTA  INSTRUCTIONS:  Take 1 capsule (30 mg) by mouth At Bedtime                     * DULoxetine 60 MG capsule  Also known as:  CYMBALTA  INSTRUCTIONS:  Take 1 capsule (60 mg) by mouth every morning                     ferrous sulfate 325 (65 Fe) MG tablet  Also known as:  FEROSUL  INSTRUCTIONS:  Take 1 tablet (325 mg) by mouth daily                     fluticasone 50 MCG/ACT nasal spray  Also known as:  FLONASE  INSTRUCTIONS:  Spray 1 spray into both nostrils daily                     furosemide 20 MG tablet  Also known as:  LASIX  INSTRUCTIONS:  Take 1 tablet (20 mg) by mouth daily                     gabapentin 100 MG capsule  Also known as:  NEURONTIN  INSTRUCTIONS:  Take 2 capsules (200 mg) by mouth 2 times daily                     hypromellose-dextran 0.1-0.3 % ophthalmic solution  Also known as:  ARTIFICAL TEARS  INSTRUCTIONS:  Place 1 drop into both eyes every 6 hours as needed for dry eyes                     levalbuterol 1.25 MG/3ML neb solution  Also known as:  XOPENEX  INSTRUCTIONS:  Take 1 ampule by nebulization every 4 hours as needed for shortness of breath / dyspnea or wheezing And every 4 hours PRN                     Lidocaine 4 % Patch  Also known as:  LIDOCARE  INSTRUCTIONS:  Place 1 patch onto the skin daily as needed To desired area (shoulder or hip). On for 12hrs, off for 12hrs                     lisinopril 10 MG tablet  Also known as:  PRINIVIL/ZESTRIL  INSTRUCTIONS:  Take 1 tablet (10 mg) by mouth daily                     miconazole 2 % external powder  Also known as:  MICATIN/MICRO GUARD  INSTRUCTIONS:  Apply topically 2 times daily as needed for other (Intetriginous candidiasis)                      MILK OF MAGNESIA 400 MG/5ML suspension  INSTRUCTIONS:  Take 30 mLs by mouth daily as needed for constipation or heartburn  Generic drug:  magnesium hydroxide                     NIFEdipine ER 30 MG 24 hr tablet  Also known as:  ADALAT CC  INSTRUCTIONS:  Take 1 tablet (30 mg) by mouth daily                     OMEPRAZOLE PO  INSTRUCTIONS:  Take 40 mg by mouth every morning  Reason for med:  Gastroesophageal Reflux Disease                     ondansetron 4 MG tablet  Also known as:  ZOFRAN  INSTRUCTIONS:  Take 4 mg by mouth every 6 hours as needed for nausea                     * oxyCODONE 5 MG tablet  Also known as:  ROXICODONE  INSTRUCTIONS:  Take 1 tablet (5 mg) by mouth every 4 hours as needed for moderate to severe pain                     * oxyCODONE 10 MG 12 hr tablet  Also known as:  OXYCONTIN  INSTRUCTIONS:  Take 1 tablet (10 mg) by mouth every 12 hours                     senna-docusate 8.6-50 MG tablet  Also known as:  SENOKOT-S/PERICOLACE  INSTRUCTIONS:  Take 1 tablet by mouth 2 times daily                     tiZANidine 2 MG tablet  Also known as:  ZANAFLEX  INSTRUCTIONS:  Take 1 tablet (2 mg) by mouth every 8 hours as needed for muscle spasms                     vitamin D3 1000 units (25 mcg) tablet  Also known as:  CHOLECALCIFEROL  INSTRUCTIONS:  TAKE 1 TABLET BY MOUTH ONCE DAILY                        * This list has 4 medication(s) that are the same as other medications prescribed for you. Read the directions carefully, and ask your doctor or other care provider to review them with you.

## 2019-11-14 NOTE — DISCHARGE INSTRUCTIONS
[facility]   Procedure Discharge Instructions    Today you saw:    Dr. Farhat Woodruff    You had an:  hip injection      Medications used:  Lidocaine   Bupivacaine   Dexamethasone Depo-medrol  Omnipaque             Be cautious when walking. Numbness and/or weakness in the lower extremities may occur for up to 6-8 hours after the procedure due to effect of the local anesthetic    Do not drive for 6 hours. The effect of the local anesthetic could slow your reflexes.     You may resume your regular activities after 24 hours    Avoid strenuous activity for the first 24 hours    You may shower, however avoid swimming, tub baths or hot tubs for 24 hours following your procedure    You may have a mild to moderate increase in pain for several days following the injection.    It may take up to 14 days for the steroid medication to start working although you may feel the effect as early as a few days after the procedure.       You may use ice packs for 10-15 minutes, 3 to 4 times a day at the injection site for comfort    Do not use heat to painful areas for 6 to 8 hours. This will give the local anesthetic time to wear off and prevent you from accidentally burning your skin.     Unless you have been directed to avoid the use of anti-inflammatory medications (NSAIDS), you may use medications such as ibuprofen, Aleve or Tylenol for pain control if needed.     Possible side effects of steroids that you may experience include flushing, elevated blood pressure, increased appetite, mild headaches and restlessness.  All of these symptoms will get better with time.    If you experience any of the following, call the Pain Clinic during work hours (Mon-Friday 8-4:30 pm) at [phone] or the Provider Line after hours at [phone]:  -Fever over 100 degree F  -Swelling, bleeding, redness, drainage, warmth at the injection site  -Progressive weakness or numbness in your legs   -Loss of bowel or  bladder function  -Unusual new onset of pain that is not improving

## 2019-11-14 NOTE — IP AVS SNAPSHOT
[facility]  [address]  [facility] [address]  Phone:  [phone]  Fax:  [phone]                                    After Visit Summary   11/14/2019    Jazmin Clifton    MRN: [id number]           After Visit Summary Signature Page    I have received my discharge instructions, and my questions have been answered. I have discussed any challenges I see with this plan with the nurse or doctor.    ..........................................................................................................................................  Patient/Patient Representative Signature      ..........................................................................................................................................  Patient Representative Print Name and Relationship to Patient    ..................................................               ................................................  Date                                   Time    ..........................................................................................................................................  Reviewed by Signature/Title    ...................................................              ..............................................  Date                                               Time          22EPIC Rev 08/18

## 2019-11-14 NOTE — PROGRESS NOTES
Pre procedure Diagnosis: right hip arthropathy, right hip osteoarthritis, acute on chronic right hip pain  Post procedure Diagnosis: Same  Procedure performed: repeat right hip joint injection, fluoroscopically guided, contrast controlled  Anesthesia: local  Complications: none  Operators: Farhat Woodruff MD      Indications:   Jazmin Clifton is a 86 year old female was sent by MARY Padilla for repeat right hip joint injection.  They have a history of severe, acute on chronic right hip pain that is inhibiting range of motion, ambulation, and activity.  Exam shows tenderness at the anterior and posterior aspect of the right hip and at the right hip greater trochanter with severely limited ROM due to pain and they have tried conservative treatment including activity modifications, medications, and interventional procedures.     PELVIS WITH UNILATERAL HIP ONE VIEW RIGHT  5/23/2019 1:29 PM      HISTORY: Chronic pain of right hip.     COMPARISON: August 17, 2006     FINDINGS: Mild loss of joint space and spurring. There is no acute  fracture or dislocation. There are no worrisome bony lesions.     IMPRESSION: No acute osseous abnormality demonstrated.     VINNY AYALA MD     Options/alternatives, benefits and risks were discussed with the patient including but not limited to bleeding, infection, nerve injury, reaction to medications, lack of significant pain relief with injection and the patient verbalized understanding and wished to proceed.  Voluntary signed informed consent was obtained.      Vitals were reviewed: Yes  BP (!) 146/86   Pulse 84   Resp 16   Allergies were reviewed:  Yes   Medications were reviewed:  Yes   Pre-procedure pain score: 4/10     Procedure:  The patient's medical history, medications, and allergies were reviewed and reconciled.  After obtaining signed informed consent, the patient was brought into the procedure suite and was carefully positioned on the procedure table in a supine  position with a slight left lateral tilt and pillows beneath the right side of her back and beneath the knees to keep her as comfortable as possible for the procedure.   A Pause for the Cause was performed.  Patient was prepped and draped in the usual sterile fashion.      After identifying the right hip joint, a total of 2 ml of Lidocaine 1% was used to anesthetize the skin and subcutaneous tissue.  Care was taken to locate the femoral pulse, and a lateral approach was taken for the injection. A 25 gauge 3.5 inch spinal needle was advanced under intermittent fluoroscopy until it was felt to enter the Hip joint.  Aspiration was negative.   A total of 2.5 ml of Omnipaque 300 was injected, showing appropriate needle location and adequate spread into the intraarticular space and no intravascular uptake noted.  Omnipaque wasted:  7.5 ml.    Then, 5 ml of a mixture containing 4 ml of 0.5% Bupivicaine with 40 mg of Kenalog was injected into the joint without resistance. The needle was removed, the injection site cleaned and a sterile dressing was applied.  The patient tolerated the procedure well, and was taken to the recovery room.    Images were saved to PACS.     Post-procedure pain score: 2/10 with improved ROM without pain  Follow-up includes:   -f/u phone call in one week  -f/u with referring provider     Farhat Woodruff MD  [facility]

## 2019-11-14 NOTE — PROGRESS NOTES
Report given to [facility] staff. Voiced understanding. Patient aware. [facility] transportation here to return patient to her residence.

## 2019-11-15 NOTE — ED AVS SNAPSHOT
[facility]  [address] [facility] [address]  Phone:  [phone]  Fax:  [phone]                                    Jazmin lCifton   MRN: [id number]    Department:  [facility]   Date of Visit:  11/15/2019           After Visit Summary Signature Page    I have received my discharge instructions, and my questions have been answered. I have discussed any challenges I see with this plan with the nurse or doctor.    ..........................................................................................................................................  Patient/Patient Representative Signature      ..........................................................................................................................................  Patient Representative Print Name and Relationship to Patient    ..................................................               ................................................  Date                                   Time    ..........................................................................................................................................  Reviewed by Signature/Title    ...................................................              ..............................................  Date                                               Time          22EPIC Rev 08/18

## 2019-11-15 NOTE — ED PROVIDER NOTES
History     Chief Complaint   Patient presents with     Shoulder Pain     for 2 months, worse today     HPI  Jazmin Clifton is a 86 year old female, past medical history is significant for chronic pain syndrome, lymphedema, anemia, pubic rami fracture 11/2/2019, ASCVD, anemia of chronic renal failure stage IV, COPD, morbid obesity, impaired ambulation, chronic diastolic congestive heart failure CVA, cognitive impairment, hyperlipidemia, osteoporosis, normocytic anemia, restrictive lung disease, renovascular hypertension, presents to the emergency department from the TCU with concerns of 2 months of left-sided shoulder pain.  The patient presents from TCU accompanied by her son who was called this morning because she is having increased discomfort in her left shoulder.  Patient states that she fell about 2 months ago and has fractured her pelvis, did not specifically recall trauma to the left shoulder however states that it was swollen and tender and had presented for evaluation on 10/17/2019.  I reviewed that presentation with respect to the patient's shoulder and her evaluation at that time.  Imaging with CT of the chest demonstrated no acute abnormality in the area.  There was no other direct imaging such as plain films left shoulder.  Patient states that despite OxyContin and oxycodone for pain she has episodes of absolute agony where the pain is so severe she thinks she is going to die.  This is not associated with shortness of breath lightheadedness palpitations nausea or vomiting.  It is typically brought on by moving or shifting in bed or sitting up; makes the pain much worse.  Patient does not recall any trauma additionally since the time the original concern began.    Allergies:  Allergies   Allergen Reactions     Accupril      Ace Inhibitors Unknown     Accupril     Augmentin Unknown     Blood-Group Specific Substance Other (See Comments)     Patient has a Non-specific antibody. Blood Product orders may  be delayed.  Draw one red top and two purple top tubes for ALL Type and Screen/ Type and Crossmatch orders.      Levofloxacin Other (See Comments) and Muscle Pain (Myalgia)     Pt prescribed ciprofloxacin in 2018 (no rxn documented)         Morphine Other (See Comments)     hallucinations     Nitrofurantoin Nausea and Vomiting     Norvasc [Amlodipine Besylate] Swelling     Leg swelling       Quinapril Other (See Comments) and Unknown     Hypertension. 3.29.18 - Takes and tolerates lisinopril           Problem List:    Patient Active Problem List    Diagnosis Date Noted     Chronic pain syndrome 2019     Priority: Medium     Lymphedema 2019     Priority: Medium     Anemia, unspecified type 2019     Priority: Medium     Pubic ramus fracture (H) 2019     Priority: Medium     Acute dysfunction of right eustachian tube 2019     Priority: Medium     Fall 2019     Priority: Medium     Coronary artery disease of native heart with stable angina pectoris, unspecified vessel or lesion type (H) 2019     Priority: Medium     Sialadenitis 2019     Priority: Medium     Post-operative state 06/10/2019     Priority: Medium     Mass of urethra 2019     Priority: Medium     Gross hematuria 2019     Priority: Medium     Anemia of chronic renal failure, stage 4 (severe) (H) 2019     Priority: Medium     COPD exacerbation (H) 05/10/2019     Priority: Medium     Weakness 05/10/2019     Priority: Medium     Shortness of breath 2019     Priority: Medium     Health Care Home 2019     Priority: Medium     [facility] Care Coordinator  Mirian Weldon MA, LSW  [phone]    [facility] CARE PLAN SUMMARY    Member Name:  Jazmin Clifton    Address:  [address]  [facility] [address]    As of 19 (moved to another apt)  Phone: [phone] (home)    :  1932 Date of Assessment: 19  Change in condition visit  "   Health Plan:  [facility]  Health Plan Number: [id number] Medical Assistance Number: [id number]  Financial Worker:  [facility]   Case #:     [facility] Care Coordinator:  Mirian Weldon MA, AYANAW CC Phone:  [phone]  CC Fax:  [phone]   FVP Enrollment Date: 2/1/19 Case Mix:  D  Rate Cell:  E  Waiver Type: EW     Primary Emergency Contact:  Chi Clifton  Address: [address] [place] [address]  Mobile Phone: [phone]  Relation: Son    Secondary Emergency Contact: Lucius Clifton  Address: [address] [place] [address]   Home Phone: [phone]  Work Phone: [phone]  Relation: Son Language:  English  :  No   Health Care Agent/POA:   Advanced Directives/Living Will:     Primary Care Clinic/Phone/Fax:  [facility]/(p) [phone], (f) [phone] Primary Dx:  COPD J44.9  Secondary Dx: DDD M51.36  Cognitive Impairment G31.84   Primary Physician:  Junie Estrella   Height:  5' 3\"  Weight:  203 lbs   Specialty Physician:    Audiologist:     Eye Care Provider:   Dental Care Provider:  [facility]   [facility] [phone]   Other:        [facility] CURRENT SERVICES SUMMARY  Equipment owned/DME history:  Member son purchased electric wheelchair for member;    Member has scooter, lift chair, 3 wheeled walker   SERVICE TYPE/PROVIDER NAME/PHONE AUTH DATE FREQUENCY Units OR $ Amt DESCRIPTION   Medical Transportation: [id number] [phone]  Fax:  4-1-19 thru 3-30-20 review annually/PRN  as needed     Assisted Living: [facility] (Assisted Living) [phone] 24 hr Customized Living  Fax:  4-1-19 thru 3-30-20 review annually/PRN daily See RS/AL Tool      Supplies: [facility] [phone]  Fax:  4-1-19 thru 3-30-20 review annually/PRN   monthly 1 pull up per day   1 liner per day  XL pull ups     Regular liners      Parish RN - Sue B    [phone]   5-1-19 thru 3-30-20  as needed  Declined 6-1-19 "                    Acute kidney injury (H) 01/11/2019     Priority: Medium     Hip pain 10/29/2018     Priority: Medium     Impaired ambulation 10/29/2018     Priority: Medium     Morbid obesity (H) 06/19/2018     Priority: Medium     Multiple closed fractures of metatarsal bone 05/28/2018     Priority: Medium     Right groin mass 05/03/2018     Priority: Medium     Chronic diastolic congestive heart failure (H) 02/02/2018     Priority: Medium     History of stroke 02/02/2018     Priority: Medium     Mild cognitive impairment 09/22/2017     Priority: Medium     S/P carotid endarterectomy 09/06/2017     Priority: Medium     Underwent for symptomatic right carotid artery stenosis        Carotid stenosis, symptomatic w/o infarct, right 08/31/2017     Priority: Medium     Thyroid nodule 08/23/2017     Priority: Medium     Trigger point of extremity 08/23/2017     Priority: Medium     Trochanteric bursitis of right hip 08/23/2017     Priority: Medium     CKD (chronic kidney disease) stage 3, GFR 30-59 ml/min (H) 08/16/2017     Priority: Medium     Transient cerebral ischemia, unspecified type 08/01/2017     Priority: Medium     CVA (cerebral vascular accident) (H) 07/26/2017     Priority: Medium     Chronic obstructive pulmonary disease, unspecified COPD type (H) 07/25/2017     Priority: Medium     Generalized muscle weakness 07/25/2017     Priority: Medium     Dizziness 07/25/2017     Priority: Medium     Osteoarthritis of right hip, unspecified osteoarthritis type 07/25/2017     Priority: Medium     Essential hypertension 06/20/2017     Priority: Medium     Alzheimer's dementia without behavioral disturbance (H) 05/12/2017     Priority: Medium     Retention of urine 04/15/2017     Priority: Medium     History of intracranial aneurysm 04/14/2017     Priority: Medium     Postherpetic neuralgia 11/14/2016     Priority: Medium     Umbilical hernia without obstruction and without gangrene 06/03/2016     Priority: Medium      Spinal stenosis of lumbar region 01/11/2016     Priority: Medium     Spondylolisthesis, lumbar region 01/11/2016     Priority: Medium     BPPV (benign paroxysmal positional vertigo) 11/27/2014     Priority: Medium     Dyspepsia 11/27/2014     Priority: Medium     Female stress incontinence 11/27/2014     Priority: Medium     History of bradycardia 11/27/2014     Priority: Medium     History of DVT (deep vein thrombosis) 11/27/2014     Priority: Medium     History of herpes zoster 11/27/2014     Priority: Medium     Constipation 10/20/2012     Priority: Medium     Rectocele 08/31/2012     Priority: Medium     Hip joint replacement status 04/18/2012     Priority: Medium     Osteoarthritis 04/18/2012     Priority: Medium     DDD (degenerative disc disease), lumbar 04/18/2012     Priority: Medium     Mild major depression (H) 04/18/2012     Priority: Medium     Incontinence of urine 04/18/2012     Priority: Medium     Hyperlipidemia 12/07/2011     Priority: Medium     Osteoporosis 10/25/2011     Priority: Medium     S/P laminectomy 10/25/2011     Priority: Medium     CARDIOVASCULAR SCREENING; LDL GOAL LESS THAN 100 10/31/2010     Priority: Medium     Normocytic anemia 02/17/2010     Priority: Medium     CHF (congestive heart failure) (H) 09/17/2008     Priority: Medium     Diastolic disfunction - ECHP 2008       Restrictive lung disease 09/17/2008     Priority: Medium     PFT 4/2008       Renovascular hypertension 11/09/2006     Priority: Medium     Problem list name updated by automated process. Provider to review       Benign neoplasm of colon 10/04/2006     Priority: Medium     Angiodysplasia - due for repeat 10 years.  (2014)       Congenital cystic kidney disease 09/26/2006     Priority: Medium     10/6/06 Rob Juárez MD - [facility]  [phone], fax [phone] - needs labs faxed monthly  6/12/07 [facility] - Rob Juárez MD   RECOMMENDATIONS:CHRONIC KIDNEY DISEASE -3 Creatinine  1.0 down from 1.4 and 1.8  In the past, recent improvement most likely due to no expossure to NSAIDS in the recent weeks. NO edema. Continue current Therapy.HYPERTENSION: Dynacirc CR 10mg daily initiated today. Will continue adjusting blood pressure medicatins as needed until readings at target.VITAMIN D  DEFICIENCY - Completed therapy, discontinue ergocalciferol.ANEMIA, EPO DEFICIENCY - Hgb 10.2. Not on EPO. Continue observation for now. Recnet Hgb drop due to lumbar laminectomy.  Problem list name updated by automated process. Provider to review       Essential hypertension, benign 05/01/2006     Priority: Medium     Atherosclerosis of renal artery (H)      Priority: Medium     Left renal artery stenosis       Esophageal reflux      Priority: Medium     Gastroesophageal Reflux Disease       Cerebral aneurysm, nonruptured      Priority: Medium     Cerebral Aneurysm - unchanged on rcent MRI.  Seen by neurosurg.  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     Priority: Medium        Past Medical History:    Past Medical History:   Diagnosis Date     ABDOMINAL PAIN GENERALIZED 3/15/2006     Abdominal pain, generalized 3/15/2006     Atherosclerosis of renal artery (H)      BENIGN HYPERTENSION 5/1/2006     Benign neoplasm of scalp and skin of neck      Cerebral aneurysm, nonruptured      Congestive heart failure (H)      Depressive disorder, not elsewhere classified      Esophageal reflux      Female stress incontinence      Gastrointestinal malfunction arising from mental factors      Generalized osteoarthrosis, unspecified site      Herpes zoster dermatitis of eyelid      Insomnia, unspecified      Lumbago      Other chest pain      Other specified cardiac dysrhythmias(427.89)      Rectocele      Unspecified disorder of kidney and ureter       Unspecified essential hypertension        Past Surgical History:    Past Surgical History:   Procedure Laterality Date     CHOLECYSTECTOMY, LAPOROSCOPIC      Cholecystectomy, Laparoscopic     CYSTOSCOPY, BIOPSY URETHRA N/A 6/10/2019    Procedure: Cystoscopy With Biopsy, Removal Of Urethral Lesion;  Surgeon: Yesy Fong MD;  Location: [facility] OR     ENDARTERECTOMY CAROTID Right 2017    Procedure: ENDARTERECTOMY CAROTID;  RIGHT CAROTID ENDARTERECTOMY WITH EEG;  Surgeon: Hilario Parry MD;  Location: [facility] OR     HYSTERECTOMY, RAYMOND      oophorectomy,RAYMOND     SURGICAL HISTORY OF -       Laminectomy x 3     SURGICAL HISTORY OF -       MCA Aneurysm repair     SURGICAL HISTORY OF -       Bladder suspension (Bladder Repair x2)     SURGICAL HISTORY OF -       Bilateral Hand Surgeries for Arthritis x2     SURGICAL HISTORY OF -       Repair of a Cerebral Aneurysm     URETHROPLASTY N/A 6/10/2019    Procedure: Urethral Reconstruction;  Surgeon: Yesy Fong MD;  Location: [facility] OR       Family History:    Family History   Problem Relation Age of Onset     Cancer Mother         stomache     Cerebrovascular Disease Father      Heart Disease Father      Cancer Brother         lymphoma     Eye Disorder Son      Asthma Son      Diabetes Son      Gastrointestinal Disease Son         no control over bladder or bowels     C.A.D. No family hx of      Hypertension No family hx of      Breast Cancer No family hx of      Cancer - colorectal No family hx of      Prostate Cancer No family hx of        Social History:  Marital Status:   [5]  Social History     Tobacco Use     Smoking status: Former Smoker     Last attempt to quit: 1992     Years since quittin.8     Smokeless tobacco: Never Used   Substance Use Topics     Alcohol use: Yes     Comment: wine occas.     Drug use: No        Medications:    acetaminophen (TYLENOL) 500 MG tablet  aspirin (ASA) 81 MG chewable tablet  atorvastatin  (LIPITOR) 40 MG tablet  bisacodyl (DULCOLAX) 10 MG suppository  Blood Glucose Monitoring Suppl CORY  BREO ELLIPTA 100-25 MCG/INH inhaler  calcium carbonate (TUMS) 500 MG chewable tablet  DONEPEZIL HCL PO  DULoxetine (CYMBALTA) 30 MG capsule  DULoxetine (CYMBALTA) 60 MG capsule  ferrous sulfate (FEROSUL) 325 (65 Fe) MG tablet  fluticasone (FLONASE) 50 MCG/ACT nasal spray  furosemide (LASIX) 20 MG tablet  gabapentin (NEURONTIN) 100 MG capsule  HYDROmorphone (DILAUDID) 2 MG tablet  hypromellose-dextran (ARTIFICAL TEARS) 0.1-0.3 % ophthalmic solution  levalbuterol (XOPENEX) 1.25 MG/3ML neb solution  Lidocaine (LIDOCARE) 4 % Patch  lisinopril (PRINIVIL/ZESTRIL) 10 MG tablet  magnesium hydroxide (MILK OF MAGNESIA) 400 MG/5ML suspension  Menthol, Topical Analgesic, (BIOFREEZE) 4 % GEL  miconazole (MICATIN/MICRO GUARD) 2 % external powder  NIFEdipine ER (ADALAT CC) 60 MG 24 hr tablet  NIFEdipine ER OSMOTIC (ADALAT CC) 30 MG 24 hr tablet  OMEPRAZOLE PO  ondansetron (ZOFRAN) 4 MG tablet  order for DME  oxyCODONE (OXYCONTIN) 10 MG 12 hr tablet  PREDNISONE PO  senna-docusate (SENOKOT-S/PERICOLACE) 8.6-50 MG tablet  tiZANidine (ZANAFLEX) 2 MG tablet  VITAMIN D3 1000 units tablet          Review of Systems   All other systems reviewed and are negative.      Physical Exam   BP: (!) 181/87  Pulse: 78  Heart Rate: 87  Temp: 97.3  F (36.3  C)  Resp: 16  Weight: 86.6 kg (191 lb)  SpO2: 98 %      Physical Exam  Vitals signs and nursing note reviewed.   Constitutional:       General: She is in acute distress.      Appearance: Normal appearance.      Comments: Alert, appears uncomfortable, non-ill and nontoxic.   HENT:      Head: Normocephalic and atraumatic.      Right Ear: Tympanic membrane normal.      Left Ear: Tympanic membrane normal.      Nose: Nose normal.      Mouth/Throat:      Mouth: Mucous membranes are moist.   Eyes:      Extraocular Movements: Extraocular movements intact.      Conjunctiva/sclera: Conjunctivae normal.       Pupils: Pupils are equal, round, and reactive to light.   Neck:      Musculoskeletal: Normal range of motion and neck supple.   Cardiovascular:      Rate and Rhythm: Normal rate and regular rhythm.      Pulses: Normal pulses.      Heart sounds: Normal heart sounds.   Pulmonary:      Effort: Pulmonary effort is normal.      Breath sounds: Normal breath sounds.   Abdominal:      General: Abdomen is flat. Bowel sounds are normal.   Musculoskeletal:        Arms:    Skin:     General: Skin is warm and dry.      Capillary Refill: Capillary refill takes less than 2 seconds.   Neurological:      General: No focal deficit present.      Mental Status: She is alert and oriented to person, place, and time.   Psychiatric:         Mood and Affect: Mood normal.         Behavior: Behavior normal.         ED Course        Procedures                  EKG Interpretation:      Interpreted by Kenton Lou MD  Time reviewed: Time obtained 1040 time interpreted 1044.  Sinus rhythm 84 bpm.  No acute ST-T wave changes.  Normal axis, normal intervals.      Critical Care time:  none     Labs Ordered and Resulted from Time of ED Arrival Up to the Time of Departure from the ED   CBC WITH PLATELETS DIFFERENTIAL - Abnormal; Notable for the following components:       Result Value    RBC Count 3.77 (*)     Hemoglobin 10.4 (*)     Hematocrit 33.8 (*)     MCHC 30.8 (*)     Absolute Lymphocytes 0.6 (*)     All other components within normal limits   BASIC METABOLIC PANEL - Abnormal; Notable for the following components:    Glucose 134 (*)     GFR Estimate 49 (*)     GFR Estimate If Black 57 (*)     Calcium 10.2 (*)     All other components within normal limits   CRP INFLAMMATION - Abnormal; Notable for the following components:    CRP Inflammation 19.0 (*)     All other components within normal limits   INR   TROPONIN I   Study Result     LEFT SHOULDER TWO VIEWS  11/15/2019 12:12 PM      HISTORY: Increased pain in left shoulder x2 months.  Possible trauma.     COMPARISON: None.                                                                      IMPRESSION: Diffuse osteopenia. No evidence of acute fracture.     JUVENTINO ROME MD     Study Result     CT CHEST PULMONARY EMBOLISM W CONTRAST 11/15/2019 12:00 PM     HISTORY: Severe left scapular/left shoulder pain.  Left upper back  discomfort.  Shortness of breath.     COMPARISON: 2/20/2019     TECHNIQUE:  Chest CT was performed following intravenous  administration of 81 mL Isovue 370.  Radiation dose for this scan was  reduced using automated exposure control, adjustment of the mA and/or  kV according to patient size, or iterative reconstruction technique.     FINDINGS: No pulmonary embolus. No evidence of aortic dissection.  Cardiomegaly, similar to previous. No pericardial effusion. Focal  atelectasis in the right lung. No consolidation, effusion or  pneumothorax. No acute abnormality in the visualized upper abdomen.                                                                      IMPRESSION: No pulmonary embolus or other acute pulmonary abnormality.     JUVENTINO GORDON MD                   No results found for this or any previous visit (from the past 24 hour(s)).    Medications   HYDROmorphone (PF) (DILAUDID) injection 0.5 mg (0.5 mg Intravenous Given 11/15/19 1056)   ondansetron (ZOFRAN) injection 4 mg (4 mg Intravenous Given 11/15/19 1055)   iopamidol (ISOVUE-370) solution 81 mL (81 mLs Intravenous Given 11/15/19 1151)   sodium chloride 0.9 % bag 500mL for CT scan flush use (100 mLs Intravenous Given 11/15/19 1151)     1:45 PM  Recheck on the patient at this time finds her comfortable eating soup.  She feels that the Dilaudid was very helpful in reducing her left shoulder discomfort and notes that she can move it more easily with minimal discomfort now.  Her work-up here today in the form of CT chest pulmonary embolism as well as left shoulder x-ray is unremarkable and without evidence of acute  findings.  Lab diagnostics show no significant change from baseline or acute abnormals.  I discussed my plan for disposition back to the [facility] for her.  I would continue with her OxyContin 10 mg p.o. twice daily and discontinue her oxycodone and replace it with Dilaudid for as needed breakthrough pain medication.    Assessments & Plan (with Medical Decision Making)   Assessment and plan with medical decision making at the time stamp above.  The patient's return to her place of residence with a change in her breakthrough pain medication as discussed at the time stamp.  Follow-up is recommended this coming week with her primary care provider.      Disclaimer: This note consists of symbols derived from keyboarding, dictation and/or voice recognition software. As a result, there may be errors in the script that have gone undetected. Please consider this when interpreting information found in this chart.      I have reviewed the nursing notes.    I have reviewed the findings, diagnosis, plan and need for follow up with the patient.       Discharge Medication List as of 11/15/2019  2:23 PM      START taking these medications    Details   HYDROmorphone (DILAUDID) 2 MG tablet Take 1-2 tablets (2-4 mg) by mouth every 6 hours as needed for pain, Disp-10 tablet, R-0, Local Print             Final diagnoses:   Chronic left shoulder pain - Acute exacerbation       11/15/2019   [facility] EMERGENCY DEPARTMENT     Kenton Lou MD  11/18/19 [id number]

## 2019-11-15 NOTE — DISCHARGE INSTRUCTIONS
Discontinue oxycodone.  Dilaudid 2 mg tablet 1-2 tablets every 4 hours as needed for breakthrough pain.  Continue current dosing of OxyContin 10 mg p.o. twice daily.  Alternating cold pack/hot pack to left shoulder as needed.  Return to the emergency department if worse or changes.

## 2019-11-15 NOTE — ED NOTES
Pt here with complaints of left shoulder pain. The patient recently fell and has a pelvic fracture, and is currently in TCU at [facility].

## 2019-11-18 NOTE — LETTER
11/18/2019        RE: Jazmin Clifton  [address]  [facility] [place] [address]        [facility]  [facility] Record Number:  [id number]  Place of Service where encounter took place:  [facility] ([facility]) [[id number]]  Chief Complaint   Patient presents with     Hospital F/U       HPI:    Jazmin Clifton  is a 86 year old (12/30/1932), who is being seen today for an episodic care visit.  HPI information obtained from: facility chart records, facility staff, patient report and [facility] Epic chart review. Today's concern is:     Chronic left shoulder pain  Closed fracture of ramus of left pubis with routine healing, subsequent encounter  Closed fracture of superior ramus of left pubis with routine healing, subsequent encounter  Physical deconditioning  Essential hypertension  CKD (chronic kidney disease) stage 3, GFR 30-59 ml/min (H)  DDD (degenerative disc disease), lumbar  Impaired gait and mobility   Patient recently hospitalized s/p fall with pelvic fracture. Had been making slow progress with therapy. However AM of 11/15, patient reports severe, sharp pain to left shoulder and requested transfer to ED for evaluation. This pain has been an ongoing issues for the past several months. Initial injury earlier this year when resident fell into her while on the elevator. She re-injured arm when attempt to raise self off bed several weeks ago. She was noted to have hematoma of left bicep at that time. In ED shoulder x-ray negative, showed osteopenia.  Chest CT negative for PE. No s/s of ACS. Her PRN oxycodone was discontinued and she was discharged back to TCU on oral dilaudid.     She reports that she continues to have pain - dilaudid does help. States she is frustrated that they were unable to give her an answer about her pain. Discussed that ER is to r/o life threatening causes, and to treat acute issues, not to do work-up. Discussed that she would like need an MRI to  determine cause of her pain, but provider doubts she would be able to tolerate lying still on table for that long given chronic back pain and pain from pelvic fracture. She also stated she would not want any surgical treatment. She has no numbness or tingling in her arm, able to use hand, but has decreased ROM to left shoulder. She does follow with pain clinic - has not had any steroid injections to shoulder.     Past Medical and Surgical History reviewed in Epic today.    MEDICATIONS:  Current Outpatient Medications   Medication Sig Dispense Refill     acetaminophen (TYLENOL) 500 MG tablet Take 1,000 mg by mouth 3 times daily       aspirin (ASA) 81 MG chewable tablet Take 1 tablet (81 mg) by mouth daily       atorvastatin (LIPITOR) 40 MG tablet TAKE 1 TABLET BY MOUTH ONCE DAILY 30 tablet 98     bisacodyl (DULCOLAX) 10 MG suppository Place 10 mg rectally daily as needed for constipation       Blood Glucose Monitoring Suppl CORY 2 times daily Call nurse for further directions if blood glucose is less than 80 or greater than 250       BREO ELLIPTA 100-25 MCG/INH inhaler INHALE 1 PUFF BY MOUTH ONCE DAILY 1 Inhaler 11     calcium carbonate (TUMS) 500 MG chewable tablet Take 1 tablet (500 mg) by mouth 3 times daily       DONEPEZIL HCL PO Take 5 mg by mouth At Bedtime        DULoxetine (CYMBALTA) 30 MG capsule Take 1 capsule (30 mg) by mouth At Bedtime       DULoxetine (CYMBALTA) 60 MG capsule Take 1 capsule (60 mg) by mouth every morning       ferrous sulfate (FEROSUL) 325 (65 Fe) MG tablet Take 1 tablet (325 mg) by mouth daily       fluticasone (FLONASE) 50 MCG/ACT nasal spray Spray 1 spray into both nostrils daily       furosemide (LASIX) 20 MG tablet Take 1 tablet (20 mg) by mouth daily 30 tablet 1     gabapentin (NEURONTIN) 100 MG capsule Take 2 capsules (200 mg) by mouth 2 times daily 120 capsule 11     HYDROmorphone (DILAUDID) 2 MG tablet Take 1-2 tablets (2-4 mg) by mouth every 6 hours as needed for pain 20 tablet  "0     hypromellose-dextran (ARTIFICAL TEARS) 0.1-0.3 % ophthalmic solution Place 1 drop into both eyes every 6 hours as needed for dry eyes       levalbuterol (XOPENEX) 1.25 MG/3ML neb solution Take 1 ampule by nebulization every 4 hours as needed for shortness of breath / dyspnea or wheezing And every 4 hours PRN       Lidocaine (LIDOCARE) 4 % Patch Place 1 patch onto the skin daily To desired area (shoulder or hip). On for 12hrs, off for 12hrs       lisinopril (PRINIVIL/ZESTRIL) 10 MG tablet Take 1 tablet (10 mg) by mouth daily 30 tablet 1     magnesium hydroxide (MILK OF MAGNESIA) 400 MG/5ML suspension Take 30 mLs by mouth daily as needed for constipation or heartburn       Menthol, Topical Analgesic, (BIOFREEZE) 4 % GEL Externally apply topically 4 times daily as needed To left shoulder and right hip       miconazole (MICATIN/MICRO GUARD) 2 % external powder Apply topically 2 times daily as needed for other (Intetriginous candidiasis)       NIFEdipine ER (ADALAT CC) 60 MG 24 hr tablet Take 60 mg by mouth daily       OMEPRAZOLE PO Take 40 mg by mouth every morning       ondansetron (ZOFRAN) 4 MG tablet Take 4 mg by mouth every 6 hours as needed for nausea       order for DME Equipment being ordered: Oxygen at 2 lpm via nasal cannula continuous with portability. Dx: respiratory failure, COPD 1 Device 0     oxyCODONE (OXYCONTIN) 10 MG 12 hr tablet Take 1 tablet (10 mg) by mouth every 12 hours 30 tablet 0     PREDNISONE PO Take 40 mg by mouth daily       senna-docusate (SENOKOT-S/PERICOLACE) 8.6-50 MG tablet Take 1 tablet by mouth 2 times daily       VITAMIN D3 1000 units tablet TAKE 1 TABLET BY MOUTH ONCE DAILY 31 tablet 98         REVIEW OF SYSTEMS:  4 point ROS including Respiratory, CV, GI and , other than that noted in the HPI,  is negative    Objective:  BP (!) 187/93   Pulse 74   Temp 97.9  F (36.6  C)   Resp 18   Ht 1.6 m (5' 3\")   Wt 85.3 kg (188 lb)   SpO2 96%   BMI 33.30 kg/m     Exam:  GENERAL " APPEARANCE:  Alert, in no distress, cooperative  NECK:  No adenopathy,masses or thyromegaly, full ROM  RESP:  respiratory effort and palpation of chest normal, lungs clear to auscultation , no respiratory distress, on RA  CV:  Palpation and auscultation of heart done , regular rate and rhythm, no murmur, rub, or gallop, no edema, +2 pedal pulses  ABDOMEN:  no guarding or rebound, bowel sounds normal  M/S:   unsteady gait, decreased ROM to right hip, tenderness in small area to left lateral scapula, no masses to should palpable, decreased ROM, soft mass noted to right bicep at previous hematoma sight,   SKIN:  Inspection of skin and subcutaneous tissue baseline  NEURO:   Cranial nerves 2-12 are normal tested and grossly at patient's baseline  PSYCH:  oriented X 3, normal insight, judgement and memory, affect and mood normal, anxious at times    Labs:   Recent labs in [facility] reviewed by me today.  and   Most Recent 3 CBC's:  Recent Labs   Lab Test 11/15/19  1054 11/11/19  0715 11/06/19  0449   WBC 6.1 6.5 5.5   HGB 10.4* 9.1* 8.3*   MCV 90 91 91    210 155     Most Recent 3 BMP's:  Recent Labs   Lab Test 11/15/19  1054 11/11/19  0715 11/06/19  0449    138 141   POTASSIUM 4.4 4.1 4.9   CHLORIDE 109 107 108   CO2 27 29 29   BUN 24 28 32*   CR 1.02 1.05* 0.96   ANIONGAP 3 2* 4   BLAIR 10.2* 9.6 9.3   * 93 105*       ASSESSMENT/PLAN:  (M25.512,  G89.29) Chronic left shoulder pain  (primary encounter diagnosis)  Chronic pain syndrome  Comment: Acute worsening at the end of last week. Unclear etiology- no fracture. Could be cervical radiculopathy, bursitis, arthritis - thought suspect is soft tissue related. She does not want any surgical intervention. Had long discussion with patient she is likely not able to tolerated diagnostic imaging of MRI given her chronic back pain and her pelvic fractures. Also, discussed that as she would not want surgical intervention, goal of care is pain control, which can be  attempted with diagnosis. She does not appear to have an active infection. She does follow with the pain clinic, seen most recently on 11/1 and that note was reviewed by provider. No red flag symptoms.  Reviewed current pain regimen with patient. Discussed shoulder pain and plan of care with Dr. Yu.   Plan: Will try prednisone 40mg daily x 5 days, will change tizanidine from 2mg q8h PRN to q8h, continue OxyContin 10mg q 12 h, continue dilaudid 2-4mg q6h PRN, will schedule lidocaine patch daily to left shoulder, duloxetine 60mg q AM and 30mg at bedtime, tylenol 1000mg q8h. Gabapentin 200mg BID.  Continue PT and OT. PT and OT. Continue to monitor and adjust. May benefit from f/u visit with pain clinic.     (S32.542D) Closed fracture of ramus of left pubis with routine healing, subsequent encounter  (S32.512D) Closed fracture of superior ramus of left pubis with routine healing, subsequent encounter  (R53.81) Physical deconditioning  Comment: S/p fall at [facility]. Making very slow progress with therapy, but able to ambulate a few feet. Only able to walk very short distanced at baseline, uses scooter for mobility. Therapy is requesting WC as patient only expected to make minimal progress with therapy.   Plan: Pain regimen as above, PT and OT treat,  to assist with discharge planning. Will complete face to face for wheelchair.     (I10) Essential hypertension  Comment: Elevated, ? Pain vs actual elevation. Does have h/o CVA, CKD, CHF  Plan: Will increase nifedipine ER from 30mg  to 60mg daily, continue lisinopril 10mg daily, furosemide 20mg daily, ASA 81mg daily. Continue to monitor  And adjust      (N18.3) CKD (chronic kidney disease) stage 3, GFR 30-59 ml/min (H)  Comment: Stable, see labs.   Plan: Avoid nephrotoxic medications, BMP PRN to monitor for stability     transcribed by : Aliyah Milan  Orders:  1. Prednisone 40 mg PO q day x 5 days - dx: R shoulder pain  2. BGT BID x 5 days  3.  Change Tizanidine to 2 mg PO q 8 hrs - dx: Pain  4. Increase Nifedipine ER to 60 mg PO q day - dx: HTN    Total time spent with patient visit at the skilled nursing facility was 40 minutes including patient visit, review of past records and discussion of pain control plan via phone with MD. Greater than 50% of total time spent with counseling and coordinating care due to 25 minutes face to face wtih patient review current pain medication regimen and discussion of treatments that can be tried at TCU, recent pain clinic visit, goal of care, and discussion of patient's current mobility and progress with therapy given acute on chronic shoulder pain pelivce fracture, impaired gait, HTN; remainind spend in care coordination with PT, Nursing staff and MD. .  Electronically signed by:  MARY Gómez CNP       MEDICAL NECESSITY STATEMENT FOR DME    Demographic Information on Jazmin Clifton:    Jazmin Clifton  Gender: female  : 1932  [address]   [place] [address]  [phone] (home)     Medical Record: [id number]  Social Security Number: [id number]  Primary Care Provider: Junie Estrella  Insurance: Payor: MEDICA / Plan: "[employer] / Product Type: HMO /       R26.89 Impaired gait and mobility  G89.4 Chronic pain syndrome  232.592D Closed fracture of left pubic ramus    DME:  WHEELCHAIR: Standard [id number] with Roho cushion.  (TYPE, CUSHION)   Standard foot rest YES   Elevating leg rest NO   Dose patient use oxygen NO   Able to propel w/c YES, shelter can also assist (if no why? And is there someone who can?)   Mobility related ADL that are affected in the home impaired, unsteady gait; recurrent falls with pelvic fracture   The wheelchair is suitable and necessary for use in the patient's home and the  Home/rooms can accommodate the wheelchair.  YES   Reason why a cane or walker will not meet the patient's needs. (ie: balance,   Tolerance, level of  "assistance) Unsteady gait, h/o recurrent falls, poor activity tolerance - only able to walk ~10feet at longest distance   The patient has expressed willingness to use the wheelchair in the home and    Does have the physical and cognitive ability to maneuver the equipment or has a    Caregiver who is available, willing, and able to provide assistance in the home    With the wheelchair. YES    VITAL SIGNS:  Vitals: BP (!) 187/93   Pulse 74   Temp 97.9  F (36.6  C)   Resp 18   Ht 1.6 m (5' 3\")   Wt 85.3 kg (188 lb)   SpO2 96%   BMI 33.30 kg/m     BMI= Body mass index is 33.3 kg/m .         MEDICAL NECESSITY STATEMENT (describe reason to support DME here including length of need)  Given recurrent falls and impaired gait in the setting of recent pubic ramus fracture, left superior ramus fracture, chronic low back pain s/p 7 back surgeries and chronic pain syndrome patient would benefit from lifetime use of wheelchair. She is able to self propel wheelchair with her feet. She currently lives in an assisted living facility - building is able to accommodate her chair and staff can assist with chair if she is unable to self propel. She has verbalized willingness to use the chair. Chair is needed to improved her quality of life and participation in ADL's as due to pain and unsteady gait she is only able to ambulate up to 10 feet with a walker and requires significant assistance. She is cognitively able to manage the chair safely. She will need for lifetime use as no significant improvement is expected in her mobility despite pain control and physical therapy.     R26.89 Impaired gait and mobility  G89.4 Chronic pain syndrome  232.592D Closed fracture of left pubic ramus    ELECTRONICALLY SIGNED BY TANVIR CERTIFIED PROVIDER:  MARY Gómze CNP   NPI: [id number]  [facility]  [facility] [address], [address]  [place] [address]                Sincerely,        MARY Gómez CNP    "

## 2019-11-18 NOTE — PROGRESS NOTES
[facility]  [facility] Medical Record Number:  [id number]  Place of Service where encounter took place:  [facility] ([facility]) [[id number]]  Chief Complaint   Patient presents with     Hospital F/U       HPI:    Jazmin Clifton  is a 86 year old (12/30/1932), who is being seen today for an episodic care visit.  HPI information obtained from: facility chart records, facility staff, patient report and [facility] Epic chart review. Today's concern is:     Chronic left shoulder pain  Closed fracture of ramus of left pubis with routine healing, subsequent encounter  Closed fracture of superior ramus of left pubis with routine healing, subsequent encounter  Physical deconditioning  Essential hypertension  CKD (chronic kidney disease) stage 3, GFR 30-59 ml/min (H)  DDD (degenerative disc disease), lumbar  Impaired gait and mobility   Patient recently hospitalized s/p fall with pelvic fracture. Had been making slow progress with therapy. However AM of 11/15, patient reports severe, sharp pain to left shoulder and requested transfer to ED for evaluation. This pain has been an ongoing issues for the past several months. Initial injury earlier this year when resident fell into her while on the elevator. She re-injured arm when attempt to raise self off bed several weeks ago. She was noted to have hematoma of left bicep at that time. In ED shoulder x-ray negative, showed osteopenia.  Chest CT negative for PE. No s/s of ACS. Her PRN oxycodone was discontinued and she was discharged back to TCU on oral dilaudid.     She reports that she continues to have pain - dilaudid does help. States she is frustrated that they were unable to give her an answer about her pain. Discussed that ER is to r/o life threatening causes, and to treat acute issues, not to do work-up. Discussed that she would like need an MRI to determine cause of her pain, but provider doubts she would be able to tolerate lying still on table  for that long given chronic back pain and pain from pelvic fracture. She also stated she would not want any surgical treatment. She has no numbness or tingling in her arm, able to use hand, but has decreased ROM to left shoulder. She does follow with pain clinic - has not had any steroid injections to shoulder.     Past Medical and Surgical History reviewed in Epic today.    MEDICATIONS:  Current Outpatient Medications   Medication Sig Dispense Refill     acetaminophen (TYLENOL) 500 MG tablet Take 1,000 mg by mouth 3 times daily       aspirin (ASA) 81 MG chewable tablet Take 1 tablet (81 mg) by mouth daily       atorvastatin (LIPITOR) 40 MG tablet TAKE 1 TABLET BY MOUTH ONCE DAILY 30 tablet 98     bisacodyl (DULCOLAX) 10 MG suppository Place 10 mg rectally daily as needed for constipation       Blood Glucose Monitoring Suppl CORY 2 times daily Call nurse for further directions if blood glucose is less than 80 or greater than 250       BREO ELLIPTA 100-25 MCG/INH inhaler INHALE 1 PUFF BY MOUTH ONCE DAILY 1 Inhaler 11     calcium carbonate (TUMS) 500 MG chewable tablet Take 1 tablet (500 mg) by mouth 3 times daily       DONEPEZIL HCL PO Take 5 mg by mouth At Bedtime        DULoxetine (CYMBALTA) 30 MG capsule Take 1 capsule (30 mg) by mouth At Bedtime       DULoxetine (CYMBALTA) 60 MG capsule Take 1 capsule (60 mg) by mouth every morning       ferrous sulfate (FEROSUL) 325 (65 Fe) MG tablet Take 1 tablet (325 mg) by mouth daily       fluticasone (FLONASE) 50 MCG/ACT nasal spray Spray 1 spray into both nostrils daily       furosemide (LASIX) 20 MG tablet Take 1 tablet (20 mg) by mouth daily 30 tablet 1     gabapentin (NEURONTIN) 100 MG capsule Take 2 capsules (200 mg) by mouth 2 times daily 120 capsule 11     HYDROmorphone (DILAUDID) 2 MG tablet Take 1-2 tablets (2-4 mg) by mouth every 6 hours as needed for pain 20 tablet 0     hypromellose-dextran (ARTIFICAL TEARS) 0.1-0.3 % ophthalmic solution Place 1 drop into both  "eyes every 6 hours as needed for dry eyes       levalbuterol (XOPENEX) 1.25 MG/3ML neb solution Take 1 ampule by nebulization every 4 hours as needed for shortness of breath / dyspnea or wheezing And every 4 hours PRN       Lidocaine (LIDOCARE) 4 % Patch Place 1 patch onto the skin daily To desired area (shoulder or hip). On for 12hrs, off for 12hrs       lisinopril (PRINIVIL/ZESTRIL) 10 MG tablet Take 1 tablet (10 mg) by mouth daily 30 tablet 1     magnesium hydroxide (MILK OF MAGNESIA) 400 MG/5ML suspension Take 30 mLs by mouth daily as needed for constipation or heartburn       Menthol, Topical Analgesic, (BIOFREEZE) 4 % GEL Externally apply topically 4 times daily as needed To left shoulder and right hip       miconazole (MICATIN/MICRO GUARD) 2 % external powder Apply topically 2 times daily as needed for other (Intetriginous candidiasis)       NIFEdipine ER (ADALAT CC) 60 MG 24 hr tablet Take 60 mg by mouth daily       OMEPRAZOLE PO Take 40 mg by mouth every morning       ondansetron (ZOFRAN) 4 MG tablet Take 4 mg by mouth every 6 hours as needed for nausea       order for DME Equipment being ordered: Oxygen at 2 lpm via nasal cannula continuous with portability. Dx: respiratory failure, COPD 1 Device 0     oxyCODONE (OXYCONTIN) 10 MG 12 hr tablet Take 1 tablet (10 mg) by mouth every 12 hours 30 tablet 0     PREDNISONE PO Take 40 mg by mouth daily       senna-docusate (SENOKOT-S/PERICOLACE) 8.6-50 MG tablet Take 1 tablet by mouth 2 times daily       VITAMIN D3 1000 units tablet TAKE 1 TABLET BY MOUTH ONCE DAILY 31 tablet 98         REVIEW OF SYSTEMS:  4 point ROS including Respiratory, CV, GI and , other than that noted in the HPI,  is negative    Objective:  BP (!) 187/93   Pulse 74   Temp 97.9  F (36.6  C)   Resp 18   Ht 1.6 m (5' 3\")   Wt 85.3 kg (188 lb)   SpO2 96%   BMI 33.30 kg/m    Exam:  GENERAL APPEARANCE:  Alert, in no distress, cooperative  NECK:  No adenopathy,masses or thyromegaly, full " ROM  RESP:  respiratory effort and palpation of chest normal, lungs clear to auscultation , no respiratory distress, on RA  CV:  Palpation and auscultation of heart done , regular rate and rhythm, no murmur, rub, or gallop, no edema, +2 pedal pulses  ABDOMEN:  no guarding or rebound, bowel sounds normal  M/S:   unsteady gait, decreased ROM to right hip, tenderness in small area to left lateral scapula, no masses to should palpable, decreased ROM, soft mass noted to right bicep at previous hematoma sight,   SKIN:  Inspection of skin and subcutaneous tissue baseline  NEURO:   Cranial nerves 2-12 are normal tested and grossly at patient's baseline  PSYCH:  oriented X 3, normal insight, judgement and memory, affect and mood normal, anxious at times    Labs:   Recent labs in [facility] reviewed by me today.  and   Most Recent 3 CBC's:  Recent Labs   Lab Test 11/15/19  1054 11/11/19  0715 11/06/19  0449   WBC 6.1 6.5 5.5   HGB 10.4* 9.1* 8.3*   MCV 90 91 91    210 155     Most Recent 3 BMP's:  Recent Labs   Lab Test 11/15/19  1054 11/11/19  0715 11/06/19  0449    138 141   POTASSIUM 4.4 4.1 4.9   CHLORIDE 109 107 108   CO2 27 29 29   BUN 24 28 32*   CR 1.02 1.05* 0.96   ANIONGAP 3 2* 4   BLAIR 10.2* 9.6 9.3   * 93 105*       ASSESSMENT/PLAN:  (M25.512,  G89.29) Chronic left shoulder pain  (primary encounter diagnosis)  Chronic pain syndrome  Comment: Acute worsening at the end of last week. Unclear etiology- no fracture. Could be cervical radiculopathy, bursitis, arthritis - thought suspect is soft tissue related. She does not want any surgical intervention. Had long discussion with patient she is likely not able to tolerated diagnostic imaging of MRI given her chronic back pain and her pelvic fractures. Also, discussed that as she would not want surgical intervention, goal of care is pain control, which can be attempted with diagnosis. She does not appear to have an active infection. She does follow with the  pain clinic, seen most recently on 11/1 and that note was reviewed by provider. No red flag symptoms.  Reviewed current pain regimen with patient. Discussed shoulder pain and plan of care with Dr. Yu.   Plan: Will try prednisone 40mg daily x 5 days, will change tizanidine from 2mg q8h PRN to q8h, continue OxyContin 10mg q 12 h, continue dilaudid 2-4mg q6h PRN, will schedule lidocaine patch daily to left shoulder, duloxetine 60mg q AM and 30mg at bedtime, tylenol 1000mg q8h. Gabapentin 200mg BID.  Continue PT and OT. PT and OT. Continue to monitor and adjust. May benefit from f/u visit with pain clinic.     (S32.382D) Closed fracture of ramus of left pubis with routine healing, subsequent encounter  (S32.512D) Closed fracture of superior ramus of left pubis with routine healing, subsequent encounter  (R53.81) Physical deconditioning  Comment: S/p fall at [facility]. Making very slow progress with therapy, but able to ambulate a few feet. Only able to walk very short distanced at baseline, uses scooter for mobility. Therapy is requesting WC as patient only expected to make minimal progress with therapy.   Plan: Pain regimen as above, PT and OT treat,  to assist with discharge planning. Will complete face to face for wheelchair.     (I10) Essential hypertension  Comment: Elevated, ? Pain vs actual elevation. Does have h/o CVA, CKD, CHF  Plan: Will increase nifedipine ER from 30mg  to 60mg daily, continue lisinopril 10mg daily, furosemide 20mg daily, ASA 81mg daily. Continue to monitor  And adjust      (N18.3) CKD (chronic kidney disease) stage 3, GFR 30-59 ml/min (H)  Comment: Stable, see labs.   Plan: Avoid nephrotoxic medications, BMP PRN to monitor for stability     transcribed by : Aliyah Milan  Orders:  1. Prednisone 40 mg PO q day x 5 days - dx: R shoulder pain  2. BGT BID x 5 days  3. Change Tizanidine to 2 mg PO q 8 hrs - dx: Pain  4. Increase Nifedipine ER to 60 mg PO q day - dx:  HTN    Total time spent with patient visit at the skilled nursing facility was 40 minutes including patient visit, review of past records and discussion of pain control plan via phone with MD. Greater than 50% of total time spent with counseling and coordinating care due to 25 minutes face to face wtih patient review current pain medication regimen and discussion of treatments that can be tried at TCU, recent pain clinic visit, goal of care, and discussion of patient's current mobility and progress with therapy given acute on chronic shoulder pain pelivce fracture, impaired gait, HTN; remainind spend in care coordination with PT, Nursing staff and MD. .  Electronically signed by:  MARY Gómez CNP       MEDICAL NECESSITY STATEMENT FOR DME    Demographic Information on Jazmin Clifton:    Jazmin Clifton  Gender: female  : 1932  [address] [facility]   [facility] [facility] [address]  [phone] (home)     Medical Record: [id number]  Social Security Number: [id number]  Primary Care Provider: Junie Estrella  Insurance: Payor: MEDICA / Plan: [employer] / Product Type: HMO /       R26.89 Impaired gait and mobility  G89.4 Chronic pain syndrome  232.592D Closed fracture of left pubic ramus    DME:  WHEELCHAIR: Standard [id number] with Roho cushion.  (TYPE, CUSHION)   Standard foot rest YES   Elevating leg rest NO   Dose patient use oxygen NO   Able to propel w/c YES, detention can also assist (if no why? And is there someone who can?)   Mobility related ADL that are affected in the home impaired, unsteady gait; recurrent falls with pelvic fracture   The wheelchair is suitable and necessary for use in the patient's home and the  Home/rooms can accommodate the wheelchair.  YES   Reason why a cane or walker will not meet the patient's needs. (ie: balance,   Tolerance, level of assistance) Unsteady gait, h/o recurrent falls, poor activity tolerance - only able to walk ~10feet at  "longest distance   The patient has expressed willingness to use the wheelchair in the home and    Does have the physical and cognitive ability to maneuver the equipment or has a    Caregiver who is available, willing, and able to provide assistance in the home    With the wheelchair. YES    VITAL SIGNS:  Vitals: BP (!) 187/93   Pulse 74   Temp 97.9  F (36.6  C)   Resp 18   Ht 1.6 m (5' 3\")   Wt 85.3 kg (188 lb)   SpO2 96%   BMI 33.30 kg/m    BMI= Body mass index is 33.3 kg/m .         MEDICAL NECESSITY STATEMENT (describe reason to support DME here including length of need)  Given recurrent falls and impaired gait in the setting of recent pubic ramus fracture, left superior ramus fracture, chronic low back pain s/p 7 back surgeries and chronic pain syndrome patient would benefit from lifetime use of wheelchair. She is able to self propel wheelchair with her feet. She currently lives in an assisted living facility - building is able to accommodate her chair and staff can assist with chair if she is unable to self propel. She has verbalized willingness to use the chair. Chair is needed to improved her quality of life and participation in ADL's as due to pain and unsteady gait she is only able to ambulate up to 10 feet with a walker and requires significant assistance. She is cognitively able to manage the chair safely. She will need for lifetime use as no significant improvement is expected in her mobility despite pain control and physical therapy.     R26.89 Impaired gait and mobility  G89.4 Chronic pain syndrome  232.592D Closed fracture of left pubic ramus    ELECTRONICALLY SIGNED BY TANVIR CERTIFIED PROVIDER:  MARY Gómez CNP   NPI: [id number]  [facility]  [address] [place] [address], [address]  [place] [address]            "

## 2019-11-20 NOTE — PROGRESS NOTES
11-20-19   CC f/u with BONITA at the TCU about member progress.  She stated that member is still dealing with a great deal of pain.  Member was in the ED over the weekend as a result of the pain as well.    SW said that she is still working with therapy and she is not sure but is working towards discharge the first week in December.      CC also then shared that member had received AVS form and CC was unsure who was working with member at this time with her mail and bills.  SW at the TCU asked CC to forward this to her and she will discuss this with member. CC will scan the letter and forward on to Christine to review with member.     CC will then follow-up as needed.   Mirian Weldon MA [facility] Coordinator   [phone]

## 2019-11-21 NOTE — PROGRESS NOTES
[place]  [facility] Medical Record Number:  [id number]  Place of Service where encounter took place:  [facility] ([facility]) [[id number]]  Chief Complaint   Patient presents with     RECHECK       HPI:    Jazmin Clifton  is a 86 year old (12/30/1932), who is being seen today for an episodic care visit.  HPI information obtained from: facility chart records, facility staff, patient report and [facility] chart review. Today's concern is:     Chronic left shoulder pain  Chronic pain syndrome  Essential hypertension   Patient seen today to f/u on pain. Was started on steroid burst for left shoulder pain. She does not feel that steroids have made much different. However therapy report significant improvement in mobility and participation in therapy. They also note increased use of arm and that she has been reaching for more objects. She has not used any PRN dilaudid in past 48 hours.     During exam, was having patient rotate neck, when she developed sever pain in left shoulder after rotating head to the right. She stated pain was 10/10 - she was given heat pack and PRN dilaudid with some improvement in her pain.     Past Medical and Surgical History reviewed in Epic today.    MEDICATIONS:  Current Outpatient Medications   Medication Sig Dispense Refill     acetaminophen (TYLENOL) 500 MG tablet Take 1,000 mg by mouth 3 times daily       aspirin (ASA) 81 MG chewable tablet Take 1 tablet (81 mg) by mouth daily       atorvastatin (LIPITOR) 40 MG tablet TAKE 1 TABLET BY MOUTH ONCE DAILY 30 tablet 98     bisacodyl (DULCOLAX) 10 MG suppository Place 10 mg rectally daily as needed for constipation       Blood Glucose Monitoring Suppl CORY 2 times daily Call nurse for further directions if blood glucose is less than 80 or greater than 250       BREO ELLIPTA 100-25 MCG/INH inhaler INHALE 1 PUFF BY MOUTH ONCE DAILY 1 Inhaler 11     calcium carbonate (TUMS) 500 MG chewable tablet Take 1 tablet  (500 mg) by mouth 3 times daily       DONEPEZIL HCL PO Take 5 mg by mouth At Bedtime        DULoxetine (CYMBALTA) 60 MG capsule Take 1 capsule (60 mg) by mouth every morning       ferrous sulfate (FEROSUL) 325 (65 Fe) MG tablet Take 1 tablet (325 mg) by mouth daily       fluticasone (FLONASE) 50 MCG/ACT nasal spray Spray 1 spray into both nostrils daily       furosemide (LASIX) 20 MG tablet Take 1 tablet (20 mg) by mouth daily 30 tablet 1     gabapentin (NEURONTIN) 100 MG capsule Take 2 capsules (200 mg) by mouth 2 times daily 120 capsule 11     gabapentin (NEURONTIN) 300 MG capsule Take 300 mg by mouth 2 times daily Hold for sedation or confusoin       HYDROmorphone (DILAUDID) 2 MG tablet Take 1-2 tablets (2-4 mg) by mouth every 6 hours as needed for pain 20 tablet 0     hypromellose-dextran (ARTIFICAL TEARS) 0.1-0.3 % ophthalmic solution Place 1 drop into both eyes every 6 hours as needed for dry eyes       levalbuterol (XOPENEX) 1.25 MG/3ML neb solution Take 1 ampule by nebulization every 4 hours as needed for shortness of breath / dyspnea or wheezing And every 4 hours PRN       Lidocaine (LIDOCARE) 4 % Patch Place 1 patch onto the skin daily To desired area (shoulder or hip). On for 12hrs, off for 12hrs       lisinopril (PRINIVIL/ZESTRIL) 10 MG tablet Take 1 tablet (10 mg) by mouth daily 30 tablet 1     magnesium hydroxide (MILK OF MAGNESIA) 400 MG/5ML suspension Take 30 mLs by mouth daily as needed for constipation or heartburn       Menthol, Topical Analgesic, (BIOFREEZE) 4 % GEL Externally apply topically 4 times daily as needed To left shoulder and right hip       miconazole (MICATIN/MICRO GUARD) 2 % external powder Apply topically 2 times daily as needed for other (Intetriginous candidiasis)       NIFEdipine ER (ADALAT CC) 60 MG 24 hr tablet Take 60 mg by mouth daily       OMEPRAZOLE PO Take 40 mg by mouth every morning       ondansetron (ZOFRAN) 4 MG tablet Take 4 mg by mouth every 6 hours as needed for  "nausea       order for DME Equipment being ordered: Oxygen at 2 lpm via nasal cannula continuous with portability. Dx: respiratory failure, COPD 1 Device 0     oxyCODONE (OXYCONTIN) 10 MG 12 hr tablet Take 1 tablet (10 mg) by mouth every 12 hours 30 tablet 0     PREDNISONE PO Take 40 mg by mouth daily       senna-docusate (SENOKOT-S/PERICOLACE) 8.6-50 MG tablet Take 1 tablet by mouth 2 times daily       VITAMIN D3 1000 units tablet TAKE 1 TABLET BY MOUTH ONCE DAILY 31 tablet 98     DULoxetine (CYMBALTA) 30 MG capsule Take 1 capsule (30 mg) by mouth At Bedtime           REVIEW OF SYSTEMS:  4 point ROS including Respiratory, CV, GI and , other than that noted in the HPI,  is negative    Objective:  /74   Pulse 69   Temp 98.3  F (36.8  C)   Resp 18   Ht 1.6 m (5' 3\")   Wt 84.8 kg (187 lb)   SpO2 94%   BMI 33.13 kg/m    Exam:  GENERAL APPEARANCE:  Alert, in no distress, oriented, cooperative  RESP:  respiratory effort and palpation of chest normal, lungs clear to auscultation , no respiratory distress  CV:  Palpation and auscultation of heart done , regular rate and rhythm, no murmur, rub, or gallop  ABDOMEN:  no guarding or rebound, bowel sounds normal  M/S:   Gait and station abnormal unsteady gait, short steps, left hip tenderness, decreased ROM toleft shoulder mild tenderness to mucles to left shoulder blaed pain with ROM to neck  SKIN:  Inspection of skin and subcutaneous tissue baseline  NEURO:   Cranial nerves 2-12 are normal tested and grossly at patient's baseline  PSYCH:  oriented X 3, normal insight, judgement and memory, affect and mood normal    Labs:   Recent labs in [facility] reviewed by me today.     ASSESSMENT/PLAN:  (M25.512,  G89.29) Chronic left shoulder pain  (primary encounter diagnosis)  (G89.4) Chronic pain syndrome  Comment: Pain/mobility appears improved with steroids. ? Shoulder pain musculer vs. Cervicalgia. BUE 5/5 , no numbness or tingling in arms, thought shoulder blade pain " "and \"shooting\" arm pain after numbness. Suspect anxiety/psychgenic component to pain.   Plan: Will increase gabapenting to 300mg BID. Will reduce steroids to 20mg daily after completed steroid burst, tizanidine from 2mg q8h, continue OxyContin 10mg q 12 h, continue dilaudid 2-4mg q4h PRN, lidocaine patch daily to left shoulder, duloxetine 60mg q AM and 30mg at bedtime, tylenol 1000mg q8h. PT/OT, continue to eval and treat.     (I10) Essential hypertension  Comment: Variable 130's-180's, suspect pain is contributing. Nifedipine recently increased.   Plan: nifedipine ER 60mg daily, continue lisinopril 10mg daily, furosemide 20mg daily, ASA 81mg daily. Continue to monitor  And adjust      transcribed by : Aliyah Milan  Orders:  1. After current Prednisone 40 mg daily complete - start Prednisone 20 mg PO q day - dx: shoulder pain  2. Increase Gabapentin to 300 mg PO BID. Hold for sedation or confusion - dx: ODD    Electronically signed by:  MARY Gómez CNP         "

## 2019-11-22 NOTE — LETTER
11/22/2019        RE: Jazmin Clifton  [address]  [facility] [address]        [place] [facility]  [place] [facility] Record Number:  [id number]  Place of Service where encounter took place:  [facility] [place] [facility] ([facility]) [[id number]]  Chief Complaint   Patient presents with     RECHECK       HPI:    Jazmin Clifton  is a 86 year old (12/30/1932), who is being seen today for an episodic care visit.  HPI information obtained from: facility chart records, facility staff, patient report and [facility] chart review. Today's concern is:     Chronic left shoulder pain  Chronic pain syndrome  Essential hypertension   Patient seen today to f/u on pain. Was started on steroid burst for left shoulder pain. She does not feel that steroids have made much different. However therapy report significant improvement in mobility and participation in therapy. They also note increased use of arm and that she has been reaching for more objects. She has not used any PRN dilaudid in past 48 hours.     During exam, was having patient rotate neck, when she developed sever pain in left shoulder after rotating head to the right. She stated pain was 10/10 - she was given heat pack and PRN dilaudid with some improvement in her pain.     Past Medical and Surgical History reviewed in Epic today.    MEDICATIONS:  Current Outpatient Medications   Medication Sig Dispense Refill     acetaminophen (TYLENOL) 500 MG tablet Take 1,000 mg by mouth 3 times daily       aspirin (ASA) 81 MG chewable tablet Take 1 tablet (81 mg) by mouth daily       atorvastatin (LIPITOR) 40 MG tablet TAKE 1 TABLET BY MOUTH ONCE DAILY 30 tablet 98     bisacodyl (DULCOLAX) 10 MG suppository Place 10 mg rectally daily as needed for constipation       Blood Glucose Monitoring Suppl [facility] 2 times daily Call nurse for further directions if blood glucose is less than 80 or greater than 250       BREO ELLIPTA 100-25 MCG/INH inhaler INHALE 1 PUFF BY  MOUTH ONCE DAILY 1 Inhaler 11     calcium carbonate (TUMS) 500 MG chewable tablet Take 1 tablet (500 mg) by mouth 3 times daily       DONEPEZIL HCL PO Take 5 mg by mouth At Bedtime        DULoxetine (CYMBALTA) 60 MG capsule Take 1 capsule (60 mg) by mouth every morning       ferrous sulfate (FEROSUL) 325 (65 Fe) MG tablet Take 1 tablet (325 mg) by mouth daily       fluticasone (FLONASE) 50 MCG/ACT nasal spray Spray 1 spray into both nostrils daily       furosemide (LASIX) 20 MG tablet Take 1 tablet (20 mg) by mouth daily 30 tablet 1     gabapentin (NEURONTIN) 100 MG capsule Take 2 capsules (200 mg) by mouth 2 times daily 120 capsule 11     gabapentin (NEURONTIN) 300 MG capsule Take 300 mg by mouth 2 times daily Hold for sedation or confusoin       HYDROmorphone (DILAUDID) 2 MG tablet Take 1-2 tablets (2-4 mg) by mouth every 6 hours as needed for pain 20 tablet 0     hypromellose-dextran (ARTIFICAL TEARS) 0.1-0.3 % ophthalmic solution Place 1 drop into both eyes every 6 hours as needed for dry eyes       levalbuterol (XOPENEX) 1.25 MG/3ML neb solution Take 1 ampule by nebulization every 4 hours as needed for shortness of breath / dyspnea or wheezing And every 4 hours PRN       Lidocaine (LIDOCARE) 4 % Patch Place 1 patch onto the skin daily To desired area (shoulder or hip). On for 12hrs, off for 12hrs       lisinopril (PRINIVIL/ZESTRIL) 10 MG tablet Take 1 tablet (10 mg) by mouth daily 30 tablet 1     magnesium hydroxide (MILK OF MAGNESIA) 400 MG/5ML suspension Take 30 mLs by mouth daily as needed for constipation or heartburn       Menthol, Topical Analgesic, (BIOFREEZE) 4 % GEL Externally apply topically 4 times daily as needed To left shoulder and right hip       miconazole (MICATIN/MICRO GUARD) 2 % external powder Apply topically 2 times daily as needed for other (Intetriginous candidiasis)       NIFEdipine ER (ADALAT CC) 60 MG 24 hr tablet Take 60 mg by mouth daily       OMEPRAZOLE PO Take 40 mg by mouth every  "morning       ondansetron (ZOFRAN) 4 MG tablet Take 4 mg by mouth every 6 hours as needed for nausea       order for DME Equipment being ordered: Oxygen at 2 lpm via nasal cannula continuous with portability. Dx: respiratory failure, COPD 1 Device 0     oxyCODONE (OXYCONTIN) 10 MG 12 hr tablet Take 1 tablet (10 mg) by mouth every 12 hours 30 tablet 0     PREDNISONE PO Take 40 mg by mouth daily       senna-docusate (SENOKOT-S/PERICOLACE) 8.6-50 MG tablet Take 1 tablet by mouth 2 times daily       VITAMIN D3 1000 units tablet TAKE 1 TABLET BY MOUTH ONCE DAILY 31 tablet 98     DULoxetine (CYMBALTA) 30 MG capsule Take 1 capsule (30 mg) by mouth At Bedtime           REVIEW OF SYSTEMS:  4 point ROS including Respiratory, CV, GI and , other than that noted in the HPI,  is negative    Objective:  /74   Pulse 69   Temp 98.3  F (36.8  C)   Resp 18   Ht 1.6 m (5' 3\")   Wt 84.8 kg (187 lb)   SpO2 94%   BMI 33.13 kg/m     Exam:  GENERAL APPEARANCE:  Alert, in no distress, oriented, cooperative  RESP:  respiratory effort and palpation of chest normal, lungs clear to auscultation , no respiratory distress  CV:  Palpation and auscultation of heart done , regular rate and rhythm, no murmur, rub, or gallop  ABDOMEN:  no guarding or rebound, bowel sounds normal  M/S:   Gait and station abnormal unsteady gait, short steps, left hip tenderness, decreased ROM toleft shoulder mild tenderness to mucles to left shoulder blaed pain with ROM to neck  SKIN:  Inspection of skin and subcutaneous tissue baseline  NEURO:   Cranial nerves 2-12 are normal tested and grossly at patient's baseline  PSYCH:  oriented X 3, normal insight, judgement and memory, affect and mood normal    Labs:   Recent labs in [facility] reviewed by me today.     ASSESSMENT/PLAN:  (M25.512,  G89.29) Chronic left shoulder pain  (primary encounter diagnosis)  (G89.4) Chronic pain syndrome  Comment: Pain/mobility appears improved with steroids. ? Shoulder pain " "musculer vs. Cervicalgia. BUE 5/5 , no numbness or tingling in arms, thought shoulder blade pain and \"shooting\" arm pain after numbness. Suspect anxiety/psychgenic component to pain.   Plan: Will increase gabapenting to 300mg BID. Will reduce steroids to 20mg daily after completed steroid burst, tizanidine from 2mg q8h, continue OxyContin 10mg q 12 h, continue dilaudid 2-4mg q4h PRN, lidocaine patch daily to left shoulder, duloxetine 60mg q AM and 30mg at bedtime, tylenol 1000mg q8h. PT/OT, continue to eval and treat.     (I10) Essential hypertension  Comment: Variable 130's-180's, suspect pain is contributing. Nifedipine recently increased.   Plan: nifedipine ER 60mg daily, continue lisinopril 10mg daily, furosemide 20mg daily, ASA 81mg daily. Continue to monitor  And adjust      transcribed by : Aliyah Milan  Orders:  1. After current Prednisone 40 mg daily complete - start Prednisone 20 mg PO q day - dx: shoulder pain  2. Increase Gabapentin to 300 mg PO BID. Hold for sedation or confusion - dx: ODD    Electronically signed by:  MARY Gómez CNP             Sincerely,        MARY Gómez CNP    "

## 2019-11-23 NOTE — PROGRESS NOTES
"[place] [facility]  [place] Medical Record Number:  [id number]  Place of Service where encounter took place:  [facility] ([facility]) [[id number]]  Chief Complaint   Patient presents with     RECHECK       HPI:    Jazmin Clifton  is a 86 year old (12/30/1932), who is being seen today for an episodic care visit.  HPI information obtained from: facility chart records, facility staff, patient report and [facility] chart review. Today's concern is:     Chronic left shoulder pain  DDD (degenerative disc disease), lumbar  Chronic pain syndrome  Chronic diastolic congestive heart failure (H)  Essential hypertension  Closed fracture of ramus of left pubis with routine healing, subsequent encounter  Closed fracture of superior ramus of left pubis with routine healing, subsequent encounter  Physical deconditioning   Recently hospitalized after fall with pubic fracture. Has had issues with chronic left shoulder pain since admission to TCU. Was started on steroid last week, she is not sure she has had much pain improvement but staff have noted decreased use of PRN dilaudid and note that she was able use the arm some over the weekend. Did have severe pain last Friday after rotating her heat to the right, states pain now only intermittent, none as severe as Friday as occurred. Has been able to participate in therapy.     Had fall early this AM - was attempted to self transfer off the toilet when legs lost \"gave out\" and she slid to the floor. Staff was able to assist her off the floor and back to bed. She reports she had some mild right hip pain initially, but states that is now resolved and has no new pain and states she is moving at her baseline mobility.     Reports she is having regular bowel movements, is eating well. No chest pain or SOB. Sleeping well at night. Has no other concerns today.     Past Medical and Surgical History reviewed in Epic today.    MEDICATIONS:  Current Outpatient Medications "   Medication Sig Dispense Refill     acetaminophen (TYLENOL) 500 MG tablet Take 1,000 mg by mouth 3 times daily       aspirin (ASA) 81 MG chewable tablet Take 1 tablet (81 mg) by mouth daily       atorvastatin (LIPITOR) 40 MG tablet TAKE 1 TABLET BY MOUTH ONCE DAILY 30 tablet 98     bisacodyl (DULCOLAX) 10 MG suppository Place 10 mg rectally daily as needed for constipation       Blood Glucose Monitoring Suppl [facility] 2 times daily Call nurse for further directions if blood glucose is less than 80 or greater than 250       BREO ELLIPTA 100-25 MCG/INH inhaler INHALE 1 PUFF BY MOUTH ONCE DAILY 1 Inhaler 11     calcium carbonate (TUMS) 500 MG chewable tablet Take 1 tablet (500 mg) by mouth 3 times daily       DONEPEZIL HCL PO Take 5 mg by mouth At Bedtime        DULoxetine (CYMBALTA) 30 MG capsule Take 1 capsule (30 mg) by mouth At Bedtime       DULoxetine (CYMBALTA) 60 MG capsule Take 1 capsule (60 mg) by mouth every morning       ferrous sulfate (FEROSUL) 325 (65 Fe) MG tablet Take 1 tablet (325 mg) by mouth daily       fluticasone (FLONASE) 50 MCG/ACT nasal spray Spray 1 spray into both nostrils daily       furosemide (LASIX) 20 MG tablet Take 1 tablet (20 mg) by mouth daily 30 tablet 1     gabapentin (NEURONTIN) 300 MG capsule Take 300 mg by mouth 2 times daily Hold for sedation or confusoin       HYDROmorphone (DILAUDID) 2 MG tablet Take 1-2 tablets (2-4 mg) by mouth every 6 hours as needed for pain 20 tablet 0     hypromellose-dextran (ARTIFICAL TEARS) 0.1-0.3 % ophthalmic solution Place 1 drop into both eyes every 6 hours as needed for dry eyes       levalbuterol (XOPENEX) 1.25 MG/3ML neb solution Take 1 ampule by nebulization every 4 hours as needed for shortness of breath / dyspnea or wheezing And every 4 hours PRN       Lidocaine (LIDOCARE) 4 % Patch Place 1 patch onto the skin daily To desired area (shoulder or hip). On for 12hrs, off for 12hrs       lisinopril (PRINIVIL/ZESTRIL) 10 MG tablet Take 1 tablet (10  "mg) by mouth daily 30 tablet 1     magnesium hydroxide (MILK OF MAGNESIA) 400 MG/5ML suspension Take 30 mLs by mouth daily as needed for constipation or heartburn       Menthol, Topical Analgesic, (BIOFREEZE) 4 % GEL Externally apply topically 4 times daily as needed To left shoulder and right hip       miconazole (MICATIN/MICRO GUARD) 2 % external powder Apply topically 2 times daily as needed for other (Intetriginous candidiasis)       NIFEdipine ER (ADALAT CC) 60 MG 24 hr tablet Take 60 mg by mouth daily       OMEPRAZOLE PO Take 40 mg by mouth every morning       ondansetron (ZOFRAN) 4 MG tablet Take 4 mg by mouth every 6 hours as needed for nausea       order for DME Equipment being ordered: Oxygen at 2 lpm via nasal cannula continuous with portability. Dx: respiratory failure, COPD 1 Device 0     oxyCODONE (OXYCONTIN) 10 MG 12 hr tablet Take 1 tablet (10 mg) by mouth every 12 hours 30 tablet 0     PREDNISONE PO Take 20 mg by mouth daily Then take 10 mg by mouth daily       senna-docusate (SENOKOT-S/PERICOLACE) 8.6-50 MG tablet Take 1 tablet by mouth 2 times daily       VITAMIN D3 1000 units tablet TAKE 1 TABLET BY MOUTH ONCE DAILY 31 tablet 98         REVIEW OF SYSTEMS:  4 point ROS including Respiratory, CV, GI and , other than that noted in the HPI,  is negative    Objective:  BP (!) 182/72   Pulse 66   Temp 97.9  F (36.6  C)   Resp 16   Ht 1.6 m (5' 3\")   Wt 85.3 kg (188 lb)   SpO2 95%   BMI 33.30 kg/m    Exam:  GENERAL APPEARANCE:  Alert, in no distress, oriented, cooperative  RESP:  respiratory effort and palpation of chest normal, lungs clear to auscultation , no respiratory distress  CV:  Palpation and auscultation of heart done , regular rate and rhythm, no murmur, rub, or gallop  ABDOMEN:  no guarding or rebound, bowel sounds normal, soft, non-tender  M/S:   unsteady gait, decreased ROM, to LUE, tendernss to muscle of left shoulder, no hip tenderness, mild tenderness to left pelvis with " palpation  SKIN:  Inspection of skin and subcutaneous tissue baseline  NEURO:   Cranial nerves 2-12 are normal tested and grossly at patient's baseline  PSYCH:  normal insight, judgement and memory, affect and mood normal    Labs:   Recent labs in [facility] reviewed by me today.     ASSESSMENT/PLAN:  (M25.512,  G89.29) Chronic left shoulder pain  (primary encounter diagnosis)  (M51.36) DDD (degenerative disc disease), lumbar  (G89.4) Chronic pain syndrome  Comment: Pain appears improved on steroid. Minimal use of PRN narcotics. Does cut from 40mg to 20mg on 11/24. Gabapentin increased from 200mg BID to 300mg BID. ? Shoulder pain muscular vs cervicalgia. Likely unable to tolerate diagnostic MRI. Able to participate with PT and OT, Follows with pain clinic.   Plan: Continue gabapentin 300mg BID, prednisone 20mg x 5 days then reduce to 10mg daily, tizanidine from 2mg q8h, continue OxyContin 10mg q 12 h, continue dilaudid 2-4mg q4h PRN, lidocaine patch daily to left shoulder, duloxetine 60mg q AM and 30mg at bedtime, tylenol 1000mg q8h. PT/OT, continue to treat. F/u with pain clinic after TCU discharge.     (I50.32) Chronic diastolic congestive heart failure (H)  (I10) Essential hypertension  Comment: Appears compensated, BP elevated at times, but suspect d/t pain, drops to 130's-150's after pain medication in AM.   Plan: nifedipine ER 60mg daily, continue lisinopril 10mg daily, furosemide 20mg daily, ASA 81mg daily. Continue to monitor  And adjust. Daily weights, low salt diet    (S32.592D) Closed fracture of ramus of left pubis with routine healing, subsequent encounter  (S32.512D) Closed fracture of superior ramus of left pubis with routine healing, subsequent encounter  (R53.81) Physical deconditioning  Comment: S/p fall at [facility]. Making very slow progress with therapy, but able to ambulate 10-20 feet. WC ordered. Pain controlled.   Plan: Pain regimen as above, PT and OT treat,  to assist with discharge  planning.     transcribed by : Aliyah Milan  Orders:  1. Prednisone 20 mg PO q day x 5 days, then decrease to 10 mg PO q day - dx: left arm pain. Low back pain    Electronically signed by:  MARY Gómez CNP

## 2019-11-25 NOTE — LETTER
"    11/25/2019        RE: Jazmin Clifton  [address]  [facility] [place] [address]        [place] [facility]  [place] Medical Record Number:  [id number]  Place of Service where encounter took place:  [facility] ([facility]) [[id number]]  Chief Complaint   Patient presents with     RECHECK       HPI:    Jazmin Clifton  is a 86 year old (12/30/1932), who is being seen today for an episodic care visit.  HPI information obtained from: facility chart records, facility staff, patient report and [place] Epic chart review. Today's concern is:     Chronic left shoulder pain  DDD (degenerative disc disease), lumbar  Chronic pain syndrome  Chronic diastolic congestive heart failure (H)  Essential hypertension  Closed fracture of ramus of left pubis with routine healing, subsequent encounter  Closed fracture of superior ramus of left pubis with routine healing, subsequent encounter  Physical deconditioning   Recently hospitalized after fall with pubic fracture. Has had issues with chronic left shoulder pain since admission to TCU. Was started on steroid last week, she is not sure she has had much pain improvement but staff have noted decreased use of PRN dilaudid and note that she was able use the arm some over the weekend. Did have severe pain last Friday after rotating her heat to the right, states pain now only intermittent, none as severe as Friday as occurred. Has been able to participate in therapy.     Had fall early this AM - was attempted to self transfer off the toilet when legs lost \"gave out\" and she slid to the floor. Staff was able to assist her off the floor and back to bed. She reports she had some mild right hip pain initially, but states that is now resolved and has no new pain and states she is moving at her baseline mobility.     Reports she is having regular bowel movements, is eating well. No chest pain or SOB. Sleeping well at night. Has no other concerns today.     Past " Medical and Surgical History reviewed in Epic today.    MEDICATIONS:  Current Outpatient Medications   Medication Sig Dispense Refill     acetaminophen (TYLENOL) 500 MG tablet Take 1,000 mg by mouth 3 times daily       aspirin (ASA) 81 MG chewable tablet Take 1 tablet (81 mg) by mouth daily       atorvastatin (LIPITOR) 40 MG tablet TAKE 1 TABLET BY MOUTH ONCE DAILY 30 tablet 98     bisacodyl (DULCOLAX) 10 MG suppository Place 10 mg rectally daily as needed for constipation       Blood Glucose Monitoring Suppl [facility] 2 times daily Call nurse for further directions if blood glucose is less than 80 or greater than 250       BREO ELLIPTA 100-25 MCG/INH inhaler INHALE 1 PUFF BY MOUTH ONCE DAILY 1 Inhaler 11     calcium carbonate (TUMS) 500 MG chewable tablet Take 1 tablet (500 mg) by mouth 3 times daily       DONEPEZIL HCL PO Take 5 mg by mouth At Bedtime        DULoxetine (CYMBALTA) 30 MG capsule Take 1 capsule (30 mg) by mouth At Bedtime       DULoxetine (CYMBALTA) 60 MG capsule Take 1 capsule (60 mg) by mouth every morning       ferrous sulfate (FEROSUL) 325 (65 Fe) MG tablet Take 1 tablet (325 mg) by mouth daily       fluticasone (FLONASE) 50 MCG/ACT nasal spray Spray 1 spray into both nostrils daily       furosemide (LASIX) 20 MG tablet Take 1 tablet (20 mg) by mouth daily 30 tablet 1     gabapentin (NEURONTIN) 300 MG capsule Take 300 mg by mouth 2 times daily Hold for sedation or confusoin       HYDROmorphone (DILAUDID) 2 MG tablet Take 1-2 tablets (2-4 mg) by mouth every 6 hours as needed for pain 20 tablet 0     hypromellose-dextran (ARTIFICAL TEARS) 0.1-0.3 % ophthalmic solution Place 1 drop into both eyes every 6 hours as needed for dry eyes       levalbuterol (XOPENEX) 1.25 MG/3ML neb solution Take 1 ampule by nebulization every 4 hours as needed for shortness of breath / dyspnea or wheezing And every 4 hours PRN       Lidocaine (LIDOCARE) 4 % Patch Place 1 patch onto the skin daily To desired area (shoulder or  "hip). On for 12hrs, off for 12hrs       lisinopril (PRINIVIL/ZESTRIL) 10 MG tablet Take 1 tablet (10 mg) by mouth daily 30 tablet 1     magnesium hydroxide (MILK OF MAGNESIA) 400 MG/5ML suspension Take 30 mLs by mouth daily as needed for constipation or heartburn       Menthol, Topical Analgesic, (BIOFREEZE) 4 % GEL Externally apply topically 4 times daily as needed To left shoulder and right hip       miconazole (MICATIN/MICRO GUARD) 2 % external powder Apply topically 2 times daily as needed for other (Intetriginous candidiasis)       NIFEdipine ER (ADALAT CC) 60 MG 24 hr tablet Take 60 mg by mouth daily       OMEPRAZOLE PO Take 40 mg by mouth every morning       ondansetron (ZOFRAN) 4 MG tablet Take 4 mg by mouth every 6 hours as needed for nausea       order for DME Equipment being ordered: Oxygen at 2 lpm via nasal cannula continuous with portability. Dx: respiratory failure, COPD 1 Device 0     oxyCODONE (OXYCONTIN) 10 MG 12 hr tablet Take 1 tablet (10 mg) by mouth every 12 hours 30 tablet 0     PREDNISONE PO Take 20 mg by mouth daily Then take 10 mg by mouth daily       senna-docusate (SENOKOT-S/PERICOLACE) 8.6-50 MG tablet Take 1 tablet by mouth 2 times daily       VITAMIN D3 1000 units tablet TAKE 1 TABLET BY MOUTH ONCE DAILY 31 tablet 98         REVIEW OF SYSTEMS:  4 point ROS including Respiratory, CV, GI and , other than that noted in the HPI,  is negative    Objective:  BP (!) 182/72   Pulse 66   Temp 97.9  F (36.6  C)   Resp 16   Ht 1.6 m (5' 3\")   Wt 85.3 kg (188 lb)   SpO2 95%   BMI 33.30 kg/m     Exam:  GENERAL APPEARANCE:  Alert, in no distress, oriented, cooperative  RESP:  respiratory effort and palpation of chest normal, lungs clear to auscultation , no respiratory distress  CV:  Palpation and auscultation of heart done , regular rate and rhythm, no murmur, rub, or gallop  ABDOMEN:  no guarding or rebound, bowel sounds normal, soft, non-tender  M/S:   unsteady gait, decreased ROM, to " LUE, tendernss to muscle of left shoulder, no hip tenderness, mild tenderness to left pelvis with palpation  SKIN:  Inspection of skin and subcutaneous tissue baseline  NEURO:   Cranial nerves 2-12 are normal tested and grossly at patient's baseline  PSYCH:  normal insight, judgement and memory, affect and mood normal    Labs:   Recent labs in [facility] reviewed by me today.     ASSESSMENT/PLAN:  (M25.512,  G89.29) Chronic left shoulder pain  (primary encounter diagnosis)  (M51.36) DDD (degenerative disc disease), lumbar  (G89.4) Chronic pain syndrome  Comment: Pain appears improved on steroid. Minimal use of PRN narcotics. Does cut from 40mg to 20mg on 11/24. Gabapentin increased from 200mg BID to 300mg BID. ? Shoulder pain muscular vs cervicalgia. Likely unable to tolerate diagnostic MRI. Able to participate with PT and OT, Follows with pain clinic.   Plan: Continue gabapentin 300mg BID, prednisone 20mg x 5 days then reduce to 10mg daily, tizanidine from 2mg q8h, continue OxyContin 10mg q 12 h, continue dilaudid 2-4mg q4h PRN, lidocaine patch daily to left shoulder, duloxetine 60mg q AM and 30mg at bedtime, tylenol 1000mg q8h. PT/OT, continue to treat. F/u with pain clinic after TCU discharge.     (I50.32) Chronic diastolic congestive heart failure (H)  (I10) Essential hypertension  Comment: Appears compensated, BP elevated at times, but suspect d/t pain, drops to 130's-150's after pain medication in AM.   Plan: nifedipine ER 60mg daily, continue lisinopril 10mg daily, furosemide 20mg daily, ASA 81mg daily. Continue to monitor  And adjust. Daily weights, low salt diet    (S32.592D) Closed fracture of ramus of left pubis with routine healing, subsequent encounter  (S32.512D) Closed fracture of superior ramus of left pubis with routine healing, subsequent encounter  (R53.81) Physical deconditioning  Comment: S/p fall at [facility]. Making very slow progress with therapy, but able to ambulate 10-20 feet. WC ordered. Pain  controlled.   Plan: Pain regimen as above, PT and OT treat,  to assist with discharge planning.     transcribed by : Aliyah Milan  Orders:  1. Prednisone 20 mg PO q day x 5 days, then decrease to 10 mg PO q day - dx: left arm pain. Low back pain    Electronically signed by:  MARY Gómez CNP             Sincerely,        MARY Gómez CNP

## 2019-11-27 NOTE — PROGRESS NOTES
[facility] DISCHARGE SUMMARY  PATIENT'S NAME: Jazmin Clifton  YOB: 1932  MEDICAL RECORD NUMBER:  [id number]  Place of Service where encounter took place:  [facility] [[id number]]    PRIMARY CARE PROVIDER AND CLINIC RESPONSIBLE AFTER TRANSFER:   MARY Gómez CNP, [address] [address] / [facility] [address]    [facility] Provider     Transferring providers: MARY Gómez CNP, Dr. Ana Cristina Yu MD  Recent Hospitalization/ED:  [facility] stay 11/2/2019 to 11/6/2019.  Date of SNF Admission: November / 08 / 2019  Date of SNF (anticipated) Discharge: November / 30 / 2019  Discharged to:  Home at [facility]  Cognitive Scores: BIMS: 14/15  Physical Function: Wheelchair dependent  DME: Wheelchair and DME F2F done - son to  wheelchair today    CODE STATUS/ADVANCE DIRECTIVES DISCUSSION:  DNR / DNI   ALLERGIES: Accupril; Ace inhibitors; Augmentin; Blood-group specific substance; Levofloxacin; Morphine; Nitrofurantoin; Norvasc [amlodipine besylate]; and Quinapril    DISCHARGE DIAGNOSIS/NURSING FACILITY COURSE:   Brief Summary of Hospital Course:   Jazmin Clifton is a 86 year old  (12/30/1932) female who [facility] for an initial visit. Medical history is notable for CAD, HTN, COPD, CVA, R groin seroma and lumbar spinal s visit not done yet but he tenosis s/p L1-L2 bilateral laminectomy and medial facetectomy (1/9/19).   She was hospitalized at [facility] from 11/2/2019 to 11/6/2019 where she presented after a fall and was found to have a left inferior pubic rami fracture.  She was started on naproxen and developed acute renal failure for which the naproxen, lisinopril and furosemide were discontinued.  She was treated with IV fluids with improvement in renal function and has been resumed on the furosemide and lisinopril.  She was discharged with a  [place] catheter due to acute urinary retention. She was admitted to this facility for medical management and rehab.       On AM of 11/15, patient reports severe, sharp pain to left shoulder and requested transfer to ED for evaluation. This pain has been an ongoing issues for the past several months. Initial injury earlier this year when resident fell into her while on the elevator. She re-injured arm when attempt to raise self off bed several weeks ago. She was noted to have hematoma of left bicep at that time. In ED shoulder x-ray negative, showed osteopenia.  Chest CT negative for PE. No s/s of ACS. Her PRN oxycodone was discontinued and she was discharged back to TCU on oral dilaudid.       losed fracture of superior ramus of left pubis with routine healing, subsequent encounter  Impaired gait and mobility  Physical deconditioning  Secondary to fall at [facility]. Limited ambulation prior to fall, now only able to walk ~5ft. Per therapy recommendation will order WC for mobility. Pain controlled with  PTA Oxycontin 10mg BID, oxycodone 5mg PRN was changed to dilaudid 2-4mg q6h PRN d/t left arm pain as below, tizanidine as below. She will continue with home PT and OT at discharge.     Chronic left shoulder pain  DDD (degenerative disc disease), lumbar ( S/p L1 L2 Bilateral Laminectomy and Medial Facetectomy (1/9/19)  Chronic pain syndrome  Follows with pain clinic. Had severe left shoulder pain, was seen in ED, oxycodone PRN was changed to dilaudid 2-4mg q6h PRN. Continued to have pain in TCU Suspect shoulder pain cervicalgia vs muscular. Suspect would be unable to tolerate MRI for DX. Repeatedly told provider she was not interested in any surgical intervention. Was started on prednisone 40mg daily x 5 days, then 20mg daily x 5 days, then 10mg daily. Gabapentin increased from 200mg BID to 300mg BID. Tizanidine changed to scheduled q8h per patient request Patient stated that she did not notice significant improvement in  pain, but therapy noticed improvement in pain control and improved mobility. Staff reported decrease in dilaudid use and increased use of left arm. Will plan to continue prednisone after TCU discharge down to lowest effective dose - may require long-term use of steroids. BG checked BID for first 5 days of steroids use and were WNL. She remains on OxyContin 10mg BID, lidocaine patch to left shoulder. Suspect underlying anxiety contributing to pain.     Chronic diastolic congestive heart failure (H)  Essential hypertension  No s/s of exacerbation during TCU stay, weight stable. Did have some elevated BP in TCU - suspect pain/anxiety contributing. Nifedipine increased from 30mg daily to 60mg daily. Remains on ASA 81 mg daily, atorvastatin 40 mg qhs, furosemide 20 mg BID, lisinopril 10 mg daily. Will continue daily weights in [facility].      CKD (chronic kidney disease) stage 3, GFR 30-59 ml/min (H)  [place] Likely secondary to naproxen use, now resolved and at baseline creatinine/GFR    Chronic obstructive pulmonary disease, unspecified COPD type (H)  Stable, no concerns for exacerbation. Remains on fluticasone-vilanterol 100-25 mcg daily and levalbuterol nebs PRN.     Depression with Anxiety  Some anxiety at times, suspect contributing to pain. Given significant polypharmacy did not feel safe to increased duloxetine at this time. Continue duloxetine 60mg q AM and 30mg at bedtime.        Past Medical History:  has a past medical history of ABDOMINAL PAIN GENERALIZED (3/15/2006), Abdominal pain, generalized (3/15/2006), Atherosclerosis of renal artery (H), BENIGN HYPERTENSION (5/1/2006), Benign neoplasm of scalp and skin of neck, Cerebral aneurysm, nonruptured, Congestive heart failure (H), Depressive disorder, not elsewhere classified, Esophageal reflux, Female stress incontinence, Gastrointestinal malfunction arising from mental factors, Generalized osteoarthrosis, unspecified site, Herpes zoster dermatitis of eyelid, Insomnia,  unspecified, Lumbago, Other chest pain, Other specified cardiac dysrhythmias(427.89), Rectocele, Unspecified disorder of kidney and ureter, and Unspecified essential hypertension.    Discharge Medications:  Current Outpatient Medications   Medication Sig Dispense Refill     acetaminophen (TYLENOL) 500 MG tablet Take 1,000 mg by mouth 3 times daily       aspirin (ASA) 81 MG chewable tablet Take 1 tablet (81 mg) by mouth daily       atorvastatin (LIPITOR) 40 MG tablet TAKE 1 TABLET BY MOUTH ONCE DAILY 30 tablet 98     bisacodyl (DULCOLAX) 10 MG suppository Place 10 mg rectally daily as needed for constipation       Blood Glucose Monitoring Suppl CORY 2 times daily Call nurse for further directions if blood glucose is less than 80 or greater than 250       BREO ELLIPTA 100-25 MCG/INH inhaler INHALE 1 PUFF BY MOUTH ONCE DAILY 1 Inhaler 11     calcium carbonate (TUMS) 500 MG chewable tablet Take 1 tablet (500 mg) by mouth 3 times daily       DONEPEZIL HCL PO Take 5 mg by mouth At Bedtime        DULoxetine (CYMBALTA) 30 MG capsule Take 1 capsule (30 mg) by mouth At Bedtime       DULoxetine (CYMBALTA) 60 MG capsule Take 1 capsule (60 mg) by mouth every morning       ferrous sulfate (FEROSUL) 325 (65 Fe) MG tablet Take 1 tablet (325 mg) by mouth daily       fluticasone (FLONASE) 50 MCG/ACT nasal spray Spray 1 spray into both nostrils daily       furosemide (LASIX) 20 MG tablet Take 1 tablet (20 mg) by mouth daily 30 tablet 1     gabapentin (NEURONTIN) 300 MG capsule Take 300 mg by mouth 2 times daily Hold for sedation or confusoin       HYDROmorphone (DILAUDID) 2 MG tablet TAKE ONE TO TWO TABLETS (2-4MG) BY MOUTH EVERY 6 HOURS AS NEEDED FOR PAIN 30 tablet 0     hypromellose-dextran (ARTIFICAL TEARS) 0.1-0.3 % ophthalmic solution Place 1 drop into both eyes every 6 hours as needed for dry eyes       levalbuterol (XOPENEX) 1.25 MG/3ML neb solution Take 1 ampule by nebulization every 4 hours as needed for shortness of breath /  dyspnea or wheezing And every 4 hours PRN       Lidocaine (LIDOCARE) 4 % Patch Place 1 patch onto the skin daily To desired area (shoulder or hip). On for 12hrs, off for 12hrs       lisinopril (PRINIVIL/ZESTRIL) 10 MG tablet Take 1 tablet (10 mg) by mouth daily 30 tablet 1     magnesium hydroxide (MILK OF MAGNESIA) 400 MG/5ML suspension Take 30 mLs by mouth daily as needed for constipation or heartburn       Menthol, Topical Analgesic, (BIOFREEZE) 4 % GEL Externally apply topically 4 times daily as needed To left shoulder and right hip       miconazole (MICATIN/MICRO GUARD) 2 % external powder Apply topically 2 times daily as needed for other (Intetriginous candidiasis)       NIFEdipine ER (ADALAT CC) 60 MG 24 hr tablet Take 60 mg by mouth daily       OMEPRAZOLE PO Take 40 mg by mouth every morning       ondansetron (ZOFRAN) 4 MG tablet Take 4 mg by mouth every 6 hours as needed for nausea       order for DME Equipment being ordered: Oxygen at 2 lpm via nasal cannula continuous with portability. Dx: respiratory failure, COPD 1 Device 0     oxyCODONE (OXYCONTIN) 10 MG 12 hr tablet Take 1 tablet (10 mg) by mouth every 12 hours 30 tablet 0     PREDNISONE PO Take 20 mg by mouth daily Then take 10 mg by mouth daily       senna-docusate (SENOKOT-S/PERICOLACE) 8.6-50 MG tablet Take 1 tablet by mouth 2 times daily       tiZANidine (ZANAFLEX) 2 MG tablet Take 2 mg by mouth 3 times daily       VITAMIN D3 1000 units tablet TAKE 1 TABLET BY MOUTH ONCE DAILY 31 tablet 98       Medication Changes/Rationale:     Started on steroids for chronic shoulder pain    Oxycodone changed to dilaudid d/t uncontrolled pain    Nifedipine increased from 30mg to 60mg d/t HTN    Controlled medications sent with patient:   Medication: Oxycontin  , #14 tabs given to patient at the time of discharge to take home  Medication: Dilaudid , #30 tabs given to patient at the time of discharge to take home     ROS:   10 point ROS of systems including  "Constitutional, Eyes, Respiratory, Cardiovascular, Gastroenterology, Genitourinary, Integumentary, Musculoskeletal, Psychiatric were all negative except for pertinent positives noted in my HPI.    Physical Exam:   Vitals: BP (!) 162/73   Pulse 72   Temp 98.2  F (36.8  C)   Resp 18   Ht 1.6 m (5' 3\")   Wt 84.8 kg (187 lb)   SpO2 94%   BMI 33.13 kg/m    BMI= Body mass index is 33.13 kg/m .  GENERAL APPEARANCE:  Alert, in no distress, oriented, cooperative  RESP:  respiratory effort and palpation of chest normal, lungs clear to auscultation , no respiratory distress  CV:  Palpation and auscultation of heart done , regular rate and rhythm, no murmur, rub, or gallop, +2 pedal pulses, peripheral edema 1-2+ in bilat ankles  ABDOMEN:  no guarding or rebound, bowel sounds normal  M/S:   Gait and station abnormal small steps, unsteady gait, primarily WC bound, decreased ROM to neck/left shoulder, left shoulder scapular area tender to touch, left hip mildly tender with palpation.   SKIN:  Inspection of skin and subcutaneous tissue baseline  NEURO:   Cranial nerves 2-12 are normal tested and grossly at patient's baseline  PSYCH:  oriented X 3, normal insight, judgement and memory, affect and mood normal     SNF labs:   Most Recent 3 CBC's:  Recent Labs   Lab Test 11/15/19  1054 11/11/19  0715 11/06/19  0449   WBC 6.1 6.5 5.5   HGB 10.4* 9.1* 8.3*   MCV 90 91 91    210 155     Most Recent 3 BMP's:  Recent Labs   Lab Test 11/15/19  1054 11/11/19  0715 11/06/19  0449    138 141   POTASSIUM 4.4 4.1 4.9   CHLORIDE 109 107 108   CO2 27 29 29   BUN 24 28 32*   CR 1.02 1.05* 0.96   ANIONGAP 3 2* 4   BLAIR 10.2* 9.6 9.3   * 93 105*     Most Recent 3 INR's:  Recent Labs   Lab Test 11/15/19  1054 05/10/19  1104 02/19/19  0546   INR 1.09 1.02 1.09     Most Recent 3 Creatinines:  Recent Labs   Lab Test 11/15/19  1054 11/11/19  0715 11/06/19  0449   CR 1.02 1.05* 0.96     INR Flow sheet at SNF:    DISCHARGE " PLAN:    Follow up labs: No labs orders/due    Medical Follow Up:      Follow up with primary care provider in 1-2 weeks    MTM referral needed and placed by this provider: No - Already seen my MTM pharmacist    Current [place] scheduled appointments:       Discharge Services: Home Care:  Occupational Therapy, Physical Therapy, Home Health Aide and [facility] [phone] or [phone]    Discharge Instructions Verbalized to Patient at Discharge:     Weigh yourself daily in the morning and keep a record. Call your primary clinic: a) if you are more short of breath, or b) if your weight changes more than 3 pounds in one day or more than 5 pounds in one week.       TOTAL DISCHARGE TIME:   Greater than 30 minutes  Electronically signed by:  MARY Gómez CNP       Home care Face to Face documentation done in [facility] attached to Home care orders for [facility].

## 2019-11-29 NOTE — LETTER
11/29/2019        RE: Jazmin Clifton  [address]  [facility] [address]        [facility] DISCHARGE SUMMARY  PATIENT'S NAME: Jazmin Clifton  YOB: 1932  MEDICAL RECORD NUMBER:  [id number]  Place of Service where encounter took place:  [facility] (SNF) [[id number]]    PRIMARY CARE PROVIDER AND CLINIC RESPONSIBLE AFTER TRANSFER:   MARY Gómez CNP, [address] [address]    [facility] Provider     Transferring providers: MARY Gómez CNP, Dr. Ana Cristina Yu MD  Recent Hospitalization/ED:  [facility] stay 11/2/2019 to 11/6/2019.  Date of SNF Admission: November / 08 / 2019  Date of SNF (anticipated) Discharge: November / 30 / 2019  Discharged to:  Home at [facility]  Cognitive Scores: BIMS: 14/15  Physical Function: Wheelchair dependent  DME: Wheelchair and DME F2F done - son to  wheelchair today    CODE STATUS/ADVANCE DIRECTIVES DISCUSSION:  DNR / DNI   ALLERGIES: Accupril; Ace inhibitors; Augmentin; Blood-group specific substance; Levofloxacin; Morphine; Nitrofurantoin; Norvasc [amlodipine besylate]; and Quinapril    DISCHARGE DIAGNOSIS/NURSING FACILITY COURSE:   Brief Summary of Hospital Course:   Jazmin Clifton is a 86 year old  (12/30/1932) female who was seen at [facility] on November 13, 2019 for an initial visit. Medical history is notable for CAD, HTN, COPD, CVA, R groin seroma and lumbar spinal s visit not done yet but he tenosis s/p L1-L2 bilateral laminectomy and medial facetectomy (1/9/19).   She was hospitalized at [facility] from 11/2/2019 to 11/6/2019 where she presented after a fall and was found to have a left inferior pubic rami fracture.  She was started on naproxen and developed acute renal failure for which the naproxen, lisinopril and furosemide were discontinued.  She was treated with IV fluids with improvement in  renal function and has been resumed on the furosemide and lisinopril.  She was discharged with a Maldonado catheter due to acute urinary retention. She was admitted to this facility for medical management and rehab.       On AM of 11/15, patient reports severe, sharp pain to left shoulder and requested transfer to ED for evaluation. This pain has been an ongoing issues for the past several months. Initial injury earlier this year when resident fell into her while on the elevator. She re-injured arm when attempt to raise self off bed several weeks ago. She was noted to have hematoma of left bicep at that time. In ED shoulder x-ray negative, showed osteopenia.  Chest CT negative for PE. No s/s of ACS. Her PRN oxycodone was discontinued and she was discharged back to TCU on oral dilaudid.       losed fracture of superior ramus of left pubis with routine healing, subsequent encounter  Impaired gait and mobility  Physical deconditioning  Secondary to fall at [facility]. Limited ambulation prior to fall, now only able to walk ~5ft. Per therapy recommendation will order WC for mobility. Pain controlled with  PTA Oxycontin 10mg BID, oxycodone 5mg PRN was changed to dilaudid 2-4mg q6h PRN d/t left arm pain as below, tizanidine as below. She will continue with home PT and OT at discharge.     Chronic left shoulder pain  DDD (degenerative disc disease), lumbar ( S/p L1 L2 Bilateral Laminectomy and Medial Facetectomy (1/9/19)  Chronic pain syndrome  Follows with pain clinic. Had severe left shoulder pain, was seen in ED, oxycodone PRN was changed to dilaudid 2-4mg q6h PRN. Continued to have pain in TCU Suspect shoulder pain cervicalgia vs muscular. Suspect would be unable to tolerate MRI for DX. Repeatedly told provider she was not interested in any surgical intervention. Was started on prednisone 40mg daily x 5 days, then 20mg daily x 5 days, then 10mg daily. Gabapentin increased from 200mg BID to 300mg BID. Tizanidine changed to  scheduled q8h per patient request Patient stated that she did not notice significant improvement in pain, but therapy noticed improvement in pain control and improved mobility. Staff reported decrease in dilaudid use and increased use of left arm. Will plan to continue prednisone after TCU discharge down to lowest effective dose - may require long-term use of steroids. BG checked BID for first 5 days of steroids use and were WNL. She remains on OxyContin 10mg BID, lidocaine patch to left shoulder. Suspect underlying anxiety contributing to pain.     Chronic diastolic congestive heart failure (H)  Essential hypertension  No s/s of exacerbation during TCU stay, weight stable. Did have some elevated BP in TCU - suspect pain/anxiety contributing. Nifedipine increased from 30mg daily to 60mg daily. Remains on ASA 81 mg daily, atorvastatin 40 mg qhs, furosemide 20 mg BID, lisinopril 10 mg daily. Will continue daily weights in half-way.      CKD (chronic kidney disease) stage 3, GFR 30-59 ml/min (H)  [place] Likely secondary to naproxen use, now resolved and at baseline creatinine/GFR    Chronic obstructive pulmonary disease, unspecified COPD type (H)  Stable, no concerns for exacerbation. Remains on fluticasone-vilanterol 100-25 mcg daily and levalbuterol nebs PRN.     Depression with Anxiety  Some anxiety at times, suspect contributing to pain. Given significant polypharmacy did not feel safe to increased duloxetine at this time. Continue duloxetine 60mg q AM and 30mg at bedtime.        Past Medical History:  has a past medical history of ABDOMINAL PAIN GENERALIZED (3/15/2006), Abdominal pain, generalized (3/15/2006), Atherosclerosis of renal artery (H), BENIGN HYPERTENSION (5/1/2006), Benign neoplasm of scalp and skin of neck, Cerebral aneurysm, nonruptured, Congestive heart failure (H), Depressive disorder, not elsewhere classified, Esophageal reflux, Female stress incontinence, Gastrointestinal malfunction arising from mental  factors, Generalized osteoarthrosis, unspecified site, Herpes zoster dermatitis of eyelid, Insomnia, unspecified, Lumbago, Other chest pain, Other specified cardiac dysrhythmias(427.89), Rectocele, Unspecified disorder of kidney and ureter, and Unspecified essential hypertension.    Discharge Medications:  Current Outpatient Medications   Medication Sig Dispense Refill     acetaminophen (TYLENOL) 500 MG tablet Take 1,000 mg by mouth 3 times daily       aspirin (ASA) 81 MG chewable tablet Take 1 tablet (81 mg) by mouth daily       atorvastatin (LIPITOR) 40 MG tablet TAKE 1 TABLET BY MOUTH ONCE DAILY 30 tablet 98     bisacodyl (DULCOLAX) 10 MG suppository Place 10 mg rectally daily as needed for constipation       Blood Glucose Monitoring Suppl CORY 2 times daily Call nurse for further directions if blood glucose is less than 80 or greater than 250       BREO ELLIPTA 100-25 MCG/INH inhaler INHALE 1 PUFF BY MOUTH ONCE DAILY 1 Inhaler 11     calcium carbonate (TUMS) 500 MG chewable tablet Take 1 tablet (500 mg) by mouth 3 times daily       DONEPEZIL HCL PO Take 5 mg by mouth At Bedtime        DULoxetine (CYMBALTA) 30 MG capsule Take 1 capsule (30 mg) by mouth At Bedtime       DULoxetine (CYMBALTA) 60 MG capsule Take 1 capsule (60 mg) by mouth every morning       ferrous sulfate (FEROSUL) 325 (65 Fe) MG tablet Take 1 tablet (325 mg) by mouth daily       fluticasone (FLONASE) 50 MCG/ACT nasal spray Spray 1 spray into both nostrils daily       furosemide (LASIX) 20 MG tablet Take 1 tablet (20 mg) by mouth daily 30 tablet 1     gabapentin (NEURONTIN) 300 MG capsule Take 300 mg by mouth 2 times daily Hold for sedation or confusoin       HYDROmorphone (DILAUDID) 2 MG tablet TAKE ONE TO TWO TABLETS (2-4MG) BY MOUTH EVERY 6 HOURS AS NEEDED FOR PAIN 30 tablet 0     hypromellose-dextran (ARTIFICAL TEARS) 0.1-0.3 % ophthalmic solution Place 1 drop into both eyes every 6 hours as needed for dry eyes       levalbuterol (XOPENEX)  1.25 MG/3ML neb solution Take 1 ampule by nebulization every 4 hours as needed for shortness of breath / dyspnea or wheezing And every 4 hours PRN       Lidocaine (LIDOCARE) 4 % Patch Place 1 patch onto the skin daily To desired area (shoulder or hip). On for 12hrs, off for 12hrs       lisinopril (PRINIVIL/ZESTRIL) 10 MG tablet Take 1 tablet (10 mg) by mouth daily 30 tablet 1     magnesium hydroxide (MILK OF MAGNESIA) 400 MG/5ML suspension Take 30 mLs by mouth daily as needed for constipation or heartburn       Menthol, Topical Analgesic, (BIOFREEZE) 4 % GEL Externally apply topically 4 times daily as needed To left shoulder and right hip       miconazole (MICATIN/MICRO GUARD) 2 % external powder Apply topically 2 times daily as needed for other (Intetriginous candidiasis)       NIFEdipine ER (ADALAT CC) 60 MG 24 hr tablet Take 60 mg by mouth daily       OMEPRAZOLE PO Take 40 mg by mouth every morning       ondansetron (ZOFRAN) 4 MG tablet Take 4 mg by mouth every 6 hours as needed for nausea       order for DME Equipment being ordered: Oxygen at 2 lpm via nasal cannula continuous with portability. Dx: respiratory failure, COPD 1 Device 0     oxyCODONE (OXYCONTIN) 10 MG 12 hr tablet Take 1 tablet (10 mg) by mouth every 12 hours 30 tablet 0     PREDNISONE PO Take 20 mg by mouth daily Then take 10 mg by mouth daily       senna-docusate (SENOKOT-S/PERICOLACE) 8.6-50 MG tablet Take 1 tablet by mouth 2 times daily       tiZANidine (ZANAFLEX) 2 MG tablet Take 2 mg by mouth 3 times daily       VITAMIN D3 1000 units tablet TAKE 1 TABLET BY MOUTH ONCE DAILY 31 tablet 98       Medication Changes/Rationale:     Started on steroids for chronic shoulder pain    Oxycodone changed to dilaudid d/t uncontrolled pain    Nifedipine increased from 30mg to 60mg d/t HTN    Controlled medications sent with patient:   Medication: Oxycontin  , #14 tabs given to patient at the time of discharge to take home  Medication: Dilaudid , #30 tabs  "given to patient at the time of discharge to take home     ROS:   10 point ROS of systems including Constitutional, Eyes, Respiratory, Cardiovascular, Gastroenterology, Genitourinary, Integumentary, Musculoskeletal, Psychiatric were all negative except for pertinent positives noted in my HPI.    Physical Exam:   Vitals: BP (!) 162/73   Pulse 72   Temp 98.2  F (36.8  C)   Resp 18   Ht 1.6 m (5' 3\")   Wt 84.8 kg (187 lb)   SpO2 94%   BMI 33.13 kg/m     BMI= Body mass index is 33.13 kg/m .  GENERAL APPEARANCE:  Alert, in no distress, oriented, cooperative  RESP:  respiratory effort and palpation of chest normal, lungs clear to auscultation , no respiratory distress  CV:  Palpation and auscultation of heart done , regular rate and rhythm, no murmur, rub, or gallop, +2 pedal pulses, peripheral edema 1-2+ in bilat ankles  ABDOMEN:  no guarding or rebound, bowel sounds normal  M/S:   Gait and station abnormal small steps, unsteady gait, primarily WC bound, decreased ROM to neck/left shoulder, left shoulder scapular area tender to touch, left hip mildly tender with palpation.   SKIN:  Inspection of skin and subcutaneous tissue baseline  NEURO:   Cranial nerves 2-12 are normal tested and grossly at patient's baseline  PSYCH:  oriented X 3, normal insight, judgement and memory, affect and mood normal     SNF labs:   Most Recent 3 CBC's:  Recent Labs   Lab Test 11/15/19  1054 11/11/19  0715 11/06/19  0449   WBC 6.1 6.5 5.5   HGB 10.4* 9.1* 8.3*   MCV 90 91 91    210 155     Most Recent 3 BMP's:  Recent Labs   Lab Test 11/15/19  1054 11/11/19  0715 11/06/19  0449    138 141   POTASSIUM 4.4 4.1 4.9   CHLORIDE 109 107 108   CO2 27 29 29   BUN 24 28 32*   CR 1.02 1.05* 0.96   ANIONGAP 3 2* 4   BLAIR 10.2* 9.6 9.3   * 93 105*     Most Recent 3 INR's:  Recent Labs   Lab Test 11/15/19  1054 05/10/19  1104 02/19/19  0546   INR 1.09 1.02 1.09     Most Recent 3 Creatinines:  Recent Labs   Lab Test 11/15/19  1054 " 11/11/19  0715 11/06/19  0449   CR 1.02 1.05* 0.96     INR Flow sheet at SNF:    DISCHARGE PLAN:    Follow up labs: No labs orders/due    Medical Follow Up:      Follow up with primary care provider in 1-2 weeks    MTM referral needed and placed by this provider: No - Already seen my MTM pharmacist    Current [place] scheduled appointments:       Discharge Services: Home Care:  Occupational Therapy, Physical Therapy, Home Health Aide and [facility] [phone] or [phone]    Discharge Instructions Verbalized to Patient at Discharge:     Weigh yourself daily in the morning and keep a record. Call your primary clinic: a) if you are more short of breath, or b) if your weight changes more than 3 pounds in one day or more than 5 pounds in one week.       TOTAL DISCHARGE TIME:   Greater than 30 minutes  Electronically signed by:  MARY Gómez CNP       Home care Face to Face documentation done in EPIC attached to Home care orders for [facility].                   Sincerely,        MARY Gómez CNP

## 2019-12-01 NOTE — TELEPHONE ENCOUNTER
UC results returned - Sensitivities pending. Patient minimally sumptomatic at this time. Will await sensitivity results at this time. Update if change in patient status.    Dr. Alexa Rueda, APRN, DNP  Phone: [phone]  Fax: [phone]  [address], [address]  [place] [address]

## 2019-12-02 NOTE — PROGRESS NOTES
Estimated Creatinine Clearance: 40.9 mL/min (based on SCr of 1.02 mg/dL).      Will order Bactrim DS q12 x 3 days for UTI; UC grew >100 K e.coli.  BMP on 12/5

## 2019-12-05 NOTE — PROGRESS NOTES
12-3-19  CC sent email to RN team at [facility] asking for update on member if there were any changes or concerns in regards to member since returning back to the [facility].  CC asked for contact if any changed needed to be made to the RS tool and CC will f/u as needed if contacted.   Mirian Weldon MA [facility] Care Coordinator   [phone]

## 2019-12-09 NOTE — PROGRESS NOTES
[facility]  [facility] Record Number:  [id number]  Place of Service where encounter took place:  [facility] [[id number]]  Chief Complaint   Patient presents with     RECHECK       HPI:    Jazmin Clifton  is a 86 year old (12/30/1932), who is being seen today for an episodic care visit.  HPI information obtained from: facility chart records, facility staff, patient report and [facility] Epic chart review. Today's concern is:     Chronic left shoulder pain  DDD (degenerative disc disease), lumbar  Chronic pain syndrome  Closed fracture of ramus of left pubis with routine healing, subsequent encounter  Oral cavity pain  Age-related osteoporosis without current pathological fracture  Essential hypertension  Chronic diastolic congestive heart failure (H)  CKD (chronic kidney disease) stage 3, GFR 30-59 ml/min (H)  Atypical chest pain   Patient recently hospitalized after fall with pelvic fracture. She spent ~ 3 weeks in TCU for rehab and pain control, and was discharged back to [facility] on 12/10. Seen today in [facility]. She has had 1 fall since returning to [facility]. States she was trying to transfer from her bed to her wheelchair and ended up sliding to the floor. Denies denies any new pain in her hips, back or pelvis, feels she is moving at her baseline since the pelvic fracture. She does reports increased aching in her shoulders and neck since the fall, but thinks this is getting better. She transfers independently in her apartment. She is continuing with home PT and OT.     She reports that she had an episode of right sided.middle sternal pain this AM after reaching for her purse on the floor. Denies any diaphoresis, SOB, or palpitations. States pain was mild, and she was able to go down to the first floor, drop off a check at the  and return to her apartment and pain resolved. She did not tell anyone about the pain as she feels that it was quite mild, thinks it was  arthritis. States she is not concerned about it.     Complaining of gum discomfort - has multiple missing teeth and states that gums are becoming sore from chewing where the teeth are rubbing on the gums. She was previously scheduled to have teeth pulled at the [facility], but this appoint was cancelled as she was hospitalized for her pelvic fracture. Staff is working on rescheduling her appointment, facility has been giving her grounded textured food.     She was started on steroids while in TCU for chronic left shoulder pain, concerning for cervicalgia. She reports her pain has been a lot better since starting the steroids and lidocaine patches. She has been using her PRN dilaudid about once daily. Discussed reducing dilaudid and steroids to reduce risk of side effects, she was agreeable to both.     Past Medical and Surgical History reviewed in Epic today.    MEDICATIONS:  Current Outpatient Medications   Medication Sig Dispense Refill     acetaminophen (TYLENOL) 500 MG tablet Take 1,000 mg by mouth 3 times daily       aspirin (ASA) 81 MG chewable tablet Take 1 tablet (81 mg) by mouth daily       atorvastatin (LIPITOR) 40 MG tablet TAKE 1 TABLET BY MOUTH ONCE DAILY 30 tablet 98     benzocaine (ORAJEL/ANBESOL) 10 % gel Take by mouth 4 times daily as needed for moderate pain 1 Tube 1     bisacodyl (DULCOLAX) 10 MG suppository Place 10 mg rectally daily as needed for constipation       Blood Glucose Monitoring Suppl CORY 2 times daily Call nurse for further directions if blood glucose is less than 80 or greater than 250       BREO ELLIPTA 100-25 MCG/INH inhaler INHALE 1 PUFF BY MOUTH ONCE DAILY 1 Inhaler 11     calcium carbonate (TUMS) 500 MG chewable tablet Take 1 tablet (500 mg) by mouth 3 times daily       DOK PLUS 50-8.6 MG tablet TAKE 1 TABLET BY MOUTH TWICE DAILY 60 tablet 98     DONEPEZIL HCL PO Take 5 mg by mouth At Bedtime        DULoxetine (CYMBALTA) 30 MG capsule Take 1 capsule (30 mg) by mouth At Bedtime        DULoxetine (CYMBALTA) 60 MG capsule Take 1 capsule (60 mg) by mouth every morning       FEROSUL 325 (65 Fe) MG tablet TAKE 1 TABLET BY MOUTH ONCE DAILY 30 tablet 98     fluticasone (FLONASE) 50 MCG/ACT nasal spray Spray 1 spray into both nostrils daily       furosemide (LASIX) 20 MG tablet TAKE 1 TABLET BY MOUTH ONCE DAILY 30 tablet 98     gabapentin (NEURONTIN) 300 MG capsule TAKE 1 CAPSULE BY MOUTH TWICE DAILY 60 capsule 98     HYDROmorphone (DILAUDID) 2 MG tablet Take 1 tablet (2 mg) by mouth 2 times daily as needed for severe pain 30 tablet 0     hypromellose-dextran (ARTIFICAL TEARS) 0.1-0.3 % ophthalmic solution Place 1 drop into both eyes every 6 hours as needed for dry eyes       levalbuterol (XOPENEX) 1.25 MG/3ML neb solution Take 1 ampule by nebulization every 4 hours as needed for shortness of breath / dyspnea or wheezing And every 4 hours PRN       Lidocaine (LIDOCARE) 4 % Patch Place 1 patch onto the skin daily To desired area (shoulder or hip). On for 12hrs, off for 12hrs 30 patch 11     lisinopril (PRINIVIL/ZESTRIL) 10 MG tablet TAKE 1 TABLET BY MOUTH EVERY MORNING 30 tablet 98     magnesium hydroxide (MILK OF MAGNESIA) 400 MG/5ML suspension Take 30 mLs by mouth daily as needed for constipation or heartburn       Menthol, Topical Analgesic, (BIOFREEZE) 4 % GEL Externally apply topically 4 times daily as needed To left shoulder and right hip       miconazole (MICATIN/MICRO GUARD) 2 % external powder Apply topically 2 times daily as needed for other (Intetriginous candidiasis)       NIFEdipine ER (ADALAT CC) 60 MG 24 hr tablet Take 60 mg by mouth daily       NIFEdipine ER OSMOTIC (PROCARDIA XL) 60 MG 24 hr tablet Take 1 tablet (60 mg) by mouth daily 30 tablet 98     OMEPRAZOLE PO Take 40 mg by mouth every morning       ondansetron (ZOFRAN) 4 MG tablet Take 4 mg by mouth every 6 hours as needed for nausea       order for DME Equipment being ordered: Oxygen at 2 lpm via nasal cannula continuous with  portability. Dx: respiratory failure, COPD 1 Device 0     oxyCODONE (OXYCONTIN) 10 MG 12 hr tablet Take 1 tablet (10 mg) by mouth every 12 hours 30 tablet 0     predniSONE (DELTASONE) 5 MG tablet Take 1 tablet (5 mg) by mouth daily 30 tablet 1     tiZANidine (ZANAFLEX) 2 MG tablet Take 2 mg by mouth 3 times daily       VITAMIN D3 1000 units tablet TAKE 1 TABLET BY MOUTH ONCE DAILY 31 tablet 98         REVIEW OF SYSTEMS:  4 point ROS including Respiratory, CV, GI and , other than that noted in the HPI,  is negative    Objective:  BP (!) 146/79   Pulse 88   Temp 98  F (36.7  C)   Resp 18   Wt 87.9 kg (193 lb 12.8 oz)   SpO2 94%   BMI 34.33 kg/m    Exam:  GENERAL APPEARANCE:  Alert, in no distress, oriented, cooperative  ENT:  Mouth and posterior oropharynx normal, moist mucous membranes, poor dentition, mild area swelling to gums opposite teeth, no erythema, sore, or tenderness  RESP:  respiratory effort and palpation of chest normal, lungs clear to auscultation , no respiratory distress  CV:  Palpation and auscultation of heart done , regular rate and rhythm, no murmur, rub, or gallop, +2 pedal pulses, peripheral edema 1-2+ in bilat ankles  ABDOMEN:  right groin seroma intake, soft, no guarding or rebound, bowel sounds normal, soft, non-tender  M/S:   non-ambulatory, generalized weakness, decreased ROM to left shoulder  SKIN:  Inspection of skin and subcutaneous tissue baseline, area of ecchymosis to left hip  NEURO:   Cranial nerves 2-12 are normal tested and grossly at patient's baseline  PSYCH:  oriented X 3, normal insight, judgement and memory, affect and mood normal    Labs:   Recent labs in [facility] reviewed by me today.  and   Most Recent 3 BMP's:  Recent Labs   Lab Test 12/06/19  0830 11/15/19  1054 11/11/19  0715    139 138   POTASSIUM 4.0 4.4 4.1   CHLORIDE 108 109 107   CO2 27 27 29   BUN 31* 24 28   CR 1.32* 1.02 1.05*   ANIONGAP 4 3 2*   BLAIR 9.2 10.2* 9.6   GLC 87 134* 93        ASSESSMENT/PLAN:  (M25.512,  G89.29) Chronic left shoulder pain  (primary encounter diagnosis)  (M51.36) DDD (degenerative disc disease), lumbar  (G89.4) Chronic pain syndrome  Comment: Pain improved with addition of steroids. Cervicalgia vs arthritis to left shoulder - likely unable to tolerate MRI. Follows with pain clinic. Agreeable to decrease in narcotics and steroids.   Plan: Will decreased dilaudid to 2mg BID PRN, decreased prednisone form 10mg to 5mg daily. Continue oxycontin 10mg BID, tizanidine TID, continue lidocaine patch, APAP,     (S32.502D) Closed fracture of ramus of left pubis with routine healing, subsequent encounter  Comment: Now primarily WC bound, pain controlled, remains a falls risk.   Plan: Decreased dilaudid/pain regimen as above. PT and OT to continue to treat. Encourage patient to request assists with transfers to decrease falls risk.     (K13.79) Oral cavity pain  Comment: d/t poor dentition, no s/s of infection currently  Plan: benzocaine (ORAJEL/ANBESOL) 10 % gel - QID PRN, nursing to assist with re-scheduling teeth extraction    (M81.0) Age-related osteoporosis without current pathological fracture  Comment: Recent pelvic fracture, not a candidate for alendronate d/t CKD  Plan: continue vitamin D, calcium supplement    (I10) Essential hypertension  Comment: Appears controlled, elevated at times d/t pain, anxiety  Plan: NIFEdipine ER OSMOTIC (PROCARDIA XL) 60 MG, furosemide, lisinopril,     (I50.32) Chronic diastolic congestive heart failure (H)  Comment: LE edema significantly improved, appears compensated  Plan: Continue lisinopril, furosemide, ASA, atorvastatin.     (N18.3) CKD (chronic kidney disease) stage 3, GFR 30-59 ml/min (H)  Comment: Stable, see labs, Baseline GFR 35-50.   Plan: avoid nephrotoxic medications, monitor BMP PRN    (R07.89) Atypical chest pain  Comment: Suspect musculoskeletal started when patient was reaching for an object. Now completely resolved   Plan:  Educated patient to update staff if pain re-occurs. Monitor.     transcribed by : Aliyah Milan  Orders:  1. Decrease Prednisone to 5 mg q day - dx: chronic left shoulder pain, DDD  2. Discontinue currently Dilaudid orders - start Dilaudid  2 mg PO BID PRN for severe pain (electronic Rx) sent  3. Orajel 10% or pharmacy equivalent - apply to gums QID PRN for pain - ok to leave at bedside and patient to self administer.  4. Order clarification: Oxycodone 5 mg PO TID and 5 mg PO TID PRN - give PRN doses at least 4 hours apart - dx: severe pain, chronic low back pain        Electronically signed by:  MARY Gómez CNP

## 2019-12-10 NOTE — LETTER
12/10/2019        RE: Jazmin Clifton  [address]  [facility] [address]        [facility]  [facility] Record Number:  [id number]  Place of Service where encounter took place:  [facility] [[id number]]  Chief Complaint   Patient presents with     RECHECK       HPI:    Jazmin Clifton  is a 86 year old (12/30/1932), who is being seen today for an episodic care visit.  HPI information obtained from: facility chart records, facility staff, patient report and [facility] chart review. Today's concern is:     Chronic left shoulder pain  DDD (degenerative disc disease), lumbar  Chronic pain syndrome  Closed fracture of ramus of left pubis with routine healing, subsequent encounter  Oral cavity pain  Age-related osteoporosis without current pathological fracture  Essential hypertension  Chronic diastolic congestive heart failure (H)  CKD (chronic kidney disease) stage 3, GFR 30-59 ml/min (H)  Atypical chest pain   Patient recently hospitalized after fall with pelvic fracture. She spent ~ 3 weeks in TCU for rehab and pain control, and was discharged back to [facility] on 12/10. Seen today in [facility]. She has had 1 fall since returning to [facility]. States she was trying to transfer from her bed to her wheelchair and ended up sliding to the floor. Denies denies any new pain in her hips, back or pelvis, feels she is moving at her baseline since the pelvic fracture. She does reports increased aching in her shoulders and neck since the fall, but thinks this is getting better. She transfers independently in her apartment. She is continuing with home PT and OT.     She reports that she had an episode of right sided.middle sternal pain this AM after reaching for her purse on the floor. Denies any diaphoresis, SOB, or palpitations. States pain was mild, and she was able to go down to the first floor, drop off a check at the  and return to her apartment and pain  resolved. She did not tell anyone about the pain as she feels that it was quite mild, thinks it was arthritis. States she is not concerned about it.     Complaining of gum discomfort - has multiple missing teeth and states that gums are becoming sore from chewing where the teeth are rubbing on the gums. She was previously scheduled to have teeth pulled at the [facility], but this appoint was cancelled as she was hospitalized for her pelvic fracture. Staff is working on rescheduling her appointment, facility has been giving her grounded textured food.     She was started on steroids while in TCU for chronic left shoulder pain, concerning for cervicalgia. She reports her pain has been a lot better since starting the steroids and lidocaine patches. She has been using her PRN dilaudid about once daily. Discussed reducing dilaudid and steroids to reduce risk of side effects, she was agreeable to both.     Past Medical and Surgical History reviewed in Epic today.    MEDICATIONS:  Current Outpatient Medications   Medication Sig Dispense Refill     acetaminophen (TYLENOL) 500 MG tablet Take 1,000 mg by mouth 3 times daily       aspirin (ASA) 81 MG chewable tablet Take 1 tablet (81 mg) by mouth daily       atorvastatin (LIPITOR) 40 MG tablet TAKE 1 TABLET BY MOUTH ONCE DAILY 30 tablet 98     benzocaine (ORAJEL/ANBESOL) 10 % gel Take by mouth 4 times daily as needed for moderate pain 1 Tube 1     bisacodyl (DULCOLAX) 10 MG suppository Place 10 mg rectally daily as needed for constipation       Blood Glucose Monitoring Suppl CORY 2 times daily Call nurse for further directions if blood glucose is less than 80 or greater than 250       BREO ELLIPTA 100-25 MCG/INH inhaler INHALE 1 PUFF BY MOUTH ONCE DAILY 1 Inhaler 11     calcium carbonate (TUMS) 500 MG chewable tablet Take 1 tablet (500 mg) by mouth 3 times daily       DOK PLUS 50-8.6 MG tablet TAKE 1 TABLET BY MOUTH TWICE DAILY 60 tablet 98     DONEPEZIL HCL PO Take 5 mg by mouth  At Bedtime        DULoxetine (CYMBALTA) 30 MG capsule Take 1 capsule (30 mg) by mouth At Bedtime       DULoxetine (CYMBALTA) 60 MG capsule Take 1 capsule (60 mg) by mouth every morning       FEROSUL 325 (65 Fe) MG tablet TAKE 1 TABLET BY MOUTH ONCE DAILY 30 tablet 98     fluticasone (FLONASE) 50 MCG/ACT nasal spray Spray 1 spray into both nostrils daily       furosemide (LASIX) 20 MG tablet TAKE 1 TABLET BY MOUTH ONCE DAILY 30 tablet 98     gabapentin (NEURONTIN) 300 MG capsule TAKE 1 CAPSULE BY MOUTH TWICE DAILY 60 capsule 98     HYDROmorphone (DILAUDID) 2 MG tablet Take 1 tablet (2 mg) by mouth 2 times daily as needed for severe pain 30 tablet 0     hypromellose-dextran (ARTIFICAL TEARS) 0.1-0.3 % ophthalmic solution Place 1 drop into both eyes every 6 hours as needed for dry eyes       levalbuterol (XOPENEX) 1.25 MG/3ML neb solution Take 1 ampule by nebulization every 4 hours as needed for shortness of breath / dyspnea or wheezing And every 4 hours PRN       Lidocaine (LIDOCARE) 4 % Patch Place 1 patch onto the skin daily To desired area (shoulder or hip). On for 12hrs, off for 12hrs 30 patch 11     lisinopril (PRINIVIL/ZESTRIL) 10 MG tablet TAKE 1 TABLET BY MOUTH EVERY MORNING 30 tablet 98     magnesium hydroxide (MILK OF MAGNESIA) 400 MG/5ML suspension Take 30 mLs by mouth daily as needed for constipation or heartburn       Menthol, Topical Analgesic, (BIOFREEZE) 4 % GEL Externally apply topically 4 times daily as needed To left shoulder and right hip       miconazole (MICATIN/MICRO GUARD) 2 % external powder Apply topically 2 times daily as needed for other (Intetriginous candidiasis)       NIFEdipine ER (ADALAT CC) 60 MG 24 hr tablet Take 60 mg by mouth daily       NIFEdipine ER OSMOTIC (PROCARDIA XL) 60 MG 24 hr tablet Take 1 tablet (60 mg) by mouth daily 30 tablet 98     OMEPRAZOLE PO Take 40 mg by mouth every morning       ondansetron (ZOFRAN) 4 MG tablet Take 4 mg by mouth every 6 hours as needed for  nausea       order for DME Equipment being ordered: Oxygen at 2 lpm via nasal cannula continuous with portability. Dx: respiratory failure, COPD 1 Device 0     oxyCODONE (OXYCONTIN) 10 MG 12 hr tablet Take 1 tablet (10 mg) by mouth every 12 hours 30 tablet 0     predniSONE (DELTASONE) 5 MG tablet Take 1 tablet (5 mg) by mouth daily 30 tablet 1     tiZANidine (ZANAFLEX) 2 MG tablet Take 2 mg by mouth 3 times daily       VITAMIN D3 1000 units tablet TAKE 1 TABLET BY MOUTH ONCE DAILY 31 tablet 98         REVIEW OF SYSTEMS:  4 point ROS including Respiratory, CV, GI and , other than that noted in the HPI,  is negative    Objective:  BP (!) 146/79   Pulse 88   Temp 98  F (36.7  C)   Resp 18   Wt 87.9 kg (193 lb 12.8 oz)   SpO2 94%   BMI 34.33 kg/m     Exam:  GENERAL APPEARANCE:  Alert, in no distress, oriented, cooperative  ENT:  Mouth and posterior oropharynx normal, moist mucous membranes, poor dentition, mild area swelling to gums opposite teeth, no erythema, sore, or tenderness  RESP:  respiratory effort and palpation of chest normal, lungs clear to auscultation , no respiratory distress  CV:  Palpation and auscultation of heart done , regular rate and rhythm, no murmur, rub, or gallop, +2 pedal pulses, peripheral edema 1-2+ in bilat ankles  ABDOMEN:  right groin seroma intake, soft, no guarding or rebound, bowel sounds normal, soft, non-tender  M/S:   non-ambulatory, generalized weakness, decreased ROM to left shoulder  SKIN:  Inspection of skin and subcutaneous tissue baseline, area of ecchymosis to left hip  NEURO:   Cranial nerves 2-12 are normal tested and grossly at patient's baseline  PSYCH:  oriented X 3, normal insight, judgement and memory, affect and mood normal    Labs:   Recent labs in [facility] reviewed by me today.  and   Most Recent 3 BMP's:  Recent Labs   Lab Test 12/06/19  0830 11/15/19  1054 11/11/19  0715    139 138   POTASSIUM 4.0 4.4 4.1   CHLORIDE 108 109 107   CO2 27 27 29   BUN 31*  24 28   CR 1.32* 1.02 1.05*   ANIONGAP 4 3 2*   BLAIR 9.2 10.2* 9.6   GLC 87 134* 93       ASSESSMENT/PLAN:  (M25.512,  G89.29) Chronic left shoulder pain  (primary encounter diagnosis)  (M51.36) DDD (degenerative disc disease), lumbar  (G89.4) Chronic pain syndrome  Comment: Pain improved with addition of steroids. Cervicalgia vs arthritis to left shoulder - likely unable to tolerate MRI. Follows with pain clinic. Agreeable to decrease in narcotics and steroids.   Plan: Will decreased dilaudid to 2mg BID PRN, decreased prednisone form 10mg to 5mg daily. Continue oxycontin 10mg BID, tizanidine TID, continue lidocaine patch, APAP,     (S32.042D) Closed fracture of ramus of left pubis with routine healing, subsequent encounter  Comment: Now primarily WC bound, pain controlled, remains a falls risk.   Plan: Decreased dilaudid/pain regimen as above. PT and OT to continue to treat. Encourage patient to request assists with transfers to decrease falls risk.     (K13.79) Oral cavity pain  Comment: d/t poor dentition, no s/s of infection currently  Plan: benzocaine (ORAJEL/ANBESOL) 10 % gel - QID PRN, nursing to assist with re-scheduling teeth extraction    (M81.0) Age-related osteoporosis without current pathological fracture  Comment: Recent pelvic fracture, not a candidate for alendronate d/t CKD  Plan: continue vitamin D, calcium supplement    (I10) Essential hypertension  Comment: Appears controlled, elevated at times d/t pain, anxiety  Plan: NIFEdipine ER OSMOTIC (PROCARDIA XL) 60 MG, furosemide, lisinopril,     (I50.32) Chronic diastolic congestive heart failure (H)  Comment: LE edema significantly improved, appears compensated  Plan: Continue lisinopril, furosemide, ASA, atorvastatin.     (N18.3) CKD (chronic kidney disease) stage 3, GFR 30-59 ml/min (H)  Comment: Stable, see labs, Baseline GFR 35-50.   Plan: avoid nephrotoxic medications, monitor BMP PRN    (R07.89) Atypical chest pain  Comment: Suspect  musculoskeletal started when patient was reaching for an object. Now completely resolved   Plan: Educated patient to update staff if pain re-occurs. Monitor.     transcribed by : Aliyah Milan  Orders:  1. Decrease Prednisone to 5 mg q day - dx: chronic left shoulder pain, DDD  2. Discontinue currently Dilaudid orders - start Dilaudid  2 mg PO BID PRN for severe pain (electronic Rx) sent  3. Orajel 10% or pharmacy equivalent - apply to gums QID PRN for pain - ok to leave at bedside and patient to self administer.  4. Order clarification: Oxycodone 5 mg PO TID and 5 mg PO TID PRN - give PRN doses at least 4 hours apart - dx: severe pain, chronic low back pain        Electronically signed by:  MARY Gómez CNP             Sincerely,        MARY Gómez CNP

## 2019-12-26 PROBLEM — R42 DIZZINESS: Status: RESOLVED | Noted: 2017-07-25 | Resolved: 2019-01-01

## 2019-12-26 PROBLEM — R06.02 SHORTNESS OF BREATH: Status: RESOLVED | Noted: 2019-02-19 | Resolved: 2019-01-01

## 2019-12-26 PROBLEM — R53.1 WEAKNESS: Status: RESOLVED | Noted: 2019-05-10 | Resolved: 2019-01-01

## 2019-12-26 PROBLEM — I65.21 CAROTID STENOSIS, SYMPTOMATIC W/O INFARCT, RIGHT: Chronic | Status: ACTIVE | Noted: 2017-08-31

## 2019-12-26 PROBLEM — D63.1 ANEMIA OF CHRONIC RENAL FAILURE, STAGE 4 (SEVERE) (H): Chronic | Status: ACTIVE | Noted: 2019-05-21

## 2019-12-26 PROBLEM — I10 ESSENTIAL HYPERTENSION: Chronic | Status: ACTIVE | Noted: 2017-06-20

## 2019-12-26 PROBLEM — G30.9 ALZHEIMER'S DEMENTIA WITHOUT BEHAVIORAL DISTURBANCE (H): Chronic | Status: ACTIVE | Noted: 2017-05-12

## 2019-12-26 PROBLEM — I10 ESSENTIAL HYPERTENSION: Chronic | Status: RESOLVED | Noted: 2017-06-20 | Resolved: 2019-01-01

## 2019-12-26 PROBLEM — N17.9 ACUTE KIDNEY INJURY (H): Status: RESOLVED | Noted: 2019-01-11 | Resolved: 2019-01-01

## 2019-12-26 PROBLEM — D64.9 ANEMIA, UNSPECIFIED TYPE: Status: RESOLVED | Noted: 2019-01-01 | Resolved: 2019-01-01

## 2019-12-26 PROBLEM — J44.1 COPD EXACERBATION (H): Status: RESOLVED | Noted: 2019-05-10 | Resolved: 2019-01-01

## 2019-12-26 PROBLEM — M79.609 TRIGGER POINT OF EXTREMITY: Status: RESOLVED | Noted: 2017-08-23 | Resolved: 2019-01-01

## 2019-12-26 PROBLEM — I63.9 CVA (CEREBRAL VASCULAR ACCIDENT) (H): Status: RESOLVED | Noted: 2017-07-26 | Resolved: 2019-01-01

## 2019-12-26 PROBLEM — J18.9 PNEUMONIA: Status: ACTIVE | Noted: 2019-01-01
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
## 2019-12-26 PROBLEM — N18.4 ANEMIA OF CHRONIC RENAL FAILURE, STAGE 4 (SEVERE) (H): Chronic | Status: ACTIVE | Noted: 2019-05-21

## 2019-12-26 PROBLEM — Z86.79 HISTORY OF INTRACRANIAL ANEURYSM: Chronic | Status: ACTIVE | Noted: 2017-04-14

## 2019-12-26 PROBLEM — F02.80 ALZHEIMER'S DEMENTIA WITHOUT BEHAVIORAL DISTURBANCE (H): Chronic | Status: ACTIVE | Noted: 2017-05-12

## 2019-12-26 PROBLEM — G45.9 TRANSIENT CEREBRAL ISCHEMIA, UNSPECIFIED TYPE: Chronic | Status: ACTIVE | Noted: 2017-08-01

## 2019-12-26 PROBLEM — J18.9 COMMUNITY ACQUIRED PNEUMONIA: Status: ACTIVE | Noted: 2019-01-01

## 2019-12-26 PROBLEM — M62.81 GENERALIZED MUSCLE WEAKNESS: Status: RESOLVED | Noted: 2017-07-25 | Resolved: 2019-01-01

## 2019-12-26 PROBLEM — I10 ESSENTIAL HYPERTENSION: Status: RESOLVED | Noted: 2017-06-20 | Resolved: 2019-01-01

## 2019-12-26 NOTE — ED NOTES
"Pt from the [facility] does not wear O2 there, sats 91% RA from EMS pt currently on O2 2 liters at 95% complaining of low back and \" butt pain \" no recent fall with SOA denies chest  pain  "

## 2019-12-26 NOTE — PLAN OF CARE
"[facility] ADMISSION NOTE    Patient admitted to room 2300 at approximately 1620 via cart from emergency room. Patient was accompanied by transport tech.     Verbal SBAR report received from Mayte ELKINS prior to patient arrival.     Patient trasferred to bed via air juanis. Patient alert and oriented X 3. Pain is controlled without any medications.  . Admission vital signs: Blood pressure (!) 145/62, pulse 76, temperature 97.7  F (36.5  C), temperature source Oral, resp. rate 20, height 1.676 m (5' 6\"), weight 83.9 kg (184 lb 15.5 oz), SpO2 95 %. Patient was oriented to plan of care, call light, bed controls, tv, telephone, bathroom and visiting hours.     Risk Assessment    The following safety risks were identified during admission: fall and isolation. Yellow risk band applied: YES.     Skin Initial Assessment    This writer admitted this patient and completed a full skin assessment and Will score in the Adult PCS flowsheet. Appropriate interventions initiated as needed.     Secondary skin check completed by Shruthi ELKINS.         Education    Patient has a [place] to Observation order: Yes  Observation education completed and documented: Yes      Angelina Monique RN    "

## 2019-12-26 NOTE — H&P
[facility]    History and Physical - Hospitalist Service       Date of Admission:  12/26/2019    Assessment & Plan   Jazmin Clifton is a 86 year old female admitted on 12/26/2019. She has a complex medical history of COPD/?restrictive lung disease, congestive heart failure, ?coronary artery disease,     Hypoxemia, suspect COPD exacerbation with possible community acquired pneumonia  ~1 week of cough, shortness of breath, mild fever/chills. 89 - 90% in ED. No PFTs on file, though COPD and restrictive lung disease both noted in history. Frequent coarse cough on admit. Appears mildly ill. CXR shows possible infiltrate in left lower lobe. Labs unremarkable. Suspect this is COPD exacerbation with possible pneumonia.   - order duonebs Q4, while awake  - order PRN albuterol  - initiate 40mg PO prednisone  - antibiotics: ceftriaxone and azithromycin  - respiratory viral panel, procalcitonin  - titrate oxygen to O2 Saturation 90%  - cough medications prn    Generalized Weakness  Noted by assisted living facility. Likely related to illness  - treatment as above  - may need PT/OT evaluation if not improving     Possible metabolic encephalopathy with history of dementia  Unable to give clear medical history, thinks she lives in [facility], not oriented to date. At other times give clear history of events. Noted history of dementia on Aricept though son does not agree with diagnosis and states it has been ruled out. Last SLUMs on file 27/30. States the noted confusion above is not typical for his mom.  - treat infection  - monitor mental status  - continue home donepezil      Chronic Diastolic Congestive Heart Failure   Appears euvolemic on exam. Mild pulmonary vasculature on CXR. Current weight of 185 lb, which is lower than previous in October 192 lb. Last echocardiogram on 2/2019 demonstrated an EF of 55-60% and normal LV, normal RV, mild left atrial dilation, mild atrial root dilation and moderate  diastolic dysfunction. Clinically appears more infectious so will treat for infection initially and consider diuresis if not improving or clinically indicated.  - Continue home furosemide daily   - May need additional diuresis, will observe for now as appears more infectious and weight is down  - Follow daily weights  - Judicious use of IVF    Coronary Artery Disease  Noted in history though unclear diagnosis, no prior cardiac testing on file to verify or notes from cardiology.  - continue home ASA, statin    Chronic pain, right hip, low back  Recent pelvic fracture  Follows with pain medicine, managed on acetaminophen, Cymbalta, gabapentin, oxycodone, and tizanidine.   - continue home Oxycotin, gabapentin, duloxetine    Hypertension  Chronic. Blood pressures reviewed, mildly elevated.   - continue home lisinopril, furosemide, nifedipine    Hyperlipidemia  Chronic.  - continue home atorvastatin    History of CVA/TIA  S/P Carotid Endarterectomy 2017  Brain aneurysm s/p clipping  History of CVA 2017 s/p endarterectomy in 2017 Previous clipping of aneurysm.  - continue home statin, aspirin    CKD  Creatinine 1.07, GFR 47. STable with baseline  - AM BMP     Chronic Normocytic Anemia  Hgb 10.6. Baseline recently 9-10. On iron.  - continue home iron  - AM CBC     History of DVT  Noted in history though cannot find clear documentation of this. Do see notes indicating DVT prophylaxis following surgery as well as history of superficial thrombophlebitis.     Diet: Consistent Carbohydrate Diet 5195-0216 Calories: Moderate Consistent CHO (4-6 CHO units/meal)    DVT Prophylaxis: Pneumatic Compression Devices  Maldonado Catheter: in place, indication:    Code Status: DNR/DNI, discussed directly on admit    Disposition Plan   Expected discharge: 2 - 3 days, recommended to prior living arrangement once respiratory status improves. Given current ill appearance and respiratory status, anticipate patient will need > 24 hours of  "hospitalization prior to being appropriate for discharge.  Entered: Alexa Mclaughlin PA-C 12/26/2019, 4:43 PM     The patient's care was discussed with the Attending Physician, Dr. Diaz Benavides, Patient and Patient's Family.    Alexa Mclaughlin PA-C  [facility]  ______________________________________________________________________    Chief Complaint   Cough, malaise    History is obtained from the patient and patient's son.    History of Present Illness   Jazmin Clifton is a 86 year old female who presents with coughing and malaise.    She states she developed a cough about 4 days prior with some mild shortness of breath. She has had mild fever and chills. She has some nausea and abdominal pain which is associated with coughing. No diarrhea. She denies chest pains or palpitations. She has had increased generalized weakness over the past few days, she does use a wheelchair in the assisted living facility from time to time. Unsure about orthopnea or PND.    Her son notes that she has had a cough for at least a week. He saw her 2 days prior and she had a \"hard cough\" but otherwise seemed to be getting around okay and not too ill appearing.    She is having some intermittent difficulties with memory during admission which her son says is unusual for her. She carries a diagnosis of alzhemier's though the son states this was a mis-diagnosis and she does not have memory troubles. She is on Aricept. Her last SLUMs was 27/30 at the assisted living facility. She initially tells me she lives in [facility] in [facility] [place] when she lives in [place] assisted living [facility]. She also has some difficulties stating her past medical history. She is alert and oriented to person, place, situation and president. She does not know the date but states it is winter and year is 2020 with talon recently occurring.    Review of Systems    The 10 point Review of Systems is negative other than noted in the " "HPI or here.     Past Medical History    I have reviewed this patient's medical history and updated it with pertinent information if needed.   Past Medical History:   Diagnosis Date     ABDOMINAL PAIN GENERALIZED 3/15/2006    1 month of abdominal pain that llqwhich radiates into her back and chest.  Pt was hospitilzed 2x for the pain at [facility] --pt hopsitized for 3 days.  Rcords not ab=vailable.  She was told either \" twisted intestine or blockage of bowel.  Pt feels it is the hiatal hernia.  Pt hospitilized again a second time  A month ago.  Ct scans x 2 showed \" nothing.\"  Pt had a barium and xrays.  Pt sa     Abdominal pain, generalized 3/15/2006    1 month of abdominal pain that llqwhich radiates into her back and chest.  Pt was hospitilzed 2x for the pain at [facility] --pt hopsitized for 3 days.  Rcords not ab=vailable.  She was told either \" twisted intestine or blockage of bowel.  Pt feels it is the hiatal hernia.  Pt hospitilized again a second time  A month ago.  Ct scans x 2 showed \" nothing.\"  Pt had a barium and xrays.  Pt sa     Atherosclerosis of renal artery (H)     Left renal artery stenosis     BENIGN HYPERTENSION 5/1/2006 5/1/2006   Increase catapres to 0.3 mg and decrease clonidine to 0.1 mg daily.  Recheck bp in 1 month.      Benign neoplasm of scalp and skin of neck     Seborrheic keratosis     Cerebral aneurysm, nonruptured     Cerebral Aneurysm     Congestive heart failure (H)      Depressive disorder, not elsewhere classified     Depression     Esophageal reflux     Gastroesophageal Reflux Disease     Female stress incontinence      Gastrointestinal malfunction arising from mental factors     Dyspepsia     Generalized osteoarthrosis, unspecified site     DJD-chronic back pain     Herpes zoster dermatitis of eyelid      Insomnia, unspecified      Lumbago     Chronic back pain     Other chest pain     Atypical Chest Pain     Other specified cardiac dysrhythmias(427.89)     " Bradycardia, improved on lower dose beta blockers      Rectocele     Grade 3     Unspecified disorder of kidney and ureter     Renal insufficiency     Unspecified essential hypertension      Past Surgical History   I have reviewed this patient's surgical history and updated it with pertinent information if needed.  Past Surgical History:   Procedure Laterality Date     CHOLECYSTECTOMY, LAPOROSCOPIC      Cholecystectomy, Laparoscopic     CYSTOSCOPY, BIOPSY URETHRA N/A 6/10/2019    Procedure: Cystoscopy With Biopsy, Removal Of Urethral Lesion;  Surgeon: Yesy Fong MD;  Location: [facility] OR     ENDARTERECTOMY CAROTID Right 2017    Procedure: ENDARTERECTOMY CAROTID;  RIGHT CAROTID ENDARTERECTOMY WITH EEG;  Surgeon: Hilario Parry MD;  Location: [facility] OR     HYSTERECTOMY, RAYMOND      oophorectomy,RAYMOND     SURGICAL HISTORY OF -       Laminectomy x 3     SURGICAL HISTORY OF -       MCA Aneurysm repair     SURGICAL HISTORY OF -       Bladder suspension (Bladder Repair x2)     SURGICAL HISTORY OF -       Bilateral Hand Surgeries for Arthritis x2     SURGICAL HISTORY OF -       Repair of a Cerebral Aneurysm     URETHROPLASTY N/A 6/10/2019    Procedure: Urethral Reconstruction;  Surgeon: Yesy Fong MD;  Location: [facility] OR     Social History   I have reviewed this patient's social history and updated it with pertinent information if needed.  Social History     Tobacco Use     Smoking status: Former Smoker     Last attempt to quit: 1992     Years since quittin.0     Smokeless tobacco: Never Used   Substance Use Topics     Alcohol use: Yes     Comment: wine occas.     Drug use: No     Family History   I have reviewed this patient's family history and updated it with pertinent information if needed.   Family History   Problem Relation Age of Onset     Cancer Mother         stomache     Cerebrovascular Disease Father      Heart Disease Father      Cancer Brother         lymphoma     Eye  Disorder Son      Asthma Son      Diabetes Son      Gastrointestinal Disease Son         no control over bladder or bowels     C.A.D. No family hx of      Hypertension No family hx of      Breast Cancer No family hx of      Cancer - colorectal No family hx of      Prostate Cancer No family hx of      Prior to Admission Medications   Prior to Admission Medications   Prescriptions Last Dose Informant Patient Reported? Taking?   BREO ELLIPTA 100-25 MCG/INH inhaler 2019 at 0800 [facility] No Yes   Sig: INHALE 1 PUFF BY MOUTH ONCE DAILY   Patient taking differently: Inhale 1 puff into the lungs daily    Blood Glucose Monitoring Suppl [facility]  Nursing [place] Yes No   Si times daily Call nurse for further directions if blood glucose is less than 80 or greater than 250   DOK PLUS 50-8.6 MG tablet 2019 at 0800 [facility] No Yes   Sig: TAKE 1 TABLET BY MOUTH TWICE DAILY   Patient taking differently: Take 1 tablet by mouth 2 times daily    DONEPEZIL HCL PO 2019 at [address] [place] [address] Yes Yes   Sig: Take 5 mg by mouth At Bedtime    DULoxetine (CYMBALTA) 30 MG capsule 2019 at 2000 [facility] No Yes   Sig: Take 1 capsule (30 mg) by mouth At Bedtime   DULoxetine (CYMBALTA) 60 MG capsule 2019 at 0800 [facility] No Yes   Sig: Take 1 capsule (60 mg) by mouth every morning   Emollient (MOISTURIZING LOTION EX) 2019 at  Nursing Home Yes Yes   Sig: Externally apply topically 2 times daily Bilateral lower extremeties   FEROSUL 325 (65 Fe) MG tablet 2019 at [address] [place] [address] No Yes   Sig: TAKE 1 TABLET BY MOUTH ONCE DAILY   Patient taking differently: Take 325 mg by mouth every evening    HYDROmorphone (DILAUDID) 2 MG tablet Unknown at Unknown time Nursing Home No No   Sig: Take 1 tablet (2 mg) by mouth 2 times daily as needed for severe pain   Lidocaine (LIDOCARE) 4 % Patch 2019 at 0800 on Nursing Home No Yes   Sig: Place 1 patch onto the skin daily To desired area (shoulder or hip).  On for 12hrs, off for 12hrs   Menthol, Topical Analgesic, (BIOFREEZE) 4 % GEL Unknown at Unknown time skilled nursing Yes No   Sig: Externally apply topically 4 times daily as needed To left shoulder and right hip   NIFEdipine ER (ADALAT CC) 60 MG 24 hr tablet 12/26/2019 at 0800 [facility] Home Yes Yes   Sig: Take 60 mg by mouth daily    OMEPRAZOLE PO 12/26/2019 at 0800 [facility] Home Yes Yes   Sig: Take 40 mg by mouth every morning   VITAMIN D3 1000 units tablet 12/26/2019 at 0800 skilled nursing No Yes   Sig: TAKE 1 TABLET BY MOUTH ONCE DAILY   Patient taking differently: Take 1,000 Units by mouth daily    acetaminophen (TYLENOL) 500 MG tablet 12/26/2019 at 0800 [facility] Home Yes Yes   Sig: Take 1,000 mg by mouth 3 times daily   aspirin (ASA) 81 MG chewable tablet 12/26/2019 at 0800 skilled nursing No Yes   Sig: Take 1 tablet (81 mg) by mouth daily   atorvastatin (LIPITOR) 40 MG tablet 12/25/2019 at [address] [place] [address] No Yes   Sig: TAKE 1 TABLET BY MOUTH ONCE DAILY   Patient taking differently: Take 40 mg by mouth At Bedtime    benzocaine (ORAJEL/ANBESOL) 10 % gel Unknown at Unknown time Nursing Home No No   Sig: Take by mouth 4 times daily as needed for moderate pain   bisacodyl (DULCOLAX) 10 MG suppository Unknown at Unknown time skilled nursing Yes No   Sig: Place 10 mg rectally daily as needed for constipation   calcium carbonate (TUMS) 500 MG chewable tablet Unknown at Unknown time Nursing Home No No   Sig: Take 1 tablet (500 mg) by mouth 3 times daily   Patient taking differently: Take 1 chew tab by mouth 3 times daily as needed    fluticasone (FLONASE) 50 MCG/ACT nasal spray 12/26/2019 at 0800 skilled nursing No Yes   Sig: Spray 1 spray into both nostrils daily   furosemide (LASIX) 20 MG tablet 12/26/2019 at 0800 skilled nursing No Yes   Sig: TAKE 1 TABLET BY MOUTH ONCE DAILY   Patient taking differently: Take 20 mg by mouth daily    gabapentin (NEURONTIN) 300 MG capsule 12/26/2019 at 0800 skilled nursing No Yes   Sig: TAKE 1 CAPSULE  BY MOUTH TWICE DAILY   Patient taking differently: Take 300 mg by mouth 2 times daily    hypromellose-dextran (ARTIFICAL TEARS) 0.1-0.3 % ophthalmic solution Unknown at Unknown time longterm Yes No   Sig: Place 1 drop into both eyes every 6 hours as needed for dry eyes   levalbuterol (XOPENEX) 1.25 MG/3ML neb solution 12/26/2019 at 0940 Nursing Home Yes Yes   Sig: Take 1 ampule by nebulization every 4 hours as needed for shortness of breath / dyspnea or wheezing And every 4 hours PRN   lisinopril (PRINIVIL/ZESTRIL) 10 MG tablet 12/26/2019 at 0800 longterm No Yes   Sig: TAKE 1 TABLET BY MOUTH EVERY MORNING   Patient taking differently: Take 10 mg by mouth daily    magnesium hydroxide (MILK OF MAGNESIA) 400 MG/5ML suspension Unknown at Unknown time longterm Yes No   Sig: Take 30 mLs by mouth daily as needed for constipation or heartburn   miconazole (MICATIN/MICRO GUARD) 2 % external powder Unknown at Unknown time Nursing Home No No   Sig: Apply topically 2 times daily as needed for other (Intetriginous candidiasis)   ondansetron (ZOFRAN) 4 MG tablet Unknown at Unknown time longterm Yes No   Sig: Take 4 mg by mouth every 6 hours as needed for nausea   order for DME More than a month at not on this Nursing Home No No   Sig: Equipment being ordered: Oxygen at 2 lpm via nasal cannula continuous with portability. Dx: respiratory failure, COPD   oxyCODONE (OXYCONTIN) 10 MG 12 hr tablet 12/26/2019 at 0800 longterm No Yes   Sig: Take 1 tablet (10 mg) by mouth every 12 hours   predniSONE (DELTASONE) 5 MG tablet 12/26/2019 at 0800 longterm No Yes   Sig: Take 1 tablet (5 mg) by mouth daily   tiZANidine (ZANAFLEX) 2 MG tablet Unknown at Unknown time longterm Yes No   Sig: Take 2 mg by mouth every 8 hours as needed       Facility-Administered Medications: None     Allergies   Allergies   Allergen Reactions     Accupril Other (See Comments)     Dizziness       Ace Inhibitors Unknown     Accupril -  dizziness     Augmentin Unknown     Blood-Group Specific Substance Other (See Comments)     Patient has a Non-specific antibody. Blood Product orders may be delayed.  Draw one red top and two purple top tubes for ALL Type and Screen/ Type and Crossmatch orders.      Levofloxacin Other (See Comments) and Muscle Pain (Myalgia)     Pt prescribed ciprofloxacin in Jan 2018 (no rxn documented)         Morphine Other (See Comments)     hallucinations     Nitrofurantoin Nausea and Vomiting     Norvasc [Amlodipine Besylate] Swelling     Leg swelling       Quinapril Other (See Comments) and Unknown     Hypertension. 3.29.18 - Takes and tolerates lisinopril         Physical Exam   Vital Signs: Temp: 97.7  F (36.5  C) Temp src: Oral BP: (!) 145/62 Pulse: 76 Heart Rate: 75 Resp: 20 SpO2: 95 % O2 Device: Nasal cannula Oxygen Delivery: 2 LPM  Weight: 184 lbs 15.46 oz    Constitutional: ill appearing, frequent coughing episodes with distress during these events otherwise no distress, awake, alert, cooperative, and appears stated age  Eyes: Lids and lashes normal, extra ocular muscles intact, sclera clear, conjunctiva normal  ENT: Normocephalic, without obvious abnormality, atraumatic, sinuses nontender on palpation, oral pharynx with moist mucous membranes.  Hematologic / Lymphatic: no cervical lymphadenopathy and no supraclavicular lymphadenopathy  Respiratory: Increased work of breathing during coughing episodes, fair air exchanged, slightly diminished, few crackles bilaterally, faint wheeze. No rhonchi.   Cardiovascular: egular rate and rhythm, and no murmur noted. Trace lower extremity edema. Radial and pedal pulses are 2+ bilaterally.  GI: normal bowel sounds, soft, non-distended, mildly tender throughout. No rebound or guarding.  Genitounirinary: deferred  Skin: normal skin color, texture, turgor  Musculoskeletal: Normal bulk and tone. Moves all 4 extremities appropriately.  Neurologic: Awake, alert, oriented to name, place,  situation and president. Not entirely oriented to time (winter 2020 near talon time)  Cranial nerves II-XII are grossly intact.   Neuropsychiatric: calm, cooperative, appropriate thought process/content, some difficulties with short term memory.    Data   Data reviewed today: I reviewed all medications, new labs and imaging results over the last 24 hours. I personally reviewed the chest x-ray image(s) showing mild increased pulmonary vasculature and possible left basilar infiltrate.    Recent Labs   Lab 12/26/19  1207   WBC 6.7   HGB 10.6*   MCV 95         POTASSIUM 3.9   CHLORIDE 111*   CO2 28   BUN 26   CR 1.07*   ANIONGAP 5   BLAIR 9.3   *   ALBUMIN 2.9*   PROTTOTAL 6.1*   BILITOTAL 0.3   ALKPHOS 81   ALT 16   AST 11   TROPI 0.018     Recent Results (from the past 24 hour(s))   XR Chest 2 Views    Narrative    CHEST TWO VIEWS   12/26/2019 1:45 PM     HISTORY: Cough.    COMPARISON: Chest CTA on 11/15/2019      Impression    IMPRESSION: Two views of the chest were obtained. Enlarged cardiac  silhouette and mild pulmonary vascular congestion. No significant  pleural effusion or pneumothorax. Dense left basilar/retrocardiac  pulmonary opacities, atelectasis versus infection.    PATRICA BARRAGAN MD

## 2019-12-26 NOTE — PROGRESS NOTES
[facility] Six-Month Telephone Assessment    6 month telephone assessment completed 11-23-19    ER visits: Yes -  [facility]  Hospitalizations: Yes -  [facility]  TCU stays: Yes -  [facility]  Significant health status changes: member is still struggling with pain   Falls/Injuries: Yes: injury of her shoulder   ADL/IADL changes: No  Changes in services: No - CC asked AL to f/u with CC if assessment or review of RS tool was needed     Caregiver Assessment follow up:  None     Goals: See POC in chart for goal progress documentation.  Updated     Will see member in 6 months for an annual health risk assessment.   Encouraged member to call CC with any questions or concerns in the meantime.     Mirian Weldon MA [facility] Coordinator   [phone]

## 2019-12-26 NOTE — ED PROVIDER NOTES
History     Chief Complaint   Patient presents with     Shortness of Breath     from [place] arrived by EMS 91% RA     HPI  Jazmin Clifton is a 86 year old female with history of hypertension, CKD stage III, COPD, CHF, and CVA who presents to the emergency department for evaluation of cough and shortness of breath.  Symptoms started 4 days ago.  Reports chills and low-grade fevers.  Denies chest pain.  Denies nausea or vomiting.  Denies abdominal pain.  Increased weakness the last 2 days and having difficulty getting around at her assisted living. Pt normally uses wheelchair to take herself to meals and get around.     Allergies:  Allergies   Allergen Reactions     Accupril Other (See Comments)     Dizziness       Ace Inhibitors Unknown     Accupril - dizziness     Augmentin Unknown     Blood-Group Specific Substance Other (See Comments)     Patient has a Non-specific antibody. Blood Product orders may be delayed.  Draw one red top and two purple top tubes for ALL Type and Screen/ Type and Crossmatch orders.      Levofloxacin Other (See Comments) and Muscle Pain (Myalgia)     Pt prescribed ciprofloxacin in Jan 2018 (no rxn documented)         Morphine Other (See Comments)     hallucinations     Nitrofurantoin Nausea and Vomiting     Norvasc [Amlodipine Besylate] Swelling     Leg swelling       Quinapril Other (See Comments) and Unknown     Hypertension. 3.29.18 - Takes and tolerates lisinopril           Problem List:    Patient Active Problem List    Diagnosis Date Noted     Chronic pain syndrome 11/08/2019     Priority: Medium     Lymphedema 11/08/2019     Priority: Medium     Anemia, unspecified type 11/08/2019     Priority: Medium     Pubic ramus fracture (H) 11/02/2019     Priority: Medium     Acute dysfunction of right eustachian tube 11/02/2019     Priority: Medium     Fall 11/02/2019     Priority: Medium     Coronary artery disease of native heart with stable angina pectoris, unspecified vessel or lesion  "type (H) 2019     Priority: Medium     Sialadenitis 2019     Priority: Medium     Post-operative state 06/10/2019     Priority: Medium     Mass of urethra 2019     Priority: Medium     Gross hematuria 2019     Priority: Medium     Anemia of chronic renal failure, stage 4 (severe) (H) 2019     Priority: Medium     COPD exacerbation (H) 05/10/2019     Priority: Medium     Weakness 05/10/2019     Priority: Medium     Shortness of breath 2019     Priority: Medium     Health Care Home 2019     Priority: Medium     [facility] Care Coordinator  Mirian Weldon MA, LSW  [phone]    [facility] CARE PLAN SUMMARY    Member Name:  Jazmin Clifton    Address:  [address] [address]    As of 19 (moved to another apt)  Phone: [phone] (home)    :  1932 Date of Assessment: 19  Change in condition visit    [place]  [facility] [place]: [id number] Medical Assistance Number: [id number]  Financial Worker:  [facility]   Case #:     FVP Care Coordinator:  Mirian Weldon MA, LSW CC Phone:  [phone]  CC Fax:  [phone]   [facility] Enrollment Date: 19 Case Mix:  H  Rate Cell:  E  Waiver Type: EW     Primary Emergency Contact:  Chi Clifton  Address: [address] [place] [address]  Mobile Phone: [phone]  Relation: Son    Secondary Emergency Contact: Lucius Clifton  Address: [address] [address] [address]   Home Phone: [phone]  Work Phone: [phone]  Relation: Son Language:  English  :  No   Health Care Agent/POA:   Advanced Directives/Living Will:     Primary Care Clinic/Phone/Fax:  [facility]/(p) [phone], (f) [phone] Primary Dx:  COPD J44.9  Secondary Dx: DDD M51.36  Cognitive Impairment G31.84   Primary Physician:  Krezowski, Junie Martita   Height:  5' 3\"  Weight:  203 lbs   Specialty Physician:    Audiologist:     Eye " Care Provider:   Dental Care Provider:  [facility]   [facility] [phone]   Other:        [employer] CURRENT SERVICES SUMMARY  Equipment owned/DME history:  Member son purchased electric wheelchair for member;    Member has scooter, lift chair, 3 wheeled walker   SERVICE TYPE/PROVIDER NAME/PHONE AUTH DATE FREQUENCY Units OR $ Amt DESCRIPTION   Medical Transportation: [facility] [phone]  Fax:  4-1-19 thru 3-30-20 review annually/PRN  as needed     Assisted Living: [facility] [address] (Assisted Living) [phone] 24 hr [facility]  Fax:  4-1-19 thru 3-30-20 review annually/PRN daily See RS/AL Tool      Supplies: [facility] [phone]  Fax:  4-1-19 thru 3-30-20 review annually/PRN   monthly 1 pull up per day   1 liner per day  XL pull ups     Regular liners      Abraham ESPARZA    [phone]   5-1-19 thru 3-30-20  as needed  Declined 6-1-19                    Acute kidney injury (H) 01/11/2019     Priority: Medium     Hip pain 10/29/2018     Priority: Medium     Impaired ambulation 10/29/2018     Priority: Medium     Morbid obesity (H) 06/19/2018     Priority: Medium     Multiple closed fractures of metatarsal bone 05/28/2018     Priority: Medium     Right groin mass 05/03/2018     Priority: Medium     Chronic diastolic congestive heart failure (H) 02/02/2018     Priority: Medium     History of stroke 02/02/2018     Priority: Medium     Mild cognitive impairment 09/22/2017     Priority: Medium     S/P carotid endarterectomy 09/06/2017     Priority: Medium     Underwent for symptomatic right carotid artery stenosis        Carotid stenosis, symptomatic w/o infarct, right 08/31/2017     Priority: Medium     Thyroid nodule 08/23/2017     Priority: Medium     Trigger point of extremity 08/23/2017     Priority: Medium     Trochanteric bursitis of right hip 08/23/2017     Priority: Medium     CKD (chronic kidney disease) stage 3, GFR 30-59 ml/min (H)  08/16/2017     Priority: Medium     Transient cerebral ischemia, unspecified type 08/01/2017     Priority: Medium     CVA (cerebral vascular accident) (H) 07/26/2017     Priority: Medium     Chronic obstructive pulmonary disease, unspecified COPD type (H) 07/25/2017     Priority: Medium     Generalized muscle weakness 07/25/2017     Priority: Medium     Dizziness 07/25/2017     Priority: Medium     Osteoarthritis of right hip, unspecified osteoarthritis type 07/25/2017     Priority: Medium     Essential hypertension 06/20/2017     Priority: Medium     Alzheimer's dementia without behavioral disturbance (H) 05/12/2017     Priority: Medium     Retention of urine 04/15/2017     Priority: Medium     History of intracranial aneurysm 04/14/2017     Priority: Medium     Postherpetic neuralgia 11/14/2016     Priority: Medium     Umbilical hernia without obstruction and without gangrene 06/03/2016     Priority: Medium     Spinal stenosis of lumbar region 01/11/2016     Priority: Medium     Spondylolisthesis, lumbar region 01/11/2016     Priority: Medium     BPPV (benign paroxysmal positional vertigo) 11/27/2014     Priority: Medium     Dyspepsia 11/27/2014     Priority: Medium     Female stress incontinence 11/27/2014     Priority: Medium     History of bradycardia 11/27/2014     Priority: Medium     History of DVT (deep vein thrombosis) 11/27/2014     Priority: Medium     History of herpes zoster 11/27/2014     Priority: Medium     Constipation 10/20/2012     Priority: Medium     Rectocele 08/31/2012     Priority: Medium     Hip joint replacement status 04/18/2012     Priority: Medium     Osteoarthritis 04/18/2012     Priority: Medium     DDD (degenerative disc disease), lumbar 04/18/2012     Priority: Medium     Mild major depression (H) 04/18/2012     Priority: Medium     Incontinence of urine 04/18/2012     Priority: Medium     Hyperlipidemia 12/07/2011     Priority: Medium     Osteoporosis 10/25/2011     Priority:  Medium     S/P laminectomy 10/25/2011     Priority: Medium     CARDIOVASCULAR SCREENING; LDL GOAL LESS THAN 100 10/31/2010     Priority: Medium     Normocytic anemia 02/17/2010     Priority: Medium     CHF (congestive heart failure) (H) 09/17/2008     Priority: Medium     Diastolic disfunction - ECHP 2008       Restrictive lung disease 09/17/2008     Priority: Medium     PFT 4/2008       Renovascular hypertension 11/09/2006     Priority: Medium     Problem list name updated by automated process. Provider to review       Benign neoplasm of colon 10/04/2006     Priority: Medium     Angiodysplasia - due for repeat 10 years.  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 NO edema. Continue current Therapy.HYPERTENSION: Dynacirc CR 10mg daily initiated today. Will continue adjusting blood pressure medicatins as needed until readings at target.VITAMIN D  DEFICIENCY - Completed therapy, discontinue ergocalciferol.ANEMIA, EPO DEFICIENCY - Hgb 10.2. Not on EPO. Continue observation for now. Recnet Hgb drop due to lumbar laminectomy.  Problem list name updated by automated process. Provider to review       Essential hypertension, benign 05/01/2006     Priority: Medium     Atherosclerosis of renal artery (H)      Priority: Medium     Left renal artery stenosis       Esophageal reflux      Priority: Medium     Gastroesophageal Reflux Disease       Cerebral aneurysm, nonruptured      Priority: Medium     Cerebral Aneurysm - unchanged on rcent MRI.  Seen by neurosurg.  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     Priority: Medium        Past Medical History:    Past Medical History:   Diagnosis Date     ABDOMINAL PAIN GENERALIZED 3/15/2006     Abdominal pain, generalized 3/15/2006     Atherosclerosis of renal artery (H)      BENIGN HYPERTENSION 5/1/2006     Benign neoplasm of scalp and skin of neck      Cerebral aneurysm, nonruptured      Congestive heart failure (H)      Depressive disorder, not elsewhere classified      Esophageal reflux      Female stress incontinence      Gastrointestinal malfunction arising from mental factors      Generalized osteoarthrosis, unspecified site      Herpes zoster dermatitis of eyelid      Insomnia, unspecified      Lumbago      Other chest pain      Other specified cardiac dysrhythmias(427.89)      Rectocele      Unspecified disorder of kidney and ureter      Unspecified essential hypertension        Past Surgical History:    Past Surgical History:   Procedure Laterality Date     CHOLECYSTECTOMY, LAPOROSCOPIC  1997    Cholecystectomy, Laparoscopic     CYSTOSCOPY, BIOPSY URETHRA N/A 6/10/2019    Procedure: Cystoscopy With Biopsy, Removal Of Urethral Lesion;  Surgeon: Yesy Fong MD;  Location: [facility] OR     ENDARTERECTOMY CAROTID Right 8/31/2017    Procedure: ENDARTERECTOMY CAROTID;  RIGHT CAROTID ENDARTERECTOMY WITH EEG;  Surgeon: Hilario Parry MD;  Location:  OR     HYSTERECTOMY, RAYMOND  1982    oophorectomy,RAYMOND     SURGICAL HISTORY OF -       Laminectomy x 3     SURGICAL HISTORY OF -       MCA Aneurysm repair     SURGICAL HISTORY OF -   1996    Bladder suspension (Bladder Repair x2)     SURGICAL HISTORY OF -       Bilateral Hand Surgeries for Arthritis x2     SURGICAL HISTORY OF -       Repair of a Cerebral Aneurysm     URETHROPLASTY N/A 6/10/2019    Procedure: Urethral Reconstruction;  Surgeon: Yesy Fong MD;  Location:   OR       Family History:    Family History   Problem Relation Age of Onset     Cancer Mother         stomache     Cerebrovascular Disease Father      Heart Disease Father      Cancer Brother         lymphoma     Eye Disorder Son      Asthma Son      Diabetes Son      Gastrointestinal Disease Son         no control over bladder or bowels     C.A.D. No family hx of      Hypertension No family hx of      Breast Cancer No family hx of      Cancer - colorectal No family hx of      Prostate Cancer No family hx of        Social History:  Marital Status:   [5]  Social History     Tobacco Use     Smoking status: Former Smoker     Last attempt to quit: 1992     Years since quittin.0     Smokeless tobacco: Never Used   Substance Use Topics     Alcohol use: Yes     Comment: wine occas.     Drug use: No        Medications:    acetaminophen (TYLENOL) 500 MG tablet  aspirin (ASA) 81 MG chewable tablet  atorvastatin (LIPITOR) 40 MG tablet  BREO ELLIPTA 100-25 MCG/INH inhaler  DOK PLUS 50-8.6 MG tablet  DONEPEZIL HCL PO  DULoxetine (CYMBALTA) 30 MG capsule  DULoxetine (CYMBALTA) 60 MG capsule  Emollient (MOISTURIZING LOTION EX)  FEROSUL 325 (65 Fe) MG tablet  fluticasone (FLONASE) 50 MCG/ACT nasal spray  furosemide (LASIX) 20 MG tablet  gabapentin (NEURONTIN) 300 MG capsule  levalbuterol (XOPENEX) 1.25 MG/3ML neb solution  Lidocaine (LIDOCARE) 4 % Patch  lisinopril (PRINIVIL/ZESTRIL) 10 MG tablet  NIFEdipine ER (ADALAT CC) 60 MG 24 hr tablet  OMEPRAZOLE PO  oxyCODONE (OXYCONTIN) 10 MG 12 hr tablet  predniSONE (DELTASONE) 5 MG tablet  VITAMIN D3 1000 units tablet  benzocaine (ORAJEL/ANBESOL) 10 % gel  bisacodyl (DULCOLAX) 10 MG suppository  Blood Glucose Monitoring Suppl CORY  calcium carbonate (TUMS) 500 MG chewable tablet  HYDROmorphone (DILAUDID) 2 MG tablet  hypromellose-dextran (ARTIFICAL TEARS) 0.1-0.3 % ophthalmic solution  magnesium hydroxide (MILK OF MAGNESIA) 400 MG/5ML suspension  Menthol, Topical  Analgesic, (BIOFREEZE) 4 % GEL  miconazole (MICATIN/MICRO GUARD) 2 % external powder  ondansetron (ZOFRAN) 4 MG tablet  order for DME  tiZANidine (ZANAFLEX) 2 MG tablet          Review of Systems   Constitutional: Positive for chills, fatigue and fever.   HENT: Positive for congestion.    Respiratory: Positive for cough, shortness of breath and wheezing.    Gastrointestinal: Negative for abdominal pain, constipation, diarrhea, nausea and vomiting.   Genitourinary: Negative.    Musculoskeletal: Negative.    Skin: Negative.    Neurological: Positive for weakness. Negative for dizziness, light-headedness and headaches.       Physical Exam   BP: (!) 140/69  Pulse: 82  Heart Rate: 80  Temp: 97.9  F (36.6  C)  Resp: 18  Weight: 83.9 kg (185 lb)  SpO2: 94 %      Physical Exam  Constitutional:       Appearance: She is well-developed.   HENT:      Head: Normocephalic and atraumatic.      Mouth/Throat:      Mouth: Mucous membranes are moist.   Cardiovascular:      Rate and Rhythm: Normal rate and regular rhythm.   Pulmonary:      Effort: Pulmonary effort is normal.      Breath sounds: Wheezing (throughout, expiratory.) and rhonchi (prominent in the bilateral bases.) present.   Musculoskeletal:      Comments: Both legs are wrapped for chronic edema.    Skin:     General: Skin is warm and dry.   Neurological:      General: No focal deficit present.      Mental Status: She is alert and oriented to person, place, and time.         ED Course        Procedures               EKG Interpretation:      Interpreted by Dr. Borjas  Time reviewed: 1241  Symptoms at time of EKG: short of breath   Rhythm: normal sinus   Rate: normal  Axis: normal  Ectopy: none  Conduction: normal  ST Segments/ T Waves: No ST-T wave changes  Q Waves: none  Comparison to prior: Unchanged from 11/15/2019    Clinical Impression: normal sinus       Results for orders placed or performed during the hospital encounter of 12/26/19 (from the past 24 hour(s))   CBC  with platelets differential   Result Value Ref Range    WBC 6.7 4.0 - 11.0 10e9/L    RBC Count 3.83 3.8 - 5.2 10e12/L    Hemoglobin 10.6 (L) 11.7 - 15.7 g/dL    Hematocrit 36.3 35.0 - 47.0 %    MCV 95 78 - 100 fl    MCH 27.7 26.5 - 33.0 pg    MCHC 29.2 (L) 31.5 - 36.5 g/dL    RDW 18.9 (H) 10.0 - 15.0 %    Platelet Count 195 150 - 450 10e9/L    Diff Method Automated Method     % Neutrophils 87.9 %    % Lymphocytes 7.5 %    % Monocytes 3.9 %    % Eosinophils 0.3 %    % Basophils 0.1 %    % Immature Granulocytes 0.3 %    Nucleated RBCs 0 0 /100    Absolute Neutrophil 5.9 1.6 - 8.3 10e9/L    Absolute Lymphocytes 0.5 (L) 0.8 - 5.3 10e9/L    Absolute Monocytes 0.3 0.0 - 1.3 10e9/L    Absolute Eosinophils 0.0 0.0 - 0.7 10e9/L    Absolute Basophils 0.0 0.0 - 0.2 10e9/L    Abs Immature Granulocytes 0.0 0 - 0.4 10e9/L    Absolute Nucleated RBC 0.0    Comprehensive metabolic panel   Result Value Ref Range    Sodium 144 133 - 144 mmol/L    Potassium 3.9 3.4 - 5.3 mmol/L    Chloride 111 (H) 94 - 109 mmol/L    Carbon Dioxide 28 20 - 32 mmol/L    Anion Gap 5 3 - 14 mmol/L    Glucose 202 (H) 70 - 99 mg/dL    Urea Nitrogen 26 7 - 30 mg/dL    Creatinine 1.07 (H) 0.52 - 1.04 mg/dL    GFR Estimate 47 (L) >60 mL/min/[1.73_m2]    GFR Estimate If Black 54 (L) >60 mL/min/[1.73_m2]    Calcium 9.3 8.5 - 10.1 mg/dL    Bilirubin Total 0.3 0.2 - 1.3 mg/dL    Albumin 2.9 (L) 3.4 - 5.0 g/dL    Protein Total 6.1 (L) 6.8 - 8.8 g/dL    Alkaline Phosphatase 81 40 - 150 U/L    ALT 16 0 - 50 U/L    AST 11 0 - 45 U/L   Troponin I   Result Value Ref Range    Troponin I ES 0.018 0.000 - 0.045 ug/L   NT pro BNP   Result Value Ref Range    N-Terminal Pro BNP Inpatient 793 0 - 1,800 pg/mL   Influenza A/B antigen   Result Value Ref Range    Influenza A/B Agn Specimen Nasal     Influenza A Negative NEG^Negative    Influenza B Negative NEG^Negative   XR Chest 2 Views    Narrative    CHEST TWO VIEWS   12/26/2019 1:45 PM     HISTORY: Cough.    COMPARISON: Chest  CTA on 11/15/2019      Impression    IMPRESSION: Two views of the chest were obtained. Enlarged cardiac  silhouette and mild pulmonary vascular congestion. No significant  pleural effusion or pneumothorax. Dense left basilar/retrocardiac  pulmonary opacities, atelectasis versus infection.    PATRICA BARRAGAN MD       Medications   cefTRIAXone IN D5W (ROCEPHIN) intermittent infusion 2 g (0 g Intravenous Stopped 12/26/19 1456)   azithromycin (ZITHROMAX) 500 mg in sodium chloride 0.9 % 250 mL intermittent infusion (500 mg Intravenous New Bag 12/26/19 1456)   azithromycin (ZITHROMAX) 250 mg in sodium chloride 0.9 % 250 mL intermittent infusion (has no administration in time range)   ipratropium - albuterol 0.5 mg/2.5 mg/3 mL (DUONEB) neb solution 3 mL (3 mLs Nebulization Given 12/26/19 1419)   predniSONE (DELTASONE) tablet 40 mg (40 mg Oral Given 12/26/19 1429)       Assessments & Plan (with Medical Decision Making)   86 year old female with history of hypertension, CKD stage III, COPD, CHF, and CVA who presents to the emergency department for evaluation of cough and shortness of breath.  Symptoms started 4 days ago.  Reports chills and low-grade fevers.  Denies chest pain.  Denies nausea or vomiting.  Denies abdominal pain.  Increased weakness the last 2 days and having difficulty getting around at her assisted living. Pt normally uses wheelchair to take herself to meals and get around.     On exam patient is alert and oriented.  Pleasant.  Lung sounds with expiratory wheezing throughout.  Rhonchi sounds in bilateral bases.  No tachypnea at rest.  Speaking in full sentences.  SPO2 dropped to 88% on room air, but improves with 2 L of oxygen at 95%.  No leukocytosis.  Electrolytes are normal.  Kidney function tests are at her baseline.  Troponin is normal.  BNP is normal.  Influenza is negative.  Chest x-ray reveals enlarged cardiac silhouette and mild pulmonary vascular congestion.  There is a dense left  basilar/retrocardiac pulmonary opacity.  Concern for a left lower lobe pneumonia and COPD exacerbation.  I discussed the lab and imaging tests with patient.  Given her age, profound weakness, and hypoxia I recommend patient stay in the hospital for management.  I spoke with hospitalist on-call, Dr. Benavides regarding admission.  Patient will be admitted to observation status.  A procalcitonin has been added as requested by the hospitalist.  Patient received a DuoNeb, p.o. prednisone 40 mg, IV ceftriaxone, and IV azithromycin.    I consulted in the care and management of this patient with the emergency physician, Dr. Borjas.    I have reviewed the nursing notes.    I have reviewed the findings, diagnosis, plan and need for follow up with the patient.    New Prescriptions    No medications on file       Final diagnoses:   Pneumonia of left lower lobe due to infectious organism (H)   Hypoxia   COPD exacerbation (H)       12/26/2019   [facility] EMERGENCY DEPARTMENT     Susy Soto APRN CNP  12/26/19 1523

## 2019-12-27 NOTE — CONSULTS
Care Transition Initial Assessment - RN      Met with: Patient.    DATA  Principal Problem:    Pneumonia  Active Problems:    Cerebral aneurysm, nonruptured    Essential hypertension, benign    Restrictive lung disease    DDD (degenerative disc disease), lumbar    Chronic obstructive pulmonary disease, unspecified COPD type (H)    CKD (chronic kidney disease) stage 3, GFR 30-59 ml/min (H)    S/P carotid endarterectomy    Mild cognitive impairment    Morbid obesity (H)    Alzheimer's dementia without behavioral disturbance (H)    Chronic diastolic congestive heart failure (H)    History of DVT (deep vein thrombosis)    History of stroke    Hyperlipidemia    Anemia of chronic renal failure, stage 4 (severe) (H)    Coronary artery disease of native heart with stable angina pectoris, unspecified vessel or lesion type (H)    Chronic pain syndrome    Community acquired pneumonia       Cognitive Status: awake, alert and oriented.  Primary Care Clinic Name:  geriatrics  Primary Care MD Name: Delores  Contact information and PCP information verified: Yes  Lives With: alone   Living Arrangements: assisted living  Quality of Family Relationships: supportive, involved, helpful  Description of Support System: Supportive, Involved   Who is your support system?: Children   Support Assessment: Adequate family and caregiver support   Insurance concerns: No Insurance issues identified        This writer met with pt, I introduced self and role. Patient currently lives at [facility] (phone: [phone] Fax: [phone]).  She has help with all ADLs at baseline.  She was recently at [facility] following hospitalization related to fall and femur fracture.  She is currently open with [facility] ([phone] Fax: [phone]) for PT and OT.  They are working with patient to regain safe mobility.  She has been transferring with 1 assist, mostly staying in her wheelchair.  She does not wear home oxygen.   Discussed discharge planning and medicare guidelines in regards to home care and SNF benefits.  Patient feels she will be safe to return to her apartment with continued work with therapy to inrease her ability to ambulate with a walker.  Patient has no interest in TCU at this time.  Spoke with SEVEOR Isbell at [facility].  She confirmed patient is able to return to [facility] when ready as long as she is requiring 1 assist or less.  Patient would like to continue with home care for therapy needs.  Order/referral placed for resumption.  Call made to son, Ed at patient's request to discuss discharge plans.  He will plan to transport patient back to [facility] when ready.     Pt/family was given the Medicare Compare list for Home Care, with associated star ratings to assist with choice for referrals/discharge planning Yes.  Education was given to pt/family that star ratings are updated/maintained by Medicare and can be reviewed by visiting www.medicare.gov Yes.      Patient has [facility] care coordinator, Mirian Weldon.  Email hand off sent with discharge plan.        PLAN    Return to [facility] w/ resumption of home care      ADDENDUM @ 1445:  MD identified patient would potentially be ready for early discharge .  Left message with son, Ed with tentative plan.  Requested he arrive for 10am discharge tomorrow.  Any new medications will need to be filled at [facility] outpatient pharmacy prior to discharge.  CTS to follow      SOLE Clements RN  Inpatient Care Coordinator  [facility] [phone]  [facility] [phone]      HOME CARE HAND OFF  Patient Name: Jazmin Clifton    MRN: [id number]    : 1932    Patient Zip Code: [address]    Admit Diagnosis: Hypoxia [R09.02]  COPD exacerbation (H) [J44.1]  Pneumonia of left lower lobe due to infectious organism (H) [J18.1]      Services Pt Needs at Home: PT and OT-resumption    Discharge Support: Independent-Alone    Living Arrangements: Assisted  living     or Address Other Than Pt: No    Wound Care: No    Anticipate DC Date: 12/28/2019

## 2019-12-27 NOTE — PROGRESS NOTES
Patient sats 93% on room air.  LS with expiratory wheezes.  Frequent, productive cough.  Robitussin and Tessalon etienne providing some relief.  Continues to be short of breath with activity.  Using call light appropriately.

## 2019-12-27 NOTE — PROGRESS NOTES
12/27/19 1100   Quick Adds   Type of Visit Initial Occupational Therapy Evaluation   Living Environment   Lives With alone   Living Arrangements assisted living   Home Accessibility no concerns   Living Environment Comment walk-in shower with shower chair. Grab bars in shower and toilet.    Functional Level   Ambulation 4-->completely dependent   Transferring 1-->assistive equipment   Toileting 1-->assistive equipment   Bathing 3-->assistive equipment and person   Dressing 3-->assistive equipment and person   Eating 0-->independent   Communication 0-->understands/communicates without difficulty   Swallowing 0-->swallows foods/liquids without difficulty   Cognition 0 - no cognition issues reported   Fall history within last six months yes   Number of times patient has fallen within last six months 1   Prior Functional Level Comment Pt reports TERRA does not want her walking with her 3 wheeled walker. So son took it home. Pt has been getting up to manual w/c to move about the apartment. Then using motorized w/c to get down to meals. States she has not been walking. Has assist with socks and lymphedema wraps/socks and assist with showering. Has assist getting into/out of bed.    General Information   Onset of Illness/Injury or Date of Surgery - Date 12/26/19   Referring Physician Diaz Benavides MD   Patient/Family Goals Statement To return home to [facility]. wants to walk more.    Additional Occupational Profile Info/Pertinent History of Current Problem Hypoxemia, suspect COPD exacerbation with possible community acquired pneumonia   Precautions/Limitations fall precautions   Cognitive Status Examination   Level of Consciousness alert   Follows Commands (Cognition) WNL   Visual Perception   Visual Perception Wears glasses   Pain Assessment   Patient Currently in Pain No   Strength   Strength Comments Not formally assessed, however pt does become LACEY.    Transfer Skill: Bed to Chair/Chair to Bed   Level of  "[place]: Bed to Chair stand-by assist   Physical Assist/Nonphysical Assist: Bed to Chair supervision   Assistive Device - Transfer Skill Bed to Chair Chair to Bed Rehab Eval standard walker   Transfer Skill: Sit to Stand   Level of [place]: Sit/Stand stand-by assist   Physical Assist/Nonphysical Assist: Sit/Stand supervision   Assistive Device for Transfer: Sit/Stand standard walker   Transfer Skill: Toilet Transfer   Level of [place]: Toilet   (NT)   Upper Body Dressing   Level of [place]: Dress Upper Body independent   Lower Body Dressing   Level of [place]: Dress Lower Body moderate assist (50% patients effort)  (pt appears at baseline. )   Physical Assist/Nonphysical Assist: Dress Lower Body 1 person assist   Instrumental Activities of Daily Living (IADL)   IADL Comments facility completes IADLs.    Activities of Daily Living Analysis   Impairments Contributing to Impaired Activities of Daily Living strength decreased   General Therapy Interventions   Planned Therapy Interventions ADL retraining   Clinical Impression   Criteria for Skilled Therapeutic Interventions Met yes, treatment indicated   OT Diagnosis decreased independence with ADLs   Influenced by the following impairments weakness   Assessment of Occupational Performance 1-3 Performance Deficits   Identified Performance Deficits toilet transfer    Clinical Decision Making (Complexity) Low complexity   Therapy Frequency Daily   Predicted Duration of Therapy Intervention (days/wks) 2 days   Anticipated Discharge Disposition Home with Home Therapy  (return to [facility] with continued services. )   Risks and Benefits of Treatment have been explained. Yes   Patient, Family & other staff in agreement with plan of care Yes   [facility] AM-PAC  \"6 Clicks\" Daily Activity Inpatient Short Form   1. Putting on and taking off regular lower body clothing? 2 - A Lot   2. Bathing (including washing, rinsing, drying)? 2 - A Lot   3. Toileting, " which includes using toilet, bedpan or urinal? 3 - A Little   4. Putting on and taking off regular upper body clothing? 4 - None   5. Taking care of personal grooming such as brushing teeth? 4 - None   6. Eating meals? 4 - None   Daily Activity Raw Score (Score out of 24.Lower scores equate to lower levels of function) 19   Total Evaluation Time   Total Evaluation Time (Minutes) 10

## 2019-12-27 NOTE — PROGRESS NOTES
12/27/19 1114   Quick Adds   Type of Visit Initial PT Evaluation   Living Environment   Lives With alone   Living Arrangements assisted living   Home Accessibility no concerns   Functional Level Prior   Ambulation 4-->completely dependent   Transferring 1-->assistive equipment   Toileting 1-->assistive equipment   Bathing 3-->assistive equipment and person   Communication 0-->understands/communicates without difficulty   Swallowing 0-->swallows foods/liquids without difficulty   Cognition 0 - no cognition issues reported   Fall history within last six months yes   Number of times patient has fallen within last six months 1   Prior Functional Level Comment Per previous PT documentation- Per patient limited mobilty at baseline. Patient uses  motorized scooter for going down to cafeteria, otherwise will use FWW for very short distance mobility within her room.. Pt now reports she transfers indep; uses a manual wc within her apartment   General Information   Onset of Illness/Injury or Date of Surgery - Date 12/26/19   Referring Physician Makayla   Patient/Family Goals Statement Return home; additional goal of being able to walk again   Pertinent History of Current Problem (include personal factors and/or comorbidities that impact the POC) 86 year old female with history of hypertension, CKD stage III, COPD, CHF, and CVA who presents to the emergency department for evaluation of cough and shortness of breath   Precautions/Limitations fall precautions   General Observations Pt alert,  feels she can mange at home w/ previous services    Strength   Strength Comments LE strength 4/5   Transfer Skills   Transfer Comments BED. chair w/ RW SBA to in dep. transfers well, able to discuss Home set up . Transfers well   Gait   Gait Comments  Pt transfers well w/ use of Rw. SBA to indep.; has a system for transfers at home; utilizes a manual wheelchair for mobility within her apartment.  Pt also ambulated 10 feet x2 with RW and  "SBA to CGA   Balance   Balance Comments fair/ good dynamic standing balance   Muscle Tone   Muscle Tone Comments intermittent  tremor  RLE    General Therapy Interventions   Planned Therapy Interventions bed mobility training;gait training;strengthening   Clinical Impression   Criteria for Skilled Therapeutic Intervention yes, treatment indicated   PT Diagnosis Generalized weakness   Influenced by the following impairments Decreased strength/ activity toleance; balance   Functional limitations due to impairments decreased amb. status, fall risk    Clinical Presentation Stable/Uncomplicated   Clinical Presentation Rationale clinical  judgement   Clinical Decision Making (Complexity) Low complexity   Therapy Frequency Daily   Predicted Duration of Therapy Intervention (days/wks) LOS    Anticipated Equipment Needs at Discharge   (? Home care PT to assist w/ obtaiing new RW)   Anticipated Discharge Disposition Home with Home Therapy   Risk & Benefits of therapy have been explained Yes   Patient, Family & other staff in agreement with plan of care Yes   [facility] AM-PAC  \"6 Clicks\" V.2 Basic Mobility Inpatient Short Form   1. Turning from your back to your side while in a flat bed without using bedrails? 4 - None   2. Moving from lying on your back to sitting on the side of a flat bed without using bedrails? 4 - None   3. Moving to and from a bed to a chair (including a wheelchair)? 4 - None   4. Standing up from a chair using your arms (e.g., wheelchair, or bedside chair)? 4 - None   5. To walk in hospital room? 3 - A Little   6. Climbing 3-5 steps with a railing? 3 - A Little   Basic Mobility Raw Score (Score out of 24.Lower scores equate to lower levels of function) 22     "

## 2019-12-27 NOTE — PROGRESS NOTES
Writer completed baseline admit skin assessment with primary nurse Angelina ELKINS. No overt open areas.

## 2019-12-27 NOTE — PLAN OF CARE
"Discharge Planner OT   Patient plan for discharge: Return to [facility]    Current status: Eval complete. Pt states at baseline she uses w/c in apartment and motorized scooter to get down to meals. Has assist with dressing, showering and someone is supervising when she gets in/out of bed.   Today pt completes supine to sit, sit to stand and transfers to bedside chair all with SBA and FWW.     Barriers to return to prior living situation: None    Recommendations for discharge: return to [facility] with continued services. Pt states she has been getting home therapy and wants to continue. Wanting to walk more at [facility], however states \"they took my three wheeled walker away, they dont want me using it\".     Rationale for recommendations: Pt is likely at or near baseline for ADLs       Entered by: Christine Cardona 12/27/2019 11:14 AM       "

## 2019-12-27 NOTE — PROGRESS NOTES
TRANSITIONS OF CARE (AJAY) LOG   AJAY tasks should be completed by the CC within one (1) business day of notification of each transition. Follow up contact with member is required after return to their usual care setting.  Note:  If CC finds out about the transitions fifteen (15) days or more after the member has returned to their usual care setting, no AJAY log is needed. However, the CC should check in with the member to discuss the transition process, any changes needed to the care plan and document it in a case note.    Member Name:  Jazmin Clifton [facility] Name:  [employer] Member ID#: [id number]   Product: [facility] Care Coordinator Contact:  Mirian Weldon MA, \A Chronology of [place] [facility]\"" Agency/[facility]: [facility]   Transition Communication Actions from Care Management Contact   Transition #1   Notification Date: 12-27-19 Transition Date:   12-26-19  Transition From: Assisted Living, [facility]      Is this the member s usual care setting?               yes Transition To: [facility]    Transition Type:  Unplanned  Reason for Admission/Comments:  Pneumonia      Shared CC contact info, care plan/services with receiving setting--Date completed: 12-27-19    Notified PCP of transition--Date completed:  12-26-19     via  EMR   Transition #2   Transition #3  (if applicable)   Notification Date: 12-31-19         Transition To:  Assisted Living, [facility]   Transition Date: 12-29-19     Transition Type:    Planned  Notified PCP -- Date completed: 12-29-19              Shared CC contact info, care plan/services with receiving setting or, if applicable, home care agency--Date completed:  12-31-19  *Complete additional tasks below, if this transition is a return to usual care setting.      Comments:  CC reached out to RN team at AL and asked that they f/u with CC if any concerns or questions      Notification Date:          Transition To:    Transition Date:               Transition Type:      Notified PCP--Date completed:          Shared CC contact info, care plan/services with receiving setting or, if applicable, home care agency--Date completed:       *Complete additional tasks below, if this transition is a return to usual care setting.      Comments:       *Complete tasks below when the member is discharging TO their usual care setting within one (1) business day of notification.  For situations where the Care Coordinator is notified of the discharge prior to the date of discharge, the Care Coordinator must follow up with the member or designated representative to confirm that discharge actually occurred and discuss required AJAY tasks as outlined in the AJAY Instructions.  (This includes situations where it may be a  new  usual care setting for the member. (i.e., a community member who decides upon permanent nursing home placement following hospitalization and rehab).    Date completed: 12-31-19  Communicated with member or their designated representative about the following:  care transition process; about changes to the member s health status; plan of care updates; education about transitions and how to prevent unplanned transitions/readmissions  Four Pillars for Optimal Transition:    Check  Yes  - if the member, family member and/or SNF/facility staff manages the following:    If  No  provide explanation in the comments section.          [x]  Yes     []  No     Does the member have a follow-up appointment scheduled with primary care or specialist? (Mental health hospitalizations--the appt. should be w/in 7 days)   [x]  Yes     []  No     Can the member manage their medications or is there a system in place to manage medications (e.g. home care set-up)?         [x]  Yes     []  No     Can the member verbalize warning signs and symptoms to watch for and how to respond?         [x]  Yes     []  No     Does the member use a Personal Health Care Record?  Check  Yes  if visit  summary, discharge summary, and/or healthcare summary are being used as a PHR.                                                                                                                                                                                    [x] Yes      [] No      Have you updated the member s care plan?  If  No  provide explanation in comments.   Comments:

## 2019-12-27 NOTE — PLAN OF CARE
Discharge Planner PT   Patient plan for discharge: return to care home    Current status:  Eval completed- Pt transfers well w/ use of Rw. SBA to indep.; has a system for transfers at home; utilizes a manual wheelchair for mobility within her apartment.  Pt also ambulated 10 feet x2 with RW and SBA to CGA     Barriers to return to prior living situation: medical stability     Recommendations for discharge: Home- return to care home w/ services.  Home care PT as pt would like to return to ambulating short distances- states she currently is not allowed to ambulate within her apartment because of decreased balance, falls.  Pt feels she can not amb indep; but would be approp to be set up on an ambulation program    Rationale for recommendations: Pt transfers well- appears approp to discharge to home w/ HC services   Basic 6 Clicks Mobility Assessment score        Entered by: Honey Espinoza 12/27/2019 11:28 AM

## 2019-12-27 NOTE — PROGRESS NOTES
[facility]    Hospitalist Progress Note    Date of Service (when I saw the patient): 12/27/2019    Assessment & Plan   Jzamin Clifton is a 86 year old female who was admitted on 12/26/2019 with cough and dyspnea.    COPD exacerbation  Viral URI  ~1 week of cough, shortness of breath, mild fever/chills. 89 - 90% in ED. No PFTs on file, though COPD and restrictive lung disease both noted in history. Frequent coarse cough on admit. CXR shows infiltrate vs atelectasis in left lower lobe. Labs unremarkable. Suspect this is COPD exacerbation.   - order duonebs Q4, while awake  - order PRN albuterol  - initiate 40mg PO prednisone  - Started ceftriaxone and azithromycin on admission, but will stop as procalcitonin <0.05, WBC 6.7, and patient afebrile.  - respiratory viral panel negative  - Oxygenating well on room air  - cough medications prn     Generalized Weakness  Noted by assisted living facility. Likely related to illness  - PT/OT recommending return to [facility]     Possible metabolic encephalopathy with history of dementia  Unable to give clear medical history, thinks she lives in [facility], not oriented to date. At other times give clear history of events. Noted history of dementia on Aricept though son does not agree with diagnosis and states it has been ruled out. Last SLUMs on file 27/30. States the noted confusion above is not typical for his mom.  - treat infection  - monitor mental status  - continue home donepezil    - appears more clear on AM of 12/27/19     Chronic Diastolic Congestive Heart Failure   Appears euvolemic on exam. Mild pulmonary vasculature on CXR. Current weight of 185 lb, which is lower than previous in October 192 lb. Last echocardiogram on 2/2019 demonstrated an EF of 55-60% and normal LV, normal RV, mild left atrial dilation, mild atrial root dilation and moderate diastolic dysfunction. Clinically appears more infectious so will treat for infection initially and  consider diuresis if not improving or clinically indicated.  - Continue home furosemide daily   - Follow daily weights     Coronary Artery Disease  Noted in history though unclear diagnosis, no prior cardiac testing on file to verify or notes from cardiology.  - continue home ASA, statin     Chronic pain, right hip, low back  Recent pelvic fracture  Follows with pain medicine, managed on acetaminophen, Cymbalta, gabapentin, oxycodone, and tizanidine.   - continue home Oxycotin, gabapentin, duloxetine     Hypertension  Chronic. Blood pressures reviewed, mildly elevated.   - continue home lisinopril, furosemide, nifedipine     Hyperlipidemia  Chronic.  - continue home atorvastatin     History of CVA/TIA  S/P Carotid Endarterectomy 2017  Brain aneurysm s/p clipping  History of CVA 2017 s/p endarterectomy in 2017 Previous clipping of aneurysm.  - continue home statin, aspirin     CKD  Creatinine 1.07, GFR 47. STable with baseline  - AM BMP      Chronic Normocytic Anemia  Hgb 10.6. Baseline recently 9-10. On iron.  - continue home iron     History of DVT  Noted in history though cannot find clear documentation of this. Do see notes indicating DVT prophylaxis following surgery as well as history of superficial thrombophlebitis.     Diet: Consistent Carbohydrate Diet 7615-4553 Calories: Moderate Consistent CHO (4-6 CHO units/meal)    DVT Prophylaxis: Pneumatic Compression Devices  Maldonado Catheter: in place, indication:    Code Status: DNR/DNI, discussed directly on admit    Disposition  The patient can likely discharge back to [facility] in the next 1-2 days.     Diaz Benavides  Text Page (7 am to 6 pm)    Interval History   The patient is sitting in a chair, eating breakfast.  She feels improved today, but continues to have lethargy and frequent cough.    -Data reviewed today: I reviewed all new labs and imaging results over the last 24 hours. I personally reviewed no images or EKG's today.    Physical Exam   Temp: 98  F  (36.7  C) Temp src: Oral BP: 137/59 Pulse: 75 Heart Rate: 71 Resp: 16 SpO2: 92 % O2 Device: None (Room air) Oxygen Delivery: 2 LPM  Vitals:    12/26/19 1158 12/26/19 1629   Weight: 83.9 kg (185 lb) 83.9 kg (184 lb 15.5 oz)     Vital Signs with Ranges  Temp:  [97.4  F (36.3  C)-98.2  F (36.8  C)] 98  F (36.7  C)  Pulse:  [63-82] 75  Heart Rate:  [71-94] 71  Resp:  [16-27] 16  BP: (133-159)/(52-87) 137/59  SpO2:  [92 %-96 %] 92 %  No intake/output data recorded.    Gen: Well nourished, well developed, alert and oriented x 3, no acute distressed  HEENT: Atraumatic, normocephalic  Lungs: Diffuse bilateral wheezes without rhonchi or rales  Heart: Regular rate and rhythm, no gallops or rubs, no murmurs  GI: Bowel sound normal, no hepatosplenomegaly or masses  Lymph: No lymphadenopathy, 1 + BLE edema  Skin: No rashes     Medications       acetaminophen  1,000 mg Oral TID     aspirin  81 mg Oral Daily     atorvastatin  40 mg Oral At Bedtime     azithromycin  250 mg Intravenous Q24H     cefTRIAXone  2 g Intravenous Q24H     donepezil  5 mg Oral At Bedtime     DULoxetine  30 mg Oral At Bedtime     DULoxetine  60 mg Oral QAM     ferrous sulfate  325 mg Oral QPM     furosemide  20 mg Oral Daily     gabapentin  300 mg Oral BID     ipratropium - albuterol 0.5 mg/2.5 mg/3 mL  3 mL Nebulization Q4H While awake     lisinopril  10 mg Oral QAM     NIFEdipine ER OSMOTIC  60 mg Oral Daily     omeprazole  40 mg Oral QAM     oxyCODONE  10 mg Oral Q12H     predniSONE  40 mg Oral Daily     sodium chloride (PF)  3 mL Intracatheter Q8H       Data   Recent Labs   Lab 12/27/19  0532 12/26/19  1207   WBC  --  6.7   HGB  --  10.6*   MCV  --  95   PLT  --  195    144   POTASSIUM 4.0 3.9   CHLORIDE 109 111*   CO2 30 28   BUN 24 26   CR 0.89 1.07*   ANIONGAP 4 5   BLAIR 9.2 9.3   * 202*   ALBUMIN  --  2.9*   PROTTOTAL  --  6.1*   BILITOTAL  --  0.3   ALKPHOS  --  81   ALT  --  16   AST  --  11   TROPI  --  0.018       Recent Results (from  the past 24 hour(s))   XR Chest 2 Views    Narrative    CHEST TWO VIEWS   12/26/2019 1:45 PM     HISTORY: Cough.    COMPARISON: Chest CTA on 11/15/2019      Impression    IMPRESSION: Two views of the chest were obtained. Enlarged cardiac  silhouette and mild pulmonary vascular congestion. No significant  pleural effusion or pneumothorax. Dense left basilar/retrocardiac  pulmonary opacities, atelectasis versus infection.    PATRICA BARRAGAN MD

## 2019-12-27 NOTE — PLAN OF CARE
Lung sounds with expiratory wheezes and coarse.  Oxygen on at 2LPM and oxygen saturation levels 92-94%.  SOB with activity.  Occasional cough, PRN tessalon given.  Calls with needs.  Up to bathroom with walker and gait belt, moves slowly.

## 2019-12-27 NOTE — PROGRESS NOTES
12-27-19   CC had been in contact with RN at the [facility] when member was sent into the ED yesterday and today CC received notification that member was admitted due to pneumonia.     CC will f/u as needed   Mirian Weldon MA [facility] Coordinator   [phone]

## 2019-12-27 NOTE — PROGRESS NOTES
DATE:  12/27/2019   TIME OF RECEIPT FROM LAB:  1440  LAB TEST:  Respiratory Viral Panel  LAB VALUE:  Positive for RSV Type A  RESULTS GIVEN WITH READ-BACK TO (PROVIDER):  Diaz Benavides MD  TIME LAB VALUE REPORTED TO PROVIDER:   7457

## 2019-12-28 NOTE — PLAN OF CARE
Patient is alert and oriented. On droplet precautions for CAP. + RSV type A. LS expiratory wheezes/coarse. Infrequent productive cough. Receiving NEBS, Robitussin and Tessalon Pearls, which have been effective. On 2 LPM over night b/c sats dip. LACEY. Up w/asst 1 walker gb. S/L. Pt is very weak and exhausted. Plan to discharge to [facility] living today at 1000. Son will provide transport.

## 2019-12-28 NOTE — PROGRESS NOTES
Patient reports improvement in condition, but continues to feel weak and SOA with activity.  LS with expiratory wheezes.  Continues to have persistent cough.  Sats 94% on room air.

## 2019-12-28 NOTE — PROGRESS NOTES
[facility]      Subjective:  Very weak  Wheezing  At this AM  Needed oxygen overnight    Review of Systems:  CONSTITUTIONAL:weak   INTEGUMENTARY/SKIN: NEGATIVE for worrisome rashes, moles or lesions  EYES: NEGATIVE for vision changes or irritation  ENT/MOUTH: NEGATIVE for ear, mouth and throat problems  RESP:cough, sob  BREAST: NEGATIVE for masses, tenderness or discharge  CV: NEGATIVE for chest pain, palpitations or peripheral edema  GI: NEGATIVE for nausea, abdominal pain, heartburn, or change in bowel habits  : NEGATIVE for frequency, dysuria, or hematuria  MUSCULOSKELETAL: NEGATIVE for significant arthralgias or myalgia  NEURO: NEGATIVE for weakness, dizziness or paresthesias  ENDOCRINE: NEGATIVE for temperature intolerance, skin/hair changes  HEME: NEGATIVE for bleeding problems  PSYCHIATRIC: NEGATIVE for changes in mood or affect    Physical Exam:  Vitals Were Reviewed    Patient Vitals for the past 16 hrs:   BP Temp Temp src Heart Rate Resp SpO2   12/28/19 0855 -- -- -- -- -- 95 %   12/28/19 0651 138/54 98.7  F (37.1  C) Oral 77 18 95 %   12/28/19 0308 133/53 98.8  F (37.1  C) Oral 78 18 95 %   12/27/19 2220 (!) 148/54 98.9  F (37.2  C) Oral 82 18 91 %   12/27/19 1951 -- -- -- -- -- 95 %   12/27/19 1948 -- -- -- -- -- 94 %   12/27/19 1847 113/42 98.7  F (37.1  C) Oral 89 18 96 %         Intake/Output Summary (Last 24 hours) at 12/28/2019 1005  Last data filed at 12/27/2019 1430  Gross per 24 hour   Intake 363 ml   Output --   Net 363 ml       GENERAL APPEARANCE: healthy, alert and no distress  EYES: conjunctiva clear, eyes grossly normal  RESP: lungs clear to auscultation - no rales, rhonchi or wheezes  RESP: persistent cough, diffuse exp wheezes  CV: regular rate and rhythm, normal S1 S2, no S3 or S4 and no murmur, click or rub   ABDOMEN: soft, nontender, no HSM or masses and bowel sounds normal  MS: no clubbing, cyanosis; no edema  SKIN: clear without significant rashes or  lesions    Lab:  Recent Labs   Lab Test 12/27/19  0532 12/26/19  1207    144   POTASSIUM 4.0 3.9   CHLORIDE 109 111*   CO2 30 28   ANIONGAP 4 5   * 202*   BUN 24 26   CR 0.89 1.07*   BLAIR 9.2 9.3     CBC RESULTS:   Recent Labs   Lab Test 12/28/19  0608 12/26/19  1207   WBC 6.8 6.7   RBC 3.34* 3.83   HGB 9.4* 10.6*   HCT 30.9* 36.3    195       Results for orders placed or performed during the hospital encounter of 12/26/19 (from the past 24 hour(s))   CBC with platelets differential   Result Value Ref Range    WBC 6.8 4.0 - 11.0 10e9/L    RBC Count 3.34 (L) 3.8 - 5.2 10e12/L    Hemoglobin 9.4 (L) 11.7 - 15.7 g/dL    Hematocrit 30.9 (L) 35.0 - 47.0 %    MCV 93 78 - 100 fl    MCH 28.1 26.5 - 33.0 pg    MCHC 30.4 (L) 31.5 - 36.5 g/dL    RDW 18.8 (H) 10.0 - 15.0 %    Platelet Count 196 150 - 450 10e9/L    Diff Method Automated Method     % Neutrophils 75.6 %    % Lymphocytes 16.8 %    % Monocytes 7.1 %    % Eosinophils 0.0 %    % Basophils 0.1 %    % Immature Granulocytes 0.4 %    Nucleated RBCs 0 0 /100    Absolute Neutrophil 5.1 1.6 - 8.3 10e9/L    Absolute Lymphocytes 1.1 0.8 - 5.3 10e9/L    Absolute Monocytes 0.5 0.0 - 1.3 10e9/L    Absolute Eosinophils 0.0 0.0 - 0.7 10e9/L    Absolute Basophils 0.0 0.0 - 0.2 10e9/L    Abs Immature Granulocytes 0.0 0 - 0.4 10e9/L    Absolute Nucleated RBC 0.0        Assessment and Plan:    Jazmin Clifton is a 86 year old female who was admitted on 12/26/2019 with cough and dyspnea.     COPD exacerbation secondary to acute RSV infection  ~1 week of cough, shortness of breath, mild fever/chills. 89 - 90% in ED. No PFTs on file, though COPD and restrictive lung disease both noted in history. Frequent coarse cough on admit. CXR shows infiltrate vs atelectasis in left lower lobe. Labs unremarkable. Suspect this is COPD exacerbation.   duonebs Q4, while awake  order PRN albuterol  On 40mg PO prednisone  Started ceftriaxone and azithromycin on admission, but stopped as  procalcitonin <0.05  Needing oxygen overnight    Generalized Weakness  Noted by assisted living facility. Likely related to illness  - PT/OT recommending return to [facility]     Possible metabolic encephalopathy with history of dementia  Unable to give clear medical history, thinks she lives in [facility], not oriented to date. At other times give clear history of events. Noted history of dementia on Aricept though son does not agree with diagnosis and states it has been ruled out. Last SLUMs on file 27/30. States the noted confusion above is not typical for his mom.  - treat infection  - monitor mental status  - continue home donepezil       Chronic Diastolic Congestive Heart Failure   Appears euvolemic on exam. Mild pulmonary vasculature on CXR. Current weight of 185 lb, which is lower than previous in October 192 lb. Last echocardiogram on 2/2019 demonstrated an EF of 55-60% and normal LV, normal RV, mild left atrial dilation, mild atrial root dilation and moderate diastolic dysfunction. Clinically appears more infectious so will treat for infection initially and consider diuresis if not improving or clinically indicated.  - Continue home furosemide daily   - Follow daily weights     Coronary Artery Disease  Noted in history though unclear diagnosis, no prior cardiac testing on file to verify or notes from cardiology.  - continue home ASA, statin     Chronic pain, right hip, low back  Recent pelvic fracture  Follows with pain medicine, managed on acetaminophen, Cymbalta, gabapentin, oxycodone, and tizanidine.   - continue home Oxycotin, gabapentin, duloxetine     Hypertension  Chronic. Blood pressures reviewed, mildly elevated.   - continue home lisinopril, furosemide, nifedipine     Hyperlipidemia  Chronic.  - continue home atorvastatin     History of CVA/TIA  S/P Carotid Endarterectomy 2017  Brain aneurysm s/p clipping  History of CVA 2017 s/p endarterectomy in 2017 Previous clipping of aneurysm.  - continue home  statin, aspirin     CKD  Creatinine 1.07, GFR 47. STable with baseline  - AM BMP      Chronic Normocytic Anemia  Hgb 10.6. Baseline recently 9-10. On iron.  - continue home iron     History of DVT  Noted in history though cannot find clear documentation of this. Do see notes indicating DVT prophylaxis following surgery as well as history of superficial thrombophlebitis.     Diet: Consistent Carbohydrate Diet 3049-1271 Calories: Moderate Consistent CHO (4-6 CHO units/meal)    DVT Prophylaxis: Pneumatic Compression Devices  Maldonado Catheter: in place, indication:    Code Status: DNR/DNI, discussed directly on admit    Plan- continue  prednisone and nebs. Unable to discharge due to over night oxygen need. Unclear if she would be able to go back to [facility] or might need tcu. Likely here 1-2 more days.

## 2019-12-28 NOTE — PLAN OF CARE
Discharge Planner OT   Patient plan for discharge: back to [facility], resume home care services  Current status: Pt seen bedside for ADLs, up in chair upon arrival. Initially delining OT, but then agreeable to participate in ADLs from chair. Pt completes oral cares, washing face, and combing hair with extra time and set up A. She is limited by increased weakness and SOA today. Per chart, discharge delayed d/t increased need for supplemental O2.   Barriers to return to prior living situation: medical status, SOA, weakness  Recommendations for discharge: back to [facility] when medically ready, resume home care services  Rationale for recommendations: Pt appears near baseline. Anticipate once she is medically stable, she will be safe to discharge back to [facility].        Entered by: Carmella Escalante 12/28/2019 1:09 PM

## 2019-12-28 NOTE — PROGRESS NOTES
Reason for Follow up: discharge planning    Anticipated discharge needs: Not ready for discharge today.  Spoke with patient and son, Ed.  Patient continues to want to return to her skilled nursing on discharge.  Discussed TCU today.  Patient hopeful she will be ok to return safely to her skilled nursing when stable.  Discussed delayed discharge with son, Ed via phone with patient's permission.      Next steps: CTS to follow    Discharge Planner   Discharge Plans in progress: skilled nursing w/ home care  Barriers to discharge plan: medical stability  Follow up plan: CTS to follow       Entered by: Janet March 12/28/2019 1:39 PM       ADRIANA ClementsN RN  Inpatient RN care coordinator  [facility] [phone]  [facility] [phone]

## 2019-12-29 NOTE — PLAN OF CARE
[facility] DISCHARGE NOTE    Patient discharged to assisted living at 11:30 AM via wheel chair. Accompanied by son and staff. Discharge instructions reviewed with patient, opportunity offered to ask questions. Prescriptions sent to patients preferred pharmacy. All belongings sent with patient.    Report called to Deidre Anderson RN

## 2019-12-29 NOTE — DISCHARGE SUMMARY
Admit Date:     12/26/2019   Discharge Date:           HISTORY OF PRESENT ILLNESS:  Jazmin Clifton is an 86-year-old female with multiple medical problems, including COPD, restrictive lung disease, congestive heart failure, and coronary artery disease.  The patient presented with a 4-day history of shortness of breath, mild fever, chills, coughing, and some nausea.      She does have some intermittent difficulties with memory.  She carries a diagnosis of Alzheimer's and is on Aricept, but her son states this is a missed diagnosis and that she does not have memory problems.  Upon admission, the patient was saying that she lived in [place].  She, however, lives in an assisted living facility in this area.      The patient was admitted and thought to have an exacerbation of COPD.  Ultimately, she was positive for RSV virus.  She was treated with prednisone.  She was initially started on ceftriaxone and azithromycin, but these were stopped when her procalcitonin came back at less than 0.05.  She seemed to have some acute metabolic encephalopathy related to her illness, which cleared during her hospitalization.  She appeared to be at her baseline at the end of the hospitalization.      She does have some chronic diastolic congestive heart failure.  Her last echo in 02/2019 showed an EF of 55% to 60%.  She was continued on her home Lasix dose.      She has had some chronic musculoskeletal pain and most recently has been on prednisone 5 mg a day for shoulder pain.  She is going to taper her steroids back down to 5 mg a day at the time of discharge.      Her chronic anemia, CKD, previous cerebrovascular disease, hyperlipidemia and hypertension, all remained stable during this hospitalization.      She had some initial mild oxygen need and this resolved.  She had significant weakness and this was much better on the day of discharge.  She felt better.  She had some slight wheeze with forced exhalation.  I felt that she could  discharge back to her assisted living apartment.      ASSESSMENT:   1.  Chronic obstructive pulmonary disease exacerbation secondary to acute respiratory syncytial virus infection.   2.  Generalized weakness related to above.   3.  Acute metabolic encephalopathy related to above.   4.  Chronic diastolic congestive heart failure.   5.  Coronary artery disease -- details unclear.   6.  Chronic pain including shoulder pain for which the patient has most recently been on prednisone 5 mg a day.   7.  Hypertension.   8.  Hyperlipidemia.   9.  History of cerebrovascular accident, transient ischemic attack, carotid endarterectomy and brain aneurysm clipping.   10.  Chronic kidney disease, stage III.   11.  Chronic normocytic anemia.   12.  History of deep venous thrombosis -- details unclear.      PLAN:  The patient is going to discharge with a nebulizer.  She can use DuoNeb 4 times a day until she is rechecked by a physician.  She should recheck with her physician in a week.  She is going to do a short taper of prednisone using 30 mg a day for 3 days, 20 for 3 days, 10 for 3 days, and then she will resume the 5 mg a day that she was on previously.  If she has problems prior to primary care followup, she would need to come back to the emergency room.     Discharge Medication List as of 12/29/2019 11:17 AM      START taking these medications    Details   benzonatate (TESSALON) 100 MG capsule Take 1 capsule (100 mg) by mouth 3 times daily as needed for cough, Disp-25 capsule, R-0, E-Prescribe      ipratropium - albuterol 0.5 mg/2.5 mg/3 mL (DUONEB) 0.5-2.5 (3) MG/3ML neb solution Take 1 vial (3 mLs) by nebulization 4 times daily for 14 days, Disp-168 mL, R-0, E-Prescribe         CONTINUE these medications which have CHANGED    Details   predniSONE (DELTASONE) 10 MG tablet Take 3 daily for 3 days, then 2 daily for 3 days, then 1 daily for 3 days--then restart the 5 mg tabs that you have been taking daily for shoulder pain.,  Disp-18 tablet, R-0, Local Print         CONTINUE these medications which have NOT CHANGED    Details   acetaminophen (TYLENOL) 500 MG tablet Take 1,000 mg by mouth 3 times daily, Historical      aspirin (ASA) 81 MG chewable tablet Take 1 tablet (81 mg) by mouth daily, Transitional      atorvastatin (LIPITOR) 40 MG tablet TAKE 1 TABLET BY MOUTH ONCE DAILY, Disp-30 tablet, R-98, E-Prescribe      BREO ELLIPTA 100-25 MCG/INH inhaler INHALE 1 PUFF BY MOUTH ONCE DAILY, Disp-1 Inhaler, R-11, E-Prescribe      DOK PLUS 50-8.6 MG tablet TAKE 1 TABLET BY MOUTH TWICE DAILY, Disp-60 tablet, R-98, E-Prescribe      DONEPEZIL HCL PO Take 5 mg by mouth At Bedtime , Historical      !! DULoxetine (CYMBALTA) 30 MG capsule Take 1 capsule (30 mg) by mouth At Bedtime, Transitional      !! DULoxetine (CYMBALTA) 60 MG capsule Take 1 capsule (60 mg) by mouth every morning, Transitional      Emollient (MOISTURIZING LOTION EX) Externally apply topically 2 times daily Bilateral lower extremeties, Historical      FEROSUL 325 (65 Fe) MG tablet TAKE 1 TABLET BY MOUTH ONCE DAILY, Disp-30 tablet, R-98, E-Prescribe      fluticasone (FLONASE) 50 MCG/ACT nasal spray Spray 1 spray into both nostrils daily, Transitional      furosemide (LASIX) 20 MG tablet TAKE 1 TABLET BY MOUTH ONCE DAILY, Disp-30 tablet, R-98, E-Prescribe      gabapentin (NEURONTIN) 300 MG capsule TAKE 1 CAPSULE BY MOUTH TWICE DAILY, Disp-60 capsule, R-98, E-Prescribe      levalbuterol (XOPENEX) 1.25 MG/3ML neb solution Take 1 ampule by nebulization every 4 hours as needed for shortness of breath / dyspnea or wheezing And every 4 hours PRN, Historical      Lidocaine (LIDOCARE) 4 % Patch Place 1 patch onto the skin daily To desired area (shoulder or hip). On for 12hrs, off for 12hrsDisp-30 patch, [id number]      lisinopril (PRINIVIL/ZESTRIL) 10 MG tablet TAKE 1 TABLET BY MOUTH EVERY MORNING, Disp-30 tablet, R-98, E-Prescribe      NIFEdipine ER (ADALAT CC) 60 MG 24 hr tablet Take  60 mg by mouth daily , Historical      OMEPRAZOLE PO Take 40 mg by mouth every morning, Historical      oxyCODONE (OXYCONTIN) 10 MG 12 hr tablet Take 1 tablet (10 mg) by mouth every 12 hours, Disp-30 tablet, R-0, E-Prescribe      VITAMIN D3 1000 units tablet TAKE 1 TABLET BY MOUTH ONCE DAILY, Disp-31 tablet, R-98, E-Prescribe      Blood Glucose Monitoring Suppl CORY 2 times daily Call nurse for further directions if blood glucose is less than 80 or greater than 250, Historical      calcium carbonate (TUMS) 500 MG chewable tablet Take 1 tablet (500 mg) by mouth 3 times daily, Transitional      tiZANidine (ZANAFLEX) 2 MG tablet Take 2 mg by mouth every 8 hours as needed , Historical       !! - Potential duplicate medications found. Please discuss with provider.        Unresulted Labs Ordered in the Past 30 Days of this Admission     No orders found from 2019 to 2019.              TOTAL TIME SPENT:  Greater than 30 minutes spent on this.         VINNY SIDDIQUI MD             D: 2019   T: 2019   MT: AYANA      Name:     VICKIE REID   MRN:      [id number]        Account:        [id number]   :      1932           Admit Date:     2019                                  Discharge Date:       Document: [id number]       cc: Junie HERNANDEZ, CNP

## 2019-12-29 NOTE — PROGRESS NOTES
[facility] Hospitalist Service      Subjective:  Feels better  Less difficulty breathing  Less cough    Review of Systems:  CONSTITUTIONAL: NEGATIVE for fever, chills, change in weight  ENT/MOUTH: NEGATIVE for ear, mouth and throat problems  RESP:above  CV: NEGATIVE for chest pain, palpitations or peripheral edema    Physical Exam:  Vitals Were Reviewed    Patient Vitals for the past 16 hrs:   BP Temp Temp src Pulse Heart Rate Resp SpO2   12/29/19 0804 (!) 144/65 98  F (36.7  C) Oral 73 -- 18 95 %   12/29/19 0246 (!) 141/56 98.1  F (36.7  C) Oral -- 75 18 92 %   12/28/19 2256 (!) 145/56 98.4  F (36.9  C) Oral -- 79 18 90 %   12/28/19 1949 (!) 144/63 98.7  F (37.1  C) Oral -- 82 18 90 %         Intake/Output Summary (Last 24 hours) at 12/29/2019 0920  Last data filed at 12/28/2019 1745  Gross per 24 hour   Intake 420 ml   Output --   Net 420 ml       GENERAL APPEARANCE: healthy, alert and no distress  EYES: conjunctiva clear, eyes grossly normal  RESP: some exp wheeze with forced exhalation, no distress  CV: regular rate and rhythm, normal S1 S2, no S3 or S4 and no murmur, click or rub   ABDOMEN: soft, nontender, no HSM or masses and bowel sounds normal  MS: no clubbing, cyanosis; no edema  SKIN: clear without significant rashes or lesions    Lab:  Recent Labs   Lab Test 12/29/19  0712 12/27/19  0532    143   POTASSIUM 4.2 4.0   CHLORIDE 110* 109   CO2 31 30   ANIONGAP 2* 4   GLC 95 134*   BUN 33* 24   CR 1.04 0.89   BLAIR 9.5 9.2     CBC RESULTS:   Recent Labs   Lab Test 12/29/19  0712 12/28/19  0608   WBC 6.4 6.8   RBC 3.48* 3.34*   HGB 9.6* 9.4*   HCT 32.2* 30.9*    196       Results for orders placed or performed during the hospital encounter of 12/26/19 (from the past 24 hour(s))   CBC with platelets   Result Value Ref Range    WBC 6.4 4.0 - 11.0 10e9/L    RBC Count 3.48 (L) 3.8 - 5.2 10e12/L    Hemoglobin 9.6 (L) 11.7 - 15.7 g/dL    Hematocrit 32.2 (L) 35.0 - 47.0 %    MCV 93 78 - 100 fl    MCH  27.6 26.5 - 33.0 pg    MCHC 29.8 (L) 31.5 - 36.5 g/dL    RDW 18.6 (H) 10.0 - 15.0 %    Platelet Count 211 150 - 450 10e9/L   Basic metabolic panel   Result Value Ref Range    Sodium 143 133 - 144 mmol/L    Potassium 4.2 3.4 - 5.3 mmol/L    Chloride 110 (H) 94 - 109 mmol/L    Carbon Dioxide 31 20 - 32 mmol/L    Anion Gap 2 (L) 3 - 14 mmol/L    Glucose 95 70 - 99 mg/dL    Urea Nitrogen 33 (H) 7 - 30 mg/dL    Creatinine 1.04 0.52 - 1.04 mg/dL    GFR Estimate 48 (L) >60 mL/min/[1.73_m2]    GFR Estimate If Black 56 (L) >60 mL/min/[1.73_m2]    Calcium 9.5 8.5 - 10.1 mg/dL       Assessment and Plan:    Jazmin Clifton is a 86 year old female who was admitted on 12/26/2019 with cough and dyspnea.     COPD exacerbation secondary to acute RSV infection  ~1 week of cough, shortness of breath, mild fever/chills. 89 - 90% in ED. No PFTs on file, though COPD and restrictive lung disease both noted in history. Frequent coarse cough on admit. CXR shows infiltrate vs atelectasis in left lower lobe. Labs unremarkable. Suspect this is COPD exacerbation.   duonebs Q4, while awake  order PRN albuterol  On 40mg PO prednisone  Started ceftriaxone and azithromycin on admission, but stopped as procalcitonin <0.05  Currently on room air     Generalized Weakness  Noted by assisted living facility. Likely related to illness     Possible metabolic encephalopathy with history of dementia  Unable to give clear medical history, thinks she lives in [facility], not oriented to date. At other times give clear history of events. Noted history of dementia on Aricept though son does not agree with diagnosis and states it has been ruled out. Last SLUMs on file 27/30. States the noted confusion above is not typical for his mom.  - continue home donepezil      Appears improved.     Chronic Diastolic Congestive Heart Failure   Appears euvolemic on exam. Mild pulmonary vasculature on CXR. Current weight of 185 lb, which is lower than previous in October 192  lb. Last echocardiogram on 2/2019 demonstrated an EF of 55-60% and normal LV, normal RV, mild left atrial dilation, mild atrial root dilation and moderate diastolic dysfunction. Clinically appears more infectious so will treat for infection initially and consider diuresis if not improving or clinically indicated.  - Continue home furosemide daily   - Follow daily weights     Coronary Artery Disease  Noted in history though unclear diagnosis, no prior cardiac testing on file to verify or notes from cardiology.  - continue home ASA, statin     Chronic pain, right hip, low back  Recent pelvic fracture  Follows with pain medicine, managed on acetaminophen, Cymbalta, gabapentin, oxycodone, and tizanidine.   - continue home Oxycotin, gabapentin, duloxetine    Pt on prednisone 5 mg daily for chronic shoulder pain.     Hypertension  Chronic. Blood pressures reviewed, mildly elevated.   - continue home lisinopril, furosemide, nifedipine     Hyperlipidemia  Chronic.  - continue home atorvastatin     History of CVA/TIA  S/P Carotid Endarterectomy 2017  Brain aneurysm s/p clipping  History of CVA 2017 s/p endarterectomy in 2017 Previous clipping of aneurysm.  - continue home statin, aspirin     CKD  Creatinine 1.07, GFR 47. STable with baseline  - AM BMP      Chronic Normocytic Anemia  Hgb 10.6. Baseline recently 9-10. On iron.  - continue home iron     History of DVT  Noted in history though cannot find clear documentation of this. Do see notes indicating DVT prophylaxis following surgery as well as history of superficial thrombophlebitis.     Diet: Consistent Carbohydrate Diet 4373-3141 Calories: Moderate Consistent CHO (4-6 CHO units/meal)    DVT Prophylaxis: Pneumatic Compression Devices  [place]onado Catheter: in place, indication:    Code Status: DNR/DNI, discussed directly on admit     Plan- discharge

## 2019-12-29 NOTE — PROGRESS NOTES
PRIMARY CARE CLINIC FOLLOW UP VISIT  Chief Complaint   Patient presents with   • ADHD     lisdexamfetamine (VYVANSE)       History of Present Illness     Adult ADHD (attention deficit hyperactivity disorder)  She has been on vyanse 70 mg and the other primary care providers have told her that they will not fill this medication for her. Her prior PCP Dr. Tovar was filling this medication.       Current Outpatient Prescriptions   Medication Sig Dispense Refill   • [START ON 5/11/2019] lisdexamfetamine (VYVANSE) 70 MG capsule Take 1 Cap by mouth every morning for 30 days. 30 Cap 0   • [START ON 6/11/2019] lisdexamfetamine (VYVANSE) 70 MG capsule Take 1 Cap by mouth every morning for 30 days. 30 Cap 0   • [START ON 7/11/2019] lisdexamfetamine (VYVANSE) 70 MG capsule Take 1 Cap by mouth every morning for 30 days. 30 Cap 0   • doxycycline (VIBRAMYCIN) 100 MG Tab Take 1 Tab by mouth 2 times a day for 10 days. 20 Tab 0   • metFORMIN ER (GLUCOPHAGE XR) 500 MG TABLET SR 24 HR Take 500 mg by mouth every day.       No current facility-administered medications for this visit.      Past Medical History:   Diagnosis Date   • ADHD    • Cardiac murmur 3/27/2019   • Celiac disease    • Celiac disease 6/22/2018    Diagnosed by GI in [place] by official testing and endoscopy. Doing well with gluten free diet.   • Diabetes (HCC)    • GERD (gastroesophageal reflux disease)    • History of insulin resistance 2012   • Mild persistent asthma without complication 6/22/2018    Exercise induced asthma. Only with strenuous exercise. PRN Albuterol inhaler.    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 Needs new CXR for her employer.    • Urinary tract infection    • Vitamin D deficiency 6/22/2018    Taking OTC.      Past Surgical History:   Procedure Laterality Date   • HYSTERECTOMY, VAGINAL  2010    heavy menses   • PB ENLARGE BREAST WITH IMPLANT  2009   • PB REDUCTION OF LARGE BREAST  2009  Patient is still weak and shortenss of air with movement. Lung sounds exp whz. Mild cough. Patient sat in chair for dinner and up to the bathroom and voiding good amounts. Patient uses her call light bed alarm is on.     "  • APPENDECTOMY     • ABDOMINAL HYSTERECTOMY TOTAL     • LUMPECTOMY      breast implants     Social History   Substance Use Topics   • Smoking status: Never Smoker   • Smokeless tobacco: Never Used   • Alcohol use Yes      Comment: Rarely     Social History     Social History Narrative   • No narrative on file     Family History   Problem Relation Age of Onset   • Other Mother         DVTs, IVC filter in place   • Diabetes Father    • Other Father         gout   • No Known Problems Sister    • Diabetes Brother    • Cancer Maternal Grandmother         skin cancer   • Breast Cancer Maternal Aunt    • Cancer Maternal Aunt         throat   • Heart Disease Paternal Grandfather         heart attack     Family Status   Relation Status   • Mo Alive, age 60y   • Fa Alive, age 60y   • Sis Alive   • Bro Alive   • MGMo    • MAunt    • PGFa  at age 65     Allergies: Gluten meal; Tylenol; and Ibuprofen    ROS  As per HPI above. All other systems reviewed and negative.        Objective   /74   Pulse 90   Temp 36.9 °C (98.4 °F)   Resp 16   Ht 1.702 m (5' 7\")   Wt 67.1 kg (148 lb)   SpO2 99%  Body mass index is 23.18 kg/m².    General: alert and oriented, pleasant, cooperative  HEENT: Normocephalic, atraumatic.   Psychiatric: appropriate mood and affect. Good insight and appropriate judgment       Assessment and Plan   The following treatment plan was discussed     1. Adult ADHD (attention deficit hyperactivity disorder)   reviewed with consistent reported use.   - lisdexamfetamine (VYVANSE) 70 MG capsule; Take 1 Cap by mouth every morning for 30 days.  Dispense: 30 Cap; Refill: 0  - lisdexamfetamine (VYVANSE) 70 MG capsule; Take 1 Cap by mouth every morning for 30 days.  Dispense: 30 Cap; Refill: 0  - lisdexamfetamine (VYVANSE) 70 MG capsule; Take 1 Cap by mouth every morning for 30 days.  Dispense: 30 Cap; Refill: 0      Healthcare maintenance     Health Maintenance Due   Topic Date Due "   • DIABETES MONOFILAMENT / LE EXAM  04/20/1973   • RETINAL SCREENING  10/20/1990   • URINE ACR / MICROALBUMIN  10/20/1990   • IMM HEP B VACCINE (1 of 3 - Risk 3-dose series) 10/20/1991   • IMM PNEUMOCOCCAL 19-64 (ADULT) MEDIUM RISK SERIES (1 of 1 - PPSV23) 10/20/1991       No Follow-up on file.    Jared Brower MD  Internal Medicine  [facility]

## 2019-12-29 NOTE — PROGRESS NOTES
Name: Jazmin Clifton    MRN#: [id number]    Reason for Hospitalization: Hypoxia [R09.02]  COPD exacerbation (H) [J44.1]  Pneumonia of left lower lobe due to infectious organism (H) [J18.1]    Discharge Date: 12/29/2019    Patient / Family response to discharge plan: Confirmed plans for patient to return to [facility] (phone: [phone] Fax: [phone]) [facility] today.  She will resume [facility] ([phone] Fax: [phone]) PT/OT.  Spoke with son, Ed via phone.  He will plan to transport patient today at 1130.  Medications will be filled at [facility] pharmacy and sent with patient at discharge.      Other Providers (Care Coordinator, County Services, PCA services etc): No    CTS Hand Off Completed: Yes: completed to Mirian Weldon    PAS #: NA    LINN Score: elevated    Future Appointments: No future appointments.    Discharge Disposition: assisted living    Discharge Planner   Discharge Plans in progress: completed: return to [facility] w/ resumption of home care  Barriers to discharge plan: none  Follow up plan: pcp, home care, [facility] staff       Entered by: Janet March 12/29/2019 10:23 AM         ADIRANA ClementsN RN  Inpatient RN care coordinator  [facility] [phone]  [facility] [phone]

## 2019-12-29 NOTE — PLAN OF CARE
Physical Therapy Discharge Summary    Reason for therapy discharge:    Discharged to home with home therapy.    Progress towards therapy goal(s). See goals on Care Plan in [facility] electronic health record for goal details.  Goals met    Therapy recommendation(s):    Continued therapy is recommended.  Rationale/Recommendations:  home care.

## 2019-12-29 NOTE — PLAN OF CARE
Occupational Therapy Discharge Summary    Reason for therapy discharge:    Discharged to home with home therapy.    Progress towards therapy goal(s). See goals on Care Plan in [facility] electronic health record for goal details.  Goals met    Therapy recommendation(s):    Continued therapy is recommended.  Rationale/Recommendations:  Pt will benefit from home OT services to maximize safety and ind with ADLs/IADLs and mobility.

## 2019-12-31 NOTE — PROGRESS NOTES
12-31-19   CC was notified via Epic that member is was [facility] to the [facility] on 12-29-19   CC emailed RN team at [facility] asking them to reach out if there were any concerns or questions.   Mirian Weldon MA [facility] Coordinator   [phone]

## 2020-01-01 ENCOUNTER — PATIENT OUTREACH (OUTPATIENT)
Dept: GERIATRIC MEDICINE | Facility: CLINIC | Age: 85
End: 2020-01-01

## 2020-01-01 ENCOUNTER — VIRTUAL VISIT (OUTPATIENT)
Dept: GERIATRICS | Facility: CLINIC | Age: 85
End: 2020-01-01
Payer: COMMERCIAL

## 2020-01-01 ENCOUNTER — ANTICOAGULATION THERAPY VISIT (OUTPATIENT)
Dept: GERIATRICS | Facility: CLINIC | Age: 85
End: 2020-01-01

## 2020-01-01 ENCOUNTER — TELEPHONE (OUTPATIENT)
Dept: GERIATRICS | Facility: CLINIC | Age: 85
End: 2020-01-01

## 2020-01-01 ENCOUNTER — ALLIED HEALTH/NURSE VISIT (OUTPATIENT)
Dept: PHARMACY | Facility: CLINIC | Age: 85
End: 2020-01-01
Payer: COMMERCIAL

## 2020-01-01 ENCOUNTER — HOSPITAL LABORATORY (OUTPATIENT)
Facility: OTHER | Age: 85
End: 2020-01-01

## 2020-01-01 ENCOUNTER — APPOINTMENT (OUTPATIENT)
Dept: OCCUPATIONAL THERAPY | Facility: CLINIC | Age: 85
DRG: 683 | End: 2020-01-01
Payer: COMMERCIAL

## 2020-01-01 ENCOUNTER — APPOINTMENT (OUTPATIENT)
Dept: CT IMAGING | Facility: CLINIC | Age: 85
DRG: 683 | End: 2020-01-01
Attending: EMERGENCY MEDICINE
Payer: COMMERCIAL

## 2020-01-01 ENCOUNTER — APPOINTMENT (OUTPATIENT)
Dept: GENERAL RADIOLOGY | Facility: CLINIC | Age: 85
End: 2020-01-01
Attending: FAMILY MEDICINE
Payer: COMMERCIAL

## 2020-01-01 ENCOUNTER — ASSISTED LIVING VISIT (OUTPATIENT)
Dept: GERIATRICS | Facility: CLINIC | Age: 85
End: 2020-01-01
Payer: COMMERCIAL

## 2020-01-01 ENCOUNTER — OFFICE VISIT (OUTPATIENT)
Dept: PHARMACY | Facility: CLINIC | Age: 85
End: 2020-01-01
Payer: COMMERCIAL

## 2020-01-01 ENCOUNTER — DOCUMENTATION ONLY (OUTPATIENT)
Dept: GERIATRICS | Facility: CLINIC | Age: 85
End: 2020-01-01

## 2020-01-01 ENCOUNTER — NURSING HOME VISIT (OUTPATIENT)
Dept: GERIATRICS | Facility: CLINIC | Age: 85
End: 2020-01-01
Payer: COMMERCIAL

## 2020-01-01 ENCOUNTER — APPOINTMENT (OUTPATIENT)
Dept: GENERAL RADIOLOGY | Facility: CLINIC | Age: 85
DRG: 683 | End: 2020-01-01
Attending: EMERGENCY MEDICINE
Payer: COMMERCIAL

## 2020-01-01 ENCOUNTER — HOSPITAL ENCOUNTER (INPATIENT)
Facility: CLINIC | Age: 85
LOS: 2 days | Discharge: HOME HEALTH | DRG: 191 | End: 2020-03-11
Attending: EMERGENCY MEDICINE | Admitting: FAMILY MEDICINE
Payer: COMMERCIAL

## 2020-01-01 ENCOUNTER — APPOINTMENT (OUTPATIENT)
Dept: PHYSICAL THERAPY | Facility: CLINIC | Age: 85
DRG: 683 | End: 2020-01-01
Payer: COMMERCIAL

## 2020-01-01 ENCOUNTER — HOSPITAL ENCOUNTER (INPATIENT)
Facility: CLINIC | Age: 85
LOS: 3 days | Discharge: NURSING, REHAB OR LONG-TERM CARE | DRG: 683 | End: 2020-06-05
Attending: EMERGENCY MEDICINE | Admitting: INTERNAL MEDICINE
Payer: COMMERCIAL

## 2020-01-01 ENCOUNTER — APPOINTMENT (OUTPATIENT)
Dept: LAB | Facility: CLINIC | Age: 85
End: 2020-01-01
Attending: NURSE PRACTITIONER
Payer: COMMERCIAL

## 2020-01-01 ENCOUNTER — ALLIED HEALTH/NURSE VISIT (OUTPATIENT)
Dept: GERIATRICS | Facility: CLINIC | Age: 85
End: 2020-01-01
Payer: COMMERCIAL

## 2020-01-01 ENCOUNTER — HOSPITAL ENCOUNTER (EMERGENCY)
Facility: CLINIC | Age: 85
Discharge: NURSING, REHAB OR LONG-TERM CARE | End: 2020-07-16
Attending: FAMILY MEDICINE | Admitting: FAMILY MEDICINE
Payer: COMMERCIAL

## 2020-01-01 ENCOUNTER — APPOINTMENT (OUTPATIENT)
Dept: CT IMAGING | Facility: CLINIC | Age: 85
DRG: 191 | End: 2020-01-01
Attending: EMERGENCY MEDICINE
Payer: COMMERCIAL

## 2020-01-01 ENCOUNTER — APPOINTMENT (OUTPATIENT)
Dept: GENERAL RADIOLOGY | Facility: CLINIC | Age: 85
DRG: 191 | End: 2020-01-01
Attending: EMERGENCY MEDICINE
Payer: COMMERCIAL

## 2020-01-01 ENCOUNTER — AMBULATORY - HEALTHEAST (OUTPATIENT)
Dept: OTHER | Facility: CLINIC | Age: 85
End: 2020-01-01

## 2020-01-01 ENCOUNTER — DOCUMENTATION ONLY (OUTPATIENT)
Dept: OTHER | Facility: CLINIC | Age: 85
End: 2020-01-01

## 2020-01-01 ENCOUNTER — APPOINTMENT (OUTPATIENT)
Dept: PHYSICAL THERAPY | Facility: CLINIC | Age: 85
DRG: 191 | End: 2020-01-01
Payer: COMMERCIAL

## 2020-01-01 VITALS
RESPIRATION RATE: 20 BRPM | OXYGEN SATURATION: 91 % | WEIGHT: 167 LBS | HEART RATE: 80 BPM | SYSTOLIC BLOOD PRESSURE: 157 MMHG | BODY MASS INDEX: 29.58 KG/M2 | TEMPERATURE: 97.3 F | DIASTOLIC BLOOD PRESSURE: 82 MMHG

## 2020-01-01 VITALS
RESPIRATION RATE: 18 BRPM | SYSTOLIC BLOOD PRESSURE: 146 MMHG | WEIGHT: 175.8 LBS | TEMPERATURE: 98.4 F | DIASTOLIC BLOOD PRESSURE: 77 MMHG | BODY MASS INDEX: 31.14 KG/M2 | OXYGEN SATURATION: 93 % | HEART RATE: 81 BPM

## 2020-01-01 VITALS
OXYGEN SATURATION: 93 % | TEMPERATURE: 97.9 F | RESPIRATION RATE: 16 BRPM | HEART RATE: 75 BPM | DIASTOLIC BLOOD PRESSURE: 78 MMHG | BODY MASS INDEX: 30.34 KG/M2 | SYSTOLIC BLOOD PRESSURE: 134 MMHG | WEIGHT: 188 LBS

## 2020-01-01 VITALS
SYSTOLIC BLOOD PRESSURE: 163 MMHG | OXYGEN SATURATION: 96 % | HEART RATE: 76 BPM | RESPIRATION RATE: 20 BRPM | DIASTOLIC BLOOD PRESSURE: 64 MMHG | TEMPERATURE: 98.1 F | BODY MASS INDEX: 32.81 KG/M2 | HEIGHT: 63 IN | WEIGHT: 185.19 LBS

## 2020-01-01 VITALS
BODY MASS INDEX: 31.18 KG/M2 | SYSTOLIC BLOOD PRESSURE: 132 MMHG | RESPIRATION RATE: 28 BRPM | HEART RATE: 76 BPM | DIASTOLIC BLOOD PRESSURE: 78 MMHG | WEIGHT: 176 LBS | OXYGEN SATURATION: 95 % | TEMPERATURE: 97.6 F

## 2020-01-01 VITALS
HEART RATE: 73 BPM | DIASTOLIC BLOOD PRESSURE: 93 MMHG | WEIGHT: 175 LBS | BODY MASS INDEX: 31 KG/M2 | TEMPERATURE: 98 F | SYSTOLIC BLOOD PRESSURE: 179 MMHG | RESPIRATION RATE: 18 BRPM

## 2020-01-01 VITALS
HEIGHT: 63 IN | OXYGEN SATURATION: 96 % | WEIGHT: 188.4 LBS | DIASTOLIC BLOOD PRESSURE: 81 MMHG | HEART RATE: 75 BPM | TEMPERATURE: 98.3 F | RESPIRATION RATE: 20 BRPM | BODY MASS INDEX: 33.38 KG/M2 | SYSTOLIC BLOOD PRESSURE: 137 MMHG

## 2020-01-01 VITALS
HEART RATE: 68 BPM | RESPIRATION RATE: 20 BRPM | OXYGEN SATURATION: 89 % | SYSTOLIC BLOOD PRESSURE: 163 MMHG | TEMPERATURE: 97.1 F | BODY MASS INDEX: 30.64 KG/M2 | HEIGHT: 63 IN | DIASTOLIC BLOOD PRESSURE: 100 MMHG | WEIGHT: 172.9 LBS

## 2020-01-01 VITALS
BODY MASS INDEX: 30.12 KG/M2 | RESPIRATION RATE: 12 BRPM | OXYGEN SATURATION: 92 % | HEIGHT: 63 IN | TEMPERATURE: 97.5 F | SYSTOLIC BLOOD PRESSURE: 122 MMHG | WEIGHT: 170 LBS | HEART RATE: 71 BPM | DIASTOLIC BLOOD PRESSURE: 56 MMHG

## 2020-01-01 VITALS
TEMPERATURE: 98 F | DIASTOLIC BLOOD PRESSURE: 97 MMHG | HEART RATE: 89 BPM | OXYGEN SATURATION: 97 % | BODY MASS INDEX: 32.74 KG/M2 | SYSTOLIC BLOOD PRESSURE: 181 MMHG | WEIGHT: 184.8 LBS | HEIGHT: 63 IN | RESPIRATION RATE: 22 BRPM

## 2020-01-01 VITALS
SYSTOLIC BLOOD PRESSURE: 131 MMHG | WEIGHT: 168 LBS | OXYGEN SATURATION: 93 % | BODY MASS INDEX: 29.77 KG/M2 | RESPIRATION RATE: 19 BRPM | HEART RATE: 68 BPM | HEIGHT: 63 IN | DIASTOLIC BLOOD PRESSURE: 78 MMHG | TEMPERATURE: 96.3 F

## 2020-01-01 VITALS
OXYGEN SATURATION: 94 % | RESPIRATION RATE: 18 BRPM | HEART RATE: 73 BPM | BODY MASS INDEX: 30.65 KG/M2 | TEMPERATURE: 97.3 F | WEIGHT: 173 LBS | DIASTOLIC BLOOD PRESSURE: 69 MMHG | SYSTOLIC BLOOD PRESSURE: 109 MMHG

## 2020-01-01 VITALS
OXYGEN SATURATION: 93 % | HEART RATE: 81 BPM | RESPIRATION RATE: 16 BRPM | TEMPERATURE: 97.7 F | BODY MASS INDEX: 32.89 KG/M2 | HEIGHT: 63 IN | WEIGHT: 185.63 LBS | SYSTOLIC BLOOD PRESSURE: 140 MMHG | DIASTOLIC BLOOD PRESSURE: 60 MMHG

## 2020-01-01 VITALS
SYSTOLIC BLOOD PRESSURE: 154 MMHG | HEART RATE: 90 BPM | TEMPERATURE: 98.1 F | RESPIRATION RATE: 25 BRPM | DIASTOLIC BLOOD PRESSURE: 81 MMHG | OXYGEN SATURATION: 93 % | BODY MASS INDEX: 31.07 KG/M2 | WEIGHT: 175.4 LBS

## 2020-01-01 DIAGNOSIS — Z79.01 LONG TERM (CURRENT) USE OF ANTICOAGULANTS: ICD-10-CM

## 2020-01-01 DIAGNOSIS — R11.2 NAUSEA AND VOMITING, INTRACTABILITY OF VOMITING NOT SPECIFIED, UNSPECIFIED VOMITING TYPE: ICD-10-CM

## 2020-01-01 DIAGNOSIS — I48.91 ATRIAL FIBRILLATION, UNSPECIFIED TYPE (H): ICD-10-CM

## 2020-01-01 DIAGNOSIS — Z86.718 HISTORY OF DVT (DEEP VEIN THROMBOSIS): ICD-10-CM

## 2020-01-01 DIAGNOSIS — I10 ESSENTIAL HYPERTENSION: ICD-10-CM

## 2020-01-01 DIAGNOSIS — F03.90 DEMENTIA WITHOUT BEHAVIORAL DISTURBANCE, UNSPECIFIED DEMENTIA TYPE: Primary | ICD-10-CM

## 2020-01-01 DIAGNOSIS — I48.0 PAROXYSMAL ATRIAL FIBRILLATION (H): ICD-10-CM

## 2020-01-01 DIAGNOSIS — E78.5 HYPERLIPIDEMIA, UNSPECIFIED HYPERLIPIDEMIA TYPE: ICD-10-CM

## 2020-01-01 DIAGNOSIS — M51.369 DDD (DEGENERATIVE DISC DISEASE), LUMBAR: ICD-10-CM

## 2020-01-01 DIAGNOSIS — N18.4 ANEMIA OF CHRONIC RENAL FAILURE, STAGE 4 (SEVERE) (H): ICD-10-CM

## 2020-01-01 DIAGNOSIS — R26.89 IMPAIRED GAIT AND MOBILITY: Primary | ICD-10-CM

## 2020-01-01 DIAGNOSIS — I50.32 CHRONIC DIASTOLIC CONGESTIVE HEART FAILURE (H): ICD-10-CM

## 2020-01-01 DIAGNOSIS — I25.10 CORONARY ARTERY DISEASE INVOLVING NATIVE CORONARY ARTERY OF NATIVE HEART WITHOUT ANGINA PECTORIS: ICD-10-CM

## 2020-01-01 DIAGNOSIS — R19.7 DIARRHEA, UNSPECIFIED TYPE: ICD-10-CM

## 2020-01-01 DIAGNOSIS — I63.9 CEREBROVASCULAR ACCIDENT (CVA), UNSPECIFIED MECHANISM (H): ICD-10-CM

## 2020-01-01 DIAGNOSIS — M81.0 AGE-RELATED OSTEOPOROSIS WITHOUT CURRENT PATHOLOGICAL FRACTURE: ICD-10-CM

## 2020-01-01 DIAGNOSIS — D50.9 IRON DEFICIENCY ANEMIA, UNSPECIFIED IRON DEFICIENCY ANEMIA TYPE: ICD-10-CM

## 2020-01-01 DIAGNOSIS — S32.592A CLOSED FRACTURE OF RAMUS OF LEFT PUBIS, INITIAL ENCOUNTER (H): ICD-10-CM

## 2020-01-01 DIAGNOSIS — G89.4 CHRONIC PAIN SYNDROME: ICD-10-CM

## 2020-01-01 DIAGNOSIS — G89.29 CHRONIC LEFT SHOULDER PAIN: ICD-10-CM

## 2020-01-01 DIAGNOSIS — N18.4 ANEMIA OF CHRONIC RENAL FAILURE, STAGE 4 (SEVERE) (H): Chronic | ICD-10-CM

## 2020-01-01 DIAGNOSIS — Z00.00 ANNUAL PHYSICAL EXAM: Primary | ICD-10-CM

## 2020-01-01 DIAGNOSIS — J44.9 CHRONIC OBSTRUCTIVE PULMONARY DISEASE, UNSPECIFIED COPD TYPE (H): Primary | ICD-10-CM

## 2020-01-01 DIAGNOSIS — I10 ESSENTIAL HYPERTENSION, BENIGN: ICD-10-CM

## 2020-01-01 DIAGNOSIS — L03.116 CELLULITIS OF LEFT LOWER EXTREMITY: Primary | ICD-10-CM

## 2020-01-01 DIAGNOSIS — D63.1 ANEMIA OF CHRONIC RENAL FAILURE, STAGE 4 (SEVERE) (H): ICD-10-CM

## 2020-01-01 DIAGNOSIS — S80.12XA CONTUSION OF LEFT LEG, INITIAL ENCOUNTER: ICD-10-CM

## 2020-01-01 DIAGNOSIS — R11.0 NAUSEA: Primary | ICD-10-CM

## 2020-01-01 DIAGNOSIS — F41.8 DEPRESSION WITH ANXIETY: ICD-10-CM

## 2020-01-01 DIAGNOSIS — M79.89 LEG SWELLING: ICD-10-CM

## 2020-01-01 DIAGNOSIS — K21.9 GASTROESOPHAGEAL REFLUX DISEASE WITHOUT ESOPHAGITIS: ICD-10-CM

## 2020-01-01 DIAGNOSIS — J44.9 CHRONIC OBSTRUCTIVE PULMONARY DISEASE, UNSPECIFIED COPD TYPE (H): ICD-10-CM

## 2020-01-01 DIAGNOSIS — I25.10 CORONARY ARTERY DISEASE INVOLVING NATIVE HEART WITHOUT ANGINA PECTORIS, UNSPECIFIED VESSEL OR LESION TYPE: ICD-10-CM

## 2020-01-01 DIAGNOSIS — R13.11 ORAL PHASE DYSPHAGIA: ICD-10-CM

## 2020-01-01 DIAGNOSIS — M25.512 CHRONIC LEFT SHOULDER PAIN: ICD-10-CM

## 2020-01-01 DIAGNOSIS — M79.89 SWELLING OF LIMB: ICD-10-CM

## 2020-01-01 DIAGNOSIS — R10.31 ABDOMINAL PAIN, RIGHT LOWER QUADRANT: Primary | ICD-10-CM

## 2020-01-01 DIAGNOSIS — E66.01 MORBID OBESITY (H): ICD-10-CM

## 2020-01-01 DIAGNOSIS — B33.8 RSV INFECTION: ICD-10-CM

## 2020-01-01 DIAGNOSIS — G89.4 PAIN SYNDROME, CHRONIC: ICD-10-CM

## 2020-01-01 DIAGNOSIS — R13.10 DYSPHAGIA, UNSPECIFIED TYPE: ICD-10-CM

## 2020-01-01 DIAGNOSIS — R11.0 NAUSEA: ICD-10-CM

## 2020-01-01 DIAGNOSIS — K21.9 GASTROESOPHAGEAL REFLUX DISEASE, ESOPHAGITIS PRESENCE NOT SPECIFIED: ICD-10-CM

## 2020-01-01 DIAGNOSIS — N18.30 CKD (CHRONIC KIDNEY DISEASE) STAGE 3, GFR 30-59 ML/MIN (H): ICD-10-CM

## 2020-01-01 DIAGNOSIS — J30.2 SEASONAL ALLERGIC RHINITIS, UNSPECIFIED TRIGGER: ICD-10-CM

## 2020-01-01 DIAGNOSIS — R41.0 INTERMITTENT CONFUSION: ICD-10-CM

## 2020-01-01 DIAGNOSIS — Z86.79 HISTORY OF ATRIAL FIBRILLATION: ICD-10-CM

## 2020-01-01 DIAGNOSIS — K52.9 GASTROENTERITIS: ICD-10-CM

## 2020-01-01 DIAGNOSIS — R52 PAIN: Primary | ICD-10-CM

## 2020-01-01 DIAGNOSIS — G31.84 MILD COGNITIVE IMPAIRMENT: ICD-10-CM

## 2020-01-01 DIAGNOSIS — K59.01 SLOW TRANSIT CONSTIPATION: ICD-10-CM

## 2020-01-01 DIAGNOSIS — I48.0 PAROXYSMAL ATRIAL FIBRILLATION (H): Primary | ICD-10-CM

## 2020-01-01 DIAGNOSIS — R93.89 ABNORMAL CT OF THE CHEST: ICD-10-CM

## 2020-01-01 DIAGNOSIS — Z92.29 HISTORY OF ANTICOAGULANT USE: ICD-10-CM

## 2020-01-01 DIAGNOSIS — E63.9 NUTRITIONAL DEFICIENCY: ICD-10-CM

## 2020-01-01 DIAGNOSIS — N18.32 STAGE 3B CHRONIC KIDNEY DISEASE (H): ICD-10-CM

## 2020-01-01 DIAGNOSIS — S81.812D SKIN TEAR OF LEFT LOWER LEG WITHOUT COMPLICATION, SUBSEQUENT ENCOUNTER: ICD-10-CM

## 2020-01-01 DIAGNOSIS — R29.6 RECURRENT FALLS: Primary | ICD-10-CM

## 2020-01-01 DIAGNOSIS — Z51.5 HOSPICE CARE PATIENT: ICD-10-CM

## 2020-01-01 DIAGNOSIS — I50.9 CONGESTIVE HEART FAILURE, UNSPECIFIED HF CHRONICITY, UNSPECIFIED HEART FAILURE TYPE (H): ICD-10-CM

## 2020-01-01 DIAGNOSIS — R11.10 VOMITING AND DIARRHEA: Primary | ICD-10-CM

## 2020-01-01 DIAGNOSIS — R07.9 CHEST PAIN, UNSPECIFIED TYPE: ICD-10-CM

## 2020-01-01 DIAGNOSIS — J44.1 COPD EXACERBATION (H): ICD-10-CM

## 2020-01-01 DIAGNOSIS — Z53.9 ERRONEOUS ENCOUNTER--DISREGARD: Primary | ICD-10-CM

## 2020-01-01 DIAGNOSIS — Z79.01 LONG TERM CURRENT USE OF ANTICOAGULANT THERAPY: ICD-10-CM
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
## 2020-01-01 DIAGNOSIS — D63.1 ANEMIA OF CHRONIC RENAL FAILURE, STAGE 4 (SEVERE) (H): Chronic | ICD-10-CM

## 2020-01-01 DIAGNOSIS — R19.7 DIARRHEA, UNSPECIFIED TYPE: Primary | ICD-10-CM

## 2020-01-01 DIAGNOSIS — L03.116 CELLULITIS OF LEFT LOWER EXTREMITY: ICD-10-CM

## 2020-01-01 DIAGNOSIS — E78.5 HYPERLIPIDEMIA LDL GOAL <70: ICD-10-CM

## 2020-01-01 DIAGNOSIS — R63.4 WEIGHT LOSS: ICD-10-CM

## 2020-01-01 DIAGNOSIS — R19.09 RIGHT GROIN MASS: ICD-10-CM

## 2020-01-01 DIAGNOSIS — F32.0 MILD MAJOR DEPRESSION (H): ICD-10-CM

## 2020-01-01 DIAGNOSIS — K13.79 PAIN IN ORAL CAVITY: ICD-10-CM

## 2020-01-01 DIAGNOSIS — Z86.73 HISTORY OF CVA (CEREBROVASCULAR ACCIDENT): ICD-10-CM

## 2020-01-01 DIAGNOSIS — F43.20 ANACLITIC DEPRESSION: ICD-10-CM

## 2020-01-01 DIAGNOSIS — R29.6 RECURRENT FALLS: ICD-10-CM

## 2020-01-01 DIAGNOSIS — R07.9 CHEST PAIN, UNSPECIFIED TYPE: Primary | ICD-10-CM

## 2020-01-01 DIAGNOSIS — R19.7 VOMITING AND DIARRHEA: Primary | ICD-10-CM

## 2020-01-01 DIAGNOSIS — I25.118 CORONARY ARTERY DISEASE OF NATIVE HEART WITH STABLE ANGINA PECTORIS, UNSPECIFIED VESSEL OR LESION TYPE (H): ICD-10-CM
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
## 2020-01-01 DIAGNOSIS — R19.5 LOOSE STOOLS: ICD-10-CM

## 2020-01-01 DIAGNOSIS — Z86.718 HISTORY OF DVT (DEEP VEIN THROMBOSIS): Chronic | ICD-10-CM

## 2020-01-01 DIAGNOSIS — I48.91 ATRIAL FIBRILLATION, UNSPECIFIED TYPE (H): Primary | ICD-10-CM

## 2020-01-01 DIAGNOSIS — G89.4 PAIN SYNDROME, CHRONIC: Primary | ICD-10-CM

## 2020-01-01 DIAGNOSIS — M81.0 OSTEOPOROSIS WITHOUT CURRENT PATHOLOGICAL FRACTURE, UNSPECIFIED OSTEOPOROSIS TYPE: ICD-10-CM

## 2020-01-01 DIAGNOSIS — L30.8 DERMATITIS ASSOCIATED WITH MOISTURE: ICD-10-CM

## 2020-01-01 DIAGNOSIS — J44.1 COPD EXACERBATION (H): Primary | ICD-10-CM

## 2020-01-01 DIAGNOSIS — I50.9 CONGESTIVE HEART FAILURE, UNSPECIFIED HF CHRONICITY, UNSPECIFIED HEART FAILURE TYPE (H): Primary | ICD-10-CM

## 2020-01-01 DIAGNOSIS — G89.4 CHRONIC PAIN SYNDROME: Primary | ICD-10-CM

## 2020-01-01 LAB
ABO + RH BLD: NORMAL
ABO + RH BLD: NORMAL
ALBUMIN SERPL-MCNC: 3 G/DL (ref 3.4–5)
ALBUMIN SERPL-MCNC: 3.2 G/DL (ref 3.4–5)
ALBUMIN SERPL-MCNC: 3.2 G/DL (ref 3.4–5)
ALBUMIN SERPL-MCNC: 3.3 G/DL (ref 3.4–5)
ALBUMIN UR-MCNC: NEGATIVE MG/DL
ALP SERPL-CCNC: 108 U/L (ref 40–150)
ALP SERPL-CCNC: 109 U/L (ref 40–150)
ALP SERPL-CCNC: 119 U/L (ref 40–150)
ALP SERPL-CCNC: 80 U/L (ref 40–150)
ALT SERPL W P-5'-P-CCNC: 12 U/L (ref 0–50)
ALT SERPL W P-5'-P-CCNC: 14 U/L (ref 0–50)
ALT SERPL W P-5'-P-CCNC: 15 U/L (ref 0–50)
ALT SERPL W P-5'-P-CCNC: 18 U/L (ref 0–50)
ANION GAP SERPL CALCULATED.3IONS-SCNC: 4 MMOL/L (ref 3–14)
ANION GAP SERPL CALCULATED.3IONS-SCNC: 4 MMOL/L (ref 3–14)
ANION GAP SERPL CALCULATED.3IONS-SCNC: 5 MMOL/L (ref 3–14)
ANION GAP SERPL CALCULATED.3IONS-SCNC: 6 MMOL/L (ref 3–14)
ANION GAP SERPL CALCULATED.3IONS-SCNC: 6 MMOL/L (ref 3–14)
ANION GAP SERPL CALCULATED.3IONS-SCNC: 7 MMOL/L (ref 3–14)
ANION GAP SERPL CALCULATED.3IONS-SCNC: 7 MMOL/L (ref 3–14)
APPEARANCE UR: CLEAR
AST SERPL W P-5'-P-CCNC: 10 U/L (ref 0–45)
AST SERPL W P-5'-P-CCNC: 12 U/L (ref 0–45)
AST SERPL W P-5'-P-CCNC: 12 U/L (ref 0–45)
AST SERPL W P-5'-P-CCNC: 15 U/L (ref 0–45)
BACTERIA #/AREA URNS HPF: ABNORMAL /HPF
BASOPHILS # BLD AUTO: 0 10E9/L (ref 0–0.2)
BASOPHILS NFR BLD AUTO: 0.3 %
BASOPHILS NFR BLD AUTO: 0.4 %
BILIRUB SERPL-MCNC: 0.3 MG/DL (ref 0.2–1.3)
BILIRUB SERPL-MCNC: 0.3 MG/DL (ref 0.2–1.3)
BILIRUB SERPL-MCNC: 0.4 MG/DL (ref 0.2–1.3)
BILIRUB SERPL-MCNC: 0.4 MG/DL (ref 0.2–1.3)
BILIRUB UR QL STRIP: NEGATIVE
BLD GP AB SCN SERPL QL: NORMAL
BLOOD BANK CMNT PATIENT-IMP: NORMAL
BUN SERPL-MCNC: 20 MG/DL (ref 7–30)
BUN SERPL-MCNC: 24 MG/DL (ref 7–30)
BUN SERPL-MCNC: 25 MG/DL (ref 7–30)
BUN SERPL-MCNC: 27 MG/DL (ref 7–30)
BUN SERPL-MCNC: 28 MG/DL (ref 7–30)
BUN SERPL-MCNC: 31 MG/DL (ref 7–30)
BUN SERPL-MCNC: 32 MG/DL (ref 7–30)
BUN SERPL-MCNC: 39 MG/DL (ref 7–30)
BUN SERPL-MCNC: 40 MG/DL (ref 7–30)
CALCIUM SERPL-MCNC: 8.8 MG/DL (ref 8.5–10.1)
CALCIUM SERPL-MCNC: 9.4 MG/DL (ref 8.5–10.1)
CALCIUM SERPL-MCNC: 9.4 MG/DL (ref 8.5–10.1)
CALCIUM SERPL-MCNC: 9.5 MG/DL (ref 8.5–10.1)
CALCIUM SERPL-MCNC: 9.6 MG/DL (ref 8.5–10.1)
CALCIUM SERPL-MCNC: 9.6 MG/DL (ref 8.5–10.1)
CALCIUM SERPL-MCNC: 9.8 MG/DL (ref 8.5–10.1)
CHLORIDE SERPL-SCNC: 105 MMOL/L (ref 94–109)
CHLORIDE SERPL-SCNC: 106 MMOL/L (ref 94–109)
CHLORIDE SERPL-SCNC: 109 MMOL/L (ref 94–109)
CHLORIDE SERPL-SCNC: 109 MMOL/L (ref 94–109)
CHLORIDE SERPL-SCNC: 110 MMOL/L (ref 94–109)
CHLORIDE SERPL-SCNC: 112 MMOL/L (ref 94–109)
CHLORIDE SERPL-SCNC: 113 MMOL/L (ref 94–109)
CO2 SERPL-SCNC: 27 MMOL/L (ref 20–32)
CO2 SERPL-SCNC: 28 MMOL/L (ref 20–32)
CO2 SERPL-SCNC: 28 MMOL/L (ref 20–32)
CO2 SERPL-SCNC: 29 MMOL/L (ref 20–32)
CO2 SERPL-SCNC: 30 MMOL/L (ref 20–32)
COLOR UR AUTO: YELLOW
CREAT SERPL-MCNC: 0.97 MG/DL (ref 0.52–1.04)
CREAT SERPL-MCNC: 1.05 MG/DL (ref 0.52–1.04)
CREAT SERPL-MCNC: 1.11 MG/DL (ref 0.52–1.04)
CREAT SERPL-MCNC: 1.13 MG/DL (ref 0.52–1.04)
CREAT SERPL-MCNC: 1.18 MG/DL (ref 0.52–1.04)
CREAT SERPL-MCNC: 1.18 MG/DL (ref 0.52–1.04)
CREAT SERPL-MCNC: 1.22 MG/DL (ref 0.52–1.04)
CREAT SERPL-MCNC: 1.3 MG/DL (ref 0.52–1.04)
CREAT SERPL-MCNC: 1.47 MG/DL (ref 0.52–1.04)
CRP SERPL-MCNC: 6.8 MG/L (ref 0–8)
DEPRECATED CALCIDIOL+CALCIFEROL SERPL-MC: 42 UG/L (ref 20–75)
DIFFERENTIAL METHOD BLD: ABNORMAL
EOSINOPHIL # BLD AUTO: 0 10E9/L (ref 0–0.7)
EOSINOPHIL # BLD AUTO: 0.1 10E9/L (ref 0–0.7)
EOSINOPHIL # BLD AUTO: 0.1 10E9/L (ref 0–0.7)
EOSINOPHIL # BLD AUTO: 0.2 10E9/L (ref 0–0.7)
EOSINOPHIL NFR BLD AUTO: 0.5 %
EOSINOPHIL NFR BLD AUTO: 0.8 %
EOSINOPHIL NFR BLD AUTO: 1 %
EOSINOPHIL NFR BLD AUTO: 1.7 %
ERYTHROCYTE [DISTWIDTH] IN BLOOD BY AUTOMATED COUNT: 13.8 % (ref 10–15)
ERYTHROCYTE [DISTWIDTH] IN BLOOD BY AUTOMATED COUNT: 14 % (ref 10–15)
ERYTHROCYTE [DISTWIDTH] IN BLOOD BY AUTOMATED COUNT: 14.4 % (ref 10–15)
ERYTHROCYTE [DISTWIDTH] IN BLOOD BY AUTOMATED COUNT: 14.4 % (ref 10–15)
ERYTHROCYTE [DISTWIDTH] IN BLOOD BY AUTOMATED COUNT: 14.6 % (ref 10–15)
ERYTHROCYTE [DISTWIDTH] IN BLOOD BY AUTOMATED COUNT: 14.8 % (ref 10–15)
ERYTHROCYTE [DISTWIDTH] IN BLOOD BY AUTOMATED COUNT: 15.9 % (ref 10–15)
ERYTHROCYTE [DISTWIDTH] IN BLOOD BY AUTOMATED COUNT: 15.9 % (ref 10–15)
ERYTHROCYTE [DISTWIDTH] IN BLOOD BY AUTOMATED COUNT: 16 % (ref 10–15)
FERRITIN SERPL-MCNC: 38 NG/ML (ref 8–252)
FLUAV+FLUBV AG SPEC QL: NEGATIVE
FLUAV+FLUBV AG SPEC QL: NEGATIVE
GFR SERPL CREATININE-BSD FRML MDRD: 32 ML/MIN/{1.73_M2}
GFR SERPL CREATININE-BSD FRML MDRD: 37 ML/MIN/{1.73_M2}
GFR SERPL CREATININE-BSD FRML MDRD: 40 ML/MIN/{1.73_M2}
GFR SERPL CREATININE-BSD FRML MDRD: 41 ML/MIN/{1.73_M2}
GFR SERPL CREATININE-BSD FRML MDRD: 41 ML/MIN/{1.73_M2}
GFR SERPL CREATININE-BSD FRML MDRD: 44 ML/MIN/{1.73_M2}
GFR SERPL CREATININE-BSD FRML MDRD: 44 ML/MIN/{1.73_M2}
GFR SERPL CREATININE-BSD FRML MDRD: 48 ML/MIN/{1.73_M2}
GFR SERPL CREATININE-BSD FRML MDRD: 53 ML/MIN/{1.73_M2}
GLUCOSE SERPL-MCNC: 101 MG/DL (ref 70–99)
GLUCOSE SERPL-MCNC: 102 MG/DL (ref 70–99)
GLUCOSE SERPL-MCNC: 114 MG/DL (ref 70–99)
GLUCOSE SERPL-MCNC: 122 MG/DL (ref 70–99)
GLUCOSE SERPL-MCNC: 130 MG/DL (ref 70–99)
GLUCOSE SERPL-MCNC: 136 MG/DL (ref 70–99)
GLUCOSE SERPL-MCNC: 139 MG/DL (ref 70–99)
GLUCOSE SERPL-MCNC: 95 MG/DL (ref 70–99)
GLUCOSE SERPL-MCNC: 96 MG/DL (ref 70–99)
GLUCOSE UR STRIP-MCNC: NEGATIVE MG/DL
HCT VFR BLD AUTO: 27 % (ref 35–47)
HCT VFR BLD AUTO: 30.2 % (ref 35–47)
HCT VFR BLD AUTO: 31.9 % (ref 35–47)
HCT VFR BLD AUTO: 32.7 % (ref 35–47)
HCT VFR BLD AUTO: 33 % (ref 35–47)
HCT VFR BLD AUTO: 35.9 % (ref 35–47)
HCT VFR BLD AUTO: 38.7 % (ref 35–47)
HCT VFR BLD AUTO: 39.1 % (ref 35–47)
HCT VFR BLD AUTO: 40.5 % (ref 35–47)
HGB BLD-MCNC: 10.1 G/DL (ref 11.7–15.7)
HGB BLD-MCNC: 10.3 G/DL (ref 11.7–15.7)
HGB BLD-MCNC: 11.8 G/DL (ref 11.7–15.7)
HGB BLD-MCNC: 11.9 G/DL (ref 11.7–15.7)
HGB BLD-MCNC: 12.3 G/DL (ref 11.7–15.7)
HGB BLD-MCNC: 7.9 G/DL (ref 11.7–15.7)
HGB BLD-MCNC: 8.7 G/DL (ref 11.7–15.7)
HGB BLD-MCNC: 8.8 G/DL (ref 11.7–15.7)
HGB BLD-MCNC: 9.4 G/DL (ref 11.7–15.7)
HGB BLD-MCNC: 9.8 G/DL (ref 11.7–15.7)
HGB UR QL STRIP: NEGATIVE
HYALINE CASTS #/AREA URNS LPF: 20 /LPF (ref 0–2)
IMM GRANULOCYTES # BLD: 0 10E9/L (ref 0–0.4)
IMM GRANULOCYTES # BLD: 0 10E9/L (ref 0–0.4)
IMM GRANULOCYTES # BLD: 0.1 10E9/L (ref 0–0.4)
IMM GRANULOCYTES # BLD: 0.1 10E9/L (ref 0–0.4)
IMM GRANULOCYTES NFR BLD: 0.4 %
IMM GRANULOCYTES NFR BLD: 0.4 %
IMM GRANULOCYTES NFR BLD: 0.5 %
IMM GRANULOCYTES NFR BLD: 0.6 %
INR PPP: 1.1 (ref 0.9–1.1)
INR PPP: 1.13 (ref 0.86–1.14)
INR PPP: 1.2 (ref 0.86–1.14)
INR PPP: 1.7 (ref 0.86–1.14)
INR PPP: 1.8 (ref 0.9–1.1)
INR PPP: 1.89 (ref 0.86–1.14)
INR PPP: 2 (ref 0.9–1.1)
INR PPP: 2.1 (ref 0.9–1.1)
INR PPP: 2.17 (ref 0.86–1.14)
INR PPP: 2.18 (ref 0.86–1.14)
INR PPP: 2.2 (ref 0.9–1.1)
INR PPP: 2.2 (ref 0.9–1.1)
INR PPP: 2.23 (ref 0.86–1.14)
INR PPP: 2.3 (ref 0.9–1.1)
INR PPP: 2.3 (ref 0.9–1.1)
INR PPP: 2.4 (ref 0.9–1.1)
INR PPP: 2.4 (ref 0.9–1.1)
INR PPP: 2.5 (ref 0.9–1.1)
INR PPP: 2.6 (ref 0.9–1.1)
INR PPP: 2.6 (ref 0.9–1.1)
INR PPP: 2.66 (ref 0.86–1.14)
INR PPP: 2.7 (ref 0.9–1.1)
INR PPP: 2.7 (ref 0.9–1.1)
INR PPP: 2.9 (ref 0.9–1.1)
INR PPP: 3 (ref 0.9–1.1)
INR PPP: 3 (ref 0.9–1.1)
INR PPP: 3.2 (ref 0.9–1.1)
INR PPP: 3.2 (ref 0.9–1.1)
INR PPP: 3.3 (ref 0.9–1.1)
INR PPP: 3.4 (ref 0.9–1.1)
INR PPP: 3.5 (ref 0.9–1.1)
INR PPP: 3.8 (ref 0.9–1.1)
INR PPP: 4.2 (ref 0.9–1.1)
INR PPP: 5.06 (ref 0.86–1.14)
INR PPP: 5.8 (ref 0.9–1.1)
KETONES UR STRIP-MCNC: NEGATIVE MG/DL
LACTATE BLD-SCNC: 1.3 MMOL/L (ref 0.7–2)
LEUKOCYTE ESTERASE UR QL STRIP: NEGATIVE
LIPASE SERPL-CCNC: 52 U/L (ref 73–393)
LYMPHOCYTES # BLD AUTO: 0.7 10E9/L (ref 0.8–5.3)
LYMPHOCYTES # BLD AUTO: 1.2 10E9/L (ref 0.8–5.3)
LYMPHOCYTES # BLD AUTO: 1.5 10E9/L (ref 0.8–5.3)
LYMPHOCYTES # BLD AUTO: 2.4 10E9/L (ref 0.8–5.3)
LYMPHOCYTES NFR BLD AUTO: 15.3 %
LYMPHOCYTES NFR BLD AUTO: 18.7 %
LYMPHOCYTES NFR BLD AUTO: 24.8 %
LYMPHOCYTES NFR BLD AUTO: 8.3 %
MCH RBC QN AUTO: 25.6 PG (ref 26.5–33)
MCH RBC QN AUTO: 25.8 PG (ref 26.5–33)
MCH RBC QN AUTO: 25.9 PG (ref 26.5–33)
MCH RBC QN AUTO: 26.2 PG (ref 26.5–33)
MCH RBC QN AUTO: 27.9 PG (ref 26.5–33)
MCH RBC QN AUTO: 28.1 PG (ref 26.5–33)
MCH RBC QN AUTO: 28.3 PG (ref 26.5–33)
MCH RBC QN AUTO: 28.3 PG (ref 26.5–33)
MCH RBC QN AUTO: 28.8 PG (ref 26.5–33)
MCHC RBC AUTO-ENTMCNC: 28.7 G/DL (ref 31.5–36.5)
MCHC RBC AUTO-ENTMCNC: 29.1 G/DL (ref 31.5–36.5)
MCHC RBC AUTO-ENTMCNC: 29.3 G/DL (ref 31.5–36.5)
MCHC RBC AUTO-ENTMCNC: 29.5 G/DL (ref 31.5–36.5)
MCHC RBC AUTO-ENTMCNC: 30 G/DL (ref 31.5–36.5)
MCHC RBC AUTO-ENTMCNC: 30.4 G/DL (ref 31.5–36.5)
MCHC RBC AUTO-ENTMCNC: 30.4 G/DL (ref 31.5–36.5)
MCHC RBC AUTO-ENTMCNC: 30.5 G/DL (ref 31.5–36.5)
MCHC RBC AUTO-ENTMCNC: 30.6 G/DL (ref 31.5–36.5)
MCV RBC AUTO: 88 FL (ref 78–100)
MCV RBC AUTO: 89 FL (ref 78–100)
MCV RBC AUTO: 89 FL (ref 78–100)
MCV RBC AUTO: 90 FL (ref 78–100)
MCV RBC AUTO: 92 FL (ref 78–100)
MCV RBC AUTO: 92 FL (ref 78–100)
MCV RBC AUTO: 93 FL (ref 78–100)
MCV RBC AUTO: 93 FL (ref 78–100)
MCV RBC AUTO: 94 FL (ref 78–100)
MONOCYTES # BLD AUTO: 0.3 10E9/L (ref 0–1.3)
MONOCYTES # BLD AUTO: 0.6 10E9/L (ref 0–1.3)
MONOCYTES # BLD AUTO: 0.7 10E9/L (ref 0–1.3)
MONOCYTES # BLD AUTO: 0.9 10E9/L (ref 0–1.3)
MONOCYTES NFR BLD AUTO: 3.3 %
MONOCYTES NFR BLD AUTO: 8 %
MONOCYTES NFR BLD AUTO: 8.8 %
MONOCYTES NFR BLD AUTO: 8.8 %
MUCOUS THREADS #/AREA URNS LPF: PRESENT /LPF
NEUTROPHILS # BLD AUTO: 5.6 10E9/L (ref 1.6–8.3)
NEUTROPHILS # BLD AUTO: 5.8 10E9/L (ref 1.6–8.3)
NEUTROPHILS # BLD AUTO: 6.3 10E9/L (ref 1.6–8.3)
NEUTROPHILS # BLD AUTO: 7 10E9/L (ref 1.6–8.3)
NEUTROPHILS NFR BLD AUTO: 63.9 %
NEUTROPHILS NFR BLD AUTO: 71.5 %
NEUTROPHILS NFR BLD AUTO: 74.1 %
NEUTROPHILS NFR BLD AUTO: 87.1 %
NITRATE UR QL: POSITIVE
NRBC # BLD AUTO: 0 10*3/UL
NRBC BLD AUTO-RTO: 0 /100
NT-PROBNP SERPL-MCNC: 568 PG/ML (ref 0–1800)
PH UR STRIP: 5 PH (ref 5–7)
PLATELET # BLD AUTO: 183 10E9/L (ref 150–450)
PLATELET # BLD AUTO: 214 10E9/L (ref 150–450)
PLATELET # BLD AUTO: 217 10E9/L (ref 150–450)
PLATELET # BLD AUTO: 236 10E9/L (ref 150–450)
PLATELET # BLD AUTO: 237 10E9/L (ref 150–450)
PLATELET # BLD AUTO: 238 10E9/L (ref 150–450)
PLATELET # BLD AUTO: 252 10E9/L (ref 150–450)
PLATELET # BLD AUTO: 261 10E9/L (ref 150–450)
PLATELET # BLD AUTO: 309 10E9/L (ref 150–450)
POTASSIUM SERPL-SCNC: 3.6 MMOL/L (ref 3.4–5.3)
POTASSIUM SERPL-SCNC: 3.8 MMOL/L (ref 3.4–5.3)
POTASSIUM SERPL-SCNC: 3.9 MMOL/L (ref 3.4–5.3)
POTASSIUM SERPL-SCNC: 4.1 MMOL/L (ref 3.4–5.3)
POTASSIUM SERPL-SCNC: 4.2 MMOL/L (ref 3.4–5.3)
PROT SERPL-MCNC: 6.1 G/DL (ref 6.8–8.8)
PROT SERPL-MCNC: 6.2 G/DL (ref 6.8–8.8)
PROT SERPL-MCNC: 6.5 G/DL (ref 6.8–8.8)
PROT SERPL-MCNC: 6.6 G/DL (ref 6.8–8.8)
RBC # BLD AUTO: 3.08 10E12/L (ref 3.8–5.2)
RBC # BLD AUTO: 3.41 10E12/L (ref 3.8–5.2)
RBC # BLD AUTO: 3.51 10E12/L (ref 3.8–5.2)
RBC # BLD AUTO: 3.57 10E12/L (ref 3.8–5.2)
RBC # BLD AUTO: 3.59 10E12/L (ref 3.8–5.2)
RBC # BLD AUTO: 3.97 10E12/L (ref 3.8–5.2)
RBC # BLD AUTO: 4.1 10E12/L (ref 3.8–5.2)
RBC # BLD AUTO: 4.23 10E12/L (ref 3.8–5.2)
RBC # BLD AUTO: 4.35 10E12/L (ref 3.8–5.2)
RBC #/AREA URNS AUTO: <1 /HPF (ref 0–2)
SARS-COV-2 PCR COMMENT: NORMAL
SARS-COV-2 PCR COMMENT: NORMAL
SARS-COV-2 RNA SPEC QL NAA+PROBE: NEGATIVE
SARS-COV-2 RNA SPEC QL NAA+PROBE: NEGATIVE
SARS-COV-2 RNA SPEC QL NAA+PROBE: NORMAL
SARS-COV-2 RNA SPEC QL NAA+PROBE: NORMAL
SARS-COV-2 RNA SPEC QL NAA+PROBE: NOT DETECTED
SODIUM SERPL-SCNC: 139 MMOL/L (ref 133–144)
SODIUM SERPL-SCNC: 142 MMOL/L (ref 133–144)
SODIUM SERPL-SCNC: 143 MMOL/L (ref 133–144)
SODIUM SERPL-SCNC: 144 MMOL/L (ref 133–144)
SODIUM SERPL-SCNC: 145 MMOL/L (ref 133–144)
SOURCE: ABNORMAL
SP GR UR STRIP: 1.02 (ref 1–1.03)
SPECIMEN EXP DATE BLD: NORMAL
SPECIMEN SOURCE: NORMAL
SQUAMOUS #/AREA URNS AUTO: 1 /HPF (ref 0–1)
TROPONIN I SERPL-MCNC: 0.02 UG/L (ref 0–0.04)
TROPONIN I SERPL-MCNC: 0.03 UG/L (ref 0–0.04)
TROPONIN I SERPL-MCNC: 0.03 UG/L (ref 0–0.04)
UROBILINOGEN UR STRIP-MCNC: 0 MG/DL (ref 0–2)
WBC # BLD AUTO: 4.9 10E9/L (ref 4–11)
WBC # BLD AUTO: 6.2 10E9/L (ref 4–11)
WBC # BLD AUTO: 6.4 10E9/L (ref 4–11)
WBC # BLD AUTO: 7.8 10E9/L (ref 4–11)
WBC # BLD AUTO: 7.9 10E9/L (ref 4–11)
WBC # BLD AUTO: 7.9 10E9/L (ref 4–11)
WBC # BLD AUTO: 8 10E9/L (ref 4–11)
WBC # BLD AUTO: 8.1 10E9/L (ref 4–11)
WBC # BLD AUTO: 9.8 10E9/L (ref 4–11)
WBC #/AREA URNS AUTO: 3 /HPF (ref 0–5)

## 2020-01-01 PROCEDURE — 96365 THER/PROPH/DIAG IV INF INIT: CPT | Performed by: EMERGENCY MEDICINE

## 2020-01-01 PROCEDURE — 72125 CT NECK SPINE W/O DYE: CPT

## 2020-01-01 PROCEDURE — 40000274 ZZH STATISTIC RCP CONSULT EA 30 MIN

## 2020-01-01 PROCEDURE — 25000131 ZZH RX MED GY IP 250 OP 636 PS 637: Performed by: PHYSICIAN ASSISTANT

## 2020-01-01 PROCEDURE — 93010 ELECTROCARDIOGRAM REPORT: CPT | Mod: Z6 | Performed by: EMERGENCY MEDICINE

## 2020-01-01 PROCEDURE — 99285 EMERGENCY DEPT VISIT HI MDM: CPT | Mod: 25 | Performed by: EMERGENCY MEDICINE

## 2020-01-01 PROCEDURE — 25000128 H RX IP 250 OP 636: Performed by: EMERGENCY MEDICINE

## 2020-01-01 PROCEDURE — 25000132 ZZH RX MED GY IP 250 OP 250 PS 637: Performed by: PHYSICIAN ASSISTANT

## 2020-01-01 PROCEDURE — 36415 COLL VENOUS BLD VENIPUNCTURE: CPT | Performed by: FAMILY MEDICINE

## 2020-01-01 PROCEDURE — 94640 AIRWAY INHALATION TREATMENT: CPT | Mod: 76

## 2020-01-01 PROCEDURE — 99285 EMERGENCY DEPT VISIT HI MDM: CPT | Mod: 25 | Performed by: FAMILY MEDICINE

## 2020-01-01 PROCEDURE — 97165 OT EVAL LOW COMPLEX 30 MIN: CPT | Mod: GO

## 2020-01-01 PROCEDURE — 99606 MTMS BY PHARM EST 15 MIN: CPT | Performed by: PHARMACIST

## 2020-01-01 PROCEDURE — 25000132 ZZH RX MED GY IP 250 OP 250 PS 637: Performed by: FAMILY MEDICINE

## 2020-01-01 PROCEDURE — 86140 C-REACTIVE PROTEIN: CPT | Performed by: PHYSICIAN ASSISTANT

## 2020-01-01 PROCEDURE — 97530 THERAPEUTIC ACTIVITIES: CPT | Mod: GP | Performed by: PHYSICAL THERAPIST

## 2020-01-01 PROCEDURE — 36415 COLL VENOUS BLD VENIPUNCTURE: CPT | Performed by: PHYSICIAN ASSISTANT

## 2020-01-01 PROCEDURE — 85610 PROTHROMBIN TIME: CPT | Performed by: FAMILY MEDICINE

## 2020-01-01 PROCEDURE — 80053 COMPREHEN METABOLIC PANEL: CPT | Performed by: EMERGENCY MEDICINE

## 2020-01-01 PROCEDURE — 25000125 ZZHC RX 250: Performed by: FAMILY MEDICINE

## 2020-01-01 PROCEDURE — 94640 AIRWAY INHALATION TREATMENT: CPT

## 2020-01-01 PROCEDURE — 86850 RBC ANTIBODY SCREEN: CPT | Performed by: EMERGENCY MEDICINE

## 2020-01-01 PROCEDURE — 80048 BASIC METABOLIC PNL TOTAL CA: CPT | Performed by: NURSE PRACTITIONER

## 2020-01-01 PROCEDURE — 36415 COLL VENOUS BLD VENIPUNCTURE: CPT | Performed by: INTERNAL MEDICINE

## 2020-01-01 PROCEDURE — 80053 COMPREHEN METABOLIC PANEL: CPT | Performed by: PHYSICIAN ASSISTANT

## 2020-01-01 PROCEDURE — 99607 MTMS BY PHARM ADDL 15 MIN: CPT | Performed by: PHARMACIST

## 2020-01-01 PROCEDURE — 71046 X-RAY EXAM CHEST 2 VIEWS: CPT

## 2020-01-01 PROCEDURE — 74177 CT ABD & PELVIS W/CONTRAST: CPT

## 2020-01-01 PROCEDURE — 99239 HOSP IP/OBS DSCHRG MGMT >30: CPT | Performed by: FAMILY MEDICINE

## 2020-01-01 PROCEDURE — 73502 X-RAY EXAM HIP UNI 2-3 VIEWS: CPT

## 2020-01-01 PROCEDURE — 93010 ELECTROCARDIOGRAM REPORT: CPT | Mod: Z6 | Performed by: FAMILY MEDICINE

## 2020-01-01 PROCEDURE — 99309 SBSQ NF CARE MODERATE MDM 30: CPT | Mod: GT | Performed by: NURSE PRACTITIONER

## 2020-01-01 PROCEDURE — 99309 SBSQ NF CARE MODERATE MDM 30: CPT | Performed by: NURSE PRACTITIONER

## 2020-01-01 PROCEDURE — 25000128 H RX IP 250 OP 636: Performed by: FAMILY MEDICINE

## 2020-01-01 PROCEDURE — 85027 COMPLETE CBC AUTOMATED: CPT | Performed by: NURSE PRACTITIONER

## 2020-01-01 PROCEDURE — 85018 HEMOGLOBIN: CPT | Performed by: FAMILY MEDICINE

## 2020-01-01 PROCEDURE — 25000132 ZZH RX MED GY IP 250 OP 250 PS 637: Performed by: INTERNAL MEDICINE

## 2020-01-01 PROCEDURE — 86900 BLOOD TYPING SEROLOGIC ABO: CPT | Performed by: EMERGENCY MEDICINE

## 2020-01-01 PROCEDURE — 85027 COMPLETE CBC AUTOMATED: CPT | Performed by: FAMILY MEDICINE

## 2020-01-01 PROCEDURE — 12000000 ZZH R&B MED SURG/OB

## 2020-01-01 PROCEDURE — 99309 SBSQ NF CARE MODERATE MDM 30: CPT | Mod: GV | Performed by: NURSE PRACTITIONER

## 2020-01-01 PROCEDURE — 99232 SBSQ HOSP IP/OBS MODERATE 35: CPT | Performed by: FAMILY MEDICINE

## 2020-01-01 PROCEDURE — 93005 ELECTROCARDIOGRAM TRACING: CPT | Performed by: FAMILY MEDICINE

## 2020-01-01 PROCEDURE — 99316 NF DSCHRG MGMT 30 MIN+: CPT | Mod: GT | Performed by: NURSE PRACTITIONER

## 2020-01-01 PROCEDURE — 83690 ASSAY OF LIPASE: CPT | Performed by: EMERGENCY MEDICINE

## 2020-01-01 PROCEDURE — 80048 BASIC METABOLIC PNL TOTAL CA: CPT | Performed by: FAMILY MEDICINE

## 2020-01-01 PROCEDURE — 25800030 ZZH RX IP 258 OP 636: Performed by: FAMILY MEDICINE

## 2020-01-01 PROCEDURE — 83880 ASSAY OF NATRIURETIC PEPTIDE: CPT | Performed by: EMERGENCY MEDICINE

## 2020-01-01 PROCEDURE — 25000128 H RX IP 250 OP 636: Performed by: PHYSICIAN ASSISTANT

## 2020-01-01 PROCEDURE — 96374 THER/PROPH/DIAG INJ IV PUSH: CPT | Performed by: EMERGENCY MEDICINE

## 2020-01-01 PROCEDURE — 25800030 ZZH RX IP 258 OP 636: Performed by: PHYSICIAN ASSISTANT

## 2020-01-01 PROCEDURE — 96376 TX/PRO/DX INJ SAME DRUG ADON: CPT | Performed by: EMERGENCY MEDICINE

## 2020-01-01 PROCEDURE — 87804 INFLUENZA ASSAY W/OPTIC: CPT | Performed by: EMERGENCY MEDICINE

## 2020-01-01 PROCEDURE — 99223 1ST HOSP IP/OBS HIGH 75: CPT | Mod: AI | Performed by: PHYSICIAN ASSISTANT

## 2020-01-01 PROCEDURE — 82728 ASSAY OF FERRITIN: CPT | Performed by: PHYSICIAN ASSISTANT

## 2020-01-01 PROCEDURE — 99233 SBSQ HOSP IP/OBS HIGH 50: CPT | Performed by: FAMILY MEDICINE

## 2020-01-01 PROCEDURE — 99231 SBSQ HOSP IP/OBS SF/LOW 25: CPT | Mod: GT | Performed by: INTERNAL MEDICINE

## 2020-01-01 PROCEDURE — 80048 BASIC METABOLIC PNL TOTAL CA: CPT | Performed by: PHYSICIAN ASSISTANT

## 2020-01-01 PROCEDURE — 84484 ASSAY OF TROPONIN QUANT: CPT | Performed by: EMERGENCY MEDICINE

## 2020-01-01 PROCEDURE — 84484 ASSAY OF TROPONIN QUANT: CPT | Performed by: FAMILY MEDICINE

## 2020-01-01 PROCEDURE — 72131 CT LUMBAR SPINE W/O DYE: CPT

## 2020-01-01 PROCEDURE — 84484 ASSAY OF TROPONIN QUANT: CPT | Mod: 91 | Performed by: FAMILY MEDICINE

## 2020-01-01 PROCEDURE — 85610 PROTHROMBIN TIME: CPT | Performed by: PHYSICIAN ASSISTANT

## 2020-01-01 PROCEDURE — 70450 CT HEAD/BRAIN W/O DYE: CPT

## 2020-01-01 PROCEDURE — 81001 URINALYSIS AUTO W/SCOPE: CPT | Performed by: EMERGENCY MEDICINE

## 2020-01-01 PROCEDURE — 93005 ELECTROCARDIOGRAM TRACING: CPT | Performed by: EMERGENCY MEDICINE

## 2020-01-01 PROCEDURE — 85610 PROTHROMBIN TIME: CPT | Performed by: NURSE PRACTITIONER

## 2020-01-01 PROCEDURE — 73590 X-RAY EXAM OF LOWER LEG: CPT | Mod: 50

## 2020-01-01 PROCEDURE — 85025 COMPLETE CBC W/AUTO DIFF WBC: CPT | Performed by: EMERGENCY MEDICINE

## 2020-01-01 PROCEDURE — 94645 CONT INHLJ TX EACH ADDL HOUR: CPT

## 2020-01-01 PROCEDURE — 83605 ASSAY OF LACTIC ACID: CPT | Performed by: EMERGENCY MEDICINE

## 2020-01-01 PROCEDURE — 25000125 ZZHC RX 250: Performed by: EMERGENCY MEDICINE

## 2020-01-01 PROCEDURE — 99305 1ST NF CARE MODERATE MDM 35: CPT | Performed by: INTERNAL MEDICINE

## 2020-01-01 PROCEDURE — 85610 PROTHROMBIN TIME: CPT | Performed by: EMERGENCY MEDICINE

## 2020-01-01 PROCEDURE — 99223 1ST HOSP IP/OBS HIGH 75: CPT | Mod: GT | Performed by: PHYSICIAN ASSISTANT

## 2020-01-01 PROCEDURE — 86901 BLOOD TYPING SEROLOGIC RH(D): CPT | Performed by: EMERGENCY MEDICINE

## 2020-01-01 PROCEDURE — 85027 COMPLETE CBC AUTOMATED: CPT | Performed by: PHYSICIAN ASSISTANT

## 2020-01-01 PROCEDURE — 72192 CT PELVIS W/O DYE: CPT

## 2020-01-01 PROCEDURE — 40000275 ZZH STATISTIC RCP TIME EA 10 MIN

## 2020-01-01 PROCEDURE — 97535 SELF CARE MNGMENT TRAINING: CPT | Mod: GO

## 2020-01-01 PROCEDURE — U0003 INFECTIOUS AGENT DETECTION BY NUCLEIC ACID (DNA OR RNA); SEVERE ACUTE RESPIRATORY SYNDROME CORONAVIRUS 2 (SARS-COV-2) (CORONAVIRUS DISEASE [COVID-19]), AMPLIFIED PROBE TECHNIQUE, MAKING USE OF HIGH THROUGHPUT TECHNOLOGIES AS DESCRIBED BY CMS-2020-01-R: HCPCS | Performed by: FAMILY MEDICINE

## 2020-01-01 PROCEDURE — 25000125 ZZHC RX 250: Performed by: PHYSICIAN ASSISTANT

## 2020-01-01 PROCEDURE — 97161 PT EVAL LOW COMPLEX 20 MIN: CPT | Mod: GP | Performed by: PHYSICAL THERAPIST

## 2020-01-01 PROCEDURE — 97162 PT EVAL MOD COMPLEX 30 MIN: CPT | Mod: GP | Performed by: PHYSICAL THERAPIST

## 2020-01-01 PROCEDURE — 96375 TX/PRO/DX INJ NEW DRUG ADDON: CPT | Performed by: EMERGENCY MEDICINE

## 2020-01-01 PROCEDURE — 96361 HYDRATE IV INFUSION ADD-ON: CPT | Mod: 59 | Performed by: EMERGENCY MEDICINE

## 2020-01-01 PROCEDURE — 25000132 ZZH RX MED GY IP 250 OP 250 PS 637: Performed by: EMERGENCY MEDICINE

## 2020-01-01 PROCEDURE — 93005 ELECTROCARDIOGRAM TRACING: CPT | Mod: 76 | Performed by: EMERGENCY MEDICINE

## 2020-01-01 PROCEDURE — 25800030 ZZH RX IP 258 OP 636: Performed by: EMERGENCY MEDICINE

## 2020-01-01 PROCEDURE — 71250 CT THORAX DX C-: CPT

## 2020-01-01 RX ORDER — ACETAMINOPHEN 500 MG
1000 TABLET ORAL 3 TIMES DAILY
Status: DISCONTINUED | OUTPATIENT
Start: 2020-01-01 | End: 2020-01-01 | Stop reason: HOSPADM

## 2020-01-01 RX ORDER — ATORVASTATIN CALCIUM 20 MG/1
40 TABLET, FILM COATED ORAL EVERY EVENING
Status: DISCONTINUED | OUTPATIENT
Start: 2020-01-01 | End: 2020-01-01 | Stop reason: HOSPADM

## 2020-01-01 RX ORDER — AZITHROMYCIN 250 MG/1
250 TABLET, FILM COATED ORAL DAILY
Qty: 2 TABLET | Refills: 0 | Status: ON HOLD | OUTPATIENT
Start: 2020-01-01 | End: 2020-01-01

## 2020-01-01 RX ORDER — PROCHLORPERAZINE MALEATE 10 MG
10 TABLET ORAL 3 TIMES DAILY
COMMUNITY
Start: 2020-01-01 | End: 2020-01-01

## 2020-01-01 RX ORDER — ALBUTEROL SULFATE 90 UG/1
2 POWDER, METERED RESPIRATORY (INHALATION) EVERY 6 HOURS PRN
Qty: 1 INHALER | Refills: 11 | Status: SHIPPED | OUTPATIENT
Start: 2020-01-01

## 2020-01-01 RX ORDER — OXYCODONE HYDROCHLORIDE 10 MG/1
TABLET, FILM COATED, EXTENDED RELEASE ORAL
Qty: 60 TABLET | Refills: 0 | Status: ON HOLD | OUTPATIENT
Start: 2020-01-01 | End: 2020-01-01

## 2020-01-01 RX ORDER — LISINOPRIL 20 MG/1
20 TABLET ORAL DAILY
Status: DISCONTINUED | OUTPATIENT
Start: 2020-01-01 | End: 2020-01-01 | Stop reason: HOSPADM

## 2020-01-01 RX ORDER — NIFEDIPINE 30 MG/1
60 TABLET, EXTENDED RELEASE ORAL DAILY
Status: DISCONTINUED | OUTPATIENT
Start: 2020-01-01 | End: 2020-01-01 | Stop reason: HOSPADM

## 2020-01-01 RX ORDER — LEVALBUTEROL INHALATION SOLUTION 1.25 MG/3ML
1 SOLUTION RESPIRATORY (INHALATION)
Status: DISCONTINUED | OUTPATIENT
Start: 2020-01-01 | End: 2020-01-01 | Stop reason: HOSPADM

## 2020-01-01 RX ORDER — HYDROMORPHONE HYDROCHLORIDE 2 MG/1
TABLET ORAL
Qty: 12 TABLET | Refills: 0 | Status: SHIPPED | OUTPATIENT
Start: 2020-01-01 | End: 2020-01-01

## 2020-01-01 RX ORDER — HALOPERIDOL 0.5 MG/1
0.5 TABLET ORAL EVERY 6 HOURS PRN
COMMUNITY
Start: 2020-01-01

## 2020-01-01 RX ORDER — ONDANSETRON 2 MG/ML
4 INJECTION INTRAMUSCULAR; INTRAVENOUS ONCE
Status: COMPLETED | OUTPATIENT
Start: 2020-01-01 | End: 2020-01-01

## 2020-01-01 RX ORDER — DULOXETIN HYDROCHLORIDE 60 MG/1
60 CAPSULE, DELAYED RELEASE ORAL DAILY
COMMUNITY
Start: 2020-01-01 | End: 2020-01-01

## 2020-01-01 RX ORDER — NALOXONE HYDROCHLORIDE 0.4 MG/ML
.1-.4 INJECTION, SOLUTION INTRAMUSCULAR; INTRAVENOUS; SUBCUTANEOUS
Status: DISCONTINUED | OUTPATIENT
Start: 2020-01-01 | End: 2020-01-01 | Stop reason: HOSPADM

## 2020-01-01 RX ORDER — DULOXETIN HYDROCHLORIDE 30 MG/1
CAPSULE, DELAYED RELEASE ORAL
Qty: 28 CAPSULE | Refills: 97 | Status: SHIPPED | OUTPATIENT
Start: 2020-01-01 | End: 2020-01-01

## 2020-01-01 RX ORDER — DULOXETIN HYDROCHLORIDE 60 MG/1
CAPSULE, DELAYED RELEASE ORAL
Qty: 31 CAPSULE | Status: SHIPPED | OUTPATIENT
Start: 2020-01-01 | End: 2020-01-01

## 2020-01-01 RX ORDER — WARFARIN SODIUM 3 MG/1
3 TABLET ORAL
Status: DISCONTINUED | OUTPATIENT
Start: 2020-01-01 | End: 2020-01-01 | Stop reason: HOSPADM

## 2020-01-01 RX ORDER — GABAPENTIN 100 MG/1
CAPSULE ORAL
Qty: 21 CAPSULE | Refills: 0 | Status: SHIPPED | OUTPATIENT
Start: 2020-01-01 | End: 2020-01-01

## 2020-01-01 RX ORDER — PREDNISONE 5 MG/1
5 TABLET ORAL DAILY
Status: DISCONTINUED | OUTPATIENT
Start: 2020-01-01 | End: 2020-01-01 | Stop reason: HOSPADM

## 2020-01-01 RX ORDER — AMOXICILLIN 250 MG
2 CAPSULE ORAL 2 TIMES DAILY PRN
Status: DISCONTINUED | OUTPATIENT
Start: 2020-01-01 | End: 2020-01-01 | Stop reason: HOSPADM

## 2020-01-01 RX ORDER — OXYCODONE HYDROCHLORIDE 10 MG/1
TABLET, FILM COATED, EXTENDED RELEASE ORAL
Qty: 60 TABLET | Refills: 0 | Status: SHIPPED | OUTPATIENT
Start: 2020-01-01 | End: 2020-01-01

## 2020-01-01 RX ORDER — FENTANYL CITRATE 50 UG/ML
25 INJECTION, SOLUTION INTRAMUSCULAR; INTRAVENOUS
Status: COMPLETED | OUTPATIENT
Start: 2020-01-01 | End: 2020-01-01

## 2020-01-01 RX ORDER — PSEUDOEPHED/ACETAMINOPH/DIPHEN 30MG-500MG
TABLET ORAL
Qty: 180 TABLET | Refills: 97 | Status: SHIPPED | OUTPATIENT
Start: 2020-01-01 | End: 2020-01-01

## 2020-01-01 RX ORDER — LORAZEPAM 2 MG/ML
CONCENTRATE ORAL
COMMUNITY
Start: 2020-01-01

## 2020-01-01 RX ORDER — OXYCODONE HCL 10 MG/1
TABLET, FILM COATED, EXTENDED RELEASE ORAL
Qty: 12 TABLET | Refills: 0 | Status: SHIPPED | DISCHARGE
Start: 2020-01-01 | End: 2020-01-01

## 2020-01-01 RX ORDER — LORAZEPAM 0.5 MG/1
0.5 TABLET ORAL EVERY 4 HOURS PRN
COMMUNITY
Start: 2020-01-01 | End: 2020-01-01

## 2020-01-01 RX ORDER — HYDROMORPHONE HYDROCHLORIDE 2 MG/1
1 TABLET ORAL EVERY 4 HOURS PRN
Qty: 12 TABLET | Refills: 0 | COMMUNITY
Start: 2020-01-01

## 2020-01-01 RX ORDER — WARFARIN SODIUM 1 MG/1
TABLET ORAL
Qty: 90 TABLET | Refills: 1 | Status: SHIPPED | OUTPATIENT
Start: 2020-01-01 | End: 2020-01-01

## 2020-01-01 RX ORDER — PREDNISONE 5 MG/1
TABLET ORAL
Qty: 31 TABLET | Status: SHIPPED | OUTPATIENT
Start: 2020-01-01 | End: 2020-01-01

## 2020-01-01 RX ORDER — LISINOPRIL 20 MG/1
TABLET ORAL
Qty: 28 TABLET | Refills: 97 | Status: SHIPPED | OUTPATIENT
Start: 2020-01-01 | End: 2020-01-01

## 2020-01-01 RX ORDER — OXYCODONE HCL 10 MG/1
10 TABLET, FILM COATED, EXTENDED RELEASE ORAL EVERY 12 HOURS
Qty: 60 TABLET | Refills: 0 | Status: SHIPPED | OUTPATIENT
Start: 2020-01-01 | End: 2020-01-01

## 2020-01-01 RX ORDER — ATORVASTATIN CALCIUM 40 MG/1
TABLET, FILM COATED ORAL
Qty: 28 TABLET | Refills: 97 | Status: SHIPPED | OUTPATIENT
Start: 2020-01-01 | End: 2020-01-01

## 2020-01-01 RX ORDER — ONDANSETRON 2 MG/ML
4 INJECTION INTRAMUSCULAR; INTRAVENOUS EVERY 6 HOURS PRN
Status: DISCONTINUED | OUTPATIENT
Start: 2020-01-01 | End: 2020-01-01 | Stop reason: HOSPADM

## 2020-01-01 RX ORDER — FERROUS SULFATE 325(65) MG
325 TABLET ORAL DAILY
Status: DISCONTINUED | OUTPATIENT
Start: 2020-01-01 | End: 2020-01-01 | Stop reason: HOSPADM

## 2020-01-01 RX ORDER — HYDROMORPHONE HYDROCHLORIDE 2 MG/1
2 TABLET ORAL 2 TIMES DAILY PRN
Status: DISCONTINUED | OUTPATIENT
Start: 2020-01-01 | End: 2020-01-01

## 2020-01-01 RX ORDER — METHADONE HYDROCHLORIDE 5 MG/1
2.5 TABLET ORAL EVERY MORNING
Qty: 30 TABLET | Refills: 0 | Status: SHIPPED | OUTPATIENT
Start: 2020-01-01 | End: 2020-01-01

## 2020-01-01 RX ORDER — GABAPENTIN 100 MG/1
100 CAPSULE ORAL DAILY
Status: DISCONTINUED | OUTPATIENT
Start: 2020-01-01 | End: 2020-01-01 | Stop reason: HOSPADM

## 2020-01-01 RX ORDER — CEPHALEXIN 500 MG/1
500 CAPSULE ORAL 2 TIMES DAILY
Qty: 14 CAPSULE | Refills: 0
Start: 2020-01-01 | End: 2020-01-01

## 2020-01-01 RX ORDER — AMOXICILLIN 250 MG
1 CAPSULE ORAL 2 TIMES DAILY PRN
Status: DISCONTINUED | OUTPATIENT
Start: 2020-01-01 | End: 2020-01-01 | Stop reason: HOSPADM

## 2020-01-01 RX ORDER — OXYCODONE HCL 10 MG/1
TABLET, FILM COATED, EXTENDED RELEASE ORAL
Qty: 60 TABLET | Refills: 0 | Status: SHIPPED | OUTPATIENT
Start: 2020-01-01 | End: 2020-01-01

## 2020-01-01 RX ORDER — DULOXETIN HYDROCHLORIDE 60 MG/1
CAPSULE, DELAYED RELEASE ORAL
Qty: 28 CAPSULE | Refills: 97 | Status: SHIPPED | OUTPATIENT
Start: 2020-01-01 | End: 2020-01-01

## 2020-01-01 RX ORDER — PREDNISONE 5 MG/1
TABLET ORAL
Qty: 31 TABLET | Refills: 97 | Status: SHIPPED | OUTPATIENT
Start: 2020-01-01 | End: 2020-01-01

## 2020-01-01 RX ORDER — IOPAMIDOL 755 MG/ML
91 INJECTION, SOLUTION INTRAVASCULAR ONCE
Status: COMPLETED | OUTPATIENT
Start: 2020-01-01 | End: 2020-01-01

## 2020-01-01 RX ORDER — DULOXETIN HYDROCHLORIDE 30 MG/1
30 CAPSULE, DELAYED RELEASE ORAL AT BEDTIME
Status: DISCONTINUED | OUTPATIENT
Start: 2020-01-01 | End: 2020-01-01 | Stop reason: HOSPADM

## 2020-01-01 RX ORDER — GABAPENTIN 100 MG/1
200 CAPSULE ORAL 2 TIMES DAILY
Status: DISCONTINUED | OUTPATIENT
Start: 2020-01-01 | End: 2020-01-01

## 2020-01-01 RX ORDER — HYDROMORPHONE HYDROCHLORIDE 2 MG/1
2 TABLET ORAL 2 TIMES DAILY PRN
Status: DISCONTINUED | OUTPATIENT
Start: 2020-01-01 | End: 2020-01-01 | Stop reason: HOSPADM

## 2020-01-01 RX ORDER — DOCUSATE SODIUM 50MG AND SENNOSIDES 8.6MG 8.6; 5 MG/1; MG/1
TABLET, FILM COATED ORAL
Qty: 112 TABLET | Refills: 97 | Status: SHIPPED | OUTPATIENT
Start: 2020-01-01 | End: 2020-01-01

## 2020-01-01 RX ORDER — PREDNISONE 20 MG/1
40 TABLET ORAL DAILY
Qty: 4 TABLET | Refills: 0 | Status: ON HOLD | OUTPATIENT
Start: 2020-01-01 | End: 2020-01-01

## 2020-01-01 RX ORDER — HYDROMORPHONE HYDROCHLORIDE 2 MG/1
2 TABLET ORAL EVERY 4 HOURS PRN
Status: DISCONTINUED | OUTPATIENT
Start: 2020-01-01 | End: 2020-01-01 | Stop reason: HOSPADM

## 2020-01-01 RX ORDER — WARFARIN SODIUM 2.5 MG/1
2.5 TABLET ORAL
Status: COMPLETED | OUTPATIENT
Start: 2020-01-01 | End: 2020-01-01

## 2020-01-01 RX ORDER — CHOLECALCIFEROL (VITAMIN D3) 25 MCG
TABLET ORAL
Qty: 30 TABLET | Refills: 97 | Status: SHIPPED | OUTPATIENT
Start: 2020-01-01 | End: 2020-01-01

## 2020-01-01 RX ORDER — OMEPRAZOLE 40 MG/1
CAPSULE, DELAYED RELEASE ORAL
Qty: 30 CAPSULE | Refills: 0 | Status: SHIPPED | OUTPATIENT
Start: 2020-01-01 | End: 2020-01-01

## 2020-01-01 RX ORDER — OXYCODONE HCL 10 MG/1
10 TABLET, FILM COATED, EXTENDED RELEASE ORAL EVERY 12 HOURS
Status: DISCONTINUED | OUTPATIENT
Start: 2020-01-01 | End: 2020-01-01

## 2020-01-01 RX ORDER — PREDNISONE 20 MG/1
40 TABLET ORAL DAILY
Status: DISCONTINUED | OUTPATIENT
Start: 2020-01-01 | End: 2020-01-01 | Stop reason: HOSPADM

## 2020-01-01 RX ORDER — LIDOCAINE 4 G/G
1 PATCH TOPICAL EVERY 24 HOURS
Status: ON HOLD | COMMUNITY
End: 2020-01-01

## 2020-01-01 RX ORDER — WARFARIN SODIUM 2 MG/1
2 TABLET ORAL
Status: DISCONTINUED | OUTPATIENT
Start: 2020-01-01 | End: 2020-01-01 | Stop reason: HOSPADM

## 2020-01-01 RX ORDER — CALCIUM CARBONATE 500 MG/1
1 TABLET, CHEWABLE ORAL 3 TIMES DAILY PRN
COMMUNITY
End: 2020-01-01

## 2020-01-01 RX ORDER — IPRATROPIUM BROMIDE AND ALBUTEROL SULFATE 2.5; .5 MG/3ML; MG/3ML
3 SOLUTION RESPIRATORY (INHALATION)
Status: DISCONTINUED | OUTPATIENT
Start: 2020-01-01 | End: 2020-01-01

## 2020-01-01 RX ORDER — ASPIRIN 81 MG
TABLET,CHEWABLE ORAL
Qty: 30 TABLET | Refills: 0 | Status: SHIPPED | OUTPATIENT
Start: 2020-01-01 | End: 2020-01-01

## 2020-01-01 RX ORDER — ATORVASTATIN CALCIUM 40 MG/1
TABLET, FILM COATED ORAL
Qty: 28 TABLET | Status: SHIPPED | OUTPATIENT
Start: 2020-01-01 | End: 2020-01-01

## 2020-01-01 RX ORDER — GABAPENTIN 100 MG/1
200 CAPSULE ORAL DAILY
Status: COMPLETED | OUTPATIENT
Start: 2020-01-01 | End: 2020-01-01

## 2020-01-01 RX ORDER — PROCHLORPERAZINE MALEATE 5 MG
5 TABLET ORAL EVERY 6 HOURS PRN
Status: DISCONTINUED | OUTPATIENT
Start: 2020-01-01 | End: 2020-01-01 | Stop reason: HOSPADM

## 2020-01-01 RX ORDER — METHADONE HYDROCHLORIDE 5 MG/1
2.5 TABLET ORAL EVERY MORNING
Refills: 0 | COMMUNITY
Start: 2020-01-01 | End: 2020-01-01

## 2020-01-01 RX ORDER — IPRATROPIUM BROMIDE AND ALBUTEROL SULFATE 2.5; .5 MG/3ML; MG/3ML
3 SOLUTION RESPIRATORY (INHALATION) ONCE
Status: COMPLETED | OUTPATIENT
Start: 2020-01-01 | End: 2020-01-01

## 2020-01-01 RX ORDER — FERROUS SULFATE 325(65) MG
325 TABLET ORAL DAILY
Qty: 30 TABLET | Refills: 1 | Status: SHIPPED | OUTPATIENT
Start: 2020-01-01 | End: 2020-01-01

## 2020-01-01 RX ORDER — ACETAMINOPHEN 325 MG/1
650 TABLET ORAL EVERY 4 HOURS PRN
Status: DISCONTINUED | OUTPATIENT
Start: 2020-01-01 | End: 2020-01-01

## 2020-01-01 RX ORDER — WARFARIN SODIUM 3 MG/1
3 TABLET ORAL
Status: COMPLETED | OUTPATIENT
Start: 2020-01-01 | End: 2020-01-01

## 2020-01-01 RX ORDER — ONDANSETRON 4 MG/1
4 TABLET, ORALLY DISINTEGRATING ORAL EVERY 6 HOURS PRN
Status: DISCONTINUED | OUTPATIENT
Start: 2020-01-01 | End: 2020-01-01 | Stop reason: HOSPADM

## 2020-01-01 RX ORDER — FERROUS SULFATE 325(65) MG
TABLET ORAL
Qty: 28 TABLET | Refills: 97 | Status: SHIPPED | OUTPATIENT
Start: 2020-01-01 | End: 2020-01-01

## 2020-01-01 RX ORDER — DULOXETIN HYDROCHLORIDE 30 MG/1
60 CAPSULE, DELAYED RELEASE ORAL DAILY
Status: DISCONTINUED | OUTPATIENT
Start: 2020-01-01 | End: 2020-01-01 | Stop reason: HOSPADM

## 2020-01-01 RX ORDER — ACETAMINOPHEN 650 MG/1
650 SUPPOSITORY RECTAL EVERY 4 HOURS PRN
COMMUNITY
Start: 2020-01-01 | End: 2020-01-01

## 2020-01-01 RX ORDER — OXYCODONE HCL 10 MG/1
10 TABLET, FILM COATED, EXTENDED RELEASE ORAL EVERY 12 HOURS
Status: DISCONTINUED | OUTPATIENT
Start: 2020-01-01 | End: 2020-01-01 | Stop reason: HOSPADM

## 2020-01-01 RX ORDER — PSEUDOEPHED/ACETAMINOPH/DIPHEN 30MG-500MG
TABLET ORAL
Qty: 186 TABLET | Status: ON HOLD | OUTPATIENT
Start: 2020-01-01 | End: 2020-01-01

## 2020-01-01 RX ORDER — AMOXICILLIN 250 MG
2 CAPSULE ORAL 2 TIMES DAILY
Qty: 60 TABLET | Refills: 98 | COMMUNITY
Start: 2020-01-01 | End: 2020-01-01

## 2020-01-01 RX ORDER — PROCHLORPERAZINE 25 MG
12.5 SUPPOSITORY, RECTAL RECTAL EVERY 12 HOURS PRN
Status: DISCONTINUED | OUTPATIENT
Start: 2020-01-01 | End: 2020-01-01 | Stop reason: HOSPADM

## 2020-01-01 RX ORDER — FUROSEMIDE 20 MG
TABLET ORAL
Qty: 28 TABLET | Refills: 97 | Status: SHIPPED | OUTPATIENT
Start: 2020-01-01 | End: 2020-01-01

## 2020-01-01 RX ORDER — ONDANSETRON 4 MG/1
4 TABLET, FILM COATED ORAL EVERY 6 HOURS PRN
COMMUNITY
End: 2020-01-01

## 2020-01-01 RX ORDER — CALCIUM CARBONATE 500 MG/1
500 TABLET, CHEWABLE ORAL 3 TIMES DAILY PRN
Status: DISCONTINUED | OUTPATIENT
Start: 2020-01-01 | End: 2020-01-01 | Stop reason: HOSPADM

## 2020-01-01 RX ORDER — OMEPRAZOLE 40 MG/1
CAPSULE, DELAYED RELEASE ORAL
Qty: 28 CAPSULE | Refills: 97 | Status: SHIPPED | OUTPATIENT
Start: 2020-01-01 | End: 2020-01-01

## 2020-01-01 RX ORDER — PREDNISONE 5 MG/1
5 TABLET ORAL DAILY
Status: DISCONTINUED | OUTPATIENT
Start: 2020-01-01 | End: 2020-01-01

## 2020-01-01 RX ORDER — NYSTATIN 100000 [USP'U]/G
POWDER TOPICAL 3 TIMES DAILY
Qty: 1 BOTTLE | Refills: 97 | Status: SHIPPED | OUTPATIENT
Start: 2020-01-01

## 2020-01-01 RX ORDER — LIDOCAINE 4 G/G
1 PATCH TOPICAL
Status: DISCONTINUED | OUTPATIENT
Start: 2020-01-01 | End: 2020-01-01 | Stop reason: HOSPADM

## 2020-01-01 RX ORDER — LACTOBACILLUS RHAMNOSUS GG 10B CELL
1 CAPSULE ORAL 2 TIMES DAILY
Qty: 28 CAPSULE | Refills: 0
Start: 2020-01-01 | End: 2020-01-01

## 2020-01-01 RX ORDER — FUROSEMIDE 20 MG
20 TABLET ORAL DAILY
Status: DISCONTINUED | OUTPATIENT
Start: 2020-01-01 | End: 2020-01-01 | Stop reason: HOSPADM

## 2020-01-01 RX ORDER — CHOLECALCIFEROL (VITAMIN D3) 25 MCG
TABLET ORAL
Qty: 28 TABLET | Refills: 97 | Status: SHIPPED | OUTPATIENT
Start: 2020-01-01 | End: 2020-01-01

## 2020-01-01 RX ORDER — CEFTRIAXONE SODIUM 1 G/50ML
1 INJECTION, SOLUTION INTRAVENOUS ONCE
Status: COMPLETED | OUTPATIENT
Start: 2020-01-01 | End: 2020-01-01

## 2020-01-01 RX ORDER — ASPIRIN 81 MG/1
81 TABLET, CHEWABLE ORAL DAILY
Status: DISCONTINUED | OUTPATIENT
Start: 2020-01-01 | End: 2020-01-01

## 2020-01-01 RX ORDER — NIFEDIPINE 60 MG/1
TABLET, EXTENDED RELEASE ORAL
Qty: 28 TABLET | Refills: 97 | Status: SHIPPED | OUTPATIENT
Start: 2020-01-01 | End: 2020-01-01

## 2020-01-01 RX ORDER — HYDROMORPHONE HYDROCHLORIDE 2 MG/1
TABLET ORAL
Qty: 12 TABLET | Refills: 0 | Status: SHIPPED | DISCHARGE
Start: 2020-01-01 | End: 2020-01-01

## 2020-01-01 RX ORDER — ONDANSETRON 2 MG/ML
4 INJECTION INTRAMUSCULAR; INTRAVENOUS
Status: DISCONTINUED | OUTPATIENT
Start: 2020-01-01 | End: 2020-01-01

## 2020-01-01 RX ORDER — DONEPEZIL HYDROCHLORIDE 5 MG/1
TABLET, FILM COATED ORAL
Qty: 30 TABLET | Refills: 0 | Status: SHIPPED | OUTPATIENT
Start: 2020-01-01 | End: 2020-01-01

## 2020-01-01 RX ORDER — HYDROMORPHONE HYDROCHLORIDE 2 MG/1
TABLET ORAL
Qty: 30 TABLET | Refills: 0 | Status: ON HOLD | OUTPATIENT
Start: 2020-01-01 | End: 2020-01-01

## 2020-01-01 RX ORDER — ATORVASTATIN CALCIUM 40 MG/1
TABLET, FILM COATED ORAL
Qty: 30 TABLET | Refills: 0 | Status: SHIPPED | OUTPATIENT
Start: 2020-01-01 | End: 2020-01-01

## 2020-01-01 RX ORDER — WARFARIN SODIUM 1 MG/1
3 TABLET ORAL DAILY
Qty: 90 TABLET | Refills: 11 | Status: SHIPPED | OUTPATIENT
Start: 2020-01-01 | End: 2020-01-01

## 2020-01-01 RX ORDER — GABAPENTIN 100 MG/1
100 CAPSULE ORAL DAILY
COMMUNITY
End: 2020-01-01

## 2020-01-01 RX ORDER — WARFARIN SODIUM 1 MG/1
TABLET ORAL
Qty: 90 TABLET | Refills: 1 | Status: SHIPPED | OUTPATIENT
Start: 2020-01-01 | End: 2020-01-01 | Stop reason: SINTOL

## 2020-01-01 RX ORDER — LIDOCAINE 40 MG/G
CREAM TOPICAL
Status: DISCONTINUED | OUTPATIENT
Start: 2020-01-01 | End: 2020-01-01 | Stop reason: HOSPADM

## 2020-01-01 RX ORDER — ONDANSETRON 4 MG/1
TABLET, FILM COATED ORAL
Qty: 30 TABLET | Refills: 97 | Status: SHIPPED | OUTPATIENT
Start: 2020-01-01

## 2020-01-01 RX ORDER — POLYETHYLENE GLYCOL 3350 17 G/17G
17 POWDER, FOR SOLUTION ORAL DAILY PRN
Status: DISCONTINUED | OUTPATIENT
Start: 2020-01-01 | End: 2020-01-01 | Stop reason: HOSPADM

## 2020-01-01 RX ORDER — ACETAMINOPHEN 500 MG
1000 TABLET ORAL 3 TIMES DAILY
COMMUNITY
End: 2020-01-01

## 2020-01-01 RX ORDER — HYDROMORPHONE HYDROCHLORIDE 2 MG/1
2 TABLET ORAL 2 TIMES DAILY PRN
COMMUNITY
End: 2020-01-01

## 2020-01-01 RX ORDER — DULOXETIN HYDROCHLORIDE 30 MG/1
30 CAPSULE, DELAYED RELEASE ORAL AT BEDTIME
COMMUNITY
End: 2020-01-01

## 2020-01-01 RX ORDER — HYDROMORPHONE HYDROCHLORIDE 1 MG/ML
1 SOLUTION ORAL
Refills: 0 | COMMUNITY
Start: 2020-01-01

## 2020-01-01 RX ORDER — GUAIFENESIN 600 MG/1
600 TABLET, EXTENDED RELEASE ORAL 2 TIMES DAILY
Qty: 60 TABLET | Refills: 0 | Status: SHIPPED | OUTPATIENT
Start: 2020-01-01 | End: 2020-01-01

## 2020-01-01 RX ORDER — GABAPENTIN 100 MG/1
200 CAPSULE ORAL DAILY
Status: DISCONTINUED | OUTPATIENT
Start: 2020-01-01 | End: 2020-01-01

## 2020-01-01 RX ORDER — OMEPRAZOLE 40 MG/1
CAPSULE, DELAYED RELEASE ORAL
Qty: 28 CAPSULE | Status: SHIPPED | OUTPATIENT
Start: 2020-01-01 | End: 2020-01-01

## 2020-01-01 RX ORDER — BISACODYL 10 MG
10 SUPPOSITORY, RECTAL RECTAL DAILY PRN
COMMUNITY

## 2020-01-01 RX ORDER — DULOXETIN HYDROCHLORIDE 30 MG/1
CAPSULE, DELAYED RELEASE ORAL
Qty: 30 CAPSULE | Refills: 11 | Status: ON HOLD | OUTPATIENT
Start: 2020-01-01 | End: 2020-01-01

## 2020-01-01 RX ORDER — ACETAMINOPHEN 650 MG/1
650 SUPPOSITORY RECTAL EVERY 4 HOURS PRN
COMMUNITY
Start: 2020-01-01

## 2020-01-01 RX ORDER — LISINOPRIL 20 MG/1
20 TABLET ORAL DAILY
Qty: 30 TABLET | Refills: 11 | Status: SHIPPED | OUTPATIENT
Start: 2020-01-01 | End: 2020-01-01

## 2020-01-01 RX ORDER — LEVALBUTEROL INHALATION SOLUTION 1.25 MG/3ML
1 SOLUTION RESPIRATORY (INHALATION) EVERY 4 HOURS PRN
Status: DISCONTINUED | OUTPATIENT
Start: 2020-01-01 | End: 2020-01-01

## 2020-01-01 RX ORDER — ATROPINE SULFATE 10 MG/ML
1-2 SOLUTION/ DROPS OPHTHALMIC
COMMUNITY
Start: 2020-01-01

## 2020-01-01 RX ADMIN — FUROSEMIDE 20 MG: 20 TABLET ORAL at 09:07

## 2020-01-01 RX ADMIN — OMEPRAZOLE 40 MG: 20 CAPSULE, DELAYED RELEASE ORAL at 09:05

## 2020-01-01 RX ADMIN — IPRATROPIUM BROMIDE AND ALBUTEROL SULFATE 3 ML: .5; 3 SOLUTION RESPIRATORY (INHALATION) at 00:46

## 2020-01-01 RX ADMIN — FLUTICASONE FUROATE AND VILANTEROL TRIFENATATE 1 PUFF: 100; 25 POWDER RESPIRATORY (INHALATION) at 09:08

## 2020-01-01 RX ADMIN — ACETAMINOPHEN 1000 MG: 500 TABLET, FILM COATED ORAL at 17:23

## 2020-01-01 RX ADMIN — FUROSEMIDE 20 MG: 20 TABLET ORAL at 09:05

## 2020-01-01 RX ADMIN — LEVALBUTEROL HYDROCHLORIDE 1.25 MG: 1.25 SOLUTION RESPIRATORY (INHALATION) at 16:02

## 2020-01-01 RX ADMIN — MICONAZOLE NITRATE: 20 POWDER TOPICAL at 21:20

## 2020-01-01 RX ADMIN — FLUTICASONE FUROATE AND VILANTEROL TRIFENATATE 1 PUFF: 100; 25 POWDER RESPIRATORY (INHALATION) at 08:13

## 2020-01-01 RX ADMIN — OMEPRAZOLE 40 MG: 20 CAPSULE, DELAYED RELEASE ORAL at 08:11

## 2020-01-01 RX ADMIN — WARFARIN SODIUM 3 MG: 3 TABLET ORAL at 18:00

## 2020-01-01 RX ADMIN — DULOXETINE HYDROCHLORIDE 60 MG: 30 CAPSULE, DELAYED RELEASE ORAL at 08:12

## 2020-01-01 RX ADMIN — OMEPRAZOLE 40 MG: 20 CAPSULE, DELAYED RELEASE ORAL at 09:06

## 2020-01-01 RX ADMIN — DULOXETINE HYDROCHLORIDE 30 MG: 30 CAPSULE, DELAYED RELEASE ORAL at 21:19

## 2020-01-01 RX ADMIN — FLUTICASONE FUROATE AND VILANTEROL TRIFENATATE 1 PUFF: 100; 25 POWDER RESPIRATORY (INHALATION) at 09:09

## 2020-01-01 RX ADMIN — FUROSEMIDE 20 MG: 20 TABLET ORAL at 08:28

## 2020-01-01 RX ADMIN — FERROUS SULFATE TAB 325 MG (65 MG ELEMENTAL FE) 325 MG: 325 (65 FE) TAB at 09:07

## 2020-01-01 RX ADMIN — LISINOPRIL 20 MG: 20 TABLET ORAL at 09:05

## 2020-01-01 RX ADMIN — Medication 1 MG: at 00:17

## 2020-01-01 RX ADMIN — ACETAMINOPHEN 1000 MG: 500 TABLET, FILM COATED ORAL at 08:49

## 2020-01-01 RX ADMIN — SODIUM CHLORIDE 1000 ML: 9 INJECTION, SOLUTION INTRAVENOUS at 12:56

## 2020-01-01 RX ADMIN — LEVALBUTEROL HYDROCHLORIDE 1.25 MG: 1.25 SOLUTION RESPIRATORY (INHALATION) at 07:28

## 2020-01-01 RX ADMIN — ATORVASTATIN CALCIUM 40 MG: 20 TABLET, FILM COATED ORAL at 17:23

## 2020-01-01 RX ADMIN — ACETAMINOPHEN 1000 MG: 500 TABLET, FILM COATED ORAL at 09:06

## 2020-01-01 RX ADMIN — ENOXAPARIN SODIUM 40 MG: 40 INJECTION SUBCUTANEOUS at 11:11

## 2020-01-01 RX ADMIN — ACETAMINOPHEN 1000 MG: 500 TABLET, FILM COATED ORAL at 20:21

## 2020-01-01 RX ADMIN — DULOXETINE HYDROCHLORIDE 30 MG: 30 CAPSULE, DELAYED RELEASE ORAL at 21:36

## 2020-01-01 RX ADMIN — GABAPENTIN 200 MG: 100 CAPSULE ORAL at 08:28

## 2020-01-01 RX ADMIN — OXYCODONE HYDROCHLORIDE 10 MG: 10 TABLET, FILM COATED, EXTENDED RELEASE ORAL at 10:12

## 2020-01-01 RX ADMIN — FLUTICASONE FUROATE AND VILANTEROL TRIFENATATE 1 PUFF: 100; 25 POWDER RESPIRATORY (INHALATION) at 16:19

## 2020-01-01 RX ADMIN — DULOXETINE HYDROCHLORIDE 30 MG: 30 CAPSULE, DELAYED RELEASE ORAL at 21:41

## 2020-01-01 RX ADMIN — ACETAMINOPHEN 1000 MG: 500 TABLET, FILM COATED ORAL at 14:03

## 2020-01-01 RX ADMIN — LEVALBUTEROL HYDROCHLORIDE 1.25 MG: 1.25 SOLUTION RESPIRATORY (INHALATION) at 12:31

## 2020-01-01 RX ADMIN — LISINOPRIL 20 MG: 20 TABLET ORAL at 08:11

## 2020-01-01 RX ADMIN — FERROUS SULFATE TAB 325 MG (65 MG ELEMENTAL FE) 325 MG: 325 (65 FE) TAB at 12:14

## 2020-01-01 RX ADMIN — PREDNISONE 5 MG: 5 TABLET ORAL at 12:14

## 2020-01-01 RX ADMIN — HYDROMORPHONE HYDROCHLORIDE 2 MG: 2 TABLET ORAL at 02:58

## 2020-01-01 RX ADMIN — WARFARIN SODIUM 3 MG: 3 TABLET ORAL at 17:30

## 2020-01-01 RX ADMIN — LISINOPRIL 20 MG: 20 TABLET ORAL at 08:28

## 2020-01-01 RX ADMIN — ACETAMINOPHEN 1000 MG: 500 TABLET, FILM COATED ORAL at 20:09

## 2020-01-01 RX ADMIN — AZITHROMYCIN MONOHYDRATE 500 MG: 500 INJECTION, POWDER, LYOPHILIZED, FOR SOLUTION INTRAVENOUS at 11:13

## 2020-01-01 RX ADMIN — DULOXETINE HYDROCHLORIDE 60 MG: 30 CAPSULE, DELAYED RELEASE ORAL at 09:06

## 2020-01-01 RX ADMIN — ONDANSETRON 4 MG: 2 INJECTION INTRAMUSCULAR; INTRAVENOUS at 12:01

## 2020-01-01 RX ADMIN — IPRATROPIUM BROMIDE AND ALBUTEROL SULFATE 3 ML: .5; 3 SOLUTION RESPIRATORY (INHALATION) at 22:04

## 2020-01-01 RX ADMIN — MICONAZOLE NITRATE: 20 POWDER TOPICAL at 09:06

## 2020-01-01 RX ADMIN — HYDROMORPHONE HYDROCHLORIDE 2 MG: 2 TABLET ORAL at 16:26

## 2020-01-01 RX ADMIN — ACETAMINOPHEN 1000 MG: 500 TABLET, FILM COATED ORAL at 08:27

## 2020-01-01 RX ADMIN — ACETAMINOPHEN 650 MG: 325 TABLET, FILM COATED ORAL at 06:09

## 2020-01-01 RX ADMIN — MICONAZOLE NITRATE: 20 POWDER TOPICAL at 14:03

## 2020-01-01 RX ADMIN — MICONAZOLE NITRATE: 20 POWDER TOPICAL at 21:25

## 2020-01-01 RX ADMIN — DULOXETINE HYDROCHLORIDE 60 MG: 30 CAPSULE, DELAYED RELEASE ORAL at 08:50

## 2020-01-01 RX ADMIN — ACETAMINOPHEN 1000 MG: 500 TABLET, FILM COATED ORAL at 19:21

## 2020-01-01 RX ADMIN — ACETAMINOPHEN 1000 MG: 500 TABLET, FILM COATED ORAL at 08:12

## 2020-01-01 RX ADMIN — DULOXETINE HYDROCHLORIDE 30 MG: 30 CAPSULE, DELAYED RELEASE ORAL at 22:27

## 2020-01-01 RX ADMIN — POLYETHYLENE GLYCOL 3350 17 G: 17 POWDER, FOR SOLUTION ORAL at 11:56

## 2020-01-01 RX ADMIN — ATORVASTATIN CALCIUM 40 MG: 20 TABLET, FILM COATED ORAL at 17:41

## 2020-01-01 RX ADMIN — PREDNISONE 40 MG: 20 TABLET ORAL at 22:54

## 2020-01-01 RX ADMIN — ENOXAPARIN SODIUM 40 MG: 40 INJECTION SUBCUTANEOUS at 10:23

## 2020-01-01 RX ADMIN — ONDANSETRON 4 MG: 2 INJECTION INTRAMUSCULAR; INTRAVENOUS at 12:56

## 2020-01-01 RX ADMIN — OXYCODONE HYDROCHLORIDE 10 MG: 10 TABLET, FILM COATED, EXTENDED RELEASE ORAL at 01:52

## 2020-01-01 RX ADMIN — LEVALBUTEROL HYDROCHLORIDE 1.25 MG: 1.25 SOLUTION RESPIRATORY (INHALATION) at 11:29

## 2020-01-01 RX ADMIN — DULOXETINE HYDROCHLORIDE 60 MG: 30 CAPSULE, DELAYED RELEASE ORAL at 09:05

## 2020-01-01 RX ADMIN — DULOXETINE HYDROCHLORIDE 60 MG: 30 CAPSULE, DELAYED RELEASE ORAL at 08:28

## 2020-01-01 RX ADMIN — FERROUS SULFATE TAB 325 MG (65 MG ELEMENTAL FE) 325 MG: 325 (65 FE) TAB at 08:12

## 2020-01-01 RX ADMIN — OMEPRAZOLE 40 MG: 20 CAPSULE, DELAYED RELEASE ORAL at 08:28

## 2020-01-01 RX ADMIN — Medication 1 MG: at 21:40

## 2020-01-01 RX ADMIN — FUROSEMIDE 20 MG: 20 TABLET ORAL at 08:12

## 2020-01-01 RX ADMIN — LIDOCAINE 1 PATCH: 560 PATCH PERCUTANEOUS; TOPICAL; TRANSDERMAL at 08:29

## 2020-01-01 RX ADMIN — HYDROMORPHONE HYDROCHLORIDE 2 MG: 2 TABLET ORAL at 09:13

## 2020-01-01 RX ADMIN — PREDNISONE 5 MG: 5 TABLET ORAL at 09:05

## 2020-01-01 RX ADMIN — WARFARIN SODIUM 3 MG: 3 TABLET ORAL at 17:23

## 2020-01-01 RX ADMIN — FENTANYL CITRATE 25 MCG: 50 INJECTION, SOLUTION INTRAMUSCULAR; INTRAVENOUS at 23:55

## 2020-01-01 RX ADMIN — OMEPRAZOLE 40 MG: 20 CAPSULE, DELAYED RELEASE ORAL at 08:49

## 2020-01-01 RX ADMIN — ATORVASTATIN CALCIUM 40 MG: 20 TABLET, FILM COATED ORAL at 19:21

## 2020-01-01 RX ADMIN — FERROUS SULFATE TAB 325 MG (65 MG ELEMENTAL FE) 325 MG: 325 (65 FE) TAB at 09:05

## 2020-01-01 RX ADMIN — OXYCODONE HYDROCHLORIDE 10 MG: 10 TABLET, FILM COATED, EXTENDED RELEASE ORAL at 21:35

## 2020-01-01 RX ADMIN — NIFEDIPINE 60 MG: 30 TABLET, FILM COATED, EXTENDED RELEASE ORAL at 09:08

## 2020-01-01 RX ADMIN — NIFEDIPINE 60 MG: 30 TABLET, FILM COATED, EXTENDED RELEASE ORAL at 08:29

## 2020-01-01 RX ADMIN — AZITHROMYCIN MONOHYDRATE 500 MG: 500 INJECTION, POWDER, LYOPHILIZED, FOR SOLUTION INTRAVENOUS at 11:14

## 2020-01-01 RX ADMIN — OXYCODONE HYDROCHLORIDE 10 MG: 10 TABLET, FILM COATED, EXTENDED RELEASE ORAL at 11:32

## 2020-01-01 RX ADMIN — IPRATROPIUM BROMIDE AND ALBUTEROL SULFATE 3 ML: .5; 3 SOLUTION RESPIRATORY (INHALATION) at 07:43

## 2020-01-01 RX ADMIN — Medication 1 MG: at 23:01

## 2020-01-01 RX ADMIN — OMEPRAZOLE 40 MG: 20 CAPSULE, DELAYED RELEASE ORAL at 12:14

## 2020-01-01 RX ADMIN — OXYCODONE HYDROCHLORIDE 10 MG: 10 TABLET, FILM COATED, EXTENDED RELEASE ORAL at 12:14

## 2020-01-01 RX ADMIN — LISINOPRIL 20 MG: 20 TABLET ORAL at 09:06

## 2020-01-01 RX ADMIN — OXYCODONE HYDROCHLORIDE 10 MG: 10 TABLET, FILM COATED, EXTENDED RELEASE ORAL at 08:49

## 2020-01-01 RX ADMIN — HYDROMORPHONE HYDROCHLORIDE 2 MG: 2 TABLET ORAL at 12:14

## 2020-01-01 RX ADMIN — AZITHROMYCIN MONOHYDRATE 500 MG: 500 INJECTION, POWDER, LYOPHILIZED, FOR SOLUTION INTRAVENOUS at 19:33

## 2020-01-01 RX ADMIN — PREDNISONE 5 MG: 5 TABLET ORAL at 08:11

## 2020-01-01 RX ADMIN — FENTANYL CITRATE 25 MCG: 50 INJECTION, SOLUTION INTRAMUSCULAR; INTRAVENOUS at 01:44

## 2020-01-01 RX ADMIN — DULOXETINE HYDROCHLORIDE 30 MG: 30 CAPSULE, DELAYED RELEASE ORAL at 21:24

## 2020-01-01 RX ADMIN — HYDROMORPHONE HYDROCHLORIDE 2 MG: 2 TABLET ORAL at 18:00

## 2020-01-01 RX ADMIN — OXYCODONE HYDROCHLORIDE 10 MG: 10 TABLET, FILM COATED, EXTENDED RELEASE ORAL at 21:19

## 2020-01-01 RX ADMIN — LIDOCAINE 1 PATCH: 560 PATCH PERCUTANEOUS; TOPICAL; TRANSDERMAL at 08:57

## 2020-01-01 RX ADMIN — LEVALBUTEROL HYDROCHLORIDE 1.25 MG: 1.25 SOLUTION RESPIRATORY (INHALATION) at 19:34

## 2020-01-01 RX ADMIN — MICONAZOLE NITRATE: 20 POWDER TOPICAL at 20:21

## 2020-01-01 RX ADMIN — CEFTRIAXONE SODIUM 1 G: 1 INJECTION, SOLUTION INTRAVENOUS at 17:16

## 2020-01-01 RX ADMIN — MICONAZOLE NITRATE: 20 POWDER TOPICAL at 08:13

## 2020-01-01 RX ADMIN — FUROSEMIDE 20 MG: 20 TABLET ORAL at 08:50

## 2020-01-01 RX ADMIN — LISINOPRIL 20 MG: 20 TABLET ORAL at 08:49

## 2020-01-01 RX ADMIN — OXYCODONE HYDROCHLORIDE 10 MG: 10 TABLET, FILM COATED, EXTENDED RELEASE ORAL at 11:36

## 2020-01-01 RX ADMIN — IOPAMIDOL 91 ML: 755 INJECTION, SOLUTION INTRAVENOUS at 13:25

## 2020-01-01 RX ADMIN — GABAPENTIN 200 MG: 100 CAPSULE ORAL at 08:49

## 2020-01-01 RX ADMIN — OXYCODONE HYDROCHLORIDE 10 MG: 10 TABLET, FILM COATED, EXTENDED RELEASE ORAL at 00:15

## 2020-01-01 RX ADMIN — WARFARIN SODIUM 2.5 MG: 2.5 TABLET ORAL at 17:41

## 2020-01-01 RX ADMIN — SENNOSIDES AND DOCUSATE SODIUM 2 TABLET: 8.6; 5 TABLET ORAL at 09:05

## 2020-01-01 RX ADMIN — FERROUS SULFATE TAB 325 MG (65 MG ELEMENTAL FE) 325 MG: 325 (65 FE) TAB at 08:50

## 2020-01-01 RX ADMIN — ACETAMINOPHEN 1000 MG: 500 TABLET, FILM COATED ORAL at 14:04

## 2020-01-01 RX ADMIN — ASPIRIN 81 MG 81 MG: 81 TABLET ORAL at 08:28

## 2020-01-01 RX ADMIN — HYDROMORPHONE HYDROCHLORIDE 2 MG: 2 TABLET ORAL at 22:27

## 2020-01-01 RX ADMIN — ACETAMINOPHEN 1000 MG: 500 TABLET, FILM COATED ORAL at 16:20

## 2020-01-01 RX ADMIN — FLUTICASONE FUROATE AND VILANTEROL TRIFENATATE 1 PUFF: 100; 25 POWDER RESPIRATORY (INHALATION) at 08:52

## 2020-01-01 RX ADMIN — HYDROMORPHONE HYDROCHLORIDE 2 MG: 2 TABLET ORAL at 14:17

## 2020-01-01 RX ADMIN — OXYCODONE HYDROCHLORIDE 10 MG: 10 TABLET, FILM COATED, EXTENDED RELEASE ORAL at 23:49

## 2020-01-01 RX ADMIN — NIFEDIPINE 60 MG: 30 TABLET, FILM COATED, EXTENDED RELEASE ORAL at 09:07

## 2020-01-01 RX ADMIN — HYDROMORPHONE HYDROCHLORIDE 2 MG: 2 TABLET ORAL at 12:55

## 2020-01-01 RX ADMIN — ACETAMINOPHEN 1000 MG: 500 TABLET, FILM COATED ORAL at 09:05

## 2020-01-01 RX ADMIN — ACETAMINOPHEN 1000 MG: 500 TABLET, FILM COATED ORAL at 21:19

## 2020-01-01 RX ADMIN — MICONAZOLE NITRATE: 20 POWDER TOPICAL at 08:31

## 2020-01-01 RX ADMIN — NIFEDIPINE 60 MG: 30 TABLET, FILM COATED, EXTENDED RELEASE ORAL at 16:20

## 2020-01-01 RX ADMIN — FLUTICASONE FUROATE AND VILANTEROL TRIFENATATE 1 PUFF: 100; 25 POWDER RESPIRATORY (INHALATION) at 08:27

## 2020-01-01 RX ADMIN — MICONAZOLE NITRATE: 20 POWDER TOPICAL at 08:52

## 2020-01-01 RX ADMIN — OXYCODONE HYDROCHLORIDE 10 MG: 10 TABLET, FILM COATED, EXTENDED RELEASE ORAL at 11:56

## 2020-01-01 RX ADMIN — PREDNISONE 40 MG: 20 TABLET ORAL at 08:49

## 2020-01-01 RX ADMIN — SODIUM CHLORIDE 63 ML: 9 INJECTION, SOLUTION INTRAVENOUS at 13:25

## 2020-01-01 RX ADMIN — ATORVASTATIN CALCIUM 40 MG: 20 TABLET, FILM COATED ORAL at 17:29

## 2020-01-01 RX ADMIN — HYDROMORPHONE HYDROCHLORIDE 2 MG: 2 TABLET ORAL at 21:41

## 2020-01-01 RX ADMIN — LEVALBUTEROL HYDROCHLORIDE 1.25 MG: 1.25 SOLUTION RESPIRATORY (INHALATION) at 23:33

## 2020-01-01 RX ADMIN — ATORVASTATIN CALCIUM 40 MG: 20 TABLET, FILM COATED ORAL at 18:00

## 2020-01-01 RX ADMIN — NIFEDIPINE 60 MG: 30 TABLET, FILM COATED, EXTENDED RELEASE ORAL at 08:50

## 2020-01-01 RX ADMIN — NIFEDIPINE 60 MG: 30 TABLET, FILM COATED, EXTENDED RELEASE ORAL at 08:11

## 2020-01-01 RX ADMIN — ACETAMINOPHEN 1000 MG: 500 TABLET, FILM COATED ORAL at 13:54

## 2020-01-01 RX ADMIN — HYDROMORPHONE HYDROCHLORIDE 2 MG: 2 TABLET ORAL at 08:12

## 2020-01-01 RX ADMIN — SODIUM CHLORIDE, POTASSIUM CHLORIDE, SODIUM LACTATE AND CALCIUM CHLORIDE 500 ML: 600; 310; 30; 20 INJECTION, SOLUTION INTRAVENOUS at 12:08

## 2020-01-01 RX ADMIN — MICONAZOLE NITRATE: 20 POWDER TOPICAL at 09:07

## 2020-01-01 RX ADMIN — PREDNISONE 5 MG: 5 TABLET ORAL at 09:06

## 2020-01-01 RX ADMIN — SENNOSIDES AND DOCUSATE SODIUM 2 TABLET: 8.6; 5 TABLET ORAL at 17:29

## 2020-01-01 RX ADMIN — MICONAZOLE NITRATE: 20 POWDER TOPICAL at 13:19

## 2020-01-01 RX ADMIN — PREDNISONE 40 MG: 20 TABLET ORAL at 08:28

## 2020-01-01 ASSESSMENT — ACTIVITIES OF DAILY LIVING (ADL)
ADLS_ACUITY_SCORE: 31
ADLS_ACUITY_SCORE: 25
ADLS_ACUITY_SCORE: 24
TOILETING: 1-->ASSISTIVE EQUIPMENT
ADLS_ACUITY_SCORE: 24
ADLS_ACUITY_SCORE: 25
ADLS_ACUITY_SCORE: 25
WHICH_OF_THE_ABOVE_FUNCTIONAL_RISKS_HAD_A_RECENT_ONSET_OR_CHANGE?: AMBULATION;TRANSFERRING;TOILETING
RETIRED_EATING: 0-->INDEPENDENT
IADL_COMMENTS: FACILITY COMPLETES
ADLS_ACUITY_SCORE: 24
ADLS_ACUITY_SCORE: 24
TRANSFERRING: 1-->ASSISTIVE EQUIPMENT
ADLS_ACUITY_SCORE: 25
ADLS_ACUITY_SCORE: 30
ADLS_ACUITY_SCORE: 27
DEPENDENT_IADLS:: CLEANING;COOKING;LAUNDRY;SHOPPING;MEAL PREPARATION;MEDICATION MANAGEMENT;MONEY MANAGEMENT;TRANSPORTATION;INCONTINENCE
ADLS_ACUITY_SCORE: 30
ADLS_ACUITY_SCORE: 30
COGNITION: 0 - NO COGNITION ISSUES REPORTED
SWALLOWING: 0-->SWALLOWS FOODS/LIQUIDS WITHOUT DIFFICULTY
AMBULATION: 1-->ASSISTIVE EQUIPMENT
ADLS_ACUITY_SCORE: 25
ADLS_ACUITY_SCORE: 24
DRESS: 3-->ASSISTIVE EQUIPMENT AND PERSON
BATHING: 3-->ASSISTIVE EQUIPMENT AND PERSON
ADLS_ACUITY_SCORE: 27
ADLS_ACUITY_SCORE: 24
ADLS_ACUITY_SCORE: 29
ADLS_ACUITY_SCORE: 25
RETIRED_COMMUNICATION: 0-->UNDERSTANDS/COMMUNICATES WITHOUT DIFFICULTY
ADLS_ACUITY_SCORE: 29
ADLS_ACUITY_SCORE: 30
ADLS_ACUITY_SCORE: 25
ADLS_ACUITY_SCORE: 24
ADLS_ACUITY_SCORE: 29
ADLS_ACUITY_SCORE: 29
ADLS_ACUITY_SCORE: 26
ADLS_ACUITY_SCORE: 26
ADLS_ACUITY_SCORE: 24
ADLS_ACUITY_SCORE: 25
FALL_HISTORY_WITHIN_LAST_SIX_MONTHS: NO
ADLS_ACUITY_SCORE: 25

## 2020-01-01 ASSESSMENT — ENCOUNTER SYMPTOMS
CARDIOVASCULAR NEGATIVE: 1
CONSTIPATION: 0
ALLERGIC/IMMUNOLOGIC NEGATIVE: 1
DIAPHORESIS: 0
SORE THROAT: 0
COUGH: 0
CHILLS: 0
RESPIRATORY NEGATIVE: 1
ENDOCRINE NEGATIVE: 1
FREQUENCY: 0
SHORTNESS OF BREATH: 1
NAUSEA: 0
WOUND: 1
DIARRHEA: 0
NEUROLOGICAL NEGATIVE: 1
BLOOD IN STOOL: 0
VOMITING: 0
DYSURIA: 0
ABDOMINAL PAIN: 0
EYES NEGATIVE: 1
PALPITATIONS: 0
FEVER: 0
PSYCHIATRIC NEGATIVE: 1
WHEEZING: 0
BACK PAIN: 1
BRUISES/BLEEDS EASILY: 1
HEADACHES: 0
SINUS PRESSURE: 0

## 2020-01-01 ASSESSMENT — MIFFLIN-ST. JEOR
SCORE: 1244.13
SCORE: 1258.71
SCORE: 1249.13
SCORE: 1242.38
SCORE: 1257.13
SCORE: 1252.36
SCORE: 1188.4
SCORE: 1281.13
SCORE: 1246.13
SCORE: 1166.17
SCORE: 1175.24
SCORE: 1245.13
SCORE: 1229.13
SCORE: 1261.43

## 2020-01-01 ASSESSMENT — PAIN SCALES - GENERAL
PAINLEVEL: NO PAIN (0)
PAINLEVEL: EXTREME PAIN (8)

## 2020-01-06 NOTE — PROGRESS NOTES
"[facility]  [place] Medical Record Number:  [id number]  Place of Service where encounter took place:  [facility] ([facility]) [[id number]]  Chief Complaint   Patient presents with     RECHECK       HPI:    Jazmin Clifton  is a 87 year old (12/30/1932), who is being seen today for an episodic care visit.  HPI information obtained from: facility chart records, facility staff, patient report and [place] Epic chart review. Today's concern is:     Abdominal pain, right lower quadrant  Loose stools  RSV infection  Chronic obstructive pulmonary disease, unspecified COPD type (H)  Chronic diastolic congestive heart failure (H)  Essential hypertension  Chronic left shoulder pain  Chronic pain syndrome   Patient recently hospitalized with SOB, found to have RSV infection, COPD exacerbation. She was treated on a prednisone taper. Had been briefly treated with ceftriaxone and azithromycin. Today is last day of steroid taper, tomorrow will resume PTA 5mg prednisone for shoulder pain. She states her breathing is much better. Continues to have a cough, but mostly in the morning when she first gets up.     Complaint today is RLQ abdominal pain, states it has been on and off for the past 3 days. Reports she has also had some loose stools for the past 3 days. She states they are black, but staff have told her they are dark brown. She has been on an iron supplement - she states she stopped taking that while she was in the hospital because she thinks it was causing her loose stools, but nursing reports she has taken 9 doses since she returned to [facility]. Reports stool was like a \"mud pie\" today. She does have recent history of C.diff. She reports she did have nausea with vomiting after coughing episode this AM - states she vomited up phlegm afterwards. She denies any indigestion, has been able to eat well. Does have umbilical hernia. She has been afebrile.     Past Medical and Surgical History reviewed in " Epic today.    MEDICATIONS:  Current Outpatient Medications   Medication Sig Dispense Refill     acetaminophen (TYLENOL) 500 MG tablet Take 1,000 mg by mouth 3 times daily       aspirin (ASA) 81 MG chewable tablet Take 1 tablet (81 mg) by mouth daily       atorvastatin (LIPITOR) 40 MG tablet TAKE 1 TABLET BY MOUTH ONCE DAILY 30 tablet 98     benzonatate (TESSALON) 100 MG capsule Take 1 capsule (100 mg) by mouth 3 times daily as needed for cough 25 capsule 0     Blood Glucose Monitoring Suppl CORY 2 times daily Call nurse for further directions if blood glucose is less than 80 or greater than 250       BREO ELLIPTA 100-25 MCG/INH inhaler INHALE 1 PUFF BY MOUTH ONCE DAILY 1 Inhaler 11     calcium carbonate (TUMS) 500 MG chewable tablet Take 1 tablet (500 mg) by mouth 3 times daily       DOK PLUS 50-8.6 MG tablet TAKE 1 TABLET BY MOUTH TWICE DAILY 60 tablet 98     DONEPEZIL HCL PO Take 5 mg by mouth At Bedtime        DULoxetine (CYMBALTA) 30 MG capsule Take 1 capsule (30 mg) by mouth At Bedtime       DULoxetine (CYMBALTA) 60 MG capsule Take 1 capsule (60 mg) by mouth every morning       Emollient (MOISTURIZING LOTION EX) Externally apply topically 2 times daily Bilateral lower extremeties       fluticasone (FLONASE) 50 MCG/ACT nasal spray Spray 1 spray into both nostrils daily       furosemide (LASIX) 20 MG tablet TAKE 1 TABLET BY MOUTH ONCE DAILY 30 tablet 98     gabapentin (NEURONTIN) 300 MG capsule TAKE 1 CAPSULE BY MOUTH TWICE DAILY 60 capsule 98     ipratropium - albuterol 0.5 mg/2.5 mg/3 mL (DUONEB) 0.5-2.5 (3) MG/3ML neb solution Take 1 vial (3 mLs) by nebulization 4 times daily for 14 days 168 mL 0     levalbuterol (XOPENEX) 1.25 MG/3ML neb solution Take 1 ampule by nebulization every 4 hours as needed for shortness of breath / dyspnea or wheezing And every 4 hours PRN       Lidocaine (LIDOCARE) 4 % Patch Place 1 patch onto the skin daily To desired area (shoulder or hip). On for 12hrs, off for 12hrs 30 patch  11     lisinopril (PRINIVIL/ZESTRIL) 20 MG tablet Take 1 tablet (20 mg) by mouth daily 30 tablet 11     NIFEdipine ER (ADALAT CC) 60 MG 24 hr tablet Take 60 mg by mouth daily        OMEPRAZOLE PO Take 40 mg by mouth every morning       oxyCODONE (OXYCONTIN) 10 MG 12 hr tablet Take 1 tablet (10 mg) by mouth every 12 hours 60 tablet 0     predniSONE (DELTASONE) 10 MG tablet Take 3 daily for 3 days, then 2 daily for 3 days, then 1 daily for 3 days--then restart the 5 mg tabs that you have been taking daily for shoulder pain. 18 tablet 0     tiZANidine (ZANAFLEX) 2 MG tablet Take 2 mg by mouth every 8 hours as needed        VITAMIN D3 1000 units tablet TAKE 1 TABLET BY MOUTH ONCE DAILY 31 tablet 98         REVIEW OF SYSTEMS:  4 point ROS including Respiratory, CV, GI and , other than that noted in the HPI,  is negative    Objective:  /78   Pulse 75   Temp 97.9  F (36.6  C)   Resp 16   Wt 85.3 kg (188 lb)   SpO2 93%   BMI 30.34 kg/m    Exam:  GENERAL APPEARANCE:  Alert, in no distress, oriented, cooperative  RESP:  respiratory effort and palpation of chest normal, lungs clear to auscultation , no respiratory distress  CV:  Palpation and auscultation of heart done , regular rate and rhythm, no murmur, rub, or gallop, peripheral edema 1-2+ in BLE, wraps in place  ABDOMEN:  moderate RLQ tenderness, mild gaurding, no rebound, umbilical hernis, + BS, non-acute abdomen  M/S:   generalized weakness, unsteady gait, decreased ROM to left shoulder  SKIN:  Inspection of skin and subcutaneous tissue baseline  NEURO:   Cranial nerves 2-12 are normal tested and grossly at patient's baseline  PSYCH:  oriented X 3, normal insight, judgement and memory, affect and mood normal, anxious at times    Labs:   Recent labs in [facility] reviewed by me today.  and   Most Recent 3 CBC's:  Recent Labs   Lab Test 12/29/19  0712 12/28/19  0608 12/26/19  1207   WBC 6.4 6.8 6.7   HGB 9.6* 9.4* 10.6*   MCV 93 93 95    196 195     Most  Recent 3 BMP's:  Recent Labs   Lab Test 12/29/19  0712 12/27/19  0532 12/26/19  1207    143 144   POTASSIUM 4.2 4.0 3.9   CHLORIDE 110* 109 111*   CO2 31 30 28   BUN 33* 24 26   CR 1.04 0.89 1.07*   ANIONGAP 2* 4 5   BLAIR 9.5 9.2 9.3   GLC 95 134* 202*       ASSESSMENT/PLAN:  (R10.31) Abdominal pain, right lower quadrant  (primary encounter diagnosis)  (R19.5) Loose stools  Comment: Unclear etiology - does not appear to acute abdomen. Tolerating diet. Afebrile. Low suspicions for appendicitis. ? R/t recent loose stools, gas pain?  Plan: Will order abdominal x-ray today, if no acute concerns will order CMP and CBC on next labs day. Will discontinue iron per patient's request. Will continue to monitor. Diet as tolerated.     (B97.4) RSV infection  (J44.9) Chronic obstructive pulmonary disease, unspecified COPD type (H)  Comment: Resolved, continues to have AM cough, but is improving. Complete steroid taper. Discontinued scheduled duonebs as she no longer needed them and does not like how albuterol makes her feel  Plan: Resume prednisone 5mg daily, continue  breo ellipata, PRN levalbuterol nebs, continue tessalon PRN for now.     (I50.32) Chronic diastolic congestive heart failure (H)  (I10) Essential hypertension  Comment: Appears compensated, BP elevated at times but suspect pain and anxiety contributing.   Plan: Will increase lisinopril to 20mg daily, continue ASA, lipitor, furosemide 20mg daily, nifedipine ER 60mg daily, BMP in 2 weeks to monitor for stability.     (M25.512,  G89.29) Chronic left shoulder pain  (G89.4) Chronic pain syndrome  Comment: Pain appears to be at baseline. Follows with pain clinic. Resumes prednisone 5mg tomorrow.   Plan: Continue prednisone, oxycontin, oxycodone 5mg PRN daily, tizanidine 2mg q8h PRN, APAP 1000mg TID, lidocaine patch PRN, duloxetine,     transcribed by : Aliyah Milan  Orders:  1. Discontinue Ferrous Sulfate  2. Abdominal xray today - dx: abd pain  3.  Increase Lisinopril to 20 mg PO daily - dx: CHf, HTN  4. BMP in 2 wks - dx: CHF, CKD      Electronically signed by:  MARY Gómez CNP

## 2020-01-07 NOTE — LETTER
"    1/7/2020        RE: Jazmin Clifton  [address]  [facility] [place] [address]        [facility]  [facility] Medical Record Number:  [id number]  Place of Service where encounter took place:  [facility] ([facility]) [[id number]]  Chief Complaint   Patient presents with     RECHECK       HPI:    Jazmin Clifton  is a 87 year old (12/30/1932), who is being seen today for an episodic care visit.  HPI information obtained from: facility chart records, facility staff, patient report and [facility] chart review. Today's concern is:     Abdominal pain, right lower quadrant  Loose stools  RSV infection  Chronic obstructive pulmonary disease, unspecified COPD type (H)  Chronic diastolic congestive heart failure (H)  Essential hypertension  Chronic left shoulder pain  Chronic pain syndrome   Patient recently hospitalized with SOB, found to have RSV infection, COPD exacerbation. She was treated on a prednisone taper. Had been briefly treated with ceftriaxone and azithromycin. Today is last day of steroid taper, tomorrow will resume PTA 5mg prednisone for shoulder pain. She states her breathing is much better. Continues to have a cough, but mostly in the morning when she first gets up.     Complaint today is RLQ abdominal pain, states it has been on and off for the past 3 days. Reports she has also had some loose stools for the past 3 days. She states they are black, but staff have told her they are dark brown. She has been on an iron supplement - she states she stopped taking that while she was in the hospital because she thinks it was causing her loose stools, but nursing reports she has taken 9 doses since she returned to [facility]. Reports stool was like a \"mud pie\" today. She does have recent history of C.diff. She reports she did have nausea with vomiting after coughing episode this AM - states she vomited up phlegm afterwards. She denies any indigestion, has been able to eat well. Does have " umbilical hernia. She has been afebrile.     Past Medical and Surgical History reviewed in Epic today.    MEDICATIONS:  Current Outpatient Medications   Medication Sig Dispense Refill     acetaminophen (TYLENOL) 500 MG tablet Take 1,000 mg by mouth 3 times daily       aspirin (ASA) 81 MG chewable tablet Take 1 tablet (81 mg) by mouth daily       atorvastatin (LIPITOR) 40 MG tablet TAKE 1 TABLET BY MOUTH ONCE DAILY 30 tablet 98     benzonatate (TESSALON) 100 MG capsule Take 1 capsule (100 mg) by mouth 3 times daily as needed for cough 25 capsule 0     Blood Glucose Monitoring Suppl CORY 2 times daily Call nurse for further directions if blood glucose is less than 80 or greater than 250       BREO ELLIPTA 100-25 MCG/INH inhaler INHALE 1 PUFF BY MOUTH ONCE DAILY 1 Inhaler 11     calcium carbonate (TUMS) 500 MG chewable tablet Take 1 tablet (500 mg) by mouth 3 times daily       DOK PLUS 50-8.6 MG tablet TAKE 1 TABLET BY MOUTH TWICE DAILY 60 tablet 98     DONEPEZIL HCL PO Take 5 mg by mouth At Bedtime        DULoxetine (CYMBALTA) 30 MG capsule Take 1 capsule (30 mg) by mouth At Bedtime       DULoxetine (CYMBALTA) 60 MG capsule Take 1 capsule (60 mg) by mouth every morning       Emollient (MOISTURIZING LOTION EX) Externally apply topically 2 times daily Bilateral lower extremeties       fluticasone (FLONASE) 50 MCG/ACT nasal spray Spray 1 spray into both nostrils daily       furosemide (LASIX) 20 MG tablet TAKE 1 TABLET BY MOUTH ONCE DAILY 30 tablet 98     gabapentin (NEURONTIN) 300 MG capsule TAKE 1 CAPSULE BY MOUTH TWICE DAILY 60 capsule 98     ipratropium - albuterol 0.5 mg/2.5 mg/3 mL (DUONEB) 0.5-2.5 (3) MG/3ML neb solution Take 1 vial (3 mLs) by nebulization 4 times daily for 14 days 168 mL 0     levalbuterol (XOPENEX) 1.25 MG/3ML neb solution Take 1 ampule by nebulization every 4 hours as needed for shortness of breath / dyspnea or wheezing And every 4 hours PRN       Lidocaine (LIDOCARE) 4 % Patch Place 1 patch  onto the skin daily To desired area (shoulder or hip). On for 12hrs, off for 12hrs 30 patch 11     lisinopril (PRINIVIL/ZESTRIL) 20 MG tablet Take 1 tablet (20 mg) by mouth daily 30 tablet 11     NIFEdipine ER (ADALAT CC) 60 MG 24 hr tablet Take 60 mg by mouth daily        OMEPRAZOLE PO Take 40 mg by mouth every morning       oxyCODONE (OXYCONTIN) 10 MG 12 hr tablet Take 1 tablet (10 mg) by mouth every 12 hours 60 tablet 0     predniSONE (DELTASONE) 10 MG tablet Take 3 daily for 3 days, then 2 daily for 3 days, then 1 daily for 3 days--then restart the 5 mg tabs that you have been taking daily for shoulder pain. 18 tablet 0     tiZANidine (ZANAFLEX) 2 MG tablet Take 2 mg by mouth every 8 hours as needed        VITAMIN D3 1000 units tablet TAKE 1 TABLET BY MOUTH ONCE DAILY 31 tablet 98         REVIEW OF SYSTEMS:  4 point ROS including Respiratory, CV, GI and , other than that noted in the HPI,  is negative    Objective:  /78   Pulse 75   Temp 97.9  F (36.6  C)   Resp 16   Wt 85.3 kg (188 lb)   SpO2 93%   BMI 30.34 kg/m     Exam:  GENERAL APPEARANCE:  Alert, in no distress, oriented, cooperative  RESP:  respiratory effort and palpation of chest normal, lungs clear to auscultation , no respiratory distress  CV:  Palpation and auscultation of heart done , regular rate and rhythm, no murmur, rub, or gallop, peripheral edema 1-2+ in BLE, wraps in place  ABDOMEN:  moderate RLQ tenderness, mild gaurding, no rebound, umbilical hernis, + BS, non-acute abdomen  M/S:   generalized weakness, unsteady gait, decreased ROM to left shoulder  SKIN:  Inspection of skin and subcutaneous tissue baseline  NEURO:   Cranial nerves 2-12 are normal tested and grossly at patient's baseline  PSYCH:  oriented X 3, normal insight, judgement and memory, affect and mood normal, anxious at times    Labs:   Recent labs in [facility] reviewed by me today.  and   Most Recent 3 CBC's:  Recent Labs   Lab Test 12/29/19  0712 12/28/19  0608  12/26/19  1207   WBC 6.4 6.8 6.7   HGB 9.6* 9.4* 10.6*   MCV 93 93 95    196 195     Most Recent 3 BMP's:  Recent Labs   Lab Test 12/29/19  0712 12/27/19  0532 12/26/19  1207    143 144   POTASSIUM 4.2 4.0 3.9   CHLORIDE 110* 109 111*   CO2 31 30 28   BUN 33* 24 26   CR 1.04 0.89 1.07*   ANIONGAP 2* 4 5   BLAIR 9.5 9.2 9.3   GLC 95 134* 202*       ASSESSMENT/PLAN:  (R10.31) Abdominal pain, right lower quadrant  (primary encounter diagnosis)  (R19.5) Loose stools  Comment: Unclear etiology - does not appear to acute abdomen. Tolerating diet. Afebrile. Low suspicions for appendicitis. ? R/t recent loose stools, gas pain?  Plan: Will order abdominal x-ray today, if no acute concerns will order CMP and CBC on next labs day. Will discontinue iron per patient's request. Will continue to monitor. Diet as tolerated.     (B97.4) RSV infection  (J44.9) Chronic obstructive pulmonary disease, unspecified COPD type (H)  Comment: Resolved, continues to have AM cough, but is improving. Complete steroid taper. Discontinued scheduled duonebs as she no longer needed them and does not like how albuterol makes her feel  Plan: Resume prednisone 5mg daily, continue  breo ellipata, PRN levalbuterol nebs, continue tessalon PRN for now.     (I50.32) Chronic diastolic congestive heart failure (H)  (I10) Essential hypertension  Comment: Appears compensated, BP elevated at times but suspect pain and anxiety contributing.   Plan: Will increase lisinopril to 20mg daily, continue ASA, lipitor, furosemide 20mg daily, nifedipine ER 60mg daily, BMP in 2 weeks to monitor for stability.     (M25.512,  G89.29) Chronic left shoulder pain  (G89.4) Chronic pain syndrome  Comment: Pain appears to be at baseline. Follows with pain clinic. Resumes prednisone 5mg tomorrow.   Plan: Continue prednisone, oxycontin, oxycodone 5mg PRN daily, tizanidine 2mg q8h PRN, APAP 1000mg TID, lidocaine patch PRN, duloxetine,     transcribed by :  Aliyah Milan  Orders:  1. Discontinue Ferrous Sulfate  2. Abdominal xray today - dx: abd pain  3. Increase Lisinopril to 20 mg PO daily - dx: CHf, HTN  4. BMP in 2 wks - dx: CHF, CKD      Electronically signed by:  MARY Gómez CNP             Sincerely,        MARY Gómez CNP

## 2020-01-21 NOTE — PROGRESS NOTES
CC was at the AL facility today visting other member and RN asked CC to visit with member.  She stated that member had a fall last night with no injuries but that PT was in the building today and wanted to discuss DME so asked if CC could be part of this conversation.     CC met with member and Angelina PT with [facility].  It is being recommened that member have a transfer pole next to her lift chair to help with transfers.   Discussion was had about member using a 2 WW at this time but after review and discussion PT is recommending that member just transfer from bed to wheelchair, wheelchair to toilet and wheelchair to chair and not attempt to walk on her own without staff.     Discussion was had about member waiting for staff to help with walking and transfer and member was honest and stated that this is difficult for her to wait and she at times will attempt to do this on her own then.  Report from RN staff confirmed this.  CC said that she would work with both RN and NP to get member orders for transfer pole and also RN is going to review with adminstration of AL to see if they will support transfer pole and also install this.     CC is going to wait for RN to get back to CC on this decision and then CC will move forward on ordering this.     CC did review member chart and she does not have a change in condition and her ADL's are accurate at this time.  RS tool could be increase so CC asked RN to review and to update CC as needed.  Member is due for annual visit on March 2020.   Mirian Weldon MA [facility] Coordinator   [phone]

## 2020-01-28 PROBLEM — Z76.89 HEALTH CARE HOME: Chronic | Status: ACTIVE | Noted: 2019-02-13

## 2020-01-28 NOTE — PROGRESS NOTES
CC received message back for AL that member is scheduled to be seen on 2-4- for her teeth extraction.  Also AL does want to move forward with the transfer pole but they will not install so CC sent message to [facility] to get quote for both transfer pole and installation as well   Mirian Weldon MA [facility] Coordinator   [phone]

## 2020-02-04 NOTE — PROGRESS NOTES
"SUBJECTIVE/OBJECTIVE:                           Jazmin Clifton is an 87 year old female seen in their home for a follow-up visit for Medication Therapy Management.  She was referred to me from Junie Estrella NP.     Chief Complaint: follow-up from 7/30/19 [facility] visit.  She is concerned that she is on too many meds and would like to reduce meds where able.      Allergies/ADRs: Reviewed in Epic  Tobacco: History of tobacco dependence - quit 1992  Activity:  Mainly wheelchair for ambulation.  H/o falls - recently on 1/20 without injury per chart review & she notes a fall a few months ago (11/2019); describes that she went to open her apartment door, kicked with foot and lost balance; fall resulted in femur fracture and hospitalization with TCU stay.  Denies dizziness at time of fall.  PMH: Reviewed in Epic    Medication Adherence/Access:  no issues reported  Patient has assistance with medication administration: assisted living.    Dementia: Current medication includes Donepezil 5mg daily.  Patient reported at a previous visit that memory issues began right after a surgery that took longer than expected.  Potential side effects she is experiencing: nausea, occasional vomiting (prn Ondansetron available for her).    Depression:  Current medications include: Duloxetine 60mg every morning and 30mg at bedtime. She notes that she is feeling sad/depressed, and feels it is due to having \"so much sickness\", has been in and out of the hospital frequently in the last several months, and is sad that she can't do what she wants to.  Notes she is doing better than she was at holidays; upset with her son for telling her to not buy gifts for her great grandchildren.  Notes that doing crafts has helped with mood some.  States she is sleeping well at night.     Pain: Current medication includes Tylenol 1000mg tid, Biofreeze prn, Duloxetine 60mg qam and 30mg qpm, Gabapentin 300mg bid (has been increased since I last saw her), Oxycontin " "10mg bid, Hydromorphone 2mg bid prn, Tizanidine 2mg tid prn, Aspercreme patch daily to desired area (shoulder or hip), Prednisone 5mg daily for shoulder pain.  Hot and ice packs also available, and patient notes this is helpful.  Has a lift chair that has heat and massage, which is helpful to her as well.  Follows with the pain clinic.  Has h/o 7 back surgeries.  H/o DDD and spinal stenosis of lumbar region.  Potential medication side effects she is experiencing: edema, fatigue (very tired during the day always).  She notes that the Biofreeze gel is most helpful for her shoulder, and feels the Aspercreme patch is helpful for hip; would like to use this daily to hip.  Noted on chart review that Prednisone has been tapered over time, and has seemed helpful for shoulder pain, allowing her to participate in therapy more & reduce use of prn narcotics, although pt has not been able to report improvement with Prednisone.  Notes pain \"all the time\" in hip and back.  Describes hip pain as a \"constant ache\" and back pain as \"stabbing\".  Notes the pain is \"sometimes tolerable.\"  She is hesitant to reduce the Oxycontin due to worrying pain would increase.  Feels the Tizanidine is helpful.  Notes her hands and head shake; difficulty pouring coffee out of a pot.      HFpEF, HTN, CAD, h/o CVA: Current medications include Furosemide 20mg daily, Lisinopril 20mg daily, Nifedipine ER 60mg daily, ASA 81mg daily.  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  Patient reports the following potential medication side effects: edema, weakness.   Note an unknown allergy listed in Epic for ACEis, but appears pt tolerating Lisinopril per chart review.  Does have a history of renal artery stenosis.  Notes occasional dizziness with position changes, h/o falls as noted above, edema.  Denies SOB.  Notes occasional chest pain which will typically occur at night, doesn't last long, occasional palpitations.  Bruises easily.  ECHO:  Date 7/26/17, EF 60-65% and Date " 2/2019, EF 55-60%  Receiving lymphedema wraps.  BP Readings from Last 3 Encounters:   01/07/20 134/78   12/29/19 (!) 144/65   12/10/19 (!) 146/79     BP and pulse provided by AL staff today: 133/76mmHg, p81     GERD: Current medications include: Prilosec (omeprazole) 40mg once daily, prn Tums. The patient does have a history of GI bleed.  Omeprazole was previously increased from 20mg to 40mg daily due to pt having swallowing issues with lower dose thought due to GERD.  Has difficulty swallowing pills.  Denies GERD symptoms.    Anemia: Stopped Ferrous Sulfate on 1/7/20 per patient request.  Most recent Hgb 9.6 on 12/29/19.      Supplements:  Current medication includes Vitamin D3 1000u daily.  Mg supplement dc'd in September following level = 1.9 per chart review.    COPD:  Current medication includes Breo Ellipta daily, prn Xopenex nebs, prn Benzonatate.  Also on Prednisone 5mg daily as noted above for shoulder pain.  Denies SOB, cough, wheeze.  Reports not using prn nebs currently; last used in hospital.  Recent COPD exacerbation with RSV end of December.    Allergic rhinitis: Current medications include fluticasone nasal spray 1 spray(s) once daily.  Noted h/o acute dysfunction of right eustachian tube as well Denies symptoms, and would like to try d'c.     Constipation: Current medications include Senna-S 1 tab bid prn, prn Bisacodyl supp.  Denies problems with constipation.      Estimated Creatinine Clearance: 41.9 mL/min (based on SCr of 1.04 mg/dL).        ASSESSMENT:                             Current medications were reviewed today.     Medication Adherence: good, no issues identified    Dementia: May benefit from d'c of Donepezil.  Possible that it is contributing to her nausea and occasional vomiting requiring prn Ondansetron use.  May also contribute to GERD symptoms. She would like to try d'c of Donepezil.      Depression:  Needs improvement.  Weather may be contributing as well, and she endorses this.   Would not recommend further increase in Duloxetine as doses >60mg have not been found to be more effective in studies.  Continue to monitor.  Could consider light box if feel this may be helpful for her.  Possible that Duloxetine is contributing to tremor she notes, however, hesitant to reduce dose due to her mood, and as noted below- recommend taper and d'c of Gabapentin which could be contributing to tremor.    Pain: Follows with pain clinic.  Patient is on high risk meds: Oxycodone, Tizanidine, Hydromorphone, Gabapentin, and combo increases risk of CNS and respiratory depression, falls, confusion, sedation, weakness, etc.  These risks were discussed today, as well as previously.  Pain control has been difficult, and appears Prednisone potentially helping with pain and reducing need for prn narcotics.  Did discuss that pain meds likely contributing to her fatigue and weakness.  Of note, Gabapentin may also be contributing to tremor/shaking she notes, and there has been a gradual increase in this dose.  She would like to reduce meds where able.  May benefit from trial taper and d'c of Gabapentin and monitor for worsening of symptoms.  Following potential change to Gabapentin, in future, could consider further gradual taper of Prednisone, again with close monitoring of symptoms.  Additionally, pt feels Biofreeze is most helpful for shoulder and would like to use this to shoulder and would like Aspercreme patch applied to hip once daily; both these measures may be helpful for pain control.        HFpEF, HTN, CAD, h/o CVA: BPs typically at goal <150/90mmHg.  This goal reasonable for this elderly patient at risk of hypotension, dizziness, falls, tissue/cerebral hypoperfusion.      GERD: Pt does have h/o gi bleed, anemia, and is on ASA, so should continue on PPI for gi protection.  May consider reduction in Omeprazole dose to 20mg daily and monitor dysphagia, GERD symptoms.  PPIs may contribute to low Mg, inc risk of  Cdiff, low BMD/inc fracture risk, pneumonia, and may be dose related.    Anemia:  As noted above, iron supplement dc'd 1/7/20.  May benefit from recheck Hgb to ensure not trending down.    Supplements:  Appears due for Mg check due to d'c following level WNLs in September.    COPD:  Stable.  If not using prn Benzonatate, please consider d'c to simplify med list where able.    Allergic Rhinitis:  May consider d'c of Fluticasone NS, and consider using seasonally.  Pt would like to try d'c.    Constipation: Stable.       PLAN:                            1. Provider may consider d'c Donepezil and follow-up cognition and function.  Please also monitor for improvement of nausea and vomiting, GERD symptoms.    2.  Provider may consider Biofreeze scheduled twice daily to shoulder and change Aspercreme(lido) patch to be applied to hip daily (vs shoulder or hip).    3.  Provider may consider reduction in Gabapentin to 200mg twice daily for 1 week, then 100mg twice daily for 1 week, then d'c.  Monitor pain, and for improvement in fatigue, dizziness, water retention.    4.  Provider may consider reduction in Omeprazole to 20mg once daily and monitor dysphagia, GERD symptoms.    5.  Provider may consider d'c of prn Benzonatate.    6.  Provider may consider checking Mg level and Hgb to determine if restart of supplementation necessary.    7.  Provider may consider d'c of Fluticasone NS.          I spent 30 minutes with this patient today. A copy of the visit note was provided to the patient's referring provider.    Will follow up in 3 months, sooner if necessary.    The patient was mailed a summary of these recommendations as an after visit summary.     Uma Webber, Pharm.D.,[facility] [place]  Board Certified Geriatric Pharmacist  Medication Therapy Management Pharmacist  [phone]

## 2020-02-04 NOTE — PATIENT INSTRUCTIONS
Recommendations from today's MTM visit:                                                      1. Provider may consider stopping Donepezil and follow-up cognition and function.  Please also monitor for improvement of nausea and vomiting, GERD symptoms.    2.  Provider may consider Biofreeze scheduled twice daily to shoulder and change Aspercreme(lido) patch to be applied to hip daily (vs shoulder or hip).    3.  Provider may consider reduction in Gabapentin to 200mg twice daily for 1 week, then 100mg twice daily for 1 week, then stop.  Monitor pain, and for improvement in fatigue, dizziness, water retention.    4.  Provider may consider reduction in Omeprazole to 20mg once daily and monitor swallowing, GERD symptoms.    5.  Provider may consider stopping as needed Benzonatate.    6.  Provider may consider checking magnesium level and hemoglobin to determine if restart of supplementation necessary.    7.  Provider may consider stopping Fluticasone nasal spray.    It was great to speak with you today.  I value your experience and would be very thankful for your time with providing feedback on our clinic survey. You may receive a survey via email or text message in the next few days.     Next MTM visit: 3 months, sooner if necessary    To schedule another MTM appointment, please call me directly at [phone].     My Clinical Pharmacist's contact information:                                                      It was a pleasure talking with you today!  Please feel free to contact me with any questions or concerns you have.      Uma Webber, Pharm.D.,[facility] [place]  Board Certified Geriatric Pharmacist  Medication Therapy Management Pharmacist  [phone]

## 2020-02-07 NOTE — PROGRESS NOTES
2-7-20   CC had been working with both NP, PT, AL and DME to get approvals, orders and also quote for the transfer pole since meeting with member at AL.    PT sent notes of support to CC and NP wrote orders as well.    AL director Uma wanted to know about installation and [facility] stated that this would be a tention pole and that would be place next to member lift chair in the room per PT recommendations.    Uma wanted [facility] to contact her direction for installation so CC requested this of [facility]       Auth will be submitted and then CC will f/u as needed.   Mirian Weldon MA [facility] Coordinator   [phone]

## 2020-02-14 NOTE — LETTER
2018        RE: Jazmin Clifton  [facility]  [address] [facility] [address]        PRE-OP EVALUATION:    [place] Medical Record Number:  [id number]  Place of Service where encounter took place:  [facility] (SNF) [[id number]]  Today's date: 2018    [facility]  [address] [place] [id number] [phone]  Dept: [phone]    PRE-OP EVALUATION:  Today's date: 2018    Jazmin M Etienne (: 1932) presents for pre-operative evaluation assessment as requested by Dr. Morris.  She requires evaluation and anesthesia risk assessment prior to undergoing surgery/procedure for treatment of lumbar spinal stenosis .    Proposed Surgery/ Procedure: L1-2 decompression  Date of Surgery/ Procedure: Cancelled  Time of Surgery/ Procedure:   Hospital/Surgical Facility:   Surgery cancelled  Primary Physician: Roby Willis  Type of Anesthesia Anticipated: General    Patient has a Health Care Directive or Living Will:  YES : Full code    1. YES - DO YOU HAVE A HISTORY OF HEART ATTACK, STROKE, STENT, BYPASS OR SURGERY ON AN ARTERY IN THE HEAD, NECK, HEART OR LEG? History of CVA, MCA stent  2. NO - Do you ever have any pain or discomfort in your chest?  3. YES - DO YOU HAVE A HISTORY OF HEART FAILURE CHF Echo 2017 showed EF of 60-65%, compensated  4. YES - ARE YOUR TROUBLED BY SHORTNESS OF BREATH WHEN WALKING ON THE LEVEL, UP A SLIGHT HILL OR AT NIGHT? SOB walking up hill, has COPD  5. NO - Do you currently have a cold, bronchitis or other respiratory infection?  6. YES - DO YOU HAVE A COUGH, SHORTNESS OF BREATH OR WHEEZING? Chronic COPD cough  7. NO - Do you sometimes get pains in the calves of your legs when you walk?  8. NO - Do you or anyone in your family have previous history of blood clots?  9. YES - DO YOU OR DOES ANYONE IN YOUR FAMILY HAVE A SERIOUS BLEEDING PROBLEM SUCH AS PROLONGED BLEEDING FOLLOWING SURGERIES OR  CUTS? Mother had problems, patient does not  10. YES - HAVE YOU EVER HAD PROBLEMS WITH ANEMIA OR BEEN TOLD TO TAKE IRON PILLS? On Vitamin B supplement  11. NO - Have you had any abnormal blood loss such as black, tarry or bloody stools, or abnormal vaginal bleeding?  12. NO - Have you ever had a blood transfusion?  13. NO - Have you or any of your relatives ever had problems with anesthesia?  14. NO - Do you have sleep apnea, excessive snoring or daytime drowsiness?  15. NO - Do you have any prosthetic heart valves?  16. YES - DO YOU HAVE PROSTHETIC JOINTS? Left hip  17. NO - Is there any chance that you may be pregnant?      HPI:     HPI related to upcoming procedure: Jazmin Clifton is a 85 y.o. Female with PMH of CKD, CHF, COPD, previous laminectomy, dementia and falls with left metatarsal fracture who was admitted 10/30/18 with back pain. MRI lumbar spine revealed moderate to marked canal stenosis at L1-L2 and neurosurgery was consulted. She was started on medrol taper, zinc and vitamin C. Neurosurgery recommended L1-2 decompression, planning for surgery in the next week.       ANEMIA - Patient has a recent history of moderate-severe anemia, which has not been symptomatic. Anemia chronic. Treatment has been vitamin B supplement. .                                                                                                                                            .  CHF - Patient has a longstanding history of moderate-severe CHF. Exacerbating conditions include hypertension and COPD. Currently the patient's condition is same. Current treatment regimen includes ACE inhibitor, long acting nitrate, ASA, diuretic and spirolactone. The patient denies chest pain, edema, orthopnea, SOB or recent weight gain. Last Echocardiogram 7/2017 - EF 60-65%, EKG 11/5/18: NSR, no changes from previous EKG                                                                                                                                                                                .  COPD - Patient has a longstanding history of moderate-severe COPD . Patient has been doing well overall noting NO SYMPTOMS and continues on medication regimen consisting of Breo Ellipta,, Anora Ellipta, levalbuterol PRN without adverse reactions or side effects.                                                                                                           .  DEPRESSION - Patient has a long history of Depression of moderate severity requiring medication for control with recent symptoms being stable..Current symptoms of depression include none.                                                                                                                                                                                    .  HYPERLIPIDEMIA - Patient has a long history of significant Hyperlipidemia requiring medication for treatment with recent good control. Patient reports no problems or side effects with the medication.                                                                                                                                                       .  HYPERTENSION - Patient has longstanding history of HTN , currently denies any symptoms referable to elevated blood pressure. Specifically denies chest pain, palpitations, dyspnea, orthopnea, PND or peripheral edema. Blood pressure readings have been in normal range. Current medication regimen is as listed below. Patient denies any side effects of medication.                                                                                                                                                                                          .  RENAL INSUFFICIENCY - Patient has a longstanding history of moderate-severe chronic renal insufficiency. Last Cr 1.33.                                                                                                                                                                                .  RECENT UTI: Urine culture on 10/30 negative, currently asymptomatic.     RIGHT GROIN MASS: Reports increased right inguinal pain, palpable mass to right groin. Has previously has cysts to this area surgically removed. Reports she has been told that there was some damage done to lymph nodes in the area after previous surgery. Renal cysts noted on recent CT scan. Appointment made with general surgery for evaluation of mass on 11/16; ultrasound ordered for today in TCU. Has been afebrile, but will check CBC.     MEDICAL HISTORY:     Patient Active Problem List    Diagnosis Date Noted     Morbid obesity (H) 06/19/2018     Priority: Medium     Mild cognitive impairment 09/22/2017     Priority: Medium     S/P carotid endarterectomy 09/06/2017     Priority: Medium     Carotid stenosis, symptomatic w/o infarct, right 08/31/2017     Priority: Medium     Thyroid nodule 08/23/2017     Priority: Medium     Trigger point of extremity 08/23/2017     Priority: Medium     Trochanteric bursitis of right hip 08/23/2017     Priority: Medium     Urinary tract infection without hematuria, site unspecified 08/23/2017     Priority: Medium     CKD (chronic kidney disease) stage 3, GFR 30-59 ml/min (H) 08/16/2017     Priority: Medium     ACP (advance care planning) 08/03/2017     Priority: Medium     8/3/2017  Recommend Code Status in chart and patient's Advance Care Planning documents be reviewed to ensure alignment with patient's current wishes.  Most recent Advance Care Planning document is a POLST dated 7/24/17 indicating DNI.  Agnieszka Santillan,  Advance Care Planning Liaison           Transient cerebral ischemia, unspecified type 08/01/2017     Priority: Medium     Carotid artery stenosis, symptomatic, right 08/01/2017     Priority: Medium     CVA (cerebral vascular accident) (H) 07/26/2017     Priority: Medium     Chronic obstructive pulmonary disease, unspecified COPD type (H) 07/25/2017      Priority: Medium     Generalized muscle weakness 07/25/2017     Priority: Medium     Dizziness 07/25/2017     Priority: Medium     Osteoarthritis of right hip, unspecified osteoarthritis type 07/25/2017     Priority: Medium     Hypotension, unspecified hypotension type 07/25/2017     Priority: Medium     Hip joint replacement status 04/18/2012     Priority: Medium     Osteoarthritis 04/18/2012     Priority: Medium     DDD (degenerative disc disease), lumbar 04/18/2012     Priority: Medium     Mild major depression (H) 04/18/2012     Priority: Medium     Incontinence of urine 04/18/2012     Priority: Medium     Osteoporosis 10/25/2011     Priority: Medium     S/P laminectomy 10/25/2011     Priority: Medium     CARDIOVASCULAR SCREENING; LDL GOAL LESS THAN 100 10/31/2010     Priority: Medium     CHF (congestive heart failure) (H) 09/17/2008     Priority: Medium     Diastolic disfunction - ECHP 2008       Restrictive lung disease 09/17/2008     Priority: Medium     PFT 4/2008       Renovascular hypertension 11/09/2006     Priority: Medium     Problem list name updated by automated process. Provider to review       Benign neoplasm of colon 10/04/2006     Priority: Medium     Angiodysplasia - due for repeat 10 years.  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 NO edema. Continue current Therapy.HYPERTENSION: Dynacirc CR 10mg daily initiated today. Will continue adjusting blood pressure medicatins as needed until readings at target.VITAMIN D  DEFICIENCY - Completed therapy, discontinue ergocalciferol.ANEMIA, EPO DEFICIENCY - Hgb 10.2. Not on EPO.  "Continue observation for now. Recnet Hgb drop due to lumbar laminectomy.  Problem list name updated by automated process. Provider to review       Essential hypertension, benign 05/01/2006     Priority: Medium     Atherosclerosis of renal artery (H)      Priority: Medium     Left renal artery stenosis       Esophageal reflux      Priority: Medium     Gastroesophageal Reflux Disease       Cerebral aneurysm, nonruptured      Priority: Medium     Cerebral Aneurysm - unchanged on rcent MRI.  Seen by neurosurg.  [place] she should have follow up MRA every 2 years (due 2010)       Other specified cardiac dysrhythmias(427.89)      Priority: Medium     Bradycardia, improved on lower dose beta blockers         Past Medical History:   Diagnosis Date     ABDOMINAL PAIN GENERALIZED 3/15/2006    1 month of abdominal pain that bryn radiates into her back and chest.  Pt was hospitilzed 2x for the pain at [facility] --pt hopsitized for 3 days.  Rcords not ab=vailable.  She was told either \" twisted intestine or blockage of bowel.  Pt feels it is the hiatal hernia.  Pt hospitilized again a second time  A month ago.  Ct scans x 2 showed \" nothing.\"  Pt had a barium and xrays.  Pt sa     Abdominal pain, generalized 3/15/2006    1 month of abdominal pain that ashleyqwhicfrederick radiates into her back and chest.  Pt was hospitilzed 2x for the pain at [facility] --pt hopsitized for 3 days.  Rcords not ab=vailable.  She was told either \" twisted intestine or blockage of bowel.  Pt feels it is the hiatal hernia.  Pt hospitilized again a second time  A month ago.  Ct scans x 2 showed \" nothing.\"  Pt had a barium and xrays.  Pt sa     Atherosclerosis of renal artery (H)     Left renal artery stenosis     BENIGN HYPERTENSION 5/1/2006 5/1/2006   Increase catapres to 0.3 mg and decrease clonidine to 0.1 mg daily.  Recheck bp in 1 month.      Benign neoplasm of scalp and skin of neck     Seborrheic keratosis     Cerebral aneurysm, " nonruptured     Cerebral Aneurysm     Depressive disorder, not elsewhere classified     Depression     Esophageal reflux     Gastroesophageal Reflux Disease     Female stress incontinence      Gastrointestinal malfunction arising from mental factors     Dyspepsia     Generalized osteoarthrosis, unspecified site     DJD-chronic back pain     Herpes zoster dermatitis of eyelid      Insomnia, unspecified      Lumbago     Chronic back pain     Other chest pain     Atypical Chest Pain     Other specified cardiac dysrhythmias(427.89)     Bradycardia, improved on lower dose beta blockers      Rectocele     Grade 3     Unspecified disorder of kidney and ureter     Renal insufficiency     Unspecified essential hypertension      Past Surgical History:   Procedure Laterality Date     CHOLECYSTECTOMY, LAPOROSCOPIC  1997    Cholecystectomy, Laparoscopic     ENDARTERECTOMY CAROTID Right 8/31/2017    Procedure: ENDARTERECTOMY CAROTID;  RIGHT CAROTID ENDARTERECTOMY WITH EEG;  Surgeon: iHlario Parry MD;  Location: [facility]     HYSTERECTOMY, RAYMOND  1982    oophorectomy,RAYMOND     SURGICAL HISTORY OF -       Laminectomy x 3     SURGICAL HISTORY OF -       MCA Aneurysm repair     SURGICAL HISTORY OF -   1996    Bladder suspension (Bladder Repair x2)     SURGICAL HISTORY OF -       Bilateral Hand Surgeries for Arthritis x2     SURGICAL HISTORY OF -       Repair of a Cerebral Aneurysm     Current Outpatient Prescriptions   Medication Sig Dispense Refill     ACETAMINOPHEN PO Take 325 mg by mouth every 4 hours as needed        Ascorbic Acid (VITAMIN C PO) Take 500 mg by mouth daily       aspirin 81 MG chewable tablet Take 81 mg by mouth       atorvastatin (LIPITOR) 40 MG tablet TAKE 1 TAB BY MOUTH AT BEDTIME FOR HIGH CHOLESTEROL 30 tablet 3     B COMPLEX-C-E-ZN PO Take 1 tablet by mouth daily       CALCIUM 600+D 600-200 MG-UNIT TABS TAKE 1 TAB BY MOUTH ONCE DAILY 30 tablet 11     calcium carbonate (TUMS) 500 MG chewable tablet Take 1 chew  tab by mouth every hour as needed for heartburn       CYCLOBENZAPRINE HCL PO Take 5 mg by mouth 3 times daily as needed for muscle spasms       DONEPEZIL HCL PO Take 5 mg by mouth daily       DULoxetine HCl (CYMBALTA PO) Take 30 mg by mouth daily       ESTRIOL 1MG/GRAM CREAM Apply 1 g topically See Admin Instructions Insert 1 application vaginally at bedtime every Mon, Wed, Fri for vaginal dryness, burning, itching apply pea size amount vaginally at bedtime three times a week       fluticasone (FLONASE) 50 MCG/ACT spray Spray 1 spray into both nostrils 2 times daily       fluticasone-vilanterol (BREO ELLIPTA) 100-25 MCG/INH oral inhaler Inhale 1 puff into the lungs daily       Furosemide (LASIX PO) Take 40 mg by mouth daily        FUROSEMIDE PO Take 20 mg by mouth daily as needed for weight gain daily for >2 pound weight gain in one day       GABAPENTIN PO Take 200 mg by mouth At Bedtime        guaiFENesin (MUCINEX) 600 MG 12 hr tablet Take 600 mg by mouth 2 times daily        guaiFENesin (ROBITUSSIN) 100 MG/5ML SYRP Take 5 mLs by mouth every 4 hours as needed for cough       HYDROcodone-acetaminophen (NORCO) 5-325 MG per tablet Take 1-2 tablets by mouth See Admin Instructions Give 1 tablet by mouth every 4 hours as needed for moderate to severe pain 1 tab for pain 1-5/10 AND Give 2 tablet by mouth every 4 hours as needed for moderate to severe pain 2 tabs for pain 6-10/10       hydrocortisone 1 % CREA cream Apply topically every 6 hours as needed for itching       isosorbide mononitrate (IMDUR) 30 MG 24 hr tablet TAKE 1 TAB BY MOUTH ONCE DAILY FOR HYPERTENSION/HIGH BLOOD PRESSURE 30 tablet 3     isradipine (DYNACIRC) 5 MG capsule Take 5 mg by mouth 2 times daily       levalbuterol (XOPENEX) 1.25 MG/3ML neb solution Take 1 ampule by nebulization every 4 hours as needed for shortness of breath / dyspnea or wheezing       lisinopril (PRINIVIL,ZESTRIL) 30 MG tablet TAKE 1 TAB BY MOUTH ONCE DAILY FOR HYPERTENSION/HIGH  BLOOD PRESSURE 30 tablet 3     loperamide (IMODIUM) 2 MG capsule Take 2 mg by mouth 4 times daily as needed for diarrhea       MAGNESIUM OXIDE PO Take 250 mg by mouth daily       Menthol, Topical Analgesic, (BIOFREEZE) 4 % GEL Externally apply topically daily And Three times a day PRN       menthol-zinc oxide (CALMOSEPTINE) 0.44-20.625 % OINT ointment Apply topically 2 times daily as needed for skin protection       methylPREDNISolone (MEDROL DOSEPAK) 4 MG tablet Take 4 mg by mouth daily follow package directions       nystatin (MYCOSTATIN) 081116 UNIT/GM POWD Apply topically 2 times daily as needed        Ondansetron (ZOFRAN ODT PO) Take 4 mg by mouth every 8 hours as needed for nausea       pantoprazole (PROTONIX) 40 MG EC tablet TAKE 1 TAB BY MOUTH ONCE DAILY BEFORE BREAKFAST FOR GERD 30 tablet 3     polyethylene glycol (MIRALAX/GLYCOLAX) Packet Take 1 packet by mouth daily       polyvinyl alcohol (LIQUIFILM TEARS) 1.4 % ophthalmic solution Place 1 drop into both eyes 2 times daily And 4 times a day PRN       senna-docusate (SENOKOT-S;PERICOLACE) 8.6-50 MG per tablet Take 1 tablet by mouth daily        SIMETHICONE-80 PO Take 160 mg by mouth 4 times daily as needed for intestinal gas        SPIRONOLACTONE PO Take 25 mg by mouth daily       umeclidinium-vilanterol (ANORO ELLIPTA) 62.5-25 MCG/INH oral inhaler Inhale 1 puff into the lungs daily       Zinc 30 MG CAPS Take 30 mg by mouth daily       memantine (NAMENDA) 5 MG tablet TAKE 1 TAB BY MOUTH ONCE DAILY FOR DEMENTIA 30 tablet 3     OTC products: None, except as noted above: See current medication list    Allergies   Allergen Reactions     Accupril      Ace Inhibitors Unknown     Accupril     Augmentin Unknown     Levofloxacin Unknown     Macrobid [Nitrofurantoin]      Morphine Other (See Comments)     hallucinations     Norvasc [Amlodipine Besylate]      Leg swelling       Quinapril       Latex Allergy: NO    Social History   Substance Use Topics     Smoking  "status: Former Smoker     Quit date: 1/1/1992     Smokeless tobacco: Not on file     Alcohol use Yes      Comment: wine occas.     History   Drug Use No       REVIEW OF SYSTEMS:   Positive selected, otherwise negative, RESPIRATORY: cough and dyspnea on exertion, :  incontinence, GI:  nausea and vomiting, MUSCULOSKELETAL: back pain and HEME/LYMPH: right groin mass, painful, tender    EXAM:   /78  Pulse 60  Temp 98.4  F (36.9  C)  Resp 18  Ht 5' 3\" (1.6 m)  Wt 195 lb (88.5 kg)  BMI 34.54 kg/m2  GENERAL APPEARANCE:  Alert, in no distress, oriented, cooperative  RESP:  respiratory effort and palpation of chest normal, lungs clear to auscultation , no respiratory distress  CV:  Palpation and auscultation of heart done , regular rate and rhythm, no murmur, rub, or gallop, no edema, +2 pedal pulses  ABDOMEN:  normal bowel sounds, soft, nontender, no hepatosplenomegaly or other masses, large, round  M/S:   palpable, likely fluid filled mass to right groin, moderately tendernes to groin and right thigh. tendernes to lumbar spine, mild warmth to right thigh  SKIN:  Inspection of skin and subcutaneous tissue baseline  NEURO:   Cranial nerves 2-12 are normal tested and grossly at patient's baseline  PSYCH:  oriented X 3, normal insight, judgement and memory, affect and mood normal    DIAGNOSTICS:     EKG: appears normal, NSR, normal axis, normal intervals, no acute ST/T changes c/w ischemia, no LVH by voltage criteria, unchanged from previous tracings  Hemoglobin (indicated for history of anemia or procedure with significant blood loss such as tonsillectomy, major intraperitoneal surgery, vascular surgery, major spine surgery, total joint replacement)  Serum Potassium  Serum Creatinine  Labs Drawn and in Process: CBC; US of mass to right groin    Recent Labs   Lab Test  11/12/18   0738  11/08/18   0737   06/04/18   0850   08/31/17   1113   07/26/17   1130   HGB  9.5*  9.7*   --   9.7*   < >   --    < >  11.0* "   PLT   --   186   --   172   < >  172   < >  156   INR   --    --    --    --    --    --    --   1.07   NA  139  146*   < >  142   < >  141   < >  137   POTASSIUM  4.7  4.4   < >  4.2   < >  5.3   < >  4.0   CR  1.33*  1.27*   < >  1.38*   < >  1.42*   < >  1.50*   A1C   --    --    --    --    --   6.2*   --    --     < > = values in this interval not displayed.        IMPRESSION:   Reason for surgery/procedure: lumbar spinal stenosis  Diagnosis/reason for consult: lumbar spinal stenosis, lumbago    The proposed surgical procedure is considered INTERMEDIATE risk.    REVISED CARDIAC RISK INDEX  The patient has the following serious cardiovascular risks for perioperative complications such as (MI, PE, VFib and 3  AV Block):  Congestive Heart Failure (pulmonary edema, PND, s3 holli, CXR with pulmonary congestion, basilar rales)  Cerebrovascular Disease (TIA or CVA)  INTERPRETATION: 2 risks: Class III (moderate risk - 6.6% complication rate)    The patient has the following additional risks for perioperative complications:  Delirium or Dementia  Morbid obesity      ICD-10-CM    1. Spinal stenosis of lumbar region with neurogenic claudication M48.062    2. Mass of right inguinal region R19.09    3. CKD (chronic kidney disease) stage 3, GFR 30-59 ml/min (H) N18.3    4. Chronic obstructive pulmonary disease, unspecified COPD type (H) J44.9    5. Congestive heart failure, unspecified HF chronicity, unspecified heart failure type (H) I50.9    6. Benign essential hypertension I10    7. Recent urinary tract infection Z87.440        ORDERS:     --Consult hospital rounder / IM to assist post-op medical management    Cardiovascular Risk  Beta blockers contraindicated due to h/o bradycardia      Pulmonary Risk  Incentive spirometry post op  Maximize COPD treatment: Done, on  Breo Ellipta and Anoro Ellipta inhalers,       --Patient is to take all scheduled medications on the day of surgery EXCEPT for modifications listed  below.    Anticoagulant or Antiplatelet Medication Use  ASPIRIN: Discontinue ASA 7-10 days prior to procedure to reduce bleeding risk.  It should be resumed post-operatively.        ACE Inhibitor or Angiotensin Receptor Blocker (ARB) Use  Ace inhibitor or Angiotensin Receptor Blocker (ARB) and will continue this medication due to the higher risk of uncontrolled perioperative hypertension (e.g. neurosurgical procedure)      APPROVAL NOT GIVEN to proceed with proposed procedure, without further diagnostic evaluation    UPDATE: Ultrasound showed seroma to right groin WBC WNL. Neurosurgery updated given seroma seen on right groin ultrasound and apt with general surgery on 11/16. They cancelled plans for current spinal decompression at this time, recommend treatment for seroma first and to rescheduled surgery after she has recovered from seroma treatment.        Signed Electronically by: MARY Gómez CNP    Copy of this evaluation report is provided to requesting physician.    Chip Preop Guidelines    Revised Cardiac Risk Index          Sincerely,        MARY Gómez CNP     PROVIDER:[TOKEN:[[id number]]]

## 2020-02-18 NOTE — PROGRESS NOTES
2-   asked to submit HHA for member   2 HHA visits for 12-12 and 12-19   CMS requested to send in auth   Mirian Weldon MA [facility] Coordinator   [phone]

## 2020-02-19 NOTE — PROGRESS NOTES
"Urology Daily Progress Note    24 hour events/Subjective:     - NAEO   - 1/2 L O2 for occasional apnea during sleep. No subjective SOB   - Only pain complaint is baseline hip pain    O:  Vitals: /46 (BP Location: Right arm)   Pulse 72   Temp 95.9  F (35.5  C) (Oral)   Resp 11   Ht 1.6 m (5' 3\")   Wt 88.3 kg (194 lb 10.7 oz)   SpO2 94%   BMI 34.48 kg/m    General: Alert, interactive, in NAD  Resp: Non-labored breathing on 0.5 L  Abdomen: Soft, non-tender   Maldonado/Urostomy: Clear yellow    I/O last 3 completed shifts:  In: 860 [P.O.:210; I.V.:650]  Out: 150 [Urine:110; Blood:40] - Last 24 hours    UO  110 since MN    Labs/Imaging  Heme:  Recent Labs   Lab 06/06/19  0845   WBC 7.5   HGB 13.4        Chem:  Recent Labs   Lab 06/06/19  0845   POTASSIUM 3.5   CR 1.24*       Assessment/Plan  86 year old y/o female POD#1 sp cystoscopy bladder biopsy and urethral lesion excision, appropriate for discharge    NEURO Pain well controlled on home regimen. Home aricept   CV HDS. Home statin   PULM Aggressive pulmonary toilet and I/S.   FEN/GI Regular diet. Home PPI    Maldonado to remain until follow up after discharge. Do not remove without urology order. Home plendil    HEME Hgb as above.    ID Afebrile, no labs indicated    Antibiotics: Completed periop antibiotics    ENDO No issues   ACTIVITY Up as tolerated  Ambulate at least TID    PPx SCDs, ambulation   DISPO Home         Seen and examined with chief resident, to be discussed with Dr. Fong    --    Dorothy Dillon MD  Urology Resident           Contacting the Urology Team     Please use the following job codes to reach the Urology Team. Note that you must use an in house phone and that job codes cannot receive text pages.     On weekdays, dial [phone] (or star-star-star [phone] on the new [employer]) then [phone] to reach the Adult Urology resident or PA on call    On weekdays, dial [phone] (or star-star-star [phone] on the new [employer]) then [phone] to reach the " Pediatric Urology resident    On weeknights and weekends, dial [phone] (or star-star-star 777 on the new [employer] tele[employer]) then [phone] to reach the Urology resident on call (for both Adult and Pediatrics)             Quality 110: Preventive Care And Screening: Influenza Immunization: Influenza Immunization Administered during Influenza season Detail Level: Detailed Quality 431: Preventive Care And Screening: Unhealthy Alcohol Use - Screening: Patient screened for unhealthy alcohol use using a single question and scores less than 2 times per year Quality 111:Pneumonia Vaccination Status For Older Adults: Pneumococcal Vaccination Previously Received

## 2020-03-03 PROBLEM — F02.80 ALZHEIMER'S DEMENTIA WITHOUT BEHAVIORAL DISTURBANCE (H): Chronic | Status: RESOLVED | Noted: 2017-05-12 | Resolved: 2020-01-01

## 2020-03-03 PROBLEM — G30.9 ALZHEIMER'S DEMENTIA WITHOUT BEHAVIORAL DISTURBANCE (H): Chronic | Status: RESOLVED | Noted: 2017-05-12 | Resolved: 2020-01-01

## 2020-03-03 NOTE — LETTER
3/3/2020        RE: Jazmin Clifton  [address]  [facility] [place] [address]        [facility]  Chief Complaint   Patient presents with     Annual Comprehensive Exam Assisted Living     [facility] Record Number:  [id number]  Place of Service where encounter took place:  [facility] [[id number]]    HPI:    Jazmin Clifton  is a 87 year old  (12/30/1932), who is being seen today for an annual comprehensive visit. HPI information obtained from: facility chart records, facility staff, patient report and [facility] chart review.  Today's concerns are:     Annual physical exam  Chronic obstructive pulmonary disease, unspecified COPD type (H)  Chronic diastolic congestive heart failure (H)  Hyperlipidemia, unspecified hyperlipidemia type  Coronary artery disease of native heart with stable angina pectoris, unspecified vessel or lesion type (H)  Morbid obesity (H)  CKD (chronic kidney disease) stage 3, GFR 30-59 ml/min (H)  Chronic left shoulder pain  DDD (degenerative disc disease), lumbar  Chronic pain syndrome  Mild cognitive impairment  Age-related osteoporosis without current pathological fracture  Depression with anxiety  Right groin mass  Dermatitis associated with moisture     Jazmin Clifton is currently being seen today at [facility] where she visits. She is concerned about a painful, red rash that she has developed under panus and in her groin area. States the rash appeared a few days ago and had gotten progressively worse, and is now quite painful. She is incontinent of some urine, and has some difficulty performing her own ita-cares, and does not always wait for staff to assist her.    She is also complaining ro RLQ abdominal pain, stated it started just before provider arrived, she has just had a large BM in the bathroom. Within a few minutes pain had resolved. She reports she gets these pain on occasion, and they usually occur after she has had a BM.  She denies any nausea, has been eating well. No recent issues with diarrhea or constipation.  Reports she still get indigestion. She recently had her teeth pulled and got new dentures. Continues to eat ground food because it is easier, states she has coughed when she tried to eat other things.     Denies any recent SOB or chest pain. No recent lightheadedness or dizziness. Does cough on occasion, but not often.     She continues to have low back and hip pain as well as left shoulder pain. She has been seen pain clinic. She continues to have some pain, but overall feels her pain regimen is adequate.     She has  History of multiple falls, including fall with pelvic fracture 11/2019. She is primarily WC bound, is able to transfer with use of pole and without assistance. Only able to walk very short distances with walker. Walker currently appears to be unsafe to use and right front wheel is unable to touch the ground. No obvious way noted to adjust wheel.      She is sleeping well. Feels her mood is good, was depressed late last year after a friend of her passed away, but is doing better. Staff report no behavioral concerns.     ALLERGIES: Accupril; Ace inhibitors; Augmentin; Blood-group specific substance; Levofloxacin; Morphine; Nitrofurantoin; Norvasc [amlodipine besylate]; and Quinapril  PAST MEDICAL HISTORY:  has a past medical history of ABDOMINAL PAIN GENERALIZED (3/15/2006), Abdominal pain, generalized (3/15/2006), Atherosclerosis of renal artery (H), BENIGN HYPERTENSION (5/1/2006), Benign neoplasm of scalp and skin of neck, Cerebral aneurysm, nonruptured, Congestive heart failure (H), Depressive disorder, not elsewhere classified, Esophageal reflux, Female stress incontinence, Gastrointestinal malfunction arising from mental factors, Generalized osteoarthrosis, unspecified site, Herpes zoster dermatitis of eyelid, Insomnia, unspecified, Lumbago, Other chest pain, Other specified cardiac dysrhythmias(427.89),  Rectocele, Unspecified disorder of kidney and ureter, and Unspecified essential hypertension.  PAST SURGICAL HISTORY:  has a past surgical history that includes surgical history of - ; surgical history of - ; surgical history of -  (1996); surgical history of - ; surgical history of - ; cholecystectomy, laporoscopic (1997); hysterectomy, mirtha (1982); Endarterectomy carotid (Right, 8/31/2017); Cystoscopy, Biopsy Urethra (N/A, 6/10/2019); and Urethroplasty (N/A, 6/10/2019).  IMMUNIZATIONS:  Immunization History   Administered Date(s) Administered     Flu, Unspecified 11/09/2006, 09/10/2014, 10/05/2017     Influenza (High Dose) 3 valent vaccine 09/30/2016, 09/26/2018, 09/24/2019     Influenza (IIV3) PF 11/10/1997, 10/26/1999, 12/15/2000, 11/09/2006, 01/01/2007, 11/13/2007, 11/24/2008, 10/20/2010, 10/05/2017     Pneumo Conj 13-V (2010&after) 03/08/2016     Pneumococcal 23 valent 10/26/1998, 08/27/1999, 01/01/2006     TDAP Vaccine (Adacel) 01/13/2017     Zoster vaccine, live 09/30/2016     Above immunizations pulled from [facility]. [facility] and facility records also reconciled. Outstanding information sent to  to update [facility].  Future immunizations are not needed at this point as all recommended immunizations are up to date.     Current Outpatient Medications   Medication Sig Dispense Refill     ACETAMINOPHEN EXTRA STRENGTH 500 MG tablet TAKE TWO TABLETS (1000MG) BY MOUTH THREE TIMES DAILY 186 tablet PRN     ASPIRIN LOW DOSE 81 MG chewable tablet CHEW AND SWALLOW ONE TABLET BY MOUTH ONCE DAILY 30 tablet 0     atorvastatin (LIPITOR) 40 MG tablet TAKE 1 TABLET BY MOUTH ONCE DAILY 30 tablet 0     benzocaine (ORAJEL/ANBESOL) 10 % gel Take by mouth 4 times daily as needed for moderate pain       bisacodyl (DULCOLAX) 10 MG suppository Place 10 mg rectally daily as needed for constipation       Blood Glucose Monitoring Suppl CORY 2 times daily Call nurse for further directions if blood glucose is less  than 80 or greater than 250       BREO ELLIPTA 100-25 MCG/INH inhaler INHALE 1 PUFF BY MOUTH ONCE DAILY 1 Inhaler 97     calcium carbonate (TUMS) 500 MG chewable tablet Take 1 chew tab by mouth 3 times daily as needed       DOK PLUS 50-8.6 MG tablet TAKE 1 TABLET BY MOUTH TWICE DAILY 60 tablet 98     DULoxetine (CYMBALTA) 30 MG capsule TAKE 1 CAPSULE BY MOUTH AT BEDTIME 30 capsule 11     DULoxetine (CYMBALTA) 60 MG capsule TAKE 1 CAPSULE BY MOUTH EVERY MORNING 31 capsule PRN     Emollient (MOISTURIZING LOTION EX) Externally apply topically 2 times daily Bilateral lower extremeties       furosemide (LASIX) 20 MG tablet TAKE 1 TABLET BY MOUTH ONCE DAILY 30 tablet 98     gabapentin (NEURONTIN) 100 MG capsule Take 2 capsules (200 mg) by mouth daily for 7 days, THEN 1 capsule (100 mg) daily for 7 days. 21 capsule 0     HYDROmorphone (DILAUDID) 2 MG tablet Take 2 mg by mouth 2 times daily as needed for severe pain       hypromellose-dextran (GENTEAL TEARS) 0.1-0.3 % ophthalmic solution Place 1 drop into both eyes every 6 hours as needed for dry eyes       levalbuterol (XOPENEX) 1.25 MG/3ML neb solution Take 1 ampule by nebulization every 4 hours as needed for shortness of breath / dyspnea or wheezing And every 4 hours PRN       Lidocaine (LIDOCARE) 4 % Patch Place 1 patch onto the skin daily To desired area (shoulder or hip). On for 12hrs, off for 12hrs 30 patch 11     lisinopril (PRINIVIL/ZESTRIL) 20 MG tablet Take 1 tablet (20 mg) by mouth daily 30 tablet 11     magnesium hydroxide (MILK OF MAGNESIA) 400 MG/5ML suspension Take 30 mLs by mouth daily as needed for constipation or heartburn       Menthol, Topical Analgesic, (BIOFREEZE) 4 % GEL Externally apply topically 4 times daily as needed To left shoulder and right hip       NIFEdipine ER (ADALAT CC) 60 MG 24 hr tablet Take 60 mg by mouth daily        nystatin (MYCOSTATIN) 691722 UNIT/GM external powder Apply topically 3 times daily 1 Bottle 97     omeprazole  "(PRILOSEC) 40 MG DR capsule TAKE 1 CAPSULE BY MOUTH ONCE DAILY 30 capsule 0     ondansetron (ZOFRAN) 4 MG tablet Take 4 mg by mouth every 6 hours as needed for nausea       OXYCONTIN 10 MG 12 hr tablet TAKE ONE TABLET BY MOUTH EVERY 12 HOURS 60 tablet 0     predniSONE (DELTASONE) 5 MG tablet TAKE 1 TABLET BY MOUTH ONCE DAILY 31 tablet PRN     tiZANidine (ZANAFLEX) 2 MG tablet Take 2 mg by mouth every 8 hours as needed        VITAMIN D3 1000 units tablet TAKE 1 TABLET BY MOUTH ONCE DAILY 31 tablet 98         Case Management:  I have reviewed the Assisted Living care plan, current immunizations and preventive care/cancer screening. .Future cancer screening is not clinically indicated secondary to age/goals of care Patient's desire to return to the community is not present. Current Level of Care is appropriate.    Advance Directive Discussion:    I reviewed the current advanced directives as reflected in EPIC, the POLST and the facility chart, and verified the congruency of orders. I contacted the first party Jazmin (she is her own decision maker) and discussed the plan of Care.  I did review the advance directives with the resident.     Team Discussion:  I communicated with the appropriate disciplines involved with the Plan of Care:   Nursing    Patient's goal is pain control and comfort.  Information reviewed:  Medications, vital signs, orders, and nursing notes.    ROS:  10 point ROS of systems including Constitutional, Eyes, Respiratory, Cardiovascular, Gastroenterology, Genitourinary, Integumentary, Musculoskeletal, Psychiatric were all negative except for pertinent positives noted in my HPI.    Vitals:  /81   Pulse 75   Temp 98.3  F (36.8  C)   Resp 20   Ht 1.6 m (5' 3\")   Wt 85.5 kg (188 lb 6.4 oz)   SpO2 96%   BMI 33.37 kg/m    Body mass index is 33.37 kg/m .  Exam:  GENERAL APPEARANCE:  Alert, in no distress, oriented, cooperative  ENT:  Mouth and posterior oropharynx normal, moist mucous " membranes, dentures in place, small patches of erythema to lower gums  EYES:  EOM, conjunctivae, lids, pupils and irises normal, PERRL  RESP:  respiratory effort and palpation of chest normal, lungs clear to auscultation , no respiratory distress, on RA  CV:  Palpation and auscultation of heart done , regular rate and rhythm, no murmur, rub, or gallop, +2 pedal pulses, peripheral edema 1-2+ in BLE  ABDOMEN:  no guarding or rebound, bowel sounds normal, soft, non-tender, no distension  M/S:   Gait and station abnormal generalized weakness, unsteady gait, decreased ROM to neck/left shoulder, seroma to rigth groin palpable, nontender, soft  SKIN:  Inspection of skin and subcutaneous tissue baseline, rash to left pannus and groin - erythema, patchy, sweet odor  NEURO:   Cranial nerves 2-12 are normal tested and grossly at patient's baseline, Examination of sensation by touch normal  PSYCH:  oriented X 3, normal insight, judgement and memory, affect and mood normal     Lab/Diagnostic data:   Recent labs in [facility] reviewed by me today.  and   Most Recent 3 CBC's:  Recent Labs   Lab Test 12/29/19  0712 12/28/19  0608 12/26/19  1207   WBC 6.4 6.8 6.7   HGB 9.6* 9.4* 10.6*   MCV 93 93 95    196 195     Most Recent 3 BMP's:  Recent Labs   Lab Test 12/29/19  0712 12/27/19  0532 12/26/19  1207    143 144   POTASSIUM 4.2 4.0 3.9   CHLORIDE 110* 109 111*   CO2 31 30 28   BUN 33* 24 26   CR 1.04 0.89 1.07*   ANIONGAP 2* 4 5   BLAIR 9.5 9.2 9.3   GLC 95 134* 202*     Most Recent TSH and T4:  Recent Labs   Lab Test 05/10/19  1104   TSH 0.51       ASSESSMENT/PLAN  (Z00.00) Annual physical exam  (primary encounter diagnosis)  (J44.9) Chronic obstructive pulmonary disease, unspecified COPD type (H)  Comment: Respiratory status stable, no recent exacerbations. Reviewed medications with patient.  Plan: Continue Breo Ellipta 100-25 1 puff daily, Xopenex nebs PRN, will discontinue Flonase as not used recently. Continue to monitor.      (I50.32) Chronic diastolic congestive heart failure (H)  (E78.5) Hyperlipidemia, unspecified hyperlipidemia type  (I25.118) Coronary artery disease of native heart with stable angina pectoris, unspecified vessel or lesion type (H)  HTN  Comment: Echo 2/2019 showed EF of 55-60%, Appears compensated. BP controlled. Discussed with morris, patient,   Plan: Continue ASA 81mg daily, atorvastatin 40mg daily, lisinopril 20mg daily, nifedipine ER 60mg daily, furosemide 20mg daily. . Continue to monitor and adjust. Check FLP PRN.      (E66.01) Morbid obesity (H)  Comment: BMI 33, weight has been stable ~190lbs. Very limited mobility.   Plan: Monitor, Discussed health food choices with patient.     (N18.3) CKD (chronic kidney disease) stage 3, GFR 30-59 ml/min (H)  Comment: Baseline GFR 40-50,   Plan: Avoid nephrotoxic medications. BMP q 6months and PRN to monitor for stability.     (M25.512,  G89.29) Chronic left shoulder pain  (M51.36) DDD (degenerative disc disease), lumbar  (G89.4) Chronic pain syndrome  Comment: History of multiple back surgeries, Mobility very limited at baseline. Has followed with pain clinic, received multiple hip injections. She currently feels pain regimen is adequate. Discussed with patient, nursing   Plan: Will attempt to wean off gabapentin as below as not convinced is beneficial. Will continue OxyContin ER 10mg BID, dilaudid 2mg PO BID PRN (will monitor use and consider dose reduction if able), prednisone 5mg daily for left shoulder pain, lidocaine patch PRN, Biofreeze PRN, tylenol 1000mg TID, and tizanidine 2mg TID PRN. Home PT to [facility], assist in ordering new walker if needed.     Dental pain  Comment: Recent teeth pulled from lower jaw, fitted for dentures  Plan: Continue orajel PRN, follow-up with dentist for further denture fitting. .     (G31.84) Mild cognitive impairment  Comment: Has been on aricept, recent SLUMS 29/30  Plan: discontinue aricept as may be contributing to GI  disturbance, continue to monitor.     (M81.0) Age-related osteoporosis without current pathological fracture  Comment: Recent pelvic fracture, given kidney function, not a candidate for bisphosphonate.   Plan: Continue calcium, vitamin D supplements. Primarily non-ambulatory.     (F41.8) Depression with anxiety  Comment: Mood appears stable, recent geriatric depression score done at [facility] was 3/9. Discussed POC with patient, nursing   Plan: Continue duloxetine 60mg q AM, 30mg at bedtime continue to monitor.     (R19.09) Right groin mass  Comment: History of previous mass removal resulting in injury to lymph nodes. Has chronic seroma. Has been previously evaluated by surgery - they recommended compression as if drained was highly likely to re-occur. Patient has poor compliance with compression garments. Find them too uncomfortable. discused with patient nursing.   Plan: Encourage use of compression garments as tolerated. Educated patient on importance of use.     (L30.8) Dermatitis associated with moisture  Comment: Likely secondary to incontinence, limited ability to perform ita-cares and obesity. Discussed with patient, nursing   Plan: nystatin (MYCOSTATIN) 219273 UNIT/GM external         Powder  - apply TID until resolved then PRN, home health RN ordered for education and assistance with management.     Based on JNC-8 goals,  patients age of 87 year old, presence of diabetes or CKD, and goals of care goal BP is  <140/90 mm Hg. Patient is stable with current plan of care and routine assessment..      Transcribed by : Sabrina Bob  1.Nystatin Powder 100,000 unit/gram, apply TID to groin/panus for rash until resolved, then change to PRN.  2. Discontinue Aricept, Benzonatate, fluticasone, and miconazole powder  3. Decrease Gabapentin to 200mg PO daily x 2 weeks, then 100mg PO x 1 week, then discontinue.  4. BMP, Hgb, Mag level on next lab day; Dx: CKD, anemia  5. Home PT and RN.       Total time spent  with patient visit was 60 minutes including patient visit and review of past records. Greater than 50% of total time spent with counseling and coordinating care due to review of medications, discussion fo plan of care and patient education and are coordination with nursing staff d/t CHF, HTN COPD, morbid obesity, rash, chronic pain, impaired mobility, code status, CKD .       Electronically signed by:  MARY Gómez CNP             Sincerely,        MARY Gómez CNP

## 2020-03-03 NOTE — PROGRESS NOTES
[facility]  Chief Complaint   Patient presents with     Annual Comprehensive Exam Assisted Living     [facility] Record Number:  [id number]  Place of Service where encounter took place:  [facility] [[id number]]    HPI:    Jazmin Clifton  is a 87 year old  (12/30/1932), who is being seen today for an annual comprehensive visit. HPI information obtained from: facility chart records, facility staff, patient report and [facility] Epic chart review.  Today's concerns are:     Annual physical exam  Chronic obstructive pulmonary disease, unspecified COPD type (H)  Chronic diastolic congestive heart failure (H)  Hyperlipidemia, unspecified hyperlipidemia type  Coronary artery disease of native heart with stable angina pectoris, unspecified vessel or lesion type (H)  Morbid obesity (H)  CKD (chronic kidney disease) stage 3, GFR 30-59 ml/min (H)  Chronic left shoulder pain  DDD (degenerative disc disease), lumbar  Chronic pain syndrome  Mild cognitive impairment  Age-related osteoporosis without current pathological fracture  Depression with anxiety  Right groin mass  Dermatitis associated with moisture     Jazmin Clifton is currently being seen today at [facility] where she visits. She is concerned about a painful, red rash that she has developed under panus and in her groin area. States the rash appeared a few days ago and had gotten progressively worse, and is now quite painful. She is incontinent of some urine, and has some difficulty performing her own ita-cares, and does not always wait for staff to assist her.    She is also complaining ro RLQ abdominal pain, stated it started just before provider arrived, she has just had a large BM in the bathroom. Within a few minutes pain had resolved. She reports she gets these pain on occasion, and they usually occur after she has had a BM. She denies any nausea, has been eating well. No recent issues with diarrhea or constipation.   Reports she still get indigestion. She recently had her teeth pulled and got new dentures. Continues to eat ground food because it is easier, states she has coughed when she tried to eat other things.     Denies any recent SOB or chest pain. No recent lightheadedness or dizziness. Does cough on occasion, but not often.     She continues to have low back and hip pain as well as left shoulder pain. She has been seen pain clinic. She continues to have some pain, but overall feels her pain regimen is adequate.     She has  History of multiple falls, including fall with pelvic fracture 11/2019. She is primarily WC bound, is able to transfer with use of pole and without assistance. Only able to walk very short distances with walker. Walker currently appears to be unsafe to use and right front wheel is unable to touch the ground. No obvious way noted to adjust wheel.      She is sleeping well. Feels her mood is good, was depressed late last year after a friend of her passed away, but is doing better. Staff report no behavioral concerns.     ALLERGIES: Accupril; Ace inhibitors; Augmentin; Blood-group specific substance; Levofloxacin; Morphine; Nitrofurantoin; Norvasc [amlodipine besylate]; and Quinapril  PAST MEDICAL HISTORY:  has a past medical history of ABDOMINAL PAIN GENERALIZED (3/15/2006), Abdominal pain, generalized (3/15/2006), Atherosclerosis of renal artery (H), BENIGN HYPERTENSION (5/1/2006), Benign neoplasm of scalp and skin of neck, Cerebral aneurysm, nonruptured, Congestive heart failure (H), Depressive disorder, not elsewhere classified, Esophageal reflux, Female stress incontinence, Gastrointestinal malfunction arising from mental factors, Generalized osteoarthrosis, unspecified site, Herpes zoster dermatitis of eyelid, Insomnia, unspecified, Lumbago, Other chest pain, Other specified cardiac dysrhythmias(427.89), Rectocele, Unspecified disorder of kidney and ureter, and Unspecified essential  hypertension.  PAST SURGICAL HISTORY:  has a past surgical history that includes surgical history of - ; surgical history of - ; surgical history of -  (1996); surgical history of - ; surgical history of - ; cholecystectomy, laporoscopic (1997); hysterectomy, mirtha (1982); Endarterectomy carotid (Right, 8/31/2017); Cystoscopy, Biopsy Urethra (N/A, 6/10/2019); and Urethroplasty (N/A, 6/10/2019).  IMMUNIZATIONS:  Immunization History   Administered Date(s) Administered     Flu, Unspecified 11/09/2006, 09/10/2014, 10/05/2017     Influenza (High Dose) 3 valent vaccine 09/30/2016, 09/26/2018, 09/24/2019     Influenza (IIV3) PF 11/10/1997, 10/26/1999, 12/15/2000, 11/09/2006, 01/01/2007, 11/13/2007, 11/24/2008, 10/20/2010, 10/05/2017     Pneumo Conj 13-V (2010&after) 03/08/2016     Pneumococcal 23 valent 10/26/1998, 08/27/1999, 01/01/2006     TDAP Vaccine (Adacel) 01/13/2017     Zoster vaccine, live 09/30/2016     Above immunizations pulled from [facility]. [facility] and facility records also reconciled. Outstanding information sent to  to update [facility].  Future immunizations are not needed at this point as all recommended immunizations are up to date.     Current Outpatient Medications   Medication Sig Dispense Refill     ACETAMINOPHEN EXTRA STRENGTH 500 MG tablet TAKE TWO TABLETS (1000MG) BY MOUTH THREE TIMES DAILY 186 tablet PRN     ASPIRIN LOW DOSE 81 MG chewable tablet CHEW AND SWALLOW ONE TABLET BY MOUTH ONCE DAILY 30 tablet 0     atorvastatin (LIPITOR) 40 MG tablet TAKE 1 TABLET BY MOUTH ONCE DAILY 30 tablet 0     benzocaine (ORAJEL/ANBESOL) 10 % gel Take by mouth 4 times daily as needed for moderate pain       bisacodyl (DULCOLAX) 10 MG suppository Place 10 mg rectally daily as needed for constipation       Blood Glucose Monitoring Suppl CORY 2 times daily Call nurse for further directions if blood glucose is less than 80 or greater than 250       BREO ELLIPTA 100-25 MCG/INH inhaler INHALE 1 PUFF  BY MOUTH ONCE DAILY 1 Inhaler 97     calcium carbonate (TUMS) 500 MG chewable tablet Take 1 chew tab by mouth 3 times daily as needed       DOK PLUS 50-8.6 MG tablet TAKE 1 TABLET BY MOUTH TWICE DAILY 60 tablet 98     DULoxetine (CYMBALTA) 30 MG capsule TAKE 1 CAPSULE BY MOUTH AT BEDTIME 30 capsule 11     DULoxetine (CYMBALTA) 60 MG capsule TAKE 1 CAPSULE BY MOUTH EVERY MORNING 31 capsule PRN     Emollient (MOISTURIZING LOTION EX) Externally apply topically 2 times daily Bilateral lower extremeties       furosemide (LASIX) 20 MG tablet TAKE 1 TABLET BY MOUTH ONCE DAILY 30 tablet 98     gabapentin (NEURONTIN) 100 MG capsule Take 2 capsules (200 mg) by mouth daily for 7 days, THEN 1 capsule (100 mg) daily for 7 days. 21 capsule 0     HYDROmorphone (DILAUDID) 2 MG tablet Take 2 mg by mouth 2 times daily as needed for severe pain       hypromellose-dextran (GENTEAL TEARS) 0.1-0.3 % ophthalmic solution Place 1 drop into both eyes every 6 hours as needed for dry eyes       levalbuterol (XOPENEX) 1.25 MG/3ML neb solution Take 1 ampule by nebulization every 4 hours as needed for shortness of breath / dyspnea or wheezing And every 4 hours PRN       Lidocaine (LIDOCARE) 4 % Patch Place 1 patch onto the skin daily To desired area (shoulder or hip). On for 12hrs, off for 12hrs 30 patch 11     lisinopril (PRINIVIL/ZESTRIL) 20 MG tablet Take 1 tablet (20 mg) by mouth daily 30 tablet 11     magnesium hydroxide (MILK OF MAGNESIA) 400 MG/5ML suspension Take 30 mLs by mouth daily as needed for constipation or heartburn       Menthol, Topical Analgesic, (BIOFREEZE) 4 % GEL Externally apply topically 4 times daily as needed To left shoulder and right hip       NIFEdipine ER (ADALAT CC) 60 MG 24 hr tablet Take 60 mg by mouth daily        nystatin (MYCOSTATIN) 020942 UNIT/GM external powder Apply topically 3 times daily 1 Bottle 97     omeprazole (PRILOSEC) 40 MG DR capsule TAKE 1 CAPSULE BY MOUTH ONCE DAILY 30 capsule 0     ondansetron  "(ZOFRAN) 4 MG tablet Take 4 mg by mouth every 6 hours as needed for nausea       OXYCONTIN 10 MG 12 hr tablet TAKE ONE TABLET BY MOUTH EVERY 12 HOURS 60 tablet 0     predniSONE (DELTASONE) 5 MG tablet TAKE 1 TABLET BY MOUTH ONCE DAILY 31 tablet PRN     tiZANidine (ZANAFLEX) 2 MG tablet Take 2 mg by mouth every 8 hours as needed        VITAMIN D3 1000 units tablet TAKE 1 TABLET BY MOUTH ONCE DAILY 31 tablet 98         Case Management:  I have reviewed the Assisted Living care plan, current immunizations and preventive care/cancer screening. .Future cancer screening is not clinically indicated secondary to age/goals of care Patient's desire to return to the community is not present. Current Level of Care is appropriate.    Advance Directive Discussion:    I reviewed the current advanced directives as reflected in EPIC, the POLST and the facility chart, and verified the congruency of orders. I contacted the first party Jazmin (she is her own decision maker) and discussed the plan of Care.  I did review the advance directives with the resident.     Team Discussion:  I communicated with the appropriate disciplines involved with the Plan of Care:   Nursing    Patient's goal is pain control and comfort.  Information reviewed:  Medications, vital signs, orders, and nursing notes.    ROS:  10 point ROS of systems including Constitutional, Eyes, Respiratory, Cardiovascular, Gastroenterology, Genitourinary, Integumentary, Musculoskeletal, Psychiatric were all negative except for pertinent positives noted in my HPI.    Vitals:  /81   Pulse 75   Temp 98.3  F (36.8  C)   Resp 20   Ht 1.6 m (5' 3\")   Wt 85.5 kg (188 lb 6.4 oz)   SpO2 96%   BMI 33.37 kg/m   Body mass index is 33.37 kg/m .  Exam:  GENERAL APPEARANCE:  Alert, in no distress, oriented, cooperative  ENT:  Mouth and posterior oropharynx normal, moist mucous membranes, dentures in place, small patches of erythema to lower gums  EYES:  EOM, conjunctivae, lids, " pupils and irises normal, PERRL  RESP:  respiratory effort and palpation of chest normal, lungs clear to auscultation , no respiratory distress, on RA  CV:  Palpation and auscultation of heart done , regular rate and rhythm, no murmur, rub, or gallop, +2 pedal pulses, peripheral edema 1-2+ in BLE  ABDOMEN:  no guarding or rebound, bowel sounds normal, soft, non-tender, no distension  M/S:   Gait and station abnormal generalized weakness, unsteady gait, decreased ROM to neck/left shoulder, seroma to rigth groin palpable, nontender, soft  SKIN:  Inspection of skin and subcutaneous tissue baseline, rash to left pannus and groin - erythema, patchy, sweet odor  NEURO:   Cranial nerves 2-12 are normal tested and grossly at patient's baseline, Examination of sensation by touch normal  PSYCH:  oriented X 3, normal insight, judgement and memory, affect and mood normal     Lab/Diagnostic data:   Recent labs in [facility] reviewed by me today.  and   Most Recent 3 CBC's:  Recent Labs   Lab Test 12/29/19  0712 12/28/19  0608 12/26/19  1207   WBC 6.4 6.8 6.7   HGB 9.6* 9.4* 10.6*   MCV 93 93 95    196 195     Most Recent 3 BMP's:  Recent Labs   Lab Test 12/29/19  0712 12/27/19  0532 12/26/19  1207    143 144   POTASSIUM 4.2 4.0 3.9   CHLORIDE 110* 109 111*   CO2 31 30 28   BUN 33* 24 26   CR 1.04 0.89 1.07*   ANIONGAP 2* 4 5   BLAIR 9.5 9.2 9.3   GLC 95 134* 202*     Most Recent TSH and T4:  Recent Labs   Lab Test 05/10/19  1104   TSH 0.51       ASSESSMENT/PLAN  (Z00.00) Annual physical exam  (primary encounter diagnosis)  (J44.9) Chronic obstructive pulmonary disease, unspecified COPD type (H)  Comment: Respiratory status stable, no recent exacerbations. Reviewed medications with patient.  Plan: Continue Breo Ellipta 100-25 1 puff daily, Xopenex nebs PRN, will discontinue Flonase as not used recently. Continue to monitor.     (I50.32) Chronic diastolic congestive heart failure (H)  (E78.5) Hyperlipidemia, unspecified  hyperlipidemia type  (I25.118) Coronary artery disease of native heart with stable angina pectoris, unspecified vessel or lesion type (H)  HTN  Comment: Echo 2/2019 showed EF of 55-60%, Appears compensated. BP controlled. Discussed with morris, patient,   Plan: Continue ASA 81mg daily, atorvastatin 40mg daily, lisinopril 20mg daily, nifedipine ER 60mg daily, furosemide 20mg daily. . Continue to monitor and adjust. Check FLP PRN.      (E66.01) Morbid obesity (H)  Comment: BMI 33, weight has been stable ~190lbs. Very limited mobility.   Plan: Monitor, Discussed health food choices with patient.     (N18.3) CKD (chronic kidney disease) stage 3, GFR 30-59 ml/min (H)  Comment: Baseline GFR 40-50,   Plan: Avoid nephrotoxic medications. BMP q 6months and PRN to monitor for stability.     (M25.512,  G89.29) Chronic left shoulder pain  (M51.36) DDD (degenerative disc disease), lumbar  (G89.4) Chronic pain syndrome  Comment: History of multiple back surgeries, Mobility very limited at baseline. Has followed with pain clinic, received multiple hip injections. She currently feels pain regimen is adequate. Discussed with patient, nursing   Plan: Will attempt to wean off gabapentin as below as not convinced is beneficial. Will continue OxyContin ER 10mg BID, dilaudid 2mg PO BID PRN (will monitor use and consider dose reduction if able), prednisone 5mg daily for left shoulder pain, lidocaine patch PRN, Biofreeze PRN, tylenol 1000mg TID, and tizanidine 2mg TID PRN. Home PT to [facility], assist in ordering new walker if needed.     Dental pain  Comment: Recent teeth pulled from lower jaw, fitted for dentures  Plan: Continue orajel PRN, follow-up with dentist for further denture fitting. .     (G31.84) Mild cognitive impairment  Comment: Has been on aricept, recent SLUMS 29/30  Plan: discontinue aricept as may be contributing to GI disturbance, continue to monitor.     (M81.0) Age-related osteoporosis without current pathological  fracture  Comment: Recent pelvic fracture, given kidney function, not a candidate for bisphosphonate.   Plan: Continue calcium, vitamin D supplements. Primarily non-ambulatory.     (F41.8) Depression with anxiety  Comment: Mood appears stable, recent geriatric depression score done at [facility] was 3/9. Discussed POC with patient, nursing   Plan: Continue duloxetine 60mg q AM, 30mg at bedtime continue to monitor.     (R19.09) Right groin mass  Comment: History of previous mass removal resulting in injury to lymph nodes. Has chronic seroma. Has been previously evaluated by surgery - they recommended compression as if drained was highly likely to re-occur. Patient has poor compliance with compression garments. Find them too uncomfortable. discused with patient nursing.   Plan: Encourage use of compression garments as tolerated. Educated patient on importance of use.     (L30.8) Dermatitis associated with moisture  Comment: Likely secondary to incontinence, limited ability to perform ita-cares and obesity. Discussed with patient, nursing   Plan: nystatin (MYCOSTATIN) 950882 UNIT/GM external         Powder  - apply TID until resolved then PRN, home health RN ordered for education and assistance with management.     Based on JNC-8 goals,  patients age of 87 year old, presence of diabetes or CKD, and goals of care goal BP is  <140/90 mm Hg. Patient is stable with current plan of care and routine assessment..      Transcribed by : Sabrina Bob  1.Nystatin Powder 100,000 unit/gram, apply TID to groin/panus for rash until resolved, then change to PRN.  2. Discontinue Aricept, Benzonatate, fluticasone, and miconazole powder  3. Decrease Gabapentin to 200mg PO daily x 2 weeks, then 100mg PO x 1 week, then discontinue.  4. BMP, Hgb, Mag level on next lab day; Dx: CKD, anemia  5. Home PT and RN.       Total time spent with patient visit was 60 minutes including patient visit and review of past records. Greater than  50% of total time spent with counseling and coordinating care due to review of medications, discussion fo plan of care and patient education and are coordination with nursing staff d/t CHF, HTN COPD, morbid obesity, rash, chronic pain, impaired mobility, code status, CKD .       Electronically signed by:  MARY Gómez CNP

## 2020-03-09 PROBLEM — G89.4 CHRONIC PAIN SYNDROME: Chronic | Status: ACTIVE | Noted: 2019-01-01

## 2020-03-09 PROBLEM — J18.9 PNEUMONIA: Status: RESOLVED | Noted: 2019-01-01 | Resolved: 2020-01-01

## 2020-03-09 PROBLEM — Z86.73 HISTORY OF STROKE: Chronic | Status: ACTIVE | Noted: 2018-02-02

## 2020-03-09 PROBLEM — R19.7 VOMITING AND DIARRHEA: Status: ACTIVE | Noted: 2020-01-01

## 2020-03-09 PROBLEM — I25.118 CORONARY ARTERY DISEASE OF NATIVE HEART WITH STABLE ANGINA PECTORIS, UNSPECIFIED VESSEL OR LESION TYPE (H): Chronic | Status: ACTIVE | Noted: 2019-09-24

## 2020-03-09 PROBLEM — J18.9 COMMUNITY ACQUIRED PNEUMONIA: Status: RESOLVED | Noted: 2019-01-01 | Resolved: 2020-01-01

## 2020-03-09 PROBLEM — I50.32 CHRONIC DIASTOLIC CONGESTIVE HEART FAILURE (H): Chronic | Status: ACTIVE | Noted: 2018-02-02

## 2020-03-09 PROBLEM — R11.10 VOMITING AND DIARRHEA: Status: ACTIVE | Noted: 2020-01-01

## 2020-03-09 NOTE — ED PROVIDER NOTES
"  History     Chief Complaint   Patient presents with     Nausea, Vomiting, & Diarrhea     x 5 days     HPI  Jazmin Clifton is a 87 year old female with significant past medical history including COPD, cerebral aneurysm, hypertension, CKD stage III, CHF, hyperlipidemia and coronary artery disease who presents to the Emergency Department with concern for nausea, vomiting and diarrhea. Vomiting began 5 days ago. States she is only bringing up \"phlegm\" because she has not had anything to eat. Last episode of vomiting 1 hour prior to arrival. Associated with nausea and generalized abdominal pain. Onset of diarrhea three days ago, no black or bloody stools. She is able to drink water and urinated once this morning. She also has a cough and intermittent headaches. Possible fever at onset of symptoms, but none currently. Patient lives in assisted living, but reports no known ill exposures.  Patient is nonambulatory, transfers independently.  No recent travel. Surgical history includes cholecystectomy and total hysterectomy. No prior small bowel obstruction.     Allergies:  Allergies   Allergen Reactions     Accupril Other (See Comments)     Dizziness       Ace Inhibitors Other (See Comments)     Accupril - dizziness     Augmentin Unknown     Blood-Group Specific Substance Other (See Comments)     Patient has a Non-specific antibody. Blood Product orders may be delayed.  Draw one red top and two purple top tubes for ALL Type and Screen/ Type and Crossmatch orders.      Levofloxacin Other (See Comments) and Muscle Pain (Myalgia)     Pt prescribed ciprofloxacin in Jan 2018 (no rxn documented)         Morphine Other (See Comments)     hallucinations     Nitrofurantoin Nausea and Vomiting     Norvasc [Amlodipine Besylate] Swelling     Leg swelling       Quinapril Other (See Comments)     Hypertension. 3.29.18 - Takes and tolerates lisinopril           Problem List:    Patient Active Problem List    Diagnosis Date Noted     " Vomiting and diarrhea 2020     Priority: Medium     Chronic pain syndrome 2019     Priority: Medium     Lymphedema 2019     Priority: Medium     Pubic ramus fracture (H) 2019     Priority: Medium     Acute dysfunction of right eustachian tube 2019     Priority: Medium     Coronary artery disease of native heart with stable angina pectoris, unspecified vessel or lesion type (H) 2019     Priority: Medium     Sialadenitis 2019     Priority: Medium     Post-operative state 06/10/2019     Priority: Medium     Mass of urethra 2019     Priority: Medium     Gross hematuria 2019     Priority: Medium     Anemia of chronic renal failure, stage 4 (severe) (H) 2019     Priority: Medium     Health Care Home 2019     Priority: Medium     [facility] Care Coordinator  Mirian Weldon MA, LSW  [phone]    [facility] CARE PLAN SUMMARY    Member Name:  Jazmin STAHL Etienne    Address:  [address] [place] [address]    As of 19 (moved to another apt)  Phone: [phone] (home)    :  1932 Date of Assessment: 19  Change in condition visit    Health Plan:  [facility]  Health Plan Number: [id number] Medical Assistance Number: [id number]  Financial Worker:  [facility]   Case #:     FVP Care Coordinator:  Mirian Weldon MA, LSW CC Phone:  [phone]  CC Fax:  [phone]   [facility] Enrollment Date: 19 Case Mix:  H  Rate Cell:  E  Waiver Type: EW     Primary Emergency Contact:  Chi Clifton  Address: [address] [place] [address]  Mobile Phone: [phone]  Relation: Son    Secondary Emergency Contact: Lucius Clifton  Address: [address] [place] [address]   Home Phone: [phone]  Work Phone: [phone]  Relation: Son Language:  English  :  No   Health Care Agent/POA:   Advanced Directives/Living Will:     Primary Care Clinic/Phone/Fax:  [facility]/(p)  "[phone], (f) [phone] Primary Dx:  COPD J44.9  Secondary Dx: DDD M51.36  Cognitive Impairment G31.84   Primary Physician:  Junie Estrella   Height:  5' 3\"  Weight:  188 lbs   Specialty Physician:    Audiologist:     Eye Care Provider:   Dental Care Provider:  [facility]   [facility]: [facility] [phone]   Other:        [facility] CURRENT SERVICES SUMMARY  Equipment owned/DME history:  Member son purchased electric wheelchair for member;    Member has scooter, lift chair, 3 wheeled walker   SERVICE TYPE/PROVIDER NAME/PHONE AUTH DATE FREQUENCY Units OR $ Amt DESCRIPTION   Medical Transportation: [facility] [phone]  Fax:  4-1-19 thru 3-30-20 review annually/PRN  as needed     Assisted Living: [facility] [address] (Assisted Living) [phone] 24 hr Customized Living  Fax:  4-1-19 thru 3-30-20 review annually/PRN daily See RS/AL Tool      Supplies: [facility] [phone]  Fax:  4-1-19 thru 3-30-20 review annually/PRN      2-7-20 thru 4-   Monthly            One time order  1 pull up per day   1 liner per day        189.00    75.00  XL pull ups     Regular liners         Transfer pole with handles     Installation      Abraham ESPARZA    [phone]   5-1-19 thru 3-30-20  as needed  Declined 6-1-19     HHA/RN: [facility] [phone]  Fax:  12-12-19 thru 12-19-19  Review annually/PRN   weekly  2 HHA visits per HC POC                      Hip pain 10/29/2018     Priority: Medium     Impaired ambulation 10/29/2018     Priority: Medium     Morbid obesity (H) 06/19/2018     Priority: Medium     Multiple closed fractures of metatarsal bone 05/28/2018     Priority: Medium     Right groin mass 05/03/2018     Priority: Medium     Chronic diastolic congestive heart failure (H) 02/02/2018     Priority: Medium     History of stroke 02/02/2018     Priority: Medium     Mild cognitive impairment 09/22/2017     Priority: Medium     S/P " carotid endarterectomy 09/06/2017     Priority: Medium     Underwent for symptomatic right carotid artery stenosis        Carotid stenosis, symptomatic w/o infarct, right 08/31/2017     Priority: Medium     Thyroid nodule 08/23/2017     Priority: Medium     Trochanteric bursitis of right hip 08/23/2017     Priority: Medium     CKD (chronic kidney disease) stage 3, GFR 30-59 ml/min (H) 08/16/2017     Priority: Medium     Transient cerebral ischemia, unspecified type 08/01/2017     Priority: Medium     Chronic obstructive pulmonary disease, unspecified COPD type (H) 07/25/2017     Priority: Medium     Osteoarthritis of right hip, unspecified osteoarthritis type 07/25/2017     Priority: Medium     Retention of urine 04/15/2017     Priority: Medium     History of intracranial aneurysm 04/14/2017     Priority: Medium     Postherpetic neuralgia 11/14/2016     Priority: Medium     Umbilical hernia without obstruction and without gangrene 06/03/2016     Priority: Medium     Spinal stenosis of lumbar region 01/11/2016     Priority: Medium     Spondylolisthesis, lumbar region 01/11/2016     Priority: Medium     BPPV (benign paroxysmal positional vertigo) 11/27/2014     Priority: Medium     Dyspepsia 11/27/2014     Priority: Medium     Female stress incontinence 11/27/2014     Priority: Medium     History of bradycardia 11/27/2014     Priority: Medium     History of DVT (deep vein thrombosis) 11/27/2014     Priority: Medium     History of herpes zoster 11/27/2014     Priority: Medium     Constipation 10/20/2012     Priority: Medium     Rectocele 08/31/2012     Priority: Medium     Hip joint replacement status 04/18/2012     Priority: Medium     Osteoarthritis 04/18/2012     Priority: Medium     DDD (degenerative disc disease), lumbar 04/18/2012     Priority: Medium     Mild major depression (H) 04/18/2012     Priority: Medium     Incontinence of urine 04/18/2012     Priority: Medium     Hyperlipidemia 12/07/2011     Priority:  Medium     Osteoporosis 10/25/2011     Priority: Medium     S/P laminectomy 10/25/2011     Priority: Medium     CARDIOVASCULAR SCREENING; LDL GOAL LESS THAN 100 10/31/2010     Priority: Medium     Normocytic anemia 02/17/2010     Priority: Medium     Restrictive lung disease 09/17/2008     Priority: Medium     PFT 4/2008       Renovascular hypertension 11/09/2006     Priority: Medium     Problem list name updated by automated process. Provider to review       Benign neoplasm of colon 10/04/2006     Priority: Medium     Angiodysplasia - due for repeat 10 years.  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 NO edema. Continue current Therapy.HYPERTENSION: Dynacirc CR 10mg daily initiated today. Will continue adjusting blood pressure medicatins as needed until readings at target.VITAMIN D  DEFICIENCY - Completed therapy, discontinue ergocalciferol.ANEMIA, EPO DEFICIENCY - Hgb 10.2. Not on EPO. Continue observation for now. Recnet Hgb drop due to lumbar laminectomy.  Problem list name updated by automated process. Provider to review       Essential hypertension, benign 05/01/2006     Priority: Medium     Esophageal reflux      Priority: Medium     Gastroesophageal Reflux Disease       Cerebral aneurysm, nonruptured      Priority: Medium     Cerebral Aneurysm - unchanged on rcent MRI.  Seen by neurosurg.  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     Priority: Medium         Past Medical History:    Past Medical History:   Diagnosis Date     ABDOMINAL PAIN GENERALIZED 3/15/2006     Abdominal pain, generalized 3/15/2006     Atherosclerosis of renal artery (H)      BENIGN HYPERTENSION 5/1/2006     Benign neoplasm of scalp and skin of neck      Cerebral aneurysm, nonruptured      Congestive heart failure (H)      Depressive disorder, not elsewhere classified      Esophageal reflux      Female stress incontinence      Gastrointestinal malfunction arising from mental factors      Generalized osteoarthrosis, unspecified site      Herpes zoster dermatitis of eyelid      Insomnia, unspecified      Lumbago      Other chest pain      Other specified cardiac dysrhythmias(427.89)      Rectocele      Unspecified disorder of kidney and ureter      Unspecified essential hypertension        Past Surgical History:    Past Surgical History:   Procedure Laterality Date     CHOLECYSTECTOMY, LAPOROSCOPIC  1997    Cholecystectomy, Laparoscopic     CYSTOSCOPY, BIOPSY URETHRA N/A 6/10/2019    Procedure: Cystoscopy With Biopsy, Removal Of Urethral Lesion;  Surgeon: Yesy Fong MD;  Location: [facility] OR     ENDARTERECTOMY CAROTID Right 8/31/2017    Procedure: ENDARTERECTOMY CAROTID;  RIGHT CAROTID ENDARTERECTOMY WITH EEG;  Surgeon: Hilario Parry MD;  Location: [facility] OR     HYSTERECTOMY, RAYMOND  1982    oophorectomy,RAYMOND     SURGICAL HISTORY OF -       Laminectomy x 3     SURGICAL HISTORY OF -       MCA Aneurysm repair     SURGICAL HISTORY OF -   1996    Bladder suspension (Bladder Repair x2)     SURGICAL HISTORY OF -       Bilateral Hand Surgeries for Arthritis x2     SURGICAL HISTORY OF -       Repair of a Cerebral Aneurysm     URETHROPLASTY N/A 6/10/2019    Procedure: Urethral Reconstruction;  Surgeon: Yesy Fong MD;  Location: [facility] OR       Family History:    Family History   Problem Relation Age of Onset     Cancer Mother         stomache     Cerebrovascular Disease Father      Heart  "Disease Father      Cancer Brother         lymphoma     Eye Disorder Son      Asthma Son      Diabetes Son      Gastrointestinal Disease Son         no control over bladder or bowels     C.A.D. No family hx of      Hypertension No family hx of      Breast Cancer No family hx of      Cancer - colorectal No family hx of      Prostate Cancer No family hx of        Social History:  Marital Status:   [5]  Social History     Tobacco Use     Smoking status: Former Smoker     Years: 50.00     Last attempt to quit: 1992     Years since quittin.2     Smokeless tobacco: Never Used   Substance Use Topics     Alcohol use: Not Currently     Comment: wine occas.     Drug use: No        Medications:    No current outpatient medications on file.      Review of Systems  All other systems are reviewed and are negative.       Physical Exam   BP: (!) 146/73  Pulse: 85  Heart Rate: 84  Temp: 97.9  F (36.6  C)  Resp: 18  Height: 160 cm (5' 3\")  Weight: 84.8 kg (187 lb)  SpO2: 94 %      Physical Exam  Nontoxic appearing no respiratory distress alert and oriented to self and place  Head atraumatic normocephalic  Neck supple full active painless range of motion  Lungs diminished, poor air movement  Heart regular no murmur  Abdomen soft obese, mild diffuse tenderness without guarding or rebound bowel sounds positive   Strength and sensation grossly intact throughout the extremities, gait and station normal  Speech is fluent, good eye contact, thought processes are rational  Lower extremities with mild bilateral lower extremity edema left greater than right    ED Course        Procedures  EKG normal sinus rhythm rate 83, axis intervals normal, no acute ST or T wave changes, read by Dr. Roberto Irving             Critical Care time:  none               Results for orders placed or performed during the hospital encounter of 20 (from the past 24 hour(s))   Care Transition RN/SW IP Consult    Narrative    Janet March RN   "   3/10/2020  1:54 PM  Care Transition Initial Assessment - RN      Met with: Patient.    DATA  Principal Problem:    Vomiting and diarrhea  Active Problems:    Essential hypertension, benign    Restrictive lung disease    Chronic obstructive pulmonary disease, unspecified COPD type   (H)    CKD (chronic kidney disease) stage 3, GFR 30-59 ml/min (H)    S/P carotid endarterectomy    Mild cognitive impairment    Morbid obesity (H)    Chronic diastolic congestive heart failure (H)    History of stroke    Hyperlipidemia    Normocytic anemia    Coronary artery disease of native heart with stable angina   pectoris, unspecified vessel or lesion type (H)    Chronic pain syndrome       Cognitive Status: awake, alert and oriented.  [facility] Clinic [facility]  Primary Care MD Name: Delores  Contact information and PCP information verified: Yes  Lives With: other (see comments)(group home)   Living Arrangements: assisted living  Quality of Family Relationships: supportive, involved, helpful  Description of Support System: Supportive, Involved   Who is your support system?: Children   Support Assessment: Adequate family and caregiver support   Insurance concerns: No Insurance issues identified        This writer met with pt, introduced self and role. Patient   [facility] resides at [facility] (phone: [phone]   Fax: [phone]).  She is open with [facility]   Phone: [phone] for RN.  She is wheel-chair bound at   baseline.  She receives help with all ADLs.  She self transfers   from her chair to bed and bathroom.  She recently received a   transfer pole in her apartment to assist transfers.  She states   it is NOT helpful due to her chronic shoulder pain.  Discussed   discharge planning and medicare guidelines in regards to home   care and SNF benefits.  Patient reports a goal of getting   stronger.  She feels safe to return to her group home, but would like PT   and OT via home care added.   Resumption of home care RN and added   PT/OT order placed.  Confirmed with  home care they can manage   the INR checks at discharge.       Spoke with sonChi via phone with discharge plans per patient   request.  He is in agreement and will plan to transport patient   home upon discharge.      Patient is managed by LIV Peña  Partner Care   coordinator.  See summary of services below.  Email sent   regarding transfer pole and discharge plan.  Mirian has planned   annual visit this week.        PLAN    Return to [facility]    SOLE Clements RN  Inpatient Care Coordinator  [facility] [phone]  [facility] [phone]    HOME CARE HAND OFF  Patient Name: Jazmin Clifton    MRN: [id number]    : 1932    Patient Zip Code: [address]    Admit Diagnosis: Gastroenteritis [K52.9]      Services Pt Needs at Home: RN-resumption, added PT and OT    Discharge Support: Independent-Alone    Living Arrangements: Assisted living     or Address Other Than Pt: No    Wound Care: No    Anticipate DC Date: 3/11/2020      [facility] CARE PLAN SUMMARY  ?   Member Name:  Jazmin Clifton  ?   Address:  [facility]  [address] [place] [address] [address] [place] [address]  ?   As of 19 (moved to another apt)  Phone: [phone] (home)      :  1932 Date of Assessment: 19  Change in condition visit    Health Plan:  [facility] [facility]  Health Plan Number: [id number] Medical Assistance   Number: [id number]  Financial Worker:  [facility]   Case #:     [facility] Care Coordinator:  Mirian Weldon MA, LIV CC Phone:    [phone]  CC Fax:  [phone]   FVP Enrollment Date: 19 Case Mix:  H  Rate Cell:  E  Waiver Type: EW     Primary Emergency Contact:  EtienneChi  Address: [address] [place] [address]  Mobile Phone: [phone]  Relation: Son  ?   Secondary Emergency Contact: Lucius Clifton  Address: [address] [facility] [place]  "[place] [address]   Home Phone: [phone]  Work Phone: [phone]  Relation: Son Language:  English  :  No   Health Care Agent/POA:   Advanced Directives/Living Will:     Primary Care Clinic/Phone/Fax:  [facility]/(p) [phone], (f) [phone]   Primary Dx:  COPD J44.9  Secondary Dx: DDD M51.36  Cognitive Impairment G31.84   Primary Physician:  Junie Estrella  ?  Height:  5' 3\"  Weight:  188 lbs   Specialty Physician:    Audiologist:     Eye Care Provider:   Dental Care Provider:  [facility]   [facility]: [facility] [phone]   Other:   ?    ?   [facility] CURRENT SERVICES SUMMARY  Equipment owned/DME history:  Member son purchased electric   wheelchair for member;    Member has scooter, lift chair, 3 wheeled walker   SERVICE TYPE/PROVIDER NAME/PHONE AUTH DATE FREQUENCY Units OR $   Amt DESCRIPTION   Medical Transportation: [facility] [phone]  Fax:  4-1-19 thru 3-30-20 review annually/PRN  as needed ?  ?    Assisted Living: [facility] [address] (Assisted Living)   [phone] 24 hr Customized Living  Fax:  4-1-19 thru 3-30-20 review annually/PRN daily See RS/AL   Tool ?    ?   Supplies: [facility] [phone]  Fax:  4-1-19 thru 3-30-20 review annually/PRN  ?   ?   2-7-20 thru 4-   Monthly  ?   ?   ?   ?   ?   One time order  1 pull up per day   1 liner per day  ?   ?   ?   189.00  ?   75.00  XL pull ups   ?   Regular liners   ?   ?   ?   Transfer pole with handles   ?   Installation    ?   Abraham ESPARZA    [phone]  ?  5-1-19 thru 3-30-20  as needed ?  Declined 6-1-19   ?   HHA/RN: [facility] [phone]  Fax:  12-12-19 thru 12-19-19  Review annually/PRN   weekly ?  2   HHA visits per HC POC          CBC with platelets   Result Value Ref Range    WBC 4.9 4.0 - 11.0 10e9/L    RBC Count 3.41 (L) 3.8 - 5.2 10e12/L    Hemoglobin 8.8 (L) 11.7 - 15.7 g/dL    Hematocrit 30.2 (L) 35.0 - 47.0 %    MCV " 89 78 - 100 fl    MCH 25.8 (L) 26.5 - 33.0 pg    MCHC 29.1 (L) 31.5 - 36.5 g/dL    RDW 14.4 10.0 - 15.0 %    Platelet Count 238 150 - 450 10e9/L   Comprehensive metabolic panel   Result Value Ref Range    Sodium 144 133 - 144 mmol/L    Potassium 4.1 3.4 - 5.3 mmol/L    Chloride 113 (H) 94 - 109 mmol/L    Carbon Dioxide 27 20 - 32 mmol/L    Anion Gap 4 3 - 14 mmol/L    Glucose 122 (H) 70 - 99 mg/dL    Urea Nitrogen 25 7 - 30 mg/dL    Creatinine 1.05 (H) 0.52 - 1.04 mg/dL    GFR Estimate 48 (L) >60 mL/min/[1.73_m2]    GFR Estimate If Black 55 (L) >60 mL/min/[1.73_m2]    Calcium 9.6 8.5 - 10.1 mg/dL    Bilirubin Total 0.3 0.2 - 1.3 mg/dL    Albumin 3.0 (L) 3.4 - 5.0 g/dL    Protein Total 6.1 (L) 6.8 - 8.8 g/dL    Alkaline Phosphatase 108 40 - 150 U/L    ALT 12 0 - 50 U/L    AST 10 0 - 45 U/L   INR   Result Value Ref Range    INR 1.13 0.86 - 1.14       Medications   acetaminophen (TYLENOL) tablet 1,000 mg (1,000 mg Oral Given 3/10/20 1404)   aspirin (ASA) chewable tablet 81 mg (81 mg Oral Given 3/10/20 0828)   atorvastatin (LIPITOR) tablet 40 mg (40 mg Oral Given 3/9/20 1921)   fluticasone-vilanterol (BREO ELLIPTA) 100-25 MCG/INH inhaler 1 puff (1 puff Inhalation Given 3/10/20 0827)   calcium carbonate (TUMS) chewable tablet 500 mg (has no administration in time range)   DULoxetine (CYMBALTA) DR capsule 30 mg (30 mg Oral Given 3/9/20 2136)   DULoxetine (CYMBALTA) DR capsule 60 mg (60 mg Oral Given 3/10/20 0828)   furosemide (LASIX) tablet 20 mg (20 mg Oral Given 3/10/20 0828)   HYDROmorphone (DILAUDID) tablet 2 mg (has no administration in time range)   hypromellose-dextran (ARTIFICAL TEARS) 0.1-0.3 % ophthalmic solution 1 drop (has no administration in time range)   lisinopril (ZESTRIL) tablet 20 mg (20 mg Oral Given 3/10/20 0828)   NIFEdipine ER OSMOTIC (PROCARDIA XL) 24 hr tablet 60 mg (60 mg Oral Given 3/10/20 0829)   omeprazole (priLOSEC) CR capsule 40 mg (40 mg Oral Given 3/10/20 0828)   oxyCODONE (oxyCONTIN) 12  hr tablet 10 mg (10 mg Oral Given 3/10/20 1012)   lidocaine 1 % 0.1-1 mL (has no administration in time range)   lidocaine (LMX4) kit (has no administration in time range)   sodium chloride (PF) 0.9% PF flush 3 mL (has no administration in time range)   sodium chloride (PF) 0.9% PF flush 3 mL (3 mLs Intracatheter Given 3/10/20 1011)   naloxone (NARCAN) injection 0.1-0.4 mg (has no administration in time range)   melatonin tablet 1 mg (has no administration in time range)   ondansetron (ZOFRAN-ODT) ODT tab 4 mg (has no administration in time range)     Or   ondansetron (ZOFRAN) injection 4 mg (has no administration in time range)   prochlorperazine (COMPAZINE) injection 5 mg (has no administration in time range)     Or   prochlorperazine (COMPAZINE) tablet 5 mg (has no administration in time range)     Or   prochlorperazine (COMPAZINE) Suppository 12.5 mg (has no administration in time range)   Lidocaine (LIDOCARE) 4 % Patch 1 patch (1 patch Transdermal Patch/Med Applied 3/10/20 0829)   gabapentin (NEURONTIN) capsule 200 mg (200 mg Oral Given 3/10/20 0828)     Followed by   gabapentin (NEURONTIN) capsule 100 mg (has no administration in time range)   lidocaine patch in PLACE ( Transdermal Patch in Place 3/10/20 1527)   predniSONE (DELTASONE) tablet 40 mg (40 mg Oral Given 3/10/20 0828)   miconazole (MICATIN) 2 % powder ( Topical Given 3/10/20 1319)   enoxaparin ANTICOAGULANT (LOVENOX) injection 40 mg (40 mg Subcutaneous Given 3/10/20 1023)   Warfarin Therapy Reminder (Check START DATE - warfarin may be starting in the FUTURE) (has no administration in time range)   levalbuterol (XOPENEX) neb solution 1.25 mg (1.25 mg Nebulization Given 3/10/20 1602)   azithromycin (ZITHROMAX) 500 mg in sodium chloride 0.9 % 250 mL intermittent infusion (500 mg Intravenous New Bag 3/10/20 1114)   warfarin ANTICOAGULANT (COUMADIN) tablet 2.5 mg (has no administration in time range)   0.9% sodium chloride BOLUS (0 mLs Intravenous  Stopped 3/9/20 1357)   ondansetron (ZOFRAN) injection 4 mg (4 mg Intravenous Given 3/9/20 1256)   iopamidol (ISOVUE-370) solution 91 mL (91 mLs Intravenous Given 3/9/20 1325)   sodium chloride 0.9 % bag 500mL for CT scan flush use (63 mLs Intravenous Given 3/9/20 1325)   cefTRIAXone in d5w (ROCEPHIN) intermittent infusion 1 g (1 g Intravenous New Bag 3/9/20 1716)   azithromycin (ZITHROMAX) 500 mg in sodium chloride 0.9 % 250 mL intermittent infusion (500 mg Intravenous New Bag 3/9/20 1933)       12:16 PM Patient assessed.     Assessments & Plan (with Medical Decision Making)  87-year-old female from assisted living at [facility] presents secondary to generalized weakness, nausea vomiting and diarrhea details per HPI.  No recent travel or antibiotics.  Found to have hypoxia on room air incidentally, history of congestive failure and COPD.  Responded well to 2 L oxygen nasal cannula with sats in the mid 90s.  Chest x-ray by my review is unremarkable, by radiology was reviewed question mild infiltrate left lower lung.  Patient is without fever or white count.  She does have chronic cough.  Rapid flu swab is negative.  CT abdomen pelvis indicated given vomiting, diarrhea, generalized abdominal tenderness and 87-year-old female.  This is negative for any acute finding by my review as well as radiology read.  With respect to lab work-up her CBC is significant for white count at 8.1, hemoglobin 9.4 which is baseline, platelet count normal 261.  Chemistries unremarkable/baseline, lactate normal at 1.3.  Patient is treated with IV fluid bolus given nausea vomiting diarrhea.  Stool studies ordered and pending.  She has generalized weakness, is currently self assist, too weak to accomplish ADLs.  Admit for ongoing fluid resuscitation in the context of history congestive failure.  Hypoxic on room air, question COPD exacerbation, question left lung base opacification per radiology's read.  No white count no fever no lactate.   Patient requires admission secondary to generalized weakness, discussed with Alexa Lombardi, will treat with Rocephin azithromycin given question left lower lung field infiltrate.  Believe overall condition likely secondary to age, COPD exacerbation in the context of gastroenteritis.  Remained stable throughout stay.     I have reviewed the nursing notes.    I have reviewed the findings, diagnosis, plan and need for follow up with the patient.          Current Discharge Medication List          Final diagnoses:   Gastroenteritis     This document serves as a record of the services and decisions personally performed and made by Roberto Irving MD. It was created on his behalf by Yady Lo, a trained medical scribe. The creation of this document is based the provider's statements to the medical scribe.  Yady Lo 12:26 PM 3/9/2020    Provider:   The information in this document, created by the medical scribe for me, accurately reflects the services I personally performed and the decisions made by me. I have reviewed and approved this document for accuracy prior to leaving the patient care area.  Roberto Irving MD 12:26 PM 3/9/2020     3/9/2020   [facility] EMERGENCY DEPARTMENT     Roberto Irving MD  03/10/20 0192

## 2020-03-09 NOTE — ED NOTES
No vomiting or diarrhea since arrival to ED. Pt has coughing episodes with moderate amount of yellow sputum. SPO2 down to 87% RA and tolerates 1-2 liters nasal cannula to maintain SPO2 above 92%.

## 2020-03-09 NOTE — H&P
"[facility]    History and Physical - Hospitalist Service       Date of Admission:  3/9/2020    Assessment & Plan   Jazmin Clifton is a 87 year old female admitted on 3/9/2020. She a complex medical history of COPD/?restrictive lung disease, congestive heart failure, ?coronary artery disease, chronic pain, cerebrovascular disease, history of brain aneurysm s/p clipping, CKD, anemia, and ?DVT. She presents with coughing and emesis.    Emesis, diarrhea  6 days of coughing episodes with emesis and few days of diarrhea. Vague historian states she is coughing and \"throwing up phlegm\" but also nauseous, poor intake, unable to keep much down. 3 loose stools per day. Abdominal discomfort associated with emesis. CT abdomen/pelvis without acute findings. CXR shows possible left lower lobe infiltrate. Labs show signs of mild dehydration with increased Na, Cl and mildly elevated creatinine. Suspect gastroenteritis given symptoms. Respiratory etiology also considered, aside from cough, no clear shortness of breath.  - nausea protocol  - encourage oral intake, plan to order low maintenance dose of IVF if not tolerating PO (eating dinner and drinking on admit)  - monitor symptoms, if significant stool output will obtain stool studies    Hypoxemia, cough  Noted oxygenation of 86% in the ED. Started on 1-2 LPM with improvement to the upper 90s. Reported cough, though as above difficult to tell if this is related to respiratory infection vs emesis based on patient description. Lungs diminished, no significant wheeze. CXR shows possible left lower lobe infiltrate. Received ceftriaxone and azithromycin in ED to cover for possible community acquired pneumonia. Lower suspicion currently for pneumonia without clear fever or leukocytosis. COPD exacerbation possible though no apparent wheeze. Low suspicion for CHF exacerbation, appear euvolemic or somewhat dehydrated. Will monitor patient overnight with scheduled " nebs and increased steroid dose initially.  - AM CBC  - defer further antibiotics until reassessment in AM  - steroids: prednisone 40 mg daily  - nebulizer: duonebs Q4 hours while awake  - incentive spirometry  - continue home COPD medications   - titrate oxygen to oxygenation goal of > 89%     Mildly elevated creatinine on CKD  Creatinine 1.18, GFR 41. Recent baselines: creatinine 0.9-1.07, GFR ~48-58. Slightly worse than recent baseline, likely related to poor intake with nausea/emesis.   - AM BMP   - encourage oral intake initially with nausea medications available.     Irregular heartbeat  Occasional irregularity noted on cardiac exam. ECG reviewed, shows possible atrial fib/flutter though some p waves appear present, may have some artifact. Plan to watch on telemetry to further evaluate. No prior history of arrhythmia.  - telemetry     COPD, restrictive lung disease  Lungs diminished bilaterally in the bases. No PFTs on file, both restrictive and obstructive lung disease noted in history.  - continue home Breo Ellipta, Xopenex nebs prn    Chronic Diastolic Congestive Heart Failure   Appears euvolemic on exam on exam. Possible mild pulmonary vasculature on CXR, though poor inspiration. Current weight of 187 lb, which is stable. Last echocardiogram on 2/2019 demonstrated an EF of 55-60% and normal LV, normal RV, mild left atrial dilation, mild atrial root dilation and moderate diastolic dysfunction.  - Continue home furosemide daily   - Follow daily weights  - Judicious use of IVF     Coronary Artery Disease  Noted in history though unclear diagnosis, no prior cardiac testing on file to verify or notes from cardiology.  - continue home ASA, statin     Chronic pain, right hip, low back, left shoulder  History of pelvic fracture  Follows with pain medicine, managed on acetaminophen, Cymbalta, gabapentin, oxycodone, and tizanidine. Planning on weaning off gabapentin per last [facility] note. Using low dose of prednisone  "for shoulder pain.  - continue home Oxycotin, dilaudid prn, prednisone 5 mg daily, gabapentin, duloxetine     Hypertension  Chronic. Blood pressures reviewed, stable.   - continue home lisinopril, furosemide, nifedipine     Hyperlipidemia  Chronic.  - continue home atorvastatin     History of CVA/TIA  S/P Carotid Endarterectomy 2017  Brain aneurysm s/p clipping  History of CVA 2017 s/p endarterectomy in 2017 Previous clipping of aneurysm.  - continue home statin, aspirin    Gastroesophageal reflux disease  Chronic.  - continue home omeprazole    Chronic Normocytic Anemia  Hgb 9.4. Baseline recently ~9-10. No longer on home iron.  - AM CBC      History of DVT  Noted in history though cannot find clear documentation of this. Do see notes indicating DVT prophylaxis following surgery as well as history of superficial thrombophlebitis.     Diet: Regular adult diet  DVT Prophylaxis: Low Risk/Ambulatory with no VTE prophylaxis indicated  Maldonado Catheter: in place, indication:    Code Status: DNR/DNI    Disposition Plan   Expected discharge: 2 - 3 days, recommended to prior living arrangement vs TCU pending recovery once tolerating adequate oral intake, oxygenation improving and no further barriers identified.  Entered: Alexa Mclaughlin PA-C 03/09/2020, 4:38 PM     The patient's care was discussed with the Attending Physician, Dr. Kenton Blackwood and Patient.    Alexa Mclaughlin PA-C  [facility]    _____________________________________________________________________    Chief Complaint   Emesis, cough    History is obtained from the patient    History of Present Illness   Jazmin Clifton is a 87 year old female who presents with emesis and cough.    She states about a week ago she woke up with a cough and then later that day started \"throwing up phlegm\". She has been nauseous, decreased her intake. She would try to eat something but it would cause her to start coughing and ultimately throw up. " "Initially she was able to keep liquids down but not any more. She reports she has not eaten much of anything. Surrounding the coughing/emesis episodes she would have pain in her lower abdomen and also at times feel feverish/chilled. She developed diarrhea over the past couple of days. She has been having about 3 per day, described as formed but loose stools.    Patient denies sore throat, shortness of breath, palpitations, chest pain, changes in urination, rash or wounds.    Review of Systems    The 10 point Review of Systems is negative other than noted in the HPI or here.     Past Medical History    I have reviewed this patient's medical history and updated it with pertinent information if needed.   Past Medical History:   Diagnosis Date     ABDOMINAL PAIN GENERALIZED 3/15/2006    1 month of abdominal pain that bryn radiates into her back and chest.  Pt was hospitilzed 2x for the pain at [facility] --pt hopsitized for 3 days.  Rcords not ab=vailable.  She was told either \" twisted intestine or blockage of bowel.  Pt feels it is the hiatal hernia.  Pt hospitilized again a second time  A month ago.  Ct scans x 2 showed \" nothing.\"  Pt had a barium and xrays.  Pt sa     Abdominal pain, generalized 3/15/2006    1 month of abdominal pain that bryn radiates into her back and chest.  Pt was hospitilzed 2x for the pain at [facility] --pt hopsitized for 3 days.  Rcords not ab=vailable.  She was told either \" twisted intestine or blockage of bowel.  Pt feels it is the hiatal hernia.  Pt hospitilized again a second time  A month ago.  Ct scans x 2 showed \" nothing.\"  Pt had a barium and xrays.  Pt sa     Atherosclerosis of renal artery (H)     Left renal artery stenosis     BENIGN HYPERTENSION 5/1/2006 5/1/2006   Increase catapres to 0.3 mg and decrease clonidine to 0.1 mg daily.  Recheck bp in 1 month.      Benign neoplasm of scalp and skin of neck     Seborrheic keratosis     Cerebral aneurysm, " nonruptured     Cerebral Aneurysm     Congestive heart failure (H)      Depressive disorder, not elsewhere classified     Depression     Esophageal reflux     Gastroesophageal Reflux Disease     Female stress incontinence      Gastrointestinal malfunction arising from mental factors     Dyspepsia     Generalized osteoarthrosis, unspecified site     DJD-chronic back pain     Herpes zoster dermatitis of eyelid      Insomnia, unspecified      Lumbago     Chronic back pain     Other chest pain     Atypical Chest Pain     Other specified cardiac dysrhythmias(427.89)     Bradycardia, improved on lower dose beta blockers      Rectocele     Grade 3     Unspecified disorder of kidney and ureter     Renal insufficiency     Unspecified essential hypertension        Past Surgical History   I have reviewed this patient's surgical history and updated it with pertinent information if needed.  Past Surgical History:   Procedure Laterality Date     CHOLECYSTECTOMY, LAPOROSCOPIC  1997    Cholecystectomy, Laparoscopic     CYSTOSCOPY, BIOPSY URETHRA N/A 6/10/2019    Procedure: Cystoscopy With Biopsy, Removal Of Urethral Lesion;  Surgeon: Yesy Fong MD;  Location: [facility] OR     ENDARTERECTOMY CAROTID Right 8/31/2017    Procedure: ENDARTERECTOMY CAROTID;  RIGHT CAROTID ENDARTERECTOMY WITH EEG;  Surgeon: Hilario Parry MD;  Location: [facility] OR     HYSTERECTOMY, RAYMOND  1982    oophorectomy,RAYMOND     SURGICAL HISTORY OF -       Laminectomy x 3     SURGICAL HISTORY OF -       MCA Aneurysm repair     SURGICAL HISTORY OF -   1996    Bladder suspension (Bladder Repair x2)     SURGICAL HISTORY OF -       Bilateral Hand Surgeries for Arthritis x2     SURGICAL HISTORY OF -       Repair of a Cerebral Aneurysm     URETHROPLASTY N/A 6/10/2019    Procedure: Urethral Reconstruction;  Surgeon: Yesy Fong MD;  Location: [facility] OR     Social History   I have reviewed this patient's social history and updated it with pertinent information if  needed.  Social History     Tobacco Use     Smoking status: Former Smoker     Years: 50.00     Last attempt to quit: 1992     Years since quittin.2     Smokeless tobacco: Never Used   Substance Use Topics     Alcohol use: Not Currently     Comment: wine occas.     Drug use: No     Family History   I have reviewed this patient's family history and updated it with pertinent information if needed.   Family History   Problem Relation Age of Onset     Cancer Mother         stomache     Cerebrovascular Disease Father      Heart Disease Father      Cancer Brother         lymphoma     Eye Disorder Son      Asthma Son      Diabetes Son      Gastrointestinal Disease Son         no control over bladder or bowels     C.A.D. No family hx of      Hypertension No family hx of      Breast Cancer No family hx of      Cancer - colorectal No family hx of      Prostate Cancer No family hx of        Prior to Admission Medications   Prior to Admission Medications   Prescriptions Last Dose Informant Patient Reported? Taking?   ACETAMINOPHEN EXTRA STRENGTH 500 MG tablet   No Yes   Sig: TAKE TWO TABLETS (1000MG) BY MOUTH THREE TIMES DAILY   Patient taking differently: Take 1,000 mg by mouth 3 times daily    ASPIRIN LOW DOSE 81 MG chewable tablet   No Yes   Sig: CHEW AND SWALLOW ONE TABLET BY MOUTH ONCE DAILY   Patient taking differently: Take 81 mg by mouth daily    BREO ELLIPTA 100-25 MCG/INH inhaler   No Yes   Sig: INHALE 1 PUFF BY MOUTH ONCE DAILY   Patient taking differently: Inhale 1 puff into the lungs daily    Blood Glucose Monitoring Suppl [facility]  Nursing Home Yes No   Si times daily Call nurse for further directions if blood glucose is less than 80 or greater than 250   DOK PLUS 50-8.6 MG tablet  Nursing Home No No   Sig: TAKE 1 TABLET BY MOUTH TWICE DAILY   DULoxetine (CYMBALTA) 30 MG capsule   No No   Sig: TAKE 1 CAPSULE BY MOUTH AT BEDTIME   DULoxetine (CYMBALTA) 60 MG capsule   No No   Sig: TAKE 1 CAPSULE BY MOUTH  EVERY MORNING   Emollient (MOISTURIZING LOTION EX)  Nursing Home Yes No   Sig: Externally apply topically 2 times daily Bilateral lower extremeties   HYDROmorphone (DILAUDID) 2 MG tablet   Yes No   Sig: Take 2 mg by mouth 2 times daily as needed for severe pain   Lidocaine (LIDOCARE) 4 % Patch  Nursing Home No Yes   Sig: Place 1 patch onto the skin daily To desired area (shoulder or hip). On for 12hrs, off for 12hrs   Menthol, Topical Analgesic, (BIOFREEZE) 4 % GEL   Yes Yes   Sig: Externally apply topically 4 times daily as needed To left shoulder and right hip   NIFEdipine ER (ADALAT CC) 60 MG 24 hr tablet  Nursing Home Yes No   Sig: Take 60 mg by mouth daily    OXYCONTIN 10 MG 12 hr tablet   No No   Sig: TAKE ONE TABLET BY MOUTH EVERY 12 HOURS   VITAMIN D3 25 MCG (1000 UT) tablet   No No   Sig: TAKE 1 TABLET BY MOUTH ONCE DAILY   atorvastatin (LIPITOR) 40 MG tablet   No Yes   Sig: TAKE 1 TABLET BY MOUTH ONCE DAILY   Patient taking differently: Take 40 mg by mouth daily    benzocaine (ORAJEL/ANBESOL) 10 % gel   Yes No   Sig: Take by mouth 4 times daily as needed for moderate pain   bisacodyl (DULCOLAX) 10 MG suppository   Yes Yes   Sig: Place 10 mg rectally daily as needed for constipation   calcium carbonate (TUMS) 500 MG chewable tablet   Yes No   Sig: Take 1 chew tab by mouth 3 times daily as needed   furosemide (LASIX) 20 MG tablet  Nursing Home No No   Sig: TAKE 1 TABLET BY MOUTH ONCE DAILY   gabapentin (NEURONTIN) 100 MG capsule   No No   Sig: Take 2 capsules (200 mg) by mouth daily for 7 days, THEN 1 capsule (100 mg) daily for 7 days.   hypromellose-dextran (GENTEAL TEARS) 0.1-0.3 % ophthalmic solution   Yes No   Sig: Place 1 drop into both eyes every 6 hours as needed for dry eyes   levalbuterol (XOPENEX) 1.25 MG/3ML neb solution  Nursing Home Yes No   Sig: Take 1 ampule by nebulization every 4 hours as needed for shortness of breath / dyspnea or wheezing And every 4 hours PRN   lisinopril  (PRINIVIL/ZESTRIL) 20 MG tablet   No No   Sig: Take 1 tablet (20 mg) by mouth daily   magnesium hydroxide (MILK OF MAGNESIA) 400 MG/5ML suspension   Yes No   Sig: Take 30 mLs by mouth daily as needed for constipation or heartburn   nystatin (MYCOSTATIN) 623596 UNIT/GM external powder   No No   Sig: Apply topically 3 times daily   omeprazole (PRILOSEC) 40 MG DR capsule   No No   Sig: TAKE 1 CAPSULE BY MOUTH ONCE DAILY   ondansetron (ZOFRAN) 4 MG tablet   Yes No   Sig: Take 4 mg by mouth every 6 hours as needed for nausea   predniSONE (DELTASONE) 5 MG tablet   No No   Sig: TAKE 1 TABLET BY MOUTH ONCE DAILY   tiZANidine (ZANAFLEX) 2 MG tablet  Nursing Home Yes No   Sig: Take 2 mg by mouth every 8 hours as needed       Facility-Administered Medications: None     Allergies   Allergies   Allergen Reactions     Accupril Other (See Comments)     Dizziness       Ace Inhibitors Unknown     Accupril - dizziness     Augmentin Unknown     Blood-Group Specific Substance Other (See Comments)     Patient has a Non-specific antibody. Blood Product orders may be delayed.  Draw one red top and two purple top tubes for ALL Type and Screen/ Type and Crossmatch orders.      Levofloxacin Other (See Comments) and Muscle Pain (Myalgia)     Pt prescribed ciprofloxacin in Jan 2018 (no rxn documented)         Morphine Other (See Comments)     hallucinations     Nitrofurantoin Nausea and Vomiting     Norvasc [Amlodipine Besylate] Swelling     Leg swelling       Quinapril Other (See Comments) and Unknown     Hypertension. 3.29.18 - Takes and tolerates lisinopril           Physical Exam   Vital Signs: Temp: 97.9  F (36.6  C) Temp src: Temporal BP: (!) 151/86 Pulse: 71   Resp: 18 SpO2: 97 % O2 Device: Nasal cannula Oxygen Delivery: 1 LPM  Weight: 187 lbs 0 oz    Constitutional: sitting up comfortably in bed, awake, alert, cooperative, no apparent distress, and appears stated age  Eyes: Lids and lashes normal, extra ocular muscles intact, sclera  clear, conjunctiva normal  ENT: Normocephalic, without obvious abnormality, atraumatic, oral pharynx with moist mucous membranes.  Hematologic / Lymphatic: no cervical lymphadenopathy and no supraclavicular lymphadenopathy  Respiratory: No increased work of breathing, slightly diminished in the bases, somewhat poor air exchange, clear to auscultation bilaterally, no crackles or wheezing  Cardiovascular: regular rate and occasional irregularity in rhythm, and no murmur noted. No lower extremity edema.  GI: Normal bowel sounds, soft, non-distended, mild tenderness to palpation in the left upper quadrant and right lower quadrant without rebound or guarding.  Genitounirinary: deferred  Skin: normal skin color, texture, turgor  Musculoskeletal: normal bulk and tone. Moves all 4 extremities appropriately.   Neurologic: Awake, alert, oriented to name, place and time.    Neuropsychiatric: calm, pleasant, cooperative, appropriate thought process/content    Data   Data reviewed today: I reviewed all medications, new labs and imaging results over the last 24 hours. I personally reviewed the chest x-ray image(s) showing low lung volumes making interpretation difficult, no clear infiltrate noted.    Recent Labs   Lab 03/09/20  1251   WBC 8.1   HGB 9.4*   MCV 89      *   POTASSIUM 3.9   CHLORIDE 112*   CO2 27   BUN 31*   CR 1.18*   ANIONGAP 6   BLAIR 9.8   GLC 96   ALBUMIN 3.2*   PROTTOTAL 6.2*   BILITOTAL 0.4   ALKPHOS 119   ALT 14   AST 12   LIPASE 52*   TROPI 0.033     Recent Results (from the past 24 hour(s))   CT Abdomen Pelvis w Contrast    Narrative    CT ABDOMEN/PELVIS WITH CONTRAST March 9, 2020 1:35 PM    CLINICAL HISTORY: Diarrhea and vomiting, abdominal tenderness.    TECHNIQUE: CT scan of the abdomen and pelvis was performed following  injection of IV contrast. Multiplanar reformats were obtained. Dose  reduction techniques were used.    CONTRAST: Isovue 370, 91 mL    COMPARISON: CT abdomen and pelvis  9/20/2019.    FINDINGS:   LOWER CHEST: Normal.    HEPATOBILIARY: Cholecystectomy. No acute abnormality of the liver.    PANCREAS: Normal.    SPLEEN: Calcifications.    ADRENAL GLANDS: Normal.    KIDNEYS/BLADDER: There is an indeterminate exophytic anterior left  renal lesion that is stable measuring 1 cm series 5 image 74. This  appeared hyperdense on the prior exam. There are other cysts that are  unchanged that do not require specific imaging follow-up. There is  another complex lesion that is stable at the lower right kidney  measuring 1.1 cm series 5 image 96. No hydronephrosis.    BOWEL: No evidence for bowel obstruction or inflammation. Colonic  diverticula. No signs of appendicitis. Appendix not clearly seen.  Fat-containing stable ventral midline abdominal wall hernia measuring  5 cm series 2 image 55. No abscess or free air.    PELVIC ORGANS: Normal.    ADDITIONAL FINDINGS: Diffuse vascular calcifications including the  aorta, its branches, and the coronary arteries. There is a stable cyst  at the right inguinal location that is lobulated inferiorly. The  largest portion measures 8.6 x 6.1 cm series 5 image 210. Subacute  fractures of the left inferior pubic ramus, and right proximal sacrum  showing callus formation.    MUSCULOSKELETAL: Diffuse degenerative changes of the spine. Left hip  arthroplasty. Lumbosacral fusion changes.      Impression    IMPRESSION:   1.  No specific acute abnormality is seen.  2.  There are subacute fractures of the left inferior pubic ramus and  right sacrum with a degree of healing.  3.  Stable ventral midline fat-containing abdominal wall hernia.  4.  Stable cyst at the right inguinal location may be a lymphoma or  seroma.  5.  There are indeterminate but stable bilateral renal lesions.  Recommend correlation with nonurgent MRI.  6.  Diffuse vascular calcifications.    VEENA BARCENAS MD   Chest XR,  PA & LAT    Narrative    CHEST TWO VIEWS 3/9/2020 3:02 PM    HISTORY:  Cough.    COMPARISON: 12/26/2019.    FINDINGS: Cardiac enlargement. Pulmonary vascularity is within normal  limits. Question some mild hazy increased opacity left lower lung with  infiltrate not excluded. Clinical correlation. Mild linear scarring or  atelectasis right lower lung. Benign calcified granuloma left upper  lobe laterally. Elevation right hemidiaphragm. No significant bony  abnormalities. Chest is otherwise negative.      Impression    IMPRESSION: Question some mild infiltrate left lower lung. Clinical  correlation.    BRITTNEE DAVIS MD

## 2020-03-09 NOTE — PROGRESS NOTES
Skin affirmation note    Admitting nurse completed full skin assessment, Will score and Will interventions. This writer agrees with the initial skin assessment findings.      Rash noted under bilateral breasts, and groin creases. Bruise noted on left hip.

## 2020-03-09 NOTE — PROGRESS NOTES
[facility] Care Coordination Contact    [facility] delivered. CC notified.    Jazmin Clifton 12/30/1932 transfer pole & Install 2/7/2020 Delivered     Arlen Lang  Care Management Specialist  [facility]  [phone]

## 2020-03-10 NOTE — CONSULTS
Care Transition Initial Assessment - RN      Met with: Patient.    DATA  Principal Problem:    Vomiting and diarrhea  Active Problems:    Essential hypertension, benign    Restrictive lung disease    Chronic obstructive pulmonary disease, unspecified COPD type (H)    CKD (chronic kidney disease) stage 3, GFR 30-59 ml/min (H)    S/P carotid endarterectomy    Mild cognitive impairment    Morbid obesity (H)    Chronic diastolic congestive heart failure (H)    History of stroke    Hyperlipidemia    Normocytic anemia    Coronary artery disease of native heart with stable angina pectoris, unspecified vessel or lesion type (H)    Chronic pain syndrome       Cognitive Status: awake, alert and oriented.  Primary Care Clinic Name: [facility]  Primary Care MD Name: Delores  Contact information and PCP information verified: Yes  Lives With: other (see comments)(half-way)   Living Arrangements: assisted living  Quality of Family Relationships: supportive, involved, helpful  Description of Support System: Supportive, Involved   Who is your support system?: Children   Support Assessment: Adequate family and caregiver support   Insurance concerns: No Insurance issues identified        This writer met with pt, introduced self and role. Patient currently resides at [facility] (phone: [phone] Fax: [phone]).  She is open with [facility] Phone: [phone] for RN.  She is wheel-chair bound at baseline.  She receives help with all ADLs.  She self transfers from her chair to bed and bathroom.  She recently received a transfer pole in her apartment to assist transfers.  She states it is NOT helpful due to her chronic shoulder pain.  Discussed discharge planning and medicare guidelines in regards to home care and SNF benefits.  Patient reports a goal of getting stronger.  She feels safe to return to her half-way, but would like PT and OT via home care added.  Resumption of home care RN and added PT/OT order  placed.  Confirmed with  home care they can manage the INR checks at discharge.       Spoke with sonChi via phone with discharge plans per patient request.  He is in agreement and will plan to transport patient home upon discharge.      Patient is managed by LIV Peña  Partner Care coordinator.  See summary of services below.  Email sent regarding transfer pole and discharge plan.  Mirian has planned annual visit this week.        PLAN    Return to [facility] w/ Critical access hospital    SOLE Clements RN  Inpatient Care Coordinator  [facility] [phone]  [facility] [phone]    HOME CARE HAND OFF  Patient Name: Jazmin Clifton    MRN: [id number]    : 1932    Patient Zip Code: [address]    Admit Diagnosis: Gastroenteritis [K52.9]      Services Pt Needs at Home: RN-resumption, added PT and OT    Discharge Support: Independent-Alone    Living Arrangements: Assisted living     or Address Other Than Pt: No    Wound Care: No    Anticipate DC Date: 3/11/2020      [facility] SUMMARY     Member Name:  Jazmin Clifton     Address:  [address]  [facility] [address]     As of 19 (moved to another apt)  Phone: [phone] (home)    :  1932 Date of Assessment: 19  Change in condition visit    Health Plan:  [email or web address]  Health Plan Number: [id number] Medical Assistance Number: [id number]  Financial Worker:  [facility]   Case #:     [facility] Care Coordinator:  Mirian Weldon MA, LSW CC Phone:  [phone]  CC Fax:  [phone]   FV Enrollment Date: 19 Case Mix:  H  Rate Cell:  E  Waiver Type: EW     Primary Emergency Contact:  Etienne Chi  Address: [address] [place] [address]  Mobile Phone: [phone]  Relation: Son     Secondary Emergency Contact: Lucius Clifton  [place] [address] [facility] [place] [address]   Home Phone: [phone]  Work Phone: [phone]  Relation:  "Son Language:  English  :  No   Health Care Agent/POA:   Advanced Directives/Living Will:     Primary Care Clinic/Phone/Fax:  [facility]/(p) [phone], (f) [phone] Primary Dx:  COPD J44.9  Secondary Dx: DDD M51.36  Cognitive Impairment G31.84   Primary Physician:  Junie Estrella    Height:  5' 3\"  Weight:  188 lbs   Specialty Physician:    Audiologist:     Eye Care Provider:   Dental Care Provider:  [facility]   [facility] [phone]   Other:          [facility] CURRENT SERVICES SUMMARY  Equipment owned/DME history:  Member son purchased electric wheelchair for member;    Member has scooter, lift chair, 3 wheeled walker   SERVICE TYPE/PROVIDER NAME/PHONE AUTH DATE FREQUENCY Units OR $ Amt DESCRIPTION   Medical Transportation: [facility] [phone]  Fax:  4-1-19 thru 3-30-20 review annually/PRN  as needed       Assisted Living: [facility] (Assisted Living) [phone] 24 hr Customized Living  Fax:  4-1-19 thru 3-30-20 review annually/PRN daily See RS/AL Tool        Supplies: [facility] [phone]  Fax:  4-1-19 thru 3-30-20 review annually/PRN        2-7-20 thru 4-   Monthly                 One time order  1 pull up per day   1 liner per day           189.00     75.00  XL pull ups      Regular liners            Transfer pole with handles      Installation       Abraham ESPARZA    [phone]    5-1-19 thru 3-30-20  as needed   Declined 6-1-19      HHA/RN: [facility] [phone]  Fax:  12-12-19 thru 12-19-19  Review annually/PRN   weekly   2 HHA visits per HC POC         "

## 2020-03-10 NOTE — PROGRESS NOTES
"CLINICAL NUTRITION SERVICES  -  ASSESSMENT NOTE      RECOMMENDATIONS FOR MD/PROVIDER TO ORDER:   None   Recommendations Ordered by Registered Dietitian (RD):   Change diet order to regular/mechanical soft/thin liquids   Future/Additional Recommendations:   Continue to monitor and encourage PO intake    Malnutrition: Patient does not meet two of the above criteria necessary for diagnosing malnutrition       REASON FOR ASSESSMENT  Jazmin Clifton is a 87 year old female seen by Registered Dietitian for Admission Nutrition Risk Screen for unintentional loss of 10# or more in the past two months      NUTRITION HISTORY  Information obtained from EMR and IDT Rounds:  -Pt presented to ED with \"vomiting up phlegm for one week and now diarrhea for three days\"- per RN ED note  -\"6 days of coughing episodes with emesis and few days of diarrhea. Vague historian states she is coughing and \"throwing up phlegm\" but also nauseous, poor intake, unable to keep much down. 3 loose stools per day.\"- per PA-C note 3/9  -Hx of COPD, restrictive lung disease, CHF, CAD, anemia, and DVT  -[facility] at [facility]    Info obtained from pt:  -Pt reports that she felt ill for 5 days prior to admit. She reports that her appetite was poor during this time but she was still able to eat 100% of meals TID as long as they were made to be mechanical soft texture, d/t having teeth removed. She requests to have mechanical soft texture here (kitchen aware).   -Dietary aide delivered a mechanical soft tray during visit and pt reported that it looked good and was looking forward to eating it.     CURRENT NUTRITION ORDERS  Diet Order:     Regular     Current Intake/Tolerance:  None recorded in flowsheets      NUTRITION FOCUSED PHYSICAL ASSESSMENT FOR DIAGNOSING MALNUTRITION)  Yes            Observed:    No nutrition-related physical findings observed    Obtained from Chart/Interdisciplinary Team:  None    ANTHROPOMETRICS  Height: 5' 3\"  Weight: 185 lbs 6.51 " oz  Body mass index is 32.84 kg/m .  Weight Status:  Obesity Grade I BMI 30-34.9  IBW: 115 lb/52 kg  % IBW: 161%  Weight History:   Wt Readings from Last 20 Encounters:   03/10/20 84.1 kg (185 lb 6.5 oz)   03/02/20 85.5 kg (188 lb 6.4 oz)   01/07/20 85.3 kg (188 lb)   12/26/19 83.9 kg (184 lb 15.5 oz)   12/10/19 87.9 kg (193 lb 12.8 oz)   11/27/19 84.8 kg (187 lb)   11/23/19 85.3 kg (188 lb)   11/21/19 84.8 kg (187 lb)   11/18/19 85.3 kg (188 lb)   11/15/19 86.6 kg (191 lb)   11/14/19 87 kg (191 lb 12.8 oz)   11/13/19 87.5 kg (193 lb)   11/07/19 86.6 kg (191 lb)   11/02/19 88.1 kg (194 lb 3.6 oz)   10/17/19 87.1 kg (192 lb)   10/08/19 88.5 kg (195 lb)   09/24/19 88.6 kg (195 lb 6.4 oz)   09/16/19 88.4 kg (194 lb 12.8 oz)   09/13/19 87.7 kg (193 lb 6.4 oz)   09/10/19 87.1 kg (192 lb)   -Per chart above, pt has lost 8 lb/4.1% over the past 3 months    LABS  Chl 113 (H)  Creat 1.05 (H)  GFR 48 (L)  Albumin 3.0 (L)  Protein total 6.1 (L)   (H)  Hgb 8.8 (L)    MEDICATIONS  Lipitor  Cymbalta  Lasix  Prilosec  Oxycontin    ASSESSED NUTRITION NEEDS PER APPROVED PRACTICE GUIDELINES:    Dosing Weight 60 kg  Estimated Energy Needs: 6739-3483 kcals (25-30 Kcal/Kg)  Justification: obese  Estimated Protein Needs: 60-72 grams protein (1-1.2 g pro/Kg)  Justification: maintenance  Estimated Fluid Needs: 1 mL/Kcal  Justification: maintenance    MALNUTRITION:  % Weight Loss:  Up to 7.5% in 3 months (non-severe malnutrition)  % Intake:  No decreased intake noted  Subcutaneous Fat Loss:  None observed  Muscle Loss:  None observed  Fluid Retention:  None noted    Malnutrition Diagnosis: Patient does not meet two of the above criteria necessary for diagnosing malnutrition    NUTRITION DIAGNOSIS:  Chewing difficulty related to missing teeth as evidenced by need for mechanical soft textured diet and inability to chew tough foods      NUTRITION INTERVENTIONS  Recommendations / Nutrition Prescription  Change diet order to  regular/mechanical soft/thin liquids  Continue to monitor and encourage PO intake at meals  -If PO intake of meals is <50%, start a nutrition supplement    Implementation  Nutrition education: No education needs assessed at this time      Nutrition Goals  PO intake of >50% meals    MONITORING AND EVALUATION:  Progress towards goals will be monitored and evaluated per protocol and Practice Guidelines      Radha Sainz RD, LD  Clinical Dietitian  [facility]: [phone]  [facility]: [phone]

## 2020-03-10 NOTE — PLAN OF CARE
"[facility] ADMISSION NOTE    Patient admitted to room 2402 at approximately 1735 via cart from emergency room. Patient was accompanied by transport tech.     Verbal SBAR report received from Radha ELKINS prior to patient arrival.     Patient ambulated to bed with one assist. Patient alert and oriented X 3. The patient is not having any pain.  . Admission vital signs: Blood pressure (!) 148/64, pulse 85, temperature 97.5  F (36.4  C), temperature source Oral, resp. rate 18, height 1.6 m (5' 3\"), weight 87.7 kg (193 lb 5.5 oz), SpO2 (!) 88 %. Patient was oriented to plan of care, visiting hours.     Risk Assessment    The following safety risks were identified during admission: fall. Yellow risk band applied: YES.     Skin Initial Assessment    This writer admitted this patient and completed a full skin assessment and Will score in the Adult PCS flowsheet. Appropriate interventions initiated as needed.  Patient has bruises and redness in groin creases and under breasts.     Secondary skin check completed by Deidre OLGUIN RN.    Will Risk Assessment  Sensory Perception: 4-->no impairment  Moisture: 3-->occasionally moist  Activity: 2-->chairfast  Mobility: 3-->slightly limited  Nutrition: 3-->adequate  Friction and Shear: 2-->potential problem  Will Score: 17  Bed Support Surface: Atmos Air mattress  Will Intervention(s) Implemented: draw sheets, encouraged fluids, incontinence care    Education    Patient has a [place] to Observation order: No  Observation education completed and documented: N/A      Josephine Schulz RN    "

## 2020-03-10 NOTE — PLAN OF CARE
Discharge Planner PT   Patient plan for discharge: Return to nursing home    Current status: EVal completed- Pt transferred bed> wheelchair via stand pivot reaching for support; able to stand w/ vertical bar and SBA approx 30 seconds; then transferred wc> chair w/ stand pivot. SBA.    Anticipate pt would transfer easier within her own environment; has a floor to ceiling bar that she uses w/ transfers from her chair to the wheelchair     Barriers to return to prior living situation: medical stability     Recommendations for discharge: return to nursing home; HC services/ PT     Rationale for recommendations: Pt able to transfer safely  w/ stand pivot w/ SBA        Entered by: Honey Espinoza 03/10/2020 12:39 PM

## 2020-03-10 NOTE — PROGRESS NOTES
Oxygen saturation on room air 95%, persistent cough and reports general weakness.  Denies nausea, tolerating diet, transfers with 2 assist or lift to chair, will continue to monitor.

## 2020-03-10 NOTE — PLAN OF CARE
Patient A & O. Pivot transfer with assist of two to bed. Wheel chair dependent at baseline. Checked O2 on RA, patient desated to 88%. Now sating 93% on 1 L O2. Patient incontinent of urine, able to report when this happens. Changed and repositioned per patient request and report of wetness.

## 2020-03-10 NOTE — PROGRESS NOTES
03/10/20 1100   Quick Adds   Type of Visit Initial PT Evaluation   Living Environment   Lives With facility resident   Living Arrangements assisted living   Home Accessibility no concerns   Functional Level Prior   Ambulation 1-->assistive equipment   Transferring 1-->assistive equipment   Toileting 1-->assistive equipment   Bathing 3-->assistive equipment and person   Communication 0-->understands/communicates without difficulty   Swallowing 0-->swallows foods/liquids without difficulty   Cognition 0 - no cognition issues reported   Fall history within last six months no   Which of the above functional risks had a recent onset or change? ambulation;transferring;toileting   Prior Functional Level Comment PLOF_ - Pt indep. with  transfers- uses a floor to ceiling bar w/ transfers from her chair; utilizes  a manual wc apartment within her apartment, an elelctric wc for longer distances outside of her apartment    General Information   Onset of Illness/Injury or Date of Surgery - Date 03/09/20   Referring Physician Rakel   Patient/Family Goals Statement Return  to halfway    Pertinent History of Current Problem (include personal factors and/or comorbidities that impact the POC) 87 year old female with significant past medical history including COPD, cerebral aneurysm, hypertension, CKD stage III, CHF, hyperlipidemia and coronary artery disease who presents to the Emergency Department with concern for nausea, vomiting and diarrhea. generalized weakness- At baseline transfers in and out of chair/bed/wheelchair independently but then uses wheelchair for mobility. However, wants to work on strength and hoping to get physical therapy set up at her assisted living as an outpatient which I agree would be appropriate.  Strength slowly improving.     Precautions/Limitations fall precautions   General Observations Pt alert- able to discuss  PLOF.     General Info Comments Mildly tremulous in LEs   Range of Motion (ROM)   ROM  "Comment WFL    Strength   Strength Comments AG strength    Bed Mobility   Bed Mobility Comments SBA w/ useof bed rail   Transfer Skills   Transfer Comments Pt transferred bed> wheelchair via stand pivot reaching for support; able to stand w/ vertical bar and SBA approx 30 seconds; then transferred wc> chair w/ stand pivot. SBA.   Anticipate pt would transfer easier within her own environment; has a floor to ceiling bar that she uses w/ transfers from her chair to the wheelchair    Gait   Gait Comments Pt reports she was been on ambulatory due to falls    Balance   Balance Comments FAir/ good dynamic balance. WFL for transfers   General Therapy Interventions   Planned Therapy Interventions bed mobility training;transfer training   Clinical Impression   Criteria for Skilled Therapeutic Intervention yes, treatment indicated   PT Diagnosis Generalized weakness   Influenced by the following impairments Decreased strength   Functional limitations due to impairments Change in transfers   Clinical Presentation Stable/Uncomplicated   Clinical Presentation Rationale clinical judgement   Clinical Decision Making (Complexity) Low complexity   Therapy Frequency Daily   Predicted Duration of Therapy Intervention (days/wks) 2 days   Anticipated Discharge Disposition Home with Home Therapy/ home care PT  Pt seen by documenting therapist 12/19 and was ambulating distance w/ RW and CGA- pt  requesting Home care PT as pt would like to return to ambulating short distances- states she currently is not allowed to ambulate within her apartment because of decreased balance, falls.  ; but would be approp to be set up on an ambulation program   Risk & Benefits of therapy have been explained Yes   Patient, Family & other staff in agreement with plan of care Yes   [facility] AM-PAC  \"6 Clicks\" V.2 Basic Mobility Inpatient Short Form   1. Turning from your back to your side while in a flat bed without using bedrails? 3 - A Little   2. " Moving from lying on your back to sitting on the side of a flat bed without using bedrails? 3 - A Little   3. Moving to and from a bed to a chair (including a wheelchair)? 3 - A Little   4. Standing up from a chair using your arms (e.g., wheelchair, or bedside chair)? 3 - A Little   5. To walk in hospital room? 1 - Total   6. Climbing 3-5 steps with a railing? 1 - Total   Basic Mobility Raw Score (Score out of 24.Lower scores equate to lower levels of function) 14   Total Evaluation Time   Total Evaluation Time (Minutes) 10

## 2020-03-10 NOTE — PROGRESS NOTES
[facility]           Assessment & Plan      Jazmin Clifton is a 87 year old female admitted on 3/9/2020. She a complex medical history of COPD/?restrictive lung disease, congestive heart failure, ?coronary artery disease, chronic pain, cerebrovascular disease, history of brain aneurysm s/p clipping, CKD, anemia, and ?DVT. She presents with coughing and emesis.       Hypoxemia, cough - suspect due to COPD exacerbation  3/9/20 -- Noted oxygenation of 86% in the ED. Started on 1-2 LPM with improvement to the upper 90s. Reported cough, though as above difficult to tell if this is related to respiratory infection vs emesis based on patient description. Lungs diminished, no significant wheeze. CXR shows possible left lower lobe infiltrate. Received ceftriaxone and azithromycin in ED to cover for possible community acquired pneumonia. Lower suspicion currently for pneumonia without clear fever or leukocytosis. COPD exacerbation possible though no apparent wheeze. Low suspicion for CHF exacerbation, appear euvolemic or somewhat dehydrated. Will monitor patient overnight with scheduled nebs and increased steroid dose initially.  - AM CBC  - defer further antibiotics until reassessment in AM  - steroids: prednisone 40 mg daily  - nebulizer: duonebs Q4 hours while awake  - incentive spirometry  - continue home COPD medications   - titrate oxygen to oxygenation goal of > 89%   3/10/2020 -- improving, continue prednisone, switch to scheduled xopenex nebs as patient is somewhat shaky this AM.  Weaning off oxygen but still significant cough and poor air movement.  Clearly improving however.  Continue just azithromycin for now for antibiotics.  Day 2/5.       Mildly elevated creatinine on CKD  3/9/20 -- Creatinine 1.18, GFR 41. Recent baselines: creatinine 0.9-1.07, GFR ~48-58. Slightly worse than recent baseline, likely related to poor intake with nausea/emesis.   3/10/2020 -- improved    New atrial  "fibrillation   3/9/20 -- Occasional irregularity noted on cardiac exam. ECG reviewed, shows possible atrial fib/flutter though some p waves appear present, may have some artifact. Plan to watch on telemetry to further evaluate. No prior history of arrhythmia.  3/10/2020 -- ECG from yesterday and multiple episodes on tele convincing for paroxysmal atrial fibrillation. Rate control remains good.  CHADSVASC2 score is 8, meaning approximately 10% stroke risk - discussed with patient and elected to proceed with coumadin with hopeful plan to do checks at her assisted living likely through home care.       COPD, restrictive lung disease with likely exacerbation as above  3/9/20 -- Lungs diminished bilaterally in the bases. No PFTs on file, both restrictive and obstructive lung disease noted in history.  - continue home Breo Ellipta, Xopenex nebs prn  3/10/2020 -- continue home medications along with scheduled nebs and prednisone as above.  Patient a bit shaky today likely due to albuterol nebs so stopping scheduled duonebs and scheduling xopenex every 4 hours while awake.      Emesis, diarrhea  3/9/20 -- 6 days of coughing episodes with emesis and few days of diarrhea. Vague historian states she is coughing and \"throwing up phlegm\" but also nauseous, poor intake, unable to keep much down. 3 loose stools per day. Abdominal discomfort associated with emesis. CT abdomen/pelvis without acute findings. CXR shows possible left lower lobe infiltrate. Labs show signs of mild dehydration with increased Na, Cl and mildly elevated creatinine. Suspect gastroenteritis given symptoms. Respiratory etiology also considered, aside from cough, no clear shortness of breath.  - nausea protocol  - encourage oral intake, plan to order low maintenance dose of IVF if not tolerating PO (eating dinner and drinking on admit)  - monitor symptoms, if significant stool output will obtain stool studies  3/10/2020 -- suspect emesis was due to coughing - " none since admission.  Single soft stool, follow but no indication for stool studies at present.         Generalized weakness  3/10/2020 -- At baseline transfers in and out of chair/bed/wheelchair independently but then uses wheelchair for mobility. However, wants to work on strength and hoping to get physical therapy set up at her assisted living as an outpatient which I agree would be appropriate.  Strength slowly improving.      Chronic Diastolic Congestive Heart Failure   3/9/20 -- Appears euvolemic on exam on exam. Possible mild pulmonary vasculature on CXR, though poor inspiration. Current weight of 187 lb, which is stable. Last echocardiogram on 2/2019 demonstrated an EF of 55-60% and normal LV, normal RV, mild left atrial dilation, mild atrial root dilation and moderate diastolic dysfunction.  - Continue home furosemide daily   - Follow daily weights  - Judicious use of IVF  3/10/2020 -- appears stable.       Coronary Artery Disease  3/9/20 -- Noted in history though unclear diagnosis, no prior cardiac testing on file to verify or notes from cardiology.  - continue home ASA, statin  3/10/2020 -- with unclear diagnosis will plan to stop aspirin after tomorrow as below with starting coumadin - confirm with primary care provider to confirm if there is a need to continue the aspirin along with the coumadin but for now planning to stop it.       Chronic pain, right hip, low back, left shoulder  History of pelvic fracture  3/9/20 -- Follows with pain medicine, managed on acetaminophen, Cymbalta, gabapentin, oxycodone, and tizanidine. Planning on weaning off gabapentin per last skilled nursing note. Using low dose of prednisone for shoulder pain.  - continue home Oxycotin, dilaudid prn, prednisone 5 mg daily, gabapentin, duloxetine  3/10/2020 -- at baseline.      Hypertension  3/10/2020 --  Blood pressures mildly up  - continue home lisinopril, furosemide, nifedipine     Hyperlipidemia  - continue home atorvastatin     History  of CVA/TIA  S/P Carotid Endarterectomy 2017  Brain aneurysm s/p clipping  3/9/20 -- History of CVA 2017 s/p endarterectomy in 2017 Previous clipping of aneurysm.  - continue home statin, aspirin  3/10/2020 -- replacing aspirin with coumadin.  Likely stop aspirin after dose tomorrow      Gastroesophageal reflux disease  - continue home omeprazole     Chronic Normocytic Anemia  3/9/20 -- Hgb 9.4. Baseline recently ~9-10. No longer on home iron.  3/10/2020 -- hemoglobin 8.8, suspect mild dilution, no black or bloody stools or other evidence of acute loss.  Follow.      History of DVT  Noted in history though cannot find clear documentation of this. Do see notes indicating DVT prophylaxis following surgery as well as history of superficial thrombophlebitis.       Diet: Regular adult diet     DVT Prophylaxis: starting on coumadin, lovenox for now until therapeutic.  Follow GFR    [place] Catheter: none     Code Status: DNR/DNI            Disposition  Improving but still weak and shaky feeling even with transfers as she does at baseline, will need at least 1 more day inpatient, hopeful discharge back to assisted living tomorrow if continuing to improve.              Interval History:   Improving but slowly.  Weaning off oxygen this AM and breathing feels better, but still bad cough with deep breaths and still feels very generally weak. Overall feels clearly improved.  No emesis since admission.  1 soft stool overnight, not black or bloody.  No fever or chills.  No dyspnea at rest, but feels very weak and unable to transfer on her own safely which she normally does.  At baseline transfers in and out of chair/bed/wheelchair independently but then uses wheelchair for mobility. However, wants to work on strength and hoping to get physical therapy set up at her assisted living as an outpatient.    No other pain             Review of Systems:    ROS: 10 point ROS neg other than the symptoms noted above in the HPI.            "Medications:   Current active medications and PTA medications reviewed, see medication list for details.            Physical Exam:   Vitals were reviewed  Patient Vitals for the past 24 hrs:   BP Temp Temp src Pulse Heart Rate Resp SpO2 Height Weight   03/10/20 0824 -- -- -- -- -- -- 95 % -- --   03/10/20 0800 -- -- -- 96 -- -- -- -- --   03/10/20 0756 (!) 152/73 98.1  F (36.7  C) Oral 89 -- 18 97 % -- --   03/10/20 0559 -- -- -- -- -- -- -- -- 84.1 kg (185 lb 6.5 oz)   03/10/20 0535 -- -- -- -- 73 -- -- -- --   20 2324 136/59 98.1  F (36.7  C) Oral 103 76 18 93 % -- --   20 2302 -- -- -- -- 75 -- -- -- --   20 1930 -- -- -- -- -- -- 93 % -- --   20 1927 -- -- -- -- 84 -- -- -- --   20 1900 -- -- -- -- -- -- (!) 88 % -- --   20 1743 (!) 148/64 97.5  F (36.4  C) Oral 85 -- 18 98 % 1.6 m (5' 3\") 87.7 kg (193 lb 5.5 oz)   20 1630 (!) 150/81 -- -- 78 -- -- 95 % -- --   20 1530 (!) 151/86 -- -- 71 -- -- 97 % -- --   20 1430 128/65 -- -- 80 -- -- 94 % -- --   20 1354 -- -- -- -- -- -- 97 % -- --   20 1350 -- -- -- -- -- -- (!) 86 % -- --   20 1345 132/58 -- -- -- -- -- 93 % -- --   20 1315 138/67 -- -- 76 -- -- 94 % -- --   20 1205 (!) 146/73 97.9  F (36.6  C) Temporal 85 -- 18 94 % 1.6 m (5' 3\") 84.8 kg (187 lb)       Temperatures:  Current - Temp: 98.1  F (36.7  C); Max - Temp  Av.9  F (36.6  C)  Min: 97.5  F (36.4  C)  Max: 98.1  F (36.7  C)  Respiration range: Resp  Av  Min: 18  Max: 18  Pulse range: Pulse  Av.8  Min: 71  Max: 103  Blood pressure range: Systolic (24hrs), Av , Min:128 , Max:152   ; Diastolic (24hrs), Av, Min:58, Max:86    Pulse oximetry range: SpO2  Av.9 %  Min: 86 %  Max: 98 %  I/O last 3 completed shifts:  In: 1000 [IV Piggyback:1000]  Out: -     Intake/Output Summary (Last 24 hours) at 3/10/2020 0950  Last data filed at 3/9/2020 1357  Gross per 24 hour   Intake 1000 ml   Output --   Net " 1000 ml     EXAM:  General: awake and alert, NAD, oriented x 3  Head: normocephalic  Neck: unremarkable, no lymphadenopathy   HEENT: oropharynx pink and moist    Heart: Regular rate and rhythm, no murmurs, rubs, or gallops  Lungs: decreased air movement throughout with wheezing with forced exhalation, no distress, no crackles.    Abdomen: soft, non-tender, no masses or organomegaly  Extremities: no edema in lower extremities   Skin unremarkable.               Data:     Results for orders placed or performed during the hospital encounter of 03/09/20 (from the past 24 hour(s))   CBC with platelets differential   Result Value Ref Range    WBC 8.1 4.0 - 11.0 10e9/L    RBC Count 3.59 (L) 3.8 - 5.2 10e12/L    Hemoglobin 9.4 (L) 11.7 - 15.7 g/dL    Hematocrit 31.9 (L) 35.0 - 47.0 %    MCV 89 78 - 100 fl    MCH 26.2 (L) 26.5 - 33.0 pg    MCHC 29.5 (L) 31.5 - 36.5 g/dL    RDW 14.4 10.0 - 15.0 %    Platelet Count 261 150 - 450 10e9/L    Diff Method Automated Method     % Neutrophils 87.1 %    % Lymphocytes 8.3 %    % Monocytes 3.3 %    % Eosinophils 0.5 %    % Basophils 0.4 %    % Immature Granulocytes 0.4 %    Nucleated RBCs 0 0 /100    Absolute Neutrophil 7.0 1.6 - 8.3 10e9/L    Absolute Lymphocytes 0.7 (L) 0.8 - 5.3 10e9/L    Absolute Monocytes 0.3 0.0 - 1.3 10e9/L    Absolute Eosinophils 0.0 0.0 - 0.7 10e9/L    Absolute Basophils 0.0 0.0 - 0.2 10e9/L    Abs Immature Granulocytes 0.0 0 - 0.4 10e9/L    Absolute Nucleated RBC 0.0    Comprehensive metabolic panel   Result Value Ref Range    Sodium 145 (H) 133 - 144 mmol/L    Potassium 3.9 3.4 - 5.3 mmol/L    Chloride 112 (H) 94 - 109 mmol/L    Carbon Dioxide 27 20 - 32 mmol/L    Anion Gap 6 3 - 14 mmol/L    Glucose 96 70 - 99 mg/dL    Urea Nitrogen 31 (H) 7 - 30 mg/dL    Creatinine 1.18 (H) 0.52 - 1.04 mg/dL    GFR Estimate 41 (L) >60 mL/min/[1.73_m2]    GFR Estimate If Black 48 (L) >60 mL/min/[1.73_m2]    Calcium 9.8 8.5 - 10.1 mg/dL    Bilirubin Total 0.4 0.2 - 1.3 mg/dL     Albumin 3.2 (L) 3.4 - 5.0 g/dL    Protein Total 6.2 (L) 6.8 - 8.8 g/dL    Alkaline Phosphatase 119 40 - 150 U/L    ALT 14 0 - 50 U/L    AST 12 0 - 45 U/L   Lipase   Result Value Ref Range    Lipase 52 (L) 73 - 393 U/L   Troponin I   Result Value Ref Range    Troponin I ES 0.033 0.000 - 0.045 ug/L   Lactic acid whole blood   Result Value Ref Range    Lactic Acid 1.3 0.7 - 2.0 mmol/L   Nt probnp inpatient   Result Value Ref Range    N-Terminal Pro BNP Inpatient 568 0 - 1,800 pg/mL   CT Abdomen Pelvis w Contrast    Narrative    CT ABDOMEN/PELVIS WITH CONTRAST March 9, 2020 1:35 PM    CLINICAL HISTORY: Diarrhea and vomiting, abdominal tenderness.    TECHNIQUE: CT scan of the abdomen and pelvis was performed following  injection of IV contrast. Multiplanar reformats were obtained. Dose  reduction techniques were used.    CONTRAST: Isovue 370, 91 mL    COMPARISON: CT abdomen and pelvis 9/20/2019.    FINDINGS:   LOWER CHEST: Normal.    HEPATOBILIARY: Cholecystectomy. No acute abnormality of the liver.    PANCREAS: Normal.    SPLEEN: Calcifications.    ADRENAL GLANDS: Normal.    KIDNEYS/BLADDER: There is an indeterminate exophytic anterior left  renal lesion that is stable measuring 1 cm series 5 image 74. This  appeared hyperdense on the prior exam. There are other cysts that are  unchanged that do not require specific imaging follow-up. There is  another complex lesion that is stable at the lower right kidney  measuring 1.1 cm series 5 image 96. No hydronephrosis.    BOWEL: No evidence for bowel obstruction or inflammation. Colonic  diverticula. No signs of appendicitis. Appendix not clearly seen.  Fat-containing stable ventral midline abdominal wall hernia measuring  5 cm series 2 image 55. No abscess or free air.    PELVIC ORGANS: Normal.    ADDITIONAL FINDINGS: Diffuse vascular calcifications including the  aorta, its branches, and the coronary arteries. There is a stable cyst  at the right inguinal location that is  lobulated inferiorly. The  largest portion measures 8.6 x 6.1 cm series 5 image 210. Subacute  fractures of the left inferior pubic ramus, and right proximal sacrum  showing callus formation.    MUSCULOSKELETAL: Diffuse degenerative changes of the spine. Left hip  arthroplasty. Lumbosacral fusion changes.      Impression    IMPRESSION:   1.  No specific acute abnormality is seen.  2.  There are subacute fractures of the left inferior pubic ramus and  right sacrum with a degree of healing.  3.  Stable ventral midline fat-containing abdominal wall hernia.  4.  Stable cyst at the right inguinal location may be a lymphoma or  seroma.  5.  There are indeterminate but stable bilateral renal lesions.  Recommend correlation with nonurgent MRI.  6.  Diffuse vascular calcifications.    VEENA BARCENAS MD   UA reflex to Microscopic   Result Value Ref Range    Color Urine Yellow     Appearance Urine Clear     Glucose Urine Negative NEG^Negative mg/dL    Bilirubin Urine Negative NEG^Negative    Ketones Urine Negative NEG^Negative mg/dL    Specific Gravity Urine 1.021 1.003 - 1.035    Blood Urine Negative NEG^Negative    pH Urine 5.0 5.0 - 7.0 pH    Protein Albumin Urine Negative NEG^Negative mg/dL    Urobilinogen mg/dL 0.0 0.0 - 2.0 mg/dL    Nitrite Urine Positive (A) NEG^Negative    Leukocyte Esterase Urine Negative NEG^Negative    Source Catheterized Urine     RBC Urine <1 0 - 2 /HPF    WBC Urine 3 0 - 5 /HPF    Bacteria Urine Few (A) NEG^Negative /HPF    Squamous Epithelial /HPF Urine 1 0 - 1 /HPF    Mucous Urine Present (A) NEG^Negative /LPF    Hyaline Casts 20 (H) 0 - 2 /LPF   Influenza A/B antigen   Result Value Ref Range    Influenza A/B Agn Specimen Nasopharyngeal     Influenza A Negative NEG^Negative    Influenza B Negative NEG^Negative   Chest XR,  PA & LAT    Narrative    CHEST TWO VIEWS 3/9/2020 3:02 PM    HISTORY: Cough.    COMPARISON: 12/26/2019.    FINDINGS: Cardiac enlargement. Pulmonary vascularity is within  normal  limits. Question some mild hazy increased opacity left lower lung with  infiltrate not excluded. Clinical correlation. Mild linear scarring or  atelectasis right lower lung. Benign calcified granuloma left upper  lobe laterally. Elevation right hemidiaphragm. No significant bony  abnormalities. Chest is otherwise negative.      Impression    IMPRESSION: Question some mild infiltrate left lower lung. Clinical  correlation.    BRITTNEE DAVIS MD   CBC with platelets   Result Value Ref Range    WBC 4.9 4.0 - 11.0 10e9/L    RBC Count 3.41 (L) 3.8 - 5.2 10e12/L    Hemoglobin 8.8 (L) 11.7 - 15.7 g/dL    Hematocrit 30.2 (L) 35.0 - 47.0 %    MCV 89 78 - 100 fl    MCH 25.8 (L) 26.5 - 33.0 pg    MCHC 29.1 (L) 31.5 - 36.5 g/dL    RDW 14.4 10.0 - 15.0 %    Platelet Count 238 150 - 450 10e9/L   Comprehensive metabolic panel   Result Value Ref Range    Sodium 144 133 - 144 mmol/L    Potassium 4.1 3.4 - 5.3 mmol/L    Chloride 113 (H) 94 - 109 mmol/L    Carbon Dioxide 27 20 - 32 mmol/L    Anion Gap 4 3 - 14 mmol/L    Glucose 122 (H) 70 - 99 mg/dL    Urea Nitrogen 25 7 - 30 mg/dL    Creatinine 1.05 (H) 0.52 - 1.04 mg/dL    GFR Estimate 48 (L) >60 mL/min/[1.73_m2]    GFR Estimate If Black 55 (L) >60 mL/min/[1.73_m2]    Calcium 9.6 8.5 - 10.1 mg/dL    Bilirubin Total 0.3 0.2 - 1.3 mg/dL    Albumin 3.0 (L) 3.4 - 5.0 g/dL    Protein Total 6.1 (L) 6.8 - 8.8 g/dL    Alkaline Phosphatase 108 40 - 150 U/L    ALT 12 0 - 50 U/L    AST 10 0 - 45 U/L           Attestation:  I have reviewed today's vital signs, notes, medications, labs and imaging.  Amount of time spent in direct patient care: 30 minutes.     Kenton Blackwood MD, MD

## 2020-03-10 NOTE — PLAN OF CARE
"Pt alert, oriented, w/c bound at home. On 1 L O2 via nc. Takes pills whole in applesauce. Denies pain, nausea, SOB. Has infrequent cough. Incontinent at times. LS clear, diminished. Scheduled neb treatments. No bowel movements overnight. /59 (BP Location: Left arm)   Pulse 103   Temp 98.1  F (36.7  C) (Oral)   Resp 18   Ht 1.6 m (5' 3\")   Wt 87.7 kg (193 lb 5.5 oz)   SpO2 93%   BMI 34.25 kg/m      "

## 2020-03-10 NOTE — PHARMACY-ANTICOAGULATION SERVICE
Clinical Pharmacy - Warfarin Dosing Consult     Pharmacy has been consulted to manage this patient s warfarin therapy.  Indication: Atrial Fibrillation  Therapy Goal: INR 2-3  Provider/Team: Chip HERNANDEZ at Assisted Living  OP Anticoag Clinic: Christine Ramos?  Warfarin Prior to Admission: No  Recent documented change in oral intake/nutrition: No  Dose Comments: (Took 4mg/day back in 2007)    INR   Date Value Ref Range Status   11/15/2019 1.09 0.86 - 1.14 Final   05/10/2019 1.02 0.86 - 1.14 Final       Pending return of today's INR, will probably start at  warfarin 4 mg today.  Pharmacy will monitor Jazmin Clifton daily and order warfarin doses to achieve specified goal.      Please contact pharmacy as soon as possible if the warfarin needs to be held for a procedure or if the warfarin goals change.

## 2020-03-11 PROBLEM — I48.0 PAROXYSMAL ATRIAL FIBRILLATION (H): Status: ACTIVE | Noted: 2020-01-01

## 2020-03-11 PROBLEM — I48.91 ATRIAL FIBRILLATION, UNSPECIFIED TYPE (H): Status: ACTIVE | Noted: 2020-01-01

## 2020-03-11 NOTE — TELEPHONE ENCOUNTER
We received an INR referral upon this patient discharging from the hospital today.    You are listed as her PCP in her chart.    We do not typically monitor patients in long term care. Are you okay with being the physician in charge of anticoagulation management (who we go to for compliance concerns, bridging instructions, etc)  under [facility] or is the assisted living facility going to handle this with their own team?    If you would like us to monitor her, we will need a signed INR referral from you.    Thank you,  Deysi Peralta, RN, BSN, PHN  [facility]

## 2020-03-11 NOTE — PLAN OF CARE
Patient is alert, up to chair with 1 assist and walker today. Incontinent some urine and voided once on toilet. Eats independently after tray set up. No coughing today, saturation 93% on room air. Lungs diminished at bases. Instructed on incentive spirometry, pulls to 750. Will go home to [facility] at 1500 when son comes to transport. Left message at [facility] to give report-no call back yet. On-coming nurse aware. Patient needs to  meds at retail pharmacy at discharge. Alarms on for safety, no attempts at self transfers.

## 2020-03-11 NOTE — PROGRESS NOTES
ANTICOAGULATION  MANAGEMENT: Discharge Review    Jazmin Clifton chart reviewed for anticoagulation continuity of care    Hospital Admission on 3-9-2020 for gastroenteritis and hypoxemia, cough-suspect due to COPD exacerbation without true respiratory failure.    Discharge disposition:  Assisted Living w/home care through [facility].    Results:    Recent Labs   Lab Test 03/11/20  0450 03/10/20  1158 11/15/19  1054   INR 1.20* 1.13 1.09       Anticoagulation inpatient management:     only 1 dose of warfarin, started on 3-10. Patient is new to warfarin    Anticoagulation discharge instructions:     Warfarin dosing: increase dose to 3 mg daily   Bridging: No   INR goal change: No      Medication changes affecting anticoagulation: Yes: patient on prednisone and azithromycin for 3 days after discharge. PTA ASA stopped.    Additional factors affecting anticoagulation: Yes: Patient had possible gastroenteritis prior to hospital admission. She had loose stools daily up until 3-10, then stopped.     Plan         Patient not contacted    Anticoagulation Calendar updated    Deysi Peralta RN

## 2020-03-11 NOTE — PROGRESS NOTES
[facility] DISCHARGE NOTE    Patient discharged to long term care facility at 3:23 PM via wheel chair. Accompanied by son and staff. Discharge instructions reviewed with patient and son, opportunity offered to ask questions. Prescriptions filled and sent with patient upon discharge. All belongings sent with patient.    Brigid Albert RN

## 2020-03-11 NOTE — PROGRESS NOTES
Pt is A/Ox3. On 1.5 lpm nc 93%, desats 86-88% on RA. LS diminished, infrequent cough. Tele NSR w/PAC, on warfarin for new A-fib. Pt received melatonin and was sleeping between cares, declining pain medication and having to use bathroom. Nursing assistant working with client informed this author that pt was the same way the night before and gets up at 0630. NA said pt had sat on commode for awhile and was only able to produce 50 ml of urine. Pt was bladder scanned for 234 ml PRV and this information was given to oncoming RN. Pt was told they should try to urinate again in the next hour. Pt has intermittent incontinence, has been continent this shift, no loose stools. Pt takes medications whole in applesauce.

## 2020-03-11 NOTE — PHARMACY-ANTICOAGULATION SERVICE
Clinical Pharmacy- Warfarin Discharge Note  This patient is currently on warfarin for the treatment of Atrial fibrillation.  INR Goal= 2-3  Expected length of therapy lifetime       Anticoagulation Dose History     Recent Dosing and Labs Latest Ref Rng & Units 10/3/2007 7/26/2017 2/19/2019 5/10/2019 11/15/2019 3/10/2020 3/11/2020    Warfarin 2.5 mg - - - - - - 2.5 mg -    INR 0.86 - 1.14 1.02 1.07 1.09 1.02 1.09 1.13 1.20(H)            Recommend discharging the patient on a warfarin regimen of 3 mg with a prescription for warfarin 1mg tablets.      Take 3 mg ( 3 X 1 mg tablet) coumadin daily, starting tonight Wednesday, March 11th. Have an INR drawn early Friday, March 13 th and call results to the [facility] @ [phone]. The Clinic will determine future dosing and set up further INR draws.

## 2020-03-11 NOTE — PLAN OF CARE
Patient A & O. Up with walker and assist of one to the bedside commode. Up in chair for dinner. O2 sats dropped to 88 on RA, put back on O2 1.5 L, sating 93%. Incontinent of urine.

## 2020-03-11 NOTE — DISCHARGE SUMMARY
[facility] Discharge Summary    Jazmin Clifton MRN# [id number]   Age: 87 year old YOB: 1932     Date of Admission:  3/9/2020  Date of Discharge::  3/11/2020  Admitting Physician:  Kenton Blackwood MD  Discharge Physician:  Kenton Blackwood MD, MD             Admission Diagnoses:   Gastroenteritis [K52.9]          Principle Discharge Diagnosis:          Hypoxemia, cough - suspect due to COPD exacerbation without true respiratory failure    See hospital course for further active diagnoses addressed during this admission.            Procedures:   No procedures performed during this admission          Medications Prior to Admission:     Medications Prior to Admission   Medication Sig Dispense Refill Last Dose     ACETAMINOPHEN EXTRA STRENGTH 500 MG tablet TAKE TWO TABLETS (1000MG) BY MOUTH THREE TIMES DAILY (Patient taking differently: Take 1,000 mg by mouth 3 times daily ) 186 tablet PRN 3/9/2020 at 0929     ASPIRIN LOW DOSE 81 MG chewable tablet CHEW AND SWALLOW ONE TABLET BY MOUTH ONCE DAILY (Patient taking differently: Take 81 mg by mouth daily ) 30 tablet 0 3/9/2020 at 0929     atorvastatin (LIPITOR) 40 MG tablet TAKE 1 TABLET BY MOUTH ONCE DAILY (Patient taking differently: Take 40 mg by mouth daily ) 30 tablet 0 3/8/2020 at 1952     BREO ELLIPTA 100-25 MCG/INH inhaler INHALE 1 PUFF BY MOUTH ONCE DAILY (Patient taking differently: Inhale 1 puff into the lungs daily ) 1 Inhaler 97 3/9/2020 at 0929     calcium carbonate (TUMS) 500 MG chewable tablet Take 1 chew tab by mouth 3 times daily as needed   3/5/2020 at 0544     DOK PLUS 50-8.6 MG tablet TAKE 1 TABLET BY MOUTH TWICE DAILY (Patient taking differently: Take 1 tablet by mouth 2 times daily ) 60 tablet 98 3/9/2020 at 0929     DULoxetine (CYMBALTA) 30 MG capsule TAKE 1 CAPSULE BY MOUTH AT BEDTIME (Patient taking differently: Take 30 mg by mouth daily ) 30 capsule 11 3/8/2020 at 1952     DULoxetine (CYMBALTA) 60 MG capsule TAKE 1 CAPSULE BY  MOUTH EVERY MORNING (Patient taking differently: Take 60 mg by mouth daily ) 31 capsule PRN 3/9/2020 at 0929     Emollient (MOISTURIZING LOTION EX) Externally apply topically 2 times daily Bilateral lower extremeties   3/8/2020 at 1952     furosemide (LASIX) 20 MG tablet TAKE 1 TABLET BY MOUTH ONCE DAILY (Patient taking differently: Take 20 mg by mouth daily ) 30 tablet 98 3/9/2020 at 0929     gabapentin (NEURONTIN) 100 MG capsule Take 2 capsules (200 mg) by mouth daily for 7 days, THEN 1 capsule (100 mg) daily for 7 days. 21 capsule 0 3/9/2020 at 0929     HYDROmorphone (DILAUDID) 2 MG tablet Take 2 mg by mouth 2 times daily as needed for severe pain   3/6/2020 at 1931     hypromellose-dextran (GENTEAL TEARS) 0.1-0.3 % ophthalmic solution Place 1 drop into both eyes every 6 hours as needed for dry eyes   2/27/2020 at 0805     levalbuterol (XOPENEX) 1.25 MG/3ML neb solution Take 1 ampule by nebulization every 4 hours as needed for shortness of breath / dyspnea or wheezing And every 4 hours PRN   12/26/2019     Lidocaine (LIDOCARE) 4 % Patch Place 1 patch onto the skin daily To desired area (shoulder or hip). On for 12hrs, off for 12hrs 30 patch 11 3/6/2020     lisinopril (PRINIVIL/ZESTRIL) 20 MG tablet Take 1 tablet (20 mg) by mouth daily 30 tablet 11 3/9/2020 at 0929     NIFEdipine ER (ADALAT CC) 60 MG 24 hr tablet Take 60 mg by mouth daily    3/9/2020 at 0929     nystatin (MYCOSTATIN) 434276 UNIT/GM external powder Apply topically 3 times daily 1 Bottle 97 3/8/2020 at 1952     omeprazole (PRILOSEC) 40 MG DR capsule TAKE 1 CAPSULE BY MOUTH ONCE DAILY (Patient taking differently: Take 40 mg by mouth daily ) 30 capsule 0 3/9/2020 at 0929     ondansetron (ZOFRAN) 4 MG tablet Take 4 mg by mouth every 6 hours as needed for nausea   3/9/2020 at 0527     OXYCONTIN 10 MG 12 hr tablet TAKE ONE TABLET BY MOUTH EVERY 12 HOURS (Patient taking differently: Take 10 mg by mouth every 12 hours DO NOT CRUSH.) 60 tablet 0 3/9/2020 at  0929     predniSONE (DELTASONE) 5 MG tablet TAKE 1 TABLET BY MOUTH ONCE DAILY (Patient taking differently: Take 5 mg by mouth daily ) 31 tablet PRN 3/9/2020 at 0929     VITAMIN D3 25 MCG (1000 UT) tablet TAKE 1 TABLET BY MOUTH ONCE DAILY (Patient taking differently: Take 25 mcg by mouth daily ) 30 tablet 97 3/9/2020 at 0929     benzocaine (ORAJEL/ANBESOL) 10 % gel Take by mouth 4 times daily as needed for moderate pain   Unknown at Unknown time     bisacodyl (DULCOLAX) 10 MG suppository Place 10 mg rectally daily as needed for constipation   Unknown at Unknown time     Blood Glucose Monitoring Suppl CORY 2 times daily Call nurse for further directions if blood glucose is less than 80 or greater than 250   Unknown at Unknown time     magnesium hydroxide (MILK OF MAGNESIA) 400 MG/5ML suspension Take 30 mLs by mouth daily as needed for constipation or heartburn   Unknown at Unknown time     Menthol, Topical Analgesic, (BIOFREEZE) 4 % GEL Externally apply topically 4 times daily as needed To left shoulder and right hip   Unknown at Unknown time     tiZANidine (ZANAFLEX) 2 MG tablet Take 2 mg by mouth every 8 hours as needed    More than a month at Unknown time             Discharge Medications:     Current Discharge Medication List      START taking these medications    Details   warfarin ANTICOAGULANT (COUMADIN) 1 MG tablet Take 3 tablets (3 mg) by mouth daily or as directed by the Anticoagulation Clinic  Qty: 90 tablet, Refills: 11    Associated Diagnoses: Paroxysmal atrial fibrillation (H); History of DVT (deep vein thrombosis)         CONTINUE these medications which have NOT CHANGED    Details   ACETAMINOPHEN EXTRA STRENGTH 500 MG tablet TAKE TWO TABLETS (1000MG) BY MOUTH THREE TIMES DAILY  Qty: 186 tablet, Refills: PRN    Associated Diagnoses: DDD (degenerative disc disease), lumbar; Chronic left shoulder pain      ASPIRIN LOW DOSE 81 MG chewable tablet CHEW AND SWALLOW ONE TABLET BY MOUTH ONCE DAILY  Qty: 30  tablet, Refills: 0    Associated Diagnoses: Coronary artery disease of native heart with stable angina pectoris, unspecified vessel or lesion type (H)      atorvastatin (LIPITOR) 40 MG tablet TAKE 1 TABLET BY MOUTH ONCE DAILY  Qty: 30 tablet, Refills: 0    Associated Diagnoses: Congestive heart failure, unspecified HF chronicity, unspecified heart failure type (H)      BREO ELLIPTA 100-25 MCG/INH inhaler INHALE 1 PUFF BY MOUTH ONCE DAILY  Qty: 1 Inhaler, Refills: 97    Associated Diagnoses: Chronic obstructive pulmonary disease, unspecified COPD type (H)      calcium carbonate (TUMS) 500 MG chewable tablet Take 1 chew tab by mouth 3 times daily as needed      DOK PLUS 50-8.6 MG tablet TAKE 1 TABLET BY MOUTH TWICE DAILY  Qty: 60 tablet, Refills: 98    Associated Diagnoses: Closed fracture of ramus of left pubis, initial encounter (H)      !! DULoxetine (CYMBALTA) 30 MG capsule TAKE 1 CAPSULE BY MOUTH AT BEDTIME  Qty: 30 capsule, Refills: 11    Associated Diagnoses: DDD (degenerative disc disease), lumbar; Anaclitic depression      !! DULoxetine (CYMBALTA) 60 MG capsule TAKE 1 CAPSULE BY MOUTH EVERY MORNING  Qty: 31 capsule, Refills: PRN    Associated Diagnoses: DDD (degenerative disc disease), lumbar; Anaclitic depression      Emollient (MOISTURIZING LOTION EX) Externally apply topically 2 times daily Bilateral lower extremeties      furosemide (LASIX) 20 MG tablet TAKE 1 TABLET BY MOUTH ONCE DAILY  Qty: 30 tablet, Refills: 98    Associated Diagnoses: Chronic diastolic congestive heart failure (H)      gabapentin (NEURONTIN) 100 MG capsule Take 2 capsules (200 mg) by mouth daily for 7 days, THEN 1 capsule (100 mg) daily for 7 days.  Qty: 21 capsule, Refills: 0    Associated Diagnoses: DDD (degenerative disc disease), lumbar; Chronic pain syndrome      HYDROmorphone (DILAUDID) 2 MG tablet Take 2 mg by mouth 2 times daily as needed for severe pain      hypromellose-dextran (GENTEAL TEARS) 0.1-0.3 % ophthalmic solution  Place 1 drop into both eyes every 6 hours as needed for dry eyes      levalbuterol (XOPENEX) 1.25 MG/3ML neb solution Take 1 ampule by nebulization every 4 hours as needed for shortness of breath / dyspnea or wheezing And every 4 hours PRN      Lidocaine (LIDOCARE) 4 % Patch Place 1 patch onto the skin daily To desired area (shoulder or hip). On for 12hrs, off for 12hrs  Qty: 30 patch, Refills: 11    Associated Diagnoses: Chronic left shoulder pain; DDD (degenerative disc disease), lumbar      lisinopril (PRINIVIL/ZESTRIL) 20 MG tablet Take 1 tablet (20 mg) by mouth daily  Qty: 30 tablet, Refills: 11    Associated Diagnoses: Essential hypertension      NIFEdipine ER (ADALAT CC) 60 MG 24 hr tablet Take 60 mg by mouth daily       nystatin (MYCOSTATIN) 196730 UNIT/GM external powder Apply topically 3 times daily  Qty: 1 Bottle, Refills: 97    Associated Diagnoses: Dermatitis associated with moisture      omeprazole (PRILOSEC) 40 MG DR capsule TAKE 1 CAPSULE BY MOUTH ONCE DAILY  Qty: 30 capsule, Refills: 0    Associated Diagnoses: Gastroesophageal reflux disease without esophagitis      ondansetron (ZOFRAN) 4 MG tablet Take 4 mg by mouth every 6 hours as needed for nausea      OXYCONTIN 10 MG 12 hr tablet TAKE ONE TABLET BY MOUTH EVERY 12 HOURS  Qty: 60 tablet, Refills: 0    Associated Diagnoses: DDD (degenerative disc disease), lumbar      predniSONE (DELTASONE) 5 MG tablet TAKE 1 TABLET BY MOUTH ONCE DAILY  Qty: 31 tablet, Refills: PRN    Associated Diagnoses: Chronic left shoulder pain; DDD (degenerative disc disease), lumbar      VITAMIN D3 25 MCG (1000 UT) tablet TAKE 1 TABLET BY MOUTH ONCE DAILY  Qty: 30 tablet, Refills: 97    Associated Diagnoses: Osteoporosis without current pathological fracture, unspecified osteoporosis type      benzocaine (ORAJEL/ANBESOL) 10 % gel Take by mouth 4 times daily as needed for moderate pain      bisacodyl (DULCOLAX) 10 MG suppository Place 10 mg rectally daily as needed for  "constipation      Blood Glucose Monitoring Suppl CORY 2 times daily Call nurse for further directions if blood glucose is less than 80 or greater than 250      magnesium hydroxide (MILK OF MAGNESIA) 400 MG/5ML suspension Take 30 mLs by mouth daily as needed for constipation or heartburn      Menthol, Topical Analgesic, (BIOFREEZE) 4 % GEL Externally apply topically 4 times daily as needed To left shoulder and right hip      tiZANidine (ZANAFLEX) 2 MG tablet Take 2 mg by mouth every 8 hours as needed        !! - Potential duplicate medications found. Please discuss with provider.                     Brief History of Illness:     From Admission H+P:   Jazmin Clifton is a 87 year old female who presents with emesis and cough.     She states about a week ago she woke up with a cough and then later that day started \"throwing up phlegm\". She has been nauseous, decreased her intake. She would try to eat something but it would cause her to start coughing and ultimately throw up. Initially she was able to keep liquids down but not any more. She reports she has not eaten much of anything. Surrounding the coughing/emesis episodes she would have pain in her lower abdomen and also at times feel feverish/chilled. She developed diarrhea over the past couple of days. She has been having about 3 per day, described as formed but loose stools.     Patient denies sore throat, shortness of breath, palpitations, chest pain, changes in urination, rash or wounds.               TODAY:     Subjective:  Patient feels much improved today.  On room air and breathing and cough doing well.  No fever or chills.  Much less shaky today.  Feels like strength is much better and feels like she's ready and safe for discharge back to her assisted living appointment today.  No new concerns.  No diarrhea since yesterday.  No black or bloody stools.  No urinary symptoms.  No other pain.       ROS:   ROS: 10 point ROS neg other than the symptoms noted above " "in the HPI.   BP (!) 140/60   Pulse 81   Temp 97.7  F (36.5  C) (Oral)   Resp 16   Ht 1.6 m (5' 3\")   Wt 84.2 kg (185 lb 10 oz)   SpO2 93%   BMI 32.88 kg/m     EXAM:  General: awake and alert, NAD, oriented x 3  Head: normocephalic  Neck: unremarkable, no lymphadenopathy   HEENT: oropharynx pink and moist    Heart: Regular rate and rhythm, no murmurs, rubs, or gallops  Lungs: much better air movement today, minimal wheezing, no distress, no crackles.    Abdomen: soft, non-tender, no masses or organomegaly  Extremities: no edema in lower extremities   Skin unremarkable.            Hospital Course:   Jazmin Clifton is a 87 year old female admitted on 3/9/2020. She a complex medical history of COPD/?restrictive lung disease, congestive heart failure, ?coronary artery disease, chronic pain, cerebrovascular disease, history of brain aneurysm s/p clipping, CKD, anemia, and ?DVT. She presents with coughing, hypoxia and emesis.        Hypoxemia, cough - suspect due to COPD exacerbation without true respiratory failure  3/9/20 -- Noted oxygenation of 86% in the ED. Started on 1-2 LPM with improvement to the upper 90s. Reported cough, though as above difficult to tell if this is related to respiratory infection vs emesis based on patient description. Lungs diminished, no significant wheeze. CXR shows possible left lower lobe infiltrate. Received ceftriaxone and azithromycin in ED to cover for possible community acquired pneumonia. Lower suspicion currently for pneumonia without clear fever or leukocytosis. COPD exacerbation possible though no apparent wheeze. Low suspicion for CHF exacerbation, appear euvolemic or somewhat dehydrated. Will monitor patient overnight with scheduled nebs and increased steroid dose initially.  - AM CBC  - defer further antibiotics until reassessment in AM  - steroids: prednisone 40 mg daily  - nebulizer: duonebs Q4 hours while awake  - incentive spirometry  - continue home COPD " medications   - titrate oxygen to oxygenation goal of > 89%   3/10/2020 -- improving, continue prednisone, switch to scheduled xopenex nebs as patient is somewhat shaky this AM.  Weaning off oxygen but still significant cough and poor air movement.  Clearly improving however.  Continue just azithromycin for now for antibiotics.  Day 2/5.    3/11/2020 -- doing well. Ok to complete 3 more days of prednisone and azithromycin.   Continue prior to admission medications.        Mildly elevated creatinine on CKD  3/9/20 -- Creatinine 1.18, GFR 41. Recent baselines: creatinine 0.9-1.07, GFR ~48-58. Slightly worse than recent baseline, likely related to poor intake with nausea/emesis.   3/11/2020 -- creatinine up slightly more to 1.3, intake improving.  I don't see any alarming cause for this and she appears to now be keeping up with her intake - ok for discharge but recommend recheck of BMP at follow-up with primary care provider within 1 week.       New atrial fibrillation   3/9/20 -- Occasional irregularity noted on cardiac exam. ECG reviewed, shows possible atrial fib/flutter though some p waves appear present, may have some artifact. Plan to watch on telemetry to further evaluate. No prior history of arrhythmia.  3/10/2020 -- ECG from yesterday and multiple episodes on tele convincing for paroxysmal atrial fibrillation. Rate control remains good.  CHADSVASC2 score is 8, meaning approximately 10% stroke risk - discussed with patient and elected to proceed with coumadin with hopeful plan to do checks at her assisted living likely through home care.    3/11/2020 -- doing well, pharmacy to coordinate coumadin checks/dosage on discharge.      COPD, restrictive lung disease with likely exacerbation as above  3/9/20 -- Lungs diminished bilaterally in the bases. No PFTs on file, both restrictive and obstructive lung disease noted in history.  - continue home Breo Ellipta, Xopenex nebs prn  3/10/2020 -- continue home medications  "along with scheduled nebs and prednisone as above.  Patient a bit shaky today likely due to albuterol nebs so stopping scheduled duonebs and scheduling xopenex every 4 hours while awake.    3/11/2020 -- doing well on room air with stable sats, treatment as above.        Emesis, diarrhea  3/9/20 -- 6 days of coughing episodes with emesis and few days of diarrhea. Vague historian states she is coughing and \"throwing up phlegm\" but also nauseous, poor intake, unable to keep much down. 3 loose stools per day. Abdominal discomfort associated with emesis. CT abdomen/pelvis without acute findings. CXR shows possible left lower lobe infiltrate. Labs show signs of mild dehydration with increased Na, Cl and mildly elevated creatinine. Suspect gastroenteritis given symptoms. Respiratory etiology also considered, aside from cough, no clear shortness of breath.  - nausea protocol  - encourage oral intake, plan to order low maintenance dose of IVF if not tolerating PO (eating dinner and drinking on admit)  - monitor symptoms, if significant stool output will obtain stool studies  3/10/2020 -- suspect emesis was due to coughing - none since admission.  Single soft stool, follow but no indication for stool studies at present.   3/11/2020 -- no diarrhea since yesterday, only a few total this admission.  No black or bloody stools.  Tolerating regular diet with now good intake day of discharge.  Appears that emesis was due to coughing with some mild diarrhea in the background apparently resolved on discharge.           Generalized weakness  3/10/2020 -- At baseline transfers in and out of chair/bed/wheelchair independently but then uses wheelchair for mobility. However, wants to work on strength and hoping to get physical therapy set up at her assisted living as an outpatient which I agree would be appropriate.  Strength slowly improving.    3/11/2020 -- improved with switch to home xopenex in place of duonebs and with improved COPD " exacerbation - patient was able to transfer with physical therapy yesterday pm and appears to be at or near baseline strength now today and ok for return to assisted living with prior measures in place - however, would benefit from planned home care/PT on discharge.       Chronic Diastolic Congestive Heart Failure   3/9/20 -- Appears euvolemic on exam on exam. Possible mild pulmonary vasculature on CXR, though poor inspiration. Current weight of 187 lb, which is stable. Last echocardiogram on 2/2019 demonstrated an EF of 55-60% and normal LV, normal RV, mild left atrial dilation, mild atrial root dilation and moderate diastolic dysfunction.  - Continue home furosemide daily   - Follow daily weights  - Judicious use of IVF  3/11/2020 -- appears stable.       Coronary Artery Disease  3/9/20 -- Noted in history though unclear diagnosis, no prior cardiac testing on file to verify or notes from cardiology.  - continue home ASA, statin  3/11/2020 -- with unclear diagnosis will plan to stop aspirin as below with starting coumadin - confirm with primary care provider to confirm if there is a need to continue the aspirin along with the coumadin but for now planning to stop it.       Chronic pain, right hip, low back, left shoulder  History of pelvic fracture  3/9/20 -- Follows with pain medicine, managed on acetaminophen, Cymbalta, gabapentin, oxycodone, and tizanidine. Planning on weaning off gabapentin per last residential note. Using low dose of prednisone for shoulder pain.  - continue home Oxycotin, dilaudid prn, prednisone 5 mg daily, gabapentin, duloxetine  3/11/2020 -- at baseline, continue prior to admission medications.      Hypertension  Reasonably stable - continue home lisinopril, furosemide, nifedipine     Hyperlipidemia  - continue home atorvastatin     History of CVA/TIA  S/P Carotid Endarterectomy 2017  Brain aneurysm s/p clipping  3/9/20 -- History of CVA 2017 s/p endarterectomy in 2017 Previous clipping of  aneurysm.  - continue home statin, aspirin  3/11/2020 -- stopping aspirin on discharge and replacing with with coumadin as above     Gastroesophageal reflux disease  - continue home omeprazole     Chronic Normocytic Anemia  3/9/20 -- Hgb 9.4. Baseline recently ~9-10. No longer on home iron.  3/10/2020 -- hemoglobin 8.8, suspect mild dilution, no black or bloody stools or other evidence of acute loss.  Follow.   3/11/2020 -- hemoglobin down a bit this AM, recheck at 11 was 8.7.  Recommend resuming home iron supplement and rechecking hemoglobin at follow-up with primary care provider within 1 week.      History of DVT  Noted in history though cannot find clear documentation of this. Do see notes indicating DVT prophylaxis following surgery as well as history of superficial thrombophlebitis.  Regardless, has now been started on coumadin for atrial fibrillation as above.       Diet: Regular adult diet          Maldonado Catheter: none      Code Status: DNR/DNI                     Discharge Instructions and Follow-Up:     Discharge diet: Orders Placed This Encounter      Mechanical/Dental Soft Diet       Discharge activity: Activity as tolerated   Discharge follow-up: Follow up with primary care provider in 7 days, recheck hemoglobin, BMP and follow-up on COPD and new atrial fibrillation at that time as above.             Discharge Disposition:     Discharged to home (assisted living apartment )      Attestation:  I have reviewed today's vital signs, notes, medications, labs and imaging.  Amount of time performed on this discharge summary: 50 minutes.    Kenton Blackwood MD, MD

## 2020-03-11 NOTE — PROGRESS NOTES
Name: Jazmin Clifton    MRN#: [id number]    Reason for Hospitalization: Gastroenteritis [K52.9]    Discharge Date: 3/11/2020    Patient / Family response to discharge plan: Confirmed plans for patient to return to [facility] (phone: [phone] Fax: [phone]) with [facility] ([phone] Fax: [phone]).  Discussed with SEVERO Menjivar @ [facility].  She is in agreement.  [facility] staff plan to discuss long term plans with Mirian Weldon on 3/12 planned visit.  Call made to sonChi.  He plans to transport patient today around 1500.      Other Providers (Care Coordinator, County Services, PCA services etc): emilio    CTS Hand Off Completed: Yes: completed to [facility]    PAS #: NA    LINN Score: elevated    Future Appointments: No future appointments.    Discharge Disposition: assisted living    Discharge Planner   Discharge Plans in progress: completed: [facility] w/ home care  Barriers to discharge plan: none  Follow up plan: [facility] staff, , home care       Entered by: Janet March 03/11/2020 11:59 AM         ADRIANA ClementsN RN  Inpatient RN care coordinator  [facility] [phone]  [facility] [phone]

## 2020-03-11 NOTE — PLAN OF CARE
Physical Therapy Discharge Summary    Reason for therapy discharge:    Discharged to home.    Progress towards therapy goal(s). See goals on Care Plan in [facility] electronic health record for goal details.  Goals met/ adequate for discharge     Therapy recommendation(s):    Continued therapy is recommended.  Rationale/Recommendations:  continued PT to address transfers; establish pt on an ambulation program .

## 2020-03-12 NOTE — PROGRESS NOTES
TRANSITIONS OF CARE (AJAY) LOG   AJAY tasks should be completed by the CC within one (1) business day of notification of each transition. Follow up contact with member is required after return to their usual care setting.  Note:  If CC finds out about the transitions fifteen (15) days or more after the member has returned to their usual care setting, no AJAY log is needed. However, the CC should check in with the member to discuss the transition process, any changes needed to the care plan and document it in a case note.    Member Name:  Jazmin Clifton [facility] Name:  [employer] Member ID#: [id number]   Product: [facility] Care Coordinator Contact:  Mirian Weldon MA, \[facility]\"" Agency/County/Care System: [facility]   Transition Communication Actions from Care Management Contact   Transition #1   Notification Date: 3- Transition Date:   3- Transition From: Assisted Living, [facility] [address]      Is this the member s usual care setting?               yes Transition To: Hospital, [facility]    Transition Type:  Unplanned  Reason for Admission/Comments:  Gastroenteritis [K52.9]     Shared CC contact info, care plan/services with receiving setting--Date completed: 3-    Notified PCP of transition--Date completed:  3-     via  EMR   Transition #2   Transition #3  (if applicable)   Notification Date: 3-         Transition To:  Assisted Living, [facility] [address]   Transition Date: 3- Transition Type:    Planned  Notified PCP -- Date completed: 3-              Shared CC contact info, care plan/services with receiving setting or, if applicable, home care agency--Date completed:  3-  *Complete additional tasks below, if this transition is a return to usual care setting.      Comments:  Member is due for annual assessment so visit will be done on 3-    Notification Date:          Transition To:    Transition Date:              Transition Type:       Notified PCP--Date completed:          Shared CC contact info, care plan/services with receiving setting or, if applicable, home care agency--Date completed:       *Complete additional tasks below, if this transition is a return to usual care setting.      Comments:       *Complete tasks below when the member is discharging TO their usual care setting within one (1) business day of notification.  For situations where the Care Coordinator is notified of the discharge prior to the date of discharge, the Care Coordinator must follow up with the member or designated representative to confirm that discharge actually occurred and discuss required AJAY tasks as outlined in the AJAY Instructions.  (This includes situations where it may be a  new  usual care setting for the member. (i.e., a community member who decides upon permanent nursing home placement following hospitalization and rehab).    Date completed: 3-  Communicated with member or their designated representative about the following:  care transition process; about changes to the member s health status; plan of care updates; education about transitions and how to prevent unplanned transitions/readmissions  Four Pillars for Optimal Transition:    Check  Yes  - if the member, family member and/or SNF/facility staff manages the following:    If  No  provide explanation in the comments section.          [x]  Yes     []  No     Does the member have a follow-up appointment scheduled with primary care or specialist? (Mental health hospitalizations--the appt. should be w/in 7 days)   [x]  Yes     []  No     Can the member manage their medications or is there a system in place to manage medications (e.g. home care set-up)?         [x]  Yes     []  No     Can the member verbalize warning signs and symptoms to watch for and how to respond?         [x]  Yes     []  No     Does the member use a Personal Health Care Record?  Check  Yes  if visit summary, discharge  summary, and/or healthcare summary are being used as a PHR.                                                                                                                                                                                    [x] Yes      [] No      Have you updated the member s care plan?  If  No  provide explanation in comments.   Comments: 3- annual visit done

## 2020-03-12 NOTE — PROGRESS NOTES
3-  CC contacted RN at the AL and member is set to discharge back to AL today.  Both RN and NP reached out to CC with concerns about member and appropriateness of member for AL due to increased falls and now on blood thinner.   CC stated to both that she would be visiting with member for her annual tomorrow and will f/u with both of them as needed.   Mirian Weldon MA [facility] Care Coordinator   [phone]    '

## 2020-03-13 NOTE — LETTER
3/13/2020        RE: Tamra Clifton  [address]  [facility] [address]        [facility]  [place] [facility] Record Number:  [id number]  Place of Service where encounter took place:  [facility] [[id number]]  Chief Complaint   Patient presents with     RECHECK       HPI:    Tamra Clifton  is a 87 year old (12/30/1932), who is being seen today for an episodic care visit.  HPI information obtained from: facility chart records, facility staff, patient report and [facility] chart review. Today's concern is:     COPD exacerbation (H)  Paroxysmal atrial fibrillation (H)  Nausea  Diarrhea, unspecified type  Chronic diastolic congestive heart failure (H)  Essential hypertension  Iron deficiency anemia, unspecified iron deficiency anemia type  CKD (chronic kidney disease) stage 3, GFR 30-59 ml/min (H)   Patient recently hospitalized with nausea, diarrhea. Found to be hypoxic in ER with O2 sats in the mid 80's. CXR showed possible LLL infiltrate. Steroids increased, started on azithromycin with improvement of symptoms. She was noted to have new onset afib, so was started on coumadin. Weaned back to RA. Breathing improvement, No further nausea or diarrhea. Was discharged back to assisted.     She is seen in her apartment today. Has meeting with care coordinator yesterday - concern that facility is no longer able to meet her needs given her repeat hospitalizations and overall physical decline. Facility nurses have verbalized increased difficulty in cares for patient given her declining physical mobility and complex medication regimen. Tamra states she likes it at the facility, and would prefer today but she does recognize that staff is having a harder time providing her cares and that she is needing more help. She slide down in her chair this AM, and was unable to get herself back up. She states she could not reach her pendant to call for help, but she was able to  reach her phone, so she called her son who called the shelter and asked them to send staff to help her get up.      She feels she is breathing much better, and is feeling much better since the increase in her steroids. She states he cough is resolved, and she does not feel any more SOB. NO more nausea. Does not recall if she had a loose BM this AM or not, but denies any abdominal upset. Thinks overall today she is feeling well. Is mostly concerned about her missing photo album.     Past Medical and Surgical History reviewed in Epic today.    MEDICATIONS:    Current Outpatient Medications   Medication Sig Dispense Refill     ACETAMINOPHEN EXTRA STRENGTH 500 MG tablet TAKE TWO TABLETS (1000MG) BY MOUTH THREE TIMES DAILY (Patient taking differently: Take 1,000 mg by mouth 3 times daily ) 186 tablet PRN     atorvastatin (LIPITOR) 40 MG tablet TAKE 1 TABLET BY MOUTH ONCE DAILY (Patient taking differently: Take 40 mg by mouth daily ) 30 tablet 0     azithromycin (ZITHROMAX) 250 MG tablet Take 1 tablet (250 mg) by mouth daily for 2 days 2 tablet 0     benzocaine (ORAJEL/ANBESOL) 10 % gel Take by mouth 4 times daily as needed for moderate pain       bisacodyl (DULCOLAX) 10 MG suppository Place 10 mg rectally daily as needed for constipation       Blood Glucose Monitoring Suppl CORY 2 times daily Call nurse for further directions if blood glucose is less than 80 or greater than 250       BREO ELLIPTA 100-25 MCG/INH inhaler INHALE 1 PUFF BY MOUTH ONCE DAILY (Patient taking differently: Inhale 1 puff into the lungs daily ) 1 Inhaler 97     calcium carbonate (TUMS) 500 MG chewable tablet Take 1 chew tab by mouth 3 times daily as needed       DOK PLUS 50-8.6 MG tablet TAKE 1 TABLET BY MOUTH TWICE DAILY (Patient taking differently: Take 1 tablet by mouth 2 times daily ) 60 tablet 98     DULoxetine (CYMBALTA) 30 MG capsule TAKE 1 CAPSULE BY MOUTH AT BEDTIME (Patient taking differently: Take 30 mg by mouth daily ) 30 capsule 11      DULoxetine (CYMBALTA) 60 MG capsule TAKE 1 CAPSULE BY MOUTH EVERY MORNING (Patient taking differently: Take 60 mg by mouth daily ) 31 capsule PRN     Emollient (MOISTURIZING LOTION EX) Externally apply topically 2 times daily Bilateral lower extremeties       ferrous sulfate (FEROSUL) 325 (65 Fe) MG tablet Take 1 tablet (325 mg) by mouth daily 30 tablet 1     furosemide (LASIX) 20 MG tablet TAKE 1 TABLET BY MOUTH ONCE DAILY 30 tablet 98     gabapentin (NEURONTIN) 100 MG capsule Take 100 mg by mouth daily       HYDROmorphone (DILAUDID) 2 MG tablet Take 2 mg by mouth 2 times daily as needed for severe pain       hypromellose-dextran (GENTEAL TEARS) 0.1-0.3 % ophthalmic solution Place 1 drop into both eyes every 6 hours as needed for dry eyes       levalbuterol (XOPENEX) 1.25 MG/3ML neb solution Take 1 ampule by nebulization every 4 hours as needed for shortness of breath / dyspnea or wheezing And every 4 hours PRN       Lidocaine (LIDOCARE) 4 % Patch Place 1 patch onto the skin daily To desired area (shoulder or hip). On for 12hrs, off for 12hrs 30 patch 11     lisinopril (PRINIVIL/ZESTRIL) 20 MG tablet Take 1 tablet (20 mg) by mouth daily 30 tablet 11     Menthol, Topical Analgesic, (BIOFREEZE) 4 % GEL Externally apply topically 4 times daily as needed To left shoulder and right hip       NIFEdipine ER (ADALAT CC) 60 MG 24 hr tablet Take 60 mg by mouth daily        nystatin (MYCOSTATIN) 948947 UNIT/GM external powder Apply topically 3 times daily 1 Bottle 97     omeprazole (PRILOSEC) 40 MG DR capsule TAKE 1 CAPSULE BY MOUTH ONCE DAILY (Patient taking differently: Take 40 mg by mouth daily ) 30 capsule 0     ondansetron (ZOFRAN) 4 MG tablet Take 4 mg by mouth every 6 hours as needed for nausea       OXYCONTIN 10 MG 12 hr tablet TAKE ONE TABLET BY MOUTH EVERY 12 HOURS (Patient taking differently: Take 10 mg by mouth every 12 hours DO NOT CRUSH.) 60 tablet 0     predniSONE (DELTASONE) 5 MG tablet TAKE 1 TABLET BY MOUTH  "ONCE DAILY 31 tablet PRN     tiZANidine (ZANAFLEX) 2 MG tablet Take 2 mg by mouth every 8 hours as needed        VITAMIN D3 25 MCG (1000 UT) tablet TAKE 1 TABLET BY MOUTH ONCE DAILY (Patient taking differently: Take 25 mcg by mouth daily ) 30 tablet 97     warfarin ANTICOAGULANT (COUMADIN) 1 MG tablet Take 3 tablets (3 mg) by mouth daily or as directed by the Anticoagulation Clinic 90 tablet 11     predniSONE (DELTASONE) 20 MG tablet Take 2 tablets (40 mg) by mouth daily for 2 days (Patient not taking: Reported on 3/13/2020) 4 tablet 0         REVIEW OF SYSTEMS:  4 point ROS including Respiratory, CV, GI and , other than that noted in the HPI,  is negative    Objective:  BP (!) 181/97   Pulse 89   Temp 98  F (36.7  C)   Resp 22   Ht 1.6 m (5' 3\")   Wt 83.8 kg (184 lb 12.8 oz)   SpO2 97%   BMI 32.74 kg/m    Exam:  GENERAL APPEARANCE:  Alert, in no distress, cooperative  RESP:  respiratory effort and palpation of chest normal, lungs clear to auscultation , no respiratory distress  CV:  Palpation and auscultation of heart done , regular rate and rhythm, no murmur, rub, or gallop, +2 pedal pulses, peripheral edema 1-2+ in BLE  ABDOMEN:  no guarding or rebound, bowel sounds normal, soft, non-tender  M/S:   generazalized weakness, primarily WC bound, no gross joint deformities  SKIN:  Inspection of skin and subcutaneous tissue baseline  NEURO:   Cranial nerves 2-12 are normal tested and grossly at patient's baseline  PSYCH:  oriented X 3, normal insight, judgement and memory, affect and mood normal    Labs:   Recent labs in [facility] reviewed by me today.  and   Most Recent 3 CBC's:  Recent Labs   Lab Test 03/11/20  1121 03/11/20  0450 03/10/20  0450 03/09/20  1251   WBC  --  6.4 4.9 8.1   HGB 8.7* 7.9* 8.8* 9.4*   MCV  --  88 89 89   PLT  --  214 238 261     Most Recent 3 BMP's:  Recent Labs   Lab Test 03/11/20  0450 03/10/20  0450 03/09/20  1251    144 145*   POTASSIUM 3.8 4.1 3.9   CHLORIDE 110* 113* 112* "   CO2 28 27 27   BUN 40* 25 31*   CR 1.30* 1.05* 1.18*   ANIONGAP 6 4 6   BLAIR 9.4 9.6 9.8   * 122* 96       ASSESSMENT/PLAN:  (J44.1) COPD exacerbation (H)  (primary encounter diagnosis)  Comment: Improving, respiratory status appears stable today.   Plan: Continue prednisone 40mg PO x 2 days then resume chronic dose of 5mg daily, continue azithromycin 250mg daily x 2 days (last dose today). Continue breo ellipta, xopenex nabs prn, continue to monitor and adjust     (I48.0) Paroxysmal atrial fibrillation (H)  Comment: New onset, rate controlled. Asymptomatic. Started on on coumadin, goal INR is 2-3.Plan: Continue nifedipine, coumadin dosing per ACC.     (R11.0) Nausea  (R19.7) Diarrhea, unspecified type  Comment: Resolved suspect secondary to cough, laxatives  Plan: Continue to monitor, continue PRN zofran for now.     (I50.32) Chronic diastolic congestive heart failure (H)  (I10) Essential hypertension  Comment: Appears compensated,  BP is elevated today, but suspect secondary to anxiety  Plan: continue lisinopril, nifedipine, daily weights, furosemide, check BP 2x/week as below and update provider. r    (D50.9) Iron deficiency anemia, unspecified iron deficiency anemia type  Comment: Recently attempted to wean off iron supplement, worsening Hgb, so was resumed while IP  Plan: continue iron supplement, CBC to monitor for stability.     (N18.3) CKD (chronic kidney disease) stage 3, GFR 30-59 ml/min (H)  Comment: mild elevation while IP, likely secondary to poor PO  Plan: Avoid nephrotoxic medications, BMP to monitor for stability.     Orders written by provider at facility  1. DC milk of mag  2. BMP, CBC, vitamin D level on next lab day DX: CKD, anemia,   3. Check BP 2x/week x 2 weeks, then update provider.       Electronically signed by:  MARY Gómez CNP             Sincerely,        MARY Gómez CNP

## 2020-03-13 NOTE — TELEPHONE ENCOUNTER
See separate TE for 'INR Results' dated 3/12/20 for INR result and dosing instructions.    Jenn Yoo, RN, BSN  [facility]

## 2020-03-13 NOTE — TELEPHONE ENCOUNTER
ANTICOAGULATION MANAGEMENT     Patient Name:  Jazmin Clifton  Date:  3/13/2020    ASSESSMENT /SUBJECTIVE:      Today's INR result of 1.1 is subtherapeutic. Goal INR of 2.0-3.0      Warfarin dose taken: Warfarin taken as previously instructed    Diet: No new diet changes affecting INR    Medication changes/ interactions: Interaction between azithromycin and prednisone and warfarin may be affecting INR-pt to take azithromycin 3/12-3/14; prednisone 40mg 3/12-3/14 (also takes prednisone long term, usual dose is 10mg daily)    Previous INR: Subtherapeutic     S/S of bleeding or thromboembolism: No    New injury or illness:  No    Upcoming surgery, procedure or cardioversion:  No    Additional findings: Hospitalized 3/9-3/11 for gastroenteritis; started warfarin 3/10 for new a-fib. Discharged to [facility] 3/11/20.            PLAN:    Spoke with Tiara (RN @ [facility]) regarding INR result and instructed:     Warfarin Dosing Instructions: 4mg Fri/4mg Sat/2.5mg Sun     Instructed patient to follow up no later than: 3/16  Orders given to  Homecare nurse/facility to recheck     Orders faxed to [facility] [phone]    Education provided: No    Tiara verbalizes understanding and agrees to warfarin dosing plan.    Instructed to call the Anticoagulation Clinic for any changes, questions or concerns. (#[phone])        OBJECTIVE:  INR   Date Value Ref Range Status   2020 1.1 0.90 - 1.10 Final             Anticoagulation Summary  As of 3/13/2020    INR goal:   2.0-3.0   TTR:   --   INR used for dosin.1! (3/13/2020)   Warfarin maintenance plan:   No maintenance plan   Full warfarin instructions:   3/13: 5 mg; 3/14: 5 mg; 3/15: 2.5 mg   Plan last modified:   Deysi Peralta RN (3/11/2020)   Next INR check:   3/16/2020   Priority:   High   Target end date:   3/11/2021    Indications    Paroxysmal atrial fibrillation (H) [I48.0]  Atrial fibrillation  unspecified type (H) [I48.91]  History of DVT  (deep vein thrombosis) [Z86.718]  CVA (cerebral vascular accident) (H) (Resolved) [I63.9]             Anticoagulation Episode Summary     INR check location:       Preferred lab:       Send INR reminders to:   GUSTAVO FARAH    Comments:   History of CVA 2017 s/p endarterectomy in 2017 Previous clipping of aneurysm. [facility]. Lives at [facility]. Facility lab days are MON/THURS. Fax orders to [phone]      Anticoagulation Care Providers     Provider Role Specialty Phone number    Junie Estrella APRN CNP Responsible Nurse Practitioner - Gerontology [phone]

## 2020-03-13 NOTE — TELEPHONE ENCOUNTER
Yessy Duncan, returned call that facility nurse will be drawing INR today.    [place] hours and phone number given to Emy.    Michelle Alvarez, RN  [facility]

## 2020-03-13 NOTE — PROGRESS NOTES
[facility]  [facility] Medical Record Number:  [id number]  Place of Service where encounter took place:  [facility] [[id number]]  Chief Complaint   Patient presents with     RECHECK       HPI:    Tamra Clifton  is a 87 year old (12/30/1932), who is being seen today for an episodic care visit.  HPI information obtained from: facility chart records, facility staff, patient report and [facility] Epic chart review. Today's concern is:     COPD exacerbation (H)  Paroxysmal atrial fibrillation (H)  Nausea  Diarrhea, unspecified type  Chronic diastolic congestive heart failure (H)  Essential hypertension  Iron deficiency anemia, unspecified iron deficiency anemia type  CKD (chronic kidney disease) stage 3, GFR 30-59 ml/min (H)   Patient recently hospitalized with nausea, diarrhea. Found to be hypoxic in ER with O2 sats in the mid 80's. CXR showed possible LLL infiltrate. Steroids increased, started on azithromycin with improvement of symptoms. She was noted to have new onset afib, so was started on coumadin. Weaned back to RA. Breathing improvement, No further nausea or diarrhea. Was discharged back to [facility].     She is seen in her apartment today. Has meeting with care coordinator yesterday - concern that facility is no longer able to meet her needs given her repeat hospitalizations and overall physical decline. Facility nurses have verbalized increased difficulty in cares for patient given her declining physical mobility and complex medication regimen. Tamra states she likes it at the facility, and would prefer today but she does recognize that staff is having a harder time providing her cares and that she is needing more help. She slide down in her chair this AM, and was unable to get herself back up. She states she could not reach her pendant to call for help, but she was able to reach her phone, so she called her son who called the [facility] and asked them to send staff to help  her get up.      She feels she is breathing much better, and is feeling much better since the increase in her steroids. She states he cough is resolved, and she does not feel any more SOB. NO more nausea. Does not recall if she had a loose BM this AM or not, but denies any abdominal upset. Thinks overall today she is feeling well. Is mostly concerned about her missing photo album.     Past Medical and Surgical History reviewed in Epic today.    MEDICATIONS:    Current Outpatient Medications   Medication Sig Dispense Refill     ACETAMINOPHEN EXTRA STRENGTH 500 MG tablet TAKE TWO TABLETS (1000MG) BY MOUTH THREE TIMES DAILY (Patient taking differently: Take 1,000 mg by mouth 3 times daily ) 186 tablet PRN     atorvastatin (LIPITOR) 40 MG tablet TAKE 1 TABLET BY MOUTH ONCE DAILY (Patient taking differently: Take 40 mg by mouth daily ) 30 tablet 0     azithromycin (ZITHROMAX) 250 MG tablet Take 1 tablet (250 mg) by mouth daily for 2 days 2 tablet 0     benzocaine (ORAJEL/ANBESOL) 10 % gel Take by mouth 4 times daily as needed for moderate pain       bisacodyl (DULCOLAX) 10 MG suppository Place 10 mg rectally daily as needed for constipation       Blood Glucose Monitoring Suppl CORY 2 times daily Call nurse for further directions if blood glucose is less than 80 or greater than 250       BREO ELLIPTA 100-25 MCG/INH inhaler INHALE 1 PUFF BY MOUTH ONCE DAILY (Patient taking differently: Inhale 1 puff into the lungs daily ) 1 Inhaler 97     calcium carbonate (TUMS) 500 MG chewable tablet Take 1 chew tab by mouth 3 times daily as needed       DOK PLUS 50-8.6 MG tablet TAKE 1 TABLET BY MOUTH TWICE DAILY (Patient taking differently: Take 1 tablet by mouth 2 times daily ) 60 tablet 98     DULoxetine (CYMBALTA) 30 MG capsule TAKE 1 CAPSULE BY MOUTH AT BEDTIME (Patient taking differently: Take 30 mg by mouth daily ) 30 capsule 11     DULoxetine (CYMBALTA) 60 MG capsule TAKE 1 CAPSULE BY MOUTH EVERY MORNING (Patient taking  differently: Take 60 mg by mouth daily ) 31 capsule PRN     Emollient (MOISTURIZING LOTION EX) Externally apply topically 2 times daily Bilateral lower extremeties       ferrous sulfate (FEROSUL) 325 (65 Fe) MG tablet Take 1 tablet (325 mg) by mouth daily 30 tablet 1     furosemide (LASIX) 20 MG tablet TAKE 1 TABLET BY MOUTH ONCE DAILY 30 tablet 98     gabapentin (NEURONTIN) 100 MG capsule Take 100 mg by mouth daily       HYDROmorphone (DILAUDID) 2 MG tablet Take 2 mg by mouth 2 times daily as needed for severe pain       hypromellose-dextran (GENTEAL TEARS) 0.1-0.3 % ophthalmic solution Place 1 drop into both eyes every 6 hours as needed for dry eyes       levalbuterol (XOPENEX) 1.25 MG/3ML neb solution Take 1 ampule by nebulization every 4 hours as needed for shortness of breath / dyspnea or wheezing And every 4 hours PRN       Lidocaine (LIDOCARE) 4 % Patch Place 1 patch onto the skin daily To desired area (shoulder or hip). On for 12hrs, off for 12hrs 30 patch 11     lisinopril (PRINIVIL/ZESTRIL) 20 MG tablet Take 1 tablet (20 mg) by mouth daily 30 tablet 11     Menthol, Topical Analgesic, (BIOFREEZE) 4 % GEL Externally apply topically 4 times daily as needed To left shoulder and right hip       NIFEdipine ER (ADALAT CC) 60 MG 24 hr tablet Take 60 mg by mouth daily        nystatin (MYCOSTATIN) 536777 UNIT/GM external powder Apply topically 3 times daily 1 Bottle 97     omeprazole (PRILOSEC) 40 MG DR capsule TAKE 1 CAPSULE BY MOUTH ONCE DAILY (Patient taking differently: Take 40 mg by mouth daily ) 30 capsule 0     ondansetron (ZOFRAN) 4 MG tablet Take 4 mg by mouth every 6 hours as needed for nausea       OXYCONTIN 10 MG 12 hr tablet TAKE ONE TABLET BY MOUTH EVERY 12 HOURS (Patient taking differently: Take 10 mg by mouth every 12 hours DO NOT CRUSH.) 60 tablet 0     predniSONE (DELTASONE) 5 MG tablet TAKE 1 TABLET BY MOUTH ONCE DAILY 31 tablet PRN     tiZANidine (ZANAFLEX) 2 MG tablet Take 2 mg by mouth every 8  "hours as needed        VITAMIN D3 25 MCG (1000 UT) tablet TAKE 1 TABLET BY MOUTH ONCE DAILY (Patient taking differently: Take 25 mcg by mouth daily ) 30 tablet 97     warfarin ANTICOAGULANT (COUMADIN) 1 MG tablet Take 3 tablets (3 mg) by mouth daily or as directed by the Anticoagulation Clinic 90 tablet 11     predniSONE (DELTASONE) 20 MG tablet Take 2 tablets (40 mg) by mouth daily for 2 days (Patient not taking: Reported on 3/13/2020) 4 tablet 0         REVIEW OF SYSTEMS:  4 point ROS including Respiratory, CV, GI and , other than that noted in the HPI,  is negative    Objective:  BP (!) 181/97   Pulse 89   Temp 98  F (36.7  C)   Resp 22   Ht 1.6 m (5' 3\")   Wt 83.8 kg (184 lb 12.8 oz)   SpO2 97%   BMI 32.74 kg/m    Exam:  GENERAL APPEARANCE:  Alert, in no distress, cooperative  RESP:  respiratory effort and palpation of chest normal, lungs clear to auscultation , no respiratory distress  CV:  Palpation and auscultation of heart done , regular rate and rhythm, no murmur, rub, or gallop, +2 pedal pulses, peripheral edema 1-2+ in BLE  ABDOMEN:  no guarding or rebound, bowel sounds normal, soft, non-tender  M/S:   generazalized weakness, primarily WC bound, no gross joint deformities  SKIN:  Inspection of skin and subcutaneous tissue baseline  NEURO:   Cranial nerves 2-12 are normal tested and grossly at patient's baseline  PSYCH:  oriented X 3, normal insight, judgement and memory, affect and mood normal    Labs:   Recent labs in [facility] reviewed by me today.  and   Most Recent 3 CBC's:  Recent Labs   Lab Test 03/11/20  1121 03/11/20  0450 03/10/20  0450 03/09/20  1251   WBC  --  6.4 4.9 8.1   HGB 8.7* 7.9* 8.8* 9.4*   MCV  --  88 89 89   PLT  --  214 238 261     Most Recent 3 BMP's:  Recent Labs   Lab Test 03/11/20  0450 03/10/20  0450 03/09/20  1251    144 145*   POTASSIUM 3.8 4.1 3.9   CHLORIDE 110* 113* 112*   CO2 28 27 27   BUN 40* 25 31*   CR 1.30* 1.05* 1.18*   ANIONGAP 6 4 6   BLAIR 9.4 9.6 9.8 "   * 122* 96       ASSESSMENT/PLAN:  (J44.1) COPD exacerbation (H)  (primary encounter diagnosis)  Comment: Improving, respiratory status appears stable today.   Plan: Continue prednisone 40mg PO x 2 days then resume chronic dose of 5mg daily, continue azithromycin 250mg daily x 2 days (last dose today). Continue breo ellipta, xopenex nabs prn, continue to monitor and adjust     (I48.0) Paroxysmal atrial fibrillation (H)  Comment: New onset, rate controlled. Asymptomatic. Started on on coumadin, goal INR is 2-3.Plan: Continue nifedipine, coumadin dosing per [facility].     (R11.0) Nausea  (R19.7) Diarrhea, unspecified type  Comment: Resolved suspect secondary to cough, laxatives  Plan: Continue to monitor, continue PRN zofran for now.     (I50.32) Chronic diastolic congestive heart failure (H)  (I10) Essential hypertension  Comment: Appears compensated,  BP is elevated today, but suspect secondary to anxiety  Plan: continue lisinopril, nifedipine, daily weights, furosemide, check BP 2x/week as below and update provider. r    (D50.9) Iron deficiency anemia, unspecified iron deficiency anemia type  Comment: Recently attempted to wean off iron supplement, worsening Hgb, so was resumed while IP  Plan: continue iron supplement, CBC to monitor for stability.     (N18.3) CKD (chronic kidney disease) stage 3, GFR 30-59 ml/min (H)  Comment: mild elevation while IP, likely secondary to poor PO  Plan: Avoid nephrotoxic medications, BMP to monitor for stability.     Orders written by provider at facility  1. DC milk of mag  2. BMP, CBC, vitamin D level on next lab day DX: CKD, anemia,   3. Check BP 2x/week x 2 weeks, then update provider.       Electronically signed by:  MARY Gómez CNP

## 2020-03-13 NOTE — TELEPHONE ENCOUNTER
Patient is brand new to warfarin, discharged from hospital this week. Patient to have INR checked today by either home care or [facility].  Called and left message for Perla allison at the [facility] and the [facility] to determine if this is scheduled.    Awaiting call back to determine plan.    Michelle Alvarez RN  Anticoagulation Clinic

## 2020-03-16 NOTE — TELEPHONE ENCOUNTER
Henrietta ([facility] RN) called to ask if pt had an INR drawn today.  No INR currently pending or resulted in the system. INR was to have been drawn at pts Assisted Living Facility ([facility]) today. Pt is new to warfarin and today is day #7. Pt was on azithromycin and prednisone through 3/14.     Left message at the [facility] for a nurse to call Central [phone] to determine if INR was drawn today.    Henrietta also requesting a call back @ [phone] to inform her of date of next INR. Henrietta is able to check INR tomorrow (3/17) if the [facility] was unable to draw it today.    Jenn Jim, RN on 3/16/2020 at 4:26 PM  [facility]

## 2020-03-16 NOTE — TELEPHONE ENCOUNTER
No INR resulted today. Spoke to Tiara at [facility] and advised a 3mg dose of warfarin tonight. Orders faxed to [facility] [phone].    Spoke to Henrietta, ([facility] RN), and advised that pt will need an INR tomorrow as she is a new start on warfarin. Henrietta will test pts INR tomorrow and contact [facility] @ [phone] w/ results.    Jenn Jim RN on 3/16/2020 at 5:09 PM  [facility]

## 2020-03-17 NOTE — TELEPHONE ENCOUNTER
ANTICOAGULATION MANAGEMENT     Patient Name:  Jazmin Clifton  Date:  3/17/2020    ASSESSMENT /SUBJECTIVE:      Today's INR result of 2.2 is therapeutic. Goal INR of 2.0-3.0 . Patient is on day #7 of warfarin      Warfarin dose taken: Warfarin taken as previously instructed    Diet: No new diet changes affecting INR    Medication changes/ interactions: No new medications/supplements affecting INR    Previous INR: Subtherapeutic     S/S of bleeding or thromboembolism: No    New injury or illness:  No    Upcoming surgery, procedure or cardioversion:  No    Additional findings: noted azithromycin and higher prednisone dose last week, patient recently hospitalized for gastroenteritis (resolved per report)      PLAN:    Spoke with Henrietta regarding INR result and instructed:     Warfarin Dosing Instructions: 3mg daily    Instructed patient to follow up no later than: Thur 3/19 or Fri 3/20 Orders given to  Homecare nurse/facility to recheck    Education provided: Yes: reviewed target goal range      Henrietta verbalizes understanding and agrees to warfarin dosing plan.    Instructed to call the Anticoagulation Clinic for any changes, questions or concerns. (#[phone])     Orders/encounter faxed to facility       OBJECTIVE:  INR   Date Value Ref Range Status   2020 2.2 (A) 0.90 - 1.10 Final             Anticoagulation Summary  As of 3/17/2020    INR goal:   2.0-3.0   TTR:   --   INR used for dosin.2 (3/17/2020)   Warfarin maintenance plan:   3 mg (1 mg x 3) every day   Full warfarin instructions:   3 mg every day   Weekly warfarin total:   21 mg   Plan last modified:   Rizwana Joseph RN (3/17/2020)   Next INR check:   3/20/2020   Priority:   High   Target end date:   3/11/2021    Indications    Paroxysmal atrial fibrillation (H) [I48.0]  Atrial fibrillation  unspecified type (H) [I48.91]  History of DVT (deep vein thrombosis) [Z86.718]  CVA (cerebral vascular accident) (H) (Resolved) [I63.9]              Anticoagulation Episode Summary     INR check location:       Preferred lab:       Send INR reminders to:   GUSTAVO FARAH    Comments:   History of CVA 2017 s/p endarterectomy in 2017 Previous clipping of aneurysm. [facility]. Lives at [facility]. Facility lab days are MON/THURS. Fax orders to [phone] Phone: [phone]      Anticoagulation Care Providers     Provider Role Specialty Phone number    Junie Estrella APRN CNP Responsible Nurse Practitioner - Gerontology [phone]

## 2020-03-20 NOTE — TELEPHONE ENCOUNTER
ANTICOAGULATION MANAGEMENT     Patient Name:  Jazmin Clifton  Date:  3/20/2020    ASSESSMENT /SUBJECTIVE:      Today's INR result of 2.9 is therapeutic. Goal INR of 2.0-3.0      Warfarin dose taken: Warfarin taken as previously instructed    Diet: No new diet changes affecting INR    Medication changes/ interactions: No new medications/supplements affecting INR    Previous INR: Therapeutic     S/S of bleeding or thromboembolism: No    New injury or illness:  No    Upcoming surgery, procedure or cardioversion:  No    Additional findings: Initiating      PLAN:    Spoke with Nurse Stephanie regarding INR result and instructed:     Warfarin Dosing Instructions: Continue your current warfarin dose    Instructed patient to follow up no later than: 4 days  Orders given to  Homecare nurse/facility to recheck    Education provided: No      Nurse Stephanie verbalizes understanding and agrees to warfarin dosing plan.    Instructed to call the Anticoagulation Clinic for any changes, questions or concerns. (#[phone])        OBJECTIVE:  INR   Date Value Ref Range Status   2020 2.9 (A) 0.90 - 1.10 Final             Anticoagulation Summary  As of 3/20/2020    INR goal:   2.0-3.0   TTR:   --   INR used for dosin.9 (3/20/2020)   Warfarin maintenance plan:   3 mg (1 mg x 3) every day   Full warfarin instructions:   3 mg every day   Weekly warfarin total:   21 mg   Plan last modified:   Rizwana Joseph RN (3/17/2020)   Next INR check:   3/24/2020   Priority:   High   Target end date:   3/11/2021    Indications    Paroxysmal atrial fibrillation (H) [I48.0]  Atrial fibrillation  unspecified type (H) [I48.91]  History of DVT (deep vein thrombosis) [Z86.718]  CVA (cerebral vascular accident) (H) (Resolved) [I63.9]             Anticoagulation Episode Summary     INR check location:       Preferred lab:       Send INR reminders to:   GUSTAVO FARAH    Comments:   History of CVA 2017 s/p endarterectomy in 2017 Previous  clipping of aneurysm. [facility]. Lives at [facility]. Facility lab days are MON/THURS. Fax orders to [phone] Phone: [phone]      Anticoagulation Care Providers     Provider Role Specialty Phone number    Junie Estrella APRN CNP Responsible Nurse Practitioner - Gerontology [phone]

## 2020-03-24 NOTE — TELEPHONE ENCOUNTER
ANTICOAGULATION MANAGEMENT     Patient Name:  Jazmin Clifton  Date:  3/24/2020    ASSESSMENT /SUBJECTIVE:      Today's INR result of 3.4 is supratherapeutic. Goal INR of 2.0-3.0      Warfarin dose taken: Warfarin taken as previously instructed    Diet: No new diet changes affecting INR    Medication changes/ interactions: No new medications/supplements affecting INR    Previous INR: Therapeutic     S/S of bleeding or thromboembolism: No    New injury or illness:  No    Upcoming surgery, procedure or cardioversion:  No    Additional findings: none      PLAN:    Spoke with Alexi home care nurse, regarding INR result and instructed:     Warfarin Dosing Instructions: Take 2 mg today only then change your warfarin dose to 2 mg every Wed, Sat; 3 mg all other days (9.5% change)    Instructed patient to follow up no later than: Fri 3/27  Orders given to  Homecare nurse/facility to recheck    Education provided:       Alexi verbalizes understanding and agrees to warfarin dosing plan.    Instructed to call the Anticoagulation Clinic for any changes, questions or concerns. (#[phone])        OBJECTIVE:  INR   Date Value Ref Range Status   03/24/2020 3.4 (A) 0.90 - 1.10 Final             Anticoagulation Summary  As of 3/24/2020    INR goal:   2.0-3.0   TTR:   20.0 % (4 d)   INR used for dosing:   3.4! (3/24/2020)   Warfarin maintenance plan:   2 mg (1 mg x 2) every Wed, Sat; 3 mg (1 mg x 3) all other days   Full warfarin instructions:   3/24: 2 mg; Otherwise 2 mg every Wed, Sat; 3 mg all other days   Weekly warfarin total:   19 mg   Plan last modified:   Radha Smiley RN (3/24/2020)   Next INR check:   3/27/2020   Priority:   High   Target end date:   3/11/2021    Indications    Paroxysmal atrial fibrillation (H) [I48.0]  Atrial fibrillation  unspecified type (H) [I48.91]  History of DVT (deep vein thrombosis) [Z86.718]  CVA (cerebral vascular accident) (H) (Resolved) [I63.9]             Anticoagulation Episode  Summary     INR check location:       Preferred lab:       Send INR reminders to:   GUSTAVO FARAH    Comments:   History of CVA 2017 s/p endarterectomy in 2017 Previous clipping of aneurysm. [facility]. Lives at [facility]. Facility lab days are MON/THURS. Fax orders to [phone] Phone: [phone]      Anticoagulation Care Providers     Provider Role Specialty Phone number    Junie Estrella APRN CNP Responsible Nurse Practitioner - Gerontology [phone]

## 2020-03-25 NOTE — PROGRESS NOTES
[facility] Care Coordination Contact    [facility] Home Visit Assessment     Home visit for Health Risk Assessment with Jazmin STAHL Etienne completed on March 12, 2020    Type of residence:: Assisted living - [facility] on [address]         Assessment completed with:: Patient, Care Team Member- staff at AL      Current Care Plan  Member currently receiving the following home care services: Physicial Therapy, Occupational Therapy which was ordered at discharge from the hospital   Member currently receiving the following community resources:    none    Medication Review  Medication reconciliation completed in Epic: If no, please explain medication managed by [facility] NP at the AL    Medication set-up & administration: RN set up weekly.  Assisting Living staff administers medications.  Medication Risk Assessment Medication (1 or more, place referral to MTM): N/A: No risk factors identified  MTM Referral Placed: No: No risk factors idenified    Mental/Behavioral Health   Depression Screening: See PHQ assessment flowsheet.   Mental health DX:: Yes   Mental health DX how managed:: Medication  Mental Health Diagnosis: Yes: depression   Mental Health Services: None: No further intervention needed at this time.    Falls Assessment:   Fallen 2 or more times in the past year?: Yes   Any fall with injury in the past year?: Yes    ADL/IADL Dependencies:   Dependent ADLs:: Bathing, Dressing, Grooming, Incontinence, Positioning, Transfers, Wheelchair-with assist, Toileting  Dependent IADLs:: Cleaning, Cooking, Laundry, Shopping, Meal Preparation, Medication Management, Money Management, Transportation, Incontinence    [facility] sponsored benefits: Shared information re: Silver Sneakers/gym memberships, ASA, Calcium +D.    PCA Assessment completed at visit: Not applicable     Elderly Waiver Eligibility: Yes-will continue on EW    Care Plan & Recommendations: prior to member being discharge from hospital AL staff had concerns  about member on going concerns and struggles with transfers.  Member now at discharge is on blood thinner which is also concerning to staff due to hx of falls.  Prior to admission transfer pole had been installed in member room and she reported to hospital staff she did not think it was helpful at all.     CC initiated conversation with member during visit about looking into other housing options which may be able to assist member with her transfers if they continue to decline.  Current AL does not support using a mechanical lift in her apartment and she is not appropriate cognitively to move to memory care where they do accommodate this.      Member too is concerned that AL is wanting her to leave.  CC clarified and discussed with member that they are not asking her to leave nor have they given her notice when CC spoke to RN prior to assessment.  CC stated that AL does have limitations on the having residents needing 2 person or mechanical assist with transfers and this is becoming more a concern for member due to decline in her health.    Member agreed that she has seen change in her ability to transfer but still is holding on to hope that PT will be able to work with her to increase strength in upper body to use the transfer pole in a better way and also lower body stength to help her get up.      CC did engage member in conversation about what the next steps or place she may want to look at moving to and member was open for this conversation and said she would talk to her sons as well.  CC asked that she share with them CC contact information and encourage them to call if they have questions as well.  Member was thinking of maybe moving in with one of them and CC said she would like to have a care conference with this family member prior to moving so family would have an idea of what this would entail.  Member agreed.  She also said that CC could look around the [facility] area for other  options for her.      See [facility] for detailed assessment information.    Follow-Up Plan: Member informed of future contact, plan to f/u with member with a 6 month telephone assessment.  Contact information shared with member and family, encouraged member to call with any questions or concerns at any time.    [facility] care continuum providers: Please refer to Health Care Home on the Epic Problem List to view this patient's [facility] Care Plan Summary.    Mirian Weldon MA [facility] Care Coordinator   [phone]

## 2020-03-25 NOTE — LETTER
March 25, 2020    Important [employer] Information    JAZMIN REID  [id number]   [facility] [place] [address]  Your Care Plan  Dear Jazmin,  When we spoke recently, I promised to send you a Care Plan. The plan enclosed is a summary of our discussion. It includes the steps we agreed would help you meet your health goals. In addition, I can help you with:  Xabtvmx-D-EwzaZJ  This program is available to members who need a ride to medical and dental visits. To schedule a ride, call [phone] or [phone] (toll free). TTY/TTD: 711. You can call 8 a.m. to 8 p.m. Seven days a week. Access to a representative may be limited at times.    Sapheon   The Sapheon program empowers you to improve your health through education and exercise. To learn more, visit [employer], or call [employer] at [phone] (toll free) (TTY:711) from 7 a.m. - 7 p.m. Central Time, Monday-Friday.  Health Care Directive   This form helps you outline your health care wishes. You can request a form from me and I will answer any questions you have before you discuss it with your doctor.   Annual Physical  Take a key step on your path to good health and set up an annual physical at your clinic.  Questions?  Call me at [phone] Monday-Friday between 8am and 5pm.  TTY/TTD: 711. As we discussed, I plan to be in touch with you again in 6 months to follow up via phone.  Sincerely,    Mirian Weldon MA, LSW    E-mail: [email or web address]  Phone: [phone]      [employer]          cc: member records                    Civil Rights Notice  Discrimination is against the law. [employer] does not discriminate on the basis of any of the following:    Race    Color    National Origin    Creed    Holiness    Age    Public Assistance Status    Receipt of Health Care Services    Disability (including physical or mental impairment)    Sex (including sex stereotypes and gender identity)    Marital  Status    Political Beliefs    Medical Condition    Genetic Information    Sexual Orientation    Claims Experience    Medical History    Health Status    Auxiliary Aids and Services:  [employer] provides auxiliary aids and services, like qualified interpreters or information in accessible formats, free of charge and in a timely manner, to ensure an equal opportunity to participate in our health care programs. Contact [employer] Customer Service at Dr. Scribbles/contact medicaid or call [phone] (toll free); TTY:711 or at Dr. Scribbles/contactmedicaid.    Language Assistance Services:  [employer] provides translated documents and spoken language interpreting, free of charge and in a timely manner, when language assistance services are necessary to ensure limited English speakers have meaningful access to our information and services. Contact [employer] at -[phone] (toll free); TTY: 527 or Dr. Scribbles/contactmedicaid.     Civil Rights Complaints  You have the right to file a discrimination complaint if you believe you were treated in a discriminatory way by [employer]. You may contact any of the following four agencies directly to file a discrimination complaint.    U.S. Department of Health and Human Services  Office for Civil Rights (OCR)  You have the right to file a complaint with the OCR, a federal agency, if you believe you have been discriminated against because of any of the following:    Race    Disability    Color    Sex    National Origin    Age      Contact the OCR directly to file a complaint:         Director         U.S. Department of Health and Human Services  Office for Civil Rights         [address] [place] [address], [place] [address]         [phone] (Voice)         [phone] (TDD)         Complaint Portal - https://ocrportal.hhs.gov/ocr/portal/lobby.jsf     [place] Department of Human Rights ([facility])  In [place], you have the right to file a complaint with  the MDHR if you believe you have been discriminated against because of any of the following:      Race    Color    National Origin    Nondenominational    Creed    Sex    Sexual Orientation    Marital Status    Public Assistance Status    Disability    Contact the MDHR directly to file a complaint:         [place] Department of Human Rights         [facility], [address] [place] [address]         [phone] (voice)          [phone] (toll free)         711 or [phone] ([place] Relay)         [phone] (Fax)         [email or web address]. (Email)     [place] Department of Human Services (DHS)  You have the right to file a complaint with [facility] if you believe you have been discriminated against in our health care programs because of any of the following:    Race    Color    National Origin    Creed    Nondenominational    Age    Public Assistance Status    Receipt of Health Care Services    Disability (including physical or mental impairment)    Sex (including sex stereotypes and gender identity)    Marital Status    Political Beliefs    Medical Condition    Genetic Information    Sexual Orientation    Claims Experience    Medical History    Health Status    Complaints must be in writing and filed within 180 days of the date you discovered the alleged discrimination. The complaint must contain your name and address and describe the discrimination you are complaining about. After we get your complaint, we will review it and notify you in writing about whether we have authority to investigate. If we do, we will investigate the complaint.      [facility] will notify you in writing of the investigation s outcome. You have a right to appeal the outcome if you disagree with the decision. To appeal, you must send a written request to have [facility] review the investigation outcome period. Be brief and state why you disagree with the decision. Include additional information you think is important.      If you file a  complaint in this way, the people who work for the agency named in the complaint cannot retaliate against you. This means they cannot punish you in any way for filing a complaint. Filing a complaint in this way does not stop you from seeking out other legal or administration actions.     Contact DHS directly to file a discrimination complaint:        Civil Rights Coordinator        [facility]        Equal Opportunity and Access Division        [address] [address]        [place] [address] [phone] (voice) or use your preferred relay service     [employer] Complaint Notice   You have the right to file a complaint with [employer] if you believe you have been discriminated against because of any of the following:       Medical condition    Health status    Receipt of health care services    Claims experience    Medical history    Genetic information    Disability (including mental or physical impairment)    Marital status    Age    Sex (including sex stereotypes and gender identity)    Sexual orientation    National origin    Race    Color    Spiritism    Creed    Public assistance status    Political beliefs    You can file a complaint and ask for help in filing a complaint in person or by mail, phone, fax, or email at:     [employer] Civil Rights Coordinator  [employer] [facility]  [address], [place] [address] [phone] (voice and fax) or TTY:511  Email: [email or web address]    American Indians can continue to use [facility] and [facility] ([facility] clinics. We will not require prior approval or impose any conditions for you to get services at these clinics. For elders age 65 years and older this includes Elderly Waiver (EW) services accessed through the [employer]. If a doctor or other provider in a [facility] or [facility] clinic refers you to a provider in our network, we will not require you to see your primary care provider prior to the referral.    For  accessible formats of this publication or assistance with equal or access to our services, visit [email or web address], or call [phone] (toll free) or use your preferred relay service.

## 2020-03-25 NOTE — PROGRESS NOTES
[facility] Care Coordination Contact    Received after visit chart from care coordinator.  Completed following tasks: Mailed copy of care plan to client, Updated services in access and Submitted referrals/auths for assisted living  Chart was returned to CC.   , Mailed copy of CL tool to member, faxed copy to [facility] facility, uploaded into [employer] and submitted authorization to health plan.   and Provider Signature - No POC Shared:  Member indicates that they do not want their POC shared with any EW providers.     Analisa Dorsey  Care Management Specialist   [facility]   [phone]

## 2020-03-25 NOTE — PROGRESS NOTES
3-19-20   CC did receive VM from member son Chi asking for call back from CC to discuss member annual visit.   CC did call back and got VM so asked for call back again   Mirian Weldon MA [facility] Coordinator   [phone]

## 2020-03-27 NOTE — TELEPHONE ENCOUNTER
custodial staff updated on blood pressures over past 2 weeks:    152/80  134/73  117/65  178/81    Overall at goal, suspect elevated when having pain or anxious. Will continue current regimen.     MARY Gómez CNP

## 2020-03-31 NOTE — TELEPHONE ENCOUNTER
ANTICOAGULATION MANAGEMENT     Patient Name:  Jazmin Clifton  Date:  3/31/2020    ASSESSMENT /SUBJECTIVE:      Today's INR result of 3.5 is supratherapeutic. Goal INR of 2.0-3.0      Warfarin dose taken: Warfarin taken as previously instructed    Diet: No new diet changes affecting INR    Medication changes/ interactions: No new medications/supplements affecting INR    Previous INR: Supratherapeutic     S/S of bleeding or thromboembolism: No    New injury or illness:  No    Upcoming surgery, procedure or cardioversion:  No    Additional findings: None      PLAN:    Spoke with [facility] nurse regarding INR result and instructed:     Warfarin Dosing Instructions: hold tonight then change your warfarin dose to 2 mg on mon/wed/fri and 3 mg all other day    Instructed patient to follow up no later than: [id number]  Orders given to  Homecare nurse/facility to recheck    Education provided: Alie Shrestha  nurse verbalizes understanding and agrees to warfarin dosing plan.    Instructed to call the Anticoagulation Clinic for any changes, questions or concerns. (#[phone])        OBJECTIVE:  INR   Date Value Ref Range Status   03/31/2020 3.5 (A) 0.90 - 1.10 Final             Anticoagulation Summary  As of 3/31/2020    INR goal:   2.0-3.0   TTR:   37.1 % (1.6 wk)   INR used for dosing:   3.5! (3/31/2020)   Warfarin maintenance plan:   2 mg (1 mg x 2) every Mon, Wed, Fri; 3 mg (1 mg x 3) all other days   Full warfarin instructions:   3/31: Hold; Otherwise 2 mg every Mon, Wed, Fri; 3 mg all other days   Weekly warfarin total:   18 mg   Plan last modified:   Michelle Alvarez RN (3/31/2020)   Next INR check:   4/3/2020   Priority:   High   Target end date:   3/11/2021    Indications    Paroxysmal atrial fibrillation (H) [I48.0]  Atrial fibrillation  unspecified type (H) [I48.91]  History of DVT (deep vein thrombosis) [Z86.718]  CVA (cerebral vascular accident) (H) (Resolved) [I63.9]             Anticoagulation  Episode Summary     INR check location:       Preferred lab:       Send INR reminders to:   GUSTAVO FARAH    Comments:   History of CVA 2017 s/p endarterectomy in 2017 Previous clipping of aneurysm. [facility]. Lives at [facility]. Facility lab days are MON/THURS. Fax orders to [phone] Phone: [phone]      Anticoagulation Care Providers     Provider Role Specialty Phone number    Junie Estrella APRN CNP Responsible Nurse Practitioner - Gerontology [phone]

## 2020-04-03 NOTE — TELEPHONE ENCOUNTER
ANTICOAGULATION MANAGEMENT     Patient Name:  Jazmin Clifton  Date:  4/3/2020    ASSESSMENT /SUBJECTIVE:    Today's INR result of 2.1 is therapeutic. Goal INR of 2.0-3.0      Warfarin dose taken: Warfarin recently held as instructed which may be affecting INR    Diet: No new diet changes affecting INR    Medication changes/ interactions: No new medications/supplements affecting INR    Previous INR: Supratherapeutic     S/S of bleeding or thromboembolism: No    New injury or illness: No    Upcoming surgery, procedure or cardioversion: No    Additional findings: None      PLAN:    Spoke with [facility] nurse,  regarding INR result and instructed:     Warfarin Dosing Instructions: Continue your current warfarin dose 2 mg on MWF and 3 mg all other days    Instructed patient to follow up no later than: 1 week  Orders given to  Homecare nurse/facility to recheck    Education provided: Target INR goal and significance of current INR result      [facility] nurse, verbalizes understanding and agrees to warfarin dosing plan.    Instructed to call the Anticoagulation Clinic for any changes, questions or concerns. (#[phone])        OBJECTIVE:  INR   Date Value Ref Range Status   2020 2.1 (A) 0.90 - 1.10 Final             Anticoagulation Summary  As of 4/3/2020    INR goal:   2.0-3.0   TTR:   42.9 % (2 wk)   INR used for dosin.1 (4/3/2020)   Warfarin maintenance plan:   2 mg (1 mg x 2) every Mon, Wed, Fri; 3 mg (1 mg x 3) all other days   Full warfarin instructions:   2 mg every Mon, Wed, Fri; 3 mg all other days   Weekly warfarin total:   18 mg   Plan last modified:   Michelle Alvarez RN (3/31/2020)   Next INR check:   4/10/2020   Priority:   High   Target end date:   3/11/2021    Indications    Paroxysmal atrial fibrillation (H) [I48.0]  Atrial fibrillation  unspecified type (H) [I48.91]  History of DVT (deep vein thrombosis) [Z86.718]  CVA (cerebral vascular accident) (H) (Resolved)  [I63.9]             Anticoagulation Episode Summary     INR check location:       Preferred lab:       Send INR reminders to:   GUSTAVO FARAH    Comments:   History of CVA 2017 s/p endarterectomy in 2017 Previous clipping of aneurysm. [facility]. Lives at [facility]. Facility lab days are MON/THURS. Fax orders to [phone] Phone: [phone]      Anticoagulation Care Providers     Provider Role Specialty Phone number    Junie Estrella APRN CNP Responsible Nurse Practitioner - Gerontology [phone]

## 2020-04-10 NOTE — TELEPHONE ENCOUNTER
ANTICOAGULATION MANAGEMENT     Patient Name:  Jazmin Clifton  Date:  4/10/2020    ASSESSMENT /SUBJECTIVE:    Today's INR result of 2.0 is therapeutic. Goal INR of 2.0-3.0      Warfarin dose taken: Warfarin taken as previously instructed    Diet: No new diet changes affecting INR    Medication changes/ interactions: No new medications/supplements affecting INR    Previous INR: Therapeutic     S/S of bleeding or thromboembolism: No    New injury or illness: No    Upcoming surgery, procedure or cardioversion: No    Additional findings: None      PLAN:    Spoke with [facility], regarding INR result and instructed:     Warfarin Dosing Instructions: Continue your current warfarin dose 2 mg on MWF and 3 mg all other days    Instructed patient to follow up no later than: 2 weeks  Orders given to  Homecare nurse/facility to recheck    Education provided: None required      [facility] care, verbalizes understanding and agrees to warfarin dosing plan.    Instructed to call the Anticoagulation Clinic for any changes, questions or concerns. (#[phone])        OBJECTIVE:  INR   Date Value Ref Range Status   04/10/2020 2.0 (A) 0.90 - 1.10 Final             Anticoagulation Summary  As of 4/10/2020    INR goal:   2.0-3.0   TTR:   61.9 % (3 wk)   INR used for dosin.0 (4/10/2020)   Warfarin maintenance plan:   2 mg (1 mg x 2) every Mon, Wed, Fri; 3 mg (1 mg x 3) all other days   Full warfarin instructions:   2 mg every Mon, Wed, Fri; 3 mg all other days   Weekly warfarin total:   18 mg   Plan last modified:   Michelle Alvarez RN (3/31/2020)   Next INR check:   2020   Priority:   High   Target end date:   3/11/2021    Indications    Paroxysmal atrial fibrillation (H) [I48.0]  Atrial fibrillation  unspecified type (H) [I48.91]  History of DVT (deep vein thrombosis) [Z86.718]  CVA (cerebral vascular accident) (H) (Resolved) [I63.9]             Anticoagulation Episode Summary     INR check location:        Preferred lab:       Send INR reminders to:   GUSTAVO FARAH    Comments:   History of CVA 2017 s/p endarterectomy in 2017 Previous clipping of aneurysm. [facility]. Lives at [facility]. Facility lab days are MON/THURS. Fax orders to [phone] Phone: [phone]      Anticoagulation Care Providers     Provider Role Specialty Phone number    Junie Estrella APRN CNP Responsible Nurse Practitioner - Gerontology [phone]

## 2020-04-16 NOTE — PROGRESS NOTES
CC received request for HHA services from .  CC did support this thru the end of June due to member increased falls and multiple ED and hospital stays.  CC did ask if Denice to reach out to CC if this was going to extend beyond this so it could be reviewed.     To date CC still had not received call back from member son to discuss placement options.  CC did talk with staff at [facility] and member is doing fine at this time with services in place so CC will resume conversation when [facility] or son reach out   Mirian Weldon MA [facility] Coordinator   [phone]

## 2020-04-22 NOTE — TELEPHONE ENCOUNTER
ANTICOAGULATION MANAGEMENT     Patient Name:  Jazmin Clifton  Date:  2020    ASSESSMENT /SUBJECTIVE:    Today's INR result of 2.2 is therapeutic. Goal INR of 2.0-3.0      Warfarin dose taken: Warfarin taken as previously instructed    Diet: No new diet changes affecting INR    Medication changes/ interactions: No new medications/supplements affecting INR    Previous INR: Therapeutic     S/S of bleeding or thromboembolism: No    New injury or illness: No    Upcoming surgery, procedure or cardioversion: No    Additional findings: None      PLAN:    Spoke with [facility] nurse,  regarding INR result and instructed:     Warfarin Dosing Instructions: Continue your current warfarin dose 2 mg on MWF and 3 mg all other days    Instructed patient to follow up no later than: 3 weeks  Orders given to  Homecare nurse/facility to recheck    Education provided: None required      [facility] nurse, verbalizes understanding and agrees to warfarin dosing plan.    Instructed to call the Anticoagulation Clinic for any changes, questions or concerns. (#[phone])        OBJECTIVE:  INR   Date Value Ref Range Status   2020 2.2 (A) 0.90 - 1.10 Final             Anticoagulation Summary  As of 2020    INR goal:   2.0-3.0   TTR:   75.8 % (1.1 mo)   INR used for dosin.2 (2020)   Warfarin maintenance plan:   2 mg (1 mg x 2) every Mon, Wed, Fri; 3 mg (1 mg x 3) all other days   Full warfarin instructions:   2 mg every Mon, Wed, Fri; 3 mg all other days   Weekly warfarin total:   18 mg   Plan last modified:   Michelle Alvarez RN (3/31/2020)   Next INR check:   2020   Priority:   High   Target end date:   3/11/2021    Indications    Paroxysmal atrial fibrillation (H) [I48.0]  Atrial fibrillation  unspecified type (H) [I48.91]  History of DVT (deep vein thrombosis) [Z86.718]  CVA (cerebral vascular accident) (H) (Resolved) [I63.9]             Anticoagulation Episode Summary     INR check  location:       Preferred lab:       Send INR reminders to:   GUSTAVO FARAH    Comments:   History of CVA 2017 s/p endarterectomy in 2017 Previous clipping of aneurysm. [facility]. Lives at [facility]. Facility lab days are MON/THURS. Fax orders to [phone] Phone: [phone]      Anticoagulation Care Providers     Provider Role Specialty Phone number    Junie Estrella APRN CNP Responsible Nurse Practitioner - Gerontology [phone]

## 2020-04-23 NOTE — TELEPHONE ENCOUNTER
VO given to adalid Jiang nurse, to recheck INR on Thursday 5/14/20 instead of 5/13/20 as lab comes out to facility on thursdays and home care will be discharging before then.    Patient stable with no changes at this time.    Michelle Alvarez RN

## 2020-05-05 NOTE — PROGRESS NOTES
[facility] Care Coordination Contact    2nd Attempt: Signed Letter not received from Krishnamurthy on [place], resent per process.   Nicole Carrizales  Case Management Specialist  [facility]  [phone]

## 2020-05-14 NOTE — TELEPHONE ENCOUNTER
Call from Deidre at [facility] on [address] stating that the INR due today will need to be postponed until tomorrow.  Requesting warfarin dosing for tonight.      Will continue current dosing at 3 mg tonight (5/14/20) and recheck INR tomorrow.      Order faxed to Deidre at [phone] Joyce Wise RN May 14, 2020 10:14 AM

## 2020-05-15 NOTE — TELEPHONE ENCOUNTER
ANTICOAGULATION MANAGEMENT     Patient Name:  Jazmin Clifton  Date:  5/15/2020    ASSESSMENT /SUBJECTIVE:    Today's INR result of 2.18 is therapeutic. Goal INR of 2.0-3.0      Warfarin dose taken: Warfarin taken as previously instructed    Diet: No new diet changes affecting INR    Medication changes/ interactions: No new medications/supplements affecting INR    Previous INR: Therapeutic     S/S of bleeding or thromboembolism: No    New injury or illness: No    Upcoming surgery, procedure or cardioversion: No    Additional findings: None      PLAN:    Spoke with [facility] nurse, regarding INR result and instructed:     Warfarin Dosing Instructions: Continue your current warfarin dose 2 mg on MWF and 3 mg all other days    Instructed patient to follow up no later than: 4 weeks  Orders given to  Homecare nurse/facility to recheck    Education provided: None required      [facility] nurse, verbalizes understanding and agrees to warfarin dosing plan.    Instructed to call the Anticoagulation Clinic for any changes, questions or concerns. (#[phone])        OBJECTIVE:  INR   Date Value Ref Range Status   05/15/2020 2.18 (H) 0.86 - 1.14 Final             Anticoagulation Summary  As of 5/15/2020    INR goal:   2.0-3.0   TTR:   85.9 % (1.9 mo)   INR used for dosin.18 (5/15/2020)   Warfarin maintenance plan:   2 mg (1 mg x 2) every Mon, Wed, Fri; 3 mg (1 mg x 3) all other days   Full warfarin instructions:   2 mg every Mon, Wed, Fri; 3 mg all other days   Weekly warfarin total:   18 mg   Plan last modified:   Michelle Alvarez RN (3/31/2020)   Next INR check:   2020   Priority:   High   Target end date:   3/11/2021    Indications    Paroxysmal atrial fibrillation (H) [I48.0]  Atrial fibrillation  unspecified type (H) [I48.91]  History of DVT (deep vein thrombosis) [Z86.718]  CVA (cerebral vascular accident) (H) (Resolved) [I63.9]             Anticoagulation Episode Summary     INR check location:        Preferred lab:       Send INR reminders to:   GUSTAVO FARAH    Comments:   History of CVA 2017 s/p endarterectomy in 2017 Previous clipping of aneurysm. [facility]. Lives at [facility]. Facility lab days are MON/THURS. Fax orders to [phone] Phone: [phone]      Anticoagulation Care Providers     Provider Role Specialty Phone number    Junie Estrella APRN CNP Responsible Nurse Practitioner - Gerontology [phone]
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
## 2020-06-02 PROBLEM — I48.0 PAROXYSMAL ATRIAL FIBRILLATION (H): Status: ACTIVE | Noted: 2020-01-01

## 2020-06-02 NOTE — PLAN OF CARE
Based on System inpatient rehab protocol for Persons Under Investigation of COVID-19 (PUI) patient does not meet criteria for therapy at this time. Will re-assess tomorrow if imminent discharge recommendations are needed.       Margaret Banegas  PT, DPT       6/2/2020   [address] [address]  [email or web address]  Voicemail: [phone]

## 2020-06-02 NOTE — H&P
"[facility]    History and Physical - Hospitalist Service       Date of Admission:  6/1/2020    Assessment & Plan   Jazmin Clifton is a 87 year old female admitted on 6/1/2020. She has history of COPD/restrictive lung disease, paroxysmal atrial fibrillation, diastolic congestive heart failure, hypertension, coronary artery disease, hyperlipidemia, CVA, GERD and anemia. She presents following a fall.     Fall, mechanical  Lost balance when getting off toilet, hitting head on counter and landing on floor. No loss of consciousness. Reported head/neck pain, central back/chest pain and shortness of breath following. Imaging in ED included: CT head, CT cervical spine, CT lumbar spine, CT pelvis, XR tibia/fibula all of which were negative for acute process. CT chest showed no signs of trauma, though did have ground glass opacities as noted below. Appears to be mechanical fall, no clear signs of injury on work up.   - PT/OT consult, care transitions consult  - pain: home pain regimen (acetaminphen, Oxycontin, prn hydromorphone)    Hypoxemia, Ground Glass Opacities on CT chest  COVID Rule Out  Reported shortness of breath and central back pain following the fall. One loose stool yesterday with nausea. No cough, sore throat, change in taste/smell or other symptoms consistent with COVID. Afebrile, WBC normal. 87% on room air in ED, of note had recently received narcotic medications. Improved on 2 LPM oxygen. CT chest shows \"three ground-glass opacities, two of which are in the right upper lobe and one of which is in the superior segment of the left lower lobe\" which can be consistent with infectious etiology such as COVID 19 vs low grade adenocarcinoma vs other, radiologist recommends follow up in 3 months. No clear indication for antibiotics at this time with normal WBC and afebrile. No current symptoms on admission, attempting to wean oxygen.   - COVID pending, precautions in place, not low " "suspicion at this point, would consider re-testing if negative given CT findings and hypoxia  - will need follow up CT in 3 months as outpatient  - add on CRP, ferritin    [place] on CKD  Creatinine 1.47, GFR 32. Baseline variable over past year, Creatinine 0.97-1.4, GFR 32-53. Unclear etiology though renal function has been quite variable recently.  - IVF: gentle IVF resuscitation while ruling out COVID, 500 ml bolus over 5 hours  - resume lisinopril and furosemide tomorrow   - follow BMP  - encourage oral intake    Thyroid Nodule on CT, incidental finding  CT cervical spine incidentally found \"Heterogeneous attenuation of the thyroid gland, with a grossly stable hyperdense nodule along the posterior margin of the left thyroid lobe that measures 2.6 x 2.5 x 3.0 cm in size. Given size, consider further evaluation with nonemergent thyroid ultrasound.\"  - defer to outpatient provider for further evaluation    Paroxysmal Atrial Fibrillation  Recently diagnosed during last admission and started on warfarin. INR 1.7.   - continue home warfarin, pharmacy to dose    COPD, restrictive lung disease  No PFTs on file, both restrictive and obstructive lung disease noted in history.  - continue home Breo Ellipta     Chronic Diastolic Congestive Heart Failure   Current weight of 181 lb, stable. Last echocardiogram on 2/2019 demonstrated an EF of 55-60% and normal LV, normal RV, mild left atrial dilation, mild atrial root dilation and moderate diastolic dysfunction.  - resume home furosemide tomorrow  - Follow daily weights  - Judicious use of IVF     Coronary Artery Disease  Noted in history though unclear diagnosis, no prior cardiac testing on file to verify or notes from cardiology.  - continue home statin     Chronic pain, right hip, low back, left shoulder  History of pelvic fracture  Follows with pain medicine last seen 11/2019, managed on acetaminophen, Cymbalta, gabapentin, oxycodone, and tizanidine. Using low dose of " prednisone for shoulder pain.  - continue home Oxycontin, dilaudid prn, prednisone 5 mg daily, gabapentin, duloxetine     Hypertension  Chronic. Blood pressures reviewed, stable.   - continue home lisinopril, furosemide, nifedipine     Hyperlipidemia  Chronic.  - continue home atorvastatin     History of CVA/TIA  S/P Carotid Endarterectomy 2017  Brain aneurysm s/p clipping  History of CVA 2017 s/p endarterectomy in 2017. Previous clipping of aneurysm.  - continue home statin     Gastroesophageal reflux disease  Chronic.  - continue home omeprazole      Chronic Normocytic Anemia  Hgb 12.3. Baseline recently ~8-10. Stable. Recently re-started iron supplementation.  - continue home iron  - AM CBC      Diet: Advance Diet as Tolerated: Clear Liquid Diet    DVT Prophylaxis: Warfarin  Maldonado Catheter: not present  Code Status: DNR/DNI    Disposition Plan   Expected discharge: 2 - 3 days, recommended to prior living arrangement once respiratory status improving, adequate pain management/ tolerating PO medications, safe disposition plan and no further barriers to discharge identified.  Entered: Alexa Mclaughlin PA-C 06/02/2020, 7:16 AM     The patient's care was discussed with the Attending Physician, Dr. Kristen Lopez and Patient.    Alexa Mclaughlin PA-C  [facility]  ______________________________________________________________________    Chief Complaint   Fall    History is obtained from the patient and electronic health record    Visit/Communication Style   VIRTUAL (VIDEO) communication was used to evaluate Jazmin due to the COVID pandemic.    Jazmin consented to the use of video communication: yes  Video START time: 10:02 AM, 6/2/2020  Video STOP time: 10:23 AM, 6/2/2020   Patient's location: [facility]   Provider's location during the visit: [facility]      History of Present Illness   Jazmin Clifton is a 87 year old female who  "presents following a fall.    She states she was going to the restroom. When she got up, she lost her balance and fell off the toilet sideways. She hit her head on the counter on her way down. She did not lose consciousness. No concerning symptoms preceding the fall. Following the fall she had pain in her head and neck. She noted an aching pain in the center of her back with shortness of breath. She denies fever, chills, cough, chest pain, sore throat, change in taste/smell. She did have some nausea with emesis the other morning with an episode of diarrhea.     This morning she states she feels well. She does not have any pain currently in her head, neck, back, chest or elsewhere. She does not feel short of breath present.     She has otherwise been feeling well recently.     Review of Systems    The 10 point Review of Systems is negative other than noted in the HPI or here.     Past Medical History    I have reviewed this patient's medical history and updated it with pertinent information if needed.   Past Medical History:   Diagnosis Date     ABDOMINAL PAIN GENERALIZED 3/15/2006    1 month of abdominal pain that llqwhich radiates into her back and chest.  Pt was hospitilzed 2x for the pain at [facility] --pt hopsitized for 3 days.  Rcords not ab=vailable.  She was told either \" twisted intestine or blockage of bowel.  Pt feels it is the hiatal hernia.  Pt hospitilized again a second time  A month ago.  Ct scans x 2 showed \" nothing.\"  Pt had a barium and xrays.  Pt sa     Abdominal pain, generalized 3/15/2006    1 month of abdominal pain that llqwhich radiates into her back and chest.  Pt was hospitilzed 2x for the pain at [facility] --pt hopsitized for 3 days.  Rcords not ab=vailable.  She was told either \" twisted intestine or blockage of bowel.  Pt feels it is the hiatal hernia.  Pt hospitilized again a second time  A month ago.  Ct scans x 2 showed \" nothing.\"  Pt had a barium and xrays.  Pt sa "     Atherosclerosis of renal artery (H)     Left renal artery stenosis     BENIGN HYPERTENSION 5/1/2006 5/1/2006   Increase catapres to 0.3 mg and decrease clonidine to 0.1 mg daily.  Recheck bp in 1 month.      Benign neoplasm of scalp and skin of neck     Seborrheic keratosis     Cerebral aneurysm, nonruptured     Cerebral Aneurysm     Congestive heart failure (H)      Depressive disorder, not elsewhere classified     Depression     Esophageal reflux     Gastroesophageal Reflux Disease     Female stress incontinence      Gastrointestinal malfunction arising from mental factors     Dyspepsia     Generalized osteoarthrosis, unspecified site     DJD-chronic back pain     Herpes zoster dermatitis of eyelid      Insomnia, unspecified      Lumbago     Chronic back pain     Other chest pain     Atypical Chest Pain     Other specified cardiac dysrhythmias(427.89)     Bradycardia, improved on lower dose beta blockers      Rectocele     Grade 3     Unspecified disorder of kidney and ureter     Renal insufficiency     Unspecified essential hypertension        Past Surgical History   I have reviewed this patient's surgical history and updated it with pertinent information if needed.  Past Surgical History:   Procedure Laterality Date     CHOLECYSTECTOMY, LAPOROSCOPIC  1997    Cholecystectomy, Laparoscopic     CYSTOSCOPY, BIOPSY URETHRA N/A 6/10/2019    Procedure: Cystoscopy With Biopsy, Removal Of Urethral Lesion;  Surgeon: Yesy Fong MD;  Location: [facility] OR     ENDARTERECTOMY CAROTID Right 8/31/2017    Procedure: ENDARTERECTOMY CAROTID;  RIGHT CAROTID ENDARTERECTOMY WITH EEG;  Surgeon: Hilario Parry MD;  Location: [facility] OR     HYSTERECTOMY, RAYMOND  1982    oophorectomy,RAYMOND     SURGICAL HISTORY OF -       Laminectomy x 3     SURGICAL HISTORY OF -       MCA Aneurysm repair     SURGICAL HISTORY OF -   1996    Bladder suspension (Bladder Repair x2)     SURGICAL HISTORY OF -       Bilateral Hand Surgeries for  Arthritis x2     SURGICAL HISTORY OF -       Repair of a Cerebral Aneurysm     URETHROPLASTY N/A 6/10/2019    Procedure: Urethral Reconstruction;  Surgeon: Yesy Fong MD;  Location: [facility] OR     Social History   I have reviewed this patient's social history and updated it with pertinent information if needed.  She lives in [facility] in their assisted living facility.   Social History     Tobacco Use     Smoking status: Former Smoker     Years: 50.00     Last attempt to quit: 1992     Years since quittin.4     Smokeless tobacco: Never Used   Substance Use Topics     Alcohol use: Not Currently     Comment: wine occas.     Drug use: No     Family History   I have reviewed this patient's family history and updated it with pertinent information if needed.   Family History   Problem Relation Age of Onset     Cancer Mother         stomache     Cerebrovascular Disease Father      Heart Disease Father      Cancer Brother         lymphoma     Eye Disorder Son      Asthma Son      Diabetes Son      Gastrointestinal Disease Son         no control over bladder or bowels     C.A.D. No family hx of      Hypertension No family hx of      Breast Cancer No family hx of      Cancer - colorectal No family hx of      Prostate Cancer No family hx of      Prior to Admission Medications   Prior to Admission Medications   Prescriptions Last Dose Informant Patient Reported? Taking?   ACETAMINOPHEN EXTRA STRENGTH 500 MG tablet  Nursing Home No No   Sig: TAKE TWO TABLETS (1000MG) BY MOUTH THREE TIMES DAILY   Patient taking differently: Take 1,000 mg by mouth 3 times daily    BREO ELLIPTA 100-25 MCG/INH inhaler  Nursing Home No No   Sig: INHALE 1 PUFF BY MOUTH ONCE DAILY   Patient taking differently: Inhale 1 puff into the lungs daily    Blood Glucose Monitoring Suppl CORY  Nursing Home Yes No   Si times daily Call nurse for further directions if blood glucose is less than 80 or greater than 250   DOK PLUS 50-8.6 MG tablet   Nursing Home No No   Sig: TAKE 1 TABLET BY MOUTH TWICE DAILY   Patient taking differently: Take 1 tablet by mouth 2 times daily    DULoxetine (CYMBALTA) 30 MG capsule  Nursing Home No No   Sig: TAKE 1 CAPSULE BY MOUTH AT BEDTIME   Patient taking differently: Take 30 mg by mouth daily    DULoxetine (CYMBALTA) 60 MG capsule  Nursing Home No No   Sig: TAKE 1 CAPSULE BY MOUTH EVERY MORNING   Patient taking differently: Take 60 mg by mouth daily    Emollient (MOISTURIZING LOTION EX)  Nursing Home Yes No   Sig: Externally apply topically 2 times daily Bilateral lower extremeties   HYDROmorphone (DILAUDID) 2 MG tablet   No No   Sig: TAKE 1 TABLET BY MOUTH TWICE DAILY AS NEEDED FOR SEVERE PAIN   Lidocaine (LIDOCARE) 4 % Patch  Nursing Home No No   Sig: Place 1 patch onto the skin daily To desired area (shoulder or hip). On for 12hrs, off for 12hrs   Menthol, Topical Analgesic, (BIOFREEZE) 4 % GEL  Nursing Home Yes No   Sig: Externally apply topically 4 times daily as needed To left shoulder and right hip   NIFEdipine ER (ADALAT CC) 60 MG 24 hr tablet  Nursing Home Yes No   Sig: Take 60 mg by mouth daily    OXYCONTIN 10 MG 12 hr tablet   No No   Sig: TAKE ONE TABLET BY MOUTH EVERY 12 HOURS DO NOT CRUSH   VITAMIN D3 25 MCG (1000 UT) tablet  Nursing Home No No   Sig: TAKE 1 TABLET BY MOUTH ONCE DAILY   Patient taking differently: Take 25 mcg by mouth daily    atorvastatin (LIPITOR) 40 MG tablet   No No   Sig: TAKE 1 TABLET BY MOUTH ONCE DAILY   azithromycin (ZITHROMAX) 250 MG tablet   No No   Sig: Take 1 tablet (250 mg) by mouth daily for 2 days   benzocaine (ORAJEL/ANBESOL) 10 % gel  Nursing Home Yes No   Sig: Take by mouth 4 times daily as needed for moderate pain   bisacodyl (DULCOLAX) 10 MG suppository  Nursing Home Yes No   Sig: Place 10 mg rectally daily as needed for constipation   calcium carbonate (TUMS) 500 MG chewable tablet  Nursing Home Yes No   Sig: Take 1 chew tab by mouth 3 times daily as needed   ferrous  sulfate (FEROSUL) 325 (65 Fe) MG tablet   No No   Sig: Take 1 tablet (325 mg) by mouth daily   furosemide (LASIX) 20 MG tablet  Nursing Home No No   Sig: TAKE 1 TABLET BY MOUTH ONCE DAILY   gabapentin (NEURONTIN) 100 MG capsule   Yes No   Sig: Take 100 mg by mouth daily   hypromellose-dextran (GENTEAL TEARS) 0.1-0.3 % ophthalmic solution  Nursing Home Yes No   Sig: Place 1 drop into both eyes every 6 hours as needed for dry eyes   levalbuterol (XOPENEX) 1.25 MG/3ML neb solution  Nursing Home Yes No   Sig: Take 1 ampule by nebulization every 4 hours as needed for shortness of breath / dyspnea or wheezing And every 4 hours PRN   lisinopril (PRINIVIL/ZESTRIL) 20 MG tablet  Nursing Home No No   Sig: Take 1 tablet (20 mg) by mouth daily   nystatin (MYCOSTATIN) 230070 UNIT/GM external powder  Nursing Home No No   Sig: Apply topically 3 times daily   omeprazole (PRILOSEC) 40 MG DR capsule   No No   Sig: TAKE 1 CAPSULE BY MOUTH ONCE DAILY   ondansetron (ZOFRAN) 4 MG tablet  Nursing Home Yes No   Sig: Take 4 mg by mouth every 6 hours as needed for nausea   predniSONE (DELTASONE) 20 MG tablet   No No   Sig: Take 2 tablets (40 mg) by mouth daily for 2 days   Patient not taking: Reported on 3/13/2020   predniSONE (DELTASONE) 5 MG tablet  Nursing Home No No   Sig: TAKE 1 TABLET BY MOUTH ONCE DAILY   tiZANidine (ZANAFLEX) 2 MG tablet  Nursing Home Yes No   Sig: Take 2 mg by mouth every 8 hours as needed    warfarin ANTICOAGULANT (COUMADIN) 1 MG tablet   No No   Sig: Take 2 tabs (2 mg) every MWF, and take 3 tabs (3 mg) all other days of the week or as directed by the Anticoagulation Clinic      Facility-Administered Medications: None     Allergies   Allergies   Allergen Reactions     Accupril Other (See Comments)     Dizziness       Ace Inhibitors Other (See Comments)     Accupril - dizziness     Augmentin Unknown     Blood-Group Specific Substance Other (See Comments)     Patient has a Non-specific antibody. Blood Product orders  may be delayed.  Draw one red top and two purple top tubes for ALL Type and Screen/ Type and Crossmatch orders.      Levofloxacin Other (See Comments) and Muscle Pain (Myalgia)     Pt prescribed ciprofloxacin in Jan 2018 (no rxn documented)         Morphine Other (See Comments)     hallucinations     Nitrofurantoin Nausea and Vomiting     Norvasc [Amlodipine Besylate] Swelling     Leg swelling       Quinapril Other (See Comments)     Hypertension. 3.29.18 - Takes and tolerates lisinopril         Physical Exam   Vital Signs: Temp: 96.3  F (35.7  C) Temp src: Oral BP: (!) 181/81 Pulse: 86 Heart Rate: 86 Resp: 18 SpO2: 94 % O2 Device: Nasal cannula Oxygen Delivery: 2.5 LPM  Weight: 181 lbs 14.07 oz    Physical exam not personally performed by this provider in order to limit staff exposure and conserve PPE while COVID 19 test pending. Below exam based on my tele-visit with patient. Please refer to exam performed by ED provider for further details on physical exam.    Constitutional: laying down comfortably in bed, awake, alert, cooperative, no apparent distress, and appears stated age  Respiratory: nasal cannula in place. No respiratory distress. Speaking in full sentences.  Neurologic: Awake, alert.  Neuropsychiatric: calm, pleasant, cooperative, appropriate thought process/content, short term memory intact    Data   Data reviewed today: I reviewed all medications, new labs and imaging results over the last 24 hours. I personally reviewed the EKG tracing showing NSR without acute ST or T wave changes when compared to prior, additional ECG obtained in ED showing atrial fibrillation, rate controlled no acute ST or T wave changes.    Recent Labs   Lab 06/01/20  2349 06/01/20  2346   WBC  --  7.8   HGB  --  12.3   MCV  --  93   PLT  --  236   INR 1.70*  --    NA  --  144   POTASSIUM  --  4.2   CHLORIDE  --  110*   CO2  --  27   BUN  --  32*   CR  --  1.47*   ANIONGAP  --  7   BLAIR  --  9.8   GLC  --  114*   ALBUMIN  --   3.3*   PROTTOTAL  --  6.5*   BILITOTAL  --  0.3   ALKPHOS  --  80   ALT  --  18   AST  --  15     Recent Results (from the past 24 hour(s))   Cervical spine CT w/o contrast    Narrative    EXAM: CT CERVICAL SPINE W/O CONTRAST  LOCATION: [facility]  DATE/TIME: 6/2/2020 12:32 AM    INDICATION: Neck pain after fall  COMPARISON: 11/02/2019 cervical spine CT  TECHNIQUE: Routine without IV contrast. Multiplanar reformats. Dose reduction techniques were used.    FINDINGS:  VERTEBRA: Stable alignment with right apex curvature of the cervical spine and straightening of the cervical spine lordosis. There is degenerative anterolisthesis of C3 on C4 that measures approximately 3 mm. The bones are diffusely demineralized. No   evidence of an acute displaced fracture.     CANAL/FORAMINA: There are multilevel degenerative changes of the cervical spine, with at least mild canal stenosis at C3-C4, C4-C5, and C5-C6. Multilevel bilateral neural foraminal narrowing.    PARASPINAL: No acute extraspinal abnormality. Stable heterogeneous appearance of the thyroid gland with a hyperdense nodule along the posterior margin of the left thyroid lobe, measuring 2.6 x 2.5 x 3.0 cm. Consider further evaluation with nonemergent   thyroid ultrasound.      Impression    IMPRESSION:  1.  No evidence of an acute displaced fracture of the cervical spine.  2.  Multilevel degenerative changes, resulting in at least mild canal stenosis from C3-C4 through C5-C6. Multilevel bilateral high-grade neural foraminal narrowing.  3.  Heterogeneous attenuation of the thyroid gland, with a grossly stable hyperdense nodule along the posterior margin of the left thyroid lobe that measures 2.6 x 2.5 x 3.0 cm in size. Given size, consider further evaluation with nonemergent thyroid   ultrasound.   Chest CT w/o contrast    Narrative    EXAM: CT CHEST W/O CONTRAST  LOCATION: [facility]  DATE/TIME: 6/2/2020 12:32 AM    INDICATION: Chest pain and  shortness of breath. Evaluate for rib fracture or other acute process. Recent fall.    COMPARISON: None.    TECHNIQUE: CT chest without IV contrast. Multiplanar reformats were obtained. Dose reduction techniques were used.    CONTRAST: None.    FINDINGS:   LUNGS AND PLEURA: Three focal areas of ground-glass opacity, one of which is in the superior lateral aspect of the left lower lobe (series 6, image 98) and the other ground-glass opacities are present in the posterior lateral aspect of the right upper   lobe and each measures 7 mm in size (series 6, image 58). No consolidation. Minimal centrilobular and paraseptal emphysema. Atelectasis in the lung bases. Evidence for old granulomatous disease. No pleural fluid.    MEDIASTINUM/AXILLAE: Minimal cardiac enlargement. No pericardial fluid. No lymphadenopathy. Calcified mediastinal lymph nodes. Atherosclerotic vascular calcification. Marked coronary artery calcification. Normal caliber esophagus.    UPPER ABDOMEN: Cholecystectomy. Exophytic bilateral high-density lesions involve the kidneys, most compatible with multiple hemorrhagic cysts. No hydronephrosis. Splenic granulomas.    MUSCULOSKELETAL: Degenerative hypertrophic changes within the spine. Marked compression changes involving the upper lumbar spine. No evidence for acute displaced rib fracture.      Impression    IMPRESSION:   1.  No evidence for consolidation or acute focal alveolar infiltrate. There are three ground-glass opacities, two of which are in the right upper lobe and one of which is in the superior segment of the left lower lobe. Ground-glass opacities such as   these can be seen with COVID-19 pneumonia, though are nonspecific and can occur with a variety of infectious and noninfectious processes. Low grade adenocarcinoma can even have this appearance. Therefore, recommend CT follow-up within three months to   assess for resolution. Recommend correlation with patient's laboratory and exposure  history.    2.  Atelectasis involving the lung bases. No pleural fluid. Minimal centrilobular and paraseptal emphysema.    3.  Atherosclerotic vascular calcification including marked coronary artery calcification.    4.  No definitive evidence for acute displaced fracture. Marked degenerative changes in the spine.     CT Head w/o Contrast    Narrative    EXAM: CT HEAD W/O CONTRAST  LOCATION: [facility]  DATE/TIME: 6/2/2020 12:32 AM    INDICATION: Head injury after fall. Anticoagulated.  COMPARISON: CT head dated 11/02/2019  TECHNIQUE: Routine without IV contrast. Multiplanar reformats. Dose reduction techniques were used.    FINDINGS:  INTRACRANIAL CONTENTS: Redemonstrated postprocedural changes related to a right pterional cranioplasty with aneurysm clips in the right middle cranial fossa. Metallic streak artifact limits evaluation of adjacent structures. No definite acute   intracranial hemorrhage or adverse intracranial mass effect. No CT evidence of acute infarct. Moderate presumed chronic small vessel ischemic changes. Mild generalized volume loss. No hydrocephalus.     VISUALIZED ORBITS/SINUSES/MASTOIDS: Prior bilateral cataract surgery. Visualized portions of the orbits are otherwise unremarkable. No paranasal sinus mucosal disease. No middle ear or mastoid effusion.    BONES/SOFT TISSUES: No acute abnormality.      Impression    IMPRESSION:  1.  No acute intracranial process.  2.  Chronic age-related and postoperative changes as described.   Pelvis XR w/ unilateral hip right    Narrative    EXAM: XR PELVIS AND HIP RIGHT 1 VIEW  LOCATION: [facility]  DATE/TIME: 6/2/2020 12:33 AM    INDICATION: Unwitnessed fall. Evaluate for fracture.  COMPARISON: None.      Impression    IMPRESSION: Allowing for osteopenia there is a very subtle area of lucency along the medial aspect of the left superior pubic ramus suspicious for a nondisplaced fracture. Old healed fracture left inferior pubic  ramus. No definitive evidence for   displaced fracture involving the right or left superior pubic ramus. No evidence for fracture involving the proximal right femur or the left femur adjacent to a left SATHYA. No evidence for dislocation of the SATHYA. Pedicle screw and malinda fixation lower lumbar   spine. Minimal degenerative changes both SI joints. Advanced atherosclerotic vascular calcification.   Lumbar spine CT w/o contrast    Narrative    EXAM: CT LUMBAR SPINE W/O CONTRAST  LOCATION: [facility]  DATE/TIME: 6/2/2020 12:32 AM    INDICATION: Back pain after fall  COMPARISON: CT abdomen and pelvis dated 03/09/2020  TECHNIQUE: Routine without IV contrast. Multiplanar reformats.  Dose reduction techniques were used.     FINDINGS:  VERTEBRA: The inferior aspect of the L5 vertebral body and the upper sacrum are not included in the field-of-view. Redemonstrated postoperative changes at L2-L5 posterior fusion with bilateral pedicle screws at L2, L3, and L5 and on the right at L4.   There is mature osseous fusion across the posterior elements. There are bilateral laminectomy changes and posterior decompression from L3 through L5. The hardware appears grossly intact there is no CT evidence of loosening. Alignment is unchanged, with   grade 1 retrolisthesis of T12 on L1 and L1 on L2, and L2 on L3 and grade 1 anterolisthesis of L4 on L5. The bones are diffusely demineralized. Chronic compression deformities at T11 and T12 are grossly unchanged. No definite acute displaced fracture.     CANAL/FORAMINA: Multilevel degenerative changes of the lumbar spine, without high-grade osseous spinal canal stenosis. There is multilevel bilateral neural foraminal narrowing, with at least moderate narrowing on the left at L4-L5 and mild to moderate   bilateral narrowing at L3-L4 and L2-L3. At least moderate narrowing bilaterally at T12-L1 and L1-L2.    PARASPINAL: No acute extraspinal abnormality. Incompletely evaluated lesions in  the bilateral kidneys with bilateral and renal atrophy. Scattered atherosclerotic calcification.      Impression    IMPRESSION:  1.  Degenerative and postoperative changes of the lumbar spine, without evidence of an acute osseous abnormality. Please note, the sacrum is not entirely included in the field-of-view, and if the patient is experiencing sacral pain consider further   evaluation with CT of the pelvis.   XR Tibia & Fibula Bilateral 2 Views    Narrative    EXAM: BILATERAL TIBIAS AND FIBULAS 8 VIEWS  LOCATION: [facility]  DATE/TIME: 6/2/2020 12:33 AM    INDICATION: Unwitnessed fall. Skin abrasions.    COMPARISON: None.      Impression    IMPRESSION:  1. No visualized acute fracture or malalignment of bilateral tibias or fibulas.  2. No knee joint effusions bilaterally.  3. Diffuse osteopenia.  4. Mild degenerative changes in bilateral knee joints.     CT Pelvis Bone wo Contrast    Narrative    EXAM: CT PELVIS BONE WITHOUT CONTRAST  LOCATION: [facility]  DATE/TIME: 6/2/2020 2:08 AM    INDICATION: Unwitnessed fall. Osteopenia. Possible subtle pubic ramus fracture.    COMPARISON: 11/12/2019.    TECHNIQUE: CT scan of the pelvis was performed without IV contrast. Multiplanar reformats were obtained. Dose reduction techniques were used.    CONTRAST: None.    FINDINGS:  PELVIS ORGANS: The uterus is not visualized, likely representing a prior hysterectomy. Several colonic diverticula are present without evidence of diverticulitis.    MUSCULOSKELETAL: No visualized acute fracture of the pelvic bones or bilateral hips. An old fracture of the left inferior pubic ramus is present. Partial visualization of a left hip arthroplasty. No evidence of hardware failure. Partial visualization of   fusion hardware in the lower lumbar spine. Moderate degenerative changes in the right hip joint.    OTHER: Atherosclerotic calcification in the abdominal aorta. Partial visualization of a large 8 x 6 cm  circumscribed fluid collection in the right inguinal region that was also present on the comparison study. This is nonspecific, but could represent a   seroma or lymphocele. Moderate-sized paraumbilical hernia containing fat.      Impression    IMPRESSION: No acute fracture or malalignment of the pelvis or bilateral hips.

## 2020-06-02 NOTE — ED PROVIDER NOTES
History     Chief Complaint   Patient presents with     Back Pain     fall/ back pain/ SOB     HPI  Jazmin Clifton is a 87 year old female who presents from the [place] [facility] after an unwitnessed fall off the toilet.  She arrived by EMS stating she has chest pain shortness of breath, lower back pain, right leg pain, right groin pain and hip pain.  Patient tells me she had use the bathroom got up and lost her footing and fell forward striking her head.  The fall was not witnessed.  Patient has multiple medical diagnoses including history of renovascular hypertension, restrictive lung disease, stage III chronic kidney disease, history of morbid obesity and chronic diastolic CHF, history of bradycardia, history of congenital cystic kidney disease, impaired ambulation proximal atrial fibrillation is on chronic anticoagulation with warfarin.  On arrival she is reporting low back pain, EMS reports she was found awkwardly positioned with her arm tucked behind her on her left side.  EMS reports she pushed her life alert button.    Allergies:  Allergies   Allergen Reactions     Accupril Other (See Comments)     Dizziness       Ace Inhibitors Other (See Comments)     Accupril - dizziness     Augmentin Unknown     Blood-Group Specific Substance Other (See Comments)     Patient has a Non-specific antibody. Blood Product orders may be delayed.  Draw one red top and two purple top tubes for ALL Type and Screen/ Type and Crossmatch orders.      Levofloxacin Other (See Comments) and Muscle Pain (Myalgia)     Pt prescribed ciprofloxacin in Jan 2018 (no rxn documented)         Morphine Other (See Comments)     hallucinations     Nitrofurantoin Nausea and Vomiting     Norvasc [Amlodipine Besylate] Swelling     Leg swelling       Quinapril Other (See Comments)     Hypertension. 3.29.18 - Takes and tolerates lisinopril           Problem List:    Patient Active Problem List    Diagnosis Date Noted     Paroxysmal atrial fibrillation (H)  2020     Priority: Medium     Atrial fibrillation, unspecified type (H) 2020     Priority: Medium     Vomiting and diarrhea 2020     Priority: Medium     Chronic pain syndrome 2019     Priority: Medium     Lymphedema 2019     Priority: Medium     Pubic ramus fracture (H) 2019     Priority: Medium     Acute dysfunction of right eustachian tube 2019     Priority: Medium     Coronary artery disease of native heart with stable angina pectoris, unspecified vessel or lesion type (H) 2019     Priority: Medium     Sialadenitis 2019     Priority: Medium     Post-operative state 06/10/2019     Priority: Medium     Mass of urethra 2019     Priority: Medium     Gross hematuria 2019     Priority: Medium     Anemia of chronic renal failure, stage 4 (severe) (H) 2019     Priority: Medium     Health Care Home 2019     Priority: Medium     [facility] Care Coordinator  Mirian Weldon MA, LSW  [phone]    [facility] CARE PLAN SUMMARY    Member Name:  Jazmin MARIBETH Clifton    Address:  [address] [address]     Phone: [phone] (home)    :  1932 Date of Assessment: 3-12-20   Health Plan:  [facility]  Health Plan Number: [id number] Medical Assistance Number: [id number]  Financial Worker:  [facility]   Case #:     FVP Care Coordinator:  Mirian Weldon MA, LSW CC Phone:  [phone]  CC Fax:  [phone]   [facility] Enrollment Date: 19 Case Mix:  H  Rate Cell:  E  Waiver Type: EW     Primary Emergency Contact:  Chi Clifton  Address: [address] [place] [address]  Mobile Phone: [phone]  Relation: Son    Secondary Emergency Contact: Lucius Clifton  Address: [address] [place] [address]   Home Phone: [phone]  Work Phone: [phone]  Relation: Son Language:  English  :  No   Health Care Agent/POA:   Advanced Directives/Living Will:     Primary Care  "Clinic/Phone/Fax:  [facility]/(p) [phone], (f) [phone] Primary Dx:  COPD J44.9  Secondary Dx: DDD M51.36  Cognitive Impairment G31.84   Primary Physician:  Junie Estrella   Height:  5' 3\"  Weight:  188 lbs   Specialty Physician:    Audiologist:     Eye Care Provider:   Dental Care Provider:  [facility]   [facility] [phone]   Other:        [facility] CURRENT SERVICES SUMMARY  Equipment owned/DME history:  Member son purchased electric wheelchair for member;    Member has scooter, lift chair, [facility]  - transfer pole 2-20 installed   SERVICE TYPE/PROVIDER NAME/PHONE AUTH DATE FREQUENCY Units OR $ Amt DESCRIPTION   Medical Transportation: [facility] [phone]  Fax:  3-1-20 thru 2-28-21  Review annually/PRN   as needed     Assisted Living: [facility]) [phone] 24 hr [facility]  Fax:  3-1-20 thru 2-28-21  Review annually/PRN daily See RS/AL Tool      Supplies: [facility] [phone]  Fax:  3-1-20 thru 2-28-21  Review annually/PRN Monthly         1 pull up per day   1 liner per day XL pull ups     Regular liners        HHA/RN: [facility] [phone]   3-5-20 thru 6-30-20  Review annually/PRN 2x week 277.68 In addition to staffing at AL due to member increased need since last hospital stay                        Hip pain 10/29/2018     Priority: Medium     Impaired ambulation 10/29/2018     Priority: Medium     Morbid obesity (H) 06/19/2018     Priority: Medium     Multiple closed fractures of metatarsal bone 05/28/2018     Priority: Medium     Right groin mass 05/03/2018     Priority: Medium     Chronic diastolic congestive heart failure (H) 02/02/2018     Priority: Medium     History of stroke 02/02/2018     Priority: Medium     Mild cognitive impairment 09/22/2017     Priority: Medium     S/P carotid endarterectomy 09/06/2017     Priority: Medium     Underwent for " symptomatic right carotid artery stenosis        Carotid stenosis, symptomatic w/o infarct, right 08/31/2017     Priority: Medium     Thyroid nodule 08/23/2017     Priority: Medium     Trochanteric bursitis of right hip 08/23/2017     Priority: Medium     CKD (chronic kidney disease) stage 3, GFR 30-59 ml/min (H) 08/16/2017     Priority: Medium     Transient cerebral ischemia, unspecified type 08/01/2017     Priority: Medium     Chronic obstructive pulmonary disease, unspecified COPD type (H) 07/25/2017     Priority: Medium     Osteoarthritis of right hip, unspecified osteoarthritis type 07/25/2017     Priority: Medium     Retention of urine 04/15/2017     Priority: Medium     History of intracranial aneurysm 04/14/2017     Priority: Medium     Postherpetic neuralgia 11/14/2016     Priority: Medium     Umbilical hernia without obstruction and without gangrene 06/03/2016     Priority: Medium     Spinal stenosis of lumbar region 01/11/2016     Priority: Medium     Spondylolisthesis, lumbar region 01/11/2016     Priority: Medium     BPPV (benign paroxysmal positional vertigo) 11/27/2014     Priority: Medium     Dyspepsia 11/27/2014     Priority: Medium     Female stress incontinence 11/27/2014     Priority: Medium     History of bradycardia 11/27/2014     Priority: Medium     History of DVT (deep vein thrombosis) 11/27/2014     Priority: Medium     History of herpes zoster 11/27/2014     Priority: Medium     Constipation 10/20/2012     Priority: Medium     Rectocele 08/31/2012     Priority: Medium     Hip joint replacement status 04/18/2012     Priority: Medium     Osteoarthritis 04/18/2012     Priority: Medium     DDD (degenerative disc disease), lumbar 04/18/2012     Priority: Medium     Mild major depression (H) 04/18/2012     Priority: Medium     Incontinence of urine 04/18/2012     Priority: Medium     Hyperlipidemia 12/07/2011     Priority: Medium     Osteoporosis 10/25/2011     Priority: Medium     S/P  laminectomy 10/25/2011     Priority: Medium     CARDIOVASCULAR SCREENING; LDL GOAL LESS THAN 100 10/31/2010     Priority: Medium     Normocytic anemia 02/17/2010     Priority: Medium     Restrictive lung disease 09/17/2008     Priority: Medium     PFT 4/2008       Renovascular hypertension 11/09/2006     Priority: Medium     Problem list name updated by automated process. Provider to review       Benign neoplasm of colon 10/04/2006     Priority: Medium     Angiodysplasia - due for repeat 10 years.  (2014)       Congenital cystic kidney disease 09/26/2006     Priority: Medium     10/6/06 Rob Juárez MD - [facility]  [phone], fax [phone] - needs labs faxed monthly  6/12/07 [facility] - Rob Juárez MD   RECOMMENDATIONS:CHRONIC KIDNEY DISEASE -3 Creatinine 1.0 down from 1.4 and 1.8  In the past, recent improvement most likely due to no expossure to NSAIDS in the recent weeks. NO edema. Continue current Therapy.HYPERTENSION: Dynacirc CR 10mg daily initiated today. Will continue adjusting blood pressure medicatins as needed until readings at target.VITAMIN D  DEFICIENCY - Completed therapy, discontinue ergocalciferol.ANEMIA, EPO DEFICIENCY - Hgb 10.2. Not on EPO. Continue observation for now. Recnet Hgb drop due to lumbar laminectomy.  Problem list name updated by automated process. Provider to review       Essential hypertension, benign 05/01/2006     Priority: Medium     Esophageal reflux      Priority: Medium     Gastroesophageal Reflux Disease       Cerebral aneurysm, nonruptured      Priority: Medium     Cerebral Aneurysm - unchanged on rcent MRI.  Seen by neurosurg.  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     Priority: Medium        Past Medical History:    Past Medical History:   Diagnosis  Date     ABDOMINAL PAIN GENERALIZED 3/15/2006     Abdominal pain, generalized 3/15/2006     Atherosclerosis of renal artery (H)      BENIGN HYPERTENSION 5/1/2006     Benign neoplasm of scalp and skin of neck      Cerebral aneurysm, nonruptured      Congestive heart failure (H)      Depressive disorder, not elsewhere classified      Esophageal reflux      Female stress incontinence      Gastrointestinal malfunction arising from mental factors      Generalized osteoarthrosis, unspecified site      Herpes zoster dermatitis of eyelid      Insomnia, unspecified      Lumbago      Other chest pain      Other specified cardiac dysrhythmias(427.89)      Rectocele      Unspecified disorder of kidney and ureter      Unspecified essential hypertension        Past Surgical History:    Past Surgical History:   Procedure Laterality Date     CHOLECYSTECTOMY, LAPOROSCOPIC  1997    Cholecystectomy, Laparoscopic     CYSTOSCOPY, BIOPSY URETHRA N/A 6/10/2019    Procedure: Cystoscopy With Biopsy, Removal Of Urethral Lesion;  Surgeon: Yesy Fong MD;  Location: [facility] OR     ENDARTERECTOMY CAROTID Right 8/31/2017    Procedure: ENDARTERECTOMY CAROTID;  RIGHT CAROTID ENDARTERECTOMY WITH EEG;  Surgeon: Hilario Parry MD;  Location: [facility] OR     HYSTERECTOMY, RAYMOND  1982    oophorectomy,RAYMOND     SURGICAL HISTORY OF -       Laminectomy x 3     SURGICAL HISTORY OF -       MCA Aneurysm repair     SURGICAL HISTORY OF -   1996    Bladder suspension (Bladder Repair x2)     SURGICAL HISTORY OF -       Bilateral Hand Surgeries for Arthritis x2     SURGICAL HISTORY OF -       Repair of a Cerebral Aneurysm     URETHROPLASTY N/A 6/10/2019    Procedure: Urethral Reconstruction;  Surgeon: Yesy Fong MD;  Location: [facility] OR       Family History:    Family History   Problem Relation Age of Onset     Cancer Mother         stomache     Cerebrovascular Disease Father      Heart Disease Father      Cancer Brother         lymphoma     Eye  Disorder Son      Asthma Son      Diabetes Son      Gastrointestinal Disease Son         no control over bladder or bowels     C.A.D. No family hx of      Hypertension No family hx of      Breast Cancer No family hx of      Cancer - colorectal No family hx of      Prostate Cancer No family hx of        Social History:  Marital Status:   [5]  Social History     Tobacco Use     Smoking status: Former Smoker     Years: 50.00     Last attempt to quit: 1992     Years since quittin.4     Smokeless tobacco: Never Used   Substance Use Topics     Alcohol use: Not Currently     Comment: wine occas.     Drug use: No        Medications:    ACETAMINOPHEN EXTRA STRENGTH 500 MG tablet  atorvastatin (LIPITOR) 40 MG tablet  benzocaine (ORAJEL/ANBESOL) 10 % gel  bisacodyl (DULCOLAX) 10 MG suppository  Blood Glucose Monitoring Suppl CORY  BREO ELLIPTA 100-25 MCG/INH inhaler  calcium carbonate (TUMS) 500 MG chewable tablet  DOK PLUS 50-8.6 MG tablet  DULoxetine (CYMBALTA) 30 MG capsule  DULoxetine (CYMBALTA) 60 MG capsule  Emollient (MOISTURIZING LOTION EX)  ferrous sulfate (FEROSUL) 325 (65 Fe) MG tablet  furosemide (LASIX) 20 MG tablet  gabapentin (NEURONTIN) 100 MG capsule  HYDROmorphone (DILAUDID) 2 MG tablet  hypromellose-dextran (GENTEAL TEARS) 0.1-0.3 % ophthalmic solution  levalbuterol (XOPENEX) 1.25 MG/3ML neb solution  Lidocaine (LIDOCARE) 4 % Patch  lisinopril (PRINIVIL/ZESTRIL) 20 MG tablet  Menthol, Topical Analgesic, (BIOFREEZE) 4 % GEL  NIFEdipine ER (ADALAT CC) 60 MG 24 hr tablet  nystatin (MYCOSTATIN) 411746 UNIT/GM external powder  omeprazole (PRILOSEC) 40 MG DR capsule  ondansetron (ZOFRAN) 4 MG tablet  OXYCONTIN 10 MG 12 hr tablet  predniSONE (DELTASONE) 5 MG tablet  tiZANidine (ZANAFLEX) 2 MG tablet  VITAMIN D3 25 MCG (1000 UT) tablet  warfarin ANTICOAGULANT (COUMADIN) 1 MG tablet          Review of Systems   Constitutional:        Unwitnessed fall off the toilet   HENT: Negative.    Eyes: Negative.  "   Respiratory: Negative.    Cardiovascular: Negative.    Endocrine: Negative.    Genitourinary: Negative.    Musculoskeletal: Positive for back pain (post fall).   Skin: Positive for wound (bruise about lower leg, left shoulder,).   Allergic/Immunologic: Negative.    Neurological: Negative.    Hematological: Bruises/bleeds easily.   Psychiatric/Behavioral: Negative.        Physical Exam   BP: 137/77  Pulse: 72  Heart Rate: 72  Temp: 97.9  F (36.6  C)  Resp: 20  Height: 160 cm (5' 3\")  Weight: 85.7 kg (189 lb)  SpO2: 95 %      Physical Exam  Constitutional:       General: She is in acute distress.      Appearance: She is diaphoretic.   HENT:      Head: Normocephalic and atraumatic.   Eyes:      Extraocular Movements: Extraocular movements intact.      Pupils: Pupils are equal, round, and reactive to light.   Neck:      Musculoskeletal: Normal range of motion and neck supple.   Cardiovascular:      Rate and Rhythm: Normal rate. Rhythm irregular.      Pulses: Normal pulses.      Heart sounds: Murmur present.   Abdominal:      Hernia: No hernia is present.   Musculoskeletal:         General: Tenderness present.      Right lower leg: Edema present.      Left lower leg: Edema present.   Skin:     Capillary Refill: Capillary refill takes less than 2 seconds.      Coloration: Skin is pale.   Neurological:      General: No focal deficit present.      Mental Status: She is alert and oriented to person, place, and time. Mental status is at baseline.   Psychiatric:         Mood and Affect: Mood normal.         Behavior: Behavior normal.         ED Course        Procedures               EKG Interpretation:      Interpreted by Venu Paredes MD  Time reviewed: 2345  Symptoms at time of EKG: Chest tightness, chest pain   Rhythm: atrial fibrillation - controlled  Rate: Normal  Axis: Normal  Ectopy: none  Conduction: normal  ST Segments/ T Waves: Non-specific ST-T wave changes  Q Waves: nonspecific  Comparison to prior: " Unchanged from 3/9/2020    Clinical Impression: no acute changes.            Critical Care time:  none               ED medications:  Medications   ondansetron (ZOFRAN) injection 4 mg (has no administration in time range)   fentaNYL (PF) (SUBLIMAZE) injection 25 mcg (25 mcg Intravenous Given 6/2/20 0144)     ED Vitals:  Vitals:    06/02/20 0114 06/02/20 0130 06/02/20 0200 06/02/20 0230   BP:  (!) 164/91 133/79 128/76   Pulse:  70 56 79   Resp:       Temp:       SpO2: 97% 97% 93% 97%   Weight:       Height:           ED labs and imaging:  Results for orders placed or performed during the hospital encounter of 06/01/20   CT Head w/o Contrast     Status: None    Narrative    EXAM: CT HEAD W/O CONTRAST  LOCATION: [facility]  DATE/TIME: 6/2/2020 12:32 AM    INDICATION: Head injury after fall. Anticoagulated.  COMPARISON: CT head dated 11/02/2019  TECHNIQUE: Routine without IV contrast. Multiplanar reformats. Dose reduction techniques were used.    FINDINGS:  INTRACRANIAL CONTENTS: Redemonstrated postprocedural changes related to a right pterional cranioplasty with aneurysm clips in the right middle cranial fossa. Metallic streak artifact limits evaluation of adjacent structures. No definite acute   intracranial hemorrhage or adverse intracranial mass effect. No CT evidence of acute infarct. Moderate presumed chronic small vessel ischemic changes. Mild generalized volume loss. No hydrocephalus.     VISUALIZED ORBITS/SINUSES/MASTOIDS: Prior bilateral cataract surgery. Visualized portions of the orbits are otherwise unremarkable. No paranasal sinus mucosal disease. No middle ear or mastoid effusion.    BONES/SOFT TISSUES: No acute abnormality.      Impression    IMPRESSION:  1.  No acute intracranial process.  2.  Chronic age-related and postoperative changes as described.   Cervical spine CT w/o contrast     Status: None    Narrative    EXAM: CT CERVICAL SPINE W/O CONTRAST  LOCATION: [facility]  Services  DATE/TIME: 6/2/2020 12:32 AM    INDICATION: Neck pain after fall  COMPARISON: 11/02/2019 cervical spine CT  TECHNIQUE: Routine without IV contrast. Multiplanar reformats. Dose reduction techniques were used.    FINDINGS:  VERTEBRA: Stable alignment with right apex curvature of the cervical spine and straightening of the cervical spine lordosis. There is degenerative anterolisthesis of C3 on C4 that measures approximately 3 mm. The bones are diffusely demineralized. No   evidence of an acute displaced fracture.     CANAL/FORAMINA: There are multilevel degenerative changes of the cervical spine, with at least mild canal stenosis at C3-C4, C4-C5, and C5-C6. Multilevel bilateral neural foraminal narrowing.    PARASPINAL: No acute extraspinal abnormality. Stable heterogeneous appearance of the thyroid gland with a hyperdense nodule along the posterior margin of the left thyroid lobe, measuring 2.6 x 2.5 x 3.0 cm. Consider further evaluation with nonemergent   thyroid ultrasound.      Impression    IMPRESSION:  1.  No evidence of an acute displaced fracture of the cervical spine.  2.  Multilevel degenerative changes, resulting in at least mild canal stenosis from C3-C4 through C5-C6. Multilevel bilateral high-grade neural foraminal narrowing.  3.  Heterogeneous attenuation of the thyroid gland, with a grossly stable hyperdense nodule along the posterior margin of the left thyroid lobe that measures 2.6 x 2.5 x 3.0 cm in size. Given size, consider further evaluation with nonemergent thyroid   ultrasound.   Pelvis XR w/ unilateral hip right     Status: None    Narrative    EXAM: XR PELVIS AND HIP RIGHT 1 VIEW  LOCATION: [facility]  DATE/TIME: 6/2/2020 12:33 AM    INDICATION: Unwitnessed fall. Evaluate for fracture.  COMPARISON: None.      Impression    IMPRESSION: Allowing for osteopenia there is a very subtle area of lucency along the medial aspect of the left superior pubic ramus suspicious for a  nondisplaced fracture. Old healed fracture left inferior pubic ramus. No definitive evidence for   displaced fracture involving the right or left superior pubic ramus. No evidence for fracture involving the proximal right femur or the left femur adjacent to a left SATHYA. No evidence for dislocation of the SATHYA. Pedicle screw and malinda fixation lower lumbar   spine. Minimal degenerative changes both SI joints. Advanced atherosclerotic vascular calcification.   Lumbar spine CT w/o contrast     Status: None    Narrative    EXAM: CT LUMBAR SPINE W/O CONTRAST  LOCATION: [facility]  DATE/TIME: 6/2/2020 12:32 AM    INDICATION: Back pain after fall  COMPARISON: CT abdomen and pelvis dated 03/09/2020  TECHNIQUE: Routine without IV contrast. Multiplanar reformats.  Dose reduction techniques were used.     FINDINGS:  VERTEBRA: The inferior aspect of the L5 vertebral body and the upper sacrum are not included in the field-of-view. Redemonstrated postoperative changes at L2-L5 posterior fusion with bilateral pedicle screws at L2, L3, and L5 and on the right at L4.   There is mature osseous fusion across the posterior elements. There are bilateral laminectomy changes and posterior decompression from L3 through L5. The hardware appears grossly intact there is no CT evidence of loosening. Alignment is unchanged, with   grade 1 retrolisthesis of T12 on L1 and L1 on L2, and L2 on L3 and grade 1 anterolisthesis of L4 on L5. The bones are diffusely demineralized. Chronic compression deformities at T11 and T12 are grossly unchanged. No definite acute displaced fracture.     CANAL/FORAMINA: Multilevel degenerative changes of the lumbar spine, without high-grade osseous spinal canal stenosis. There is multilevel bilateral neural foraminal narrowing, with at least moderate narrowing on the left at L4-L5 and mild to moderate   bilateral narrowing at L3-L4 and L2-L3. At least moderate narrowing bilaterally at T12-L1 and  L1-L2.    PARASPINAL: No acute extraspinal abnormality. Incompletely evaluated lesions in the bilateral kidneys with bilateral and renal atrophy. Scattered atherosclerotic calcification.      Impression    IMPRESSION:  1.  Degenerative and postoperative changes of the lumbar spine, without evidence of an acute osseous abnormality. Please note, the sacrum is not entirely included in the field-of-view, and if the patient is experiencing sacral pain consider further   evaluation with CT of the pelvis.   Chest CT w/o contrast     Status: None    Narrative    EXAM: CT CHEST W/O CONTRAST  LOCATION: [facility]  DATE/TIME: 6/2/2020 12:32 AM    INDICATION: Chest pain and shortness of breath. Evaluate for rib fracture or other acute process. Recent fall.    COMPARISON: None.    TECHNIQUE: CT chest without IV contrast. Multiplanar reformats were obtained. Dose reduction techniques were used.    CONTRAST: None.    FINDINGS:   LUNGS AND PLEURA: Three focal areas of ground-glass opacity, one of which is in the superior lateral aspect of the left lower lobe (series 6, image 98) and the other ground-glass opacities are present in the posterior lateral aspect of the right upper   lobe and each measures 7 mm in size (series 6, image 58). No consolidation. Minimal centrilobular and paraseptal emphysema. Atelectasis in the lung bases. Evidence for old granulomatous disease. No pleural fluid.    MEDIASTINUM/AXILLAE: Minimal cardiac enlargement. No pericardial fluid. No lymphadenopathy. Calcified mediastinal lymph nodes. Atherosclerotic vascular calcification. Marked coronary artery calcification. Normal caliber esophagus.    UPPER ABDOMEN: Cholecystectomy. Exophytic bilateral high-density lesions involve the kidneys, most compatible with multiple hemorrhagic cysts. No hydronephrosis. Splenic granulomas.    MUSCULOSKELETAL: Degenerative hypertrophic changes within the spine. Marked compression changes involving the upper  lumbar spine. No evidence for acute displaced rib fracture.      Impression    IMPRESSION:   1.  No evidence for consolidation or acute focal alveolar infiltrate. There are three ground-glass opacities, two of which are in the right upper lobe and one of which is in the superior segment of the left lower lobe. Ground-glass opacities such as   these can be seen with COVID-19 pneumonia, though are nonspecific and can occur with a variety of infectious and noninfectious processes. Low grade adenocarcinoma can even have this appearance. Therefore, recommend CT follow-up within three months to   assess for resolution. Recommend correlation with patient's laboratory and exposure history.    2.  Atelectasis involving the lung bases. No pleural fluid. Minimal centrilobular and paraseptal emphysema.    3.  Atherosclerotic vascular calcification including marked coronary artery calcification.    4.  No definitive evidence for acute displaced fracture. Marked degenerative changes in the spine.     XR Tibia & Fibula Bilateral 2 Views     Status: None    Narrative    EXAM: BILATERAL TIBIAS AND FIBULAS 8 VIEWS  LOCATION: [facility]  DATE/TIME: 6/2/2020 12:33 AM    INDICATION: Unwitnessed fall. Skin abrasions.    COMPARISON: None.      Impression    IMPRESSION:  1. No visualized acute fracture or malalignment of bilateral tibias or fibulas.  2. No knee joint effusions bilaterally.  3. Diffuse osteopenia.  4. Mild degenerative changes in bilateral knee joints.     CT Pelvis Bone wo Contrast     Status: None    Narrative    EXAM: CT PELVIS BONE WITHOUT CONTRAST  LOCATION: [facility]  DATE/TIME: 6/2/2020 2:08 AM    INDICATION: Unwitnessed fall. Osteopenia. Possible subtle pubic ramus fracture.    COMPARISON: 11/12/2019.    TECHNIQUE: CT scan of the pelvis was performed without IV contrast. Multiplanar reformats were obtained. Dose reduction techniques were used.    CONTRAST: None.    FINDINGS:  PELVIS  ORGANS: The uterus is not visualized, likely representing a prior hysterectomy. Several colonic diverticula are present without evidence of diverticulitis.    MUSCULOSKELETAL: No visualized acute fracture of the pelvic bones or bilateral hips. An old fracture of the left inferior pubic ramus is present. Partial visualization of a left hip arthroplasty. No evidence of hardware failure. Partial visualization of   fusion hardware in the lower lumbar spine. Moderate degenerative changes in the right hip joint.    OTHER: Atherosclerotic calcification in the abdominal aorta. Partial visualization of a large 8 x 6 cm circumscribed fluid collection in the right inguinal region that was also present on the comparison study. This is nonspecific, but could represent a   seroma or lymphocele. Moderate-sized paraumbilical hernia containing fat.      Impression    IMPRESSION: No acute fracture or malalignment of the pelvis or bilateral hips.     CBC with platelets differential     Status: Abnormal   Result Value Ref Range    WBC 7.8 4.0 - 11.0 10e9/L    RBC Count 4.35 3.8 - 5.2 10e12/L    Hemoglobin 12.3 11.7 - 15.7 g/dL    Hematocrit 40.5 35.0 - 47.0 %    MCV 93 78 - 100 fl    MCH 28.3 26.5 - 33.0 pg    MCHC 30.4 (L) 31.5 - 36.5 g/dL    RDW 15.9 (H) 10.0 - 15.0 %    Platelet Count 236 150 - 450 10e9/L    Diff Method Automated Method     % Neutrophils 71.5 %    % Lymphocytes 18.7 %    % Monocytes 8.0 %    % Eosinophils 0.8 %    % Basophils 0.4 %    % Immature Granulocytes 0.6 %    Nucleated RBCs 0 0 /100    Absolute Neutrophil 5.6 1.6 - 8.3 10e9/L    Absolute Lymphocytes 1.5 0.8 - 5.3 10e9/L    Absolute Monocytes 0.6 0.0 - 1.3 10e9/L    Absolute Eosinophils 0.1 0.0 - 0.7 10e9/L    Absolute Basophils 0.0 0.0 - 0.2 10e9/L    Abs Immature Granulocytes 0.1 0 - 0.4 10e9/L    Absolute Nucleated RBC 0.0    Comprehensive metabolic panel     Status: Abnormal   Result Value Ref Range    Sodium 144 133 - 144 mmol/L    Potassium 4.2 3.4 -  5.3 mmol/L    Chloride 110 (H) 94 - 109 mmol/L    Carbon Dioxide 27 20 - 32 mmol/L    Anion Gap 7 3 - 14 mmol/L    Glucose 114 (H) 70 - 99 mg/dL    Urea Nitrogen 32 (H) 7 - 30 mg/dL    Creatinine 1.47 (H) 0.52 - 1.04 mg/dL    GFR Estimate 32 (L) >60 mL/min/[1.73_m2]    GFR Estimate If Black 37 (L) >60 mL/min/[1.73_m2]    Calcium 9.8 8.5 - 10.1 mg/dL    Bilirubin Total 0.3 0.2 - 1.3 mg/dL    Albumin 3.3 (L) 3.4 - 5.0 g/dL    Protein Total 6.5 (L) 6.8 - 8.8 g/dL    Alkaline Phosphatase 80 40 - 150 U/L    ALT 18 0 - 50 U/L    AST 15 0 - 45 U/L   INR     Status: Abnormal   Result Value Ref Range    INR 1.70 (H) 0.86 - 1.14   ABO/Rh type and screen     Status: None   Result Value Ref Range    ABO O     RH(D) Pos     Antibody Screen Neg     Test Valid Only At [facility]        Specimen Expires 06/04/2020          Assessments & Plan (with Medical Decision Making)   Clinical impression: 87-year-old female with multiple medical diagnoses including a history of restrictive lung disease/COPD, CHF, chronic pain, stage III chronic kidney disease, history of brain aneurysm status post clipping, DVT, paroxysmal atrial fibrillation on chronic anticoagulation who presented by EMS from [facility] after an unwitnessed  fall while getting up off the toilet prior to arrival.  No discrete traumatic injuries noted on imaging during her assessment during her ED course however there is concern the patient is at risk for falls currently in assisted living at the [facility] and she will benefit from higher level of care.  Patient is admitted to medicine to help with care transitions.  Patient was seen upon arrival as a trauma evaluation.  Patient was noted to complain of chest pain and shortness of breath on transport.  On arrival she reported hitting her head, she reports neck pain, lower back pain, right groin pain, right lower leg pain.  Patient appeared to have bruising about the left shoulder, she was awake alert oriented x3.  GCS  was 15.  She had a protuberant abdomen with symmetric pitting edema about the lower leg with a skin abrasion over the left lower leg.  No limb length discrepancy.  Pain was modestly controlled during her ED course with some hypoxia post pain medication requiring oxygen supplementation.      ED course and Plan:  Reviewed the medical record.  Recent hospitalization for hypoxemia secondary to COPD in March 2020.  INR was 2.18 on May 15, 2020.  With history of chronic anticoagulation and unwitnessed fall blood work was obtained including INR and type and screen.  Imaging obtained to exclude posttraumatic injuries CT of the head, CT of the cervical spine, CT of the chest to exclude rib fracture, pulmonary contusion,.  X-ray of the pelvis and right hip and bilateral tib/fib. EKG obtained on arrival due to report of chest tightness and shortness of breath post trauma.  Wavy baseline, rate controlled atrial fibrillation without acute ischemia appreciated from comparison EKG from March 9, 2020.    Work-up today revealed a creatinine 1.47.  Creatinine was 1.30 on March 11, 2020.  INR- 1.70.  X imaging of bilateral tib-fib was negative for acute bony process. XR of the pelvis suggested a possible nondisplaced left superior pubic ramus fracture allowing for osteopenia.  Head CT and CT cervical spine was negative for acute process. With the possibility of a subtle pubic rami fracture on XR, CT of the pelvis was obtained.  CT of the chest was obtained to exclude lung contusion, rib fractures and hemopneumothorax.  The interpreting radiologist noted some groundglass opacities which could be seen in patients with COVID-19 pneumonia though nonspecific.  A low-grade adenocarcinoma could have this appearance as well.  A follow-up CT in 3 months was advised.  Patient presented post trauma and clinical suspicion that she has COVID-19 pneumonia is low. Patient has underlying history of COPD and restrictive lung disease. CT lumbar spine  was negative for acute process. CT pelvis was negative for acute process. See additional details in interpreting radiology report above.   Patient did have some low saturation requiring oxygen support after IV pain medication given for comfort and her report of pain.  Sats down to 84% on room air, 88 to 92% on 2 L nasal cannula.    I spoke with Dr PERLA Orellana at 2.55am- admitting hospitalist who agreed to assume care upon handoff for admission after reviewing rationale for admission concern about fall risk, need for higher level of care, will benefit from  to help with discharge planning patient is currently in assisted living.  Patient did have some hypoxia during her ED course which is likely multifactorial in the context of receiving IV fentanyl for pain control.  COVID-19 test is ordered because patient is admitted to the hospital and will likely need to be ruled out if she is to transition to a higher level of care with ground glass opacities noted on CT chest (non-contrast) as interpreted by the radiologist.. No traumatic injuries identified during ED course hence no trauma consultation prior to admission and patient admitted for fall risk with chronic anticoagulation use.      Disclaimer: This note consists of symbols derived from keyboarding, dictation and/or voice recognition software. As a result, there may be errors in the script that have gone undetected. Please consider this when interpreting information found in this chart.  I have reviewed the nursing notes.    I have reviewed the findings, diagnosis, plan and need for follow up with the patient.       New Prescriptions    No medications on file       Final diagnoses:   Fall, initial encounter - unwitnessed, getting off the toilet   History of anticoagulant use - on Wafarin   History of atrial fibrillation   Leg swelling   Contusion of left leg, initial encounter   Abnormal CT of the chest - obtained for trauma, ground glass opacity noted        6/1/2020   [facility] EMERGENCY DEPARTMENT     Venu Paredes MD  06/02/20 0310

## 2020-06-02 NOTE — PLAN OF CARE
"[place] [facility] [place]    Patient admitted to room 2211 at approximately 0330 via cart from emergency room. Patient was accompanied by transport tech.     Verbal SBAR report received from Richmond ELKINS prior to patient arrival.     Patient trasferred to bed via air juanis. Patient alert and oriented X 3. Pain is not well controlled.  Medication(s) being used: narcotic analgesics including fentanyl. 0-10 Pain Scale: 5. Admission vital signs: Blood pressure (!) 181/81, pulse 86, temperature 96.3  F (35.7  C), temperature source Oral, resp. rate 18, height 1.6 m (5' 3\"), weight 82.5 kg (181 lb 14.1 oz), SpO2 94 %. Patient was oriented to plan of care, call light, bed controls, tv, telephone, bathroom, and visiting hours. Patient rates pain 4/10.  Gave tylenol 650 mg at 0609.  Patient states she needs applesauce to take her pills.  Did not seem to have difficulty swallowing plain water and stated she always drinks water without difficulty.  Patient is incontinent and purwick was applied.      Risk Assessment    The following safety risks were identified during admission: fall. Yellow risk band applied: YES.     Skin Initial Assessment    This writer admitted this patient and completed a full skin assessment and Will score in the Adult PCS flowsheet. Appropriate interventions initiated as needed.  Patient has multiple bruises all over body.  Fell on left shoulder leaving a 3 cm bruise, 3cm bruise on back, bruised left shin and calf, Bruised left hand. Left elbow has a skin tear 1 cm, left shin has skin tear 1.5 cm with a 1cm area under skin filled with blood and bruising all around it.  Patient pointed out a fluid filled punch under skin near right hip.  Groin folds are all red with right groin fold also having excoriation and weeping.  Cleansed all groin folds with skin cleanser, dried and applied barrier cream for temporary relief until powder order could be obtained.  Patient uses powder at home.  Skin tears were cleansed " with normal saline and covered with gauze.      Secondary skin check completed by Ana Cristina ELKINS .    Will Risk Assessment  Sensory Perception: 4-->no impairment  Moisture: 3-->occasionally moist  Activity: 2-->chairfast  Mobility: 2-->very limited  Nutrition: 3-->adequate  Friction and Shear: 2-->potential problem  Will Score: 16  Bed Support Surface: Atmos Air mattress  Will Intervention(s) Implemented: draw sheets, fecal incontinence , heels suspended, HOB elevated 30 degrees or less, incontinence care    Education    Patient has a [place] to Observation order: No  Observation education completed and documented: N/A      Kathya Mays RN

## 2020-06-02 NOTE — CONSULTS
Care Transition Initial Assessment - RN      Met with: Patient and Family.    DATA  Principal Problem:    Fall  Active Problems:    Essential hypertension, benign    Restrictive lung disease    Chronic obstructive pulmonary disease, unspecified COPD type (H)    CKD (chronic kidney disease) stage 3, GFR 30-59 ml/min (H)    Mild cognitive impairment    Morbid obesity (H)    Chronic diastolic congestive heart failure (H)    History of DVT (deep vein thrombosis)    History of stroke    Hyperlipidemia    Normocytic anemia    Coronary artery disease of native heart with stable angina pectoris, unspecified vessel or lesion type (H)    Chronic pain syndrome    Paroxysmal atrial fibrillation (H)       Cognitive Status: awake, alert and oriented.  [facility] Care Clinic [facility]: [facility]  Primary Care MD Name: trish  Contact information and PCP information verified: Yes  Lives With: other (see comments)([facility])      Quality of Family Relationships: supportive, involved, helpful  Description of Support System: Supportive, Involved   Who is your support system?: Children   Support Assessment: Adequate family and caregiver support   Insurance concerns: No Insurance issues identified  Admit date and time:  6/1/2020 11:35 PM      This writer spoke with pt via phone, introduced self and role. Patient currently lives at [facility] (phone: [phone] Fax: [phone]).  Spoke with SEVERO Duncan @ [facility].  Patient has current help/services with transfers, toileting, dressing, medication administration, escorts, safety checks.  At baseline, staff help with all transfers and ambulation, assist of 1.  Patient does not have current home care services.  Patient is current with [facility] Care Coordinator Mirian Weldon MA, [phone].  See below for his care plan summary.      Discussed discharge planning and medicare guidelines in regards to home care and SNF benefits.  Patient's goal is to return to [facility] at  "discharge.  Therapy evaluations pending.       Left message with patient's son, Ed with tentative discharge plans.     PLAN    Return to [facility], therapy evaluations pending    [facility] CARE PLAN SUMMARY     Member Name:  Jazmin Clifton     Address:  [address] [place]  [address]  [place] [address]       Phone: [phone] (home)    :  1932 Date of Assessment: 3-12-20   Health Plan:  [facility] [facility]  Health Plan Number: [id number] Medical Assistance Number: [id number]  Financial Worker:  [facility]   Case #:     [facility] Care Coordinator:  Mirian Weldon MA, LSW CC Phone:  [phone]  CC Fax:  [phone]   FVP Enrollment Date: 19 Case Mix:  H  Rate Cell:  E  Waiver Type: EW     Primary Emergency Contact:  Chi Clifton  Address: [address] [place] [address]  Mobile Phone: [phone]  Relation: Son     Secondary Emergency Contact: Lucius Clifton  Address: [address] [place] [address]   Home Phone: [phone]  Work Phone: [phone]  Relation: Son Language:  English  :  No   Health Care Agent/POA:   Advanced Directives/Living Will:     Primary Care Clinic/Phone/Fax:  [facility]/(p) [phone], (f) [phone] Primary Dx:  COPD J44.9  Secondary Dx: DDD M51.36  Cognitive Impairment G31.84   Primary Physician:  Junie Estrella    Height:  5' 3\"  Weight:  188 lbs   Specialty Physician:    Audiologist:     Eye Care Provider:   Dental Care Provider:  [facility]   [facility] [phone]   Other:          [facility] CURRENT SERVICES SUMMARY  Equipment owned/DME history:  Member son purchased electric wheelchair for member;    Member has scooter, lift chair, [employer] [facility]  - transfer pole 2- installed   SERVICE TYPE/PROVIDER NAME/PHONE AUTH DATE FREQUENCY Units OR $ Amt DESCRIPTION   Medical Transportation: [facility] [phone]  Fax:  3-1-20 thru 21  Review annually/PRN   as " needed       Assisted Living: [facility] ([facility]isted Living) [phone] 24 hr Customized Living  Fax:  3-1-20 thru 2-28-21  Review annually/PRN daily See RS/AL Tool        Supplies: [employer] [facility] [phone]  Fax:  3-1-20 thru 2-28-21  Review annually/PRN Monthly             1 pull up per day   1 liner per day XL pull ups      Regular liners          HHA/RN: [facility] [phone]    3-5-20 thru 6-30-20  Review annually/PRN 2x week 277.68 In addition to staffing at AL due to member increased need since last hospital stay           Janet Mrach, BSN RN  Inpatient Care Coordinator  [facility] [phone]  [facility] [phone]

## 2020-06-02 NOTE — PHARMACY-ANTICOAGULATION SERVICE
Clinical Pharmacy - Warfarin Dosing Consult     Pharmacy has been consulted to manage this patient s warfarin therapy.  Indication: Atrial Fibrillation  Therapy Goal: INR 2-3  OP Anticoag Clinic: [facility]ag [facility]  Warfarin Prior to Admission: Yes  Warfarin PTA Regimen: 2 mg mwf, 3 mg ROW  Significant drug interactions: None  Recent documented change in oral intake/nutrition: No  Dose Comments: 3 mg for INR = 1.7 from midnight last night.    INR   Date Value Ref Range Status   06/01/2020 1.70 (H) 0.86 - 1.14 Final   05/15/2020 2.18 (H) 0.86 - 1.14 Final       Recommend warfarin 3 mg today.  Pharmacy will monitor Jazmin Clifton daily and order warfarin doses to achieve specified goal.      Please contact pharmacy as soon as possible if the warfarin needs to be held for a procedure or if the warfarin goals change.

## 2020-06-02 NOTE — PLAN OF CARE
OT: Based on System inpatient rehab protocol for Persons Under Investigation of COVID-19 (PUI) patient does not meet criteria for therapy at this time. Will re-assess tomorrow if imminent discharge recommendations are needed.

## 2020-06-02 NOTE — PLAN OF CARE
DATE:  6/2/2020   TIME OF RECEIPT FROM LAB:  0329  LAB TEST:  COVID 19  LAB VALUE:  NEGATIVE  RESULTS GIVEN WITH READ-BACK TO (PROVIDER):  Kristen Lopez MD  TIME LAB VALUE REPORTED TO PROVIDER:   1428

## 2020-06-02 NOTE — PLAN OF CARE
Patient is alert and oriented.  Vitals are stable, oxygen was removed and O2 sat is low 90's on room air.  Patient does report some shortness of breath.  Lungs are clear.  Patient having quite a bit of pain all over, does have chronic pain but now worse after fall.  Scheduled oxycontin and tylenol given along with as needed dilaudid dose, patient seems a little more comfortable now.  Has been incontinent of urine.  Up to the chair for dinner this evening with use of ceiling lift.  Continued special precautions for COVID rule out, first swab negative but possibly plan to test again per provider.

## 2020-06-02 NOTE — ED NOTES
Pt states that she fell at the nursing home hitting her left shoulder and complaining of mid back pain. She does have an abrasion to her left shoulder. She denies hitting her head. She is alert and oriented. She states that she has been falling lately. She is not able to articulate why. She appears SOB on arrival. She has CHF and is on lasix. She states that she is feeling kayode after resting after Triage. She was given Fentanyl for her pain and  Was placed on O2 per NC for drop in O2 sats. She responded well.

## 2020-06-03 NOTE — PLAN OF CARE
Patient incontinent of urine. Up in chair for meals. Alert and oriented. Sating in low 90's on room air-spot checking. Patient complains of back pain in bed and while in chair. MD placed order for dilaudid more frequently. Asked this writer to assist in calling son. Covid test will be repeated tomorrow morning.

## 2020-06-03 NOTE — PROGRESS NOTES
Reason for Follow up: discharge planning    Anticipated discharge needs: Patient not ready for discharge.  MD indicated second COVID test will be completed prior to discharge.  Therapy evaluations pending.  Spoke with patient via phone.  Discussed concern about potential weakness due to extended hospitalization.  Patient's goal is to return to her snf if able, however she is agreeable to TCU if needed.  Pt/family was provided with the Medicare Compare list for SNF.  Discussed associated Medicare star ratings to assist with choice for referrals/discharge planning Yes    Education was given to pt/family that star ratings are updated/maintained by Medicare and can be reviewed by visiting www.medicare.gov Yes    Patient was provided with Medicare certified nursing home list. [facility] (Phone: [phone] Fax: [phone]).  Spoke with [facility].  Discussed pending therapy evaluations and potential need for TCU.  [facility] staff and CTS to work on appropriate disposition as it gets closer.  [facility] would be able to accept if need arises.      Next steps: CTS to follow    Discharge Planner   Discharge Plans in progress: snf vs [facility]  Barriers to discharge plan: medical stability  Follow up plan: CTS to follow       Entered by: Janet March 06/03/2020 3:17 PM       SOLE Clements RN  Inpatient RN care coordinator  [facility] [phone]  [facility] [phone]

## 2020-06-03 NOTE — PROGRESS NOTES
6-3-20   CC received message from Dipti discharge CC at [facility]. She said that member had negative COVID test and they will do one more before appropriate for discontinue.  Member will be there until Friday or Sat and then look to discontinue back to the AL.   Mirian Weldon MA [facility] Care Coordinator   [phone] - work cell phone   [phone] - work fax          6-2-20   CC was notified that member was admitted.  CC reached out to discharge office at [facility] to let them know that CC was involved.  CC also reached out to RN staff at AL. Joe THOMAS, RN director said they were willing to take member back if she was appropriate for discharge from hospital at that time.     CC will follow up as needed on members discharge as needed.   Mirian Weldon MA [facility] Care Coordinator   [phone] - work cell phone   [phone] - work fax

## 2020-06-03 NOTE — PLAN OF CARE
Based on System inpatient rehab protocol for Persons Under Investigation of COVID-19 (PUI) patient does not meet criteria for therapy at this time. Will re-assess tomorrow if imminent discharge recommendations are needed.       Margaret Banegas  PT, DPT       6/3/2020   [address] [address]  [email or web address]  Voicemail: [phone]

## 2020-06-03 NOTE — PROGRESS NOTES
"CLINICAL NUTRITION SERVICES  -  ASSESSMENT NOTE      RECOMMENDATIONS FOR MD/PROVIDER TO ORDER:   None   Recommendations Ordered by Registered Dietitian (RD):   Regular diet as ordered, modified to soft/easier to chew foods  Food preferences as above  Small portions at meals   Future/Additional Recommendations:   Continue to monitor and encourage oral intake   Malnutrition: Patient does not meet two of the above criteria necessary for diagnosing malnutrition        REASON FOR ASSESSMENT  Jazmin Clifton is a 87 year old female seen by Registered Dietitian for Admission Nutrition Risk Screen for reduced oral intake over the last month      NUTRITION HISTORY  Information obtained from EMR:  -admitted for fall at [facility] - lives at [facility]  -[facility]: COPD/restrictive lung disease, paroxysmal atrial fibrillation, diastolic congestive heart failure, hypertension, coronary artery disease, hyperlipidemia, CVA, GERD, CKD stage 3 and anemia  -work-up showed no clear signs of injury. Chest CT showed three ground-glass opacities, two of which are in the right upper lobe and one of which is in the superior segment of the left lower lobe\" which can be consistent with infectious etiology such as COVID 19 vs low grade adenocarcinoma vs other. Pt reported SOB after fall, so ruling-out for COVID-19. First test is negative 6/2/20.    Information obtained from pt via telephone:  -don't like skim milk or OJ - prefer cranberry juice.  -has been waiting for teeth to get fitted since Christmas 2019 - COVID-19 pandemic has delayed this process.   -[facility] sends softer foods  -B: dried cereal or oatmeal, milk (2%) and coffee  -L: \"bigger\" - did not provide specifics  -D: \"less\" - did not provide specifics  -snacks: corn puffs, crackers, \"something like that.\"  -always gets small orders (chronic) - not a big eater. Declines any significant changes in intake.   -every 2-3 mornings \"vomit up heavy clear phlegm and I don't know why.\" When asked if pt is " "trying to clear her throat, she said no. Diarrhea once in awhile. Has not gone to the doctor for diarrhea.      CURRENT NUTRITION ORDERS  Diet Order:     Orders Placed This Encounter      Advance Diet as Tolerated: Regular Diet Adult      Current Intake/Tolerance:  -did not eat lunch 6/2, but ate 50% breakfast and 75% dinner  -ate 75% breakfast today  -denied any snacks. Would like smaller portions.  -doesn't have her dentures with her - would like softer foods      NUTRITION FOCUSED PHYSICAL ASSESSMENT FOR DIAGNOSING MALNUTRITION)  No:  Pt is PUI for COVID-19, therefore RD will not enter room to preserve PPE and minimize exposure.             Obtained from Chart/Interdisciplinary Team:  Edema: 3+ (moderate) in left ankle and foot; 2+ (mild) in right ankle and foot per flowsheet entry at 0100 today     ANTHROPOMETRICS  Height: 5' 3\"  Weight: 186 lbs 4.62 oz  Body mass index is 33 kg/m .  Weight Status:  Obesity Grade I BMI 30-34.9  IBW: 115 lbs  % IBW: 162%  Weight History:   Wt Readings from Last 10 Encounters:   06/03/20 84.5 kg (186 lb 4.6 oz)   03/12/20 83.8 kg (184 lb 12.8 oz)   03/11/20 84.2 kg (185 lb 10 oz)   03/02/20 85.5 kg (188 lb 6.4 oz)   01/07/20 85.3 kg (188 lb)   12/26/19 83.9 kg (184 lb 15.5 oz)   12/10/19 87.9 kg (193 lb 12.8 oz)   11/27/19 84.8 kg (187 lb)   11/23/19 85.3 kg (188 lb)   11/21/19 84.8 kg (187 lb)   -no wt loss noted - appears wt ranges from 180-189 lbs since at least November 2019. Unsure of wt accuracy on 12/10/19 given surrounding wt values.  -pt reports losing 15 lbs in the last couple of months. Now 180 lbs - was close to 200 lbs before losing it. Pt contributes this to her facility struggling to find something she can eat with poor dentition.    Vitals:    06/01/20 2339 06/02/20 0350 06/03/20 0537   Weight: 85.7 kg (189 lb) 82.5 kg (181 lb 14.1 oz) 84.5 kg (186 lb 4.6 oz)   -suspect true admit wt is 181 lbs  -net I/O since admit so far today per 24 hrs: +1338 mL "     LABS  Creatinine 1.11 (H) - [place] on CKD. Noted baseline has been variable over the past year 0.97-1.4.  GFR 44 (L) - [place] on CKD. Noted baseline has been variable over the past year 32-53.    MEDICATIONS  Ferrous sulfate  Lasix  Lisinopril  Omeprazole  Prednisone      ASSESSED NUTRITION NEEDS PER APPROVED PRACTICE GUIDELINES:    Dosing Weight 60 kg (adjusted wt)  Estimated Energy Needs: 5543-0158 kcals (25-30 Kcal/Kg)  Justification: maintenance  Estimated Protein Needs: 48-60 grams protein (0.8-1 g pro/Kg)  Justification: appropriate for age with respect to CKD  Estimated Fluid Needs: (1 mL/Kcal)  Justification: maintenance    MALNUTRITION:  % Weight Loss:  Weight loss does not meet criteria for malnutrition - reported by pt. Wt is stable per EMR.  % Intake:  No decreased intake noted  Subcutaneous Fat Loss: Did not assess  Muscle Loss:  Did not assess  Fluid Retention:  3+ (moderate) in left ankle and foot; 2+ (mild) in right ankle and foot    Malnutrition Diagnosis: Patient does not meet two of the above criteria necessary for diagnosing malnutrition      NUTRITION DIAGNOSIS:  Predicted inadequate nutrient intake (calories) related to poor dentition and pt not having her dentures at the hospital, therefore pt has potential for decline in intake.      NUTRITION INTERVENTIONS  Recommendations / Nutrition Prescription  Regular diet as ordered, modified to soft/easier to chew foods  Food preferences as above  Small portions at meals  Continue to monitor and encourage oral intake      Implementation  Nutrition education: Provided education on the following handouts: Diarrhea Nutrition Therapy  Composition of Meals and Snacks: see above      Nutrition Goals  Pt to consume >/= 75% of meals TID      MONITORING AND EVALUATION:  Progress towards goals will be monitored and evaluated per protocol and Practice Guidelines              Yazmin Phillips RDN, LD  Clinical Dietitian  [phone]

## 2020-06-03 NOTE — PROGRESS NOTES
"[facility] Internal Medicine Progress Note     Date of Service (when I saw the patient): 06/03/2020    REASON FOR ADMISSION / INTERVAL HISTORY:  Jazmin Clifton is a 87 year old female admitted on 6/1/2020. She has history of COPD/restrictive lung disease, paroxysmal atrial fibrillation, diastolic congestive heart failure, hypertension, coronary artery disease, hyperlipidemia, CVA, GERD and anemia. She presents following a fall.     ASSESSMENT/PLAN:   Fall, mechanical  Lost balance when getting off toilet, hitting head on counter and landing on floor. No loss of consciousness. Reported head/neck pain, central back/chest pain and shortness of breath following. Imaging in ED included: CT head, CT cervical spine, CT lumbar spine, CT pelvis, XR tibia/fibula all of which were negative for acute process. CT chest showed no signs of trauma, though did have ground glass opacities as noted below. Appears to be mechanical fall, no clear signs of injury on work up.   - PT/OT consult, care transitions consult  - pain: home pain regimen (acetaminphen, Oxycontin, prn hydromorphone)    Hypoxemia, Ground Glass Opacities on CT chest  COVID Rule Out  Reported shortness of breath and central back pain following the fall. One loose stool yesterday with nausea. No cough, sore throat, change in taste/smell or other symptoms consistent with COVID. Afebrile, WBC normal. 87% on room air in ED, of note had recently received narcotic medications. Improved on 2 LPM oxygen. CT chest shows \"three ground-glass opacities, two of which are in the right upper lobe and one of which is in the superior segment of the left lower lobe\" which can be consistent with infectious etiology such as COVID 19 vs low grade adenocarcinoma vs other, radiologist recommends follow up in 3 months. No clear indication for antibiotics at this time with normal WBC and afebrile. No current symptoms on admission, attempting to wean oxygen.   COVID negative, " "precautions in place, not low suspicion at this point given CT findings and hypoxia  NeedS  follow up CT in 3 months as outpatient  -re-test covid in 48 HRS 6/4. will order test.      [place] on CKD  Creatinine 1.47, GFR 32. Baseline variable over past year, Creatinine 0.97-1.4, GFR 32-53. Unclear etiology though renal function has been quite variable recently.  Stable. At baseline now.     Thyroid Nodule on CT, incidental finding  CT cervical spine incidentally found \"Heterogeneous attenuation of the thyroid gland, with a grossly stable hyperdense nodule along the posterior margin of the left thyroid lobe that measures 2.6 x 2.5 x 3.0 cm in size. Given size, consider further evaluation with nonemergent thyroid ultrasound.\"  - defer to outpatient provider for further evaluation    Paroxysmal Atrial Fibrillation  Recently diagnosed during last admission and started on warfarin. INR 1.7.   - continue home warfarin, pharmacy to dose    COPD, restrictive lung disease  No PFTs on file, both restrictive and obstructive lung disease noted in history.  - continue home Breo Ellipta     Chronic Diastolic Congestive Heart Failure   Current weight of 181 lb, stable. Last echocardiogram on 2/2019 demonstrated an EF of 55-60% and normal LV, normal RV, mild left atrial dilation, mild atrial root dilation and moderate diastolic dysfunction.  - continue  home furosemide      Coronary Artery Disease  Noted in history though unclear diagnosis, no prior cardiac testing on file to verify or notes from cardiology.  - continue home statin     Chronic pain, right hip, low back, left shoulder  History of pelvic fracture  Follows with pain medicine last seen 11/2019, managed on acetaminophen, Cymbalta, gabapentin, oxycodone, and tizanidine. Using low dose of prednisone for shoulder pain.  Having lot of pain now inspite of prn dilaudid bid  - continue home Oxycontin,  prednisone 5 mg daily, gabapentin, duloxetine. Increase dilaudid to q4 hrs " "prn     Hypertension  Chronic. Blood pressures reviewed, stable.   - continue home lisinopril, furosemide, nifedipine     Hyperlipidemia  Chronic.  - continue home atorvastatin     History of CVA/TIA  S/P Carotid Endarterectomy 2017  Brain aneurysm s/p clipping  History of CVA 2017 s/p endarterectomy in 2017. Previous clipping of aneurysm.  - continue home statin     Gastroesophageal reflux disease  Chronic.  - continue home omeprazole      Chronic Normocytic Anemia  Hgb 12.3. Baseline recently ~8-10. Stable. Recently re-started iron supplementation. Stable at baseline.   - continue home iron        MIMI BULLARD MD   Pg [phone]    DVT Prhylaxis: Warfarin  Code Status: Prior    ROS:  As described in A/P and Exam.  Otherwise ALL are  negative.    PHYSICAL EXAM:  All vitals have been reviewed    Blood pressure (!) 144/64, pulse 69, temperature 98  F (36.7  C), temperature source Oral, resp. rate 18, height 1.6 m (5' 3\"), weight 84.5 kg (186 lb 4.6 oz), SpO2 95 %.    No intake/output data recorded.     Seen on virtual visit.     GENERAL APPEARANCE: healthy, alert and no distress  EYES: conjunctiva clear, eyes grossly normal      ROUTINE  LABS (Last four results)  CMP  Recent Labs   Lab 06/03/20  0422 06/01/20  2346    144   POTASSIUM 4.1 4.2   CHLORIDE 110* 110*   CO2 30 27   ANIONGAP 4 7   * 114*   BUN 27 32*   CR 1.11* 1.47*   GFRESTIMATED 44* 32*   GFRESTBLACK 52* 37*   BLAIR 8.8 9.8   PROTTOTAL  --  6.5*   ALBUMIN  --  3.3*   BILITOTAL  --  0.3   ALKPHOS  --  80   AST  --  15   ALT  --  18     CBC  Recent Labs   Lab 06/03/20  0422 06/01/20  2346   WBC 6.2 7.8   RBC 3.51* 4.35   HGB 9.8* 12.3   HCT 32.7* 40.5   MCV 93 93   MCH 27.9 28.3   MCHC 30.0* 30.4*   RDW 16.0* 15.9*    236     INR  Recent Labs   Lab 06/03/20  0422 06/01/20  2349   INR 1.89* 1.70*     Arterial Blood GasNo lab results found in last 7 days.    No results found for this or any previous visit (from the past 24 hour(s)).    "

## 2020-06-03 NOTE — PROGRESS NOTES
TRANSITIONS OF CARE (AJAY) LOG   AJAY tasks should be completed by the CC within one (1) business day of notification of each transition. Follow up contact with member is required after return to their usual care setting.  Note:  If CC finds out about the transitions fifteen (15) days or more after the member has returned to their usual care setting, no AJAY log is needed. However, the CC should check in with the member to discuss the transition process, any changes needed to the care plan and document it in a case note.    Member [place]:  [place]azmin Clifton [facility] [place]  [employer] Member ID#: [id number]   [place] [facility] [place] Coord[place] Contact:  Mirian Weldon MA, [facility] Agency/[facility]/Care System: [facility]   Transition Communication Actions from Care Management Contact   Transition #1   Notification Date: 6-2-20 Transition Date:   6-1-20 Transition From: Assisted [facility]      Is this the member s usual care setting?               yes Transition To: [facility]    Transition Type:  Unplanned  Reason for Admission/Comments:  Fall      Shared CC contact info, care plan/services with receiving setting--Date completed: 6-2-20    Notified PCP of transition--Date completed:  6-2-20     via  EMR   Transition #2   Transition #3  (if applicable)   Notification Date: 6-5-20         Transition To:  Re[facility]iation [facility]    Transition Date: 6-5-20     Transition Type:    Planned  Notified PCP -- Date completed: 6-5-20              Shared CC contact info, care plan/services with receiving setting or, if applicable, home care agency--Date completed:  6-5-20  *Complete additional tasks below, if this transition is a return to usual care setting.      Comments:  Member is now a 2 person transfer assist.  CC will follow up with TCU SW as needed     Notification Date:  6-23-20        Transition To:  [facility]   Transition  Date:   6-23-20           Transition Type:    Planned  Notified PCP--Date completed: 6-23-20         Shared CC contact info, care plan/services with receiving setting or, if applicable, home care agency--Date completed: 6-23-20      *Complete additional tasks below, if this transition is a return to usual care setting.      Comments:  Member returning with more support from AL staff      *Complete tasks below when the member is discharging TO their usual care setting within one (1) business day of notification.  For situations where the Care Coordinator is notified of the discharge prior to the date of discharge, the Care Coordinator must follow up with the member or designated representative to confirm that discharge actually occurred and discuss required AJAY tasks as outlined in the AJAY Instructions.  (This includes situations where it may be a  new  usual care setting for the member. (i.e., a community member who decides upon permanent nursing home placement following hospitalization and rehab).    Date completed: 6-26-20  Communicated with member or their designated representative about the following:  care transition process; about changes to the member s health status; plan of care updates; education about transitions and how to prevent unplanned transitions/readmissions  Four Pillars for Optimal Transition:    Check  Yes  - if the member, family member and/or SNF/facility staff manages the following:    If  No  provide explanation in the comments section.          [x]  Yes     []  No     Does the member have a follow-up appointment scheduled with primary care or specialist? (Mental health hospitalizations--the appt. should be w/in 7 days)   [x]  Yes     []  No     Can the member manage their medications or is there a system in place to manage medications (e.g. home care set-up)?         [x]  Yes     []  No     Can the member verbalize warning signs and symptoms to watch for and how to respond?         [x]   Yes     []  No     Does the member use a Personal Health Care Record?  Check  Yes  if visit summary, discharge summary, and/or healthcare summary are being used as a PHR.                                                                                                                                                                                    [x] Yes      [] No      Have you updated the member s care plan?  If  No  provide explanation in comments.   Comments:

## 2020-06-03 NOTE — PLAN OF CARE
S:  Patient inpatient at [facility]    B:  Patient admitted 6/2 after fall at [facility]    A:  Patient required 2.5 LTR/min of O2 to maintain saturation 88%-94%  had apneic episode on room air with O2 dropping to mid 70s  Vitals otherwise stable  Lung sounds clear  Pain controlled with medications as ordered  Writer changed Mepilex on patients left shin  Mepilex on left elbow was clean and dry  CoVid rule out, one test is negative, second pending    R:  Patient did sleep for most of night    Lit Prieto RN

## 2020-06-03 NOTE — PLAN OF CARE
Based on System inpatient rehab protocol for Persons Under Investigation of COVID-19 (PUI) patient does not meet criteria for therapy at this time. Will re-assess tomorrow if imminent discharge recommendations are needed.

## 2020-06-04 NOTE — PLAN OF CARE
Discharge Planner PT   Patient plan for discharge: back to [facility]  Current status: needing modA x 2 for supine <> sit, sit <> stand ModA x 2, stood with modA attempted transfer to chair with FWW but unable to step with feet due to weakness, needing to sit back down.  In severe pain in the back with rolling and moving in bed   Barriers to return to prior living situation: pain and transfers  Recommendations for discharge: TCU  Rationale for recommendations: needing A x 2 for transfers currently and was only Ax 1 at baseline in [facility]        Entered by: Margaret Banegas 06/04/2020 4:09 PM

## 2020-06-04 NOTE — PROGRESS NOTES
Patient Covid 19 test for antibodies was discontinued per MD order, PCR swab Covid 19 test ordered.

## 2020-06-04 NOTE — PLAN OF CARE
Based on System inpatient rehab protocol for Persons Under Investigation of COVID-19 (PUI) patient does not meet criteria for therapy at this time. Will re-assess tomorrow if imminent discharge recommendations are needed.       Margaret Banegas  PT, DPT       6/4/2020   [address] [address]  [email or web address]  Voicemail: [phone]

## 2020-06-04 NOTE — PROGRESS NOTES
06/04/20 1604   Quick Adds   Type of Visit Initial PT Evaluation   Living Environment   Lives With facility resident   Living Arrangements assisted living   Home Accessibility no concerns   Transportation Anticipated agency   Living Environment Comment recieves assist with all transfers, bed mobility, dressing and ADLS with Ax 1    Self-Care   Usual Activity Tolerance fair   Current Activity Tolerance poor   Regular Exercise No   Equipment Currently Used at Home grab bar, toilet;hospital bed;wheelchair, manual   Activity/Exercise/Self-Care Comment completes pivot transfers at [facility]   Functional Level Prior   Ambulation 4-->completely dependent   Transferring 3-->assistive equipment and person   Toileting 3-->assistive equipment and person   Bathing 3-->assistive equipment and person   Communication 0-->understands/communicates without difficulty   Swallowing 2-->difficulty swallowing liquids/foods   Cognition 0 - no cognition issues reported   Fall history within last six months yes   Number of times patient has fallen within last six months 2   Which of the above functional risks had a recent onset or change? transferring;toileting   Prior Functional Level Comment PLOF_ - Pt indep. with  transfers- uses a floor to ceiling bar w/ transfers from her chair; utilizes  a manual wc apartment within her apartment, an elelctric wc   General Information   Onset of Illness/Injury or Date of Surgery - Date 06/01/20   Referring Physician Alexa Mclaughlin   Patient/Family Goals Statement to go back to [facility]   Pertinent History of Current Problem (include personal factors and/or comorbidities that impact the POC) Jazmin Clifton is a 87 year old female admitted on 6/1/2020. She has history of COPD/restrictive lung disease, paroxysmal atrial fibrillation, diastolic congestive heart failure, hypertension, coronary artery disease, hyperlipidemia, CVA, GERD and anemia. She presents following a fall.    Precautions/Limitations fall  "precautions   General Info Comments up with assist   Cognitive Status Examination   Orientation orientation to person, place and time   Level of Consciousness alert   Follows Commands and Answers Questions 100% of the time   Personal Safety and Judgment intact   Memory intact   Range of Motion (ROM)   ROM Comment AROM impaired due to pain and weakness, PROM hip flexion to 90 degrees, PROM knee flex/ext WNL B    Strength   Strength Comments hip flexion 2/5 B, knee flexion/ext 3/5 B    Bed Mobility   Bed Mobility Comments supine <> sit ModA x 2   Transfer Skills   Transfer Comments sit <> stand ModA x 2, stood with modA attempted transfer to chair with FWW but unable to step with feet.    Gait   Gait Comments unable to ambulate   Balance   Balance Comments needs UE support while standing, sittig able to maintain balance with sitting EOB    General Therapy Interventions   Planned Therapy Interventions bed mobility training;balance training;gait training;neuromuscular re-education;ROM;strengthening;stretching;transfer training   Clinical Impression   Criteria for Skilled Therapeutic Intervention yes, treatment indicated   PT Diagnosis weakness    Influenced by the following impairments pain, weakness, decreased activity tolerance   Functional limitations due to impairments transfers, bed mobility    Clinical Presentation Stable/Uncomplicated   Clinical Presentation Rationale clinical decision making and chart review   Clinical Decision Making (Complexity) Moderate complexity   Therapy Frequency Daily   Predicted Duration of Therapy Intervention (days/wks) 2 days   Anticipated Discharge Disposition Transitional Care Facility   Risk & Benefits of therapy have been explained Yes   Patient, Family & other staff in agreement with plan of care Yes   [facility] AM-PAC TM \"6 Clicks\"   2016, Trustees of [facility], under license to [employer].  All rights reserved.   6 Clicks Short Forms Basic Mobility Inpatient " "Short Form   [facility] AM-PAC  \"6 Clicks\" V.2 Basic Mobility Inpatient Short Form   1. Turning from your back to your side while in a flat bed without using bedrails? 2 - A Lot   2. Moving from lying on your back to sitting on the side of a flat bed without using bedrails? 2 - A Lot   3. Moving to and from a bed to a chair (including a wheelchair)? 2 - A Lot   4. Standing up from a chair using your arms (e.g., wheelchair, or bedside chair)? 2 - A Lot   5. To walk in hospital room? 1 - Total   6. Climbing 3-5 steps with a railing? 1 - Total   Basic Mobility Raw Score (Score out of 24.Lower scores equate to lower levels of function) 10   Total Evaluation Time   Total Evaluation Time (Minutes) 10       Margaret Banegas  PT, DPT       6/4/2020   [address] [address]  [email or web address]  Voicemail: [phone]         "

## 2020-06-04 NOTE — PROGRESS NOTES
"REASON FOR ADMISSION / INTERVAL HISTORY:  Jazmin Clifton is a 87 year old female admitted on 6/1/2020. She has history of COPD/restrictive lung disease, paroxysmal atrial fibrillation, diastolic congestive heart failure, hypertension, coronary artery disease, hyperlipidemia, CVA, GERD and anemia. She presents following a fall.      ASSESSMENT/PLAN:   Fall, mechanical  Lost balance when getting off toilet, hitting head on counter and landing on floor. No loss of consciousness. Reported head/neck pain, central back/chest pain and shortness of breath following. Imaging in ED included: CT head, CT cervical spine, CT lumbar spine, CT pelvis, XR tibia/fibula all of which were negative for acute process. CT chest showed no signs of trauma, though did have ground glass opacities as noted below. Appears to be mechanical fall, no clear signs of injury on work up.   - PT/OT consult, care transitions consult  - pain: home pain regimen (acetaminphen, Oxycontin, prn hydromorphone)  - complains of pain all over, but then admits she has had it for months or years.    - doesn't move much at all.  Seems to be her baseline.       Hypoxemia, Ground Glass Opacities on CT chest  COVID Rule Out  Reported shortness of breath and central back pain following the fall. One loose stool yesterday with nausea. No cough, sore throat, change in taste/smell or other symptoms consistent with COVID. Afebrile, WBC normal. 87% on room air in ED, of note had recently received narcotic medications. Improved on 2 LPM oxygen. CT chest shows \"three ground-glass opacities, two of which are in the right upper lobe and one of which is in the superior segment of the left lower lobe\" which can be consistent with infectious etiology such as COVID 19 vs low grade adenocarcinoma vs other, radiologist recommends follow up in 3 months. No clear indication for antibiotics at this time with normal WBC and afebrile. No current symptoms on admission, attempting to wean " "oxygen.   COVID negative, precautions in place, not low suspicion at this point given CT findings and hypoxia  NeedS  follow up CT in 3 months as outpatient  -re-test covid in 48 HRS 6/4. Came back negative late 6/4.  Will have PT see but likely back to [place] in AM.    -          [place] on CKD  Creatinine 1.47, GFR 32. Baseline variable over past year, Creatinine 0.97-1.4, GFR 32-53. Unclear etiology though renal function has been quite variable recently.  Stable.   -resolved.      Thyroid Nodule on CT, incidental finding  CT cervical spine incidentally found \"Heterogeneous attenuation of the thyroid gland, with a grossly stable hyperdense nodule along the posterior margin of the left thyroid lobe that measures 2.6 x 2.5 x 3.0 cm in size. Given size, consider further evaluation with nonemergent thyroid ultrasound.\"  - defer to outpatient provider for further evaluation     Paroxysmal Atrial Fibrillation  Recently diagnosed during last admission and started on warfarin. INR 1.7.   - continue home warfarin, pharmacy to dose     COPD, restrictive lung disease  No PFTs on file, both restrictive and obstructive lung disease noted in history.  - continue home Breo Ellipta     Chronic Diastolic Congestive Heart Failure   Current weight of 181 lb, stable. Last echocardiogram on 2/2019 demonstrated an EF of 55-60% and normal LV, normal RV, mild left atrial dilation, mild atrial root dilation and moderate diastolic dysfunction.  - continue  home furosemide      Coronary Artery Disease  Noted in history though unclear diagnosis, no prior cardiac testing on file to verify or notes from cardiology.  - continue home statin     Chronic pain, right hip, low back, left shoulder  History of pelvic fracture  Follows with pain medicine last seen 11/2019, managed on acetaminophen, Cymbalta, gabapentin, oxycodone, and tizanidine. Using low dose of prednisone for shoulder pain.  Having lot of pain now inspite of prn dilaudid bid  - continue " "home Oxycontin,  prednisone 5 mg daily, gabapentin, duloxetine. Increase dilaudid to q4 hrs prn  - has been on chronic oxycontin, so unlikely to be able to control her pain.      Hypertension  Chronic. Blood pressures reviewed, stable.   - continue home lisinopril, furosemide, nifedipine     Hyperlipidemia  Chronic.  - continue home atorvastatin     History of CVA/TIA  S/P Carotid Endarterectomy 2017  Brain aneurysm s/p clipping  History of CVA 2017 s/p endarterectomy in 2017. Previous clipping of aneurysm.  - continue home statin     Gastroesophageal reflux disease  Chronic.  - continue home omeprazole      Chronic Normocytic Anemia  Hgb 12.3. Baseline recently ~8-10. Stable. Recently re-started iron supplementation. Stable at baseline.   - continue home iron       DVT Prhylaxis: Warfarin  Code Status: Prior  DISPO  Likely back to [facility] in AM      SUBJECTIVE:   complains of pain all over,  Upper and lower extremities.  Low back  Pain is the same as always, but maybe a little worse.    Does complains of some SOB, no cough        ROS:4 point ROS including Respiratory, CV, GI and , other than that noted in the HPI,  is negative     OBJECTIVE:   /54   Pulse 69   Temp 98.5  F (36.9  C) (Oral)   Resp 18   Ht 1.6 m (5' 3\")   Wt 85.3 kg (188 lb 0.8 oz)   SpO2 95%   BMI 33.31 kg/m      GENERAL APPEARANCE:  Alert, Ox2, doesn't know month      RESP:clear      CV: regular rate and rhythm,  no murmur , edema: none       Abdomen: soft, nontender, no liver or spleen enlargement, no masses, BSs normal   Skin: no cyanosis, pallor, or jaundice  Pain when I touch her everywhere, but no worse in any joint that I move.      CMP  Recent Labs   Lab 06/03/20  0422 06/01/20  2346    144   POTASSIUM 4.1 4.2   CHLORIDE 110* 110*   CO2 30 27   ANIONGAP 4 7   * 114*   BUN 27 32*   CR 1.11* 1.47*   GFRESTIMATED 44* 32*   GFRESTBLACK 52* 37*   BLAIR 8.8 9.8   PROTTOTAL  --  6.5*   ALBUMIN  --  3.3*   BILITOTAL  --  0.3 "   ALKPHOS  --  80   AST  --  15   ALT  --  18     CBC  Recent Labs   Lab 06/03/20  0422 06/01/20  2346   WBC 6.2 7.8   RBC 3.51* 4.35   HGB 9.8* 12.3   HCT 32.7* 40.5   MCV 93 93   MCH 27.9 28.3   MCHC 30.0* 30.4*   RDW 16.0* 15.9*    236     INR  Recent Labs   Lab 06/04/20  0423 06/03/20  0422 06/01/20  2349   INR 2.17* 1.89* 1.70*     Arterial BloodGas  No lab results found in last 7 days.   Venous Blood Gas  No lab results found in last 7 days.    Medications     acetaminophen  1,000 mg Oral TID     atorvastatin  40 mg Oral QPM     DULoxetine  30 mg Oral At Bedtime     DULoxetine  60 mg Oral Daily     ferrous sulfate  325 mg Oral Daily     fluticasone-vilanterol  1 puff Inhalation Daily     furosemide  20 mg Oral Daily     lisinopril  20 mg Oral Daily     miconazole   Topical BID     NIFEdipine ER OSMOTIC  60 mg Oral Daily     omeprazole  40 mg Oral QAM     oxyCODONE  10 mg Oral Q12H     predniSONE  5 mg Oral Daily     warfarin ANTICOAGULANT  3 mg Oral ONCE at 18:00     No intake or output data in the 24 hours ending 06/04/20 [id number]

## 2020-06-04 NOTE — PLAN OF CARE
Patient still c/o pain all over. Giving scheduled oxycontin and AS NEEDED dilaudid oral. Patient is alert, oriented to person and place. Remains incontinent of urine. Gave Miralax for bowels. 2nd Covid swab complete, results pending. Spoke with son Chi, gave him an update. Patient using call light appropriately.

## 2020-06-05 NOTE — PLAN OF CARE
Scheduled oxycontin given for pain. Patient repositioned and checked for incontinence Q2h. Patient appears to rest comfortably between cares.

## 2020-06-05 NOTE — PLAN OF CARE
Physical Therapy Discharge Summary    Reason for therapy discharge:    Discharged to transitional care facility.    Progress towards therapy goal(s). See goals on Care Plan in [facility] electronic health record for goal details.  Goals partially met.  Barriers to achieving goals:   limited tolerance for therapy and discharge from facility.    Therapy recommendation(s):    Continued therapy is recommended.  Rationale/Recommendations:  improving ability to complete stand pivot transfers. Needs to be an Ax 1 in order to return to [facility] with stand pivot transfers. .

## 2020-06-05 NOTE — PLAN OF CARE
[facility] DISCHARGE NOTE    Patient discharged to transitional care unit at 1:33 PM via wheel chair. Accompanied by transport and staff. Discharge instructions reviewed with caregiver, opportunity offered to ask questions. Prescriptions sent with patient to fill . All belongings sent with patient.    Renu Suarez RN

## 2020-06-05 NOTE — DISCHARGE SUMMARY
/  [place] [facility]ist Discharge Summary    Jazmin Clifton MRN# [id number]   Age: 87 year old YOB: 1932     Date of Admission:  6/1/2020  Date of Discharge::  6/5/2020  2:20 PM  Admitting Physician:  Shamar Orellana MD  Discharge Physician:  Lincoln Brady MD  Primary Physician: Junie Estrella       Home clinic:                Discharge Diagnosis:   Principle diagnosis: X-ray findings and hypoxia suspicious for COVID, negative COVID x2    Secondary diagnoses:  Fall secondary to weakness  Weakness secondary to deconditioning  Diffuse muscular pain secondary to chronic narcotic use  Chronic pain syndrome  GERD  History of CVA  COPD  Chronic diastolic heart failure  Thyroid nodule on CT, incidental finding  [place]       Discharge Instructions:   For the fall  -there is some increased weakness due to deconditioning  -this could benefit from some therapy in the TCU  -no specific injury identified     For the frequent vomiting  -this has been going on for months  -suspicious for a medication side effect.    -would consider trying to eliminate one medication after another to see which one is doing this, but this would take months.        Follow up with primary care provider in 7 days        Procedures:       Results for orders placed or performed during the hospital encounter of 06/01/20   CT Head w/o Contrast    Narrative    EXAM: CT HEAD W/O CONTRAST  LOCATION: [facility]  DATE/TIME: 6/2/2020 12:32 AM    INDICATION: Head injury after fall. Anticoagulated.  COMPARISON: CT head dated 11/02/2019  TECHNIQUE: Routine without IV contrast. Multiplanar reformats. Dose reduction techniques were used.    FINDINGS:  INTRACRANIAL CONTENTS: Redemonstrated postprocedural changes related to a right pterional cranioplasty with aneurysm clips in the right middle cranial fossa. Metallic streak artifact limits evaluation of adjacent structures. No definite acute   intracranial hemorrhage or  adverse intracranial mass effect. No CT evidence of acute infarct. Moderate presumed chronic small vessel ischemic changes. Mild generalized volume loss. No hydrocephalus.     VISUALIZED ORBITS/SINUSES/MASTOIDS: Prior bilateral cataract surgery. Visualized portions of the orbits are otherwise unremarkable. No paranasal sinus mucosal disease. No middle ear or mastoid effusion.    BONES/SOFT TISSUES: No acute abnormality.      Impression    IMPRESSION:  1.  No acute intracranial process.  2.  Chronic age-related and postoperative changes as described.   Cervical spine CT w/o contrast    Narrative    EXAM: CT CERVICAL SPINE W/O CONTRAST  LOCATION: [facility]  DATE/TIME: 6/2/2020 12:32 AM    INDICATION: Neck pain after fall  COMPARISON: 11/02/2019 cervical spine CT  TECHNIQUE: Routine without IV contrast. Multiplanar reformats. Dose reduction techniques were used.    FINDINGS:  VERTEBRA: Stable alignment with right apex curvature of the cervical spine and straightening of the cervical spine lordosis. There is degenerative anterolisthesis of C3 on C4 that measures approximately 3 mm. The bones are diffusely demineralized. No   evidence of an acute displaced fracture.     CANAL/FORAMINA: There are multilevel degenerative changes of the cervical spine, with at least mild canal stenosis at C3-C4, C4-C5, and C5-C6. Multilevel bilateral neural foraminal narrowing.    PARASPINAL: No acute extraspinal abnormality. Stable heterogeneous appearance of the thyroid gland with a hyperdense nodule along the posterior margin of the left thyroid lobe, measuring 2.6 x 2.5 x 3.0 cm. Consider further evaluation with nonemergent   thyroid ultrasound.      Impression    IMPRESSION:  1.  No evidence of an acute displaced fracture of the cervical spine.  2.  Multilevel degenerative changes, resulting in at least mild canal stenosis from C3-C4 through C5-C6. Multilevel bilateral high-grade neural foraminal narrowing.  3.   Heterogeneous attenuation of the thyroid gland, with a grossly stable hyperdense nodule along the posterior margin of the left thyroid lobe that measures 2.6 x 2.5 x 3.0 cm in size. Given size, consider further evaluation with nonemergent thyroid   ultrasound.   Pelvis XR w/ unilateral hip right    Narrative    EXAM: XR PELVIS AND HIP RIGHT 1 VIEW  LOCATION: [facility]  DATE/TIME: 6/2/2020 12:33 AM    INDICATION: Unwitnessed fall. Evaluate for fracture.  COMPARISON: None.      Impression    IMPRESSION: Allowing for osteopenia there is a very subtle area of lucency along the medial aspect of the left superior pubic ramus suspicious for a nondisplaced fracture. Old healed fracture left inferior pubic ramus. No definitive evidence for   displaced fracture involving the right or left superior pubic ramus. No evidence for fracture involving the proximal right femur or the left femur adjacent to a left SATHYA. No evidence for dislocation of the SATHYA. Pedicle screw and malinda fixation lower lumbar   spine. Minimal degenerative changes both SI joints. Advanced atherosclerotic vascular calcification.   Lumbar spine CT w/o contrast    Narrative    EXAM: CT LUMBAR SPINE W/O CONTRAST  LOCATION: [facility]  DATE/TIME: 6/2/2020 12:32 AM    INDICATION: Back pain after fall  COMPARISON: CT abdomen and pelvis dated 03/09/2020  TECHNIQUE: Routine without IV contrast. Multiplanar reformats.  Dose reduction techniques were used.     FINDINGS:  VERTEBRA: The inferior aspect of the L5 vertebral body and the upper sacrum are not included in the field-of-view. Redemonstrated postoperative changes at L2-L5 posterior fusion with bilateral pedicle screws at L2, L3, and L5 and on the right at L4.   There is mature osseous fusion across the posterior elements. There are bilateral laminectomy changes and posterior decompression from L3 through L5. The hardware appears grossly intact there is no CT evidence of loosening.  Alignment is unchanged, with   grade 1 retrolisthesis of T12 on L1 and L1 on L2, and L2 on L3 and grade 1 anterolisthesis of L4 on L5. The bones are diffusely demineralized. Chronic compression deformities at T11 and T12 are grossly unchanged. No definite acute displaced fracture.     CANAL/FORAMINA: Multilevel degenerative changes of the lumbar spine, without high-grade osseous spinal canal stenosis. There is multilevel bilateral neural foraminal narrowing, with at least moderate narrowing on the left at L4-L5 and mild to moderate   bilateral narrowing at L3-L4 and L2-L3. At least moderate narrowing bilaterally at T12-L1 and L1-L2.    PARASPINAL: No acute extraspinal abnormality. Incompletely evaluated lesions in the bilateral kidneys with bilateral and renal atrophy. Scattered atherosclerotic calcification.      Impression    IMPRESSION:  1.  Degenerative and postoperative changes of the lumbar spine, without evidence of an acute osseous abnormality. Please note, the sacrum is not entirely included in the field-of-view, and if the patient is experiencing sacral pain consider further   evaluation with CT of the pelvis.   Chest CT w/o contrast    Narrative    EXAM: CT CHEST W/O CONTRAST  LOCATION: [facility]  DATE/TIME: 6/2/2020 12:32 AM    INDICATION: Chest pain and shortness of breath. Evaluate for rib fracture or other acute process. Recent fall.    COMPARISON: None.    TECHNIQUE: CT chest without IV contrast. Multiplanar reformats were obtained. Dose reduction techniques were used.    CONTRAST: None.    FINDINGS:   LUNGS AND PLEURA: Three focal areas of ground-glass opacity, one of which is in the superior lateral aspect of the left lower lobe (series 6, image 98) and the other ground-glass opacities are present in the posterior lateral aspect of the right upper   lobe and each measures 7 mm in size (series 6, image 58). No consolidation. Minimal centrilobular and paraseptal emphysema. Atelectasis  in the lung bases. Evidence for old granulomatous disease. No pleural fluid.    MEDIASTINUM/AXILLAE: Minimal cardiac enlargement. No pericardial fluid. No lymphadenopathy. Calcified mediastinal lymph nodes. Atherosclerotic vascular calcification. Marked coronary artery calcification. Normal caliber esophagus.    UPPER ABDOMEN: Cholecystectomy. Exophytic bilateral high-density lesions involve the kidneys, most compatible with multiple hemorrhagic cysts. No hydronephrosis. Splenic granulomas.    MUSCULOSKELETAL: Degenerative hypertrophic changes within the spine. Marked compression changes involving the upper lumbar spine. No evidence for acute displaced rib fracture.      Impression    IMPRESSION:   1.  No evidence for consolidation or acute focal alveolar infiltrate. There are three ground-glass opacities, two of which are in the right upper lobe and one of which is in the superior segment of the left lower lobe. Ground-glass opacities such as   these can be seen with COVID-19 pneumonia, though are nonspecific and can occur with a variety of infectious and noninfectious processes. Low grade adenocarcinoma can even have this appearance. Therefore, recommend CT follow-up within three months to   assess for resolution. Recommend correlation with patient's laboratory and exposure history.    2.  Atelectasis involving the lung bases. No pleural fluid. Minimal centrilobular and paraseptal emphysema.    3.  Atherosclerotic vascular calcification including marked coronary artery calcification.    4.  No definitive evidence for acute displaced fracture. Marked degenerative changes in the spine.     XR Tibia & Fibula Bilateral 2 Views    Narrative    EXAM: BILATERAL TIBIAS AND FIBULAS 8 VIEWS  LOCATION: [facility]  DATE/TIME: 6/2/2020 12:33 AM    INDICATION: Unwitnessed fall. Skin abrasions.    COMPARISON: None.      Impression    IMPRESSION:  1. No visualized acute fracture or malalignment of bilateral tibias  or fibulas.  2. No knee joint effusions bilaterally.  3. Diffuse osteopenia.  4. Mild degenerative changes in bilateral knee joints.     CT Pelvis Bone wo Contrast    Narrative    EXAM: CT PELVIS BONE WITHOUT CONTRAST  LOCATION: [facility]  DATE/TIME: 6/2/2020 2:08 AM    INDICATION: Unwitnessed fall. Osteopenia. Possible subtle pubic ramus fracture.    COMPARISON: 11/12/2019.    TECHNIQUE: CT scan of the pelvis was performed without IV contrast. Multiplanar reformats were obtained. Dose reduction techniques were used.    CONTRAST: None.    FINDINGS:  PELVIS ORGANS: The uterus is not visualized, likely representing a prior hysterectomy. Several colonic diverticula are present without evidence of diverticulitis.    MUSCULOSKELETAL: No visualized acute fracture of the pelvic bones or bilateral hips. An old fracture of the left inferior pubic ramus is present. Partial visualization of a left hip arthroplasty. No evidence of hardware failure. Partial visualization of   fusion hardware in the lower lumbar spine. Moderate degenerative changes in the right hip joint.    OTHER: Atherosclerotic calcification in the abdominal aorta. Partial visualization of a large 8 x 6 cm circumscribed fluid collection in the right inguinal region that was also present on the comparison study. This is nonspecific, but could represent a   seroma or lymphocele. Moderate-sized paraumbilical hernia containing fat.      Impression    IMPRESSION: No acute fracture or malalignment of the pelvis or bilateral hips.                      Allergies:      Allergies   Allergen Reactions     Accupril Other (See Comments)     Dizziness       Ace Inhibitors Other (See Comments)     Accupril - dizziness     Augmentin Unknown     Blood-Group Specific Substance Other (See Comments)     Patient has a Non-specific antibody. Blood Product orders may be delayed.  Draw one red top and two purple top tubes for ALL Type and Screen/ Type and Crossmatch  orders.      Levofloxacin Other (See Comments) and Muscle Pain (Myalgia)     Pt prescribed ciprofloxacin in Jan 2018 (no rxn documented)         Morphine Other (See Comments)     hallucinations     Nitrofurantoin Nausea and Vomiting     Norvasc [Amlodipine Besylate] Swelling     Leg swelling       Quinapril Other (See Comments)     Hypertension. 3.29.18 - Takes and tolerates lisinopril                      Discharge Medications:     Current Discharge Medication List      CONTINUE these medications which have NOT CHANGED    Details   acetaminophen (TYLENOL) 500 MG tablet Take 1,000 mg by mouth 3 times daily      atorvastatin (LIPITOR) 40 MG tablet TAKE 1 TABLET BY MOUTH ONCE DAILY  Qty: 28 tablet, Refills: PRN    Associated Diagnoses: Congestive heart failure, unspecified HF chronicity, unspecified heart failure type (H)      benzocaine (ORAJEL/ANBESOL) 10 % gel Take by mouth 4 times daily as needed for moderate pain      bisacodyl (DULCOLAX) 10 MG suppository Place 10 mg rectally daily as needed for constipation      Blood Glucose Monitoring Suppl CORY 2 times daily Call nurse for further directions if blood glucose is less than 80 or greater than 250      BREO ELLIPTA 100-25 MCG/INH inhaler INHALE 1 PUFF BY MOUTH ONCE DAILY  Qty: 1 Inhaler, Refills: 97    Associated Diagnoses: Chronic obstructive pulmonary disease, unspecified COPD type (H)      calcium carbonate (TUMS) 500 MG chewable tablet Take 1 chew tab by mouth 3 times daily as needed      DOK PLUS 50-8.6 MG tablet TAKE 1 TABLET BY MOUTH TWICE DAILY  Qty: 60 tablet, Refills: 98    Associated Diagnoses: Closed fracture of ramus of left pubis, initial encounter (H)      !! DULoxetine (CYMBALTA) 30 MG capsule Take 30 mg by mouth At Bedtime      !! DULoxetine (CYMBALTA) 60 MG capsule TAKE 1 CAPSULE BY MOUTH EVERY MORNING  Qty: 31 capsule, Refills: PRN    Associated Diagnoses: DDD (degenerative disc disease), lumbar; Anaclitic depression      Emollient  (MOISTURIZING LOTION EX) Externally apply topically 2 times daily Bilateral lower extremeties      ferrous sulfate (FEROSUL) 325 (65 Fe) MG tablet Take 1 tablet (325 mg) by mouth daily  Qty: 30 tablet, Refills: 1    Associated Diagnoses: Anemia of chronic renal failure, stage 4 (severe) (H)      furosemide (LASIX) 20 MG tablet TAKE 1 TABLET BY MOUTH ONCE DAILY  Qty: 30 tablet, Refills: 98    Associated Diagnoses: Chronic diastolic congestive heart failure (H)      HYDROmorphone (DILAUDID) 2 MG tablet TAKE 1 TABLET BY MOUTH TWICE DAILY AS NEEDED FOR SEVERE PAIN  Qty: 30 tablet, Refills: 0    Associated Diagnoses: Chronic pain syndrome      hypromellose-dextran (GENTEAL TEARS) 0.1-0.3 % ophthalmic solution Place 1 drop into both eyes every 6 hours as needed for dry eyes      levalbuterol (XOPENEX) 1.25 MG/3ML neb solution Take 1 ampule by nebulization every 4 hours as needed for shortness of breath / dyspnea or wheezing And every 4 hours PRN      Lidocaine (LIDOCARE) 4 % Patch Place 1 patch onto the skin daily To desired area (shoulder or hip). On for 12hrs, off for 12hrs  Qty: 30 patch, Refills: 11    Associated Diagnoses: Chronic left shoulder pain; DDD (degenerative disc disease), lumbar      lisinopril (PRINIVIL/ZESTRIL) 20 MG tablet Take 1 tablet (20 mg) by mouth daily  Qty: 30 tablet, Refills: 11    Associated Diagnoses: Essential hypertension      NIFEdipine ER (ADALAT CC) 60 MG 24 hr tablet Take 60 mg by mouth daily       nystatin (MYCOSTATIN) 405488 UNIT/GM external powder Apply topically 3 times daily  Qty: 1 Bottle, Refills: 97    Associated Diagnoses: Dermatitis associated with moisture      omeprazole (PRILOSEC) 40 MG DR capsule TAKE 1 CAPSULE BY MOUTH ONCE DAILY  Qty: 28 capsule, Refills: PRN    Associated Diagnoses: Gastroesophageal reflux disease without esophagitis      ondansetron (ZOFRAN) 4 MG tablet Take 4 mg by mouth every 6 hours as needed for nausea      OXYCONTIN 10 MG 12 hr tablet TAKE ONE  TABLET BY MOUTH EVERY 12 HOURS DO NOT CRUSH  Qty: 60 tablet, Refills: 0    Associated Diagnoses: DDD (degenerative disc disease), lumbar      predniSONE (DELTASONE) 5 MG tablet TAKE 1 TABLET BY MOUTH ONCE DAILY  Qty: 31 tablet, Refills: PRN    Associated Diagnoses: Chronic left shoulder pain; DDD (degenerative disc disease), lumbar      tiZANidine (ZANAFLEX) 2 MG tablet Take 2 mg by mouth every 8 hours as needed       VITAMIN D3 25 MCG (1000 UT) tablet TAKE 1 TABLET BY MOUTH ONCE DAILY  Qty: 30 tablet, Refills: 97    Associated Diagnoses: Osteoporosis without current pathological fracture, unspecified osteoporosis type      warfarin ANTICOAGULANT (COUMADIN) 1 MG tablet Take 2 tabs (2 mg) every MWF, and take 3 tabs (3 mg) all other days of the week or as directed by the Anticoagulation Clinic  Qty: 90 tablet, Refills: 1    Associated Diagnoses: Paroxysmal atrial fibrillation (H); History of DVT (deep vein thrombosis)      gabapentin (NEURONTIN) 100 MG capsule Take 100 mg by mouth daily      Menthol, Topical Analgesic, (BIOFREEZE) 4 % GEL Externally apply topically 4 times daily as needed To left shoulder and right hip       !! - Potential duplicate medications found. Please discuss with provider.                Consultations:   None           Brief History of Presenting Illness:   Jazmin Clifton is a 87 year old female who presents following a fall.     She states she was going to the restroom. When she got up, she lost her balance and fell off the toilet sideways. She hit her head on the counter on her way down. She did not lose consciousness. No concerning symptoms preceding the fall. Following the fall she had pain in her head and neck. She noted an aching pain in the center of her back with shortness of breath. She denies fever, chills, cough, chest pain, sore throat, change in taste/smell. She did have some nausea with emesis the other morning with an episode of diarrhea.      This morning she states she feels  "well. She does not have any pain currently in her head, neck, back, chest or elsewhere. She does not feel short of breath present.      She has otherwise been feeling well recently.           Hospital Course:   Fall, mechanical  Lost balance when getting off toilet, hitting head on counter and landing on floor. No loss of consciousness. Reported head/neck pain, central back/chest pain and shortness of breath following. Imaging in ED included: CT head, CT cervical spine, CT lumbar spine, CT pelvis, XR tibia/fibula all of which were negative for acute process. CT chest showed no signs of trauma, though did have ground glass opacities as noted below. Appears to be mechanical fall, no clear signs of injury on work up.   - PT/OT consult, care transitions consult  - pain: home pain regimen (acetaminphen, Oxycontin, prn hydromorphone)  - complains of pain all over, but then admits she has had it for months or years.    - doesn't move much at all.  Seems to be her baseline.  -   This hypersensitivity to pain is typical of someone who is been on longstanding narcotics     Hypoxemia, Ground Glass Opacities on CT chest  COVID Rule Out  Reported shortness of breath and central back pain following the fall. One loose stool yesterday with nausea. No cough, sore throat, change in taste/smell or other symptoms consistent with COVID. Afebrile, WBC normal. 87% on room air in ED, of note had recently received narcotic medications. Improved on 2 LPM oxygen. CT chest shows \"three ground-glass opacities, two of which are in the right upper lobe and one of which is in the superior segment of the left lower lobe\" which can be consistent with infectious etiology such as COVID 19 vs low grade adenocarcinoma vs other, radiologist recommends follow up in 3 months. No clear indication for antibiotics at this time with normal WBC and afebrile. No current symptoms on admission, attempting to wean oxygen.   COVID negative, precautions in " "place, not low suspicion at this point given CT findings and hypoxia  NeedS  follow up CT in 3 months as outpatient  -re-test covid in 48 HRS 6/4. Came back negative late 6/4.    -  Ultimately the hypoxia initially was thought to be secondary to an increase in narcotic that she got an hypo-ventilation.  She only needed the oxygen for the first few hours she was hospitalized.        [place] on CKD  Creatinine 1.47, GFR 32. Baseline variable over past year, Creatinine 0.97-1.4, GFR 32-53. Unclear etiology though renal function has been quite variable recently.  Stable.   -resolved.      Thyroid Nodule on CT, incidental finding  CT cervical spine incidentally found \"Heterogeneous attenuation of the thyroid gland, with a grossly stable hyperdense nodule along the posterior margin of the left thyroid lobe that measures 2.6 x 2.5 x 3.0 cm in size. Given size, consider further evaluation with nonemergent thyroid ultrasound.\"  - defer to outpatient provider for further evaluation     Paroxysmal Atrial Fibrillation  Recently diagnosed during last admission and started on warfarin. INR 1.7.   - continue home warfarin, pharmacy to dose     COPD, restrictive lung disease  No PFTs on file, both restrictive and obstructive lung disease noted in history.  - continue home Breo Ellipta     Chronic Diastolic Congestive Heart Failure   Current weight of 181 lb, stable. Last echocardiogram on 2/2019 demonstrated an EF of 55-60% and normal LV, normal RV, mild left atrial dilation, mild atrial root dilation and moderate diastolic dysfunction.  - continue  home furosemide      Coronary Artery Disease  Noted in history though unclear diagnosis, no prior cardiac testing on file to verify or notes from cardiology.  - continue home statin     Chronic pain, right hip, low back, left shoulder  History of pelvic fracture  Follows with pain medicine last seen 11/2019, managed on acetaminophen, Cymbalta, gabapentin, oxycodone, and tizanidine. Using " "low dose of prednisone for shoulder pain.  Having lot of pain now inspite of prn dilaudid bid  - continue home Oxycontin,  prednisone 5 mg daily, gabapentin, duloxetine. Increase dilaudid to q4 hrs prn  - has been on chronic oxycontin for a long time, so unlikely to be able to control her pain.      Hypertension  Chronic. Blood pressures reviewed, stable.   - continue home lisinopril, furosemide, nifedipine     Hyperlipidemia  Chronic.  - continue home atorvastatin     History of CVA/TIA  S/P Carotid Endarterectomy 2017  Brain aneurysm s/p clipping  History of CVA 2017 s/p endarterectomy in 2017. Previous clipping of aneurysm.  - continue home statin     Gastroesophageal reflux disease  Chronic.  - continue home omeprazole      Chronic Normocytic Anemia  Hgb 12.3. Baseline recently ~8-10. Stable. Recently re-started iron supplementation. Stable at baseline.   - continue home iron               Discharge Exam:   OBJECTIVE:   BP (!) 163/64   Pulse 76   Temp 98.1  F (36.7  C) (Oral)   Resp 20   Ht 1.6 m (5' 3\")   Wt 84 kg (185 lb 3 oz)   SpO2 96%   BMI 32.80 kg/m      GENERAL APPEARANCE: Awake, alert, in no apparent distress.     RESP: Clear     CV: regular rate and rhythm, no murmur , edema: None     Abdomen: soft, nontender, no liver or spleen enlargement, no masses, BSs normal   Skin: no cyanosis, pallor, or jaundice   Diffuse tenderness just about everywhere I touch her.  Good passive range of motion of joints though all of them hurt to the same degree           Pending Tests at Discharge:     Unresulted Labs Ordered in the Past 30 Days of this Admission     No orders found from 5/2/2020 to 6/2/2020.                   Discharge Disposition:   Discharged to home      Attestation:  Amount of time performed on this discharge : 45 minutes.    Lincoln Brady MD MD      "

## 2020-06-05 NOTE — PROGRESS NOTES
6-5-20  CC received email from [facility] discharge office that member was transferred to [facility] [address] because she is 2 person transfer at this time.     CC sent email to both SW at [facility] and RN office at [facility] updated them that CC is still involved and asking for updates as needed.   Mirian Weldon MA [facility] Coordinator   [phone] - work cell phone   [phone] - jert fax

## 2020-06-05 NOTE — PLAN OF CARE
"Discharge Planner OT   Patient plan for discharge: planning to return home.     Current status: Eval complete and treatment initiated. Pt completes supine to sit with Mod Ax2 and HOB elevated. Mod Ax2 for sit to stand using FWW. Needs Mod A to maintain upright standing. Stands for ~1 minute. Sling placed and ceiling lift used with Ax2 as pt is not safe to transfer at this time. Pt states pain \"all over\".      Barriers to return to prior living situation: assist level     Recommendations for discharge: TCU    Rationale for recommendations: pt currently needing lift to transfer at this time. At baseline pt was Ax1 for transfers.          Entered by: Christine Cardona 06/05/2020 8:43 AM       "

## 2020-06-05 NOTE — PLAN OF CARE
"Pt has been repositioned every 2 hours and checked for incontinence. No BM this shift (last was 6/1/20). Is A&Ox 3. C/o chronic pain. Was given scheduled pain medicine and prn Dilaudid, which helps. Was up in the chair, using mechanical lift, for meals.   Powder applied to abd and groin.  Was negative x 2 for COVID- precautions dc'd per MD.  PT/OT is working with patient. Is from [facility] and plan is to return back.  VSS. BP (!) 151/48   Pulse 77   Temp 97.8  F (36.6  C) (Oral)   Resp 18   Ht 1.6 m (5' 3\")   Wt 85.3 kg (188 lb 0.8 oz)   SpO2 93%   BMI 33.31 kg/m    Ashley Downs RN BSN    "

## 2020-06-05 NOTE — PROGRESS NOTES
"   06/05/20 0800   Quick Adds   Type of Visit Initial Occupational Therapy Evaluation   Living Environment   Lives With facility resident   Living Arrangements assisted living   Home Accessibility no concerns   Transportation Anticipated agency   Living Environment Comment recieves assist with all transfers, bed mobility, dressing and ADLs with Ax1   Self-Care   Usual Activity Tolerance fair   Current Activity Tolerance poor   Regular Exercise No   Equipment Currently Used at Home grab bar, toilet;grab bar, tub/shower;hospital bed;wheelchair, manual   Functional Level   Ambulation 4-->completely dependent   Transferring 3-->assistive equipment and person   Toileting 3-->assistive equipment and person   Bathing 3-->assistive equipment and person   Dressing 2-->assistive person   Eating 0-->independent   Communication 0-->understands/communicates without difficulty   Fall history within last six months yes   Number of times patient has fallen within last six months 2   Which of the above functional risks had a recent onset or change? ambulation;transferring;toileting;bathing;dressing   General Information   Onset of Illness/Injury or Date of Surgery - Date 06/01/20   Referring Physician Alexa Mclaughlin PA-C    Patient/Family Goals Statement to return to [facility]   Additional Occupational Profile Info/Pertinent History of Current Problem Jazmin Clifton is a 87 year old female admitted on 6/1/2020. She has history of COPD/restrictive lung disease, paroxysmal atrial fibrillation, diastolic congestive heart failure, hypertension, coronary artery disease, hyperlipidemia, CVA, GERD and anemia. She presents following a fall.  COVID negative with 2 tests   Precautions/Limitations fall precautions   Pain Assessment   Patient Currently in Pain Yes, see Vital Sign flowsheet  (\"all over\" does not rate. pain increases with movement. )   Mobility   Bed Mobility Comments Mod Ax2 and HOB elevated   Transfer Skill: Bed to Chair/Chair " "to Bed   Level of Rodman: Bed to Chair unable to perform   Transfer Skill: Sit to Stand   Level of Rodman: Sit/Stand moderate assist (50% patients effort)   Physical Assist/Nonphysical Assist: Sit/Stand 2 persons   Assistive Device for Transfer: Sit/Stand standard walker   Transfer Skill: Toilet Transfer   Level of Rodman: Toilet unable to perform   Upper Body Dressing   Level of Rodman: Dress Upper Body maximum assist (25% patients effort)   Physical Assist/Nonphysical Assist: Dress Upper Body 1 person assist   Lower Body Dressing   Level of Rodman: Dress Lower Body dependent (less than 25% patients effort)   Physical Assist/Nonphysical Assist: Dress Lower Body 1 person assist   Instrumental Activities of Daily Living (IADL)   IADL Comments facility completes   Activities of Daily Living Analysis   Impairments Contributing to Impaired Activities of Daily Living balance impaired;pain;strength decreased   General Therapy Interventions   Planned Therapy Interventions ADL retraining;strengthening   Clinical Impression   Criteria for Skilled Therapeutic Interventions Met yes, treatment indicated   OT Diagnosis decreased independence with ADLs and functional mobility    Influenced by the following impairments weakness   Assessment of Occupational Performance 3-5 Performance Deficits   Identified Performance Deficits dressing, toileting, bathing, related transfers    Clinical Decision Making (Complexity) Low complexity   Therapy Frequency Daily   Predicted Duration of Therapy Intervention (days/wks) 2-3 days   Anticipated Discharge Disposition Transitional Care Facility   Risks and Benefits of Treatment have been explained. Yes   Patient, Family & other staff in agreement with plan of care Yes   [facility] AM-PAC  \"6 Clicks\" Daily Activity Inpatient Short Form   1. Putting on and taking off regular lower body clothing? 1 - Total   2. Bathing (including washing, rinsing, drying)? 2 - A " Lot   3. Toileting, which includes using toilet, bedpan or urinal? 1 - Total   4. Putting on and taking off regular upper body clothing? 2 - A Lot   5. Taking care of personal grooming such as brushing teeth? 2 - A Lot   6. Eating meals? 3 - A Little   Daily Activity Raw Score (Score out of 24.Lower scores equate to lower levels of function) 11   Total Evaluation Time   Total Evaluation Time (Minutes) 10

## 2020-06-05 NOTE — DISCHARGE INSTRUCTIONS
Warfarin instructions:    Continue previous warfarin dose of 2mg Monday, Wednesday, Friday and 3mg Tuesday, Thursday, Saturday and Sunday.   Next INR per geriatrics service.

## 2020-06-05 NOTE — PROGRESS NOTES
Name: Jazmin Clifton    MRN#: [id number]    Reason for Hospitalization: Leg swelling [M79.89]  Abnormal CT of the chest [R93.89]  History of atrial fibrillation [Z86.79]  Contusion of left leg, initial encounter [S80.12XA]  Fall, initial encounter [W19.XXXA]  History of anticoagulant use [Z92.29]    Discharge Date: 6/5/2020    Patient / Family response to discharge plan: Confirmed patient is ready for discharge today.  Spoke with patient via phone.  Discussed therapy recommendations.  Patient is agreeable to TCU.  patient has been accepted at [facility] (Phone: [phone] Fax: [phone]).  She is agreeable with facility.  Discussed transportation options with patient .  Discussed vendor, mode of transportation and anticipated private pay cost.  Patient agrees to private pay cost associated with [employer] services.      Spoke with patient's son, Ed via phone.  Discussed above, he is in agreement.      [employer] wheelchair transportation set for 1330 today    Other Providers (Care Coordinator, County Services, PCA services etc): Yes: emailed updated discharge plan to [facility] Care Coordinator Mirian Weldon MA, [phone].    CTS Hand Off Completed: Yes: to REGIN[facility] CM as above    PAS-RR    Per DHS regulation, CTS team completed and submitted PAS-RR to [place] [employer] on [employer]ging Direct Connect via the Senior Link[employer] Line. CTS team advised SNF and they are aware a PAS-RR has been submitted.     CTS team reviewed with pt or health care agent that they may be contacted for a follow up appointment within 10 days of hospital discharge if SNF stay is <30 days. Contact information for Senior [employer] Line was also provided.     Pt or health care agent verbalized understanding.     PAS #: [id number]    LINN Score: elevated    Future Appointments: No future appointments.    Discharge Disposition: transitional care unit    Discharge Planner   Discharge Plans in progress: completed:  [facility]  Barriers to discharge plan: none  Follow up plan: TCU, CM, pcp       Entered by: Janet March 06/05/2020 11:17 AM         Janet March, ADRIANAN RN  Inpatient RN care coordinator  [facility] [phone]  [facility] [phone]

## 2020-06-05 NOTE — PLAN OF CARE
Occupational Therapy Discharge Summary    Reason for therapy discharge:    Discharged to transitional care facility.    Progress towards therapy goal(s). See goals on Care Plan in [facility] electronic health record for goal details.  Goals partially met.  Barriers to achieving goals:   discharge from facility.    Therapy recommendation(s):    Continued therapy is recommended.  Rationale/Recommendations:  TCU to increase independence with ADLs and related transfers. At baseline pt is Ax1 to transfer. Currently needing ceiling lift.

## 2020-06-08 NOTE — PROGRESS NOTES
ANTICOAGULATION MANAGEMENT     Patient Name:  Jazmin Clifton  Date:  2020    ASSESSMENT /SUBJECTIVE:    Today's INR result of 2.3 is therapeutic. Goal INR of 2.0-3.0      Warfarin dose taken: Warfarin taken as previously instructed    Diet: No new diet changes affecting INR    Medication changes/ interactions: No new medications/supplements affecting INR    Previous INR: Therapeutic     S/S of bleeding or thromboembolism: Yes: bruising from a fall on 20    New injury or illness: No    Upcoming surgery, procedure or cardioversion: No    Additional findings: None      PLAN:    Spoke with HOMERO Reyes the [facility] nurse, regarding INR result and instructed:     Warfarin Dosing Instructions: Continue your current warfarin dose 2 mg on MWF and 3 mg all other days    Instructed patient to follow up no later than: 1 week  Orders given to  Homecare nurse/facility to recheck    Education provided: None required      HOMERO Reyes nurse, verbalizes understanding and agrees to warfarin dosing plan.    Instructed to call the Anticoagulation Clinic for any changes, questions or concerns. (#[phone])        OBJECTIVE:  INR   Date Value Ref Range Status   2020 2.3 (A) 0.90 - 1.10 Final         No question data found.  Anticoagulation Summary  As of 2020    INR goal:   2.0-3.0   TTR:   74.4 % (2.4 mo)   INR used for dosin.3 (2020)   Warfarin maintenance plan:   2 mg (1 mg x 2) every Mon, Wed, Fri; 3 mg (1 mg x 3) all other days   Full warfarin instructions:   2 mg every Mon, Wed, Fri; 3 mg all other days   Weekly warfarin total:   18 mg   Plan last modified:   Michelle Alvarez RN (3/31/2020)   Next INR check:   6/15/2020   Priority:   High   Target end date:   3/11/2021    Indications    Paroxysmal atrial fibrillation (H) [I48.0]  Atrial fibrillation  unspecified type (H) [I48.91]  History of DVT (deep vein thrombosis) [Z86.718]  CVA (cerebral vascular accident) (H) (Resolved) [I63.9]              Anticoagulation Episode Summary     INR check location:       Preferred lab:       Send INR reminders to:   GUSTAVO FARAH    Comments:   History of CVA 2017 s/p endarterectomy in 2017 Previous clipping of aneurysm. [facility]. Lives at [facility]. Facility lab days are MON/THURS. Fax orders to [phone] Phone: [phone]      Anticoagulation Care Providers     Provider Role Specialty Phone number    Junie Estrella APRN CNP Responsible Nurse Practitioner - Gerontology [phone]

## 2020-06-08 NOTE — PROGRESS NOTES
[facility]  INITIAL VISIT NOTE  June 8, 2020    PRIMARY CARE PROVIDER AND CLINIC:  Junie Estrella [address] [address] / [facility] [address]    Chief Complaint   Patient presents with     Hospital F/U       HPI:    Jazmin Clifton is a 87 year old  (12/30/1932) female who [facility], 2020 for an initial visit. Medical history is notable for CAD, HTN, COPD, atrial fibrillation, CVA, lumbar stenosis s/p L1-L2 bilateral laminectomy and medial facetectomy. She was hospitalized at [facility] from 6/1/20 to 6/5/20 where she presented after a fall in her [place] apartment. She lost balance after going to the bathroom. Imaging negative for fractures. She was hypoxic to 87% in the ER. CT chest with some ground glass opacities with recommended repeat imaging in 3 months. Hypoxia felt secondary to hypoventilation from narcotics. COVID19 PCR negative x2 (6/2 and 6/4). She was admitted to this facility for medical management and rehab.     Today, Ms. Clifton is seen in her room just before breakfast. She needed some help getting the jelly and PB containers open. She seemed a little more confused vs quiet than I remember her. Has pain across her shoulders and across her low back. The biofreeze helps. No chest pain or dyspnea. No abdominal pain, diarrhea or constipation.     CODE STATUS:   DNR / DNI    ALLERGIES:     Allergies   Allergen Reactions     Accupril Other (See Comments)     Dizziness       Ace Inhibitors Other (See Comments)     Accupril - dizziness     Augmentin Unknown     Blood-Group Specific Substance Other (See Comments)     Patient has a Non-specific antibody. Blood Product orders may be delayed.  Draw one red top and two purple top tubes for ALL Type and Screen/ Type and Crossmatch orders.      Levofloxacin Other (See Comments) and Muscle Pain (Myalgia)     Pt prescribed ciprofloxacin in Jan 2018 (no rxn documented)         Morphine Other (See  "Comments)     hallucinations     Nitrofurantoin Nausea and Vomiting     Norvasc [Amlodipine Besylate] Swelling     Leg swelling       Quinapril Other (See Comments)     Hypertension. 3.29.18 - Takes and tolerates lisinopril           PAST MEDICAL HISTORY:   Past Medical History:   Diagnosis Date     ABDOMINAL PAIN GENERALIZED 3/15/2006    1 month of abdominal pain that bryn radiates into her back and chest.  Pt was hospitilzed 2x for the pain at [facility] --pt hopsitized for 3 days.  Rcords not ab=vailable.  She was told either \" twisted intestine or blockage of bowel.  Pt feels it is the hiatal hernia.  Pt hospitilized again a second time  A month ago.  Ct scans x 2 showed \" nothing.\"  Pt had a barium and xrays.  Pt sa     Abdominal pain, generalized 3/15/2006    1 month of abdominal pain that bryn radiates into her back and chest.  Pt was hospitilzed 2x for the pain at [facility] --pt hopsitized for 3 days.  Rcords not ab=vailable.  She was told either \" twisted intestine or blockage of bowel.  Pt feels it is the hiatal hernia.  Pt hospitilized again a second time  A month ago.  Ct scans x 2 showed \" nothing.\"  Pt had a barium and xrays.  Pt sa     Atherosclerosis of renal artery (H)     Left renal artery stenosis     BENIGN HYPERTENSION 5/1/2006 5/1/2006   Increase catapres to 0.3 mg and decrease clonidine to 0.1 mg daily.  Recheck bp in 1 month.      Benign neoplasm of scalp and skin of neck     Seborrheic keratosis     Cerebral aneurysm, nonruptured     Cerebral Aneurysm     Congestive heart failure (H)      Depressive disorder, not elsewhere classified     Depression     Esophageal reflux     Gastroesophageal Reflux Disease     Female stress incontinence      Gastrointestinal malfunction arising from mental factors     Dyspepsia     Generalized osteoarthrosis, unspecified site     DJD-chronic back pain     Herpes zoster dermatitis of eyelid      Insomnia, unspecified      Lumbago     " Chronic back pain     Other chest pain     Atypical Chest Pain     Other specified cardiac dysrhythmias(427.89)     Bradycardia, improved on lower dose beta blockers      Rectocele     Grade 3     Unspecified disorder of kidney and ureter     Renal insufficiency     Unspecified essential hypertension        PAST SURGICAL HISTORY:   Past Surgical History:   Procedure Laterality Date     CHOLECYSTECTOMY, LAPOROSCOPIC  1997    Cholecystectomy, Laparoscopic     CYSTOSCOPY, BIOPSY URETHRA N/A 6/10/2019    Procedure: Cystoscopy With Biopsy, Removal Of Urethral Lesion;  Surgeon: Yesy Fong MD;  Location: [facility] OR     ENDARTERECTOMY CAROTID Right 8/31/2017    Procedure: ENDARTERECTOMY CAROTID;  RIGHT CAROTID ENDARTERECTOMY WITH EEG;  Surgeon: Hilario Parry MD;  Location: [facility] OR     HYSTERECTOMY, RAYMOND  1982    oophorectomy,RAYMOND     SURGICAL HISTORY OF -       Laminectomy x 3     SURGICAL HISTORY OF -       MCA Aneurysm repair     SURGICAL HISTORY OF -   1996    Bladder suspension (Bladder Repair x2)     SURGICAL HISTORY OF -       Bilateral Hand Surgeries for Arthritis x2     SURGICAL HISTORY OF -       Repair of a Cerebral Aneurysm     URETHROPLASTY N/A 6/10/2019    Procedure: Urethral Reconstruction;  Surgeon: Yesy Fong MD;  Location: [facility] OR       FAMILY HISTORY:   Family History   Problem Relation Age of Onset     Cancer Mother         stomache     Cerebrovascular Disease Father      Heart Disease Father      Cancer Brother         lymphoma     Eye Disorder Son      Asthma Son      Diabetes Son      Gastrointestinal Disease Son         no control over bladder or bowels     C.A.D. No family hx of      Hypertension No family hx of      Breast Cancer No family hx of      Cancer - colorectal No family hx of      Prostate Cancer No family hx of        SOCIAL HISTORY:   [place]     MEDICATIONS:  Current Outpatient Medications   Medication Sig Dispense Refill     acetaminophen  (TYLENOL) 500 MG tablet Take 1,000 mg by mouth 3 times daily       atorvastatin (LIPITOR) 40 MG tablet TAKE 1 TABLET BY MOUTH ONCE DAILY 28 tablet PRN     benzocaine (ORAJEL/ANBESOL) 10 % gel Take by mouth 4 times daily as needed for moderate pain       bisacodyl (DULCOLAX) 10 MG suppository Place 10 mg rectally daily as needed for constipation       BREO ELLIPTA 100-25 MCG/INH inhaler INHALE 1 PUFF BY MOUTH ONCE DAILY (Patient taking differently: Inhale 1 puff into the lungs daily ) 1 Inhaler 97     calcium carbonate (TUMS) 500 MG chewable tablet Take 1 chew tab by mouth 3 times daily as needed       DULoxetine (CYMBALTA) 30 MG capsule Take 30 mg by mouth At Bedtime       DULoxetine (CYMBALTA) 60 MG capsule TAKE 1 CAPSULE BY MOUTH EVERY MORNING (Patient taking differently: Take 60 mg by mouth daily ) 31 capsule PRN     ferrous sulfate (FEROSUL) 325 (65 Fe) MG tablet Take 1 tablet (325 mg) by mouth daily 30 tablet 1     furosemide (LASIX) 20 MG tablet TAKE 1 TABLET BY MOUTH ONCE DAILY 30 tablet 98     gabapentin (NEURONTIN) 100 MG capsule Take 100 mg by mouth daily       HYDROmorphone (DILAUDID) 2 MG tablet TAKE 1 TABLET BY MOUTH TWICE DAILY AS NEEDED FOR SEVERE PAIN 12 tablet 0     hypromellose-dextran (GENTEAL TEARS) 0.1-0.3 % ophthalmic solution Place 1 drop into both eyes every 6 hours as needed for dry eyes       levalbuterol (XOPENEX) 1.25 MG/3ML neb solution Take 1 ampule by nebulization every 4 hours as needed for shortness of breath / dyspnea or wheezing And every 4 hours PRN       Lidocaine (LIDOCARE) 4 % Patch Place 1 patch onto the skin daily To desired area (shoulder or hip). On for 12hrs, off for 12hrs 30 patch 11     lisinopril (PRINIVIL/ZESTRIL) 20 MG tablet Take 1 tablet (20 mg) by mouth daily 30 tablet 11     Menthol, Topical Analgesic, (BIOFREEZE) 4 % GEL Externally apply topically 4 times daily as needed To left shoulder and right hip       NIFEdipine ER (ADALAT CC) 60 MG 24 hr tablet Take 60 mg  by mouth daily        nystatin (MYCOSTATIN) 434445 UNIT/GM external powder Apply topically 3 times daily 1 Bottle 97     omeprazole (PRILOSEC) 40 MG DR capsule TAKE 1 CAPSULE BY MOUTH ONCE DAILY 28 capsule PRN     ondansetron (ZOFRAN) 4 MG tablet Take 4 mg by mouth every 6 hours as needed for nausea       oxyCODONE (OXYCONTIN) 10 MG 12 hr tablet TAKE ONE TABLET BY MOUTH EVERY 12 HOURS DO NOT CRUSH 12 tablet 0     predniSONE (DELTASONE) 5 MG tablet TAKE 1 TABLET BY MOUTH ONCE DAILY 31 tablet PRN     tiZANidine (ZANAFLEX) 2 MG tablet Take 2 mg by mouth every 8 hours as needed        VITAMIN D3 25 MCG (1000 UT) tablet TAKE 1 TABLET BY MOUTH ONCE DAILY (Patient taking differently: Take 25 mcg by mouth daily ) 30 tablet 97     warfarin ANTICOAGULANT (COUMADIN) 1 MG tablet Take 2 tabs (2 mg) every MWF, and take 3 tabs (3 mg) all other days of the week or as directed by the Anticoagulation Clinic 90 tablet 1     Blood Glucose Monitoring Suppl CORY 2 times daily Call nurse for further directions if blood glucose is less than 80 or greater than 250       DOK PLUS 50-8.6 MG tablet TAKE 1 TABLET BY MOUTH TWICE DAILY (Patient taking differently: Take 1 tablet by mouth 2 times daily ) 60 tablet 98     Emollient (MOISTURIZING LOTION EX) Externally apply topically 2 times daily Bilateral lower extremeties           ROS:  10 point ROS neg other than the symptoms noted above in the HPI.    PHYSICAL EXAM:  BP (!) 146/77   Pulse 81   Temp 98.4  F (36.9  C)   Resp 18   Wt 79.7 kg (175 lb 12.8 oz)   SpO2 93%   BMI 31.14 kg/m     Gen: sitting in wheelchair, alert, cooperative and in no acute distress  HEENT: normocephalic; oropharynx clear  Card: RRR, S1, S2, no murmurs  Resp: lungs clear to auscultation bilaterally, no crackles or wheezes  GI: abdomen soft, not-tender  MSK: normal muscle tone, some dependent LE edema  Neuro: CX II-XII grossly in tact; ROM in all four extremities grossly in tact  Psych: alert and oriented to self  and general situation; flat affect    LABORATORY/IMAGING DATA:  Reviewed as per Epic    ASSESSMENT/PLAN:    Fall  Physical Deconditioning  In setting of hospitalization and underlying medical conditions.  -- ongoing PT/OT    CAD, CHF, HTN,  HLD  SBPs 12-s130s. Weight stable around 176 lbs.   -- atorvastatin 40 mg qhs, furosemide 20 mg daily, lisinopril 20 mg daily and nifedipine 60 mg daily   -- follow BPs, weights, clinical volume status    Paroxysmal Atrial Fibrillation  -- warfarin, goal INR 2-3     COPD   No signs of exacerbation. Lungs clear.   -- continues on fluticasone-vilanterol 100-25 mcg daily, prednisone 5 mg daily and levalbuterol nebs PRN  -- CT chest w/ ground glass opacities with recommendation for follow up imaging in 3 months     Dementia Without Behavioral Disturbance  Lives in AL facility. CPT 4.7/5.6 during June 2018 TCU stay. Nursing notes she seems more confused than prior TCU stays.   -- OT following     Depression, Anxiety  Mood and spirits good today  -- continues on duloxetine 60 mg qam and 30 mg at bedtime   -- ongoing supportive cares     Fe Deficiency Anemia   Baseline Hgb 9-10 range. Hgb 9.8 on 6/3.   -- FeSO4 325 mg daily  -- repeat Hgb in a week or so     CVA  By history.   -- atorvastatin 40 mg qhs     Chronic Pain Syndrome   Lumbar Spinal Stenosis s/p L1 L2 Bilateral Laminectomy and Medial Facetectomy (1/9/19)  Today with pain across shoulders and low back.   -- analgesia with APAP 1000 mg TID, Biofreeze QID PRN, duloxetine 60 mg qam and 30 mg at bedtime, gabapentin 100 mg daily, hydromorphone 2 mg BID PRN, lidoderm patch, OxyContin 10 mg q12 and tizanidine 2 mg q8h PRN  -- she has followed in pain clinic - follow up with them as scheduled     Osteoporosis  -- calcium and Vit D supplement      GERD  -- continues on omeprazole 40 mg daily    Slow Transit Constipation  -- Senna-S 50-8.6 1 tab BID  -- adjust bowel regimen as needed         Electronically signed by:  Ana Cristina Yu,  MD

## 2020-06-09 NOTE — PROGRESS NOTES
"[facility]   Jazmin Clifton is being evaluated via a billable video visit due to the restrictions of the Covid-19 pandemic.   The patient has been notified of following:  \"This video visit will be conducted via a call between you and your provider. We have found that certain health care needs can be provided without the need for an in-person physical exam.  This service lets us provide the care you need with a video conversation. If during the course of the call the provider feels a video visit is not appropriate, you will not be charged for this service.\"   The provider has received verbal consent for a Video Visit from the patient or first contact? Yes  Patient  or facility staff would like the video invitation sent by: Send to e-mail at: [email or web address]  Video Start Time: 1118    [facility] Medical Record Number:  [id number]  Place of Location at the time of visit: [facility]  Chief Complaint   Patient presents with     RECHECK     HPI:  Jazmin Clifton  is a 87 year old (12/30/1932), who is being seen today for a visit.  HPI information obtained from: facility chart records, facility staff, patient report and [facility] chart review. Today's concern is:     Cellulitis of left lower extremity  Skin tear of left lower leg without complication, subsequent encounter  Paroxysmal atrial fibrillation (H)   Patient recently hospitalized after fall at assisted living facility.  Imaging negative for fracture.  Did have a skin tear to left leg distal to the knee.  Asked to be seen by staff today as nursing report increased swelling warmth and redness to left leg between ankle and knee.  Patient reports pain has been getting worse over the past couple of days, and in leg now feels that it is \"on fire.\"  Nursing reports has significantly changed in past 48 hours.  Skin tear appears scabbed over, and nursing staff report moderate amount of bloody drainage, but no pus.  She does have chronic edema to " "legs, but was unable to tolerate her compression wraps due to discomfort prior to hospitalization.  She denies any fevers or chills.  Reports general aches which is her \"normal.\"  Denies any chest pain shortness of breath, or cough.    Past Medical and Surgical History reviewed in Epic today.  MEDICATIONS:    Current Outpatient Medications   Medication Sig Dispense Refill     cephALEXin (KEFLEX) 500 MG capsule Take 1 capsule (500 mg) by mouth 2 times daily for 7 days 14 capsule 0     DULoxetine (CYMBALTA) 60 MG capsule Take 1 capsule (60 mg) by mouth daily       lactobacillus rhamnosus, GG, (CULTURELL) capsule Take 1 capsule by mouth 2 times daily for 14 days 28 capsule 0     acetaminophen (TYLENOL) 500 MG tablet Take 1,000 mg by mouth 3 times daily       atorvastatin (LIPITOR) 40 MG tablet TAKE 1 TABLET BY MOUTH ONCE DAILY 28 tablet PRN     benzocaine (ORAJEL/ANBESOL) 10 % gel Take by mouth 4 times daily as needed for moderate pain       bisacodyl (DULCOLAX) 10 MG suppository Place 10 mg rectally daily as needed for constipation       Blood Glucose Monitoring Suppl CORY 2 times daily Call nurse for further directions if blood glucose is less than 80 or greater than 250       BREO ELLIPTA 100-25 MCG/INH inhaler INHALE 1 PUFF BY MOUTH ONCE DAILY (Patient taking differently: Inhale 1 puff into the lungs daily ) 1 Inhaler 97     calcium carbonate (TUMS) 500 MG chewable tablet Take 1 chew tab by mouth 3 times daily as needed       DOK PLUS 50-8.6 MG tablet TAKE 1 TABLET BY MOUTH TWICE DAILY (Patient taking differently: Take 1 tablet by mouth 2 times daily ) 60 tablet 98     DULoxetine (CYMBALTA) 30 MG capsule Take 30 mg by mouth At Bedtime       Emollient (MOISTURIZING LOTION EX) Externally apply topically 2 times daily Bilateral lower extremeties       ferrous sulfate (FEROSUL) 325 (65 Fe) MG tablet Take 1 tablet (325 mg) by mouth daily 30 tablet 1     furosemide (LASIX) 20 MG tablet TAKE 1 TABLET BY MOUTH ONCE DAILY " 30 tablet 98     gabapentin (NEURONTIN) 100 MG capsule Take 100 mg by mouth daily       HYDROmorphone (DILAUDID) 2 MG tablet TAKE 1 TABLET BY MOUTH TWICE DAILY AS NEEDED FOR SEVERE PAIN 12 tablet 0     hypromellose-dextran (GENTEAL TEARS) 0.1-0.3 % ophthalmic solution Place 1 drop into both eyes every 6 hours as needed for dry eyes       levalbuterol (XOPENEX) 1.25 MG/3ML neb solution Take 1 ampule by nebulization every 4 hours as needed for shortness of breath / dyspnea or wheezing And every 4 hours PRN       Lidocaine (LIDOCARE) 4 % Patch Place 1 patch onto the skin daily To desired area (shoulder or hip). On for 12hrs, off for 12hrs 30 patch 11     lisinopril (PRINIVIL/ZESTRIL) 20 MG tablet Take 1 tablet (20 mg) by mouth daily 30 tablet 11     Menthol, Topical Analgesic, (BIOFREEZE) 4 % GEL Externally apply topically 4 times daily as needed To left shoulder and right hip       NIFEdipine ER (ADALAT CC) 60 MG 24 hr tablet Take 60 mg by mouth daily        nystatin (MYCOSTATIN) 102380 UNIT/GM external powder Apply topically 3 times daily 1 Bottle 97     omeprazole (PRILOSEC) 40 MG DR capsule TAKE 1 CAPSULE BY MOUTH ONCE DAILY 28 capsule PRN     ondansetron (ZOFRAN) 4 MG tablet Take 4 mg by mouth every 6 hours as needed for nausea       oxyCODONE (OXYCONTIN) 10 MG 12 hr tablet TAKE ONE TABLET BY MOUTH EVERY 12 HOURS DO NOT CRUSH 12 tablet 0     predniSONE (DELTASONE) 5 MG tablet TAKE 1 TABLET BY MOUTH ONCE DAILY 31 tablet PRN     tiZANidine (ZANAFLEX) 2 MG tablet Take 2 mg by mouth every 8 hours as needed        VITAMIN D3 25 MCG (1000 UT) tablet TAKE 1 TABLET BY MOUTH ONCE DAILY (Patient taking differently: Take 25 mcg by mouth daily ) 30 tablet 97     warfarin ANTICOAGULANT (COUMADIN) 1 MG tablet Take 2 tabs (2 mg) every MWF, and take 3 tabs (3 mg) all other days of the week or as directed by the Anticoagulation Clinic 90 tablet 1     REVIEW OF SYSTEMS: 4 point ROS including Respiratory, CV, GI and , other than  that noted in the HPI,  is negative  Objective: /78   Pulse 76   Temp 97.6  F (36.4  C)   Resp 28   Wt 79.8 kg (176 lb)   SpO2 95%   BMI 31.18 kg/m    Limited visit exam done given COVID-19 precautions.   GENERAL APPEARANCE:  Alert, in no distress, cooperative  RESP:  no respiratory distress, On room air  CV:  peripheral edema 3+ in Left lower extremity, 2+ in the right lower extremity  SKIN:  Skin tear with scab wound bed, bloody drainage noted on dressing.  Large area of dark red skin discoloration surrounding skin tear and extending throughout most of the calf and shin area  NEURO:   Speech clear, no facial droop, moves all extremities purposefully  PSYCH:  oriented X 3, normal insight, judgement and memory, affect and mood normal      Nursing reports: Tenderness and significant warmth to left lower extremity between knee and ankle.  Bloody drainage from left skin tear, lungs clear  Labs:   Recent labs in [facility] reviewed by me today.  and   Most Recent 3 CBC's:  Recent Labs   Lab Test 06/03/20  0422 06/01/20  2346 03/19/20  0740   WBC 6.2 7.8 9.8   HGB 9.8* 12.3 10.3*   MCV 93 93 90    236 309     Most Recent 3 BMP's:  Recent Labs   Lab Test 06/03/20  0422 06/01/20  2346 03/19/20  0740    144 142   POTASSIUM 4.1 4.2 3.6   CHLORIDE 110* 110* 109   CO2 30 27 28   BUN 27 32* 20   CR 1.11* 1.47* 0.97   ANIONGAP 4 7 5   BLAIR 8.8 9.8 9.4   * 114* 101*     Most Recent 3 INR's:  Recent Labs   Lab Test 06/08/20 06/05/20  0602 06/04/20  0423   INR 2.3* 2.23* 2.17*     Estimated Creatinine Clearance: 35.7 mL/min (A) (based on SCr of 1.11 mg/dL (H)).    ASSESSMENT/PLAN:  (L03.116) Cellulitis of left lower extremity  (primary encounter diagnosis)  (A22.698I) Skin tear of left lower leg without complication, subsequent encounter  Comment: Given red discoloration, warmth to area and noted skin tear suspect patient has cellulitis.  Does have worsening edema and pain in the area.  History of C.  Difficile last October.  She is afebrile, no signs or symptoms of sepsis.  Plan: We will start Keflex 500 mg every 12 hours x7 days.  Will add probiotic twice daily x7 days.  Staff to monitor closely for diarrhea and update provider with any concerns.  Given history of CKD will check BMP and CBC on next lab day.  Farrow wraps or other compression as tolerated to help control edema.  Elevate legs when at rest.  Continue Lasix at current dose.    (I48.0) Paroxysmal atrial fibrillation (H)  Comment: Is anticoagulated on Coumadin.  Heart rate appears controlled 60s to 80s currently.  Plan: Check INR on 612 due to starting antibiotics, following with [facility].      Orders written and e-mailed to TCU nurse manager  1. Keflex 500mg PO  q 12 x 7 days DX: cellulitis  2. Culturelle or pharmacy equivalent (10 billion cell count) 1 tab PO BID x 14 days DX: cellulitis  3. Update provider if develops watery diarrhea - has history of c.diff.   4. Check INR on 6/12 d/t starting Abx  5. BMP, CBC on 6/11 DX: cellulitis      Electronically signed by:  MARY Gómez CNP     Video-Visit Details  Type of service:  Video Visit  Video End Time (time video stopped): 1128  Distant Location (provider location):  [facility]       Disclaimer:  This note consists of symbols derived from keyboarding, dictation, and/or voice recognition software.  As a result, there may be errors in the script that have gone undetected.  Please consider this when interpreting information found in the chart.

## 2020-06-09 NOTE — LETTER
"    6/9/2020        RE: Jazmin Clifton  [address] [place] [address]  [facility] [place] [address]        [facility]   Jazmin Clifton is being evaluated via a billable video visit due to the restrictions of the Covid-19 pandemic.   The patient has been notified of following:  \"This video visit will be conducted via a call between you and your provider. We have found that certain health care needs can be provided without the need for an in-person physical exam.  This service lets us provide the care you need with a video conversation. If during the course of the call the provider feels a video visit is not appropriate, you will not be charged for this service.\"   The provider has received verbal consent for a Video Visit from the patient or first contact? Yes  Patient  or facility staff would like the video invitation sent by: Send to e-mail at: [email or web address]  Video Start Time: 1118    [facility] Medical Record Number:  [id number]  Place of Location at the time of visit: [facility]  Chief Complaint   Patient presents with     RECHECK     HPI:  Jazmin Clifton  is a 87 year old (12/30/1932), who is being seen today for a visit.  HPI information obtained from: facility chart records, facility staff, patient report and [facility] Epic chart review. Today's concern is:     Cellulitis of left lower extremity  Skin tear of left lower leg without complication, subsequent encounter  Paroxysmal atrial fibrillation (H)   Patient recently hospitalized after fall at assisted living facility.  Imaging negative for fracture.  Did have a skin tear to left leg distal to the knee.  Asked to be seen by staff today as nursing report increased swelling warmth and redness to left leg between ankle and knee.  Patient reports pain has been getting worse over the past couple of days, and in leg now feels that it is \"on fire.\"  Nursing reports has significantly changed in past 48 hours.  Skin tear appears scabbed over, and " "nursing staff report moderate amount of bloody drainage, but no pus.  She does have chronic edema to legs, but was unable to tolerate her compression wraps due to discomfort prior to hospitalization.  She denies any fevers or chills.  Reports general aches which is her \"normal.\"  Denies any chest pain shortness of breath, or cough.    Past Medical and Surgical History reviewed in Epic today.  MEDICATIONS:    Current Outpatient Medications   Medication Sig Dispense Refill     cephALEXin (KEFLEX) 500 MG capsule Take 1 capsule (500 mg) by mouth 2 times daily for 7 days 14 capsule 0     DULoxetine (CYMBALTA) 60 MG capsule Take 1 capsule (60 mg) by mouth daily       lactobacillus rhamnosus, GG, (CULTURELL) capsule Take 1 capsule by mouth 2 times daily for 14 days 28 capsule 0     acetaminophen (TYLENOL) 500 MG tablet Take 1,000 mg by mouth 3 times daily       atorvastatin (LIPITOR) 40 MG tablet TAKE 1 TABLET BY MOUTH ONCE DAILY 28 tablet PRN     benzocaine (ORAJEL/ANBESOL) 10 % gel Take by mouth 4 times daily as needed for moderate pain       bisacodyl (DULCOLAX) 10 MG suppository Place 10 mg rectally daily as needed for constipation       Blood Glucose Monitoring Suppl CORY 2 times daily Call nurse for further directions if blood glucose is less than 80 or greater than 250       BREO ELLIPTA 100-25 MCG/INH inhaler INHALE 1 PUFF BY MOUTH ONCE DAILY (Patient taking differently: Inhale 1 puff into the lungs daily ) 1 Inhaler 97     calcium carbonate (TUMS) 500 MG chewable tablet Take 1 chew tab by mouth 3 times daily as needed       DOK PLUS 50-8.6 MG tablet TAKE 1 TABLET BY MOUTH TWICE DAILY (Patient taking differently: Take 1 tablet by mouth 2 times daily ) 60 tablet 98     DULoxetine (CYMBALTA) 30 MG capsule Take 30 mg by mouth At Bedtime       Emollient (MOISTURIZING LOTION EX) Externally apply topically 2 times daily Bilateral lower extremeties       ferrous sulfate (FEROSUL) 325 (65 Fe) MG tablet Take 1 tablet (325 " mg) by mouth daily 30 tablet 1     furosemide (LASIX) 20 MG tablet TAKE 1 TABLET BY MOUTH ONCE DAILY 30 tablet 98     gabapentin (NEURONTIN) 100 MG capsule Take 100 mg by mouth daily       HYDROmorphone (DILAUDID) 2 MG tablet TAKE 1 TABLET BY MOUTH TWICE DAILY AS NEEDED FOR SEVERE PAIN 12 tablet 0     hypromellose-dextran (GENTEAL TEARS) 0.1-0.3 % ophthalmic solution Place 1 drop into both eyes every 6 hours as needed for dry eyes       levalbuterol (XOPENEX) 1.25 MG/3ML neb solution Take 1 ampule by nebulization every 4 hours as needed for shortness of breath / dyspnea or wheezing And every 4 hours PRN       Lidocaine (LIDOCARE) 4 % Patch Place 1 patch onto the skin daily To desired area (shoulder or hip). On for 12hrs, off for 12hrs 30 patch 11     lisinopril (PRINIVIL/ZESTRIL) 20 MG tablet Take 1 tablet (20 mg) by mouth daily 30 tablet 11     Menthol, Topical Analgesic, (BIOFREEZE) 4 % GEL Externally apply topically 4 times daily as needed To left shoulder and right hip       NIFEdipine ER (ADALAT CC) 60 MG 24 hr tablet Take 60 mg by mouth daily        nystatin (MYCOSTATIN) 508761 UNIT/GM external powder Apply topically 3 times daily 1 Bottle 97     omeprazole (PRILOSEC) 40 MG DR capsule TAKE 1 CAPSULE BY MOUTH ONCE DAILY 28 capsule PRN     ondansetron (ZOFRAN) 4 MG tablet Take 4 mg by mouth every 6 hours as needed for nausea       oxyCODONE (OXYCONTIN) 10 MG 12 hr tablet TAKE ONE TABLET BY MOUTH EVERY 12 HOURS DO NOT CRUSH 12 tablet 0     predniSONE (DELTASONE) 5 MG tablet TAKE 1 TABLET BY MOUTH ONCE DAILY 31 tablet PRN     tiZANidine (ZANAFLEX) 2 MG tablet Take 2 mg by mouth every 8 hours as needed        VITAMIN D3 25 MCG (1000 UT) tablet TAKE 1 TABLET BY MOUTH ONCE DAILY (Patient taking differently: Take 25 mcg by mouth daily ) 30 tablet 97     warfarin ANTICOAGULANT (COUMADIN) 1 MG tablet Take 2 tabs (2 mg) every MWF, and take 3 tabs (3 mg) all other days of the week or as directed by the Anticoagulation  Clinic 90 tablet 1     REVIEW OF SYSTEMS: 4 point ROS including Respiratory, CV, GI and , other than that noted in the HPI,  is negative  Objective: /78   Pulse 76   Temp 97.6  F (36.4  C)   Resp 28   Wt 79.8 kg (176 lb)   SpO2 95%   BMI 31.18 kg/m    Limited visit exam done given COVID-19 precautions.   GENERAL APPEARANCE:  Alert, in no distress, cooperative  RESP:  no respiratory distress, On room air  CV:  peripheral edema 3+ in Left lower extremity, 2+ in the right lower extremity  SKIN:  Skin tear with scab wound bed, bloody drainage noted on dressing.  Large area of dark red skin discoloration surrounding skin tear and extending throughout most of the calf and shin area  NEURO:   Speech clear, no facial droop, moves all extremities purposefully  PSYCH:  oriented X 3, normal insight, judgement and memory, affect and mood normal      Nursing reports: Tenderness and significant warmth to left lower extremity between knee and ankle.  Bloody drainage from left skin tear, lungs clear  Labs:   Recent labs in [facility] reviewed by me today.  and   Most Recent 3 CBC's:  Recent Labs   Lab Test 06/03/20  0422 06/01/20  2346 03/19/20  0740   WBC 6.2 7.8 9.8   HGB 9.8* 12.3 10.3*   MCV 93 93 90    236 309     Most Recent 3 BMP's:  Recent Labs   Lab Test 06/03/20  0422 06/01/20  2346 03/19/20  0740    144 142   POTASSIUM 4.1 4.2 3.6   CHLORIDE 110* 110* 109   CO2 30 27 28   BUN 27 32* 20   CR 1.11* 1.47* 0.97   ANIONGAP 4 7 5   BLAIR 8.8 9.8 9.4   * 114* 101*     Most Recent 3 INR's:  Recent Labs   Lab Test 06/08/20 06/05/20  0602 06/04/20  0423   INR 2.3* 2.23* 2.17*     Estimated Creatinine Clearance: 35.7 mL/min (A) (based on SCr of 1.11 mg/dL (H)).    ASSESSMENT/PLAN:  (L03.116) Cellulitis of left lower extremity  (primary encounter diagnosis)  (S81.506O) Skin tear of left lower leg without complication, subsequent encounter  Comment: Given red discoloration, warmth to area and noted skin tear  suspect patient has cellulitis.  Does have worsening edema and pain in the area.  History of C. Difficile last October.  She is afebrile, no signs or symptoms of sepsis.  Plan: We will start Keflex 500 mg every 12 hours x7 days.  Will add probiotic twice daily x7 days.  Staff to monitor closely for diarrhea and update provider with any concerns.  Given history of CKD will check BMP and CBC on next lab day.  Farrow wraps or other compression as tolerated to help control edema.  Elevate legs when at rest.  Continue Lasix at current dose.    (I48.0) Paroxysmal atrial fibrillation (H)  Comment: Is anticoagulated on Coumadin.  Heart rate appears controlled 60s to 80s currently.  Plan: Check INR on 612 due to starting antibiotics, following with [facility].      Orders written and e-mailed to TCU nurse manager  1. Keflex 500mg PO  q 12 x 7 days DX: cellulitis  2. Culturelle or pharmacy equivalent (10 billion cell count) 1 tab PO BID x 14 days DX: cellulitis  3. Update provider if develops watery diarrhea - has history of c.diff.   4. Check INR on 6/12 d/t starting Abx  5. BMP, CBC on 6/11 DX: cellulitis      Electronically signed by:  MARY Gómez CNP     Video-Visit Details  Type of service:  Video Visit  Video End Time (time video stopped): 1128  Distant Location (provider location):  [facility]             Sincerely,        MARY Gómez CNP

## 2020-06-11 NOTE — PROGRESS NOTES
[facility] Care Coordination Contact    No Letter Received: 60 day tracking of letter complete, no letter received from Yessy rutherford [place].. Tracking discontinued.     Arlen Lang  Care Management Specialist  [facility]  [phone]

## 2020-06-12 NOTE — PROGRESS NOTES
ANTICOAGULATION MANAGEMENT     Patient Name:  Jazmin Clifton  Date:  2020    ASSESSMENT /SUBJECTIVE:    Today's INR result of 2.4 is therapeutic. Goal INR of 2.0-3.0      Warfarin dose taken: Warfarin taken as previously instructed    Diet: No new diet changes affecting INR    Medication changes/ interactions: Interaction between keflex and warfarin may be affecting INR -20    Previous INR: Therapeutic     S/S of bleeding or thromboembolism: No    New injury or illness: Yes: Cellulitis    Upcoming surgery, procedure or cardioversion: No    Additional findings: None      PLAN:    Spoke with [facility] nurse, regarding INR result and instructed:     Warfarin Dosing Instructions: Continue your current warfarin dose 2 mg MWF and 3 mg all other days    Instructed patient to follow up no later than: [id number]  Orders given to [facility] ([phone])    Education provided: None required      A[facility]CU nurse, verbalizes understanding and agrees to warfarin dosing plan.    Instructed to call the Anticoagulation Clinic for any changes, questions or concerns. (#[phone])        OBJECTIVE:  INR   Date Value Ref Range Status   2020 2.4 (A) 0.90 - 1.10 Final         No question data found.  Anticoagulation Summary  As of 2020    INR goal:   2.0-3.0   TTR:   75.7 % (2.6 mo)   INR used for dosin.4 (2020)   Warfarin maintenance plan:   2 mg (1 mg x 2) every Mon, Wed, Fri; 3 mg (1 mg x 3) all other days   Full warfarin instructions:   2 mg every Mon, Wed, Fri; 3 mg all other days   Weekly warfarin total:   18 mg   Plan last modified:   Michelle Alvarez RN (3/31/2020)   Next INR check:   2020   Priority:   High   Target end date:   3/11/2021    Indications    Paroxysmal atrial fibrillation (H) [I48.0]  Atrial fibrillation  unspecified type (H) [I48.91]  History of DVT (deep vein thrombosis) [Z86.718]  CVA (cerebral vascular accident) (H) (Resolved) [I63.9]             Anticoagulation  Episode Summary     INR check location:       Preferred lab:       Send INR reminders to:   GUSTAVO FARAH    Comments:   History of CVA 2017 s/p endarterectomy in 2017 Previous clipping of aneurysm. [facility]. Lives at [facility]. Facility lab days are MON/THURS. Fax orders to [phone] Phone: [phone]      Anticoagulation Care Providers     Provider Role Specialty Phone number    Junie Estrella APRN CNP Responsible Nurse Practitioner - Gerontology [phone]

## 2020-06-17 NOTE — PROGRESS NOTES
"[facility] DISCHARGE SUMMARY  Jazmin Clifton is being evaluated via a billable video visit due to the restrictions of the Covid-19 pandemic.   The patient has been notified of following:  \"This video visit will be conducted via a call between you and a [facility] provider. We have found that certain health care needs can be provided without the need for an in-person physical exam.  This service lets us provide the care you need with a video conversation. If during the course of the call the provider feels a video visit is not appropriate, you will not be charged for this service.\"   The provider has received verbal consent for a Video Visit from the patient or family/first contact? Yes  Patient or /facility staff would like the video invitation sent by: N/A   Video Start Time: 1324    PATIENT'S NAME: Jazmin Clifton  YOB: 1932  MEDICAL RECORD NUMBER:  [id number]  Place of Location at the time of visit: [facility]  PRIMARY CARE PROVIDER AND CLINIC RESPONSIBLE AFTER TRANSFER:   MARY Gómez CNP, [address] [place] [id number] ;  [facility] Provider   Transferring providers: MARY Gómez CNP, Dr. Gigi MD  Recent Hospitalization/ED:  [facility] stay 6/1/20 to 6/5/20.  Date of SNF Admission: June / 05 / 2020  Date of SNF (anticipated) Discharge: June / 23 / 2020  Discharged to: previous assisted living  Cognitive Scores: BIMS: 12/15  Physical Function: Wheelchair dependent and Pivot transfers  DME: Wheelchair  CODE STATUS/ADVANCE DIRECTIVES DISCUSSION:  DNR / DNI   ALLERGIES: Accupril; Ace inhibitors; Augmentin; Blood-group specific substance; Levofloxacin; Morphine; Nitrofurantoin; Norvasc [amlodipine besylate]; and Quinapril    DISCHARGE DIAGNOSIS/NURSING FACILITY COURSE:   From MD TCU admission note: Jazmin Clifton is a 87 year old  (12/30/1932) female [facility] initial " visit. Medical history is notable for CAD, HTN, COPD, atrial fibrillation, CVA, lumbar stenosis s/p L1-L2 bilateral laminectomy and medial facetectomy. She was hospitalized at [facility] [place] from 6/1/20 to 6/5/20 where she presented after a fall in her AL apartment. She lost balance after going to the bathroom. Imaging negative for fractures. She was hypoxic to 87% in the ER. CT chest with some ground glass opacities with recommended repeat imaging in 3 months. Hypoxia felt secondary to hypoventilation from narcotics. COVID19 PCR negative x2 (6/2 and 6/4). She was admitted to this facility for medical management and rehab.     Recurrent falls  Likely secondary to limited mobility and overall decline. Had care conference today with PT, SW, CC, Jazmin, and son. Discussed falls risks and concerns of patient returning to AL. She may benefit from LTC placement, but she is not currently interested in this. Discussed that she remains a high falls risk, and that falls could result in catastrophic injury or death. Has been recommended that she has assistance for all transfers, she was hesitant, but agreeable to have staff assist with transfers and toileting. She will have home PT, OT, and HHA as well as increased safety checks upon discharge back to [facility].  - HOME CARE NURSING REFERRAL    Paroxysmal atrial fibrillation (H)  History of DVT (deep vein thrombosis)  HR controlled 70's-90's. Anticoagulated on coumadin.  Discussed bleeding risk with patient given falls risk and coumadin use. Discussed that fall could result in traumatic bleeding including  head bleed that could be fatal. Also discussed stroke risk with stopping coumadin use. CHADs-vasc score currently 8, indicating a 7.2-12.2% risk of CVA in the next year. Jazmin stated that she would like to continue coumadin use despite bleeding risk. Coumadin managed by [facility].     DDD (degenerative disc disease), lumbar  Pain syndrome, chronic  Has been previously seen by  pain clinic. States only minimal pain today, only minimal use of PRN pain medications in TCU. Continues with APAP 1000 mg TID, Biofreeze QID PRN, duloxetine 60 mg qam and 30 mg at bedtime, gabapentin 100 mg daily, hydromorphone 2 mg BID PRN, lidoderm patch, OxyContin 10 mg q12 and tizanidine 2 mg q8h PRN  - HOME CARE NURSING REFERRAL    Cellulitis of left lower extremity  Skin tear of left lower leg without complication, subsequent encounter  Developed increased, swelling, pain, and erythema near skin tear on left shin. She was treated with 7 day course of keflex, symptoms improved, abx completed.     Coronary artery disease involving native coronary artery of native heart without angina pectoris  Essential hypertension  Hyperlipidemia, unspecified hyperlipidemia type  Congestive heart failure, unspecified HF chronicity, unspecified heart failure type (H)  's-150's recently in TCU. Weight stable ~175, though is down about 10lb in the past 6 months - ? If water weight. No concerns for exacerbation. Continues on atorvastatin 40 mg qhs, furosemide 20 mg daily, lisinopril 20 mg daily and nifedipine 60 mg daily     Chronic obstructive pulmonary disease, unspecified COPD type (H)  No concerns for exacerbation in TCU. Continues on fluticasone-vilanterol 100-25 mcg daily, prednisone 5 mg daily and levalbuterol nebs PRN. CT chest w/ ground glass opacities with recommendation for follow up imaging in 3 months  - HOME CARE NURSING REFERRAL    Depression with anxiety  Initially depressed as was unhappy to have fallen and be back in TCU, but mood has improved significantly during TCU stay. Continues on  duloxetine 60 mg qam and 30 mg at bedtime     Iron deficiency anemia, unspecified iron deficiency anemia type  Hgb stable, continues on iron supplemt    Slow transit constipation  Had constipation last week, now having normal BM's. Continues on senna-s.     Gastroesophageal reflux disease without esophagitis  Continue on  omeprazole    CKD (chronic kidney disease) stage 3, GFR 30-59 ml/min (H)  Recent Baseline 1.1-1.3, stable, see labs,.     Oral phase dysphagia  Likely due to ill fitting dentures, She was seen by SLP - recommended she continue on mechanical soft diet. She has follow-up with dentist on 6/25.       Past Medical History:  has a past medical history of ABDOMINAL PAIN GENERALIZED (3/15/2006), Abdominal pain, generalized (3/15/2006), Atherosclerosis of renal artery (H), BENIGN HYPERTENSION (5/1/2006), Benign neoplasm of scalp and skin of neck, Cerebral aneurysm, nonruptured, Congestive heart failure (H), Depressive disorder, not elsewhere classified, Esophageal reflux, Female stress incontinence, Gastrointestinal malfunction arising from mental factors, Generalized osteoarthrosis, unspecified site, Herpes zoster dermatitis of eyelid, Insomnia, unspecified, Lumbago, Other chest pain, Other specified cardiac dysrhythmias(427.89), Rectocele, Unspecified disorder of kidney and ureter, and Unspecified essential hypertension.  Discharge Medications:    Current Outpatient Medications   Medication Sig Dispense Refill     acetaminophen (TYLENOL) 500 MG tablet Take 1,000 mg by mouth 3 times daily       atorvastatin (LIPITOR) 40 MG tablet TAKE 1 TABLET BY MOUTH ONCE DAILY 28 tablet PRN     benzocaine (ORAJEL/ANBESOL) 10 % gel Take by mouth 4 times daily as needed for moderate pain       bisacodyl (DULCOLAX) 10 MG suppository Place 10 mg rectally daily as needed for constipation       Blood Glucose Monitoring Suppl CORY 2 times daily Call nurse for further directions if blood glucose is less than 80 or greater than 250       BREO ELLIPTA 100-25 MCG/INH inhaler INHALE 1 PUFF BY MOUTH ONCE DAILY (Patient taking differently: Inhale 1 puff into the lungs daily ) 1 Inhaler 97     calcium carbonate (TUMS) 500 MG chewable tablet Take 1 chew tab by mouth 3 times daily as needed       DOK PLUS 50-8.6 MG tablet TAKE 1 TABLET BY MOUTH TWICE  DAILY (Patient taking differently: Take 1 tablet by mouth 2 times daily ) 60 tablet 98     DULoxetine (CYMBALTA) 30 MG capsule Take 30 mg by mouth At Bedtime       DULoxetine (CYMBALTA) 60 MG capsule Take 1 capsule (60 mg) by mouth daily       Emollient (MOISTURIZING LOTION EX) Externally apply topically 2 times daily Bilateral lower extremeties       ferrous sulfate (FEROSUL) 325 (65 Fe) MG tablet Take 1 tablet (325 mg) by mouth daily 30 tablet 1     furosemide (LASIX) 20 MG tablet TAKE 1 TABLET BY MOUTH ONCE DAILY 30 tablet 98     gabapentin (NEURONTIN) 100 MG capsule Take 100 mg by mouth daily       HYDROmorphone (DILAUDID) 2 MG tablet TAKE 1 TABLET BY MOUTH TWICE DAILY AS NEEDED FOR SEVERE PAIN 12 tablet 0     hypromellose-dextran (GENTEAL TEARS) 0.1-0.3 % ophthalmic solution Place 1 drop into both eyes every 6 hours as needed for dry eyes       lactobacillus rhamnosus, GG, (CULTURELL) capsule Take 1 capsule by mouth 2 times daily for 14 days 28 capsule 0     levalbuterol (XOPENEX) 1.25 MG/3ML neb solution Take 1 ampule by nebulization every 4 hours as needed for shortness of breath / dyspnea or wheezing And every 4 hours PRN       Lidocaine (LIDOCARE) 4 % Patch Place 1 patch onto the skin daily To desired area (shoulder or hip). On for 12hrs, off for 12hrs 30 patch 11     lisinopril (PRINIVIL/ZESTRIL) 20 MG tablet Take 1 tablet (20 mg) by mouth daily 30 tablet 11     Menthol, Topical Analgesic, (BIOFREEZE) 4 % GEL Externally apply topically 4 times daily as needed To left shoulder and right hip       NIFEdipine ER (ADALAT CC) 60 MG 24 hr tablet Take 60 mg by mouth daily        nystatin (MYCOSTATIN) 516375 UNIT/GM external powder Apply topically 3 times daily 1 Bottle 97     omeprazole (PRILOSEC) 40 MG DR capsule TAKE 1 CAPSULE BY MOUTH ONCE DAILY 28 capsule PRN     ondansetron (ZOFRAN) 4 MG tablet Take 4 mg by mouth every 6 hours as needed for nausea       oxyCODONE (OXYCONTIN) 10 MG 12 hr tablet TAKE ONE  TABLET BY MOUTH EVERY 12 HOURS - DO NOT CRUSH 28 tablet 0     predniSONE (DELTASONE) 5 MG tablet TAKE 1 TABLET BY MOUTH ONCE DAILY 31 tablet PRN     tiZANidine (ZANAFLEX) 2 MG tablet Take 2 mg by mouth every 8 hours as needed        VITAMIN D3 25 MCG (1000 UT) tablet TAKE 1 TABLET BY MOUTH ONCE DAILY (Patient taking differently: Take 25 mcg by mouth daily ) 30 tablet 97     warfarin ANTICOAGULANT (COUMADIN) 1 MG tablet Take 2 tabs (2 mg) every MWF, and take 3 tabs (3 mg) all other days of the week or as directed by the Anticoagulation Clinic 90 tablet 1      Medication Changes/Rationale:     Added Culturelle x 14 days due to abx use, h/o c.diff.   Controlled medications sent with patient:   Medication: dilaudid , # remainined tabs given to patient at the time of discharge to take home  Medication: oxycontin , # remaning at TCU tabs given to patient at the time of discharge to take home     ROS: 4 point ROS including Respiratory, CV, GI and , other than that noted in the HPI,  is negative    Physical Exam: Vitals: BP (!) 154/81   Pulse 90   Temp 98.1  F (36.7  C)   Resp 25   Wt 79.6 kg (175 lb 6.4 oz)   SpO2 93%   BMI 31.07 kg/m   BMI= Body mass index is 31.07 kg/m .  Limited Visit exam done for COVID-19 precautions  GENERAL APPEARANCE:  Alert, in no distress, oriented, cooperative  RESP:  no respiratory distress, on RA, no cough noted  CV:  FALLON for edema as farrow wraps in place  M/S:   primarily WC bound,no gross joint deformities  SKIN:  Inspection of skin and subcutaneous tissue baseline, exam limited as patient fully clothed with farrow wraps in place  NEURO:   speech clear, no facial droop  PSYCH:  oriented X 3, normal insight, judgement and memory, affect and mood normal     SNF labs: Recent labs in [facility] reviewed by me today.  and   Most Recent 3 CBC's:  Recent Labs   Lab Test 06/11/20  0720 06/03/20  0422 06/01/20  2346   WBC 7.9 6.2 7.8   HGB 10.1* 9.8* 12.3   MCV 92 93 93    183 236     Most  Recent 3 BMP's:  Recent Labs   Lab Test 06/11/20  0720 06/03/20  0422 06/01/20  2346    144 144   POTASSIUM 4.1 4.1 4.2   CHLORIDE 109 110* 110*   CO2 30 30 27   BUN 28 27 32*   CR 1.13* 1.11* 1.47*   ANIONGAP 5 4 7   BLAIR 9.5 8.8 9.8   GLC 95 102* 114*     Most Recent 3 INR's:  Recent Labs   Lab Test 06/17/20 06/12/20 06/08/20   INR 2.3* 2.4* 2.3*       DISCHARGE PLAN:    Follow up labs:  INR per [facility]    Medical Follow Up:      Follow up with primary care provider in 2 weeks  Follow-up dentist as scheduled    Current [place] [facility] scheduled appointments:       Discharge Services: Home Care:  Occupational Therapy, Physical Therapy, Home Health Aide and From:  [facility]    Discharge Instructions Verbalized to Patient at Discharge:     Weigh yourself daily in the morning and keep a record. Call your primary clinic: a) if you are more short of breath, or b) if your weight changes more than 3 pounds in one day or more than 5 pounds in one week.     DO NOT DRIVE while taking narcotic pain medications.     24-hour supervision is recommended for safety.     TOTAL DISCHARGE TIME:   Greater than 30 minutes  Electronically signed by:  MARY Gómez CNP     Video-Visit Details  Type of service:  Video Visit  Video End Time (time video stopped): 1334, Telephone care conference from 1400- 1429  Distant Location (provider location):  [facility]     Home care Face to Face documentation done in [facility] attached to Home care orders for [facility].

## 2020-06-17 NOTE — PROGRESS NOTES
ANTICOAGULATION MANAGEMENT     Patient Name:  Jazmin Clifton  Date:  2020    ASSESSMENT /SUBJECTIVE:    Today's INR result of 2.3 is therapeutic. Goal INR of 2.0-3.0      Warfarin dose taken: Warfarin taken as previously instructed    Diet: No new diet changes affecting INR    Medication changes/ interactions: Just completed Keflex for Cellulitis    Previous INR: Therapeutic     S/S of bleeding or thromboembolism: No    New injury or illness: No    Upcoming surgery, procedure or cardioversion: No  Additional findings: Maybe discharging from TCU on     PLAN:    Spoke with Nurse Carmen regarding INR result and instructed:     Warfarin Dosing Instructions: Continue your current warfarin dose 2 mg MWF, 3 mg all other days    Instructed patient to follow up no later than: 5 days  Orders given to  Homecare nurse/facility to recheck    Education provided: Monitoring for bleeding signs and symptoms and Monitoring for clotting signs and symptoms      Nurse Carmen verbalizes understanding and agrees to warfarin dosing plan.    Instructed to call the Anticoagulation Clinic for any changes, questions or concerns. (#[phone])        OBJECTIVE:  INR   Date Value Ref Range Status   2020 2.3 (A) 0.90 - 1.10 Final         No question data found.  Anticoagulation Summary  As of 2020    INR goal:   2.0-3.0   TTR:   77.2 % (2.7 mo)   INR used for dosin.3 (2020)   Warfarin maintenance plan:   2 mg (1 mg x 2) every Mon, Wed, Fri; 3 mg (1 mg x 3) all other days   Full warfarin instructions:   2 mg every Mon, Wed, Fri; 3 mg all other days   Weekly warfarin total:   18 mg   Plan last modified:   Michelle Alvarez RN (3/31/2020)   Next INR check:   2020   Priority:   High   Target end date:   3/11/2021    Indications    Paroxysmal atrial fibrillation (H) [I48.0]  Atrial fibrillation  unspecified type (H) [I48.91]  History of DVT (deep vein thrombosis) [Z86.718]  CVA (cerebral vascular accident) (H)  (Resolved) [I63.9]             Anticoagulation Episode Summary     INR check location:       Preferred lab:       Send INR reminders to:   GUSTAVO FARAH    Comments:   History of CVA 2017 s/p endarterectomy in 2017 Previous clipping of aneurysm. [facility]. Lives at [facility]. Facility lab days are MON/THURS. Fax orders to [phone] Phone: [phone]      Anticoagulation Care Providers     Provider Role Specialty Phone number    Junie Estrella APRN CNP Responsible Nurse Practitioner - Gerontology [phone]

## 2020-06-18 NOTE — PROGRESS NOTES
[facility] Care Coordination Contact  CC attended care conference for [facility]   Present at care conference member, this care coordinator, adult son (Chi), NH  (Savi), NH Therapy (Amy) and Junie NP with [facility]; Reena ELKINS director of AL  .  PT Report:  Amy has seen member during her prior stays a the TCU.  PT stated that member is doing well this time and is supportive of member returning to AL but stated that member needs help with every transfer.  She feel this would provider the safety plan for member.  Member has DME that is appropriate at this time but adjustments may be made to toilet safety frame and staff will provide this.    Nursing Report: SEVERO Crump at the AL was present.  During discussion CC asked her to comment on member ability to call for staff to help with every transfer.  RN stated this is fine and encouraged her to call for help with this.    Dietician Report: Discussion was had about member needing to go to dentist and concerns about member choking on meat.  Member stated she did not like mechanical soft diet but understood this was the best until she could see the dentist.    Dental appointment made for June 27th with transportation made with [facility] [email or web address] transport.    Social Work Report: She stated that member is scheduled to return to the AL on Tuesday the 23rd.  Home call will be ordered.  Member had concerns about her bed rails being loose so SW and RN from AL will go over to members room and see what needs to be done.    CC asked that if they needed anything ordered to reach out to CC.        CC Report:  Member has not bee at the TCU for greater then 30 day so EW will remain open.   CC will review with AL staff the CP at the AL to see about increasing members tolieting assistance and transfer assistance.  Rewiew will be done to see if CM has changed and if not then RS tool will be updated as appropriate.   CC will f/u as needed.     Mirian Weldon MA  [facility] Care Coordinator   [phone] - work cell phone   [phone] - work fax

## 2020-06-18 NOTE — LETTER
"    6/18/2020        RE: Jazmin Clifton  [address]  [facility] [address]        [facility] DISCHARGE SUMMARY  Jazmin Clifton is being evaluated via a billable video visit due to the restrictions of the Covid-19 pandemic.   The patient has been notified of following:  \"This video visit will be conducted via a call between you and a [facility] provider. We have found that certain health care needs can be provided without the need for an in-person physical exam.  This service lets us provide the care you need with a video conversation. If during the course of the call the provider feels a video visit is not appropriate, you will not be charged for this service.\"   The provider has received verbal consent for a Video Visit from the patient or family/first contact? Yes  Patient or /facility staff would like the video invitation sent by: N/A   Video Start Time: 1324    PATIENT'S NAME: Jazmin Clifton  YOB: 1932  MEDICAL RECORD NUMBER:  [id number]  Place of Location at the time of visit: [facility]  PRIMARY CARE PROVIDER AND CLINIC RESPONSIBLE AFTER TRANSFER:   MARY Gómez CNP, [address] [place] [id number] ;  [facility] Provider   Transferring providers: MARY Gómez CNP, Dr. Gigi MD  Recent Hospitalization/ED:  [facility] stay 6/1/20 to 6/5/20.  Date of SNF Admission: June / 05 / 2020  Date of SNF (anticipated) Discharge: June / 23 / 2020  Discharged to: previous assisted living  Cognitive Scores: BIMS: 12/15  Physical Function: Wheelchair dependent and Pivot transfers  DME: Wheelchair  CODE STATUS/ADVANCE DIRECTIVES DISCUSSION:  DNR / DNI   ALLERGIES: Accupril; Ace inhibitors; Augmentin; Blood-group specific substance; Levofloxacin; Morphine; Nitrofurantoin; Norvasc [amlodipine besylate]; and Quinapril    DISCHARGE DIAGNOSIS/NURSING FACILITY COURSE:   From MD TCU admission note: Jazmin Clifton is a 87 year " old  (12/30/1932) female who [facility] for an initial visit. Medical history is notable for CAD, HTN, COPD, atrial fibrillation, CVA, lumbar stenosis s/p L1-L2 bilateral laminectomy and medial facetectomy. She was hospitalized at [facility] from 6/1/20 to 6/5/20 where she presented after a fall in her AL apartment. She lost balance after going to the bathroom. Imaging negative for fractures. She was hypoxic to 87% in the ER. CT chest with some ground glass opacities with recommended repeat imaging in 3 months. Hypoxia felt secondary to hypoventilation from narcotics. COVID19 PCR negative x2 (6/2 and 6/4). She was admitted to this facility for medical management and rehab.     Recurrent falls  Likely secondary to limited mobility and overall decline. Had care conference today with PT, SW, CC, Jazmin, and son. Discussed falls risks and concerns of patient returning to AL. She may benefit from LTC placement, but she is not currently interested in this. Discussed that she remains a high falls risk, and that falls could result in catastrophic injury or death. Has been recommended that she has assistance for all transfers, she was hesitant, but agreeable to have staff assist with transfers and toileting. She will have home PT, OT, and HHA as well as increased safety checks upon discharge back to [facility].  - HOME CARE NURSING REFERRAL    Paroxysmal atrial fibrillation (H)  History of DVT (deep vein thrombosis)  HR controlled 70's-90's. Anticoagulated on coumadin.  Discussed bleeding risk with patient given falls risk and coumadin use. Discussed that fall could result in traumatic bleeding including  head bleed that could be fatal. Also discussed stroke risk with stopping coumadin use. CHADs-vasc score currently 8, indicating a 7.2-12.2% risk of CVA in the next year. Jazmin stated that she would like to continue coumadin use despite bleeding risk. Coumadin managed by [facility].      DDD (degenerative disc disease), lumbar  Pain syndrome, chronic  Has been previously seen by pain clinic. States only minimal pain today, only minimal use of PRN pain medications in TCU. Continues with APAP 1000 mg TID, Biofreeze QID PRN, duloxetine 60 mg qam and 30 mg at bedtime, gabapentin 100 mg daily, hydromorphone 2 mg BID PRN, lidoderm patch, OxyContin 10 mg q12 and tizanidine 2 mg q8h PRN  - HOME CARE NURSING REFERRAL    Cellulitis of left lower extremity  Skin tear of left lower leg without complication, subsequent encounter  Developed increased, swelling, pain, and erythema near skin tear on left shin. She was treated with 7 day course of keflex, symptoms improved, abx completed.     Coronary artery disease involving native coronary artery of native heart without angina pectoris  Essential hypertension  Hyperlipidemia, unspecified hyperlipidemia type  Congestive heart failure, unspecified HF chronicity, unspecified heart failure type (H)  's-150's recently in TCU. Weight stable ~175, though is down about 10lb in the past 6 months - ? If water weight. No concerns for exacerbation. Continues on atorvastatin 40 mg qhs, furosemide 20 mg daily, lisinopril 20 mg daily and nifedipine 60 mg daily     Chronic obstructive pulmonary disease, unspecified COPD type (H)  No concerns for exacerbation in TCU. Continues on fluticasone-vilanterol 100-25 mcg daily, prednisone 5 mg daily and levalbuterol nebs PRN. CT chest w/ ground glass opacities with recommendation for follow up imaging in 3 months  - HOME CARE NURSING REFERRAL    Depression with anxiety  Initially depressed as was unhappy to have fallen and be back in TCU, but mood has improved significantly during TCU stay. Continues on  duloxetine 60 mg qam and 30 mg at bedtime     Iron deficiency anemia, unspecified iron deficiency anemia type  Hgb stable, continues on iron supplemt    Slow transit constipation  Had constipation last week, now having normal  BM's. Continues on senna-s.     Gastroesophageal reflux disease without esophagitis  Continue on omeprazole    CKD (chronic kidney disease) stage 3, GFR 30-59 ml/min (H)  Recent Baseline 1.1-1.3, stable, see labs,.     Oral phase dysphagia  Likely due to ill fitting dentures, She was seen by SLP - recommended she continue on mechanical soft diet. She has follow-up with dentist on 6/25.       Past Medical History:  has a past medical history of ABDOMINAL PAIN GENERALIZED (3/15/2006), Abdominal pain, generalized (3/15/2006), Atherosclerosis of renal artery (H), BENIGN HYPERTENSION (5/1/2006), Benign neoplasm of scalp and skin of neck, Cerebral aneurysm, nonruptured, Congestive heart failure (H), Depressive disorder, not elsewhere classified, Esophageal reflux, Female stress incontinence, Gastrointestinal malfunction arising from mental factors, Generalized osteoarthrosis, unspecified site, Herpes zoster dermatitis of eyelid, Insomnia, unspecified, Lumbago, Other chest pain, Other specified cardiac dysrhythmias(427.89), Rectocele, Unspecified disorder of kidney and ureter, and Unspecified essential hypertension.  Discharge Medications:    Current Outpatient Medications   Medication Sig Dispense Refill     acetaminophen (TYLENOL) 500 MG tablet Take 1,000 mg by mouth 3 times daily       atorvastatin (LIPITOR) 40 MG tablet TAKE 1 TABLET BY MOUTH ONCE DAILY 28 tablet PRN     benzocaine (ORAJEL/ANBESOL) 10 % gel Take by mouth 4 times daily as needed for moderate pain       bisacodyl (DULCOLAX) 10 MG suppository Place 10 mg rectally daily as needed for constipation       Blood Glucose Monitoring Suppl CORY 2 times daily Call nurse for further directions if blood glucose is less than 80 or greater than 250       BREO ELLIPTA 100-25 MCG/INH inhaler INHALE 1 PUFF BY MOUTH ONCE DAILY (Patient taking differently: Inhale 1 puff into the lungs daily ) 1 Inhaler 97     calcium carbonate (TUMS) 500 MG chewable tablet Take 1 chew tab  by mouth 3 times daily as needed       DOK PLUS 50-8.6 MG tablet TAKE 1 TABLET BY MOUTH TWICE DAILY (Patient taking differently: Take 1 tablet by mouth 2 times daily ) 60 tablet 98     DULoxetine (CYMBALTA) 30 MG capsule Take 30 mg by mouth At Bedtime       DULoxetine (CYMBALTA) 60 MG capsule Take 1 capsule (60 mg) by mouth daily       Emollient (MOISTURIZING LOTION EX) Externally apply topically 2 times daily Bilateral lower extremeties       ferrous sulfate (FEROSUL) 325 (65 Fe) MG tablet Take 1 tablet (325 mg) by mouth daily 30 tablet 1     furosemide (LASIX) 20 MG tablet TAKE 1 TABLET BY MOUTH ONCE DAILY 30 tablet 98     gabapentin (NEURONTIN) 100 MG capsule Take 100 mg by mouth daily       HYDROmorphone (DILAUDID) 2 MG tablet TAKE 1 TABLET BY MOUTH TWICE DAILY AS NEEDED FOR SEVERE PAIN 12 tablet 0     hypromellose-dextran (GENTEAL TEARS) 0.1-0.3 % ophthalmic solution Place 1 drop into both eyes every 6 hours as needed for dry eyes       lactobacillus rhamnosus, GG, (CULTURELL) capsule Take 1 capsule by mouth 2 times daily for 14 days 28 capsule 0     levalbuterol (XOPENEX) 1.25 MG/3ML neb solution Take 1 ampule by nebulization every 4 hours as needed for shortness of breath / dyspnea or wheezing And every 4 hours PRN       Lidocaine (LIDOCARE) 4 % Patch Place 1 patch onto the skin daily To desired area (shoulder or hip). On for 12hrs, off for 12hrs 30 patch 11     lisinopril (PRINIVIL/ZESTRIL) 20 MG tablet Take 1 tablet (20 mg) by mouth daily 30 tablet 11     Menthol, Topical Analgesic, (BIOFREEZE) 4 % GEL Externally apply topically 4 times daily as needed To left shoulder and right hip       NIFEdipine ER (ADALAT CC) 60 MG 24 hr tablet Take 60 mg by mouth daily        nystatin (MYCOSTATIN) 333576 UNIT/GM external powder Apply topically 3 times daily 1 Bottle 97     omeprazole (PRILOSEC) 40 MG DR capsule TAKE 1 CAPSULE BY MOUTH ONCE DAILY 28 capsule PRN     ondansetron (ZOFRAN) 4 MG tablet Take 4 mg by mouth  every 6 hours as needed for nausea       oxyCODONE (OXYCONTIN) 10 MG 12 hr tablet TAKE ONE TABLET BY MOUTH EVERY 12 HOURS - DO NOT CRUSH 28 tablet 0     predniSONE (DELTASONE) 5 MG tablet TAKE 1 TABLET BY MOUTH ONCE DAILY 31 tablet PRN     tiZANidine (ZANAFLEX) 2 MG tablet Take 2 mg by mouth every 8 hours as needed        VITAMIN D3 25 MCG (1000 UT) tablet TAKE 1 TABLET BY MOUTH ONCE DAILY (Patient taking differently: Take 25 mcg by mouth daily ) 30 tablet 97     warfarin ANTICOAGULANT (COUMADIN) 1 MG tablet Take 2 tabs (2 mg) every MWF, and take 3 tabs (3 mg) all other days of the week or as directed by the Anticoagulation Clinic 90 tablet 1      Medication Changes/Rationale:     Added Culturelle x 14 days due to abx use, h/o c.diff.   Controlled medications sent with patient:   Medication: dilaudid , # remainined tabs given to patient at the time of discharge to take home  Medication: oxycontin , # remaning at TCU tabs given to patient at the time of discharge to take home     ROS: 4 point ROS including Respiratory, CV, GI and , other than that noted in the HPI,  is negative    Physical Exam: Vitals: BP (!) 154/81   Pulse 90   Temp 98.1  F (36.7  C)   Resp 25   Wt 79.6 kg (175 lb 6.4 oz)   SpO2 93%   BMI 31.07 kg/m   BMI= Body mass index is 31.07 kg/m .  Limited Visit exam done for COVID-19 precautions  GENERAL APPEARANCE:  Alert, in no distress, oriented, cooperative  RESP:  no respiratory distress, on RA, no cough noted  CV:  FALLON for edema as farrow wraps in place  M/S:   primarily WC bound,no gross joint deformities  SKIN:  Inspection of skin and subcutaneous tissue baseline, exam limited as patient fully clothed with farrow wraps in place  NEURO:   speech clear, no facial droop  PSYCH:  oriented X 3, normal insight, judgement and memory, affect and mood normal     SNF labs: Recent labs in [facility] reviewed by me today.  and   Most Recent 3 CBC's:  Recent Labs   Lab Test 06/11/20  0720 06/03/20  3216  06/01/20  2346   WBC 7.9 6.2 7.8   HGB 10.1* 9.8* 12.3   MCV 92 93 93    183 236     Most Recent 3 BMP's:  Recent Labs   Lab Test 06/11/20  0720 06/03/20  0422 06/01/20  2346    144 144   POTASSIUM 4.1 4.1 4.2   CHLORIDE 109 110* 110*   CO2 30 30 27   BUN 28 27 32*   CR 1.13* 1.11* 1.47*   ANIONGAP 5 4 7   BLAIR 9.5 8.8 9.8   GLC 95 102* 114*     Most Recent 3 INR's:  Recent Labs   Lab Test 06/17/20 06/12/20 06/08/20   INR 2.3* 2.4* 2.3*       DISCHARGE PLAN:    Follow up labs:  INR per [facility]    Medical Follow Up:      Follow up with primary care provider in 2 weeks  Follow-up dentist as scheduled    Current [facility] scheduled appointments:       Discharge Services: Home Care:  Occupational Therapy, Physical Therapy, Home Health Aide and From:  [facility]    Discharge Instructions Verbalized to Patient at Discharge:     Weigh yourself daily in the morning and keep a record. Call your primary clinic: a) if you are more short of breath, or b) if your weight changes more than 3 pounds in one day or more than 5 pounds in one week.     DO NOT DRIVE while taking narcotic pain medications.     24-hour supervision is recommended for safety.     TOTAL DISCHARGE TIME:   Greater than 30 minutes  Electronically signed by:  MARY Gómez CNP     Video-Visit Details  Type of service:  Video Visit  Video End Time (time video stopped): 1334, Telephone care conference from 1400- 1429  Distant Location (provider location):  [facility]     Home care Face to Face documentation done in [facility] attached to Home care orders for [facility].                     Sincerely,        MARY Gómez CNP

## 2020-06-19 NOTE — PROGRESS NOTES
"ANTICOAGULATION MANAGEMENT     Patient Name:  Jazmin Clifton  Date:  6/19/2020    ASSESSMENT /SUBJECTIVE:    Today's INR result of 3.8 is supratherapeutic. Goal INR of 2.0-3.0      Warfarin dose taken: Warfarin taken as previously instructed    Diet: No new diet changes affecting INR    Medication changes/ interactions: No new medications/supplements affecting INR    Previous INR: Therapeutic     S/S of bleeding or thromboembolism: No    New injury or illness: No    Upcoming surgery, procedure or cardioversion: No    Additional findings: discharging home to assisted living on Tuesday 6/22, RN accidentally took INR today and reported high result - can not think of any health changes and states patient reports \"feeling better then ever\".       PLAN:    Spoke with Carmen ELKINS [place] regarding INR result and instructed:     Warfarin Dosing Instructions: Hold todays dose then continue your current warfarin dose of 2mg MWF & 3mg AOD    Instructed patient to follow up no later than: 3 days prior to discharge   Orders given to  Homecare nurse/facility to recheck    Education provided: Importance of consistent vitamin K intake, Impact of vitamin K foods on INR, Vitamin K content of foods, Importance of notifying clinic for changes in medications, Monitoring for bleeding signs and symptoms, Monitoring for clotting signs and symptoms and When to seek medical attention/emergency care      Carmen verbalizes understanding and agrees to warfarin dosing plan.    Instructed to call the Anticoagulation Clinic for any changes, questions or concerns. (#[phone])        OBJECTIVE:  INR   Date Value Ref Range Status   06/19/2020 3.8 (A) 0.90 - 1.10 Final         No question data found.  Anticoagulation Summary  As of 6/19/2020    INR goal:   2.0-3.0   TTR:   76.5 % (2.8 mo)   INR used for dosing:   3.8! (6/19/2020)   Warfarin maintenance plan:   2 mg (1 mg x 2) every Mon, Wed, Fri; 3 mg (1 mg x 3) all other days   Full warfarin " instructions:   2 mg every Mon, Wed, Fri; 3 mg all other days   Weekly warfarin total:   18 mg   Plan last modified:   Michelle Alvarez RN (3/31/2020)   Next INR check:      Priority:   High   Target end date:   3/11/2021    Indications    Paroxysmal atrial fibrillation (H) [I48.0]  Atrial fibrillation  unspecified type (H) [I48.91]  History of DVT (deep vein thrombosis) [Z86.718]  CVA (cerebral vascular accident) (H) (Resolved) [I63.9]             Anticoagulation Episode Summary     INR check location:       Preferred lab:       Send INR reminders to:   GUSTAVO FARAH    Comments:   History of CVA 2017 s/p endarterectomy in 2017 Previous clipping of aneurysm. [facility]. Lives at [facility]. Facility lab days are MON/THURS. Fax orders to [phone] Phone: [phone]      Anticoagulation Care Providers     Provider Role Specialty Phone number    Junie Estrella APRN CNP Responsible Nurse Practitioner - Gerontology [phone]         Felicita Mendez, RN, BSN, PHN

## 2020-06-22 NOTE — PROGRESS NOTES
ANTICOAGULATION MANAGEMENT     Patient Name:  Jazmin Clifton  Date:  2020    ASSESSMENT /SUBJECTIVE:    Today's INR result of 1.8 is subtherapeutic. Goal INR of 2.0-3.0      Warfarin dose taken: Warfarin taken as previously instructed    Diet: No new diet changes affecting INR    Medication changes/ interactions: No new medications/supplements affecting INR    Previous INR: Supratherapeutic     S/S of bleeding or thromboembolism: No    New injury or illness: No    Upcoming surgery, procedure or cardioversion: No    Additional findings: Discharging from TCU Tomorrow back to [facility]      PLAN:    Spoke with Nurse Sasha regarding INR result and instructed:     Warfarin Dosing Instructions: Continue your current warfarin dose 2 mg MWF, 3 mg all other days    Instructed patient to follow up no later than: 1 week  Orders given to  Homecare nurse/facility to recheck    Education provided: Monitoring for bleeding signs and symptoms and Monitoring for clotting signs and symptoms      Nurse Sasha verbalizes understanding and agrees to warfarin dosing plan.    Instructed to call the Anticoagulation Clinic for any changes, questions or concerns. (#[phone])        OBJECTIVE:  INR   Date Value Ref Range Status   2020 1.8 (A) 0.90 - 1.10 Final         No question data found.  Anticoagulation Summary  As of 2020    INR goal:   2.0-3.0   TTR:   75.6 % (2.9 mo)   INR used for dosin.8! (2020)   Warfarin maintenance plan:   2 mg (1 mg x 2) every Mon, Wed, Fri; 3 mg (1 mg x 3) all other days   Full warfarin instructions:   2 mg every Mon, Wed, Fri; 3 mg all other days   Weekly warfarin total:   18 mg   No change documented:   Arely Marino RN   Plan last modified:   Michelle Alvarez RN (3/31/2020)   Next INR check:   2020   Priority:   High   Target end date:   3/11/2021    Indications    Paroxysmal atrial fibrillation (H) [I48.0]  Atrial fibrillation  unspecified type (H) [I48.91]  History of DVT  (deep vein thrombosis) [Z86.718]  CVA (cerebral vascular accident) (H) (Resolved) [I63.9]             Anticoagulation Episode Summary     INR check location:       Preferred lab:       Send INR reminders to:   GUSTAVO FARAH    Comments:   History of CVA 2017 s/p endarterectomy in 2017 Previous clipping of aneurysm. [facility]. Lives at [facility]. Facility lab days are MON/THURS. Fax orders to [phone] Phone: [phone]      Anticoagulation Care Providers     Provider Role Specialty Phone number    Junie Estrella APRN CNP Responsible Nurse Practitioner - Gerontology [phone]

## 2020-06-26 NOTE — PROGRESS NOTES
6-26-20   CC was asked to order bed rail and install this per request of PT at [facility].    CC did ask RN Supervisor Emy DUNCAN at AL if this was an approved of rail.  See her responds below. CC also clarfied if [employer] company would be able to come in to the AL with Covid restrictions and she said they would be allowed.        -----Original Message-----  From: Emy Castro <[email or web address]>   Sent: Monday, June 22, 2020 5:36 PM  To: Savi Adams <[email or web address]>  Cc: Mirian Weldon <[email or web address]>  Subject: RE:     I double checked with my regional and he said it would need to have a strap that attaches it to the mattress which it looks like it does.    Emy Castro, BSN, RN, PHN, DNS-CT  Director of Nursing/Director of Health Services [facility] [place]    [address] [place] [address]  Direct: [phone]    Fax: [phone] [email or web address]   [email or web address]        CC will ask CMS to authorize this for member.     Mirian Weldon MA [facility] Care Coordinator   [phone] - work cell phone   [phone] - work fax

## 2020-06-26 NOTE — PROGRESS NOTES
6-26-20  CC received update from RN at AL that member is doing well but still struggling with calling for help when transferring.  CC did ask for update CP to reflect increased support so RS tool could be updated.   CC also ordered bed rail and installation and will f/u with AL staff as needed about this.     CC will then continue to f/u as needed.   Mirian Weldon MA [facility] Coordinator   [phone] - ijbm cell phone   [phone] - mdon fax

## 2020-06-29 NOTE — LETTER
6/29/2020        RE: Tamra Clifton  [address]  [facility] [id number]        [facility]  [facility] Record Number:  [id number]  Place of Service where encounter took place:  [facility] [[id number]]  Chief Complaint   Patient presents with     RECHECK       HPI:    Tamra Clifton  is a 87 year old (12/30/1932), who is being seen today for an episodic care visit.  HPI information obtained from: facility chart records, facility staff, patient report and [facility] chart review. Today's concern is:     Diarrhea, unspecified type  Nausea  Gastroesophageal reflux disease without esophagitis  Oral phase dysphagia  Weight loss  Essential hypertension  Congestive heart failure, unspecified HF chronicity, unspecified heart failure type (H)  Iron deficiency anemia, unspecified iron deficiency anemia type  CKD (chronic kidney disease) stage 3, GFR 30-59 ml/min (H)  Recurrent falls  Atrial fibrillation, unspecified type (H)  Long term current use of anticoagulant therapy  Intermittent confusion  Closed fracture of ramus of left pubis, initial encounter (H)   Patient seen today with TERRA nurse to follow-up on discharge from TCU last week. She had recently been hospitalized s/p fall, hypoxia with possible COPD exacerbation. Staff reports she was having abdominal discomfort last week and diarrhea. C.diff test ordered, but symptoms resolved and stool became formed prior to collection. Tamra states she is not having any belly discomfort today, but continues to have intermittent belly pain and nausea. She had not noticed any pattern to this. She saw the dentist last week and ehr dentures were adjusted. She states she was not happy with her dentist visit because it was uncomfortable and she had a hard time getting to the dentist chair. She does not want to go back. Her gum pain has been getting better and she does think her dentures are fitting better. She  has been on a mechanical soft diet, and is hoping that therapy will advance it. She does feel she has had and choking or issues with her swallowing. Discussed that  Staff have noted coughing episodes while eating and that is concerning for aspiration. She does admit to these coughing episodes, but does not think it is due to swallowing issues.     Discussed again that she needs to call for help with all transfers due to her falls risk. She states she tries to remember, but sometime she forgets. She has been working with home care. They report she seemed more confused and weak last Friday, but seem more of herself today.     Past Medical and Surgical History reviewed in Epic today.    MEDICATIONS:    Current Outpatient Medications   Medication Sig Dispense Refill     acetaminophen (TYLENOL) 500 MG tablet Take 1,000 mg by mouth 3 times daily       atorvastatin (LIPITOR) 40 MG tablet TAKE 1 TABLET BY MOUTH ONCE DAILY 28 tablet PRN     benzocaine (ORAJEL/ANBESOL) 10 % gel Take by mouth 4 times daily as needed for moderate pain       bisacodyl (DULCOLAX) 10 MG suppository Place 10 mg rectally daily as needed for constipation       Blood Glucose Monitoring Suppl CORY 2 times daily Call nurse for further directions if blood glucose is less than 80 or greater than 250       BREO ELLIPTA 100-25 MCG/INH inhaler INHALE 1 PUFF BY MOUTH ONCE DAILY (Patient taking differently: Inhale 1 puff into the lungs daily ) 1 Inhaler 97     calcium carbonate (TUMS) 500 MG chewable tablet Take 1 chew tab by mouth 3 times daily as needed       DOK PLUS 50-8.6 MG tablet TAKE 1 TABLET BY MOUTH TWICE DAILY (Patient taking differently: Take 1 tablet by mouth 2 times daily ) 60 tablet 98     DULoxetine (CYMBALTA) 30 MG capsule Take 30 mg by mouth At Bedtime       DULoxetine (CYMBALTA) 60 MG capsule Take 1 capsule (60 mg) by mouth daily       Emollient (MOISTURIZING LOTION EX) Externally apply topically 2 times daily Bilateral lower extremeties        ferrous sulfate (FEROSUL) 325 (65 Fe) MG tablet Take 1 tablet (325 mg) by mouth daily 30 tablet 1     furosemide (LASIX) 20 MG tablet TAKE 1 TABLET BY MOUTH ONCE DAILY 30 tablet 98     gabapentin (NEURONTIN) 100 MG capsule Take 100 mg by mouth daily       HYDROmorphone (DILAUDID) 2 MG tablet TAKE 1 TABLET BY MOUTH TWICE DAILY AS NEEDED FOR SEVERE PAIN 12 tablet 0     hypromellose-dextran (GENTEAL TEARS) 0.1-0.3 % ophthalmic solution Place 1 drop into both eyes every 6 hours as needed for dry eyes       levalbuterol (XOPENEX) 1.25 MG/3ML neb solution Take 1 ampule by nebulization every 4 hours as needed for shortness of breath / dyspnea or wheezing And every 4 hours PRN       Lidocaine (LIDOCARE) 4 % Patch Place 1 patch onto the skin daily To desired area (shoulder or hip). On for 12hrs, off for 12hrs 30 patch 11     lisinopril (PRINIVIL/ZESTRIL) 20 MG tablet Take 1 tablet (20 mg) by mouth daily 30 tablet 11     Menthol, Topical Analgesic, (BIOFREEZE) 4 % GEL Externally apply topically 4 times daily as needed To left shoulder and right hip       NIFEdipine ER (ADALAT CC) 60 MG 24 hr tablet Take 60 mg by mouth daily        nystatin (MYCOSTATIN) 225202 UNIT/GM external powder Apply topically 3 times daily 1 Bottle 97     omeprazole (PRILOSEC) 40 MG DR capsule TAKE 1 CAPSULE BY MOUTH ONCE DAILY 28 capsule PRN     ondansetron (ZOFRAN) 4 MG tablet Take 4 mg by mouth every 6 hours as needed for nausea       oxyCODONE (OXYCONTIN) 10 MG 12 hr tablet TAKE ONE TABLET BY MOUTH EVERY 12 HOURS - DO NOT CRUSH 28 tablet 0     predniSONE (DELTASONE) 5 MG tablet TAKE 1 TABLET BY MOUTH ONCE DAILY 31 tablet PRN     tiZANidine (ZANAFLEX) 2 MG tablet Take 2 mg by mouth every 8 hours as needed        VITAMIN D3 25 MCG (1000 UT) tablet TAKE 1 TABLET BY MOUTH ONCE DAILY (Patient taking differently: Take 25 mcg by mouth daily ) 30 tablet 97     warfarin ANTICOAGULANT (COUMADIN) 1 MG tablet Take 2 tabs (2 mg) every MWF, and take 3 tabs (3  mg) all other days of the week or as directed by the Anticoagulation Clinic 90 tablet 1         REVIEW OF SYSTEMS:  4 point ROS including Respiratory, CV, GI and , other than that noted in the HPI,  is negative    Objective:  /69   Pulse 73   Temp 97.3  F (36.3  C)   Resp 18   Wt 78.5 kg (173 lb)   SpO2 94%   BMI 30.65 kg/m    Exam:  GENERAL APPEARANCE:  Alert, in no distress, cooperative  RESP:  respiratory effort and palpation of chest normal, lungs clear to auscultation , no respiratory distress  CV:  Palpation and auscultation of heart done , regular rate and rhythm, no murmur, rub, or gallop, peripheral edema 2-3+ in bilat feet adn ankles  ABDOMEN:  no guarding or rebound, bowel sounds normal, no distension, no masses  M/S:   non-ambulatory, decrease ROM to left shoulder  SKIN:  Inspection of skin and subcutaneous tissue baseline, exam limited as patient is fully clothed  NEURO:   speech clear, PETERSEN  PSYCH:  oriented X 3, affect and mood normal, forgetful    Labs:   Recent labs in [facility] reviewed by me today.  and   Most Recent 3 CBC's:  Recent Labs   Lab Test 06/29/20  1115 06/11/20  0720 06/03/20  0422   WBC 8.0 7.9 6.2   HGB 11.8 10.1* 9.8*   MCV 94 92 93    217 183     Most Recent 3 BMP's:  Recent Labs   Lab Test 06/29/20  1115 06/11/20  0720 06/03/20  0422    144 144   POTASSIUM 3.8 4.1 4.1   CHLORIDE 106 109 110*   CO2 30 30 30   BUN 39* 28 27   CR 1.22* 1.13* 1.11*   ANIONGAP 7 5 4   BLAIR 9.8 9.5 8.8   * 95 102*     Most Recent 3 INR's:  Recent Labs   Lab Test 06/29/20 06/22/20 06/19/20   INR 2.9* 1.8* 3.8*       ASSESSMENT/PLAN:  (R19.7) Diarrhea, unspecified type  (primary encounter diagnosis)  (R11.0) Nausea  (K21.9) Gastroesophageal reflux disease without esophagitis  Comment: Symptoms not present currently, suspect polypharmacy contributing, have reach out to pharmacy for medication review.   Plan: Continue omeprazole, continue PRN zofran, continue to monitor.      Intermittent confusion  Comment: Staff have reported increased forgetfulness and more confusion conversation since return to [facility]. She does seem more forgetful than baseline on exam today, but is otherwise her usual self. Staff states she improved over the weekend. Suspect stress and recent decline in health contributing. Labs today within patient baseline.   Plan: continue to monitor, continue current safety checks,     (R13.11) Oral phase dysphagia  Comment: Likely secondary to poor fitting dentures - had recent adjustment  Plan: SLP to follow, ok to advance diet as tolerated    (R63.4) Weight loss  Comment: Has lost about 15lbs in past 6 months.  Likely secondary to dysphagia as she does not like altered texture diet, also suspect overall decline contributing.   Plan: SLP to follow, consider dietary supplement if unable to advance diet.     (I10) Essential hypertension  (I50.9) Congestive heart failure, unspecified HF chronicity, unspecified heart failure type (H)  Comment: CHf appears compensated, BP Appears controlled, though on the lower end of normal today.   Plan: Will continue lasix, lisinopril, nifedipine - will check BP weekly times 2 weeks then adjust. Daily weights.     (D50.9) Iron deficiency anemia, unspecified iron deficiency anemia type  Comment: Hgb stable on check today, previous attempt to discontinue iron supplement resulted in worsening anemia. See labs.  Plan: Continue iron supplement.     (N18.3) CKD (chronic kidney disease) stage 3, GFR 30-59 ml/min (H)  Comment: Baseline creatinine 1-1.2, GFR 43-53, stable on labs today   Plan: Avoid nephrotoxic medications, monitor BMP PRN.     (R29.6) Recurrent falls  Comment: Likely secondary to overall decline, polypharmacy could certainly be contributing. Again discussed with patient risks of falls while on coumadin, which could result in serious injury or even death. She remains a high falls risk due to her significantly impaired mobility.   Plan:  Continue home PT and OT, staff to assist with all transfers. If repeatedly does not call for assistance will consider motion sensor for bathroom room and bedroom so staff notified if she is self transferring.     (I48.91) Atrial fibrillation, unspecified type (H)  (Z79.01) Long term current use of anticoagulant therapy  Comment: HR controlled, patient away of bleeding risk with falls and continued coumadin use, but wishes to continue use of coumadin at this time as she is more concerned about CVA. INR today is 2.9.  Plan: Continue coumadin dosing per [facility], other medications as above. Continue to monitor.     Orders written by provider at facility  Check BP weekly x 2 weeks then update Junie Palomino to advance diet as tolerated per recommendations of SLP      Electronically signed by:  MARY Gómez CNP               Sincerely,        MARY Gómez CNP

## 2020-06-29 NOTE — PROGRESS NOTES
[facility]  [place] [facility] Record Number:  [id number]  Place of Service where encounter took place:  [facility] [[id number]]  Chief Complaint   Patient presents with     RECHECK       HPI:    Jazmin Clifton  is a 87 year old (12/30/1932), who is being seen today for an episodic care visit.  HPI information obtained from: facility chart records, facility staff, patient report and [facility] chart review. Today's concern is:     Diarrhea, unspecified type  Nausea  Gastroesophageal reflux disease without esophagitis  Oral phase dysphagia  Weight loss  Essential hypertension  Congestive heart failure, unspecified HF chronicity, unspecified heart failure type (H)  Iron deficiency anemia, unspecified iron deficiency anemia type  CKD (chronic kidney disease) stage 3, GFR 30-59 ml/min (H)  Recurrent falls  Atrial fibrillation, unspecified type (H)  Long term current use of anticoagulant therapy  Intermittent confusion  Closed fracture of ramus of left pubis, initial encounter (H)   Patient seen today with [facility] nurse to follow-up on discharge from TCU last week. She had recently been hospitalized s/p fall, hypoxia with possible COPD exacerbation. Staff reports she was having abdominal discomfort last week and diarrhea. C.diff test ordered, but symptoms resolved and stool became formed prior to collection. Jazmin states she is not having any belly discomfort today, but continues to have intermittent belly pain and nausea. She had not noticed any pattern to this. She saw the dentist last week and ehr dentures were adjusted. She states she was not happy with her dentist visit because it was uncomfortable and she had a hard time getting to the dentist chair. She does not want to go back. Her gum pain has been getting better and she does think her dentures are fitting better. She has been on a mechanical soft diet, and is hoping that therapy will advance it. She does feel she  has had and choking or issues with her swallowing. Discussed that  Staff have noted coughing episodes while eating and that is concerning for aspiration. She does admit to these coughing episodes, but does not think it is due to swallowing issues.     Discussed again that she needs to call for help with all transfers due to her falls risk. She states she tries to remember, but sometime she forgets. She has been working with home care. They report she seemed more confused and weak last Friday, but seem more of herself today.     Past Medical and Surgical History reviewed in Epic today.    MEDICATIONS:    Current Outpatient Medications   Medication Sig Dispense Refill     acetaminophen (TYLENOL) 500 MG tablet Take 1,000 mg by mouth 3 times daily       atorvastatin (LIPITOR) 40 MG tablet TAKE 1 TABLET BY MOUTH ONCE DAILY 28 tablet PRN     benzocaine (ORAJEL/ANBESOL) 10 % gel Take by mouth 4 times daily as needed for moderate pain       bisacodyl (DULCOLAX) 10 MG suppository Place 10 mg rectally daily as needed for constipation       Blood Glucose Monitoring Suppl CORY 2 times daily Call nurse for further directions if blood glucose is less than 80 or greater than 250       BREO ELLIPTA 100-25 MCG/INH inhaler INHALE 1 PUFF BY MOUTH ONCE DAILY (Patient taking differently: Inhale 1 puff into the lungs daily ) 1 Inhaler 97     calcium carbonate (TUMS) 500 MG chewable tablet Take 1 chew tab by mouth 3 times daily as needed       DOK PLUS 50-8.6 MG tablet TAKE 1 TABLET BY MOUTH TWICE DAILY (Patient taking differently: Take 1 tablet by mouth 2 times daily ) 60 tablet 98     DULoxetine (CYMBALTA) 30 MG capsule Take 30 mg by mouth At Bedtime       DULoxetine (CYMBALTA) 60 MG capsule Take 1 capsule (60 mg) by mouth daily       Emollient (MOISTURIZING LOTION EX) Externally apply topically 2 times daily Bilateral lower extremeties       ferrous sulfate (FEROSUL) 325 (65 Fe) MG tablet Take 1 tablet (325 mg) by mouth daily 30 tablet  1     furosemide (LASIX) 20 MG tablet TAKE 1 TABLET BY MOUTH ONCE DAILY 30 tablet 98     gabapentin (NEURONTIN) 100 MG capsule Take 100 mg by mouth daily       HYDROmorphone (DILAUDID) 2 MG tablet TAKE 1 TABLET BY MOUTH TWICE DAILY AS NEEDED FOR SEVERE PAIN 12 tablet 0     hypromellose-dextran (GENTEAL TEARS) 0.1-0.3 % ophthalmic solution Place 1 drop into both eyes every 6 hours as needed for dry eyes       levalbuterol (XOPENEX) 1.25 MG/3ML neb solution Take 1 ampule by nebulization every 4 hours as needed for shortness of breath / dyspnea or wheezing And every 4 hours PRN       Lidocaine (LIDOCARE) 4 % Patch Place 1 patch onto the skin daily To desired area (shoulder or hip). On for 12hrs, off for 12hrs 30 patch 11     lisinopril (PRINIVIL/ZESTRIL) 20 MG tablet Take 1 tablet (20 mg) by mouth daily 30 tablet 11     Menthol, Topical Analgesic, (BIOFREEZE) 4 % GEL Externally apply topically 4 times daily as needed To left shoulder and right hip       NIFEdipine ER (ADALAT CC) 60 MG 24 hr tablet Take 60 mg by mouth daily        nystatin (MYCOSTATIN) 340201 UNIT/GM external powder Apply topically 3 times daily 1 Bottle 97     omeprazole (PRILOSEC) 40 MG DR capsule TAKE 1 CAPSULE BY MOUTH ONCE DAILY 28 capsule PRN     ondansetron (ZOFRAN) 4 MG tablet Take 4 mg by mouth every 6 hours as needed for nausea       oxyCODONE (OXYCONTIN) 10 MG 12 hr tablet TAKE ONE TABLET BY MOUTH EVERY 12 HOURS - DO NOT CRUSH 28 tablet 0     predniSONE (DELTASONE) 5 MG tablet TAKE 1 TABLET BY MOUTH ONCE DAILY 31 tablet PRN     tiZANidine (ZANAFLEX) 2 MG tablet Take 2 mg by mouth every 8 hours as needed        VITAMIN D3 25 MCG (1000 UT) tablet TAKE 1 TABLET BY MOUTH ONCE DAILY (Patient taking differently: Take 25 mcg by mouth daily ) 30 tablet 97     warfarin ANTICOAGULANT (COUMADIN) 1 MG tablet Take 2 tabs (2 mg) every MWF, and take 3 tabs (3 mg) all other days of the week or as directed by the Anticoagulation Clinic 90 tablet 1          REVIEW OF SYSTEMS:  4 point ROS including Respiratory, CV, GI and , other than that noted in the HPI,  is negative    Objective:  /69   Pulse 73   Temp 97.3  F (36.3  C)   Resp 18   Wt 78.5 kg (173 lb)   SpO2 94%   BMI 30.65 kg/m    Exam:  GENERAL APPEARANCE:  Alert, in no distress, cooperative  RESP:  respiratory effort and palpation of chest normal, lungs clear to auscultation , no respiratory distress  CV:  Palpation and auscultation of heart done , regular rate and rhythm, no murmur, rub, or gallop, peripheral edema 2-3+ in bilat feet adn ankles  ABDOMEN:  no guarding or rebound, bowel sounds normal, no distension, no masses  M/S:   non-ambulatory, decrease ROM to left shoulder  SKIN:  Inspection of skin and subcutaneous tissue baseline, exam limited as patient is fully clothed  NEURO:   speech clear, PETERSEN  PSYCH:  oriented X 3, affect and mood normal, forgetful    Labs:   Recent labs in [facility] reviewed by me today.  and   Most Recent 3 CBC's:  Recent Labs   Lab Test 06/29/20  1115 06/11/20  0720 06/03/20  0422   WBC 8.0 7.9 6.2   HGB 11.8 10.1* 9.8*   MCV 94 92 93    217 183     Most Recent 3 BMP's:  Recent Labs   Lab Test 06/29/20  1115 06/11/20  0720 06/03/20  0422    144 144   POTASSIUM 3.8 4.1 4.1   CHLORIDE 106 109 110*   CO2 30 30 30   BUN 39* 28 27   CR 1.22* 1.13* 1.11*   ANIONGAP 7 5 4   BLAIR 9.8 9.5 8.8   * 95 102*     Most Recent 3 INR's:  Recent Labs   Lab Test 06/29/20 06/22/20 06/19/20   INR 2.9* 1.8* 3.8*       ASSESSMENT/PLAN:  (R19.7) Diarrhea, unspecified type  (primary encounter diagnosis)  (R11.0) Nausea  (K21.9) Gastroesophageal reflux disease without esophagitis  Comment: Symptoms not present currently, suspect polypharmacy contributing, have reach out to pharmacy for medication review.   Plan: Continue omeprazole, continue PRN zofran, continue to monitor.     Intermittent confusion  Comment: Staff have reported increased forgetfulness and more  confusion conversation since return to [facility]. She does seem more forgetful than baseline on exam today, but is otherwise her usual self. Staff states she improved over the weekend. Suspect stress and recent decline in health contributing. Labs today within patient baseline.   Plan: continue to monitor, continue current safety checks,     (R13.11) Oral phase dysphagia  Comment: Likely secondary to poor fitting dentures - had recent adjustment  Plan: SLP to follow, ok to advance diet as tolerated    (R63.4) Weight loss  Comment: Has lost about 15lbs in past 6 months.  Likely secondary to dysphagia as she does not like altered texture diet, also suspect overall decline contributing.   Plan: SLP to follow, consider dietary supplement if unable to advance diet.     (I10) Essential hypertension  (I50.9) Congestive heart failure, unspecified HF chronicity, unspecified heart failure type (H)  Comment: CHf appears compensated, BP Appears controlled, though on the lower end of normal today.   Plan: Will continue lasix, lisinopril, nifedipine - will check BP weekly times 2 weeks then adjust. Daily weights.     (D50.9) Iron deficiency anemia, unspecified iron deficiency anemia type  Comment: Hgb stable on check today, previous attempt to discontinue iron supplement resulted in worsening anemia. See labs.  Plan: Continue iron supplement.     (N18.3) CKD (chronic kidney disease) stage 3, GFR 30-59 ml/min (H)  Comment: Baseline creatinine 1-1.2, GFR 43-53, stable on labs today   Plan: Avoid nephrotoxic medications, monitor BMP PRN.     (R29.6) Recurrent falls  Comment: Likely secondary to overall decline, polypharmacy could certainly be contributing. Again discussed with patient risks of falls while on coumadin, which could result in serious injury or even death. She remains a high falls risk due to her significantly impaired mobility.   Plan: Continue home PT and OT, staff to assist with all transfers. If repeatedly does not call  for assistance will consider motion sensor for bathroom room and bedroom so staff notified if she is self transferring.     (I48.91) Atrial fibrillation, unspecified type (H)  (Z79.01) Long term current use of anticoagulant therapy  Comment: HR controlled, patient away of bleeding risk with falls and continued coumadin use, but wishes to continue use of coumadin at this time as she is more concerned about CVA. INR today is 2.9.  Plan: Continue coumadin dosing per [facility], other medications as above. Continue to monitor.     Orders written by provider at facility  Check BP weekly x 2 weeks then update Junie Palomino to advance diet as tolerated per recommendations of SLP      Electronically signed by:  MARY Gómez CNP

## 2020-07-01 NOTE — PROGRESS NOTES
"MTM ENCOUNTER  SUBJECTIVE/OBJECTIVE:                           Jazmin Clifton is a 87 year old female called for a follow-up visit. She was referred to me from Junie Estrella NP.  Today's visit is a follow-up MTM visit from 2/3/20.      Patient consented to a telehealth visit: yes  Telemedicine Visit Details  Type of service:  Telephone visit  Start Time: 1pm  End Time: 1:47pm  Originating Location (pt. Location): Home  Distant Location (provider location):  [facility]  Mode of Communication:  Telephone    Chief Complaint: NP requested follow-up due to thought that nausea may be occurring due to medication.  Pt recently hospitalized early June following a fall;  Per hospital discharge:\" For the frequent vomiting  -this has been going on for months  -suspicious for a medication side effect.    -would consider trying to eliminate one medication after another to see which one is doing this, but this would take months.\"    Allergies/ADRs: Reviewed in EHR  Tobacco:  reports that she quit smoking about 28 years ago. She quit after 50.00 years of use. She has never used smokeless tobacco.  Alcohol: not currently using  Caffeine: 2-3 cups/day coffee and/or tea  Activity: wheelchair for ambulation; nonambulatory  PMH: Reviewed in EHR    Medication Adherence/Access: Patient has assistance with medication administration: assisted living.    HFpEF/HTN/CAD/Afib/h/o CVA: Current medications include lisinopril 20mg once daily, furosemide 20mg once daily and nifedipine ER 60mg once daily.  Also on Atorvastatin 40mg daily, and Warfarin for anticoagulation due to Afib.  Patient denies dizziness, h/o falls; had a fall that led to hospital as noted above.  Notes chest pain 3-4 x/day that \"takes my breath away, in my back\".  Denies SOB.  Occasional palpitations, and notes edema in lower extremities.  Bruises easily.  Followed by [facility] due to Warfarin/Afib.    ECHO:  Date 2/19/19, EF 55-60%    BP Readings from Last 3 " "Encounters:   06/29/20 109/69   06/17/20 (!) 154/81   06/09/20 132/78       Hyperlipidemia: Current therapy includes Atorvastatin 40mg once daily.  Does have muscle weakness that is thought secondary to deconditioning per previous notes.    Chronic pain:  Current medications include: Oxycontin 10mg twice daily, prn Hydromorphone (2mg twice daily prn), Tizanidine 2mg every 8hrs prn, Tylenol 1000mg three times daily, Duloxetine 60mg every am and 30mg at bedtime, Gabapentin 100mg daily, Biofreeze four times daily as needed.  Lidocaine patch to shoulder or hip daily.  Per chart review, Prednisone 5mg daily added for shoulder pain. Pain is described as terrible.  Will impact sleep at times. Potential side effects she is experiencing: peripheral edema, weakness, confusion, falls, nausea, although she notes she doesn't notice side effects after taking pain meds.  Notes pain is related to \"my sack that fills up\".  Per NP, she has a seroma that resulted following removal of mass from her groin, and pt has been unwilling to wear compression underwear to help with the swelling.  Oxycontin and prn hydromorphone somewhat effective per her report.  Patch helps more than anything.  Family supplies patch.  Notes muscle spasms, and tizanidine prn helpful for this.      GERD, Nausea: Current medications include: Prilosec (omeprazole) 40mg once daily and ondansetron 4mg every 8hrs prn, Tums 500mg three times daily as needed.   Notes continued nausea and vomiting that occurs any time of day; \"dry heaves.\"  Notes that it's phlegm that she vomits up, sometimes yellow, otherwise clear.  Notes she is eating, but not very much.  Not skipping meals, but asking for small portions, and notes this is how she has always been.  Notes she doesn't usually snack, and if she does, it is juice, not solid foods.  Unclear if she is always taking meds with food.    Dementia:  Current medications include none. Patient reported at a previous visit that " "memory issues began right after a surgery that took longer than expected.  Notes today that she is feeling more confused, is upset because she can't write her name; has been working with OT.  Previously on Donepezil and this was dc'd on 3/3/20 due to potentially contributing to nausea and vomiting, GERD symptoms.  As noted above, these symptoms have continued.    COPD: Current medications: Short-Acting Bronchodilator: Levalbuterol Nebs prn  ICS/LABA- Breo 1 puff(s) once daily. Pt rinses their mouth after using steroid inhaler.- \"most of the time\".  States she is not using prn nebs.    Denies coughing, \"once in a great while to clear my throat\", denies SOB and wheezing.  Notes pneumonia 2x this year.    Osteoporosis: Current medication includes vitamin D3 1000u daily.  Drinks milk 1 glass daily in the morning.  Previously on Alendronate and this was dc'd due to kidney function and potentially contributing to gi adverse effects.  As noted above, no longer ambulatory.  Vitamin D level on 3/19/20 = 42.    Anemia: Current medication includes Ferrous Sulfate 325mg daily.  Was stopped previously in January 2020 per patient request, but restarted in March 2020 while in the hospital due to worsening Hgb.    Constipation: Current medication includes Senna-S 2 tabs twice daily, Bisacodyl supp daily prn.  Denies constipation currently.      Estimated Creatinine Clearance: 32.2 mL/min (A) (based on SCr of 1.22 mg/dL (H)).      ASSESSMENT:                              Medication Adherence: good, no issues identified    HFpEF/HTN/CAD/Afib/h/o CVA: Stable.  BP goal <150/90mmHg.  Did not discuss this today with pt, but in future, may be of benefit to consider switch from Nifedipine ER to Metoprolol ER due to ability of CCB to induce/exacerbate heart failure.  Nifedipine ER may be contributing to peripheral edema.    Hyperlipidemia: Stable.    Chronic pain:  Needs improvement.  Discussed high risk meds with pt; combo of narcotics, " Gabapentin, Tizanidine increases risk further of CNS and respiratory depression, as well as confusion, falls, etc.  Is concerned about her confusion, and is willing to d'c Gabapentin and consider further reduction in pain meds in future.  Discussed that Gabapentin may also contribute to her edema.  May also benefit from scheduled use of Biofreeze at night.    GERD, Nausea:  Discussed that her meds can be taken with food, except Omeprazole typically given prior to breakfast.  Unclear if there are times where she is taking meds on an empty stomach, and if so, this may contribute to gi adverse effects.  Encouraged her to make sure she is taking meds with food.  Discussed that reduction in meds where able may also be of benefit for reducing nausea & vomiting.     Dementia: Per pt and NP report, appears pt more confused.  Although possible that d'c of Donepezil in March may contribute, likely was not adding much benefit, and would not recommend restarting due to potential for it to worsen gi symptoms/certainly wouldn't help gi symptoms.  Would instead recommend slowly working at reducing meds that may be contributing to confusion, such as Gabapentin, Tizanidine, narcotics.    COPD: Stable.  Encouraged her to rinse mouth out with water every time after using Breo Ellipta to avoid thrush.    Osteoporosis: Stable.    Anemia: Stable.    Constipation: Stable.    PLAN:                          Post Discharge Medication Reconciliation Status: discharge medications reconciled, continue medications without change. - see recommendations below from today's visit    1.  Provider may consider d'c Gabapentin.  2.  Provider may consider scheduling Biofreeze to painful areas at night.  3.  Patient to rinse mouth out with water after Breo Ellipta use.  Provider may consider adding these instructions so facility staff will help pt remember.  4.  Do not take medications on an empty stomach.  Ok to take Omeprazole 30-60min prior to  breakfast, however.    Future considerations:  Reduction in Oxycontin   Reduction in Tizanidine to 1mg every 8hr prn  Potential switch from Nifedipine ER to Metoprolol ER    I spent 47 minutes with this patient today. A copy of the visit note was provided to the patient's referring provider.    Will follow up in 3-4 weeks, sooner if necessary.    The patient declined a summary of these recommendations.     Uma Webber Pharm.D.,[facility]  Board Certified Geriatric Pharmacist  Medication Therapy Management Pharmacist  [phone]

## 2020-07-02 NOTE — PROGRESS NOTES
CC received update CP from [facility] with increased services from [facility].  CC also received update from Reena on member that she is now going to dining room so that staff can be present when she is eating due to choking concerns while at the TCU.   Member has also been found to be transferring on her own without help from staff so RN and staff will continue to remind member to call for this assistance when needed.     RS tool is updated and increase authorized and CC will f/u as needed.   Mirian Weldon MA [facility] Care Coordinator   [phone] - dnsi cell phone   [phone] - gznf fax

## 2020-07-06 NOTE — PROGRESS NOTES
[facility] Care Coordination Contact    CL tool uploaded, auth submitted to health plan & [employer] updated.    Analisa Dorsey  Care Management Specialist   [facility]   [phone]

## 2020-07-07 NOTE — PROGRESS NOTES
ANTICOAGULATION MANAGEMENT     Patient Name:  Jazmin Clifton  Date:  7/7/2020    ASSESSMENT /SUBJECTIVE:    Today's INR result of 3.0 is therapeutic. Goal INR of 2.0-3.0      Warfarin dose taken: Warfarin taken as previously instructed    Diet: No new diet changes affecting INR    Medication changes/ interactions: No new medications/supplements affecting INR    Previous INR: Therapeutic     S/S of bleeding or thromboembolism: No    New injury or illness: No    Upcoming surgery, procedure or cardioversion: No    Additional findings: None      PLAN:    Spoke with MANI Shrestha RN regarding INR result and instructed:     Warfarin Dosing Instructions: Continue your current warfarin dose 2 mg MWF; 3 mg ROW  PRN INR continues to climb will consider 3 mg MWF, 2 mg ROW    Instructed patient to follow up no later than: 1 week  Orders given to  Homecare nurse/facility to recheck    Education provided: None required      Henrietta verbalizes understanding and agrees to warfarin dosing plan.  AVS faxed to Yessy ELLISON      Instructed to call the Anticoagulation Clinic for any changes, questions or concerns. (#[phone])        OBJECTIVE:  INR   Date Value Ref Range Status   07/07/2020 3.0 (A) 0.90 - 1.10 Final         INR assessment THER    Recheck INR In: 1 WEEK    INR Location Homecare INR      Anticoagulation Summary  As of 7/7/2020    INR goal:   2.0-3.0   TTR:   77.9 % (3.4 mo)   INR used for dosing:   3.0 (7/7/2020)   Warfarin maintenance plan:   2 mg (1 mg x 2) every Mon, Wed, Fri; 3 mg (1 mg x 3) all other days   Full warfarin instructions:   2 mg every Mon, Wed, Fri; 3 mg all other days   Weekly warfarin total:   18 mg   Plan last modified:   Michelle Alvarez RN (3/31/2020)   Next INR check:   7/14/2020   Priority:   High   Target end date:   3/11/2021    Indications    Paroxysmal atrial fibrillation (H) [I48.0]  Atrial fibrillation  unspecified type (H) [I48.91]  History of DVT (deep vein thrombosis) [Z86.718]  CVA  (cerebral vascular accident) (H) (Resolved) [I63.9]             Anticoagulation Episode Summary     INR check location:       Preferred lab:       Send INR reminders to:   GUSTAVO FARAH    Comments:   History of CVA 2017 s/p endarterectomy in 2017 Previous clipping of aneurysm. [facility]. Lives at [facility]. Facility lab days are MON/THURS. Fax orders to [phone] Phone: [phone]      Anticoagulation Care Providers     Provider Role Specialty Phone number    Junie Estrella APRN CNP Responsible Nurse Practitioner - Gerontology [phone]

## 2020-07-14 NOTE — PROGRESS NOTES
ANTICOAGULATION MANAGEMENT     Patient Name:  Jazmin Clifton  Date:  2020    ASSESSMENT /SUBJECTIVE:    Today's INR result of 2.6 is therapeutic. Goal INR of 2.0-3.0      Warfarin dose taken: Warfarin taken as previously instructed    Diet: No new diet changes affecting INR    Medication changes/ interactions: No new medications/supplements affecting INR    Previous INR: Therapeutic     S/S of bleeding or thromboembolism: No    New injury or illness: No    Upcoming surgery, procedure or cardioversion: No    Additional findings: None      PLAN:    Spoke with Sarah [facility] Nurse regarding INR result and instructed:     Warfarin Dosing Instructions: Continue your current warfarin dose 2 mg MWF, 3 mg all other days    Instructed patient to follow up no later than: 1 week  Orders given to  Homecare nurse/facility to recheck    Education provided: Monitoring for bleeding signs and symptoms and Monitoring for clotting signs and symptoms      Nurse Sarah verbalizes understanding and agrees to warfarin dosing plan.    Instructed to call the Anticoagulation Clinic for any changes, questions or concerns. (#[phone])        Arely Marino RN      OBJECTIVE:  Recent labs: (last 7 days)     20   INR 2.6*         No question data found.  Anticoagulation Summary  As of 2020    INR goal:   2.0-3.0   TTR:   79.3 % (3.6 mo)   INR used for dosin.6 (2020)   Warfarin maintenance plan:   2 mg (1 mg x 2) every Mon, Wed, Fri; 3 mg (1 mg x 3) all other days   Full warfarin instructions:   2 mg every Mon, Wed, Fri; 3 mg all other days   Weekly warfarin total:   18 mg   No change documented:   Arely Marino RN   Plan last modified:   Michelle Alvarez RN (3/31/2020)   Next INR check:   2020   Priority:   High   Target end date:   3/11/2021    Indications    Paroxysmal atrial fibrillation (H) [I48.0]  Atrial fibrillation  unspecified type (H) [I48.91]  History of DVT (deep vein thrombosis) [Z86.718]  CVA  (cerebral vascular accident) (H) (Resolved) [I63.9]             Anticoagulation Episode Summary     INR check location:       Preferred lab:       Send INR reminders to:   GUSTAVO FARAH    Comments:   History of CVA 2017 s/p endarterectomy in 2017 Previous clipping of aneurysm. [facility]. Lives at [facility]. Facility lab days are MON/THURS. Fax orders to [phone] Phone: [phone]      Anticoagulation Care Providers     Provider Role Specialty Phone number    Junie Estrella APRN CNP Responsible Nurse Practitioner - Gerontology [phone]

## 2020-07-15 NOTE — ED AVS SNAPSHOT
[facility]  [address] [address]  Phone:  [phone]  Fax:  [phone]                                    Jazmin Clifton   MRN: [id number]    Department:  [facility]   Date of Visit:  7/15/2020           After Visit Summary Signature Page    I have received my discharge instructions, and my questions have been answered. I have discussed any challenges I see with this plan with the nurse or doctor.    ..........................................................................................................................................  Patient/Patient Representative Signature      ..........................................................................................................................................  Patient Representative Print Name and Relationship to Patient    ..................................................               ................................................  Date                                   Time    ..........................................................................................................................................  Reviewed by Signature/Title    ...................................................              ..............................................  Date                                               Time          22EPIC Rev 08/18

## 2020-07-16 NOTE — ED PROVIDER NOTES
History     Chief Complaint   Patient presents with     Chest Pain     HPI  Jazmin Clifton is a 87 year old female wh presents with a history of chronic kidney disease, carotid stenosis, cognitive impairment, coronary disease, renovascular hypertension, nonruptured cerebral aneurysm, with chest pain.  Presents having been checked by staff this evening reported midsternal chest pain no radiation no shortness of breath.  Recent fall with back pain.  No chest pain at this facility.    She tells me that she had an hour of chest pain while sitting in her rocker pain persisted for 1 hour.  No modifying factors.  No radiation of the anterior chest pain moderate intensity.  Resolved spontaneously and no pain now.  No associated dyspnea beyond her chronic dyspnea.  No nausea vomiting or sweats.  She has lower back pain since recent fall in June but she also notes chronic history of back pain notes this is not significantly dissimilar to her prior back pain.  She has no cough congestion upper respiratory symptoms.    To this facility in June when she had a fall secondary to weakness and deconditioning, on chronic opioids for pain, .  On chronic anticoagulation.  imaging was reassuring after that fall.    Allergies:  Allergies   Allergen Reactions     Accupril Other (See Comments)     Dizziness       Ace Inhibitors Other (See Comments)     Accupril - dizziness     Augmentin Unknown     Blood-Group Specific Substance Other (See Comments)     Patient has a Non-specific antibody. Blood Product orders may be delayed.  Draw one red top and two purple top tubes for ALL Type and Screen/ Type and Crossmatch orders.      Levofloxacin Other (See Comments) and Muscle Pain (Myalgia)     Pt prescribed ciprofloxacin in Jan 2018 (no rxn documented)         Morphine Other (See Comments)     hallucinations     Nitrofurantoin Nausea and Vomiting     Norvasc [Amlodipine Besylate] Swelling     Leg swelling       Quinapril Other (See Comments)      Hypertension. 3.29.18 - Takes and tolerates lisinopril           Problem List:    Patient Active Problem List    Diagnosis Date Noted     Fall 2020     Priority: Medium     Paroxysmal atrial fibrillation (H) 2020     Priority: Medium     Atrial fibrillation, unspecified type (H) 2020     Priority: Medium     Vomiting and diarrhea 2020     Priority: Medium     Chronic pain syndrome 2019     Priority: Medium     Lymphedema 2019     Priority: Medium     Pubic ramus fracture (H) 2019     Priority: Medium     Acute dysfunction of right eustachian tube 2019     Priority: Medium     Coronary artery disease of native heart with stable angina pectoris, unspecified vessel or lesion type (H) 2019     Priority: Medium     Sialadenitis 2019     Priority: Medium     Post-operative state 06/10/2019     Priority: Medium     Mass of urethra 2019     Priority: Medium     Gross hematuria 2019     Priority: Medium     Anemia of chronic renal failure, stage 4 (severe) (H) 2019     Priority: Medium     Health Care Home 2019     Priority: Medium     [facility] Care Coordinator  Mirian Weldon MA, LSW  [phone]    [facility] CARE PLAN SUMMARY    Member Name:  Jazmin Clifton    Address:  [place] [address]     Phone: [phone] (home)    :  1932 Date of Assessment: 3-12-20   Health Plan:  [facility]  Health Plan Number: [id number] Medical Assistance Number: [id number]  Financial Worker:  [facility]   Case #:     [facility] Care Coordinator:  Mirian Weldon MA, LSW CC Phone:  [phone]  CC Fax:  [phone]   FVP Enrollment Date: 19 Case Mix:  H  Rate Cell:  E  Waiver Type: EW     Primary Emergency Contact:  Chi Clifton  Address: [address] [place] [address]  Mobile Phone: [phone]  Relation: Son    Secondary Emergency Contact: Lucius Clifton  Address:  "[address] [place] [address]   Home Phone: [phone]  Work Phone: [phone]  Relation: Son Language:  English  :  No   Health Care Agent/POA:   Advanced Directives/Living Will:     Primary Care Clinic/Phone/Fax:  [facility]/(p) [phone], (f) [phone] Primary Dx:  COPD J44.9  Secondary Dx: DDD M51.36  Cognitive Impairment G31.84   Primary Physician:  Junie Estrella   Height:  5' 3\"  Weight:  188 lbs   Specialty Physician:    Audiologist:     Eye Care Provider:   Dental Care Provider:  [facility]   [facility] [phone]   Other:        [facility] CURRENT SERVICES SUMMARY  Equipment owned/D    ME history:  Member son purchased electric wheelchair for member;    Member has scooter, lift chair, [employer]  - transfer pole 2-20 installed   SERVICE TYPE/PROVIDER NAME/PHONE AUTH DATE FREQUENCY Units OR $ Amt DESCRIPTION   Medical Transportation: [facility] [phone]  Fax:  3-1-20 thru 2-28-21  Review annually/PRN   as needed     Assisted Living: [facility] [address] (Assisted Living) [phone] 24 hr Customized Living  Fax:  3-1-20 thru 2-28-21  Review annually/PRN    7-1-20 increase to RS tool  daily See RS/AL Tool           Increased support after TCU stay      Supplies: [employer] [phone]  Fax:  3-1-20 thru 2-28-21  Review annually/PRN            6-26-20 thru 8-31-20   Monthly                  One time order          1 pull up per day   1 liner per day              119.00      106.00 XL pull ups     Regular liners               BED RAIL -STANDER 5000      INSTALLATION OF GENERAL PRODUCT     HHA/RN: [facility] [phone]   3-5-20 thru 6-30-20  Review annually/PRN 2x week 277.68 In addition to staffing at AL due to member increased need since last hospital stay                        Hip pain 10/29/2018     Priority: Medium     Impaired ambulation 10/29/2018     Priority: Medium "     Morbid obesity (H) 06/19/2018     Priority: Medium     Multiple closed fractures of metatarsal bone 05/28/2018     Priority: Medium     Right groin mass 05/03/2018     Priority: Medium     Chronic diastolic congestive heart failure (H) 02/02/2018     Priority: Medium     History of stroke 02/02/2018     Priority: Medium     Mild cognitive impairment 09/22/2017     Priority: Medium     S/P carotid endarterectomy 09/06/2017     Priority: Medium     Underwent for symptomatic right carotid artery stenosis        Carotid stenosis, symptomatic w/o infarct, right 08/31/2017     Priority: Medium     Thyroid nodule 08/23/2017     Priority: Medium     Trochanteric bursitis of right hip 08/23/2017     Priority: Medium     CKD (chronic kidney disease) stage 3, GFR 30-59 ml/min (H) 08/16/2017     Priority: Medium     Transient cerebral ischemia, unspecified type 08/01/2017     Priority: Medium     Chronic obstructive pulmonary disease, unspecified COPD type (H) 07/25/2017     Priority: Medium     Osteoarthritis of right hip, unspecified osteoarthritis type 07/25/2017     Priority: Medium     Retention of urine 04/15/2017     Priority: Medium     History of intracranial aneurysm 04/14/2017     Priority: Medium     Postherpetic neuralgia 11/14/2016     Priority: Medium     Umbilical hernia without obstruction and without gangrene 06/03/2016     Priority: Medium     Spinal stenosis of lumbar region 01/11/2016     Priority: Medium     Spondylolisthesis, lumbar region 01/11/2016     Priority: Medium     BPPV (benign paroxysmal positional vertigo) 11/27/2014     Priority: Medium     Dyspepsia 11/27/2014     Priority: Medium     Female stress incontinence 11/27/2014     Priority: Medium     History of bradycardia 11/27/2014     Priority: Medium     History of DVT (deep vein thrombosis) 11/27/2014     Priority: Medium     History of herpes zoster 11/27/2014     Priority: Medium     Constipation 10/20/2012     Priority: Medium      Rectocele 08/31/2012     Priority: Medium     Hip joint replacement status 04/18/2012     Priority: Medium     Osteoarthritis 04/18/2012     Priority: Medium     DDD (degenerative disc disease), lumbar 04/18/2012     Priority: Medium     Mild major depression (H) 04/18/2012     Priority: Medium     Incontinence of urine 04/18/2012     Priority: Medium     Hyperlipidemia 12/07/2011     Priority: Medium     Osteoporosis 10/25/2011     Priority: Medium     S/P laminectomy 10/25/2011     Priority: Medium     CARDIOVASCULAR SCREENING; LDL GOAL LESS THAN 100 10/31/2010     Priority: Medium     Normocytic anemia 02/17/2010     Priority: Medium     Restrictive lung disease 09/17/2008     Priority: Medium     PFT 4/2008       Renovascular hypertension 11/09/2006     Priority: Medium     Problem list name updated by automated process. Provider to review       Benign neoplasm of colon 10/04/2006     Priority: Medium     Angiodysplasia - due for repeat 10 years.  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 NO edema. Continue current Therapy.HYPERTENSION: Dynacirc CR 10mg daily initiated today. Will continue adjusting blood pressure medicatins as needed until readings at target.VITAMIN D  DEFICIENCY - Completed therapy, discontinue ergocalciferol.ANEMIA, EPO DEFICIENCY - Hgb 10.2. Not on EPO. Continue observation for now. Recnet Hgb drop due to lumbar laminectomy.  Problem list name updated by automated process. Provider to review       Essential hypertension, benign 05/01/2006     Priority: Medium     Esophageal reflux      Priority: Medium      Gastroesophageal Reflux Disease       Cerebral aneurysm, nonruptured      Priority: Medium     Cerebral Aneurysm - unchanged on rcent MRI.  Seen by neurosurg.  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     Priority: Medium        Past Medical History:    Past Medical History:   Diagnosis Date     ABDOMINAL PAIN GENERALIZED 3/15/2006     Abdominal pain, generalized 3/15/2006     Atherosclerosis of renal artery (H)      BENIGN HYPERTENSION 5/1/2006     Benign neoplasm of scalp and skin of neck      Cerebral aneurysm, nonruptured      Congestive heart failure (H)      Depressive disorder, not elsewhere classified      Esophageal reflux      Female stress incontinence      Gastrointestinal malfunction arising from mental factors      Generalized osteoarthrosis, unspecified site      Herpes zoster dermatitis of eyelid      Insomnia, unspecified      Lumbago      Other chest pain      Other specified cardiac dysrhythmias(427.89)      Rectocele      Unspecified disorder of kidney and ureter      Unspecified essential hypertension        Past Surgical History:    Past Surgical History:   Procedure Laterality Date     CHOLECYSTECTOMY, LAPOROSCOPIC  1997    Cholecystectomy, Laparoscopic     CYSTOSCOPY, BIOPSY URETHRA N/A 6/10/2019    Procedure: Cystoscopy With Biopsy, Removal Of Urethral Lesion;  Surgeon: Yesy Fong MD;  Location: [facility] OR     ENDARTERECTOMY CAROTID Right 8/31/2017    Procedure: ENDARTERECTOMY CAROTID;  RIGHT CAROTID ENDARTERECTOMY WITH EEG;  Surgeon: Hilario Parry MD;  Location: [facility] OR     HYSTERECTOMY, RAYMOND  1982    oophorectomy,RAYMOND     SURGICAL HISTORY OF -       Laminectomy x 3     SURGICAL HISTORY OF -       MCA Aneurysm repair     SURGICAL HISTORY OF -   1996    Bladder suspension (Bladder Repair x2)      SURGICAL HISTORY OF -       Bilateral Hand Surgeries for Arthritis x2     SURGICAL HISTORY OF -       Repair of a Cerebral Aneurysm     URETHROPLASTY N/A 6/10/2019    Procedure: Urethral Reconstruction;  Surgeon: Yesy Fong MD;  Location: [facility] OR       Family History:    Family History   Problem Relation Age of Onset     Cancer Mother         stomache     Cerebrovascular Disease Father      Heart Disease Father      Cancer Brother         lymphoma     Eye Disorder Son      Asthma Son      Diabetes Son      Gastrointestinal Disease Son         no control over bladder or bowels     C.A.D. No family hx of      Hypertension No family hx of      Breast Cancer No family hx of      Cancer - colorectal No family hx of      Prostate Cancer No family hx of        Social History:  Marital Status:   [5]  Social History     Tobacco Use     Smoking status: Former Smoker     Years: 50.00     Last attempt to quit: 1992     Years since quittin.5     Smokeless tobacco: Never Used   Substance Use Topics     Alcohol use: Not Currently     Comment: wine occas.     Drug use: No        Medications:    acetaminophen (TYLENOL) 500 MG tablet  atorvastatin (LIPITOR) 40 MG tablet  benzocaine (ORAJEL/ANBESOL) 10 % gel  bisacodyl (DULCOLAX) 10 MG suppository  Blood Glucose Monitoring Suppl CORY  BREO ELLIPTA 100-25 MCG/INH inhaler  calcium carbonate (TUMS) 500 MG chewable tablet  DULoxetine (CYMBALTA) 30 MG capsule  DULoxetine (CYMBALTA) 60 MG capsule  Emollient (MOISTURIZING LOTION EX)  ferrous sulfate (FEROSUL) 325 (65 Fe) MG tablet  furosemide (LASIX) 20 MG tablet  gabapentin (NEURONTIN) 100 MG capsule  HYDROmorphone (DILAUDID) 2 MG tablet  hypromellose-dextran (GENTEAL TEARS) 0.1-0.3 % ophthalmic solution  levalbuterol (XOPENEX) 1.25 MG/3ML neb solution  Lidocaine (LIDOCARE) 4 % Patch  lisinopril (PRINIVIL/ZESTRIL) 20 MG tablet  Menthol, Topical Analgesic, (BIOFREEZE) 4 % GEL  NIFEdipine ER (ADALAT CC) 60 MG 24 hr  "tablet  nystatin (MYCOSTATIN) 442387 UNIT/GM external powder  omeprazole (PRILOSEC) 40 MG DR capsule  ondansetron (ZOFRAN) 4 MG tablet  oxyCODONE (OXYCONTIN) 10 MG 12 hr tablet  predniSONE (DELTASONE) 5 MG tablet  senna-docusate (DOK PLUS) 8.6-50 MG tablet  tiZANidine (ZANAFLEX) 2 MG tablet  VITAMIN D3 25 MCG (1000 UT) tablet  warfarin ANTICOAGULANT (COUMADIN) 1 MG tablet          Review of Systems   Constitutional: Negative for chills, diaphoresis and fever.   HENT: Negative for ear pain, sinus pressure and sore throat.    Eyes: Negative for visual disturbance.   Respiratory: Positive for shortness of breath (chronic). Negative for cough and wheezing.    Cardiovascular: Positive for chest pain. Negative for palpitations.   Gastrointestinal: Negative for abdominal pain, blood in stool, constipation, diarrhea, nausea and vomiting.   Genitourinary: Negative for dysuria, frequency and urgency.   Skin: Negative for rash.   Neurological: Negative for headaches.   All other systems reviewed and are negative.         Physical Exam   BP: (!) 158/82  Pulse: 75  Heart Rate: 73  Temp: 97.5  F (36.4  C)  Resp: 20  Height: 160 cm (5' 3\")  Weight: 77.1 kg (170 lb)  SpO2: 94 %      Physical Exam  Constitutional:       General: She is not in acute distress.     Appearance: She is not diaphoretic.   HENT:      Head: Atraumatic.   Eyes:      Conjunctiva/sclera: Conjunctivae normal.   Neck:      Musculoskeletal: Neck supple.   Cardiovascular:      Rate and Rhythm: Normal rate.      Pulses: Normal pulses.      Heart sounds: No murmur.   Pulmonary:      Effort: Pulmonary effort is normal. No respiratory distress.      Breath sounds: Normal breath sounds. No stridor. No wheezing or rhonchi.   Chest:      Chest wall: Tenderness present.   Abdominal:      General: Abdomen is flat. Bowel sounds are normal. There is no distension.      Palpations: There is no mass.      Tenderness: There is no abdominal tenderness. There is no guarding. "   Musculoskeletal:      Right lower leg: No edema.      Left lower leg: No edema.   Skin:     Coloration: Skin is not pale.      Findings: No rash.   Neurological:      General: No focal deficit present.      Mental Status: She is alert.         ED Course        Procedures                 EKG Interpretation:      Interpreted by Roberto Emery MD  EKG done at 2046 hrs. demonstrates a sinus rhythm at 74 bpm with a leftward axis and no ST change.  T wave inversion in lead III and aVF.  There is a normal R progression.  Cheerier Q waves.  Normal intervals.  Normal conduction.  No ectopy.  Impression sinus rhythm 74 bpm likely prior inferior MI.  This is similar to EKG done June 1, 2020    Critical Care time:  none               Results for orders placed or performed during the hospital encounter of 07/15/20 (from the past 24 hour(s))   CBC with platelets differential   Result Value Ref Range    WBC 7.9 4.0 - 11.0 10e9/L    RBC Count 4.23 3.8 - 5.2 10e12/L    Hemoglobin 11.9 11.7 - 15.7 g/dL    Hematocrit 39.1 35.0 - 47.0 %    MCV 92 78 - 100 fl    MCH 28.1 26.5 - 33.0 pg    MCHC 30.4 (L) 31.5 - 36.5 g/dL    RDW 13.8 10.0 - 15.0 %    Platelet Count 237 150 - 450 10e9/L    Diff Method Automated Method     % Neutrophils 74.1 %    % Lymphocytes 15.3 %    % Monocytes 8.8 %    % Eosinophils 1.0 %    % Basophils 0.4 %    % Immature Granulocytes 0.4 %    Nucleated RBCs 0 0 /100    Absolute Neutrophil 5.8 1.6 - 8.3 10e9/L    Absolute Lymphocytes 1.2 0.8 - 5.3 10e9/L    Absolute Monocytes 0.7 0.0 - 1.3 10e9/L    Absolute Eosinophils 0.1 0.0 - 0.7 10e9/L    Absolute Basophils 0.0 0.0 - 0.2 10e9/L    Abs Immature Granulocytes 0.0 0 - 0.4 10e9/L    Absolute Nucleated RBC 0.0    Comprehensive metabolic panel   Result Value Ref Range    Sodium 139 133 - 144 mmol/L    Potassium 3.8 3.4 - 5.3 mmol/L    Chloride 105 94 - 109 mmol/L    Carbon Dioxide 29 20 - 32 mmol/L    Anion Gap 5 3 - 14 mmol/L    Glucose 136 (H) 70 - 99 mg/dL    Urea  Nitrogen 24 7 - 30 mg/dL    Creatinine 1.18 (H) 0.52 - 1.04 mg/dL    GFR Estimate 41 (L) >60 mL/min/[1.73_m2]    GFR Estimate If Black 48 (L) >60 mL/min/[1.73_m2]    Calcium 9.6 8.5 - 10.1 mg/dL    Bilirubin Total 0.4 0.2 - 1.3 mg/dL    Albumin 3.2 (L) 3.4 - 5.0 g/dL    Protein Total 6.6 (L) 6.8 - 8.8 g/dL    Alkaline Phosphatase 109 40 - 150 U/L    ALT 15 0 - 50 U/L    AST 12 0 - 45 U/L   Troponin I   Result Value Ref Range    Troponin I ES 0.021 0.000 - 0.045 ug/L   INR   Result Value Ref Range    INR 2.66 (H) 0.86 - 1.14   Chest XR,  PA & LAT    Narrative    CHEST TWO VIEWS    7/15/2020 10:04 PM     HISTORY: Chest pain    COMPARISON: Chest x-ray 3/9/2020.      Impression    IMPRESSION: Two views of the chest. Calcified granuloma left upper  lung is stable. Lungs are hypoaerated and there is elevation of the  right hemidiaphragm. Heart is enlarged but unchanged. Right lung base  atelectasis is present. Lungs are otherwise unchanged compared to  prior study. No significant pleural effusions. No evidence of  pneumothorax.    CAMILLE SCHAEFFER MD   Troponin I   Result Value Ref Range    Troponin I ES 0.030 0.000 - 0.045 ug/L       Medications - No data to display    Assessments & Plan (with Medical Decision Making)     MDM:Jazmin Clifton is a 87 year old female wh presents with a history of chronic kidney disease, carotid stenosis, cognitive impairment, coronary disease, renovascular hypertension, nonruptured cerebral aneurysm, with chest pain.  Presents having been checked by staff this evening reported midsternal chest pain no radiation no shortness of breath. '  She presents here without pain.  Is hypoxic on room air to 87%.  No acute distress.  History of COPD.  She has no chest pain.  Troponin x1 is normal.  EKG shows no change from prior.  He is not on home oxygen hypoxia is new.  Will give DuoNeb check a chest x-ray, and then recheck oxygen level on room air.  Also recheck troponin at 2 hours after the first.  Still  hypoxic will acquire hospitalization observation stay to initiate supplemental oxygen as she has not previously on this.  In addition may consider rechecking serial troponins although I asked the patient about interventions cardiac and she would prefer medical management.  She does have a POLST form that demonstrates limited interventions DNR/DNI.    Evaluation was reassuring with 2 serial troponins that were reassuring and chest x-ray without infiltrate.  Chest x-ray is with poor inspiration but similar to prior chest x-ray.  No obvious acute changes.    The patient has had no chest pain while in the emergency department.  She had mild hypoxia initially but improved after DuoNeb.    I spoke with the patient's son Lucius by phone.  Discussed the reassuring findings.  We discussed that in some cases given her multiple comorbidities that observation may be reasonable.  In addition however it would also be reasonable to discharge back to [facility] and to reevaluate for any recurrent chest pain.  Both Lucius and Jazmin feel comfortable returning back to home.  She will be transported by wheelchair transport.  Cautions are given for return.  Following up with primary provider.      I have reviewed the nursing notes.    I have reviewed the findings, diagnosis, plan and need for follow up with the patient.       New Prescriptions    No medications on file       Final diagnoses:   Chest pain, unspecified type - unclear cause.  no serious findings.  Return for recurrent chest pain, increased shjortness of breath or worsening. follow-up primary provider.       7/15/2020   [facility] EMERGENCY DEPARTMENT     Roberto Emery MD  07/16/20 0004

## 2020-07-16 NOTE — TELEPHONE ENCOUNTER
ANTICOAGULATION  MANAGEMENT: Discharge Review    Jazminnancy Clifton chart reviewed for anticoagulation continuity of care    Emergency room visit on 7/16 for Chest Pain.    Discharge disposition: TCU    Results:    Recent labs: (last 7 days)     07/14/20 07/15/20  2044   INR 2.6* 2.66*     Anticoagulation inpatient management:     home regimen continued    Anticoagulation discharge instructions:     Warfarin dosing: home regimen continued   Bridging: No   INR goal change: No      Medication changes affecting anticoagulation: No    Additional factors affecting anticoagulation: No    Plan     No adjustment to anticoagulation plan needed    Patient not contacted    No adjustment to Anticoagulation Calendar was required    Arely Marino, RN

## 2020-07-16 NOTE — DISCHARGE INSTRUCTIONS
ICD-10-CM    1. Chest pain, unspecified type  R07.9     unclear cause.  no serious findings.  Return for recurrent chest pain, increased shjortness of breath or worsening. follow-up primary provider.

## 2020-07-16 NOTE — ED TRIAGE NOTES
Pt was checked on by staff, asked how she was doing, which she then reported she had chest pain. Pain midsternal. No radiation. No SOB.     Pt arrives to department CP free. Pt does have back pain from recent fall, rated 3/10.

## 2020-07-16 NOTE — ED NOTES
Pt 02 saturation stable on room air since having neb treatment. Pt states she has neb machine at home to use PRN, though hasn't felt she needed to use it lately.

## 2020-07-21 NOTE — PROGRESS NOTES
"[facility]   Tamra Clifton is being evaluated via a billable video visit due to the restrictions of the Covid-19 pandemic.   The patient has been notified of following:  \"This video visit will be conducted via a call between you and your provider. We have found that certain health care needs can be provided without the need for an in-person physical exam.  This service lets us provide the care you need with a video conversation. If during the course of the call the provider feels a video visit is not appropriate, you will not be charged for this service.\"   The provider has received verbal consent for a Video Visit from the patient or first contact? Yes  Patient  or facility staff would like the video invitation sent by: N/A   Video Start Time: 1038    [facility] Record Number:  [id number]  Place of Location at the time of visit: [facility]  Chief Complaint   Patient presents with     RECHECK     HPI:  Tamra Clifton  is a 87 year old (12/30/1932), who is being seen today for a visit.  HPI information obtained from: facility chart records, facility staff, patient report and [facility] Epic chart review. Today's concern is:     Chest pain, unspecified type  Congestive heart failure, unspecified HF chronicity, unspecified heart failure type (H)  Essential hypertension  Atrial fibrillation, unspecified type (H)  Chronic obstructive pulmonary disease, unspecified COPD type (H)  Failure to thrive in adult  Nausea   Tamra Clifton is an 87 year old female being seen today for follow-up after being seen in ED for chest pain. She had ~ 1 hour of chest pain in at [facility] were she lives, so presented to ED. No pain in ED. EKG showed no acute changes. CXR showed right lobe atelectasis, consistent with previous imaging and no other acute changes. She was hypoxic with O2 sats of 85% so she was given a duo neb, sats improved to 95% and she was discharged back to [facility].     Weekend was uneventful. She " "has been working with home therapies who have reported no progress, and are concerned about overall decline. They report that Jazmin told them she is interested in hospice cares. Home health nurse reports Jazmin continued to complain of nausea, had emesis yesterday AM, reports it was \"slimey\" phlegm.    Jazmin states she has not had any chest pain since yesterday. Describes chest pain as a tightness in her right lower chest which makes it hard to breath. She did two nebs yesterday and thinks those are what have helped the most with pain. She also reports she have been having more coughing spells that come on, do not usually produce anything, but do make her very SOB. She did have one of these episodes during visit, lasted about 1 minute, She is unable to verbalize how often these happen.     Discussed overall decline with patient- she is needing increased assistance with transfers. Continues to have poor appetite. States she did not really eat all yesterday because she did not feel hungry. She did eat some breakfast today. Denies any belly pain or nausea today. She has lost about 12-15lbs in the past 4 months. She feels more SOB with activity. Has not noted any wheezing. Staff continue to report increased confusion. She was forgetful during visit  - could not think of words or would loose train of thought in middle of sentence. Discussed that it was felt that she would might qualify for hospice. She was not sure that she was \"sick enough\" but was agreeable to meet with hospice nurse to discuss services they would be able to offer her. Her main concern was that if signed on to hospice, she would not be able to stay facility. She does not want to move into LTC, and wants to make sure she will be able to stay at [facility].     Spoke with son Gabino about patient decline and hospice via phone after video visit with patient. He verbalize that he did not realize she was \"that far.\" Wanted to discuss further with brother. Will " reach out to provider again later this week. Offered to have hospice speak with him if he so desired.       Past Medical and Surgical History reviewed in Epic today.  MEDICATIONS:    Current Outpatient Medications   Medication Sig Dispense Refill     ACETAMINOPHEN EXTRA STRENGTH 500 MG tablet TAKE TWO TABLETS (1000MG) BY MOUTH THREE TIMES DAILY 180 tablet 97     atorvastatin (LIPITOR) 40 MG tablet TAKE 1 TABLET BY MOUTH ONCE DAILY 28 tablet 97     benzocaine (ORAJEL/ANBESOL) 10 % gel Take by mouth 4 times daily as needed for moderate pain       bisacodyl (DULCOLAX) 10 MG suppository Place 10 mg rectally daily as needed for constipation       Blood Glucose Monitoring Suppl CORY 2 times daily Call nurse for further directions if blood glucose is less than 80 or greater than 250       BREO ELLIPTA 100-25 MCG/INH inhaler INHALE 1 PUFF BY MOUTH ONCE DAILY (Patient taking differently: Inhale 1 puff into the lungs daily ) 1 Inhaler 97     calcium carbonate (TUMS) 500 MG chewable tablet Take 1 chew tab by mouth 3 times daily as needed       DULoxetine (CYMBALTA) 30 MG capsule TAKE 1 CAPSULE BY MOUTH AT BEDTIME 28 capsule 97     DULoxetine (CYMBALTA) 60 MG capsule TAKE 1 CAPSULE BY MOUTH EVERY MORNING 28 capsule 97     Emollient (MOISTURIZING LOTION EX) Externally apply topically 2 times daily Bilateral lower extremeties       FEROSUL 325 (65 Fe) MG tablet TAKE 1 TABLET BY MOUTH ONCE DAILY 28 tablet 97     furosemide (LASIX) 20 MG tablet TAKE 1 TABLET BY MOUTH ONCE DAILY 28 tablet 97     gabapentin (NEURONTIN) 100 MG capsule Take 100 mg by mouth daily       HYDROmorphone (DILAUDID) 2 MG tablet TAKE 1 TABLET BY MOUTH TWICE DAILY AS NEEDED FOR SEVERE PAIN 12 tablet 0     hypromellose-dextran (GENTEAL TEARS) 0.1-0.3 % ophthalmic solution Place 1 drop into both eyes every 6 hours as needed for dry eyes       levalbuterol (XOPENEX) 1.25 MG/3ML neb solution Take 1 ampule by nebulization every 4 hours as needed for shortness of  breath / dyspnea or wheezing And every 4 hours PRN       Lidocaine (LIDOCARE) 4 % Patch Place 1 patch onto the skin daily To desired area (shoulder or hip). On for 12hrs, off for 12hrs 30 patch 11     lisinopril (ZESTRIL) 20 MG tablet TAKE 1 TABLET BY MOUTH ONCE DAILY 28 tablet 97     Menthol, Topical Analgesic, (BIOFREEZE) 4 % GEL Externally apply topically 4 times daily as needed To left shoulder and right hip       NIFEdipine ER (ADALAT CC) 60 MG 24 hr tablet Take 60 mg by mouth daily        NIFEdipine ER OSMOTIC (PROCARDIA XL) 60 MG 24 hr tablet TAKE 1 TABLET BY MOUTH ONCE DAILY 28 tablet 97     nystatin (MYCOSTATIN) 808526 UNIT/GM external powder Apply topically 3 times daily 1 Bottle 97     omeprazole (PRILOSEC) 40 MG DR capsule TAKE 1 CAPSULE BY MOUTH ONCE DAILY 28 capsule 97     ondansetron (ZOFRAN) 4 MG tablet Take 4 mg by mouth every 6 hours as needed for nausea       oxyCODONE (OXYCONTIN) 10 MG 12 hr tablet TAKE ONE TABLET BY MOUTH EVERY 12 HOURS - DO NOT CRUSH 60 tablet 0     predniSONE (DELTASONE) 5 MG tablet TAKE 1 TABLET BY MOUTH ONCE DAILY 31 tablet PRN     SENEXON-S 8.6-50 MG tablet TAKE TWO TABLETS BY MOUTH TWICE DAILY 112 tablet 97     tiZANidine (ZANAFLEX) 2 MG tablet Take 2 mg by mouth every 8 hours as needed        VITAMIN D3 25 MCG (1000 UT) tablet TAKE 1 TABLET BY MOUTH ONCE DAILY 28 tablet 97     warfarin ANTICOAGULANT (COUMADIN) 1 MG tablet Take 2 tabs (2 mg) every MWF, and take 3 tabs (3 mg) all other days of the week or as directed by the Anticoagulation Clinic 90 tablet 1     REVIEW OF SYSTEMS: 4 point ROS including Respiratory, CV, GI and , other than that noted in the HPI,  is negative  Objective: BP (!) 179/93   Pulse 73   Temp 98  F (36.7  C)   Resp 18   Wt 82.1 kg (181 lb)   BMI 32.06 kg/m    Limited visit exam done given COVID-19 precautions.   GENERAL APPEARANCE:  Alert, cooperative  RESP:  cough occassional, harsh, non-productive, no respiratory distress at rest, LACEY   CV:   peripheral edema 1-2+ in BLE (exam limited due to video visit)  M/S:   non-ambulatory, decrease ROM to bilat shoudlers  SKIN:  Inspection of skin and subcutaneous tissue baseline, no obvious wounds or lesions noted  NEURO:   speech clear, no facial droop, PETERSEN,   PSYCH:  oriented X 3, memory impaired , affect and mood normal, axious at times  Labs:   Recent labs in [facility] reviewed by me today.  and   Most Recent 3 CBC's:  Recent Labs   Lab Test 07/15/20  2044 06/29/20  1115 06/11/20  0720   WBC 7.9 8.0 7.9   HGB 11.9 11.8 10.1*   MCV 92 94 92    252 217     Most Recent 3 BMP's:  Recent Labs   Lab Test 07/15/20  2044 06/29/20  1115 06/11/20  0720    143 144   POTASSIUM 3.8 3.8 4.1   CHLORIDE 105 106 109   CO2 29 30 30   BUN 24 39* 28   CR 1.18* 1.22* 1.13*   ANIONGAP 5 7 5   BLAIR 9.6 9.8 9.5   * 130* 95     Most Recent 3 INR's:  Recent Labs   Lab Test 07/21/20 07/15/20  2044 07/14/20   INR 2.7* 2.66* 2.6*       ASSESSMENT/PLAN:  (R07.9) Chest pain, unspecified type  (primary encounter diagnosis)  Comment: Unclear etiology- ED work-up negative for ACS, Low suspicions for PE as not tachycardic and INR therapeutic. Does not appear infectious. Does have atelectasis to RLL, - so ? Contributing. Suspect that anxiety and cough are also contributing. Prolonged discussion with patient, home care, and nursing staff.   Plan: Continue to monitor, continue PRN nebs, COPD management as below.     (I50.9) Congestive heart failure, unspecified HF chronicity, unspecified heart failure type (H)  (I10) Essential hypertension  Comment: Appear compensated. Suspect SOB more due to COPD.   Plan: Continue daily weights, lisinopril, furosemide 20mg daily. Compression wraps to BLE. Continue to monitor.     (I48.91) Atrial fibrillation, unspecified type (H)  Comment: HR controlled, INR therapeutic. Is high falls risk, but she wishes to continue coumadin for now.   Plan: Continue coumadin per [facility].     (J44.9) Chronic  obstructive pulmonary disease, unspecified COPD type (H)  Comment: No s/s of acute exacerbation, but suspect she is having worsening symptoms given increased cough, SOB, intermittent hypoxia and atelectasis on imaging. Is on chronic for steroids but likely helping improve breathing.  Deconditioning and limited activity probably also contributing to SOB, but has declined rather than made improvement with PT and OT recently. Discussed with patient, son, PT, and nursing staff.   Plan: Continue Breo Ellipta inhaler daily PRN levalbuterol nebs, will add Mucinex BID. Patient to meet with hospice.     Depression with anxiety  Comment: Suspect contributing to SOB, recent episodes of chest pain.  Plan: Will decrease duloxetine to 60mg daily as likely no benefit to higher dose.      (R11.0) Nausea  (R63.4) Weight loss  Comment: Poor appetite. Has lost 12-15lbs in past 4 months. Intermittent nausea - ? If medications contributing given significant polypharmacy. Suspect cough is also contributing. Denies any diarrhea currently. SLP is following. Discussed weight loss with patient and nursing staff.   Plan: Continue PRN zofran, omeprazole. Patient to meet with hospice to discuss options.     Orders written by provider at facility  1. discontinue PM dose of duloxetine - continue duloxetine 60mg PO q day.   2. Mucinex 600mg PO q12h DX: cough.   Electronically signed by:  MARY Gómez CNP     Total time with patient visit: 60 minutes including discussions about the POC and care coordination with the patient and family, dale Lloyd. Greater than 50% of total time spent with counseling and coordinating care due to discussion with nursing and IDT about hospice recent mental and physical decline, chart review. Long discussion with patient about current symptoms, multiple chronic medical conditions including CHF, chest pain, progression of COPD, weight loss, cough, nausea, afib with coumadin use.      Video-Visit  Details  Type of service:  Video Visit  Video End Time (time video stopped): 1118. Phone call with dale Lloyd from [phone].   Distant Location (provider location):  [facility]

## 2020-07-21 NOTE — PROGRESS NOTES
ANTICOAGULATION MANAGEMENT     Patient Name:  Jazmin Clifton  Date:  2020    ASSESSMENT /SUBJECTIVE:    Today's INR result of 2.7 is therapeutic. Goal INR of 2.0-3.0      Warfarin dose taken: Warfarin taken as previously instructed    Diet: No new diet changes affecting INR    Medication changes/ interactions: No new medications/supplements affecting INR    Previous INR: Therapeutic     S/S of bleeding or thromboembolism: No    New injury or illness: No    Upcoming surgery, procedure or cardioversion: No    Additional findings: None      PLAN:    Spoke with [facility] home care, regarding INR result and instructed:     Warfarin Dosing Instructions: Continue your current warfarin dose 2 mg on monday/wed/fri and 3 mg all other days    Instructed patient to follow up no later than: 2 weeks  Orders given to  Homecare nurse/facility to recheck    Education provided: None required      bossman Shrestha, verbalizes understanding and agrees to warfarin dosing plan.    Instructed to call the Anticoagulation Clinic for any changes, questions or concerns. (#[phone])        Michelle Alvarez RN      OBJECTIVE:  Recent labs: (last 7 days)     07/15/20  2044 07/21/20   INR 2.66* 2.7*         No question data found.  Anticoagulation Summary  As of 2020    INR goal:   2.0-3.0   TTR:   80.5 % (3.9 mo)   INR used for dosin.7 (2020)   Warfarin maintenance plan:   2 mg (1 mg x 2) every Mon, Wed, Fri; 3 mg (1 mg x 3) all other days   Full warfarin instructions:   2 mg every Mon, Wed, Fri; 3 mg all other days   Weekly warfarin total:   18 mg   Plan last modified:   Michelle Alvarez RN (3/31/2020)   Next INR check:      Priority:   High   Target end date:   3/11/2021    Indications    Paroxysmal atrial fibrillation (H) [I48.0]  Atrial fibrillation  unspecified type (H) [I48.91]  History of DVT (deep vein thrombosis) [Z86.718]  CVA (cerebral vascular accident) (H) (Resolved) [I63.9]             Anticoagulation  Episode Summary     INR check location:       Preferred lab:       Send INR reminders to:   GUSTAVO FARAH    Comments:   History of CVA 2017 s/p endarterectomy in 2017 Previous clipping of aneurysm. [facility]. Lives at [facility]. Facility lab days are MON/THURS. Fax orders to [phone] Phone: [phone]      Anticoagulation Care Providers     Provider Role Specialty Phone number    Junie Estrella APRN CNP Responsible Nurse Practitioner - Gerontology [phone]

## 2020-07-22 NOTE — LETTER
"    7/22/2020        RE: Tamra Clifton  [address]  [facility] [address]        [facility]   Tamra Clifton is being evaluated via a billable video visit due to the restrictions of the Covid-19 pandemic.   The patient has been notified of following:  \"This video visit will be conducted via a call between you and your provider. We have found that certain health care needs can be provided without the need for an in-person physical exam.  This service lets us provide the care you need with a video conversation. If during the course of the call the provider feels a video visit is not appropriate, you will not be charged for this service.\"   The provider has received verbal consent for a Video Visit from the patient or first contact? Yes  Patient  or facility staff would like the video invitation sent by: N/A   Video Start Time: 1038    [place] [facility] Medical Record Number:  [id number]  Place of Location at the time of visit: [facility]  Chief Complaint   Patient presents with     RECHECK     HPI:  Tamra Clifton  is a 87 year old (12/30/1932), who is being seen today for a visit.  HPI information obtained from: facility chart records, facility staff, patient report and [facility] Epic chart review. Today's concern is:     Chest pain, unspecified type  Congestive heart failure, unspecified HF chronicity, unspecified heart failure type (H)  Essential hypertension  Atrial fibrillation, unspecified type (H)  Chronic obstructive pulmonary disease, unspecified COPD type (H)  Failure to thrive in adult  Nausea   Tamra Clifton is an 87 year old female being seen today for follow-up after being seen in ED for chest pain. She had ~ 1 hour of chest pain in at [facility] were she lives, so presented to ED. No pain in ED. EKG showed no acute changes. CXR showed right lobe atelectasis, consistent with previous imaging and no other acute changes. She was hypoxic with O2 sats of 85% so she was given a " "duo neb, sats improved to 95% and she was discharged back to [facility].     Weekend was uneventful. She has been working with home therapies who have reported no progress, and are concerned about overall decline. They report that Jazmin told them she is interested in hospice cares. Home health nurse reports Jazmin continued to complain of nausea, had emesis yesterday AM, reports it was \"slimey\" phlegm.    Jazmin states she has not had any chest pain since yesterday. Describes chest pain as a tightness in her right lower chest which makes it hard to breath. She did two nebs yesterday and thinks those are what have helped the most with pain. She also reports she have been having more coughing spells that come on, do not usually produce anything, but do make her very SOB. She did have one of these episodes during visit, lasted about 1 minute, She is unable to verbalize how often these happen.     Discussed overall decline with patient- she is needing increased assistance with transfers. Continues to have poor appetite. States she did not really eat all yesterday because she did not feel hungry. She did eat some breakfast today. Denies any belly pain or nausea today. She has lost about 12-15lbs in the past 4 months. She feels more SOB with activity. Has not noted any wheezing. Staff continue to report increased confusion. She was forgetful during visit  - could not think of words or would loose train of thought in middle of sentence. Discussed that it was felt that she would might qualify for hospice. She was not sure that she was \"sick enough\" but was agreeable to meet with hospice nurse to discuss services they would be able to offer her. Her main concern was that if signed on to hospice, she would not be able to stay facility. She does not want to move into LTC, and wants to make sure she will be able to stay at [facility].     Spoke with son Gabino about patient decline and hospice via phone after video visit with patient. He " "verbalize that he did not realize she was \"that far.\" Wanted to discuss further with brother. Will reach out to provider again later this week. Offered to have hospice speak with him if he so desired.       Past Medical and Surgical History reviewed in Epic today.  MEDICATIONS:    Current Outpatient Medications   Medication Sig Dispense Refill     ACETAMINOPHEN EXTRA STRENGTH 500 MG tablet TAKE TWO TABLETS (1000MG) BY MOUTH THREE TIMES DAILY 180 tablet 97     atorvastatin (LIPITOR) 40 MG tablet TAKE 1 TABLET BY MOUTH ONCE DAILY 28 tablet 97     benzocaine (ORAJEL/ANBESOL) 10 % gel Take by mouth 4 times daily as needed for moderate pain       bisacodyl (DULCOLAX) 10 MG suppository Place 10 mg rectally daily as needed for constipation       Blood Glucose Monitoring Suppl CORY 2 times daily Call nurse for further directions if blood glucose is less than 80 or greater than 250       BREO ELLIPTA 100-25 MCG/INH inhaler INHALE 1 PUFF BY MOUTH ONCE DAILY (Patient taking differently: Inhale 1 puff into the lungs daily ) 1 Inhaler 97     calcium carbonate (TUMS) 500 MG chewable tablet Take 1 chew tab by mouth 3 times daily as needed       DULoxetine (CYMBALTA) 30 MG capsule TAKE 1 CAPSULE BY MOUTH AT BEDTIME 28 capsule 97     DULoxetine (CYMBALTA) 60 MG capsule TAKE 1 CAPSULE BY MOUTH EVERY MORNING 28 capsule 97     Emollient (MOISTURIZING LOTION EX) Externally apply topically 2 times daily Bilateral lower extremeties       FEROSUL 325 (65 Fe) MG tablet TAKE 1 TABLET BY MOUTH ONCE DAILY 28 tablet 97     furosemide (LASIX) 20 MG tablet TAKE 1 TABLET BY MOUTH ONCE DAILY 28 tablet 97     gabapentin (NEURONTIN) 100 MG capsule Take 100 mg by mouth daily       HYDROmorphone (DILAUDID) 2 MG tablet TAKE 1 TABLET BY MOUTH TWICE DAILY AS NEEDED FOR SEVERE PAIN 12 tablet 0     hypromellose-dextran (GENTEAL TEARS) 0.1-0.3 % ophthalmic solution Place 1 drop into both eyes every 6 hours as needed for dry eyes       levalbuterol (XOPENEX) " 1.25 MG/3ML neb solution Take 1 ampule by nebulization every 4 hours as needed for shortness of breath / dyspnea or wheezing And every 4 hours PRN       Lidocaine (LIDOCARE) 4 % Patch Place 1 patch onto the skin daily To desired area (shoulder or hip). On for 12hrs, off for 12hrs 30 patch 11     lisinopril (ZESTRIL) 20 MG tablet TAKE 1 TABLET BY MOUTH ONCE DAILY 28 tablet 97     Menthol, Topical Analgesic, (BIOFREEZE) 4 % GEL Externally apply topically 4 times daily as needed To left shoulder and right hip       NIFEdipine ER (ADALAT CC) 60 MG 24 hr tablet Take 60 mg by mouth daily        NIFEdipine ER OSMOTIC (PROCARDIA XL) 60 MG 24 hr tablet TAKE 1 TABLET BY MOUTH ONCE DAILY 28 tablet 97     nystatin (MYCOSTATIN) 888930 UNIT/GM external powder Apply topically 3 times daily 1 Bottle 97     omeprazole (PRILOSEC) 40 MG DR capsule TAKE 1 CAPSULE BY MOUTH ONCE DAILY 28 capsule 97     ondansetron (ZOFRAN) 4 MG tablet Take 4 mg by mouth every 6 hours as needed for nausea       oxyCODONE (OXYCONTIN) 10 MG 12 hr tablet TAKE ONE TABLET BY MOUTH EVERY 12 HOURS - DO NOT CRUSH 60 tablet 0     predniSONE (DELTASONE) 5 MG tablet TAKE 1 TABLET BY MOUTH ONCE DAILY 31 tablet PRN     SENEXON-S 8.6-50 MG tablet TAKE TWO TABLETS BY MOUTH TWICE DAILY 112 tablet 97     tiZANidine (ZANAFLEX) 2 MG tablet Take 2 mg by mouth every 8 hours as needed        VITAMIN D3 25 MCG (1000 UT) tablet TAKE 1 TABLET BY MOUTH ONCE DAILY 28 tablet 97     warfarin ANTICOAGULANT (COUMADIN) 1 MG tablet Take 2 tabs (2 mg) every MWF, and take 3 tabs (3 mg) all other days of the week or as directed by the Anticoagulation Clinic 90 tablet 1     REVIEW OF SYSTEMS: 4 point ROS including Respiratory, CV, GI and , other than that noted in the HPI,  is negative  Objective: BP (!) 179/93   Pulse 73   Temp 98  F (36.7  C)   Resp 18   Wt 82.1 kg (181 lb)   BMI 32.06 kg/m    Limited visit exam done given COVID-19 precautions.   GENERAL APPEARANCE:  Alert,  cooperative  RESP:  cough occassional, harsh, non-productive, no respiratory distress at rest, LACEY   CV:  peripheral edema 1-2+ in BLE (exam limited due to video visit)  M/S:   non-ambulatory, decrease ROM to bilat shoudlers  SKIN:  Inspection of skin and subcutaneous tissue baseline, no obvious wounds or lesions noted  NEURO:   speech clear, no facial droop, PETERSEN,   PSYCH:  oriented X 3, memory impaired , affect and mood normal, axious at times  Labs:   Recent labs in [facility] reviewed by me today.  and   Most Recent 3 CBC's:  Recent Labs   Lab Test 07/15/20  2044 06/29/20  1115 06/11/20  0720   WBC 7.9 8.0 7.9   HGB 11.9 11.8 10.1*   MCV 92 94 92    252 217     Most Recent 3 BMP's:  Recent Labs   Lab Test 07/15/20  2044 06/29/20  1115 06/11/20  0720    143 144   POTASSIUM 3.8 3.8 4.1   CHLORIDE 105 106 109   CO2 29 30 30   BUN 24 39* 28   CR 1.18* 1.22* 1.13*   ANIONGAP 5 7 5   BLAIR 9.6 9.8 9.5   * 130* 95     Most Recent 3 INR's:  Recent Labs   Lab Test 07/21/20 07/15/20  2044 07/14/20   INR 2.7* 2.66* 2.6*       ASSESSMENT/PLAN:  (R07.9) Chest pain, unspecified type  (primary encounter diagnosis)  Comment: Unclear etiology- ED work-up negative for ACS, Low suspicions for PE as not tachycardic and INR therapeutic. Does not appear infectious. Does have atelectasis to RLL, - so ? Contributing. Suspect that anxiety and cough are also contributing. Prolonged discussion with patient, home care, and nursing staff.   Plan: Continue to monitor, continue PRN nebs, COPD management as below.     (I50.9) Congestive heart failure, unspecified HF chronicity, unspecified heart failure type (H)  (I10) Essential hypertension  Comment: Appear compensated. Suspect SOB more due to COPD.   Plan: Continue daily weights, lisinopril, furosemide 20mg daily. Compression wraps to BLE. Continue to monitor.     (I48.91) Atrial fibrillation, unspecified type (H)  Comment: HR controlled, INR therapeutic. Is high falls risk, but  she wishes to continue coumadin for now.   Plan: Continue coumadin per [facility].     (J44.9) Chronic obstructive pulmonary disease, unspecified COPD type (H)  Comment: No s/s of acute exacerbation, but suspect she is having worsening symptoms given increased cough, SOB, intermittent hypoxia and atelectasis on imaging. Is on chronic for steroids but likely helping improve breathing.  Deconditioning and limited activity probably also contributing to SOB, but has declined rather than made improvement with PT and OT recently. Discussed with patient, son, PT, and nursing staff.   Plan: Continue Breo Ellipta inhaler daily PRN levalbuterol nebs, will add Mucinex BID. Patient to meet with hospice.     Depression with anxiety  Comment: Suspect contributing to SOB, recent episodes of chest pain.  Plan: Will decrease duloxetine to 60mg daily as likely no benefit to higher dose.      (R11.0) Nausea  (R63.4) Weight loss  Comment: Poor appetite. Has lost 12-15lbs in past 4 months. Intermittent nausea - ? If medications contributing given significant polypharmacy. Suspect cough is also contributing. Denies any diarrhea currently. SLP is following. Discussed weight loss with patient and nursing staff.   Plan: Continue PRN zofran, omeprazole. Patient to meet with hospice to discuss options.     Orders written by provider at facility  1. discontinue PM dose of duloxetine - continue duloxetine 60mg PO q day.   2. Mucinex 600mg PO q12h DX: cough.   Electronically signed by:  MARY Gómez CNP     Total time with patient visit: 60 minutes including discussions about the POC and care coordination with the patient and family, dale Lloyd. Greater than 50% of total time spent with counseling and coordinating care due to discussion with nursing and IDT about hospice recent mental and physical decline, chart review. Long discussion with patient about current symptoms, multiple chronic medical conditions including CHF, chest  pain, progression of COPD, weight loss, cough, nausea, afib with coumadin use.      Video-Visit Details  Type of service:  Video Visit  Video End Time (time video stopped): 1118. Phone call with dale Lloyd from [phone].   Distant Location (provider location):  [facility]             Sincerely,        MARY Gómez CNP

## 2020-08-05 NOTE — PROGRESS NOTES
ANTICOAGULATION MANAGEMENT     Patient Name:  Jazmin Clifton  Date:  8/5/2020    ASSESSMENT /SUBJECTIVE:    Today's INR result of 3.2 is supratherapeutic. Goal INR of 2.0-3.0      Warfarin dose taken: Warfarin taken as previously instructed    Diet: Decreased greens/vitamin K intake may be affecting INR    Medication changes/ interactions: No new medications/supplements affecting INR    Previous INR: Therapeutic     S/S of bleeding or thromboembolism: No    New injury or illness: No    Upcoming surgery, procedure or cardioversion: No    Additional findings: Nausea, vomiting      PLAN:    Spoke with bossman Shrestha, regarding INR result and instructed:     Warfarin Dosing Instructions: Continue your current warfarin dose 2 mg on monday/wed/fri and 3 mg all other days    Instructed patient to follow up no later than: 2 weeks  Orders given to  Homecare nurse/facility to recheck    Education provided: Monitoring for bleeding signs and symptoms      Henrietta redman verbalizes understanding and agrees to warfarin dosing plan.    Instructed to call the Anticoagulation Clinic for any changes, questions or concerns. (#[phone])        Michelle Alvarez RN      OBJECTIVE:  Recent labs: (last 7 days)     08/05/20   INR 3.2*         No question data found.  Anticoagulation Summary  As of 8/5/2020    INR goal:   2.0-3.0   TTR:   78.2 % (4.4 mo)   INR used for dosing:   3.2! (8/5/2020)   Warfarin maintenance plan:   2 mg (1 mg x 2) every Mon, Wed, Fri; 3 mg (1 mg x 3) all other days   Full warfarin instructions:   2 mg every Mon, Wed, Fri; 3 mg all other days   Weekly warfarin total:   18 mg   Plan last modified:   Michelle Alvarez RN (3/31/2020)   Next INR check:   8/19/2020   Priority:   Maintenance   Target end date:   3/11/2021    Indications    Paroxysmal atrial fibrillation (H) [I48.0]  Atrial fibrillation  unspecified type (H) [I48.91]  History of DVT (deep vein thrombosis) [Z86.718]  CVA (cerebral vascular  accident) (H) (Resolved) [I63.9]             Anticoagulation Episode Summary     INR check location:       Preferred lab:       Send INR reminders to:   GUSTAVO FARAH    Comments:   History of CVA 2017 s/p endarterectomy in 2017 Previous clipping of aneurysm. [facility]. Lives at [facility]. Facility lab days are MON/THURS. Fax orders to [phone] Phone: [phone]      Anticoagulation Care Providers     Provider Role Specialty Phone number    Junie Estrella APRN CNP Responsible Nurse Practitioner - Gerontology [phone]

## 2020-08-07 NOTE — PROGRESS NOTES
[facility] Care Coordination Contact      [facility] Six-Month Telephone Assessment    6 month telephone assessment completed.    ER visits: Yes -  [facility]  Hospitalizations: Yes -  [facility]  TCU stays: Yes -  [facility]  Significant health status changes: gradual decline   Falls/Injuries: Yes: no injuries   ADL/IADL changes: No - increase in time for assistance but no changed in condition   Changes in services: No - Hospice is being disussed    Caregiver Assessment follow up:  None     Goals: See POC in chart for goal progress documentation.  Updated     Will see member in 6 months for an annual health risk assessment.   Encouraged member to call CC with any questions or concerns in the meantime.     Mirian PECK   [facility] Care Coordinator   [phone] - wdsu cell phone   [phone] - zenl fax

## 2020-08-12 NOTE — PROGRESS NOTES
[facility] Care Coordination Contact  Per TATE.    Jazmin Clifton 12/30/1932 BED RAIL/ INSTALL 6/26/2020 DELIVERED

## 2020-08-13 NOTE — PROGRESS NOTES
[facility] Care Coordination Contact    Auth submitted to health plan, portal auth sent to provider and [employer] updated. Change in care plan member letter mailed to member to sign and return.    Analisa Dorsey  Care Management Specialist   [facility]   [phone]

## 2020-08-13 NOTE — PROGRESS NOTES
[facility] Care Coordination Contact    8-13-20  Writing in regards to HA authorization for patient Jazmin Clifton [birth year]. She is under the EW and the last auth received ended 6/30/20. Is there auth in place effective 7/1/20 if not can one be submitted please?   Thank you,  Tejas Gomez MA, CPG   Supervisor Insurance Team  [facility]   [address] [place] [address]   Phone   Fax    [email or web address]    [email or web address]  This CC contacted SEVERO Jiang at [facility] reviewed member POC.  She stated due to member high level of care and complex health status, keeping the HHA would benefit her overall POC.  Dx COPD, DDD and cognitive impairment.  Determined to continue HHA 2x weekly until annual assessment due in February.  POC updated, auth submitted by CMS.  Marli Miller RN PHN  [facility]   [phone]

## 2020-08-17 NOTE — TELEPHONE ENCOUNTER
ANTICOAGULATION MANAGEMENT     Patient Name:  Jazmin Clifton  Date:  2020    ASSESSMENT /SUBJECTIVE:    Today's INR result of 5.06 is supratherapeutic. Goal INR of 2.0-3.0      Warfarin dose taken: Warfarin taken as previously instructed    Diet: No new diet changes affecting INR    Medication changes/ interactions: No new medications/supplements affecting INR    Previous INR: Supratherapeutic     S/S of bleeding or thromboembolism: Yes: nurse state pt is reporting dark, tarry stools, but doesn't think that is anything new for the pt.  Advised to monitor    New injury or illness: No    Upcoming surgery, procedure or cardioversion: No    Additional findings: Home care nurse did a fingerstick INR this AM with a result of 5.8, but only reported to facility nurse, but yovani a venous INR      PLAN:    Spoke with nurse Sola at [facility] regarding INR result and instructed:     Warfarin Dosing Instructions: Hold warfarin today and tomorrow then change your warfarin dose to 3 mg every Sun; 2 mg all other days    Instructed patient to follow up no later than: 1 week  Orders given to  Homecare nurse/facility to recheck    Education provided: Target INR goal and significance of current INR result, Monitoring for bleeding signs and symptoms and When to seek medical attention/emergency care      Sola verbalizes understanding and agrees to warfarin dosing plan.    Instructed to call the Anticoagulation Clinic for any changes, questions or concerns. (#[phone])        Radha Smiley RN      OBJECTIVE:  Recent labs: (last 7 days)     20  1450   INR 5.06*             Anticoagulation Summary  As of 2020    INR goal:   2.0-3.0   TTR:   71.4 % (4.8 mo)   INR used for dosin.06! (2020)   Warfarin maintenance plan:   3 mg (1 mg x 3) every Sun; 2 mg (1 mg x 2) all other days   Full warfarin instructions:   : Hold; : Hold; Otherwise 3 mg every Sun; 2 mg all other days   Weekly warfarin total:   15 mg    Plan last modified:   Radha Smiley, RN (8/17/2020)   Next INR check:   8/24/2020   Priority:   Maintenance   Target end date:   3/11/2021    Indications    Paroxysmal atrial fibrillation (H) [I48.0]  Atrial fibrillation  unspecified type (H) [I48.91]  History of DVT (deep vein thrombosis) [Z86.718]  CVA (cerebral vascular accident) (H) (Resolved) [I63.9]             Anticoagulation Episode Summary     INR check location:       Preferred lab:       Send INR reminders to:   GUSTAVO FARAH    Comments:   History of CVA 2017 s/p endarterectomy in 2017 Previous clipping of aneurysm. [facility]. Lives at [facility]. Facility lab days are MON/THURS. Fax orders to [phone] Phone: [phone]      Anticoagulation Care Providers     Provider Role Specialty Phone number    Junie Estrella APRN CNP Responsible Nurse Practitioner - Gerontology [phone]

## 2020-08-18 NOTE — TELEPHONE ENCOUNTER
Home care nurse called - instructions given to hold today's dose, give 2 mg tomorrow and recheck on Thursday, per INR protocol.     Felicita Mendez RN, BSN, PHN

## 2020-08-20 NOTE — PROGRESS NOTES
Deidre, nurse with [facility] calling. She states Home care was not able to see patient today due to staffing so INR will not be done until tomorrow.  She is requesting coumadin dosing for this evening. Patient should continue 2 mg coumadint his evening and INR to be checked tomorrow.    Dosing information faxed to facility.  Arely Marino, RN  Anticoagulation Nurse - [facility] [place]

## 2020-08-20 NOTE — PROGRESS NOTES
ANTICOAGULATION MANAGEMENT     Patient Name:  Jazmin Clifton  Date:  8/20/2020    ASSESSMENT /SUBJECTIVE:    Today's INR result of 4.2 is supratherapeutic. Goal INR of 2.0-3.0      Warfarin dose taken: Warfarin taken as previously instructed    Diet: No new diet changes affecting INR    Medication changes/ interactions: No new medications/supplements affecting INR    Previous INR: Supratherapeutic     S/S of bleeding or thromboembolism: No    New injury or illness: No    Upcoming surgery, procedure or cardioversion: No    Additional findings: None      PLAN:    Spoke with MANI Shrestha RN regarding INR result and instructed:     Warfarin Dosing Instructions: hold tonite then continue your current warfarin dose of 3 mg Sun, 2 mg ROW    Instructed patient to follow up no later than: 5 days Tuesday 8/25/20  Orders given to  Homecare nurse/facility to recheck    Education provided: None required      Henrietta verbalizes understanding and agrees to warfarin dosing plan. AVS faxed to the [facility].      Instructed to call the Anticoagulation Clinic for any changes, questions or concerns. (#[phone])        Billie Vaca RN      OBJECTIVE:  Recent labs: (last 7 days)     08/17/20  1450   INR 5.06*         INR assessment SUPRA    Recheck INR In: 5 DAYS    INR Location [facility] INR      Anticoagulation Summary  As of 8/20/2020    INR goal:   2.0-3.0   TTR:   71.4 % (4.8 mo)   INR used for dosing:      Warfarin maintenance plan:   3 mg (1 mg x 3) every Sun; 2 mg (1 mg x 2) all other days   Full warfarin instructions:   8/20: Hold; Otherwise 3 mg every Sun; 2 mg all other days   Weekly warfarin total:   15 mg   Plan last modified:   Radha Smiley, RN (8/17/2020)   Next INR check:   8/25/2020   Priority:   Maintenance   Target end date:   3/11/2021    Indications    Paroxysmal atrial fibrillation (H) [I48.0]  Atrial fibrillation  unspecified type (H) [I48.91]  History of DVT (deep vein thrombosis) [Z86.718]  CVA  (cerebral vascular accident) (H) (Resolved) [I63.9]             Anticoagulation Episode Summary     INR check location:       Preferred lab:       Send INR reminders to:   GUSTAVO FARAH    Comments:   History of CVA 2017 s/p endarterectomy in 2017 Previous clipping of aneurysm. [facility]. Lives at [facility]. Facility lab days are MON/THURS. Fax orders to [phone] Phone: [phone]      Anticoagulation Care Providers     Provider Role Specialty Phone number    Junie Estrella APRN CNP Responsible Nurse Practitioner - Gerontology [phone]

## 2020-08-25 NOTE — PROGRESS NOTES
ANTICOAGULATION MANAGEMENT     Patient Name:  Jazmin Clifton  Date:  8/25/2020    ASSESSMENT /SUBJECTIVE:    Today's INR result of 3.3 is supratherapeutic. Goal INR of 2.0-3.0      Warfarin dose taken: Warfarin taken as previously instructed    Diet: No new diet changes affecting INR    Medication changes/ interactions: No new medications/supplements affecting INR    Previous INR: Supratherapeutic     S/S of bleeding or thromboembolism: No    New injury or illness: No    Upcoming surgery, procedure or cardioversion: No    Additional findings: None      PLAN:    Spoke with Henrietta ELKINS regarding INR result and instructed:     Warfarin Dosing Instructions: Change your warfarin dose to 2mg daily    Instructed patient to follow up no later than: Mon 8/31  Orders given to  Homecare nurse/facility to recheck    Education provided: Target INR goal and significance of current INR result      Henrietta verbalizes understanding and agrees to warfarin dosing plan.    Instructed to call the Anticoagulation Clinic for any changes, questions or concerns. (#[phone])     Faxed to facility.      Rizwana Joseph RN per [facility] protocol for Junie Estrella       OBJECTIVE:  Recent labs: (last 7 days)     08/20/20 08/25/20   INR 4.2* 3.3*         No question data found.  Anticoagulation Summary  As of 8/25/2020    INR goal:   2.0-3.0   TTR:   67.9 % (5 mo)   INR used for dosing:   3.3! (8/25/2020)   Warfarin maintenance plan:   2 mg (1 mg x 2) every day   Full warfarin instructions:   2 mg every day   Weekly warfarin total:   14 mg   Plan last modified:   Rizwana Joseph RN (8/25/2020)   Next INR check:   8/31/2020   Priority:   Maintenance   Target end date:   3/11/2021    Indications    Paroxysmal atrial fibrillation (H) [I48.0]  Atrial fibrillation  unspecified type (H) [I48.91]  History of DVT (deep vein thrombosis) [Z86.718]  CVA (cerebral vascular accident) (H) (Resolved) [I63.9]             Anticoagulation Episode  Summary     INR check location:       Preferred lab:       Send INR reminders to:   GUSTAVO FARAH    Comments:   History of CVA 2017 s/p endarterectomy in 2017 Previous clipping of aneurysm. [facility]. Lives at [facility]. Facility lab days are MON/THURS. Fax orders to [phone] Phone: [phone]      Anticoagulation Care Providers     Provider Role Specialty Phone number    Junie Estrella APRN CNP Responsible Nurse Practitioner - Gerontology [phone]

## 2020-08-31 NOTE — PROGRESS NOTES
ANTICOAGULATION MANAGEMENT     Patient Name:  Jazmin Clifton  Date:  2020    ASSESSMENT /SUBJECTIVE:    Today's INR result of 2.4 is therapeutic. Goal INR of 2.0-3.0      Warfarin dose taken: Warfarin taken as previously instructed    Diet: No new diet changes affecting INR    Medication changes/ interactions: No interaction expected between compazine and warfarin. Prednisone dose increased.    Previous INR: Supratherapeutic     S/S of bleeding or thromboembolism: No    New injury or illness: No    Upcoming surgery, procedure or cardioversion: No    Additional findings: patient newly enrolled in hospice, today was the intake visit        PLAN:    Spoke with Eduar ELKINS regarding INR result and instructed:     Warfarin Dosing Instructions: Continue your current warfarin dose 2mg daily    Instructed patient to follow up no later than: 1 week  Orders given to  Homecare nurse/facility to recheck    Education provided: Target INR goal and significance of current INR result, Potential interaction between warfarin and prednisone dose change and No interaction anticipated between warfarin and compazine      Eduar verbalizes understanding and agrees to warfarin dosing plan.    Instructed to call the Anticoagulation Clinic for any changes, questions or concerns. (#[phone])        Rizwana Joseph RN      OBJECTIVE:  Recent labs: (last 7 days)     20   INR 3.3* 2.4*         No question data found.  Anticoagulation Summary  As of 2020    INR goal:   2.0-3.0   TTR:   67.9 % (5.2 mo)   INR used for dosin.4 (2020)   Warfarin maintenance plan:   2 mg (1 mg x 2) every day   Full warfarin instructions:   2 mg every day   Weekly warfarin total:   14 mg   No change documented:   Rizwana Joseph RN   Plan last modified:   Rizwana Joseph RN (2020)   Next INR check:   2020   Priority:   Maintenance   Target end date:   3/11/2021    Indications    Paroxysmal atrial fibrillation (H)  [I48.0]  Atrial fibrillation  unspecified type (H) [I48.91]  History of DVT (deep vein thrombosis) [Z86.718]  CVA (cerebral vascular accident) (H) (Resolved) [I63.9]             Anticoagulation Episode Summary     INR check location:       Preferred lab:       Send INR reminders to:   GUSTAVO FARAH    Comments:   History of CVA 2017 s/p endarterectomy in 2017 Previous clipping of aneurysm. [facility]. Lives at [facility]. Facility lab days are MON/THURS. Fax orders to [phone] Phone: [phone]      Anticoagulation Care Providers     Provider Role Specialty Phone number    Junie Estrella APRN CNP Responsible Nurse Practitioner - Gerontology [phone]

## 2020-09-08 NOTE — PROGRESS NOTES
ANTICOAGULATION MANAGEMENT     Patient Name:  Jazmin Clifton  Date:  2020    ASSESSMENT /SUBJECTIVE:    Today's INR result of 2.5 is therapeutic. Goal INR of 2.0-3.0      Warfarin dose taken: Warfarin taken as previously instructed    Diet: No new diet changes affecting INR    Medication changes/ interactions: No new medications/supplements affecting INR    Previous INR: Therapeutic     S/S of bleeding or thromboembolism: No    New injury or illness: No    Upcoming surgery, procedure or cardioversion: No    Additional findings: None      PLAN:    Spoke with Eduar ELKINS regarding INR result and instructed:     Warfarin Dosing Instructions: Continue your current warfarin dose 2mg daily    Instructed patient to follow up no later than: 1 week  Orders given to  Homecare nurse/facility to recheck    Education provided: Target INR goal and significance of current INR result      Eduar verbalizes understanding and agrees to warfarin dosing plan.    Instructed to call the Anticoagulation Clinic for any changes, questions or concerns. (#[phone])        Rizwana Joseph RN      OBJECTIVE:  Recent labs: (last 7 days)     20   INR 2.5*         No question data found.  Anticoagulation Summary  As of 2020    INR goal:   2.0-3.0   TTR:   69.4 % (5.5 mo)   INR used for dosin.5 (2020)   Warfarin maintenance plan:   2 mg (1 mg x 2) every day   Full warfarin instructions:   2 mg every day   Weekly warfarin total:   14 mg   Plan last modified:   Rizwana Joseph RN (2020)   Next INR check:   9/15/2020   Priority:   Maintenance   Target end date:   3/11/2021    Indications    Paroxysmal atrial fibrillation (H) [I48.0]  Atrial fibrillation  unspecified type (H) [I48.91]  History of DVT (deep vein thrombosis) [Z86.718]  CVA (cerebral vascular accident) (H) (Resolved) [I63.9]             Anticoagulation Episode Summary     INR check location:       Preferred lab:       Send INR reminders to:   GUSTAVO  SOFI    Comments:   History of CVA 2017 s/p endarterectomy in 2017 Previous clipping of aneurysm. [facility]. Lives at [facility]. Facility lab days are MON/THURS. Fax orders to [phone] Phone: [phone]      Anticoagulation Care Providers     Provider Role Specialty Phone number    Junie Estrella APRN CNP Responsible Nurse Practitioner - Gerontology [phone]

## 2020-09-15 NOTE — PROGRESS NOTES
[facility] Care Coordination Contact    2nd Attempt: Signed Letter not received from member, resent per process.     Sisi Islas  Care Management Specialist  [facility]  [phone]

## 2020-09-15 NOTE — PROGRESS NOTES
ANTICOAGULATION MANAGEMENT     Patient Name:  Jazmin Clifton  Date:  9/15/2020    ASSESSMENT /SUBJECTIVE:    Today's INR result of 2.7 is therapeutic. Goal INR of 2.0-3.0      Warfarin dose taken: Warfarin taken as previously instructed    Diet: No new diet changes affecting INR    Medication changes/ interactions: No new medications/supplements affecting INR    Previous INR: Therapeutic     S/S of bleeding or thromboembolism: No    New injury or illness: Yes: vomiting the last day    Upcoming surgery, procedure or cardioversion: No    Additional findings: None      PLAN:    Spoke with [facility], regarding INR result and instructed:     Warfarin Dosing Instructions: Continue your current warfarin dose 2 mg daily    Instructed patient to follow up no later than: 1 week  Orders given to  Homecare nurse/facility to recheck    Education provided: None required      bossman Alfaro care, verbalizes understanding and agrees to warfarin dosing plan.    Instructed to call the Anticoagulation Clinic for any changes, questions or concerns. (#[phone])        Michelle Alvarez RN      OBJECTIVE:  Recent labs: (last 7 days)     09/15/20   INR 2.7*         No question data found.  Anticoagulation Summary  As of 9/15/2020    INR goal:   2.0-3.0   TTR:   70.7 % (5.7 mo)   INR used for dosin.7 (9/15/2020)   Warfarin maintenance plan:   2 mg (1 mg x 2) every day   Full warfarin instructions:   2 mg every day   Weekly warfarin total:   14 mg   Plan last modified:   Rizwana Joseph RN (2020)   Next INR check:   2020   Priority:   Maintenance   Target end date:   3/11/2021    Indications    Paroxysmal atrial fibrillation (H) [I48.0]  Atrial fibrillation  unspecified type (H) [I48.91]  History of DVT (deep vein thrombosis) [Z86.718]  CVA (cerebral vascular accident) (H) (Resolved) [I63.9]             Anticoagulation Episode Summary     INR check location:       Preferred lab:       Send INR reminders to:    GUSTAVO FARAH    Comments:   History of CVA 2017 s/p endarterectomy in 2017 Previous clipping of aneurysm. [facility]. Lives at [facility]. Facility lab days are MON/THURS. Fax orders to [phone] Phone: [phone]      Anticoagulation Care Providers     Provider Role Specialty Phone number    Junie Estrella APRN CNP Responsible Nurse Practitioner - Gerontology [phone]

## 2020-09-16 NOTE — PROGRESS NOTES
[facility] Care Coordination Contact    9-15-20  CC receved call from member son Chi Clifton - [phone].  He called to review change of POC paperwork.  CC reviewed with him.  He shared member enrolled with Hospice.  CC confirmed she enrolled with  Hospice 8-27-20, her Hospice CM is: Dianne Dupontnataly [phone]    He also requested all mail be sent to him, as she has no interest in looking at her mail any longer.  CC requested CMS update all mail location to:  Chi Clifton  [address] [place] [address]  CC will send notification to Trego CoKya Sanchez, admin alerted of Hospice admit.  Marli Miller RN PHN  [facility]   [phone]

## 2020-09-22 NOTE — PROGRESS NOTES
ANTICOAGULATION MANAGEMENT     Patient Name:  Jazmin Clifton  Date:  2020    ASSESSMENT /SUBJECTIVE:    Today's INR result of 5.8 is supratherapeutic. Goal INR of 2.0-3.0      Warfarin dose taken: Warfarin taken as instructed    Diet: poor oral intake and some vomiting last week    Medication changes/ interactions: No interaction expected between compazine and warfarin    Previous INR: Therapeutic     S/S of bleeding or thromboembolism: No    New injury or illness: No    Upcoming surgery, procedure or cardioversion: No    Additional findings: None      PLAN:    Telephone call with home care nurse Vazquez regarding INR result and instructed:     Warfarin Dosing Instructions: hold x 2,  and     Instructed patient to follow up no later than:   Orders given to  Homecare nurse/facility to recheck    Education provided: Target INR goal and significance of current INR result, No interaction anticipated between warfarin and compazine and Monitoring for bleeding signs and symptoms      Vazquez verbalizes understanding and agrees to warfarin dosing plan.    Instructed to call the Anticoagulation Clinic for any changes, questions or concerns. (#[phone])        Rizwana Joseph RN      OBJECTIVE:  Recent labs: (last 7 days)     20   INR 5.8*         No question data found.  Anticoagulation Summary  As of 2020    INR goal:   2.0-3.0   TTR:   68.3 % (6 mo)   INR used for dosin.8! (2020)   Warfarin maintenance plan:   2 mg (1 mg x 2) every day   Full warfarin instructions:   : Hold; : Hold; Otherwise 2 mg every day   Weekly warfarin total:   14 mg   Plan last modified:   Rizwana Joseph RN (2020)   Next INR check:   2020   Priority:   Maintenance   Target end date:   3/11/2021    Indications    Paroxysmal atrial fibrillation (H) [I48.0]  Atrial fibrillation  unspecified type (H) [I48.91]  History of DVT (deep vein thrombosis) [Z86.718]  CVA (cerebral vascular  accident) (H) (Resolved) [I63.9]             Anticoagulation Episode Summary     INR check location:       Preferred lab:       Send INR reminders to:   GUSTAVO FARAH    Comments:   History of CVA 2017 s/p endarterectomy in 2017 Previous clipping of aneurysm. [facility]. Lives at [facility]. Facility lab days are MON/THURS. Fax orders to [phone] Phone: [phone]      Anticoagulation Care Providers     Provider Role Specialty Phone number    Jnuie Estrella APRN CNP Responsible Nurse Practitioner - Gerontology [phone]

## 2020-09-24 NOTE — PROGRESS NOTES
ANTICOAGULATION MANAGEMENT     Patient Name:  Jazmin Clifton  Date:  9/24/2020    ASSESSMENT /SUBJECTIVE:    Today's INR result of 3.2 is supratherapeutic. Goal INR of 2.0-3.0      Warfarin dose taken: Warfarin taken as instructed    Diet: No new diet changes affecting INR    Medication changes/ interactions: No new medications/supplements affecting INR    Previous INR: Supratherapeutic     S/S of bleeding or thromboembolism: No    New injury or illness: No    Upcoming surgery, procedure or cardioversion: No    Additional findings: Patient is now eating better      PLAN:    Telephone call with home care nurse [facility], regarding INR result and instructed:     Warfarin Dosing Instructions: Continue your current warfarin dose 2 mg daily    Instructed patient to follow up no later than: 09/28/20  Orders given to  Homecare nurse/facility to recheck    Education provided: Monitoring for bleeding signs and symptoms      [facility], verbalizes understanding and agrees to warfarin dosing plan.    Instructed to call the Anticoagulation Clinic for any changes, questions or concerns. (#[phone])        Michelle Alvarez RN      OBJECTIVE:  Recent labs: (last 7 days)     09/22/20 09/24/20   INR 5.8* 3.2*         No question data found.  Anticoagulation Summary  As of 9/24/2020    INR goal:   2.0-3.0   TTR:   67.5 % (6 mo)   INR used for dosing:   3.2! (9/24/2020)   Warfarin maintenance plan:   2 mg (1 mg x 2) every day   Full warfarin instructions:   2 mg every day   Weekly warfarin total:   14 mg   Plan last modified:   Rizwana Joseph RN (9/22/2020)   Next INR check:      Priority:   Maintenance   Target end date:   3/11/2021    Indications    Paroxysmal atrial fibrillation (H) [I48.0]  Atrial fibrillation  unspecified type (H) [I48.91]  History of DVT (deep vein thrombosis) [Z86.718]  CVA (cerebral vascular accident) (H) (Resolved) [I63.9]             Anticoagulation Episode Summary     INR check location:        Preferred lab:       Send INR reminders to:   GUSTAVO FARAH    Comments:   History of CVA 2017 s/p endarterectomy in 2017 Previous clipping of aneurysm. [facility]. Lives at [facility]. Facility lab days are MON/THURS. Fax orders to [phone] Phone: [phone]      Anticoagulation Care Providers     Provider Role Specialty Phone number    Junie Estrella APRN CNP Responsible Nurse Practitioner - Gerontology [phone]

## 2020-09-28 NOTE — PROGRESS NOTES
ANTICOAGULATION MANAGEMENT     Patient Name:  Jazmin Clifton  Date:  9/28/2020    ASSESSMENT /SUBJECTIVE:    Today's INR result of 3.0 is therapeutic. Goal INR of 2.0-3.0      Warfarin dose taken: Warfarin taken as instructed    Diet: No new diet changes affecting INR    Medication changes/ interactions: No new medications/supplements affecting INR    Previous INR: Supratherapeutic     S/S of bleeding or thromboembolism: No    New injury or illness: No    Upcoming surgery, procedure or cardioversion: No    Additional findings: None      PLAN:    Telephone call with home care nurse Vazquez regarding INR result and instructed:     Warfarin Dosing Instructions: Change your warfarin dose to 1 mg every Mon, Fri; 2 mg all other days    Instructed patient to follow up no later than: Fri 10/2  Orders given to  Homecare nurse/facility to recheck    Education provided: Target INR goal and significance of current INR result      Vazquez verbalizes understanding and agrees to warfarin dosing plan.    Instructed to call the Anticoagulation Clinic for any changes, questions or concerns. (#[phone])        Radha Smiley RN      OBJECTIVE:  Recent labs: (last 7 days)     09/22/20 09/24/20 09/28/20   INR 5.8* 3.2* 3.0*         No question data found.  Anticoagulation Summary  As of 9/28/2020    INR goal:   2.0-3.0   TTR:   66.1 % (6.2 mo)   INR used for dosing:   3.0 (9/28/2020)   Warfarin maintenance plan:   1 mg (1 mg x 1) every Mon, Fri; 2 mg (1 mg x 2) all other days   Full warfarin instructions:   1 mg every Mon, Fri; 2 mg all other days   Weekly warfarin total:   12 mg   Plan last modified:   Radha Smiley, RN (9/28/2020)   Next INR check:   10/2/2020   Priority:   Maintenance   Target end date:   3/11/2021    Indications    Paroxysmal atrial fibrillation (H) [I48.0]  Atrial fibrillation  unspecified type (H) [I48.91]  History of DVT (deep vein thrombosis) [Z86.718]  CVA (cerebral vascular accident) (H) (Resolved) [I63.9]              Anticoagulation Episode Summary     INR check location:       Preferred lab:       Send INR reminders to:   GUSTAVO FARAH    Comments:   History of CVA 2017 s/p endarterectomy in 2017 Previous clipping of aneurysm. [facility]. Lives at [facility]. Facility lab days are MON/THURS. Fax orders to [phone] Phone: [phone]      Anticoagulation Care Providers     Provider Role Specialty Phone number    Junie Estrella APRN CNP Responsible Nurse Practitioner - Gerontology [phone]

## 2020-10-02 NOTE — PROGRESS NOTES
ANTICOAGULATION MANAGEMENT     Patient Name:  Jazmin Clifton  Date:  10/2/2020    ASSESSMENT /SUBJECTIVE:    Today's INR result of 2.9 is therapeutic. Goal INR of 2.0-3.0      Warfarin dose taken: Warfarin taken as instructed    Diet: No new diet changes affecting INR    Medication changes/ interactions: No new medications/supplements affecting INR    Previous INR: Therapeutic     S/S of bleeding or thromboembolism: No    New injury or illness: No    Upcoming surgery, procedure or cardioversion: No    Additional findings: None      PLAN:    Telephone call with home care nurse Eduar regarding INR result and instructed:     Warfarin Dosing Instructions: Continue your current warfarin dose 1 mg MF; 2 mg ROW    Instructed patient to follow up no later than: 1 week  Orders given to  Homecare nurse/facility to recheck    Education provided: None required      Eduar verbalizes understanding and agrees to warfarin dosing plan.    Instructed to call the Anticoagulation Clinic for any changes, questions or concerns. (#[phone])        Billie Vaca RN      OBJECTIVE:  Recent labs: (last 7 days)     09/28/20 10/02/20   INR 3.0* 2.9*         INR assessment THER    Recheck INR In: 1 WEEK    INR Location [facility] INR      Anticoagulation Summary  As of 10/2/2020    INR goal:  2.0-3.0   TTR:  66.8 % (6.3 mo)   INR used for dosin.9 (10/2/2020)   Warfarin maintenance plan:  1 mg (1 mg x 1) every Mon, Fri; 2 mg (1 mg x 2) all other days   Full warfarin instructions:  1 mg every Mon, Fri; 2 mg all other days   Weekly warfarin total:  12 mg   Plan last modified:  Radha Smiley RN (2020)   Next INR check:  10/9/2020   Priority:  Maintenance   Target end date:  3/11/2021    Indications    Paroxysmal atrial fibrillation (H) [I48.0]  Atrial fibrillation  unspecified type (H) [I48.91]  History of DVT (deep vein thrombosis) [Z86.718]  CVA (cerebral vascular accident) (H) (Resolved) [I63.9]             Anticoagulation  Episode Summary     INR check location:      Preferred lab:      Send INR reminders to:  GUSTAVO FARAH    Comments:  History of CVA 2017 s/p endarterectomy in 2017 Previous clipping of aneurysm. [facility]. Lives at [facility]. Facility lab days are MON/THURS. Fax orders to [phone] Phone: [phone]      Anticoagulation Care Providers     Provider Role Specialty Phone number    Junie Estrella APRN CNP Responsible Nurse Practitioner - Gerontology [phone]

## 2020-10-07 NOTE — LETTER
10/7/2020        RE: Jazmin Clifton  [address] [facility] [address]        [place] [facility]  [place] Medical Record Number:  [id number]  Place of Service where encounter took place:  [facility] [place] [[id number]]  Chief Complaint   Patient presents with     RECHECK     Anual Wellness       HPI:    Jazmin Clifton  is a 87 year old (12/30/1932), who is being seen today for an episodic care visit.  HPI information obtained from: facility chart records, facility staff, patient report and [facility] chart review. Today's concern is:     Paroxysmal atrial fibrillation (H)  Recurrent falls  Hospice care patient  Pain syndrome, chronic  Dysphagia, unspecified type  Chronic obstructive pulmonary disease, unspecified COPD type (H)   Patient seen today for annual wellness visit. She is currently on hospice, staff report that they have noticed increased decline, she is weaker than usually, requiring more assistance with transfers. She has not been eating well. She is on  Mechanical soft diet, which she does not like, but admits to choking on a regular textured diet and is unable to eat it. Continues to have some intermittent nausea, often does not feel hungry, Staff report she declined breakfast this AM.  She feels tired all the time, continues to have neck and back pain, her pain medications and heat help. She has been getting head aches when she wakes up, but says she also has a very stiff neck when she wakes up.     She is on coumadin for stroke prevention given her afib. INR has been variable. Discussed risk of bleeding given falls and often elevated INR, felt that risk of bleeding likely greater than risk of CVA if coumadin is stopped. She verbalized understanding of this and was agreeable to stopping coumadin.     Past Medical and Surgical History reviewed in Epic today.    MEDICATIONS:    Current Outpatient Medications   Medication Sig Dispense Refill      ACETAMINOPHEN EXTRA STRENGTH 500 MG tablet TAKE TWO TABLETS (1000MG) BY MOUTH THREE TIMES DAILY 180 tablet 97     albuterol (PROAIR RESPICLICK) 108 (90 Base) MCG/ACT inhaler Inhale 2 puffs into the lungs every 6 hours as needed for shortness of breath / dyspnea or wheezing 1 Inhaler 11     atorvastatin (LIPITOR) 40 MG tablet TAKE 1 TABLET BY MOUTH ONCE DAILY 28 tablet 97     benzocaine (ORAJEL/ANBESOL) 10 % gel Take by mouth 4 times daily as needed for moderate pain       bisacodyl (DULCOLAX) 10 MG suppository Place 10 mg rectally daily as needed for constipation       Blood Glucose Monitoring Suppl CORY 2 times daily Call nurse for further directions if blood glucose is less than 80 or greater than 250       BREO ELLIPTA 100-25 MCG/INH inhaler INHALE 1 PUFF BY MOUTH ONCE DAILY 6 Inhaler 97     calcium carbonate (TUMS) 500 MG chewable tablet Take 1 chew tab by mouth 3 times daily as needed       DULoxetine (CYMBALTA) 60 MG capsule TAKE 1 CAPSULE BY MOUTH EVERY MORNING 28 capsule 97     Emollient (MOISTURIZING LOTION EX) Externally apply topically 2 times daily Bilateral lower extremeties       FEROSUL 325 (65 Fe) MG tablet TAKE 1 TABLET BY MOUTH ONCE DAILY 28 tablet 97     furosemide (LASIX) 20 MG tablet TAKE 1 TABLET BY MOUTH ONCE DAILY 28 tablet 97     gabapentin (NEURONTIN) 100 MG capsule Take 100 mg by mouth daily       HYDROmorphone (DILAUDID) 2 MG tablet TAKE 1 TABLET BY MOUTH TWICE DAILY AS NEEDED FOR SEVERE PAIN 12 tablet 0     hypromellose-dextran (GENTEAL TEARS) 0.1-0.3 % ophthalmic solution Place 1 drop into both eyes every 6 hours as needed for dry eyes       levalbuterol (XOPENEX) 1.25 MG/3ML neb solution Take 1 ampule by nebulization every 4 hours as needed for shortness of breath / dyspnea or wheezing And every 4 hours PRN       Lidocaine (LIDOCARE) 4 % Patch Place 1 patch onto the skin daily To desired area (shoulder or hip). On for 12hrs, off for 12hrs 30 patch 11     lisinopril (ZESTRIL) 20 MG  tablet TAKE 1 TABLET BY MOUTH ONCE DAILY 28 tablet 97     Menthol, Topical Analgesic, (BIOFREEZE) 4 % GEL Externally apply topically 4 times daily as needed To left shoulder and right hip       MUCINEX 600 MG 12 hr tablet TAKE 1 TABLET BY MOUTH TWICE DAILY 42 tablet 97     NIFEdipine ER OSMOTIC (PROCARDIA XL) 60 MG 24 hr tablet TAKE 1 TABLET BY MOUTH ONCE DAILY 28 tablet 97     nystatin (MYCOSTATIN) 927454 UNIT/GM external powder Apply topically 3 times daily 1 Bottle 97     omeprazole (PRILOSEC) 40 MG DR capsule TAKE 1 CAPSULE BY MOUTH ONCE DAILY 28 capsule 97     ondansetron (ZOFRAN) 4 MG tablet TAKE 1 TABLET BY MOUTH EVERY 6 HOURS AS NEEDED 30 tablet 97     oxyCODONE (OXYCONTIN) 10 MG 12 hr tablet TAKE ONE TABLET BY MOUTH EVERY 12 HOURS - DO NOT CRUSH 60 tablet 0     predniSONE (DELTASONE) 5 MG tablet TAKE 1 TABLET BY MOUTH ONCE DAILY 31 tablet 97     SENEXON-S 8.6-50 MG tablet TAKE TWO TABLETS BY MOUTH TWICE DAILY 112 tablet 97     tiZANidine (ZANAFLEX) 2 MG tablet Take 2 mg by mouth every 8 hours as needed        VITAMIN D3 25 MCG (1000 UT) tablet TAKE 1 TABLET BY MOUTH ONCE DAILY 28 tablet 97     warfarin ANTICOAGULANT (COUMADIN) 1 MG tablet Take 2 tabs (2 mg) every MWF, and take 3 tabs (3 mg) all other days of the week or as directed by the Anticoagulation Clinic 90 tablet 1         REVIEW OF SYSTEMS:  4 point ROS including Respiratory, CV, GI and , other than that noted in the HPI,  is negative    Objective:  BP (!) 157/82   Pulse 80   Temp 97.3  F (36.3  C)   Resp 20   Wt 75.8 kg (167 lb)   SpO2 91%   BMI 29.58 kg/m    Exam:  GENERAL APPEARANCE:  Alert, in no distress, cooperative  NECK:  decreased ROM to neck   RESP:  respiratory effort and palpation of chest normal, lungs clear to auscultation , no respiratory distress, diminished breath sounds bilat bases, no RA  CV:  Palpation and auscultation of heart done , regular rate and rhythm, no murmur, rub, or gallop, peripheral edema 3-4+ in  BLE  ABDOMEN:  no guarding or rebound, bowel sounds normal, soft, non-tender  M/S:   non ambulatory, unsteady gait, no gross joint deformtiies  SKIN:  Inspection of skin and subcutaneous tissue baseline  NEURO:   Cranial nerves 2-12 are normal tested and grossly at patient's baseline  PSYCH:  oriented X 3, insight and judgement impaired, memory impaired , affect and mood normal    Labs:   Recent labs in [facility] reviewed by me today.     ASSESSMENT/PLAN:  (I48.0) Paroxysmal atrial fibrillation (H)  (primary encounter diagnosis)  Comment: HR controlled, anticoagulated on coumadin, give overall decline and falls, risk >benefit, patient agreeable to stop.   Plan: discontinue coumadin and INR checks.     (R29.6) Recurrent falls  (Z51.5) Hospice care patient  Comment: Continues to decline, continues to require increased care with all ADL's, requiring lift at times for transfers.   Plan: Continue current hospice cares, continues to increase services as needed to meed patient needs.     (G89.4) Pain syndrome, chronic  Comment: Followed by hospice, extensive history of back surgeries, issues with chornic neck shoulder, back and hip pain. H/o pelvic fracture. Pain medications likely contributing to day time sleepiness, but given goal of care is comfort appropriate to continue.   Plan: Continue methadone BID, APAP TID, PRN dilaudid for breakthrough pain, hospice follow    (R13.10) Dysphagia, unspecified type  Comment: Ill fitting dentures contributing, overall decline likely exacerbating  Plan: continue mechanical soft diet as tolerated. Hospice following.     (J44.9) Chronic obstructive pulmonary disease, unspecified COPD type (H)  Comment: Easily SOB, does have to sleep with HOB elevated, not an appropriate candidate for sleep study.   Plan: Continue prednisone, continue PRN albuterol inhaler, PRN ativan. Continue to monitor and adjust.     Orders written by provider at facility  1. DC coumadin and INR  "checks      Electronically signed by:  MARY Gmóez CNP             Annual Wellness Visit    Are you in the first 12 months of your Medicare Part B coverage?  No    Physical Health:    In general, how would you rate your overall physical health? fair    Outside of work, how many days during the week do you exercise?none    Outside of work, approximately how many minutes a day do you exercise?not applicable    If you drink alcohol do you typically have >3 drinks per day or >7 drinks per week? No    Do you usually eat at least 4 servings of fruit and vegetables a day, include whole grains & fiber and avoid regularly eating high fat or \"junk\" foods? Yes    Do you have any problems taking medications regularly? YES    Do you have any side effects from medications? Unsure    Needs assistance for the following daily activities: transportation, preparing meals, housework, bathing, laundry and taking medicine    Which of the following safety concerns are present in your home?  Clutter in room     Hearing impairment: No    In the past 6 months, have you been bothered by leaking of urine? yes    Mental Health:    In general, how would you rate your overall mental or emotional health? fair      PHQ-2 Score:       PHQ-2 ( 1999 [employer]) 10/7/2020 5/30/2019   Q1: Little interest or pleasure in doing things 1 3   Q2: Feeling down, depressed or hopeless 0 0   PHQ-2 Score 1 3   Q1: Little interest or pleasure in doing things - Nearly every day   Q2: Feeling down, depressed or hopeless - Not at all   PHQ-2 Score - 3          Do you feel safe in your environment? Yes    Have you ever done Advance Care Planning? (For example, a Health Directive, POLST, or a discussion with a medical provider or your loved ones about your wishes)? Yes, advance care planning is on file.    Fall risk:  Fallen 2 or more times in the past year?: Yes  Any fall with injury in the past year?: Yes  Timed Up and Go Test (>13.5 is fall risk; contact " physician) : ([facility] - WC bound, newly requiring zahida lift)    Cognitive Screening: Patient refused    Do you have sleep apnea, excessive snoring or daytime drowsiness?: yes    Current providers sharing in care for this patient include:   Patient Care Team:  Junie Estrella APRN CNP as PCP - General (Nurse Practitioner - Gerontology)  Toño Shafer MD as MD (Family Practice)  Aliyah Milan as Medical Assistant (Gerontology)  Sarah Mccabe as Case Management Specialist (Primary Care - CC)  Laura George as Case Management Specialist (Primary Care - CC)  Mirian Weldon LSW as Lead Care Coordinator (Primary Care - CC)  Uma Webber [facility] as Pharmacist (Pharmacist)  Lincoln Brady MD as MD (Family Practice)  Qian Gutierrez MD as MD (OB/Gyn)  Yesy Fong MD as MD (Urology)  Junie Estrella APRN CNP as Referring Physician (Nurse Practitioner - Gerontology)  Tara Silva, SEVERO as Specialty Care Coordinator (Urology)  Norah Foley MD as MD (Family Practice)  Farhat Mendez APRN CNP as Assigned PCP    MARY Gómez CNP                      Sincerely,        MARY Gómez CNP

## 2020-10-07 NOTE — PROGRESS NOTES
[place] [facility]  [place] [facility] Record Number:  [id number]  Place of Service where encounter took place:  [facility] [[id number]]  Chief Complaint   Patient presents with     RECHECK     Anual Wellness       HPI:    Jazmin Clifton  is a 87 year old (12/30/1932), who is being seen today for an episodic care visit.  HPI information obtained from: facility chart records, facility staff, patient report and [facility] chart review. Today's concern is:     Paroxysmal atrial fibrillation (H)  Recurrent falls  Hospice care patient  Pain syndrome, chronic  Dysphagia, unspecified type  Chronic obstructive pulmonary disease, unspecified COPD type (H)   Patient seen today for annual wellness visit. She is currently on hospice, staff report that they have noticed increased decline, she is weaker than usually, requiring more assistance with transfers. She has not been eating well. She is on  Mechanical soft diet, which she does not like, but admits to choking on a regular textured diet and is unable to eat it. Continues to have some intermittent nausea, often does not feel hungry, Staff report she declined breakfast this AM.  She feels tired all the time, continues to have neck and back pain, her pain medications and heat help. She has been getting head aches when she wakes up, but says she also has a very stiff neck when she wakes up.     She is on coumadin for stroke prevention given her afib. INR has been variable. Discussed risk of bleeding given falls and often elevated INR, felt that risk of bleeding likely greater than risk of CVA if coumadin is stopped. She verbalized understanding of this and was agreeable to stopping coumadin.     Past Medical and Surgical History reviewed in Epic today.    MEDICATIONS:    Current Outpatient Medications   Medication Sig Dispense Refill     ACETAMINOPHEN EXTRA STRENGTH 500 MG tablet TAKE TWO TABLETS (1000MG) BY MOUTH THREE TIMES DAILY  180 tablet 97     albuterol (PROAIR RESPICLICK) 108 (90 Base) MCG/ACT inhaler Inhale 2 puffs into the lungs every 6 hours as needed for shortness of breath / dyspnea or wheezing 1 Inhaler 11     atorvastatin (LIPITOR) 40 MG tablet TAKE 1 TABLET BY MOUTH ONCE DAILY 28 tablet 97     benzocaine (ORAJEL/ANBESOL) 10 % gel Take by mouth 4 times daily as needed for moderate pain       bisacodyl (DULCOLAX) 10 MG suppository Place 10 mg rectally daily as needed for constipation       Blood Glucose Monitoring Suppl CORY 2 times daily Call nurse for further directions if blood glucose is less than 80 or greater than 250       BREO ELLIPTA 100-25 MCG/INH inhaler INHALE 1 PUFF BY MOUTH ONCE DAILY 6 Inhaler 97     calcium carbonate (TUMS) 500 MG chewable tablet Take 1 chew tab by mouth 3 times daily as needed       DULoxetine (CYMBALTA) 60 MG capsule TAKE 1 CAPSULE BY MOUTH EVERY MORNING 28 capsule 97     Emollient (MOISTURIZING LOTION EX) Externally apply topically 2 times daily Bilateral lower extremeties       FEROSUL 325 (65 Fe) MG tablet TAKE 1 TABLET BY MOUTH ONCE DAILY 28 tablet 97     furosemide (LASIX) 20 MG tablet TAKE 1 TABLET BY MOUTH ONCE DAILY 28 tablet 97     gabapentin (NEURONTIN) 100 MG capsule Take 100 mg by mouth daily       HYDROmorphone (DILAUDID) 2 MG tablet TAKE 1 TABLET BY MOUTH TWICE DAILY AS NEEDED FOR SEVERE PAIN 12 tablet 0     hypromellose-dextran (GENTEAL TEARS) 0.1-0.3 % ophthalmic solution Place 1 drop into both eyes every 6 hours as needed for dry eyes       levalbuterol (XOPENEX) 1.25 MG/3ML neb solution Take 1 ampule by nebulization every 4 hours as needed for shortness of breath / dyspnea or wheezing And every 4 hours PRN       Lidocaine (LIDOCARE) 4 % Patch Place 1 patch onto the skin daily To desired area (shoulder or hip). On for 12hrs, off for 12hrs 30 patch 11     lisinopril (ZESTRIL) 20 MG tablet TAKE 1 TABLET BY MOUTH ONCE DAILY 28 tablet 97     Menthol, Topical Analgesic, (BIOFREEZE) 4  % GEL Externally apply topically 4 times daily as needed To left shoulder and right hip       MUCINEX 600 MG 12 hr tablet TAKE 1 TABLET BY MOUTH TWICE DAILY 42 tablet 97     NIFEdipine ER OSMOTIC (PROCARDIA XL) 60 MG 24 hr tablet TAKE 1 TABLET BY MOUTH ONCE DAILY 28 tablet 97     nystatin (MYCOSTATIN) 414443 UNIT/GM external powder Apply topically 3 times daily 1 Bottle 97     omeprazole (PRILOSEC) 40 MG DR capsule TAKE 1 CAPSULE BY MOUTH ONCE DAILY 28 capsule 97     ondansetron (ZOFRAN) 4 MG tablet TAKE 1 TABLET BY MOUTH EVERY 6 HOURS AS NEEDED 30 tablet 97     oxyCODONE (OXYCONTIN) 10 MG 12 hr tablet TAKE ONE TABLET BY MOUTH EVERY 12 HOURS - DO NOT CRUSH 60 tablet 0     predniSONE (DELTASONE) 5 MG tablet TAKE 1 TABLET BY MOUTH ONCE DAILY 31 tablet 97     SENEXON-S 8.6-50 MG tablet TAKE TWO TABLETS BY MOUTH TWICE DAILY 112 tablet 97     tiZANidine (ZANAFLEX) 2 MG tablet Take 2 mg by mouth every 8 hours as needed        VITAMIN D3 25 MCG (1000 UT) tablet TAKE 1 TABLET BY MOUTH ONCE DAILY 28 tablet 97     warfarin ANTICOAGULANT (COUMADIN) 1 MG tablet Take 2 tabs (2 mg) every MWF, and take 3 tabs (3 mg) all other days of the week or as directed by the Anticoagulation Clinic 90 tablet 1         REVIEW OF SYSTEMS:  4 point ROS including Respiratory, CV, GI and , other than that noted in the HPI,  is negative    Objective:  BP (!) 157/82   Pulse 80   Temp 97.3  F (36.3  C)   Resp 20   Wt 75.8 kg (167 lb)   SpO2 91%   BMI 29.58 kg/m    Exam:  GENERAL APPEARANCE:  Alert, in no distress, cooperative  NECK:  decreased ROM to neck   RESP:  respiratory effort and palpation of chest normal, lungs clear to auscultation , no respiratory distress, diminished breath sounds bilat bases, no RA  CV:  Palpation and auscultation of heart done , regular rate and rhythm, no murmur, rub, or gallop, peripheral edema 3-4+ in BLE  ABDOMEN:  no guarding or rebound, bowel sounds normal, soft, non-tender  M/S:   non ambulatory, unsteady  gait, no gross joint deformtiies  SKIN:  Inspection of skin and subcutaneous tissue baseline  NEURO:   Cranial nerves 2-12 are normal tested and grossly at patient's baseline  PSYCH:  oriented X 3, insight and judgement impaired, memory impaired , affect and mood normal    Labs:   Recent labs in [facility] reviewed by me today.     ASSESSMENT/PLAN:  (I48.0) Paroxysmal atrial fibrillation (H)  (primary encounter diagnosis)  Comment: HR controlled, anticoagulated on coumadin, give overall decline and falls, risk >benefit, patient agreeable to stop.   Plan: discontinue coumadin and INR checks.     (R29.6) Recurrent falls  (Z51.5) Hospice care patient  Comment: Continues to decline, continues to require increased care with all ADL's, requiring lift at times for transfers.   Plan: Continue current hospice cares, continues to increase services as needed to meed patient needs.     (G89.4) Pain syndrome, chronic  Comment: Followed by hospice, extensive history of back surgeries, issues with chornic neck shoulder, back and hip pain. H/o pelvic fracture. Pain medications likely contributing to day time sleepiness, but given goal of care is comfort appropriate to continue.   Plan: Continue methadone BID, APAP TID, PRN dilaudid for breakthrough pain, hospice follow    (R13.10) Dysphagia, unspecified type  Comment: Ill fitting dentures contributing, overall decline likely exacerbating  Plan: continue mechanical soft diet as tolerated. Hospice following.     (J44.9) Chronic obstructive pulmonary disease, unspecified COPD type (H)  Comment: Easily SOB, does have to sleep with HOB elevated, not an appropriate candidate for sleep study.   Plan: Continue prednisone, continue PRN albuterol inhaler, PRN ativan. Continue to monitor and adjust.     Orders written by provider at facility  1. DC coumadin and INR checks      Electronically signed by:  MARY Gómez CNP             Annual Wellness Visit    Are you in the first 12 months  "of your Medicare Part B coverage?  No    Physical Health:    In general, how would you rate your overall physical health? fair    Outside of work, how many days during the week do you exercise?none    Outside of work, approximately how many minutes a day do you exercise?not applicable    If you drink alcohol do you typically have >3 drinks per day or >7 drinks per week? No    Do you usually eat at least 4 servings of fruit and vegetables a day, include whole grains & fiber and avoid regularly eating high fat or \"junk\" foods? Yes    Do you have any problems taking medications regularly? YES    Do you have any side effects from medications? Unsure    Needs assistance for the following daily activities: transportation, preparing meals, housework, bathing, laundry and taking medicine    Which of the following safety concerns are present in your home?  Clutter in room     Hearing impairment: No    In the past 6 months, have you been bothered by leaking of urine? yes    Mental Health:    In general, how would you rate your overall mental or emotional health? fair      PHQ-2 Score:       PHQ-2 ( 1999 Pfizer) 10/7/2020 5/30/2019   Q1: Little interest or pleasure in doing things 1 3   Q2: Feeling down, depressed or hopeless 0 0   PHQ-2 Score 1 3   Q1: Little interest or pleasure in doing things - Nearly every day   Q2: Feeling down, depressed or hopeless - Not at all   PHQ-2 Score - 3          Do you feel safe in your environment? Yes    Have you ever done Advance Care Planning? (For example, a Health Directive, POLST, or a discussion with a medical provider or your loved ones about your wishes)? Yes, advance care planning is on file.    Fall risk:  Fallen 2 or more times in the past year?: Yes  Any fall with injury in the past year?: Yes  Timed Up and Go Test (>13.5 is fall risk; contact physician) : ([place] - WC bound, newly requiring zahida lift)    Cognitive Screening: Patient refused    Do you have sleep apnea, excessive " snoring or daytime drowsiness?: yes    Current providers sharing in care for this patient include:   Patient Care Team:  Junie Estrella APRN CNP as PCP - General (Nurse Practitioner - Gerontology)  Toño Shafer MD as MD ([facility])  Aliyah Milan as Medical Assistant (Gerontology)  Sarah Mccabe as Case Management Specialist (Primary Care - CC)  Laura George as Case Management Specialist (Primary Care - CC)  Mirian Weldon LSW as Lead Care Coordinator (Primary Care - CC)  Uma Webber [facility] as Pharmacist (Pharmacist)  Lincoln Brady MD as MD ([facility])  Qian Gutierrez MD as MD (OB/Gyn)  Yesy Fong MD as MD (Urology)  Junie Estrella APRN CNP as Referring Physician (Nurse Practitioner - Gerontology)  Tara Silva, RN as Specialty Care Coordinator (Urology)  Norah Foley MD as MD (Family Practice)  Farhat Mendez APRN CNP as Assigned PCP    MARY Gómez CNP

## 2020-10-19 NOTE — PROGRESS NOTES
"ANTICOAGULATION  MANAGEMENT    Jazmin Clifton is being discharged from the [facility] ([facility]).    Reason for discharge: warfarin therapy completed. Per note from halfway visit by Junie Estrella CNP:  \"She is on coumadin for stroke prevention given her afib. INR has been variable. Discussed risk of bleeding given falls and often elevated INR, felt that risk of bleeding likely greater than risk of CVA if coumadin is stopped. She verbalized understanding of this and was agreeable to stopping coumadin.\"    Anticoagulation episode resolved    If patient needs warfarin management in the future, please send a new referral     Michelle Alvarez RN      "

## 2020-10-27 NOTE — PROGRESS NOTES
"[facility]   Jazmin Clifton is being evaluated via a billable video visit.   The patient has been notified of following:  \"This video visit will be conducted via a call between you and your provider. We have found that certain health care needs can be provided without the need for an in-person physical exam.  This service lets us provide the care you need with a video conversation. If during the course of the call the provider feels a video visit is not appropriate, you will not be charged for this service.\"   The provider has received verbal consent for a Video Visit from the patient or first contact? Yes  Patient  or facility staff would like the video invitation sent by: N/A   Video Start Time: 1340    [facility] Record Number:  [id number]  Place of Location at the time of visit: [facility]  Chief Complaint   Patient presents with     Nursing Home Acute     HPI:  Jazmin Clifton  is a 87 year old (12/30/1932), who is being seen today for a visit.  HPI information obtained from: facility chart records, facility staff, patient report and [facility] chart review. Today's concern is:     Chronic obstructive pulmonary disease, unspecified COPD type (H)  Hospice care patient  Paroxysmal atrial fibrillation (H)  Recurrent falls  Pain syndrome, chronic  DDD (degenerative disc disease), lumbar  Chronic left shoulder pain  Dysphagia, unspecified type  Congestive heart failure, unspecified HF chronicity, unspecified heart failure type (H)  Essential hypertension  Depression with anxiety  Anemia of chronic renal failure, stage 4 (severe) (H)  Gastroesophageal reflux disease without esophagitis  Stage 3b chronic kidney disease  Slow transit constipation   Patient being seen urgently today per request of [facility] staff. Jazmin is currently on hospice, and has had significant decline in the past 1 week, now requiring EZ stand or lift for transfers at times. She has also had 3 falls in the past 3 " "days - all falls appear to be falls out of bed. She is non-ambulatory, uses are wheelchair for mobility. Hospice has been updated on falls, concern that methadone could be contributing, so plan to decrease dosing. Staff also report patient has been having increased difficulty with eating - having worsening coughing with meals and is often refusing meals. She does have dentures, but finds them uncomfortable so she does not use them.  She refused breakfast and lunch today, refused most of her morning medications except for her methadone. Jazmin is confused on visit today, asking if she is in her room, repeatedly asked what is going on. Also questioning if toilet paper was brought in- when asked if she needed toilet paper, she stated no. When asked how she is feeling today, she stated \"pretty good.\" Often would stare at provider rather than answer question. She denies any SOB, states she is having no pain. Often slow to answer questions. She Is concerned as she keeps falling and does not know why. Staff reportsmj has been sleeping a lot more during the day and note increased confusion and periods of decreased responsiveness. Jazmin states she thinks there is something wrong with her bed as it is not very comfortable. No hypoxia noted currently and she is on RA, staff report after fall yesterday her O2 sats were 81%, they note coughing with eating, but otherwise no wheezing, coughing or SOB. Family is aware of plan to transfer patient to TCU.     Past Medical and Surgical History reviewed in Epic today.  MEDICATIONS:    Current Outpatient Medications   Medication Sig Dispense Refill     ACETAMINOPHEN EXTRA STRENGTH 500 MG tablet TAKE TWO TABLETS (1000MG) BY MOUTH THREE TIMES DAILY 180 tablet 97     albuterol (PROAIR RESPICLICK) 108 (90 Base) MCG/ACT inhaler Inhale 2 puffs into the lungs every 6 hours as needed for shortness of breath / dyspnea or wheezing 1 Inhaler 11     atropine 1 % ophthalmic solution Place 1-2 drops " under the tongue every 2 hours as needed       benzocaine (ORAJEL/ANBESOL) 10 % gel Take by mouth 4 times daily as needed for moderate pain       bisacodyl (DULCOLAX) 10 MG suppository Place 10 mg rectally daily as needed for constipation       Blood Glucose Monitoring Suppl [facility] 2 times daily Call nurse for further directions if blood glucose is less than 80 or greater than 250       calcium carbonate (TUMS) 500 MG chewable tablet Take 1 chew tab by mouth 3 times daily as needed       DULoxetine (CYMBALTA) 60 MG capsule TAKE 1 CAPSULE BY MOUTH EVERY MORNING 28 capsule 97     Emollient (MOISTURIZING LOTION EX) Externally apply topically 2 times daily Bilateral lower extremeties       furosemide (LASIX) 20 MG tablet TAKE 1 TABLET BY MOUTH ONCE DAILY 28 tablet 97     haloperidol (HALDOL) 0.5 MG tablet Take 1 tablet (0.5 mg) by mouth every 6 hours as needed for agitation       HYDROmorphone (DILAUDID) 2 MG tablet TAKE 1 TABLET BY MOUTH TWICE DAILY AS NEEDED FOR SEVERE PAIN 12 tablet 0     hypromellose-dextran (GENTEAL TEARS) 0.1-0.3 % ophthalmic solution Place 1 drop into both eyes every 6 hours as needed for dry eyes       Lidocaine (LIDOCARE) 4 % Patch Place 1 patch onto the skin daily To desired area (shoulder or hip). On for 12hrs, off for 12hrs 30 patch 11     lisinopril (ZESTRIL) 20 MG tablet TAKE 1 TABLET BY MOUTH ONCE DAILY 28 tablet 97     LORazepam (ATIVAN) 0.5 MG tablet Take 1 tablet (0.5 mg) by mouth every 4 hours as needed for anxiety       Menthol, Topical Analgesic, (BIOFREEZE) 4 % GEL Externally apply topically 4 times daily as needed To left shoulder and right hip       methadone (DOLOPHINE) 5 MG tablet Take 1 tablet (5 mg) by mouth every 12 hours  0     MUCINEX 600 MG 12 hr tablet TAKE 1 TABLET BY MOUTH TWICE DAILY 42 tablet 97     NIFEdipine ER OSMOTIC (PROCARDIA XL) 60 MG 24 hr tablet TAKE 1 TABLET BY MOUTH ONCE DAILY 28 tablet 97     nystatin (MYCOSTATIN) 701604 UNIT/GM external powder Apply  "topically 3 times daily 1 Bottle 97     omeprazole (PRILOSEC) 40 MG DR capsule TAKE 1 CAPSULE BY MOUTH ONCE DAILY 28 capsule 97     ondansetron (ZOFRAN) 4 MG tablet TAKE 1 TABLET BY MOUTH EVERY 6 HOURS AS NEEDED 30 tablet 97     predniSONE (DELTASONE) 5 MG tablet TAKE 1 TABLET BY MOUTH ONCE DAILY 31 tablet 97     prochlorperazine (COMPAZINE) 10 MG tablet Take 1 tablet (10 mg) by mouth 3 times daily       SENEXON-S 8.6-50 MG tablet TAKE TWO TABLETS BY MOUTH TWICE DAILY 112 tablet 97       Past Medical History:   Diagnosis Date     ABDOMINAL PAIN GENERALIZED 3/15/2006    1 month of abdominal pain that bryn radiates into her back and chest.  Pt was hospitilzed 2x for the pain at [facility] --pt hopsitized for 3 days.  Rcords not ab=vailable.  She was told either \" twisted intestine or blockage of bowel.  Pt feels it is the hiatal hernia.  Pt hospitilized again a second time  A month ago.  Ct scans x 2 showed \" nothing.\"  Pt had a barium and xrays.  Pt sa     Abdominal pain, generalized 3/15/2006    1 month of abdominal pain that ashleyqwhicfrederick radiates into her back and chest.  Pt was hospitilzed 2x for the pain at [facility] --pt hopsitized for 3 days.  Rcords not ab=vailable.  She was told either \" twisted intestine or blockage of bowel.  Pt feels it is the hiatal hernia.  Pt hospitilized again a second time  A month ago.  Ct scans x 2 showed \" nothing.\"  Pt had a barium and xrays.  Pt sa     Atherosclerosis of renal artery (H)     Left renal artery stenosis     BENIGN HYPERTENSION 5/1/2006 5/1/2006   Increase catapres to 0.3 mg and decrease clonidine to 0.1 mg daily.  Recheck bp in 1 month.      Benign neoplasm of scalp and skin of neck     Seborrheic keratosis     Cerebral aneurysm, nonruptured     Cerebral Aneurysm     Congestive heart failure (H)      Depressive disorder, not elsewhere classified     Depression     Esophageal reflux     Gastroesophageal Reflux Disease     Female stress incontinence "      Gastrointestinal malfunction arising from mental factors     Dyspepsia     Generalized osteoarthrosis, unspecified site     DJD-chronic back pain     Herpes zoster dermatitis of eyelid      Insomnia, unspecified      Lumbago     Chronic back pain     Other chest pain     Atypical Chest Pain     Other specified cardiac dysrhythmias(427.89)     Bradycardia, improved on lower dose beta blockers      Rectocele     Grade 3     Unspecified disorder of kidney and ureter     Renal insufficiency     Unspecified essential hypertension        Past Surgical History:   Procedure Laterality Date     CHOLECYSTECTOMY, LAPOROSCOPIC  1997    Cholecystectomy, Laparoscopic     CYSTOSCOPY, BIOPSY URETHRA N/A 6/10/2019    Procedure: Cystoscopy With Biopsy, Removal Of Urethral Lesion;  Surgeon: Yesy Fong MD;  Location: [facility] OR     ENDARTERECTOMY CAROTID Right 8/31/2017    Procedure: ENDARTERECTOMY CAROTID;  RIGHT CAROTID ENDARTERECTOMY WITH EEG;  Surgeon: Hilario Parry MD;  Location: [facility] OR     HYSTERECTOMY, RAYMOND  1982    oophorectomy,RAYMOND     SURGICAL HISTORY OF -       Laminectomy x 3     SURGICAL HISTORY OF -       MCA Aneurysm repair     SURGICAL HISTORY OF -   1996    Bladder suspension (Bladder Repair x2)     SURGICAL HISTORY OF -       Bilateral Hand Surgeries for Arthritis x2     SURGICAL HISTORY OF -       Repair of a Cerebral Aneurysm     URETHROPLASTY N/A 6/10/2019    Procedure: Urethral Reconstruction;  Surgeon: Yesy Fong MD;  Location: [facility] OR       Family History   Problem Relation Age of Onset     Cancer Mother         stomache     Cerebrovascular Disease Father      Heart Disease Father      Cancer Brother         lymphoma     Eye Disorder Son      Asthma Son      Diabetes Son      Gastrointestinal Disease Son         no control over bladder or bowels     C.A.D. No family hx of      Hypertension No family hx of      Breast Cancer No family hx of      Cancer - colorectal No family hx of       "Prostate Cancer No family hx of        Social History     Tobacco Use     Smoking status: Former Smoker     Years: 50.00     Quit date: 1992     Years since quittin.8     Smokeless tobacco: Never Used   Substance Use Topics     Alcohol use: Not Currently     Comment: wine occas.       REVIEW OF SYSTEMS: 4 point ROS including Respiratory, CV, GI and , other than that noted in the HPI,  is negative  Objective: /78   Pulse 68   Temp 96.3  F (35.7  C)   Resp 19   Ht 1.6 m (5' 3\")   Wt 76.2 kg (168 lb)   SpO2 93%   BMI 29.76 kg/m    Limited visit exam done given COVID-19 precautions.   GENERAL APPEARANCE:  Alert, cooperative  RESP:  no respiratory distress  M/S:   non ambulatory, no gross joint deformities noted  SKIN:  Inspection of skin and subcutaneous tissue baseline, exam limited.   NEURO:   speech clear, slow to answers questions, consfused conversation  PSYCH:  insight and judgement impaired, memory impaired , coni affect     Labs:   Recent labs in EPIC reviewed by me today.     ASSESSMENT/PLAN:  (J44.9) Chronic obstructive pulmonary disease, unspecified COPD type (H)  (primary encounter diagnosis)  (Z51.5) Hospice care patient  Comment: Noted recent decline, limited activity tolerance but no s/s of exacerbation currently. Requiring increased assistances with all ADL's and no longer safe in apartment - agree appropriate to move to TCU for hospice cares.   Plan: Continue prednisone, albuterol MDI, Continue hospice cares - will transfer to TCU today. Will discontinue mucinex due to swallowing issues and to decrease pill burden,     (I48.0) Paroxysmal atrial fibrillation (H)  Comment: HR controlled, no longer on anticoagulation due to falls, decline as risk felt to be greater than beenfit  Plan: continue to monitor,     (R29.6) Recurrent falls  Comment: 3 falls in the past 3 days, likely multifactorial due to worsening confusion, weakness, pain medication and overall decline, she is on " hospice  Plan: Move to TCU for hospice cares as able to more closely monitor for safety. Remains a high falls risk, and is likely to fall again.     (G89.4) Pain syndrome, chronic  (M51.36) DDD (degenerative disc disease), lumbar  (M25.512,  G89.29) Chronic left shoulder pain  Comment: Multiple previous back surgeries, hospice concerned methadone contributing to falls and confusion so plan to decreased from 5mg BID  Plan: Methadone 2.5mg q AM and 5mg BID, continue dilaudid BID PRN, APAP TID, prednisone, lidocaine patch, biofreeze, hospice following.     (R13.10) Dysphagia, unspecified type  Comment: h/o of CVA, does not wear dentures as they are uncomfortable. Staff have noted increase coughing with meals, often refusing meals and medication  Plan: Continue mechanical soft diet as tolerated, thin liquids, will     (I50.9) Congestive heart failure, unspecified HF chronicity, unspecified heart failure type (H)  (I10) Essential hypertension  Comment: Appears compensated, does have some chronic lower extremity edema. BP has ranged from 90-160s recently. Has been refusing BP medications at times.   Plan: Will discontinue lisinopril and nifedipine to reduce pill burden and prevent hypotension and hopefully reduce falls risk. Continue lasix, monitor and adjust PRN>     (F41.8) Depression with anxiety  Comment: Staff report some anxiety, flat affect on exam today.   Plan: continue duloxetine 60mg daily, continue PRN haldol for anxiety/terminal agitation- will need 14 day stop date for review of use in TCU. Continue to monitor and adjust    (N18.4,  D63.1) Anemia of chronic renal failure, stage 4 (severe) (H)  Comment: no s/s of bleeding  Plan: monitor PRN    (K21.9) Gastroesophageal reflux disease without esophagitis  Comment: ? Contributing to dysphagia  Plan: continue omeprazole, tums PRN    (N18.32) Stage 3b chronic kidney disease  Comment:   Plan: avoid nephrotoxic medications, renally dose medications as appropriate.      (K59.01) Slow transit constipation  Comment: recent loose stools, now resolved to laxative resumed per hospice   Plan: continue senna-s 2 tabs BID, bisacodyl supp PRN, continue to monitor and adjust    Orders written by provider at facility (via bridge)  1. DC lisinopril, mucinex, nifedipine  2. Ok to send narcotics and other medications with patient to TCU.  3. OK to admit to [facility] [place] [facility] - written forms/orders faxed to facility  4. discontinue orajel, scheduled TUMs (continue PRN),   5. discontinue PRN lidocaine patch - continue scheduled    Total time spent with patient visit was 62 minutes including patient visit and review of past records. Greater than 50% of total time spent with counseling and coordinating care due to urgent transfer to TCU/LTC as assisted no longer able to provide cares; had multiple discussions  with nursing staff as well TCU  for care coordination as well as review of medications, discussion fo plan of care and patient education with patient due to decline on hospice, need to transfer to TCU due to increased care needs and no longer safe in apartment, COPD, CHF, chronic pain, multiple recent falls, dysphagia with increased difficulty swallowing medications, constipation, HTN, and afib.    Electronically signed by:  MARY Gómez CNP     Video-Visit Details  Type of service:  Video Visit  Video End Time (time video stopped): 1408  Distant Location (provider location):  [facility]

## 2020-10-27 NOTE — LETTER
"    10/27/2020        RE: Jazmin Clifton  [address] [facility] [address] [address]        [facility]   Jazmin Clifton is being evaluated via a billable video visit.   The patient has been notified of following:  \"This video visit will be conducted via a call between you and your provider. We have found that certain health care needs can be provided without the need for an in-person physical exam.  This service lets us provide the care you need with a video conversation. If during the course of the call the provider feels a video visit is not appropriate, you will not be charged for this service.\"   The provider has received verbal consent for a Video Visit from the patient or first contact? Yes  Patient  or facility staff would like the video invitation sent by: N/A   Video Start Time: 1340    [place] Medical Record Number:  [id number]  Place of Location at the time of visit: [facility]  Chief Complaint   Patient presents with     Nursing Home Acute     HPI:  Jazmin Clifton  is a 87 year old (12/30/1932), who is being seen today for a visit.  HPI information obtained from: facility chart records, facility staff, patient report and [facility] chart review. Today's concern is:     Chronic obstructive pulmonary disease, unspecified COPD type (H)  Hospice care patient  Paroxysmal atrial fibrillation (H)  Recurrent falls  Pain syndrome, chronic  DDD (degenerative disc disease), lumbar  Chronic left shoulder pain  Dysphagia, unspecified type  Congestive heart failure, unspecified HF chronicity, unspecified heart failure type (H)  Essential hypertension  Depression with anxiety  Anemia of chronic renal failure, stage 4 (severe) (H)  Gastroesophageal reflux disease without esophagitis  Stage 3b chronic kidney disease  Slow transit constipation   Patient being seen urgently today per request of [facility] staff. Jazmin is currently on hospice, and has had significant decline in the past 1 week, now " "requiring EZ stand or lift for transfers at times. She has also had 3 falls in the past 3 days - all falls appear to be falls out of bed. She is non-ambulatory, uses are wheelchair for mobility. Hospice has been updated on falls, concern that methadone could be contributing, so plan to decrease dosing. Staff also report patient has been having increased difficulty with eating - having worsening coughing with meals and is often refusing meals. She does have dentures, but finds them uncomfortable so she does not use them.  She refused breakfast and lunch today, refused most of her morning medications except for her methadone. Jazmin is confused on visit today, asking if she is in her room, repeatedly asked what is going on. Also questioning if toilet paper was brought in- when asked if she needed toilet paper, she stated no. When asked how she is feeling today, she stated \"pretty good.\" Often would stare at provider rather than answer question. She denies any SOB, states she is having no pain. Often slow to answer questions. She Is concerned as she keeps falling and does not know why. Staff reportsmj has been sleeping a lot more during the day and note increased confusion and periods of decreased responsiveness. Jazmin states she thinks there is something wrong with her bed as it is not very comfortable. No hypoxia noted currently and she is on RA, staff report after fall yesterday her O2 sats were 81%, they note coughing with eating, but otherwise no wheezing, coughing or SOB. Family is aware of plan to transfer patient to TCU.     Past Medical and Surgical History reviewed in Epic today.  MEDICATIONS:    Current Outpatient Medications   Medication Sig Dispense Refill     ACETAMINOPHEN EXTRA STRENGTH 500 MG tablet TAKE TWO TABLETS (1000MG) BY MOUTH THREE TIMES DAILY 180 tablet 97     albuterol (PROAIR RESPICLICK) 108 (90 Base) MCG/ACT inhaler Inhale 2 puffs into the lungs every 6 hours as needed for shortness of " breath / dyspnea or wheezing 1 Inhaler 11     atropine 1 % ophthalmic solution Place 1-2 drops under the tongue every 2 hours as needed       benzocaine (ORAJEL/ANBESOL) 10 % gel Take by mouth 4 times daily as needed for moderate pain       bisacodyl (DULCOLAX) 10 MG suppository Place 10 mg rectally daily as needed for constipation       Blood Glucose Monitoring Suppl [facility] 2 times daily Call nurse for further directions if blood glucose is less than 80 or greater than 250       calcium carbonate (TUMS) 500 MG chewable tablet Take 1 chew tab by mouth 3 times daily as needed       DULoxetine (CYMBALTA) 60 MG capsule TAKE 1 CAPSULE BY MOUTH EVERY MORNING 28 capsule 97     Emollient (MOISTURIZING LOTION EX) Externally apply topically 2 times daily Bilateral lower extremeties       furosemide (LASIX) 20 MG tablet TAKE 1 TABLET BY MOUTH ONCE DAILY 28 tablet 97     haloperidol (HALDOL) 0.5 MG tablet Take 1 tablet (0.5 mg) by mouth every 6 hours as needed for agitation       HYDROmorphone (DILAUDID) 2 MG tablet TAKE 1 TABLET BY MOUTH TWICE DAILY AS NEEDED FOR SEVERE PAIN 12 tablet 0     hypromellose-dextran (GENTEAL TEARS) 0.1-0.3 % ophthalmic solution Place 1 drop into both eyes every 6 hours as needed for dry eyes       Lidocaine (LIDOCARE) 4 % Patch Place 1 patch onto the skin daily To desired area (shoulder or hip). On for 12hrs, off for 12hrs 30 patch 11     lisinopril (ZESTRIL) 20 MG tablet TAKE 1 TABLET BY MOUTH ONCE DAILY 28 tablet 97     LORazepam (ATIVAN) 0.5 MG tablet Take 1 tablet (0.5 mg) by mouth every 4 hours as needed for anxiety       Menthol, Topical Analgesic, (BIOFREEZE) 4 % GEL Externally apply topically 4 times daily as needed To left shoulder and right hip       methadone (DOLOPHINE) 5 MG tablet Take 1 tablet (5 mg) by mouth every 12 hours  0     MUCINEX 600 MG 12 hr tablet TAKE 1 TABLET BY MOUTH TWICE DAILY 42 tablet 97     NIFEdipine ER OSMOTIC (PROCARDIA XL) 60 MG 24 hr tablet TAKE 1 TABLET BY  "MOUTH ONCE DAILY 28 tablet 97     nystatin (MYCOSTATIN) 358372 UNIT/GM external powder Apply topically 3 times daily 1 Bottle 97     omeprazole (PRILOSEC) 40 MG DR capsule TAKE 1 CAPSULE BY MOUTH ONCE DAILY 28 capsule 97     ondansetron (ZOFRAN) 4 MG tablet TAKE 1 TABLET BY MOUTH EVERY 6 HOURS AS NEEDED 30 tablet 97     predniSONE (DELTASONE) 5 MG tablet TAKE 1 TABLET BY MOUTH ONCE DAILY 31 tablet 97     prochlorperazine (COMPAZINE) 10 MG tablet Take 1 tablet (10 mg) by mouth 3 times daily       SENEXON-S 8.6-50 MG tablet TAKE TWO TABLETS BY MOUTH TWICE DAILY 112 tablet 97       Past Medical History:   Diagnosis Date     ABDOMINAL PAIN GENERALIZED 3/15/2006    1 month of abdominal pain that bryn radiates into her back and chest.  Pt was hospitilzed 2x for the pain at [facility] --pt hopsitized for 3 days.  Rcords not ab=vailable.  She was told either \" twisted intestine or blockage of bowel.  Pt feels it is the hiatal hernia.  Pt hospitilized again a second time  A month ago.  Ct scans x 2 showed \" nothing.\"  Pt had a barium and xrays.  Pt sa     Abdominal pain, generalized 3/15/2006    1 month of abdominal pain that ashleyqwandrea radiates into her back and chest.  Pt was hospitilzed 2x for the pain at [facility] --pt hopsitized for 3 days.  Rcords not ab=vailable.  She was told either \" twisted intestine or blockage of bowel.  Pt feels it is the hiatal hernia.  Pt hospitilized again a second time  A month ago.  Ct scans x 2 showed \" nothing.\"  Pt had a barium and xrays.  Pt sa     Atherosclerosis of renal artery (H)     Left renal artery stenosis     BENIGN HYPERTENSION 5/1/2006 5/1/2006   Increase catapres to 0.3 mg and decrease clonidine to 0.1 mg daily.  Recheck bp in 1 month.      Benign neoplasm of scalp and skin of neck     Seborrheic keratosis     Cerebral aneurysm, nonruptured     Cerebral Aneurysm     Congestive heart failure (H)      Depressive disorder, not elsewhere classified     " Depression     Esophageal reflux     Gastroesophageal Reflux Disease     Female stress incontinence      Gastrointestinal malfunction arising from mental factors     Dyspepsia     Generalized osteoarthrosis, unspecified site     DJD-chronic back pain     Herpes zoster dermatitis of eyelid      Insomnia, unspecified      Lumbago     Chronic back pain     Other chest pain     Atypical Chest Pain     Other specified cardiac dysrhythmias(427.89)     Bradycardia, improved on lower dose beta blockers      Rectocele     Grade 3     Unspecified disorder of kidney and ureter     Renal insufficiency     Unspecified essential hypertension        Past Surgical History:   Procedure Laterality Date     CHOLECYSTECTOMY, LAPOROSCOPIC  1997    Cholecystectomy, Laparoscopic     CYSTOSCOPY, BIOPSY URETHRA N/A 6/10/2019    Procedure: Cystoscopy With Biopsy, Removal Of Urethral Lesion;  Surgeon: Yesy Fong MD;  Location: [facility] OR     ENDARTERECTOMY CAROTID Right 8/31/2017    Procedure: ENDARTERECTOMY CAROTID;  RIGHT CAROTID ENDARTERECTOMY WITH EEG;  Surgeon: Hilario Parry MD;  Location: [facility] OR     HYSTERECTOMY, RAYMOND  1982    oophorectomy,RAYMOND     SURGICAL HISTORY OF -       Laminectomy x 3     SURGICAL HISTORY OF -       MCA Aneurysm repair     SURGICAL HISTORY OF -   1996    Bladder suspension (Bladder Repair x2)     SURGICAL HISTORY OF -       Bilateral Hand Surgeries for Arthritis x2     SURGICAL HISTORY OF -       Repair of a Cerebral Aneurysm     URETHROPLASTY N/A 6/10/2019    Procedure: Urethral Reconstruction;  Surgeon: Yesy Fong MD;  Location: [facility] OR       Family History   Problem Relation Age of Onset     Cancer Mother         stomache     Cerebrovascular Disease Father      Heart Disease Father      Cancer Brother         lymphoma     Eye Disorder Son      Asthma Son      Diabetes Son      Gastrointestinal Disease Son         no control over bladder or bowels     C.A.D. No family hx of       "Hypertension No family hx of      Breast Cancer No family hx of      Cancer - colorectal No family hx of      Prostate Cancer No family hx of        Social History     Tobacco Use     Smoking status: Former Smoker     Years: 50.00     Quit date: 1992     Years since quittin.8     Smokeless tobacco: Never Used   Substance Use Topics     Alcohol use: Not Currently     Comment: wine occas.       REVIEW OF SYSTEMS: 4 point ROS including Respiratory, CV, GI and , other than that noted in the HPI,  is negative  Objective: /78   Pulse 68   Temp 96.3  F (35.7  C)   Resp 19   Ht 1.6 m (5' 3\")   Wt 76.2 kg (168 lb)   SpO2 93%   BMI 29.76 kg/m    Limited visit exam done given COVID-19 precautions.   GENERAL APPEARANCE:  Alert, cooperative  RESP:  no respiratory distress  M/S:   non ambulatory, no gross joint deformities noted  SKIN:  Inspection of skin and subcutaneous tissue baseline, exam limited.   NEURO:   speech clear, slow to answers questions, consfused conversation  PSYCH:  insight and judgement impaired, memory impaired , coni affect     Labs:   Recent labs in [facility] reviewed by me today.     ASSESSMENT/PLAN:  (J44.9) Chronic obstructive pulmonary disease, unspecified COPD type (H)  (primary encounter diagnosis)  (Z51.5) Hospice care patient  Comment: Noted recent decline, limited activity tolerance but no s/s of exacerbation currently. Requiring increased assistances with all ADL's and no longer safe in apartment - agree appropriate to move to TCU for hospice cares.   Plan: Continue prednisone, albuterol MDI, Continue hospice cares - will transfer to TCU today. Will discontinue mucinex due to swallowing issues and to decrease pill burden,     (I48.0) Paroxysmal atrial fibrillation (H)  Comment: HR controlled, no longer on anticoagulation due to falls, decline as risk felt to be greater than beenfit  Plan: continue to monitor,     (R29.6) Recurrent falls  Comment: 3 falls in the past 3 days, " likely multifactorial due to worsening confusion, weakness, pain medication and overall decline, she is on hospice  Plan: Move to TCU for hospice cares as able to more closely monitor for safety. Remains a high falls risk, and is likely to fall again.     (G89.4) Pain syndrome, chronic  (M51.36) DDD (degenerative disc disease), lumbar  (M25.512,  G89.29) Chronic left shoulder pain  Comment: Multiple previous back surgeries, hospice concerned methadone contributing to falls and confusion so plan to decreased from 5mg BID  Plan: Methadone 2.5mg q AM and 5mg BID, continue dilaudid BID PRN, APAP TID, prednisone, lidocaine patch, biofreeze, hospice following.     (R13.10) Dysphagia, unspecified type  Comment: h/o of CVA, does not wear dentures as they are uncomfortable. Staff have noted increase coughing with meals, often refusing meals and medication  Plan: Continue mechanical soft diet as tolerated, thin liquids, will     (I50.9) Congestive heart failure, unspecified HF chronicity, unspecified heart failure type (H)  (I10) Essential hypertension  Comment: Appears compensated, does have some chronic lower extremity edema. BP has ranged from 90-160s recently. Has been refusing BP medications at times.   Plan: Will discontinue lisinopril and nifedipine to reduce pill burden and prevent hypotension and hopefully reduce falls risk. Continue lasix, monitor and adjust PRN>     (F41.8) Depression with anxiety  Comment: Staff report some anxiety, flat affect on exam today.   Plan: continue duloxetine 60mg daily, continue PRN haldol for anxiety/terminal agitation- will need 14 day stop date for review of use in TCU. Continue to monitor and adjust    (N18.4,  D63.1) Anemia of chronic renal failure, stage 4 (severe) (H)  Comment: no s/s of bleeding  Plan: monitor PRN    (K21.9) Gastroesophageal reflux disease without esophagitis  Comment: ? Contributing to dysphagia  Plan: continue omeprazole, tums PRN    (N18.32) Stage 3b chronic  kidney disease  Comment:   Plan: avoid nephrotoxic medications, renally dose medications as appropriate.     (K59.01) Slow transit constipation  Comment: recent loose stools, now resolved to laxative resumed per hospice   Plan: continue senna-s 2 tabs BID, bisacodyl supp PRN, continue to monitor and adjust    Orders written by provider at facility (via bridge)  1. DC lisinopril, mucinex, nifedipine  2. Ok to send narcotics and other medications with patient to TCU.  3. OK to admit to [facility] [address] [facility] - written forms/orders faxed to facility  4. discontinue orajel, scheduled TUMs (continue PRN),   5. discontinue PRN lidocaine patch - continue scheduled    Total time spent with patient visit was 62 minutes including patient visit and review of past records. Greater than 50% of total time spent with counseling and coordinating care due to urgent transfer to TCU/LTC as correction no longer able to provide cares; had multiple discussions  with nursing staff as well TCU  for care coordination as well as review of medications, discussion fo plan of care and patient education with patient due to decline on hospice, need to transfer to TCU due to increased care needs and no longer safe in apartment, COPD, CHF, chronic pain, multiple recent falls, dysphagia with increased difficulty swallowing medications, constipation, HTN, and afib.    Electronically signed by:  MARY Gómez CNP     Video-Visit Details  Type of service:  Video Visit  Video End Time (time video stopped): 1408  Distant Location (provider location):  [place] [facility]               Sincerely,        MARY Gómez CNP

## 2020-10-29 NOTE — PROGRESS NOTES
TRANSITIONS OF CARE (AJAY) LOG   AJAY tasks should be completed by the CC within one (1) business day of notification of each transition. Follow up contact with member is required after return to their usual care setting.  Note:  If CC finds out about the transitions fifteen (15) days or more after the member has returned to their usual care setting, no AJAY log is needed. However, the CC should check in with the member to discuss the transition process, any changes needed to the care plan and document it in a case note.    Member Name:  Jazmin Clifton [facility] Name:  [employer] Member ID#: [id number]   Product: [facility] Care Coordinator Contact:  Mirian Weldon MA, W   Marli Miller RN Agency/[facility]: [facility]   Transition Communication Actions from Care Management Contact   Transition #1   Notification Date: 10-28-20 Transition Date:   10-27-20 Transition From: Assisted Living, [facility]     Is this the member s usual care setting?               yes Transition To: Skilled Nursing Facility, [facility] [address] L[facility]/U   Transition Type:  Planned  Reason for Admission/Comments:  10-27-20 urgent transfer to TCU/LTC as [facility] no longer able to provide cares moved to TCU long term bed with Hospice (adm 20).       Shared CC contact info, care plan/services with receiving setting--Date completed: 10-27-20    Notified PCP of transition--Date completed:  10-27-20     via  EMR   Transition #2   Transition #3  (if applicable)   Notification Date: 20         Transition To:  member  at NH with Hospice  Transition Date: 10-31-20     Transition Type:    Unplanned  Notified PCP -- Date completed: 10-31-20              Shared CC contact info, care plan/services with receiving setting or, if applicable, home care agency--Date completed:  10-31-20  *Complete additional tasks below, if this transition is a return to usual care setting.      Comments:  Member .  CC will  complete Disenrollment process.  Marli Miller RN PHN  [facility]   [phone]      Notification Date:          Transition To:    Transition Date:              Transition Type:      Notified PCP--Date completed:          Shared CC contact info, care plan/services with receiving setting or, if applicable, home care agency--Date completed:       *Complete additional tasks below, if this transition is a return to usual care setting.      Comments:       *Complete tasks below when the member is discharging TO their usual care setting within one (1) business day of notification.  For situations where the Care Coordinator is notified of the discharge prior to the date of discharge, the Care Coordinator must follow up with the member or designated representative to confirm that discharge actually occurred and discuss required AJAY tasks as outlined in the AJAY Instructions.  (This includes situations where it may be a  new  usual care setting for the member. (i.e., a community member who decides upon permanent nursing home placement following hospitalization and rehab).    Date completed: 11-2-20  Communicated with member or their designated representative about the following:  care transition process; about changes to the member s health status; plan of care updates; education about transitions and how to prevent unplanned transitions/readmissions  Four Pillars for Optimal Transition:    Check  Yes  - if the member, family member and/or SNF/facility staff manages the following:    If  No  provide explanation in the comments section.          []  Yes     []  No     Does the member have a follow-up appointment scheduled with primary care or specialist? (Mental health hospitalizations--the appt. should be w/in 7 days)   []  Yes     []  No     Can the member manage their medications or is there a system in place to manage medications (e.g. home care set-up)?         []  Yes     []  No     Can the member  verbalize warning signs and symptoms to watch for and how to respond?         []  Yes     []  No     Does the member use a Personal Health Care Record?  Check  Yes  if visit summary, discharge summary, and/or healthcare summary are being used as a PHR.                                                                                                                                                                                    [] Yes      [] No      Have you updated the member s care plan?  If  No  provide explanation in comments.   Comments:  Member  10-31-20

## 2020-10-30 NOTE — LETTER
10/30/2020        RE: Jazmin Clifton  [address] [facility] [address]        [place] [facility]  [place] [facility] Record Number:  [id number]  Place of Service where encounter took place:  [facility] ([facility]) [[id number]]  Chief Complaint   Patient presents with     Nursing Home Acute       HPI:    Jazmin Clifton  is a 87 year old (12/30/1932), who is being seen today for an episodic care visit.  HPI information obtained from: facility chart records, facility staff, patient report and [facility] chart review. Today's concern is:     Chronic obstructive pulmonary disease, unspecified COPD type (H)  Congestive heart failure, unspecified HF chronicity, unspecified heart failure type (H)  Hospice care patient  Paroxysmal atrial fibrillation (H)  Pain syndrome, chronic  Chronic pain syndrome   Patient on hospice, recently transferred over to TCU from [facility] as [facility] staff no longer felt they could safely care for her. She is seen today in room, she is minimally responsive, but does make eye contact. Nursing staff report some moaning with position change. She has been tachycardic since shortly after TCU admission, does have a history of afib, has not been able to take oral medications. Family has been visiting frequently this AM>     Past Medical and Surgical History reviewed in Epic today.    MEDICATIONS:    Current Outpatient Medications   Medication Sig Dispense Refill     acetaminophen (TYLENOL) 650 MG suppository Place 1 suppository (650 mg) rectally every 4 hours as needed for fever       HYDROmorphone (DILAUDID) 2 MG tablet Take 0.5 tablets (1 mg) by mouth every 4 hours as needed for severe pain TAKE 1 TABLET BY MOUTH TWICE DAILY AS NEEDED FOR SEVERE PAIN 12 tablet 0     HYDROmorphone, STANDARD CONC, (DILAUDID) 1 MG/ML oral solution Take 1 mL (1 mg) by mouth every 2 hours as needed for severe pain  0     LORazepam (ATIVAN) 2 MG/ML (HIGH CONC) solution Take 0.25 mLs (0.5 mg) by  "mouth 2 times daily. May also take 0.25 mLs (0.5 mg) every 4 hours as needed (terminal agitation).       albuterol (PROAIR RESPICLICK) 108 (90 Base) MCG/ACT inhaler Inhale 2 puffs into the lungs every 6 hours as needed for shortness of breath / dyspnea or wheezing 1 Inhaler 11     atropine 1 % ophthalmic solution Place 1-2 drops under the tongue every 2 hours as needed       bisacodyl (DULCOLAX) 10 MG suppository Place 10 mg rectally daily as needed for constipation       Emollient (MOISTURIZING LOTION EX) Externally apply topically 2 times daily Bilateral lower extremeties       haloperidol (HALDOL) 0.5 MG tablet Take 1 tablet (0.5 mg) by mouth every 6 hours as needed for agitation       hypromellose-dextran (GENTEAL TEARS) 0.1-0.3 % ophthalmic solution Place 1 drop into both eyes every 6 hours as needed for dry eyes       Menthol, Topical Analgesic, (BIOFREEZE) 4 % GEL Externally apply 1 Application topically At Bedtime To left shoulder and right hip and TID PRN       nystatin (MYCOSTATIN) 745910 UNIT/GM external powder Apply topically 3 times daily 1 Bottle 97     ondansetron (ZOFRAN) 4 MG tablet TAKE 1 TABLET BY MOUTH EVERY 6 HOURS AS NEEDED 30 tablet 97         REVIEW OF SYSTEMS:  4 point ROS including Respiratory, CV, GI and , other than that noted in the HPI,  is negative    Objective:  BP (!) 163/100   Pulse 68   Temp 97.1  F (36.2  C)   Resp 20   Ht 1.6 m (5' 3\")   Wt 78.4 kg (172 lb 14.4 oz)   SpO2 (!) 89%   BMI 30.63 kg/m    Exam:  GENERAL APPEARANCE:  Alert, appears ill  RESP:  no respiratory distress, diminished breath sounds bilat bases  CV:  peripheral edema 3+ in BLE, tachycardic irregularly irreguler P 130's  NEURO:   no verbal response, no facial droop  PSYCH:  flat affect    Labs:   Recent labs in [facility] reviewed by me today.     ASSESSMENT/PLAN:  (J44.9) Chronic obstructive pulmonary disease, unspecified COPD type (H)  (primary encounter diagnosis)  (I50.9) Congestive heart failure, " unspecified HF chronicity, unspecified heart failure type (H)  (Z51.5) Hospice care patient  (G89.4) Pain syndrome, chronic  (M51.36) DDD (degenerative disc disease), lumbar  Comment: Suspect patient is transitioning, likely will not survive the weekend. Hospice is following - appreciate their assistance. Pain appears controlled. Discussed with nursing staff, hospice team.   Plan: Continue current hospice cares, continue dilaudid, ativan, haldol as ordered, continue to monitor and adjust.       (I48.0) Paroxysmal atrial fibrillation (H)  Comment: Given elevated HR, suspect she has converted in Afib with RVR, unable to take oral medication, and given goals of care, will treat symptomatically.   Plan: Continue ativan BID in addition to PRN, continue to monitor.       Orders written by provider at facility  NNO - hospice follwoing      Electronically signed by:  MARY Gómez CNP                 Sincerely,        MARY Gómez CNP

## 2020-10-30 NOTE — PROGRESS NOTES
[place] [facility]  [place] [facility] Record Number:  [id number]  Place of Service where encounter took place:  [facility] ([facility]) [[id number]]  Chief Complaint   Patient presents with     Nursing Home Acute       HPI:    Jazmin Clifton  is a 87 year old (12/30/1932), who is being seen today for an episodic care visit.  HPI information obtained from: facility chart records, facility staff, patient report and [facility] chart review. Today's concern is:     Chronic obstructive pulmonary disease, unspecified COPD type (H)  Congestive heart failure, unspecified HF chronicity, unspecified heart failure type (H)  Hospice care patient  Paroxysmal atrial fibrillation (H)  Pain syndrome, chronic  Chronic pain syndrome   Patient on hospice, recently transferred over to TCU from [facility] as [facility] staff no longer felt they could safely care for her. She is seen today in room, she is minimally responsive, but does make eye contact. Nursing staff report some moaning with position change. She has been tachycardic since shortly after TCU admission, does have a history of afib, has not been able to take oral medications. Family has been visiting frequently this AM>     Past Medical and Surgical History reviewed in Epic today.    MEDICATIONS:    Current Outpatient Medications   Medication Sig Dispense Refill     acetaminophen (TYLENOL) 650 MG suppository Place 1 suppository (650 mg) rectally every 4 hours as needed for fever       HYDROmorphone (DILAUDID) 2 MG tablet Take 0.5 tablets (1 mg) by mouth every 4 hours as needed for severe pain TAKE 1 TABLET BY MOUTH TWICE DAILY AS NEEDED FOR SEVERE PAIN 12 tablet 0     HYDROmorphone, STANDARD CONC, (DILAUDID) 1 MG/ML oral solution Take 1 mL (1 mg) by mouth every 2 hours as needed for severe pain  0     LORazepam (ATIVAN) 2 MG/ML (HIGH CONC) solution Take 0.25 mLs (0.5 mg) by mouth 2 times daily. May also take 0.25 mLs (0.5 mg) every 4 hours as needed (terminal  "agitation).       albuterol (PROAIR RESPICLICK) 108 (90 Base) MCG/ACT inhaler Inhale 2 puffs into the lungs every 6 hours as needed for shortness of breath / dyspnea or wheezing 1 Inhaler 11     atropine 1 % ophthalmic solution Place 1-2 drops under the tongue every 2 hours as needed       bisacodyl (DULCOLAX) 10 MG suppository Place 10 mg rectally daily as needed for constipation       Emollient (MOISTURIZING LOTION EX) Externally apply topically 2 times daily Bilateral lower extremeties       haloperidol (HALDOL) 0.5 MG tablet Take 1 tablet (0.5 mg) by mouth every 6 hours as needed for agitation       hypromellose-dextran (GENTEAL TEARS) 0.1-0.3 % ophthalmic solution Place 1 drop into both eyes every 6 hours as needed for dry eyes       Menthol, Topical Analgesic, (BIOFREEZE) 4 % GEL Externally apply 1 Application topically At Bedtime To left shoulder and right hip and TID PRN       nystatin (MYCOSTATIN) 884555 UNIT/GM external powder Apply topically 3 times daily 1 Bottle 97     ondansetron (ZOFRAN) 4 MG tablet TAKE 1 TABLET BY MOUTH EVERY 6 HOURS AS NEEDED 30 tablet 97         REVIEW OF SYSTEMS:  4 point ROS including Respiratory, CV, GI and , other than that noted in the HPI,  is negative    Objective:  BP (!) 163/100   Pulse 68   Temp 97.1  F (36.2  C)   Resp 20   Ht 1.6 m (5' 3\")   Wt 78.4 kg (172 lb 14.4 oz)   SpO2 (!) 89%   BMI 30.63 kg/m    Exam:  GENERAL APPEARANCE:  Alert, appears ill  RESP:  no respiratory distress, diminished breath sounds bilat bases  CV:  peripheral edema 3+ in BLE, tachycardic irregularly irreguler P 130's  NEURO:   no verbal response, no facial droop  PSYCH:  flat affect    Labs:   Recent labs in [facility] reviewed by me today.     ASSESSMENT/PLAN:  (J44.9) Chronic obstructive pulmonary disease, unspecified COPD type (H)  (primary encounter diagnosis)  (I50.9) Congestive heart failure, unspecified HF chronicity, unspecified heart failure type (H)  (Z51.5) Hospice care " patient  (G89.4) Pain syndrome, chronic  (M51.36) DDD (degenerative disc disease), lumbar  Comment: Suspect patient is transitioning, likely will not survive the weekend. Hospice is following - appreciate their assistance. Pain appears controlled. Discussed with nursing staff, hospice team.   Plan: Continue current hospice cares, continue dilaudid, ativan, haldol as ordered, continue to monitor and adjust.       (I48.0) Paroxysmal atrial fibrillation (H)  Comment: Given elevated HR, suspect she has converted in Afib with RVR, unable to take oral medication, and given goals of care, will treat symptomatically.   Plan: Continue ativan BID in addition to PRN, continue to monitor.       Orders written by provider at facility  NNO - hospice follwoing      Electronically signed by:  MARY Gómez CNP

## 2020-10-31 NOTE — PROGRESS NOTES
Notifying care team that Jazmin passed away 10/31/20 at 0817 on hospice with  present.  Peaceful death reported.

## 2020-11-02 ENCOUNTER — PATIENT OUTREACH (OUTPATIENT)
Dept: GERIATRIC MEDICINE | Facility: CLINIC | Age: 85
End: 2020-11-02

## 2020-11-02 NOTE — PROGRESS NOTES
[facility] Care Coordination Contact    Notified by AL provider that member passed away on 10/31/20 at [facility].    PCP notified. Called all providers to cancel services (APA).    Chart reviewed, notes printed and this CC's encounter closed. Chart handed off to CMS to process disenrollment tasks.    Amber Laughlin RN  [facility]  [phone]

## 2021-02-08 NOTE — PLAN OF CARE
Problem: Stroke (Ischemic) (Adult)  Goal: Signs and Symptoms of Listed Potential Problems Will be Absent or Manageable (Stroke)  Signs and symptoms of listed potential problems will be absent or manageable by discharge/transition of care (reference Stroke (Ischemic) (Adult) CPG).   Outcome: Completed Date Met:  07/28/17  Patient has not had any s/s of CVA. Will discontinue nursing concern at this time.       No

## 2021-05-27 NOTE — TELEPHONE ENCOUNTER
COPD educator note    Talked with Jazmin today. She states she is doing really well. Her breathing is good. Pt had no questions at this time. We will not be calling again, pt has been out of the hospital for her COPD for 1 year.    Natividad Mccann, MYRANDAT

## 2021-05-29 ENCOUNTER — RECORDS - HEALTHEAST (OUTPATIENT)
Dept: ADMINISTRATIVE | Facility: CLINIC | Age: 86
End: 2021-05-29

## 2021-05-31 ENCOUNTER — RECORDS - HEALTHEAST (OUTPATIENT)
Dept: ADMINISTRATIVE | Facility: CLINIC | Age: 86
End: 2021-05-31

## 2021-06-01 VITALS — BODY MASS INDEX: 36.5 KG/M2 | HEIGHT: 63 IN | WEIGHT: 206 LBS

## 2021-06-01 VITALS — BODY MASS INDEX: 35.61 KG/M2 | HEIGHT: 63 IN | WEIGHT: 201 LBS

## 2021-06-01 VITALS — WEIGHT: 202 LBS | HEIGHT: 63 IN | BODY MASS INDEX: 35.79 KG/M2

## 2021-06-02 VITALS — WEIGHT: 195 LBS | HEIGHT: 63 IN | BODY MASS INDEX: 34.55 KG/M2

## 2021-06-02 VITALS — WEIGHT: 192 LBS | BODY MASS INDEX: 34.02 KG/M2 | HEIGHT: 63 IN

## 2021-06-02 VITALS — HEIGHT: 63 IN | WEIGHT: 195.6 LBS | BODY MASS INDEX: 34.66 KG/M2

## 2021-06-02 VITALS — BODY MASS INDEX: 34.55 KG/M2 | HEIGHT: 63 IN | WEIGHT: 195 LBS

## 2021-06-02 VITALS — WEIGHT: 190 LBS | HEIGHT: 63 IN | BODY MASS INDEX: 33.66 KG/M2

## 2021-06-02 VITALS — WEIGHT: 195 LBS | BODY MASS INDEX: 34.55 KG/M2 | HEIGHT: 63 IN

## 2021-06-17 NOTE — ANESTHESIA POSTPROCEDURE EVALUATION
Patient: Jazmin Clifton  EXCISION OF RIGHT GROIN MASS  Anesthesia type: MAC    Patient location: PACU  Last vitals:   Vitals:    05/03/18 1300   BP:    Pulse: 65   Resp:    Temp:    SpO2: 91%     Post vital signs: stable  Level of consciousness: awake and responds to simple questions  Post-anesthesia pain: pain controlled  Post-anesthesia nausea and vomiting: no  Pulmonary: unassisted, return to baseline  Cardiovascular: stable and blood pressure at baseline  Hydration: adequate  Anesthetic events: no    QCDR Measures:  ASA# 11 - Sonia-op Cardiac Arrest: ASA11B - Patient did NOT experience unanticipated cardiac arrest  ASA# 12 - Sonia-op Mortality Rate: ASA12B - Patient did NOT die  ASA# 13 - PACU Re-Intubation Rate: ASA13B - Patient did NOT require a new airway mgmt  ASA# 10 - Composite Anes Safety: ASA10A - No serious adverse event    Additional Notes:

## 2021-06-17 NOTE — ANESTHESIA PREPROCEDURE EVALUATION
Anesthesia Evaluation      Patient summary reviewed     Airway   Mallampati: II   Pulmonary - normal exam    breath sounds clear to auscultation                         Cardiovascular   Rhythm: regular  Rate: normal,         Neuro/Psych      Endo/Other    (+) obesity,      GI/Hepatic/Renal       Other findings: Carotid stenosis, weakness, old inf MI on EKG...      Dental - normal exam                        Anesthesia Plan  Planned anesthetic: MAC    ASA 3   Induction: intravenous   Anesthetic plan and risks discussed with: patient    Post-op plan: routine recovery

## 2021-06-17 NOTE — PROGRESS NOTES
[facility]    HPI:    85 y.o. year old female who I have been consulted by Roby Willis MD for evaluation of Mass (Fluid collection right groin)    Mass of right groin. Question lymph node versus lymphocele from angiogram. Painful most of the time. Severe CAD and pulm disease. Unable to walk far secondary to leg pain and breathing issues.   Here for evaluation.      Allergies:Augmentin [amoxicillin-pot clavulanate]; Levofloxacin; Macrobid [nitrofurantoin monohyd/m-cryst]; Morphine; Norvasc [amlodipine]; and Quinapril    Past Medical History:   Diagnosis Date     Arthritis      Brain aneurysm      CHF (congestive heart failure)      Chronic kidney disease      COPD (chronic obstructive pulmonary disease)      Hypertension      S/P bladder repair      Stroke     x2       Past Surgical History:   Procedure Laterality Date     CAROTID ENDARTERECTOMY       CEREBRAL ANEURYSM REPAIR       CHOLECYSTECTOMY       HYSTERECTOMY         CURRENT MEDS:  Current Outpatient Prescriptions on File Prior to Visit   Medication Sig Dispense Refill     acetaminophen (TYLENOL) 500 MG tablet Take 1,000 mg by mouth 3 (three) times a day.       aspirin 81 mg chewable tablet Chew 81 mg daily.       atorvastatin (LIPITOR) 40 MG tablet Take 40 mg by mouth at bedtime.       bisacodyl (DULCOLAX) 10 mg suppository Insert 10 mg into the rectum daily as needed.       calcium-vitamin D 500 mg(1,250mg) -200 unit per tablet Take 1 tablet by mouth daily.       citalopram (CELEXA) 10 MG tablet Take 10 mg by mouth daily.       cyanocobalamin 1000 MCG tablet Take 1,000 mcg by mouth daily.       diphenhydrAMINE (BENADRYL) 2 % cream Apply topically 3 (three) times a day as needed for itching.       docusate sodium (COLACE) 100 MG capsule Take 100 mg by mouth daily.       estradiol (ESTRACE) 0.01 % (0.1 mg/gram) vaginal cream Insert 1 g into the vagina 3 (three) times a week. Apply a pea size amount vaginally at bedtime 3x weekly       famotidine  (PEPCID) 20 MG tablet Take 1 tablet (20 mg total) by mouth 2 (two) times a day. 30 tablet 0     fluticasone (FLONASE) 50 mcg/actuation nasal spray Apply 1 spray into each nostril 2 (two) times a day.       fluticasone-vilanterol (BREO ELLIPTA) 100-25 mcg/dose DsDv inhaler Inhale 1 puff daily.       furosemide (LASIX) 40 MG tablet Take 40 mg by mouth daily.       gabapentin (NEURONTIN) 300 MG capsule Take 300 mg by mouth 2 (two) times a day.       guaiFENesin (ROBITUSSIN) 100 mg/5 mL syrup Take 200-400 mg by mouth every 4 (four) hours as needed for cough.       ipratropium-albuterol (DUO-NEB) 0.5-2.5 mg/3 mL nebulizer Inhale 3 mL 4 (four) times a day as needed.       isosorbide mononitrate (IMDUR) 30 MG 24 hr tablet Take 30 mg by mouth daily.       isradipine (DYNACIRC) 5 MG capsule Take 5 mg by mouth 2 (two) times a day.       Lactobacillus rhamnosus GG (CULTURELLE) 10-15 Billion cell capsule Take 1 capsule by mouth 2 (two) times a day.       LIDOCAINE-METHYL SAL-MENTHOL TOP Apply topically 3 (three) times a day as needed (hip pain).       lisinopril (PRINIVIL,ZESTRIL) 30 MG tablet Take 30 mg by mouth daily.       magnesium oxide 250 mg Tab Take 250 mg by mouth daily.       memantine (NAMENDA) 5 MG tablet Take 5 mg by mouth daily.       menthol-zinc oxide (CALMOSEPTINE) 0.44-20.6 % Oint ointment Apply topically 2 (two) times a day as needed (vaginal burning).       nystatin (MYCOSTATIN) powder Apply topically 2 (two) times a day as needed.       nystatin (MYCOSTATIN) powder Apply 1 application topically 2 (two) times a day.       oxyCODONE (ROXICODONE) 5 MG immediate release tablet Take 2.5-5 mg by mouth every 3 (three) hours as needed for pain (2.5mg for pain 1-5 and 5mg for pain 6-10).       pantoprazole (PROTONIX) 40 MG tablet Take 40 mg by mouth daily before breakfast.       phenol-phenolate sodium (PHENOL-PHENOLATE) 1.4 % SprA Apply 2 sprays to the mouth or throat every 2 (two) hours as needed (sore throat).    "    polyvinyl alcohol (LIQUIFILM TEARS) 1.4 % ophthalmic solution Administer 1 drop to both eyes 2 (two) times a day.       polyvinyl alcohol (LIQUIFILM TEARS) 1.4 % ophthalmic solution Administer 1 drop to both eyes 4 (four) times a day as needed for dry eyes.       umeclidinium (INCRUSE ELLIPTA) 62.5 mcg/actuation DsDv inhaler Inhale 1 puff daily.       No current facility-administered medications on file prior to visit.        Family History   Problem Relation Age of Onset     No Medical Problems Mother      No Medical Problems Father      Heart disease Brother         reports that she quit smoking about 26 years ago. She has a 30.00 pack-year smoking history. She has never used smokeless tobacco. She reports that she does not drink alcohol or use illicit drugs.    Review of Systems:  Negative except back pain, breathing issues, groin pain. Otherwise twelve system of review is negative.      OBJECTIVE:  Vitals:    04/30/18 1026   BP: 131/61   Patient Site: Right Arm   Patient Position: Sitting   Cuff Size: Adult Regular   Pulse: 63   SpO2: 94%   Weight: 201 lb (91.2 kg)   Height: 5' 3\" (1.6 m)     Body mass index is 35.61 kg/(m^2).    EXAM:  GENERAL: This is a well-developed 85 y.o. female who appears her stated age  HEAD: normocephalic  HEENT: Pupils equal and reactive bilaterally  MOUTH: mucus membranes intact  CARDIAC: RRR without murmur  CHEST/LUNG:  Clear to auscultation  ABDOMEN: Soft, nontender, nondistended, no masses, ervin mass right 3 cm painful.  EXTREMITIES: Grossly normal, warm,   NEUROLOGIC: Focally intact, nonfocal  INTEGUMENT: No open lesions or ulcers  VASCULAR: Pulses intact, symmetrical upper and lower extremities.        LABS:  Lab Results   Component Value Date    WBC 4.6 03/30/2018    HGB 9.7 (L) 03/30/2018    HCT 31.8 (L) 03/30/2018    MCV 85 03/30/2018     03/31/2018     INR/Prothrombin Time      No results found for: HGBA1C  Lab Results   Component Value Date    ALT 11 03/29/2018 "    AST 16 03/29/2018    ALKPHOS 78 03/29/2018    BILITOT 0.6 03/29/2018        Images: reviewed old ct of abodmen, mass was present    Assessment/Plan:   Right groin mass  Excision under local mac anesthesia in a hospital secondary to pulmonary risks with anything  Risks of infection, recurrence and blood loss were discussed.  Answered questions    [place] schedule but need primary clearance     Mejia Hurtado MD  Health system Department of Surgery

## 2021-06-17 NOTE — ANESTHESIA CARE TRANSFER NOTE
Last vitals:   Vitals:    05/03/18 1105   BP: 132/62   Pulse: (!) 57   Resp: 16   Temp: 36.7  C (98  F)   SpO2: 100%     Patient's level of consciousness is drowsy  Spontaneous respirations: yes  Maintains airway independently: yes  Dentition unchanged: yes  Oropharynx: oropharynx clear of all foreign objects    QCDR Measures:  ASA# 20 - Surgical Safety Checklist: WHO surgical safety checklist completed prior to induction  PQRS# 430 - Adult PONV Prevention: 4558F - Pt received => 2 anti-emetic agents (different classes) preop & intraop  ASA# 8 - Peds PONV Prevention: NA - Not pediatric patient, not GA or 2 or more risk factors NOT present  PQRS# 424 - Sonia-op Temp Management: 4559F - At least one body temp DOCUMENTED => 35.5C or 95.9F within required timeframe  PQRS# 426 - PACU Transfer Protocol: - Transfer of care checklist used  ASA# 14 - Acute Post-op Pain: ASA14B - Patient did NOT experience pain >= 7 out of 10

## 2021-06-18 NOTE — PROGRESS NOTES
[facility] Surgery Follow up    HPI:    85 y.o. year old female who returns for a follow up. Left groin mass excision.    Allergies:Augmentin [amoxicillin-pot clavulanate]; Levofloxacin; Macrobid [nitrofurantoin monohyd/m-cryst]; Morphine; Norvasc [amlodipine]; and Quinapril    Past Medical History:   Diagnosis Date     Arthritis      Brain aneurysm      CHF (congestive heart failure)      Chronic kidney disease      COPD (chronic obstructive pulmonary disease)      Dementia     Mild     Depression      GERD (gastroesophageal reflux disease)      History of anesthesia complications     Delirium related to anesthesia and narcotic medication     History of transfusion      Hyperlipemia      Hypertension      Stroke 2017    No deficits       Past Surgical History:   Procedure Laterality Date     BACK SURGERY      Lumbar Laminectomy x3     BLADDER REPAIR       CAROTID ENDARTERECTOMY Right      CEREBRAL ANEURYSM REPAIR       CHOLECYSTECTOMY       HYSTERECTOMY       JOINT REPLACEMENT Left     SATHYA       CURRENT MEDS:  Current Outpatient Prescriptions on File Prior to Visit   Medication Sig Dispense Refill     acetaminophen (TYLENOL) 500 MG tablet Take 1,000 mg by mouth 3 (three) times a day.       aspirin 81 mg chewable tablet Chew 81 mg daily.       atorvastatin (LIPITOR) 40 MG tablet Take 40 mg by mouth at bedtime.       bisacodyl (DULCOLAX) 10 mg suppository Insert 10 mg into the rectum daily as needed.       calcium-vitamin D 500 mg(1,250mg) -200 unit per tablet Take 1 tablet by mouth daily.       citalopram (CELEXA) 10 MG tablet Take 10 mg by mouth daily.       cyanocobalamin 1000 MCG tablet Take 1,000 mcg by mouth daily.       diphenhydrAMINE (BENADRYL) spray Apply topically 3 (three) times a day as needed for itching.       docusate sodium (COLACE) 100 MG capsule Take 100 mg by mouth daily.       estradiol (ESTRACE) 0.01 % (0.1 mg/gram) vaginal cream Insert 1 g into the vagina 3 (three) times a week. Apply a pea  size amount vaginally at bedtime 3x weekly       famotidine (PEPCID) 20 MG tablet Take 1 tablet (20 mg total) by mouth 2 (two) times a day. 30 tablet 0     fluticasone (FLONASE) 50 mcg/actuation nasal spray Apply 1 spray into each nostril 2 (two) times a day.       fluticasone-vilanterol (BREO ELLIPTA) 100-25 mcg/dose DsDv inhaler Inhale 1 puff daily.       furosemide (LASIX) 40 MG tablet Take 40 mg by mouth daily.       gabapentin (NEURONTIN) 300 MG capsule Take 300 mg by mouth 2 (two) times a day.       guaiFENesin (ROBITUSSIN) 100 mg/5 mL syrup Take 100-200 mg by mouth every 4 (four) hours as needed for cough.        HYDROcodone-acetaminophen 5-325 mg per tablet Take 1-2 tablets by mouth every 4 (four) hours as needed for pain. 25 tablet 0     isosorbide mononitrate (IMDUR) 30 MG 24 hr tablet Take 30 mg by mouth daily.       isradipine (DYNACIRC) 5 MG capsule Take 5 mg by mouth 2 (two) times a day.       Lactobacillus rhamnosus GG (CULTURELLE) 10-15 Billion cell capsule Take 1 capsule by mouth 2 (two) times a day.       levalbuterol (XOPENEX) 1.25 mg/3 mL nebulizer solution Take 1 ampule by nebulization every 4 (four) hours as needed for wheezing.       LIDOCAINE-METHYL SAL-MENTHOL TOP Apply topically 3 (three) times a day as needed (hip pain).       lisinopril (PRINIVIL,ZESTRIL) 30 MG tablet Take 30 mg by mouth daily.       magnesium oxide 250 mg Tab Take 250 mg by mouth daily.       memantine (NAMENDA) 5 MG tablet Take 5 mg by mouth daily.       menthol-zinc oxide (CALMOSEPTINE) 0.44-20.6 % Oint ointment Apply topically 2 (two) times a day as needed (vaginal burning).       nystatin (MYCOSTATIN) powder Apply topically 2 (two) times a day as needed.       nystatin (MYCOSTATIN) powder Apply 1 application topically 2 (two) times a day.       oxyCODONE (ROXICODONE) 5 MG immediate release tablet Take 2.5-5 mg by mouth every 3 (three) hours as needed for pain (2.5mg for mild to moderate pain, 5mg for severe pain).         pantoprazole (PROTONIX) 40 MG tablet Take 40 mg by mouth daily before breakfast.       phenol-phenolate sodium (PHENOL-PHENOLATE) 1.4 % SprA Apply 2 sprays to the mouth or throat every 2 (two) hours as needed (sore throat).       polyvinyl alcohol (LIQUIFILM TEARS) 1.4 % ophthalmic solution Administer 1 drop to both eyes 2 (two) times a day.       polyvinyl alcohol (LIQUIFILM TEARS) 1.4 % ophthalmic solution Administer 1 drop to both eyes 4 (four) times a day as needed for dry eyes.       simethicone (MYLICON) 80 MG chewable tablet Chew 160 mg 4 (four) times a day as needed for flatulence.       umeclidinium (INCRUSE ELLIPTA) 62.5 mcg/actuation DsDv inhaler Inhale 1 puff daily.       No current facility-administered medications on file prior to visit.        Family History   Problem Relation Age of Onset     No Medical Problems Mother      No Medical Problems Father      Heart disease Brother         reports that she quit smoking about 26 years ago. She has a 30.00 pack-year smoking history. She has never used smokeless tobacco. She reports that she drinks alcohol. She reports that she does not use illicit drugs.    Review of Systems:  Negative except swelling of left leg    OBJECTIVE:  Vitals:    05/18/18 1345   BP: 145/68   Patient Site: Right Arm   Patient Position: Sitting   Cuff Size: Adult Large   Pulse: (!) 59   SpO2: 96%     There is no height or weight on file to calculate BMI.    EXAM:  GENERAL: This is a well-developed 85 y.o. female who appears her stated age  HEAD: normocephalic  HEENT: Pupils equal and reactive bilaterally  CARDIAC: RRR without murmur  CHEST/LUNG:  Clear to auscultation  ABDOMEN: Soft, nontender, nondistended, no masses , groin incision ok no drainage.  NEUROLOGIC: Focally intact, nonfocal  VASCULAR: Pulses intact, symmetrical upper and lower extremities.  EXT severe edema both legs right greater then left.        LABS:  Lab Results   Component Value Date    WBC 5.5 05/02/2018     HGB 9.2 (L) 05/02/2018    HCT 31.3 (L) 05/02/2018    MCV 88 05/02/2018     05/02/2018     INR/Prothrombin Time      No results found for: HGBA1C  Lab Results   Component Value Date    ALT 11 03/29/2018    AST 16 03/29/2018    ALKPHOS 78 03/29/2018    BILITOT 0.6 03/29/2018          Assessment/Plan:   1. Right leg swelling  Wrapped today  Instructed on wrap   Follow up I week lymphedema care     - Ambulatory referral to Lymphedema Care  - Change dressing     Mejia Hurtado MD  [facility] Department of Surgery

## 2021-06-18 NOTE — LETTER
Letter by Uzma Morris MD at      Author: Uzma Morris MD Service: -- Author Type: --    Filed:  Encounter Date: 1/3/2019 Status: (Other)       Dear Ms. Clifton,    This letter will help in preparation for your upcoming surgery. Please contact us with any additional questions you may have regarding your surgery. Contact information for your surgery scheduler:   Jay Franklin, and Sierra: [phone] ~ Deshawn Peter and Chasity: [phone] ~ Jono    You are scheduled for: Lumbar 1 - Lumbar 2 Bilateral Laminectomy and Medial Facetectomy.  With: Dr. Uzma Morris  Date/Time: Wednesday, January 9, 2019 at 1:20 pm  (time subject to change)  Location: [place] [address]    Check in at the Welcome Desk inside the main doors of the [facility]. An escort will take you to the surgery waiting area. A nurse from [facility] (surgery admit unit) at the [facility] will call you with your exact arrival time to the hospital - typically one-and-a-half to two-and-a-half hours prior to your scheduled surgery time.     In the event of an emergency surgery case, there may be an adjustment to your start time for surgery.     PREPARING FOR YOUR SURGERY    *Pre-op Physical: Done at [facility] [place].      *Please discuss the necessity of receiving a pneumococcal vaccine prior to surgery at your pre-op physical. Recommended for all patients over the age of 65, or based on certain medical conditions.     *After the pre-op physical is complete, please have your clinic fax the visit note to your surgery scheduler at [phone].    *Pre-op Lab Work: Determined by your primary care physician.    *Readiness Visit: Dr. Morris's nurse will call you prior to your procedure to go over your pre-op physical results, give pre-surgery instructions, and answer any additional questions about the procedure or the recovery.    *Bring your images on disk or film to your Readiness Visit so we  can have them loaded into our system and available for your surgery, if they have not previously been brought to our office. Failure to bring your images to our office will result in cancellation of your surgery.     *Ensure that you have completed your pre-op physical, along with any other necessary tests/appointments (listed above), prior to your Readiness Visit.           ADDITIONAL INFORMATION REGARDING YOUR SURGERY    Medications    Bring a list ALL of your medications, including any over-the-counter vitamins and herbal supplements to your Readiness Visit, and on the day of surgery.    DO NOT bring your medications with you the day of surgery.    Please see attached third sheet for more details on medications/vitamins/herbal supplements that should be discontinued prior to your surgery date.     If you are unsure if you should discontinue a medication/ vitamin/herbal supplement, please call our office and discuss with a nurse.    Continue taking your medications/vitamins/herbal supplements unless they are on the attached list.     Failure to follow the instructions regarding medications/vitamins/herbal supplements will result in cancellation of your surgery.    Day BEFORE Surgery    DO NOT shave near your surgical site. This can cause irritation of the skin    Using a washcloth and provided bottle of Hibiclens, shower the night BEFORE surgery, using a half bottle of Hibiclens to wash your body, avoiding face and genitals. The morning OF surgery, shower and use the second half of the bottle to wash your body, avoiding face and genitals. If you are unable to take a shower the morning of surgery, please discuss your options with the nurse at your readiness visit.     NOTHING  to eat after 11:00 p.m. the night prior to your procedure    CLEAR LIQUIDS: May have the following liquids up to two (2) hours before your arrival time at the hospital: water, plain black coffee (no cream or milk), plain black tea or plain  green tea (no cream or milk), Gatorade or Propel Water.    SMOKING: Stop smoking as far before surgery as possible, or as directed by your surgeon. NO tobacco products of any kind (cigarettes, e-cigarettes, chewing tobacco) beginning at 11:00 p.m. the night prior to your procedure.     ALCOHOL: You should stop drinking alcohol beginning at 11:00 p.m. the night prior to your procedure    Contact our office if you have symptoms of illness such as a fever of 101 or greater, chills, cough, sore throat, or if you develop a rash or any open sore    Day OF Surgery    If youve been instructed to take a medication(s) on the morning of surgery, please take with a very small sip of water.    Wear loose & comfortable clothing and flat shoes, Leave jewelry/valuables at home. If you wear contact lenses, remove them at home and wear glasses. Remove any body piercings. Remove nail polish.     Planning for Discharge    Start planning for your care after discharge as soon as you receive this letter.    If you have not made arrangements to have someone take you home and stay with you for the first 48 hours after discharge, your surgery will be cancelled.      PRE-OPERATIVE MEDICATION INSTRUCTIONS  Review this information with your primary care physician prior to discontinuing any of the medications listed below.  Notify your primary care physician that you have been instructed to discontinue these medications.    TEN (10) Days Prior to Surgery, STOP the Following Medications   Felicia-Warnock  Anacin  Aspirin  Excedrin  Pepto Bismol    **Before taking ANY over-the-counter medications, check the label for Aspirin   Non-steroidal   Anti-inflammatory Medications (NSAIDS)    Celebrex  Diclofenac (Cataflam)  Etodolac (Iodine)  Fenoprofen (Nalfon)  Ibuprofen (Advil, Motrin, Nuprin)  Indomethacin (Indocin)  Ketoprofen  Ketorolac (Toradol)  Melaxicam (Mobic)  Naproxen (AnaProx, Aleve, Naprosyn)  Relafen (Nabumetone)   Herbal Supplements (this is  a partial list of herbals to be discontinued)    [place]  Ephedra  Feverfew  Fish Oil  Flaxseed Oil  Garlic  Vivian  Gingko  Ginseng  Goldenseal  Imitrex (Sumatriptan)  Kava  Krill Oil  Licorice  Multi Vitamins  [facility]  Valerian  Vitamin E  Yohimbe   CHECK WITH YOUR PRESCRIBING DOCTOR BEFORE STOPPING ANY BLOOD THINNERS (approximately 7 days prior to surgery)  (Coumadin, Plavix, Platel, Aggrenox, Effient (Prasugrel), Ticlid), Xarelto, and Pradaxa      ALWAYS CHECK WITH YOUR PRESCRIBING DOCTOR REGARDING THE MEDICATIONS LISTED BELOW; RECOMMENDED STOP TIME IS ALSO LISTED      If you are taking Lovenox, discontinue 24 HOURS prior to surgery    If you are taking weight loss medication, discontinue 7 days prior to surgery    If you are taking Metformin or Simvastatin, check with your primary care physician (or whoever has prescribed you this medication) regarding when to discontinue prior to surgery       [employer] and traditional parking is located at [facility] at [address] [place] [address]. Validation is done at the Welcome Desk in the [facility].

## 2021-06-20 NOTE — PROGRESS NOTES
[facility] Surgery Follow up    HPI:    85 y.o. year old female who returns for a follow up.     Allergies:Augmentin [amoxicillin-pot clavulanate]; Levofloxacin; Macrobid [nitrofurantoin monohyd/m-cryst]; Morphine; Norvasc [amlodipine]; and Quinapril    Past Medical History:   Diagnosis Date     Arthritis      Brain aneurysm      CHF (congestive heart failure) (H)      Chronic diastolic congestive heart failure (H) 2/2/2018     Chronic kidney disease      COPD (chronic obstructive pulmonary disease) (H)      COPD exacerbation (H) 3/29/2018     Dementia     Mild     Depression      Essential hypertension 2/2/2018     GERD (gastroesophageal reflux disease)      History of anesthesia complications     Delirium related to anesthesia and narcotic medication     History of stroke 2/2/2018     History of transfusion      Hyperlipemia      Hypertension      Multiple closed fractures of metatarsal bone 5/28/2018     Multiple closed fractures of metatarsal bone of left foot 5/28/2018     Near syncope 2/2/2018     Normocytic anemia 3/29/2018     Osteoporosis 5/29/2018     Right groin mass 5/3/2018     Stage 3 chronic kidney disease 3/29/2018     Stenosis of right carotid artery 2/2/2018     Stroke (H) 2017    No deficits     Urinary tract infection without hematuria, site unspecified      UTI (urinary tract infection) 3/31/2018     Weakness 2/3/2018       Past Surgical History:   Procedure Laterality Date     BACK SURGERY      Lumbar Laminectomy x3     BLADDER REPAIR       CAROTID ENDARTERECTOMY Right      CEREBRAL ANEURYSM REPAIR       CHOLECYSTECTOMY       HYSTERECTOMY       JOINT REPLACEMENT Left     SATHYA       CURRENT MEDS:  Current Outpatient Prescriptions on File Prior to Visit   Medication Sig Dispense Refill     acetaminophen (TYLENOL) 500 MG tablet Take 1,000 mg by mouth 3 (three) times a day. Max 4 gm APAP/24 hours       alendronate (FOSAMAX) 70 MG tablet Take 1 tablet (70 mg total) by mouth every 7 days. Take in the  morning on an empty stomach with a full glass of water 30 minutes before food 10 tablet 0     aspirin 81 mg chewable tablet Chew 81 mg daily.       atorvastatin (LIPITOR) 40 MG tablet Take 40 mg by mouth at bedtime.       bisacodyl (DULCOLAX) 10 mg suppository Insert 10 mg into the rectum daily as needed.       calcium carbonate-vitamin D3 (CALCIUM 600 + D,3,) 600 mg(1,500mg) -200 unit per tablet Take 1 tablet by mouth daily.       citalopram (CELEXA) 10 MG tablet Take 10 mg by mouth daily.       cyanocobalamin 1000 MCG tablet Take 1,000 mcg by mouth daily.       diphenhydrAMINE (BENADRYL) spray Apply topically 3 (three) times a day as needed for itching.       docusate sodium (COLACE) 100 MG capsule Take 100 mg by mouth daily.       estradiol (ESTRACE) 0.01 % (0.1 mg/gram) vaginal cream Insert 1 g into the vagina 3 (three) times a week. Apply a pea size amount vaginally at bedtime 3x weekly       famotidine (PEPCID) 20 MG tablet Take 1 tablet (20 mg total) by mouth 2 (two) times a day. 30 tablet 0     fluticasone (FLONASE) 50 mcg/actuation nasal spray Apply 1 spray into each nostril 2 (two) times a day.       fluticasone-vilanterol (BREO ELLIPTA) 100-25 mcg/dose DsDv inhaler Inhale 1 puff daily.       furosemide (LASIX) 40 MG tablet Take 40 mg by mouth daily.       gabapentin (NEURONTIN) 300 MG capsule Take 300 mg by mouth 2 (two) times a day.       guaiFENesin (ROBITUSSIN) 100 mg/5 mL syrup Take 100-200 mg by mouth every 4 (four) hours as needed for cough.        HYDROcodone-acetaminophen 5-325 mg per tablet Take 1-2 tablets by mouth every 4 (four) hours as needed for pain. 25 tablet 0     isosorbide mononitrate (IMDUR) 30 MG 24 hr tablet Take 30 mg by mouth daily.       isradipine (DYNACIRC) 5 MG capsule Take 5 mg by mouth 2 (two) times a day.       Lactobacillus rhamnosus GG (CULTURELLE) 10-15 Billion cell capsule Take 1 capsule by mouth 2 (two) times a day.       levalbuterol (XOPENEX) 1.25 mg/3 mL nebulizer  solution Take 1 ampule by nebulization every 4 (four) hours as needed for wheezing.       LIDOCAINE-METHYL SAL-MENTHOL TOP Apply topically 3 (three) times a day as needed (hip pain).       lisinopril (PRINIVIL,ZESTRIL) 30 MG tablet Take 30 mg by mouth daily.       magnesium oxide 250 mg Tab Take 250 mg by mouth daily.       memantine (NAMENDA) 5 MG tablet Take 5 mg by mouth daily.       menthol-zinc oxide (CALMOSEPTINE) 0.44-20.6 % Oint ointment Apply topically 2 (two) times a day as needed (vaginal burning).       nystatin (MYCOSTATIN) powder Apply topically 2 (two) times a day as needed (redness).        nystatin (MYCOSTATIN) powder Apply 1 application topically 2 (two) times a day.       pantoprazole (PROTONIX) 40 MG tablet Take 40 mg by mouth daily before breakfast.       phenol-phenolate sodium (PHENOL-PHENOLATE) 1.4 % SprA Apply 2 sprays to the mouth or throat every 2 (two) hours as needed (sore throat).       polyvinyl alcohol (LIQUIFILM TEARS) 1.4 % ophthalmic solution Administer 1 drop to both eyes 2 (two) times a day.       polyvinyl alcohol (LIQUIFILM TEARS) 1.4 % ophthalmic solution Administer 1 drop to both eyes 4 (four) times a day as needed for dry eyes.       simethicone (MYLICON) 80 MG chewable tablet Chew 160 mg 4 (four) times a day as needed for flatulence.       umeclidinium (INCRUSE ELLIPTA) 62.5 mcg/actuation DsDv inhaler Inhale 1 puff daily.       No current facility-administered medications on file prior to visit.        Family History   Problem Relation Age of Onset     No Medical Problems Mother      No Medical Problems Father      Heart disease Brother         reports that she quit smoking about 26 years ago. She has a 30.00 pack-year smoking history. She has never used smokeless tobacco. She reports that she drinks alcohol. She reports that she does not use illicit drugs.    Review of Systems:  Negative except bilateral lower ext swelling, difficulty getting around, wears some compression  "Otherwise twelve system of review is negative.      OBJECTIVE:  Vitals:    08/24/18 1231   BP: 141/62   Patient Site: Right Arm   Patient Position: Sitting   Cuff Size: Adult Large   Pulse: 62   SpO2: 94%   Weight: 206 lb (93.4 kg)   Height: 5' 3\" (1.6 m)     Body mass index is 36.49 kg/(m^2).    EXAM:  GENERAL: This is a well-developed 85 y.o. female who appears her stated age  HEAD: normocephalic  HEENT: Pupils equal and reactive bilaterally  CARDIAC: RRR without murmur  CHEST/LUNG:  Clear to auscultation  ABDOMEN: Soft, nontender, nondistended, no masses , groin seroma resolved right  NEUROLOGIC: Focally intact, nonfocal  VASCULAR: Pulses intact, symmetrical upper and lower extremities.  EXT: significant bilateral lower ext swelling        LABS:  Lab Results   Component Value Date    WBC 5.5 05/29/2018    HGB 9.1 (L) 05/29/2018    HCT 30.0 (L) 05/29/2018    MCV 85 05/29/2018     05/29/2018     INR/Prothrombin Time      No results found for: HGBA1C  Lab Results   Component Value Date    ALT 11 03/29/2018    AST 16 03/29/2018    ALKPHOS 78 03/29/2018    BILITOT 0.6 03/29/2018            Assessment/Plan:   1. Lymphedema  Referral again to the lymphedema clinic [facility]  - Ambulatory referral to Lymphedema Care      Return referral to Deidre Montenegro.     Mejia Hurtado MD  [facility] Department of Surgery  "

## 2021-06-21 NOTE — PROGRESS NOTES
Jazmin is here to discuss lumbar surgery. She was seen in the hospital a week ago for her back. She denies any changes in symptoms.   SYED Jacques

## 2021-06-21 NOTE — PROGRESS NOTES
Jazmin is here to discuss lumbar surgery.     She was seen in the hospital, by me, a week ago for her back.   She denies any changes in symptoms.  She was rather debilitated at the time I saw her in the hospital.  She was recovering from a urinary tract infection and significant arthritic changes in the knees and broken foot bones.  Yajaira is improved and now complains of the back pain that is disabling.    I reviewed the symptoms and I may help with L1-2 decompression.  She would like to proceed with that.  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  I told her that this is not perfect, but all of the symptoms she experiences when she is lying flat or standing that requires forward bending is amenable to this intervention.  She appeared to understand the discussion and appears to have good mind function.  I will submit the orders.  She will likely need some type of home care or TCU after the surgery is completed.  I do anticipate placing a lumbar drain.  This would prevent epidural hematoma formation and paraplegia.    She appeared to understand the discussion and asked appropriate questions.  I believe I answered to her satisfaction.  The MA provided additional instruction.  Total time spent in this evaluation of personal information, coordination of care, documentation and face-to-face discussion was 15 minutes.

## 2021-06-22 NOTE — PROGRESS NOTES
[facility] Surgery Follow up    HPI:    85 y.o. year old female who returns for a follow up.  She is here for follow-up of right groin swelling.  She has a known resection of lymphatic tumor and she has had subsequent edema issues and problems and I referred her already to Dr. Danielle she did not follow-up with that consultation and is here back for the same reason of the swelling of her leg.    Allergies:Augmentin [amoxicillin-pot clavulanate]; Levofloxacin; Macrobid [nitrofurantoin monohyd/m-cryst]; Morphine; Norvasc [amlodipine]; and Quinapril    Past Medical History:   Diagnosis Date     Arthritis      Brain aneurysm      CHF (congestive heart failure) (H)      Chronic diastolic congestive heart failure (H) 2/2/2018     Chronic kidney disease      COPD (chronic obstructive pulmonary disease) (H)      COPD exacerbation (H) 3/29/2018     Dementia     Mild     Depression      Essential hypertension 2/2/2018     GERD (gastroesophageal reflux disease)      History of anesthesia complications     Delirium related to anesthesia and narcotic medication     History of stroke 2/2/2018     History of transfusion      Hyperlipemia      Hypertension      Multiple closed fractures of metatarsal bone 5/28/2018     Multiple closed fractures of metatarsal bone of left foot 5/28/2018     Near syncope 2/2/2018     Normocytic anemia 3/29/2018     Osteoporosis 5/29/2018     Right groin mass 5/3/2018     Stage 3 chronic kidney disease (H) 3/29/2018     Stenosis of right carotid artery 2/2/2018     Stroke (H) 2017    No deficits     Urinary tract infection without hematuria, site unspecified      UTI (urinary tract infection) 3/31/2018     Weakness 2/3/2018       Past Surgical History:   Procedure Laterality Date     BACK SURGERY      Lumbar Laminectomy x3     BLADDER REPAIR       CAROTID ENDARTERECTOMY Right      CEREBRAL ANEURYSM REPAIR       CHOLECYSTECTOMY       HYSTERECTOMY       JOINT REPLACEMENT Left     SATHYA       CURRENT  MEDS:  Current Outpatient Medications on File Prior to Visit   Medication Sig Dispense Refill     acetaminophen (TYLENOL) 325 MG tablet Take 1 tablet (325 mg total) by mouth every 4 (four) hours as needed for pain.  0     alendronate (FOSAMAX) 10 MG tablet Take 10 mg by mouth daily before breakfast. Take in the morning on an empty stomach with a full glass of water 30 minutes before food       ascorbic acid, vitamin C, 500 mg cap Take 500 mg by mouth daily.       aspirin 81 mg chewable tablet Chew 81 mg daily.       atorvastatin (LIPITOR) 40 MG tablet Take 40 mg by mouth at bedtime.       benzocaine-menthol (,CHLORASEPTIC,) 6-10 mg lozenge Take 1 lozenge by mouth every hour as needed for pain.       bisacodyl (DULCOLAX) 10 mg suppository Insert 10 mg into the rectum daily as needed.       calcium carbonate-vitamin D3 (CALCIUM 600 + D,3,) 600 mg(1,500mg) -200 unit per tablet Take 1 tablet by mouth daily.       calcium, as carbonate, (TUMS) 200 mg calcium (500 mg) chewable tablet Chew 1 tablet every hour as needed for heartburn.       citalopram (CELEXA) 20 MG tablet Take 20 mg by mouth daily.       cyclobenzaprine (FLEXERIL) 5 MG tablet Take 5 mg by mouth every 8 (eight) hours as needed for muscle spasms.       donepezil (ARICEPT) 5 MG tablet Take 5 mg by mouth daily.       DULoxetine (CYMBALTA) 30 MG capsule Take 30 mg by mouth daily.       estradiol (ESTRACE) 0.01 % (0.1 mg/gram) vaginal cream Insert 1 g into the vagina 3 (three) times a week. Apply a pea size amount vaginally at bedtime 3x weekly MWF       fluticasone (FLONASE) 50 mcg/actuation nasal spray Apply 1 spray into each nostril 2 (two) times a day.       fluticasone-vilanterol (BREO ELLIPTA) 100-25 mcg/dose DsDv inhaler Inhale 1 puff daily.       furosemide (LASIX) 20 MG tablet Take 20 mg by mouth daily as needed (weight gain > 2 lbs in one day).       gabapentin (NEURONTIN) 100 MG capsule Take 200 mg by mouth at bedtime.       guaiFENesin (ROBITUSSIN)  100 mg/5 mL syrup Take 100-200 mg by mouth every 4 (four) hours as needed for cough.        hydrocortisone 1 % cream Apply 1 application topically every 6 (six) hours as needed.       isosorbide mononitrate (IMDUR) 30 MG 24 hr tablet Take 30 mg by mouth daily.       isradipine (DYNACIRC) 5 MG capsule Take 5 mg by mouth 2 (two) times a day.       levalbuterol (XOPENEX) 1.25 mg/3 mL nebulizer solution Take 1 ampule by nebulization every 4 (four) hours as needed for wheezing.       LIDOCAINE-METHYL SAL-MENTHOL TOP Apply topically 3 (three) times a day as needed (hip pain).       lisinopril (PRINIVIL,ZESTRIL) 30 MG tablet Take 30 mg by mouth daily.       loperamide (IMODIUM) 2 mg capsule Take 2 mg by mouth 4 (four) times a day as needed for diarrhea.       magnesium hydroxide (MAGNESIUM HYDROXIDE) 400 mg/5 mL Susp suspension Take 30 mL by mouth daily as needed.       magnesium oxide 250 mg Tab Take 250 mg by mouth daily.       memantine (NAMENDA) 5 MG tablet Take 5 mg by mouth daily.       menthol (BIOFREEZE, MENTHOL,) 4 % Gel Apply 1 application topically 3 (three) times a day. To left calf, low back/hips, bilateral shoulders       methylPREDNISolone (MEDROL) 4 MG tablet Take 1 tablet (4 mg total) by mouth daily with breakfast. 1 tablet 0     MUCINEX 600 mg 12 hr tablet Take 600 mg by mouth 2 (two) times a day.  11     multivit,stress formula-zinc (B COMPLEX-C-E-ZINC) Tab tablet Take 1 tablet by mouth daily. 30 tablet 0     neomycin-bacitracin-polymyxin (NEOSPORIN) ointment Apply 1 application topically every 6 (six) hours as needed (scrapes, cuts burns).       nystatin (MYCOSTATIN) powder Apply 1 application topically 3 (three) times a day as needed (redness).        ondansetron (ZOFRAN ODT) 4 MG disintegrating tablet Take 1 tablet (4 mg total) by mouth every 8 (eight) hours as needed for nausea. 12 tablet 0     pantoprazole (PROTONIX) 40 MG tablet Take 40 mg by mouth daily before breakfast.       polyethylene glycol  "(MIRALAX) 17 gram packet Take 1 packet (17 g total) by mouth daily. 30 each 0     polyvinyl alcohol (LIQUIFILM TEARS) 1.4 % ophthalmic solution Administer 1 drop to both eyes 2 (two) times a day.       senna-docusate (SENNOSIDES-DOCUSATE SODIUM) 8.6-50 mg tablet Take 1 tablet by mouth daily. Hold for loose stools       simethicone (MYLICON) 80 MG chewable tablet Chew 160 mg 4 (four) times a day as needed for flatulence.       sodium phosphates 133 mL (FOR FLEET) 19-7 gram/118 mL Enem rectal enema Insert 133 mL into the rectum 2 (two) times a day as needed.       spironolactone (ALDACTONE) 25 MG tablet Take 25 mg by mouth daily.       tiotropium (SPIRIVA) 18 mcg inhalation capsule Place 18 mcg into inhaler and inhale.       umeclidinium (INCRUSE ELLIPTA) 62.5 mcg/actuation DsDv inhaler Inhale 1 puff daily.       No current facility-administered medications on file prior to visit.        Family History   Problem Relation Age of Onset     No Medical Problems Mother      No Medical Problems Father      Heart disease Brother         reports that she quit smoking about 26 years ago. She has a 30.00 pack-year smoking history. she has never used smokeless tobacco. She reports that she drinks alcohol. She reports that she does not use drugs.    Review of Systems:  Negative except leg swelling groin swelling the right leg.  Known issues with lymphedema problems.  Difficulty getting around.    OBJECTIVE:  Vitals:    11/16/18 1231   BP: 108/50   Patient Site: Left Arm   Patient Position: Sitting   Cuff Size: Adult Regular   Pulse: 69   SpO2: 92%   Weight: 190 lb (86.2 kg)   Height: 5' 3\" (1.6 m)     Body mass index is 33.66 kg/m .    EXAM:  GENERAL: This is a well-developed 85 y.o. female who appears her stated age  HEAD: normocephalic  HEENT: Pupils equal and reactive bilaterally  CARDIAC: RRR without murmur  CHEST/LUNG:  Clear to auscultation  ABDOMEN: Soft, nontender, nondistended, no masses    NEUROLOGIC: Focally intact, " nonfocal  VASCULAR: Pulses intact, symmetrical upper and lower extremities.  Swelling of her right leg and thigh.        LABS:  Lab Results   Component Value Date    WBC 4.8 10/30/2018    HGB 9.3 (L) 10/30/2018    HCT 30.5 (L) 10/30/2018    MCV 86 10/30/2018     11/04/2018     INR/Prothrombin Time      No results found for: HGBA1C  Lab Results   Component Value Date    ALT 11 10/30/2018    AST 22 10/30/2018    ALKPHOS 81 10/30/2018    BILITOT 0.5 10/30/2018        Assessment/Plan:   No lymphedema issues secondary to her prior issues and prior past medical history.  We will refer her again to Dr. Danielle will arrange this appointment and get in to see her.  Compression is much as possible ambulation and activity as much as possible and work on weight control     Mejia Hurtado MD  [facility] Department of Surgery

## 2021-06-22 NOTE — ANESTHESIA POSTPROCEDURE EVALUATION
Patient: Jazmin Clifton  LUMBAR 1-LUMBAR 2 BILATERAL LAMINECTOMY AND MEDIAL FACETECTOMY  Anesthesia type: general    Patient location: PACU  Last vitals:   Vitals:    01/09/19 1600   BP: 110/54   Pulse: 70   Resp: 18   Temp: 37  C (98.6  F)   SpO2: 94%     Post vital signs: stable  Level of consciousness: awake and responds to simple questions  Post-anesthesia pain: pain controlled  Post-anesthesia nausea and vomiting: no  Pulmonary: unassisted, return to baseline  Cardiovascular: stable and blood pressure at baseline  Hydration: adequate  Anesthetic events: no    QCDR Measures:  ASA# 11 - Sonia-op Cardiac Arrest: ASA11B - Patient did NOT experience unanticipated cardiac arrest  ASA# 12 - Sonia-op Mortality Rate: ASA12B - Patient did NOT die  ASA# 13 - PACU Re-Intubation Rate: ASA13B - Patient did NOT require a new airway mgmt  ASA# 10 - Composite Anes Safety: ASA10A - No serious adverse event    Additional Notes:

## 2021-06-22 NOTE — TELEPHONE ENCOUNTER
ORDER FROM: Dr. Morris    PRE AUTHORIZATION: PA approved. Ok to schedule.    METHOD OF PATIENT CONTACT: Spoke with SEVERO Isbell at [facility]. Best number to reach [place]: [phone].    PROCEDURE: Lumbar 1- lumbar 2 bilateral laminectomy and medial facetectomy.    SURGICAL DATE: 01/09/19 @ 1:20 PM    READINESS VISIT: Call SEVERO Isbell at [phone].    PCP, CLINIC, PHONE #: Dr. Roby Willis; [facility], [phone].    PRE-OP PHYSICAL: 12/28/18     FILM INFO: MRI LUMBAR: 11/01/18 @ [facility]    SURGICAL LETTER: FAXED TO [facility] [place] ON 01/03/19

## 2021-06-22 NOTE — PROGRESS NOTES
[facility] Consult Note    Date of Service:  12/4/2018    Requesting Provider: Dr. Mejia Hurtado    Chief Complaint: BLE swelling    History of Present Illness: Jazmin Clifton is being seen at the [facility] today regarding BLE swelling. She is a poor historian due to dementia; she arrives alone today. Per chart review patient developed right groin mass this past summer; this was excised by Dr. 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 She was referred to Dr. Danielle she failed to show up to appt. She is currently living at [facility]. She is waiting to have back surgery for her spinal stenosis with Dr. Morris. She is on chronic steroid for this. She has been dealing with swelling in her legs for years; on/off. She is currently wearing single tubigrips bilaterally and feels the swelling is improved. They arrive to the clinic today alone; [email or web address] brought her. Reports pain of 2/10 in the legs; most of the pain is in her back; currently using norco for pain. Denies any fevers, chills, or generalized ill feeling. Denies history of cancer however she reports that she did have a uterine mass with pre-cancerous cells and underwent hysterectomy; did not require chemo or radiation. Had CT abdomen and pelvis 11/18 see report below; no proximal obstruction.  Sleeps in a bed with legs elevated.  Denies history of DVT, cellulitis and vein procedures. Positive history of joint replacement.I personally reviewed outside imaging, lab work, and progress noted through Care Everywhere and outside records. Has 6 sons. Lives in [facility]. Former smoker; smoked for 42 years; states that a pack would last her 1 week. She has history of stroke with left sided weakness.      CT ABDOMEN PELVIS WO ORAL W WO IV CONTRAST  11/5/2018 10:13  PM     INDICATION: F/U renal US showing complex right interpolar exophytic cyst which has a small internal solid nodular component.  TECHNIQUE: CT abdomen and pelvis without contrast and with IV contrast with delayed images. Multiplanar reformation images (MPR). Dose reduction techniques were used.?  IV CONTRAST: Iohexol (Omni) 75 mL.  COMPARISON: Renal ultrasound 10/31/2018.     FINDINGS:  LUNG BASES: Atelectasis. Torsion aorta.     ABDOMEN: Bilateral renal cortical atrophy. There is a 2.3 cm exophytic cyst lateral midpole right kidney. This has a small density within the dependent portion of the cyst which is hyperdense. No convincing enhancement. It is possible this represents   layering hemorrhage or proteinaceous debris and corresponds to the solid and somewhat nodular component seen on recent ultrasound. Cystic neoplasm would also be consideration. A few additional simple cysts within the kidneys. Exophytic 2.4 cm nodule   lower left kidney has a similar appearance to the complex cystic nodule in the right kidney and ultrasound characterization of this cyst is also recommended. Moderate-sized fat-containing periumbilical hernia. Indeterminate mildly hyperdense 1.5 cm   nodule left adrenal gland. 9 mm nodule right adrenal gland. No retroperitoneal lymphadenopathy. Vascular calcification. Previous cholecystectomy. Calcified granulomata in the spleen.      PELVIS: Colonic diverticula without CT evidence for diverticulitis. Streak artifact from left hip arthroplasty. Postsurgical changes in the lumbar spine.     MUSCULOSKELETAL: Postsurgical changes in lumbar spine.     IMPRESSION:   CONCLUSION:  1.  Bilateral renal cortical atrophy. Several simple cysts in the kidneys. There are 2 complex cystic lesions in the kidneys with one in the mid right kidney laterally measures 2.3 cm and contains some mild hyperdensity dependently corresponding to some   solid echogenic material seen on ultrasound and a 2.4 cm exophytic  nodule lower left kidney which is also complex by CT, but appeared simple by ultrasound. Right kidney cyst could represent retracted clot or proteinaceous debris and a three-month   follow-up is recommended.      2.  Indeterminate bilateral adrenal nodules.     3.  Subcentimeter small hyperenhancing nodule in the liver too small to characterize probably a hemangioma.     4.  Cholecystectomy and hysterectomy.     5.  Previous granulomatous infection.     Review of Systems:   Constitutional:  negative  for fever, chills or night sweats  EENTM: positive for glasses;  positive [place]  GI:  negative for nausea/vomiting;  positive for constipation  negative diarrhea;  negative for fecal incontinence negative weight loss  :  negative dysuria, negative incontinence  MS: patient minimally ambulatory;  does use assistive devices  Cardiac:  positive chf  Respiratory:  positive SOB  Endocrine:  negative diabetes  Psych:  negative depression/anxiety    Past Medical History:    Past Medical History:   Diagnosis Date     Arthritis      Brain aneurysm      CHF (congestive heart failure) (H)      Chronic diastolic congestive heart failure (H) 2/2/2018     Chronic kidney disease      COPD (chronic obstructive pulmonary disease) (H)      COPD exacerbation (H) 3/29/2018     Dementia     Mild     Depression      Essential hypertension 2/2/2018     GERD (gastroesophageal reflux disease)      History of anesthesia complications     Delirium related to anesthesia and narcotic medication     History of stroke 2/2/2018     History of transfusion      Hyperlipemia      Hypertension      Multiple closed fractures of metatarsal bone 5/28/2018     Multiple closed fractures of metatarsal bone of left foot 5/28/2018     Near syncope 2/2/2018     Normocytic anemia 3/29/2018     Osteoporosis 5/29/2018     Right groin mass 5/3/2018     Stage 3 chronic kidney disease (H) 3/29/2018     Stenosis of right carotid artery 2/2/2018     Stroke (H) 2017    No  deficits     Urinary tract infection without hematuria, site unspecified      UTI (urinary tract infection) 3/31/2018     Weakness 2/3/2018        Surgical History:   Past Surgical History:   Procedure Laterality Date     BACK SURGERY      Lumbar Laminectomy x3     BLADDER REPAIR       CAROTID ENDARTERECTOMY Right      CEREBRAL ANEURYSM REPAIR       CHOLECYSTECTOMY       HYSTERECTOMY       JOINT REPLACEMENT Left     SATHYA        Medications:    Current Outpatient Medications:      acetaminophen (TYLENOL) 325 MG tablet, Take 1 tablet (325 mg total) by mouth every 4 (four) hours as needed for pain., Disp: , Rfl: 0     alendronate (FOSAMAX) 10 MG tablet, Take 10 mg by mouth daily before breakfast. Take in the morning on an empty stomach with a full glass of water 30 minutes before food, Disp: , Rfl:      ascorbic acid, vitamin C, 500 mg cap, Take 500 mg by mouth daily., Disp: , Rfl:      aspirin 81 mg chewable tablet, Chew 81 mg daily., Disp: , Rfl:      atorvastatin (LIPITOR) 40 MG tablet, Take 40 mg by mouth at bedtime., Disp: , Rfl:      benzocaine-menthol (,CHLORASEPTIC,) 6-10 mg lozenge, Take 1 lozenge by mouth every hour as needed for pain., Disp: , Rfl:      bisacodyl (DULCOLAX) 10 mg suppository, Insert 10 mg into the rectum daily as needed., Disp: , Rfl:      calcium carbonate-vitamin D3 (CALCIUM 600 + D,3,) 600 mg(1,500mg) -200 unit per tablet, Take 1 tablet by mouth daily., Disp: , Rfl:      calcium, as carbonate, (TUMS) 200 mg calcium (500 mg) chewable tablet, Chew 1 tablet every hour as needed for heartburn., Disp: , Rfl:      citalopram (CELEXA) 20 MG tablet, Take 20 mg by mouth daily., Disp: , Rfl:      cyclobenzaprine (FLEXERIL) 5 MG tablet, Take 5 mg by mouth every 8 (eight) hours as needed for muscle spasms., Disp: , Rfl:      donepezil (ARICEPT) 5 MG tablet, Take 5 mg by mouth daily., Disp: , Rfl:      DULoxetine (CYMBALTA) 30 MG capsule, Take 30 mg by mouth daily., Disp: , Rfl:      estradiol  (ESTRACE) 0.01 % (0.1 mg/gram) vaginal cream, Insert 1 g into the vagina 3 (three) times a week. Apply a pea size amount vaginally at bedtime 3x weekly MWF, Disp: , Rfl:      fluticasone (FLONASE) 50 mcg/actuation nasal spray, Apply 1 spray into each nostril 2 (two) times a day., Disp: , Rfl:      fluticasone-vilanterol (BREO ELLIPTA) 100-25 mcg/dose DsDv inhaler, Inhale 1 puff daily., Disp: , Rfl:      furosemide (LASIX) 20 MG tablet, Take 20 mg by mouth daily as needed (weight gain > 2 lbs in one day)., Disp: , Rfl:      gabapentin (NEURONTIN) 100 MG capsule, Take 200 mg by mouth at bedtime., Disp: , Rfl:      guaiFENesin (ROBITUSSIN) 100 mg/5 mL syrup, Take 100-200 mg by mouth every 4 (four) hours as needed for cough. , Disp: , Rfl:      hydrocortisone 1 % cream, Apply 1 application topically every 6 (six) hours as needed., Disp: , Rfl:      isosorbide mononitrate (IMDUR) 30 MG 24 hr tablet, Take 30 mg by mouth daily., Disp: , Rfl:      isradipine (DYNACIRC) 5 MG capsule, Take 5 mg by mouth 2 (two) times a day., Disp: , Rfl:      levalbuterol (XOPENEX) 1.25 mg/3 mL nebulizer solution, Take 1 ampule by nebulization every 4 (four) hours as needed for wheezing., Disp: , Rfl:      LIDOCAINE-METHYL SAL-MENTHOL TOP, Apply topically 3 (three) times a day as needed (hip pain)., Disp: , Rfl:      lisinopril (PRINIVIL,ZESTRIL) 30 MG tablet, Take 30 mg by mouth daily., Disp: , Rfl:      loperamide (IMODIUM) 2 mg capsule, Take 2 mg by mouth 4 (four) times a day as needed for diarrhea., Disp: , Rfl:      magnesium hydroxide (MAGNESIUM HYDROXIDE) 400 mg/5 mL Susp suspension, Take 30 mL by mouth daily as needed., Disp: , Rfl:      magnesium oxide 250 mg Tab, Take 250 mg by mouth daily., Disp: , Rfl:      memantine (NAMENDA) 5 MG tablet, Take 5 mg by mouth daily., Disp: , Rfl:      menthol (BIOFREEZE, MENTHOL,) 4 % Gel, Apply 1 application topically 3 (three) times a day. To left calf, low back/hips, bilateral shoulders, Disp: ,  Rfl:      methylPREDNISolone (MEDROL) 4 MG tablet, Take 1 tablet (4 mg total) by mouth daily with breakfast., Disp: 1 tablet, Rfl: 0     MUCINEX 600 mg 12 hr tablet, Take 600 mg by mouth 2 (two) times a day., Disp: , Rfl: 11     multivit,stress formula-zinc (B COMPLEX-C-E-ZINC) Tab tablet, Take 1 tablet by mouth daily., Disp: 30 tablet, Rfl: 0     neomycin-bacitracin-polymyxin (NEOSPORIN) ointment, Apply 1 application topically every 6 (six) hours as needed (scrapes, cuts burns)., Disp: , Rfl:      nystatin (MYCOSTATIN) powder, Apply 1 application topically 3 (three) times a day as needed (redness). , Disp: , Rfl:      ondansetron (ZOFRAN ODT) 4 MG disintegrating tablet, Take 1 tablet (4 mg total) by mouth every 8 (eight) hours as needed for nausea., Disp: 12 tablet, Rfl: 0     pantoprazole (PROTONIX) 40 MG tablet, Take 40 mg by mouth daily before breakfast., Disp: , Rfl:      polyethylene glycol (MIRALAX) 17 gram packet, Take 1 packet (17 g total) by mouth daily., Disp: 30 each, Rfl: 0     polyvinyl alcohol (LIQUIFILM TEARS) 1.4 % ophthalmic solution, Administer 1 drop to both eyes 2 (two) times a day., Disp: , Rfl:      senna-docusate (SENNOSIDES-DOCUSATE SODIUM) 8.6-50 mg tablet, Take 1 tablet by mouth daily. Hold for loose stools, Disp: , Rfl:      simethicone (MYLICON) 80 MG chewable tablet, Chew 160 mg 4 (four) times a day as needed for flatulence., Disp: , Rfl:      sodium phosphates 133 mL (FOR FLEET) 19-7 gram/118 mL Enem rectal enema, Insert 133 mL into the rectum 2 (two) times a day as needed., Disp: , Rfl:      spironolactone (ALDACTONE) 25 MG tablet, Take 25 mg by mouth daily., Disp: , Rfl:      tiotropium (SPIRIVA) 18 mcg inhalation capsule, Place 18 mcg into inhaler and inhale., Disp: , Rfl:      umeclidinium (INCRUSE ELLIPTA) 62.5 mcg/actuation DsDv inhaler, Inhale 1 puff daily., Disp: , Rfl:     Allergies:   Allergies   Allergen Reactions     Augmentin [Amoxicillin-Pot Clavulanate]      Pt was  "prescribed cephalexin in 2017 (no rxn documented), 10/30/18 inpt ceftriaxone given     Levofloxacin Myalgia     Pt prescribed ciprofloxacin in 2018 (no rxn documented)     Macrobid [Nitrofurantoin Monohyd/M-Cryst] Nausea And Vomiting     Morphine Other (See Comments)     Hallucination     Norvasc [Amlodipine] Swelling     Quinapril Other (See Comments) and Dizziness     Hypertension. 3.29.18 - Takes and tolerates lisinopril       Family history:   Family History   Problem Relation Age of Onset     No Medical Problems Mother      No Medical Problems Father      Heart disease Brother         Social History:   Social History     Socioeconomic History     Marital status:      Spouse name: Not on file     Number of children: Not on file     Years of education: Not on file     Highest education level: Not on file   Social Needs     Financial resource strain: Not on file     Food insecurity - worry: Not on file     Food insecurity - inability: Not on file     Transportation needs - medical: Not on file     Transportation needs - non-medical: Not on file   Occupational History     Not on file   Tobacco Use     Smoking status: Former Smoker     Packs/day: 0.50     Years: 60.00     Pack years: 30.00     Last attempt to quit: 1992     Years since quittin.8     Smokeless tobacco: Never Used   Substance and Sexual Activity     Alcohol use: Yes     Comment: Rare     Drug use: No     Sexual activity: Not on file   Other Topics Concern     Not on file   Social History Narrative     Not on file        Physical Exam  Vitals: Blood pressure 122/58, pulse 68, temperature 97.7  F (36.5  C), resp. rate 18, height 5' 3\" (1.6 m), weight 195 lb 9.6 oz (88.7 kg), not currently breastfeeding.  General: This is a 85 y.o. female who appears their reported age, not in acute distress  Head: normocephalic, Atraumatic; wearing glasses; non-icteric sclera; no exudate; mild hearing loss   Respiratory: Clear throughout all " lung fields; unlabored breathing; no cough   Cardiac: Regular, Rate and Rhythm, no murmurs appreciated   Skin: Uniformly warm and dry  Psych: Alert and oriented x4; calm and cooperative throughout exam  Abdomen: Normal bowel sounds. Soft, symmetric, no guarding or rigidity, nontender with palpation.  No organomegaly or masses palpated.   Lymphatics: No palpable nodes to bilateral groin right groin with well healed surgical incision; continued normal induration and fibrosis present; non-tender to palpation  Extremities: BLE with +1-2 edema; tissues are slightly firm and fibrotic; no rash; no open areas; no blistering; no weeping; nails well trimmed; webbing intact; skin appears well moisturized; strength testing revealed 3/4 to LLE and 4/4 on the RLE; good ROM at the ankles; diminished hair growth below the knee.    Sensation: Intact to pinprick and light touch throughout lower extremities bilaterally     Peripheral Vascular: normal dorsalis pedis, posterior tibial pulses to bilateral feet , using a handheld doppler these were strong easily found and biphasic in nature.  Good capillary refill. No unusual venous distention. Positive for spider veins, telangiectasias, hemosiderin deposition or hyperpigmentation and fibrosis or scarring      Circumferential volume measures:    Vasc Edema 12/4/2018   Right just above MTP 22.6   Right Ankle 22.6   Right Widest Calf 37   Right Thigh Up 10cm 51.2   Left - just above MTP 23   Left Ankle 21.8   Left Widest Calf 36   Left Thigh Up 10cm 49       Ulceration(s)/Wound(s):     Incision 05/03/18 Groin Right (Active)       Laboratory studies:   Lab Results   Component Value Date    SEDRATE 26 (H) 10/30/2018     Lab Results   Component Value Date    CREATININE 0.96 11/06/2018     No results found for: HGBA1C  Lab Results   Component Value Date    BUN 31 (H) 11/06/2018     Lab Results   Component Value Date    ALBUMIN 2.9 (L) 10/30/2018     Vitamin D, Total (25-Hydroxy)   Date Value  Ref Range Status   10/29/2018 34.9 30.0 - 80.0 ng/mL Final             Impression:   Dependent edema  Venous hyptertension  Venous insufficiency  Right groin mass; s/p excision; lymphoangioma  Spinal stenosis on chronic steroid  Immobility  CHF  CKD stage 3  History of left hip replacement  History of stroke with left sided weakness        Assessment/Plan:  1. Excisional debridement: na    2. Edema. We spoke at length regarding their swelling, potential causes, and treatment options. Answered all questions. Their swelling is likely multifactorial including but not limited to the following: their weight, dependency of the limbs for most hours of the day, reduced activity with reduced calf pump mechanism, congestive heart failure, kidney disease, venous hypertension, valvular incompetence, high sodium diet, hypoabluminemia, side effect of certain medications and history of joint replacements. Her swelling is well controlled today; ready for compression garments; such as compression stockings or velcro wraps. The tubigrips are inadequate for her condition; will double these bilaterally until she can get compression stockings; provided her with [employer] walk[employer] catalogue to purchase these inexpensively. Staff at her facility can help don/doff these daily.  The patient should continue to elevate their legs periodically throughout the day. Continue to take their diuretics per their PMD recommendations. From a swelling standpoint safe to move forward with back surgery with Dr. Morris. Will send message.      3. Treatment: wound treatment will include irrigation and dressings to promote autolytic debridement which will include: lotion daily; compression stocking    4. Offloading: na    5. Nutrition: continue to work on weight; focus on protein; last albumin noted to be low    Patient to return to clinic PRN for re-evaluation. They were instructed to call the clinic sooner with any further questions or concerns. Answered all  questions.    Deidre Montenegro DNP, RN, CNP, [facility]  [phone]      This note was electronically signed by Deidre Montenegro

## 2021-06-22 NOTE — ANESTHESIA CARE TRANSFER NOTE
Last vitals:   Vitals:    01/09/19 1503   BP: 155/65   Pulse: 70   Resp: 16   Temp: 36.4  C (97.6  F)   SpO2: 98%     Patient's level of consciousness is awake  Spontaneous respirations: yes  Maintains airway independently: yes  Dentition unchanged: yes  Oropharynx: oropharynx clear of all foreign objects    QCDR Measures:  ASA# 20 - Surgical Safety Checklist: WHO surgical safety checklist completed prior to induction    PQRS# 430 - Adult PONV Prevention: 4558F - Pt received => 2 anti-emetic agents (different classes) preop & intraop  ASA# 8 - Peds PONV Prevention: NA - Not pediatric patient, not GA or 2 or more risk factors NOT present  PQRS# 424 - Sonia-op Temp Management: 4559F - At least one body temp DOCUMENTED => 35.5C or 95.9F within required timeframe  PQRS# 426 - PACU Transfer Protocol: - Transfer of care checklist used  ASA# 14 - Acute Post-op Pain: ASA14B - Patient did NOT experience pain >= 7 out of 10

## 2021-06-22 NOTE — ANESTHESIA PREPROCEDURE EVALUATION
Anesthesia Evaluation      Patient summary reviewed   History of anesthetic complications (delirium with anesthesia and narcotics)     Airway   Mallampati: III  Neck ROM: limited   Pulmonary - normal exam   (+) COPD, shortness of breath,                          Cardiovascular - normal exam  Exercise tolerance: < 4 METS  (+) hypertension, CHF (diastolic), , hypercholesterolemia,     ECG reviewed        Neuro/Psych    (+) CVA (residual left facial droop.) , dementia, chronic pain    Endo/Other    (+) arthritis, obesity,      GI/Hepatic/Renal    (+) GERD intermittent,   chronic renal disease CRI,           Dental    (+) upper dentures, lower dentures and poor dentition                       Anesthesia Plan  Planned anesthetic: general endotracheal  50 mg ketamine IV on induction.    ASA 4   Induction: intravenous   Anesthetic plan and risks discussed with: patient  Anesthesia plan special considerations: video-assisted, antiemetics,   Post-op plan: routine recovery

## 2021-06-23 NOTE — TELEPHONE ENCOUNTER
COPD educator note    Talked with Jazmin today. She states she is doing fine, had a bout of bronchitis but was able to stay out of the hospital. Pt had no questions at this time. We will call again next month.    MYRANDA PalmerT

## 2021-06-23 NOTE — TELEPHONE ENCOUNTER
PATIENT NAME:  Jazmin Clifton  YOB: 1932  MRN: [id number]  SURGEON: Dr. Morris  DATE of SURGERY: 1-19-19  PROCEDURE: LUMBAR 1-LUMBAR 2 BILATERAL LAMINECTOMY AND MEDIAL FACETECTOMY    FOLLOW-UP:  Wound Check:  7 days [On nurse schedule unless noted otherwise]  Staples/Sutures Out : 14 Days [On nurse schedule unless noted otherwise]  Post Op Visit: 4 weeks   Post-op Provider: NP  DIAGNOSTICS:  none  [phone][facility]    ADDITIONAL INSTRUCTIONS FOR MEDICAL STAFF:

## 2021-06-23 NOTE — PROGRESS NOTES
Jazmin is here for staple removal. She is s/p LUMBAR 1-LUMBAR 2 BILATERAL LAMINECTOMY AND MEDIAL FACETECTOMY on 1/9/19 by Dr. Morris. She originally presented with low back and buttock pain. She denied any leg pain. She also had some weakness in left leg. Today pt states she is feeling much better. She denies any back pain but just sore around incision area.   Surgical wound WNL - CDI, no signs of infection or skin breakdown.  Incision well-healed: good skin approximation, no redness or visible/palpable edema, no tenderness to palpation.  PT. AF, denies fever, chills or sweats.  Pt. reports that the symptoms are improved from pre-op.  Staples - intact removed without difficulty. Wound prepped with Betadine before and after removal.  Surrounding skin has no signs of breakdown.  Verbal instructions regarding incision care are given.  Pt. advised to call us if any s/s of infection noted - all discussed in details.   Pt had sutures in drain sight and were prepped and taken out. She also had a small suture peeking through at top of incision sight and was snipped down as close to skin as possible.   Suture removal sutures intact removed without difficulty. Wound prepped with Betadine before and after removal.  Surrounding skin has no signs of breakdown.  Verbal instructions regarding incision care are given.  Pt. advised to call us if any s/s of infection noted - all discussed in details.  Pt was advised to keep covered for today and to not shower today. She was instructed to continue to watch for signs of infection. Pt understood.   Georgie,CMA

## 2021-06-23 NOTE — PATIENT INSTRUCTIONS - HE
Continue with restrictions and continue to watch for signs of infection. You may shower and let incision get wet but do not let water directly hit on incision.

## 2021-06-24 NOTE — PATIENT INSTRUCTIONS - HE
1.  Physical therapy for core, low back and hip strength.  2.  I recommend an MRI in the setting of no improvement in symptoms. It is understood that you are not able to undergo this at this time due to family having commitments. If no improvement consider returning to clinic with an MRI of lumbar spine.   3.  Consider a pain clinic evaluation. You have chronic pain.

## 2021-06-24 NOTE — TELEPHONE ENCOUNTER
Monthly follow up phone call for the COPD Program. Spoke with Jazmin today for ongoing COPD Education. She's doing well at home. No questions or concerns today.

## 2021-06-24 NOTE — PROGRESS NOTES
NEUROSURGERY FOLLOW UP EVALUATION:    ASSESSMENT/ PLAN:  Jazmin Clifton is a 86 y.o. female,  s/p LUMBAR 1-LUMBAR 2 BILATERAL LAMINECTOMY AND MEDIAL FACETECTOMY on 1/9/19 by Dr. Morris. She originally presented with low back and buttock pain. She denied any leg pain. She also had some weakness in left leg. She has chronic back and hip pain. Her son says she's been in a wheelchair for at least a couple of years with minimal activity.     PLAN:  1.  Physical therapy for core, low back and hip strength.  2.  I recommend an MRI in the setting of no improvement in symptoms. It is understood that you are not able to undergo this at this time due to family having commitments. If no improvement consider returning to clinic with an MRI of lumbar spine.   3.  Consider a pain clinic evaluation. You have chronic pain.     Today she reports no change in her preop pain. She still has pain in her back like she did before surgery. She and her son state that she won't ever have another surgery. She is not able to get to outpatient physical therapy and only wants to do it at her facility.     EXAM:    Alert and oriented x3, speech clear  Arm strength: 5/5  Leg strength: 5/5   Bulk and tone: normal  Gait seen in wheel chair  Incision well healed.      IMAGING:    None today      Kaity Mujica NP  [facility] Neurosurgery

## 2021-07-06 NOTE — IP AVS SNAPSHOT
MRN:[id number]                      After Visit Summary   7/26/2017    Jazmin Clifton    MRN: [id number]           Thank you!     Thank you for choosing [facility] for your care. Our goal is always to provide you with excellent care. Hearing back from our patients is one way we can continue to improve our services. Please take a few minutes to complete the written survey that you may receive in the mail after you visit with us. Thank you!        Patient Information     Date Of Birth          12/30/1932        Designated Caregiver       Most Recent Value    Caregiver    Will someone help with your care after discharge? no [pt lives in a NH]      About your hospital stay     You were admitted on:  July 26, 2017 You last received care in the:  [facility]    You were discharged on:  July 28, 2017       Who to Call     For medical emergencies, please call 911.  For non-urgent questions about your medical care, please call your primary care provider or clinic, [phone]          Attending Provider     Provider Specialty    Robert Borjas MD Emergency Medicine    [facility], Shamar Mcrae MD [facility], Kenton AMIN MD [facility]       Primary Care Provider Office Phone # Fax #    Mirian Aguilera [phone]      After Care Instructions     Activity - Up with nursing assistance           Advance Diet as Tolerated       Follow this diet upon discharge: Cardiac            General info for SNF       Length of Stay Estimate: Short Term Care: Estimated # of Days <30  Condition at Discharge: Improving  Level of care:skilled   Rehabilitation Potential: Good  Admission H&P remains valid and up-to-date: Yes  Recent Chemotherapy: N/A  Use Nursing Home Standing Orders: Yes                  Your next 10 appointments already scheduled     Sep 25, 2017  9:30 AM CDT   New Visit with Heather Whitaker MD   [facility]     [address]  [facility] [address] [phone]              Additional Services     Med Therapy Manage Referral       Your provider has referred you to: **[facility] Scheduling (numerous locations) [phone]   [email or web address]    Reason for Referral: AJAY    The [facility] department will contact you to schedule an appointment.  You may also schedule the appointment by calling [phone].  For [facility] [place] [facility] [place] patients, please call [phone] to confirm/schedule your appointment on the next business day.    This service is designed to help you get the most from your medications.  A specially trained Pharmacist will work closely with you and your providers to solve any questions, concerns, issues or problems related to your medications.    Please bring all of your prescription and non-prescription medications (such as vitamins, over-the-counter medications, and herbals) or a detailed medication list to your appointment.    If you have a glucose meter or other home monitoring information, please also bring this to your appointment (i.e. blood glucose log, blood pressure log, pain log, etc.).            NEUROLOGY ADULT REFERRAL       Your provider has referred you for the following:   Consult at [facility]: [facility] [phone]   [email or web address]  [facility]: [facility] [phone]   [email or web address]    Please be aware that coverage of these services is subject to the terms and limitations of your health insurance plan.  Call member services at your health plan with any benefit or coverage questions.      Please bring the following with you to your appointment:    (1) Any X-Rays, CTs or MRIs which have been performed.  Contact the facility where they were done to arrange for  prior to your  "scheduled appointment.    (2) List of current medications  (3) This referral request   (4) Any documents/labs given to you for this referral            Occupational Therapy Adult Consult       Evaluate and treat as clinically indicated.    Reason:  Weakness, left facial droop, suspect CVA            Physical Therapy Adult Consult       Evaluate and treat as clinically indicated.    Reason:  Weakness, left facial droop, suspect CVA            Speech Language Path Adult Consult       Evaluate and treat as clinically indicated.    Reason:  Weakness, left facial droop, suspect CVA                  Pending Results     No orders found from 2017 to 2017.            Statement of Approval     Ordered          17 1544  I have reviewed and agree with all the recommendations and orders detailed in this document.  EFFECTIVE NOW     Approved and electronically signed by:  Kenton Blackwood MD             Admission Information     Date & Time Provider Department Dept. Phone    2017 Kenton Blackwood MD [facility] [phone]      Your Vitals Were     Blood Pressure Pulse Temperature Respirations Height Weight    193/97 56 98  F (36.7  C) (Oral) 16 1.6 m (5' 3\") 86.4 kg (190 lb 7.6 oz)    Pulse Oximetry BMI (Body Mass Index)                96% 33.74 kg/m2          MyChart Information     [employer] lets you send messages to your doctor, view your test results, renew your prescriptions, schedule appointments and more. To sign up, go to [email or web address] . Click on \"Log in\" on the left side of the screen, which will take you to the Welcome page. Then click on \"Sign up Now\" on the right side of the page.     You will be asked to enter the access code listed below, as well as some personal information. Please follow the directions to create your username and password.     Your access code is: [email or web address]  Expires: 10/26/2017  1:54 PM     Your access code will  in 90 days. If you need " help or a new code, please call your [facility] clinic or [phone].        Care EveryWhere ID     This is your Care EveryWhere ID. This could be used by other organizations to access your [facility] medical records  [id number]        Equal Access to Services     AISSATOU MCADAMS : Hadii aad ku hadsherrybasil Galakeila, wafloridalmada luqadaha, qacoleta kaalmada adeedi, waxkarli andrew innajoon bassettpricilaleanna jamesno. So [facility] [phone].    ATENCIÓN: Si habla español, tiene a ortega disposición servicios gratuitos de asistencia lingüística. Llame al [phone].    We comply with applicable federal civil rights laws and [place] laws. We do not discriminate on the basis of race, color, national origin, age, disability sex, sexual orientation or gender identity.               Review of your medicines      START taking        Dose / Directions    atorvastatin 40 MG tablet   Commonly known as:  LIPITOR   Used for:  Stenosis of right carotid artery        Dose:  40 mg   Take 1 tablet (40 mg) by mouth daily   Quantity:  30 tablet   Refills:  3       clopidogrel 75 MG tablet   Commonly known as:  PLAVIX   Used for:  Facial droop        Dose:  75 mg   Take 1 tablet (75 mg) by mouth daily   Quantity:  30 tablet   Refills:  0         CONTINUE these medicines which have NOT CHANGED        Dose / Directions    ACIDOPHILUS PO        Dose:  1 capsule   Take 1 capsule by mouth 2 times daily   Refills:  0       budesonide-formoterol 80-4.5 MCG/ACT Inhaler   Commonly known as:  SYMBICORT        Dose:  2 puff   Inhale 2 puffs into the lungs 2 times daily   Refills:  0       calcium carb 1250 mg (500 mg [place])/vitamin D 200 units 500-200 MG-UNIT per tablet   Commonly known as:  OSCAL with D        Dose:  1 tablet   Take 1 tablet by mouth daily   Refills:  0       CELEXA PO        Dose:  20 mg   Take 20 mg by mouth daily   Refills:  0       CEPACOL DUAL RELIEF PO        Give 1 dose by mouth every 2 hours as needed for Sore throat Per RSO - package  instructions 1 lozenge Q 2 hours.   Refills:  0       COLACE PO        Dose:  100 mg   Take 100 mg by mouth daily   Refills:  0       cyanocobalamin 1000 MCG tablet   Commonly known as:  vitamin  B-12        Dose:  1000 mcg   Take 1,000 mcg by mouth daily   Refills:  0       ICY HOT LIDOCAINE PLUS MENTHOL EX        Externally apply topically 3 times daily Apply to hips   Refills:  0       isosorbide mononitrate 30 MG 24 hr tablet   Commonly known as:  IMDUR        Dose:  30 mg   Take 30 mg by mouth daily   Refills:  0       LASIX PO        Dose:  20 mg   Take 20 mg by mouth 2 times daily   Refills:  0       LISINOPRIL PO        Dose:  20 mg   Take 20 mg by mouth daily   Refills:  0       MAGNESIUM OXIDE PO        Dose:  250 mg   Take 250 mg by mouth daily   Refills:  0       NAMENDA PO        Dose:  5 mg   Take 5 mg by mouth daily   Refills:  0       NEURONTIN PO        Dose:  300 mg   Take 300 mg by mouth 2 times daily   Refills:  0       nystatin 309977 UNIT/GM Powd   Commonly known as:  MYCOSTATIN        Apply topically 2 times daily   Refills:  0       OXYCODONE HCL PO        Dose:  2.5 mg   Take 2.5 mg by mouth every 4 hours as needed Give 2.5 mg po Q 4 hrs prn pain   Refills:  0       potassium chloride 20 MEQ Packet   Commonly known as:  KLOR-CON        Dose:  10 mEq   Take 10 mEq by mouth daily Give 10 mEq by mouth one time a day for supplement   Refills:  0       PRILOSEC PO        Dose:  20 mg   Take 20 mg by mouth every morning   Refills:  0       tiotropium 18 MCG capsule   Commonly known as:  SPIRIVA        Dose:  18 mcg   Inhale 18 mcg into the lungs daily   Refills:  0       TRIMETHOPRIM PO        Dose:  100 mg   Take 100 mg by mouth daily Give 1 tablet by mouth one time a day for preventative for UTI   Refills:  0       TYLENOL PO        Dose:  1000 mg   Take 1,000 mg by mouth 3 times daily   Refills:  0         STOP taking     ASPIRIN PO           ZOCOR PO                Where to get your medicines       Some of these will need a paper prescription and others can be bought over the counter. Ask your nurse if you have questions.     You don't need a prescription for these medications     atorvastatin 40 MG tablet    clopidogrel 75 MG tablet                Protect others around you: Learn how to safely use, store and throw away your medicines at www.disposemymeds.org.             Medication List: This is a list of all your medications and when to take them. Check marks below indicate your daily home schedule. Keep this list as a reference.      Medications           Morning Afternoon Evening Bedtime As Needed    ACIDOPHILUS PO   Take 1 capsule by mouth 2 times daily                                atorvastatin 40 MG tablet   Commonly known as:  LIPITOR   Take 1 tablet (40 mg) by mouth daily   Last time this was given:  40 mg on 7/28/2017  9:05 AM                                budesonide-formoterol 80-4.5 MCG/ACT Inhaler   Commonly known as:  SYMBICORT   Inhale 2 puffs into the lungs 2 times daily                                calcium carb 1250 mg (500 mg [place])/vitamin D 200 units 500-200 MG-UNIT per tablet   Commonly known as:  OSCAL with D   Take 1 tablet by mouth daily   Last time this was given:  1 tablet on 7/28/2017  9:05 AM                                CELEXA PO   Take 20 mg by mouth daily   Last time this was given:  20 mg on 7/28/2017  9:05 AM                                CEPACOL DUAL RELIEF PO   Give 1 dose by mouth every 2 hours as needed for Sore throat Per RSO - package instructions 1 lozenge Q 2 hours.                                clopidogrel 75 MG tablet   Commonly known as:  PLAVIX   Take 1 tablet (75 mg) by mouth daily   Last time this was given:  75 mg on 7/28/2017  9:05 AM                                COLACE PO   Take 100 mg by mouth daily   Last time this was given:  100 mg on 7/28/2017  9:05 AM                                cyanocobalamin 1000 MCG tablet   Commonly known as:  vitamin   B-12   Take 1,000 mcg by mouth daily   Last time this was given:  1,000 mcg on 7/28/2017  9:07 AM                                ICY HOT LIDOCAINE PLUS MENTHOL EX   Externally apply topically 3 times daily Apply to hips                                isosorbide mononitrate 30 MG 24 hr tablet   Commonly known as:  IMDUR   Take 30 mg by mouth daily   Last time this was given:  30 mg on 7/28/2017  9:07 AM                                LASIX PO   Take 20 mg by mouth 2 times daily   Last time this was given:  20 mg on 7/28/2017  9:05 AM                                LISINOPRIL PO   Take 20 mg by mouth daily                                MAGNESIUM OXIDE PO   Take 250 mg by mouth daily   Last time this was given:  200 mg on 7/28/2017  9:07 AM                                NAMENDA PO   Take 5 mg by mouth daily   Last time this was given:  5 mg on 7/28/2017  9:06 AM                                NEURONTIN PO   Take 300 mg by mouth 2 times daily   Last time this was given:  300 mg on 7/28/2017  9:05 AM                                nystatin 052576 UNIT/GM Powd   Commonly known as:  MYCOSTATIN   Apply topically 2 times daily                                OXYCODONE HCL PO   Take 2.5 mg by mouth every 4 hours as needed Give 2.5 mg po Q 4 hrs prn pain   Last time this was given:  2.5 mg on 7/28/2017  5:30 AM                                potassium chloride 20 MEQ Packet   Commonly known as:  KLOR-CON   Take 10 mEq by mouth daily Give 10 mEq by mouth one time a day for supplement   Last time this was given:  10 mEq on 7/28/2017  9:04 AM                                PRILOSEC PO   Take 20 mg by mouth every morning   Last time this was given:  20 mg on 7/28/2017  9:05 AM                                tiotropium 18 MCG capsule   Commonly known as:  SPIRIVA   Inhale 18 mcg into the lungs daily                                TRIMETHOPRIM PO   Take 100 mg by mouth daily Give 1 tablet by mouth one time a day for preventative  for UTI   Last time this was given:  100 mg on 7/28/2017  9:07 AM                                TYLENOL PO   Take 1,000 mg by mouth 3 times daily   Last time this was given:  1,000 mg on 7/28/2017  9:05 AM                                   Complex Repair And O-T Advancement Flap Text: The defect edges were debeveled with a #15 scalpel blade.  The primary defect was closed partially with a complex linear closure.  Given the location of the remaining defect, shape of the defect and the proximity to free margins an O-T advancement flap was deemed most appropriate for complete closure of the defect.  Using a sterile surgical marker, an appropriate advancement flap was drawn incorporating the defect and placing the expected incisions within the relaxed skin tension lines where possible.    The area thus outlined was incised deep to adipose tissue with a #15 scalpel blade.  The skin margins were undermined to an appropriate distance in all directions utilizing iris scissors.

## 2022-03-10 NOTE — TELEPHONE ENCOUNTER
[facility] TELEPHONE ENCOUNTER    Jazmin Clifton is a 87 year old  (12/30/1932), Lab called today to report INR 5.06    ASSESSMENT/PLAN  Called facility staff and they did get off the phone with coumadin clinic and they gave orders to hold today and tomorrow.     No new orders.       Electronically signed by:   MARY Barajas CNP  
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

## 2023-09-13 NOTE — PATIENT INSTRUCTIONS
PLAN  1. Medications:   1. Continue Tylenol Extra Strength 500-1000mg take 3 times daily as needed.  2. Continue Cymbalta 60mg in the AM and 30mg in the PM.  3. Continue Gabapentin 200mg twice daily, may consider increase in the future, especially at night  4. Continue to use 4% Lidocaine patches re: lateral hip pain  5. Continue Oxycodone 5mg take 3 times daily and 2 tablets as needed, will leave with Primary Care Provider   1. I will discuss some other options with your Primary Care Provider, I'd like to offer you more long acting pain medication vs only short-acting. Options might include oxyContin, Butrans patch OR Duragesic patch  6. Continue Tizanidine 2mg take 3 times daily as needed for muscle spasms.   2. Procedures: None at present   3. Diagnostic studies: not at present.  4. Jazmin to follow up with Primary Care provider regarding elevated blood pressure.  5. Schedule with Dr. Roberto Ríos or Dr. David Stuart with [facility] re: right hip pain in [place].   6. Follow-up with me in 4-6 weeks.     ----------------------------------------------------------------  Clinic Number:  [phone]   Call this number with any questions about your care and for scheduling assistance. Calls are returned Monday through Friday between 8 AM and 4:30 PM. We usually get back to you within 2 business days depending on the issue/request.       Medication refills:    For non-opioid medications, call your pharmacy directly to request a refill. The pharmacy will contact the [facility] for authorization. Please allow 3-4 days for these refills to be processed.     For opioid refills, call the clinic number or send a [employer] message. Please contact us 7-10 days before your refill is due. The message MUST include the name of the specific medication(s) requested and how you would like to receive the prescription(s). The options are as follows:    Pain Clinic staff can mail the prescription to your  pharmacy. Please tell us the name of the pharmacy.    You may pick the prescription up at the Pain Clinic (tell us the location) or during a clinic visit with your pain provider    Pain Clinic staff can deliver the prescription to the [facility] pharmacy in the clinic building. Please tell us the location.      We believe regular attendance is key to your success in our program.    Any time you are unable to keep your appointment we ask that you call us at least 24 hours in advance to let us know. This will allow us to offer the appointment time to another patient.        Suturegard Retention Suture: 2-0 Nylon

## 2024-06-17 PROBLEM — Z76.89 HEALTH CARE HOME: Status: RESOLVED | Noted: 2019-02-13 | Resolved: 2024-06-17

## 2024-09-17 NOTE — PROGRESS NOTES
PT contacted provider after  lymph edema therapy visit and reported erythema and excessive warmth to right lower extremity consistent with cellulitis. She is currently afebrile. Will start keflex (does have PCN allergy, but appears to have tolerated keflex before) and follow up next week .  
No

## 2025-04-02 NOTE — PLAN OF CARE
S:  Patient inpatient at [facility]    B:  Patient admitted 6/2 after falling at home at [place] [facility]    A:  A&O x 4  Pain is much better controlled with increased hydromorphone  Vitals stable  Has skin tear on left anterior shin and left elbow, both covered with mepilex foam  Patient states she is feeling much better  Patient looks more aware to writer than she did when writer assessed patient yesterday  Still using mechanical lift for transfers    R:  Patient slept comfortably for much of night    Lit Prieto RN     Secure chat sent to Zeny Barroso NP as medication has side effects of QT prolongation, elevated lipids, and HTN. Recommends FLP ordered 3 months after initiation.  Patient notified via portal.

## 2025-07-14 NOTE — PROGRESS NOTES
"[facility]    Chief Complaint   Patient presents with     jail Acute       [facility] Record Number:  [id number]  Place of Service where encounter took place:  [facility] ([facility]) [[id number]]    HPI:    Jazmin Clifton is a 85 year old  (12/30/1932), who is being seen today for an episodic care visit.  HPI information obtained from: facility chart records, facility staff, patient report and [facility] chart review.    Today's concern is:     Spinal stenosis of lumbar region with neurogenic claudication  Mass of right inguinal region  Nausea  Benign essential hypertension  Slow transit constipation   Patient reports she is not feeling well today, but is better this afternoon that she was this AM. States she felt clammy, nauseated and not well this AM. Felt lightheaded when standing with therapy. Has had intermittent nausea which has been ongoing since prior to her recent hospital stay. She reports she has had some shaking in her arms and legs, but this is not present currently. She wants to know why she was changed to oxycodone from her Norco, discussed that this was done per her request as she had been on oxycodone before and felt it provided her with better pain control. She does not remember this, but does state she \"probably said that.\" She is concerned the oxycodone is causing her nausea, lightheadedness, and clamminess and would like to go back on the Norco as she thinks it was adequate for her pain. She also reports she is constipated, and it is painful to pass stool as it is quite hard. She has been drinking miralax and prune juice. She reports frustration as now knowing when her back surgery is going to be as neurosurgery is waiting for clearance from spinal surgery.      ALLERGIES: Accupril; Ace inhibitors; Augmentin; Levofloxacin; Macrobid [nitrofurantoin]; Morphine; Norvasc [amlodipine besylate]; and Quinapril  Past Medical, Surgical, Family and Social " Patient examined, record reviewed, agree with findings and recommendations as documented above.   History reviewed and updated in [facility].    Current Outpatient Prescriptions   Medication Sig Dispense Refill     Ascorbic Acid (VITAMIN C PO) Take 500 mg by mouth daily       aspirin 81 MG chewable tablet Take 81 mg by mouth       atorvastatin (LIPITOR) 40 MG tablet TAKE 1 TAB BY MOUTH AT BEDTIME FOR HIGH CHOLESTEROL 30 tablet 3     B COMPLEX-C-E-ZN PO Take 1 tablet by mouth daily       bisacodyl (DULCOLAX) 10 MG Suppository Place 10 mg rectally daily as needed for constipation       CALCIUM 600+D 600-200 MG-UNIT TABS TAKE 1 TAB BY MOUTH ONCE DAILY 30 tablet 11     calcium carbonate (TUMS) 500 MG chewable tablet Take 1 chew tab by mouth every hour as needed for heartburn       CYCLOBENZAPRINE HCL PO Take 5 mg by mouth 3 times daily as needed for muscle spasms       DONEPEZIL HCL PO Take 5 mg by mouth daily       DULoxetine HCl (CYMBALTA PO) Take 30 mg by mouth daily       ESTRIOL 1MG/GRAM CREAM Apply 1 g topically See Admin Instructions Insert 1 application vaginally at bedtime every Mon, Wed, Fri for vaginal dryness, burning, itching apply pea size amount vaginally at bedtime three times a week       fluticasone (FLONASE) 50 MCG/ACT spray Spray 1 spray into both nostrils 2 times daily       fluticasone-vilanterol (BREO ELLIPTA) 100-25 MCG/INH oral inhaler Inhale 1 puff into the lungs daily       Furosemide (LASIX PO) Take 40 mg by mouth daily        FUROSEMIDE PO Take 20 mg by mouth daily as needed for weight gain daily for >2 pound weight gain in one day       GABAPENTIN PO Take 100 mg by mouth every morning       GABAPENTIN PO Take 200 mg by mouth At Bedtime        guaiFENesin (ROBITUSSIN) 100 MG/5ML SYRP Take 10 mLs by mouth every 4 hours as needed for cough        HYDROcodone-acetaminophen (NORCO) 5-325 MG tablet Take 1 tablet by mouth 3 times daily       HYDROcodone-acetaminophen (NORCO) 5-325 MG tablet Take 1 tablet by mouth every 4 hours as needed for severe pain       hydrocortisone 1 % CREA cream Apply  topically every 6 hours as needed for itching       isosorbide mononitrate (IMDUR) 30 MG 24 hr tablet TAKE 1 TAB BY MOUTH ONCE DAILY FOR HYPERTENSION/HIGH BLOOD PRESSURE 30 tablet 3     isradipine (DYNACIRC) 5 MG capsule Take 5 mg by mouth 2 times daily       levalbuterol (XOPENEX) 1.25 MG/3ML neb solution Take 1 ampule by nebulization every 4 hours as needed for shortness of breath / dyspnea or wheezing       LISINOPRIL PO Take 20 mg by mouth daily       loperamide (IMODIUM) 2 MG capsule Take 2 mg by mouth 4 times daily as needed for diarrhea       magnesium hydroxide (MILK OF MAGNESIA) 400 MG/5ML suspension Take 30 mLs by mouth daily as needed for constipation or heartburn       MAGNESIUM OXIDE PO Take 250 mg by mouth daily       memantine (NAMENDA) 5 MG tablet TAKE 1 TAB BY MOUTH ONCE DAILY FOR DEMENTIA 30 tablet 3     Menthol, Topical Analgesic, (BIOFREEZE) 4 % GEL Externally apply topically daily And Three times a day PRN       methylPREDNISolone (MEDROL DOSEPAK) 4 MG tablet Take 4 mg by mouth daily follow package directions       nystatin (MYCOSTATIN) 428946 UNIT/GM POWD Apply topically 2 times daily as needed        Ondansetron (ZOFRAN ODT PO) Take 4 mg by mouth every 8 hours as needed for nausea       pantoprazole (PROTONIX) 40 MG EC tablet TAKE 1 TAB BY MOUTH ONCE DAILY BEFORE BREAKFAST FOR GERD 30 tablet 3     polyethylene glycol (MIRALAX/GLYCOLAX) Packet Take 17 g by mouth daily        polyvinyl alcohol (LIQUIFILM TEARS) 1.4 % ophthalmic solution Place 1 drop into both eyes 2 times daily And 4 times a day PRN       senna-docusate (SENOKOT-S;PERICOLACE) 8.6-50 MG per tablet Take 1 tablet by mouth 2 times daily And once daily PRN       SIMETHICONE-80 PO Take 160 mg by mouth 4 times daily as needed for intestinal gas        SPIRONOLACTONE PO Take 25 mg by mouth daily       umeclidinium-vilanterol (ANORO ELLIPTA) 62.5-25 MCG/INH oral inhaler Inhale 1 puff into the lungs daily       Zinc 30 MG CAPS Take 30 mg  by mouth daily       Medications reviewed:  Medications reconciled to facility chart and changes were made to reflect current medications as identified as above med list. Below are the changes that were made:   Medications stopped since last EPIC medication reconciliation:   There are no discontinued medications.    Medications started since last [facility] medication reconciliation:  No orders of the defined types were placed in this encounter.        REVIEW OF SYSTEMS:  4 point ROS including Respiratory, CV, GI and , other than that noted in the HPI,  is negative    Physical Exam:  /76  Pulse 56  Temp 97.6  F (36.4  C)  Resp 20  Wt 192 lb 6.4 oz (87.3 kg)  SpO2 93%  BMI 34.08 kg/m2  GENERAL APPEARANCE:  Alert, in no distress, oriented, cooperative  RESP:  respiratory effort and palpation of chest normal, lungs clear to auscultation , no respiratory distress  CV:  Palpation and auscultation of heart done , regular rate and rhythm, no murmur, rub, or gallop, +2 pedal pulses  ABDOMEN:  normal bowel sounds, soft, nontender, no hepatosplenomegaly or other masses, no guarding or rebound  M/S:   Gait and station abnormal generazalized weakness, mild tenderness to lumbar spine, right thigh un steady gait  SKIN:  Inspection of skin and subcutaneous tissue baseline  NEURO:   Cranial nerves 2-12 are normal tested and grossly at patient's baseline, Examination of sensation by touch is decrease BLE  PSYCH:  oriented X 3, affect and mood normal, slightly forgetful, judgement fair    Recent Labs:     CBC RESULTS:   Recent Labs   Lab Test  11/14/18   0802  11/12/18   0738  11/08/18   0737   WBC  7.6   --   6.3   RBC  3.70*   --   3.67*   HGB  9.8*  9.5*  9.7*   HCT  32.9*   --   32.5*   MCV  89   --   89   MCH  26.5   --   26.4*   MCHC  29.8*   --   29.8*   RDW  16.8*   --   17.0*   PLT  193   --   186       Last Basic Metabolic Panel:  Recent Labs   Lab Test  11/12/18   0738  11/08/18   0737   NA  139  146*   POTASSIUM   4.7  4.4   CHLORIDE  103  109   BLAIR  9.2  8.9   CO2  31  29   BUN  42*  37*   CR  1.33*  1.27*   GLC  80  80       Liver Function Studies -   Recent Labs   Lab Test  06/25/18   1020   ALBUMIN  3.5       TSH   Date Value Ref Range Status   08/14/2017 0.62 0.40 - 4.00 mU/L Final   04/18/2007 0.60 0.4 - 5.0 mU/L Final       Lab Results   Component Value Date    A1C 6.2 08/31/2017         Assessment/Plan:  (M48.062) Spinal stenosis of lumbar region with neurogenic claudication  (primary encounter diagnosis)  Comment: pain controlled on oxycodone, but this may be contributing to her lightheadedness, clamminess and not feeling well but suspect anxiety is contributing to this as well; awaiting clearance for neurosurgery  Plan: change oxycodone back to [place] as below, continue PT/OT, discontinue apap per patient's request, continue flexeril TID PRN, duloxetine 30mg daily, gabapentin, biofreeze; monitor and adjust; will check BMP and HGb to check for stability.     (R19.09) Mass of right inguinal region  Comment: stable, but she does feel it is less tender  Plan: apply compression tights as ordered by general surgery/ f/u with vascular surgery as scheduled on 12/4; continue lasix, spironolactone, daily weights, awaiting clearance for back surgery.     (R11.0) Nausea  Comment: ? If rt oxycodone, back pain, anxiety? Constipation could be contributing recently.   Plan: pain/bowel regimen as below; continue PRN zofran    (I10) Benign essential hypertension  Comment: labile recently, ? R/t pain medication, reduced oral intake  Plan: reduce lisinopril as below, hold parameters on isradipine, continue lasix, imdur and spironolactone as scheduled. Monitor and adjust    (K59.01) Slow transit constipation  Comment: likely r/t pain medication, decreased mobility and inadequate fluid intake  Plan: increase senna to BID as below, increase miralax to daily, prune juice PRN, monitor and adjust PRN    Orders:  1. discontinue Oxycodone  2.  Start Nocro 5/325 1 tab PO TID and 1 tab PO q 4 hours PRN Dx: pain  2. discontinue Tylenol  4. BMP, CBC on 11/23 Dx: HTN, Anemia  5. Decrease Lisinopril to 20 mg PO Q day Hold for SBP <110   6. Hold Isradipine for SBP <110, HR<60  7. Increase senna s to 1 tab PO BID Dx: Constipation  8. Change Miralax 17 gm PO Q day Dx: Constipation hold for lose stools      Electronically signed by  MARY Gómez CNP

## (undated) DEVICE — PANTIES MESH LG/XLG 2PK 706M2

## (undated) DEVICE — SU PDS II 5-0 RB-1 27" Z303H

## (undated) DEVICE — LINEN ORTHO PACK 5446

## (undated) DEVICE — PREP CHLORAPREP W/ORANGE TINT 10.5ML 260715

## (undated) DEVICE — SOL NACL 0.9% INJ 250ML BAG 2B1322Q

## (undated) DEVICE — Device

## (undated) DEVICE — ESU GROUND PAD UNIVERSAL W/O CORD

## (undated) DEVICE — SU PROLENE 6-0 C-1DA 30" M8706

## (undated) DEVICE — SU MONOCRYL 4-0 SH 27" UND Y415H

## (undated) DEVICE — SU SILK 3-0 FS-1 18" 684H

## (undated) DEVICE — PAD CHUX UNDERPAD 30X36" P3036C

## (undated) DEVICE — RETR RING LONE STAR 14.1X14.1CM 3307G

## (undated) DEVICE — SU MONOCRYL 3-0 SH 27" Y316H

## (undated) DEVICE — PACK VASCULAR SCV15VAFSB

## (undated) DEVICE — GLOVE PROTEXIS W/NEU-THERA 6.5  2D73TE65

## (undated) DEVICE — DECANTER BAG 2002S

## (undated) DEVICE — ESU ELEC NDL 1" COATED/INSULATED E1465

## (undated) DEVICE — SHUNT SUNDT 3X4X10CM NL850-5060

## (undated) DEVICE — SU SILK 3-0 SH CR 8X18" C013D

## (undated) DEVICE — NDL 19GA 1.5"

## (undated) DEVICE — STRAP KNEE/BODY 31143004

## (undated) DEVICE — LINEN TOWEL PACK X5 5464

## (undated) DEVICE — GOWN XLG DISP 9545

## (undated) DEVICE — TUBING VITAL VUE IRR TIP 8886828006

## (undated) DEVICE — BLADE KNIFE SURG 15 371115

## (undated) DEVICE — CATH FOLEY 18FR 5ML SILVER COAT SIL LUBRISIL 1758SI18

## (undated) DEVICE — SU PROLENE 7-0 BV-1DA 4X30" M8703

## (undated) DEVICE — SOL NACL 0.9% IRRIG 1000ML BOTTLE 2F7124

## (undated) DEVICE — SOL WATER IRRIG 3000ML BAG 2B7117

## (undated) DEVICE — DRSG STERI STRIP 1/2X4" R1547

## (undated) DEVICE — SU VICRYL 4-0 PS-2 18" UND J496H

## (undated) DEVICE — SU VICRYL 3-0 CT-1 CR 8X18" J738D

## (undated) DEVICE — RETR ELASTIC STAYS LONE STAR SHARP 5MM 8/PACK 3311-8G

## (undated) DEVICE — SYR 05ML SLIP TIP W/O NDL

## (undated) DEVICE — SOL NACL 0.9% IRRIG 1000ML BOTTLE 07138-09

## (undated) DEVICE — ESU PENCIL W/HOLSTER E2350H

## (undated) DEVICE — SU PROLENE 6-0 C-1DA 30" 8706H

## (undated) DEVICE — LINEN GOWN X4 5410

## (undated) DEVICE — SYR 03ML LL W/O NDL 309657

## (undated) DEVICE — SU SILK 3-0 TIE 24" SA74H

## (undated) DEVICE — SURGICEL HEMOSTAT 2X14" 1951

## (undated) DEVICE — SU SILK 2-0 TIE 24" SA75H

## (undated) DEVICE — NDL COUNTER 20CT 31142493

## (undated) DEVICE — SUCTION MANIFOLD DORNOCH ULTRA CART UL-CL500

## (undated) RX ORDER — LIDOCAINE HYDROCHLORIDE 20 MG/ML
JELLY TOPICAL
Status: DISPENSED
Start: 2019-05-30

## (undated) RX ORDER — METHYLPREDNISOLONE ACETATE 40 MG/ML
INJECTION, SUSPENSION INTRA-ARTICULAR; INTRALESIONAL; INTRAMUSCULAR; SOFT TISSUE
Status: DISPENSED
Start: 2019-07-30

## (undated) RX ORDER — HEPARIN SODIUM 1000 [USP'U]/ML
INJECTION, SOLUTION INTRAVENOUS; SUBCUTANEOUS
Status: DISPENSED
Start: 2017-08-31

## (undated) RX ORDER — FENTANYL CITRATE 50 UG/ML
INJECTION, SOLUTION INTRAMUSCULAR; INTRAVENOUS
Status: DISPENSED
Start: 2019-06-10

## (undated) RX ORDER — BUPIVACAINE HYDROCHLORIDE 5 MG/ML
INJECTION, SOLUTION EPIDURAL; INTRACAUDAL
Status: DISPENSED
Start: 2019-05-24

## (undated) RX ORDER — PROPOFOL 10 MG/ML
INJECTION, EMULSION INTRAVENOUS
Status: DISPENSED
Start: 2017-08-31

## (undated) RX ORDER — CLINDAMYCIN PHOSPHATE 900 MG/50ML
INJECTION, SOLUTION INTRAVENOUS
Status: DISPENSED
Start: 2017-08-31

## (undated) RX ORDER — LIDOCAINE HYDROCHLORIDE 20 MG/ML
INJECTION, SOLUTION EPIDURAL; INFILTRATION; INTRACAUDAL; PERINEURAL
Status: DISPENSED
Start: 2017-08-31

## (undated) RX ORDER — BUPIVACAINE HYDROCHLORIDE 5 MG/ML
INJECTION, SOLUTION EPIDURAL; INTRACAUDAL
Status: DISPENSED
Start: 2019-07-30

## (undated) RX ORDER — BUPIVACAINE HYDROCHLORIDE 5 MG/ML
INJECTION, SOLUTION EPIDURAL; INTRACAUDAL
Status: DISPENSED
Start: 2019-01-01

## (undated) RX ORDER — DEXAMETHASONE SODIUM PHOSPHATE 4 MG/ML
INJECTION, SOLUTION INTRA-ARTICULAR; INTRALESIONAL; INTRAMUSCULAR; INTRAVENOUS; SOFT TISSUE
Status: DISPENSED
Start: 2017-08-31

## (undated) RX ORDER — KETAMINE HCL IN 0.9 % NACL 50 MG/5 ML
SYRINGE (ML) INTRAVENOUS
Status: DISPENSED
Start: 2019-06-10

## (undated) RX ORDER — CEFAZOLIN SODIUM 2 G/100ML
INJECTION, SOLUTION INTRAVENOUS
Status: DISPENSED
Start: 2019-06-10

## (undated) RX ORDER — ASPIRIN 600 MG/1
SUPPOSITORY RECTAL
Status: DISPENSED
Start: 2017-08-31

## (undated) RX ORDER — ACETAMINOPHEN 325 MG/1
TABLET ORAL
Status: DISPENSED
Start: 2019-06-10

## (undated) RX ORDER — GLYCOPYRROLATE 0.2 MG/ML
INJECTION, SOLUTION INTRAMUSCULAR; INTRAVENOUS
Status: DISPENSED
Start: 2017-08-31

## (undated) RX ORDER — CITRIC ACID/SODIUM CITRATE 334-500MG
SOLUTION, ORAL ORAL
Status: DISPENSED
Start: 2019-06-10

## (undated) RX ORDER — TRIAMCINOLONE ACETONIDE 40 MG/ML
INJECTION, SUSPENSION INTRA-ARTICULAR; INTRAMUSCULAR
Status: DISPENSED
Start: 2019-05-24

## (undated) RX ORDER — ONDANSETRON 2 MG/ML
INJECTION INTRAMUSCULAR; INTRAVENOUS
Status: DISPENSED
Start: 2017-08-31

## (undated) RX ORDER — DEXAMETHASONE SODIUM PHOSPHATE 10 MG/ML
INJECTION, SOLUTION INTRAMUSCULAR; INTRAVENOUS
Status: DISPENSED
Start: 2019-07-30

## (undated) RX ORDER — LIDOCAINE HYDROCHLORIDE 10 MG/ML
INJECTION, SOLUTION EPIDURAL; INFILTRATION; INTRACAUDAL; PERINEURAL
Status: DISPENSED
Start: 2017-08-31

## (undated) RX ORDER — ALBUTEROL SULFATE 0.83 MG/ML
SOLUTION RESPIRATORY (INHALATION)
Status: DISPENSED
Start: 2019-06-10

## (undated) RX ORDER — TRAMADOL HYDROCHLORIDE 50 MG/1
TABLET ORAL
Status: DISPENSED
Start: 2019-06-10

## (undated) RX ORDER — TRIAMCINOLONE ACETONIDE 40 MG/ML
INJECTION, SUSPENSION INTRA-ARTICULAR; INTRAMUSCULAR
Status: DISPENSED
Start: 2019-01-01

## (undated) RX ORDER — FENTANYL CITRATE 50 UG/ML
INJECTION, SOLUTION INTRAMUSCULAR; INTRAVENOUS
Status: DISPENSED
Start: 2017-08-31